# Patient Record
Sex: MALE | Race: BLACK OR AFRICAN AMERICAN | NOT HISPANIC OR LATINO | Employment: OTHER | URBAN - METROPOLITAN AREA
[De-identification: names, ages, dates, MRNs, and addresses within clinical notes are randomized per-mention and may not be internally consistent; named-entity substitution may affect disease eponyms.]

---

## 2023-02-24 ENCOUNTER — DOCUMENTATION (OUTPATIENT)
Dept: PRIMARY CARE | Facility: CLINIC | Age: 38
End: 2023-02-24
Payer: MEDICARE

## 2023-02-24 PROBLEM — M65.332 TRIGGER MIDDLE FINGER OF LEFT HAND: Status: ACTIVE | Noted: 2023-02-24

## 2023-02-24 PROBLEM — Z01.818 PRE-TRANSPLANT EVALUATION FOR END STAGE RENAL DISEASE: Status: ACTIVE | Noted: 2023-02-24

## 2023-02-24 PROBLEM — E10.9 TYPE 1 DIABETES MELLITUS (MULTI): Status: ACTIVE | Noted: 2023-02-24

## 2023-02-24 PROBLEM — E66.3 OVERWEIGHT WITH BODY MASS INDEX (BMI) 25.0-29.9: Status: ACTIVE | Noted: 2023-02-24

## 2023-02-24 PROBLEM — R52 PAIN: Status: ACTIVE | Noted: 2023-02-24

## 2023-02-24 PROBLEM — E53.8 FOLIC ACID DEFICIENCY: Status: ACTIVE | Noted: 2023-02-24

## 2023-02-24 PROBLEM — Z99.2 ESRD (END STAGE RENAL DISEASE) ON DIALYSIS (MULTI): Status: ACTIVE | Noted: 2023-02-24

## 2023-02-24 PROBLEM — N18.6 ESRD (END STAGE RENAL DISEASE) ON DIALYSIS (MULTI): Status: ACTIVE | Noted: 2023-02-24

## 2023-02-24 PROBLEM — E78.5 HLD (HYPERLIPIDEMIA): Status: ACTIVE | Noted: 2023-02-24

## 2023-02-24 PROBLEM — Z79.899 DVT PROPHYLAXIS: Status: ACTIVE | Noted: 2023-02-24

## 2023-02-24 PROBLEM — L29.9 ITCH: Status: ACTIVE | Noted: 2023-02-24

## 2023-02-24 PROBLEM — K21.9 GERD (GASTROESOPHAGEAL REFLUX DISEASE): Status: ACTIVE | Noted: 2023-02-24

## 2023-02-24 PROBLEM — E55.9 VITAMIN D DEFICIENCY: Status: ACTIVE | Noted: 2023-02-24

## 2023-02-24 PROBLEM — E03.9 HYPOTHYROIDISM: Status: ACTIVE | Noted: 2023-02-24

## 2023-02-24 PROBLEM — R74.8 ELEVATED ALKALINE PHOSPHATASE LEVEL: Status: ACTIVE | Noted: 2023-02-24

## 2023-02-24 PROBLEM — I10 HYPERTENSION: Status: ACTIVE | Noted: 2023-02-24

## 2023-02-24 PROBLEM — I73.9 PVD (PERIPHERAL VASCULAR DISEASE) (CMS-HCC): Status: ACTIVE | Noted: 2023-02-24

## 2023-02-24 RX ORDER — INSULIN HUMAN 100 [IU]/ML
INJECTION, SUSPENSION SUBCUTANEOUS
COMMUNITY
Start: 2016-10-27 | End: 2023-11-10 | Stop reason: ENTERED-IN-ERROR

## 2023-02-24 RX ORDER — ASCORBIC ACID, THIAMINE MONONITRATE,RIBOFLAVIN, NIACINAMIDE, PYRIDOXINE HYDROCHLORIDE, FOLIC ACID, CYANOCOBALAMIN, BIOTIN, CALCIUM PANTOTHENATE, 100; 1.5; 1.7; 20; 10; 1; 6000; 150000; 5 MG/1; MG/1; MG/1; MG/1; MG/1; MG/1; UG/1; UG/1; MG/1
1 CAPSULE, LIQUID FILLED ORAL DAILY
COMMUNITY
End: 2023-11-10 | Stop reason: ENTERED-IN-ERROR

## 2023-02-24 RX ORDER — PANTOPRAZOLE SODIUM 40 MG/1
1 TABLET, DELAYED RELEASE ORAL DAILY
COMMUNITY
End: 2023-11-10 | Stop reason: ENTERED-IN-ERROR

## 2023-02-24 RX ORDER — FOLIC ACID 1 MG/1
1 TABLET ORAL DAILY
COMMUNITY
End: 2023-11-10 | Stop reason: ENTERED-IN-ERROR

## 2023-02-24 RX ORDER — CARVEDILOL 12.5 MG/1
1 TABLET ORAL 2 TIMES DAILY
COMMUNITY
End: 2023-11-10 | Stop reason: ENTERED-IN-ERROR

## 2023-02-24 RX ORDER — INSULIN GLARGINE 100 [IU]/ML
15 INJECTION, SOLUTION SUBCUTANEOUS DAILY
COMMUNITY
End: 2023-11-10 | Stop reason: ENTERED-IN-ERROR

## 2023-02-24 RX ORDER — ERGOCALCIFEROL 1.25 MG/1
1 CAPSULE ORAL
COMMUNITY
End: 2023-11-10 | Stop reason: ENTERED-IN-ERROR

## 2023-02-24 RX ORDER — LANOLIN ALCOHOL/MO/W.PET/CERES
2 CREAM (GRAM) TOPICAL DAILY
COMMUNITY
End: 2023-11-10 | Stop reason: ENTERED-IN-ERROR

## 2023-02-24 RX ORDER — SUCRALFATE 1 G/1
1 TABLET ORAL
COMMUNITY
End: 2023-11-10 | Stop reason: ENTERED-IN-ERROR

## 2023-02-24 RX ORDER — SEVELAMER HYDROCHLORIDE 800 MG/1
2 TABLET, FILM COATED ORAL 3 TIMES DAILY
COMMUNITY
End: 2023-11-10 | Stop reason: ENTERED-IN-ERROR

## 2023-02-24 RX ORDER — INSULIN LISPRO 100 [IU]/ML
15 INJECTION, SOLUTION INTRAVENOUS; SUBCUTANEOUS 3 TIMES DAILY
COMMUNITY
End: 2023-11-10 | Stop reason: ENTERED-IN-ERROR

## 2023-02-24 RX ORDER — NITROGLYCERIN 0.4 MG/1
TABLET SUBLINGUAL
COMMUNITY
End: 2023-11-10 | Stop reason: ENTERED-IN-ERROR

## 2023-02-24 RX ORDER — MIDODRINE HYDROCHLORIDE 10 MG/1
1 TABLET ORAL 3 TIMES DAILY
COMMUNITY
End: 2023-11-10 | Stop reason: ENTERED-IN-ERROR

## 2023-02-24 RX ORDER — ATORVASTATIN CALCIUM 40 MG/1
1 TABLET, FILM COATED ORAL DAILY
COMMUNITY
End: 2023-11-10 | Stop reason: ENTERED-IN-ERROR

## 2023-02-24 RX ORDER — VALSARTAN 80 MG/1
2 TABLET ORAL DAILY
COMMUNITY
End: 2023-11-10 | Stop reason: ENTERED-IN-ERROR

## 2023-02-24 RX ORDER — LEVOTHYROXINE SODIUM 125 UG/1
1 TABLET ORAL DAILY
COMMUNITY
End: 2023-11-10 | Stop reason: ENTERED-IN-ERROR

## 2023-03-23 ENCOUNTER — PATIENT OUTREACH (OUTPATIENT)
Dept: PRIMARY CARE | Facility: CLINIC | Age: 38
End: 2023-03-23
Payer: MEDICARE

## 2023-03-23 DIAGNOSIS — I10 ESSENTIAL HYPERTENSION, BENIGN: ICD-10-CM

## 2023-03-23 DIAGNOSIS — E10.9 TYPE 1 DIABETES MELLITUS WITHOUT COMPLICATION (MULTI): ICD-10-CM

## 2023-04-20 ENCOUNTER — PATIENT OUTREACH (OUTPATIENT)
Dept: PRIMARY CARE | Facility: CLINIC | Age: 38
End: 2023-04-20
Payer: MEDICARE

## 2023-04-20 DIAGNOSIS — E10.9 TYPE 1 DIABETES MELLITUS WITHOUT COMPLICATION (MULTI): ICD-10-CM

## 2023-04-20 DIAGNOSIS — I10 ESSENTIAL HYPERTENSION, BENIGN: ICD-10-CM

## 2023-04-20 NOTE — PROGRESS NOTES
LOV: 22, /71, A1C 6.9%  NOV: 23    Treatment Goals    For high blood pressure:   Treatment goals include:  [place]/continue prudent diet and regular exercise.   Blood Pressure Treatment goals include:   BP of 120/80, with no higher than 140/85 on consistent basis.   Exercise at least 3-4 days a week for at least 30 minutes  Eat a balanced diet, rich in fruits and vegetables, low in sodium and processed foods.  If you are overweight, work toward a goal BMI of less than 25, with short-term goal of 10 pound weight loss.    For diabetes:   Treatment goals include:  HgbA1C less than 7.0%  Fasting B - 130 mg/dL  Postprandial BG: less than 180 mg/dL  /80 on consistent basis, with no higher than 140/85  LDL cholesterol less than 100, and HDL cholesterol above 40.  Yearly retinal exam  Check feet periodically for sores or decreased sensation  Exercise at least 3-4 days a week for at least 30 minutes  Work toward a goal BMI of less than 25, although in the shorter term, losing even 10 pounds will help.    Think about those things which may be affecting your ability to reach those goals, and develop a plan to overcome them.    Additional resources are available upon request to help you attain goals, including dietitian.    Wendy Feldman RN

## 2023-05-12 ENCOUNTER — APPOINTMENT (OUTPATIENT)
Dept: PRIMARY CARE | Facility: CLINIC | Age: 38
End: 2023-05-12
Payer: MEDICARE

## 2023-05-15 ENCOUNTER — PATIENT OUTREACH (OUTPATIENT)
Dept: PRIMARY CARE | Facility: CLINIC | Age: 38
End: 2023-05-15
Payer: COMMERCIAL

## 2023-05-15 DIAGNOSIS — I10 ESSENTIAL HYPERTENSION, BENIGN: ICD-10-CM

## 2023-05-15 DIAGNOSIS — E10.9 TYPE 1 DIABETES MELLITUS WITHOUT COMPLICATION (MULTI): ICD-10-CM

## 2023-05-15 PROCEDURE — 99490 CHRNC CARE MGMT STAFF 1ST 20: CPT | Performed by: STUDENT IN AN ORGANIZED HEALTH CARE EDUCATION/TRAINING PROGRAM

## 2023-05-15 NOTE — PROGRESS NOTES
"Spoke w/ pt  Pt identified by name and     LOV: 22, /71, A1C 6.9%  NOV: 23     Treatment Goals     For high blood pressure:   Treatment goals include:  [place]/continue prudent diet and regular exercise.   Blood Pressure Treatment goals include:   BP of 120/80, with no higher than 140/85 on consistent basis.   Exercise at least 3-4 days a week for at least 30 minutes  Eat a balanced diet, rich in fruits and vegetables, low in sodium and processed foods.  If you are overweight, work toward a goal BMI of less than 25, with short-term goal of 10 pound weight loss.     For diabetes:   Treatment goals include:  HgbA1C less than 7.0%  Fasting B - 130 mg/dL  Postprandial BG: less than 180 mg/dL  /80 on consistent basis, with no higher than 140/85  LDL cholesterol less than 100, and HDL cholesterol above 40.  Yearly retinal exam  Check feet periodically for sores or decreased sensation  Exercise at least 3-4 days a week for at least 30 minutes  Work toward a goal BMI of less than 25, although in the shorter term, losing even 10 pounds will help.     Think about those things which may be affecting your ability to reach those goals, and develop a plan to overcome them.     Additional resources are available upon request to help you attain goals, including dietitian.    Pt states \"he is doing well, he does check his bs daily and today his FBS was 123, he is taking all of his medications as instructed and no RF are needed at this time.\"    Thanked pt for his time to speak w/ me today and informed him that I am happy to assist if he has any questions or concerns regarding his healthcare needs.    Pt verbalizes understanding and agrees w/ plan of care.     Wendy Feldman RN     "

## 2023-06-12 ENCOUNTER — PATIENT OUTREACH (OUTPATIENT)
Dept: PRIMARY CARE | Facility: CLINIC | Age: 38
End: 2023-06-12
Payer: COMMERCIAL

## 2023-06-12 DIAGNOSIS — I10 ESSENTIAL HYPERTENSION, BENIGN: ICD-10-CM

## 2023-06-12 DIAGNOSIS — E10.9 TYPE 1 DIABETES MELLITUS WITHOUT COMPLICATION (MULTI): ICD-10-CM

## 2023-06-26 ENCOUNTER — DOCUMENTATION (OUTPATIENT)
Dept: PRIMARY CARE | Facility: CLINIC | Age: 38
End: 2023-06-26
Payer: MEDICARE

## 2023-06-26 DIAGNOSIS — E10.9 TYPE 1 DIABETES MELLITUS WITHOUT COMPLICATION (MULTI): ICD-10-CM

## 2023-06-26 DIAGNOSIS — I10 ESSENTIAL HYPERTENSION, BENIGN: ICD-10-CM

## 2023-06-26 PROCEDURE — 99490 CHRNC CARE MGMT STAFF 1ST 20: CPT | Performed by: STUDENT IN AN ORGANIZED HEALTH CARE EDUCATION/TRAINING PROGRAM

## 2023-06-26 NOTE — PROGRESS NOTES
Chart review completed. This RN is covering for HILLARY Nguyen today. Patient message received that he was discharged from [facility] on 6/25/2023. Chart review completed in [facility], [facility] and  Inpatient to find where he was discharged to. Records show he was discharged to [facility] (an intermediate care facility). No TCM outreach will be performed. I will notify his usual Care Manager upon her return.

## 2023-07-12 ENCOUNTER — PATIENT OUTREACH (OUTPATIENT)
Dept: PRIMARY CARE | Facility: CLINIC | Age: 38
End: 2023-07-12
Payer: MEDICARE

## 2023-07-12 DIAGNOSIS — I10 ESSENTIAL HYPERTENSION, BENIGN: ICD-10-CM

## 2023-07-12 DIAGNOSIS — E10.9 TYPE 1 DIABETES MELLITUS WITHOUT COMPLICATION (MULTI): ICD-10-CM

## 2023-07-12 NOTE — PROGRESS NOTES
LOV: 22, /71, A1C 6.9%    Hospital Admission on: 23 Dx: Cerebral Ischemia     Treatment Goals     For high blood pressure:   Treatment goals include:  [place]/continue prudent diet and regular exercise.   Blood Pressure Treatment goals include:   BP of 120/80, with no higher than 140/85 on consistent basis.   Exercise at least 3-4 days a week for at least 30 minutes  Eat a balanced diet, rich in fruits and vegetables, low in sodium and processed foods.  If you are overweight, work toward a goal BMI of less than 25, with short-term goal of 10 pound weight loss.     For diabetes:   Treatment goals include:  HgbA1C less than 7.0%  Fasting B - 130 mg/dL  Postprandial BG: less than 180 mg/dL  /80 on consistent basis, with no higher than 140/85  LDL cholesterol less than 100, and HDL cholesterol above 40.  Yearly retinal exam  Check feet periodically for sores or decreased sensation  Exercise at least 3-4 days a week for at least 30 minutes  Work toward a goal BMI of less than 25, although in the shorter term, losing even 10 pounds will help.     Think about those things which may be affecting your ability to reach those goals, and develop a plan to overcome them.     Additional resources are available upon request to help you attain goals, including dietitian.     Wendy Feldman RN

## 2023-08-09 LAB
ERYTHROCYTE DISTRIBUTION WIDTH (RATIO) BY AUTOMATED COUNT: 18.1 % (ref 11.5–14.5)
ERYTHROCYTE MEAN CORPUSCULAR HEMOGLOBIN CONCENTRATION (G/DL) BY AUTOMATED: 32.4 G/DL (ref 32–36)
ERYTHROCYTE MEAN CORPUSCULAR VOLUME (FL) BY AUTOMATED COUNT: 93 FL (ref 80–100)
ERYTHROCYTES (10*6/UL) IN BLOOD BY AUTOMATED COUNT: 2.02 X10E12/L (ref 4.5–5.9)
HEMATOCRIT (%) IN BLOOD BY AUTOMATED COUNT: 18.8 % (ref 41–52)
HEMOGLOBIN (G/DL) IN BLOOD: 6.1 G/DL (ref 13.5–17.5)
LEUKOCYTES (10*3/UL) IN BLOOD BY AUTOMATED COUNT: 11.4 X10E9/L (ref 4.4–11.3)
NRBC (PER 100 WBCS) BY AUTOMATED COUNT: 0 /100 WBC (ref 0–0)
PLATELETS (10*3/UL) IN BLOOD AUTOMATED COUNT: 72 X10E9/L (ref 150–450)

## 2023-08-10 ENCOUNTER — PATIENT OUTREACH (OUTPATIENT)
Dept: PRIMARY CARE | Facility: CLINIC | Age: 38
End: 2023-08-10
Payer: MEDICARE

## 2023-08-10 DIAGNOSIS — E10.9 TYPE 1 DIABETES MELLITUS WITHOUT COMPLICATION (MULTI): ICD-10-CM

## 2023-08-10 DIAGNOSIS — I10 ESSENTIAL HYPERTENSION, BENIGN: ICD-10-CM

## 2023-08-10 NOTE — PROGRESS NOTES
Chart Review:    LOV: 22, /71, A1C 6.9%     Hospital Admission on: 23 Dx: Cerebral Ischemia, day #37 inpatient at [facility], POD #1 above the left knee amputation.    Will continue to follow up for CCM/TCM outreach.     Treatment Goals     For high blood pressure:   Treatment goals include:  [place]/continue prudent diet and regular exercise.   Blood Pressure Treatment goals include:   BP of 120/80, with no higher than 140/85 on consistent basis.   Exercise at least 3-4 days a week for at least 30 minutes  Eat a balanced diet, rich in fruits and vegetables, low in sodium and processed foods.  If you are overweight, work toward a goal BMI of less than 25, with short-term goal of 10 pound weight loss.     For diabetes:   Treatment goals include:  HgbA1C less than 7.0%  Fasting B - 130 mg/dL  Postprandial BG: less than 180 mg/dL  /80 on consistent basis, with no higher than 140/85  LDL cholesterol less than 100, and HDL cholesterol above 40.  Yearly retinal exam  Check feet periodically for sores or decreased sensation  Exercise at least 3-4 days a week for at least 30 minutes  Work toward a goal BMI of less than 25, although in the shorter term, losing even 10 pounds will help.     Think about those things which may be affecting your ability to reach those goals, and develop a plan to overcome them.     Additional resources are available upon request to help you attain goals, including dietitian.     Wendy Feldman RN

## 2023-08-15 ENCOUNTER — TELEPHONE (OUTPATIENT)
Dept: PRIMARY CARE | Facility: HOSPITAL | Age: 38
End: 2023-08-15

## 2023-08-18 ENCOUNTER — DOCUMENTATION (OUTPATIENT)
Dept: CARE COORDINATION | Facility: CLINIC | Age: 38
End: 2023-08-18
Payer: MEDICARE

## 2023-08-18 NOTE — PROGRESS NOTES
Referral received for care management services. Referral states to contact patient's mother Shanthi at [phone].  Will outreach to patient's mother to assess needs and provide support.

## 2023-08-22 ENCOUNTER — PATIENT OUTREACH (OUTPATIENT)
Dept: CARE COORDINATION | Facility: CLINIC | Age: 38
End: 2023-08-22
Payer: MEDICARE

## 2023-08-22 NOTE — PROGRESS NOTES
Outreach call to patient's mother Shanthi.  Left voicemail message for patient with my contact information.

## 2023-08-23 ENCOUNTER — PATIENT OUTREACH (OUTPATIENT)
Dept: CARE COORDINATION | Facility: CLINIC | Age: 38
End: 2023-08-23
Payer: MEDICARE

## 2023-08-23 NOTE — PROGRESS NOTES
PRISCILA returned voicemail received from patient's mother Shanthi. Shanthi states that patient needs assistance obtaining an electric wheelchair. She was unsure if his physician had submitted documentation for prior authorization, and suggested I call patient directly.    I was able to reach the patient who said his physician had submitted the prior authorization documentation to [facility] at least one month ago. He expressed concern that he is getting discharged from the rehab facility in the next few days, but still does not have an electric wheelchair. Due to his medical issues, he needs an electric wheelchair. He also requires that the panel to maneuver and control the wheelchair be on the left side.    I left messages for [employer] as well as [employer] to see if either organization could assist with helping patient obtain an electric wheelchair.     Patient and I will complete minimum of weekly check-ins until this issue is resolved.

## 2023-08-29 ENCOUNTER — PATIENT OUTREACH (OUTPATIENT)
Dept: CARE COORDINATION | Facility: CLINIC | Age: 38
End: 2023-08-29
Payer: MEDICARE

## 2023-08-29 NOTE — PROGRESS NOTES
Received voicemail from Anat at [facility] stating they have electric wheelchairs available for donation. She needs to know the width that the patient needs, and also if he is able to  the wheelchair from their location.     SW left a message for patient relaying these questions and requesting callback.    Patient did call back shortly after. We called [facility] together via conference call and spoke with Anat. She is sending a form over to my email to be completed by either myself or patient's provider. Once the form is complete, they can schedule pickup. Patient thinks he can get someone from his SNF to  the wheelchair

## 2023-09-05 ENCOUNTER — DOCUMENTATION (OUTPATIENT)
Dept: PRIMARY CARE | Facility: CLINIC | Age: 38
End: 2023-09-05
Payer: COMMERCIAL

## 2023-09-05 ENCOUNTER — PATIENT OUTREACH (OUTPATIENT)
Dept: CARE COORDINATION | Facility: CLINIC | Age: 38
End: 2023-09-05

## 2023-09-05 DIAGNOSIS — I10 ESSENTIAL HYPERTENSION, BENIGN: ICD-10-CM

## 2023-09-05 DIAGNOSIS — E10.9 TYPE 1 DIABETES MELLITUS WITHOUT COMPLICATION (MULTI): ICD-10-CM

## 2023-09-05 PROCEDURE — 99439 CHRNC CARE MGMT STAF EA ADDL: CPT | Performed by: STUDENT IN AN ORGANIZED HEALTH CARE EDUCATION/TRAINING PROGRAM

## 2023-09-05 PROCEDURE — 99490 CHRNC CARE MGMT STAFF 1ST 20: CPT | Performed by: STUDENT IN AN ORGANIZED HEALTH CARE EDUCATION/TRAINING PROGRAM

## 2023-09-05 NOTE — PROGRESS NOTES
Spoke w/ Mr. Hemphill  Pt identified by name and     Informed Mr. Hemphill that I received his [employer] Form for his electric wheelchair from [facility] and let him know that I am completing the form for him today and will fax to [employer].    Informed Mr. Hemphill that this form requires his current weight and I need his current weight to complete this form.    Mr. Hemphill verbalizes understanding and informed me that his current weight is 180lb today.     Thanked Mr. Hemphill for his time to speak w/ me today and informed him that I am happy to assist.    Form placed w/ Dr. D'Amico for his authorization and signature.    Spoke w/ Marito Perea from [employer]    She informed me that I should fax completed form to: Marko Marley at fax number [phone].    Verbalized understanding to Marito Perea and faxed completed form as instructed.    Landy notified.    Wendy Feldman RN

## 2023-09-08 ENCOUNTER — PATIENT OUTREACH (OUTPATIENT)
Dept: CARE COORDINATION | Facility: CLINIC | Age: 38
End: 2023-09-08
Payer: MEDICARE

## 2023-09-08 NOTE — PROGRESS NOTES
PRISCILA received email from [facility] staff Toni Marley:    David Bill,  After talking with Mr. Hemphill I'm not sure we have a chair that will be a good fit for him.  He said that he needs a left handed controlled, fully functional power chair like this one--the only one we have doesn't have batteries that work (so he would have to buy them for ~$400).  I see that he has [facility]--will they not provide a chair for him?    PRISCILA followed up with patient this afternoon to discuss the update from [facility]. Patient is understanding. He does not expect to be discharged in the upcoming days and agrees that we should reach out to [facility] regarding status of prior authorization.

## 2023-09-21 ENCOUNTER — PATIENT OUTREACH (OUTPATIENT)
Dept: CARE COORDINATION | Facility: CLINIC | Age: 38
End: 2023-09-21
Payer: MEDICARE

## 2023-09-21 NOTE — PROGRESS NOTES
Follow-up call with patient this afternoon. Patient reports he is back at [facility]. He said he was discharged there today. Patient expresses feeling comfortable returning back to [facility]. SW advised patient that if he has any concerns about his care to please reach out for help, whether it be to a trusted family member, me, his PCP. Pt expressed understanding.    Will follow-up in 2 weeks.

## 2023-09-21 NOTE — PROGRESS NOTES
Chart Review:  [facility] on 9/14/23  Dx: Altered mental status, unspecified, Hyperkalemia  DC to [facility] on 9/21/23  Wendy Feldman RN

## 2023-10-04 PROCEDURE — 80069 RENAL FUNCTION PANEL: CPT

## 2023-10-04 PROCEDURE — 85025 COMPLETE CBC W/AUTO DIFF WBC: CPT

## 2023-10-05 ENCOUNTER — PATIENT OUTREACH (OUTPATIENT)
Dept: PRIMARY CARE | Facility: CLINIC | Age: 38
End: 2023-10-05
Payer: MEDICARE

## 2023-10-05 ENCOUNTER — LAB REQUISITION (OUTPATIENT)
Dept: LAB | Facility: HOSPITAL | Age: 38
End: 2023-10-05
Payer: MEDICARE

## 2023-10-05 DIAGNOSIS — E10.9 TYPE 1 DIABETES MELLITUS WITHOUT COMPLICATION (MULTI): ICD-10-CM

## 2023-10-05 DIAGNOSIS — N18.9 CHRONIC KIDNEY DISEASE, UNSPECIFIED: ICD-10-CM

## 2023-10-05 DIAGNOSIS — I10 ESSENTIAL HYPERTENSION, BENIGN: ICD-10-CM

## 2023-10-05 LAB
ALBUMIN SERPL BCP-MCNC: 2.2 G/DL (ref 3.4–5)
ANION GAP SERPL CALC-SCNC: 13 MMOL/L (ref 10–20)
BASOPHILS # BLD AUTO: 0.08 X10*3/UL (ref 0–0.1)
BASOPHILS NFR BLD AUTO: 0.8 %
BUN SERPL-MCNC: 30 MG/DL (ref 6–23)
BURR CELLS BLD QL SMEAR: NORMAL
CALCIUM SERPL-MCNC: 8.4 MG/DL (ref 8.6–10.3)
CHLORIDE SERPL-SCNC: 99 MMOL/L (ref 98–107)
CO2 SERPL-SCNC: 28 MMOL/L (ref 21–32)
CREAT SERPL-MCNC: 4.5 MG/DL (ref 0.5–1.3)
EOSINOPHIL # BLD AUTO: 0.29 X10*3/UL (ref 0–0.7)
EOSINOPHIL NFR BLD AUTO: 3 %
ERYTHROCYTE [DISTWIDTH] IN BLOOD BY AUTOMATED COUNT: 21.6 % (ref 11.5–14.5)
GFR SERPL CREATININE-BSD FRML MDRD: 16 ML/MIN/1.73M*2
GLUCOSE SERPL-MCNC: 158 MG/DL (ref 74–99)
HCT VFR BLD AUTO: 29.5 % (ref 41–52)
HGB BLD-MCNC: 8.7 G/DL (ref 13.5–17.5)
IMM GRANULOCYTES # BLD AUTO: 0.07 X10*3/UL (ref 0–0.7)
IMM GRANULOCYTES NFR BLD AUTO: 0.7 % (ref 0–0.9)
LYMPHOCYTES # BLD AUTO: 1.23 X10*3/UL (ref 1.2–4.8)
LYMPHOCYTES NFR BLD AUTO: 12.8 %
MCH RBC QN AUTO: 28.1 PG (ref 26–34)
MCHC RBC AUTO-ENTMCNC: 29.5 G/DL (ref 32–36)
MCV RBC AUTO: 95 FL (ref 80–100)
MONOCYTES # BLD AUTO: 0.83 X10*3/UL (ref 0.1–1)
MONOCYTES NFR BLD AUTO: 8.7 %
NEUTROPHILS # BLD AUTO: 7.09 X10*3/UL (ref 1.2–7.7)
NEUTROPHILS NFR BLD AUTO: 74 %
NRBC BLD-RTO: 0 /100 WBCS (ref 0–0)
PHOSPHATE SERPL-MCNC: 5.2 MG/DL (ref 2.5–4.9)
PLATELET # BLD AUTO: 318 X10*3/UL (ref 150–450)
PMV BLD AUTO: 11.5 FL (ref 7.5–11.5)
POLYCHROMASIA BLD QL SMEAR: NORMAL
POTASSIUM SERPL-SCNC: 3.8 MMOL/L (ref 3.5–5.3)
RBC # BLD AUTO: 3.1 X10*6/UL (ref 4.5–5.9)
RBC MORPH BLD: NORMAL
SCHISTOCYTES BLD QL SMEAR: NORMAL
SODIUM SERPL-SCNC: 136 MMOL/L (ref 136–145)
WBC # BLD AUTO: 9.6 X10*3/UL (ref 4.4–11.3)

## 2023-10-05 NOTE — PROGRESS NOTES
"Spoke w/ Mr. Hemphill  Pt identified by name and     Mr. Hemphill informed me that \"he is doing well and he is currently still at [facility], he will be sure to call when he is dc for follow up visit w/ Dr. D'Amico.    Hosp Adm [facility] on 23  Dx: Altered mental status, unspecified, Hyperkalemia  DC to [facility] on 23- still at [facility] per Mr. Hemphill.    Thanked Mr. Hemphill for his time to speak w/ me today and informed him that I am happy to assist if he has any questions or concerns regarding his healthcare needs.    Mr. Hemphill verbalizes understanding.    Wendy Feldman RN     "

## 2023-10-06 ENCOUNTER — PATIENT OUTREACH (OUTPATIENT)
Dept: CARE COORDINATION | Facility: CLINIC | Age: 38
End: 2023-10-06
Payer: MEDICARE

## 2023-10-18 ENCOUNTER — PATIENT OUTREACH (OUTPATIENT)
Dept: CARE COORDINATION | Facility: CLINIC | Age: 38
End: 2023-10-18
Payer: MEDICARE

## 2023-10-18 ENCOUNTER — LAB REQUISITION (OUTPATIENT)
Dept: LAB | Facility: HOSPITAL | Age: 38
End: 2023-10-18
Payer: MEDICARE

## 2023-10-18 DIAGNOSIS — R53.1 WEAKNESS: ICD-10-CM

## 2023-10-18 LAB
ANION GAP SERPL CALC-SCNC: 15 MMOL/L (ref 10–20)
BUN SERPL-MCNC: 19 MG/DL (ref 6–23)
CALCIUM SERPL-MCNC: 8.7 MG/DL (ref 8.6–10.3)
CHLORIDE SERPL-SCNC: 98 MMOL/L (ref 98–107)
CO2 SERPL-SCNC: 28 MMOL/L (ref 21–32)
CREAT SERPL-MCNC: 2.79 MG/DL (ref 0.5–1.3)
ERYTHROCYTE [DISTWIDTH] IN BLOOD BY AUTOMATED COUNT: 20.5 % (ref 11.5–14.5)
GFR SERPL CREATININE-BSD FRML MDRD: 29 ML/MIN/1.73M*2
GLUCOSE SERPL-MCNC: 84 MG/DL (ref 74–99)
HCT VFR BLD AUTO: 32.2 % (ref 41–52)
HGB BLD-MCNC: 9.6 G/DL (ref 13.5–17.5)
MCH RBC QN AUTO: 28.5 PG (ref 26–34)
MCHC RBC AUTO-ENTMCNC: 29.8 G/DL (ref 32–36)
MCV RBC AUTO: 96 FL (ref 80–100)
NRBC BLD-RTO: 0 /100 WBCS (ref 0–0)
PLATELET # BLD AUTO: 303 X10*3/UL (ref 150–450)
PMV BLD AUTO: 10.2 FL (ref 7.5–11.5)
POTASSIUM SERPL-SCNC: 4.5 MMOL/L (ref 3.5–5.3)
RBC # BLD AUTO: 3.37 X10*6/UL (ref 4.5–5.9)
SODIUM SERPL-SCNC: 136 MMOL/L (ref 136–145)
WBC # BLD AUTO: 7.3 X10*3/UL (ref 4.4–11.3)

## 2023-10-18 PROCEDURE — 80048 BASIC METABOLIC PNL TOTAL CA: CPT

## 2023-10-18 PROCEDURE — 85027 COMPLETE CBC AUTOMATED: CPT

## 2023-10-18 NOTE — PROGRESS NOTES
Completed follow-up call with patient this afternoon. Patient did not have any updates, questions or concerns to discuss today. He is currently at [facility] and does not expect to be leaving in the near future. Patient says the only thing he still would like to resolve is securing an electric wheelchair. SW will look into the prior auth issue further to see if a chair can be obtained.    YEE Veloz   III  [facility]/Accountable Care Organization  Office Phone: [phone]  [email or web address]

## 2023-11-02 ENCOUNTER — PATIENT OUTREACH (OUTPATIENT)
Dept: CARE COORDINATION | Facility: CLINIC | Age: 38
End: 2023-11-02
Payer: MEDICARE

## 2023-11-02 NOTE — PROGRESS NOTES
Made outreach call to patient this afternoon. He says he is doing fine, but said he was unable to talk and asked if he could call me back.    YEE Veloz   III  [facility]/Accountable Care Organization  Office Phone: [phone]  [email or web address]

## 2023-11-06 ENCOUNTER — PATIENT OUTREACH (OUTPATIENT)
Dept: CARE COORDINATION | Facility: CLINIC | Age: 38
End: 2023-11-06
Payer: MEDICARE

## 2023-11-06 NOTE — PROGRESS NOTES
Outreach call to patient this afternoon. Patient states he is doing well. He denies having any concerns or needs at this time. He says everything is going well at [facility]. I asked patient if he would like for me to continue calling him periodically, or if he would prefer to call me if any needs/concerns arise. Patient says he prefers to just call me if needed.    YEE Veloz   III  [facility] Health/Accountable Care Organization  Office Phone: [phone]  [email or web address]

## 2023-11-09 ENCOUNTER — PATIENT OUTREACH (OUTPATIENT)
Dept: PRIMARY CARE | Facility: CLINIC | Age: 38
End: 2023-11-09
Payer: MEDICARE

## 2023-11-09 DIAGNOSIS — E10.9 TYPE 1 DIABETES MELLITUS WITHOUT COMPLICATION (MULTI): ICD-10-CM

## 2023-11-09 DIAGNOSIS — I10 ESSENTIAL HYPERTENSION, BENIGN: ICD-10-CM

## 2023-11-09 NOTE — PROGRESS NOTES
Hosp Adm [facility] on 9/14/23  Dx: Altered mental status, unspecified, Hyperkalemia  DC to [facility] on 9/21/23- still at [facility] per Mr. Hemphill.  Wendy Feldman RN

## 2023-11-10 ENCOUNTER — APPOINTMENT (OUTPATIENT)
Dept: RADIOLOGY | Facility: HOSPITAL | Age: 38
DRG: 314 | End: 2023-11-10
Payer: MEDICARE

## 2023-11-10 ENCOUNTER — HOSPITAL ENCOUNTER (INPATIENT)
Facility: HOSPITAL | Age: 38
LOS: 7 days | Discharge: NURSING, REHAB OR LONG-TERM CARE | DRG: 314 | End: 2023-11-17
Attending: EMERGENCY MEDICINE | Admitting: SPECIALIST
Payer: MEDICARE

## 2023-11-10 DIAGNOSIS — N18.6 ESRD (END STAGE RENAL DISEASE) ON DIALYSIS (MULTI): ICD-10-CM

## 2023-11-10 DIAGNOSIS — K21.00 GASTROESOPHAGEAL REFLUX DISEASE WITH ESOPHAGITIS WITHOUT HEMORRHAGE: ICD-10-CM

## 2023-11-10 DIAGNOSIS — R78.81 BACTEREMIA ASSOCIATED WITH INTRAVASCULAR LINE, INITIAL ENCOUNTER (CMS-HCC): ICD-10-CM

## 2023-11-10 DIAGNOSIS — T82.7XXA BACTEREMIA ASSOCIATED WITH INTRAVASCULAR LINE, INITIAL ENCOUNTER (CMS-HCC): ICD-10-CM

## 2023-11-10 DIAGNOSIS — Z99.2 ESRD (END STAGE RENAL DISEASE) ON DIALYSIS (MULTI): ICD-10-CM

## 2023-11-10 DIAGNOSIS — L03.116 CELLULITIS AND ABSCESS OF LEFT LEG: Primary | ICD-10-CM

## 2023-11-10 DIAGNOSIS — L02.416 CELLULITIS AND ABSCESS OF LEFT LEG: Primary | ICD-10-CM

## 2023-11-10 DIAGNOSIS — N18.6 ESRD (END STAGE RENAL DISEASE) (MULTI): ICD-10-CM

## 2023-11-10 DIAGNOSIS — E10.9 TYPE 1 DIABETES MELLITUS WITHOUT COMPLICATION (MULTI): ICD-10-CM

## 2023-11-10 DIAGNOSIS — I73.9 PVD (PERIPHERAL VASCULAR DISEASE) (CMS-HCC): ICD-10-CM

## 2023-11-10 LAB
ALBUMIN SERPL BCP-MCNC: 3.1 G/DL (ref 3.4–5)
ALP SERPL-CCNC: 401 U/L (ref 33–120)
ALT SERPL W P-5'-P-CCNC: 10 U/L (ref 10–52)
ANION GAP SERPL CALC-SCNC: 22 MMOL/L (ref 10–20)
AST SERPL W P-5'-P-CCNC: 10 U/L (ref 9–39)
BASOPHILS # BLD AUTO: 0.05 X10*3/UL (ref 0–0.1)
BASOPHILS NFR BLD AUTO: 0.3 %
BILIRUB SERPL-MCNC: 0.7 MG/DL (ref 0–1.2)
BUN SERPL-MCNC: 47 MG/DL (ref 6–23)
CALCIUM SERPL-MCNC: 8.8 MG/DL (ref 8.6–10.3)
CHLORIDE SERPL-SCNC: 92 MMOL/L (ref 98–107)
CO2 SERPL-SCNC: 24 MMOL/L (ref 21–32)
CREAT SERPL-MCNC: 7.92 MG/DL (ref 0.5–1.3)
EOSINOPHIL # BLD AUTO: 0.27 X10*3/UL (ref 0–0.7)
EOSINOPHIL NFR BLD AUTO: 1.4 %
ERYTHROCYTE [DISTWIDTH] IN BLOOD BY AUTOMATED COUNT: 18.6 % (ref 11.5–14.5)
GFR SERPL CREATININE-BSD FRML MDRD: 8 ML/MIN/1.73M*2
GLUCOSE BLD MANUAL STRIP-MCNC: 131 MG/DL (ref 74–99)
GLUCOSE SERPL-MCNC: 131 MG/DL (ref 74–99)
HCT VFR BLD AUTO: 32.7 % (ref 41–52)
HGB BLD-MCNC: 10.2 G/DL (ref 13.5–17.5)
IMM GRANULOCYTES # BLD AUTO: 0.15 X10*3/UL (ref 0–0.7)
IMM GRANULOCYTES NFR BLD AUTO: 0.8 % (ref 0–0.9)
LACTATE SERPL-SCNC: 1.1 MMOL/L (ref 0.4–2)
LYMPHOCYTES # BLD AUTO: 0.44 X10*3/UL (ref 1.2–4.8)
LYMPHOCYTES NFR BLD AUTO: 2.2 %
MCH RBC QN AUTO: 27.3 PG (ref 26–34)
MCHC RBC AUTO-ENTMCNC: 31.2 G/DL (ref 32–36)
MCV RBC AUTO: 88 FL (ref 80–100)
MONOCYTES # BLD AUTO: 0.77 X10*3/UL (ref 0.1–1)
MONOCYTES NFR BLD AUTO: 3.9 %
NEUTROPHILS # BLD AUTO: 18.02 X10*3/UL (ref 1.2–7.7)
NEUTROPHILS NFR BLD AUTO: 91.4 %
NRBC BLD-RTO: 0 /100 WBCS (ref 0–0)
PLATELET # BLD AUTO: 370 X10*3/UL (ref 150–450)
POTASSIUM SERPL-SCNC: 5.3 MMOL/L (ref 3.5–5.3)
PROT SERPL-MCNC: 7.1 G/DL (ref 6.4–8.2)
RBC # BLD AUTO: 3.73 X10*6/UL (ref 4.5–5.9)
SODIUM SERPL-SCNC: 133 MMOL/L (ref 136–145)
WBC # BLD AUTO: 19.7 X10*3/UL (ref 4.4–11.3)

## 2023-11-10 PROCEDURE — 73700 CT LOWER EXTREMITY W/O DYE: CPT | Mod: LT

## 2023-11-10 PROCEDURE — 82947 ASSAY GLUCOSE BLOOD QUANT: CPT

## 2023-11-10 PROCEDURE — 87077 CULTURE AEROBIC IDENTIFY: CPT | Mod: AHULAB | Performed by: EMERGENCY MEDICINE

## 2023-11-10 PROCEDURE — 85025 COMPLETE CBC W/AUTO DIFF WBC: CPT | Performed by: EMERGENCY MEDICINE

## 2023-11-10 PROCEDURE — 73700 CT LOWER EXTREMITY W/O DYE: CPT | Mod: LEFT SIDE | Performed by: RADIOLOGY

## 2023-11-10 PROCEDURE — 83605 ASSAY OF LACTIC ACID: CPT | Performed by: EMERGENCY MEDICINE

## 2023-11-10 PROCEDURE — 96365 THER/PROPH/DIAG IV INF INIT: CPT

## 2023-11-10 PROCEDURE — 99285 EMERGENCY DEPT VISIT HI MDM: CPT | Mod: 25 | Performed by: EMERGENCY MEDICINE

## 2023-11-10 PROCEDURE — 71045 X-RAY EXAM CHEST 1 VIEW: CPT | Mod: FOREIGN READ | Performed by: RADIOLOGY

## 2023-11-10 PROCEDURE — 96366 THER/PROPH/DIAG IV INF ADDON: CPT

## 2023-11-10 PROCEDURE — 1210000001 HC SEMI-PRIVATE ROOM DAILY

## 2023-11-10 PROCEDURE — 36415 COLL VENOUS BLD VENIPUNCTURE: CPT | Performed by: EMERGENCY MEDICINE

## 2023-11-10 PROCEDURE — 96375 TX/PRO/DX INJ NEW DRUG ADDON: CPT

## 2023-11-10 PROCEDURE — 96367 TX/PROPH/DG ADDL SEQ IV INF: CPT

## 2023-11-10 PROCEDURE — 80053 COMPREHEN METABOLIC PANEL: CPT | Performed by: EMERGENCY MEDICINE

## 2023-11-10 PROCEDURE — 2500000004 HC RX 250 GENERAL PHARMACY W/ HCPCS (ALT 636 FOR OP/ED): Mod: JZ | Performed by: EMERGENCY MEDICINE

## 2023-11-10 PROCEDURE — A4217 STERILE WATER/SALINE, 500 ML: HCPCS | Performed by: EMERGENCY MEDICINE

## 2023-11-10 PROCEDURE — 2500000004 HC RX 250 GENERAL PHARMACY W/ HCPCS (ALT 636 FOR OP/ED): Performed by: PHARMACIST

## 2023-11-10 PROCEDURE — 71045 X-RAY EXAM CHEST 1 VIEW: CPT | Mod: FY,FR

## 2023-11-10 RX ORDER — ACETAMINOPHEN 325 MG/1
975 TABLET ORAL ONCE
Status: COMPLETED | OUTPATIENT
Start: 2023-11-10 | End: 2023-11-10

## 2023-11-10 RX ORDER — SIMETHICONE 80 MG
80 TABLET,CHEWABLE ORAL EVERY 8 HOURS PRN
COMMUNITY

## 2023-11-10 RX ORDER — ACETAMINOPHEN 325 MG/1
650 TABLET ORAL EVERY 4 HOURS PRN
Status: DISCONTINUED | OUTPATIENT
Start: 2023-11-10 | End: 2023-11-14

## 2023-11-10 RX ORDER — ONDANSETRON HYDROCHLORIDE 2 MG/ML
4 INJECTION, SOLUTION INTRAVENOUS EVERY 8 HOURS PRN
Status: DISCONTINUED | OUTPATIENT
Start: 2023-11-10 | End: 2023-11-17 | Stop reason: HOSPADM

## 2023-11-10 RX ORDER — ONDANSETRON 4 MG/1
4 TABLET, FILM COATED ORAL EVERY 8 HOURS PRN
Status: DISCONTINUED | OUTPATIENT
Start: 2023-11-10 | End: 2023-11-17 | Stop reason: HOSPADM

## 2023-11-10 RX ORDER — TIGECYCLINE 50 MG/10ML
50 INJECTION, POWDER, LYOPHILIZED, FOR SOLUTION INTRAVENOUS EVERY 12 HOURS SCHEDULED
Status: DISCONTINUED | OUTPATIENT
Start: 2023-11-11 | End: 2023-11-12

## 2023-11-10 RX ORDER — VANCOMYCIN HYDROCHLORIDE 1 G/20ML
INJECTION, POWDER, LYOPHILIZED, FOR SOLUTION INTRAVENOUS DAILY PRN
Status: DISCONTINUED | OUTPATIENT
Start: 2023-11-10 | End: 2023-11-17 | Stop reason: HOSPADM

## 2023-11-10 RX ORDER — TALC
3 POWDER (GRAM) TOPICAL NIGHTLY PRN
Status: DISCONTINUED | OUTPATIENT
Start: 2023-11-10 | End: 2023-11-17 | Stop reason: HOSPADM

## 2023-11-10 RX ORDER — PANTOPRAZOLE SODIUM 40 MG/1
40 TABLET, DELAYED RELEASE ORAL
Status: DISCONTINUED | OUTPATIENT
Start: 2023-11-11 | End: 2023-11-17 | Stop reason: HOSPADM

## 2023-11-10 RX ORDER — ACETAMINOPHEN 160 MG/5ML
650 SOLUTION ORAL EVERY 4 HOURS PRN
Status: DISCONTINUED | OUTPATIENT
Start: 2023-11-10 | End: 2023-11-13

## 2023-11-10 RX ORDER — SIMETHICONE 80 MG
80 TABLET,CHEWABLE ORAL EVERY 8 HOURS PRN
Status: DISCONTINUED | OUTPATIENT
Start: 2023-11-10 | End: 2023-11-17 | Stop reason: HOSPADM

## 2023-11-10 RX ORDER — ACETAMINOPHEN 650 MG/1
650 SUPPOSITORY RECTAL EVERY 4 HOURS PRN
Status: DISCONTINUED | OUTPATIENT
Start: 2023-11-10 | End: 2023-11-13

## 2023-11-10 RX ADMIN — DEXTROSE MONOHYDRATE 100 MG: 50 INJECTION, SOLUTION INTRAVENOUS at 14:03

## 2023-11-10 RX ADMIN — PIPERACILLIN SODIUM AND TAZOBACTAM SODIUM 2.25 G: 2; .25 INJECTION, SOLUTION INTRAVENOUS at 12:22

## 2023-11-10 RX ADMIN — ACETAMINOPHEN 975 MG: 325 TABLET ORAL at 15:40

## 2023-11-10 RX ADMIN — WATER 1750 MG: 1 INJECTION INTRAMUSCULAR; INTRAVENOUS; SUBCUTANEOUS at 14:02

## 2023-11-10 RX ADMIN — PIPERACILLIN SODIUM AND TAZOBACTAM SODIUM 2.25 G: 2; .25 INJECTION, SOLUTION INTRAVENOUS at 21:30

## 2023-11-10 RX ADMIN — SODIUM CHLORIDE, POTASSIUM CHLORIDE, SODIUM LACTATE AND CALCIUM CHLORIDE 500 ML: 600; 310; 30; 20 INJECTION, SOLUTION INTRAVENOUS at 16:34

## 2023-11-10 NOTE — PROGRESS NOTES
"Vancomycin Dosing by Pharmacy- INITIAL    Edwar Hemphill is a 38 y.o. year old male who Pharmacy has been consulted for vancomycin dosing for cellulitis, skin and soft tissue. Based on the patient's indication and renal status this patient will be dosed based on a goal pre-HD level of 15-25.     Renal function is currently stable. On HD    Visit Vitals  /82   Pulse (!) 135   Temp 38.7 °C (101.7 °F)   Resp 25        Lab Results   Component Value Date    CREATININE 7.92 (H) 11/10/2023    CREATININE 2.79 (H) 10/18/2023    CREATININE 4.50 (H) 10/04/2023    CREATININE 4.13 (H) 09/21/2023    CREATININE 4.46 (H) 09/19/2023    CREATININE 5.78 (H) 09/18/2023    CREATININE CANCELED 09/18/2023        Patient weight is 79.4 kg    No results found for: \"CULTURE\"     No intake/output data recorded.  [unfilled]    Lab Results   Component Value Date    PATIENTTEMP 37.0 07/09/2023    PATIENTTEMP 37.0 07/08/2023    PATIENTTEMP 37.0 07/08/2023          Assessment/Plan     Patient has already been given a loading dose of 1750  mg. In the ED  Will initiate vancomycin maintenance, with each dialysis session     Follow-up level will be ordered before next HD session unless clinically indicated sooner.  Will continue to monitor renal function daily while on vancomycin and order serum creatinine at least every 48 hours if not already ordered.  Follow for continued vancomycin needs, clinical response, and signs/symptoms of toxicity.       Deep Rich, PharmD       "

## 2023-11-10 NOTE — PROGRESS NOTES
Pharmacy Medication History Review    Edwar Hemphill is a 38 y.o. male admitted for Cellulitis and abscess of left leg. Pharmacy reviewed the patient's [id number] medications and allergies for accuracy.    The list below reflectives the updated PTA list. Please review each medication in order reconciliation for additional clarification and justification.  Prior to Admission Medications   Prescriptions Last Dose Informant Patient Reported? Taking?   simethicone (Mylicon) 80 mg chewable tablet   Yes No   Sig: Chew 1 tablet (80 mg) every 8 hours if needed for flatulence.      Facility-Administered Medications: None           The list below reflectives the updated allergy list. Please review each documented allergy for additional clarification and justification.  Allergies  Reviewed by Yeni Hayes RN on 11/10/2023   No Known Allergies         Below are additional concerns with the patient's PTA list.  FACILITY: [facility]    Livier Mann CPhT

## 2023-11-10 NOTE — ED PROVIDER NOTES
HPI   Chief Complaint   Patient presents with    Rapid Heart Rate     Patient comes in from home from facility with complaints of rapid heart rate, fever, chills, and lethargy. Patient does have a wound vac on left lower extremity for amputation in August 2023. Patient is on dialysis [Monday, Wednesday, Friday] but missed his last session due to not feeling well        38-year-old man history of insulin-dependent diabetes, end-stage renal disease on hemodialysis with last dialysis session on Monday secondary to not feeling well, left AKA with recent stump infection and current wound VAC, anemia, hypothyroidism, peripheral arterial disease here with fever.  Patient reports he has not been feeling well over the past 2 days.  He denies having any specific pain but reports he is sleepy.  He is currently residing at [facility].                              No data recorded                Patient History   Past Medical History:   Diagnosis Date    Essential (primary) hypertension 05/26/2017    HTN (hypertension), benign    Personal history of other endocrine, nutritional and metabolic disease 05/26/2017    History of diabetes mellitus     Past Surgical History:   Procedure Laterality Date    CT AORTA AND BILATERAL ILIOFEMORAL RUNOFF ANGIOGRAM W AND/OR WO IV CONTRAST  2/9/2023    CT AORTA AND BILATERAL ILIOFEMORAL RUNOFF ANGIOGRAM W AND/OR WO IV CONTRAST 2/9/2023 DOCTOR OFFICE LEGACY     Family History   Problem Relation Name Age of Onset    Other (DIABETES DUE TO UNDERLYING CONDITION WITH MICROALBUMINURIA) Father      Other (DIABETES DUE TO UNDERLYING CONDITION WITH MICROALBUMINURIA [Other]) Other AUNT     Other (DIABETES DUE TO UNDERLYING CONDITION WITH MICROALBUMINURIA [Other]) Other GP      Social History     Tobacco Use    Smoking status: Not on file    Smokeless tobacco: Not on file   Substance Use Topics    Alcohol use: Not on file    Drug use: Not on file       Physical Exam   ED Triage Vitals   Temp Pulse  Resp BP   -- -- -- --      SpO2 Temp src Heart Rate Source Patient Position   -- -- -- --      BP Location FiO2 (%)     -- --       Physical Exam  Vitals and nursing note reviewed.   Constitutional:       General: He is in acute distress.      Appearance: He is ill-appearing.   HENT:      Head: Normocephalic.      Mouth/Throat:      Mouth: Mucous membranes are dry.   Eyes:      Conjunctiva/sclera: Conjunctivae normal.      Pupils: Pupils are equal, round, and reactive to light.   Cardiovascular:      Rate and Rhythm: Regular rhythm. Tachycardia present.      Pulses:           Radial pulses are 2+ on the right side and 2+ on the left side.      Heart sounds: No murmur heard.     No friction rub.   Pulmonary:      Effort: Pulmonary effort is normal. No respiratory distress.      Breath sounds: Normal breath sounds.      Comments: 90% on RA, with 2L NC oxygen saturation is 96%  Abdominal:      General: Abdomen is flat.      Palpations: Abdomen is soft.      Tenderness: There is no abdominal tenderness. There is no guarding or rebound.   Musculoskeletal:      Cervical back: Normal range of motion and neck supple.      Comments: RUE above elbow amputation.  RLE intact with heel ulcer without surrounding erythema.  Left AKA with wound vac.  LUE intact.  Dialysis catheter right groin   Skin:     General: Skin is warm and dry.      Comments: Ttp left AKA medial aspect of stump.  No palpable abscess.  Wound vac intact, draining clear fluid   Neurological:      Mental Status: He is alert and oriented to person, place, and time.   Psychiatric:         Mood and Affect: Mood normal.         ED Course & MDM   Diagnoses as of 11/10/23 1586   Cellulitis and abscess of left leg   ESRD (end stage renal disease) (CMS/[facility])       Medical Decision Making  EKG my interpretation is sinus tachycardia, heart rate 146, no ST elevations or depressions    Patient has positive SIRS without evidence of organ dysfunction.  Most likely source is  his left stump infection.  I have started broad-spectrum antibiotics including covering for Acinetobacter which she has grown in the past.  Patient's primary care physician Dr. Barnett refers to Dr. Osorio so I will admit to Dr. Osorio with Dr. Bruce on consult for nephrology.  Consult to Dr. Jauregui as well from infectious disease.    Patient's doctor at [facility] is actually Dr. Estrada so I changed the admitting physician to Dr. Estrada.        Procedure  Procedures     Eva Rios MD  11/10/23 1527       Eva Rios MD  11/10/23 1554

## 2023-11-11 ENCOUNTER — APPOINTMENT (OUTPATIENT)
Dept: DIALYSIS | Facility: HOSPITAL | Age: 38
End: 2023-11-11
Payer: MEDICARE

## 2023-11-11 LAB
ALBUMIN SERPL BCP-MCNC: 2.8 G/DL (ref 3.4–5)
ANION GAP SERPL CALC-SCNC: 30 MMOL/L (ref 10–20)
BUN SERPL-MCNC: 52 MG/DL (ref 6–23)
CALCIUM SERPL-MCNC: 8.2 MG/DL (ref 8.6–10.3)
CHLORIDE SERPL-SCNC: 91 MMOL/L (ref 98–107)
CO2 SERPL-SCNC: 17 MMOL/L (ref 21–32)
CREAT SERPL-MCNC: 8.24 MG/DL (ref 0.5–1.3)
ERYTHROCYTE [DISTWIDTH] IN BLOOD BY AUTOMATED COUNT: 18.7 % (ref 11.5–14.5)
GFR SERPL CREATININE-BSD FRML MDRD: 8 ML/MIN/1.73M*2
GLUCOSE BLD MANUAL STRIP-MCNC: 142 MG/DL (ref 74–99)
GLUCOSE BLD MANUAL STRIP-MCNC: 181 MG/DL (ref 74–99)
GLUCOSE BLD MANUAL STRIP-MCNC: 249 MG/DL (ref 74–99)
GLUCOSE BLD MANUAL STRIP-MCNC: 256 MG/DL (ref 74–99)
GLUCOSE SERPL-MCNC: 213 MG/DL (ref 74–99)
HCT VFR BLD AUTO: 32.5 % (ref 41–52)
HGB BLD-MCNC: 10.1 G/DL (ref 13.5–17.5)
MCH RBC QN AUTO: 27.8 PG (ref 26–34)
MCHC RBC AUTO-ENTMCNC: 31.1 G/DL (ref 32–36)
MCV RBC AUTO: 90 FL (ref 80–100)
NRBC BLD-RTO: 0 /100 WBCS (ref 0–0)
PHOSPHATE SERPL-MCNC: 5.6 MG/DL (ref 2.5–4.9)
PLATELET # BLD AUTO: 338 X10*3/UL (ref 150–450)
POTASSIUM SERPL-SCNC: 5.7 MMOL/L (ref 3.5–5.3)
RBC # BLD AUTO: 3.63 X10*6/UL (ref 4.5–5.9)
SARS-COV-2 RNA RESP QL NAA+PROBE: NOT DETECTED
SODIUM SERPL-SCNC: 132 MMOL/L (ref 136–145)
WBC # BLD AUTO: 17.3 X10*3/UL (ref 4.4–11.3)

## 2023-11-11 PROCEDURE — 2500000004 HC RX 250 GENERAL PHARMACY W/ HCPCS (ALT 636 FOR OP/ED): Performed by: INTERNAL MEDICINE

## 2023-11-11 PROCEDURE — 2500000002 HC RX 250 W HCPCS SELF ADMINISTERED DRUGS (ALT 637 FOR MEDICARE OP, ALT 636 FOR OP/ED): Performed by: SPECIALIST

## 2023-11-11 PROCEDURE — 8010000001 HC DIALYSIS - HEMODIALYSIS PER DAY

## 2023-11-11 PROCEDURE — 80069 RENAL FUNCTION PANEL: CPT | Performed by: NURSE PRACTITIONER

## 2023-11-11 PROCEDURE — 5A1D70Z PERFORMANCE OF URINARY FILTRATION, INTERMITTENT, LESS THAN 6 HOURS PER DAY: ICD-10-PCS | Performed by: INTERNAL MEDICINE

## 2023-11-11 PROCEDURE — 1200000002 HC GENERAL ROOM WITH TELEMETRY DAILY

## 2023-11-11 PROCEDURE — 85027 COMPLETE CBC AUTOMATED: CPT | Performed by: NURSE PRACTITIONER

## 2023-11-11 PROCEDURE — 82947 ASSAY GLUCOSE BLOOD QUANT: CPT

## 2023-11-11 PROCEDURE — 36415 COLL VENOUS BLD VENIPUNCTURE: CPT | Performed by: NURSE PRACTITIONER

## 2023-11-11 PROCEDURE — 90935 HEMODIALYSIS ONE EVALUATION: CPT | Performed by: INTERNAL MEDICINE

## 2023-11-11 PROCEDURE — 87635 SARS-COV-2 COVID-19 AMP PRB: CPT | Performed by: STUDENT IN AN ORGANIZED HEALTH CARE EDUCATION/TRAINING PROGRAM

## 2023-11-11 PROCEDURE — 36415 COLL VENOUS BLD VENIPUNCTURE: CPT | Performed by: INTERNAL MEDICINE

## 2023-11-11 PROCEDURE — 2500000004 HC RX 250 GENERAL PHARMACY W/ HCPCS (ALT 636 FOR OP/ED): Performed by: PHARMACIST

## 2023-11-11 PROCEDURE — 87040 BLOOD CULTURE FOR BACTERIA: CPT | Mod: AHULAB | Performed by: INTERNAL MEDICINE

## 2023-11-11 RX ORDER — VANCOMYCIN HYDROCHLORIDE 750 MG/150ML
750 INJECTION, SOLUTION INTRAVENOUS ONCE
Status: COMPLETED | OUTPATIENT
Start: 2023-11-11 | End: 2023-11-11

## 2023-11-11 RX ORDER — DEXTROSE MONOHYDRATE 100 MG/ML
0.3 INJECTION, SOLUTION INTRAVENOUS ONCE AS NEEDED
Status: DISCONTINUED | OUTPATIENT
Start: 2023-11-11 | End: 2023-11-17 | Stop reason: HOSPADM

## 2023-11-11 RX ORDER — INSULIN LISPRO 100 [IU]/ML
0-5 INJECTION, SOLUTION INTRAVENOUS; SUBCUTANEOUS
Status: DISCONTINUED | OUTPATIENT
Start: 2023-11-11 | End: 2023-11-12

## 2023-11-11 RX ORDER — DEXTROSE 50 % IN WATER (D50W) INTRAVENOUS SYRINGE
25
Status: DISCONTINUED | OUTPATIENT
Start: 2023-11-11 | End: 2023-11-17 | Stop reason: HOSPADM

## 2023-11-11 RX ADMIN — DEXTROSE MONOHYDRATE 50 MG: 50 INJECTION, SOLUTION INTRAVENOUS at 13:54

## 2023-11-11 RX ADMIN — INSULIN LISPRO 3 UNITS: 100 INJECTION, SOLUTION INTRAVENOUS; SUBCUTANEOUS at 12:48

## 2023-11-11 RX ADMIN — DEXTROSE MONOHYDRATE 50 MG: 50 INJECTION, SOLUTION INTRAVENOUS at 02:10

## 2023-11-11 RX ADMIN — PIPERACILLIN SODIUM AND TAZOBACTAM SODIUM 2.25 G: 2; .25 INJECTION, SOLUTION INTRAVENOUS at 04:49

## 2023-11-11 RX ADMIN — PIPERACILLIN SODIUM AND TAZOBACTAM SODIUM 2.25 G: 2; .25 INJECTION, SOLUTION INTRAVENOUS at 12:48

## 2023-11-11 RX ADMIN — PIPERACILLIN SODIUM AND TAZOBACTAM SODIUM 2.25 G: 2; .25 INJECTION, SOLUTION INTRAVENOUS at 20:14

## 2023-11-11 RX ADMIN — VANCOMYCIN HYDROCHLORIDE 750 MG: 750 INJECTION, SOLUTION INTRAVENOUS at 18:58

## 2023-11-11 SDOH — SOCIAL STABILITY: SOCIAL INSECURITY: DOES ANYONE TRY TO KEEP YOU FROM HAVING/CONTACTING OTHER FRIENDS OR DOING THINGS OUTSIDE YOUR HOME?: UNABLE TO ASSESS

## 2023-11-11 SDOH — SOCIAL STABILITY: SOCIAL INSECURITY: DO YOU FEEL ANYONE HAS EXPLOITED OR TAKEN ADVANTAGE OF YOU FINANCIALLY OR OF YOUR PERSONAL PROPERTY?: UNABLE TO ASSESS

## 2023-11-11 SDOH — SOCIAL STABILITY: SOCIAL INSECURITY: WERE YOU ABLE TO COMPLETE ALL THE BEHAVIORAL HEALTH SCREENINGS?: NO

## 2023-11-11 SDOH — SOCIAL STABILITY: SOCIAL INSECURITY: ARE YOU OR HAVE YOU BEEN THREATENED OR ABUSED PHYSICALLY, EMOTIONALLY, OR SEXUALLY BY ANYONE?: UNABLE TO ASSESS

## 2023-11-11 SDOH — SOCIAL STABILITY: SOCIAL INSECURITY: DO YOU FEEL UNSAFE GOING BACK TO THE PLACE WHERE YOU ARE LIVING?: UNABLE TO ASSESS

## 2023-11-11 SDOH — SOCIAL STABILITY: SOCIAL INSECURITY: HAS ANYONE EVER THREATENED TO HURT YOUR FAMILY OR YOUR PETS?: UNABLE TO ASSESS

## 2023-11-11 SDOH — SOCIAL STABILITY: SOCIAL INSECURITY: ARE THERE ANY APPARENT SIGNS OF INJURIES/BEHAVIORS THAT COULD BE RELATED TO ABUSE/NEGLECT?: UNABLE TO ASSESS

## 2023-11-11 ASSESSMENT — COGNITIVE AND FUNCTIONAL STATUS - GENERAL
TURNING FROM BACK TO SIDE WHILE IN FLAT BAD: A LITTLE
DAILY ACTIVITIY SCORE: 10
PERSONAL GROOMING: TOTAL
PATIENT BASELINE BEDBOUND: YES
HELP NEEDED FOR BATHING: TOTAL
MOVING FROM LYING ON BACK TO SITTING ON SIDE OF FLAT BED WITH BEDRAILS: A LITTLE
CLIMB 3 TO 5 STEPS WITH RAILING: TOTAL
WALKING IN HOSPITAL ROOM: TOTAL
DRESSING REGULAR LOWER BODY CLOTHING: TOTAL
MOVING TO AND FROM BED TO CHAIR: A LOT
STANDING UP FROM CHAIR USING ARMS: TOTAL
TOILETING: TOTAL
DRESSING REGULAR UPPER BODY CLOTHING: A LOT
MOBILITY SCORE: 11

## 2023-11-11 ASSESSMENT — ACTIVITIES OF DAILY LIVING (ADL)
BATHING: DEPENDENT
HEARING - RIGHT EAR: FUNCTIONAL
JUDGMENT_ADEQUATE_SAFELY_COMPLETE_DAILY_ACTIVITIES: NO
DRESSING YOURSELF: DEPENDENT
ADEQUATE_TO_COMPLETE_ADL: YES
DRESSING YOURSELF: DEPENDENT
WALKS IN HOME: DEPENDENT
PATIENT'S MEMORY ADEQUATE TO SAFELY COMPLETE DAILY ACTIVITIES?: NO
HEARING - LEFT EAR: FUNCTIONAL
PATIENT'S MEMORY ADEQUATE TO SAFELY COMPLETE DAILY ACTIVITIES?: NO
DRESSING YOURSELF: DEPENDENT
FEEDING YOURSELF: NEEDS ASSISTANCE
ADEQUATE_TO_COMPLETE_ADL: YES
FEEDING YOURSELF: DEPENDENT
JUDGMENT_ADEQUATE_SAFELY_COMPLETE_DAILY_ACTIVITIES: NO
WALKS IN HOME: DEPENDENT
TOILETING: DEPENDENT
BATHING: DEPENDENT
FEEDING YOURSELF: NEEDS ASSISTANCE
WALKS IN HOME: DEPENDENT
TOILETING: DEPENDENT
PATIENT'S MEMORY ADEQUATE TO SAFELY COMPLETE DAILY ACTIVITIES?: NO
GROOMING: DEPENDENT
BATHING: DEPENDENT
HEARING - RIGHT EAR: FUNCTIONAL
TOILETING: DEPENDENT
HEARING - LEFT EAR: FUNCTIONAL
GROOMING: NEEDS ASSISTANCE
GROOMING: DEPENDENT
HEARING - RIGHT EAR: FUNCTIONAL
ADEQUATE_TO_COMPLETE_ADL: YES
HEARING - LEFT EAR: FUNCTIONAL
JUDGMENT_ADEQUATE_SAFELY_COMPLETE_DAILY_ACTIVITIES: NO

## 2023-11-11 ASSESSMENT — PAIN SCALES - GENERAL
PAINLEVEL_OUTOF10: 0 - NO PAIN
PAINLEVEL_OUTOF10: 0 - NO PAIN

## 2023-11-11 ASSESSMENT — LIFESTYLE VARIABLES
AUDIT-C TOTAL SCORE: 0
HOW OFTEN DO YOU HAVE 6 OR MORE DRINKS ON ONE OCCASION: NEVER
SUBSTANCE_ABUSE_PAST_12_MONTHS: NO
SKIP TO QUESTIONS 9-10: 1
HOW OFTEN DO YOU HAVE A DRINK CONTAINING ALCOHOL: NEVER
HOW MANY STANDARD DRINKS CONTAINING ALCOHOL DO YOU HAVE ON A TYPICAL DAY: PATIENT DOES NOT DRINK
PRESCIPTION_ABUSE_PAST_12_MONTHS: NO
AUDIT-C TOTAL SCORE: 0

## 2023-11-11 ASSESSMENT — COLUMBIA-SUICIDE SEVERITY RATING SCALE - C-SSRS
2. HAVE YOU ACTUALLY HAD ANY THOUGHTS OF KILLING YOURSELF?: NO
6. HAVE YOU EVER DONE ANYTHING, STARTED TO DO ANYTHING, OR PREPARED TO DO ANYTHING TO END YOUR LIFE?: NO
1. IN THE PAST MONTH, HAVE YOU WISHED YOU WERE DEAD OR WISHED YOU COULD GO TO SLEEP AND NOT WAKE UP?: NO

## 2023-11-11 ASSESSMENT — PAIN - FUNCTIONAL ASSESSMENT: PAIN_FUNCTIONAL_ASSESSMENT: 0-10

## 2023-11-11 NOTE — CONSULTS
Reason For Consult  ESRD on hemodialysis     History Of Present Illness  Edwar Hemphill is a 38 y.o. male with a past medical history of end-stage renal disease on hemodialysis via right femoral TDC who resides at [facility]. 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  He later had stump debridement on 7/27 with wound VAC placement.  He had polymicrobial infection including resistant E coli, Acenetobacter baumannii and Enterobacter fecalis. He was admitted in September with positive blood cultures growing Acenetobacter baumannii and Stenotrophomonas. He went to IR for dialysis line exchange.  He comes in now due to not feeling well. CT imaging of the left limp noted a 6.3 cm fluid collection but no evidence of soft tissue gas. He was started on antimicrobial coverage. Nephrology was consulted for renal care.      Past Medical History:   Diagnosis Date    Essential (primary) hypertension 05/26/2017    HTN (hypertension), benign    Personal history of other endocrine, nutritional and metabolic disease 05/26/2017    History of diabetes mellitus       Past Surgical History:   Procedure Laterality Date    CT AORTA AND BILATERAL ILIOFEMORAL RUNOFF ANGIOGRAM W AND/OR WO IV CONTRAST  2/9/2023    CT AORTA AND BILATERAL ILIOFEMORAL RUNOFF ANGIOGRAM W AND/OR WO IV CONTRAST 2/9/2023 DOCTOR OFFICE LEGACY             Family History  Family History   Problem Relation Name Age of Onset    Other (DIABETES DUE TO UNDERLYING CONDITION WITH MICROALBUMINURIA) Father      Other (DIABETES DUE TO UNDERLYING CONDITION WITH MICROALBUMINURIA [Other]) Other AUNT     Other (DIABETES DUE TO UNDERLYING CONDITION WITH  "MICROALBUMINURIA [Other]) Other GP         Allergies  Patient has no known allergies.    Review of Systems   10 point review of systems obtained and negative unless stated in HPI:     Physical Exam   Constitutional: Well developed, awake/alert/oriented  x3   Eyes: Periorbital swelling   ENMT: mucous membranes moist   Head/Neck: Facial edema  Difficult to assess JVD   Respiratory/Thorax: Diminished bibasilar breath sounds,  no wheezing, rales or rhonchi   Cardiovascular: regular rhythm, normal  S1 and S2   Gastrointestinal: Nondistended, soft, non-tender,  no rebound tenderness or guarding   Genitourinary: No Castanon   Extremities: Pitting right leg edema  L AKA   RUE amputation  RLE HD cathter   Neurological: No asterixis   Psychological: Appropriate mood and behavior   Skin: L AKA stump  Right femoral TDC           I&O 24HR    Intake/Output Summary (Last 24 hours) at 11/10/2023 2052  Last data filed at 11/10/2023 1800  Gross per 24 hour   Intake 1100 ml   Output --   Net 1100 ml       Vitals 24HR  Heart Rate:  [106-142]   Temp:  [38 °C (100.4 °F)-39.6 °C (103.2 °F)]   Resp:  [17-25]   BP: (102-171)/()   Height:  [175.3 cm (5' 9.02\")]   Weight:  [79.4 kg (175 lb 0.7 oz)]   SpO2:  [95 %-99 %]     Scheduled Medications  [START ON 11/11/2023] pantoprazole, 40 mg, oral, Daily before breakfast  piperacillin-tazobactam, 2.25 g, intravenous, q8h  [START ON 11/11/2023] tigecycline, 50 mg, intravenous, q12h STEPHY      Continuous medications       PRN medications: acetaminophen **OR** acetaminophen **OR** acetaminophen, melatonin, ondansetron **OR** ondansetron, simethicone, vancomycin     Relevant Results  Results from last 7 days   Lab Units 11/10/23  1207   WBC AUTO x10*3/uL 19.7*   HEMOGLOBIN g/dL 10.2*   HEMATOCRIT % 32.7*   PLATELETS AUTO x10*3/uL 370   NEUTROS PCT AUTO % 91.4   LYMPHS PCT AUTO % 2.2   MONOS PCT AUTO % 3.9   EOS PCT AUTO % 1.4      Results from last 7 days   Lab Units 11/10/23  1207   SODIUM mmol/L " 133*   POTASSIUM mmol/L 5.3   CHLORIDE mmol/L 92*   CO2 mmol/L 24   BUN mg/dL 47*   CREATININE mg/dL 7.92*   CALCIUM mg/dL 8.8   PROTEIN TOTAL g/dL 7.1   BILIRUBIN TOTAL mg/dL 0.7   ALK PHOS U/L 401*   ALT U/L 10   AST U/L 10   GLUCOSE mg/dL 131*      XR chest 1 view   Final Result   Linear scarring and/or discoid atelectasis with some blunting of the   left lateral costophrenic angle remain.  On today's exam there is also   questionable very tiny right pleural effusion.  The lungs otherwise   are clear and there is no pneumothorax..   Signed by Daniel Shay MD      CT femur left wo IV contrast   Final Result   THIS STUDY WAS PERFORMED WITHOUT IV CONTRAST.  THERE IS A 6.3 X 4.6 X   2.7 CM FLUID COLLECTION WHICH EXTENDS FROM THE AMPUTATION MARGIN TO   THE UNDERLYING SKIN.  INFECTED FLUID IS NOT EXCLUDED.  WITHOUT IV   CONTRAST, I COULD NOT EXCLUDE ABSCESS.  THERE IS HOWEVER NO EVIDENCE   OF SOFT TISSUE GAS.   THERE IS BENIGN PERIOSTEAL NEW BONE FORMATION SEEN ABOUT THE RESECTION   MARGIN WHICH MIGHT SIMPLY BE RELATED TO THE AMPUTATION ITSELF.   ___    Signed by Nakul Kc MD            Assessment/Plan     Edwar Hemphill is a 38 y.o. male with a past medical history of end-stage renal disease on hemodialysis via right femoral TDC who resides at [facility]. 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  He later had stump debridement on 7/27 with wound VAC placement.  He had polymicrobial infection including resistant E coli, Acenetobacter baumannii and Enterobacter fecalis. He was admitted in September with positive blood cultures growing Acenetobacter baumannii and Stenotrophomonas. He  went to IR for dialysis line exchange.  He comes in now due to not feeling well. CT imaging of the left limp noted a 6.3 cm fluid collection but no evidence of soft tissue gas. He was started on antimicrobial coverage. Nephrology was consulted for renal care.     Mr. Hemphill reports missed dialysis since Monday due to not feeling well. He is breathing comfortably on 2 LPM NC. His electrolytes are acceptable. We will plan for iHD tomorrow. I will order erythropoietin. Will follow.     Principal Problem:    Cellulitis and abscess of left leg      I spent 45 minutes in the professional and overall care of this patient.      LIZETH Hassannpatient consult to Nephrology  Consult performed by: Eber Bruce DO  Consult ordered by: Eva Rios MD

## 2023-11-11 NOTE — H&P
HISTORY AND PHYSICAL EXAMINATION - INTERNAL MEDICINE    PATIENT NAME: Edwar Hemphill    MRN: [id number]  SERVICE DATE: 11/11/2023   SERVICE TIME:  10:15 AM    PRIMARY CARE PHYSICIAN:  Mauricio Estrada MD    ASSESSMENT & PLAN     Left stump infection--culture with gpc in pairs/chains , id consulted, on iv antibiotics    Esrd --> nephrology on consult     Htn    T2dm--> ssi and insulin     Pvd with left aka     Rt above elbow amputation     Hyponatremia on admit     Hyperkalemia/hyperphosphatemia --> treatment per renal     Anemia of chr disease    Nagma     Pain control with ecf regime      SUBJECTIVE  CHIEF COMPLAINT:   fever    HISTORY OF PRESENT ILLNESS:  Mr. Hemphill is a 38 y.o. male who presents with fever at ecf    PAST MEDICAL HISTORY:    Past Medical History:   Diagnosis Date    Essential (primary) hypertension 05/26/2017    HTN (hypertension), benign    Personal history of other endocrine, nutritional and metabolic disease 05/26/2017    History of diabetes mellitus       PAST SURGICAL HISTORY:    Past Surgical History:   Procedure Laterality Date    CT AORTA AND BILATERAL ILIOFEMORAL RUNOFF ANGIOGRAM W AND/OR WO IV CONTRAST  2/9/2023    CT AORTA AND BILATERAL ILIOFEMORAL RUNOFF ANGIOGRAM W AND/OR WO IV CONTRAST 2/9/2023 DOCTOR OFFICE LEGACY       FAMILY HISTORY:    Family History   Problem Relation Name Age of Onset    Other (DIABETES DUE TO UNDERLYING CONDITION WITH MICROALBUMINURIA) Father      Other (DIABETES DUE TO UNDERLYING CONDITION WITH MICROALBUMINURIA [Other]) Other AUNT     Other (DIABETES DUE TO UNDERLYING CONDITION WITH MICROALBUMINURIA [Other]) Other GP        SOCIAL HISTORY:  non smoker or drinker     MEDICATIONS:  Medications Prior to Admission   Medication Sig Dispense Refill Last Dose    simethicone (Mylicon) 80 mg chewable tablet Chew 1 tablet (80 mg) every 8 hours if needed for flatulence.          ALLERGIES:  No Known Allergies    COMPLETE REVIEW OF SYSTEMS:    PAIN ASSESSMENT:  + chronic pain.  GENERAL: No weight loss, malaise but +for fevers.  HEENT: Negative for frequent or significant headaches, No changes in hearing or vision, no nose bleeds or other nasal problems  NECK: Negative for lumps, goiter, pain and significant neck swelling  RESPIRATORY: Negative for cough, hemoptysis, wheezing or shortness of breath  CARDIOVASCULAR: Negative for chest pain, leg swelling or palpitations.  GI: No nausea, vomiting, or diarrhea  : No history of dysuria, frequency or incontinence.  MUSCULOSKELETAL: Negative for joint pain or swelling, back pain or muscle pain.  SKIN: Negative for lesions, rash, and itching.  PSYCH: Negative for sleep disturbance, mood disorder and recent psychosocial stressors.  HEMATOLOGY/LYMPHOLOGY: Negative for prolonged bleeding, bruising easily or swollen nodes.  ENDOCRINE: Negative for cold or heat intolerance, polyuria, polydipsia and goiter.  NEURO: No history of headaches, syncope, paralysis, seizures or tremors      OBJECTIVE  PHYSICAL EXAM:  Patient Vitals for the past 24 hrs:   BP Temp Temp src Pulse Resp SpO2 Height Weight   11/11/23 0809 152/76 37 °C (98.6 °F) Oral (!) 113 16 96 % -- --   11/11/23 0545 -- -- -- (!) 117 -- -- -- --   11/11/23 0302 140/81 37 °C (98.6 °F) -- (!) 120 18 93 % -- --   11/11/23 0215 161/90 -- -- (!) 117 18 -- -- --   11/11/23 0200 160/90 -- -- (!) 117 18 94 % -- --   11/11/23 0045 (!) 153/93 -- -- (!) 119 18 -- -- --   11/11/23 0030 (!) 161/92 -- -- (!) 120 18 98 % -- --   11/11/23 0015 (!) 151/92 37.2 °C (98.9 °F) Oral (!) 118 18 94 % -- --   11/11/23 0000 (!) 150/93 -- -- (!) 120 16 95 % -- --   11/10/23 2357 (!) 157/99 -- -- (!) 120 18 96 % -- --   11/10/23 1900 116/68 -- -- 106 19 97 % -- --   11/10/23 1830 112/68 -- -- 108 18 97 % -- --   11/10/23 1800 102/63 -- -- (!) 113 17 98 % -- --   11/10/23 1740 -- 38 °C (100.4 °F) Oral -- -- -- -- --   11/10/23 1730 107/74 -- -- (!) 123 20 98 % -- --   11/10/23 1700 124/73 -- -- (!) 130 25  "95 % -- --   11/10/23 1630 136/82 -- -- (!) 135 25 96 % -- --   11/10/23 1625 -- 38.7 °C (101.7 °F) -- -- -- -- -- --   11/10/23 1612 -- -- -- -- -- -- 1.753 m (5' 9.02\") 79.4 kg (175 lb 0.7 oz)   11/10/23 1600 (!) 171/96 -- -- (!) 134 24 -- -- --   11/10/23 1530 (!) 168/106 -- -- (!) 134 19 -- -- --   11/10/23 1505 -- 39.6 °C (103.2 °F) -- -- -- -- -- --   11/10/23 1500 (!) 167/100 -- -- (!) 135 22 99 % -- --   11/10/23 1430 (!) 168/100 -- -- (!) 133 20 99 % -- --   11/10/23 1400 (!) 165/103 -- -- (!) 134 17 98 % -- --   11/10/23 1330 (!) 150/101 -- -- (!) 139 23 95 % -- --   11/10/23 1230 165/90 -- -- (!) 139 19 98 % -- --   11/10/23 1201 160/90 39.3 °C (102.7 °F) Oral (!) 142 24 97 % -- --     Body mass index is 25.84 kg/m².  GENERAL: alert, no distress, cooperative- no visitors present at the bedside  SKIN: limited exam.  HEAD/SINUSES: No significant findings. Head atraumatic and normocephalic   EYES: PERRLA and EOMI  EARS: External ears normal  NOSE: Nares normal. Septum midline.  OROPHARYNX: Lips, mucosa, and tongue normal. Teeth and gums normal. Tongue midline. Oropharynx normal.  NECK: no jugulovenous distention, no carotid bruits, carotid pulse normal contour, supple, thyroid is normal  BACK: No CVAT.  LUNGS: sym exp, no dullness to percussion, Lungs clear to auscultation. Good diaphragmatic excursion.   CARDIAC: normal S1 and S2; no rubs, murmurs, or gallops, s3 or s4.  PMI 5th ICS 1 cm lateral-  Chest - Symmetrical chest wall expansion  Lymphatic: Neck, axilla and groin neg  ABDOMEN: Abdomen soft, non-tender. BS normal. No masses or organomegaly. No hernia, No guarding, no rigidity   EXTREMITIES: left aka, rt above elbow amp, rt leg 1+edema  NEURO: Cranial nerves II-XII intact. Sensation intact and tone appears normal- TOMAS  PULSES:  radial, brachial, femoral palpable 2+  TDC rt groin  GENITALIA/RECTAL: Deferred  MS: DIP, PIP changes of DJD- no clubbing, no effusion     DATA:   Diagnostic tests reviewed " for today's visit:    Most recent labs  Most recent imaging  t75  SIGNATURE: Mauricio Estrada MD  DATE: November 11, 2023  TIME: 10:15 AM

## 2023-11-11 NOTE — CARE PLAN
The patient's goals for the shift include  pain control    The clinical goals for the shift include pt will remain vitally stable    Over the shift, the patient did not make progress toward the following goals. Barriers to progression include refusal of care      Problem: Pain  Goal: My pain/discomfort is manageable  11/11/2023 0541 by Emerita Ellington RN  Outcome: Not Progressing  11/11/2023 0421 by Emerita Ellington RN  Outcome: Not Progressing     Problem: Safety  Goal: Patient will be injury free during hospitalization  11/11/2023 0541 by Emerita Ellington RN  Outcome: Not Progressing  11/11/2023 0421 by Emerita Ellington RN  Outcome: Not Progressing  Goal: I will remain free of falls  11/11/2023 0541 by Emerita Ellington RN  Outcome: Not Progressing  11/11/2023 0421 by Emerita Ellington RN  Outcome: Not Progressing     Problem: Daily Care  Goal: Daily care needs are met  11/11/2023 0541 by Emerita Ellington RN  Outcome: Not Progressing  11/11/2023 0421 by Emerita Ellington RN  Outcome: Not Progressing     Problem: Psychosocial Needs  Goal: Demonstrates ability to cope with hospitalization/illness  11/11/2023 0541 by Emerita Ellington RN  Outcome: Not Progressing  11/11/2023 0421 by Emerita Ellington RN  Outcome: Not Progressing  Goal: Collaborate with me, my family, and caregiver to identify my specific goals  11/11/2023 0541 by Emerita Ellington RN  Outcome: Not Progressing  11/11/2023 0421 by Emerita Ellington RN  Outcome: Not Progressing     Problem: Discharge Barriers  Goal: My discharge needs are met  11/11/2023 0541 by Emerita Ellington RN  Outcome: Not Progressing  11/11/2023 0421 by Emerita Ellington RN  Outcome: Not Progressing     Problem: Skin  Goal: Decreased wound size/increased tissue granulation at next dressing change  Outcome: Not Progressing  Goal: Participates in plan/prevention/treatment measures  Outcome: Not Progressing  Goal: Prevent/manage excess moisture  Outcome: Not Progressing  Goal: Prevent/minimize  sheer/friction injuries  Outcome: Not Progressing  Goal: Promote/optimize nutrition  Outcome: Not Progressing  Goal: Promote skin healing  Outcome: Not Progressing

## 2023-11-11 NOTE — NURSING NOTE
"This nurse attempted to do Pt admission screening and per Pt \"Leave him alone and he's not answering any questions. Nolan HIGGINS was at bedside. Pt nurse Emerita THORNTON notified  "

## 2023-11-11 NOTE — CARE PLAN
The patient's goals for the shift include      The clinical goals for the shift include      Over the shift, the patient did not make progress toward the following goals. Barriers to progression include refusal of care.     Problem: Pain  Goal: My pain/discomfort is manageable  Outcome: Not Progressing     Problem: Safety  Goal: Patient will be injury free during hospitalization  Outcome: Not Progressing  Goal: I will remain free of falls  Outcome: Not Progressing     Problem: Daily Care  Goal: Daily care needs are met  Outcome: Not Progressing     Problem: Psychosocial Needs  Goal: Demonstrates ability to cope with hospitalization/illness  Outcome: Not Progressing  Goal: Collaborate with me, my family, and caregiver to identify my specific goals  Outcome: Not Progressing     Problem: Discharge Barriers  Goal: My discharge needs are met  Outcome: Not Progressing

## 2023-11-11 NOTE — CONSULTS
Inpatient consult to Infectious Diseases  Consult performed by: Wendy Peters DO  Consult ordered by: Eva Rios MD          Referred by Dr Natalie Salmeron MD: Christopher D'Amico, DO    Reason For Consult  Hx of HRO bacteremia, +fever    History Of Present Illness  Edwar Hemphill is a 38 y.o. male presenting with fever. He has a hx of DM, ESRD on HD who presented because he was not feeling well. He has a hx of left AKA with recent stump infection. He notes fever and had documented fever on admission    Recent Sept 2023 admission for A. Baumannii and Stenotrophomonas bacteremia thought to be due to LLE stump infection. He was discharged at that time with tigecycline and levofloxacin    He was empirically started on tigecycline and pip tazo on admission    He denies any left AKA stump pain, tells me it is healing well. No sick contacts       Past Medical History  He has a past medical history of Essential (primary) hypertension (05/26/2017) and Personal history of other endocrine, nutritional and metabolic disease (05/26/2017).    Surgical History  He has a past surgical history that includes CT angio aorta and bilateral iliofemoral runoff w and or wo IV contrast (2/9/2023).     Social History  He has no history on file for tobacco use, alcohol use, and drug use.   Travel Screening       Question Response    Do you have any of the following new or worsening symptoms? None of these    In the last 10 days, have you been in contact with someone who was confirmed or suspected to have Coronavirus/COVID-19? No / Unsure    Have you had a COVID-19 viral test in the last 10 days? No    Have you traveled internationally or domestically in the last month? No          Travel History   Travel since 10/11/23    No documented travel since 10/11/23           Family History  Family History   Problem Relation Name Age of Onset    Other (DIABETES DUE TO UNDERLYING CONDITION WITH MICROALBUMINURIA) Father      Other  "(DIABETES DUE TO UNDERLYING CONDITION WITH MICROALBUMINURIA [Other]) Other AUNT     Other (DIABETES DUE TO UNDERLYING CONDITION WITH MICROALBUMINURIA [Other]) Other GP      Allergies  Patient has no known allergies.     Immunization History   Administered Date(s) Administered    Flu vaccine (IIV4), preservative free *Check age/dose* 09/17/2020    Pneumococcal polysaccharide vaccine, 23-valent, age 2 years and older (PNEUMOVAX 23) 10/02/2006    Tdap vaccine, age 7 year and older (BOOSTRIX) 10/23/2014     Review of Systems  +fever and chills, no cough or SOB     Physical Exam  General: AAOx3, NAD, nontoxic appearing  Eyes: PERRL, EOMI, no scleral icterus  ENT: no oral thrush or lesions  CV: RRR, +S1/S2  Resp: lungs CTA b/l, normal resp effort  Abd: soft, nontender, nondistended, +bowel sounds  : no dodd  Ext: s/p left AKA with wound vac  Skin: right groin TDC has a dirty dressing     Range of Vitals (last 24 hours)  Heart Rate:  [106-142]   Temp:  [37 °C (98.6 °F)-39.6 °C (103.2 °F)]   Resp:  [16-25]   BP: (102-171)/()   Height:  [175.3 cm (5' 9.02\")]   Weight:  [79.4 kg (175 lb 0.7 oz)]   SpO2:  [93 %-99 %]     Relevant Results  Lab Results   Component Value Date    WBC 19.7 (H) 11/10/2023    HGB 10.2 (L) 11/10/2023    HCT 32.7 (L) 11/10/2023    MCV 88 11/10/2023     11/10/2023      Results from last 72 hours   Lab Units 11/10/23  1207   SODIUM mmol/L 133*   POTASSIUM mmol/L 5.3   CHLORIDE mmol/L 92*   CO2 mmol/L 24   BUN mg/dL 47*   CREATININE mg/dL 7.92*   GLUCOSE mg/dL 131*   CALCIUM mg/dL 8.8   ANION GAP mmol/L 22*   EGFR mL/min/1.73m*2 8*     Results from last 72 hours   Lab Units 11/10/23  1207   ALK PHOS U/L 401*   BILIRUBIN TOTAL mg/dL 0.7   PROTEIN TOTAL g/dL 7.1   ALT U/L 10   AST U/L 10   ALBUMIN g/dL 3.1*     Estimated Creatinine Clearance: 12.5 mL/min (A) (by C-G formula based on SCr of 7.92 mg/dL (H)).  CRP   Date/Time Value Ref Range Status   07/24/2023 11:24 AM 8.04 (A) mg/dL Final     " "Comment:     REF VALUE  < 1.00     07/24/2023 07:03 AM CANCELED       Comment:     Result canceled by the ancillary.   07/21/2023 09:51 AM 11.83 (A) mg/dL Final     Comment:     REF VALUE  < 1.00       Sedimentation Rate   Date/Time Value Ref Range Status   07/02/2023 04:55 PM >130 (H) 0 - 15 mm/h Final   06/18/2023 02:56  (H) 0 - 15 mm/h Final   02/09/2023 12:12  (H) 0 - 15 mm/h Final     No results found for: \"HIV1X2\", \"HIVCONF\", \"[id number]\"  No results found for: \"HCVPCRQUANT\"    Cultures/Micro  No results found for the last 14 days.    11/10 blood cx: one with GPC in pairs and chains     Imaging:  CT LE  IMPRESSION:  THIS STUDY WAS PERFORMED WITHOUT IV CONTRAST.  THERE IS A 6.3 X 4.6 X  2.7 CM FLUID COLLECTION WHICH EXTENDS FROM THE AMPUTATION MARGIN TO  THE UNDERLYING SKIN.  INFECTED FLUID IS NOT EXCLUDED.  WITHOUT IV  CONTRAST, I COULD NOT EXCLUDE ABSCESS.  THERE IS HOWEVER NO EVIDENCE  OF SOFT TISSUE GAS.  THERE IS BENIGN PERIOSTEAL NEW BONE FORMATION SEEN ABOUT THE RESECTION  MARGIN WHICH MIGHT SIMPLY BE RELATED TO THE AMPUTATION ITSELF.    Assessment:  Fever--with now positive blood cultures GPC in pairs and chains. Suspect this is real, but awaiting organism identification  Recent bacteremia due to A. Baumannii and stenotrophomonas s/p course of tigecycline/levofloxacin  ESRD on HD    Plan/Recommendations:  -tigecycline ok for now  -agree with vancomycin as we await blood cultures  -pip tazo ok for now--gives us Pseudomonas coverage  -follow up blood cx--currently with GPC, will follow up on organism identification and repeat in the AM.  -will most likley need femoral HD line exchange/removal  -Ortho/surgery consult for possible debridement of the lower extremity fluid collection--could be the source vs HD line  -checking TTE and COVID test for completeness  -will follow    Discussed with patient's RN, Joseph Peters, DO  [facility]  #[phone]      "

## 2023-11-11 NOTE — PROCEDURES
Edwar Hemphill is a 38 y.o. male with a past medical history of end-stage renal disease on hemodialysis via right femoral TDC who resides at [facility]. 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  He later had stump debridement on 7/27 with wound VAC placement.  He had polymicrobial infection including resistant E coli, Acenetobacter baumannii and Enterobacter fecalis. He was admitted in September with positive blood cultures growing Acenetobacter baumannii and Stenotrophomonas. He went to IR for dialysis line exchange.  He comes in now due to not feeling well. CT imaging of the left limp noted a 6.3 cm fluid collection but no evidence of soft tissue gas. He was started on antimicrobial coverage.     11/11/2023  Seen on dialysis. He is tolerating conventional dialysis without issues.  He is dialyzing on a 2K bath with a target UF 2.5 kg as tolerated.  F 160 with BFR//700 mL/minute, 2.5 calcium. We will plan for dialysis again on Monday.  Blood culture is pending, ordered culture from the hemodialysis catheter.  Will await for culture results before taking the hemodialysis catheter as per my discussion with the infectious disease doctor Dr. Peters.

## 2023-11-12 LAB
ALBUMIN SERPL BCP-MCNC: 2.5 G/DL (ref 3.4–5)
ANION GAP SERPL CALC-SCNC: 24 MMOL/L (ref 10–20)
BUN SERPL-MCNC: 35 MG/DL (ref 6–23)
CALCIUM SERPL-MCNC: 8.7 MG/DL (ref 8.6–10.3)
CHLORIDE SERPL-SCNC: 91 MMOL/L (ref 98–107)
CO2 SERPL-SCNC: 20 MMOL/L (ref 21–32)
CREAT SERPL-MCNC: 5.81 MG/DL (ref 0.5–1.3)
ERYTHROCYTE [DISTWIDTH] IN BLOOD BY AUTOMATED COUNT: 18.6 % (ref 11.5–14.5)
GFR SERPL CREATININE-BSD FRML MDRD: 12 ML/MIN/1.73M*2
GLUCOSE BLD MANUAL STRIP-MCNC: 181 MG/DL (ref 74–99)
GLUCOSE BLD MANUAL STRIP-MCNC: 295 MG/DL (ref 74–99)
GLUCOSE BLD MANUAL STRIP-MCNC: 336 MG/DL (ref 74–99)
GLUCOSE BLD MANUAL STRIP-MCNC: 345 MG/DL (ref 74–99)
GLUCOSE SERPL-MCNC: 273 MG/DL (ref 74–99)
HCT VFR BLD AUTO: 29.4 % (ref 41–52)
HGB BLD-MCNC: 9.1 G/DL (ref 13.5–17.5)
MCH RBC QN AUTO: 27.2 PG (ref 26–34)
MCHC RBC AUTO-ENTMCNC: 31 G/DL (ref 32–36)
MCV RBC AUTO: 88 FL (ref 80–100)
NRBC BLD-RTO: 0 /100 WBCS (ref 0–0)
PHOSPHATE SERPL-MCNC: 5.8 MG/DL (ref 2.5–4.9)
PLATELET # BLD AUTO: 312 X10*3/UL (ref 150–450)
POTASSIUM SERPL-SCNC: 4.5 MMOL/L (ref 3.5–5.3)
RBC # BLD AUTO: 3.35 X10*6/UL (ref 4.5–5.9)
SODIUM SERPL-SCNC: 130 MMOL/L (ref 136–145)
WBC # BLD AUTO: 11.2 X10*3/UL (ref 4.4–11.3)

## 2023-11-12 PROCEDURE — 36415 COLL VENOUS BLD VENIPUNCTURE: CPT | Performed by: NURSE PRACTITIONER

## 2023-11-12 PROCEDURE — 2500000002 HC RX 250 W HCPCS SELF ADMINISTERED DRUGS (ALT 637 FOR MEDICARE OP, ALT 636 FOR OP/ED): Performed by: SPECIALIST

## 2023-11-12 PROCEDURE — 36415 COLL VENOUS BLD VENIPUNCTURE: CPT | Performed by: PHYSICIAN ASSISTANT

## 2023-11-12 PROCEDURE — 2500000004 HC RX 250 GENERAL PHARMACY W/ HCPCS (ALT 636 FOR OP/ED): Performed by: PHARMACIST

## 2023-11-12 PROCEDURE — 85027 COMPLETE CBC AUTOMATED: CPT | Performed by: NURSE PRACTITIONER

## 2023-11-12 PROCEDURE — 2500000004 HC RX 250 GENERAL PHARMACY W/ HCPCS (ALT 636 FOR OP/ED): Performed by: INTERNAL MEDICINE

## 2023-11-12 PROCEDURE — 1200000002 HC GENERAL ROOM WITH TELEMETRY DAILY

## 2023-11-12 PROCEDURE — 80069 RENAL FUNCTION PANEL: CPT | Performed by: NURSE PRACTITIONER

## 2023-11-12 PROCEDURE — 82947 ASSAY GLUCOSE BLOOD QUANT: CPT

## 2023-11-12 RX ORDER — DEXTROSE MONOHYDRATE 100 MG/ML
0.3 INJECTION, SOLUTION INTRAVENOUS ONCE AS NEEDED
Status: DISCONTINUED | OUTPATIENT
Start: 2023-11-12 | End: 2023-11-12

## 2023-11-12 RX ORDER — SEVELAMER CARBONATE 800 MG/1
2400 TABLET, FILM COATED ORAL
COMMUNITY
Start: 2022-03-25

## 2023-11-12 RX ORDER — DEXTROSE 50 % IN WATER (D50W) INTRAVENOUS SYRINGE
25
Status: DISCONTINUED | OUTPATIENT
Start: 2023-11-12 | End: 2023-11-12

## 2023-11-12 RX ORDER — INSULIN GLARGINE 100 [IU]/ML
10 INJECTION, SOLUTION SUBCUTANEOUS NIGHTLY
Status: DISCONTINUED | OUTPATIENT
Start: 2023-11-12 | End: 2023-11-17 | Stop reason: HOSPADM

## 2023-11-12 RX ORDER — INSULIN LISPRO 100 [IU]/ML
0-15 INJECTION, SOLUTION INTRAVENOUS; SUBCUTANEOUS
Status: DISCONTINUED | OUTPATIENT
Start: 2023-11-12 | End: 2023-11-17 | Stop reason: HOSPADM

## 2023-11-12 RX ORDER — SEVELAMER CARBONATE 800 MG/1
1600 TABLET, FILM COATED ORAL
Status: DISCONTINUED | OUTPATIENT
Start: 2023-11-12 | End: 2023-11-17 | Stop reason: HOSPADM

## 2023-11-12 RX ADMIN — INSULIN LISPRO 12 UNITS: 100 INJECTION, SOLUTION INTRAVENOUS; SUBCUTANEOUS at 13:27

## 2023-11-12 RX ADMIN — INSULIN LISPRO 12 UNITS: 100 INJECTION, SOLUTION INTRAVENOUS; SUBCUTANEOUS at 17:11

## 2023-11-12 RX ADMIN — PIPERACILLIN SODIUM AND TAZOBACTAM SODIUM 2.25 G: 2; .25 INJECTION, SOLUTION INTRAVENOUS at 13:27

## 2023-11-12 RX ADMIN — DEXTROSE MONOHYDRATE 50 MG: 50 INJECTION, SOLUTION INTRAVENOUS at 02:20

## 2023-11-12 RX ADMIN — INSULIN GLARGINE 10 UNITS: 100 INJECTION, SOLUTION SUBCUTANEOUS at 22:10

## 2023-11-12 RX ADMIN — PIPERACILLIN SODIUM AND TAZOBACTAM SODIUM 2.25 G: 2; .25 INJECTION, SOLUTION INTRAVENOUS at 03:52

## 2023-11-12 RX ADMIN — INSULIN LISPRO 3 UNITS: 100 INJECTION, SOLUTION INTRAVENOUS; SUBCUTANEOUS at 09:26

## 2023-11-12 RX ADMIN — PIPERACILLIN SODIUM AND TAZOBACTAM SODIUM 2.25 G: 2; .25 INJECTION, SOLUTION INTRAVENOUS at 22:04

## 2023-11-12 ASSESSMENT — COGNITIVE AND FUNCTIONAL STATUS - GENERAL
DAILY ACTIVITIY SCORE: 10
WALKING IN HOSPITAL ROOM: TOTAL
TOILETING: TOTAL
PERSONAL GROOMING: TOTAL
MOVING TO AND FROM BED TO CHAIR: A LOT
STANDING UP FROM CHAIR USING ARMS: TOTAL
TURNING FROM BACK TO SIDE WHILE IN FLAT BAD: A LITTLE
CLIMB 3 TO 5 STEPS WITH RAILING: TOTAL
MOBILITY SCORE: 11
MOVING FROM LYING ON BACK TO SITTING ON SIDE OF FLAT BED WITH BEDRAILS: A LITTLE
DRESSING REGULAR LOWER BODY CLOTHING: TOTAL
DRESSING REGULAR UPPER BODY CLOTHING: A LOT
HELP NEEDED FOR BATHING: TOTAL

## 2023-11-12 ASSESSMENT — PAIN - FUNCTIONAL ASSESSMENT: PAIN_FUNCTIONAL_ASSESSMENT: 0-10

## 2023-11-12 ASSESSMENT — PAIN SCALES - GENERAL: PAINLEVEL_OUTOF10: 0 - NO PAIN

## 2023-11-12 ASSESSMENT — ACTIVITIES OF DAILY LIVING (ADL): LACK_OF_TRANSPORTATION: PATIENT DECLINED

## 2023-11-12 NOTE — CARE PLAN
Chart reviewed. Blood cultures with GPC in clusters and GBS    Spoke with Dr Rollins yesterday and blood cultures pending through HD line (femoral)    Anticipate the need for removal/exchange of femoral line on Monday by IR    Will stop tigecycline. Continue vanco/pip tazo for now until more blood culture data available    Will order general surgery consult to review the LLE imaging for ?I&D as source is HD line vs LLE. Ordering echo also    Wendy Peters, DO  [facility]  #[phone]

## 2023-11-12 NOTE — PROGRESS NOTES
Surgery consult received on this patient.  He has a history of AKA with possible infectious complications.  This is a consult that should be redirected toward either vascular surgery or orthopedics.  General surgery generally does not get involved in stump complications in patients with amputations.    Jerman

## 2023-11-12 NOTE — PROGRESS NOTES
Edwar Hemphill is a 38 y.o. male on day 2 of admission presenting with Cellulitis and abscess of left leg.      Subjective   Pain +       Objective     Last Recorded Vitals  /87 (BP Location: Left arm, Patient Position: Lying)   Pulse 105   Temp 36.4 °C (97.6 °F) (Oral)   Resp 18   Wt 79.4 kg (175 lb 0.7 oz)   SpO2 99%   Intake/Output last 3 Shifts:    Intake/Output Summary (Last 24 hours) at 11/12/2023 1310  Last data filed at 11/11/2023 1712  Gross per 24 hour   Intake 1250 ml   Output 2500 ml   Net -1250 ml       Admission Weight  Weight: 79.4 kg (175 lb 0.7 oz) (11/10/23 1612)    Daily Weight  11/10/23 : 79.4 kg (175 lb 0.7 oz)    Image Results  XR chest 1 view  Narrative: STUDY:  Chest Radiograph;  11/10/2023 3:39 PM  INDICATION:  Chest pain with shortness of breath.  COMPARISON:  9/14/2023 XR chest.  ACCESSION NUMBER(S):  [id number]  ORDERING CLINICIAN:  JOSE ANTONIO THURMAN  TECHNIQUE:  Frontal chest was obtained at 15:30 hours.  FINDINGS:  CARDIOMEDIASTINAL SILHOUETTE:  Cardiomediastinal silhouette is normal in size and configuration.     LUNGS:  Linear scarring or discoid atelectasis remains at the left lung base  along with some blunting of the left lateral costophrenic angle.   There also appears to be some minimal blunting of the right lateral  costophrenic angle on today's exam.  There is a catheter extending  superiorly from the abdomen to the area of the lower superior vena  cava..     ABDOMEN:  No remarkable upper abdominal findings.     BONES:  No acute osseous changes.  Impression: Linear scarring and/or discoid atelectasis with some blunting of the  left lateral costophrenic angle remain.  On today's exam there is also  questionable very tiny right pleural effusion.  The lungs otherwise  are clear and there is no pneumothorax..  Signed by Daniel Shay MD  CT femur left wo IV contrast  Narrative: STUDY:  CT Extremity; Completed Time:  11/10/2023 1:27 PM.  INDICATION:  Sepsis, wound vac  left leg.  Tender stump.  Evaluate for abscess.  COMPARISON:  MR LT femur 7/19/2023.  CT left lower extremity 7/2/2023.  ACCESSION NUMBER(S):  [id number]  ORDERING CLINICIAN:  JOSE ANTONIO THURMAN  TECHNIQUE:  Thin section axial images were obtained through the left  femur without intravenous contrast.  Orthogonal reconstructed images  were obtained and reviewed.    Automated mA/kV exposure control was utilized and patient examination  was performed in strict accordance with principles of ALARA.  FINDINGS:   Bone mineralization is decreased..  There is no evidence of acute fracture or dislocation.  An amputation margin is seen involving the distal femoral diaphysis  are 0.21 cm inferior to the lesser trochanter.  There is  benign-appearing periosteal new bone formation seen about the  amputation site which might simply be related to the amputation  itself.  No definite disruption of the cortex is seen however full  cortication of the amputation margin is not present.    Dense atherosclerotic calcific changes are present.  There is congestion of the subcutaneous lymphatics fairly diffusely.  There is thickening of the skin of the lateral 5 which may be related  to edema and/or infection and/or inflammation.  There is a fluid collection seen at the amputation margin which  measures maximally 6.3 cm in transverse as measured on axial series  606 image 188, 4.6 cm in maximum height as measured on series 604,  image 60 and 2.7 cm in maximum AP diameter as measured on series 606  image 188.  Infected fluid is not excluded.  There is no evidence of  soft tissue gas.  Impression: THIS STUDY WAS PERFORMED WITHOUT IV CONTRAST.  THERE IS A 6.3 X 4.6 X  2.7 CM FLUID COLLECTION WHICH EXTENDS FROM THE AMPUTATION MARGIN TO  THE UNDERLYING SKIN.  INFECTED FLUID IS NOT EXCLUDED.  WITHOUT IV  CONTRAST, I COULD NOT EXCLUDE ABSCESS.  THERE IS HOWEVER NO EVIDENCE  OF SOFT TISSUE GAS.  THERE IS BENIGN PERIOSTEAL NEW BONE FORMATION SEEN  ABOUT THE RESECTION  MARGIN WHICH MIGHT SIMPLY BE RELATED TO THE AMPUTATION ITSELF.  ___   Signed by Nakul Kc MD      PHYSICAL EXAM  NEUROLOGICAL: No abnormal movements, no changes from before  HEENT: Head atraumatic, perrl,eomi, no oral lesions, no ear rash   NECK: Supple, no ln, jvp flat, thyroid palp  HEART: S1, S2, no added sound  LUNGS: CTAB, no crackles, no rales, no wheezing, no dullness to percussion, sym exp  EXTREMITIES: no edema  ABDOMEN: Soft, nontender, bowel sound positive, no organomegaly  SKIN: No change  EYES: No changes, perrl, eomi,   ENT: No change    JOINT: No change  Relevant Results               Assessment/Plan      Principal Problem:    Cellulitis and abscess of left leg  Esrd  Anemia chr disease  T2dm    [facility]     D/w RN  t35              Mauricio Estrada MD

## 2023-11-13 ENCOUNTER — APPOINTMENT (OUTPATIENT)
Dept: DIALYSIS | Facility: HOSPITAL | Age: 38
End: 2023-11-13
Payer: MEDICARE

## 2023-11-13 ENCOUNTER — APPOINTMENT (OUTPATIENT)
Dept: CARDIOLOGY | Facility: HOSPITAL | Age: 38
DRG: 314 | End: 2023-11-13
Payer: MEDICARE

## 2023-11-13 LAB
ALBUMIN SERPL BCP-MCNC: 2.6 G/DL (ref 3.4–5)
ANION GAP SERPL CALC-SCNC: 18 MMOL/L (ref 10–20)
BACTERIA BLD AEROBE CULT: ABNORMAL
BACTERIA BLD CULT: ABNORMAL
BUN SERPL-MCNC: 42 MG/DL (ref 6–23)
CALCIUM SERPL-MCNC: 8.3 MG/DL (ref 8.6–10.3)
CHLORIDE SERPL-SCNC: 93 MMOL/L (ref 98–107)
CO2 SERPL-SCNC: 24 MMOL/L (ref 21–32)
CREAT SERPL-MCNC: 6.76 MG/DL (ref 0.5–1.3)
ERYTHROCYTE [DISTWIDTH] IN BLOOD BY AUTOMATED COUNT: 18.4 % (ref 11.5–14.5)
GFR SERPL CREATININE-BSD FRML MDRD: 10 ML/MIN/1.73M*2
GLUCOSE BLD MANUAL STRIP-MCNC: 127 MG/DL (ref 74–99)
GLUCOSE BLD MANUAL STRIP-MCNC: 175 MG/DL (ref 74–99)
GLUCOSE BLD MANUAL STRIP-MCNC: 61 MG/DL (ref 74–99)
GLUCOSE BLD MANUAL STRIP-MCNC: 94 MG/DL (ref 74–99)
GLUCOSE SERPL-MCNC: 86 MG/DL (ref 74–99)
GRAM STN SPEC: ABNORMAL
GRAM STN SPEC: ABNORMAL
HBV SURFACE AG SERPL QL IA: NONREACTIVE
HCT VFR BLD AUTO: 28.2 % (ref 41–52)
HGB BLD-MCNC: 8.9 G/DL (ref 13.5–17.5)
MCH RBC QN AUTO: 26.7 PG (ref 26–34)
MCHC RBC AUTO-ENTMCNC: 31.6 G/DL (ref 32–36)
MCV RBC AUTO: 85 FL (ref 80–100)
NRBC BLD-RTO: 0 /100 WBCS (ref 0–0)
PHOSPHATE SERPL-MCNC: 5.3 MG/DL (ref 2.5–4.9)
PLATELET # BLD AUTO: 307 X10*3/UL (ref 150–450)
POTASSIUM SERPL-SCNC: 4.1 MMOL/L (ref 3.5–5.3)
RBC # BLD AUTO: 3.33 X10*6/UL (ref 4.5–5.9)
SODIUM SERPL-SCNC: 131 MMOL/L (ref 136–145)
VANCOMYCIN TROUGH SERPL-MCNC: 17.8 UG/ML (ref 5–20)
WBC # BLD AUTO: 9.4 X10*3/UL (ref 4.4–11.3)

## 2023-11-13 PROCEDURE — 2500000004 HC RX 250 GENERAL PHARMACY W/ HCPCS (ALT 636 FOR OP/ED): Performed by: PHYSICIAN ASSISTANT

## 2023-11-13 PROCEDURE — 82947 ASSAY GLUCOSE BLOOD QUANT: CPT

## 2023-11-13 PROCEDURE — 80069 RENAL FUNCTION PANEL: CPT | Performed by: NURSE PRACTITIONER

## 2023-11-13 PROCEDURE — 36581 REPLACE TUNNELED CV CATH: CPT | Performed by: RADIOLOGY

## 2023-11-13 PROCEDURE — 2780000003 HC OR 278 NO HCPCS

## 2023-11-13 PROCEDURE — 99222 1ST HOSP IP/OBS MODERATE 55: CPT | Performed by: NURSE PRACTITIONER

## 2023-11-13 PROCEDURE — 2500000005 HC RX 250 GENERAL PHARMACY W/O HCPCS: Performed by: SPECIALIST

## 2023-11-13 PROCEDURE — 2500000004 HC RX 250 GENERAL PHARMACY W/ HCPCS (ALT 636 FOR OP/ED): Performed by: RADIOLOGY

## 2023-11-13 PROCEDURE — 8010000001 HC DIALYSIS - HEMODIALYSIS PER DAY

## 2023-11-13 PROCEDURE — 36415 COLL VENOUS BLD VENIPUNCTURE: CPT | Performed by: INTERNAL MEDICINE

## 2023-11-13 PROCEDURE — 2500000002 HC RX 250 W HCPCS SELF ADMINISTERED DRUGS (ALT 637 FOR MEDICARE OP, ALT 636 FOR OP/ED): Performed by: PHYSICIAN ASSISTANT

## 2023-11-13 PROCEDURE — 2500000004 HC RX 250 GENERAL PHARMACY W/ HCPCS (ALT 636 FOR OP/ED): Performed by: NURSE PRACTITIONER

## 2023-11-13 PROCEDURE — 2500000005 HC RX 250 GENERAL PHARMACY W/O HCPCS: Performed by: RADIOLOGY

## 2023-11-13 PROCEDURE — 2500000004 HC RX 250 GENERAL PHARMACY W/ HCPCS (ALT 636 FOR OP/ED): Performed by: PHARMACIST

## 2023-11-13 PROCEDURE — 36415 COLL VENOUS BLD VENIPUNCTURE: CPT | Mod: AHULAB | Performed by: INTERNAL MEDICINE

## 2023-11-13 PROCEDURE — 87340 HEPATITIS B SURFACE AG IA: CPT | Mod: AHULAB | Performed by: INTERNAL MEDICINE

## 2023-11-13 PROCEDURE — 1100000001 HC PRIVATE ROOM DAILY

## 2023-11-13 PROCEDURE — 2500000001 HC RX 250 WO HCPCS SELF ADMINISTERED DRUGS (ALT 637 FOR MEDICARE OP): Performed by: PHYSICIAN ASSISTANT

## 2023-11-13 PROCEDURE — C1769 GUIDE WIRE: HCPCS

## 2023-11-13 PROCEDURE — 0J2WXYZ CHANGE OTHER DEVICE IN LOWER EXTREMITY SUBCUTANEOUS TISSUE AND FASCIA, EXTERNAL APPROACH: ICD-10-PCS | Performed by: RADIOLOGY

## 2023-11-13 PROCEDURE — 99152 MOD SED SAME PHYS/QHP 5/>YRS: CPT

## 2023-11-13 PROCEDURE — 2720000007 HC OR 272 NO HCPCS

## 2023-11-13 PROCEDURE — 80202 ASSAY OF VANCOMYCIN: CPT | Performed by: PHARMACIST

## 2023-11-13 PROCEDURE — 36581 REPLACE TUNNELED CV CATH: CPT

## 2023-11-13 PROCEDURE — 36415 COLL VENOUS BLD VENIPUNCTURE: CPT | Performed by: NURSE PRACTITIONER

## 2023-11-13 PROCEDURE — 2500000004 HC RX 250 GENERAL PHARMACY W/ HCPCS (ALT 636 FOR OP/ED): Performed by: SPECIALIST

## 2023-11-13 PROCEDURE — C1750 CATH, HEMODIALYSIS,LONG-TERM: HCPCS

## 2023-11-13 PROCEDURE — 85027 COMPLETE CBC AUTOMATED: CPT | Performed by: NURSE PRACTITIONER

## 2023-11-13 PROCEDURE — 77001 FLUOROGUIDE FOR VEIN DEVICE: CPT | Performed by: RADIOLOGY

## 2023-11-13 RX ORDER — HEPARIN SODIUM 1000 [USP'U]/ML
INJECTION, SOLUTION INTRAVENOUS; SUBCUTANEOUS AS NEEDED
Status: DISCONTINUED | OUTPATIENT
Start: 2023-11-13 | End: 2023-11-17 | Stop reason: HOSPADM

## 2023-11-13 RX ORDER — FENTANYL CITRATE 50 UG/ML
INJECTION, SOLUTION INTRAMUSCULAR; INTRAVENOUS AS NEEDED
Status: DISCONTINUED | OUTPATIENT
Start: 2023-11-13 | End: 2023-11-17 | Stop reason: HOSPADM

## 2023-11-13 RX ORDER — VANCOMYCIN HYDROCHLORIDE 1 G/200ML
1000 INJECTION, SOLUTION INTRAVENOUS ONCE
Status: COMPLETED | OUTPATIENT
Start: 2023-11-13 | End: 2023-11-13

## 2023-11-13 RX ORDER — VANCOMYCIN HYDROCHLORIDE 1 G/200ML
1000 INJECTION, SOLUTION INTRAVENOUS ONCE
Status: DISCONTINUED | OUTPATIENT
Start: 2023-11-13 | End: 2023-11-13

## 2023-11-13 RX ORDER — LIDOCAINE HYDROCHLORIDE 20 MG/ML
INJECTION, SOLUTION EPIDURAL; INFILTRATION; INTRACAUDAL; PERINEURAL AS NEEDED
Status: DISCONTINUED | OUTPATIENT
Start: 2023-11-13 | End: 2023-11-17 | Stop reason: HOSPADM

## 2023-11-13 RX ORDER — OXYCODONE HYDROCHLORIDE 5 MG/1
5 TABLET ORAL ONCE
Status: COMPLETED | OUTPATIENT
Start: 2023-11-13 | End: 2023-11-13

## 2023-11-13 RX ADMIN — VANCOMYCIN HYDROCHLORIDE 1000 MG: 1 INJECTION, SOLUTION INTRAVENOUS at 17:24

## 2023-11-13 RX ADMIN — FENTANYL CITRATE 50 MCG: 50 INJECTION INTRAMUSCULAR; INTRAVENOUS at 15:04

## 2023-11-13 RX ADMIN — SEVELAMER CARBONATE 1600 MG: 800 TABLET, FILM COATED ORAL at 17:23

## 2023-11-13 RX ADMIN — OXYCODONE HYDROCHLORIDE 5 MG: 5 TABLET ORAL at 22:41

## 2023-11-13 RX ADMIN — PIPERACILLIN SODIUM AND TAZOBACTAM SODIUM 2.25 G: 2; .25 INJECTION, SOLUTION INTRAVENOUS at 05:50

## 2023-11-13 RX ADMIN — PIPERACILLIN SODIUM AND TAZOBACTAM SODIUM 2.25 G: 2; .25 INJECTION, SOLUTION INTRAVENOUS at 12:50

## 2023-11-13 RX ADMIN — SEVELAMER CARBONATE 1600 MG: 800 TABLET, FILM COATED ORAL at 12:49

## 2023-11-13 RX ADMIN — PANTOPRAZOLE SODIUM 40 MG: 40 TABLET, DELAYED RELEASE ORAL at 05:50

## 2023-11-13 RX ADMIN — PIPERACILLIN SODIUM AND TAZOBACTAM SODIUM 2.25 G: 2; .25 INJECTION, SOLUTION INTRAVENOUS at 21:59

## 2023-11-13 RX ADMIN — INSULIN LISPRO 3 UNITS: 100 INJECTION, SOLUTION INTRAVENOUS; SUBCUTANEOUS at 17:24

## 2023-11-13 RX ADMIN — HEPARIN SODIUM 5800 UNITS: 1000 INJECTION, SOLUTION INTRAVENOUS; SUBCUTANEOUS at 15:19

## 2023-11-13 RX ADMIN — DEXTROSE MONOHYDRATE 25 G: 25 INJECTION, SOLUTION INTRAVENOUS at 08:50

## 2023-11-13 RX ADMIN — LIDOCAINE HYDROCHLORIDE 5 ML: 20 INJECTION, SOLUTION EPIDURAL; INFILTRATION; INTRACAUDAL; PERINEURAL at 15:15

## 2023-11-13 RX ADMIN — OXYCODONE HYDROCHLORIDE 5 MG: 5 TABLET ORAL at 02:04

## 2023-11-13 ASSESSMENT — COGNITIVE AND FUNCTIONAL STATUS - GENERAL
TURNING FROM BACK TO SIDE WHILE IN FLAT BAD: A LITTLE
DRESSING REGULAR UPPER BODY CLOTHING: A LITTLE
MOVING TO AND FROM BED TO CHAIR: A LITTLE
STANDING UP FROM CHAIR USING ARMS: TOTAL
CLIMB 3 TO 5 STEPS WITH RAILING: TOTAL
CLIMB 3 TO 5 STEPS WITH RAILING: TOTAL
TURNING FROM BACK TO SIDE WHILE IN FLAT BAD: A LITTLE
HELP NEEDED FOR BATHING: A LOT
WALKING IN HOSPITAL ROOM: TOTAL
MOVING FROM LYING ON BACK TO SITTING ON SIDE OF FLAT BED WITH BEDRAILS: A LITTLE
MOVING TO AND FROM BED TO CHAIR: A LITTLE
HELP NEEDED FOR BATHING: A LOT
DAILY ACTIVITIY SCORE: 15
PERSONAL GROOMING: A LITTLE
TOILETING: A LOT
MOBILITY SCORE: 13
WALKING IN HOSPITAL ROOM: TOTAL
TOILETING: A LOT
MOBILITY SCORE: 12
DRESSING REGULAR LOWER BODY CLOTHING: TOTAL
PERSONAL GROOMING: A LITTLE
DRESSING REGULAR LOWER BODY CLOTHING: A LITTLE
DRESSING REGULAR UPPER BODY CLOTHING: A LITTLE
STANDING UP FROM CHAIR USING ARMS: A LOT
DAILY ACTIVITIY SCORE: 17
MOVING FROM LYING ON BACK TO SITTING ON SIDE OF FLAT BED WITH BEDRAILS: A LITTLE

## 2023-11-13 ASSESSMENT — PAIN SCALES - GENERAL
PAINLEVEL_OUTOF10: 3
PAINLEVEL_OUTOF10: 5 - MODERATE PAIN
PAINLEVEL_OUTOF10: 7

## 2023-11-13 ASSESSMENT — PAIN - FUNCTIONAL ASSESSMENT
PAIN_FUNCTIONAL_ASSESSMENT: 0-10
PAIN_FUNCTIONAL_ASSESSMENT: 0-10

## 2023-11-13 ASSESSMENT — PAIN DESCRIPTION - LOCATION
LOCATION: LEG
LOCATION: BACK

## 2023-11-13 NOTE — PRE-PROCEDURE NOTE
Interventional Radiology Preprocedure Note    Indication for procedure: The primary encounter diagnosis was Cellulitis and abscess of left leg. Diagnoses of ESRD (end stage renal disease) (CMS/[facility]) and Bacteremia associated with intravascular line, initial encounter (CMS/[facility]) were also pertinent to this visit.    Relevant review of systems:  afebrile today    Relevant Labs:   Lab Results   Component Value Date    CREATININE 6.76 (H) 11/13/2023    EGFR 10 (L) 11/13/2023    INR 1.1 09/13/2023    PROTIME 11.9 09/13/2023       Planned Sedation/Anesthesia: Minimal    Airway assessment: normal    Directed physical examination:    A&Ox3  Breathing Unlabored  RRR  Abdomen soft, nontender    Mallampati: III (soft and hard palate and base of uvula visible)    ASA Score: ASA 4 - Patient with severe systemic disease that is a constant threat to life    Benefits, risks and alternatives of procedure and planned sedation have been discussed with the patient and/or their representative. All questions answered and they agree to proceed.

## 2023-11-13 NOTE — CARE PLAN
Problem: Safety  Goal: Patient will be injury free during hospitalization  Outcome: Progressing  Goal: I will remain free of falls  Outcome: Progressing     Problem: Pain  Goal: My pain/discomfort is manageable  Outcome: Progressing     Problem: Daily Care  Goal: Daily care needs are met  Outcome: Progressing   The patient's goals for the shift include move around in bed    The clinical goals for the shift include patient will remain free from infection    Over the shift, the patient did not make progress toward the following goals. Barriers to progression include non compliance at times . Recommendations to address these barriers include patient education.

## 2023-11-13 NOTE — POST-PROCEDURE NOTE
Interventional Radiology Brief Postprocedure Note    Attending: Ke    Assistant: None    Diagnosis: ESRD    Description of procedure: R groin tunneled HD catheter     Anesthesia:  Local  50 mcg Fentanyl    Complications: None    Estimated Blood Loss: minimal          See detailed result report with images in PACS.    The patient tolerated the procedure well without incident or complication and is in stable condition.

## 2023-11-13 NOTE — PROCEDURES
Seen on dialysis.  We are running him on a 3 potassium bath today.  Monitoring the hemoglobin, will add erythropoietin.  Monitoring the phosphorus, blood pressures will allow at least 2 L of fluid removal today.  His dialysis catheter will be exchanged.

## 2023-11-13 NOTE — CARE PLAN
Problem: Skin  Goal: Decreased wound size/increased tissue granulation at next dressing change  11/13/2023 1752 by Deann Elizabeth RN  Outcome: Progressing  11/13/2023 1708 by Deann Elizabeth RN  Outcome: Progressing  Goal: Participates in plan/prevention/treatment measures  11/13/2023 1752 by Deann Elizabeth RN  Outcome: Progressing  11/13/2023 1708 by Deann Elizabeth RN  Outcome: Progressing  Goal: Prevent/manage excess moisture  11/13/2023 1752 by Deann Elizabeth RN  Outcome: Progressing  11/13/2023 1708 by Deann Elizabeth RN  Outcome: Progressing  Goal: Prevent/minimize sheer/friction injuries  11/13/2023 1752 by Deann Elizabeth RN  Outcome: Progressing  11/13/2023 1708 by Deann Elizabeth RN  Outcome: Progressing  Goal: Promote/optimize nutrition  11/13/2023 1752 by Deann Elizabeth RN  Outcome: Progressing  11/13/2023 1708 by Deann Elizabeth RN  Outcome: Progressing  Goal: Promote skin healing  11/13/2023 1752 by Deann Elizabeth RN  Outcome: Progressing  11/13/2023 1708 by Deann Elizabeth RN  Outcome: Progressing   The patient's goals for the shift include move around in bed    The clinical goals for the shift include to be  afebrile throughout this shift

## 2023-11-13 NOTE — CONSULTS
Consults    Reason For Consult  Right fem line exchange.     History Of Present Illness  Edwar Hemphill is a 38 y.o. male presenting with LLE stump infection with abscess. CLABSI due to staph aureus and staph capitis with right Fem HD line. IR at [facility] last exchanged this line 9/21/23. He had a subsequent line removal with replacement at a [facility] facility on 10/19/23. WBC 9.4.      Past Medical History  He has a past medical history of Essential (primary) hypertension (05/26/2017) and Personal history of other endocrine, nutritional and metabolic disease (05/26/2017).    Surgical History  He has a past surgical history that includes CT angio aorta and bilateral iliofemoral runoff w and or wo IV contrast (2/9/2023).     Social History  He reports that he has been smoking cigarettes. He has a 3.75 pack-year smoking history. He has never used smokeless tobacco. No history on file for alcohol use and drug use.    Family History  Family History   Problem Relation Name Age of Onset    Other (DIABETES DUE TO UNDERLYING CONDITION WITH MICROALBUMINURIA) Father      Other (DIABETES DUE TO UNDERLYING CONDITION WITH MICROALBUMINURIA [Other]) Other AUNT     Other (DIABETES DUE TO UNDERLYING CONDITION WITH MICROALBUMINURIA [Other]) Other GP         Allergies  Patient has no known allergies.    MEDS:    Current Facility-Administered Medications:     acetaminophen (Tylenol) tablet 650 mg, 650 mg, oral, q4h PRN **OR** [DISCONTINUED] acetaminophen (Tylenol) oral liquid 650 mg, 650 mg, nasogastric tube, q4h PRN **OR** [DISCONTINUED] acetaminophen (Tylenol) suppository 650 mg, 650 mg, rectal, q4h PRN, AMARILYS Kothari-CNP    dextrose 10 % in water (D10W) infusion, 0.3 g/kg/hr, intravenous, Once PRN, Mauricio Estrada MD    dextrose 50 % injection 25 g, 25 g, intravenous, q15 min PRN, Mauricio Estrada MD, 25 g at 11/13/23 0850    epoetin tyson-epbx (Retacrit) injection 8,000 Units, 8,000 Units, subcutaneous, Once per day on Mon Wed  Fri, Zeeshan Gonzalez MD    glucagon (Glucagen) injection 1 mg, 1 mg, intramuscular, q15 min PRN, Mauricio Estrada MD    insulin glargine (Lantus) injection 10 Units, 10 Units, subcutaneous, Nightly, Mauricio Estrada MD, 10 Units at 11/12/23 2210    insulin lispro (HumaLOG) injection 0-15 Units, 0-15 Units, subcutaneous, TID with meals, Mauricio Estrada MD, 12 Units at 11/12/23 1711    melatonin tablet 3 mg, 3 mg, oral, Nightly PRN, AMARILYS Kothari-CNP    ondansetron (Zofran) tablet 4 mg, 4 mg, oral, q8h PRN **OR** ondansetron (Zofran) injection 4 mg, 4 mg, intravenous, q8h PRN, AMARILYS Kothari-CNP    pantoprazole (ProtoNix) EC tablet 40 mg, 40 mg, oral, Daily before breakfast, OSBALDO Kothari, 40 mg at 11/13/23 0550    piperacillin-tazobactam-dextrose (Zosyn) IV 2.25 g, 2.25 g, intravenous, q8h, Jaison MottaD, Last Rate: 100 mL/hr at 11/13/23 1250, 2.25 g at 11/13/23 1250    sevelamer carbonate (Renvela) tablet 1,600 mg, 1,600 mg, oral, TID with meals, Mauricio Estrada MD, 1,600 mg at 11/13/23 1249    simethicone (Mylicon) chewable tablet 80 mg, 80 mg, oral, q8h PRN, AMARILYS Kothari-CNP    vancomycin (Vancocin) placeholder, , miscellaneous, Daily PRN, Deep Rich, PharmD    vancomycin in dextrose 5 % (Vancocin) IVPB 1,000 mg, 1,000 mg, intravenous, Once, Navjot Manley, PharmD    Review of Systems  History obtained from the patient  General ROS: positive for  - chills and fever  Respiratory ROS: no cough, shortness of breath, or wheezing  Cardiovascular ROS: no chest pain or dyspnea on exertion  Gastrointestinal ROS: no abdominal pain, change in bowel habits, or black or bloody stools  Genitourinary ROS: no dysuria, trouble voiding, or hematuria  Pain to LLE stump     Last Recorded Vitals  /73   Pulse 95   Temp 36.1 °C (97 °F) (Temporal)   Resp 18   Wt 79.4 kg (175 lb 0.7 oz)   SpO2 94%      Physical Exam  Orientation: oriented to person,  "place, time, and general circumstances  HEENT: normocephalic, atraumatic  Pulm: clear to auscultation bilaterally, no wheezes, good air entry  Cardiac: Regular rate and rhythm or without murmur or extra heart sounds  GI: soft, nontender, normal bowel sounds  Pulses: Palpable to RLE.     Relevant Results    LABS:  Lab Results   Component Value Date    WBC 9.4 11/13/2023    HGB 8.9 (L) 11/13/2023    HCT 28.2 (L) 11/13/2023    MCV 85 11/13/2023     11/13/2023      Results from last 72 hours   Lab Units 11/13/23  0606   SODIUM mmol/L 131*   POTASSIUM mmol/L 4.1   CHLORIDE mmol/L 93*   CO2 mmol/L 24   BUN mg/dL 42*   CREATININE mg/dL 6.76*   GLUCOSE mg/dL 86   CALCIUM mg/dL 8.3*   ANION GAP mmol/L 18   EGFR mL/min/1.73m*2 10*     Results from last 72 hours   Lab Units 11/13/23  0606   ALBUMIN g/dL 2.6*           No lab exists for component: \"PT\"    MICRO:  Susceptibility data from last 14 days.  Collected Specimen Info Organism Ceftriaxone Clindamycin Erythromycin Levofloxacin Oxacillin Penicillin Tetracycline Trimethoprim/Sulfamethoxazole Vancomycin   11/11/23 Blood culture from Central Line/Catheter (Specify below) Methicillin Resistant Staphylococcus aureus (MRSA)            11/10/23 Blood culture from Peripheral Venipuncture Methicillin Resistant Staphylococcus aureus (MRSA)            11/10/23 Blood culture from Peripheral Venipuncture Methicillin Resistant Staphylococcus aureus (MRSA)  R R  R  R S S     Streptococcus dysgalactiae/canis S   S  S          IMAGING:   XR chest 1 view   Final Result   Linear scarring and/or discoid atelectasis with some blunting of the   left lateral costophrenic angle remain.  On today's exam there is also   questionable very tiny right pleural effusion.  The lungs otherwise   are clear and there is no pneumothorax..   Signed by Daniel Shay MD      CT femur left wo IV contrast   Final Result   THIS STUDY WAS PERFORMED WITHOUT IV CONTRAST.  THERE IS A 6.3 X 4.6 X   2.7 CM " FLUID COLLECTION WHICH EXTENDS FROM THE AMPUTATION MARGIN TO   THE UNDERLYING SKIN.  INFECTED FLUID IS NOT EXCLUDED.  WITHOUT IV   CONTRAST, I COULD NOT EXCLUDE ABSCESS.  THERE IS HOWEVER NO EVIDENCE   OF SOFT TISSUE GAS.   THERE IS BENIGN PERIOSTEAL NEW BONE FORMATION SEEN ABOUT THE RESECTION   MARGIN WHICH MIGHT SIMPLY BE RELATED TO THE AMPUTATION ITSELF.   ___    Signed by Nakul Kc MD      Transthoracic Echo (TTE) Complete    (Results Pending)   IR CVC exchange    (Results Pending)        Assessment/Plan     38 year old male with ESRD, HD through right fem HD line. Admitted with left AKA stump infection and bacteremia/CLABSI. Reported history of SVC stenosis, patient fem dependent for access. Decision was made for line exchange to right fem.     Risks were discussed including but not limited to pain at insertion site, infection, bleeding and hematoma, and air embolism. Benefits of the procedure and alternatives to treatment were also reviewed. Patient verbalizes understanding and wishes to proceed. They will further discuss with IR attending prior to procedure.     Plan for right fem HD line exchange today.     Alix Gonzáles, APRN-CNP          Time : Billing Time  Prep time on date of the patient encounter: 10 minutes.   Time spent directly with patient/family/caregiver: 10 minutes.   Documentation time: 10 minutes.   Other time spent: 0 minutes.  Details of Other Time:  0  Total time (minutes):  30 minutes  Time Spent with this Patient (minutes).  More than 50% of This Time was Spent in Counseling and/or Coordination of Care

## 2023-11-13 NOTE — PROGRESS NOTES
"  INFECTIOUS DISEASE DAILY PROGRESS NOTE    SUBJECTIVE:    No overnight events. Having HD today. No fevers. Discussed line exchange which he is agreeable to.    OBJECTIVE:  VITALS (Last 24 Hours)  /78   Pulse 87   Temp 36.4 °C (97.5 °F) (Temporal)   Resp 17   Ht 1.753 m (5' 9.02\")   Wt 79.4 kg (175 lb 0.7 oz)   SpO2 96%   BMI 25.84 kg/m²     PHYSICAL EXAM:  Gen - NAD  Heart - RRR  Abd - soft, no ttp  RUE - s/p hand amputation  LLE - s/p AKA with wound vac on stump, doesn't feel warm  RLE - fem HD cath present  Skin - no rash    ABX: IV Vanc/Zosyn    LABS:  Lab Results   Component Value Date    WBC 9.4 11/13/2023    HGB 8.9 (L) 11/13/2023    HCT 28.2 (L) 11/13/2023    MCV 85 11/13/2023     11/13/2023     Lab Results   Component Value Date    GLUCOSE 86 11/13/2023    CALCIUM 8.3 (L) 11/13/2023     (L) 11/13/2023    K 4.1 11/13/2023    CO2 24 11/13/2023    CL 93 (L) 11/13/2023    BUN 42 (H) 11/13/2023    CREATININE 6.76 (H) 11/13/2023     Results from last 72 hours   Lab Units 11/13/23  0606 11/11/23  0631 11/10/23  1207   ALK PHOS U/L  --   --  401*   BILIRUBIN TOTAL mg/dL  --   --  0.7   PROTEIN TOTAL g/dL  --   --  7.1   ALT U/L  --   --  10   AST U/L  --   --  10   ALBUMIN g/dL 2.6*   < > 3.1*    < > = values in this interval not displayed.     Estimated Creatinine Clearance: 14.7 mL/min (A) (by C-G formula based on SCr of 6.76 mg/dL (H)).      ASSESSMENT/PLAN:    CLABSI (right fem HD cath POA) due to Staph Aureus and Strep dysgalactiae/canis - acute life threatening condition needing IV abx  LLE AKA Stump Infection with Abscess - would suspect this is an abscess given bacteremia, source may be this and the line secondarily seeded  ESRD on HD    IV Vancomycin and IV Zosyn still. IR consultation ordered for line exchange right fem HD cath. Will repeat blood cx x2 tomorrow. TTE is done and pending read for endocarditis evaluation. Needs assessment for drainage of the left AKA stump fluid for " suspected abscess.    Monitoring for adverse effects of abx such as rash/itching/diarrhea.    Will follow. Thanks!    Mat Jauregui MD  [facility]  Office #[phone]

## 2023-11-13 NOTE — PROGRESS NOTES
11/13/23 1219   Discharge Planning   Living Arrangements Other (Comment)  ([facility])   Support Systems Family members   Assistance Needed Patient is care dependent at LT Facility   Type of Residence Nursing home/residential care   Home or Post Acute Services Other (Comment)  (Patient from LTC Facility)   Type of Post Acute Facility Services Other (Comment)  (Long term Care Facility)   Patient expects to be discharged to: [facility] Facility   Does the patient need discharge transport arranged? Yes   RoundTrip coordination needed? Yes     11/13/2023 1223 Patient not medically ready for discharge. Patient admitted for ESRD/ Cellulitis and abscess of left leg.  Chart reviewed per T[facility]. Patient is resident at [facility]  [phone] [facility]. Patient is active hemodialysis at Facility.     PMH: Left AKA with complications 07/05/2023.  Left AKA stump I & D  07/25/2023.  Patient was recently admitted to [facility] 09/14/2023 for altered mentation and hyperkalemia.   Anticipate return to [facility]. TCC following.   Neisha MORRIS RN [facility] CCM

## 2023-11-13 NOTE — PROGRESS NOTES
"Vancomycin Dosing by Pharmacy- FOLLOW UP    Edwar Hemphill is a 38 y.o. year old male who Pharmacy has been consulted for vancomycin dosing for cellulitis, skin and soft tissue. Based on the patient's indication and renal status this patient is being dosed based on a goal pre-HD level of 15-25.     Renal function is currently stable.    Current vancomycin dose: dose per HD leve    Most recent trough level: 17.8 mcg/mL    Visit Vitals  /78   Pulse 84   Temp 36.4 °C (97.5 °F) (Temporal)   Resp 17        Lab Results   Component Value Date    CREATININE 6.76 (H) 11/13/2023    CREATININE 5.81 (H) 11/12/2023    CREATININE 8.24 (H) 11/11/2023    CREATININE 7.92 (H) 11/10/2023        Patient weight is No results found for: \"PTWEIGHT\"    No results found for: \"CULTURE\"     I/O last 3 completed shifts:  In: 410 (5.2 mL/kg) [P.O.:360; IV Piggyback:50]  Out: - (0 mL/kg)   Weight: 79.4 kg   [unfilled]    Lab Results   Component Value Date    PATIENTTEMP 37.0 07/09/2023    PATIENTTEMP 37.0 07/08/2023    PATIENTTEMP 37.0 07/08/2023        Assessment/Plan    Pre-HD level is below goal, will order 1g x1 after dialysis today    This dosing regimen is predicted by [employer] to result in the following pharmacokinetic parameters:      The next level will be obtained on 11/15 at am lab. May be obtained sooner if clinically indicated.   Will continue to monitor renal function daily while on vancomycin and order serum creatinine at least every 48 hours if not already ordered.  Follow for continued vancomycin needs, clinical response, and signs/symptoms of toxicity.       Navjot Manley, PharmD           "

## 2023-11-13 NOTE — CARE PLAN
Problem: Skin  Goal: Decreased wound size/increased tissue granulation at next dressing change  11/13/2023 0342 by Molly Feldman RN  Outcome: Progressing  Flowsheets (Taken 11/13/2023 0342)  Decreased wound size/increased tissue granulation at next dressing change: Promote sleep for wound healing  11/13/2023 0342 by Molly Feldman RN  Outcome: Progressing  Flowsheets (Taken 11/13/2023 0342)  Decreased wound size/increased tissue granulation at next dressing change: Promote sleep for wound healing  11/13/2023 0341 by Molly Feldman RN  Outcome: Progressing  Goal: Participates in plan/prevention/treatment measures  11/13/2023 0342 by Molly Feldman RN  Flowsheets (Taken 11/13/2023 0342)  Participates in plan/prevention/treatment measures: Elevate heels  11/13/2023 0342 by Molly Feldman RN  Outcome: Progressing  Flowsheets (Taken 11/13/2023 0342)  Participates in plan/prevention/treatment measures: Elevate heels  11/13/2023 0341 by Molly Feldman RN  Outcome: Progressing  Goal: Prevent/manage excess moisture  11/13/2023 0342 by Molly Feldman RN  Flowsheets (Taken 11/13/2023 0342)  Prevent/manage excess moisture: Cleanse incontinence/protect with barrier cream  11/13/2023 0342 by Molly Feldman RN  Outcome: Progressing  Flowsheets (Taken 11/13/2023 0342)  Prevent/manage excess moisture: Cleanse incontinence/protect with barrier cream  11/13/2023 0341 by Molly Feldman RN  Outcome: Progressing  Goal: Prevent/minimize sheer/friction injuries  11/13/2023 0342 by Molly Feldman RN  Flowsheets (Taken 11/13/2023 0342)  Prevent/minimize sheer/friction injuries: Turn/reposition every 2 hours/use positioning/transfer devices  11/13/2023 0342 by Molly Feldman RN  Outcome: Progressing  Flowsheets (Taken 11/13/2023 0342)  Prevent/minimize sheer/friction injuries: Turn/reposition every 2 hours/use positioning/transfer devices  11/13/2023 0341 by Molly Feldman RN  Outcome: Progressing  Goal: Promote/optimize  nutrition  11/13/2023 0342 by Molly Feldman RN  Flowsheets (Taken 11/13/2023 0342)  Promote/optimize nutrition: Monitor/record intake including meals  11/13/2023 0342 by Molly Feldman RN  Outcome: Progressing  Flowsheets (Taken 11/13/2023 0342)  Promote/optimize nutrition: Monitor/record intake including meals  11/13/2023 0341 by Molly Feldman RN  Outcome: Progressing  Goal: Promote skin healing  11/13/2023 0342 by Molly Feldman RN  Flowsheets (Taken 11/13/2023 0342)  Promote skin healing: Turn/reposition every 2 hours/use positioning/transfer devices  11/13/2023 0342 by Molly Feldman RN  Outcome: Progressing  Flowsheets (Taken 11/13/2023 0342)  Promote skin healing: Turn/reposition every 2 hours/use positioning/transfer devices  11/13/2023 0341 by Molly Feldman RN  Outcome: Progressing

## 2023-11-14 ENCOUNTER — APPOINTMENT (OUTPATIENT)
Dept: CARDIOLOGY | Facility: HOSPITAL | Age: 38
DRG: 314 | End: 2023-11-14
Payer: MEDICARE

## 2023-11-14 LAB
ALBUMIN SERPL BCP-MCNC: 2.3 G/DL (ref 3.4–5)
ANION GAP SERPL CALC-SCNC: 15 MMOL/L (ref 10–20)
AORTIC VALVE MEAN GRADIENT: 5
AORTIC VALVE PEAK VELOCITY: 1.53
AV PEAK GRADIENT: 9.4
AVA (PEAK VEL): 2.11
AVA (VTI): 2.45
BACTERIA BLD AEROBE CULT: ABNORMAL
BACTERIA BLD AEROBE CULT: ABNORMAL
BACTERIA BLD CULT: ABNORMAL
BACTERIA BLD CULT: ABNORMAL
BUN SERPL-MCNC: 28 MG/DL (ref 6–23)
CALCIUM SERPL-MCNC: 8.2 MG/DL (ref 8.6–10.3)
CHLORIDE SERPL-SCNC: 98 MMOL/L (ref 98–107)
CO2 SERPL-SCNC: 25 MMOL/L (ref 21–32)
CREAT SERPL-MCNC: 5.12 MG/DL (ref 0.5–1.3)
EJECTION FRACTION APICAL 4 CHAMBER: 54.1
EJECTION FRACTION: 54
ERYTHROCYTE [DISTWIDTH] IN BLOOD BY AUTOMATED COUNT: 18.4 % (ref 11.5–14.5)
GFR SERPL CREATININE-BSD FRML MDRD: 14 ML/MIN/1.73M*2
GLUCOSE BLD MANUAL STRIP-MCNC: 138 MG/DL (ref 74–99)
GLUCOSE BLD MANUAL STRIP-MCNC: 173 MG/DL (ref 74–99)
GLUCOSE BLD MANUAL STRIP-MCNC: 231 MG/DL (ref 74–99)
GLUCOSE BLD MANUAL STRIP-MCNC: 91 MG/DL (ref 74–99)
GLUCOSE SERPL-MCNC: 78 MG/DL (ref 74–99)
GRAM STN SPEC: ABNORMAL
GRAM STN SPEC: ABNORMAL
HCT VFR BLD AUTO: 30.7 % (ref 41–52)
HGB BLD-MCNC: 9.4 G/DL (ref 13.5–17.5)
LEFT ATRIUM VOLUME AREA LENGTH INDEX BSA: 20.8
LEFT VENTRICLE INTERNAL DIMENSION DIASTOLE: 3.9 (ref 3.5–6)
LEFT VENTRICULAR OUTFLOW TRACT DIAMETER: 2.1
MCH RBC QN AUTO: 26.6 PG (ref 26–34)
MCHC RBC AUTO-ENTMCNC: 30.6 G/DL (ref 32–36)
MCV RBC AUTO: 87 FL (ref 80–100)
MITRAL VALVE E/A RATIO: 0.65
MITRAL VALVE E/E' RATIO: 7.48
NRBC BLD-RTO: 0 /100 WBCS (ref 0–0)
PHOSPHATE SERPL-MCNC: 4.3 MG/DL (ref 2.5–4.9)
PLATELET # BLD AUTO: 280 X10*3/UL (ref 150–450)
POTASSIUM SERPL-SCNC: 4.2 MMOL/L (ref 3.5–5.3)
RBC # BLD AUTO: 3.54 X10*6/UL (ref 4.5–5.9)
RIGHT VENTRICLE PEAK SYSTOLIC PRESSURE: 22.2
SODIUM SERPL-SCNC: 134 MMOL/L (ref 136–145)
TRICUSPID ANNULAR PLANE SYSTOLIC EXCURSION: 1.8
WBC # BLD AUTO: 6.6 X10*3/UL (ref 4.4–11.3)

## 2023-11-14 PROCEDURE — 2500000004 HC RX 250 GENERAL PHARMACY W/ HCPCS (ALT 636 FOR OP/ED): Performed by: PHARMACIST

## 2023-11-14 PROCEDURE — 2500000001 HC RX 250 WO HCPCS SELF ADMINISTERED DRUGS (ALT 637 FOR MEDICARE OP): Performed by: SPECIALIST

## 2023-11-14 PROCEDURE — 82947 ASSAY GLUCOSE BLOOD QUANT: CPT

## 2023-11-14 PROCEDURE — 93306 TTE W/DOPPLER COMPLETE: CPT

## 2023-11-14 PROCEDURE — 80069 RENAL FUNCTION PANEL: CPT | Performed by: PHYSICIAN ASSISTANT

## 2023-11-14 PROCEDURE — 36415 COLL VENOUS BLD VENIPUNCTURE: CPT | Performed by: PHYSICIAN ASSISTANT

## 2023-11-14 PROCEDURE — 2500000004 HC RX 250 GENERAL PHARMACY W/ HCPCS (ALT 636 FOR OP/ED): Performed by: PHYSICIAN ASSISTANT

## 2023-11-14 PROCEDURE — 85027 COMPLETE CBC AUTOMATED: CPT | Performed by: PHYSICIAN ASSISTANT

## 2023-11-14 PROCEDURE — 2500000002 HC RX 250 W HCPCS SELF ADMINISTERED DRUGS (ALT 637 FOR MEDICARE OP, ALT 636 FOR OP/ED): Performed by: PHYSICIAN ASSISTANT

## 2023-11-14 PROCEDURE — 87040 BLOOD CULTURE FOR BACTERIA: CPT | Mod: AHULAB | Performed by: PHYSICIAN ASSISTANT

## 2023-11-14 PROCEDURE — 1100000001 HC PRIVATE ROOM DAILY

## 2023-11-14 PROCEDURE — 93306 TTE W/DOPPLER COMPLETE: CPT | Performed by: STUDENT IN AN ORGANIZED HEALTH CARE EDUCATION/TRAINING PROGRAM

## 2023-11-14 RX ORDER — HYDROMORPHONE HYDROCHLORIDE 2 MG/1
2 TABLET ORAL EVERY 4 HOURS PRN
Status: DISCONTINUED | OUTPATIENT
Start: 2023-11-14 | End: 2023-11-16

## 2023-11-14 RX ORDER — ACETAMINOPHEN 325 MG/1
650 TABLET ORAL EVERY 4 HOURS PRN
Status: DISCONTINUED | OUTPATIENT
Start: 2023-11-14 | End: 2023-11-17 | Stop reason: HOSPADM

## 2023-11-14 RX ORDER — HYDROMORPHONE HYDROCHLORIDE 2 MG/1
1 TABLET ORAL EVERY 4 HOURS PRN
Status: DISCONTINUED | OUTPATIENT
Start: 2023-11-14 | End: 2023-11-17 | Stop reason: HOSPADM

## 2023-11-14 RX ADMIN — PANTOPRAZOLE SODIUM 40 MG: 40 TABLET, DELAYED RELEASE ORAL at 06:13

## 2023-11-14 RX ADMIN — PIPERACILLIN SODIUM AND TAZOBACTAM SODIUM 2.25 G: 2; .25 INJECTION, SOLUTION INTRAVENOUS at 12:52

## 2023-11-14 RX ADMIN — SEVELAMER CARBONATE 1600 MG: 800 TABLET, FILM COATED ORAL at 12:52

## 2023-11-14 RX ADMIN — PIPERACILLIN SODIUM AND TAZOBACTAM SODIUM 2.25 G: 2; .25 INJECTION, SOLUTION INTRAVENOUS at 21:54

## 2023-11-14 RX ADMIN — PIPERACILLIN SODIUM AND TAZOBACTAM SODIUM 2.25 G: 2; .25 INJECTION, SOLUTION INTRAVENOUS at 04:30

## 2023-11-14 RX ADMIN — SEVELAMER CARBONATE 1600 MG: 800 TABLET, FILM COATED ORAL at 09:29

## 2023-11-14 RX ADMIN — HYDROMORPHONE HYDROCHLORIDE 2 MG: 2 TABLET ORAL at 21:55

## 2023-11-14 RX ADMIN — PERFLUTREN 0.5 ML OF DILUTION: 6.52 INJECTION, SUSPENSION INTRAVENOUS at 10:57

## 2023-11-14 RX ADMIN — SEVELAMER CARBONATE 1600 MG: 800 TABLET, FILM COATED ORAL at 18:06

## 2023-11-14 RX ADMIN — INSULIN GLARGINE 10 UNITS: 100 INJECTION, SOLUTION SUBCUTANEOUS at 21:54

## 2023-11-14 RX ADMIN — INSULIN LISPRO 3 UNITS: 100 INJECTION, SOLUTION INTRAVENOUS; SUBCUTANEOUS at 18:06

## 2023-11-14 ASSESSMENT — PAIN SCALES - GENERAL: PAINLEVEL_OUTOF10: 0 - NO PAIN

## 2023-11-14 NOTE — CONSULTS
Wound Care Consult     Visit Date: 11/14/2023      Patient Name: Edwar Hemphill         MRN: [id number]           YOB: 1985     Reason for Consult: Assessment complete. Co-visit with Monica Beckford RN. Additional supplies left at bedside.           Pertinent Labs:   Albumin   Date Value Ref Range Status   11/14/2023 2.3 (L) 3.4 - 5.0 g/dL Final       Wound Assessment:  Wound Skin Tear Buttocks (Active)   Wound Image   11/14/23 1502   Site Assessment Excoriated;Red 11/13/23 2200   Aracelis-Wound Assessment Excoriated;Pink 11/13/23 2200   Non-staged Wound Description Not applicable 11/13/23 2200   Drainage Description None 11/13/23 2200   Drainage Amount None 11/13/23 2200   Dressing Moisture barrier 11/13/23 2200       Wound 11/10/23 Other (comment) Leg Left;Anterior (Active)   Wound Image   11/14/23 1511   Non-staged Wound Description Full thickness 11/14/23 1511   Shape Linear 11/14/23 1511   Wound Length (cm) 2 cm 11/14/23 1511   Wound Width (cm) 9.5 cm 11/14/23 1511   Wound Surface Area (cm^2) 19 cm^2 11/14/23 1511   Wound Depth (cm) 0.4 cm 11/14/23 1511   Wound Volume (cm^3) 7.6 cm^3 11/14/23 1511   State of Healing Fully granulated 11/14/23 1511       Wound 11/14/23 Other (comment) Heel Right (Active)   Wound Image   11/14/23 1520   Site Assessment Sloughing;Eschar;Necrotic 11/14/23 1520   Aracelis-Wound Assessment Dry;Other (Comment) 11/14/23 1520   Non-staged Wound Description Full thickness 11/14/23 1520   Shape Circular 11/14/23 1520   Wound Length (cm) 3 cm 11/14/23 1520   Wound Width (cm) 2.8 cm 11/14/23 1520   Wound Surface Area (cm^2) 8.4 cm^2 11/14/23 1520   Wound Depth (cm) 0.3 cm 11/14/23 1520   Wound Volume (cm^3) 2.52 cm^3 11/14/23 1520   State of Healing Non-healing 11/14/23 1520   Wound Bed Granulation (%) 0 % 11/14/23 1520   Wound Bed Slough (%) 10 % 11/14/23 1520   Wound Bed Eschar (%) 80 % 11/14/23 1520   Margins Not attached 11/14/23 1520   Drainage Description Tan 11/14/23 1520    Drainage Amount Small 11/14/23 1520   Dressing Changed Changed 11/14/23 1520   Dressing Status Clean;Dry 11/14/23 1520       Wound Team Summary Assessment: Wound care recommendations for Left BKA. Irrigate with normal saline or wound cleanser, Pat dry. Continue with NPWT     Right heel- Irrigate with normal saline or wound cleanser, Pat dry. Apply no sting skin barrier (cavilon) to gabriela wound   Pack with Aquacel Ag (Silver) Cover with ABD, Kerlix and paper tape. Change everyday and as needed.  Please wear TruVue boots while in bed.     Buttocks-MASD/IAD ( Irritant contact dermatitis due to friciton or contact with body fluids, unspecified)    Cleanse with bath wipes or soap and water. Rinse well and pat dry.  Apply Triad hydrophilic wound dressing ointment three times a day and as needed.         Wound Vac applied to Left BKA  (1)  Mepitel   (1)  BLACK foam         Pressure; -125MMGH     Plan:  Change wound Vac Change two times a week and as needed     Consider medicating the patient 30 - 60 minutes prior to dressing change. If there is jac blood in the tubing (active bleeding), stop the suction and call physician. If the suction is off for greater than 2 hours, remove foam dressing and replace with moist saline dressing. Change canister every week or when full. Remove foam dressing and replace with saline dressing for transfer or discharge.    If patient is discharged prior to next dressing change, please disconnect VAC machine, remove foam dressing apply moist saline dressing. Then place machine in the front soiled utility room on the unit.    Wound Team Plan: While in bed patient should only be on one fitted sheet, and one chux. Please, do not use brief while patient is resting in bed. Elevate heels off the bed surface at all times. Turn and reposition at least every 2 hours.     Thank you for this consultations, while inpatient please contact with any questions or changes in condition.        Rosaline Schwab RN  BSN,WCC,CWN  [phone]   11/14/2023  5:45 PM

## 2023-11-14 NOTE — PROGRESS NOTES
11/14/2023 1544  TCC call to [facility]( [phone]) 2nd attempt x 2 days.  Spoke with Nursing who confirms patient is care dependent. Abraham lift into electric wheelchair.     Longterm resident. Updates provided. PPD  #1 s/p IR Right groin tunneled HD catheter placement. ID following CLABSI. Discharge POC to be determined. Cultures pending. LLE with cellulitis , suspected abscess.  Neisha MORRIS RN TCC CCM

## 2023-11-14 NOTE — PROGRESS NOTES
"  INFECTIOUS DISEASE DAILY PROGRESS NOTE    SUBJECTIVE:    No new events. HD cath was exchanged. No more fevers. Feels better overall.    OBJECTIVE:  VITALS (Last 24 Hours)  /87   Pulse 93   Temp 36.1 °C (96.9 °F) (Oral)   Resp 18   Ht 1.753 m (5' 9.02\")   Wt 79.4 kg (175 lb 0.7 oz)   SpO2 96%   BMI 25.84 kg/m²     PHYSICAL EXAM:  Gen - NAD  RUE - s/p hand amputation  LLE - s/p AKA with wound vac on stump, doesn't feel warm  RLE - fem HD cath present  Skin - no rash     ABX: IV Vanc/Zosyn    LABS:  Lab Results   Component Value Date    WBC 6.6 11/14/2023    HGB 9.4 (L) 11/14/2023    HCT 30.7 (L) 11/14/2023    MCV 87 11/14/2023     11/14/2023     Lab Results   Component Value Date    GLUCOSE 78 11/14/2023    CALCIUM 8.2 (L) 11/14/2023     (L) 11/14/2023    K 4.2 11/14/2023    CO2 25 11/14/2023    CL 98 11/14/2023    BUN 28 (H) 11/14/2023    CREATININE 5.12 (H) 11/14/2023     Results from last 72 hours   Lab Units 11/14/23  0634   ALBUMIN g/dL 2.3*     Estimated Creatinine Clearance: 19.4 mL/min (A) (by C-G formula based on SCr of 5.12 mg/dL (H)).    IMAGING:  TTE 11/14  CONCLUSIONS:   1. Left ventricular systolic function is normal with a 55% estimated ejection fraction.   2. Spectral Doppler shows an impaired relaxation pattern of left ventricular diastolic filling.   3. No evidence of vegetations present.   4. RVSP within normal limits.   5. Poorly visualized anatomical structures due to suboptimal image quality.   6. Compared with study from 6/22/2023, again echocardiographic views are limited. There is no significant difference in valvular anatomy and function to suggest active endocarditis.    ASSESSMENT/PLAN:     CLABSI (right fem HD cath POA) due to MRSA and Strep dysgalactiae/canis - acute life threatening condition needing IV abx. HD cath exchanged over wire 11/13. Repeat blood cx 11/14 pending. TTE is without signs of endocarditis.  LLE AKA Stump Infection with Abscess - would " suspect this is an abscess given bacteremia, source may be this and the line secondarily seeded  ESRD on HD     IV Vancomycin/Zosyn still. Surgery needs to assess the fluid collection in the left AKA stump.     Monitoring for adverse effects of abx such as rash/itching/diarrhea.     Will follow. Thanks!    Mat Jauregui MD  [facility]  Office #[phone]

## 2023-11-14 NOTE — CONSULTS
Reason For Consult  ESRD on hemodialysis     History Of Present Illness  Edwar Hemphill is a 38 y.o. male with a past medical history of end-stage renal disease on hemodialysis via right femoral TDC who resides at [facility]. 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  He later had stump debridement on 7/27 with wound VAC placement.  He had polymicrobial infection including resistant E coli, Acenetobacter baumannii and Enterobacter fecalis. He was admitted in September with positive blood cultures growing Acenetobacter baumannii and Stenotrophomonas. He went to IR for dialysis line exchange.  He comes in now due to not feeling well. CT imaging of the left limp noted a 6.3 cm fluid collection but no evidence of soft tissue gas. He was started on antimicrobial coverage. Nephrology was consulted for renal care.  Dialysis yesterday without event.     Past Medical History:   Diagnosis Date    Essential (primary) hypertension 05/26/2017    HTN (hypertension), benign    Personal history of other endocrine, nutritional and metabolic disease 05/26/2017    History of diabetes mellitus       Past Surgical History:   Procedure Laterality Date    CT AORTA AND BILATERAL ILIOFEMORAL RUNOFF ANGIOGRAM W AND/OR WO IV CONTRAST  2/9/2023    CT AORTA AND BILATERAL ILIOFEMORAL RUNOFF ANGIOGRAM W AND/OR WO IV CONTRAST 2/9/2023 DOCTOR OFFICE [facility]       Social History     Tobacco Use    Smoking status: Every Day     Packs/day: 0.25     Years: 15.00     Additional pack years: 0.00     Total pack years: 3.75     Types: Cigarettes    Smokeless tobacco: Never        Family History  Family History   Problem Relation  Name Age of Onset    Other (DIABETES DUE TO UNDERLYING CONDITION WITH MICROALBUMINURIA) Father      Other (DIABETES DUE TO UNDERLYING CONDITION WITH MICROALBUMINURIA [Other]) Other AUNT     Other (DIABETES DUE TO UNDERLYING CONDITION WITH MICROALBUMINURIA [Other]) Other GP         Allergies  Patient has no known allergies.    Review of Systems   10 point review of systems obtained and negative unless stated in HPI:     Physical Exam   Constitutional: Well developed, awake/alert/oriented  x3   Eyes: Periorbital swelling   ENMT: mucous membranes moist   Head/Neck: Facial edema  Difficult to assess JVD   Respiratory/Thorax: Diminished bibasilar breath sounds,  no wheezing, rales or rhonchi   Cardiovascular: regular rhythm, normal  S1 and S2   Gastrointestinal: Nondistended, soft, non-tender,  no rebound tenderness or guarding   Genitourinary: No Castanon   Extremities: Pitting right leg edema  L AKA   RUE amputation  RLE HD cathter   Neurological: No asterixis   Psychological: Appropriate mood and behavior   Skin: L AKA stump  Right femoral TDC           I&O 24HR    Intake/Output Summary (Last 24 hours) at 11/14/2023 0955  Last data filed at 11/13/2023 2229  Gross per 24 hour   Intake 3140 ml   Output 405 ml   Net 2735 ml         Vitals 24HR  Heart Rate:  [89-99]   Temp:  [36.1 °C (96.9 °F)-36.9 °C (98.4 °F)]   Resp:  [17-18]   BP: (117-137)/(73-87)   SpO2:  [94 %-98 %]     Scheduled Medications  epoetin tyson or biosimilar, 8,000 Units, subcutaneous, Once per day on Mon Wed Fri  insulin glargine, 10 Units, subcutaneous, Nightly  insulin lispro, 0-15 Units, subcutaneous, TID with meals  pantoprazole, 40 mg, oral, Daily before breakfast  perflutren lipid microspheres, 0.5 mL of dilution, intravenous, Once in imaging  perflutren lipid microspheres, 0.5-10 mL of dilution, intravenous, Once in imaging  piperacillin-tazobactam, 2.25 g, intravenous, q8h  sevelamer carbonate, 1,600 mg, oral, TID with meals  sulfur hexafluoride  microsphr, 2 mL, intravenous, Once in imaging      Continuous medications       PRN medications: acetaminophen **OR** [DISCONTINUED] acetaminophen **OR** [DISCONTINUED] acetaminophen, dextrose 10 % in water (D10W), dextrose, fentaNYL PF, glucagon, heparin, lidocaine PF, melatonin, ondansetron **OR** ondansetron, simethicone, vancomycin     Relevant Results  Results from last 7 days   Lab Units 11/14/23  0634 11/13/23  0606 11/12/23  0728 11/11/23  0631 11/10/23  1207   WBC AUTO x10*3/uL 6.6 9.4 11.2   < > 19.7*   HEMOGLOBIN g/dL 9.4* 8.9* 9.1*   < > 10.2*   HEMATOCRIT % 30.7* 28.2* 29.4*   < > 32.7*   PLATELETS AUTO x10*3/uL 280 307 312   < > 370   NEUTROS PCT AUTO %  --   --   --   --  91.4   LYMPHS PCT AUTO %  --   --   --   --  2.2   MONOS PCT AUTO %  --   --   --   --  3.9   EOS PCT AUTO %  --   --   --   --  1.4    < > = values in this interval not displayed.        Results from last 7 days   Lab Units 11/14/23  0634 11/13/23  0606 11/12/23  0728 11/11/23  0631 11/10/23  1207   SODIUM mmol/L 134* 131* 130*   < > 133*   POTASSIUM mmol/L 4.2 4.1 4.5   < > 5.3   CHLORIDE mmol/L 98 93* 91*   < > 92*   CO2 mmol/L 25 24 20*   < > 24   BUN mg/dL 28* 42* 35*   < > 47*   CREATININE mg/dL 5.12* 6.76* 5.81*   < > 7.92*   CALCIUM mg/dL 8.2* 8.3* 8.7   < > 8.8   PROTEIN TOTAL g/dL  --   --   --   --  7.1   BILIRUBIN TOTAL mg/dL  --   --   --   --  0.7   ALK PHOS U/L  --   --   --   --  401*   ALT U/L  --   --   --   --  10   AST U/L  --   --   --   --  10   GLUCOSE mg/dL 78 86 273*   < > 131*    < > = values in this interval not displayed.        XR chest 1 view   Final Result   Linear scarring and/or discoid atelectasis with some blunting of the   left lateral costophrenic angle remain.  On today's exam there is also   questionable very tiny right pleural effusion.  The lungs otherwise   are clear and there is no pneumothorax..   Signed by aDniel Shay MD      CT femur left wo IV contrast   Final Result   THIS STUDY WAS  PERFORMED WITHOUT IV CONTRAST.  THERE IS A 6.3 X 4.6 X   2.7 CM FLUID COLLECTION WHICH EXTENDS FROM THE AMPUTATION MARGIN TO   THE UNDERLYING SKIN.  INFECTED FLUID IS NOT EXCLUDED.  WITHOUT IV   CONTRAST, I COULD NOT EXCLUDE ABSCESS.  THERE IS HOWEVER NO EVIDENCE   OF SOFT TISSUE GAS.   THERE IS BENIGN PERIOSTEAL NEW BONE FORMATION SEEN ABOUT THE RESECTION   MARGIN WHICH MIGHT SIMPLY BE RELATED TO THE AMPUTATION ITSELF.   ___    Signed by Nakul Kc MD      Transthoracic Echo (TTE) Complete    (Results Pending)   IR CVC exchange    (Results Pending)         Assessment/Plan     Edwar Hemphill is a 38 y.o. male with a past medical history of end-stage renal disease on hemodialysis via right femoral TDC who resides at [facility]. 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  He later had stump debridement on 7/27 with wound VAC placement.  He had polymicrobial infection including resistant E coli, Acenetobacter baumannii and Enterobacter fecalis. He was admitted in September with positive blood cultures growing Acenetobacter baumannii and Stenotrophomonas. He went to IR for dialysis line exchange.  He comes in now due to not feeling well. CT imaging of the left limp noted a 6.3 cm fluid collection but no evidence of soft tissue gas. He was started on antimicrobial coverage. Nephrology was consulted for renal care.     Mr. Hemphill reports missed dialysis since the Monday prior to his presentation.  He was on 2 L by nasal cannula.  But he has rather significant fluid overload, thigh edema.  He is on erythropoietin, multifactorial anemia.  As the potential stump infection on the left, the right heel  is a potential infectious source as well.  I will dialyze him tomorrow, he will need more aggressive fluid removal.       Principal Problem:    Cellulitis and abscess of left leg      I spent 50 minutes in the professional and overall care of this patient.      Zeeshan Gonzalez [facility]

## 2023-11-14 NOTE — PROGRESS NOTES
Xiomara Hemphill is a 38 y.o. male on day 4 of admission presenting with Cellulitis and abscess of left leg.      Subjective   Pain in stump   Wants diluadid which he was getting before at [facility] and last hospital stay     Objective     Last Recorded Vitals  /80   Pulse 94   Temp 36.2 °C (97.2 °F)   Resp 18   Wt 79.4 kg (175 lb 0.7 oz)   SpO2 98%   Intake/Output last 3 Shifts:    Intake/Output Summary (Last 24 hours) at 11/14/2023 1737  Last data filed at 11/13/2023 2229  Gross per 24 hour   Intake 240 ml   Output --   Net 240 ml       Admission Weight  Weight: 79.4 kg (175 lb 0.7 oz) (11/10/23 1612)    Daily Weight  11/10/23 : 79.4 kg (175 lb 0.7 oz)    Image Results  Transthoracic Echo (TTE) Complete     [facility], [address] [place] [address]               Tel [phone] and Fax [phone]    TRANSTHORACIC ECHOCARDIOGRAM REPORT       Patient Name:      XIOMARA HEMPHILL         Reading Physician:    [id number] Grissom MD  Study Date:        11/14/2023           Ordering Provider:    [id number] NOEL VASQUEZ  MRN/PID:           [id number]             Fellow:  Accession#:        [id number]         Nurse:  Date of Birth/Age: 1985 / 38 years Sonographer:          Sonja Gonzalez RDCS  Gender:            M                    Additional Staff:  Height:            175.00 cm            Admit Date:           11/10/2023  Weight:            79.00 kg             Admission Status:     Inpatient -                                                                Routine  BSA:               1.95 m2              Encounter#:           [id number]                                          Department Location:  [facility] Telemetry  Blood Pressure: 126 /78 mmHg    Study Type:    TRANSTHORACIC ECHO (TTE)  COMPLETE  Diagnosis/ICD: Bacteremia-R78.81  Indication:    Cellulitis and abscess of left leg, Bacteremia associated with                 intravascular line, initial encounter    Patient History:  Pertinent History: HTN, Hyperlipidemia and PVD.    Study Detail: The following Echo studies were performed: 2D, M-Mode, Doppler and                color flow. Technically challenging study due to patient lying in                supine position and body habitus. Definity used as a contrast                agent for endocardial border definition. Total contrast used for                this procedure was 3 mL via IV push.       PHYSICIAN INTERPRETATION:  Left Ventricle: The left ventricular systolic function is normal, with an estimated ejection fraction of 55%. There are no regional wall motion abnormalities. The left ventricular cavity size is normal. The left ventricular septal wall thickness is mildly increased. Spectral Doppler shows an impaired relaxation pattern of left ventricular diastolic filling.  Left Atrium: The left atrium is normal in size.  Right Ventricle: The right ventricle is normal in size. There is normal right ventricular global systolic function.  Right Atrium: The right atrium is normal in size.  Aortic Valve: The aortic valve is trileaflet. There is mild aortic valve cusp calcification. There is mild aortic valve thickening. There is no evidence of aortic valve regurgitation. The peak instantaneous gradient of the aortic valve is 9.4 mmHg. The mean gradient of the aortic valve is 5.0 mmHg.  Mitral Valve: The mitral valve is mildly thickened. There is no evidence of mitral valve regurgitation. There is some anterior leaflet calcification and papillary calcification.  Tricuspid Valve: The tricuspid valve is structurally normal. There is trace tricuspid regurgitation. The Doppler estimated RVSP is within normal limits at 22.2 mmHg.  Pulmonic Valve: The pulmonic valve is structurally normal. There is no  indication of pulmonic valve regurgitation.  Pericardium: There is no pericardial effusion noted.  Aorta: The aortic root is normal.  In comparison to the previous echocardiogram(s): Compared with study from 6/22/2023, again echocardiographic views are limited. There is no significant difference in valvular anatomy and function to suggest active endocarditis.       CONCLUSIONS:   1. Left ventricular systolic function is normal with a 55% estimated ejection fraction.   2. Spectral Doppler shows an impaired relaxation pattern of left ventricular diastolic filling.   3. No evidence of vegetations present.   4. RVSP within normal limits.   5. Poorly visualized anatomical structures due to suboptimal image quality.   6. Compared with study from 6/22/2023, again echocardiographic views are limited. There is no significant difference in valvular anatomy and function to suggest active endocarditis.    QUANTITATIVE DATA SUMMARY:  2D MEASUREMENTS:                           Normal Ranges:  LAs:           3.10 cm   (2.7-4.0cm)  IVSd:          1.20 cm   (0.6-1.1cm)  LVPWd:         1.00 cm   (0.6-1.1cm)  LVIDd:         3.90 cm   (3.9-5.9cm)  LVIDs:         2.90 cm  LV Mass Index: 72.0 g/m2  LV % FS        25.6 %    LA VOLUME:                                Normal Ranges:  LA Vol A4C:        32.9 ml    (22+/-6mL/m2)  LA Vol A2C:        49.3 ml  LA Vol BP:         40.4 ml  LA Vol Index A4C:  16.9ml/m2  LA Vol Index A2C:  25.4 ml/m2  LA Vol Index BP:   20.8 ml/m2  LA Area A4C:       13.1 cm2  LA Area A2C:       16.0 cm2  LA Major Axis A4C: 4.4 cm  LA Major Axis A2C: 4.4 cm  LA Volume Index:   20.8 ml/m2    RA VOLUME BY A/L METHOD:                       Normal Ranges:  RA Area A4C: 9.9 cm2    AORTA MEASUREMENTS:                       Normal Ranges:  Ao Sinus, d: 2.41 cm (2.1-3.5cm)  Ao STJ, d:   2.49 cm (1.7-3.4cm)  Asc Ao, d:   2.40 cm (2.1-3.4cm)    LV SYSTOLIC FUNCTION BY 2D PLANIMETRY (MOD):                      Normal  Ranges:  EF-A4C View: 54.1 % (>=55%)  EF-A2C View: 53.7 %  EF-Biplane:  54.1 %    LV DIASTOLIC FUNCTION:                                Normal Ranges:  MV Peak E:        0.45 m/s    (0.7-1.2 m/s)  MV Peak A:        0.69 m/s    (0.42-0.7 m/s)  E/A Ratio:        0.65        (1.0-2.2)  MV e'             0.06 m/s    (>8.0)  MV lateral e'     0.07 m/s  MV medial e'      0.06 m/s  MV A Dur:         108.00 msec  E/e' Ratio:       7.48        (<8.0)  PulmV Sys Nilesh:    41.10 cm/s  PulmV Munroe Nilesh:   24.20 cm/s  PulmV S/D Nilesh:    1.70  PulmV A Revs Nilesh: 18.20 cm/s  PulmV A Revs Dur: 111.00 msec    MITRAL VALVE:                  Normal Ranges:  MV DT: 106 msec (150-240msec)    AORTIC VALVE:                                    Normal Ranges:  AoV Vmax:                1.53 m/s (<=1.7m/s)  AoV Peak P.4 mmHg (<20mmHg)  AoV Mean P.0 mmHg (1.7-11.5mmHg)  LVOT Max Nilesh:            0.93 m/s (<=1.1m/s)  AoV VTI:                 22.80 cm (18-25cm)  LVOT VTI:                16.10 cm  LVOT Diameter:           2.10 cm  (1.8-2.4cm)  AoV Area, VTI:           2.45 cm2 (2.5-5.5cm2)  AoV Area,Vmax:           2.11 cm2 (2.5-4.5cm2)  AoV Dimensionless Index: 0.71       RIGHT VENTRICLE:  RV Basal 2.73 cm  RV Mid   2.09 cm  RV Major 6.9 cm  TAPSE:   17.9 mm    TRICUSPID VALVE/RVSP:                              Normal Ranges:  Peak TR Velocity: 2.19 m/s  Est. RA Pressure: 3 mmHg  RV Syst Pressure: 22.2 mmHg (< 30mmHg)  IVC Diam:         0.96 cm    PULMONIC VALVE:                          Normal Ranges:  PV Accel Time: 90 msec  (>120ms)  PV Max Nilesh:    1.0 m/s  (0.6-0.9m/s)  PV Max P.2 mmHg    Pulmonary Veins:  PulmV A Revs Dur: 111.00 msec  PulmV A Revs Nilesh: 18.20 cm/s  PulmV Munroe Nilesh:   24.20 cm/s  PulmV S/D Nilesh:    1.70  PulmV Sys Nilesh:    41.10 cm/s       [id number] Mik Grissom MD  Electronically signed on 2023 at 12:37:35 PM       ** Final **      PHYSICAL EXAM  NEUROLOGICAL: No abnormal movements, no  changes from before  HEENT: Head atraumatic, perrl,eomi, no oral lesions, no ear rash   NECK: Supple, no ln, jvp flat, thyroid palp  HEART: S1, S2, no added sound  LUNGS: CTAB, no crackles, no rales, no wheezing, no dullness to percussion, sym exp  EXTREMITIES: no edema  ABDOMEN: Soft, nontender, bowel sound positive, no organomegaly  SKIN: No change  EYES: No changes, perrl, eomi,   ENT: No change    JOINT: No change  Relevant Results               Assessment/Plan        Principal Problem:    Cellulitis and abscess of left leg    CLABSI  Esrd  Anemia chr disease  T2dm  adjust insulin      PLAN  Would consult vascular surgery    Jalil   t35  Mauricio Estrada MD

## 2023-11-14 NOTE — PROGRESS NOTES
Ewdar Hemphill is a 38 y.o. male on day 3 of admission presenting with Cellulitis and abscess of left leg.      Subjective   Refused procrit inj earlier        Objective     Last Recorded Vitals  /77   Pulse 92   Temp 36.4 °C (97.5 °F) (Temporal)   Resp 17   Wt 79.4 kg (175 lb 0.7 oz)   SpO2 97%   Intake/Output last 3 Shifts:    Intake/Output Summary (Last 24 hours) at 11/13/2023 2028  Last data filed at 11/13/2023 1521  Gross per 24 hour   Intake 3660 ml   Output 405 ml   Net 3255 ml       Admission Weight  Weight: 79.4 kg (175 lb 0.7 oz) (11/10/23 1612)    Daily Weight  11/10/23 : 79.4 kg (175 lb 0.7 oz)    Image Results  XR chest 1 view  Narrative: STUDY:  Chest Radiograph;  11/10/2023 3:39 PM  INDICATION:  Chest pain with shortness of breath.  COMPARISON:  9/14/2023 XR chest.  ACCESSION NUMBER(S):  [id number]  ORDERING CLINICIAN:  JOSE ANTONIO THURMAN  TECHNIQUE:  Frontal chest was obtained at 15:30 hours.  FINDINGS:  CARDIOMEDIASTINAL SILHOUETTE:  Cardiomediastinal silhouette is normal in size and configuration.     LUNGS:  Linear scarring or discoid atelectasis remains at the left lung base  along with some blunting of the left lateral costophrenic angle.   There also appears to be some minimal blunting of the right lateral  costophrenic angle on today's exam.  There is a catheter extending  superiorly from the abdomen to the area of the lower superior vena  cava..     ABDOMEN:  No remarkable upper abdominal findings.     BONES:  No acute osseous changes.  Impression: Linear scarring and/or discoid atelectasis with some blunting of the  left lateral costophrenic angle remain.  On today's exam there is also  questionable very tiny right pleural effusion.  The lungs otherwise  are clear and there is no pneumothorax..  Signed by Daniel Shay MD  CT femur left wo IV contrast  Narrative: STUDY:  CT Extremity; Completed Time:  11/10/2023 1:27 PM.  INDICATION:  Sepsis, wound vac left leg.  Tender stump.   Evaluate for abscess.  COMPARISON:  MR LT femur 7/19/2023.  CT left lower extremity 7/2/2023.  ACCESSION NUMBER(S):  [id number]  ORDERING CLINICIAN:  JOSE ANTONIO THURMAN  TECHNIQUE:  Thin section axial images were obtained through the left  femur without intravenous contrast.  Orthogonal reconstructed images  were obtained and reviewed.    Automated mA/kV exposure control was utilized and patient examination  was performed in strict accordance with principles of ALARA.  FINDINGS:   Bone mineralization is decreased..  There is no evidence of acute fracture or dislocation.  An amputation margin is seen involving the distal femoral diaphysis  are 0.21 cm inferior to the lesser trochanter.  There is  benign-appearing periosteal new bone formation seen about the  amputation site which might simply be related to the amputation  itself.  No definite disruption of the cortex is seen however full  cortication of the amputation margin is not present.    Dense atherosclerotic calcific changes are present.  There is congestion of the subcutaneous lymphatics fairly diffusely.  There is thickening of the skin of the lateral 5 which may be related  to edema and/or infection and/or inflammation.  There is a fluid collection seen at the amputation margin which  measures maximally 6.3 cm in transverse as measured on axial series  606 image 188, 4.6 cm in maximum height as measured on series 604,  image 60 and 2.7 cm in maximum AP diameter as measured on series 606  image 188.  Infected fluid is not excluded.  There is no evidence of  soft tissue gas.  Impression: THIS STUDY WAS PERFORMED WITHOUT IV CONTRAST.  THERE IS A 6.3 X 4.6 X  2.7 CM FLUID COLLECTION WHICH EXTENDS FROM THE AMPUTATION MARGIN TO  THE UNDERLYING SKIN.  INFECTED FLUID IS NOT EXCLUDED.  WITHOUT IV  CONTRAST, I COULD NOT EXCLUDE ABSCESS.  THERE IS HOWEVER NO EVIDENCE  OF SOFT TISSUE GAS.  THERE IS BENIGN PERIOSTEAL NEW BONE FORMATION SEEN ABOUT THE  RESECTION  MARGIN WHICH MIGHT SIMPLY BE RELATED TO THE AMPUTATION ITSELF.  ___   Signed by Nakul Kc MD      PHYSICAL EXAM  NEUROLOGICAL: No abnormal movements, no changes from before  HEENT: Head atraumatic, perrl,eomi, no oral lesions, no ear rash   NECK: Supple, no ln, jvp flat, thyroid palp  HEART: S1, S2, no added sound  LUNGS: CTAB, no crackles, no rales, no wheezing, no dullness to percussion, sym exp  EXTREMITIES: no edema  ABDOMEN: Soft, nontender, bowel sound positive, no organomegaly  SKIN: No change  EYES: No changes, perrl, eomi,   ENT: No change    JOINT: No change  Relevant Results               Assessment/Plan                  Principal Problem:    Cellulitis and abscess of left leg    Esrd  Anemia chr disease  T2dm     PLAN  Dc plans   t35              Mauricio Estrada MD

## 2023-11-15 ENCOUNTER — APPOINTMENT (OUTPATIENT)
Dept: DIALYSIS | Facility: HOSPITAL | Age: 38
End: 2023-11-15
Payer: MEDICARE

## 2023-11-15 LAB
ALBUMIN SERPL BCP-MCNC: 2.1 G/DL (ref 3.4–5)
ANION GAP SERPL CALC-SCNC: 14 MMOL/L (ref 10–20)
BUN SERPL-MCNC: 30 MG/DL (ref 6–23)
CALCIUM SERPL-MCNC: 8.3 MG/DL (ref 8.6–10.3)
CHLORIDE SERPL-SCNC: 97 MMOL/L (ref 98–107)
CO2 SERPL-SCNC: 26 MMOL/L (ref 21–32)
CREAT SERPL-MCNC: 5.98 MG/DL (ref 0.5–1.3)
ERYTHROCYTE [DISTWIDTH] IN BLOOD BY AUTOMATED COUNT: 18.5 % (ref 11.5–14.5)
GFR SERPL CREATININE-BSD FRML MDRD: 11 ML/MIN/1.73M*2
GLUCOSE BLD MANUAL STRIP-MCNC: 105 MG/DL (ref 74–99)
GLUCOSE BLD MANUAL STRIP-MCNC: 233 MG/DL (ref 74–99)
GLUCOSE BLD MANUAL STRIP-MCNC: 248 MG/DL (ref 74–99)
GLUCOSE BLD MANUAL STRIP-MCNC: 307 MG/DL (ref 74–99)
GLUCOSE BLD MANUAL STRIP-MCNC: 97 MG/DL (ref 74–99)
GLUCOSE SERPL-MCNC: 135 MG/DL (ref 74–99)
HCT VFR BLD AUTO: 29.5 % (ref 41–52)
HGB BLD-MCNC: 9.1 G/DL (ref 13.5–17.5)
MCH RBC QN AUTO: 26.9 PG (ref 26–34)
MCHC RBC AUTO-ENTMCNC: 30.8 G/DL (ref 32–36)
MCV RBC AUTO: 87 FL (ref 80–100)
NRBC BLD-RTO: 0 /100 WBCS (ref 0–0)
PHOSPHATE SERPL-MCNC: 4.9 MG/DL (ref 2.5–4.9)
PLATELET # BLD AUTO: 283 X10*3/UL (ref 150–450)
POTASSIUM SERPL-SCNC: 4.1 MMOL/L (ref 3.5–5.3)
RBC # BLD AUTO: 3.38 X10*6/UL (ref 4.5–5.9)
SODIUM SERPL-SCNC: 133 MMOL/L (ref 136–145)
VANCOMYCIN TROUGH SERPL-MCNC: 24.4 UG/ML (ref 5–20)
WBC # BLD AUTO: 7.3 X10*3/UL (ref 4.4–11.3)

## 2023-11-15 PROCEDURE — 82947 ASSAY GLUCOSE BLOOD QUANT: CPT

## 2023-11-15 PROCEDURE — 2500000004 HC RX 250 GENERAL PHARMACY W/ HCPCS (ALT 636 FOR OP/ED): Performed by: PHYSICIAN ASSISTANT

## 2023-11-15 PROCEDURE — 2500000001 HC RX 250 WO HCPCS SELF ADMINISTERED DRUGS (ALT 637 FOR MEDICARE OP): Performed by: SPECIALIST

## 2023-11-15 PROCEDURE — 36415 COLL VENOUS BLD VENIPUNCTURE: CPT | Performed by: PHYSICIAN ASSISTANT

## 2023-11-15 PROCEDURE — 96372 THER/PROPH/DIAG INJ SC/IM: CPT | Performed by: PHYSICIAN ASSISTANT

## 2023-11-15 PROCEDURE — 80069 RENAL FUNCTION PANEL: CPT | Performed by: PHYSICIAN ASSISTANT

## 2023-11-15 PROCEDURE — 80202 ASSAY OF VANCOMYCIN: CPT | Performed by: PHYSICIAN ASSISTANT

## 2023-11-15 PROCEDURE — 2500000002 HC RX 250 W HCPCS SELF ADMINISTERED DRUGS (ALT 637 FOR MEDICARE OP, ALT 636 FOR OP/ED): Performed by: PHYSICIAN ASSISTANT

## 2023-11-15 PROCEDURE — 85027 COMPLETE CBC AUTOMATED: CPT | Performed by: PHYSICIAN ASSISTANT

## 2023-11-15 PROCEDURE — 1100000001 HC PRIVATE ROOM DAILY

## 2023-11-15 PROCEDURE — 2500000004 HC RX 250 GENERAL PHARMACY W/ HCPCS (ALT 636 FOR OP/ED): Performed by: INTERNAL MEDICINE

## 2023-11-15 PROCEDURE — 6350000001 HC RX 635 EPOETIN >10,000 UNITS: Mod: JW | Performed by: PHYSICIAN ASSISTANT

## 2023-11-15 PROCEDURE — 8010000001 HC DIALYSIS - HEMODIALYSIS PER DAY

## 2023-11-15 RX ORDER — VANCOMYCIN HYDROCHLORIDE 1 G/200ML
1000 INJECTION, SOLUTION INTRAVENOUS ONCE
Status: COMPLETED | OUTPATIENT
Start: 2023-11-15 | End: 2023-11-15

## 2023-11-15 RX ADMIN — VANCOMYCIN HYDROCHLORIDE 1000 MG: 1 INJECTION, SOLUTION INTRAVENOUS at 18:14

## 2023-11-15 RX ADMIN — SEVELAMER CARBONATE 1600 MG: 800 TABLET, FILM COATED ORAL at 18:13

## 2023-11-15 RX ADMIN — HYDROMORPHONE HYDROCHLORIDE 2 MG: 2 TABLET ORAL at 21:15

## 2023-11-15 RX ADMIN — INSULIN LISPRO 6 UNITS: 100 INJECTION, SOLUTION INTRAVENOUS; SUBCUTANEOUS at 18:10

## 2023-11-15 RX ADMIN — PIPERACILLIN SODIUM AND TAZOBACTAM SODIUM 2.25 G: 2; .25 INJECTION, SOLUTION INTRAVENOUS at 05:39

## 2023-11-15 RX ADMIN — PANTOPRAZOLE SODIUM 40 MG: 40 TABLET, DELAYED RELEASE ORAL at 05:54

## 2023-11-15 RX ADMIN — EPOETIN ALFA-EPBX 8000 UNITS: 10000 INJECTION, SOLUTION INTRAVENOUS; SUBCUTANEOUS at 18:13

## 2023-11-15 RX ADMIN — INSULIN GLARGINE 10 UNITS: 100 INJECTION, SOLUTION SUBCUTANEOUS at 21:39

## 2023-11-15 RX ADMIN — SEVELAMER CARBONATE 1600 MG: 800 TABLET, FILM COATED ORAL at 12:28

## 2023-11-15 ASSESSMENT — PAIN - FUNCTIONAL ASSESSMENT: PAIN_FUNCTIONAL_ASSESSMENT: NO/DENIES PAIN

## 2023-11-15 ASSESSMENT — COGNITIVE AND FUNCTIONAL STATUS - GENERAL
WALKING IN HOSPITAL ROOM: TOTAL
DAILY ACTIVITIY SCORE: 10
STANDING UP FROM CHAIR USING ARMS: TOTAL
PERSONAL GROOMING: TOTAL
TOILETING: TOTAL
MOBILITY SCORE: 11
TURNING FROM BACK TO SIDE WHILE IN FLAT BAD: A LITTLE
MOVING FROM LYING ON BACK TO SITTING ON SIDE OF FLAT BED WITH BEDRAILS: A LITTLE
HELP NEEDED FOR BATHING: TOTAL
MOVING TO AND FROM BED TO CHAIR: A LOT
DRESSING REGULAR UPPER BODY CLOTHING: A LOT
DRESSING REGULAR LOWER BODY CLOTHING: TOTAL
CLIMB 3 TO 5 STEPS WITH RAILING: TOTAL

## 2023-11-15 ASSESSMENT — PAIN SCALES - GENERAL
PAINLEVEL_OUTOF10: 0 - NO PAIN
PAINLEVEL_OUTOF10: 7

## 2023-11-15 NOTE — PROCEDURES
Seen on dialysis.  3 potassium bath today, attempting 3 L of fluid removal.  Antibiotics are noted.  Hoping to get discharged back to his prior skilled nursing facility.  Please communicate the plans regarding his antibiotics at dialysis, duration and dosing.

## 2023-11-15 NOTE — PROGRESS NOTES
"Vancomycin Dosing by Pharmacy- FOLLOW UP    Edwar Hemphill is a 38 y.o. year old male who Pharmacy has been consulted for vancomycin dosing for line infections. Based on the patient's indication and renal status this patient is being dosed based on a goal pre-HD level of 15-25.     Renal function is currently stable, on HD      Most recent random level: 24.4 mcg/mL    Visit Vitals  /86 (BP Location: Left arm, Patient Position: Lying)   Pulse 80   Temp 36.7 °C (98.1 °F) (Temporal)   Resp 18        Lab Results   Component Value Date    CREATININE 5.98 (H) 11/15/2023    CREATININE 5.12 (H) 11/14/2023    CREATININE 6.76 (H) 11/13/2023    CREATININE 5.81 (H) 11/12/2023        Patient weight is No results found for: \"PTWEIGHT\"    No results found for: \"CULTURE\"     I/O last 3 completed shifts:  In: 240 (3 mL/kg) [P.O.:240]  Out: - (0 mL/kg)   Weight: 79.4 kg   [unfilled]    Lab Results   Component Value Date    PATIENTTEMP 37.0 07/09/2023    PATIENTTEMP 37.0 07/08/2023    PATIENTTEMP 37.0 07/08/2023        Assessment/Plan    Within goal random/trough level, will redose another 1g after dialysis    The next level will be obtained on 11/17  at am lab. May be obtained sooner if clinically indicated.   Will continue to monitor renal function daily while on vancomycin and order serum creatinine at least every 48 hours if not already ordered.  Follow for continued vancomycin needs, clinical response, and signs/symptoms of toxicity.       Navjot Manley, PharmD           "

## 2023-11-15 NOTE — PROGRESS NOTES
"  INFECTIOUS DISEASE DAILY PROGRESS NOTE    SUBJECTIVE:    No overnight events. No new complaints. Afebrile.    OBJECTIVE:  VITALS (Last 24 Hours)  BP (!) 146/98   Pulse 93   Temp 36.7 °C (98.1 °F) (Temporal)   Resp 18   Ht 1.753 m (5' 9.02\")   Wt 79.4 kg (175 lb 0.7 oz)   SpO2 95%   BMI 25.84 kg/m²     PHYSICAL EXAM:  Gen - NAD  RUE - s/p hand amputation  LLE - s/p AKA with wound vac on stump  RLE - fem HD cath present  Skin - no rash     ABX: IV Vanc/Zosyn     LABS:  Lab Results   Component Value Date    WBC 7.3 11/15/2023    HGB 9.1 (L) 11/15/2023    HCT 29.5 (L) 11/15/2023    MCV 87 11/15/2023     11/15/2023     Lab Results   Component Value Date    GLUCOSE 135 (H) 11/15/2023    CALCIUM 8.3 (L) 11/15/2023     (L) 11/15/2023    K 4.1 11/15/2023    CO2 26 11/15/2023    CL 97 (L) 11/15/2023    BUN 30 (H) 11/15/2023    CREATININE 5.98 (H) 11/15/2023     Results from last 72 hours   Lab Units 11/15/23  0705   ALBUMIN g/dL 2.1*     Estimated Creatinine Clearance: 16.6 mL/min (A) (by C-G formula based on SCr of 5.98 mg/dL (H)).      ASSESSMENT/PLAN:     CLABSI (right fem HD cath POA) due to MRSA and Strep dysgalactiae/canis - acute life threatening condition needing IV abx. HD cath exchanged over wire 11/13. Repeat blood cx 11/14 NGTD. TTE is without signs of endocarditis.  LLE AKA Stump Infection with Abscess - would suspect this is an abscess given bacteremia, source may be this and the line secondarily seeded  ESRD on HD     IV Vancomycin/Zosyn.    Vascular surgery consult pending to assess the fluid collection in the left AKA stump.     Monitoring for adverse effects of abx such as rash/itching/diarrhea.     Will follow. Thanks!    Mat Jauregui MD  [facility]  Office #[phone]      "

## 2023-11-16 LAB
ALBUMIN SERPL BCP-MCNC: 2.4 G/DL (ref 3.4–5)
ANION GAP SERPL CALC-SCNC: 14 MMOL/L (ref 10–20)
BACTERIA BLD AEROBE CULT: ABNORMAL
BACTERIA BLD CULT: ABNORMAL
BUN SERPL-MCNC: 17 MG/DL (ref 6–23)
CALCIUM SERPL-MCNC: 8.6 MG/DL (ref 8.6–10.3)
CHLORIDE SERPL-SCNC: 99 MMOL/L (ref 98–107)
CO2 SERPL-SCNC: 27 MMOL/L (ref 21–32)
CREAT SERPL-MCNC: 4.39 MG/DL (ref 0.5–1.3)
ERYTHROCYTE [DISTWIDTH] IN BLOOD BY AUTOMATED COUNT: 18.7 % (ref 11.5–14.5)
GFR SERPL CREATININE-BSD FRML MDRD: 17 ML/MIN/1.73M*2
GLUCOSE BLD MANUAL STRIP-MCNC: 139 MG/DL (ref 74–99)
GLUCOSE BLD MANUAL STRIP-MCNC: 197 MG/DL (ref 74–99)
GLUCOSE BLD MANUAL STRIP-MCNC: 62 MG/DL (ref 74–99)
GLUCOSE BLD MANUAL STRIP-MCNC: 71 MG/DL (ref 74–99)
GLUCOSE BLD MANUAL STRIP-MCNC: 73 MG/DL (ref 74–99)
GLUCOSE SERPL-MCNC: 59 MG/DL (ref 74–99)
GRAM STN SPEC: ABNORMAL
HCT VFR BLD AUTO: 29.5 % (ref 41–52)
HGB BLD-MCNC: 9 G/DL (ref 13.5–17.5)
MCH RBC QN AUTO: 26.8 PG (ref 26–34)
MCHC RBC AUTO-ENTMCNC: 30.5 G/DL (ref 32–36)
MCV RBC AUTO: 88 FL (ref 80–100)
NRBC BLD-RTO: 0 /100 WBCS (ref 0–0)
PHOSPHATE SERPL-MCNC: 4.2 MG/DL (ref 2.5–4.9)
PLATELET # BLD AUTO: 333 X10*3/UL (ref 150–450)
POTASSIUM SERPL-SCNC: 3.7 MMOL/L (ref 3.5–5.3)
RBC # BLD AUTO: 3.36 X10*6/UL (ref 4.5–5.9)
SODIUM SERPL-SCNC: 136 MMOL/L (ref 136–145)
WBC # BLD AUTO: 8 X10*3/UL (ref 4.4–11.3)

## 2023-11-16 PROCEDURE — 84100 ASSAY OF PHOSPHORUS: CPT | Performed by: PHYSICIAN ASSISTANT

## 2023-11-16 PROCEDURE — 99233 SBSQ HOSP IP/OBS HIGH 50: CPT | Performed by: NURSE PRACTITIONER

## 2023-11-16 PROCEDURE — 2500000002 HC RX 250 W HCPCS SELF ADMINISTERED DRUGS (ALT 637 FOR MEDICARE OP, ALT 636 FOR OP/ED): Performed by: PHYSICIAN ASSISTANT

## 2023-11-16 PROCEDURE — 2500000004 HC RX 250 GENERAL PHARMACY W/ HCPCS (ALT 636 FOR OP/ED): Performed by: PHYSICIAN ASSISTANT

## 2023-11-16 PROCEDURE — 36415 COLL VENOUS BLD VENIPUNCTURE: CPT | Performed by: PHYSICIAN ASSISTANT

## 2023-11-16 PROCEDURE — 1100000001 HC PRIVATE ROOM DAILY

## 2023-11-16 PROCEDURE — 2500000001 HC RX 250 WO HCPCS SELF ADMINISTERED DRUGS (ALT 637 FOR MEDICARE OP): Performed by: SPECIALIST

## 2023-11-16 PROCEDURE — 85027 COMPLETE CBC AUTOMATED: CPT | Performed by: PHYSICIAN ASSISTANT

## 2023-11-16 PROCEDURE — 82947 ASSAY GLUCOSE BLOOD QUANT: CPT

## 2023-11-16 RX ORDER — PANTOPRAZOLE SODIUM 40 MG/1
40 TABLET, DELAYED RELEASE ORAL
Qty: 30 TABLET | Refills: 11
Start: 2023-11-17 | End: 2024-11-16

## 2023-11-16 RX ORDER — VANCOMYCIN HYDROCHLORIDE 1 G/200ML
1000 INJECTION, SOLUTION INTRAVENOUS 3 TIMES WEEKLY
Status: DISCONTINUED | OUTPATIENT
Start: 2023-11-17 | End: 2023-11-16

## 2023-11-16 RX ORDER — ACETAMINOPHEN 325 MG/1
650 TABLET ORAL EVERY 4 HOURS PRN
Qty: 30 TABLET | Refills: 0 | Status: SHIPPED | OUTPATIENT
Start: 2023-11-16 | End: 2024-11-15

## 2023-11-16 RX ORDER — INSULIN LISPRO 100 [IU]/ML
0-15 INJECTION, SOLUTION INTRAVENOUS; SUBCUTANEOUS
Qty: 13.5 ML | Refills: 11
Start: 2023-11-17 | End: 2024-01-22 | Stop reason: HOSPADM

## 2023-11-16 RX ORDER — INSULIN GLARGINE 100 [IU]/ML
10 INJECTION, SOLUTION SUBCUTANEOUS NIGHTLY
Qty: 3 ML | Refills: 11
Start: 2023-11-16 | End: 2024-01-22 | Stop reason: HOSPADM

## 2023-11-16 RX ORDER — OXYCODONE AND ACETAMINOPHEN 5; 325 MG/1; MG/1
1 TABLET ORAL EVERY 6 HOURS PRN
Status: DISCONTINUED | OUTPATIENT
Start: 2023-11-16 | End: 2023-11-17 | Stop reason: HOSPADM

## 2023-11-16 RX ORDER — ONDANSETRON 4 MG/1
4 TABLET, FILM COATED ORAL EVERY 8 HOURS PRN
Qty: 20 TABLET | Refills: 0
Start: 2023-11-16 | End: 2023-12-12

## 2023-11-16 RX ORDER — TALC
3 POWDER (GRAM) TOPICAL NIGHTLY PRN
Qty: 15 TABLET | Refills: 0
Start: 2023-11-16 | End: 2024-05-26 | Stop reason: HOSPADM

## 2023-11-16 RX ORDER — OXYCODONE AND ACETAMINOPHEN 5; 325 MG/1; MG/1
1 TABLET ORAL EVERY 6 HOURS PRN
Qty: 10 TABLET | Refills: 0 | Status: SHIPPED | OUTPATIENT
Start: 2023-11-16

## 2023-11-16 RX ADMIN — HYDROMORPHONE HYDROCHLORIDE 2 MG: 2 TABLET ORAL at 16:36

## 2023-11-16 RX ADMIN — INSULIN LISPRO 3 UNITS: 100 INJECTION, SOLUTION INTRAVENOUS; SUBCUTANEOUS at 17:06

## 2023-11-16 RX ADMIN — SEVELAMER CARBONATE 1600 MG: 800 TABLET, FILM COATED ORAL at 11:53

## 2023-11-16 RX ADMIN — PANTOPRAZOLE SODIUM 40 MG: 40 TABLET, DELAYED RELEASE ORAL at 06:50

## 2023-11-16 ASSESSMENT — COGNITIVE AND FUNCTIONAL STATUS - GENERAL
DRESSING REGULAR LOWER BODY CLOTHING: TOTAL
STANDING UP FROM CHAIR USING ARMS: TOTAL
CLIMB 3 TO 5 STEPS WITH RAILING: TOTAL
MOBILITY SCORE: 8
TURNING FROM BACK TO SIDE WHILE IN FLAT BAD: A LOT
MOVING TO AND FROM BED TO CHAIR: TOTAL
HELP NEEDED FOR BATHING: A LOT
DRESSING REGULAR UPPER BODY CLOTHING: A LITTLE
WALKING IN HOSPITAL ROOM: TOTAL
PERSONAL GROOMING: A LITTLE
DAILY ACTIVITIY SCORE: 14
MOVING FROM LYING ON BACK TO SITTING ON SIDE OF FLAT BED WITH BEDRAILS: A LOT
TOILETING: TOTAL

## 2023-11-16 ASSESSMENT — PAIN DESCRIPTION - LOCATION: LOCATION: LEG

## 2023-11-16 ASSESSMENT — PAIN SCALES - GENERAL
PAINLEVEL_OUTOF10: 0 - NO PAIN
PAINLEVEL_OUTOF10: 7
PAINLEVEL_OUTOF10: 3

## 2023-11-16 ASSESSMENT — PAIN DESCRIPTION - ORIENTATION: ORIENTATION: RIGHT

## 2023-11-16 NOTE — CONSULTS
Reason For Consult  ESRD on hemodialysis     History Of Present Illness  Edwar Hemphill is a 38 y.o. male with a past medical history of end-stage renal disease on hemodialysis via right femoral TDC who resides at [facility]. 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  He later had stump debridement on 7/27 with wound VAC placement.  He had polymicrobial infection including resistant E coli, Acenetobacter baumannii and Enterobacter fecalis. He was admitted in September with positive blood cultures growing Acenetobacter baumannii and Stenotrophomonas. He went to IR for dialysis line exchange.  He comes in now due to not feeling well. CT imaging of the left limp noted a 6.3 cm fluid collection but no evidence of soft tissue gas. He was started on antimicrobial coverage. Nephrology was consulted for renal care.  Dialysis yesterday without event.     Past Medical History:   Diagnosis Date    Essential (primary) hypertension 05/26/2017    HTN (hypertension), benign    Personal history of other endocrine, nutritional and metabolic disease 05/26/2017    History of diabetes mellitus       Past Surgical History:   Procedure Laterality Date    CT AORTA AND BILATERAL ILIOFEMORAL RUNOFF ANGIOGRAM W AND/OR WO IV CONTRAST  2/9/2023    CT AORTA AND BILATERAL ILIOFEMORAL RUNOFF ANGIOGRAM W AND/OR WO IV CONTRAST 2/9/2023 [facility]    IR CVC EXCHANGE  11/13/2023    IR CVC EXCHANGE 11/13/2023 [facility] CVEPINV       Social History     Tobacco Use    Smoking status: Every Day     Packs/day: 0.25     Years: 15.00     Additional pack years: 0.00     Total pack years: 3.75     Types: Cigarettes    Smokeless  tobacco: Never        Family History  Family History   Problem Relation Name Age of Onset    Other (DIABETES DUE TO UNDERLYING CONDITION WITH MICROALBUMINURIA) Father      Other (DIABETES DUE TO UNDERLYING CONDITION WITH MICROALBUMINURIA [Other]) Other AUNT     Other (DIABETES DUE TO UNDERLYING CONDITION WITH MICROALBUMINURIA [Other]) Other GP         Allergies  Patient has no known allergies.    Review of Systems   10 point review of systems obtained and negative unless stated in HPI:     Physical Exam   Constitutional: Well developed, awake/alert/oriented  x3   Eyes: Periorbital swelling   ENMT: mucous membranes moist   Head/Neck: Facial edema  Difficult to assess JVD   Respiratory/Thorax: Diminished bibasilar breath sounds,  no wheezing, rales or rhonchi   Cardiovascular: regular rhythm, normal  S1 and S2   Gastrointestinal: Nondistended, soft, non-tender,  no rebound tenderness or guarding   Genitourinary: No Castanon   Extremities: Pitting right leg edema  L AKA   RUE amputation  RLE HD cathter   Neurological: No asterixis   Psychological: Appropriate mood and behavior   Skin: L AKA stump  Right femoral TDC           I&O 24HR    Intake/Output Summary (Last 24 hours) at 11/16/2023 0951  Last data filed at 11/15/2023 1814  Gross per 24 hour   Intake 1330 ml   Output 3000 ml   Net -1670 ml         Vitals 24HR  Heart Rate:  []   Temp:  [36.5 °C (97.7 °F)-36.9 °C (98.4 °F)]   Resp:  [17-18]   BP: (100-146)/(59-98)   SpO2:  [96 %-100 %]     Scheduled Medications  epoetin tyson or biosimilar, 8,000 Units, subcutaneous, Once per day on Mon Wed Fri  insulin glargine, 10 Units, subcutaneous, Nightly  insulin lispro, 0-15 Units, subcutaneous, TID with meals  pantoprazole, 40 mg, oral, Daily before breakfast  perflutren lipid microspheres, 0.5-10 mL of dilution, intravenous, Once in imaging  sevelamer carbonate, 1,600 mg, oral, TID with meals  sulfur hexafluoride microsphr, 2 mL, intravenous, Once in  imaging      Continuous medications       PRN medications: acetaminophen **OR** [DISCONTINUED] acetaminophen **OR** [DISCONTINUED] acetaminophen, dextrose 10 % in water (D10W), dextrose, fentaNYL PF, glucagon, heparin, HYDROmorphone, HYDROmorphone, lidocaine PF, melatonin, ondansetron **OR** ondansetron, simethicone, vancomycin     Relevant Results  Results from last 7 days   Lab Units 11/16/23 0553 11/15/23  0705 11/14/23  0634 11/11/23  0631 11/10/23  1207   WBC AUTO x10*3/uL 8.0 7.3 6.6   < > 19.7*   HEMOGLOBIN g/dL 9.0* 9.1* 9.4*   < > 10.2*   HEMATOCRIT % 29.5* 29.5* 30.7*   < > 32.7*   PLATELETS AUTO x10*3/uL 333 283 280   < > 370   NEUTROS PCT AUTO %  --   --   --   --  91.4   LYMPHS PCT AUTO %  --   --   --   --  2.2   MONOS PCT AUTO %  --   --   --   --  3.9   EOS PCT AUTO %  --   --   --   --  1.4    < > = values in this interval not displayed.        Results from last 7 days   Lab Units 11/16/23 0553 11/15/23  0705 11/14/23  0634 11/11/23  0631 11/10/23  1207   SODIUM mmol/L 136 133* 134*   < > 133*   POTASSIUM mmol/L 3.7 4.1 4.2   < > 5.3   CHLORIDE mmol/L 99 97* 98   < > 92*   CO2 mmol/L 27 26 25   < > 24   BUN mg/dL 17 30* 28*   < > 47*   CREATININE mg/dL 4.39* 5.98* 5.12*   < > 7.92*   CALCIUM mg/dL 8.6 8.3* 8.2*   < > 8.8   PROTEIN TOTAL g/dL  --   --   --   --  7.1   BILIRUBIN TOTAL mg/dL  --   --   --   --  0.7   ALK PHOS U/L  --   --   --   --  401*   ALT U/L  --   --   --   --  10   AST U/L  --   --   --   --  10   GLUCOSE mg/dL 59* 135* 78   < > 131*    < > = values in this interval not displayed.        Transthoracic Echo (TTE) Complete   Final Result      IR CVC exchange   Final Result   Fluoroscopically guided exchange of right groin tunneled hemodialysis   catheter. The newly exchange catheter is ready for immediate use.             MACRO:   None        Signed by: Ronaldo Dempsey 11/14/2023 9:10 PM   Dictation workstation:   [id number]      XR chest 1 view   Final Result   Linear  scarring and/or discoid atelectasis with some blunting of the   left lateral costophrenic angle remain.  On today's exam there is also   questionable very tiny right pleural effusion.  The lungs otherwise   are clear and there is no pneumothorax..   Signed by Daniel Shay MD      CT femur left wo IV contrast   Final Result   THIS STUDY WAS PERFORMED WITHOUT IV CONTRAST.  THERE IS A 6.3 X 4.6 X   2.7 CM FLUID COLLECTION WHICH EXTENDS FROM THE AMPUTATION MARGIN TO   THE UNDERLYING SKIN.  INFECTED FLUID IS NOT EXCLUDED.  WITHOUT IV   CONTRAST, I COULD NOT EXCLUDE ABSCESS.  THERE IS HOWEVER NO EVIDENCE   OF SOFT TISSUE GAS.   THERE IS BENIGN PERIOSTEAL NEW BONE FORMATION SEEN ABOUT THE RESECTION   MARGIN WHICH MIGHT SIMPLY BE RELATED TO THE AMPUTATION ITSELF.   ___    Signed by Nakul Kc MD            Assessment/Plan     Edwar Hemphill is a 38 y.o. male with a past medical history of end-stage renal disease on hemodialysis via right femoral TDC who resides at [facility]. 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  He later had stump debridement on 7/27 with wound VAC placement.  He had polymicrobial infection including resistant E coli, Acenetobacter baumannii and Enterobacter fecalis. He was admitted in September with positive blood cultures growing Acenetobacter baumannii and Stenotrophomonas. He went to IR for dialysis line exchange.  He comes in now due to not feeling well. CT imaging of the left limp noted a 6.3 cm fluid collection but no evidence of soft tissue gas. He was started on antimicrobial coverage. Nephrology was consulted for renal care.     Mr. Hemphill reports missed dialysis  since the Monday prior to his presentation.  He was on 2 L by nasal cannula.  But he has rather significant fluid overload, thigh edema.  He is on erythropoietin, multifactorial anemia.  He has the stump infection on the left, the right heel is a potential infectious source as well, and of course his line.  This line was exchanged, echocardiogram suggested against endocarditis.  Vancomycin through December 11.  I will dialyze him again tomorrow.      Principal Problem:    Cellulitis and abscess of left leg      I spent 35 minutes in the professional and overall care of this patient.      Zeeshan Gonzalez [facility]

## 2023-11-16 NOTE — PROGRESS NOTES
Edwar Hemphill is a 38 y.o. male on day 5 of admission presenting with Cellulitis and abscess of left leg.      Subjective   Chart rev       Objective     Last Recorded Vitals  /77   Pulse 109   Temp 36.5 °C (97.7 °F)   Resp 18   Wt 79.4 kg (175 lb 0.7 oz)   SpO2 100%   Intake/Output last 3 Shifts:    Intake/Output Summary (Last 24 hours) at 11/15/2023 2113  Last data filed at 11/15/2023 1814  Gross per 24 hour   Intake 1850 ml   Output 3000 ml   Net -1150 ml       Admission Weight  Weight: 79.4 kg (175 lb 0.7 oz) (11/10/23 1612)    Daily Weight  11/10/23 : 79.4 kg (175 lb 0.7 oz)    Image Results  IR CVC exchange  Narrative: Interpreted By:  Ronaldo Dempsey,   STUDY:  IR CVC EXCHANGE; ;  11/13/2023 3:22 pm      FLUOROSCOPICALLY GUIDED RIGHT COMMON FEMORAL VEIN TUNNELED  HEMODIALYSIS CATHETER EXCHANGE      INDICATION:  Signs/Symptoms:line exchange right fem HD cath given bacteremia.  38-year-old male with end-stage renal disease, bacteremia. Request  for guidewire exchange of right groin tunneled hemodialysis catheter.      COMPARISON:  Chest radiograph 11/10/2023, fluoroscopic images from prior catheter  exchange 09/15/2023      ACCESSION NUMBER(S):  [id number]      ORDERING CLINICIAN:  ALKA VIEYRA      TECHNIQUE:      ATTENDING : Ronaldo Dempsey M.D.      TECHNICAL DESCRIPTION/FINDINGS: The procedure, including all risks,  benefits and alternatives were explained to the patient in detail.  All questions were answered and written informed consent was obtained.      The patient was positioned supine on the fluoroscopy table. A  time-out was performed.      The right groin and indwelling tunneled hemodialysis catheter were  prepped and draped in usual sterile fashion. A  fluoroscopic  images than demonstrating grossly unchanged positioning of the  catheter terminating in the mid right atrium.      1% lidocaine was administered around the catheter for local  anesthesia. Blunt  dissection was performed to free the subcutaneous  catheter cuff. An 035 stiff Glidewire was advanced through the  catheter into the superior vena cava and the old catheter was  removed. Over the guidewire, a new 14.5 Israeli by 50 cm tip to cuff  dual lumen hemodialysis catheter was then placed. A completion  fluoroscopic image demonstrates the catheter tip terminating in the  mid right atrium.      Catheter lumens easily aspirated and flushed and were locked with a  concentrated heparinized solution (1000 units/cc). The catheter hub  was sutured to the skin with 2 0 Prolene stay suture. A sterile  dressing was applied.      SEDATION/MEDICATIONS: Continuous cardiopulmonary monitoring was  performed by a radiology nurse for the duration of the procedure. No  conscious sedation was administered. Procedural duration 15 minutes.  10 cc 1% Lidocaine was administered subcutaneously for local  anesthesia. SPECIMENS: None.  ESTIMATED BLOOD LOSS:  5 cc  FLOUROSCOPY:  0.4 minutes; DAP  615 mGy-cm*2; Air Kerma  27.84 mGy  CONTRAST: None.      FINDINGS:  Test      Impression: Fluoroscopically guided exchange of right groin tunneled hemodialysis  catheter. The newly exchange catheter is ready for immediate use.          MACRO:  None      Signed by: Ronaldo Dempsey 11/14/2023 9:10 PM  Dictation workstation:   [id number]  Transthoracic Echo (TTE) Complete     [facility], [address] [place] [address]               Tel [phone] and Fax [phone]    TRANSTHORACIC ECHOCARDIOGRAM REPORT       Patient Name:      XIOMARA Maya Physician:    [id number] Mik Grissom MD  Study Date:        11/14/2023           Ordering Provider:    [id number] NOEL VASQUEZ  MRN/PID:           [id number]             Fellow:  Accession#:        [id number]         Nurse:  Date of Birth/Age:  1985 / 38 years Sonographer:          Sonja Gonzalez RDCS  Gender:            M                    Additional Staff:  Height:            175.00 cm            Admit Date:           11/10/2023  Weight:            79.00 kg             Admission Status:     Inpatient -                                                                Routine  BSA:               1.95 m2              Encounter#:           [id number]                                          Department Location:  [facility] Telemetry  Blood Pressure: 126 /78 mmHg    Study Type:    TRANSTHORACIC ECHO (TTE) COMPLETE  Diagnosis/ICD: Bacteremia-R78.81  Indication:    Cellulitis and abscess of left leg, Bacteremia associated with                 intravascular line, initial encounter    Patient History:  Pertinent History: HTN, Hyperlipidemia and PVD.    Study Detail: The following Echo studies were performed: 2D, M-Mode, Doppler and                color flow. Technically challenging study due to patient lying in                supine position and body habitus. Definity used as a contrast                agent for endocardial border definition. Total contrast used for                this procedure was 3 mL via IV push.       PHYSICIAN INTERPRETATION:  Left Ventricle: The left ventricular systolic function is normal, with an estimated ejection fraction of 55%. There are no regional wall motion abnormalities. The left ventricular cavity size is normal. The left ventricular septal wall thickness is mildly increased. Spectral Doppler shows an impaired relaxation pattern of left ventricular diastolic filling.  Left Atrium: The left atrium is normal in size.  Right Ventricle: The right ventricle is normal in size. There is normal right ventricular global systolic function.  Right Atrium: The right atrium is normal in size.  Aortic Valve: The aortic valve is trileaflet. There is mild aortic valve cusp  calcification. There is mild aortic valve thickening. There is no evidence of aortic valve regurgitation. The peak instantaneous gradient of the aortic valve is 9.4 mmHg. The mean gradient of the aortic valve is 5.0 mmHg.  Mitral Valve: The mitral valve is mildly thickened. There is no evidence of mitral valve regurgitation. There is some anterior leaflet calcification and papillary calcification.  Tricuspid Valve: The tricuspid valve is structurally normal. There is trace tricuspid regurgitation. The Doppler estimated RVSP is within normal limits at 22.2 mmHg.  Pulmonic Valve: The pulmonic valve is structurally normal. There is no indication of pulmonic valve regurgitation.  Pericardium: There is no pericardial effusion noted.  Aorta: The aortic root is normal.  In comparison to the previous echocardiogram(s): Compared with study from 6/22/2023, again echocardiographic views are limited. There is no significant difference in valvular anatomy and function to suggest active endocarditis.       CONCLUSIONS:   1. Left ventricular systolic function is normal with a 55% estimated ejection fraction.   2. Spectral Doppler shows an impaired relaxation pattern of left ventricular diastolic filling.   3. No evidence of vegetations present.   4. RVSP within normal limits.   5. Poorly visualized anatomical structures due to suboptimal image quality.   6. Compared with study from 6/22/2023, again echocardiographic views are limited. There is no significant difference in valvular anatomy and function to suggest active endocarditis.    QUANTITATIVE DATA SUMMARY:  2D MEASUREMENTS:                           Normal Ranges:  LAs:           3.10 cm   (2.7-4.0cm)  IVSd:          1.20 cm   (0.6-1.1cm)  LVPWd:         1.00 cm   (0.6-1.1cm)  LVIDd:         3.90 cm   (3.9-5.9cm)  LVIDs:         2.90 cm  LV Mass Index: 72.0 g/m2  LV % FS        25.6 %    LA VOLUME:                                Normal Ranges:  LA Vol A4C:        32.9 ml     (22+/-6mL/m2)  LA Vol A2C:        49.3 ml  LA Vol BP:         40.4 ml  LA Vol Index A4C:  16.9ml/m2  LA Vol Index A2C:  25.4 ml/m2  LA Vol Index BP:   20.8 ml/m2  LA Area A4C:       13.1 cm2  LA Area A2C:       16.0 cm2  LA Major Axis A4C: 4.4 cm  LA Major Axis A2C: 4.4 cm  LA Volume Index:   20.8 ml/m2    RA VOLUME BY A/L METHOD:                       Normal Ranges:  RA Area A4C: 9.9 cm2    AORTA MEASUREMENTS:                       Normal Ranges:  Ao Sinus, d: 2.41 cm (2.1-3.5cm)  Ao STJ, d:   2.49 cm (1.7-3.4cm)  Asc Ao, d:   2.40 cm (2.1-3.4cm)    LV SYSTOLIC FUNCTION BY 2D PLANIMETRY (MOD):                      Normal Ranges:  EF-A4C View: 54.1 % (>=55%)  EF-A2C View: 53.7 %  EF-Biplane:  54.1 %    LV DIASTOLIC FUNCTION:                                Normal Ranges:  MV Peak E:        0.45 m/s    (0.7-1.2 m/s)  MV Peak A:        0.69 m/s    (0.42-0.7 m/s)  E/A Ratio:        0.65        (1.0-2.2)  MV e'             0.06 m/s    (>8.0)  MV lateral e'     0.07 m/s  MV medial e'      0.06 m/s  MV A Dur:         108.00 msec  E/e' Ratio:       7.48        (<8.0)  PulmV Sys Nilesh:    41.10 cm/s  PulmV Munroe Nilesh:   24.20 cm/s  PulmV S/D Nilesh:    1.70  PulmV A Revs Nilesh: 18.20 cm/s  PulmV A Revs Dur: 111.00 msec    MITRAL VALVE:                  Normal Ranges:  MV DT: 106 msec (150-240msec)    AORTIC VALVE:                                    Normal Ranges:  AoV Vmax:                1.53 m/s (<=1.7m/s)  AoV Peak P.4 mmHg (<20mmHg)  AoV Mean P.0 mmHg (1.7-11.5mmHg)  LVOT Max Nilesh:            0.93 m/s (<=1.1m/s)  AoV VTI:                 22.80 cm (18-25cm)  LVOT VTI:                16.10 cm  LVOT Diameter:           2.10 cm  (1.8-2.4cm)  AoV Area, VTI:           2.45 cm2 (2.5-5.5cm2)  AoV Area,Vmax:           2.11 cm2 (2.5-4.5cm2)  AoV Dimensionless Index: 0.71       RIGHT VENTRICLE:  RV Basal 2.73 cm  RV Mid   2.09 cm  RV Major 6.9 cm  TAPSE:   17.9 mm    TRICUSPID VALVE/RVSP:                               Normal Ranges:  Peak TR Velocity: 2.19 m/s  Est. RA Pressure: 3 mmHg  RV Syst Pressure: 22.2 mmHg (< 30mmHg)  IVC Diam:         0.96 cm    PULMONIC VALVE:                          Normal Ranges:  PV Accel Time: 90 msec  (>120ms)  PV Max Nilesh:    1.0 m/s  (0.6-0.9m/s)  PV Max P.2 mmHg    Pulmonary Veins:  PulmV A Revs Dur: 111.00 msec  PulmV A Revs Nilesh: 18.20 cm/s  PulmV Munroe Nilesh:   24.20 cm/s  PulmV S/D Nilesh:    1.70  PulmV Sys Nilesh:    41.10 cm/s       [id number] Mik Grissom MD  Electronically signed on 2023 at 12:37:35 PM       ** Final **      PHYSICAL EXAM  NEUROLOGICAL: No abnormal movements, no changes from before  HEENT: Head atraumatic, perrl,eomi, no oral lesions, no ear rash   NECK: Supple, no ln, jvp flat, thyroid palp  HEART: S1, S2, no added sound  LUNGS: CTAB, no crackles, no rales, no wheezing, no dullness to percussion, sym exp  EXTREMITIES: no edema  ABDOMEN: Soft, nontender, bowel sound positive, no organomegaly  SKIN: No change  EYES: No changes, perrl, eomi,   ENT: No change    JOINT: No change  Relevant Results               Assessment/Plan                  Principal Problem:    Cellulitis and abscess of left leg  CLABSI  Esrd  Anemia chr disease  T2dm  adjust insulin     PLAN  Dc back to [facility] in am     t35                Mauricio Estrada MD

## 2023-11-16 NOTE — CARE PLAN
The patient's goals for the shift include to be free from pain    The clinical goals for the shift include pt to remain afebrile

## 2023-11-16 NOTE — PROGRESS NOTES
"  INFECTIOUS DISEASE DAILY PROGRESS NOTE    SUBJECTIVE:    No overnight events. No new complaints. Afebrile. No rash/itching/diarrhea.    He had lost pIV access yesterday. Has been doing better and only MRSA mainly isolated so I had stopped Zosyn.    OBJECTIVE:  VITALS (Last 24 Hours)  /59 (BP Location: Left arm, Patient Position: Lying)   Pulse 96   Temp 36.8 °C (98.2 °F) (Oral)   Resp 18   Ht 1.753 m (5' 9.02\")   Wt 79.4 kg (175 lb 0.7 oz)   SpO2 96%   BMI 25.84 kg/m²     PHYSICAL EXAM:  Gen - NAD  RUE - s/p hand amputation  LLE - s/p AKA with wound vac on stump  RLE - fem HD cath present  Skin - no rash     ABX: IV Vanc/Zosyn     LABS:  Lab Results   Component Value Date    WBC 8.0 11/16/2023    HGB 9.0 (L) 11/16/2023    HCT 29.5 (L) 11/16/2023    MCV 88 11/16/2023     11/16/2023     Lab Results   Component Value Date    GLUCOSE 59 (L) 11/16/2023    CALCIUM 8.6 11/16/2023     11/16/2023    K 3.7 11/16/2023    CO2 27 11/16/2023    CL 99 11/16/2023    BUN 17 11/16/2023    CREATININE 4.39 (H) 11/16/2023     Results from last 72 hours   Lab Units 11/16/23  0553   ALBUMIN g/dL 2.4*     Estimated Creatinine Clearance: 22.6 mL/min (A) (by C-G formula based on SCr of 4.39 mg/dL (H)).      ASSESSMENT/PLAN:     CLABSI (right fem HD cath POA) due to MRSA and Strep dysgalactiae/canis - acute life threatening condition needing IV abx. HD cath exchanged over wire 11/13. Repeat blood cx 11/14 NGTD. TTE is without signs of endocarditis.  LLE AKA Stump Infection with Abscess - would suspect this is an abscess given bacteremia, source may be this and the line secondarily seeded  ESRD on HD     IV Vancomycin - plan with HD through 12/11/23 for 4 weeks total therapy for complicated MRSA bacteremia.     Vascular surgery consult pending still to assess the fluid collection in the left AKA stump.     Monitoring for adverse effects of abx such as rash/itching/diarrhea.     Will follow. Thanks!    Mat Jauregui, " MD  [facility]  Office #[phone]

## 2023-11-16 NOTE — CARE PLAN
The patient's goals for the shift include to be free from pain    The clinical goals for the shift include to remain afebrile throughout shift

## 2023-11-16 NOTE — DISCHARGE SUMMARY
Discharge Diagnosis  Cellulitis and abscess of left leg    Issues Requiring Follow-Up  Vascualarsurgery  [facility] IV vancomycin and ID follow up     Discharge Meds     Your medication list        START taking these medications        Instructions Last Dose Given Next Dose Due   acetaminophen 325 mg tablet  Commonly known as: Tylenol      Take 2 tablets (650 mg) by mouth every 4 hours if needed for moderate pain (4 - 6).       insulin glargine 100 unit/mL injection  Commonly known as: Lantus      Inject 10 Units under the skin once daily at bedtime. Take as directed per insulin instructions.       insulin lispro 100 unit/mL injection  Commonly known as: HumaLOG  Start taking on: November 17, 2023      Inject 0-0.15 mL (0-15 Units) under the skin 3 times a day with meals. Take as directed per insulin instructions. Do not start before November 17, 2023.       melatonin 3 mg tablet      Take 1 tablet (3 mg) by mouth as needed at bedtime for sleep for up to 15 doses.       ondansetron 4 mg tablet  Commonly known as: Zofran      Take 1 tablet (4 mg) by mouth every 8 hours if needed for nausea for up to 26 days.       oxyCODONE-acetaminophen 5-325 mg tablet  Commonly known as: Percocet      Take 1 tablet by mouth every 6 hours if needed for severe pain (7 - 10).       pantoprazole 40 mg EC tablet  Commonly known as: ProtoNix  Start taking on: November 17, 2023      Take 1 tablet (40 mg) by mouth once daily in the morning. Take before meals. Do not crush, chew, or split. Do not start before November 17, 2023.              CONTINUE taking these medications        Instructions Last Dose Given Next Dose Due   sevelamer carbonate 800 mg tablet  Commonly known as: Renvela           simethicone 80 mg chewable tablet  Commonly known as: Mylicon                     Where to Get Your Medications        These medications were sent to [facility] [place], [place] - [address]  [address] [facility] [address]       Phone: [phone]   acetaminophen 325 mg tablet       You can get these medications from any pharmacy    Bring a paper prescription for each of these medications  oxyCODONE-acetaminophen 5-325 mg tablet       Information about where to get these medications is not yet available    Ask your nurse or doctor about these medications  insulin glargine 100 unit/mL injection  insulin lispro 100 unit/mL injection  melatonin 3 mg tablet  ondansetron 4 mg tablet  pantoprazole 40 mg EC tablet         Test Results Pending At Discharge  Pending Labs       Order Current Status    Blood Culture Preliminary result    Blood Culture Preliminary result            Hospital Course  Edwar Hemphill is a 38 y.o. male with a past medical history of end-stage renal disease on hemodialysis via right femoral TDC who resides at [facility] [place] [facility]. 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  He later had stump debridement on 7/27 with wound VAC placement.  He had polymicrobial infection including resistant E coli, Acenetobacter baumannii and Enterobacter fecalis. He was admitted in September with positive blood cultures growing Acenetobacter baumannii and Stenotrophomonas. He went to IR for dialysis line exchange.  He comes in now due to not feeling well. CT imaging of the left limp noted a 6.3 cm fluid collection but no evidence of soft tissue gas. He was started on antimicrobial coverage.   Cellulitis and abscess of left leg  ID Plan reviewed :  CLABSI (right fem HD cath POA) due to MRSA and Strep dysgalactiae/canis - acute life threatening condition needing IV abx. HD cath exchanged over wire 11/13. Repeat blood cx 11/14  NGTD. TTE is without signs of endocarditis.  LLE AKA Stump Infection with Abscess - would suspect this is an abscess given bacteremia, source may be this and the line secondarily seeded  ESRD on HD     IV Vancomycin - plan with HD through 12/11/23 for 4 weeks total therapy for complicated MRSA bacteremia.     Discussed with vascular surgery and concern for abscess to be treated recommendation to be transferred to main [place] for possible abscess area revision of stump site and or involvement of joint/bone issues.  Upon discussion with patient he refused to be transferred, feels that wound is getting better with VAC and medical treatment, explained that there is concern of abscess involvement, he is non toxice appearing, feels better and wants to be discharged back to [facility] and follow up as an outpatient.    Discussed with Dr Aburto and ID and nephrology who made arrangements for IV vancomycin to be given at his dialysis center as recommended.     Vascular surgery follow up requested     Outpatient Follow-Up  No future appointments.      Zully Torres, APRN-CNP

## 2023-11-16 NOTE — SIGNIFICANT EVENT
Attempted to see patient today, was notified by internal medicine provider that placed consult that cardiology was not needed d/t no cardiac concern> consult canceled.  Was going to evaluate patient on behalf of vascular surgery; was informed that  had spoken with vascular surgeon Dr. Joyce Jauregui who reportedly rec's patient to be transferred to [facility] for further vascular evaluation and management of probable left stump abscess.  Will defer transfer and further plan of care to internal medicine.

## 2023-11-16 NOTE — PROGRESS NOTES
11/16/2023 5:06 PM Mother Shanthi Hemphill [phone] notified that patient will be discharged this evening. Lianna HARRIS

## 2023-11-16 NOTE — PROGRESS NOTES
"Edwar Hemphill is a 38 y.o. male on day 6 of admission presenting with Cellulitis and abscess of left leg.    Subjective   Dialysis following  ID following - note waiting for concern of abscess of left stump as possible infectious source   Vascular surgery reached out to for any thoughts  PVD/Cardiology on board - reached out   IV antibiotics until 12/11/23  Wound care note reviewed in chart with pictures       Objective     Physical Exam  Constitutional:       Appearance: Normal appearance.   Cardiovascular:      Rate and Rhythm: Normal rate and regular rhythm.   Abdominal:      Palpations: Abdomen is soft.   Skin:     General: Skin is warm and dry.      Comments: Right heel   Left stump with VAC    Neurological:      General: No focal deficit present.      Mental Status: He is alert and oriented to person, place, and time.   Psychiatric:         Mood and Affect: Mood normal.         Behavior: Behavior normal.         Last Recorded Vitals  Blood pressure 131/89, pulse 100, temperature 36.7 °C (98.1 °F), temperature source Temporal, resp. rate 17, height 1.753 m (5' 9.02\"), weight 79.4 kg (175 lb 0.7 oz), SpO2 98 %.  Intake/Output last 3 Shifts:  I/O last 3 completed shifts:  In: 1850 (23.3 mL/kg) [P.O.:600; I.V.:800 (10.1 mL/kg); Other:400; IV Piggyback:50]  Out: 3000 (37.8 mL/kg) [Other:3000]  Weight: 79.4 kg     Relevant Results  Scheduled medications  epoetin tyson or biosimilar, 8,000 Units, subcutaneous, Once per day on Mon Wed Fri  insulin glargine, 10 Units, subcutaneous, Nightly  insulin lispro, 0-15 Units, subcutaneous, TID with meals  pantoprazole, 40 mg, oral, Daily before breakfast  perflutren lipid microspheres, 0.5-10 mL of dilution, intravenous, Once in imaging  sevelamer carbonate, 1,600 mg, oral, TID with meals  sulfur hexafluoride microsphr, 2 mL, intravenous, Once in imaging      Continuous medications     PRN medications  PRN medications: acetaminophen **OR** [DISCONTINUED] acetaminophen **OR** " [DISCONTINUED] acetaminophen, dextrose 10 % in water (D10W), dextrose, fentaNYL PF, glucagon, heparin, HYDROmorphone, HYDROmorphone, lidocaine PF, melatonin, ondansetron **OR** ondansetron, simethicone, vancomycin     Results for orders placed or performed during the hospital encounter of 11/10/23 (from the past 24 hour(s))   POCT GLUCOSE   Result Value Ref Range    POCT Glucose 233 (H) 74 - 99 mg/dL   POCT GLUCOSE   Result Value Ref Range    POCT Glucose 307 (H) 74 - 99 mg/dL   POCT GLUCOSE   Result Value Ref Range    POCT Glucose 248 (H) 74 - 99 mg/dL   CBC   Result Value Ref Range    WBC 8.0 4.4 - 11.3 x10*3/uL    nRBC 0.0 0.0 - 0.0 /100 WBCs    RBC 3.36 (L) 4.50 - 5.90 x10*6/uL    Hemoglobin 9.0 (L) 13.5 - 17.5 g/dL    Hematocrit 29.5 (L) 41.0 - 52.0 %    MCV 88 80 - 100 fL    MCH 26.8 26.0 - 34.0 pg    MCHC 30.5 (L) 32.0 - 36.0 g/dL    RDW 18.7 (H) 11.5 - 14.5 %    Platelets 333 150 - 450 x10*3/uL   Renal function panel   Result Value Ref Range    Glucose 59 (L) 74 - 99 mg/dL    Sodium 136 136 - 145 mmol/L    Potassium 3.7 3.5 - 5.3 mmol/L    Chloride 99 98 - 107 mmol/L    Bicarbonate 27 21 - 32 mmol/L    Anion Gap 14 10 - 20 mmol/L    Urea Nitrogen 17 6 - 23 mg/dL    Creatinine 4.39 (H) 0.50 - 1.30 mg/dL    eGFR 17 (L) >60 mL/min/1.73m*2    Calcium 8.6 8.6 - 10.3 mg/dL    Phosphorus 4.2 2.5 - 4.9 mg/dL    Albumin 2.4 (L) 3.4 - 5.0 g/dL   POCT GLUCOSE   Result Value Ref Range    POCT Glucose 62 (L) 74 - 99 mg/dL   POCT GLUCOSE   Result Value Ref Range    POCT Glucose 73 (L) 74 - 99 mg/dL   POCT GLUCOSE   Result Value Ref Range    POCT Glucose 139 (H) 74 - 99 mg/dL        Assessment/Plan   Principal Problem:    Cellulitis and abscess of left leg  ID Plan reviewed :  CLABSI (right fem HD cath POA) due to MRSA and Strep dysgalactiae/canis - acute life threatening condition needing IV abx. HD cath exchanged over wire 11/13. Repeat blood cx 11/14 NGTD. TTE is without signs of endocarditis.  LLE AKA Stump Infection  with Abscess - would suspect this is an abscess given bacteremia, source may be this and the line secondarily seeded  ESRD on HD     IV Vancomycin - plan with HD through 12/11/23 for 4 weeks total therapy for complicated MRSA bacteremia.     Vascular surgery consult pending still to assess the fluid collection in the left AKA stump.    PVD/Cardiology Dr Dennis consulted     ESRD Nephrology following for dialysis on Friday     Zully Torres, APRN-CNP

## 2023-11-16 NOTE — PROGRESS NOTES
Edwar Hemphill is a 38 y.o. male on day 6 of admission presenting with Cellulitis and abscess of left leg.      Subjective   Pt doing ok   No sob, or chest pain        Objective     Last Recorded Vitals  /89 (Patient Position: Lying)   Pulse 100   Temp 36.7 °C (98.1 °F) (Temporal)   Resp 17   Wt 79.4 kg (175 lb 0.7 oz)   SpO2 98%   Intake/Output last 3 Shifts:    Intake/Output Summary (Last 24 hours) at 11/16/2023 1605  Last data filed at 11/15/2023 1814  Gross per 24 hour   Intake 240 ml   Output --   Net 240 ml       Admission Weight  Weight: 79.4 kg (175 lb 0.7 oz) (11/10/23 1612)    Daily Weight  11/10/23 : 79.4 kg (175 lb 0.7 oz)    Image Results  IR CVC exchange  Narrative: Interpreted By:  Ronaldo Dempsey,   STUDY:  IR CVC EXCHANGE; ;  11/13/2023 3:22 pm      FLUOROSCOPICALLY GUIDED RIGHT COMMON FEMORAL VEIN TUNNELED  HEMODIALYSIS CATHETER EXCHANGE      INDICATION:  Signs/Symptoms:line exchange right fem HD cath given bacteremia.  38-year-old male with end-stage renal disease, bacteremia. Request  for guidewire exchange of right groin tunneled hemodialysis catheter.      COMPARISON:  Chest radiograph 11/10/2023, fluoroscopic images from prior catheter  exchange 09/15/2023      ACCESSION NUMBER(S):  [id number]      ORDERING CLINICIAN:  ALKA VIEYRA      TECHNIQUE:      ATTENDING : Ronaldo Dempsey M.D.      TECHNICAL DESCRIPTION/FINDINGS: The procedure, including all risks,  benefits and alternatives were explained to the patient in detail.  All questions were answered and written informed consent was obtained.      The patient was positioned supine on the fluoroscopy table. A  time-out was performed.      The right groin and indwelling tunneled hemodialysis catheter were  prepped and draped in usual sterile fashion. A  fluoroscopic  images than demonstrating grossly unchanged positioning of the  catheter terminating in the mid right atrium.      1% lidocaine was administered  around the catheter for local  anesthesia. Blunt dissection was performed to free the subcutaneous  catheter cuff. An 035 stiff Glidewire was advanced through the  catheter into the superior vena cava and the old catheter was  removed. Over the guidewire, a new 14.5 Kenyan by 50 cm tip to cuff  dual lumen hemodialysis catheter was then placed. A completion  fluoroscopic image demonstrates the catheter tip terminating in the  mid right atrium.      Catheter lumens easily aspirated and flushed and were locked with a  concentrated heparinized solution (1000 units/cc). The catheter hub  was sutured to the skin with 2 0 Prolene stay suture. A sterile  dressing was applied.      SEDATION/MEDICATIONS: Continuous cardiopulmonary monitoring was  performed by a radiology nurse for the duration of the procedure. No  conscious sedation was administered. Procedural duration 15 minutes.  10 cc 1% Lidocaine was administered subcutaneously for local  anesthesia. SPECIMENS: None.  ESTIMATED BLOOD LOSS:  5 cc  FLOUROSCOPY:  0.4 minutes; DAP  615 mGy-cm*2; Air Kerma  27.84 mGy  CONTRAST: None.      FINDINGS:  Test      Impression: Fluoroscopically guided exchange of right groin tunneled hemodialysis  catheter. The newly exchange catheter is ready for immediate use.          MACRO:  None      Signed by: Ronaldo Dempsey 11/14/2023 9:10 PM  Dictation workstation:   [id number]  Transthoracic Echo (TTE) [facility], [address] [place] [address]               Tel [phone] and Fax [phone]    TRANSTHORACIC ECHOCARDIOGRAM REPORT       Patient Name:      XIOMARA Maya Physician:    [id number] Mik Grissom MD  Study Date:        11/14/2023           Ordering Provider:    [id number] NOEL VASQUEZ  MRN/PID:           [id number]             Fellow:  Accession#:         [id number]         Nurse:  Date of Birth/Age: 1985 / 38 years Sonographer:          Sonja Gonzalez RDCS  Gender:            M                    Additional Staff:  Height:            175.00 cm            Admit Date:           11/10/2023  Weight:            79.00 kg             Admission Status:     Inpatient -                                                                Routine  BSA:               1.95 m2              Encounter#:           [id number]                                          Department Location:  [facility] Telemetry  Blood Pressure: 126 /78 mmHg    Study Type:    TRANSTHORACIC ECHO (TTE) COMPLETE  Diagnosis/ICD: Bacteremia-R78.81  Indication:    Cellulitis and abscess of left leg, Bacteremia associated with                 intravascular line, initial encounter    Patient History:  Pertinent History: HTN, Hyperlipidemia and PVD.    Study Detail: The following Echo studies were performed: 2D, M-Mode, Doppler and                color flow. Technically challenging study due to patient lying in                supine position and body habitus. Definity used as a contrast                agent for endocardial border definition. Total contrast used for                this procedure was 3 mL via IV push.       PHYSICIAN INTERPRETATION:  Left Ventricle: The left ventricular systolic function is normal, with an estimated ejection fraction of 55%. There are no regional wall motion abnormalities. The left ventricular cavity size is normal. The left ventricular septal wall thickness is mildly increased. Spectral Doppler shows an impaired relaxation pattern of left ventricular diastolic filling.  Left Atrium: The left atrium is normal in size.  Right Ventricle: The right ventricle is normal in size. There is normal right ventricular global systolic function.  Right Atrium: The right atrium is normal in size.  Aortic Valve: The aortic valve is  trileaflet. There is mild aortic valve cusp calcification. There is mild aortic valve thickening. There is no evidence of aortic valve regurgitation. The peak instantaneous gradient of the aortic valve is 9.4 mmHg. The mean gradient of the aortic valve is 5.0 mmHg.  Mitral Valve: The mitral valve is mildly thickened. There is no evidence of mitral valve regurgitation. There is some anterior leaflet calcification and papillary calcification.  Tricuspid Valve: The tricuspid valve is structurally normal. There is trace tricuspid regurgitation. The Doppler estimated RVSP is within normal limits at 22.2 mmHg.  Pulmonic Valve: The pulmonic valve is structurally normal. There is no indication of pulmonic valve regurgitation.  Pericardium: There is no pericardial effusion noted.  Aorta: The aortic root is normal.  In comparison to the previous echocardiogram(s): Compared with study from 6/22/2023, again echocardiographic views are limited. There is no significant difference in valvular anatomy and function to suggest active endocarditis.       CONCLUSIONS:   1. Left ventricular systolic function is normal with a 55% estimated ejection fraction.   2. Spectral Doppler shows an impaired relaxation pattern of left ventricular diastolic filling.   3. No evidence of vegetations present.   4. RVSP within normal limits.   5. Poorly visualized anatomical structures due to suboptimal image quality.   6. Compared with study from 6/22/2023, again echocardiographic views are limited. There is no significant difference in valvular anatomy and function to suggest active endocarditis.    QUANTITATIVE DATA SUMMARY:  2D MEASUREMENTS:                           Normal Ranges:  LAs:           3.10 cm   (2.7-4.0cm)  IVSd:          1.20 cm   (0.6-1.1cm)  LVPWd:         1.00 cm   (0.6-1.1cm)  LVIDd:         3.90 cm   (3.9-5.9cm)  LVIDs:         2.90 cm  LV Mass Index: 72.0 g/m2  LV % FS        25.6 %    LA VOLUME:                                 Normal Ranges:  LA Vol A4C:        32.9 ml    (22+/-6mL/m2)  LA Vol A2C:        49.3 ml  LA Vol BP:         40.4 ml  LA Vol Index A4C:  16.9ml/m2  LA Vol Index A2C:  25.4 ml/m2  LA Vol Index BP:   20.8 ml/m2  LA Area A4C:       13.1 cm2  LA Area A2C:       16.0 cm2  LA Major Axis A4C: 4.4 cm  LA Major Axis A2C: 4.4 cm  LA Volume Index:   20.8 ml/m2    RA VOLUME BY A/L METHOD:                       Normal Ranges:  RA Area A4C: 9.9 cm2    AORTA MEASUREMENTS:                       Normal Ranges:  Ao Sinus, d: 2.41 cm (2.1-3.5cm)  Ao STJ, d:   2.49 cm (1.7-3.4cm)  Asc Ao, d:   2.40 cm (2.1-3.4cm)    LV SYSTOLIC FUNCTION BY 2D PLANIMETRY (MOD):                      Normal Ranges:  EF-A4C View: 54.1 % (>=55%)  EF-A2C View: 53.7 %  EF-Biplane:  54.1 %    LV DIASTOLIC FUNCTION:                                Normal Ranges:  MV Peak E:        0.45 m/s    (0.7-1.2 m/s)  MV Peak A:        0.69 m/s    (0.42-0.7 m/s)  E/A Ratio:        0.65        (1.0-2.2)  MV e'             0.06 m/s    (>8.0)  MV lateral e'     0.07 m/s  MV medial e'      0.06 m/s  MV A Dur:         108.00 msec  E/e' Ratio:       7.48        (<8.0)  PulmV Sys Nilesh:    41.10 cm/s  PulmV Munroe Nilesh:   24.20 cm/s  PulmV S/D Nilesh:    1.70  PulmV A Revs Nilesh: 18.20 cm/s  PulmV A Revs Dur: 111.00 msec    MITRAL VALVE:                  Normal Ranges:  MV DT: 106 msec (150-240msec)    AORTIC VALVE:                                    Normal Ranges:  AoV Vmax:                1.53 m/s (<=1.7m/s)  AoV Peak P.4 mmHg (<20mmHg)  AoV Mean P.0 mmHg (1.7-11.5mmHg)  LVOT Max Nilesh:            0.93 m/s (<=1.1m/s)  AoV VTI:                 22.80 cm (18-25cm)  LVOT VTI:                16.10 cm  LVOT Diameter:           2.10 cm  (1.8-2.4cm)  AoV Area, VTI:           2.45 cm2 (2.5-5.5cm2)  AoV Area,Vmax:           2.11 cm2 (2.5-4.5cm2)  AoV Dimensionless Index: 0.71       RIGHT VENTRICLE:  RV Basal 2.73 cm  RV Mid   2.09 cm  RV Major 6.9 cm  TAPSE:   17.9  mm    TRICUSPID VALVE/RVSP:                              Normal Ranges:  Peak TR Velocity: 2.19 m/s  Est. RA Pressure: 3 mmHg  RV Syst Pressure: 22.2 mmHg (< 30mmHg)  IVC Diam:         0.96 cm    PULMONIC VALVE:                          Normal Ranges:  PV Accel Time: 90 msec  (>120ms)  PV Max Nilesh:    1.0 m/s  (0.6-0.9m/s)  PV Max P.2 mmHg    Pulmonary Veins:  PulmV A Revs Dur: 111.00 msec  PulmV A Revs Nilesh: 18.20 cm/s  PulmV Munroe Nilesh:   24.20 cm/s  PulmV S/D Nilesh:    1.70  PulmV Sys Nilesh:    41.10 cm/s       [id number] Mik Grissom MD  Electronically signed on 2023 at 12:37:35 PM       ** Final **      PHYSICAL EXAM  NEUROLOGICAL: No abnormal movements, no changes from before  HEENT: Head atraumatic, perrl,eomi, no oral lesions, no ear rash   NECK: Supple, no ln, jvp flat, thyroid palp  HEART: S1, S2, no added sound  LUNGS: CTAB, no crackles, no rales, no wheezing, no dullness to percussion, sym exp  EXTREMITIES: no edema  ABDOMEN: Soft, nontender, bowel sound positive, no organomegaly  SKIN: No change  EYES: No changes, perrl, eomi,   ENT: No change    JOINT: No change  Relevant Results               Assessment/Plan        Principal Problem:    Cellulitis and abscess of left leg    Esrd  Anemia chr disease  T2dm  Pvd    PLAN  Pt refuses to be transferred to [facility] to be seen and evaluated by vascular   Would inform ID and plan for him returning to [place] [id number]            Mauricio Estrada MD

## 2023-11-17 VITALS
HEIGHT: 69 IN | SYSTOLIC BLOOD PRESSURE: 120 MMHG | BODY MASS INDEX: 25.93 KG/M2 | HEART RATE: 102 BPM | WEIGHT: 175.04 LBS | OXYGEN SATURATION: 95 % | RESPIRATION RATE: 18 BRPM | DIASTOLIC BLOOD PRESSURE: 78 MMHG | TEMPERATURE: 98.2 F

## 2023-11-17 NOTE — PROGRESS NOTES
Chart Review:   Adm 11/10/23 ANKIT Pelaez 11/16/23 [facility]ginn[facility]  Dx: Cellulitis left leg  Wendy Feldman RN

## 2023-11-18 LAB
BACTERIA BLD CULT: NORMAL
BACTERIA BLD CULT: NORMAL

## 2023-12-11 ENCOUNTER — PATIENT OUTREACH (OUTPATIENT)
Dept: PRIMARY CARE | Facility: CLINIC | Age: 38
End: 2023-12-11
Payer: MEDICARE

## 2023-12-11 DIAGNOSIS — E10.9 TYPE 1 DIABETES MELLITUS WITHOUT COMPLICATION (MULTI): ICD-10-CM

## 2023-12-11 DIAGNOSIS — I10 ESSENTIAL HYPERTENSION, BENIGN: ICD-10-CM

## 2023-12-11 NOTE — PROGRESS NOTES
Chart Review:   Adm 11/10/23 [facility] 11/16/23 [facility]  Dx: Cellulitis left leg  Wendy Feldman RN              Progress Notes  Wendy Feldman RN (Coordinator)  Primary Care  [facility] on 9/14/23  Dx: Altered mental status, unspecified, Hyperkalemia  DC to [facility] on 9/21/23- still at [facility] per Mr. Hemphill.  Wendy Feldman, RN

## 2024-01-09 ENCOUNTER — PATIENT OUTREACH (OUTPATIENT)
Dept: PRIMARY CARE | Facility: CLINIC | Age: 39
End: 2024-01-09
Payer: COMMERCIAL

## 2024-01-09 DIAGNOSIS — I10 ESSENTIAL HYPERTENSION, BENIGN: ICD-10-CM

## 2024-01-09 DIAGNOSIS — E10.9 TYPE 1 DIABETES MELLITUS WITHOUT COMPLICATION (MULTI): ICD-10-CM

## 2024-01-09 NOTE — PROGRESS NOTES
Attempt made to reach Mr. Hemphill at his listed contact number for ccm outreach.  No contact after several attempts.  Will send letter to Mr. Hemphill and dwaine from ccm program.  Wendy Feldman RN

## 2024-01-14 ENCOUNTER — APPOINTMENT (OUTPATIENT)
Dept: RADIOLOGY | Facility: HOSPITAL | Age: 39
DRG: 314 | End: 2024-01-14
Payer: COMMERCIAL

## 2024-01-14 ENCOUNTER — APPOINTMENT (OUTPATIENT)
Dept: CARDIOLOGY | Facility: HOSPITAL | Age: 39
DRG: 314 | End: 2024-01-14
Payer: COMMERCIAL

## 2024-01-14 ENCOUNTER — HOSPITAL ENCOUNTER (INPATIENT)
Facility: HOSPITAL | Age: 39
LOS: 8 days | Discharge: NURSING, REHAB OR LONG-TERM CARE | DRG: 314 | End: 2024-01-22
Attending: EMERGENCY MEDICINE | Admitting: ANESTHESIOLOGY
Payer: COMMERCIAL

## 2024-01-14 DIAGNOSIS — N18.6 ESRD (END STAGE RENAL DISEASE) ON DIALYSIS (MULTI): ICD-10-CM

## 2024-01-14 DIAGNOSIS — R65.21 SEPTIC SHOCK (MULTI): Primary | ICD-10-CM

## 2024-01-14 DIAGNOSIS — A41.9 SEPTIC SHOCK (MULTI): Primary | ICD-10-CM

## 2024-01-14 DIAGNOSIS — Z99.2 ESRD (END STAGE RENAL DISEASE) ON DIALYSIS (MULTI): ICD-10-CM

## 2024-01-14 DIAGNOSIS — K21.9 GASTROESOPHAGEAL REFLUX DISEASE, UNSPECIFIED WHETHER ESOPHAGITIS PRESENT: ICD-10-CM

## 2024-01-14 DIAGNOSIS — R78.81 BACTEREMIA: ICD-10-CM

## 2024-01-14 DIAGNOSIS — E10.69 TYPE 1 DIABETES MELLITUS WITH OTHER SPECIFIED COMPLICATION (MULTI): ICD-10-CM

## 2024-01-14 LAB
ALBUMIN SERPL BCP-MCNC: 3.6 G/DL (ref 3.4–5)
ALP SERPL-CCNC: 183 U/L (ref 33–120)
ALT SERPL W P-5'-P-CCNC: 10 U/L (ref 10–52)
ANION GAP BLDV CALCULATED.4IONS-SCNC: 13 MMOL/L (ref 10–25)
ANION GAP SERPL CALC-SCNC: 17 MMOL/L (ref 10–20)
AST SERPL W P-5'-P-CCNC: 13 U/L (ref 9–39)
BASE EXCESS BLDV CALC-SCNC: 3.9 MMOL/L (ref -2–3)
BASOPHILS # BLD AUTO: 0.05 X10*3/UL (ref 0–0.1)
BASOPHILS NFR BLD AUTO: 0.5 %
BILIRUB SERPL-MCNC: 0.6 MG/DL (ref 0–1.2)
BODY TEMPERATURE: 37 DEGREES CELSIUS
BUN SERPL-MCNC: 27 MG/DL (ref 6–23)
CA-I BLDV-SCNC: 1.21 MMOL/L (ref 1.1–1.33)
CALCIUM SERPL-MCNC: 9.8 MG/DL (ref 8.6–10.3)
CHLORIDE BLDV-SCNC: 94 MMOL/L (ref 98–107)
CHLORIDE SERPL-SCNC: 93 MMOL/L (ref 98–107)
CO2 SERPL-SCNC: 25 MMOL/L (ref 21–32)
CREAT SERPL-MCNC: 7.74 MG/DL (ref 0.5–1.3)
EGFRCR SERPLBLD CKD-EPI 2021: 8 ML/MIN/1.73M*2
EOSINOPHIL # BLD AUTO: 0.11 X10*3/UL (ref 0–0.7)
EOSINOPHIL NFR BLD AUTO: 1 %
ERYTHROCYTE [DISTWIDTH] IN BLOOD BY AUTOMATED COUNT: 21.4 % (ref 11.5–14.5)
FLUAV RNA RESP QL NAA+PROBE: NOT DETECTED
FLUBV RNA RESP QL NAA+PROBE: NOT DETECTED
GLUCOSE BLDV-MCNC: 93 MG/DL (ref 74–99)
GLUCOSE SERPL-MCNC: 85 MG/DL (ref 74–99)
HCO3 BLDV-SCNC: 29.2 MMOL/L (ref 22–26)
HCT VFR BLD AUTO: 35.2 % (ref 41–52)
HCT VFR BLD EST: 34 % (ref 41–52)
HGB BLD-MCNC: 10.7 G/DL (ref 13.5–17.5)
HGB BLDV-MCNC: 11.4 G/DL (ref 13.5–17.5)
HYPOCHROMIA BLD QL SMEAR: NORMAL
IMM GRANULOCYTES # BLD AUTO: 0.04 X10*3/UL (ref 0–0.7)
IMM GRANULOCYTES NFR BLD AUTO: 0.4 % (ref 0–0.9)
INHALED O2 CONCENTRATION: 21 %
LACTATE BLDV-SCNC: 1.7 MMOL/L (ref 0.4–2)
LACTATE BLDV-SCNC: 2.2 MMOL/L (ref 0.4–2)
LACTATE SERPL-SCNC: 1.6 MMOL/L (ref 0.4–2)
LYMPHOCYTES # BLD AUTO: 0.82 X10*3/UL (ref 1.2–4.8)
LYMPHOCYTES NFR BLD AUTO: 7.7 %
MCH RBC QN AUTO: 26.2 PG (ref 26–34)
MCHC RBC AUTO-ENTMCNC: 30.4 G/DL (ref 32–36)
MCV RBC AUTO: 86 FL (ref 80–100)
MONOCYTES # BLD AUTO: 0.54 X10*3/UL (ref 0.1–1)
MONOCYTES NFR BLD AUTO: 5.1 %
NEUTROPHILS # BLD AUTO: 9.12 X10*3/UL (ref 1.2–7.7)
NEUTROPHILS NFR BLD AUTO: 85.3 %
NRBC BLD-RTO: 0 /100 WBCS (ref 0–0)
OVALOCYTES BLD QL SMEAR: NORMAL
OXYHGB MFR BLDV: 25.3 % (ref 45–75)
PCO2 BLDV: 46 MM HG (ref 41–51)
PH BLDV: 7.41 PH (ref 7.33–7.43)
PLATELET # BLD AUTO: 190 X10*3/UL (ref 150–450)
PLATELET CLUMP BLD QL SMEAR: PRESENT
PO2 BLDV: 23 MM HG (ref 35–45)
POTASSIUM BLDV-SCNC: 4.5 MMOL/L (ref 3.5–5.3)
POTASSIUM SERPL-SCNC: 4.4 MMOL/L (ref 3.5–5.3)
PROT SERPL-MCNC: 8.3 G/DL (ref 6.4–8.2)
RBC # BLD AUTO: 4.09 X10*6/UL (ref 4.5–5.9)
RBC MORPH BLD: NORMAL
RSV RNA RESP QL NAA+PROBE: NOT DETECTED
SAO2 % BLDV: 26 % (ref 45–75)
SARS-COV-2 RNA RESP QL NAA+PROBE: NOT DETECTED
SODIUM BLDV-SCNC: 132 MMOL/L (ref 136–145)
SODIUM SERPL-SCNC: 131 MMOL/L (ref 136–145)
TARGETS BLD QL SMEAR: NORMAL
WBC # BLD AUTO: 10.7 X10*3/UL (ref 4.4–11.3)

## 2024-01-14 PROCEDURE — 05JY3ZZ INSPECTION OF UPPER VEIN, PERCUTANEOUS APPROACH: ICD-10-PCS

## 2024-01-14 PROCEDURE — 71046 X-RAY EXAM CHEST 2 VIEWS: CPT

## 2024-01-14 PROCEDURE — 96375 TX/PRO/DX INJ NEW DRUG ADDON: CPT | Mod: 59

## 2024-01-14 PROCEDURE — 96365 THER/PROPH/DIAG IV INF INIT: CPT | Mod: 59

## 2024-01-14 PROCEDURE — 83605 ASSAY OF LACTIC ACID: CPT | Performed by: EMERGENCY MEDICINE

## 2024-01-14 PROCEDURE — 71250 CT THORAX DX C-: CPT | Performed by: RADIOLOGY

## 2024-01-14 PROCEDURE — 74176 CT ABD & PELVIS W/O CONTRAST: CPT

## 2024-01-14 PROCEDURE — 84132 ASSAY OF SERUM POTASSIUM: CPT | Performed by: EMERGENCY MEDICINE

## 2024-01-14 PROCEDURE — 74176 CT ABD & PELVIS W/O CONTRAST: CPT | Performed by: RADIOLOGY

## 2024-01-14 PROCEDURE — 99292 CRITICAL CARE ADDL 30 MIN: CPT | Performed by: ANESTHESIOLOGY

## 2024-01-14 PROCEDURE — 2500000004 HC RX 250 GENERAL PHARMACY W/ HCPCS (ALT 636 FOR OP/ED): Performed by: EMERGENCY MEDICINE

## 2024-01-14 PROCEDURE — 96361 HYDRATE IV INFUSION ADD-ON: CPT | Mod: 59

## 2024-01-14 PROCEDURE — 85025 COMPLETE CBC W/AUTO DIFF WBC: CPT | Performed by: EMERGENCY MEDICINE

## 2024-01-14 PROCEDURE — 93010 ELECTROCARDIOGRAM REPORT: CPT | Performed by: INTERNAL MEDICINE

## 2024-01-14 PROCEDURE — 2500000005 HC RX 250 GENERAL PHARMACY W/O HCPCS

## 2024-01-14 PROCEDURE — 3E033XZ INTRODUCTION OF VASOPRESSOR INTO PERIPHERAL VEIN, PERCUTANEOUS APPROACH: ICD-10-PCS

## 2024-01-14 PROCEDURE — 06HY33Z INSERTION OF INFUSION DEVICE INTO LOWER VEIN, PERCUTANEOUS APPROACH: ICD-10-PCS

## 2024-01-14 PROCEDURE — 36556 INSERT NON-TUNNEL CV CATH: CPT

## 2024-01-14 PROCEDURE — 87040 BLOOD CULTURE FOR BACTERIA: CPT | Mod: AHULAB | Performed by: EMERGENCY MEDICINE

## 2024-01-14 PROCEDURE — 99291 CRITICAL CARE FIRST HOUR: CPT | Performed by: ANESTHESIOLOGY

## 2024-01-14 PROCEDURE — 36415 COLL VENOUS BLD VENIPUNCTURE: CPT | Performed by: EMERGENCY MEDICINE

## 2024-01-14 PROCEDURE — A4217 STERILE WATER/SALINE, 500 ML: HCPCS | Performed by: EMERGENCY MEDICINE

## 2024-01-14 PROCEDURE — 99285 EMERGENCY DEPT VISIT HI MDM: CPT | Performed by: EMERGENCY MEDICINE

## 2024-01-14 PROCEDURE — 96367 TX/PROPH/DG ADDL SEQ IV INF: CPT | Mod: 59

## 2024-01-14 PROCEDURE — 87637 SARSCOV2&INF A&B&RSV AMP PRB: CPT | Performed by: EMERGENCY MEDICINE

## 2024-01-14 PROCEDURE — 71045 X-RAY EXAM CHEST 1 VIEW: CPT | Performed by: RADIOLOGY

## 2024-01-14 PROCEDURE — 76705 ECHO EXAM OF ABDOMEN: CPT | Performed by: RADIOLOGY

## 2024-01-14 PROCEDURE — 2020000001 HC ICU ROOM DAILY

## 2024-01-14 PROCEDURE — 93005 ELECTROCARDIOGRAM TRACING: CPT

## 2024-01-14 PROCEDURE — 76705 ECHO EXAM OF ABDOMEN: CPT

## 2024-01-14 PROCEDURE — 2500000004 HC RX 250 GENERAL PHARMACY W/ HCPCS (ALT 636 FOR OP/ED): Performed by: ANESTHESIOLOGY

## 2024-01-14 RX ORDER — VANCOMYCIN HYDROCHLORIDE 1 G/20ML
INJECTION, POWDER, LYOPHILIZED, FOR SOLUTION INTRAVENOUS DAILY PRN
Status: DISCONTINUED | OUTPATIENT
Start: 2024-01-14 | End: 2024-01-23 | Stop reason: HOSPADM

## 2024-01-14 RX ORDER — SODIUM CHLORIDE 9 MG/ML
100 INJECTION, SOLUTION INTRAVENOUS CONTINUOUS
Status: DISCONTINUED | OUTPATIENT
Start: 2024-01-14 | End: 2024-01-15

## 2024-01-14 RX ORDER — KETOROLAC TROMETHAMINE 30 MG/ML
15 INJECTION, SOLUTION INTRAMUSCULAR; INTRAVENOUS ONCE
Status: COMPLETED | OUTPATIENT
Start: 2024-01-14 | End: 2024-01-14

## 2024-01-14 RX ORDER — NOREPINEPHRINE BITARTRATE/D5W 8 MG/250ML
.01-.5 PLASTIC BAG, INJECTION (ML) INTRAVENOUS CONTINUOUS
Status: DISCONTINUED | OUTPATIENT
Start: 2024-01-14 | End: 2024-01-16

## 2024-01-14 RX ORDER — HEPARIN SODIUM 5000 [USP'U]/ML
5000 INJECTION, SOLUTION INTRAVENOUS; SUBCUTANEOUS EVERY 8 HOURS
Status: DISCONTINUED | OUTPATIENT
Start: 2024-01-14 | End: 2024-01-23 | Stop reason: HOSPADM

## 2024-01-14 RX ORDER — ONDANSETRON HYDROCHLORIDE 2 MG/ML
4 INJECTION, SOLUTION INTRAVENOUS ONCE
Status: COMPLETED | OUTPATIENT
Start: 2024-01-14 | End: 2024-01-14

## 2024-01-14 RX ORDER — ACETAMINOPHEN 325 MG/1
975 TABLET ORAL ONCE
Status: COMPLETED | OUTPATIENT
Start: 2024-01-14 | End: 2024-01-14

## 2024-01-14 RX ORDER — NOREPINEPHRINE BITARTRATE/D5W 8 MG/250ML
PLASTIC BAG, INJECTION (ML) INTRAVENOUS
Status: COMPLETED
Start: 2024-01-14 | End: 2024-01-14

## 2024-01-14 RX ORDER — FENTANYL CITRATE 50 UG/ML
25 INJECTION, SOLUTION INTRAMUSCULAR; INTRAVENOUS ONCE
Status: COMPLETED | OUTPATIENT
Start: 2024-01-14 | End: 2024-01-14

## 2024-01-14 RX ADMIN — Medication 0.06 MCG/KG/MIN: at 19:28

## 2024-01-14 RX ADMIN — ACETAMINOPHEN 975 MG: 325 TABLET ORAL at 18:19

## 2024-01-14 RX ADMIN — AZITHROMYCIN MONOHYDRATE 500 MG: 500 INJECTION, POWDER, LYOPHILIZED, FOR SOLUTION INTRAVENOUS at 15:52

## 2024-01-14 RX ADMIN — SODIUM CHLORIDE 1000 ML: 9 INJECTION, SOLUTION INTRAVENOUS at 17:50

## 2024-01-14 RX ADMIN — PIPERACILLIN SODIUM AND TAZOBACTAM SODIUM 4.5 G: 4; .5 INJECTION, SOLUTION INTRAVENOUS at 15:00

## 2024-01-14 RX ADMIN — SODIUM CHLORIDE 1000 ML: 9 INJECTION, SOLUTION INTRAVENOUS at 13:35

## 2024-01-14 RX ADMIN — VANCOMYCIN HYDROCHLORIDE 2 G: 10 INJECTION, POWDER, LYOPHILIZED, FOR SOLUTION INTRAVENOUS at 17:28

## 2024-01-14 RX ADMIN — ACETAMINOPHEN 975 MG: 325 TABLET ORAL at 13:20

## 2024-01-14 RX ADMIN — NOREPINEPHRINE BITARTRATE 0.06 MCG/KG/MIN: 8 INJECTION, SOLUTION INTRAVENOUS at 19:28

## 2024-01-14 RX ADMIN — FENTANYL CITRATE 25 MCG: 50 INJECTION INTRAMUSCULAR; INTRAVENOUS at 16:12

## 2024-01-14 RX ADMIN — SODIUM CHLORIDE 100 ML/HR: 9 INJECTION, SOLUTION INTRAVENOUS at 15:51

## 2024-01-14 RX ADMIN — KETOROLAC TROMETHAMINE 15 MG: 30 INJECTION, SOLUTION INTRAMUSCULAR; INTRAVENOUS at 18:21

## 2024-01-14 RX ADMIN — HEPARIN SODIUM 5000 UNITS: 5000 INJECTION INTRAVENOUS; SUBCUTANEOUS at 22:31

## 2024-01-14 RX ADMIN — PIPERACILLIN SODIUM AND TAZOBACTAM SODIUM 2.25 G: 2; .25 INJECTION, SOLUTION INTRAVENOUS at 23:29

## 2024-01-14 RX ADMIN — ONDANSETRON 4 MG: 2 INJECTION INTRAMUSCULAR; INTRAVENOUS at 18:20

## 2024-01-14 SDOH — SOCIAL STABILITY: SOCIAL INSECURITY: DO YOU FEEL ANYONE HAS EXPLOITED OR TAKEN ADVANTAGE OF YOU FINANCIALLY OR OF YOUR PERSONAL PROPERTY?: NO

## 2024-01-14 SDOH — SOCIAL STABILITY: SOCIAL INSECURITY: ARE YOU OR HAVE YOU BEEN THREATENED OR ABUSED PHYSICALLY, EMOTIONALLY, OR SEXUALLY BY ANYONE?: NO

## 2024-01-14 SDOH — SOCIAL STABILITY: SOCIAL INSECURITY: DO YOU FEEL UNSAFE GOING BACK TO THE PLACE WHERE YOU ARE LIVING?: NO

## 2024-01-14 SDOH — SOCIAL STABILITY: SOCIAL INSECURITY: ARE THERE ANY APPARENT SIGNS OF INJURIES/BEHAVIORS THAT COULD BE RELATED TO ABUSE/NEGLECT?: NO

## 2024-01-14 SDOH — SOCIAL STABILITY: SOCIAL INSECURITY: HAS ANYONE EVER THREATENED TO HURT YOUR FAMILY OR YOUR PETS?: NO

## 2024-01-14 SDOH — SOCIAL STABILITY: SOCIAL INSECURITY: WERE YOU ABLE TO COMPLETE ALL THE BEHAVIORAL HEALTH SCREENINGS?: NO

## 2024-01-14 SDOH — SOCIAL STABILITY: SOCIAL INSECURITY: DOES ANYONE TRY TO KEEP YOU FROM HAVING/CONTACTING OTHER FRIENDS OR DOING THINGS OUTSIDE YOUR HOME?: NO

## 2024-01-14 SDOH — SOCIAL STABILITY: SOCIAL INSECURITY: ABUSE: ADULT

## 2024-01-14 SDOH — SOCIAL STABILITY: SOCIAL INSECURITY: HAVE YOU HAD THOUGHTS OF HARMING ANYONE ELSE?: NO

## 2024-01-14 ASSESSMENT — ENCOUNTER SYMPTOMS
PALPITATIONS: 0
ARTHRALGIAS: 0
SEIZURES: 0
DYSURIA: 0
ABDOMINAL PAIN: 0
CHILLS: 1
COLOR CHANGE: 0
COUGH: 1
VOMITING: 0
SORE THROAT: 0
FATIGUE: 1
SHORTNESS OF BREATH: 1
BACK PAIN: 0
FEVER: 1
WOUND: 1
HEMATURIA: 0
EYE PAIN: 0

## 2024-01-14 ASSESSMENT — ACTIVITIES OF DAILY LIVING (ADL)
ADEQUATE_TO_COMPLETE_ADL: YES
DRESSING YOURSELF: DEPENDENT
BATHING: DEPENDENT
GROOMING: DEPENDENT
HEARING - RIGHT EAR: FUNCTIONAL
HEARING - LEFT EAR: FUNCTIONAL
TOILETING: DEPENDENT
FEEDING YOURSELF: NEEDS ASSISTANCE
JUDGMENT_ADEQUATE_SAFELY_COMPLETE_DAILY_ACTIVITIES: NO
WALKS IN HOME: DEPENDENT
PATIENT'S MEMORY ADEQUATE TO SAFELY COMPLETE DAILY ACTIVITIES?: NO

## 2024-01-14 ASSESSMENT — COLUMBIA-SUICIDE SEVERITY RATING SCALE - C-SSRS
6. HAVE YOU EVER DONE ANYTHING, STARTED TO DO ANYTHING, OR PREPARED TO DO ANYTHING TO END YOUR LIFE?: NO
1. IN THE PAST MONTH, HAVE YOU WISHED YOU WERE DEAD OR WISHED YOU COULD GO TO SLEEP AND NOT WAKE UP?: NO
2. HAVE YOU ACTUALLY HAD ANY THOUGHTS OF KILLING YOURSELF?: NO

## 2024-01-14 ASSESSMENT — LIFESTYLE VARIABLES
AUDIT-C TOTAL SCORE: 0
HOW OFTEN DO YOU HAVE A DRINK CONTAINING ALCOHOL: NEVER
PRESCIPTION_ABUSE_PAST_12_MONTHS: NO
HOW OFTEN DO YOU HAVE 6 OR MORE DRINKS ON ONE OCCASION: NEVER
SKIP TO QUESTIONS 9-10: 1
AUDIT-C TOTAL SCORE: 0
HOW MANY STANDARD DRINKS CONTAINING ALCOHOL DO YOU HAVE ON A TYPICAL DAY: PATIENT DOES NOT DRINK
SUBSTANCE_ABUSE_PAST_12_MONTHS: NO

## 2024-01-14 ASSESSMENT — PATIENT HEALTH QUESTIONNAIRE - PHQ9
2. FEELING DOWN, DEPRESSED OR HOPELESS: NOT AT ALL
SUM OF ALL RESPONSES TO PHQ9 QUESTIONS 1 & 2: 0
1. LITTLE INTEREST OR PLEASURE IN DOING THINGS: NOT AT ALL

## 2024-01-14 ASSESSMENT — PAIN SCALES - GENERAL
PAINLEVEL_OUTOF10: 0 - NO PAIN

## 2024-01-14 ASSESSMENT — PAIN - FUNCTIONAL ASSESSMENT
PAIN_FUNCTIONAL_ASSESSMENT: 0-10
PAIN_FUNCTIONAL_ASSESSMENT: 0-10

## 2024-01-14 NOTE — Clinical Note
Patient Clipped and Prepped: groin. Prepped with ChloraPrep, a minimum of 3 minute dry time, longer if needed, no pooling noted, patient draped in sterile fashion and Betadine and draped in sterile fashion.

## 2024-01-14 NOTE — ED PROVIDER NOTES
HPI   Chief Complaint   Patient presents with    Cough         38-year-old male, multiple medical problems, [facility] resident, history of diabetes, multiple extremity amputations, congestive heart failure end-stage renal disease hemodialysis prior sepsis issues and cellulitis with GERD, presents with high fever chills cough congestion onset today.  Patient poor historian does not want to give more details, but only complaint is the cough congestion and fever.                          [place] Coma Scale Score: 15                  Patient History   Past Medical History:   Diagnosis Date    Essential (primary) hypertension 05/26/2017    HTN (hypertension), benign    Personal history of other endocrine, nutritional and metabolic disease 05/26/2017    History of diabetes mellitus     Past Surgical History:   Procedure Laterality Date    CT AORTA AND BILATERAL ILIOFEMORAL RUNOFF ANGIOGRAM W AND/OR WO IV CONTRAST  2/9/2023    CT AORTA AND BILATERAL ILIOFEMORAL RUNOFF ANGIOGRAM W AND/OR WO IV CONTRAST 2/9/2023 DOCTOR OFFICE LEGAC[facility]    IR CVC EXCHANGE  11/13/2023    IR CVC EXCHANGE 11/13/2023 [facility]     Family History   Problem Relation Name Age of Onset    Other (DIABETES DUE TO UNDERLYING CONDITION WITH MICROALBUMINURIA) Father      Other (DIABETES DUE TO UNDERLYING CONDITION WITH MICROALBUMINURIA [Other]) Other AUNT     Other (DIABETES DUE TO UNDERLYING CONDITION WITH MICROALBUMINURIA [Other]) Other GP      Social History     Tobacco Use    Smoking status: Every Day     Packs/day: 0.25     Years: 15.00     Additional pack years: 0.00     Total pack years: 3.75     Types: Cigarettes    Smokeless tobacco: Never   Substance Use Topics    Alcohol use: Not on file    Drug use: Not on file       Review of Systems   Constitutional:  Positive for chills, fatigue and fever.   HENT:  Negative for ear pain and sore throat.    Eyes:  Negative for pain and visual disturbance.   Respiratory:  Positive for cough and shortness  of breath.    Cardiovascular:  Negative for chest pain and palpitations.   Gastrointestinal:  Negative for abdominal pain and vomiting.   Genitourinary:  Negative for dysuria and hematuria.   Musculoskeletal:  Negative for arthralgias and back pain.   Skin:  Positive for wound. Negative for color change and rash.        Chronic wound right heel.   Neurological:  Negative for seizures and syncope.   All other systems reviewed and are negative.       Physical Exam   ED Triage Vitals   Temp Pulse Resp BP   -- -- -- --      SpO2 Temp src Heart Rate Source Patient Position   -- -- -- --      BP Location FiO2 (%)     -- --       Physical Exam  Vitals reviewed.   Constitutional:       General: He is not in acute distress.     Appearance: He is well-developed. He is not ill-appearing.   HENT:      Head: Normocephalic and atraumatic.      Right Ear: External ear normal.      Left Ear: External ear normal.      Nose: Nose normal.      Mouth/Throat:      Mouth: Mucous membranes are moist.      Pharynx: Oropharynx is clear.   Eyes:      Conjunctiva/sclera: Conjunctivae normal.      Pupils: Pupils are equal, round, and reactive to light.   Neck:      Vascular: No JVD.   Cardiovascular:      Rate and Rhythm: Regular rhythm. Tachycardia present.      Pulses: Normal pulses.   Pulmonary:      Effort: Pulmonary effort is normal. No accessory muscle usage or respiratory distress.      Breath sounds: Rhonchi present.   Abdominal:      General: Abdomen is flat. There is no distension.      Palpations: Abdomen is soft. There is no mass.      Tenderness: There is no abdominal tenderness.   Musculoskeletal:      Cervical back: Normal range of motion and neck supple.      Comments: Right upper extremity proximal amputation, left AKA.    States has dialysis catheter in right leg/thigh.   Lymphadenopathy:      Cervical: No cervical adenopathy.   Skin:     General: Skin is warm and dry.      Capillary Refill: Capillary refill takes less than 2  seconds.      Comments: No zoster rash seen   Neurological:      General: No focal deficit present.      Mental Status: He is alert. Mental status is at baseline.   Psychiatric:         Mood and Affect: Mood normal.                 ECG if performed, ordered and interpreted by ED physician:        Labs Reviewed   BLOOD CULTURE - Abnormal       Result Value    Blood Culture Staphylococcus aureus (*)     BLOOD CULTURE BOTTLE  Positive Aerobic Bottle      Gram Stain Gram positive cocci, clusters (*)     Gram Stain Gram positive cocci, clusters (*)    BLOOD CULTURE - Abnormal    Blood Culture Staphylococcus aureus (*)     BLOOD CULTURE BOTTLE  Positive Aerobic Bottle      Gram Stain Gram positive cocci, clusters (*)     Gram Stain Gram positive cocci, clusters (*)    MRSA SURVEILLANCE FOR VANCOMYCIN DE-ESCALATION, PCR - Abnormal    MRSA PCR Detected (*)     Narrative:     This assay is an FDA-approved in vitro diagnostic nucleic acid amplification test for the detection of methicillin-resistant Staphylococcus aureus (MRSA) DNA directly from nasal swabs in patients at risk for nasal colonization. MRSA NxG is intended to aid in the prevention and control of MRSA infections in healthcare settings. This assay is NOT intended to diagnose, guide, or monitor treatment for MRSA infections, or provide results of susceptibility to methicillin. A negative result does not preclude MRSA nasal colonization. Test performance has not been evaluated in patients less than two years of age.   CBC WITH AUTO DIFFERENTIAL - Abnormal    WBC 10.7      nRBC 0.0      RBC 4.09 (*)     Hemoglobin 10.7 (*)     Hematocrit 35.2 (*)     MCV 86      MCH 26.2      MCHC 30.4 (*)     RDW 21.4 (*)     Platelets 190      Neutrophils % 85.3      Immature Granulocytes %, Automated 0.4      Lymphocytes % 7.7      Monocytes % 5.1      Eosinophils % 1.0      Basophils % 0.5      Neutrophils Absolute 9.12 (*)     Immature Granulocytes Absolute, Automated 0.04       Lymphocytes Absolute 0.82 (*)     Monocytes Absolute 0.54      Eosinophils Absolute 0.11      Basophils Absolute 0.05     COMPREHENSIVE METABOLIC PANEL - Abnormal    Glucose 85      Sodium 131 (*)     Potassium 4.4      Chloride 93 (*)     Bicarbonate 25      Anion Gap 17      Urea Nitrogen 27 (*)     Creatinine 7.74 (*)     eGFR 8 (*)     Calcium 9.8      Albumin 3.6      Alkaline Phosphatase 183 (*)     Total Protein 8.3 (*)     AST 13      Bilirubin, Total 0.6      ALT 10     BLOOD GAS VENOUS FULL PANEL - Abnormal    POCT pH, Venous 7.41      POCT pCO2, Venous 46      POCT pO2, Venous 23 (*)     POCT SO2, Venous 26 (*)     POCT Oxy Hemoglobin, Venous 25.3 (*)     POCT Hematocrit Calculated, Venous 34.0 (*)     POCT Sodium, Venous 132 (*)     POCT Potassium, Venous 4.5      POCT Chloride, Venous 94 (*)     POCT Ionized Calicum, Venous 1.21      POCT Glucose, Venous 93      POCT Lactate, Venous 2.2 (*)     POCT Base Excess, Venous 3.9 (*)     POCT HCO3 Calculated, Venous 29.2 (*)     POCT Hemoglobin, Venous 11.4 (*)     POCT Anion Gap, Venous 13.0      Patient Temperature 37.0      FiO2 21     RENAL FUNCTION PANEL - Abnormal    Glucose 143 (*)     Sodium 130 (*)     Potassium 4.4      Chloride 95 (*)     Bicarbonate 22      Anion Gap 17      Urea Nitrogen 30 (*)     Creatinine 8.25 (*)     eGFR 8 (*)     Calcium 8.8      Phosphorus 5.6 (*)     Albumin 2.9 (*)    CBC - Abnormal    WBC 8.1      nRBC 0.0      RBC 3.76 (*)     Hemoglobin 10.0 (*)     Hematocrit 32.5 (*)     MCV 86      MCH 26.6      MCHC 30.8 (*)     RDW 21.5 (*)     Platelets 136 (*)    POCT GLUCOSE - Abnormal    POCT Glucose 119 (*)    POCT GLUCOSE - Abnormal    POCT Glucose 130 (*)    LACTATE - Normal    Lactate 1.6      Narrative:     Venipuncture immediately after or during the administration of Metamizole may lead to falsely low results. Testing should be performed immediately  prior to Metamizole dosing.   SARS-COV-2 PCR, SYMPTOMATIC - Normal     Coronavirus 2019, PCR Not Detected      Narrative:     This assay has received FDA Emergency Use Authorization (EUA) and is only authorized for the duration of time that circumstances exist to justify the authorization of the emergency use of in vitro diagnostic tests for the detection of SARS-CoV-2 virus and/or diagnosis of COVID-19 infection under section 564(b)(1) of the Act, 21 U.S.C. 360bbb-3(b)(1). This assay is an in vitro diagnostic nucleic acid amplification test for the qualitative detection of SARS-CoV-2 from nasopharyngeal specimens and has been validated for use at [facility]. Negative results do not preclude COVID-19 infections and should not be used as the sole basis for diagnosis, treatment, or other management decisions.     INFLUENZA A AND B PCR - Normal    Flu A Result Not Detected      Flu B Result Not Detected      Narrative:     This assay is an in vitro diagnostic multiplex nucleic acid amplification test for the detection and discrimination of Influenza A & B from nasopharyngeal specimens, and has been validated for use at [facility]. Negative results do not preclude Influenza A/B infections, and should not be used as the sole basis for diagnosis, treatment, or other management decisions. If Influenza A/B and RSV PCR results are negative, testing for Parainfluenza virus, Adenovirus and Metapneumovirus is routinely performed for [facility] pediatric oncology and intensive care inpatients, and is available on other patients by placing an add-on request.   RSV PCR - Normal    RSV PCR Not Detected      Narrative:     This assay is an FDA-cleared, in vitro diagnostic nucleic acid amplification test for the detection of RSV from nasopharyngeal specimens, and has been validated for use at [facility]. Negative results do not preclude RSV infections, and should not be used as the sole basis for diagnosis, treatment, or other management  decisions. If Influenza A/B and RSV PCR results are negative, testing for Parainfluenza virus, Adenovirus and Metapneumovirus is routinely performed for pediatric oncology and intensive care inpatients at [facility], and is available on other patients by placing an add-on request.       BLOOD GAS LACTIC ACID, VENOUS - Normal    POCT Lactate, Venous 1.7     HEPATITIS B SURFACE ANTIBODY - Normal    Hepatitis B Surface AB <3.1     BLOOD CULTURE   BLOOD CULTURE   URINALYSIS WITH REFLEX CULTURE AND MICROSCOPIC    Narrative:     The following orders were created for panel order Urinalysis with Reflex Culture and Microscopic.  Procedure                               Abnormality         Status                     ---------                               -----------         ------                     Urinalysis with Reflex C...[[id number]]                                                 Extra Urine Gray Tube[[id number]]                                                         Please view results for these tests on the individual orders.   URINALYSIS WITH REFLEX CULTURE AND MICROSCOPIC   EXTRA URINE GRAY TUBE   CBC   RENAL FUNCTION PANEL   MAGNESIUM   POCT GLUCOSE METER   POCT GLUCOSE METER   MORPHOLOGY    RBC Morphology See Below      Hypochromia Marked      Target Cells Few      Ovalocytes Few      Clumped Platelets Present            US gallbladder   Final Result   Prominent gallbladder wall thickening measuring 10 mm in thickness   and pericholecystic edema. There is however no definite evidence of   gallstones and per the sonographer, sonographic Muñiz sign is   absent. The gallbladder wall thickening is therefore nonspecific and   may relate to underlying liver disease, hypoproteinemia or cardiac   disease. Clinical correlation is recommended and if there is concern   for acalculous cholecystitis, HIDA scan may be obtained for further   evaluation.        MACRO:   None        Signed by: Houston Conde 1/14/2024 7:32 PM   Dictation  workstation:   [id number]      CT chest abdomen pelvis wo IV contrast   Final Result   1.   Air in vasculature in the chest, abdomen and pelvis as described   above which may relate to vascular access, however clinical   correlation is recommended if there is concern for other etiologies.        2. Moderate size partially loculated left pleural effusion and small   right pleural effusion and bilateral atelectatic/consolidative   opacities. New 2 cm x 0.9 cm subpleural opacity right lower lobe with   mild calcifications.        3.  Right femoral central venous catheter which extends into the   chest and terminates in the SVC.        4.  Lymphadenopathy in the imaged lower neck and mediastinal   lymphadenopathy. Multiple prominent retroperitoneal lymph nodes are   also noted and enlarged inguinal lymph nodes. The lymph nodes may be   reactive in nature, however clinical correlation is recommended there   is concern for malignancy or other etiology.        5.   Gallbladder wall thickening and pericholecystic edema is   nonspecific and may relate to underlying liver disease,   hypoproteinemia or cardiac disease, however if there is concern for   acute cholecystitis/acute gallbladder pathology, right upper quadrant   ultrasound is recommended for further evaluation.        6.   Low attenuation tubular opacity in the pancreatic tail measuring   5 mm in diameter may correspond to dilated pancreatic duct and may be   better assessed on follow-up contrast enhanced imaging.        7.  Bilateral renal atrophy and multiple bilateral renal cysts and   subcentimeter hypodensities too small to characterize. Limited   evaluation for renal mass on noncontrast examination.        8.  Extensive atherosclerotic calcification in the chest, abdomen and   pelvis.        9.  Underdistention versus urinary bladder wall thickening; please   correlate urinalysis to evaluate for cystitis.        MACRO:   None        Signed by: Houston Conde  1/14/2024 6:05 PM   Dictation workstation:   [id number]      XR chest 2 views   Final Result   1. Small left pleural effusion with associated atelectasis and/or   pneumonitis.   2. Right basilar atelectasis and/or scarring.        MACRO:   None        Signed by: Jarvis Holcomb 1/14/2024 2:51 PM   Dictation workstation:   [id number]      Transthoracic Echo (TTE) Complete    (Results Pending)   IR CVC PICC    (Results Pending)            ED Course & MDM     ED Medication Administration from 01/14/2024 1254 to 01/14/2024 2224         Date/Time Order Dose Route Action Action by     01/14/2024 1320 EST acetaminophen (Tylenol) tablet 975 mg 975 mg oral Given Tiana, Y     01/14/2024 1335 EST sodium chloride 0.9 % bolus 2,382 mL 1,000 mL intravenous New Bag Tiana, Y     01/14/2024 1500 EST piperacillin-tazobactam-dextrose (Zosyn) IV 4.5 g 4.5 g intravenous New Bag Colten, SCOTT     01/14/2024 1536 EST sodium chloride 0.9 % bolus 2,382 mL 0 mL intravenous Stopped Tiana, Y     01/14/2024 1542 EST piperacillin-tazobactam-dextrose (Zosyn) IV 4.5 g 0 g intravenous Stopped Tiana, Y     01/14/2024 1551 EST sodium chloride 0.9% infusion 100 mL/hr intravenous New Bag Colten, SCOTT     01/14/2024 1552 EST azithromycin (Zithromax) in dextrose 5 % in water (D5W) 250 mL  mg 500 mg intravenous New Bag Colten, SCOTT     01/14/2024 1612 EST fentaNYL PF (Sublimaze) injection 25 mcg 25 mcg intravenous Given Colten, SCOTT     01/14/2024 1700 EST sodium chloride 0.9% infusion 0 mL/hr intravenous Stopped Colten, SCOTT     01/14/2024 1722 EST azithromycin (Zithromax) in dextrose 5 % in water (D5W) 250 mL  mg 0 mg intravenous Stopped SCOTT Abel     01/14/2024 1728 EST vancomycin (Vancocin) 2000 mg/500 ml in D5W 500 mL IV piggyback 2 g 2 g intravenous Given Tiana, Y     01/14/2024 1750 EST sodium chloride 0.9 % bolus 1,000 mL 1,000 mL intravenous New Bag Tiana, Y     01/14/2024 1819 EST  acetaminophen (Tylenol) tablet 975 mg 975 mg oral Given SCOTT Abel     01/14/2024 1820 EST ondansetron (Zofran) injection 4 mg 4 mg intravenous Given Colten SCOTT     01/14/2024 1820 EST sodium chloride 0.9 % bolus 1,000 mL 0 mL intravenous Stopped JUANCHO Morales     01/14/2024 1821 EST ketorolac (Toradol) injection 15 mg 15 mg intravenous Given Colten SCOTT     01/14/2024 1928 EST norepinephrine (Levophed) 8 mg in dextrose 5% 250 mL (0.032 mg/mL) infusion (premix) 0.06 mcg/kg/min intravenous New Bag JUANCHO Morales                   ED Course as of 01/15/24 2344   Sun Jan 14, 2024   1318 ECG 12 Lead  EKG ordered and interpreted by ED physician demonstrates sinus tachycardia, 150 bpm, moderate artifact.  No acute ischemic findings. [KS]   2131 Patient's blood pressures continue to decline despite fluid resuscitation.  Is mildly hypoxemic on room air to 90%, was placed on 4 L nasal cannula with improvement in oxygen saturation to 100%.  Given patient persistent hypotension consistent with septic shock, patient was started on intravenous Levophed.  Discussed with intensivist who agrees to accept the patient to their service.  Attempted triple-lumen central venous catheter placement of the right IJ, due to small vessel size the wire displaced from the vessel prior to dilation, attempt was aborted in this area.  Attempted central venous catheter placement in the left groin, met significant resistance with dilator placement, therefore aborted attempt in this location.  Evaluated left internal jugular vein under ultrasound, patient appears to have either a large amount of scar tissue or potentially clot in the left distal internal jugular, additionally vein appears directly above the carotid artery on the side.  Discussed patient further with ICU attending who is comfortable taking the patient with large-bore left proximal peripheral IV given multiple attempts at central venous access.  Patient admitted to the ICU in stable condition.  [BR]      ED Course User Index  [BR] Surendra Luu MD  [KS] Porter Rolle MD MPH         Diagnoses as of 01/15/24 2344   Septic shock (CMS/[facility])       Medical Decision Making        Procedure  Procedures                 Porter Rolle MD MPH  01/14/24 1326       Porter Rolle MD MPH  01/15/24 9115

## 2024-01-14 NOTE — ED NOTES
Pt HR elevated at 130's after coming down. New doc at bedside and re-eval done.  Advised to open up fluids, new saline bolus ordered.  Temp rechecked and is spiking again.      Karley Montiel RN  01/14/24 4176

## 2024-01-14 NOTE — PROGRESS NOTES
Received patient in signout from Dr. Rolle. In short the patient is a 38 y.o. male presenting with cough, fever, tachycardia. In the ED, patient febrile, hypotensive, initially improving with IV fluids however noted to become more tachycardic, demonstrating downtrending BP. Administered another 1L IV fluid bolus with transient improvement in BP but again became hypotensive. CT imaging showing loculated pleural effusion, did show some edema around gallbladder but subsequent GB US showing no clear cholecystitis. Given hypotension refractory to IV fluids started on norepinephrine. Consistent with septic shock, engaged intensivist who agrees to admit patient to his service. Patient admitted in stable condition.    ED Course as of 01/15/24 1300   Sun Jan 14, 2024   1318 ECG 12 Lead  EKG ordered and interpreted by ED physician demonstrates sinus tachycardia, 150 bpm, moderate artifact.  No acute ischemic findings. [KS]   2823 Patient's blood pressures continue to decline despite fluid resuscitation.  Is mildly hypoxemic on room air to 90%, was placed on 4 L nasal cannula with improvement in oxygen saturation to 100%.  Given patient persistent hypotension consistent with septic shock, patient was started on intravenous Levophed.  Discussed with intensivist who agrees to accept the patient to their service.  Attempted triple-lumen central venous catheter placement of the right IJ, due to small vessel size the wire displaced from the vessel prior to dilation, attempt was aborted in this area.  Attempted central venous catheter placement in the left groin, met significant resistance with dilator placement, therefore aborted attempt in this location.  Evaluated left internal jugular vein under ultrasound, patient appears to have either a large amount of scar tissue or potentially clot in the left distal internal jugular, additionally vein appears directly above the carotid artery on the side.  Discussed patient further with  ICU attending who is comfortable taking the patient with large-bore left proximal peripheral IV given multiple attempts at central venous access.  Patient admitted to the ICU in stable condition. [BR]      ED Course User Index  [BR] Surendra Luu MD  [KS] Porter Rolle MD MPH         Diagnoses as of 01/15/24 1300   Septic shock (CMS/HCC)

## 2024-01-15 ENCOUNTER — APPOINTMENT (OUTPATIENT)
Dept: DIALYSIS | Facility: HOSPITAL | Age: 39
End: 2024-01-15
Payer: COMMERCIAL

## 2024-01-15 ENCOUNTER — APPOINTMENT (OUTPATIENT)
Dept: CARDIOLOGY | Facility: HOSPITAL | Age: 39
DRG: 314 | End: 2024-01-15
Payer: COMMERCIAL

## 2024-01-15 LAB
ALBUMIN SERPL BCP-MCNC: 2.9 G/DL (ref 3.4–5)
ANION GAP SERPL CALC-SCNC: 17 MMOL/L (ref 10–20)
ATRIAL RATE: 150 BPM
BUN SERPL-MCNC: 30 MG/DL (ref 6–23)
CALCIUM SERPL-MCNC: 8.8 MG/DL (ref 8.6–10.3)
CHLORIDE SERPL-SCNC: 95 MMOL/L (ref 98–107)
CO2 SERPL-SCNC: 22 MMOL/L (ref 21–32)
CREAT SERPL-MCNC: 8.25 MG/DL (ref 0.5–1.3)
EGFRCR SERPLBLD CKD-EPI 2021: 8 ML/MIN/1.73M*2
ERYTHROCYTE [DISTWIDTH] IN BLOOD BY AUTOMATED COUNT: 21.5 % (ref 11.5–14.5)
GLUCOSE BLD MANUAL STRIP-MCNC: 119 MG/DL (ref 74–99)
GLUCOSE BLD MANUAL STRIP-MCNC: 130 MG/DL (ref 74–99)
GLUCOSE SERPL-MCNC: 143 MG/DL (ref 74–99)
HBV SURFACE AB SER-ACNC: <3.1 MIU/ML
HCT VFR BLD AUTO: 32.5 % (ref 41–52)
HGB BLD-MCNC: 10 G/DL (ref 13.5–17.5)
MCH RBC QN AUTO: 26.6 PG (ref 26–34)
MCHC RBC AUTO-ENTMCNC: 30.8 G/DL (ref 32–36)
MCV RBC AUTO: 86 FL (ref 80–100)
MRSA DNA SPEC QL NAA+PROBE: DETECTED
NRBC BLD-RTO: 0 /100 WBCS (ref 0–0)
P AXIS: 44 DEGREES
P OFFSET: 207 MS
P ONSET: 162 MS
PHOSPHATE SERPL-MCNC: 5.6 MG/DL (ref 2.5–4.9)
PLATELET # BLD AUTO: 136 X10*3/UL (ref 150–450)
POTASSIUM SERPL-SCNC: 4.4 MMOL/L (ref 3.5–5.3)
PR INTERVAL: 130 MS
Q ONSET: 227 MS
QRS COUNT: 25 BEATS
QRS DURATION: 64 MS
QT INTERVAL: 256 MS
QTC CALCULATION(BAZETT): 404 MS
QTC FREDERICIA: 347 MS
R AXIS: 72 DEGREES
RBC # BLD AUTO: 3.76 X10*6/UL (ref 4.5–5.9)
SODIUM SERPL-SCNC: 130 MMOL/L (ref 136–145)
T AXIS: 32 DEGREES
T OFFSET: 355 MS
VENTRICULAR RATE: 150 BPM
WBC # BLD AUTO: 8.1 X10*3/UL (ref 4.4–11.3)

## 2024-01-15 PROCEDURE — 2020000001 HC ICU ROOM DAILY

## 2024-01-15 PROCEDURE — 93005 ELECTROCARDIOGRAM TRACING: CPT

## 2024-01-15 PROCEDURE — 87641 MR-STAPH DNA AMP PROBE: CPT | Performed by: EMERGENCY MEDICINE

## 2024-01-15 PROCEDURE — 8010000001 HC DIALYSIS - HEMODIALYSIS PER DAY

## 2024-01-15 PROCEDURE — 2500000004 HC RX 250 GENERAL PHARMACY W/ HCPCS (ALT 636 FOR OP/ED): Performed by: PHARMACIST

## 2024-01-15 PROCEDURE — 86706 HEP B SURFACE ANTIBODY: CPT | Mod: AHULAB | Performed by: INTERNAL MEDICINE

## 2024-01-15 PROCEDURE — 5A1D70Z PERFORMANCE OF URINARY FILTRATION, INTERMITTENT, LESS THAN 6 HOURS PER DAY: ICD-10-PCS | Performed by: INTERNAL MEDICINE

## 2024-01-15 PROCEDURE — 36415 COLL VENOUS BLD VENIPUNCTURE: CPT | Performed by: ANESTHESIOLOGY

## 2024-01-15 PROCEDURE — 99221 1ST HOSP IP/OBS SF/LOW 40: CPT

## 2024-01-15 PROCEDURE — 2500000004 HC RX 250 GENERAL PHARMACY W/ HCPCS (ALT 636 FOR OP/ED): Performed by: NURSE PRACTITIONER

## 2024-01-15 PROCEDURE — 6350000001 HC RX 635 EPOETIN >10,000 UNITS: Mod: JW | Performed by: INTERNAL MEDICINE

## 2024-01-15 PROCEDURE — 80069 RENAL FUNCTION PANEL: CPT | Performed by: ANESTHESIOLOGY

## 2024-01-15 PROCEDURE — 2500000004 HC RX 250 GENERAL PHARMACY W/ HCPCS (ALT 636 FOR OP/ED): Performed by: ANESTHESIOLOGY

## 2024-01-15 PROCEDURE — 36415 COLL VENOUS BLD VENIPUNCTURE: CPT | Performed by: INTERNAL MEDICINE

## 2024-01-15 PROCEDURE — 85027 COMPLETE CBC AUTOMATED: CPT | Performed by: ANESTHESIOLOGY

## 2024-01-15 PROCEDURE — 99233 SBSQ HOSP IP/OBS HIGH 50: CPT | Performed by: NURSE PRACTITIONER

## 2024-01-15 PROCEDURE — 82947 ASSAY GLUCOSE BLOOD QUANT: CPT

## 2024-01-15 RX ORDER — BISACODYL 5 MG
5 TABLET, DELAYED RELEASE (ENTERIC COATED) ORAL DAILY PRN
Status: DISCONTINUED | OUTPATIENT
Start: 2024-01-15 | End: 2024-01-23 | Stop reason: HOSPADM

## 2024-01-15 RX ORDER — ONDANSETRON HYDROCHLORIDE 2 MG/ML
4 INJECTION, SOLUTION INTRAVENOUS EVERY 6 HOURS PRN
Status: DISCONTINUED | OUTPATIENT
Start: 2024-01-15 | End: 2024-01-16 | Stop reason: SDUPTHER

## 2024-01-15 RX ORDER — VANCOMYCIN HYDROCHLORIDE 750 MG/150ML
750 INJECTION, SOLUTION INTRAVENOUS ONCE
Status: COMPLETED | OUTPATIENT
Start: 2024-01-15 | End: 2024-01-15

## 2024-01-15 RX ORDER — DEXTROSE MONOHYDRATE 100 MG/ML
0.3 INJECTION, SOLUTION INTRAVENOUS ONCE AS NEEDED
Status: DISCONTINUED | OUTPATIENT
Start: 2024-01-15 | End: 2024-01-23 | Stop reason: HOSPADM

## 2024-01-15 RX ORDER — LOPERAMIDE HCL 2 MG
4 TABLET ORAL EVERY 2 HOUR PRN
COMMUNITY
End: 2024-04-30 | Stop reason: WASHOUT

## 2024-01-15 RX ORDER — INSULIN LISPRO 100 [IU]/ML
0-10 INJECTION, SOLUTION INTRAVENOUS; SUBCUTANEOUS
Status: DISCONTINUED | OUTPATIENT
Start: 2024-01-15 | End: 2024-01-21

## 2024-01-15 RX ORDER — HEPARIN SODIUM,PORCINE/PF 10 UNIT/ML
10 SYRINGE (ML) INTRAVENOUS AS NEEDED
Status: DISCONTINUED | OUTPATIENT
Start: 2024-01-15 | End: 2024-01-23 | Stop reason: HOSPADM

## 2024-01-15 RX ORDER — BISACODYL 10 MG/1
SUPPOSITORY RECTAL DAILY PRN
COMMUNITY
End: 2024-01-22 | Stop reason: HOSPADM

## 2024-01-15 RX ORDER — ACETAMINOPHEN 325 MG/1
650 TABLET ORAL EVERY 6 HOURS PRN
Status: DISCONTINUED | OUTPATIENT
Start: 2024-01-15 | End: 2024-01-23 | Stop reason: HOSPADM

## 2024-01-15 RX ORDER — LEVOTHYROXINE SODIUM 125 UG/1
125 TABLET ORAL DAILY
Status: DISCONTINUED | OUTPATIENT
Start: 2024-01-15 | End: 2024-01-23 | Stop reason: HOSPADM

## 2024-01-15 RX ORDER — LEVOTHYROXINE SODIUM 125 UG/1
125 TABLET ORAL
COMMUNITY

## 2024-01-15 RX ORDER — SEVELAMER CARBONATE 800 MG/1
2400 TABLET, FILM COATED ORAL
Status: DISCONTINUED | OUTPATIENT
Start: 2024-01-15 | End: 2024-01-23 | Stop reason: HOSPADM

## 2024-01-15 RX ORDER — DEXTROSE 50 % IN WATER (D50W) INTRAVENOUS SYRINGE
25
Status: DISCONTINUED | OUTPATIENT
Start: 2024-01-15 | End: 2024-01-19

## 2024-01-15 RX ADMIN — HEPARIN SODIUM 5000 UNITS: 5000 INJECTION INTRAVENOUS; SUBCUTANEOUS at 06:27

## 2024-01-15 RX ADMIN — HEPARIN, PORCINE (PF) 10 UNIT/ML INTRAVENOUS SYRINGE 10 UNITS: at 06:27

## 2024-01-15 RX ADMIN — PIPERACILLIN SODIUM AND TAZOBACTAM SODIUM 2.25 G: 2; .25 INJECTION, SOLUTION INTRAVENOUS at 23:22

## 2024-01-15 RX ADMIN — HEPARIN SODIUM 5000 UNITS: 5000 INJECTION INTRAVENOUS; SUBCUTANEOUS at 22:04

## 2024-01-15 RX ADMIN — ACETAMINOPHEN 650 MG: 325 TABLET ORAL at 08:56

## 2024-01-15 RX ADMIN — PIPERACILLIN SODIUM AND TAZOBACTAM SODIUM 2.25 G: 2; .25 INJECTION, SOLUTION INTRAVENOUS at 06:27

## 2024-01-15 RX ADMIN — PIPERACILLIN SODIUM AND TAZOBACTAM SODIUM 2.25 G: 2; .25 INJECTION, SOLUTION INTRAVENOUS at 17:09

## 2024-01-15 RX ADMIN — ONDANSETRON 4 MG: 2 INJECTION INTRAMUSCULAR; INTRAVENOUS at 23:22

## 2024-01-15 RX ADMIN — EPOETIN ALFA-EPBX 6000 UNITS: 10000 INJECTION, SOLUTION INTRAVENOUS; SUBCUTANEOUS at 09:15

## 2024-01-15 RX ADMIN — ACETAMINOPHEN 650 MG: 325 TABLET ORAL at 20:58

## 2024-01-15 RX ADMIN — VANCOMYCIN HYDROCHLORIDE 750 MG: 750 INJECTION, SOLUTION INTRAVENOUS at 17:09

## 2024-01-15 ASSESSMENT — COGNITIVE AND FUNCTIONAL STATUS - GENERAL
MOVING FROM LYING ON BACK TO SITTING ON SIDE OF FLAT BED WITH BEDRAILS: A LOT
MOVING TO AND FROM BED TO CHAIR: TOTAL
MOBILITY SCORE: 8
DAILY ACTIVITIY SCORE: 12
DRESSING REGULAR UPPER BODY CLOTHING: A LOT
TURNING FROM BACK TO SIDE WHILE IN FLAT BAD: A LOT
TOILETING: TOTAL
PATIENT BASELINE BEDBOUND: YES
HELP NEEDED FOR BATHING: A LOT
WALKING IN HOSPITAL ROOM: TOTAL
STANDING UP FROM CHAIR USING ARMS: TOTAL
DRESSING REGULAR LOWER BODY CLOTHING: TOTAL
CLIMB 3 TO 5 STEPS WITH RAILING: TOTAL
PERSONAL GROOMING: A LOT

## 2024-01-15 ASSESSMENT — PAIN - FUNCTIONAL ASSESSMENT
PAIN_FUNCTIONAL_ASSESSMENT: 0-10
PAIN_FUNCTIONAL_ASSESSMENT: 0-10
PAIN_FUNCTIONAL_ASSESSMENT: NO/DENIES PAIN

## 2024-01-15 ASSESSMENT — PAIN SCALES - GENERAL
PAINLEVEL_OUTOF10: 0 - NO PAIN
PAINLEVEL_OUTOF10: 0 - NO PAIN

## 2024-01-15 ASSESSMENT — ACTIVITIES OF DAILY LIVING (ADL): LACK_OF_TRANSPORTATION: NO

## 2024-01-15 NOTE — PROGRESS NOTES
"Vancomycin Dosing by Pharmacy- INITIAL    Edwar Hemphill is a 38 y.o. year old male who Pharmacy has been consulted for vancomycin dosing for pneumonia. Based on the patient's indication and renal status this patient will be dosed based on a goal trough/random level of 15-20.     Renal function is currently ESRD on HD (MWF).    Visit Vitals  BP 93/60   Pulse (!) 114   Temp 39.5 °C (103.1 °F)   Resp 18        Lab Results   Component Value Date    CREATININE 7.74 (H) 01/14/2024    CREATININE 4.39 (H) 11/16/2023    CREATININE 5.98 (H) 11/15/2023    CREATININE 5.12 (H) 11/14/2023        Patient weight is No results found for: \"PTWEIGHT\"    No results found for: \"CULTURE\"     I/O last 3 completed shifts:  In: 2350 (29.6 mL/kg) [IV Piggyback:2350]  Out: - (0 mL/kg)   Weight: 79.4 kg   [unfilled]    Lab Results   Component Value Date    PATIENTTEMP 37.0 01/14/2024    PATIENTTEMP 37.0 07/09/2023    PATIENTTEMP 37.0 07/08/2023    PATIENTTEMP 37.0 07/08/2023          Assessment/Plan     Patient has already been given a loading dose of 2000 mg.  Will initiate vancomycin maintenance, Dose by level with HD    Follow-up level will be ordered on 01/17 at 0500 unless clinically indicated sooner.  Will continue to monitor renal function daily while on vancomycin and order serum creatinine at least every 48 hours if not already ordered.  Follow for continued vancomycin needs, clinical response, and signs/symptoms of toxicity.       Wali Chan, PharmD       "

## 2024-01-15 NOTE — PROGRESS NOTES
01/15/24 1253   Discharge Planning   Living Arrangements Alone   Support Systems Family members   Assistance Needed Total care   Type of Residence Nursing home/residential care   Do you have animals or pets at home? No   Who is requesting discharge planning? Provider   Home or Post Acute Services Post acute facilities (Rehab/SNF/etc)   Type of Post Acute Facility Services Long term care   Patient expects to be discharged to: [facility]   Does the patient need discharge transport arranged? Yes   RoundTrip coordination needed? Yes   Has discharge transport been arranged? No   Financial Resource Strain   How hard is it for you to pay for the very basics like food, housing, medical care, and heating? Not hard   Housing Stability   In the last 12 months, was there a time when you were not able to pay the mortgage or rent on time? N   In the last 12 months, how many places have you lived? 1   In the last 12 months, was there a time when you did not have a steady place to sleep or slept in a shelter (including now)? N   Transportation Needs   In the past 12 months, has lack of transportation kept you from medical appointments or from getting medications? no   In the past 12 months, has lack of transportation kept you from meetings, work, or from getting things needed for daily living? No   Patient Choice   Provider Choice list and CMS website (https://medicare.gov/care-compare#search) for post-acute Quality and Resource Measure Data were provided and reviewed with: Patient   Patient / Family choosing to utilize agency / facility established prior to hospitalization Yes            Patient came from [facility]. He is gennaro lift at the facility. He has left AKA and right upper arm amp. Patient came in with sepsis and is on IV abx. He has right femoral dialysis access and looks like is source of infection. Blood cultures are positive. Patient will have dialysis today and possible removal of dialysis site by  IR. Patient is not medically ready for discharge but when medically cleared will return to [facility].

## 2024-01-15 NOTE — CONSULTS
Wound Care Consult     Visit Date: 1/15/2024      Patient Name: Edwar Hemphill         MRN: [id number]           YOB: 1985     Reason for Consult: Assessment completed. Patient denied assessment to buttocks. Left AKA site intact.  Notified bedside RN. Additional supplies left at bedside.   Patient declined waffle boots or TruVue boots at this time. Patient was asked to reconsider and to notify staff if boots can be provided.         Pertinent Labs:   Albumin   Date Value Ref Range Status   01/15/2024 2.9 (L) 3.4 - 5.0 g/dL Final       Wound Assessment:  Wound Skin Tear Buttocks (Active)   Wound Image   11/14/23 1502   Site Assessment Excoriated 01/15/24 0400   Aracelis-Wound Assessment Excoriated 01/15/24 0400   Non-staged Wound Description Not applicable 01/15/24 0400   Drainage Description None 01/15/24 0400   Drainage Amount None 01/15/24 0400   Dressing Moisture barrier 11/13/23 2200       Wound 11/14/23 Other (comment) Heel Right (Active)   Wound Image   01/15/24 1343   Site Assessment Eschar 01/15/24 1343   Aracelis-Wound Assessment Dry;Other (Comment) 11/14/23 1520   Non-staged Wound Description Full thickness 01/15/24 0400   Shape Circular 11/14/23 1520   Wound Length (cm) 2 cm 01/15/24 1343   Wound Width (cm) 2 cm 01/15/24 1343   Wound Surface Area (cm^2) 4 cm^2 01/15/24 1343   Wound Depth (cm) 0.3 cm 11/14/23 1520   Wound Volume (cm^3) 2.52 cm^3 11/14/23 1520   State of Healing Non-healing;Slough 01/15/24 0400   Wound Bed Granulation (%) 0 % 11/14/23 1520   Wound Bed Slough (%) 10 % 11/14/23 1520   Wound Bed Eschar (%) 80 % 11/14/23 1520   Margins Not attached 11/14/23 1520   Drainage Description Yellow 01/15/24 0400   Drainage Amount Small 01/15/24 0400   Dressing Changed New 01/14/24 2100   Dressing Status New drainage 01/15/24 0400     Wound Team Summary Assessment: Notified Aashish Gerrick CNP of wound care recommendations    Right heel-Chronic unstageable PI  Irrigate with normal saline or wound  cleanser, Pat dry.  Paint with betadine iodine daily   Pad and protect with ABD, Kerlix and paper tape.        Wound Team Plan: Please re-consult wound/ostomy care for any changes or concerns    While in bed patient should only be on one fitted sheet, and one chux. Please, do not use brief while patient is resting in bed. Elevate heels off the bed surface at all times. Turn and reposition at least every 2 hours.     Thank you for this consultations, while inpatient please contact with any questions or changes in condition.       Rosaline Schwab RN BSN,[facility],CWN  [phone]   1/15/2024  5:10 PM

## 2024-01-15 NOTE — H&P
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  In ED CT scan was unrevealing for definitive source of infection but suggests bilateral pleural effusions some of which may be loculated.  He has been given abx and fluids but requires levophed prompting admission to the ICU for critical care needs.       TODAYS EVENTS    On exam he is awake, alert, and breathing comfortably on room air.  He is on some levophed and is mildly tachycardic but is now afebrile (Tmax was 39.6, last at 14:03 on 1/14).  His lactic acid is normal as is his WBC.  His viral panel was negative and he has blood cultures pending.  He was last dialyzed on Friday as his HCOs 25 with a K of 4.4.      This critically ill patient continues to be at-risk for clinically significant deterioration / failure due to the above mentioned dysfunctional, unstable organ systems.  I have personally identified and managed all complex critical care issues with efforts to prevent aforementioned clinical deterioration.  Critical care time is spent at bedside and/or the immediate area and has included, but is not limited to, the review of diagnostic tests, labs, radiographs, serial assessments of hemodynamics, respiratory status, ventilatory management, and family updates.  Time spent in procedures and teaching are reported  "separately.    CRITICAL CARE TIME: 75 min 1. Septic shock 2. ESRD     Central line present POA for dialysis   Sedation vacation n/a  Urinary catheter n/a  Restraints n/a      Past Medical History  Past Medical History:   Diagnosis Date    Essential (primary) hypertension 05/26/2017    HTN (hypertension), benign    Personal history of other endocrine, nutritional and metabolic disease 05/26/2017    History of diabetes mellitus       Surgical History  Past Surgical History:   Procedure Laterality Date    CT AORTA AND BILATERAL ILIOFEMORAL RUNOFF ANGIOGRAM W AND/OR WO IV CONTRAST  2/9/2023    CT AORTA AND BILATERAL ILIOFEMORAL RUNOFF ANGIOGRAM W AND/OR WO IV CONTRAST 2/9/2023 DOCTOR OFFICE [facility]    IR CVC EXCHANGE  11/13/2023    IR CVC EXCHANGE 11/13/2023 U CVEPINV        Social History  He reports that he has been smoking cigarettes. He has a 3.75 pack-year smoking history. He has never used smokeless tobacco. No history on file for alcohol use and drug use.    Family History  Family History   Problem Relation Name Age of Onset    Other (DIABETES DUE TO UNDERLYING CONDITION WITH MICROALBUMINURIA) Father      Other (DIABETES DUE TO UNDERLYING CONDITION WITH MICROALBUMINURIA [Other]) Other AUNT     Other (DIABETES DUE TO UNDERLYING CONDITION WITH MICROALBUMINURIA [Other]) Other GP         Allergies  Cashew nut      Last Recorded Vitals  Blood pressure 93/60, pulse (!) 114, temperature 39.5 °C (103.1 °F), resp. rate 18, height 1.753 m (5' 9\"), weight 65.8 kg (145 lb), SpO2 100 %.      Relevant Results    US gallbladder    Result Date: 1/14/2024  Interpreted By:  Houston Conde, STUDY: US GALLBLADDER;  1/14/2024 7:16 pm   INDICATION: Signs/Symptoms:Vomiting, fever, gallbladder edema on CT, assess for cholecystitis.   COMPARISON: None.   ACCESSION NUMBER(S): [id number]   ORDERING CLINICIAN: BANDAR JANG   TECHNIQUE: Multiple sonographic grayscale and color images of the right upper quadrant were performed.   FINDINGS: " The liver demonstrates normal echogenicity. There is gallbladder wall thickening measuring 10 mm in thickness and pericholecystic edema. No definite evidence of gallstones. Per the sonographer, sonographic Muñiz sign is absent. The common bile duct measures 3 mm in diameter.   There is limited evaluation of the pancreas secondary to shadowing from overlying bowel gas.   The right kidney measures 8.6 cm in length. No right hydronephrosis.       Prominent gallbladder wall thickening measuring 10 mm in thickness and pericholecystic edema. There is however no definite evidence of gallstones and per the sonographer, sonographic Muñiz sign is absent. The gallbladder wall thickening is therefore nonspecific and may relate to underlying liver disease, hypoproteinemia or cardiac disease. Clinical correlation is recommended and if there is concern for acalculous cholecystitis, HIDA scan may be obtained for further evaluation.   MACRO: None   Signed by: Houston Conde 1/14/2024 7:32 PM Dictation workstation:   [id number]    CT chest abdomen pelvis wo IV contrast    Result Date: 1/14/2024  Interpreted By:  Houston Conde, STUDY: CT CHEST ABDOMEN PELVIS WO CONTRAST;  1/14/2024 4:58 pm   INDICATION: Signs/Symptoms:sob and pain.   COMPARISON: 2/9/2023   ACCESSION NUMBER(S): [id number]   ORDERING CLINICIAN: RADHA VALDEZ   TECHNIQUE: Contiguous axial images of the chest, abdomen and pelvis were obtained without intravenous contrast. Coronal and sagittal reformatted images were obtained from the axial images.   FINDINGS: CT CHEST:   The examination is limited secondary lack of intravenous contrast.   There is limited evaluation of the vascular structures on the noncontrast examination. There is air in the left subclavian vasculature in the left arm and also vasculature in the imaged lower neck.  There is minimal air in the central pulmonary vein. There is also air in vasculature in the chest wall.   Hyperdensity in vasculature in  superior mediastinum may relate to vascular calcification.   Prominent lymph nodes in the imaged lower neck measures 1.4 cm mildly prominent axillary lymph nodes. Evaluation for mediastinal and hilar lymphadenopathy is limited on noncontrast examination. There are multiple prominent superior mediastinal lymph nodes which measure up to 1.8 cm. 1.9 cm subcarinal lymph node. There also prominent periaortic lymph nodes which measure up to 1.3 cm.   The heart is normal in size. Coronary artery atherosclerotic calcifications. There is moderate size left pleural effusion which appears partially loculated and small right pleural effusion. There is bibasilar atelectatic/consolidative opacity. There is a new 2 cm x 0.9 cm subpleural opacity in the right lower lobe with mild calcifications. No pneumothorax.   Mild bilateral gynecomastia.   No acute fracture of the thoracic spine.   CT ABDOMEN PELVIS:   Evaluation the abdomen pelvis is limited secondary to lack of intravenous contrast. Limited evaluation for liver mass on noncontrast examination. There is gallbladder wall thickening and pericholecystic edema.   Limited evaluation the pancreas secondary to lack of intravenous contrast. There is a tubular opacity measuring 5 mm in diameter the pancreatic tail may relate to distal pancreatic duct dilatation.   No splenomegaly.   The adrenal glands appear unremarkable.   There are multiple bilateral renal cysts and subcentimeter hypodensities too small to characterize. There are also several in indeterminate renal lesions may correspond to proteinaceous/hemorrhagic cysts however there is limited evaluation for renal mass on noncontrast examination. Bilateral renal vascular calcifications and nonobstructive calculi. No hydronephrosis.   Atherosclerotic calcification of the aorta and abdominal and pelvic vasculature.   There are multiple prominent retroperitoneal lymph nodes which are also seen on the prior examination and measured  1.4 cm enlarged right inguinal lymph node is stable measures 2.2 cm and 1.3 cm.   There is partially calcified density subcutaneous fat in the left anterior abdominal wall which is more density in comparison to prior examination. There is subcutaneous edema in the abdominal and pelvic wall. There is air in vasculature in the abdominal wall.   The stomach is underdistended and not well evaluated. No evidence of bowel obstruction. Evaluation the bowel is overall limited on the noncontrast examination.   Underdistention versus urinary bladder wall thickening.   There is a right femoral central venous catheter which terminates in the chest at level of the SVC.   Multilevel degenerative change of the lumbar spine and changes of renal osteodystrophy. Multilevel Schmorl's nodes.       1.   Air in vasculature in the chest, abdomen and pelvis as described above which may relate to vascular access, however clinical correlation is recommended if there is concern for other etiologies.   2. Moderate size partially loculated left pleural effusion and small right pleural effusion and bilateral atelectatic/consolidative opacities. New 2 cm x 0.9 cm subpleural opacity right lower lobe with mild calcifications.   3.  Right femoral central venous catheter which extends into the chest and terminates in the SVC.   4.  Lymphadenopathy in the imaged lower neck and mediastinal lymphadenopathy. Multiple prominent retroperitoneal lymph nodes are also noted and enlarged inguinal lymph nodes. The lymph nodes may be reactive in nature, however clinical correlation is recommended there is concern for malignancy or other etiology.   5.   Gallbladder wall thickening and pericholecystic edema is nonspecific and may relate to underlying liver disease, hypoproteinemia or cardiac disease, however if there is concern for acute cholecystitis/acute gallbladder pathology, right upper quadrant ultrasound is recommended for further evaluation.   6.   Low  attenuation tubular opacity in the pancreatic tail measuring 5 mm in diameter may correspond to dilated pancreatic duct and may be better assessed on follow-up contrast enhanced imaging.   7.  Bilateral renal atrophy and multiple bilateral renal cysts and subcentimeter hypodensities too small to characterize. Limited evaluation for renal mass on noncontrast examination.   8.  Extensive atherosclerotic calcification in the chest, abdomen and pelvis.   9.  Underdistention versus urinary bladder wall thickening; please correlate urinalysis to evaluate for cystitis.   MACRO: None   Signed by: Houston Conde 1/14/2024 6:05 PM Dictation workstation:   [id number]    XR chest 2 views    Result Date: 1/14/2024  Interpreted By:  Jarvis Holcomb, STUDY: Chest dated  1/14/2024.   INDICATION: Signs/Symptoms:pain   COMPARISON: Chest dated 11/10/2023.   ACCESSION NUMBER(S): [id number]   ORDERING CLINICIAN: RADHA VALDEZ   TECHNIQUE: One view of the chest.   FINDINGS: There is left basilar opacity with blunting of the costophrenic angle. Linear opacities are seen in the right lower lung zone.  No pneumothorax is evident. The cardiomediastinal silhouette is  not enlarged.Degenerative change is seen of the spine and shoulders.       1. Small left pleural effusion with associated atelectasis and/or pneumonitis. 2. Right basilar atelectasis and/or scarring.   MACRO: None   Signed by: Jarvis Holcomb 1/14/2024 2:51 PM Dictation workstation:   [id number]          Results for orders placed or performed during the hospital encounter of 01/14/24 (from the past 24 hour(s))   CBC and Auto Differential   Result Value Ref Range    WBC 10.7 4.4 - 11.3 x10*3/uL    nRBC 0.0 0.0 - 0.0 /100 WBCs    RBC 4.09 (L) 4.50 - 5.90 x10*6/uL    Hemoglobin 10.7 (L) 13.5 - 17.5 g/dL    Hematocrit 35.2 (L) 41.0 - 52.0 %    MCV 86 80 - 100 fL    MCH 26.2 26.0 - 34.0 pg    MCHC 30.4 (L) 32.0 - 36.0 g/dL    RDW 21.4 (H) 11.5 - 14.5 %    Platelets 190 150 - 450  x10*3/uL    Neutrophils % 85.3 40.0 - 80.0 %    Immature Granulocytes %, Automated 0.4 0.0 - 0.9 %    Lymphocytes % 7.7 13.0 - 44.0 %    Monocytes % 5.1 2.0 - 10.0 %    Eosinophils % 1.0 0.0 - 6.0 %    Basophils % 0.5 0.0 - 2.0 %    Neutrophils Absolute 9.12 (H) 1.20 - 7.70 x10*3/uL    Immature Granulocytes Absolute, Automated 0.04 0.00 - 0.70 x10*3/uL    Lymphocytes Absolute 0.82 (L) 1.20 - 4.80 x10*3/uL    Monocytes Absolute 0.54 0.10 - 1.00 x10*3/uL    Eosinophils Absolute 0.11 0.00 - 0.70 x10*3/uL    Basophils Absolute 0.05 0.00 - 0.10 x10*3/uL   Comprehensive Metabolic Panel   Result Value Ref Range    Glucose 85 74 - 99 mg/dL    Sodium 131 (L) 136 - 145 mmol/L    Potassium 4.4 3.5 - 5.3 mmol/L    Chloride 93 (L) 98 - 107 mmol/L    Bicarbonate 25 21 - 32 mmol/L    Anion Gap 17 10 - 20 mmol/L    Urea Nitrogen 27 (H) 6 - 23 mg/dL    Creatinine 7.74 (H) 0.50 - 1.30 mg/dL    eGFR 8 (L) >60 mL/min/1.73m*2    Calcium 9.8 8.6 - 10.3 mg/dL    Albumin 3.6 3.4 - 5.0 g/dL    Alkaline Phosphatase 183 (H) 33 - 120 U/L    Total Protein 8.3 (H) 6.4 - 8.2 g/dL    AST 13 9 - 39 U/L    Bilirubin, Total 0.6 0.0 - 1.2 mg/dL    ALT 10 10 - 52 U/L   Lactate   Result Value Ref Range    Lactate 1.6 0.4 - 2.0 mmol/L   Blood Gas Venous Full Panel   Result Value Ref Range    POCT pH, Venous 7.41 7.33 - 7.43 pH    POCT pCO2, Venous 46 41 - 51 mm Hg    POCT pO2, Venous 23 (L) 35 - 45 mm Hg    POCT SO2, Venous 26 (L) 45 - 75 %    POCT Oxy Hemoglobin, Venous 25.3 (L) 45.0 - 75.0 %    POCT Hematocrit Calculated, Venous 34.0 (L) 41.0 - 52.0 %    POCT Sodium, Venous 132 (L) 136 - 145 mmol/L    POCT Potassium, Venous 4.5 3.5 - 5.3 mmol/L    POCT Chloride, Venous 94 (L) 98 - 107 mmol/L    POCT Ionized Calicum, Venous 1.21 1.10 - 1.33 mmol/L    POCT Glucose, Venous 93 74 - 99 mg/dL    POCT Lactate, Venous 2.2 (H) 0.4 - 2.0 mmol/L    POCT Base Excess, Venous 3.9 (H) -2.0 - 3.0 mmol/L    POCT HCO3 Calculated, Venous 29.2 (H) 22.0 - 26.0 mmol/L     POCT Hemoglobin, Venous 11.4 (L) 13.5 - 17.5 g/dL    POCT Anion Gap, Venous 13.0 10.0 - 25.0 mmol/L    Patient Temperature 37.0 degrees Celsius    FiO2 21 %   Blood Culture    Specimen: Peripheral Venipuncture; Blood culture   Result Value Ref Range    Blood Culture Loaded on Instrument - Culture in progress    Blood Culture    Specimen: Peripheral Venipuncture; Blood culture   Result Value Ref Range    Blood Culture Loaded on Instrument - Culture in progress    Morphology   Result Value Ref Range    RBC Morphology See Below     Hypochromia Marked     Target Cells Few     Ovalocytes Few     Clumped Platelets Present    Sars-CoV-2 PCR, Symptomatic   Result Value Ref Range    Coronavirus 2019, PCR Not Detected Not Detected   Influenza A, and B PCR   Result Value Ref Range    Flu A Result Not Detected Not Detected    Flu B Result Not Detected Not Detected   RSV PCR   Result Value Ref Range    RSV PCR Not Detected Not Detected   Blood Gas Lactic Acid, Venous   Result Value Ref Range    POCT Lactate, Venous 1.7 0.4 - 2.0 mmol/L     Scheduled medications  heparin (porcine), 5,000 Units, subcutaneous, q8h      Continuous medications  norepinephrine, 0.01-0.5 mcg/kg/min, Last Rate: 0.06 mcg/kg/min (01/14/24 1928)  sodium chloride 0.9%, 100 mL/hr, Last Rate: Stopped (01/14/24 1700)      PRN medications      Shavonne Bullock MD

## 2024-01-15 NOTE — PROGRESS NOTES
Vancomycin Dosing by Pharmacy- FOLLOW UP    Edwar Hemphill is a 38 y.o. year old male who Pharmacy has been consulted for vancomycin dosing for pneumonia. Based on the patient's indication and renal status this patient is being dosed based on a goal AUC of 400-600.     MRSA nasal swab is PENDING collection.     Patient receives HD on M/W/F.    Current vancomycin dose: DOSED BY LEVEL    Visit Vitals  /75   Pulse 103   Temp 38.2 °C (100.8 °F)   Resp 21        Lab Results   Component Value Date    CREATININE 8.25 (H) 01/15/2024    CREATININE 7.74 (H) 01/14/2024    CREATININE 4.39 (H) 11/16/2023    CREATININE 5.98 (H) 11/15/2023        Lab Results   Component Value Date    PATIENTTEMP 37.0 01/14/2024    PATIENTTEMP 37.0 07/09/2023    PATIENTTEMP 37.0 07/08/2023    PATIENTTEMP 37.0 07/08/2023        Assessment/Plan    Patient will receive a dose of 750 mg after dialysis today.   The next level will has been ordered with AM labs on 1/17/24. May be obtained sooner if clinically indicated.   Will continue to monitor renal function daily while on vancomycin and order serum creatinine at least every 48 hours if not already ordered.  Follow for continued vancomycin needs, clinical response, and signs/symptoms of toxicity.     Stefanie Rodríguez, PharmD

## 2024-01-15 NOTE — CONSULTS
Consults    Reason For Consult  Tunneled HD line exchange.     History Of Present Illness  Edwar Hemphill is a 38 y.o. male presenting with a past medical history of DM, HTN, ESRD on HD via right femoral HD line (most recent exchange was 11/13/2023 with Dr. Dempsey), PAD s/p left AKA. Presents to [facility] with bacteremia, fever and SOB. Admitted to ICU on pressors for BP support. BC X 2 are positive for staph aureus. Team is requesting a line holiday.      Past Medical History  He has a past medical history of Essential (primary) hypertension (05/26/2017) and Personal history of other endocrine, nutritional and metabolic disease (05/26/2017).    Surgical History  He has a past surgical history that includes CT angio aorta and bilateral iliofemoral runoff w and or wo IV contrast (2/9/2023) and IR CVC exchange (11/13/2023).     Social History  He reports that he has been smoking cigarettes. He has a 3.75 pack-year smoking history. He has never used smokeless tobacco. No history on file for alcohol use and drug use.    Family History  Family History   Problem Relation Name Age of Onset    Other (DIABETES DUE TO UNDERLYING CONDITION WITH MICROALBUMINURIA) Father      Other (DIABETES DUE TO UNDERLYING CONDITION WITH MICROALBUMINURIA [Other]) Other AUNT     Other (DIABETES DUE TO UNDERLYING CONDITION WITH MICROALBUMINURIA [Other]) Other GP         Allergies  Cashew nut    MEDS:    Current Facility-Administered Medications:     acetaminophen (Tylenol) tablet 650 mg, 650 mg, oral, q6h PRN, Aashish Doll, APRN-CNP, 650 mg at 01/15/24 0856    bisacodyl (Dulcolax) EC tablet 5 mg, 5 mg, oral, Daily PRN, Aashish Doll APRN-CNP    epoetin tyson-epbx (Retacrit) injection 6,000 Units, 6,000 Units, subcutaneous, Once per day on Mon Wed Fri, Zeeshan Gonzalez MD    heparin (porcine) injection 5,000 Units, 5,000 Units, subcutaneous, q8h, Shavonne Bullock MD, 5,000 Units at 01/15/24 0627    heparin flush 10 unit/mL syringe 10 Units, 10 Units,  intra-catheter, PRN, Shavonne Bullock MD, 10 Units at 01/15/24 0627    norepinephrine (Levophed) 8 mg in dextrose 5% 250 mL (0.032 mg/mL) infusion (premix), 0.01-0.5 mcg/kg/min, intravenous, Continuous, Surendra Luu MD, Stopped at 01/15/24 0415    piperacillin-tazobactam-dextrose (Zosyn) IV 2.25 g, 2.25 g, intravenous, q8h, Shavonne Bullock MD, Stopped at 01/15/24 0657    vancomycin (Vancocin) placeholder, , miscellaneous, Daily PRN, Wali Chan, PharmD    vancomycin in dextrose 5 % (Vancocin) IVPB 750 mg, 750 mg, intravenous, Once, Stefanie Rodríguez, PharmD    Review of Systems  General ROS: negative  Respiratory ROS: no cough, shortness of breath, or wheezing  Cardiovascular ROS: no chest pain or dyspnea on exertion  Gastrointestinal ROS: no abdominal pain, change in bowel habits, or black or bloody stools  Neurological ROS: negative     Last Recorded Vitals  /83   Pulse 92   Temp 36 °C (96.8 °F) (Temporal)   Resp (!) 9   Wt 74.2 kg (163 lb 9.3 oz)   SpO2 99%      Physical Exam  Orientation: oriented to person, place, time, and general circumstances  HEENT: normocephalic, atraumatic  Pulm: clear to auscultation bilaterally, no wheezes, good air entry  Cardiac: Regular rate and rhythm or without murmur or extra heart sounds  GI: soft, nontender, normal bowel sounds  Pulses: peripheral pulses symmetrical    Relevant Results    LABS:  Lab Results   Component Value Date    WBC 8.1 01/15/2024    HGB 10.0 (L) 01/15/2024    HCT 32.5 (L) 01/15/2024    MCV 86 01/15/2024     (L) 01/15/2024      Results from last 72 hours   Lab Units 01/15/24  0632   SODIUM mmol/L 130*   POTASSIUM mmol/L 4.4   CHLORIDE mmol/L 95*   CO2 mmol/L 22   BUN mg/dL 30*   CREATININE mg/dL 8.25*   GLUCOSE mg/dL 143*   CALCIUM mg/dL 8.8   ANION GAP mmol/L 17   EGFR mL/min/1.73m*2 8*     Results from last 72 hours   Lab Units 01/15/24  0632 01/14/24  1319   ALK PHOS U/L  --  183*   BILIRUBIN TOTAL mg/dL  --  0.6   PROTEIN TOTAL g/dL  --   "8.3*   ALT U/L  --  10   AST U/L  --  13   ALBUMIN g/dL 2.9* 3.6           No lab exists for component: \"PT\"    MICRO:  Susceptibility data from last 14 days.  Collected Specimen Info Organism   01/14/24 Blood culture from Peripheral Venipuncture Staphylococcus aureus   01/14/24 Blood culture from Peripheral Venipuncture Staphylococcus aureus       IMAGING:   US gallbladder   Final Result   Prominent gallbladder wall thickening measuring 10 mm in thickness   and pericholecystic edema. There is however no definite evidence of   gallstones and per the sonographer, sonographic Muñiz sign is   absent. The gallbladder wall thickening is therefore nonspecific and   may relate to underlying liver disease, hypoproteinemia or cardiac   disease. Clinical correlation is recommended and if there is concern   for acalculous cholecystitis, HIDA scan may be obtained for further   evaluation.        MACRO:   None        Signed by: Houston Conde 1/14/2024 7:32 PM   Dictation workstation:   [id number]      CT chest abdomen pelvis wo IV contrast   Final Result   1.   Air in vasculature in the chest, abdomen and pelvis as described   above which may relate to vascular access, however clinical   correlation is recommended if there is concern for other etiologies.        2. Moderate size partially loculated left pleural effusion and small   right pleural effusion and bilateral atelectatic/consolidative   opacities. New 2 cm x 0.9 cm subpleural opacity right lower lobe with   mild calcifications.        3.  Right femoral central venous catheter which extends into the   chest and terminates in the SVC.        4.  Lymphadenopathy in the imaged lower neck and mediastinal   lymphadenopathy. Multiple prominent retroperitoneal lymph nodes are   also noted and enlarged inguinal lymph nodes. The lymph nodes may be   reactive in nature, however clinical correlation is recommended there   is concern for malignancy or other etiology.        5.   " Gallbladder wall thickening and pericholecystic edema is   nonspecific and may relate to underlying liver disease,   hypoproteinemia or cardiac disease, however if there is concern for   acute cholecystitis/acute gallbladder pathology, right upper quadrant   ultrasound is recommended for further evaluation.        6.   Low attenuation tubular opacity in the pancreatic tail measuring   5 mm in diameter may correspond to dilated pancreatic duct and may be   better assessed on follow-up contrast enhanced imaging.        7.  Bilateral renal atrophy and multiple bilateral renal cysts and   subcentimeter hypodensities too small to characterize. Limited   evaluation for renal mass on noncontrast examination.        8.  Extensive atherosclerotic calcification in the chest, abdomen and   pelvis.        9.  Underdistention versus urinary bladder wall thickening; please   correlate urinalysis to evaluate for cystitis.        MACRO:   None        Signed by: Houston Conde 1/14/2024 6:05 PM   Dictation workstation:   [id number]      XR chest 2 views   Final Result   1. Small left pleural effusion with associated atelectasis and/or   pneumonitis.   2. Right basilar atelectasis and/or scarring.        MACRO:   None        Signed by: Jarvis Holcomb 1/14/2024 2:51 PM   Dictation workstation:   [id number]      Transthoracic Echo (TTE) Complete    (Results Pending)   IR CVC PICC    (Results Pending)            Assessment/Plan     This is a 38 y.o. male presenting with a past medical history of DM, HTN, ESRD on HD via right femoral HD line (most recent exchange was 11/13/2023 with Dr. Dempsey), PAD s/p left AKA. Presents to [facility] [place] with bacteremia, fever and SOB. Admitted to ICU on pressors for BP support. BC X 2 are positive for staph aureus. Team is requesting a line holiday.     Patient has limited access and what appears to have illeocaval stenosis adjacent to his current THD line. Could consider placing contralateral left groin temp  line prior to removing the right fem line to ensure access on the left is possible. Patient still in dialysis currently. Will plan on HD line intervention tomorrow pending IR schedule. Please keep NPO after midnight.     Risks were discussed including but not limited to pain at insertion site, infection, bleeding, hematoma, and air embolism. Benefits of the procedure and alternatives to treatment were also reviewed. Patient verbalizes understanding and wishes to proceed. They will further discuss with IR attending prior to procedure.       Henry Clemens, APRN-CNP    Time : Billing Time  Prep time on date of the patient encounter: 15 minutes.   Time spent directly with patient/family/caregiver: 15 minutes.   Documentation time: 15 minutes.   Total time (minutes):  45 minutes  Time Spent with this Patient (minutes).  More than 50% of This Time was Spent in Counseling and/or Coordination of Care

## 2024-01-15 NOTE — PROGRESS NOTES
"Edwar Hemphill is a 38 y.o. male on day 1 of admission presenting with Septic shock (CMS/HCC).    Subjective   CANDELARIO reported  Afebrile  Weaned off pressor support       Objective     Physical Exam  Constitutional:       Appearance: Normal appearance.   Cardiovascular:      Rate and Rhythm: Regular rhythm. Tachycardia present.      Pulses: Normal pulses.   Pulmonary:      Effort: Pulmonary effort is normal.   Abdominal:      General: There is no distension.      Palpations: Abdomen is soft.      Tenderness: There is no abdominal tenderness.   Musculoskeletal:      Comments: RUE amputation  LLE AKA, stump incision appears healed and no drainage or erythema noted.    Skin:     General: Skin is warm and dry.      Capillary Refill: Capillary refill takes less than 2 seconds.   Neurological:      General: No focal deficit present.      Mental Status: He is alert and oriented to person, place, and time. Mental status is at baseline.   Psychiatric:         Mood and Affect: Mood normal.         Last Recorded Vitals  Blood pressure 116/85, pulse (!) 112, temperature 35.8 °C (96.4 °F), temperature source Temporal, resp. rate 23, height 1.753 m (5' 9\"), weight 74.2 kg (163 lb 9.3 oz), SpO2 100 %.  Intake/Output last 3 Shifts:  I/O last 3 completed shifts:  In: 2504.3 (33.8 mL/kg) [I.V.:54.3 (0.7 mL/kg); IV Piggyback:2450]  Out: - (0 mL/kg)   Weight: 74.2 kg     Relevant Results  Scheduled medications  heparin (porcine), 5,000 Units, subcutaneous, q8h  piperacillin-tazobactam, 2.25 g, intravenous, q8h      Continuous medications  norepinephrine, 0.01-0.5 mcg/kg/min, Last Rate: Stopped (01/15/24 2665)      PRN medications  PRN medications: acetaminophen, heparin flush, vancomycin    Results for orders placed or performed during the hospital encounter of 01/14/24 (from the past 24 hour(s))   CBC and Auto Differential   Result Value Ref Range    WBC 10.7 4.4 - 11.3 x10*3/uL    nRBC 0.0 0.0 - 0.0 /100 WBCs    RBC 4.09 (L) 4.50 - 5.90 " x10*6/uL    Hemoglobin 10.7 (L) 13.5 - 17.5 g/dL    Hematocrit 35.2 (L) 41.0 - 52.0 %    MCV 86 80 - 100 fL    MCH 26.2 26.0 - 34.0 pg    MCHC 30.4 (L) 32.0 - 36.0 g/dL    RDW 21.4 (H) 11.5 - 14.5 %    Platelets 190 150 - 450 x10*3/uL    Neutrophils % 85.3 40.0 - 80.0 %    Immature Granulocytes %, Automated 0.4 0.0 - 0.9 %    Lymphocytes % 7.7 13.0 - 44.0 %    Monocytes % 5.1 2.0 - 10.0 %    Eosinophils % 1.0 0.0 - 6.0 %    Basophils % 0.5 0.0 - 2.0 %    Neutrophils Absolute 9.12 (H) 1.20 - 7.70 x10*3/uL    Immature Granulocytes Absolute, Automated 0.04 0.00 - 0.70 x10*3/uL    Lymphocytes Absolute 0.82 (L) 1.20 - 4.80 x10*3/uL    Monocytes Absolute 0.54 0.10 - 1.00 x10*3/uL    Eosinophils Absolute 0.11 0.00 - 0.70 x10*3/uL    Basophils Absolute 0.05 0.00 - 0.10 x10*3/uL   Comprehensive Metabolic Panel   Result Value Ref Range    Glucose 85 74 - 99 mg/dL    Sodium 131 (L) 136 - 145 mmol/L    Potassium 4.4 3.5 - 5.3 mmol/L    Chloride 93 (L) 98 - 107 mmol/L    Bicarbonate 25 21 - 32 mmol/L    Anion Gap 17 10 - 20 mmol/L    Urea Nitrogen 27 (H) 6 - 23 mg/dL    Creatinine 7.74 (H) 0.50 - 1.30 mg/dL    eGFR 8 (L) >60 mL/min/1.73m*2    Calcium 9.8 8.6 - 10.3 mg/dL    Albumin 3.6 3.4 - 5.0 g/dL    Alkaline Phosphatase 183 (H) 33 - 120 U/L    Total Protein 8.3 (H) 6.4 - 8.2 g/dL    AST 13 9 - 39 U/L    Bilirubin, Total 0.6 0.0 - 1.2 mg/dL    ALT 10 10 - 52 U/L   Lactate   Result Value Ref Range    Lactate 1.6 0.4 - 2.0 mmol/L   Blood Gas Venous Full Panel   Result Value Ref Range    POCT pH, Venous 7.41 7.33 - 7.43 pH    POCT pCO2, Venous 46 41 - 51 mm Hg    POCT pO2, Venous 23 (L) 35 - 45 mm Hg    POCT SO2, Venous 26 (L) 45 - 75 %    POCT Oxy Hemoglobin, Venous 25.3 (L) 45.0 - 75.0 %    POCT Hematocrit Calculated, Venous 34.0 (L) 41.0 - 52.0 %    POCT Sodium, Venous 132 (L) 136 - 145 mmol/L    POCT Potassium, Venous 4.5 3.5 - 5.3 mmol/L    POCT Chloride, Venous 94 (L) 98 - 107 mmol/L    POCT Ionized Calicum, Venous 1.21  1.10 - 1.33 mmol/L    POCT Glucose, Venous 93 74 - 99 mg/dL    POCT Lactate, Venous 2.2 (H) 0.4 - 2.0 mmol/L    POCT Base Excess, Venous 3.9 (H) -2.0 - 3.0 mmol/L    POCT HCO3 Calculated, Venous 29.2 (H) 22.0 - 26.0 mmol/L    POCT Hemoglobin, Venous 11.4 (L) 13.5 - 17.5 g/dL    POCT Anion Gap, Venous 13.0 10.0 - 25.0 mmol/L    Patient Temperature 37.0 degrees Celsius    FiO2 21 %   Blood Culture    Specimen: Peripheral Venipuncture; Blood culture   Result Value Ref Range    Blood Culture       Identification and susceptibility testing to follow    Gram Stain Gram positive cocci, clusters (AA)    Blood Culture    Specimen: Peripheral Venipuncture; Blood culture   Result Value Ref Range    Blood Culture       Identification and susceptibility testing to follow    Gram Stain Gram positive cocci, clusters (AA)    Morphology   Result Value Ref Range    RBC Morphology See Below     Hypochromia Marked     Target Cells Few     Ovalocytes Few     Clumped Platelets Present    Sars-CoV-2 PCR, Symptomatic   Result Value Ref Range    Coronavirus 2019, PCR Not Detected Not Detected   Influenza A, and B PCR   Result Value Ref Range    Flu A Result Not Detected Not Detected    Flu B Result Not Detected Not Detected   RSV PCR   Result Value Ref Range    RSV PCR Not Detected Not Detected   ECG 12 Lead   Result Value Ref Range    Ventricular Rate 150 BPM    Atrial Rate 150 BPM    NJ Interval 130 ms    QRS Duration 64 ms    QT Interval 256 ms    QTC Calculation(Bazett) 404 ms    P Axis 44 degrees    R Axis 72 degrees    T Axis 32 degrees    QRS Count 25 beats    Q Onset 227 ms    P Onset 162 ms    P Offset 207 ms    T Offset 355 ms    QTC Fredericia 347 ms   Blood Gas Lactic Acid, Venous   Result Value Ref Range    POCT Lactate, Venous 1.7 0.4 - 2.0 mmol/L   POCT GLUCOSE   Result Value Ref Range    POCT Glucose 119 (H) 74 - 99 mg/dL   Renal function panel   Result Value Ref Range    Glucose 143 (H) 74 - 99 mg/dL    Sodium 130 (L) 136 -  145 mmol/L    Potassium 4.4 3.5 - 5.3 mmol/L    Chloride 95 (L) 98 - 107 mmol/L    Bicarbonate 22 21 - 32 mmol/L    Anion Gap 17 10 - 20 mmol/L    Urea Nitrogen 30 (H) 6 - 23 mg/dL    Creatinine 8.25 (H) 0.50 - 1.30 mg/dL    eGFR 8 (L) >60 mL/min/1.73m*2    Calcium 8.8 8.6 - 10.3 mg/dL    Phosphorus 5.6 (H) 2.5 - 4.9 mg/dL    Albumin 2.9 (L) 3.4 - 5.0 g/dL   CBC   Result Value Ref Range    WBC 8.1 4.4 - 11.3 x10*3/uL    nRBC 0.0 0.0 - 0.0 /100 WBCs    RBC 3.76 (L) 4.50 - 5.90 x10*6/uL    Hemoglobin 10.0 (L) 13.5 - 17.5 g/dL    Hematocrit 32.5 (L) 41.0 - 52.0 %    MCV 86 80 - 100 fL    MCH 26.6 26.0 - 34.0 pg    MCHC 30.8 (L) 32.0 - 36.0 g/dL    RDW 21.5 (H) 11.5 - 14.5 %    Platelets 136 (L) 150 - 450 x10*3/uL     ECG 12 Lead    Result Date: 1/15/2024  See ED provider note for full interpretation and clinical correlation    US gallbladder    Result Date: 1/14/2024  Interpreted By:  Houston Conde, STUDY: US GALLBLADDER;  1/14/2024 7:16 pm   INDICATION: Signs/Symptoms:Vomiting, fever, gallbladder edema on CT, assess for cholecystitis.   COMPARISON: None.   ACCESSION NUMBER(S): [id number]   ORDERING CLINICIAN: BANDAR JANG   TECHNIQUE: Multiple sonographic grayscale and color images of the right upper quadrant were performed.   FINDINGS: The liver demonstrates normal echogenicity. There is gallbladder wall thickening measuring 10 mm in thickness and pericholecystic edema. No definite evidence of gallstones. Per the sonographer, sonographic Muñiz sign is absent. The common bile duct measures 3 mm in diameter.   There is limited evaluation of the pancreas secondary to shadowing from overlying bowel gas.   The right kidney measures 8.6 cm in length. No right hydronephrosis.       Prominent gallbladder wall thickening measuring 10 mm in thickness and pericholecystic edema. There is however no definite evidence of gallstones and per the sonographer, sonographic Muñiz sign is absent. The gallbladder wall thickening is  therefore nonspecific and may relate to underlying liver disease, hypoproteinemia or cardiac disease. Clinical correlation is recommended and if there is concern for acalculous cholecystitis, HIDA scan may be obtained for further evaluation.   MACRO: None   Signed by: Houston Conde 1/14/2024 7:32 PM Dictation workstation:   [id number]    CT chest abdomen pelvis wo IV contrast    Result Date: 1/14/2024  Interpreted By:  Houston Conde, STUDY: CT CHEST ABDOMEN PELVIS WO CONTRAST;  1/14/2024 4:58 pm   INDICATION: Signs/Symptoms:sob and pain.   COMPARISON: 2/9/2023   ACCESSION NUMBER(S): [id number]   ORDERING CLINICIAN: RADHA VALDEZ   TECHNIQUE: Contiguous axial images of the chest, abdomen and pelvis were obtained without intravenous contrast. Coronal and sagittal reformatted images were obtained from the axial images.   FINDINGS: CT CHEST:   The examination is limited secondary lack of intravenous contrast.   There is limited evaluation of the vascular structures on the noncontrast examination. There is air in the left subclavian vasculature in the left arm and also vasculature in the imaged lower neck.  There is minimal air in the central pulmonary vein. There is also air in vasculature in the chest wall.   Hyperdensity in vasculature in superior mediastinum may relate to vascular calcification.   Prominent lymph nodes in the imaged lower neck measures 1.4 cm mildly prominent axillary lymph nodes. Evaluation for mediastinal and hilar lymphadenopathy is limited on noncontrast examination. There are multiple prominent superior mediastinal lymph nodes which measure up to 1.8 cm. 1.9 cm subcarinal lymph node. There also prominent periaortic lymph nodes which measure up to 1.3 cm.   The heart is normal in size. Coronary artery atherosclerotic calcifications. There is moderate size left pleural effusion which appears partially loculated and small right pleural effusion. There is bibasilar atelectatic/consolidative  opacity. There is a new 2 cm x 0.9 cm subpleural opacity in the right lower lobe with mild calcifications. No pneumothorax.   Mild bilateral gynecomastia.   No acute fracture of the thoracic spine.   CT ABDOMEN PELVIS:   Evaluation the abdomen pelvis is limited secondary to lack of intravenous contrast. Limited evaluation for liver mass on noncontrast examination. There is gallbladder wall thickening and pericholecystic edema.   Limited evaluation the pancreas secondary to lack of intravenous contrast. There is a tubular opacity measuring 5 mm in diameter the pancreatic tail may relate to distal pancreatic duct dilatation.   No splenomegaly.   The adrenal glands appear unremarkable.   There are multiple bilateral renal cysts and subcentimeter hypodensities too small to characterize. There are also several in indeterminate renal lesions may correspond to proteinaceous/hemorrhagic cysts however there is limited evaluation for renal mass on noncontrast examination. Bilateral renal vascular calcifications and nonobstructive calculi. No hydronephrosis.   Atherosclerotic calcification of the aorta and abdominal and pelvic vasculature.   There are multiple prominent retroperitoneal lymph nodes which are also seen on the prior examination and measured 1.4 cm enlarged right inguinal lymph node is stable measures 2.2 cm and 1.3 cm.   There is partially calcified density subcutaneous fat in the left anterior abdominal wall which is more density in comparison to prior examination. There is subcutaneous edema in the abdominal and pelvic wall. There is air in vasculature in the abdominal wall.   The stomach is underdistended and not well evaluated. No evidence of bowel obstruction. Evaluation the bowel is overall limited on the noncontrast examination.   Underdistention versus urinary bladder wall thickening.   There is a right femoral central venous catheter which terminates in the chest at level of the SVC.   Multilevel  degenerative change of the lumbar spine and changes of renal osteodystrophy. Multilevel Schmorl's nodes.       1.   Air in vasculature in the chest, abdomen and pelvis as described above which may relate to vascular access, however clinical correlation is recommended if there is concern for other etiologies.   2. Moderate size partially loculated left pleural effusion and small right pleural effusion and bilateral atelectatic/consolidative opacities. New 2 cm x 0.9 cm subpleural opacity right lower lobe with mild calcifications.   3.  Right femoral central venous catheter which extends into the chest and terminates in the SVC.   4.  Lymphadenopathy in the imaged lower neck and mediastinal lymphadenopathy. Multiple prominent retroperitoneal lymph nodes are also noted and enlarged inguinal lymph nodes. The lymph nodes may be reactive in nature, however clinical correlation is recommended there is concern for malignancy or other etiology.   5.   Gallbladder wall thickening and pericholecystic edema is nonspecific and may relate to underlying liver disease, hypoproteinemia or cardiac disease, however if there is concern for acute cholecystitis/acute gallbladder pathology, right upper quadrant ultrasound is recommended for further evaluation.   6.   Low attenuation tubular opacity in the pancreatic tail measuring 5 mm in diameter may correspond to dilated pancreatic duct and may be better assessed on follow-up contrast enhanced imaging.   7.  Bilateral renal atrophy and multiple bilateral renal cysts and subcentimeter hypodensities too small to characterize. Limited evaluation for renal mass on noncontrast examination.   8.  Extensive atherosclerotic calcification in the chest, abdomen and pelvis.   9.  Underdistention versus urinary bladder wall thickening; please correlate urinalysis to evaluate for cystitis.   MACRO: None   Signed by: Houston Conde 1/14/2024 6:05 PM Dictation workstation:   [id number]    XR chest 2  views    Result Date: 1/14/2024  Interpreted By:  Jarvis Holcomb, STUDY: Chest dated  1/14/2024.   INDICATION: Signs/Symptoms:pain   COMPARISON: Chest dated 11/10/2023.   ACCESSION NUMBER(S): [id number]   ORDERING CLINICIAN: RADHA AVLDEZ   TECHNIQUE: One view of the chest.   FINDINGS: There is left basilar opacity with blunting of the costophrenic angle. Linear opacities are seen in the right lower lung zone.  No pneumothorax is evident. The cardiomediastinal silhouette is  not enlarged.Degenerative change is seen of the spine and shoulders.       1. Small left pleural effusion with associated atelectasis and/or pneumonitis. 2. Right basilar atelectasis and/or scarring.   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  In ED CT scan was unrevealing for definitive source of infection but suggests bilateral pleural effusions some of which may be loculated.  He has been given abx and fluids but requires levophed prompting admission to the ICU for critical care needs.        Plan:    Neuro:   - Neuro and pain assessment per protocol  - Tylenol as needed for acute pain    CV:  Hypotension likely 2/2 sepsis, requiring vasopressor support, currently weaned off  - NIBP and tele monitoring  - IVF boluses as needed  - Wean pressors and maintain MAP >65    Pulm:  - Sats >90%  - Bronch hygiene    GI:  - Regular diet  - PPI  - As needed bowel regimen    Renal:   ESRD on IHD  R Femoral tunneled catheter  - Nephrology  consulted  - Daily RFP and replete electrolytes as needed  - IHD per nephrology  - Renvela with meals    Endo:   IDDM2  - Hold home lantus  - ISS    Heme:   - Heparin for DVTppx  - Daily CBC    ID:   Febrile, no leukocytosis, however, it is trending up  BC + gram + cocci in clusters  - Continue vancomycin and zosyn  - ID consulted  - IR consulted for removal of right femoral dialysis catheter following dialysis.    Dispo: Keep in ICU    Discussed with Dr. Cota and team during MDRs.        I spent 60 minutes in the professional and overall care of this patient.      Aashish Doll, APRN-CNP

## 2024-01-15 NOTE — PRE-PROCEDURE NOTE
Report from Sending RN:    Report From:Delvin James  Recent Surgery of Procedure: No  Baseline Level of Consciousness (LOC): Alert and stable  Oxygen Use: Yes  Type: nasal  Diabetic: Yes  Last BP Med Given Day of Dialysis: 120/56    Last Pain Med Given: see emar  Lab Tests to be Obtained with Dialysis: Yes  Blood Transfusion to be Given During Dialysis: No  Available IV Access: Yes  Medications to be Administered During Dialysis: N/A  Continuous IV Infusion Running: No  Restraints on Currently or in the Last 24 Hours: No  Hand-Off Communication:

## 2024-01-15 NOTE — PROGRESS NOTES
Report to Receiving RN:    Report To: Delvin James  Time Report Called 1800  Hand-Off Communication:  B/P 114/78 H/R 100 pt removed 900L  Complications During Treatment: No  Ultrafiltration Treatment: No  Medications Administered During Dialysis: No  Blood Products Administered During Dialysis: No  Labs Sent During Dialysis: Yes  Heparin Drip Rate Changes: No    Electronic Signatures: Toney Fitzpatrick Authored:    (Signed )   Authored:     Last Updated: 6:32 PM by TONEY FITZPATRICK

## 2024-01-15 NOTE — ED PROCEDURE NOTE
Procedure  Central Line    Performed by: Gracie Murguia DO  Authorized by: Surendra Luu MD    Consent:     Consent obtained:  Verbal    Consent given by:  Patient    Risks, benefits, and alternatives were discussed: yes    Universal protocol:     Patient identity confirmed:  Verbally with patient  Pre-procedure details:     Indication(s): central venous access, hemodynamic monitoring and insufficient peripheral access      Hand hygiene: Hand hygiene performed prior to insertion      Sterile barrier technique: All elements of maximal sterile technique followed      Skin preparation:  Chlorhexidine    Skin preparation agent: Skin preparation agent completely dried prior to procedure    Anesthesia:     Anesthesia method:  Local infiltration    Local anesthetic:  Lidocaine 1% w/o epi  Procedure details:     Location:  R internal jugular    Site selection rationale:  Least scar tissue    Patient position:  Reverse Trendelenburg    Procedural supplies:  Triple lumen    Catheter size:  8 Fr    Landmarks identified: yes      Ultrasound guidance: yes      Ultrasound guidance timing: prior to insertion and real time      Sterile ultrasound techniques: Sterile gel and sterile probe covers were used      Number of attempts:  1    Successful placement: no    Post-procedure details:     Post-procedure:  Dressing applied    Procedure completion:  Procedure terminated electively by provider    Complications:  Venous blood return was obtained under ultrasound guidance and wire was passed without resistance to about 50% on wire length.  At that point wire began to meet resistance.  Swelling was noted on the neck when checking with ultrasound it appears that wire had exited the vessel.  Wire was removed and pressure was held for greater than 5 minutes.  Comments:      2 cc of lidocaine was also applied to left lateral neck without puncture attempt for central line.  Central Line    Performed by: Gracie Murguia DO  Authorized by:  Surendra Luu MD    Consent:     Consent obtained:  Verbal    Consent given by:  Patient    Risks, benefits, and alternatives were discussed: yes    Universal protocol:     Patient identity confirmed:  Verbally with patient  Pre-procedure details:     Indication(s): central venous access and hemodynamic monitoring      Hand hygiene: Hand hygiene performed prior to insertion      Sterile barrier technique: All elements of maximal sterile technique followed      Skin preparation:  Chlorhexidine    Skin preparation agent: Skin preparation agent completely dried prior to procedure    Anesthesia:     Anesthesia method:  Local infiltration    Local anesthetic:  Lidocaine 1% w/o epi  Procedure details:     Location:  L femoral    Site selection rationale:  Patient has right femoral dialysis line in place.  Right IJ not accessible and left IJ with significant scar tissue    Patient position:  Supine    Procedural supplies:  Triple lumen    Catheter size:  8 Fr    Landmarks identified: yes      Ultrasound guidance: yes      Sterile ultrasound techniques: Sterile gel and sterile probe covers were used      Number of attempts:  2    Successful placement: no    Post-procedure details:     Post-procedure:  Dressing applied    Procedure completion:  Procedure terminated electively by provider    Complications:  Venous blood return was obtained on vessel puncture and wire was passed freely.  Upon attempt to dilate significant scar tissue was hit.  Was unable to dilate fully to the vessel.  Unable to pass the catheter over the wire.  With hard end feel on attempt to pass, procedure was aborted and pressure was held for greater than 10 minutes.  Comments:      Gracie Murguia DO  PGY-2 Emergency Medicine                 Gracie Murguia DO  Resident  01/14/24 1800

## 2024-01-15 NOTE — PROGRESS NOTES
Pharmacy Medication History Review    Edwar Hemphill is a 38 y.o. male admitted for Septic shock (CMS/[facility]). Pharmacy reviewed the patient's [id number] medications and allergies for accuracy.    The list below reflectives the updated PTA list. Please review each medication in order reconciliation for additional clarification and justification.  Medications Prior to Admission   Medication Sig Dispense Refill Last Dose    acetaminophen (Tylenol) 325 mg tablet Take 2 tablets (650 mg) by mouth every 4 hours if needed for moderate pain (4 - 6). 30 tablet 0     B complex-vitamin C-folic acid (Nephrocaps) 1 mg capsule Take 1 capsule by mouth once daily.       bisacodyl (Dulcolax, bisacodyl,) 10 mg suppository Insert into the rectum once daily as needed for constipation.       insulin glargine (Lantus) 100 unit/mL injection Inject 10 Units under the skin once daily at bedtime. Take as directed per insulin instructions. 3 mL 11     insulin lispro (HumaLOG) 100 unit/mL injection Inject 0-0.15 mL (0-15 Units) under the skin 3 times a day with meals. Take as directed per insulin instructions. Do not start before November 17, 2023. (Patient taking differently: Inject under the skin 3 times a day with meals. Using a Sliding Scale) 13.5 mL 11     levothyroxine (Synthroid, Levoxyl) 125 mcg tablet Take 1 tablet (125 mcg) by mouth once daily in the morning. Take before meals.       loperamide (Imodium A-D) 2 mg tablet Take 2 tablets (4 mg) by mouth every 2 hours if needed for diarrhea.       melatonin 3 mg tablet Take 1 tablet (3 mg) by mouth as needed at bedtime for sleep for up to 15 doses. 15 tablet 0     oxyCODONE-acetaminophen (Percocet) 5-325 mg tablet Take 1 tablet by mouth every 6 hours if needed for severe pain (7 - 10). 10 tablet 0     pantoprazole (ProtoNix) 40 mg EC tablet Take 1 tablet (40 mg) by mouth once daily in the morning. Take before meals. Do not crush, chew, or split. Do not start before November 17, 2023.  30 tablet 11     sevelamer carbonate (Renvela) 800 mg tablet Take 3 tablets (2,400 mg) by mouth 3 times a day with meals.       simethicone (Mylicon) 80 mg chewable tablet Chew 1 tablet (80 mg) every 8 hours if needed for flatulence.      Patient from [facility], came with a Med list     The list below reflectives the updated allergy list. Please review each documented allergy for additional clarification and justification.  Allergies  Reviewed by Noemí King RN on 1/14/2024        Severity Reactions Comments    Cashew Nut High Anaphylaxis, Swelling cashews            Below are additional concerns with the patient's PTA list.      Opal Vera CPhT

## 2024-01-15 NOTE — CONSULTS
Infectious Diseases Initial Consultation    Referred by: Shavonne Bullock  Physician seeing patient: Anitra Denys  Primary MD: Mauricio Estrada MD  Reason For Consult: Bacteremia    History Of Present Illness  Edwar Hemphill is a 38 y.o. male with a past medical history of ESRD on HD, PAD s/p left AKA who presented to the ED 1/14 with fever. Patient temp 103.3. WBC count 10. He has a right femoral HD catheter. He is complaining of nausea, fatigue, and cough today. Blood cultures are already back positive for GPC. He was admitted to the ICU and is on levophed this AM.      Past Medical History  He has a past medical history of Essential (primary) hypertension (05/26/2017) and Personal history of other endocrine, nutritional and metabolic disease (05/26/2017).    Surgical History  He has a past surgical history that includes CT angio aorta and bilateral iliofemoral runoff w and or wo IV contrast (2/9/2023) and IR CVC exchange (11/13/2023).     Social History  He reports that he has been smoking cigarettes. He has a 3.75 pack-year smoking history. He has never used smokeless tobacco. No history on file for alcohol use and drug use.   Travel Screening       Question Response    Do you have any of the following new or worsening symptoms? Fever;Cough    In the last 10 days, have you been in contact with someone who was confirmed or suspected to have Coronavirus/COVID-19? No / Unsure    Have you had a COVID-19 viral test in the last 10 days? No    Have you traveled internationally or domestically in the last month? No          Travel History   Travel since 12/15/23    No documented travel since 12/15/23         Family History  Family History   Problem Relation Name Age of Onset    Other (DIABETES DUE TO UNDERLYING CONDITION WITH MICROALBUMINURIA) Father      Other (DIABETES DUE TO UNDERLYING CONDITION WITH MICROALBUMINURIA [Other]) Other AUNT     Other (DIABETES DUE TO UNDERLYING CONDITION WITH MICROALBUMINURIA [Other]) Other GP   "      Allergies  Cashew nut   Immunization History   Administered Date(s) Administered    Flu vaccine (IIV4), preservative free *Check age/dose* 09/17/2020    Pneumococcal polysaccharide vaccine, 23-valent, age 2 years and older (PNEUMOVAX 23) 10/02/2006    Tdap vaccine, age 7 year and older (BOOSTRIX) 10/23/2014       Review of Systems  No abd pain, SOB, chest pain, headache, sore throat, dysuria. Otherwise ROS is negative     Physical Exam  Vitals: Reviewed   General: awake, alert, no distress  Eyes: PERRL, no scleral icterus  ENT: no oral thrush, no cervical adenopathy   CV: tachycardic   Resp: lungs clear to auscultation bilaterally, normal respiratory effort   Abd: soft, nontender, nondistended  Ext: trace edema right ankle, left AKA site is intact without any tenderness or cellulitis present   Skin: no rashes, shallow right heel wound without cellulitis or purulence present      Range of Vitals (last 24 hours)  Heart Rate:  []   Temp:  [35.6 °C (96.1 °F)-39.6 °C (103.3 °F)]   Resp:  [0-23]   BP: ()/()   Height:  [175.3 cm (5' 9\")]   Weight:  [65.8 kg (145 lb)-79.4 kg (175 lb)]   SpO2:  [87 %-100 %]     Relevant Results  Lab Results   Component Value Date    WBC 8.1 01/15/2024    HGB 10.0 (L) 01/15/2024    HCT 32.5 (L) 01/15/2024    MCV 86 01/15/2024     (L) 01/15/2024      Results from last 72 hours   Lab Units 01/15/24  0632   SODIUM mmol/L 130*   POTASSIUM mmol/L 4.4   CHLORIDE mmol/L 95*   CO2 mmol/L 22   BUN mg/dL 30*   CREATININE mg/dL 8.25*   GLUCOSE mg/dL 143*   CALCIUM mg/dL 8.8   ANION GAP mmol/L 17   EGFR mL/min/1.73m*2 8*     Results from last 72 hours   Lab Units 01/15/24  0632 01/14/24  1319   ALK PHOS U/L  --  183*   BILIRUBIN TOTAL mg/dL  --  0.6   PROTEIN TOTAL g/dL  --  8.3*   ALT U/L  --  10   AST U/L  --  13   ALBUMIN g/dL 2.9* 3.6     Estimated Creatinine Clearance: 12 mL/min (A) (by C-G formula based on SCr of 8.25 mg/dL (H)).    Cultures/Micro  1/14 blood cultures " x 2 + GPC    Imagin/14 CT chest/abd/pelvis:  1.   Air in vasculature in the chest, abdomen and pelvis as described  above which may relate to vascular access, however clinical  correlation is recommended if there is concern for other etiologies.      2. Moderate size partially loculated left pleural effusion and small  right pleural effusion and bilateral atelectatic/consolidative  opacities. New 2 cm x 0.9 cm subpleural opacity right lower lobe with  mild calcifications.      3.  Right femoral central venous catheter which extends into the  chest and terminates in the SVC.      4.  Lymphadenopathy in the imaged lower neck and mediastinal  lymphadenopathy. Multiple prominent retroperitoneal lymph nodes are  also noted and enlarged inguinal lymph nodes. The lymph nodes may be  reactive in nature, however clinical correlation is recommended there  is concern for malignancy or other etiology.      5.   Gallbladder wall thickening and pericholecystic edema is  nonspecific and may relate to underlying liver disease,  hypoproteinemia or cardiac disease, however if there is concern for  acute cholecystitis/acute gallbladder pathology, right upper quadrant  ultrasound is recommended for further evaluation.      6.   Low attenuation tubular opacity in the pancreatic tail measuring  5 mm in diameter may correspond to dilated pancreatic duct and may be  better assessed on follow-up contrast enhanced imaging.      7.  Bilateral renal atrophy and multiple bilateral renal cysts and  subcentimeter hypodensities too small to characterize. Limited  evaluation for renal mass on noncontrast examination.      8.  Extensive atherosclerotic calcification in the chest, abdomen and  pelvis.      9.  Underdistention versus urinary bladder wall thickening; please  correlate urinalysis to evaluate for cystitis.    Assessment:  Sepsis due to gram positive bacteremia, present on admission. High suspicion for right femoral line infection as  source. Rule out endocarditis. Has history of left AKA stump infection, but this appears to be well-healed on examination today  ESRD on HD    Plan/Recommendations:  Continue IV vancomycin and IV zosyn for now while we wait on further identification in blood cultures. Monitor for rash, diarrhea.   Agree with plan for line removal after HD - discussed with Dr. Gonzalez  Repeat blood cultures x 2 sets tomorrow AM  Will need TTE to rule out endocarditis     Dr. Jauregui to assume care 1/16    Anitra Mcfarlane MD  [facility]  Phone: [phone]  Fax: [phone]

## 2024-01-15 NOTE — CONSULTS
Reason For Consult  ESRD on hemodialysis     History Of Present Illness  Edwar Hemphill is a 38 y.o. male with a past medical history of end-stage renal disease on hemodialysis via right femoral TDC who resides at [facility]. 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  He later had stump debridement on 7/27 with wound VAC placement.  He had polymicrobial infection including resistant E coli, Acenetobacter baumannii and Enterobacter fecalis. He was admitted in September with positive blood cultures growing Acenetobacter baumannii and Stenotrophomonas. He went to IR for dialysis line exchange.  He then was admitted again in November where CT noted left sided fluid collection concerning for abscess.  He grew MRSA and Streptococcus, catheter was exchanged on November 13, he completed vancomycin at [facility].      He comes in now with fever, chills, congestion.  Attempt was made at triple-lumen catheter placement in the neck, could not pass a wire.  Was seen by Dr. Bullock, on norepinephrine, vancomycin and Zosyn.    Past Medical History:   Diagnosis Date    Essential (primary) hypertension 05/26/2017    HTN (hypertension), benign    Personal history of other endocrine, nutritional and metabolic disease 05/26/2017    History of diabetes mellitus       Past Surgical History:   Procedure Laterality Date    CT AORTA AND BILATERAL ILIOFEMORAL RUNOFF ANGIOGRAM W AND/OR WO IV CONTRAST  2/9/2023    CT AORTA AND BILATERAL ILIOFEMORAL RUNOFF ANGIOGRAM W AND/OR WO IV CONTRAST 2/9/2023 [facility]    IR CVC EXCHANGE  11/13/2023    IR CVC EXCHANGE 11/13/2023 [facility]       Social History  "    Tobacco Use    Smoking status: Every Day     Packs/day: 0.25     Years: 15.00     Additional pack years: 0.00     Total pack years: 3.75     Types: Cigarettes    Smokeless tobacco: Never        Family History  Family History   Problem Relation Name Age of Onset    Other (DIABETES DUE TO UNDERLYING CONDITION WITH MICROALBUMINURIA) Father      Other (DIABETES DUE TO UNDERLYING CONDITION WITH MICROALBUMINURIA [Other]) Other AUNT     Other (DIABETES DUE TO UNDERLYING CONDITION WITH MICROALBUMINURIA [Other]) Other GP         Allergies  Cashew nut    Review of Systems   10 point review of systems obtained and negative unless stated in HPI:     Physical Exam   Constitutional: Well developed, awake/alert/oriented  x3   Eyes: Periorbital swelling   ENMT: mucous membranes moist   Head/Neck: Facial edema  Difficult to assess JVD   Respiratory/Thorax: Diminished bibasilar breath sounds,  no wheezing, rales or rhonchi   Cardiovascular: regular rhythm, normal  S1 and S2   Gastrointestinal: Nondistended, soft, non-tender,  no rebound tenderness or guarding   Genitourinary: No Castanon   Extremities: Less edema on right than prior   L AKA   RUE amputation  R femoral HD catheter   Neurological: No asterixis   Psychological: Appropriate mood and behavior   Skin: L AKA stump  Right femoral TDC           I&O 24HR    Intake/Output Summary (Last 24 hours) at 1/15/2024 0842  Last data filed at 1/15/2024 0657  Gross per 24 hour   Intake 2504.28 ml   Output --   Net 2504.28 ml         Vitals 24HR  Heart Rate:  []   Temp:  [35.6 °C (96.1 °F)-39.6 °C (103.3 °F)]   Resp:  [0-23]   BP: ()/()   Height:  [175.3 cm (5' 9\")]   Weight:  [65.8 kg (145 lb)-79.4 kg (175 lb)]   SpO2:  [87 %-100 %]     Scheduled Medications  heparin (porcine), 5,000 Units, subcutaneous, q8h  piperacillin-tazobactam, 2.25 g, intravenous, q8h      Continuous medications  norepinephrine, 0.01-0.5 mcg/kg/min, Last Rate: Stopped (01/15/24 0415)      PRN " medications: acetaminophen, heparin flush, vancomycin     Relevant Results  Results from last 7 days   Lab Units 01/15/24  0632 01/14/24  1319   WBC AUTO x10*3/uL 8.1 10.7   HEMOGLOBIN g/dL 10.0* 10.7*   HEMATOCRIT % 32.5* 35.2*   PLATELETS AUTO x10*3/uL 136* 190   NEUTROS PCT AUTO %  --  85.3   LYMPHS PCT AUTO %  --  7.7   MONOS PCT AUTO %  --  5.1   EOS PCT AUTO %  --  1.0        Results from last 7 days   Lab Units 01/15/24  0632 01/14/24  1319   SODIUM mmol/L 130* 131*   POTASSIUM mmol/L 4.4 4.4   CHLORIDE mmol/L 95* 93*   CO2 mmol/L 22 25   BUN mg/dL 30* 27*   CREATININE mg/dL 8.25* 7.74*   CALCIUM mg/dL 8.8 9.8   PROTEIN TOTAL g/dL  --  8.3*   BILIRUBIN TOTAL mg/dL  --  0.6   ALK PHOS U/L  --  183*   ALT U/L  --  10   AST U/L  --  13   GLUCOSE mg/dL 143* 85        US gallbladder   Final Result   Prominent gallbladder wall thickening measuring 10 mm in thickness   and pericholecystic edema. There is however no definite evidence of   gallstones and per the sonographer, sonographic Muñiz sign is   absent. The gallbladder wall thickening is therefore nonspecific and   may relate to underlying liver disease, hypoproteinemia or cardiac   disease. Clinical correlation is recommended and if there is concern   for acalculous cholecystitis, HIDA scan may be obtained for further   evaluation.        MACRO:   None        Signed by: Houston Conde 1/14/2024 7:32 PM   Dictation workstation:   [id number]      CT chest abdomen pelvis wo IV contrast   Final Result   1.   Air in vasculature in the chest, abdomen and pelvis as described   above which may relate to vascular access, however clinical   correlation is recommended if there is concern for other etiologies.        2. Moderate size partially loculated left pleural effusion and small   right pleural effusion and bilateral atelectatic/consolidative   opacities. New 2 cm x 0.9 cm subpleural opacity right lower lobe with   mild calcifications.        3.  Right femoral  central venous catheter which extends into the   chest and terminates in the SVC.        4.  Lymphadenopathy in the imaged lower neck and mediastinal   lymphadenopathy. Multiple prominent retroperitoneal lymph nodes are   also noted and enlarged inguinal lymph nodes. The lymph nodes may be   reactive in nature, however clinical correlation is recommended there   is concern for malignancy or other etiology.        5.   Gallbladder wall thickening and pericholecystic edema is   nonspecific and may relate to underlying liver disease,   hypoproteinemia or cardiac disease, however if there is concern for   acute cholecystitis/acute gallbladder pathology, right upper quadrant   ultrasound is recommended for further evaluation.        6.   Low attenuation tubular opacity in the pancreatic tail measuring   5 mm in diameter may correspond to dilated pancreatic duct and may be   better assessed on follow-up contrast enhanced imaging.        7.  Bilateral renal atrophy and multiple bilateral renal cysts and   subcentimeter hypodensities too small to characterize. Limited   evaluation for renal mass on noncontrast examination.        8.  Extensive atherosclerotic calcification in the chest, abdomen and   pelvis.        9.  Underdistention versus urinary bladder wall thickening; please   correlate urinalysis to evaluate for cystitis.        MACRO:   None        Signed by: Houston Conde 1/14/2024 6:05 PM   Dictation workstation:   [id number]      XR chest 2 views   Final Result   1. Small left pleural effusion with associated atelectasis and/or   pneumonitis.   2. Right basilar atelectasis and/or scarring.        MACRO:   None        Signed by: Jarvis Holcomb 1/14/2024 2:51 PM   Dictation workstation:   [id number]            Assessment/Plan     Edwar Hemphill is a 38 y.o. male with a past medical history of end-stage renal disease on hemodialysis via right femoral TDC who resides at [facility]. He has a history of diabetes,  hypertension, right arm amputation, left 4th toe osteomyelitis status post fourth digit amputation and left ankle I&D, recent MRSA CLABSI treated with PermCath exchange, left lower limb wet gangrene status post left below the knee guillotine amputation  on 7/5/2023 followed by above the knee amputation due to septic knee arthritis on 7/8.  He later had stump debridement on 7/27 with wound VAC placement.  He had polymicrobial infection including resistant E coli, Acenetobacter baumannii and Enterobacter fecalis. He was admitted in September with positive blood cultures growing Acenetobacter baumannii and Stenotrophomonas. He went to  for dialysis line exchange.  He then was admitted again in November where CT noted left sided fluid collection concerning for abscess.  He grew MRSA and Streptococcus, catheter was exchanged on November 13, he completed vancomycin at [facility].      He comes in now with fever, chills, congestion.  Attempt was made at triple-lumen catheter placement in the neck, could not pass a wire.  Was seen by Dr. Bullock, on norepinephrine, vancomycin and Zosyn.    He is less fluid overloaded than is typical for him.  He had a full dialysis last Friday on the 12th, I will dialyze him today on a 3 potassium bath, minimal fluid removal.  He is growing gram-positive cocci in clusters, we will then have the dialysis catheter removed if okay with Dr. Mcfarlane who is on the case.  He will get a small dose of erythropoietin.    Principal Problem:    Septic shock (CMS/[facility])      I spent 55 minutes in the professional and overall care of this patient.      Francisco Lopez

## 2024-01-16 ENCOUNTER — APPOINTMENT (OUTPATIENT)
Dept: CARDIOLOGY | Facility: HOSPITAL | Age: 39
DRG: 314 | End: 2024-01-16
Payer: COMMERCIAL

## 2024-01-16 LAB
ALBUMIN SERPL BCP-MCNC: 2.8 G/DL (ref 3.4–5)
ANION GAP SERPL CALC-SCNC: 27 MMOL/L (ref 10–20)
ATRIAL RATE: 98 BPM
BUN SERPL-MCNC: 19 MG/DL (ref 6–23)
CALCIUM SERPL-MCNC: 8.8 MG/DL (ref 8.6–10.3)
CHLORIDE SERPL-SCNC: 95 MMOL/L (ref 98–107)
CO2 SERPL-SCNC: 17 MMOL/L (ref 21–32)
CREAT SERPL-MCNC: 5.24 MG/DL (ref 0.5–1.3)
EGFRCR SERPLBLD CKD-EPI 2021: 13 ML/MIN/1.73M*2
ERYTHROCYTE [DISTWIDTH] IN BLOOD BY AUTOMATED COUNT: 21.4 % (ref 11.5–14.5)
GLUCOSE BLD MANUAL STRIP-MCNC: 191 MG/DL (ref 74–99)
GLUCOSE BLD MANUAL STRIP-MCNC: 215 MG/DL (ref 74–99)
GLUCOSE BLD MANUAL STRIP-MCNC: 223 MG/DL (ref 74–99)
GLUCOSE BLD MANUAL STRIP-MCNC: 228 MG/DL (ref 74–99)
GLUCOSE SERPL-MCNC: 170 MG/DL (ref 74–99)
HCT VFR BLD AUTO: 29.6 % (ref 41–52)
HGB BLD-MCNC: 9.1 G/DL (ref 13.5–17.5)
MAGNESIUM SERPL-MCNC: 1.7 MG/DL (ref 1.6–2.4)
MCH RBC QN AUTO: 27.1 PG (ref 26–34)
MCHC RBC AUTO-ENTMCNC: 30.7 G/DL (ref 32–36)
MCV RBC AUTO: 88 FL (ref 80–100)
NRBC BLD-RTO: 0 /100 WBCS (ref 0–0)
P AXIS: 60 DEGREES
P OFFSET: 202 MS
P ONSET: 147 MS
PHOSPHATE SERPL-MCNC: 5.1 MG/DL (ref 2.5–4.9)
PLATELET # BLD AUTO: 124 X10*3/UL (ref 150–450)
POTASSIUM SERPL-SCNC: 4 MMOL/L (ref 3.5–5.3)
PR INTERVAL: 150 MS
Q ONSET: 222 MS
QRS COUNT: 17 BEATS
QRS DURATION: 82 MS
QT INTERVAL: 368 MS
QTC CALCULATION(BAZETT): 469 MS
QTC FREDERICIA: 433 MS
R AXIS: 43 DEGREES
RBC # BLD AUTO: 3.36 X10*6/UL (ref 4.5–5.9)
SODIUM SERPL-SCNC: 135 MMOL/L (ref 136–145)
T AXIS: 79 DEGREES
T OFFSET: 406 MS
VENTRICULAR RATE: 98 BPM
WBC # BLD AUTO: 7.4 X10*3/UL (ref 4.4–11.3)

## 2024-01-16 PROCEDURE — 05PYX3Z REMOVAL OF INFUSION DEVICE FROM UPPER VEIN, EXTERNAL APPROACH: ICD-10-PCS | Performed by: NURSE PRACTITIONER

## 2024-01-16 PROCEDURE — 2500000004 HC RX 250 GENERAL PHARMACY W/ HCPCS (ALT 636 FOR OP/ED): Performed by: NURSE PRACTITIONER

## 2024-01-16 PROCEDURE — 83735 ASSAY OF MAGNESIUM: CPT | Performed by: NURSE PRACTITIONER

## 2024-01-16 PROCEDURE — 99233 SBSQ HOSP IP/OBS HIGH 50: CPT | Performed by: NURSE PRACTITIONER

## 2024-01-16 PROCEDURE — 36589 REMOVAL TUNNELED CV CATH: CPT | Performed by: NURSE PRACTITIONER

## 2024-01-16 PROCEDURE — 80069 RENAL FUNCTION PANEL: CPT | Performed by: NURSE PRACTITIONER

## 2024-01-16 PROCEDURE — 82947 ASSAY GLUCOSE BLOOD QUANT: CPT

## 2024-01-16 PROCEDURE — 2500000004 HC RX 250 GENERAL PHARMACY W/ HCPCS (ALT 636 FOR OP/ED): Performed by: PHARMACIST

## 2024-01-16 PROCEDURE — 2500000002 HC RX 250 W HCPCS SELF ADMINISTERED DRUGS (ALT 637 FOR MEDICARE OP, ALT 636 FOR OP/ED): Performed by: NURSE PRACTITIONER

## 2024-01-16 PROCEDURE — 2500000004 HC RX 250 GENERAL PHARMACY W/ HCPCS (ALT 636 FOR OP/ED): Performed by: ANESTHESIOLOGY

## 2024-01-16 PROCEDURE — 93306 TTE W/DOPPLER COMPLETE: CPT

## 2024-01-16 PROCEDURE — 93306 TTE W/DOPPLER COMPLETE: CPT | Performed by: INTERNAL MEDICINE

## 2024-01-16 PROCEDURE — 2060000001 HC INTERMEDIATE ICU ROOM DAILY

## 2024-01-16 PROCEDURE — 93010 ELECTROCARDIOGRAM REPORT: CPT | Performed by: INTERNAL MEDICINE

## 2024-01-16 PROCEDURE — 36415 COLL VENOUS BLD VENIPUNCTURE: CPT | Performed by: NURSE PRACTITIONER

## 2024-01-16 PROCEDURE — 2500000005 HC RX 250 GENERAL PHARMACY W/O HCPCS

## 2024-01-16 PROCEDURE — 93005 ELECTROCARDIOGRAM TRACING: CPT

## 2024-01-16 PROCEDURE — 85027 COMPLETE CBC AUTOMATED: CPT | Performed by: INTERNAL MEDICINE

## 2024-01-16 RX ORDER — PROCHLORPERAZINE EDISYLATE 5 MG/ML
2.5 INJECTION INTRAMUSCULAR; INTRAVENOUS ONCE
Status: COMPLETED | OUTPATIENT
Start: 2024-01-16 | End: 2024-01-16

## 2024-01-16 RX ORDER — MAGNESIUM SULFATE HEPTAHYDRATE 40 MG/ML
2 INJECTION, SOLUTION INTRAVENOUS ONCE
Status: COMPLETED | OUTPATIENT
Start: 2024-01-16 | End: 2024-01-16

## 2024-01-16 RX ORDER — LIDOCAINE HYDROCHLORIDE 20 MG/ML
10 INJECTION, SOLUTION EPIDURAL; INFILTRATION; INTRACAUDAL; PERINEURAL ONCE
Status: DISCONTINUED | OUTPATIENT
Start: 2024-01-16 | End: 2024-01-23 | Stop reason: HOSPADM

## 2024-01-16 RX ORDER — SODIUM CHLORIDE 0.9 % (FLUSH) 0.9 %
10 SYRINGE (ML) INJECTION EVERY 12 HOURS
Status: CANCELLED | OUTPATIENT
Start: 2024-01-16

## 2024-01-16 RX ORDER — ONDANSETRON 4 MG/1
4 TABLET, ORALLY DISINTEGRATING ORAL EVERY 8 HOURS PRN
Status: DISCONTINUED | OUTPATIENT
Start: 2024-01-16 | End: 2024-01-23 | Stop reason: HOSPADM

## 2024-01-16 RX ORDER — LIDOCAINE HYDROCHLORIDE 20 MG/ML
INJECTION, SOLUTION INFILTRATION; PERINEURAL
Status: COMPLETED
Start: 2024-01-16 | End: 2024-01-16

## 2024-01-16 RX ORDER — DEXTROSE 50 % IN WATER (D50W) INTRAVENOUS SYRINGE
25
Status: DISCONTINUED | OUTPATIENT
Start: 2024-01-16 | End: 2024-01-16 | Stop reason: SDUPTHER

## 2024-01-16 RX ORDER — ONDANSETRON HYDROCHLORIDE 2 MG/ML
4 INJECTION, SOLUTION INTRAVENOUS EVERY 8 HOURS PRN
Status: DISCONTINUED | OUTPATIENT
Start: 2024-01-16 | End: 2024-01-23 | Stop reason: HOSPADM

## 2024-01-16 RX ORDER — DEXTROSE MONOHYDRATE 100 MG/ML
0.3 INJECTION, SOLUTION INTRAVENOUS ONCE AS NEEDED
Status: DISCONTINUED | OUTPATIENT
Start: 2024-01-16 | End: 2024-01-16 | Stop reason: SDUPTHER

## 2024-01-16 RX ORDER — LIDOCAINE HYDROCHLORIDE 10 MG/ML
5 INJECTION INFILTRATION; PERINEURAL ONCE
Status: DISCONTINUED | OUTPATIENT
Start: 2024-01-16 | End: 2024-01-23 | Stop reason: HOSPADM

## 2024-01-16 RX ORDER — SODIUM CHLORIDE 0.9 % (FLUSH) 0.9 %
10 SYRINGE (ML) INJECTION AS NEEDED
Status: CANCELLED | OUTPATIENT
Start: 2024-01-16

## 2024-01-16 RX ADMIN — HEPARIN SODIUM 5000 UNITS: 5000 INJECTION INTRAVENOUS; SUBCUTANEOUS at 13:39

## 2024-01-16 RX ADMIN — HEPARIN SODIUM 5000 UNITS: 5000 INJECTION INTRAVENOUS; SUBCUTANEOUS at 05:38

## 2024-01-16 RX ADMIN — INSULIN LISPRO 4 UNITS: 100 INJECTION, SOLUTION INTRAVENOUS; SUBCUTANEOUS at 12:53

## 2024-01-16 RX ADMIN — LIDOCAINE HYDROCHLORIDE 400 MG: 20 INJECTION, SOLUTION INFILTRATION; PERINEURAL at 15:27

## 2024-01-16 RX ADMIN — ONDANSETRON 4 MG: 2 INJECTION INTRAMUSCULAR; INTRAVENOUS at 14:00

## 2024-01-16 RX ADMIN — HEPARIN SODIUM 5000 UNITS: 5000 INJECTION INTRAVENOUS; SUBCUTANEOUS at 21:37

## 2024-01-16 RX ADMIN — ONDANSETRON 4 MG: 2 INJECTION INTRAMUSCULAR; INTRAVENOUS at 07:52

## 2024-01-16 RX ADMIN — PROCHLORPERAZINE EDISYLATE 2.5 MG: 5 INJECTION INTRAMUSCULAR; INTRAVENOUS at 03:53

## 2024-01-16 RX ADMIN — PIPERACILLIN SODIUM AND TAZOBACTAM SODIUM 2.25 G: 2; .25 INJECTION, SOLUTION INTRAVENOUS at 07:52

## 2024-01-16 RX ADMIN — HEPARIN, PORCINE (PF) 10 UNIT/ML INTRAVENOUS SYRINGE 10 UNITS: at 03:53

## 2024-01-16 RX ADMIN — INSULIN LISPRO 2 UNITS: 100 INJECTION, SOLUTION INTRAVENOUS; SUBCUTANEOUS at 09:22

## 2024-01-16 RX ADMIN — MAGNESIUM SULFATE IN WATER 2 G: 2 INJECTION, SOLUTION INTRAVENOUS at 07:52

## 2024-01-16 RX ADMIN — INSULIN LISPRO 4 UNITS: 100 INJECTION, SOLUTION INTRAVENOUS; SUBCUTANEOUS at 17:57

## 2024-01-16 ASSESSMENT — PAIN SCALES - GENERAL
PAINLEVEL_OUTOF10: 0 - NO PAIN

## 2024-01-16 ASSESSMENT — PAIN - FUNCTIONAL ASSESSMENT
PAIN_FUNCTIONAL_ASSESSMENT: 0-10

## 2024-01-16 NOTE — PROGRESS NOTES
"  INFECTIOUS DISEASE DAILY PROGRESS NOTE    SUBJECTIVE:    No new events. Afebrile. Line will be removed today. There has been issues with IV access peripherally.    OBJECTIVE:  VITALS (Last 24 Hours)  /69   Pulse 95   Temp 35.9 °C (96.6 °F) (Temporal)   Resp 21   Ht 1.753 m (5' 9\")   Wt 72.5 kg (159 lb 13.3 oz)   SpO2 (!) 88%   BMI 23.60 kg/m²     PHYSICAL EXAM:  Gen - NAD, in bed  Heart - RRR  Lungs - clear, no wheezing  LLE - s/p AKA  Skin - no rash  Misc - fem HD cath present    ABX: IV Vanc/Zosyn    LABS:  Lab Results   Component Value Date    WBC 7.4 01/16/2024    HGB 9.1 (L) 01/16/2024    HCT 29.6 (L) 01/16/2024    MCV 88 01/16/2024     (L) 01/16/2024     Lab Results   Component Value Date    GLUCOSE 170 (H) 01/16/2024    CALCIUM 8.8 01/16/2024     (L) 01/16/2024    K 4.0 01/16/2024    CO2 17 (L) 01/16/2024    CL 95 (L) 01/16/2024    BUN 19 01/16/2024    CREATININE 5.24 (H) 01/16/2024     Results from last 72 hours   Lab Units 01/16/24  0355 01/15/24  0632 01/14/24  1319   ALK PHOS U/L  --   --  183*   BILIRUBIN TOTAL mg/dL  --   --  0.6   PROTEIN TOTAL g/dL  --   --  8.3*   ALT U/L  --   --  10   AST U/L  --   --  13   ALBUMIN g/dL 2.8*   < > 3.6    < > = values in this interval not displayed.     Estimated Creatinine Clearance: 18.9 mL/min (A) (by C-G formula based on SCr of 5.24 mg/dL (H)).        ASSESSMENT/PLAN:    CLABSI (HD cath POA) due to Staph aureus - 3rd line infection in the last 3-4 months. Will need to have full line holiday instead of line exchange. US guided IV is fine and midline is fine for peripheral access also if needed.  ESRD on HD    IV Vancomycin. Stopped Zosyn. Will repeat blood cx x2 tomorrow. TTE pending for endocarditis evaluation.    Will follow. Thanks! D/w ICU team    Mat Jauregui MD  [facility]  Office #[phone]      "

## 2024-01-16 NOTE — POST-PROCEDURE NOTE
Interventional Radiology Brief Postprocedure Note    Tunneled HD line removal- Right fem.     Attending: OSBALDO Gutierrez    Assistant: none    Diagnosis: Bacteremia, MRSA    Description of procedure: The patient was placed in the supine position. The patient's skin was prepped and draped in a typical sterile manner. Lidocaine 1% was given at the site of catheter insertion. Using blunt dissection, the catheter cuff was externalized. The catheter was removed in its entirety while pressure was held at the insertion site and the internal jugular vein. Hemostasis was achieved and no hematoma was appreciated at the neck. Gauze and tegaderm were applied. Sterile technique was used throughout the procedure.     Anesthesia:  Local    Complications: None    Estimated Blood Loss: minimal    Specimens: No    See detailed result report with images in PACS.    The patient tolerated the procedure well without incident or complication and is in stable condition.

## 2024-01-16 NOTE — PROGRESS NOTES
Edwar Hemphill is a 38 y.o. male on day 2 of admission presenting with Septic shock (CMS/HCC).          Patient has 3rd dialysis line infection in the last 3-4 months. Will need to have full line holiday instead of line exchange. ID is following and per notes patient will need POLI to r/o endocarditis and also repeat blood cultures tomorrow. Patient is not medically ready yet.

## 2024-01-16 NOTE — CONSULTS
Reason For Consult  ESRD on hemodialysis     History Of Present Illness  Edwar Hemphill is a 38 y.o. male with a past medical history of end-stage renal disease on hemodialysis via right femoral TDC who resides at [facility]. 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  He later had stump debridement on 7/27 with wound VAC placement.  He had polymicrobial infection including resistant E coli, Acinetobacter baumannii and Enterobacter fecalis. He was admitted in September with positive blood cultures growing Acenetobacter baumannii and Stenotrophomonas. He went to IR for dialysis line exchange.  He then was admitted again in November where CT noted left sided fluid collection concerning for abscess.  He grew MRSA and Streptococcus, catheter was exchanged on November 13, he completed vancomycin at [facility].      He comes in now with fever, chills, congestion.  Attempt was made at triple-lumen catheter placement in the neck, could not pass a wire.  Was seen by Dr. Bullock, on norepinephrine, vancomycin and Zosyn.  We dialyzed him yesterday, off of pressors.    Past Medical History:   Diagnosis Date    Essential (primary) hypertension 05/26/2017    HTN (hypertension), benign    Personal history of other endocrine, nutritional and metabolic disease 05/26/2017    History of diabetes mellitus       Past Surgical History:   Procedure Laterality Date    CT AORTA AND BILATERAL ILIOFEMORAL RUNOFF ANGIOGRAM W AND/OR WO IV CONTRAST  2/9/2023    CT AORTA AND BILATERAL ILIOFEMORAL RUNOFF ANGIOGRAM W AND/OR WO IV CONTRAST 2/9/2023 [facility]    IR CVC EXCHANGE  11/13/2023    IR CVC EXCHANGE  11/13/2023 [facility]       Social History     Tobacco Use    Smoking status: Every Day     Packs/day: 0.25     Years: 15.00     Additional pack years: 0.00     Total pack years: 3.75     Types: Cigarettes    Smokeless tobacco: Never        Family History  Family History   Problem Relation Name Age of Onset    Other (DIABETES DUE TO UNDERLYING CONDITION WITH MICROALBUMINURIA) Father      Other (DIABETES DUE TO UNDERLYING CONDITION WITH MICROALBUMINURIA [Other]) Other AUNT     Other (DIABETES DUE TO UNDERLYING CONDITION WITH MICROALBUMINURIA [Other]) Other GP         Allergies  Cashew nut    Review of Systems   10 point review of systems obtained and negative unless stated in HPI:     Physical Exam   Constitutional: Well developed, awake/alert/oriented  x3   Eyes: Periorbital swelling   ENMT: mucous membranes moist   Head/Neck: Facial edema  Difficult to assess JVD   Respiratory/Thorax: Diminished bibasilar breath sounds,  no wheezing, rales or rhonchi   Cardiovascular: regular rhythm, normal  S1 and S2   Gastrointestinal: Nondistended, soft, non-tender,  no rebound tenderness or guarding   Genitourinary: No Castanon   Extremities: Less edema on right than prior   L AKA   RUE amputation  R femoral HD catheter   Neurological: No asterixis   Psychological: Appropriate mood and behavior   Skin: L AKA stump  Right femoral TDC           I&O 24HR    Intake/Output Summary (Last 24 hours) at 1/16/2024 1110  Last data filed at 1/16/2024 0600  Gross per 24 hour   Intake 1201.49 ml   Output 1500 ml   Net -298.51 ml         Vitals 24HR  Heart Rate:  []   Temp:  [35.8 °C (96.4 °F)-37.8 °C (100 °F)]   Resp:  [0-33]   BP: ()/()   Weight:  [72.5 kg (159 lb 13.3 oz)]   SpO2:  [63 %-100 %]     Scheduled Medications  [START ON 1/17/2024] epoetin tyson or biosimilar, 6,000 Units, subcutaneous, Once per day on Mon Wed Fri  heparin (porcine), 5,000 Units, subcutaneous, q8h  insulin lispro, 0-10 Units, subcutaneous, TID with  meals  levothyroxine, 125 mcg, oral, Daily  lidocaine, 5 mL, infiltration, Once  piperacillin-tazobactam, 2.25 g, intravenous, q8h  sevelamer carbonate, 2,400 mg, oral, TID with meals      Continuous medications  norepinephrine, 0.01-0.5 mcg/kg/min, Last Rate: Stopped (01/16/24 0100)      PRN medications: acetaminophen, bisacodyl, dextrose 10 % in water (D10W), dextrose, glucagon, heparin flush, ondansetron ODT **OR** ondansetron, vancomycin     Relevant Results  Results from last 7 days   Lab Units 01/16/24  0355 01/15/24  0632 01/14/24  1319   WBC AUTO x10*3/uL 7.4 8.1 10.7   HEMOGLOBIN g/dL 9.1* 10.0* 10.7*   HEMATOCRIT % 29.6* 32.5* 35.2*   PLATELETS AUTO x10*3/uL 124* 136* 190   NEUTROS PCT AUTO %  --   --  85.3   LYMPHS PCT AUTO %  --   --  7.7   MONOS PCT AUTO %  --   --  5.1   EOS PCT AUTO %  --   --  1.0        Results from last 7 days   Lab Units 01/16/24  0355 01/15/24  0632 01/14/24  1319   SODIUM mmol/L 135* 130* 131*   POTASSIUM mmol/L 4.0 4.4 4.4   CHLORIDE mmol/L 95* 95* 93*   CO2 mmol/L 17* 22 25   BUN mg/dL 19 30* 27*   CREATININE mg/dL 5.24* 8.25* 7.74*   CALCIUM mg/dL 8.8 8.8 9.8   PROTEIN TOTAL g/dL  --   --  8.3*   BILIRUBIN TOTAL mg/dL  --   --  0.6   ALK PHOS U/L  --   --  183*   ALT U/L  --   --  10   AST U/L  --   --  13   GLUCOSE mg/dL 170* 143* 85        US gallbladder   Final Result   Prominent gallbladder wall thickening measuring 10 mm in thickness   and pericholecystic edema. There is however no definite evidence of   gallstones and per the sonographer, sonographic Muñiz sign is   absent. The gallbladder wall thickening is therefore nonspecific and   may relate to underlying liver disease, hypoproteinemia or cardiac   disease. Clinical correlation is recommended and if there is concern   for acalculous cholecystitis, HIDA scan may be obtained for further   evaluation.        MACRO:   None        Signed by: Houston Conde 1/14/2024 7:32 PM   Dictation workstation:   [id number]      CT  chest abdomen pelvis wo IV contrast   Final Result   1.   Air in vasculature in the chest, abdomen and pelvis as described   above which may relate to vascular access, however clinical   correlation is recommended if there is concern for other etiologies.        2. Moderate size partially loculated left pleural effusion and small   right pleural effusion and bilateral atelectatic/consolidative   opacities. New 2 cm x 0.9 cm subpleural opacity right lower lobe with   mild calcifications.        3.  Right femoral central venous catheter which extends into the   chest and terminates in the SVC.        4.  Lymphadenopathy in the imaged lower neck and mediastinal   lymphadenopathy. Multiple prominent retroperitoneal lymph nodes are   also noted and enlarged inguinal lymph nodes. The lymph nodes may be   reactive in nature, however clinical correlation is recommended there   is concern for malignancy or other etiology.        5.   Gallbladder wall thickening and pericholecystic edema is   nonspecific and may relate to underlying liver disease,   hypoproteinemia or cardiac disease, however if there is concern for   acute cholecystitis/acute gallbladder pathology, right upper quadrant   ultrasound is recommended for further evaluation.        6.   Low attenuation tubular opacity in the pancreatic tail measuring   5 mm in diameter may correspond to dilated pancreatic duct and may be   better assessed on follow-up contrast enhanced imaging.        7.  Bilateral renal atrophy and multiple bilateral renal cysts and   subcentimeter hypodensities too small to characterize. Limited   evaluation for renal mass on noncontrast examination.        8.  Extensive atherosclerotic calcification in the chest, abdomen and   pelvis.        9.  Underdistention versus urinary bladder wall thickening; please   correlate urinalysis to evaluate for cystitis.        MACRO:   None        Signed by: Houston Conde 1/14/2024 6:05 PM   Dictation  workstation:   [id number]      XR chest 2 views   Final Result   1. Small left pleural effusion with associated atelectasis and/or   pneumonitis.   2. Right basilar atelectasis and/or scarring.        MACRO:   None        Signed by: Jarvis Holcomb 1/14/2024 2:51 PM   Dictation workstation:   [id number]      Transthoracic Echo (TTE) Complete    (Results Pending)   IR CVC PICC    (Results Pending)   IR CVC removal    (Results Pending)   Bedside Midline Imaging    (Results Pending)         Assessment/Plan     Edwar Hemphill is a 38 y.o. male with a past medical history of end-stage renal disease on hemodialysis via right femoral TDC who resides at [facility]. 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  He later had stump debridement on 7/27 with wound VAC placement.  He had polymicrobial infection including resistant E coli, Acenetobacter baumannii and Enterobacter fecalis. He was admitted in September with positive blood cultures growing Acenetobacter baumannii and Stenotrophomonas. He went to IR for dialysis line exchange.  He then was admitted again in November where CT noted left sided fluid collection concerning for abscess.  He grew MRSA and Streptococcus, catheter was exchanged on November 13, he completed vancomycin at [facility].      He comes in now with fever, chills, congestion.  Attempt was made at triple-lumen catheter placement in the neck, could not pass a wire.  Was seen by Dr. Bullock, on norepinephrine, vancomycin and Zosyn.    He is less fluid overloaded than is typical for him.  He had a full dialysis last Friday on the 12th, I dialyzed him again yesterday.  But we have  no choice but to give him a line holiday, to pull his femoral dialysis catheter.  He will never agree to an access in the left upper extremity as an infected graft may have led to his above the elbow amputation on the right.  Peritoneal dialysis may need to be considered which I discussed with he and his family member who was in the room.  He has MRSA in the blood, he will have a line holiday, may need a midline in the left upper extremity and less we can get an external jugular IV now.  It is a very complicated situation.  I am hoping that we can get a neck dialysis catheter to avoid the femoral position.  I spoke with Aashish Shearer regarding the case.    Principal Problem:    Septic shock (CMS/HCC)      I spent 50 minutes in the professional and overall care of this patient.      Zeeshan Gonzalez MD

## 2024-01-16 NOTE — NURSING NOTE
Patient with poor access, team requesting ultrasound IV.  Site prepped with chlorhexidine.  20g PIV inserted in left upper arm on first attempt using ultrasound guidance.  Line has brisk blood return and was flushed with 20cc Normal Saline.    Bedside RN and care team updated.

## 2024-01-16 NOTE — PROGRESS NOTES
"Edwar Hemphill is a 38 y.o. male on day 2 of admission presenting with Septic shock (CMS/HCC).    Subjective   CANDELARIO reported         Objective     Physical Exam    Constitutional:       Appearance: Normal appearance.   Cardiovascular:      Rate and Rhythm: Regular rhythm. Tachycardia present.      Pulses: Normal pulses.   Pulmonary:      Effort: Pulmonary effort is normal.   Abdominal:      General: There is no distension.      Palpations: Abdomen is soft.      Tenderness: There is no abdominal tenderness.   Musculoskeletal:      Comments: RUE amputation  LLE AKA, stump incision appears healed and no drainage or erythema noted.    Skin:     General: Skin is warm and dry.      Capillary Refill: Capillary refill takes less than 2 seconds.   Neurological:      General: No focal deficit present.      Mental Status: He is alert and oriented to person, place, and time. Mental status is at baseline.   Psychiatric:         Mood and Affect: Mood normal.     Last Recorded Vitals  Blood pressure 113/71, pulse 93, temperature 35.8 °C (96.4 °F), temperature source Temporal, resp. rate 25, height 1.753 m (5' 9\"), weight 72.5 kg (159 lb 13.3 oz), SpO2 100 %.  Intake/Output last 3 Shifts:  I/O last 3 completed shifts:  In: 3704.3 (49.9 mL/kg) [I.V.:854.3 (11.5 mL/kg); Other:400; IV Piggyback:2450]  Out: 900 (12.1 mL/kg) [Other:900]  Weight: 74.2 kg     Relevant Results  Scheduled medications  [START ON 1/17/2024] epoetin tyson or biosimilar, 6,000 Units, subcutaneous, Once per day on Mon Wed Fri  heparin (porcine), 5,000 Units, subcutaneous, q8h  insulin lispro, 0-10 Units, subcutaneous, TID with meals  levothyroxine, 125 mcg, oral, Daily  piperacillin-tazobactam, 2.25 g, intravenous, q8h  sevelamer carbonate, 2,400 mg, oral, TID with meals      Continuous medications  norepinephrine, 0.01-0.5 mcg/kg/min, Last Rate: Stopped (01/16/24 0100)      PRN medications  PRN medications: acetaminophen, bisacodyl, dextrose 10 % in water (D10W), " Initial SW/CM Assessment/Plan of Care Note     Baseline Assessment  80 year old admitted 11/19/2019 as Inpatient with a diagnosis of adrenal mass and hemorrhage, metastatic lung cancer.   Prior to admission patient was living with Spouse/significant other and residing at House.  Patient does  have a Power of  for Healthcare.  Document is activated.  Agent is Eden Huitron.  Patient’s Primary Care Provider is Miguel Angel Myles MD.     Medical History  Past Medical History:   Diagnosis Date   • Shah's esophagus without dysplasia    • Blood clot associated with vein wall inflammation    • Malignant neoplasm (CMS/HCC)        Prior to Admission Status  Functional Status  Ambulation: Independent/Self  Bathing: Independent/Self  Dressing: Independent/Self  Toileting: Independent/Self  Meal Preparation: Significant Other  Shopping: Independent/Self, Significant Other  Medication Preparation: Independent/Self  Medication Administration: Independent/Self  Housekeeping: Significant Other  Laundry: Significant Other  Transportation: Significant Other    Agency/Support  Type of Services Prior to Hospitalization: None  Support Systems: Family members  Home Devices/Equipment: Mobility assist device  Mobility Assist Devices: Cane  Sensory Support Devices: Eyeglasses    Current Status  PT Ambulation Tips:    PT Transfer Tips:    OT Bathing Tips:    OT Dressing Tips:    OT Toileting Tips:    OT Feeding Tips:    SLP Swallow/Feeding Tips:    SLP Comm/Cog Tips:    Current Mental Status: Cooperative, Pleasant, Talkative  Stressors:      Insurance  Primary: MEDICARE  Secondary: ANTHEM/BCBS    Barriers to Discharge  Identified Barriers to Discharge/Transition Planning: Assessment/stabilization in progress    Progress Note  Met with patient for discharge planning rounds.  Patient is fairly independent from home with spouse. Does not drive.  No anticipated d/c needs  Plan  SW/CM - Recommendations for Discharge:    PT -  Recommendations for Discharge:    OT - Recommendations for Discharge:    SLP - Recommendations for Discharge:    Anticipate patient will not need post-hospital services. Necessary services are available.  Anticipate patient can return to the environment from which patient entered the hospital.   Anticipate patient can provide self-care at discharge.    Refer to / Flowsheet for Goals and objective data.      dextrose, glucagon, heparin flush, ondansetron, vancomycin         Results for orders placed or performed during the hospital encounter of 01/14/24 (from the past 24 hour(s))   MRSA Surveillance for Vancomycin De-escalation, PCR    Specimen: Anterior Nares; Swab   Result Value Ref Range    MRSA PCR Detected (A) Not Detected   Hepatitis B surface antibody   Result Value Ref Range    Hepatitis B Surface AB <3.1 <10.0 mIU/mL   POCT GLUCOSE   Result Value Ref Range    POCT Glucose 130 (H) 74 - 99 mg/dL   CBC   Result Value Ref Range    WBC 7.4 4.4 - 11.3 x10*3/uL    nRBC 0.0 0.0 - 0.0 /100 WBCs    RBC 3.36 (L) 4.50 - 5.90 x10*6/uL    Hemoglobin 9.1 (L) 13.5 - 17.5 g/dL    Hematocrit 29.6 (L) 41.0 - 52.0 %    MCV 88 80 - 100 fL    MCH 27.1 26.0 - 34.0 pg    MCHC 30.7 (L) 32.0 - 36.0 g/dL    RDW 21.4 (H) 11.5 - 14.5 %    Platelets 124 (L) 150 - 450 x10*3/uL   Renal Function Panel   Result Value Ref Range    Glucose 170 (H) 74 - 99 mg/dL    Sodium 135 (L) 136 - 145 mmol/L    Potassium 4.0 3.5 - 5.3 mmol/L    Chloride 95 (L) 98 - 107 mmol/L    Bicarbonate 17 (L) 21 - 32 mmol/L    Anion Gap 27 (H) 10 - 20 mmol/L    Urea Nitrogen 19 6 - 23 mg/dL    Creatinine 5.24 (H) 0.50 - 1.30 mg/dL    eGFR 13 (L) >60 mL/min/1.73m*2    Calcium 8.8 8.6 - 10.3 mg/dL    Phosphorus 5.1 (H) 2.5 - 4.9 mg/dL    Albumin 2.8 (L) 3.4 - 5.0 g/dL   Magnesium   Result Value Ref Range    Magnesium 1.70 1.60 - 2.40 mg/dL   POCT GLUCOSE   Result Value Ref Range    POCT Glucose 191 (H) 74 - 99 mg/dL   Electrocardiogram, 12-lead PRN ACS symptoms   Result Value Ref Range    Ventricular Rate 98 BPM    Atrial Rate 98 BPM    OR Interval 150 ms    QRS Duration 82 ms    QT Interval 368 ms    QTC Calculation(Bazett) 469 ms    P Axis 60 degrees    R Axis 43 degrees    T Axis 79 degrees    QRS Count 17 beats    Q Onset 222 ms    P Onset 147 ms    P Offset 202 ms    T Offset 406 ms    QTC Fredericia 433 ms     Electrocardiogram, 12-lead PRN ACS symptoms    Result  Date: 1/16/2024  Normal sinus rhythm Low voltage QRS Prolonged QT Abnormal ECG When compared with ECG of 14-JAN-2024 23:42, (unconfirmed) Fusion complexes are no longer Present QRS duration has decreased Non-specific change in ST segment in Anterior leads Nonspecific T wave abnormality has replaced inverted T waves in Lateral leads    Electrocardiogram, 12-lead PRN ACS symptoms    Result Date: 1/15/2024  Sinus tachycardia with Fusion complexes Left axis deviation Septal infarct (cited on or before 14-JAN-2024) T wave abnormality, consider lateral ischemia Abnormal ECG When compared with ECG of 14-JAN-2024 13:12, (unconfirmed) Fusion complexes are now Present QRS duration has increased Serial changes of Septal infarct Present    ECG 12 Lead    Result Date: 1/15/2024  See ED provider note for full interpretation and clinical correlation    US gallbladder    Result Date: 1/14/2024  Interpreted By:  Houston Conde, STUDY: US GALLBLADDER;  1/14/2024 7:16 pm   INDICATION: Signs/Symptoms:Vomiting, fever, gallbladder edema on CT, assess for cholecystitis.   COMPARISON: None.   ACCESSION NUMBER(S): [id number]   ORDERING CLINICIAN: BANDAR JANG   TECHNIQUE: Multiple sonographic grayscale and color images of the right upper quadrant were performed.   FINDINGS: The liver demonstrates normal echogenicity. There is gallbladder wall thickening measuring 10 mm in thickness and pericholecystic edema. No definite evidence of gallstones. Per the sonographer, sonographic Muñiz sign is absent. The common bile duct measures 3 mm in diameter.   There is limited evaluation of the pancreas secondary to shadowing from overlying bowel gas.   The right kidney measures 8.6 cm in length. No right hydronephrosis.       Prominent gallbladder wall thickening measuring 10 mm in thickness and pericholecystic edema. There is however no definite evidence of gallstones and per the sonographer, sonographic Muñiz sign is absent. The gallbladder wall  thickening is therefore nonspecific and may relate to underlying liver disease, hypoproteinemia or cardiac disease. Clinical correlation is recommended and if there is concern for acalculous cholecystitis, HIDA scan may be obtained for further evaluation.   MACRO: None   Signed by: Houston Conde 1/14/2024 7:32 PM Dictation workstation:   [id number]    CT chest abdomen pelvis wo IV contrast    Result Date: 1/14/2024  Interpreted By:  Houston Conde, STUDY: CT CHEST ABDOMEN PELVIS WO CONTRAST;  1/14/2024 4:58 pm   INDICATION: Signs/Symptoms:sob and pain.   COMPARISON: 2/9/2023   ACCESSION NUMBER(S): [id number]   ORDERING CLINICIAN: RADHA VALDEZ   TECHNIQUE: Contiguous axial images of the chest, abdomen and pelvis were obtained without intravenous contrast. Coronal and sagittal reformatted images were obtained from the axial images.   FINDINGS: CT CHEST:   The examination is limited secondary lack of intravenous contrast.   There is limited evaluation of the vascular structures on the noncontrast examination. There is air in the left subclavian vasculature in the left arm and also vasculature in the imaged lower neck.  There is minimal air in the central pulmonary vein. There is also air in vasculature in the chest wall.   Hyperdensity in vasculature in superior mediastinum may relate to vascular calcification.   Prominent lymph nodes in the imaged lower neck measures 1.4 cm mildly prominent axillary lymph nodes. Evaluation for mediastinal and hilar lymphadenopathy is limited on noncontrast examination. There are multiple prominent superior mediastinal lymph nodes which measure up to 1.8 cm. 1.9 cm subcarinal lymph node. There also prominent periaortic lymph nodes which measure up to 1.3 cm.   The heart is normal in size. Coronary artery atherosclerotic calcifications. There is moderate size left pleural effusion which appears partially loculated and small right pleural effusion. There is bibasilar  atelectatic/consolidative opacity. There is a new 2 cm x 0.9 cm subpleural opacity in the right lower lobe with mild calcifications. No pneumothorax.   Mild bilateral gynecomastia.   No acute fracture of the thoracic spine.   CT ABDOMEN PELVIS:   Evaluation the abdomen pelvis is limited secondary to lack of intravenous contrast. Limited evaluation for liver mass on noncontrast examination. There is gallbladder wall thickening and pericholecystic edema.   Limited evaluation the pancreas secondary to lack of intravenous contrast. There is a tubular opacity measuring 5 mm in diameter the pancreatic tail may relate to distal pancreatic duct dilatation.   No splenomegaly.   The adrenal glands appear unremarkable.   There are multiple bilateral renal cysts and subcentimeter hypodensities too small to characterize. There are also several in indeterminate renal lesions may correspond to proteinaceous/hemorrhagic cysts however there is limited evaluation for renal mass on noncontrast examination. Bilateral renal vascular calcifications and nonobstructive calculi. No hydronephrosis.   Atherosclerotic calcification of the aorta and abdominal and pelvic vasculature.   There are multiple prominent retroperitoneal lymph nodes which are also seen on the prior examination and measured 1.4 cm enlarged right inguinal lymph node is stable measures 2.2 cm and 1.3 cm.   There is partially calcified density subcutaneous fat in the left anterior abdominal wall which is more density in comparison to prior examination. There is subcutaneous edema in the abdominal and pelvic wall. There is air in vasculature in the abdominal wall.   The stomach is underdistended and not well evaluated. No evidence of bowel obstruction. Evaluation the bowel is overall limited on the noncontrast examination.   Underdistention versus urinary bladder wall thickening.   There is a right femoral central venous catheter which terminates in the chest at level of the  SVC.   Multilevel degenerative change of the lumbar spine and changes of renal osteodystrophy. Multilevel Schmorl's nodes.       1.   Air in vasculature in the chest, abdomen and pelvis as described above which may relate to vascular access, however clinical correlation is recommended if there is concern for other etiologies.   2. Moderate size partially loculated left pleural effusion and small right pleural effusion and bilateral atelectatic/consolidative opacities. New 2 cm x 0.9 cm subpleural opacity right lower lobe with mild calcifications.   3.  Right femoral central venous catheter which extends into the chest and terminates in the SVC.   4.  Lymphadenopathy in the imaged lower neck and mediastinal lymphadenopathy. Multiple prominent retroperitoneal lymph nodes are also noted and enlarged inguinal lymph nodes. The lymph nodes may be reactive in nature, however clinical correlation is recommended there is concern for malignancy or other etiology.   5.   Gallbladder wall thickening and pericholecystic edema is nonspecific and may relate to underlying liver disease, hypoproteinemia or cardiac disease, however if there is concern for acute cholecystitis/acute gallbladder pathology, right upper quadrant ultrasound is recommended for further evaluation.   6.   Low attenuation tubular opacity in the pancreatic tail measuring 5 mm in diameter may correspond to dilated pancreatic duct and may be better assessed on follow-up contrast enhanced imaging.   7.  Bilateral renal atrophy and multiple bilateral renal cysts and subcentimeter hypodensities too small to characterize. Limited evaluation for renal mass on noncontrast examination.   8.  Extensive atherosclerotic calcification in the chest, abdomen and pelvis.   9.  Underdistention versus urinary bladder wall thickening; please correlate urinalysis to evaluate for cystitis.   MACRO: None   Signed by: Houston Conde 1/14/2024 6:05 PM Dictation workstation:    [id number]    XR chest 2 views    Result Date: 1/14/2024  Interpreted By:  Jarvis Holcomb, STUDY: Chest dated  1/14/2024.   INDICATION: Signs/Symptoms:pain   COMPARISON: Chest dated 11/10/2023.   ACCESSION NUMBER(S): [id number]   ORDERING CLINICIAN: RADHA VALDEZ   TECHNIQUE: One view of the chest.   FINDINGS: There is left basilar opacity with blunting of the costophrenic angle. Linear opacities are seen in the right lower lung zone.  No pneumothorax is evident. The cardiomediastinal silhouette is  not enlarged.Degenerative change is seen of the spine and shoulders.       1. Small left pleural effusion with associated atelectasis and/or pneumonitis. 2. Right basilar atelectasis and/or scarring.   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  In ED CT scan was unrevealing for definitive source of infection but suggests bilateral pleural effusions some of which may be loculated.  He has been given abx and fluids but requires levophed prompting admission to the ICU for critical care needs.         Plan:     Neuro:   - Neuro and pain assessment per protocol  - Tylenol as needed for acute pain     CV:  Hypotension likely 2/2 sepsis, requiring vasopressor support, has remained off pressors  - NIBP and tele monitoring  - IVF boluses as needed  - Maintain MAP >65     Pulm:  - Sats >90%  - Bronch hygiene     GI:  - Regular diet  - PPI  - As needed bowel regimen     Renal:   ESRD on IHD  R Femoral tunneled catheter  -  Nephrology consulted, appreciate recs  - Daily RFP and replete electrolytes as needed  - IHD per nephrology  - Renvela with meals     Endo:   IDDM2  - Hold home lantus, will likely resume tomorrow  - ISS     Heme:   - Heparin for DVTppx  - Daily CBC     ID:   Febrile, no leukocytosis, however, it is trending up.    MRSA bacteremia  BC positive for MRSA  - Continue vancomycin and zosyn  - ID consulted  - Removal of R femoral catheter today by IR, will attempt to place PIV access prior to line removal.     Dispo: Keep in ICU     Discussed with Dr. Cota        I spent 45 minutes in the professional and overall care of this patient.      Aashish Doll, APRN-CNP

## 2024-01-17 ENCOUNTER — APPOINTMENT (OUTPATIENT)
Dept: RADIOLOGY | Facility: HOSPITAL | Age: 39
DRG: 314 | End: 2024-01-17
Payer: COMMERCIAL

## 2024-01-17 LAB
ALBUMIN SERPL BCP-MCNC: 2.7 G/DL (ref 3.4–5)
ANION GAP SERPL CALC-SCNC: 29 MMOL/L (ref 10–20)
BUN SERPL-MCNC: 41 MG/DL (ref 6–23)
CALCIUM SERPL-MCNC: 8.5 MG/DL (ref 8.6–10.3)
CHLORIDE SERPL-SCNC: 89 MMOL/L (ref 98–107)
CO2 SERPL-SCNC: 20 MMOL/L (ref 21–32)
CREAT SERPL-MCNC: 6.42 MG/DL (ref 0.5–1.3)
EGFRCR SERPLBLD CKD-EPI 2021: 11 ML/MIN/1.73M*2
ERYTHROCYTE [DISTWIDTH] IN BLOOD BY AUTOMATED COUNT: 21.1 % (ref 11.5–14.5)
GLUCOSE BLD MANUAL STRIP-MCNC: 141 MG/DL (ref 74–99)
GLUCOSE BLD MANUAL STRIP-MCNC: 200 MG/DL (ref 74–99)
GLUCOSE BLD MANUAL STRIP-MCNC: 281 MG/DL (ref 74–99)
GLUCOSE BLD MANUAL STRIP-MCNC: 325 MG/DL (ref 74–99)
GLUCOSE SERPL-MCNC: 295 MG/DL (ref 74–99)
HCT VFR BLD AUTO: 24.3 % (ref 41–52)
HGB BLD-MCNC: 7.9 G/DL (ref 13.5–17.5)
MAGNESIUM SERPL-MCNC: 2.1 MG/DL (ref 1.6–2.4)
MCH RBC QN AUTO: 26.2 PG (ref 26–34)
MCHC RBC AUTO-ENTMCNC: 32.5 G/DL (ref 32–36)
MCV RBC AUTO: 81 FL (ref 80–100)
NRBC BLD-RTO: 0 /100 WBCS (ref 0–0)
PHOSPHATE SERPL-MCNC: 5.4 MG/DL (ref 2.5–4.9)
PLATELET # BLD AUTO: 115 X10*3/UL (ref 150–450)
POTASSIUM SERPL-SCNC: 5 MMOL/L (ref 3.5–5.3)
RBC # BLD AUTO: 3.01 X10*6/UL (ref 4.5–5.9)
SODIUM SERPL-SCNC: 133 MMOL/L (ref 136–145)
VANCOMYCIN SERPL-MCNC: 26.5 UG/ML (ref 5–20)
WBC # BLD AUTO: 9.4 X10*3/UL (ref 4.4–11.3)

## 2024-01-17 PROCEDURE — 80202 ASSAY OF VANCOMYCIN: CPT | Performed by: NURSE PRACTITIONER

## 2024-01-17 PROCEDURE — 2500000002 HC RX 250 W HCPCS SELF ADMINISTERED DRUGS (ALT 637 FOR MEDICARE OP, ALT 636 FOR OP/ED): Performed by: NURSE PRACTITIONER

## 2024-01-17 PROCEDURE — 85027 COMPLETE CBC AUTOMATED: CPT | Performed by: NURSE PRACTITIONER

## 2024-01-17 PROCEDURE — 74018 RADEX ABDOMEN 1 VIEW: CPT | Performed by: RADIOLOGY

## 2024-01-17 PROCEDURE — 2500000004 HC RX 250 GENERAL PHARMACY W/ HCPCS (ALT 636 FOR OP/ED): Performed by: NURSE PRACTITIONER

## 2024-01-17 PROCEDURE — 2500000001 HC RX 250 WO HCPCS SELF ADMINISTERED DRUGS (ALT 637 FOR MEDICARE OP): Performed by: NURSE PRACTITIONER

## 2024-01-17 PROCEDURE — 36415 COLL VENOUS BLD VENIPUNCTURE: CPT | Performed by: NURSE PRACTITIONER

## 2024-01-17 PROCEDURE — 80069 RENAL FUNCTION PANEL: CPT | Performed by: NURSE PRACTITIONER

## 2024-01-17 PROCEDURE — 82947 ASSAY GLUCOSE BLOOD QUANT: CPT

## 2024-01-17 PROCEDURE — 83735 ASSAY OF MAGNESIUM: CPT | Performed by: NURSE PRACTITIONER

## 2024-01-17 PROCEDURE — 6350000001 HC RX 635 EPOETIN >10,000 UNITS: Mod: JZ | Performed by: INTERNAL MEDICINE

## 2024-01-17 PROCEDURE — 87493 C DIFF AMPLIFIED PROBE: CPT | Mod: AHULAB | Performed by: INTERNAL MEDICINE

## 2024-01-17 PROCEDURE — 74018 RADEX ABDOMEN 1 VIEW: CPT

## 2024-01-17 PROCEDURE — 2060000001 HC INTERMEDIATE ICU ROOM DAILY

## 2024-01-17 RX ADMIN — INSULIN LISPRO 6 UNITS: 100 INJECTION, SOLUTION INTRAVENOUS; SUBCUTANEOUS at 09:49

## 2024-01-17 RX ADMIN — ONDANSETRON 4 MG: 2 INJECTION INTRAMUSCULAR; INTRAVENOUS at 00:24

## 2024-01-17 RX ADMIN — HEPARIN SODIUM 5000 UNITS: 5000 INJECTION INTRAVENOUS; SUBCUTANEOUS at 15:27

## 2024-01-17 RX ADMIN — ONDANSETRON 4 MG: 2 INJECTION INTRAMUSCULAR; INTRAVENOUS at 21:55

## 2024-01-17 RX ADMIN — HEPARIN SODIUM 5000 UNITS: 5000 INJECTION INTRAVENOUS; SUBCUTANEOUS at 21:47

## 2024-01-17 RX ADMIN — HEPARIN SODIUM 5000 UNITS: 5000 INJECTION INTRAVENOUS; SUBCUTANEOUS at 05:35

## 2024-01-17 RX ADMIN — INSULIN LISPRO 4 UNITS: 100 INJECTION, SOLUTION INTRAVENOUS; SUBCUTANEOUS at 17:30

## 2024-01-17 RX ADMIN — INSULIN LISPRO 8 UNITS: 100 INJECTION, SOLUTION INTRAVENOUS; SUBCUTANEOUS at 12:30

## 2024-01-17 RX ADMIN — EPOETIN ALFA-EPBX 10000 UNITS: 10000 INJECTION, SOLUTION INTRAVENOUS; SUBCUTANEOUS at 18:41

## 2024-01-17 RX ADMIN — LEVOTHYROXINE SODIUM 125 MCG: 125 TABLET ORAL at 05:35

## 2024-01-17 ASSESSMENT — PAIN SCALES - GENERAL
PAINLEVEL_OUTOF10: 0 - NO PAIN

## 2024-01-17 ASSESSMENT — PAIN - FUNCTIONAL ASSESSMENT
PAIN_FUNCTIONAL_ASSESSMENT: 0-10

## 2024-01-17 NOTE — PROGRESS NOTES
"Vancomycin Dosing by Pharmacy- FOLLOW UP    Edwar Hemphill is a 38 y.o. year old male who Pharmacy has been consulted for vancomycin dosing fpneumoniaor pneumonia and bacteremia. Based on the patient's indication and renal status this patient is being dosed based on a goal pre-HD level of 15-25.     Renal function is currently stable. On HD    Current vancomycin dose: 750 mg given every HD Session    Most recent random level: 26.5 mcg/mL on 1/17 @ 0541    Visit Vitals  /54   Pulse 104   Temp 36 °C (96.8 °F) (Temporal)   Resp 23        Lab Results   Component Value Date    CREATININE 6.42 (H) 01/17/2024    CREATININE 5.24 (H) 01/16/2024    CREATININE 8.25 (H) 01/15/2024    CREATININE 7.74 (H) 01/14/2024        Patient weight is No results found for: \"PTWEIGHT\"    No results found for: \"CULTURE\"     I/O last 3 completed shifts:  In: 1.5 (0 mL/kg) [I.V.:1.5 (0 mL/kg)]  Out: 600 (8.3 mL/kg) [Emesis/NG output:600]  Weight: 72.5 kg   [unfilled]    Lab Results   Component Value Date    PATIENTTEMP 37.0 01/14/2024    PATIENTTEMP 37.0 07/09/2023    PATIENTTEMP 37.0 07/08/2023    PATIENTTEMP 37.0 07/08/2023        Assessment/Plan    Above goal trough at 26.5. Will hold the dose for today    The next level will be obtained on 1/19 at 0500. May be obtained sooner if clinically indicated.   Will continue to monitor renal function daily while on vancomycin and order serum creatinine at least every 48 hours if not already ordered.  Follow for continued vancomycin needs, clinical response, and signs/symptoms of toxicity.       Deep Rich, PharmD           "

## 2024-01-17 NOTE — PROGRESS NOTES
Edwar Hemphill is a 38 y.o. male on day 3 of admission presenting with Septic shock (CMS/HCC).         Patient's dialysis line was removed yesterday by IR. ID is following with IV abx. Repeat blood cultures today. Patient is not medically ready for discharge today.

## 2024-01-17 NOTE — CONSULTS
Reason For Consult  ESRD on hemodialysis     History Of Present Illness  Edwar Hemphill is a 38 y.o. male with a past medical history of end-stage renal disease on hemodialysis via right femoral TDC who resides at [facility]. 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  He later had stump debridement on 7/27 with wound VAC placement.  He had polymicrobial infection including resistant E coli, Acinetobacter baumannii and Enterobacter fecalis. He was admitted in September with positive blood cultures growing Acenetobacter baumannii and Stenotrophomonas. He went to IR for dialysis line exchange.  He then was admitted again in November where CT noted left sided fluid collection concerning for abscess.  He grew MRSA and Streptococcus, catheter was exchanged on November 13, he completed vancomycin at [facility].      He comes in now with fever, chills, congestion.  Attempt was made at triple-lumen catheter placement in the neck, could not pass a wire.  Was seen by Dr. Bullock, on norepinephrine, vancomycin and Zosyn.  We dialyzed him on Monday, off of pressors.  No overnight events.  Dialyze him and pulled his catheter.    Past Medical History:   Diagnosis Date    Essential (primary) hypertension 05/26/2017    HTN (hypertension), benign    Personal history of other endocrine, nutritional and metabolic disease 05/26/2017    History of diabetes mellitus       Past Surgical History:   Procedure Laterality Date    CT AORTA AND BILATERAL ILIOFEMORAL RUNOFF ANGIOGRAM W AND/OR WO IV CONTRAST  2/9/2023    CT AORTA AND BILATERAL ILIOFEMORAL RUNOFF ANGIOGRAM W AND/OR WO IV CONTRAST 2/9/2023 [facility]    IR CVC EXCHANGE  11/13/2023    IR CVC EXCHANGE 11/13/2023 [facility]       Social History     Tobacco Use    Smoking status: Every Day     Packs/day: 0.25     Years: 15.00     Additional pack years: 0.00     Total pack years: 3.75     Types: Cigarettes    Smokeless tobacco: Never        Family History  Family History   Problem Relation Name Age of Onset    Other (DIABETES DUE TO UNDERLYING CONDITION WITH MICROALBUMINURIA) Father      Other (DIABETES DUE TO UNDERLYING CONDITION WITH MICROALBUMINURIA [Other]) Other AUNT     Other (DIABETES DUE TO UNDERLYING CONDITION WITH MICROALBUMINURIA [Other]) Other GP         Allergies  Cashew nut    Review of Systems   10 point review of systems obtained and negative unless stated in HPI:     Physical Exam   Constitutional: Well developed, awake/alert/oriented  x3   Eyes: Periorbital swelling   ENMT: mucous membranes moist   Head/Neck: Facial edema  Difficult to assess JVD   Respiratory/Thorax: Diminished bibasilar breath sounds,  no wheezing, rales or rhonchi   Cardiovascular: regular rhythm, normal  S1 and S2   Gastrointestinal: Nondistended, soft, non-tender,  no rebound tenderness or guarding   Genitourinary: No Castanon   Extremities: Less edema on right than prior   L AKA   RUE amputation  R femoral HD catheter   Neurological: No asterixis   Psychological: Appropriate mood and behavior   Skin: L AKA stump  Right femoral TDC           I&O 24HR  No intake or output data in the 24 hours ending 01/17/24 1016      Vitals 24HR  Heart Rate:  []   Temp:  [35.9 °C (96.6 °F)-36.3 °C (97.3 °F)]   Resp:  [17-34]   BP: (100-145)/()   SpO2:  [91 %-100 %]     Scheduled Medications  epoetin tyson or biosimilar, 10,000 Units, subcutaneous, Once per day on Mon Wed Fri  heparin (porcine), 5,000 Units, subcutaneous, q8h  insulin lispro, 0-10 Units, subcutaneous, TID with meals  levothyroxine, 125 mcg, oral, Daily  lidocaine, 5 mL, infiltration, Once  lidocaine PF, 10 mL,  subcutaneous, Once  perflutren lipid microspheres, 1 mL of dilution, intravenous, Once in imaging  sevelamer carbonate, 2,400 mg, oral, TID with meals  sodium bicarbonate 1 mEq in lidocaine-epinephrine (Xylocaine W/EPI) 1 %-1:100,000 9 mL Syringe, 10 mL, subcutaneous, Once      Continuous medications       PRN medications: acetaminophen, bisacodyl, dextrose 10 % in water (D10W), dextrose, glucagon, heparin flush, ondansetron ODT **OR** ondansetron, vancomycin     Relevant Results  Results from last 7 days   Lab Units 01/17/24  0541 01/16/24  0355 01/15/24  0632 01/14/24  1319   WBC AUTO x10*3/uL 9.4 7.4 8.1 10.7   HEMOGLOBIN g/dL 7.9* 9.1* 10.0* 10.7*   HEMATOCRIT % 24.3* 29.6* 32.5* 35.2*   PLATELETS AUTO x10*3/uL 115* 124* 136* 190   NEUTROS PCT AUTO %  --   --   --  85.3   LYMPHS PCT AUTO %  --   --   --  7.7   MONOS PCT AUTO %  --   --   --  5.1   EOS PCT AUTO %  --   --   --  1.0        Results from last 7 days   Lab Units 01/17/24  0541 01/16/24  0355 01/15/24  0632 01/14/24  1319   SODIUM mmol/L 133* 135* 130* 131*   POTASSIUM mmol/L 5.0 4.0 4.4 4.4   CHLORIDE mmol/L 89* 95* 95* 93*   CO2 mmol/L 20* 17* 22 25   BUN mg/dL 41* 19 30* 27*   CREATININE mg/dL 6.42* 5.24* 8.25* 7.74*   CALCIUM mg/dL 8.5* 8.8 8.8 9.8   PROTEIN TOTAL g/dL  --   --   --  8.3*   BILIRUBIN TOTAL mg/dL  --   --   --  0.6   ALK PHOS U/L  --   --   --  183*   ALT U/L  --   --   --  10   AST U/L  --   --   --  13   GLUCOSE mg/dL 295* 170* 143* 85        Transthoracic Echo (TTE) Complete   Final Result      US gallbladder   Final Result   Prominent gallbladder wall thickening measuring 10 mm in thickness   and pericholecystic edema. There is however no definite evidence of   gallstones and per the sonographer, sonographic Muñiz sign is   absent. The gallbladder wall thickening is therefore nonspecific and   may relate to underlying liver disease, hypoproteinemia or cardiac   disease. Clinical correlation is recommended and if there is  concern   for acalculous cholecystitis, HIDA scan may be obtained for further   evaluation.        MACRO:   None        Signed by: Houston Conde 1/14/2024 7:32 PM   Dictation workstation:   [id number]      CT chest abdomen pelvis wo IV contrast   Final Result   1.   Air in vasculature in the chest, abdomen and pelvis as described   above which may relate to vascular access, however clinical   correlation is recommended if there is concern for other etiologies.        2. Moderate size partially loculated left pleural effusion and small   right pleural effusion and bilateral atelectatic/consolidative   opacities. New 2 cm x 0.9 cm subpleural opacity right lower lobe with   mild calcifications.        3.  Right femoral central venous catheter which extends into the   chest and terminates in the SVC.        4.  Lymphadenopathy in the imaged lower neck and mediastinal   lymphadenopathy. Multiple prominent retroperitoneal lymph nodes are   also noted and enlarged inguinal lymph nodes. The lymph nodes may be   reactive in nature, however clinical correlation is recommended there   is concern for malignancy or other etiology.        5.   Gallbladder wall thickening and pericholecystic edema is   nonspecific and may relate to underlying liver disease,   hypoproteinemia or cardiac disease, however if there is concern for   acute cholecystitis/acute gallbladder pathology, right upper quadrant   ultrasound is recommended for further evaluation.        6.   Low attenuation tubular opacity in the pancreatic tail measuring   5 mm in diameter may correspond to dilated pancreatic duct and may be   better assessed on follow-up contrast enhanced imaging.        7.  Bilateral renal atrophy and multiple bilateral renal cysts and   subcentimeter hypodensities too small to characterize. Limited   evaluation for renal mass on noncontrast examination.        8.  Extensive atherosclerotic calcification in the chest, abdomen and   pelvis.         9.  Underdistention versus urinary bladder wall thickening; please   correlate urinalysis to evaluate for cystitis.        MACRO:   None        Signed by: Houston Conde 1/14/2024 6:05 PM   Dictation workstation:   [id number]      XR chest 2 views   Final Result   1. Small left pleural effusion with associated atelectasis and/or   pneumonitis.   2. Right basilar atelectasis and/or scarring.        MACRO:   None        Signed by: Jarvis Holcomb 1/14/2024 2:51 PM   Dictation workstation:   [id number]      XR abdomen 1 view    (Results Pending)         Assessment/Plan     Edwar Hemphill is a 38 y.o. male with a past medical history of end-stage renal disease on hemodialysis via right femoral TDC who resides at [facility]. 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  He later had stump debridement on 7/27 with wound VAC placement.  He had polymicrobial infection including resistant E coli, Acinetobacter baumannii and Enterobacter fecalis. He was admitted in September with positive blood cultures growing Acenetobacter baumannii and Stenotrophomonas. He went to IR for dialysis line exchange.  He then was admitted again in November where CT noted left sided fluid collection concerning for abscess.  He grew MRSA and Streptococcus, catheter was exchanged on November 13, he completed vancomycin at [facility].      He comes in now with fever, chills, congestion.  Attempt was made at triple-lumen catheter placement in the neck, could not pass a wire.  Was seen by Dr. Bullock, on norepinephrine, vancomycin and Zosyn.    He is less fluid overloaded than is typical for him.  He had a full dialysis last  Friday on the 12th, I dialyzed him again yesterday.  But we have no choice but to give him a line holiday, to pull his femoral dialysis catheter.  He will never agree to an access in the left upper extremity as an infected graft may have led to his above the elbow amputation on the right.  Peritoneal dialysis may need to be considered which I discussed with he and his family member who was in the room.  He has MRSA in the blood, he will have a line holiday, may need a midline in the left upper extremity unless we can get an external jugular IV now.  It is a very complicated situation.  I am hoping that we can get a neck dialysis catheter to avoid the femoral position.  I will add erythropoietin 10,000 units subcu 3 times per week, first dose today.  He has abdominal discomfort and diarrhea.  He will get a KUB now, we will send his C. difficile.    Principal Problem:    Septic shock (CMS/HCC)      I spent 50 minutes in the professional and overall care of this patient.      Zeeshan Gonzalez MD

## 2024-01-17 NOTE — PROGRESS NOTES
Edwar Hemphill is a 38 y.o. male on day 3 of admission presenting with Septic shock (CMS/HCC).      Subjective   Chart reviewed   Bs are high , pt not on lantus          Objective     Last Recorded Vitals  /87   Pulse 105   Temp 36 °C (96.8 °F) (Temporal)   Resp 16   Wt 72.5 kg (159 lb 13.3 oz)   SpO2 94%   Intake/Output last 3 Shifts:  No intake or output data in the 24 hours ending 01/17/24 1838    Admission Weight  Weight: 79.4 kg (175 lb) (01/14/24 1258)    Daily Weight  01/16/24 : 72.5 kg (159 lb 13.3 oz)    Image Results  XR abdomen 1 view  Narrative: Interpreted By:  Dean Stanton,   STUDY:  XR ABDOMEN 1 VIEW;  1/17/2024 11:00 am      INDICATION:  Signs/Symptoms:abd pain.      COMPARISON:  None.      ACCESSION NUMBER(S):  [id number]      ORDERING CLINICIAN:  BRADEN QUIROGA      FINDINGS:  A KUB is obtained. A nonspecific nonobstructive bowel gas pattern.  Air-filled distended stomach. Apparent osteopenia.      Impression: 1. A nonspecific, nonobstructive bowel gas pattern.      MACRO:  None      Signed by: Dean Stanton 1/17/2024 11:29 AM  Dictation workstation:   [id number]      PHYSICAL EXAM  NEUROLOGICAL: No abnormal movements, no changes from before  HEENT: Head atraumatic, perrl,eomi, no oral lesions, no ear rash   NECK: Supple, no ln, jvp flat, thyroid palp  HEART: S1, S2, no added sound  LUNGS: dec a/e   EXTREMITIES: no edema  ABDOMEN: Soft, nontender, bowel sound positive, no organomegaly  SKIN: No change  EYES: No changes, perrl, eomi,   ENT: No change    JOINT: No change  Relevant Results               Assessment/Plan   This patient currently has cardiac telemetry ordered; if you would like to modify or discontinue the telemetry order, click here to go to the orders activity to modify/discontinue the order.    Principal Problem:    Septic shock (CMS/HCC)    T2dm  Esrd  Oa  Pvd  Gerd  Left pleural effusion  LN+--> multiple  Pancreatic tail lesion-> needs mri   Extensive atherosclerotic  calcification              Mauricio Estrada MD

## 2024-01-17 NOTE — PROGRESS NOTES
"  INFECTIOUS DISEASE DAILY PROGRESS NOTE    SUBJECTIVE:    No new events. Afebrile. Line will be removed today. There has been issues with IV access peripherally.     OBJECTIVE:  VITALS (Last 24 Hours)  /63   Pulse 98   Temp 36 °C (96.8 °F) (Temporal)   Resp 19   Ht 1.753 m (5' 9\")   Wt 72.5 kg (159 lb 13.3 oz)   SpO2 96%   BMI 23.60 kg/m²     PHYSICAL EXAM:  Gen - NAD, in bed  Lungs - clear, no wheezing  Abd - mild distension, BS present  LLE - s/p AKA  Skin - no rashes  Misc - fem HD cath present    ABX: IV Vanc    LABS:  Lab Results   Component Value Date    WBC 9.4 01/17/2024    HGB 7.9 (L) 01/17/2024    HCT 24.3 (L) 01/17/2024    MCV 81 01/17/2024     (L) 01/17/2024     Lab Results   Component Value Date    GLUCOSE 295 (H) 01/17/2024    CALCIUM 8.5 (L) 01/17/2024     (L) 01/17/2024    K 5.0 01/17/2024    CO2 20 (L) 01/17/2024    CL 89 (L) 01/17/2024    BUN 41 (H) 01/17/2024    CREATININE 6.42 (H) 01/17/2024     Results from last 72 hours   Lab Units 01/17/24  0541 01/15/24  0632 01/14/24  1319   ALK PHOS U/L  --   --  183*   BILIRUBIN TOTAL mg/dL  --   --  0.6   PROTEIN TOTAL g/dL  --   --  8.3*   ALT U/L  --   --  10   AST U/L  --   --  13   ALBUMIN g/dL 2.7*   < > 3.6    < > = values in this interval not displayed.     Estimated Creatinine Clearance: 15.4 mL/min (A) (by C-G formula based on SCr of 6.42 mg/dL (H)).      ASSESSMENT/PLAN:    CLABSI (HD cath POA) due to Staph aureus - 3rd line infection in the last 3-4 months. HD cath removed 1/16. Repeat blood cx x2 today. TTE limited but no endocarditis.  ESRD on HD    IV Vancomycin. I think we will need a POLI.    Will follow. Thanks! D/w ICU team    Mat Jauregui MD  [facility]  Office #[phone]    "

## 2024-01-18 ENCOUNTER — APPOINTMENT (OUTPATIENT)
Dept: RADIOLOGY | Facility: HOSPITAL | Age: 39
DRG: 314 | End: 2024-01-18
Payer: COMMERCIAL

## 2024-01-18 LAB
ALBUMIN SERPL BCP-MCNC: 2.7 G/DL (ref 3.4–5)
ALBUMIN SERPL BCP-MCNC: 2.8 G/DL (ref 3.4–5)
ALP SERPL-CCNC: 133 U/L (ref 33–120)
ALT SERPL W P-5'-P-CCNC: 9 U/L (ref 10–52)
ANION GAP SERPL CALC-SCNC: 22 MMOL/L (ref 10–20)
AST SERPL W P-5'-P-CCNC: 12 U/L (ref 9–39)
BACTERIA BLD AEROBE CULT: ABNORMAL
BACTERIA BLD AEROBE CULT: ABNORMAL
BACTERIA BLD CULT: ABNORMAL
BACTERIA BLD CULT: ABNORMAL
BILIRUB DIRECT SERPL-MCNC: 0.2 MG/DL (ref 0–0.3)
BILIRUB SERPL-MCNC: 0.5 MG/DL (ref 0–1.2)
BUN SERPL-MCNC: 46 MG/DL (ref 6–23)
C DIF TOX TCDA+TCDB STL QL NAA+PROBE: NOT DETECTED
CALCIUM SERPL-MCNC: 9 MG/DL (ref 8.6–10.3)
CHLORIDE SERPL-SCNC: 94 MMOL/L (ref 98–107)
CO2 SERPL-SCNC: 24 MMOL/L (ref 21–32)
CREAT SERPL-MCNC: 6.97 MG/DL (ref 0.5–1.3)
EGFRCR SERPLBLD CKD-EPI 2021: 10 ML/MIN/1.73M*2
ERYTHROCYTE [DISTWIDTH] IN BLOOD BY AUTOMATED COUNT: 21.2 % (ref 11.5–14.5)
GLUCOSE BLD MANUAL STRIP-MCNC: 170 MG/DL (ref 74–99)
GLUCOSE BLD MANUAL STRIP-MCNC: 172 MG/DL (ref 74–99)
GLUCOSE BLD MANUAL STRIP-MCNC: 180 MG/DL (ref 74–99)
GLUCOSE BLD MANUAL STRIP-MCNC: 197 MG/DL (ref 74–99)
GLUCOSE SERPL-MCNC: 131 MG/DL (ref 74–99)
GRAM STN SPEC: ABNORMAL
HCT VFR BLD AUTO: 25.6 % (ref 41–52)
HGB BLD-MCNC: 8.1 G/DL (ref 13.5–17.5)
MCH RBC QN AUTO: 26.3 PG (ref 26–34)
MCHC RBC AUTO-ENTMCNC: 31.6 G/DL (ref 32–36)
MCV RBC AUTO: 83 FL (ref 80–100)
NRBC BLD-RTO: 0 /100 WBCS (ref 0–0)
PHOSPHATE SERPL-MCNC: 5 MG/DL (ref 2.5–4.9)
PLATELET # BLD AUTO: 141 X10*3/UL (ref 150–450)
POTASSIUM SERPL-SCNC: 3.6 MMOL/L (ref 3.5–5.3)
PROT SERPL-MCNC: 6.4 G/DL (ref 6.4–8.2)
RBC # BLD AUTO: 3.08 X10*6/UL (ref 4.5–5.9)
SODIUM SERPL-SCNC: 136 MMOL/L (ref 136–145)
WBC # BLD AUTO: 8.3 X10*3/UL (ref 4.4–11.3)

## 2024-01-18 PROCEDURE — 78227 HEPATOBIL SYST IMAGE W/DRUG: CPT | Mod: 52

## 2024-01-18 PROCEDURE — 2500000004 HC RX 250 GENERAL PHARMACY W/ HCPCS (ALT 636 FOR OP/ED): Performed by: NURSE PRACTITIONER

## 2024-01-18 PROCEDURE — 99222 1ST HOSP IP/OBS MODERATE 55: CPT | Performed by: INTERNAL MEDICINE

## 2024-01-18 PROCEDURE — 87040 BLOOD CULTURE FOR BACTERIA: CPT | Mod: AHULAB | Performed by: NURSE PRACTITIONER

## 2024-01-18 PROCEDURE — 85027 COMPLETE CBC AUTOMATED: CPT | Performed by: NURSE PRACTITIONER

## 2024-01-18 PROCEDURE — 3430000001 HC RX 343 DIAGNOSTIC RADIOPHARMACEUTICALS: Performed by: SPECIALIST

## 2024-01-18 PROCEDURE — 82040 ASSAY OF SERUM ALBUMIN: CPT | Performed by: NURSE PRACTITIONER

## 2024-01-18 PROCEDURE — 78226 HEPATOBILIARY SYSTEM IMAGING: CPT | Mod: REDUCED SERVICES | Performed by: STUDENT IN AN ORGANIZED HEALTH CARE EDUCATION/TRAINING PROGRAM

## 2024-01-18 PROCEDURE — C9113 INJ PANTOPRAZOLE SODIUM, VIA: HCPCS | Performed by: NURSE PRACTITIONER

## 2024-01-18 PROCEDURE — 36415 COLL VENOUS BLD VENIPUNCTURE: CPT | Performed by: NURSE PRACTITIONER

## 2024-01-18 PROCEDURE — A9537 TC99M MEBROFENIN: HCPCS | Performed by: SPECIALIST

## 2024-01-18 PROCEDURE — 2500000001 HC RX 250 WO HCPCS SELF ADMINISTERED DRUGS (ALT 637 FOR MEDICARE OP): Performed by: NURSE PRACTITIONER

## 2024-01-18 PROCEDURE — 1200000002 HC GENERAL ROOM WITH TELEMETRY DAILY

## 2024-01-18 PROCEDURE — 76937 US GUIDE VASCULAR ACCESS: CPT

## 2024-01-18 PROCEDURE — 2500000002 HC RX 250 W HCPCS SELF ADMINISTERED DRUGS (ALT 637 FOR MEDICARE OP, ALT 636 FOR OP/ED): Performed by: NURSE PRACTITIONER

## 2024-01-18 PROCEDURE — 84100 ASSAY OF PHOSPHORUS: CPT | Performed by: NURSE PRACTITIONER

## 2024-01-18 PROCEDURE — 82947 ASSAY GLUCOSE BLOOD QUANT: CPT

## 2024-01-18 RX ORDER — KIT FOR THE PREPARATION OF TECHNETIUM TC 99M MEBROFENIN 45 MG/10ML
5.5 INJECTION, POWDER, LYOPHILIZED, FOR SOLUTION INTRAVENOUS
Status: COMPLETED | OUTPATIENT
Start: 2024-01-18 | End: 2024-01-18

## 2024-01-18 RX ORDER — PANTOPRAZOLE SODIUM 40 MG/1
40 TABLET, DELAYED RELEASE ORAL 2 TIMES DAILY
Status: DISCONTINUED | OUTPATIENT
Start: 2024-01-18 | End: 2024-01-23 | Stop reason: HOSPADM

## 2024-01-18 RX ORDER — PANTOPRAZOLE SODIUM 40 MG/10ML
40 INJECTION, POWDER, LYOPHILIZED, FOR SOLUTION INTRAVENOUS 2 TIMES DAILY
Status: DISCONTINUED | OUTPATIENT
Start: 2024-01-18 | End: 2024-01-23 | Stop reason: HOSPADM

## 2024-01-18 RX ADMIN — INSULIN LISPRO 2 UNITS: 100 INJECTION, SOLUTION INTRAVENOUS; SUBCUTANEOUS at 09:08

## 2024-01-18 RX ADMIN — PANTOPRAZOLE SODIUM 40 MG: 40 INJECTION, POWDER, FOR SOLUTION INTRAVENOUS at 20:37

## 2024-01-18 RX ADMIN — HEPARIN SODIUM 5000 UNITS: 5000 INJECTION INTRAVENOUS; SUBCUTANEOUS at 06:29

## 2024-01-18 RX ADMIN — HEPARIN SODIUM 5000 UNITS: 5000 INJECTION INTRAVENOUS; SUBCUTANEOUS at 14:23

## 2024-01-18 RX ADMIN — KIT FOR THE PREPARATION OF TECHNETIUM TC 99M MEBROFENIN 5.5 MILLICURIE: 45 INJECTION, POWDER, LYOPHILIZED, FOR SOLUTION INTRAVENOUS at 07:38

## 2024-01-18 RX ADMIN — ONDANSETRON 4 MG: 2 INJECTION INTRAMUSCULAR; INTRAVENOUS at 09:07

## 2024-01-18 RX ADMIN — PANTOPRAZOLE SODIUM 40 MG: 40 INJECTION, POWDER, FOR SOLUTION INTRAVENOUS at 09:06

## 2024-01-18 RX ADMIN — INSULIN LISPRO 2 UNITS: 100 INJECTION, SOLUTION INTRAVENOUS; SUBCUTANEOUS at 12:21

## 2024-01-18 RX ADMIN — SEVELAMER CARBONATE 2400 MG: 800 TABLET, FILM COATED ORAL at 12:22

## 2024-01-18 ASSESSMENT — PAIN SCALES - GENERAL
PAINLEVEL_OUTOF10: 0 - NO PAIN
PAINLEVEL_OUTOF10: 0 - NO PAIN

## 2024-01-18 ASSESSMENT — COGNITIVE AND FUNCTIONAL STATUS - GENERAL
WALKING IN HOSPITAL ROOM: TOTAL
MOVING FROM LYING ON BACK TO SITTING ON SIDE OF FLAT BED WITH BEDRAILS: A LOT
TURNING FROM BACK TO SIDE WHILE IN FLAT BAD: A LOT
HELP NEEDED FOR BATHING: A LOT
CLIMB 3 TO 5 STEPS WITH RAILING: TOTAL
MOVING TO AND FROM BED TO CHAIR: TOTAL
DRESSING REGULAR UPPER BODY CLOTHING: A LOT
MOVING TO AND FROM BED TO CHAIR: TOTAL
DRESSING REGULAR LOWER BODY CLOTHING: TOTAL
MOBILITY SCORE: 8
DAILY ACTIVITIY SCORE: 12
CLIMB 3 TO 5 STEPS WITH RAILING: TOTAL
DAILY ACTIVITIY SCORE: 12
PERSONAL GROOMING: A LOT
DRESSING REGULAR LOWER BODY CLOTHING: TOTAL
TOILETING: TOTAL
WALKING IN HOSPITAL ROOM: TOTAL
STANDING UP FROM CHAIR USING ARMS: TOTAL
STANDING UP FROM CHAIR USING ARMS: TOTAL
TURNING FROM BACK TO SIDE WHILE IN FLAT BAD: A LOT
HELP NEEDED FOR BATHING: A LOT
PERSONAL GROOMING: A LOT
MOBILITY SCORE: 8
MOVING FROM LYING ON BACK TO SITTING ON SIDE OF FLAT BED WITH BEDRAILS: A LOT
TOILETING: TOTAL
DRESSING REGULAR UPPER BODY CLOTHING: A LOT

## 2024-01-18 ASSESSMENT — PAIN - FUNCTIONAL ASSESSMENT
PAIN_FUNCTIONAL_ASSESSMENT: 0-10
PAIN_FUNCTIONAL_ASSESSMENT: 0-10

## 2024-01-18 NOTE — PROGRESS NOTES
Edwar Hemphill is a 38 y.o. male on day 4 of admission presenting with Septic shock (CMS/HCC).         Patient's repeat blood cultures are pending and if they are negative 48-72 hours he will have tunneled dialysis line placed. ID is following. Patient is not med ready for discharge.

## 2024-01-18 NOTE — CARE PLAN
Problem: Skin  Goal: Prevent/minimize sheer/friction injuries  Outcome: Progressing  Goal: Promote/optimize nutrition  Outcome: Progressing  Goal: Promote skin healing  Outcome: Progressing     Problem: Safety  Goal: Patient will be injury free during hospitalization  Outcome: Progressing     Problem: Daily Care  Goal: Daily care needs are met  Outcome: Progressing   The patient's goals for the shift include      The clinical goals for the shift include Pt will remain HDS, pain managed, encourage oral intake, q2 turns

## 2024-01-18 NOTE — CONSULTS
Reason For Consult  ESRD on hemodialysis     History Of Present Illness  Edwar Hemphill is a 38 y.o. male with a past medical history of end-stage renal disease on hemodialysis via right femoral TDC who resides at [facility]. 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  He later had stump debridement on 7/27 with wound VAC placement.  He had polymicrobial infection including resistant E coli, Acinetobacter baumannii and Enterobacter fecalis. He was admitted in September with positive blood cultures growing Acinetobacter baumannii and Stenotrophomonas. He went to IR for dialysis line exchange.  He then was admitted again in November where CT noted left sided fluid collection concerning for abscess.  He grew MRSA and Streptococcus, catheter was exchanged on November 13, he completed vancomycin at [facility].      He comes in now with fever, chills, congestion.  Attempt was made at triple-lumen catheter placement in the neck, could not pass a wire.  Was seen by Dr. Bullock, on norepinephrine, vancomycin and Zosyn.  We dialyzed him and remove the dialysis line, this is day 2 of the line holiday.    Past Medical History:   Diagnosis Date    Essential (primary) hypertension 05/26/2017    HTN (hypertension), benign    Personal history of other endocrine, nutritional and metabolic disease 05/26/2017    History of diabetes mellitus       Past Surgical History:   Procedure Laterality Date    CT AORTA AND BILATERAL ILIOFEMORAL RUNOFF ANGIOGRAM W AND/OR WO IV CONTRAST  2/9/2023    CT AORTA AND BILATERAL ILIOFEMORAL RUNOFF ANGIOGRAM W AND/OR WO IV CONTRAST 2/9/2023 DOCTOR OFFICE LEGAC[facility]    IR CVC EXCHANGE   11/13/2023    IR CVC EXCHANGE 11/13/2023 U CVEPINV       Social History     Tobacco Use    Smoking status: Every Day     Packs/day: 0.25     Years: 15.00     Additional pack years: 0.00     Total pack years: 3.75     Types: Cigarettes    Smokeless tobacco: Never        Family History  Family History   Problem Relation Name Age of Onset    Other (DIABETES DUE TO UNDERLYING CONDITION WITH MICROALBUMINURIA) Father      Other (DIABETES DUE TO UNDERLYING CONDITION WITH MICROALBUMINURIA [Other]) Other AUNT     Other (DIABETES DUE TO UNDERLYING CONDITION WITH MICROALBUMINURIA [Other]) Other GP         Allergies  Cashew nut    Review of Systems   10 point review of systems obtained and negative unless stated in HPI:     Physical Exam   Constitutional: Well developed, awake/alert/oriented  x3   Eyes: Periorbital swelling   ENMT: mucous membranes moist   Head/Neck: Facial edema  Difficult to assess JVD   Respiratory/Thorax: Diminished bibasilar breath sounds,  no wheezing, rales or rhonchi   Cardiovascular: regular rhythm, normal  S1 and S2   Gastrointestinal: Nondistended, soft, non-tender,  no rebound tenderness or guarding   Genitourinary: No Castanon   Extremities: Less edema on right than prior   L AKA   RUE amputation  R femoral HD catheter   Neurological: No asterixis   Psychological: Appropriate mood and behavior   Skin: L AKA stump  Right femoral TDC           I&O 24HR    Intake/Output Summary (Last 24 hours) at 1/18/2024 1118  Last data filed at 1/18/2024 0800  Gross per 24 hour   Intake --   Output 450 ml   Net -450 ml         Vitals 24HR  Heart Rate:  []   Temp:  [35.8 °C (96.4 °F)-37.2 °C (98.9 °F)]   Resp:  [12-29]   BP: (130-143)/(55-87)   Weight:  [66.7 kg (147 lb 0.8 oz)]   SpO2:  [94 %-98 %]     Scheduled Medications  epoetin tyson or biosimilar, 10,000 Units, subcutaneous, Once per day on Mon Wed Fri  heparin (porcine), 5,000 Units, subcutaneous, q8h  insulin lispro, 0-10 Units, subcutaneous, TID with  meals  levothyroxine, 125 mcg, oral, Daily  lidocaine, 5 mL, infiltration, Once  lidocaine PF, 10 mL, subcutaneous, Once  pantoprazole, 40 mg, oral, BID   Or  pantoprazole, 40 mg, intravenous, BID  perflutren lipid microspheres, 1 mL of dilution, intravenous, Once in imaging  sevelamer carbonate, 2,400 mg, oral, TID with meals  sodium bicarbonate 1 mEq in lidocaine-epinephrine (Xylocaine W/EPI) 1 %-1:100,000 9 mL Syringe, 10 mL, subcutaneous, Once      Continuous medications       PRN medications: acetaminophen, bisacodyl, dextrose 10 % in water (D10W), dextrose, glucagon, heparin flush, ondansetron ODT **OR** ondansetron, vancomycin     Relevant Results  Results from last 7 days   Lab Units 01/18/24  0503 01/17/24  0541 01/16/24  0355 01/15/24  0632 01/14/24  1319   WBC AUTO x10*3/uL 8.3 9.4 7.4   < > 10.7   HEMOGLOBIN g/dL 8.1* 7.9* 9.1*   < > 10.7*   HEMATOCRIT % 25.6* 24.3* 29.6*   < > 35.2*   PLATELETS AUTO x10*3/uL 141* 115* 124*   < > 190   NEUTROS PCT AUTO %  --   --   --   --  85.3   LYMPHS PCT AUTO %  --   --   --   --  7.7   MONOS PCT AUTO %  --   --   --   --  5.1   EOS PCT AUTO %  --   --   --   --  1.0    < > = values in this interval not displayed.        Results from last 7 days   Lab Units 01/18/24  0503 01/17/24  0541 01/16/24  0355 01/15/24  0632 01/14/24  1319   SODIUM mmol/L 136 133* 135*   < > 131*   POTASSIUM mmol/L 3.6 5.0 4.0   < > 4.4   CHLORIDE mmol/L 94* 89* 95*   < > 93*   CO2 mmol/L 24 20* 17*   < > 25   BUN mg/dL 46* 41* 19   < > 27*   CREATININE mg/dL 6.97* 6.42* 5.24*   < > 7.74*   CALCIUM mg/dL 9.0 8.5* 8.8   < > 9.8   PROTEIN TOTAL g/dL 6.4  --   --   --  8.3*   BILIRUBIN TOTAL mg/dL 0.5  --   --   --  0.6   ALK PHOS U/L 133*  --   --   --  183*   ALT U/L 9*  --   --   --  10   AST U/L 12  --   --   --  13   GLUCOSE mg/dL 131* 295* 170*   < > 85    < > = values in this interval not displayed.        NM hepatobiliary   Final Result   1. Limited examination secondary to technical  difficulties with a   single static image approximately 90 minutes post-injection with   radiotracer uptake within the liver, gallbladder, and small bowel,   findings exclude acute cholecystitis.        I personally reviewed the images/study, and I agree with the findings   as stated above. This study was interpreted at [facility], [place], [place].        MACRO:   None        Signed by: Nessa Ayala 1/18/2024 9:53 AM   Dictation workstation:   [id number]      NM injection no scan   Final Result      XR abdomen 1 view   Final Result   1. A nonspecific, nonobstructive bowel gas pattern.        MACRO:   None        Signed by: Dean Stanton 1/17/2024 11:29 AM   Dictation workstation:   [id number]      Transthoracic Echo (TTE) Complete   Final Result      US gallbladder   Final Result   Prominent gallbladder wall thickening measuring 10 mm in thickness   and pericholecystic edema. There is however no definite evidence of   gallstones and per the sonographer, sonographic Muñiz sign is   absent. The gallbladder wall thickening is therefore nonspecific and   may relate to underlying liver disease, hypoproteinemia or cardiac   disease. Clinical correlation is recommended and if there is concern   for acalculous cholecystitis, HIDA scan may be obtained for further   evaluation.        MACRO:   None        Signed by: Houston Conde 1/14/2024 7:32 PM   Dictation workstation:   [id number]      CT chest abdomen pelvis wo IV contrast   Final Result   1.   Air in vasculature in the chest, abdomen and pelvis as described   above which may relate to vascular access, however clinical   correlation is recommended if there is concern for other etiologies.        2. Moderate size partially loculated left pleural effusion and small   right pleural effusion and bilateral atelectatic/consolidative   opacities. New 2 cm x 0.9 cm subpleural opacity right lower lobe with   mild calcifications.        3.   Right femoral central venous catheter which extends into the   chest and terminates in the SVC.        4.  Lymphadenopathy in the imaged lower neck and mediastinal   lymphadenopathy. Multiple prominent retroperitoneal lymph nodes are   also noted and enlarged inguinal lymph nodes. The lymph nodes may be   reactive in nature, however clinical correlation is recommended there   is concern for malignancy or other etiology.        5.   Gallbladder wall thickening and pericholecystic edema is   nonspecific and may relate to underlying liver disease,   hypoproteinemia or cardiac disease, however if there is concern for   acute cholecystitis/acute gallbladder pathology, right upper quadrant   ultrasound is recommended for further evaluation.        6.   Low attenuation tubular opacity in the pancreatic tail measuring   5 mm in diameter may correspond to dilated pancreatic duct and may be   better assessed on follow-up contrast enhanced imaging.        7.  Bilateral renal atrophy and multiple bilateral renal cysts and   subcentimeter hypodensities too small to characterize. Limited   evaluation for renal mass on noncontrast examination.        8.  Extensive atherosclerotic calcification in the chest, abdomen and   pelvis.        9.  Underdistention versus urinary bladder wall thickening; please   correlate urinalysis to evaluate for cystitis.        MACRO:   None        Signed by: Houston Conde 1/14/2024 6:05 PM   Dictation workstation:   [id number]      XR chest 2 views   Final Result   1. Small left pleural effusion with associated atelectasis and/or   pneumonitis.   2. Right basilar atelectasis and/or scarring.        MACRO:   None        Signed by: Jarvis Holcomb 1/14/2024 2:51 PM   Dictation workstation:   [id number]            Assessment/Plan     Edwar Hemphill is a 38 y.o. male with a past medical history of end-stage renal disease on hemodialysis via right femoral TDC who resides at [facility]. 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  He later had stump debridement on 7/27 with wound VAC placement.  He had polymicrobial infection including resistant E coli, Acinetobacter baumannii and Enterobacter fecalis. He was admitted in September with positive blood cultures growing Acinetobacter baumannii and Stenotrophomonas. He went to  for dialysis line exchange.  He then was admitted again in November where CT noted left sided fluid collection concerning for abscess.  He grew MRSA and Streptococcus, catheter was exchanged on November 13, he completed vancomycin at [facility].      He comes in now with fever, chills, congestion.  Attempt was made at triple-lumen catheter placement in the neck, could not pass a wire.  Was seen by Dr. Bullock, on norepinephrine, vancomycin and Zosyn.    He is less fluid overloaded than is typical for him.  He had a full dialysis last Friday on the 12th, I dialyzed him again on Tuesday, we then removed his dialysis line.  This is his second day of the line holiday.  Cultures are still negative but it looks as if he is having more cultures drawn today.  MRSA in the blood, we will follow that forward.  He will need either a temporary or tunneled line tomorrow, I will make him n.p.o. after midnight.  HIDA scan was done today, pending.  There are no emergent reasons to dialyze him today.     Principal Problem:    Septic shock (CMS/[facility])      I spent 50 minutes in the professional and overall care of this patient.      Zeeshan Gonzalez MD

## 2024-01-18 NOTE — PROGRESS NOTES
Edwar Hemphill is a 38 y.o. male on day 4 of admission presenting with Septic shock (CMS/HCC).      Subjective   Nausea is jailyn r       Objective     Last Recorded Vitals  /81 (BP Location: Left arm)   Pulse 96   Temp 36.7 °C (98.1 °F) (Temporal)   Resp 10   Wt 66.7 kg (147 lb 0.8 oz)   SpO2 95%   Intake/Output last 3 Shifts:    Intake/Output Summary (Last 24 hours) at 1/18/2024 1851  Last data filed at 1/18/2024 0800  Gross per 24 hour   Intake --   Output 250 ml   Net -250 ml       Admission Weight  Weight: 79.4 kg (175 lb) (01/14/24 1258)    Daily Weight  01/18/24 : 66.7 kg (147 lb 0.8 oz)    Image Results  NM hepatobiliary  Narrative: Interpreted By:  Nessa Ayala and Meyers Emily   STUDY:  NM HEPATOBILIARY;  1/18/2024 9:33 am      INDICATION:  Signs/Symptoms:gallbladder thickening.      COMPARISON:  CT chest abdomen pelvis 01/14/2024, gallbladder ultrasound 01/14/2024      ACCESSION NUMBER(S):  [id number]      ORDERING CLINICIAN:  KIERRA RAY      TECHNIQUE:  DIVISION OF NUCLEAR MEDICINE  HEPATOBILIARY SCAN (HIDA)      The patient received an intravenous dose of  5.5 mCi of Tc-99m  mebrofenin (Choletec). A single static image was obtained at 9:05  a.m..      FINDINGS:  Upon initial attempts at imaging, IV was noted to be infiltrated.  Patient was then sent back to his room until a new IV line could be  placed. After successful placement of a new IV line, a single static  image was obtained prior to reinjection.      A single static image obtained at 9:05 a.m. demonstrates radiotracer  uptake within the liver, gallbladder, and small bowel.      Impression: 1. Limited examination secondary to technical difficulties with a  single static image approximately 90 minutes post-injection with  radiotracer uptake within the liver, gallbladder, and small bowel,  findings exclude acute cholecystitis.      I personally reviewed the images/study, and I agree with the findings  as stated above. This study was  interpreted at [facility], [place], [place].      MACRO:  None      Signed by: Nessa Ayala 1/18/2024 9:53 AM  Dictation workstation:   [id number]  NM injection no scan  These images are not reportable by radiology and will not be interpreted   by  Radiologists.      PHYSICAL EXAM  NEUROLOGICAL: No abnormal movements, no changes from before  HEENT: Head atraumatic, perrl,eomi, no oral lesions, no ear rash   NECK: Supple, no ln, jvp flat, thyroid palp  HEART: S1, S2, no added sound  LUNGS: CTAB, no crackles, no rales, no wheezing, no dullness to percussion, sym exp  EXTREMITIES: no edema  ABDOMEN: Soft, nontender, bowel sound positive, no organomegaly  SKIN: No change  EYES: No changes, perrl, eomi,   ENT: No change    JOINT: No change  Relevant Results               Assessment/Plan   This patient currently has cardiac telemetry ordered; if you would like to modify or discontinue the telemetry order, click here to go to the orders activity to modify/discontinue the order.        Esrd  Pvd  Nausea better      Principal Problem:    Septic shock (CMS/HCC)    t35              Mauricio Estrada MD

## 2024-01-18 NOTE — PROGRESS NOTES
"  INFECTIOUS DISEASE DAILY PROGRESS NOTE    SUBJECTIVE:    No new events. Afebrile. No new complaints.    OBJECTIVE:  VITALS (Last 24 Hours)  /81 (BP Location: Left arm)   Pulse 96   Temp 36.7 °C (98.1 °F) (Temporal)   Resp 10   Ht 1.753 m (5' 9\")   Wt 66.7 kg (147 lb 0.8 oz)   SpO2 95%   BMI 21.72 kg/m²     PHYSICAL EXAM:  Gen - NAD, in bed  Abd - mild distension, BS present  LLE - s/p AKA  Skin - no rash     ABX: IV Vanc    LABS:  Lab Results   Component Value Date    WBC 8.3 01/18/2024    HGB 8.1 (L) 01/18/2024    HCT 25.6 (L) 01/18/2024    MCV 83 01/18/2024     (L) 01/18/2024     Lab Results   Component Value Date    GLUCOSE 131 (H) 01/18/2024    CALCIUM 9.0 01/18/2024     01/18/2024    K 3.6 01/18/2024    CO2 24 01/18/2024    CL 94 (L) 01/18/2024    BUN 46 (H) 01/18/2024    CREATININE 6.97 (H) 01/18/2024     Results from last 72 hours   Lab Units 01/18/24  0503   ALK PHOS U/L 133*   BILIRUBIN TOTAL mg/dL 0.5   BILIRUBIN DIRECT mg/dL 0.2   PROTEIN TOTAL g/dL 6.4   ALT U/L 9*   AST U/L 12   ALBUMIN g/dL 2.7*  2.8*     Estimated Creatinine Clearance: 13.4 mL/min (A) (by C-G formula based on SCr of 6.97 mg/dL (H)).    ASSESSMENT/PLAN:     CLABSI (HD cath POA) due to Staph aureus - 3rd line infection in the last 3-4 months. HD cath removed 1/16. TTE limited but no endocarditis.  ESRD on HD     IV Vancomycin. I think we will need a POLI.    Repeat blood cx pending - NGTD. If these are clear 48-72H may be good for a tunneled catheter but he might need a temp HD cath if there is more urgent need for HD.     Will follow.    Mat Jauregui MD  [facility]  Office #[phone]      "

## 2024-01-18 NOTE — CONSULTS
Reason For Consult  nausea    History Of Present Illness  Edwar Hemphill is a 38 y.o. 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 GI  consulted for nausea. He is on IV antibiotics. Stated he has no nausea since he came from the test. Denies abd pain, indigestions, acid reflux, reports occasional odynophagia. He has h/o esophagitis and had EGD on 7/11/23 for hematemesis, see below. He also has h/o bleeding esophageal ulcer in 03/2022 and repeat EGD in 06/2022 noted with complete healing of the ulcer. He is on daily PPI at the nursing facility. He is being checked for C diff.     EGD 7/11/23 for hematemesis  - Normal hypopharynx.                         - Nasogastric tube was found ending in the stomach and                          was removed.                         - LA Grade C esophagitis with superficial ulceration                          and no bleeding was found at the GE junction. Possible                          healing Suma Marie tear.                         - Bilious gastric fluid.                         - Mild focal nasogastric tube trauma present in                          stomach.                         - Otherwise normal appearing stomach. No gastric                          ulcers, varices or AVMs. No stigmata of recent                          bleeding.                         - Normal duodenal bulb and second portion of the                          duodenum.                         - No specimens  collected.  Past Medical History  He has a past medical history of Essential (primary) hypertension (05/26/2017) and Personal history of other endocrine, nutritional and metabolic disease (05/26/2017).    Surgical History  He has a past surgical history that includes CT angio aorta and bilateral iliofemoral runoff w and or wo IV contrast (2/9/2023) and IR CVC exchange (11/13/2023).     Social History  He reports that he has been smoking cigarettes. He has a 3.75 pack-year smoking history. He has never used smokeless tobacco. No history on file for alcohol use and drug use.    Family History  Family History   Problem Relation Name Age of Onset    Other (DIABETES DUE TO UNDERLYING CONDITION WITH MICROALBUMINURIA) Father      Other (DIABETES DUE TO UNDERLYING CONDITION WITH MICROALBUMINURIA [Other]) Other AUNT     Other (DIABETES DUE TO UNDERLYING CONDITION WITH MICROALBUMINURIA [Other]) Other GP         Allergies  Cashew nut    Review of Systems  10 systems reviewed and negative other than HPI     Physical Exam  Physical Exam  Vitals reviewed.   Constitutional:       General: He is not in acute distress.     Appearance: He is ill-appearing. He is not toxic-appearing or diaphoretic.   HENT:      Head: Normocephalic and atraumatic.      Nose: Nose normal.      Mouth/Throat:      Mouth: Mucous membranes are moist.      Pharynx: Oropharynx is clear.   Eyes:      Conjunctiva/sclera: Conjunctivae normal.   Cardiovascular:      Rate and Rhythm: Normal rate and regular rhythm.      Heart sounds: Normal heart sounds.   Pulmonary:      Effort: Pulmonary effort is normal.      Breath sounds: Normal breath sounds.   Abdominal:      General: Bowel sounds are normal. There is no distension.      Palpations: Abdomen is soft. There is no mass.      Tenderness: There is no abdominal tenderness. There is no guarding or rebound.      Hernia: No hernia is present.   Genitourinary:     Comments: FMS with watery dark green  "stool  Musculoskeletal:      Cervical back: Neck supple.      Comments: L AKA, RUE amputation, R femoral HD catheter     Skin:     General: Skin is warm and dry.   Neurological:      General: No focal deficit present.      Mental Status: He is alert and oriented to person, place, and time.   Psychiatric:         Mood and Affect: Mood normal.         Behavior: Behavior normal.            Last Recorded Vitals  Blood pressure 143/81, pulse 100, temperature 36.7 °C (98.1 °F), temperature source Temporal, resp. rate 20, height 1.753 m (5' 9\"), weight 66.7 kg (147 lb 0.8 oz), SpO2 94 %.    Relevant Results  Scheduled medications  epoetin tyson or biosimilar, 10,000 Units, subcutaneous, Once per day on Mon Wed Fri  heparin (porcine), 5,000 Units, subcutaneous, q8h  insulin lispro, 0-10 Units, subcutaneous, TID with meals  levothyroxine, 125 mcg, oral, Daily  lidocaine, 5 mL, infiltration, Once  lidocaine PF, 10 mL, subcutaneous, Once  pantoprazole, 40 mg, oral, BID   Or  pantoprazole, 40 mg, intravenous, BID  perflutren lipid microspheres, 1 mL of dilution, intravenous, Once in imaging  sevelamer carbonate, 2,400 mg, oral, TID with meals  sodium bicarbonate 1 mEq in lidocaine-epinephrine (Xylocaine W/EPI) 1 %-1:100,000 9 mL Syringe, 10 mL, subcutaneous, Once      Continuous medications     PRN medications  PRN medications: acetaminophen, bisacodyl, dextrose 10 % in water (D10W), dextrose, glucagon, heparin flush, ondansetron ODT **OR** ondansetron, vancomycin  NM hepatobiliary    Result Date: 1/18/2024  1. Limited examination secondary to technical difficulties with a single static image approximately 90 minutes post-injection with radiotracer uptake within the liver, gallbladder, and small bowel, findings exclude acute cholecystitis.   I personally reviewed the images/study, and I agree with the findings as stated above. This study was interpreted at [facility], [place], [place].   MACRO: " None   Signed by: Nessa Ayala 1/18/2024 9:53 AM Dictation workstation:   [id number]    NM injection no scan    Result Date: 1/18/2024  These images are not reportable by radiology and will not be interpreted by  Radiologists.    XR abdomen 1 view    1. A nonspecific, nonobstructive bowel gas pattern.   MACRO: None   Signed by: Dean Stanton 1/17/2024 11:29 AM Dictation workstation:   [id number]    Transthoracic Echo (TTE) Complete    Result Date: 1/16/2024   [facility], [address] [place] [address]              Tel [phone] and Fax [phone] TRANSTHORACIC ECHOCARDIOGRAM REPORT  Patient Name:     XIOMARA Maya Physician:  [id number] Hernandez DO Study Date:       1/16/2024           Ordering Provider:  [id number] CHRIS MONTES MRN/PID:          [id number]            Fellow: Accession#:       [id number]        Nurse: Date of           1985 / 38      Sonographer:        Sonja Gonzalez RDCS Birth/Age:        years Gender:           M                   Additional Staff: Height:           175.00 cm           Admit Date:         1/14/2024 Weight:           72.00 kg            Admission Status:   Inpatient - Routine BSA:              1.87 m2             Encounter#:         [id number]                                       Department          [facility] ICU                                       Location: Blood Pressure: 110 /69 mmHg Study Type:    TRANSTHORACIC ECHO (TTE) COMPLETE Diagnosis/ICD: Bacteremia-R78.81 Indication:    Bacteremia Patient History: Pertinent History: HTN, Hyperlipidemia and PVD. Study Detail: The following Echo studies were performed: 2D, M-Mode, Doppler and               color flow. Image quality for this study is poor. Technically               challenging study due to body habitus and patient lying in supine               position. Definity used as a contrast agent for endocardial border                definition. Total contrast used for this procedure was 3 mL via IV               push.  PHYSICIAN INTERPRETATION: Left Ventricle: The left ventricular systolic function is normal, with an estimated ejection fraction of 65-70%. The calculated ejection fraction is normal at 68 % using the Briseno's Bi-plane MOD calculation. There are no regional wall motion abnormalities. The left ventricular cavity size is normal. The left ventricular septal wall thickness is normal. There is normal left ventricular posterior wall thickness. Spectral Doppler shows an impaired relaxation pattern of left ventricular diastolic filling. There is an elevated mean left atrial pressure. There is no definite left ventricular thrombus visualized. Left Atrium: The left atrium was not assessed. Right Ventricle: The right ventricle was not well visualized. There is normal right ventricular global systolic function. Right Atrium: The right atrium is normal in size. Aortic Valve: The aortic valve is trileaflet. There is mild aortic valve cusp calcification. Aortic valve regurgitation was not assessed. The peak instantaneous gradient of the aortic valve is 3.5 mmHg. Mitral Valve: The mitral valve is mildly thickened. Mitral valve regurgitation was not assessed. Tricuspid Valve: The tricuspid valve is structurally normal. There is trace tricuspid regurgitation. The Doppler estimated RVSP is within normal limits at 15.7 mmHg. Pulmonic Valve: The pulmonic valve is not well visualized. Pulmonic valve regurgitation was not assessed. Pericardium: Pericardial effusion was not assessed. Aorta: The aortic root was not assessed. Systemic Veins: The inferior vena cava size appears small. In comparison to the previous echocardiogram(s): Compared with study from 11/14/2023,. Again, poor echocardiographic imaging. Within the limitations of the study, valvular anatomy and function appear similar. There has not been progression of valvular regurgitation.  Study however is not of sufficient quality to fully exclude active endocarditis. Consider POLI based on clinical suspicion.  CONCLUSIONS:  1. Poorly visualized anatomical structures due to suboptimal image quality.  2. Left ventricular systolic function is normal with a 65-70% estimated ejection fraction.  3. Spectral Doppler shows an impaired relaxation pattern of left ventricular diastolic filling.  4. No left ventricular thrombus visualized.  5. There is an elevated mean left atrial pressure.  6. RVSP within normal limits.  7. Again, poor echocardiographic imaging. Within the limitations of the study, valvular anatomy and function appear similar. There has not been progression of valvular regurgitation. Study however is not of sufficient quality to fully exclude active endocarditis. Consider POLI based on clinical suspicion. RECOMMENDATIONS: Transthoracic echo has low negative predictive value for mass, vegetation, or embolic source. Consider POLI or MRI if clinically indicated.  QUANTITATIVE DATA SUMMARY: 2D MEASUREMENTS:                          Normal Ranges: IVSd:          0.90 cm   (0.6-1.1cm) LVPWd:         0.90 cm   (0.6-1.1cm) LVIDd:         4.20 cm   (3.9-5.9cm) LVIDs:         2.70 cm LV Mass Index: 63.4 g/m2 LV % FS        35.7 % LV SYSTOLIC FUNCTION BY 2D PLANIMETRY (MOD):                     Normal Ranges: EF-A4C View: 67.4 % (>=55%) EF-A2C View: 68.3 % EF-Biplane:  68.1 % LV DIASTOLIC FUNCTION:                               Normal Ranges: MV Peak E:        0.42 m/s    (0.7-1.2 m/s) MV Peak A:        0.61 m/s    (0.42-0.7 m/s) E/A Ratio:        0.69        (1.0-2.2) MV e'             0.03 m/s    (>8.0) MV lateral e'     0.06 m/s MV medial e'      0.03 m/s MV A Dur:         135.00 msec E/e' Ratio:       14.00       (<8.0) PulmV Sys Nilesh:    41.10 cm/s PulmV Munroe Nilesh:   18.60 cm/s PulmV S/D Nilesh:    2.20 PulmV A Revs Nilesh: 20.40 cm/s PulmV A Revs Dur: 103.00 msec MITRAL VALVE:                Normal Ranges: MV  DT: 79 msec (150-240msec) AORTIC VALVE:                        Normal Ranges: AoV Vmax:     0.94 m/s (<=1.7m/s) AoV Peak PG:  3.5 mmHg (<20mmHg) LVOT Max Nilesh: 0.63 m/s (<=1.1m/s)  RIGHT VENTRICLE: TAPSE: 17.4 mm TRICUSPID VALVE/RVSP:                             Normal Ranges: Peak TR Velocity: 1.78 m/s Est. RA Pressure: 3 mmHg RV Syst Pressure: 15.7 mmHg (< 30mmHg) IVC Diam:         0.84 cm Pulmonary Veins: PulmV A Revs Dur: 103.00 msec PulmV A Revs Nilesh: 20.40 cm/s PulmV Munroe Nilesh:   18.60 cm/s PulmV S/D Nilesh:    2.20 PulmV Sys Nilesh:    41.10 cm/s  [id number] Edward Hernandez DO Electronically signed on 1/16/2024 at 6:24:23 PM  ** Final **     Electrocardiogram, 12-lead PRN ACS symptoms    Result Date: 1/16/2024  Normal sinus rhythm Low voltage QRS Prolonged QT Abnormal ECG Confirmed by Gerardo Jimenez ([id number]) on 1/16/2024 11:43:42 AM    Electrocardiogram, 12-lead PRN ACS symptoms    Result Date: 1/15/2024  Sinus tachycardia with Fusion complexes Left axis deviation Septal infarct (cited on or before 14-JAN-2024) T wave abnormality, consider lateral ischemia Abnormal ECG When compared with ECG of 14-JAN-2024 13:12, (unconfirmed) Fusion complexes are now Present QRS duration has increased Serial changes of Septal infarct Present    US gallbladder    Result Date: 1/14/2024    Prominent gallbladder wall thickening measuring 10 mm in thickness and pericholecystic edema. There is however no definite evidence of gallstones and per the sonographer, sonographic Muñiz sign is absent. The gallbladder wall thickening is therefore nonspecific and may relate to underlying liver disease, hypoproteinemia or cardiac disease. Clinical correlation is recommended and if there is concern for acalculous cholecystitis, HIDA scan may be obtained for further evaluation.   MACRO: None   Signed by: Houston Conde 1/14/2024 7:32 PM Dictation workstation:   [id number]    CT chest abdomen pelvis wo IV contrast    Result Date: 1/14/2024    1.   Air in  vasculature in the chest, abdomen and pelvis as described above which may relate to vascular access, however clinical correlation is recommended if there is concern for other etiologies.   2. Moderate size partially loculated left pleural effusion and small right pleural effusion and bilateral atelectatic/consolidative opacities. New 2 cm x 0.9 cm subpleural opacity right lower lobe with mild calcifications.   3.  Right femoral central venous catheter which extends into the chest and terminates in the SVC.   4.  Lymphadenopathy in the imaged lower neck and mediastinal lymphadenopathy. Multiple prominent retroperitoneal lymph nodes are also noted and enlarged inguinal lymph nodes. The lymph nodes may be reactive in nature, however clinical correlation is recommended there is concern for malignancy or other etiology.   5.   Gallbladder wall thickening and pericholecystic edema is nonspecific and may relate to underlying liver disease, hypoproteinemia or cardiac disease, however if there is concern for acute cholecystitis/acute gallbladder pathology, right upper quadrant ultrasound is recommended for further evaluation.   6.   Low attenuation tubular opacity in the pancreatic tail measuring 5 mm in diameter may correspond to dilated pancreatic duct and may be better assessed on follow-up contrast enhanced imaging.   7.  Bilateral renal atrophy and multiple bilateral renal cysts and subcentimeter hypodensities too small to characterize. Limited evaluation for renal mass on noncontrast examination.   8.  Extensive atherosclerotic calcification in the chest, abdomen and pelvis.   9.  Underdistention versus urinary bladder wall thickening; please correlate urinalysis to evaluate for cystitis.   MACRO: None   Signed by: Houston Conde 1/14/2024 6:05 PM Dictation workstation:   [id number]    XR chest 2 views    Result Date: 1/14/2024    1. Small left pleural effusion with associated atelectasis and/or pneumonitis. 2. Right  basilar atelectasis and/or scarring.   MACRO: None   Signed by: Jarvis Holcomb 1/14/2024 2:51 PM Dictation workstation:   [id number]       Results for orders placed or performed during the hospital encounter of 01/14/24 (from the past 24 hour(s))   POCT GLUCOSE   Result Value Ref Range    POCT Glucose 200 (H) 74 - 99 mg/dL   POCT GLUCOSE   Result Value Ref Range    POCT Glucose 141 (H) 74 - 99 mg/dL   Renal Function Panel   Result Value Ref Range    Glucose 131 (H) 74 - 99 mg/dL    Sodium 136 136 - 145 mmol/L    Potassium 3.6 3.5 - 5.3 mmol/L    Chloride 94 (L) 98 - 107 mmol/L    Bicarbonate 24 21 - 32 mmol/L    Anion Gap 22 (H) 10 - 20 mmol/L    Urea Nitrogen 46 (H) 6 - 23 mg/dL    Creatinine 6.97 (H) 0.50 - 1.30 mg/dL    eGFR 10 (L) >60 mL/min/1.73m*2    Calcium 9.0 8.6 - 10.3 mg/dL    Phosphorus 5.0 (H) 2.5 - 4.9 mg/dL    Albumin 2.8 (L) 3.4 - 5.0 g/dL   CBC   Result Value Ref Range    WBC 8.3 4.4 - 11.3 x10*3/uL    nRBC 0.0 0.0 - 0.0 /100 WBCs    RBC 3.08 (L) 4.50 - 5.90 x10*6/uL    Hemoglobin 8.1 (L) 13.5 - 17.5 g/dL    Hematocrit 25.6 (L) 41.0 - 52.0 %    MCV 83 80 - 100 fL    MCH 26.3 26.0 - 34.0 pg    MCHC 31.6 (L) 32.0 - 36.0 g/dL    RDW 21.2 (H) 11.5 - 14.5 %    Platelets 141 (L) 150 - 450 x10*3/uL   Hepatic function panel   Result Value Ref Range    Albumin 2.7 (L) 3.4 - 5.0 g/dL    Bilirubin, Total 0.5 0.0 - 1.2 mg/dL    Bilirubin, Direct 0.2 0.0 - 0.3 mg/dL    Alkaline Phosphatase 133 (H) 33 - 120 U/L    ALT 9 (L) 10 - 52 U/L    AST 12 9 - 39 U/L    Total Protein 6.4 6.4 - 8.2 g/dL   POCT GLUCOSE   Result Value Ref Range    POCT Glucose 197 (H) 74 - 99 mg/dL   POCT GLUCOSE   Result Value Ref Range    POCT Glucose 170 (H) 74 - 99 mg/dL            Assessment/Plan     36 yo M with h/o ESRD on HD, DM, HTN, R arm amputation, 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 GI  consulted for nausea, suspect multifactorial due to sepsis, medications, possibly chronic esophagitis, gastritis. HIDA was negative for acute cholecystitis.    - antibiotics per ID  - supportive care with antiemetics as needed  - recommend twice daily PPI  - diet as tolerated  - elevate head of the bed with all meals  - await stool studies    I spent 45 minutes in the professional and overall care of this patient.      Neida Haley, AMARILYS-CNP

## 2024-01-18 NOTE — CONSULTS
Inpatient consult to Endocrinology  Consult performed by: Mike Santana MD  Consult ordered by: Mauricio Estrada MD      Patient unavailable, at testing.  I will return tomorrow.

## 2024-01-19 ENCOUNTER — ANESTHESIA EVENT (OUTPATIENT)
Dept: CARDIOLOGY | Facility: HOSPITAL | Age: 39
DRG: 314 | End: 2024-01-19
Payer: COMMERCIAL

## 2024-01-19 ENCOUNTER — APPOINTMENT (OUTPATIENT)
Dept: CARDIOLOGY | Facility: HOSPITAL | Age: 39
DRG: 314 | End: 2024-01-19
Payer: COMMERCIAL

## 2024-01-19 ENCOUNTER — ANESTHESIA (OUTPATIENT)
Dept: CARDIOLOGY | Facility: HOSPITAL | Age: 39
DRG: 314 | End: 2024-01-19
Payer: COMMERCIAL

## 2024-01-19 LAB
ALBUMIN SERPL BCP-MCNC: 2.5 G/DL (ref 3.4–5)
ANION GAP SERPL CALC-SCNC: 27 MMOL/L (ref 10–20)
BUN SERPL-MCNC: 52 MG/DL (ref 6–23)
CALCIUM SERPL-MCNC: 8.5 MG/DL (ref 8.6–10.3)
CHLORIDE SERPL-SCNC: 93 MMOL/L (ref 98–107)
CO2 SERPL-SCNC: 20 MMOL/L (ref 21–32)
CREAT SERPL-MCNC: 8.95 MG/DL (ref 0.5–1.3)
EGFRCR SERPLBLD CKD-EPI 2021: 7 ML/MIN/1.73M*2
ERYTHROCYTE [DISTWIDTH] IN BLOOD BY AUTOMATED COUNT: 20.4 % (ref 11.5–14.5)
GLUCOSE BLD MANUAL STRIP-MCNC: 332 MG/DL (ref 74–99)
GLUCOSE BLD MANUAL STRIP-MCNC: 351 MG/DL (ref 74–99)
GLUCOSE BLD MANUAL STRIP-MCNC: 353 MG/DL (ref 74–99)
GLUCOSE SERPL-MCNC: 252 MG/DL (ref 74–99)
HCT VFR BLD AUTO: 23.4 % (ref 41–52)
HGB BLD-MCNC: 7.6 G/DL (ref 13.5–17.5)
MCH RBC QN AUTO: 26 PG (ref 26–34)
MCHC RBC AUTO-ENTMCNC: 32.5 G/DL (ref 32–36)
MCV RBC AUTO: 80 FL (ref 80–100)
NRBC BLD-RTO: 0 /100 WBCS (ref 0–0)
PHOSPHATE SERPL-MCNC: 4.8 MG/DL (ref 2.5–4.9)
PLATELET # BLD AUTO: 134 X10*3/UL (ref 150–450)
POTASSIUM SERPL-SCNC: 3.8 MMOL/L (ref 3.5–5.3)
RBC # BLD AUTO: 2.92 X10*6/UL (ref 4.5–5.9)
SODIUM SERPL-SCNC: 136 MMOL/L (ref 136–145)
VANCOMYCIN SERPL-MCNC: 21.5 UG/ML (ref 5–20)
WBC # BLD AUTO: 8 X10*3/UL (ref 4.4–11.3)

## 2024-01-19 PROCEDURE — B24BZZ4 ULTRASONOGRAPHY OF HEART WITH AORTA, TRANSESOPHAGEAL: ICD-10-PCS | Performed by: NURSE PRACTITIONER

## 2024-01-19 PROCEDURE — 93325 DOPPLER ECHO COLOR FLOW MAPG: CPT | Performed by: INTERNAL MEDICINE

## 2024-01-19 PROCEDURE — B24CZZ4 ULTRASONOGRAPHY OF PERICARDIUM, TRANSESOPHAGEAL: ICD-10-PCS | Performed by: NURSE PRACTITIONER

## 2024-01-19 PROCEDURE — 2500000004 HC RX 250 GENERAL PHARMACY W/ HCPCS (ALT 636 FOR OP/ED): Performed by: NURSE PRACTITIONER

## 2024-01-19 PROCEDURE — 2500000004 HC RX 250 GENERAL PHARMACY W/ HCPCS (ALT 636 FOR OP/ED)

## 2024-01-19 PROCEDURE — 3700000001 HC GENERAL ANESTHESIA TIME - INITIAL BASE CHARGE

## 2024-01-19 PROCEDURE — 99152 MOD SED SAME PHYS/QHP 5/>YRS: CPT

## 2024-01-19 PROCEDURE — 36000 PLACE NEEDLE IN VEIN: CPT | Performed by: RADIOLOGY

## 2024-01-19 PROCEDURE — 2500000005 HC RX 250 GENERAL PHARMACY W/O HCPCS: Performed by: RADIOLOGY

## 2024-01-19 PROCEDURE — 93005 ELECTROCARDIOGRAM TRACING: CPT

## 2024-01-19 PROCEDURE — 85027 COMPLETE CBC AUTOMATED: CPT | Performed by: NURSE PRACTITIONER

## 2024-01-19 PROCEDURE — A93312 PR ECHO HEART,TRANSESOPHAGEAL,COMPLETE

## 2024-01-19 PROCEDURE — C1894 INTRO/SHEATH, NON-LASER: HCPCS

## 2024-01-19 PROCEDURE — 77001 FLUOROGUIDE FOR VEIN DEVICE: CPT | Performed by: RADIOLOGY

## 2024-01-19 PROCEDURE — 80069 RENAL FUNCTION PANEL: CPT | Performed by: NURSE PRACTITIONER

## 2024-01-19 PROCEDURE — 3700000002 HC GENERAL ANESTHESIA TIME - EACH INCREMENTAL 1 MINUTE

## 2024-01-19 PROCEDURE — 6350000001 HC RX 635 EPOETIN >10,000 UNITS: Mod: JZ | Performed by: NURSE PRACTITIONER

## 2024-01-19 PROCEDURE — C1752 CATH,HEMODIALYSIS,SHORT-TERM: HCPCS

## 2024-01-19 PROCEDURE — 2500000005 HC RX 250 GENERAL PHARMACY W/O HCPCS: Performed by: NURSE PRACTITIONER

## 2024-01-19 PROCEDURE — 76937 US GUIDE VASCULAR ACCESS: CPT | Performed by: RADIOLOGY

## 2024-01-19 PROCEDURE — 99232 SBSQ HOSP IP/OBS MODERATE 35: CPT | Performed by: NURSE PRACTITIONER

## 2024-01-19 PROCEDURE — C9113 INJ PANTOPRAZOLE SODIUM, VIA: HCPCS | Performed by: NURSE PRACTITIONER

## 2024-01-19 PROCEDURE — 93320 DOPPLER ECHO COMPLETE: CPT | Performed by: INTERNAL MEDICINE

## 2024-01-19 PROCEDURE — 99153 MOD SED SAME PHYS/QHP EA: CPT

## 2024-01-19 PROCEDURE — 2720000007 HC OR 272 NO HCPCS

## 2024-01-19 PROCEDURE — C1769 GUIDE WIRE: HCPCS

## 2024-01-19 PROCEDURE — 2550000001 HC RX 255 CONTRASTS: Performed by: RADIOLOGY

## 2024-01-19 PROCEDURE — A93312 PR ECHO HEART,TRANSESOPHAGEAL,COMPLETE: Performed by: ANESTHESIOLOGY

## 2024-01-19 PROCEDURE — 99153 MOD SED SAME PHYS/QHP EA: CPT | Performed by: RADIOLOGY

## 2024-01-19 PROCEDURE — 99152 MOD SED SAME PHYS/QHP 5/>YRS: CPT | Performed by: RADIOLOGY

## 2024-01-19 PROCEDURE — 93320 DOPPLER ECHO COMPLETE: CPT

## 2024-01-19 PROCEDURE — 77001 FLUOROGUIDE FOR VEIN DEVICE: CPT

## 2024-01-19 PROCEDURE — 2500000002 HC RX 250 W HCPCS SELF ADMINISTERED DRUGS (ALT 637 FOR MEDICARE OP, ALT 636 FOR OP/ED): Performed by: NURSE PRACTITIONER

## 2024-01-19 PROCEDURE — 36556 INSERT NON-TUNNEL CV CATH: CPT

## 2024-01-19 PROCEDURE — 7100000010 HC PHASE TWO TIME - EACH INCREMENTAL 1 MINUTE

## 2024-01-19 PROCEDURE — 7100000009 HC PHASE TWO TIME - INITIAL BASE CHARGE

## 2024-01-19 PROCEDURE — 1100000001 HC PRIVATE ROOM DAILY

## 2024-01-19 PROCEDURE — 0JH63XZ INSERTION OF TUNNELED VASCULAR ACCESS DEVICE INTO CHEST SUBCUTANEOUS TISSUE AND FASCIA, PERCUTANEOUS APPROACH: ICD-10-PCS | Performed by: RADIOLOGY

## 2024-01-19 PROCEDURE — 36415 COLL VENOUS BLD VENIPUNCTURE: CPT | Performed by: NURSE PRACTITIONER

## 2024-01-19 PROCEDURE — 76937 US GUIDE VASCULAR ACCESS: CPT

## 2024-01-19 PROCEDURE — 82947 ASSAY GLUCOSE BLOOD QUANT: CPT

## 2024-01-19 PROCEDURE — 93312 ECHO TRANSESOPHAGEAL: CPT | Performed by: INTERNAL MEDICINE

## 2024-01-19 PROCEDURE — 2500000001 HC RX 250 WO HCPCS SELF ADMINISTERED DRUGS (ALT 637 FOR MEDICARE OP): Performed by: NURSE PRACTITIONER

## 2024-01-19 PROCEDURE — 2500000004 HC RX 250 GENERAL PHARMACY W/ HCPCS (ALT 636 FOR OP/ED): Performed by: RADIOLOGY

## 2024-01-19 PROCEDURE — 80202 ASSAY OF VANCOMYCIN: CPT | Performed by: NURSE PRACTITIONER

## 2024-01-19 PROCEDURE — 02HV33Z INSERTION OF INFUSION DEVICE INTO SUPERIOR VENA CAVA, PERCUTANEOUS APPROACH: ICD-10-PCS | Performed by: RADIOLOGY

## 2024-01-19 PROCEDURE — 2780000003 HC OR 278 NO HCPCS

## 2024-01-19 PROCEDURE — 99221 1ST HOSP IP/OBS SF/LOW 40: CPT | Performed by: INTERNAL MEDICINE

## 2024-01-19 PROCEDURE — 36556 INSERT NON-TUNNEL CV CATH: CPT | Performed by: RADIOLOGY

## 2024-01-19 RX ORDER — PROPOFOL 10 MG/ML
INJECTION, EMULSION INTRAVENOUS AS NEEDED
Status: DISCONTINUED | OUTPATIENT
Start: 2024-01-19 | End: 2024-01-19

## 2024-01-19 RX ORDER — PHENYLEPHRINE HCL IN 0.9% NACL 0.4MG/10ML
SYRINGE (ML) INTRAVENOUS AS NEEDED
Status: DISCONTINUED | OUTPATIENT
Start: 2024-01-19 | End: 2024-01-19

## 2024-01-19 RX ORDER — SODIUM CHLORIDE 9 MG/ML
50 INJECTION, SOLUTION INTRAVENOUS CONTINUOUS
Status: DISCONTINUED | OUTPATIENT
Start: 2024-01-19 | End: 2024-01-19

## 2024-01-19 RX ORDER — LIDOCAINE HYDROCHLORIDE 20 MG/ML
INJECTION, SOLUTION INFILTRATION; PERINEURAL AS NEEDED
Status: DISCONTINUED | OUTPATIENT
Start: 2024-01-19 | End: 2024-01-23 | Stop reason: HOSPADM

## 2024-01-19 RX ORDER — LIDOCAINE HYDROCHLORIDE 20 MG/ML
15 SOLUTION OROPHARYNGEAL ONCE
Status: COMPLETED | OUTPATIENT
Start: 2024-01-19 | End: 2024-01-19

## 2024-01-19 RX ORDER — FENTANYL CITRATE 50 UG/ML
INJECTION, SOLUTION INTRAMUSCULAR; INTRAVENOUS AS NEEDED
Status: DISCONTINUED | OUTPATIENT
Start: 2024-01-19 | End: 2024-01-23 | Stop reason: HOSPADM

## 2024-01-19 RX ORDER — INSULIN GLARGINE 100 [IU]/ML
4 INJECTION, SOLUTION SUBCUTANEOUS NIGHTLY
Status: DISCONTINUED | OUTPATIENT
Start: 2024-01-19 | End: 2024-01-20

## 2024-01-19 RX ORDER — MIDAZOLAM HYDROCHLORIDE 1 MG/ML
INJECTION, SOLUTION INTRAMUSCULAR; INTRAVENOUS AS NEEDED
Status: DISCONTINUED | OUTPATIENT
Start: 2024-01-19 | End: 2024-01-23 | Stop reason: HOSPADM

## 2024-01-19 RX ORDER — DEXTROSE 50 % IN WATER (D50W) INTRAVENOUS SYRINGE
25
Status: DISCONTINUED | OUTPATIENT
Start: 2024-01-19 | End: 2024-01-23 | Stop reason: HOSPADM

## 2024-01-19 RX ORDER — PROPOFOL 10 MG/ML
INJECTION, EMULSION INTRAVENOUS CONTINUOUS PRN
Status: DISCONTINUED | OUTPATIENT
Start: 2024-01-19 | End: 2024-01-19

## 2024-01-19 RX ADMIN — MIDAZOLAM HYDROCHLORIDE 1 MG: 1 INJECTION INTRAMUSCULAR; INTRAVENOUS at 12:00

## 2024-01-19 RX ADMIN — PROPOFOL 40 MG: 10 INJECTION, EMULSION INTRAVENOUS at 13:15

## 2024-01-19 RX ADMIN — Medication 200 MCG: at 13:37

## 2024-01-19 RX ADMIN — FENTANYL CITRATE 50 MCG: 50 INJECTION, SOLUTION INTRAMUSCULAR; INTRAVENOUS at 12:00

## 2024-01-19 RX ADMIN — PANTOPRAZOLE SODIUM 40 MG: 40 INJECTION, POWDER, FOR SOLUTION INTRAVENOUS at 20:14

## 2024-01-19 RX ADMIN — INSULIN GLARGINE 4 UNITS: 100 INJECTION, SOLUTION SUBCUTANEOUS at 20:53

## 2024-01-19 RX ADMIN — IOHEXOL 10 ML: 350 INJECTION, SOLUTION INTRAVENOUS at 12:30

## 2024-01-19 RX ADMIN — HEPARIN SODIUM 5000 UNITS: 5000 INJECTION INTRAVENOUS; SUBCUTANEOUS at 21:53

## 2024-01-19 RX ADMIN — PROPOFOL 30 MG: 10 INJECTION, EMULSION INTRAVENOUS at 13:22

## 2024-01-19 RX ADMIN — LIDOCAINE HYDROCHLORIDE 10 ML: 20 INJECTION, SOLUTION INFILTRATION; PERINEURAL at 12:06

## 2024-01-19 RX ADMIN — PROPOFOL 150 MCG/KG/MIN: 10 INJECTION, EMULSION INTRAVENOUS at 13:15

## 2024-01-19 RX ADMIN — INSULIN LISPRO 10 UNITS: 100 INJECTION, SOLUTION INTRAVENOUS; SUBCUTANEOUS at 16:58

## 2024-01-19 RX ADMIN — BENZOCAINE, BUTAMBEN, AND TETRACAINE HYDROCHLORIDE 2 SPRAY: .028; .004; .004 AEROSOL, SPRAY TOPICAL at 13:15

## 2024-01-19 RX ADMIN — SODIUM CHLORIDE: 9 INJECTION, SOLUTION INTRAVENOUS at 13:00

## 2024-01-19 RX ADMIN — SEVELAMER CARBONATE 2400 MG: 800 TABLET, FILM COATED ORAL at 16:57

## 2024-01-19 RX ADMIN — LIDOCAINE HYDROCHLORIDE 15 ML: 20 SOLUTION ORAL; TOPICAL at 13:15

## 2024-01-19 RX ADMIN — EPOETIN ALFA-EPBX 10000 UNITS: 10000 INJECTION, SOLUTION INTRAVENOUS; SUBCUTANEOUS at 17:01

## 2024-01-19 ASSESSMENT — COGNITIVE AND FUNCTIONAL STATUS - GENERAL
STANDING UP FROM CHAIR USING ARMS: TOTAL
MOBILITY SCORE: 8
DAILY ACTIVITIY SCORE: 12
MOVING TO AND FROM BED TO CHAIR: TOTAL
HELP NEEDED FOR BATHING: A LOT
TOILETING: TOTAL
MOVING FROM LYING ON BACK TO SITTING ON SIDE OF FLAT BED WITH BEDRAILS: A LOT
WALKING IN HOSPITAL ROOM: TOTAL
DRESSING REGULAR UPPER BODY CLOTHING: A LOT
DRESSING REGULAR LOWER BODY CLOTHING: TOTAL
PERSONAL GROOMING: A LOT
CLIMB 3 TO 5 STEPS WITH RAILING: TOTAL
TURNING FROM BACK TO SIDE WHILE IN FLAT BAD: A LOT

## 2024-01-19 ASSESSMENT — PAIN - FUNCTIONAL ASSESSMENT
PAIN_FUNCTIONAL_ASSESSMENT: 0-10

## 2024-01-19 ASSESSMENT — PAIN SCALES - GENERAL
PAINLEVEL_OUTOF10: 0 - NO PAIN

## 2024-01-19 ASSESSMENT — ENCOUNTER SYMPTOMS
NEUROLOGICAL NEGATIVE: 1
MUSCULOSKELETAL NEGATIVE: 1
ENDOCRINE NEGATIVE: 1
CARDIOVASCULAR NEGATIVE: 1
FEVER: 1
HEMATOLOGIC/LYMPHATIC NEGATIVE: 1
CONSTITUTIONAL NEGATIVE: 1
PSYCHIATRIC NEGATIVE: 1
EYES NEGATIVE: 1
ALLERGIC/IMMUNOLOGIC NEGATIVE: 1
RESPIRATORY NEGATIVE: 1
GASTROINTESTINAL NEGATIVE: 1
ENDOCRINE COMMENTS: AS ABOVE
SHORTNESS OF BREATH: 0

## 2024-01-19 NOTE — ANESTHESIA POSTPROCEDURE EVALUATION
Patient: Edwar Hemphill    Procedure Summary       Date: 01/19/24 Room / Location: [facility]; [facility]    Anesthesia Start: 1304 Anesthesia Stop: 1350    Procedure: TRANSESOPHAGEAL ECHO (POLI) Diagnosis:       Bacteremia      (bacteremia, rule out endocarditis)    Scheduled Providers: Luigi Bach MD Responsible Provider: Osbaldo Maldonado MD    Anesthesia Type: general ASA Status: 3            Anesthesia Type: general    Vitals Value Taken Time   /68 01/19/24 1442             Resp 18 01/19/24 1442   SpO2 98 % 01/19/24 1442       Anesthesia Post Evaluation    Patient location during evaluation: PACU  Patient participation: complete - patient participated  Level of consciousness: awake and alert  Pain management: adequate  Airway patency: patent  Cardiovascular status: acceptable and hemodynamically stable  Respiratory status: acceptable, spontaneous ventilation and nonlabored ventilation  Hydration status: acceptable  Postoperative Nausea and Vomiting: none        There were no known notable events for this encounter.

## 2024-01-19 NOTE — POST-PROCEDURE NOTE
Interventional Radiology Brief Postprocedure Note    Attending: Ke    Assistant: None    Diagnosis: ESRD    Description of procedure: L groin temp HD catheter, ready for use.     Inferior venacavagram showing multifocal IVC stenosis, including at inferior cavoatrial junction.    Anesthesia:  Local  1 mg Versed, 50 mcg Fentanyl    Complications: None    Estimated Blood Loss: minimal          See detailed result report with images in PACS.    The patient tolerated the procedure well without incident or complication and is in stable condition.

## 2024-01-19 NOTE — H&P
History Of Present Illness  Edwar Hemphill is a 38 y.o. male presenting with CLABSI d/t HD cath. PMH includes ESRD on HD, HTN, DM, s/p right arm amputation and left leg amputation.     Past Medical History  He has a past medical history of Chronic kidney disease, Diabetes mellitus (CMS/HCC), ESRD (end stage renal disease) (CMS/[facility]), Essential (primary) hypertension (05/26/2017), GERD (gastroesophageal reflux disease), Hyperlipidemia, and Hypothyroidism.    Surgical History  He has a past surgical history that includes CT angio aorta and bilateral iliofemoral runoff w and or wo IV contrast (02/09/2023); IR CVC exchange (11/13/2023); Toe amputation (Left, 02/17/2023); Leg amputation, lower tibia/fibula (Left, 07/05/2023); Leg amputation through knee (Left, 07/08/2023); Wound debridement (Left, 07/26/2023); Wound debridement (Left, 08/02/2023); Arm amputation at elbow (Right, 05/2021); and AV fistula placement w/ PTFE.     Social History  He reports that he has been smoking cigarettes. He has a 3.75 pack-year smoking history. He has never used smokeless tobacco. No history on file for alcohol use and drug use.    Family History  Family History   Problem Relation Name Age of Onset    Other (DIABETES DUE TO UNDERLYING CONDITION WITH MICROALBUMINURIA) Father      Other (DIABETES DUE TO UNDERLYING CONDITION WITH MICROALBUMINURIA [Other]) Other AUNT     Other (DIABETES DUE TO UNDERLYING CONDITION WITH MICROALBUMINURIA [Other]) Other GP         Allergies  Cashew nut    Home Medications  Current Facility-Administered Medications on File Prior to Visit   Medication Dose Route Frequency Provider Last Rate Last Admin    acetaminophen (Tylenol) tablet 650 mg  650 mg oral q6h PRN OSBALDO Sue   650 mg at 01/15/24 2058    B complex-vitamin C-folic acid (Nephrocaps) capsule 1 capsule  1 capsule oral Daily OSBALDO Sue        bisacodyl (Dulcolax) EC tablet 5 mg  5 mg oral Daily PRN OSBALDO Sue         dextrose 10 % in water (D10W) infusion  0.3 g/kg/hr intravenous Once PRN Jessica Blanco, APRN-CNP        dextrose 50 % injection 25 g  25 g intravenous q15 min PRN Mike Santana MD        epoetin tyson-epbx (Retacrit) injection 10,000 Units  10,000 Units subcutaneous Once per day on Mon Wed Fri Jessica Blanco APRN-CNP   10,000 Units at 01/17/24 1841    glucagon (Glucagen) injection 1 mg  1 mg intramuscular q15 min PRN Jessica Blanco, APRN-CNP        heparin (porcine) injection 5,000 Units  5,000 Units subcutaneous q8h Jessica Blanco APRN-CNP   5,000 Units at 01/18/24 1423    heparin flush 10 unit/mL syringe 10 Units  10 Units intra-catheter PRN Jessica Blanco APRN-CNP   10 Units at 01/16/24 0353    insulin glargine (Lantus) injection 4 Units  4 Units subcutaneous Nightly Mike Santana MD        insulin lispro (HumaLOG) injection 0-10 Units  0-10 Units subcutaneous TID with meals Jessica Blanco APRN-CNP   2 Units at 01/18/24 1221    levothyroxine (Synthroid, Levoxyl) tablet 125 mcg  125 mcg oral Daily Jessica Blanco APRN-CNP   125 mcg at 01/17/24 0535    lidocaine (Xylocaine) 10 mg/mL (1 %) injection 50 mg  5 mL infiltration Once Jessica Blanco, APRN-CNP        lidocaine PF (Xylocaine) 20 mg/mL (2 %) injection 200 mg  10 mL subcutaneous Once Jessica Blanco, APRN-MARGUERITE        ondansetron ODT (Zofran-ODT) disintegrating tablet 4 mg  4 mg oral q8h PRN Jessica Blanco APRN-CNP        Or    ondansetron (Zofran) injection 4 mg  4 mg intravenous q8h PRN Jessica Blanco APRN-CNP   4 mg at 01/18/24 0907    pantoprazole (ProtoNix) EC tablet 40 mg  40 mg oral BID Jessica Blanco APRN-CNP        Or    pantoprazole (ProtoNix) injection 40 mg  40 mg intravenous BID Jessica Blanco APRN-CNP   40 mg at 01/18/24 2037    perflutren lipid microspheres (Definity) injection 1 mL of dilution  1 mL of dilution intravenous Once in imaging Jessica Blanco, APRN-CNP        sevelamer carbonate (Renvela) tablet 2,400 mg  2,400 mg  oral TID with meals OSBALDO Sue   2,400 mg at 01/18/24 1222    sodium bicarbonate 1 mEq in lidocaine-epinephrine (Xylocaine W/EPI) 1 %-1:100,000 9 mL Syringe  10 mL subcutaneous Once OSBALDO Sue        vancomycin (Vancocin) placeholder   miscellaneous Daily PRN OSBALDO Sue        [DISCONTINUED] dextrose 50 % injection 25 g  25 g intravenous q15 min PRN OSBALDO Sue         Current Outpatient Medications on File Prior to Visit   Medication Sig Dispense Refill    acetaminophen (Tylenol) 325 mg tablet Take 2 tablets (650 mg) by mouth every 4 hours if needed for moderate pain (4 - 6). 30 tablet 0    B complex-vitamin C-folic acid (Nephrocaps) 1 mg capsule Take 1 capsule by mouth once daily.      bisacodyl (Dulcolax, bisacodyl,) 10 mg suppository Insert into the rectum once daily as needed for constipation.      insulin glargine (Lantus) 100 unit/mL injection Inject 10 Units under the skin once daily at bedtime. Take as directed per insulin instructions. 3 mL 11    insulin lispro (HumaLOG) 100 unit/mL injection Inject 0-0.15 mL (0-15 Units) under the skin 3 times a day with meals. Take as directed per insulin instructions. Do not start before November 17, 2023. (Patient taking differently: Inject under the skin 3 times a day with meals. Using a Sliding Scale) 13.5 mL 11    levothyroxine (Synthroid, Levoxyl) 125 mcg tablet Take 1 tablet (125 mcg) by mouth once daily in the morning. Take before meals.      loperamide (Imodium A-D) 2 mg tablet Take 2 tablets (4 mg) by mouth every 2 hours if needed for diarrhea.      melatonin 3 mg tablet Take 1 tablet (3 mg) by mouth as needed at bedtime for sleep for up to 15 doses. 15 tablet 0    oxyCODONE-acetaminophen (Percocet) 5-325 mg tablet Take 1 tablet by mouth every 6 hours if needed for severe pain (7 - 10). 10 tablet 0    pantoprazole (ProtoNix) 40 mg EC tablet Take 1 tablet (40 mg) by mouth once daily in the morning. Take  before meals. Do not crush, chew, or split. Do not start before November 17, 2023. 30 tablet 11    sevelamer carbonate (Renvela) 800 mg tablet Take 3 tablets (2,400 mg) by mouth 3 times a day with meals.      simethicone (Mylicon) 80 mg chewable tablet Chew 1 tablet (80 mg) every 8 hours if needed for flatulence.            Inpatient Medications:            Review of Systems   Constitutional: Negative.    HENT: Negative.     Eyes: Negative.    Respiratory: Negative.     Cardiovascular: Negative.    Gastrointestinal: Negative.    Endocrine: Negative.    Genitourinary: Negative.    Musculoskeletal: Negative.    Skin: Negative.    Allergic/Immunologic: Negative.    Neurological: Negative.    Hematological: Negative.    Psychiatric/Behavioral: Negative.            Physical Exam  Constitutional:       General: He is awake. He is not in acute distress.     Appearance: He is not ill-appearing or toxic-appearing.   HENT:      Nose: Nose normal.      Mouth/Throat:      Mouth: Mucous membranes are moist.      Pharynx: Oropharynx is clear.   Eyes:      Extraocular Movements: Extraocular movements intact.      Conjunctiva/sclera: Conjunctivae normal.   Cardiovascular:      Rate and Rhythm: Normal rate and regular rhythm.      Pulses:           Radial pulses are 2+ on the left side.        Femoral pulses are 1+ on the left side.       Dorsalis pedis pulses are 1+ on the right side.      Heart sounds: Normal heart sounds. No murmur heard.     Comments: S/p above the elbow amputation right upper extremity  Pulmonary:      Effort: Pulmonary effort is normal.      Breath sounds: Normal breath sounds and air entry.   Abdominal:      General: Bowel sounds are normal. There is no distension.      Palpations: Abdomen is soft.      Tenderness: There is no abdominal tenderness.   Musculoskeletal:      Cervical back: Normal range of motion and neck supple.      Right lower leg: No edema.      Left lower leg: No edema.      Left Lower  Extremity: Left leg is amputated above knee.   Skin:     General: Skin is warm and dry.   Neurological:      General: No focal deficit present.      Mental Status: He is alert and oriented to person, place, and time. Mental status is at baseline.      GCS: GCS eye subscore is 4. GCS verbal subscore is 5. GCS motor subscore is 6.   Psychiatric:         Mood and Affect: Mood normal.         Behavior: Behavior normal. Behavior is cooperative.         Thought Content: Thought content normal.         Judgment: Judgment normal.        Sedation Plan    ASA 4     Mallampati class: III.         Last Recorded Vitals  There were no vitals taken for this visit.    Relevant Results    Labs    CBC:   Recent Labs     01/19/24  0536 01/18/24  0503 01/17/24  0541 01/16/24  0355 01/15/24  0632 01/14/24  1319   WBC 8.0 8.3 9.4 7.4 8.1 10.7   HGB 7.6* 8.1* 7.9* 9.1* 10.0* 10.7*   HCT 23.4* 25.6* 24.3* 29.6* 32.5* 35.2*   * 141* 115* 124* 136* 190   MCV 80 83 81 88 86 86     BMP/CMP:   Recent Labs     01/19/24  0536 01/18/24  0503 01/17/24  0541 01/16/24  0355 01/15/24  0632 01/14/24  1319 11/11/23  0631 11/10/23  1207 09/13/23  1618 09/13/23  1351 08/24/23  0614 08/22/23  0822 08/21/23  1428    136 133* 135* 130* 131*   < > 133*   < > 136   < > 137 134*   K 3.8 3.6 5.0 4.0 4.4 4.4   < > 5.3   < > 7.0*   < > 4.6 4.6   CL 93* 94* 89* 95* 95* 93*   < > 92*   < > 106   < > 103 99   BUN 52* 46* 41* 19 30* 27*   < > 47*   < > 78*   < > 25* 23   CREATININE 8.95* 6.97* 6.42* 5.24* 8.25* 7.74*   < > 7.92*   < > 10.24*   < > 5.04* 4.55*   CO2 20* 24 20* 17* 22 25   < > 24   < > 17*   < > 24 25   CALCIUM 8.5* 9.0 8.5* 8.8 8.8 9.8   < > 8.8   < > 8.5*   < > 6.4* 6.6*   PROT  --  6.4  --   --   --  8.3*  --  7.1  --  6.6  --  4.7* 4.6*   BILITOT  --  0.5  --   --   --  0.6  --  0.7  --  0.5  --  0.5 0.6   ALKPHOS  --  133*  --   --   --  183*  --  401*  --  228*  --  207* 202*   ALT  --  9*  --   --   --  10  --  10  --  <3*  --  8* 10  "  AST  --  12  --   --   --  13  --  10  --  13  --  16 27   GLUCOSE 252* 131* 295* 170* 143* 85   < > 131*   < > 46*   < > 103* 137*    < > = values in this interval not displayed.      Lipid Panel:   Recent Labs     11/11/22  1316   CHOL 111   HDL 67.6   CHHDL 1.6   VLDL 5   TRIG 27*     Cardiac       No lab exists for component: \"CK\", \"CKMBP\"   Hemoglobin A1C:   Recent Labs     09/14/23  1014 08/07/23  2212 07/06/23  0649 06/19/23  0504 11/11/22  1316 10/15/22  1535 02/24/22  0706 08/12/21  0418 06/10/21  0050 02/28/21  0634 11/10/20  0551 06/26/20  0545   HGBA1C 5.2 5.8* 8.0* 8.6* 6.9* 7.3* 8.4* 5.8 6.0* 7.9* 11.4* 9.3*     TSH/ Free T4:   Recent Labs     11/11/22  1316   TSH 2.58     Iron:   Recent Labs     09/19/23  0731 09/18/23  0758   FERRITIN  --  1,084*   TIBC 100* 74G*   IRONSAT 43 100*     Coag:     ABO: No results found for: \"ABO\"    Past Cardiology Tests (Last 3 Years):  EKG:  Encounter Date: 01/14/24   Electrocardiogram, 12-lead PRN ACS symptoms   Result Value    Ventricular Rate 98    Atrial Rate 98    OK Interval 150    QRS Duration 82    QT Interval 368    QTC Calculation(Bazett) 469    P Axis 60    R Axis 43    T Axis 79    QRS Count 17    Q Onset 222    P Onset 147    P Offset 202    T Offset 406    QTC Fredericia 433    Narrative    Normal sinus rhythm  Low voltage QRS  Prolonged QT  Abnormal ECG  Confirmed by Gerardo Jimenez ([id number]) on 1/16/2024 11:43:42 AM     Echo:  Results for orders placed during the hospital encounter of 01/14/24    Transthoracic Echo (TTE) Complete    Narrative  [facility], [address] [place] [address]  Tel [phone] and Fax [phone]    TRANSTHORACIC ECHOCARDIOGRAM REPORT      Patient Name:     XIOMARA Maya Physician:  [id number] Edward Hernandez DO  Study Date:       1/16/2024           Ordering Provider:  [id number] CHRIS MONTES  MRN/PID:          [id number]            Fellow:  Accession#:       [id number]        Nurse:  Date of  "          1985 / 38      Sonographer:        Sonja Gonzalez RDCS  Birth/Age:        years  Gender:           M                   Additional Staff:  Height:           175.00 cm           Admit Date:         1/14/2024  Weight:           72.00 kg            Admission Status:   Inpatient - Routine  BSA:              1.87 m2             Encounter#:         [id number]  Department          [facility]  Location:  Blood Pressure: 110 /69 mmHg    Study Type:    TRANSTHORACIC ECHO (TTE) COMPLETE  Diagnosis/ICD: Bacteremia-R78.81  Indication:    Bacteremia    Patient History:  Pertinent History: HTN, Hyperlipidemia and PVD.    Study Detail: The following Echo studies were performed: 2D, M-Mode, Doppler and  color flow. Image quality for this study is poor. Technically  challenging study due to body habitus and patient lying in supine  position. Definity used as a contrast agent for endocardial border  definition. Total contrast used for this procedure was 3 mL via IV  push.      PHYSICIAN INTERPRETATION:  Left Ventricle: The left ventricular systolic function is normal, with an estimated ejection fraction of 65-70%. The calculated ejection fraction is normal at 68 % using the Briseno's Bi-plane MOD calculation. There are no regional wall motion abnormalities. The left ventricular cavity size is normal. The left ventricular septal wall thickness is normal. There is normal left ventricular posterior wall thickness. Spectral Doppler shows an impaired relaxation pattern of left ventricular diastolic filling. There is an elevated mean left atrial pressure. There is no definite left ventricular thrombus visualized.  Left Atrium: The left atrium was not assessed.  Right Ventricle: The right ventricle was not well visualized. There is normal right ventricular global systolic function.  Right Atrium: The right atrium is normal in size.  Aortic Valve: The aortic valve is trileaflet. There is mild aortic valve cusp calcification.  Aortic valve regurgitation was not assessed. The peak instantaneous gradient of the aortic valve is 3.5 mmHg.  Mitral Valve: The mitral valve is mildly thickened. Mitral valve regurgitation was not assessed.  Tricuspid Valve: The tricuspid valve is structurally normal. There is trace tricuspid regurgitation. The Doppler estimated RVSP is within normal limits at 15.7 mmHg.  Pulmonic Valve: The pulmonic valve is not well visualized. Pulmonic valve regurgitation was not assessed.  Pericardium: Pericardial effusion was not assessed.  Aorta: The aortic root was not assessed.  Systemic Veins: The inferior vena cava size appears small.  In comparison to the previous echocardiogram(s): Compared with study from 11/14/2023,. Again, poor echocardiographic imaging. Within the limitations of the study, valvular anatomy and function appear similar. There has not been progression of valvular regurgitation. Study however is not of sufficient quality to fully exclude active endocarditis. Consider POLI based on clinical suspicion.      CONCLUSIONS:  1. Poorly visualized anatomical structures due to suboptimal image quality.  2. Left ventricular systolic function is normal with a 65-70% estimated ejection fraction.  3. Spectral Doppler shows an impaired relaxation pattern of left ventricular diastolic filling.  4. No left ventricular thrombus visualized.  5. There is an elevated mean left atrial pressure.  6. RVSP within normal limits.  7. Again, poor echocardiographic imaging. Within the limitations of the study, valvular anatomy and function appear similar. There has not been progression of valvular regurgitation. Study however is not of sufficient quality to fully exclude active endocarditis. Consider POLI based on clinical suspicion.    RECOMMENDATIONS:  Transthoracic echo has low negative predictive value for mass, vegetation, or embolic source. Consider POLI or MRI if clinically indicated.    QUANTITATIVE DATA SUMMARY:  2D  MEASUREMENTS:  Normal Ranges:  IVSd:          0.90 cm   (0.6-1.1cm)  LVPWd:         0.90 cm   (0.6-1.1cm)  LVIDd:         4.20 cm   (3.9-5.9cm)  LVIDs:         2.70 cm  LV Mass Index: 63.4 g/m2  LV % FS        35.7 %    LV SYSTOLIC FUNCTION BY 2D PLANIMETRY (MOD):  Normal Ranges:  EF-A4C View: 67.4 % (>=55%)  EF-A2C View: 68.3 %  EF-Biplane:  68.1 %    LV DIASTOLIC FUNCTION:  Normal Ranges:  MV Peak E:        0.42 m/s    (0.7-1.2 m/s)  MV Peak A:        0.61 m/s    (0.42-0.7 m/s)  E/A Ratio:        0.69        (1.0-2.2)  MV e'             0.03 m/s    (>8.0)  MV lateral e'     0.06 m/s  MV medial e'      0.03 m/s  MV A Dur:         135.00 msec  E/e' Ratio:       14.00       (<8.0)  PulmV Sys Nilesh:    41.10 cm/s  PulmV Munroe Nilesh:   18.60 cm/s  PulmV S/D Nilesh:    2.20  PulmV A Revs Nilesh: 20.40 cm/s  PulmV A Revs Dur: 103.00 msec    MITRAL VALVE:  Normal Ranges:  MV DT: 79 msec (150-240msec)    AORTIC VALVE:  Normal Ranges:  AoV Vmax:     0.94 m/s (<=1.7m/s)  AoV Peak PG:  3.5 mmHg (<20mmHg)  LVOT Max Nilesh: 0.63 m/s (<=1.1m/s)      RIGHT VENTRICLE:  TAPSE: 17.4 mm    TRICUSPID VALVE/RVSP:  Normal Ranges:  Peak TR Velocity: 1.78 m/s  Est. RA Pressure: 3 mmHg  RV Syst Pressure: 15.7 mmHg (< 30mmHg)  IVC Diam:         0.84 cm    Pulmonary Veins:  PulmV A Revs Dur: 103.00 msec  PulmV A Revs Nilesh: 20.40 cm/s  PulmV Munroe Nilesh:   18.60 cm/s  PulmV S/D Nilesh:    2.20  PulmV Sys Nilesh:    41.10 cm/s      [id number] Edward Hernandez DO  Electronically signed on 1/16/2024 at 6:24:23 PM        ** Final **      Results for orders placed during the hospital encounter of 11/10/23    Transthoracic Echo (TTE) Complete    [facility], [address] [place] [address]  Tel [phone] and Fax [phone]    TRANSTHORACIC ECHOCARDIOGRAM REPORT      Patient Name:      XIOMARA Maya Physician:    [id number] Mik Grissom MD  Study Date:        11/14/2023           Ordering Provider:    [id number] COHEN  NATHAN  VASQUEZ  MRN/PID:           [id number]             Fellow:  Accession#:        [id number]         Nurse:  Date of Birth/Age: 1985 / 38 years Sonographer:          Sonja Gonzalez RDCS  Gender:            M                    Additional Staff:  Height:            175.00 cm            Admit Date:           11/10/2023  Weight:            79.00 kg             Admission Status:     Inpatient -  Routine  BSA:               1.95 m2              Encounter#:           [id number]  Department Location:  [facility] Telemetry  Blood Pressure: 126 /78 mmHg    Study Type:    TRANSTHORACIC ECHO (TTE) COMPLETE  Diagnosis/ICD: Bacteremia-R78.81  Indication:    Cellulitis and abscess of left leg, Bacteremia associated with  intravascular line, initial encounter    Patient History:  Pertinent History: HTN, Hyperlipidemia and PVD.    Study Detail: The following Echo studies were performed: 2D, M-Mode, Doppler and  color flow. Technically challenging study due to patient lying in  supine position and body habitus. Definity used as a contrast  agent for endocardial border definition. Total contrast used for  this procedure was 3 mL via IV push.      PHYSICIAN INTERPRETATION:  Left Ventricle: The left ventricular systolic function is normal, with an estimated ejection fraction of 55%. There are no regional wall motion abnormalities. The left ventricular cavity size is normal. The left ventricular septal wall thickness is mildly increased. Spectral Doppler shows an impaired relaxation pattern of left ventricular diastolic filling.  Left Atrium: The left atrium is normal in size.  Right Ventricle: The right ventricle is normal in size. There is normal right ventricular global systolic function.  Right Atrium: The right atrium is normal in size.  Aortic Valve: The aortic valve is trileaflet. There is mild aortic valve cusp calcification. There is mild aortic valve thickening. There is no evidence of aortic valve regurgitation. The peak  instantaneous gradient of the aortic valve is 9.4 mmHg. The mean gradient of the aortic valve is 5.0 mmHg.  Mitral Valve: The mitral valve is mildly thickened. There is no evidence of mitral valve regurgitation. There is some anterior leaflet calcification and papillary calcification.  Tricuspid Valve: The tricuspid valve is structurally normal. There is trace tricuspid regurgitation. The Doppler estimated RVSP is within normal limits at 22.2 mmHg.  Pulmonic Valve: The pulmonic valve is structurally normal. There is no indication of pulmonic valve regurgitation.  Pericardium: There is no pericardial effusion noted.  Aorta: The aortic root is normal.  In comparison to the previous echocardiogram(s): Compared with study from 6/22/2023, again echocardiographic views are limited. There is no significant difference in valvular anatomy and function to suggest active endocarditis.      CONCLUSIONS:  1. Left ventricular systolic function is normal with a 55% estimated ejection fraction.  2. Spectral Doppler shows an impaired relaxation pattern of left ventricular diastolic filling.  3. No evidence of vegetations present.  4. RVSP within normal limits.  5. Poorly visualized anatomical structures due to suboptimal image quality.  6. Compared with study from 6/22/2023, again echocardiographic views are limited. There is no significant difference in valvular anatomy and function to suggest active endocarditis.    QUANTITATIVE DATA SUMMARY:  2D MEASUREMENTS:  Normal Ranges:  LAs:           3.10 cm   (2.7-4.0cm)  IVSd:          1.20 cm   (0.6-1.1cm)  LVPWd:         1.00 cm   (0.6-1.1cm)  LVIDd:         3.90 cm   (3.9-5.9cm)  LVIDs:         2.90 cm  LV Mass Index: 72.0 g/m2  LV % FS        25.6 %    LA VOLUME:  Normal Ranges:  LA Vol A4C:        32.9 ml    (22+/-6mL/m2)  LA Vol A2C:        49.3 ml  LA Vol BP:         40.4 ml  LA Vol Index A4C:  16.9ml/m2  LA Vol Index A2C:  25.4 ml/m2  LA Vol Index BP:   20.8 ml/m2  LA Area A4C:        13.1 cm2  LA Area A2C:       16.0 cm2  LA Major Axis A4C: 4.4 cm  LA Major Axis A2C: 4.4 cm  LA Volume Index:   20.8 ml/m2    RA VOLUME BY A/L METHOD:  Normal Ranges:  RA Area A4C: 9.9 cm2    AORTA MEASUREMENTS:  Normal Ranges:  Ao Sinus, d: 2.41 cm (2.1-3.5cm)  Ao STJ, d:   2.49 cm (1.7-3.4cm)  Asc Ao, d:   2.40 cm (2.1-3.4cm)    LV SYSTOLIC FUNCTION BY 2D PLANIMETRY (MOD):  Normal Ranges:  EF-A4C View: 54.1 % (>=55%)  EF-A2C View: 53.7 %  EF-Biplane:  54.1 %    LV DIASTOLIC FUNCTION:  Normal Ranges:  MV Peak E:        0.45 m/s    (0.7-1.2 m/s)  MV Peak A:        0.69 m/s    (0.42-0.7 m/s)  E/A Ratio:        0.65        (1.0-2.2)  MV e'             0.06 m/s    (>8.0)  MV lateral e'     0.07 m/s  MV medial e'      0.06 m/s  MV A Dur:         108.00 msec  E/e' Ratio:       7.48        (<8.0)  PulmV Sys Nilesh:    41.10 cm/s  PulmV Munroe Nilesh:   24.20 cm/s  PulmV S/D Nilesh:    1.70  PulmV A Revs Nilesh: 18.20 cm/s  PulmV A Revs Dur: 111.00 msec    MITRAL VALVE:  Normal Ranges:  MV DT: 106 msec (150-240msec)    AORTIC VALVE:  Normal Ranges:  AoV Vmax:                1.53 m/s (<=1.7m/s)  AoV Peak P.4 mmHg (<20mmHg)  AoV Mean P.0 mmHg (1.7-11.5mmHg)  LVOT Max Nilesh:            0.93 m/s (<=1.1m/s)  AoV VTI:                 22.80 cm (18-25cm)  LVOT VTI:                16.10 cm  LVOT Diameter:           2.10 cm  (1.8-2.4cm)  AoV Area, VTI:           2.45 cm2 (2.5-5.5cm2)  AoV Area,Vmax:           2.11 cm2 (2.5-4.5cm2)  AoV Dimensionless Index: 0.71      RIGHT VENTRICLE:  RV Basal 2.73 cm  RV Mid   2.09 cm  RV Major 6.9 cm  TAPSE:   17.9 mm    TRICUSPID VALVE/RVSP:  Normal Ranges:  Peak TR Velocity: 2.19 m/s  Est. RA Pressure: 3 mmHg  RV Syst Pressure: 22.2 mmHg (< 30mmHg)  IVC Diam:         0.96 cm    PULMONIC VALVE:  Normal Ranges:  PV Accel Time: 90 msec  (>120ms)  PV Max Nilesh:    1.0 m/s  (0.6-0.9m/s)  PV Max P.2 mmHg    Pulmonary Veins:  PulmV A Revs Dur: 111.00 msec  PulmV A Revs Nilesh:  18.20 cm/s  PulmV Munroe Nilesh:   24.20 cm/s  PulmV S/D Nilesh:    1.70  PulmV Sys Nilesh:    41.10 cm/s      [id number] Mik Grissom MD  Electronically signed on 11/14/2023 at 12:37:35 PM        ** Final **    Ejection Fractions:  LV biplane EF   Date/Time Value Ref Range Status   01/16/2024 01:59 PM 67     11/14/2023 11:37 AM 53       Cath:  No results found for this or any previous visit.    Stress Test:  No results found for this or any previous visit.    Cardiac Imaging:  No results found for this or any previous visit.        === 01/14/24 ===    CT CHEST ABDOMEN PELVIS WO CONTRAST    - Impression -  1.   Air in vasculature in the chest, abdomen and pelvis as described  above which may relate to vascular access, however clinical  correlation is recommended if there is concern for other etiologies.    2. Moderate size partially loculated left pleural effusion and small  right pleural effusion and bilateral atelectatic/consolidative  opacities. New 2 cm x 0.9 cm subpleural opacity right lower lobe with  mild calcifications.    3.  Right femoral central venous catheter which extends into the  chest and terminates in the SVC.    4.  Lymphadenopathy in the imaged lower neck and mediastinal  lymphadenopathy. Multiple prominent retroperitoneal lymph nodes are  also noted and enlarged inguinal lymph nodes. The lymph nodes may be  reactive in nature, however clinical correlation is recommended there  is concern for malignancy or other etiology.    5.   Gallbladder wall thickening and pericholecystic edema is  nonspecific and may relate to underlying liver disease,  hypoproteinemia or cardiac disease, however if there is concern for  acute cholecystitis/acute gallbladder pathology, right upper quadrant  ultrasound is recommended for further evaluation.    6.   Low attenuation tubular opacity in the pancreatic tail measuring  5 mm in diameter may correspond to dilated pancreatic duct and may be  better assessed on follow-up contrast  enhanced imaging.    7.  Bilateral renal atrophy and multiple bilateral renal cysts and  subcentimeter hypodensities too small to characterize. Limited  evaluation for renal mass on noncontrast examination.    8.  Extensive atherosclerotic calcification in the chest, abdomen and  pelvis.    9.  Underdistention versus urinary bladder wall thickening; please  correlate urinalysis to evaluate for cystitis.    MACRO:  None    Signed by: Houston Conde 1/14/2024 6:05 PM  Dictation workstation:   [id number]     Assessment/Plan  Assessment/Plan   Active Problems:  There are no active Hospital Problems.        ESRD on HD, CLABSI s/p removal of tunneled right femoral HD cath, concern for endocarditis  -POLI with Dr. Bach 1/19/24  -temporary HD cath placement by Dr. Dempsey on 1/19/24       I spent 30 minutes in the professional and overall care of this patient.      Andi Bazan, APRN-CNP, DNP

## 2024-01-19 NOTE — ANESTHESIA PREPROCEDURE EVALUATION
Patient: Edwar Hemphill    Procedure Information       Date/Time: 01/19/24 1300    Scheduled providers: Luigi Bach MD    Procedure: TRANSESOPHAGEAL ECHO (POLI)    [facility]            Relevant Problems   Cardiovascular   (+) HLD (hyperlipidemia)   (+) Hypertension   (+) PVD (peripheral vascular disease) (CMS/HCC)      Endocrine   (+) Hypothyroidism   (+) Type 1 diabetes mellitus (CMS/HCC)      GI   (+) GERD (gastroesophageal reflux disease)      /Renal   (+) ESRD (end stage renal disease) on dialysis (CMS/HCC)      Infectious Disease   (+) Septic shock (CMS/HCC)       Clinical information reviewed:   Tobacco  Allergies  Meds   Med Hx  Surg Hx   Fam Hx  Soc Hx        NPO/Void Status  Date of Last Liquid: 01/19/24  Time of Last Liquid: 0000  Date of Last Solid: 01/19/24  Time of Last Solid: 0000           Past Medical History:   Diagnosis Date    Chronic kidney disease     Diabetes mellitus (CMS/HCC)     ESRD (end stage renal disease) (CMS/HCC)     Essential (primary) hypertension 05/26/2017    HTN (hypertension), benign    GERD (gastroesophageal reflux disease)     Hyperlipidemia     Hypothyroidism       Past Surgical History:   Procedure Laterality Date    ARM AMPUTATION AT ELBOW Right 05/2021    AV FISTULA PLACEMENT W/ PTFE      CT AORTA AND BILATERAL ILIOFEMORAL RUNOFF ANGIOGRAM W AND/OR WO IV CONTRAST  02/09/2023    CT AORTA AND BILATERAL ILIOFEMORAL RUNOFF ANGIOGRAM W AND/OR WO IV CONTRAST 2/9/2023 [facility]    IR CVC EXCHANGE  11/13/2023    IR CVC EXCHANGE 11/13/2023 [facility] CVEPINV    LEG AMPUTATION THROUGH KNEE Left 07/08/2023    LEG AMPUTATION THROUGH LOWER TIBIA AND FIBULA Left 07/05/2023    TOE AMPUTATION Left 02/17/2023    left 4th    WOUND DEBRIDEMENT Left 07/26/2023    leg    WOUND DEBRIDEMENT Left 08/02/2023    multiple leg debridements     Social History     Tobacco Use    Smoking status: Every Day     Packs/day: 0.25     Years: 15.00      Additional pack years: 0.00     Total pack years: 3.75     Types: Cigarettes    Smokeless tobacco: Never      Current Outpatient Medications   Medication Instructions    acetaminophen (TYLENOL) 650 mg, oral, Every 4 hours PRN    B complex-vitamin C-folic acid (Nephrocaps) 1 mg capsule 1 capsule, oral, Daily    bisacodyl (Dulcolax, bisacodyl,) 10 mg suppository rectal, Daily PRN    insulin glargine (LANTUS) 10 Units, subcutaneous, Nightly, Take as directed per insulin instructions.    insulin lispro (HUMALOG) 0-15 Units, subcutaneous, 3 times daily with meals, Take as directed per insulin instructions.    levothyroxine (SYNTHROID, LEVOXYL) 125 mcg, oral, Daily before breakfast    loperamide (IMODIUM A-D) 4 mg, oral, Every 2 hour PRN    melatonin 3 mg, oral, Nightly PRN    oxyCODONE-acetaminophen (Percocet) 5-325 mg tablet 1 tablet, oral, Every 6 hours PRN    pantoprazole (PROTONIX) 40 mg, oral, Daily before breakfast, Do not crush, chew, or split.    sevelamer carbonate (RENVELA) 2,400 mg, oral, 3 times daily with meals    simethicone (MYLICON) 80 mg, oral, Every 8 hours PRN      Allergies   Allergen Reactions    Cashew Nut Anaphylaxis and Swelling     cashews        Chemistry    Lab Results   Component Value Date/Time     01/19/2024 0536    K 3.8 01/19/2024 0536    CL 93 (L) 01/19/2024 0536    CO2 20 (L) 01/19/2024 0536    BUN 52 (H) 01/19/2024 0536    CREATININE 8.95 (H) 01/19/2024 0536    Lab Results   Component Value Date/Time    CALCIUM 8.5 (L) 01/19/2024 0536    ALKPHOS 133 (H) 01/18/2024 0503    AST 12 01/18/2024 0503    ALT 9 (L) 01/18/2024 0503    BILITOT 0.5 01/18/2024 0503          Lab Results   Component Value Date/Time    WBC 8.0 01/19/2024 0536    HGB 7.6 (L) 01/19/2024 0536    HCT 23.4 (L) 01/19/2024 0536     (L) 01/19/2024 0536     Lab Results   Component Value Date/Time    PROTIME 11.9 09/13/2023 1351    INR 1.1 09/13/2023 1351     Encounter Date: 01/14/24   Electrocardiogram,  12-lead PRN ACS symptoms   Result Value    Ventricular Rate 98    Atrial Rate 98    ND Interval 150    QRS Duration 82    QT Interval 368    QTC Calculation(Bazett) 469    P Axis 60    R Axis 43    T Axis 79    QRS Count 17    Q Onset 222    P Onset 147    P Offset 202    T Offset 406    QTC Fredericia 433    Narrative    Normal sinus rhythm  Low voltage QRS  Prolonged QT  Abnormal ECG  Confirmed by Gerardo Jimenez ([id number]) on 1/16/2024 11:43:42 AM     No results found for this or any previous visit from the past 1095 days.   Echo 1/15/2024:  Left Ventricle: The left ventricular systolic function is normal, with an estimated ejection fraction of 65-70%. The calculated ejection fraction is normal at 68 % using the Briseno's Bi-plane MOD calculation. There are no regional wall motion abnormalities. The left ventricular cavity size is normal. The left ventricular septal wall thickness is normal. There is normal left ventricular posterior wall thickness. Spectral Doppler shows an impaired relaxation pattern of left ventricular diastolic filling. There is an elevated mean left atrial pressure. There is no definite left ventricular thrombus visualized.  Left Atrium: The left atrium was not assessed.  Right Ventricle: The right ventricle was not well visualized. There is normal right ventricular global systolic function.  Right Atrium: The right atrium is normal in size.  Aortic Valve: The aortic valve is trileaflet. There is mild aortic valve cusp calcification. Aortic valve regurgitation was not assessed. The peak instantaneous gradient of the aortic valve is 3.5 mmHg.  Mitral Valve: The mitral valve is mildly thickened. Mitral valve regurgitation was not assessed.  Tricuspid Valve: The tricuspid valve is structurally normal. There is trace tricuspid regurgitation. The Doppler estimated RVSP is within normal limits at 15.7 mmHg.  Pulmonic Valve: The pulmonic valve is not well visualized. Pulmonic valve regurgitation  "was not assessed.  Pericardium: Pericardial effusion was not assessed.  Aorta: The aortic root was not assessed.  Systemic Veins: The inferior vena cava size appears small.  In comparison to the previous echocardiogram(s): Compared with study from 11/14/2023,. Again, poor echocardiographic imaging. Within the limitations of the study, valvular anatomy and function appear similar. There has not been progression of valvular regurgitation. Study however is not of sufficient quality to fully exclude active endocarditis. Consider POLI based on clinical suspicion.        CONCLUSIONS:   1. Poorly visualized anatomical structures due to suboptimal image quality.   2. Left ventricular systolic function is normal with a 65-70% estimated ejection fraction.   3. Spectral Doppler shows an impaired relaxation pattern of left ventricular diastolic filling.   4. No left ventricular thrombus visualized.   5. There is an elevated mean left atrial pressure.   6. RVSP within normal limits.   7. Again, poor echocardiographic imaging. Within the limitations of the study, valvular anatomy and function appear similar. There has not been progression of valvular regurgitation. Study however is not of sufficient quality to fully exclude active endocarditis. Consider POLI based on clinical suspicion.    Visit Vitals  /66   Pulse 102   Temp 37 °C (98.6 °F) (Oral)   Resp 18   Ht 1.753 m (5' 9\")   Wt 70.3 kg (155 lb)   SpO2 96%   BMI 22.89 kg/m²   Smoking Status Every Day   BSA 1.85 m²        Physical Exam    Airway  Mallampati: III  TM distance: >3 FB  Neck ROM: full     Cardiovascular   Rhythm: regular  Rate: normal     Dental - normal exam     Pulmonary   Breath sounds clear to auscultation     Abdominal - normal exam              Anesthesia Plan    History of general anesthesia?: yes  History of complications of general anesthesia?: no    ASA 3     general   (TIVA)  intravenous induction   Anesthetic plan and risks discussed with " patient.    Plan discussed with CAA and CRNA.

## 2024-01-19 NOTE — CONSULTS
Reason For Consult  ESRD on hemodialysis     History Of Present Illness  Ewdar Hemphill is a 38 y.o. male with a past medical history of end-stage renal disease on hemodialysis via right femoral TDC who resides at [facility]. 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  He later had stump debridement on 7/27 with wound VAC placement.  He had polymicrobial infection including resistant E coli, Acinetobacter baumannii and Enterobacter fecalis. He was admitted in September with positive blood cultures growing Acinetobacter baumannii and Stenotrophomonas. He went to IR for dialysis line exchange.  He then was admitted again in November where CT noted left sided fluid collection concerning for abscess.  He grew MRSA and Streptococcus, catheter was exchanged on November 13, he completed vancomycin at [facility].      He comes in now with fever, chills, congestion.  Attempt was made at triple-lumen catheter placement in the neck, could not pass a wire.  Was seen by Dr. Bullock, on norepinephrine, vancomycin and Zosyn.  We dialyzed him and removed the dialysis line, this is day 3 of the line holiday.  Plans for POLI.    Past Medical History:   Diagnosis Date    Essential (primary) hypertension 05/26/2017    HTN (hypertension), benign    Personal history of other endocrine, nutritional and metabolic disease 05/26/2017    History of diabetes mellitus       Past Surgical History:   Procedure Laterality Date    CT AORTA AND BILATERAL ILIOFEMORAL RUNOFF ANGIOGRAM W AND/OR WO IV CONTRAST  2/9/2023    CT AORTA AND BILATERAL ILIOFEMORAL RUNOFF ANGIOGRAM W AND/OR WO IV CONTRAST 2/9/2023 [facility]CTOR OFFICE [facility]     IR CVC EXCHANGE  11/13/2023    IR CVC EXCHANGE 11/13/2023 [facility] CVEPINV       Social History     Tobacco Use    Smoking status: Every Day     Packs/day: 0.25     Years: 15.00     Additional pack years: 0.00     Total pack years: 3.75     Types: Cigarettes    Smokeless tobacco: Never        Family History  Family History   Problem Relation Name Age of Onset    Other (DIABETES DUE TO UNDERLYING CONDITION WITH MICROALBUMINURIA) Father      Other (DIABETES DUE TO UNDERLYING CONDITION WITH MICROALBUMINURIA [Other]) Other AUNT     Other (DIABETES DUE TO UNDERLYING CONDITION WITH MICROALBUMINURIA [Other]) Other GP         Allergies  Cashew nut    Review of Systems   10 point review of systems obtained and negative unless stated in HPI:     Physical Exam   Constitutional: Well developed, awake/alert/oriented  x3   Eyes: Periorbital swelling   ENMT: mucous membranes moist   Head/Neck: Facial edema  Difficult to assess JVD   Respiratory/Thorax: Diminished bibasilar breath sounds,  no wheezing, rales or rhonchi   Cardiovascular: regular rhythm, normal  S1 and S2   Gastrointestinal: Nondistended, soft, non-tender,  no rebound tenderness or guarding   Genitourinary: No Castanon   Extremities: Less edema on right than prior   L AKA   RUE amputation  R femoral HD catheter   Neurological: No asterixis   Psychological: Appropriate mood and behavior   Skin: L AKA stump  Right femoral TDC           I&O 24HR  No intake or output data in the 24 hours ending 01/19/24 1000      Vitals 24HR  Heart Rate:  []   Temp:  [36.4 °C (97.5 °F)-37 °C (98.6 °F)]   Resp:  [10-20]   BP: (117-143)/(77-88)   Weight:  [70.3 kg (155 lb)]   SpO2:  [93 %-96 %]     Scheduled Medications  B complex-vitamin C-folic acid, 1 capsule, oral, Daily  epoetin tyson or biosimilar, 10,000 Units, subcutaneous, Once per day on Mon Wed Fri  heparin (porcine), 5,000 Units, subcutaneous, q8h  insulin glargine, 4 Units, subcutaneous, Nightly  insulin lispro, 0-10 Units,  subcutaneous, TID with meals  levothyroxine, 125 mcg, oral, Daily  lidocaine, 5 mL, infiltration, Once  lidocaine PF, 10 mL, subcutaneous, Once  pantoprazole, 40 mg, oral, BID   Or  pantoprazole, 40 mg, intravenous, BID  perflutren lipid microspheres, 1 mL of dilution, intravenous, Once in imaging  sevelamer carbonate, 2,400 mg, oral, TID with meals  sodium bicarbonate 1 mEq in lidocaine-epinephrine (Xylocaine W/EPI) 1 %-1:100,000 9 mL Syringe, 10 mL, subcutaneous, Once      Continuous medications       PRN medications: acetaminophen, bisacodyl, dextrose 10 % in water (D10W), dextrose, glucagon, heparin flush, ondansetron ODT **OR** ondansetron, vancomycin     Relevant Results  Results from last 7 days   Lab Units 01/19/24  0536 01/18/24  0503 01/17/24  0541 01/15/24  0632 01/14/24  1319   WBC AUTO x10*3/uL 8.0 8.3 9.4   < > 10.7   HEMOGLOBIN g/dL 7.6* 8.1* 7.9*   < > 10.7*   HEMATOCRIT % 23.4* 25.6* 24.3*   < > 35.2*   PLATELETS AUTO x10*3/uL 134* 141* 115*   < > 190   NEUTROS PCT AUTO %  --   --   --   --  85.3   LYMPHS PCT AUTO %  --   --   --   --  7.7   MONOS PCT AUTO %  --   --   --   --  5.1   EOS PCT AUTO %  --   --   --   --  1.0    < > = values in this interval not displayed.        Results from last 7 days   Lab Units 01/19/24  0536 01/18/24  0503 01/17/24  0541 01/15/24  0632 01/14/24  1319   SODIUM mmol/L 136 136 133*   < > 131*   POTASSIUM mmol/L 3.8 3.6 5.0   < > 4.4   CHLORIDE mmol/L 93* 94* 89*   < > 93*   CO2 mmol/L 20* 24 20*   < > 25   BUN mg/dL 52* 46* 41*   < > 27*   CREATININE mg/dL 8.95* 6.97* 6.42*   < > 7.74*   CALCIUM mg/dL 8.5* 9.0 8.5*   < > 9.8   PROTEIN TOTAL g/dL  --  6.4  --   --  8.3*   BILIRUBIN TOTAL mg/dL  --  0.5  --   --  0.6   ALK PHOS U/L  --  133*  --   --  183*   ALT U/L  --  9*  --   --  10   AST U/L  --  12  --   --  13   GLUCOSE mg/dL 252* 131* 295*   < > 85    < > = values in this interval not displayed.        NM hepatobiliary   Final Result   1. Limited examination  secondary to technical difficulties with a   single static image approximately 90 minutes post-injection with   radiotracer uptake within the liver, gallbladder, and small bowel,   findings exclude acute cholecystitis.        I personally reviewed the images/study, and I agree with the findings   as stated above. This study was interpreted at [facility], [place], [place].        MACRO:   None        Signed by: Nessa Ayala 1/18/2024 9:53 AM   Dictation workstation:   [id number]      NM injection no scan   Final Result      XR abdomen 1 view   Final Result   1. A nonspecific, nonobstructive bowel gas pattern.        MACRO:   None        Signed by: Dean Stanton 1/17/2024 11:29 AM   Dictation workstation:   [id number]      Transthoracic Echo (TTE) Complete   Final Result      US gallbladder   Final Result   Prominent gallbladder wall thickening measuring 10 mm in thickness   and pericholecystic edema. There is however no definite evidence of   gallstones and per the sonographer, sonographic Muñiz sign is   absent. The gallbladder wall thickening is therefore nonspecific and   may relate to underlying liver disease, hypoproteinemia or cardiac   disease. Clinical correlation is recommended and if there is concern   for acalculous cholecystitis, HIDA scan may be obtained for further   evaluation.        MACRO:   None        Signed by: Houston Conde 1/14/2024 7:32 PM   Dictation workstation:   [id number]      CT chest abdomen pelvis wo IV contrast   Final Result   1.   Air in vasculature in the chest, abdomen and pelvis as described   above which may relate to vascular access, however clinical   correlation is recommended if there is concern for other etiologies.        2. Moderate size partially loculated left pleural effusion and small   right pleural effusion and bilateral atelectatic/consolidative   opacities. New 2 cm x 0.9 cm subpleural opacity right lower lobe with   mild  calcifications.        3.  Right femoral central venous catheter which extends into the   chest and terminates in the SVC.        4.  Lymphadenopathy in the imaged lower neck and mediastinal   lymphadenopathy. Multiple prominent retroperitoneal lymph nodes are   also noted and enlarged inguinal lymph nodes. The lymph nodes may be   reactive in nature, however clinical correlation is recommended there   is concern for malignancy or other etiology.        5.   Gallbladder wall thickening and pericholecystic edema is   nonspecific and may relate to underlying liver disease,   hypoproteinemia or cardiac disease, however if there is concern for   acute cholecystitis/acute gallbladder pathology, right upper quadrant   ultrasound is recommended for further evaluation.        6.   Low attenuation tubular opacity in the pancreatic tail measuring   5 mm in diameter may correspond to dilated pancreatic duct and may be   better assessed on follow-up contrast enhanced imaging.        7.  Bilateral renal atrophy and multiple bilateral renal cysts and   subcentimeter hypodensities too small to characterize. Limited   evaluation for renal mass on noncontrast examination.        8.  Extensive atherosclerotic calcification in the chest, abdomen and   pelvis.        9.  Underdistention versus urinary bladder wall thickening; please   correlate urinalysis to evaluate for cystitis.        MACRO:   None        Signed by: Houston Conde 1/14/2024 6:05 PM   Dictation workstation:   [id number]      XR chest 2 views   Final Result   1. Small left pleural effusion with associated atelectasis and/or   pneumonitis.   2. Right basilar atelectasis and/or scarring.        MACRO:   None        Signed by: Jarvis Holcomb 1/14/2024 2:51 PM   Dictation workstation:   [id number]      Transesophageal Echo (POLI)    (Results Pending)   IR CVC nontunneled    (Results Pending)         Assessment/Plan     Edwar Hemphill is a 38 y.o. male with a past medical  history of end-stage renal disease on hemodialysis via right femoral TDC who resides at [facility]. 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  He later had stump debridement on 7/27 with wound VAC placement.  He had polymicrobial infection including resistant E coli, Acinetobacter baumannii and Enterobacter fecalis. He was admitted in September with positive blood cultures growing Acinetobacter baumannii and Stenotrophomonas. He went to IR for dialysis line exchange.  He then was admitted again in November where CT noted left sided fluid collection concerning for abscess.  He grew MRSA and Streptococcus, catheter was exchanged on November 13, he completed vancomycin at [facility].      He comes in now with fever, chills, congestion.  Attempt was made at triple-lumen catheter placement in the neck, could not pass a wire.  Was seen by Dr. Bullock, on norepinephrine, vancomycin and Zosyn.    He is less fluid overloaded than is typical for him.  He had a full dialysis last Friday on the 12th, I dialyzed him again on Tuesday, we then removed his dialysis line.  This is his third day of the line holiday.  Cultures are still negative but it looks as if he is having more cultures drawn today.  MRSA in the blood, we will follow that forward.  He will have an echocardiogram today, we will get a temporary dialysis line in, I will dialyze him later today or tomorrow.          Principal Problem:    Septic shock (CMS/[facility])      I spent 35 minutes in the professional and overall care of this patient.      Zeeshan Gonzalez MD

## 2024-01-19 NOTE — PROGRESS NOTES
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 GI  consulted for nausea, suspect multifactorial due to sepsis, medications, possibly chronic esophagitis, gastritis. HIDA was negative for acute cholecystitis.     - antibiotics per ID  - supportive care with antiemetics as needed  - recommend twice daily PPI  - diet as tolerated  - elevate head of the bed with all meals  -no further plans from GI, will sign off, please call if any questions or further assistance needed     LOS: 5 days     Edwar Hemphill is a 38 y.o. male who was admitted with Septic shock (CMS/[facility]). He reports his symptoms are unchanged. For placement of tunneled catheter today, as well as POLI.  Denies any nausea today, ate some dinner, stated he had some mild nausea, no vomiting.    Subjective:    Patient expresses he is feeling okay today, denies nausea.  Patient denies abdominal pain    Objective:    Vital signs in last 24 hours:  Temp:  [36.4 °C (97.5 °F)-37 °C (98.6 °F)] 37 °C (98.6 °F)  Heart Rate:  [] 94  Resp:  [10-20] 18  BP: (117-143)/(77-88) 117/78    Intake/Output last 3 shifts:  I/O last 3 completed shifts:  In: - (0 mL/kg)   Out: 250 (3.6 mL/kg) [Stool:250]  Weight: 70.3 kg   Intake/Output this shift:  No intake/output data recorded.    Physical Exam     Results for orders placed or performed during the hospital encounter of 01/14/24 (from the past 24 hour(s))   Blood Culture    Specimen: Peripheral Venipuncture; Blood culture   Result Value Ref Range    Blood Culture Loaded on Instrument - Culture in progress    Blood Culture     Specimen: Peripheral Venipuncture; Blood culture   Result Value Ref Range    Blood Culture Loaded on Instrument - Culture in progress    POCT GLUCOSE   Result Value Ref Range    POCT Glucose 170 (H) 74 - 99 mg/dL   POCT GLUCOSE   Result Value Ref Range    POCT Glucose 172 (H) 74 - 99 mg/dL   POCT GLUCOSE   Result Value Ref Range    POCT Glucose 180 (H) 74 - 99 mg/dL   Renal Function Panel   Result Value Ref Range    Glucose 252 (H) 74 - 99 mg/dL    Sodium 136 136 - 145 mmol/L    Potassium 3.8 3.5 - 5.3 mmol/L    Chloride 93 (L) 98 - 107 mmol/L    Bicarbonate 20 (L) 21 - 32 mmol/L    Anion Gap 27 (H) 10 - 20 mmol/L    Urea Nitrogen 52 (H) 6 - 23 mg/dL    Creatinine 8.95 (H) 0.50 - 1.30 mg/dL    eGFR 7 (L) >60 mL/min/1.73m*2    Calcium 8.5 (L) 8.6 - 10.3 mg/dL    Phosphorus 4.8 2.5 - 4.9 mg/dL    Albumin 2.5 (L) 3.4 - 5.0 g/dL   Vancomycin   Result Value Ref Range    Vancomycin 21.5 (H) 5.0 - 20.0 ug/mL   CBC   Result Value Ref Range    WBC 8.0 4.4 - 11.3 x10*3/uL    nRBC 0.0 0.0 - 0.0 /100 WBCs    RBC 2.92 (L) 4.50 - 5.90 x10*6/uL    Hemoglobin 7.6 (L) 13.5 - 17.5 g/dL    Hematocrit 23.4 (L) 41.0 - 52.0 %    MCV 80 80 - 100 fL    MCH 26.0 26.0 - 34.0 pg    MCHC 32.5 32.0 - 36.0 g/dL    RDW 20.4 (H) 11.5 - 14.5 %    Platelets 134 (L) 150 - 450 x10*3/uL   POCT GLUCOSE   Result Value Ref Range    POCT Glucose 332 (H) 74 - 99 mg/dL

## 2024-01-19 NOTE — PROGRESS NOTES
Vancomycin Dosing by Pharmacy- FOLLOW UP    Edwar Hemphill is a 38 y.o. year old male who Pharmacy has been consulted for vancomycin dosing for line infections. Based on the patient's indication and renal status this patient is being dosed based on a goal pre-HD level of 15-25.     Current vancomycin dose: DOSED BY LEVEL    Most recent random level: 21.5 mcg/mL with AM labs on 1/19/24 (the 6th day of therapy)    Visit Vitals  /78 (BP Location: Left arm, Patient Position: Lying)   Pulse 94   Temp 37 °C (98.6 °F) (Oral)   Resp 18        Lab Results   Component Value Date    CREATININE 8.95 (H) 01/19/2024    CREATININE 6.97 (H) 01/18/2024    CREATININE 6.42 (H) 01/17/2024    CREATININE 5.24 (H) 01/16/2024        Lab Results   Component Value Date    PATIENTTEMP 37.0 01/14/2024    PATIENTTEMP 37.0 07/09/2023    PATIENTTEMP 37.0 07/08/2023    PATIENTTEMP 37.0 07/08/2023        Assessment/Plan  Given the HD line holiday, the patient has been on an irregular dialysis schedule which effects IV vancomycin dosing. Since patient will not receive dialysis today we will await any re-dosing at this time.   The next level has been ordered with AM labs on 1/20/23.  Will continue to monitor renal function daily while on vancomycin and order serum creatinine at least every 48 hours if not already ordered.  Follow for continued vancomycin needs, clinical response, and signs/symptoms of toxicity.     Stefanie Rodríguez, PharmD

## 2024-01-19 NOTE — PROGRESS NOTES
Edwar Hemphill is a 38 y.o. male on day 5 of admission presenting with Septic shock (CMS/HCC).         Patient was transferred out of ICU and he is now in SDU. ID is still waiting for blood cultures to result for placement of tunneled catheter. If patient is in need of dialysis sooner will need temp cath placed. Patient is not med ready for discharge yet.

## 2024-01-19 NOTE — CONSULTS
Consults    Reason For Consult  Diabetes    History Of Present Illness  Edwar Hemphill is a 38 y.o. male admitted 5 days ago with sepsis    Duration of type 2 diabetes mellitus:  27 years  Complications:  ESRD  Amputations    Total 4 units lispro given yesterday    Home regimen:  Lantus 10 units at bedtime  Lispro scale [facility]    Medications    Current Facility-Administered Medications:     acetaminophen (Tylenol) tablet 650 mg, 650 mg, oral, q6h PRN, AMARILYS Sue-CNP, 650 mg at 01/15/24 2058    B complex-vitamin C-folic acid (Nephrocaps) capsule 1 capsule, 1 capsule, oral, Daily, Jessica Blanco APRN-CNP    bisacodyl (Dulcolax) EC tablet 5 mg, 5 mg, oral, Daily PRN, Jessica Blanco APRN-CNP    dextrose 10 % in water (D10W) infusion, 0.3 g/kg/hr, intravenous, Once PRN, Jessica Blanco APRN-CNP    dextrose 50 % injection 25 g, 25 g, intravenous, q15 min PRN, Jessica Blanco APRN-CNP    epoetin tyson-epbx (Retacrit) injection 10,000 Units, 10,000 Units, subcutaneous, Once per day on Mon Wed Fri, Jessica Blanco APRN-CNP, 10,000 Units at 01/17/24 1841    glucagon (Glucagen) injection 1 mg, 1 mg, intramuscular, q15 min PRN, Jessica Blanco APRN-CNP    heparin (porcine) injection 5,000 Units, 5,000 Units, subcutaneous, q8h, Jessica Blanco APRN-CNP, 5,000 Units at 01/18/24 1423    heparin flush 10 unit/mL syringe 10 Units, 10 Units, intra-catheter, PRN, Jessica Blanco APRN-CNP, 10 Units at 01/16/24 0353    insulin lispro (HumaLOG) injection 0-10 Units, 0-10 Units, subcutaneous, TID with meals, Jessica Blanco APRN-CNP, 2 Units at 01/18/24 1221    levothyroxine (Synthroid, Levoxyl) tablet 125 mcg, 125 mcg, oral, Daily, Jessica Blanco APRN-CNP, 125 mcg at 01/17/24 0535    lidocaine (Xylocaine) 10 mg/mL (1 %) injection 50 mg, 5 mL, infiltration, Once, OSBALDO Sue    lidocaine PF (Xylocaine) 20 mg/mL (2 %) injection 200 mg, 10 mL, subcutaneous, Once, OSBALDO Sue    ondansetron ODT (Zofran-ODT)  disintegrating tablet 4 mg, 4 mg, oral, q8h PRN **OR** ondansetron (Zofran) injection 4 mg, 4 mg, intravenous, q8h PRN, AMARILYS Sue-CNP, 4 mg at 01/18/24 0907    pantoprazole (ProtoNix) EC tablet 40 mg, 40 mg, oral, BID **OR** pantoprazole (ProtoNix) injection 40 mg, 40 mg, intravenous, BID, AMARILYS Sue-CNP, 40 mg at 01/18/24 2037    perflutren lipid microspheres (Definity) injection 1 mL of dilution, 1 mL of dilution, intravenous, Once in imaging, Jessica Blanco APRN-CNP    sevelamer carbonate (Renvela) tablet 2,400 mg, 2,400 mg, oral, TID with meals, Jessica Blanco APRN-CNP, 2,400 mg at 01/18/24 1222    sodium bicarbonate 1 mEq in lidocaine-epinephrine (Xylocaine W/EPI) 1 %-1:100,000 9 mL Syringe, 10 mL, subcutaneous, Once, Jessica Blanco APRN-CNP    vancomycin (Vancocin) placeholder, , miscellaneous, Daily PRN, AMARILYS Sue-CNP     Past Medical History  He has a past medical history of Essential (primary) hypertension (05/26/2017) and Personal history of other endocrine, nutritional and metabolic disease (05/26/2017).    Surgical History  He has a past surgical history that includes CT angio aorta and bilateral iliofemoral runoff w and or wo IV contrast (2/9/2023) and IR CVC exchange (11/13/2023).     Social History  He reports that he has been smoking cigarettes. He has a 3.75 pack-year smoking history. He has never used smokeless tobacco. No history on file for alcohol use and drug use.    Family History  Family History   Problem Relation Name Age of Onset    Other (DIABETES DUE TO UNDERLYING CONDITION WITH MICROALBUMINURIA) Father      Other (DIABETES DUE TO UNDERLYING CONDITION WITH MICROALBUMINURIA [Other]) Other AUNT     Other (DIABETES DUE TO UNDERLYING CONDITION WITH MICROALBUMINURIA [Other]) Other GP        Allergies  Cashew nut    Review of Systems   Respiratory:  Negative for shortness of breath.    Cardiovascular:  Negative for chest pain.   Endocrine:        As above  "        Last Recorded Vitals  Blood pressure 129/81, pulse 97, temperature 36.6 °C (97.9 °F), temperature source Temporal, resp. rate 18, height 1.753 m (5' 9\"), weight 70.3 kg (155 lb), SpO2 96 %.    Physical Exam  Constitutional:       General: He is not in acute distress.  Eyes:      Extraocular Movements: Extraocular movements intact.   Musculoskeletal:      Comments: Right arm amputation  Left AKA   Neurological:      Mental Status: He is alert.   Psychiatric:         Mood and Affect: Affect normal.          Relevant Results  Lab Results   Component Value Date    POCGLU 180 (H) 01/18/2024    POCGLU 172 (H) 01/18/2024    POCGLU 170 (H) 01/18/2024    POCGLU 197 (H) 01/18/2024    POCGLU 141 (H) 01/17/2024    GLUCOSE 252 (H) 01/19/2024    GLUCOSE 131 (H) 01/18/2024    GLUCOSE 295 (H) 01/17/2024    GLUCOSE 170 (H) 01/16/2024    GLUCOSE 143 (H) 01/15/2024      Latest Reference Range & Units 01/18/24 05:03 01/19/24 05:36   GLUCOSE 74 - 99 mg/dL 131 (H) 252 (H)   SODIUM 136 - 145 mmol/L 136 136   POTASSIUM 3.5 - 5.3 mmol/L 3.6 3.8   CHLORIDE 98 - 107 mmol/L 94 (L) 93 (L)   Bicarbonate 21 - 32 mmol/L 24 20 (L)   Anion Gap 10 - 20 mmol/L 22 (H) 27 (H)   Blood Urea Nitrogen 6 - 23 mg/dL 46 (H) 52 (H)   Creatinine 0.50 - 1.30 mg/dL 6.97 (H) 8.95 (H)   EGFR >60 mL/min/1.73m*2 10 (L) 7 (L)   Calcium 8.6 - 10.3 mg/dL 9.0 8.5 (L)   PHOSPHORUS 2.5 - 4.9 mg/dL 5.0 (H) 4.8   Albumin 3.4 - 5.0 g/dL 2.7 (L)  2.8 (L) 2.5 (L)   Alkaline Phosphatase 33 - 120 U/L 133 (H)    ALT 10 - 52 U/L 9 (L)    AST 9 - 39 U/L 12    Bilirubin Total 0.0 - 1.2 mg/dL 0.5    Bilirubin, Direct 0.0 - 0.3 mg/dL 0.2        IMPRESSION  TYPE 2 DIABETES MELLITUS  LONG TERM CURRENT INSULIN USE  Complicated by ESRD, amputations  Low insulin requirement  Denies history of DKA      RECOMMENDATIONS  Resume glargine 4 units at bedtime tonight  Continue lispro scale.           Mike Santana MD    "

## 2024-01-19 NOTE — CARE PLAN
The patient's goals for the shift include      The clinical goals for the shift include Patient will remain hemodynamically stable throughout shift.    Over the shift, the patient did not make progress toward the following goals. Barriers to progression include . Recommendations to address these barriers include .

## 2024-01-19 NOTE — INTERVAL H&P NOTE
H&P reviewed. The patient was examined and there are no changes to the H&P.    Plan for temp HD line placement today.

## 2024-01-19 NOTE — PROGRESS NOTES
"  INFECTIOUS DISEASE DAILY PROGRESS NOTE    SUBJECTIVE:    No overnight events. No new complaints. Afebrile.    OBJECTIVE:  VITALS (Last 24 Hours)  /78 (BP Location: Left arm, Patient Position: Lying)   Pulse 94   Temp 37 °C (98.6 °F) (Oral)   Resp 18   Ht 1.753 m (5' 9\")   Wt 70.3 kg (155 lb)   SpO2 95%   BMI 22.89 kg/m²     PHYSICAL EXAM:  Gen - NAD, in bed  Abd - mild distension, BS present  LLE - s/p AKA  Skin - no rash     ABX: IV Vanc    LABS:  Lab Results   Component Value Date    WBC 8.0 01/19/2024    HGB 7.6 (L) 01/19/2024    HCT 23.4 (L) 01/19/2024    MCV 80 01/19/2024     (L) 01/19/2024     Lab Results   Component Value Date    GLUCOSE 252 (H) 01/19/2024    CALCIUM 8.5 (L) 01/19/2024     01/19/2024    K 3.8 01/19/2024    CO2 20 (L) 01/19/2024    CL 93 (L) 01/19/2024    BUN 52 (H) 01/19/2024    CREATININE 8.95 (H) 01/19/2024     Results from last 72 hours   Lab Units 01/19/24  0536 01/18/24  0503   ALK PHOS U/L  --  133*   BILIRUBIN TOTAL mg/dL  --  0.5   BILIRUBIN DIRECT mg/dL  --  0.2   PROTEIN TOTAL g/dL  --  6.4   ALT U/L  --  9*   AST U/L  --  12   ALBUMIN g/dL 2.5* 2.7*  2.8*     Estimated Creatinine Clearance: 11 mL/min (A) (by C-G formula based on SCr of 8.95 mg/dL (H)).      ASSESSMENT/PLAN:     CLABSI (HD cath POA) due to Staph aureus - 3rd line infection in the last 3-4 months. HD cath removed 1/16. TTE limited but no endocarditis. Repeat blood cx 1/18 NGTD.  ESRD on HD     IV Vancomycin. POLI today. OK for temp HD cath. Will get tunneled cath once sure blood is cleared - maybe by Monday.     Will follow peripherally over the weekend. Please call me with questions. Thanks! D/w Dr. Carlos Jauregui MD  [facility]  Office #[phone]      "

## 2024-01-20 ENCOUNTER — APPOINTMENT (OUTPATIENT)
Dept: DIALYSIS | Facility: HOSPITAL | Age: 39
End: 2024-01-20
Payer: COMMERCIAL

## 2024-01-20 LAB
ALBUMIN SERPL BCP-MCNC: 2.5 G/DL (ref 3.4–5)
ANION GAP SERPL CALC-SCNC: 24 MMOL/L (ref 10–20)
BUN SERPL-MCNC: 58 MG/DL (ref 6–23)
CALCIUM SERPL-MCNC: 7.7 MG/DL (ref 8.6–10.3)
CHLORIDE SERPL-SCNC: 91 MMOL/L (ref 98–107)
CO2 SERPL-SCNC: 21 MMOL/L (ref 21–32)
CREAT SERPL-MCNC: 9.63 MG/DL (ref 0.5–1.3)
EGFRCR SERPLBLD CKD-EPI 2021: 6 ML/MIN/1.73M*2
ERYTHROCYTE [DISTWIDTH] IN BLOOD BY AUTOMATED COUNT: 20.9 % (ref 11.5–14.5)
GLUCOSE BLD MANUAL STRIP-MCNC: 176 MG/DL (ref 74–99)
GLUCOSE BLD MANUAL STRIP-MCNC: 211 MG/DL (ref 74–99)
GLUCOSE BLD MANUAL STRIP-MCNC: 277 MG/DL (ref 74–99)
GLUCOSE BLD MANUAL STRIP-MCNC: 462 MG/DL (ref 74–99)
GLUCOSE SERPL-MCNC: 516 MG/DL (ref 74–99)
HCT VFR BLD AUTO: 23.2 % (ref 41–52)
HGB BLD-MCNC: 7.2 G/DL (ref 13.5–17.5)
MCH RBC QN AUTO: 26.7 PG (ref 26–34)
MCHC RBC AUTO-ENTMCNC: 31 G/DL (ref 32–36)
MCV RBC AUTO: 86 FL (ref 80–100)
NRBC BLD-RTO: 0 /100 WBCS (ref 0–0)
PHOSPHATE SERPL-MCNC: 5.2 MG/DL (ref 2.5–4.9)
PLATELET # BLD AUTO: 172 X10*3/UL (ref 150–450)
POTASSIUM SERPL-SCNC: 4.2 MMOL/L (ref 3.5–5.3)
RBC # BLD AUTO: 2.7 X10*6/UL (ref 4.5–5.9)
SODIUM SERPL-SCNC: 132 MMOL/L (ref 136–145)
VANCOMYCIN SERPL-MCNC: 19.3 UG/ML (ref 5–20)
WBC # BLD AUTO: 6.4 X10*3/UL (ref 4.4–11.3)

## 2024-01-20 PROCEDURE — 1100000001 HC PRIVATE ROOM DAILY

## 2024-01-20 PROCEDURE — 2500000004 HC RX 250 GENERAL PHARMACY W/ HCPCS (ALT 636 FOR OP/ED): Performed by: NURSE PRACTITIONER

## 2024-01-20 PROCEDURE — 8010000001 HC DIALYSIS - HEMODIALYSIS PER DAY

## 2024-01-20 PROCEDURE — 85027 COMPLETE CBC AUTOMATED: CPT | Performed by: NURSE PRACTITIONER

## 2024-01-20 PROCEDURE — 2500000002 HC RX 250 W HCPCS SELF ADMINISTERED DRUGS (ALT 637 FOR MEDICARE OP, ALT 636 FOR OP/ED): Performed by: INTERNAL MEDICINE

## 2024-01-20 PROCEDURE — 2500000001 HC RX 250 WO HCPCS SELF ADMINISTERED DRUGS (ALT 637 FOR MEDICARE OP): Performed by: NURSE PRACTITIONER

## 2024-01-20 PROCEDURE — 80202 ASSAY OF VANCOMYCIN: CPT | Performed by: NURSE PRACTITIONER

## 2024-01-20 PROCEDURE — 82947 ASSAY GLUCOSE BLOOD QUANT: CPT

## 2024-01-20 PROCEDURE — 99232 SBSQ HOSP IP/OBS MODERATE 35: CPT | Performed by: INTERNAL MEDICINE

## 2024-01-20 PROCEDURE — 2500000004 HC RX 250 GENERAL PHARMACY W/ HCPCS (ALT 636 FOR OP/ED): Performed by: INTERNAL MEDICINE

## 2024-01-20 PROCEDURE — 2500000002 HC RX 250 W HCPCS SELF ADMINISTERED DRUGS (ALT 637 FOR MEDICARE OP, ALT 636 FOR OP/ED): Performed by: NURSE PRACTITIONER

## 2024-01-20 PROCEDURE — 80069 RENAL FUNCTION PANEL: CPT | Performed by: NURSE PRACTITIONER

## 2024-01-20 RX ORDER — INSULIN GLARGINE 100 [IU]/ML
8 INJECTION, SOLUTION SUBCUTANEOUS NIGHTLY
Status: DISCONTINUED | OUTPATIENT
Start: 2024-01-20 | End: 2024-01-21

## 2024-01-20 RX ORDER — HEPARIN SODIUM 1000 [USP'U]/ML
1400 INJECTION, SOLUTION INTRAVENOUS; SUBCUTANEOUS
Status: DISCONTINUED | OUTPATIENT
Start: 2024-01-20 | End: 2024-01-23 | Stop reason: HOSPADM

## 2024-01-20 RX ORDER — VANCOMYCIN HYDROCHLORIDE 750 MG/150ML
750 INJECTION, SOLUTION INTRAVENOUS ONCE
Status: DISCONTINUED | OUTPATIENT
Start: 2024-01-20 | End: 2024-01-21

## 2024-01-20 RX ADMIN — INSULIN GLARGINE 8 UNITS: 100 INJECTION, SOLUTION SUBCUTANEOUS at 21:12

## 2024-01-20 RX ADMIN — PANTOPRAZOLE SODIUM 40 MG: 40 TABLET, DELAYED RELEASE ORAL at 21:12

## 2024-01-20 RX ADMIN — HEPARIN SODIUM 5000 UNITS: 5000 INJECTION INTRAVENOUS; SUBCUTANEOUS at 21:11

## 2024-01-20 RX ADMIN — SEVELAMER CARBONATE 2400 MG: 800 TABLET, FILM COATED ORAL at 16:37

## 2024-01-20 RX ADMIN — INSULIN LISPRO 4 UNITS: 100 INJECTION, SOLUTION INTRAVENOUS; SUBCUTANEOUS at 16:38

## 2024-01-20 RX ADMIN — SEVELAMER CARBONATE 2400 MG: 800 TABLET, FILM COATED ORAL at 13:58

## 2024-01-20 RX ADMIN — SEVELAMER CARBONATE 2400 MG: 800 TABLET, FILM COATED ORAL at 08:22

## 2024-01-20 RX ADMIN — HEPARIN SODIUM 1400 UNITS: 1000 INJECTION INTRAVENOUS; SUBCUTANEOUS at 13:02

## 2024-01-20 RX ADMIN — INSULIN LISPRO 2 UNITS: 100 INJECTION, SOLUTION INTRAVENOUS; SUBCUTANEOUS at 13:58

## 2024-01-20 RX ADMIN — HEPARIN SODIUM 1400 UNITS: 1000 INJECTION INTRAVENOUS; SUBCUTANEOUS at 13:03

## 2024-01-20 RX ADMIN — INSULIN LISPRO 10 UNITS: 100 INJECTION, SOLUTION INTRAVENOUS; SUBCUTANEOUS at 08:22

## 2024-01-20 RX ADMIN — Medication 1 CAPSULE: at 08:22

## 2024-01-20 RX ADMIN — HEPARIN SODIUM 5000 UNITS: 5000 INJECTION INTRAVENOUS; SUBCUTANEOUS at 13:59

## 2024-01-20 RX ADMIN — PANTOPRAZOLE SODIUM 40 MG: 40 TABLET, DELAYED RELEASE ORAL at 08:22

## 2024-01-20 ASSESSMENT — COGNITIVE AND FUNCTIONAL STATUS - GENERAL
DRESSING REGULAR UPPER BODY CLOTHING: A LOT
TOILETING: TOTAL
PERSONAL GROOMING: TOTAL
DRESSING REGULAR LOWER BODY CLOTHING: TOTAL
MOBILITY SCORE: 11
PERSONAL GROOMING: TOTAL
DAILY ACTIVITIY SCORE: 10
MOBILITY SCORE: 11
MOVING TO AND FROM BED TO CHAIR: A LOT
MOVING FROM LYING ON BACK TO SITTING ON SIDE OF FLAT BED WITH BEDRAILS: A LITTLE
HELP NEEDED FOR BATHING: TOTAL
CLIMB 3 TO 5 STEPS WITH RAILING: TOTAL
DRESSING REGULAR LOWER BODY CLOTHING: TOTAL
STANDING UP FROM CHAIR USING ARMS: TOTAL
CLIMB 3 TO 5 STEPS WITH RAILING: TOTAL
TURNING FROM BACK TO SIDE WHILE IN FLAT BAD: A LITTLE
TURNING FROM BACK TO SIDE WHILE IN FLAT BAD: A LITTLE
MOVING TO AND FROM BED TO CHAIR: A LOT
MOVING FROM LYING ON BACK TO SITTING ON SIDE OF FLAT BED WITH BEDRAILS: A LITTLE
HELP NEEDED FOR BATHING: TOTAL
DAILY ACTIVITIY SCORE: 10
WALKING IN HOSPITAL ROOM: TOTAL
WALKING IN HOSPITAL ROOM: TOTAL
TOILETING: TOTAL
STANDING UP FROM CHAIR USING ARMS: TOTAL
DRESSING REGULAR UPPER BODY CLOTHING: A LOT

## 2024-01-20 ASSESSMENT — PAIN SCALES - GENERAL
PAINLEVEL_OUTOF10: 0 - NO PAIN

## 2024-01-20 ASSESSMENT — PAIN - FUNCTIONAL ASSESSMENT
PAIN_FUNCTIONAL_ASSESSMENT: 0-10
PAIN_FUNCTIONAL_ASSESSMENT: 0-10

## 2024-01-20 NOTE — PROGRESS NOTES
Patient had temporary HD catheter placed on Friday.  Possible tunneled cath placed on Monday 1/20/24.

## 2024-01-20 NOTE — NURSING NOTE
Report to Receiving RN:    Report To: Keyla  Time Report Called: 1300  Hand-Off Communication: Weight removql of 1 kilo and last /81  Complications During Treatment: No  Ultrafiltration Treatment: No  Medications Administered During Dialysis: No  Blood Products Administered During Dialysis: No  Labs Sent During Dialysis: No  Heparin Drip Rate Changes: No    Electronic Signatures:  Delilah Sams RN (Signed )      Last Updated: 1:03 PM by DELILAH SAMS

## 2024-01-20 NOTE — PROGRESS NOTES
"Vancomycin Dosing by Pharmacy- FOLLOW UP    Edwar Hemphill is a 38 y.o. year old male who Pharmacy has been consulted for vancomycin dosing for pneumonia. Based on the patient's indication and renal status this patient is being dosed based on a goal trough/random level of 15-20.     Renal function is currently stable.    Current vancomycin dose: dosed by level     Most recent random level: 19.3 mcg/mL    Visit Vitals  /83 (BP Location: Left arm, Patient Position: Lying)   Pulse 93   Temp 36.6 °C (97.9 °F) (Temporal)   Resp 17        Lab Results   Component Value Date    CREATININE 9.63 (H) 01/20/2024    CREATININE 8.95 (H) 01/19/2024    CREATININE 6.97 (H) 01/18/2024    CREATININE 6.42 (H) 01/17/2024        Patient weight is No results found for: \"PTWEIGHT\"    No results found for: \"CULTURE\"     I/O last 3 completed shifts:  In: 300 (4.3 mL/kg) [I.V.:300 (4.3 mL/kg)]  Out: 0 (0 mL/kg)   Weight: 70.3 kg   [unfilled]    Lab Results   Component Value Date    PATIENTTEMP 37.0 01/14/2024    PATIENTTEMP 37.0 07/09/2023    PATIENTTEMP 37.0 07/08/2023    PATIENTTEMP 37.0 07/08/2023        Assessment/Plan    Within goal random/trough level. Re-dose vancomycin 750 mg x1 today after HD  The next level will be obtained on 1/22 with am labs, prior to the next HD session. May be obtained sooner if clinically indicated.   Will continue to monitor renal function daily while on vancomycin and order serum creatinine at least every 48 hours if not already ordered.  Follow for continued vancomycin needs, clinical response, and signs/symptoms of toxicity.       Noemí Hernandez RP           "

## 2024-01-20 NOTE — PROGRESS NOTES
Xiomara Hemphill is a 38 y.o. male on day 5 of admission presenting with Septic shock (CMS/HCC).      Subjective   S/p POLI       Objective     Last Recorded Vitals  /74 (BP Location: Left arm)   Pulse 105   Temp 36.4 °C (97.5 °F) (Temporal)   Resp 18   Wt 70.3 kg (155 lb)   SpO2 97%   Intake/Output last 3 Shifts:    Intake/Output Summary (Last 24 hours) at 1/19/2024 2046  Last data filed at 1/19/2024 1345  Gross per 24 hour   Intake 300 ml   Output 0 ml   Net 300 ml       Admission Weight  Weight: 79.4 kg (175 lb) (01/14/24 1258)    Daily Weight  01/19/24 : 70.3 kg (155 lb)    Image Results  Transesophageal Echo (POLI)     [facility], [address] [place] [address]               Tel [phone] and Fax [phone]    TRANSESOPHAGEAL ECHOCARDIOGRAM REPORT       Patient Name:      XIOMARA HEMPHILL         Reading Physician:    [id number] Luigi Bach MD  Study Date:        1/19/2024            Ordering Provider:    [id number] KIERRA RAY  MRN/PID:           [id number]             Fellow:  Accession#:        [id number]         Nurse:                Ronaldo Woo RN  Date of Birth/Age: 1985 / 38 years Sonographer:          Alejandro Edouard RDCS  Gender:            M                    Additional Staff:     Noah PARRA  Height:            175.26 cm            Admit Date:           1/14/2024  Weight:            70.31 kg             Admission Status:     Inpatient -                                                                Routine  BSA:               1.85 m2              Encounter#:           [id number]                                          Department Location:  [facility] Cath                                                                Lab  Blood Pressure: 130 /68 mmHg    Study Type:     TRANSESOPHAGEAL ECHO (POLI)  Diagnosis/ICD: Bacteremia-R78.81  Indication:    Bacteremia  CPT Code:      POLI Complete-66800; Doppler Limited-35819; Color Doppler-76638    Patient History:  Allergies:         Cashew Nut.  Smoker:            Current.  Diabetes:          Yes Insulin  Pertinent History: HTN. 38 y.o. male presents for POLI for bacteremia.    Study Detail: The following Echo studies were performed: 2D. Agitated saline                used as a contrast agent for intraseptal flow evaluation. Total                contrast used for this procedure was 10 mL via IV push. Patient's                heart rhythm is sinus tachycardia and normal sinus rhythm. The                patient was sedated.       PHYSICIAN INTERPRETATION:  POLI Details: The POLI probe used was 5177. Technically adequate omniplane transesophageal echocardiogram performed.  POLI Medication: The pharynx was anesthetized with Cetacaine spray and viscous lidocaine. The patient was sedated by Anesthesia; please refer to anesthesia flow sheet for medications used.  POLI Procedure: The probe was passed without difficulty. The patient tolerated the procedure well without any apparent complications.  Left Ventricle: The left ventricular systolic function is normal, with an estimated ejection fraction of 60%. There are no regional wall motion abnormalities. The left ventricular cavity size is normal. Left ventricular diastolic filling was not assessed.  Left Atrium: The left atrium is normal in size. A bubble study using agitated saline was performed. Bubble study is negative. There is no thrombus visualized in the left atrial appendage and the left atrial appendage Doppler velocities are mildly reduced.  Right Ventricle: The right ventricle is normal in size. There is normal right ventricular global systolic function.  Right Atrium: The right atrium is normal in size.  Aortic Valve: The aortic valve is trileaflet. There is no evidence of aortic valve  regurgitation.  Mitral Valve: The mitral valve is normal in structure. There is no evidence of mitral valve regurgitation.  Tricuspid Valve: The tricuspid valve is structurally normal. There is trace tricuspid regurgitation.  Pulmonic Valve: The pulmonic valve is structurally normal. There is no indication of pulmonic valve regurgitation.  Pericardium: There is no pericardial effusion noted.  Aorta: The aortic root is normal. There is no plaque visualized in the descending aorta.       CONCLUSIONS:   1. Left ventricular systolic function is normal with a 60% estimated ejection fraction.   2. No definite valvular vegetations were visualized.    QUANTITATIVE DATA SUMMARY:     [id number] Luigi Bach MD  Electronically signed on 1/19/2024 at 7:32:42 PM       ** Final **  IR CVC nontunneled  Narrative: Interpreted By:  Ronaldo Dempsey,   STUDY:  IR CVC NONTUNNELED; ;  1/19/2024 12:31 pm  1. ULTRASOUND-GUIDED LEFT GROIN VENOUS ACCESS  2. LEFT COMMON ILIAC AND INFERIOR VENA CAVAGRAM  3. ULTRASOUND FLUOROSCOPICALLY GUIDED LEFT GROIN TEMPORARY  HEMODIALYSIS CATHETER          INDICATION:  Signs/Symptoms:temp HD cath placement. 38-year-old man end-stage  renal disease, prior right groin tunneled hemodialysis catheter,  bacteremia necessitating line holiday. He requires temporary  hemodialysis access.      COMPARISON:  CT chest abdomen pelvis 01/14/2024      ACCESSION NUMBER(S):  [id number]      ORDERING CLINICIAN:  ALKA VIEYRA      TECHNIQUE:      ATTENDING : Ronaldo Dempsey M.D.      TECHNICAL DESCRIPTION/FINDINGS: The procedure, including all risks,  benefits and alternatives were explained to the patient in detail.  All questions were answered and written informed consent was obtained.      The patient was positioned supine on the fluoroscopy table. A  time-out was performed.      The bilateral groins were prepped and draped in usual sterile  fashion. Focused ultrasound was performed of the bilateral  common  femoral veins demonstrating intraluminal echogenicity with small  calcification indicating chronic clot. Ultrasound images were saved.      The decision was made to place the temporary catheter in the left  contralateral access as the recently infected catheter. Focused  ultrasound was performed of the left common femoral vein  demonstrating intraluminal echogenicity and partial compressibility.  Ultrasound images were saved. 1% lidocaine was administered  subcutaneously for local anesthesia. Under real-time ultrasound  guidance, a 21 gauge access needle was advanced into the intraluminal  thrombus. Ultrasound images were saved. Numerous attempts were made  to pass an 018 guidewire centrally through the thrombus into the  venous lumen without success. This was attempted multiple times and  was unsuccessful.      A small left great saphenous venous collateral was then identified in  the more inferomedial left thigh and was identified as patent and  compressible. Ultrasound images were saved. 1% lidocaine was  administered subcutaneously for local anesthesia. Under real-time  ultrasound guidance, a 21 gauge access needle was utilized to access  this left great saphenous venous collateral using micropuncture  technique. Ultrasound images were saved. There is spontaneous return  of blood through the micropuncture needle. An 018 guidewire was then  advanced centrally and was shown to course along the left common  femoral vein, and subsequently the external and common iliac veins.  Over the access wire, a 4 Mongolian micropuncture sheath was placed.      An initial left external iliac venogram was performed demonstrating  antegrade flow through the left external and common iliac veins. A  subsequent DSA was performed through the micropuncture sheath  demonstrating patency of the inferior vena cava with multiple venous  webs and stenosis at both the L1-L2 intervertebral space, and the  inferior cavoatrial junction.       An 035 guidewire was then placed through the micropuncture sheath and  after serial dilation, a 13 Czech by 20 cm Trialysis temporary  hemodialysis catheter was placed. A completion fluoroscopic image  demonstrates the catheter in the left common iliac vein.      Catheter lumens easily aspirated and flushed. Large-bore dialysis  lumens were locked with a concentrated heparinized solution (1000  units/cc). The small bore 3rd lumen was locked with a dilute  heparinized solution. The catheter hub was sutured to the skin with 2  0 Prolene stay suture. A sterile dressing was applied.      SEDATION/MEDICATIONS: Continuous cardiopulmonary monitoring was  performed by a radiology nurse for the duration of the procedure.  1  mg Versed and   50 mcg Fentanyl were administered for moderate  conscious sedation time of 30 minutes.      10 cc 1% Lidocaine was  administered subcutaneously for local anesthesia. SPECIMENS: None.  ESTIMATED BLOOD LOSS:  5 cc  FLOUROSCOPY:  1.7 minutes; DAP  59907 mGy-cm*2; Air Kerma  68.37 mGy  CONTRAST: 10 cc Omnipaque 350      FINDINGS:  Test      Impression: 1. 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  2. Ultrasound and fluoroscopically guided left groin temporary  hemodialysis catheter, ready for use.      PLAN:  Consider conversion of the left groin temporary access to semi  permanent tunneled venous access when clinically appropriate, versus  replacement of a tunneled hemodialysis catheter in the right groin      MACRO:  None      Signed by: Ronaldo Dempsey 1/19/2024 3:36 PM  Dictation workstation:   [id number]      PHYSICAL EXAM  NEUROLOGICAL: No abnormal movements, no changes from before  HEENT: Head atraumatic, perrl,eomi, no oral lesions, no ear rash   NECK: Supple, no ln, jvp flat, thyroid palp  HEART: S1, S2, no added sound  LUNGS: CTAB, no crackles, no rales, no wheezing, no dullness to percussion, sym exp  EXTREMITIES: no edema  ABDOMEN: Soft, nontender,  bowel sound positive, no organomegaly  SKIN: No change  EYES: No changes, perrl, eomi,   ENT: No change    JOINT: No change  Relevant Results               Assessment/Plan        This patient has a central line   Reason for the central line remaining today? Dialysis/Hemapheresis      Esrd  Pvd  Nausea better      Principal Problem:    Septic shock (CMS/HCC)    t35              Mauricio Estrada MD

## 2024-01-20 NOTE — PROGRESS NOTES
"Edwar Hemphill is a 38 y.o. male on day 6 of admission presenting with Septic shock (CMS/HCC).    Subjective   Patient has not yet eaten lunch. He confirms taking 10 units of Lantus in the outpatient setting.     I have reviewed histories, allergies and medications have been reviewed and there are no changes       Objective     Physical Exam  Vitals and nursing note reviewed.   Constitutional:       General: He is not in acute distress.     Appearance: Normal appearance. He is normal weight.   HENT:      Head: Normocephalic and atraumatic.      Nose: Nose normal.      Mouth/Throat:      Mouth: Mucous membranes are moist.   Eyes:      Extraocular Movements: Extraocular movements intact.   Pulmonary:      Effort: Pulmonary effort is normal.   Musculoskeletal:         General: Normal range of motion.   Neurological:      Mental Status: He is alert and oriented to person, place, and time.   Psychiatric:         Mood and Affect: Mood normal.         Last Recorded Vitals  Blood pressure 135/83, pulse 93, temperature 36.6 °C (97.9 °F), temperature source Temporal, resp. rate 17, height 1.753 m (5' 9\"), weight 70.3 kg (155 lb), SpO2 94 %.  Intake/Output last 3 Shifts:  I/O last 3 completed shifts:  In: 300 (4.3 mL/kg) [I.V.:300 (4.3 mL/kg)]  Out: 0 (0 mL/kg)   Weight: 70.3 kg     Relevant Results  Results from last 7 days   Lab Units 01/20/24  0806 01/20/24  0620 01/19/24  2149 01/19/24  1651 01/19/24  1018 01/19/24  0536 01/18/24 2008 01/18/24  0812 01/18/24  0503 01/17/24  0850 01/17/24  0541 01/16/24  0903 01/16/24  0355   POCT GLUCOSE mg/dL 462*  --  351* 353* 332*  --  180*   < >  --    < >  --    < >  --    GLUCOSE mg/dL  --  516*  --   --   --  252*  --   --  131*  --  295*  --  170*    < > = values in this interval not displayed.     Scheduled medications  B complex-vitamin C-folic acid, 1 capsule, oral, Daily  epoetin tyson or biosimilar, 10,000 Units, subcutaneous, Once per day on Mon Wed Fri  heparin (porcine), " 5,000 Units, subcutaneous, q8h  insulin glargine, 4 Units, subcutaneous, Nightly  insulin lispro, 0-10 Units, subcutaneous, TID with meals  levothyroxine, 125 mcg, oral, Daily  lidocaine, 5 mL, infiltration, Once  lidocaine PF, 10 mL, subcutaneous, Once  pantoprazole, 40 mg, oral, BID   Or  pantoprazole, 40 mg, intravenous, BID  perflutren lipid microspheres, 1 mL of dilution, intravenous, Once in imaging  sevelamer carbonate, 2,400 mg, oral, TID with meals  sodium bicarbonate 1 mEq in lidocaine-epinephrine (Xylocaine W/EPI) 1 %-1:100,000 9 mL Syringe, 10 mL, subcutaneous, Once  vancomycin, 750 mg, intravenous, Once      Continuous medications     PRN medications  PRN medications: acetaminophen, bisacodyl, dextrose 10 % in water (D10W), dextrose, fentaNYL PF, glucagon, heparin flush, iohexol, lidocaine, midazolam, ondansetron ODT **OR** ondansetron, oxygen, vancomycin    Results for orders placed or performed during the hospital encounter of 01/14/24 (from the past 24 hour(s))   POCT GLUCOSE   Result Value Ref Range    POCT Glucose 353 (H) 74 - 99 mg/dL   POCT GLUCOSE   Result Value Ref Range    POCT Glucose 351 (H) 74 - 99 mg/dL   Renal Function Panel   Result Value Ref Range    Glucose 516 (HH) 74 - 99 mg/dL    Sodium 132 (L) 136 - 145 mmol/L    Potassium 4.2 3.5 - 5.3 mmol/L    Chloride 91 (L) 98 - 107 mmol/L    Bicarbonate 21 21 - 32 mmol/L    Anion Gap 24 (H) 10 - 20 mmol/L    Urea Nitrogen 58 (H) 6 - 23 mg/dL    Creatinine 9.63 (H) 0.50 - 1.30 mg/dL    eGFR 6 (L) >60 mL/min/1.73m*2    Calcium 7.7 (L) 8.6 - 10.3 mg/dL    Phosphorus 5.2 (H) 2.5 - 4.9 mg/dL    Albumin 2.5 (L) 3.4 - 5.0 g/dL   CBC   Result Value Ref Range    WBC 6.4 4.4 - 11.3 x10*3/uL    nRBC 0.0 0.0 - 0.0 /100 WBCs    RBC 2.70 (L) 4.50 - 5.90 x10*6/uL    Hemoglobin 7.2 (L) 13.5 - 17.5 g/dL    Hematocrit 23.2 (L) 41.0 - 52.0 %    MCV 86 80 - 100 fL    MCH 26.7 26.0 - 34.0 pg    MCHC 31.0 (L) 32.0 - 36.0 g/dL    RDW 20.9 (H) 11.5 - 14.5 %     Platelets 172 150 - 450 x10*3/uL   Vancomycin   Result Value Ref Range    Vancomycin 19.3 5.0 - 20.0 ug/mL   POCT GLUCOSE   Result Value Ref Range    POCT Glucose 462 (H) 74 - 99 mg/dL      Transesophageal Echo (POLI)    Result Date: 1/19/2024   [facility], [address] [place] [address]              Tel [phone] and Fax [phone] TRANSESOPHAGEAL ECHOCARDIOGRAM REPORT  Patient Name:      XIOMARA Maya Physician:    [id number] Luigi Bach MD Study Date:        1/19/2024            Ordering Provider:    [id number] KIERRA RAY MRN/PID:           [id number]             Fellow: Accession#:        [id number]         Nurse:                Ronaldo Woo RN Date of Birth/Age: 1985 / 38 years Sonographer:          Alejandro Edouard RDCS Gender:            M                    Additional Staff:     Noah PARRA Height:            175.26 cm            Admit Date:           1/14/2024 Weight:            70.31 kg             Admission Status:     Inpatient -                                                               Routine BSA:               1.85 m2              Encounter#:           [id number]                                         Department Location:  [facility] Cath                                                               Lab Blood Pressure: 130 /68 mmHg Study Type:    TRANSESOPHAGEAL ECHO (POLI) Diagnosis/ICD: Bacteremia-R78.81 Indication:    Bacteremia CPT Code:      POLI Complete-54817; Doppler Limited-62612; Color Doppler-05730 Patient History: Allergies:         Cashew Nut. Smoker:            Current. Diabetes:          Yes Insulin Pertinent History: HTN. 38 y.o. male presents for POLI for bacteremia. Study Detail: The following Echo studies were performed: 2D. Agitated saline                used as a contrast agent for intraseptal flow evaluation. Total               contrast used for this procedure was 10 mL via IV push. Patient's               heart rhythm is sinus tachycardia and normal sinus rhythm. The               patient was sedated.  PHYSICIAN INTERPRETATION: POLI Details: The POLI probe used was 5177. Technically adequate omniplane transesophageal echocardiogram performed. POLI Medication: The pharynx was anesthetized with Cetacaine spray and viscous lidocaine. The patient was sedated by Anesthesia; please refer to anesthesia flow sheet for medications used. POLI Procedure: The probe was passed without difficulty. The patient tolerated the procedure well without any apparent complications. Left Ventricle: The left ventricular systolic function is normal, with an estimated ejection fraction of 60%. There are no regional wall motion abnormalities. The left ventricular cavity size is normal. Left ventricular diastolic filling was not assessed. Left Atrium: The left atrium is normal in size. A bubble study using agitated saline was performed. Bubble study is negative. There is no thrombus visualized in the left atrial appendage and the left atrial appendage Doppler velocities are mildly reduced. Right Ventricle: The right ventricle is normal in size. There is normal right ventricular global systolic function. Right Atrium: The right atrium is normal in size. Aortic Valve: The aortic valve is trileaflet. There is no evidence of aortic valve regurgitation. Mitral Valve: The mitral valve is normal in structure. There is no evidence of mitral valve regurgitation. Tricuspid Valve: The tricuspid valve is structurally normal. There is trace tricuspid regurgitation. Pulmonic Valve: The pulmonic valve is structurally normal. There is no indication of pulmonic valve regurgitation. Pericardium: There is no pericardial effusion noted. Aorta: The aortic root is normal. There is no plaque visualized in the  descending aorta.  CONCLUSIONS:  1. Left ventricular systolic function is normal with a 60% estimated ejection fraction.  2. No definite valvular vegetations were visualized. QUANTITATIVE DATA SUMMARY:  [id number] Luigi Bach MD Electronically signed on 1/19/2024 at 7:32:42 PM  ** Final **     IR CVC nontunneled    Result Date: 1/19/2024  Interpreted By:  Ronaldo Dempsey, STUDY: IR CVC NONTUNNELED; ;  1/19/2024 12:31 pm 1. ULTRASOUND-GUIDED LEFT GROIN VENOUS ACCESS 2. LEFT COMMON ILIAC AND INFERIOR VENA CAVAGRAM 3. ULTRASOUND FLUOROSCOPICALLY GUIDED LEFT GROIN TEMPORARY HEMODIALYSIS CATHETER     INDICATION: Signs/Symptoms:temp HD cath placement. 38-year-old man end-stage renal disease, prior right groin tunneled hemodialysis catheter, bacteremia necessitating line holiday. He requires temporary hemodialysis access.   COMPARISON: CT chest abdomen pelvis 01/14/2024   ACCESSION NUMBER(S): [id number]   ORDERING CLINICIAN: ALKA VIEYRA   TECHNIQUE:   ATTENDING : Ronaldo Dempsey M.D.   TECHNICAL DESCRIPTION/FINDINGS: The procedure, including all risks, benefits and alternatives were explained to the patient in detail. All questions were answered and written informed consent was obtained.   The patient was positioned supine on the fluoroscopy table. A time-out was performed.   The bilateral groins were prepped and draped in usual sterile fashion. Focused ultrasound was performed of the bilateral common femoral veins demonstrating intraluminal echogenicity with small calcification indicating chronic clot. Ultrasound images were saved.   The decision was made to place the temporary catheter in the left contralateral access as the recently infected catheter. Focused ultrasound was performed of the left common femoral vein demonstrating intraluminal echogenicity and partial compressibility. Ultrasound images were saved. 1% lidocaine was administered subcutaneously for local anesthesia. Under real-time ultrasound  guidance, a 21 gauge access needle was advanced into the intraluminal thrombus. Ultrasound images were saved. Numerous attempts were made to pass an 018 guidewire centrally through the thrombus into the venous lumen without success. This was attempted multiple times and was unsuccessful.   A small left great saphenous venous collateral was then identified in the more inferomedial left thigh and was identified as patent and compressible. Ultrasound images were saved. 1% lidocaine was administered subcutaneously for local anesthesia. Under real-time ultrasound guidance, a 21 gauge access needle was utilized to access this left great saphenous venous collateral using micropuncture technique. Ultrasound images were saved. There is spontaneous return of blood through the micropuncture needle. An 018 guidewire was then advanced centrally and was shown to course along the left common femoral vein, and subsequently the external and common iliac veins. Over the access wire, a 4 Ethiopian micropuncture sheath was placed.   An initial left external iliac venogram was performed demonstrating antegrade flow through the left external and common iliac veins. A subsequent DSA was performed through the micropuncture sheath demonstrating patency of the inferior vena cava with multiple venous webs and stenosis at both the L1-L2 intervertebral space, and the inferior cavoatrial junction.   An 035 guidewire was then placed through the micropuncture sheath and after serial dilation, a 13 Ethiopian by 20 cm Trialysis temporary hemodialysis catheter was placed. A completion fluoroscopic image demonstrates the catheter in the left common iliac vein.   Catheter lumens easily aspirated and flushed. Large-bore dialysis lumens were locked with a concentrated heparinized solution (1000 units/cc). The small bore 3rd lumen was locked with a dilute heparinized solution. The catheter hub was sutured to the skin with 2 0 Prolene stay suture. A sterile  dressing was applied.   SEDATION/MEDICATIONS: Continuous cardiopulmonary monitoring was performed by a radiology nurse for the duration of the procedure.  1 mg Versed and   50 mcg Fentanyl were administered for moderate conscious sedation time of 30 minutes.      10 cc 1% Lidocaine was administered subcutaneously for local anesthesia. SPECIMENS: None. ESTIMATED BLOOD LOSS:  5 cc FLOUROSCOPY:  1.7 minutes; DAP  36711 mGy-cm*2; Air Kerma  68.37 mGy CONTRAST: 10 cc Omnipaque 350   FINDINGS: Test       1. 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 2. Ultrasound and fluoroscopically guided left groin temporary hemodialysis catheter, ready for use.   PLAN: Consider conversion of the left groin temporary access to semi permanent tunneled venous access when clinically appropriate, versus replacement of a tunneled hemodialysis catheter in the right groin   MACRO: None   Signed by: Ronaldo Dempsey 1/19/2024 3:36 PM Dictation workstation:   [id number]    NM hepatobiliary    Result Date: 1/18/2024  Interpreted By:  Nessa Ayala and Meyers Emily STUDY: NM HEPATOBILIARY;  1/18/2024 9:33 am   INDICATION: Signs/Symptoms:gallbladder thickening.   COMPARISON: CT chest abdomen pelvis 01/14/2024, gallbladder ultrasound 01/14/2024   ACCESSION NUMBER(S): [id number]   ORDERING CLINICIAN: KIERRA RAY   TECHNIQUE: DIVISION OF NUCLEAR MEDICINE HEPATOBILIARY SCAN (HIDA)   The patient received an intravenous dose of  5.5 mCi of Tc-99m mebrofenin (Choletec). A single static image was obtained at 9:05 a.m..   FINDINGS: Upon initial attempts at imaging, IV was noted to be infiltrated. Patient was then sent back to his room until a new IV line could be placed. After successful placement of a new IV line, a single static image was obtained prior to reinjection.   A single static image obtained at 9:05 a.m. demonstrates radiotracer uptake within the liver, gallbladder, and small bowel.       1. Limited examination  secondary to technical difficulties with a single static image approximately 90 minutes post-injection with radiotracer uptake within the liver, gallbladder, and small bowel, findings exclude acute cholecystitis.   I personally reviewed the images/study, and I agree with the findings as stated above. This study was interpreted at [place], [place].   MACRO: None   Signed by: Nessa Ayala 1/18/2024 9:53 AM Dictation workstation:   [id number]    NM injection no scan    Result Date: 1/18/2024  These images are not reportable by radiology and will not be interpreted by  Radiologists.    XR abdomen 1 view    Result Date: 1/17/2024  Interpreted By:  Dean Stanton, STUDY: XR ABDOMEN 1 VIEW;  1/17/2024 11:00 am   INDICATION: Signs/Symptoms:abd pain.   COMPARISON: None.   ACCESSION NUMBER(S): [id number]   ORDERING CLINICIAN: BRADEN QUIROGA   FINDINGS: A KUB is obtained. A nonspecific nonobstructive bowel gas pattern. Air-filled distended stomach. Apparent osteopenia.       1. A nonspecific, nonobstructive bowel gas pattern.   MACRO: None   Signed by: Dean Stanton 1/17/2024 11:29 AM Dictation workstation:   [id number]    Transthoracic Echo (TTE) Complete    Result Date: 1/16/2024   [facility], [address] [place] [address]              Tel [phone] and Fax [phone] TRANSTHORACIC ECHOCARDIOGRAM REPORT  Patient Name:     XIOMARA Maya Physician:  [id number] Edward Hernandez DO Study Date:       1/16/2024           Ordering Provider:  [id number] MONTES MRN/PID:          [id number]            Fellow: Accession#:       [id number]        Nurse: Date of           1985 / 38      Sonographer:        Sonja Gonzalez RDCS Birth/Age:        years Gender:           M                   Additional Staff: Height:           175.00 cm           Admit Date:         1/14/2024 Weight:            72.00 kg            Admission Status:   Inpatient - Routine BSA:              1.87 m2             Encounter#:         [id number]                                       Department          [facility] ICU                                       Location: Blood Pressure: 110 /69 mmHg Study Type:    TRANSTHORACIC ECHO (TTE) COMPLETE Diagnosis/ICD: Bacteremia-R78.81 Indication:    Bacteremia Patient History: Pertinent History: HTN, Hyperlipidemia and PVD. Study Detail: The following Echo studies were performed: 2D, M-Mode, Doppler and               color flow. Image quality for this study is poor. Technically               challenging study due to body habitus and patient lying in supine               position. Definity used as a contrast agent for endocardial border               definition. Total contrast used for this procedure was 3 mL via IV               push.  PHYSICIAN INTERPRETATION: Left Ventricle: The left ventricular systolic function is normal, with an estimated ejection fraction of 65-70%. The calculated ejection fraction is normal at 68 % using the Briseno's Bi-plane MOD calculation. There are no regional wall motion abnormalities. The left ventricular cavity size is normal. The left ventricular septal wall thickness is normal. There is normal left ventricular posterior wall thickness. Spectral Doppler shows an impaired relaxation pattern of left ventricular diastolic filling. There is an elevated mean left atrial pressure. There is no definite left ventricular thrombus visualized. Left Atrium: The left atrium was not assessed. Right Ventricle: The right ventricle was not well visualized. There is normal right ventricular global systolic function. Right Atrium: The right atrium is normal in size. Aortic Valve: The aortic valve is trileaflet. There is mild aortic valve cusp calcification. Aortic valve regurgitation was not assessed. The peak instantaneous gradient of the aortic valve is 3.5 mmHg. Mitral Valve: The  mitral valve is mildly thickened. Mitral valve regurgitation was not assessed. Tricuspid Valve: The tricuspid valve is structurally normal. There is trace tricuspid regurgitation. The Doppler estimated RVSP is within normal limits at 15.7 mmHg. Pulmonic Valve: The pulmonic valve is not well visualized. Pulmonic valve regurgitation was not assessed. Pericardium: Pericardial effusion was not assessed. Aorta: The aortic root was not assessed. Systemic Veins: The inferior vena cava size appears small. In comparison to the previous echocardiogram(s): Compared with study from 11/14/2023,. Again, poor echocardiographic imaging. Within the limitations of the study, valvular anatomy and function appear similar. There has not been progression of valvular regurgitation. Study however is not of sufficient quality to fully exclude active endocarditis. Consider POLI based on clinical suspicion.  CONCLUSIONS:  1. Poorly visualized anatomical structures due to suboptimal image quality.  2. Left ventricular systolic function is normal with a 65-70% estimated ejection fraction.  3. Spectral Doppler shows an impaired relaxation pattern of left ventricular diastolic filling.  4. No left ventricular thrombus visualized.  5. There is an elevated mean left atrial pressure.  6. RVSP within normal limits.  7. Again, poor echocardiographic imaging. Within the limitations of the study, valvular anatomy and function appear similar. There has not been progression of valvular regurgitation. Study however is not of sufficient quality to fully exclude active endocarditis. Consider POLI based on clinical suspicion. RECOMMENDATIONS: Transthoracic echo has low negative predictive value for mass, vegetation, or embolic source. Consider POLI or MRI if clinically indicated.  QUANTITATIVE DATA SUMMARY: 2D MEASUREMENTS:                          Normal Ranges: IVSd:          0.90 cm   (0.6-1.1cm) LVPWd:         0.90 cm   (0.6-1.1cm) LVIDd:         4.20 cm    (3.9-5.9cm) LVIDs:         2.70 cm LV Mass Index: 63.4 g/m2 LV % FS        35.7 % LV SYSTOLIC FUNCTION BY 2D PLANIMETRY (MOD):                     Normal Ranges: EF-A4C View: 67.4 % (>=55%) EF-A2C View: 68.3 % EF-Biplane:  68.1 % LV DIASTOLIC FUNCTION:                               Normal Ranges: MV Peak E:        0.42 m/s    (0.7-1.2 m/s) MV Peak A:        0.61 m/s    (0.42-0.7 m/s) E/A Ratio:        0.69        (1.0-2.2) MV e'             0.03 m/s    (>8.0) MV lateral e'     0.06 m/s MV medial e'      0.03 m/s MV A Dur:         135.00 msec E/e' Ratio:       14.00       (<8.0) PulmV Sys Nilesh:    41.10 cm/s PulmV Munroe Nilesh:   18.60 cm/s PulmV S/D Nilesh:    2.20 PulmV A Revs Nilesh: 20.40 cm/s PulmV A Revs Dur: 103.00 msec MITRAL VALVE:                Normal Ranges: MV DT: 79 msec (150-240msec) AORTIC VALVE:                        Normal Ranges: AoV Vmax:     0.94 m/s (<=1.7m/s) AoV Peak PG:  3.5 mmHg (<20mmHg) LVOT Max Nilesh: 0.63 m/s (<=1.1m/s)  RIGHT VENTRICLE: TAPSE: 17.4 mm TRICUSPID VALVE/RVSP:                             Normal Ranges: Peak TR Velocity: 1.78 m/s Est. RA Pressure: 3 mmHg RV Syst Pressure: 15.7 mmHg (< 30mmHg) IVC Diam:         0.84 cm Pulmonary Veins: PulmV A Revs Dur: 103.00 msec PulmV A Revs Nilesh: 20.40 cm/s PulmV Munroe Nilesh:   18.60 cm/s PulmV S/D Nilesh:    2.20 PulmV Sys Nilesh:    41.10 cm/s  [id number] Edward Hernandez DO Electronically signed on 1/16/2024 at 6:24:23 PM  ** Final **     Electrocardiogram, 12-lead PRN ACS symptoms    Result Date: 1/16/2024  Normal sinus rhythm Low voltage QRS Prolonged QT Abnormal ECG Confirmed by Gerardo Jimenez ([id number]) on 1/16/2024 11:43:42 AM    Electrocardiogram, 12-lead PRN ACS symptoms    Result Date: 1/15/2024  Sinus tachycardia with Fusion complexes Left axis deviation Septal infarct (cited on or before 14-JAN-2024) T wave abnormality, consider lateral ischemia Abnormal ECG When compared with ECG of 14-JAN-2024 13:12, (unconfirmed) Fusion complexes are now Present QRS  duration has increased Serial changes of Septal infarct Present    ECG 12 Lead    Result Date: 1/15/2024  See ED provider note for full interpretation and clinical correlation    US gallbladder    Result Date: 1/14/2024  Interpreted By:  Houston Conde, STUDY: US GALLBLADDER;  1/14/2024 7:16 pm   INDICATION: Signs/Symptoms:Vomiting, fever, gallbladder edema on CT, assess for cholecystitis.   COMPARISON: None.   ACCESSION NUMBER(S): [id number]   ORDERING CLINICIAN: BANDAR JANG   TECHNIQUE: Multiple sonographic grayscale and color images of the right upper quadrant were performed.   FINDINGS: The liver demonstrates normal echogenicity. There is gallbladder wall thickening measuring 10 mm in thickness and pericholecystic edema. No definite evidence of gallstones. Per the sonographer, sonographic Muñiz sign is absent. The common bile duct measures 3 mm in diameter.   There is limited evaluation of the pancreas secondary to shadowing from overlying bowel gas.   The right kidney measures 8.6 cm in length. No right hydronephrosis.       Prominent gallbladder wall thickening measuring 10 mm in thickness and pericholecystic edema. There is however no definite evidence of gallstones and per the sonographer, sonographic Muñiz sign is absent. The gallbladder wall thickening is therefore nonspecific and may relate to underlying liver disease, hypoproteinemia or cardiac disease. Clinical correlation is recommended and if there is concern for acalculous cholecystitis, HIDA scan may be obtained for further evaluation.   MACRO: None   Signed by: Houston Conde 1/14/2024 7:32 PM Dictation workstation:   [id number]    CT chest abdomen pelvis wo IV contrast    Result Date: 1/14/2024  Interpreted By:  Houston Conde, STUDY: CT CHEST ABDOMEN PELVIS WO CONTRAST;  1/14/2024 4:58 pm   INDICATION: Signs/Symptoms:sob and pain.   COMPARISON: 2/9/2023   ACCESSION NUMBER(S): [id number]   ORDERING CLINICIAN: RADHA VALDEZ   TECHNIQUE:  Contiguous axial images of the chest, abdomen and pelvis were obtained without intravenous contrast. Coronal and sagittal reformatted images were obtained from the axial images.   FINDINGS: CT CHEST:   The examination is limited secondary lack of intravenous contrast.   There is limited evaluation of the vascular structures on the noncontrast examination. There is air in the left subclavian vasculature in the left arm and also vasculature in the imaged lower neck.  There is minimal air in the central pulmonary vein. There is also air in vasculature in the chest wall.   Hyperdensity in vasculature in superior mediastinum may relate to vascular calcification.   Prominent lymph nodes in the imaged lower neck measures 1.4 cm mildly prominent axillary lymph nodes. Evaluation for mediastinal and hilar lymphadenopathy is limited on noncontrast examination. There are multiple prominent superior mediastinal lymph nodes which measure up to 1.8 cm. 1.9 cm subcarinal lymph node. There also prominent periaortic lymph nodes which measure up to 1.3 cm.   The heart is normal in size. Coronary artery atherosclerotic calcifications. There is moderate size left pleural effusion which appears partially loculated and small right pleural effusion. There is bibasilar atelectatic/consolidative opacity. There is a new 2 cm x 0.9 cm subpleural opacity in the right lower lobe with mild calcifications. No pneumothorax.   Mild bilateral gynecomastia.   No acute fracture of the thoracic spine.   CT ABDOMEN PELVIS:   Evaluation the abdomen pelvis is limited secondary to lack of intravenous contrast. Limited evaluation for liver mass on noncontrast examination. There is gallbladder wall thickening and pericholecystic edema.   Limited evaluation the pancreas secondary to lack of intravenous contrast. There is a tubular opacity measuring 5 mm in diameter the pancreatic tail may relate to distal pancreatic duct dilatation.   No splenomegaly.   The  adrenal glands appear unremarkable.   There are multiple bilateral renal cysts and subcentimeter hypodensities too small to characterize. There are also several in indeterminate renal lesions may correspond to proteinaceous/hemorrhagic cysts however there is limited evaluation for renal mass on noncontrast examination. Bilateral renal vascular calcifications and nonobstructive calculi. No hydronephrosis.   Atherosclerotic calcification of the aorta and abdominal and pelvic vasculature.   There are multiple prominent retroperitoneal lymph nodes which are also seen on the prior examination and measured 1.4 cm enlarged right inguinal lymph node is stable measures 2.2 cm and 1.3 cm.   There is partially calcified density subcutaneous fat in the left anterior abdominal wall which is more density in comparison to prior examination. There is subcutaneous edema in the abdominal and pelvic wall. There is air in vasculature in the abdominal wall.   The stomach is underdistended and not well evaluated. No evidence of bowel obstruction. Evaluation the bowel is overall limited on the noncontrast examination.   Underdistention versus urinary bladder wall thickening.   There is a right femoral central venous catheter which terminates in the chest at level of the SVC.   Multilevel degenerative change of the lumbar spine and changes of renal osteodystrophy. Multilevel Schmorl's nodes.       1.   Air in vasculature in the chest, abdomen and pelvis as described above which may relate to vascular access, however clinical correlation is recommended if there is concern for other etiologies.   2. Moderate size partially loculated left pleural effusion and small right pleural effusion and bilateral atelectatic/consolidative opacities. New 2 cm x 0.9 cm subpleural opacity right lower lobe with mild calcifications.   3.  Right femoral central venous catheter which extends into the chest and terminates in the SVC.   4.  Lymphadenopathy in the  imaged lower neck and mediastinal lymphadenopathy. Multiple prominent retroperitoneal lymph nodes are also noted and enlarged inguinal lymph nodes. The lymph nodes may be reactive in nature, however clinical correlation is recommended there is concern for malignancy or other etiology.   5.   Gallbladder wall thickening and pericholecystic edema is nonspecific and may relate to underlying liver disease, hypoproteinemia or cardiac disease, however if there is concern for acute cholecystitis/acute gallbladder pathology, right upper quadrant ultrasound is recommended for further evaluation.   6.   Low attenuation tubular opacity in the pancreatic tail measuring 5 mm in diameter may correspond to dilated pancreatic duct and may be better assessed on follow-up contrast enhanced imaging.   7.  Bilateral renal atrophy and multiple bilateral renal cysts and subcentimeter hypodensities too small to characterize. Limited evaluation for renal mass on noncontrast examination.   8.  Extensive atherosclerotic calcification in the chest, abdomen and pelvis.   9.  Underdistention versus urinary bladder wall thickening; please correlate urinalysis to evaluate for cystitis.   MACRO: None   Signed by: Houston Conde 1/14/2024 6:05 PM Dictation workstation:   [id number]    XR chest 2 views    Result Date: 1/14/2024  Interpreted By:  Jarvis Holcomb, STUDY: Chest dated  1/14/2024.   INDICATION: Signs/Symptoms:pain   COMPARISON: Chest dated 11/10/2023.   ACCESSION NUMBER(S): [id number]   ORDERING CLINICIAN: RADHA VALDEZ   TECHNIQUE: One view of the chest.   FINDINGS: There is left basilar opacity with blunting of the costophrenic angle. Linear opacities are seen in the right lower lung zone.  No pneumothorax is evident. The cardiomediastinal silhouette is  not enlarged.Degenerative change is seen of the spine and shoulders.       1. Small left pleural effusion with associated atelectasis and/or pneumonitis. 2. Right basilar atelectasis  and/or scarring.   MACRO: None   Signed by: Jarvis Holcomb 1/14/2024 2:51 PM Dictation workstation:   [id number]              Assessment/Plan   Principal Problem:    Septic shock (CMS/HCC)    IMPRESSION  TYPE 2 DIABETES MELLITUS  LONG TERM CURRENT INSULIN USE  Complicated by ESRD, amputations  Low insulin requirement  Denies history of DKA        RECOMMENDATIONS  To Increase Insulin glargine to 8 units at bedtime tonight  To continue insulin correction scale TID AC   Diabetic diet as tolerated   Accu-Cheks ACHS    Hypoglycemic protocol   Will continue to follow       Gibson Fortune MD

## 2024-01-20 NOTE — PROCEDURES
Seen on dialysis.  Has a temporary hemodialysis catheter now, cultures are still negative.  Hoping for a tunneled dialysis line as early as Monday.  3 potassium bath, minimal fluid removal.  He should have evaluation for an AV fistula or graft in the femoral position.  Monitoring the hemoglobin, phosphorus, and blood pressure.  Multifactorial anemia on erythropoietin.

## 2024-01-21 LAB
ALBUMIN SERPL BCP-MCNC: 2.5 G/DL (ref 3.4–5)
ANION GAP SERPL CALC-SCNC: 16 MMOL/L (ref 10–20)
BUN SERPL-MCNC: 29 MG/DL (ref 6–23)
CALCIUM SERPL-MCNC: 8.6 MG/DL (ref 8.6–10.3)
CHLORIDE SERPL-SCNC: 93 MMOL/L (ref 98–107)
CO2 SERPL-SCNC: 26 MMOL/L (ref 21–32)
CREAT SERPL-MCNC: 5.89 MG/DL (ref 0.5–1.3)
EGFRCR SERPLBLD CKD-EPI 2021: 12 ML/MIN/1.73M*2
ERYTHROCYTE [DISTWIDTH] IN BLOOD BY AUTOMATED COUNT: 21.1 % (ref 11.5–14.5)
GLUCOSE BLD MANUAL STRIP-MCNC: 148 MG/DL (ref 74–99)
GLUCOSE BLD MANUAL STRIP-MCNC: 159 MG/DL (ref 74–99)
GLUCOSE BLD MANUAL STRIP-MCNC: 243 MG/DL (ref 74–99)
GLUCOSE BLD MANUAL STRIP-MCNC: 382 MG/DL (ref 74–99)
GLUCOSE SERPL-MCNC: 455 MG/DL (ref 74–99)
HCT VFR BLD AUTO: 24.6 % (ref 41–52)
HGB BLD-MCNC: 7.7 G/DL (ref 13.5–17.5)
MCH RBC QN AUTO: 26.8 PG (ref 26–34)
MCHC RBC AUTO-ENTMCNC: 31.3 G/DL (ref 32–36)
MCV RBC AUTO: 86 FL (ref 80–100)
NRBC BLD-RTO: 0 /100 WBCS (ref 0–0)
PHOSPHATE SERPL-MCNC: 3.5 MG/DL (ref 2.5–4.9)
PLATELET # BLD AUTO: 178 X10*3/UL (ref 150–450)
POTASSIUM SERPL-SCNC: 3.5 MMOL/L (ref 3.5–5.3)
RBC # BLD AUTO: 2.87 X10*6/UL (ref 4.5–5.9)
SODIUM SERPL-SCNC: 131 MMOL/L (ref 136–145)
WBC # BLD AUTO: 6.8 X10*3/UL (ref 4.4–11.3)

## 2024-01-21 PROCEDURE — 2500000001 HC RX 250 WO HCPCS SELF ADMINISTERED DRUGS (ALT 637 FOR MEDICARE OP): Performed by: NURSE PRACTITIONER

## 2024-01-21 PROCEDURE — 2500000004 HC RX 250 GENERAL PHARMACY W/ HCPCS (ALT 636 FOR OP/ED): Performed by: NURSE PRACTITIONER

## 2024-01-21 PROCEDURE — 2500000001 HC RX 250 WO HCPCS SELF ADMINISTERED DRUGS (ALT 637 FOR MEDICARE OP): Performed by: SPECIALIST

## 2024-01-21 PROCEDURE — 2500000002 HC RX 250 W HCPCS SELF ADMINISTERED DRUGS (ALT 637 FOR MEDICARE OP, ALT 636 FOR OP/ED): Performed by: NURSE PRACTITIONER

## 2024-01-21 PROCEDURE — 2500000002 HC RX 250 W HCPCS SELF ADMINISTERED DRUGS (ALT 637 FOR MEDICARE OP, ALT 636 FOR OP/ED): Performed by: INTERNAL MEDICINE

## 2024-01-21 PROCEDURE — 80069 RENAL FUNCTION PANEL: CPT | Performed by: INTERNAL MEDICINE

## 2024-01-21 PROCEDURE — 85027 COMPLETE CBC AUTOMATED: CPT | Performed by: NURSE PRACTITIONER

## 2024-01-21 PROCEDURE — 99232 SBSQ HOSP IP/OBS MODERATE 35: CPT | Performed by: INTERNAL MEDICINE

## 2024-01-21 PROCEDURE — 1100000001 HC PRIVATE ROOM DAILY

## 2024-01-21 PROCEDURE — 82947 ASSAY GLUCOSE BLOOD QUANT: CPT

## 2024-01-21 RX ORDER — OXYCODONE AND ACETAMINOPHEN 5; 325 MG/1; MG/1
1 TABLET ORAL EVERY 4 HOURS PRN
Status: DISCONTINUED | OUTPATIENT
Start: 2024-01-21 | End: 2024-01-23 | Stop reason: HOSPADM

## 2024-01-21 RX ORDER — INSULIN LISPRO 100 [IU]/ML
0-15 INJECTION, SOLUTION INTRAVENOUS; SUBCUTANEOUS
Status: DISCONTINUED | OUTPATIENT
Start: 2024-01-21 | End: 2024-01-23 | Stop reason: HOSPADM

## 2024-01-21 RX ORDER — INSULIN GLARGINE 100 [IU]/ML
12 INJECTION, SOLUTION SUBCUTANEOUS NIGHTLY
Status: DISCONTINUED | OUTPATIENT
Start: 2024-01-21 | End: 2024-01-23 | Stop reason: HOSPADM

## 2024-01-21 RX ORDER — OXYCODONE AND ACETAMINOPHEN 5; 325 MG/1; MG/1
2 TABLET ORAL EVERY 4 HOURS PRN
Status: DISCONTINUED | OUTPATIENT
Start: 2024-01-21 | End: 2024-01-23 | Stop reason: HOSPADM

## 2024-01-21 RX ORDER — VANCOMYCIN HYDROCHLORIDE 750 MG/150ML
750 INJECTION, SOLUTION INTRAVENOUS ONCE
Status: DISCONTINUED | OUTPATIENT
Start: 2024-01-21 | End: 2024-01-23 | Stop reason: HOSPADM

## 2024-01-21 RX ADMIN — PANTOPRAZOLE SODIUM 40 MG: 40 TABLET, DELAYED RELEASE ORAL at 08:20

## 2024-01-21 RX ADMIN — Medication 1 CAPSULE: at 08:20

## 2024-01-21 RX ADMIN — SEVELAMER CARBONATE 2400 MG: 800 TABLET, FILM COATED ORAL at 12:16

## 2024-01-21 RX ADMIN — HEPARIN SODIUM 5000 UNITS: 5000 INJECTION INTRAVENOUS; SUBCUTANEOUS at 05:41

## 2024-01-21 RX ADMIN — LEVOTHYROXINE SODIUM 125 MCG: 125 TABLET ORAL at 05:42

## 2024-01-21 RX ADMIN — OXYCODONE HYDROCHLORIDE AND ACETAMINOPHEN 1 TABLET: 5; 325 TABLET ORAL at 00:32

## 2024-01-21 RX ADMIN — INSULIN LISPRO 10 UNITS: 100 INJECTION, SOLUTION INTRAVENOUS; SUBCUTANEOUS at 08:20

## 2024-01-21 RX ADMIN — SEVELAMER CARBONATE 2400 MG: 800 TABLET, FILM COATED ORAL at 08:20

## 2024-01-21 RX ADMIN — SEVELAMER CARBONATE 2400 MG: 800 TABLET, FILM COATED ORAL at 17:42

## 2024-01-21 RX ADMIN — PANTOPRAZOLE SODIUM 40 MG: 40 TABLET, DELAYED RELEASE ORAL at 21:00

## 2024-01-21 RX ADMIN — INSULIN LISPRO 6 UNITS: 100 INJECTION, SOLUTION INTRAVENOUS; SUBCUTANEOUS at 12:16

## 2024-01-21 RX ADMIN — INSULIN LISPRO 3 UNITS: 100 INJECTION, SOLUTION INTRAVENOUS; SUBCUTANEOUS at 17:43

## 2024-01-21 ASSESSMENT — PAIN SCALES - GENERAL
PAINLEVEL_OUTOF10: 4
PAINLEVEL_OUTOF10: 0 - NO PAIN
PAINLEVEL_OUTOF10: 5 - MODERATE PAIN
PAINLEVEL_OUTOF10: 0 - NO PAIN
PAINLEVEL_OUTOF10: 0 - NO PAIN

## 2024-01-21 ASSESSMENT — COGNITIVE AND FUNCTIONAL STATUS - GENERAL
DAILY ACTIVITIY SCORE: 14
MOVING TO AND FROM BED TO CHAIR: A LOT
PERSONAL GROOMING: A LOT
MOBILITY SCORE: 10
DRESSING REGULAR UPPER BODY CLOTHING: A LOT
CLIMB 3 TO 5 STEPS WITH RAILING: TOTAL
HELP NEEDED FOR BATHING: A LOT
STANDING UP FROM CHAIR USING ARMS: TOTAL
TURNING FROM BACK TO SIDE WHILE IN FLAT BAD: A LOT
WALKING IN HOSPITAL ROOM: TOTAL
TOILETING: A LOT
DRESSING REGULAR LOWER BODY CLOTHING: A LOT
MOVING FROM LYING ON BACK TO SITTING ON SIDE OF FLAT BED WITH BEDRAILS: A LITTLE

## 2024-01-21 ASSESSMENT — PAIN - FUNCTIONAL ASSESSMENT
PAIN_FUNCTIONAL_ASSESSMENT: 0-10

## 2024-01-21 ASSESSMENT — PAIN DESCRIPTION - ORIENTATION: ORIENTATION: RIGHT

## 2024-01-21 ASSESSMENT — PAIN DESCRIPTION - LOCATION: LOCATION: LEG

## 2024-01-21 NOTE — CONSULTS
Reason For Consult  ESRD on hemodialysis     History Of Present Illness  Edwar Hemphill is a 38 y.o. male with a past medical history of end-stage renal disease on hemodialysis via right femoral TDC who resides at [facility]. 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  He later had stump debridement on 7/27 with wound VAC placement.  He had polymicrobial infection including resistant E coli, Acinetobacter baumannii and Enterobacter fecalis. He was admitted in September with positive blood cultures growing Acinetobacter baumannii and Stenotrophomonas. He went to IR for dialysis line exchange.  He then was admitted again in November where CT noted left sided fluid collection concerning for abscess.  He grew MRSA and Streptococcus, catheter was exchanged on November 13, he completed vancomycin at [facility].      He comes in now with fever, chills, congestion.  Attempt was made at triple-lumen catheter placement in the neck, could not pass a wire.  Was seen by Dr. Bullock, on norepinephrine, vancomycin and Zosyn.  We dialyzed him and removed the dialysis line, he then had a temporary line placed, was dialyzed yesterday.  I reviewed the transesophageal echocardiogram, no vegetation.       Past Medical History:   Diagnosis Date    Chronic kidney disease     Diabetes mellitus (CMS/[facility])     ESRD (end stage renal disease) (CMS/[facility])     Essential (primary) hypertension 05/26/2017    HTN (hypertension), benign    GERD (gastroesophageal reflux disease)     Hyperlipidemia     Hypothyroidism        Past Surgical History:   Procedure Laterality Date    ARM AMPUTATION AT ELBOW Right 05/2021    AV FISTULA  PLACEMENT W/ PTFE      CT AORTA AND BILATERAL ILIOFEMORAL RUNOFF ANGIOGRAM W AND/OR WO IV CONTRAST  02/09/2023    CT AORTA AND BILATERAL ILIOFEMORAL RUNOFF ANGIOGRAM W AND/OR WO IV CONTRAST 2/9/2023 [facility]CTOR [facility]    IR CVC EXCHANGE  11/13/2023    IR CVC EXCHANGE 11/13/2023 [facility]    LEG AMPUTATION THROUGH KNEE Left 07/08/2023    LEG AMPUTATION THROUGH LOWER TIBIA AND FIBULA Left 07/05/2023    TOE AMPUTATION Left 02/17/2023    left 4th    WOUND DEBRIDEMENT Left 07/26/2023    leg    WOUND DEBRIDEMENT Left 08/02/2023    multiple leg debridements       Social History     Tobacco Use    Smoking status: Every Day     Packs/day: 0.25     Years: 15.00     Additional pack years: 0.00     Total pack years: 3.75     Types: Cigarettes    Smokeless tobacco: Never        Family History  Family History   Problem Relation Name Age of Onset    Other (DIABETES DUE TO UNDERLYING CONDITION WITH MICROALBUMINURIA) Father      Other (DIABETES DUE TO UNDERLYING CONDITION WITH MICROALBUMINURIA [Other]) Other AUNT     Other (DIABETES DUE TO UNDERLYING CONDITION WITH MICROALBUMINURIA [Other]) Other GP         Allergies  Cashew nut    Review of Systems   10 point review of systems obtained and negative unless stated in HPI:     Physical Exam   Constitutional: Well developed, awake/alert/oriented  x3   Eyes: Periorbital swelling   ENMT: mucous membranes moist   Head/Neck: Facial edema  Difficult to assess JVD   Respiratory/Thorax: Diminished bibasilar breath sounds,  no wheezing, rales or rhonchi   Cardiovascular: regular rhythm, normal  S1 and S2   Gastrointestinal: Nondistended, soft, non-tender,  no rebound tenderness or guarding   Genitourinary: No Castanon   Extremities: Less edema on right than prior   L AKA   RUE amputation  R femoral HD catheter   Neurological: No asterixis   Psychological: Appropriate mood and behavior   Skin: L AKA stump  Right femoral TDC           I&O 24HR    Intake/Output Summary (Last 24 hours) at  1/21/2024 1222  Last data filed at 1/21/2024 0936  Gross per 24 hour   Intake 120 ml   Output --   Net 120 ml         Vitals 24HR  Heart Rate:  []   Temp:  [36.4 °C (97.5 °F)-37.9 °C (100.2 °F)]   Resp:  [18]   BP: (106-133)/(72-79)   SpO2:  [93 %-98 %]     Scheduled Medications  B complex-vitamin C-folic acid, 1 capsule, oral, Daily  epoetin tyson or biosimilar, 10,000 Units, subcutaneous, Once per day on Mon Wed Fri  heparin (porcine), 5,000 Units, subcutaneous, q8h  heparin, 1,400 Units, intra-catheter, After Dialysis  heparin, 1,400 Units, intra-catheter, After Dialysis  insulin glargine, 12 Units, subcutaneous, Nightly  insulin lispro, 0-15 Units, subcutaneous, TID with meals  levothyroxine, 125 mcg, oral, Daily  lidocaine, 5 mL, infiltration, Once  lidocaine PF, 10 mL, subcutaneous, Once  pantoprazole, 40 mg, oral, BID   Or  pantoprazole, 40 mg, intravenous, BID  perflutren lipid microspheres, 1 mL of dilution, intravenous, Once in imaging  sevelamer carbonate, 2,400 mg, oral, TID with meals  sodium bicarbonate 1 mEq in lidocaine-epinephrine (Xylocaine W/EPI) 1 %-1:100,000 9 mL Syringe, 10 mL, subcutaneous, Once  vancomycin, 750 mg, intravenous, Once      Continuous medications       PRN medications: acetaminophen, bisacodyl, dextrose 10 % in water (D10W), dextrose, fentaNYL PF, glucagon, heparin flush, iohexol, lidocaine, midazolam, ondansetron ODT **OR** ondansetron, oxyCODONE-acetaminophen, oxyCODONE-acetaminophen, oxygen, vancomycin     Relevant Results  Results from last 7 days   Lab Units 01/21/24  0607 01/20/24  0620 01/19/24  0536 01/15/24  0632 01/14/24  1319   WBC AUTO x10*3/uL 6.8 6.4 8.0   < > 10.7   HEMOGLOBIN g/dL 7.7* 7.2* 7.6*   < > 10.7*   HEMATOCRIT % 24.6* 23.2* 23.4*   < > 35.2*   PLATELETS AUTO x10*3/uL 178 172 134*   < > 190   NEUTROS PCT AUTO %  --   --   --   --  85.3   LYMPHS PCT AUTO %  --   --   --   --  7.7   MONOS PCT AUTO %  --   --   --   --  5.1   EOS PCT AUTO %  --   --    --   --  1.0    < > = values in this interval not displayed.        Results from last 7 days   Lab Units 01/21/24  0607 01/20/24  0620 01/19/24  0536 01/18/24  0503 01/15/24  0632 01/14/24  1319   SODIUM mmol/L 131* 132* 136 136   < > 131*   POTASSIUM mmol/L 3.5 4.2 3.8 3.6   < > 4.4   CHLORIDE mmol/L 93* 91* 93* 94*   < > 93*   CO2 mmol/L 26 21 20* 24   < > 25   BUN mg/dL 29* 58* 52* 46*   < > 27*   CREATININE mg/dL 5.89* 9.63* 8.95* 6.97*   < > 7.74*   CALCIUM mg/dL 8.6 7.7* 8.5* 9.0   < > 9.8   PROTEIN TOTAL g/dL  --   --   --  6.4  --  8.3*   BILIRUBIN TOTAL mg/dL  --   --   --  0.5  --  0.6   ALK PHOS U/L  --   --   --  133*  --  183*   ALT U/L  --   --   --  9*  --  10   AST U/L  --   --   --  12  --  13   GLUCOSE mg/dL 455* 516* 252* 131*   < > 85    < > = values in this interval not displayed.        Transesophageal Echo (POLI)   Final Result      IR CVC nontunneled   Final Result   1. 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   2. Ultrasound and fluoroscopically guided left groin temporary   hemodialysis catheter, ready for use.        PLAN:   Consider conversion of the left groin temporary access to semi   permanent tunneled venous access when clinically appropriate, versus   replacement of a tunneled hemodialysis catheter in the right groin        MACRO:   None        Signed by: Ronaldo Dempsey 1/19/2024 3:36 PM   Dictation workstation:   [id number]      NM hepatobiliary   Final Result   1. Limited examination secondary to technical difficulties with a   single static image approximately 90 minutes post-injection with   radiotracer uptake within the liver, gallbladder, and small bowel,   findings exclude acute cholecystitis.        I personally reviewed the images/study, and I agree with the findings   as stated above. This study was interpreted at [place], [place].        MACRO:   None        Signed by: Nessa Ayala 1/18/2024 9:53  AM   Dictation workstation:   [id number]      NM injection no scan   Final Result      XR abdomen 1 view   Final Result   1. A nonspecific, nonobstructive bowel gas pattern.        MACRO:   None        Signed by: Dean Stanton 1/17/2024 11:29 AM   Dictation workstation:   [id number]      Transthoracic Echo (TTE) Complete   Final Result      US gallbladder   Final Result   Prominent gallbladder wall thickening measuring 10 mm in thickness   and pericholecystic edema. There is however no definite evidence of   gallstones and per the sonographer, sonographic Muñiz sign is   absent. The gallbladder wall thickening is therefore nonspecific and   may relate to underlying liver disease, hypoproteinemia or cardiac   disease. Clinical correlation is recommended and if there is concern   for acalculous cholecystitis, HIDA scan may be obtained for further   evaluation.        MACRO:   None        Signed by: Houston Conde 1/14/2024 7:32 PM   Dictation workstation:   [id number]      CT chest abdomen pelvis wo IV contrast   Final Result   1.   Air in vasculature in the chest, abdomen and pelvis as described   above which may relate to vascular access, however clinical   correlation is recommended if there is concern for other etiologies.        2. Moderate size partially loculated left pleural effusion and small   right pleural effusion and bilateral atelectatic/consolidative   opacities. New 2 cm x 0.9 cm subpleural opacity right lower lobe with   mild calcifications.        3.  Right femoral central venous catheter which extends into the   chest and terminates in the SVC.        4.  Lymphadenopathy in the imaged lower neck and mediastinal   lymphadenopathy. Multiple prominent retroperitoneal lymph nodes are   also noted and enlarged inguinal lymph nodes. The lymph nodes may be   reactive in nature, however clinical correlation is recommended there   is concern for malignancy or other etiology.        5.   Gallbladder wall  thickening and pericholecystic edema is   nonspecific and may relate to underlying liver disease,   hypoproteinemia or cardiac disease, however if there is concern for   acute cholecystitis/acute gallbladder pathology, right upper quadrant   ultrasound is recommended for further evaluation.        6.   Low attenuation tubular opacity in the pancreatic tail measuring   5 mm in diameter may correspond to dilated pancreatic duct and may be   better assessed on follow-up contrast enhanced imaging.        7.  Bilateral renal atrophy and multiple bilateral renal cysts and   subcentimeter hypodensities too small to characterize. Limited   evaluation for renal mass on noncontrast examination.        8.  Extensive atherosclerotic calcification in the chest, abdomen and   pelvis.        9.  Underdistention versus urinary bladder wall thickening; please   correlate urinalysis to evaluate for cystitis.        MACRO:   None        Signed by: Houston Conde 1/14/2024 6:05 PM   Dictation workstation:   [id number]      XR chest 2 views   Final Result   1. Small left pleural effusion with associated atelectasis and/or   pneumonitis.   2. Right basilar atelectasis and/or scarring.        MACRO:   None        Signed by: Jarvis Holcomb 1/14/2024 2:51 PM   Dictation workstation:   [id number]      Consult to Interventional Radiology    (Results Pending)         Assessment/Plan     Edwar Hemphill is a 38 y.o. male with a past medical history of end-stage renal disease on hemodialysis via right femoral TDC who resides at [facility]. 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  He later had stump debridement on  7/27 with wound VAC placement.  He had polymicrobial infection including resistant E coli, Acinetobacter baumannii and Enterobacter fecalis. He was admitted in September with positive blood cultures growing Acinetobacter baumannii and Stenotrophomonas. He went to IR for dialysis line exchange.  He then was admitted again in November where CT noted left sided fluid collection concerning for abscess.  He grew MRSA and Streptococcus, catheter was exchanged on November 13, he completed vancomycin at [facility].      He comes in now with fever, chills, congestion.  Attempt was made at triple-lumen catheter placement in the neck, could not pass a wire.  Was seen by Dr. Bullock, on norepinephrine, vancomycin and Zosyn.    He is less fluid overloaded than is typical for him.  He had a full dialysis last Friday on the 12th, I dialyzed him again on Tuesday, we then removed his dialysis line. We placed a temporary dialysis line on Friday, dialyzed him yesterday.  Cultures are still negative.  I reviewed the transesophageal echocardiogram, no evidence of a vegetation.  I am hoping that he can have a tunneled line placed tomorrow.  I do not know whether the right femoral position would be reasonable, he has a poorly healing right foot, an AV fistula may not be wise.  He has no place for right upper extremity dialysis access, the left may not be prudent as it is his only arm.  Peritoneal dialysis may be his best option, whether he can safely make the connections with 1 arm is the question, I have discussed this with him.  I placed an order for the dialysis line for tomorrow if okay with Dr. Jauregui.        Principal Problem:    Septic shock (CMS/[facility])      I spent 50 minutes in the professional and overall care of this patient.      Zeeshan Gonzalez MD

## 2024-01-21 NOTE — PROGRESS NOTES
Xiomara Hemphill is a 38 y.o. male on day 6 of admission presenting with Septic shock (CMS/HCC).      Subjective   No complains   Bs was high       Objective     Last Recorded Vitals  /74 (BP Location: Left arm, Patient Position: Lying)   Pulse 89   Temp 36.8 °C (98.2 °F) (Temporal)   Resp 18   Wt 70.3 kg (155 lb)   SpO2 94%   Intake/Output last 3 Shifts:    Intake/Output Summary (Last 24 hours) at 1/20/2024 1957  Last data filed at 1/20/2024 1200  Gross per 24 hour   Intake 200 ml   Output 1000 ml   Net -800 ml       Admission Weight  Weight: 79.4 kg (175 lb) (01/14/24 1258)    Daily Weight  01/19/24 : 70.3 kg (155 lb)    Image Results  Transesophageal Echo (POLI)     [facility] [place], [address] [place] [address]               Tel [phone] and Fax [phone]    TRANSESOPHAGEAL ECHOCARDIOGRAM REPORT       Patient Name:      XIOMARA HEMPHILL         Reading Physician:    [id number] Luigi Bach MD  Study Date:        1/19/2024            Ordering Provider:    [id number] KIERRA RAY  MRN/PID:           [id number]             Fellow:  Accession#:        [id number]         Nurse:                Ronaldo Woo RN  Date of Birth/Age: 1985 / 38 years Sonographer:          Alejandro Edouard RDCS  Gender:            M                    Additional Staff:     Noah PARRA  Height:            175.26 cm            Admit Date:           1/14/2024  Weight:            70.31 kg             Admission Status:     Inpatient -                                                                Routine  BSA:               1.85 m2              Encounter#:           [id number]                                          Department Location:  [facility]                                                                Lab  Blood  Pressure: 130 /68 mmHg    Study Type:    TRANSESOPHAGEAL ECHO (POLI)  Diagnosis/ICD: Bacteremia-R78.81  Indication:    Bacteremia  CPT Code:      POLI Complete-56530; Doppler Limited-34804; Color Doppler-07816    Patient History:  Allergies:         Cashew Nut.  Smoker:            Current.  Diabetes:          Yes Insulin  Pertinent History: HTN. 38 y.o. male presents for POLI for bacteremia.    Study Detail: The following Echo studies were performed: 2D. Agitated saline                used as a contrast agent for intraseptal flow evaluation. Total                contrast used for this procedure was 10 mL via IV push. Patient's                heart rhythm is sinus tachycardia and normal sinus rhythm. The                patient was sedated.       PHYSICIAN INTERPRETATION:  POLI Details: The POLI probe used was 5177. Technically adequate omniplane transesophageal echocardiogram performed.  POLI Medication: The pharynx was anesthetized with Cetacaine spray and viscous lidocaine. The patient was sedated by Anesthesia; please refer to anesthesia flow sheet for medications used.  POLI Procedure: The probe was passed without difficulty. The patient tolerated the procedure well without any apparent complications.  Left Ventricle: The left ventricular systolic function is normal, with an estimated ejection fraction of 60%. There are no regional wall motion abnormalities. The left ventricular cavity size is normal. Left ventricular diastolic filling was not assessed.  Left Atrium: The left atrium is normal in size. A bubble study using agitated saline was performed. Bubble study is negative. There is no thrombus visualized in the left atrial appendage and the left atrial appendage Doppler velocities are mildly reduced.  Right Ventricle: The right ventricle is normal in size. There is normal right ventricular global systolic function.  Right Atrium: The right atrium is normal in size.  Aortic Valve: The aortic valve is trileaflet.  There is no evidence of aortic valve regurgitation.  Mitral Valve: The mitral valve is normal in structure. There is no evidence of mitral valve regurgitation.  Tricuspid Valve: The tricuspid valve is structurally normal. There is trace tricuspid regurgitation.  Pulmonic Valve: The pulmonic valve is structurally normal. There is no indication of pulmonic valve regurgitation.  Pericardium: There is no pericardial effusion noted.  Aorta: The aortic root is normal. There is no plaque visualized in the descending aorta.       CONCLUSIONS:   1. Left ventricular systolic function is normal with a 60% estimated ejection fraction.   2. No definite valvular vegetations were visualized.    QUANTITATIVE DATA SUMMARY:     [id number] Luigi Bach MD  Electronically signed on 1/19/2024 at 7:32:42 PM       ** Final **  IR CVC nontunneled  Narrative: Interpreted By:  Ronaldo Dempsey,   STUDY:  IR CVC NONTUNNELED; ;  1/19/2024 12:31 pm  1. ULTRASOUND-GUIDED LEFT GROIN VENOUS ACCESS  2. LEFT COMMON ILIAC AND INFERIOR VENA CAVAGRAM  3. ULTRASOUND FLUOROSCOPICALLY GUIDED LEFT GROIN TEMPORARY  HEMODIALYSIS CATHETER          INDICATION:  Signs/Symptoms:temp HD cath placement. 38-year-old man end-stage  renal disease, prior right groin tunneled hemodialysis catheter,  bacteremia necessitating line holiday. He requires temporary  hemodialysis access.      COMPARISON:  CT chest abdomen pelvis 01/14/2024      ACCESSION NUMBER(S):  [id number]      ORDERING CLINICIAN:  ALKA VIEYRA      TECHNIQUE:      ATTENDING : Ronaldo Dempsey M.D.      TECHNICAL DESCRIPTION/FINDINGS: The procedure, including all risks,  benefits and alternatives were explained to the patient in detail.  All questions were answered and written informed consent was obtained.      The patient was positioned supine on the fluoroscopy table. A  time-out was performed.      The bilateral groins were prepped and draped in usual sterile  fashion. Focused ultrasound  was performed of the bilateral common  femoral veins demonstrating intraluminal echogenicity with small  calcification indicating chronic clot. Ultrasound images were saved.      The decision was made to place the temporary catheter in the left  contralateral access as the recently infected catheter. Focused  ultrasound was performed of the left common femoral vein  demonstrating intraluminal echogenicity and partial compressibility.  Ultrasound images were saved. 1% lidocaine was administered  subcutaneously for local anesthesia. Under real-time ultrasound  guidance, a 21 gauge access needle was advanced into the intraluminal  thrombus. Ultrasound images were saved. Numerous attempts were made  to pass an 018 guidewire centrally through the thrombus into the  venous lumen without success. This was attempted multiple times and  was unsuccessful.      A small left great saphenous venous collateral was then identified in  the more inferomedial left thigh and was identified as patent and  compressible. Ultrasound images were saved. 1% lidocaine was  administered subcutaneously for local anesthesia. Under real-time  ultrasound guidance, a 21 gauge access needle was utilized to access  this left great saphenous venous collateral using micropuncture  technique. Ultrasound images were saved. There is spontaneous return  of blood through the micropuncture needle. An 018 guidewire was then  advanced centrally and was shown to course along the left common  femoral vein, and subsequently the external and common iliac veins.  Over the access wire, a 4 Tamazight micropuncture sheath was placed.      An initial left external iliac venogram was performed demonstrating  antegrade flow through the left external and common iliac veins. A  subsequent DSA was performed through the micropuncture sheath  demonstrating patency of the inferior vena cava with multiple venous  webs and stenosis at both the L1-L2 intervertebral space, and  the  inferior cavoatrial junction.      An 035 guidewire was then placed through the micropuncture sheath and  after serial dilation, a 13 [place] by 20 cm Trialysis temporary  hemodialysis catheter was placed. A completion fluoroscopic image  demonstrates the catheter in the left common iliac vein.      Catheter lumens easily aspirated and flushed. Large-bore dialysis  lumens were locked with a concentrated heparinized solution (1000  units/cc). The small bore 3rd lumen was locked with a dilute  heparinized solution. The catheter hub was sutured to the skin with 2  0 Prolene stay suture. A sterile dressing was applied.      SEDATION/MEDICATIONS: Continuous cardiopulmonary monitoring was  performed by a radiology nurse for the duration of the procedure.  1  mg Versed and   50 mcg Fentanyl were administered for moderate  conscious sedation time of 30 minutes.      10 cc 1% Lidocaine was  administered subcutaneously for local anesthesia. SPECIMENS: None.  ESTIMATED BLOOD LOSS:  5 cc  FLOUROSCOPY:  1.7 minutes; DAP  95747 mGy-cm*2; Air Kerma  68.37 mGy  CONTRAST: 10 cc Omnipaque 350      FINDINGS:  Test      Impression: 1. 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  2. Ultrasound and fluoroscopically guided left groin temporary  hemodialysis catheter, ready for use.      PLAN:  Consider conversion of the left groin temporary access to semi  permanent tunneled venous access when clinically appropriate, versus  replacement of a tunneled hemodialysis catheter in the right groin      MACRO:  None      Signed by: Ronaldo Dempsey 1/19/2024 3:36 PM  Dictation workstation:   [id number]      PHYSICAL EXAM  NEUROLOGICAL: No abnormal movements, no changes from before  HEENT: Head atraumatic, perrl,eomi, no oral lesions, no ear rash   NECK: Supple, no ln, jvp flat, thyroid palp  HEART: S1, S2, no added sound  LUNGS: CTAB, no crackles, no rales, no wheezing, no dullness to percussion, sym  exp  EXTREMITIES: no edema  ABDOMEN: Soft, nontender, bowel sound positive, no organomegaly  SKIN: No change  EYES: No changes, perrl, eomi,   ENT: No change    JOINT: No change  Relevant Results               Assessment/Plan        Esrd  Pvd   Anemia   T2dm uncontrolled --> endo input appreciated         Principal Problem:    Septic shock (CMS/HCC)    PLAN  Bs negative till date , cath on Monday 1/22 and dc post to [facility]     t33              Mauricio Estrada MD

## 2024-01-21 NOTE — PROGRESS NOTES
"Edwar Hemphill is a 38 y.o. male on day 7 of admission presenting with Septic shock (CMS/[facility]).    Subjective   Patient has no complaints.  He has a snickers bar and potato chips at bedside.  He states that he did not eat the entire snickers bar.     I have reviewed histories, allergies and medications have been reviewed and there are no changes       Objective     Physical Exam  Vitals and nursing note reviewed.   Constitutional:       General: He is not in acute distress.     Appearance: Normal appearance. He is normal weight.   HENT:      Head: Normocephalic and atraumatic.      Nose: Nose normal.      Mouth/Throat:      Mouth: Mucous membranes are moist.   Eyes:      Extraocular Movements: Extraocular movements intact.   Pulmonary:      Effort: Pulmonary effort is normal.   Musculoskeletal:         General: Normal range of motion.   Skin:     General: Skin is warm.   Neurological:      Mental Status: He is alert and oriented to person, place, and time.   Psychiatric:         Mood and Affect: Mood normal.         Last Recorded Vitals  Blood pressure 106/72, pulse 91, temperature 37.9 °C (100.2 °F), temperature source Oral, resp. rate 18, height 1.753 m (5' 9\"), weight 70.3 kg (155 lb), SpO2 96 %.  Intake/Output last 3 Shifts:  I/O last 3 completed shifts:  In: 200 (2.8 mL/kg) [I.V.:200 (2.8 mL/kg)]  Out: 1000 (14.2 mL/kg) [Other:1000]  Weight: 70.3 kg     Relevant Results  Results from last 7 days   Lab Units 01/21/24  0815 01/21/24  0607 01/20/24  2100 01/20/24  1553 01/20/24  1349 01/20/24  0806 01/20/24  0620 01/19/24  1018 01/19/24  0536 01/18/24  0812 01/18/24  0503 01/17/24  0850 01/17/24  0541   POCT GLUCOSE mg/dL 382*  --  277* 211* 176* 462*  --    < >  --    < >  --    < >  --    GLUCOSE mg/dL  --  455*  --   --   --   --  516*  --  252*  --  131*  --  295*    < > = values in this interval not displayed.     Scheduled medications  B complex-vitamin C-folic acid, 1 capsule, oral, Daily  epoetin tyson or " biosimilar, 10,000 Units, subcutaneous, Once per day on Mon Wed Fri  heparin (porcine), 5,000 Units, subcutaneous, q8h  heparin, 1,400 Units, intra-catheter, After Dialysis  heparin, 1,400 Units, intra-catheter, After Dialysis  insulin glargine, 12 Units, subcutaneous, Nightly  insulin lispro, 0-15 Units, subcutaneous, TID with meals  levothyroxine, 125 mcg, oral, Daily  lidocaine, 5 mL, infiltration, Once  lidocaine PF, 10 mL, subcutaneous, Once  pantoprazole, 40 mg, oral, BID   Or  pantoprazole, 40 mg, intravenous, BID  perflutren lipid microspheres, 1 mL of dilution, intravenous, Once in imaging  sevelamer carbonate, 2,400 mg, oral, TID with meals  sodium bicarbonate 1 mEq in lidocaine-epinephrine (Xylocaine W/EPI) 1 %-1:100,000 9 mL Syringe, 10 mL, subcutaneous, Once  vancomycin, 750 mg, intravenous, Once      Continuous medications     PRN medications  PRN medications: acetaminophen, bisacodyl, dextrose 10 % in water (D10W), dextrose, fentaNYL PF, glucagon, heparin flush, iohexol, lidocaine, midazolam, ondansetron ODT **OR** ondansetron, oxyCODONE-acetaminophen, oxyCODONE-acetaminophen, oxygen, vancomycin    Results for orders placed or performed during the hospital encounter of 01/14/24 (from the past 24 hour(s))   POCT GLUCOSE   Result Value Ref Range    POCT Glucose 176 (H) 74 - 99 mg/dL   POCT GLUCOSE   Result Value Ref Range    POCT Glucose 211 (H) 74 - 99 mg/dL   POCT GLUCOSE   Result Value Ref Range    POCT Glucose 277 (H) 74 - 99 mg/dL   CBC   Result Value Ref Range    WBC 6.8 4.4 - 11.3 x10*3/uL    nRBC 0.0 0.0 - 0.0 /100 WBCs    RBC 2.87 (L) 4.50 - 5.90 x10*6/uL    Hemoglobin 7.7 (L) 13.5 - 17.5 g/dL    Hematocrit 24.6 (L) 41.0 - 52.0 %    MCV 86 80 - 100 fL    MCH 26.8 26.0 - 34.0 pg    MCHC 31.3 (L) 32.0 - 36.0 g/dL    RDW 21.1 (H) 11.5 - 14.5 %    Platelets 178 150 - 450 x10*3/uL   Renal Function Panel   Result Value Ref Range    Glucose 455 (HH) 74 - 99 mg/dL    Sodium 131 (L) 136 - 145 mmol/L     Potassium 3.5 3.5 - 5.3 mmol/L    Chloride 93 (L) 98 - 107 mmol/L    Bicarbonate 26 21 - 32 mmol/L    Anion Gap 16 10 - 20 mmol/L    Urea Nitrogen 29 (H) 6 - 23 mg/dL    Creatinine 5.89 (H) 0.50 - 1.30 mg/dL    eGFR 12 (L) >60 mL/min/1.73m*2    Calcium 8.6 8.6 - 10.3 mg/dL    Phosphorus 3.5 2.5 - 4.9 mg/dL    Albumin 2.5 (L) 3.4 - 5.0 g/dL   POCT GLUCOSE   Result Value Ref Range    POCT Glucose 382 (H) 74 - 99 mg/dL      Transesophageal Echo (POLI)    Result Date: 1/19/2024   [facility], [address] [place] [address]              Tel [phone] and Fax [phone] TRANSESOPHAGEAL ECHOCARDIOGRAM REPORT  Patient Name:      XIOMARA Myaa Physician:    [id number] Luigi Bach MD Study Date:        1/19/2024            Ordering Provider:    [id number] KIERRA RAY MRN/PID:           [id number]             Fellow: Accession#:        [id number]         Nurse:                Ronaldo Woo RN Date of Birth/Age: 1985 / 38 years Sonographer:          Alejandro Edouard RDCS Gender:            M                    Additional Staff:     Noah PARRA Height:            175.26 cm            Admit Date:           1/14/2024 Weight:            70.31 kg             Admission Status:     Inpatient -                                                               Routine BSA:               1.85 m2              Encounter#:           [id number]                                         Department Location:  [facility]                                                               Lab Blood Pressure: 130 /68 mmHg Study Type:    TRANSESOPHAGEAL ECHO (POLI) Diagnosis/ICD: Bacteremia-R78.81 Indication:    Bacteremia CPT Code:      POLI Complete-00428; Doppler Limited-76918; Color Doppler-10147 Patient History:  Allergies:         Cashew Nut. Smoker:            Current. Diabetes:          Yes Insulin Pertinent History: HTN. 38 y.o. male presents for POLI for bacteremia. Study Detail: The following Echo studies were performed: 2D. Agitated saline               used as a contrast agent for intraseptal flow evaluation. Total               contrast used for this procedure was 10 mL via IV push. Patient's               heart rhythm is sinus tachycardia and normal sinus rhythm. The               patient was sedated.  PHYSICIAN INTERPRETATION: POLI Details: The POLI probe used was 5177. Technically adequate omniplane transesophageal echocardiogram performed. POLI Medication: The pharynx was anesthetized with Cetacaine spray and viscous lidocaine. The patient was sedated by Anesthesia; please refer to anesthesia flow sheet for medications used. POLI Procedure: The probe was passed without difficulty. The patient tolerated the procedure well without any apparent complications. Left Ventricle: The left ventricular systolic function is normal, with an estimated ejection fraction of 60%. There are no regional wall motion abnormalities. The left ventricular cavity size is normal. Left ventricular diastolic filling was not assessed. Left Atrium: The left atrium is normal in size. A bubble study using agitated saline was performed. Bubble study is negative. There is no thrombus visualized in the left atrial appendage and the left atrial appendage Doppler velocities are mildly reduced. Right Ventricle: The right ventricle is normal in size. There is normal right ventricular global systolic function. Right Atrium: The right atrium is normal in size. Aortic Valve: The aortic valve is trileaflet. There is no evidence of aortic valve regurgitation. Mitral Valve: The mitral valve is normal in structure. There is no evidence of mitral valve regurgitation. Tricuspid Valve: The tricuspid valve is structurally normal. There is trace tricuspid  regurgitation. Pulmonic Valve: The pulmonic valve is structurally normal. There is no indication of pulmonic valve regurgitation. Pericardium: There is no pericardial effusion noted. Aorta: The aortic root is normal. There is no plaque visualized in the descending aorta.  CONCLUSIONS:  1. Left ventricular systolic function is normal with a 60% estimated ejection fraction.  2. No definite valvular vegetations were visualized. QUANTITATIVE DATA SUMMARY:  [id number] Luigi Bach MD Electronically signed on 1/19/2024 at 7:32:42 PM  ** Final **     IR CVC nontunneled    Result Date: 1/19/2024  Interpreted By:  Ronaldo Dempsey, STUDY: IR CVC NONTUNNELED; ;  1/19/2024 12:31 pm 1. ULTRASOUND-GUIDED LEFT GROIN VENOUS ACCESS 2. LEFT COMMON ILIAC AND INFERIOR VENA CAVAGRAM 3. ULTRASOUND FLUOROSCOPICALLY GUIDED LEFT GROIN TEMPORARY HEMODIALYSIS CATHETER     INDICATION: Signs/Symptoms:temp HD cath placement. 38-year-old man end-stage renal disease, prior right groin tunneled hemodialysis catheter, bacteremia necessitating line holiday. He requires temporary hemodialysis access.   COMPARISON: CT chest abdomen pelvis 01/14/2024   ACCESSION NUMBER(S): [id number]   ORDERING CLINICIAN: ALKA VIEYRA   TECHNIQUE:   ATTENDING : Ronaldo Dempsey M.D.   TECHNICAL DESCRIPTION/FINDINGS: The procedure, including all risks, benefits and alternatives were explained to the patient in detail. All questions were answered and written informed consent was obtained.   The patient was positioned supine on the fluoroscopy table. A time-out was performed.   The bilateral groins were prepped and draped in usual sterile fashion. Focused ultrasound was performed of the bilateral common femoral veins demonstrating intraluminal echogenicity with small calcification indicating chronic clot. Ultrasound images were saved.   The decision was made to place the temporary catheter in the left contralateral access as the recently infected catheter.  Focused ultrasound was performed of the left common femoral vein demonstrating intraluminal echogenicity and partial compressibility. Ultrasound images were saved. 1% lidocaine was administered subcutaneously for local anesthesia. Under real-time ultrasound guidance, a 21 gauge access needle was advanced into the intraluminal thrombus. Ultrasound images were saved. Numerous attempts were made to pass an 018 guidewire centrally through the thrombus into the venous lumen without success. This was attempted multiple times and was unsuccessful.   A small left great saphenous venous collateral was then identified in the more inferomedial left thigh and was identified as patent and compressible. Ultrasound images were saved. 1% lidocaine was administered subcutaneously for local anesthesia. Under real-time ultrasound guidance, a 21 gauge access needle was utilized to access this left great saphenous venous collateral using micropuncture technique. Ultrasound images were saved. There is spontaneous return of blood through the micropuncture needle. An 018 guidewire was then advanced centrally and was shown to course along the left common femoral vein, and subsequently the external and common iliac veins. Over the access wire, a 4 Welsh micropuncture sheath was placed.   An initial left external iliac venogram was performed demonstrating antegrade flow through the left external and common iliac veins. A subsequent DSA was performed through the micropuncture sheath demonstrating patency of the inferior vena cava with multiple venous webs and stenosis at both the L1-L2 intervertebral space, and the inferior cavoatrial junction.   An 035 guidewire was then placed through the micropuncture sheath and after serial dilation, a 13 Welsh by 20 cm Trialysis temporary hemodialysis catheter was placed. A completion fluoroscopic image demonstrates the catheter in the left common iliac vein.   Catheter lumens easily aspirated and  flushed. Large-bore dialysis lumens were locked with a concentrated heparinized solution (1000 units/cc). The small bore 3rd lumen was locked with a dilute heparinized solution. The catheter hub was sutured to the skin with 2 0 Prolene stay suture. A sterile dressing was applied.   SEDATION/MEDICATIONS: Continuous cardiopulmonary monitoring was performed by a radiology nurse for the duration of the procedure.  1 mg Versed and   50 mcg Fentanyl were administered for moderate conscious sedation time of 30 minutes.      10 cc 1% Lidocaine was administered subcutaneously for local anesthesia. SPECIMENS: None. ESTIMATED BLOOD LOSS:  5 cc FLOUROSCOPY:  1.7 minutes; DAP  79446 mGy-cm*2; Air Kerma  68.37 mGy CONTRAST: 10 cc Omnipaque 350   FINDINGS: Test       1. 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 2. Ultrasound and fluoroscopically guided left groin temporary hemodialysis catheter, ready for use.   PLAN: Consider conversion of the left groin temporary access to semi permanent tunneled venous access when clinically appropriate, versus replacement of a tunneled hemodialysis catheter in the right groin   MACRO: None   Signed by: Ronaldo Dempsey 1/19/2024 3:36 PM Dictation workstation:   [id number]    NM hepatobiliary    Result Date: 1/18/2024  Interpreted By:  Nessa Ayala and Meyers Emily STUDY: NM HEPATOBILIARY;  1/18/2024 9:33 am   INDICATION: Signs/Symptoms:gallbladder thickening.   COMPARISON: CT chest abdomen pelvis 01/14/2024, gallbladder ultrasound 01/14/2024   ACCESSION NUMBER(S): [id number]   ORDERING CLINICIAN: KIERRA RAY   TECHNIQUE: DIVISION OF NUCLEAR MEDICINE HEPATOBILIARY SCAN (HIDA)   The patient received an intravenous dose of  5.5 mCi of Tc-99m mebrofenin (Choletec). A single static image was obtained at 9:05 a.m..   FINDINGS: Upon initial attempts at imaging, IV was noted to be infiltrated. Patient was then sent back to his room until a new IV line could be placed.  After successful placement of a new IV line, a single static image was obtained prior to reinjection.   A single static image obtained at 9:05 a.m. demonstrates radiotracer uptake within the liver, gallbladder, and small bowel.       1. Limited examination secondary to technical difficulties with a single static image approximately 90 minutes post-injection with radiotracer uptake within the liver, gallbladder, and small bowel, findings exclude acute cholecystitis.   I personally reviewed the images/study, and I agree with the findings as stated above. This study was interpreted at [place], [place].   MACRO: None   Signed by: Nessa Ayala 1/18/2024 9:53 AM Dictation workstation:   [id number]    NM injection no scan    Result Date: 1/18/2024  These images are not reportable by radiology and will not be interpreted by  Radiologists.    XR abdomen 1 view    Result Date: 1/17/2024  Interpreted By:  Dean Stanton, STUDY: XR ABDOMEN 1 VIEW;  1/17/2024 11:00 am   INDICATION: Signs/Symptoms:abd pain.   COMPARISON: None.   ACCESSION NUMBER(S): [id number]   ORDERING CLINICIAN: BRADEN QUIROGA   FINDINGS: A KUB is obtained. A nonspecific nonobstructive bowel gas pattern. Air-filled distended stomach. Apparent osteopenia.       1. A nonspecific, nonobstructive bowel gas pattern.   MACRO: None   Signed by: Dean Stanton 1/17/2024 11:29 AM Dictation workstation:   [id number]    Transthoracic Echo (TTE) Complete    Result Date: 1/16/2024   [facility], [address] [place] [address]              Tel [phone] and Fax [phone] TRANSTHORACIC ECHOCARDIOGRAM REPORT  Patient Name:     XIOMARA Maya Physician:  [id number] Edward Hernandez DO Study Date:       1/16/2024           Ordering Provider:  [id number] CHRIS MONTES MRN/PID:          [id number]            Fellow: Accession#:       [id number]         Nurse: Date of           1985 / 38      Sonographer:        Sonja Gonzalez RDCS Birth/Age:        years Gender:           M                   Additional Staff: Height:           175.00 cm           Admit Date:         1/14/2024 Weight:           72.00 kg            Admission Status:   Inpatient - Routine BSA:              1.87 m2             Encounter#:         [id number]                                       Department          [facility]                                       Location: Blood Pressure: 110 /69 mmHg Study Type:    TRANSTHORACIC ECHO (TTE) COMPLETE Diagnosis/ICD: Bacteremia-R78.81 Indication:    Bacteremia Patient History: Pertinent History: HTN, Hyperlipidemia and PVD. Study Detail: The following Echo studies were performed: 2D, M-Mode, Doppler and               color flow. Image quality for this study is poor. Technically               challenging study due to body habitus and patient lying in supine               position. Definity used as a contrast agent for endocardial border               definition. Total contrast used for this procedure was 3 mL via IV               push.  PHYSICIAN INTERPRETATION: Left Ventricle: The left ventricular systolic function is normal, with an estimated ejection fraction of 65-70%. The calculated ejection fraction is normal at 68 % using the Briseno's Bi-plane MOD calculation. There are no regional wall motion abnormalities. The left ventricular cavity size is normal. The left ventricular septal wall thickness is normal. There is normal left ventricular posterior wall thickness. Spectral Doppler shows an impaired relaxation pattern of left ventricular diastolic filling. There is an elevated mean left atrial pressure. There is no definite left ventricular thrombus visualized. Left Atrium: The left atrium was not assessed. Right Ventricle: The right ventricle was not well visualized. There is normal right ventricular global systolic function. Right Atrium:  The right atrium is normal in size. Aortic Valve: The aortic valve is trileaflet. There is mild aortic valve cusp calcification. Aortic valve regurgitation was not assessed. The peak instantaneous gradient of the aortic valve is 3.5 mmHg. Mitral Valve: The mitral valve is mildly thickened. Mitral valve regurgitation was not assessed. Tricuspid Valve: The tricuspid valve is structurally normal. There is trace tricuspid regurgitation. The Doppler estimated RVSP is within normal limits at 15.7 mmHg. Pulmonic Valve: The pulmonic valve is not well visualized. Pulmonic valve regurgitation was not assessed. Pericardium: Pericardial effusion was not assessed. Aorta: The aortic root was not assessed. Systemic Veins: The inferior vena cava size appears small. In comparison to the previous echocardiogram(s): Compared with study from 11/14/2023,. Again, poor echocardiographic imaging. Within the limitations of the study, valvular anatomy and function appear similar. There has not been progression of valvular regurgitation. Study however is not of sufficient quality to fully exclude active endocarditis. Consider POLI based on clinical suspicion.  CONCLUSIONS:  1. Poorly visualized anatomical structures due to suboptimal image quality.  2. Left ventricular systolic function is normal with a 65-70% estimated ejection fraction.  3. Spectral Doppler shows an impaired relaxation pattern of left ventricular diastolic filling.  4. No left ventricular thrombus visualized.  5. There is an elevated mean left atrial pressure.  6. RVSP within normal limits.  7. Again, poor echocardiographic imaging. Within the limitations of the study, valvular anatomy and function appear similar. There has not been progression of valvular regurgitation. Study however is not of sufficient quality to fully exclude active endocarditis. Consider POLI based on clinical suspicion. RECOMMENDATIONS: Transthoracic echo has low negative predictive value for mass,  vegetation, or embolic source. Consider POLI or MRI if clinically indicated.  QUANTITATIVE DATA SUMMARY: 2D MEASUREMENTS:                          Normal Ranges: IVSd:          0.90 cm   (0.6-1.1cm) LVPWd:         0.90 cm   (0.6-1.1cm) LVIDd:         4.20 cm   (3.9-5.9cm) LVIDs:         2.70 cm LV Mass Index: 63.4 g/m2 LV % FS        35.7 % LV SYSTOLIC FUNCTION BY 2D PLANIMETRY (MOD):                     Normal Ranges: EF-A4C View: 67.4 % (>=55%) EF-A2C View: 68.3 % EF-Biplane:  68.1 % LV DIASTOLIC FUNCTION:                               Normal Ranges: MV Peak E:        0.42 m/s    (0.7-1.2 m/s) MV Peak A:        0.61 m/s    (0.42-0.7 m/s) E/A Ratio:        0.69        (1.0-2.2) MV e'             0.03 m/s    (>8.0) MV lateral e'     0.06 m/s MV medial e'      0.03 m/s MV A Dur:         135.00 msec E/e' Ratio:       14.00       (<8.0) PulmV Sys Nilesh:    41.10 cm/s PulmV Munroe Nilesh:   18.60 cm/s PulmV S/D Nilesh:    2.20 PulmV A Revs Nilesh: 20.40 cm/s PulmV A Revs Dur: 103.00 msec MITRAL VALVE:                Normal Ranges: MV DT: 79 msec (150-240msec) AORTIC VALVE:                        Normal Ranges: AoV Vmax:     0.94 m/s (<=1.7m/s) AoV Peak PG:  3.5 mmHg (<20mmHg) LVOT Max Nilesh: 0.63 m/s (<=1.1m/s)  RIGHT VENTRICLE: TAPSE: 17.4 mm TRICUSPID VALVE/RVSP:                             Normal Ranges: Peak TR Velocity: 1.78 m/s Est. RA Pressure: 3 mmHg RV Syst Pressure: 15.7 mmHg (< 30mmHg) IVC Diam:         0.84 cm Pulmonary Veins: PulmV A Revs Dur: 103.00 msec PulmV A Revs Nilesh: 20.40 cm/s PulmV Munroe Nilesh:   18.60 cm/s PulmV S/D Nilesh:    2.20 PulmV Sys Nilesh:    41.10 cm/s  [id number] Edward Hernandez DO Electronically signed on 1/16/2024 at 6:24:23 PM  ** Final **     Electrocardiogram, 12-lead PRN ACS symptoms    Result Date: 1/16/2024  Normal sinus rhythm Low voltage QRS Prolonged QT Abnormal ECG Confirmed by Gerardo Jimenez ([id number]) on 1/16/2024 11:43:42 AM    Electrocardiogram, 12-lead PRN ACS symptoms    Result Date:  1/15/2024  Sinus tachycardia with Fusion complexes Left axis deviation Septal infarct (cited on or before 14-JAN-2024) T wave abnormality, consider lateral ischemia Abnormal ECG When compared with ECG of 14-JAN-2024 13:12, (unconfirmed) Fusion complexes are now Present QRS duration has increased Serial changes of Septal infarct Present    ECG 12 Lead    Result Date: 1/15/2024  See ED provider note for full interpretation and clinical correlation    US gallbladder    Result Date: 1/14/2024  Interpreted By:  Houston Conde, STUDY: US GALLBLADDER;  1/14/2024 7:16 pm   INDICATION: Signs/Symptoms:Vomiting, fever, gallbladder edema on CT, assess for cholecystitis.   COMPARISON: None.   ACCESSION NUMBER(S): [id number]   ORDERING CLINICIAN: BANDAR JANG   TECHNIQUE: Multiple sonographic grayscale and color images of the right upper quadrant were performed.   FINDINGS: The liver demonstrates normal echogenicity. There is gallbladder wall thickening measuring 10 mm in thickness and pericholecystic edema. No definite evidence of gallstones. Per the sonographer, sonographic Muñiz sign is absent. The common bile duct measures 3 mm in diameter.   There is limited evaluation of the pancreas secondary to shadowing from overlying bowel gas.   The right kidney measures 8.6 cm in length. No right hydronephrosis.       Prominent gallbladder wall thickening measuring 10 mm in thickness and pericholecystic edema. There is however no definite evidence of gallstones and per the sonographer, sonographic Muñiz sign is absent. The gallbladder wall thickening is therefore nonspecific and may relate to underlying liver disease, hypoproteinemia or cardiac disease. Clinical correlation is recommended and if there is concern for acalculous cholecystitis, HIDA scan may be obtained for further evaluation.   MACRO: None   Signed by: Houston Conde 1/14/2024 7:32 PM Dictation workstation:   [id number]    CT chest abdomen pelvis wo IV  contrast    Result Date: 1/14/2024  Interpreted By:  Houston Conde, STUDY: CT CHEST ABDOMEN PELVIS WO CONTRAST;  1/14/2024 4:58 pm   INDICATION: Signs/Symptoms:sob and pain.   COMPARISON: 2/9/2023   ACCESSION NUMBER(S): [id number]   ORDERING CLINICIAN: RADHA VALDEZ   TECHNIQUE: Contiguous axial images of the chest, abdomen and pelvis were obtained without intravenous contrast. Coronal and sagittal reformatted images were obtained from the axial images.   FINDINGS: CT CHEST:   The examination is limited secondary lack of intravenous contrast.   There is limited evaluation of the vascular structures on the noncontrast examination. There is air in the left subclavian vasculature in the left arm and also vasculature in the imaged lower neck.  There is minimal air in the central pulmonary vein. There is also air in vasculature in the chest wall.   Hyperdensity in vasculature in superior mediastinum may relate to vascular calcification.   Prominent lymph nodes in the imaged lower neck measures 1.4 cm mildly prominent axillary lymph nodes. Evaluation for mediastinal and hilar lymphadenopathy is limited on noncontrast examination. There are multiple prominent superior mediastinal lymph nodes which measure up to 1.8 cm. 1.9 cm subcarinal lymph node. There also prominent periaortic lymph nodes which measure up to 1.3 cm.   The heart is normal in size. Coronary artery atherosclerotic calcifications. There is moderate size left pleural effusion which appears partially loculated and small right pleural effusion. There is bibasilar atelectatic/consolidative opacity. There is a new 2 cm x 0.9 cm subpleural opacity in the right lower lobe with mild calcifications. No pneumothorax.   Mild bilateral gynecomastia.   No acute fracture of the thoracic spine.   CT ABDOMEN PELVIS:   Evaluation the abdomen pelvis is limited secondary to lack of intravenous contrast. Limited evaluation for liver mass on noncontrast examination. There is  gallbladder wall thickening and pericholecystic edema.   Limited evaluation the pancreas secondary to lack of intravenous contrast. There is a tubular opacity measuring 5 mm in diameter the pancreatic tail may relate to distal pancreatic duct dilatation.   No splenomegaly.   The adrenal glands appear unremarkable.   There are multiple bilateral renal cysts and subcentimeter hypodensities too small to characterize. There are also several in indeterminate renal lesions may correspond to proteinaceous/hemorrhagic cysts however there is limited evaluation for renal mass on noncontrast examination. Bilateral renal vascular calcifications and nonobstructive calculi. No hydronephrosis.   Atherosclerotic calcification of the aorta and abdominal and pelvic vasculature.   There are multiple prominent retroperitoneal lymph nodes which are also seen on the prior examination and measured 1.4 cm enlarged right inguinal lymph node is stable measures 2.2 cm and 1.3 cm.   There is partially calcified density subcutaneous fat in the left anterior abdominal wall which is more density in comparison to prior examination. There is subcutaneous edema in the abdominal and pelvic wall. There is air in vasculature in the abdominal wall.   The stomach is underdistended and not well evaluated. No evidence of bowel obstruction. Evaluation the bowel is overall limited on the noncontrast examination.   Underdistention versus urinary bladder wall thickening.   There is a right femoral central venous catheter which terminates in the chest at level of the SVC.   Multilevel degenerative change of the lumbar spine and changes of renal osteodystrophy. Multilevel Schmorl's nodes.       1.   Air in vasculature in the chest, abdomen and pelvis as described above which may relate to vascular access, however clinical correlation is recommended if there is concern for other etiologies.   2. Moderate size partially loculated left pleural effusion and small  right pleural effusion and bilateral atelectatic/consolidative opacities. New 2 cm x 0.9 cm subpleural opacity right lower lobe with mild calcifications.   3.  Right femoral central venous catheter which extends into the chest and terminates in the SVC.   4.  Lymphadenopathy in the imaged lower neck and mediastinal lymphadenopathy. Multiple prominent retroperitoneal lymph nodes are also noted and enlarged inguinal lymph nodes. The lymph nodes may be reactive in nature, however clinical correlation is recommended there is concern for malignancy or other etiology.   5.   Gallbladder wall thickening and pericholecystic edema is nonspecific and may relate to underlying liver disease, hypoproteinemia or cardiac disease, however if there is concern for acute cholecystitis/acute gallbladder pathology, right upper quadrant ultrasound is recommended for further evaluation.   6.   Low attenuation tubular opacity in the pancreatic tail measuring 5 mm in diameter may correspond to dilated pancreatic duct and may be better assessed on follow-up contrast enhanced imaging.   7.  Bilateral renal atrophy and multiple bilateral renal cysts and subcentimeter hypodensities too small to characterize. Limited evaluation for renal mass on noncontrast examination.   8.  Extensive atherosclerotic calcification in the chest, abdomen and pelvis.   9.  Underdistention versus urinary bladder wall thickening; please correlate urinalysis to evaluate for cystitis.   MACRO: None   Signed by: Houston Conde 1/14/2024 6:05 PM Dictation workstation:   [id number]    XR chest 2 views    Result Date: 1/14/2024  Interpreted By:  Jarvis Holcomb, STUDY: Chest dated  1/14/2024.   INDICATION: Signs/Symptoms:pain   COMPARISON: Chest dated 11/10/2023.   ACCESSION NUMBER(S): [id number]   ORDERING CLINICIAN: RADHA VALDEZ   TECHNIQUE: One view of the chest.   FINDINGS: There is left basilar opacity with blunting of the costophrenic angle. Linear opacities are  seen in the right lower lung zone.  No pneumothorax is evident. The cardiomediastinal silhouette is  not enlarged.Degenerative change is seen of the spine and shoulders.       1. Small left pleural effusion with associated atelectasis and/or pneumonitis. 2. Right basilar atelectasis and/or scarring.   MACRO: None   Signed by: Jarvis Holcomb 1/14/2024 2:51 PM Dictation workstation:   [id number]                 Assessment/Plan   Principal Problem:    Septic shock (CMS/HCC)    IMPRESSION  TYPE 2 DIABETES MELLITUS  LONG TERM CURRENT INSULIN USE  Complicated by ESRD, amputations  Low insulin requirement  Denies history of DKA  Fasting hyperglycemia despite increased dose of Lantus         RECOMMENDATIONS  To Increase Insulin glargine to 12 units at bedtime tonight  To change insulin correction scale TID AC to 3 unit increments   Diabetic diet as tolerated   Accu-Cheks ACHS    Hypoglycemic protocol   Will continue to follow        Gibson Fortune MD

## 2024-01-21 NOTE — PROGRESS NOTES
"  INFECTIOUS DISEASE DAILY PROGRESS NOTE    SUBJECTIVE:    No new events. Afebrile. Blood is clearing. POLI negative for endocarditis.    OBJECTIVE:  VITALS (Last 24 Hours)  /72 (BP Location: Left arm, Patient Position: Lying)   Pulse 91   Temp 37.9 °C (100.2 °F) (Oral)   Resp 18   Ht 1.753 m (5' 9\")   Wt 70.3 kg (155 lb)   SpO2 96%   BMI 22.89 kg/m²     PHYSICAL EXAM:  Gen - NAD, in bed  LLE - s/p AKA  Skin - no rashes     ABX: IV Vanc    LABS:  Lab Results   Component Value Date    WBC 6.8 01/21/2024    HGB 7.7 (L) 01/21/2024    HCT 24.6 (L) 01/21/2024    MCV 86 01/21/2024     01/21/2024     Lab Results   Component Value Date    GLUCOSE 455 (HH) 01/21/2024    CALCIUM 8.6 01/21/2024     (L) 01/21/2024    K 3.5 01/21/2024    CO2 26 01/21/2024    CL 93 (L) 01/21/2024    BUN 29 (H) 01/21/2024    CREATININE 5.89 (H) 01/21/2024     Results from last 72 hours   Lab Units 01/21/24  0607   ALBUMIN g/dL 2.5*     Estimated Creatinine Clearance: 16.7 mL/min (A) (by C-G formula based on SCr of 5.89 mg/dL (H)).      ASSESSMENT/PLAN:     CLABSI (HD cath POA) due to Staph aureus - 3rd line infection in the last 3-4 months. HD cath removed 1/16. TTE limited but no endocarditis. Repeat blood cx 1/18 NGTD. POLI negative for endocarditis.  ESRD on HD     IV Vancomycin 750mg with HD through 2/14/24. No labs needed for me.    4 weeks total therapy for complicated Staph aureus bacteremia.    OK for tunneled HD catheter tomorrow.    Will sign off. Please call back with questions. Thanks!    Mat Jauregui MD  [facility]  Office #[phone]    "

## 2024-01-21 NOTE — PROGRESS NOTES
Xiomara Hemphill is a 38 y.o. male on day 7 of admission presenting with Septic shock (CMS/HCC).      Subjective   Chart rev, bs are high and endo is following        Objective     Last Recorded Vitals  /76 (BP Location: Left arm, Patient Position: Lying)   Pulse 90   Temp 37.3 °C (99.1 °F) (Temporal)   Resp 18   Wt 70.3 kg (155 lb)   SpO2 98%   Intake/Output last 3 Shifts:    Intake/Output Summary (Last 24 hours) at 1/21/2024 1613  Last data filed at 1/21/2024 0936  Gross per 24 hour   Intake 120 ml   Output --   Net 120 ml       Admission Weight  Weight: 79.4 kg (175 lb) (01/14/24 1258)    Daily Weight  01/19/24 : 70.3 kg (155 lb)    Image Results  Transesophageal Echo (POLI)     [facility], [address] [place] [address]               Tel [phone] and Fax [phone]    TRANSESOPHAGEAL ECHOCARDIOGRAM REPORT       Patient Name:      XIOMARA HEMPHILL         Reading Physician:    [id number] Luigi Bach MD  Study Date:        1/19/2024            Ordering Provider:    [id number] KIERRA RAY  MRN/PID:           [id number]             Fellow:  Accession#:        [id number]         Nurse:                Ronaldo Woo RN  Date of Birth/Age: 1985 / 38 years Sonographer:          Alejandro Edouard RDCS  Gender:            M                    Additional Staff:     Noah PARRA  Height:            175.26 cm            Admit Date:           1/14/2024  Weight:            70.31 kg             Admission Status:     Inpatient -                                                                Routine  BSA:               1.85 m2              Encounter#:           [id number]                                          Department Location:  [facility]                                                                 Lab  Blood Pressure: 130 /68 mmHg    Study Type:    TRANSESOPHAGEAL ECHO (POLI)  Diagnosis/ICD: Bacteremia-R78.81  Indication:    Bacteremia  CPT Code:      POLI Complete-00357; Doppler Limited-21517; Color Doppler-35311    Patient History:  Allergies:         Cashew Nut.  Smoker:            Current.  Diabetes:          Yes Insulin  Pertinent History: HTN. 38 y.o. male presents for POLI for bacteremia.    Study Detail: The following Echo studies were performed: 2D. Agitated saline                used as a contrast agent for intraseptal flow evaluation. Total                contrast used for this procedure was 10 mL via IV push. Patient's                heart rhythm is sinus tachycardia and normal sinus rhythm. The                patient was sedated.       PHYSICIAN INTERPRETATION:  POLI Details: The POLI probe used was 5177. Technically adequate omniplane transesophageal echocardiogram performed.  POLI Medication: The pharynx was anesthetized with Cetacaine spray and viscous lidocaine. The patient was sedated by Anesthesia; please refer to anesthesia flow sheet for medications used.  POLI Procedure: The probe was passed without difficulty. The patient tolerated the procedure well without any apparent complications.  Left Ventricle: The left ventricular systolic function is normal, with an estimated ejection fraction of 60%. There are no regional wall motion abnormalities. The left ventricular cavity size is normal. Left ventricular diastolic filling was not assessed.  Left Atrium: The left atrium is normal in size. A bubble study using agitated saline was performed. Bubble study is negative. There is no thrombus visualized in the left atrial appendage and the left atrial appendage Doppler velocities are mildly reduced.  Right Ventricle: The right ventricle is normal in size. There is normal right ventricular global systolic function.  Right Atrium: The right atrium is normal in size.  Aortic Valve: The aortic valve is  trileaflet. There is no evidence of aortic valve regurgitation.  Mitral Valve: The mitral valve is normal in structure. There is no evidence of mitral valve regurgitation.  Tricuspid Valve: The tricuspid valve is structurally normal. There is trace tricuspid regurgitation.  Pulmonic Valve: The pulmonic valve is structurally normal. There is no indication of pulmonic valve regurgitation.  Pericardium: There is no pericardial effusion noted.  Aorta: The aortic root is normal. There is no plaque visualized in the descending aorta.       CONCLUSIONS:   1. Left ventricular systolic function is normal with a 60% estimated ejection fraction.   2. No definite valvular vegetations were visualized.    QUANTITATIVE DATA SUMMARY:     [id number] Luigi Bach MD  Electronically signed on 1/19/2024 at 7:32:42 PM       ** Final **  IR CVC nontunneled  Narrative: Interpreted By:  Ronaldo Dempsey,   STUDY:  IR CVC NONTUNNELED; ;  1/19/2024 12:31 pm  1. ULTRASOUND-GUIDED LEFT GROIN VENOUS ACCESS  2. LEFT COMMON ILIAC AND INFERIOR VENA CAVAGRAM  3. ULTRASOUND FLUOROSCOPICALLY GUIDED LEFT GROIN TEMPORARY  HEMODIALYSIS CATHETER          INDICATION:  Signs/Symptoms:temp HD cath placement. 38-year-old man end-stage  renal disease, prior right groin tunneled hemodialysis catheter,  bacteremia necessitating line holiday. He requires temporary  hemodialysis access.      COMPARISON:  CT chest abdomen pelvis 01/14/2024      ACCESSION NUMBER(S):  [id number]      ORDERING CLINICIAN:  ALKA VIEYRA      TECHNIQUE:      ATTENDING : Ronaldo Dempsey M.D.      TECHNICAL DESCRIPTION/FINDINGS: The procedure, including all risks,  benefits and alternatives were explained to the patient in detail.  All questions were answered and written informed consent was obtained.      The patient was positioned supine on the fluoroscopy table. A  time-out was performed.      The bilateral groins were prepped and draped in usual sterile  fashion. Focused  ultrasound was performed of the bilateral common  femoral veins demonstrating intraluminal echogenicity with small  calcification indicating chronic clot. Ultrasound images were saved.      The decision was made to place the temporary catheter in the left  contralateral access as the recently infected catheter. Focused  ultrasound was performed of the left common femoral vein  demonstrating intraluminal echogenicity and partial compressibility.  Ultrasound images were saved. 1% lidocaine was administered  subcutaneously for local anesthesia. Under real-time ultrasound  guidance, a 21 gauge access needle was advanced into the intraluminal  thrombus. Ultrasound images were saved. Numerous attempts were made  to pass an 018 guidewire centrally through the thrombus into the  venous lumen without success. This was attempted multiple times and  was unsuccessful.      A small left great saphenous venous collateral was then identified in  the more inferomedial left thigh and was identified as patent and  compressible. Ultrasound images were saved. 1% lidocaine was  administered subcutaneously for local anesthesia. Under real-time  ultrasound guidance, a 21 gauge access needle was utilized to access  this left great saphenous venous collateral using micropuncture  technique. Ultrasound images were saved. There is spontaneous return  of blood through the micropuncture needle. An 018 guidewire was then  advanced centrally and was shown to course along the left common  femoral vein, and subsequently the external and common iliac veins.  Over the access wire, a 4 Slovak micropuncture sheath was placed.      An initial left external iliac venogram was performed demonstrating  antegrade flow through the left external and common iliac veins. A  subsequent DSA was performed through the micropuncture sheath  demonstrating patency of the inferior vena cava with multiple venous  webs and stenosis at both the L1-L2 intervertebral space,  and the  inferior cavoatrial junction.      An 035 guidewire was then placed through the micropuncture sheath and  after serial dilation, a 13 Thai by 20 cm Trialysis temporary  hemodialysis catheter was placed. A completion fluoroscopic image  demonstrates the catheter in the left common iliac vein.      Catheter lumens easily aspirated and flushed. Large-bore dialysis  lumens were locked with a concentrated heparinized solution (1000  units/cc). The small bore 3rd lumen was locked with a dilute  heparinized solution. The catheter hub was sutured to the skin with 2  0 Prolene stay suture. A sterile dressing was applied.      SEDATION/MEDICATIONS: Continuous cardiopulmonary monitoring was  performed by a radiology nurse for the duration of the procedure.  1  mg Versed and   50 mcg Fentanyl were administered for moderate  conscious sedation time of 30 minutes.      10 cc 1% Lidocaine was  administered subcutaneously for local anesthesia. SPECIMENS: None.  ESTIMATED BLOOD LOSS:  5 cc  FLOUROSCOPY:  1.7 minutes; DAP  23876 mGy-cm*2; Air Kerma  68.37 mGy  CONTRAST: 10 cc Omnipaque 350      FINDINGS:  Test      Impression: 1. 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  2. Ultrasound and fluoroscopically guided left groin temporary  hemodialysis catheter, ready for use.      PLAN:  Consider conversion of the left groin temporary access to semi  permanent tunneled venous access when clinically appropriate, versus  replacement of a tunneled hemodialysis catheter in the right groin      MACRO:  None      Signed by: Ronaldo Dempsey 1/19/2024 3:36 PM  Dictation workstation:   [id number]      PHYSICAL EXAM  NEUROLOGICAL: No abnormal movements, no changes from before  HEENT: Head atraumatic, perrl,eomi, no oral lesions, no ear rash   NECK: Supple, no ln, jvp flat, thyroid palp  HEART: S1, S2, no added sound  LUNGS: CTAB, no crackles, no rales, no wheezing, no dullness to percussion, sym  exp  EXTREMITIES: no edema  ABDOMEN: Soft, nontender, bowel sound positive, no organomegaly  SKIN: No change  EYES: No changes, perrl, eomi,   ENT: No change    JOINT: No change  Relevant Results               Assessment/Plan        This patient has a central line   Reason for the central line remaining today? Dialysis/Hemapheresis      Esrd  Pvd   Anemia   T2dm uncontrolled --> endo input appreciated       Principal Problem:    Septic shock (CMS/HCC)    For tunneled cath in am and dc post   t33              Mauricio Estrada MD

## 2024-01-22 ENCOUNTER — APPOINTMENT (OUTPATIENT)
Dept: CARDIOLOGY | Facility: HOSPITAL | Age: 39
DRG: 314 | End: 2024-01-22
Payer: COMMERCIAL

## 2024-01-22 VITALS
RESPIRATION RATE: 17 BRPM | SYSTOLIC BLOOD PRESSURE: 123 MMHG | DIASTOLIC BLOOD PRESSURE: 78 MMHG | OXYGEN SATURATION: 100 % | WEIGHT: 155 LBS | TEMPERATURE: 98.1 F | HEART RATE: 111 BPM | HEIGHT: 69 IN | BODY MASS INDEX: 22.96 KG/M2

## 2024-01-22 LAB
ALBUMIN SERPL BCP-MCNC: 2.4 G/DL (ref 3.4–5)
ANION GAP SERPL CALC-SCNC: 13 MMOL/L (ref 10–20)
AORTIC VALVE PEAK VELOCITY: 0.94
AV PEAK GRADIENT: 3.5
BACTERIA BLD CULT: NORMAL
BODY SURFACE AREA: 1.85 M2
BUN SERPL-MCNC: 32 MG/DL (ref 6–23)
CALCIUM SERPL-MCNC: 8.9 MG/DL (ref 8.6–10.3)
CHLORIDE SERPL-SCNC: 94 MMOL/L (ref 98–107)
CO2 SERPL-SCNC: 29 MMOL/L (ref 21–32)
CREAT SERPL-MCNC: 6.81 MG/DL (ref 0.5–1.3)
EGFRCR SERPLBLD CKD-EPI 2021: 10 ML/MIN/1.73M*2
EJECTION FRACTION APICAL 4 CHAMBER: 67.4
EJECTION FRACTION: 68
ERYTHROCYTE [DISTWIDTH] IN BLOOD BY AUTOMATED COUNT: 21.2 % (ref 11.5–14.5)
GLUCOSE BLD MANUAL STRIP-MCNC: 296 MG/DL (ref 74–99)
GLUCOSE BLD MANUAL STRIP-MCNC: 335 MG/DL (ref 74–99)
GLUCOSE BLD MANUAL STRIP-MCNC: 364 MG/DL (ref 74–99)
GLUCOSE BLD MANUAL STRIP-MCNC: 364 MG/DL (ref 74–99)
GLUCOSE SERPL-MCNC: 247 MG/DL (ref 74–99)
HCT VFR BLD AUTO: 25.6 % (ref 41–52)
HGB BLD-MCNC: 7.7 G/DL (ref 13.5–17.5)
LEFT VENTRICLE INTERNAL DIMENSION DIASTOLE: 4.2 (ref 3.5–6)
MCH RBC QN AUTO: 25.9 PG (ref 26–34)
MCHC RBC AUTO-ENTMCNC: 30.1 G/DL (ref 32–36)
MCV RBC AUTO: 86 FL (ref 80–100)
MITRAL VALVE E/A RATIO: 0.69
MITRAL VALVE E/E' RATIO: 14
NRBC BLD-RTO: 0 /100 WBCS (ref 0–0)
PHOSPHATE SERPL-MCNC: 3.8 MG/DL (ref 2.5–4.9)
PLATELET # BLD AUTO: 194 X10*3/UL (ref 150–450)
POTASSIUM SERPL-SCNC: 4.1 MMOL/L (ref 3.5–5.3)
RBC # BLD AUTO: 2.97 X10*6/UL (ref 4.5–5.9)
RIGHT VENTRICLE PEAK SYSTOLIC PRESSURE: 15.7
SODIUM SERPL-SCNC: 132 MMOL/L (ref 136–145)
TRICUSPID ANNULAR PLANE SYSTOLIC EXCURSION: 1.7
VANCOMYCIN SERPL-MCNC: 14.7 UG/ML (ref 5–20)
WBC # BLD AUTO: 6.2 X10*3/UL (ref 4.4–11.3)

## 2024-01-22 PROCEDURE — 99153 MOD SED SAME PHYS/QHP EA: CPT | Performed by: RADIOLOGY

## 2024-01-22 PROCEDURE — 99231 SBSQ HOSP IP/OBS SF/LOW 25: CPT | Performed by: INTERNAL MEDICINE

## 2024-01-22 PROCEDURE — 77001 FLUOROGUIDE FOR VEIN DEVICE: CPT | Performed by: RADIOLOGY

## 2024-01-22 PROCEDURE — 82947 ASSAY GLUCOSE BLOOD QUANT: CPT

## 2024-01-22 PROCEDURE — 85027 COMPLETE CBC AUTOMATED: CPT | Performed by: NURSE PRACTITIONER

## 2024-01-22 PROCEDURE — 36558 INSERT TUNNELED CV CATH: CPT

## 2024-01-22 PROCEDURE — 77001 FLUOROGUIDE FOR VEIN DEVICE: CPT

## 2024-01-22 PROCEDURE — 99152 MOD SED SAME PHYS/QHP 5/>YRS: CPT

## 2024-01-22 PROCEDURE — 2500000004 HC RX 250 GENERAL PHARMACY W/ HCPCS (ALT 636 FOR OP/ED): Performed by: RADIOLOGY

## 2024-01-22 PROCEDURE — 2780000003 HC OR 278 NO HCPCS

## 2024-01-22 PROCEDURE — 2500000004 HC RX 250 GENERAL PHARMACY W/ HCPCS (ALT 636 FOR OP/ED): Performed by: NURSE PRACTITIONER

## 2024-01-22 PROCEDURE — 0JHL3XZ INSERTION OF TUNNELED VASCULAR ACCESS DEVICE INTO RIGHT UPPER LEG SUBCUTANEOUS TISSUE AND FASCIA, PERCUTANEOUS APPROACH: ICD-10-PCS | Performed by: RADIOLOGY

## 2024-01-22 PROCEDURE — 36558 INSERT TUNNELED CV CATH: CPT | Performed by: RADIOLOGY

## 2024-01-22 PROCEDURE — 80069 RENAL FUNCTION PANEL: CPT | Performed by: INTERNAL MEDICINE

## 2024-01-22 PROCEDURE — C1894 INTRO/SHEATH, NON-LASER: HCPCS

## 2024-01-22 PROCEDURE — 99152 MOD SED SAME PHYS/QHP 5/>YRS: CPT | Performed by: RADIOLOGY

## 2024-01-22 PROCEDURE — 76937 US GUIDE VASCULAR ACCESS: CPT

## 2024-01-22 PROCEDURE — C1892 INTRO/SHEATH,FIXED,PEEL-AWAY: HCPCS

## 2024-01-22 PROCEDURE — 99153 MOD SED SAME PHYS/QHP EA: CPT

## 2024-01-22 PROCEDURE — 93005 ELECTROCARDIOGRAM TRACING: CPT

## 2024-01-22 PROCEDURE — 6350000001 HC RX 635 EPOETIN >10,000 UNITS: Mod: JZ | Performed by: NURSE PRACTITIONER

## 2024-01-22 PROCEDURE — 80202 ASSAY OF VANCOMYCIN: CPT | Performed by: ANESTHESIOLOGY

## 2024-01-22 PROCEDURE — 06HY33Z INSERTION OF INFUSION DEVICE INTO LOWER VEIN, PERCUTANEOUS APPROACH: ICD-10-PCS | Performed by: RADIOLOGY

## 2024-01-22 PROCEDURE — 76937 US GUIDE VASCULAR ACCESS: CPT | Performed by: RADIOLOGY

## 2024-01-22 PROCEDURE — 2720000007 HC OR 272 NO HCPCS

## 2024-01-22 PROCEDURE — 2500000002 HC RX 250 W HCPCS SELF ADMINISTERED DRUGS (ALT 637 FOR MEDICARE OP, ALT 636 FOR OP/ED): Performed by: INTERNAL MEDICINE

## 2024-01-22 PROCEDURE — C1769 GUIDE WIRE: HCPCS

## 2024-01-22 PROCEDURE — 2500000005 HC RX 250 GENERAL PHARMACY W/O HCPCS: Performed by: RADIOLOGY

## 2024-01-22 PROCEDURE — C1750 CATH, HEMODIALYSIS,LONG-TERM: HCPCS

## 2024-01-22 RX ORDER — VANCOMYCIN HYDROCHLORIDE 750 MG/150ML
750 INJECTION, SOLUTION INTRAVENOUS ONCE
Status: DISCONTINUED | OUTPATIENT
Start: 2024-01-22 | End: 2024-01-23 | Stop reason: HOSPADM

## 2024-01-22 RX ORDER — HEPARIN SODIUM 1000 [USP'U]/ML
INJECTION, SOLUTION INTRAVENOUS; SUBCUTANEOUS AS NEEDED
Status: DISCONTINUED | OUTPATIENT
Start: 2024-01-22 | End: 2024-01-23 | Stop reason: HOSPADM

## 2024-01-22 RX ORDER — INSULIN GLARGINE 100 [IU]/ML
12 INJECTION, SOLUTION SUBCUTANEOUS NIGHTLY
Qty: 3.6 ML | Refills: 11 | Status: ON HOLD | OUTPATIENT
Start: 2024-01-22 | End: 2024-05-25

## 2024-01-22 RX ORDER — LIDOCAINE HYDROCHLORIDE 10 MG/ML
INJECTION, SOLUTION EPIDURAL; INFILTRATION; INTRACAUDAL; PERINEURAL AS NEEDED
Status: DISCONTINUED | OUTPATIENT
Start: 2024-01-22 | End: 2024-01-23 | Stop reason: HOSPADM

## 2024-01-22 RX ORDER — PANTOPRAZOLE SODIUM 40 MG/1
40 TABLET, DELAYED RELEASE ORAL
Qty: 30 TABLET | Refills: 11
Start: 2024-01-22 | End: 2024-05-26 | Stop reason: HOSPADM

## 2024-01-22 RX ORDER — INSULIN LISPRO 100 [IU]/ML
0-15 INJECTION, SOLUTION INTRAVENOUS; SUBCUTANEOUS
Qty: 13.5 ML | Refills: 11 | Status: SHIPPED | OUTPATIENT
Start: 2024-01-22 | End: 2025-01-21

## 2024-01-22 RX ADMIN — PANTOPRAZOLE SODIUM 40 MG: 40 TABLET, DELAYED RELEASE ORAL at 22:38

## 2024-01-22 RX ADMIN — INSULIN GLARGINE 12 UNITS: 100 INJECTION, SOLUTION SUBCUTANEOUS at 22:49

## 2024-01-22 RX ADMIN — HEPARIN SODIUM 5800 UNITS: 1000 INJECTION, SOLUTION INTRAVENOUS; SUBCUTANEOUS at 14:48

## 2024-01-22 RX ADMIN — EPOETIN ALFA-EPBX 10000 UNITS: 10000 INJECTION, SOLUTION INTRAVENOUS; SUBCUTANEOUS at 22:38

## 2024-01-22 RX ADMIN — LIDOCAINE HYDROCHLORIDE 10 ML: 10 INJECTION, SOLUTION EPIDURAL; INFILTRATION; INTRACAUDAL; PERINEURAL at 14:20

## 2024-01-22 RX ADMIN — FENTANYL CITRATE 50 MCG: 50 INJECTION, SOLUTION INTRAMUSCULAR; INTRAVENOUS at 14:00

## 2024-01-22 RX ADMIN — MIDAZOLAM HYDROCHLORIDE 1 MG: 1 INJECTION INTRAMUSCULAR; INTRAVENOUS at 14:17

## 2024-01-22 RX ADMIN — FENTANYL CITRATE 50 MCG: 50 INJECTION, SOLUTION INTRAMUSCULAR; INTRAVENOUS at 14:18

## 2024-01-22 RX ADMIN — INSULIN LISPRO 15 UNITS: 100 INJECTION, SOLUTION INTRAVENOUS; SUBCUTANEOUS at 17:25

## 2024-01-22 RX ADMIN — SEVELAMER CARBONATE 2400 MG: 800 TABLET, FILM COATED ORAL at 17:25

## 2024-01-22 RX ADMIN — MIDAZOLAM HYDROCHLORIDE 1 MG: 1 INJECTION INTRAMUSCULAR; INTRAVENOUS at 14:00

## 2024-01-22 RX ADMIN — LIDOCAINE HYDROCHLORIDE 10 ML: 10 INJECTION, SOLUTION EPIDURAL; INFILTRATION; INTRACAUDAL; PERINEURAL at 14:28

## 2024-01-22 ASSESSMENT — PAIN - FUNCTIONAL ASSESSMENT: PAIN_FUNCTIONAL_ASSESSMENT: 0-10

## 2024-01-22 ASSESSMENT — COGNITIVE AND FUNCTIONAL STATUS - GENERAL
DRESSING REGULAR UPPER BODY CLOTHING: A LOT
DAILY ACTIVITIY SCORE: 13
TOILETING: A LOT
HELP NEEDED FOR BATHING: A LOT
MOVING FROM LYING ON BACK TO SITTING ON SIDE OF FLAT BED WITH BEDRAILS: A LITTLE
TURNING FROM BACK TO SIDE WHILE IN FLAT BAD: A LOT
STANDING UP FROM CHAIR USING ARMS: TOTAL
DAILY ACTIVITIY SCORE: 13
EATING MEALS: A LITTLE
HELP NEEDED FOR BATHING: A LOT
MOBILITY SCORE: 9
MOVING FROM LYING ON BACK TO SITTING ON SIDE OF FLAT BED WITH BEDRAILS: A LITTLE
MOVING TO AND FROM BED TO CHAIR: A LOT
WALKING IN HOSPITAL ROOM: TOTAL
STANDING UP FROM CHAIR USING ARMS: A LOT
TURNING FROM BACK TO SIDE WHILE IN FLAT BAD: A LOT
MOBILITY SCORE: 11
DRESSING REGULAR UPPER BODY CLOTHING: A LOT
CLIMB 3 TO 5 STEPS WITH RAILING: TOTAL
TOILETING: A LOT
WALKING IN HOSPITAL ROOM: TOTAL
DRESSING REGULAR LOWER BODY CLOTHING: A LOT
EATING MEALS: A LITTLE
PERSONAL GROOMING: A LOT
MOVING TO AND FROM BED TO CHAIR: TOTAL
CLIMB 3 TO 5 STEPS WITH RAILING: TOTAL
DRESSING REGULAR LOWER BODY CLOTHING: A LOT
PERSONAL GROOMING: A LOT

## 2024-01-22 ASSESSMENT — PAIN SCALES - GENERAL: PAINLEVEL_OUTOF10: 0 - NO PAIN

## 2024-01-22 NOTE — CARE PLAN
The patient's goals for the shift include      The clinical goals for the shift include patient will maintain skin integrity this shift      Problem: Skin  Goal: Prevent/minimize sheer/friction injuries  Outcome: Progressing  Goal: Promote/optimize nutrition  Outcome: Progressing  Goal: Promote skin healing  Outcome: Progressing     Problem: Safety  Goal: Patient will be injury free during hospitalization  Outcome: Progressing     Problem: Daily Care  Goal: Daily care needs are met  Outcome: Progressing

## 2024-01-22 NOTE — PRE-PROCEDURE NOTE
Interventional Radiology Preprocedure Note    Indication for procedure: The primary encounter diagnosis was Septic shock (CMS/[facility]). Diagnoses of Bacteremia, ESRD (end stage renal disease) on dialysis (CMS/[facility]), Type 1 diabetes mellitus with other specified complication (CMS/[facility]), and Gastroesophageal reflux disease, unspecified whether esophagitis present were also pertinent to this visit.    Relevant review of systems:  denies fever or chills    Relevant Labs:   Lab Results   Component Value Date    CREATININE 6.81 (H) 01/22/2024    EGFR 10 (L) 01/22/2024    INR 1.1 09/13/2023    PROTIME 11.9 09/13/2023       Planned Sedation/Anesthesia: Moderate    Airway assessment: normal    Directed physical examination:    A&Ox3  Breathing Unlabored  RRR  Abdomen soft, nontender  Left groin temp line intact.     Mallampati: III (soft and hard palate and base of uvula visible)    ASA Score: ASA 4 - Patient with severe systemic disease that is a constant threat to life    Plan for temp to tunnel line placement today. We will attempt to re-access the right fem, as he has a left AKA and the tunneled left line may interfere with his prosthesis. I explained that we are limited to what vascular access he has to offer.     Benefits, risks and alternatives of procedure and planned sedation have been discussed with the patient and/or their representative. All questions answered and they agree to proceed.

## 2024-01-22 NOTE — PROGRESS NOTES
"Vancomycin Dosing by Pharmacy- FOLLOW UP    Edwar Hemphill is a 38 y.o. year old male who Pharmacy has been consulted for vancomycin dosing for pneumonia. Based on the patient's indication and renal status this patient is being dosed based on a goal trough/random level of 15-20.     Renal function is currently stable.    Current vancomycin dose: dose by level- last received 750mg 1/15 @1709. Patient has refused doses on 1/20 and 1/21.    Most recent random level: 14.7 mcg/mL    Visit Vitals  /75 (BP Location: Left arm, Patient Position: Lying)   Pulse 92   Temp 36.3 °C (97.3 °F) (Temporal)   Resp 18        Lab Results   Component Value Date    CREATININE 6.81 (H) 01/22/2024    CREATININE 5.89 (H) 01/21/2024    CREATININE 9.63 (H) 01/20/2024    CREATININE 8.95 (H) 01/19/2024        Patient weight is No results found for: \"PTWEIGHT\"    No results found for: \"CULTURE\"     I/O last 3 completed shifts:  In: 600 (8.5 mL/kg) [P.O.:600]  Out: - (0 mL/kg)   Weight: 70.3 kg   [unfilled]    Lab Results   Component Value Date    PATIENTTEMP 37.0 01/14/2024    PATIENTTEMP 37.0 07/09/2023    PATIENTTEMP 37.0 07/08/2023    PATIENTTEMP 37.0 07/08/2023        Assessment/Plan    Below goal vancomycin trough level. Patient refused doses 1/20 and 1/21. Will attempt to give today if patient willing. Entered order for vancomycin 750mg once.  The next level will be obtained TBD depending on administration.  Will continue to monitor renal function daily while on vancomycin and order serum creatinine at least every 48 hours if not already ordered.  Follow for continued vancomycin needs, clinical response, and signs/symptoms of toxicity.       [place], PharmD           "

## 2024-01-22 NOTE — PROGRESS NOTES
Xiomara Hemphill is a 38 y.o. male on day 8 of admission presenting with Septic shock (CMS/HCC).      Subjective   Had  refused vanco last night        Objective     Last Recorded Vitals  /80 (BP Location: Left arm, Patient Position: Lying)   Pulse 98   Temp 36.7 °C (98.1 °F) (Oral)   Resp 18   Wt 70.3 kg (155 lb)   SpO2 98%   Intake/Output last 3 Shifts:    Intake/Output Summary (Last 24 hours) at 1/22/2024 0921  Last data filed at 1/21/2024 2300  Gross per 24 hour   Intake 600 ml   Output --   Net 600 ml       Admission Weight  Weight: 79.4 kg (175 lb) (01/14/24 1258)    Daily Weight  01/19/24 : 70.3 kg (155 lb)    Image Results  Transesophageal Echo (POLI)     [facility], [address] [place] [address]               Tel [phone] and Fax [phone]    TRANSESOPHAGEAL ECHOCARDIOGRAM REPORT       Patient Name:      XIOMARA HEMPHILL         Reading Physician:    [id number] Luigi Bach MD  Study Date:        1/19/2024            Ordering Provider:    [id number] KIERRA RAY  MRN/PID:           [id number]             Fellow:  Accession#:        [id number]         Nurse:                Ronaldo Woo RN  Date of Birth/Age: 1985 / 38 years Sonographer:          Alejandro Edouard RDCS  Gender:            M                    Additional Staff:     Noah PARRA  Height:            175.26 cm            Admit Date:           1/14/2024  Weight:            70.31 kg             Admission Status:     Inpatient -                                                                Routine  BSA:               1.85 m2              Encounter#:           [id number]                                          Department Location:  [facility]                                                                Lab  Blood  Pressure: 130 /68 mmHg    Study Type:    TRANSESOPHAGEAL ECHO (POLI)  Diagnosis/ICD: Bacteremia-R78.81  Indication:    Bacteremia  CPT Code:      POLI Complete-31778; Doppler Limited-56740; Color Doppler-22789    Patient History:  Allergies:         Cashew Nut.  Smoker:            Current.  Diabetes:          Yes Insulin  Pertinent History: HTN. 38 y.o. male presents for POLI for bacteremia.    Study Detail: The following Echo studies were performed: 2D. Agitated saline                used as a contrast agent for intraseptal flow evaluation. Total                contrast used for this procedure was 10 mL via IV push. Patient's                heart rhythm is sinus tachycardia and normal sinus rhythm. The                patient was sedated.       PHYSICIAN INTERPRETATION:  POLI Details: The POLI probe used was 5177. Technically adequate omniplane transesophageal echocardiogram performed.  POLI Medication: The pharynx was anesthetized with Cetacaine spray and viscous lidocaine. The patient was sedated by Anesthesia; please refer to anesthesia flow sheet for medications used.  POLI Procedure: The probe was passed without difficulty. The patient tolerated the procedure well without any apparent complications.  Left Ventricle: The left ventricular systolic function is normal, with an estimated ejection fraction of 60%. There are no regional wall motion abnormalities. The left ventricular cavity size is normal. Left ventricular diastolic filling was not assessed.  Left Atrium: The left atrium is normal in size. A bubble study using agitated saline was performed. Bubble study is negative. There is no thrombus visualized in the left atrial appendage and the left atrial appendage Doppler velocities are mildly reduced.  Right Ventricle: The right ventricle is normal in size. There is normal right ventricular global systolic function.  Right Atrium: The right atrium is normal in size.  Aortic Valve: The aortic valve is trileaflet.  There is no evidence of aortic valve regurgitation.  Mitral Valve: The mitral valve is normal in structure. There is no evidence of mitral valve regurgitation.  Tricuspid Valve: The tricuspid valve is structurally normal. There is trace tricuspid regurgitation.  Pulmonic Valve: The pulmonic valve is structurally normal. There is no indication of pulmonic valve regurgitation.  Pericardium: There is no pericardial effusion noted.  Aorta: The aortic root is normal. There is no plaque visualized in the descending aorta.       CONCLUSIONS:   1. Left ventricular systolic function is normal with a 60% estimated ejection fraction.   2. No definite valvular vegetations were visualized.    QUANTITATIVE DATA SUMMARY:     [id number] Luigi Bach MD  Electronically signed on 1/19/2024 at 7:32:42 PM       ** Final **  IR CVC nontunneled  Narrative: Interpreted By:  Ronaldo Dempsey,   STUDY:  IR CVC NONTUNNELED; ;  1/19/2024 12:31 pm  1. ULTRASOUND-GUIDED LEFT GROIN VENOUS ACCESS  2. LEFT COMMON ILIAC AND INFERIOR VENA CAVAGRAM  3. ULTRASOUND FLUOROSCOPICALLY GUIDED LEFT GROIN TEMPORARY  HEMODIALYSIS CATHETER          INDICATION:  Signs/Symptoms:temp HD cath placement. 38-year-old man end-stage  renal disease, prior right groin tunneled hemodialysis catheter,  bacteremia necessitating line holiday. He requires temporary  hemodialysis access.      COMPARISON:  CT chest abdomen pelvis 01/14/2024      ACCESSION NUMBER(S):  [id number]      ORDERING CLINICIAN:  ALKA VIEYRA      TECHNIQUE:      ATTENDING : Ronaldo Dempsey M.D.      TECHNICAL DESCRIPTION/FINDINGS: The procedure, including all risks,  benefits and alternatives were explained to the patient in detail.  All questions were answered and written informed consent was obtained.      The patient was positioned supine on the fluoroscopy table. A  time-out was performed.      The bilateral groins were prepped and draped in usual sterile  fashion. Focused ultrasound  was performed of the bilateral common  femoral veins demonstrating intraluminal echogenicity with small  calcification indicating chronic clot. Ultrasound images were saved.      The decision was made to place the temporary catheter in the left  contralateral access as the recently infected catheter. Focused  ultrasound was performed of the left common femoral vein  demonstrating intraluminal echogenicity and partial compressibility.  Ultrasound images were saved. 1% lidocaine was administered  subcutaneously for local anesthesia. Under real-time ultrasound  guidance, a 21 gauge access needle was advanced into the intraluminal  thrombus. Ultrasound images were saved. Numerous attempts were made  to pass an 018 guidewire centrally through the thrombus into the  venous lumen without success. This was attempted multiple times and  was unsuccessful.      A small left great saphenous venous collateral was then identified in  the more inferomedial left thigh and was identified as patent and  compressible. Ultrasound images were saved. 1% lidocaine was  administered subcutaneously for local anesthesia. Under real-time  ultrasound guidance, a 21 gauge access needle was utilized to access  this left great saphenous venous collateral using micropuncture  technique. Ultrasound images were saved. There is spontaneous return  of blood through the micropuncture needle. An 018 guidewire was then  advanced centrally and was shown to course along the left common  femoral vein, and subsequently the external and common iliac veins.  Over the access wire, a 4 Vietnamese micropuncture sheath was placed.      An initial left external iliac venogram was performed demonstrating  antegrade flow through the left external and common iliac veins. A  subsequent DSA was performed through the micropuncture sheath  demonstrating patency of the inferior vena cava with multiple venous  webs and stenosis at both the L1-L2 intervertebral space, and  the  inferior cavoatrial junction.      An 035 guidewire was then placed through the micropuncture sheath and  after serial dilation, a 13 Citizen of [place] by 20 cm Trialysis temporary  hemodialysis catheter was placed. A completion fluoroscopic image  demonstrates the catheter in the left common iliac vein.      Catheter lumens easily aspirated and flushed. Large-bore dialysis  lumens were locked with a concentrated heparinized solution (1000  units/cc). The small bore 3rd lumen was locked with a dilute  heparinized solution. The catheter hub was sutured to the skin with 2  0 Prolene stay suture. A sterile dressing was applied.      SEDATION/MEDICATIONS: Continuous cardiopulmonary monitoring was  performed by a radiology nurse for the duration of the procedure.  1  mg Versed and   50 mcg Fentanyl were administered for moderate  conscious sedation time of 30 minutes.      10 cc 1% Lidocaine was  administered subcutaneously for local anesthesia. SPECIMENS: None.  ESTIMATED BLOOD LOSS:  5 cc  FLOUROSCOPY:  1.7 minutes; DAP  95202 mGy-cm*2; Air Kerma  68.37 mGy  CONTRAST: 10 cc Omnipaque 350      FINDINGS:  Test      Impression: 1. 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  2. Ultrasound and fluoroscopically guided left groin temporary  hemodialysis catheter, ready for use.      PLAN:  Consider conversion of the left groin temporary access to semi  permanent tunneled venous access when clinically appropriate, versus  replacement of a tunneled hemodialysis catheter in the right groin      MACRO:  None      Signed by: Ronaldo Dempsey 1/19/2024 3:36 PM  Dictation workstation:   [id number]      PHYSICAL EXAM  NEUROLOGICAL: No abnormal movements, no changes from before  HEENT: Head atraumatic, perrl,eomi, no oral lesions, no ear rash   NECK: Supple, no ln, jvp flat, thyroid palp  HEART: S1, S2, no added sound  LUNGS: CTAB, no crackles, no rales, no wheezing, no dullness to percussion, sym  exp  EXTREMITIES: no edema  ABDOMEN: Soft, nontender, bowel sound positive, no organomegaly  SKIN: No change  EYES: No changes, perrl, eomi,   ENT: No change    JOINT: No change  Relevant Results               Assessment/Plan        Esrd  Pvd   Anemia   T2dm uncontrolled --> endo input appreciated        Principal Problem:    Septic shock (CMS/HCC)    Dc today post cath placement back to ecf       D/w ID  [id number]       Mauricio Estrada MD

## 2024-01-22 NOTE — PROGRESS NOTES
1/22/2024 4:52 PM I called patient and let him know that he will be discharged at 7:30. I told him that I don't have correct number for his mother. He did not give me her phone number and said he would call her. Lianna HARRIS

## 2024-01-22 NOTE — PROGRESS NOTES
Transitional Care Coordination Progress Note:  Plan per Medical/Surgical team: treatment of sepsis, hyperglycemia, ESRD on chronic dialysis with IV ATB, ID, nephro & endocrine consults  Status: inpatient  Payor source: [employer] dual  Discharge disposition: [facility]  Potential Barriers: TODAY: placement of new dialysis cath awaiting time of IR procedure to arrange transportation for DC today  ADOD: 1/22/2024  MICK Mckinnon RN, BSN Transitional Care Coordinator ED# [phone]     @ 1323 Clarified with LTC, Per ID: IV Vancomycin 750mg with HD through 2/14/24. No labs needed. 4 weeks total therapy for complicated Staph aureus bacteremia. Still awaiting insertion of cath. Question if dialysis needed prior to DC to verify quality function.    @ 1527 Patient returns from successful insertion of new dialysis cath. No dialysis today per nephrology. Patient cleared to DC to SNF today. Patient & family updated on today's DC plan. Transportation arranged per CNS.  SNF notified of DC today with copy of chart in place. report number is [phone] unit 2. MICK Mckinnon RN, BSN Transitional Care Coordinator [phone]

## 2024-01-22 NOTE — CONSULTS
Reason For Consult  ESRD on hemodialysis     History Of Present Illness  Edwar Hemphill is a 38 y.o. male with a past medical history of end-stage renal disease on hemodialysis via right femoral TDC who resides at [facility]. 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  He later had stump debridement on 7/27 with wound VAC placement.  He had polymicrobial infection including resistant E coli, Acinetobacter baumannii and Enterobacter fecalis. He was admitted in September with positive blood cultures growing Acinetobacter baumannii and Stenotrophomonas. He went to  for dialysis line exchange.  He then was admitted again in November where CT noted left sided fluid collection concerning for abscess.  He grew MRSA and Streptococcus, catheter was exchanged on November 13, he completed vancomycin at [facility].      He comes in now with fever, chills, congestion.  Attempt was made at triple-lumen catheter placement in the neck, could not pass a wire.  Was seen by Dr. Bullock, on norepinephrine, vancomycin and Zosyn.  We dialyzed him and removed the dialysis line, he then had a temporary line placed  I reviewed the transesophageal echocardiogram, no vegetation.       He is planned for permcath re-insertion.     Physical Exam   Constitutional: Well developed, awake/alert/oriented  x3   Eyes: Periorbital swelling   ENMT: mucous membranes moist   Head/Neck: Facial edema  Difficult to assess JVD   Respiratory/Thorax: Diminished bibasilar breath sounds,  no wheezing, rales or rhonchi   Cardiovascular: regular rhythm, normal  S1 and S2   Gastrointestinal: Nondistended, soft, non-tender,  no rebound  tenderness or guarding   Genitourinary: No Castanon   Extremities: Less edema on right than prior   L AKA   RUE amputation  R femoral HD catheter   Neurological: No asterixis   Psychological: Appropriate mood and behavior   Skin: L AKA stump  Right femoral TDC           I&O 24HR    Intake/Output Summary (Last 24 hours) at 1/22/2024 1519  Last data filed at 1/22/2024 1506  Gross per 24 hour   Intake 480 ml   Output 5 ml   Net 475 ml         Vitals 24HR  Heart Rate:  [92-98]   Temp:  [35.7 °C (96.3 °F)-36.7 °C (98.1 °F)]   Resp:  [18]   BP: (117-133)/(75-80)   SpO2:  [95 %-99 %]     Scheduled Medications  B complex-vitamin C-folic acid, 1 capsule, oral, Daily  epoetin tyson or biosimilar, 10,000 Units, subcutaneous, Once per day on Mon Wed Fri  heparin (porcine), 5,000 Units, subcutaneous, q8h  heparin, 1,400 Units, intra-catheter, After Dialysis  heparin, 1,400 Units, intra-catheter, After Dialysis  insulin glargine, 12 Units, subcutaneous, Nightly  insulin lispro, 0-15 Units, subcutaneous, TID with meals  levothyroxine, 125 mcg, oral, Daily  lidocaine, 5 mL, infiltration, Once  lidocaine PF, 10 mL, subcutaneous, Once  pantoprazole, 40 mg, oral, BID   Or  pantoprazole, 40 mg, intravenous, BID  perflutren lipid microspheres, 1 mL of dilution, intravenous, Once in imaging  sevelamer carbonate, 2,400 mg, oral, TID with meals  sodium bicarbonate 1 mEq in lidocaine-epinephrine (Xylocaine W/EPI) 1 %-1:100,000 9 mL Syringe, 10 mL, subcutaneous, Once  vancomycin, 750 mg, intravenous, Once  vancomycin, 750 mg, intravenous, Once      Continuous medications       PRN medications: acetaminophen, bisacodyl, dextrose 10 % in water (D10W), dextrose, fentaNYL PF, glucagon, heparin, heparin flush, iohexol, lidocaine, lidocaine PF, midazolam, ondansetron ODT **OR** ondansetron, oxyCODONE-acetaminophen, oxyCODONE-acetaminophen, oxygen, vancomycin     Relevant Results  Results from last 7 days   Lab Units 01/22/24  0534 01/21/24  0607  01/20/24  0620   WBC AUTO x10*3/uL 6.2 6.8 6.4   HEMOGLOBIN g/dL 7.7* 7.7* 7.2*   HEMATOCRIT % 25.6* 24.6* 23.2*   PLATELETS AUTO x10*3/uL 194 178 172        Results from last 7 days   Lab Units 01/22/24  0534 01/21/24  0607 01/20/24  0620 01/19/24  0536 01/18/24  0503   SODIUM mmol/L 132* 131* 132*   < > 136   POTASSIUM mmol/L 4.1 3.5 4.2   < > 3.6   CHLORIDE mmol/L 94* 93* 91*   < > 94*   CO2 mmol/L 29 26 21   < > 24   BUN mg/dL 32* 29* 58*   < > 46*   CREATININE mg/dL 6.81* 5.89* 9.63*   < > 6.97*   CALCIUM mg/dL 8.9 8.6 7.7*   < > 9.0   PROTEIN TOTAL g/dL  --   --   --   --  6.4   BILIRUBIN TOTAL mg/dL  --   --   --   --  0.5   ALK PHOS U/L  --   --   --   --  133*   ALT U/L  --   --   --   --  9*   AST U/L  --   --   --   --  12   GLUCOSE mg/dL 247* 455* 516*   < > 131*    < > = values in this interval not displayed.        Transesophageal Echo (POLI)   Final Result      IR CVC nontunneled   Final Result   1. 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   2. Ultrasound and fluoroscopically guided left groin temporary   hemodialysis catheter, ready for use.        PLAN:   Consider conversion of the left groin temporary access to semi   permanent tunneled venous access when clinically appropriate, versus   replacement of a tunneled hemodialysis catheter in the right groin        MACRO:   None        Signed by: Ronaldo Dempsey 1/19/2024 3:36 PM   Dictation workstation:   [id number]      NM hepatobiliary   Final Result   1. Limited examination secondary to technical difficulties with a   single static image approximately 90 minutes post-injection with   radiotracer uptake within the liver, gallbladder, and small bowel,   findings exclude acute cholecystitis.        I personally reviewed the images/study, and I agree with the findings   as stated above. This study was interpreted at [facility], [place], [place].        MACRO:   None        Signed by:  Nessa Ayala 1/18/2024 9:53 AM   Dictation workstation:   [id number]      NM injection no scan   Final Result      XR abdomen 1 view   Final Result   1. A nonspecific, nonobstructive bowel gas pattern.        MACRO:   None        Signed by: Dean Stanton 1/17/2024 11:29 AM   Dictation workstation:   [id number]      Transthoracic Echo (TTE) Complete   Final Result      US gallbladder   Final Result   Prominent gallbladder wall thickening measuring 10 mm in thickness   and pericholecystic edema. There is however no definite evidence of   gallstones and per the sonographer, sonographic Muñiz sign is   absent. The gallbladder wall thickening is therefore nonspecific and   may relate to underlying liver disease, hypoproteinemia or cardiac   disease. Clinical correlation is recommended and if there is concern   for acalculous cholecystitis, HIDA scan may be obtained for further   evaluation.        MACRO:   None        Signed by: Houston Conde 1/14/2024 7:32 PM   Dictation workstation:   [id number]      CT chest abdomen pelvis wo IV contrast   Final Result   1.   Air in vasculature in the chest, abdomen and pelvis as described   above which may relate to vascular access, however clinical   correlation is recommended if there is concern for other etiologies.        2. Moderate size partially loculated left pleural effusion and small   right pleural effusion and bilateral atelectatic/consolidative   opacities. New 2 cm x 0.9 cm subpleural opacity right lower lobe with   mild calcifications.        3.  Right femoral central venous catheter which extends into the   chest and terminates in the SVC.        4.  Lymphadenopathy in the imaged lower neck and mediastinal   lymphadenopathy. Multiple prominent retroperitoneal lymph nodes are   also noted and enlarged inguinal lymph nodes. The lymph nodes may be   reactive in nature, however clinical correlation is recommended there   is concern for malignancy or other etiology.         5.   Gallbladder wall thickening and pericholecystic edema is   nonspecific and may relate to underlying liver disease,   hypoproteinemia or cardiac disease, however if there is concern for   acute cholecystitis/acute gallbladder pathology, right upper quadrant   ultrasound is recommended for further evaluation.        6.   Low attenuation tubular opacity in the pancreatic tail measuring   5 mm in diameter may correspond to dilated pancreatic duct and may be   better assessed on follow-up contrast enhanced imaging.        7.  Bilateral renal atrophy and multiple bilateral renal cysts and   subcentimeter hypodensities too small to characterize. Limited   evaluation for renal mass on noncontrast examination.        8.  Extensive atherosclerotic calcification in the chest, abdomen and   pelvis.        9.  Underdistention versus urinary bladder wall thickening; please   correlate urinalysis to evaluate for cystitis.        MACRO:   None        Signed by: Houston Conde 1/14/2024 6:05 PM   Dictation workstation:   [id number]      XR chest 2 views   Final Result   1. Small left pleural effusion with associated atelectasis and/or   pneumonitis.   2. Right basilar atelectasis and/or scarring.        MACRO:   None        Signed by: Jarvis Holcomb 1/14/2024 2:51 PM   Dictation workstation:   [id number]      IR CVC tunneled    (Results Pending)         Assessment/Plan     Edwar Hemphill is a 38 y.o. male with a past medical history of end-stage renal disease on hemodialysis via right femoral TDC who resides at [facility]. 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  He later had stump debridement  on 7/27 with wound VAC placement.  He had polymicrobial infection including resistant E coli, Acinetobacter baumannii and Enterobacter fecalis. He was admitted in September with positive blood cultures growing Acinetobacter baumannii and Stenotrophomonas. He went to IR for dialysis line exchange.  He then was admitted again in November where CT noted left sided fluid collection concerning for abscess.  He grew MRSA and Streptococcus, catheter was exchanged on November 13, he completed vancomycin at [facility].      He comes in now with fever, chills, congestion.  Attempt was made at triple-lumen catheter placement in the neck, could not pass a wire.  Was seen by Dr. Bulolck, on norepinephrine, vancomycin and Zosyn.    Mr. Hemphill required dialysis line removal.  Repeat blood cultures as of 1/18 are without growth.  He was cleared for PermCath and will go for placement today.  ID recommends IV Vancomycin 750mg with HD through 2/14/24. TTE limited but no endocarditis.  He was last dialyzed on Saturday.  He is planned for discharge.  I will update his dialysis unit with antimicrobial recommendation.        Principal Problem:    Septic shock (CMS/[facility])    I spent 35 minutes in the professional and overall care of this patient.      Eber Bruce, DO

## 2024-01-22 NOTE — POST-PROCEDURE NOTE
Interventional Radiology Brief Postprocedure Note    Attending: Ke    Assistant: None    Diagnosis: ESRD    Description of procedure: New R groin tunneled HD catheter, removal of L groin temp HD catheter     Anesthesia:  Local  2 mg Versed, 100 mcg Fentanyl    Complications: None    Estimated Blood Loss:  20 cc's        No specimens collected      See detailed result report with images in PACS.    The patient tolerated the procedure well without incident or complication and is in stable condition.

## 2024-01-23 NOTE — NURSING NOTE
This Nurse attempted to call report to [facility]. No answer to number provided by social work. Will pass along to next nurse to call before Pickup.    Physicians handling PU now scheduled 2130

## 2024-01-23 NOTE — DISCHARGE SUMMARY
Discharge Diagnosis  Septic shock (CMS/[facility])    Issues Requiring Follow-Up  none    Discharge Meds     Your medication list        START taking these medications        Instructions Last Dose Given Next Dose Due   epoetin tyson-epbx 10,000 unit/mL injection  Commonly known as: Retacrit      Inject 1 mL (10,000 Units) under the skin 3 times a week.              CHANGE how you take these medications        Instructions Last Dose Given Next Dose Due   insulin glargine 100 unit/mL injection  Commonly known as: Lantus  What changed: how much to take      Inject 12 Units under the skin once daily at bedtime. Take as directed per insulin instructions.       insulin lispro 100 unit/mL injection  Commonly known as: HumaLOG  What changed:   how much to take  additional instructions      Inject 0-0.15 mL (0-15 Units) under the skin 3 times a day with meals. Take as directed per insulin instructions.       pantoprazole 40 mg EC tablet  Commonly known as: ProtoNix  What changed: Another medication with the same name was added. Make sure you understand how and when to take each.      Take 1 tablet (40 mg) by mouth once daily in the morning. Take before meals. Do not crush, chew, or split. Do not start before November 17, 2023.       pantoprazole 40 mg EC tablet  Commonly known as: ProtoNix  What changed: You were already taking a medication with the same name, and this prescription was added. Make sure you understand how and when to take each.      Take 1 tablet (40 mg) by mouth once daily in the morning. Take before meals. Do not crush, chew, or split.              CONTINUE taking these medications        Instructions Last Dose Given Next Dose Due   acetaminophen 325 mg tablet  Commonly known as: Tylenol      Take 2 tablets (650 mg) by mouth every 4 hours if needed for moderate pain (4 - 6).       B complex-vitamin C-folic acid 1 mg capsule  Commonly known as: Nephrocaps           levothyroxine 125 mcg tablet  Commonly known as:  Synthroid, Levoxyl           loperamide 2 mg tablet  Commonly known as: Imodium A-D           melatonin 3 mg tablet      Take 1 tablet (3 mg) by mouth as needed at bedtime for sleep for up to 15 doses.       oxyCODONE-acetaminophen 5-325 mg tablet  Commonly known as: Percocet      Take 1 tablet by mouth every 6 hours if needed for severe pain (7 - 10).       sevelamer carbonate 800 mg tablet  Commonly known as: Renvela           simethicone 80 mg chewable tablet  Commonly known as: Mylicon                  STOP taking these medications      Dulcolax (bisacodyl) 10 mg suppository  Generic drug: bisacodyl                  Where to Get Your Medications        These medications were sent to [facility] [place], [place] - [address] [facility]  [id number] [facility] [id number]      Phone: [phone]   insulin glargine 100 unit/mL injection  insulin lispro 100 unit/mL injection       Information about where to get these medications is not yet available    Ask your nurse or doctor about these medications  epoetin tyson-epbx 10,000 unit/mL injection  pantoprazole 40 mg EC tablet         Test Results Pending At Discharge  Pending Labs       No current pending labs.            Hospital Course   Seen id , endocrine and renal    Dialysis cath was remeoved 2/2 bacteremia     New tunneled cath placed once bc were sterile       Pertinent Physical Exam At Time of Discharge  Physical Exam    Outpatient Follow-Up  No future appointments.  t35    Mauricio Estrada MD

## 2024-01-23 NOTE — NURSING NOTE
[facility] come and took the pt by stretcher to [facility]  All his belongings transferred with him and his rectal tube was dc . The nursing assistant gave him a good bathe and lotioned his whole body. Pt was given his hs medications except he refused his heparin subcutaneous. The ambulance personal was given report and all his transfer dc paperwork. Several attempts were made to call the nursing home and the phone number was rechecked. No one answered to give a report to them.

## 2024-01-29 LAB
ATRIAL RATE: 104 BPM
P AXIS: 62 DEGREES
P OFFSET: 207 MS
P ONSET: 146 MS
PR INTERVAL: 152 MS
Q ONSET: 222 MS
QRS COUNT: 17 BEATS
QRS DURATION: 100 MS
QT INTERVAL: 360 MS
QTC CALCULATION(BAZETT): 473 MS
QTC FREDERICIA: 432 MS
R AXIS: -49 DEGREES
T AXIS: 108 DEGREES
T OFFSET: 402 MS
VENTRICULAR RATE: 104 BPM

## 2024-02-02 ENCOUNTER — LAB REQUISITION (OUTPATIENT)
Dept: LAB | Facility: HOSPITAL | Age: 39
End: 2024-02-02
Payer: COMMERCIAL

## 2024-02-02 DIAGNOSIS — Z79.899 OTHER LONG TERM (CURRENT) DRUG THERAPY: ICD-10-CM

## 2024-02-02 LAB
ALBUMIN SERPL BCP-MCNC: 2.6 G/DL (ref 3.4–5)
ANION GAP SERPL CALC-SCNC: 18 MMOL/L (ref 10–20)
BUN SERPL-MCNC: 58 MG/DL (ref 6–23)
CALCIUM SERPL-MCNC: 8.8 MG/DL (ref 8.6–10.3)
CHLORIDE SERPL-SCNC: 97 MMOL/L (ref 98–107)
CO2 SERPL-SCNC: 24 MMOL/L (ref 21–32)
CREAT SERPL-MCNC: 10.95 MG/DL (ref 0.5–1.3)
EGFRCR SERPLBLD CKD-EPI 2021: 6 ML/MIN/1.73M*2
GLUCOSE SERPL-MCNC: 140 MG/DL (ref 74–99)
HCT VFR BLD AUTO: 24.5 % (ref 41–52)
HGB BLD-MCNC: 7 G/DL (ref 13.5–17.5)
PHOSPHATE SERPL-MCNC: 6.7 MG/DL (ref 2.5–4.9)
POTASSIUM SERPL-SCNC: 5.9 MMOL/L (ref 3.5–5.3)
SODIUM SERPL-SCNC: 133 MMOL/L (ref 136–145)

## 2024-02-02 PROCEDURE — 80069 RENAL FUNCTION PANEL: CPT

## 2024-02-02 PROCEDURE — 85018 HEMOGLOBIN: CPT

## 2024-02-02 PROCEDURE — 85014 HEMATOCRIT: CPT

## 2024-02-06 ENCOUNTER — LAB REQUISITION (OUTPATIENT)
Dept: LAB | Facility: HOSPITAL | Age: 39
End: 2024-02-06
Payer: COMMERCIAL

## 2024-02-06 DIAGNOSIS — R53.1 WEAKNESS: ICD-10-CM

## 2024-02-06 LAB
HCT VFR BLD AUTO: 20.9 % (ref 41–52)
HGB BLD-MCNC: 6.3 G/DL (ref 13.5–17.5)

## 2024-02-06 PROCEDURE — 85018 HEMOGLOBIN: CPT

## 2024-02-06 PROCEDURE — 85014 HEMATOCRIT: CPT

## 2024-03-04 ENCOUNTER — TELEPHONE (OUTPATIENT)
Dept: TRANSPLANT | Facility: HOSPITAL | Age: 39
End: 2024-03-04
Payer: COMMERCIAL

## 2024-03-04 ENCOUNTER — DOCUMENTATION (OUTPATIENT)
Dept: TRANSPLANT | Facility: HOSPITAL | Age: 39
End: 2024-03-04
Payer: COMMERCIAL

## 2024-03-04 DIAGNOSIS — N18.6 ESRD (END STAGE RENAL DISEASE) (MULTI): Primary | ICD-10-CM

## 2024-03-04 NOTE — PROGRESS NOTES
Do you have difficulty reading or writing in English?   no   What is the primary cause of your kidney disease?  Unknown  Are you currently on dialysis?   yes  If yes, what days do you have your dialysis treatments?  M, W, F  Have you received a transplant before?   no  If yes, what organ, and when and where was your transplant?     Have you been diagnosed with diabetes?    yes  Have you tested positive for hepatitis or HIV?   no  Have you ever been diagnosed with cancer?   no  If yes, what type of cancer, and when and where were you treated?    Do you have a history of a heart attack or stroke? No  Are you currently or have you previously been seen by a mental health professional?   no  If yes, what is the name of your mental health provider?     Are you a current or former tobacco user?   yes, current  Do you have history of alcohol abuse or dependence?   no, Quit Yrs Ago  Do you have a history of illegal drug abuse or dependence?   no  Has anyone told you they're willing to donate their kidney to you?   yes  Comments:  Intake complete, scheduled eval appt.  Pt. Has (R) Arm, (L) Leg Amputee.   Pt. At [facility].

## 2024-04-10 ENCOUNTER — DOCUMENTATION (OUTPATIENT)
Dept: TRANSPLANT | Facility: HOSPITAL | Age: 39
End: 2024-04-10
Payer: COMMERCIAL

## 2024-04-10 ENCOUNTER — HOSPITAL ENCOUNTER (INPATIENT)
Age: 39
End: 2024-04-10
Payer: COMMERCIAL

## 2024-04-10 DIAGNOSIS — N17.9 AKI (ACUTE KIDNEY INJURY): Primary | ICD-10-CM

## 2024-04-10 NOTE — PROGRESS NOTES
[facility]  Dental will be needed for listing approval.

## 2024-04-16 ENCOUNTER — OFFICE VISIT (OUTPATIENT)
Dept: TRANSPLANT | Facility: HOSPITAL | Age: 39
End: 2024-04-16
Payer: COMMERCIAL

## 2024-04-16 ENCOUNTER — TELEPHONE (OUTPATIENT)
Dept: TRANSPLANT | Facility: HOSPITAL | Age: 39
End: 2024-04-16

## 2024-04-16 ENCOUNTER — LAB (OUTPATIENT)
Dept: LAB | Facility: LAB | Age: 39
End: 2024-04-16
Payer: COMMERCIAL

## 2024-04-16 ENCOUNTER — SOCIAL WORK (OUTPATIENT)
Dept: TRANSPLANT | Facility: HOSPITAL | Age: 39
End: 2024-04-16
Payer: COMMERCIAL

## 2024-04-16 ENCOUNTER — EDUCATION (OUTPATIENT)
Dept: TRANSPLANT | Facility: HOSPITAL | Age: 39
End: 2024-04-16
Payer: COMMERCIAL

## 2024-04-16 VITALS
TEMPERATURE: 97.8 F | WEIGHT: 155 LBS | OXYGEN SATURATION: 98 % | DIASTOLIC BLOOD PRESSURE: 84 MMHG | BODY MASS INDEX: 22.96 KG/M2 | HEIGHT: 69 IN | HEART RATE: 104 BPM | SYSTOLIC BLOOD PRESSURE: 134 MMHG

## 2024-04-16 DIAGNOSIS — Z13.6 ENCOUNTER FOR SCREENING FOR CARDIOVASCULAR DISORDERS: ICD-10-CM

## 2024-04-16 DIAGNOSIS — Z01.818 PRE-TRANSPLANT EVALUATION FOR KIDNEY TRANSPLANT: Primary | ICD-10-CM

## 2024-04-16 DIAGNOSIS — N18.6 END STAGE RENAL DISEASE (MULTI): ICD-10-CM

## 2024-04-16 DIAGNOSIS — Z51.81 ENCOUNTER FOR THERAPEUTIC DRUG LEVEL MONITORING: ICD-10-CM

## 2024-04-16 DIAGNOSIS — F17.200 TOBACCO DEPENDENCY: Primary | ICD-10-CM

## 2024-04-16 PROCEDURE — 3079F DIAST BP 80-89 MM HG: CPT | Performed by: SURGERY

## 2024-04-16 PROCEDURE — 3008F BODY MASS INDEX DOCD: CPT | Performed by: HOSPITALIST

## 2024-04-16 PROCEDURE — 3075F SYST BP GE 130 - 139MM HG: CPT | Performed by: SURGERY

## 2024-04-16 PROCEDURE — 99215 OFFICE O/P EST HI 40 MIN: CPT | Performed by: SURGERY

## 2024-04-16 PROCEDURE — 99215 OFFICE O/P EST HI 40 MIN: CPT

## 2024-04-16 PROCEDURE — 3008F BODY MASS INDEX DOCD: CPT | Performed by: SURGERY

## 2024-04-16 PROCEDURE — 99205 OFFICE O/P NEW HI 60 MIN: CPT

## 2024-04-16 NOTE — PROGRESS NOTES
Transplant Nephrology Evaluation :    Edwar Hemphill is a 39 y.o.  came for Kidney transplant Evaluation .    History in detail : Mr. Hemphill is a 39-year-old male with a history of ESRD secondary to type 2 diabetes currently on dialysis since 2016.  -Diabetic all his life since he was a 11-year-old .  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  Patient is running out of access.  -Also have a right lower extremity DVT for which she was on Eliquis.  -Denied any coronary artery disease  or PCI.  -Does have mini strokes prior  -Did have a blood transfusion last year denied any history of stones.    Patient had a PD catheter prior but got infected and removed.  Currently on dialysis through right groin catheter.  No issues with hypertension or hypotension on dialysis.  Currently getting Monday Wednesday Friday dialysis.    Denied any psychiatric issues.  Last hospitalization in last summer when he had left leg amputation.    Past Medical History : History of diabetes ESRD secondary to diabetes, amputation of the left leg and right arm.  -Also had a history of prior strokes, DVT on Eliquis    Surgical History: As above    Family HX: History of hypertension in the family otherwise no other significant history    Social Connections: Not on file   Currently on disability lives currently at SNF planning to move out of SNF and looking for houses.  -He is a high school graduate and currently on disability.  Smokes cigars 1 pack every 3 days.  Denied any drug use or alcohol use  -Mentioned mother as well as girlfriend as a primary  "support        PROBLEM LIST:  Active Problems:  There are no active Hospital Problems.         ALLERGIES:  Allergies   Allergen Reactions    Cashew Nut Anaphylaxis and Swelling     cashews            CURRENT MEDICATIONS:  Scheduled medications    Continuous medications    PRN medications         OBJECTIVE:    VITALS: Visit Vitals  Smoking Status Every Day        General: No distress   Mucosa moist   AI, AC, AF     HEENT: PEERLA  CVS: S1 S2 no murmurs  RESP:  Lungs clear to auscultation   ABDO: Soft, non-tender   Neuro: A + O x 3  Skin: No rash   Extremities: Right upper arm amputated and left leg amputee, right groin catheter for dialysis access.    LABS:        No lab exists for component: \"SODIUM\", \"POTASSIUM\", \"CHLORIDE\", \"BICARBONATE\", \"MAGNESIUM\", \"PHOSPHOROUS\"       [unfilled]       ASSESSMENT AND PLAN:    Edwar Hemphill is a 39 y.o. male with a history of ESRD secondary to type 2 diabetes currently on dialysis since 2016.    Seems very marginal candidate for transplant  -Karnofsky score is around 50-wheelchair-bound with right arm amputation as well as left leg amputated currently living at SNF but planning independent housing  -Running out of access and only accesses in the right groin and also had DVT in the right lower extremity.  Need complete hypercoagulable workup.  -Need stable living condition and good support system to further proceed with the evaluation.  Needs psychosocial clearance  -Will get CAT scan abdomen pelvis to evaluate for the vessels and patency of the vessels for transplantation.  -Need RIMA TBI on the right lower extremity  -Need complete cardiac workup with cardiology clearance.  -Age-appropriate screening and protocol based blood work is needed.  -Will discuss in the committee about his candidacy      I had a discussion with this patient regarding 1 year graft and patient survival statistics following renal transplantation for both living and  donor allograft " recipients. This data included [facility] data compared to National data readily available for review on https://www.SRTR.org. The patient also had attended the kidney transplant education class provided by the transplant institute.    Further discussion included:  -The transplant selection committee process.  -The need for lifelong immunosuppressive therapy, and the side effects of these medications including the risk of infections, cancer, and lymphoma.  -The wait list time approximately is 5 years or more for  donor transplants and the statistical superiority of a living donor.  -The patient was re-educated regarding the responsibilities of being listed on the transplant waitlist.  - Patient encouraged to avoid blood transfusion unless it's deemed a medically necessary.  -Educated patient on the current allocation policy per [facility] regarding kidney and/or kidney-pancreas/pancreas transplant.  - Education covered the need for monthly serum samples to be drawn and sent to the [facility] while actively listed.  - The patient was told to inform the Pre Transplant office if there are any changes in their medical condition, demographics, insurance coverage ( including medication coverage) and or dialysis units.  - The patient was reminded to fax test results of studies that were obtained outside [facility] facility.  - Using identified donors with risk criteria for transmission of infection  -Potential transmission of infectious disease from any  donors, as well as living donors.   -The possibility of transmission of tumors and infections via the transplanted organ.  -The inability to completely test for all potential harmful tumors or infectious agents.  -The possibility of listing at multiple locations.    Surgical complications including need for reoperation(s) including but not limited to:  -Bleeding.  -Repair of leaks.  -Control of infection.  -Possible kidney transplant removal.    The  medical complications including but not limited to:  -Death.  -Cardiac.  -Pulmonary.  -Infectious.  -Neurologic.  -Other Complications.    We also discussed how the kidney transplant could function:  -Non-function and possible kidney transplant removal within the first 3 to 6 months.  -Delayed graft function (dialysis needed after transplant).  -The potential of recurrence of kidney disease leading to kidney transplant graft loss.    Thank you for consulting :  Celestino Nguyen MD      Notes created by Zoe -Please excuse the Typos .

## 2024-04-16 NOTE — PROGRESS NOTES
Patient attended class on 04/16/24, ambulated via electric scooter.  Accompanied to class with friend who was ambulating independently with a rollator.  Oral and written education was provided.  Patient remained attentive throughout the review session, asking appropriate questions.  Evaluation consents were signed. Pt was able to take himself to lab via his electric wheelchair.     PRE-TRANSPLANT EDUCATION  Patient received education regarding the following topics as part of their pre-transplant evaluation:  The evaluation process, including:  Transplant team members and roles    Required consultations and testing  Selection criteria and suitability for transplant  Listing process and receiving an organ offer  Psychosocial and financial considerations for a successful transplant  Patient responsibilities, including the necessity of adhering to a strict medical regimen  An overview of the surgical procedure   Potential medical, surgical, and psychosocial risks to transplantation, including:  Wound infection  Pneumonia  Blood clot formation  Organ rejection, failure, and possibility of re-transplantation  Lifetime immunosuppression therapy and associated risks  Arrhythmias and cardiovascular collapse  Multi-organ system failure  Death  Depression  Post-Traumatic Stress Disorder  Generalized anxiety, issues of dependence, and feelings of guilt  Available alternatives to transplantation  Donor risk factors that could affect the success of the transplant and the health of the patient, including:  Donor age  Donor medical and social history  Condition of the organ  Risk of sukh cancer, HIV, Hepatitis B, Hepatitis C, or malaria if the infection is not detectable at the time of donation  Patient?s right to withdraw consent for transplantation at any time during the process  Transplants not performed in a Medicare-approved transplant center could affect the patient?s ability to have immunosuppression medication paid  for under Medicare part B.   Multiple listing options.    Patient was given the opportunity to have questions answered. Patient was provided a copy of the informed consent for transplant evaluation.    Signed evaluation informed consent received? Yes

## 2024-04-16 NOTE — PROGRESS NOTES
"ENCOUNTER    Visit Type Initial Visit  Location: [place] [address]     Barriers to Communication / Understanding:   [] Language [] Vision [] Hearing [] Other      []  Present     Accompanied By: MomShanthi     Organ For Transplant:  Kidney    Ethnicity:  Black Or     ADLs Dependent: Pt reported he currently lives in a nursing home and he is missing an arm and a leg and needs assistance with bathing and cleaning himself.     Instrumental ADLs Fully Independent      Level of Activity Very Active      DME: Pt reported he uses a wheelchair constantly.     Knowledge of Health Good  Diabetes and has had \"many strokes in the past.\" Pt reported his last stroke was over 5 years ago.     Why Do You Have End Stage Organ Disease   Pt reported, \"Still to this day I don't know. I haven't asked any questions.\"     Knowledge of Transplant / VAD:  Yes Patient Is Able To Make An Informed Decision    Patient Understands the Risks of Transplant / VAD  Yes Rejection  Yes Infection Yes Complications  Yes Death    Patient Understands Recovery and Follow-Up from Transplant / VAD  Yes Length Of State Yes Appointments  Yes Labs  Yes Rehabilitation    Patient Has Identified Goals of Transplant / VAD Yes  Pt reported, \"I just want to live. I want to see things I haven't seen and visit places I haven't been.\"     Any Potential Donors?     Overall Compliance  good     Pt reported taking 3 medications daily.   Compliance With Medications good    Managed By Other Pt reported he is not managing his current medications due to the nursing home staff administering them. Pt reported prior to living in the nursing home, he had all of his medications delivered to him through [facility] which helped him stay organized.       Understanding Of Medication  good  Compliance With Appointments good    How Does Patient Handle Health Problems      Organ  Kidney    IOP:     Fluid Restriction:   Yes [x] Compliant   Pt reported when " "he goes to dialysis, they only take off a small amount of fluid. Pt was unable to recall the amount for his fluid restricition.     Medical And Clinic Appointment Compliant Yes    Dialysis:  [x] What Dialysis Center [facility]  [x] Began 2016      [x] In Center [] Home Hemo [] Peritoneal       Attendance:  Treatment Attendance Fair Treatment Time: MWF and runs for 3.5 hours.     [x] Shortened Treatments [] Rescheduled Treatments [x] Missed Treatments   Pt reported he has shortened and missed dialysis treatments before. Pt reported he shortened a \"couple times\" due to cramping and his blood pressure dropping. Pt reported in the past, he would shorten treatments by 30 minutes to an hour. Pt reported he last shortened \"sometime last year.\" Pt reported due to a transportation issue, he has missed an appointment recently. Pt reported he uses provider ride and they wouldn't re-schedule since he missed 2 transportation appointments due to being in the hospital. Pt shared he has shown up late to dialysis due to using provider ride and this not being dependable.     Fluids:     Does Patient Still Urinate  [] Fluid Restriction [] IDWG [] EDW  Achieved Dry Weight        Diet:  Yes Patient is compliant with renal restrictions    Yes Patient is compliant with low sodium diet    Pt reported since he lives in a nursing home, \"everyone eats the same thing and there are no specific diets for people in the nursing home.\" Pt reported he is sometimes served fruits and veggies, but \"most of the time, it's not.\"    Labs:    [] Patient Reported [] Collateral       []  Potassium [] Albumin [] Phosphorus      # of Binders:  [x] # of Binders per meal 3 [x] Meals per day 3      []  # of Binders per Snack [] Snacks per day [] Phosphorus     Pancreas:  [] Checks blood sugar      times/day [] A1c   Hypoglycemic Episodes  Unawareness  Outside Interventions    Liver:  Is Lactulose prescribed  Dose:   Timesper day:  Is patient compliant     SOCIAL " HISTORY  No       Education:  education: Other Construction certificate        Literate Yes   Computer literate Yes    Internet access Yes       Sources of Income: Pt reported he began receiving disability in 2016. Pt reported he receives $988.00/monthly in disability. Pt denied any other current sources of income.   Patient's Current Employment    [] Full-Time [] Part-Time [] Unemployed [] Retired     []  None [] Not working by choice [x] Not working disabled     [] Short Term Leave   [] Other   Employment History     Will patient have paid status from employment during recovery       Spouse / SO Current Employment     Will spouse / SO have paid status from employment during recovery       Other Sources of Income     Does patient have financial concerns No     Is patient able to meet current monthly expenses Yes    Resources:      Patient was provided information on transplant fundraising       Insurance:  Primary Insurance Medicare Wellcare    Secondary Insurance     Prescription Coverage Copay cost per month $0      Medicare coverage due to     Medicare Part A  Effective date    Medicare Part B  Effective date    Medicare Part C  Deductable  Out of Pocket Max    Medicare Part D Yes Extra Help?    Medicaid  Waiver  Redetermination Date    HMO       FAMILY SYSTEM    Single Yes    How long   Describe Relationship    How long   Describe Relationship    When    When  In a Relationship   How Long  Describe Relationship    Spouse / SO Name   Age   Health   Other Caregiver Responsibilities  Spouse / Significant others reaction to donation    Children:  No # Biological   # Adopted    # Step Children      Parents:  Raised By One Biological Parent Mom     Did the patient have contact with the other parent No    Mother  No Age   Cause of Death   Father  Yes Age 44   Cause of Death Diabetic     Living Parent #1 Shanthi, lives local, contact daily     Additional  Information    Siblings:  [x] # Biological 1 sister  [] # Half Siblings [] # Step Siblings     Sibling #1 Mary, lives local, contact daily     Support & Recovery Plan:  Yes Adequate    Primary Support:  Anahi Maki  Phone [phone]  Age 63  Relationship to Patient Mom   If employed, can they take time off work Retired   If so, is it paid time off    If not, will this impact your finances No   Did they attend education classes Yes   Do they have other caregiver responsibilities (child or eldercare) No   Do they have their own conditions which may prevent them from providing care for you No  (Medical, psychological, physical limitations)    Are they available on short notice Yes   Are they reliable Yes   Are they responsible Yes   Are they able to understand and process new information Yes   Do they have reliable transportation or will you allow them to use your vehicle Yes- Shanthi reported she does not currently have a vehicle, but she could borrow Pt's vehicle if needed. Shanthi shared she uses para transit as well.   Are they currently involved in your care Yes   Comments    Secondary Support:  Name Terra Victoria  Phone [phone]  Age 39  Relationship to Patient Friend   If employed, can they take time off work Yes Works at  as an LPN, full-time  If so, is it paid time off Yes   If not, will this impact your finances No   Did they attend education classes No   Do they have other caregiver responsibilities (child or eldercare) No   Do they have their own conditions which may prevent them from providing care for you No  (Medical, psychological, physical limitations)    Are they available on short notice Yes   Are they reliable Yes   Are they responsible Yes   Are they able to understand and process new information Yes   Do they have reliable transportation or will you allow them to use your vehicle Yes    Are they currently involved in your care Yes   Comments    Alternate Support  Alternate  "Support    Housing:  Yes Adequate Permanent  Type of Home Nursing home- [facility] off of [address]   Distance to [facility] 15 minutes    Pets   Does Patient Feel Safe in Home Yes  Pt reported he has lived in the nursing home for 2.5 years.     Transportation:  No Adequate  # Licensed Drivers in the Home 1   Does Patient Drive No If not, why Pt is not currently driving, he has had his left leg and right arm amputated   # Reliable Vehicles 1  Does Patient use Public Transportation Yes  Does Patient use Medical Transportation Yes  Comments    MENTAL HEALTH  Cognition:  No impairment observed / reported    The patient reports their mood as \"Off and on. Recently I've been in good moods and I've felt uplifting and positive.\"      Reported Mental Health Diagnosis: Denied  Family History of Mental Health Concerns: Denied, \"aunt and dad possibly\"  What are patient psychosocial stressors Pt stated, \"You can imagine being in the nursing home. There is so much you have to deal with. I do anything positive I can. I keep my mckenzie in God.\"      Current Medications:  No  Mental Health Meds  Denied  Rx'd by   Sleep Meds  Melatonin OTC  Rx'd by   Pain Meds  Percocet, but reported he doesn't take it Rx'd by Nursing Home     OTC Meds: Denied  Past Medications: Denied    Counseling Never  Denied. Declined resources.     Has patient ever been hospitalized for mental health reasons No   Was the hospitalization voluntary  Duration   Where    When  Describe situation    Discharge Plan for Follow Up  Was Discharge Plan Completed   Referral to Transplant Psych No  Mental Health Follow Up Required      Suicide Assessment:  History of Suicide Ideation No  [] Timeframe [] Frequency   Frequency   Plan Created  Intent to Follow Through  Outcome      History of Suicide Attempt No     History of Suicidal Ideation in the past 3 months No  Intensity   Duration     Description of Plan      Plans thought of  Intent to Follow Through  Highest Level of " "Intent to Follow Through    Current Plan for Safety    Plan for Follow-Up    Patient's Reported Trauma History: Pt stated, \"years ago.\"      What are patient's coping behaviors: Pt reported he enjoys cooking, playing video games, making music, and hanging out with his family.     Hoahaoism / Spirituality: Pt stated, \"I'm not Hoahaoism, I just have a personal relationship with God. I believe in a higher power and God talks to me.\"     Attitude toward interviewer Cooperative and Appropriate    Eye Contact Patient maintained good eye contact throughout appointment    Appearance The patient was neatly groomed, appropriately dressed and adequately nourished    Affect Appropriate    Thought Process Appropriate    Substance Use /Abuse History:    Current Tobacco User Yes  Patient uses Cigarettes  Tobacco Frequency Daily   For How Long 1 pack every 3 days   Is Patient Required to Quit     Former Tobacco User Yes  Describe past tobacco use and date quit  Pt reported he used to smoke 1 PPD and has been smoking for 28 years. Pt reported he is working on quitting and shared he had no urges while hospitalized. SW discussed abstinence is preferred and Pt desires smoking cessation referral.    Current Alcohol User No  Type of Alcohol Used   Amount  Frequency   Pattern of Alcohol Use     Is Patient Required to Quit   Continued to use the substance despite being told the substance is affecting their health    History of problems at work, school or home due to substance use      Former Alcohol User Yes  Describe past alcohol use and date quit  Pt reported he last drank alcohol over 10 years ago. Pt reported he drank beer, liquor, and wine in the past. Pt reported he had alcohol poisoning twice. Pt reported he drank daily, having a 6 pack by himself and a small pint of liquor, not including what his friends brought as well. Pt reported he drank daily for \"years\" and shared this was his was heaviest use. Pt reported he was young and " "\"dealing with so called friends and ups and downs.\" Pt reported he would cut school to drink and would drink during school. Pt stated, \"I wasn't an alcoholic, I was a drunk.\" Pt shared as he became older, he was more concerned about his alcohol use. Pt denied any past DUI's.     Has patient ever gone to CD treatment No  If yes, When, Where and What type of Program  Attends AA meetings Few  No Sponsor  Do support people drink alcohol   If yes, describe support people's use  Is alcohol kept in the home No  Does Patient need to sign a CD contract     Current Illegal / Unprescribed Drug User No  Type of Illegal Drug Used   Frequency  Pattern of Drug Use    Is Patient Required to Quit     Illegal / Unprescribed Drug #2  Type of Illegal Drug Used   Frequency  Pattern of Drug Use      Continue to use the substance despite being told the substance is affecting their health    History of problems at work, school or home due to substance use      Former Illegal / Unprescribed Drug User Yes  Describe past illegal drug use and date quit  Pt reported he used marijuana and embalming fluid in the past and he would dip his cigarettes and marijuana in the fluid. Pt reported he last smoked marijuana at 19 years old. Pt reported he smoked marijuana daily for 7 years. Pt reported he started smoking marijuana at 12. Pt reported he used blunts mainly, but this varied. Pt shared one time he smoked a \"whole ounce\" by himself. Pt reported he would have \"maybe 2-3 blunts in a sitting and his friends would join sometimes.\"     Has patient ever gone to CD treatment   If yes, When, Where and What type of Program   Attends AA/NA  meetings    Is patient on a Methadone / Suboxone regiment   Do support people use illegal drugs   If yes, describe support people's use  Are illegal drugs kept in the home   Does Patient need to sign a CD contract     Illegal / Unprescribed Drug #2  Type of Illegal Drug Used   Frequency  Pattern of Drug " "Use    Prescription Drug Abuse:  No Has patient experienced feelings of addiction  No Has patient experienced symptoms of withdrawal  No Has patient experienced any side effects? e.g.  hallucinations or delusions    Does Patient Meet the Criteria for Alcohol Use Disorder Yes Diagnosis Alcohol use disorder, moderate, in sustained remission   Does Patient Meet OSOTC guidelines Yes  Does Patient Meet the Criteria for Illegal Drug Use Disorder Yes Diagnosis  Does Patient Meet OSOTC guidelines Yes    OSOTC Substance Relapse Risk Factors   DSM-5 Severity Factors:     IOP     LEGAL ISSUES  Yes Arrests- Pt reported as a teenager he was arrested for stealing candy    Currently probation or parole    correction No  When   How long   Where       Citizenship:  Yes [place] Citizen   Green Card  Visa    Advance Directives: No   HCPOA Requested Copy  Requested Copy  SW provided documents.   Guardian:    IVETTE FRANCOIS met with Pt and Pt's mother, Shanthi in exam room for initial psychosocial assessment. Pt was pleasant and engaged. Pt reported his insurance as Well[employer] Medicare. Pt demonstrated an excellent understanding of the transplant process. Pt denied any current financial concerns. Pt reported he currently lives at [facility]. Pt presented to appointment in wheelchair and shared his left leg and right arm were amputated. Pt reported he is totally dependent with his ADL's and fully independent with his IADL's. When asked about the cause of his kidney disease, Pt stated \"Still to this day I don't know. I haven't asked any questions.\" Pt reported his goals of transplant as, \"I just want to live. I want to see things I haven't seen and visit places I haven't been.\" Pt reported compliance with his medications, though shared they are administered by nursing home staff. Pt shared that previously his medications were delivered to his home through [facility] and came to Pt pre-organized. Pt expressed that he " "tries to stick to a low sodium diet, but the nursing home does not provide specific diets for specific people. Pt reported he last shortened a dialysis treatment \"sometime last year.\" Pt shared that he has missed dialysis appointments and showed up late to dialysis appointments due to provider ride \"not being dependable.\" Pt listed his mom, Shanthi as primary support and his friend, Terra as secondary support. Shanthi shared she drives but does not have a vehicle. Pt reported he has a vehicle that Shanthi can use as needed. SW to call and confirm Pt's secondary support. Pt reported his mood as, \"Off and on. Recently I've been in good moods and I've felt uplifting and positive.\" Pt reported living in a nursing home is a current stressor for him. Pt denied any current/past MH diagnosis/concerns. Pt denied any current/past MH treatment. Pt reported the nursing home prescribes him Percocet for pain, but Pt does not take this. Pt scored a 0 on both the PHQ-9 and the JESSIKA-7, indicating no clinical depression or clinical anxiety. Pt reported he currently smokes 1 pack of cigarettes every 3 days. Pt reported he has been smoking cigarettes for 28 years and cut his use down from 1 PPD. Pt desires smoking cessation referral. Pt denied any current alcohol use and reported he last drank \"over 10 years ago.\" Pt reported he drank daily in the past, having a 6 pack by himself and a small pint of liquor. Pt reported this did not include what his friends brought him to drink as well. Pt reported he drank daily for \"years.\" Pt shared he attended a few AA meetings in the past. Pt meets criteria for alcohol use disorder, moderate, in sustained remission. Pt denied any current illicit drug use. Pt reported he last smoked marijuana at 19 years old. Pt reported he used marijuana and embalming fluid in the past and he would dip his cigarettes and marijuana in the fluid. Pt reported he smoked marijuana daily for 7 years and would have \"maybe 2-3 " "blunts\" in a sitting. Pt meets criteria for cannabis use disorder, moderate, in sustained remission. SW administered documents. Pt scored a 21 on the SIPAT, indicating Pt is a minimally acceptable candidate for transplant. SW would consider listing Pt while monitoring identified risk factors. Identified risk factors include Pt being dependent with his ADL's, Pt's limited understanding of the cause of his kidney disease, the nursing home staff administering Pt's current medications, Pt's fair compliance in the past with dialysis, Pt's secondary support being unconfirmed at time of assessment, Pt's active tobacco use, Pt's diagnosis of alcohol use disorder, moderate, in sustained remission, and Pt's diagnosis of cannabis use disorder, moderate, in sustained remission.     PLAN  SW to call and confirm Pt's secondary support. Pt would benefit from further education. SW to follow-up with Pt in 6 months to monitor Pt's tobacco use, compliance, and Pt's sobriety from alcohol and marijuana.   "

## 2024-04-17 ENCOUNTER — DOCUMENTATION (OUTPATIENT)
Dept: TRANSPLANT | Facility: HOSPITAL | Age: 39
End: 2024-04-17
Payer: COMMERCIAL

## 2024-04-17 DIAGNOSIS — E11.59 TYPE 2 DIABETES MELLITUS WITH OTHER CIRCULATORY COMPLICATIONS (MULTI): ICD-10-CM

## 2024-04-17 DIAGNOSIS — Z01.818 PRE-TRANSPLANT EVALUATION FOR KIDNEY TRANSPLANT: ICD-10-CM

## 2024-04-17 DIAGNOSIS — N18.9 CHRONIC KIDNEY DISEASE, UNSPECIFIED CKD STAGE: ICD-10-CM

## 2024-04-17 SDOH — ECONOMIC STABILITY: FOOD INSECURITY: WITHIN THE PAST 12 MONTHS, YOU WORRIED THAT YOUR FOOD WOULD RUN OUT BEFORE YOU GOT MONEY TO BUY MORE.: NEVER TRUE

## 2024-04-17 SDOH — ECONOMIC STABILITY: FOOD INSECURITY: WITHIN THE PAST 12 MONTHS, THE FOOD YOU BOUGHT JUST DIDN'T LAST AND YOU DIDN'T HAVE MONEY TO GET MORE.: NEVER TRUE

## 2024-04-17 ASSESSMENT — PATIENT HEALTH QUESTIONNAIRE - PHQ9
9. THOUGHTS THAT YOU WOULD BE BETTER OFF DEAD, OR OF HURTING YOURSELF: NOT AT ALL
7. TROUBLE CONCENTRATING ON THINGS, SUCH AS READING THE NEWSPAPER OR WATCHING TELEVISION: NOT AT ALL
1. LITTLE INTEREST OR PLEASURE IN DOING THINGS: NOT AT ALL
2. FEELING DOWN, DEPRESSED OR HOPELESS: NOT AT ALL
4. FEELING TIRED OR HAVING LITTLE ENERGY: NOT AT ALL
SUM OF ALL RESPONSES TO PHQ9 QUESTIONS 1 & 2: 0
SUM OF ALL RESPONSES TO PHQ QUESTIONS 1-9: 0
SUM OF ALL RESPONSES TO PHQ9 QUESTIONS 1 & 2: 0
8. MOVING OR SPEAKING SO SLOWLY THAT OTHER PEOPLE COULD HAVE NOTICED. OR THE OPPOSITE, BEING SO FIGETY OR RESTLESS THAT YOU HAVE BEEN MOVING AROUND A LOT MORE THAN USUAL: NOT AT ALL
3. TROUBLE FALLING OR STAYING ASLEEP OR SLEEPING TOO MUCH: NOT AT ALL
1. LITTLE INTEREST OR PLEASURE IN DOING THINGS: NOT AT ALL
5. POOR APPETITE OR OVEREATING: NOT AT ALL
6. FEELING BAD ABOUT YOURSELF - OR THAT YOU ARE A FAILURE OR HAVE LET YOURSELF OR YOUR FAMILY DOWN: NOT AT ALL
2. FEELING DOWN, DEPRESSED OR HOPELESS: NOT AT ALL

## 2024-04-17 ASSESSMENT — ANXIETY QUESTIONNAIRES
6. BECOMING EASILY ANNOYED OR IRRITABLE: NOT AT ALL
7. FEELING AFRAID AS IF SOMETHING AWFUL MIGHT HAPPEN: NOT AT ALL
4. TROUBLE RELAXING: NOT AT ALL
2. NOT BEING ABLE TO STOP OR CONTROL WORRYING: NOT AT ALL
3. WORRYING TOO MUCH ABOUT DIFFERENT THINGS: NOT AT ALL
1. FEELING NERVOUS, ANXIOUS, OR ON EDGE: NOT AT ALL
5. BEING SO RESTLESS THAT IT IS HARD TO SIT STILL: NOT AT ALL
GAD7 TOTAL SCORE: 0

## 2024-04-17 NOTE — PROGRESS NOTES
Chief Complaint: Patient presents for kidney transplant evaluation    History of Present Illness:  Edwar Hemphill  is a 39 y.o. male presents with ESRD from DM II and HTN. 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 Has been in SNF for past year; accompanied by mother who is also in wheelchair. Is hoping to move into independent housing outside of SNF. Unclear additional support and transportation assistance that he would be eligible for. Hx UE clots now HD via R thigh catheter    Hemodialysis:  Monday, Wednesday, Friday.    ROS: anuric, no urinary retention/hematuria/recurrent UTIs/nephrolithiasis.   Disease Etiology:  diabetic nephropathy and hypertensive nephropathy.   Disease Complications: dyslipidemia and hypertension   PVD:  Yes  Prior Malignancy: No     Past Medical History:  DM II  Hx TIA  Upper extremity DVTs/SVC syndrome  Hx R LE DVT    Past Surgical History:  L BKA, then AKA  RUE amp at shoulder    Social History:  Tobacco: 1/2 PPD  EtOH: none  Illicits: none    Review of Systems:  Cardiac: Denies chest pain, palpitations  : Anuric. Denies history of gross hematuria, nephrolithiasis, urinary retention, or recurrent UTIs.  Vascular: + DVT per HPI   Functional Status: Wheelchair bound; being fitted for prostheses    Transfusions: yes in 2023   Pregnancy:  Not applicable  Prior transplant: Not applicable    Physical Exam:  Gen: A+OX3; NAD  HEENT: PERRL, sclera anicteric, MMM  Cardiac: RRR  Chest: Normal inspiratory effort  Abdomen: S/NT/ND.  Ext: No LE edema  Vascular: 2+ palpable left femoral pulses  Psychiatric: Normal mood, affect    Assessment/Plan:  - The patient is a medically suitable candidate for kidney transplantation but complex psychosocial for support and transportation were he to move out of SNF  - Routine age/gender based screening  - Cardiac testing per protocol: ECHO/CT cardiac calcium  - Non-contrast CT scan  abdomen/pelvis    Transplant Education:    I had a discussion with this patient regarding 1 year graft and patient survival statistics following renal transplantation for both living and  donor allograft recipients. This data included [facility] data compared to National data readily available for review on https://www.SRTR.org. The patient also had attended the kidney transplant education class provided by the transplant institute.     The difference between allograft function was discussed comparing living donor, KDPI 0-85%, and >85% kidneys.     Further discussion included:  -The transplant selection committee process.  -The need for lifelong immunosuppressive therapy, and the side effects of these medications including the risk of infections, cancer, and lymphoma.  -The wait list time approximately is 5 years or more for  donor transplants and the statistical superiority of a living donor.     -Using identified donors with risk criteria for transmission of infection  -The possibility of utilizing  donors with known HCV antibody and/or DAJUAN positivity and post-transplant treatment/surveillance protocol  -Potential transmission of infectious disease from any  donors, as well as living donors.   -The possibility of transmission of tumors and infections via the transplanted organ.  -The inability to completely test for all potential harmful tumors or infectious agents.  -The possibility of listing at multiple locations.     Surgical complications including need for reoperation(s) including but not limited to:  -Bleeding.  -Repair of leaks.  -Control of infection.  -Blood clots in the transplant vessels.  -Possible kidney transplant removal.     The medical complications including but not limited to:  -Death.  -Cardiac.  -Pulmonary.  -Infectious.  -Neurologic.  -Other Complications.     We also discussed how the kidney transplant could function:  -Non-function and possible  kidney transplant removal within the first 3 to 6 months.  -Delayed graft function (dialysis needed after transplant).  -The potential of recurrence of kidney disease leading to kidney transplant graft loss.    Time Attestation:  I spent 60 minutes with the patient, over 50 minutes in counseling and education as outlined above.    Claudia Quezada MD, PHD, MPH, FACS  Chief of Transplant and Hepatobiliary Surgery

## 2024-04-17 NOTE — PROGRESS NOTES
Eval Clinic Note:  Patient attended evaluation appts on 04/16/24   with Dr. Lokesh Quezada and Dr. Tirado.  Medications and allergies reviewed with patient.  Patient presented to appointment with Mother-Shanthi who utilized a rollator for ambulation.  Patient used a motorized wheelchair, Hx of Right arm amputation and Left BKA.    History:  Patient has a history of HTN, DM2  Dialysis: on M-W-F, ACCESS: Right groin line, since 2016.  Testing completed recently: ECHO, chest x-ray  Testing needed for evaluation: hypercoagulable workup, SW follow up, RIMA/TBI, CT cardiac score, CT scan of A/P, EKG, virtual  visit, cardiology evaluation.   Listing Consent: Not completed  Comments:  Provided patient with my contact info for questions and concerns.     Plan will be to start with CT A/P, RIMA/TBI, Hypercoag labs. After those tests are completed and pending the results, will order further testing that is listed above.    LISTING EDUCATION    Patient educated regarding the following prior to placement on the transplant waiting list:   The patient?s medical condition, prognosis, and treatment plan.   The expectations and patient responsibilities while on the waiting list, including:   Keeping the transplant center informed of any changes in contact information or insurance coverage   Notifying the transplant center of any changes in medical status   Required testing and/or re-evaluation appointments while awaiting transplant   An overview of the surgical procedure, including potential risks and alternatives.   Information regarding what to expect during the inpatient admission and recovery period.   A discussion regarding organ offers and types of potential donors, including potential risks that may be associated with specific types of donors that could affect the success of the transplant or the health of the patient.  The right to refuse transplantation.     Patient was given the opportunity to have  questions answered. Patient was provided a copy of the informed consent for transplant listing.    Education provided by:  Transplant Coordinator: Eva Lea  Transplant Physician: Dr. Lokesh Quezada    Will move forward with listing consent pending results of CT A/P and review of previous Echo results.    Signed listing informed consent received? NO  Patient agrees to be listed for the following:  KDPI > 85% [ ]  Donors After Circulatory Death (DCD) [ ]  Donors with a Positive Core Antibody for Hepatitis B [ ]  Donors with Hepatitis C Virus to recipients with hepatitis C [ ]  Donors with Hepatitis C Virus to Negative hepatitis C recipients [ ]    Patient will be discussed at an upcoming selection committee to determine eligibility to be placed on the [facility] waiting list.

## 2024-04-18 ENCOUNTER — TELEPHONE (OUTPATIENT)
Dept: TRANSPLANT | Facility: HOSPITAL | Age: 39
End: 2024-04-18
Payer: COMMERCIAL

## 2024-04-23 ENCOUNTER — DOCUMENTATION (OUTPATIENT)
Dept: TRANSPLANT | Facility: HOSPITAL | Age: 39
End: 2024-04-23

## 2024-04-23 NOTE — PROGRESS NOTES
"PRISCILA spoke with Pt's secondary support, Terra via telephone call. Terra reported she works at [employer] as an LPN and she could take PTO to assist with taking Pt to his post-transplant appointments twice a week. Terra stated, \"I'm not sure how much time I could take off, but he lives in SNF and someone will be there with him 24/7.\" Terra shared she has no caregiver responsibilities, lives local to Pt, has no limitations, and she drives and has a working vehicle. Terra reported she is willing and committed to help Pt throughout transplant process. Terra denied any further questions/concerns at this time.     Plan: SW to follow original plan from 04/16/2024.  "

## 2024-04-30 ENCOUNTER — APPOINTMENT (OUTPATIENT)
Dept: RADIOLOGY | Facility: HOSPITAL | Age: 39
DRG: 314 | End: 2024-04-30
Payer: COMMERCIAL

## 2024-04-30 ENCOUNTER — APPOINTMENT (OUTPATIENT)
Dept: VASCULAR MEDICINE | Facility: HOSPITAL | Age: 39
DRG: 314 | End: 2024-04-30
Payer: COMMERCIAL

## 2024-04-30 ENCOUNTER — HOSPITAL ENCOUNTER (INPATIENT)
Facility: HOSPITAL | Age: 39
LOS: 26 days | Discharge: NURSING, REHAB OR LONG-TERM CARE | DRG: 314 | End: 2024-05-26
Attending: EMERGENCY MEDICINE | Admitting: SPECIALIST
Payer: COMMERCIAL

## 2024-04-30 DIAGNOSIS — R65.21 SEPTIC SHOCK (MULTI): ICD-10-CM

## 2024-04-30 DIAGNOSIS — E10.69 TYPE 1 DIABETES MELLITUS WITH OTHER SPECIFIED COMPLICATION (MULTI): ICD-10-CM

## 2024-04-30 DIAGNOSIS — B95.61 STAPHYLOCOCCUS AUREUS BACTEREMIA: ICD-10-CM

## 2024-04-30 DIAGNOSIS — R78.81 STAPHYLOCOCCUS AUREUS BACTEREMIA: ICD-10-CM

## 2024-04-30 DIAGNOSIS — A41.9 SEPTIC SHOCK (MULTI): ICD-10-CM

## 2024-04-30 DIAGNOSIS — I38 ENDOCARDITIS, VALVE UNSPECIFIED: ICD-10-CM

## 2024-04-30 DIAGNOSIS — R65.10 SIRS (SYSTEMIC INFLAMMATORY RESPONSE SYNDROME) (MULTI): Primary | ICD-10-CM

## 2024-04-30 DIAGNOSIS — M79.604 PAIN IN RIGHT LEG: ICD-10-CM

## 2024-04-30 LAB
ALBUMIN SERPL BCP-MCNC: 3.4 G/DL (ref 3.4–5)
ALP SERPL-CCNC: 156 U/L (ref 33–120)
ALT SERPL W P-5'-P-CCNC: 10 U/L (ref 10–52)
ANION GAP SERPL CALC-SCNC: 34 MMOL/L (ref 10–20)
AST SERPL W P-5'-P-CCNC: 10 U/L (ref 9–39)
BASOPHILS # BLD AUTO: 0.06 X10*3/UL (ref 0–0.1)
BASOPHILS NFR BLD AUTO: 0.6 %
BILIRUB SERPL-MCNC: 1.3 MG/DL (ref 0–1.2)
BUN SERPL-MCNC: 72 MG/DL (ref 6–23)
CALCIUM SERPL-MCNC: 9.3 MG/DL (ref 8.6–10.3)
CHLORIDE SERPL-SCNC: 87 MMOL/L (ref 98–107)
CO2 SERPL-SCNC: 16 MMOL/L (ref 21–32)
CREAT SERPL-MCNC: 9.76 MG/DL (ref 0.5–1.3)
EGFRCR SERPLBLD CKD-EPI 2021: 6 ML/MIN/1.73M*2
EOSINOPHIL # BLD AUTO: 0.01 X10*3/UL (ref 0–0.7)
EOSINOPHIL NFR BLD AUTO: 0.1 %
ERYTHROCYTE [DISTWIDTH] IN BLOOD BY AUTOMATED COUNT: 18.5 % (ref 11.5–14.5)
FLUAV RNA RESP QL NAA+PROBE: NOT DETECTED
FLUBV RNA RESP QL NAA+PROBE: NOT DETECTED
GLUCOSE BLD MANUAL STRIP-MCNC: 359 MG/DL (ref 74–99)
GLUCOSE SERPL-MCNC: 241 MG/DL (ref 74–99)
HCT VFR BLD AUTO: 34.7 % (ref 41–52)
HGB BLD-MCNC: 11.2 G/DL (ref 13.5–17.5)
IMM GRANULOCYTES # BLD AUTO: 0.09 X10*3/UL (ref 0–0.7)
IMM GRANULOCYTES NFR BLD AUTO: 0.8 % (ref 0–0.9)
LACTATE SERPL-SCNC: 0.9 MMOL/L (ref 0.4–2)
LYMPHOCYTES # BLD AUTO: 0.75 X10*3/UL (ref 1.2–4.8)
LYMPHOCYTES NFR BLD AUTO: 7.1 %
MAGNESIUM SERPL-MCNC: 2.1 MG/DL (ref 1.6–2.4)
MCH RBC QN AUTO: 28 PG (ref 26–34)
MCHC RBC AUTO-ENTMCNC: 32.3 G/DL (ref 32–36)
MCV RBC AUTO: 87 FL (ref 80–100)
MONOCYTES # BLD AUTO: 0.87 X10*3/UL (ref 0.1–1)
MONOCYTES NFR BLD AUTO: 8.2 %
NEUTROPHILS # BLD AUTO: 8.84 X10*3/UL (ref 1.2–7.7)
NEUTROPHILS NFR BLD AUTO: 83.2 %
NRBC BLD-RTO: 0 /100 WBCS (ref 0–0)
PLATELET # BLD AUTO: 101 X10*3/UL (ref 150–450)
POTASSIUM SERPL-SCNC: 4.7 MMOL/L (ref 3.5–5.3)
PROT SERPL-MCNC: 7.5 G/DL (ref 6.4–8.2)
RBC # BLD AUTO: 4 X10*6/UL (ref 4.5–5.9)
SARS-COV-2 RNA RESP QL NAA+PROBE: NOT DETECTED
SODIUM SERPL-SCNC: 132 MMOL/L (ref 136–145)
WBC # BLD AUTO: 10.6 X10*3/UL (ref 4.4–11.3)

## 2024-04-30 PROCEDURE — 36415 COLL VENOUS BLD VENIPUNCTURE: CPT | Performed by: EMERGENCY MEDICINE

## 2024-04-30 PROCEDURE — 83735 ASSAY OF MAGNESIUM: CPT | Performed by: SURGERY

## 2024-04-30 PROCEDURE — 72128 CT CHEST SPINE W/O DYE: CPT | Mod: FOREIGN READ | Performed by: RADIOLOGY

## 2024-04-30 PROCEDURE — 82947 ASSAY GLUCOSE BLOOD QUANT: CPT

## 2024-04-30 PROCEDURE — 71045 X-RAY EXAM CHEST 1 VIEW: CPT

## 2024-04-30 PROCEDURE — 96365 THER/PROPH/DIAG IV INF INIT: CPT

## 2024-04-30 PROCEDURE — 72131 CT LUMBAR SPINE W/O DYE: CPT

## 2024-04-30 PROCEDURE — 2500000004 HC RX 250 GENERAL PHARMACY W/ HCPCS (ALT 636 FOR OP/ED): Performed by: EMERGENCY MEDICINE

## 2024-04-30 PROCEDURE — 2500000001 HC RX 250 WO HCPCS SELF ADMINISTERED DRUGS (ALT 637 FOR MEDICARE OP): Performed by: SPECIALIST

## 2024-04-30 PROCEDURE — 5A1D70Z PERFORMANCE OF URINARY FILTRATION, INTERMITTENT, LESS THAN 6 HOURS PER DAY: ICD-10-PCS | Performed by: INTERNAL MEDICINE

## 2024-04-30 PROCEDURE — 99291 CRITICAL CARE FIRST HOUR: CPT | Performed by: EMERGENCY MEDICINE

## 2024-04-30 PROCEDURE — 85025 COMPLETE CBC W/AUTO DIFF WBC: CPT | Performed by: SURGERY

## 2024-04-30 PROCEDURE — 83605 ASSAY OF LACTIC ACID: CPT | Performed by: EMERGENCY MEDICINE

## 2024-04-30 PROCEDURE — 87040 BLOOD CULTURE FOR BACTERIA: CPT | Mod: 91,AHULAB | Performed by: EMERGENCY MEDICINE

## 2024-04-30 PROCEDURE — 72170 X-RAY EXAM OF PELVIS: CPT

## 2024-04-30 PROCEDURE — 87636 SARSCOV2 & INF A&B AMP PRB: CPT | Performed by: EMERGENCY MEDICINE

## 2024-04-30 PROCEDURE — 80053 COMPREHEN METABOLIC PANEL: CPT | Performed by: SURGERY

## 2024-04-30 PROCEDURE — 93971 EXTREMITY STUDY: CPT | Performed by: SURGERY

## 2024-04-30 PROCEDURE — 72128 CT CHEST SPINE W/O DYE: CPT

## 2024-04-30 PROCEDURE — 72170 X-RAY EXAM OF PELVIS: CPT | Mod: FOREIGN READ | Performed by: RADIOLOGY

## 2024-04-30 PROCEDURE — 71045 X-RAY EXAM CHEST 1 VIEW: CPT | Mod: FOREIGN READ | Performed by: RADIOLOGY

## 2024-04-30 PROCEDURE — 96367 TX/PROPH/DG ADDL SEQ IV INF: CPT

## 2024-04-30 PROCEDURE — 72131 CT LUMBAR SPINE W/O DYE: CPT | Mod: FOREIGN READ | Performed by: RADIOLOGY

## 2024-04-30 PROCEDURE — 99285 EMERGENCY DEPT VISIT HI MDM: CPT | Mod: 25

## 2024-04-30 PROCEDURE — 1100000001 HC PRIVATE ROOM DAILY

## 2024-04-30 PROCEDURE — 93971 EXTREMITY STUDY: CPT

## 2024-04-30 RX ORDER — DEXTROSE 50 % IN WATER (D50W) INTRAVENOUS SYRINGE
12.5
Status: DISCONTINUED | OUTPATIENT
Start: 2024-04-30 | End: 2024-05-04

## 2024-04-30 RX ORDER — TALC
3 POWDER (GRAM) TOPICAL NIGHTLY PRN
Status: DISCONTINUED | OUTPATIENT
Start: 2024-04-30 | End: 2024-05-04

## 2024-04-30 RX ORDER — ONDANSETRON HYDROCHLORIDE 2 MG/ML
4 INJECTION, SOLUTION INTRAVENOUS EVERY 6 HOURS PRN
Status: DISCONTINUED | OUTPATIENT
Start: 2024-04-30 | End: 2024-05-26 | Stop reason: HOSPADM

## 2024-04-30 RX ORDER — POLYETHYLENE GLYCOL 3350 17 G/17G
17 POWDER, FOR SOLUTION ORAL
COMMUNITY
Start: 2024-04-03 | End: 2024-06-01 | Stop reason: HOSPADM

## 2024-04-30 RX ORDER — SENNOSIDES 8.6 MG/1
8.6 TABLET ORAL 2 TIMES DAILY
COMMUNITY
Start: 2024-04-03 | End: 2024-06-01 | Stop reason: HOSPADM

## 2024-04-30 RX ORDER — OXYCODONE HYDROCHLORIDE 5 MG/1
5 TABLET ORAL EVERY 4 HOURS PRN
Status: DISCONTINUED | OUTPATIENT
Start: 2024-04-30 | End: 2024-05-26 | Stop reason: HOSPADM

## 2024-04-30 RX ORDER — POLYETHYLENE GLYCOL 3350 17 G/17G
17 POWDER, FOR SOLUTION ORAL
Status: DISCONTINUED | OUTPATIENT
Start: 2024-05-01 | End: 2024-05-26 | Stop reason: HOSPADM

## 2024-04-30 RX ORDER — NITROGLYCERIN 0.4 MG/1
0.4 TABLET SUBLINGUAL EVERY 5 MIN PRN
Status: DISCONTINUED | OUTPATIENT
Start: 2024-04-30 | End: 2024-05-26 | Stop reason: HOSPADM

## 2024-04-30 RX ORDER — NITROGLYCERIN 0.4 MG/1
0.4 TABLET SUBLINGUAL EVERY 5 MIN PRN
COMMUNITY
Start: 2022-11-11

## 2024-04-30 RX ORDER — SENNOSIDES 8.6 MG/1
8.6 TABLET ORAL 2 TIMES DAILY
Status: DISCONTINUED | OUTPATIENT
Start: 2024-04-30 | End: 2024-05-26 | Stop reason: HOSPADM

## 2024-04-30 RX ORDER — VANCOMYCIN HYDROCHLORIDE 1 G/200ML
1000 INJECTION, SOLUTION INTRAVENOUS ONCE
Status: COMPLETED | OUTPATIENT
Start: 2024-04-30 | End: 2024-04-30

## 2024-04-30 RX ORDER — ATORVASTATIN CALCIUM 40 MG/1
40 TABLET, FILM COATED ORAL DAILY
COMMUNITY
Start: 2022-04-27 | End: 2024-05-26 | Stop reason: HOSPADM

## 2024-04-30 RX ORDER — LOPERAMIDE HYDROCHLORIDE 2 MG/1
4 CAPSULE ORAL EVERY 2 HOUR PRN
COMMUNITY

## 2024-04-30 RX ORDER — ATORVASTATIN CALCIUM 40 MG/1
40 TABLET, FILM COATED ORAL NIGHTLY
Status: DISCONTINUED | OUTPATIENT
Start: 2024-04-30 | End: 2024-05-16

## 2024-04-30 RX ORDER — LEVOTHYROXINE SODIUM 125 UG/1
125 TABLET ORAL
Status: DISCONTINUED | OUTPATIENT
Start: 2024-05-01 | End: 2024-05-26 | Stop reason: HOSPADM

## 2024-04-30 RX ORDER — INSULIN LISPRO 100 [IU]/ML
0-5 INJECTION, SOLUTION INTRAVENOUS; SUBCUTANEOUS
Status: DISCONTINUED | OUTPATIENT
Start: 2024-05-01 | End: 2024-05-04

## 2024-04-30 RX ORDER — ACETAMINOPHEN 325 MG/1
975 TABLET ORAL ONCE
Status: COMPLETED | OUTPATIENT
Start: 2024-04-30 | End: 2024-04-30

## 2024-04-30 RX ORDER — SIMETHICONE 80 MG
80 TABLET,CHEWABLE ORAL EVERY 8 HOURS PRN
Status: DISCONTINUED | OUTPATIENT
Start: 2024-04-30 | End: 2024-05-26 | Stop reason: HOSPADM

## 2024-04-30 RX ORDER — INSULIN GLARGINE 100 [IU]/ML
12 INJECTION, SOLUTION SUBCUTANEOUS NIGHTLY
COMMUNITY
Start: 2024-04-04 | End: 2024-05-26 | Stop reason: HOSPADM

## 2024-04-30 RX ORDER — SEVELAMER CARBONATE 800 MG/1
2400 TABLET, FILM COATED ORAL
Status: DISCONTINUED | OUTPATIENT
Start: 2024-05-01 | End: 2024-05-26 | Stop reason: HOSPADM

## 2024-04-30 RX ORDER — DEXTROSE 50 % IN WATER (D50W) INTRAVENOUS SYRINGE
25
Status: DISCONTINUED | OUTPATIENT
Start: 2024-04-30 | End: 2024-05-04

## 2024-04-30 RX ORDER — LOPERAMIDE HYDROCHLORIDE 2 MG/1
2 CAPSULE ORAL EVERY 4 HOURS PRN
Status: DISCONTINUED | OUTPATIENT
Start: 2024-04-30 | End: 2024-05-26 | Stop reason: HOSPADM

## 2024-04-30 RX ADMIN — ACETAMINOPHEN 975 MG: 325 TABLET ORAL at 14:11

## 2024-04-30 RX ADMIN — PIPERACILLIN SODIUM AND TAZOBACTAM SODIUM 3.38 G: 3; .375 INJECTION, SOLUTION INTRAVENOUS at 14:11

## 2024-04-30 RX ADMIN — APIXABAN 5 MG: 5 TABLET, FILM COATED ORAL at 22:52

## 2024-04-30 RX ADMIN — OXYCODONE HYDROCHLORIDE 5 MG: 5 TABLET ORAL at 22:52

## 2024-04-30 RX ADMIN — VANCOMYCIN HYDROCHLORIDE 1000 MG: 1 INJECTION, SOLUTION INTRAVENOUS at 14:51

## 2024-04-30 SDOH — SOCIAL STABILITY: SOCIAL INSECURITY: WERE YOU ABLE TO COMPLETE ALL THE BEHAVIORAL HEALTH SCREENINGS?: YES

## 2024-04-30 SDOH — SOCIAL STABILITY: SOCIAL INSECURITY: HAS ANYONE EVER THREATENED TO HURT YOUR FAMILY OR YOUR PETS?: NO

## 2024-04-30 SDOH — SOCIAL STABILITY: SOCIAL INSECURITY: ABUSE: ADULT

## 2024-04-30 SDOH — SOCIAL STABILITY: SOCIAL INSECURITY: ARE YOU OR HAVE YOU BEEN THREATENED OR ABUSED PHYSICALLY, EMOTIONALLY, OR SEXUALLY BY ANYONE?: NO

## 2024-04-30 SDOH — SOCIAL STABILITY: SOCIAL INSECURITY: ARE THERE ANY APPARENT SIGNS OF INJURIES/BEHAVIORS THAT COULD BE RELATED TO ABUSE/NEGLECT?: NO

## 2024-04-30 SDOH — SOCIAL STABILITY: SOCIAL INSECURITY: DO YOU FEEL ANYONE HAS EXPLOITED OR TAKEN ADVANTAGE OF YOU FINANCIALLY OR OF YOUR PERSONAL PROPERTY?: NO

## 2024-04-30 SDOH — SOCIAL STABILITY: SOCIAL INSECURITY: DO YOU FEEL UNSAFE GOING BACK TO THE PLACE WHERE YOU ARE LIVING?: NO

## 2024-04-30 SDOH — SOCIAL STABILITY: SOCIAL INSECURITY: HAVE YOU HAD THOUGHTS OF HARMING ANYONE ELSE?: NO

## 2024-04-30 SDOH — SOCIAL STABILITY: SOCIAL INSECURITY: DOES ANYONE TRY TO KEEP YOU FROM HAVING/CONTACTING OTHER FRIENDS OR DOING THINGS OUTSIDE YOUR HOME?: NO

## 2024-04-30 ASSESSMENT — ACTIVITIES OF DAILY LIVING (ADL)
DRESSING YOURSELF: NEEDS ASSISTANCE
ASSISTIVE_DEVICE: WHEELCHAIR
FEEDING YOURSELF: NEEDS ASSISTANCE
JUDGMENT_ADEQUATE_SAFELY_COMPLETE_DAILY_ACTIVITIES: YES
ADEQUATE_TO_COMPLETE_ADL: YES
LACK_OF_TRANSPORTATION: NO
PATIENT'S MEMORY ADEQUATE TO SAFELY COMPLETE DAILY ACTIVITIES?: YES
HEARING - LEFT EAR: FUNCTIONAL
BATHING: NEEDS ASSISTANCE
HEARING - RIGHT EAR: FUNCTIONAL
TOILETING: NEEDS ASSISTANCE
BATHING: NEEDS ASSISTANCE
WALKS IN HOME: DEPENDENT
ADEQUATE_TO_COMPLETE_ADL: YES
BATHING: NEEDS ASSISTANCE
PATIENT'S MEMORY ADEQUATE TO SAFELY COMPLETE DAILY ACTIVITIES?: YES
DRESSING YOURSELF: NEEDS ASSISTANCE
HEARING - RIGHT EAR: FUNCTIONAL
FEEDING YOURSELF: NEEDS ASSISTANCE
ASSISTIVE_DEVICE: WHEELCHAIR
TOILETING: NEEDS ASSISTANCE
HEARING - LEFT EAR: FUNCTIONAL
WALKS IN HOME: NEEDS ASSISTANCE
GROOMING: NEEDS ASSISTANCE
WALKS IN HOME: DEPENDENT
TOILETING: DEPENDENT
HEARING - LEFT EAR: FUNCTIONAL
GROOMING: NEEDS ASSISTANCE
PATIENT'S MEMORY ADEQUATE TO SAFELY COMPLETE DAILY ACTIVITIES?: YES
HEARING - RIGHT EAR: FUNCTIONAL
JUDGMENT_ADEQUATE_SAFELY_COMPLETE_DAILY_ACTIVITIES: YES
GROOMING: NEEDS ASSISTANCE
DRESSING YOURSELF: NEEDS ASSISTANCE
JUDGMENT_ADEQUATE_SAFELY_COMPLETE_DAILY_ACTIVITIES: YES
FEEDING YOURSELF: INDEPENDENT
ADEQUATE_TO_COMPLETE_ADL: YES
ASSISTIVE_DEVICE: WHEELCHAIR

## 2024-04-30 ASSESSMENT — LIFESTYLE VARIABLES
SKIP TO QUESTIONS 9-10: 1
AUDIT-C TOTAL SCORE: 0
AUDIT-C TOTAL SCORE: 0
HOW OFTEN DO YOU HAVE A DRINK CONTAINING ALCOHOL: NEVER
HOW MANY STANDARD DRINKS CONTAINING ALCOHOL DO YOU HAVE ON A TYPICAL DAY: PATIENT DOES NOT DRINK
HOW OFTEN DO YOU HAVE 6 OR MORE DRINKS ON ONE OCCASION: NEVER

## 2024-04-30 ASSESSMENT — COGNITIVE AND FUNCTIONAL STATUS - GENERAL
CLIMB 3 TO 5 STEPS WITH RAILING: TOTAL
WALKING IN HOSPITAL ROOM: TOTAL
DRESSING REGULAR UPPER BODY CLOTHING: A LITTLE
HELP NEEDED FOR BATHING: A LITTLE
TOILETING: A LITTLE
MOVING FROM LYING ON BACK TO SITTING ON SIDE OF FLAT BED WITH BEDRAILS: A LITTLE
TURNING FROM BACK TO SIDE WHILE IN FLAT BAD: A LOT
DAILY ACTIVITIY SCORE: 19
DRESSING REGULAR LOWER BODY CLOTHING: A LITTLE
STANDING UP FROM CHAIR USING ARMS: TOTAL
PATIENT BASELINE BEDBOUND: NO
MOBILITY SCORE: 10
MOVING TO AND FROM BED TO CHAIR: A LOT
PERSONAL GROOMING: A LITTLE

## 2024-04-30 ASSESSMENT — COLUMBIA-SUICIDE SEVERITY RATING SCALE - C-SSRS
6. HAVE YOU EVER DONE ANYTHING, STARTED TO DO ANYTHING, OR PREPARED TO DO ANYTHING TO END YOUR LIFE?: NO
2. HAVE YOU ACTUALLY HAD ANY THOUGHTS OF KILLING YOURSELF?: NO

## 2024-04-30 ASSESSMENT — PAIN SCALES - GENERAL
PAINLEVEL_OUTOF10: 7
PAINLEVEL_OUTOF10: 8
PAINLEVEL_OUTOF10: 1

## 2024-04-30 ASSESSMENT — PAIN - FUNCTIONAL ASSESSMENT
PAIN_FUNCTIONAL_ASSESSMENT: 0-10
PAIN_FUNCTIONAL_ASSESSMENT: 0-10

## 2024-04-30 ASSESSMENT — PAIN DESCRIPTION - LOCATION: LOCATION: GENERALIZED

## 2024-04-30 ASSESSMENT — PAIN DESCRIPTION - PAIN TYPE: TYPE: ACUTE PAIN

## 2024-04-30 NOTE — ED PROVIDER NOTES
HPI   No chief complaint on file.      Chief complaint: Back pain, leg pain    History of present illness: Patient is a 39-year-old male with complicated mass past medical history including end-stage renal disease dialysis dependence, peripheral vascular disease, hypertension, presenting to the emergency department with complaints of pain in his back and legs.  The patient lives in a nursing facility and the patient states that he has been relatively compliant with his dialysis sessions although he missed the most recent dialysis session as he was feeling so unwell..  The patient states that for the past 2 days he has been feeling generally unwell.  According to the patient's records, the patient has a complicated history with dialysis access and as result has a dialysis catheter in his groin.  The patient states that he is having pain in his back and his legs.  The patient denies any cough he denies any abdominal discomfort.  Concern, the patient presents to the emergency department for further evaluation.  The patient is status post left-sided above-the-knee amputee Tatian however the patient has been complaining of pain and swelling in his right lower extremity.      History provided by:  Patient   used: No                        No data recorded                   Patient History   Past Medical History:   Diagnosis Date    Chronic kidney disease     Diabetes mellitus (Multi)     ESRD (end stage renal disease) (Multi)     Essential (primary) hypertension 05/26/2017    HTN (hypertension), benign    GERD (gastroesophageal reflux disease)     Hyperlipidemia     Hypothyroidism      Past Surgical History:   Procedure Laterality Date    ARM AMPUTATION AT ELBOW Right 05/2021    AV FISTULA PLACEMENT W/ PTFE      CT AORTA AND BILATERAL ILIOFEMORAL RUNOFF ANGIOGRAM W AND/OR WO IV CONTRAST  02/09/2023    CT AORTA AND BILATERAL ILIOFEMORAL RUNOFF ANGIOGRAM W AND/OR WO IV CONTRAST 2/9/2023 [facility]    IR CVC EXCHANGE  11/13/2023    IR CVC EXCHANGE 11/13/2023 [facility]    IR CVC PICC  10/19/2023    IR CVC PICC    IR CVC PICC  2/28/2022    IR CVC PICC    LEG AMPUTATION THROUGH KNEE Left 07/08/2023    LEG AMPUTATION THROUGH LOWER TIBIA AND FIBULA Left 07/05/2023    TOE AMPUTATION Left 02/17/2023    left 4th    WOUND DEBRIDEMENT Left 07/26/2023    leg    WOUND DEBRIDEMENT Left 08/02/2023    multiple leg debridements     Family History   Problem Relation Name Age of Onset    Other (DIABETES DUE TO UNDERLYING CONDITION WITH MICROALBUMINURIA) Father      Other (DIABETES DUE TO UNDERLYING CONDITION WITH MICROALBUMINURIA [Other]) Other AUNT     Other (DIABETES DUE TO UNDERLYING CONDITION WITH MICROALBUMINURIA [Other]) Other GP      Social History     Tobacco Use    Smoking status: Every Day     Current packs/day: 0.25     Average packs/day: 0.3 packs/day for 15.0 years (3.8 ttl pk-yrs)     Types: Cigarettes    Smokeless tobacco: Never   Substance Use Topics    Alcohol use: Not on file    Drug use: Not on file       Physical Exam   ED Triage Vitals   Temperature Heart Rate Respirations BP   04/30/24 0825 04/30/24 0825 04/30/24 0825 04/30/24 0825   37.2 °C (99 °F) (!) 120 16 123/83      Pulse Ox Temp Source Heart Rate Source Patient Position   04/30/24 0825 04/30/24 1202 04/30/24 1202 --   97 % Oral Monitor       BP Location FiO2 (%)     04/30/24 1202 --     Left arm        Physical Exam  Vitals and nursing note reviewed.   Constitutional:       General: He is not in acute distress.     Appearance: He is well-developed.      Comments: Patient is a chronically ill-appearing male lying on his motorized wheelchair in the hallway.   HENT:      Head: Normocephalic and atraumatic.   Eyes:      Conjunctiva/sclera: Conjunctivae normal.   Cardiovascular:      Rate and Rhythm: Regular rhythm. Tachycardia present.      Heart sounds: No murmur heard.  Pulmonary:      Effort: Pulmonary effort is normal. No respiratory  distress.      Breath sounds: Normal breath sounds.   Abdominal:      Palpations: Abdomen is soft.      Tenderness: There is no abdominal tenderness.   Musculoskeletal:         General: No swelling.      Cervical back: Neck supple.      Right lower le+ Edema present.      Comments: Patient is status post above-the-knee amputation on the left.   Skin:     General: Skin is warm and dry.      Capillary Refill: Capillary refill takes less than 2 seconds.   Neurological:      Mental Status: He is alert.   Psychiatric:         Mood and Affect: Mood normal.         ED Course & MDM   Diagnoses as of 24 1305   SIRS (systemic inflammatory response syndrome) (Multi)       Medical Decision Making  Medical decision making: On arrival to the emergency department, the patient was experiencing pain in his lower back and his legs.  As result, CAT scan of the patient's thoracic spine and spine was performed which demonstrated no significant abnormalities CAT scan of the patient's lumbar spine also indicated no significant acute abnormalities meanwhile x-ray of the patient's pelvis demonstrated no significant abnormalities.  Chest x-ray demonstrated a right basilar infiltrate with associated pleural effusion.    Initially on his presentation to the emergency department, I had concerns for the patient's back and leg pain and on reevaluation, the patient feels warm to touch.  I checked the patient's temperature myself and found that the patient is febrile.  This is of significant concern especially given the patient's immunosuppression secondary to dialysis therapy as well as his tachycardia while in the emergency department.  I immediately became concerned for sepsis.  Blood cultures were obtained and the patient was given vancomycin and Zosyn.  I gave the patient pain medication during his time in the emergency department.  The patient CBC demonstrated no significant abnormalities Chem-7 and LFTs demonstrated changes  consistent with end-stage renal disease creatinine of 9.76 and anion gap of 32 bicarb of only 16.  Influenza and COVID screens were negative lactate was 0.9 COVID screen was negative.    I spoke with the hospitalist regarding this patient's case he informed me that the patient is known to have episodes of sepsis in the past likely secondary to his dialysis catheter which is in his groin.  I explained that the chest x-ray demonstrated a possible pneumonia.  The patient was then accepted to the hospitalist service in otherwise stable condition.    Amount and/or Complexity of Data Reviewed  Labs: ordered. Decision-making details documented in ED Course.  Radiology: ordered. Decision-making details documented in ED Course.        Procedure  Critical Care    Performed by: Dilip Salter MD  Authorized by: Dilip Salter MD    Critical care provider statement:     Critical care time (minutes):  35    Critical care time was exclusive of:  Separately billable procedures and treating other patients    Critical care was necessary to treat or prevent imminent or life-threatening deterioration of the following conditions:  Sepsis    Critical care was time spent personally by me on the following activities:  Blood draw for specimens, development of treatment plan with patient or surrogate, discussions with primary provider, ordering and performing treatments and interventions, ordering and review of laboratory studies, ordering and review of radiographic studies, pulse oximetry and re-evaluation of patient's condition    Care discussed with: admitting provider         Dilip Salter MD  05/09/24 [id number]

## 2024-04-30 NOTE — Clinical Note
SUBJECTIVE     Follow Up      HPI  Edenilson Huber is a 38 y.o. male with hypertriglyceridemia that presents for six-month follow-up    Family, social, surgical Hx reviewed     Health Maintenance         Date Due Completion Date    Hepatitis C Screening Never done ---    Lipid Panel Never done ---    COVID-19 Vaccine (1) Never done ---    HIV Screening Never done ---    TETANUS VACCINE Never done ---    Influenza Vaccine (1) Never done ---                Chronic Conditions:    Chronic Sciatic nerve pain:  -L sided  -From low back down L buttocks/thigh  -No weakness  -Tylenol/ibuprofen prn with some relief    Hypertriglyceridemia:  -previously prescribed Gemfibrozil 600mg daily.  States he has not taken in a few months.    Obesity:  BMI 33.52 in clinic today.  Not currently exercising or making dietary changes.    PAST MEDICAL HISTORY:  Past Medical History:   Diagnosis Date    Heartburn     Neuropathy        PAST SURGICAL HISTORY:  History reviewed. No pertinent surgical history.    SOCIAL HISTORY:  Social History     Socioeconomic History    Marital status:    Tobacco Use    Smoking status: Former     Current packs/day: 0.00     Types: Cigars, Vaping w/o nicotine, Cigarettes     Quit date: 2005     Years since quittin.1    Smokeless tobacco: Never   Substance and Sexual Activity    Alcohol use: Yes     Comment: About 3, 4 times a year.    Drug use: Never    Sexual activity: Yes     Partners: Female     Birth control/protection: Other-see comments     Comment: Partner on the pill       FAMILY HISTORY:  Family History   Problem Relation Age of Onset    Cancer Mother         Skin    Arthritis Father     Heart disease Maternal Grandfather         Heart Attack    Hypertension Maternal Grandfather     Cancer Maternal Grandmother         Lung       ALLERGIES AND MEDICATIONS: updated and reviewed.  Review of patient's allergies indicates:   Allergen Reactions    Pollen extracts Other (See Comments)      Universal procedure checklist and airway assessment completed. "Rhinitis     Current Outpatient Medications   Medication Sig Dispense Refill    cetirizine 10 mg TbDL Take 10 mg by mouth once daily.      famotidine (PEPCID) 20 MG tablet Take 20 mg by mouth once daily.      gemfibroziL (LOPID) 600 MG tablet Take 600 mg by mouth every evening.      ibuprofen (ADVIL,MOTRIN) 800 MG tablet Take 1 tablet (800 mg total) by mouth 3 (three) times daily as needed for Pain. 30 tablet 0    naproxen (NAPROSYN) 375 MG tablet Take 1 tablet (375 mg total) by mouth 2 (two) times daily with meals. (Patient not taking: Reported on 8/18/2023) 60 tablet 0     No current facility-administered medications for this visit.       ROS  Review of Systems   Constitutional:  Negative for fever and weight loss.   HENT:  Negative for hearing loss.    Eyes:  Negative for discharge.   Respiratory:  Negative for cough, shortness of breath and wheezing.    Cardiovascular:  Negative for chest pain and palpitations.   Gastrointestinal:  Negative for abdominal pain, blood in stool, constipation, diarrhea, nausea and vomiting.   Genitourinary:  Negative for dysuria, hematuria and urgency.   Musculoskeletal:  Positive for back pain. Negative for joint pain and neck pain.   Skin:  Negative for rash.   Neurological:  Negative for dizziness, weakness and headaches.   Endo/Heme/Allergies:  Negative for polydipsia.   Psychiatric/Behavioral:  Negative for depression. The patient is not nervous/anxious.            OBJECTIVE     Physical Exam  Vitals:    08/18/23 1500   BP: 120/84   Pulse:     Body mass index is 33.52 kg/m².  Weight: 94.2 kg (207 lb 10.8 oz)   Height: 5' 6" (167.6 cm)     Physical Exam  HENT:      Head: Normocephalic and atraumatic.      Nose: Nose normal.      Mouth/Throat:      Mouth: Mucous membranes are moist.      Pharynx: Oropharynx is clear.   Eyes:      Extraocular Movements: Extraocular movements intact.      Conjunctiva/sclera: Conjunctivae normal.      Pupils: Pupils are equal, round, and reactive to " light.   Cardiovascular:      Rate and Rhythm: Normal rate and regular rhythm.   Pulmonary:      Effort: Pulmonary effort is normal.      Breath sounds: Normal breath sounds.   Abdominal:      General: There is no distension.      Palpations: Abdomen is soft.      Tenderness: There is no abdominal tenderness.   Musculoskeletal:         General: No swelling. Normal range of motion.      Cervical back: Normal range of motion.      Right lower leg: No edema.      Left lower leg: No edema.   Skin:     General: Skin is warm.      Findings: No lesion or rash.   Neurological:      General: No focal deficit present.      Mental Status: He is alert and oriented to person, place, and time.      Motor: No weakness.   Psychiatric:         Mood and Affect: Mood normal.         Thought Content: Thought content normal.               ASSESSMENT     38 y.o. male with     1. Hypertriglyceridemia    2. Other long term (current) drug therapy    3. Sciatic nerve pain, unspecified laterality    4. Obesity (BMI 30.0-34.9)        PLAN:     1. Hypertriglyceridemia  Overview:  Prev on Gemfibrozil 600mg daily, not currently taking    Follow up lipid panel and restart if needed    Orders:  -     Lipid Panel; Future; Expected date: 08/18/2023    2. Other long term (current) drug therapy  -     Hemoglobin A1C; Future; Expected date: 08/18/2023  -     TSH; Future; Expected date: 08/18/2023  -     Lipid Panel; Future; Expected date: 08/18/2023  -     Comprehensive Metabolic Panel; Future; Expected date: 08/18/2023  -     CBC Auto Differential; Future; Expected date: 08/18/2023    3. Sciatic nerve pain, unspecified laterality  Overview:  Tylenol/Ibuprofen prn    Orders:  -     ibuprofen (ADVIL,MOTRIN) 800 MG tablet; Take 1 tablet (800 mg total) by mouth 3 (three) times daily as needed for Pain.  Dispense: 30 tablet; Refill: 0    4. Obesity (BMI 30.0-34.9)  -     Hemoglobin A1C; Future; Expected date: 08/18/2023        Discussed age and gender  appropriate screenings at this visit and encouraged a healthy diet low in simple carbohydrates, and increased physical activity.  Counseled on medically appropriate vaccines based on age and current health condition.  Screening test reviewed and discussed with patient.       RTC in 6 months    Ni Saleh MD        Follow up in about 1 year (around 8/18/2024).

## 2024-04-30 NOTE — ED NOTES
Pt was concerned about his blood sugar, finger stick checked at this time. Pt also c/o being uncomfortable. Pt was repositioned with a pillow for comfort.      Rafat Curtis RN  04/30/24 [id number]

## 2024-04-30 NOTE — ED TRIAGE NOTES
As provider-in-triage, I performed a medical screening history and physical exam on this patient.    HISTORY OF PRESENT ILLNESS   This is a 39-year-old male with a history of ESRD with HD, AKA on the left, DVT, type 2 diabetes, right arm amputation presenting with midline mid to lower back pain times couple days.  Denies injury.  Explains that the pain is worsened with positioning.  Explains that the pain migrates to his right lower back and sometimes right flank and down the right leg..  Patient's last dialysis was on Friday.  States that he could not go yesterday because he was too sick.  Denies numbness or tingling.  Denies fever chills or sweats.  No headache or dizziness.  No abdominal pain.  No NVD.     PHYSICAL EXAM  Vital Signs reviewed.  39-year-old male who appears a lot older than his age in a wheelchair.  PERRLA, EOMI.  Heart rate and rhythm regular.  Lungs are clear to auscultation bilaterally.  This pain was reproducible midline thoracic and lumbar and did have tenderness over the right flank.  Pain with internal rotation of the right hip.  Distal edema in the right.      MDM  Basic labs, CT imaging and DVT rule out           I evaluated this patient in triage with the RN. Due to the patients complaint labs and or imaging were ordered by myself in an attempt to expedite patient care however I am not participating in care after evaluation. This is a preliminary assessment. Pt does not appear in acute distress at this time. They are stable and will have a full evaluation as soon as possible. They will be cared for by another provider who will possibly order more labs, imaging or interventions.

## 2024-05-01 ENCOUNTER — APPOINTMENT (OUTPATIENT)
Dept: DIALYSIS | Facility: HOSPITAL | Age: 39
End: 2024-05-01
Payer: COMMERCIAL

## 2024-05-01 LAB
GLUCOSE BLD MANUAL STRIP-MCNC: 372 MG/DL (ref 74–99)
GLUCOSE BLD MANUAL STRIP-MCNC: 425 MG/DL (ref 74–99)
GLUCOSE BLD MANUAL STRIP-MCNC: 435 MG/DL (ref 74–99)

## 2024-05-01 PROCEDURE — 2500000004 HC RX 250 GENERAL PHARMACY W/ HCPCS (ALT 636 FOR OP/ED): Performed by: PHARMACIST

## 2024-05-01 PROCEDURE — 2500000004 HC RX 250 GENERAL PHARMACY W/ HCPCS (ALT 636 FOR OP/ED): Performed by: SPECIALIST

## 2024-05-01 PROCEDURE — 2500000002 HC RX 250 W HCPCS SELF ADMINISTERED DRUGS (ALT 637 FOR MEDICARE OP, ALT 636 FOR OP/ED): Performed by: SPECIALIST

## 2024-05-01 PROCEDURE — 99221 1ST HOSP IP/OBS SF/LOW 40: CPT | Performed by: INTERNAL MEDICINE

## 2024-05-01 PROCEDURE — 2500000006 HC RX 250 W HCPCS SELF ADMINISTERED DRUGS (ALT 637 FOR ALL PAYERS): Performed by: SPECIALIST

## 2024-05-01 PROCEDURE — 2500000001 HC RX 250 WO HCPCS SELF ADMINISTERED DRUGS (ALT 637 FOR MEDICARE OP): Performed by: SPECIALIST

## 2024-05-01 PROCEDURE — 36415 COLL VENOUS BLD VENIPUNCTURE: CPT | Performed by: INTERNAL MEDICINE

## 2024-05-01 PROCEDURE — 82947 ASSAY GLUCOSE BLOOD QUANT: CPT

## 2024-05-01 PROCEDURE — 87040 BLOOD CULTURE FOR BACTERIA: CPT | Mod: AHULAB | Performed by: INTERNAL MEDICINE

## 2024-05-01 PROCEDURE — 8010000001 HC DIALYSIS - HEMODIALYSIS PER DAY

## 2024-05-01 PROCEDURE — 1100000001 HC PRIVATE ROOM DAILY

## 2024-05-01 RX ORDER — VANCOMYCIN HYDROCHLORIDE 500 MG/100ML
500 INJECTION, SOLUTION INTRAVENOUS ONCE
Status: COMPLETED | OUTPATIENT
Start: 2024-05-01 | End: 2024-05-02

## 2024-05-01 RX ORDER — INSULIN LISPRO 100 [IU]/ML
5 INJECTION, SOLUTION INTRAVENOUS; SUBCUTANEOUS ONCE
Status: COMPLETED | OUTPATIENT
Start: 2024-05-01 | End: 2024-05-01

## 2024-05-01 RX ORDER — VANCOMYCIN HYDROCHLORIDE 1 G/20ML
INJECTION, POWDER, LYOPHILIZED, FOR SOLUTION INTRAVENOUS DAILY PRN
Status: DISCONTINUED | OUTPATIENT
Start: 2024-05-01 | End: 2024-05-16

## 2024-05-01 RX ADMIN — APIXABAN 5 MG: 5 TABLET, FILM COATED ORAL at 22:12

## 2024-05-01 RX ADMIN — PIPERACILLIN SODIUM AND TAZOBACTAM SODIUM 2.25 G: 2; .25 INJECTION, SOLUTION INTRAVENOUS at 05:21

## 2024-05-01 RX ADMIN — INSULIN LISPRO 5 UNITS: 100 INJECTION, SOLUTION INTRAVENOUS; SUBCUTANEOUS at 13:18

## 2024-05-01 RX ADMIN — LEVOTHYROXINE SODIUM 125 MCG: 125 TABLET ORAL at 06:30

## 2024-05-01 RX ADMIN — INSULIN LISPRO 5 UNITS: 100 INJECTION, SOLUTION INTRAVENOUS; SUBCUTANEOUS at 12:53

## 2024-05-01 RX ADMIN — APIXABAN 5 MG: 5 TABLET, FILM COATED ORAL at 09:54

## 2024-05-01 RX ADMIN — NEPHROCAP 1 CAPSULE: 1 CAP ORAL at 09:00

## 2024-05-01 RX ADMIN — INSULIN LISPRO 5 UNITS: 100 INJECTION, SOLUTION INTRAVENOUS; SUBCUTANEOUS at 18:24

## 2024-05-01 RX ADMIN — OXYCODONE HYDROCHLORIDE 5 MG: 5 TABLET ORAL at 05:20

## 2024-05-01 RX ADMIN — INSULIN LISPRO 5 UNITS: 100 INJECTION, SOLUTION INTRAVENOUS; SUBCUTANEOUS at 09:55

## 2024-05-01 SDOH — ECONOMIC STABILITY: TRANSPORTATION INSECURITY
IN THE PAST 12 MONTHS, HAS LACK OF TRANSPORTATION KEPT YOU FROM MEETINGS, WORK, OR FROM GETTING THINGS NEEDED FOR DAILY LIVING?: NO

## 2024-05-01 SDOH — ECONOMIC STABILITY: INCOME INSECURITY: IN THE LAST 12 MONTHS, WAS THERE A TIME WHEN YOU WERE NOT ABLE TO PAY THE MORTGAGE OR RENT ON TIME?: NO

## 2024-05-01 SDOH — HEALTH STABILITY: MENTAL HEALTH: HOW MANY STANDARD DRINKS CONTAINING ALCOHOL DO YOU HAVE ON A TYPICAL DAY?: PATIENT DOES NOT DRINK

## 2024-05-01 SDOH — HEALTH STABILITY: MENTAL HEALTH: HOW OFTEN DO YOU HAVE A DRINK CONTAINING ALCOHOL?: NEVER

## 2024-05-01 SDOH — ECONOMIC STABILITY: HOUSING INSECURITY
IN THE LAST 12 MONTHS, WAS THERE A TIME WHEN YOU DID NOT HAVE A STEADY PLACE TO SLEEP OR SLEPT IN A SHELTER (INCLUDING NOW)?: NO

## 2024-05-01 SDOH — ECONOMIC STABILITY: INCOME INSECURITY: HOW HARD IS IT FOR YOU TO PAY FOR THE VERY BASICS LIKE FOOD, HOUSING, MEDICAL CARE, AND HEATING?: NOT VERY HARD

## 2024-05-01 SDOH — ECONOMIC STABILITY: TRANSPORTATION INSECURITY
IN THE PAST 12 MONTHS, HAS THE LACK OF TRANSPORTATION KEPT YOU FROM MEDICAL APPOINTMENTS OR FROM GETTING MEDICATIONS?: NO

## 2024-05-01 ASSESSMENT — COGNITIVE AND FUNCTIONAL STATUS - GENERAL
WALKING IN HOSPITAL ROOM: TOTAL
MOVING FROM LYING ON BACK TO SITTING ON SIDE OF FLAT BED WITH BEDRAILS: A LITTLE
STANDING UP FROM CHAIR USING ARMS: TOTAL
TURNING FROM BACK TO SIDE WHILE IN FLAT BAD: A LITTLE
CLIMB 3 TO 5 STEPS WITH RAILING: TOTAL
MOVING TO AND FROM BED TO CHAIR: TOTAL
HELP NEEDED FOR BATHING: A LOT
TOILETING: A LOT
MOBILITY SCORE: 10
DAILY ACTIVITIY SCORE: 16
PERSONAL GROOMING: A LITTLE
DRESSING REGULAR LOWER BODY CLOTHING: A LOT
DRESSING REGULAR UPPER BODY CLOTHING: A LITTLE

## 2024-05-01 ASSESSMENT — PAIN - FUNCTIONAL ASSESSMENT: PAIN_FUNCTIONAL_ASSESSMENT: 0-10

## 2024-05-01 ASSESSMENT — PAIN SCALES - GENERAL: PAINLEVEL_OUTOF10: 8

## 2024-05-01 NOTE — NURSING NOTE
Pt refused waffle mattress, explained the importance of it but he refused it, stating he don't need it

## 2024-05-01 NOTE — PROGRESS NOTES
Edwar Hemphill is a 39 y.o. male on day 1 of admission presenting with SIRS (systemic inflammatory response syndrome) (Multi).      Subjective   Pain better   No fever now  Had vanco last night       Objective     Last Recorded Vitals  BP 98/59 (BP Location: Left arm, Patient Position: Lying)   Pulse (!) 114   Temp 36.3 °C (97.3 °F) (Oral)   Resp 18   Wt 65.8 kg (145 lb)   SpO2 99%   Intake/Output last 3 Shifts:    Intake/Output Summary (Last 24 hours) at 5/1/2024 0846  Last data filed at 4/30/2024 1441  Gross per 24 hour   Intake 50 ml   Output --   Net 50 ml       Admission Weight  Weight: 65.8 kg (145 lb) (04/30/24 0825)    Daily Weight  04/30/24 : 65.8 kg (145 lb)    Image Results  XR chest 1 view  Narrative: STUDY:  Chest Radiograph;  4/30/2024 at 2:42 PM.  INDICATION:  Fever.  COMPARISON:  XR chest 1/14/2024, 11/10/2023; CTA chest 2/15/2023.  ACCESSION NUMBER(S):  [id number]  ORDERING CLINICIAN:  WENDY INFANTE  TECHNIQUE:  Frontal chest was obtained at 14:42 hours.  FINDINGS:  CARDIOMEDIASTINAL SILHOUETTE:  Cardiomediastinal silhouette is normal in size and configuration.     LUNGS:  Patchy right basilar infiltrate or atelectasis with small right  pleural effusion. Left basilar scar versus atelectasis.  ABDOMEN:  No remarkable upper abdominal findings.     BONES:  No acute osseous changes.  Impression: Patchy right basilar infiltrate or atelectasis with small right  pleural effusion.  Signed by Joseph Overton MD  CT lumbar spine wo IV contrast  Narrative: STUDY:  CT Thoracic Spine and Lumbar Spine without IV Contrast; 4/30/2024  11:11 AM  INDICATION:  Midline back pain.  COMPARISON:  None Available.  ACCESSION NUMBER(S):  [id number], [id number]  ORDERING CLINICIAN:  RAJNI FARMER  TECHNIQUE:  CT of the thoracic spine and lumbar spine was performed  without intravenous or intrathecal contrast.  Sagittal and coronal  reconstructions were generated.    Automated mA/kV exposure control was utilized and  patient examination  was performed in strict accordance with principles of ALARA.  FINDINGS:  Portions of the T1 vertebral body, as well as a small portion of the  anterior superior aspect of T2 is cut off on this study.  No  compression deformities are visualized within the thoracic vertebral  bodies from T2 through T12.  No spondylolisthesis is noted.  No  pedicular defects are noted.  No prominent edema is appreciated within  the adjacent posterior paravertebral musculature.  Evaluation of the  thecal sac is limited due to lack of myelographic contrast.  There are  enlarged lymph nodes seen within the visualized portions of the  mediastinum.  There are bilateral lower lobe infiltrates with tiny  bilateral effusions.  Coronary artery calcifications are present.  No compression deformities are visualized within the lumbar spine.  No  pars defects are noted.  No significant spondylolisthesis is seen.  No  prominent edema is appreciated within the visualized portions of the  psoas musculature.  A long dialysis catheter is visualized within the  inferior vena cava.  Evaluation of the thecal sac is limited due to  lack of myelographic contrast.  There is disc bulging seen from L3  through S1.  No significant impingement is appreciated upon the neural  foramina on the sagittal reconstructions.  Impression: Thoracic spine:  1.  Portions of T1 and T2 cough on this study.  2.  No acute osseous findings seen from T3 through T12.  3.  Tiny bilateral effusions with adjacent lower lobe infiltrates.  4.  The distal adenopathy.  5.  Coronary artery calcifications.  Lumbar spine:  1.  No compression deformities or spinal listhesis is noted.  2.  Disc bulging from L3 to S1.  Evaluation for spinal stenosis is  limited due to lack mammographic contrast.  3.  Incidental note of a long dialysis catheter within the inferior  vena cava.  Signed by Deejay Hoffman MD  CT thoracic spine wo IV contrast  Narrative: STUDY:  CT Thoracic Spine  and Lumbar Spine without IV Contrast; 4/30/2024  11:11 AM  INDICATION:  Midline back pain.  COMPARISON:  None Available.  ACCESSION NUMBER(S):  [id number], [id number]  ORDERING CLINICIAN:  RAJNI FARMER  TECHNIQUE:  CT of the thoracic spine and lumbar spine was performed  without intravenous or intrathecal contrast.  Sagittal and coronal  reconstructions were generated.    Automated mA/kV exposure control was utilized and patient examination  was performed in strict accordance with principles of ALARA.  FINDINGS:  Portions of the T1 vertebral body, as well as a small portion of the  anterior superior aspect of T2 is cut off on this study.  No  compression deformities are visualized within the thoracic vertebral  bodies from T2 through T12.  No spondylolisthesis is noted.  No  pedicular defects are noted.  No prominent edema is appreciated within  the adjacent posterior paravertebral musculature.  Evaluation of the  thecal sac is limited due to lack of myelographic contrast.  There are  enlarged lymph nodes seen within the visualized portions of the  mediastinum.  There are bilateral lower lobe infiltrates with tiny  bilateral effusions.  Coronary artery calcifications are present.  No compression deformities are visualized within the lumbar spine.  No  pars defects are noted.  No significant spondylolisthesis is seen.  No  prominent edema is appreciated within the visualized portions of the  psoas musculature.  A long dialysis catheter is visualized within the  inferior vena cava.  Evaluation of the thecal sac is limited due to  lack of myelographic contrast.  There is disc bulging seen from L3  through S1.  No significant impingement is appreciated upon the neural  foramina on the sagittal reconstructions.  Impression: Thoracic spine:  1.  Portions of T1 and T2 cough on this study.  2.  No acute osseous findings seen from T3 through T12.  3.  Tiny bilateral effusions with adjacent lower lobe infiltrates.  4.   The distal adenopathy.  5.  Coronary artery calcifications.  Lumbar spine:  1.  No compression deformities or spinal listhesis is noted.  2.  Disc bulging from L3 to S1.  Evaluation for spinal stenosis is  limited due to lack mammographic contrast.  3.  Incidental note of a long dialysis catheter within the inferior  vena cava.  Signed by Deejay Hoffman MD  Lower extremity venous duplex right  Preliminary Cardiology Report               [place] [address]    Tel [phone] and Fax [phone]             Preliminary Vascular Lab Report     [facility] US LOWER EXTREMITY VENOUS DUPLEX RIGHT       Patient Name:      XIOMARA TRUJILLO PHYLLIS Maya Physician:  [id number] Rayo Jauregui MD, RPVI  Study Date:        4/30/2024    Ordering Physician: [id number] DEEP PANIAGUA  MRN/PID:           [id number]     Technologist:       Megan Shay RVT  Accession#:        [id number] Technologist 2:  Date of Birth/Age: 1985    Encounter#:         [id number]  Gender:            M  Admission Status:  Emergency    Location Performed: [facility]       Diagnosis/ICD: Pain in right leg-M79.604  Procedure/CPT: 77499 Peripheral venous duplex scan for DVT Limited       **CRITICAL RESULT**  Critical Result: Acute DVT in the right leg.  Notification called to Deep Paniagua PA-C on 4/30/2024 at 10:52:21 AM by Megan Shay RDMS, RVT.     PRELIMINARY CONCLUSIONS:  Right Lower Venous: There is acute non-occlusive deep vein thrombosis visualized in the common femoral vein. There are chronic changes visualized in the popliteal vein. Enlarged lymph node noted in the right groin.  Left Lower Venous: There are chronic changes visualized in the common femoral vein.     Imaging & Doppler Findings:     Right                 Compressible      Thrombus              Flow  Distal External Iliac                     None  CFV                     Partial    Acute non-occlusive Spontaneous/Phasic  PFV                       Yes              None  FV Proximal               Yes             None         Spontaneous/Phasic  FV Mid                    Yes             None  FV Distal                 Yes             None  Popliteal                 Yes            Chronic       Spontaneous/Phasic  Peroneal                  Yes             None  PTV                       Yes             None       Left Compress Thrombus        Flow  CFV    Yes    Chronic  Spontaneous/Phasic    VASCULAR PRELIMINARY REPORT  completed by Megan Shay RVT on 4/30/2024 at 10:52:48 AM       ** Final **  XR pelvis 1-2 views  Narrative: STUDY:  Pelvis Radiographs; 4/30/2024 at 9:06 AM.  INDICATION:  Pain.  COMPARISON:  CT pelvis 7/17/2023.  ACCESSION NUMBER(S):  [id number]  ORDERING CLINICIAN:  RAJNI FARMER  TECHNIQUE:  One view(s) of the pelvis.  FINDINGS:    The pelvic ring is intact.  There is no acute fracture.  Severe  underlying osteopenia.  Impression: Slightly limited secondary to underlying osteopenia.  No acute osseous  abnormality.  Signed by Roe Ruiz MD      PHYSICAL EXAM  NEUROLOGICAL: No abnormal movements, no changes from before  HEENT: Head atraumatic, perrl,eomi, no oral lesions, no ear rash   NECK: Supple, no ln, jvp flat, thyroid palp  HEART: S1, S2, no added sound  LUNGS: dec a/e  EXTREMITIES: no edema  ABDOMEN: Soft, nontender, bowel sound positive, no organomegaly  SKIN: No change  EYES: No changes, perrl, eomi,   ENT: No change    JOINT: No change  Relevant Results               Assessment/Plan        This patient has a central line   Reason for the central line remaining today? Dialysis/Hemapheresis            Principal Problem:    SIRS (systemic inflammatory response syndrome) (Multi)    Bactermia possible 2/2 line   Esrd  T2dm  Anemia of renal origin  Pvd  Htn    PLAN  D/w renal, HD today and then removing line  D/w ID   D/w RN    Time 33m              Mauricio Estrada MD

## 2024-05-01 NOTE — CONSULTS
CONSULT: NEPHROLOGY SERVICE    REASON FOR CONSULT: ESKD  Admit Date: 4/30/2024  1:30 PM       HPI: Patient is a 39 y.o. male admitted 4/30/2024 with h/o ESKD on HD, exhausted vascular access using R femoral TDC, T2D, amputations, DVT, HTN, coming with back pain and missed dialysis. Pt reports having fever 2 days ago, blood culture +GPC, getting antibiotics. CXR without congestion   Mild SOB    Dialysis at [facility], Dr Licona, MWF, 4h, last session on 4/26    Past Medical History:   Diagnosis Date    Chronic kidney disease     Diabetes mellitus (Multi)     ESRD (end stage renal disease) (Multi)     Essential (primary) hypertension 05/26/2017    HTN (hypertension), benign    GERD (gastroesophageal reflux disease)     Hyperlipidemia     Hypothyroidism      Allergies: Cashew nut     Past Surgical History:   Procedure Laterality Date    ARM AMPUTATION AT ELBOW Right 05/2021    AV FISTULA PLACEMENT W/ PTFE      CT AORTA AND BILATERAL ILIOFEMORAL RUNOFF ANGIOGRAM W AND/OR WO IV CONTRAST  02/09/2023    CT AORTA AND BILATERAL ILIOFEMORAL RUNOFF ANGIOGRAM W AND/OR WO IV CONTRAST 2/9/2023 DOCTOR OFFICE [facility]    IR CVC EXCHANGE  11/13/2023    IR CVC EXCHANGE 11/13/2023 [facility]    IR CVC PICC  10/19/2023    IR CVC PICC    IR CVC PICC  2/28/2022    IR CVC PICC    LEG AMPUTATION THROUGH KNEE Left 07/08/2023    LEG AMPUTATION THROUGH LOWER TIBIA AND FIBULA Left 07/05/2023    TOE AMPUTATION Left 02/17/2023    left 4th    WOUND DEBRIDEMENT Left 07/26/2023    leg    WOUND DEBRIDEMENT Left 08/02/2023    multiple leg debridements       Family History   Problem Relation Name Age of Onset    Other (DIABETES DUE TO UNDERLYING CONDITION WITH MICROALBUMINURIA) Father      Other (DIABETES DUE TO UNDERLYING CONDITION WITH MICROALBUMINURIA [Other]) Other AUNT     Other (DIABETES DUE TO UNDERLYING CONDITION WITH MICROALBUMINURIA [Other]) Other GP        Social History  He reports that he has been smoking cigarettes. He has a 3.8 pack-year  smoking history. He has never used smokeless tobacco. No history on file for alcohol use and drug use.    Review of Systems  As above     CURRENT HOSP MEDS:    Current Facility-Administered Medications:     apixaban (Eliquis) tablet 5 mg, 5 mg, oral, BID, Mauricio Estrada MD, 5 mg at 05/01/24 0954    atorvastatin (Lipitor) tablet 40 mg, 40 mg, oral, Nightly, Mauricio Estrada MD    B complex-vitamin C-folic acid (Nephrocaps) capsule 1 capsule, 1 capsule, oral, Daily, Mauricio Estrada MD, 1 capsule at 05/01/24 0900    dextrose 50 % injection 12.5 g, 12.5 g, intravenous, q15 min PRN, Mauricio Estrada MD    dextrose 50 % injection 25 g, 25 g, intravenous, q15 min PRN, Mauricio Estrada MD    glucagon (Glucagen) injection 1 mg, 1 mg, intramuscular, q15 min PRN, Mauricio Estrada MD    glucagon (Glucagen) injection 1 mg, 1 mg, intramuscular, q15 min PRN, Mauricio Estrada MD    insulin lispro (HumaLOG) injection 0-5 Units, 0-5 Units, subcutaneous, TID with meals, Mauricio Estrada MD, 5 Units at 05/01/24 0955    levothyroxine (Synthroid, Levoxyl) tablet 125 mcg, 125 mcg, oral, Daily before breakfast, Mauricio Estrada MD, 125 mcg at 05/01/24 0630    loperamide (Imodium) capsule 2 mg, 2 mg, oral, q4h PRN, Mauricio Estrada MD    melatonin tablet 3 mg, 3 mg, oral, Nightly PRN, Mauricio Estrada MD    nitroglycerin (Nitrostat) SL tablet 0.4 mg, 0.4 mg, sublingual, q5 min PRN, Mauricio Estrada MD    ondansetron (Zofran) injection 4 mg, 4 mg, intravenous, q6h PRN, Mauricio Estrada MD    oxyCODONE (Roxicodone) immediate release tablet 5 mg, 5 mg, oral, q4h PRN, Mauricio Estrada MD, 5 mg at 05/01/24 0520    piperacillin-tazobactam-dextrose (Zosyn) IV 2.25 g, 2.25 g, intravenous, q8h, Dori Flores, PharmD    polyethylene glycol (Glycolax, Miralax) packet 17 g, 17 g, oral, Daily, Mauricio Estrada MD    sennosides (Senokot) tablet 8.6 mg, 8.6 mg, oral, BID, Mauricio Estrada MD    sevelamer carbonate (Renvela) tablet 2,400 mg, 2,400 mg, oral, TID  "with meals, Mauricio Estrada MD    simethicone (Mylicon) chewable tablet 80 mg, 80 mg, oral, q8h PRN, Mauricio Estrada MD     PHYSICAL EXAM:  BP 98/59 (BP Location: Left arm, Patient Position: Lying)   Pulse (!) 114   Temp 36.3 °C (97.3 °F) (Oral)   Resp 18   Ht 1.753 m (5' 9\")   Wt 65.8 kg (145 lb)   SpO2 99%   BMI 21.41 kg/m²     Intake/Output Summary (Last 24 hours) at 5/1/2024 1129  Last data filed at 4/30/2024 1441  Gross per 24 hour   Intake 50 ml   Output --   Net 50 ml     Gen: AAO, NAD  Neck: No JVD  Cardiac: RRR  Resp: decrease BS  Abd: Soft, non tender, +BS, non distended   Ext: No edema   RUE amputation, L BKA  Access: R femoral TDC  Neuro: moves 4 ext  Peripheral Pulses: weak peripheral pulses.  Skin: Skin color, texture, turgor normal, no suspicious rashes or lesions.    LABS:   Results for orders placed or performed during the hospital encounter of 04/30/24 (from the past 24 hour(s))   CBC and Auto Differential   Result Value Ref Range    WBC 10.6 4.4 - 11.3 x10*3/uL    nRBC 0.0 0.0 - 0.0 /100 WBCs    RBC 4.00 (L) 4.50 - 5.90 x10*6/uL    Hemoglobin 11.2 (L) 13.5 - 17.5 g/dL    Hematocrit 34.7 (L) 41.0 - 52.0 %    MCV 87 80 - 100 fL    MCH 28.0 26.0 - 34.0 pg    MCHC 32.3 32.0 - 36.0 g/dL    RDW 18.5 (H) 11.5 - 14.5 %    Platelets 101 (L) 150 - 450 x10*3/uL    Neutrophils % 83.2 40.0 - 80.0 %    Immature Granulocytes %, Automated 0.8 0.0 - 0.9 %    Lymphocytes % 7.1 13.0 - 44.0 %    Monocytes % 8.2 2.0 - 10.0 %    Eosinophils % 0.1 0.0 - 6.0 %    Basophils % 0.6 0.0 - 2.0 %    Neutrophils Absolute 8.84 (H) 1.20 - 7.70 x10*3/uL    Immature Granulocytes Absolute, Automated 0.09 0.00 - 0.70 x10*3/uL    Lymphocytes Absolute 0.75 (L) 1.20 - 4.80 x10*3/uL    Monocytes Absolute 0.87 0.10 - 1.00 x10*3/uL    Eosinophils Absolute 0.01 0.00 - 0.70 x10*3/uL    Basophils Absolute 0.06 0.00 - 0.10 x10*3/uL   Comprehensive metabolic panel   Result Value Ref Range    Glucose 241 (H) 74 - 99 mg/dL    Sodium 132 " (L) 136 - 145 mmol/L    Potassium 4.7 3.5 - 5.3 mmol/L    Chloride 87 (L) 98 - 107 mmol/L    Bicarbonate 16 (L) 21 - 32 mmol/L    Anion Gap 34 (H) 10 - 20 mmol/L    Urea Nitrogen 72 (H) 6 - 23 mg/dL    Creatinine 9.76 (H) 0.50 - 1.30 mg/dL    eGFR 6 (L) >60 mL/min/1.73m*2    Calcium 9.3 8.6 - 10.3 mg/dL    Albumin 3.4 3.4 - 5.0 g/dL    Alkaline Phosphatase 156 (H) 33 - 120 U/L    Total Protein 7.5 6.4 - 8.2 g/dL    AST 10 9 - 39 U/L    Bilirubin, Total 1.3 (H) 0.0 - 1.2 mg/dL    ALT 10 10 - 52 U/L   Magnesium   Result Value Ref Range    Magnesium 2.10 1.60 - 2.40 mg/dL   Influenza A, and B PCR   Result Value Ref Range    Flu A Result Not Detected Not Detected    Flu B Result Not Detected Not Detected   Sars-CoV-2 PCR   Result Value Ref Range    Coronavirus 2019, PCR Not Detected Not Detected   Lactate   Result Value Ref Range    Lactate 0.9 0.4 - 2.0 mmol/L   Blood Culture    Specimen: Peripheral Venipuncture; Blood culture   Result Value Ref Range    Blood Culture       Identification and susceptibility testing to follow    Gram Stain Gram positive cocci, clusters (AA)    Blood Culture    Specimen: Peripheral Venipuncture; Blood culture   Result Value Ref Range    Blood Culture Loaded on Instrument - Culture in progress    POCT GLUCOSE   Result Value Ref Range    POCT Glucose 359 (H) 74 - 99 mg/dL   POCT GLUCOSE   Result Value Ref Range    POCT Glucose 435 (H) 74 - 99 mg/dL       DATA:   Diagnostic tests reviewed for today's visit:    Labs and meds    ASSESSMENT AND PLAN:  - ESKD on HD: missed last dialysis   Will plan in doing dialysis today  Watching blood cultures, try to keep line if possible  BP: controlled  BMD: on sevelamer  Lytes and acid base: acceptable  Hb >10      Greatly appreciate the opportunity to assist in the care of this patient. Will continue to follow.     Signature: Edwar Arango MD  Division of Nephrology and Hypertension

## 2024-05-01 NOTE — H&P
HISTORY AND PHYSICAL EXAMINATION - INTERNAL MEDICINE    PATIENT NAME: Edwar Hemphill    MRN: [id number]  SERVICE DATE: 4/30/2024   SERVICE TIME:  8:40 PM    PRIMARY CARE PHYSICIAN:  Mauricio Estrada MD    ASSESSMENT & PLAN     39yr old man with back pain, fever --> r/o sepsis, line infection, id and renal consult     Esrd   Anemia of renal origin   T2dm  Gerd  Htn  Pvd  Right FA amp  left aka        SUBJECTIVE  CHIEF COMPLAINT:   back pain and fever     HISTORY OF PRESENT ILLNESS:  Mr. Hemphill is a 39 y.o. male who presents with back pain and found to have fever in er    PAST MEDICAL HISTORY:    Past Medical History:   Diagnosis Date    Chronic kidney disease     Diabetes mellitus (Multi)     ESRD (end stage renal disease) (Multi)     Essential (primary) hypertension 05/26/2017    HTN (hypertension), benign    GERD (gastroesophageal reflux disease)     Hyperlipidemia     Hypothyroidism        PAST SURGICAL HISTORY:    Past Surgical History:   Procedure Laterality Date    ARM AMPUTATION AT ELBOW Right 05/2021    AV FISTULA PLACEMENT W/ PTFE      CT AORTA AND BILATERAL ILIOFEMORAL RUNOFF ANGIOGRAM W AND/OR WO IV CONTRAST  02/09/2023    CT AORTA AND BILATERAL ILIOFEMORAL RUNOFF ANGIOGRAM W AND/OR WO IV CONTRAST 2/9/2023 DOCTOR OFFICE [facility]    IR CVC EXCHANGE  11/13/2023    IR CVC EXCHANGE 11/13/2023 [facility]    IR CVC PICC  10/19/2023    IR CVC PICC    IR CVC PICC  2/28/2022    IR CVC PICC    LEG AMPUTATION THROUGH KNEE Left 07/08/2023    LEG AMPUTATION THROUGH LOWER TIBIA AND FIBULA Left 07/05/2023    TOE AMPUTATION Left 02/17/2023    left 4th    WOUND DEBRIDEMENT Left 07/26/2023    leg    WOUND DEBRIDEMENT Left 08/02/2023    multiple leg debridements       FAMILY HISTORY:    Family History   Problem Relation Name Age of Onset    Other (DIABETES DUE TO UNDERLYING CONDITION WITH MICROALBUMINURIA) Father      Other (DIABETES DUE TO UNDERLYING CONDITION WITH MICROALBUMINURIA [Other]) Other AUNT     Other  "(DIABETES DUE TO UNDERLYING CONDITION WITH MICROALBUMINURIA [Other]) Other GP        SOCIAL HISTORY:  [unfilled]    MEDICATIONS:  (Not in a hospital admission)      ALLERGIES:  Allergies   Allergen Reactions    Cashew Nut Anaphylaxis and Swelling     cashews       COMPLETE REVIEW OF SYSTEMS:  Limited     PAIN ASSESSMENT: back pain  GENERAL: No weight loss, malaise or fevers.  HEENT: Negative for frequent or significant headaches, No changes in hearing or vision, no nose bleeds or other nasal problems  NECK: Negative for lumps, goiter, pain and significant neck swelling  RESPIRATORY: Negative for cough, hemoptysis, wheezing or shortness of breath  CARDIOVASCULAR: Negative for chest pain, leg swelling or palpitations.  GI: No nausea, vomiting, or diarrhea  : No history of dysuria, frequency or incontinence.  MUSCULOSKELETAL: Negative for joint pain or swelling, back pain or muscle pain.  SKIN: Negative for lesions, rash, and itching.  PSYCH: Negative for sleep disturbance, mood disorder and recent psychosocial stressors.  HEMATOLOGY/LYMPHOLOGY: Negative for prolonged bleeding, bruising easily or swollen nodes.  ENDOCRINE: Negative for cold or heat intolerance, polyuria, polydipsia and goiter.  NEURO: No history of headaches, syncope, paralysis, seizures or tremors      OBJECTIVE  PHYSICAL EXAM:  Patient Vitals for the past 24 hrs:   BP Temp Temp src Pulse Resp SpO2 Height Weight   04/30/24 2030 100/69 -- -- 98 19 -- -- --   04/30/24 1900 94/65 -- -- 93 20 97 % -- --   04/30/24 1838 102/67 36.6 °C (97.8 °F) Oral 94 17 98 % -- --   04/30/24 1815 -- -- -- 94 20 98 % -- --   04/30/24 1453 94/61 37.2 °C (98.9 °F) Oral (!) 109 18 99 % -- --   04/30/24 1202 115/79 37.8 °C (100.1 °F) Oral (!) 112 -- 99 % -- --   04/30/24 0825 123/83 37.2 °C (99 °F) -- (!) 120 16 97 % 1.753 m (5' 9\") 65.8 kg (145 lb)     Body mass index is 21.41 kg/m².  GENERAL: alert, no distress, cooperative- no visitors present at the bedside  SKIN: " Skin color, texture, turgor normal. No rashes or lesions.  HEAD/SINUSES: No significant findings. Head atraumatic and normocephalic   EYES: PERRLA and EOMI  EARS: External ears normal  NOSE: Nares normal. Septum midline.  OROPHARYNX: Lips, mucosa, and tongue normal. Teeth and gums normal. Tongue midline. Oropharynx normal.  NECK: no jugulovenous distention, no carotid bruits, carotid pulse normal contour, supple, thyroid is normal  BACK: No CVAT.  LUNGS: sym exp, no dullness to percussion, Lungs clear to auscultation. Good diaphragmatic excursion.   CARDIAC: normal S1 and S2; no rubs, murmurs, or gallops, s3 or s4.  PMI 5th ICS 1 cm lateral-  Chest - Symmetrical chest wall expansion  Lymphatic: Neck, axilla and groin neg  ABDOMEN: Abdomen soft, non-tender. BS normal. No masses or organomegaly. No hernia, No guarding, no rigidity   EXTREMITIES: Extremities normal. No deformities, clubbing or skin discoloration., No ulcers or edema  NEURO: Cranial nerves II-XII intact. Sensation intact and tone appears normal- TOMAS  PULSES:  radial, brachial, femoral, and pedal pulses palpable 2+  GENITALIA/RECTAL: Deferred  MS: DIP, PIP changes of DJD- no clubbing, no effusion     DATA:   Diagnostic tests reviewed for today's visit:    Most recent labs  Most recent imaging  t75  SIGNATURE: Mauricio Estrada MD  DATE: April 30, 2024  TIME: 8:40 PM

## 2024-05-01 NOTE — CARE PLAN
The patient's goals for the shift include  pt will remain safe and comfortable throughout the shift.    The clinical goals for the shift include pt will remain safe and stable throughout the shift.

## 2024-05-01 NOTE — PROGRESS NOTES
"Vancomycin Dosing by Pharmacy- INITIAL    Edwar Hemphill is a 39 y.o. year old male who Pharmacy has been consulted for vancomycin dosing for line infections. Based on the patient's indication and renal status this patient will be dosed based on a goal trough/random level of 15-20.     Renal function is currently stable. ERSD on HD - planned on HD today    Visit Vitals  BP 98/59 (BP Location: Left arm, Patient Position: Lying)   Pulse (!) 114   Temp 36.3 °C (97.3 °F) (Oral)   Resp 18        Lab Results   Component Value Date    CREATININE 9.76 (H) 04/30/2024    CREATININE 10.95 (H) 02/02/2024    CREATININE 6.81 (H) 01/22/2024    CREATININE 5.89 (H) 01/21/2024        Patient weight is No results found for: \"PTWEIGHT\"    No results found for: \"CULTURE\"     I/O last 3 completed shifts:  In: 50 (0.8 mL/kg) [IV Piggyback:50]  Out: - (0 mL/kg)   Weight: 65.8 kg   [unfilled]    Lab Results   Component Value Date    PATIENTTEMP 37.0 01/14/2024    PATIENTTEMP 37.0 07/09/2023    PATIENTTEMP 37.0 07/08/2023    PATIENTTEMP 37.0 07/08/2023          Assessment/Plan     Patient will be given a loading dose of 1000 mg on 4/30 .  Will initiate dose per level and redose after HD session with 500 mg IV once today  Follow-up level will be ordered on TBD before 2nd HD session unless clinically indicated sooner.  Will continue to monitor renal function daily while on vancomycin and order serum creatinine at least every 48 hours if not already ordered.  Follow for continued vancomycin needs, clinical response, and signs/symptoms of toxicity.       Martha Donis, PharmD       "

## 2024-05-01 NOTE — CONSULTS
INFECTIOUS DISEASE INPATIENT INITIAL CONSULTATION    Referred By: Mauricio Estrada    Reason For Consult: sepsis     HPI:  This is a 39 y.o. male with PMH of ESRD on HD via tunneled femoral HD catheter, left AKA, DM II, riht arm amputation who presented with low back pain radiating down the right leg.    He was feeling too sick and missed HD. Denies fever/chills. No chest pain, shortness of breath. No abd pain, diarrhea.    Tmax here 100.1. WBC is normal. 1/2 sets of blood cx with GPC clusters.      Allergies:  Cashew nut     Vitals (Last 24 Hours):  Heart Rate:  []   Temp:  [36.3 °C (97.3 °F)-37.8 °C (100.1 °F)]   Resp:  [17-20]   BP: ()/(59-79)   SpO2:  [97 %-99 %]      PHYSICAL EXAM:  Gen - NAD  Heart - RRR, no murmurs  Lungs - clear bilaterally, no wheezing  Abd - soft, no ttp, BS present  RLE - s/p AKA  RUE - s/p amputation  Groin - femoral HD catheter present  Skin - no rash    MEDS:    Current Facility-Administered Medications:     apixaban (Eliquis) tablet 5 mg, 5 mg, oral, BID, Mauricio Estrada MD, 5 mg at 04/30/24 2252    atorvastatin (Lipitor) tablet 40 mg, 40 mg, oral, Nightly, Mauricio Estrada MD    B complex-vitamin C-folic acid (Nephrocaps) capsule 1 capsule, 1 capsule, oral, Daily, Mauricio Estrada MD    dextrose 50 % injection 12.5 g, 12.5 g, intravenous, q15 min PRN, Mauricio Estrada MD    dextrose 50 % injection 25 g, 25 g, intravenous, q15 min PRN, Mauricio Estrada MD    glucagon (Glucagen) injection 1 mg, 1 mg, intramuscular, q15 min PRN, Mauricio Estrada MD    glucagon (Glucagen) injection 1 mg, 1 mg, intramuscular, q15 min PRN, Mauricio Estrada MD    insulin lispro (HumaLOG) injection 0-5 Units, 0-5 Units, subcutaneous, TID with meals, Mauricio Estrada MD    levothyroxine (Synthroid, Levoxyl) tablet 125 mcg, 125 mcg, oral, Daily before breakfast, Mauricio Estrada MD, 125 mcg at 05/01/24 0630    loperamide (Imodium) capsule 2 mg, 2 mg, oral, q4h PRN, Mauricio Estrada MD    melatonin tablet 3  mg, 3 mg, oral, Nightly PRN, Mauricio Estrada MD    nitroglycerin (Nitrostat) SL tablet 0.4 mg, 0.4 mg, sublingual, q5 min PRN, Mauricio Estrada MD    ondansetron (Zofran) injection 4 mg, 4 mg, intravenous, q6h PRN, Mauricio Estrada MD    oxyCODONE (Roxicodone) immediate release tablet 5 mg, 5 mg, oral, q4h PRN, Mauricio Estrada MD, 5 mg at 05/01/24 0520    piperacillin-tazobactam-dextrose (Zosyn) IV 2.25 g, 2.25 g, intravenous, q12h, Mauricio Estrada MD, Stopped at 05/01/24 0551    polyethylene glycol (Glycolax, Miralax) packet 17 g, 17 g, oral, Daily, Mauricio Estrada MD    sennosides (Senokot) tablet 8.6 mg, 8.6 mg, oral, BID, Mauricio Estrada MD    sevelamer carbonate (Renvela) tablet 2,400 mg, 2,400 mg, oral, TID with meals, Mauricio Estrada MD    simethicone (Mylicon) chewable tablet 80 mg, 80 mg, oral, q8h PRN, Mauricio Estrada MD     LABS:  Lab Results   Component Value Date    WBC 10.6 04/30/2024    HGB 11.2 (L) 04/30/2024    HCT 34.7 (L) 04/30/2024    MCV 87 04/30/2024     (L) 04/30/2024      Results from last 72 hours   Lab Units 04/30/24  1345   SODIUM mmol/L 132*   POTASSIUM mmol/L 4.7   CHLORIDE mmol/L 87*   CO2 mmol/L 16*   BUN mg/dL 72*   CREATININE mg/dL 9.76*   GLUCOSE mg/dL 241*   CALCIUM mg/dL 9.3   ANION GAP mmol/L 34*   EGFR mL/min/1.73m*2 6*     Results from last 72 hours   Lab Units 04/30/24  1345   ALK PHOS U/L 156*   BILIRUBIN TOTAL mg/dL 1.3*   PROTEIN TOTAL g/dL 7.5   ALT U/L 10   AST U/L 10   ALBUMIN g/dL 3.4     Estimated Creatinine Clearance: 9.4 mL/min (A) (by C-G formula based on SCr of 9.76 mg/dL (H)).    IMAGING:  CXR 4/30  Impression:     Patchy right basilar infiltrate or atelectasis with small right  pleural effusion.     CT T/C-Spine 4/30  Impression:     Thoracic spine:  1.  Portions of T1 and T2 cough on this study.  2.  No acute osseous findings seen from T3 through T12.  3.  Tiny bilateral effusions with adjacent lower lobe infiltrates.  4.  The distal adenopathy.  5.   Coronary artery calcifications.  Lumbar spine:  1.  No compression deformities or spinal listhesis is noted.  2.  Disc bulging from L3 to S1.  Evaluation for spinal stenosis is  limited due to lack mammographic contrast.  3.  Incidental note of a long dialysis catheter within the inferior  vena cava.       ASSESSMENT/PLAN:    Bacteremia due to GPC Clusters - 1/2 sets peripheral blood positive. Might be skin contamination. However has a femoral HD cath and hx of true bacteremia in the past multiple times.    Repeat blood cx x2 - 1 peripheral and 1 from HD cath. IV Vancomycin after this.     Awaiting speciation/sensis on blood isolate.    Will follow. Thanks! D/w Dr. Natalie Jauregui MD  [facility]  Office #[phone]

## 2024-05-01 NOTE — PROGRESS NOTES
05/01/24 0733   Discharge Planning   Living Arrangements Other (Comment)  ([facility])   Support Systems Other (Comment)   Assistance Needed ADL's dressing, bathing, has a motorized wheelchair   Type of Residence Skilled nursing facility   Do you have animals or pets at home? No   Who is requesting discharge planning? Patient   Home or Post Acute Services Post acute facilities (Rehab/SNF/etc)   Type of Post Acute Facility Services Long term care   Patient expects to be discharged to: [facility]   Does the patient need discharge transport arranged? Yes   RoundTrip coordination needed? Yes   Has discharge transport been arranged? No   Financial Resource Strain   How hard is it for you to pay for the very basics like food, housing, medical care, and heating? Not very   Housing Stability   In the last 12 months, was there a time when you were not able to pay the mortgage or rent on time? N   In the last 12 months, was there a time when you did not have a steady place to sleep or slept in a shelter (including now)? N   Transportation Needs   In the past 12 months, has lack of transportation kept you from medical appointments or from getting medications? no   In the past 12 months, has lack of transportation kept you from meetings, work, or from getting things needed for daily living? No   Patient Choice   Provider Choice list and CMS website (https://medicare.gov/care-compare#search) for post-acute Quality and Resource Measure Data were provided and reviewed with: Patient     I met with this patient at his bedside he is alertx3, he stated he has been staying at [facility], that he has been there for 2 years now, he gets HD M[facility]W-F at [facility], he uses a motorized wheelchair, he is planning on going back to [facility] on discharge, I will build a referral and send it over, I will continue to monitor for discharge planning.

## 2024-05-01 NOTE — NURSING NOTE
4 of 4 blood cultures positive. Dr. Estrada alerted over the phone of positive resulted. ID also alerted at this time.

## 2024-05-02 ENCOUNTER — APPOINTMENT (OUTPATIENT)
Dept: CARDIOLOGY | Facility: HOSPITAL | Age: 39
DRG: 314 | End: 2024-05-02
Payer: COMMERCIAL

## 2024-05-02 LAB
GLUCOSE BLD MANUAL STRIP-MCNC: 230 MG/DL (ref 74–99)
GLUCOSE BLD MANUAL STRIP-MCNC: 231 MG/DL (ref 74–99)
GLUCOSE BLD MANUAL STRIP-MCNC: 264 MG/DL (ref 74–99)
GLUCOSE BLD MANUAL STRIP-MCNC: 269 MG/DL (ref 74–99)

## 2024-05-02 PROCEDURE — 82947 ASSAY GLUCOSE BLOOD QUANT: CPT | Mod: 91

## 2024-05-02 PROCEDURE — 2500000002 HC RX 250 W HCPCS SELF ADMINISTERED DRUGS (ALT 637 FOR MEDICARE OP, ALT 636 FOR OP/ED): Performed by: SPECIALIST

## 2024-05-02 PROCEDURE — 99233 SBSQ HOSP IP/OBS HIGH 50: CPT | Performed by: INTERNAL MEDICINE

## 2024-05-02 PROCEDURE — 1100000001 HC PRIVATE ROOM DAILY

## 2024-05-02 PROCEDURE — 2500000004 HC RX 250 GENERAL PHARMACY W/ HCPCS (ALT 636 FOR OP/ED): Performed by: PHYSICIAN ASSISTANT

## 2024-05-02 PROCEDURE — 8010000001 HC DIALYSIS - HEMODIALYSIS PER DAY

## 2024-05-02 PROCEDURE — 2500000001 HC RX 250 WO HCPCS SELF ADMINISTERED DRUGS (ALT 637 FOR MEDICARE OP): Performed by: SPECIALIST

## 2024-05-02 PROCEDURE — 2500000004 HC RX 250 GENERAL PHARMACY W/ HCPCS (ALT 636 FOR OP/ED): Performed by: SPECIALIST

## 2024-05-02 PROCEDURE — 36589 REMOVAL TUNNELED CV CATH: CPT

## 2024-05-02 PROCEDURE — 2500000006 HC RX 250 W HCPCS SELF ADMINISTERED DRUGS (ALT 637 FOR ALL PAYERS): Performed by: SPECIALIST

## 2024-05-02 PROCEDURE — 2500000004 HC RX 250 GENERAL PHARMACY W/ HCPCS (ALT 636 FOR OP/ED): Performed by: INTERNAL MEDICINE

## 2024-05-02 RX ORDER — HEPARIN SODIUM 1000 [USP'U]/ML
3100 INJECTION, SOLUTION INTRAVENOUS; SUBCUTANEOUS
Status: DISCONTINUED | OUTPATIENT
Start: 2024-05-03 | End: 2024-05-02

## 2024-05-02 RX ORDER — HEPARIN SODIUM 1000 [USP'U]/ML
3100 INJECTION, SOLUTION INTRAVENOUS; SUBCUTANEOUS
Status: DISCONTINUED | OUTPATIENT
Start: 2024-05-02 | End: 2024-05-26 | Stop reason: HOSPADM

## 2024-05-02 RX ADMIN — HEPARIN SODIUM 3100 UNITS: 1000 INJECTION INTRAVENOUS; SUBCUTANEOUS at 02:48

## 2024-05-02 RX ADMIN — NEPHROCAP 1 CAPSULE: 1 CAP ORAL at 08:54

## 2024-05-02 RX ADMIN — SEVELAMER CARBONATE 2400 MG: 800 TABLET, FILM COATED ORAL at 11:50

## 2024-05-02 RX ADMIN — APIXABAN 5 MG: 5 TABLET, FILM COATED ORAL at 08:55

## 2024-05-02 RX ADMIN — INSULIN LISPRO 3 UNITS: 100 INJECTION, SOLUTION INTRAVENOUS; SUBCUTANEOUS at 08:56

## 2024-05-02 RX ADMIN — VANCOMYCIN HYDROCHLORIDE 500 MG: 500 INJECTION, SOLUTION INTRAVENOUS at 02:53

## 2024-05-02 RX ADMIN — POLYETHYLENE GLYCOL 3350 17 G: 17 POWDER, FOR SOLUTION ORAL at 06:46

## 2024-05-02 RX ADMIN — HEPARIN SODIUM 3100 UNITS: 1000 INJECTION INTRAVENOUS; SUBCUTANEOUS at 02:47

## 2024-05-02 RX ADMIN — ATORVASTATIN CALCIUM 40 MG: 40 TABLET, FILM COATED ORAL at 21:52

## 2024-05-02 RX ADMIN — APIXABAN 5 MG: 5 TABLET, FILM COATED ORAL at 21:52

## 2024-05-02 RX ADMIN — INSULIN LISPRO 2 UNITS: 100 INJECTION, SOLUTION INTRAVENOUS; SUBCUTANEOUS at 18:31

## 2024-05-02 RX ADMIN — INSULIN LISPRO 3 UNITS: 100 INJECTION, SOLUTION INTRAVENOUS; SUBCUTANEOUS at 13:02

## 2024-05-02 RX ADMIN — OXYCODONE HYDROCHLORIDE 5 MG: 5 TABLET ORAL at 21:55

## 2024-05-02 ASSESSMENT — PAIN SCALES - GENERAL
PAINLEVEL_OUTOF10: 0 - NO PAIN
PAINLEVEL_OUTOF10: 8
PAINLEVEL_OUTOF10: 6
PAINLEVEL_OUTOF10: 0 - NO PAIN
PAINLEVEL_OUTOF10: 0 - NO PAIN

## 2024-05-02 ASSESSMENT — COGNITIVE AND FUNCTIONAL STATUS - GENERAL
DAILY ACTIVITIY SCORE: 15
EATING MEALS: A LITTLE
TOILETING: A LOT
MOVING FROM LYING ON BACK TO SITTING ON SIDE OF FLAT BED WITH BEDRAILS: A LITTLE
STANDING UP FROM CHAIR USING ARMS: TOTAL
DRESSING REGULAR UPPER BODY CLOTHING: A LITTLE
CLIMB 3 TO 5 STEPS WITH RAILING: TOTAL
HELP NEEDED FOR BATHING: A LOT
WALKING IN HOSPITAL ROOM: TOTAL
MOVING TO AND FROM BED TO CHAIR: TOTAL
MOBILITY SCORE: 10
PERSONAL GROOMING: A LITTLE
TURNING FROM BACK TO SIDE WHILE IN FLAT BAD: A LITTLE
DRESSING REGULAR LOWER BODY CLOTHING: A LOT

## 2024-05-02 ASSESSMENT — PAIN - FUNCTIONAL ASSESSMENT
PAIN_FUNCTIONAL_ASSESSMENT: NO/DENIES PAIN
PAIN_FUNCTIONAL_ASSESSMENT: 0-10

## 2024-05-02 ASSESSMENT — PAIN DESCRIPTION - LOCATION: LOCATION: BACK

## 2024-05-02 ASSESSMENT — PAIN DESCRIPTION - ORIENTATION: ORIENTATION: LOWER

## 2024-05-02 NOTE — PROGRESS NOTES
Xiomara Hemphill is a 39 y.o. male on day 2 of admission presenting with SIRS (systemic inflammatory response syndrome) (Multi).      Subjective   No new issues        Objective     Last Recorded Vitals  BP 96/58   Pulse 82   Temp 36.1 °C (97 °F) (Oral)   Resp 14   Wt 65.8 kg (145 lb)   SpO2 99%   Intake/Output last 3 Shifts:    Intake/Output Summary (Last 24 hours) at 5/2/2024 1230  Last data filed at 5/2/2024 0323  Gross per 24 hour   Intake 2700 ml   Output 1400 ml   Net 1300 ml       Admission Weight  Weight: 65.8 kg (145 lb) (04/30/24 0825)    Daily Weight  04/30/24 : 65.8 kg (145 lb)    Image Results  Lower extremity venous duplex right               [place] [address]    Tel [phone] and Fax [phone]       Vascular Lab Report  VASC US LOWER EXTREMITY VENOUS DUPLEX RIGHT       Patient Name:      XIOMARA HEMPHILL         Reading Physician:  [id number] Rayo Jauregui MD, RPVI  Study Date:        4/30/2024            Ordering Physician: [id number] PANIAGUA  MRN/PID:           [id number]             Technologist:       Megan Shay RVT  Accession#:        [id number]         Technologist 2:  Date of Birth/Age: 1985 / 39 years Encounter#:         [id number]  Gender:            M  Admission Status:  Emergency            Location Performed: [facility]       Diagnosis/ICD: Pain in right leg-M79.604  CPT Codes:     14953 Peripheral venous duplex scan for DVT Limited       **CRITICAL RESULT**  Critical Result: Acute DVT in the right leg.  Notification called to Deep Paniagua PA-C on 4/30/2024 at 10:52:21 AM by VALE Ferro RDMST.     CONCLUSIONS:  Right Lower Venous: There is acute non-occlusive deep vein thrombosis visualized in the common femoral vein. There are chronic changes visualized in the popliteal vein. Enlarged lymph node noted in the right groin. Waveforms suggestive of more distal  obstruction/thrombus. May wish further means of imaging evaluation.  Left Lower Venous: There are chronic changes visualized in the common femoral vein.     Comparison:  Compared with study from 7/27/2023, New finding of acute non-occlusive thrombus in the right CFV.     Imaging & Doppler Findings:     Right                 Compressible      Thrombus          Flow  Distal External Iliac                     None  CFV                     Partial    Acute non-occlusive Continuous  PFV                       Yes             None  FV Proximal               Yes             None         Continuous  FV Mid                    Yes             None  FV Distal                 Yes             None  Popliteal                 Yes            Chronic       Continuous  Peroneal                  Yes             None  PTV                       Yes             None       Left Compress Thrombus        Flow  CFV    Yes    Chronic  Spontaneous/Phasic       [id number] Rayo Jauregui MD, RPVI  Electronically signed by [id number] Rayo Jauregui MD, RPVI on 5/1/2024 at 9:13:07 AM       ** Final **      PHYSICAL EXAM  NEUROLOGICAL: No abnormal movements, no changes from before  HEENT: Head atraumatic, perrl,eomi, no oral lesions, no ear rash   NECK: Supple, no ln, jvp flat, thyroid palp  HEART: S1, S2, no added sound  LUNGS: dec a/e  EXTREMITIES: no edema  ABDOMEN: Soft, nontender, bowel sound positive, no organomegaly  SKIN: No change  EYES: No changes, perrl, eomi,   ENT: No change    JOINT: No change  Relevant Results               Assessment/Plan          Principal Problem:    SIRS (systemic inflammatory response syndrome) (Multi)    Esrd  T2dm  Anemia of renal origin  Thrmbocytopenia  [place] aso  Pvd  Hypothyroid  Oa    PLAN  Now plan is to keep line  Antibiotics   Await repeat cultures    t33              Mauricio Estrada MD

## 2024-05-02 NOTE — CARE PLAN
The patient's goals for the shift include      The clinical goals for the shift include Pt will remain safe this shift.    Over the shift, the patient did meet this goal.

## 2024-05-02 NOTE — PROGRESS NOTES
.Report to Receiving RN:    Report To: HILLARY ROBERTSON   Time Report Called: [id number]  Hand-Off Communication: tx details, patient stable post HD.  Complications During Treatment: Yes, see flowsheet hypotension.  Ultrafiltration Treatment: No  Medications Administered During Dialysis: Yes, see MAR  Blood Products Administered During Dialysis: No  Labs Sent During Dialysis: No  Heparin Drip Rate Changes: No  Dialysis Catheter Dressing: prior to HD  Last Dressing Change: 5/1/2024    Electronic Signatures:  HOMERO Sneed    Last Updated: 2:49 AM by AVELINO FLORES

## 2024-05-02 NOTE — PROGRESS NOTES
05/02/24 1348   Discharge Planning   Patient expects to be discharged to: [facility]     Patient not med ready for discharge on IV vanc, + blood cx, will need repeat blood cx, MRI pending and TTE pending to r/o endocarditis, once patient is med clear will return to [facility] where he is LTC resident, I will continue to monitor for discharge planning.

## 2024-05-02 NOTE — PROGRESS NOTES
"  INFECTIOUS DISEASE DAILY PROGRESS NOTE    SUBJECTIVE:    No overnight events. No new complaints. Afebrile. No rash/itching/diarrhea. Still with back pain low spine, this is new as of  weeks ago.    OBJECTIVE:  VITALS (Last 24 Hours)  BP 96/58   Pulse 82   Temp 36.1 °C (97 °F) (Oral)   Resp 14   Ht 1.753 m (5' 9\")   Wt 65.8 kg (145 lb)   SpO2 99%   BMI 21.41 kg/m²     PHYSICAL EXAM:  Gen - NAD  Heart - RRR  Abd - soft, no ttp, BS present  RLE - s/p AKA  RUE - s/p amputation  Groin - femoral HD catheter present  Skin - no rash    ABX: IV Vancomycin    LABS:  Lab Results   Component Value Date    WBC 10.6 04/30/2024    HGB 11.2 (L) 04/30/2024    HCT 34.7 (L) 04/30/2024    MCV 87 04/30/2024     (L) 04/30/2024     Lab Results   Component Value Date    GLUCOSE 241 (H) 04/30/2024    CALCIUM 9.3 04/30/2024     (L) 04/30/2024    K 4.7 04/30/2024    CO2 16 (L) 04/30/2024    CL 87 (L) 04/30/2024    BUN 72 (H) 04/30/2024    CREATININE 9.76 (H) 04/30/2024     Results from last 72 hours   Lab Units 04/30/24  1345   ALK PHOS U/L 156*   BILIRUBIN TOTAL mg/dL 1.3*   PROTEIN TOTAL g/dL 7.5   ALT U/L 10   AST U/L 10   ALBUMIN g/dL 3.4     Estimated Creatinine Clearance: 9.4 mL/min (A) (by C-G formula based on SCr of 9.76 mg/dL (H)).    ASSESSMENT/PLAN:     CLABSI (HD cath POA) due to Staph aureus - true bacteremia  Lumbar Spine Pain - CT without epidural abscess. Will check MRI to make sure no OM/discitis which will affect length of abx therapy needed.    IV Vancomycin still. Repeat blood cx pending but are likely to be positive.    Will ask IR to evaluate for line exchange. Line holiday is preferred but likely not possible as he has access issues and removing his HD cath may result in loss of access.    TTE for endocarditis evaluation. Will determine need for POLI depending on rest of clinical progression.    Monitoring for adverse effects of abx such as rash/itching/diarrhea.    Will follow. Thanks!    Mat " MD Juventino  [facility]  Office #[phone]

## 2024-05-02 NOTE — PROGRESS NOTES
.Report from Sending RN:    Report From: Penny ROBERTSON RN  Recent Surgery of Procedure: No  Baseline Level of Consciousness (LOC): alert and orient x 2-3  Oxygen Use: No  Type: n/a  Diabetic: Yes  Last BP Med Given Day of Dialysis: see MAR  Last Pain Med Given: see MAR  Lab Tests to be Obtained with Dialysis: No  Blood Transfusion to be Given During Dialysis: No  Available IV Access: Yes, see MAR  Medications to be Administered During Dialysis: No  Continuous IV Infusion Running: No  Restraints on Currently or in the Last 24 Hours: No  Hand-Off Communication: at bedside. Patient has been refusing to get into bed. Finally agreed to get in bed for HD.  Dialysis Catheter Dressing: now prior to treatment  Last Dressing Change: at chronic unit.

## 2024-05-02 NOTE — CONSULTS
Consults    Reason For Consult  TDC exchange    History Of Present Illness  Edwar Hemphill is a 39 y.o. male presenting with back pain and low grade fever. He has a history of ESRD, on HD via right femoral HD line (most recent exchange was 1/22/24 with Dr. Dempsey), PAD s/p left AKA, right forearm amputation s/p infected dialysis access. He is currently being evaluated for renal transplant. WBC normal. Tmax 100. Denies chills overnight. Blood cultures drawn 4/30 and 5/1, both + GPC in clusters. IR consulted for line exchange in this patient with limited vascular access.     Past Medical History  He has a past medical history of Chronic kidney disease, Diabetes mellitus (Multi), ESRD (end stage renal disease) (Multi), Essential (primary) hypertension (05/26/2017), GERD (gastroesophageal reflux disease), Hyperlipidemia, and Hypothyroidism.    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     Social History  He reports that he has been smoking cigarettes. He has a 3.8 pack-year smoking history. He has never used smokeless tobacco. No history on file for alcohol use and drug use.    Family History  Family History   Problem Relation Name Age of Onset    Other (DIABETES DUE TO UNDERLYING CONDITION WITH MICROALBUMINURIA) Father      Other (DIABETES DUE TO UNDERLYING CONDITION WITH MICROALBUMINURIA [Other]) Other AUNT     Other (DIABETES DUE TO UNDERLYING CONDITION WITH MICROALBUMINURIA [Other]) Other GP         Allergies  Cashew nut    MEDS:    Current  Facility-Administered Medications:     apixaban (Eliquis) tablet 5 mg, 5 mg, oral, BID, Mauricio Estrada MD, 5 mg at 05/02/24 0855    atorvastatin (Lipitor) tablet 40 mg, 40 mg, oral, Nightly, Mauricio Estrada MD    B complex-vitamin C-folic acid (Nephrocaps) capsule 1 capsule, 1 capsule, oral, Daily, Mauricio Estrada MD, 1 capsule at 05/02/24 0854    dextrose 50 % injection 12.5 g, 12.5 g, intravenous, q15 min PRN, Mauricio Estrada MD    dextrose 50 % injection 25 g, 25 g, intravenous, q15 min PRN, Mauricio Estrada MD    glucagon (Glucagen) injection 1 mg, 1 mg, intramuscular, q15 min PRN, Mauricio Estrada MD    glucagon (Glucagen) injection 1 mg, 1 mg, intramuscular, q15 min PRN, Mauricio Estrada MD    heparin 1,000 unit/mL injection 3,100 Units, 3,100 Units, intra-catheter, After Dialysis, Michelle Drummond PA-C, 3,100 Units at 05/02/24 0248    heparin 1,000 unit/mL injection 3,100 Units, 3,100 Units, intra-catheter, After Dialysis, Michelle Drummond PA-C, 3,100 Units at 05/02/24 0247    insulin lispro (HumaLOG) injection 0-5 Units, 0-5 Units, subcutaneous, TID with meals, Mauricio Estrada MD, 3 Units at 05/02/24 1302    levothyroxine (Synthroid, Levoxyl) tablet 125 mcg, 125 mcg, oral, Daily before breakfast, Mauricio Estrada MD, 125 mcg at 05/01/24 0630    loperamide (Imodium) capsule 2 mg, 2 mg, oral, q4h PRN, Mauricio Estrada MD    melatonin tablet 3 mg, 3 mg, oral, Nightly PRN, Mauricio Estrada MD    nitroglycerin (Nitrostat) SL tablet 0.4 mg, 0.4 mg, sublingual, q5 min PRN, Mauricio Estrada MD    ondansetron (Zofran) injection 4 mg, 4 mg, intravenous, q6h PRN, Mauricio Estrada MD    oxyCODONE (Roxicodone) immediate release tablet 5 mg, 5 mg, oral, q4h PRN, Mauricio Estrada MD, 5 mg at 05/01/24 0520    polyethylene glycol (Glycolax, Miralax) packet 17 g, 17 g, oral, Daily, Mauricio Estrada MD, 17 g at 05/02/24 0646    sennosides (Senokot) tablet 8.6 mg, 8.6 mg, oral, BID, Mauricio Estrada MD    sevelamer carbonate (Renvela)  "tablet 2,400 mg, 2,400 mg, oral, TID with meals, Mauricio Estrada MD, 2,400 mg at 05/02/24 1150    simethicone (Mylicon) chewable tablet 80 mg, 80 mg, oral, q8h PRN, Mauricio Estrada MD    vancomycin (Vancocin) pharmacy to dose - pharmacy monitoring, , miscellaneous, Daily PRN, Mat Jauregui MD    Review of Systems   History obtained from chart review and the patient  General ROS: positive for  - malaise  Respiratory ROS: no cough, shortness of breath, or wheezing  Cardiovascular ROS: no chest pain or dyspnea on exertion  Gastrointestinal ROS: no abdominal pain, change in bowel habits, or black or bloody stools  Genitourinary ROS: no dysuria, trouble voiding, or hematuria     Last Recorded Vitals  BP 96/58   Pulse 82   Temp 36.1 °C (97 °F) (Oral)   Resp 14   Wt 65.8 kg (145 lb)   SpO2 99%      Physical Exam  Orientation: oriented to person, place, time, and general circumstances  HEENT: normocephalic, atraumatic  Pulm: normal  Cardiac: Regular rate and rhythm or +systolic murmur  GI: soft, nontender, normal bowel sounds  Pulses: peripheral pulses symmetrical and LUE, RLE.     Relevant Results    LABS:  Lab Results   Component Value Date    WBC 10.6 04/30/2024    HGB 11.2 (L) 04/30/2024    HCT 34.7 (L) 04/30/2024    MCV 87 04/30/2024     (L) 04/30/2024      Results from last 72 hours   Lab Units 04/30/24  1345   SODIUM mmol/L 132*   POTASSIUM mmol/L 4.7   CHLORIDE mmol/L 87*   CO2 mmol/L 16*   BUN mg/dL 72*   CREATININE mg/dL 9.76*   GLUCOSE mg/dL 241*   CALCIUM mg/dL 9.3   ANION GAP mmol/L 34*   EGFR mL/min/1.73m*2 6*     Results from last 72 hours   Lab Units 04/30/24  1345   ALK PHOS U/L 156*   BILIRUBIN TOTAL mg/dL 1.3*   PROTEIN TOTAL g/dL 7.5   ALT U/L 10   AST U/L 10   ALBUMIN g/dL 3.4           No lab exists for component: \"PT\"    MICRO:  Susceptibility data from last 14 days.  Collected Specimen Info Organism Clindamycin Erythromycin Oxacillin Tetracycline Trimethoprim/Sulfamethoxazole Vancomycin "   04/30/24 Blood culture from Peripheral Venipuncture Staphylococcus aureus         04/30/24 Blood culture from Peripheral Venipuncture Methicillin Resistant Staphylococcus aureus (MRSA) R R R R S S       IMAGING:   XR chest 1 view   Final Result   Patchy right basilar infiltrate or atelectasis with small right   pleural effusion.   Signed by Joseph Overton MD      CT thoracic spine wo IV contrast   Final Result   Thoracic spine:   1.  Portions of T1 and T2 cough on this study.   2.  No acute osseous findings seen from T3 through T12.   3.  Tiny bilateral effusions with adjacent lower lobe infiltrates.   4.  The distal adenopathy.   5.  Coronary artery calcifications.   Lumbar spine:   1.  No compression deformities or spinal listhesis is noted.   2.  Disc bulging from L3 to S1.  Evaluation for spinal stenosis is   limited due to lack mammographic contrast.   3.  Incidental note of a long dialysis catheter within the inferior   vena cava.   Signed by Deejay Hoffman MD      CT lumbar spine wo IV contrast   Final Result   Thoracic spine:   1.  Portions of T1 and T2 cough on this study.   2.  No acute osseous findings seen from T3 through T12.   3.  Tiny bilateral effusions with adjacent lower lobe infiltrates.   4.  The distal adenopathy.   5.  Coronary artery calcifications.   Lumbar spine:   1.  No compression deformities or spinal listhesis is noted.   2.  Disc bulging from L3 to S1.  Evaluation for spinal stenosis is   limited due to lack mammographic contrast.   3.  Incidental note of a long dialysis catheter within the inferior   vena cava.   Signed by Deejay Hoffman MD      Lower extremity venous duplex right   Final Result      XR pelvis 1-2 views   Final Result   Slightly limited secondary to underlying osteopenia.  No acute osseous   abnormality.   Signed by Roe Ruiz MD      Transthoracic Echo Complete    (Results Pending)   IR CVC exchange    (Results Pending)        Assessment/Plan     This is a 39 year  old male admitted for back pain and fever. Spine imaging relatively benign. WBC normal. Blood cultures + GPC. ID following, recommend TDC exchange.   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   Does not tolerate left groin access long term as it interferes with his prosthesis.   Will plan for Right fem TDC exchange today.     Risks were discussed including but not limited to pain at insertion site, infection, bleeding and hematoma, and air embolism. Benefits of the procedure and alternatives to treatment were also reviewed. Patient verbalizes understanding and wishes to proceed. They will further discuss with IR attending prior to procedure.     Continue NPO until post procedure.       Sedation Plan    ASA 3     Mallampati class: III.            Alix Gonzáles, AMARILYS-CNP    Time : Billing Time  Prep time on date of the patient encounter: 10 minutes.   Time spent directly with patient/family/caregiver: 20 minutes.   Documentation time: 10 minutes.   Total time (minutes):  40 minutes  Time Spent with this Patient (minutes).  More than 50% of This Time was Spent in Counseling and/or Coordination of Care

## 2024-05-03 ENCOUNTER — APPOINTMENT (OUTPATIENT)
Dept: DIALYSIS | Facility: HOSPITAL | Age: 39
End: 2024-05-03
Payer: COMMERCIAL

## 2024-05-03 ENCOUNTER — APPOINTMENT (OUTPATIENT)
Dept: CARDIOLOGY | Facility: HOSPITAL | Age: 39
DRG: 314 | End: 2024-05-03
Payer: COMMERCIAL

## 2024-05-03 ENCOUNTER — APPOINTMENT (OUTPATIENT)
Dept: RADIOLOGY | Facility: HOSPITAL | Age: 39
DRG: 314 | End: 2024-05-03
Payer: COMMERCIAL

## 2024-05-03 LAB
ALBUMIN SERPL BCP-MCNC: 3.1 G/DL (ref 3.4–5)
ANION GAP SERPL CALC-SCNC: 35 MMOL/L (ref 10–20)
BACTERIA BLD AEROBE CULT: ABNORMAL
BACTERIA BLD AEROBE CULT: ABNORMAL
BACTERIA BLD CULT: ABNORMAL
BACTERIA BLD CULT: ABNORMAL
BUN SERPL-MCNC: 25 MG/DL (ref 6–23)
CALCIUM SERPL-MCNC: 9.4 MG/DL (ref 8.6–10.3)
CHLORIDE SERPL-SCNC: 93 MMOL/L (ref 98–107)
CO2 SERPL-SCNC: 12 MMOL/L (ref 21–32)
CREAT SERPL-MCNC: 3.78 MG/DL (ref 0.5–1.3)
EGFRCR SERPLBLD CKD-EPI 2021: 20 ML/MIN/1.73M*2
ERYTHROCYTE [DISTWIDTH] IN BLOOD BY AUTOMATED COUNT: 18.5 % (ref 11.5–14.5)
GLUCOSE BLD MANUAL STRIP-MCNC: 200 MG/DL (ref 74–99)
GLUCOSE BLD MANUAL STRIP-MCNC: 245 MG/DL (ref 74–99)
GLUCOSE BLD MANUAL STRIP-MCNC: 285 MG/DL (ref 74–99)
GLUCOSE BLD MANUAL STRIP-MCNC: 300 MG/DL (ref 74–99)
GLUCOSE SERPL-MCNC: 243 MG/DL (ref 74–99)
GRAM STN SPEC: ABNORMAL
GRAM STN SPEC: ABNORMAL
HCT VFR BLD AUTO: 31.9 % (ref 41–52)
HGB BLD-MCNC: 10.6 G/DL (ref 13.5–17.5)
LEFT ATRIUM VOLUME AREA LENGTH INDEX BSA: 22.7 ML/M2
MCH RBC QN AUTO: 28.2 PG (ref 26–34)
MCHC RBC AUTO-ENTMCNC: 33.2 G/DL (ref 32–36)
MCV RBC AUTO: 85 FL (ref 80–100)
NRBC BLD-RTO: 0 /100 WBCS (ref 0–0)
PHOSPHATE SERPL-MCNC: 4 MG/DL (ref 2.5–4.9)
PLATELET # BLD AUTO: 130 X10*3/UL (ref 150–450)
POTASSIUM SERPL-SCNC: 4.5 MMOL/L (ref 3.5–5.3)
RBC # BLD AUTO: 3.76 X10*6/UL (ref 4.5–5.9)
SODIUM SERPL-SCNC: 135 MMOL/L (ref 136–145)
VANCOMYCIN SERPL-MCNC: 10.1 UG/ML (ref 5–20)
WBC # BLD AUTO: 9.9 X10*3/UL (ref 4.4–11.3)

## 2024-05-03 PROCEDURE — 2500000001 HC RX 250 WO HCPCS SELF ADMINISTERED DRUGS (ALT 637 FOR MEDICARE OP): Performed by: SPECIALIST

## 2024-05-03 PROCEDURE — 99152 MOD SED SAME PHYS/QHP 5/>YRS: CPT | Performed by: RADIOLOGY

## 2024-05-03 PROCEDURE — 85027 COMPLETE CBC AUTOMATED: CPT | Performed by: INTERNAL MEDICINE

## 2024-05-03 PROCEDURE — C1769 GUIDE WIRE: HCPCS | Performed by: RADIOLOGY

## 2024-05-03 PROCEDURE — 0J2TXYZ CHANGE OTHER DEVICE IN TRUNK SUBCUTANEOUS TISSUE AND FASCIA, EXTERNAL APPROACH: ICD-10-PCS | Performed by: RADIOLOGY

## 2024-05-03 PROCEDURE — 82947 ASSAY GLUCOSE BLOOD QUANT: CPT

## 2024-05-03 PROCEDURE — 72148 MRI LUMBAR SPINE W/O DYE: CPT | Performed by: RADIOLOGY

## 2024-05-03 PROCEDURE — 80202 ASSAY OF VANCOMYCIN: CPT | Performed by: PHARMACIST

## 2024-05-03 PROCEDURE — 1100000001 HC PRIVATE ROOM DAILY

## 2024-05-03 PROCEDURE — C1750 CATH, HEMODIALYSIS,LONG-TERM: HCPCS | Performed by: RADIOLOGY

## 2024-05-03 PROCEDURE — 36581 REPLACE TUNNELED CV CATH: CPT | Performed by: RADIOLOGY

## 2024-05-03 PROCEDURE — 2720000007 HC OR 272 NO HCPCS: Performed by: RADIOLOGY

## 2024-05-03 PROCEDURE — 2500000004 HC RX 250 GENERAL PHARMACY W/ HCPCS (ALT 636 FOR OP/ED): Performed by: RADIOLOGY

## 2024-05-03 PROCEDURE — 2500000004 HC RX 250 GENERAL PHARMACY W/ HCPCS (ALT 636 FOR OP/ED): Performed by: PHYSICIAN ASSISTANT

## 2024-05-03 PROCEDURE — 90935 HEMODIALYSIS ONE EVALUATION: CPT | Performed by: INTERNAL MEDICINE

## 2024-05-03 PROCEDURE — 8010000001 HC DIALYSIS - HEMODIALYSIS PER DAY

## 2024-05-03 PROCEDURE — 93306 TTE W/DOPPLER COMPLETE: CPT | Performed by: INTERNAL MEDICINE

## 2024-05-03 PROCEDURE — 2780000003 HC OR 278 NO HCPCS: Performed by: RADIOLOGY

## 2024-05-03 PROCEDURE — 72148 MRI LUMBAR SPINE W/O DYE: CPT

## 2024-05-03 PROCEDURE — 2500000004 HC RX 250 GENERAL PHARMACY W/ HCPCS (ALT 636 FOR OP/ED): Performed by: PHARMACIST

## 2024-05-03 PROCEDURE — 36415 COLL VENOUS BLD VENIPUNCTURE: CPT | Performed by: INTERNAL MEDICINE

## 2024-05-03 PROCEDURE — 2500000005 HC RX 250 GENERAL PHARMACY W/O HCPCS: Performed by: RADIOLOGY

## 2024-05-03 PROCEDURE — 77001 FLUOROGUIDE FOR VEIN DEVICE: CPT | Performed by: RADIOLOGY

## 2024-05-03 PROCEDURE — 80069 RENAL FUNCTION PANEL: CPT | Performed by: INTERNAL MEDICINE

## 2024-05-03 PROCEDURE — 82010 KETONE BODYS QUAN: CPT | Mod: AHULAB | Performed by: INTERNAL MEDICINE

## 2024-05-03 PROCEDURE — 93306 TTE W/DOPPLER COMPLETE: CPT

## 2024-05-03 RX ORDER — LIDOCAINE HYDROCHLORIDE 10 MG/ML
INJECTION, SOLUTION EPIDURAL; INFILTRATION; INTRACAUDAL; PERINEURAL AS NEEDED
Status: DISCONTINUED | OUTPATIENT
Start: 2024-05-03 | End: 2024-05-26 | Stop reason: HOSPADM

## 2024-05-03 RX ORDER — VANCOMYCIN HYDROCHLORIDE 1 G/200ML
1000 INJECTION, SOLUTION INTRAVENOUS ONCE
Qty: 200 ML | Refills: 0 | Status: COMPLETED | OUTPATIENT
Start: 2024-05-03 | End: 2024-05-03

## 2024-05-03 RX ORDER — MIDAZOLAM HYDROCHLORIDE 1 MG/ML
INJECTION, SOLUTION INTRAMUSCULAR; INTRAVENOUS AS NEEDED
Status: DISCONTINUED | OUTPATIENT
Start: 2024-05-03 | End: 2024-05-26 | Stop reason: HOSPADM

## 2024-05-03 RX ORDER — HEPARIN SODIUM 1000 [USP'U]/ML
INJECTION, SOLUTION INTRAVENOUS; SUBCUTANEOUS AS NEEDED
Status: DISCONTINUED | OUTPATIENT
Start: 2024-05-03 | End: 2024-05-26 | Stop reason: HOSPADM

## 2024-05-03 RX ORDER — FENTANYL CITRATE 50 UG/ML
INJECTION, SOLUTION INTRAMUSCULAR; INTRAVENOUS AS NEEDED
Status: DISCONTINUED | OUTPATIENT
Start: 2024-05-03 | End: 2024-05-26 | Stop reason: HOSPADM

## 2024-05-03 RX ADMIN — HEPARIN SODIUM 3100 UNITS: 1000 INJECTION INTRAVENOUS; SUBCUTANEOUS at 12:10

## 2024-05-03 RX ADMIN — OXYCODONE HYDROCHLORIDE 5 MG: 5 TABLET ORAL at 10:29

## 2024-05-03 RX ADMIN — FENTANYL CITRATE 50 MCG: 0.05 INJECTION, SOLUTION INTRAMUSCULAR; INTRAVENOUS at 18:16

## 2024-05-03 RX ADMIN — LIDOCAINE HYDROCHLORIDE 5 ML: 10 INJECTION, SOLUTION EPIDURAL; INFILTRATION; INTRACAUDAL; PERINEURAL at 18:16

## 2024-05-03 RX ADMIN — Medication 2 L/MIN: at 17:58

## 2024-05-03 RX ADMIN — APIXABAN 5 MG: 5 TABLET, FILM COATED ORAL at 21:56

## 2024-05-03 RX ADMIN — APIXABAN 5 MG: 5 TABLET, FILM COATED ORAL at 13:29

## 2024-05-03 RX ADMIN — MIDAZOLAM HYDROCHLORIDE 1 MG: 1 INJECTION, SOLUTION INTRAMUSCULAR; INTRAVENOUS at 18:16

## 2024-05-03 RX ADMIN — HEPARIN SODIUM 2400 UNITS: 1000 INJECTION INTRAVENOUS; SUBCUTANEOUS at 18:24

## 2024-05-03 RX ADMIN — ATORVASTATIN CALCIUM 40 MG: 40 TABLET, FILM COATED ORAL at 21:56

## 2024-05-03 RX ADMIN — SENNOSIDES 8.6 MG: 8.6 TABLET, FILM COATED ORAL at 21:56

## 2024-05-03 RX ADMIN — LEVOTHYROXINE SODIUM 125 MCG: 125 TABLET ORAL at 06:10

## 2024-05-03 RX ADMIN — VANCOMYCIN HYDROCHLORIDE 1000 MG: 1 INJECTION, SOLUTION INTRAVENOUS at 21:55

## 2024-05-03 RX ADMIN — HEPARIN SODIUM 2400 UNITS: 1000 INJECTION INTRAVENOUS; SUBCUTANEOUS at 18:23

## 2024-05-03 ASSESSMENT — PAIN SCALES - GENERAL
PAINLEVEL_OUTOF10: 0 - NO PAIN
PAINLEVEL_OUTOF10: 3
PAINLEVEL_OUTOF10: 8
PAINLEVEL_OUTOF10: 0 - NO PAIN

## 2024-05-03 ASSESSMENT — PAIN - FUNCTIONAL ASSESSMENT
PAIN_FUNCTIONAL_ASSESSMENT: 0-10
PAIN_FUNCTIONAL_ASSESSMENT: 0-10

## 2024-05-03 ASSESSMENT — PAIN DESCRIPTION - ORIENTATION: ORIENTATION: RIGHT

## 2024-05-03 ASSESSMENT — COGNITIVE AND FUNCTIONAL STATUS - GENERAL
HELP NEEDED FOR BATHING: A LOT
MOVING FROM LYING ON BACK TO SITTING ON SIDE OF FLAT BED WITH BEDRAILS: A LITTLE
CLIMB 3 TO 5 STEPS WITH RAILING: TOTAL
MOVING TO AND FROM BED TO CHAIR: TOTAL
STANDING UP FROM CHAIR USING ARMS: TOTAL
EATING MEALS: A LITTLE
DAILY ACTIVITIY SCORE: 15
TURNING FROM BACK TO SIDE WHILE IN FLAT BAD: A LITTLE
DRESSING REGULAR UPPER BODY CLOTHING: A LITTLE
TOILETING: A LOT
PERSONAL GROOMING: A LITTLE
DRESSING REGULAR LOWER BODY CLOTHING: A LOT
MOBILITY SCORE: 10
WALKING IN HOSPITAL ROOM: TOTAL

## 2024-05-03 ASSESSMENT — PAIN DESCRIPTION - LOCATION: LOCATION: LEG

## 2024-05-03 NOTE — PROCEDURES
"Hemodialysis Note    Patient seen and examined while on dialysis, recent events, labs, medications reviewed.   Tolerating HD well, 3.5h/1L UF  C/o back pain  BP 94/54 (BP Location: Left arm, Patient Position: Sitting)   Pulse 102   Temp 36 °C (96.8 °F) (Tympanic)   Resp 14   Ht 1.753 m (5' 9\")   Wt 65.8 kg (145 lb)   SpO2 96%   BMI 21.41 kg/m²    Hb 11.2  S/o R femoral TDC exchange    Next planned HD Monday     Will continue to follow, overall management per primary team, and continue regular dialysis.      Edwar Arango MD  Division of Nephrology and Hypertension    "

## 2024-05-03 NOTE — PROGRESS NOTES
Report to Receiving RN:    Report To: Mandi Hitchcock  Time Report Called: [id number]  Hand-Off Communication: Patient was dialyzed for 3H and 25mins, fluid removed just 100ml only due to low BP. Vitally stable post dialysis and aoX3.  Complications During Treatment: Yes  Ultrafiltration Treatment: No  Medications Administered During Dialysis: Yes  Blood Products Administered During Dialysis: No  Labs Sent During Dialysis: No  Heparin Drip Rate Changes: No  Dialysis Catheter Dressing:   Last Dressing Change:     Electronic Signatures:   (Signed )   Authored:    (Signed )   Authored:     Last Updated: 12:46 PM by KASANDRA VENTURA

## 2024-05-03 NOTE — PROGRESS NOTES
Report from Sending RN:    Report From: Mandi Hitchcock  Recent Surgery of Procedure: No  Baseline Level of Consciousness (LOC): AOx4  Oxygen Use: No  Type:   Diabetic: Yes  Last BP Med Given Day of Dialysis: see eMAR  Last Pain Med Given: see eMAR  Lab Tests to be Obtained with Dialysis: No  Blood Transfusion to be Given During Dialysis: No  Available IV Access: Yes  Medications to be Administered During Dialysis: No  Continuous IV Infusion Running: No  Restraints on Currently or in the Last 24 Hours: No  Hand-Off Communication: Patient was admitted due to back pain and missed dialysis. He is vitally stable, Aox4 and not in respir  Dialysis Catheter Dressing: ***  Last Dressing Change: ***

## 2024-05-03 NOTE — PROGRESS NOTES
Care Coordinator Note:    Plan: Patient in with back pain, fever. Plan for IR today for R fem TDC exchange. Patient had HD already today. Will also need echo- possibly need POLI. ID is following. IV vanco- blood cultures pending.     Status: inpatient  Payor: malinda bliss dual  Disposition: re: [facility] with HD MWF.   Barrier: Echo, ID clearance, exchange of HD line  ADOD: ? Weekend vs early next week.     Era Ram [facility]      05/03/24 1418   Discharge Planning   Patient expects to be discharged to: [facility]

## 2024-05-03 NOTE — PROGRESS NOTES
Xiomara Hemphill is a 39 y.o. male on day 3 of admission presenting with SIRS (systemic inflammatory response syndrome) (Multi).      Subjective   S/p line changed over guidewire       Objective     Last Recorded Vitals  /75   Pulse 102   Temp 37.2 °C (99 °F) (Oral)   Resp 16   Wt 65.8 kg (145 lb)   SpO2 97%   Intake/Output last 3 Shifts:    Intake/Output Summary (Last 24 hours) at 5/3/2024 1932  Last data filed at 5/3/2024 1822  Gross per 24 hour   Intake 400 ml   Output 100 ml   Net 300 ml       Admission Weight  Weight: 65.8 kg (145 lb) (04/30/24 0825)    Daily Weight  04/30/24 : 65.8 kg (145 lb)    Image Results  Transthoracic Echo Complete     [facility], [address] [place] [address]               Tel [phone] and Fax [phone]    TRANSTHORACIC ECHOCARDIOGRAM REPORT       Patient Name:      XIOMARA HEMPHILL         Reading Physician:    [id number] Brandon Nur DO  Study Date:        5/3/2024             Ordering Provider:    [id number] VIEYRA  MRN/PID:           [id number]             Fellow:  Accession#:        [id number]         Nurse:  Date of Birth/Age: 1985 / 39 years Sonographer:          Sy Lyons RDCS  Gender:            M                    Additional Staff:  Height:            175.00 cm            Admit Date:  Weight:            65.80 kg             Admission Status:     Inpatient -                                                                Routine  BSA / BMI:         1.80 m2 / 21.49      Encounter#:           [id number]                     kg/m2                                          Department Location:  [facility] Non                                                                Invasive  Blood Pressure: 112 /74 mmHg    Study Type:    TRANSTHORACIC ECHO (TTE) COMPLETE  Diagnosis/ICD: Endocarditis, valve unspecified-I38  Indication:    endocarditis  CPT Code:      Echo Complete w Full  [id number]    Patient History:  Pertinent History: HTN and Hyperlipidemia.    Study Detail: The following Echo studies were performed: M-Mode, Doppler, color                flow and 2D.       PHYSICIAN INTERPRETATION:  Left Ventricle: The left ventricular systolic function is moderately decreased, with an estimated ejection fraction of 35-40%. There are no regional wall motion abnormalities. The left ventricular cavity size is normal. Abnormal (paradoxical) septal motion, consistent with left bundle branch block. Spectral Doppler shows an impaired relaxation pattern of left ventricular diastolic filling.  Left Atrium: The left atrium is normal in size.  Right Ventricle: The right ventricle is normal in size. There is normal right ventricular global systolic function.  Right Atrium: The right atrium is normal in size.  Aortic Valve: The aortic valve is probably trileaflet. There is no evidence of aortic valve regurgitation.  Mitral Valve: The mitral valve is mildly thickened. There is trace mitral valve regurgitation.  Tricuspid Valve: The tricuspid valve is structurally normal. No evidence of tricuspid regurgitation.  Pulmonic Valve: The pulmonic valve is structurally normal. There is no indication of pulmonic valve regurgitation.  Pericardium: There is no pericardial effusion noted.  Aorta: The aortic root is normal.  In comparison to the previous echocardiogram(s): Compared with study from 1/16/2024, LV function has declined. Was previously normal.       CONCLUSIONS:   1. Left ventricular systolic function is moderately decreased with a 35-40% estimated ejection fraction.   2. Abnormal septal motion consistent with left bundle branch block.   3. Spectral Doppler shows an impaired relaxation pattern of left ventricular diastolic filling.   4. No vegetations visualized within the limitations of this study.   5. Compared with study from 1/16/2024, LV function has declined. Was previously normal.    QUANTITATIVE  DATA SUMMARY:  LA VOLUME:                                Normal Ranges:  LA Vol A4C:        40.1 ml    (22+/-6mL/m2)  LA Vol A2C:        35.3 ml  LA Vol BP:         40.9 ml  LA Vol Index A4C:  22.3ml/m2  LA Vol Index A2C:  19.6 ml/m2  LA Vol Index BP:   22.7 ml/m2  LA Area A4C:       15.3 cm2  LA Area A2C:       13.2 cm2  LA Major Axis A4C: 5.0 cm  LA Major Axis A2C: 4.2 cm  LA Volume Index:   22.7 ml/m2       [id number] Brandon Boom DO  Electronically signed on 5/3/2024 at 4:24:04 PM       ** Final **      PHYSICAL EXAM  NEUROLOGICAL: No abnormal movements, no changes from before  HEENT: Head atraumatic, perrl,eomi, no oral lesions, no ear rash   NECK: Supple, no ln, jvp flat, thyroid palp  HEART: S1, S2, no added sound  LUNGS: dec a/e  EXTREMITIES: no edema  ABDOMEN: Soft, nontender, bowel sound positive, no organomegaly  SKIN: No change  EYES: No changes, perrl, eomi,   ENT: No change    JOINT: No change  Relevant Results               Assessment/Plan        Principal Problem:    SIRS (systemic inflammatory response syndrome) (Multi)    Esrd  T2dm  Anemia of renal origin   Coronary atherosclerosis  Hypothyroid  Oa    He just had his mri   I will check with id if pt could be dc     t32     D/w HILLARY Estrada MD

## 2024-05-03 NOTE — PROGRESS NOTES
"Vancomycin Dosing by Pharmacy- Initial    Edwar Hemphill is a 39 y.o. year old male who Pharmacy has been consulted for vancomycin dosing for line infections. Based on the patient's indication and renal status this patient is being dosed based on a goal trough/random level of 15-20.     Renal function can be described as ESRD on Hemodialysis    Renal function is at patient's baseline      Visit Vitals  /74 (BP Location: Right arm, Patient Position: Lying)   Pulse 76   Temp 36.4 °C (97.6 °F) (Oral)   Resp 16        Lab Results   Component Value Date    CREATININE 3.78 (H) 05/03/2024    CREATININE 9.76 (H) 04/30/2024    CREATININE 10.95 (H) 02/02/2024    CREATININE 6.81 (H) 01/22/2024        Patient weight is No results found for: \"PTWEIGHT\"    Lab Results   Component Value Date    VANCORANDOM 10.1 05/03/2024    VANCORANDOM 14.7 01/22/2024    VANCORANDOM 19.3 01/20/2024        I/O last 3 completed shifts:  In: 2700 (41.1 mL/kg) [I.V.:1800 (27.4 mL/kg); Other:800; IV Piggyback:100]  Out: 1400 (21.3 mL/kg) [Other:1400]  Weight: 65.8 kg     Lab Results   Component Value Date    PATIENTTEMP 37.0 01/14/2024    PATIENTTEMP 37.0 07/09/2023    PATIENTTEMP 37.0 07/08/2023    PATIENTTEMP 37.0 07/08/2023          Assessment/Plan    Patient has already been given a loading dose of 1000 mg and then 500mg after HD.  Will initiate vancomycin maintenance, a one time dose of 1000 mg. Random level came back at 10.1, but of note it was drawn post-HD today.   Follow-up level will be ordered on Monday 5/6 at AM labs unless clinically indicated sooner.  Will continue to monitor renal function daily while on vancomycin and order serum creatinine at least every 48 hours if not already ordered.  Follow for continued vancomycin needs, clinical response, and signs/symptoms of toxicity.     Thank you for allowing me to participate in the care of this patient.     Olamide Oliva, PharmD             "

## 2024-05-03 NOTE — PROGRESS NOTES
"  INFECTIOUS DISEASE DAILY PROGRESS NOTE    SUBJECTIVE:    Patient lethargic this morning, confused, tachypnic. Seems to be in DKA now and pH is 7. Discussed with Dr. De La Paz in ICU.    OBJECTIVE:  VITALS (Last 24 Hours)  BP 94/54 (BP Location: Left arm, Patient Position: Sitting)   Pulse 102   Temp 36 °C (96.8 °F) (Tympanic)   Resp 14   Ht 1.753 m (5' 9\")   Wt 65.8 kg (145 lb)   SpO2 96%   BMI 21.41 kg/m²     PHYSICAL EXAM:  Gen - lethargic  Abd - soft, no ttp, BS present  RLE - s/p AKA  RUE - s/p amputation  Groin - femoral HD catheter present  Skin - no rash    ABX: IV Vancomycin    LABS:  Lab Results   Component Value Date    WBC 10.6 04/30/2024    HGB 11.2 (L) 04/30/2024    HCT 34.7 (L) 04/30/2024    MCV 87 04/30/2024     (L) 04/30/2024     Lab Results   Component Value Date    GLUCOSE 241 (H) 04/30/2024    CALCIUM 9.3 04/30/2024     (L) 04/30/2024    K 4.7 04/30/2024    CO2 16 (L) 04/30/2024    CL 87 (L) 04/30/2024    BUN 72 (H) 04/30/2024    CREATININE 9.76 (H) 04/30/2024     Results from last 72 hours   Lab Units 04/30/24  1345   ALK PHOS U/L 156*   BILIRUBIN TOTAL mg/dL 1.3*   PROTEIN TOTAL g/dL 7.5   ALT U/L 10   AST U/L 10   ALBUMIN g/dL 3.4     Estimated Creatinine Clearance: 9.4 mL/min (A) (by C-G formula based on SCr of 9.76 mg/dL (H)).    ASSESSMENT/PLAN:     CLABSI (HD cath POA) due to MRSA - line exchanged. Not having a full line holiday because of risk of losing HD access. TTE without endocarditis.  Lumbar Spine Pain - CT without epidural abscess. MRI pending.  DM II with DKA    MRI pending. OK to hold off on POLI for now.    IV Vancomycin still. Repeating blood cx x2 tomorrow.    Monitoring for adverse effects of abx such as rash/itching/diarrhea - none apparent.    Will follow. Thanks!    Mat Jauregui MD  [facility]  Office #[phone]      "

## 2024-05-03 NOTE — POST-PROCEDURE NOTE
Interventional Radiology Brief Postprocedure Note    Attending: Ke    Assistant: None    Diagnosis: ESRD, bacteremia    Description of procedure: Guidewire exchange to R groin tunneled HD catheter     Anesthesia:  Local  1 mg Versed, 50 mcg Fentanyl    Complications: None    Estimated Blood Loss: minimal        See detailed result report with images in PACS.    The patient tolerated the procedure well without incident or complication and is in stable condition.

## 2024-05-03 NOTE — CARE PLAN
The patient's goals for the shift include  pt will remain HDS throughout the shift    The clinical goals for the shift include Pt will remain safe this shift.    Over the shift, the patient did make progress toward the following goals. Barriers to progression include . Recommendations to address these barriers include .

## 2024-05-04 ENCOUNTER — APPOINTMENT (OUTPATIENT)
Dept: CARDIOLOGY | Facility: HOSPITAL | Age: 39
DRG: 314 | End: 2024-05-04
Payer: COMMERCIAL

## 2024-05-04 LAB
ALBUMIN SERPL BCP-MCNC: 3.3 G/DL (ref 3.4–5)
ANION GAP BLDA CALCULATED.4IONS-SCNC: 44 MMO/L (ref 10–25)
ANION GAP SERPL CALC-SCNC: 35 MMOL/L (ref 10–20)
B-OH-BUTYR SERPL-SCNC: 9.61 MMOL/L (ref 0.02–0.27)
BACTERIA BLD AEROBE CULT: ABNORMAL
BACTERIA BLD CULT: ABNORMAL
BASE EXCESS BLDA CALC-SCNC: -26 MMOL/L (ref -2–3)
BODY TEMPERATURE: 37 DEGREES CELSIUS
BUN SERPL-MCNC: 50 MG/DL (ref 6–23)
CA-I BLDA-SCNC: 1.3 MMOL/L (ref 1.1–1.33)
CALCIUM SERPL-MCNC: 9 MG/DL (ref 8.6–10.3)
CHLORIDE BLDA-SCNC: 90 MMOL/L (ref 98–107)
CHLORIDE SERPL-SCNC: 93 MMOL/L (ref 98–107)
CO2 SERPL-SCNC: 14 MMOL/L (ref 21–32)
CREAT SERPL-MCNC: 5.32 MG/DL (ref 0.5–1.3)
EGFRCR SERPLBLD CKD-EPI 2021: 13 ML/MIN/1.73M*2
ERYTHROCYTE [DISTWIDTH] IN BLOOD BY AUTOMATED COUNT: 19 % (ref 11.5–14.5)
GLUCOSE BLD MANUAL STRIP-MCNC: 103 MG/DL (ref 74–99)
GLUCOSE BLD MANUAL STRIP-MCNC: 113 MG/DL (ref 74–99)
GLUCOSE BLD MANUAL STRIP-MCNC: 121 MG/DL (ref 74–99)
GLUCOSE BLD MANUAL STRIP-MCNC: 138 MG/DL (ref 74–99)
GLUCOSE BLD MANUAL STRIP-MCNC: 158 MG/DL (ref 74–99)
GLUCOSE BLD MANUAL STRIP-MCNC: 175 MG/DL (ref 74–99)
GLUCOSE BLD MANUAL STRIP-MCNC: 190 MG/DL (ref 74–99)
GLUCOSE BLD MANUAL STRIP-MCNC: 242 MG/DL (ref 74–99)
GLUCOSE BLD MANUAL STRIP-MCNC: 269 MG/DL (ref 74–99)
GLUCOSE BLD MANUAL STRIP-MCNC: 281 MG/DL (ref 74–99)
GLUCOSE BLD MANUAL STRIP-MCNC: 382 MG/DL (ref 74–99)
GLUCOSE BLD MANUAL STRIP-MCNC: 385 MG/DL (ref 74–99)
GLUCOSE BLD MANUAL STRIP-MCNC: 390 MG/DL (ref 74–99)
GLUCOSE BLD MANUAL STRIP-MCNC: 391 MG/DL (ref 74–99)
GLUCOSE BLD MANUAL STRIP-MCNC: 412 MG/DL (ref 74–99)
GLUCOSE BLD MANUAL STRIP-MCNC: 87 MG/DL (ref 74–99)
GLUCOSE BLDA-MCNC: 535 MG/DL (ref 74–99)
GLUCOSE SERPL-MCNC: 174 MG/DL (ref 74–99)
GRAM STN SPEC: ABNORMAL
GRAM STN SPEC: ABNORMAL
HCO3 BLDA-SCNC: 2 MMOL/L (ref 22–26)
HCT VFR BLD AUTO: 34.7 % (ref 41–52)
HCT VFR BLD EST: 33 % (ref 41–52)
HGB BLD-MCNC: 11.1 G/DL (ref 13.5–17.5)
HGB BLDA-MCNC: 11.1 G/DL (ref 13.5–17.5)
INHALED O2 CONCENTRATION: 21 %
LACTATE BLDA-SCNC: 2.5 MMOL/L (ref 0.4–2)
LACTATE BLDV-SCNC: 3.7 MMOL/L (ref 0.4–2)
MAGNESIUM SERPL-MCNC: 2.1 MG/DL (ref 1.6–2.4)
MCH RBC QN AUTO: 28.2 PG (ref 26–34)
MCHC RBC AUTO-ENTMCNC: 32 G/DL (ref 32–36)
MCV RBC AUTO: 88 FL (ref 80–100)
NRBC BLD-RTO: 0 /100 WBCS (ref 0–0)
OXYHGB MFR BLDA: 95.4 % (ref 94–98)
PCO2 BLDA: 7 MM HG (ref 38–42)
PH BLDA: 7.06 PH (ref 7.38–7.42)
PHOSPHATE SERPL-MCNC: 4.7 MG/DL (ref 2.5–4.9)
PLATELET # BLD AUTO: 212 X10*3/UL (ref 150–450)
PO2 BLDA: 102 MM HG (ref 85–95)
POTASSIUM BLDA-SCNC: 4.4 MMOL/L (ref 3.5–5.3)
POTASSIUM SERPL-SCNC: 3.6 MMOL/L (ref 3.5–5.3)
RBC # BLD AUTO: 3.93 X10*6/UL (ref 4.5–5.9)
SAO2 % BLDA: 98 % (ref 94–100)
SODIUM BLDA-SCNC: 132 MMOL/L (ref 136–145)
SODIUM SERPL-SCNC: 138 MMOL/L (ref 136–145)
VANCOMYCIN SERPL-MCNC: 25.3 UG/ML (ref 5–20)
WBC # BLD AUTO: 19.3 X10*3/UL (ref 4.4–11.3)

## 2024-05-04 PROCEDURE — 93005 ELECTROCARDIOGRAM TRACING: CPT

## 2024-05-04 PROCEDURE — 84132 ASSAY OF SERUM POTASSIUM: CPT | Mod: 91 | Performed by: SURGERY

## 2024-05-04 PROCEDURE — 99292 CRITICAL CARE ADDL 30 MIN: CPT | Performed by: SURGERY

## 2024-05-04 PROCEDURE — 2500000001 HC RX 250 WO HCPCS SELF ADMINISTERED DRUGS (ALT 637 FOR MEDICARE OP): Performed by: SPECIALIST

## 2024-05-04 PROCEDURE — 99291 CRITICAL CARE FIRST HOUR: CPT | Performed by: SURGERY

## 2024-05-04 PROCEDURE — 2500000004 HC RX 250 GENERAL PHARMACY W/ HCPCS (ALT 636 FOR OP/ED)

## 2024-05-04 PROCEDURE — 2500000002 HC RX 250 W HCPCS SELF ADMINISTERED DRUGS (ALT 637 FOR MEDICARE OP, ALT 636 FOR OP/ED)

## 2024-05-04 PROCEDURE — 2500000005 HC RX 250 GENERAL PHARMACY W/O HCPCS: Performed by: SURGERY

## 2024-05-04 PROCEDURE — 36415 COLL VENOUS BLD VENIPUNCTURE: CPT

## 2024-05-04 PROCEDURE — 36600 WITHDRAWAL OF ARTERIAL BLOOD: CPT

## 2024-05-04 PROCEDURE — 83605 ASSAY OF LACTIC ACID: CPT | Performed by: SPECIALIST

## 2024-05-04 PROCEDURE — 80202 ASSAY OF VANCOMYCIN: CPT

## 2024-05-04 PROCEDURE — 2500000005 HC RX 250 GENERAL PHARMACY W/O HCPCS

## 2024-05-04 PROCEDURE — 84132 ASSAY OF SERUM POTASSIUM: CPT | Mod: 91 | Performed by: SPECIALIST

## 2024-05-04 PROCEDURE — 2500000002 HC RX 250 W HCPCS SELF ADMINISTERED DRUGS (ALT 637 FOR MEDICARE OP, ALT 636 FOR OP/ED): Performed by: SPECIALIST

## 2024-05-04 PROCEDURE — 83735 ASSAY OF MAGNESIUM: CPT

## 2024-05-04 PROCEDURE — 82947 ASSAY GLUCOSE BLOOD QUANT: CPT | Mod: 91

## 2024-05-04 PROCEDURE — 36415 COLL VENOUS BLD VENIPUNCTURE: CPT | Performed by: SPECIALIST

## 2024-05-04 PROCEDURE — 82947 ASSAY GLUCOSE BLOOD QUANT: CPT

## 2024-05-04 PROCEDURE — 93010 ELECTROCARDIOGRAM REPORT: CPT | Performed by: INTERNAL MEDICINE

## 2024-05-04 PROCEDURE — 2020000001 HC ICU ROOM DAILY

## 2024-05-04 PROCEDURE — 85027 COMPLETE CBC AUTOMATED: CPT

## 2024-05-04 PROCEDURE — C9113 INJ PANTOPRAZOLE SODIUM, VIA: HCPCS

## 2024-05-04 PROCEDURE — 2500000004 HC RX 250 GENERAL PHARMACY W/ HCPCS (ALT 636 FOR OP/ED): Performed by: SURGERY

## 2024-05-04 PROCEDURE — 2500000004 HC RX 250 GENERAL PHARMACY W/ HCPCS (ALT 636 FOR OP/ED): Performed by: SPECIALIST

## 2024-05-04 PROCEDURE — 99222 1ST HOSP IP/OBS MODERATE 55: CPT | Performed by: INTERNAL MEDICINE

## 2024-05-04 RX ORDER — METOPROLOL TARTRATE 1 MG/ML
INJECTION, SOLUTION INTRAVENOUS
Status: COMPLETED
Start: 2024-05-04 | End: 2024-05-04

## 2024-05-04 RX ORDER — INSULIN LISPRO 100 [IU]/ML
6 INJECTION, SOLUTION INTRAVENOUS; SUBCUTANEOUS ONCE
Status: COMPLETED | OUTPATIENT
Start: 2024-05-04 | End: 2024-05-04

## 2024-05-04 RX ORDER — NOREPINEPHRINE BITARTRATE/D5W 8 MG/250ML
PLASTIC BAG, INJECTION (ML) INTRAVENOUS
Status: COMPLETED
Start: 2024-05-04 | End: 2024-05-04

## 2024-05-04 RX ORDER — PANTOPRAZOLE SODIUM 40 MG/10ML
40 INJECTION, POWDER, LYOPHILIZED, FOR SOLUTION INTRAVENOUS 2 TIMES DAILY
Status: DISCONTINUED | OUTPATIENT
Start: 2024-05-04 | End: 2024-05-26 | Stop reason: HOSPADM

## 2024-05-04 RX ORDER — DEXTROSE 50 % IN WATER (D50W) INTRAVENOUS SYRINGE
12.5
Status: DISCONTINUED | OUTPATIENT
Start: 2024-05-04 | End: 2024-05-26 | Stop reason: HOSPADM

## 2024-05-04 RX ORDER — NOREPINEPHRINE BITARTRATE/D5W 8 MG/250ML
.01-.5 PLASTIC BAG, INJECTION (ML) INTRAVENOUS CONTINUOUS
Status: DISCONTINUED | OUTPATIENT
Start: 2024-05-04 | End: 2024-05-08

## 2024-05-04 RX ORDER — POTASSIUM CHLORIDE 14.9 MG/ML
INJECTION INTRAVENOUS
Status: COMPLETED
Start: 2024-05-04 | End: 2024-05-04

## 2024-05-04 RX ORDER — POTASSIUM CHLORIDE 14.9 MG/ML
20 INJECTION INTRAVENOUS ONCE
Status: COMPLETED | OUTPATIENT
Start: 2024-05-04 | End: 2024-05-04

## 2024-05-04 RX ORDER — METOPROLOL TARTRATE 1 MG/ML
5 INJECTION, SOLUTION INTRAVENOUS ONCE
Status: COMPLETED | OUTPATIENT
Start: 2024-05-04 | End: 2024-05-04

## 2024-05-04 RX ORDER — DEXTROSE, SODIUM CHLORIDE, SODIUM LACTATE, POTASSIUM CHLORIDE, AND CALCIUM CHLORIDE 5; .6; .31; .03; .02 G/100ML; G/100ML; G/100ML; G/100ML; G/100ML
100 INJECTION, SOLUTION INTRAVENOUS CONTINUOUS
Status: DISCONTINUED | OUTPATIENT
Start: 2024-05-04 | End: 2024-05-05

## 2024-05-04 RX ADMIN — POLYETHYLENE GLYCOL 3350 17 G: 17 POWDER, FOR SOLUTION ORAL at 06:24

## 2024-05-04 RX ADMIN — INSULIN HUMAN 6 UNITS/HR: 1 INJECTION, SOLUTION INTRAVENOUS at 11:02

## 2024-05-04 RX ADMIN — SODIUM CHLORIDE, SODIUM LACTATE, POTASSIUM CHLORIDE, CALCIUM CHLORIDE AND DEXTROSE MONOHYDRATE 100 ML/HR: 5; 600; 310; 30; 20 INJECTION, SOLUTION INTRAVENOUS at 17:30

## 2024-05-04 RX ADMIN — POTASSIUM CHLORIDE 20 MEQ: 14.9 INJECTION INTRAVENOUS at 20:20

## 2024-05-04 RX ADMIN — METOPROLOL TARTRATE 2.5 MG: 1 INJECTION, SOLUTION INTRAVENOUS at 11:05

## 2024-05-04 RX ADMIN — POTASSIUM CHLORIDE 20 MEQ: 14.9 INJECTION, SOLUTION INTRAVENOUS at 20:20

## 2024-05-04 RX ADMIN — SODIUM CHLORIDE, POTASSIUM CHLORIDE, SODIUM LACTATE AND CALCIUM CHLORIDE 500 ML: 600; 310; 30; 20 INJECTION, SOLUTION INTRAVENOUS at 11:04

## 2024-05-04 RX ADMIN — NOREPINEPHRINE BITARTRATE 0.01 MCG/KG/MIN: 8 INJECTION, SOLUTION INTRAVENOUS at 11:03

## 2024-05-04 RX ADMIN — SODIUM CHLORIDE, POTASSIUM CHLORIDE, SODIUM LACTATE AND CALCIUM CHLORIDE 1000 ML: 600; 310; 30; 20 INJECTION, SOLUTION INTRAVENOUS at 13:47

## 2024-05-04 RX ADMIN — Medication 2 L/MIN: at 20:00

## 2024-05-04 RX ADMIN — LEVOTHYROXINE SODIUM 125 MCG: 125 TABLET ORAL at 06:24

## 2024-05-04 RX ADMIN — INSULIN HUMAN 17 UNITS/HR: 1 INJECTION, SOLUTION INTRAVENOUS at 17:25

## 2024-05-04 RX ADMIN — INSULIN HUMAN 10 UNITS: 100 INJECTION, SOLUTION PARENTERAL at 10:45

## 2024-05-04 RX ADMIN — PANTOPRAZOLE SODIUM 40 MG: 40 INJECTION, POWDER, FOR SOLUTION INTRAVENOUS at 19:49

## 2024-05-04 RX ADMIN — Medication 0.01 MCG/KG/MIN: at 11:03

## 2024-05-04 RX ADMIN — INSULIN LISPRO 6 UNITS: 100 INJECTION, SOLUTION INTRAVENOUS; SUBCUTANEOUS at 09:05

## 2024-05-04 RX ADMIN — INSULIN HUMAN 12 UNITS/HR: 1 INJECTION, SOLUTION INTRAVENOUS at 21:14

## 2024-05-04 RX ADMIN — Medication 2 L/MIN: at 13:45

## 2024-05-04 ASSESSMENT — PAIN - FUNCTIONAL ASSESSMENT
PAIN_FUNCTIONAL_ASSESSMENT: 0-10
PAIN_FUNCTIONAL_ASSESSMENT: CPOT (CRITICAL CARE PAIN OBSERVATION TOOL)
PAIN_FUNCTIONAL_ASSESSMENT: CPOT (CRITICAL CARE PAIN OBSERVATION TOOL)
PAIN_FUNCTIONAL_ASSESSMENT: 0-10
PAIN_FUNCTIONAL_ASSESSMENT: CPOT (CRITICAL CARE PAIN OBSERVATION TOOL)

## 2024-05-04 ASSESSMENT — PAIN SCALES - GENERAL
PAINLEVEL_OUTOF10: 0 - NO PAIN

## 2024-05-04 NOTE — H&P
[facility] Critical Care Medicine   History & Physical      Date:  5/4/2024  Patient:  Edwar Hemphill  YOB: 1985  MRN:  [id number]   Admit Date:  4/30/2024  No chief complaint on file.       History of Present Illness:  PT known to service from prior admissions. Hx obtained from chart as pt has altered mental status.  Apparently pt had symptoms of back pain which brought him to the hospital. He was found to be febrile with MRSA bacteremia.  MRI was done to r/o and infectious focus of his spine which was negative.  He had an elevated anion gap on admission which worsened over the course of 3 days in addition to persistent hyperglycemia. This morning the patient was noted to be kussmaul breathing and his glucose was 535 with an AG of 44 and a bicarb of 2. He was transferred to ICU for management.     ROS:  Review of Systems   Reason unable to perform ROS: altered mental status.       Medical History:  Past Medical History:   Diagnosis Date    Chronic kidney disease     Diabetes mellitus (Multi)     ESRD (end stage renal disease) (Multi)     Essential (primary) hypertension 05/26/2017    HTN (hypertension), benign    GERD (gastroesophageal reflux disease)     Hyperlipidemia     Hypothyroidism      Past Surgical History:   Procedure Laterality Date    ARM AMPUTATION AT ELBOW Right 05/2021    AV FISTULA PLACEMENT W/ PTFE      CT AORTA AND BILATERAL ILIOFEMORAL RUNOFF ANGIOGRAM W AND/OR WO IV CONTRAST  02/09/2023    CT AORTA AND BILATERAL ILIOFEMORAL RUNOFF ANGIOGRAM W AND/OR WO IV CONTRAST 2/9/2023 [facility]CTOR OFF[facility]    IR CVC EXCHANGE  11/13/2023    IR CVC EXCHANGE 11/13/2023 [facility] CVEPINV    IR CVC PICC  10/19/2023    IR CVC PICC    IR CVC PICC  2/28/2022    IR CVC PICC    LEG AMPUTATION THROUGH KNEE Left 07/08/2023    LEG AMPUTATION THROUGH LOWER TIBIA AND FIBULA Left 07/05/2023    TOE AMPUTATION Left 02/17/2023    left 4th    WOUND DEBRIDEMENT Left 07/26/2023    leg    WOUND DEBRIDEMENT Left 08/02/2023     multiple leg debridements     Medications Prior to Admission   Medication Sig Dispense Refill Last Dose    acetaminophen (Tylenol) 325 mg tablet Take 2 tablets (650 mg) by mouth every 4 hours if needed for moderate pain (4 - 6). 30 tablet 0 5/2/2024    apixaban (Eliquis) 5 mg tablet Take 1 tablet (5 mg) by mouth twice a day.   5/2/2024    atorvastatin (Lipitor) 40 mg tablet Take 1 tablet (40 mg) by mouth once daily.   5/2/2024    B complex-vitamin C-folic acid (Nephrocaps) 1 mg capsule Take 1 capsule by mouth once daily.   5/2/2024    Basaglar KwikPen U-100 Insulin 100 unit/mL (3 mL) pen Inject 12 Units under the skin once daily at bedtime.   5/2/2024    epoetin tyson-epbx (Retacrit) 10,000 unit/mL injection Inject 1 mL (10,000 Units) under the skin 3 times a week. 12 mL 11 5/2/2024    insulin glargine (Lantus) 100 unit/mL injection Inject 12 Units under the skin once daily at bedtime. Take as directed per insulin instructions. 3.6 mL 11 5/2/2024    insulin lispro (HumaLOG) 100 unit/mL injection Inject 0-0.15 mL (0-15 Units) under the skin 3 times a day with meals. Take as directed per insulin instructions. 13.5 mL 11 5/2/2024    levothyroxine (Synthroid, Levoxyl) 125 mcg tablet Take 1 tablet (125 mcg) by mouth once daily in the morning. Take before meals.   5/2/2024    loperamide (Imodium) 2 mg capsule Take 1 capsule (2 mg) by mouth every 4 hours if needed for diarrhea.   5/2/2024    melatonin 3 mg tablet Take 1 tablet (3 mg) by mouth as needed at bedtime for sleep for up to 15 doses. 15 tablet 0 5/2/2024    nitroglycerin (Nitrostat) 0.4 mg SL tablet Place 1 tablet (0.4 mg) under the tongue every 5 minutes if needed for chest pain.   5/2/2024    oxyCODONE-acetaminophen (Percocet) 5-325 mg tablet Take 1 tablet by mouth every 6 hours if needed for severe pain (7 - 10). 10 tablet 0 5/2/2024    pantoprazole (ProtoNix) 40 mg EC tablet Take 1 tablet (40 mg) by mouth once daily in the morning. Take before meals. Do not  crush, chew, or split. Do not start before November 17, 2023. 30 tablet 11 5/2/2024    polyethylene glycol (Glycolax, Miralax) 17 gram packet Take 17 g by mouth once daily. DISSOLVE IN 4-8 OUNCES OF LIQUID AND TAKE ORALLY AS DIRECTED   5/2/2024    sennosides (Senokot) 8.6 mg tablet Take 1 tablet (8.6 mg) by mouth twice a day.   5/2/2024    sevelamer carbonate (Renvela) 800 mg tablet Take 3 tablets (2,400 mg) by mouth 3 times a day with meals.   5/2/2024    simethicone (Mylicon) 80 mg chewable tablet Chew 1 tablet (80 mg) every 8 hours if needed for flatulence.   5/2/2024    pantoprazole (ProtoNix) 40 mg EC tablet Take 1 tablet (40 mg) by mouth once daily in the morning. Take before meals. Do not crush, chew, or split. (Patient not taking: Reported on 4/16/2024) 30 tablet 11 Not Taking     Cashew nut  Social History     Tobacco Use    Smoking status: Every Day     Current packs/day: 0.25     Average packs/day: 0.3 packs/day for 15.0 years (3.8 ttl pk-yrs)     Types: Cigarettes    Smokeless tobacco: Never     Family History   Problem Relation Name Age of Onset    Other (DIABETES DUE TO UNDERLYING CONDITION WITH MICROALBUMINURIA) Father      Other (DIABETES DUE TO UNDERLYING CONDITION WITH MICROALBUMINURIA [Other]) Other AUNT     Other (DIABETES DUE TO UNDERLYING CONDITION WITH MICROALBUMINURIA [Other]) Other GP        Hospital Medications:  apixaban, 5 mg, oral, BID  atorvastatin, 40 mg, oral, Nightly  B complex-vitamin C-folic acid, 1 capsule, oral, Daily  heparin, 3,100 Units, intra-catheter, After Dialysis  heparin, 3,100 Units, intra-catheter, After Dialysis  lactated Ringer's, 1,000 mL, intravenous, Once  levothyroxine, 125 mcg, oral, Daily before breakfast  oxygen, , inhalation, Continuous - Inhalation  polyethylene glycol, 17 g, oral, Daily  sennosides, 8.6 mg, oral, BID  sevelamer carbonate, 2,400 mg, oral, TID with meals        Physical Exam:  Heart Rate:  []   Temp:  [36.2 °C (97.2 °F)-37.2 °C (99 °F)]    Resp:  [16-42]   BP: ()/(12-89)   Weight:  [65.8 kg (145 lb 1 oz)]   SpO2:  [91 %-98 %]          Physical Exam  Constitutional:       Appearance: He is ill-appearing.   HENT:      Mouth/Throat:      Mouth: Mucous membranes are moist.   Eyes:      Pupils: Pupils are equal, round, and reactive to light.   Cardiovascular:      Rate and Rhythm: Normal rate and regular rhythm.   Pulmonary:      Breath sounds: Normal breath sounds.      Comments: Kussmaul pattern  Abdominal:      General: There is no distension.      Palpations: Abdomen is soft.      Tenderness: There is no abdominal tenderness.   Musculoskeletal:      Comments: Left AKA and right arm amputation. Right groin dialysis catheter in place.   Skin:     General: Skin is warm and dry.   Neurological:      Mental Status: He is disoriented.      Motor: Weakness present.         Objective:    Intake/Output Summary (Last 24 hours) at 5/4/2024 1429  Last data filed at 5/4/2024 0700  Gross per 24 hour   Intake 380 ml   Output 0 ml   Net 380 ml         Scheduled medications  apixaban, 5 mg, oral, BID  atorvastatin, 40 mg, oral, Nightly  B complex-vitamin C-folic acid, 1 capsule, oral, Daily  heparin, 3,100 Units, intra-catheter, After Dialysis  heparin, 3,100 Units, intra-catheter, After Dialysis  lactated Ringer's, 1,000 mL, intravenous, Once  levothyroxine, 125 mcg, oral, Daily before breakfast  oxygen, , inhalation, Continuous - Inhalation  polyethylene glycol, 17 g, oral, Daily  sennosides, 8.6 mg, oral, BID  sevelamer carbonate, 2,400 mg, oral, TID with meals      Continuous medications  insulin regular infusion, 0-20 Units/hr, Last Rate: 13.5 Units/hr (05/04/24 1422)  norepinephrine, 0.01-0.5 mcg/kg/min, Last Rate: 0.04 mcg/kg/min (05/04/24 1228)  oxygen, , Last Rate: 2 L/min (05/03/24 1758)      PRN medications  PRN medications: dextrose, fentaNYL PF, glucagon, heparin, insulin regular, lidocaine PF, loperamide, midazolam, nitroglycerin, ondansetron,  oxyCODONE, oxygen, simethicone, vancomycin    LABS:  CMP:  Results from last 7 days   Lab Units 05/03/24  1442 04/30/24  1345   SODIUM mmol/L 135* 132*   POTASSIUM mmol/L 4.5 4.7   CHLORIDE mmol/L 93* 87*   CO2 mmol/L 12* 16*   ANION GAP mmol/L 35* 34*   BUN mg/dL 25* 72*   CREATININE mg/dL 3.78* 9.76*   EGFR mL/min/1.73m*2 20* 6*   MAGNESIUM mg/dL  --  2.10   ALBUMIN g/dL 3.1* 3.4   ALT U/L  --  10   AST U/L  --  10   BILIRUBIN TOTAL mg/dL  --  1.3*     CBC:  Results from last 7 days   Lab Units 05/03/24  1442 04/30/24  1345   WBC AUTO x10*3/uL 9.9 10.6   HEMOGLOBIN g/dL 10.6* 11.2*   HEMATOCRIT % 31.9* 34.7*   PLATELETS AUTO x10*3/uL 130* 101*   MCV fL 85 87     COAG:     HEME/ENDO:     CARDIAC:         Recent Imaging  MR lumbar spine wo IV contrast   Final Result   Diffusely hypointense bone marrow signal on the T1 weighted images   may be related to renal osteodystrophy. Differential considerations   include anemia, reactive bone marrow or bone marrow infiltrating   process.        No definite imaging findings to suggest discitis osteomyelitis on the   current exam.        Degenerative changes without significant spinal canal stenosis.   Varying degrees of mild neural foraminal narrowing as detailed above.        I personally reviewed the images/study and I agree with the findings   as stated by Dr. Lowe.        MACRO:   None        Signed by: Eva Og 5/4/2024 11:11 AM   Dictation workstation:   [id number]      IR CVC exchange   Final Result   Fluoroscopically guidewire exchange of right groin tunneled   hemodialysis catheter. The newly exchanged catheter is ready for   immediate use.             MACRO:   None        Signed by: Ronaldo Dempsey 5/3/2024 8:09 PM   Dictation workstation:   [id number]      Transthoracic Echo Complete   Final Result      XR chest 1 view   Final Result   Patchy right basilar infiltrate or atelectasis with small right   pleural effusion.   Signed by Joseph Overton MD       CT thoracic spine wo IV contrast   Final Result   Thoracic spine:   1.  Portions of T1 and T2 cough on this study.   2.  No acute osseous findings seen from T3 through T12.   3.  Tiny bilateral effusions with adjacent lower lobe infiltrates.   4.  The distal adenopathy.   5.  Coronary artery calcifications.   Lumbar spine:   1.  No compression deformities or spinal listhesis is noted.   2.  Disc bulging from L3 to S1.  Evaluation for spinal stenosis is   limited due to lack mammographic contrast.   3.  Incidental note of a long dialysis catheter within the inferior   vena cava.   Signed by Deejay Hoffman MD      CT lumbar spine wo IV contrast   Final Result   Thoracic spine:   1.  Portions of T1 and T2 cough on this study.   2.  No acute osseous findings seen from T3 through T12.   3.  Tiny bilateral effusions with adjacent lower lobe infiltrates.   4.  The distal adenopathy.   5.  Coronary artery calcifications.   Lumbar spine:   1.  No compression deformities or spinal listhesis is noted.   2.  Disc bulging from L3 to S1.  Evaluation for spinal stenosis is   limited due to lack mammographic contrast.   3.  Incidental note of a long dialysis catheter within the inferior   vena cava.   Signed by Deejay Hoffman MD      Lower extremity venous duplex right   Final Result      XR pelvis 1-2 views   Final Result   Slightly limited secondary to underlying osteopenia.  No acute osseous   abnormality.   Signed by Roe Ruiz MD      IR CVC exchange    (Results Pending)       Transthoracic Echo Complete    Result Date: 5/3/2024   [facility], [address] [place] [address]              Tel [phone] and Fax [phone] TRANSTHORACIC ECHOCARDIOGRAM REPORT  Patient Name:      XIOMARA Maya Physician:    [id number] Brandon Nur DO Study Date:        5/3/2024             Ordering Provider:    [id number] ALKA VIEYRA MRN/PID:            [id number]             Fellow: Accession#:        [id number]         Nurse: Date of Birth/Age: 1985 / 39 years Sonographer:          Sy Lyons RDCS Gender:            M                    Additional Staff: Height:            175.00 cm            Admit Date: Weight:            65.80 kg             Admission Status:     Inpatient -                                                               Routine BSA / BMI:         1.80 m2 / 21.49      Encounter#:           [id number]                    kg/m2                                         Department Location:  [facility]                                                               Invasive Blood Pressure: 112 /74 mmHg Study Type:    TRANSTHORACIC ECHO (TTE) COMPLETE Diagnosis/ICD: Endocarditis, valve unspecified-I38 Indication:    endocarditis CPT Code:      Echo Complete w Full [id number] Patient History: Pertinent History: HTN and Hyperlipidemia. Study Detail: The following Echo studies were performed: M-Mode, Doppler, color               flow and 2D.  PHYSICIAN INTERPRETATION: Left Ventricle: The left ventricular systolic function is moderately decreased, with an estimated ejection fraction of 35-40%. There are no regional wall motion abnormalities. The left ventricular cavity size is normal. Abnormal (paradoxical) septal motion, consistent with left bundle branch block. Spectral Doppler shows an impaired relaxation pattern of left ventricular diastolic filling. Left Atrium: The left atrium is normal in size. Right Ventricle: The right ventricle is normal in size. There is normal right ventricular global systolic function. Right Atrium: The right atrium is normal in size. Aortic Valve: The aortic valve is probably trileaflet. There is no evidence of aortic valve regurgitation. Mitral Valve: The mitral valve is mildly thickened. There is trace mitral valve regurgitation. Tricuspid Valve: The tricuspid valve is structurally normal. No evidence of  tricuspid regurgitation. Pulmonic Valve: The pulmonic valve is structurally normal. There is no indication of pulmonic valve regurgitation. Pericardium: There is no pericardial effusion noted. Aorta: The aortic root is normal. In comparison to the previous echocardiogram(s): Compared with study from 1/16/2024, LV function has declined. Was previously normal.  CONCLUSIONS:  1. Left ventricular systolic function is moderately decreased with a 35-40% estimated ejection fraction.  2. Abnormal septal motion consistent with left bundle branch block.  3. Spectral Doppler shows an impaired relaxation pattern of left ventricular diastolic filling.  4. No vegetations visualized within the limitations of this study.  5. Compared with study from 1/16/2024, LV function has declined. Was previously normal. QUANTITATIVE DATA SUMMARY: LA VOLUME:                               Normal Ranges: LA Vol A4C:        40.1 ml    (22+/-6mL/m2) LA Vol A2C:        35.3 ml LA Vol BP:         40.9 ml LA Vol Index A4C:  22.3ml/m2 LA Vol Index A2C:  19.6 ml/m2 LA Vol Index BP:   22.7 ml/m2 LA Area A4C:       15.3 cm2 LA Area A2C:       13.2 cm2 LA Major Axis A4C: 5.0 cm LA Major Axis A2C: 4.2 cm LA Volume Index:   22.7 ml/m2  [id number] Brandon Nur DO Electronically signed on 5/3/2024 at 4:24:04 PM  ** Final **      Assessment/Plan   Principal Problem:    SIRS (systemic inflammatory response syndrome) (Multi)      Pt is a 40yo male with a chronic hx of dialysis non-compliance, recurrent bactermia episodes, and multiple access issues for HD presenting with MRSA bactermia and now with severe DKA.     Plan:  Neuro/Psych/Pain Ctrl/Sedation:  #altered mental status  -metabolic encephalopathy due to hyperglycemia and electrolyte disturbances  -cont to monitor     Respiratory/ENT:  #metabolic acidosis with incomplete respiratory compensation  -breathing pattern has improved since pt has arrived  -monitor for now, no indication for  intubation      Cardiovascular:  #MRSA bactermia--TTE without evidence of vegetations    Renal/Volume Status:  #ESRD on HD  -rec'd HD on Tue and Thurs  #difficult HD access  -right femoral vein line exchanged yesterday with IR    GI:  -NPO for now until AG closes    Infectious Disease:  #MRSA bactermia  -vancomycin, pharmacy dosing    Heme/Onc:  #anemia of chronic disease  -no indication for transfusion    Endocrine:  #DKA  -insulin drip, q1hr glucose monitoring  -maintain on dextrose with insulin as needed until AG closes  -endocrine following    Activity:  -as tolerates once mental status more clear    Ethics/Code Status:  Full code      :  DVT Prophylaxis: on eliquis  GI Prophylaxis: na  Bowel Regimen: na  Diet: npo  CVC: na  Judi: na  Castanon: anuric  Restraints: na  Dispo: icu      This critically ill patient continues to be at-risk for clinically significant deterioration / failure due to the above mentioned dysfunctional, unstable organ systems.  I have personally identified and managed all complex critical care issues to prevent aforementioned clinical deterioration.  Critical care time is spent at bedside and/or the immediate area and has included, but is not limited to, the review of diagnostic tests, labs, radiographs, serial assessments of hemodynamics, respiratory status, ventilatory management, and family updates.  Time spent in procedures and teaching are reported separately.     Critical Care Time:  90 minutes    Veronica De La Paz MD

## 2024-05-04 NOTE — CONSULTS
Inpatient consult to Endocrinology  Consult performed by: Mike aSntana MD  Consult ordered by: Mauricio Estrada MD      Reason For Consult  Diabetes    History Of Present Illness  Edwar Hemphill is a 39 y.o. male admitted 4 days ago    Duration of type 2 diabetes mellitus:  28 years  Complications:  ESRD  Amputations    No basal insulin since admission  Insulin lispro scale, none given yesterday    Home regimen:  Insulin glargine, unspecified    Patient previously admitted 4 months ago, also had anion gap acidosis.  He denied any history of DKA at that time.     A1c 6.8% (3/30/24)      Medications    Current Facility-Administered Medications:     apixaban (Eliquis) tablet 5 mg, 5 mg, oral, BID, Mauricio Estrada MD, 5 mg at 05/03/24 2156    atorvastatin (Lipitor) tablet 40 mg, 40 mg, oral, Nightly, Mauricio Estrada MD, 40 mg at 05/03/24 2156    B complex-vitamin C-folic acid (Nephrocaps) capsule 1 capsule, 1 capsule, oral, Daily, Mauricio Estrada MD, 1 capsule at 05/02/24 0854    dextrose 50 % injection 12.5 g, 12.5 g, intravenous, q15 min PRN, Veronica De La Paz MD    fentaNYL PF (Sublimaze) injection, , , PRN, Ronaldo Dempsey MD, 50 mcg at 05/03/24 1816    glucagon (Glucagen) injection 1 mg, 1 mg, intramuscular, q15 min PRN, Veronica De La Paz MD    heparin 1,000 unit/mL injection 3,100 Units, 3,100 Units, intra-catheter, After Dialysis, Michelle Drummond PA-C, 3,100 Units at 05/03/24 1210    heparin 1,000 unit/mL injection 3,100 Units, 3,100 Units, intra-catheter, After Dialysis, Michelle Drummond PA-C, 3,100 Units at 05/03/24 1210    heparin 1,000 unit/mL injection, , , PRN, Ronaldo Dempsey MD, 2,400 Units at 05/03/24 1824    insulin regular (HumuLIN R,NovoLIN R) injection  - Omnicell Override Pull, , , ,     insulin regular (HumuLIN R,NovoLIN R) injection 10 Units, 10 Units, subcutaneous, Once, Veronica De La Paz MD    insulin regular (HumuLIN, NovoLIN) bolus from bag 0-10 Units, 0-10 Units, intravenous, Once,  Veronica De La Paz MD    insulin regular (HumuLIN, NovoLIN) bolus from bag 2-6 Units, 2-6 Units, intravenous, PRN, Veronica De La Paz MD    insulin regular 100 unit/100 mL (1 unit/mL) in 0.9 % NaCl infusion, 0-20 Units/hr, intravenous, Continuous, Veronica De La Paz MD    lactated Ringer's bolus 500 mL, 500 mL, intravenous, Once, Veronica De La Paz MD    levothyroxine (Synthroid, Levoxyl) tablet 125 mcg, 125 mcg, oral, Daily before breakfast, Mauricio Estrada MD, 125 mcg at 05/04/24 0624    lidocaine PF (Xylocaine) 10 mg/mL (1 %) injection, , , PRN, Ronaldo Dempsey MD, 5 mL at 05/03/24 1816    loperamide (Imodium) capsule 2 mg, 2 mg, oral, q4h PRN, Maruicio Estrada MD    metoprolol tartrate (Lopressor) injection  - Omnicell Override Pull, , , ,     metoprolol tartrate (Lopressor) injection 5 mg, 5 mg, intravenous, Once, Veronica De La Paz MD    midazolam (Versed) injection, , , PRN, Ronaldo Dempsey MD, 1 mg at 05/03/24 1816    nitroglycerin (Nitrostat) SL tablet 0.4 mg, 0.4 mg, sublingual, q5 min PRN, Mauricio Estrada MD    ondansetron (Zofran) injection 4 mg, 4 mg, intravenous, q6h PRN, Mauricio Estrada MD    oxyCODONE (Roxicodone) immediate release tablet 5 mg, 5 mg, oral, q4h PRN, Mauricio Estrada MD, 5 mg at 05/03/24 1029    oxygen (O2) therapy, , , Continuous PRN, Ronaldo Dempsey MD, Last Rate: 120,000 mL/hr at 05/03/24 1758, 2 L/min at 05/03/24 1758    polyethylene glycol (Glycolax, Miralax) packet 17 g, 17 g, oral, Daily, Mauricio Estrada MD, 17 g at 05/04/24 0624    sennosides (Senokot) tablet 8.6 mg, 8.6 mg, oral, BID, Mauricio Estrada MD, 8.6 mg at 05/03/24 2156    sevelamer carbonate (Renvela) tablet 2,400 mg, 2,400 mg, oral, TID with meals, Mauricio Estrada MD, 2,400 mg at 05/02/24 1150    simethicone (Mylicon) chewable tablet 80 mg, 80 mg, oral, q8h PRN, Mauricio Estrada MD    vancomycin (Vancocin) pharmacy to dose - pharmacy monitoring, , miscellaneous, Daily PRN, Mat Jauregui MD     Past Medical  "History  He has a past medical history of Chronic kidney disease, Diabetes mellitus (Multi), ESRD (end stage renal disease) (Multi), Essential (primary) hypertension (05/26/2017), GERD (gastroesophageal reflux disease), Hyperlipidemia, and Hypothyroidism.    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     Social History  He reports that he has been smoking cigarettes. He has a 3.8 pack-year smoking history. He has never used smokeless tobacco. No history on file for alcohol use and drug use.    Family History  Family History   Problem Relation Name Age of Onset    Other (DIABETES DUE TO UNDERLYING CONDITION WITH MICROALBUMINURIA) Father      Other (DIABETES DUE TO UNDERLYING CONDITION WITH MICROALBUMINURIA [Other]) Other AUNT     Other (DIABETES DUE TO UNDERLYING CONDITION WITH MICROALBUMINURIA [Other]) Other GP        Allergies  Cashew nut    Review of Systems   Reason unable to perform ROS: Patient obtunded.         Last Recorded Vitals  Blood pressure 83/50, pulse 81, temperature 36.4 °C (97.5 °F), temperature source Oral, resp. rate (!) 34, height 1.753 m (5' 9\"), weight 65.8 kg (145 lb), SpO2 96%.    Physical Exam  Constitutional:       Appearance: He is ill-appearing.      Comments: obtunded   Abdominal:      Palpations: Abdomen is soft.      Tenderness: There is no abdominal tenderness.   Musculoskeletal:      Comments: Right arm amputation  Left leg AKA          Relevant Results  Lab Results   Component Value Date    POCGLU 391 (H) " 05/04/2024    POCGLU 412 (H) 05/04/2024    POCGLU 300 (H) 05/03/2024    POCGLU 245 (H) 05/03/2024    POCGLU 200 (H) 05/03/2024    GLUCOSE 243 (H) 05/03/2024    GLUCOSE 241 (H) 04/30/2024    GLUCOSE 140 (H) 02/02/2024    GLUCOSE 247 (H) 01/22/2024    GLUCOSE 455 (HH) 01/21/2024      Latest Reference Range & Units 05/03/24 14:42   GLUCOSE 74 - 99 mg/dL 243 (H)   SODIUM 136 - 145 mmol/L 135 (L)   POTASSIUM 3.5 - 5.3 mmol/L 4.5   CHLORIDE 98 - 107 mmol/L 93 (L)   Bicarbonate 21 - 32 mmol/L 12 (L)   Anion Gap 10 - 20 mmol/L 35 (H)   Blood Urea Nitrogen 6 - 23 mg/dL 25 (H)   Creatinine 0.50 - 1.30 mg/dL 3.78 (H)   EGFR >60 mL/min/1.73m*2 20 (L)   Calcium 8.6 - 10.3 mg/dL 9.4   PHOSPHORUS 2.5 - 4.9 mg/dL 4.0   Albumin 3.4 - 5.0 g/dL 3.1 (L)       IMPRESSION  DIABETIC KETOACIDOSIS  Anion gap acidosis and hyperglycemia, present on admission  Patient had denied prior history of known DKA  No assessment of ketosis available to confirm whether DKA vs acidosis due to illness, renal failure  No basal insulin since admission  No short acting insulin yesteray      RECOMMENDATIONS  Agree with IV insulin plan  Added beta-hydroxybutyrate to yesterday's lab draw to confirm ketoacidosis.  Plan conversion to subcutaneous insulin upon recovery      Mike Santana MD

## 2024-05-04 NOTE — PROGRESS NOTES
"Vancomycin Dosing by Pharmacy- FOLLOW UP    Edwar Hemphill is a 39 y.o. year old male who Pharmacy has been consulted for vancomycin dosing for other bacteremia . Based on the patient's indication and renal status this patient is being dosed based on a goal trough/random level of 15-20.     Renal function is currently stable.    Received a 1x dose on 5/3 @ 2155 of 1000 mg     Most recent random level: 25.3  mcg/mL    Visit Vitals  BP 93/71   Pulse 102   Temp 36.2 °C (97.2 °F) (Temporal)   Resp 23        Lab Results   Component Value Date    CREATININE 3.78 (H) 05/03/2024    CREATININE 9.76 (H) 04/30/2024    CREATININE 10.95 (H) 02/02/2024    CREATININE 6.81 (H) 01/22/2024        Patient weight is No results found for: \"PTWEIGHT\"    No results found for: \"CULTURE\"     I/O last 3 completed shifts:  In: 600 (9.1 mL/kg) [I.V.:400 (6.1 mL/kg); IV Piggyback:200]  Out: 100 (1.5 mL/kg) [Other:100]  Weight: 65.8 kg   [unfilled]    Lab Results   Component Value Date    PATIENTTEMP 37.0 05/04/2024    PATIENTTEMP 37.0 01/14/2024    PATIENTTEMP 37.0 07/09/2023    PATIENTTEMP 37.0 07/08/2023    PATIENTTEMP 37.0 07/08/2023        Assessment/Plan    Will hold dose today and obtain level before HD on Monday (5/6)   2. The next level will be obtained on 5/6 at 0500. May be obtained sooner if clinically indicated.   3. Will continue to monitor renal function daily while on vancomycin and order serum creatinine at least every 48 hours if not already ordered.4/ Follow for continued vancomycin needs, clinical response, and signs/symptoms of toxicity.       Brenna Bruner, PharmD           "

## 2024-05-04 NOTE — PROGRESS NOTES
Edwar Hemphill is a 39 y.o. male on day 4 of admission presenting with SIRS (systemic inflammatory response syndrome) (Multi).      Subjective   Worsening general status specially with DKA/sepsis stating moving down to ICU.       Objective        Vitals 24HR  Heart Rate:  []   Temp:  [36.2 °C (97.2 °F)-37.2 °C (99 °F)]   Resp:  [16-42]   BP: ()/(12-89)   Weight:  [65.8 kg (145 lb 1 oz)]   SpO2:  [91 %-98 %]     Intake/Output last 3 Shifts:    Intake/Output Summary (Last 24 hours) at 5/4/2024 1324  Last data filed at 5/4/2024 0700  Gross per 24 hour   Intake 380 ml   Output 0 ml   Net 380 ml       Physical Exam  General appearance: Somnolent but arousable  Head and ENT: Normocephalic/atraumatic/PERRL neck/ABD  Lungs; CTA  Heart: RRR  Dialysis access is right femoral tunneled dialysis catheter  Extremities: Right upper extremity amputation, left below-knee amputation.  Neurologic: Somnolent          Relevant Results     Results from last 7 days   Lab Units 05/03/24  1442 04/30/24  1345   WBC AUTO x10*3/uL 9.9 10.6   HEMOGLOBIN g/dL 10.6* 11.2*   HEMATOCRIT % 31.9* 34.7*   PLATELETS AUTO x10*3/uL 130* 101*      Results from last 7 days   Lab Units 05/03/24  1442 04/30/24  1345   SODIUM mmol/L 135* 132*   POTASSIUM mmol/L 4.5 4.7   CHLORIDE mmol/L 93* 87*   CO2 mmol/L 12* 16*   BUN mg/dL 25* 72*   CREATININE mg/dL 3.78* 9.76*   GLUCOSE mg/dL 243* 241*   CALCIUM mg/dL 9.4 9.3        Current Facility-Administered Medications:     apixaban (Eliquis) tablet 5 mg, 5 mg, oral, BID, OSBALDO Wilson, DNP, 5 mg at 05/03/24 2156    atorvastatin (Lipitor) tablet 40 mg, 40 mg, oral, Nightly, OSBALDO Wilson, DNP, 40 mg at 05/03/24 2156    B complex-vitamin C-folic acid (Nephrocaps) capsule 1 capsule, 1 capsule, oral, Daily, Andi Bazan APRN-CNP, DNP, 1 capsule at 05/02/24 0854    dextrose 50 % injection 12.5 g, 12.5 g, intravenous, q15 min PRN, Veronica De La Paz MD    fentaNYL PF  (Sublimaze) injection, , , PRN, Ronaldo Dempsey MD, 50 mcg at 05/03/24 1816    glucagon (Glucagen) injection 1 mg, 1 mg, intramuscular, q15 min PRN, Veronica De La Paz MD    heparin 1,000 unit/mL injection 3,100 Units, 3,100 Units, intra-catheter, After Dialysis, OSBALDO Wilson, LUCILA, 3,100 Units at 05/03/24 1210    heparin 1,000 unit/mL injection 3,100 Units, 3,100 Units, intra-catheter, After Dialysis, OSBALDO Wilson, LUCILA, 3,100 Units at 05/03/24 1210    heparin 1,000 unit/mL injection, , , PRN, Ronaldo Dempsey MD, 2,400 Units at 05/03/24 1824    insulin regular (HumuLIN, NovoLIN) bolus from bag 2-6 Units, 2-6 Units, intravenous, PRN, Veronica De La Paz MD    insulin regular 100 unit/100 mL (1 unit/mL) in 0.9 % NaCl infusion, 0-20 Units/hr, intravenous, Continuous, Veronica De La Paz MD, Last Rate: 13.5 mL/hr at 05/04/24 1317, 13.5 Units/hr at 05/04/24 1317    levothyroxine (Synthroid, Levoxyl) tablet 125 mcg, 125 mcg, oral, Daily before breakfast, OSBALDO Wilson, LUCILA, 125 mcg at 05/04/24 0624    lidocaine PF (Xylocaine) 10 mg/mL (1 %) injection, , , PRN, Ronaldo Dempsey MD, 5 mL at 05/03/24 1816    loperamide (Imodium) capsule 2 mg, 2 mg, oral, q4h PRN, OSBALDO Wilson DNP    midazolam (Versed) injection, , , PRN, Ronaldo Dempsey MD, 1 mg at 05/03/24 1816    nitroglycerin (Nitrostat) SL tablet 0.4 mg, 0.4 mg, sublingual, q5 min PRN, OSBALDO Wilson, LUCILA    norepinephrine (Levophed) 8 mg in dextrose 5% 250 mL (0.032 mg/mL) infusion (premix), 0.01-0.5 mcg/kg/min, intravenous, Continuous, Veronica De La Paz MD, Last Rate: 4.94 mL/hr at 05/04/24 1228, 0.04 mcg/kg/min at 05/04/24 1228    ondansetron (Zofran) injection 4 mg, 4 mg, intravenous, q6h PRN, OSBALDO Wilson, LUCILA    oxyCODONE (Roxicodone) immediate release tablet 5 mg, 5 mg, oral, q4h PRN, OSBALDO Wilson, LUCILA, 5 mg at 05/03/24 1029    oxygen (O2)  therapy, , , Continuous PRN, Ronaldo Dempsey MD, Last Rate: 120,000 mL/hr at 05/03/24 1758, 2 L/min at 05/03/24 1758    polyethylene glycol (Glycolax, Miralax) packet 17 g, 17 g, oral, Daily, Andi A Miktarian, APRN-CNP, DNP, 17 g at 05/04/24 0624    sennosides (Senokot) tablet 8.6 mg, 8.6 mg, oral, BID, Andi A Miktarian, APRN-CNP, DNP, 8.6 mg at 05/03/24 2156    sevelamer carbonate (Renvela) tablet 2,400 mg, 2,400 mg, oral, TID with meals, Andi A Miktarian, APRN-CNP, DNP, 2,400 mg at 05/02/24 1150    simethicone (Mylicon) chewable tablet 80 mg, 80 mg, oral, q8h PRN, Andi A Sajantarian, APRN-CNP, DNP    vancomycin (Vancocin) pharmacy to dose - pharmacy monitoring, , miscellaneous, Daily PRN, Andi A Miktarian, APRN-CNP, DNP           Assessment/Plan   1.  ESKD on hemodialysis with last hemodialysis being on 5/2 due to hyperkalemia  2.  Diabetes mellitus type 2, continue current management  3.  Exhausted all vascular access   4.  Multiple infected dialysis catheters exchange and removal with ID following closely.  5.  Metabolic bone disease due to CKD, will follow phosphate binders and vitamin D products as needed.     Will round patient daily evaluating labs and clinical status with all other consultants.      Principal Problem:    SIRS (systemic inflammatory response syndrome) (Multi)              I spent 35 minutes in the professional and overall care of this patient.      Gracie Hollins MD

## 2024-05-04 NOTE — PROGRESS NOTES
Sign out   Edwar Hemphill is a 39 y.o. male on day 4 of admission presenting with SIRS (systemic inflammatory response syndrome) (Multi).      Subjective   Became diaphoretic, tachypnea abg done and transferred to [facility] initially     Objective     Last Recorded Vitals  BP 83/50   Pulse 81   Temp 36.4 °C (97.5 °F) (Oral)   Resp (!) 34   Wt 65.8 kg (145 lb)   SpO2 96%   Intake/Output last 3 Shifts:    Intake/Output Summary (Last 24 hours) at 5/4/2024 1013  Last data filed at 5/4/2024 0700  Gross per 24 hour   Intake 780 ml   Output 100 ml   Net 680 ml       Admission Weight  Weight: 65.8 kg (145 lb) (04/30/24 0825)    Daily Weight  04/30/24 : 65.8 kg (145 lb)    Image Results  IR CVC exchange  Narrative: Interpreted By:  Ronaldo Dempsey,   STUDY:  IR CVC EXCHANGE; ;  5/3/2024 6:28 pm      FLUOROSCOPICALLY GUIDED EXCHANGE OF RIGHT GROIN TUNNELED HEMODIALYSIS  CATHETER      INDICATION:  Signs/Symptoms:Bacteremia- Right fem TDC exchange. 39-year-old man  with end-stage renal disease, central thoracic venous occlusion,  end-stage vascular access with bacteremia. Guidewire exchange of  right groin tunneled hemodialysis catheter in an attempt to salvage  the access.      COMPARISON:  Chest radiograph 04/30/2024, fluoroscopic images from prior tunneled  hemodialysis catheter placement 01/22/2024      ACCESSION NUMBER(S):  [id number]      ORDERING CLINICIAN:  SACHI MORTENSEN      TECHNIQUE:      ATTENDING : Ronaldo Dempsey M.D.      TECHNICAL DESCRIPTION/FINDINGS: The procedure, including all risks,  benefits and alternatives were explained to the patient in detail.  All questions were answered and written informed consent was obtained.      The patient was positioned supine on the angiography table. A  time-out was performed.      The right groin was prepped and draped in usual sterile fashion. A   fluoroscopic image was obtained demonstrating the tunneled  hemodialysis catheter terminating at the  inferior cavoatrial junction.      1% lidocaine was administered via the subcutaneous tunnel tract for  local anesthesia. Blunt dissection was performed to free the  subcutaneous catheter cuff. An 035 stiff Glidewire was then advanced  through the catheter into the right atrium. The old catheter was  removed. Over the guidewire, a new 14.5 Croatian by 42 cm tip to cuff  dual-lumen hemodialysis catheter was then placed. A completion  fluoroscopic image demonstrates the catheter in the inferior right  atrium.      Catheter lumens easily aspirated and flushed and were locked with a  concentrated heparinized solution (1000 units/cc). The catheter hub  was then sutured to the skin with 2 0 Prolene stay suture. Because of  losing along the tract, Gel-Foam pledgets were then administered  subcutaneously near where the catheter enters the skin for additional  hemostasis, followed by a pursestring suture. A sterile dressing was  applied.      SEDATION/MEDICATIONS: Continuous cardiopulmonary monitoring was  performed by a radiology nurse for the duration of the procedure.  1  mg Versed and   50 mcg Fentanyl were administered for moderate  conscious sedation time of 20 minutes.      10 cc 1% Lidocaine was  administered subcutaneously for local anesthesia. SPECIMENS: None.  ESTIMATED BLOOD LOSS:  5 cc  FLOUROSCOPY:  1.2 minutes; DAP  74963 mGy-cm*2; Air Kerma  59.07 mGy  CONTRAST: None.      FINDINGS:  Test      Impression: Fluoroscopically guidewire exchange of right groin tunneled  hemodialysis catheter. The newly exchanged catheter is ready for  immediate use.          MACRO:  None      Signed by: Ronaldo Dempsey 5/3/2024 8:09 PM  Dictation workstation:   [id number]  Transthoracic Echo Complete     [facility], [address] [place] [address]               Tel [phone] and Fax [phone]    TRANSTHORACIC ECHOCARDIOGRAM REPORT       Patient Name:      XIOMARA Maya Physician:     [id number] Brandon Nur DO  Study Date:        5/3/2024             Ordering Provider:    [id number] ALKA VIEYRA  MRN/PID:           [id number]             Fellow:  Accession#:        [id number]         Nurse:  Date of Birth/Age: 1985 / 39 years Sonographer:          Sy Lyons RDCS  Gender:            M                    Additional Staff:  Height:            175.00 cm            Admit Date:  Weight:            65.80 kg             Admission Status:     Inpatient -                                                                Routine  BSA / BMI:         1.80 m2 / 21.49      Encounter#:           [id number]                     kg/m2                                          Department Location:  [facility] Non                                                                Invasive  Blood Pressure: 112 /74 mmHg    Study Type:    TRANSTHORACIC ECHO (TTE) COMPLETE  Diagnosis/ICD: Endocarditis, valve unspecified-I38  Indication:    endocarditis  CPT Code:      Echo Complete w Full Doppler-19912    Patient History:  Pertinent History: HTN and Hyperlipidemia.    Study Detail: The following Echo studies were performed: M-Mode, Doppler, color                flow and 2D.       PHYSICIAN INTERPRETATION:  Left Ventricle: The left ventricular systolic function is moderately decreased, with an estimated ejection fraction of 35-40%. There are no regional wall motion abnormalities. The left ventricular cavity size is normal. Abnormal (paradoxical) septal motion, consistent with left bundle branch block. Spectral Doppler shows an impaired relaxation pattern of left ventricular diastolic filling.  Left Atrium: The left atrium is normal in size.  Right Ventricle: The right ventricle is normal in size. There is normal right ventricular global systolic function.  Right Atrium: The right atrium is normal in size.  Aortic Valve: The aortic valve is probably trileaflet. There  is no evidence of aortic valve regurgitation.  Mitral Valve: The mitral valve is mildly thickened. There is trace mitral valve regurgitation.  Tricuspid Valve: The tricuspid valve is structurally normal. No evidence of tricuspid regurgitation.  Pulmonic Valve: The pulmonic valve is structurally normal. There is no indication of pulmonic valve regurgitation.  Pericardium: There is no pericardial effusion noted.  Aorta: The aortic root is normal.  In comparison to the previous echocardiogram(s): Compared with study from 1/16/2024, LV function has declined. Was previously normal.       CONCLUSIONS:   1. Left ventricular systolic function is moderately decreased with a 35-40% estimated ejection fraction.   2. Abnormal septal motion consistent with left bundle branch block.   3. Spectral Doppler shows an impaired relaxation pattern of left ventricular diastolic filling.   4. No vegetations visualized within the limitations of this study.   5. Compared with study from 1/16/2024, LV function has declined. Was previously normal.    QUANTITATIVE DATA SUMMARY:  LA VOLUME:                                Normal Ranges:  LA Vol A4C:        40.1 ml    (22+/-6mL/m2)  LA Vol A2C:        35.3 ml  LA Vol BP:         40.9 ml  LA Vol Index A4C:  22.3ml/m2  LA Vol Index A2C:  19.6 ml/m2  LA Vol Index BP:   22.7 ml/m2  LA Area A4C:       15.3 cm2  LA Area A2C:       13.2 cm2  LA Major Axis A4C: 5.0 cm  LA Major Axis A2C: 4.2 cm  LA Volume Index:   22.7 ml/m2       [id number] Brandon Nur DO  Electronically signed on 5/3/2024 at 4:24:04 PM       ** Final **      PHYSICAL EXAM  NEUROLOGICAL: lethargic  HEENT: eyes closed   HEART: tachycardic   LUNGS: conducted sounds  Relevant Results               Assessment/Plan          39 yr old man with circulatory shock 2/2 sepsis   Uncontrolled dm 2/2 above  Pvd   Esrd    Principal Problem:    SIRS (systemic inflammatory response syndrome) (Multi)    I have also d/w endo earlier who has seen patient as  well     Gave history and sign out to ICU team        t32         Mauricio Estrada MD

## 2024-05-04 NOTE — NURSING NOTE
0713-called to pt bedside while getting report from [facility] Jemima CARLISLE. Pt states he is hot, denies pain. Pt appears diaphoretic, VS taken at this time. According to nightshift LPN this is an acute change in pt status   0714- VS T 97.5 F oral, RR 42, /77, 96% on RA. Pt still complaining he feels hot, ice packs applied  0742- RR 38  0750- First attempt to call Dr. Estrada's answering service  0758- Informed Dr. Estrada of situation. Wants BS check and ABG. Informed him I would notify him if those came back abnormal. No other orders at this time  0805-   0827- ABG abnormal. pH 7.06, pCO2 7, Glu 535. Pt actively hallucinating, grabbing at things that aren't there, incoherent. Dr. Estrada notified. Transfer to SDU ordered as well as one time dose of 6 units of insulin  0830-Nursing supervisor notified of SDU transfer order. No beds available at this time.  0920- Nursing supervisor contacted again about decline in pt condition. BP dropped significantly 86/40. T 97.5 F. Still no bed available in SDU.   0923- RT at bedside attempting to get pulse ox on L hand. Fingers cold, Hot packs placed on finger to try to get accurate reading. Unable to get reading  0925- Nursing supervisor at bedside  0930- Dr. New at bedside reviewing labs  0945- rapid response RN at bedside   1000- pt transferred down to ICU . Report given to HILLARY Arredondo

## 2024-05-05 LAB
ALBUMIN SERPL BCP-MCNC: 2.9 G/DL (ref 3.4–5)
ANION GAP SERPL CALC-SCNC: 20 MMOL/L (ref 10–20)
BACTERIA BLD AEROBE CULT: ABNORMAL
BACTERIA BLD CULT: ABNORMAL
BUN SERPL-MCNC: 69 MG/DL (ref 6–23)
CALCIUM SERPL-MCNC: 9 MG/DL (ref 8.6–10.3)
CHLORIDE SERPL-SCNC: 98 MMOL/L (ref 98–107)
CO2 SERPL-SCNC: 23 MMOL/L (ref 21–32)
CREAT SERPL-MCNC: 5.51 MG/DL (ref 0.5–1.3)
EGFRCR SERPLBLD CKD-EPI 2021: 13 ML/MIN/1.73M*2
ERYTHROCYTE [DISTWIDTH] IN BLOOD BY AUTOMATED COUNT: 18.2 % (ref 11.5–14.5)
GLUCOSE BLD MANUAL STRIP-MCNC: 115 MG/DL (ref 74–99)
GLUCOSE BLD MANUAL STRIP-MCNC: 121 MG/DL (ref 74–99)
GLUCOSE BLD MANUAL STRIP-MCNC: 127 MG/DL (ref 74–99)
GLUCOSE BLD MANUAL STRIP-MCNC: 138 MG/DL (ref 74–99)
GLUCOSE BLD MANUAL STRIP-MCNC: 140 MG/DL (ref 74–99)
GLUCOSE BLD MANUAL STRIP-MCNC: 145 MG/DL (ref 74–99)
GLUCOSE BLD MANUAL STRIP-MCNC: 146 MG/DL (ref 74–99)
GLUCOSE BLD MANUAL STRIP-MCNC: 147 MG/DL (ref 74–99)
GLUCOSE BLD MANUAL STRIP-MCNC: 149 MG/DL (ref 74–99)
GLUCOSE BLD MANUAL STRIP-MCNC: 150 MG/DL (ref 74–99)
GLUCOSE BLD MANUAL STRIP-MCNC: 163 MG/DL (ref 74–99)
GLUCOSE BLD MANUAL STRIP-MCNC: 178 MG/DL (ref 74–99)
GLUCOSE BLD MANUAL STRIP-MCNC: 205 MG/DL (ref 74–99)
GLUCOSE SERPL-MCNC: 167 MG/DL (ref 74–99)
GRAM STN SPEC: ABNORMAL
GRAM STN SPEC: ABNORMAL
HCT VFR BLD AUTO: 28.9 % (ref 41–52)
HGB BLD-MCNC: 9.7 G/DL (ref 13.5–17.5)
MAGNESIUM SERPL-MCNC: 2 MG/DL (ref 1.6–2.4)
MCH RBC QN AUTO: 27.6 PG (ref 26–34)
MCHC RBC AUTO-ENTMCNC: 33.6 G/DL (ref 32–36)
MCV RBC AUTO: 82 FL (ref 80–100)
NRBC BLD-RTO: 0 /100 WBCS (ref 0–0)
PHOSPHATE SERPL-MCNC: 4.4 MG/DL (ref 2.5–4.9)
PLATELET # BLD AUTO: 205 X10*3/UL (ref 150–450)
POTASSIUM SERPL-SCNC: 4.1 MMOL/L (ref 3.5–5.3)
RBC # BLD AUTO: 3.51 X10*6/UL (ref 4.5–5.9)
SODIUM SERPL-SCNC: 137 MMOL/L (ref 136–145)
WBC # BLD AUTO: 13 X10*3/UL (ref 4.4–11.3)

## 2024-05-05 PROCEDURE — 87040 BLOOD CULTURE FOR BACTERIA: CPT | Mod: 91,AHULAB | Performed by: SURGERY

## 2024-05-05 PROCEDURE — 2500000002 HC RX 250 W HCPCS SELF ADMINISTERED DRUGS (ALT 637 FOR MEDICARE OP, ALT 636 FOR OP/ED): Performed by: INTERNAL MEDICINE

## 2024-05-05 PROCEDURE — 80069 RENAL FUNCTION PANEL: CPT

## 2024-05-05 PROCEDURE — 99291 CRITICAL CARE FIRST HOUR: CPT | Performed by: SURGERY

## 2024-05-05 PROCEDURE — 2500000005 HC RX 250 GENERAL PHARMACY W/O HCPCS: Performed by: SURGERY

## 2024-05-05 PROCEDURE — 36415 COLL VENOUS BLD VENIPUNCTURE: CPT

## 2024-05-05 PROCEDURE — 83735 ASSAY OF MAGNESIUM: CPT

## 2024-05-05 PROCEDURE — 2060000001 HC INTERMEDIATE ICU ROOM DAILY

## 2024-05-05 PROCEDURE — 2500000004 HC RX 250 GENERAL PHARMACY W/ HCPCS (ALT 636 FOR OP/ED)

## 2024-05-05 PROCEDURE — 2500000006 HC RX 250 W HCPCS SELF ADMINISTERED DRUGS (ALT 637 FOR ALL PAYERS): Performed by: NURSE PRACTITIONER

## 2024-05-05 PROCEDURE — 82947 ASSAY GLUCOSE BLOOD QUANT: CPT

## 2024-05-05 PROCEDURE — 2500000004 HC RX 250 GENERAL PHARMACY W/ HCPCS (ALT 636 FOR OP/ED): Performed by: SURGERY

## 2024-05-05 PROCEDURE — 99231 SBSQ HOSP IP/OBS SF/LOW 25: CPT | Performed by: INTERNAL MEDICINE

## 2024-05-05 PROCEDURE — C9113 INJ PANTOPRAZOLE SODIUM, VIA: HCPCS

## 2024-05-05 PROCEDURE — 85027 COMPLETE CBC AUTOMATED: CPT

## 2024-05-05 PROCEDURE — 2500000001 HC RX 250 WO HCPCS SELF ADMINISTERED DRUGS (ALT 637 FOR MEDICARE OP): Performed by: NURSE PRACTITIONER

## 2024-05-05 PROCEDURE — 2500000002 HC RX 250 W HCPCS SELF ADMINISTERED DRUGS (ALT 637 FOR MEDICARE OP, ALT 636 FOR OP/ED): Performed by: SURGERY

## 2024-05-05 PROCEDURE — 36415 COLL VENOUS BLD VENIPUNCTURE: CPT | Performed by: SURGERY

## 2024-05-05 RX ORDER — INSULIN GLARGINE 100 [IU]/ML
10 INJECTION, SOLUTION SUBCUTANEOUS EVERY 24 HOURS
Status: DISCONTINUED | OUTPATIENT
Start: 2024-05-05 | End: 2024-05-08

## 2024-05-05 RX ORDER — INSULIN LISPRO 100 [IU]/ML
0-5 INJECTION, SOLUTION INTRAVENOUS; SUBCUTANEOUS
Status: DISCONTINUED | OUTPATIENT
Start: 2024-05-05 | End: 2024-05-26 | Stop reason: HOSPADM

## 2024-05-05 RX ORDER — DEXTROSE 50 % IN WATER (D50W) INTRAVENOUS SYRINGE
25
Status: DISCONTINUED | OUTPATIENT
Start: 2024-05-05 | End: 2024-05-26 | Stop reason: HOSPADM

## 2024-05-05 RX ADMIN — INSULIN LISPRO 1 UNITS: 100 INJECTION, SOLUTION INTRAVENOUS; SUBCUTANEOUS at 16:44

## 2024-05-05 RX ADMIN — PANTOPRAZOLE SODIUM 40 MG: 40 INJECTION, POWDER, FOR SOLUTION INTRAVENOUS at 20:19

## 2024-05-05 RX ADMIN — LEVOTHYROXINE SODIUM 125 MCG: 125 TABLET ORAL at 06:21

## 2024-05-05 RX ADMIN — SODIUM CHLORIDE, SODIUM LACTATE, POTASSIUM CHLORIDE, CALCIUM CHLORIDE AND DEXTROSE MONOHYDRATE 100 ML/HR: 5; 600; 310; 30; 20 INJECTION, SOLUTION INTRAVENOUS at 03:05

## 2024-05-05 RX ADMIN — INSULIN LISPRO 1 UNITS: 100 INJECTION, SOLUTION INTRAVENOUS; SUBCUTANEOUS at 13:30

## 2024-05-05 RX ADMIN — SODIUM CHLORIDE, POTASSIUM CHLORIDE, SODIUM LACTATE AND CALCIUM CHLORIDE 500 ML: 600; 310; 30; 20 INJECTION, SOLUTION INTRAVENOUS at 13:52

## 2024-05-05 RX ADMIN — INSULIN GLARGINE 10 UNITS: 100 INJECTION, SOLUTION SUBCUTANEOUS at 09:04

## 2024-05-05 RX ADMIN — PANTOPRAZOLE SODIUM 40 MG: 40 INJECTION, POWDER, FOR SOLUTION INTRAVENOUS at 08:45

## 2024-05-05 RX ADMIN — Medication 2 L/MIN: at 08:00

## 2024-05-05 RX ADMIN — SEVELAMER CARBONATE 2400 MG: 800 TABLET, FILM COATED ORAL at 16:44

## 2024-05-05 RX ADMIN — OXYCODONE HYDROCHLORIDE 5 MG: 5 TABLET ORAL at 22:03

## 2024-05-05 ASSESSMENT — PAIN - FUNCTIONAL ASSESSMENT
PAIN_FUNCTIONAL_ASSESSMENT: 0-10
PAIN_FUNCTIONAL_ASSESSMENT: CPOT (CRITICAL CARE PAIN OBSERVATION TOOL)
PAIN_FUNCTIONAL_ASSESSMENT: CPOT (CRITICAL CARE PAIN OBSERVATION TOOL)
PAIN_FUNCTIONAL_ASSESSMENT: 0-10

## 2024-05-05 ASSESSMENT — PAIN SCALES - GENERAL
PAINLEVEL_OUTOF10: 0 - NO PAIN
PAINLEVEL_OUTOF10: 0 - NO PAIN
PAINLEVEL_OUTOF10: 7
PAINLEVEL_OUTOF10: 0 - NO PAIN
PAINLEVEL_OUTOF10: 0 - NO PAIN

## 2024-05-05 NOTE — PROGRESS NOTES
"Edwar Hemphill is a 39 y.o. male on day 5 of admission presenting with SIRS (systemic inflammatory response syndrome) (Multi).    Subjective   Patient awake  No current complaint  He does not know his home glargine dose     Scheduled medications  [Held by provider] apixaban, 5 mg, oral, BID  atorvastatin, 40 mg, oral, Nightly  B complex-vitamin C-folic acid, 1 capsule, oral, Daily  heparin, 3,100 Units, intra-catheter, After Dialysis  heparin, 3,100 Units, intra-catheter, After Dialysis  levothyroxine, 125 mcg, oral, Daily before breakfast  oxygen, , inhalation, Continuous - Inhalation  pantoprazole, 40 mg, intravenous, BID  polyethylene glycol, 17 g, oral, Daily  sennosides, 8.6 mg, oral, BID  sevelamer carbonate, 2,400 mg, oral, TID with meals      Continuous medications  dextrose 5 % and lactated Ringer's, 100 mL/hr, Last Rate: 100 mL/hr (05/05/24 0700)  insulin regular infusion, 0-20 Units/hr, Last Rate: Stopped (05/05/24 0800)  norepinephrine, 0.01-0.5 mcg/kg/min, Last Rate: 0.06 mcg/kg/min (05/05/24 0700)  oxygen, , Last Rate: 2 L/min (05/03/24 1758)        Objective   Physical Exam  Constitutional:       General: He is not in acute distress.  Neurological:      Mental Status: He is alert.         Last Recorded Vitals  Blood pressure 113/77, pulse 109, temperature 36.1 °C (97 °F), resp. rate 16, height 1.753 m (5' 9\"), weight 70.5 kg (155 lb 6.8 oz), SpO2 100%.  Intake/Output last 3 Shifts:  I/O last 3 completed shifts:  In: 1990.7 (28.2 mL/kg) [P.O.:180; I.V.:1510.7 (21.4 mL/kg); IV Piggyback:300]  Out: - (0 mL/kg)   Weight: 70.5 kg     Relevant Results  Results from last 7 days   Lab Units 05/05/24  0751 05/05/24  0653 05/05/24  0613 05/05/24  0516 05/05/24  0423 05/05/24  0405 05/04/24  1956 05/04/24  1918 05/03/24  1612 05/03/24  1442 04/30/24  1842 04/30/24  1345   POCT GLUCOSE mg/dL 115* 145* 138* 146*  --  140*   < >  --    < >  --    < >  --    GLUCOSE mg/dL  --   --   --   --  167*  --   --  174*  --  " 243*  --  241*    < > = values in this interval not displayed.      Latest Reference Range & Units 05/04/24 19:18 05/05/24 04:23   GLUCOSE 74 - 99 mg/dL 174 (H) 167 (H)   SODIUM 136 - 145 mmol/L 138 137   POTASSIUM 3.5 - 5.3 mmol/L 3.6 4.1   CHLORIDE 98 - 107 mmol/L 93 (L) 98   Bicarbonate 21 - 32 mmol/L 14 (L) 23   Anion Gap 10 - 20 mmol/L 35 (H) 20   Blood Urea Nitrogen 6 - 23 mg/dL 50 (H) 69 (H)   Creatinine 0.50 - 1.30 mg/dL 5.32 (H) 5.51 (H)   EGFR >60 mL/min/1.73m*2 13 (L) 13 (L)   Calcium 8.6 - 10.3 mg/dL 9.0 9.0   PHOSPHORUS 2.5 - 4.9 mg/dL 4.7 4.4   Albumin 3.4 - 5.0 g/dL 3.3 (L) 2.9 (L)      Latest Reference Range & Units 05/03/24 14:42   Beta-Hydroxybutyrate 0.02 - 0.27 mmol/L 9.61 (H)       Assessment/Plan   Principal Problem:    SIRS (systemic inflammatory response syndrome) (Multi)    IMPRESSION  DIABETIC KETOACIDOSIS, RESOLVED  TYPE 2 DIABETES MELLITUS  LONG TERM CURRENT INSULIN USE  Anion gap acidosis and hyperglycemia, present on admission  DKA physiology confirmed by beta hydroxybutyrate prior to ICU transfer  DKA resolved and glucose corrected  Historically he has a low basal insulin requirement        RECOMMENDATIONS  Insulin glargine 10 units QAM  Discontinue IV insulin 3h after glargine dose today  Will start lispro scale      Mike Santana MD

## 2024-05-05 NOTE — PROGRESS NOTES
Edwar Hemphill is a 39 y.o. male on day 5 of admission presenting with SIRS (systemic inflammatory response syndrome) (Multi).      Subjective   Patient seen and was evaluated and examined in stepdown, remarkably better than yesterday he is awake and alert today.       Objective        Vitals 24HR  Heart Rate:  [101-126]   Temp:  [36.1 °C (97 °F)-37.1 °C (98.8 °F)]   Resp:  [11-32]   BP: ()/(39-84)   Weight:  [65.8 kg (145 lb 1 oz)-70.5 kg (155 lb 6.8 oz)]   SpO2:  [90 %-100 %]     Intake/Output last 3 Shifts:    Intake/Output Summary (Last 24 hours) at 5/5/2024 1152  Last data filed at 5/5/2024 0700  Gross per 24 hour   Intake 1748.68 ml   Output --   Net 1748.68 ml       Physical Exam          General appearance: arousable doing better with no complaints or shortness of breath  Head and ENT: Normocephalic/atraumatic/PERRL neck/ABD  Lungs; CTA  Heart: RRR  Dialysis access is right femoral tunneled dialysis catheter  Extremities: Right upper extremity amputation, left below-knee amputation.  Neurologic: Physiologic          Relevant Results     Results from last 7 days   Lab Units 05/05/24 0423 05/04/24 1918 05/03/24  1442   WBC AUTO x10*3/uL 13.0* 19.3* 9.9   HEMOGLOBIN g/dL 9.7* 11.1* 10.6*   HEMATOCRIT % 28.9* 34.7* 31.9*   PLATELETS AUTO x10*3/uL 205 212 130*      Results from last 7 days   Lab Units 05/05/24 0423 05/04/24 1918 05/03/24  1442   SODIUM mmol/L 137 138 135*   POTASSIUM mmol/L 4.1 3.6 4.5   CHLORIDE mmol/L 98 93* 93*   CO2 mmol/L 23 14* 12*   BUN mg/dL 69* 50* 25*   CREATININE mg/dL 5.51* 5.32* 3.78*   GLUCOSE mg/dL 167* 174* 243*   CALCIUM mg/dL 9.0 9.0 9.4        Current Facility-Administered Medications:     [Held by provider] apixaban (Eliquis) tablet 5 mg, 5 mg, oral, BID, OSBALDO Wilson, DNP, 5 mg at 05/03/24 2156    atorvastatin (Lipitor) tablet 40 mg, 40 mg, oral, Nightly, OSBALDO Wilson, DNP, 40 mg at 05/03/24 2156    B complex-vitamin C-folic acid  (Nephrocaps) capsule 1 capsule, 1 capsule, oral, Daily, OSBALDO Wilson, DNP, 1 capsule at 05/02/24 0854    dextrose 5 % and lactated Ringer's infusion, 100 mL/hr, intravenous, Continuous, Veronica De La Paz MD, Last Rate: 100 mL/hr at 05/05/24 0700, 100 mL/hr at 05/05/24 0700    dextrose 50 % injection 12.5 g, 12.5 g, intravenous, q15 min PRN, Veronica De La Paz MD    dextrose 50 % injection 25 g, 25 g, intravenous, q15 min PRN, Mike Santana MD    fentaNYL PF (Sublimaze) injection, , , PRN, Ronaldo Dempsey MD, 50 mcg at 05/03/24 1816    glucagon (Glucagen) injection 1 mg, 1 mg, intramuscular, q15 min PRN, Veronica De La Paz MD    heparin 1,000 unit/mL injection 3,100 Units, 3,100 Units, intra-catheter, After Dialysis, OSBALDO Wilson DNP, 3,100 Units at 05/03/24 1210    heparin 1,000 unit/mL injection 3,100 Units, 3,100 Units, intra-catheter, After Dialysis, OSBALDO Wilson DNP, 3,100 Units at 05/03/24 1210    heparin 1,000 unit/mL injection, , , PRN, Ronaldo Dempsey MD, 2,400 Units at 05/03/24 1824    insulin glargine (Lantus) injection 10 Units, 10 Units, subcutaneous, q24h, Mike Santana MD, 10 Units at 05/05/24 0904    insulin regular (HumuLIN, NovoLIN) bolus from bag 2-6 Units, 2-6 Units, intravenous, PRN, Veronica De La Paz MD    insulin regular 100 unit/100 mL (1 unit/mL) in 0.9 % NaCl infusion, 0-20 Units/hr, intravenous, Continuous, Veronica De La Paz MD, Stopped at 05/05/24 0800    levothyroxine (Synthroid, Levoxyl) tablet 125 mcg, 125 mcg, oral, Daily before breakfast, OSBALDO Wilson DNP, 125 mcg at 05/05/24 0621    lidocaine PF (Xylocaine) 10 mg/mL (1 %) injection, , , PRN, Ronaldo Dempsey MD, 5 mL at 05/03/24 1816    loperamide (Imodium) capsule 2 mg, 2 mg, oral, q4h PRN, OSBALDO Wilson DNP    midazolam (Versed) injection, , , PRN, Ronaldo Dempsey MD, 1 mg at 05/03/24 1816    nitroglycerin (Nitrostat) SL tablet 0.4  mg, 0.4 mg, sublingual, q5 min PRN, Andi Pearceritomasa, APRN-CNP, DNP    norepinephrine (Levophed) 8 mg in dextrose 5% 250 mL (0.032 mg/mL) infusion (premix), 0.01-0.5 mcg/kg/min, intravenous, Continuous, Veronica De La Paz MD, Last Rate: 4.94 mL/hr at 05/05/24 1030, 0.04 mcg/kg/min at 05/05/24 1030    ondansetron (Zofran) injection 4 mg, 4 mg, intravenous, q6h PRN, Andi Moellertaritomasa, APRN-CNP, DNP    oxyCODONE (Roxicodone) immediate release tablet 5 mg, 5 mg, oral, q4h PRN, Andi Moellertaritomasa, APRN-CNP, DNP, 5 mg at 05/03/24 1029    oxygen (O2) therapy, , , Continuous PRN, Ronaldo Dempsey MD, Last Rate: 120,000 mL/hr at 05/03/24 1758, 2 L/min at 05/03/24 1758    oxygen (O2) therapy, , inhalation, Continuous - Inhalation, Veronica De La Paz MD, 2 L/min at 05/05/24 0800    pantoprazole (ProtoNix) injection 40 mg, 40 mg, intravenous, BID, Shannan Schwartz, APRN-CNP, 40 mg at 05/05/24 0845    polyethylene glycol (Glycolax, Miralax) packet 17 g, 17 g, oral, Daily, Andi Moellertaritomasa, APRN-CNP, DNP, 17 g at 05/04/24 0624    sennosides (Senokot) tablet 8.6 mg, 8.6 mg, oral, BID, Andi Moellertarian, APRN-CNP, DNP, 8.6 mg at 05/03/24 2156    sevelamer carbonate (Renvela) tablet 2,400 mg, 2,400 mg, oral, TID with meals, Andi A Miktarian, APRN-CNP, DNP, 2,400 mg at 05/02/24 1150    simethicone (Mylicon) chewable tablet 80 mg, 80 mg, oral, q8h PRN, OSBALDO Wilson DNP    vancomycin (Vancocin) pharmacy to dose - pharmacy monitoring, , miscellaneous, Daily PRN, OSBALDO Wilson DNP           Assessment/Plan          1.  ESKD on hemodialysis with last hemodialysis being on 5/2 due to hyperkalemia  Will schedule for hemodialysis tomorrow Monday on 3K bath  2.  Diabetes mellitus type 2, continue current management  3.  Exhausted all vascular access   4.  Multiple infected dialysis catheters exchange and removal with ID following closely.  5.  Metabolic bone disease due to CKD, will follow phosphate binders  and vitamin D products as needed.      Will round patient daily evaluating labs and clinical status with all other consultants.       Principal Problem:    SIRS (systemic inflammatory response syndrome) (Multi)              I spent 35 minutes in the professional and overall care of this patient.      Gracie Hollins MD

## 2024-05-05 NOTE — PROGRESS NOTES
"Edwar Hemphill is a 39 y.o. male on day 5 of admission presenting with SIRS (systemic inflammatory response syndrome) (Multi).    Subjective   Awake and alert today. No recollection of events of yesterday. Transitioned off insulin gtt to lantus and SSI this morning.       Objective     Physical Exam  HENT:      Nose: Nose normal.      Mouth/Throat:      Mouth: Mucous membranes are moist.   Eyes:      Pupils: Pupils are equal, round, and reactive to light.   Cardiovascular:      Rate and Rhythm: Normal rate and regular rhythm.   Pulmonary:      Effort: Pulmonary effort is normal.      Breath sounds: No wheezing or rhonchi.   Abdominal:      General: There is no distension.      Palpations: Abdomen is soft.      Tenderness: There is no abdominal tenderness.   Musculoskeletal:         General: No swelling or deformity. Normal range of motion.      Comments: RUE amputation  LLE AKA  R femoral dialysis catheter site C/D   Skin:     General: Skin is warm.   Neurological:      General: No focal deficit present.      Mental Status: He is alert. Mental status is at baseline.   Psychiatric:         Mood and Affect: Mood normal.         Last Recorded Vitals  Blood pressure 124/80, pulse (!) 112, temperature 36.1 °C (97 °F), resp. rate 16, height 1.753 m (5' 9\"), weight 70.5 kg (155 lb 6.8 oz), SpO2 100%.  Intake/Output last 3 Shifts:  I/O last 3 completed shifts:  In: 1990.7 (28.2 mL/kg) [P.O.:180; I.V.:1510.7 (21.4 mL/kg); IV Piggyback:300]  Out: - (0 mL/kg)   Weight: 70.5 kg                Assessment/Plan   Principal Problem:    SIRS (systemic inflammatory response syndrome) (Multi)      Pt is a 38yo male with a chronic hx of dialysis non-compliance, recurrent bactermia episodes, and multiple access issues for HD presenting with MRSA bactermia and transferred to ICU yesterday with severe DKA. Glucose, AG and mental status improved today. Repeat BC pending.      Plan:  Neuro/Psych/Pain Ctrl/Sedation:  #altered mental " status  -metabolic encephalopathy due to hyperglycemia and electrolyte disturbances  -cont to monitor      Respiratory/ENT:  #metabolic acidosis with incomplete respiratory compensation  -breathing pattern has improved since pt has arrived  -monitor for now, no indication for intubation        Cardiovascular:  #MRSA bactermia--TTE without evidence of vegetations     Renal/Volume Status:  #ESRD on HD  -rec'd HD on Tue and Thurs  #difficult HD access  -right femoral vein line exchanged Friday with IR     GI:  -start carb controlled diet     Infectious Disease:  #MRSA bactermia  -vancomycin, pharmacy dosing  -repeat BC     Heme/Onc:  #anemia of chronic disease  -no indication for transfusion     Endocrine:  #DKA  -lantus 10units, transition off insulin gtt to SSI  -endocrine following, appreciate guidance     Activity:  -as tolerates once mental status more clear     Ethics/Code Status:  Full code        :  DVT Prophylaxis: on eliquis  GI Prophylaxis: na  Bowel Regimen: na  Diet: npo  CVC: na  Levant: na  Castanon: anuric  Restraints: na  Dispo: icu, once off norepi gtt will be stable for txfr to RNF        This critically ill patient continues to be at-risk for clinically significant deterioration / failure due to the above mentioned dysfunctional, unstable organ systems.  I have personally identified and managed all complex critical care issues to prevent aforementioned clinical deterioration.  Critical care time is spent at bedside and/or the immediate area and has included, but is not limited to, the review of diagnostic tests, labs, radiographs, serial assessments of hemodynamics, respiratory status, ventilatory management, and family updates.  Time spent in procedures and teaching are reported separately.      Critical Care Time:  90 minutes     Veronica De La Paz MD              I spent 45 minutes in the professional and overall care of this patient.      Veronica De La Paz MD

## 2024-05-05 NOTE — PROGRESS NOTES
Care Coordinator Note:    Plan: patient transferred to ICU for DKA. On insuling gtt. Endocrine following. Echo done. POLI on hold. ID following - iv atb.  NMR for dc this weekend  Status: inpatient  Payor:  Disposition: [facility] with  MWF  Barrier: insulin gtt  ADOD: early-mid week    Era Ram [facility]      05/05/24 0932   Discharge Planning   Patient expects to be discharged to: [facility]

## 2024-05-06 ENCOUNTER — APPOINTMENT (OUTPATIENT)
Dept: DIALYSIS | Facility: HOSPITAL | Age: 39
End: 2024-05-06
Payer: COMMERCIAL

## 2024-05-06 LAB
ALBUMIN SERPL BCP-MCNC: 2.9 G/DL (ref 3.4–5)
ANION GAP SERPL CALC-SCNC: 18 MMOL/L (ref 10–20)
BUN SERPL-MCNC: 82 MG/DL (ref 6–23)
CALCIUM SERPL-MCNC: 9.7 MG/DL (ref 8.6–10.3)
CHLORIDE SERPL-SCNC: 96 MMOL/L (ref 98–107)
CO2 SERPL-SCNC: 26 MMOL/L (ref 21–32)
CREAT SERPL-MCNC: 6.35 MG/DL (ref 0.5–1.3)
EGFRCR SERPLBLD CKD-EPI 2021: 11 ML/MIN/1.73M*2
ERYTHROCYTE [DISTWIDTH] IN BLOOD BY AUTOMATED COUNT: 18.5 % (ref 11.5–14.5)
GLUCOSE BLD MANUAL STRIP-MCNC: 119 MG/DL (ref 74–99)
GLUCOSE BLD MANUAL STRIP-MCNC: 136 MG/DL (ref 74–99)
GLUCOSE BLD MANUAL STRIP-MCNC: 147 MG/DL (ref 74–99)
GLUCOSE BLD MANUAL STRIP-MCNC: 75 MG/DL (ref 74–99)
GLUCOSE SERPL-MCNC: 142 MG/DL (ref 74–99)
HBV SURFACE AB SER-ACNC: <3.1 MIU/ML
HBV SURFACE AG SERPL QL IA: NONREACTIVE
HCT VFR BLD AUTO: 28.6 % (ref 41–52)
HGB BLD-MCNC: 9.6 G/DL (ref 13.5–17.5)
MAGNESIUM SERPL-MCNC: 2.1 MG/DL (ref 1.6–2.4)
MCH RBC QN AUTO: 28.2 PG (ref 26–34)
MCHC RBC AUTO-ENTMCNC: 33.6 G/DL (ref 32–36)
MCV RBC AUTO: 84 FL (ref 80–100)
MRSA DNA SPEC QL NAA+PROBE: DETECTED
NRBC BLD-RTO: 0 /100 WBCS (ref 0–0)
PHOSPHATE SERPL-MCNC: 4.9 MG/DL (ref 2.5–4.9)
PLATELET # BLD AUTO: 176 X10*3/UL (ref 150–450)
POTASSIUM SERPL-SCNC: 4.4 MMOL/L (ref 3.5–5.3)
RBC # BLD AUTO: 3.4 X10*6/UL (ref 4.5–5.9)
SODIUM SERPL-SCNC: 136 MMOL/L (ref 136–145)
VANCOMYCIN SERPL-MCNC: 23.6 UG/ML (ref 5–20)
WBC # BLD AUTO: 9.4 X10*3/UL (ref 4.4–11.3)

## 2024-05-06 PROCEDURE — 99231 SBSQ HOSP IP/OBS SF/LOW 25: CPT | Performed by: INTERNAL MEDICINE

## 2024-05-06 PROCEDURE — 36415 COLL VENOUS BLD VENIPUNCTURE: CPT

## 2024-05-06 PROCEDURE — 2500000004 HC RX 250 GENERAL PHARMACY W/ HCPCS (ALT 636 FOR OP/ED): Performed by: NURSE PRACTITIONER

## 2024-05-06 PROCEDURE — 87340 HEPATITIS B SURFACE AG IA: CPT | Mod: AHULAB | Performed by: INTERNAL MEDICINE

## 2024-05-06 PROCEDURE — 8010000001 HC DIALYSIS - HEMODIALYSIS PER DAY

## 2024-05-06 PROCEDURE — 87641 MR-STAPH DNA AMP PROBE: CPT | Performed by: INTERNAL MEDICINE

## 2024-05-06 PROCEDURE — 80069 RENAL FUNCTION PANEL: CPT

## 2024-05-06 PROCEDURE — C9113 INJ PANTOPRAZOLE SODIUM, VIA: HCPCS

## 2024-05-06 PROCEDURE — 80202 ASSAY OF VANCOMYCIN: CPT | Performed by: NURSE PRACTITIONER

## 2024-05-06 PROCEDURE — 2500000004 HC RX 250 GENERAL PHARMACY W/ HCPCS (ALT 636 FOR OP/ED)

## 2024-05-06 PROCEDURE — 2500000002 HC RX 250 W HCPCS SELF ADMINISTERED DRUGS (ALT 637 FOR MEDICARE OP, ALT 636 FOR OP/ED): Performed by: INTERNAL MEDICINE

## 2024-05-06 PROCEDURE — 2500000001 HC RX 250 WO HCPCS SELF ADMINISTERED DRUGS (ALT 637 FOR MEDICARE OP): Performed by: FAMILY MEDICINE

## 2024-05-06 PROCEDURE — 2060000001 HC INTERMEDIATE ICU ROOM DAILY

## 2024-05-06 PROCEDURE — 85027 COMPLETE CBC AUTOMATED: CPT

## 2024-05-06 PROCEDURE — 83735 ASSAY OF MAGNESIUM: CPT

## 2024-05-06 PROCEDURE — 2500000004 HC RX 250 GENERAL PHARMACY W/ HCPCS (ALT 636 FOR OP/ED): Performed by: INTERNAL MEDICINE

## 2024-05-06 PROCEDURE — 6350000001 HC RX 635 EPOETIN >10,000 UNITS: Mod: JW | Performed by: INTERNAL MEDICINE

## 2024-05-06 PROCEDURE — 2500000006 HC RX 250 W HCPCS SELF ADMINISTERED DRUGS (ALT 637 FOR ALL PAYERS): Performed by: NURSE PRACTITIONER

## 2024-05-06 PROCEDURE — 2500000001 HC RX 250 WO HCPCS SELF ADMINISTERED DRUGS (ALT 637 FOR MEDICARE OP): Performed by: NURSE PRACTITIONER

## 2024-05-06 PROCEDURE — 82947 ASSAY GLUCOSE BLOOD QUANT: CPT

## 2024-05-06 PROCEDURE — 86706 HEP B SURFACE ANTIBODY: CPT | Mod: AHULAB | Performed by: INTERNAL MEDICINE

## 2024-05-06 RX ORDER — VANCOMYCIN HYDROCHLORIDE 1 G/20ML
INJECTION, POWDER, LYOPHILIZED, FOR SOLUTION INTRAVENOUS DAILY PRN
Status: DISCONTINUED | OUTPATIENT
Start: 2024-05-06 | End: 2024-05-06 | Stop reason: SDUPTHER

## 2024-05-06 RX ORDER — VANCOMYCIN HYDROCHLORIDE 1 G/200ML
15 INJECTION, SOLUTION INTRAVENOUS ONCE
Status: COMPLETED | OUTPATIENT
Start: 2024-05-06 | End: 2024-05-06

## 2024-05-06 RX ORDER — CYCLOBENZAPRINE HCL 5 MG
5 TABLET ORAL 3 TIMES DAILY PRN
Status: DISCONTINUED | OUTPATIENT
Start: 2024-05-06 | End: 2024-05-26 | Stop reason: HOSPADM

## 2024-05-06 RX ADMIN — PANTOPRAZOLE SODIUM 40 MG: 40 INJECTION, POWDER, FOR SOLUTION INTRAVENOUS at 21:29

## 2024-05-06 RX ADMIN — OXYCODONE HYDROCHLORIDE 5 MG: 5 TABLET ORAL at 12:40

## 2024-05-06 RX ADMIN — OXYCODONE HYDROCHLORIDE 5 MG: 5 TABLET ORAL at 08:23

## 2024-05-06 RX ADMIN — SENNOSIDES 8.6 MG: 8.6 TABLET, FILM COATED ORAL at 08:24

## 2024-05-06 RX ADMIN — PANTOPRAZOLE SODIUM 40 MG: 40 INJECTION, POWDER, FOR SOLUTION INTRAVENOUS at 08:25

## 2024-05-06 RX ADMIN — HEPARIN SODIUM 3100 UNITS: 1000 INJECTION INTRAVENOUS; SUBCUTANEOUS at 10:29

## 2024-05-06 RX ADMIN — LEVOTHYROXINE SODIUM 125 MCG: 125 TABLET ORAL at 06:01

## 2024-05-06 RX ADMIN — HEPARIN SODIUM 3100 UNITS: 1000 INJECTION INTRAVENOUS; SUBCUTANEOUS at 10:30

## 2024-05-06 RX ADMIN — VANCOMYCIN HYDROCHLORIDE 1000 MG: 1 INJECTION, SOLUTION INTRAVENOUS at 21:29

## 2024-05-06 RX ADMIN — ATORVASTATIN CALCIUM 40 MG: 40 TABLET, FILM COATED ORAL at 21:28

## 2024-05-06 RX ADMIN — CYCLOBENZAPRINE HYDROCHLORIDE 5 MG: 5 TABLET, FILM COATED ORAL at 14:55

## 2024-05-06 RX ADMIN — NEPHROCAP 1 CAPSULE: 1 CAP ORAL at 08:25

## 2024-05-06 RX ADMIN — SEVELAMER CARBONATE 2400 MG: 800 TABLET, FILM COATED ORAL at 08:24

## 2024-05-06 RX ADMIN — OXYCODONE HYDROCHLORIDE 5 MG: 5 TABLET ORAL at 21:28

## 2024-05-06 RX ADMIN — EPOETIN ALFA-EPBX 8000 UNITS: 10000 INJECTION, SOLUTION INTRAVENOUS; SUBCUTANEOUS at 18:18

## 2024-05-06 RX ADMIN — INSULIN GLARGINE 10 UNITS: 100 INJECTION, SOLUTION SUBCUTANEOUS at 08:29

## 2024-05-06 ASSESSMENT — PAIN SCALES - GENERAL
PAINLEVEL_OUTOF10: 0 - NO PAIN
PAINLEVEL_OUTOF10: 6
PAINLEVEL_OUTOF10: 8
PAINLEVEL_OUTOF10: 10 - WORST POSSIBLE PAIN
PAINLEVEL_OUTOF10: 0 - NO PAIN
PAINLEVEL_OUTOF10: 6
PAINLEVEL_OUTOF10: 6

## 2024-05-06 ASSESSMENT — PAIN - FUNCTIONAL ASSESSMENT
PAIN_FUNCTIONAL_ASSESSMENT: 0-10

## 2024-05-06 ASSESSMENT — PAIN DESCRIPTION - LOCATION
LOCATION: BACK
LOCATION: BACK

## 2024-05-06 ASSESSMENT — PAIN DESCRIPTION - DESCRIPTORS: DESCRIPTORS: DISCOMFORT

## 2024-05-06 NOTE — PROGRESS NOTES
"Vancomycin Dosing by Pharmacy- FOLLOW UP    Edwar Hemphill is a 39 y.o. male for whom Pharmacy has been consulted to dose vancomycin for other /bacteremia, line infection . Based on the patient's indication and renal status this patient is being dosed based on a goal pre-HD level of 15-25.     Renal function is currently stable (HD)    Current vancomycin dose: 1000 mg given every HD hours    Most recent random level: 23.6 mcg/mL    Visit Vitals  BP 85/55   Pulse 107   Temp 37 °C (98.6 °F) (Temporal)   Resp 20        Lab Results   Component Value Date    CREATININE 6.35 (H) 05/06/2024    CREATININE 5.51 (H) 05/05/2024    CREATININE 5.32 (H) 05/04/2024    CREATININE 3.78 (H) 05/03/2024        Patient weight is No results found for: \"PTWEIGHT\"    No results found for: \"CULTURE\"     I/O last 3 completed shifts:  In: 3693.8 (52.4 mL/kg) [P.O.:370; I.V.:2644.7 (37.5 mL/kg); IV Piggyback:679.2]  Out: - (0 mL/kg)   Weight: 70.5 kg   [unfilled]    Lab Results   Component Value Date    PATIENTTEMP 37.0 05/04/2024    PATIENTTEMP 37.0 01/14/2024    PATIENTTEMP 37.0 07/09/2023    PATIENTTEMP 37.0 07/08/2023    PATIENTTEMP 37.0 07/08/2023        Assessment/Plan    Within goal random/trough level.  Order placed for 1000 mg x1 to be given today at 2100 post HD.  The next level will be obtained on 5/8/24 at 0500. May be obtained sooner if clinically indicated.   Will continue to monitor renal function daily while on vancomycin and order serum creatinine at least every 48 hours if not already ordered.  Follow for continued vancomycin needs, clinical response, and signs/symptoms of toxicity.       Romana A Kuchta, PharmD           "

## 2024-05-06 NOTE — SIGNIFICANT EVENT
Patient transferred out of ICU overnight. Initial team prior to ICU transfer was Dr. Estrada. Have notified him and will change him back to his service.     Zacarias New DO, [facility]   5/6/2024

## 2024-05-06 NOTE — PROCEDURES
Patient seen on dialysis. F160 kidney x 3.25 hours, 3 k bath, BFR//500 ml/min, 2.5 Ca with a UF target set for 1 Kg. Patient remains bacteremic with MRSA despite guidewire exchange to R groin tunneled HD catheter on 5/3. Receiving vancomycin. ID is following.  I will order erythropoietin and he is on a phosphorus binder.  He is requesting to end dialysis early due to worsening back discomfort. UF of 400 mls currently.  MRI of the lumbar spine did not show osteomyelitis/discitis.  Nephrology will follow closely with his care.

## 2024-05-06 NOTE — PROGRESS NOTES
"Edwar Hemphill is a 39 y.o. male on day 6 of admission presenting with SIRS (systemic inflammatory response syndrome) (Multi).    Subjective   Awake and alert today. Does have back pain  Pt is boarding in the icu  Does have pain in back not controlled with current meds       Objective     Physical Exam  HENT:      Nose: Nose normal.      Mouth/Throat:      Mouth: Mucous membranes are moist.   Eyes:      Pupils: Pupils are equal, round, and reactive to light.   Cardiovascular:      Rate and Rhythm: Normal rate and regular rhythm.   Pulmonary:      Effort: Pulmonary effort is normal.      Breath sounds: No wheezing or rhonchi.   Abdominal:      General: There is no distension.      Palpations: Abdomen is soft.      Tenderness: There is no abdominal tenderness.   Musculoskeletal:         General: No swelling or deformity. Normal range of motion.      Comments: RUE amputation  LLE AKA  R femoral dialysis catheter site C/D   Skin:     General: Skin is warm.   Neurological:      General: No focal deficit present.      Mental Status: He is alert. Mental status is at baseline.   Psychiatric:         Mood and Affect: Mood normal.         Last Recorded Vitals  Blood pressure (!) 116/91, pulse (!) 114, temperature 37 °C (98.6 °F), temperature source Temporal, resp. rate 20, height 1.753 m (5' 9\"), weight 70.5 kg (155 lb 6.8 oz), SpO2 94%.  Intake/Output last 3 Shifts:  I/O last 3 completed shifts:  In: 3693.8 (52.4 mL/kg) [P.O.:370; I.V.:2644.7 (37.5 mL/kg); IV Piggyback:679.2]  Out: - (0 mL/kg)   Weight: 70.5 kg          Assessment/Plan   Principal Problem:    SIRS (systemic inflammatory response syndrome) (Multi)  Pt is a 38yo male with a chronic hx of dialysis non-compliance, recurrent bactermia episodes, and multiple access issues for HD presenting with MRSA bactermia and transferred to ICU yesterday with severe DKA. Glucose, AG and mental status improved today. Repeat BC pending.      Plan:  Neuro/Psych/Pain " Ctrl/Sedation:  #altered mental status  -metabolic encephalopathy due to hyperglycemia and electrolyte disturbances  -cont to monitor      Respiratory/ENT:  #metabolic acidosis with incomplete respiratory compensation  -breathing pattern has improved          Cardiovascular:  #MRSA bactermia--TTE without evidence of vegetations  ID following and repeating culture     Renal/Volume Status:  #ESRD on HD  -rec'd HD on Tue and Thurs  #difficult HD access  -right femoral vein line exchanged Friday with IR     GI:  -start carb controlled diet     Infectious Disease:  #MRSA bactermia  -vancomycin, pharmacy dosing  -repeat BC     Heme/Onc:  #anemia of chronic disease  -no indication for transfusion     Endocrine:  #DKA  -lantus 10units, transition off insulin gtt to SSI  -endocrine following, appreciate guidance  Glucose stable     Activity:  -as tolerates once mental status more clear     Ethics/Code Status:  Full code        :  DVT Prophylaxis: on eliquis  GI Prophylaxis: na  Bowel Regimen: na  Diet: npo  CVC: na  Judi: na  Castanon: anuric  Restraints: na  Dispo: icu, once off norepi gtt will be stable for txfr to RNF       will give him one dose of toradol for back pain  yamilal      El Estrada MD

## 2024-05-06 NOTE — PROGRESS NOTES
Report from Sending RN:    Report From: Gerardo Mcdonald  Recent Surgery of Procedure: No  Baseline Level of Consciousness (LOC): AOx4  Oxygen Use: No  Type: NA  Diabetic: Yes  Last BP Med Given Day of Dialysis: See EMAR  Last Pain Med Given: See EMAR  Lab Tests to be Obtained with Dialysis: Yes  Blood Transfusion to be Given During Dialysis: No  Available IV Access: Yes  Medications to be Administered During Dialysis: No  Continuous IV Infusion Running: No  Restraints on Currently or in the Last 24 Hours: No  Hand-Off Communication: Pt stable and ready for tx  Dialysis Catheter Dressing: C/D/I  Last Dressing Change: 5/03/2024

## 2024-05-06 NOTE — PROGRESS NOTES
"  INFECTIOUS DISEASE DAILY PROGRESS NOTE    SUBJECTIVE:    Blood cx from 5/5 still positive with relatively rapid growth. Still having back pain.    OBJECTIVE:  VITALS (Last 24 Hours)  /74   Pulse 107   Temp 37.1 °C (98.8 °F) (Temporal)   Resp 16   Ht 1.753 m (5' 9\")   Wt 70.5 kg (155 lb 6.8 oz)   SpO2 94%   BMI 22.95 kg/m²     PHYSICAL EXAM:  Gen - lethargic  Abd - soft, no ttp, BS present  RLE - s/p AKA  RUE - s/p amputation  Groin - femoral HD catheter present  Skin - no rash    ABX: IV Vancomycin    LABS:  Lab Results   Component Value Date    WBC 9.4 05/06/2024    HGB 9.6 (L) 05/06/2024    HCT 28.6 (L) 05/06/2024    MCV 84 05/06/2024     05/06/2024     Lab Results   Component Value Date    GLUCOSE 167 (H) 05/05/2024    CALCIUM 9.0 05/05/2024     05/05/2024    K 4.1 05/05/2024    CO2 23 05/05/2024    CL 98 05/05/2024    BUN 69 (H) 05/05/2024    CREATININE 5.51 (H) 05/05/2024     Results from last 72 hours   Lab Units 05/05/24  0423   ALBUMIN g/dL 2.9*     Estimated Creatinine Clearance: 17.8 mL/min (A) (by C-G formula based on SCr of 5.51 mg/dL (H)).    IMAGING:  MRI L-spine 5/3  IMPRESSION:  Diffusely hypointense bone marrow signal on the T1 weighted images  may be related to renal osteodystrophy. Differential considerations  include anemia, reactive bone marrow or bone marrow infiltrating  process.    No definite imaging findings to suggest discitis osteomyelitis on the  current exam.    Degenerative changes without significant spinal canal stenosis.  Varying degrees of mild neural foraminal narrowing as detailed above.      ASSESSMENT/PLAN:     CLABSI (HD cath POA) due to MRSA - line exchanged 5/3. Not having a full line holiday because of risk of losing HD access. TTE without endocarditis. Repeat blood cx 5/5 still positive.  Lumbar Spine Pain - CT without epidural abscess. MRI without OM/discitis.  DM II with DKA - improving    IV Vancomycin.    I will repeat blood cx again x2 " tomorrow AM and hopefully with more time will clear bacteremia. However if he is not we may need to consider again line holiday or exchanging the line again if this is not possible.    Monitoring for adverse effects of abx such as rash/itching/diarrhea - none apparent.    Will follow. Thanks! D/w Dr. Janis Jauregui MD  [facility]  Office #[phone]

## 2024-05-06 NOTE — SIGNIFICANT EVENT
Patient to be transferred to stepdown to Dr. Cox and the Hospitalists' service.  Case discussed with Dr. Cox

## 2024-05-06 NOTE — PROGRESS NOTES
Report to Receiving RN:    Report To: Gerardo Maurer  Time Report Called: 1030  Hand-Off Communication: pt took off 0.4 L of fluid, ending /75 , pt requesting to end tx early d/t back pain, MD Bruce in agreement  Complications During Treatment: No  Ultrafiltration Treatment: No  Medications Administered During Dialysis: No  Blood Products Administered During Dialysis: No  Labs Sent During Dialysis: Yes  Heparin Drip Rate Changes: No  Dialysis Catheter Dressing: C/D/I  Last Dressing Change: 5/03/2024    Electronic Signatures:  Audie Augustine RN (Signed )   Authored:    (Signed )   Authored:     Last Updated: 10:31 AM by AUDIE AUGUSTINE

## 2024-05-06 NOTE — PROGRESS NOTES
05/06/24 3110   Discharge Planning   Patient expects to be discharged to: [facility]         Patient was in ICU due to DKA and needing of insulin drip. He is off insulin drip now. He can transfer to med -surg floor. When patient is med ready will  go back to [facility].

## 2024-05-07 ENCOUNTER — TELEPHONE (OUTPATIENT)
Dept: TRANSPLANT | Facility: HOSPITAL | Age: 39
End: 2024-05-07
Payer: COMMERCIAL

## 2024-05-07 LAB
ALBUMIN SERPL BCP-MCNC: 2.8 G/DL (ref 3.4–5)
ANION GAP SERPL CALC-SCNC: 18 MMOL/L (ref 10–20)
BUN SERPL-MCNC: 56 MG/DL (ref 6–23)
CALCIUM SERPL-MCNC: 9 MG/DL (ref 8.6–10.3)
CHLORIDE SERPL-SCNC: 95 MMOL/L (ref 98–107)
CO2 SERPL-SCNC: 25 MMOL/L (ref 21–32)
CREAT SERPL-MCNC: 5.14 MG/DL (ref 0.5–1.3)
EGFRCR SERPLBLD CKD-EPI 2021: 14 ML/MIN/1.73M*2
ERYTHROCYTE [DISTWIDTH] IN BLOOD BY AUTOMATED COUNT: 19.1 % (ref 11.5–14.5)
GLUCOSE BLD MANUAL STRIP-MCNC: 107 MG/DL (ref 74–99)
GLUCOSE BLD MANUAL STRIP-MCNC: 115 MG/DL (ref 74–99)
GLUCOSE BLD MANUAL STRIP-MCNC: 131 MG/DL (ref 74–99)
GLUCOSE BLD MANUAL STRIP-MCNC: 162 MG/DL (ref 74–99)
GLUCOSE BLD MANUAL STRIP-MCNC: 78 MG/DL (ref 74–99)
GLUCOSE SERPL-MCNC: 114 MG/DL (ref 74–99)
HCT VFR BLD AUTO: 30.3 % (ref 41–52)
HGB BLD-MCNC: 9.6 G/DL (ref 13.5–17.5)
MAGNESIUM SERPL-MCNC: 1.8 MG/DL (ref 1.6–2.4)
MCH RBC QN AUTO: 27.8 PG (ref 26–34)
MCHC RBC AUTO-ENTMCNC: 31.7 G/DL (ref 32–36)
MCV RBC AUTO: 88 FL (ref 80–100)
NRBC BLD-RTO: 0 /100 WBCS (ref 0–0)
PHOSPHATE SERPL-MCNC: 4.1 MG/DL (ref 2.5–4.9)
PLATELET # BLD AUTO: 166 X10*3/UL (ref 150–450)
POTASSIUM SERPL-SCNC: 4.5 MMOL/L (ref 3.5–5.3)
RBC # BLD AUTO: 3.45 X10*6/UL (ref 4.5–5.9)
SODIUM SERPL-SCNC: 133 MMOL/L (ref 136–145)
WBC # BLD AUTO: 8 X10*3/UL (ref 4.4–11.3)

## 2024-05-07 PROCEDURE — 2500000004 HC RX 250 GENERAL PHARMACY W/ HCPCS (ALT 636 FOR OP/ED)

## 2024-05-07 PROCEDURE — 2060000001 HC INTERMEDIATE ICU ROOM DAILY

## 2024-05-07 PROCEDURE — 99231 SBSQ HOSP IP/OBS SF/LOW 25: CPT | Performed by: INTERNAL MEDICINE

## 2024-05-07 PROCEDURE — 80069 RENAL FUNCTION PANEL: CPT

## 2024-05-07 PROCEDURE — 36415 COLL VENOUS BLD VENIPUNCTURE: CPT

## 2024-05-07 PROCEDURE — 2500000001 HC RX 250 WO HCPCS SELF ADMINISTERED DRUGS (ALT 637 FOR MEDICARE OP): Performed by: NURSE PRACTITIONER

## 2024-05-07 PROCEDURE — 2500000002 HC RX 250 W HCPCS SELF ADMINISTERED DRUGS (ALT 637 FOR MEDICARE OP, ALT 636 FOR OP/ED): Performed by: INTERNAL MEDICINE

## 2024-05-07 PROCEDURE — 36415 COLL VENOUS BLD VENIPUNCTURE: CPT | Performed by: INTERNAL MEDICINE

## 2024-05-07 PROCEDURE — 82947 ASSAY GLUCOSE BLOOD QUANT: CPT

## 2024-05-07 PROCEDURE — 87040 BLOOD CULTURE FOR BACTERIA: CPT | Mod: AHULAB | Performed by: INTERNAL MEDICINE

## 2024-05-07 PROCEDURE — 2500000001 HC RX 250 WO HCPCS SELF ADMINISTERED DRUGS (ALT 637 FOR MEDICARE OP): Performed by: FAMILY MEDICINE

## 2024-05-07 PROCEDURE — 83735 ASSAY OF MAGNESIUM: CPT

## 2024-05-07 PROCEDURE — 85027 COMPLETE CBC AUTOMATED: CPT

## 2024-05-07 PROCEDURE — 2500000002 HC RX 250 W HCPCS SELF ADMINISTERED DRUGS (ALT 637 FOR MEDICARE OP, ALT 636 FOR OP/ED): Performed by: SURGERY

## 2024-05-07 PROCEDURE — 2500000006 HC RX 250 W HCPCS SELF ADMINISTERED DRUGS (ALT 637 FOR ALL PAYERS): Performed by: NURSE PRACTITIONER

## 2024-05-07 PROCEDURE — C9113 INJ PANTOPRAZOLE SODIUM, VIA: HCPCS

## 2024-05-07 RX ADMIN — NEPHROCAP 1 CAPSULE: 1 CAP ORAL at 09:09

## 2024-05-07 RX ADMIN — SEVELAMER CARBONATE 2400 MG: 800 TABLET, FILM COATED ORAL at 17:03

## 2024-05-07 RX ADMIN — INSULIN GLARGINE 10 UNITS: 100 INJECTION, SOLUTION SUBCUTANEOUS at 09:10

## 2024-05-07 RX ADMIN — PANTOPRAZOLE SODIUM 40 MG: 40 INJECTION, POWDER, FOR SOLUTION INTRAVENOUS at 09:09

## 2024-05-07 RX ADMIN — CYCLOBENZAPRINE HYDROCHLORIDE 5 MG: 5 TABLET, FILM COATED ORAL at 14:25

## 2024-05-07 RX ADMIN — INSULIN LISPRO 1 UNITS: 100 INJECTION, SOLUTION INTRAVENOUS; SUBCUTANEOUS at 13:11

## 2024-05-07 RX ADMIN — ATORVASTATIN CALCIUM 40 MG: 40 TABLET, FILM COATED ORAL at 21:30

## 2024-05-07 RX ADMIN — OXYCODONE HYDROCHLORIDE 5 MG: 5 TABLET ORAL at 14:25

## 2024-05-07 RX ADMIN — PANTOPRAZOLE SODIUM 40 MG: 40 INJECTION, POWDER, FOR SOLUTION INTRAVENOUS at 21:30

## 2024-05-07 RX ADMIN — SEVELAMER CARBONATE 2400 MG: 800 TABLET, FILM COATED ORAL at 13:11

## 2024-05-07 RX ADMIN — OXYCODONE HYDROCHLORIDE 5 MG: 5 TABLET ORAL at 09:29

## 2024-05-07 RX ADMIN — CYCLOBENZAPRINE HYDROCHLORIDE 5 MG: 5 TABLET, FILM COATED ORAL at 00:19

## 2024-05-07 RX ADMIN — CYCLOBENZAPRINE HYDROCHLORIDE 5 MG: 5 TABLET, FILM COATED ORAL at 21:30

## 2024-05-07 RX ADMIN — OXYCODONE HYDROCHLORIDE 5 MG: 5 TABLET ORAL at 21:30

## 2024-05-07 RX ADMIN — LEVOTHYROXINE SODIUM 125 MCG: 125 TABLET ORAL at 09:09

## 2024-05-07 ASSESSMENT — COGNITIVE AND FUNCTIONAL STATUS - GENERAL
STANDING UP FROM CHAIR USING ARMS: TOTAL
MOVING TO AND FROM BED TO CHAIR: TOTAL
PERSONAL GROOMING: A LITTLE
TURNING FROM BACK TO SIDE WHILE IN FLAT BAD: TOTAL
DRESSING REGULAR LOWER BODY CLOTHING: TOTAL
DAILY ACTIVITIY SCORE: 13
MOBILITY SCORE: 6
DRESSING REGULAR UPPER BODY CLOTHING: A LOT
TOILETING: TOTAL
MOVING FROM LYING ON BACK TO SITTING ON SIDE OF FLAT BED WITH BEDRAILS: TOTAL
CLIMB 3 TO 5 STEPS WITH RAILING: TOTAL
HELP NEEDED FOR BATHING: A LOT
WALKING IN HOSPITAL ROOM: TOTAL

## 2024-05-07 ASSESSMENT — PAIN - FUNCTIONAL ASSESSMENT
PAIN_FUNCTIONAL_ASSESSMENT: 0-10

## 2024-05-07 ASSESSMENT — PAIN SCALES - GENERAL
PAINLEVEL_OUTOF10: 7
PAINLEVEL_OUTOF10: 0 - NO PAIN
PAINLEVEL_OUTOF10: 4
PAINLEVEL_OUTOF10: 4
PAINLEVEL_OUTOF10: 0 - NO PAIN
PAINLEVEL_OUTOF10: 3
PAINLEVEL_OUTOF10: 5 - MODERATE PAIN
PAINLEVEL_OUTOF10: 7
PAINLEVEL_OUTOF10: 0 - NO PAIN
PAINLEVEL_OUTOF10: 2

## 2024-05-07 ASSESSMENT — PAIN DESCRIPTION - LOCATION
LOCATION: BACK
LOCATION: BACK

## 2024-05-07 NOTE — PROGRESS NOTES
Care Coordinator Note:    Plan: Patient in with SIRS- ID following. Echo shows no vegetation. DKA is now resolved and insulin gtt is off. ESRD with HD MWF- line exchanged last Friday.  Remains on Levo gtt and NMR.     Status: inpatient  Payor: [employer]  Disposition: [facility] with HD MWF  Barrier: pressors  ADOD: end of week    Era Ram TCC      05/07/24 1142   Discharge Planning   Patient expects to be discharged to: Re: [facility] with HD MWF

## 2024-05-07 NOTE — PROGRESS NOTES
Edwar Hemphill is a 39 y.o. male on day 7 of admission presenting with SIRS (systemic inflammatory response syndrome) (Multi).      Subjective   Patient seen and examined in the ICU.  Awake, alert and lucid.  Still complains of back discomfort.  No acute events.       Objective          Vitals 24HR  Heart Rate:  [100-116]   Temp:  [36.1 °C (97 °F)-37 °C (98.6 °F)]   Resp:  [16-19]   BP: ()/(59-91)     Intake/Output last 3 Shifts:    Intake/Output Summary (Last 24 hours) at 5/7/2024 1008  Last data filed at 5/7/2024 0400  Gross per 24 hour   Intake 400 ml   Output 0 ml   Net 400 ml       Physical Exam  Constitutional: Well developed, awake/alert/oriented  x3   Eyes: Normal conjunctiva    ENMT: mucous membranes moist   Head/Neck: Neck is supple  Difficult to assess JVD   Respiratory/Thorax: Diminished bibasilar breath sounds,  no wheezing, rales or rhonchi   Cardiovascular: regular rhythm, normal  S1 and S2   Gastrointestinal: Nondistended, soft, non-tender,  no rebound tenderness or guarding   Genitourinary: No Castanon   Extremities: Trace Rt leg edema   L AKA   RUE amputation  R femoral HD catheter   Neurological: No asterixis   Psychological: Appropriate mood and behavior   Skin: L AKA stump  Right femoral TDC     Scheduled Medications  [Held by provider] apixaban, 5 mg, oral, BID  atorvastatin, 40 mg, oral, Nightly  B complex-vitamin C-folic acid, 1 capsule, oral, Daily  epoetin tyson or biosimilar, 100 Units/kg, subcutaneous, Every Mon/Wed/Fri  heparin, 3,100 Units, intra-catheter, After Dialysis  heparin, 3,100 Units, intra-catheter, After Dialysis  insulin glargine, 10 Units, subcutaneous, q24h  insulin lispro, 0-5 Units, subcutaneous, TID with meals  levothyroxine, 125 mcg, oral, Daily before breakfast  oxygen, , inhalation, Continuous - Inhalation  pantoprazole, 40 mg, intravenous, BID  polyethylene glycol, 17 g, oral, Daily  sennosides, 8.6 mg, oral, BID  sevelamer carbonate, 2,400 mg, oral, TID with  meals      Continuous medications  norepinephrine, 0.01-0.5 mcg/kg/min, Last Rate: Stopped (05/05/24 1352)  oxygen, , Last Rate: 2 L/min (05/03/24 1758)        PRN medications: cyclobenzaprine, dextrose, dextrose, fentaNYL PF, glucagon, heparin, lidocaine PF, loperamide, midazolam, nitroglycerin, ondansetron, oxyCODONE, oxygen, simethicone, vancomycin     Relevant Results  Results from last 7 days   Lab Units 05/07/24  0631 05/06/24  0818 05/05/24  0423 05/03/24  1442 04/30/24  1345   WBC AUTO x10*3/uL 8.0 9.4 13.0*   < > 10.6   HEMOGLOBIN g/dL 9.6* 9.6* 9.7*   < > 11.2*   HEMATOCRIT % 30.3* 28.6* 28.9*   < > 34.7*   PLATELETS AUTO x10*3/uL 166 176 205   < > 101*   NEUTROS PCT AUTO %  --   --   --   --  83.2   LYMPHS PCT AUTO %  --   --   --   --  7.1   MONOS PCT AUTO %  --   --   --   --  8.2   EOS PCT AUTO %  --   --   --   --  0.1    < > = values in this interval not displayed.     Results from last 7 days   Lab Units 05/07/24 0631 05/06/24 0818 05/05/24  0423   SODIUM mmol/L 133* 136 137   POTASSIUM mmol/L 4.5 4.4 4.1   CHLORIDE mmol/L 95* 96* 98   CO2 mmol/L 25 26 23   BUN mg/dL 56* 82* 69*   CREATININE mg/dL 5.14* 6.35* 5.51*   GLUCOSE mg/dL 114* 142* 167*   CALCIUM mg/dL 9.0 9.7 9.0       MR lumbar spine wo IV contrast   Final Result   Diffusely hypointense bone marrow signal on the T1 weighted images   may be related to renal osteodystrophy. Differential considerations   include anemia, reactive bone marrow or bone marrow infiltrating   process.        No definite imaging findings to suggest discitis osteomyelitis on the   current exam.        Degenerative changes without significant spinal canal stenosis.   Varying degrees of mild neural foraminal narrowing as detailed above.        I personally reviewed the images/study and I agree with the findings   as stated by Dr. Lowe.        MACRO:   None        Signed by: Eva Og 5/4/2024 11:11 AM   Dictation workstation:   [id number]      IR CVC exchange    Final Result   Fluoroscopically guidewire exchange of right groin tunneled   hemodialysis catheter. The newly exchanged catheter is ready for   immediate use.             MACRO:   None        Signed by: Ronaldo Dempsey 5/3/2024 8:09 PM   Dictation workstation:   [id number]      Transthoracic Echo Complete   Final Result      XR chest 1 view   Final Result   Patchy right basilar infiltrate or atelectasis with small right   pleural effusion.   Signed by Joseph Overton MD      CT thoracic spine wo IV contrast   Final Result   Thoracic spine:   1.  Portions of T1 and T2 cough on this study.   2.  No acute osseous findings seen from T3 through T12.   3.  Tiny bilateral effusions with adjacent lower lobe infiltrates.   4.  The distal adenopathy.   5.  Coronary artery calcifications.   Lumbar spine:   1.  No compression deformities or spinal listhesis is noted.   2.  Disc bulging from L3 to S1.  Evaluation for spinal stenosis is   limited due to lack mammographic contrast.   3.  Incidental note of a long dialysis catheter within the inferior   vena cava.   Signed by Deejay Hoffman MD      CT lumbar spine wo IV contrast   Final Result   Thoracic spine:   1.  Portions of T1 and T2 cough on this study.   2.  No acute osseous findings seen from T3 through T12.   3.  Tiny bilateral effusions with adjacent lower lobe infiltrates.   4.  The distal adenopathy.   5.  Coronary artery calcifications.   Lumbar spine:   1.  No compression deformities or spinal listhesis is noted.   2.  Disc bulging from L3 to S1.  Evaluation for spinal stenosis is   limited due to lack mammographic contrast.   3.  Incidental note of a long dialysis catheter within the inferior   vena cava.   Signed by Deejay Hoffman MD      Lower extremity venous duplex right   Final Result      XR pelvis 1-2 views   Final Result   Slightly limited secondary to underlying osteopenia.  No acute osseous   abnormality.   Signed by Roe Ruiz MD      IR CVC  exchange    (Results Pending)            Assessment/Plan      Edwar Hemphill is a 38 y.o. male with a past medical history of end-stage renal disease on hemodialysis via right femoral TDC who resides at [facility]. 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  He later had stump debridement on 7/27 with wound VAC placement.  He had polymicrobial infection including resistant E coli, Acinetobacter baumannii and Enterobacter fecalis. He was admitted last September with positive blood cultures growing Acinetobacter baumannii and Stenotrophomonas. He went to IR for dialysis line exchange.  He then was admitted again in November where CT noted left sided fluid collection concerning for abscess.  He grew MRSA and Streptococcus, catheter was exchanged on November 13, he completed vancomycin. He then had Staph aureus CLABSI again in January of this.  POLI negative for endocarditis. HD cath removed 1/16 and replaced on 1/19 to the L groin. IV Vancomycin 750mg with HD through 2/14/24.      He comes in now with fever with low back pain radiating down the right leg. He was feeling too sick and missed HD.  He was noted to be febrile.  Blood cultures were obtained and came back growing MRSA.  Infectious diseases following and managing antimicrobial coverage.  He remains bacteremic with MRSA despite guidewire exchange of the groin tunneled HD catheter on 5/3. MRI of the lumbar spine did not show osteomyelitis/discitis. We dialyzed him yesterday albeit he shortness treatment due to back discomfort.  There is no urgent/emergent indication for RRT today.  We will plan for dialysis again tomorrow.  He  will receive erythropoietin and is on a phosphorus binder.  Repeat blood cultures obtained this morning.  Nephrology will follow closely with his care.        Principal Problem:    SIRS (systemic inflammatory response syndrome) (Multi)      I spent 35 minutes in the professional and overall care of this patient.      Eber Bruce, DO

## 2024-05-07 NOTE — TELEPHONE ENCOUNTER
This writer spoke with pt's mother Shanthi, stated that Edwar is currently in the hospital and that he had completed a CT A/P last week while in the hospital.

## 2024-05-07 NOTE — PROGRESS NOTES
Edwar Hemphill is a 39 y.o. male on day 7 of admission presenting with SIRS (systemic inflammatory response syndrome) (Multi).      Subjective   Chart rev       Objective     Last Recorded Vitals  /69 (BP Location: Left arm, Patient Position: Sitting)   Pulse 107   Temp 36.7 °C (98.1 °F) (Temporal)   Resp 21   Wt 70.5 kg (155 lb 6.8 oz)   SpO2 99%   Intake/Output last 3 Shifts:    Intake/Output Summary (Last 24 hours) at 5/7/2024 1753  Last data filed at 5/7/2024 1600  Gross per 24 hour   Intake --   Output 0 ml   Net 0 ml       Admission Weight  Weight: 65.8 kg (145 lb) (04/30/24 0825)    Daily Weight  05/05/24 : 70.5 kg (155 lb 6.8 oz)    Image Results  MR lumbar spine wo IV contrast  Narrative: Interpreted By:  Eva Og and Muddasani Dheeraj   STUDY:  MR LUMBAR SPINE WO IV CONTRAST      INDICATION:  Signs/Symptoms:back pain new with bacteremia, possible infection      COMPARISON:  CT of the lumbar spine 04/30/2024. Body CT 01/14/2024.      ACCESSION NUMBER(S):  [id number]      ORDERING CLINICIAN:  ALKA VIEYRA      TECHNIQUE:  Sagittal T1, sagittal T2, sagittal STIR, axial T1 and axial T2  weighted MRI images of the lumbar spine were obtained without  intravenous contrast administration.      FINDINGS:  Image quality is somewhat degraded due to motion and technique.      There are 5 non-rib-bearing lumbar-type vertebral bodies. The last  well-formed intervertebral disc is labeled L5-S1.      Minimal retrolisthesis at L5-S1. Alignment is otherwise normal.      Vertebral body heights are maintained. Disc spaces are relatively  maintained.      There is diffusely hypointense bone marrow signal on the T1 weighted  images which may be related to renal osteodystrophy. Differential  considerations include anemia, reactive marrow or bone marrow  infiltrating process.      There is no increased STIR signal to suggest marrow edema. Multilevel  degenerative endplate changes include prominent Schmorl's  nodes most  pronounced at L3-4 where there are Modic type 2 changes and central  intradiscal T1 and T2 hypointense signal likely degenerative.      The conus medullaris terminates at L1-2. The visualized spinal cord,  conus medullaris and cauda equina demonstrate normal signal and  morphology.      Fatty atrophic changes involve the paravertebral musculature.  Partially visualized kidneys demonstrate atrophic changes with  multiple bilateral cysts, better characterized on previous body CT  dated 01/14/2024. Partially visualized catheter within the inferior  vena cava and right common iliac vein.      T10-11 on sagittal images only: No spinal canal or neural foraminal  stenosis.      T11-12 on sagittal images only: No spinal canal or neural foraminal  stenosis.      T12-L1: Facet arthropathy and ligamentum flavum thickening. No spinal  canal or neural foraminal stenosis.      L1-2: Facet arthropathy and ligamentum flavum thickening. No spinal  canal or neural foraminal stenosis.      L2-3: Facet arthropathy and ligamentum flavum thickening. No spinal  canal or neural foraminal stenosis.      L3-4: Disc bulge, facet arthropathy and ligamentum flavum thickening.  No spinal canal or neural foraminal stenosis.      L4-5: Disc bulge with small central protrusion, facet arthropathy and  ligamentum flavum thickening. No spinal canal stenosis. Mild  bilateral neural foraminal stenosis.      L5-S1: Disc bulge, facet arthropathy and ligamentum flavum  thickening. No spinal canal stenosis. Mild right and no left neural  foraminal stenosis.      Degenerative changes are noted at the sacroiliac joints.      Impression: Diffusely hypointense bone marrow signal on the T1 weighted images  may be related to renal osteodystrophy. Differential considerations  include anemia, reactive bone marrow or bone marrow infiltrating  process.      No definite imaging findings to suggest discitis osteomyelitis on the  current exam.       Degenerative changes without significant spinal canal stenosis.  Varying degrees of mild neural foraminal narrowing as detailed above.      I personally reviewed the images/study and I agree with the findings  as stated by Dr. Lowe.      MACRO:  None      Signed by: Eva Og 5/4/2024 11:11 AM  Dictation workstation:   [id number]      PHYSICAL EXAM  NEUROLOGICAL: No abnormal movements, no changes from before  HEENT: Head atraumatic, perrl,eomi, no oral lesions, no ear rash   NECK: Supple, no ln, jvp flat, thyroid palp  HEART: S1, S2, no added sound  LUNGS: CTAB, no crackles, no rales, no wheezing, no dullness to percussion, sym exp  EXTREMITIES: no edema  ABDOMEN: Soft, nontender, bowel sound positive, no organomegaly  SKIN: No change  EYES: No changes, perrl, eomi,   ENT: No change    JOINT: No change  Relevant Results               Assessment/Plan      Esrd  T2dm  Pvd  Anemia renal origin   hyponatremia    Principal Problem:    SIRS (systemic inflammatory response syndrome) (Multi)    PLAN  Per ID as bc are still +    t32              Mauricio Estrada MD

## 2024-05-07 NOTE — PROGRESS NOTES
"Edwar Hemphill is a 39 y.o. male on day 7 of admission presenting with SIRS (systemic inflammatory response syndrome) (Multi).    Subjective   Back pain     Scheduled medications  [Held by provider] apixaban, 5 mg, oral, BID  atorvastatin, 40 mg, oral, Nightly  B complex-vitamin C-folic acid, 1 capsule, oral, Daily  epoetin tyson or biosimilar, 100 Units/kg, subcutaneous, Every Mon/Wed/Fri  heparin, 3,100 Units, intra-catheter, After Dialysis  heparin, 3,100 Units, intra-catheter, After Dialysis  insulin glargine, 10 Units, subcutaneous, q24h  insulin lispro, 0-5 Units, subcutaneous, TID with meals  levothyroxine, 125 mcg, oral, Daily before breakfast  oxygen, , inhalation, Continuous - Inhalation  pantoprazole, 40 mg, intravenous, BID  polyethylene glycol, 17 g, oral, Daily  sennosides, 8.6 mg, oral, BID  sevelamer carbonate, 2,400 mg, oral, TID with meals      Continuous medications  norepinephrine, 0.01-0.5 mcg/kg/min, Last Rate: Stopped (05/05/24 1352)  oxygen, , Last Rate: 2 L/min (05/03/24 1758)        Objective   Physical Exam  Constitutional:       General: He is not in acute distress.  Neurological:      Mental Status: He is alert.         Last Recorded Vitals  Blood pressure 108/85, pulse (!) 115, temperature 36.1 °C (97 °F), temperature source Temporal, resp. rate 19, height 1.753 m (5' 9\"), weight 70.5 kg (155 lb 6.8 oz), SpO2 94%.  Intake/Output last 3 Shifts:  I/O last 3 completed shifts:  In: 1040 (14.8 mL/kg) [P.O.:240; I.V.:800 (11.3 mL/kg)]  Out: 0 (0 mL/kg)   Weight: 70.5 kg     Relevant Results  Results from last 7 days   Lab Units 05/07/24  0753 05/07/24  0631 05/07/24  0024 05/06/24  2123 05/06/24  1625 05/06/24  1134 05/06/24  0818 05/05/24  0516 05/05/24  0423 05/04/24 1956 05/04/24  1918 05/03/24  1612 05/03/24  1442   POCT GLUCOSE mg/dL 115*  --  131* 136* 119* 75  --    < >  --    < >  --    < >  --    GLUCOSE mg/dL  --  114*  --   --   --   --  142*  --  167*  --  174*  --  243*    < > = " values in this interval not displayed.      Latest Reference Range & Units 05/07/24 06:31   GLUCOSE 74 - 99 mg/dL 114 (H)   SODIUM 136 - 145 mmol/L 133 (L)   POTASSIUM 3.5 - 5.3 mmol/L 4.5   CHLORIDE 98 - 107 mmol/L 95 (L)   Bicarbonate 21 - 32 mmol/L 25   Anion Gap 10 - 20 mmol/L 18   Blood Urea Nitrogen 6 - 23 mg/dL 56 (H)   Creatinine 0.50 - 1.30 mg/dL 5.14 (H)   EGFR >60 mL/min/1.73m*2 14 (L)   Calcium 8.6 - 10.3 mg/dL 9.0   PHOSPHORUS 2.5 - 4.9 mg/dL 4.1   Albumin 3.4 - 5.0 g/dL 2.8 (L)       Assessment/Plan   Principal Problem:    SIRS (systemic inflammatory response syndrome) (Multi)      IMPRESSION  DIABETIC KETOACIDOSIS, RESOLVED  TYPE 2 DIABETES MELLITUS  LONG TERM CURRENT INSULIN USE        RECOMMENDATIONS  Insulin glargine 10 units QAM  Insulin lispro scale    Mike Santana MD

## 2024-05-08 ENCOUNTER — APPOINTMENT (OUTPATIENT)
Dept: DIALYSIS | Facility: HOSPITAL | Age: 39
End: 2024-05-08
Payer: COMMERCIAL

## 2024-05-08 LAB
ANION GAP SERPL CALC-SCNC: 18 MMOL/L (ref 10–20)
BACTERIA BLD AEROBE CULT: ABNORMAL
BACTERIA BLD AEROBE CULT: ABNORMAL
BACTERIA BLD CULT: ABNORMAL
BACTERIA BLD CULT: ABNORMAL
BUN SERPL-MCNC: 64 MG/DL (ref 6–23)
CALCIUM SERPL-MCNC: 8.9 MG/DL (ref 8.6–10.3)
CHLORIDE SERPL-SCNC: 96 MMOL/L (ref 98–107)
CO2 SERPL-SCNC: 23 MMOL/L (ref 21–32)
CREAT SERPL-MCNC: 6.29 MG/DL (ref 0.5–1.3)
EGFRCR SERPLBLD CKD-EPI 2021: 11 ML/MIN/1.73M*2
GLUCOSE BLD MANUAL STRIP-MCNC: 103 MG/DL (ref 74–99)
GLUCOSE BLD MANUAL STRIP-MCNC: 120 MG/DL (ref 74–99)
GLUCOSE BLD MANUAL STRIP-MCNC: 173 MG/DL (ref 74–99)
GLUCOSE SERPL-MCNC: 51 MG/DL (ref 74–99)
GRAM STN SPEC: ABNORMAL
GRAM STN SPEC: ABNORMAL
HOLD SPECIMEN: NORMAL
POTASSIUM SERPL-SCNC: 4.6 MMOL/L (ref 3.5–5.3)
SODIUM SERPL-SCNC: 132 MMOL/L (ref 136–145)
VANCOMYCIN SERPL-MCNC: 31.3 UG/ML (ref 5–20)

## 2024-05-08 PROCEDURE — C9113 INJ PANTOPRAZOLE SODIUM, VIA: HCPCS

## 2024-05-08 PROCEDURE — 6350000001 HC RX 635 EPOETIN >10,000 UNITS: Mod: JW | Performed by: INTERNAL MEDICINE

## 2024-05-08 PROCEDURE — 2500000006 HC RX 250 W HCPCS SELF ADMINISTERED DRUGS (ALT 637 FOR ALL PAYERS): Performed by: NURSE PRACTITIONER

## 2024-05-08 PROCEDURE — 1100000001 HC PRIVATE ROOM DAILY

## 2024-05-08 PROCEDURE — 2500000004 HC RX 250 GENERAL PHARMACY W/ HCPCS (ALT 636 FOR OP/ED)

## 2024-05-08 PROCEDURE — 36415 COLL VENOUS BLD VENIPUNCTURE: CPT | Performed by: INTERNAL MEDICINE

## 2024-05-08 PROCEDURE — 82947 ASSAY GLUCOSE BLOOD QUANT: CPT

## 2024-05-08 PROCEDURE — 2500000001 HC RX 250 WO HCPCS SELF ADMINISTERED DRUGS (ALT 637 FOR MEDICARE OP): Performed by: NURSE PRACTITIONER

## 2024-05-08 PROCEDURE — 2500000001 HC RX 250 WO HCPCS SELF ADMINISTERED DRUGS (ALT 637 FOR MEDICARE OP): Performed by: FAMILY MEDICINE

## 2024-05-08 PROCEDURE — 2500000004 HC RX 250 GENERAL PHARMACY W/ HCPCS (ALT 636 FOR OP/ED): Performed by: NURSE PRACTITIONER

## 2024-05-08 PROCEDURE — 8010000001 HC DIALYSIS - HEMODIALYSIS PER DAY

## 2024-05-08 PROCEDURE — 80048 BASIC METABOLIC PNL TOTAL CA: CPT | Performed by: INTERNAL MEDICINE

## 2024-05-08 PROCEDURE — 99231 SBSQ HOSP IP/OBS SF/LOW 25: CPT | Performed by: INTERNAL MEDICINE

## 2024-05-08 PROCEDURE — 2500000002 HC RX 250 W HCPCS SELF ADMINISTERED DRUGS (ALT 637 FOR MEDICARE OP, ALT 636 FOR OP/ED): Performed by: INTERNAL MEDICINE

## 2024-05-08 PROCEDURE — 2500000005 HC RX 250 GENERAL PHARMACY W/O HCPCS: Performed by: SURGERY

## 2024-05-08 PROCEDURE — 2500000002 HC RX 250 W HCPCS SELF ADMINISTERED DRUGS (ALT 637 FOR MEDICARE OP, ALT 636 FOR OP/ED): Performed by: SURGERY

## 2024-05-08 PROCEDURE — 80202 ASSAY OF VANCOMYCIN: CPT | Performed by: INTERNAL MEDICINE

## 2024-05-08 PROCEDURE — 2500000004 HC RX 250 GENERAL PHARMACY W/ HCPCS (ALT 636 FOR OP/ED): Performed by: INTERNAL MEDICINE

## 2024-05-08 RX ORDER — INSULIN GLARGINE 100 [IU]/ML
5 INJECTION, SOLUTION SUBCUTANEOUS EVERY 24 HOURS
Status: DISCONTINUED | OUTPATIENT
Start: 2024-05-08 | End: 2024-05-09

## 2024-05-08 RX ADMIN — OXYCODONE HYDROCHLORIDE 5 MG: 5 TABLET ORAL at 09:13

## 2024-05-08 RX ADMIN — NEPHROCAP 1 CAPSULE: 1 CAP ORAL at 09:11

## 2024-05-08 RX ADMIN — CYCLOBENZAPRINE HYDROCHLORIDE 5 MG: 5 TABLET, FILM COATED ORAL at 11:02

## 2024-05-08 RX ADMIN — ATORVASTATIN CALCIUM 40 MG: 40 TABLET, FILM COATED ORAL at 22:01

## 2024-05-08 RX ADMIN — EPOETIN ALFA-EPBX 8000 UNITS: 10000 INJECTION, SOLUTION INTRAVENOUS; SUBCUTANEOUS at 17:31

## 2024-05-08 RX ADMIN — Medication 21 PERCENT: at 08:00

## 2024-05-08 RX ADMIN — LEVOTHYROXINE SODIUM 125 MCG: 125 TABLET ORAL at 09:12

## 2024-05-08 RX ADMIN — INSULIN GLARGINE 5 UNITS: 100 INJECTION, SOLUTION SUBCUTANEOUS at 09:15

## 2024-05-08 RX ADMIN — OXYCODONE HYDROCHLORIDE 5 MG: 5 TABLET ORAL at 22:01

## 2024-05-08 RX ADMIN — HEPARIN SODIUM 3100 UNITS: 1000 INJECTION INTRAVENOUS; SUBCUTANEOUS at 12:56

## 2024-05-08 RX ADMIN — HYDROMORPHONE HYDROCHLORIDE 0.5 MG: 1 INJECTION, SOLUTION INTRAMUSCULAR; INTRAVENOUS; SUBCUTANEOUS at 17:31

## 2024-05-08 RX ADMIN — OXYCODONE HYDROCHLORIDE 5 MG: 5 TABLET ORAL at 13:26

## 2024-05-08 RX ADMIN — PANTOPRAZOLE SODIUM 40 MG: 40 INJECTION, POWDER, FOR SOLUTION INTRAVENOUS at 22:01

## 2024-05-08 RX ADMIN — HEPARIN SODIUM 3100 UNITS: 1000 INJECTION INTRAVENOUS; SUBCUTANEOUS at 12:57

## 2024-05-08 RX ADMIN — PANTOPRAZOLE SODIUM 40 MG: 40 INJECTION, POWDER, FOR SOLUTION INTRAVENOUS at 09:13

## 2024-05-08 RX ADMIN — SEVELAMER CARBONATE 2400 MG: 800 TABLET, FILM COATED ORAL at 17:31

## 2024-05-08 RX ADMIN — INSULIN LISPRO 1 UNITS: 100 INJECTION, SOLUTION INTRAVENOUS; SUBCUTANEOUS at 17:32

## 2024-05-08 ASSESSMENT — PAIN SCALES - GENERAL
PAINLEVEL_OUTOF10: 7
PAINLEVEL_OUTOF10: 10 - WORST POSSIBLE PAIN
PAINLEVEL_OUTOF10: 0 - NO PAIN
PAINLEVEL_OUTOF10: 10 - WORST POSSIBLE PAIN
PAINLEVEL_OUTOF10: 10 - WORST POSSIBLE PAIN
PAINLEVEL_OUTOF10: 6
PAINLEVEL_OUTOF10: 10 - WORST POSSIBLE PAIN
PAINLEVEL_OUTOF10: 10 - WORST POSSIBLE PAIN
PAINLEVEL_OUTOF10: 0 - NO PAIN
PAINLEVEL_OUTOF10: 5 - MODERATE PAIN

## 2024-05-08 ASSESSMENT — COGNITIVE AND FUNCTIONAL STATUS - GENERAL
DRESSING REGULAR UPPER BODY CLOTHING: A LOT
MOVING FROM LYING ON BACK TO SITTING ON SIDE OF FLAT BED WITH BEDRAILS: A LITTLE
HELP NEEDED FOR BATHING: TOTAL
STANDING UP FROM CHAIR USING ARMS: TOTAL
TOILETING: TOTAL
WALKING IN HOSPITAL ROOM: TOTAL
DAILY ACTIVITIY SCORE: 10
MOVING TO AND FROM BED TO CHAIR: A LOT
TURNING FROM BACK TO SIDE WHILE IN FLAT BAD: A LITTLE
CLIMB 3 TO 5 STEPS WITH RAILING: TOTAL
DRESSING REGULAR LOWER BODY CLOTHING: TOTAL
MOBILITY SCORE: 11
PERSONAL GROOMING: TOTAL

## 2024-05-08 ASSESSMENT — PAIN - FUNCTIONAL ASSESSMENT
PAIN_FUNCTIONAL_ASSESSMENT: 0-10

## 2024-05-08 ASSESSMENT — PAIN DESCRIPTION - LOCATION: LOCATION: BACK

## 2024-05-08 ASSESSMENT — PAIN DESCRIPTION - DESCRIPTORS: DESCRIPTORS: ACHING;STABBING

## 2024-05-08 NOTE — PROGRESS NOTES
"Vancomycin Dosing by Pharmacy- FOLLOW UP    Edwar Hemphill is a 39 y.o. year old male who Pharmacy has been consulted for vancomycin dosing for line infections. Based on the patient's indication and renal status this patient is being dosed based on a goal pre-HD level of 15-25.     Renal function is currently stable.    Most recent random level: 31.3 mcg/mL    Visit Vitals  /61   Pulse (!) 113   Temp 36 °C (96.8 °F) (Temporal)   Resp 21        Lab Results   Component Value Date    CREATININE 6.29 (H) 05/08/2024    CREATININE 5.14 (H) 05/07/2024    CREATININE 6.35 (H) 05/06/2024    CREATININE 5.51 (H) 05/05/2024        Patient weight is No results found for: \"PTWEIGHT\"    No results found for: \"CULTURE\"     No intake/output data recorded.  [unfilled]    Lab Results   Component Value Date    PATIENTTEMP 37.0 05/04/2024    PATIENTTEMP 37.0 01/14/2024    PATIENTTEMP 37.0 07/09/2023    PATIENTTEMP 37.0 07/08/2023    PATIENTTEMP 37.0 07/08/2023        Assessment/Plan    No doses will be given today. The next level will be obtained on 5/10 at 0500. May be obtained sooner if clinically indicated.   Will continue to monitor renal function daily while on vancomycin and order serum creatinine at least every 48 hours if not already ordered.  Follow for continued vancomycin needs, clinical response, and signs/symptoms of toxicity.       Dori Flores, PharmD           "

## 2024-05-08 NOTE — PRE-PROCEDURE NOTE
Report from Sending RN:    Report From:  David Cameron  Recent Surgery of Procedure: No  Baseline Level of Consciousness (LOC): Pt awake a/o x 3  Oxygen Use: No  Type: RA  Diabetic: Yes  Last BP Med Given Day of Dialysis: See MAR  Last Pain Med Given: See MAR  Lab Tests to be Obtained with Dialysis: No  Blood Transfusion to be Given During Dialysis: No  Available IV Access: Yes  Medications to be Administered During Dialysis: Yes, See MAR  Continuous IV Infusion Running: No  Restraints on Currently or in the Last 24 Hours: No  Hand-Off Communication: Pt awake c/o back pain full code [id number]   Temp= 36.4  Dialysis Catheter Dressing: Yes  Last Dressing Change: 05/06/24

## 2024-05-08 NOTE — PROCEDURES
"Patient seen on dialysis.  Appears more comfortable today lying in bed.  Unfortunately repeat cultures from 5/7 remain positive.  ID is following and managing antimicrobial coverage.  He is dialyzing he is dialyzing for an F1 60 kidney x 3.25 hours with a BFR/DFR of 350/600 mL/minute, 3K bath, 2.5 calcium with a UF target reduced 0.5 kg due to low blood pressure.  Utilizing cool dialysate albeit may need to add midodrine.  He is on a phosphorus binder and renal multivitamin.  I will ensure that he receives erythropoietin.  Tolerating dialysis, continue per submitted orders.    /65   Pulse (!) 131   Temp 36 °C (96.8 °F) (Temporal)   Resp 21   Ht 1.753 m (5' 9\")   Wt 70.5 kg (155 lb 6.8 oz)   SpO2 92%   BMI 22.95 kg/m²     "

## 2024-05-08 NOTE — PROGRESS NOTES
"Edwar Hemphill is a 39 y.o. male on day 8 of admission presenting with SIRS (systemic inflammatory response syndrome) (Multi).    Subjective   Hypoglycemia this AM    Scheduled medications  [Held by provider] apixaban, 5 mg, oral, BID  atorvastatin, 40 mg, oral, Nightly  B complex-vitamin C-folic acid, 1 capsule, oral, Daily  epoetin tyson or biosimilar, 100 Units/kg, subcutaneous, Every Mon/Wed/Fri  heparin, 3,100 Units, intra-catheter, After Dialysis  heparin, 3,100 Units, intra-catheter, After Dialysis  insulin glargine, 10 Units, subcutaneous, q24h  insulin lispro, 0-5 Units, subcutaneous, TID with meals  levothyroxine, 125 mcg, oral, Daily before breakfast  oxygen, , inhalation, Continuous - Inhalation  pantoprazole, 40 mg, intravenous, BID  polyethylene glycol, 17 g, oral, Daily  sennosides, 8.6 mg, oral, BID  sevelamer carbonate, 2,400 mg, oral, TID with meals      Continuous medications  norepinephrine, 0.01-0.5 mcg/kg/min, Last Rate: Stopped (05/05/24 1352)  oxygen, , Last Rate: 2 L/min (05/03/24 1758)      Objective   Physical Exam  Constitutional:       General: He is not in acute distress.  Neurological:      Mental Status: He is alert.         Last Recorded Vitals  Blood pressure 102/61, pulse (!) 113, temperature 36 °C (96.8 °F), temperature source Temporal, resp. rate 21, height 1.753 m (5' 9\"), weight 70.5 kg (155 lb 6.8 oz), SpO2 97%.  Intake/Output last 3 Shifts:  No intake/output data recorded.    Relevant Results  Results from last 7 days   Lab Units 05/08/24  0632 05/07/24  2136 05/07/24  1625 05/07/24  1120 05/07/24  0753 05/07/24  0631 05/07/24  0024 05/06/24  1134 05/06/24  0818 05/05/24  0516 05/05/24  0423 05/04/24  1956 05/04/24  1918   POCT GLUCOSE mg/dL  --  107* 78 162* 115*  --  131*   < >  --    < >  --    < >  --    GLUCOSE mg/dL 51*  --   --   --   --  114*  --   --  142*  --  167*  --  174*    < > = values in this interval not displayed.       Assessment/Plan   Principal Problem:    " SIRS (systemic inflammatory response syndrome) (Multi)    IMPRESSION  DIABETIC KETOACIDOSIS, RESOLVED  TYPE 2 DIABETES MELLITUS  LONG TERM CURRENT INSULIN USE  Hypoglycemia this AM       RECOMMENDATIONS  Decrease Insulin glargine to 5 units QAM  Insulin lispro scale QAC      Mike Santana MD

## 2024-05-08 NOTE — PROGRESS NOTES
"  INFECTIOUS DISEASE DAILY PROGRESS NOTE    SUBJECTIVE:    No new events. Remains afebrile. Repeat blood cx pending.    OBJECTIVE:  VITALS (Last 24 Hours)  /68   Pulse (!) 112   Temp 36 °C (96.8 °F) (Temporal)   Resp 21   Ht 1.753 m (5' 9\")   Wt 70.5 kg (155 lb 6.8 oz)   SpO2 97%   BMI 22.95 kg/m²     PHYSICAL EXAM:  Gen - NAD  Abd - soft, no ttp, BS present  RLE - s/p AKA  RUE - s/p amputation  Groin - femoral HD catheter present  Skin - no rash    ABX: IV Vancomycin    LABS:  Lab Results   Component Value Date    WBC 8.0 05/07/2024    HGB 9.6 (L) 05/07/2024    HCT 30.3 (L) 05/07/2024    MCV 88 05/07/2024     05/07/2024     Lab Results   Component Value Date    GLUCOSE 51 (LL) 05/08/2024    CALCIUM 8.9 05/08/2024     (L) 05/08/2024    K 4.6 05/08/2024    CO2 23 05/08/2024    CL 96 (L) 05/08/2024    BUN 64 (H) 05/08/2024    CREATININE 6.29 (H) 05/08/2024     Results from last 72 hours   Lab Units 05/07/24  0631   ALBUMIN g/dL 2.8*     Estimated Creatinine Clearance: 15.6 mL/min (A) (by C-G formula based on SCr of 6.29 mg/dL (H)).    IMAGING:  MRI L-spine 5/3  IMPRESSION:  Diffusely hypointense bone marrow signal on the T1 weighted images  may be related to renal osteodystrophy. Differential considerations  include anemia, reactive bone marrow or bone marrow infiltrating  process.    No definite imaging findings to suggest discitis osteomyelitis on the  current exam.    Degenerative changes without significant spinal canal stenosis.  Varying degrees of mild neural foraminal narrowing as detailed above.      ASSESSMENT/PLAN:     CLABSI (HD cath POA) due to MRSA - line exchanged 5/3. Not having a full line holiday because of risk of losing HD access. TTE without endocarditis. Repeat blood cx 5/5 still positive.  Lumbar Spine Pain - CT without epidural abscess. MRI without OM/discitis.  DM II with DKA - DKA resolved    IV Vancomycin.    Blood cx x2 pending for clearance again.    Monitoring for " adverse effects of abx such as rash/itching/diarrhea - none apparent.    Will follow. Thanks!    Mat Jauregui MD  [facility]  Office #[phone]

## 2024-05-08 NOTE — PROGRESS NOTES
05/08/24 1504   Discharge Planning   Patient expects to be discharged to: [facility]        Patient's DKA resolved, was on insulin drip 2 days ago, it's off . Levo is off. Patient will transfer out of ICU to med-surg floor. When he is med ready will return to [facility].

## 2024-05-08 NOTE — POST-PROCEDURE NOTE
Report to Receiving RN:    Report To: David Varghese  Time Report Called: 12:40  Hand-Off Communication: Pt awake c/o back pain, pain meds given by RN fluid removed = 0.5L   BP= 111/65  JZ=813  Complications During Treatment: Yes, Hypotension  Ultrafiltration Treatment: No  Medications Administered During Dialysis: Yes  Blood Products Administered During Dialysis: No  Labs Sent During Dialysis: No  Heparin Drip Rate Changes: No  Dialysis Catheter Dressing: Yes  Last Dressing Change: 5/6/24    Electronic Signatures:    HOMERO Minaya, CHT    Last Updated: 12:23 PM by EDWIN TREVINO

## 2024-05-08 NOTE — NURSING NOTE
0745: BG noted to be 51 on AM BMP. Endocrine MD notified, verbal order to ensure patient still gets morning Lantus. Patient refused follow up Accu check and dextrose medications but he was willing to drink AppleJuice to raise his BG.

## 2024-05-09 ENCOUNTER — HOSPITAL ENCOUNTER (INPATIENT)
Dept: CARDIOLOGY | Facility: HOSPITAL | Age: 39
Discharge: HOME | DRG: 314 | End: 2024-05-09
Payer: COMMERCIAL

## 2024-05-09 VITALS
SYSTOLIC BLOOD PRESSURE: 138 MMHG | DIASTOLIC BLOOD PRESSURE: 80 MMHG | TEMPERATURE: 95.7 F | RESPIRATION RATE: 16 BRPM | HEART RATE: 115 BPM | OXYGEN SATURATION: 97 %

## 2024-05-09 LAB
GLUCOSE BLD MANUAL STRIP-MCNC: 146 MG/DL (ref 74–99)
GLUCOSE BLD MANUAL STRIP-MCNC: 165 MG/DL (ref 74–99)
GLUCOSE BLD MANUAL STRIP-MCNC: 197 MG/DL (ref 74–99)
GLUCOSE BLD MANUAL STRIP-MCNC: 68 MG/DL (ref 74–99)

## 2024-05-09 PROCEDURE — 2500000006 HC RX 250 W HCPCS SELF ADMINISTERED DRUGS (ALT 637 FOR ALL PAYERS): Performed by: NURSE PRACTITIONER

## 2024-05-09 PROCEDURE — 1100000001 HC PRIVATE ROOM DAILY

## 2024-05-09 PROCEDURE — 2500000004 HC RX 250 GENERAL PHARMACY W/ HCPCS (ALT 636 FOR OP/ED)

## 2024-05-09 PROCEDURE — 2500000005 HC RX 250 GENERAL PHARMACY W/O HCPCS: Performed by: SURGERY

## 2024-05-09 PROCEDURE — 36589 REMOVAL TUNNELED CV CATH: CPT

## 2024-05-09 PROCEDURE — 99231 SBSQ HOSP IP/OBS SF/LOW 25: CPT | Performed by: INTERNAL MEDICINE

## 2024-05-09 PROCEDURE — 82947 ASSAY GLUCOSE BLOOD QUANT: CPT

## 2024-05-09 PROCEDURE — 2500000001 HC RX 250 WO HCPCS SELF ADMINISTERED DRUGS (ALT 637 FOR MEDICARE OP): Performed by: NURSE PRACTITIONER

## 2024-05-09 PROCEDURE — 2500000002 HC RX 250 W HCPCS SELF ADMINISTERED DRUGS (ALT 637 FOR MEDICARE OP, ALT 636 FOR OP/ED): Performed by: INTERNAL MEDICINE

## 2024-05-09 PROCEDURE — 2500000001 HC RX 250 WO HCPCS SELF ADMINISTERED DRUGS (ALT 637 FOR MEDICARE OP): Performed by: FAMILY MEDICINE

## 2024-05-09 PROCEDURE — C9113 INJ PANTOPRAZOLE SODIUM, VIA: HCPCS

## 2024-05-09 RX ORDER — INSULIN GLARGINE 100 [IU]/ML
4 INJECTION, SOLUTION SUBCUTANEOUS NIGHTLY
Status: DISCONTINUED | OUTPATIENT
Start: 2024-05-09 | End: 2024-05-13

## 2024-05-09 RX ADMIN — INSULIN GLARGINE 4 UNITS: 100 INJECTION, SOLUTION SUBCUTANEOUS at 22:22

## 2024-05-09 RX ADMIN — NEPHROCAP 1 CAPSULE: 1 CAP ORAL at 08:59

## 2024-05-09 RX ADMIN — PANTOPRAZOLE SODIUM 40 MG: 40 INJECTION, POWDER, FOR SOLUTION INTRAVENOUS at 22:23

## 2024-05-09 RX ADMIN — SEVELAMER CARBONATE 2400 MG: 800 TABLET, FILM COATED ORAL at 08:59

## 2024-05-09 RX ADMIN — CYCLOBENZAPRINE HYDROCHLORIDE 5 MG: 5 TABLET, FILM COATED ORAL at 10:26

## 2024-05-09 RX ADMIN — CYCLOBENZAPRINE HYDROCHLORIDE 5 MG: 5 TABLET, FILM COATED ORAL at 14:09

## 2024-05-09 RX ADMIN — OXYCODONE HYDROCHLORIDE 5 MG: 5 TABLET ORAL at 08:59

## 2024-05-09 RX ADMIN — CYCLOBENZAPRINE HYDROCHLORIDE 5 MG: 5 TABLET, FILM COATED ORAL at 17:25

## 2024-05-09 RX ADMIN — PANTOPRAZOLE SODIUM 40 MG: 40 INJECTION, POWDER, FOR SOLUTION INTRAVENOUS at 10:19

## 2024-05-09 RX ADMIN — OXYCODONE HYDROCHLORIDE 5 MG: 5 TABLET ORAL at 17:25

## 2024-05-09 RX ADMIN — SEVELAMER CARBONATE 2400 MG: 800 TABLET, FILM COATED ORAL at 13:32

## 2024-05-09 RX ADMIN — SEVELAMER CARBONATE 2400 MG: 800 TABLET, FILM COATED ORAL at 17:25

## 2024-05-09 RX ADMIN — OXYCODONE HYDROCHLORIDE 5 MG: 5 TABLET ORAL at 12:43

## 2024-05-09 RX ADMIN — Medication 21 PERCENT: at 08:10

## 2024-05-09 RX ADMIN — LEVOTHYROXINE SODIUM 125 MCG: 125 TABLET ORAL at 06:08

## 2024-05-09 RX ADMIN — ATORVASTATIN CALCIUM 40 MG: 40 TABLET, FILM COATED ORAL at 22:23

## 2024-05-09 ASSESSMENT — PAIN DESCRIPTION - DESCRIPTORS: DESCRIPTORS: ACHING

## 2024-05-09 ASSESSMENT — PAIN SCALES - GENERAL
PAINLEVEL_OUTOF10: 5 - MODERATE PAIN
PAINLEVEL_OUTOF10: 7
PAINLEVEL_OUTOF10: 3
PAINLEVEL_OUTOF10: 0 - NO PAIN
PAINLEVEL_OUTOF10: 8

## 2024-05-09 ASSESSMENT — PAIN - FUNCTIONAL ASSESSMENT
PAIN_FUNCTIONAL_ASSESSMENT: 0-10

## 2024-05-09 ASSESSMENT — PAIN DESCRIPTION - LOCATION
LOCATION: BACK
LOCATION: BACK

## 2024-05-09 NOTE — PROGRESS NOTES
05/09/24 1115   Discharge Planning   Patient expects to be discharged to: [facility]     Patient is not med ready, per notes Blood cx still +, patient on IV Vanco updates sent to [place] through [facility], I will continue to monitor for discharge planning.

## 2024-05-09 NOTE — PROGRESS NOTES
"  INFECTIOUS DISEASE DAILY PROGRESS NOTE    SUBJECTIVE:    No new events. Remains afebrile. Repeat blood cx are still positive.    OBJECTIVE:  VITALS (Last 24 Hours)  BP (!) 99/48 (BP Location: Right leg) Comment: pt asymptomatic  Pulse 105   Temp 36.9 °C (98.5 °F) (Oral)   Resp 16   Ht 1.753 m (5' 9\")   Wt 70.5 kg (155 lb 6.8 oz)   SpO2 97%   BMI 22.95 kg/m²     PHYSICAL EXAM:  Gen - NAD  Abd - soft, no ttp, BS present  RLE - s/p AKA  RUE - s/p amputation  Groin - femoral HD catheter present  Skin - no rash    ABX: IV Vancomycin    LABS:  Lab Results   Component Value Date    WBC 8.0 05/07/2024    HGB 9.6 (L) 05/07/2024    HCT 30.3 (L) 05/07/2024    MCV 88 05/07/2024     05/07/2024     Lab Results   Component Value Date    GLUCOSE 51 (LL) 05/08/2024    CALCIUM 8.9 05/08/2024     (L) 05/08/2024    K 4.6 05/08/2024    CO2 23 05/08/2024    CL 96 (L) 05/08/2024    BUN 64 (H) 05/08/2024    CREATININE 6.29 (H) 05/08/2024     Results from last 72 hours   Lab Units 05/07/24  0631   ALBUMIN g/dL 2.8*     Estimated Creatinine Clearance: 15.6 mL/min (A) (by C-G formula based on SCr of 6.29 mg/dL (H)).    IMAGING:  MRI L-spine 5/3  IMPRESSION:  Diffusely hypointense bone marrow signal on the T1 weighted images  may be related to renal osteodystrophy. Differential considerations  include anemia, reactive bone marrow or bone marrow infiltrating  process.    No definite imaging findings to suggest discitis osteomyelitis on the  current exam.    Degenerative changes without significant spinal canal stenosis.  Varying degrees of mild neural foraminal narrowing as detailed above.      ASSESSMENT/PLAN:     CLABSI (HD cath POA) due to MRSA - line exchanged 5/3. Not having a full line holiday because of risk of losing HD access. TTE without endocarditis. Repeat blood cx 5/5 still positive.  Lumbar Spine Pain - CT without epidural abscess. MRI without OM/discitis.  DM II with DKA - DKA resolved    IV Vancomycin. His " blood cultures remain positive. Will discuss with IR and nephrology what our next steps might be with his HD catheter. This was exchanged due to risk of losing access but if we cannot clear his bacteremia we may be forced to do a line holiday and take that risk. His Vancomycin levels have been therapeutic.    Monitoring for adverse effects of abx such as rash/itching/diarrhea - none apparent.    Will follow. Thanks!    Mat Jauregui MD  [facility]  Office #[phone]

## 2024-05-09 NOTE — PROGRESS NOTES
Edwar Hemphill is a 39 y.o. male on day 8 of admission presenting with SIRS (systemic inflammatory response syndrome) (Multi).      Subjective   No new issues        Objective     Last Recorded Vitals  BP 88/70   Pulse (!) 127   Temp 36.1 °C (97 °F)   Resp 19   Wt 70.5 kg (155 lb 6.8 oz)   SpO2 98%   Intake/Output last 3 Shifts:    Intake/Output Summary (Last 24 hours) at 5/8/2024 2143  Last data filed at 5/8/2024 1230  Gross per 24 hour   Intake 1200 ml   Output 0 ml   Net 1200 ml       Admission Weight  Weight: 65.8 kg (145 lb) (04/30/24 0825)    Daily Weight  05/08/24 : 70.5 kg (155 lb 6.8 oz)    Image Results  MR lumbar spine wo IV contrast  Narrative: Interpreted By:  Eva Og,  Heather Hernandez   STUDY:  MR LUMBAR SPINE WO IV CONTRAST      INDICATION:  Signs/Symptoms:back pain new with bacteremia, possible infection      COMPARISON:  CT of the lumbar spine 04/30/2024. Body CT 01/14/2024.      ACCESSION NUMBER(S):  [id number]      ORDERING CLINICIAN:  ALKA VIEYRA      TECHNIQUE:  Sagittal T1, sagittal T2, sagittal STIR, axial T1 and axial T2  weighted MRI images of the lumbar spine were obtained without  intravenous contrast administration.      FINDINGS:  Image quality is somewhat degraded due to motion and technique.      There are 5 non-rib-bearing lumbar-type vertebral bodies. The last  well-formed intervertebral disc is labeled L5-S1.      Minimal retrolisthesis at L5-S1. Alignment is otherwise normal.      Vertebral body heights are maintained. Disc spaces are relatively  maintained.      There is diffusely hypointense bone marrow signal on the T1 weighted  images which may be related to renal osteodystrophy. Differential  considerations include anemia, reactive marrow or bone marrow  infiltrating process.      There is no increased STIR signal to suggest marrow edema. Multilevel  degenerative endplate changes include prominent Schmorl's nodes most  pronounced at L3-4 where there are  Modic type 2 changes and central  intradiscal T1 and T2 hypointense signal likely degenerative.      The conus medullaris terminates at L1-2. The visualized spinal cord,  conus medullaris and cauda equina demonstrate normal signal and  morphology.      Fatty atrophic changes involve the paravertebral musculature.  Partially visualized kidneys demonstrate atrophic changes with  multiple bilateral cysts, better characterized on previous body CT  dated 01/14/2024. Partially visualized catheter within the inferior  vena cava and right common iliac vein.      T10-11 on sagittal images only: No spinal canal or neural foraminal  stenosis.      T11-12 on sagittal images only: No spinal canal or neural foraminal  stenosis.      T12-L1: Facet arthropathy and ligamentum flavum thickening. No spinal  canal or neural foraminal stenosis.      L1-2: Facet arthropathy and ligamentum flavum thickening. No spinal  canal or neural foraminal stenosis.      L2-3: Facet arthropathy and ligamentum flavum thickening. No spinal  canal or neural foraminal stenosis.      L3-4: Disc bulge, facet arthropathy and ligamentum flavum thickening.  No spinal canal or neural foraminal stenosis.      L4-5: Disc bulge with small central protrusion, facet arthropathy and  ligamentum flavum thickening. No spinal canal stenosis. Mild  bilateral neural foraminal stenosis.      L5-S1: Disc bulge, facet arthropathy and ligamentum flavum  thickening. No spinal canal stenosis. Mild right and no left neural  foraminal stenosis.      Degenerative changes are noted at the sacroiliac joints.      Impression: Diffusely hypointense bone marrow signal on the T1 weighted images  may be related to renal osteodystrophy. Differential considerations  include anemia, reactive bone marrow or bone marrow infiltrating  process.      No definite imaging findings to suggest discitis osteomyelitis on the  current exam.      Degenerative changes without significant spinal canal  stenosis.  Varying degrees of mild neural foraminal narrowing as detailed above.      I personally reviewed the images/study and I agree with the findings  as stated by Dr. Lowe.      MACRO:  None      Signed by: Eva Og 5/4/2024 11:11 AM  Dictation workstation:   [id number]      PHYSICAL EXAM  NEUROLOGICAL: No abnormal movements, no changes from before  HEENT: Head atraumatic, perrl,eomi, no oral lesions, no ear rash   NECK: Supple, no ln, jvp flat, thyroid palp  HEART: S1, S2, no added sound  LUNGS: CTAB, no crackles, no rales, no wheezing, no dullness to percussion, sym exp  EXTREMITIES: no edema  ABDOMEN: Soft, nontender, bowel sound positive, no organomegaly  SKIN: No change  EYES: No changes, perrl, eomi,   ENT: No change    JOINT: No change  Relevant Results               Assessment/Plan        Esrd  Oa  T2dm  pvd    Principal Problem:    SIRS (systemic inflammatory response syndrome) (Multi)    Dc back to [place] once cleared by ID  [id number]              Mauricio Estrada MD

## 2024-05-09 NOTE — CARE PLAN
The patient's goals for the shift include      The clinical goals for the shift include pt to have no c/o pain throughout the shift.      Problem: Pain  Goal: My pain/discomfort is manageable  Outcome: Progressing     Problem: Safety  Goal: Patient will be injury free during hospitalization  Outcome: Progressing  Goal: I will remain free of falls  Outcome: Progressing     Problem: Daily Care  Goal: Daily care needs are met  Outcome: Progressing     Problem: Psychosocial Needs  Goal: Demonstrates ability to cope with hospitalization/illness  Outcome: Progressing  Goal: Collaborate with me, my family, and caregiver to identify my specific goals  Outcome: Progressing

## 2024-05-09 NOTE — NURSING NOTE
Patient consented; time out performed; patient catheter removed using sterile procedure; no complications;

## 2024-05-09 NOTE — NURSING NOTE
This RN offered to move pt back into bed with pt refusing and insisting on staying in his chair. Due to low BP and slightly elevated HR, this RN wanted to get EKG, with the pt refusing this intervention as well. Hoda GALVAN made aware.

## 2024-05-09 NOTE — SIGNIFICANT EVENT
Interval Hx:  -notified by RN regarding BP 85/60,   -ESRD on IHD, had dialysis earlier today  -ECHO 5/3/24: LVSF moderately decreased.  EF 35-40%.  Abnormal septal motion consistent with LBBB.  Impaired relaxation of LV diastolic filling.  NO vegetations.  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

## 2024-05-09 NOTE — PROGRESS NOTES
Pharmacy Medication History Review    Edwar Hemphill is a 39 y.o. male admitted for SIRS (systemic inflammatory response syndrome) (Multi). Pharmacy reviewed the patient's [id number] medications and allergies for accuracy.    The list below reflectives the updated PTA list. Please review each medication in order reconciliation for additional clarification and justification.  Prior to Admission Medications   Prescriptions Last Dose Informant     B complex-vitamin C-folic acid (Nephrocaps) 1 mg capsule 5/2/2024      Sig: Take 1 capsule by mouth once daily.   Basaglar KwikPen U-100 Insulin 100 unit/mL (3 mL) pen 5/2/2024      Sig: Inject 12 Units under the skin once daily at bedtime.   acetaminophen (Tylenol) 325 mg tablet 5/2/2024      Sig: Take 2 tablets (650 mg) by mouth every 4 hours if needed for moderate pain (4 - 6).                                              insulin lispro (HumaLOG) 100 unit/mL injection 5/2/2024      Sig: Inject 0-0.15 mL (0-15 Units) under the skin 3 times a day with meals. Take as directed per insulin instructions.   levothyroxine (Synthroid, Levoxyl) 125 mcg tablet 5/2/2024      Sig: Take 1 tablet (125 mcg) by mouth once daily in the morning. Take before meals.   loperamide (Imodium) 2 mg capsule 5/2/2024      Sig: Take 2 capsules (4 mg) by mouth every 2 hours if needed for diarrhea (FOR LOOSE STOLLS. 2 TABLETS AFTER EVERY EPISODE. DO NOT EXCEED 16MG TOTAL IN 24 HOURS.).   melatonin 3 mg tablet 5/2/2024      Sig: Take 1 tablet (3 mg) by mouth as needed at bedtime for sleep for up to 15 doses.             oxyCODONE-acetaminophen (Percocet) 5-325 mg tablet 5/2/2024      Sig: Take 1 tablet by mouth every 6 hours if needed for severe pain (7 - 10).   pantoprazole (ProtoNix) 40 mg EC tablet 5/2/2024      Sig: Take 1 tablet (40 mg) by mouth once daily in the morning. Take before meals. Do not crush, chew, or split. Do not start before November 17, 2023.                                     sevelamer carbonate (Renvela) 800 mg tablet 5/2/2024      Sig: Take 3 tablets (2,400 mg) by mouth 3 times a day with meals.   simethicone (Mylicon) 80 mg chewable tablet 5/2/2024      Sig: Chew 1 tablet (80 mg) every 8 hours if needed for flatulence.      Facility-Administered Medications: None      Allergies  Reviewed by Gerardo Mahmood RN on 5/9/2024        Severity Reactions Comments    Cashew Nut High Anaphylaxis, Swelling cashews            Below are additional concerns with the patient's PTA list.  Medication list sent from facility.    Gauri Mcgregor

## 2024-05-09 NOTE — PROGRESS NOTES
"Edwar Hemphill is a 39 y.o. male on day 9 of admission presenting with SIRS (systemic inflammatory response syndrome) (Multi).    Subjective   Persistent back pain  No other complaint     Scheduled medications  [Held by provider] apixaban, 5 mg, oral, BID  atorvastatin, 40 mg, oral, Nightly  B complex-vitamin C-folic acid, 1 capsule, oral, Daily  epoetin tyson or biosimilar, 100 Units/kg, subcutaneous, Every Mon/Wed/Fri  heparin, 3,100 Units, intra-catheter, After Dialysis  heparin, 3,100 Units, intra-catheter, After Dialysis  insulin glargine, 5 Units, subcutaneous, q24h  insulin lispro, 0-5 Units, subcutaneous, TID with meals  levothyroxine, 125 mcg, oral, Daily before breakfast  oxygen, , inhalation, Continuous - Inhalation  pantoprazole, 40 mg, intravenous, BID  polyethylene glycol, 17 g, oral, Daily  sennosides, 8.6 mg, oral, BID  sevelamer carbonate, 2,400 mg, oral, TID with meals      Continuous medications  oxygen, , Last Rate: 2 L/min (05/03/24 1758)      Objective   Physical Exam  Constitutional:       General: He is not in acute distress.  Neurological:      Mental Status: He is alert.         Last Recorded Vitals  Blood pressure 169/82, pulse 98, temperature 37.1 °C (98.8 °F), resp. rate 18, height 1.753 m (5' 9\"), weight 70.5 kg (155 lb 6.8 oz), SpO2 93%.  Intake/Output last 3 Shifts:  I/O last 3 completed shifts:  In: 1200 (17 mL/kg) [I.V.:1200 (17 mL/kg)]  Out: 0 (0 mL/kg)   Weight: 70.5 kg     Relevant Results  Results from last 7 days   Lab Units 05/09/24  0754 05/08/24  2237 05/08/24  1534 05/08/24  1137 05/08/24  0632 05/07/24  2136 05/07/24  0753 05/07/24  0631 05/06/24  1134 05/06/24  0818 05/05/24  0516 05/05/24  0423 05/04/24  1956 05/04/24  1918   POCT GLUCOSE mg/dL 68* 120* 173* 103*  --  107*   < >  --    < >  --    < >  --    < >  --    GLUCOSE mg/dL  --   --   --   --  51*  --   --  114*  --  142*  --  167*  --  174*    < > = values in this interval not displayed.     Assessment/Plan "   Principal Problem:    SIRS (systemic inflammatory response syndrome) (Multi)      IMPRESSION  DIABETIC KETOACIDOSIS, RESOLVED  TYPE 2 DIABETES MELLITUS  LONG TERM CURRENT INSULIN USE  Glucose tight again this morning  History of ketoacidosis without basal insulin        RECOMMENDATIONS  Decrease Insulin glargine to 4 units and move to bedtime tonight  Insulin lispro scale QAC    Mike Santana MD

## 2024-05-09 NOTE — PROGRESS NOTES
Edwar Hemphill is a 39 y.o. male on day 9 of admission presenting with SIRS (systemic inflammatory response syndrome) (Multi).      Subjective   Patient seen and examined.  Transferred to the [facility].   Awake, alert and lucid.   No acute events.       Objective          Vitals 24HR  Heart Rate:  []   Temp:  [36.1 °C (97 °F)-37.1 °C (98.8 °F)]   Resp:  [16-19]   BP: ()/()   SpO2:  [92 %-98 %]     Intake/Output last 3 Shifts:    Intake/Output Summary (Last 24 hours) at 5/9/2024 1129  Last data filed at 5/8/2024 1230  Gross per 24 hour   Intake 600 ml   Output 0 ml   Net 600 ml       Physical Exam  Constitutional: Well developed, awake/alert/oriented  x3   Eyes: Normal conjunctiva    ENMT: mucous membranes moist   Head/Neck: Neck is supple  Difficult to assess JVD   Respiratory/Thorax: Diminished bibasilar breath sounds,  no wheezing, rales or rhonchi   Cardiovascular: regular rhythm, normal  S1 and S2   Gastrointestinal: Nondistended, soft, non-tender,  no rebound tenderness or guarding   Genitourinary: No Castanon   Extremities: Rt leg edema   L AKA   RUE amputation  R femoral HD catheter   Neurological: No asterixis   Psychological: Appropriate mood and behavior   Skin: L AKA stump  Right femoral TDC     Scheduled Medications  [Held by provider] apixaban, 5 mg, oral, BID  atorvastatin, 40 mg, oral, Nightly  B complex-vitamin C-folic acid, 1 capsule, oral, Daily  epoetin tyson or biosimilar, 100 Units/kg, subcutaneous, Every Mon/Wed/Fri  heparin, 3,100 Units, intra-catheter, After Dialysis  heparin, 3,100 Units, intra-catheter, After Dialysis  insulin glargine, 4 Units, subcutaneous, Nightly  insulin lispro, 0-5 Units, subcutaneous, TID with meals  levothyroxine, 125 mcg, oral, Daily before breakfast  oxygen, , inhalation, Continuous - Inhalation  pantoprazole, 40 mg, intravenous, BID  polyethylene glycol, 17 g, oral, Daily  sennosides, 8.6 mg, oral, BID  sevelamer carbonate, 2,400 mg, oral, TID with  meals      Continuous medications  oxygen, , Last Rate: 2 L/min (05/03/24 7798)        PRN medications: cyclobenzaprine, dextrose, dextrose, fentaNYL PF, glucagon, heparin, lidocaine PF, loperamide, midazolam, nitroglycerin, ondansetron, oxyCODONE, oxygen, simethicone, vancomycin     Relevant Results  Results from last 7 days   Lab Units 05/07/24  0631 05/06/24  0818 05/05/24  0423   WBC AUTO x10*3/uL 8.0 9.4 13.0*   HEMOGLOBIN g/dL 9.6* 9.6* 9.7*   HEMATOCRIT % 30.3* 28.6* 28.9*   PLATELETS AUTO x10*3/uL 166 176 205     Results from last 7 days   Lab Units 05/08/24  0632 05/07/24  0631 05/06/24  0818   SODIUM mmol/L 132* 133* 136   POTASSIUM mmol/L 4.6 4.5 4.4   CHLORIDE mmol/L 96* 95* 96*   CO2 mmol/L 23 25 26   BUN mg/dL 64* 56* 82*   CREATININE mg/dL 6.29* 5.14* 6.35*   GLUCOSE mg/dL 51* 114* 142*   CALCIUM mg/dL 8.9 9.0 9.7       MR lumbar spine wo IV contrast   Final Result   Diffusely hypointense bone marrow signal on the T1 weighted images   may be related to renal osteodystrophy. Differential considerations   include anemia, reactive bone marrow or bone marrow infiltrating   process.        No definite imaging findings to suggest discitis osteomyelitis on the   current exam.        Degenerative changes without significant spinal canal stenosis.   Varying degrees of mild neural foraminal narrowing as detailed above.        I personally reviewed the images/study and I agree with the findings   as stated by Dr. Lowe.        MACRO:   None        Signed by: Eva Og 5/4/2024 11:11 AM   Dictation workstation:   [id number]      IR CVC exchange   Final Result   Fluoroscopically guidewire exchange of right groin tunneled   hemodialysis catheter. The newly exchanged catheter is ready for   immediate use.             MACRO:   None        Signed by: Ronaldo Dempsey 5/3/2024 8:09 PM   Dictation workstation:   [id number]      Transthoracic Echo Complete   Final Result      XR chest 1 view   Final Result    Patchy right basilar infiltrate or atelectasis with small right   pleural effusion.   Signed by Joseph Overton MD      CT thoracic spine wo IV contrast   Final Result   Thoracic spine:   1.  Portions of T1 and T2 cough on this study.   2.  No acute osseous findings seen from T3 through T12.   3.  Tiny bilateral effusions with adjacent lower lobe infiltrates.   4.  The distal adenopathy.   5.  Coronary artery calcifications.   Lumbar spine:   1.  No compression deformities or spinal listhesis is noted.   2.  Disc bulging from L3 to S1.  Evaluation for spinal stenosis is   limited due to lack mammographic contrast.   3.  Incidental note of a long dialysis catheter within the inferior   vena cava.   Signed by Deejay Hoffman MD      CT lumbar spine wo IV contrast   Final Result   Thoracic spine:   1.  Portions of T1 and T2 cough on this study.   2.  No acute osseous findings seen from T3 through T12.   3.  Tiny bilateral effusions with adjacent lower lobe infiltrates.   4.  The distal adenopathy.   5.  Coronary artery calcifications.   Lumbar spine:   1.  No compression deformities or spinal listhesis is noted.   2.  Disc bulging from L3 to S1.  Evaluation for spinal stenosis is   limited due to lack mammographic contrast.   3.  Incidental note of a long dialysis catheter within the inferior   vena cava.   Signed by Deejay Hoffman MD      Lower extremity venous duplex right   Final Result      XR pelvis 1-2 views   Final Result   Slightly limited secondary to underlying osteopenia.  No acute osseous   abnormality.   Signed by Roe Ruiz MD      IR CVC exchange    (Results Pending)   Consult to Interventional Radiology    (Results Pending)            Assessment/Plan      Edwar Hemphill is a 38 y.o. male with a past medical history of end-stage renal disease on hemodialysis via right femoral TDC who resides at [facility]. 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  He later had stump debridement on 7/27 with wound VAC placement.  He had polymicrobial infection including resistant E coli, Acinetobacter baumannii and Enterobacter fecalis. He was admitted last September with positive blood cultures growing Acinetobacter baumannii and Stenotrophomonas. He went to IR for dialysis line exchange.  He then was admitted again in November where CT noted left sided fluid collection concerning for abscess.  He grew MRSA and Streptococcus, catheter was exchanged on November 13, he completed vancomycin. He then had Staph aureus CLABSI again in January of this.  POLI negative for endocarditis. HD cath removed 1/16 and replaced on 1/19 to the L groin. IV Vancomycin 750mg with HD through 2/14/24.      He comes in now with fever with low back pain radiating down the right leg. He was feeling too sick and missed HD.  He was noted to be febrile.  Blood cultures were obtained and came back growing MRSA.  Infectious disease is following and managing antimicrobial coverage.  He remains bacteremic with MRSA despite guidewire exchange of the groin tunneled HD catheter on 5/3. MRI of the lumbar spine did not show osteomyelitis/discitis and 2 D ECHO was negative for a vegetation. We dialyzed him yesterday. We will remove his line all together for a line holiday. There is a risk that we may not be able to re-establish access but we have limited options.  We will monitor him closely for RRT needs with the dialysis catheter out. Nephrology will follow closely with his care.        Principal Problem:    SIRS (systemic inflammatory response syndrome) (Multi)      I spent 35 minutes in the professional and overall care of  this patient.      Eber Bruce, DO

## 2024-05-09 NOTE — POST-PROCEDURE NOTE
Interventional Radiology Brief Postprocedure Note    Attending: Henry Clemens CNP    Assistant: none    Diagnosis: bacteremia    Description of procedure: The procedure was explained, risks, benefits and alternatives to the procedure were discussed. Patient signed consent. The old dressing was removed, the site was prepped with chlorhexidine. Using sterile technique, the sutures were removed. 1% lidocaine was administered locally around the catheter cuff. Using forceps, the cuff was freed from the fibrin sheath. The catheter was removed and pressure held at the insertion site. No immediate complications were noted.         Anesthesia: none    Complications: None    Estimated Blood Loss: none    Medications  As of 05/09/24 1254      piperacillin-tazobactam-dextrose (Zosyn) IV 3.375 g (mL/hr) Total dose:  3.38 g* Dosing weight:  65.8   *From user-documented volume     Date/Time Rate/Dose/Volume Action       04/30/24  1411 3.375 g - 100 mL/hr (over 30 min) New Bag      1441 50 mL Stopped               vancomycin (Vancocin) 1,000 mg in dextrose 5% water 200 mL (mL/hr) Total volume:  Not documented* Dosing weight:  65.8   *Total volume has not been documented. View each administration to see the amount administered.     Date/Time Rate/Dose/Volume Action       04/30/24  1451 1,000 mg - 200 mL/hr (over 60 min) New Bag      1551  (over 60 min) Stopped               vancomycin (Vancocin) 1,000 mg in dextrose 5% water 200 mL (mL/hr) Total dose:  1,000 mg* Dosing weight:  65.8   *From user-documented volume     Date/Time Rate/Dose/Volume Action       05/03/24  2155 1,000 mg - 200 mL/hr (over 60 min) - 200 mL New Bag      2255  (over 60 min) Stopped               vancomycin (Vancocin) 1,000 mg in dextrose 5% water 200 mL (mL/hr) Total volume:  Not documented* Dosing weight:  70.5   *Total volume has not been documented. View each administration to see the amount administered.     Date/Time Rate/Dose/Volume Action       05/06/24   2129 1,000 mg - 200 mL/hr (over 60 min) New Bag      2229  (over 60 min) Stopped               acetaminophen (Tylenol) tablet 975 mg (mg) Total dose:  975 mg Dosing weight:  65.8      Date/Time Rate/Dose/Volume Action       04/30/24  1411 975 mg Given               piperacillin-tazobactam-dextrose (Zosyn) IV 2.25 g (g) Total dose:  0 g* Dosing weight:  65.8   *Administration not included in total     Date/Time Rate/Dose/Volume Action       05/01/24  1254 *2.25 g - 100 mL/hr (over 30 min) Missed               piperacillin-tazobactam-dextrose (Zosyn) IV 2.25 g (mL/hr) Total volume:  Not documented* Dosing weight:  65.8   *Total volume has not been documented. View each administration to see the amount administered.     Date/Time Rate/Dose/Volume Action       05/01/24  0521 2.25 g - 100 mL/hr (over 30 min) New Bag      0551  (over 30 min) Stopped               apixaban (Eliquis) tablet 5 mg (mg) Total dose:  35 mg* Dosing weight:  65.8   *Administration not included in total     Date/Time Rate/Dose/Volume Action       04/30/24  2252 5 mg Given     05/01/24  0954 5 mg Given      2212 5 mg Given     05/02/24  0855 5 mg Given      2152 5 mg Given     05/03/24  1329 5 mg Given      2156 5 mg Given     05/04/24  0900 *5 mg Missed      1858 *Not included in total Held by provider      2100 *5 mg Missed     05/05/24 0900 *5 mg Missed      2100 *Not included in total Automatically Held     05/06/24 0900 *5 mg Missed      2100 *5 mg Missed     05/07/24  0900 *5 mg Missed      2100 *5 mg Missed     05/08/24  0900 *5 mg Missed      2100 *5 mg Missed     05/09/24  0900 *5 mg Missed               atorvastatin (Lipitor) tablet 40 mg (mg) Total dose:  200 mg* Dosing weight:  65.8   *Administration not included in total     Date/Time Rate/Dose/Volume Action       04/30/24  2100 *40 mg Missed     05/01/24  2100 *40 mg Missed     05/02/24  2152 40 mg Given     05/03/24  2156 40 mg Given     05/04/24  2100 *40 mg Missed     05/05/24   2100 *40 mg Missed     05/06/24  2128 40 mg Given     05/07/24  2130 40 mg Given     05/08/24  2201 40 mg Given               B complex-vitamin C-folic acid (Nephrocaps) capsule 1 capsule (capsule) Total dose:  6 capsule* Dosing weight:  65.8   *Administration not included in total     Date/Time Rate/Dose/Volume Action       04/30/24  2045 *1 capsule Missed     05/01/24  0900 1 capsule Given     05/02/24  0854 1 capsule Given     05/03/24  0900 *1 capsule Missed     05/04/24  0900 *1 capsule Missed     05/05/24  0900 *1 capsule Missed     05/06/24  0825 1 capsule Given     05/07/24  0909 1 capsule Given     05/08/24  0911 1 capsule Given     05/09/24  0859 1 capsule Given               levothyroxine (Synthroid, Levoxyl) tablet 125 mcg (mcg) Total dose:  1,000 mcg* Dosing weight:  65.8   *Administration not included in total     Date/Time Rate/Dose/Volume Action       05/01/24  0630 125 mcg Given     05/02/24  0646 *125 mcg Missed     05/03/24  0610 125 mcg Given     05/04/24  0624 125 mcg Given     05/05/24  0621 125 mcg Given     05/06/24  0601 125 mcg Given     05/07/24  0909 125 mcg Given     05/08/24  0912 125 mcg Given     05/09/24  0608 125 mcg Given               polyethylene glycol (Glycolax, Miralax) packet 17 g (g) Total dose:  34 g* Dosing weight:  65.8   *Administration not included in total     Date/Time Rate/Dose/Volume Action       05/01/24  0700 *17 g Missed     05/02/24  0646 17 g Given     05/03/24  0700 *17 g Missed     05/04/24  0624 17 g Given     05/05/24  0621 *17 g Missed     05/06/24  0700 *17 g Missed     05/07/24  0700 *17 g Missed     05/08/24  0700 *17 g Missed     05/09/24  0900 *17 g Missed               sennosides (Senokot) tablet 8.6 mg (mg) Total dose:  17.2 mg* Dosing weight:  65.8   *Administration not included in total     Date/Time Rate/Dose/Volume Action       04/30/24  2100 *8.6 mg Missed     05/01/24  0900 *8.6 mg Missed      2100 *8.6 mg Missed     05/02/24  0859 *8.6 mg  Missed      2100 *8.6 mg Missed     05/03/24  0900 *8.6 mg Missed      2156 8.6 mg Given     05/04/24  0900 *8.6 mg Missed      2100 *8.6 mg Missed     05/05/24  0900 *8.6 mg Missed      2100 *8.6 mg Missed     05/06/24  0824 8.6 mg Given      2100 *8.6 mg Missed     05/07/24  0900 *8.6 mg Missed      2130 *8.6 mg Missed     05/08/24  0900 *8.6 mg Missed      2100 *8.6 mg Missed     05/09/24  0900 *8.6 mg Missed               sevelamer carbonate (Renvela) tablet 2,400 mg (mg) Total dose:  16,800 mg* Dosing weight:  65.8   *Administration not included in total     Date/Time Rate/Dose/Volume Action       05/01/24  0954 *2,400 mg Missed      1200 *2,400 mg Missed      1823 *2,400 mg Missed     05/02/24  0855 *2,400 mg Missed      1150 2,400 mg Given      1700 *2,400 mg Missed     05/03/24  0800 *2,400 mg Missed      1200 *2,400 mg Missed      1700 *2,400 mg Missed     05/04/24  0800 *2,400 mg Missed      1200 *2,400 mg Missed      1700 *2,400 mg Missed     05/05/24  0800 *2,400 mg Missed      1200 *2,400 mg Missed      1644 2,400 mg Given     05/06/24  0824 2,400 mg Given      1200 *2,400 mg Missed      1700 *2,400 mg Missed     05/07/24  0909 *2,400 mg Missed      1311 2,400 mg Given      1703 2,400 mg Given     05/08/24  0800 *2,400 mg Missed      1200 *2,400 mg Missed      1731 2,400 mg Given     05/09/24  0859 2,400 mg Given               insulin lispro (HumaLOG) injection 0-5 Units (Units) Total dose:  23 Units Dosing weight:  65.8      Date/Time Rate/Dose/Volume Action       05/01/24  0955 5 Units Given      1253 5 Units Given      1824 5 Units Given     05/02/24  0856 3 Units Given      1302 3 Units Given      1831 2 Units Given     05/03/24  0800 *Not included in total Missed      1200 *Not included in total Missed      1700 *Not included in total Missed     05/04/24  0800 *Not included in total Missed               insulin lispro (HumaLOG) injection 5 Units (Units) Total dose:  5 Units Dosing weight:  65.8       Date/Time Rate/Dose/Volume Action       05/01/24  1318 5 Units Given               insulin lispro (HumaLOG) injection 6 Units (Units) Total dose:  6 Units Dosing weight:  65.8      Date/Time Rate/Dose/Volume Action       05/04/24  0905 6 Units Given               insulin lispro (HumaLOG) injection 0-5 Units (Units) Total dose:  4 Units Dosing weight:  70.5      Date/Time Rate/Dose/Volume Action       05/05/24  1330 1 Units Given      1644 1 Units Given     05/06/24  0800 *Not included in total Missed      1200 *Not included in total Missed      1700 *Not included in total Missed     05/07/24  0800 *Not included in total Missed      1311 1 Units Given      1700 *Not included in total Missed     05/08/24  0800 *Not included in total Missed      1200 *Not included in total Missed      1732 1 Units Given     05/09/24  0800 *Not included in total Missed               oxyCODONE (Roxicodone) immediate release tablet 5 mg (mg) Total dose:  75 mg Dosing weight:  65.8      Date/Time Rate/Dose/Volume Action       04/30/24  2252 5 mg Given     05/01/24  0520 5 mg Given     05/02/24  2155 5 mg Given     05/03/24  1029 5 mg Given     05/05/24  2203 5 mg Given     05/06/24  0823 5 mg Given      1240 5 mg Given      2128 5 mg Given     05/07/24  0929 5 mg Given      1425 5 mg Given      2130 5 mg Given     05/08/24  0913 5 mg Given      1326 5 mg Given      2201 5 mg Given     05/09/24  0859 5 mg Given               vancomycin in dextrose 5 % (Vancocin) IVPB 500 mg (mL/hr) Total dose:  500 mg* Dosing weight:  65.8   *From user-documented volume     Date/Time Rate/Dose/Volume Action       05/02/24  0253 500 mg - 200 mL/hr (over 30 min) New Bag      0323 100 mL Stopped               heparin 1,000 unit/mL injection 3,100 Units (Units) Total dose:  12,400 Units* Dosing weight:  65.8   *Administration not included in total     Date/Time Rate/Dose/Volume Action       05/02/24  0248 3,100 Units Given     05/03/24  1210 3,100 Units  Given     05/05/24  0045 *3,100 Units Missed     05/06/24  1030 3,100 Units Given     05/08/24  0045 *3,100 Units Missed      1257 3,100 Units Given               heparin 1,000 unit/mL injection 3,100 Units (Units) Total dose:  12,400 Units* Dosing weight:  65.8   *Administration not included in total     Date/Time Rate/Dose/Volume Action       05/02/24  0247 3,100 Units Given     05/03/24  1210 3,100 Units Given     05/05/24  0045 *3,100 Units Missed     05/06/24  1029 3,100 Units Given     05/08/24  0045 *3,100 Units Missed      1256 3,100 Units Given               heparin 1,000 unit/mL injection (Units) Total dose:  4,800 Units      Date/Time Rate/Dose/Volume Action       05/03/24  1823 2,400 Units Given      1824 2,400 Units Given               oxygen (O2) therapy (L/min) Total volume:  Not documented*   *Total volume has not been documented. View each administration to see the amount administered.     Date/Time Rate/Dose/Volume Action       05/03/24  1758 2 L/min - 120,000 mL/hr New Bag               oxygen (O2) therapy (L/min) Total volume:  Not documented* Dosing weight:  65.8   *Total volume has not been documented. View each administration to see the amount administered.     Date/Time Rate/Dose/Volume Action       05/04/24  1345 2 L/min Start      2000 2 L/min Rate Verify Medical Gas     05/05/24  0800 2 L/min Rate Verify Medical Gas     05/08/24  0800 21 percent Start     05/09/24  0810 21 percent Start               fentaNYL PF (Sublimaze) injection (mcg) Total dose:  50 mcg      Date/Time Rate/Dose/Volume Action       05/03/24 1816 50 mcg Given               midazolam (Versed) injection (mg) Total dose:  1 mg      Date/Time Rate/Dose/Volume Action       05/03/24 1816 1 mg Given               lidocaine PF (Xylocaine) 10 mg/mL (1 %) injection (mL) Total volume:  5 mL      Date/Time Rate/Dose/Volume Action       05/03/24 1816 5 mL Given               insulin regular (HumuLIN, NovoLIN) bolus from bag 0-10  Units (Units) Total dose:  Cannot be calculated* Dosing weight:  65.8   *Total user-documented volume 354.47 mL may contain volume from other administrations     Date/Time Rate/Dose/Volume Action       05/04/24  1045 0 Units (over 2 min) Bolus from Bag               insulin regular 100 unit/100 mL (1 unit/mL) in 0.9 % NaCl infusion (Units/hr) Total dose:  Cannot be calculated* Dosing weight:  65.8   *Total user-documented volume 354.47 mL may contain volume from other administrations     Date/Time Rate/Dose/Volume Action       05/04/24  1102 6 Units/hr - 6 mL/hr New Bag      1153 9 Units/hr - 9 mL/hr Rate Change      1317 13.5 Units/hr - 13.5 mL/hr Rate Change      1422 13.5 Units/hr - 13.5 mL/hr Rate Change      1501 17 Units/hr - 17 mL/hr Rate Change      1614 17 Units/hr - 17 mL/hr Rate Verify      1725 17 Units/hr - 17 mL/hr New Bag      1803 17 Units/hr - 17 mL/hr Rate Change      1856 17 Units/hr - 17 mL/hr Rate Change      2009 12 Units/hr - 12 mL/hr Rate Change      2114 12 Units/hr - 12 mL/hr New Bag      2120 8 Units/hr - 8 mL/hr Rate Change      2220  Stopped     05/05/24  0002 3 Units/hr - 3 mL/hr Restarted      0545 3 Units/hr - 3 mL/hr Rate Verify      0700 3 Units/hr - 3 mL/hr Rate Verify      0800  Stopped               lactated Ringer's bolus 500 mL (mL/hr) Total volume:  Not documented* Dosing weight:  65.8   *Total volume has not been documented. View each administration to see the amount administered.     Date/Time Rate/Dose/Volume Action       05/04/24  1104 500 mL - 250 mL/hr (over 120 min) New Bag      1304  (over 120 min) Stopped               lactated Ringer's bolus 1,000 mL (mL/hr) Total volume:  Not documented* Dosing weight:  65.8   *Total volume has not been documented. View each administration to see the amount administered.     Date/Time Rate/Dose/Volume Action       05/04/24  1347 1,000 mL - 500 mL/hr (over 120 min) New Bag      1547  (over 120 min) Stopped               lactated  Ringer's bolus 500 mL (mL/hr) Total volume:  579.17 mL* Dosing weight:  70.5   *From user-documented volume     Date/Time Rate/Dose/Volume Action       05/05/24  1352 500 mL - 250 mL/hr (over 120 min) New Bag      1552  (over 120 min) Stopped      1611 579.17 mL                metoprolol tartrate (Lopressor) injection 5 mg (mg) Total dose:  2.5 mg Dosing weight:  65.8      Date/Time Rate/Dose/Volume Action       05/04/24  1105 2.5 mg Given               metoprolol tartrate (Lopressor) injection  - Omnicell Override Pull (mg) Total dose:  2.5 mg      Date/Time Rate/Dose/Volume Action       05/04/24  1105 2.5 mg Given               insulin regular (HumuLIN R,NovoLIN R) injection 10 Units (Units) Total dose:  10 Units Dosing weight:  65.8      Date/Time Rate/Dose/Volume Action       05/04/24  1045 10 Units Given               insulin regular (HumuLIN R,NovoLIN R) injection  - Omnicell Override Pull (Units) Total dose:  10 Units      Date/Time Rate/Dose/Volume Action       05/04/24  1045 10 Units Given               norepinephrine (Levophed) 8 mg in dextrose 5% 250 mL (0.032 mg/mL) infusion (premix) (mcg/kg/min) Total dose:  Cannot be calculated* Dosing weight:  65.8   *Total user-documented volume 290.2 mL may contain volume from other administrations     Date/Time Rate/Dose/Volume Action       05/04/24  1103 0.01 mcg/kg/min - 1.23 mL/hr New Bag      1150 0.02 mcg/kg/min - 2.47 mL/hr Rate Change      1153 0.03 mcg/kg/min - 3.7 mL/hr Rate Change      1228 0.04 mcg/kg/min - 4.94 mL/hr Rate Change      1508 0.03 mcg/kg/min - 3.7 mL/hr Rate Change      1630 0.04 mcg/kg/min - 4.94 mL/hr Rate Change      1645 0.05 mcg/kg/min - 6.17 mL/hr Rate Change      1732 0.06 mcg/kg/min - 7.4 mL/hr Rate Change      2125 0.06 mcg/kg/min - 7.4 mL/hr Rate Verify     05/05/24  0545 0.06 mcg/kg/min - 7.4 mL/hr Rate Verify      0700 0.06 mcg/kg/min - 7.4 mL/hr Rate Verify      0800 0.06 mcg/kg/min - 7.4 mL/hr Rate Verify      0900 0.05  mcg/kg/min - 6.17 mL/hr Rate Change      1030 0.04 mcg/kg/min - 4.94 mL/hr Rate Change      1200 0.03 mcg/kg/min - 3.7 mL/hr Rate Change      1230 0.02 mcg/kg/min - 2.47 mL/hr Rate Change      1330 0.01 mcg/kg/min - 1.23 mL/hr Rate Change      1352  Stopped               norepinephrine in dextrose 5 % (Levophed) infusion  - Omnicell Override Pull (mcg/kg/min) Total dose:  Cannot be calculated*   *Total user-documented volume 290.2 mL may contain volume from other administrations     Date/Time Rate/Dose/Volume Action       05/04/24  1103 0.01 mcg/kg/min - 1.23 mL/hr New Bag               dextrose 5 % and lactated Ringer's infusion (mL/hr) Total volume:  2,000 mL* Dosing weight:  65.8   *From user-documented volume     Date/Time Rate/Dose/Volume Action       05/04/24  1624  Held Per Protocol      1730 100 mL/hr New Bag      2125 100 mL/hr - 391.67 mL Rate Verify     05/05/24  0305 100 mL/hr - 566.67 mL New Bag      0545 100 mL/hr - 266.67 mL Rate Verify      0700 100 mL/hr - 125 mL Rate Verify      1606 649.99 mL Stopped               pantoprazole (ProtoNix) injection 40 mg (mg) Total dose:  400 mg Dosing weight:  65.8      Date/Time Rate/Dose/Volume Action       05/04/24  1949 40 mg Given     05/05/24  0845 40 mg Given      2019 40 mg Given     05/06/24  0825 40 mg Given      2129 40 mg Given     05/07/24  0909 40 mg Given      2130 40 mg Given     05/08/24  0913 40 mg Given      2201 40 mg Given     05/09/24  1019 40 mg Given               potassium chloride 20 mEq in 100 mL IV premix (mL/hr) Total dose:  20 mEq* Dosing weight:  65.8   *From user-documented volume     Date/Time Rate/Dose/Volume Action       05/04/24  2020 20 mEq - 50 mL/hr (over 120 min) New Bag      2220 100 mL Stopped               potassium chloride IVPB  - Omnicell Override Pull (mL/hr) Total volume:  Not documented*   *Total volume has not been documented. View each administration to see the amount administered.     Date/Time Rate/Dose/Volume  Action       05/04/24  2020 20 mEq - 50 mL/hr (over 120 min) New Bag      2220  (over 120 min) Stopped               insulin glargine (Lantus) injection 10 Units (Units) Total dose:  30 Units Dosing weight:  70.5      Date/Time Rate/Dose/Volume Action       05/05/24  0904 10 Units Given     05/06/24  0829 10 Units Given     05/07/24  0910 10 Units Given               insulin glargine (Lantus) injection 5 Units (Units) Total dose:  5 Units* Dosing weight:  70.5   *Administration not included in total     Date/Time Rate/Dose/Volume Action       05/08/24  0915 5 Units Given     05/09/24  0915 *5 Units Missed               epoetin tyson-epbx (Retacrit) injection 8,000 Units (Units) Total dose:  16,000 Units Dosing weight:  70.5      Date/Time Rate/Dose/Volume Action       05/06/24  1818 8,000 Units Given     05/08/24  1731 8,000 Units Given               cyclobenzaprine (Flexeril) tablet 5 mg (mg) Total dose:  30 mg Dosing weight:  70.5      Date/Time Rate/Dose/Volume Action       05/06/24  1455 5 mg Given     05/07/24  0019 5 mg Given      1425 5 mg Given      2130 5 mg Given     05/08/24  1102 5 mg Given     05/09/24  1026 5 mg Given               HYDROmorphone (Dilaudid) injection 0.5 mg (mg) Total dose:  0.5 mg Dosing weight:  70.5      Date/Time Rate/Dose/Volume Action       05/08/24  1731 0.5 mg Given                   * Cannot find log *      See detailed result report with images in PACS.    The patient tolerated the procedure well without incident or complication and is in stable condition.

## 2024-05-10 LAB
ANION GAP SERPL CALC-SCNC: 14 MMOL/L (ref 10–20)
BACTERIA BLD AEROBE CULT: ABNORMAL
BACTERIA BLD AEROBE CULT: ABNORMAL
BACTERIA BLD CULT: ABNORMAL
BACTERIA BLD CULT: ABNORMAL
BUN SERPL-MCNC: 43 MG/DL (ref 6–23)
CALCIUM SERPL-MCNC: 9 MG/DL (ref 8.6–10.3)
CHLORIDE SERPL-SCNC: 96 MMOL/L (ref 98–107)
CO2 SERPL-SCNC: 27 MMOL/L (ref 21–32)
CREAT SERPL-MCNC: 5.78 MG/DL (ref 0.5–1.3)
EGFRCR SERPLBLD CKD-EPI 2021: 12 ML/MIN/1.73M*2
ERYTHROCYTE [DISTWIDTH] IN BLOOD BY AUTOMATED COUNT: 18.7 % (ref 11.5–14.5)
GLUCOSE BLD MANUAL STRIP-MCNC: 108 MG/DL (ref 74–99)
GLUCOSE BLD MANUAL STRIP-MCNC: 119 MG/DL (ref 74–99)
GLUCOSE BLD MANUAL STRIP-MCNC: 197 MG/DL (ref 74–99)
GLUCOSE BLD MANUAL STRIP-MCNC: 221 MG/DL (ref 74–99)
GLUCOSE SERPL-MCNC: 206 MG/DL (ref 74–99)
GRAM STN SPEC: ABNORMAL
HCT VFR BLD AUTO: 23.8 % (ref 41–52)
HGB BLD-MCNC: 7.7 G/DL (ref 13.5–17.5)
HOLD SPECIMEN: NORMAL
MCH RBC QN AUTO: 28.1 PG (ref 26–34)
MCHC RBC AUTO-ENTMCNC: 32.4 G/DL (ref 32–36)
MCV RBC AUTO: 87 FL (ref 80–100)
NRBC BLD-RTO: 0 /100 WBCS (ref 0–0)
PLATELET # BLD AUTO: 236 X10*3/UL (ref 150–450)
POTASSIUM SERPL-SCNC: 4.8 MMOL/L (ref 3.5–5.3)
RBC # BLD AUTO: 2.74 X10*6/UL (ref 4.5–5.9)
SODIUM SERPL-SCNC: 132 MMOL/L (ref 136–145)
VANCOMYCIN SERPL-MCNC: 21 UG/ML (ref 5–20)
WBC # BLD AUTO: 7.3 X10*3/UL (ref 4.4–11.3)

## 2024-05-10 PROCEDURE — 99231 SBSQ HOSP IP/OBS SF/LOW 25: CPT | Performed by: INTERNAL MEDICINE

## 2024-05-10 PROCEDURE — C9113 INJ PANTOPRAZOLE SODIUM, VIA: HCPCS

## 2024-05-10 PROCEDURE — 2500000001 HC RX 250 WO HCPCS SELF ADMINISTERED DRUGS (ALT 637 FOR MEDICARE OP): Performed by: FAMILY MEDICINE

## 2024-05-10 PROCEDURE — 85027 COMPLETE CBC AUTOMATED: CPT | Performed by: PHYSICIAN ASSISTANT

## 2024-05-10 PROCEDURE — 80048 BASIC METABOLIC PNL TOTAL CA: CPT | Performed by: INTERNAL MEDICINE

## 2024-05-10 PROCEDURE — 6350000001 HC RX 635 EPOETIN >10,000 UNITS: Performed by: INTERNAL MEDICINE

## 2024-05-10 PROCEDURE — 80202 ASSAY OF VANCOMYCIN: CPT | Performed by: PHARMACIST

## 2024-05-10 PROCEDURE — 2500000002 HC RX 250 W HCPCS SELF ADMINISTERED DRUGS (ALT 637 FOR MEDICARE OP, ALT 636 FOR OP/ED): Performed by: SURGERY

## 2024-05-10 PROCEDURE — 2500000001 HC RX 250 WO HCPCS SELF ADMINISTERED DRUGS (ALT 637 FOR MEDICARE OP): Performed by: NURSE PRACTITIONER

## 2024-05-10 PROCEDURE — 82947 ASSAY GLUCOSE BLOOD QUANT: CPT

## 2024-05-10 PROCEDURE — 87040 BLOOD CULTURE FOR BACTERIA: CPT | Mod: AHULAB | Performed by: INTERNAL MEDICINE

## 2024-05-10 PROCEDURE — 36415 COLL VENOUS BLD VENIPUNCTURE: CPT | Performed by: INTERNAL MEDICINE

## 2024-05-10 PROCEDURE — 2500000006 HC RX 250 W HCPCS SELF ADMINISTERED DRUGS (ALT 637 FOR ALL PAYERS): Performed by: NURSE PRACTITIONER

## 2024-05-10 PROCEDURE — 2500000004 HC RX 250 GENERAL PHARMACY W/ HCPCS (ALT 636 FOR OP/ED)

## 2024-05-10 PROCEDURE — 1100000001 HC PRIVATE ROOM DAILY

## 2024-05-10 PROCEDURE — 2500000005 HC RX 250 GENERAL PHARMACY W/O HCPCS: Performed by: SURGERY

## 2024-05-10 RX ADMIN — INSULIN LISPRO 1 UNITS: 100 INJECTION, SOLUTION INTRAVENOUS; SUBCUTANEOUS at 10:48

## 2024-05-10 RX ADMIN — EPOETIN ALFA-EPBX 8000 UNITS: 10000 INJECTION, SOLUTION INTRAVENOUS; SUBCUTANEOUS at 17:24

## 2024-05-10 RX ADMIN — LEVOTHYROXINE SODIUM 125 MCG: 125 TABLET ORAL at 06:12

## 2024-05-10 RX ADMIN — PANTOPRAZOLE SODIUM 40 MG: 40 INJECTION, POWDER, FOR SOLUTION INTRAVENOUS at 22:30

## 2024-05-10 RX ADMIN — ATORVASTATIN CALCIUM 40 MG: 40 TABLET, FILM COATED ORAL at 21:00

## 2024-05-10 RX ADMIN — OXYCODONE HYDROCHLORIDE 5 MG: 5 TABLET ORAL at 22:30

## 2024-05-10 RX ADMIN — NEPHROCAP 1 CAPSULE: 1 CAP ORAL at 10:47

## 2024-05-10 RX ADMIN — SEVELAMER CARBONATE 2400 MG: 800 TABLET, FILM COATED ORAL at 12:52

## 2024-05-10 RX ADMIN — INSULIN LISPRO 2 UNITS: 100 INJECTION, SOLUTION INTRAVENOUS; SUBCUTANEOUS at 12:53

## 2024-05-10 RX ADMIN — SEVELAMER CARBONATE 2400 MG: 800 TABLET, FILM COATED ORAL at 17:25

## 2024-05-10 RX ADMIN — CYCLOBENZAPRINE HYDROCHLORIDE 5 MG: 5 TABLET, FILM COATED ORAL at 22:31

## 2024-05-10 RX ADMIN — Medication 2 L/MIN: at 20:00

## 2024-05-10 RX ADMIN — OXYCODONE HYDROCHLORIDE 5 MG: 5 TABLET ORAL at 17:24

## 2024-05-10 RX ADMIN — OXYCODONE HYDROCHLORIDE 5 MG: 5 TABLET ORAL at 09:11

## 2024-05-10 ASSESSMENT — PAIN SCALES - GENERAL
PAINLEVEL_OUTOF10: 10 - WORST POSSIBLE PAIN
PAINLEVEL_OUTOF10: 8
PAINLEVEL_OUTOF10: 10 - WORST POSSIBLE PAIN
PAINLEVEL_OUTOF10: 3
PAINLEVEL_OUTOF10: 3

## 2024-05-10 ASSESSMENT — PAIN DESCRIPTION - LOCATION: LOCATION: BACK

## 2024-05-10 ASSESSMENT — PAIN - FUNCTIONAL ASSESSMENT: PAIN_FUNCTIONAL_ASSESSMENT: 0-10

## 2024-05-10 NOTE — CARE PLAN
The patient's goals for the shift include  Pt wants to rest     The clinical goals for the shift include pt to have no c/o pain throughout the shift        Problem: Safety  Goal: Patient will be injury free during hospitalization  Outcome: Progressing  Goal: I will remain free of falls  Outcome: Progressing     Problem: Daily Care  Goal: Daily care needs are met  Outcome: Progressing     Problem: Psychosocial Needs  Goal: Demonstrates ability to cope with hospitalization/illness  Outcome: Progressing  Goal: Collaborate with me, my family, and caregiver to identify my specific goals  Outcome: Progressing

## 2024-05-10 NOTE — PROGRESS NOTES
05/10/24 1054   Discharge Planning   Patient expects to be discharged to: [facility]     Patient will discharge back to LTC at [facility] once med clear, currently on line holiday for HD due to HD cath being source for positive CLABSI with positive blood cx, nephro following, will continue to monitor discharge planning.

## 2024-05-10 NOTE — PROGRESS NOTES
Edwar Hemphill is a 39 y.o. male on day 9 of admission presenting with SIRS (systemic inflammatory response syndrome) (Multi).      Subjective   Chart rev       Objective     Last Recorded Vitals  /70 (Patient Position: Lying)   Pulse 105   Temp 36.8 °C (98.2 °F)   Resp 16   Wt 70.5 kg (155 lb 6.8 oz)   SpO2 95%   Intake/Output last 3 Shifts:  No intake or output data in the 24 hours ending 05/09/24 2115    Admission Weight  Weight: 65.8 kg (145 lb) (04/30/24 0825)    Daily Weight  05/08/24 : 70.5 kg (155 lb 6.8 oz)    Image Results  MR lumbar spine wo IV contrast  Narrative: Interpreted By:  Eva Og,  and Mynor Hernandez   STUDY:  MR LUMBAR SPINE WO IV CONTRAST      INDICATION:  Signs/Symptoms:back pain new with bacteremia, possible infection      COMPARISON:  CT of the lumbar spine 04/30/2024. Body CT 01/14/2024.      ACCESSION NUMBER(S):  [id number]      ORDERING CLINICIAN:  ALKA VIEYRA      TECHNIQUE:  Sagittal T1, sagittal T2, sagittal STIR, axial T1 and axial T2  weighted MRI images of the lumbar spine were obtained without  intravenous contrast administration.      FINDINGS:  Image quality is somewhat degraded due to motion and technique.      There are 5 non-rib-bearing lumbar-type vertebral bodies. The last  well-formed intervertebral disc is labeled L5-S1.      Minimal retrolisthesis at L5-S1. Alignment is otherwise normal.      Vertebral body heights are maintained. Disc spaces are relatively  maintained.      There is diffusely hypointense bone marrow signal on the T1 weighted  images which may be related to renal osteodystrophy. Differential  considerations include anemia, reactive marrow or bone marrow  infiltrating process.      There is no increased STIR signal to suggest marrow edema. Multilevel  degenerative endplate changes include prominent Schmorl's nodes most  pronounced at L3-4 where there are Modic type 2 changes and central  intradiscal T1 and T2 hypointense signal  likely degenerative.      The conus medullaris terminates at L1-2. The visualized spinal cord,  conus medullaris and cauda equina demonstrate normal signal and  morphology.      Fatty atrophic changes involve the paravertebral musculature.  Partially visualized kidneys demonstrate atrophic changes with  multiple bilateral cysts, better characterized on previous body CT  dated 01/14/2024. Partially visualized catheter within the inferior  vena cava and right common iliac vein.      T10-11 on sagittal images only: No spinal canal or neural foraminal  stenosis.      T11-12 on sagittal images only: No spinal canal or neural foraminal  stenosis.      T12-L1: Facet arthropathy and ligamentum flavum thickening. No spinal  canal or neural foraminal stenosis.      L1-2: Facet arthropathy and ligamentum flavum thickening. No spinal  canal or neural foraminal stenosis.      L2-3: Facet arthropathy and ligamentum flavum thickening. No spinal  canal or neural foraminal stenosis.      L3-4: Disc bulge, facet arthropathy and ligamentum flavum thickening.  No spinal canal or neural foraminal stenosis.      L4-5: Disc bulge with small central protrusion, facet arthropathy and  ligamentum flavum thickening. No spinal canal stenosis. Mild  bilateral neural foraminal stenosis.      L5-S1: Disc bulge, facet arthropathy and ligamentum flavum  thickening. No spinal canal stenosis. Mild right and no left neural  foraminal stenosis.      Degenerative changes are noted at the sacroiliac joints.      Impression: Diffusely hypointense bone marrow signal on the T1 weighted images  may be related to renal osteodystrophy. Differential considerations  include anemia, reactive bone marrow or bone marrow infiltrating  process.      No definite imaging findings to suggest discitis osteomyelitis on the  current exam.      Degenerative changes without significant spinal canal stenosis.  Varying degrees of mild neural foraminal narrowing as detailed  above.      I personally reviewed the images/study and I agree with the findings  as stated by Dr. Lowe.      MACRO:  None      Signed by: Eva Og 5/4/2024 11:11 AM  Dictation workstation:   [id number]      PHYSICAL EXAM  NEUROLOGICAL: No abnormal movements, no changes from before  HEENT: Head atraumatic, perrl,eomi, no oral lesions, no ear rash   NECK: Supple, no ln, jvp flat, thyroid palp  HEART: S1, S2, no added sound  LUNGS: dec a/e  EXTREMITIES: no edema  ABDOMEN: Soft, nontender, bowel sound positive, no organomegaly  SKIN: No change  Relevant Results               Assessment/Plan            Principal Problem:    SIRS (systemic inflammatory response syndrome) (Multi)    Esrd  Bc still +  T2dm  Pvd    PLAN  Cont iv vanco  May need line holiday  [id number]              Mauricio Estrada MD

## 2024-05-10 NOTE — PROGRESS NOTES
"Edwar Hemphill is a 39 y.o. male on day 10 of admission presenting with SIRS (systemic inflammatory response syndrome) (Multi).    Subjective   No new complaint     Scheduled medications  [Held by provider] apixaban, 5 mg, oral, BID  atorvastatin, 40 mg, oral, Nightly  B complex-vitamin C-folic acid, 1 capsule, oral, Daily  epoetin tyson or biosimilar, 100 Units/kg, subcutaneous, Every Mon/Wed/Fri  heparin, 3,100 Units, intra-catheter, After Dialysis  heparin, 3,100 Units, intra-catheter, After Dialysis  insulin glargine, 4 Units, subcutaneous, Nightly  insulin lispro, 0-5 Units, subcutaneous, TID with meals  levothyroxine, 125 mcg, oral, Daily before breakfast  oxygen, , inhalation, Continuous - Inhalation  pantoprazole, 40 mg, intravenous, BID  polyethylene glycol, 17 g, oral, Daily  sennosides, 8.6 mg, oral, BID  sevelamer carbonate, 2,400 mg, oral, TID with meals      Continuous medications  oxygen, , Last Rate: 2 L/min (05/03/24 1758)      Objective   Physical Exam  Constitutional:       General: He is not in acute distress.  Neurological:      Mental Status: He is alert.         Last Recorded Vitals  Blood pressure 106/64, pulse 102, temperature 36.8 °C (98.2 °F), temperature source Oral, resp. rate 18, height 1.753 m (5' 9\"), weight 70.5 kg (155 lb 6.8 oz), SpO2 96%.  Intake/Output last 3 Shifts:  No intake/output data recorded.    Relevant Results  Results from last 7 days   Lab Units 05/10/24  0744 05/10/24  0527 05/09/24  2124 05/09/24  1550 05/09/24  1330 05/09/24  0754 05/08/24  1137 05/08/24  0632 05/07/24  0753 05/07/24  0631 05/06/24  1134 05/06/24  0818 05/05/24  0516 05/05/24  0423   POCT GLUCOSE mg/dL 197*  --  197* 165* 146* 68*   < >  --    < >  --    < >  --    < >  --    GLUCOSE mg/dL  --  206*  --   --   --   --   --  51*  --  114*  --  142*  --  167*    < > = values in this interval not displayed.       Assessment/Plan   Principal Problem:    SIRS (systemic inflammatory response syndrome) " (Multi)      IMPRESSION  DIABETIC KETOACIDOSIS, RESOLVED  TYPE 2 DIABETES MELLITUS  LONG TERM CURRENT INSULIN USE  Glucose improved, no further hypoglycemia  History of ketoacidosis without basal insulin        RECOMMENDATIONS  Continue Insulin glargine4 units and move to bedtime tonight  Insulin lispro scale QA      Mike Santana MD

## 2024-05-10 NOTE — PROGRESS NOTES
"  INFECTIOUS DISEASE DAILY PROGRESS NOTE    SUBJECTIVE:    No new events or complaints. Feels OK today. Denies pain. No fevers. HD cath was removed.    OBJECTIVE:  VITALS (Last 24 Hours)  /64 (BP Location: Left arm, Patient Position: Lying)   Pulse 102   Temp 36.8 °C (98.2 °F) (Oral)   Resp 18   Ht 1.753 m (5' 9\")   Wt 70.5 kg (155 lb 6.8 oz)   SpO2 96%   BMI 22.95 kg/m²     PHYSICAL EXAM:  Gen - NAD, seems OK  Abd - soft, no ttp, BS present, no distension  RLE - s/p AKA  RUE - s/p amputation  Groin - femoral HD catheter since removed  Skin - no rash    ABX: IV Vancomycin    LABS:  Lab Results   Component Value Date    WBC 7.3 05/10/2024    HGB 7.7 (L) 05/10/2024    HCT 23.8 (L) 05/10/2024    MCV 87 05/10/2024     05/10/2024     Lab Results   Component Value Date    GLUCOSE 206 (H) 05/10/2024    CALCIUM 9.0 05/10/2024     (L) 05/10/2024    K 4.8 05/10/2024    CO2 27 05/10/2024    CL 96 (L) 05/10/2024    BUN 43 (H) 05/10/2024    CREATININE 5.78 (H) 05/10/2024           Estimated Creatinine Clearance: 16.9 mL/min (A) (by C-G formula based on SCr of 5.78 mg/dL (H)).    IMAGING:  MRI L-spine 5/3  IMPRESSION:  Diffusely hypointense bone marrow signal on the T1 weighted images  may be related to renal osteodystrophy. Differential considerations  include anemia, reactive bone marrow or bone marrow infiltrating  process.    No definite imaging findings to suggest discitis osteomyelitis on the  current exam.    Degenerative changes without significant spinal canal stenosis.  Varying degrees of mild neural foraminal narrowing as detailed above.      ASSESSMENT/PLAN:     CLABSI (HD cath POA) due to MRSA - line exchanged 5/3. Not having a full line holiday because of risk of losing HD access. TTE without endocarditis. Repeat blood cx 5/5 and 5/7 still positive. HD cath removed on 5/9.  Lumbar Spine Pain - CT without epidural abscess. MRI without OM/discitis.  DM II with DKA - DKA resolved    IV " Vancomycin. Vanc therapeutic to above goal so lack of abx is certainly not the cause for persistent bacteremia.    HD cath removed - hopefully will clear blood with a full line holiday now. Repeating blood cx x2 today.    Monitoring for adverse effects of abx such as rash/itching/diarrhea - none apparent.    Will follow peripherally over the weekend. Please call Dr. Mcfarlane with questions. Thanks!    Mat Jauregui MD  [facility]  Office #[phone]

## 2024-05-10 NOTE — PROGRESS NOTES
----- Message from Bhavna Agarwal MA sent at 11/12/2020  2:47 PM CST -----  Contact: Patient [phone]    ----- Message -----  From: Candis London  Sent: 11/12/2020  10:53 AM CST  To: [facility] Staff    Calling to get test results.  Name of test: Lab  Date of test: 10/21/2020  Where was the test performed: [place]  Comments:     Please call and advise.    Thank You         .Gio Hemphill is a 39 y.o. male on day 10 of admission presenting with SIRS (systemic inflammatory response syndrome) (Multi).      Subjective   Chart rev       Objective     Last Recorded Vitals  /64 (BP Location: Left arm, Patient Position: Lying)   Pulse 102   Temp 36.8 °C (98.2 °F) (Oral)   Resp 18   Wt 70.5 kg (155 lb 6.8 oz)   SpO2 96%   Intake/Output last 3 Shifts:  No intake or output data in the 24 hours ending 05/10/24 0809    Admission Weight  Weight: 65.8 kg (145 lb) (04/30/24 0825)    Daily Weight  05/08/24 : 70.5 kg (155 lb 6.8 oz)    Image Results  MR lumbar spine wo IV contrast  Narrative: Interpreted By:  Eva Og,  Heather Hernandez   STUDY:  MR LUMBAR SPINE WO IV CONTRAST      INDICATION:  Signs/Symptoms:back pain new with bacteremia, possible infection      COMPARISON:  CT of the lumbar spine 04/30/2024. Body CT 01/14/2024.      ACCESSION NUMBER(S):  [id number]      ORDERING CLINICIAN:  ALKA VIEYRA      TECHNIQUE:  Sagittal T1, sagittal T2, sagittal STIR, axial T1 and axial T2  weighted MRI images of the lumbar spine were obtained without  intravenous contrast administration.      FINDINGS:  Image quality is somewhat degraded due to motion and technique.      There are 5 non-rib-bearing lumbar-type vertebral bodies. The last  well-formed intervertebral disc is labeled L5-S1.      Minimal retrolisthesis at L5-S1. Alignment is otherwise normal.      Vertebral body heights are maintained. Disc spaces are relatively  maintained.      There is diffusely hypointense bone marrow signal on the T1 weighted  images which may be related to renal osteodystrophy. Differential  considerations include anemia, reactive marrow or bone marrow  infiltrating process.      There is no increased STIR signal to suggest marrow edema. Multilevel  degenerative endplate changes include prominent Schmorl's nodes most  pronounced at L3-4 where there are Modic type 2 changes and central  intradiscal  T1 and T2 hypointense signal likely degenerative.      The conus medullaris terminates at L1-2. The visualized spinal cord,  conus medullaris and cauda equina demonstrate normal signal and  morphology.      Fatty atrophic changes involve the paravertebral musculature.  Partially visualized kidneys demonstrate atrophic changes with  multiple bilateral cysts, better characterized on previous body CT  dated 01/14/2024. Partially visualized catheter within the inferior  vena cava and right common iliac vein.      T10-11 on sagittal images only: No spinal canal or neural foraminal  stenosis.      T11-12 on sagittal images only: No spinal canal or neural foraminal  stenosis.      T12-L1: Facet arthropathy and ligamentum flavum thickening. No spinal  canal or neural foraminal stenosis.      L1-2: Facet arthropathy and ligamentum flavum thickening. No spinal  canal or neural foraminal stenosis.      L2-3: Facet arthropathy and ligamentum flavum thickening. No spinal  canal or neural foraminal stenosis.      L3-4: Disc bulge, facet arthropathy and ligamentum flavum thickening.  No spinal canal or neural foraminal stenosis.      L4-5: Disc bulge with small central protrusion, facet arthropathy and  ligamentum flavum thickening. No spinal canal stenosis. Mild  bilateral neural foraminal stenosis.      L5-S1: Disc bulge, facet arthropathy and ligamentum flavum  thickening. No spinal canal stenosis. Mild right and no left neural  foraminal stenosis.      Degenerative changes are noted at the sacroiliac joints.      Impression: Diffusely hypointense bone marrow signal on the T1 weighted images  may be related to renal osteodystrophy. Differential considerations  include anemia, reactive bone marrow or bone marrow infiltrating  process.      No definite imaging findings to suggest discitis osteomyelitis on the  current exam.      Degenerative changes without significant spinal canal stenosis.  Varying degrees of mild neural  foraminal narrowing as detailed above.      I personally reviewed the images/study and I agree with the findings  as stated by Dr. Lowe.      MACRO:  None      Signed by: Eva Og 5/4/2024 11:11 AM  Dictation workstation:   [id number]      PHYSICAL EXAM  NEUROLOGICAL: No abnormal movements, no changes from before  HEENT: Head atraumatic, perrl,eomi, no oral lesions, no ear rash   NECK: Supple, no ln, jvp flat, thyroid palp  HEART: S1, S2, no added sound  LUNGS: dec a/e  EXTREMITIES: no edema  ABDOMEN: Soft, nontender, bowel sound positive, no organomegaly  SKIN: No change  Relevant Results               Assessment/Plan              Esrd  Anemia   T2dm  Pvd      Principal Problem:    SIRS (systemic inflammatory response syndrome) (Multi)    PLAN  Per ID  [id number]              Mauricio Estrada MD

## 2024-05-10 NOTE — PROGRESS NOTES
"Vancomycin Dosing by Pharmacy- FOLLOW UP    Edwar Hemphill is a 39 y.o. year old male who Pharmacy has been consulted for vancomycin dosing for other bacteremia . Based on the patient's indication and renal status this patient is being dosed based on a goal pre-HD level of 15-25.     Renal function is currently stable.    Current vancomycin dose: dosing by level    Most recent random level: 21 mcg/mL    Visit Vitals  /64 (BP Location: Left arm, Patient Position: Lying)   Pulse 102   Temp 36.8 °C (98.2 °F) (Oral)   Resp 18        Lab Results   Component Value Date    CREATININE 5.78 (H) 05/10/2024    CREATININE 6.29 (H) 05/08/2024    CREATININE 5.14 (H) 05/07/2024    CREATININE 6.35 (H) 05/06/2024        Patient weight is No results found for: \"PTWEIGHT\"    No results found for: \"CULTURE\"     No intake/output data recorded.  [unfilled]    Lab Results   Component Value Date    PATIENTTEMP 37.0 05/04/2024    PATIENTTEMP 37.0 01/14/2024    PATIENTTEMP 37.0 07/09/2023    PATIENTTEMP 37.0 07/08/2023    PATIENTTEMP 37.0 07/08/2023        Assessment/Plan    Above goal AUC. No vanco will be given today.  The next level will be obtained on 5/11 with am labs. May be obtained sooner if clinically indicated.   Will continue to monitor renal function daily while on vancomycin and order serum creatinine at least every 48 hours if not already ordered.  Follow for continued vancomycin needs, clinical response, and signs/symptoms of toxicity.       Noemí Hernandez [facility]           "

## 2024-05-10 NOTE — PROGRESS NOTES
Edwar Hemphill is a 39 y.o. male on day 10 of admission presenting with SIRS (systemic inflammatory response syndrome) (Multi).      Subjective   Patient seen and examined.  Sitting in his motorized chair.  Awake, alert and lucid.   No acute events. He does not offer specific complaints.        Objective          Vitals 24HR  Heart Rate:  [102-116]   Temp:  [35.4 °C (95.7 °F)-36.8 °C (98.2 °F)]   Resp:  [14-18]   BP: (102-138)/(64-80)   SpO2:  [95 %-100 %]     Intake/Output last 3 Shifts:  No intake or output data in the 24 hours ending 05/10/24 1129      Physical Exam  Constitutional: Well developed, awake/alert/oriented  x3   Eyes: Normal conjunctiva    ENMT: mucous membranes moist   Head/Neck: Neck is supple  Difficult to assess JVD   Respiratory/Thorax: Diminished bibasilar breath sounds,  no wheezing, rales or rhonchi   Cardiovascular: regular rhythm, normal  S1 and S2   Gastrointestinal: Nondistended, soft, non-tender,  no rebound tenderness or guarding   Genitourinary: No Castanon   Extremities: Rt leg edema   L AKA   RUE amputation  R femoral HD catheter   Neurological: No asterixis   Psychological: Appropriate mood and behavior   Skin: L AKA stump  Right femoral TDC     Scheduled Medications  [Held by provider] apixaban, 5 mg, oral, BID  atorvastatin, 40 mg, oral, Nightly  B complex-vitamin C-folic acid, 1 capsule, oral, Daily  epoetin tyson or biosimilar, 100 Units/kg, subcutaneous, Every Mon/Wed/Fri  heparin, 3,100 Units, intra-catheter, After Dialysis  heparin, 3,100 Units, intra-catheter, After Dialysis  insulin glargine, 4 Units, subcutaneous, Nightly  insulin lispro, 0-5 Units, subcutaneous, TID with meals  levothyroxine, 125 mcg, oral, Daily before breakfast  oxygen, , inhalation, Continuous - Inhalation  pantoprazole, 40 mg, intravenous, BID  polyethylene glycol, 17 g, oral, Daily  sennosides, 8.6 mg, oral, BID  sevelamer carbonate, 2,400 mg, oral, TID with meals      Continuous medications  oxygen, ,  Last Rate: 2 L/min (05/03/24 6748)        PRN medications: cyclobenzaprine, dextrose, dextrose, fentaNYL PF, glucagon, heparin, lidocaine PF, loperamide, midazolam, nitroglycerin, ondansetron, oxyCODONE, oxygen, simethicone, vancomycin     Relevant Results  Results from last 7 days   Lab Units 05/10/24  0527 05/07/24  0631 05/06/24  0818   WBC AUTO x10*3/uL 7.3 8.0 9.4   HEMOGLOBIN g/dL 7.7* 9.6* 9.6*   HEMATOCRIT % 23.8* 30.3* 28.6*   PLATELETS AUTO x10*3/uL 236 166 176     Results from last 7 days   Lab Units 05/10/24  0527 05/08/24  0632 05/07/24  0631   SODIUM mmol/L 132* 132* 133*   POTASSIUM mmol/L 4.8 4.6 4.5   CHLORIDE mmol/L 96* 96* 95*   CO2 mmol/L 27 23 25   BUN mg/dL 43* 64* 56*   CREATININE mg/dL 5.78* 6.29* 5.14*   GLUCOSE mg/dL 206* 51* 114*   CALCIUM mg/dL 9.0 8.9 9.0       MR lumbar spine wo IV contrast   Final Result   Diffusely hypointense bone marrow signal on the T1 weighted images   may be related to renal osteodystrophy. Differential considerations   include anemia, reactive bone marrow or bone marrow infiltrating   process.        No definite imaging findings to suggest discitis osteomyelitis on the   current exam.        Degenerative changes without significant spinal canal stenosis.   Varying degrees of mild neural foraminal narrowing as detailed above.        I personally reviewed the images/study and I agree with the findings   as stated by Dr. Lowe.        MACRO:   None        Signed by: Eva Og 5/4/2024 11:11 AM   Dictation workstation:   [id number]      IR CVC exchange   Final Result   Fluoroscopically guidewire exchange of right groin tunneled   hemodialysis catheter. The newly exchanged catheter is ready for   immediate use.             MACRO:   None        Signed by: Ronaldo Dempsey 5/3/2024 8:09 PM   Dictation workstation:   [id number]      Transthoracic Echo Complete   Final Result      XR chest 1 view   Final Result   Patchy right basilar infiltrate or atelectasis  with small right   pleural effusion.   Signed by Joseph Overton MD      CT thoracic spine wo IV contrast   Final Result   Thoracic spine:   1.  Portions of T1 and T2 cough on this study.   2.  No acute osseous findings seen from T3 through T12.   3.  Tiny bilateral effusions with adjacent lower lobe infiltrates.   4.  The distal adenopathy.   5.  Coronary artery calcifications.   Lumbar spine:   1.  No compression deformities or spinal listhesis is noted.   2.  Disc bulging from L3 to S1.  Evaluation for spinal stenosis is   limited due to lack mammographic contrast.   3.  Incidental note of a long dialysis catheter within the inferior   vena cava.   Signed by Deejay Hoffman MD      CT lumbar spine wo IV contrast   Final Result   Thoracic spine:   1.  Portions of T1 and T2 cough on this study.   2.  No acute osseous findings seen from T3 through T12.   3.  Tiny bilateral effusions with adjacent lower lobe infiltrates.   4.  The distal adenopathy.   5.  Coronary artery calcifications.   Lumbar spine:   1.  No compression deformities or spinal listhesis is noted.   2.  Disc bulging from L3 to S1.  Evaluation for spinal stenosis is   limited due to lack mammographic contrast.   3.  Incidental note of a long dialysis catheter within the inferior   vena cava.   Signed by Deejay Hoffman MD      Lower extremity venous duplex right   Final Result      XR pelvis 1-2 views   Final Result   Slightly limited secondary to underlying osteopenia.  No acute osseous   abnormality.   Signed by Roe Ruiz MD      IR CVC exchange    (Results Pending)   IR CVC removal    (Results Pending)            Assessment/Plan      Edwar Hemphill is a 38 y.o. male with a past medical history of end-stage renal disease on hemodialysis via right femoral TDC who resides at [facility]. 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  He later had stump debridement on 7/27 with wound VAC placement.  He had polymicrobial infection including resistant E coli, Acinetobacter baumannii and Enterobacter fecalis. He was admitted last September with positive blood cultures growing Acinetobacter baumannii and Stenotrophomonas. He went to IR for dialysis line exchange.  He then was admitted again in November where CT noted left sided fluid collection concerning for abscess.  He grew MRSA and Streptococcus, catheter was exchanged on November 13, he completed vancomycin. He then had Staph aureus CLABSI again in January of this.  POLI negative for endocarditis. HD cath removed 1/16 and replaced on 1/19 to the L groin. IV Vancomycin 750mg with HD through 2/14/24.      He comes in now with fever with low back pain radiating down the right leg. He was feeling too sick and missed HD.  He was noted to be febrile.  Blood cultures were obtained and came back growing MRSA.  Infectious disease is following and managing antimicrobial coverage.  He remains bacteremic with MRSA despite guidewire exchange of the groin tunneled HD catheter on 5/3. MRI of the lumbar spine did not show osteomyelitis/discitis and 2 D ECHO was negative for a vegetation. We dialyzed him on Wednesday. We removed his line all together for a line holiday on 5/9. There is a risk that we may not be able to re-establish access but we have limited options.  We are monitoring him closely for RRT needs with the dialysis catheter out and there is no acute need. Nephrology will follow closely with his care.        Principal Problem:    SIRS (systemic inflammatory response syndrome) (Multi)      I spent 35 minutes in the professional and overall care of this patient.      Eber Bruce,  DO

## 2024-05-11 LAB
ALBUMIN SERPL BCP-MCNC: 2.8 G/DL (ref 3.4–5)
ANION GAP SERPL CALC-SCNC: 17 MMOL/L (ref 10–20)
BUN SERPL-MCNC: 51 MG/DL (ref 6–23)
CALCIUM SERPL-MCNC: 9.6 MG/DL (ref 8.6–10.3)
CHLORIDE SERPL-SCNC: 94 MMOL/L (ref 98–107)
CO2 SERPL-SCNC: 26 MMOL/L (ref 21–32)
CREAT SERPL-MCNC: 6.99 MG/DL (ref 0.5–1.3)
EGFRCR SERPLBLD CKD-EPI 2021: 9 ML/MIN/1.73M*2
ERYTHROCYTE [DISTWIDTH] IN BLOOD BY AUTOMATED COUNT: 19 % (ref 11.5–14.5)
GLUCOSE BLD MANUAL STRIP-MCNC: 134 MG/DL (ref 74–99)
GLUCOSE BLD MANUAL STRIP-MCNC: 144 MG/DL (ref 74–99)
GLUCOSE BLD MANUAL STRIP-MCNC: 163 MG/DL (ref 74–99)
GLUCOSE BLD MANUAL STRIP-MCNC: 179 MG/DL (ref 74–99)
GLUCOSE SERPL-MCNC: 122 MG/DL (ref 74–99)
HCT VFR BLD AUTO: 27.1 % (ref 41–52)
HGB BLD-MCNC: 8.2 G/DL (ref 13.5–17.5)
MCH RBC QN AUTO: 28.4 PG (ref 26–34)
MCHC RBC AUTO-ENTMCNC: 30.3 G/DL (ref 32–36)
MCV RBC AUTO: 94 FL (ref 80–100)
NRBC BLD-RTO: 0 /100 WBCS (ref 0–0)
PHOSPHATE SERPL-MCNC: 5.7 MG/DL (ref 2.5–4.9)
PLATELET # BLD AUTO: 304 X10*3/UL (ref 150–450)
POTASSIUM SERPL-SCNC: 5 MMOL/L (ref 3.5–5.3)
RBC # BLD AUTO: 2.89 X10*6/UL (ref 4.5–5.9)
SODIUM SERPL-SCNC: 132 MMOL/L (ref 136–145)
VANCOMYCIN SERPL-MCNC: 19.7 UG/ML (ref 5–20)
WBC # BLD AUTO: 7 X10*3/UL (ref 4.4–11.3)

## 2024-05-11 PROCEDURE — 2500000006 HC RX 250 W HCPCS SELF ADMINISTERED DRUGS (ALT 637 FOR ALL PAYERS): Performed by: NURSE PRACTITIONER

## 2024-05-11 PROCEDURE — 2500000002 HC RX 250 W HCPCS SELF ADMINISTERED DRUGS (ALT 637 FOR MEDICARE OP, ALT 636 FOR OP/ED): Performed by: INTERNAL MEDICINE

## 2024-05-11 PROCEDURE — 85027 COMPLETE CBC AUTOMATED: CPT | Performed by: PHYSICIAN ASSISTANT

## 2024-05-11 PROCEDURE — 1100000001 HC PRIVATE ROOM DAILY

## 2024-05-11 PROCEDURE — 2500000001 HC RX 250 WO HCPCS SELF ADMINISTERED DRUGS (ALT 637 FOR MEDICARE OP): Performed by: NURSE PRACTITIONER

## 2024-05-11 PROCEDURE — 2500000004 HC RX 250 GENERAL PHARMACY W/ HCPCS (ALT 636 FOR OP/ED): Performed by: INTERNAL MEDICINE

## 2024-05-11 PROCEDURE — 36415 COLL VENOUS BLD VENIPUNCTURE: CPT | Performed by: INTERNAL MEDICINE

## 2024-05-11 PROCEDURE — 99232 SBSQ HOSP IP/OBS MODERATE 35: CPT | Performed by: INTERNAL MEDICINE

## 2024-05-11 PROCEDURE — 80202 ASSAY OF VANCOMYCIN: CPT | Performed by: INTERNAL MEDICINE

## 2024-05-11 PROCEDURE — 82947 ASSAY GLUCOSE BLOOD QUANT: CPT

## 2024-05-11 PROCEDURE — 2500000004 HC RX 250 GENERAL PHARMACY W/ HCPCS (ALT 636 FOR OP/ED)

## 2024-05-11 PROCEDURE — 2500000004 HC RX 250 GENERAL PHARMACY W/ HCPCS (ALT 636 FOR OP/ED): Performed by: NURSE PRACTITIONER

## 2024-05-11 PROCEDURE — C9113 INJ PANTOPRAZOLE SODIUM, VIA: HCPCS

## 2024-05-11 PROCEDURE — 2500000002 HC RX 250 W HCPCS SELF ADMINISTERED DRUGS (ALT 637 FOR MEDICARE OP, ALT 636 FOR OP/ED): Performed by: SURGERY

## 2024-05-11 PROCEDURE — 80069 RENAL FUNCTION PANEL: CPT | Performed by: INTERNAL MEDICINE

## 2024-05-11 RX ORDER — VANCOMYCIN HYDROCHLORIDE 500 MG/100ML
500 INJECTION, SOLUTION INTRAVENOUS ONCE
Qty: 100 ML | Refills: 0 | Status: COMPLETED | OUTPATIENT
Start: 2024-05-11 | End: 2024-05-11

## 2024-05-11 RX ADMIN — OXYCODONE HYDROCHLORIDE 5 MG: 5 TABLET ORAL at 21:10

## 2024-05-11 RX ADMIN — PANTOPRAZOLE SODIUM 40 MG: 40 INJECTION, POWDER, FOR SOLUTION INTRAVENOUS at 09:15

## 2024-05-11 RX ADMIN — SEVELAMER CARBONATE 2400 MG: 800 TABLET, FILM COATED ORAL at 12:38

## 2024-05-11 RX ADMIN — LEVOTHYROXINE SODIUM 125 MCG: 125 TABLET ORAL at 06:33

## 2024-05-11 RX ADMIN — INSULIN LISPRO 1 UNITS: 100 INJECTION, SOLUTION INTRAVENOUS; SUBCUTANEOUS at 17:12

## 2024-05-11 RX ADMIN — OXYCODONE HYDROCHLORIDE 5 MG: 5 TABLET ORAL at 06:33

## 2024-05-11 RX ADMIN — ATORVASTATIN CALCIUM 40 MG: 40 TABLET, FILM COATED ORAL at 21:10

## 2024-05-11 RX ADMIN — SEVELAMER CARBONATE 2400 MG: 800 TABLET, FILM COATED ORAL at 17:08

## 2024-05-11 RX ADMIN — VANCOMYCIN HYDROCHLORIDE 500 MG: 500 INJECTION, SOLUTION INTRAVENOUS at 09:15

## 2024-05-11 RX ADMIN — NEPHROCAP 1 CAPSULE: 1 CAP ORAL at 09:15

## 2024-05-11 RX ADMIN — OXYCODONE HYDROCHLORIDE 5 MG: 5 TABLET ORAL at 10:36

## 2024-05-11 RX ADMIN — INSULIN GLARGINE 4 UNITS: 100 INJECTION, SOLUTION SUBCUTANEOUS at 21:19

## 2024-05-11 RX ADMIN — PANTOPRAZOLE SODIUM 40 MG: 40 INJECTION, POWDER, FOR SOLUTION INTRAVENOUS at 21:10

## 2024-05-11 ASSESSMENT — COGNITIVE AND FUNCTIONAL STATUS - GENERAL
DRESSING REGULAR UPPER BODY CLOTHING: A LOT
WALKING IN HOSPITAL ROOM: TOTAL
MOVING FROM LYING ON BACK TO SITTING ON SIDE OF FLAT BED WITH BEDRAILS: A LITTLE
CLIMB 3 TO 5 STEPS WITH RAILING: TOTAL
PERSONAL GROOMING: A LOT
DRESSING REGULAR LOWER BODY CLOTHING: TOTAL
MOBILITY SCORE: 11
STANDING UP FROM CHAIR USING ARMS: TOTAL
TURNING FROM BACK TO SIDE WHILE IN FLAT BAD: A LITTLE
MOVING TO AND FROM BED TO CHAIR: A LOT
DAILY ACTIVITIY SCORE: 11
HELP NEEDED FOR BATHING: TOTAL
TOILETING: TOTAL

## 2024-05-11 ASSESSMENT — PAIN - FUNCTIONAL ASSESSMENT
PAIN_FUNCTIONAL_ASSESSMENT: 0-10

## 2024-05-11 ASSESSMENT — PAIN DESCRIPTION - LOCATION: LOCATION: BACK

## 2024-05-11 ASSESSMENT — PAIN SCALES - WONG BAKER: WONGBAKER_NUMERICALRESPONSE: HURTS WHOLE LOT

## 2024-05-11 ASSESSMENT — ENCOUNTER SYMPTOMS: BACK PAIN: 1

## 2024-05-11 ASSESSMENT — PAIN SCALES - GENERAL
PAINLEVEL_OUTOF10: 8
PAINLEVEL_OUTOF10: 3
PAINLEVEL_OUTOF10: 3
PAINLEVEL_OUTOF10: 6
PAINLEVEL_OUTOF10: 6

## 2024-05-11 NOTE — PROGRESS NOTES
"Edwar Hemphill is a 39 y.o. male on day 11 of admission presenting with SIRS (systemic inflammatory response syndrome) (Multi).    Subjective   Patient admits to having back pain which is 10/10.  He did not have breakfast as he did not desire it.     I have reviewed histories, allergies and medications have been reviewed and there are no changes       Objective   Review of Systems   Musculoskeletal:  Positive for back pain.   All other systems reviewed and are negative.    Physical Exam  Vitals and nursing note reviewed.   Constitutional:       General: He is not in acute distress.     Appearance: Normal appearance. He is normal weight.   HENT:      Head: Normocephalic and atraumatic.      Nose: Nose normal.      Mouth/Throat:      Mouth: Mucous membranes are moist.   Eyes:      Extraocular Movements: Extraocular movements intact.   Pulmonary:      Effort: Pulmonary effort is normal.   Musculoskeletal:         General: Normal range of motion.   Neurological:      Mental Status: He is alert and oriented to person, place, and time.   Psychiatric:         Mood and Affect: Mood normal.         Last Recorded Vitals  Blood pressure 105/72, pulse (!) 111, temperature 36.8 °C (98.2 °F), temperature source Oral, resp. rate 18, height 1.753 m (5' 9\"), weight 70.5 kg (155 lb 6.8 oz), SpO2 91%.  Intake/Output last 3 Shifts:  No intake/output data recorded.    Relevant Results  Results from last 7 days   Lab Units 05/11/24  0805 05/11/24  0631 05/10/24  2236 05/10/24  1813 05/10/24  1149 05/10/24  0744 05/10/24  0527 05/08/24  1137 05/08/24  0632 05/07/24  0753 05/07/24  0631 05/06/24  1134 05/06/24  0818   POCT GLUCOSE mg/dL 134*  --  108* 119* 221* 197*  --    < >  --    < >  --    < >  --    GLUCOSE mg/dL  --  122*  --   --   --   --  206*  --  51*  --  114*  --  142*    < > = values in this interval not displayed.     Scheduled medications  [Held by provider] apixaban, 5 mg, oral, BID  atorvastatin, 40 mg, oral, Nightly  B " complex-vitamin C-folic acid, 1 capsule, oral, Daily  epoetin tyson or biosimilar, 100 Units/kg, subcutaneous, Every Mon/Wed/Fri  heparin, 3,100 Units, intra-catheter, After Dialysis  heparin, 3,100 Units, intra-catheter, After Dialysis  insulin glargine, 4 Units, subcutaneous, Nightly  insulin lispro, 0-5 Units, subcutaneous, TID with meals  levothyroxine, 125 mcg, oral, Daily before breakfast  pantoprazole, 40 mg, intravenous, BID  polyethylene glycol, 17 g, oral, Daily  sennosides, 8.6 mg, oral, BID  sevelamer carbonate, 2,400 mg, oral, TID with meals  vancomycin, 500 mg, intravenous, Once      Continuous medications  oxygen, , Last Rate: 2 L/min (05/03/24 1758)      PRN medications  PRN medications: cyclobenzaprine, dextrose, dextrose, fentaNYL PF, glucagon, heparin, lidocaine PF, loperamide, midazolam, nitroglycerin, ondansetron, oxyCODONE, oxygen, simethicone, vancomycin    Results for orders placed or performed during the hospital encounter of 04/30/24 (from the past 24 hour(s))   Blood Culture    Specimen: Peripheral Venipuncture; Blood culture   Result Value Ref Range    Blood Culture Loaded on Instrument - Culture in progress    Green Top   Result Value Ref Range    Extra Tube Hold for add-ons.    Lavender Top   Result Value Ref Range    Extra Tube Hold for add-ons.    SST TOP   Result Value Ref Range    Extra Tube Hold for add-ons.    Blood Culture    Specimen: Peripheral Venipuncture; Blood culture   Result Value Ref Range    Blood Culture Loaded on Instrument - Culture in progress    POCT GLUCOSE   Result Value Ref Range    POCT Glucose 221 (H) 74 - 99 mg/dL   POCT GLUCOSE   Result Value Ref Range    POCT Glucose 119 (H) 74 - 99 mg/dL   POCT GLUCOSE   Result Value Ref Range    POCT Glucose 108 (H) 74 - 99 mg/dL   CBC   Result Value Ref Range    WBC 7.0 4.4 - 11.3 x10*3/uL    nRBC 0.0 0.0 - 0.0 /100 WBCs    RBC 2.89 (L) 4.50 - 5.90 x10*6/uL    Hemoglobin 8.2 (L) 13.5 - 17.5 g/dL    Hematocrit 27.1 (L)  41.0 - 52.0 %    MCV 94 80 - 100 fL    MCH 28.4 26.0 - 34.0 pg    MCHC 30.3 (L) 32.0 - 36.0 g/dL    RDW 19.0 (H) 11.5 - 14.5 %    Platelets 304 150 - 450 x10*3/uL   Renal Function Panel   Result Value Ref Range    Glucose 122 (H) 74 - 99 mg/dL    Sodium 132 (L) 136 - 145 mmol/L    Potassium 5.0 3.5 - 5.3 mmol/L    Chloride 94 (L) 98 - 107 mmol/L    Bicarbonate 26 21 - 32 mmol/L    Anion Gap 17 10 - 20 mmol/L    Urea Nitrogen 51 (H) 6 - 23 mg/dL    Creatinine 6.99 (H) 0.50 - 1.30 mg/dL    eGFR 9 (L) >60 mL/min/1.73m*2    Calcium 9.6 8.6 - 10.3 mg/dL    Phosphorus 5.7 (H) 2.5 - 4.9 mg/dL    Albumin 2.8 (L) 3.4 - 5.0 g/dL   Vancomycin   Result Value Ref Range    Vancomycin 19.7 5.0 - 20.0 ug/mL   POCT GLUCOSE   Result Value Ref Range    POCT Glucose 134 (H) 74 - 99 mg/dL      MR lumbar spine wo IV contrast    Result Date: 5/4/2024  Interpreted By:  Eva Og and Muddasani Dheeraj STUDY: MR LUMBAR SPINE WO IV CONTRAST   INDICATION: Signs/Symptoms:back pain new with bacteremia, possible infection   COMPARISON: CT of the lumbar spine 04/30/2024. Body CT 01/14/2024.   ACCESSION NUMBER(S): [id number]   ORDERING CLINICIAN: ALKA VIEYRA   TECHNIQUE: Sagittal T1, sagittal T2, sagittal STIR, axial T1 and axial T2 weighted MRI images of the lumbar spine were obtained without intravenous contrast administration.   FINDINGS: Image quality is somewhat degraded due to motion and technique.   There are 5 non-rib-bearing lumbar-type vertebral bodies. The last well-formed intervertebral disc is labeled L5-S1.   Minimal retrolisthesis at L5-S1. Alignment is otherwise normal.   Vertebral body heights are maintained. Disc spaces are relatively maintained.   There is diffusely hypointense bone marrow signal on the T1 weighted images which may be related to renal osteodystrophy. Differential considerations include anemia, reactive marrow or bone marrow infiltrating process.   There is no increased STIR signal to suggest marrow  edema. Multilevel degenerative endplate changes include prominent Schmorl's nodes most pronounced at L3-4 where there are Modic type 2 changes and central intradiscal T1 and T2 hypointense signal likely degenerative.   The conus medullaris terminates at L1-2. The visualized spinal cord, conus medullaris and cauda equina demonstrate normal signal and morphology.   Fatty atrophic changes involve the paravertebral musculature. Partially visualized kidneys demonstrate atrophic changes with multiple bilateral cysts, better characterized on previous body CT dated 01/14/2024. Partially visualized catheter within the inferior vena cava and right common iliac vein.   T10-11 on sagittal images only: No spinal canal or neural foraminal stenosis.   T11-12 on sagittal images only: No spinal canal or neural foraminal stenosis.   T12-L1: Facet arthropathy and ligamentum flavum thickening. No spinal canal or neural foraminal stenosis.   L1-2: Facet arthropathy and ligamentum flavum thickening. No spinal canal or neural foraminal stenosis.   L2-3: Facet arthropathy and ligamentum flavum thickening. No spinal canal or neural foraminal stenosis.   L3-4: Disc bulge, facet arthropathy and ligamentum flavum thickening. No spinal canal or neural foraminal stenosis.   L4-5: Disc bulge with small central protrusion, facet arthropathy and ligamentum flavum thickening. No spinal canal stenosis. Mild bilateral neural foraminal stenosis.   L5-S1: Disc bulge, facet arthropathy and ligamentum flavum thickening. No spinal canal stenosis. Mild right and no left neural foraminal stenosis.   Degenerative changes are noted at the sacroiliac joints.       Diffusely hypointense bone marrow signal on the T1 weighted images may be related to renal osteodystrophy. Differential considerations include anemia, reactive bone marrow or bone marrow infiltrating process.   No definite imaging findings to suggest discitis osteomyelitis on the current exam.    Degenerative changes without significant spinal canal stenosis. Varying degrees of mild neural foraminal narrowing as detailed above.   I personally reviewed the images/study and I agree with the findings as stated by Dr. Lowe.   MACRO: None   Signed by: Eva Jarrodanand 5/4/2024 11:11 AM Dictation workstation:   [id number]    IR CVC exchange    Result Date: 5/3/2024  Interpreted By:  Ronaldo Dempsey, STUDY: IR CVC EXCHANGE; ;  5/3/2024 6:28 pm   FLUOROSCOPICALLY GUIDED EXCHANGE OF RIGHT GROIN TUNNELED HEMODIALYSIS CATHETER   INDICATION: Signs/Symptoms:Bacteremia- Right fem TDC exchange. 39-year-old man with end-stage renal disease, central thoracic venous occlusion, end-stage vascular access with bacteremia. Guidewire exchange of right groin tunneled hemodialysis catheter in an attempt to salvage the access.   COMPARISON: Chest radiograph 04/30/2024, fluoroscopic images from prior tunneled hemodialysis catheter placement 01/22/2024   ACCESSION NUMBER(S): [id number]   ORDERING CLINICIAN: SACHI MORTENSEN   TECHNIQUE:   ATTENDING : Ronaldo Dempsey M.D.   TECHNICAL DESCRIPTION/FINDINGS: The procedure, including all risks, benefits and alternatives were explained to the patient in detail. All questions were answered and written informed consent was obtained.   The patient was positioned supine on the angiography table. A time-out was performed.   The right groin was prepped and draped in usual sterile fashion. A  fluoroscopic image was obtained demonstrating the tunneled hemodialysis catheter terminating at the inferior cavoatrial junction.   1% lidocaine was administered via the subcutaneous tunnel tract for local anesthesia. Blunt dissection was performed to free the subcutaneous catheter cuff. An 035 stiff Glidewire was then advanced through the catheter into the right atrium. The old catheter was removed. Over the guidewire, a new 14.5 Tristanian by 42 cm tip to cuff dual-lumen hemodialysis  catheter was then placed. A completion fluoroscopic image demonstrates the catheter in the inferior right atrium.   Catheter lumens easily aspirated and flushed and were locked with a concentrated heparinized solution (1000 units/cc). The catheter hub was then sutured to the skin with 2 0 Prolene stay suture. Because of losing along the tract, Gel-Foam pledgets were then administered subcutaneously near where the catheter enters the skin for additional hemostasis, followed by a pursestring suture. A sterile dressing was applied.   SEDATION/MEDICATIONS: Continuous cardiopulmonary monitoring was performed by a radiology nurse for the duration of the procedure.  1 mg Versed and   50 mcg Fentanyl were administered for moderate conscious sedation time of 20 minutes.      10 cc 1% Lidocaine was administered subcutaneously for local anesthesia. SPECIMENS: None. ESTIMATED BLOOD LOSS:  5 cc FLOUROSCOPY:  1.2 minutes; DAP  64596 mGy-cm*2; Air Kerma  59.07 mGy CONTRAST: None.   FINDINGS: Test       Fluoroscopically guidewire exchange of right groin tunneled hemodialysis catheter. The newly exchanged catheter is ready for immediate use.     MACRO: None   Signed by: Ronaldo Dempsey 5/3/2024 8:09 PM Dictation workstation:   [id number]    Transthoracic Echo Complete    Result Date: 5/3/2024   [facility], [address] [place] [address]              Tel [phone] and Fax [phone] TRANSTHORACIC ECHOCARDIOGRAM REPORT  Patient Name:      XIOMARA Maya Physician:    [id number] Brandon Nur DO Study Date:        5/3/2024             Ordering Provider:    [id number] ALKA VIEYRA MRN/PID:           [id number]             Fellow: Accession#:        [id number]         Nurse: Date of Birth/Age: 1985 / 39 years Sonographer:          Sy Lyons NAKITA Gender:            M                    Additional Staff: Height:            175.00 cm             Admit Date: Weight:            65.80 kg             Admission Status:     Inpatient -                                                               Routine BSA / BMI:         1.80 m2 / 21.49      Encounter#:           [id number]                    kg/m2                                         Department Location:  [facility]                                                               Invasive Blood Pressure: 112 /74 mmHg Study Type:    TRANSTHORACIC ECHO (TTE) COMPLETE Diagnosis/ICD: Endocarditis, valve unspecified-I38 Indication:    endocarditis CPT Code:      Echo Complete w Full Doppler-02822 Patient History: Pertinent History: HTN and Hyperlipidemia. Study Detail: The following Echo studies were performed: M-Mode, Doppler, color               flow and 2D.  PHYSICIAN INTERPRETATION: Left Ventricle: The left ventricular systolic function is moderately decreased, with an estimated ejection fraction of 35-40%. There are no regional wall motion abnormalities. The left ventricular cavity size is normal. Abnormal (paradoxical) septal motion, consistent with left bundle branch block. Spectral Doppler shows an impaired relaxation pattern of left ventricular diastolic filling. Left Atrium: The left atrium is normal in size. Right Ventricle: The right ventricle is normal in size. There is normal right ventricular global systolic function. Right Atrium: The right atrium is normal in size. Aortic Valve: The aortic valve is probably trileaflet. There is no evidence of aortic valve regurgitation. Mitral Valve: The mitral valve is mildly thickened. There is trace mitral valve regurgitation. Tricuspid Valve: The tricuspid valve is structurally normal. No evidence of tricuspid regurgitation. Pulmonic Valve: The pulmonic valve is structurally normal. There is no indication of pulmonic valve regurgitation. Pericardium: There is no pericardial effusion noted. Aorta: The aortic root is normal. In comparison to the  previous echocardiogram(s): Compared with study from 1/16/2024, LV function has declined. Was previously normal.  CONCLUSIONS:  1. Left ventricular systolic function is moderately decreased with a 35-40% estimated ejection fraction.  2. Abnormal septal motion consistent with left bundle branch block.  3. Spectral Doppler shows an impaired relaxation pattern of left ventricular diastolic filling.  4. No vegetations visualized within the limitations of this study.  5. Compared with study from 1/16/2024, LV function has declined. Was previously normal. QUANTITATIVE DATA SUMMARY: LA VOLUME:                               Normal Ranges: LA Vol A4C:        40.1 ml    (22+/-6mL/m2) LA Vol A2C:        35.3 ml LA Vol BP:         40.9 ml LA Vol Index A4C:  22.3ml/m2 LA Vol Index A2C:  19.6 ml/m2 LA Vol Index BP:   22.7 ml/m2 LA Area A4C:       15.3 cm2 LA Area A2C:       13.2 cm2 LA Major Axis A4C: 5.0 cm LA Major Axis A2C: 4.2 cm LA Volume Index:   22.7 ml/m2  [id number] Brandon Nur DO Electronically signed on 5/3/2024 at 4:24:04 PM  ** Final **     Lower extremity venous duplex right    Result Date: 5/1/2024             [place] [address]   Tel [phone] and Fax [phone]  Vascular Lab Report [facility] US LOWER EXTREMITY VENOUS DUPLEX RIGHT  Patient Name:      XIOMARA Maya Physician:  [id number] Rayo Jauregui MD, RPVI Study Date:        4/30/2024            Ordering Physician: [id number] FARMER MRN/PID:           [id number]             Technologist:       Megan Shay RVT Accession#:        [id number]         Technologist 2: Date of Birth/Age: 1985 / 39 years Encounter#:         [id number] Gender:            M Admission Status:  Emergency            Location Performed: [facility]  Diagnosis/ICD: Pain in right leg-M79.604 CPT Codes:     99318 Peripheral venous duplex scan for DVT Limited  **CRITICAL  RESULT** Critical Result: Acute DVT in the right leg. Notification called to Deep Paniagua PA-C on 4/30/2024 at 10:52:21 AM by Megan Shay RDMS, RVT.  CONCLUSIONS: Right Lower Venous: There is acute non-occlusive deep vein thrombosis visualized in the common femoral vein. There are chronic changes visualized in the popliteal vein. Enlarged lymph node noted in the right groin. Waveforms suggestive of more distal obstruction/thrombus. May wish further means of imaging evaluation. Left Lower Venous: There are chronic changes visualized in the common femoral vein.  Comparison: Compared with study from 7/27/2023, New finding of acute non-occlusive thrombus in the right CFV.  Imaging & Doppler Findings:  Right                 Compressible      Thrombus          Flow Distal External Iliac                     None CFV                     Partial    Acute non-occlusive Continuous PFV                       Yes             None FV Proximal               Yes             None         Continuous FV Mid                    Yes             None FV Distal                 Yes             None Popliteal                 Yes            Chronic       Continuous Peroneal                  Yes             None PTV                       Yes             None  Left Compress Thrombus        Flow CFV    Yes    Chronic  Spontaneous/Phasic  [id number] Rayo Jauregui MD, RPVI Electronically signed by [id number] Rayo Jauregui MD, RPBRIAN on 5/1/2024 at 9:13:07 AM  ** Final **     XR chest 1 view    Result Date: 4/30/2024  STUDY: Chest Radiograph;  4/30/2024 at 2:42 PM. INDICATION: Fever. COMPARISON: XR chest 1/14/2024, 11/10/2023; CTA chest 2/15/2023. ACCESSION NUMBER(S): [id number] ORDERING CLINICIAN: WENDY INFANTE TECHNIQUE:  Frontal chest was obtained at 14:42 hours. FINDINGS: CARDIOMEDIASTINAL SILHOUETTE: Cardiomediastinal silhouette is normal in size and configuration.  LUNGS: Patchy right basilar infiltrate or atelectasis with small right pleural effusion.  Left basilar scar versus atelectasis. ABDOMEN: No remarkable upper abdominal findings.  BONES: No acute osseous changes.    Patchy right basilar infiltrate or atelectasis with small right pleural effusion. Signed by Joseph Overton MD    CT thoracic spine wo IV contrast    Result Date: 4/30/2024  STUDY: CT Thoracic Spine and Lumbar Spine without IV Contrast; 4/30/2024 11:11 AM INDICATION: Midline back pain. COMPARISON: None Available. ACCESSION NUMBER(S): [id number], [id number] ORDERING CLINICIAN: RAJNI FARMER TECHNIQUE:  CT of the thoracic spine and lumbar spine was performed without intravenous or intrathecal contrast.  Sagittal and coronal reconstructions were generated.  Automated mA/kV exposure control was utilized and patient examination was performed in strict accordance with principles of ALARA. FINDINGS: Portions of the T1 vertebral body, as well as a small portion of the anterior superior aspect of T2 is cut off on this study.  No compression deformities are visualized within the thoracic vertebral bodies from T2 through T12.  No spondylolisthesis is noted.  No pedicular defects are noted.  No prominent edema is appreciated within the adjacent posterior paravertebral musculature.  Evaluation of the thecal sac is limited due to lack of myelographic contrast.  There are enlarged lymph nodes seen within the visualized portions of the mediastinum.  There are bilateral lower lobe infiltrates with tiny bilateral effusions.  Coronary artery calcifications are present. No compression deformities are visualized within the lumbar spine.  No pars defects are noted.  No significant spondylolisthesis is seen.  No prominent edema is appreciated within the visualized portions of the psoas musculature.  A long dialysis catheter is visualized within the inferior vena cava.  Evaluation of the thecal sac is limited due to lack of myelographic contrast.  There is disc bulging seen from L3 through S1.  No significant  impingement is appreciated upon the neural foramina on the sagittal reconstructions.    Thoracic spine: 1.  Portions of T1 and T2 cough on this study. 2.  No acute osseous findings seen from T3 through T12. 3.  Tiny bilateral effusions with adjacent lower lobe infiltrates. 4.  The distal adenopathy. 5.  Coronary artery calcifications. Lumbar spine: 1.  No compression deformities or spinal listhesis is noted. 2.  Disc bulging from L3 to S1.  Evaluation for spinal stenosis is limited due to lack mammographic contrast. 3.  Incidental note of a long dialysis catheter within the inferior vena cava. Signed by Deejay Hoffman MD    CT lumbar spine wo IV contrast    Result Date: 4/30/2024  STUDY: CT Thoracic Spine and Lumbar Spine without IV Contrast; 4/30/2024 11:11 AM INDICATION: Midline back pain. COMPARISON: None Available. ACCESSION NUMBER(S): [id number], [id number] ORDERING CLINICIAN: RAJNI FARMER TECHNIQUE:  CT of the thoracic spine and lumbar spine was performed without intravenous or intrathecal contrast.  Sagittal and coronal reconstructions were generated.  Automated mA/kV exposure control was utilized and patient examination was performed in strict accordance with principles of ALARA. FINDINGS: Portions of the T1 vertebral body, as well as a small portion of the anterior superior aspect of T2 is cut off on this study.  No compression deformities are visualized within the thoracic vertebral bodies from T2 through T12.  No spondylolisthesis is noted.  No pedicular defects are noted.  No prominent edema is appreciated within the adjacent posterior paravertebral musculature.  Evaluation of the thecal sac is limited due to lack of myelographic contrast.  There are enlarged lymph nodes seen within the visualized portions of the mediastinum.  There are bilateral lower lobe infiltrates with tiny bilateral effusions.  Coronary artery calcifications are present. No compression deformities are visualized within the  lumbar spine.  No pars defects are noted.  No significant spondylolisthesis is seen.  No prominent edema is appreciated within the visualized portions of the psoas musculature.  A long dialysis catheter is visualized within the inferior vena cava.  Evaluation of the thecal sac is limited due to lack of myelographic contrast.  There is disc bulging seen from L3 through S1.  No significant impingement is appreciated upon the neural foramina on the sagittal reconstructions.    Thoracic spine: 1.  Portions of T1 and T2 cough on this study. 2.  No acute osseous findings seen from T3 through T12. 3.  Tiny bilateral effusions with adjacent lower lobe infiltrates. 4.  The distal adenopathy. 5.  Coronary artery calcifications. Lumbar spine: 1.  No compression deformities or spinal listhesis is noted. 2.  Disc bulging from L3 to S1.  Evaluation for spinal stenosis is limited due to lack mammographic contrast. 3.  Incidental note of a long dialysis catheter within the inferior vena cava. Signed by Deejay Hoffman MD    XR pelvis 1-2 views    Result Date: 4/30/2024  STUDY: Pelvis Radiographs; 4/30/2024 at 9:06 AM. INDICATION: Pain. COMPARISON: CT pelvis 7/17/2023. ACCESSION NUMBER(S): [id number] ORDERING CLINICIAN: RAJNI FARMER TECHNIQUE:  One view(s) of the pelvis. FINDINGS:  The pelvic ring is intact.  There is no acute fracture.  Severe underlying osteopenia.    Slightly limited secondary to underlying osteopenia.  No acute osseous abnormality. Signed by Roe Ruiz MD            Assessment/Plan   Principal Problem:    SIRS (systemic inflammatory response syndrome) (Multi)    IMPRESSION  DIABETIC KETOACIDOSIS, RESOLVED  TYPE 2 DIABETES MELLITUS  LONG TERM CURRENT INSULIN USE  Glucose improved, no further hypoglycemia  History of ketoacidosis without basal insulin       RECOMMENDATIONS  To continue Lantus 4 units subcutaneous bedtime  To continue insulin correction scale TID AC   Diabetic diet as tolerated   [facility]  [facility]    Hypoglycemic protocol   Will continue to follow     Gibson Fortune MD

## 2024-05-11 NOTE — PROGRESS NOTES
Edwar Hemphill is a 39 y.o. male on day 11 of admission presenting with SIRS (systemic inflammatory response syndrome) (Multi).      Subjective   Patient seen and examined.  Sitting in his motorized chair. C/o ongoing back discomfort.   Awake, alert and lucid.   No acute events. He does not offer specific complaints.        Objective          Vitals 24HR  Heart Rate:  [104-111]   Temp:  [36.7 °C (98.1 °F)-37 °C (98.6 °F)]   Resp:  [16-18]   BP: (105-135)/(71-90)   SpO2:  [91 %-98 %]     Intake/Output last 3 Shifts:    Intake/Output Summary (Last 24 hours) at 5/11/2024 1255  Last data filed at 5/11/2024 1044  Gross per 24 hour   Intake 120 ml   Output --   Net 120 ml         Physical Exam  Constitutional: Well developed, awake/alert/oriented  x3   Eyes: Normal conjunctiva    ENMT: mucous membranes moist   Head/Neck: Neck is supple  Difficult to assess JVD   Respiratory/Thorax: Diminished bibasilar breath sounds,  no wheezing, rales or rhonchi   Cardiovascular: regular rhythm, normal  S1 and S2   Gastrointestinal: Nondistended, soft, non-tender,  no rebound tenderness or guarding   Genitourinary: No Castanon   Extremities: Rt leg edema   L AKA   RUE amputation  R femoral HD catheter   Neurological: No asterixis   Psychological: Appropriate mood and behavior   Skin: L AKA stump  Right femoral TDC     Scheduled Medications  [Held by provider] apixaban, 5 mg, oral, BID  atorvastatin, 40 mg, oral, Nightly  B complex-vitamin C-folic acid, 1 capsule, oral, Daily  epoetin tyson or biosimilar, 100 Units/kg, subcutaneous, Every Mon/Wed/Fri  heparin, 3,100 Units, intra-catheter, After Dialysis  heparin, 3,100 Units, intra-catheter, After Dialysis  insulin glargine, 4 Units, subcutaneous, Nightly  insulin lispro, 0-5 Units, subcutaneous, TID with meals  levothyroxine, 125 mcg, oral, Daily before breakfast  pantoprazole, 40 mg, intravenous, BID  polyethylene glycol, 17 g, oral, Daily  sennosides, 8.6 mg, oral, BID  sevelamer carbonate,  2,400 mg, oral, TID with meals      Continuous medications  oxygen, , Last Rate: 2 L/min (05/03/24 1758)        PRN medications: cyclobenzaprine, dextrose, dextrose, fentaNYL PF, glucagon, heparin, lidocaine PF, loperamide, midazolam, nitroglycerin, ondansetron, oxyCODONE, oxygen, simethicone, vancomycin     Relevant Results  Results from last 7 days   Lab Units 05/11/24  0631 05/10/24  0527 05/07/24  0631   WBC AUTO x10*3/uL 7.0 7.3 8.0   HEMOGLOBIN g/dL 8.2* 7.7* 9.6*   HEMATOCRIT % 27.1* 23.8* 30.3*   PLATELETS AUTO x10*3/uL 304 236 166     Results from last 7 days   Lab Units 05/11/24  0631 05/10/24  0527 05/08/24  0632   SODIUM mmol/L 132* 132* 132*   POTASSIUM mmol/L 5.0 4.8 4.6   CHLORIDE mmol/L 94* 96* 96*   CO2 mmol/L 26 27 23   BUN mg/dL 51* 43* 64*   CREATININE mg/dL 6.99* 5.78* 6.29*   GLUCOSE mg/dL 122* 206* 51*   CALCIUM mg/dL 9.6 9.0 8.9       MR lumbar spine wo IV contrast   Final Result   Diffusely hypointense bone marrow signal on the T1 weighted images   may be related to renal osteodystrophy. Differential considerations   include anemia, reactive bone marrow or bone marrow infiltrating   process.        No definite imaging findings to suggest discitis osteomyelitis on the   current exam.        Degenerative changes without significant spinal canal stenosis.   Varying degrees of mild neural foraminal narrowing as detailed above.        I personally reviewed the images/study and I agree with the findings   as stated by Dr. Lowe.        MACRO:   None        Signed by: Eva Og 5/4/2024 11:11 AM   Dictation workstation:   [id number]      IR CVC exchange   Final Result   Fluoroscopically guidewire exchange of right groin tunneled   hemodialysis catheter. The newly exchanged catheter is ready for   immediate use.             MACRO:   None        Signed by: Ronaldo Dempsey 5/3/2024 8:09 PM   Dictation workstation:   [id number]      Transthoracic Echo Complete   Final Result      XR chest 1  view   Final Result   Patchy right basilar infiltrate or atelectasis with small right   pleural effusion.   Signed by Joseph Overton MD      CT thoracic spine wo IV contrast   Final Result   Thoracic spine:   1.  Portions of T1 and T2 cough on this study.   2.  No acute osseous findings seen from T3 through T12.   3.  Tiny bilateral effusions with adjacent lower lobe infiltrates.   4.  The distal adenopathy.   5.  Coronary artery calcifications.   Lumbar spine:   1.  No compression deformities or spinal listhesis is noted.   2.  Disc bulging from L3 to S1.  Evaluation for spinal stenosis is   limited due to lack mammographic contrast.   3.  Incidental note of a long dialysis catheter within the inferior   vena cava.   Signed by Deejay Hoffman MD      CT lumbar spine wo IV contrast   Final Result   Thoracic spine:   1.  Portions of T1 and T2 cough on this study.   2.  No acute osseous findings seen from T3 through T12.   3.  Tiny bilateral effusions with adjacent lower lobe infiltrates.   4.  The distal adenopathy.   5.  Coronary artery calcifications.   Lumbar spine:   1.  No compression deformities or spinal listhesis is noted.   2.  Disc bulging from L3 to S1.  Evaluation for spinal stenosis is   limited due to lack mammographic contrast.   3.  Incidental note of a long dialysis catheter within the inferior   vena cava.   Signed by Deejay Hoffman MD      Lower extremity venous duplex right   Final Result      XR pelvis 1-2 views   Final Result   Slightly limited secondary to underlying osteopenia.  No acute osseous   abnormality.   Signed by Roe Ruiz MD      IR CVC exchange    (Results Pending)   IR CVC removal    (Results Pending)            Assessment/Plan      Edwar Hemphill is a 38 y.o. male with a past medical history of end-stage renal disease on hemodialysis via right femoral TDC who resides at [facility]. 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  He later had stump debridement on 7/27 with wound VAC placement.  He had polymicrobial infection including resistant E coli, Acinetobacter baumannii and Enterobacter fecalis. He was admitted last September with positive blood cultures growing Acinetobacter baumannii and Stenotrophomonas. He went to IR for dialysis line exchange.  He then was admitted again in November where CT noted left sided fluid collection concerning for abscess.  He grew MRSA and Streptococcus, catheter was exchanged on November 13, he completed vancomycin. He then had Staph aureus CLABSI again in January of this.  POLI negative for endocarditis. HD cath removed 1/16 and replaced on 1/19 to the L groin. IV Vancomycin 750mg with HD through 2/14/24.      He comes in now with fever with low back pain radiating down the right leg. He was feeling too sick and missed HD.  He was noted to be febrile.  Blood cultures were obtained and came back growing MRSA.  Infectious disease is following and managing antimicrobial coverage.  He remains bacteremic with MRSA despite guidewire exchange of the groin tunneled HD catheter on 5/3. MRI of the lumbar spine did not show osteomyelitis/discitis and 2 D ECHO was negative for a vegetation. We dialyzed him on Wednesday. We removed his line all together for a line holiday on 5/9. There is a risk that we may not be able to re-establish access but we have limited options.  We are monitoring him closely for RRT needs with the dialysis catheter out and there is no acute need. Blood cultures from 5/10 are without growth currently. Nephrology will follow closely with his care.        Principal Problem:    SIRS (systemic inflammatory  response syndrome) (Multi)      I spent 35 minutes in the professional and overall care of this patient.      Eber Bruce, DO

## 2024-05-11 NOTE — PROGRESS NOTES
Pt still without access. Nephrology following. Plan is to return to [facility] when medically ready.

## 2024-05-11 NOTE — PROGRESS NOTES
"Vancomycin Dosing by Pharmacy- FOLLOW UP    Edwar Hemphill is a 39 y.o. year old male who Pharmacy has been consulted for vancomycin dosing for line infections/bacteremia. Based on the patient's indication and renal status this patient is being dosed based on a goal pre-HD level of 15-25.     Renal function is currently stable. ESRD on HD, currently on line holiday    Current vancomycin dose: dose by level. Last received 1g on 5/6.    Most recent random level: 19.7 mcg/mL    Visit Vitals  /90   Pulse 104   Temp 37 °C (98.6 °F)   Resp 18        Lab Results   Component Value Date    CREATININE 6.99 (H) 05/11/2024    CREATININE 5.78 (H) 05/10/2024    CREATININE 6.29 (H) 05/08/2024    CREATININE 5.14 (H) 05/07/2024        Patient weight is No results found for: \"PTWEIGHT\"    No results found for: \"CULTURE\"     No intake/output data recorded.  [unfilled]    Lab Results   Component Value Date    PATIENTTEMP 37.0 05/04/2024    PATIENTTEMP 37.0 01/14/2024    PATIENTTEMP 37.0 07/09/2023    PATIENTTEMP 37.0 07/08/2023    PATIENTTEMP 37.0 07/08/2023        Assessment/Plan    Within goal random/trough level. Will give 500mg x1 today.  The next level will be obtained on 5/12 at AM labs. May be obtained sooner if clinically indicated.   Will continue to monitor renal function daily while on vancomycin and order serum creatinine at least every 48 hours if not already ordered.  Follow for continued vancomycin needs, clinical response, and signs/symptoms of toxicity.       [place], PharmD           "

## 2024-05-11 NOTE — PROGRESS NOTES
Edwar Hemphill is a 39 y.o. male on day 11 of admission presenting with SIRS (systemic inflammatory response syndrome) (Multi).      Subjective   Line removed for line holiday  No fever   Back pain always there, medications make him sleepy       Objective     Last Recorded Vitals  /72 (BP Location: Left arm, Patient Position: Sitting)   Pulse (!) 111   Temp 36.8 °C (98.2 °F) (Oral)   Resp 18   Wt 70.5 kg (155 lb 6.8 oz)   SpO2 91%   Intake/Output last 3 Shifts:  No intake or output data in the 24 hours ending 05/11/24 1027    Admission Weight  Weight: 65.8 kg (145 lb) (04/30/24 0825)    Daily Weight  05/08/24 : 70.5 kg (155 lb 6.8 oz)    Image Results  MR lumbar spine wo IV contrast  Narrative: Interpreted By:  Eva Og and Muddasani Dheeraj   STUDY:  MR LUMBAR SPINE WO IV CONTRAST      INDICATION:  Signs/Symptoms:back pain new with bacteremia, possible infection      COMPARISON:  CT of the lumbar spine 04/30/2024. Body CT 01/14/2024.      ACCESSION NUMBER(S):  [id number]      ORDERING CLINICIAN:  ALKA VIEYRA      TECHNIQUE:  Sagittal T1, sagittal T2, sagittal STIR, axial T1 and axial T2  weighted MRI images of the lumbar spine were obtained without  intravenous contrast administration.      FINDINGS:  Image quality is somewhat degraded due to motion and technique.      There are 5 non-rib-bearing lumbar-type vertebral bodies. The last  well-formed intervertebral disc is labeled L5-S1.      Minimal retrolisthesis at L5-S1. Alignment is otherwise normal.      Vertebral body heights are maintained. Disc spaces are relatively  maintained.      There is diffusely hypointense bone marrow signal on the T1 weighted  images which may be related to renal osteodystrophy. Differential  considerations include anemia, reactive marrow or bone marrow  infiltrating process.      There is no increased STIR signal to suggest marrow edema. Multilevel  degenerative endplate changes include prominent Schmorl's nodes  most  pronounced at L3-4 where there are Modic type 2 changes and central  intradiscal T1 and T2 hypointense signal likely degenerative.      The conus medullaris terminates at L1-2. The visualized spinal cord,  conus medullaris and cauda equina demonstrate normal signal and  morphology.      Fatty atrophic changes involve the paravertebral musculature.  Partially visualized kidneys demonstrate atrophic changes with  multiple bilateral cysts, better characterized on previous body CT  dated 01/14/2024. Partially visualized catheter within the inferior  vena cava and right common iliac vein.      T10-11 on sagittal images only: No spinal canal or neural foraminal  stenosis.      T11-12 on sagittal images only: No spinal canal or neural foraminal  stenosis.      T12-L1: Facet arthropathy and ligamentum flavum thickening. No spinal  canal or neural foraminal stenosis.      L1-2: Facet arthropathy and ligamentum flavum thickening. No spinal  canal or neural foraminal stenosis.      L2-3: Facet arthropathy and ligamentum flavum thickening. No spinal  canal or neural foraminal stenosis.      L3-4: Disc bulge, facet arthropathy and ligamentum flavum thickening.  No spinal canal or neural foraminal stenosis.      L4-5: Disc bulge with small central protrusion, facet arthropathy and  ligamentum flavum thickening. No spinal canal stenosis. Mild  bilateral neural foraminal stenosis.      L5-S1: Disc bulge, facet arthropathy and ligamentum flavum  thickening. No spinal canal stenosis. Mild right and no left neural  foraminal stenosis.      Degenerative changes are noted at the sacroiliac joints.      Impression: Diffusely hypointense bone marrow signal on the T1 weighted images  may be related to renal osteodystrophy. Differential considerations  include anemia, reactive bone marrow or bone marrow infiltrating  process.      No definite imaging findings to suggest discitis osteomyelitis on the  current exam.      Degenerative  changes without significant spinal canal stenosis.  Varying degrees of mild neural foraminal narrowing as detailed above.      I personally reviewed the images/study and I agree with the findings  as stated by Dr. Lowe.      MACRO:  None      Signed by: Eva Og 5/4/2024 11:11 AM  Dictation workstation:   [id number]      PHYSICAL EXAM  NEUROLOGICAL: No abnormal movements, no changes from before  HEENT: Head atraumatic, perrl,eomi, no oral lesions, no ear rash   NECK: Supple, no ln, jvp flat, thyroid palp  HEART: S1, S2, no added sound  LUNGS: CTAB, no crackles, no rales, no wheezing, no dullness to percussion, sym exp  EXTREMITIES: no edema  ABDOMEN: Soft, nontender, bowel sound positive, no organomegaly  SKIN: No change  EYES: No changes, perrl, eomi,   ENT: No change    JOINT: No change  Relevant Results               Assessment/Plan                  Principal Problem:    SIRS (systemic inflammatory response syndrome) (Multi)    Esrd  Back pain --mri neg for abscess   T2dm  Pvd  Hyponatremia   Anemia of renal origin    PLAN  Bc drawn on 5/10, if negative on 5/13 , line and dc      t32         Mauricio Estrada MD

## 2024-05-12 LAB
ALBUMIN SERPL BCP-MCNC: 3 G/DL (ref 3.4–5)
ANION GAP SERPL CALC-SCNC: 16 MMOL/L (ref 10–20)
BUN SERPL-MCNC: 53 MG/DL (ref 6–23)
CALCIUM SERPL-MCNC: 9.2 MG/DL (ref 8.6–10.3)
CHLORIDE SERPL-SCNC: 94 MMOL/L (ref 98–107)
CO2 SERPL-SCNC: 26 MMOL/L (ref 21–32)
CREAT SERPL-MCNC: 7.61 MG/DL (ref 0.5–1.3)
EGFRCR SERPLBLD CKD-EPI 2021: 9 ML/MIN/1.73M*2
GLUCOSE BLD MANUAL STRIP-MCNC: 106 MG/DL (ref 74–99)
GLUCOSE BLD MANUAL STRIP-MCNC: 135 MG/DL (ref 74–99)
GLUCOSE BLD MANUAL STRIP-MCNC: 156 MG/DL (ref 74–99)
GLUCOSE BLD MANUAL STRIP-MCNC: 163 MG/DL (ref 74–99)
GLUCOSE SERPL-MCNC: 164 MG/DL (ref 74–99)
HOLD SPECIMEN: NORMAL
PHOSPHATE SERPL-MCNC: 5.7 MG/DL (ref 2.5–4.9)
POTASSIUM SERPL-SCNC: 5.9 MMOL/L (ref 3.5–5.3)
SODIUM SERPL-SCNC: 130 MMOL/L (ref 136–145)
VANCOMYCIN SERPL-MCNC: 23.9 UG/ML (ref 5–20)

## 2024-05-12 PROCEDURE — 2500000006 HC RX 250 W HCPCS SELF ADMINISTERED DRUGS (ALT 637 FOR ALL PAYERS): Mod: MUE | Performed by: INTERNAL MEDICINE

## 2024-05-12 PROCEDURE — 2500000006 HC RX 250 W HCPCS SELF ADMINISTERED DRUGS (ALT 637 FOR ALL PAYERS): Performed by: NURSE PRACTITIONER

## 2024-05-12 PROCEDURE — C9113 INJ PANTOPRAZOLE SODIUM, VIA: HCPCS

## 2024-05-12 PROCEDURE — 80069 RENAL FUNCTION PANEL: CPT | Performed by: INTERNAL MEDICINE

## 2024-05-12 PROCEDURE — 2500000004 HC RX 250 GENERAL PHARMACY W/ HCPCS (ALT 636 FOR OP/ED)

## 2024-05-12 PROCEDURE — 2500000001 HC RX 250 WO HCPCS SELF ADMINISTERED DRUGS (ALT 637 FOR MEDICARE OP): Performed by: NURSE PRACTITIONER

## 2024-05-12 PROCEDURE — 99232 SBSQ HOSP IP/OBS MODERATE 35: CPT | Performed by: INTERNAL MEDICINE

## 2024-05-12 PROCEDURE — 80202 ASSAY OF VANCOMYCIN: CPT | Performed by: INTERNAL MEDICINE

## 2024-05-12 PROCEDURE — 1100000001 HC PRIVATE ROOM DAILY

## 2024-05-12 PROCEDURE — 82947 ASSAY GLUCOSE BLOOD QUANT: CPT

## 2024-05-12 PROCEDURE — 2500000002 HC RX 250 W HCPCS SELF ADMINISTERED DRUGS (ALT 637 FOR MEDICARE OP, ALT 636 FOR OP/ED): Performed by: SURGERY

## 2024-05-12 PROCEDURE — 2500000002 HC RX 250 W HCPCS SELF ADMINISTERED DRUGS (ALT 637 FOR MEDICARE OP, ALT 636 FOR OP/ED): Performed by: INTERNAL MEDICINE

## 2024-05-12 PROCEDURE — 36415 COLL VENOUS BLD VENIPUNCTURE: CPT | Performed by: INTERNAL MEDICINE

## 2024-05-12 RX ADMIN — SEVELAMER CARBONATE 2400 MG: 800 TABLET, FILM COATED ORAL at 12:16

## 2024-05-12 RX ADMIN — PANTOPRAZOLE SODIUM 40 MG: 40 INJECTION, POWDER, FOR SOLUTION INTRAVENOUS at 22:39

## 2024-05-12 RX ADMIN — OXYCODONE HYDROCHLORIDE 5 MG: 5 TABLET ORAL at 20:10

## 2024-05-12 RX ADMIN — SENNOSIDES 8.6 MG: 8.6 TABLET, FILM COATED ORAL at 22:39

## 2024-05-12 RX ADMIN — SODIUM ZIRCONIUM CYCLOSILICATE 10 G: 10 POWDER, FOR SUSPENSION ORAL at 11:26

## 2024-05-12 RX ADMIN — SEVELAMER CARBONATE 2400 MG: 800 TABLET, FILM COATED ORAL at 08:52

## 2024-05-12 RX ADMIN — NEPHROCAP 1 CAPSULE: 1 CAP ORAL at 08:52

## 2024-05-12 RX ADMIN — ATORVASTATIN CALCIUM 40 MG: 40 TABLET, FILM COATED ORAL at 22:39

## 2024-05-12 RX ADMIN — SODIUM ZIRCONIUM CYCLOSILICATE 10 G: 10 POWDER, FOR SUSPENSION ORAL at 22:39

## 2024-05-12 RX ADMIN — OXYCODONE HYDROCHLORIDE 5 MG: 5 TABLET ORAL at 06:47

## 2024-05-12 RX ADMIN — LEVOTHYROXINE SODIUM 125 MCG: 125 TABLET ORAL at 06:43

## 2024-05-12 RX ADMIN — SEVELAMER CARBONATE 2400 MG: 800 TABLET, FILM COATED ORAL at 17:12

## 2024-05-12 RX ADMIN — PANTOPRAZOLE SODIUM 40 MG: 40 INJECTION, POWDER, FOR SOLUTION INTRAVENOUS at 08:52

## 2024-05-12 ASSESSMENT — PAIN SCALES - GENERAL
PAINLEVEL_OUTOF10: 6
PAINLEVEL_OUTOF10: 8
PAINLEVEL_OUTOF10: 4

## 2024-05-12 ASSESSMENT — COGNITIVE AND FUNCTIONAL STATUS - GENERAL
DRESSING REGULAR LOWER BODY CLOTHING: TOTAL
MOVING TO AND FROM BED TO CHAIR: A LOT
TURNING FROM BACK TO SIDE WHILE IN FLAT BAD: A LITTLE
DAILY ACTIVITIY SCORE: 10
HELP NEEDED FOR BATHING: TOTAL
CLIMB 3 TO 5 STEPS WITH RAILING: TOTAL
PERSONAL GROOMING: A LOT
HELP NEEDED FOR BATHING: TOTAL
DRESSING REGULAR LOWER BODY CLOTHING: TOTAL
WALKING IN HOSPITAL ROOM: TOTAL
WALKING IN HOSPITAL ROOM: TOTAL
TOILETING: TOTAL
DAILY ACTIVITIY SCORE: 11
EATING MEALS: A LITTLE
TOILETING: TOTAL
DRESSING REGULAR UPPER BODY CLOTHING: A LOT
PERSONAL GROOMING: A LOT
TURNING FROM BACK TO SIDE WHILE IN FLAT BAD: A LITTLE
STANDING UP FROM CHAIR USING ARMS: TOTAL
STANDING UP FROM CHAIR USING ARMS: TOTAL
DRESSING REGULAR UPPER BODY CLOTHING: A LOT
MOVING FROM LYING ON BACK TO SITTING ON SIDE OF FLAT BED WITH BEDRAILS: A LITTLE
MOVING TO AND FROM BED TO CHAIR: A LOT
MOBILITY SCORE: 11
MOVING FROM LYING ON BACK TO SITTING ON SIDE OF FLAT BED WITH BEDRAILS: A LITTLE
MOBILITY SCORE: 11
CLIMB 3 TO 5 STEPS WITH RAILING: TOTAL

## 2024-05-12 ASSESSMENT — PAIN - FUNCTIONAL ASSESSMENT
PAIN_FUNCTIONAL_ASSESSMENT: 0-10

## 2024-05-12 NOTE — CARE PLAN
The patient's goals for the shift include      The clinical goals for the shift include Pt will remain HDS    Problem: Pain  Goal: My pain/discomfort is manageable  Outcome: Progressing     Problem: Safety  Goal: Patient will be injury free during hospitalization  Outcome: Progressing  Goal: I will remain free of falls  Outcome: Progressing     Problem: Daily Care  Goal: Daily care needs are met  Outcome: Progressing     Problem: Psychosocial Needs  Goal: Demonstrates ability to cope with hospitalization/illness  Outcome: Progressing  Goal: Collaborate with me, my family, and caregiver to identify my specific goals  Outcome: Progressing     Problem: Discharge Barriers  Goal: My discharge needs are met  Outcome: Progressing     Problem: Skin  Goal: Decreased wound size/increased tissue granulation at next dressing change  Outcome: Progressing  Goal: Participates in plan/prevention/treatment measures  Outcome: Progressing  Goal: Prevent/manage excess moisture  Outcome: Progressing  Goal: Prevent/minimize sheer/friction injuries  Outcome: Progressing  Goal: Promote/optimize nutrition  Outcome: Progressing  Goal: Promote skin healing  Outcome: Progressing     Problem: Fall/Injury  Goal: Not fall by end of shift  Outcome: Progressing  Goal: Be free from injury by end of the shift  Outcome: Progressing  Goal: Verbalize understanding of personal risk factors for fall in the hospital  Outcome: Progressing  Goal: Verbalize understanding of risk factor reduction measures to prevent injury from fall in the home  Outcome: Progressing  Goal: Use assistive devices by end of the shift  Outcome: Progressing  Goal: Pace activities to prevent fatigue by end of the shift  Outcome: Progressing     Problem: Pain - Adult  Goal: Verbalizes/displays adequate comfort level or baseline comfort level  Outcome: Progressing     Problem: Safety - Adult  Goal: Free from fall injury  Outcome: Progressing     Problem: Discharge Planning  Goal:  Discharge to home or other facility with appropriate resources  Outcome: Progressing     Problem: Chronic Conditions and Co-morbidities  Goal: Patient's chronic conditions and co-morbidity symptoms are monitored and maintained or improved  Outcome: Progressing     Problem: Diabetes  Goal: Achieve decreasing blood glucose levels by end of shift  Outcome: Progressing  Goal: Increase stability of blood glucose readings by end of shift  Outcome: Progressing  Goal: Decrease in ketones present in urine by end of shift  Outcome: Progressing  Goal: Maintain electrolyte levels within acceptable range throughout shift  Outcome: Progressing  Goal: Maintain glucose levels >70mg/dl to <250mg/dl throughout shift  Outcome: Progressing  Goal: No changes in neurological exam by end of shift  Outcome: Progressing  Goal: Learn about and adhere to nutrition recommendations by end of shift  Outcome: Progressing  Goal: Vital signs within normal range for age by end of shift  Outcome: Progressing  Goal: Increase self care and/or family involovement by end of shift  Outcome: Progressing  Goal: Receive DSME education by end of shift  Outcome: Progressing

## 2024-05-12 NOTE — PROGRESS NOTES
"Vancomycin Dosing by Pharmacy- FOLLOW UP    Edwar Hemphill is a 39 y.o. year old male who Pharmacy has been consulted for vancomycin dosing for line infections/ bacteremia. Based on the patient's indication and renal status this patient is being dosed based on a goal pre-HD level of 15-25.     Renal function is currently stable. ESRD on HD. Currently on line holiday.    Current vancomycin dose:  dose by level. Last received 500mg 5/11 @ 0915  Most recent random level: 23.9 mcg/mL    Visit Vitals  /63 (BP Location: Left arm, Patient Position: Sitting)   Pulse 101   Temp 36.9 °C (98.4 °F) (Oral)   Resp 17        Lab Results   Component Value Date    CREATININE 7.61 (H) 05/12/2024    CREATININE 6.99 (H) 05/11/2024    CREATININE 5.78 (H) 05/10/2024    CREATININE 6.29 (H) 05/08/2024        Patient weight is No results found for: \"PTWEIGHT\"    No results found for: \"CULTURE\"     I/O last 3 completed shifts:  In: 480 (6.8 mL/kg) [P.O.:480]  Out: - (0 mL/kg)   Weight: 70.5 kg   [unfilled]    Lab Results   Component Value Date    PATIENTTEMP 37.0 05/04/2024    PATIENTTEMP 37.0 01/14/2024    PATIENTTEMP 37.0 07/09/2023    PATIENTTEMP 37.0 07/08/2023    PATIENTTEMP 37.0 07/08/2023        Assessment/Plan    Within goal random/trough level. At higher end of goal range, will not give another vanco dose today. Anticipate redosing tomorrow with 500mg again.    The next level will be obtained on 5/13 at AM labs. May be obtained sooner if clinically indicated.   Will continue to monitor renal function daily while on vancomycin and order serum creatinine at least every 48 hours if not already ordered.  Follow for continued vancomycin needs, clinical response, and signs/symptoms of toxicity.       [place], PharmD           "

## 2024-05-12 NOTE — PROGRESS NOTES
Edwar Hemphill is a 39 y.o. male on day 12 of admission presenting with SIRS (systemic inflammatory response syndrome) (Multi).      Subjective   Patient seen and examined.  Sitting in his motorized chair.   Blood cultures now positive from 5/10.  Awake, alert and lucid.   No acute events. He does not offer specific complaints.        Objective          Vitals 24HR  Heart Rate:  [101-111]   Temp:  [36.5 °C (97.7 °F)-37.1 °C (98.8 °F)]   Resp:  [17-18]   BP: (119-133)/(58-86)   SpO2:  [94 %-98 %]     Intake/Output last 3 Shifts:    Intake/Output Summary (Last 24 hours) at 5/12/2024 1108  Last data filed at 5/11/2024 1907  Gross per 24 hour   Intake 360 ml   Output --   Net 360 ml         Physical Exam  Constitutional: Well developed, awake/alert/oriented  x3   Eyes: Normal conjunctiva    ENMT: mucous membranes moist   Head/Neck: Neck is supple  Difficult to assess JVD   Respiratory/Thorax: Diminished bibasilar breath sounds,  no wheezing, rales or rhonchi   Cardiovascular: regular rhythm, normal  S1 and S2   Gastrointestinal: Nondistended, soft, non-tender,  no rebound tenderness or guarding   Genitourinary: No Castanon   Extremities: Rt leg edema   L AKA   RUE amputation  R femoral HD catheter   Neurological: No asterixis   Psychological: Appropriate mood and behavior   Skin: L AKA stump  Right femoral TDC     Scheduled Medications  [Held by provider] apixaban, 5 mg, oral, BID  atorvastatin, 40 mg, oral, Nightly  B complex-vitamin C-folic acid, 1 capsule, oral, Daily  epoetin tyson or biosimilar, 100 Units/kg, subcutaneous, Every Mon/Wed/Fri  heparin, 3,100 Units, intra-catheter, After Dialysis  heparin, 3,100 Units, intra-catheter, After Dialysis  insulin glargine, 4 Units, subcutaneous, Nightly  insulin lispro, 0-5 Units, subcutaneous, TID with meals  levothyroxine, 125 mcg, oral, Daily before breakfast  pantoprazole, 40 mg, intravenous, BID  polyethylene glycol, 17 g, oral, Daily  sennosides, 8.6 mg, oral,  BID  sevelamer carbonate, 2,400 mg, oral, TID with meals  sodium zirconium cyclosilicate, 10 g, oral, BID  [START ON 5/13/2024] sodium zirconium cyclosilicate, 10 g, oral, Daily      Continuous medications  oxygen, , Last Rate: 2 L/min (05/03/24 1758)        PRN medications: cyclobenzaprine, dextrose, dextrose, fentaNYL PF, glucagon, heparin, lidocaine PF, loperamide, midazolam, nitroglycerin, ondansetron, oxyCODONE, oxygen, simethicone, vancomycin     Relevant Results  Results from last 7 days   Lab Units 05/11/24  0631 05/10/24  0527 05/07/24  0631   WBC AUTO x10*3/uL 7.0 7.3 8.0   HEMOGLOBIN g/dL 8.2* 7.7* 9.6*   HEMATOCRIT % 27.1* 23.8* 30.3*   PLATELETS AUTO x10*3/uL 304 236 166     Results from last 7 days   Lab Units 05/12/24  0710 05/11/24  0631 05/10/24  0527   SODIUM mmol/L 130* 132* 132*   POTASSIUM mmol/L 5.9* 5.0 4.8   CHLORIDE mmol/L 94* 94* 96*   CO2 mmol/L 26 26 27   BUN mg/dL 53* 51* 43*   CREATININE mg/dL 7.61* 6.99* 5.78*   GLUCOSE mg/dL 164* 122* 206*   CALCIUM mg/dL 9.2 9.6 9.0       MR lumbar spine wo IV contrast   Final Result   Diffusely hypointense bone marrow signal on the T1 weighted images   may be related to renal osteodystrophy. Differential considerations   include anemia, reactive bone marrow or bone marrow infiltrating   process.        No definite imaging findings to suggest discitis osteomyelitis on the   current exam.        Degenerative changes without significant spinal canal stenosis.   Varying degrees of mild neural foraminal narrowing as detailed above.        I personally reviewed the images/study and I agree with the findings   as stated by Dr. Lowe.        MACRO:   None        Signed by: Eva Og 5/4/2024 11:11 AM   Dictation workstation:   [id number]      IR CVC exchange   Final Result   Fluoroscopically guidewire exchange of right groin tunneled   hemodialysis catheter. The newly exchanged catheter is ready for   immediate use.             MACRO:   None         Signed by: Ronaldo Dempsey 5/3/2024 8:09 PM   Dictation workstation:   [id number]      Transthoracic Echo Complete   Final Result      XR chest 1 view   Final Result   Patchy right basilar infiltrate or atelectasis with small right   pleural effusion.   Signed by Joseph Overton MD      CT thoracic spine wo IV contrast   Final Result   Thoracic spine:   1.  Portions of T1 and T2 cough on this study.   2.  No acute osseous findings seen from T3 through T12.   3.  Tiny bilateral effusions with adjacent lower lobe infiltrates.   4.  The distal adenopathy.   5.  Coronary artery calcifications.   Lumbar spine:   1.  No compression deformities or spinal listhesis is noted.   2.  Disc bulging from L3 to S1.  Evaluation for spinal stenosis is   limited due to lack mammographic contrast.   3.  Incidental note of a long dialysis catheter within the inferior   vena cava.   Signed by Deejay Hoffman MD      CT lumbar spine wo IV contrast   Final Result   Thoracic spine:   1.  Portions of T1 and T2 cough on this study.   2.  No acute osseous findings seen from T3 through T12.   3.  Tiny bilateral effusions with adjacent lower lobe infiltrates.   4.  The distal adenopathy.   5.  Coronary artery calcifications.   Lumbar spine:   1.  No compression deformities or spinal listhesis is noted.   2.  Disc bulging from L3 to S1.  Evaluation for spinal stenosis is   limited due to lack mammographic contrast.   3.  Incidental note of a long dialysis catheter within the inferior   vena cava.   Signed by Deejay Hoffman MD      Lower extremity venous duplex right   Final Result      XR pelvis 1-2 views   Final Result   Slightly limited secondary to underlying osteopenia.  No acute osseous   abnormality.   Signed by Roe Ruiz MD      IR CVC exchange    (Results Pending)   IR CVC removal    (Results Pending)            Assessment/Plan      Edwar Hemphill is a 38 y.o. male with a past medical history of end-stage renal disease on  hemodialysis via right femoral TDC who resides at [facility]. 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  He later had stump debridement on 7/27 with wound VAC placement.  He had polymicrobial infection including resistant E coli, Acinetobacter baumannii and Enterobacter fecalis. He was admitted last September with positive blood cultures growing Acinetobacter baumannii and Stenotrophomonas. He went to IR for dialysis line exchange.  He then was admitted again in November where CT noted left sided fluid collection concerning for abscess.  He grew MRSA and Streptococcus, catheter was exchanged on November 13, he completed vancomycin. He then had Staph aureus CLABSI again in January of this.  POLI negative for endocarditis. HD cath removed 1/16 and replaced on 1/19 to the L groin. IV Vancomycin 750mg with HD through 2/14/24.      He comes in now with fever with low back pain radiating down the right leg. He was feeling too sick and missed HD.  He was noted to be febrile.  Blood cultures were obtained and came back growing MRSA. Infectious disease is following and managing antimicrobial coverage.  He remains bacteremic with MRSA despite guidewire exchange of the groin tunneled HD catheter on 5/3. MRI of the lumbar spine did not show osteomyelitis/discitis and 2 D ECHO was negative for a vegetation. We dialyzed him on Wednesday. We removed his line all together for a line holiday on 5/9. There is a risk that we may not be able to re-establish access but we have limited options.  Blood cultures from 5/10 are now positive.  Potassium is trending higher therefore I will order Lokelma.   His volume status is acceptable.  If he has refractory hyperkalemia we may need to place a temporary dialysis line but we will make that determination tomorrow.  In the meantime he will receive Lokelma.  I ordered repeat blood cultures.  Nephrology will follow closely with his care.    Principal Problem:    SIRS (systemic inflammatory response syndrome) (Multi)    I spent 35 minutes in the professional and overall care of this patient.      Eber Bruce, DO

## 2024-05-12 NOTE — NURSING NOTE
Pt refused insulin injection and phophate binder during the shift despite talks with pt about the work flow and [facility] practice on the floor

## 2024-05-12 NOTE — PROGRESS NOTES
Edwar Hemphill is a 39 y.o. male on day 12 of admission presenting with SIRS (systemic inflammatory response syndrome) (Multi).      Subjective   No new issues        Objective     Last Recorded Vitals  /63 (BP Location: Left arm, Patient Position: Sitting)   Pulse 101   Temp 36.9 °C (98.4 °F) (Oral)   Resp 17   Wt 70.5 kg (155 lb 6.8 oz)   SpO2 94%   Intake/Output last 3 Shifts:    Intake/Output Summary (Last 24 hours) at 5/12/2024 1112  Last data filed at 5/11/2024 1907  Gross per 24 hour   Intake 360 ml   Output --   Net 360 ml       Admission Weight  Weight: 65.8 kg (145 lb) (04/30/24 0825)    Daily Weight  05/08/24 : 70.5 kg (155 lb 6.8 oz)    Image Results  MR lumbar spine wo IV contrast  Narrative: Interpreted By:  Eva Og and Muddasani Dheeraj   STUDY:  MR LUMBAR SPINE WO IV CONTRAST      INDICATION:  Signs/Symptoms:back pain new with bacteremia, possible infection      COMPARISON:  CT of the lumbar spine 04/30/2024. Body CT 01/14/2024.      ACCESSION NUMBER(S):  [id number]      ORDERING CLINICIAN:  ALKA VIEYRA      TECHNIQUE:  Sagittal T1, sagittal T2, sagittal STIR, axial T1 and axial T2  weighted MRI images of the lumbar spine were obtained without  intravenous contrast administration.      FINDINGS:  Image quality is somewhat degraded due to motion and technique.      There are 5 non-rib-bearing lumbar-type vertebral bodies. The last  well-formed intervertebral disc is labeled L5-S1.      Minimal retrolisthesis at L5-S1. Alignment is otherwise normal.      Vertebral body heights are maintained. Disc spaces are relatively  maintained.      There is diffusely hypointense bone marrow signal on the T1 weighted  images which may be related to renal osteodystrophy. Differential  considerations include anemia, reactive marrow or bone marrow  infiltrating process.      There is no increased STIR signal to suggest marrow edema. Multilevel  degenerative endplate changes include prominent  Schmorl's nodes most  pronounced at L3-4 where there are Modic type 2 changes and central  intradiscal T1 and T2 hypointense signal likely degenerative.      The conus medullaris terminates at L1-2. The visualized spinal cord,  conus medullaris and cauda equina demonstrate normal signal and  morphology.      Fatty atrophic changes involve the paravertebral musculature.  Partially visualized kidneys demonstrate atrophic changes with  multiple bilateral cysts, better characterized on previous body CT  dated 01/14/2024. Partially visualized catheter within the inferior  vena cava and right common iliac vein.      T10-11 on sagittal images only: No spinal canal or neural foraminal  stenosis.      T11-12 on sagittal images only: No spinal canal or neural foraminal  stenosis.      T12-L1: Facet arthropathy and ligamentum flavum thickening. No spinal  canal or neural foraminal stenosis.      L1-2: Facet arthropathy and ligamentum flavum thickening. No spinal  canal or neural foraminal stenosis.      L2-3: Facet arthropathy and ligamentum flavum thickening. No spinal  canal or neural foraminal stenosis.      L3-4: Disc bulge, facet arthropathy and ligamentum flavum thickening.  No spinal canal or neural foraminal stenosis.      L4-5: Disc bulge with small central protrusion, facet arthropathy and  ligamentum flavum thickening. No spinal canal stenosis. Mild  bilateral neural foraminal stenosis.      L5-S1: Disc bulge, facet arthropathy and ligamentum flavum  thickening. No spinal canal stenosis. Mild right and no left neural  foraminal stenosis.      Degenerative changes are noted at the sacroiliac joints.      Impression: Diffusely hypointense bone marrow signal on the T1 weighted images  may be related to renal osteodystrophy. Differential considerations  include anemia, reactive bone marrow or bone marrow infiltrating  process.      No definite imaging findings to suggest discitis osteomyelitis on the  current exam.       Degenerative changes without significant spinal canal stenosis.  Varying degrees of mild neural foraminal narrowing as detailed above.      I personally reviewed the images/study and I agree with the findings  as stated by Dr. Lowe.      MACRO:  None      Signed by: Eva Og 5/4/2024 11:11 AM  Dictation workstation:   [id number]      PHYSICAL EXAM  NEUROLOGICAL: No abnormal movements, no changes from before  HEENT: Head atraumatic, perrl,eomi, no oral lesions, no ear rash   NECK: Supple, no ln, jvp flat, thyroid palp  HEART: S1, S2, no added sound  LUNGS: CTAB, no crackles, no rales, no wheezing, no dullness to percussion, sym exp  EXTREMITIES: no edema  ABDOMEN: Soft, nontender, bowel sound positive, no organomegaly  SKIN: No change  EYES: No changes, perrl, eomi,   ENT: No change    JOINT: No change  Relevant Results               Assessment/Plan                  Principal Problem:    SIRS (systemic inflammatory response syndrome) (Multi)    Hyperkalemia per renal   Esrd  T2dm  Anemia    PLAN  Once bc are neg, new line needed     t32            Mauricio Estrada MD

## 2024-05-12 NOTE — CARE PLAN
The patient's goals for the shift include  pain management    The clinical goals for the shift include pt will maintain comfort and safety this shift      Problem: Pain  Goal: My pain/discomfort is manageable  Outcome: Progressing     Problem: Safety  Goal: Patient will be injury free during hospitalization  Outcome: Progressing     Problem: Daily Care  Goal: Daily care needs are met  Outcome: Progressing     Problem: Psychosocial Needs  Goal: Demonstrates ability to cope with hospitalization/illness  Outcome: Progressing

## 2024-05-12 NOTE — PROGRESS NOTES
"Edwar Hemphill is a 39 y.o. male on day 12 of admission presenting with SIRS (systemic inflammatory response syndrome) (Multi).    Subjective   Glycemic control has been reviewed.     I have reviewed histories, allergies and medications have been reviewed and there are no changes       Objective     Physical Exam  Vitals and nursing note reviewed.   Constitutional:       General: He is not in acute distress.     Appearance: Normal appearance. He is normal weight.   HENT:      Head: Normocephalic and atraumatic.      Nose: Nose normal.      Mouth/Throat:      Mouth: Mucous membranes are moist.   Eyes:      Extraocular Movements: Extraocular movements intact.   Pulmonary:      Effort: Pulmonary effort is normal.   Neurological:      Mental Status: He is alert and oriented to person, place, and time.   Psychiatric:         Mood and Affect: Mood normal.         Last Recorded Vitals  Blood pressure 128/86, pulse (!) 111, temperature 36.9 °C (98.4 °F), temperature source Oral, resp. rate 18, height 1.753 m (5' 9\"), weight 70.5 kg (155 lb 6.8 oz), SpO2 94%.  Intake/Output last 3 Shifts:  I/O last 3 completed shifts:  In: 480 (6.8 mL/kg) [P.O.:480]  Out: - (0 mL/kg)   Weight: 70.5 kg     Relevant Results  Results from last 7 days   Lab Units 05/11/24  2118 05/11/24  1536 05/11/24  1125 05/11/24  0805 05/11/24  0631 05/10/24  2236 05/10/24  0744 05/10/24  0527 05/08/24  1137 05/08/24  0632 05/07/24  0753 05/07/24  0631 05/06/24  1134 05/06/24  0818   POCT GLUCOSE mg/dL 144* 163* 179* 134*  --  108*   < >  --    < >  --    < >  --    < >  --    GLUCOSE mg/dL  --   --   --   --  122*  --   --  206*  --  51*  --  114*  --  142*    < > = values in this interval not displayed.     Scheduled medications  [Held by provider] apixaban, 5 mg, oral, BID  atorvastatin, 40 mg, oral, Nightly  B complex-vitamin C-folic acid, 1 capsule, oral, Daily  epoetin tyson or biosimilar, 100 Units/kg, subcutaneous, Every Mon/Wed/Fri  heparin, 3,100 " Units, intra-catheter, After Dialysis  heparin, 3,100 Units, intra-catheter, After Dialysis  insulin glargine, 4 Units, subcutaneous, Nightly  insulin lispro, 0-5 Units, subcutaneous, TID with meals  levothyroxine, 125 mcg, oral, Daily before breakfast  pantoprazole, 40 mg, intravenous, BID  polyethylene glycol, 17 g, oral, Daily  sennosides, 8.6 mg, oral, BID  sevelamer carbonate, 2,400 mg, oral, TID with meals      Continuous medications  oxygen, , Last Rate: 2 L/min (05/03/24 1758)      PRN medications  PRN medications: cyclobenzaprine, dextrose, dextrose, fentaNYL PF, glucagon, heparin, lidocaine PF, loperamide, midazolam, nitroglycerin, ondansetron, oxyCODONE, oxygen, simethicone, vancomycin    Results for orders placed or performed during the hospital encounter of 04/30/24 (from the past 24 hour(s))   POCT GLUCOSE   Result Value Ref Range    POCT Glucose 134 (H) 74 - 99 mg/dL   POCT GLUCOSE   Result Value Ref Range    POCT Glucose 179 (H) 74 - 99 mg/dL   POCT GLUCOSE   Result Value Ref Range    POCT Glucose 163 (H) 74 - 99 mg/dL   POCT GLUCOSE   Result Value Ref Range    POCT Glucose 144 (H) 74 - 99 mg/dL      MR lumbar spine wo IV contrast    Result Date: 5/4/2024  Interpreted By:  Eva Og,  and Mynor Hernandez STUDY: MR LUMBAR SPINE WO IV CONTRAST   INDICATION: Signs/Symptoms:back pain new with bacteremia, possible infection   COMPARISON: CT of the lumbar spine 04/30/2024. Body CT 01/14/2024.   ACCESSION NUMBER(S): [id number]   ORDERING CLINICIAN: ALKA VIEYRA   TECHNIQUE: Sagittal T1, sagittal T2, sagittal STIR, axial T1 and axial T2 weighted MRI images of the lumbar spine were obtained without intravenous contrast administration.   FINDINGS: Image quality is somewhat degraded due to motion and technique.   There are 5 non-rib-bearing lumbar-type vertebral bodies. The last well-formed intervertebral disc is labeled L5-S1.   Minimal retrolisthesis at L5-S1. Alignment is otherwise normal.    Vertebral body heights are maintained. Disc spaces are relatively maintained.   There is diffusely hypointense bone marrow signal on the T1 weighted images which may be related to renal osteodystrophy. Differential considerations include anemia, reactive marrow or bone marrow infiltrating process.   There is no increased STIR signal to suggest marrow edema. Multilevel degenerative endplate changes include prominent Schmorl's nodes most pronounced at L3-4 where there are Modic type 2 changes and central intradiscal T1 and T2 hypointense signal likely degenerative.   The conus medullaris terminates at L1-2. The visualized spinal cord, conus medullaris and cauda equina demonstrate normal signal and morphology.   Fatty atrophic changes involve the paravertebral musculature. Partially visualized kidneys demonstrate atrophic changes with multiple bilateral cysts, better characterized on previous body CT dated 01/14/2024. Partially visualized catheter within the inferior vena cava and right common iliac vein.   T10-11 on sagittal images only: No spinal canal or neural foraminal stenosis.   T11-12 on sagittal images only: No spinal canal or neural foraminal stenosis.   T12-L1: Facet arthropathy and ligamentum flavum thickening. No spinal canal or neural foraminal stenosis.   L1-2: Facet arthropathy and ligamentum flavum thickening. No spinal canal or neural foraminal stenosis.   L2-3: Facet arthropathy and ligamentum flavum thickening. No spinal canal or neural foraminal stenosis.   L3-4: Disc bulge, facet arthropathy and ligamentum flavum thickening. No spinal canal or neural foraminal stenosis.   L4-5: Disc bulge with small central protrusion, facet arthropathy and ligamentum flavum thickening. No spinal canal stenosis. Mild bilateral neural foraminal stenosis.   L5-S1: Disc bulge, facet arthropathy and ligamentum flavum thickening. No spinal canal stenosis. Mild right and no left neural foraminal stenosis.   Degenerative  changes are noted at the sacroiliac joints.       Diffusely hypointense bone marrow signal on the T1 weighted images may be related to renal osteodystrophy. Differential considerations include anemia, reactive bone marrow or bone marrow infiltrating process.   No definite imaging findings to suggest discitis osteomyelitis on the current exam.   Degenerative changes without significant spinal canal stenosis. Varying degrees of mild neural foraminal narrowing as detailed above.   I personally reviewed the images/study and I agree with the findings as stated by Dr. Lowe.   MACRO: None   Signed by: Eva Og 5/4/2024 11:11 AM Dictation workstation:   [id number]    IR CVC exchange    Result Date: 5/3/2024  Interpreted By:  Ronaldo Dempsey, STUDY: IR CVC EXCHANGE; ;  5/3/2024 6:28 pm   FLUOROSCOPICALLY GUIDED EXCHANGE OF RIGHT GROIN TUNNELED HEMODIALYSIS CATHETER   INDICATION: Signs/Symptoms:Bacteremia- Right fem TDC exchange. 39-year-old man with end-stage renal disease, central thoracic venous occlusion, end-stage vascular access with bacteremia. Guidewire exchange of right groin tunneled hemodialysis catheter in an attempt to salvage the access.   COMPARISON: Chest radiograph 04/30/2024, fluoroscopic images from prior tunneled hemodialysis catheter placement 01/22/2024   ACCESSION NUMBER(S): [id number]   ORDERING CLINICIAN: SACHI MORTENSEN   TECHNIQUE:   ATTENDING : Ronaldo Dempsey M.D.   TECHNICAL DESCRIPTION/FINDINGS: The procedure, including all risks, benefits and alternatives were explained to the patient in detail. All questions were answered and written informed consent was obtained.   The patient was positioned supine on the angiography table. A time-out was performed.   The right groin was prepped and draped in usual sterile fashion. A  fluoroscopic image was obtained demonstrating the tunneled hemodialysis catheter terminating at the inferior cavoatrial junction.   1%  lidocaine was administered via the subcutaneous tunnel tract for local anesthesia. Blunt dissection was performed to free the subcutaneous catheter cuff. An 035 stiff Glidewire was then advanced through the catheter into the right atrium. The old catheter was removed. Over the guidewire, a new 14.5 Egyptian by 42 cm tip to cuff dual-lumen hemodialysis catheter was then placed. A completion fluoroscopic image demonstrates the catheter in the inferior right atrium.   Catheter lumens easily aspirated and flushed and were locked with a concentrated heparinized solution (1000 units/cc). The catheter hub was then sutured to the skin with 2 0 Prolene stay suture. Because of losing along the tract, Gel-Foam pledgets were then administered subcutaneously near where the catheter enters the skin for additional hemostasis, followed by a pursestring suture. A sterile dressing was applied.   SEDATION/MEDICATIONS: Continuous cardiopulmonary monitoring was performed by a radiology nurse for the duration of the procedure.  1 mg Versed and   50 mcg Fentanyl were administered for moderate conscious sedation time of 20 minutes.      10 cc 1% Lidocaine was administered subcutaneously for local anesthesia. SPECIMENS: None. ESTIMATED BLOOD LOSS:  5 cc FLOUROSCOPY:  1.2 minutes; DAP  69737 mGy-cm*2; Air Kerma  59.07 mGy CONTRAST: None.   FINDINGS: Test       Fluoroscopically guidewire exchange of right groin tunneled hemodialysis catheter. The newly exchanged catheter is ready for immediate use.     MACRO: None   Signed by: Ronaldo Dempsey 5/3/2024 8:09 PM Dictation workstation:   [id number]    Transthoracic Echo Complete    Result Date: 5/3/2024   [facility], [address] [place] [address]              Tel [phone] and Fax [phone] TRANSTHORACIC ECHOCARDIOGRAM REPORT  Patient Name:      XIOMARA Maya Physician:    [id number] Brandon Nur                                                                DO Study Date:        5/3/2024             Ordering Provider:    [id number] ALKA VIEYRA MRN/PID:           [id number]             Fellow: Accession#:        [id number]         Nurse: Date of Birth/Age: 1985 / 39 years Sonographer:          Sy Lyons RDCS Gender:            M                    Additional Staff: Height:            175.00 cm            Admit Date: Weight:            65.80 kg             Admission Status:     Inpatient -                                                               Routine BSA / BMI:         1.80 m2 / 21.49      Encounter#:           [id number]                    kg/m2                                         Department Location:  [facility] Non                                                               Invasive Blood Pressure: 112 /74 mmHg Study Type:    TRANSTHORACIC ECHO (TTE) COMPLETE Diagnosis/ICD: Endocarditis, valve unspecified-I38 Indication:    endocarditis CPT Code:      Echo Complete w Full [id number] Patient History: Pertinent History: HTN and Hyperlipidemia. Study Detail: The following Echo studies were performed: M-Mode, Doppler, color               flow and 2D.  PHYSICIAN INTERPRETATION: Left Ventricle: The left ventricular systolic function is moderately decreased, with an estimated ejection fraction of 35-40%. There are no regional wall motion abnormalities. The left ventricular cavity size is normal. Abnormal (paradoxical) septal motion, consistent with left bundle branch block. Spectral Doppler shows an impaired relaxation pattern of left ventricular diastolic filling. Left Atrium: The left atrium is normal in size. Right Ventricle: The right ventricle is normal in size. There is normal right ventricular global systolic function. Right Atrium: The right atrium is normal in size. Aortic Valve: The aortic valve is probably trileaflet. There is no evidence of aortic valve regurgitation. Mitral Valve: The mitral valve is mildly thickened. There is trace mitral  valve regurgitation. Tricuspid Valve: The tricuspid valve is structurally normal. No evidence of tricuspid regurgitation. Pulmonic Valve: The pulmonic valve is structurally normal. There is no indication of pulmonic valve regurgitation. Pericardium: There is no pericardial effusion noted. Aorta: The aortic root is normal. In comparison to the previous echocardiogram(s): Compared with study from 1/16/2024, LV function has declined. Was previously normal.  CONCLUSIONS:  1. Left ventricular systolic function is moderately decreased with a 35-40% estimated ejection fraction.  2. Abnormal septal motion consistent with left bundle branch block.  3. Spectral Doppler shows an impaired relaxation pattern of left ventricular diastolic filling.  4. No vegetations visualized within the limitations of this study.  5. Compared with study from 1/16/2024, LV function has declined. Was previously normal. QUANTITATIVE DATA SUMMARY: LA VOLUME:                               Normal Ranges: LA Vol A4C:        40.1 ml    (22+/-6mL/m2) LA Vol A2C:        35.3 ml LA Vol BP:         40.9 ml LA Vol Index A4C:  22.3ml/m2 LA Vol Index A2C:  19.6 ml/m2 LA Vol Index BP:   22.7 ml/m2 LA Area A4C:       15.3 cm2 LA Area A2C:       13.2 cm2 LA Major Axis A4C: 5.0 cm LA Major Axis A2C: 4.2 cm LA Volume Index:   22.7 ml/m2  [id number] Brandon Nur DO Electronically signed on 5/3/2024 at 4:24:04 PM  ** Final **     Lower extremity venous duplex right    Result Date: 5/1/2024             [place] [address]   Tel [phone] and Fax [phone]  Vascular Lab Report [facility] LOWER EXTREMITY VENOUS DUPLEX RIGHT  Patient Name:      XIOMARA Maya Physician:  [id number] Rayo Jauregui MD, RPVI Study Date:        4/30/2024            Ordering Physician: Julia FARMER MRN/PID:           [id number]             Technologist:       Megan Shay RVCARIN  Accession#:        [id number]         Technologist 2: Date of Birth/Age: 1985 / 39 years Encounter#:         [id number] Gender:            M Admission Status:  Emergency            Location Performed: [facility]  Diagnosis/ICD: Pain in right leg-M79.604 CPT Codes:     93334 Peripheral venous duplex scan for DVT Limited  **CRITICAL RESULT** Critical Result: Acute DVT in the right leg. Notification called to Deep Paniagua PA-C on 4/30/2024 at 10:52:21 AM by Megan Shay RDMS, RVT.  CONCLUSIONS: Right Lower Venous: There is acute non-occlusive deep vein thrombosis visualized in the common femoral vein. There are chronic changes visualized in the popliteal vein. Enlarged lymph node noted in the right groin. Waveforms suggestive of more distal obstruction/thrombus. May wish further means of imaging evaluation. Left Lower Venous: There are chronic changes visualized in the common femoral vein.  Comparison: Compared with study from 7/27/2023, New finding of acute non-occlusive thrombus in the right CFV.  Imaging & Doppler Findings:  Right                 Compressible      Thrombus          Flow Distal External Iliac                     None CFV                     Partial    Acute non-occlusive Continuous PFV                       Yes             None FV Proximal               Yes             None         Continuous FV Mid                    Yes             None FV Distal                 Yes             None Popliteal                 Yes            Chronic       Continuous Peroneal                  Yes             None PTV                       Yes             None  Left Compress Thrombus        Flow CFV    Yes    Chronic  Spontaneous/Phasic  [id number] Rayo Jauregui MD, RPVI Electronically signed by [id number] Raoy Jauregui MD, MARIANO on 5/1/2024 at 9:13:07 AM  ** Final **     XR chest 1 view    Result Date: 4/30/2024  STUDY: Chest Radiograph;  4/30/2024 at 2:42 PM. INDICATION: Fever. COMPARISON: XR chest 1/14/2024,  11/10/2023; CTA chest 2/15/2023. ACCESSION NUMBER(S): [id number] ORDERING CLINICIAN: WENDY INFANTE TECHNIQUE:  Frontal chest was obtained at 14:42 hours. FINDINGS: CARDIOMEDIASTINAL SILHOUETTE: Cardiomediastinal silhouette is normal in size and configuration.  LUNGS: Patchy right basilar infiltrate or atelectasis with small right pleural effusion. Left basilar scar versus atelectasis. ABDOMEN: No remarkable upper abdominal findings.  BONES: No acute osseous changes.    Patchy right basilar infiltrate or atelectasis with small right pleural effusion. Signed by Joseph Overton MD    CT thoracic spine wo IV contrast    Result Date: 4/30/2024  STUDY: CT Thoracic Spine and Lumbar Spine without IV Contrast; 4/30/2024 11:11 AM INDICATION: Midline back pain. COMPARISON: None Available. ACCESSION NUMBER(S): [id number], [id number] ORDERING CLINICIAN: RAJNI FARMER TECHNIQUE:  CT of the thoracic spine and lumbar spine was performed without intravenous or intrathecal contrast.  Sagittal and coronal reconstructions were generated.  Automated mA/kV exposure control was utilized and patient examination was performed in strict accordance with principles of ALARA. FINDINGS: Portions of the T1 vertebral body, as well as a small portion of the anterior superior aspect of T2 is cut off on this study.  No compression deformities are visualized within the thoracic vertebral bodies from T2 through T12.  No spondylolisthesis is noted.  No pedicular defects are noted.  No prominent edema is appreciated within the adjacent posterior paravertebral musculature.  Evaluation of the thecal sac is limited due to lack of myelographic contrast.  There are enlarged lymph nodes seen within the visualized portions of the mediastinum.  There are bilateral lower lobe infiltrates with tiny bilateral effusions.  Coronary artery calcifications are present. No compression deformities are visualized within the lumbar spine.  No pars defects are noted.   No significant spondylolisthesis is seen.  No prominent edema is appreciated within the visualized portions of the psoas musculature.  A long dialysis catheter is visualized within the inferior vena cava.  Evaluation of the thecal sac is limited due to lack of myelographic contrast.  There is disc bulging seen from L3 through S1.  No significant impingement is appreciated upon the neural foramina on the sagittal reconstructions.    Thoracic spine: 1.  Portions of T1 and T2 cough on this study. 2.  No acute osseous findings seen from T3 through T12. 3.  Tiny bilateral effusions with adjacent lower lobe infiltrates. 4.  The distal adenopathy. 5.  Coronary artery calcifications. Lumbar spine: 1.  No compression deformities or spinal listhesis is noted. 2.  Disc bulging from L3 to S1.  Evaluation for spinal stenosis is limited due to lack mammographic contrast. 3.  Incidental note of a long dialysis catheter within the inferior vena cava. Signed by Deejay Hoffman MD    CT lumbar spine wo IV contrast    Result Date: 4/30/2024  STUDY: CT Thoracic Spine and Lumbar Spine without IV Contrast; 4/30/2024 11:11 AM INDICATION: Midline back pain. COMPARISON: None Available. ACCESSION NUMBER(S): [id number], [id number] ORDERING CLINICIAN: RAJNI FARMER TECHNIQUE:  CT of the thoracic spine and lumbar spine was performed without intravenous or intrathecal contrast.  Sagittal and coronal reconstructions were generated.  Automated mA/kV exposure control was utilized and patient examination was performed in strict accordance with principles of ALARA. FINDINGS: Portions of the T1 vertebral body, as well as a small portion of the anterior superior aspect of T2 is cut off on this study.  No compression deformities are visualized within the thoracic vertebral bodies from T2 through T12.  No spondylolisthesis is noted.  No pedicular defects are noted.  No prominent edema is appreciated within the adjacent posterior paravertebral  musculature.  Evaluation of the thecal sac is limited due to lack of myelographic contrast.  There are enlarged lymph nodes seen within the visualized portions of the mediastinum.  There are bilateral lower lobe infiltrates with tiny bilateral effusions.  Coronary artery calcifications are present. No compression deformities are visualized within the lumbar spine.  No pars defects are noted.  No significant spondylolisthesis is seen.  No prominent edema is appreciated within the visualized portions of the psoas musculature.  A long dialysis catheter is visualized within the inferior vena cava.  Evaluation of the thecal sac is limited due to lack of myelographic contrast.  There is disc bulging seen from L3 through S1.  No significant impingement is appreciated upon the neural foramina on the sagittal reconstructions.    Thoracic spine: 1.  Portions of T1 and T2 cough on this study. 2.  No acute osseous findings seen from T3 through T12. 3.  Tiny bilateral effusions with adjacent lower lobe infiltrates. 4.  The distal adenopathy. 5.  Coronary artery calcifications. Lumbar spine: 1.  No compression deformities or spinal listhesis is noted. 2.  Disc bulging from L3 to S1.  Evaluation for spinal stenosis is limited due to lack mammographic contrast. 3.  Incidental note of a long dialysis catheter within the inferior vena cava. Signed by Deejay Hoffman MD    XR pelvis 1-2 views    Result Date: 4/30/2024  STUDY: Pelvis Radiographs; 4/30/2024 at 9:06 AM. INDICATION: Pain. COMPARISON: CT pelvis 7/17/2023. ACCESSION NUMBER(S): [id number] ORDERING CLINICIAN: RAJNI FARMER TECHNIQUE:  One view(s) of the pelvis. FINDINGS:  The pelvic ring is intact.  There is no acute fracture.  Severe underlying osteopenia.    Slightly limited secondary to underlying osteopenia.  No acute osseous abnormality. Signed by Roe Ruiz MD         Assessment/Plan   Principal Problem:    SIRS (systemic inflammatory response syndrome)  (Multi)    IMPRESSION  DIABETIC KETOACIDOSIS, RESOLVED  TYPE 2 DIABETES MELLITUS  LONG TERM CURRENT INSULIN USE  Glucose improved, no further hypoglycemia  History of ketoacidosis without basal insulin       RECOMMENDATIONS  To continue Lantus 4 units subcutaneous bedtime  To continue insulin correction scale TID AC   Diabetic diet as tolerated   Accu-Cheks ACHS    Hypoglycemic protocol   Will continue to follow    Dr. Santana will assume the service tomorrow         Gibson Fortune MD

## 2024-05-13 ENCOUNTER — APPOINTMENT (OUTPATIENT)
Dept: CARDIOLOGY | Facility: HOSPITAL | Age: 39
DRG: 314 | End: 2024-05-13
Payer: COMMERCIAL

## 2024-05-13 ENCOUNTER — APPOINTMENT (OUTPATIENT)
Dept: DIALYSIS | Facility: HOSPITAL | Age: 39
End: 2024-05-13
Payer: COMMERCIAL

## 2024-05-13 LAB
GLUCOSE BLD MANUAL STRIP-MCNC: 104 MG/DL (ref 74–99)
GLUCOSE BLD MANUAL STRIP-MCNC: 121 MG/DL (ref 74–99)
GLUCOSE BLD MANUAL STRIP-MCNC: 124 MG/DL (ref 74–99)
GLUCOSE BLD MANUAL STRIP-MCNC: 138 MG/DL (ref 74–99)

## 2024-05-13 PROCEDURE — 77001 FLUOROGUIDE FOR VEIN DEVICE: CPT | Performed by: RADIOLOGY

## 2024-05-13 PROCEDURE — 82947 ASSAY GLUCOSE BLOOD QUANT: CPT | Mod: 91

## 2024-05-13 PROCEDURE — 76937 US GUIDE VASCULAR ACCESS: CPT | Performed by: RADIOLOGY

## 2024-05-13 PROCEDURE — 06H033Z INSERTION OF INFUSION DEVICE INTO INFERIOR VENA CAVA, PERCUTANEOUS APPROACH: ICD-10-PCS | Performed by: RADIOLOGY

## 2024-05-13 PROCEDURE — 8010000001 HC DIALYSIS - HEMODIALYSIS PER DAY

## 2024-05-13 PROCEDURE — 2500000004 HC RX 250 GENERAL PHARMACY W/ HCPCS (ALT 636 FOR OP/ED)

## 2024-05-13 PROCEDURE — 2500000006 HC RX 250 W HCPCS SELF ADMINISTERED DRUGS (ALT 637 FOR ALL PAYERS): Performed by: NURSE PRACTITIONER

## 2024-05-13 PROCEDURE — 2500000004 HC RX 250 GENERAL PHARMACY W/ HCPCS (ALT 636 FOR OP/ED): Performed by: RADIOLOGY

## 2024-05-13 PROCEDURE — 76937 US GUIDE VASCULAR ACCESS: CPT

## 2024-05-13 PROCEDURE — 2500000005 HC RX 250 GENERAL PHARMACY W/O HCPCS: Performed by: RADIOLOGY

## 2024-05-13 PROCEDURE — C9113 INJ PANTOPRAZOLE SODIUM, VIA: HCPCS

## 2024-05-13 PROCEDURE — 2500000004 HC RX 250 GENERAL PHARMACY W/ HCPCS (ALT 636 FOR OP/ED): Performed by: PHARMACIST

## 2024-05-13 PROCEDURE — 2500000001 HC RX 250 WO HCPCS SELF ADMINISTERED DRUGS (ALT 637 FOR MEDICARE OP): Performed by: NURSE PRACTITIONER

## 2024-05-13 PROCEDURE — 2720000007 HC OR 272 NO HCPCS

## 2024-05-13 PROCEDURE — 36556 INSERT NON-TUNNEL CV CATH: CPT

## 2024-05-13 PROCEDURE — 1100000001 HC PRIVATE ROOM DAILY

## 2024-05-13 PROCEDURE — 99152 MOD SED SAME PHYS/QHP 5/>YRS: CPT | Performed by: RADIOLOGY

## 2024-05-13 PROCEDURE — C1752 CATH,HEMODIALYSIS,SHORT-TERM: HCPCS

## 2024-05-13 PROCEDURE — 77001 FLUOROGUIDE FOR VEIN DEVICE: CPT

## 2024-05-13 PROCEDURE — 36556 INSERT NON-TUNNEL CV CATH: CPT | Performed by: RADIOLOGY

## 2024-05-13 PROCEDURE — 99231 SBSQ HOSP IP/OBS SF/LOW 25: CPT | Performed by: INTERNAL MEDICINE

## 2024-05-13 RX ORDER — LIDOCAINE HYDROCHLORIDE 20 MG/ML
INJECTION, SOLUTION INFILTRATION; PERINEURAL AS NEEDED
Status: DISCONTINUED | OUTPATIENT
Start: 2024-05-13 | End: 2024-05-26 | Stop reason: HOSPADM

## 2024-05-13 RX ORDER — VANCOMYCIN HYDROCHLORIDE 500 MG/100ML
500 INJECTION, SOLUTION INTRAVENOUS ONCE
Status: COMPLETED | OUTPATIENT
Start: 2024-05-13 | End: 2024-05-14

## 2024-05-13 RX ORDER — SODIUM CHLORIDE 9 MG/ML
100 INJECTION, SOLUTION INTRAVENOUS CONTINUOUS
Status: CANCELLED | OUTPATIENT
Start: 2024-05-13

## 2024-05-13 RX ORDER — INSULIN GLARGINE 100 [IU]/ML
2 INJECTION, SOLUTION SUBCUTANEOUS NIGHTLY
Status: DISCONTINUED | OUTPATIENT
Start: 2024-05-13 | End: 2024-05-22

## 2024-05-13 RX ORDER — LIDOCAINE HYDROCHLORIDE 20 MG/ML
15 SOLUTION OROPHARYNGEAL ONCE
Status: CANCELLED | OUTPATIENT
Start: 2024-05-13 | End: 2024-05-13

## 2024-05-13 RX ADMIN — OXYCODONE HYDROCHLORIDE 5 MG: 5 TABLET ORAL at 15:56

## 2024-05-13 RX ADMIN — PANTOPRAZOLE SODIUM 40 MG: 40 INJECTION, POWDER, FOR SOLUTION INTRAVENOUS at 23:36

## 2024-05-13 RX ADMIN — LIDOCAINE HYDROCHLORIDE 5 ML: 20 INJECTION, SOLUTION INFILTRATION; PERINEURAL at 15:13

## 2024-05-13 RX ADMIN — HEPARIN SODIUM 1800 UNITS: 1000 INJECTION INTRAVENOUS; SUBCUTANEOUS at 23:29

## 2024-05-13 RX ADMIN — OXYCODONE HYDROCHLORIDE 5 MG: 5 TABLET ORAL at 23:35

## 2024-05-13 RX ADMIN — ATORVASTATIN CALCIUM 40 MG: 40 TABLET, FILM COATED ORAL at 23:35

## 2024-05-13 RX ADMIN — HEPARIN SODIUM 1800 UNITS: 1000 INJECTION INTRAVENOUS; SUBCUTANEOUS at 23:27

## 2024-05-13 RX ADMIN — OXYCODONE HYDROCHLORIDE 5 MG: 5 TABLET ORAL at 08:26

## 2024-05-13 RX ADMIN — MIDAZOLAM HYDROCHLORIDE 0.5 MG: 1 INJECTION, SOLUTION INTRAMUSCULAR; INTRAVENOUS at 15:13

## 2024-05-13 RX ADMIN — HEPARIN SODIUM 1000 UNITS: 1000 INJECTION INTRAVENOUS; SUBCUTANEOUS at 15:22

## 2024-05-13 RX ADMIN — SEVELAMER CARBONATE 2400 MG: 800 TABLET, FILM COATED ORAL at 16:00

## 2024-05-13 RX ADMIN — VANCOMYCIN HYDROCHLORIDE 500 MG: 500 INJECTION, SOLUTION INTRAVENOUS at 23:36

## 2024-05-13 RX ADMIN — FENTANYL CITRATE 25 MCG: 0.05 INJECTION, SOLUTION INTRAMUSCULAR; INTRAVENOUS at 15:13

## 2024-05-13 ASSESSMENT — COGNITIVE AND FUNCTIONAL STATUS - GENERAL
DAILY ACTIVITIY SCORE: 16
MOVING FROM LYING ON BACK TO SITTING ON SIDE OF FLAT BED WITH BEDRAILS: A LITTLE
MOBILITY SCORE: 11
CLIMB 3 TO 5 STEPS WITH RAILING: TOTAL
CLIMB 3 TO 5 STEPS WITH RAILING: TOTAL
DRESSING REGULAR UPPER BODY CLOTHING: A LITTLE
TOILETING: A LITTLE
DRESSING REGULAR LOWER BODY CLOTHING: A LOT
PERSONAL GROOMING: A LITTLE
WALKING IN HOSPITAL ROOM: TOTAL
MOBILITY SCORE: 11
EATING MEALS: A LITTLE
DAILY ACTIVITIY SCORE: 16
STANDING UP FROM CHAIR USING ARMS: TOTAL
TOILETING: A LITTLE
MOVING TO AND FROM BED TO CHAIR: A LOT
DRESSING REGULAR UPPER BODY CLOTHING: A LITTLE
MOVING FROM LYING ON BACK TO SITTING ON SIDE OF FLAT BED WITH BEDRAILS: A LITTLE
TURNING FROM BACK TO SIDE WHILE IN FLAT BAD: A LITTLE
PERSONAL GROOMING: A LITTLE
HELP NEEDED FOR BATHING: A LOT
HELP NEEDED FOR BATHING: A LOT
EATING MEALS: A LITTLE
TURNING FROM BACK TO SIDE WHILE IN FLAT BAD: A LITTLE
STANDING UP FROM CHAIR USING ARMS: TOTAL
WALKING IN HOSPITAL ROOM: TOTAL
DRESSING REGULAR LOWER BODY CLOTHING: A LOT
MOVING TO AND FROM BED TO CHAIR: A LOT

## 2024-05-13 ASSESSMENT — PAIN SCALES - GENERAL
PAINLEVEL_OUTOF10: 8
PAINLEVEL_OUTOF10: 6
PAINLEVEL_OUTOF10: 10 - WORST POSSIBLE PAIN
PAINLEVEL_OUTOF10: 10 - WORST POSSIBLE PAIN

## 2024-05-13 ASSESSMENT — PAIN - FUNCTIONAL ASSESSMENT
PAIN_FUNCTIONAL_ASSESSMENT: 0-10

## 2024-05-13 ASSESSMENT — PAIN DESCRIPTION - LOCATION
LOCATION: BACK

## 2024-05-13 ASSESSMENT — PAIN DESCRIPTION - ORIENTATION: ORIENTATION: MID

## 2024-05-13 ASSESSMENT — PAIN DESCRIPTION - DESCRIPTORS
DESCRIPTORS: THROBBING;ACHING
DESCRIPTORS: ACHING

## 2024-05-13 NOTE — INTERVAL H&P NOTE
H&P reviewed. The patient was examined and there are no changes to the H&P. Here for temporary HD cath placement with Dr. Dempsey 5/13/24.     AMARILYS Rousseau-CNP  Interventional Cath Lab/Cardiology  [facility]

## 2024-05-13 NOTE — PROGRESS NOTES
05/13/24 0957   Discharge Planning   [facility]     Patient not med ready blood cx on 5/10 still showing Gram + with cocci and clusters, will need negative blood cultures for new HD line placement. Once med ready plan is to discharge back to [facility], I will continue to monitor for discharge planning.

## 2024-05-13 NOTE — POST-PROCEDURE NOTE
Interventional Radiology Brief Postprocedure Note    Attending: Ke    Assistant: None    Diagnosis: ESRD    Description of procedure: R groin temp HD catheter, ready for use.     Anesthesia:  Local  0.5 mg Versed, 25 mcg Fentanyl    Complications: None    Estimated Blood Loss: minimal          See detailed result report with images in PACS.    The patient tolerated the procedure well without incident or complication and is in stable condition.

## 2024-05-13 NOTE — PROGRESS NOTES
Edwar Hemphill is a 39 y.o. male on day 13 of admission presenting with SIRS (systemic inflammatory response syndrome) (Multi).      Subjective   Patient seen and examined.  Sitting in his motorized chair.   Blood cultures now positive from 5/10.  Awake, alert and lucid.   No acute events. He does not offer specific complaints.        Objective          Vitals 24HR  Heart Rate:  [100-109]   Temp:  [36.6 °C (97.9 °F)-36.7 °C (98.1 °F)]   Resp:  [17-18]   BP: (111-135)/(68-86)   SpO2:  [96 %-99 %]     Intake/Output last 3 Shifts:    Intake/Output Summary (Last 24 hours) at 5/13/2024 1034  Last data filed at 5/12/2024 1830  Gross per 24 hour   Intake 480 ml   Output --   Net 480 ml         Physical Exam  Constitutional: Well developed, awake/alert/oriented  x3   Eyes: Normal conjunctiva    ENMT: mucous membranes moist   Head/Neck: Neck is supple  Difficult to assess JVD   Respiratory/Thorax: Diminished bibasilar breath sounds,  no wheezing, rales or rhonchi   Cardiovascular: regular rhythm, normal  S1 and S2   Gastrointestinal: Nondistended, soft, non-tender,  no rebound tenderness or guarding   Genitourinary: No Castanon   Extremities: Rt leg edema   L AKA   RUE amputation  R femoral HD catheter   Neurological: No asterixis   Psychological: Appropriate mood and behavior   Skin: L AKA stump  Right femoral TDC     Scheduled Medications  [Held by provider] apixaban, 5 mg, oral, BID  atorvastatin, 40 mg, oral, Nightly  B complex-vitamin C-folic acid, 1 capsule, oral, Daily  epoetin tyson or biosimilar, 100 Units/kg, subcutaneous, Every Mon/Wed/Fri  heparin, 3,100 Units, intra-catheter, After Dialysis  heparin, 3,100 Units, intra-catheter, After Dialysis  insulin glargine, 2 Units, subcutaneous, Nightly  insulin lispro, 0-5 Units, subcutaneous, TID with meals  levothyroxine, 125 mcg, oral, Daily before breakfast  pantoprazole, 40 mg, intravenous, BID  polyethylene glycol, 17 g, oral, Daily  sennosides, 8.6 mg, oral,  BID  sevelamer carbonate, 2,400 mg, oral, TID with meals  sodium zirconium cyclosilicate, 10 g, oral, Daily      Continuous medications  oxygen, , Last Rate: 2 L/min (05/03/24 6758)        PRN medications: cyclobenzaprine, dextrose, dextrose, fentaNYL PF, glucagon, heparin, lidocaine PF, loperamide, midazolam, nitroglycerin, ondansetron, oxyCODONE, oxygen, simethicone, vancomycin     Relevant Results  Results from last 7 days   Lab Units 05/11/24  0631 05/10/24  0527 05/07/24  0631   WBC AUTO x10*3/uL 7.0 7.3 8.0   HEMOGLOBIN g/dL 8.2* 7.7* 9.6*   HEMATOCRIT % 27.1* 23.8* 30.3*   PLATELETS AUTO x10*3/uL 304 236 166     Results from last 7 days   Lab Units 05/12/24  0710 05/11/24  0631 05/10/24  0527   SODIUM mmol/L 130* 132* 132*   POTASSIUM mmol/L 5.9* 5.0 4.8   CHLORIDE mmol/L 94* 94* 96*   CO2 mmol/L 26 26 27   BUN mg/dL 53* 51* 43*   CREATININE mg/dL 7.61* 6.99* 5.78*   GLUCOSE mg/dL 164* 122* 206*   CALCIUM mg/dL 9.2 9.6 9.0       MR lumbar spine wo IV contrast   Final Result   Diffusely hypointense bone marrow signal on the T1 weighted images   may be related to renal osteodystrophy. Differential considerations   include anemia, reactive bone marrow or bone marrow infiltrating   process.        No definite imaging findings to suggest discitis osteomyelitis on the   current exam.        Degenerative changes without significant spinal canal stenosis.   Varying degrees of mild neural foraminal narrowing as detailed above.        I personally reviewed the images/study and I agree with the findings   as stated by Dr. Lowe.        MACRO:   None        Signed by: Eva Og 5/4/2024 11:11 AM   Dictation workstation:   [id number]      IR CVC exchange   Final Result   Fluoroscopically guidewire exchange of right groin tunneled   hemodialysis catheter. The newly exchanged catheter is ready for   immediate use.             MACRO:   None        Signed by: Ronaldo Dempsey 5/3/2024 8:09 PM   Dictation workstation:    [id number]      Transthoracic Echo Complete   Final Result      XR chest 1 view   Final Result   Patchy right basilar infiltrate or atelectasis with small right   pleural effusion.   Signed by Joseph Overton MD      CT thoracic spine wo IV contrast   Final Result   Thoracic spine:   1.  Portions of T1 and T2 cough on this study.   2.  No acute osseous findings seen from T3 through T12.   3.  Tiny bilateral effusions with adjacent lower lobe infiltrates.   4.  The distal adenopathy.   5.  Coronary artery calcifications.   Lumbar spine:   1.  No compression deformities or spinal listhesis is noted.   2.  Disc bulging from L3 to S1.  Evaluation for spinal stenosis is   limited due to lack mammographic contrast.   3.  Incidental note of a long dialysis catheter within the inferior   vena cava.   Signed by Deejay Hoffman MD      CT lumbar spine wo IV contrast   Final Result   Thoracic spine:   1.  Portions of T1 and T2 cough on this study.   2.  No acute osseous findings seen from T3 through T12.   3.  Tiny bilateral effusions with adjacent lower lobe infiltrates.   4.  The distal adenopathy.   5.  Coronary artery calcifications.   Lumbar spine:   1.  No compression deformities or spinal listhesis is noted.   2.  Disc bulging from L3 to S1.  Evaluation for spinal stenosis is   limited due to lack mammographic contrast.   3.  Incidental note of a long dialysis catheter within the inferior   vena cava.   Signed by Deejay Hoffman MD      Lower extremity venous duplex right   Final Result      XR pelvis 1-2 views   Final Result   Slightly limited secondary to underlying osteopenia.  No acute osseous   abnormality.   Signed by Roe Ruiz MD      IR CVC exchange    (Results Pending)   IR CVC removal    (Results Pending)            Assessment/Plan      Edwar Hemphill is a 38 y.o. male with a past medical history of end-stage renal disease on hemodialysis via right femoral TDC who resides at [facility]. 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  He later had stump debridement on 7/27 with wound VAC placement.  He had polymicrobial infection including resistant E coli, Acinetobacter baumannii and Enterobacter fecalis. He was admitted last September with positive blood cultures growing Acinetobacter baumannii and Stenotrophomonas. He went to IR for dialysis line exchange.  He then was admitted again in November where CT noted left sided fluid collection concerning for abscess.  He grew MRSA and Streptococcus, catheter was exchanged on November 13, he completed vancomycin. He then had Staph aureus CLABSI again in January of this.  POLI negative for endocarditis. HD cath removed 1/16 and replaced on 1/19 to the L groin. IV Vancomycin 750mg with HD through 2/14/24.      He comes in now with fever with low back pain radiating down the right leg. He was feeling too sick and missed HD.  He was noted to be febrile.  Blood cultures were obtained and came back growing MRSA. Infectious disease is following and managing antimicrobial coverage.  He remains bacteremic with MRSA despite guidewire exchange of the groin tunneled HD catheter on 5/3. MRI of the lumbar spine did not show osteomyelitis/discitis and 2 D ECHO was negative for a vegetation. We dialyzed him on Wednesday. We removed his line all together for a line holiday on 5/9. There is a risk that we may not be able to re-establish access but we have limited options.  Blood cultures from 5/10 are now positive, I spoke with Dr. Jauregui.  He received Lokelma this morning, I will ask for a temporary dialysis line today, we will dialyze him today and a 2  potassium bath with minimal fluid removal.  He will need a POLI, I am not certain if a WBC scan would be helpful.      Principal Problem:    SIRS (systemic inflammatory response syndrome) (Multi)    I spent 55 minutes in the professional and overall care of this patient.      Zeeshan Gonzalez MD

## 2024-05-13 NOTE — PROGRESS NOTES
Edwar Hemphill is a 39 y.o. male on day 13 of admission presenting with SIRS (systemic inflammatory response syndrome) (Multi).      Subjective   No complains , no weakness or inc        Objective     Last Recorded Vitals  /86 (BP Location: Left arm, Patient Position: Lying)   Pulse 103   Temp 36.7 °C (98.1 °F) (Oral)   Resp 18   Wt 70.5 kg (155 lb 6.8 oz)   SpO2 96%   Intake/Output last 3 Shifts:    Intake/Output Summary (Last 24 hours) at 5/13/2024 0859  Last data filed at 5/12/2024 1830  Gross per 24 hour   Intake 690 ml   Output --   Net 690 ml       Admission Weight  Weight: 65.8 kg (145 lb) (04/30/24 0825)    Daily Weight  05/08/24 : 70.5 kg (155 lb 6.8 oz)    Image Results  MR lumbar spine wo IV contrast  Narrative: Interpreted By:  Eva Og,  Heather Hernandez   STUDY:  MR LUMBAR SPINE WO IV CONTRAST      INDICATION:  Signs/Symptoms:back pain new with bacteremia, possible infection      COMPARISON:  CT of the lumbar spine 04/30/2024. Body CT 01/14/2024.      ACCESSION NUMBER(S):  [id number]      ORDERING CLINICIAN:  ALKA VIEYRA      TECHNIQUE:  Sagittal T1, sagittal T2, sagittal STIR, axial T1 and axial T2  weighted MRI images of the lumbar spine were obtained without  intravenous contrast administration.      FINDINGS:  Image quality is somewhat degraded due to motion and technique.      There are 5 non-rib-bearing lumbar-type vertebral bodies. The last  well-formed intervertebral disc is labeled L5-S1.      Minimal retrolisthesis at L5-S1. Alignment is otherwise normal.      Vertebral body heights are maintained. Disc spaces are relatively  maintained.      There is diffusely hypointense bone marrow signal on the T1 weighted  images which may be related to renal osteodystrophy. Differential  considerations include anemia, reactive marrow or bone marrow  infiltrating process.      There is no increased STIR signal to suggest marrow edema. Multilevel  degenerative endplate changes  include prominent Schmorl's nodes most  pronounced at L3-4 where there are Modic type 2 changes and central  intradiscal T1 and T2 hypointense signal likely degenerative.      The conus medullaris terminates at L1-2. The visualized spinal cord,  conus medullaris and cauda equina demonstrate normal signal and  morphology.      Fatty atrophic changes involve the paravertebral musculature.  Partially visualized kidneys demonstrate atrophic changes with  multiple bilateral cysts, better characterized on previous body CT  dated 01/14/2024. Partially visualized catheter within the inferior  vena cava and right common iliac vein.      T10-11 on sagittal images only: No spinal canal or neural foraminal  stenosis.      T11-12 on sagittal images only: No spinal canal or neural foraminal  stenosis.      T12-L1: Facet arthropathy and ligamentum flavum thickening. No spinal  canal or neural foraminal stenosis.      L1-2: Facet arthropathy and ligamentum flavum thickening. No spinal  canal or neural foraminal stenosis.      L2-3: Facet arthropathy and ligamentum flavum thickening. No spinal  canal or neural foraminal stenosis.      L3-4: Disc bulge, facet arthropathy and ligamentum flavum thickening.  No spinal canal or neural foraminal stenosis.      L4-5: Disc bulge with small central protrusion, facet arthropathy and  ligamentum flavum thickening. No spinal canal stenosis. Mild  bilateral neural foraminal stenosis.      L5-S1: Disc bulge, facet arthropathy and ligamentum flavum  thickening. No spinal canal stenosis. Mild right and no left neural  foraminal stenosis.      Degenerative changes are noted at the sacroiliac joints.      Impression: Diffusely hypointense bone marrow signal on the T1 weighted images  may be related to renal osteodystrophy. Differential considerations  include anemia, reactive bone marrow or bone marrow infiltrating  process.      No definite imaging findings to suggest discitis osteomyelitis on  the  current exam.      Degenerative changes without significant spinal canal stenosis.  Varying degrees of mild neural foraminal narrowing as detailed above.      I personally reviewed the images/study and I agree with the findings  as stated by Dr. Lowe.      MACRO:  None      Signed by: Eva Og 5/4/2024 11:11 AM  Dictation workstation:   [id number]      PHYSICAL EXAM  NEUROLOGICAL: No abnormal movements, no changes from before  HEENT: Head atraumatic, perrl,eomi, no oral lesions, no ear rash   NECK: Supple, no ln, jvp flat, thyroid palp  HEART: S1, S2, no added sound  LUNGS: dec a/e  EXTREMITIES: no edema  ABDOMEN: Soft, nontender, bowel sound positive, no organomegaly  SKIN: No change  JOINT: No change  Relevant Results               Assessment/Plan          Esrd  T2dm  Anemia   Oa  pvd        Principal Problem:    SIRS (systemic inflammatory response syndrome) (Multi)    For line am if ok with id and then hd and then dc      t35         Mauricio Estrada MD

## 2024-05-13 NOTE — PROGRESS NOTES
"  INFECTIOUS DISEASE DAILY PROGRESS NOTE    SUBJECTIVE:    No fevers. Blood cx on 5/10 remain positive. He will need a temp HD catheter for dialysis, K starting to get high. D/w Dr. Gonzalez. Discussed with patient need for POLI now. Other than back pain which is overall better he has no specific complaints.    OBJECTIVE:  VITALS (Last 24 Hours)  /86 (BP Location: Left arm, Patient Position: Lying)   Pulse 103   Temp 36.7 °C (98.1 °F) (Oral)   Resp 18   Ht 1.753 m (5' 9\")   Wt 70.5 kg (155 lb 6.8 oz)   SpO2 96%   BMI 22.95 kg/m²     PHYSICAL EXAM:  Gen - not in distress  Abd - soft, no ttp, BS present  RLE - s/p AKA  RUE - s/p amputation  Skin - no rashes    ABX: IV Vancomycin    LABS:  Lab Results   Component Value Date    WBC 7.0 05/11/2024    HGB 8.2 (L) 05/11/2024    HCT 27.1 (L) 05/11/2024    MCV 94 05/11/2024     05/11/2024     Lab Results   Component Value Date    GLUCOSE 164 (H) 05/12/2024    CALCIUM 9.2 05/12/2024     (L) 05/12/2024    K 5.9 (H) 05/12/2024    CO2 26 05/12/2024    CL 94 (L) 05/12/2024    BUN 53 (H) 05/12/2024    CREATININE 7.61 (H) 05/12/2024     Results from last 72 hours   Lab Units 05/12/24  0710   ALBUMIN g/dL 3.0*       Estimated Creatinine Clearance: 12.9 mL/min (A) (by C-G formula based on SCr of 7.61 mg/dL (H)).    IMAGING:  MRI L-spine 5/3  IMPRESSION:  Diffusely hypointense bone marrow signal on the T1 weighted images  may be related to renal osteodystrophy. Differential considerations  include anemia, reactive bone marrow or bone marrow infiltrating  process.    No definite imaging findings to suggest discitis osteomyelitis on the  current exam.    Degenerative changes without significant spinal canal stenosis.  Varying degrees of mild neural foraminal narrowing as detailed above.      ASSESSMENT/PLAN:     CLABSI (HD cath POA) due to MRSA - line exchanged 5/3. Not having a full line holiday because of risk of losing HD access. TTE without endocarditis. Repeat " blood cx 5/5 and 5/7 still positive. HD cath removed on 5/9.  Lumbar Spine Pain - CT without epidural abscess. MRI without OM/discitis.  DM II with DKA - DKA resolved    Called micro lab and confirmed that HUMBERTO for Vancomycin is 1. Dapto/Ceftaroline are susceptible.     OK for temp HD catheter today as needs dialysis. I think we need a POLI now given this persistent bacteremia - will order for tomorrow. NPO after MN.    IV Vancomycin. Vanc is still therapeutic.    Monitoring for adverse effects of abx such as rash/itching/diarrhea - none apparent.    Will follow. Thanks!    Mat Jauregui MD  [facility]  Office #[phone]

## 2024-05-13 NOTE — PROGRESS NOTES
"Edwar Hemphill is a 39 y.o. male on day 13 of admission presenting with SIRS (systemic inflammatory response syndrome) (Multi).    Subjective   No new complaints  Refused Glargine 4 units last night     Scheduled medications  [Held by provider] apixaban, 5 mg, oral, BID  atorvastatin, 40 mg, oral, Nightly  B complex-vitamin C-folic acid, 1 capsule, oral, Daily  epoetin tyson or biosimilar, 100 Units/kg, subcutaneous, Every Mon/Wed/Fri  heparin, 3,100 Units, intra-catheter, After Dialysis  heparin, 3,100 Units, intra-catheter, After Dialysis  insulin glargine, 4 Units, subcutaneous, Nightly  insulin lispro, 0-5 Units, subcutaneous, TID with meals  levothyroxine, 125 mcg, oral, Daily before breakfast  pantoprazole, 40 mg, intravenous, BID  polyethylene glycol, 17 g, oral, Daily  sennosides, 8.6 mg, oral, BID  sevelamer carbonate, 2,400 mg, oral, TID with meals  sodium zirconium cyclosilicate, 10 g, oral, Daily      Objective   Physical Exam  Constitutional:       General: He is not in acute distress.  Neurological:      Mental Status: He is alert.         Last Recorded Vitals  Blood pressure 135/86, pulse 103, temperature 36.7 °C (98.1 °F), temperature source Oral, resp. rate 18, height 1.753 m (5' 9\"), weight 70.5 kg (155 lb 6.8 oz), SpO2 96%.  Intake/Output last 3 Shifts:  I/O last 3 completed shifts:  In: 930 (13.2 mL/kg) [P.O.:930]  Out: - (0 mL/kg)   Weight: 70.5 kg     Relevant Results  Results from last 7 days   Lab Units 05/12/24  2237 05/12/24  1536 05/12/24  1135 05/12/24  0754 05/12/24  0710 05/11/24  2118 05/11/24  0805 05/11/24  0631 05/10/24  0744 05/10/24  0527 05/08/24  1137 05/08/24  0632 05/07/24  0753 05/07/24  0631   POCT GLUCOSE mg/dL 106* 156* 135* 163*  --  144*   < >  --    < >  --    < >  --    < >  --    GLUCOSE mg/dL  --   --   --   --  164*  --   --  122*  --  206*  --  51*  --  114*    < > = values in this interval not displayed.       Assessment/Plan   Principal Problem:    SIRS (systemic " inflammatory response syndrome) (Multi)    IMPRESSION  DIABETIC KETOACIDOSIS, RESOLVED  TYPE 2 DIABETES MELLITUS  LONG TERM CURRENT INSULIN USE  Glargine refused last night         RECOMMENDATIONS  Patient agreed to Insulin glargine 2 units at bedtime, for now  Insulin lispro scale QAC    Mike Santana MD

## 2024-05-13 NOTE — PROGRESS NOTES
"Edwar Hemphill is a 39 y.o. male on day 13 of admission presenting with SIRS (systemic inflammatory response syndrome) (Multi).    Subjective   Complains of back pain today. Denies fever, chills, N/V or fatigue. Plan for a temporary HD catheter placement today.        Objective     Physical Exam  Constitutional:       General: He is not in acute distress.  Cardiovascular:      Rate and Rhythm: Normal rate and regular rhythm.   Pulmonary:      Effort: Pulmonary effort is normal. No respiratory distress.      Breath sounds: WNL  Abdominal:      General: Abdomen is flat. Bowel sounds are normal. There is no distension.      Palpations: Abdomen is soft, nontender, BS+  Neurological:      Mental Status: He is alert and oriented      Last Recorded Vitals  Blood pressure 135/86, pulse 103, temperature 36.7 °C (98.1 °F), temperature source Oral, resp. rate 18, height 1.753 m (5' 9\"), weight 70.5 kg (155 lb 6.8 oz), SpO2 96%.  Intake/Output last 3 Shifts:  I/O last 3 completed shifts:  In: 930 (13.2 mL/kg) [P.O.:930]  Out: - (0 mL/kg)   Weight: 70.5 kg     Relevant Results              Scheduled medications  [Held by provider] apixaban, 5 mg, oral, BID  atorvastatin, 40 mg, oral, Nightly  B complex-vitamin C-folic acid, 1 capsule, oral, Daily  epoetin tyson or biosimilar, 100 Units/kg, subcutaneous, Every Mon/Wed/Fri  heparin, 3,100 Units, intra-catheter, After Dialysis  heparin, 3,100 Units, intra-catheter, After Dialysis  insulin glargine, 2 Units, subcutaneous, Nightly  insulin lispro, 0-5 Units, subcutaneous, TID with meals  levothyroxine, 125 mcg, oral, Daily before breakfast  pantoprazole, 40 mg, intravenous, BID  polyethylene glycol, 17 g, oral, Daily  sennosides, 8.6 mg, oral, BID  sevelamer carbonate, 2,400 mg, oral, TID with meals  sodium zirconium cyclosilicate, 10 g, oral, Daily      Continuous medications  oxygen, , Last Rate: 2 L/min (05/03/24 1758)      PRN medications  PRN medications: cyclobenzaprine, dextrose, " dextrose, fentaNYL PF, glucagon, heparin, lidocaine PF, loperamide, midazolam, nitroglycerin, ondansetron, oxyCODONE, oxygen, simethicone, vancomycin  Results for orders placed or performed during the hospital encounter of 04/30/24 (from the past 24 hour(s))   POCT GLUCOSE   Result Value Ref Range    POCT Glucose 156 (H) 74 - 99 mg/dL   POCT GLUCOSE   Result Value Ref Range    POCT Glucose 106 (H) 74 - 99 mg/dL   POCT GLUCOSE   Result Value Ref Range    POCT Glucose 124 (H) 74 - 99 mg/dL   POCT GLUCOSE   Result Value Ref Range    POCT Glucose 121 (H) 74 - 99 mg/dL     IR CVC exchange    Result Date: 5/3/2024  Interpreted By:  Ronalod Dempsey, STUDY: IR CVC EXCHANGE; ;  5/3/2024 6:28 pm   FLUOROSCOPICALLY GUIDED EXCHANGE OF RIGHT GROIN TUNNELED HEMODIALYSIS CATHETER   INDICATION: Signs/Symptoms:Bacteremia- Right fem TDC exchange. 39-year-old man with end-stage renal disease, central thoracic venous occlusion, end-stage vascular access with bacteremia. Guidewire exchange of right groin tunneled hemodialysis catheter in an attempt to salvage the access.   COMPARISON: Chest radiograph 04/30/2024, fluoroscopic images from prior tunneled hemodialysis catheter placement 01/22/2024   ACCESSION NUMBER(S): [id number]   ORDERING CLINICIAN: SACHI MORTENSEN   TECHNIQUE:   ATTENDING : Ronaldo Dempsey M.D.   TECHNICAL DESCRIPTION/FINDINGS: The procedure, including all risks, benefits and alternatives were explained to the patient in detail. All questions were answered and written informed consent was obtained.   The patient was positioned supine on the angiography table. A time-out was performed.   The right groin was prepped and draped in usual sterile fashion. A  fluoroscopic image was obtained demonstrating the tunneled hemodialysis catheter terminating at the inferior cavoatrial junction.   1% lidocaine was administered via the subcutaneous tunnel tract for local anesthesia. Blunt dissection was  performed to free the subcutaneous catheter cuff. An 035 stiff Glidewire was then advanced through the catheter into the right atrium. The old catheter was removed. Over the guidewire, a new 14.5 Ukrainian by 42 cm tip to cuff dual-lumen hemodialysis catheter was then placed. A completion fluoroscopic image demonstrates the catheter in the inferior right atrium.   Catheter lumens easily aspirated and flushed and were locked with a concentrated heparinized solution (1000 units/cc). The catheter hub was then sutured to the skin with 2 0 Prolene stay suture. Because of losing along the tract, Gel-Foam pledgets were then administered subcutaneously near where the catheter enters the skin for additional hemostasis, followed by a pursestring suture. A sterile dressing was applied.   SEDATION/MEDICATIONS: Continuous cardiopulmonary monitoring was performed by a radiology nurse for the duration of the procedure.  1 mg Versed and   50 mcg Fentanyl were administered for moderate conscious sedation time of 20 minutes.      10 cc 1% Lidocaine was administered subcutaneously for local anesthesia. SPECIMENS: None. ESTIMATED BLOOD LOSS:  5 cc FLOUROSCOPY:  1.2 minutes; DAP  24449 mGy-cm*2; Air Kerma  59.07 mGy CONTRAST: None.   FINDINGS: Test       Fluoroscopically guidewire exchange of right groin tunneled hemodialysis catheter. The newly exchanged catheter is ready for immediate use.     MACRO: None   Signed by: Ronaldo Dempsey 5/3/2024 8:09 PM Dictation workstation:   [id number]               Assessment/Plan   This is a 39 year old male admitted for back pain and fever. Spine imaging relatively benign. WBC normal. Blood cultures + GPC. S/p right femoral THD exchange 5/3/24. Repeat BC were drawn and came back positive for Staph aureus on 5/7/2024. After discussion with the team and continued positive BC, it was felt that he needed a central line holiday. Right fem THD was removed 5/9/2024 and repeat cultures were drawn.  Cultures came back positive for staph aureus 5/13/24, potassium 5/9 yesterday on lab work. I placed another order for a repeat bmp but it has not been drawn. He has been receiving lokelma.       Patient has a history of illeocaval stenosis which may make access difficult. Does not tolerate left groin access long term as it interferes with his prosthesis. Plan to place a temporary HD catheter for dialysis today. Please keep NPO.     He will need a tunneled HD cath prior to discharge. Please consult IR once BC have cleared.     Risks were discussed including but not limited to pain at insertion site, infection, bleeding and hematoma, and air embolism. Benefits of the procedure and alternatives to treatment were also reviewed. Patient verbalizes understanding and wishes to proceed. They will further discuss with IR attending prior to procedure.      Planned Sedation/Anesthesia: Moderate    Airway assessment: normal    Mallampati: III (soft and hard palate and base of uvula visible)    ASA Score: ASA 3 - Patient with moderate systemic disease with functional limitations           I spent 35 minutes in the professional and overall care of this patient.      Henry Clemens, APRN-CNP

## 2024-05-13 NOTE — DISCHARGE INSTRUCTIONS
Central Venous Catheters (CVC)- Patient and Family Education    What is a Central Venous Catheter (CVC)?  A Central Venous Catheter (CVC) is a long tube used when you need special fluids or  medicines, blood samples for testing, or blood transfusions (like red blood cells or platelets). It  is made of a material that is soft and easy to bend. It may have 1, 2, or 3 tubes. Once the  catheter is put in, it stays in place. This means you should not need an IV placed in your arm for  fluids or medicines.    Before the Procedure:  ? If you take blood-thinning medications, you Must ask your doctor when you should stop  taking the medications. You may need to stop taking the blood-thinning medication up to  7 days prior to the exam.  ? You will have a blood sample drawn.  ? Do Not Drink or Eat Anything 8 hours before your procure unless instructed otherwise.  ? You may take any medications you normally take with small amount of clear liquid.    During the Procedure:  ? You will lie on a cushioned X-Ray table.  ? An Intravenous (IV) line will be placed in your arm.  ? You may be given medicine to help you relax during the test through the IV.  ? Part of your chest or arm will be washed with a germ killing solution to lessen the risk of  infection.  ? The area where the line is inserted will be numbed with medicine before your provider  inserts the line.  ? To place the CVC, two small skin cuts are made: one near the collarbone (insertion site)  and the other lower on your chest (exit site). A path is made under the skin between  these incisions. The catheter is then pulled through the path.  ? One end of the catheter is threaded into the large vein that leads to the heart. The other  end is outside your body and has a special cap. This is called the “injection port”.  Medications and blood products can be given through this port.  ? You will be awake during the test and your doctor will ask you to follow simple  commands.  You may ask questions during the test.    After the Procedure:  ? After you have returned to recovery area, your vital signs will be checked often.  ? You will stay in bed with your head slightly raised, for 1-2 hours.  ? Your procedure site will be checked for bleeding or swelling. Some bruising is normal.  If you see bleeding, apply pressure with your fingertips and call your nurse right away. If  you feel swelling or increased pain at the site, call your nurse.    Line Care:  ? The ends of the catheter are called ports and will be used many times. To lessen the risk  of infection the green protection capsports cap(s) will need to be changed at least once a  week or more frequently if needed.  ? At the exit site, there is a small cuff that lies under the skin. Your body will form a small  scar around this cuff to keep the catheter from moving and stitches will also hold the  catheter in place.  ? The site will be covered with a dressing and will need special care. Your nurse will teach you   or your family member how to change the  dressing. The incision at the insertion site will heal in a short period of time, up to 2  weeks.  ? Each time the catheter is used, it must be flushed out. This is done to keep it open and  free of blood clots. Some catheters require flushing with sterile saline while others use  both saline and heparin which is an “anticoagulant” to keep the blood inside the catheter  from clotting.  ? Remember to cover the dressing over the catheter and skin entry site when showering.  Avoid situations where water may sit on the entry site for prolonged periods (pools,  baths, Jacuzzis, etc.).    Follow these Guidelines for a Safer Recovery:    Activity:  ? Rest by sitting up for 4 - 6 hours.  ? Limit activity for 24 hours after the procedure.  ? No driving for 24 hours.  ? Do not do any heavy lifting, over 10 pounds, for 48 hours.  ? Avoid intense exercise and contact sports for 48  hours.    Diet:  ? You may resume your normal diet.    Medicines:  ? If you take blood thinning medications, ask your doctor when you can take these again.  ? You may take your other medicines as ordered by your doctor.  ? Bleeding from the procedure site.    If you go home after the procedure:  ? You must have someone drive you home after the test. You will not be allowed to drive  or travel home alone in a cab or on a bus. You should not drive for 24 hours after the  test.  ? Someone should stay with you through the night.  ? Resume your regular diet and medicines.  ? Rest until morning and avoid strenuous activities for the next 2 days.  ? Avoid getting the site wet if it is accessed with a needle or not completely healed after the  procedure.    Call Your Doctor Right Away:  ? Bleeding from the procedure site.  ? Shortness of breath or trouble breathing.  ? Increase in pain at the site.  ? Dizziness or fainting.  ? Shortness of breath or trouble breathing.  ? Increased pain at the procedure site.  ? Dizziness or fainting,  If you are not able to contact your doctor, call 911 and go to the nearest Emergency Room.    Also, Call your Doctor:  ? If the end caps come off. Be sure the line is clamped.  ? If there is any bleeding around the tube.  ? If there is any signs of infection including:  o Redness, swelling or pus-like drainage at the biopsy site.  o Night sweats.  o Pain or tenderness at the procedure site.  o Fever of 100.4 degrees F or higher.  o Shaking or chills.  ? If the tube is damaged.  ? If you develop swelling in the arm, chest or neck on the tube side.  ? If the tube comes out.  o Do not try to push it back in. Apply pressure for 10 minutes where the tube came  out with a clean gauze or clean towel. If the bleeding does not stop, continue to  apply pressure and have someone take you to the nearest emergency room. If you  have trouble, call 911 and lie on your left side with your head down.  ? If you  have any questions.     How to Reach your Doctor:    Call [phone] with problems or questions    This info is a general resource. It is not meant to replace your health care provider’s advice.  Ask your doctor or health care team any questions. Always follow their instructions.

## 2024-05-13 NOTE — NURSING NOTE
Pt refused lantus tonight. Education provided, snacks and beverages offered but declined. Dr. Fortune/Endocrinology made aware at this time. Dr. Santana also made aware, messages are currently being forwarded to Dr. Fortune. No new orders at this time

## 2024-05-13 NOTE — CARE PLAN
The patient's goals for the shift include      The clinical goals for the shift include pt will maintain comfort and safety this shift    Problem: Pain  Goal: My pain/discomfort is manageable  Outcome: Progressing     Problem: Safety  Goal: Patient will be injury free during hospitalization  Outcome: Progressing  Goal: I will remain free of falls  Outcome: Progressing     Problem: Daily Care  Goal: Daily care needs are met  Outcome: Progressing     Problem: Psychosocial Needs  Goal: Demonstrates ability to cope with hospitalization/illness  Outcome: Progressing  Goal: Collaborate with me, my family, and caregiver to identify my specific goals  Outcome: Progressing     Problem: Discharge Barriers  Goal: My discharge needs are met  Outcome: Progressing     Problem: Skin  Goal: Decreased wound size/increased tissue granulation at next dressing change  Outcome: Progressing  Goal: Participates in plan/prevention/treatment measures  Outcome: Progressing  Goal: Prevent/manage excess moisture  Outcome: Progressing  Goal: Prevent/minimize sheer/friction injuries  Outcome: Progressing  Goal: Promote/optimize nutrition  Outcome: Progressing  Goal: Promote skin healing  Outcome: Progressing     Problem: Fall/Injury  Goal: Not fall by end of shift  Outcome: Progressing  Goal: Be free from injury by end of the shift  Outcome: Progressing  Goal: Verbalize understanding of personal risk factors for fall in the hospital  Outcome: Progressing  Goal: Verbalize understanding of risk factor reduction measures to prevent injury from fall in the home  Outcome: Progressing  Goal: Use assistive devices by end of the shift  Outcome: Progressing  Goal: Pace activities to prevent fatigue by end of the shift  Outcome: Progressing     Problem: Pain - Adult  Goal: Verbalizes/displays adequate comfort level or baseline comfort level  Outcome: Progressing     Problem: Safety - Adult  Goal: Free from fall injury  Outcome: Progressing     Problem:  Discharge Planning  Goal: Discharge to home or other facility with appropriate resources  Outcome: Progressing     Problem: Chronic Conditions and Co-morbidities  Goal: Patient's chronic conditions and co-morbidity symptoms are monitored and maintained or improved  Outcome: Progressing     Problem: Diabetes  Goal: Achieve decreasing blood glucose levels by end of shift  Outcome: Progressing  Goal: Increase stability of blood glucose readings by end of shift  Outcome: Progressing  Goal: Decrease in ketones present in urine by end of shift  Outcome: Progressing  Goal: Maintain electrolyte levels within acceptable range throughout shift  Outcome: Progressing  Goal: Maintain glucose levels >70mg/dl to <250mg/dl throughout shift  Outcome: Progressing  Goal: No changes in neurological exam by end of shift  Outcome: Progressing  Goal: Learn about and adhere to nutrition recommendations by end of shift  Outcome: Progressing  Goal: Vital signs within normal range for age by end of shift  Outcome: Progressing  Goal: Increase self care and/or family involovement by end of shift  Outcome: Progressing  Goal: Receive DSME education by end of shift  Outcome: Progressing

## 2024-05-13 NOTE — PROGRESS NOTES
.Report from Sending RN:    Report From: Garima Jung  Recent Surgery of Procedure: Yes/ Right groin fem catheter placement  Baseline Level of Consciousness (LOC): A/O X3  Oxygen Use: No  Type: Room air  Diabetic: Yes  Last BP Med Given Day of Dialysis: See Emar  Last Pain Med Given: See Emar  Lab Tests to be Obtained with Dialysis: No  Blood Transfusion to be Given During Dialysis: No  Available IV Access: yes  Medications to be Administered During Dialysis: Yes  Continuous IV Infusion Running: No  Restraints on Currently or in the Last 24 Hours: No  Hand-Off Communication: Pt is  A/o x's 3 . right fem catheter placement done prior to treatment. Ok  to use in doctor's notes  Dialysis Catheter Dressing:  placed with catheter placement   Last Dressing Change: 5/13/2024 changed prior to start of treatment by Technician Lynn Lockett

## 2024-05-14 ENCOUNTER — APPOINTMENT (OUTPATIENT)
Dept: CARDIOLOGY | Facility: HOSPITAL | Age: 39
DRG: 314 | End: 2024-05-14
Payer: COMMERCIAL

## 2024-05-14 ENCOUNTER — ANESTHESIA (OUTPATIENT)
Dept: CARDIOLOGY | Facility: HOSPITAL | Age: 39
DRG: 314 | End: 2024-05-14
Payer: COMMERCIAL

## 2024-05-14 ENCOUNTER — ANESTHESIA EVENT (OUTPATIENT)
Dept: CARDIOLOGY | Facility: HOSPITAL | Age: 39
DRG: 314 | End: 2024-05-14
Payer: COMMERCIAL

## 2024-05-14 ENCOUNTER — APPOINTMENT (OUTPATIENT)
Dept: VASCULAR MEDICINE | Facility: CLINIC | Age: 39
End: 2024-05-14
Payer: COMMERCIAL

## 2024-05-14 LAB
ALBUMIN SERPL BCP-MCNC: 2.8 G/DL (ref 3.4–5)
ANION GAP SERPL CALC-SCNC: 23 MMOL/L (ref 10–20)
BACTERIA BLD AEROBE CULT: ABNORMAL
BACTERIA BLD AEROBE CULT: ABNORMAL
BACTERIA BLD CULT: ABNORMAL
BACTERIA BLD CULT: ABNORMAL
BUN SERPL-MCNC: 22 MG/DL (ref 6–23)
CALCIUM SERPL-MCNC: 8.5 MG/DL (ref 8.6–10.3)
CHLORIDE SERPL-SCNC: 94 MMOL/L (ref 98–107)
CO2 SERPL-SCNC: 21 MMOL/L (ref 21–32)
CREAT SERPL-MCNC: 4.47 MG/DL (ref 0.5–1.3)
EGFRCR SERPLBLD CKD-EPI 2021: 16 ML/MIN/1.73M*2
ERYTHROCYTE [DISTWIDTH] IN BLOOD BY AUTOMATED COUNT: 18.2 % (ref 11.5–14.5)
GLUCOSE BLD MANUAL STRIP-MCNC: 209 MG/DL (ref 74–99)
GLUCOSE BLD MANUAL STRIP-MCNC: 218 MG/DL (ref 74–99)
GLUCOSE BLD MANUAL STRIP-MCNC: 225 MG/DL (ref 74–99)
GLUCOSE BLD MANUAL STRIP-MCNC: 241 MG/DL (ref 74–99)
GLUCOSE SERPL-MCNC: 176 MG/DL (ref 74–99)
GRAM STN SPEC: ABNORMAL
HCT VFR BLD AUTO: 25.4 % (ref 41–52)
HGB BLD-MCNC: 7.8 G/DL (ref 13.5–17.5)
MCH RBC QN AUTO: 28.3 PG (ref 26–34)
MCHC RBC AUTO-ENTMCNC: 30.7 G/DL (ref 32–36)
MCV RBC AUTO: 92 FL (ref 80–100)
NRBC BLD-RTO: 0 /100 WBCS (ref 0–0)
PHOSPHATE SERPL-MCNC: 4.4 MG/DL (ref 2.5–4.9)
PLATELET # BLD AUTO: 338 X10*3/UL (ref 150–450)
POTASSIUM SERPL-SCNC: 4.1 MMOL/L (ref 3.5–5.3)
RBC # BLD AUTO: 2.76 X10*6/UL (ref 4.5–5.9)
SODIUM SERPL-SCNC: 134 MMOL/L (ref 136–145)
VANCOMYCIN SERPL-MCNC: 24 UG/ML (ref 5–20)
WBC # BLD AUTO: 7.7 X10*3/UL (ref 4.4–11.3)

## 2024-05-14 PROCEDURE — 87040 BLOOD CULTURE FOR BACTERIA: CPT | Mod: AHULAB | Performed by: INTERNAL MEDICINE

## 2024-05-14 PROCEDURE — 82947 ASSAY GLUCOSE BLOOD QUANT: CPT | Mod: 91

## 2024-05-14 PROCEDURE — 80069 RENAL FUNCTION PANEL: CPT | Performed by: INTERNAL MEDICINE

## 2024-05-14 PROCEDURE — 1200000002 HC GENERAL ROOM WITH TELEMETRY DAILY

## 2024-05-14 PROCEDURE — 2500000004 HC RX 250 GENERAL PHARMACY W/ HCPCS (ALT 636 FOR OP/ED)

## 2024-05-14 PROCEDURE — 3700000001 HC GENERAL ANESTHESIA TIME - INITIAL BASE CHARGE

## 2024-05-14 PROCEDURE — 93320 DOPPLER ECHO COMPLETE: CPT

## 2024-05-14 PROCEDURE — 2500000002 HC RX 250 W HCPCS SELF ADMINISTERED DRUGS (ALT 637 FOR MEDICARE OP, ALT 636 FOR OP/ED): Performed by: INTERNAL MEDICINE

## 2024-05-14 PROCEDURE — 3700000002 HC GENERAL ANESTHESIA TIME - EACH INCREMENTAL 1 MINUTE

## 2024-05-14 PROCEDURE — 99231 SBSQ HOSP IP/OBS SF/LOW 25: CPT | Performed by: INTERNAL MEDICINE

## 2024-05-14 PROCEDURE — 2500000004 HC RX 250 GENERAL PHARMACY W/ HCPCS (ALT 636 FOR OP/ED): Performed by: NURSE ANESTHETIST, CERTIFIED REGISTERED

## 2024-05-14 PROCEDURE — C9113 INJ PANTOPRAZOLE SODIUM, VIA: HCPCS

## 2024-05-14 PROCEDURE — 2500000001 HC RX 250 WO HCPCS SELF ADMINISTERED DRUGS (ALT 637 FOR MEDICARE OP): Performed by: NURSE PRACTITIONER

## 2024-05-14 PROCEDURE — 36415 COLL VENOUS BLD VENIPUNCTURE: CPT | Performed by: INTERNAL MEDICINE

## 2024-05-14 PROCEDURE — 2500000004 HC RX 250 GENERAL PHARMACY W/ HCPCS (ALT 636 FOR OP/ED): Performed by: NURSE PRACTITIONER

## 2024-05-14 PROCEDURE — 93312 ECHO TRANSESOPHAGEAL: CPT | Performed by: INTERNAL MEDICINE

## 2024-05-14 PROCEDURE — 2500000001 HC RX 250 WO HCPCS SELF ADMINISTERED DRUGS (ALT 637 FOR MEDICARE OP): Performed by: FAMILY MEDICINE

## 2024-05-14 PROCEDURE — B24BZZ4 ULTRASONOGRAPHY OF HEART WITH AORTA, TRANSESOPHAGEAL: ICD-10-PCS | Performed by: INTERNAL MEDICINE

## 2024-05-14 PROCEDURE — 2500000005 HC RX 250 GENERAL PHARMACY W/O HCPCS: Performed by: NURSE PRACTITIONER

## 2024-05-14 PROCEDURE — 85027 COMPLETE CBC AUTOMATED: CPT | Performed by: INTERNAL MEDICINE

## 2024-05-14 PROCEDURE — 2500000006 HC RX 250 W HCPCS SELF ADMINISTERED DRUGS (ALT 637 FOR ALL PAYERS): Performed by: NURSE PRACTITIONER

## 2024-05-14 PROCEDURE — 80202 ASSAY OF VANCOMYCIN: CPT | Performed by: PHARMACIST

## 2024-05-14 PROCEDURE — 2500000005 HC RX 250 GENERAL PHARMACY W/O HCPCS: Performed by: NURSE ANESTHETIST, CERTIFIED REGISTERED

## 2024-05-14 RX ORDER — PHENYLEPHRINE HCL IN 0.9% NACL 1 MG/10 ML
SYRINGE (ML) INTRAVENOUS AS NEEDED
Status: DISCONTINUED | OUTPATIENT
Start: 2024-05-14 | End: 2024-05-14

## 2024-05-14 RX ORDER — MIDAZOLAM HYDROCHLORIDE 1 MG/ML
INJECTION INTRAMUSCULAR; INTRAVENOUS AS NEEDED
Status: DISCONTINUED | OUTPATIENT
Start: 2024-05-14 | End: 2024-05-14

## 2024-05-14 RX ORDER — PROPOFOL 10 MG/ML
INJECTION, EMULSION INTRAVENOUS AS NEEDED
Status: DISCONTINUED | OUTPATIENT
Start: 2024-05-14 | End: 2024-05-14

## 2024-05-14 RX ORDER — LIDOCAINE HYDROCHLORIDE 20 MG/ML
INJECTION, SOLUTION EPIDURAL; INFILTRATION; INTRACAUDAL; PERINEURAL AS NEEDED
Status: DISCONTINUED | OUTPATIENT
Start: 2024-05-14 | End: 2024-05-14

## 2024-05-14 RX ORDER — LIDOCAINE HYDROCHLORIDE 20 MG/ML
15 SOLUTION OROPHARYNGEAL ONCE
Status: COMPLETED | OUTPATIENT
Start: 2024-05-14 | End: 2024-05-14

## 2024-05-14 RX ORDER — SODIUM CHLORIDE 9 MG/ML
100 INJECTION, SOLUTION INTRAVENOUS CONTINUOUS
Status: DISCONTINUED | OUTPATIENT
Start: 2024-05-14 | End: 2024-05-18

## 2024-05-14 RX ADMIN — MIDAZOLAM HYDROCHLORIDE 2 MG: 1 INJECTION, SOLUTION INTRAMUSCULAR; INTRAVENOUS at 13:41

## 2024-05-14 RX ADMIN — ATORVASTATIN CALCIUM 40 MG: 40 TABLET, FILM COATED ORAL at 22:15

## 2024-05-14 RX ADMIN — CYCLOBENZAPRINE HYDROCHLORIDE 5 MG: 5 TABLET, FILM COATED ORAL at 22:15

## 2024-05-14 RX ADMIN — PROPOFOL 20 MG: 10 INJECTION, EMULSION INTRAVENOUS at 13:48

## 2024-05-14 RX ADMIN — PROPOFOL 20 MG: 10 INJECTION, EMULSION INTRAVENOUS at 13:51

## 2024-05-14 RX ADMIN — SEVELAMER CARBONATE 2400 MG: 800 TABLET, FILM COATED ORAL at 17:18

## 2024-05-14 RX ADMIN — LIDOCAINE HYDROCHLORIDE 40 MG: 20 INJECTION, SOLUTION EPIDURAL; INFILTRATION; INTRACAUDAL; PERINEURAL at 13:48

## 2024-05-14 RX ADMIN — NEPHROCAP 1 CAPSULE: 1 CAP ORAL at 09:19

## 2024-05-14 RX ADMIN — LIDOCAINE HYDROCHLORIDE 60 MG: 20 INJECTION, SOLUTION EPIDURAL; INFILTRATION; INTRACAUDAL; PERINEURAL at 13:43

## 2024-05-14 RX ADMIN — INSULIN GLARGINE 2 UNITS: 100 INJECTION, SOLUTION SUBCUTANEOUS at 22:18

## 2024-05-14 RX ADMIN — PANTOPRAZOLE SODIUM 40 MG: 40 INJECTION, POWDER, FOR SOLUTION INTRAVENOUS at 22:14

## 2024-05-14 RX ADMIN — BENZOCAINE, BUTAMBEN, AND TETRACAINE HYDROCHLORIDE 2 SPRAY: .028; .004; .004 AEROSOL, SPRAY TOPICAL at 13:40

## 2024-05-14 RX ADMIN — OXYCODONE HYDROCHLORIDE 5 MG: 5 TABLET ORAL at 17:17

## 2024-05-14 RX ADMIN — PANTOPRAZOLE SODIUM 40 MG: 40 INJECTION, POWDER, FOR SOLUTION INTRAVENOUS at 09:47

## 2024-05-14 RX ADMIN — CYCLOBENZAPRINE HYDROCHLORIDE 5 MG: 5 TABLET, FILM COATED ORAL at 09:47

## 2024-05-14 RX ADMIN — PROPOFOL 60 MG: 10 INJECTION, EMULSION INTRAVENOUS at 13:43

## 2024-05-14 RX ADMIN — OXYCODONE HYDROCHLORIDE 5 MG: 5 TABLET ORAL at 22:15

## 2024-05-14 RX ADMIN — SODIUM CHLORIDE: 9 INJECTION, SOLUTION INTRAVENOUS at 13:15

## 2024-05-14 RX ADMIN — SODIUM CHLORIDE: 9 INJECTION, SOLUTION INTRAVENOUS at 13:35

## 2024-05-14 RX ADMIN — OXYCODONE HYDROCHLORIDE 5 MG: 5 TABLET ORAL at 09:47

## 2024-05-14 RX ADMIN — LEVOTHYROXINE SODIUM 125 MCG: 125 TABLET ORAL at 06:56

## 2024-05-14 RX ADMIN — LIDOCAINE HYDROCHLORIDE 15 ML: 20 SOLUTION ORAL at 13:40

## 2024-05-14 RX ADMIN — Medication 100 MCG: at 13:56

## 2024-05-14 SDOH — HEALTH STABILITY: MENTAL HEALTH: CURRENT SMOKER: 1

## 2024-05-14 ASSESSMENT — COGNITIVE AND FUNCTIONAL STATUS - GENERAL
CLIMB 3 TO 5 STEPS WITH RAILING: TOTAL
MOBILITY SCORE: 11
MOBILITY SCORE: 13
TOILETING: A LOT
MOVING TO AND FROM BED TO CHAIR: A LOT
TURNING FROM BACK TO SIDE WHILE IN FLAT BAD: A LITTLE
PERSONAL GROOMING: A LITTLE
TURNING FROM BACK TO SIDE WHILE IN FLAT BAD: A LITTLE
WALKING IN HOSPITAL ROOM: TOTAL
CLIMB 3 TO 5 STEPS WITH RAILING: TOTAL
WALKING IN HOSPITAL ROOM: TOTAL
EATING MEALS: A LITTLE
DAILY ACTIVITIY SCORE: 16
STANDING UP FROM CHAIR USING ARMS: TOTAL
HELP NEEDED FOR BATHING: TOTAL
MOVING TO AND FROM BED TO CHAIR: A LITTLE
DRESSING REGULAR LOWER BODY CLOTHING: TOTAL
TOILETING: A LITTLE
PERSONAL GROOMING: A LOT
STANDING UP FROM CHAIR USING ARMS: TOTAL
DRESSING REGULAR UPPER BODY CLOTHING: A LITTLE
DRESSING REGULAR LOWER BODY CLOTHING: A LOT
DRESSING REGULAR UPPER BODY CLOTHING: A LITTLE
HELP NEEDED FOR BATHING: A LOT
DAILY ACTIVITIY SCORE: 13
MOVING FROM LYING ON BACK TO SITTING ON SIDE OF FLAT BED WITH BEDRAILS: A LITTLE

## 2024-05-14 ASSESSMENT — PAIN - FUNCTIONAL ASSESSMENT
PAIN_FUNCTIONAL_ASSESSMENT: 0-10

## 2024-05-14 ASSESSMENT — PAIN DESCRIPTION - LOCATION
LOCATION: BACK
LOCATION: BACK
LOCATION: LEG

## 2024-05-14 ASSESSMENT — PAIN DESCRIPTION - ORIENTATION: ORIENTATION: RIGHT

## 2024-05-14 ASSESSMENT — PAIN SCALES - GENERAL
PAINLEVEL_OUTOF10: 6
PAINLEVEL_OUTOF10: 8
PAINLEVEL_OUTOF10: 0 - NO PAIN
PAINLEVEL_OUTOF10: 0 - NO PAIN
PAINLEVEL_OUTOF10: 7
PAINLEVEL_OUTOF10: 4
PAINLEVEL_OUTOF10: 5 - MODERATE PAIN
PAINLEVEL_OUTOF10: 7
PAINLEVEL_OUTOF10: 0 - NO PAIN

## 2024-05-14 ASSESSMENT — PAIN DESCRIPTION - DESCRIPTORS
DESCRIPTORS: ACHING
DESCRIPTORS: ACHING

## 2024-05-14 ASSESSMENT — PAIN SCALES - PAIN ASSESSMENT IN ADVANCED DEMENTIA (PAINAD): TOTALSCORE: MEDICATION (SEE MAR)

## 2024-05-14 NOTE — CONSULTS
"Nutrition Assessment Note  Nutrition Assessment      Reason for Assessment  Reason for Assessment: Length of stay  Hospital day #14 for SIRS.  MST score of 0 on admit.  Chart reviewed and met with patient.  Poor intake noted during admit.  Patient states he doesn't care for food here and food is often cold when it arrives.  He is on disposable service 2/2 infections, which doesn't insulate food well.  Refused having it reheated on the floor.  Offered cold food options, but he is not interested at this time. Defers Nepro shakes, noting they upset his stomach.  Full assessment completed 2/2 significant weight loss.    History:  Food and Nutrient History  Energy Intake: Poor < 50 %  Food and Nutrient History: Reports he isn't eating meals here 'because they aren't fresh and are cold'. Denies poor appetite, just doesn't like hospital food.  Resides at [facility] where meals are provided.     Patient Active Problem List  Diagnosis   ESRD (end stage renal disease) on dialysis (Multi)   Hypertension   Itch   Pain   Trigger middle finger of left hand   Type 1 diabetes mellitus (Multi)   Vitamin D deficiency   PVD (peripheral vascular disease) (CMS-[facility])   DVT prophylaxis   Overweight with body mass index (BMI) 25.0-29.9   Hypothyroidism   HLD (hyperlipidemia)   GERD (gastroesophageal reflux disease)   Folic acid deficiency   Elevated alkaline phosphatase level   Cellulitis and abscess of left leg   Septic shock (Multi)   SIRS (systemic inflammatory response syndrome) (Multi)  RUE amputation  L AKA, poor healing    Anthropometrics:  Height: 175.3 cm (5' 9.02\")  Weight: 72.9 kg (160 lb 12.8 oz)  BMI (Calculated): 23.74  Weight Change  Weight History / % Weight Change: 11/10/23: 79.4kg.  9/21/23: 92 kg.  21% loss over 8 months.  Some may be related to HD treatments.  Significant Weight Loss: Yes  IBW/kg (Dietitian Calculated): 72.7 kg     Amputation Calculations:  BMI Amputation Adjustment: Yes  Percent Amputation: Upper arm, " "Thigh  Total Amputation Percentage: 12.8  Adjusted IBW/k.7  Adjusted BMI: 27.2    Energy Needs:  Calculated Energy Needs Using Equations  Height: 175.3 cm (5' 9.02\")  Temp: 36.9 °C (98.4 °F)    Estimated Energy Needs  Method for Estimating Needs: 9988-8393 @ 30-35 kcal/kg IBW    Estimated Protein Needs  Method for Estimating Needs:  @ 1.5-1.7 gr/kg IBW    Estimated Fluid Needs  Method for Estimating Needs: per MD     Dietary Orders   Adult diet Regular, Carb Controlled; 75 gram carb/meal, 45 gram Carb evening snack       Nutrition Focused Physical Findings:  Subcutaneous Fat Loss  Orbital Fat Pads: Well nourished (slightly bulging fat pads)  Buccal Fat Pads: Well nourished (full, rounded cheeks)    Muscle Wasting  Temporalis: Well nourished (well-defined muscle)  Pectoralis (Clavicular Region): Well nourished (clavicle not visible)  Interosseous: Well nourished (muscle bulges)    Edema  Edema: +2 mild  Edema Location: RLE    Physical Findings (Nutrition Deficiency/Toxicity)  Skin: Positive (L stump surgical site,)     Results   POCT GLUCOSE  Result Value Ref Range   POCT Glucose 104 (H) 74 - 99 mg/dL  Vancomycin  Result Value Ref Range   Vancomycin 24.0 (H) 5.0 - 20.0 ug/mL  CBC  Result Value Ref Range   WBC 7.7 4.4 - 11.3 x10*3/uL   nRBC 0.0 0.0 - 0.0 /100 WBCs   RBC 2.76 (L) 4.50 - 5.90 x10*6/uL   Hemoglobin 7.8 (L) 13.5 - 17.5 g/dL   Hematocrit 25.4 (L) 41.0 - 52.0 %   MCV 92 80 - 100 fL   MCH 28.3 26.0 - 34.0 pg   MCHC 30.7 (L) 32.0 - 36.0 g/dL   RDW 18.2 (H) 11.5 - 14.5 %   Platelets 338 150 - 450 x10*3/uL  Renal Function Panel  Result Value Ref Range   Glucose 176 (H) 74 - 99 mg/dL   Sodium 134 (L) 136 - 145 mmol/L   Potassium 4.1 3.5 - 5.3 mmol/L   Chloride 94 (L) 98 - 107 mmol/L   Bicarbonate 21 21 - 32 mmol/L   Anion Gap 23 (H) 10 - 20 mmol/L   Urea Nitrogen 22 6 - 23 mg/dL   Creatinine 4.47 (H) 0.50 - 1.30 mg/dL   eGFR 16 (L) >60 mL/min/1.73m*2   Calcium 8.5 (L) 8.6 - 10.3 " mg/dL   Phosphorus 4.4 2.5 - 4.9 mg/dL   Albumin 2.8 (L) 3.4 - 5.0 g/dL   POCT GLUCOSE  Result Value Ref Range   POCT Glucose 209 (H) 74 - 99 mg/dL  POCT GLUCOSE  Result Value Ref Range   POCT Glucose 225 (H) 74 - 99 mg/dL  POCT GLUCOSE  Result Value Ref Range   POCT Glucose 218 (H) 74 - 99 mg/dL     Nutrition Diagnosis   Malnutrition Diagnosis  Patient has Malnutrition Diagnosis: Yes  Diagnosis Status: New  Malnutrition Diagnosis: Severe malnutrition related to chronic disease or condition  As Evidenced by: 18% weight loss over 8 months, intake <75% meals for > 1 month.    Patient has Nutrition Diagnosis: Yes  Nutrition Diagnosis 1: Inadequate protein energy intake  Diagnosis Status (1): New  Related to (1): increased energy demands  As Evidenced by (1): intake <50% of meals.     Scheduled medications  [Held by provider] apixaban, 5 mg, oral, BID  atorvastatin, 40 mg, oral, Nightly  B complex-vitamin C-folic acid, 1 capsule, oral, Daily  epoetin tyson or biosimilar, 100 Units/kg, subcutaneous, Every Mon/Wed/Fri  heparin, 3,100 Units, intra-catheter, After Dialysis  heparin, 3,100 Units, intra-catheter, After Dialysis  insulin glargine, 2 Units, subcutaneous, Nightly  insulin lispro, 0-5 Units, subcutaneous, TID  levothyroxine, 125 mcg, oral, Daily before breakfast  pantoprazole, 40 mg, intravenous, BID  polyethylene glycol, 17 g, oral, Daily  sennosides, 8.6 mg, oral, BID  sevelamer carbonate, 2,400 mg, oral, TID  sodium zirconium cyclosilicate, 10 g, oral, Daily    Continuous medications  oxygen, , Last Rate: 2 L/min (05/03/24 8408)  sodium chloride 0.9%, 100 mL/hr, Last Rate: Stopped (05/14/24 1403)    PRN medications  PRN medications: cyclobenzaprine, dextrose, dextrose, fentaNYL PF, glucagon, heparin, lidocaine, lidocaine PF, loperamide, midazolam, nitroglycerin, ondansetron, oxyCODONE, oxygen, oxygen, oxygen, simethicone, vancomycin     Nutrition Interventions/Recommendations   Nutrition  Prescription  Individualized Nutrition Prescription Provided for : Continue diet as ordered, moniotr RFP and add mineral restrictions as appropriate.  Patient defers nutrition supplements at this time.  Daily renal MVI.  Marc BID.    Food and/or Nutrient Delivery Interventions  Meals and Snacks: Carbohydrate-modified diet  Goal: intake meets >75% estimated nutrient needs.      Nutrition Monitoring and Evaluation   Food and Nutrient Related History   Amount of Food: Estimated amout of food  Criteria: intake >75% of meals    Anthropometrics: Body Composition/Growth/Weight History  Weight: Measured weight  Criteria: s/p dialysis  Weight Change: Weight change percentage  Criteria: monthly    Biochemical Data, Medical Tests and Procedures  Electrolyte and Renal Panel: BUN, Calcium, serum, Chloride, Creatinine, Magnesium, Phosphorus, Potassium, Sodium  Criteria: as indicated  Glucose/Endocrine Profile: Glucose, casual, Hemoglobin A1c (HgbA1c), Glucose, fasting  Criteria: as indicated  Nutritional Anemia Profile: Folate, serum, Hematocrit, Hemoglobin, Iron, serum  Criteria: as indicated  Vitamin Profile: Vitamin D, 25 hydroxy, Other (Comment)  Criteria: as indicated    Nutrition Focused Physical Findings   Digestive System: Anorexia, Constipation, Diarrhea, Early satiety, Nausea, Vomiting, Decrease in appetite  Criteria: daily    Skin: Impaired wound healing      Follow Up  Last Date of Nutrition Visit: 05/14/24  Nutrition Follow-Up Needed?: Dietitian to reassess per policy  Follow up Comment: LOS, poor PO-TR

## 2024-05-14 NOTE — PROGRESS NOTES
"Edwar Hemphill is a 39 y.o. male on day 14 of admission presenting with SIRS (systemic inflammatory response syndrome) (Multi).      Subjective   Patient was seen today, examined and evaluated.  No new complaints overnight, he got dialysis yesterday via temporary right groin hemodialysis catheter that was placed in IR.       Objective        Vitals 24HR  Heart Rate:  []   Temp:  [36.3 °C (97.4 °F)-37.2 °C (99 °F)]   Resp:  [14-20]   BP: (101-158)/()   Height:  [175.3 cm (5' 9.02\")]   Weight:  [72.9 kg (160 lb 12.8 oz)]   SpO2:  [92 %-98 %]     Intake/Output last 3 Shifts:    Intake/Output Summary (Last 24 hours) at 5/14/2024 1102  Last data filed at 5/14/2024 0916  Gross per 24 hour   Intake 1200 ml   Output 1400 ml   Net -200 ml       Physical Exam    GEN appearance; awake and alert no acute distress  Head and ENT; normocephalic/atraumatic/PERRL neck/JVD  Lungs: CTA  Heart; RRR  Abdomen; soft no tenderness  Extremities; right upper extremity amputation, left AKA  Neurologic; physiologic      Dialysis access= right groin temporary hemodialysis catheter.              Relevant Results     Results from last 7 days   Lab Units 05/14/24  0625 05/11/24  0631 05/10/24  0527   WBC AUTO x10*3/uL 7.7 7.0 7.3   HEMOGLOBIN g/dL 7.8* 8.2* 7.7*   HEMATOCRIT % 25.4* 27.1* 23.8*   PLATELETS AUTO x10*3/uL 338 304 236      Results from last 7 days   Lab Units 05/14/24  0625 05/12/24  0710 05/11/24  0631   SODIUM mmol/L 134* 130* 132*   POTASSIUM mmol/L 4.1 5.9* 5.0   CHLORIDE mmol/L 94* 94* 94*   CO2 mmol/L 21 26 26   BUN mg/dL 22 53* 51*   CREATININE mg/dL 4.47* 7.61* 6.99*   GLUCOSE mg/dL 176* 164* 122*   CALCIUM mg/dL 8.5* 9.2 9.6   IR CVC tunneled    Result Date: 5/13/2024  Interpreted By:  Ronaldo Dempsey, STUDY: IR CVC TUNNELED; ;  5/13/2024 3:26 pm   ULTRASOUND FLUOROSCOPICALLY GUIDED RIGHT GROIN TEMPORARY HEMODIALYSIS CATHETER PLACEMENT   INDICATION: Signs/Symptoms:Temporary dialysis line today. 39-year-old man " with end-stage renal disease, persistent bacteremia despite removal of groin tunneled hemodialysis catheter. Requires replacement of hemodialysis access.   COMPARISON: Fluoroscopic images from prior tunneled hemodialysis catheter exchange 05/13/2024   ACCESSION NUMBER(S): [id number]   ORDERING CLINICIAN: BRADEN QUIROGA   TECHNIQUE:   ATTENDING : Ronaldo Dempsey M.D.   TECHNICAL DESCRIPTION/FINDINGS: The procedure, including all risks, benefits and alternatives were explained to the patient in detail. All questions were answered and written informed consent was obtained.   Patient was positioned supine on the fluoroscopy table. A time-out was performed.   The bilateral groins were prepped and draped in usual sterile fashion. Focused ultrasound was performed over the superior aspect of the right common femoral vein demonstrating superior patency with partial compressibility. Ultrasound images were saved. 1% lidocaine was administered subcutaneously for local anesthesia. Under real-time ultrasound guidance, the right common femoral vein was accessed in antegrade direction using micropuncture technique. Ultrasound images were saved. Under fluoroscopic visualization, an 018 guidewire was advanced into the right common iliac vein and micropuncture transitional introducer was utilized for placement of an 035 guidewire into the IVC. After serial dilation, a 13 Belarusian by 20 cm Trialysis catheter was then placed. A completion fluoroscopic image was obtained demonstrating the tip of the temporary hemodialysis catheter projecting over the IV C.   Catheter lumens easily aspirated and flushed. Large-bore dialysis lumens were locked with a concentrated heparinized solution (1000 units/cc). A small bore 3rd lumen was locked with a dilute heparinized solution. The catheter hub was sutured to the skin with 2 0 Prolene stay suture. Sterile dressing was applied.   SEDATION/MEDICATIONS: Continuous cardiopulmonary monitoring  was performed by a radiology nurse for the duration of the procedure. 0.5 mg Versed and   25 mcg Fentanyl were administered for moderate conscious sedation time of 20 minutes.      10 cc 1% Lidocaine was administered subcutaneously for local anesthesia. SPECIMENS: None. ESTIMATED BLOOD LOSS:  5 cc FLOUROSCOPY:  0.1 minutes; DAP  2405 mGy-cm*2; Air Kerma  6.58 mGy CONTRAST: None.   FINDINGS: Test       Ultrasound fluoroscopically guided right groin temporary hemodialysis catheter. The catheter is ready for immediate use.     MACRO: None   Signed by: Ronaldo Dempsey 5/13/2024 4:59 PM Dictation workstation:   [id number]    MR lumbar spine wo IV contrast    Result Date: 5/4/2024  Interpreted By:  Eva Og and Muddasani Dheeraj STUDY: MR LUMBAR SPINE WO IV CONTRAST   INDICATION: Signs/Symptoms:back pain new with bacteremia, possible infection   COMPARISON: CT of the lumbar spine 04/30/2024. Body CT 01/14/2024.   ACCESSION NUMBER(S): [id number]   ORDERING CLINICIAN: ALKA VIEYRA   TECHNIQUE: Sagittal T1, sagittal T2, sagittal STIR, axial T1 and axial T2 weighted MRI images of the lumbar spine were obtained without intravenous contrast administration.   FINDINGS: Image quality is somewhat degraded due to motion and technique.   There are 5 non-rib-bearing lumbar-type vertebral bodies. The last well-formed intervertebral disc is labeled L5-S1.   Minimal retrolisthesis at L5-S1. Alignment is otherwise normal.   Vertebral body heights are maintained. Disc spaces are relatively maintained.   There is diffusely hypointense bone marrow signal on the T1 weighted images which may be related to renal osteodystrophy. Differential considerations include anemia, reactive marrow or bone marrow infiltrating process.   There is no increased STIR signal to suggest marrow edema. Multilevel degenerative endplate changes include prominent Schmorl's nodes most pronounced at L3-4 where there are Modic type 2 changes and central  intradiscal T1 and T2 hypointense signal likely degenerative.   The conus medullaris terminates at L1-2. The visualized spinal cord, conus medullaris and cauda equina demonstrate normal signal and morphology.   Fatty atrophic changes involve the paravertebral musculature. Partially visualized kidneys demonstrate atrophic changes with multiple bilateral cysts, better characterized on previous body CT dated 01/14/2024. Partially visualized catheter within the inferior vena cava and right common iliac vein.   T10-11 on sagittal images only: No spinal canal or neural foraminal stenosis.   T11-12 on sagittal images only: No spinal canal or neural foraminal stenosis.   T12-L1: Facet arthropathy and ligamentum flavum thickening. No spinal canal or neural foraminal stenosis.   L1-2: Facet arthropathy and ligamentum flavum thickening. No spinal canal or neural foraminal stenosis.   L2-3: Facet arthropathy and ligamentum flavum thickening. No spinal canal or neural foraminal stenosis.   L3-4: Disc bulge, facet arthropathy and ligamentum flavum thickening. No spinal canal or neural foraminal stenosis.   L4-5: Disc bulge with small central protrusion, facet arthropathy and ligamentum flavum thickening. No spinal canal stenosis. Mild bilateral neural foraminal stenosis.   L5-S1: Disc bulge, facet arthropathy and ligamentum flavum thickening. No spinal canal stenosis. Mild right and no left neural foraminal stenosis.   Degenerative changes are noted at the sacroiliac joints.       Diffusely hypointense bone marrow signal on the T1 weighted images may be related to renal osteodystrophy. Differential considerations include anemia, reactive bone marrow or bone marrow infiltrating process.   No definite imaging findings to suggest discitis osteomyelitis on the current exam.   Degenerative changes without significant spinal canal stenosis. Varying degrees of mild neural foraminal narrowing as detailed above.   I personally reviewed  the images/study and I agree with the findings as stated by Dr. Lowe.   MACRO: None   Signed by: Eva Earle 5/4/2024 11:11 AM Dictation workstation:   [id number]    IR CVC exchange    Result Date: 5/3/2024  Interpreted By:  Ronaldo Dempsey, STUDY: IR CVC EXCHANGE; ;  5/3/2024 6:28 pm   FLUOROSCOPICALLY GUIDED EXCHANGE OF RIGHT GROIN TUNNELED HEMODIALYSIS CATHETER   INDICATION: Signs/Symptoms:Bacteremia- Right fem TDC exchange. 39-year-old man with end-stage renal disease, central thoracic venous occlusion, end-stage vascular access with bacteremia. Guidewire exchange of right groin tunneled hemodialysis catheter in an attempt to salvage the access.   COMPARISON: Chest radiograph 04/30/2024, fluoroscopic images from prior tunneled hemodialysis catheter placement 01/22/2024   ACCESSION NUMBER(S): [id number]   ORDERING CLINICIAN: SACHI MORTENSEN   TECHNIQUE:   ATTENDING : Ronaldo Dempsey M.D.   TECHNICAL DESCRIPTION/FINDINGS: The procedure, including all risks, benefits and alternatives were explained to the patient in detail. All questions were answered and written informed consent was obtained.   The patient was positioned supine on the angiography table. A time-out was performed.   The right groin was prepped and draped in usual sterile fashion. A  fluoroscopic image was obtained demonstrating the tunneled hemodialysis catheter terminating at the inferior cavoatrial junction.   1% lidocaine was administered via the subcutaneous tunnel tract for local anesthesia. Blunt dissection was performed to free the subcutaneous catheter cuff. An 035 stiff Glidewire was then advanced through the catheter into the right atrium. The old catheter was removed. Over the guidewire, a new 14.5 Greenlandic by 42 cm tip to cuff dual-lumen hemodialysis catheter was then placed. A completion fluoroscopic image demonstrates the catheter in the inferior right atrium.   Catheter lumens easily aspirated and flushed  and were locked with a concentrated heparinized solution (1000 units/cc). The catheter hub was then sutured to the skin with 2 0 Prolene stay suture. Because of losing along the tract, Gel-Foam pledgets were then administered subcutaneously near where the catheter enters the skin for additional hemostasis, followed by a pursestring suture. A sterile dressing was applied.   SEDATION/MEDICATIONS: Continuous cardiopulmonary monitoring was performed by a radiology nurse for the duration of the procedure.  1 mg Versed and   50 mcg Fentanyl were administered for moderate conscious sedation time of 20 minutes.      10 cc 1% Lidocaine was administered subcutaneously for local anesthesia. SPECIMENS: None. ESTIMATED BLOOD LOSS:  5 cc FLOUROSCOPY:  1.2 minutes; DAP  41365 mGy-cm*2; Air Kerma  59.07 mGy CONTRAST: None.   FINDINGS: Test       Fluoroscopically guidewire exchange of right groin tunneled hemodialysis catheter. The newly exchanged catheter is ready for immediate use.     MACRO: None   Signed by: Ronaldo Dempsey 5/3/2024 8:09 PM Dictation workstation:   [id number]    Transthoracic Echo Complete    Result Date: 5/3/2024   [facility], [address] [place] [address]              Tel [phone] and Fax [phone] TRANSTHORACIC ECHOCARDIOGRAM REPORT  Patient Name:      XIOMARA Maya Physician:    [id number] Brandon Nur DO Study Date:        5/3/2024             Ordering Provider:    [id number] VIEYRA MRN/PID:           [id number]             Fellow: Accession#:        [id number]         Nurse: Date of Birth/Age: 1985 / 39 years Sonographer:          Sy Lyons [facility] Gender:            M                    Additional Staff: Height:            175.00 cm            Admit Date: Weight:            65.80 kg             Admission Status:     Inpatient -                                                                Routine BSA / BMI:         1.80 m2 / 21.49      Encounter#:           [id number]                    kg/m2                                         Department Location:  [facility] Non                                                               Invasive Blood Pressure: 112 /74 mmHg Study Type:    TRANSTHORACIC ECHO (TTE) COMPLETE Diagnosis/ICD: Endocarditis, valve unspecified-I38 Indication:    endocarditis CPT Code:      Echo Complete w Full [id number] Patient History: Pertinent History: HTN and Hyperlipidemia. Study Detail: The following Echo studies were performed: M-Mode, Doppler, color               flow and 2D.  PHYSICIAN INTERPRETATION: Left Ventricle: The left ventricular systolic function is moderately decreased, with an estimated ejection fraction of 35-40%. There are no regional wall motion abnormalities. The left ventricular cavity size is normal. Abnormal (paradoxical) septal motion, consistent with left bundle branch block. Spectral Doppler shows an impaired relaxation pattern of left ventricular diastolic filling. Left Atrium: The left atrium is normal in size. Right Ventricle: The right ventricle is normal in size. There is normal right ventricular global systolic function. Right Atrium: The right atrium is normal in size. Aortic Valve: The aortic valve is probably trileaflet. There is no evidence of aortic valve regurgitation. Mitral Valve: The mitral valve is mildly thickened. There is trace mitral valve regurgitation. Tricuspid Valve: The tricuspid valve is structurally normal. No evidence of tricuspid regurgitation. Pulmonic Valve: The pulmonic valve is structurally normal. There is no indication of pulmonic valve regurgitation. Pericardium: There is no pericardial effusion noted. Aorta: The aortic root is normal. In comparison to the previous echocardiogram(s): Compared with study from 1/16/2024, LV function has declined. Was previously normal.  CONCLUSIONS:  1. Left ventricular systolic  function is moderately decreased with a 35-40% estimated ejection fraction.  2. Abnormal septal motion consistent with left bundle branch block.  3. Spectral Doppler shows an impaired relaxation pattern of left ventricular diastolic filling.  4. No vegetations visualized within the limitations of this study.  5. Compared with study from 1/16/2024, LV function has declined. Was previously normal. QUANTITATIVE DATA SUMMARY: LA VOLUME:                               Normal Ranges: LA Vol A4C:        40.1 ml    (22+/-6mL/m2) LA Vol A2C:        35.3 ml LA Vol BP:         40.9 ml LA Vol Index A4C:  22.3ml/m2 LA Vol Index A2C:  19.6 ml/m2 LA Vol Index BP:   22.7 ml/m2 LA Area A4C:       15.3 cm2 LA Area A2C:       13.2 cm2 LA Major Axis A4C: 5.0 cm LA Major Axis A2C: 4.2 cm LA Volume Index:   22.7 ml/m2  [id number] Brandon Nur DO Electronically signed on 5/3/2024 at 4:24:04 PM  ** Final **     Lower extremity venous duplex right    Result Date: 5/1/2024             [place] [address]   Tel [phone] and Fax [phone]  Vascular Lab Report [facility] US LOWER EXTREMITY VENOUS DUPLEX RIGHT  Patient Name:      XIOMARA Maya Physician:  [id number] Rayo Jauregui MD, RPVI Study Date:        4/30/2024            Ordering Physician: [id number] PANIAGUA MRN/PID:           [id number]             Technologist:       Megan Shay RVT Accession#:        [id number]         Technologist 2: Date of Birth/Age: 1985 / 39 years Encounter#:         [id number] Gender:            M Admission Status:  Emergency            Location Performed: [facility]  Diagnosis/ICD: Pain in right leg-M79.604 CPT Codes:     17933 Peripheral venous duplex scan for DVT Limited  **CRITICAL RESULT** Critical Result: Acute DVT in the right leg. Notification called to Deep Paniagua PA-C on 4/30/2024 at 10:52:21 AM by Megan Shay RDMS, RVT.   CONCLUSIONS: Right Lower Venous: There is acute non-occlusive deep vein thrombosis visualized in the common femoral vein. There are chronic changes visualized in the popliteal vein. Enlarged lymph node noted in the right groin. Waveforms suggestive of more distal obstruction/thrombus. May wish further means of imaging evaluation. Left Lower Venous: There are chronic changes visualized in the common femoral vein.  Comparison: Compared with study from 7/27/2023, New finding of acute non-occlusive thrombus in the right CFV.  Imaging & Doppler Findings:  Right                 Compressible      Thrombus          Flow Distal External Iliac                     None CFV                     Partial    Acute non-occlusive Continuous PFV                       Yes             None FV Proximal               Yes             None         Continuous FV Mid                    Yes             None FV Distal                 Yes             None Popliteal                 Yes            Chronic       Continuous Peroneal                  Yes             None PTV                       Yes             None  Left Compress Thrombus        Flow CFV    Yes    Chronic  Spontaneous/Phasic  [id number] Rayo Jauregui MD, MARIANO Electronically signed by [id number] MARIANO Call MD on 5/1/2024 at 9:13:07 AM  ** Final **     XR chest 1 view    Result Date: 4/30/2024  STUDY: Chest Radiograph;  4/30/2024 at 2:42 PM. INDICATION: Fever. COMPARISON: XR chest 1/14/2024, 11/10/2023; CTA chest 2/15/2023. ACCESSION NUMBER(S): [id number] ORDERING CLINICIAN: WENDY INFANTE TECHNIQUE:  Frontal chest was obtained at 14:42 hours. FINDINGS: CARDIOMEDIASTINAL SILHOUETTE: Cardiomediastinal silhouette is normal in size and configuration.  LUNGS: Patchy right basilar infiltrate or atelectasis with small right pleural effusion. Left basilar scar versus atelectasis. ABDOMEN: No remarkable upper abdominal findings.  BONES: No acute osseous changes.    Patchy right basilar  infiltrate or atelectasis with small right pleural effusion. Signed by Joseph Overton MD    CT thoracic spine wo IV contrast    Result Date: 4/30/2024  STUDY: CT Thoracic Spine and Lumbar Spine without IV Contrast; 4/30/2024 11:11 AM INDICATION: Midline back pain. COMPARISON: None Available. ACCESSION NUMBER(S): [id number], [id number] ORDERING CLINICIAN: RAJNI FARMER TECHNIQUE:  CT of the thoracic spine and lumbar spine was performed without intravenous or intrathecal contrast.  Sagittal and coronal reconstructions were generated.  Automated mA/kV exposure control was utilized and patient examination was performed in strict accordance with principles of ALARA. FINDINGS: Portions of the T1 vertebral body, as well as a small portion of the anterior superior aspect of T2 is cut off on this study.  No compression deformities are visualized within the thoracic vertebral bodies from T2 through T12.  No spondylolisthesis is noted.  No pedicular defects are noted.  No prominent edema is appreciated within the adjacent posterior paravertebral musculature.  Evaluation of the thecal sac is limited due to lack of myelographic contrast.  There are enlarged lymph nodes seen within the visualized portions of the mediastinum.  There are bilateral lower lobe infiltrates with tiny bilateral effusions.  Coronary artery calcifications are present. No compression deformities are visualized within the lumbar spine.  No pars defects are noted.  No significant spondylolisthesis is seen.  No prominent edema is appreciated within the visualized portions of the psoas musculature.  A long dialysis catheter is visualized within the inferior vena cava.  Evaluation of the thecal sac is limited due to lack of myelographic contrast.  There is disc bulging seen from L3 through S1.  No significant impingement is appreciated upon the neural foramina on the sagittal reconstructions.    Thoracic spine: 1.  Portions of T1 and T2 cough on this study.  2.  No acute osseous findings seen from T3 through T12. 3.  Tiny bilateral effusions with adjacent lower lobe infiltrates. 4.  The distal adenopathy. 5.  Coronary artery calcifications. Lumbar spine: 1.  No compression deformities or spinal listhesis is noted. 2.  Disc bulging from L3 to S1.  Evaluation for spinal stenosis is limited due to lack mammographic contrast. 3.  Incidental note of a long dialysis catheter within the inferior vena cava. Signed by Deejay Hoffman MD    CT lumbar spine wo IV contrast    Result Date: 4/30/2024  STUDY: CT Thoracic Spine and Lumbar Spine without IV Contrast; 4/30/2024 11:11 AM INDICATION: Midline back pain. COMPARISON: None Available. ACCESSION NUMBER(S): [id number], [id number] ORDERING CLINICIAN: RAJNI FARMER TECHNIQUE:  CT of the thoracic spine and lumbar spine was performed without intravenous or intrathecal contrast.  Sagittal and coronal reconstructions were generated.  Automated mA/kV exposure control was utilized and patient examination was performed in strict accordance with principles of ALARA. FINDINGS: Portions of the T1 vertebral body, as well as a small portion of the anterior superior aspect of T2 is cut off on this study.  No compression deformities are visualized within the thoracic vertebral bodies from T2 through T12.  No spondylolisthesis is noted.  No pedicular defects are noted.  No prominent edema is appreciated within the adjacent posterior paravertebral musculature.  Evaluation of the thecal sac is limited due to lack of myelographic contrast.  There are enlarged lymph nodes seen within the visualized portions of the mediastinum.  There are bilateral lower lobe infiltrates with tiny bilateral effusions.  Coronary artery calcifications are present. No compression deformities are visualized within the lumbar spine.  No pars defects are noted.  No significant spondylolisthesis is seen.  No prominent edema is appreciated within the visualized portions of  the psoas musculature.  A long dialysis catheter is visualized within the inferior vena cava.  Evaluation of the thecal sac is limited due to lack of myelographic contrast.  There is disc bulging seen from L3 through S1.  No significant impingement is appreciated upon the neural foramina on the sagittal reconstructions.    Thoracic spine: 1.  Portions of T1 and T2 cough on this study. 2.  No acute osseous findings seen from T3 through T12. 3.  Tiny bilateral effusions with adjacent lower lobe infiltrates. 4.  The distal adenopathy. 5.  Coronary artery calcifications. Lumbar spine: 1.  No compression deformities or spinal listhesis is noted. 2.  Disc bulging from L3 to S1.  Evaluation for spinal stenosis is limited due to lack mammographic contrast. 3.  Incidental note of a long dialysis catheter within the inferior vena cava. Signed by Deejay Hoffman MD    XR pelvis 1-2 views    Result Date: 4/30/2024  STUDY: Pelvis Radiographs; 4/30/2024 at 9:06 AM. INDICATION: Pain. COMPARISON: CT pelvis 7/17/2023. ACCESSION NUMBER(S): [id number] ORDERING CLINICIAN: RAJNI FARMER TECHNIQUE:  One view(s) of the pelvis. FINDINGS:  The pelvic ring is intact.  There is no acute fracture.  Severe underlying osteopenia.    Slightly limited secondary to underlying osteopenia.  No acute osseous abnormality. Signed by Roe Ruiz MD       Current Facility-Administered Medications:     [Held by provider] apixaban (Eliquis) tablet 5 mg, 5 mg, oral, BID, OSBALDO Wilson, DNP, 5 mg at 05/03/24 2156    atorvastatin (Lipitor) tablet 40 mg, 40 mg, oral, Nightly, OSBALDO Wilson DNP, 40 mg at 05/13/24 2335    B complex-vitamin C-folic acid (Nephrocaps) capsule 1 capsule, 1 capsule, oral, Daily, OSBALDO Wilson DNP, 1 capsule at 05/14/24 0919    cyclobenzaprine (Flexeril) tablet 5 mg, 5 mg, oral, TID PRN, El Estrada MD, 5 mg at 05/14/24 0947    dextrose 50 % injection 12.5 g, 12.5 g, intravenous, q15  min PRN, Veronica De La Paz MD    dextrose 50 % injection 25 g, 25 g, intravenous, q15 min PRN, Mike Santana MD    epoetin tyson-epbx (Retacrit) injection 8,000 Units, 100 Units/kg, subcutaneous, Every Mon/Wed/Fri, Eber CHRIS Bruce, DO, 8,000 Units at 05/10/24 1724    fentaNYL PF (Sublimaze) injection, , , PRN, Ronaldo Dempsey MD, 25 mcg at 05/13/24 1513    glucagon (Glucagen) injection 1 mg, 1 mg, intramuscular, q15 min PRN, Veronica De La Paz MD    heparin 1,000 unit/mL injection 3,100 Units, 3,100 Units, intra-catheter, After Dialysis, OSBALDO Wilson DNP, 3,100 Units at 05/08/24 1257    heparin 1,000 unit/mL injection 3,100 Units, 3,100 Units, intra-catheter, After Dialysis, OSBALDO Wilson DNP, 3,100 Units at 05/08/24 1256    heparin 1,000 unit/mL injection, , , PRN, Ronaldo Demspey MD, 1,800 Units at 05/13/24 2329    insulin glargine (Lantus) injection 2 Units, 2 Units, subcutaneous, Nightly, Mike Santana MD    insulin lispro (HumaLOG) injection 0-5 Units, 0-5 Units, subcutaneous, TID with meals, Veronica De La Paz MD, 1 Units at 05/11/24 1712    levothyroxine (Synthroid, Levoxyl) tablet 125 mcg, 125 mcg, oral, Daily before breakfast, OSBALDO Wilson DNP, 125 mcg at 05/14/24 0656    lidocaine (Xylocaine) 20 mg/mL (2 %) injection, , , PRN, Ronaldo Dempsey MD, 5 mL at 05/13/24 1513    lidocaine PF (Xylocaine) 10 mg/mL (1 %) injection, , , PRN, Ronaldo Dempsey MD, 5 mL at 05/03/24 1816    loperamide (Imodium) capsule 2 mg, 2 mg, oral, q4h PRN, OSBALDO Wilson DNP    midazolam (Versed) injection, , , PRN, Ronaldo Dempsey MD, 0.5 mg at 05/13/24 1513    nitroglycerin (Nitrostat) SL tablet 0.4 mg, 0.4 mg, sublingual, q5 min PRN, OSBALDO Wilson DNP    ondansetron (Zofran) injection 4 mg, 4 mg, intravenous, q6h PRN, OSBALDO Wilson DNP    oxyCODONE (Roxicodone) immediate release tablet 5 mg, 5 mg, oral, q4h  PRN, AMARILYS Wilson-CNP, DNP, 5 mg at 05/14/24 0947    oxygen (O2) therapy, , , Continuous PRN, Ronaldo Dempsey MD, Last Rate: 120,000 mL/hr at 05/03/24 1758, 2 L/min at 05/03/24 1758    pantoprazole (ProtoNix) injection 40 mg, 40 mg, intravenous, BID, Shannan Schwartz, APRN-CNP, 40 mg at 05/14/24 0947    polyethylene glycol (Glycolax, Miralax) packet 17 g, 17 g, oral, Daily, Andi Pearceritomasa, APRN-CNP, DNP, 17 g at 05/04/24 0624    sennosides (Senokot) tablet 8.6 mg, 8.6 mg, oral, BID, Andi PearceriABELINO randolphN-CNP, DNP, 8.6 mg at 05/12/24 2239    sevelamer carbonate (Renvela) tablet 2,400 mg, 2,400 mg, oral, TID with meals, Andi Pearceritomasa APRN-CNP, DNP, 2,400 mg at 05/13/24 1600    simethicone (Mylicon) chewable tablet 80 mg, 80 mg, oral, q8h PRN, Andi Bazan APRN-CNP, DNP    sodium zirconium cyclosilicate (Lokelma) packet 10 g, 10 g, oral, Daily, Eber Bruce,     vancomycin (Vancocin) pharmacy to dose - pharmacy monitoring, , miscellaneous, Daily PRN, Andiba Pearceritomasa APRN-CNP, DNP           Assessment/Plan   1.  SIRS, continue current management antibiotics  2.  ESKD on hemodialysis, goes to [facility] on Monday/Wednesday/Friday  We will schedule patient for dialysis tomorrow.  3.  Bacteremia with MRSA, status post multiple dialysis catheter placement and replacements, following with ID  4.  Diabetes mellitus continue current management  5.  Hypertension, continue current management  6.  Anemia of CKD, will continue admission SINA and iron supplementation as needed  7.  Metabolic bone disease due to CKD we will continue with phosphate binders and vitamin D products.  8.  Severe deconditioning, will continue with physical therapy.      Will round patient on daily basis reviewing labs and other consultants input    Principal Problem:    SIRS (systemic inflammatory response syndrome) (Multi)              I spent 35 minutes in the professional and overall care of this  patient.      Gracie Hollins MD

## 2024-05-14 NOTE — POST-PROCEDURE NOTE
Physician Transition of Care Summary  Invasive Cardiovascular Lab    Procedure Date: 5/14/2024  Attending: Edward Hernandez DO    Resident/Fellow/Other Assistant: * No surgeons found in log *    Indications:   Bacteremia    Post-procedure diagnosis:   Bacteremia    Procedure(s):   Transesophageal Echocardiogram       Procedure Findings:   No Vegetations     Description of the Procedure:   Counselling was provided to the patient including risks and benefits of the procedure. Patient provided written consent for transesophageal echocardiogram.  Universal Protocol: a time out was performed and the correct patient and procedure were verified.     Patient received Lidocaine gargle and spray to suppress gag reflex.  IV sedation was provided by anesthesia services.  Cardiologist intubated the patient's esophagus with probe while the Sonographer operated the ultrasound machine controls.  After all images were acquired and reviewed by the Cardiologist, the esophagus was extubated.      Complications:   None    Stents/Implants:   Implants       No implant documentation for this case.            Estimated Blood Loss:   0 mL    Anesthesia: * No anesthesia type entered * Anesthesia Staff: Anesthesiologist: Paulie Gonzalez MD  CRNA: CHYNA Patterson    Any Specimen(s) Removed:   No specimens collected during this procedure.    Disposition:   Return to floor for additional care as per primary service      Electronically signed by: Edward Hernandez DO, 5/14/2024 2:24 PM

## 2024-05-14 NOTE — PROGRESS NOTES
"Edwar Hemphill is a 39 y.o. male on day 14 of admission presenting with SIRS (systemic inflammatory response syndrome) (Multi).    Subjective   Patient refused glargine last night  Refused glargine 3 of the last 4 nights    Scheduled medications  [Held by provider] apixaban, 5 mg, oral, BID  atorvastatin, 40 mg, oral, Nightly  B complex-vitamin C-folic acid, 1 capsule, oral, Daily  epoetin tyson or biosimilar, 100 Units/kg, subcutaneous, Every Mon/Wed/Fri  heparin, 3,100 Units, intra-catheter, After Dialysis  heparin, 3,100 Units, intra-catheter, After Dialysis  insulin glargine, 2 Units, subcutaneous, Nightly  insulin lispro, 0-5 Units, subcutaneous, TID with meals  levothyroxine, 125 mcg, oral, Daily before breakfast  pantoprazole, 40 mg, intravenous, BID  polyethylene glycol, 17 g, oral, Daily  sennosides, 8.6 mg, oral, BID  sevelamer carbonate, 2,400 mg, oral, TID with meals  sodium zirconium cyclosilicate, 10 g, oral, Daily      Continuous medications  oxygen, , Last Rate: 2 L/min (05/03/24 1758)      Objective   Physical Exam  Constitutional:       General: He is not in acute distress.  Neurological:      Mental Status: He is alert.         Last Recorded Vitals  Blood pressure 118/77, pulse (!) 123, temperature 36.6 °C (97.9 °F), temperature source Oral, resp. rate 18, height 1.753 m (5' 9.02\"), weight 72.9 kg (160 lb 12.8 oz), SpO2 93%.  Intake/Output last 3 Shifts:  I/O last 3 completed shifts:  In: 1200 (16.5 mL/kg) [I.V.:800 (11 mL/kg); Other:400]  Out: 1400 (19.2 mL/kg) [Other:1400]  Weight: 72.9 kg     Relevant Results  Results from last 7 days   Lab Units 05/13/24  3631 05/13/24  1549 05/13/24  1156 05/13/24  0757 05/12/24  2237 05/12/24  0754 05/12/24  0710 05/11/24  0805 05/11/24  0631 05/10/24  0744 05/10/24  0527 05/08/24  1137 05/08/24  0632   POCT GLUCOSE mg/dL 104* 138* 121* 124* 106*   < >  --    < >  --    < >  --    < >  --    GLUCOSE mg/dL  --   --   --   --   --   --  164*  --  122*  --  206*  " --  51*    < > = values in this interval not displayed.     Assessment/Plan   Principal Problem:    SIRS (systemic inflammatory response syndrome) (Multi)    IMPRESSION  DIABETIC KETOACIDOSIS, RESOLVED  TYPE 2 DIABETES MELLITUS  LONG TERM CURRENT INSULIN USE  Glargine refused last night  Glucose remains decent   History of DKA on this admission, however         RECOMMENDATIONS  I discussed the importance of even minimal basal insulin to prevent recurrence of DKA  Recomend nsulin glargine 2 units at bedtime, for now  Insulin lispro scale QAC    Mike Santana MD

## 2024-05-14 NOTE — ANESTHESIA PREPROCEDURE EVALUATION
Patient: Edwar Hemphill    Procedure Information       Date/Time: 05/14/24 1330    Scheduled providers: Gerardo Alas MD; Edward Hernandez DO    Procedure: TRANSESOPHAGEAL ECHO (POLI)    Location: Formerly named [facility]; Formerly named [facility]            Relevant Problems   Cardiac   (+) HLD (hyperlipidemia)   (+) Hypertension   (+) PVD (peripheral vascular disease) (CMS-HCC)      GI   (+) GERD (gastroesophageal reflux disease)      /Renal   (+) ESRD (end stage renal disease) on dialysis (Multi)      Endocrine   (+) Hypothyroidism   (+) Type 1 diabetes mellitus (Multi)      ID   (+) Septic shock (Multi)       Clinical information reviewed:    Allergies  Meds                Past Medical History:   Diagnosis Date   • Chronic kidney disease    • Diabetes mellitus (Multi)    • ESRD (end stage renal disease) (Multi)    • Essential (primary) hypertension 05/26/2017    HTN (hypertension), benign   • GERD (gastroesophageal reflux disease)    • Hyperlipidemia    • Hypothyroidism       Past Surgical History:   Procedure Laterality Date   • ARM AMPUTATION AT ELBOW Right 05/2021   • AV FISTULA PLACEMENT W/ PTFE     • CT AORTA AND BILATERAL ILIOFEMORAL RUNOFF ANGIOGRAM W AND/OR WO IV CONTRAST  02/09/2023    CT AORTA AND BILATERAL ILIOFEMORAL RUNOFF ANGIOGRAM W AND/OR WO IV CONTRAST 2/9/2023 DOCTOR OFFICE [facility]   • IR CVC EXCHANGE  11/13/2023    IR CVC EXCHANGE 11/13/2023 U CVEPINV   • IR CVC PICC  10/19/2023    IR CVC PICC   • IR CVC PICC  2/28/2022    IR CVC PICC   • LEG AMPUTATION THROUGH KNEE Left 07/08/2023   • LEG AMPUTATION THROUGH LOWER TIBIA AND FIBULA Left 07/05/2023   • TOE AMPUTATION Left 02/17/2023    left 4th   • WOUND DEBRIDEMENT Left 07/26/2023    leg   • WOUND DEBRIDEMENT Left 08/02/2023    multiple leg debridements     Social History     Tobacco Use   • Smoking status: Every Day     Current packs/day: 0.25     Average packs/day: 0.3 packs/day for 15.0 years (3.8 ttl pk-yrs)     Types: Cigarettes   •  Smokeless tobacco: Never      Current Outpatient Medications   Medication Instructions   • acetaminophen (TYLENOL) 650 mg, oral, Every 4 hours PRN   • apixaban (ELIQUIS) 5 mg, oral, 2 times daily   • atorvastatin (LIPITOR) 40 mg, oral, Daily   • B complex-vitamin C-folic acid (Nephrocaps) 1 mg capsule 1 capsule, oral, Daily   • Basaglar KwikPen U-100 Insulin 12 Units, subcutaneous, Nightly   • epoetin tyson-epbx (RETACRIT) 10,000 Units, subcutaneous, 3 times weekly   • insulin glargine (LANTUS) 12 Units, subcutaneous, Nightly, Take as directed per insulin instructions.   • insulin lispro (HUMALOG) 0-15 Units, subcutaneous, 3 times daily with meals, Take as directed per insulin instructions.   • levothyroxine (SYNTHROID, LEVOXYL) 125 mcg, oral, Daily before breakfast   • loperamide (IMODIUM) 4 mg, oral, Every 2 hour PRN   • melatonin 3 mg, oral, Nightly PRN   • nitroglycerin (NITROSTAT) 0.4 mg, sublingual, Every 5 min PRN   • oxyCODONE-acetaminophen (Percocet) 5-325 mg tablet 1 tablet, oral, Every 6 hours PRN   • pantoprazole (PROTONIX) 40 mg, oral, Daily before breakfast, Do not crush, chew, or split.   • pantoprazole (PROTONIX) 40 mg, oral, Daily before breakfast, Do not crush, chew, or split.   • polyethylene glycol (GLYCOLAX, MIRALAX) 17 g, oral, Daily RT, DISSOLVE IN 4-8 OUNCES OF LIQUID AND TAKE ORALLY AS DIRECTED   • sennosides (SENOKOT) 8.6 mg, oral, 2 times daily   • sevelamer carbonate (RENVELA) 2,400 mg, oral, 3 times daily with meals   • simethicone (MYLICON) 80 mg, oral, Every 8 hours PRN      Allergies   Allergen Reactions   • Cashew Nut Anaphylaxis and Swelling     cashews        Chemistry    Lab Results   Component Value Date/Time     (L) 05/14/2024 0625    K 4.1 05/14/2024 0625    CL 94 (L) 05/14/2024 0625    CO2 21 05/14/2024 0625    BUN 22 05/14/2024 0625    CREATININE 4.47 (H) 05/14/2024 0625    Lab Results   Component Value Date/Time    CALCIUM 8.5 (L) 05/14/2024 0625    ALKPHOS 156 (H)  "04/30/2024 1345    AST 10 04/30/2024 1345    ALT 10 04/30/2024 1345    BILITOT 1.3 (H) 04/30/2024 1345          Lab Results   Component Value Date    HGBA1C 6.8 (H) 03/30/2024     Lab Results   Component Value Date/Time    WBC 7.7 05/14/2024 0625    HGB 7.8 (L) 05/14/2024 0625    HCT 25.4 (L) 05/14/2024 0625     05/14/2024 0625     Lab Results   Component Value Date/Time    PROTIME 11.9 09/13/2023 1351    INR 1.1 09/13/2023 1351     No results found for: \"ABORH\"  Encounter Date: 01/14/24   Electrocardiogram, 12-lead PRN ACS symptoms   Result Value    Ventricular Rate 98    Atrial Rate 98    GA Interval 150    QRS Duration 82    QT Interval 368    QTC Calculation(Bazett) 469    P Axis 60    R Axis 43    T Axis 79    QRS Count 17    Q Onset 222    P Onset 147    P Offset 202    T Offset 406    QTC Fredericia 433    Narrative    Normal sinus rhythm  Low voltage QRS  Prolonged QT  Abnormal ECG  Confirmed by Gerardo Jimenez ([id number]) on 1/16/2024 11:43:42 AM     No results found for this or any previous visit from the past 1095 days.       Visit Vitals  /69   Pulse 109   Temp 37.2 °C (99 °F) (Tympanic)   Resp 18   Ht 1.753 m (5' 9.02\")   Wt 72.9 kg (160 lb 12.8 oz)   SpO2 96%   BMI 23.74 kg/m²   Smoking Status Every Day   BSA 1.88 m²     NPO/Void Status  Date of Last Liquid: 05/13/24  Time of Last Liquid: 1800  Date of Last Solid: 05/13/24  Time of Last Solid: 1800         Physical Exam    Airway  TM distance: >3 FB  Neck ROM: full     Cardiovascular - normal exam     Dental - normal exam     Pulmonary - normal exam     Abdominal - normal exam            Anesthesia Plan    History of general anesthesia?: yes  History of complications of general anesthesia?: no    ASA 2     MAC     The patient is a current smoker.    intravenous induction   Anesthetic plan and risks discussed with patient.    Plan discussed with CRNA.      "

## 2024-05-14 NOTE — PROGRESS NOTES
.Report to Receiving RN:    Report To: COLT LARA  Time Report GIVEN: 23:34  Hand-Off Communication: PT STABLE POST TREATMENT. PT TOLERATED TX WITHOUT ANY ISSUES. PT IS COMPLAING OF BACK PAIN .  RATED IT 10/10. RN COLT NOTIFIED. PAIN MEDS GIVEN POST TREATMENT. FLUID REMOVAL OF 1LITER. /92 HR (121)  Complications During Treatment: No  Ultrafiltration Treatment: No  Medications Administered During Dialysis: No  Blood Products Administered During Dialysis: No  Labs Sent During Dialysis: No  Heparin Drip Rate Changes: No  Dialysis Catheter Dressing: DRESSING CHANGED PRIOR TO TREATMENT BY TECHNICIAN JUAN ANTONIO BROOKE (OCDT)  Last Dressing Change: 5/13/24    Electronic Signatures:  23:32 (Signed JUAN ANTONIO BROOKE)       Last Updated: 11:32 PM by JUAN ANTONIO BROOKE

## 2024-05-14 NOTE — PROGRESS NOTES
05/14/24 0828   Discharge Planning   Patient expects to be discharged to: [facility]     Admitted with positive blood cultures. Temp dialysis cath placed yesterday, POLI ordered for today by ID. Patient LTC at [facility], no barriers to return when medically cleared.     Addendum 1025- Per MD plan for line today, dc back to facility tomorrow. Facility updated.

## 2024-05-14 NOTE — PROGRESS NOTES
"  INFECTIOUS DISEASE DAILY PROGRESS NOTE    SUBJECTIVE:    POLI done today - negative for endocarditis. No fevers.     OBJECTIVE:  VITALS (Last 24 Hours)  /75 (BP Location: Right arm, Patient Position: Lying)   Pulse 110   Temp 36.9 °C (98.4 °F) (Oral)   Resp 18   Ht 1.753 m (5' 9.02\")   Wt 72.9 kg (160 lb 12.8 oz)   SpO2 99%   BMI 23.74 kg/m²     PHYSICAL EXAM:  Gen - not in distress  Abd - soft, no ttp, BS present  RLE - s/p AKA  RUE - s/p amputation  Skin - no rash     ABX: IV Vancomycin    LABS:  Lab Results   Component Value Date    WBC 7.7 05/14/2024    HGB 7.8 (L) 05/14/2024    HCT 25.4 (L) 05/14/2024    MCV 92 05/14/2024     05/14/2024     Lab Results   Component Value Date    GLUCOSE 176 (H) 05/14/2024    CALCIUM 8.5 (L) 05/14/2024     (L) 05/14/2024    K 4.1 05/14/2024    CO2 21 05/14/2024    CL 94 (L) 05/14/2024    BUN 22 05/14/2024    CREATININE 4.47 (H) 05/14/2024     Results from last 72 hours   Lab Units 05/14/24  0625   ALBUMIN g/dL 2.8*     Estimated Creatinine Clearance: 22 mL/min (A) (by C-G formula based on SCr of 4.47 mg/dL (H)).    ASSESSMENT/PLAN:     CLABSI (HD cath POA) due to MRSA - line exchanged 5/3. Not having a full line holiday because of risk of losing HD access. TTE without endocarditis. Repeat blood cx 5/5 and 5/7 still positive. HD cath removed on 5/9. 5/10 blood cx still positive. New temp HD cath placed 5/13. POLI negative for endocarditis 5/14.  Lumbar Spine Pain - CT without epidural abscess. MRI without OM/discitis.  DM II with DKA - DKA resolved     MRSA HUMBERTO for Vancomycin is 1. Dapto/Ceftaroline are susceptible.      IV Vancomycin still. If latest set of blood cx are positive still will try dual abx therapy.     Monitoring for adverse effects of abx such as rash/itching/diarrhea - none apparent.     Will follow. Thanks!    Mat aJuregui MD  [facility]  Office #[phone]      "

## 2024-05-14 NOTE — INTERVAL H&P NOTE
H&P reviewed. The patient was examined and there are no changes to the H&P. Here for POLI.     ASA IV.    Mallampati III.    AMARILYS Rousseau-CNP  Interventional Cath Lab/Cardiology  [facility]

## 2024-05-14 NOTE — CARE PLAN
The patient's goals for the shift include  pt will remain HDS     The clinical goals for the shift include pt will maintain skin integrity this shift    Problem: Pain  Goal: My pain/discomfort is manageable  Outcome: Progressing     Problem: Safety  Goal: Patient will be injury free during hospitalization  Outcome: Progressing  Goal: I will remain free of falls  Outcome: Progressing     Problem: Daily Care  Goal: Daily care needs are met  Outcome: Progressing     Problem: Psychosocial Needs  Goal: Demonstrates ability to cope with hospitalization/illness  Outcome: Progressing  Goal: Collaborate with me, my family, and caregiver to identify my specific goals  Outcome: Progressing

## 2024-05-14 NOTE — PROGRESS NOTES
Edwar Hemphill is a 39 y.o. male on day 14 of admission presenting with SIRS (systemic inflammatory response syndrome) (Multi).      Subjective   Going for line placement and then hd       Objective     Last Recorded Vitals  /56 (BP Location: Left arm, Patient Position: Lying)   Pulse (!) 112   Temp 36.4 °C (97.6 °F) (Temporal)   Resp 18   Wt 72.9 kg (160 lb 12.8 oz)   SpO2 92%   Intake/Output last 3 Shifts:    Intake/Output Summary (Last 24 hours) at 5/14/2024 1024  Last data filed at 5/14/2024 0916  Gross per 24 hour   Intake 1200 ml   Output 1400 ml   Net -200 ml       Admission Weight  Weight: 65.8 kg (145 lb) (04/30/24 0825)    Daily Weight  05/14/24 : 72.9 kg (160 lb 12.8 oz)    Image Results  IR CVC tunneled  Narrative: Interpreted By:  Ronaldo Dempsey,   STUDY:  IR CVC TUNNELED; ;  5/13/2024 3:26 pm      ULTRASOUND FLUOROSCOPICALLY GUIDED RIGHT GROIN TEMPORARY HEMODIALYSIS  CATHETER PLACEMENT      INDICATION:  Signs/Symptoms:Temporary dialysis line today. 39-year-old man with  end-stage renal disease, persistent bacteremia despite removal of  groin tunneled hemodialysis catheter. Requires replacement of  hemodialysis access.      COMPARISON:  Fluoroscopic images from prior tunneled hemodialysis catheter  exchange 05/13/2024      ACCESSION NUMBER(S):  [id number]      ORDERING CLINICIAN:  BRADEN QUIROGA      TECHNIQUE:      ATTENDING : Ronaldo Dempsey M.D.      TECHNICAL DESCRIPTION/FINDINGS: The procedure, including all risks,  benefits and alternatives were explained to the patient in detail.  All questions were answered and written informed consent was obtained.      Patient was positioned supine on the fluoroscopy table. A time-out  was performed.      The bilateral groins were prepped and draped in usual sterile  fashion. Focused ultrasound was performed over the superior aspect of  the right common femoral vein demonstrating superior patency with  partial compressibility.  Ultrasound images were saved. 1% lidocaine  was administered subcutaneously for local anesthesia. Under real-time  ultrasound guidance, the right common femoral vein was accessed in  antegrade direction using micropuncture technique. Ultrasound images  were saved. Under fluoroscopic visualization, an 018 guidewire was  advanced into the right common iliac vein and micropuncture  transitional introducer was utilized for placement of an 035  guidewire into the IVC. After serial dilation, a 13 Macanese by 20 cm  Trialysis catheter was then placed. A completion fluoroscopic image  was obtained demonstrating the tip of the temporary hemodialysis  catheter projecting over the IV C.      Catheter lumens easily aspirated and flushed. Large-bore dialysis  lumens were locked with a concentrated heparinized solution (1000  units/cc). A small bore 3rd lumen was locked with a dilute  heparinized solution. The catheter hub was sutured to the skin with 2  0 Prolene stay suture. Sterile dressing was applied.      SEDATION/MEDICATIONS: Continuous cardiopulmonary monitoring was  performed by a radiology nurse for the duration of the procedure.  0.5 mg Versed and   25 mcg Fentanyl were administered for moderate  conscious sedation time of 20 minutes.      10 cc 1% Lidocaine was  administered subcutaneously for local anesthesia. SPECIMENS: None.  ESTIMATED BLOOD LOSS:  5 cc  FLOUROSCOPY:  0.1 minutes; DAP  2405 mGy-cm*2; Air Kerma  6.58 mGy  CONTRAST: None.      FINDINGS:  Test      Impression: Ultrasound fluoroscopically guided right groin temporary hemodialysis  catheter. The catheter is ready for immediate use.          MACRO:  None      Signed by: Ronaldo Dempsey 5/13/2024 4:59 PM  Dictation workstation:   [id number]      PHYSICAL EXAM  NEUROLOGICAL: No abnormal movements, no changes from before  HEENT: Head atraumatic, perrl,eomi, no oral lesions, no ear rash   NECK: Supple, no ln, jvp flat, thyroid palp  HEART: S1, S2, no  added sound  LUNGS: CTAB, no crackles, no rales, no wheezing, no dullness to percussion, sym exp  EXTREMITIES: no edema  ABDOMEN: Soft, nontender, bowel sound positive, no organomegaly  SKIN: No change  JOINT: No change  Relevant Results               Assessment/Plan        This patient has a central line   Reason for the central line remaining today? Dialysis/Hemapheresis            Principal Problem:    SIRS (systemic inflammatory response syndrome) (Multi)    Esrd  T2dm  Anemia  Oa    PLAN  Line and HD and then dc plans for am    Informed      [id number]              Mauricio Estrada MD

## 2024-05-15 ENCOUNTER — APPOINTMENT (OUTPATIENT)
Dept: DIALYSIS | Facility: HOSPITAL | Age: 39
End: 2024-05-15
Payer: COMMERCIAL

## 2024-05-15 LAB
GLUCOSE BLD MANUAL STRIP-MCNC: 166 MG/DL (ref 74–99)
GLUCOSE BLD MANUAL STRIP-MCNC: 177 MG/DL (ref 74–99)
GLUCOSE BLD MANUAL STRIP-MCNC: 200 MG/DL (ref 74–99)
GLUCOSE BLD MANUAL STRIP-MCNC: 221 MG/DL (ref 74–99)

## 2024-05-15 PROCEDURE — C9113 INJ PANTOPRAZOLE SODIUM, VIA: HCPCS

## 2024-05-15 PROCEDURE — 2500000002 HC RX 250 W HCPCS SELF ADMINISTERED DRUGS (ALT 637 FOR MEDICARE OP, ALT 636 FOR OP/ED): Performed by: INTERNAL MEDICINE

## 2024-05-15 PROCEDURE — 2500000001 HC RX 250 WO HCPCS SELF ADMINISTERED DRUGS (ALT 637 FOR MEDICARE OP): Performed by: NURSE PRACTITIONER

## 2024-05-15 PROCEDURE — 2500000006 HC RX 250 W HCPCS SELF ADMINISTERED DRUGS (ALT 637 FOR ALL PAYERS): Performed by: NURSE PRACTITIONER

## 2024-05-15 PROCEDURE — 2500000004 HC RX 250 GENERAL PHARMACY W/ HCPCS (ALT 636 FOR OP/ED): Performed by: NURSE PRACTITIONER

## 2024-05-15 PROCEDURE — 6350000001 HC RX 635 EPOETIN >10,000 UNITS: Mod: JW | Performed by: INTERNAL MEDICINE

## 2024-05-15 PROCEDURE — 1200000002 HC GENERAL ROOM WITH TELEMETRY DAILY

## 2024-05-15 PROCEDURE — 2500000002 HC RX 250 W HCPCS SELF ADMINISTERED DRUGS (ALT 637 FOR MEDICARE OP, ALT 636 FOR OP/ED): Performed by: SURGERY

## 2024-05-15 PROCEDURE — 2500000004 HC RX 250 GENERAL PHARMACY W/ HCPCS (ALT 636 FOR OP/ED)

## 2024-05-15 PROCEDURE — 8010000001 HC DIALYSIS - HEMODIALYSIS PER DAY

## 2024-05-15 PROCEDURE — 2500000004 HC RX 250 GENERAL PHARMACY W/ HCPCS (ALT 636 FOR OP/ED): Performed by: INTERNAL MEDICINE

## 2024-05-15 PROCEDURE — 99231 SBSQ HOSP IP/OBS SF/LOW 25: CPT | Performed by: INTERNAL MEDICINE

## 2024-05-15 PROCEDURE — 82947 ASSAY GLUCOSE BLOOD QUANT: CPT

## 2024-05-15 PROCEDURE — 2500000001 HC RX 250 WO HCPCS SELF ADMINISTERED DRUGS (ALT 637 FOR MEDICARE OP): Performed by: FAMILY MEDICINE

## 2024-05-15 RX ORDER — VANCOMYCIN HYDROCHLORIDE 500 MG/100ML
500 INJECTION, SOLUTION INTRAVENOUS ONCE
Status: COMPLETED | OUTPATIENT
Start: 2024-05-15 | End: 2024-05-15

## 2024-05-15 RX ADMIN — SODIUM CHLORIDE 100 ML/HR: 9 INJECTION, SOLUTION INTRAVENOUS at 14:05

## 2024-05-15 RX ADMIN — OXYCODONE HYDROCHLORIDE 5 MG: 5 TABLET ORAL at 14:15

## 2024-05-15 RX ADMIN — INSULIN LISPRO 1 UNITS: 100 INJECTION, SOLUTION INTRAVENOUS; SUBCUTANEOUS at 17:55

## 2024-05-15 RX ADMIN — SEVELAMER CARBONATE 2400 MG: 800 TABLET, FILM COATED ORAL at 17:56

## 2024-05-15 RX ADMIN — SEVELAMER CARBONATE 2400 MG: 800 TABLET, FILM COATED ORAL at 13:08

## 2024-05-15 RX ADMIN — HEPARIN SODIUM 3100 UNITS: 1000 INJECTION INTRAVENOUS; SUBCUTANEOUS at 12:05

## 2024-05-15 RX ADMIN — OXYCODONE HYDROCHLORIDE 5 MG: 5 TABLET ORAL at 06:46

## 2024-05-15 RX ADMIN — INSULIN LISPRO 1 UNITS: 100 INJECTION, SOLUTION INTRAVENOUS; SUBCUTANEOUS at 13:08

## 2024-05-15 RX ADMIN — INSULIN GLARGINE 2 UNITS: 100 INJECTION, SOLUTION SUBCUTANEOUS at 22:10

## 2024-05-15 RX ADMIN — CYCLOBENZAPRINE HYDROCHLORIDE 5 MG: 5 TABLET, FILM COATED ORAL at 19:45

## 2024-05-15 RX ADMIN — VANCOMYCIN HYDROCHLORIDE 500 MG: 500 INJECTION, SOLUTION INTRAVENOUS at 18:21

## 2024-05-15 RX ADMIN — OXYCODONE HYDROCHLORIDE 5 MG: 5 TABLET ORAL at 19:46

## 2024-05-15 RX ADMIN — EPOETIN ALFA-EPBX 8000 UNITS: 10000 INJECTION, SOLUTION INTRAVENOUS; SUBCUTANEOUS at 18:20

## 2024-05-15 RX ADMIN — ATORVASTATIN CALCIUM 40 MG: 40 TABLET, FILM COATED ORAL at 19:45

## 2024-05-15 RX ADMIN — LEVOTHYROXINE SODIUM 125 MCG: 125 TABLET ORAL at 06:46

## 2024-05-15 RX ADMIN — PANTOPRAZOLE SODIUM 40 MG: 40 INJECTION, POWDER, FOR SOLUTION INTRAVENOUS at 19:45

## 2024-05-15 ASSESSMENT — COGNITIVE AND FUNCTIONAL STATUS - GENERAL
TOILETING: A LOT
WALKING IN HOSPITAL ROOM: TOTAL
CLIMB 3 TO 5 STEPS WITH RAILING: TOTAL
DRESSING REGULAR UPPER BODY CLOTHING: A LITTLE
WALKING IN HOSPITAL ROOM: TOTAL
MOBILITY SCORE: 13
DRESSING REGULAR LOWER BODY CLOTHING: TOTAL
HELP NEEDED FOR BATHING: TOTAL
DAILY ACTIVITIY SCORE: 13
TURNING FROM BACK TO SIDE WHILE IN FLAT BAD: A LITTLE
STANDING UP FROM CHAIR USING ARMS: TOTAL
MOVING TO AND FROM BED TO CHAIR: A LITTLE
CLIMB 3 TO 5 STEPS WITH RAILING: TOTAL
DAILY ACTIVITIY SCORE: 13
MOBILITY SCORE: 13
DRESSING REGULAR UPPER BODY CLOTHING: A LITTLE
TURNING FROM BACK TO SIDE WHILE IN FLAT BAD: A LITTLE
STANDING UP FROM CHAIR USING ARMS: TOTAL
PERSONAL GROOMING: A LOT
TOILETING: A LOT
DRESSING REGULAR LOWER BODY CLOTHING: TOTAL
PERSONAL GROOMING: A LOT
MOVING TO AND FROM BED TO CHAIR: A LITTLE
HELP NEEDED FOR BATHING: TOTAL

## 2024-05-15 ASSESSMENT — PAIN SCALES - GENERAL
PAINLEVEL_OUTOF10: 7
PAINLEVEL_OUTOF10: 7
PAINLEVEL_OUTOF10: 0 - NO PAIN

## 2024-05-15 ASSESSMENT — PAIN - FUNCTIONAL ASSESSMENT
PAIN_FUNCTIONAL_ASSESSMENT: 0-10
PAIN_FUNCTIONAL_ASSESSMENT: NO/DENIES PAIN

## 2024-05-15 ASSESSMENT — PAIN DESCRIPTION - LOCATION: LOCATION: BACK

## 2024-05-15 NOTE — PROCEDURES
Seen on dialysis.  3 potassium bath, attempting at least 1 L of fluid removal.  We have to wait another day before considering placing the tunneled dialysis line.  Negative POLI yesterday.  I spoke with Dr. Jauregui.  He is on erythropoietin, multifactorial anemia.

## 2024-05-15 NOTE — PROGRESS NOTES
"  INFECTIOUS DISEASE DAILY PROGRESS NOTE    SUBJECTIVE:    No overnight events. No new complaints. Afebrile. No rash/itching/diarrhea. Going down to dialysis this morning.    OBJECTIVE:  VITALS (Last 24 Hours)  BP 96/58 (BP Location: Right arm, Patient Position: Lying)   Pulse 99   Temp 36.3 °C (97.3 °F) (Axillary)   Resp 18   Ht 1.753 m (5' 9.02\")   Wt 74.9 kg (165 lb 2 oz)   SpO2 100%   BMI 24.37 kg/m²     PHYSICAL EXAM:  Gen - not in distress, laying in bed  Abd - soft, no tenderness, BS present  RLE - s/p AKA  RUE - s/p amputation  Skin - no rashes     ABX: IV Vancomycin    LABS:  Lab Results   Component Value Date    WBC 7.7 05/14/2024    HGB 7.8 (L) 05/14/2024    HCT 25.4 (L) 05/14/2024    MCV 92 05/14/2024     05/14/2024     Lab Results   Component Value Date    GLUCOSE 176 (H) 05/14/2024    CALCIUM 8.5 (L) 05/14/2024     (L) 05/14/2024    K 4.1 05/14/2024    CO2 21 05/14/2024    CL 94 (L) 05/14/2024    BUN 22 05/14/2024    CREATININE 4.47 (H) 05/14/2024     Results from last 72 hours   Lab Units 05/14/24  0625   ALBUMIN g/dL 2.8*     Estimated Creatinine Clearance: 22 mL/min (A) (by C-G formula based on SCr of 4.47 mg/dL (H)).    ASSESSMENT/PLAN:     CLABSI (HD cath POA) due to MRSA - line exchanged 5/3. Not having a full line holiday because of risk of losing HD access. TTE without endocarditis. Repeat blood cx 5/5 and 5/7 still positive. HD cath removed on 5/9. 5/10 blood cx still positive. New temp HD cath placed 5/13. POLI negative for endocarditis 5/14.  Lumbar Spine Pain - CT without epidural abscess. MRI without OM/discitis.  DM II with DKA - DKA resolved     MRSA HUMBERTO for Vancomycin is 1. Dapto/Ceftaroline are susceptible.      IV Vancomycin still. If latest set of blood cx are positive still will try dual abx therapy. So far NGTD but early. If clear for 48H can have new tunneled HD cath placed - earliest would be tomorrow.     Monitoring for adverse effects of abx such as " rash/itching/diarrhea - none apparent.     Will follow. Thanks! Partners to cover starting tomorrow. D/w Dr. Estrada and Carlos Jauregui MD  [facility]  Office #[phone]

## 2024-05-15 NOTE — PROGRESS NOTES
"Edwar Hemphill is a 39 y.o. male on day 15 of admission presenting with SIRS (systemic inflammatory response syndrome) (Multi).    Subjective   No new complaint  He did have Lantus 2 units last night     Scheduled medications  [Held by provider] apixaban, 5 mg, oral, BID  atorvastatin, 40 mg, oral, Nightly  B complex-vitamin C-folic acid, 1 capsule, oral, Daily  epoetin tyson or biosimilar, 100 Units/kg, subcutaneous, Every Mon/Wed/Fri  heparin, 3,100 Units, intra-catheter, After Dialysis  heparin, 3,100 Units, intra-catheter, After Dialysis  insulin glargine, 2 Units, subcutaneous, Nightly  insulin lispro, 0-5 Units, subcutaneous, TID  levothyroxine, 125 mcg, oral, Daily before breakfast  pantoprazole, 40 mg, intravenous, BID  polyethylene glycol, 17 g, oral, Daily  sennosides, 8.6 mg, oral, BID  sevelamer carbonate, 2,400 mg, oral, TID  sodium zirconium cyclosilicate, 10 g, oral, Daily        Objective   Physical Exam  Constitutional:       General: He is not in acute distress.  Neurological:      Mental Status: He is alert.         Last Recorded Vitals  Blood pressure 96/58, pulse 99, temperature 36.3 °C (97.3 °F), temperature source Axillary, resp. rate 18, height 1.753 m (5' 9.02\"), weight 74.9 kg (165 lb 2 oz), SpO2 100%.  Intake/Output last 3 Shifts:  I/O last 3 completed shifts:  In: 1240 (16.6 mL/kg) [I.V.:840 (11.2 mL/kg); Other:400]  Out: 1400 (18.7 mL/kg) [Other:1400]  Weight: 74.9 kg     Relevant Results  Results from last 7 days   Lab Units 05/14/24  2217 05/14/24  1531 05/14/24  1211 05/14/24  0831 05/14/24  0625 05/13/24  2351 05/12/24  0754 05/12/24  0710 05/11/24  0805 05/11/24  0631 05/10/24  0744 05/10/24  0527   POCT GLUCOSE mg/dL 241* 218* 225* 209*  --  104*   < >  --    < >  --    < >  --    GLUCOSE mg/dL  --   --   --   --  176*  --   --  164*  --  122*  --  206*    < > = values in this interval not displayed.         Assessment/Plan   Principal Problem:    SIRS (systemic inflammatory response " syndrome) (Multi)      IMPRESSION  DIABETIC KETOACIDOSIS, RESOLVED  TYPE 2 DIABETES MELLITUS  LONG TERM CURRENT INSULIN USE  Glucose rising last night  He did accept glargine 2 units last night       RECOMMENDATIONS  Recomend insulin glargine 2 units at bedtime, for now  Insulin lispro scale QAC      Mike Santana MD

## 2024-05-15 NOTE — PROGRESS NOTES
Report to Receiving RN:    Report To: Arti Rich (Secure Message)  Time Report Called: 1230  Hand-Off Communication: pt tolerated tx well, took off 2 L of fluid, post /88   Complications During Treatment: No  Ultrafiltration Treatment: No  Medications Administered During Dialysis: No  Blood Products Administered During Dialysis: No  Labs Sent During Dialysis: No  Heparin Drip Rate Changes: No  Dialysis Catheter Dressing: C/D/I  Last Dressing Change: 5/13/2024    Electronic Signatures:  Audie Augustine RN (Signed )   Authored:  (Signed )   Authored:     Last Updated: 1:04 PM by AUDIE AUGUSTINE

## 2024-05-15 NOTE — PROGRESS NOTES
Xiomara Hemphill is a 39 y.o. male on day 15 of admission presenting with SIRS (systemic inflammatory response syndrome) (Multi).      Subjective   Seen at HD        Objective     Last Recorded Vitals  BP 96/58 (BP Location: Right arm, Patient Position: Lying)   Pulse (!) 111   Temp 36.7 °C (98.1 °F) (Temporal)   Resp 18   Wt 74.9 kg (165 lb 2 oz)   SpO2 100%   Intake/Output last 3 Shifts:    Intake/Output Summary (Last 24 hours) at 5/15/2024 0933  Last data filed at 5/15/2024 0900  Gross per 24 hour   Intake 440 ml   Output 0 ml   Net 440 ml       Admission Weight  Weight: 65.8 kg (145 lb) (04/30/24 0825)    Daily Weight  05/15/24 : 74.9 kg (165 lb 2 oz)    Image Results  Transesophageal Echo (POLI)     [facility], [address] [place] [address]               Tel [phone] and Fax [phone]    TRANSESOPHAGEAL ECHOCARDIOGRAM REPORT       Patient Name:      XIOMARA HEMPHILL        Reading Physician:    [id number] Hernandez DO  Study Date:        5/14/2024           Ordering Provider:    [id number] ALKA VIEYRA  MRN/PID:           [id number]            Fellow:  Accession#:        [id number]        Nurse:                Ronaldo Woo RN  Date of Birth/Age: 1985 / 39      Sonographer:          Alejandro Carroll RDCS                     years  Gender:            M                   Additional Staff:     Kayden Matthews CRNA  Height:            175.26 cm           Admit Date:           4/30/2024  Weight:            72.58 kg            Admission Status:     Inpatient -                                                               Routine  BSA / BMI:         1.88 m2 / 23.63     Encounter#:           [id number]                     kg/m2                                         Department Location:  [facility]                                                                Invasive  Blood Pressure: 116 /69 mmHg    Study Type:    TRANSESOPHAGEAL ECHO (POLI)  Diagnosis/ICD: Bacteremia-R78.81  Indication:    Staphyococcus aureus bacteremia, R/o endocarditis  CPT Code:      POLI Complete-06953; Doppler Limited-05799; Color Doppler-95219    Patient History:  Allergies:         Cashew nuts.  Smoker:            Unknown.  Diabetes:          Yes Insulin  Pertinent History: HTN, Hyperlipidemia and PVD. 39 y.o. male presents for POLI                     for endocarditis rule out.                        PMH of ESRD and SIRS.    Study Detail: The following Echo studies were performed: 2D. Agitated saline                used as a contrast agent for intraseptal flow evaluation. Total                contrast used for this procedure was 10 mL via IV push. Patient's                heart rhythm is sinus tachycardia. The patient was sedated.       PHYSICIAN INTERPRETATION:  POLI Details: The POLI probe used was 5177. Technically adequate omniplane transesophageal echocardiogram performed.  POLI Medication: The pharynx was anesthetized with Cetacaine spray and viscous lidocaine. The patient was sedated by Anesthesia; please refer to anesthesia flow sheet for medications used.  POLI Procedure: The probe was passed without difficulty.  Left Ventricle: The left ventricular systolic function is mildly decreased, with an estimated ejection fraction of 35-40%. Wall motion is abnormal. The left ventricular cavity size was not assessed. Left ventricular diastolic filling was not assessed.  Left Atrium: The left atrium is normal in size. There is no definite left atrial thrombus present. A bubble study using agitated saline was performed. Bubble study is negative. There is a normal sized left atrial appendage.  Right Ventricle: The right ventricle is normal in size. There is normal right ventricular global systolic function.  Right Atrium: The right atrium is normal in size.  Aortic Valve: There is no visualized aortic  valve vegetation. The aortic valve appears structurally normal. There is no evidence of aortic valve regurgitation.  Mitral Valve: The mitral valve is normal in structure. There was no mitral valve vegetation visualized. There is trace mitral valve regurgitation.  Tricuspid Valve: No vegetation is seen on the tricuspid valve. The tricuspid valve is structurally normal. There is trace tricuspid regurgitation.  Pulmonic Valve: No pulmonic valve vegetation visualized. The pulmonic valve is structurally normal. There is no indication of pulmonic valve regurgitation.  Pericardium: There is no pericardial effusion noted.  Aorta: The aortic root is normal.       CONCLUSIONS:   1. Left ventricular systolic function is mildly decreased with a 35-40% estimated ejection fraction.   2. No mitral valve vegetation visualized.   3. No tricuspid valve vegetation.   4. No aortic valve vegetation visualized.   5. No pulmonic valve vegetation visualized.   6. No left atrial thrombus.    QUANTITATIVE DATA SUMMARY:     [id number] Edward Hernandez DO  Electronically signed on 5/14/2024 at 7:06:59 PM       ** Final **      PHYSICAL EXAM  NEUROLOGICAL: No abnormal movements, no changes from before  HEENT: Head atraumatic, perrl,eomi, no oral lesions, no ear rash   NECK: Supple, no ln, jvp flat, thyroid palp  HEART: S1, S2, no added sound  LUNGS: CTAB, no crackles, no rales, no wheezing, no dullness to percussion, sym exp  EXTREMITIES: no edema  ABDOMEN: Soft, nontender, bowel sound positive, no organomegaly  SKIN: No change  JOINT: No change  Relevant Results               Assessment/Plan                  Principal Problem:    SIRS (systemic inflammatory response syndrome) (Multi)    Esrd  Anemia   T2dm  Oa  Pvd    I d/w ID and renal   Waiting for repeat bc and then tunnelled cath if neg and then dc     t32      Malnutrition Diagnosis Status: New  Malnutrition Diagnosis: Severe malnutrition related to chronic disease or condition  As Evidenced  by: 18% weight loss over 8 months, intake <75% meals for > 1 month.  I agree with the dietitian's malnutrition diagnosis.         Mauricio Estrada MD

## 2024-05-15 NOTE — PROGRESS NOTES
"Vancomycin Dosing by Pharmacy- FOLLOW UP    Edwar Hemphill is a 39 y.o. year old male who Pharmacy has been consulted for vancomycin dosing for other bacteremia . Based on the patient's indication and renal status this patient is being dosed based on a goal pre-HD level of 15-25.     Renal function is currently stable.    Current vancomycin dose: dosing based on pre-HD levels    Most recent trough level: 24 mcg/mL    Visit Vitals  /81   Pulse 73   Temp 36.9 °C (98.5 °F)   Resp 18        Lab Results   Component Value Date    CREATININE 4.47 (H) 05/14/2024    CREATININE 7.61 (H) 05/12/2024    CREATININE 6.99 (H) 05/11/2024    CREATININE 5.78 (H) 05/10/2024        Patient weight is No results found for: \"PTWEIGHT\"    No results found for: \"CULTURE\"     I/O last 3 completed shifts:  In: 1240 (16.6 mL/kg) [I.V.:840 (11.2 mL/kg); Other:400]  Out: 1400 (18.7 mL/kg) [Other:1400]  Weight: 74.9 kg   [unfilled]    Lab Results   Component Value Date    PATIENTTEMP 37.0 05/04/2024    PATIENTTEMP 37.0 01/14/2024    PATIENTTEMP 37.0 07/09/2023    PATIENTTEMP 37.0 07/08/2023    PATIENTTEMP 37.0 07/08/2023        Assessment/Plan    Within goal random/trough level; vanco 500 mg x1 dose ordered for tonight  The next level will be obtained on 5/17 with am labs, prior to HD. May be obtained sooner if clinically indicated.   Will continue to monitor renal function daily while on vancomycin and order serum creatinine at least every 48 hours if not already ordered.  Follow for continued vancomycin needs, clinical response, and signs/symptoms of toxicity.       Noemí Hernandez RP           "

## 2024-05-15 NOTE — PROGRESS NOTES
Report from Sending RN:    Report From: Arti Rich  Recent Surgery of Procedure: No  Baseline Level of Consciousness (LOC): AOx4  Oxygen Use: Yes  Type: NC  Diabetic: No  Last BP Med Given Day of Dialysis: See EMAR  Last Pain Med Given: See EMAR  Lab Tests to be Obtained with Dialysis: No  Blood Transfusion to be Given During Dialysis: No  Available IV Access: Yes  Medications to be Administered During Dialysis: No  Continuous IV Infusion Running: No  Restraints on Currently or in the Last 24 Hours: No  Hand-Off Communication: Pt stable and able to come to PACU for tx  Dialysis Catheter Dressing: Will assess  Last Dressing Change: Will assess

## 2024-05-15 NOTE — NURSING NOTE
Blood cultures called back positive by lab. Dr. Estrada paged X2 with no answer. Earnest answering service called, and message of BC results and patient info left with answering . Dr. Estrada reached and results relayed.

## 2024-05-16 LAB
ERYTHROCYTE [DISTWIDTH] IN BLOOD BY AUTOMATED COUNT: 18.2 % (ref 11.5–14.5)
GLUCOSE BLD MANUAL STRIP-MCNC: 103 MG/DL (ref 74–99)
GLUCOSE BLD MANUAL STRIP-MCNC: 114 MG/DL (ref 74–99)
GLUCOSE BLD MANUAL STRIP-MCNC: 162 MG/DL (ref 74–99)
GLUCOSE BLD MANUAL STRIP-MCNC: 195 MG/DL (ref 74–99)
HCT VFR BLD AUTO: 25.3 % (ref 41–52)
HGB BLD-MCNC: 7.5 G/DL (ref 13.5–17.5)
HOLD SPECIMEN: NORMAL
MCH RBC QN AUTO: 28.2 PG (ref 26–34)
MCHC RBC AUTO-ENTMCNC: 29.6 G/DL (ref 32–36)
MCV RBC AUTO: 95 FL (ref 80–100)
NRBC BLD-RTO: 0 /100 WBCS (ref 0–0)
PLATELET # BLD AUTO: 251 X10*3/UL (ref 150–450)
RBC # BLD AUTO: 2.66 X10*6/UL (ref 4.5–5.9)
WBC # BLD AUTO: 6.6 X10*3/UL (ref 4.4–11.3)

## 2024-05-16 PROCEDURE — 36415 COLL VENOUS BLD VENIPUNCTURE: CPT | Performed by: INTERNAL MEDICINE

## 2024-05-16 PROCEDURE — 99231 SBSQ HOSP IP/OBS SF/LOW 25: CPT | Performed by: INTERNAL MEDICINE

## 2024-05-16 PROCEDURE — 2500000001 HC RX 250 WO HCPCS SELF ADMINISTERED DRUGS (ALT 637 FOR MEDICARE OP): Performed by: NURSE PRACTITIONER

## 2024-05-16 PROCEDURE — 2500000004 HC RX 250 GENERAL PHARMACY W/ HCPCS (ALT 636 FOR OP/ED): Performed by: INTERNAL MEDICINE

## 2024-05-16 PROCEDURE — 2500000001 HC RX 250 WO HCPCS SELF ADMINISTERED DRUGS (ALT 637 FOR MEDICARE OP): Performed by: FAMILY MEDICINE

## 2024-05-16 PROCEDURE — 2500000006 HC RX 250 W HCPCS SELF ADMINISTERED DRUGS (ALT 637 FOR ALL PAYERS): Performed by: NURSE PRACTITIONER

## 2024-05-16 PROCEDURE — 2500000002 HC RX 250 W HCPCS SELF ADMINISTERED DRUGS (ALT 637 FOR MEDICARE OP, ALT 636 FOR OP/ED): Performed by: SURGERY

## 2024-05-16 PROCEDURE — 85027 COMPLETE CBC AUTOMATED: CPT | Performed by: INTERNAL MEDICINE

## 2024-05-16 PROCEDURE — 2500000004 HC RX 250 GENERAL PHARMACY W/ HCPCS (ALT 636 FOR OP/ED): Performed by: NURSE PRACTITIONER

## 2024-05-16 PROCEDURE — 1200000002 HC GENERAL ROOM WITH TELEMETRY DAILY

## 2024-05-16 PROCEDURE — 82947 ASSAY GLUCOSE BLOOD QUANT: CPT

## 2024-05-16 PROCEDURE — 2500000002 HC RX 250 W HCPCS SELF ADMINISTERED DRUGS (ALT 637 FOR MEDICARE OP, ALT 636 FOR OP/ED): Performed by: INTERNAL MEDICINE

## 2024-05-16 PROCEDURE — 2500000004 HC RX 250 GENERAL PHARMACY W/ HCPCS (ALT 636 FOR OP/ED)

## 2024-05-16 PROCEDURE — C9113 INJ PANTOPRAZOLE SODIUM, VIA: HCPCS

## 2024-05-16 RX ORDER — DAPTOMYCIN IN SODIUM CHLORIDE 700 MG/100ML
700 INJECTION, SOLUTION INTRAVENOUS
Status: DISCONTINUED | OUTPATIENT
Start: 2024-05-16 | End: 2024-05-26 | Stop reason: HOSPADM

## 2024-05-16 RX ADMIN — OXYCODONE HYDROCHLORIDE 5 MG: 5 TABLET ORAL at 04:24

## 2024-05-16 RX ADMIN — INSULIN GLARGINE 2 UNITS: 100 INJECTION, SOLUTION SUBCUTANEOUS at 21:55

## 2024-05-16 RX ADMIN — CYCLOBENZAPRINE HYDROCHLORIDE 5 MG: 5 TABLET, FILM COATED ORAL at 20:35

## 2024-05-16 RX ADMIN — OXYCODONE HYDROCHLORIDE 5 MG: 5 TABLET ORAL at 14:17

## 2024-05-16 RX ADMIN — OXYCODONE HYDROCHLORIDE 5 MG: 5 TABLET ORAL at 20:35

## 2024-05-16 RX ADMIN — INSULIN LISPRO 1 UNITS: 100 INJECTION, SOLUTION INTRAVENOUS; SUBCUTANEOUS at 09:56

## 2024-05-16 RX ADMIN — SEVELAMER CARBONATE 2400 MG: 800 TABLET, FILM COATED ORAL at 14:12

## 2024-05-16 RX ADMIN — CEFTAROLINE FOSAMIL 200 MG: 600 POWDER, FOR SOLUTION INTRAVENOUS at 20:36

## 2024-05-16 RX ADMIN — CEFTAROLINE FOSAMIL 200 MG: 600 POWDER, FOR SOLUTION INTRAVENOUS at 12:19

## 2024-05-16 RX ADMIN — DAPTOMYCIN IN SODIUM CHLORIDE 700 MG: 700 INJECTION, SOLUTION INTRAVENOUS at 18:33

## 2024-05-16 RX ADMIN — PANTOPRAZOLE SODIUM 40 MG: 40 INJECTION, POWDER, FOR SOLUTION INTRAVENOUS at 21:54

## 2024-05-16 RX ADMIN — PANTOPRAZOLE SODIUM 40 MG: 40 INJECTION, POWDER, FOR SOLUTION INTRAVENOUS at 09:56

## 2024-05-16 RX ADMIN — NEPHROCAP 1 CAPSULE: 1 CAP ORAL at 09:56

## 2024-05-16 RX ADMIN — LEVOTHYROXINE SODIUM 125 MCG: 125 TABLET ORAL at 07:34

## 2024-05-16 RX ADMIN — SODIUM CHLORIDE 100 ML/HR: 9 INJECTION, SOLUTION INTRAVENOUS at 04:24

## 2024-05-16 ASSESSMENT — COGNITIVE AND FUNCTIONAL STATUS - GENERAL
DAILY ACTIVITIY SCORE: 14
TURNING FROM BACK TO SIDE WHILE IN FLAT BAD: A LITTLE
STANDING UP FROM CHAIR USING ARMS: A LOT
DRESSING REGULAR UPPER BODY CLOTHING: A LOT
HELP NEEDED FOR BATHING: A LOT
CLIMB 3 TO 5 STEPS WITH RAILING: TOTAL
MOVING FROM LYING ON BACK TO SITTING ON SIDE OF FLAT BED WITH BEDRAILS: A LITTLE
MOVING TO AND FROM BED TO CHAIR: A LOT
TOILETING: A LOT
PERSONAL GROOMING: A LOT
DRESSING REGULAR LOWER BODY CLOTHING: A LOT
WALKING IN HOSPITAL ROOM: TOTAL
MOBILITY SCORE: 12

## 2024-05-16 ASSESSMENT — PAIN SCALES - GENERAL
PAINLEVEL_OUTOF10: 7
PAINLEVEL_OUTOF10: 8
PAINLEVEL_OUTOF10: 8
PAINLEVEL_OUTOF10: 4

## 2024-05-16 ASSESSMENT — PAIN - FUNCTIONAL ASSESSMENT: PAIN_FUNCTIONAL_ASSESSMENT: 0-10

## 2024-05-16 ASSESSMENT — PAIN SCALES - PAIN ASSESSMENT IN ADVANCED DEMENTIA (PAINAD): TOTALSCORE: MEDICATION (SEE MAR)

## 2024-05-16 ASSESSMENT — PAIN DESCRIPTION - ORIENTATION: ORIENTATION: RIGHT

## 2024-05-16 NOTE — PROGRESS NOTES
05/16/24 1206   Discharge Planning   Patient expects to be discharged to: [facility]     Patient not med ready, still has multiple consults following, on IV Teflaro, IV Vanc and IV Cubicin, still needs new tunnel HD Cath once med ready patient can return to [facility].

## 2024-05-16 NOTE — PROGRESS NOTES
Vancomycin Dosing by Pharmacy- Cessation of Therapy    Consult to pharmacy for vancomycin dosing has been discontinued by the prescriber, pharmacy will sign off at this time.    Please call pharmacy if there are further questions or re-enter a consult if vancomycin is resumed.     Dori Flores, PharmD

## 2024-05-16 NOTE — PROGRESS NOTES
"Edwar Hemphill is a 39 y.o. male on day 16 of admission presenting with SIRS (systemic inflammatory response syndrome) (Multi).    Subjective   No new complaint    Scheduled medications  [Held by provider] apixaban, 5 mg, oral, BID  atorvastatin, 40 mg, oral, Nightly  B complex-vitamin C-folic acid, 1 capsule, oral, Daily  epoetin tyson or biosimilar, 100 Units/kg, subcutaneous, Every Mon/Wed/Fri  heparin, 3,100 Units, intra-catheter, After Dialysis  heparin, 3,100 Units, intra-catheter, After Dialysis  insulin glargine, 2 Units, subcutaneous, Nightly  insulin lispro, 0-5 Units, subcutaneous, TID  levothyroxine, 125 mcg, oral, Daily before breakfast  pantoprazole, 40 mg, intravenous, BID  polyethylene glycol, 17 g, oral, Daily  sennosides, 8.6 mg, oral, BID  sevelamer carbonate, 2,400 mg, oral, TID  sodium zirconium cyclosilicate, 10 g, oral, Daily      Continuous medications  oxygen, , Last Rate: 2 L/min (05/03/24 1758)  sodium chloride 0.9%, 100 mL/hr, Last Rate: 100 mL/hr (05/16/24 0424)      Objective   Physical Exam  Constitutional:       General: He is not in acute distress.  Neurological:      Mental Status: He is alert.         Last Recorded Vitals  Blood pressure 106/68, pulse 100, temperature 36.7 °C (98.1 °F), temperature source Oral, resp. rate 18, height 1.753 m (5' 9.02\"), weight 74.9 kg (165 lb 2 oz), SpO2 94%.  Intake/Output last 3 Shifts:  I/O last 3 completed shifts:  In: 2480 (33.1 mL/kg) [I.V.:2080 (27.8 mL/kg); Other:400]  Out: 2200 (29.4 mL/kg) [Other:2200]  Weight: 74.9 kg     Relevant Results  Results from last 7 days   Lab Units 05/15/24  1954 05/15/24  1627 05/15/24  1602 05/15/24  1257 05/14/24  2217 05/14/24  0831 05/14/24  0625 05/12/24  0754 05/12/24  0710 05/11/24  0805 05/11/24  0631 05/10/24  0744 05/10/24  0527   POCT GLUCOSE mg/dL 221* 166* 177* 200* 241*   < >  --    < >  --    < >  --    < >  --    GLUCOSE mg/dL  --   --   --   --   --   --  176*  --  164*  --  122*  --  206*    < > " = values in this interval not displayed.     Pump Settings    Assessment/Plan   Principal Problem:    SIRS (systemic inflammatory response syndrome) (Multi)    IMPRESSION  DIABETIC KETOACIDOSIS, RESOLVED  TYPE 2 DIABETES MELLITUS  LONG TERM CURRENT INSULIN USE      RECOMMENDATIONS  Recomend insulin glargine 2 units at bedtime, for now  Insulin lispro scale QAC        Mike Santana MD

## 2024-05-16 NOTE — PROGRESS NOTES
Xiomara Hemphill is a 39 y.o. male on day 16 of admission presenting with SIRS (systemic inflammatory response syndrome) (Multi).      Subjective   No complains  Anxious to go back to f       Objective     Last Recorded Vitals  /80 (Patient Position: Lying)   Pulse 100   Temp 36.6 °C (97.9 °F) (Oral)   Resp 18   Wt 74.9 kg (165 lb 2 oz)   SpO2 99%   Intake/Output last 3 Shifts:    Intake/Output Summary (Last 24 hours) at 5/16/2024 1111  Last data filed at 5/16/2024 0229  Gross per 24 hour   Intake 2040 ml   Output 2200 ml   Net -160 ml       Admission Weight  Weight: 65.8 kg (145 lb) (04/30/24 0825)    Daily Weight  05/15/24 : 74.9 kg (165 lb 2 oz)    Image Results  Transesophageal Echo (POLI)     [facility], [address] [place] [address]               Tel [phone] and Fax [phone]    TRANSESOPHAGEAL ECHOCARDIOGRAM REPORT       Patient Name:      XIOMARA HEMPHILL        Reading Physician:    [id number] Hernandez DO  Study Date:        5/14/2024           Ordering Provider:    [id number] ALKA VIEYRA  MRN/PID:           [id number]            Fellow:  Accession#:        [id number]        Nurse:                Ronaldo Woo RN  Date of Birth/Age: 1985 / 39      Sonographer:          Alejandro Carroll RDCS                     years  Gender:            M                   Additional Staff:     Kayden Matthews CRNA  Height:            175.26 cm           Admit Date:           4/30/2024  Weight:            72.58 kg            Admission Status:     Inpatient -                                                               Routine  BSA / BMI:         1.88 m2 / 23.63     Encounter#:           [id number]                     kg/m2                                         Department Location:  [facility]                                                                Invasive  Blood Pressure: 116 /69 mmHg    Study Type:    TRANSESOPHAGEAL ECHO (POLI)  Diagnosis/ICD: Bacteremia-R78.81  Indication:    Staphyococcus aureus bacteremia, R/o endocarditis  CPT Code:      POLI Complete-08488; Doppler Limited-89069; Color Doppler-80490    Patient History:  Allergies:         Cashew nuts.  Smoker:            Unknown.  Diabetes:          Yes Insulin  Pertinent History: HTN, Hyperlipidemia and PVD. 39 y.o. male presents for POLI                     for endocarditis rule out.                        PMH of ESRD and SIRS.    Study Detail: The following Echo studies were performed: 2D. Agitated saline                used as a contrast agent for intraseptal flow evaluation. Total                contrast used for this procedure was 10 mL via IV push. Patient's                heart rhythm is sinus tachycardia. The patient was sedated.       PHYSICIAN INTERPRETATION:  POLI Details: The POLI probe used was 5177. Technically adequate omniplane transesophageal echocardiogram performed.  POLI Medication: The pharynx was anesthetized with Cetacaine spray and viscous lidocaine. The patient was sedated by Anesthesia; please refer to anesthesia flow sheet for medications used.  POLI Procedure: The probe was passed without difficulty.  Left Ventricle: The left ventricular systolic function is mildly decreased, with an estimated ejection fraction of 35-40%. Wall motion is abnormal. The left ventricular cavity size was not assessed. Left ventricular diastolic filling was not assessed.  Left Atrium: The left atrium is normal in size. There is no definite left atrial thrombus present. A bubble study using agitated saline was performed. Bubble study is negative. There is a normal sized left atrial appendage.  Right Ventricle: The right ventricle is normal in size. There is normal right ventricular global systolic function.  Right Atrium: The right atrium is normal in size.  Aortic Valve: There is no visualized aortic  valve vegetation. The aortic valve appears structurally normal. There is no evidence of aortic valve regurgitation.  Mitral Valve: The mitral valve is normal in structure. There was no mitral valve vegetation visualized. There is trace mitral valve regurgitation.  Tricuspid Valve: No vegetation is seen on the tricuspid valve. The tricuspid valve is structurally normal. There is trace tricuspid regurgitation.  Pulmonic Valve: No pulmonic valve vegetation visualized. The pulmonic valve is structurally normal. There is no indication of pulmonic valve regurgitation.  Pericardium: There is no pericardial effusion noted.  Aorta: The aortic root is normal.       CONCLUSIONS:   1. Left ventricular systolic function is mildly decreased with a 35-40% estimated ejection fraction.   2. No mitral valve vegetation visualized.   3. No tricuspid valve vegetation.   4. No aortic valve vegetation visualized.   5. No pulmonic valve vegetation visualized.   6. No left atrial thrombus.    QUANTITATIVE DATA SUMMARY:     [id number] Edward Hernandez DO  Electronically signed on 5/14/2024 at 7:06:59 PM       ** Final **      PHYSICAL EXAM  NEUROLOGICAL: No abnormal movements, no changes from before  HEENT: Head atraumatic, perrl,eomi, no oral lesions, no ear rash   NECK: Supple, no ln, jvp flat, thyroid palp  HEART: S1, S2, no added sound  LUNGS: CTAB, no crackles, no rales, no wheezing, no dullness to percussion, sym exp  EXTREMITIES: no edema  ABDOMEN: Soft, nontender, bowel sound positive, no organomegaly  SKIN: No change  JOINT: No change  Relevant Results               Assessment/Plan        This patient has a central line   Reason for the central line remaining today? Dialysis/Hemapheresis            Principal Problem:    SIRS (systemic inflammatory response syndrome) (Multi)    Esrd  + bc from 5/14--on 3 antibiotics, bc post HD in am   Anemia renal origin   t2dm    Anticipate dc 5/20 or 5/21 if repeat bc from 5/17 is negative        Malnutrition Diagnosis Status: New  Malnutrition Diagnosis: Severe malnutrition related to chronic disease or condition  As Evidenced by: 18% weight loss over 8 months, intake <75% meals for > 1 month.  I agree with the dietitian's malnutrition diagnosis.     t32    Mauricio Estrada MD

## 2024-05-16 NOTE — DOCUMENTATION CLARIFICATION NOTE
"    PATIENT:               XIOMARA FERGUSON  ACCT #:                  [id number]  MRN:                       [id number]  :                       1985  ADMIT DATE:       2024 1:30 PM  DISCH DATE:  RESPONDING PROVIDER #:        [id number]          PROVIDER RESPONSE TEXT:    I agree and support the 24 nutrition assessment diagnosis of Severe Protein Calorie Malnutrition    CDI QUERY TEXT:    Clarification      Instruction:    Based on your assessment of the patient and the clinical information, please provide the requested documentation by clicking on the appropriate radio button and enter any additional information if prompted.    Question: Please further clarify this patient nutritional status as    When answering this query, please exercise your independent professional judgment. The fact that a question is being asked, does not imply that any particular answer is desired or expected.    The patient's clinical indicators include:  Clinical Information: Patient admitted  with Sepsis 2/2CLABSI due to MRSA and DKA    Clinical Indicators:    -24 Nutrition assessment consult note: Malnutrition Diagnosis: \"Severe malnutrition related to chronic disease or condition As Evidenced by: 18 percent weight loss over 8 months, intake greater than 75 percent meals for greater than 1 month.\"      Treatment: Per nutrition assessment note: \"Continue diet as ordered, moniotr RFP and add mineral restrictions as appropriate.  Patient defers nutrition supplements at this time.  Daily renal MVI.  Marc BID.\"    Risk Factors: acute and chronic illness  Options provided:  -- I agree and support the 24 nutrition assessment diagnosis of Severe Protein Calorie Malnutrition  -- Protein Calorie Malnutrition, Please specify severity  -- Other - I will add my own diagnosis  -- Refer to Clinical Documentation Reviewer    Query created by: Veronica Guerra on 5/15/2024 4:19 PM      Electronically signed by:  HEAVENLY LOCKHART MD 2024 " 11:44 AM

## 2024-05-16 NOTE — PROGRESS NOTES
"                                 '' Infectious Disease Progress Note''        Interval Events:  No new symptoms  Complaining of right flank pain  Blood culture from 5/14 growing Staph aureus      Current antibiotic:  Vancomycin    Physical Exam:  /65 (BP Location: Left arm, Patient Position: Lying)   Pulse 99   Temp 36.5 °C (97.7 °F) (Oral)   Resp 16   Ht 1.753 m (5' 9.02\")   Wt 74.9 kg (165 lb 2 oz)   SpO2 98%   BMI 24.37 kg/m²   General: NAD, nontoxic appearing  Skin: no new rash  Resp: lungs CTA b/l  CV:  normal S1/S2, no murmur   Abd: soft, non-tender  Ext: no edema,+ R groin TDC, s/p R AKA and RUE amputation      Lab Results:  Reviewed    Micro:  5/7 blood culture: MRSA  5/10 blood culture: MRSA  5/14 blood culture: MRSA      Imaging: reviewed         Assessment:  -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 Not having a full line holiday because of risk of losing HD access. TTE without endocarditis. Repeat blood cx 5/5 and 5/7 still positive. HD cath removed on 5/9. 5/10 blood cx still positive. New temp HD cath placed 5/13. POLI negative for endocarditis 5/14.      -Persistent MRSA bacteremia  -Lumbar spine pain   MRI without VO/discitis  -Diabetes with DKA  -ESRD on HD (MWF)      Plan/Recommendations:  -Vancomycin  -Daptomycin and ceftaroline as salvage therapy for persistent MRSA bacteremia  -Repeat blood culture tomorrow after dialysis  -Monitoring for adverse reaction of antibiotics such as rash/itching/diarrhea.      Please call ID with any concerns or questions.  Discussed with patient and pharmacy.    Sheldon Saleh MD  [facility]  #[phone]      "

## 2024-05-16 NOTE — PROGRESS NOTES
Edwar Hemphill is a 39 y.o. male on day 16 of admission presenting with SIRS (systemic inflammatory response syndrome) (Multi).      Subjective   Patient was seen, examined and evaluated today.  Overall doing better with no new complaints hemodialysis was completed without any side effects or complications.       Objective        Vitals 24HR  Heart Rate:  []   Temp:  [36.5 °C (97.7 °F)-36.9 °C (98.5 °F)]   Resp:  [16-18]   BP: ()/(62-89)   SpO2:  [94 %-100 %]     Intake/Output last 3 Shifts:    Intake/Output Summary (Last 24 hours) at 5/16/2024 1150  Last data filed at 5/16/2024 0229  Gross per 24 hour   Intake 2040 ml   Output 2200 ml   Net -160 ml       Physical Exam      GEN appearance; awake and alert no acute distress  Head and ENT; normocephalic/atraumatic/PERRL neck/JVD  Lungs: CTA  Heart; RRR  Abdomen; soft no tenderness  Extremities; right upper extremity amputation, left AKA  Neurologic; physiologic        Dialysis access= right groin temporary hemodialysis catheter.            Relevant Results     Results from last 7 days   Lab Units 05/16/24  1115 05/14/24  0625 05/11/24  0631   WBC AUTO x10*3/uL 6.6 7.7 7.0   HEMOGLOBIN g/dL 7.5* 7.8* 8.2*   HEMATOCRIT % 25.3* 25.4* 27.1*   PLATELETS AUTO x10*3/uL 251 338 304      Results from last 7 days   Lab Units 05/14/24  0625 05/12/24  0710 05/11/24  0631   SODIUM mmol/L 134* 130* 132*   POTASSIUM mmol/L 4.1 5.9* 5.0   CHLORIDE mmol/L 94* 94* 94*   CO2 mmol/L 21 26 26   BUN mg/dL 22 53* 51*   CREATININE mg/dL 4.47* 7.61* 6.99*   GLUCOSE mg/dL 176* 164* 122*   CALCIUM mg/dL 8.5* 9.2 9.6     Results from last 7 days   Lab Units 05/14/24  0625 05/12/24  0710 05/11/24  0631   SODIUM mmol/L 134* 130* 132*   POTASSIUM mmol/L 4.1 5.9* 5.0   CHLORIDE mmol/L 94* 94* 94*   CO2 mmol/L 21 26 26   BUN mg/dL 22 53* 51*   CREATININE mg/dL 4.47* 7.61* 6.99*   GLUCOSE mg/dL 176* 164* 122*   CALCIUM mg/dL 8.5* 9.2 9.6    Transesophageal Echo (POLI)    Result Date:  5/14/2024   [facility], [address] [place] [address]              Tel [phone] and Fax [phone] TRANSESOPHAGEAL ECHOCARDIOGRAM REPORT  Patient Name:      XIOMARA CASSANDRA Maya Physician:    [id number] Edward Hernandez DO Study Date:        5/14/2024           Ordering Provider:    [id number] ALKA VIEYRA MRN/PID:           [id number]            Fellow: Accession#:        [id number]        Nurse:                Ronaldo Woo RN Date of Birth/Age: 1985 / 39      Sonographer:          Alejandro Carroll RDCS                    years Gender:            M                   Additional Staff:     Kayden Matthews CRNA Height:            175.26 cm           Admit Date:           4/30/2024 Weight:            72.58 kg            Admission Status:     Inpatient -                                                              Routine BSA / BMI:         1.88 m2 / 23.63     Encounter#:           [id number]                    kg/m2                                        Department Location:  [facility]                                                              Invasive Blood Pressure: 116 /69 mmHg Study Type:    TRANSESOPHAGEAL ECHO (POLI) Diagnosis/ICD: Bacteremia-R78.81 Indication:    Staphyococcus aureus bacteremia, R/o endocarditis CPT Code:      POLI Complete-88364; Doppler Limited-16637; Color Doppler-85219 Patient History: Allergies:         Cashew nuts. Smoker:            Unknown. Diabetes:          Yes Insulin Pertinent History: HTN, Hyperlipidemia and PVD. 39 y.o. male presents for POLI                    for endocarditis rule out.                     PMH of ESRD and SIRS. Study Detail: The following Echo studies were performed: 2D. Agitated saline               used as a contrast agent for intraseptal flow evaluation. Total               contrast used for this procedure  was 10 mL via IV push. Patient's               heart rhythm is sinus tachycardia. The patient was sedated.  PHYSICIAN INTERPRETATION: POLI Details: The POLI probe used was 5177. Technically adequate omniplane transesophageal echocardiogram performed. POLI Medication: The pharynx was anesthetized with Cetacaine spray and viscous lidocaine. The patient was sedated by Anesthesia; please refer to anesthesia flow sheet for medications used. POLI Procedure: The probe was passed without difficulty. Left Ventricle: The left ventricular systolic function is mildly decreased, with an estimated ejection fraction of 35-40%. Wall motion is abnormal. The left ventricular cavity size was not assessed. Left ventricular diastolic filling was not assessed. Left Atrium: The left atrium is normal in size. There is no definite left atrial thrombus present. A bubble study using agitated saline was performed. Bubble study is negative. There is a normal sized left atrial appendage. Right Ventricle: The right ventricle is normal in size. There is normal right ventricular global systolic function. Right Atrium: The right atrium is normal in size. Aortic Valve: There is no visualized aortic valve vegetation. The aortic valve appears structurally normal. There is no evidence of aortic valve regurgitation. Mitral Valve: The mitral valve is normal in structure. There was no mitral valve vegetation visualized. There is trace mitral valve regurgitation. Tricuspid Valve: No vegetation is seen on the tricuspid valve. The tricuspid valve is structurally normal. There is trace tricuspid regurgitation. Pulmonic Valve: No pulmonic valve vegetation visualized. The pulmonic valve is structurally normal. There is no indication of pulmonic valve regurgitation. Pericardium: There is no pericardial effusion noted. Aorta: The aortic root is normal.  CONCLUSIONS:  1. Left ventricular systolic function is mildly decreased with a 35-40% estimated ejection fraction.   2. No mitral valve vegetation visualized.  3. No tricuspid valve vegetation.  4. No aortic valve vegetation visualized.  5. No pulmonic valve vegetation visualized.  6. No left atrial thrombus. QUANTITATIVE DATA SUMMARY:  [id number] Edward Hernandez DO Electronically signed on 5/14/2024 at 7:06:59 PM  ** Final **     IR CVC tunneled    Result Date: 5/13/2024  Interpreted By:  Ronaldo Dempsey, STUDY: IR CVC TUNNELED; ;  5/13/2024 3:26 pm   ULTRASOUND FLUOROSCOPICALLY GUIDED RIGHT GROIN TEMPORARY HEMODIALYSIS CATHETER PLACEMENT   INDICATION: Signs/Symptoms:Temporary dialysis line today. 39-year-old man with end-stage renal disease, persistent bacteremia despite removal of groin tunneled hemodialysis catheter. Requires replacement of hemodialysis access.   COMPARISON: Fluoroscopic images from prior tunneled hemodialysis catheter exchange 05/13/2024   ACCESSION NUMBER(S): [id number]   ORDERING CLINICIAN: BRADEN QUIROGA   TECHNIQUE:   ATTENDING : Ronaldo Dempsey M.D.   TECHNICAL DESCRIPTION/FINDINGS: The procedure, including all risks, benefits and alternatives were explained to the patient in detail. All questions were answered and written informed consent was obtained.   Patient was positioned supine on the fluoroscopy table. A time-out was performed.   The bilateral groins were prepped and draped in usual sterile fashion. Focused ultrasound was performed over the superior aspect of the right common femoral vein demonstrating superior patency with partial compressibility. Ultrasound images were saved. 1% lidocaine was administered subcutaneously for local anesthesia. Under real-time ultrasound guidance, the right common femoral vein was accessed in antegrade direction using micropuncture technique. Ultrasound images were saved. Under fluoroscopic visualization, an 018 guidewire was advanced into the right common iliac vein and micropuncture transitional introducer was utilized for placement of an 035 guidewire  into the IVC. After serial dilation, a 13 Vietnamese by 20 cm Trialysis catheter was then placed. A completion fluoroscopic image was obtained demonstrating the tip of the temporary hemodialysis catheter projecting over the IV C.   Catheter lumens easily aspirated and flushed. Large-bore dialysis lumens were locked with a concentrated heparinized solution (1000 units/cc). A small bore 3rd lumen was locked with a dilute heparinized solution. The catheter hub was sutured to the skin with 2 0 Prolene stay suture. Sterile dressing was applied.   SEDATION/MEDICATIONS: Continuous cardiopulmonary monitoring was performed by a radiology nurse for the duration of the procedure. 0.5 mg Versed and   25 mcg Fentanyl were administered for moderate conscious sedation time of 20 minutes.      10 cc 1% Lidocaine was administered subcutaneously for local anesthesia. SPECIMENS: None. ESTIMATED BLOOD LOSS:  5 cc FLOUROSCOPY:  0.1 minutes; DAP  2405 mGy-cm*2; Air Kerma  6.58 mGy CONTRAST: None.   FINDINGS: Test       Ultrasound fluoroscopically guided right groin temporary hemodialysis catheter. The catheter is ready for immediate use.     MACRO: None   Signed by: Ronaldo Dempsey 5/13/2024 4:59 PM Dictation workstation:   [id number]    MR lumbar spine wo IV contrast    Result Date: 5/4/2024  Interpreted By:  Eva Og and Muddasani Dheeraj STUDY: MR LUMBAR SPINE WO IV CONTRAST   INDICATION: Signs/Symptoms:back pain new with bacteremia, possible infection   COMPARISON: CT of the lumbar spine 04/30/2024. Body CT 01/14/2024.   ACCESSION NUMBER(S): [id number]   ORDERING CLINICIAN: ALKA VIEYRA   TECHNIQUE: Sagittal T1, sagittal T2, sagittal STIR, axial T1 and axial T2 weighted MRI images of the lumbar spine were obtained without intravenous contrast administration.   FINDINGS: Image quality is somewhat degraded due to motion and technique.   There are 5 non-rib-bearing lumbar-type vertebral bodies. The last well-formed  intervertebral disc is labeled L5-S1.   Minimal retrolisthesis at L5-S1. Alignment is otherwise normal.   Vertebral body heights are maintained. Disc spaces are relatively maintained.   There is diffusely hypointense bone marrow signal on the T1 weighted images which may be related to renal osteodystrophy. Differential considerations include anemia, reactive marrow or bone marrow infiltrating process.   There is no increased STIR signal to suggest marrow edema. Multilevel degenerative endplate changes include prominent Schmorl's nodes most pronounced at L3-4 where there are Modic type 2 changes and central intradiscal T1 and T2 hypointense signal likely degenerative.   The conus medullaris terminates at L1-2. The visualized spinal cord, conus medullaris and cauda equina demonstrate normal signal and morphology.   Fatty atrophic changes involve the paravertebral musculature. Partially visualized kidneys demonstrate atrophic changes with multiple bilateral cysts, better characterized on previous body CT dated 01/14/2024. Partially visualized catheter within the inferior vena cava and right common iliac vein.   T10-11 on sagittal images only: No spinal canal or neural foraminal stenosis.   T11-12 on sagittal images only: No spinal canal or neural foraminal stenosis.   T12-L1: Facet arthropathy and ligamentum flavum thickening. No spinal canal or neural foraminal stenosis.   L1-2: Facet arthropathy and ligamentum flavum thickening. No spinal canal or neural foraminal stenosis.   L2-3: Facet arthropathy and ligamentum flavum thickening. No spinal canal or neural foraminal stenosis.   L3-4: Disc bulge, facet arthropathy and ligamentum flavum thickening. No spinal canal or neural foraminal stenosis.   L4-5: Disc bulge with small central protrusion, facet arthropathy and ligamentum flavum thickening. No spinal canal stenosis. Mild bilateral neural foraminal stenosis.   L5-S1: Disc bulge, facet arthropathy and ligamentum  flavum thickening. No spinal canal stenosis. Mild right and no left neural foraminal stenosis.   Degenerative changes are noted at the sacroiliac joints.       Diffusely hypointense bone marrow signal on the T1 weighted images may be related to renal osteodystrophy. Differential considerations include anemia, reactive bone marrow or bone marrow infiltrating process.   No definite imaging findings to suggest discitis osteomyelitis on the current exam.   Degenerative changes without significant spinal canal stenosis. Varying degrees of mild neural foraminal narrowing as detailed above.   I personally reviewed the images/study and I agree with the findings as stated by Dr. Lowe.   MACRO: None   Signed by: Eva Og 5/4/2024 11:11 AM Dictation workstation:   [id number]    IR CVC exchange    Result Date: 5/3/2024  Interpreted By:  Ronaldo Dempsey, STUDY: IR CVC EXCHANGE; ;  5/3/2024 6:28 pm   FLUOROSCOPICALLY GUIDED EXCHANGE OF RIGHT GROIN TUNNELED HEMODIALYSIS CATHETER   INDICATION: Signs/Symptoms:Bacteremia- Right fem TDC exchange. 39-year-old man with end-stage renal disease, central thoracic venous occlusion, end-stage vascular access with bacteremia. Guidewire exchange of right groin tunneled hemodialysis catheter in an attempt to salvage the access.   COMPARISON: Chest radiograph 04/30/2024, fluoroscopic images from prior tunneled hemodialysis catheter placement 01/22/2024   ACCESSION NUMBER(S): [id number]   ORDERING CLINICIAN: SACHI MORTENSEN   TECHNIQUE:   ATTENDING : Ronaldo Dempsey M.D.   TECHNICAL DESCRIPTION/FINDINGS: The procedure, including all risks, benefits and alternatives were explained to the patient in detail. All questions were answered and written informed consent was obtained.   The patient was positioned supine on the angiography table. A time-out was performed.   The right groin was prepped and draped in usual sterile fashion. A  fluoroscopic image was obtained  demonstrating the tunneled hemodialysis catheter terminating at the inferior cavoatrial junction.   1% lidocaine was administered via the subcutaneous tunnel tract for local anesthesia. Blunt dissection was performed to free the subcutaneous catheter cuff. An 035 stiff Glidewire was then advanced through the catheter into the right atrium. The old catheter was removed. Over the guidewire, a new 14.5 Iranian by 42 cm tip to cuff dual-lumen hemodialysis catheter was then placed. A completion fluoroscopic image demonstrates the catheter in the inferior right atrium.   Catheter lumens easily aspirated and flushed and were locked with a concentrated heparinized solution (1000 units/cc). The catheter hub was then sutured to the skin with 2 0 Prolene stay suture. Because of losing along the tract, Gel-Foam pledgets were then administered subcutaneously near where the catheter enters the skin for additional hemostasis, followed by a pursestring suture. A sterile dressing was applied.   SEDATION/MEDICATIONS: Continuous cardiopulmonary monitoring was performed by a radiology nurse for the duration of the procedure.  1 mg Versed and   50 mcg Fentanyl were administered for moderate conscious sedation time of 20 minutes.      10 cc 1% Lidocaine was administered subcutaneously for local anesthesia. SPECIMENS: None. ESTIMATED BLOOD LOSS:  5 cc FLOUROSCOPY:  1.2 minutes; DAP  53151 mGy-cm*2; Air Kerma  59.07 mGy CONTRAST: None.   FINDINGS: Test       Fluoroscopically guidewire exchange of right groin tunneled hemodialysis catheter. The newly exchanged catheter is ready for immediate use.     MACRO: None   Signed by: Ronaldo Dempsey 5/3/2024 8:09 PM Dictation workstation:   [id number]    Transthoracic Echo Complete    Result Date: 5/3/2024   [facility], [address] [place] [address]              Tel [phone] and Fax [phone] TRANSTHORACIC ECHOCARDIOGRAM REPORT  Patient Name:      XIOMARA TRUJILLO PHYLLIS          Reading Physician:    [id number] Brandon Nur DO Study Date:        5/3/2024             Ordering Provider:    [id number] ALKA VIEYRA MRN/PID:           [id number]             Fellow: Accession#:        [id number]         Nurse: Date of Birth/Age: 1985 / 39 years Sonographer:          Sy Lyons RDCS Gender:            M                    Additional Staff: Height:            175.00 cm            Admit Date: Weight:            65.80 kg             Admission Status:     Inpatient -                                                               Routine BSA / BMI:         1.80 m2 / 21.49      Encounter#:           [id number]                    kg/m2                                         Department Location[facility]  [facility]                                                               Invasive Blood Pressure: 112 /74 mmHg Study Type:    TRANSTHORACIC ECHO (TTE) COMPLETE Diagnosis/ICD: Endocarditis, valve unspecified-I38 Indication:    endocarditis CPT Code:      Echo Complete w Full Doppler-34284 Patient History: Pertinent History: HTN and Hyperlipidemia. Study Detail: The following Echo studies were performed: M-Mode, Doppler, color               flow and 2D.  PHYSICIAN INTERPRETATION: Left Ventricle: The left ventricular systolic function is moderately decreased, with an estimated ejection fraction of 35-40%. There are no regional wall motion abnormalities. The left ventricular cavity size is normal. Abnormal (paradoxical) septal motion, consistent with left bundle branch block. Spectral Doppler shows an impaired relaxation pattern of left ventricular diastolic filling. Left Atrium: The left atrium is normal in size. Right Ventricle: The right ventricle is normal in size. There is normal right ventricular global systolic function. Right Atrium: The right atrium is normal in size. Aortic Valve: The aortic valve is probably trileaflet. There is no evidence  of aortic valve regurgitation. Mitral Valve: The mitral valve is mildly thickened. There is trace mitral valve regurgitation. Tricuspid Valve: The tricuspid valve is structurally normal. No evidence of tricuspid regurgitation. Pulmonic Valve: The pulmonic valve is structurally normal. There is no indication of pulmonic valve regurgitation. Pericardium: There is no pericardial effusion noted. Aorta: The aortic root is normal. In comparison to the previous echocardiogram(s): Compared with study from 1/16/2024, LV function has declined. Was previously normal.  CONCLUSIONS:  1. Left ventricular systolic function is moderately decreased with a 35-40% estimated ejection fraction.  2. Abnormal septal motion consistent with left bundle branch block.  3. Spectral Doppler shows an impaired relaxation pattern of left ventricular diastolic filling.  4. No vegetations visualized within the limitations of this study.  5. Compared with study from 1/16/2024, LV function has declined. Was previously normal. QUANTITATIVE DATA SUMMARY: LA VOLUME:                               Normal Ranges: LA Vol A4C:        40.1 ml    (22+/-6mL/m2) LA Vol A2C:        35.3 ml LA Vol BP:         40.9 ml LA Vol Index A4C:  22.3ml/m2 LA Vol Index A2C:  19.6 ml/m2 LA Vol Index BP:   22.7 ml/m2 LA Area A4C:       15.3 cm2 LA Area A2C:       13.2 cm2 LA Major Axis A4C: 5.0 cm LA Major Axis A2C: 4.2 cm LA Volume Index:   22.7 ml/m2  [id number] Brandon Nur DO Electronically signed on 5/3/2024 at 4:24:04 PM  ** Final **     Lower extremity venous duplex right    Result Date: 5/1/2024             [place] [address]   Tel [phone] and Fax [phone]  Vascular Lab Report [facility] LOWER EXTREMITY VENOUS DUPLEX RIGHT  Patient Name:      XIOMARA Maya Physician:  [id number] Rayo Jauregui MD, RPVI Study Date:        4/30/2024            Ordering  Physician: [id number] DEEP PANIAGUA MRN/PID:           [id number]             Technologist:       Megan COLLAZOT Accession#:        [id number]         Technologist 2: Date of Birth/Age: 1985 / 39 years Encounter#:         [id number] Gender:            M Admission Status:  Emergency            Location Performed[facility]  Diagnosis/ICD: Pain in right leg-M79.604 CPT Codes:     11345 Peripheral venous duplex scan for DVT Limited  **CRITICAL RESULT** Critical Result: Acute DVT in the right leg. Notification called to Deep Paniagua PA-C on 4/30/2024 at 10:52:21 AM by Megan Shay RDMS, RVT.  CONCLUSIONS: Right Lower Venous: There is acute non-occlusive deep vein thrombosis visualized in the common femoral vein. There are chronic changes visualized in the popliteal vein. Enlarged lymph node noted in the right groin. Waveforms suggestive of more distal obstruction/thrombus. May wish further means of imaging evaluation. Left Lower Venous: There are chronic changes visualized in the common femoral vein.  Comparison: Compared with study from 7/27/2023, New finding of acute non-occlusive thrombus in the right CFV.  Imaging & Doppler Findings:  Right                 Compressible      Thrombus          Flow Distal External Iliac                     None CFV                     Partial    Acute non-occlusive Continuous PFV                       Yes             None FV Proximal               Yes             None         Continuous FV Mid                    Yes             None FV Distal                 Yes             None Popliteal                 Yes            Chronic       Continuous Peroneal                  Yes             None PTV                       Yes             None  Left Compress Thrombus        Flow CFV    Yes    Chronic  Spontaneous/Phasic  [id number] Rayo Jauregui MD, MARIANO Electronically signed by [id number] MARIANO Call MD on 5/1/2024 at 9:13:07 AM  ** Final **     XR chest 1 view    Result Date:  4/30/2024  STUDY: Chest Radiograph;  4/30/2024 at 2:42 PM. INDICATION: Fever. COMPARISON: XR chest 1/14/2024, 11/10/2023; CTA chest 2/15/2023. ACCESSION NUMBER(S): [id number] ORDERING CLINICIAN: WENDY INFANTE TECHNIQUE:  Frontal chest was obtained at 14:42 hours. FINDINGS: CARDIOMEDIASTINAL SILHOUETTE: Cardiomediastinal silhouette is normal in size and configuration.  LUNGS: Patchy right basilar infiltrate or atelectasis with small right pleural effusion. Left basilar scar versus atelectasis. ABDOMEN: No remarkable upper abdominal findings.  BONES: No acute osseous changes.    Patchy right basilar infiltrate or atelectasis with small right pleural effusion. Signed by Joseph Overton MD    CT thoracic spine wo IV contrast    Result Date: 4/30/2024  STUDY: CT Thoracic Spine and Lumbar Spine without IV Contrast; 4/30/2024 11:11 AM INDICATION: Midline back pain. COMPARISON: None Available. ACCESSION NUMBER(S): [id number], [id number] ORDERING CLINICIAN: RAJNI FARMER TECHNIQUE:  CT of the thoracic spine and lumbar spine was performed without intravenous or intrathecal contrast.  Sagittal and coronal reconstructions were generated.  Automated mA/kV exposure control was utilized and patient examination was performed in strict accordance with principles of ALARA. FINDINGS: Portions of the T1 vertebral body, as well as a small portion of the anterior superior aspect of T2 is cut off on this study.  No compression deformities are visualized within the thoracic vertebral bodies from T2 through T12.  No spondylolisthesis is noted.  No pedicular defects are noted.  No prominent edema is appreciated within the adjacent posterior paravertebral musculature.  Evaluation of the thecal sac is limited due to lack of myelographic contrast.  There are enlarged lymph nodes seen within the visualized portions of the mediastinum.  There are bilateral lower lobe infiltrates with tiny bilateral effusions.  Coronary artery calcifications  are present. No compression deformities are visualized within the lumbar spine.  No pars defects are noted.  No significant spondylolisthesis is seen.  No prominent edema is appreciated within the visualized portions of the psoas musculature.  A long dialysis catheter is visualized within the inferior vena cava.  Evaluation of the thecal sac is limited due to lack of myelographic contrast.  There is disc bulging seen from L3 through S1.  No significant impingement is appreciated upon the neural foramina on the sagittal reconstructions.    Thoracic spine: 1.  Portions of T1 and T2 cough on this study. 2.  No acute osseous findings seen from T3 through T12. 3.  Tiny bilateral effusions with adjacent lower lobe infiltrates. 4.  The distal adenopathy. 5.  Coronary artery calcifications. Lumbar spine: 1.  No compression deformities or spinal listhesis is noted. 2.  Disc bulging from L3 to S1.  Evaluation for spinal stenosis is limited due to lack mammographic contrast. 3.  Incidental note of a long dialysis catheter within the inferior vena cava. Signed by Deejay Hoffman MD    CT lumbar spine wo IV contrast    Result Date: 4/30/2024  STUDY: CT Thoracic Spine and Lumbar Spine without IV Contrast; 4/30/2024 11:11 AM INDICATION: Midline back pain. COMPARISON: None Available. ACCESSION NUMBER(S): [id number], [id number] ORDERING CLINICIAN: RAJNI FARMER TECHNIQUE:  CT of the thoracic spine and lumbar spine was performed without intravenous or intrathecal contrast.  Sagittal and coronal reconstructions were generated.  Automated mA/kV exposure control was utilized and patient examination was performed in strict accordance with principles of ALARA. FINDINGS: Portions of the T1 vertebral body, as well as a small portion of the anterior superior aspect of T2 is cut off on this study.  No compression deformities are visualized within the thoracic vertebral bodies from T2 through T12.  No spondylolisthesis is noted.  No pedicular  defects are noted.  No prominent edema is appreciated within the adjacent posterior paravertebral musculature.  Evaluation of the thecal sac is limited due to lack of myelographic contrast.  There are enlarged lymph nodes seen within the visualized portions of the mediastinum.  There are bilateral lower lobe infiltrates with tiny bilateral effusions.  Coronary artery calcifications are present. No compression deformities are visualized within the lumbar spine.  No pars defects are noted.  No significant spondylolisthesis is seen.  No prominent edema is appreciated within the visualized portions of the psoas musculature.  A long dialysis catheter is visualized within the inferior vena cava.  Evaluation of the thecal sac is limited due to lack of myelographic contrast.  There is disc bulging seen from L3 through S1.  No significant impingement is appreciated upon the neural foramina on the sagittal reconstructions.    Thoracic spine: 1.  Portions of T1 and T2 cough on this study. 2.  No acute osseous findings seen from T3 through T12. 3.  Tiny bilateral effusions with adjacent lower lobe infiltrates. 4.  The distal adenopathy. 5.  Coronary artery calcifications. Lumbar spine: 1.  No compression deformities or spinal listhesis is noted. 2.  Disc bulging from L3 to S1.  Evaluation for spinal stenosis is limited due to lack mammographic contrast. 3.  Incidental note of a long dialysis catheter within the inferior vena cava. Signed by Deejay Hoffman MD    XR pelvis 1-2 views    Result Date: 4/30/2024  STUDY: Pelvis Radiographs; 4/30/2024 at 9:06 AM. INDICATION: Pain. COMPARISON: CT pelvis 7/17/2023. ACCESSION NUMBER(S): [id number] ORDERING CLINICIAN: RAJNI FARMER TECHNIQUE:  One view(s) of the pelvis. FINDINGS:  The pelvic ring is intact.  There is no acute fracture.  Severe underlying osteopenia.    Slightly limited secondary to underlying osteopenia.  No acute osseous abnormality. Signed by Roe Ruiz MD        Current Facility-Administered Medications:     [Held by provider] apixaban (Eliquis) tablet 5 mg, 5 mg, oral, BID, OSBALDO Wilson, DNP, 5 mg at 05/03/24 2156    B complex-vitamin C-folic acid (Nephrocaps) capsule 1 capsule, 1 capsule, oral, Daily, OSBALDO Wilson, DNP, 1 capsule at 05/16/24 0956    ceftaroline (Teflaro) 200 mg in dextrose 5 % in water (D5W) 50 mL IV, 200 mg, intravenous, q8h, Sheldon Saleh MD    cyclobenzaprine (Flexeril) tablet 5 mg, 5 mg, oral, TID PRN, El Estrada MD, 5 mg at 05/15/24 1945    DAPTOmycin (Cubicin) 700 mg/100 mL  mg, 700 mg, intravenous, q48h, Sheldon Saleh MD    dextrose 50 % injection 12.5 g, 12.5 g, intravenous, q15 min PRN, Veronica De La Paz MD    dextrose 50 % injection 25 g, 25 g, intravenous, q15 min PRN, Mike Santana MD    epoetin tyson-epbx (Retacrit) injection 8,000 Units, 100 Units/kg, subcutaneous, Every Mon/Wed/Fri, Eber Bruce DO, 8,000 Units at 05/15/24 1820    fentaNYL PF (Sublimaze) injection, , , PRN, Ronaldo Dempsey MD, 25 mcg at 05/13/24 1513    glucagon (Glucagen) injection 1 mg, 1 mg, intramuscular, q15 min PRN, Veronica De La Paz MD    heparin 1,000 unit/mL injection 3,100 Units, 3,100 Units, intra-catheter, After Dialysis, OSBALDO Wilson DNP, 3,100 Units at 05/15/24 1205    heparin 1,000 unit/mL injection 3,100 Units, 3,100 Units, intra-catheter, After Dialysis, OSBALDO Wilson DNP, 3,100 Units at 05/15/24 1205    heparin 1,000 unit/mL injection, , , PRN, Ronaldo Dempsey MD, 1,800 Units at 05/13/24 2329    insulin glargine (Lantus) injection 2 Units, 2 Units, subcutaneous, Nightly, Mike Santana MD, 2 Units at 05/15/24 2210    insulin lispro (HumaLOG) injection 0-5 Units, 0-5 Units, subcutaneous, TID, Veronica De La Paz MD, 1 Units at 05/16/24 0956    levothyroxine (Synthroid, Levoxyl) tablet 125 mcg, 125 mcg, oral, Daily before breakfast, Andi Bazan, APRN-CNP, DNP,  125 mcg at 05/16/24 0734    lidocaine (Xylocaine) 20 mg/mL (2 %) injection, , , PRN, Ronaldo Dempsey MD, 5 mL at 05/13/24 1513    lidocaine PF (Xylocaine) 10 mg/mL (1 %) injection, , , PRN, Ronaldo Dempsey MD, 5 mL at 05/03/24 1816    loperamide (Imodium) capsule 2 mg, 2 mg, oral, q4h PRN, OSBALDO Wilson, DNP    midazolam (Versed) injection, , , PRN, Ronaldo Dempsey MD, 0.5 mg at 05/13/24 1513    nitroglycerin (Nitrostat) SL tablet 0.4 mg, 0.4 mg, sublingual, q5 min PRN, OSBALDO Wilson, LUCILA    ondansetron (Zofran) injection 4 mg, 4 mg, intravenous, q6h PRN, OSBALDO Wilson, LUCILA    oxyCODONE (Roxicodone) immediate release tablet 5 mg, 5 mg, oral, q4h PRN, OSBALDO Wilson, DNP, 5 mg at 05/16/24 0424    oxygen (O2) therapy, , , Continuous PRN, Ronaldo Dempsey MD, Last Rate: 120,000 mL/hr at 05/03/24 1758, 2 L/min at 05/03/24 1758    oxygen (O2) therapy, , inhalation, Continuous PRN - O2/gases, OSBALDO Rousseau    oxygen (O2) therapy, , inhalation, Continuous PRN - O2/gases, OSBALDO Rousseau    pantoprazole (ProtoNix) injection 40 mg, 40 mg, intravenous, BID, OSBALDO Marin, 40 mg at 05/16/24 0956    polyethylene glycol (Glycolax, Miralax) packet 17 g, 17 g, oral, Daily, OSBALDO Wilson, DNP, 17 g at 05/04/24 0624    sennosides (Senokot) tablet 8.6 mg, 8.6 mg, oral, BID, Andi Bazan APRN-CNP, DNP, 8.6 mg at 05/12/24 2239    sevelamer carbonate (Renvela) tablet 2,400 mg, 2,400 mg, oral, TID, AMARILYS Wilson-CNP, DNP, 2,400 mg at 05/15/24 1756    simethicone (Mylicon) chewable tablet 80 mg, 80 mg, oral, q8h PRN, ROSS WilsonCNP, DNP    sodium chloride 0.9% infusion, 100 mL/hr, intravenous, Continuous, OSBALDO Rousseau, Last Rate: 100 mL/hr at 05/16/24 0424, 100 mL/hr at 05/16/24 0424    sodium zirconium cyclosilicate (Lokelma) packet 10 g, 10 g, oral,  Daily, Eber Bruce, DO           Assessment/Plan      1.  SIRS, continue current management antibiotics  2.  ESKD on hemodialysis[facility]  We will schedule patient for dialysis tomorrow.  3.  Bacteremia with MRSA, status post multiple dialysis catheter placement and replacements, following with ID  4.  Diabetes mellitus continue current management  5.  Hypertension, continue current management  6.  Anemia of CKD, will continue admission SINA and iron supplementation as needed  7.  Metabolic bone disease due to CKD we will continue with phosphate binders and vitamin D products.  8.  Severe deconditioning, will continue with physical therapy.        Will round patient on daily basis reviewing labs and other consultants input              I spent 35 minutes in the professional and overall care of this patient.      Gracie Hollins MD

## 2024-05-17 ENCOUNTER — APPOINTMENT (OUTPATIENT)
Dept: DIALYSIS | Facility: HOSPITAL | Age: 39
End: 2024-05-17
Payer: COMMERCIAL

## 2024-05-17 LAB
ALBUMIN SERPL BCP-MCNC: 2.1 G/DL (ref 3.4–5)
ANION GAP SERPL CALC-SCNC: 13 MMOL/L (ref 10–20)
BUN SERPL-MCNC: 15 MG/DL (ref 6–23)
CALCIUM SERPL-MCNC: 8.1 MG/DL (ref 8.6–10.3)
CHLORIDE SERPL-SCNC: 99 MMOL/L (ref 98–107)
CK SERPL-CCNC: 27 U/L (ref 0–325)
CO2 SERPL-SCNC: 28 MMOL/L (ref 21–32)
CREAT SERPL-MCNC: 3.83 MG/DL (ref 0.5–1.3)
EGFRCR SERPLBLD CKD-EPI 2021: 19 ML/MIN/1.73M*2
GLUCOSE BLD MANUAL STRIP-MCNC: 128 MG/DL (ref 74–99)
GLUCOSE BLD MANUAL STRIP-MCNC: 142 MG/DL (ref 74–99)
GLUCOSE BLD MANUAL STRIP-MCNC: 171 MG/DL (ref 74–99)
GLUCOSE SERPL-MCNC: 108 MG/DL (ref 74–99)
HOLD SPECIMEN: NORMAL
PHOSPHATE SERPL-MCNC: 3.2 MG/DL (ref 2.5–4.9)
POTASSIUM SERPL-SCNC: 3.6 MMOL/L (ref 3.5–5.3)
SODIUM SERPL-SCNC: 136 MMOL/L (ref 136–145)

## 2024-05-17 PROCEDURE — 82550 ASSAY OF CK (CPK): CPT | Performed by: PHARMACIST

## 2024-05-17 PROCEDURE — 2500000004 HC RX 250 GENERAL PHARMACY W/ HCPCS (ALT 636 FOR OP/ED)

## 2024-05-17 PROCEDURE — 80069 RENAL FUNCTION PANEL: CPT | Performed by: SPECIALIST

## 2024-05-17 PROCEDURE — 2500000004 HC RX 250 GENERAL PHARMACY W/ HCPCS (ALT 636 FOR OP/ED): Performed by: INTERNAL MEDICINE

## 2024-05-17 PROCEDURE — 2500000002 HC RX 250 W HCPCS SELF ADMINISTERED DRUGS (ALT 637 FOR MEDICARE OP, ALT 636 FOR OP/ED): Performed by: INTERNAL MEDICINE

## 2024-05-17 PROCEDURE — 1200000002 HC GENERAL ROOM WITH TELEMETRY DAILY

## 2024-05-17 PROCEDURE — 8010000001 HC DIALYSIS - HEMODIALYSIS PER DAY

## 2024-05-17 PROCEDURE — C9113 INJ PANTOPRAZOLE SODIUM, VIA: HCPCS

## 2024-05-17 PROCEDURE — 2500000004 HC RX 250 GENERAL PHARMACY W/ HCPCS (ALT 636 FOR OP/ED): Performed by: NURSE PRACTITIONER

## 2024-05-17 PROCEDURE — 36415 COLL VENOUS BLD VENIPUNCTURE: CPT | Performed by: INTERNAL MEDICINE

## 2024-05-17 PROCEDURE — 82947 ASSAY GLUCOSE BLOOD QUANT: CPT

## 2024-05-17 PROCEDURE — 87040 BLOOD CULTURE FOR BACTERIA: CPT | Mod: AHULAB | Performed by: INTERNAL MEDICINE

## 2024-05-17 PROCEDURE — 2500000001 HC RX 250 WO HCPCS SELF ADMINISTERED DRUGS (ALT 637 FOR MEDICARE OP): Performed by: NURSE PRACTITIONER

## 2024-05-17 RX ADMIN — CEFTAROLINE FOSAMIL 200 MG: 600 POWDER, FOR SOLUTION INTRAVENOUS at 22:24

## 2024-05-17 RX ADMIN — CEFTAROLINE FOSAMIL 200 MG: 600 POWDER, FOR SOLUTION INTRAVENOUS at 11:49

## 2024-05-17 RX ADMIN — HEPARIN SODIUM 3100 UNITS: 1000 INJECTION INTRAVENOUS; SUBCUTANEOUS at 15:00

## 2024-05-17 RX ADMIN — CEFTAROLINE FOSAMIL 200 MG: 600 POWDER, FOR SOLUTION INTRAVENOUS at 03:52

## 2024-05-17 RX ADMIN — OXYCODONE HYDROCHLORIDE 5 MG: 5 TABLET ORAL at 22:36

## 2024-05-17 RX ADMIN — PANTOPRAZOLE SODIUM 40 MG: 40 INJECTION, POWDER, FOR SOLUTION INTRAVENOUS at 09:52

## 2024-05-17 RX ADMIN — LEVOTHYROXINE SODIUM 125 MCG: 125 TABLET ORAL at 06:40

## 2024-05-17 RX ADMIN — INSULIN GLARGINE 2 UNITS: 100 INJECTION, SOLUTION SUBCUTANEOUS at 22:28

## 2024-05-17 RX ADMIN — OXYCODONE HYDROCHLORIDE 5 MG: 5 TABLET ORAL at 03:52

## 2024-05-17 ASSESSMENT — PAIN SCALES - GENERAL
PAINLEVEL_OUTOF10: 0 - NO PAIN
PAINLEVEL_OUTOF10: 5 - MODERATE PAIN
PAINLEVEL_OUTOF10: 8
PAINLEVEL_OUTOF10: 8

## 2024-05-17 ASSESSMENT — PAIN - FUNCTIONAL ASSESSMENT
PAIN_FUNCTIONAL_ASSESSMENT: 0-10
PAIN_FUNCTIONAL_ASSESSMENT: 0-10

## 2024-05-17 ASSESSMENT — PAIN SCALES - PAIN ASSESSMENT IN ADVANCED DEMENTIA (PAINAD): TOTALSCORE: MEDICATION (SEE MAR)

## 2024-05-17 NOTE — NURSING NOTE
Report to Receiving RN:    Report To: Alejandra  Time Report Called: face to face  1800  Hand-Off Communication: 3.5 hrs tx completed via right femoral cvc. CVC heparin  locked per lumen volume.  Complications During Treatment: Yes, lines reversed; prescribed BFR achieved. Dr. Myers notified  Ultrafiltration : Yes, 1.5 liters of fluid removed  Medications Administered During Dialysis: No  Blood Products Administered During Dialysis: No  Labs Sent During Dialysis: Yes  Heparin Drip Rate Changes: No  Dialysis Catheter Dressing: dressing changed; clean, dry and intact  Last Dressing Change: 5/17/24    Electronic Signatures:   (Signed Jennifer Goode RN)   Authored:    (Signed )   Authored:     Last Updated: 5:05 PM by JENNIFER GOODE

## 2024-05-17 NOTE — PROGRESS NOTES
Edwar Hemphill is a 39 y.o. male on day 17 of admission presenting with SIRS (systemic inflammatory response syndrome) (Multi).      Subjective   Patient was seen, examined and evaluated.  No new complaints today scheduled for hemodialysis later this afternoon.       Objective        Vitals 24HR  Heart Rate:  []   Temp:  [36.7 °C (98.1 °F)-37.4 °C (99.3 °F)]   Resp:  [15-18]   BP: (103-155)/(66-93)   SpO2:  [96 %-99 %]     Intake/Output last 3 Shifts:    Intake/Output Summary (Last 24 hours) at 5/17/2024 1233  Last data filed at 5/17/2024 0424  Gross per 24 hour   Intake 2741.67 ml   Output --   Net 2741.67 ml       Physical Exam          GEN appearance; awake and alert no acute distress  Head and ENT; normocephalic/atraumatic/PERRL neck/JVD  Lungs: CTA  Heart; RRR  Abdomen; soft no tenderness  Extremities; right upper extremity amputation, left AKA  Neurologic; physiologic        Dialysis access= right groin temporary hemodialysis catheter.          Relevant Results     Results from last 7 days   Lab Units 05/16/24  1115 05/14/24  0625 05/11/24  0631   WBC AUTO x10*3/uL 6.6 7.7 7.0   HEMOGLOBIN g/dL 7.5* 7.8* 8.2*   HEMATOCRIT % 25.3* 25.4* 27.1*   PLATELETS AUTO x10*3/uL 251 338 304      Results from last 7 days   Lab Units 05/14/24  0625 05/12/24  0710 05/11/24  0631   SODIUM mmol/L 134* 130* 132*   POTASSIUM mmol/L 4.1 5.9* 5.0   CHLORIDE mmol/L 94* 94* 94*   CO2 mmol/L 21 26 26   BUN mg/dL 22 53* 51*   CREATININE mg/dL 4.47* 7.61* 6.99*   GLUCOSE mg/dL 176* 164* 122*   CALCIUM mg/dL 8.5* 9.2 9.6        Current Facility-Administered Medications:     [Held by provider] apixaban (Eliquis) tablet 5 mg, 5 mg, oral, BID, AMARILYS Wilson-CNP, DNP, 5 mg at 05/03/24 2156    B complex-vitamin C-folic acid (Nephrocaps) capsule 1 capsule, 1 capsule, oral, Daily, Andi Bazan APRN-CNP, DNP, 1 capsule at 05/16/24 0956    ceftaroline (Teflaro) 200 mg in dextrose 5 % in water (D5W) 50 mL IV, 200 mg,  intravenous, q8h, Sheldon Saleh MD, Last Rate: 0 mL/hr at 05/17/24 0452, 200 mg at 05/17/24 1149    cyclobenzaprine (Flexeril) tablet 5 mg, 5 mg, oral, TID PRN, El Estrada MD, 5 mg at 05/16/24 2035    DAPTOmycin (Cubicin) 700 mg/100 mL  mg, 700 mg, intravenous, q48h, Sheldon Saleh MD, Stopped at 05/16/24 1903    dextrose 50 % injection 12.5 g, 12.5 g, intravenous, q15 min PRN, Veronica De La Paz MD    dextrose 50 % injection 25 g, 25 g, intravenous, q15 min PRN, Mike Santana MD    epoetin tyson-epbx (Retacrit) injection 8,000 Units, 100 Units/kg, subcutaneous, Every Mon/Wed/Fri, Eber Bruce, , 8,000 Units at 05/15/24 1820    fentaNYL PF (Sublimaze) injection, , , PRN, Ronaldo Dempsey MD, 25 mcg at 05/13/24 1513    glucagon (Glucagen) injection 1 mg, 1 mg, intramuscular, q15 min PRN, Veronica De La Paz MD    heparin 1,000 unit/mL injection 3,100 Units, 3,100 Units, intra-catheter, After Dialysis, OSBALDO Wilson, DNP, 3,100 Units at 05/15/24 1205    heparin 1,000 unit/mL injection 3,100 Units, 3,100 Units, intra-catheter, After Dialysis, OSBALDO Wilson, DNP, 3,100 Units at 05/15/24 1205    heparin 1,000 unit/mL injection, , , PRN, Ronaldo Dempsey MD, 1,800 Units at 05/13/24 2329    insulin glargine (Lantus) injection 2 Units, 2 Units, subcutaneous, Nightly, Mike Santana MD, 2 Units at 05/16/24 2155    insulin lispro (HumaLOG) injection 0-5 Units, 0-5 Units, subcutaneous, TID, Veronica De La Paz MD, 1 Units at 05/16/24 0956    levothyroxine (Synthroid, Levoxyl) tablet 125 mcg, 125 mcg, oral, Daily before breakfast, AMARILYS Wilson-CNP, DNP, 125 mcg at 05/17/24 0640    lidocaine (Xylocaine) 20 mg/mL (2 %) injection, , , PRN, Ronaldo Dempsey MD, 5 mL at 05/13/24 1513    lidocaine PF (Xylocaine) 10 mg/mL (1 %) injection, , , PRN, Ronaldo Dempsey MD, 5 mL at 05/03/24 1816    loperamide (Imodium) capsule 2 mg, 2 mg, oral, q4h PRN, Andi Bazan,  APRN-CNP, DNP    midazolam (Versed) injection, , , PRN, Ronaldo Dempsey MD, 0.5 mg at 05/13/24 1513    nitroglycerin (Nitrostat) SL tablet 0.4 mg, 0.4 mg, sublingual, q5 min PRN, OSBALDO Wilson, DNP    ondansetron (Zofran) injection 4 mg, 4 mg, intravenous, q6h PRN, OSBALDO Wilson DNP    oxyCODONE (Roxicodone) immediate release tablet 5 mg, 5 mg, oral, q4h PRN, OSBALDO Wilson DNP, 5 mg at 05/17/24 0352    oxygen (O2) therapy, , , Continuous PRN, Ronaldo Dempsey MD, Last Rate: 120,000 mL/hr at 05/03/24 1758, 2 L/min at 05/03/24 1758    oxygen (O2) therapy, , inhalation, Continuous PRN - O2/gases, OSBALDO Rousseau    oxygen (O2) therapy, , inhalation, Continuous PRN - O2/gases, OSBALDO Rousseau    pantoprazole (ProtoNix) injection 40 mg, 40 mg, intravenous, BID, Shannan Schwartz, OSBALDO, 40 mg at 05/17/24 0952    polyethylene glycol (Glycolax, Miralax) packet 17 g, 17 g, oral, Daily, OSBALDO Wilson, DNP, 17 g at 05/04/24 0624    sennosides (Senokot) tablet 8.6 mg, 8.6 mg, oral, BID, OSBALDO Wilson, DNP, 8.6 mg at 05/12/24 2239    sevelamer carbonate (Renvela) tablet 2,400 mg, 2,400 mg, oral, TID, OSBALDO Wilson, DNP, 2,400 mg at 05/16/24 1412    simethicone (Mylicon) chewable tablet 80 mg, 80 mg, oral, q8h PRN, Andi Bazan AMARILYS-CNP, DNP    sodium chloride 0.9% infusion, 100 mL/hr, intravenous, Continuous, Cynthia Pate, AMARILYS-CNP, Last Rate: 100 mL/hr at 05/17/24 0424, 100 mL/hr at 05/17/24 0424    sodium zirconium cyclosilicate (Lokelma) packet 10 g, 10 g, oral, Daily, Eber Bruce, DO           Assessment/Plan          1.  SIRS, continue current management antibiotics  2.  ESKD on hemodialysis, goes to [facility] on Monday[facility]Friday   schedule patient for dialysis later this afternoon.  3.  Bacteremia with MRSA, status post multiple dialysis catheter placement and  replacements, following with ID  4.  Diabetes mellitus continue current management  5.  Hypertension, continue current management  6.  Anemia of CKD, will continue admission SINA and iron supplementation as needed  7.  Metabolic bone disease due to CKD we will continue with phosphate binders and vitamin D products.  8.  Severe deconditioning, will continue with physical therapy.        Will round patient on daily basis reviewing labs and other consultants input    Principal Problem:    SIRS (systemic inflammatory response syndrome) (Multi)       I spent 35 minutes in the professional and overall care of this patient.      Gracie Hollins MD

## 2024-05-17 NOTE — PROGRESS NOTES
"                                 '' Infectious Disease Progress Note''        Interval Events:  No new symptoms  Afebrile      Current antibiotic:  Ceftaroline  Daptomycin    Physical Exam:  BP (!) 141/93 (BP Location: Left arm, Patient Position: Lying)   Pulse 100   Temp 36.7 °C (98.1 °F) (Oral)   Resp 16   Ht 1.753 m (5' 9.02\")   Wt 74.9 kg (165 lb 2 oz)   SpO2 97%   BMI 24.37 kg/m²   General: NAD, nontoxic appearing  Skin: no new rash  Resp: lungs CTA b/l  CV:  normal S1/S2, no murmur   Abd: soft, non-tender  Ext: no edema,+ R groin TDC, s/p R AKA and RUE amputation      Lab Results:  Reviewed    Micro:  5/7 blood culture: MRSA  5/10 blood culture: MRSA  5/14 blood culture: MRSA      Imaging: reviewed         Assessment:  -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 Not having a full line holiday because of risk of losing HD access. TTE without endocarditis. Repeat blood cx 5/5 and 5/7 still positive. HD cath removed on 5/9. 5/10 blood cx still positive. New temp HD cath placed 5/13. POLI negative for endocarditis 5/14.      -Persistent MRSA bacteremia  -Lumbar spine pain   MRI without VO/discitis  -Diabetes with DKA  -ESRD on HD (MWF)      Plan/Recommendations:  -c/w Daptomycin and ceftaroline as salvage therapy for persistent MRSA bacteremia  -Repeat blood culture today (one set from peripheral iv and one set from his TDC)  -Monitoring for adverse reaction of antibiotics such as rash/itching/diarrhea.      Please call ID with any concerns or questions.  Discussed with patient and pharmacy.    Sheldon Saleh MD  [facility]  #[phone]      "

## 2024-05-17 NOTE — PROGRESS NOTES
05/17/24 1114   Discharge Planning   Patient expects to be discharged to: [facility]     Patient is not med ready for discharge, needs re-peat blood cx today, previous cultures + for MRSA, remains on multiple IV abx, ADOD 5/21, will continue to monitor for discharge planning.

## 2024-05-17 NOTE — NURSING NOTE
Report from Sending RN:    Report From: Kya Reaves  Recent Surgery of Procedure: No  Baseline Level of Consciousness (LOC): A&Ox3  Oxygen Use: No  Type: room air  Diabetic: Yes  Last BP Med Given Day of Dialysis: see MAR  Last Pain Med Given: see MAR  Lab Tests to be Obtained with Dialysis: No  Blood Transfusion to be Given During Dialysis: No  Available IV Access: Yes  Medications to be Administered During Dialysis: see MAR  Continuous IV Infusion Running: No  Restraints on Currently or in the Last 24 Hours: No  Hand-Off Communication: bedside tx/stable for hd/ mwf via right femoral cvc

## 2024-05-17 NOTE — PROGRESS NOTES
Edwar Hemphill is a 39 y.o. male on day 17 of admission presenting with SIRS (systemic inflammatory response syndrome) (Multi).      Subjective   No distress        Objective     Last Recorded Vitals  BP (!) 141/93 (BP Location: Left arm, Patient Position: Lying)   Pulse 100   Temp 36.7 °C (98.1 °F) (Oral)   Resp 16   Wt 74.9 kg (165 lb 2 oz)   SpO2 97%   Intake/Output last 3 Shifts:    Intake/Output Summary (Last 24 hours) at 5/17/2024 0643  Last data filed at 5/17/2024 0424  Gross per 24 hour   Intake 2741.67 ml   Output --   Net 2741.67 ml       Admission Weight  Weight: 65.8 kg (145 lb) (04/30/24 0825)    Daily Weight  05/15/24 : 74.9 kg (165 lb 2 oz)    Image Results  IR CVC removal  These images are not reportable by radiology and will not be interpreted   by  Radiologists.      PHYSICAL EXAM  NEUROLOGICAL: No abnormal movements, no changes from before  HEENT: Head atraumatic, perrl,eomi, no oral lesions, no ear rash   NECK: Supple, no ln, jvp flat, thyroid palp  HEART: S1, S2, no added sound  LUNGS: CTAB, no crackles, no rales, no wheezing, no dullness to percussion, sym exp  EXTREMITIES: no edema  ABDOMEN: Soft, nontender, bowel sound positive, no organomegaly  SKIN: No change  EYES: No changes, perrl, eomi,     Relevant Results               Assessment/Plan              Esrd  T2dm  Anemia of renal origin  Oa      Principal Problem:    SIRS (systemic inflammatory response syndrome) (Multi)      If bc neg on Sunday hopefully dc early next week       Malnutrition Diagnosis Status: New  Malnutrition Diagnosis: Severe malnutrition related to chronic disease or condition  As Evidenced by: 18% weight loss over 8 months, intake <75% meals for > 1 month.  I agree with the dietitian's malnutrition diagnosis.     t32    Mauricio Estrada MD

## 2024-05-18 LAB
BACTERIA BLD AEROBE CULT: ABNORMAL
BACTERIA BLD CULT: ABNORMAL
GLUCOSE BLD MANUAL STRIP-MCNC: 103 MG/DL (ref 74–99)
GLUCOSE BLD MANUAL STRIP-MCNC: 148 MG/DL (ref 74–99)
GLUCOSE BLD MANUAL STRIP-MCNC: 160 MG/DL (ref 74–99)
GRAM STN SPEC: ABNORMAL
GRAM STN SPEC: ABNORMAL

## 2024-05-18 PROCEDURE — 2500000004 HC RX 250 GENERAL PHARMACY W/ HCPCS (ALT 636 FOR OP/ED): Mod: JZ | Performed by: INTERNAL MEDICINE

## 2024-05-18 PROCEDURE — C9113 INJ PANTOPRAZOLE SODIUM, VIA: HCPCS

## 2024-05-18 PROCEDURE — 2500000001 HC RX 250 WO HCPCS SELF ADMINISTERED DRUGS (ALT 637 FOR MEDICARE OP): Performed by: NURSE PRACTITIONER

## 2024-05-18 PROCEDURE — 2500000002 HC RX 250 W HCPCS SELF ADMINISTERED DRUGS (ALT 637 FOR MEDICARE OP, ALT 636 FOR OP/ED): Performed by: INTERNAL MEDICINE

## 2024-05-18 PROCEDURE — 2500000004 HC RX 250 GENERAL PHARMACY W/ HCPCS (ALT 636 FOR OP/ED)

## 2024-05-18 PROCEDURE — 99231 SBSQ HOSP IP/OBS SF/LOW 25: CPT | Performed by: INTERNAL MEDICINE

## 2024-05-18 PROCEDURE — 1200000002 HC GENERAL ROOM WITH TELEMETRY DAILY

## 2024-05-18 PROCEDURE — 2500000006 HC RX 250 W HCPCS SELF ADMINISTERED DRUGS (ALT 637 FOR ALL PAYERS): Mod: MUE | Performed by: NURSE PRACTITIONER

## 2024-05-18 PROCEDURE — 82947 ASSAY GLUCOSE BLOOD QUANT: CPT

## 2024-05-18 RX ADMIN — LEVOTHYROXINE SODIUM 125 MCG: 125 TABLET ORAL at 06:00

## 2024-05-18 RX ADMIN — NEPHROCAP 1 CAPSULE: 1 CAP ORAL at 08:58

## 2024-05-18 RX ADMIN — INSULIN GLARGINE 2 UNITS: 100 INJECTION, SOLUTION SUBCUTANEOUS at 21:33

## 2024-05-18 RX ADMIN — SENNOSIDES 8.6 MG: 8.6 TABLET, FILM COATED ORAL at 21:33

## 2024-05-18 RX ADMIN — DAPTOMYCIN IN SODIUM CHLORIDE 700 MG: 700 INJECTION, SOLUTION INTRAVENOUS at 17:25

## 2024-05-18 RX ADMIN — PANTOPRAZOLE SODIUM 40 MG: 40 INJECTION, POWDER, FOR SOLUTION INTRAVENOUS at 08:57

## 2024-05-18 RX ADMIN — SEVELAMER CARBONATE 2400 MG: 800 TABLET, FILM COATED ORAL at 08:57

## 2024-05-18 RX ADMIN — SEVELAMER CARBONATE 2400 MG: 800 TABLET, FILM COATED ORAL at 12:07

## 2024-05-18 RX ADMIN — PANTOPRAZOLE SODIUM 40 MG: 40 INJECTION, POWDER, FOR SOLUTION INTRAVENOUS at 21:00

## 2024-05-18 RX ADMIN — CEFTAROLINE FOSAMIL 200 MG: 600 POWDER, FOR SOLUTION INTRAVENOUS at 03:17

## 2024-05-18 RX ADMIN — CEFTAROLINE FOSAMIL 200 MG: 600 POWDER, FOR SOLUTION INTRAVENOUS at 12:07

## 2024-05-18 RX ADMIN — OXYCODONE HYDROCHLORIDE 5 MG: 5 TABLET ORAL at 12:07

## 2024-05-18 RX ADMIN — CEFTAROLINE FOSAMIL 200 MG: 600 POWDER, FOR SOLUTION INTRAVENOUS at 18:14

## 2024-05-18 RX ADMIN — SEVELAMER CARBONATE 2400 MG: 800 TABLET, FILM COATED ORAL at 17:24

## 2024-05-18 ASSESSMENT — COGNITIVE AND FUNCTIONAL STATUS - GENERAL
MOVING TO AND FROM BED TO CHAIR: A LOT
DRESSING REGULAR LOWER BODY CLOTHING: A LOT
DAILY ACTIVITIY SCORE: 14
CLIMB 3 TO 5 STEPS WITH RAILING: TOTAL
MOVING TO AND FROM BED TO CHAIR: A LOT
TOILETING: A LOT
MOBILITY SCORE: 12
TURNING FROM BACK TO SIDE WHILE IN FLAT BAD: A LITTLE
TOILETING: A LOT
DAILY ACTIVITIY SCORE: 14
MOBILITY SCORE: 12
MOVING FROM LYING ON BACK TO SITTING ON SIDE OF FLAT BED WITH BEDRAILS: A LITTLE
STANDING UP FROM CHAIR USING ARMS: A LOT
STANDING UP FROM CHAIR USING ARMS: A LOT
MOBILITY SCORE: 12
MOVING TO AND FROM BED TO CHAIR: A LOT
DRESSING REGULAR UPPER BODY CLOTHING: A LOT
WALKING IN HOSPITAL ROOM: TOTAL
HELP NEEDED FOR BATHING: A LOT
TURNING FROM BACK TO SIDE WHILE IN FLAT BAD: A LITTLE
DRESSING REGULAR LOWER BODY CLOTHING: A LOT
PERSONAL GROOMING: A LOT
CLIMB 3 TO 5 STEPS WITH RAILING: TOTAL
TOILETING: A LOT
HELP NEEDED FOR BATHING: A LOT
MOVING FROM LYING ON BACK TO SITTING ON SIDE OF FLAT BED WITH BEDRAILS: A LITTLE
CLIMB 3 TO 5 STEPS WITH RAILING: TOTAL
STANDING UP FROM CHAIR USING ARMS: A LOT
DRESSING REGULAR UPPER BODY CLOTHING: A LOT
PERSONAL GROOMING: A LOT
PERSONAL GROOMING: A LOT
WALKING IN HOSPITAL ROOM: TOTAL
WALKING IN HOSPITAL ROOM: TOTAL
DRESSING REGULAR LOWER BODY CLOTHING: A LOT
HELP NEEDED FOR BATHING: A LOT
MOVING FROM LYING ON BACK TO SITTING ON SIDE OF FLAT BED WITH BEDRAILS: A LITTLE
DRESSING REGULAR UPPER BODY CLOTHING: A LOT
TURNING FROM BACK TO SIDE WHILE IN FLAT BAD: A LITTLE
DAILY ACTIVITIY SCORE: 14

## 2024-05-18 ASSESSMENT — PAIN SCALES - GENERAL
PAINLEVEL_OUTOF10: 0 - NO PAIN
PAINLEVEL_OUTOF10: 8
PAINLEVEL_OUTOF10: 0 - NO PAIN

## 2024-05-18 ASSESSMENT — PAIN - FUNCTIONAL ASSESSMENT
PAIN_FUNCTIONAL_ASSESSMENT: 0-10
PAIN_FUNCTIONAL_ASSESSMENT: 0-10

## 2024-05-18 NOTE — PROGRESS NOTES
Edwar Hemphill is a 39 y.o. male on day 18 of admission presenting with SIRS (systemic inflammatory response syndrome) (Multi).      Subjective   No issues        Objective     Last Recorded Vitals  /80 (BP Location: Left arm, Patient Position: Lying)   Pulse 105   Temp 36.7 °C (98.1 °F) (Oral)   Resp 16   Wt 67.7 kg (149 lb 4.8 oz)   SpO2 96%   Intake/Output last 3 Shifts:    Intake/Output Summary (Last 24 hours) at 5/18/2024 1033  Last data filed at 5/17/2024 1730  Gross per 24 hour   Intake 1200 ml   Output 1500 ml   Net -300 ml       Admission Weight  Weight: 65.8 kg (145 lb) (04/30/24 0825)    Daily Weight  05/18/24 : 67.7 kg (149 lb 4.8 oz)    Image Results  Electrocardiogram, 12-lead PRN ACS symptoms  Atrial fibrillation with rapid ventricular response with premature ventricular or aberrantly conducted complexes  Nonspecific ST and T wave abnormality , probably digitalis effect  Abnormal ECG  When compared with ECG of 16-JAN-2024 07:06,  Atrial fibrillation has replaced Sinus rhythm  ST elevation now present in Inferior leads  Nonspecific T wave abnormality, worse in Lateral leads      PHYSICAL EXAM  NEUROLOGICAL: No abnormal movements, no changes from before  HEENT: Head atraumatic, perrl,eomi, no oral lesions, no ear rash   NECK: Supple, no ln, jvp flat, thyroid palp  HEART: S1, S2, no added sound  LUNGS: dec a/e  EXTREMITIES: no edema  ABDOMEN: Soft, nontender, bowel sound positive, no organomegaly  SKIN: No change    Relevant Results               Assessment/Plan        This patient has a central line   Reason for the central line remaining today? Dialysis/Hemapheresis            Principal Problem:    SIRS (systemic inflammatory response syndrome) (Multi)    T2dm  Anemia renal origin   Oa  Pvd        Malnutrition Diagnosis Status: New  Malnutrition Diagnosis: Severe malnutrition related to chronic disease or condition  As Evidenced by: 18% weight loss over 8 months, intake <75% meals for > 1  month.  I agree with the dietitian's malnutrition diagnosis.     Dr sosa will cover from 12 midnight tonight till dc     Mauricio Estrada MD

## 2024-05-18 NOTE — PROGRESS NOTES
"                                 '' Infectious Disease Progress Note''        Interval Events:  No new symptoms  Afebrile  Repeat blood cultures pending      Current antibiotic:  Ceftaroline  Daptomycin    Physical Exam:  /80 (BP Location: Left arm, Patient Position: Lying)   Pulse 105   Temp 36.7 °C (98.1 °F) (Oral)   Resp 16   Ht 1.753 m (5' 9.02\")   Wt 67.7 kg (149 lb 4.8 oz)   SpO2 96%   BMI 22.04 kg/m²   General: NAD, nontoxic appearing  Skin: no new rash  Resp: lungs CTA b/l  CV:  normal S1/S2, no murmur   Abd: soft, non-tender  Ext: no edema,+ R groin TDC, s/p R AKA and RUE amputation      Lab Results:  Reviewed    Micro:  5/7 blood culture: MRSA  5/10 blood culture: MRSA  5/14 blood culture: MRSA  5/17 blood culture: IP      Imaging: reviewed         Assessment:  -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 Not having a full line holiday because of risk of losing HD access. TTE without endocarditis. Repeat blood cx 5/5 and 5/7 still positive. HD cath removed on 5/9. 5/10 blood cx still positive. New temp HD cath placed 5/13. POLI negative for endocarditis 5/14.      -Persistent MRSA bacteremia  -Lumbar spine pain   MRI without VO/discitis  -Diabetes with DKA  -ESRD on HD ([facility])      Plan/Recommendations:  -c/w Daptomycin and ceftaroline as salvage therapy for persistent MRSA bacteremia  -Follow-up repeat blood culture today  -Monitoring for adverse reaction of antibiotics such as rash/itching/diarrhea.      Please call ID with any concerns or questions.      Sheldon Saleh MD  [facility]  #[phone]      "

## 2024-05-18 NOTE — PROGRESS NOTES
Edwar Hemphill is a 39 y.o. male on day 18 of admission presenting with SIRS (systemic inflammatory response syndrome) (Multi).    Subjective   Mr. Hemphill is a 39-year-old man with a history of ESRD on hemodialysis.  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  He has had many bloodstream infections related to catheters, again during this admission.  He has MRSA, currently has a temporary femoral hemodialysis catheter, we are having issues eradicating the bacteremia.  TTE without endocarditis, then had a POLI as well on May 14.  CT was without an epidural abscess, MRI was done that was also negative for infection.       Objective       Physical Exam  Constitutional:       Appearance: Normal appearance.   HENT:      Mouth/Throat:      Mouth: Mucous membranes are moist.   Eyes:      Extraocular Movements: Extraocular movements intact.      Pupils: Pupils are equal, round, and reactive to light.   Cardiovascular:      Rate and Rhythm: Regular rhythm.      Heart sounds: S1 normal and S2 normal.   Pulmonary:      Breath sounds: Poor effort, no crackles  Abdominal:      Comments: Soft, NT/ND, no masses, normal bowel sounds   Genitourinary:     Comments: No dodd  Musculoskeletal:      Right lower leg: No edema.      Left lower leg: No edema.   Right upper extremity amputation  Skin:     General: Skin is warm and dry.   Neurological:      General: No focal deficit present.      Mental Status: She is alert and oriented to person, place, and time.   Psychiatric:         Behavior: Behavior normal.    Right femoral dialysis line noted, temporary    Meds    Current Facility-Administered Medications:     [Held by provider] apixaban (Eliquis) tablet 5 mg, 5 mg, oral, BID, OSBALDO Wilson, DNP, 5 mg at 05/03/24 2702    B complex-vitamin C-folic acid (Nephrocaps) capsule 1 capsule, 1 capsule, oral, Daily, OSBALDO Wilson, DNP, 1 capsule at 05/18/24 7550    ceftaroline (Teflaro)  200 mg in dextrose 5 % in water (D5W) 50 mL IV, 200 mg, intravenous, q8h, Sheldon Saleh MD, Stopped at 05/18/24 0417    cyclobenzaprine (Flexeril) tablet 5 mg, 5 mg, oral, TID PRN, El Estrada MD, 5 mg at 05/16/24 2035    DAPTOmycin (Cubicin) 700 mg/100 mL  mg, 700 mg, intravenous, q48h, Sheldon Saleh MD, Stopped at 05/16/24 1903    dextrose 50 % injection 12.5 g, 12.5 g, intravenous, q15 min PRN, Veronica De La Paz MD    dextrose 50 % injection 25 g, 25 g, intravenous, q15 min PRN, Mike Santana MD    epoetin tyson-epbx (Retacrit) injection 8,000 Units, 100 Units/kg, subcutaneous, Every Mon/Wed/Fri, Eber Bruce, , 8,000 Units at 05/15/24 1820    fentaNYL PF (Sublimaze) injection, , , PRN, Ronaldo Dempsey MD, 25 mcg at 05/13/24 1513    glucagon (Glucagen) injection 1 mg, 1 mg, intramuscular, q15 min PRN, Veronica De La Paz MD    heparin 1,000 unit/mL injection 3,100 Units, 3,100 Units, intra-catheter, After Dialysis, OSBALDO Wilson, DNP, 3,100 Units at 05/17/24 1500    heparin 1,000 unit/mL injection 3,100 Units, 3,100 Units, intra-catheter, After Dialysis, OSBALDO Wilson DNP, 3,100 Units at 05/17/24 1500    heparin 1,000 unit/mL injection, , , PRN, Ronaldo Dempsey MD, 1,800 Units at 05/13/24 2329    insulin glargine (Lantus) injection 2 Units, 2 Units, subcutaneous, Nightly, Mike Santana MD, 2 Units at 05/17/24 2228    insulin lispro (HumaLOG) injection 0-5 Units, 0-5 Units, subcutaneous, TID, Veronica De La Paz MD, 1 Units at 05/16/24 0956    levothyroxine (Synthroid, Levoxyl) tablet 125 mcg, 125 mcg, oral, Daily before breakfast, AMARILYS Wilson-CNP, DNP, 125 mcg at 05/18/24 0600    lidocaine (Xylocaine) 20 mg/mL (2 %) injection, , , PRN, Ronaldo Dempsey MD, 5 mL at 05/13/24 1513    lidocaine PF (Xylocaine) 10 mg/mL (1 %) injection, , , PRN, Ronaldo Dempsey MD, 5 mL at 05/03/24 1816    loperamide (Imodium) capsule 2 mg, 2 mg, oral, q4h PRN,  OSBALDO Wilson, DNP    midazolam (Versed) injection, , , PRN, Ronaldo Dempsey MD, 0.5 mg at 05/13/24 1513    nitroglycerin (Nitrostat) SL tablet 0.4 mg, 0.4 mg, sublingual, q5 min PRN, OSBALDO Wilson, DNP    ondansetron (Zofran) injection 4 mg, 4 mg, intravenous, q6h PRN, OSBALDO Wilson, DNP    oxyCODONE (Roxicodone) immediate release tablet 5 mg, 5 mg, oral, q4h PRN, OSBALDO Wilson, LUCILA, 5 mg at 05/17/24 2236    oxygen (O2) therapy, , , Continuous PRN, Ronaldo Dempsey MD, Last Rate: 120,000 mL/hr at 05/03/24 1758, 2 L/min at 05/03/24 1758    oxygen (O2) therapy, , inhalation, Continuous PRN - O2/gases, OSBALDO Rousseau    oxygen (O2) therapy, , inhalation, Continuous PRN - O2/gases, OSBALDO Rousseau    pantoprazole (ProtoNix) injection 40 mg, 40 mg, intravenous, BID, OSBALDO Marin, 40 mg at 05/18/24 0857    polyethylene glycol (Glycolax, Miralax) packet 17 g, 17 g, oral, Daily, OSBALDO Wilson, DNP, 17 g at 05/04/24 0624    sennosides (Senokot) tablet 8.6 mg, 8.6 mg, oral, BID, OSBALDO Wilson, DNP, 8.6 mg at 05/12/24 2239    sevelamer carbonate (Renvela) tablet 2,400 mg, 2,400 mg, oral, TID, OSBALDO Wilson, DNP, 2,400 mg at 05/18/24 0857    simethicone (Mylicon) chewable tablet 80 mg, 80 mg, oral, q8h PRN, Andi Bazan, APRN-CNP, DNP    sodium zirconium cyclosilicate (Lokelma) packet 10 g, 10 g, oral, Daily, Eber Bruce DO   Medications Discontinued During This Encounter   Medication Reason    loperamide (Imodium A-D) 2 mg tablet Med List Cleanup    piperacillin-tazobactam-dextrose (Zosyn) IV 2.25 g     piperacillin-tazobactam-dextrose (Zosyn) IV 2.25 g     heparin 1,000 unit/mL injection 3,100 Units     heparin 1,000 unit/mL injection 3,100 Units     glucagon (Glucagen) injection 1 mg     dextrose 50 % injection 25 g     glucagon (Glucagen) injection 1 mg      "dextrose 50 % injection 12.5 g     insulin lispro (HumaLOG) injection 0-5 Units     melatonin tablet 3 mg     insulin regular 100 unit/100 mL (1 unit/mL) in 0.9 % NaCl infusion     insulin regular (HumuLIN, NovoLIN) bolus from bag 2-6 Units     dextrose 5 % and lactated Ringer's infusion     vancomycin (Vancocin) pharmacy to dose - pharmacy monitoring Duplicate order    epoetin tyson-epbx (Retacrit) injection 8,000 Units Availability    insulin glargine (Lantus) injection 10 Units     epoetin tyson-epbx (Retacrit) injection 8,000 Units     norepinephrine (Levophed) 8 mg in dextrose 5% 250 mL (0.032 mg/mL) infusion (premix)     insulin glargine (Lantus) injection 5 Units     oxygen (O2) therapy     insulin glargine (Lantus) injection 4 Units     atorvastatin (Lipitor) tablet 40 mg     DAPTOmycin (Cubicin) 450 mg in sodium chloride 0.9% 50 mL IV     vancomycin (Vancocin) pharmacy to dose - pharmacy monitoring     sodium chloride 0.9% infusion         Allergies  Allergies   Allergen Reactions    Cashew Nut Anaphylaxis and Swelling     cashews        Last Recorded Vitals  Blood pressure 137/80, pulse 105, temperature 36.7 °C (98.1 °F), temperature source Oral, resp. rate 16, height 1.753 m (5' 9.02\"), weight 67.7 kg (149 lb 4.8 oz), SpO2 96%.  Intake/Output last 3 Shifts:  I/O last 3 completed shifts:  In: 3941.7 (58.2 mL/kg) [I.V.:3391.7 (50.1 mL/kg); Other:400; IV Piggyback:150]  Out: 1500 (22.1 mL/kg) [Other:1500]  Weight: 67.7 kg     Last 24 hour Results  Results for orders placed or performed during the hospital encounter of 04/30/24 (from the past 24 hour(s))   POCT GLUCOSE   Result Value Ref Range    POCT Glucose 171 (H) 74 - 99 mg/dL   Blood Culture    Specimen: Peripheral Venipuncture; Blood culture   Result Value Ref Range    Blood Culture Loaded on Instrument - Culture in progress    Blood Culture    Specimen: Dialysis; Blood culture   Result Value Ref Range    Blood Culture Loaded on Instrument - Culture in " progress    POCT GLUCOSE   Result Value Ref Range    POCT Glucose 142 (H) 74 - 99 mg/dL   Renal function panel   Result Value Ref Range    Glucose 108 (H) 74 - 99 mg/dL    Sodium 136 136 - 145 mmol/L    Potassium 3.6 3.5 - 5.3 mmol/L    Chloride 99 98 - 107 mmol/L    Bicarbonate 28 21 - 32 mmol/L    Anion Gap 13 10 - 20 mmol/L    Urea Nitrogen 15 6 - 23 mg/dL    Creatinine 3.83 (H) 0.50 - 1.30 mg/dL    eGFR 19 (L) >60 mL/min/1.73m*2    Calcium 8.1 (L) 8.6 - 10.3 mg/dL    Phosphorus 3.2 2.5 - 4.9 mg/dL    Albumin 2.1 (L) 3.4 - 5.0 g/dL   POCT GLUCOSE   Result Value Ref Range    POCT Glucose 128 (H) 74 - 99 mg/dL   POCT GLUCOSE   Result Value Ref Range    POCT Glucose 103 (H) 74 - 99 mg/dL        Imaging results  Electrocardiogram, 12-lead PRN ACS symptoms    Result Date: 5/17/2024  Atrial fibrillation with rapid ventricular response with premature ventricular or aberrantly conducted complexes Nonspecific ST and T wave abnormality , probably digitalis effect Abnormal ECG When compared with ECG of 16-JAN-2024 07:06, Atrial fibrillation has replaced Sinus rhythm ST elevation now present in Inferior leads Nonspecific T wave abnormality, worse in Lateral leads    IR CVC removal    Result Date: 5/16/2024  These images are not reportable by radiology and will not be interpreted by  Radiologists.    Transesophageal Echo (POLI)    Result Date: 5/14/2024   [facility], [address] [place] [address]              Tel [phone] and Fax [phone] TRANSESOPHAGEAL ECHOCARDIOGRAM REPORT  Patient Name:      XIOMARA Maya Physician:    [id number] Edward Hernandez DO Study Date:        5/14/2024           Ordering Provider:    [id number] VIEYRA MRN/PID:           [id number]            Fellow: Accession#:        [id number]        Nurse:                Ronaldo Woo RN Date  of Birth/Age: 1985 / 39      Sonographer:          Alejandro Carroll RDCS                    years Gender:            M                   Additional Staff:     Kayden Matthews CRNA Height:            175.26 cm           Admit Date:           4/30/2024 Weight:            72.58 kg            Admission Status:     Inpatient -                                                              Routine BSA / BMI:         1.88 m2 / 23.63     Encounter#:           [id number]                    kg/m2                                        Department Location:  [facility]                                                              Invasive Blood Pressure: 116 /69 mmHg Study Type:    TRANSESOPHAGEAL ECHO (POLI) Diagnosis/ICD: Bacteremia-R78.81 Indication:    Staphyococcus aureus bacteremia, R/o endocarditis CPT Code:      POLI Complete-28173; Doppler Limited-45804; Color Doppler-33410 Patient History: Allergies:         Cashew nuts. Smoker:            Unknown. Diabetes:          Yes Insulin Pertinent History: HTN, Hyperlipidemia and PVD. 39 y.o. male presents for POLI                    for endocarditis rule out.                     PMH of ESRD and SIRS. Study Detail: The following Echo studies were performed: 2D. Agitated saline               used as a contrast agent for intraseptal flow evaluation. Total               contrast used for this procedure was 10 mL via IV push. Patient's               heart rhythm is sinus tachycardia. The patient was sedated.  PHYSICIAN INTERPRETATION: POLI Details: The POLI probe used was 5177. Technically adequate omniplane transesophageal echocardiogram performed. POLI Medication: The pharynx was anesthetized with Cetacaine spray and viscous lidocaine. The patient was sedated by Anesthesia; please refer to anesthesia flow sheet for medications used. POLI Procedure: The probe was passed without difficulty. Left Ventricle: The left ventricular systolic function is mildly decreased, with an estimated  ejection fraction of 35-40%. Wall motion is abnormal. The left ventricular cavity size was not assessed. Left ventricular diastolic filling was not assessed. Left Atrium: The left atrium is normal in size. There is no definite left atrial thrombus present. A bubble study using agitated saline was performed. Bubble study is negative. There is a normal sized left atrial appendage. Right Ventricle: The right ventricle is normal in size. There is normal right ventricular global systolic function. Right Atrium: The right atrium is normal in size. Aortic Valve: There is no visualized aortic valve vegetation. The aortic valve appears structurally normal. There is no evidence of aortic valve regurgitation. Mitral Valve: The mitral valve is normal in structure. There was no mitral valve vegetation visualized. There is trace mitral valve regurgitation. Tricuspid Valve: No vegetation is seen on the tricuspid valve. The tricuspid valve is structurally normal. There is trace tricuspid regurgitation. Pulmonic Valve: No pulmonic valve vegetation visualized. The pulmonic valve is structurally normal. There is no indication of pulmonic valve regurgitation. Pericardium: There is no pericardial effusion noted. Aorta: The aortic root is normal.  CONCLUSIONS:  1. Left ventricular systolic function is mildly decreased with a 35-40% estimated ejection fraction.  2. No mitral valve vegetation visualized.  3. No tricuspid valve vegetation.  4. No aortic valve vegetation visualized.  5. No pulmonic valve vegetation visualized.  6. No left atrial thrombus. QUANTITATIVE DATA SUMMARY:  [id number] Edward David CASEY Electronically signed on 5/14/2024 at 7:06:59 PM  ** Final **     IR CVC tunneled    Result Date: 5/13/2024  Interpreted By:  Ronaldo Dempsey, STUDY: IR CVC TUNNELED; ;  5/13/2024 3:26 pm   ULTRASOUND FLUOROSCOPICALLY GUIDED RIGHT GROIN TEMPORARY HEMODIALYSIS CATHETER PLACEMENT   INDICATION: Signs/Symptoms:Temporary dialysis line  today. 39-year-old man with end-stage renal disease, persistent bacteremia despite removal of groin tunneled hemodialysis catheter. Requires replacement of hemodialysis access.   COMPARISON: Fluoroscopic images from prior tunneled hemodialysis catheter exchange 05/13/2024   ACCESSION NUMBER(S): [id number]   ORDERING CLINICIAN: BRADEN QUIROGA   TECHNIQUE:   ATTENDING : Ronaldo Dempsey M.D.   TECHNICAL DESCRIPTION/FINDINGS: The procedure, including all risks, benefits and alternatives were explained to the patient in detail. All questions were answered and written informed consent was obtained.   Patient was positioned supine on the fluoroscopy table. A time-out was performed.   The bilateral groins were prepped and draped in usual sterile fashion. Focused ultrasound was performed over the superior aspect of the right common femoral vein demonstrating superior patency with partial compressibility. Ultrasound images were saved. 1% lidocaine was administered subcutaneously for local anesthesia. Under real-time ultrasound guidance, the right common femoral vein was accessed in antegrade direction using micropuncture technique. Ultrasound images were saved. Under fluoroscopic visualization, an 018 guidewire was advanced into the right common iliac vein and micropuncture transitional introducer was utilized for placement of an 035 guidewire into the IVC. After serial dilation, a 13 Zambian by 20 cm Trialysis catheter was then placed. A completion fluoroscopic image was obtained demonstrating the tip of the temporary hemodialysis catheter projecting over the IV C.   Catheter lumens easily aspirated and flushed. Large-bore dialysis lumens were locked with a concentrated heparinized solution (1000 units/cc). A small bore 3rd lumen was locked with a dilute heparinized solution. The catheter hub was sutured to the skin with 2 0 Prolene stay suture. Sterile dressing was applied.   SEDATION/MEDICATIONS: Continuous  cardiopulmonary monitoring was performed by a radiology nurse for the duration of the procedure. 0.5 mg Versed and   25 mcg Fentanyl were administered for moderate conscious sedation time of 20 minutes.      10 cc 1% Lidocaine was administered subcutaneously for local anesthesia. SPECIMENS: None. ESTIMATED BLOOD LOSS:  5 cc FLOUROSCOPY:  0.1 minutes; DAP  2405 mGy-cm*2; Air Kerma  6.58 mGy CONTRAST: None.   FINDINGS: Test       Ultrasound fluoroscopically guided right groin temporary hemodialysis catheter. The catheter is ready for immediate use.     MACRO: None   Signed by: Ronaldo Dempsey 5/13/2024 4:59 PM Dictation workstation:   [id number]    MR lumbar spine wo IV contrast    Result Date: 5/4/2024  Interpreted By:  Eva Og and Muddasani Dheeraj STUDY: MR LUMBAR SPINE WO IV CONTRAST   INDICATION: Signs/Symptoms:back pain new with bacteremia, possible infection   COMPARISON: CT of the lumbar spine 04/30/2024. Body CT 01/14/2024.   ACCESSION NUMBER(S): [id number]   ORDERING CLINICIAN: ALKA VIEYRA   TECHNIQUE: Sagittal T1, sagittal T2, sagittal STIR, axial T1 and axial T2 weighted MRI images of the lumbar spine were obtained without intravenous contrast administration.   FINDINGS: Image quality is somewhat degraded due to motion and technique.   There are 5 non-rib-bearing lumbar-type vertebral bodies. The last well-formed intervertebral disc is labeled L5-S1.   Minimal retrolisthesis at L5-S1. Alignment is otherwise normal.   Vertebral body heights are maintained. Disc spaces are relatively maintained.   There is diffusely hypointense bone marrow signal on the T1 weighted images which may be related to renal osteodystrophy. Differential considerations include anemia, reactive marrow or bone marrow infiltrating process.   There is no increased STIR signal to suggest marrow edema. Multilevel degenerative endplate changes include prominent Schmorl's nodes most pronounced at L3-4 where there are Modic  type 2 changes and central intradiscal T1 and T2 hypointense signal likely degenerative.   The conus medullaris terminates at L1-2. The visualized spinal cord, conus medullaris and cauda equina demonstrate normal signal and morphology.   Fatty atrophic changes involve the paravertebral musculature. Partially visualized kidneys demonstrate atrophic changes with multiple bilateral cysts, better characterized on previous body CT dated 01/14/2024. Partially visualized catheter within the inferior vena cava and right common iliac vein.   T10-11 on sagittal images only: No spinal canal or neural foraminal stenosis.   T11-12 on sagittal images only: No spinal canal or neural foraminal stenosis.   T12-L1: Facet arthropathy and ligamentum flavum thickening. No spinal canal or neural foraminal stenosis.   L1-2: Facet arthropathy and ligamentum flavum thickening. No spinal canal or neural foraminal stenosis.   L2-3: Facet arthropathy and ligamentum flavum thickening. No spinal canal or neural foraminal stenosis.   L3-4: Disc bulge, facet arthropathy and ligamentum flavum thickening. No spinal canal or neural foraminal stenosis.   L4-5: Disc bulge with small central protrusion, facet arthropathy and ligamentum flavum thickening. No spinal canal stenosis. Mild bilateral neural foraminal stenosis.   L5-S1: Disc bulge, facet arthropathy and ligamentum flavum thickening. No spinal canal stenosis. Mild right and no left neural foraminal stenosis.   Degenerative changes are noted at the sacroiliac joints.       Diffusely hypointense bone marrow signal on the T1 weighted images may be related to renal osteodystrophy. Differential considerations include anemia, reactive bone marrow or bone marrow infiltrating process.   No definite imaging findings to suggest discitis osteomyelitis on the current exam.   Degenerative changes without significant spinal canal stenosis. Varying degrees of mild neural foraminal narrowing as detailed above.    I personally reviewed the images/study and I agree with the findings as stated by Dr. Lowe.   MACRO: None   Signed by: Eva Earle 5/4/2024 11:11 AM Dictation workstation:   [id number]    IR CVC exchange    Result Date: 5/3/2024  Interpreted By:  Ronaldo Dempsey, STUDY: IR CVC EXCHANGE; ;  5/3/2024 6:28 pm   FLUOROSCOPICALLY GUIDED EXCHANGE OF RIGHT GROIN TUNNELED HEMODIALYSIS CATHETER   INDICATION: Signs/Symptoms:Bacteremia- Right fem TDC exchange. 39-year-old man with end-stage renal disease, central thoracic venous occlusion, end-stage vascular access with bacteremia. Guidewire exchange of right groin tunneled hemodialysis catheter in an attempt to salvage the access.   COMPARISON: Chest radiograph 04/30/2024, fluoroscopic images from prior tunneled hemodialysis catheter placement 01/22/2024   ACCESSION NUMBER(S): [id number]   ORDERING CLINICIAN: SACHI MORTENSEN   TECHNIQUE:   ATTENDING : Ronaldo Dempsey M.D.   TECHNICAL DESCRIPTION/FINDINGS: The procedure, including all risks, benefits and alternatives were explained to the patient in detail. All questions were answered and written informed consent was obtained.   The patient was positioned supine on the angiography table. A time-out was performed.   The right groin was prepped and draped in usual sterile fashion. A  fluoroscopic image was obtained demonstrating the tunneled hemodialysis catheter terminating at the inferior cavoatrial junction.   1% lidocaine was administered via the subcutaneous tunnel tract for local anesthesia. Blunt dissection was performed to free the subcutaneous catheter cuff. An 035 stiff Glidewire was then advanced through the catheter into the right atrium. The old catheter was removed. Over the guidewire, a new 14.5 Brazilian by 42 cm tip to cuff dual-lumen hemodialysis catheter was then placed. A completion fluoroscopic image demonstrates the catheter in the inferior right atrium.   Catheter lumens  easily aspirated and flushed and were locked with a concentrated heparinized solution (1000 units/cc). The catheter hub was then sutured to the skin with 2 0 Prolene stay suture. Because of losing along the tract, Gel-Foam pledgets were then administered subcutaneously near where the catheter enters the skin for additional hemostasis, followed by a pursestring suture. A sterile dressing was applied.   SEDATION/MEDICATIONS: Continuous cardiopulmonary monitoring was performed by a radiology nurse for the duration of the procedure.  1 mg Versed and   50 mcg Fentanyl were administered for moderate conscious sedation time of 20 minutes.      10 cc 1% Lidocaine was administered subcutaneously for local anesthesia. SPECIMENS: None. ESTIMATED BLOOD LOSS:  5 cc FLOUROSCOPY:  1.2 minutes; DAP  99616 mGy-cm*2; Air Kerma  59.07 mGy CONTRAST: None.   FINDINGS: Test       Fluoroscopically guidewire exchange of right groin tunneled hemodialysis catheter. The newly exchanged catheter is ready for immediate use.     MACRO: None   Signed by: Ronaldo Dempsey 5/3/2024 8:09 PM Dictation workstation:   [id number]    Transthoracic Echo Complete    Result Date: 5/3/2024   [facility], [address] [place] [address]              Tel [phone] and Fax [phone] TRANSTHORACIC ECHOCARDIOGRAM REPORT  Patient Name:      XIOMARA Maya Physician:    [id number] Brandon Nur DO Study Date:        5/3/2024             Ordering Provider:    [id number] ALKA VIEYRA MRN/PID:           [id number]             Fellow: Accession#:        [id number]         Nurse: Date of Birth/Age: 1985 / 39 years Sonographer:          Sy Lyons RUST Gender:            M                    Additional Staff: Height:            175.00 cm            Admit Date: Weight:            65.80 kg             Admission Status:     Inpatient -                                                                Routine BSA / BMI:         1.80 m2 / 21.49      Encounter#:           [id number]                    kg/m2                                         Department Location:  [facility] Non                                                               Invasive Blood Pressure: 112 /74 mmHg Study Type:    TRANSTHORACIC ECHO (TTE) COMPLETE Diagnosis/ICD: Endocarditis, valve unspecified-I38 Indication:    endocarditis CPT Code:      Echo Complete w Full Doppler-10035 Patient History: Pertinent History: HTN and Hyperlipidemia. Study Detail: The following Echo studies were performed: M-Mode, Doppler, color               flow and 2D.  PHYSICIAN INTERPRETATION: Left Ventricle: The left ventricular systolic function is moderately decreased, with an estimated ejection fraction of 35-40%. There are no regional wall motion abnormalities. The left ventricular cavity size is normal. Abnormal (paradoxical) septal motion, consistent with left bundle branch block. Spectral Doppler shows an impaired relaxation pattern of left ventricular diastolic filling. Left Atrium: The left atrium is normal in size. Right Ventricle: The right ventricle is normal in size. There is normal right ventricular global systolic function. Right Atrium: The right atrium is normal in size. Aortic Valve: The aortic valve is probably trileaflet. There is no evidence of aortic valve regurgitation. Mitral Valve: The mitral valve is mildly thickened. There is trace mitral valve regurgitation. Tricuspid Valve: The tricuspid valve is structurally normal. No evidence of tricuspid regurgitation. Pulmonic Valve: The pulmonic valve is structurally normal. There is no indication of pulmonic valve regurgitation. Pericardium: There is no pericardial effusion noted. Aorta: The aortic root is normal. In comparison to the previous echocardiogram(s): Compared with study from 1/16/2024, LV function has declined. Was previously normal.  CONCLUSIONS:  1. Left  ventricular systolic function is moderately decreased with a 35-40% estimated ejection fraction.  2. Abnormal septal motion consistent with left bundle branch block.  3. Spectral Doppler shows an impaired relaxation pattern of left ventricular diastolic filling.  4. No vegetations visualized within the limitations of this study.  5. Compared with study from 1/16/2024, LV function has declined. Was previously normal. QUANTITATIVE DATA SUMMARY: LA VOLUME:                               Normal Ranges: LA Vol A4C:        40.1 ml    (22+/-6mL/m2) LA Vol A2C:        35.3 ml LA Vol BP:         40.9 ml LA Vol Index A4C:  22.3ml/m2 LA Vol Index A2C:  19.6 ml/m2 LA Vol Index BP:   22.7 ml/m2 LA Area A4C:       15.3 cm2 LA Area A2C:       13.2 cm2 LA Major Axis A4C: 5.0 cm LA Major Axis A2C: 4.2 cm LA Volume Index:   22.7 ml/m2  [id number] Bradnon Nur DO Electronically signed on 5/3/2024 at 4:24:04 PM  ** Final **     Lower extremity venous duplex right    Result Date: 5/1/2024             [place] [address]   Tel [phone] and Fax [phone]  Vascular Lab Report [facility] US LOWER EXTREMITY VENOUS DUPLEX RIGHT  Patient Name:      XIOMARA Maya Physician:  [id number] Rayo Jauregui MD, RPVI Study Date:        4/30/2024            Ordering Physician: [id number] PANIAGUA MRN/PID:           [id number]             Technologist:       Megan Shay RVCARIN Accession#:        [id number]         Technologist 2: Date of Birth/Age: 1985 / 39 years Encounter#:         [id number] Gender:            M Admission Status:  Emergency            Location Performed: [facility]  Diagnosis/ICD: Pain in right leg-M79.604 CPT Codes:     13361 Peripheral venous duplex scan for DVT Limited  **CRITICAL RESULT** Critical Result: Acute DVT in the right leg. Notification called to Deep Paniagua PA-C on 4/30/2024 at 10:52:21 AM by Megan  Jaspal LANGFORD, RVT.  CONCLUSIONS: Right Lower Venous: There is acute non-occlusive deep vein thrombosis visualized in the common femoral vein. There are chronic changes visualized in the popliteal vein. Enlarged lymph node noted in the right groin. Waveforms suggestive of more distal obstruction/thrombus. May wish further means of imaging evaluation. Left Lower Venous: There are chronic changes visualized in the common femoral vein.  Comparison: Compared with study from 7/27/2023, New finding of acute non-occlusive thrombus in the right CFV.  Imaging & Doppler Findings:  Right                 Compressible      Thrombus          Flow Distal External Iliac                     None CFV                     Partial    Acute non-occlusive Continuous PFV                       Yes             None FV Proximal               Yes             None         Continuous FV Mid                    Yes             None FV Distal                 Yes             None Popliteal                 Yes            Chronic       Continuous Peroneal                  Yes             None PTV                       Yes             None  Left Compress Thrombus        Flow CFV    Yes    Chronic  Spontaneous/Phasic  [id number] Rayo Jauregui MD, MARIANO Electronically signed by [id number] MARIANO Call MD on 5/1/2024 at 9:13:07 AM  ** Final **     XR chest 1 view    Result Date: 4/30/2024  STUDY: Chest Radiograph;  4/30/2024 at 2:42 PM. INDICATION: Fever. COMPARISON: XR chest 1/14/2024, 11/10/2023; CTA chest 2/15/2023. ACCESSION NUMBER(S): [id number] ORDERING CLINICIAN: WENDY INFANTE TECHNIQUE:  Frontal chest was obtained at 14:42 hours. FINDINGS: CARDIOMEDIASTINAL SILHOUETTE: Cardiomediastinal silhouette is normal in size and configuration.  LUNGS: Patchy right basilar infiltrate or atelectasis with small right pleural effusion. Left basilar scar versus atelectasis. ABDOMEN: No remarkable upper abdominal findings.  BONES: No acute osseous changes.    Patchy  right basilar infiltrate or atelectasis with small right pleural effusion. Signed by Joseph Overton MD    CT thoracic spine wo IV contrast    Result Date: 4/30/2024  STUDY: CT Thoracic Spine and Lumbar Spine without IV Contrast; 4/30/2024 11:11 AM INDICATION: Midline back pain. COMPARISON: None Available. ACCESSION NUMBER(S): [id number], [id number] ORDERING CLINICIAN: RAJNI FARMER TECHNIQUE:  CT of the thoracic spine and lumbar spine was performed without intravenous or intrathecal contrast.  Sagittal and coronal reconstructions were generated.  Automated mA/kV exposure control was utilized and patient examination was performed in strict accordance with principles of ALARA. FINDINGS: Portions of the T1 vertebral body, as well as a small portion of the anterior superior aspect of T2 is cut off on this study.  No compression deformities are visualized within the thoracic vertebral bodies from T2 through T12.  No spondylolisthesis is noted.  No pedicular defects are noted.  No prominent edema is appreciated within the adjacent posterior paravertebral musculature.  Evaluation of the thecal sac is limited due to lack of myelographic contrast.  There are enlarged lymph nodes seen within the visualized portions of the mediastinum.  There are bilateral lower lobe infiltrates with tiny bilateral effusions.  Coronary artery calcifications are present. No compression deformities are visualized within the lumbar spine.  No pars defects are noted.  No significant spondylolisthesis is seen.  No prominent edema is appreciated within the visualized portions of the psoas musculature.  A long dialysis catheter is visualized within the inferior vena cava.  Evaluation of the thecal sac is limited due to lack of myelographic contrast.  There is disc bulging seen from L3 through S1.  No significant impingement is appreciated upon the neural foramina on the sagittal reconstructions.    Thoracic spine: 1.  Portions of T1 and T2 cough on  this study. 2.  No acute osseous findings seen from T3 through T12. 3.  Tiny bilateral effusions with adjacent lower lobe infiltrates. 4.  The distal adenopathy. 5.  Coronary artery calcifications. Lumbar spine: 1.  No compression deformities or spinal listhesis is noted. 2.  Disc bulging from L3 to S1.  Evaluation for spinal stenosis is limited due to lack mammographic contrast. 3.  Incidental note of a long dialysis catheter within the inferior vena cava. Signed by Deejay Hoffman MD    CT lumbar spine wo IV contrast    Result Date: 4/30/2024  STUDY: CT Thoracic Spine and Lumbar Spine without IV Contrast; 4/30/2024 11:11 AM INDICATION: Midline back pain. COMPARISON: None Available. ACCESSION NUMBER(S): [id number], [id number] ORDERING CLINICIAN: RAJNI FARMER TECHNIQUE:  CT of the thoracic spine and lumbar spine was performed without intravenous or intrathecal contrast.  Sagittal and coronal reconstructions were generated.  Automated mA/kV exposure control was utilized and patient examination was performed in strict accordance with principles of ALARA. FINDINGS: Portions of the T1 vertebral body, as well as a small portion of the anterior superior aspect of T2 is cut off on this study.  No compression deformities are visualized within the thoracic vertebral bodies from T2 through T12.  No spondylolisthesis is noted.  No pedicular defects are noted.  No prominent edema is appreciated within the adjacent posterior paravertebral musculature.  Evaluation of the thecal sac is limited due to lack of myelographic contrast.  There are enlarged lymph nodes seen within the visualized portions of the mediastinum.  There are bilateral lower lobe infiltrates with tiny bilateral effusions.  Coronary artery calcifications are present. No compression deformities are visualized within the lumbar spine.  No pars defects are noted.  No significant spondylolisthesis is seen.  No prominent edema is appreciated within the visualized  portions of the psoas musculature.  A long dialysis catheter is visualized within the inferior vena cava.  Evaluation of the thecal sac is limited due to lack of myelographic contrast.  There is disc bulging seen from L3 through S1.  No significant impingement is appreciated upon the neural foramina on the sagittal reconstructions.    Thoracic spine: 1.  Portions of T1 and T2 cough on this study. 2.  No acute osseous findings seen from T3 through T12. 3.  Tiny bilateral effusions with adjacent lower lobe infiltrates. 4.  The distal adenopathy. 5.  Coronary artery calcifications. Lumbar spine: 1.  No compression deformities or spinal listhesis is noted. 2.  Disc bulging from L3 to S1.  Evaluation for spinal stenosis is limited due to lack mammographic contrast. 3.  Incidental note of a long dialysis catheter within the inferior vena cava. Signed by Deejay Hoffman MD    XR pelvis 1-2 views    Result Date: 4/30/2024  STUDY: Pelvis Radiographs; 4/30/2024 at 9:06 AM. INDICATION: Pain. COMPARISON: CT pelvis 7/17/2023. ACCESSION NUMBER(S): [id number] ORDERING CLINICIAN: RAJNI FARMER TECHNIQUE:  One view(s) of the pelvis. FINDINGS:  The pelvic ring is intact.  There is no acute fracture.  Severe underlying osteopenia.    Slightly limited secondary to underlying osteopenia.  No acute osseous abnormality. Signed by Roe Ruiz MD       Assessment and Plan  Mr. Hemphill is a 39-year-old man with a history of ESRD on hemodialysis.  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  He has had many bloodstream infections related to catheters, again during this admission.  He has MRSA, currently has a temporary femoral hemodialysis catheter, we are having issues eradicating the bacteremia.  TTE without endocarditis, then had a POLI as well on May 14.  CT was without an epidural abscess, MRI was done that was also negative for infection.    He was dialyzed yesterday without event.  Infectious disease is  managing the antibiotics.  Antibiotics noted, blood cultures were ordered again today.  His next dialysis is on Monday.       I spent a total of 40 minutes with this patient today.    Zeeshan Gonzalez MD

## 2024-05-18 NOTE — PROGRESS NOTES
"Edwar Hemphill is a 39 y.o. male on day 18 of admission presenting with SIRS (systemic inflammatory response syndrome) (Multi).    Subjective   No new complaint  Moved bowels last night     Scheduled medications  [Held by provider] apixaban, 5 mg, oral, BID  B complex-vitamin C-folic acid, 1 capsule, oral, Daily  ceftaroline, 200 mg, intravenous, q8h  DAPTOmycin, 700 mg, intravenous, q48h  epoetin tyson or biosimilar, 100 Units/kg, subcutaneous, Every Mon/Wed/Fri  heparin, 3,100 Units, intra-catheter, After Dialysis  heparin, 3,100 Units, intra-catheter, After Dialysis  insulin glargine, 2 Units, subcutaneous, Nightly  insulin lispro, 0-5 Units, subcutaneous, TID  levothyroxine, 125 mcg, oral, Daily before breakfast  pantoprazole, 40 mg, intravenous, BID  polyethylene glycol, 17 g, oral, Daily  sennosides, 8.6 mg, oral, BID  sevelamer carbonate, 2,400 mg, oral, TID  sodium zirconium cyclosilicate, 10 g, oral, Daily      Continuous medications  oxygen, , Last Rate: 2 L/min (05/03/24 1758)    Objective   Physical Exam  Constitutional:       General: He is not in acute distress.  Neurological:      Mental Status: He is alert.         Last Recorded Vitals  Blood pressure 154/87, pulse 105, temperature 36.7 °C (98 °F), resp. rate 16, height 1.753 m (5' 9.02\"), weight 67.7 kg (149 lb 4.8 oz), SpO2 96%.  Intake/Output last 3 Shifts:  I/O last 3 completed shifts:  In: 3941.7 (52.6 mL/kg) [I.V.:3391.7 (45.3 mL/kg); Other:400; IV Piggyback:150]  Out: 1500 (20 mL/kg) [Other:1500]  Weight: 74.9 kg     Relevant Results  Results from last 7 days   Lab Units 05/17/24  2134 05/17/24  1521 05/17/24  1335 05/17/24  0949 05/16/24  2100 05/16/24  1545 05/14/24  0831 05/14/24  0625 05/12/24  0754 05/12/24 0710 05/11/24  0805 05/11/24  0631   POCT GLUCOSE mg/dL 128*  --  142* 171* 195* 103*   < >  --    < >  --    < >  --    GLUCOSE mg/dL  --  108*  --   --   --   --   --  176*  --  164*  --  122*    < > = values in this interval not " displayed.     Assessment/Plan   Principal Problem:    SIRS (systemic inflammatory response syndrome) (Multi)    IMPRESSION  DIABETIC KETOACIDOSIS, RESOLVED  TYPE 2 DIABETES MELLITUS  LONG TERM CURRENT INSULIN USE  Glucose well controlled on glargine 2 units at bedtime, little to no lispro    RECOMMENDATIONS  Recomend insulin glargine 2 units at bedtime, for now  Insulin lispro scale QAC      Mike Santana MD

## 2024-05-18 NOTE — CARE PLAN
The patient's goals for the shift include      The clinical goals for the shift include patient will remain safe throughout this shift

## 2024-05-19 LAB
ALBUMIN SERPL BCP-MCNC: 2.3 G/DL (ref 3.4–5)
ANION GAP SERPL CALC-SCNC: 15 MMOL/L (ref 10–20)
BACTERIA BLD AEROBE CULT: ABNORMAL
BACTERIA BLD CULT: ABNORMAL
BUN SERPL-MCNC: 15 MG/DL (ref 6–23)
CALCIUM SERPL-MCNC: 8.7 MG/DL (ref 8.6–10.3)
CHLORIDE SERPL-SCNC: 98 MMOL/L (ref 98–107)
CO2 SERPL-SCNC: 23 MMOL/L (ref 21–32)
CREAT SERPL-MCNC: 4.76 MG/DL (ref 0.5–1.3)
EGFRCR SERPLBLD CKD-EPI 2021: 15 ML/MIN/1.73M*2
ERYTHROCYTE [DISTWIDTH] IN BLOOD BY AUTOMATED COUNT: 17.5 % (ref 11.5–14.5)
GLUCOSE BLD MANUAL STRIP-MCNC: 106 MG/DL (ref 74–99)
GLUCOSE BLD MANUAL STRIP-MCNC: 65 MG/DL (ref 74–99)
GLUCOSE BLD MANUAL STRIP-MCNC: 90 MG/DL (ref 74–99)
GLUCOSE SERPL-MCNC: 124 MG/DL (ref 74–99)
GRAM STN SPEC: ABNORMAL
HCT VFR BLD AUTO: 25.7 % (ref 41–52)
HGB BLD-MCNC: 7.9 G/DL (ref 13.5–17.5)
HOLD SPECIMEN: NORMAL
MCH RBC QN AUTO: 27.4 PG (ref 26–34)
MCHC RBC AUTO-ENTMCNC: 30.7 G/DL (ref 32–36)
MCV RBC AUTO: 89 FL (ref 80–100)
NRBC BLD-RTO: 0 /100 WBCS (ref 0–0)
PHOSPHATE SERPL-MCNC: 4.6 MG/DL (ref 2.5–4.9)
PLATELET # BLD AUTO: 207 X10*3/UL (ref 150–450)
POTASSIUM SERPL-SCNC: 3.9 MMOL/L (ref 3.5–5.3)
RBC # BLD AUTO: 2.88 X10*6/UL (ref 4.5–5.9)
SODIUM SERPL-SCNC: 132 MMOL/L (ref 136–145)
WBC # BLD AUTO: 5.8 X10*3/UL (ref 4.4–11.3)

## 2024-05-19 PROCEDURE — C9113 INJ PANTOPRAZOLE SODIUM, VIA: HCPCS

## 2024-05-19 PROCEDURE — 36415 COLL VENOUS BLD VENIPUNCTURE: CPT | Performed by: INTERNAL MEDICINE

## 2024-05-19 PROCEDURE — 82947 ASSAY GLUCOSE BLOOD QUANT: CPT

## 2024-05-19 PROCEDURE — 2500000002 HC RX 250 W HCPCS SELF ADMINISTERED DRUGS (ALT 637 FOR MEDICARE OP, ALT 636 FOR OP/ED): Performed by: INTERNAL MEDICINE

## 2024-05-19 PROCEDURE — 1200000002 HC GENERAL ROOM WITH TELEMETRY DAILY

## 2024-05-19 PROCEDURE — 80069 RENAL FUNCTION PANEL: CPT | Performed by: INTERNAL MEDICINE

## 2024-05-19 PROCEDURE — 2500000004 HC RX 250 GENERAL PHARMACY W/ HCPCS (ALT 636 FOR OP/ED)

## 2024-05-19 PROCEDURE — 85027 COMPLETE CBC AUTOMATED: CPT | Performed by: INTERNAL MEDICINE

## 2024-05-19 PROCEDURE — 2500000001 HC RX 250 WO HCPCS SELF ADMINISTERED DRUGS (ALT 637 FOR MEDICARE OP): Performed by: NURSE PRACTITIONER

## 2024-05-19 PROCEDURE — 2500000004 HC RX 250 GENERAL PHARMACY W/ HCPCS (ALT 636 FOR OP/ED): Performed by: INTERNAL MEDICINE

## 2024-05-19 PROCEDURE — 2500000006 HC RX 250 W HCPCS SELF ADMINISTERED DRUGS (ALT 637 FOR ALL PAYERS): Mod: MUE | Performed by: NURSE PRACTITIONER

## 2024-05-19 RX ADMIN — INSULIN GLARGINE 2 UNITS: 100 INJECTION, SOLUTION SUBCUTANEOUS at 21:16

## 2024-05-19 RX ADMIN — SEVELAMER CARBONATE 2400 MG: 800 TABLET, FILM COATED ORAL at 09:04

## 2024-05-19 RX ADMIN — SEVELAMER CARBONATE 2400 MG: 800 TABLET, FILM COATED ORAL at 18:01

## 2024-05-19 RX ADMIN — PANTOPRAZOLE SODIUM 40 MG: 40 INJECTION, POWDER, FOR SOLUTION INTRAVENOUS at 21:16

## 2024-05-19 RX ADMIN — NEPHROCAP 1 CAPSULE: 1 CAP ORAL at 09:04

## 2024-05-19 RX ADMIN — CEFTAROLINE FOSAMIL 200 MG: 600 POWDER, FOR SOLUTION INTRAVENOUS at 04:17

## 2024-05-19 RX ADMIN — CEFTAROLINE FOSAMIL 200 MG: 600 POWDER, FOR SOLUTION INTRAVENOUS at 11:32

## 2024-05-19 RX ADMIN — PANTOPRAZOLE SODIUM 40 MG: 40 INJECTION, POWDER, FOR SOLUTION INTRAVENOUS at 09:04

## 2024-05-19 RX ADMIN — SEVELAMER CARBONATE 2400 MG: 800 TABLET, FILM COATED ORAL at 11:32

## 2024-05-19 RX ADMIN — LEVOTHYROXINE SODIUM 125 MCG: 125 TABLET ORAL at 06:38

## 2024-05-19 RX ADMIN — CEFTAROLINE FOSAMIL 200 MG: 600 POWDER, FOR SOLUTION INTRAVENOUS at 18:01

## 2024-05-19 ASSESSMENT — COGNITIVE AND FUNCTIONAL STATUS - GENERAL
MOVING TO AND FROM BED TO CHAIR: A LOT
DAILY ACTIVITIY SCORE: 14
MOBILITY SCORE: 12
DRESSING REGULAR UPPER BODY CLOTHING: A LOT
MOVING FROM LYING ON BACK TO SITTING ON SIDE OF FLAT BED WITH BEDRAILS: A LITTLE
DRESSING REGULAR LOWER BODY CLOTHING: A LOT
DAILY ACTIVITIY SCORE: 14
MOBILITY SCORE: 12
DRESSING REGULAR LOWER BODY CLOTHING: A LOT
TURNING FROM BACK TO SIDE WHILE IN FLAT BAD: A LITTLE
MOVING TO AND FROM BED TO CHAIR: A LOT
STANDING UP FROM CHAIR USING ARMS: A LOT
CLIMB 3 TO 5 STEPS WITH RAILING: TOTAL
HELP NEEDED FOR BATHING: A LOT
MOVING FROM LYING ON BACK TO SITTING ON SIDE OF FLAT BED WITH BEDRAILS: A LITTLE
PERSONAL GROOMING: A LOT
DRESSING REGULAR UPPER BODY CLOTHING: A LOT
TOILETING: A LOT
STANDING UP FROM CHAIR USING ARMS: A LOT
WALKING IN HOSPITAL ROOM: TOTAL
TOILETING: A LOT
HELP NEEDED FOR BATHING: A LOT
CLIMB 3 TO 5 STEPS WITH RAILING: TOTAL
WALKING IN HOSPITAL ROOM: TOTAL
TURNING FROM BACK TO SIDE WHILE IN FLAT BAD: A LITTLE
PERSONAL GROOMING: A LOT

## 2024-05-19 ASSESSMENT — PAIN SCALES - GENERAL
PAINLEVEL_OUTOF10: 0 - NO PAIN
PAINLEVEL_OUTOF10: 0 - NO PAIN

## 2024-05-19 ASSESSMENT — PAIN - FUNCTIONAL ASSESSMENT: PAIN_FUNCTIONAL_ASSESSMENT: 0-10

## 2024-05-19 NOTE — CARE PLAN
The patient's goals for the shift include      The clinical goals for the shift include patient will remain hds this shift    Over the shift, the patient did make progress toward the following goals.     Problem: Safety  Goal: Patient will be injury free during hospitalization  Outcome: Progressing     Problem: Pain  Goal: My pain/discomfort is manageable  Outcome: Progressing

## 2024-05-19 NOTE — TREATMENT PLAN
Blood culture from 5/17  (both from peripheral and hs dialysis catheter ), is growing GPC in clusters again despite receiving salvage antibiotic therapy with daptomycin and ceftaroline!!    All of his ID workup for infectious source is negative.  Will talk to nephrology team about removing his dialysis catheter.      Sheldon Saleh MD

## 2024-05-19 NOTE — CARE PLAN
The patient's goals for the shift include      The clinical goals for the shift include patient will remain hds this shift      Problem: Pain  Goal: My pain/discomfort is manageable  Outcome: Progressing     Problem: Safety  Goal: Patient will be injury free during hospitalization  Outcome: Progressing  Goal: I will remain free of falls  Outcome: Progressing

## 2024-05-19 NOTE — PROGRESS NOTES
Edwar Hemphill is a 39 y.o. male on day 19 of admission presenting with SIRS (systemic inflammatory response syndrome) (Multi).    Subjective   Mr. Hemphill is a 39-year-old man with a history of ESRD on hemodialysis.  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  He has had many bloodstream infections related to catheters, again during this admission.  He has MRSA, currently has a temporary femoral hemodialysis catheter, we are having issues eradicating the bacteremia.  TTE without endocarditis, then had a POLI as well on May 14.  CT was without an epidural abscess, MRI was done that was also negative for infection.    I am afraid the blood cultures are still positive.  But no new complaints or events.  No clinical sepsis.       Objective       Physical Exam  Constitutional:       Appearance: Normal appearance.   HENT:      Mouth/Throat:      Mouth: Mucous membranes are moist.   Eyes:      Extraocular Movements: Extraocular movements intact.      Pupils: Pupils are equal, round, and reactive to light.   Cardiovascular:      Rate and Rhythm: Regular rhythm.      Heart sounds: S1 normal and S2 normal.   Pulmonary:      Breath sounds: Poor effort, no crackles  Abdominal:      Comments: Soft, NT/ND, no masses, normal bowel sounds   Genitourinary:     Comments: No dodd  Musculoskeletal:      Right lower leg: No edema.      Left lower leg: No edema.   Right upper extremity amputation  Skin:     General: Skin is warm and dry.   Neurological:      General: No focal deficit present.      Mental Status: She is alert and oriented to person, place, and time.   Psychiatric:         Behavior: Behavior normal.    Right femoral dialysis line noted, temporary    Meds    Current Facility-Administered Medications:     [Held by provider] apixaban (Eliquis) tablet 5 mg, 5 mg, oral, BID, AMARILYS Wilson-CNP, DNP, 5 mg at 05/03/24 0594    B complex-vitamin C-folic acid (Nephrocaps) capsule 1 capsule, 1  capsule, oral, Daily, OSBALDO Wilson, DNP, 1 capsule at 05/19/24 0904    ceftaroline (Teflaro) 200 mg in dextrose 5 % in water (D5W) 50 mL IV, 200 mg, intravenous, q8h, Sheldon Saleh MD, Stopped at 05/19/24 0517    cyclobenzaprine (Flexeril) tablet 5 mg, 5 mg, oral, TID PRN, El Estrada MD, 5 mg at 05/16/24 2035    DAPTOmycin (Cubicin) 700 mg/100 mL  mg, 700 mg, intravenous, q48h, Sheldon Saleh MD, Stopped at 05/18/24 1755    dextrose 50 % injection 12.5 g, 12.5 g, intravenous, q15 min PRN, Veronica De La Paz MD    dextrose 50 % injection 25 g, 25 g, intravenous, q15 min PRN, Mike Santana MD    epoetin tyson-epbx (Retacrit) injection 8,000 Units, 100 Units/kg, subcutaneous, Every Mon/Wed/Fri, Eber Bruce DO, 8,000 Units at 05/15/24 1820    fentaNYL PF (Sublimaze) injection, , , PRN, Ronaldo Dempsey MD, 25 mcg at 05/13/24 1513    glucagon (Glucagen) injection 1 mg, 1 mg, intramuscular, q15 min PRN, Veronica De La Paz MD    heparin 1,000 unit/mL injection 3,100 Units, 3,100 Units, intra-catheter, After Dialysis, OSBALDO Wilson DNP, 3,100 Units at 05/17/24 1500    heparin 1,000 unit/mL injection 3,100 Units, 3,100 Units, intra-catheter, After Dialysis, OSBALDO Wilson DNP, 3,100 Units at 05/17/24 1500    heparin 1,000 unit/mL injection, , , PRN, Ronaldo Dempsey MD, 1,800 Units at 05/13/24 2329    insulin glargine (Lantus) injection 2 Units, 2 Units, subcutaneous, Nightly, Mike Santana MD, 2 Units at 05/18/24 2133    insulin lispro (HumaLOG) injection 0-5 Units, 0-5 Units, subcutaneous, TID, Veronica De La Paz MD, 1 Units at 05/16/24 0956    levothyroxine (Synthroid, Levoxyl) tablet 125 mcg, 125 mcg, oral, Daily before breakfast, Andi Bazan, APRN-CNP, DNP, 125 mcg at 05/19/24 0638    lidocaine (Xylocaine) 20 mg/mL (2 %) injection, , , PRN, Ronaldo Dempsey MD, 5 mL at 05/13/24 1513    lidocaine PF (Xylocaine) 10 mg/mL (1 %) injection, , ,  PRN, Ronaldo Dempsey MD, 5 mL at 05/03/24 1816    loperamide (Imodium) capsule 2 mg, 2 mg, oral, q4h PRN, OSBALDO Wilson DNP    midazolam (Versed) injection, , , PRN, Ronaldo Dempsey MD, 0.5 mg at 05/13/24 1513    nitroglycerin (Nitrostat) SL tablet 0.4 mg, 0.4 mg, sublingual, q5 min PRN, OSBALDO Wilson, LUCILA    ondansetron (Zofran) injection 4 mg, 4 mg, intravenous, q6h PRN, OSBALDO Wilson, LUCILA    oxyCODONE (Roxicodone) immediate release tablet 5 mg, 5 mg, oral, q4h PRN, OSBALDO Wilson, DNP, 5 mg at 05/18/24 1207    oxygen (O2) therapy, , , Continuous PRN, Ronaldo Dempsey MD, Last Rate: 120,000 mL/hr at 05/03/24 1758, 2 L/min at 05/03/24 1758    oxygen (O2) therapy, , inhalation, Continuous PRN - O2/gases, OSBALDO Rousseau    oxygen (O2) therapy, , inhalation, Continuous PRN - O2/gases, OSBALDO Rousseau    pantoprazole (ProtoNix) injection 40 mg, 40 mg, intravenous, BID, OSBALDO Marin, 40 mg at 05/19/24 0904    polyethylene glycol (Glycolax, Miralax) packet 17 g, 17 g, oral, Daily, OSBALDO Wilson, DNP, 17 g at 05/04/24 0624    sennosides (Senokot) tablet 8.6 mg, 8.6 mg, oral, BID, OSBALDO Wilson, DNP, 8.6 mg at 05/18/24 2133    sevelamer carbonate (Renvela) tablet 2,400 mg, 2,400 mg, oral, TID, OSBALDO Wilson, LUCILA, 2,400 mg at 05/19/24 0904    simethicone (Mylicon) chewable tablet 80 mg, 80 mg, oral, q8h PRN, OSBALDO Wilson, LUCILA    sodium zirconium cyclosilicate (Lokelma) packet 10 g, 10 g, oral, Daily, Eber Bruce DO   Medications Discontinued During This Encounter   Medication Reason    loperamide (Imodium A-D) 2 mg tablet Med List Cleanup    piperacillin-tazobactam-dextrose (Zosyn) IV 2.25 g     piperacillin-tazobactam-dextrose (Zosyn) IV 2.25 g     heparin 1,000 unit/mL injection 3,100 Units     heparin 1,000 unit/mL injection 3,100 Units      "glucagon (Glucagen) injection 1 mg     dextrose 50 % injection 25 g     glucagon (Glucagen) injection 1 mg     dextrose 50 % injection 12.5 g     insulin lispro (HumaLOG) injection 0-5 Units     melatonin tablet 3 mg     insulin regular 100 unit/100 mL (1 unit/mL) in 0.9 % NaCl infusion     insulin regular (HumuLIN, NovoLIN) bolus from bag 2-6 Units     dextrose 5 % and lactated Ringer's infusion     vancomycin (Vancocin) pharmacy to dose - pharmacy monitoring Duplicate order    epoetin tyson-epbx (Retacrit) injection 8,000 Units Availability    insulin glargine (Lantus) injection 10 Units     epoetin tyson-epbx (Retacrit) injection 8,000 Units     norepinephrine (Levophed) 8 mg in dextrose 5% 250 mL (0.032 mg/mL) infusion (premix)     insulin glargine (Lantus) injection 5 Units     oxygen (O2) therapy     insulin glargine (Lantus) injection 4 Units     atorvastatin (Lipitor) tablet 40 mg     DAPTOmycin (Cubicin) 450 mg in sodium chloride 0.9% 50 mL IV     vancomycin (Vancocin) pharmacy to dose - pharmacy monitoring     sodium chloride 0.9% infusion         Allergies  Allergies   Allergen Reactions    Cashew Nut Anaphylaxis and Swelling     cashews        Last Recorded Vitals  Blood pressure 142/81, pulse 102, temperature 37 °C (98.6 °F), temperature source Oral, resp. rate 16, height 1.753 m (5' 9.02\"), weight 67.7 kg (149 lb 4.8 oz), SpO2 98%.  Intake/Output last 3 Shifts:  No intake/output data recorded.    Last 24 hour Results  Results for orders placed or performed during the hospital encounter of 04/30/24 (from the past 24 hour(s))   POCT GLUCOSE   Result Value Ref Range    POCT Glucose 148 (H) 74 - 99 mg/dL   POCT GLUCOSE   Result Value Ref Range    POCT Glucose 160 (H) 74 - 99 mg/dL   Renal Function Panel   Result Value Ref Range    Glucose 124 (H) 74 - 99 mg/dL    Sodium 132 (L) 136 - 145 mmol/L    Potassium 3.9 3.5 - 5.3 mmol/L    Chloride 98 98 - 107 mmol/L    Bicarbonate 23 21 - 32 mmol/L    Anion Gap 15 " 10 - 20 mmol/L    Urea Nitrogen 15 6 - 23 mg/dL    Creatinine 4.76 (H) 0.50 - 1.30 mg/dL    eGFR 15 (L) >60 mL/min/1.73m*2    Calcium 8.7 8.6 - 10.3 mg/dL    Phosphorus 4.6 2.5 - 4.9 mg/dL    Albumin 2.3 (L) 3.4 - 5.0 g/dL   POCT GLUCOSE   Result Value Ref Range    POCT Glucose 106 (H) 74 - 99 mg/dL        Imaging results  Electrocardiogram, 12-lead PRN ACS symptoms    Result Date: 5/17/2024  Atrial fibrillation with rapid ventricular response with premature ventricular or aberrantly conducted complexes Nonspecific ST and T wave abnormality , probably digitalis effect Abnormal ECG When compared with ECG of 16-JAN-2024 07:06, Atrial fibrillation has replaced Sinus rhythm ST elevation now present in Inferior leads Nonspecific T wave abnormality, worse in Lateral leads    IR CVC removal    Result Date: 5/16/2024  These images are not reportable by radiology and will not be interpreted by  Radiologists.    Transesophageal Echo (POLI)    Result Date: 5/14/2024   [facility], [address] [place] [address]              Tel [phone] and Fax [phone] TRANSESOPHAGEAL ECHOCARDIOGRAM REPORT  Patient Name:      XIOMARA Maya Physician:    [id number] Edward Hernandez DO Study Date:        5/14/2024           Ordering Provider:    [id number] ALKA VIEYRA MRN/PID:           [id number]            Fellow: Accession#:        [id number]        Nurse:                Ronaldo Woo RN Date of Birth/Age: 1985 / 39      Sonographer:          Alejandro Carroll RDCS                    years Gender:            M                   Additional Staff:     Kayden Matthews CRNA Height:            175.26 cm           Admit Date:           4/30/2024 Weight:            72.58 kg            Admission Status:     Inpatient -                                                              Routine BSA  / BMI:         1.88 m2 / 23.63     Encounter#:           [id number]                    kg/m2                                        Department Location:  [facility] Non                                                              Invasive Blood Pressure: 116 /69 mmHg Study Type:    TRANSESOPHAGEAL ECHO (POLI) Diagnosis/ICD: Bacteremia-R78.81 Indication:    Staphyococcus aureus bacteremia, R/o endocarditis CPT Code:      POLI Complete-45002; Doppler Limited-83950; Color Doppler-40850 Patient History: Allergies:         Cashew nuts. Smoker:            Unknown. Diabetes:          Yes Insulin Pertinent History: HTN, Hyperlipidemia and PVD. 39 y.o. male presents for POLI                    for endocarditis rule out.                     PMH of ESRD and SIRS. Study Detail: The following Echo studies were performed: 2D. Agitated saline               used as a contrast agent for intraseptal flow evaluation. Total               contrast used for this procedure was 10 mL via IV push. Patient's               heart rhythm is sinus tachycardia. The patient was sedated.  PHYSICIAN INTERPRETATION: POLI Details: The POLI probe used was 5177. Technically adequate omniplane transesophageal echocardiogram performed. POLI Medication: The pharynx was anesthetized with Cetacaine spray and viscous lidocaine. The patient was sedated by Anesthesia; please refer to anesthesia flow sheet for medications used. POLI Procedure: The probe was passed without difficulty. Left Ventricle: The left ventricular systolic function is mildly decreased, with an estimated ejection fraction of 35-40%. Wall motion is abnormal. The left ventricular cavity size was not assessed. Left ventricular diastolic filling was not assessed. Left Atrium: The left atrium is normal in size. There is no definite left atrial thrombus present. A bubble study using agitated saline was performed. Bubble study is negative. There is a normal sized left atrial appendage. Right Ventricle:  The right ventricle is normal in size. There is normal right ventricular global systolic function. Right Atrium: The right atrium is normal in size. Aortic Valve: There is no visualized aortic valve vegetation. The aortic valve appears structurally normal. There is no evidence of aortic valve regurgitation. Mitral Valve: The mitral valve is normal in structure. There was no mitral valve vegetation visualized. There is trace mitral valve regurgitation. Tricuspid Valve: No vegetation is seen on the tricuspid valve. The tricuspid valve is structurally normal. There is trace tricuspid regurgitation. Pulmonic Valve: No pulmonic valve vegetation visualized. The pulmonic valve is structurally normal. There is no indication of pulmonic valve regurgitation. Pericardium: There is no pericardial effusion noted. Aorta: The aortic root is normal.  CONCLUSIONS:  1. Left ventricular systolic function is mildly decreased with a 35-40% estimated ejection fraction.  2. No mitral valve vegetation visualized.  3. No tricuspid valve vegetation.  4. No aortic valve vegetation visualized.  5. No pulmonic valve vegetation visualized.  6. No left atrial thrombus. QUANTITATIVE DATA SUMMARY:  [id number] Edward David CASEY Electronically signed on 5/14/2024 at 7:06:59 PM  ** Final **     IR CVC tunneled    Result Date: 5/13/2024  Interpreted By:  Ronaldo Dempesy, STUDY: IR CVC TUNNELED; ;  5/13/2024 3:26 pm   ULTRASOUND FLUOROSCOPICALLY GUIDED RIGHT GROIN TEMPORARY HEMODIALYSIS CATHETER PLACEMENT   INDICATION: Signs/Symptoms:Temporary dialysis line today. 39-year-old man with end-stage renal disease, persistent bacteremia despite removal of groin tunneled hemodialysis catheter. Requires replacement of hemodialysis access.   COMPARISON: Fluoroscopic images from prior tunneled hemodialysis catheter exchange 05/13/2024   ACCESSION NUMBER(S): [id number]   ORDERING CLINICIAN: BRADEN QUIROGA   TECHNIQUE:   ATTENDING : Ronaldo Dempsey M.D.    TECHNICAL DESCRIPTION/FINDINGS: The procedure, including all risks, benefits and alternatives were explained to the patient in detail. All questions were answered and written informed consent was obtained.   Patient was positioned supine on the fluoroscopy table. A time-out was performed.   The bilateral groins were prepped and draped in usual sterile fashion. Focused ultrasound was performed over the superior aspect of the right common femoral vein demonstrating superior patency with partial compressibility. Ultrasound images were saved. 1% lidocaine was administered subcutaneously for local anesthesia. Under real-time ultrasound guidance, the right common femoral vein was accessed in antegrade direction using micropuncture technique. Ultrasound images were saved. Under fluoroscopic visualization, an 018 guidewire was advanced into the right common iliac vein and micropuncture transitional introducer was utilized for placement of an 035 guidewire into the IVC. After serial dilation, a 13 Congolese by 20 cm Trialysis catheter was then placed. A completion fluoroscopic image was obtained demonstrating the tip of the temporary hemodialysis catheter projecting over the IV C.   Catheter lumens easily aspirated and flushed. Large-bore dialysis lumens were locked with a concentrated heparinized solution (1000 units/cc). A small bore 3rd lumen was locked with a dilute heparinized solution. The catheter hub was sutured to the skin with 2 0 Prolene stay suture. Sterile dressing was applied.   SEDATION/MEDICATIONS: Continuous cardiopulmonary monitoring was performed by a radiology nurse for the duration of the procedure. 0.5 mg Versed and   25 mcg Fentanyl were administered for moderate conscious sedation time of 20 minutes.      10 cc 1% Lidocaine was administered subcutaneously for local anesthesia. SPECIMENS: None. ESTIMATED BLOOD LOSS:  5 cc FLOUROSCOPY:  0.1 minutes; DAP  2405 mGy-cm*2; Air Kerma  6.58 mGy CONTRAST: None.    FINDINGS: Test       Ultrasound fluoroscopically guided right groin temporary hemodialysis catheter. The catheter is ready for immediate use.     MACRO: None   Signed by: Ronaldo Dempsey 5/13/2024 4:59 PM Dictation workstation:   [id number]    MR lumbar spine wo IV contrast    Result Date: 5/4/2024  Interpreted By:  Eva Og and Muddasani Dheeraj STUDY: MR LUMBAR SPINE WO IV CONTRAST   INDICATION: Signs/Symptoms:back pain new with bacteremia, possible infection   COMPARISON: CT of the lumbar spine 04/30/2024. Body CT 01/14/2024.   ACCESSION NUMBER(S): [id number]   ORDERING CLINICIAN: ALKA VIEYRA   TECHNIQUE: Sagittal T1, sagittal T2, sagittal STIR, axial T1 and axial T2 weighted MRI images of the lumbar spine were obtained without intravenous contrast administration.   FINDINGS: Image quality is somewhat degraded due to motion and technique.   There are 5 non-rib-bearing lumbar-type vertebral bodies. The last well-formed intervertebral disc is labeled L5-S1.   Minimal retrolisthesis at L5-S1. Alignment is otherwise normal.   Vertebral body heights are maintained. Disc spaces are relatively maintained.   There is diffusely hypointense bone marrow signal on the T1 weighted images which may be related to renal osteodystrophy. Differential considerations include anemia, reactive marrow or bone marrow infiltrating process.   There is no increased STIR signal to suggest marrow edema. Multilevel degenerative endplate changes include prominent Schmorl's nodes most pronounced at L3-4 where there are Modic type 2 changes and central intradiscal T1 and T2 hypointense signal likely degenerative.   The conus medullaris terminates at L1-2. The visualized spinal cord, conus medullaris and cauda equina demonstrate normal signal and morphology.   Fatty atrophic changes involve the paravertebral musculature. Partially visualized kidneys demonstrate atrophic changes with multiple bilateral cysts, better characterized  on previous body CT dated 01/14/2024. Partially visualized catheter within the inferior vena cava and right common iliac vein.   T10-11 on sagittal images only: No spinal canal or neural foraminal stenosis.   T11-12 on sagittal images only: No spinal canal or neural foraminal stenosis.   T12-L1: Facet arthropathy and ligamentum flavum thickening. No spinal canal or neural foraminal stenosis.   L1-2: Facet arthropathy and ligamentum flavum thickening. No spinal canal or neural foraminal stenosis.   L2-3: Facet arthropathy and ligamentum flavum thickening. No spinal canal or neural foraminal stenosis.   L3-4: Disc bulge, facet arthropathy and ligamentum flavum thickening. No spinal canal or neural foraminal stenosis.   L4-5: Disc bulge with small central protrusion, facet arthropathy and ligamentum flavum thickening. No spinal canal stenosis. Mild bilateral neural foraminal stenosis.   L5-S1: Disc bulge, facet arthropathy and ligamentum flavum thickening. No spinal canal stenosis. Mild right and no left neural foraminal stenosis.   Degenerative changes are noted at the sacroiliac joints.       Diffusely hypointense bone marrow signal on the T1 weighted images may be related to renal osteodystrophy. Differential considerations include anemia, reactive bone marrow or bone marrow infiltrating process.   No definite imaging findings to suggest discitis osteomyelitis on the current exam.   Degenerative changes without significant spinal canal stenosis. Varying degrees of mild neural foraminal narrowing as detailed above.   I personally reviewed the images/study and I agree with the findings as stated by Dr. Lowe.   MACRO: None   Signed by: Eva Og 5/4/2024 11:11 AM Dictation workstation:   [id number]    IR CVC exchange    Result Date: 5/3/2024  Interpreted By:  Ronaldo Dempsey, STUDY: IR CVC EXCHANGE; ;  5/3/2024 6:28 pm   FLUOROSCOPICALLY GUIDED EXCHANGE OF RIGHT GROIN TUNNELED HEMODIALYSIS CATHETER    INDICATION: Signs/Symptoms:Bacteremia- Right fem TDC exchange. 39-year-old man with end-stage renal disease, central thoracic venous occlusion, end-stage vascular access with bacteremia. Guidewire exchange of right groin tunneled hemodialysis catheter in an attempt to salvage the access.   COMPARISON: Chest radiograph 04/30/2024, fluoroscopic images from prior tunneled hemodialysis catheter placement 01/22/2024   ACCESSION NUMBER(S): [id number]   ORDERING CLINICIAN: SACHI MORTENSEN   TECHNIQUE:   ATTENDING : Ronaldo Dempsey M.D.   TECHNICAL DESCRIPTION/FINDINGS: The procedure, including all risks, benefits and alternatives were explained to the patient in detail. All questions were answered and written informed consent was obtained.   The patient was positioned supine on the angiography table. A time-out was performed.   The right groin was prepped and draped in usual sterile fashion. A  fluoroscopic image was obtained demonstrating the tunneled hemodialysis catheter terminating at the inferior cavoatrial junction.   1% lidocaine was administered via the subcutaneous tunnel tract for local anesthesia. Blunt dissection was performed to free the subcutaneous catheter cuff. An 035 stiff Glidewire was then advanced through the catheter into the right atrium. The old catheter was removed. Over the guidewire, a new 14.5 Pashto by 42 cm tip to cuff dual-lumen hemodialysis catheter was then placed. A completion fluoroscopic image demonstrates the catheter in the inferior right atrium.   Catheter lumens easily aspirated and flushed and were locked with a concentrated heparinized solution (1000 units/cc). The catheter hub was then sutured to the skin with 2 0 Prolene stay suture. Because of losing along the tract, Gel-Foam pledgets were then administered subcutaneously near where the catheter enters the skin for additional hemostasis, followed by a pursestring suture. A sterile dressing was applied.    SEDATION/MEDICATIONS: Continuous cardiopulmonary monitoring was performed by a radiology nurse for the duration of the procedure.  1 mg Versed and   50 mcg Fentanyl were administered for moderate conscious sedation time of 20 minutes.      10 cc 1% Lidocaine was administered subcutaneously for local anesthesia. SPECIMENS: None. ESTIMATED BLOOD LOSS:  5 cc FLOUROSCOPY:  1.2 minutes; DAP  06917 mGy-cm*2; Air Kerma  59.07 mGy CONTRAST: None.   FINDINGS: Test       Fluoroscopically guidewire exchange of right groin tunneled hemodialysis catheter. The newly exchanged catheter is ready for immediate use.     MACRO: None   Signed by: Ronaldo eDmpsey 5/3/2024 8:09 PM Dictation workstation:   [id number]    Transthoracic Echo Complete    Result Date: 5/3/2024   [facility] [place]note 2[place] [address] [place] [address]              Tel [phone] and Fax [phone] TRANSTHORACIC ECHOCARDIOGRAM REPORT  Patient Name:      XIOMARA Maya Physician:    [id number] Brandon Nur DO Study Date:        5/3/2024             Ordering Provider:    [id number] VIEYRA MRN/PID:           [id number]             Fellow: Accession#:        [id number]         Nurse: Date of Birth/Age: 1985 / 39 years Sonographer:          Sy Lyons NAKITA Gender:            M                    Additional Staff: Height:            175.00 cm            Admit Date: Weight:            65.80 kg             Admission Status:     Inpatient -                                                               Routine BSA / BMI:         1.80 m2 / 21.49      Encounter#:           [id number]                    kg/m2                                         Department Location:  [facility] Non                                                               Invasive Blood Pressure: 112 /74 mmHg Study Type:    TRANSTHORACIC ECHO (TTE) COMPLETE Diagnosis/ICD: Endocarditis, valve  unspecified-I38 Indication:    endocarditis CPT Code:      Echo Complete w Full Doppler-00269 Patient History: Pertinent History: HTN and Hyperlipidemia. Study Detail: The following Echo studies were performed: M-Mode, Doppler, color               flow and 2D.  PHYSICIAN INTERPRETATION: Left Ventricle: The left ventricular systolic function is moderately decreased, with an estimated ejection fraction of 35-40%. There are no regional wall motion abnormalities. The left ventricular cavity size is normal. Abnormal (paradoxical) septal motion, consistent with left bundle branch block. Spectral Doppler shows an impaired relaxation pattern of left ventricular diastolic filling. Left Atrium: The left atrium is normal in size. Right Ventricle: The right ventricle is normal in size. There is normal right ventricular global systolic function. Right Atrium: The right atrium is normal in size. Aortic Valve: The aortic valve is probably trileaflet. There is no evidence of aortic valve regurgitation. Mitral Valve: The mitral valve is mildly thickened. There is trace mitral valve regurgitation. Tricuspid Valve: The tricuspid valve is structurally normal. No evidence of tricuspid regurgitation. Pulmonic Valve: The pulmonic valve is structurally normal. There is no indication of pulmonic valve regurgitation. Pericardium: There is no pericardial effusion noted. Aorta: The aortic root is normal. In comparison to the previous echocardiogram(s): Compared with study from 1/16/2024, LV function has declined. Was previously normal.  CONCLUSIONS:  1. Left ventricular systolic function is moderately decreased with a 35-40% estimated ejection fraction.  2. Abnormal septal motion consistent with left bundle branch block.  3. Spectral Doppler shows an impaired relaxation pattern of left ventricular diastolic filling.  4. No vegetations visualized within the limitations of this study.  5. Compared with study from 1/16/2024, LV function has  declined. Was previously normal. QUANTITATIVE DATA SUMMARY: LA VOLUME:                               Normal Ranges: LA Vol A4C:        40.1 ml    (22+/-6mL/m2) LA Vol A2C:        35.3 ml LA Vol BP:         40.9 ml LA Vol Index A4C:  22.3ml/m2 LA Vol Index A2C:  19.6 ml/m2 LA Vol Index BP:   22.7 ml/m2 LA Area A4C:       15.3 cm2 LA Area A2C:       13.2 cm2 LA Major Axis A4C: 5.0 cm LA Major Axis A2C: 4.2 cm LA Volume Index:   22.7 ml/m2  [id number] Brandon Nur  Electronically signed on 5/3/2024 at 4:24:04 PM  ** Final **     Lower extremity venous duplex right    Result Date: 5/1/2024             [place] [id number]   Tel [phone] and Fax [phone]  Vascular Lab Report VASC US LOWER EXTREMITY VENOUS DUPLEX RIGHT  Patient Name:      XIOMARA FERGUSON         Reading Physician:  [id number] Rayo Jauregui MD, RPVI Study Date:        4/30/2024            Ordering Physician: [id number] DEEP PANIAGUA MRN/PID:           [id number]             Technologist:       Megan Shay RVT Accession#:        [id number]         Technologist 2: Date of Birth/Age: 1985 / 39 years Encounter#:         [id number] Gender:            M Admission Status:  Emergency            Location Performed: [facility]  Diagnosis/ICD: Pain in right leg-M79.604 CPT Codes:     71206 Peripheral venous duplex scan for DVT Limited  **CRITICAL RESULT** Critical Result: Acute DVT in the right leg. Notification called to Deep Paniagua PA-C on 4/30/2024 at 10:52:21 AM by Megan Shay RDMS, RVT.  CONCLUSIONS: Right Lower Venous: There is acute non-occlusive deep vein thrombosis visualized in the common femoral vein. There are chronic changes visualized in the popliteal vein. Enlarged lymph node noted in the right groin. Waveforms suggestive of more distal obstruction/thrombus. May wish further means of imaging evaluation. Left Lower Venous: There are chronic  changes visualized in the common femoral vein.  Comparison: Compared with study from 7/27/2023, New finding of acute non-occlusive thrombus in the right CFV.  Imaging & Doppler Findings:  Right                 Compressible      Thrombus          Flow Distal External Iliac                     None CFV                     Partial    Acute non-occlusive Continuous PFV                       Yes             None FV Proximal               Yes             None         Continuous FV Mid                    Yes             None FV Distal                 Yes             None Popliteal                 Yes            Chronic       Continuous Peroneal                  Yes             None PTV                       Yes             None  Left Compress Thrombus        Flow CFV    Yes    Chronic  Spontaneous/Phasic  [id number] Rayo Jauregui MD, RPVI Electronically signed by [id number] MARIANO Call MD on 5/1/2024 at 9:13:07 AM  ** Final **     XR chest 1 view    Result Date: 4/30/2024  STUDY: Chest Radiograph;  4/30/2024 at 2:42 PM. INDICATION: Fever. COMPARISON: XR chest 1/14/2024, 11/10/2023; CTA chest 2/15/2023. ACCESSION NUMBER(S): [id number] ORDERING CLINICIAN: WENDY INFANTE TECHNIQUE:  Frontal chest was obtained at 14:42 hours. FINDINGS: CARDIOMEDIASTINAL SILHOUETTE: Cardiomediastinal silhouette is normal in size and configuration.  LUNGS: Patchy right basilar infiltrate or atelectasis with small right pleural effusion. Left basilar scar versus atelectasis. ABDOMEN: No remarkable upper abdominal findings.  BONES: No acute osseous changes.    Patchy right basilar infiltrate or atelectasis with small right pleural effusion. Signed by Joseph Overton MD    CT thoracic spine wo IV contrast    Result Date: 4/30/2024  STUDY: CT Thoracic Spine and Lumbar Spine without IV Contrast; 4/30/2024 11:11 AM INDICATION: Midline back pain. COMPARISON: None Available. ACCESSION NUMBER(S): [id number], [id number] ORDERING CLINICIAN: RAJNI FARMER  TECHNIQUE:  CT of the thoracic spine and lumbar spine was performed without intravenous or intrathecal contrast.  Sagittal and coronal reconstructions were generated.  Automated mA/kV exposure control was utilized and patient examination was performed in strict accordance with principles of ALARA. FINDINGS: Portions of the T1 vertebral body, as well as a small portion of the anterior superior aspect of T2 is cut off on this study.  No compression deformities are visualized within the thoracic vertebral bodies from T2 through T12.  No spondylolisthesis is noted.  No pedicular defects are noted.  No prominent edema is appreciated within the adjacent posterior paravertebral musculature.  Evaluation of the thecal sac is limited due to lack of myelographic contrast.  There are enlarged lymph nodes seen within the visualized portions of the mediastinum.  There are bilateral lower lobe infiltrates with tiny bilateral effusions.  Coronary artery calcifications are present. No compression deformities are visualized within the lumbar spine.  No pars defects are noted.  No significant spondylolisthesis is seen.  No prominent edema is appreciated within the visualized portions of the psoas musculature.  A long dialysis catheter is visualized within the inferior vena cava.  Evaluation of the thecal sac is limited due to lack of myelographic contrast.  There is disc bulging seen from L3 through S1.  No significant impingement is appreciated upon the neural foramina on the sagittal reconstructions.    Thoracic spine: 1.  Portions of T1 and T2 cough on this study. 2.  No acute osseous findings seen from T3 through T12. 3.  Tiny bilateral effusions with adjacent lower lobe infiltrates. 4.  The distal adenopathy. 5.  Coronary artery calcifications. Lumbar spine: 1.  No compression deformities or spinal listhesis is noted. 2.  Disc bulging from L3 to S1.  Evaluation for spinal stenosis is limited due to lack mammographic contrast. 3.   Incidental note of a long dialysis catheter within the inferior vena cava. Signed by Deejay Hoffman MD    CT lumbar spine wo IV contrast    Result Date: 4/30/2024  STUDY: CT Thoracic Spine and Lumbar Spine without IV Contrast; 4/30/2024 11:11 AM INDICATION: Midline back pain. COMPARISON: None Available. ACCESSION NUMBER(S): [id number], [id number] ORDERING CLINICIAN: RAJNI FARMER TECHNIQUE:  CT of the thoracic spine and lumbar spine was performed without intravenous or intrathecal contrast.  Sagittal and coronal reconstructions were generated.  Automated mA/kV exposure control was utilized and patient examination was performed in strict accordance with principles of ALARA. FINDINGS: Portions of the T1 vertebral body, as well as a small portion of the anterior superior aspect of T2 is cut off on this study.  No compression deformities are visualized within the thoracic vertebral bodies from T2 through T12.  No spondylolisthesis is noted.  No pedicular defects are noted.  No prominent edema is appreciated within the adjacent posterior paravertebral musculature.  Evaluation of the thecal sac is limited due to lack of myelographic contrast.  There are enlarged lymph nodes seen within the visualized portions of the mediastinum.  There are bilateral lower lobe infiltrates with tiny bilateral effusions.  Coronary artery calcifications are present. No compression deformities are visualized within the lumbar spine.  No pars defects are noted.  No significant spondylolisthesis is seen.  No prominent edema is appreciated within the visualized portions of the psoas musculature.  A long dialysis catheter is visualized within the inferior vena cava.  Evaluation of the thecal sac is limited due to lack of myelographic contrast.  There is disc bulging seen from L3 through S1.  No significant impingement is appreciated upon the neural foramina on the sagittal reconstructions.    Thoracic spine: 1.  Portions of T1 and T2 cough on  this study. 2.  No acute osseous findings seen from T3 through T12. 3.  Tiny bilateral effusions with adjacent lower lobe infiltrates. 4.  The distal adenopathy. 5.  Coronary artery calcifications. Lumbar spine: 1.  No compression deformities or spinal listhesis is noted. 2.  Disc bulging from L3 to S1.  Evaluation for spinal stenosis is limited due to lack mammographic contrast. 3.  Incidental note of a long dialysis catheter within the inferior vena cava. Signed by Deejay Hoffman MD    XR pelvis 1-2 views    Result Date: 4/30/2024  STUDY: Pelvis Radiographs; 4/30/2024 at 9:06 AM. INDICATION: Pain. COMPARISON: CT pelvis 7/17/2023. ACCESSION NUMBER(S): [id number] ORDERING CLINICIAN: RAJNI FARMER TECHNIQUE:  One view(s) of the pelvis. FINDINGS:  The pelvic ring is intact.  There is no acute fracture.  Severe underlying osteopenia.    Slightly limited secondary to underlying osteopenia.  No acute osseous abnormality. Signed by Roe Ruiz MD       Assessment and Plan  Mr. Hemphill is a 39-year-old man with a history of ESRD on hemodialysis.  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  He has had many bloodstream infections related to catheters, again during this admission.  He has MRSA, currently has a temporary femoral hemodialysis catheter, we are having issues eradicating the bacteremia.  TTE without endocarditis, then had a POLI as well on May 14.  CT was without an epidural abscess, MRI was done that was also negative for infection.    He was dialyzed on Friday without event.  I am afraid that the blood cultures are still positive for staph aureus.  I spoke with Dr. Saleh.  I will dialyze him early tomorrow, I will ask interventional radiology to take out the temporary dialysis line.  He will likely have a white blood cell scan.         I spent a total of 50 minutes with this patient today.    Zeeshan Gonzalez MD

## 2024-05-20 ENCOUNTER — APPOINTMENT (OUTPATIENT)
Dept: DIALYSIS | Facility: HOSPITAL | Age: 39
End: 2024-05-20
Payer: COMMERCIAL

## 2024-05-20 ENCOUNTER — APPOINTMENT (OUTPATIENT)
Dept: CARDIOLOGY | Facility: HOSPITAL | Age: 39
DRG: 314 | End: 2024-05-20
Payer: COMMERCIAL

## 2024-05-20 LAB
ALBUMIN SERPL BCP-MCNC: 2.3 G/DL (ref 3.4–5)
ANION GAP SERPL CALC-SCNC: 11 MMOL/L (ref 10–20)
ATRIAL RATE: 178 BPM
BUN SERPL-MCNC: 10 MG/DL (ref 6–23)
CALCIUM SERPL-MCNC: 7.8 MG/DL (ref 8.6–10.3)
CHLORIDE SERPL-SCNC: 98 MMOL/L (ref 98–107)
CO2 SERPL-SCNC: 29 MMOL/L (ref 21–32)
CREAT SERPL-MCNC: 3.26 MG/DL (ref 0.5–1.3)
EGFRCR SERPLBLD CKD-EPI 2021: 24 ML/MIN/1.73M*2
ERYTHROCYTE [DISTWIDTH] IN BLOOD BY AUTOMATED COUNT: 17.5 % (ref 11.5–14.5)
GLUCOSE BLD MANUAL STRIP-MCNC: 113 MG/DL (ref 74–99)
GLUCOSE BLD MANUAL STRIP-MCNC: 116 MG/DL (ref 74–99)
GLUCOSE BLD MANUAL STRIP-MCNC: 68 MG/DL (ref 74–99)
GLUCOSE BLD MANUAL STRIP-MCNC: 86 MG/DL (ref 74–99)
GLUCOSE BLD MANUAL STRIP-MCNC: 94 MG/DL (ref 74–99)
GLUCOSE SERPL-MCNC: 68 MG/DL (ref 74–99)
HCT VFR BLD AUTO: 25.3 % (ref 41–52)
HGB BLD-MCNC: 7.7 G/DL (ref 13.5–17.5)
MCH RBC QN AUTO: 27.1 PG (ref 26–34)
MCHC RBC AUTO-ENTMCNC: 30.4 G/DL (ref 32–36)
MCV RBC AUTO: 89 FL (ref 80–100)
NRBC BLD-RTO: 0 /100 WBCS (ref 0–0)
PHOSPHATE SERPL-MCNC: 2.9 MG/DL (ref 2.5–4.9)
PLATELET # BLD AUTO: 149 X10*3/UL (ref 150–450)
POTASSIUM SERPL-SCNC: 3.5 MMOL/L (ref 3.5–5.3)
Q ONSET: 221 MS
QRS COUNT: 24 BEATS
QRS DURATION: 76 MS
QT INTERVAL: 272 MS
QTC CALCULATION(BAZETT): 419 MS
QTC FREDERICIA: 363 MS
R AXIS: 27 DEGREES
RBC # BLD AUTO: 2.84 X10*6/UL (ref 4.5–5.9)
SODIUM SERPL-SCNC: 134 MMOL/L (ref 136–145)
T AXIS: 61 DEGREES
T OFFSET: 357 MS
VENTRICULAR RATE: 143 BPM
WBC # BLD AUTO: 5 X10*3/UL (ref 4.4–11.3)

## 2024-05-20 PROCEDURE — 2500000002 HC RX 250 W HCPCS SELF ADMINISTERED DRUGS (ALT 637 FOR MEDICARE OP, ALT 636 FOR OP/ED): Performed by: INTERNAL MEDICINE

## 2024-05-20 PROCEDURE — 82947 ASSAY GLUCOSE BLOOD QUANT: CPT

## 2024-05-20 PROCEDURE — C9113 INJ PANTOPRAZOLE SODIUM, VIA: HCPCS

## 2024-05-20 PROCEDURE — 36589 REMOVAL TUNNELED CV CATH: CPT

## 2024-05-20 PROCEDURE — 6350000001 HC RX 635 EPOETIN >10,000 UNITS: Mod: JW | Performed by: INTERNAL MEDICINE

## 2024-05-20 PROCEDURE — 2500000004 HC RX 250 GENERAL PHARMACY W/ HCPCS (ALT 636 FOR OP/ED): Performed by: INTERNAL MEDICINE

## 2024-05-20 PROCEDURE — 85027 COMPLETE CBC AUTOMATED: CPT | Performed by: INTERNAL MEDICINE

## 2024-05-20 PROCEDURE — 2500000006 HC RX 250 W HCPCS SELF ADMINISTERED DRUGS (ALT 637 FOR ALL PAYERS): Performed by: NURSE PRACTITIONER

## 2024-05-20 PROCEDURE — 2500000004 HC RX 250 GENERAL PHARMACY W/ HCPCS (ALT 636 FOR OP/ED)

## 2024-05-20 PROCEDURE — 2500000001 HC RX 250 WO HCPCS SELF ADMINISTERED DRUGS (ALT 637 FOR MEDICARE OP): Performed by: FAMILY MEDICINE

## 2024-05-20 PROCEDURE — 80069 RENAL FUNCTION PANEL: CPT | Performed by: INTERNAL MEDICINE

## 2024-05-20 PROCEDURE — 2500000001 HC RX 250 WO HCPCS SELF ADMINISTERED DRUGS (ALT 637 FOR MEDICARE OP): Performed by: NURSE PRACTITIONER

## 2024-05-20 PROCEDURE — 8010000001 HC DIALYSIS - HEMODIALYSIS PER DAY

## 2024-05-20 PROCEDURE — 1200000002 HC GENERAL ROOM WITH TELEMETRY DAILY

## 2024-05-20 PROCEDURE — 2500000005 HC RX 250 GENERAL PHARMACY W/O HCPCS: Performed by: SURGERY

## 2024-05-20 PROCEDURE — 36415 COLL VENOUS BLD VENIPUNCTURE: CPT | Performed by: INTERNAL MEDICINE

## 2024-05-20 PROCEDURE — 2500000004 HC RX 250 GENERAL PHARMACY W/ HCPCS (ALT 636 FOR OP/ED): Performed by: NURSE PRACTITIONER

## 2024-05-20 RX ADMIN — CYCLOBENZAPRINE HYDROCHLORIDE 5 MG: 5 TABLET, FILM COATED ORAL at 22:19

## 2024-05-20 RX ADMIN — SENNOSIDES 8.6 MG: 8.6 TABLET, FILM COATED ORAL at 09:42

## 2024-05-20 RX ADMIN — PANTOPRAZOLE SODIUM 40 MG: 40 INJECTION, POWDER, FOR SOLUTION INTRAVENOUS at 22:19

## 2024-05-20 RX ADMIN — DEXTROSE MONOHYDRATE 12.5 G: 25 INJECTION, SOLUTION INTRAVENOUS at 12:19

## 2024-05-20 RX ADMIN — EPOETIN ALFA-EPBX 8000 UNITS: 10000 INJECTION, SOLUTION INTRAVENOUS; SUBCUTANEOUS at 18:43

## 2024-05-20 RX ADMIN — CEFTAROLINE FOSAMIL 200 MG: 600 POWDER, FOR SOLUTION INTRAVENOUS at 03:44

## 2024-05-20 RX ADMIN — PANTOPRAZOLE SODIUM 40 MG: 40 INJECTION, POWDER, FOR SOLUTION INTRAVENOUS at 09:43

## 2024-05-20 RX ADMIN — LEVOTHYROXINE SODIUM 125 MCG: 125 TABLET ORAL at 07:08

## 2024-05-20 RX ADMIN — CEFTAROLINE FOSAMIL 200 MG: 600 POWDER, FOR SOLUTION INTRAVENOUS at 13:00

## 2024-05-20 RX ADMIN — HEPARIN SODIUM 3100 UNITS: 1000 INJECTION INTRAVENOUS; SUBCUTANEOUS at 12:23

## 2024-05-20 RX ADMIN — INSULIN GLARGINE 2 UNITS: 100 INJECTION, SOLUTION SUBCUTANEOUS at 22:19

## 2024-05-20 RX ADMIN — SEVELAMER CARBONATE 2400 MG: 800 TABLET, FILM COATED ORAL at 09:41

## 2024-05-20 RX ADMIN — SEVELAMER CARBONATE 2400 MG: 800 TABLET, FILM COATED ORAL at 17:32

## 2024-05-20 RX ADMIN — OXYCODONE HYDROCHLORIDE 5 MG: 5 TABLET ORAL at 22:19

## 2024-05-20 RX ADMIN — DAPTOMYCIN IN SODIUM CHLORIDE 700 MG: 700 INJECTION, SOLUTION INTRAVENOUS at 18:44

## 2024-05-20 RX ADMIN — CEFTAROLINE FOSAMIL 200 MG: 600 POWDER, FOR SOLUTION INTRAVENOUS at 21:20

## 2024-05-20 RX ADMIN — NEPHROCAP 1 CAPSULE: 1 CAP ORAL at 09:42

## 2024-05-20 ASSESSMENT — COGNITIVE AND FUNCTIONAL STATUS - GENERAL
DRESSING REGULAR LOWER BODY CLOTHING: A LOT
DAILY ACTIVITIY SCORE: 14
CLIMB 3 TO 5 STEPS WITH RAILING: TOTAL
HELP NEEDED FOR BATHING: A LOT
DRESSING REGULAR UPPER BODY CLOTHING: A LOT
MOBILITY SCORE: 10
STANDING UP FROM CHAIR USING ARMS: TOTAL
WALKING IN HOSPITAL ROOM: TOTAL
TOILETING: A LOT
MOVING TO AND FROM BED TO CHAIR: TOTAL
MOVING FROM LYING ON BACK TO SITTING ON SIDE OF FLAT BED WITH BEDRAILS: A LITTLE
TURNING FROM BACK TO SIDE WHILE IN FLAT BAD: A LITTLE
PERSONAL GROOMING: A LOT

## 2024-05-20 ASSESSMENT — PAIN - FUNCTIONAL ASSESSMENT
PAIN_FUNCTIONAL_ASSESSMENT: 0-10
PAIN_FUNCTIONAL_ASSESSMENT: NO/DENIES PAIN
PAIN_FUNCTIONAL_ASSESSMENT: 0-10

## 2024-05-20 ASSESSMENT — PAIN SCALES - GENERAL
PAINLEVEL_OUTOF10: 5 - MODERATE PAIN
PAINLEVEL_OUTOF10: 8

## 2024-05-20 NOTE — PROGRESS NOTES
Edwar Hemphill is a 39 y.o. male on day 20 of admission presenting with SIRS (systemic inflammatory response syndrome) (Multi).      Subjective   No issues   Resting in bed  Seen on dialysis  Does not want to answer questions    Per nurse had refused meds earlier this am        Objective     Last Recorded Vitals  /64 (Patient Position: Lying)   Pulse 95   Temp (!) 4 °C (39.2 °F)   Resp 16   Wt 70.3 kg (155 lb)   SpO2 99%   intake/Output last 3 Shifts:      Admission Weight  Weight: 65.8 kg (145 lb) (04/30/24 0825)      Image Results  Electrocardiogram, 12-lead PRN ACS symptoms  Atrial fibrillation with rapid ventricular response with premature ventricular or aberrantly conducted complexes  Nonspecific ST and T wave abnormality , probably digitalis effect  Abnormal ECG  When compared with ECG of 16-JAN-2024 07:06,  Atrial fibrillation has replaced Sinus rhythm  ST elevation now present in Inferior leads  Nonspecific T wave abnormality, worse in Lateral leads  Confirmed by Brandon Nur ([id number]) on 5/20/2024 7:36:36 AM      PHYSICAL EXAM  NEUROLOGICAL: No abnormal movements, no changes from before  HEENT: Head atraumatic, perrl,eomi, no oral lesions, no ear rash   NECK: Supple, no ln, jvp flat, thyroid palp  HEART: S1, S2, no added sound  LUNGS: dec a/e  EXTREMITIES: rt ankle edema presetn   Rt groin HD access, no drainage  ABDOMEN: Soft, nontender, bowel sound positive, no organomegaly  SKIN: No change    Relevant Results  Scheduled medications  [Held by provider] apixaban, 5 mg, oral, BID  B complex-vitamin C-folic acid, 1 capsule, oral, Daily  ceftaroline, 200 mg, intravenous, q8h  DAPTOmycin, 700 mg, intravenous, q48h  epoetin tyson or biosimilar, 100 Units/kg, subcutaneous, Every Mon/Wed/Fri  heparin, 3,100 Units, intra-catheter, After Dialysis  heparin, 3,100 Units, intra-catheter, After Dialysis  insulin glargine, 2 Units, subcutaneous, Nightly  insulin lispro, 0-5 Units, subcutaneous, TID  levothyroxine,  125 mcg, oral, Daily before breakfast  pantoprazole, 40 mg, intravenous, BID  polyethylene glycol, 17 g, oral, Daily  sennosides, 8.6 mg, oral, BID  sevelamer carbonate, 2,400 mg, oral, TID  sodium zirconium cyclosilicate, 10 g, oral, Daily      Continuous medications  oxygen, , Last Rate: 2 L/min (05/03/24 1758)      PRN medications  PRN medications: cyclobenzaprine, dextrose, dextrose, fentaNYL PF, glucagon, heparin, lidocaine, lidocaine PF, loperamide, midazolam, nitroglycerin, ondansetron, oxyCODONE, oxygen, oxygen, oxygen, simethicone  Results for orders placed or performed during the hospital encounter of 04/30/24 (from the past 96 hour(s))   CBC   Result Value Ref Range    WBC 6.6 4.4 - 11.3 x10*3/uL    nRBC 0.0 0.0 - 0.0 /100 WBCs    RBC 2.66 (L) 4.50 - 5.90 x10*6/uL    Hemoglobin 7.5 (L) 13.5 - 17.5 g/dL    Hematocrit 25.3 (L) 41.0 - 52.0 %    MCV 95 80 - 100 fL    MCH 28.2 26.0 - 34.0 pg    MCHC 29.6 (L) 32.0 - 36.0 g/dL    RDW 18.2 (H) 11.5 - 14.5 %    Platelets 251 150 - 450 x10*3/uL   Green Top   Result Value Ref Range    Extra Tube Hold for add-ons.    POCT GLUCOSE   Result Value Ref Range    POCT Glucose 114 (H) 74 - 99 mg/dL   POCT GLUCOSE   Result Value Ref Range    POCT Glucose 103 (H) 74 - 99 mg/dL   POCT GLUCOSE   Result Value Ref Range    POCT Glucose 195 (H) 74 - 99 mg/dL   Lavender Top   Result Value Ref Range    Extra Tube Hold for add-ons.    Creatine Kinase   Result Value Ref Range    Creatine Kinase 27 0 - 325 U/L   POCT GLUCOSE   Result Value Ref Range    POCT Glucose 171 (H) 74 - 99 mg/dL   Blood Culture    Specimen: Peripheral Venipuncture; Blood culture   Result Value Ref Range    Blood Culture Staphylococcus aureus (AA)     BLOOD CULTURE BOTTLE  Positive Aerobic Bottle     Gram Stain Gram positive cocci, clusters (AA)    Blood Culture    Specimen: Dialysis; Blood culture   Result Value Ref Range    Blood Culture Staphylococcus aureus (AA)     BLOOD CULTURE BOTTLE  Positive Aerobic  Bottle     Gram Stain Gram positive cocci, clusters (AA)    POCT GLUCOSE   Result Value Ref Range    POCT Glucose 142 (H) 74 - 99 mg/dL   Renal function panel   Result Value Ref Range    Glucose 108 (H) 74 - 99 mg/dL    Sodium 136 136 - 145 mmol/L    Potassium 3.6 3.5 - 5.3 mmol/L    Chloride 99 98 - 107 mmol/L    Bicarbonate 28 21 - 32 mmol/L    Anion Gap 13 10 - 20 mmol/L    Urea Nitrogen 15 6 - 23 mg/dL    Creatinine 3.83 (H) 0.50 - 1.30 mg/dL    eGFR 19 (L) >60 mL/min/1.73m*2    Calcium 8.1 (L) 8.6 - 10.3 mg/dL    Phosphorus 3.2 2.5 - 4.9 mg/dL    Albumin 2.1 (L) 3.4 - 5.0 g/dL   POCT GLUCOSE   Result Value Ref Range    POCT Glucose 128 (H) 74 - 99 mg/dL   POCT GLUCOSE   Result Value Ref Range    POCT Glucose 103 (H) 74 - 99 mg/dL   POCT GLUCOSE   Result Value Ref Range    POCT Glucose 148 (H) 74 - 99 mg/dL   POCT GLUCOSE   Result Value Ref Range    POCT Glucose 160 (H) 74 - 99 mg/dL   Renal Function Panel   Result Value Ref Range    Glucose 124 (H) 74 - 99 mg/dL    Sodium 132 (L) 136 - 145 mmol/L    Potassium 3.9 3.5 - 5.3 mmol/L    Chloride 98 98 - 107 mmol/L    Bicarbonate 23 21 - 32 mmol/L    Anion Gap 15 10 - 20 mmol/L    Urea Nitrogen 15 6 - 23 mg/dL    Creatinine 4.76 (H) 0.50 - 1.30 mg/dL    eGFR 15 (L) >60 mL/min/1.73m*2    Calcium 8.7 8.6 - 10.3 mg/dL    Phosphorus 4.6 2.5 - 4.9 mg/dL    Albumin 2.3 (L) 3.4 - 5.0 g/dL   POCT GLUCOSE   Result Value Ref Range    POCT Glucose 106 (H) 74 - 99 mg/dL   POCT GLUCOSE   Result Value Ref Range    POCT Glucose 90 74 - 99 mg/dL   CBC   Result Value Ref Range    WBC 5.8 4.4 - 11.3 x10*3/uL    nRBC 0.0 0.0 - 0.0 /100 WBCs    RBC 2.88 (L) 4.50 - 5.90 x10*6/uL    Hemoglobin 7.9 (L) 13.5 - 17.5 g/dL    Hematocrit 25.7 (L) 41.0 - 52.0 %    MCV 89 80 - 100 fL    MCH 27.4 26.0 - 34.0 pg    MCHC 30.7 (L) 32.0 - 36.0 g/dL    RDW 17.5 (H) 11.5 - 14.5 %    Platelets 207 150 - 450 x10*3/uL   SST TOP   Result Value Ref Range    Extra Tube Hold for add-ons.    POCT GLUCOSE    Result Value Ref Range    POCT Glucose 65 (L) 74 - 99 mg/dL   POCT GLUCOSE   Result Value Ref Range    POCT Glucose 86 74 - 99 mg/dL                Assessment/Plan        This patient has a central line   Reason for the central line remaining today? Dialysis/Hemapheresis      Principal Problem:    SIRS (systemic inflammatory response syndrome) (Multi)    T2dm  Anemia renal origin   Oa  Pvd  Rt upper ext amputation and left lower ext amputation    Input noted from renal  Concern of line infection  Plan noted per renal of interventional radiology to take out the temporary dialysis line.   ABX as per ID    -Continue current treatment as ordered. Will make adjustments as necessary.          Malnutrition Diagnosis Status: New  Malnutrition Diagnosis: Severe malnutrition related to chronic disease or condition  As Evidenced by: 18% weight loss over 8 months, intake <75% meals for > 1 month.  I agree with the dietitian's malnutrition diagnosis.     Plan of care discussed with: Provider, RN, Patient    Patient case and plan of care discussed with Dr. DAWNA Estrada.    AMARILYS Haas - CNP  -In collaboration with Dr. DAWNA Estrada    [facility]  Office: [phone]  Fax: [phone]  .  I have reviewed the above note obtained and documented by the NP/PA and I personally participated in the key components. I have discussed the case and management of the patient's care. Changes made to the note, and all key components of history and physical/progress note done by me.  [facility] nursing  El Estrada MD

## 2024-05-20 NOTE — PROGRESS NOTES
05/20/24 1242   Discharge Planning   Patient expects to be discharged to: [facility]     Per notes blood cx 5/17 still + with MRSA, temp HD cath per notes removal , patient still on IV abx once med ready can discharge back to [facility]

## 2024-05-20 NOTE — CARE PLAN
The clinical goals for the shift include:  patient will remain safe and free from falls/injury this shift  Problem: Safety  Goal: Patient will be injury free during hospitalization  5/20/2024 0532 by Yojana Salas RN  Outcome: Progressing  5/20/2024 0531 by Yojana Salas RN  Outcome: Progressing     Problem: Daily Care  Goal: Daily care needs are met  5/20/2024 0532 by Yojana Salas RN  Outcome: Progressing  5/20/2024 0531 by Yojana Salas RN  Outcome: Progressing   The patient's goals for the shift include    Patient remained safe and free from falls/injury this shift. Patient had no c/o pain overnight. Patient resting in bed comfortably. Patient refused CHG bath.

## 2024-05-20 NOTE — POST-PROCEDURE NOTE
Report to Receiving RN:    Report To: Myrna Aguirre RN  Time Report Called: [id number]  Hand-Off Communication: Pt remove 1.4L, /690, HR 88  Complications During Treatment: No  Ultrafiltration Treatment: No  Medications Administered During Dialysis: No  Blood Products Administered During Dialysis: No  Labs Sent During Dialysis: Yes  Heparin Drip Rate Changes: No  Dialysis Catheter Dressing: Clean and Dry  Last Dressing Change: 5/18/24    Electronic Signatures:   (Signed Toney Fitzpatrick OCDT)   Authored:    (Signed )   Authored:     Last Updated: 2:34 PM by TONEY FITZPATRICK

## 2024-05-20 NOTE — CARE PLAN
Problem: Safety  Goal: Patient will be injury free during hospitalization  Outcome: Progressing   The patient's goals for the shift include      The clinical goals for the shift include patient will remain safe and free from falls/injury this shift    Over the shift, the patient did make progress toward the following goals.

## 2024-05-20 NOTE — PROCEDURES
"Patient seen on dialysis.  He is dialyzing via a right femoral temporary dialysis line.  Unfortunately he continues to have persistent MRSA bacteremia with cultures from 5/17 being positive.  No repeat cultures since.  He is planned for dialysis line removal following dialysis today.  He is on antimicrobial coverage with daptomycin and Teflaro.  He is dialyzing on F1 60 kidney, BFR//600 mL/minute, 3K bath, 2.5 calcium with a target UF of 1 L as tolerated.  He will get erythropoietin and is on a phosphorus binder.  Nephrology will follow closely with his care.    /64 (Patient Position: Lying)   Pulse 95   Temp (!) 4 °C (39.2 °F)   Resp 16   Ht 1.753 m (5' 9.02\")   Wt 70.3 kg (155 lb)   SpO2 99%   BMI 22.88 kg/m²     "

## 2024-05-20 NOTE — PROGRESS NOTES
"  INFECTIOUS DISEASE DAILY PROGRESS NOTE    SUBJECTIVE:    Blood cx were still positive and he is on combination abx therapy with Daptomycin/Ceftaroline now. Plans for HD today and removal of temp HD cath. He has no new complaints. Afebrile. WBC remains normal.    OBJECTIVE:  VITALS (Last 24 Hours)  /83 (BP Location: Left arm, Patient Position: Lying)   Pulse 100   Temp 36.7 °C (98 °F) (Oral)   Resp 18   Ht 1.753 m (5' 9.02\")   Wt 70.3 kg (155 lb)   SpO2 98%   BMI 22.88 kg/m²     PHYSICAL EXAM:  Gen - laying in bed  Abd - soft, no tenderness, BS present  RLE - s/p AKA  RUE - s/p amputation  Skin - no rash     ABX: IV Daptomycin and Ceftaroline    LABS:  Lab Results   Component Value Date    WBC 5.0 05/20/2024    HGB 7.7 (L) 05/20/2024    HCT 25.3 (L) 05/20/2024    MCV 89 05/20/2024     (L) 05/20/2024     Lab Results   Component Value Date    GLUCOSE 68 (L) 05/20/2024    CALCIUM 7.8 (L) 05/20/2024     (L) 05/20/2024    K 3.5 05/20/2024    CO2 29 05/20/2024    CL 98 05/20/2024    BUN 10 05/20/2024    CREATININE 3.26 (H) 05/20/2024     Results from last 72 hours   Lab Units 05/20/24  1126   ALBUMIN g/dL 2.3*     Estimated Creatinine Clearance: 30 mL/min (A) (by C-G formula based on SCr of 3.26 mg/dL (H)).    ASSESSMENT/PLAN:     CLABSI (HD cath POA) due to MRSA - line exchanged 5/3. Not having a full line holiday because of risk of losing HD access. TTE without endocarditis. Repeat blood cx 5/5 and 5/7 still positive. HD cath removed on 5/9. 5/10 blood cx still positive. New temp HD cath placed 5/13. POLI negative for endocarditis 5/14. Blood cx 5/14 positive. Dapto/Ceftaroline started 5/16 and then 5/17 blood cx still positive.  Lumbar Spine Pain - CT without epidural abscess. MRI without OM/discitis.  DM II with DKA - DKA resolved     Combination abx therapy Daptomycin/Ceftaroline still. Temp HD cath will be removed today. Will check blood cx x2 peripheral tomorrow AM.     Monitoring for " adverse effects of abx such as rash/itching/diarrhea - none apparent.     Will follow. Thanks! D/w Dr. New and Dr. Janis Jauregui MD  [facility]  Office #[phone]

## 2024-05-20 NOTE — PRE-PROCEDURE NOTE
Report from Sending RN:    Report From: Myrna PEREZ  Recent Surgery of Procedure: No  Baseline Level of Consciousness (LOC): AoX4  Oxygen Use: No,   Type:NA  Diabetic: Yes  Last BP Med Given Day of Dialysis: SEE MAR  Last Pain Med Given: SEE MAR  Lab Tests to be Obtained with Dialysis: No  Blood Transfusion to be Given During Dialysis: No  Available IV Access: Yes  Medications to be Administered During Dialysis: No  Continuous IV Infusion Running: Yes  Restraints on Currently or in the Last 24 Hours: No  Hand-Off Communication: Pt tx is 3.5hrs, attempting to remove 1L  Dialysis Catheter Dressing: Clean and Dry  Last Dressing Change: 5/15/24

## 2024-05-20 NOTE — PROGRESS NOTES
Edwar Hemphill is a 39 y.o. male on day 20 of admission presenting with SIRS (systemic inflammatory response syndrome) (Multi).      Subjective   No issues        Objective     Last Recorded Vitals  /64 (Patient Position: Lying)   Pulse 95   Temp 36.5 °C (97.7 °F) (Oral)   Resp 16   Wt 70.3 kg (155 lb)   SpO2 99%   intake/Output last 3 Shifts:      Admission Weight  Weight: 65.8 kg (145 lb) (04/30/24 0825)      Image Results  Electrocardiogram, 12-lead PRN ACS symptoms  Atrial fibrillation with rapid ventricular response with premature ventricular or aberrantly conducted complexes  Nonspecific ST and T wave abnormality , probably digitalis effect  Abnormal ECG  When compared with ECG of 16-JAN-2024 07:06,  Atrial fibrillation has replaced Sinus rhythm  ST elevation now present in Inferior leads  Nonspecific T wave abnormality, worse in Lateral leads      PHYSICAL EXAM  NEUROLOGICAL: No abnormal movements, no changes from before  HEENT: Head atraumatic, perrl,eomi, no oral lesions, no ear rash   NECK: Supple, no ln, jvp flat, thyroid palp  HEART: S1, S2, no added sound  LUNGS: dec a/e  EXTREMITIES: rt ankle edema presetn   Rt groin HD access, no drainage  ABDOMEN: Soft, nontender, bowel sound positive, no organomegaly  SKIN: No change    Relevant Results  Scheduled medications  [Held by provider] apixaban, 5 mg, oral, BID  B complex-vitamin C-folic acid, 1 capsule, oral, Daily  ceftaroline, 200 mg, intravenous, q8h  DAPTOmycin, 700 mg, intravenous, q48h  epoetin tyson or biosimilar, 100 Units/kg, subcutaneous, Every Mon/Wed/Fri  heparin, 3,100 Units, intra-catheter, After Dialysis  heparin, 3,100 Units, intra-catheter, After Dialysis  insulin glargine, 2 Units, subcutaneous, Nightly  insulin lispro, 0-5 Units, subcutaneous, TID  levothyroxine, 125 mcg, oral, Daily before breakfast  pantoprazole, 40 mg, intravenous, BID  polyethylene glycol, 17 g, oral, Daily  sennosides, 8.6 mg, oral, BID  sevelamer  carbonate, 2,400 mg, oral, TID  sodium zirconium cyclosilicate, 10 g, oral, Daily      Continuous medications  oxygen, , Last Rate: 2 L/min (05/03/24 1758)      PRN medications  PRN medications: cyclobenzaprine, dextrose, dextrose, fentaNYL PF, glucagon, heparin, lidocaine, lidocaine PF, loperamide, midazolam, nitroglycerin, ondansetron, oxyCODONE, oxygen, oxygen, oxygen, simethicone  Results for orders placed or performed during the hospital encounter of 04/30/24 (from the past 96 hour(s))   POCT GLUCOSE   Result Value Ref Range    POCT Glucose 162 (H) 74 - 99 mg/dL   CBC   Result Value Ref Range    WBC 6.6 4.4 - 11.3 x10*3/uL    nRBC 0.0 0.0 - 0.0 /100 WBCs    RBC 2.66 (L) 4.50 - 5.90 x10*6/uL    Hemoglobin 7.5 (L) 13.5 - 17.5 g/dL    Hematocrit 25.3 (L) 41.0 - 52.0 %    MCV 95 80 - 100 fL    MCH 28.2 26.0 - 34.0 pg    MCHC 29.6 (L) 32.0 - 36.0 g/dL    RDW 18.2 (H) 11.5 - 14.5 %    Platelets 251 150 - 450 x10*3/uL   Green Top   Result Value Ref Range    Extra Tube Hold for add-ons.    POCT GLUCOSE   Result Value Ref Range    POCT Glucose 114 (H) 74 - 99 mg/dL   POCT GLUCOSE   Result Value Ref Range    POCT Glucose 103 (H) 74 - 99 mg/dL   POCT GLUCOSE   Result Value Ref Range    POCT Glucose 195 (H) 74 - 99 mg/dL   Lavender Top   Result Value Ref Range    Extra Tube Hold for add-ons.    Creatine Kinase   Result Value Ref Range    Creatine Kinase 27 0 - 325 U/L   POCT GLUCOSE   Result Value Ref Range    POCT Glucose 171 (H) 74 - 99 mg/dL   Blood Culture    Specimen: Peripheral Venipuncture; Blood culture   Result Value Ref Range    Blood Culture Staphylococcus aureus (AA)     BLOOD CULTURE BOTTLE  Positive Aerobic Bottle     Gram Stain Gram positive cocci, clusters (AA)    Blood Culture    Specimen: Dialysis; Blood culture   Result Value Ref Range    Blood Culture Staphylococcus aureus (AA)     BLOOD CULTURE BOTTLE  Positive Aerobic Bottle     Gram Stain Gram positive cocci, clusters (AA)    POCT GLUCOSE   Result  Value Ref Range    POCT Glucose 142 (H) 74 - 99 mg/dL   Renal function panel   Result Value Ref Range    Glucose 108 (H) 74 - 99 mg/dL    Sodium 136 136 - 145 mmol/L    Potassium 3.6 3.5 - 5.3 mmol/L    Chloride 99 98 - 107 mmol/L    Bicarbonate 28 21 - 32 mmol/L    Anion Gap 13 10 - 20 mmol/L    Urea Nitrogen 15 6 - 23 mg/dL    Creatinine 3.83 (H) 0.50 - 1.30 mg/dL    eGFR 19 (L) >60 mL/min/1.73m*2    Calcium 8.1 (L) 8.6 - 10.3 mg/dL    Phosphorus 3.2 2.5 - 4.9 mg/dL    Albumin 2.1 (L) 3.4 - 5.0 g/dL   POCT GLUCOSE   Result Value Ref Range    POCT Glucose 128 (H) 74 - 99 mg/dL   POCT GLUCOSE   Result Value Ref Range    POCT Glucose 103 (H) 74 - 99 mg/dL   POCT GLUCOSE   Result Value Ref Range    POCT Glucose 148 (H) 74 - 99 mg/dL   POCT GLUCOSE   Result Value Ref Range    POCT Glucose 160 (H) 74 - 99 mg/dL   Renal Function Panel   Result Value Ref Range    Glucose 124 (H) 74 - 99 mg/dL    Sodium 132 (L) 136 - 145 mmol/L    Potassium 3.9 3.5 - 5.3 mmol/L    Chloride 98 98 - 107 mmol/L    Bicarbonate 23 21 - 32 mmol/L    Anion Gap 15 10 - 20 mmol/L    Urea Nitrogen 15 6 - 23 mg/dL    Creatinine 4.76 (H) 0.50 - 1.30 mg/dL    eGFR 15 (L) >60 mL/min/1.73m*2    Calcium 8.7 8.6 - 10.3 mg/dL    Phosphorus 4.6 2.5 - 4.9 mg/dL    Albumin 2.3 (L) 3.4 - 5.0 g/dL   POCT GLUCOSE   Result Value Ref Range    POCT Glucose 106 (H) 74 - 99 mg/dL   POCT GLUCOSE   Result Value Ref Range    POCT Glucose 90 74 - 99 mg/dL   CBC   Result Value Ref Range    WBC 5.8 4.4 - 11.3 x10*3/uL    nRBC 0.0 0.0 - 0.0 /100 WBCs    RBC 2.88 (L) 4.50 - 5.90 x10*6/uL    Hemoglobin 7.9 (L) 13.5 - 17.5 g/dL    Hematocrit 25.7 (L) 41.0 - 52.0 %    MCV 89 80 - 100 fL    MCH 27.4 26.0 - 34.0 pg    MCHC 30.7 (L) 32.0 - 36.0 g/dL    RDW 17.5 (H) 11.5 - 14.5 %    Platelets 207 150 - 450 x10*3/uL   SST TOP   Result Value Ref Range    Extra Tube Hold for add-ons.    POCT GLUCOSE   Result Value Ref Range    POCT Glucose 65 (L) 74 - 99 mg/dL                 Assessment/Plan        This patient has a central line   Reason for the central line remaining today? 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  I agree with the dietitian's malnutrition diagnosis.     Cont with current   HD tomorrow   Renal and ID following     El Estrada MD

## 2024-05-20 NOTE — POST-PROCEDURE NOTE
Interventional Radiology Brief Postprocedure Note    Attending: Henry Clemens CNP    Assistant: none    Diagnosis: bacteremia    Description of procedure: The procedure was explained, risks, benefits and alternatives to the procedure were discussed. Patient signed consent. The old dressing was removed, the site was prepped with chlorhexidine. Using sterile technique, the sutures were removed. The catheter was removed and pressure held at the insertion site. No immediate complications were noted.         Anesthesia:  none    Complications: None    Estimated Blood Loss: none    Medications  As of 05/20/24 1611      piperacillin-tazobactam-dextrose (Zosyn) IV 3.375 g (mL/hr) Total dose:  3.38 g* Dosing weight:  65.8   *From user-documented volume     Date/Time Rate/Dose/Volume Action       04/30/24  1411 3.375 g - 100 mL/hr (over 30 min) New Bag      1441 50 mL Stopped               vancomycin (Vancocin) 1,000 mg in dextrose 5% water 200 mL (mL/hr) Total volume:  Not documented* Dosing weight:  65.8   *Total volume has not been documented. View each administration to see the amount administered.     Date/Time Rate/Dose/Volume Action       04/30/24  1451 1,000 mg - 200 mL/hr (over 60 min) New Bag      1551  (over 60 min) Stopped               vancomycin (Vancocin) 1,000 mg in dextrose 5% water 200 mL (mL/hr) Total dose:  1,000 mg* Dosing weight:  65.8   *From user-documented volume     Date/Time Rate/Dose/Volume Action       05/03/24  2155 1,000 mg - 200 mL/hr (over 60 min) - 200 mL New Bag      2255  (over 60 min) Stopped               vancomycin (Vancocin) 1,000 mg in dextrose 5% water 200 mL (mL/hr) Total volume:  Not documented* Dosing weight:  70.5   *Total volume has not been documented. View each administration to see the amount administered.     Date/Time Rate/Dose/Volume Action       05/06/24  2129 1,000 mg - 200 mL/hr (over 60 min) New Bag      2229  (over 60 min) Stopped               acetaminophen (Tylenol)  tablet 975 mg (mg) Total dose:  975 mg Dosing weight:  65.8      Date/Time Rate/Dose/Volume Action       04/30/24  1411 975 mg Given               piperacillin-tazobactam-dextrose (Zosyn) IV 2.25 g (g) Total dose:  0 g* Dosing weight:  65.8   *Administration not included in total     Date/Time Rate/Dose/Volume Action       05/01/24  1254 *2.25 g - 100 mL/hr (over 30 min) Missed               piperacillin-tazobactam-dextrose (Zosyn) IV 2.25 g (mL/hr) Total volume:  Not documented* Dosing weight:  65.8   *Total volume has not been documented. View each administration to see the amount administered.     Date/Time Rate/Dose/Volume Action       05/01/24  0521 2.25 g - 100 mL/hr (over 30 min) New Bag      0551  (over 30 min) Stopped               apixaban (Eliquis) tablet 5 mg (mg) Total dose:  35 mg* Dosing weight:  65.8   *Administration not included in total     Date/Time Rate/Dose/Volume Action       04/30/24  2252 5 mg Given     05/01/24  0954 5 mg Given      2212 5 mg Given     05/02/24  0855 5 mg Given      2152 5 mg Given     05/03/24  1329 5 mg Given      2156 5 mg Given     05/04/24  0900 *5 mg Missed      1858 *Not included in total Held by provider      2100 *5 mg Missed     05/05/24  0900 *5 mg Missed      2100 *Not included in total Automatically Held     05/06/24  0900 *5 mg Missed      2100 *5 mg Missed     05/07/24  0900 *5 mg Missed      2100 *5 mg Missed     05/08/24  0900 *5 mg Missed      2100 *5 mg Missed     05/09/24  0900 *5 mg Missed      2100 *5 mg Missed     05/10/24  0900 *5 mg Missed      2100 *5 mg Missed     05/11/24  0900 *5 mg Missed      2100 *5 mg Missed     05/12/24  0900 *5 mg Missed      2100 *5 mg Missed     05/13/24  0900 *5 mg Missed      2100 *Not included in total Automatically Held     05/14/24  0900 *5 mg Missed      2100 *5 mg Missed     05/15/24  0900 *5 mg Missed      2100 *5 mg Missed     05/16/24  0900 *5 mg Missed      2100 *5 mg Missed     05/17/24  0900 *5 mg Missed       2100 *5 mg Missed     05/18/24  0900 *5 mg Missed      2100 *Not included in total Automatically Held     05/19/24  0900 *Not included in total Automatically Held      2100 *Not included in total Automatically Held     05/20/24 0900 *Not included in total Automatically Held               atorvastatin (Lipitor) tablet 40 mg (mg) Total dose:  480 mg* Dosing weight:  65.8   *Administration not included in total     Date/Time Rate/Dose/Volume Action       04/30/24  2100 *40 mg Missed     05/01/24  2100 *40 mg Missed     05/02/24  2152 40 mg Given     05/03/24  2156 40 mg Given     05/04/24  2100 *40 mg Missed     05/05/24  2100 *40 mg Missed     05/06/24  2128 40 mg Given     05/07/24  2130 40 mg Given     05/08/24  2201 40 mg Given     05/09/24  2223 40 mg Given     05/10/24  2100 40 mg Given     05/11/24  2110 40 mg Given     05/12/24  2239 40 mg Given     05/13/24  2335 40 mg Given     05/14/24  2215 40 mg Given     05/15/24  1945 40 mg Given               B complex-vitamin C-folic acid (Nephrocaps) capsule 1 capsule (capsule) Total dose:  14 capsule* Dosing weight:  65.8   *Administration not included in total     Date/Time Rate/Dose/Volume Action       04/30/24  2045 *1 capsule Missed     05/01/24  0900 1 capsule Given     05/02/24  0854 1 capsule Given     05/03/24  0900 *1 capsule Missed     05/04/24  0900 *1 capsule Missed     05/05/24  0900 *1 capsule Missed     05/06/24  0825 1 capsule Given     05/07/24  0909 1 capsule Given     05/08/24  0911 1 capsule Given     05/09/24  0859 1 capsule Given     05/10/24  1047 1 capsule Given     05/11/24  0915 1 capsule Given     05/12/24  0852 1 capsule Given     05/13/24  0900 *1 capsule Missed     05/14/24  0919 1 capsule Given     05/15/24  0900 *1 capsule Missed     05/16/24  0956 1 capsule Given     05/17/24  0900 *1 capsule Missed     05/18/24  0858 1 capsule Given     05/19/24  0904 1 capsule Given     05/20/24  0900 *1 capsule Missed      0942 1 capsule Given                levothyroxine (Synthroid, Levoxyl) tablet 125 mcg (mcg) Total dose:  2,250 mcg* Dosing weight:  65.8   *Administration not included in total     Date/Time Rate/Dose/Volume Action       05/01/24  0630 125 mcg Given     05/02/24  0646 *125 mcg Missed     05/03/24  0610 125 mcg Given     05/04/24  0624 125 mcg Given     05/05/24  0621 125 mcg Given     05/06/24  0601 125 mcg Given     05/07/24  0909 125 mcg Given     05/08/24  0912 125 mcg Given     05/09/24  0608 125 mcg Given     05/10/24  0612 125 mcg Given     05/11/24  0633 125 mcg Given     05/12/24  0643 125 mcg Given     05/13/24  0700 *125 mcg Missed     05/14/24  0656 125 mcg Given     05/15/24  0646 125 mcg Given     05/16/24  0734 125 mcg Given     05/17/24  0640 125 mcg Given     05/18/24  0600 125 mcg Given     05/19/24  0638 125 mcg Given     05/20/24  0708 125 mcg Given               polyethylene glycol (Glycolax, Miralax) packet 17 g (g) Total dose:  34 g* Dosing weight:  65.8   *Administration not included in total     Date/Time Rate/Dose/Volume Action       05/01/24  0700 *17 g Missed     05/02/24  0646 17 g Given     05/03/24  0700 *17 g Missed     05/04/24  0624 17 g Given     05/05/24  0621 *17 g Missed     05/06/24  0700 *17 g Missed     05/07/24  0700 *17 g Missed     05/08/24  0700 *17 g Missed     05/09/24  0900 *17 g Missed     05/10/24  0900 *17 g Missed     05/11/24  0916 *17 g Missed     05/12/24  0900 *17 g Missed     05/13/24  0900 *17 g Missed     05/14/24  0919 *17 g Missed     05/15/24  0900 *17 g Missed     05/16/24  1001 *17 g Missed     05/17/24  0900 *17 g Missed     05/18/24  0900 *17 g Missed     05/19/24  0900 *17 g Missed     05/20/24  0943 *17 g Missed               sennosides (Senokot) tablet 8.6 mg (mg) Total dose:  43 mg* Dosing weight:  65.8   *Administration not included in total     Date/Time Rate/Dose/Volume Action       04/30/24  2100 *8.6 mg Missed     05/01/24  0900 *8.6 mg Missed      2100 *8.6 mg  Missed     05/02/24  0859 *8.6 mg Missed      2100 *8.6 mg Missed     05/03/24  0900 *8.6 mg Missed      2156 8.6 mg Given     05/04/24  0900 *8.6 mg Missed      2100 *8.6 mg Missed     05/05/24  0900 *8.6 mg Missed      2100 *8.6 mg Missed     05/06/24  0824 8.6 mg Given      2100 *8.6 mg Missed     05/07/24  0900 *8.6 mg Missed      2130 *8.6 mg Missed     05/08/24  0900 *8.6 mg Missed      2100 *8.6 mg Missed     05/09/24  0900 *8.6 mg Missed      2223 *8.6 mg Missed     05/10/24  0900 *8.6 mg Missed      2100 *8.6 mg Missed     05/11/24  0915 *8.6 mg Missed      2100 *8.6 mg Missed     05/12/24  0900 *8.6 mg Missed      2239 8.6 mg Given     05/13/24  0900 *8.6 mg Missed      2100 *8.6 mg Missed     05/14/24  0919 *8.6 mg Missed      2100 *8.6 mg Missed     05/15/24  0900 *8.6 mg Missed      2100 *8.6 mg Missed     05/16/24  1002 *8.6 mg Missed      2100 *8.6 mg Missed     05/17/24  0900 *8.6 mg Missed      2100 *8.6 mg Missed     05/18/24  0900 *8.6 mg Missed      2133 8.6 mg Given     05/19/24  0900 *8.6 mg Missed      2100 *8.6 mg Missed     05/20/24  0942 8.6 mg Given               sevelamer carbonate (Renvela) tablet 2,400 mg (mg) Total dose:  67,200 mg* Dosing weight:  65.8   *Administration not included in total     Date/Time Rate/Dose/Volume Action       05/01/24  0954 *2,400 mg Missed      1200 *2,400 mg Missed      1823 *2,400 mg Missed     05/02/24  0855 *2,400 mg Missed      1150 2,400 mg Given      1700 *2,400 mg Missed     05/03/24  0800 *2,400 mg Missed      1200 *2,400 mg Missed      1700 *2,400 mg Missed     05/04/24  0800 *2,400 mg Missed      1200 *2,400 mg Missed      1700 *2,400 mg Missed     05/05/24  0800 *2,400 mg Missed      1200 *2,400 mg Missed      1644 2,400 mg Given     05/06/24  0824 2,400 mg Given      1200 *2,400 mg Missed      1700 *2,400 mg Missed     05/07/24  0909 *2,400 mg Missed      1311 2,400 mg Given      1703 2,400 mg Given     05/08/24  0800 *2,400 mg Missed      1200  *2,400 mg Missed      1731 2,400 mg Given     05/09/24  0859 2,400 mg Given      1332 2,400 mg Given      1725 2,400 mg Given     05/10/24  0800 *2,400 mg Missed      1252 2,400 mg Given      1725 2,400 mg Given     05/11/24  0800 *2,400 mg Missed      1238 2,400 mg Given      1708 2,400 mg Given     05/12/24  0852 2,400 mg Given      1216 2,400 mg Given      1712 2,400 mg Given     05/13/24  0800 *2,400 mg Missed      1200 *2,400 mg Missed      1600 2,400 mg Given     05/14/24  0919 *2,400 mg Missed      1241 *2,400 mg Missed      1718 2,400 mg Given     05/15/24  0800 *2,400 mg Missed      1308 2,400 mg Given      1756 2,400 mg Given     05/16/24  1001 *2,400 mg Missed      1412 2,400 mg Given      1700 *2,400 mg Missed     05/17/24  0800 *2,400 mg Missed      1200 *2,400 mg Missed     05/18/24  0857 2,400 mg Given      1207 2,400 mg Given      1724 2,400 mg Given     05/19/24  0904 2,400 mg Given      1132 2,400 mg Given      1801 2,400 mg Given     05/20/24  0941 2,400 mg Given      1200 *2,400 mg Missed               insulin lispro (HumaLOG) injection 0-5 Units (Units) Total dose:  23 Units Dosing weight:  65.8      Date/Time Rate/Dose/Volume Action       05/01/24  0955 5 Units Given      1253 5 Units Given      1824 5 Units Given     05/02/24  0856 3 Units Given      1302 3 Units Given      1831 2 Units Given     05/03/24  0800 *Not included in total Missed      1200 *Not included in total Missed      1700 *Not included in total Missed     05/04/24  0800 *Not included in total Missed               insulin lispro (HumaLOG) injection 5 Units (Units) Total dose:  5 Units Dosing weight:  65.8      Date/Time Rate/Dose/Volume Action       05/01/24  1318 5 Units Given               insulin lispro (HumaLOG) injection 6 Units (Units) Total dose:  6 Units Dosing weight:  65.8      Date/Time Rate/Dose/Volume Action       05/04/24  0905 6 Units Given               insulin lispro (HumaLOG) injection 0-5 Units (Units) Total  dose:  11 Units* Dosing weight:  70.5   *Administration not included in total     Date/Time Rate/Dose/Volume Action       05/05/24  1330 1 Units Given      1644 1 Units Given     05/06/24  0800 *Not included in total Missed      1200 *Not included in total Missed      1700 *Not included in total Missed     05/07/24  0800 *Not included in total Missed      1311 1 Units Given      1700 *Not included in total Missed     05/08/24  0800 *Not included in total Missed      1200 *Not included in total Missed      1732 1 Units Given     05/09/24  0800 *Not included in total Missed      1200 *Not included in total Missed      1700 *Not included in total Missed     05/10/24  1048 1 Units Given      1253 2 Units Given      1700 *Not included in total Missed     05/11/24  0800 *Not included in total Missed      1238 *1 Units Missed      1712 1 Units Given     05/12/24  0800 *Not included in total Missed      1200 *Not included in total Missed      1712 *1 Units Missed     05/13/24  0800 *Not included in total Missed      1200 *Not included in total Missed      1700 *Not included in total Missed     05/14/24  0918 *2 Units Missed      1243 *0 Units Missed      1744 *2 Units Missed     05/15/24  0800 *Not included in total Missed      1308 1 Units Given      1755 1 Units Given     05/16/24  0956 1 Units Given      1200 *Not included in total Missed      1700 *Not included in total Missed     05/17/24  0800 *Not included in total Missed      1200 *Not included in total Missed     05/18/24  0800 *Not included in total Missed      1200 *Not included in total Missed      1700 *Not included in total Missed     05/19/24  0800 *Not included in total Missed      1200 *Not included in total Missed      1700 *Not included in total Missed     05/20/24  0800 *Not included in total Missed      1200 *Not included in total Missed               oxyCODONE (Roxicodone) immediate release tablet 5 mg (mg) Total dose:  195 mg Dosing weight:  65.8       Date/Time Rate/Dose/Volume Action       04/30/24  2252 5 mg Given     05/01/24  0520 5 mg Given     05/02/24  2155 5 mg Given     05/03/24  1029 5 mg Given     05/05/24  2203 5 mg Given     05/06/24  0823 5 mg Given      1240 5 mg Given      2128 5 mg Given     05/07/24  0929 5 mg Given      1425 5 mg Given      2130 5 mg Given     05/08/24  0913 5 mg Given      1326 5 mg Given      2201 5 mg Given     05/09/24  0859 5 mg Given      1725 5 mg Given     05/10/24  0911 5 mg Given      1724 5 mg Given      2230 5 mg Given     05/11/24  0633 5 mg Given      1036 5 mg Given      2110 5 mg Given     05/12/24  0647 5 mg Given      2010 5 mg Given     05/13/24  0826 5 mg Given      1556 5 mg Given      2335 5 mg Given     05/14/24  0947 5 mg Given      1717 5 mg Given      2215 5 mg Given     05/15/24  0646 5 mg Given      1415 5 mg Given      1946 5 mg Given     05/16/24  0424 5 mg Given      1417 5 mg Given      2035 5 mg Given     05/17/24  0352 5 mg Given      2236 5 mg Given     05/18/24  1207 5 mg Given               vancomycin in dextrose 5 % (Vancocin) IVPB 500 mg (mL/hr) Total dose:  500 mg* Dosing weight:  65.8   *From user-documented volume     Date/Time Rate/Dose/Volume Action       05/02/24  0253 500 mg - 200 mL/hr (over 30 min) New Bag      0323 100 mL Stopped               vancomycin in dextrose 5 % (Vancocin) IVPB 500 mg (mL/hr) Total volume:  Not documented* Dosing weight:  70.5   *Total volume has not been documented. View each administration to see the amount administered.     Date/Time Rate/Dose/Volume Action       05/11/24  0915 500 mg - 200 mL/hr (over 30 min) New Bag      0945  (over 30 min) Stopped     05/13/24  2336 500 mg - 200 mL/hr (over 30 min) New Bag     05/14/24  0006  (over 30 min) Stopped               vancomycin in dextrose 5 % (Vancocin) IVPB 500 mg (mL/hr) Total volume:  Not documented* Dosing weight:  74.9   *Total volume has not been documented. View each administration to see the  amount administered.     Date/Time Rate/Dose/Volume Action       05/15/24  1821 500 mg - 200 mL/hr (over 30 min) New Bag      1851  (over 30 min) Stopped               heparin 1,000 unit/mL injection 3,100 Units (Units) Total dose:  21,700 Units* Dosing weight:  65.8   *Administration not included in total     Date/Time Rate/Dose/Volume Action       05/02/24  0248 3,100 Units Given     05/03/24  1210 3,100 Units Given     05/05/24  0045 *3,100 Units Missed     05/06/24  1030 3,100 Units Given     05/08/24  0045 *3,100 Units Missed      1257 3,100 Units Given     05/11/24  0045 *3,100 Units Missed     05/14/24  0045 *3,100 Units Missed     05/15/24  1205 3,100 Units Given     05/17/24  1500 3,100 Units Given     05/20/24  0045 *3,100 Units Missed      1223 3,100 Units Given               heparin 1,000 unit/mL injection 3,100 Units (Units) Total dose:  21,700 Units* Dosing weight:  65.8   *Administration not included in total     Date/Time Rate/Dose/Volume Action       05/02/24  0247 3,100 Units Given     05/03/24  1210 3,100 Units Given     05/05/24  0045 *3,100 Units Missed     05/06/24  1029 3,100 Units Given     05/08/24  0045 *3,100 Units Missed      1256 3,100 Units Given     05/11/24  0045 *3,100 Units Missed     05/14/24  0045 *3,100 Units Missed     05/15/24  1205 3,100 Units Given     05/17/24  1500 3,100 Units Given     05/20/24  0045 *3,100 Units Missed      1223 3,100 Units Given               heparin 1,000 unit/mL injection (Units) Total dose:  9,400 Units      Date/Time Rate/Dose/Volume Action       05/03/24 1823 2,400 Units Given      1824 2,400 Units Given     05/13/24  1522 1,000 Units Given      2327 1,800 Units Given      2329 1,800 Units Given               oxygen (O2) therapy (L/min) Total volume:  Not documented*   *Total volume has not been documented. View each administration to see the amount administered.     Date/Time Rate/Dose/Volume Action       05/03/24  1758 2 L/min - 120,000 mL/hr New  Bag               oxygen (O2) therapy (L/min) Total volume:  Not documented* Dosing weight:  65.8   *Total volume has not been documented. View each administration to see the amount administered.     Date/Time Rate/Dose/Volume Action       05/04/24  1345 2 L/min Start      2000 2 L/min Rate Verify Medical Gas     05/05/24  0800 2 L/min Rate Verify Medical Gas     05/08/24  0800 21 percent Start     05/09/24  0810 21 percent Start      2001  Stopped     05/10/24  2000 2 L/min Rate Verify Medical Gas               fentaNYL PF (Sublimaze) injection (mcg) Total dose:  75 mcg      Date/Time Rate/Dose/Volume Action       05/03/24  1816 50 mcg Given     05/13/24  1513 25 mcg Given               midazolam (Versed) injection (mg) Total dose:  1.5 mg      Date/Time Rate/Dose/Volume Action       05/03/24  1816 1 mg Given     05/13/24  1513 0.5 mg Given               lidocaine PF (Xylocaine) 10 mg/mL (1 %) injection (mL) Total volume:  5 mL      Date/Time Rate/Dose/Volume Action       05/03/24  1816 5 mL Given               dextrose 50 % injection 12.5 g (g) Total dose:  12.5 g Dosing weight:  65.8      Date/Time Rate/Dose/Volume Action       05/20/24  1219 12.5 g Given               insulin regular (HumuLIN, NovoLIN) bolus from bag 0-10 Units (Units) Total dose:  Cannot be calculated* Dosing weight:  65.8   *Total user-documented volume 354.47 mL may contain volume from other administrations     Date/Time Rate/Dose/Volume Action       05/04/24  1045 0 Units (over 2 min) Bolus from Bag               insulin regular 100 unit/100 mL (1 unit/mL) in 0.9 % NaCl infusion (Units/hr) Total dose:  Cannot be calculated* Dosing weight:  65.8   *Total user-documented volume 354.47 mL may contain volume from other administrations     Date/Time Rate/Dose/Volume Action       05/04/24  1102 6 Units/hr - 6 mL/hr New Bag      1153 9 Units/hr - 9 mL/hr Rate Change      1317 13.5 Units/hr - 13.5 mL/hr Rate Change      1422 13.5 Units/hr - 13.5  mL/hr Rate Change      1501 17 Units/hr - 17 mL/hr Rate Change      1614 17 Units/hr - 17 mL/hr Rate Verify      1725 17 Units/hr - 17 mL/hr New Bag      1803 17 Units/hr - 17 mL/hr Rate Change      1856 17 Units/hr - 17 mL/hr Rate Change      2009 12 Units/hr - 12 mL/hr Rate Change      2114 12 Units/hr - 12 mL/hr New Bag      2120 8 Units/hr - 8 mL/hr Rate Change      2220  Stopped     05/05/24  0002 3 Units/hr - 3 mL/hr Restarted      0545 3 Units/hr - 3 mL/hr Rate Verify      0700 3 Units/hr - 3 mL/hr Rate Verify      0800  Stopped               lactated Ringer's bolus 500 mL (mL/hr) Total volume:  Not documented* Dosing weight:  65.8   *Total volume has not been documented. View each administration to see the amount administered.     Date/Time Rate/Dose/Volume Action       05/04/24  1104 500 mL - 250 mL/hr (over 120 min) New Bag      1304  (over 120 min) Stopped               lactated Ringer's bolus 1,000 mL (mL/hr) Total volume:  Not documented* Dosing weight:  65.8   *Total volume has not been documented. View each administration to see the amount administered.     Date/Time Rate/Dose/Volume Action       05/04/24  1347 1,000 mL - 500 mL/hr (over 120 min) New Bag      1547  (over 120 min) Stopped               lactated Ringer's bolus 500 mL (mL/hr) Total volume:  579.17 mL* Dosing weight:  70.5   *From user-documented volume     Date/Time Rate/Dose/Volume Action       05/05/24  1352 500 mL - 250 mL/hr (over 120 min) New Bag      1552  (over 120 min) Stopped      1611 579.17 mL                metoprolol tartrate (Lopressor) injection 5 mg (mg) Total dose:  2.5 mg Dosing weight:  65.8      Date/Time Rate/Dose/Volume Action       05/04/24  1105 2.5 mg Given               metoprolol tartrate (Lopressor) injection  - Omnicell Override Pull (mg) Total dose:  2.5 mg      Date/Time Rate/Dose/Volume Action       05/04/24  1105 2.5 mg Given               insulin regular (HumuLIN R,NovoLIN R) injection 10 Units (Units)  Total dose:  10 Units Dosing weight:  65.8      Date/Time Rate/Dose/Volume Action       05/04/24  1045 10 Units Given               insulin regular (HumuLIN R,NovoLIN R) injection  - Omnicell Override Pull (Units) Total dose:  10 Units      Date/Time Rate/Dose/Volume Action       05/04/24  1045 10 Units Given               norepinephrine (Levophed) 8 mg in dextrose 5% 250 mL (0.032 mg/mL) infusion (premix) (mcg/kg/min) Total dose:  Cannot be calculated* Dosing weight:  65.8   *Total user-documented volume 290.2 mL may contain volume from other administrations     Date/Time Rate/Dose/Volume Action       05/04/24  1103 0.01 mcg/kg/min - 1.23 mL/hr New Bag      1150 0.02 mcg/kg/min - 2.47 mL/hr Rate Change      1153 0.03 mcg/kg/min - 3.7 mL/hr Rate Change      1228 0.04 mcg/kg/min - 4.94 mL/hr Rate Change      1508 0.03 mcg/kg/min - 3.7 mL/hr Rate Change      1630 0.04 mcg/kg/min - 4.94 mL/hr Rate Change      1645 0.05 mcg/kg/min - 6.17 mL/hr Rate Change      1732 0.06 mcg/kg/min - 7.4 mL/hr Rate Change      2125 0.06 mcg/kg/min - 7.4 mL/hr Rate Verify     05/05/24  0545 0.06 mcg/kg/min - 7.4 mL/hr Rate Verify      0700 0.06 mcg/kg/min - 7.4 mL/hr Rate Verify      0800 0.06 mcg/kg/min - 7.4 mL/hr Rate Verify      0900 0.05 mcg/kg/min - 6.17 mL/hr Rate Change      1030 0.04 mcg/kg/min - 4.94 mL/hr Rate Change      1200 0.03 mcg/kg/min - 3.7 mL/hr Rate Change      1230 0.02 mcg/kg/min - 2.47 mL/hr Rate Change      1330 0.01 mcg/kg/min - 1.23 mL/hr Rate Change      1352  Stopped               norepinephrine in dextrose 5 % (Levophed) infusion  - Omnicell Override Pull (mcg/kg/min) Total dose:  Cannot be calculated*   *Total user-documented volume 290.2 mL may contain volume from other administrations     Date/Time Rate/Dose/Volume Action       05/04/24  1103 0.01 mcg/kg/min - 1.23 mL/hr New Bag               dextrose 5 % and lactated Ringer's infusion (mL/hr) Total volume:  2,000 mL* Dosing weight:  65.8   *From  user-documented volume     Date/Time Rate/Dose/Volume Action       05/04/24  1624  Held Per Protocol      1730 100 mL/hr New Bag      2125 100 mL/hr - 391.67 mL Rate Verify     05/05/24  0305 100 mL/hr - 566.67 mL New Bag      0545 100 mL/hr - 266.67 mL Rate Verify      0700 100 mL/hr - 125 mL Rate Verify      1606 649.99 mL Stopped               pantoprazole (ProtoNix) injection 40 mg (mg) Total dose:  1,120 mg* Dosing weight:  65.8   *Administration not included in total     Date/Time Rate/Dose/Volume Action       05/04/24  1949 40 mg Given     05/05/24  0845 40 mg Given      2019 40 mg Given     05/06/24  0825 40 mg Given      2129 40 mg Given     05/07/24  0909 40 mg Given      2130 40 mg Given     05/08/24  0913 40 mg Given      2201 40 mg Given     05/09/24  1019 40 mg Given      2223 40 mg Given     05/10/24  0900 *40 mg Missed      2230 40 mg Given     05/11/24  0915 40 mg Given      2110 40 mg Given     05/12/24  0852 40 mg Given      2239 40 mg Given     05/13/24  0900 *40 mg Missed      2336 40 mg Given     05/14/24  0947 40 mg Given      2214 40 mg Given     05/15/24  1945 40 mg Given     05/16/24  0956 40 mg Given      2154 40 mg Given     05/17/24  0952 40 mg Given      2100 *40 mg Missed     05/18/24  0857 40 mg Given      2100 40 mg Given     05/19/24  0904 40 mg Given      2116 40 mg Given     05/20/24  0943 40 mg Given               potassium chloride 20 mEq in 100 mL IV premix (mL/hr) Total dose:  20 mEq* Dosing weight:  65.8   *From user-documented volume     Date/Time Rate/Dose/Volume Action       05/04/24 2020 20 mEq - 50 mL/hr (over 120 min) New Bag      2220 100 mL Stopped               potassium chloride IVPB  - Omnicell Override Pull (mL/hr) Total volume:  Not documented*   *Total volume has not been documented. View each administration to see the amount administered.     Date/Time Rate/Dose/Volume Action       05/04/24 2020 20 mEq - 50 mL/hr (over 120 min) New Bag      2220  (over 120  min) Stopped               insulin glargine (Lantus) injection 10 Units (Units) Total dose:  30 Units Dosing weight:  70.5      Date/Time Rate/Dose/Volume Action       05/05/24  0904 10 Units Given     05/06/24  0829 10 Units Given     05/07/24  0910 10 Units Given               insulin glargine (Lantus) injection 5 Units (Units) Total dose:  5 Units* Dosing weight:  70.5   *Administration not included in total     Date/Time Rate/Dose/Volume Action       05/08/24  0915 5 Units Given     05/09/24  0915 *5 Units Missed               insulin glargine (Lantus) injection 4 Units (Units) Total dose:  8 Units* Dosing weight:  70.5   *Administration not included in total     Date/Time Rate/Dose/Volume Action       05/09/24  2222 4 Units Given     05/10/24  2100 *4 Units Missed     05/11/24 2119 4 Units Given     05/12/24  2239 *4 Units Missed               insulin glargine (Lantus) injection 2 Units (Units) Total dose:  12 Units* Dosing weight:  70.5   *Administration not included in total     Date/Time Rate/Dose/Volume Action       05/13/24  2100 *2 Units Missed     05/14/24  2218 2 Units Given     05/15/24  2210 2 Units Given     05/16/24  2155 2 Units Given     05/17/24  2228 2 Units Given     05/18/24  2133 2 Units Given     05/19/24  2116 2 Units Given               epoetin tyson-epbx (Retacrit) injection 8,000 Units (Units) Total dose:  32,000 Units Dosing weight:  70.5      Date/Time Rate/Dose/Volume Action       05/06/24  1818 8,000 Units Given     05/08/24  1731 8,000 Units Given     05/10/24  1724 8,000 Units Given     05/15/24  1820 8,000 Units Given               cyclobenzaprine (Flexeril) tablet 5 mg (mg) Total dose:  65 mg Dosing weight:  70.5      Date/Time Rate/Dose/Volume Action       05/06/24  1455 5 mg Given     05/07/24  0019 5 mg Given      1425 5 mg Given      2130 5 mg Given     05/08/24  1102 5 mg Given     05/09/24  1026 5 mg Given      1409 5 mg Given      1725 5 mg Given     05/10/24  2231 5 mg  Given     05/14/24  0947 5 mg Given      2215 5 mg Given     05/15/24  1945 5 mg Given     05/16/24  2035 5 mg Given               HYDROmorphone (Dilaudid) injection 0.5 mg (mg) Total dose:  0.5 mg Dosing weight:  70.5      Date/Time Rate/Dose/Volume Action       05/08/24  1731 0.5 mg Given               sodium zirconium cyclosilicate (Lokelma) packet 10 g (g) Total dose:  20 g Dosing weight:  70.5      Date/Time Rate/Dose/Volume Action       05/12/24  1126 10 g Given      2239 10 g Given     05/13/24  0900 *10 g Missed     05/14/24  0919 *10 g Missed     05/15/24  0900 *10 g Missed     05/16/24  1001 *10 g Missed     05/17/24  0900 *10 g Missed     05/18/24  0900 *10 g Missed     05/19/24  0900 *10 g Missed     05/20/24  0900 *10 g Missed               lidocaine (Xylocaine) 20 mg/mL (2 %) injection (mL) Total volume:  5 mL      Date/Time Rate/Dose/Volume Action       05/13/24  1513 5 mL Given               sodium chloride 0.9% infusion (mL/hr) Total volume:  3,871.67 mL* Dosing weight:  70.5   *From user-documented volume     Date/Time Rate/Dose/Volume Action       05/15/24  0658 40 mL       1405 100 mL/hr New Bag      2200 791.67 mL      05/16/24  0229 100 mL/hr - 448.33 mL Rate Verify      0424 100 mL/hr New Bag     05/17/24  0420 100 mL/hr - 2,585 mL Rate Verify      0424 100 mL/hr - 6.67 mL Rate Verify     05/18/24  0405  Stopped               butamben-tetracaine-benzocaine (Cetacaine) spray 2 spray (spray) Total dose:  2 spray Dosing weight:  70.5      Date/Time Rate/Dose/Volume Action       05/14/24  1340 2 spray Given               lidocaine (Xylocaine) 2 % mouth solution 15 mL (mL) Total volume:  15 mL Dosing weight:  70.5      Date/Time Rate/Dose/Volume Action       05/14/24  1340 15 mL Given               ceftaroline (Teflaro) 200 mg in dextrose 5 % in water (D5W) 50 mL IV (mg) Total dose:  Cannot be calculated* Dosing weight:  74.9   *Cannot convert documented volume to the ordered unit     Date/Time  Rate/Dose/Volume Action       05/16/24  1219 200 mg (over 60 min) New Bag      1319  (over 60 min) Stopped      2036 200 mg (over 60 min) New Bag      2136 50 mL Stopped     05/17/24  0352 200 mg (over 60 min) New Bag      0452  (over 60 min) Stopped      1149 200 mg (over 60 min) New Bag      1249  (over 60 min) Stopped      2224 200 mg (over 60 min) New Bag      2324  (over 60 min) Stopped     05/18/24  0317 200 mg (over 60 min) New Bag      0417  (over 60 min) Stopped      1207 200 mg (over 60 min) New Bag      1307  (over 60 min) Stopped      1814 200 mg (over 60 min) New Bag      1914  (over 60 min) Stopped     05/19/24  0417 200 mg (over 60 min) New Bag      0517  (over 60 min) Stopped      1132 200 mg (over 60 min) New Bag      1232  (over 60 min) Stopped      1801 200 mg (over 60 min) New Bag      1901  (over 60 min) Stopped     05/20/24  0344 200 mg (over 60 min) New Bag      0444  (over 60 min) Stopped      1300 200 mg (over 60 min) New Bag      1400  (over 60 min) Stopped               DAPTOmycin (Cubicin) 700 mg/100 mL  mg (mL/hr) Total dose:  700 mg* Dosing weight:  74.9   *From user-documented volume     Date/Time Rate/Dose/Volume Action       05/16/24  1833 700 mg - 200 mL/hr (over 30 min) New Bag      1903 100 mL Stopped     05/18/24  1725 700 mg - 200 mL/hr (over 30 min) New Bag      1755  (over 30 min) Stopped                   No specimens collected      See detailed result report with images in PACS.    The patient tolerated the procedure well without incident or complication and is in stable condition.

## 2024-05-21 LAB
ALBUMIN SERPL BCP-MCNC: 2.3 G/DL (ref 3.4–5)
ANION GAP SERPL CALC-SCNC: 13 MMOL/L (ref 10–20)
BUN SERPL-MCNC: 14 MG/DL (ref 6–23)
CALCIUM SERPL-MCNC: 8.6 MG/DL (ref 8.6–10.3)
CHLORIDE SERPL-SCNC: 98 MMOL/L (ref 98–107)
CO2 SERPL-SCNC: 25 MMOL/L (ref 21–32)
CREAT SERPL-MCNC: 4.69 MG/DL (ref 0.5–1.3)
EGFRCR SERPLBLD CKD-EPI 2021: 15 ML/MIN/1.73M*2
ERYTHROCYTE [DISTWIDTH] IN BLOOD BY AUTOMATED COUNT: 17.5 % (ref 11.5–14.5)
GLUCOSE BLD MANUAL STRIP-MCNC: 101 MG/DL (ref 74–99)
GLUCOSE BLD MANUAL STRIP-MCNC: 107 MG/DL (ref 74–99)
GLUCOSE BLD MANUAL STRIP-MCNC: 127 MG/DL (ref 74–99)
GLUCOSE BLD MANUAL STRIP-MCNC: 82 MG/DL (ref 74–99)
GLUCOSE SERPL-MCNC: 102 MG/DL (ref 74–99)
HCT VFR BLD AUTO: 23.6 % (ref 41–52)
HGB BLD-MCNC: 7.2 G/DL (ref 13.5–17.5)
HOLD SPECIMEN: NORMAL
MCH RBC QN AUTO: 27.4 PG (ref 26–34)
MCHC RBC AUTO-ENTMCNC: 30.5 G/DL (ref 32–36)
MCV RBC AUTO: 90 FL (ref 80–100)
NRBC BLD-RTO: 0 /100 WBCS (ref 0–0)
PHOSPHATE SERPL-MCNC: 4.8 MG/DL (ref 2.5–4.9)
PLATELET # BLD AUTO: 151 X10*3/UL (ref 150–450)
POTASSIUM SERPL-SCNC: 4 MMOL/L (ref 3.5–5.3)
RBC # BLD AUTO: 2.63 X10*6/UL (ref 4.5–5.9)
SODIUM SERPL-SCNC: 132 MMOL/L (ref 136–145)
WBC # BLD AUTO: 5 X10*3/UL (ref 4.4–11.3)

## 2024-05-21 PROCEDURE — 87040 BLOOD CULTURE FOR BACTERIA: CPT | Mod: 91,AHULAB | Performed by: INTERNAL MEDICINE

## 2024-05-21 PROCEDURE — 85027 COMPLETE CBC AUTOMATED: CPT | Performed by: NURSE PRACTITIONER

## 2024-05-21 PROCEDURE — 2500000001 HC RX 250 WO HCPCS SELF ADMINISTERED DRUGS (ALT 637 FOR MEDICARE OP): Performed by: FAMILY MEDICINE

## 2024-05-21 PROCEDURE — 1200000002 HC GENERAL ROOM WITH TELEMETRY DAILY

## 2024-05-21 PROCEDURE — 2500000004 HC RX 250 GENERAL PHARMACY W/ HCPCS (ALT 636 FOR OP/ED): Performed by: INTERNAL MEDICINE

## 2024-05-21 PROCEDURE — 2500000004 HC RX 250 GENERAL PHARMACY W/ HCPCS (ALT 636 FOR OP/ED)

## 2024-05-21 PROCEDURE — 80069 RENAL FUNCTION PANEL: CPT | Performed by: NURSE PRACTITIONER

## 2024-05-21 PROCEDURE — 2500000002 HC RX 250 W HCPCS SELF ADMINISTERED DRUGS (ALT 637 FOR MEDICARE OP, ALT 636 FOR OP/ED): Performed by: INTERNAL MEDICINE

## 2024-05-21 PROCEDURE — 82947 ASSAY GLUCOSE BLOOD QUANT: CPT

## 2024-05-21 PROCEDURE — C9113 INJ PANTOPRAZOLE SODIUM, VIA: HCPCS

## 2024-05-21 PROCEDURE — 2500000001 HC RX 250 WO HCPCS SELF ADMINISTERED DRUGS (ALT 637 FOR MEDICARE OP): Performed by: NURSE PRACTITIONER

## 2024-05-21 PROCEDURE — 2500000006 HC RX 250 W HCPCS SELF ADMINISTERED DRUGS (ALT 637 FOR ALL PAYERS): Performed by: NURSE PRACTITIONER

## 2024-05-21 PROCEDURE — 36415 COLL VENOUS BLD VENIPUNCTURE: CPT | Performed by: NURSE PRACTITIONER

## 2024-05-21 PROCEDURE — 36415 COLL VENOUS BLD VENIPUNCTURE: CPT | Performed by: INTERNAL MEDICINE

## 2024-05-21 RX ADMIN — PANTOPRAZOLE SODIUM 40 MG: 40 INJECTION, POWDER, FOR SOLUTION INTRAVENOUS at 09:06

## 2024-05-21 RX ADMIN — PANTOPRAZOLE SODIUM 40 MG: 40 INJECTION, POWDER, FOR SOLUTION INTRAVENOUS at 21:24

## 2024-05-21 RX ADMIN — CYCLOBENZAPRINE HYDROCHLORIDE 5 MG: 5 TABLET, FILM COATED ORAL at 23:58

## 2024-05-21 RX ADMIN — LEVOTHYROXINE SODIUM 125 MCG: 125 TABLET ORAL at 06:51

## 2024-05-21 RX ADMIN — CEFTAROLINE FOSAMIL 200 MG: 600 POWDER, FOR SOLUTION INTRAVENOUS at 04:57

## 2024-05-21 RX ADMIN — NEPHROCAP 1 CAPSULE: 1 CAP ORAL at 09:05

## 2024-05-21 RX ADMIN — CEFTAROLINE FOSAMIL 200 MG: 600 POWDER, FOR SOLUTION INTRAVENOUS at 19:25

## 2024-05-21 RX ADMIN — CEFTAROLINE FOSAMIL 200 MG: 600 POWDER, FOR SOLUTION INTRAVENOUS at 12:03

## 2024-05-21 RX ADMIN — INSULIN GLARGINE 2 UNITS: 100 INJECTION, SOLUTION SUBCUTANEOUS at 21:24

## 2024-05-21 RX ADMIN — SEVELAMER CARBONATE 2400 MG: 800 TABLET, FILM COATED ORAL at 12:55

## 2024-05-21 ASSESSMENT — COGNITIVE AND FUNCTIONAL STATUS - GENERAL
DAILY ACTIVITIY SCORE: 16
WALKING IN HOSPITAL ROOM: TOTAL
DAILY ACTIVITIY SCORE: 15
CLIMB 3 TO 5 STEPS WITH RAILING: TOTAL
HELP NEEDED FOR BATHING: A LOT
DRESSING REGULAR LOWER BODY CLOTHING: A LOT
MOVING FROM LYING ON BACK TO SITTING ON SIDE OF FLAT BED WITH BEDRAILS: A LITTLE
TURNING FROM BACK TO SIDE WHILE IN FLAT BAD: A LITTLE
CLIMB 3 TO 5 STEPS WITH RAILING: TOTAL
MOVING FROM LYING ON BACK TO SITTING ON SIDE OF FLAT BED WITH BEDRAILS: A LITTLE
DRESSING REGULAR UPPER BODY CLOTHING: A LOT
TOILETING: A LOT
MOBILITY SCORE: 10
MOVING TO AND FROM BED TO CHAIR: A LOT
TOILETING: A LOT
DRESSING REGULAR LOWER BODY CLOTHING: A LOT
PERSONAL GROOMING: A LITTLE
STANDING UP FROM CHAIR USING ARMS: TOTAL
HELP NEEDED FOR BATHING: A LOT
WALKING IN HOSPITAL ROOM: TOTAL
TURNING FROM BACK TO SIDE WHILE IN FLAT BAD: A LITTLE
PERSONAL GROOMING: A LITTLE
MOVING TO AND FROM BED TO CHAIR: TOTAL
STANDING UP FROM CHAIR USING ARMS: TOTAL
DRESSING REGULAR UPPER BODY CLOTHING: A LITTLE
MOBILITY SCORE: 11

## 2024-05-21 ASSESSMENT — PAIN SCALES - GENERAL: PAINLEVEL_OUTOF10: 3

## 2024-05-21 ASSESSMENT — PAIN - FUNCTIONAL ASSESSMENT: PAIN_FUNCTIONAL_ASSESSMENT: 0-10

## 2024-05-21 NOTE — NURSING NOTE
[id number]- attempted to call pt mother back for update. Tried twice with no answer. Left voicemail with callback number.

## 2024-05-21 NOTE — CARE PLAN
The patient's goals for the shift include      The clinical goals for the shift include patient will remain safe and free from falls/injury this shift      Problem: Pain  Goal: My pain/discomfort is manageable  Outcome: Progressing     Problem: Safety  Goal: Patient will be injury free during hospitalization  Outcome: Progressing  Goal: I will remain free of falls  Outcome: Progressing     Problem: Daily Care  Goal: Daily care needs are met  Outcome: Progressing     Problem: Psychosocial Needs  Goal: Demonstrates ability to cope with hospitalization/illness  Outcome: Progressing  Goal: Collaborate with me, my family, and caregiver to identify my specific goals  Outcome: Progressing     Problem: Discharge Barriers  Goal: My discharge needs are met  Outcome: Progressing     Problem: Skin  Goal: Decreased wound size/increased tissue granulation at next dressing change  Outcome: Progressing  Goal: Participates in plan/prevention/treatment measures  Outcome: Progressing  Goal: Prevent/manage excess moisture  Outcome: Progressing  Goal: Prevent/minimize sheer/friction injuries  Outcome: Progressing  Goal: Promote/optimize nutrition  Outcome: Progressing  Goal: Promote skin healing  Outcome: Progressing     Problem: Fall/Injury  Goal: Not fall by end of shift  Outcome: Progressing  Goal: Be free from injury by end of the shift  Outcome: Progressing  Goal: Verbalize understanding of personal risk factors for fall in the hospital  Outcome: Progressing  Goal: Verbalize understanding of risk factor reduction measures to prevent injury from fall in the home  Outcome: Progressing  Goal: Use assistive devices by end of the shift  Outcome: Progressing  Goal: Pace activities to prevent fatigue by end of the shift  Outcome: Progressing     Problem: Pain - Adult  Goal: Verbalizes/displays adequate comfort level or baseline comfort level  Outcome: Progressing     Problem: Safety - Adult  Goal: Free from fall injury  Outcome:  Progressing     Problem: Discharge Planning  Goal: Discharge to home or other facility with appropriate resources  Outcome: Progressing     Problem: Chronic Conditions and Co-morbidities  Goal: Patient's chronic conditions and co-morbidity symptoms are monitored and maintained or improved  Outcome: Progressing     Problem: Diabetes  Goal: Achieve decreasing blood glucose levels by end of shift  Outcome: Progressing  Goal: Increase stability of blood glucose readings by end of shift  Outcome: Progressing  Goal: Decrease in ketones present in urine by end of shift  Outcome: Progressing  Goal: Maintain electrolyte levels within acceptable range throughout shift  Outcome: Progressing  Goal: Maintain glucose levels >70mg/dl to <250mg/dl throughout shift  Outcome: Progressing  Goal: No changes in neurological exam by end of shift  Outcome: Progressing  Goal: Learn about and adhere to nutrition recommendations by end of shift  Outcome: Progressing  Goal: Vital signs within normal range for age by end of shift  Outcome: Progressing  Goal: Increase self care and/or family involovement by end of shift  Outcome: Progressing  Goal: Receive DSME education by end of shift  Outcome: Progressing

## 2024-05-21 NOTE — PROGRESS NOTES
"Nutrition Follow-up Note  Nutrition Assessment         History:  Food and Nutrient History  Energy Intake: Fair 50-75 %  Food and Nutrient History: Patient asleep.  Staff reports he is eating 50-75% of meals.    Anthropometrics:  Height: 175.3 cm (5' 9.02\")  Weight: 70.3 kg (155 lb)  BMI (Calculated): 22.88  2.6 kg decrease over 1 week.  Weight Change: 3.82    Weight Change  Weight History / % Weight Change: 11/10/23: 79.4kg.  23: 92 kg.  21% loss over 8 months.  Some may be related to HD treatments.  Significant Weight Loss: Yes  IBW/kg (Dietitian Calculated): 72.7 kg   Amputation Calculations:  BMI Amputation Adjustment: Yes  Percent Amputation: Upper arm, Thigh  Total Amputation Percentage: 12.8  Adjusted IBW/k.7  Adjusted BMI: 27.2    Energy Needs:  Calculated Energy Needs Using Equations  Height: 175.3 cm (5' 9.02\")  Temp: 36.9 °C (98.4 °F)    Estimated Energy Needs  Method for Estimating Needs: 1399-4653 @ 30-35 kcal/kg IBW    Estimated Protein Needs  Method for Estimating Needs:  @ 1.5-1.7 gr/kg IBW    Estimated Fluid Needs  Method for Estimating Needs: per MD     Dietary Orders   Adult diet Regular, Carb Controlled; 75 gram carb/meal, 45 gram Carb evening snack     Results   POCT GLUCOSE  Result Value Ref Range   POCT Glucose 116 (H) 74 - 99 mg/dL  POCT GLUCOSE  Result Value Ref Range   POCT Glucose 82 74 - 99 mg/dL  CBC  Result Value Ref Range   WBC 5.0 4.4 - 11.3 x10*3/uL   nRBC 0.0 0.0 - 0.0 /100 WBCs   RBC 2.63 (L) 4.50 - 5.90 x10*6/uL   Hemoglobin 7.2 (L) 13.5 - 17.5 g/dL   Hematocrit 23.6 (L) 41.0 - 52.0 %   MCV 90 80 - 100 fL   MCH 27.4 26.0 - 34.0 pg   MCHC 30.5 (L) 32.0 - 36.0 g/dL   RDW 17.5 (H) 11.5 - 14.5 %   Platelets 151 150 - 450 x10*3/uL  Renal function panel  Result Value Ref Range   Glucose 102 (H) 74 - 99 mg/dL   Sodium 132 (L) 136 - 145 mmol/L   Potassium 4.0 3.5 - 5.3 mmol/L   Chloride 98 98 - 107 mmol/L   Bicarbonate 25 21 - 32 mmol/L   Anion Gap 13 10 - 20 mmol/L   Urea " Nitrogen 14 6 - 23 mg/dL   Creatinine 4.69 (H) 0.50 - 1.30 mg/dL   eGFR 15 (L) >60 mL/min/1.73m*2   Calcium 8.6 8.6 - 10.3 mg/dL   Phosphorus 4.8 2.5 - 4.9 mg/dL   Albumin 2.3 (L) 3.4 - 5.0 g/dL  POCT GLUCOSE  Result Value Ref Range   POCT Glucose 101 (H) 74 - 99 mg/dL  POCT GLUCOSE  Result Value Ref Range   POCT Glucose 107 (H) 74 - 99 mg/dL     Nutrition Diagnosis   Malnutrition Diagnosis  Patient has Malnutrition Diagnosis: Yes  Diagnosis Status: New  Malnutrition Diagnosis: Severe malnutrition related to chronic disease or condition  As Evidenced by: 18% weight loss over 8 months, intake <75% meals for > 1 month.    Patient has Nutrition Diagnosis: Yes  Nutrition Diagnosis 1: Inadequate protein energy intake  Diagnosis Status (1): New  Related to (1): increased energy demands  As Evidenced by (1): intake <50% of meals.     Nutrition Interventions/Recommendations   Nutrition Prescription  Individualized Nutrition Prescription Provided for : Continue diet as ordered, moniotr RFP and add mineral restrictions as appropriate. Patient defers nutrition supplements at this time. Daily renal MVI.    Food and/or Nutrient Delivery Interventions  Interventions: Meals and snacks  Goal: intake meets >75% estimated nutrient needs.     Nutrition Monitoring and Evaluation   Food and Nutrient Related History   Amount of Food: Estimated amout of food  Criteria: intake >75% of meals    Anthropometrics: Body Composition/Growth/Weight History  Weight: Measured weight  Criteria: s/p dialysis  Weight Change: Weight change percentage  Criteria: weekly    Biochemical Data, Medical Tests and Procedures  Electrolyte and Renal Panel: BUN, Calcium, serum, Chloride, Creatinine, Magnesium, Phosphorus, Potassium, Sodium  Criteria: as indicated  Glucose/Endocrine Profile: Glucose, casual, Hemoglobin A1c (HgbA1c)  Criteria: as indicated  Nutritional Anemia Profile: Folate, serum, Hematocrit, Hemoglobin, Iron, serum  Criteria: as  indicated  Vitamin Profile: Vitamin D, 25 hydroxy, Other (Comment)  Criteria: as indicated    Nutrition Focused Physical Findings   Digestive System: Anorexia, Constipation, Diarrhea, Early satiety, Nausea, Vomiting  Criteria: daily    Follow Up  Last Date of Nutrition Visit: 05/21/24  Nutrition Follow-Up Needed?: Dietitian to reassess per policy  Follow up Comment: fair PO-TR

## 2024-05-21 NOTE — PROGRESS NOTES
Edwar Hemphill is a 39 y.o. male on day 21 of admission presenting with SIRS (systemic inflammatory response syndrome) (Multi).    Subjective   Mr. Hemphill is a 39-year-old man with a history of ESRD on hemodialysis.  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  He has had many bloodstream infections related to catheters, again during this admission.  He has MRSA, permcath was removed but he required a temporary femoral hemodialysis catheter due to persistent bacteremia.  TTE without endocarditis, then had a POLI as well on May 14.  CT was without an epidural abscess, MRI was done that was also negative for infection.    We dialyzed him yesterday followed by temporary catheter removal.        Objective       Physical Exam  Constitutional:       Appearance: Normal appearance.   HENT:      Mouth/Throat:      Mouth: Mucous membranes are moist.   Eyes:      Extraocular Movements: Extraocular movements intact.      Pupils: Pupils are equal, round, and reactive to light.   Cardiovascular:      Rate and Rhythm: Regular rhythm.      Heart sounds: S1 normal and S2 normal.   Pulmonary:      Breath sounds: Poor effort, no crackles  Abdominal:      Comments: Soft, NT/ND, no masses, normal bowel sounds   Genitourinary:     Comments: No dodd  Musculoskeletal:      Right lower leg: No edema.      Left lower leg: No edema.   Right upper extremity amputation  Skin:     General: Skin is warm and dry.   Neurological:      General: No focal deficit present.      Mental Status: She is alert and oriented to person, place, and time.   Psychiatric:         Behavior: Behavior normal.        Meds    Current Facility-Administered Medications:     [Held by provider] apixaban (Eliquis) tablet 5 mg, 5 mg, oral, BID, OSBALDO Wilson DNP, 5 mg at 05/03/24 7279    B complex-vitamin C-folic acid (Nephrocaps) capsule 1 capsule, 1 capsule, oral, Daily, OSBALDO Wilson DNP, 1 capsule at 05/21/24  0905    ceftaroline (Teflaro) 200 mg in dextrose 5 % in water (D5W) 50 mL IV, 200 mg, intravenous, q8h, Sheldon Saleh MD, Stopped at 05/21/24 1303    cyclobenzaprine (Flexeril) tablet 5 mg, 5 mg, oral, TID PRN, El Estrada MD, 5 mg at 05/20/24 2219    DAPTOmycin (Cubicin) 700 mg/100 mL  mg, 700 mg, intravenous, q48h, Sheldon Saleh MD, Stopped at 05/20/24 1914    dextrose 50 % injection 12.5 g, 12.5 g, intravenous, q15 min PRN, Veronica De La Paz MD, 12.5 g at 05/20/24 1219    dextrose 50 % injection 25 g, 25 g, intravenous, q15 min PRN, Mike Santana MD    epoetin tyson-epbx (Retacrit) injection 8,000 Units, 100 Units/kg, subcutaneous, Every Mon/Wed/Fri, Eber Bruce DO, 8,000 Units at 05/20/24 1843    fentaNYL PF (Sublimaze) injection, , , PRN, Ronaldo Dempsey MD, 25 mcg at 05/13/24 1513    glucagon (Glucagen) injection 1 mg, 1 mg, intramuscular, q15 min PRN, Veronica De La Paz MD    heparin 1,000 unit/mL injection 3,100 Units, 3,100 Units, intra-catheter, After Dialysis, OSBALDO Wilson, DNP, 3,100 Units at 05/20/24 1223    heparin 1,000 unit/mL injection 3,100 Units, 3,100 Units, intra-catheter, After Dialysis, OSBALDO Wilson, DNP, 3,100 Units at 05/20/24 1223    heparin 1,000 unit/mL injection, , , PRN, Ronaldo Dempsey MD, 1,800 Units at 05/13/24 2329    insulin glargine (Lantus) injection 2 Units, 2 Units, subcutaneous, Nightly, Mike Santana MD, 2 Units at 05/20/24 2219    insulin lispro (HumaLOG) injection 0-5 Units, 0-5 Units, subcutaneous, TID, Veronica De La Paz MD, 1 Units at 05/16/24 0956    levothyroxine (Synthroid, Levoxyl) tablet 125 mcg, 125 mcg, oral, Daily before breakfast, Andi Bazan, APRN-CNP, DNP, 125 mcg at 05/21/24 0651    lidocaine (Xylocaine) 20 mg/mL (2 %) injection, , , PRN, Ronaldo Dempsey MD, 5 mL at 05/13/24 1513    lidocaine PF (Xylocaine) 10 mg/mL (1 %) injection, , , PRN, Ronaldo Dempsey MD, 5 mL at 05/03/24 4635     loperamide (Imodium) capsule 2 mg, 2 mg, oral, q4h PRN, OSBALDO Wilson, DNP    midazolam (Versed) injection, , , PRN, Ronaldo Dempsey MD, 0.5 mg at 05/13/24 1513    nitroglycerin (Nitrostat) SL tablet 0.4 mg, 0.4 mg, sublingual, q5 min PRN, OSBALDO Wilson, DNP    ondansetron (Zofran) injection 4 mg, 4 mg, intravenous, q6h PRN, OSBALDO Wilson, DNP    oxyCODONE (Roxicodone) immediate release tablet 5 mg, 5 mg, oral, q4h PRN, OSBALDO Wilson, DNP, 5 mg at 05/20/24 2219    oxygen (O2) therapy, , , Continuous PRN, Ronaldo Dempsey MD, Last Rate: 120,000 mL/hr at 05/03/24 1758, 2 L/min at 05/03/24 1758    oxygen (O2) therapy, , inhalation, Continuous PRN - O2/gases, OSBALDO Rousseau    oxygen (O2) therapy, , inhalation, Continuous PRN - O2/gases, OSBALDO Rousseau    pantoprazole (ProtoNix) injection 40 mg, 40 mg, intravenous, BID, Shannan Schwartz, OSBALDO, 40 mg at 05/21/24 0906    polyethylene glycol (Glycolax, Miralax) packet 17 g, 17 g, oral, Daily, OSBALDO Wilson, DNP, 17 g at 05/04/24 0624    sennosides (Senokot) tablet 8.6 mg, 8.6 mg, oral, BID, OSBALDO Wilson, DNP, 8.6 mg at 05/20/24 0942    sevelamer carbonate (Renvela) tablet 2,400 mg, 2,400 mg, oral, TID, AMARILYS Wilson-CNP, DNP, 2,400 mg at 05/21/24 1255    simethicone (Mylicon) chewable tablet 80 mg, 80 mg, oral, q8h PRN, Andi Bazan, APRN-CNP, DNP    sodium zirconium cyclosilicate (Lokelma) packet 10 g, 10 g, oral, Daily, Eber Bruce DO   Medications Discontinued During This Encounter   Medication Reason    loperamide (Imodium A-D) 2 mg tablet Med List Cleanup    piperacillin-tazobactam-dextrose (Zosyn) IV 2.25 g     piperacillin-tazobactam-dextrose (Zosyn) IV 2.25 g     heparin 1,000 unit/mL injection 3,100 Units     heparin 1,000 unit/mL injection 3,100 Units     glucagon (Glucagen) injection 1 mg     dextrose 50 %  "injection 25 g     glucagon (Glucagen) injection 1 mg     dextrose 50 % injection 12.5 g     insulin lispro (HumaLOG) injection 0-5 Units     melatonin tablet 3 mg     insulin regular 100 unit/100 mL (1 unit/mL) in 0.9 % NaCl infusion     insulin regular (HumuLIN, NovoLIN) bolus from bag 2-6 Units     dextrose 5 % and lactated Ringer's infusion     vancomycin (Vancocin) pharmacy to dose - pharmacy monitoring Duplicate order    epoetin tyson-epbx (Retacrit) injection 8,000 Units Availability    insulin glargine (Lantus) injection 10 Units     epoetin tyson-epbx (Retacrit) injection 8,000 Units     norepinephrine (Levophed) 8 mg in dextrose 5% 250 mL (0.032 mg/mL) infusion (premix)     insulin glargine (Lantus) injection 5 Units     oxygen (O2) therapy     insulin glargine (Lantus) injection 4 Units     atorvastatin (Lipitor) tablet 40 mg     DAPTOmycin (Cubicin) 450 mg in sodium chloride 0.9% 50 mL IV     vancomycin (Vancocin) pharmacy to dose - pharmacy monitoring     sodium chloride 0.9% infusion         Allergies  Allergies   Allergen Reactions    Cashew Nut Anaphylaxis and Swelling     cashews        Last Recorded Vitals  Blood pressure 135/83, pulse 86, temperature 36.9 °C (98.4 °F), temperature source Oral, resp. rate 18, height 1.753 m (5' 9.02\"), weight 70.3 kg (155 lb), SpO2 96%.  Intake/Output last 3 Shifts:  I/O last 3 completed shifts:  In: 1200 (17.1 mL/kg) [I.V.:800 (11.4 mL/kg); Other:400]  Out: 1400 (19.9 mL/kg) [Other:1400]  Weight: 70.3 kg     Last 24 hour Results  Results for orders placed or performed during the hospital encounter of 04/30/24 (from the past 24 hour(s))   POCT GLUCOSE   Result Value Ref Range    POCT Glucose 94 74 - 99 mg/dL   POCT GLUCOSE   Result Value Ref Range    POCT Glucose 116 (H) 74 - 99 mg/dL   POCT GLUCOSE   Result Value Ref Range    POCT Glucose 82 74 - 99 mg/dL   SST TOP   Result Value Ref Range    Extra Tube Hold for add-ons.    Blood Culture    Specimen: Peripheral " Venipuncture; Blood culture   Result Value Ref Range    Blood Culture Loaded on Instrument - Culture in progress    Blood Culture    Specimen: Peripheral Venipuncture; Blood culture   Result Value Ref Range    Blood Culture Loaded on Instrument - Culture in progress    CBC   Result Value Ref Range    WBC 5.0 4.4 - 11.3 x10*3/uL    nRBC 0.0 0.0 - 0.0 /100 WBCs    RBC 2.63 (L) 4.50 - 5.90 x10*6/uL    Hemoglobin 7.2 (L) 13.5 - 17.5 g/dL    Hematocrit 23.6 (L) 41.0 - 52.0 %    MCV 90 80 - 100 fL    MCH 27.4 26.0 - 34.0 pg    MCHC 30.5 (L) 32.0 - 36.0 g/dL    RDW 17.5 (H) 11.5 - 14.5 %    Platelets 151 150 - 450 x10*3/uL   Renal function panel   Result Value Ref Range    Glucose 102 (H) 74 - 99 mg/dL    Sodium 132 (L) 136 - 145 mmol/L    Potassium 4.0 3.5 - 5.3 mmol/L    Chloride 98 98 - 107 mmol/L    Bicarbonate 25 21 - 32 mmol/L    Anion Gap 13 10 - 20 mmol/L    Urea Nitrogen 14 6 - 23 mg/dL    Creatinine 4.69 (H) 0.50 - 1.30 mg/dL    eGFR 15 (L) >60 mL/min/1.73m*2    Calcium 8.6 8.6 - 10.3 mg/dL    Phosphorus 4.8 2.5 - 4.9 mg/dL    Albumin 2.3 (L) 3.4 - 5.0 g/dL   POCT GLUCOSE   Result Value Ref Range    POCT Glucose 101 (H) 74 - 99 mg/dL   POCT GLUCOSE   Result Value Ref Range    POCT Glucose 107 (H) 74 - 99 mg/dL        Imaging results  Electrocardiogram, 12-lead PRN ACS symptoms    Result Date: 5/17/2024  Atrial fibrillation with rapid ventricular response with premature ventricular or aberrantly conducted complexes Nonspecific ST and T wave abnormality , probably digitalis effect Abnormal ECG When compared with ECG of 16-JAN-2024 07:06, Atrial fibrillation has replaced Sinus rhythm ST elevation now present in Inferior leads Nonspecific T wave abnormality, worse in Lateral leads    IR CVC removal    Result Date: 5/16/2024  These images are not reportable by radiology and will not be interpreted by  Radiologists.    Transesophageal Echo (POLI)    Result Date: 5/14/2024   [facility], [address] [place] [address],  [place] [address]              Tel [phone] and Fax [phone] TRANSESOPHAGEAL ECHOCARDIOGRAM REPORT  Patient Name:      XIOMARA CASSANDRA Maya Physician:    [id number] Edward Hernandez DO Study Date:        5/14/2024           Ordering Provider:    [id number] ALKA VIEYRA MRN/PID:           [id number]            Fellow: Accession#:        [id number]        Nurse:                Ronaldo Woo RN Date of Birth/Age: 1985 / 39      Sonographer:          Alejandro Carroll RDCS                    years Gender:            M                   Additional Staff:     Kayden Matthews CRNA Height:            175.26 cm           Admit Date:           4/30/2024 Weight:            72.58 kg            Admission Status:     Inpatient -                                                              Routine BSA / BMI:         1.88 m2 / 23.63     Encounter#:           [id number]                    kg/m2                                        Department Location:  [facility] Non                                                              Invasive Blood Pressure: 116 /69 mmHg Study Type:    TRANSESOPHAGEAL ECHO (POLI) Diagnosis/ICD: Bacteremia-R78.81 Indication:    Staphyococcus aureus bacteremia, R/o endocarditis CPT Code:      POLI Complete-95436; Doppler Limited-83062; Color Doppler-91884 Patient History: Allergies:         Cashew nuts. Smoker:            Unknown. Diabetes:          Yes Insulin Pertinent History: HTN, Hyperlipidemia and PVD. 39 y.o. male presents for POLI                    for endocarditis rule out.                     PMH of ESRD and SIRS. Study Detail: The following Echo studies were performed: 2D. Agitated saline               used as a contrast agent for intraseptal flow evaluation. Total               contrast used for this procedure was 10 mL via IV push. Patient's               heart  rhythm is sinus tachycardia. The patient was sedated.  PHYSICIAN INTERPRETATION: POLI Details: The POLI probe used was 5177. Technically adequate omniplane transesophageal echocardiogram performed. POLI Medication: The pharynx was anesthetized with Cetacaine spray and viscous lidocaine. The patient was sedated by Anesthesia; please refer to anesthesia flow sheet for medications used. POLI Procedure: The probe was passed without difficulty. Left Ventricle: The left ventricular systolic function is mildly decreased, with an estimated ejection fraction of 35-40%. Wall motion is abnormal. The left ventricular cavity size was not assessed. Left ventricular diastolic filling was not assessed. Left Atrium: The left atrium is normal in size. There is no definite left atrial thrombus present. A bubble study using agitated saline was performed. Bubble study is negative. There is a normal sized left atrial appendage. Right Ventricle: The right ventricle is normal in size. There is normal right ventricular global systolic function. Right Atrium: The right atrium is normal in size. Aortic Valve: There is no visualized aortic valve vegetation. The aortic valve appears structurally normal. There is no evidence of aortic valve regurgitation. Mitral Valve: The mitral valve is normal in structure. There was no mitral valve vegetation visualized. There is trace mitral valve regurgitation. Tricuspid Valve: No vegetation is seen on the tricuspid valve. The tricuspid valve is structurally normal. There is trace tricuspid regurgitation. Pulmonic Valve: No pulmonic valve vegetation visualized. The pulmonic valve is structurally normal. There is no indication of pulmonic valve regurgitation. Pericardium: There is no pericardial effusion noted. Aorta: The aortic root is normal.  CONCLUSIONS:  1. Left ventricular systolic function is mildly decreased with a 35-40% estimated ejection fraction.  2. No mitral valve vegetation visualized.  3. No  tricuspid valve vegetation.  4. No aortic valve vegetation visualized.  5. No pulmonic valve vegetation visualized.  6. No left atrial thrombus. QUANTITATIVE DATA SUMMARY:  [id number] Edward Hernandez DO Electronically signed on 5/14/2024 at 7:06:59 PM  ** Final **     IR CVC tunneled    Result Date: 5/13/2024  Interpreted By:  Ronaldo Dempsey, STUDY: IR CVC TUNNELED; ;  5/13/2024 3:26 pm   ULTRASOUND FLUOROSCOPICALLY GUIDED RIGHT GROIN TEMPORARY HEMODIALYSIS CATHETER PLACEMENT   INDICATION: Signs/Symptoms:Temporary dialysis line today. 39-year-old man with end-stage renal disease, persistent bacteremia despite removal of groin tunneled hemodialysis catheter. Requires replacement of hemodialysis access.   COMPARISON: Fluoroscopic images from prior tunneled hemodialysis catheter exchange 05/13/2024   ACCESSION NUMBER(S): [id number]   ORDERING CLINICIAN: BRADEN QUIROGA   TECHNIQUE:   ATTENDING : Ronaldo Dempsey M.D.   TECHNICAL DESCRIPTION/FINDINGS: The procedure, including all risks, benefits and alternatives were explained to the patient in detail. All questions were answered and written informed consent was obtained.   Patient was positioned supine on the fluoroscopy table. A time-out was performed.   The bilateral groins were prepped and draped in usual sterile fashion. Focused ultrasound was performed over the superior aspect of the right common femoral vein demonstrating superior patency with partial compressibility. Ultrasound images were saved. 1% lidocaine was administered subcutaneously for local anesthesia. Under real-time ultrasound guidance, the right common femoral vein was accessed in antegrade direction using micropuncture technique. Ultrasound images were saved. Under fluoroscopic visualization, an 018 guidewire was advanced into the right common iliac vein and micropuncture transitional introducer was utilized for placement of an 035 guidewire into the IVC. After serial dilation, a 13 Prydeinig  by 20 cm Trialysis catheter was then placed. A completion fluoroscopic image was obtained demonstrating the tip of the temporary hemodialysis catheter projecting over the IV C.   Catheter lumens easily aspirated and flushed. Large-bore dialysis lumens were locked with a concentrated heparinized solution (1000 units/cc). A small bore 3rd lumen was locked with a dilute heparinized solution. The catheter hub was sutured to the skin with 2 0 Prolene stay suture. Sterile dressing was applied.   SEDATION/MEDICATIONS: Continuous cardiopulmonary monitoring was performed by a radiology nurse for the duration of the procedure. 0.5 mg Versed and   25 mcg Fentanyl were administered for moderate conscious sedation time of 20 minutes.      10 cc 1% Lidocaine was administered subcutaneously for local anesthesia. SPECIMENS: None. ESTIMATED BLOOD LOSS:  5 cc FLOUROSCOPY:  0.1 minutes; DAP  2405 mGy-cm*2; Air Kerma  6.58 mGy CONTRAST: None.   FINDINGS: Test       Ultrasound fluoroscopically guided right groin temporary hemodialysis catheter. The catheter is ready for immediate use.     MACRO: None   Signed by: Ronaldo Dempsey 5/13/2024 4:59 PM Dictation workstation:   [id number]    MR lumbar spine wo IV contrast    Result Date: 5/4/2024  Interpreted By:  Eva Og,  tere Lowe David STUDY: MR LUMBAR SPINE WO IV CONTRAST   INDICATION: Signs/Symptoms:back pain new with bacteremia, possible infection   COMPARISON: CT of the lumbar spine 04/30/2024. Body CT 01/14/2024.   ACCESSION NUMBER(S): [id number]   ORDERING CLINICIAN: ALKA VIEYRA   TECHNIQUE: Sagittal T1, sagittal T2, sagittal STIR, axial T1 and axial T2 weighted MRI images of the lumbar spine were obtained without intravenous contrast administration.   FINDINGS: Image quality is somewhat degraded due to motion and technique.   There are 5 non-rib-bearing lumbar-type vertebral bodies. The last well-formed intervertebral disc is labeled L5-S1.   Minimal  retrolisthesis at L5-S1. Alignment is otherwise normal.   Vertebral body heights are maintained. Disc spaces are relatively maintained.   There is diffusely hypointense bone marrow signal on the T1 weighted images which may be related to renal osteodystrophy. Differential considerations include anemia, reactive marrow or bone marrow infiltrating process.   There is no increased STIR signal to suggest marrow edema. Multilevel degenerative endplate changes include prominent Schmorl's nodes most pronounced at L3-4 where there are Modic type 2 changes and central intradiscal T1 and T2 hypointense signal likely degenerative.   The conus medullaris terminates at L1-2. The visualized spinal cord, conus medullaris and cauda equina demonstrate normal signal and morphology.   Fatty atrophic changes involve the paravertebral musculature. Partially visualized kidneys demonstrate atrophic changes with multiple bilateral cysts, better characterized on previous body CT dated 01/14/2024. Partially visualized catheter within the inferior vena cava and right common iliac vein.   T10-11 on sagittal images only: No spinal canal or neural foraminal stenosis.   T11-12 on sagittal images only: No spinal canal or neural foraminal stenosis.   T12-L1: Facet arthropathy and ligamentum flavum thickening. No spinal canal or neural foraminal stenosis.   L1-2: Facet arthropathy and ligamentum flavum thickening. No spinal canal or neural foraminal stenosis.   L2-3: Facet arthropathy and ligamentum flavum thickening. No spinal canal or neural foraminal stenosis.   L3-4: Disc bulge, facet arthropathy and ligamentum flavum thickening. No spinal canal or neural foraminal stenosis.   L4-5: Disc bulge with small central protrusion, facet arthropathy and ligamentum flavum thickening. No spinal canal stenosis. Mild bilateral neural foraminal stenosis.   L5-S1: Disc bulge, facet arthropathy and ligamentum flavum thickening. No spinal canal stenosis. Mild  right and no left neural foraminal stenosis.   Degenerative changes are noted at the sacroiliac joints.       Diffusely hypointense bone marrow signal on the T1 weighted images may be related to renal osteodystrophy. Differential considerations include anemia, reactive bone marrow or bone marrow infiltrating process.   No definite imaging findings to suggest discitis osteomyelitis on the current exam.   Degenerative changes without significant spinal canal stenosis. Varying degrees of mild neural foraminal narrowing as detailed above.   I personally reviewed the images/study and I agree with the findings as stated by Dr. Lowe.   MACRO: None   Signed by: Eva Og 5/4/2024 11:11 AM Dictation workstation:   [id number]    IR CVC exchange    Result Date: 5/3/2024  Interpreted By:  Ronaldo Dempsey, STUDY: IR CVC EXCHANGE; ;  5/3/2024 6:28 pm   FLUOROSCOPICALLY GUIDED EXCHANGE OF RIGHT GROIN TUNNELED HEMODIALYSIS CATHETER   INDICATION: Signs/Symptoms:Bacteremia- Right fem TDC exchange. 39-year-old man with end-stage renal disease, central thoracic venous occlusion, end-stage vascular access with bacteremia. Guidewire exchange of right groin tunneled hemodialysis catheter in an attempt to salvage the access.   COMPARISON: Chest radiograph 04/30/2024, fluoroscopic images from prior tunneled hemodialysis catheter placement 01/22/2024   ACCESSION NUMBER(S): [id number]   ORDERING CLINICIAN: SACHI MORTENSEN   TECHNIQUE:   ATTENDING : Ronaldo Dempsey M.D.   TECHNICAL DESCRIPTION/FINDINGS: The procedure, including all risks, benefits and alternatives were explained to the patient in detail. All questions were answered and written informed consent was obtained.   The patient was positioned supine on the angiography table. A time-out was performed.   The right groin was prepped and draped in usual sterile fashion. A  fluoroscopic image was obtained demonstrating the tunneled hemodialysis catheter  terminating at the inferior cavoatrial junction.   1% lidocaine was administered via the subcutaneous tunnel tract for local anesthesia. Blunt dissection was performed to free the subcutaneous catheter cuff. An 035 stiff Glidewire was then advanced through the catheter into the right atrium. The old catheter was removed. Over the guidewire, a new 14.5 Mosotho by 42 cm tip to cuff dual-lumen hemodialysis catheter was then placed. A completion fluoroscopic image demonstrates the catheter in the inferior right atrium.   Catheter lumens easily aspirated and flushed and were locked with a concentrated heparinized solution (1000 units/cc). The catheter hub was then sutured to the skin with 2 0 Prolene stay suture. Because of losing along the tract, Gel-Foam pledgets were then administered subcutaneously near where the catheter enters the skin for additional hemostasis, followed by a pursestring suture. A sterile dressing was applied.   SEDATION/MEDICATIONS: Continuous cardiopulmonary monitoring was performed by a radiology nurse for the duration of the procedure.  1 mg Versed and   50 mcg Fentanyl were administered for moderate conscious sedation time of 20 minutes.      10 cc 1% Lidocaine was administered subcutaneously for local anesthesia. SPECIMENS: None. ESTIMATED BLOOD LOSS:  5 cc FLOUROSCOPY:  1.2 minutes; DAP  51580 mGy-cm*2; Air Kerma  59.07 mGy CONTRAST: None.   FINDINGS: Test       Fluoroscopically guidewire exchange of right groin tunneled hemodialysis catheter. The newly exchanged catheter is ready for immediate use.     MACRO: None   Signed by: Ronaldo Dempsey 5/3/2024 8:09 PM Dictation workstation:   [id number]    Transthoracic Echo Complete    Result Date: 5/3/2024   [facility], [address] [place] [address]              Tel [phone] and Fax [phone] TRANSTHORACIC ECHOCARDIOGRAM REPORT  Patient Name:      XIOMARA Maya Physician:    [id number] Nur                                                                DO Study Date:        5/3/2024             Ordering Provider:    [id number] ALKA VIEYRA MRN/PID:           [id number]             Fellow: Accession#:        [id number]         Nurse: Date of Birth/Age: 1985 / 39 years Sonographer:          Sy Lyons RDCS Gender:            M                    Additional Staff: Height:            175.00 cm            Admit Date: Weight:            65.80 kg             Admission Status:     Inpatient -                                                               Routine BSA / BMI:         1.80 m2 / 21.49      Encounter#:           [id number]                    kg/m2                                         Department Location:  Critical access hospital Non                                                               Invasive Blood Pressure: 112 /74 mmHg Study Type:    TRANSTHORACIC ECHO (TTE) COMPLETE Diagnosis/ICD: Endocarditis, valve unspecified-I38 Indication:    endocarditis CPT Code:      Echo Complete w Full [id number] Patient History: Pertinent History: HTN and Hyperlipidemia. Study Detail: The following Echo studies were performed: M-Mode, Doppler, color               flow and 2D.  PHYSICIAN INTERPRETATION: Left Ventricle: The left ventricular systolic function is moderately decreased, with an estimated ejection fraction of 35-40%. There are no regional wall motion abnormalities. The left ventricular cavity size is normal. Abnormal (paradoxical) septal motion, consistent with left bundle branch block. Spectral Doppler shows an impaired relaxation pattern of left ventricular diastolic filling. Left Atrium: The left atrium is normal in size. Right Ventricle: The right ventricle is normal in size. There is normal right ventricular global systolic function. Right Atrium: The right atrium is normal in size. Aortic Valve: The aortic valve is probably trileaflet. There is no evidence of aortic valve regurgitation. Mitral Valve: The  mitral valve is mildly thickened. There is trace mitral valve regurgitation. Tricuspid Valve: The tricuspid valve is structurally normal. No evidence of tricuspid regurgitation. Pulmonic Valve: The pulmonic valve is structurally normal. There is no indication of pulmonic valve regurgitation. Pericardium: There is no pericardial effusion noted. Aorta: The aortic root is normal. In comparison to the previous echocardiogram(s): Compared with study from 1/16/2024, LV function has declined. Was previously normal.  CONCLUSIONS:  1. Left ventricular systolic function is moderately decreased with a 35-40% estimated ejection fraction.  2. Abnormal septal motion consistent with left bundle branch block.  3. Spectral Doppler shows an impaired relaxation pattern of left ventricular diastolic filling.  4. No vegetations visualized within the limitations of this study.  5. Compared with study from 1/16/2024, LV function has declined. Was previously normal. QUANTITATIVE DATA SUMMARY: LA VOLUME:                               Normal Ranges: LA Vol A4C:        40.1 ml    (22+/-6mL/m2) LA Vol A2C:        35.3 ml LA Vol BP:         40.9 ml LA Vol Index A4C:  22.3ml/m2 LA Vol Index A2C:  19.6 ml/m2 LA Vol Index BP:   22.7 ml/m2 LA Area A4C:       15.3 cm2 LA Area A2C:       13.2 cm2 LA Major Axis A4C: 5.0 cm LA Major Axis A2C: 4.2 cm LA Volume Index:   22.7 ml/m2  [id number] Brandon Nur DO Electronically signed on 5/3/2024 at 4:24:04 PM  ** Final **     Lower extremity venous duplex right    Result Date: 5/1/2024             [place] [address]   Tel [phone] and Fax [phone]  Vascular Lab Report [facility] US LOWER EXTREMITY VENOUS DUPLEX RIGHT  Patient Name:      XIOMARA Maya Physician:  [id number] Rayo Jauregui MD, RPVI Study Date:        4/30/2024            Ordering Physician: [id number] FARMER MRN/PID:           [id number]              Technologist:       Megan Shay RVT Accession#:        [id number]         Technologist 2: Date of Birth/Age: 1985 / 39 years Encounter#:         [id number] Gender:            M Admission Status:  Emergency            Location Performed: [facility]  Diagnosis/ICD: Pain in right leg-M79.604 CPT Codes:     35021 Peripheral venous duplex scan for DVT Limited  **CRITICAL RESULT** Critical Result: Acute DVT in the right leg. Notification called to Deep Paniagua PA-C on 4/30/2024 at 10:52:21 AM by Megan Shay RDMS, RVT.  CONCLUSIONS: Right Lower Venous: There is acute non-occlusive deep vein thrombosis visualized in the common femoral vein. There are chronic changes visualized in the popliteal vein. Enlarged lymph node noted in the right groin. Waveforms suggestive of more distal obstruction/thrombus. May wish further means of imaging evaluation. Left Lower Venous: There are chronic changes visualized in the common femoral vein.  Comparison: Compared with study from 7/27/2023, New finding of acute non-occlusive thrombus in the right CFV.  Imaging & Doppler Findings:  Right                 Compressible      Thrombus          Flow Distal External Iliac                     None CFV                     Partial    Acute non-occlusive Continuous PFV                       Yes             None FV Proximal               Yes             None         Continuous FV Mid                    Yes             None FV Distal                 Yes             None Popliteal                 Yes            Chronic       Continuous Peroneal                  Yes             None PTV                       Yes             None  Left Compress Thrombus        Flow CFV    Yes    Chronic  Spontaneous/Phasic  [id number] Rayo Jauregui MD, RPVI Electronically signed by [id number] Rayo Jauregui MD, RPBRIAN on 5/1/2024 at 9:13:07 AM  ** Final **     XR chest 1 view    Result Date: 4/30/2024  STUDY: Chest Radiograph;  4/30/2024 at 2:42 PM.  INDICATION: Fever. COMPARISON: XR chest 1/14/2024, 11/10/2023; CTA chest 2/15/2023. ACCESSION NUMBER(S): [id number] ORDERING CLINICIAN: WENDY INFANTE TECHNIQUE:  Frontal chest was obtained at 14:42 hours. FINDINGS: CARDIOMEDIASTINAL SILHOUETTE: Cardiomediastinal silhouette is normal in size and configuration.  LUNGS: Patchy right basilar infiltrate or atelectasis with small right pleural effusion. Left basilar scar versus atelectasis. ABDOMEN: No remarkable upper abdominal findings.  BONES: No acute osseous changes.    Patchy right basilar infiltrate or atelectasis with small right pleural effusion. Signed by Joseph Overton MD    CT thoracic spine wo IV contrast    Result Date: 4/30/2024  STUDY: CT Thoracic Spine and Lumbar Spine without IV Contrast; 4/30/2024 11:11 AM INDICATION: Midline back pain. COMPARISON: None Available. ACCESSION NUMBER(S): [id number], [id number] ORDERING CLINICIAN: RAJNI FARMER TECHNIQUE:  CT of the thoracic spine and lumbar spine was performed without intravenous or intrathecal contrast.  Sagittal and coronal reconstructions were generated.  Automated mA/kV exposure control was utilized and patient examination was performed in strict accordance with principles of ALARA. FINDINGS: Portions of the T1 vertebral body, as well as a small portion of the anterior superior aspect of T2 is cut off on this study.  No compression deformities are visualized within the thoracic vertebral bodies from T2 through T12.  No spondylolisthesis is noted.  No pedicular defects are noted.  No prominent edema is appreciated within the adjacent posterior paravertebral musculature.  Evaluation of the thecal sac is limited due to lack of myelographic contrast.  There are enlarged lymph nodes seen within the visualized portions of the mediastinum.  There are bilateral lower lobe infiltrates with tiny bilateral effusions.  Coronary artery calcifications are present. No compression deformities are visualized  within the lumbar spine.  No pars defects are noted.  No significant spondylolisthesis is seen.  No prominent edema is appreciated within the visualized portions of the psoas musculature.  A long dialysis catheter is visualized within the inferior vena cava.  Evaluation of the thecal sac is limited due to lack of myelographic contrast.  There is disc bulging seen from L3 through S1.  No significant impingement is appreciated upon the neural foramina on the sagittal reconstructions.    Thoracic spine: 1.  Portions of T1 and T2 cough on this study. 2.  No acute osseous findings seen from T3 through T12. 3.  Tiny bilateral effusions with adjacent lower lobe infiltrates. 4.  The distal adenopathy. 5.  Coronary artery calcifications. Lumbar spine: 1.  No compression deformities or spinal listhesis is noted. 2.  Disc bulging from L3 to S1.  Evaluation for spinal stenosis is limited due to lack mammographic contrast. 3.  Incidental note of a long dialysis catheter within the inferior vena cava. Signed by Deejay Hoffman MD    CT lumbar spine wo IV contrast    Result Date: 4/30/2024  STUDY: CT Thoracic Spine and Lumbar Spine without IV Contrast; 4/30/2024 11:11 AM INDICATION: Midline back pain. COMPARISON: None Available. ACCESSION NUMBER(S): [id number], [id number] ORDERING CLINICIAN: RAJNI FARMER TECHNIQUE:  CT of the thoracic spine and lumbar spine was performed without intravenous or intrathecal contrast.  Sagittal and coronal reconstructions were generated.  Automated mA/kV exposure control was utilized and patient examination was performed in strict accordance with principles of ALARA. FINDINGS: Portions of the T1 vertebral body, as well as a small portion of the anterior superior aspect of T2 is cut off on this study.  No compression deformities are visualized within the thoracic vertebral bodies from T2 through T12.  No spondylolisthesis is noted.  No pedicular defects are noted.  No prominent edema is appreciated  within the adjacent posterior paravertebral musculature.  Evaluation of the thecal sac is limited due to lack of myelographic contrast.  There are enlarged lymph nodes seen within the visualized portions of the mediastinum.  There are bilateral lower lobe infiltrates with tiny bilateral effusions.  Coronary artery calcifications are present. No compression deformities are visualized within the lumbar spine.  No pars defects are noted.  No significant spondylolisthesis is seen.  No prominent edema is appreciated within the visualized portions of the psoas musculature.  A long dialysis catheter is visualized within the inferior vena cava.  Evaluation of the thecal sac is limited due to lack of myelographic contrast.  There is disc bulging seen from L3 through S1.  No significant impingement is appreciated upon the neural foramina on the sagittal reconstructions.    Thoracic spine: 1.  Portions of T1 and T2 cough on this study. 2.  No acute osseous findings seen from T3 through T12. 3.  Tiny bilateral effusions with adjacent lower lobe infiltrates. 4.  The distal adenopathy. 5.  Coronary artery calcifications. Lumbar spine: 1.  No compression deformities or spinal listhesis is noted. 2.  Disc bulging from L3 to S1.  Evaluation for spinal stenosis is limited due to lack mammographic contrast. 3.  Incidental note of a long dialysis catheter within the inferior vena cava. Signed by Deejay Hoffman MD    XR pelvis 1-2 views    Result Date: 4/30/2024  STUDY: Pelvis Radiographs; 4/30/2024 at 9:06 AM. INDICATION: Pain. COMPARISON: CT pelvis 7/17/2023. ACCESSION NUMBER(S): [id number] ORDERING CLINICIAN: RAJNI FARMER TECHNIQUE:  One view(s) of the pelvis. FINDINGS:  The pelvic ring is intact.  There is no acute fracture.  Severe underlying osteopenia.    Slightly limited secondary to underlying osteopenia.  No acute osseous abnormality. Signed by Roe Ruiz MD       Assessment and Plan    Edwar Hemphill is a 38 y.o. male with  a past medical history of end-stage renal disease on hemodialysis via right femoral TDC who resides at [facility]. 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  He later had stump debridement on 7/27 with wound VAC placement.  He had polymicrobial infection including resistant E coli, Acinetobacter baumannii and Enterobacter fecalis. He was admitted last September with positive blood cultures growing Acinetobacter baumannii and Stenotrophomonas. He went to IR for dialysis line exchange.  He then was admitted again in November where CT noted left sided fluid collection concerning for abscess.  He grew MRSA and Streptococcus, catheter was exchanged on November 13, he completed vancomycin. He then had Staph aureus CLABSI again in January of this.  POLI negative for endocarditis. HD cath removed 1/16 and replaced on 1/19 to the L groin. IV Vancomycin 750mg with HD through 2/14/24.      He comes in now with fever with low back pain radiating down the right leg. Blood cultures were obtained and came back growing MRSA. Infectious disease is following and managing antimicrobial coverage. He remains bacteremic with MRSA despite guidewire exchange of the groin tunneled HD catheter on 5/3. MRI of the lumbar spine did not show osteomyelitis/discitis and 2 D ECHO was negative for a vegetation. We dialyzed him on Wednesday. We removed his line all together for a line holiday on 5/9.     We dialyzed him yesterday via a right femoral temporary dialysis line then removed it. Repeat cultures will be drawn today. Antimicrobial coverage was adjusted to daptomycin and Teflaro. Monitoring him closely  while on a line holiday.         I spent a total of 40 minutes with this patient today.    Eber Bruce, DO

## 2024-05-21 NOTE — PROGRESS NOTES
05/21/24 0725   Discharge Planning   Patient expects to be discharged to: re: [facility] I[facility]     Temp HD cath removed yesterday (blood cultures still positive). ID following for IV abx. Plan to repeat blood cultures today. Current DC plan return to [facility] when medically cleared.

## 2024-05-21 NOTE — PROGRESS NOTES
"  INFECTIOUS DISEASE DAILY PROGRESS NOTE    SUBJECTIVE:    No overnight events. No new complaints. Afebrile. No rash/itching/diarrhea. Temp HD catheter removed on 5/20.    OBJECTIVE:  VITALS (Last 24 Hours)  /79 (BP Location: Left arm, Patient Position: Lying)   Pulse 93   Temp 37.1 °C (98.8 °F) (Oral)   Resp 18   Ht 1.753 m (5' 9.02\")   Wt 70.3 kg (155 lb)   SpO2 99%   BMI 22.88 kg/m²     PHYSICAL EXAM:  Gen - laying in bed  Abd - soft, no tenderness, BS present  RLE - s/p AKA  RUE - s/p amputation  Skin - no rash     ABX: IV Daptomycin and Ceftaroline    LABS:  Lab Results   Component Value Date    WBC 5.0 05/20/2024    HGB 7.7 (L) 05/20/2024    HCT 25.3 (L) 05/20/2024    MCV 89 05/20/2024     (L) 05/20/2024     Lab Results   Component Value Date    GLUCOSE 68 (L) 05/20/2024    CALCIUM 7.8 (L) 05/20/2024     (L) 05/20/2024    K 3.5 05/20/2024    CO2 29 05/20/2024    CL 98 05/20/2024    BUN 10 05/20/2024    CREATININE 3.26 (H) 05/20/2024     Results from last 72 hours   Lab Units 05/20/24  1126   ALBUMIN g/dL 2.3*     Estimated Creatinine Clearance: 30 mL/min (A) (by C-G formula based on SCr of 3.26 mg/dL (H)).    ASSESSMENT/PLAN:     CLABSI (HD cath POA) due to MRSA - line exchanged 5/3. Not having a full line holiday because of risk of losing HD access. TTE without endocarditis. Repeat blood cx 5/5 and 5/7 still positive. HD cath removed on 5/9. 5/10 blood cx still positive. New temp HD cath placed 5/13. POLI negative for endocarditis 5/14. Blood cx 5/14 positive. Dapto/Ceftaroline started 5/16 and then 5/17 blood cx still positive. Temp HD cath removed 5/20.  Lumbar Spine Pain - CT without epidural abscess. MRI without OM/discitis.  DM II with DKA - DKA resolved     Combination abx therapy Daptomycin/Ceftaroline still.    Blood cx x2 today.      Monitoring for adverse effects of abx such as rash/itching/diarrhea - none apparent.     Will follow. Thanks!    Mat Jauregui MD  ID " [facility]  Office #[phone]

## 2024-05-21 NOTE — PROGRESS NOTES
Edwar Hemphill is a 39 y.o. male on day 21 of admission presenting with SIRS (systemic inflammatory response syndrome) (Multi).      Subjective   No issues   Resting in bed    No new complaints      Objective     Last Recorded Vitals  /75 (BP Location: Left arm, Patient Position: Lying)   Pulse 87   Temp 36.4 °C (97.6 °F) (Axillary)   Resp 18   Wt 70.3 kg (155 lb)   SpO2 97%   intake/Output last 3 Shifts:      Admission Weight  Weight: 65.8 kg (145 lb) (04/30/24 6302)      Image Results  Electrocardiogram, 12-lead PRN ACS symptoms  Atrial fibrillation with rapid ventricular response with premature ventricular or aberrantly conducted complexes  Nonspecific ST and T wave abnormality , probably digitalis effect  Abnormal ECG  When compared with ECG of 16-JAN-2024 07:06,  Atrial fibrillation has replaced Sinus rhythm  ST elevation now present in Inferior leads  Nonspecific T wave abnormality, worse in Lateral leads  Confirmed by Brandon Nur ([id number]) on 5/20/2024 7:36:36 AM      PHYSICAL EXAM  NEUROLOGICAL: No abnormal movements, no changes from before  HEENT: Head atraumatic, perrl,eomi, no oral lesions, no ear rash   NECK: Supple, no ln, jvp flat, thyroid palp  HEART: S1, S2, no added sound  LUNGS: dec a/e  EXTREMITIES: rt ankle edema presetn   Rt groin HD access, no drainage  ABDOMEN: Soft, nontender, bowel sound positive, no organomegaly  SKIN: No change    Relevant Results  Scheduled medications  [Held by provider] apixaban, 5 mg, oral, BID  B complex-vitamin C-folic acid, 1 capsule, oral, Daily  ceftaroline, 200 mg, intravenous, q8h  DAPTOmycin, 700 mg, intravenous, q48h  epoetin tyson or biosimilar, 100 Units/kg, subcutaneous, Every Mon/Wed/Fri  heparin, 3,100 Units, intra-catheter, After Dialysis  heparin, 3,100 Units, intra-catheter, After Dialysis  insulin glargine, 2 Units, subcutaneous, Nightly  insulin lispro, 0-5 Units, subcutaneous, TID  levothyroxine, 125 mcg, oral, Daily before  breakfast  pantoprazole, 40 mg, intravenous, BID  polyethylene glycol, 17 g, oral, Daily  sennosides, 8.6 mg, oral, BID  sevelamer carbonate, 2,400 mg, oral, TID  sodium zirconium cyclosilicate, 10 g, oral, Daily      Continuous medications  oxygen, , Last Rate: 2 L/min (05/03/24 7328)      PRN medications  PRN medications: cyclobenzaprine, dextrose, dextrose, fentaNYL PF, glucagon, heparin, lidocaine, lidocaine PF, loperamide, midazolam, nitroglycerin, ondansetron, oxyCODONE, oxygen, oxygen, oxygen, simethicone  Results for orders placed or performed during the hospital encounter of 04/30/24 (from the past 96 hour(s))   POCT GLUCOSE   Result Value Ref Range    POCT Glucose 171 (H) 74 - 99 mg/dL   Blood Culture    Specimen: Peripheral Venipuncture; Blood culture   Result Value Ref Range    Blood Culture Staphylococcus aureus (AA)     BLOOD CULTURE BOTTLE  Positive Aerobic Bottle     Gram Stain Gram positive cocci, clusters (AA)    Blood Culture    Specimen: Dialysis; Blood culture   Result Value Ref Range    Blood Culture Staphylococcus aureus (AA)     BLOOD CULTURE BOTTLE  Positive Aerobic Bottle     Gram Stain Gram positive cocci, clusters (AA)    POCT GLUCOSE   Result Value Ref Range    POCT Glucose 142 (H) 74 - 99 mg/dL   Renal function panel   Result Value Ref Range    Glucose 108 (H) 74 - 99 mg/dL    Sodium 136 136 - 145 mmol/L    Potassium 3.6 3.5 - 5.3 mmol/L    Chloride 99 98 - 107 mmol/L    Bicarbonate 28 21 - 32 mmol/L    Anion Gap 13 10 - 20 mmol/L    Urea Nitrogen 15 6 - 23 mg/dL    Creatinine 3.83 (H) 0.50 - 1.30 mg/dL    eGFR 19 (L) >60 mL/min/1.73m*2    Calcium 8.1 (L) 8.6 - 10.3 mg/dL    Phosphorus 3.2 2.5 - 4.9 mg/dL    Albumin 2.1 (L) 3.4 - 5.0 g/dL   POCT GLUCOSE   Result Value Ref Range    POCT Glucose 128 (H) 74 - 99 mg/dL   POCT GLUCOSE   Result Value Ref Range    POCT Glucose 103 (H) 74 - 99 mg/dL   POCT GLUCOSE   Result Value Ref Range    POCT Glucose 148 (H) 74 - 99 mg/dL   POCT GLUCOSE    Result Value Ref Range    POCT Glucose 160 (H) 74 - 99 mg/dL   Renal Function Panel   Result Value Ref Range    Glucose 124 (H) 74 - 99 mg/dL    Sodium 132 (L) 136 - 145 mmol/L    Potassium 3.9 3.5 - 5.3 mmol/L    Chloride 98 98 - 107 mmol/L    Bicarbonate 23 21 - 32 mmol/L    Anion Gap 15 10 - 20 mmol/L    Urea Nitrogen 15 6 - 23 mg/dL    Creatinine 4.76 (H) 0.50 - 1.30 mg/dL    eGFR 15 (L) >60 mL/min/1.73m*2    Calcium 8.7 8.6 - 10.3 mg/dL    Phosphorus 4.6 2.5 - 4.9 mg/dL    Albumin 2.3 (L) 3.4 - 5.0 g/dL   POCT GLUCOSE   Result Value Ref Range    POCT Glucose 106 (H) 74 - 99 mg/dL   POCT GLUCOSE   Result Value Ref Range    POCT Glucose 90 74 - 99 mg/dL   CBC   Result Value Ref Range    WBC 5.8 4.4 - 11.3 x10*3/uL    nRBC 0.0 0.0 - 0.0 /100 WBCs    RBC 2.88 (L) 4.50 - 5.90 x10*6/uL    Hemoglobin 7.9 (L) 13.5 - 17.5 g/dL    Hematocrit 25.7 (L) 41.0 - 52.0 %    MCV 89 80 - 100 fL    MCH 27.4 26.0 - 34.0 pg    MCHC 30.7 (L) 32.0 - 36.0 g/dL    RDW 17.5 (H) 11.5 - 14.5 %    Platelets 207 150 - 450 x10*3/uL   SST TOP   Result Value Ref Range    Extra Tube Hold for add-ons.    POCT GLUCOSE   Result Value Ref Range    POCT Glucose 65 (L) 74 - 99 mg/dL   POCT GLUCOSE   Result Value Ref Range    POCT Glucose 86 74 - 99 mg/dL   CBC   Result Value Ref Range    WBC 5.0 4.4 - 11.3 x10*3/uL    nRBC 0.0 0.0 - 0.0 /100 WBCs    RBC 2.84 (L) 4.50 - 5.90 x10*6/uL    Hemoglobin 7.7 (L) 13.5 - 17.5 g/dL    Hematocrit 25.3 (L) 41.0 - 52.0 %    MCV 89 80 - 100 fL    MCH 27.1 26.0 - 34.0 pg    MCHC 30.4 (L) 32.0 - 36.0 g/dL    RDW 17.5 (H) 11.5 - 14.5 %    Platelets 149 (L) 150 - 450 x10*3/uL   Renal Function Panel   Result Value Ref Range    Glucose 68 (L) 74 - 99 mg/dL    Sodium 134 (L) 136 - 145 mmol/L    Potassium 3.5 3.5 - 5.3 mmol/L    Chloride 98 98 - 107 mmol/L    Bicarbonate 29 21 - 32 mmol/L    Anion Gap 11 10 - 20 mmol/L    Urea Nitrogen 10 6 - 23 mg/dL    Creatinine 3.26 (H) 0.50 - 1.30 mg/dL    eGFR 24 (L) >60  mL/min/1.73m*2    Calcium 7.8 (L) 8.6 - 10.3 mg/dL    Phosphorus 2.9 2.5 - 4.9 mg/dL    Albumin 2.3 (L) 3.4 - 5.0 g/dL   POCT GLUCOSE   Result Value Ref Range    POCT Glucose 68 (L) 74 - 99 mg/dL   POCT GLUCOSE   Result Value Ref Range    POCT Glucose 113 (H) 74 - 99 mg/dL   POCT GLUCOSE   Result Value Ref Range    POCT Glucose 94 74 - 99 mg/dL   POCT GLUCOSE   Result Value Ref Range    POCT Glucose 116 (H) 74 - 99 mg/dL   POCT GLUCOSE   Result Value Ref Range    POCT Glucose 82 74 - 99 mg/dL                Assessment/Plan        This patient has a central line   Reason for the central line remaining today? Dialysis/Hemapheresis  no      Principal Problem:    SIRS (systemic inflammatory response syndrome) (Multi)    T2dm  Anemia renal origin   Oa  Pvd  Rt upper ext amputation and left lower ext amputation    Input noted from renal  Concern of line infection >> his temp line was removed 5/20/24  On line holiday  Follow cultures    Abx as per ID    Check labs    -Continue current treatment as ordered. Will make adjustments as necessary.          Malnutrition Diagnosis Status: New  Malnutrition Diagnosis: Severe malnutrition related to chronic disease or condition  As Evidenced by: 18% weight loss over 8 months, intake <75% meals for > 1 month.  I agree with the dietitian's malnutrition diagnosis.     Plan of care discussed with: Provider, RN, Patient    Patient case and plan of care discussed with Dr. DAWNA Estrada.    AMARILYS Haas - CNP  -In collaboration with Dr. DAWNA Estrada    [facility]  Office: [phone]  Fax: [phone]  I have reviewed the above note obtained and documented by the NP/PA and I personally participated in the key components. I have discussed the case and management of the patient's care. Changes made to the note, and all key components of history and physical/progress note done by me.  dw nursing  HD access removed 5/20  Currently appears euvolemic  ID  following   Dw mother  El Estrada MD

## 2024-05-22 LAB
BACTERIA BLD AEROBE CULT: ABNORMAL
BACTERIA BLD AEROBE CULT: ABNORMAL
BACTERIA BLD CULT: ABNORMAL
BACTERIA BLD CULT: ABNORMAL
GLUCOSE BLD MANUAL STRIP-MCNC: 104 MG/DL (ref 74–99)
GLUCOSE BLD MANUAL STRIP-MCNC: 36 MG/DL (ref 74–99)
GLUCOSE BLD MANUAL STRIP-MCNC: 40 MG/DL (ref 74–99)
GLUCOSE BLD MANUAL STRIP-MCNC: 74 MG/DL (ref 74–99)
GLUCOSE BLD MANUAL STRIP-MCNC: 82 MG/DL (ref 74–99)
GLUCOSE BLD MANUAL STRIP-MCNC: 82 MG/DL (ref 74–99)
GRAM STN SPEC: ABNORMAL
GRAM STN SPEC: ABNORMAL

## 2024-05-22 PROCEDURE — 2500000005 HC RX 250 GENERAL PHARMACY W/O HCPCS: Performed by: PHYSICIAN ASSISTANT

## 2024-05-22 PROCEDURE — 2500000006 HC RX 250 W HCPCS SELF ADMINISTERED DRUGS (ALT 637 FOR ALL PAYERS): Performed by: NURSE PRACTITIONER

## 2024-05-22 PROCEDURE — 2500000001 HC RX 250 WO HCPCS SELF ADMINISTERED DRUGS (ALT 637 FOR MEDICARE OP): Performed by: NURSE PRACTITIONER

## 2024-05-22 PROCEDURE — 6350000001 HC RX 635 EPOETIN >10,000 UNITS: Mod: JW | Performed by: INTERNAL MEDICINE

## 2024-05-22 PROCEDURE — 2500000004 HC RX 250 GENERAL PHARMACY W/ HCPCS (ALT 636 FOR OP/ED): Performed by: NURSE PRACTITIONER

## 2024-05-22 PROCEDURE — 99231 SBSQ HOSP IP/OBS SF/LOW 25: CPT | Performed by: INTERNAL MEDICINE

## 2024-05-22 PROCEDURE — C9113 INJ PANTOPRAZOLE SODIUM, VIA: HCPCS

## 2024-05-22 PROCEDURE — 1200000002 HC GENERAL ROOM WITH TELEMETRY DAILY

## 2024-05-22 PROCEDURE — 2500000004 HC RX 250 GENERAL PHARMACY W/ HCPCS (ALT 636 FOR OP/ED): Performed by: INTERNAL MEDICINE

## 2024-05-22 PROCEDURE — 2500000006 HC RX 250 W HCPCS SELF ADMINISTERED DRUGS (ALT 637 FOR ALL PAYERS): Mod: MUE | Performed by: INTERNAL MEDICINE

## 2024-05-22 PROCEDURE — 2500000005 HC RX 250 GENERAL PHARMACY W/O HCPCS: Performed by: INTERNAL MEDICINE

## 2024-05-22 PROCEDURE — 2500000004 HC RX 250 GENERAL PHARMACY W/ HCPCS (ALT 636 FOR OP/ED)

## 2024-05-22 PROCEDURE — 82947 ASSAY GLUCOSE BLOOD QUANT: CPT

## 2024-05-22 RX ORDER — LIDOCAINE 560 MG/1
1 PATCH PERCUTANEOUS; TOPICAL; TRANSDERMAL DAILY
Status: DISCONTINUED | OUTPATIENT
Start: 2024-05-22 | End: 2024-05-26 | Stop reason: HOSPADM

## 2024-05-22 RX ADMIN — OXYCODONE HYDROCHLORIDE 5 MG: 5 TABLET ORAL at 05:55

## 2024-05-22 RX ADMIN — CEFTAROLINE FOSAMIL 200 MG: 600 POWDER, FOR SOLUTION INTRAVENOUS at 11:22

## 2024-05-22 RX ADMIN — POLYETHYLENE GLYCOL 3350 17 G: 17 POWDER, FOR SOLUTION ORAL at 10:14

## 2024-05-22 RX ADMIN — NEPHROCAP 1 CAPSULE: 1 CAP ORAL at 10:13

## 2024-05-22 RX ADMIN — SENNOSIDES 8.6 MG: 8.6 TABLET, FILM COATED ORAL at 10:29

## 2024-05-22 RX ADMIN — CEFTAROLINE FOSAMIL 200 MG: 600 POWDER, FOR SOLUTION INTRAVENOUS at 02:58

## 2024-05-22 RX ADMIN — PANTOPRAZOLE SODIUM 40 MG: 40 INJECTION, POWDER, FOR SOLUTION INTRAVENOUS at 10:14

## 2024-05-22 RX ADMIN — DEXTROSE MONOHYDRATE 25 G: 25 INJECTION, SOLUTION INTRAVENOUS at 09:55

## 2024-05-22 RX ADMIN — SEVELAMER CARBONATE 2400 MG: 800 TABLET, FILM COATED ORAL at 17:53

## 2024-05-22 RX ADMIN — OXYCODONE HYDROCHLORIDE 5 MG: 5 TABLET ORAL at 00:52

## 2024-05-22 RX ADMIN — OXYCODONE HYDROCHLORIDE 5 MG: 5 TABLET ORAL at 10:53

## 2024-05-22 RX ADMIN — EPOETIN ALFA-EPBX 8000 UNITS: 10000 INJECTION, SOLUTION INTRAVENOUS; SUBCUTANEOUS at 17:56

## 2024-05-22 RX ADMIN — CEFTAROLINE FOSAMIL 200 MG: 600 POWDER, FOR SOLUTION INTRAVENOUS at 18:41

## 2024-05-22 RX ADMIN — DAPTOMYCIN IN SODIUM CHLORIDE 700 MG: 700 INJECTION, SOLUTION INTRAVENOUS at 17:53

## 2024-05-22 RX ADMIN — SODIUM ZIRCONIUM CYCLOSILICATE 10 G: 10 POWDER, FOR SUSPENSION ORAL at 10:29

## 2024-05-22 RX ADMIN — LIDOCAINE 4% 1 PATCH: 40 PATCH TOPICAL at 04:22

## 2024-05-22 RX ADMIN — LEVOTHYROXINE SODIUM 125 MCG: 125 TABLET ORAL at 06:05

## 2024-05-22 RX ADMIN — SEVELAMER CARBONATE 2400 MG: 800 TABLET, FILM COATED ORAL at 13:16

## 2024-05-22 RX ADMIN — PANTOPRAZOLE SODIUM 40 MG: 40 INJECTION, POWDER, FOR SOLUTION INTRAVENOUS at 21:13

## 2024-05-22 ASSESSMENT — COGNITIVE AND FUNCTIONAL STATUS - GENERAL
TURNING FROM BACK TO SIDE WHILE IN FLAT BAD: A LITTLE
TOILETING: A LOT
MOBILITY SCORE: 10
PERSONAL GROOMING: A LITTLE
MOVING TO AND FROM BED TO CHAIR: TOTAL
WALKING IN HOSPITAL ROOM: TOTAL
MOBILITY SCORE: 10
MOVING FROM LYING ON BACK TO SITTING ON SIDE OF FLAT BED WITH BEDRAILS: A LITTLE
TOILETING: A LOT
STANDING UP FROM CHAIR USING ARMS: TOTAL
TURNING FROM BACK TO SIDE WHILE IN FLAT BAD: A LITTLE
MOVING FROM LYING ON BACK TO SITTING ON SIDE OF FLAT BED WITH BEDRAILS: A LITTLE
CLIMB 3 TO 5 STEPS WITH RAILING: TOTAL
HELP NEEDED FOR BATHING: A LOT
MOVING TO AND FROM BED TO CHAIR: TOTAL
PERSONAL GROOMING: A LITTLE
CLIMB 3 TO 5 STEPS WITH RAILING: TOTAL
DRESSING REGULAR UPPER BODY CLOTHING: A LOT
DAILY ACTIVITIY SCORE: 15
DRESSING REGULAR LOWER BODY CLOTHING: A LOT
DRESSING REGULAR UPPER BODY CLOTHING: A LOT
STANDING UP FROM CHAIR USING ARMS: TOTAL
DAILY ACTIVITIY SCORE: 15
DRESSING REGULAR LOWER BODY CLOTHING: A LOT
HELP NEEDED FOR BATHING: A LOT
WALKING IN HOSPITAL ROOM: TOTAL

## 2024-05-22 ASSESSMENT — PAIN - FUNCTIONAL ASSESSMENT
PAIN_FUNCTIONAL_ASSESSMENT: 0-10

## 2024-05-22 ASSESSMENT — PAIN SCALES - GENERAL
PAINLEVEL_OUTOF10: 5 - MODERATE PAIN
PAINLEVEL_OUTOF10: 0 - NO PAIN
PAINLEVEL_OUTOF10: 8
PAINLEVEL_OUTOF10: 6
PAINLEVEL_OUTOF10: 7

## 2024-05-22 ASSESSMENT — PAIN DESCRIPTION - ORIENTATION: ORIENTATION: RIGHT

## 2024-05-22 ASSESSMENT — PAIN DESCRIPTION - LOCATION: LOCATION: BACK

## 2024-05-22 NOTE — PROGRESS NOTES
Edwar Hemphill is a 39 y.o. male on day 22 of admission presenting with SIRS (systemic inflammatory response syndrome) (Multi).      Subjective pt  restin gin bed  Nad  No new complaints      Objective     Last Recorded Vitals  /81 (Patient Position: Lying)   Pulse 91   Temp 36.7 °C (98.1 °F)   Resp 18   Wt 70.3 kg (155 lb)   SpO2 92%   intake/Output last 3 Shifts:      Admission Weight  Weight: 65.8 kg (145 lb) (04/30/24 0825)      Image Results  Electrocardiogram, 12-lead PRN ACS symptoms  Atrial fibrillation with rapid ventricular response with premature ventricular or aberrantly conducted complexes  Nonspecific ST and T wave abnormality , probably digitalis effect  Abnormal ECG  When compared with ECG of 16-JAN-2024 07:06,  Atrial fibrillation has replaced Sinus rhythm  ST elevation now present in Inferior leads  Nonspecific T wave abnormality, worse in Lateral leads  Confirmed by Brandon Nur ([id number]) on 5/20/2024 7:36:36 AM      PHYSICAL EXAM    NEUROLOGICAL: No abnormal movements, no changes from before  HEENT: Head atraumatic, perrl,eomi, no oral lesions, no ear rash   NECK: Supple, no ln, jvp flat, thyroid palp  HEART: S1, S2, no added sound  LUNGS: dec a/e  EXTREMITIES: rt ankle edema presetn   Rt groin HD access, no drainage  ABDOMEN: Soft, nontender, bowel sound positive, no organomegaly  SKIN: No change    Relevant Results  Scheduled medications  [Held by provider] apixaban, 5 mg, oral, BID  B complex-vitamin C-folic acid, 1 capsule, oral, Daily  ceftaroline, 200 mg, intravenous, q8h  DAPTOmycin, 700 mg, intravenous, q48h  epoetin tyson or biosimilar, 100 Units/kg, subcutaneous, Every Mon/Wed/Fri  heparin, 3,100 Units, intra-catheter, After Dialysis  heparin, 3,100 Units, intra-catheter, After Dialysis  insulin glargine, 2 Units, subcutaneous, Nightly  insulin lispro, 0-5 Units, subcutaneous, TID  levothyroxine, 125 mcg, oral, Daily before breakfast  lidocaine, 1 patch, transdermal,  Daily  pantoprazole, 40 mg, intravenous, BID  polyethylene glycol, 17 g, oral, Daily  sennosides, 8.6 mg, oral, BID  sevelamer carbonate, 2,400 mg, oral, TID  sodium zirconium cyclosilicate, 10 g, oral, Daily      Continuous medications  oxygen, , Last Rate: 2 L/min (05/03/24 1758)      PRN medications  PRN medications: cyclobenzaprine, dextrose, dextrose, fentaNYL PF, glucagon, heparin, lidocaine, lidocaine PF, loperamide, midazolam, nitroglycerin, ondansetron, oxyCODONE, oxygen, oxygen, oxygen, simethicone  Results for orders placed or performed during the hospital encounter of 04/30/24 (from the past 96 hour(s))   POCT GLUCOSE   Result Value Ref Range    POCT Glucose 148 (H) 74 - 99 mg/dL   POCT GLUCOSE   Result Value Ref Range    POCT Glucose 160 (H) 74 - 99 mg/dL   Renal Function Panel   Result Value Ref Range    Glucose 124 (H) 74 - 99 mg/dL    Sodium 132 (L) 136 - 145 mmol/L    Potassium 3.9 3.5 - 5.3 mmol/L    Chloride 98 98 - 107 mmol/L    Bicarbonate 23 21 - 32 mmol/L    Anion Gap 15 10 - 20 mmol/L    Urea Nitrogen 15 6 - 23 mg/dL    Creatinine 4.76 (H) 0.50 - 1.30 mg/dL    eGFR 15 (L) >60 mL/min/1.73m*2    Calcium 8.7 8.6 - 10.3 mg/dL    Phosphorus 4.6 2.5 - 4.9 mg/dL    Albumin 2.3 (L) 3.4 - 5.0 g/dL   POCT GLUCOSE   Result Value Ref Range    POCT Glucose 106 (H) 74 - 99 mg/dL   POCT GLUCOSE   Result Value Ref Range    POCT Glucose 90 74 - 99 mg/dL   CBC   Result Value Ref Range    WBC 5.8 4.4 - 11.3 x10*3/uL    nRBC 0.0 0.0 - 0.0 /100 WBCs    RBC 2.88 (L) 4.50 - 5.90 x10*6/uL    Hemoglobin 7.9 (L) 13.5 - 17.5 g/dL    Hematocrit 25.7 (L) 41.0 - 52.0 %    MCV 89 80 - 100 fL    MCH 27.4 26.0 - 34.0 pg    MCHC 30.7 (L) 32.0 - 36.0 g/dL    RDW 17.5 (H) 11.5 - 14.5 %    Platelets 207 150 - 450 x10*3/uL   SST TOP   Result Value Ref Range    Extra Tube Hold for add-ons.    POCT GLUCOSE   Result Value Ref Range    POCT Glucose 65 (L) 74 - 99 mg/dL   POCT GLUCOSE   Result Value Ref Range    POCT Glucose 86 74 -  99 mg/dL   CBC   Result Value Ref Range    WBC 5.0 4.4 - 11.3 x10*3/uL    nRBC 0.0 0.0 - 0.0 /100 WBCs    RBC 2.84 (L) 4.50 - 5.90 x10*6/uL    Hemoglobin 7.7 (L) 13.5 - 17.5 g/dL    Hematocrit 25.3 (L) 41.0 - 52.0 %    MCV 89 80 - 100 fL    MCH 27.1 26.0 - 34.0 pg    MCHC 30.4 (L) 32.0 - 36.0 g/dL    RDW 17.5 (H) 11.5 - 14.5 %    Platelets 149 (L) 150 - 450 x10*3/uL   Renal Function Panel   Result Value Ref Range    Glucose 68 (L) 74 - 99 mg/dL    Sodium 134 (L) 136 - 145 mmol/L    Potassium 3.5 3.5 - 5.3 mmol/L    Chloride 98 98 - 107 mmol/L    Bicarbonate 29 21 - 32 mmol/L    Anion Gap 11 10 - 20 mmol/L    Urea Nitrogen 10 6 - 23 mg/dL    Creatinine 3.26 (H) 0.50 - 1.30 mg/dL    eGFR 24 (L) >60 mL/min/1.73m*2    Calcium 7.8 (L) 8.6 - 10.3 mg/dL    Phosphorus 2.9 2.5 - 4.9 mg/dL    Albumin 2.3 (L) 3.4 - 5.0 g/dL   POCT GLUCOSE   Result Value Ref Range    POCT Glucose 68 (L) 74 - 99 mg/dL   POCT GLUCOSE   Result Value Ref Range    POCT Glucose 113 (H) 74 - 99 mg/dL   POCT GLUCOSE   Result Value Ref Range    POCT Glucose 94 74 - 99 mg/dL   POCT GLUCOSE   Result Value Ref Range    POCT Glucose 116 (H) 74 - 99 mg/dL   POCT GLUCOSE   Result Value Ref Range    POCT Glucose 82 74 - 99 mg/dL   SST TOP   Result Value Ref Range    Extra Tube Hold for add-ons.    Blood Culture    Specimen: Peripheral Venipuncture; Blood culture   Result Value Ref Range    Blood Culture Loaded on Instrument - Culture in progress    Blood Culture    Specimen: Peripheral Venipuncture; Blood culture   Result Value Ref Range    Blood Culture Loaded on Instrument - Culture in progress    CBC   Result Value Ref Range    WBC 5.0 4.4 - 11.3 x10*3/uL    nRBC 0.0 0.0 - 0.0 /100 WBCs    RBC 2.63 (L) 4.50 - 5.90 x10*6/uL    Hemoglobin 7.2 (L) 13.5 - 17.5 g/dL    Hematocrit 23.6 (L) 41.0 - 52.0 %    MCV 90 80 - 100 fL    MCH 27.4 26.0 - 34.0 pg    MCHC 30.5 (L) 32.0 - 36.0 g/dL    RDW 17.5 (H) 11.5 - 14.5 %    Platelets 151 150 - 450 x10*3/uL   Renal  function panel   Result Value Ref Range    Glucose 102 (H) 74 - 99 mg/dL    Sodium 132 (L) 136 - 145 mmol/L    Potassium 4.0 3.5 - 5.3 mmol/L    Chloride 98 98 - 107 mmol/L    Bicarbonate 25 21 - 32 mmol/L    Anion Gap 13 10 - 20 mmol/L    Urea Nitrogen 14 6 - 23 mg/dL    Creatinine 4.69 (H) 0.50 - 1.30 mg/dL    eGFR 15 (L) >60 mL/min/1.73m*2    Calcium 8.6 8.6 - 10.3 mg/dL    Phosphorus 4.8 2.5 - 4.9 mg/dL    Albumin 2.3 (L) 3.4 - 5.0 g/dL   POCT GLUCOSE   Result Value Ref Range    POCT Glucose 101 (H) 74 - 99 mg/dL   POCT GLUCOSE   Result Value Ref Range    POCT Glucose 107 (H) 74 - 99 mg/dL   POCT GLUCOSE   Result Value Ref Range    POCT Glucose 127 (H) 74 - 99 mg/dL   POCT GLUCOSE   Result Value Ref Range    POCT Glucose 40 (L) 74 - 99 mg/dL                Assessment/Plan          This patient has a central line   Reason for the central line remaining today? 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 Will make adjustments as necessary.          Malnutrition Diagnosis Status: New  Malnutrition Diagnosis: Severe malnutrition related to chronic disease or condition  As Evidenced by: 18% weight loss over 8 months, intake <75% meals for > 1 month.  I agree with the dietitian's malnutrition diagnosis.     Plan of care discussed with: Provider, RN, Patient    Patient case and plan of care discussed with Dr. DAWNA Estrada.    AMARILYS Haas - CNP  -In collaboration with Dr. DAWNA Estrada    [facility]  Office: [phone]  Fax: [phone]    Addendum    127l  Notified by  nursing of low glucose  Advised to follow hypoglycemia protocol  Can DC lantus he only on 2u  Cont with iss  Encourage po    Doug Go, APRN-CNP   I have reviewed the above note obtained and documented by the NP/PA and I personally participated in the key components. I have discussed the case and management of the patient's care. Changes made to the note, and all key components of history and physical/progress note done by me.  dw nursing  Blood cultures from 5/21 NGTD  Dw ID cont with current   Will follow  Lantus stopped due to issues with hypoglycemia  El Estrada MD

## 2024-05-22 NOTE — CARE PLAN
The patient's goals for the shift include      The clinical goals for the shift include pt will remain free of infection this shift    Problem: Pain  Goal: My pain/discomfort is manageable  Outcome: Progressing     Problem: Safety  Goal: Patient will be injury free during hospitalization  Outcome: Progressing  Goal: I will remain free of falls  Outcome: Progressing     Problem: Daily Care  Goal: Daily care needs are met  Outcome: Progressing     Problem: Psychosocial Needs  Goal: Demonstrates ability to cope with hospitalization/illness  Outcome: Progressing  Goal: Collaborate with me, my family, and caregiver to identify my specific goals  Outcome: Progressing     Problem: Discharge Barriers  Goal: My discharge needs are met  Outcome: Progressing     Problem: Skin  Goal: Participates in plan/prevention/treatment measures  Outcome: Progressing  Goal: Prevent/manage excess moisture  Outcome: Progressing  Goal: Prevent/minimize sheer/friction injuries  Outcome: Progressing  Goal: Promote/optimize nutrition  Outcome: Progressing  Goal: Promote skin healing  Outcome: Progressing     Problem: Fall/Injury  Goal: Not fall by end of shift  Outcome: Progressing  Goal: Be free from injury by end of the shift  Outcome: Progressing  Goal: Verbalize understanding of personal risk factors for fall in the hospital  Outcome: Progressing  Goal: Verbalize understanding of risk factor reduction measures to prevent injury from fall in the home  Outcome: Progressing  Goal: Use assistive devices by end of the shift  Outcome: Progressing  Goal: Pace activities to prevent fatigue by end of the shift  Outcome: Progressing     Problem: Pain - Adult  Goal: Verbalizes/displays adequate comfort level or baseline comfort level  Outcome: Progressing     Problem: Safety - Adult  Goal: Free from fall injury  Outcome: Progressing     Problem: Discharge Planning  Goal: Discharge to home or other facility with appropriate resources  Outcome:  Progressing     Problem: Chronic Conditions and Co-morbidities  Goal: Patient's chronic conditions and co-morbidity symptoms are monitored and maintained or improved  Outcome: Progressing     Problem: Diabetes  Goal: Achieve decreasing blood glucose levels by end of shift  Outcome: Progressing  Goal: Increase stability of blood glucose readings by end of shift  Outcome: Progressing  Goal: Decrease in ketones present in urine by end of shift  Outcome: Progressing  Goal: Maintain electrolyte levels within acceptable range throughout shift  Outcome: Progressing  Goal: Maintain glucose levels >70mg/dl to <250mg/dl throughout shift  Outcome: Progressing  Goal: No changes in neurological exam by end of shift  Outcome: Progressing  Goal: Learn about and adhere to nutrition recommendations by end of shift  Outcome: Progressing  Goal: Vital signs within normal range for age by end of shift  Outcome: Progressing  Goal: Increase self care and/or family involovement by end of shift  Outcome: Progressing  Goal: Receive DSME education by end of shift  Outcome: Progressing

## 2024-05-22 NOTE — PROGRESS NOTES
Edwar Hemphill is a 39 y.o. male on day 22 of admission presenting with SIRS (systemic inflammatory response syndrome) (Multi).    Subjective   Mr. Hemphill is a 39-year-old man with a history of ESRD on hemodialysis.  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  He has had many bloodstream infections related to catheters, again during this admission.  He has MRSA, permcath was removed but he required a temporary femoral hemodialysis catheter due to persistent bacteremia.  TTE without endocarditis, then had a POLI as well on May 14.  CT was without an epidural abscess, MRI was done that was also negative for infection.    Resting comfortably in bed. No respiratory complaints.   GILMAR. He does not offer specific complaints.        Objective       Physical Exam  Constitutional:       Appearance: Normal appearance.   HENT:      Mouth/Throat:      Mouth: Mucous membranes are moist.   Eyes:      Extraocular Movements: Extraocular movements intact.      Pupils: Pupils are equal, round, and reactive to light.   Cardiovascular:      Rate and Rhythm: Regular rhythm.      Heart sounds: S1 normal and S2 normal.   Pulmonary:      Breath sounds: Poor effort, no crackles  Abdominal:      Comments: Soft, NT/ND, no masses, normal bowel sounds   Genitourinary:     Comments: No dodd  Musculoskeletal:      Right lower leg: No edema.      Left lower leg: No edema.   Right upper extremity amputation  Skin:     General: Skin is warm and dry.   Neurological:      General: No focal deficit present.      Mental Status: She is alert and oriented to person, place, and time.   Psychiatric:         Behavior: Behavior normal.        Meds    Current Facility-Administered Medications:     [Held by provider] apixaban (Eliquis) tablet 5 mg, 5 mg, oral, BID, AMARILYS Wilson-CNP, DNP, 5 mg at 05/03/24 2156    B complex-vitamin C-folic acid (Nephrocaps) capsule 1 capsule, 1 capsule, oral, Daily, Andi Bazan,  APRN-CNP, DNP, 1 capsule at 05/22/24 1013    ceftaroline (Teflaro) 200 mg in dextrose 5 % in water (D5W) 50 mL IV, 200 mg, intravenous, q8h, Sheldon Saleh MD, Last Rate: 0 mL/hr at 05/22/24 0358, 200 mg at 05/22/24 1122    cyclobenzaprine (Flexeril) tablet 5 mg, 5 mg, oral, TID PRN, El Estrada MD, 5 mg at 05/21/24 2358    DAPTOmycin (Cubicin) 700 mg/100 mL  mg, 700 mg, intravenous, q48h, Sheldon Saleh MD, Stopped at 05/20/24 1914    dextrose 50 % injection 12.5 g, 12.5 g, intravenous, q15 min PRN, Veronica De La Paz MD, 12.5 g at 05/20/24 1219    dextrose 50 % injection 25 g, 25 g, intravenous, q15 min PRN, Mike Santana MD, 25 g at 05/22/24 0955    epoetin tyson-epbx (Retacrit) injection 8,000 Units, 100 Units/kg, subcutaneous, Every Mon/Wed/Fri, Eber Bruce, DO, 8,000 Units at 05/20/24 1843    fentaNYL PF (Sublimaze) injection, , , PRN, Ronaldo Dempsey MD, 25 mcg at 05/13/24 1513    glucagon (Glucagen) injection 1 mg, 1 mg, intramuscular, q15 min PRN, Veronica De La Paz MD    heparin 1,000 unit/mL injection 3,100 Units, 3,100 Units, intra-catheter, After Dialysis, OSBALDO Wilson DNP, 3,100 Units at 05/20/24 1223    heparin 1,000 unit/mL injection 3,100 Units, 3,100 Units, intra-catheter, After Dialysis, OSBALDO Wilson DNP, 3,100 Units at 05/20/24 1223    heparin 1,000 unit/mL injection, , , PRN, Ronaldo Dempsey MD, 1,800 Units at 05/13/24 2329    insulin lispro (HumaLOG) injection 0-5 Units, 0-5 Units, subcutaneous, TID, Veronica De La Paz MD, 1 Units at 05/16/24 0956    levothyroxine (Synthroid, Levoxyl) tablet 125 mcg, 125 mcg, oral, Daily before breakfast, Andi Bazan, APRN-CNP, DNP, 125 mcg at 05/22/24 0605    lidocaine (Xylocaine) 20 mg/mL (2 %) injection, , , PRN, Ronaldo Dempsey MD, 5 mL at 05/13/24 1513    lidocaine 4 % patch 1 patch, 1 patch, transdermal, Daily, Michelle Drummond PA-C, 1 patch at 05/22/24 0422    lidocaine PF (Xylocaine) 10 mg/mL  (1 %) injection, , , PRN, Ronaldo Dempsey MD, 5 mL at 05/03/24 1816    loperamide (Imodium) capsule 2 mg, 2 mg, oral, q4h PRN, OSBALDO Wilson, LUCILA    midazolam (Versed) injection, , , PRN, Ronaldo Dempsey MD, 0.5 mg at 05/13/24 1513    nitroglycerin (Nitrostat) SL tablet 0.4 mg, 0.4 mg, sublingual, q5 min PRN, OSBALDO Wilson, LUCILA    ondansetron (Zofran) injection 4 mg, 4 mg, intravenous, q6h PRN, OSBALDO Wilson, LUCILA    oxyCODONE (Roxicodone) immediate release tablet 5 mg, 5 mg, oral, q4h PRN, OSBALDO Wilson, DNP, 5 mg at 05/22/24 1053    oxygen (O2) therapy, , , Continuous PRN, Ronaldo Dempsey MD, Last Rate: 120,000 mL/hr at 05/03/24 1758, 2 L/min at 05/03/24 1758    oxygen (O2) therapy, , inhalation, Continuous PRN - O2/gases, OSBALDO Rousseau    oxygen (O2) therapy, , inhalation, Continuous PRN - O2/gases, OSBALDO Rousseau    pantoprazole (ProtoNix) injection 40 mg, 40 mg, intravenous, BID, OSBALDO Marin, 40 mg at 05/22/24 1014    polyethylene glycol (Glycolax, Miralax) packet 17 g, 17 g, oral, Daily, OSBALDO Wilson, DNP, 17 g at 05/22/24 1014    sennosides (Senokot) tablet 8.6 mg, 8.6 mg, oral, BID, OSBALDO Wilson, DNP, 8.6 mg at 05/22/24 1029    sevelamer carbonate (Renvela) tablet 2,400 mg, 2,400 mg, oral, TID, OSBALDO Wilson, DNP, 2,400 mg at 05/21/24 1255    simethicone (Mylicon) chewable tablet 80 mg, 80 mg, oral, q8h PRN, OSBALDO Wilson, DNP    sodium zirconium cyclosilicate (Lokelma) packet 10 g, 10 g, oral, Daily, Eber Bruce DO, 10 g at 05/22/24 1029   Medications Discontinued During This Encounter   Medication Reason    loperamide (Imodium A-D) 2 mg tablet Med List Cleanup    piperacillin-tazobactam-dextrose (Zosyn) IV 2.25 g     piperacillin-tazobactam-dextrose (Zosyn) IV 2.25 g     heparin 1,000 unit/mL injection 3,100 Units     heparin  "1,000 unit/mL injection 3,100 Units     glucagon (Glucagen) injection 1 mg     dextrose 50 % injection 25 g     glucagon (Glucagen) injection 1 mg     dextrose 50 % injection 12.5 g     insulin lispro (HumaLOG) injection 0-5 Units     melatonin tablet 3 mg     insulin regular 100 unit/100 mL (1 unit/mL) in 0.9 % NaCl infusion     insulin regular (HumuLIN, NovoLIN) bolus from bag 2-6 Units     dextrose 5 % and lactated Ringer's infusion     vancomycin (Vancocin) pharmacy to dose - pharmacy monitoring Duplicate order    epoetin tyson-epbx (Retacrit) injection 8,000 Units Availability    insulin glargine (Lantus) injection 10 Units     epoetin tyson-epbx (Retacrit) injection 8,000 Units     norepinephrine (Levophed) 8 mg in dextrose 5% 250 mL (0.032 mg/mL) infusion (premix)     insulin glargine (Lantus) injection 5 Units     oxygen (O2) therapy     insulin glargine (Lantus) injection 4 Units     atorvastatin (Lipitor) tablet 40 mg     DAPTOmycin (Cubicin) 450 mg in sodium chloride 0.9% 50 mL IV     vancomycin (Vancocin) pharmacy to dose - pharmacy monitoring     sodium chloride 0.9% infusion     insulin glargine (Lantus) injection 2 Units         Allergies  Allergies   Allergen Reactions    Cashew Nut Anaphylaxis and Swelling     cashews        Last Recorded Vitals  Blood pressure 137/81, pulse 91, temperature 36.7 °C (98.1 °F), resp. rate 18, height 1.753 m (5' 9.02\"), weight 70.3 kg (155 lb), SpO2 92%.  Intake/Output last 3 Shifts:  No intake/output data recorded.    Last 24 hour Results  Results for orders placed or performed during the hospital encounter of 04/30/24 (from the past 24 hour(s))   POCT GLUCOSE   Result Value Ref Range    POCT Glucose 107 (H) 74 - 99 mg/dL   POCT GLUCOSE   Result Value Ref Range    POCT Glucose 127 (H) 74 - 99 mg/dL   POCT GLUCOSE   Result Value Ref Range    POCT Glucose 40 (L) 74 - 99 mg/dL   POCT GLUCOSE   Result Value Ref Range    POCT Glucose 36 (L) 74 - 99 mg/dL   POCT GLUCOSE "   Result Value Ref Range    POCT Glucose 104 (H) 74 - 99 mg/dL   POCT GLUCOSE   Result Value Ref Range    POCT Glucose 74 74 - 99 mg/dL        Imaging results  Electrocardiogram, 12-lead PRN ACS symptoms    Result Date: 5/17/2024  Atrial fibrillation with rapid ventricular response with premature ventricular or aberrantly conducted complexes Nonspecific ST and T wave abnormality , probably digitalis effect Abnormal ECG When compared with ECG of 16-JAN-2024 07:06, Atrial fibrillation has replaced Sinus rhythm ST elevation now present in Inferior leads Nonspecific T wave abnormality, worse in Lateral leads    IR CVC removal    Result Date: 5/16/2024  These images are not reportable by radiology and will not be interpreted by  Radiologists.    Transesophageal Echo (POLI)    Result Date: 5/14/2024   [facility] [address] [place] [address]              Tel [phone] and Fax [phone] TRANSESOPHAGEAL ECHOCARDIOGRAM REPORT  Patient Name:      XIOMARA CASSANDRA Maya Physician:    [id number] Edward Hernandez DO Study Date:        5/14/2024           Ordering Provider:    [id number] ALKA VIEYRA MRN/PID:           [id number]            Fellow: Accession#:        [id number]        Nurse:                Ronaldo Woo RN Date of Birth/Age: 1985 / 39      Sonographer:          Alejandro Carroll RDCS                    years Gender:            M                   Additional Staff:     Kayden Matthews CRNA Height:            175.26 cm           Admit Date:           4/30/2024 Weight:            72.58 kg            Admission Status:     Inpatient -                                                              Routine BSA / BMI:         1.88 m2 / 23.63     Encounter#:           [id number]                    kg/m2                                        Department Location:  [facility]  Non                                                              Invasive Blood Pressure: 116 /69 mmHg Study Type:    TRANSESOPHAGEAL ECHO (POLI) Diagnosis/ICD: Bacteremia-R78.81 Indication:    Staphyococcus aureus bacteremia, R/o endocarditis CPT Code:      POLI Complete-76564; Doppler Limited-03233; Color Doppler-00932 Patient History: Allergies:         Cashew nuts. Smoker:            Unknown. Diabetes:          Yes Insulin Pertinent History: HTN, Hyperlipidemia and PVD. 39 y.o. male presents for POLI                    for endocarditis rule out.                     PMH of ESRD and SIRS. Study Detail: The following Echo studies were performed: 2D. Agitated saline               used as a contrast agent for intraseptal flow evaluation. Total               contrast used for this procedure was 10 mL via IV push. Patient's               heart rhythm is sinus tachycardia. The patient was sedated.  PHYSICIAN INTERPRETATION: POLI Details: The POLI probe used was 5177. Technically adequate omniplane transesophageal echocardiogram performed. POLI Medication: The pharynx was anesthetized with Cetacaine spray and viscous lidocaine. The patient was sedated by Anesthesia; please refer to anesthesia flow sheet for medications used. POLI Procedure: The probe was passed without difficulty. Left Ventricle: The left ventricular systolic function is mildly decreased, with an estimated ejection fraction of 35-40%. Wall motion is abnormal. The left ventricular cavity size was not assessed. Left ventricular diastolic filling was not assessed. Left Atrium: The left atrium is normal in size. There is no definite left atrial thrombus present. A bubble study using agitated saline was performed. Bubble study is negative. There is a normal sized left atrial appendage. Right Ventricle: The right ventricle is normal in size. There is normal right ventricular global systolic function. Right Atrium: The right atrium is normal in size. Aortic Valve:  There is no visualized aortic valve vegetation. The aortic valve appears structurally normal. There is no evidence of aortic valve regurgitation. Mitral Valve: The mitral valve is normal in structure. There was no mitral valve vegetation visualized. There is trace mitral valve regurgitation. Tricuspid Valve: No vegetation is seen on the tricuspid valve. The tricuspid valve is structurally normal. There is trace tricuspid regurgitation. Pulmonic Valve: No pulmonic valve vegetation visualized. The pulmonic valve is structurally normal. There is no indication of pulmonic valve regurgitation. Pericardium: There is no pericardial effusion noted. Aorta: The aortic root is normal.  CONCLUSIONS:  1. Left ventricular systolic function is mildly decreased with a 35-40% estimated ejection fraction.  2. No mitral valve vegetation visualized.  3. No tricuspid valve vegetation.  4. No aortic valve vegetation visualized.  5. No pulmonic valve vegetation visualized.  6. No left atrial thrombus. QUANTITATIVE DATA SUMMARY:  [id number] Edward Hernandez  Electronically signed on 5/14/2024 at 7:06:59 PM  ** Final **     IR CVC tunneled    Result Date: 5/13/2024  Interpreted By:  Ronaldo Dempsey, STUDY: IR CVC TUNNELED; ;  5/13/2024 3:26 pm   ULTRASOUND FLUOROSCOPICALLY GUIDED RIGHT GROIN TEMPORARY HEMODIALYSIS CATHETER PLACEMENT   INDICATION: Signs/Symptoms:Temporary dialysis line today. 39-year-old man with end-stage renal disease, persistent bacteremia despite removal of groin tunneled hemodialysis catheter. Requires replacement of hemodialysis access.   COMPARISON: Fluoroscopic images from prior tunneled hemodialysis catheter exchange 05/13/2024   ACCESSION NUMBER(S): [id number]   ORDERING CLINICIAN: BRADEN QUIROGA   TECHNIQUE:   ATTENDING : Ronaldo Dempsey M.D.   TECHNICAL DESCRIPTION/FINDINGS: The procedure, including all risks, benefits and alternatives were explained to the patient in detail. All questions were  answered and written informed consent was obtained.   Patient was positioned supine on the fluoroscopy table. A time-out was performed.   The bilateral groins were prepped and draped in usual sterile fashion. Focused ultrasound was performed over the superior aspect of the right common femoral vein demonstrating superior patency with partial compressibility. Ultrasound images were saved. 1% lidocaine was administered subcutaneously for local anesthesia. Under real-time ultrasound guidance, the right common femoral vein was accessed in antegrade direction using micropuncture technique. Ultrasound images were saved. Under fluoroscopic visualization, an 018 guidewire was advanced into the right common iliac vein and micropuncture transitional introducer was utilized for placement of an 035 guidewire into the IVC. After serial dilation, a 13 Kazakh by 20 cm Trialysis catheter was then placed. A completion fluoroscopic image was obtained demonstrating the tip of the temporary hemodialysis catheter projecting over the IV C.   Catheter lumens easily aspirated and flushed. Large-bore dialysis lumens were locked with a concentrated heparinized solution (1000 units/cc). A small bore 3rd lumen was locked with a dilute heparinized solution. The catheter hub was sutured to the skin with 2 0 Prolene stay suture. Sterile dressing was applied.   SEDATION/MEDICATIONS: Continuous cardiopulmonary monitoring was performed by a radiology nurse for the duration of the procedure. 0.5 mg Versed and   25 mcg Fentanyl were administered for moderate conscious sedation time of 20 minutes.      10 cc 1% Lidocaine was administered subcutaneously for local anesthesia. SPECIMENS: None. ESTIMATED BLOOD LOSS:  5 cc FLOUROSCOPY:  0.1 minutes; DAP  2405 mGy-cm*2; Air Kerma  6.58 mGy CONTRAST: None.   FINDINGS: Test       Ultrasound fluoroscopically guided right groin temporary hemodialysis catheter. The catheter is ready for immediate use.     MACRO:  None   Signed by: Ronaldo Dempsey 5/13/2024 4:59 PM Dictation workstation:   [id number]    MR lumbar spine wo IV contrast    Result Date: 5/4/2024  Interpreted By:  Eva Og and Muddasani Dheeraj STUDY: MR LUMBAR SPINE WO IV CONTRAST   INDICATION: Signs/Symptoms:back pain new with bacteremia, possible infection   COMPARISON: CT of the lumbar spine 04/30/2024. Body CT 01/14/2024.   ACCESSION NUMBER(S): [id number]   ORDERING CLINICIAN: ALKA VIEYRA   TECHNIQUE: Sagittal T1, sagittal T2, sagittal STIR, axial T1 and axial T2 weighted MRI images of the lumbar spine were obtained without intravenous contrast administration.   FINDINGS: Image quality is somewhat degraded due to motion and technique.   There are 5 non-rib-bearing lumbar-type vertebral bodies. The last well-formed intervertebral disc is labeled L5-S1.   Minimal retrolisthesis at L5-S1. Alignment is otherwise normal.   Vertebral body heights are maintained. Disc spaces are relatively maintained.   There is diffusely hypointense bone marrow signal on the T1 weighted images which may be related to renal osteodystrophy. Differential considerations include anemia, reactive marrow or bone marrow infiltrating process.   There is no increased STIR signal to suggest marrow edema. Multilevel degenerative endplate changes include prominent Schmorl's nodes most pronounced at L3-4 where there are Modic type 2 changes and central intradiscal T1 and T2 hypointense signal likely degenerative.   The conus medullaris terminates at L1-2. The visualized spinal cord, conus medullaris and cauda equina demonstrate normal signal and morphology.   Fatty atrophic changes involve the paravertebral musculature. Partially visualized kidneys demonstrate atrophic changes with multiple bilateral cysts, better characterized on previous body CT dated 01/14/2024. Partially visualized catheter within the inferior vena cava and right common iliac vein.   T10-11 on sagittal images  only: No spinal canal or neural foraminal stenosis.   T11-12 on sagittal images only: No spinal canal or neural foraminal stenosis.   T12-L1: Facet arthropathy and ligamentum flavum thickening. No spinal canal or neural foraminal stenosis.   L1-2: Facet arthropathy and ligamentum flavum thickening. No spinal canal or neural foraminal stenosis.   L2-3: Facet arthropathy and ligamentum flavum thickening. No spinal canal or neural foraminal stenosis.   L3-4: Disc bulge, facet arthropathy and ligamentum flavum thickening. No spinal canal or neural foraminal stenosis.   L4-5: Disc bulge with small central protrusion, facet arthropathy and ligamentum flavum thickening. No spinal canal stenosis. Mild bilateral neural foraminal stenosis.   L5-S1: Disc bulge, facet arthropathy and ligamentum flavum thickening. No spinal canal stenosis. Mild right and no left neural foraminal stenosis.   Degenerative changes are noted at the sacroiliac joints.       Diffusely hypointense bone marrow signal on the T1 weighted images may be related to renal osteodystrophy. Differential considerations include anemia, reactive bone marrow or bone marrow infiltrating process.   No definite imaging findings to suggest discitis osteomyelitis on the current exam.   Degenerative changes without significant spinal canal stenosis. Varying degrees of mild neural foraminal narrowing as detailed above.   I personally reviewed the images/study and I agree with the findings as stated by Dr. Lowe.   MACRO: None   Signed by: Eva Og 5/4/2024 11:11 AM Dictation workstation:   [id number]    IR CVC exchange    Result Date: 5/3/2024  Interpreted By:  Ronaldo Dempsey, STUDY: IR CVC EXCHANGE; ;  5/3/2024 6:28 pm   FLUOROSCOPICALLY GUIDED EXCHANGE OF RIGHT GROIN TUNNELED HEMODIALYSIS CATHETER   INDICATION: Signs/Symptoms:Bacteremia- Right fem TDC exchange. 39-year-old man with end-stage renal disease, central thoracic venous occlusion, end-stage vascular  access with bacteremia. Guidewire exchange of right groin tunneled hemodialysis catheter in an attempt to salvage the access.   COMPARISON: Chest radiograph 04/30/2024, fluoroscopic images from prior tunneled hemodialysis catheter placement 01/22/2024   ACCESSION NUMBER(S): [id number]   ORDERING CLINICIAN: SACHI MORTENSEN   TECHNIQUE:   ATTENDING : Ronaldo Dempsey M.D.   TECHNICAL DESCRIPTION/FINDINGS: The procedure, including all risks, benefits and alternatives were explained to the patient in detail. All questions were answered and written informed consent was obtained.   The patient was positioned supine on the angiography table. A time-out was performed.   The right groin was prepped and draped in usual sterile fashion. A  fluoroscopic image was obtained demonstrating the tunneled hemodialysis catheter terminating at the inferior cavoatrial junction.   1% lidocaine was administered via the subcutaneous tunnel tract for local anesthesia. Blunt dissection was performed to free the subcutaneous catheter cuff. An 035 stiff Glidewire was then advanced through the catheter into the right atrium. The old catheter was removed. Over the guidewire, a new 14.5 Japanese by 42 cm tip to cuff dual-lumen hemodialysis catheter was then placed. A completion fluoroscopic image demonstrates the catheter in the inferior right atrium.   Catheter lumens easily aspirated and flushed and were locked with a concentrated heparinized solution (1000 units/cc). The catheter hub was then sutured to the skin with 2 0 Prolene stay suture. Because of losing along the tract, Gel-Foam pledgets were then administered subcutaneously near where the catheter enters the skin for additional hemostasis, followed by a pursestring suture. A sterile dressing was applied.   SEDATION/MEDICATIONS: Continuous cardiopulmonary monitoring was performed by a radiology nurse for the duration of the procedure.  1 mg Versed and   50 mcg Fentanyl  were administered for moderate conscious sedation time of 20 minutes.      10 cc 1% Lidocaine was administered subcutaneously for local anesthesia. SPECIMENS: None. ESTIMATED BLOOD LOSS:  5 cc FLOUROSCOPY:  1.2 minutes; DAP  63950 mGy-cm*2; Air Kerma  59.07 mGy CONTRAST: None.   FINDINGS: Test       Fluoroscopically guidewire exchange of right groin tunneled hemodialysis catheter. The newly exchanged catheter is ready for immediate use.     MACRO: None   Signed by: Ronaldo Dempsey 5/3/2024 8:09 PM Dictation workstation:   [id number]    Transthoracic Echo Complete    Result Date: 5/3/2024   [facility] [address] [place] [address]              Tel [phone] and Fax [phone] TRANSTHORACIC ECHOCARDIOGRAM REPORT  Patient Name:      XIOMARA Maya Physician:    [id number] Brandon Nur DO Study Date:        5/3/2024             Ordering Provider:    [id number] ALKA VIEYRA MRN/PID:           [id number]             Fellow: Accession#:        [id number]         Nurse: Date of Birth/Age: 1985 / 39 years Sonographer:          Sy Lyons [facility] Gender:            M                    Additional Staff: Height:            175.00 cm            Admit Date: Weight:            65.80 kg             Admission Status:     Inpatient -                                                               Routine BSA / BMI:         1.80 m2 / 21.49      Encounter#:           [id number]                    kg/m2                                         Department Location:  [facility] Non                                                               Invasive Blood Pressure: 112 /74 mmHg Study Type:    TRANSTHORACIC ECHO (TTE) COMPLETE Diagnosis/ICD: Endocarditis, valve unspecified-I38 Indication:    endocarditis CPT Code:      Echo Complete w Full Doppler-28717 Patient History: Pertinent History: HTN and Hyperlipidemia. Study Detail: The  following Echo studies were performed: M-Mode, Doppler, color               flow and 2D.  PHYSICIAN INTERPRETATION: Left Ventricle: The left ventricular systolic function is moderately decreased, with an estimated ejection fraction of 35-40%. There are no regional wall motion abnormalities. The left ventricular cavity size is normal. Abnormal (paradoxical) septal motion, consistent with left bundle branch block. Spectral Doppler shows an impaired relaxation pattern of left ventricular diastolic filling. Left Atrium: The left atrium is normal in size. Right Ventricle: The right ventricle is normal in size. There is normal right ventricular global systolic function. Right Atrium: The right atrium is normal in size. Aortic Valve: The aortic valve is probably trileaflet. There is no evidence of aortic valve regurgitation. Mitral Valve: The mitral valve is mildly thickened. There is trace mitral valve regurgitation. Tricuspid Valve: The tricuspid valve is structurally normal. No evidence of tricuspid regurgitation. Pulmonic Valve: The pulmonic valve is structurally normal. There is no indication of pulmonic valve regurgitation. Pericardium: There is no pericardial effusion noted. Aorta: The aortic root is normal. In comparison to the previous echocardiogram(s): Compared with study from 1/16/2024, LV function has declined. Was previously normal.  CONCLUSIONS:  1. Left ventricular systolic function is moderately decreased with a 35-40% estimated ejection fraction.  2. Abnormal septal motion consistent with left bundle branch block.  3. Spectral Doppler shows an impaired relaxation pattern of left ventricular diastolic filling.  4. No vegetations visualized within the limitations of this study.  5. Compared with study from 1/16/2024, LV function has declined. Was previously normal. QUANTITATIVE DATA SUMMARY: LA VOLUME:                               Normal Ranges: LA Vol A4C:        40.1 ml    (22+/-6mL/m2) LA Vol A2C:         35.3 ml LA Vol BP:         40.9 ml LA Vol Index A4C:  22.3ml/m2 LA Vol Index A2C:  19.6 ml/m2 LA Vol Index BP:   22.7 ml/m2 LA Area A4C:       15.3 cm2 LA Area A2C:       13.2 cm2 LA Major Axis A4C: 5.0 cm LA Major Axis A2C: 4.2 cm LA Volume Index:   22.7 ml/m2  [id number] Brandon Nur DO Electronically signed on 5/3/2024 at 4:24:04 PM  ** Final **     Lower extremity venous duplex right    Result Date: 5/1/2024             [place] [address]   Tel [phone] and Fax [phone]  Vascular Lab Report VASC US LOWER EXTREMITY VENOUS DUPLEX RIGHT  Patient Name:      XIOMARA Maya Physician:  [id number] Rayo Jauregui MD, RPVI Study Date:        4/30/2024            Ordering Physician: [id number] DEEP PANIAGUA MRN/PID:           [id number]             Technologist:       Megan Shay RVT Accession#:        [id number]         Technologist 2: Date of Birth/Age: 1985 / 39 years Encounter#:         [id number] Gender:            M Admission Status:  Emergency            Location Performed: [facility]  Diagnosis/ICD: Pain in right leg-M79.604 CPT Codes:     55596 Peripheral venous duplex scan for DVT Limited  **CRITICAL RESULT** Critical Result: Acute DVT in the right leg. Notification called to Deep Paniagua PA-C on 4/30/2024 at 10:52:21 AM by Megan Shay RDMS, RVT.  CONCLUSIONS: Right Lower Venous: There is acute non-occlusive deep vein thrombosis visualized in the common femoral vein. There are chronic changes visualized in the popliteal vein. Enlarged lymph node noted in the right groin. Waveforms suggestive of more distal obstruction/thrombus. May wish further means of imaging evaluation. Left Lower Venous: There are chronic changes visualized in the common femoral vein.  Comparison: Compared with study from 7/27/2023, New finding of acute non-occlusive thrombus in the right CFV.  Imaging & Doppler  Findings:  Right                 Compressible      Thrombus          Flow Distal External Iliac                     None CFV                     Partial    Acute non-occlusive Continuous PFV                       Yes             None FV Proximal               Yes             None         Continuous FV Mid                    Yes             None FV Distal                 Yes             None Popliteal                 Yes            Chronic       Continuous Peroneal                  Yes             None PTV                       Yes             None  Left Compress Thrombus        Flow CFV    Yes    Chronic  Spontaneous/Phasic  [id number] Rayo Jauregui MD, RPVI Electronically signed by [id number] MARIANO Call MD on 5/1/2024 at 9:13:07 AM  ** Final **     XR chest 1 view    Result Date: 4/30/2024  STUDY: Chest Radiograph;  4/30/2024 at 2:42 PM. INDICATION: Fever. COMPARISON: XR chest 1/14/2024, 11/10/2023; CTA chest 2/15/2023. ACCESSION NUMBER(S): [id number] ORDERING CLINICIAN: WENDY INFANTE TECHNIQUE:  Frontal chest was obtained at 14:42 hours. FINDINGS: CARDIOMEDIASTINAL SILHOUETTE: Cardiomediastinal silhouette is normal in size and configuration.  LUNGS: Patchy right basilar infiltrate or atelectasis with small right pleural effusion. Left basilar scar versus atelectasis. ABDOMEN: No remarkable upper abdominal findings.  BONES: No acute osseous changes.    Patchy right basilar infiltrate or atelectasis with small right pleural effusion. Signed by Joseph Overton MD    CT thoracic spine wo IV contrast    Result Date: 4/30/2024  STUDY: CT Thoracic Spine and Lumbar Spine without IV Contrast; 4/30/2024 11:11 AM INDICATION: Midline back pain. COMPARISON: None Available. ACCESSION NUMBER(S): [id number], [id number] ORDERING CLINICIAN: RAJNI FARMER TECHNIQUE:  CT of the thoracic spine and lumbar spine was performed without intravenous or intrathecal contrast.  Sagittal and coronal reconstructions were generated.  Automated  mA/kV exposure control was utilized and patient examination was performed in strict accordance with principles of ALARA. FINDINGS: Portions of the T1 vertebral body, as well as a small portion of the anterior superior aspect of T2 is cut off on this study.  No compression deformities are visualized within the thoracic vertebral bodies from T2 through T12.  No spondylolisthesis is noted.  No pedicular defects are noted.  No prominent edema is appreciated within the adjacent posterior paravertebral musculature.  Evaluation of the thecal sac is limited due to lack of myelographic contrast.  There are enlarged lymph nodes seen within the visualized portions of the mediastinum.  There are bilateral lower lobe infiltrates with tiny bilateral effusions.  Coronary artery calcifications are present. No compression deformities are visualized within the lumbar spine.  No pars defects are noted.  No significant spondylolisthesis is seen.  No prominent edema is appreciated within the visualized portions of the psoas musculature.  A long dialysis catheter is visualized within the inferior vena cava.  Evaluation of the thecal sac is limited due to lack of myelographic contrast.  There is disc bulging seen from L3 through S1.  No significant impingement is appreciated upon the neural foramina on the sagittal reconstructions.    Thoracic spine: 1.  Portions of T1 and T2 cough on this study. 2.  No acute osseous findings seen from T3 through T12. 3.  Tiny bilateral effusions with adjacent lower lobe infiltrates. 4.  The distal adenopathy. 5.  Coronary artery calcifications. Lumbar spine: 1.  No compression deformities or spinal listhesis is noted. 2.  Disc bulging from L3 to S1.  Evaluation for spinal stenosis is limited due to lack mammographic contrast. 3.  Incidental note of a long dialysis catheter within the inferior vena cava. Signed by Deejay Hoffman MD    CT lumbar spine wo IV contrast    Result Date: 4/30/2024  STUDY: CT  Thoracic Spine and Lumbar Spine without IV Contrast; 4/30/2024 11:11 AM INDICATION: Midline back pain. COMPARISON: None Available. ACCESSION NUMBER(S): [id number], [id number] ORDERING CLINICIAN: RAJNI FARMER TECHNIQUE:  CT of the thoracic spine and lumbar spine was performed without intravenous or intrathecal contrast.  Sagittal and coronal reconstructions were generated.  Automated mA/kV exposure control was utilized and patient examination was performed in strict accordance with principles of ALARA. FINDINGS: Portions of the T1 vertebral body, as well as a small portion of the anterior superior aspect of T2 is cut off on this study.  No compression deformities are visualized within the thoracic vertebral bodies from T2 through T12.  No spondylolisthesis is noted.  No pedicular defects are noted.  No prominent edema is appreciated within the adjacent posterior paravertebral musculature.  Evaluation of the thecal sac is limited due to lack of myelographic contrast.  There are enlarged lymph nodes seen within the visualized portions of the mediastinum.  There are bilateral lower lobe infiltrates with tiny bilateral effusions.  Coronary artery calcifications are present. No compression deformities are visualized within the lumbar spine.  No pars defects are noted.  No significant spondylolisthesis is seen.  No prominent edema is appreciated within the visualized portions of the psoas musculature.  A long dialysis catheter is visualized within the inferior vena cava.  Evaluation of the thecal sac is limited due to lack of myelographic contrast.  There is disc bulging seen from L3 through S1.  No significant impingement is appreciated upon the neural foramina on the sagittal reconstructions.    Thoracic spine: 1.  Portions of T1 and T2 cough on this study. 2.  No acute osseous findings seen from T3 through T12. 3.  Tiny bilateral effusions with adjacent lower lobe infiltrates. 4.  The distal adenopathy. 5.  Coronary  artery calcifications. Lumbar spine: 1.  No compression deformities or spinal listhesis is noted. 2.  Disc bulging from L3 to S1.  Evaluation for spinal stenosis is limited due to lack mammographic contrast. 3.  Incidental note of a long dialysis catheter within the inferior vena cava. Signed by Deejay Hoffman MD    XR pelvis 1-2 views    Result Date: 4/30/2024  STUDY: Pelvis Radiographs; 4/30/2024 at 9:06 AM. INDICATION: Pain. COMPARISON: CT pelvis 7/17/2023. ACCESSION NUMBER(S): [id number] ORDERING CLINICIAN: RAJNI FARMER TECHNIQUE:  One view(s) of the pelvis. FINDINGS:  The pelvic ring is intact.  There is no acute fracture.  Severe underlying osteopenia.    Slightly limited secondary to underlying osteopenia.  No acute osseous abnormality. Signed by Roe Ruiz MD       Assessment and Plan    Edwar Hemphill is a 38 y.o. male with a past medical history of end-stage renal disease on hemodialysis via right femoral TDC who resides at [facility]. 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  He later had stump debridement on 7/27 with wound VAC placement.  He had polymicrobial infection including resistant E coli, Acinetobacter baumannii and Enterobacter fecalis. He was admitted last September with positive blood cultures growing Acinetobacter baumannii and Stenotrophomonas. He went to IR for dialysis line exchange.  He then was admitted again in November where CT noted left sided fluid collection concerning for abscess.  He grew MRSA and Streptococcus, catheter was exchanged on November 13, he completed vancomycin. He then had Staph aureus CLABSI again in January of this.  POLI  negative for endocarditis. HD cath removed 1/16 and replaced on 1/19 to the L groin. IV Vancomycin 750mg with HD through 2/14/24.      He comes in now with fever with low back pain radiating down the right leg. Blood cultures were obtained and came back growing MRSA. Infectious disease is following and managing antimicrobial coverage. He remains bacteremic with MRSA despite guidewire exchange of the groin tunneled HD catheter on 5/3. MRI of the lumbar spine did not show osteomyelitis/discitis and 2 D ECHO was negative for a vegetation. We dialyzed him on Wednesday. We removed his line all together for a line holiday on 5/9.     We dialyzed him Monday via a right femoral temporary dialysis line then removed it. Repeat cultures from 5/21 are pending. I do not have labs back from today. He is getting the daily Lokelma therefore potassium would be a less likely issue. I will ensure that we have labs back while on a line holiday. There is no acute indication for dialysis. Antimicrobial coverage with daptomycin and Teflaro noted. ID is following. Monitoring him closely while on a line holiday.       I spent a total of 40 minutes with this patient today.    Eber Bruce, DO

## 2024-05-22 NOTE — CARE PLAN
The patient's goals for the shift include      The clinical goals for the shift include Pt pain will remain tolerable throughout this shift    Over the shift, the patient did not make progress toward the following goals.

## 2024-05-22 NOTE — PROGRESS NOTES
"Edwar Hemphill is a 39 y.o. male on day 22 of admission presenting with SIRS (systemic inflammatory response syndrome) (Multi).    Subjective   No new complaints     Scheduled medications  [Held by provider] apixaban, 5 mg, oral, BID  B complex-vitamin C-folic acid, 1 capsule, oral, Daily  ceftaroline, 200 mg, intravenous, q8h  DAPTOmycin, 700 mg, intravenous, q48h  epoetin tyson or biosimilar, 100 Units/kg, subcutaneous, Every Mon/Wed/Fri  heparin, 3,100 Units, intra-catheter, After Dialysis  heparin, 3,100 Units, intra-catheter, After Dialysis  insulin glargine, 2 Units, subcutaneous, Nightly  insulin lispro, 0-5 Units, subcutaneous, TID  levothyroxine, 125 mcg, oral, Daily before breakfast  lidocaine, 1 patch, transdermal, Daily  pantoprazole, 40 mg, intravenous, BID  polyethylene glycol, 17 g, oral, Daily  sennosides, 8.6 mg, oral, BID  sevelamer carbonate, 2,400 mg, oral, TID  sodium zirconium cyclosilicate, 10 g, oral, Daily      Continuous medications  oxygen, , Last Rate: 2 L/min (05/03/24 1758)      Objective   Physical Exam  Constitutional:       General: He is not in acute distress.  Neurological:      Mental Status: He is alert.         Last Recorded Vitals  Blood pressure 118/72, pulse 89, temperature 37.2 °C (99 °F), temperature source Oral, resp. rate 18, height 1.753 m (5' 9.02\"), weight 70.3 kg (155 lb), SpO2 94%.  Intake/Output last 3 Shifts:  No intake/output data recorded.    Relevant Results  Results from last 7 days   Lab Units 05/21/24  1931 05/21/24  1553 05/21/24  1208 05/21/24  1129 05/21/24  0832 05/20/24  2059 05/20/24  1146 05/20/24  1126 05/19/24  0754 05/19/24  0619 05/17/24  2134 05/17/24  1521   POCT GLUCOSE mg/dL 127* 107* 101*  --  82 116*   < >  --    < >  --    < >  --    GLUCOSE mg/dL  --   --   --  102*  --   --   --  68*  --  124*  --  108*    < > = values in this interval not displayed.       Assessment/Plan   Principal Problem:    SIRS (systemic inflammatory response syndrome) " (Multi)      IMPRESSION  DIABETIC KETOACIDOSIS, RESOLVED  TYPE 2 DIABETES MELLITUS  LONG TERM CURRENT INSULIN USE  Glucose well controlled on glargine 2 units at bedtime     RECOMMENDATIONS  Recomend insulin glargine 2 units at bedtime, for now  Insulin lispro scale QAC      Mike Santana MD

## 2024-05-22 NOTE — PROGRESS NOTES
"  INFECTIOUS DISEASE DAILY PROGRESS NOTE    SUBJECTIVE:    Remains afebrile. Blood cx finally seem to be clearing now.    OBJECTIVE:  VITALS (Last 24 Hours)  /90 (Patient Position: Lying)   Pulse 84   Temp 36.8 °C (98.2 °F) (Oral)   Resp 18   Ht 1.753 m (5' 9.02\")   Wt 70.3 kg (155 lb)   SpO2 97%   BMI 22.88 kg/m²     PHYSICAL EXAM:  Gen - laying in bed  Abd - soft, no tenderness, BS present  RLE - s/p AKA  RUE - s/p amputation  Skin - no rash     ABX: IV Daptomycin and Ceftaroline    LABS:  Lab Results   Component Value Date    WBC 5.0 05/21/2024    HGB 7.2 (L) 05/21/2024    HCT 23.6 (L) 05/21/2024    MCV 90 05/21/2024     05/21/2024     Lab Results   Component Value Date    GLUCOSE 102 (H) 05/21/2024    CALCIUM 8.6 05/21/2024     (L) 05/21/2024    K 4.0 05/21/2024    CO2 25 05/21/2024    CL 98 05/21/2024    BUN 14 05/21/2024    CREATININE 4.69 (H) 05/21/2024     Results from last 72 hours   Lab Units 05/21/24  1129   ALBUMIN g/dL 2.3*     Estimated Creatinine Clearance: 20.8 mL/min (A) (by C-G formula based on SCr of 4.69 mg/dL (H)).    ASSESSMENT/PLAN:     CLABSI (HD cath POA) due to MRSA - line exchanged 5/3. Not having a full line holiday because of risk of losing HD access. TTE without endocarditis. Repeat blood cx 5/5 and 5/7 still positive. HD cath removed on 5/9. 5/10 blood cx still positive. New temp HD cath placed 5/13. POLI negative for endocarditis 5/14. Blood cx 5/14 positive. Dapto/Ceftaroline started 5/16 and then 5/17 blood cx still positive. Temp HD cath removed 5/20.  Lumbar Spine Pain - CT without epidural abscess. MRI without OM/discitis.  DM II with DKA - DKA resolved     Combination abx therapy Daptomycin/Ceftaroline still.    Blood cx x2 5/20 NGTD. Hopefully these are negative tomorrow still. If so we will have to figure out where to place new HD cath. The other issue is although Dapto can be done with HD, Ceftaroline cannot. I am not certain how we will be able to " gain other IV access for this for a longer course.     Monitoring for adverse effects of abx such as rash/itching/diarrhea - none apparent.     Will follow. Thanks!    Mat Jauregui MD  [facility]  Office #[phone]

## 2024-05-22 NOTE — CARE PLAN
The patient's goals for the shift include      The clinical goals for the shift include Pt pain will remain tolerable throughout this shift    Over the shift, the patient did make progress toward the following goals.

## 2024-05-23 LAB
ALBUMIN SERPL BCP-MCNC: 2.1 G/DL (ref 3.4–5)
ANION GAP SERPL CALC-SCNC: 15 MMOL/L (ref 10–20)
BUN SERPL-MCNC: 19 MG/DL (ref 6–23)
CALCIUM SERPL-MCNC: 7.9 MG/DL (ref 8.6–10.3)
CHLORIDE SERPL-SCNC: 97 MMOL/L (ref 98–107)
CK SERPL-CCNC: 52 U/L (ref 0–325)
CO2 SERPL-SCNC: 26 MMOL/L (ref 21–32)
CREAT SERPL-MCNC: 6.27 MG/DL (ref 0.5–1.3)
EGFRCR SERPLBLD CKD-EPI 2021: 11 ML/MIN/1.73M*2
GLUCOSE BLD MANUAL STRIP-MCNC: 114 MG/DL (ref 74–99)
GLUCOSE BLD MANUAL STRIP-MCNC: 136 MG/DL (ref 74–99)
GLUCOSE BLD MANUAL STRIP-MCNC: 195 MG/DL (ref 74–99)
GLUCOSE BLD MANUAL STRIP-MCNC: 47 MG/DL (ref 74–99)
GLUCOSE BLD MANUAL STRIP-MCNC: 79 MG/DL (ref 74–99)
GLUCOSE SERPL-MCNC: 48 MG/DL (ref 74–99)
HOLD SPECIMEN: NORMAL
PHOSPHATE SERPL-MCNC: 5.2 MG/DL (ref 2.5–4.9)
POTASSIUM SERPL-SCNC: 4.1 MMOL/L (ref 3.5–5.3)
SODIUM SERPL-SCNC: 134 MMOL/L (ref 136–145)

## 2024-05-23 PROCEDURE — 2500000004 HC RX 250 GENERAL PHARMACY W/ HCPCS (ALT 636 FOR OP/ED): Performed by: INTERNAL MEDICINE

## 2024-05-23 PROCEDURE — 82550 ASSAY OF CK (CPK): CPT | Performed by: PHARMACIST

## 2024-05-23 PROCEDURE — 2500000005 HC RX 250 GENERAL PHARMACY W/O HCPCS: Performed by: SURGERY

## 2024-05-23 PROCEDURE — 80069 RENAL FUNCTION PANEL: CPT | Performed by: INTERNAL MEDICINE

## 2024-05-23 PROCEDURE — 2500000006 HC RX 250 W HCPCS SELF ADMINISTERED DRUGS (ALT 637 FOR ALL PAYERS): Performed by: NURSE PRACTITIONER

## 2024-05-23 PROCEDURE — 2500000002 HC RX 250 W HCPCS SELF ADMINISTERED DRUGS (ALT 637 FOR MEDICARE OP, ALT 636 FOR OP/ED): Performed by: NURSE PRACTITIONER

## 2024-05-23 PROCEDURE — 82947 ASSAY GLUCOSE BLOOD QUANT: CPT

## 2024-05-23 PROCEDURE — 36415 COLL VENOUS BLD VENIPUNCTURE: CPT | Performed by: PHARMACIST

## 2024-05-23 PROCEDURE — 2500000001 HC RX 250 WO HCPCS SELF ADMINISTERED DRUGS (ALT 637 FOR MEDICARE OP): Performed by: NURSE PRACTITIONER

## 2024-05-23 PROCEDURE — 2500000005 HC RX 250 GENERAL PHARMACY W/O HCPCS: Performed by: PHYSICIAN ASSISTANT

## 2024-05-23 PROCEDURE — 1200000002 HC GENERAL ROOM WITH TELEMETRY DAILY

## 2024-05-23 PROCEDURE — C9113 INJ PANTOPRAZOLE SODIUM, VIA: HCPCS

## 2024-05-23 PROCEDURE — 2500000004 HC RX 250 GENERAL PHARMACY W/ HCPCS (ALT 636 FOR OP/ED)

## 2024-05-23 PROCEDURE — 2500000006 HC RX 250 W HCPCS SELF ADMINISTERED DRUGS (ALT 637 FOR ALL PAYERS): Mod: MUE | Performed by: INTERNAL MEDICINE

## 2024-05-23 RX ADMIN — SODIUM ZIRCONIUM CYCLOSILICATE 10 G: 10 POWDER, FOR SUSPENSION ORAL at 08:39

## 2024-05-23 RX ADMIN — CEFTAROLINE FOSAMIL 200 MG: 600 POWDER, FOR SOLUTION INTRAVENOUS at 20:32

## 2024-05-23 RX ADMIN — NEPHROCAP 1 CAPSULE: 1 CAP ORAL at 08:39

## 2024-05-23 RX ADMIN — PANTOPRAZOLE SODIUM 40 MG: 40 INJECTION, POWDER, FOR SOLUTION INTRAVENOUS at 08:39

## 2024-05-23 RX ADMIN — OXYCODONE HYDROCHLORIDE 5 MG: 5 TABLET ORAL at 08:55

## 2024-05-23 RX ADMIN — CEFTAROLINE FOSAMIL 200 MG: 600 POWDER, FOR SOLUTION INTRAVENOUS at 02:52

## 2024-05-23 RX ADMIN — SEVELAMER CARBONATE 2400 MG: 800 TABLET, FILM COATED ORAL at 17:51

## 2024-05-23 RX ADMIN — LEVOTHYROXINE SODIUM 125 MCG: 125 TABLET ORAL at 06:05

## 2024-05-23 RX ADMIN — LIDOCAINE 4% 1 PATCH: 40 PATCH TOPICAL at 08:39

## 2024-05-23 RX ADMIN — PANTOPRAZOLE SODIUM 40 MG: 40 INJECTION, POWDER, FOR SOLUTION INTRAVENOUS at 20:32

## 2024-05-23 RX ADMIN — CEFTAROLINE FOSAMIL 200 MG: 600 POWDER, FOR SOLUTION INTRAVENOUS at 13:20

## 2024-05-23 RX ADMIN — DEXTROSE MONOHYDRATE 12.5 G: 25 INJECTION, SOLUTION INTRAVENOUS at 08:22

## 2024-05-23 RX ADMIN — SEVELAMER CARBONATE 2400 MG: 800 TABLET, FILM COATED ORAL at 12:41

## 2024-05-23 ASSESSMENT — COGNITIVE AND FUNCTIONAL STATUS - GENERAL
DRESSING REGULAR UPPER BODY CLOTHING: A LOT
TOILETING: A LOT
DAILY ACTIVITIY SCORE: 15
MOBILITY SCORE: 10
TOILETING: A LOT
DRESSING REGULAR UPPER BODY CLOTHING: A LOT
CLIMB 3 TO 5 STEPS WITH RAILING: TOTAL
MOVING FROM LYING ON BACK TO SITTING ON SIDE OF FLAT BED WITH BEDRAILS: A LITTLE
WALKING IN HOSPITAL ROOM: TOTAL
TURNING FROM BACK TO SIDE WHILE IN FLAT BAD: A LITTLE
MOVING TO AND FROM BED TO CHAIR: TOTAL
TURNING FROM BACK TO SIDE WHILE IN FLAT BAD: A LITTLE
STANDING UP FROM CHAIR USING ARMS: TOTAL
MOVING TO AND FROM BED TO CHAIR: TOTAL
WALKING IN HOSPITAL ROOM: TOTAL
HELP NEEDED FOR BATHING: A LOT
DRESSING REGULAR LOWER BODY CLOTHING: A LOT
DRESSING REGULAR LOWER BODY CLOTHING: A LOT
MOVING FROM LYING ON BACK TO SITTING ON SIDE OF FLAT BED WITH BEDRAILS: A LITTLE
PERSONAL GROOMING: A LITTLE
PERSONAL GROOMING: A LITTLE
HELP NEEDED FOR BATHING: A LOT
MOBILITY SCORE: 10
CLIMB 3 TO 5 STEPS WITH RAILING: TOTAL
DAILY ACTIVITIY SCORE: 15
STANDING UP FROM CHAIR USING ARMS: TOTAL

## 2024-05-23 ASSESSMENT — PAIN SCALES - GENERAL
PAINLEVEL_OUTOF10: 0 - NO PAIN
PAINLEVEL_OUTOF10: 7
PAINLEVEL_OUTOF10: 0 - NO PAIN

## 2024-05-23 ASSESSMENT — PAIN - FUNCTIONAL ASSESSMENT
PAIN_FUNCTIONAL_ASSESSMENT: 0-10

## 2024-05-23 ASSESSMENT — PAIN DESCRIPTION - LOCATION: LOCATION: BACK

## 2024-05-23 NOTE — PROGRESS NOTES
"  INFECTIOUS DISEASE DAILY PROGRESS NOTE    SUBJECTIVE:    No overnight events. No new complaints. Afebrile. No rash/itching/diarrhea.    OBJECTIVE:  VITALS (Last 24 Hours)  /74 (BP Location: Left arm, Patient Position: Lying)   Pulse 83   Temp 36.4 °C (97.6 °F) (Oral)   Resp 18   Ht 1.753 m (5' 9.02\")   Wt 68.5 kg (151 lb)   SpO2 95%   BMI 22.29 kg/m²     PHYSICAL EXAM:  Gen - laying in bed  Abd - soft, no tenderness, BS present  RLE - s/p AKA  RUE - s/p amputation  Skin - no rash     ABX: IV Daptomycin and Ceftaroline    LABS:  Lab Results   Component Value Date    WBC 5.0 05/21/2024    HGB 7.2 (L) 05/21/2024    HCT 23.6 (L) 05/21/2024    MCV 90 05/21/2024     05/21/2024     Lab Results   Component Value Date    GLUCOSE 48 (LL) 05/23/2024    CALCIUM 7.9 (L) 05/23/2024     (L) 05/23/2024    K 4.1 05/23/2024    CO2 26 05/23/2024    CL 97 (L) 05/23/2024    BUN 19 05/23/2024    CREATININE 6.27 (H) 05/23/2024     Results from last 72 hours   Lab Units 05/23/24  0611   ALBUMIN g/dL 2.1*     Estimated Creatinine Clearance: 15.2 mL/min (A) (by C-G formula based on SCr of 6.27 mg/dL (H)).    ASSESSMENT/PLAN:     CLABSI (HD cath POA) due to MRSA - line exchanged 5/3. Not having a full line holiday because of risk of losing HD access. TTE without endocarditis. Repeat blood cx 5/5 and 5/7 still positive. HD cath removed on 5/9. 5/10 blood cx still positive. New temp HD cath placed 5/13. POLI negative for endocarditis 5/14. Blood cx 5/14 positive. Dapto/Ceftaroline started 5/16 and then 5/17 blood cx still positive. Temp HD cath removed 5/20.  Lumbar Spine Pain - CT without epidural abscess. MRI without OM/discitis.  DM II with DKA - DKA resolved     Combination abx therapy Daptomycin/Ceftaroline still.    Blood cx x2 5/20 NGTD. Can have tunneled HD cath tomorrow if these stay clear. Outstanding issue is although Dapto can be done with HD, Ceftaroline cannot. I might do combo therapy with Linezolid and " Dapto instead for home.     Monitoring for adverse effects of abx such as rash/itching/diarrhea - none apparent.     Will follow. Thanks! D/w Dr. Janis Jauregui MD  [facility]  Office #[phone]

## 2024-05-23 NOTE — CARE PLAN
The patient's goals for the shift include  pt will remain HDS throughout the shift    The clinical goals for the shift include Pt pain will remain tolerable throughout this shift    Over the shift, the patient did  make progress toward the following goals. Barriers to progression include . Recommendations to address these barriers include .

## 2024-05-23 NOTE — PROGRESS NOTES
"Nutrition Follow-up Note  Nutrition Assessment     Hospital day #23 with SIRS.  Continues with poor-fair intake.  Medical staff concerned about intake and asked for us to visit him to encourage intake.  Reports he is trying to eat better, but really doesn't care for menu options and feels his meals are often cold when they arrive.  His mother agrees to bring food items he likes to snack on.  Is at risk for malnutrition  r/t inadequate intake.    Anthropometrics:  Height: 175.3 cm (5' 9.02\")  Weight: 68.5 kg (151 lb)  BMI (Calculated): 22.29  Weight Change: -2.58  Weight Change  Weight History / % Weight Change: 11/10/23: 79.4kg.  23: 92 kg.  21% loss over 8 months.  Some may be related to HD treatments.  Significant Weight Loss: Yes  IBW/kg (Dietitian Calculated): 72.7 kg   Amputation Calculations:  BMI Amputation Adjustment: Yes  Percent Amputation: Upper arm, Thigh  Total Amputation Percentage: 12.8  Adjusted IBW/k.7  Adjusted BMI: 27.2    Energy Needs:  Calculated Energy Needs Using Equations  Height: 175.3 cm (5' 9.02\")  Temp: 36.4 °C (97.6 °F)    Estimated Energy Needs  Method for Estimating Needs: 3348-8867 @ 30-35 kcal/kg IBW    Estimated Protein Needs  Method for Estimating Needs:  @ 1.5-1.7 gr/kg IBW    Estimated Fluid Needs  Method for Estimating Needs: per MD     Dietary Orders   Adult diet Regular, Carb Controlled; 75 gram carb/meal, 45 gram Carb evening snack   Oral nutritional supplements   Select supplement: Nepro With Carbsteady TID     Nutrition Diagnosis   Malnutrition Diagnosis  Patient has Malnutrition Diagnosis: Yes  Diagnosis Status: New  Malnutrition Diagnosis: Severe malnutrition related to chronic disease or condition  As Evidenced by: 18% weight loss over 8 months, intake <75% meals for > 1 month.    Patient has Nutrition Diagnosis: Yes  Nutrition Diagnosis 1: Inadequate protein energy intake  Diagnosis Status (1): New  Related to (1): increased energy demands  As Evidenced " by (1): intake <50% of meals.     Results  POCT GLUCOSE  Result Value Ref Range   POCT Glucose 82 74 - 99 mg/dL  POCT GLUCOSE  Result Value Ref Range   POCT Glucose 82 74 - 99 mg/dL  Renal Function Panel  Result Value Ref Range   Glucose 48 (LL) 74 - 99 mg/dL   Sodium 134 (L) 136 - 145 mmol/L   Potassium 4.1 3.5 - 5.3 mmol/L   Chloride 97 (L) 98 - 107 mmol/L   Bicarbonate 26 21 - 32 mmol/L   Anion Gap 15 10 - 20 mmol/L   Urea Nitrogen 19 6 - 23 mg/dL   Creatinine 6.27 (H) 0.50 - 1.30 mg/dL   eGFR 11 (L) >60 mL/min/1.73m*2   Calcium 7.9 (L) 8.6 - 10.3 mg/dL   Phosphorus 5.2 (H) 2.5 - 4.9 mg/dL   Albumin 2.1 (L) 3.4 - 5.0 g/dL  POCT GLUCOSE  Result Value Ref Range   POCT Glucose 47 (L) 74 - 99 mg/dL  POCT GLUCOSE  Result Value Ref Range   POCT Glucose 79 74 - 99 mg/dL  POCT GLUCOSE  Result Value Ref Range   POCT Glucose 114 (H) 74 - 99 mg/dL  POCT GLUCOSE  Result Value Ref Range   POCT Glucose 136 (H) 74 - 99 mg/dL       Nutrition Interventions/Recommendations   Nutrition Prescription  Individualized Nutrition Prescription Provided for : Continue diet as ordered, moniotr RFP and add mineral restrictions as appropriate. Physician ordered Nepro shake TID, discussed with patient, as he had  previously defered.  He agrees to try them.  Encouraged hs snack to help with glycimic control & he agrees to have crackers with peanut butter. Daily renal MVI.    Food and/or Nutrient Delivery Interventions  Interventions: Meals and snacks  Meals and Snacks: Carbohydrate-modified diet  Goal: intake meets >75% estimated nutrient needs.        Nutrition Monitoring and Evaluation   Food and Nutrient Related History   Amount of Food: Estimated amout of food  Criteria: intake >75% of meals    Anthropometrics: Body Composition/Growth/Weight History  Weight: Measured weight  Criteria: daily   Obtain dry weight s/p HD    Weight Change: Weight change percentage  Criteria: weekly    Biochemical Data, Medical Tests and Procedures  Electrolyte  and Renal Panel: BUN, Calcium, serum, Chloride, Creatinine, Magnesium, Phosphorus, Potassium, Sodium  Criteria: as indicated  Glucose/Endocrine Profile: Glucose, casual, Hemoglobin A1c (HgbA1c)  Criteria: as indicated  Nutritional Anemia Profile: Folate, serum, Hematocrit, Hemoglobin, Iron, serum  Criteria: as indicated  Vitamin Profile: Vitamin D, 25 hydroxy, Other (Comment)  Criteria: as indicated    Nutrition Focused Physical Findings  Digestive System: Anorexia, Constipation, Diarrhea, Early satiety, Nausea, Vomiting  Criteria: daily     Follow Up  Last Date of Nutrition Visit: 05/23/24  Nutrition Follow-Up Needed?: Dietitian to reassess per policy  Follow up Comment: poor-fair PO-TR

## 2024-05-23 NOTE — PROGRESS NOTES
Edwar Hemphill is a 39 y.o. male on day 23 of admission presenting with SIRS (systemic inflammatory response syndrome) (Multi).      Subjective  pt doing ok  No new complaints  Remains hypoglycemic in ams  Per nurse not much PO in evenings    Does have multiple juice cups (full) and chocolate covered pretzels at bed side      Objective     Last Recorded Vitals  BP 93/63 (BP Location: Left arm, Patient Position: Lying)   Pulse 84   Temp 36.2 °C (97.2 °F) (Oral)   Resp 18   Wt 68.5 kg (151 lb)   SpO2 96%   intake/Output last 3 Shifts:      Admission Weight  Weight: 65.8 kg (145 lb) (04/30/24 0825)      Image Results  Electrocardiogram, 12-lead PRN ACS symptoms  Atrial fibrillation with rapid ventricular response with premature ventricular or aberrantly conducted complexes  Nonspecific ST and T wave abnormality , probably digitalis effect  Abnormal ECG  When compared with ECG of 16-JAN-2024 07:06,  Atrial fibrillation has replaced Sinus rhythm  ST elevation now present in Inferior leads  Nonspecific T wave abnormality, worse in Lateral leads  Confirmed by Brandon Nur ([id number]) on 5/20/2024 7:36:36 AM      PHYSICAL EXAM     NEUROLOGICAL: No abnormal movements, no changes from before  HEENT: Head atraumatic, perrl,eomi, no oral lesions, no ear rash   NECK: Supple, no ln, jvp flat, thyroid palp  HEART: S1, S2, no added sound  LUNGS: dec a/e  EXTREMITIES: rt ankle edema presetn   Rt groin HD access, no drainage  ABDOMEN: Soft, nontender, bowel sound positive, no organomegaly  SKIN: No change    Relevant Results  Scheduled medications  [Held by provider] apixaban, 5 mg, oral, BID  B complex-vitamin C-folic acid, 1 capsule, oral, Daily  ceftaroline, 200 mg, intravenous, q8h  DAPTOmycin, 700 mg, intravenous, q48h  epoetin tyson or biosimilar, 100 Units/kg, subcutaneous, Every Mon/Wed/Fri  heparin, 3,100 Units, intra-catheter, After Dialysis  heparin, 3,100 Units, intra-catheter, After Dialysis  insulin lispro, 0-5  Units, subcutaneous, TID  levothyroxine, 125 mcg, oral, Daily before breakfast  lidocaine, 1 patch, transdermal, Daily  pantoprazole, 40 mg, intravenous, BID  polyethylene glycol, 17 g, oral, Daily  sennosides, 8.6 mg, oral, BID  sevelamer carbonate, 2,400 mg, oral, TID  sodium zirconium cyclosilicate, 10 g, oral, Daily      Continuous medications  oxygen, , Last Rate: 2 L/min (05/03/24 1758)      PRN medications  PRN medications: cyclobenzaprine, dextrose, dextrose, fentaNYL PF, glucagon, heparin, lidocaine, lidocaine PF, loperamide, midazolam, nitroglycerin, ondansetron, oxyCODONE, oxygen, oxygen, oxygen, simethicone  Results for orders placed or performed during the hospital encounter of 04/30/24 (from the past 96 hour(s))   POCT GLUCOSE   Result Value Ref Range    POCT Glucose 90 74 - 99 mg/dL   CBC   Result Value Ref Range    WBC 5.8 4.4 - 11.3 x10*3/uL    nRBC 0.0 0.0 - 0.0 /100 WBCs    RBC 2.88 (L) 4.50 - 5.90 x10*6/uL    Hemoglobin 7.9 (L) 13.5 - 17.5 g/dL    Hematocrit 25.7 (L) 41.0 - 52.0 %    MCV 89 80 - 100 fL    MCH 27.4 26.0 - 34.0 pg    MCHC 30.7 (L) 32.0 - 36.0 g/dL    RDW 17.5 (H) 11.5 - 14.5 %    Platelets 207 150 - 450 x10*3/uL   SST TOP   Result Value Ref Range    Extra Tube Hold for add-ons.    POCT GLUCOSE   Result Value Ref Range    POCT Glucose 65 (L) 74 - 99 mg/dL   POCT GLUCOSE   Result Value Ref Range    POCT Glucose 86 74 - 99 mg/dL   CBC   Result Value Ref Range    WBC 5.0 4.4 - 11.3 x10*3/uL    nRBC 0.0 0.0 - 0.0 /100 WBCs    RBC 2.84 (L) 4.50 - 5.90 x10*6/uL    Hemoglobin 7.7 (L) 13.5 - 17.5 g/dL    Hematocrit 25.3 (L) 41.0 - 52.0 %    MCV 89 80 - 100 fL    MCH 27.1 26.0 - 34.0 pg    MCHC 30.4 (L) 32.0 - 36.0 g/dL    RDW 17.5 (H) 11.5 - 14.5 %    Platelets 149 (L) 150 - 450 x10*3/uL   Renal Function Panel   Result Value Ref Range    Glucose 68 (L) 74 - 99 mg/dL    Sodium 134 (L) 136 - 145 mmol/L    Potassium 3.5 3.5 - 5.3 mmol/L    Chloride 98 98 - 107 mmol/L    Bicarbonate 29 21 - 32  mmol/L    Anion Gap 11 10 - 20 mmol/L    Urea Nitrogen 10 6 - 23 mg/dL    Creatinine 3.26 (H) 0.50 - 1.30 mg/dL    eGFR 24 (L) >60 mL/min/1.73m*2    Calcium 7.8 (L) 8.6 - 10.3 mg/dL    Phosphorus 2.9 2.5 - 4.9 mg/dL    Albumin 2.3 (L) 3.4 - 5.0 g/dL   POCT GLUCOSE   Result Value Ref Range    POCT Glucose 68 (L) 74 - 99 mg/dL   POCT GLUCOSE   Result Value Ref Range    POCT Glucose 113 (H) 74 - 99 mg/dL   POCT GLUCOSE   Result Value Ref Range    POCT Glucose 94 74 - 99 mg/dL   POCT GLUCOSE   Result Value Ref Range    POCT Glucose 116 (H) 74 - 99 mg/dL   POCT GLUCOSE   Result Value Ref Range    POCT Glucose 82 74 - 99 mg/dL   SST TOP   Result Value Ref Range    Extra Tube Hold for add-ons.    Blood Culture    Specimen: Peripheral Venipuncture; Blood culture   Result Value Ref Range    Blood Culture No growth at 1 day    Blood Culture    Specimen: Peripheral Venipuncture; Blood culture   Result Value Ref Range    Blood Culture No growth at 1 day    CBC   Result Value Ref Range    WBC 5.0 4.4 - 11.3 x10*3/uL    nRBC 0.0 0.0 - 0.0 /100 WBCs    RBC 2.63 (L) 4.50 - 5.90 x10*6/uL    Hemoglobin 7.2 (L) 13.5 - 17.5 g/dL    Hematocrit 23.6 (L) 41.0 - 52.0 %    MCV 90 80 - 100 fL    MCH 27.4 26.0 - 34.0 pg    MCHC 30.5 (L) 32.0 - 36.0 g/dL    RDW 17.5 (H) 11.5 - 14.5 %    Platelets 151 150 - 450 x10*3/uL   Renal function panel   Result Value Ref Range    Glucose 102 (H) 74 - 99 mg/dL    Sodium 132 (L) 136 - 145 mmol/L    Potassium 4.0 3.5 - 5.3 mmol/L    Chloride 98 98 - 107 mmol/L    Bicarbonate 25 21 - 32 mmol/L    Anion Gap 13 10 - 20 mmol/L    Urea Nitrogen 14 6 - 23 mg/dL    Creatinine 4.69 (H) 0.50 - 1.30 mg/dL    eGFR 15 (L) >60 mL/min/1.73m*2    Calcium 8.6 8.6 - 10.3 mg/dL    Phosphorus 4.8 2.5 - 4.9 mg/dL    Albumin 2.3 (L) 3.4 - 5.0 g/dL   POCT GLUCOSE   Result Value Ref Range    POCT Glucose 101 (H) 74 - 99 mg/dL   POCT GLUCOSE   Result Value Ref Range    POCT Glucose 107 (H) 74 - 99 mg/dL   POCT GLUCOSE   Result  Value Ref Range    POCT Glucose 127 (H) 74 - 99 mg/dL   POCT GLUCOSE   Result Value Ref Range    POCT Glucose 40 (L) 74 - 99 mg/dL   POCT GLUCOSE   Result Value Ref Range    POCT Glucose 36 (L) 74 - 99 mg/dL   POCT GLUCOSE   Result Value Ref Range    POCT Glucose 104 (H) 74 - 99 mg/dL   POCT GLUCOSE   Result Value Ref Range    POCT Glucose 74 74 - 99 mg/dL   POCT GLUCOSE   Result Value Ref Range    POCT Glucose 82 74 - 99 mg/dL   POCT GLUCOSE   Result Value Ref Range    POCT Glucose 82 74 - 99 mg/dL   Lavender Top   Result Value Ref Range    Extra Tube Hold for add-ons.    Creatine Kinase   Result Value Ref Range    Creatine Kinase 52 0 - 325 U/L   Renal Function Panel   Result Value Ref Range    Glucose 48 (LL) 74 - 99 mg/dL    Sodium 134 (L) 136 - 145 mmol/L    Potassium 4.1 3.5 - 5.3 mmol/L    Chloride 97 (L) 98 - 107 mmol/L    Bicarbonate 26 21 - 32 mmol/L    Anion Gap 15 10 - 20 mmol/L    Urea Nitrogen 19 6 - 23 mg/dL    Creatinine 6.27 (H) 0.50 - 1.30 mg/dL    eGFR 11 (L) >60 mL/min/1.73m*2    Calcium 7.9 (L) 8.6 - 10.3 mg/dL    Phosphorus 5.2 (H) 2.5 - 4.9 mg/dL    Albumin 2.1 (L) 3.4 - 5.0 g/dL   POCT GLUCOSE   Result Value Ref Range    POCT Glucose 47 (L) 74 - 99 mg/dL   POCT GLUCOSE   Result Value Ref Range    POCT Glucose 79 74 - 99 mg/dL                Assessment/Plan           This patient has a central line   Reason for the central line remaining today? 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 Will make adjustments as necessary.          Malnutrition Diagnosis Status: New  Malnutrition Diagnosis: Severe malnutrition related to chronic disease or condition  As Evidenced by: 18% weight loss over 8 months, intake <75% meals for > 1 month.  I agree with the dietitian's malnutrition diagnosis.    Plan of care discussed with: Provider, RN, Patient.     Patient case and plan of care discussed with Dr. DAWNA Estrada.    AMARILYS Haas - CNP  -In collaboration with Dr. DAWNA Estrada    [facility]  Office: [phone]  Fax: [phone]   I have reviewed the above note obtained and documented by the NP/PA and I personally participated in the key components. I have discussed the case and management of the patient's care. Changes made to the note, and all key components of history and physical/progress note done by me.  dw nursing  Does have hypoglycemia and has been taking po  Lantus has been discontinued  Will cont with current   Blood culture from 5/21 negative   Noted ID input for HD cath placement tomorrow  Dw Renal   Noted rec for antibiotics,   [place] Estrada MD

## 2024-05-23 NOTE — PROGRESS NOTES
Edwar Hemphill is a 39 y.o. male on day 23 of admission presenting with SIRS (systemic inflammatory response syndrome) (Multi).    Subjective   Mr. Hemphill is a 39-year-old man with a history of ESRD on hemodialysis.  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  He has had many bloodstream infections related to catheters, again during this admission.  He has MRSA, permcath was removed but he required a temporary femoral hemodialysis catheter due to persistent bacteremia.  TTE without endocarditis, then had a POLI as well on May 14.  CT was without an epidural abscess, MRI was done that was also negative for infection.    Resting comfortably in bed.   No respiratory complaints.   GILMAR.   Chart/labs/meds/notes/imaging/VS reviewed.       Objective       Physical Exam  Constitutional:       Appearance: Normal appearance.   HENT:      Mouth/Throat:      Mouth: Mucous membranes are moist.   Eyes:      Extraocular Movements: Extraocular movements intact.      Pupils: Pupils are equal, round, and reactive to light.   Cardiovascular:      Rate and Rhythm: Regular rhythm.      Heart sounds: S1 normal and S2 normal.   Pulmonary:      Breath sounds: Poor effort, no crackles  Abdominal:      Comments: Soft, NT/ND, no masses, normal bowel sounds   Genitourinary:     Comments: No dodd  Musculoskeletal:      Right lower leg: No edema.      Left lower leg: No edema.   Right upper extremity amputation  Skin:     General: Skin is warm and dry.   Neurological:      General: No focal deficit present.      Mental Status: She is alert and oriented to person, place, and time.   Psychiatric:         Behavior: Behavior normal.        Meds    Current Facility-Administered Medications:     [Held by provider] apixaban (Eliquis) tablet 5 mg, 5 mg, oral, BID, AMARILYS Wilson-CNP, DNP, 5 mg at 05/03/24 2156    B complex-vitamin C-folic acid (Nephrocaps) capsule 1 capsule, 1 capsule, oral, Daily, Andi CHAVEZ  Miktarian, APRN-CNP, DNP, 1 capsule at 05/23/24 0839    ceftaroline (Teflaro) 200 mg in dextrose 5 % in water (D5W) 50 mL IV, 200 mg, intravenous, q8h, Sheldon Saleh MD, Last Rate: 0 mL/hr at 05/23/24 0352, 200 mg at 05/23/24 1320    cyclobenzaprine (Flexeril) tablet 5 mg, 5 mg, oral, TID PRN, El Estrada MD, 5 mg at 05/21/24 2358    DAPTOmycin (Cubicin) 700 mg/100 mL  mg, 700 mg, intravenous, q48h, Sheldon Saleh MD, Stopped at 05/22/24 1823    dextrose 50 % injection 12.5 g, 12.5 g, intravenous, q15 min PRN, Veronica De La Paz MD, 12.5 g at 05/23/24 0822    dextrose 50 % injection 25 g, 25 g, intravenous, q15 min PRN, Mike Santana MD, 25 g at 05/22/24 0955    epoetin tyson-epbx (Retacrit) injection 8,000 Units, 100 Units/kg, subcutaneous, Every Mon/Wed/Fri, Eber Bruce DO, 8,000 Units at 05/22/24 1756    fentaNYL PF (Sublimaze) injection, , , PRN, Ronaldo Dempsey MD, 25 mcg at 05/13/24 1513    glucagon (Glucagen) injection 1 mg, 1 mg, intramuscular, q15 min PRN, Veronica De La Paz MD    heparin 1,000 unit/mL injection 3,100 Units, 3,100 Units, intra-catheter, After Dialysis, OSBALDO Wilson, DNP, 3,100 Units at 05/20/24 1223    heparin 1,000 unit/mL injection 3,100 Units, 3,100 Units, intra-catheter, After Dialysis, OSBALDO Wilson DNP, 3,100 Units at 05/20/24 1223    heparin 1,000 unit/mL injection, , , PRN, Ronaldo Dempsey MD, 1,800 Units at 05/13/24 2329    insulin lispro (HumaLOG) injection 0-5 Units, 0-5 Units, subcutaneous, TID, Veronica De La Paz MD, 1 Units at 05/16/24 0956    levothyroxine (Synthroid, Levoxyl) tablet 125 mcg, 125 mcg, oral, Daily before breakfast, Andi Bazan, APRN-CNP, DNP, 125 mcg at 05/23/24 0605    lidocaine (Xylocaine) 20 mg/mL (2 %) injection, , , PRN, Ronaldo Dempsey MD, 5 mL at 05/13/24 1513    lidocaine 4 % patch 1 patch, 1 patch, transdermal, Daily, Michelle Drummond PA-C, 1 patch at 05/23/24 0839    lidocaine PF  (Xylocaine) 10 mg/mL (1 %) injection, , , PRN, Ronaldo Dempsey MD, 5 mL at 05/03/24 1816    loperamide (Imodium) capsule 2 mg, 2 mg, oral, q4h PRN, OSBALDO Wilson, DNP    midazolam (Versed) injection, , , PRN, Ronaldo Dempsey MD, 0.5 mg at 05/13/24 1513    nitroglycerin (Nitrostat) SL tablet 0.4 mg, 0.4 mg, sublingual, q5 min PRN, OSBALDO Wilson, DNP    ondansetron (Zofran) injection 4 mg, 4 mg, intravenous, q6h PRN, OSBALDO Wilson, DNP    oxyCODONE (Roxicodone) immediate release tablet 5 mg, 5 mg, oral, q4h PRN, OSBALDO Wilson, LUCILA, 5 mg at 05/23/24 0855    oxygen (O2) therapy, , , Continuous PRN, Ronaldo Dempsey MD, Last Rate: 120,000 mL/hr at 05/03/24 1758, 2 L/min at 05/03/24 1758    oxygen (O2) therapy, , inhalation, Continuous PRN - O2/gases, OSBALDO Rousseau    oxygen (O2) therapy, , inhalation, Continuous PRN - O2/gases, OSBALDO Rousseau    pantoprazole (ProtoNix) injection 40 mg, 40 mg, intravenous, BID, OSBALDO Marin, 40 mg at 05/23/24 0839    polyethylene glycol (Glycolax, Miralax) packet 17 g, 17 g, oral, Daily, OSBALDO Wilson, DNP, 17 g at 05/22/24 1014    sennosides (Senokot) tablet 8.6 mg, 8.6 mg, oral, BID, OSBALDO Wilson, DNP, 8.6 mg at 05/22/24 1029    sevelamer carbonate (Renvela) tablet 2,400 mg, 2,400 mg, oral, TID, OSBALDO Wilson, LUCILA, 2,400 mg at 05/23/24 1241    simethicone (Mylicon) chewable tablet 80 mg, 80 mg, oral, q8h PRN, OSBALDO Wilson, DNP    [Held by provider] sodium zirconium cyclosilicate (Lokelma) packet 10 g, 10 g, oral, Daily, Eber Bruce DO, 10 g at 05/23/24 0839   Medications Discontinued During This Encounter   Medication Reason    loperamide (Imodium A-D) 2 mg tablet Med List Cleanup    piperacillin-tazobactam-dextrose (Zosyn) IV 2.25 g     piperacillin-tazobactam-dextrose (Zosyn) IV 2.25 g     heparin 1,000  "unit/mL injection 3,100 Units     heparin 1,000 unit/mL injection 3,100 Units     glucagon (Glucagen) injection 1 mg     dextrose 50 % injection 25 g     glucagon (Glucagen) injection 1 mg     dextrose 50 % injection 12.5 g     insulin lispro (HumaLOG) injection 0-5 Units     melatonin tablet 3 mg     insulin regular 100 unit/100 mL (1 unit/mL) in 0.9 % NaCl infusion     insulin regular (HumuLIN, NovoLIN) bolus from bag 2-6 Units     dextrose 5 % and lactated Ringer's infusion     vancomycin (Vancocin) pharmacy to dose - pharmacy monitoring Duplicate order    epoetin tyson-epbx (Retacrit) injection 8,000 Units Availability    insulin glargine (Lantus) injection 10 Units     epoetin tyson-epbx (Retacrit) injection 8,000 Units     norepinephrine (Levophed) 8 mg in dextrose 5% 250 mL (0.032 mg/mL) infusion (premix)     insulin glargine (Lantus) injection 5 Units     oxygen (O2) therapy     insulin glargine (Lantus) injection 4 Units     atorvastatin (Lipitor) tablet 40 mg     DAPTOmycin (Cubicin) 450 mg in sodium chloride 0.9% 50 mL IV     vancomycin (Vancocin) pharmacy to dose - pharmacy monitoring     sodium chloride 0.9% infusion     insulin glargine (Lantus) injection 2 Units         Allergies  Allergies   Allergen Reactions    Cashew Nut Anaphylaxis and Swelling     cashews        Last Recorded Vitals  Blood pressure 92/62, pulse 84, temperature 36.4 °C (97.6 °F), temperature source Oral, resp. rate 18, height 1.753 m (5' 9.02\"), weight 68.5 kg (151 lb), SpO2 94%.  Intake/Output last 3 Shifts:  No intake/output data recorded.    Last 24 hour Results  Results for orders placed or performed during the hospital encounter of 04/30/24 (from the past 24 hour(s))   POCT GLUCOSE   Result Value Ref Range    POCT Glucose 82 74 - 99 mg/dL   POCT GLUCOSE   Result Value Ref Range    POCT Glucose 82 74 - 99 mg/dL   Lavender Top   Result Value Ref Range    Extra Tube Hold for add-ons.    Creatine Kinase   Result Value Ref Range    " Creatine Kinase 52 0 - 325 U/L   Renal Function Panel   Result Value Ref Range    Glucose 48 (LL) 74 - 99 mg/dL    Sodium 134 (L) 136 - 145 mmol/L    Potassium 4.1 3.5 - 5.3 mmol/L    Chloride 97 (L) 98 - 107 mmol/L    Bicarbonate 26 21 - 32 mmol/L    Anion Gap 15 10 - 20 mmol/L    Urea Nitrogen 19 6 - 23 mg/dL    Creatinine 6.27 (H) 0.50 - 1.30 mg/dL    eGFR 11 (L) >60 mL/min/1.73m*2    Calcium 7.9 (L) 8.6 - 10.3 mg/dL    Phosphorus 5.2 (H) 2.5 - 4.9 mg/dL    Albumin 2.1 (L) 3.4 - 5.0 g/dL   POCT GLUCOSE   Result Value Ref Range    POCT Glucose 47 (L) 74 - 99 mg/dL   POCT GLUCOSE   Result Value Ref Range    POCT Glucose 79 74 - 99 mg/dL   POCT GLUCOSE   Result Value Ref Range    POCT Glucose 114 (H) 74 - 99 mg/dL        Imaging results  Electrocardiogram, 12-lead PRN ACS symptoms    Result Date: 5/17/2024  Atrial fibrillation with rapid ventricular response with premature ventricular or aberrantly conducted complexes Nonspecific ST and T wave abnormality , probably digitalis effect Abnormal ECG When compared with ECG of 16-JAN-2024 07:06, Atrial fibrillation has replaced Sinus rhythm ST elevation now present in Inferior leads Nonspecific T wave abnormality, worse in Lateral leads    IR CVC removal    Result Date: 5/16/2024  These images are not reportable by radiology and will not be interpreted by  Radiologists.    Transesophageal Echo (POLI)    Result Date: 5/14/2024   [facility], [address] [place] [address]              Tel [phone] and Fax [phone] TRANSESOPHAGEAL ECHOCARDIOGRAM REPORT  Patient Name:      XIOMARA Maya Physician:    [id number] Edward Hernandez DO Study Date:        5/14/2024           Ordering Provider:    [id number] ALKA VIEYRA MRN/PID:           [id number]            Fellow: Accession#:        [id number]        Nurse:                Ronaldo                                                               Chilo THORNTON Date of Birth/Age: 1985 / 39      Sonographer:          Alejandro Carroll RDCS                    years Gender:            M                   Additional Staff:     Kayden Matthews CRNA Height:            175.26 cm           Admit Date:           4/30/2024 Weight:            72.58 kg            Admission Status:     Inpatient -                                                              Routine BSA / BMI:         1.88 m2 / 23.63     Encounter#:           [id number]                    kg/m2                                        Department Location:  [facility] Non                                                              Invasive Blood Pressure: 116 /69 mmHg Study Type:    TRANSESOPHAGEAL ECHO (POLI) Diagnosis/ICD: Bacteremia-R78.81 Indication:    Staphyococcus aureus bacteremia, R/o endocarditis CPT Code:      POLI Complete-86732; Doppler Limited-56580; Color Doppler-53079 Patient History: Allergies:         Cashew nuts. Smoker:            Unknown. Diabetes:          Yes Insulin Pertinent History: HTN, Hyperlipidemia and PVD. 39 y.o. male presents for POLI                    for endocarditis rule out.                     PMH of ESRD and SIRS. Study Detail: The following Echo studies were performed: 2D. Agitated saline               used as a contrast agent for intraseptal flow evaluation. Total               contrast used for this procedure was 10 mL via IV push. Patient's               heart rhythm is sinus tachycardia. The patient was sedated.  PHYSICIAN INTERPRETATION: POLI Details: The POLI probe used was 5177. Technically adequate omniplane transesophageal echocardiogram performed. POLI Medication: The pharynx was anesthetized with Cetacaine spray and viscous lidocaine. The patient was sedated by Anesthesia; please refer to anesthesia flow sheet for medications used. POLI Procedure: The probe was passed without difficulty. Left Ventricle: The left ventricular systolic function is mildly  decreased, with an estimated ejection fraction of 35-40%. Wall motion is abnormal. The left ventricular cavity size was not assessed. Left ventricular diastolic filling was not assessed. Left Atrium: The left atrium is normal in size. There is no definite left atrial thrombus present. A bubble study using agitated saline was performed. Bubble study is negative. There is a normal sized left atrial appendage. Right Ventricle: The right ventricle is normal in size. There is normal right ventricular global systolic function. Right Atrium: The right atrium is normal in size. Aortic Valve: There is no visualized aortic valve vegetation. The aortic valve appears structurally normal. There is no evidence of aortic valve regurgitation. Mitral Valve: The mitral valve is normal in structure. There was no mitral valve vegetation visualized. There is trace mitral valve regurgitation. Tricuspid Valve: No vegetation is seen on the tricuspid valve. The tricuspid valve is structurally normal. There is trace tricuspid regurgitation. Pulmonic Valve: No pulmonic valve vegetation visualized. The pulmonic valve is structurally normal. There is no indication of pulmonic valve regurgitation. Pericardium: There is no pericardial effusion noted. Aorta: The aortic root is normal.  CONCLUSIONS:  1. Left ventricular systolic function is mildly decreased with a 35-40% estimated ejection fraction.  2. No mitral valve vegetation visualized.  3. No tricuspid valve vegetation.  4. No aortic valve vegetation visualized.  5. No pulmonic valve vegetation visualized.  6. No left atrial thrombus. QUANTITATIVE DATA SUMMARY:  [id number] Edward David CASEY Electronically signed on 5/14/2024 at 7:06:59 PM  ** Final **     IR CVC tunneled    Result Date: 5/13/2024  Interpreted By:  Ronaldo Dempsey, STUDY: IR CVC TUNNELED; ;  5/13/2024 3:26 pm   ULTRASOUND FLUOROSCOPICALLY GUIDED RIGHT GROIN TEMPORARY HEMODIALYSIS CATHETER PLACEMENT   INDICATION:  Signs/Symptoms:Temporary dialysis line today. 39-year-old man with end-stage renal disease, persistent bacteremia despite removal of groin tunneled hemodialysis catheter. Requires replacement of hemodialysis access.   COMPARISON: Fluoroscopic images from prior tunneled hemodialysis catheter exchange 05/13/2024   ACCESSION NUMBER(S): [id number]   ORDERING CLINICIAN: BRADEN QUIROGA   TECHNIQUE:   ATTENDING : Ronaldo Dempsey M.D.   TECHNICAL DESCRIPTION/FINDINGS: The procedure, including all risks, benefits and alternatives were explained to the patient in detail. All questions were answered and written informed consent was obtained.   Patient was positioned supine on the fluoroscopy table. A time-out was performed.   The bilateral groins were prepped and draped in usual sterile fashion. Focused ultrasound was performed over the superior aspect of the right common femoral vein demonstrating superior patency with partial compressibility. Ultrasound images were saved. 1% lidocaine was administered subcutaneously for local anesthesia. Under real-time ultrasound guidance, the right common femoral vein was accessed in antegrade direction using micropuncture technique. Ultrasound images were saved. Under fluoroscopic visualization, an 018 guidewire was advanced into the right common iliac vein and micropuncture transitional introducer was utilized for placement of an 035 guidewire into the IVC. After serial dilation, a 13 Croatian by 20 cm Trialysis catheter was then placed. A completion fluoroscopic image was obtained demonstrating the tip of the temporary hemodialysis catheter projecting over the IV C.   Catheter lumens easily aspirated and flushed. Large-bore dialysis lumens were locked with a concentrated heparinized solution (1000 units/cc). A small bore 3rd lumen was locked with a dilute heparinized solution. The catheter hub was sutured to the skin with 2 0 Prolene stay suture. Sterile dressing was applied.    SEDATION/MEDICATIONS: Continuous cardiopulmonary monitoring was performed by a radiology nurse for the duration of the procedure. 0.5 mg Versed and   25 mcg Fentanyl were administered for moderate conscious sedation time of 20 minutes.      10 cc 1% Lidocaine was administered subcutaneously for local anesthesia. SPECIMENS: None. ESTIMATED BLOOD LOSS:  5 cc FLOUROSCOPY:  0.1 minutes; DAP  2405 mGy-cm*2; Air Kerma  6.58 mGy CONTRAST: None.   FINDINGS: Test       Ultrasound fluoroscopically guided right groin temporary hemodialysis catheter. The catheter is ready for immediate use.     MACRO: None   Signed by: Ronaldo Dempsey 5/13/2024 4:59 PM Dictation workstation:   [id number]    MR lumbar spine wo IV contrast    Result Date: 5/4/2024  Interpreted By:  Eva Og and Muddasani Dheeraj STUDY: MR LUMBAR SPINE WO IV CONTRAST   INDICATION: Signs/Symptoms:back pain new with bacteremia, possible infection   COMPARISON: CT of the lumbar spine 04/30/2024. Body CT 01/14/2024.   ACCESSION NUMBER(S): [id number]   ORDERING CLINICIAN: ALKA VIEYRA   TECHNIQUE: Sagittal T1, sagittal T2, sagittal STIR, axial T1 and axial T2 weighted MRI images of the lumbar spine were obtained without intravenous contrast administration.   FINDINGS: Image quality is somewhat degraded due to motion and technique.   There are 5 non-rib-bearing lumbar-type vertebral bodies. The last well-formed intervertebral disc is labeled L5-S1.   Minimal retrolisthesis at L5-S1. Alignment is otherwise normal.   Vertebral body heights are maintained. Disc spaces are relatively maintained.   There is diffusely hypointense bone marrow signal on the T1 weighted images which may be related to renal osteodystrophy. Differential considerations include anemia, reactive marrow or bone marrow infiltrating process.   There is no increased STIR signal to suggest marrow edema. Multilevel degenerative endplate changes include prominent Schmorl's nodes most  pronounced at L3-4 where there are Modic type 2 changes and central intradiscal T1 and T2 hypointense signal likely degenerative.   The conus medullaris terminates at L1-2. The visualized spinal cord, conus medullaris and cauda equina demonstrate normal signal and morphology.   Fatty atrophic changes involve the paravertebral musculature. Partially visualized kidneys demonstrate atrophic changes with multiple bilateral cysts, better characterized on previous body CT dated 01/14/2024. Partially visualized catheter within the inferior vena cava and right common iliac vein.   T10-11 on sagittal images only: No spinal canal or neural foraminal stenosis.   T11-12 on sagittal images only: No spinal canal or neural foraminal stenosis.   T12-L1: Facet arthropathy and ligamentum flavum thickening. No spinal canal or neural foraminal stenosis.   L1-2: Facet arthropathy and ligamentum flavum thickening. No spinal canal or neural foraminal stenosis.   L2-3: Facet arthropathy and ligamentum flavum thickening. No spinal canal or neural foraminal stenosis.   L3-4: Disc bulge, facet arthropathy and ligamentum flavum thickening. No spinal canal or neural foraminal stenosis.   L4-5: Disc bulge with small central protrusion, facet arthropathy and ligamentum flavum thickening. No spinal canal stenosis. Mild bilateral neural foraminal stenosis.   L5-S1: Disc bulge, facet arthropathy and ligamentum flavum thickening. No spinal canal stenosis. Mild right and no left neural foraminal stenosis.   Degenerative changes are noted at the sacroiliac joints.       Diffusely hypointense bone marrow signal on the T1 weighted images may be related to renal osteodystrophy. Differential considerations include anemia, reactive bone marrow or bone marrow infiltrating process.   No definite imaging findings to suggest discitis osteomyelitis on the current exam.   Degenerative changes without significant spinal canal stenosis. Varying degrees of mild  neural foraminal narrowing as detailed above.   I personally reviewed the images/study and I agree with the findings as stated by Dr. Lowe.   MACRO: None   Signed by: Eva Earle 5/4/2024 11:11 AM Dictation workstation:   [id number]    IR CVC exchange    Result Date: 5/3/2024  Interpreted By:  Ronaldo Dempsey, STUDY: IR CVC EXCHANGE; ;  5/3/2024 6:28 pm   FLUOROSCOPICALLY GUIDED EXCHANGE OF RIGHT GROIN TUNNELED HEMODIALYSIS CATHETER   INDICATION: Signs/Symptoms:Bacteremia- Right fem TDC exchange. 39-year-old man with end-stage renal disease, central thoracic venous occlusion, end-stage vascular access with bacteremia. Guidewire exchange of right groin tunneled hemodialysis catheter in an attempt to salvage the access.   COMPARISON: Chest radiograph 04/30/2024, fluoroscopic images from prior tunneled hemodialysis catheter placement 01/22/2024   ACCESSION NUMBER(S): [id number]   ORDERING CLINICIAN: SACHI MORTENSEN   TECHNIQUE:   ATTENDING : Ronaldo Dempsey M.D.   TECHNICAL DESCRIPTION/FINDINGS: The procedure, including all risks, benefits and alternatives were explained to the patient in detail. All questions were answered and written informed consent was obtained.   The patient was positioned supine on the angiography table. A time-out was performed.   The right groin was prepped and draped in usual sterile fashion. A  fluoroscopic image was obtained demonstrating the tunneled hemodialysis catheter terminating at the inferior cavoatrial junction.   1% lidocaine was administered via the subcutaneous tunnel tract for local anesthesia. Blunt dissection was performed to free the subcutaneous catheter cuff. An 035 stiff Glidewire was then advanced through the catheter into the right atrium. The old catheter was removed. Over the guidewire, a new 14.5 [place] by 42 cm tip to cuff dual-lumen hemodialysis catheter was then placed. A completion fluoroscopic image demonstrates the catheter in the  inferior right atrium.   Catheter lumens easily aspirated and flushed and were locked with a concentrated heparinized solution (1000 units/cc). The catheter hub was then sutured to the skin with 2 0 Prolene stay suture. Because of losing along the tract, Gel-Foam pledgets were then administered subcutaneously near where the catheter enters the skin for additional hemostasis, followed by a pursestring suture. A sterile dressing was applied.   SEDATION/MEDICATIONS: Continuous cardiopulmonary monitoring was performed by a radiology nurse for the duration of the procedure.  1 mg Versed and   50 mcg Fentanyl were administered for moderate conscious sedation time of 20 minutes.      10 cc 1% Lidocaine was administered subcutaneously for local anesthesia. SPECIMENS: None. ESTIMATED BLOOD LOSS:  5 cc FLOUROSCOPY:  1.2 minutes; DAP  77773 mGy-cm*2; Air Kerma  59.07 mGy CONTRAST: None.   FINDINGS: Test       Fluoroscopically guidewire exchange of right groin tunneled hemodialysis catheter. The newly exchanged catheter is ready for immediate use.     MACRO: None   Signed by: Ronaldo Dempsey 5/3/2024 8:09 PM Dictation workstation:   [id number]    Transthoracic Echo Complete    Result Date: 5/3/2024   [facility], [address] [place] [address]              Tel [phone] and Fax [phone] TRANSTHORACIC ECHOCARDIOGRAM REPORT  Patient Name:      XIOMARA Maya Physician:    [id number] Brandon Nur DO Study Date:        5/3/2024             Ordering Provider:    [id number] ALKA VIEYRA MRN/PID:           [id number]             Fellow: Accession#:        [id number]         Nurse: Date of Birth/Age: 1985 / 39 years Sonographer:          Sy Lyons NAKITA Gender:            M                    Additional Staff: Height:            175.00 cm            Admit Date: Weight:            65.80 kg             Admission Status:     Inpatient  -                                                               Routine BSA / BMI:         1.80 m2 / 21.49      Encounter#:           [id number]                    kg/m2                                         Department Location:  [facility] Non                                                               Invasive Blood Pressure: 112 /74 mmHg Study Type:    TRANSTHORACIC ECHO (TTE) COMPLETE Diagnosis/ICD: Endocarditis, valve unspecified-I38 Indication:    endocarditis CPT Code:      Echo Complete w Full Doppler-39403 Patient History: Pertinent History: HTN and Hyperlipidemia. Study Detail: The following Echo studies were performed: M-Mode, Doppler, color               flow and 2D.  PHYSICIAN INTERPRETATION: Left Ventricle: The left ventricular systolic function is moderately decreased, with an estimated ejection fraction of 35-40%. There are no regional wall motion abnormalities. The left ventricular cavity size is normal. Abnormal (paradoxical) septal motion, consistent with left bundle branch block. Spectral Doppler shows an impaired relaxation pattern of left ventricular diastolic filling. Left Atrium: The left atrium is normal in size. Right Ventricle: The right ventricle is normal in size. There is normal right ventricular global systolic function. Right Atrium: The right atrium is normal in size. Aortic Valve: The aortic valve is probably trileaflet. There is no evidence of aortic valve regurgitation. Mitral Valve: The mitral valve is mildly thickened. There is trace mitral valve regurgitation. Tricuspid Valve: The tricuspid valve is structurally normal. No evidence of tricuspid regurgitation. Pulmonic Valve: The pulmonic valve is structurally normal. There is no indication of pulmonic valve regurgitation. Pericardium: There is no pericardial effusion noted. Aorta: The aortic root is normal. In comparison to the previous echocardiogram(s): Compared with study from 1/16/2024, LV function has declined. Was  previously normal.  CONCLUSIONS:  1. Left ventricular systolic function is moderately decreased with a 35-40% estimated ejection fraction.  2. Abnormal septal motion consistent with left bundle branch block.  3. Spectral Doppler shows an impaired relaxation pattern of left ventricular diastolic filling.  4. No vegetations visualized within the limitations of this study.  5. Compared with study from 1/16/2024, LV function has declined. Was previously normal. QUANTITATIVE DATA SUMMARY: LA VOLUME:                               Normal Ranges: LA Vol A4C:        40.1 ml    (22+/-6mL/m2) LA Vol A2C:        35.3 ml LA Vol BP:         40.9 ml LA Vol Index A4C:  22.3ml/m2 LA Vol Index A2C:  19.6 ml/m2 LA Vol Index BP:   22.7 ml/m2 LA Area A4C:       15.3 cm2 LA Area A2C:       13.2 cm2 LA Major Axis A4C: 5.0 cm LA Major Axis A2C: 4.2 cm LA Volume Index:   22.7 ml/m2  [id number] Brandon Nur DO Electronically signed on 5/3/2024 at 4:24:04 PM  ** Final **     Lower extremity venous duplex right    Result Date: 5/1/2024             [place] [id number]   Tel [phone] and Fax [phone]  Vascular Lab Report [facility] US LOWER EXTREMITY VENOUS DUPLEX RIGHT  Patient Name:      XIOMARA Maya Physician:  [id number] Rayo Jauregui MD, RPVI Study Date:        4/30/2024            Ordering Physician: [id number] DEEP PANIAGUA MRN/PID:           [id number]             Technologist:       Megan Shay RVT Accession#:        [id number]         Technologist 2: Date of Birth/Age: 1985 / 39 years Encounter#:         [id number] Gender:            M Admission Status:  Emergency            Location Performed: [facility]  Diagnosis/ICD: Pain in right leg-M79.604 CPT Codes:     13821 Peripheral venous duplex scan for DVT Limited  **CRITICAL RESULT** Critical Result: Acute DVT in the right leg. Notification called to Deep Paniagua PA-C  on 4/30/2024 at 10:52:21 AM by Megan Shay RDMS, RVT.  CONCLUSIONS: Right Lower Venous: There is acute non-occlusive deep vein thrombosis visualized in the common femoral vein. There are chronic changes visualized in the popliteal vein. Enlarged lymph node noted in the right groin. Waveforms suggestive of more distal obstruction/thrombus. May wish further means of imaging evaluation. Left Lower Venous: There are chronic changes visualized in the common femoral vein.  Comparison: Compared with study from 7/27/2023, New finding of acute non-occlusive thrombus in the right CFV.  Imaging & Doppler Findings:  Right                 Compressible      Thrombus          Flow Distal External Iliac                     None CFV                     Partial    Acute non-occlusive Continuous PFV                       Yes             None FV Proximal               Yes             None         Continuous FV Mid                    Yes             None FV Distal                 Yes             None Popliteal                 Yes            Chronic       Continuous Peroneal                  Yes             None PTV                       Yes             None  Left Compress Thrombus        Flow CFV    Yes    Chronic  Spontaneous/Phasic  [id number] Rayo Jauregui MD, RPVI Electronically signed by [id number] Rayo Jauregui MD, RPVI on 5/1/2024 at 9:13:07 AM  ** Final **     XR chest 1 view    Result Date: 4/30/2024  STUDY: Chest Radiograph;  4/30/2024 at 2:42 PM. INDICATION: Fever. COMPARISON: XR chest 1/14/2024, 11/10/2023; CTA chest 2/15/2023. ACCESSION NUMBER(S): [id number] ORDERING CLINICIAN: WENDY INFANTE TECHNIQUE:  Frontal chest was obtained at 14:42 hours. FINDINGS: CARDIOMEDIASTINAL SILHOUETTE: Cardiomediastinal silhouette is normal in size and configuration.  LUNGS: Patchy right basilar infiltrate or atelectasis with small right pleural effusion. Left basilar scar versus atelectasis. ABDOMEN: No remarkable upper abdominal findings.  BONES:  No acute osseous changes.    Patchy right basilar infiltrate or atelectasis with small right pleural effusion. Signed by Joseph Overton MD    CT thoracic spine wo IV contrast    Result Date: 4/30/2024  STUDY: CT Thoracic Spine and Lumbar Spine without IV Contrast; 4/30/2024 11:11 AM INDICATION: Midline back pain. COMPARISON: None Available. ACCESSION NUMBER(S): [id number], [id number] ORDERING CLINICIAN: RAJNI FARMER TECHNIQUE:  CT of the thoracic spine and lumbar spine was performed without intravenous or intrathecal contrast.  Sagittal and coronal reconstructions were generated.  Automated mA/kV exposure control was utilized and patient examination was performed in strict accordance with principles of ALARA. FINDINGS: Portions of the T1 vertebral body, as well as a small portion of the anterior superior aspect of T2 is cut off on this study.  No compression deformities are visualized within the thoracic vertebral bodies from T2 through T12.  No spondylolisthesis is noted.  No pedicular defects are noted.  No prominent edema is appreciated within the adjacent posterior paravertebral musculature.  Evaluation of the thecal sac is limited due to lack of myelographic contrast.  There are enlarged lymph nodes seen within the visualized portions of the mediastinum.  There are bilateral lower lobe infiltrates with tiny bilateral effusions.  Coronary artery calcifications are present. No compression deformities are visualized within the lumbar spine.  No pars defects are noted.  No significant spondylolisthesis is seen.  No prominent edema is appreciated within the visualized portions of the psoas musculature.  A long dialysis catheter is visualized within the inferior vena cava.  Evaluation of the thecal sac is limited due to lack of myelographic contrast.  There is disc bulging seen from L3 through S1.  No significant impingement is appreciated upon the neural foramina on the sagittal reconstructions.    Thoracic  spine: 1.  Portions of T1 and T2 cough on this study. 2.  No acute osseous findings seen from T3 through T12. 3.  Tiny bilateral effusions with adjacent lower lobe infiltrates. 4.  The distal adenopathy. 5.  Coronary artery calcifications. Lumbar spine: 1.  No compression deformities or spinal listhesis is noted. 2.  Disc bulging from L3 to S1.  Evaluation for spinal stenosis is limited due to lack mammographic contrast. 3.  Incidental note of a long dialysis catheter within the inferior vena cava. Signed by Deejay Hoffman MD    CT lumbar spine wo IV contrast    Result Date: 4/30/2024  STUDY: CT Thoracic Spine and Lumbar Spine without IV Contrast; 4/30/2024 11:11 AM INDICATION: Midline back pain. COMPARISON: None Available. ACCESSION NUMBER(S): [id number], [id number] ORDERING CLINICIAN: RAJNI FARMER TECHNIQUE:  CT of the thoracic spine and lumbar spine was performed without intravenous or intrathecal contrast.  Sagittal and coronal reconstructions were generated.  Automated mA/kV exposure control was utilized and patient examination was performed in strict accordance with principles of ALARA. FINDINGS: Portions of the T1 vertebral body, as well as a small portion of the anterior superior aspect of T2 is cut off on this study.  No compression deformities are visualized within the thoracic vertebral bodies from T2 through T12.  No spondylolisthesis is noted.  No pedicular defects are noted.  No prominent edema is appreciated within the adjacent posterior paravertebral musculature.  Evaluation of the thecal sac is limited due to lack of myelographic contrast.  There are enlarged lymph nodes seen within the visualized portions of the mediastinum.  There are bilateral lower lobe infiltrates with tiny bilateral effusions.  Coronary artery calcifications are present. No compression deformities are visualized within the lumbar spine.  No pars defects are noted.  No significant spondylolisthesis is seen.  No prominent  edema is appreciated within the visualized portions of the psoas musculature.  A long dialysis catheter is visualized within the inferior vena cava.  Evaluation of the thecal sac is limited due to lack of myelographic contrast.  There is disc bulging seen from L3 through S1.  No significant impingement is appreciated upon the neural foramina on the sagittal reconstructions.    Thoracic spine: 1.  Portions of T1 and T2 cough on this study. 2.  No acute osseous findings seen from T3 through T12. 3.  Tiny bilateral effusions with adjacent lower lobe infiltrates. 4.  The distal adenopathy. 5.  Coronary artery calcifications. Lumbar spine: 1.  No compression deformities or spinal listhesis is noted. 2.  Disc bulging from L3 to S1.  Evaluation for spinal stenosis is limited due to lack mammographic contrast. 3.  Incidental note of a long dialysis catheter within the inferior vena cava. Signed by Deejay Hoffman MD    XR pelvis 1-2 views    Result Date: 4/30/2024  STUDY: Pelvis Radiographs; 4/30/2024 at 9:06 AM. INDICATION: Pain. COMPARISON: CT pelvis 7/17/2023. ACCESSION NUMBER(S): [id number] ORDERING CLINICIAN: RAJNI FARMER TECHNIQUE:  One view(s) of the pelvis. FINDINGS:  The pelvic ring is intact.  There is no acute fracture.  Severe underlying osteopenia.    Slightly limited secondary to underlying osteopenia.  No acute osseous abnormality. Signed by Roe Ruiz MD       Assessment and Plan    Edwar Hemphill is a 38 y.o. male with a past medical history of end-stage renal disease on hemodialysis via right femoral TDC who resides at [facility]. 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  He later had stump debridement on 7/27 with wound VAC placement.  He had polymicrobial infection including resistant E coli, Acinetobacter baumannii and Enterobacter fecalis. He was admitted last September with positive blood cultures growing Acinetobacter baumannii and Stenotrophomonas. He went to IR for dialysis line exchange.  He then was admitted again in November where CT noted left sided fluid collection concerning for abscess.  He grew MRSA and Streptococcus, catheter was exchanged on November 13, he completed vancomycin. He then had Staph aureus CLABSI again in January of this.  POLI negative for endocarditis. HD cath removed 1/16 and replaced on 1/19 to the L groin. IV Vancomycin 750mg with HD through 2/14/24.      He comes in now with fever with low back pain radiating down the right leg. Blood cultures were obtained and came back growing MRSA. Infectious disease is following and managing antimicrobial coverage. He remains bacteremic with MRSA despite guidewire exchange of the groin tunneled HD catheter on 5/3. MRI of the lumbar spine did not show osteomyelitis/discitis and 2 D ECHO was negative for a vegetation. We dialyzed him on Wednesday. We removed his line all together for a line holiday on 5/9.     We dialyzed him Monday via a right femoral temporary dialysis line then removed it. Repeat cultures from 5/21 are without growth. There is no acute indication for dialysis today. Antimicrobial coverage with daptomycin and Teflaro noted. ID is following.  We will plan on PermCath placement as early as tomorrow if cultures remain negative followed by dialysis.  We will await final recommendations regarding his antibiotics from infectious disease.  I will suspend the daily Lokelma.       I spent a total of 40 minutes with this patient today.    Eber Bruce, DO

## 2024-05-23 NOTE — CARE PLAN
The patient's goals for the shift include      The clinical goals for the shift include Pt will maintain safety throughout this shift    Over the shift, the patient did make progress toward the following goals.

## 2024-05-24 ENCOUNTER — APPOINTMENT (OUTPATIENT)
Dept: DIALYSIS | Facility: HOSPITAL | Age: 39
End: 2024-05-24
Payer: COMMERCIAL

## 2024-05-24 ENCOUNTER — APPOINTMENT (OUTPATIENT)
Dept: CARDIOLOGY | Facility: HOSPITAL | Age: 39
DRG: 314 | End: 2024-05-24
Payer: COMMERCIAL

## 2024-05-24 LAB
ALBUMIN SERPL BCP-MCNC: 2.3 G/DL (ref 3.4–5)
ANION GAP SERPL CALC-SCNC: 17 MMOL/L (ref 10–20)
BUN SERPL-MCNC: 22 MG/DL (ref 6–23)
CALCIUM SERPL-MCNC: 8.1 MG/DL (ref 8.6–10.3)
CHLORIDE SERPL-SCNC: 95 MMOL/L (ref 98–107)
CO2 SERPL-SCNC: 23 MMOL/L (ref 21–32)
CREAT SERPL-MCNC: 7.14 MG/DL (ref 0.5–1.3)
EGFRCR SERPLBLD CKD-EPI 2021: 9 ML/MIN/1.73M*2
GLUCOSE BLD MANUAL STRIP-MCNC: 219 MG/DL (ref 74–99)
GLUCOSE BLD MANUAL STRIP-MCNC: 221 MG/DL (ref 74–99)
GLUCOSE BLD MANUAL STRIP-MCNC: 222 MG/DL (ref 74–99)
GLUCOSE BLD MANUAL STRIP-MCNC: 248 MG/DL (ref 74–99)
GLUCOSE SERPL-MCNC: 226 MG/DL (ref 74–99)
PHOSPHATE SERPL-MCNC: 5.6 MG/DL (ref 2.5–4.9)
POTASSIUM SERPL-SCNC: 4.2 MMOL/L (ref 3.5–5.3)
SODIUM SERPL-SCNC: 131 MMOL/L (ref 136–145)

## 2024-05-24 PROCEDURE — 77001 FLUOROGUIDE FOR VEIN DEVICE: CPT

## 2024-05-24 PROCEDURE — 99231 SBSQ HOSP IP/OBS SF/LOW 25: CPT | Performed by: INTERNAL MEDICINE

## 2024-05-24 PROCEDURE — 02H633Z INSERTION OF INFUSION DEVICE INTO RIGHT ATRIUM, PERCUTANEOUS APPROACH: ICD-10-PCS | Performed by: RADIOLOGY

## 2024-05-24 PROCEDURE — C1769 GUIDE WIRE: HCPCS

## 2024-05-24 PROCEDURE — 2500000004 HC RX 250 GENERAL PHARMACY W/ HCPCS (ALT 636 FOR OP/ED): Mod: JZ | Performed by: INTERNAL MEDICINE

## 2024-05-24 PROCEDURE — 77001 FLUOROGUIDE FOR VEIN DEVICE: CPT | Performed by: RADIOLOGY

## 2024-05-24 PROCEDURE — 76937 US GUIDE VASCULAR ACCESS: CPT

## 2024-05-24 PROCEDURE — 2500000004 HC RX 250 GENERAL PHARMACY W/ HCPCS (ALT 636 FOR OP/ED): Performed by: RADIOLOGY

## 2024-05-24 PROCEDURE — 2500000002 HC RX 250 W HCPCS SELF ADMINISTERED DRUGS (ALT 637 FOR MEDICARE OP, ALT 636 FOR OP/ED): Performed by: SURGERY

## 2024-05-24 PROCEDURE — 2780000003 HC OR 278 NO HCPCS

## 2024-05-24 PROCEDURE — 36558 INSERT TUNNELED CV CATH: CPT | Performed by: RADIOLOGY

## 2024-05-24 PROCEDURE — 99152 MOD SED SAME PHYS/QHP 5/>YRS: CPT | Performed by: RADIOLOGY

## 2024-05-24 PROCEDURE — C9113 INJ PANTOPRAZOLE SODIUM, VIA: HCPCS

## 2024-05-24 PROCEDURE — 80069 RENAL FUNCTION PANEL: CPT | Performed by: INTERNAL MEDICINE

## 2024-05-24 PROCEDURE — 36415 COLL VENOUS BLD VENIPUNCTURE: CPT | Performed by: INTERNAL MEDICINE

## 2024-05-24 PROCEDURE — 36558 INSERT TUNNELED CV CATH: CPT

## 2024-05-24 PROCEDURE — 82947 ASSAY GLUCOSE BLOOD QUANT: CPT

## 2024-05-24 PROCEDURE — C1750 CATH, HEMODIALYSIS,LONG-TERM: HCPCS

## 2024-05-24 PROCEDURE — 2500000004 HC RX 250 GENERAL PHARMACY W/ HCPCS (ALT 636 FOR OP/ED): Performed by: NURSE PRACTITIONER

## 2024-05-24 PROCEDURE — 1200000002 HC GENERAL ROOM WITH TELEMETRY DAILY

## 2024-05-24 PROCEDURE — 6350000001 HC RX 635 EPOETIN >10,000 UNITS: Mod: JW | Performed by: INTERNAL MEDICINE

## 2024-05-24 PROCEDURE — 2500000004 HC RX 250 GENERAL PHARMACY W/ HCPCS (ALT 636 FOR OP/ED)

## 2024-05-24 PROCEDURE — 8010000001 HC DIALYSIS - HEMODIALYSIS PER DAY

## 2024-05-24 PROCEDURE — 0JHL3XZ INSERTION OF TUNNELED VASCULAR ACCESS DEVICE INTO RIGHT UPPER LEG SUBCUTANEOUS TISSUE AND FASCIA, PERCUTANEOUS APPROACH: ICD-10-PCS | Performed by: RADIOLOGY

## 2024-05-24 PROCEDURE — 99153 MOD SED SAME PHYS/QHP EA: CPT | Performed by: RADIOLOGY

## 2024-05-24 PROCEDURE — 2500000005 HC RX 250 GENERAL PHARMACY W/O HCPCS: Performed by: RADIOLOGY

## 2024-05-24 RX ORDER — MIDODRINE HYDROCHLORIDE 5 MG/1
5 TABLET ORAL
Status: DISCONTINUED | OUTPATIENT
Start: 2024-05-25 | End: 2024-05-26 | Stop reason: HOSPADM

## 2024-05-24 RX ADMIN — EPOETIN ALFA-EPBX 8000 UNITS: 10000 INJECTION, SOLUTION INTRAVENOUS; SUBCUTANEOUS at 23:13

## 2024-05-24 RX ADMIN — CEFTAROLINE FOSAMIL 200 MG: 600 POWDER, FOR SOLUTION INTRAVENOUS at 03:03

## 2024-05-24 RX ADMIN — PANTOPRAZOLE SODIUM 40 MG: 40 INJECTION, POWDER, FOR SOLUTION INTRAVENOUS at 21:20

## 2024-05-24 RX ADMIN — MIDAZOLAM HYDROCHLORIDE 1 MG: 1 INJECTION, SOLUTION INTRAMUSCULAR; INTRAVENOUS at 14:28

## 2024-05-24 RX ADMIN — PANTOPRAZOLE SODIUM 40 MG: 40 INJECTION, POWDER, FOR SOLUTION INTRAVENOUS at 09:37

## 2024-05-24 RX ADMIN — Medication 2 L/MIN: at 14:20

## 2024-05-24 RX ADMIN — CEFTAROLINE FOSAMIL 200 MG: 600 POWDER, FOR SOLUTION INTRAVENOUS at 21:20

## 2024-05-24 RX ADMIN — DAPTOMYCIN IN SODIUM CHLORIDE 700 MG: 700 INJECTION, SOLUTION INTRAVENOUS at 23:12

## 2024-05-24 RX ADMIN — LIDOCAINE HYDROCHLORIDE 20 ML: 20 INJECTION, SOLUTION INFILTRATION; PERINEURAL at 14:30

## 2024-05-24 RX ADMIN — HEPARIN SODIUM 3100 UNITS: 1000 INJECTION INTRAVENOUS; SUBCUTANEOUS at 18:36

## 2024-05-24 RX ADMIN — FENTANYL CITRATE 50 MCG: 0.05 INJECTION, SOLUTION INTRAMUSCULAR; INTRAVENOUS at 14:31

## 2024-05-24 RX ADMIN — HEPARIN SODIUM 3100 UNITS: 1000 INJECTION INTRAVENOUS; SUBCUTANEOUS at 18:35

## 2024-05-24 ASSESSMENT — ENCOUNTER SYMPTOMS
ALLERGIC/IMMUNOLOGIC NEGATIVE: 1
GASTROINTESTINAL NEGATIVE: 1
HEMATOLOGIC/LYMPHATIC NEGATIVE: 1
RESPIRATORY NEGATIVE: 1
NEUROLOGICAL NEGATIVE: 1
ENDOCRINE NEGATIVE: 1
PSYCHIATRIC NEGATIVE: 1
CARDIOVASCULAR NEGATIVE: 1
BACK PAIN: 1
FATIGUE: 1
EYES NEGATIVE: 1

## 2024-05-24 ASSESSMENT — COGNITIVE AND FUNCTIONAL STATUS - GENERAL
DRESSING REGULAR UPPER BODY CLOTHING: A LOT
MOVING FROM LYING ON BACK TO SITTING ON SIDE OF FLAT BED WITH BEDRAILS: A LITTLE
STANDING UP FROM CHAIR USING ARMS: TOTAL
DRESSING REGULAR LOWER BODY CLOTHING: A LOT
DAILY ACTIVITIY SCORE: 15
MOVING TO AND FROM BED TO CHAIR: TOTAL
WALKING IN HOSPITAL ROOM: TOTAL
TOILETING: A LOT
MOBILITY SCORE: 10
TURNING FROM BACK TO SIDE WHILE IN FLAT BAD: A LITTLE
PERSONAL GROOMING: A LITTLE
CLIMB 3 TO 5 STEPS WITH RAILING: TOTAL
HELP NEEDED FOR BATHING: A LOT

## 2024-05-24 ASSESSMENT — PAIN SCALES - GENERAL
PAINLEVEL_OUTOF10: 0 - NO PAIN
PAINLEVEL_OUTOF10: 0 - NO PAIN

## 2024-05-24 ASSESSMENT — PAIN - FUNCTIONAL ASSESSMENT
PAIN_FUNCTIONAL_ASSESSMENT: NO/DENIES PAIN
PAIN_FUNCTIONAL_ASSESSMENT: 0-10
PAIN_FUNCTIONAL_ASSESSMENT: 0-10

## 2024-05-24 NOTE — PROGRESS NOTES
Edwar Hemphill is a 39 y.o. male on day 24 of admission presenting with SIRS (systemic inflammatory response syndrome) (Multi).      Subjective   No issues        Objective     Last Recorded Vitals  /76 (Patient Position: Lying)   Pulse 92   Temp 36.4 °C (97.5 °F) (Temporal)   Resp 18   Wt 68.5 kg (151 lb)   SpO2 94%   Intake/Output last 3 Shifts:    Intake/Output Summary (Last 24 hours) at 5/24/2024 1852  Last data filed at 5/24/2024 1831  Gross per 24 hour   Intake 1000 ml   Output 1405 ml   Net -405 ml       Admission Weight  Weight: 65.8 kg (145 lb) (04/30/24 0825)    Daily Weight  05/23/24 : 68.5 kg (151 lb)    Image Results  IR CVC removal  These images are not reportable by radiology and will not be interpreted   by  Radiologists.      PHYSICAL EXAM  NEUROLOGICAL: No abnormal movements, no changes from before  HEENT: Head atraumatic, perrl,eomi, no oral lesions, no ear rash   NECK: Supple, no ln, jvp flat, thyroid palp  HEART: S1, S2, no added sound  LUNGS: CTAB, no crackles, no rales, no wheezing, no dullness to percussion, sym exp  EXTREMITIES: no edema  ABDOMEN: Soft, nontender, bowel sound positive, no organomegaly  SKIN: No change  EYES: No changes, perrl, eomi,   ENT: No change    JOINT: No change  Relevant Results               Assessment/Plan                  Principal Problem:    SIRS (systemic inflammatory response syndrome) (Multi)    Anemia  Dm  Esrd     Dc once arragements made at [facility]      Malnutrition Diagnosis Status: New  Malnutrition Diagnosis: Severe malnutrition related to chronic disease or condition  As Evidenced by: 18% weight loss over 8 months, intake <75% meals for > 1 month.  I agree with the dietitian's malnutrition diagnosis.       t33  Mauricio Estrada MD

## 2024-05-24 NOTE — H&P
History Of Present Illness  Edwar Hemphill is a 39 y.o. male with PMHx significant for ESRD, NICM,  HFrEF (LVEF 35-40% on 5/2024 POLI), anemia, DM2, Left AKA, RUE amputation, HTN, PVD, GERD, admitted 4/30/24 with DKA, CLABSI 2/2 MRSA line exchanged 5/3. Not having a full line holiday because of risk of losing HD access. TTE without endocarditis. Repeat blood cx 5/5 and 5/7 still positive. HD cath removed on 5/9. 5/10 blood cx still positive. New temp HD cath placed 5/13. POLI negative for endocarditis 5/14. Blood cx 5/14 positive. Dapto/Ceftaroline started 5/16 and then 5/17 blood cx still positive. Temp HD cath removed 5/20. Here for right femoral THD catheter.      Past Medical History  He has a past medical history of Chronic kidney disease, Diabetes mellitus (Multi), ESRD (end stage renal disease) (Multi), Essential (primary) hypertension (05/26/2017), GERD (gastroesophageal reflux disease), Hyperlipidemia, and Hypothyroidism.    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     Social History  He reports that he has been smoking cigarettes. He has a 3.8 pack-year smoking history. He has never used smokeless tobacco. No history on file for alcohol use and drug use.    Family History  Family History   Problem Relation Name Age of Onset    Other (DIABETES DUE TO UNDERLYING CONDITION WITH MICROALBUMINURIA) Father      Other (DIABETES DUE TO UNDERLYING CONDITION WITH MICROALBUMINURIA [Other]) Other AUNT     Other (DIABETES DUE TO UNDERLYING CONDITION WITH  MICROALBUMINURIA [Other]) Other GP         Allergies  Cashew nut    Home Medications  Current Facility-Administered Medications on File Prior to Visit   Medication Dose Route Frequency Provider Last Rate Last Admin    [Held by provider] apixaban (Eliquis) tablet 5 mg  5 mg oral BID OSBALDO Wilson DNP   5 mg at 05/03/24 2156    B complex-vitamin C-folic acid (Nephrocaps) capsule 1 capsule  1 capsule oral Daily OSBALDO Wilson DNP   1 capsule at 05/23/24 0839    ceftaroline (Teflaro) 200 mg in dextrose 5 % in water (D5W) 50 mL IV  200 mg intravenous q8h Sheldon Saleh MD   Stopped at 05/24/24 0403    cyclobenzaprine (Flexeril) tablet 5 mg  5 mg oral TID PRN El Estrada MD   5 mg at 05/21/24 2358    DAPTOmycin (Cubicin) 700 mg/100 mL  mg  700 mg intravenous q48h Sheldon Saleh MD   Stopped at 05/22/24 1823    dextrose 50 % injection 12.5 g  12.5 g intravenous q15 min PRN Veronica De La Paz MD   12.5 g at 05/23/24 0822    dextrose 50 % injection 25 g  25 g intravenous q15 min PRN Mike Santana MD   25 g at 05/22/24 0955    epoetin tyson-epbx (Retacrit) injection 8,000 Units  100 Units/kg subcutaneous Every Mon/Wed/Fri Eber Bruce DO   8,000 Units at 05/22/24 1756    fentaNYL PF (Sublimaze) injection    PRN Ronaldo Dempsey MD   25 mcg at 05/13/24 1513    glucagon (Glucagen) injection 1 mg  1 mg intramuscular q15 min PRN Veronica De La Paz MD        heparin 1,000 unit/mL injection 3,100 Units  3,100 Units intra-catheter After Dialysis OSBALDO Wilson DNP   3,100 Units at 05/20/24 1223    heparin 1,000 unit/mL injection 3,100 Units  3,100 Units intra-catheter After Dialysis OSBALDO Wilson DNP   3,100 Units at 05/20/24 1223    heparin 1,000 unit/mL injection    PRN Ronaldo Dempsey MD   1,800 Units at 05/13/24 2321    insulin lispro (HumaLOG) injection 0-5 Units  0-5 Units subcutaneous TID Veronica De La Paz MD   1 Units at 05/16/24 6364     levothyroxine (Synthroid, Levoxyl) tablet 125 mcg  125 mcg oral Daily before breakfast OSBALDO Wilson DNP   125 mcg at 05/23/24 0605    lidocaine (Xylocaine) 20 mg/mL (2 %) injection    PRN Ronaldo Dempsey MD   5 mL at 05/13/24 1513    lidocaine 4 % patch 1 patch  1 patch transdermal Daily Michelle Drummond PA-C   1 patch at 05/23/24 0839    lidocaine PF (Xylocaine) 10 mg/mL (1 %) injection    PRN Ronaldo Dempsey MD   5 mL at 05/03/24 1816    loperamide (Imodium) capsule 2 mg  2 mg oral q4h PRN OSBALDO Wilson DNP        midazolam (Versed) injection    PRN Ronaldo Dempsey MD   0.5 mg at 05/13/24 1513    nitroglycerin (Nitrostat) SL tablet 0.4 mg  0.4 mg sublingual q5 min PRN OSBALDO Wilson DNP        ondansetron (Zofran) injection 4 mg  4 mg intravenous q6h PRN OSBALDO Wilson DNP        oxyCODONE (Roxicodone) immediate release tablet 5 mg  5 mg oral q4h PRN OSBALDO Wilson DNP   5 mg at 05/23/24 0855    oxygen (O2) therapy    Continuous PRN Ronaldo Dempsey ,000 mL/hr at 05/03/24 1758 2 L/min at 05/03/24 1758    oxygen (O2) therapy   inhalation Continuous PRN - O2/gases OSBALDO Rousseau        oxygen (O2) therapy   inhalation Continuous PRN - O2/gases OSBALDO Rousseau        pantoprazole (ProtoNix) injection 40 mg  40 mg intravenous BID OSBALDO Marin   40 mg at 05/24/24 0937    polyethylene glycol (Glycolax, Miralax) packet 17 g  17 g oral Daily OSBALDO Wilson DNP   17 g at 05/22/24 1014    sennosides (Senokot) tablet 8.6 mg  8.6 mg oral BID OSBALDO Wilson DNP   8.6 mg at 05/22/24 1029    sevelamer carbonate (Renvela) tablet 2,400 mg  2,400 mg oral TID OSBALDO Wilson DNP   2,400 mg at 05/23/24 1751    simethicone (Mylicon) chewable tablet 80 mg  80 mg oral q8h PRN OSBALDO Wilson DNP        [Held by provider] sodium  zirconium cyclosilicate (Lokelma) packet 10 g  10 g oral Daily Eber Bruce DO   10 g at 05/23/24 0839     Current Outpatient Medications on File Prior to Visit   Medication Sig Dispense Refill    acetaminophen (Tylenol) 325 mg tablet Take 2 tablets (650 mg) by mouth every 4 hours if needed for moderate pain (4 - 6). 30 tablet 0    apixaban (Eliquis) 5 mg tablet Take 1 tablet (5 mg) by mouth twice a day.      atorvastatin (Lipitor) 40 mg tablet Take 1 tablet (40 mg) by mouth once daily.      B complex-vitamin C-folic acid (Nephrocaps) 1 mg capsule Take 1 capsule by mouth once daily.      Basaglar KwikPen U-100 Insulin 100 unit/mL (3 mL) pen Inject 12 Units under the skin once daily at bedtime.      epoetin tyson-epbx (Retacrit) 10,000 unit/mL injection Inject 1 mL (10,000 Units) under the skin 3 times a week. 12 mL 11    insulin glargine (Lantus) 100 unit/mL injection Inject 12 Units under the skin once daily at bedtime. Take as directed per insulin instructions. (Patient not taking: Reported on 5/9/2024) 3.6 mL 11    insulin lispro (HumaLOG) 100 unit/mL injection Inject 0-0.15 mL (0-15 Units) under the skin 3 times a day with meals. Take as directed per insulin instructions. 13.5 mL 11    levothyroxine (Synthroid, Levoxyl) 125 mcg tablet Take 1 tablet (125 mcg) by mouth once daily in the morning. Take before meals.      loperamide (Imodium) 2 mg capsule Take 2 capsules (4 mg) by mouth every 2 hours if needed for diarrhea (FOR LOOSE STOLLS. 2 TABLETS AFTER EVERY EPISODE. DO NOT EXCEED 16MG TOTAL IN 24 HOURS.).      melatonin 3 mg tablet Take 1 tablet (3 mg) by mouth as needed at bedtime for sleep for up to 15 doses. 15 tablet 0    nitroglycerin (Nitrostat) 0.4 mg SL tablet Place 1 tablet (0.4 mg) under the tongue every 5 minutes if needed for chest pain.      oxyCODONE-acetaminophen (Percocet) 5-325 mg tablet Take 1 tablet by mouth every 6 hours if needed for severe pain (7 - 10). 10 tablet 0    pantoprazole  (ProtoNix) 40 mg EC tablet Take 1 tablet (40 mg) by mouth once daily in the morning. Take before meals. Do not crush, chew, or split. Do not start before November 17, 2023. 30 tablet 11    pantoprazole (ProtoNix) 40 mg EC tablet Take 1 tablet (40 mg) by mouth once daily in the morning. Take before meals. Do not crush, chew, or split. (Patient not taking: Reported on 4/16/2024) 30 tablet 11    polyethylene glycol (Glycolax, Miralax) 17 gram packet Take 17 g by mouth once daily. DISSOLVE IN 4-8 OUNCES OF LIQUID AND TAKE ORALLY AS DIRECTED      sennosides (Senokot) 8.6 mg tablet Take 1 tablet (8.6 mg) by mouth twice a day.      sevelamer carbonate (Renvela) 800 mg tablet Take 3 tablets (2,400 mg) by mouth 3 times a day with meals.      simethicone (Mylicon) 80 mg chewable tablet Chew 1 tablet (80 mg) every 8 hours if needed for flatulence.            Inpatient Medications:            Review of Systems   Constitutional:  Positive for fatigue.   HENT: Negative.     Eyes: Negative.    Respiratory: Negative.     Cardiovascular: Negative.    Gastrointestinal: Negative.    Endocrine: Negative.    Genitourinary: Negative.    Musculoskeletal:  Positive for back pain.   Skin: Negative.    Allergic/Immunologic: Negative.    Neurological: Negative.    Hematological: Negative.    Psychiatric/Behavioral: Negative.            Physical Exam    General:  Patient is awake, alert, and oriented.  Patient is in no acute distress.  HEENT:  Pupils equal and reactive.  Normocephalic.  Moist mucosa.    Neck:  No JVD.   Cardiovascular:  Regular rate and rhythm.  Normal S1 and S2. No murmurs/rubs/gallops.  RUE amputation.   Pulmonary:  Clear to auscultation bilaterally.  Abdomen:  Soft. Non-tender.   Non-distended.  Positive bowel sounds.  Lower Extremities:  L AKA  Neurologic:  Cranial nerves II-XII grossly intact.   No focal deficit.   Skin: Skin warm and dry, no lesions. Normal skin turgor.   Psychiatric: Normal affect.     Sedation  Plan    ASA 3     Mallampati class: IV.         Last Recorded Vitals  There were no vitals taken for this visit.         Vitals from the Past 24 Hours  Heart Rate:  [83-86]   Temp:  [36.4 °C (97.6 °F)-36.8 °C (98.2 °F)]   Resp:  [18]   BP: (113-132)/(73-80)   SpO2:  [94 %-95 %]          Relevant Results    Labs    CBC:   Recent Labs     05/21/24  1129 05/20/24  1126 05/19/24  1220 05/16/24  1115 05/14/24  0625 05/11/24  0631   WBC 5.0 5.0 5.8 6.6 7.7 7.0   HGB 7.2* 7.7* 7.9* 7.5* 7.8* 8.2*   HCT 23.6* 25.3* 25.7* 25.3* 25.4* 27.1*    149* 207 251 338 304   MCV 90 89 89 95 92 94     BMP/CMP:   Recent Labs     05/24/24  0651 05/23/24  0611 05/21/24  1129 05/20/24  1126 05/19/24  0619 05/17/24  1521 05/03/24  1442 04/30/24  1345 01/19/24  0536 01/18/24  0503 01/15/24  0632 01/14/24  1319 11/11/23  0631 11/10/23  1207 09/13/23  1618 09/13/23  1351 08/24/23  0614 08/22/23  0822   * 134* 132* 134* 132* 136   < > 132*   < > 136   < > 131*   < > 133*   < > 136   < > 137   K 4.2 4.1 4.0 3.5 3.9 3.6   < > 4.7   < > 3.6   < > 4.4   < > 5.3   < > 7.0*   < > 4.6   CL 95* 97* 98 98 98 99   < > 87*   < > 94*   < > 93*   < > 92*   < > 106   < > 103   BUN 22 19 14 10 15 15   < > 72*   < > 46*   < > 27*   < > 47*   < > 78*   < > 25*   CREATININE 7.14* 6.27* 4.69* 3.26* 4.76* 3.83*   < > 9.76*   < > 6.97*   < > 7.74*   < > 7.92*   < > 10.24*   < > 5.04*   CO2 23 26 25 29 23 28   < > 16*   < > 24   < > 25   < > 24   < > 17*   < > 24   CALCIUM 8.1* 7.9* 8.6 7.8* 8.7 8.1*   < > 9.3   < > 9.0   < > 9.8   < > 8.8   < > 8.5*   < > 6.4*   PROT  --   --   --   --   --   --   --  7.5  --  6.4  --  8.3*  --  7.1  --  6.6  --  4.7*   BILITOT  --   --   --   --   --   --   --  1.3*  --  0.5  --  0.6  --  0.7  --  0.5  --  0.5   ALKPHOS  --   --   --   --   --   --   --  156*  --  133*  --  183*  --  401*  --  228*  --  207*   ALT  --   --   --   --   --   --   --  10  --  9*  --  10  --  10  --  <3*  --  8*   AST  --   --   --   --    "--   --   --  10  --  12  --  13  --  10  --  13  --  16   GLUCOSE 226* 48* 102* 68* 124* 108*   < > 241*   < > 131*   < > 85   < > 131*   < > 46*   < > 103*    < > = values in this interval not displayed.      Lipid Panel:   Recent Labs     11/11/22  1316   CHOL 111   HDL 67.6   CHHDL 1.6   VLDL 5   TRIG 27*     Cardiac       No lab exists for component: \"CK\", \"CKMBP\"   Hemoglobin A1C:   Recent Labs     03/30/24  0549 09/14/23  1014 08/07/23  2212 07/06/23  0649 06/19/23  0504 11/11/22  1316 10/15/22  1535 02/24/22  0706 08/12/21  0418 06/10/21  0050 02/28/21  0634 11/10/20  0551   HGBA1C 6.8* 5.2 5.8* 8.0* 8.6* 6.9* 7.3* 8.4* 5.8 6.0* 7.9* 11.4*     TSH/ Free T4:   Recent Labs     11/11/22  1316   TSH 2.58     Iron:   Recent Labs     09/19/23  0731 09/18/23  0758   FERRITIN  --  1,084*   TIBC 100* 74G*   IRONSAT 43 100*     Coag:     ABO: No results found for: \"ABO\"    Past Cardiology Tests (Last 3 Years):    EKG:  Recent Labs     05/04/24  1015 01/16/24  0948 01/15/24  0640 01/14/24  1335   ATRRATE 178 98 104 150   VENTRATE 143 98 104 150   PRINT  --  150 152 130   QRSDUR 76 82 100 64   QTCFRED 363 433 432 347   QTCCALCB 419 469 473 404     Encounter Date: 04/30/24   Electrocardiogram, 12-lead PRN ACS symptoms   Result Value    Ventricular Rate 143    Atrial Rate 178    QRS Duration 76    QT Interval 272    QTC Calculation(Bazett) 419    R Axis 27    T Axis 61    QRS Count 24    Q Onset 221    T Offset 357    QTC Fredericia 363    Narrative    Atrial fibrillation with rapid ventricular response with premature ventricular or aberrantly conducted complexes  Nonspecific ST and T wave abnormality , probably digitalis effect  Abnormal ECG  When compared with ECG of 16-JAN-2024 07:06,  Atrial fibrillation has replaced Sinus rhythm  ST elevation now present in Inferior leads  Nonspecific T wave abnormality, worse in Lateral leads  Confirmed by Brandon Nur ([id number]) on 5/20/2024 7:36:36 AM     Echo:  Echocardiogram: "   Transthoracic Echo (TTE) Complete 05/03/2024    Narrative  [facility], [address] [place] [address]  Tel [phone] and Fax [phone]    TRANSTHORACIC ECHOCARDIOGRAM REPORT      Patient Name:      XIOMARA TRUJILLO PHYLLIS Maya Physician:    [id number] Brandon Nur DO  Study Date:        5/3/2024             Ordering Provider:    [id number] ALKA VIEYRA  MRN/PID:           [id number]             Fellow:  Accession#:        [id number]         Nurse:  Date of Birth/Age: 1985 / 39 years Sonographer:          Sy Lyons NAKITA  Gender:            M                    Additional Staff:  Height:            175.00 cm            Admit Date:  Weight:            65.80 kg             Admission Status:     Inpatient -  Routine  BSA / BMI:         1.80 m2 / 21.49      Encounter#:           [id number]  kg/m2  Department Location:  [facility] Non  Invasive  Blood Pressure: 112 /74 mmHg    Study Type:    TRANSTHORACIC ECHO (TTE) COMPLETE  Diagnosis/ICD: Endocarditis, valve unspecified-I38  Indication:    endocarditis  CPT Code:      Echo Complete w Full Doppler-[id number]    Patient History:  Pertinent History: HTN and Hyperlipidemia.    Study Detail: The following Echo studies were performed: M-Mode, Doppler, color  flow and 2D.      PHYSICIAN INTERPRETATION:  Left Ventricle: The left ventricular systolic function is moderately decreased, with an estimated ejection fraction of 35-40%. There are no regional wall motion abnormalities. The left ventricular cavity size is normal. Abnormal (paradoxical) septal motion, consistent with left bundle branch block. Spectral Doppler shows an impaired relaxation pattern of left ventricular diastolic filling.  Left Atrium: The left atrium is normal in size.  Right Ventricle: The right ventricle is normal in size. There is normal right ventricular global systolic function.  Right Atrium: The right atrium is normal in size.  Aortic Valve: The aortic valve is probably  trileaflet. There is no evidence of aortic valve regurgitation.  Mitral Valve: The mitral valve is mildly thickened. There is trace mitral valve regurgitation.  Tricuspid Valve: The tricuspid valve is structurally normal. No evidence of tricuspid regurgitation.  Pulmonic Valve: The pulmonic valve is structurally normal. There is no indication of pulmonic valve regurgitation.  Pericardium: There is no pericardial effusion noted.  Aorta: The aortic root is normal.  In comparison to the previous echocardiogram(s): Compared with study from 1/16/2024, LV function has declined. Was previously normal.      CONCLUSIONS:  1. Left ventricular systolic function is moderately decreased with a 35-40% estimated ejection fraction.  2. Abnormal septal motion consistent with left bundle branch block.  3. Spectral Doppler shows an impaired relaxation pattern of left ventricular diastolic filling.  4. No vegetations visualized within the limitations of this study.  5. Compared with study from 1/16/2024, LV function has declined. Was previously normal.    QUANTITATIVE DATA SUMMARY:  LA VOLUME:  Normal Ranges:  LA Vol A4C:        40.1 ml    (22+/-6mL/m2)  LA Vol A2C:        35.3 ml  LA Vol BP:         40.9 ml  LA Vol Index A4C:  22.3ml/m2  LA Vol Index A2C:  19.6 ml/m2  LA Vol Index BP:   22.7 ml/m2  LA Area A4C:       15.3 cm2  LA Area A2C:       13.2 cm2  LA Major Axis A4C: 5.0 cm  LA Major Axis A2C: 4.2 cm  LA Volume Index:   22.7 ml/m2      [id number] Brandon Nur DO  Electronically signed on 5/3/2024 at 4:24:04 PM        ** Final **    Ejection Fractions:  LV EF   Date/Time Value Ref Range Status   01/16/2024 04:23 PM 68     11/14/2023 11:37 AM 54       Cath:  Coronary Angiography: No results found for this or any previous visit from the past 1800 days.    Right Heart Cath: No results found for this or any previous visit from the past 1800 days.    Stress Test:  Nuclear:No results found for this or any previous visit from the past 1800  days.    Metabolic Stress: No results found for this or any previous visit from the past 1800 days.    Cardiac Imaging:  Cardiac Scoring: No results found for this or any previous visit from the past 1800 days.    Cardiac MRI: No results found for this or any previous visit from the past 1800 days.         Assessment/Plan  Assessment/Plan   Active Problems:  There are no active Hospital Problems.    #CLABSI (HD cath POA) due to MRSA - line exchanged 5/3. Not having a full line holiday because of risk of losing HD access. TTE without endocarditis. Repeat blood cx 5/5 and 5/7 still positive. HD cath removed on 5/9. 5/10 blood cx still positive. New temp HD cath placed 5/13. POLI negative for endocarditis 5/14. Blood cx 5/14 positive. Dapto/Ceftaroline started 5/16 and then 5/17 blood cx still positive. Temp HD cath removed 5/20.   -Right femoral THD placement with Dr. Dempsey today 5/24/24         I spent 30 minutes in the professional and overall care of this patient.      Cynthia Pate, AMARILYS-CNP

## 2024-05-24 NOTE — PRE-PROCEDURE NOTE
..Report from Sending RN:    Report From: ORLANDO Cline  Recent Surgery of Procedure: Yes, Catheter placement  Baseline Level of Consciousness (LOC): x4  Oxygen Use: No  Type: room air  Diabetic: Yes  Last BP Med Given Day of Dialysis: none  Last Pain Med Given: none  Lab Tests to be Obtained with Dialysis: No  Blood Transfusion to be Given During Dialysis: No  Available IV Access: Yes  Medications to be Administered During Dialysis: No  Continuous IV Infusion Running: No  Restraints on Currently or in the Last 24 Hours: No  Hand-Off Communication: verbal  Dialysis Catheter Dressing: yes  Last Dressing Change: 05/24/24

## 2024-05-24 NOTE — PROGRESS NOTES
"  INFECTIOUS DISEASE DAILY PROGRESS NOTE    SUBJECTIVE:    No overnight events. No new complaints. Afebrile. No rash/itching/diarrhea.    OBJECTIVE:  VITALS (Last 24 Hours)  /76 (Patient Position: Lying)   Pulse 86   Temp 36.6 °C (97.9 °F) (Temporal)   Resp 18   Ht 1.753 m (5' 9.02\")   Wt 68.5 kg (151 lb)   SpO2 94%   BMI 22.29 kg/m²     PHYSICAL EXAM:  Gen - laying in bed  Abd - soft, no tenderness, BS present  RLE - s/p AKA  RUE - s/p amputation  Skin - no rash     ABX: IV Daptomycin and Ceftaroline    LABS:  Lab Results   Component Value Date    WBC 5.0 05/21/2024    HGB 7.2 (L) 05/21/2024    HCT 23.6 (L) 05/21/2024    MCV 90 05/21/2024     05/21/2024     Lab Results   Component Value Date    GLUCOSE 226 (H) 05/24/2024    CALCIUM 8.1 (L) 05/24/2024     (L) 05/24/2024    K 4.2 05/24/2024    CO2 23 05/24/2024    CL 95 (L) 05/24/2024    BUN 22 05/24/2024    CREATININE 7.14 (H) 05/24/2024     Results from last 72 hours   Lab Units 05/24/24  0651   ALBUMIN g/dL 2.3*     Estimated Creatinine Clearance: 13.3 mL/min (A) (by C-G formula based on SCr of 7.14 mg/dL (H)).    ASSESSMENT/PLAN:     CLABSI (HD cath POA) due to MRSA - line exchanged 5/3. Not having a full line holiday because of risk of losing HD access. TTE without endocarditis. Repeat blood cx 5/5 and 5/7 still positive. HD cath removed on 5/9. 5/10 blood cx still positive. New temp HD cath placed 5/13. POLI negative for endocarditis 5/14. Blood cx 5/14 positive. Dapto/Ceftaroline started 5/16 and then 5/17 blood cx still positive. Temp HD cath removed 5/20.  Lumbar Spine Pain - CT without epidural abscess. MRI without OM/discitis.  DM II with DKA - DKA resolved     Combination abx therapy Daptomycin/Ceftaroline while here.    OK for tunneled HD catheter today.    On discharge he will be on IV Daptomycin 700mg with HD through 6/18/24 for 4 weeks total treatment.    Also he should be on PO Linezolid 600mg BID for 2 weeks from " discharge.    Monitoring for adverse effects of abx such as rash/itching/diarrhea - none apparent.     Will sign off. Please call back with questions. Thanks! D/w Dr. Janis Jauregui MD  [facility]  Office #[phone]

## 2024-05-24 NOTE — PROGRESS NOTES
05/24/24 1243   Discharge Planning   Patient expects to be discharged to: [facility]     Per notes patient discharging on IV abx , I am reaching out to the facility through careport to make sure they can accept patient still

## 2024-05-24 NOTE — PROGRESS NOTES
Edwar Hemphill is a 39 y.o. male on day 24 of admission presenting with SIRS (systemic inflammatory response syndrome) (Multi).    Subjective   Mr. Hemphill is a 39-year-old man with a history of ESRD on hemodialysis.  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  He has had many bloodstream infections related to catheters, again during this admission.  He has MRSA, permcath was removed but he required a temporary femoral hemodialysis catheter due to persistent bacteremia.  TTE without endocarditis, then had a POLI as well on May 14.  CT was without an epidural abscess, MRI was done that was also negative for infection.    Resting comfortably in bed.   No respiratory complaints.   Blood cultures have cleared. He is awaiting permcath placement today and is planned for iHD.  GILMAR.   Chart/labs/meds/notes/imaging/VS reviewed.       Objective     Physical Exam  Constitutional:       Appearance: Normal appearance.   HENT:      Mouth/Throat:      Mouth: Mucous membranes are moist.   Eyes:      Extraocular Movements: Extraocular movements intact.      Pupils: Pupils are equal, round, and reactive to light.   Cardiovascular:      Rate and Rhythm: Regular rhythm.      Heart sounds: S1 normal and S2 normal.   Pulmonary:      Breath sounds: Poor effort, no crackles  Abdominal:      Comments: Soft, NT/ND, no masses, normal bowel sounds   Genitourinary:     Comments: No dodd  Musculoskeletal:      Right lower leg: No edema.      Left lower leg: No edema.   Right upper extremity amputation  Skin:     General: Skin is warm and dry.   Neurological:      General: No focal deficit present.      Mental Status: She is alert and oriented to person, place, and time.   Psychiatric:         Behavior: Behavior normal.        Meds    Current Facility-Administered Medications:     [Held by provider] apixaban (Eliquis) tablet 5 mg, 5 mg, oral, BID, AMARILYS Wilson-CNP, DNP, 5 mg at 05/03/24 3378    B  complex-vitamin C-folic acid (Nephrocaps) capsule 1 capsule, 1 capsule, oral, Daily, OSBALDO Wilson, DNP, 1 capsule at 05/23/24 0839    ceftaroline (Teflaro) 200 mg in dextrose 5 % in water (D5W) 50 mL IV, 200 mg, intravenous, q8h, Sheldon Saleh MD, Stopped at 05/24/24 0403    cyclobenzaprine (Flexeril) tablet 5 mg, 5 mg, oral, TID PRN, El Estrada MD, 5 mg at 05/21/24 2358    DAPTOmycin (Cubicin) 700 mg/100 mL  mg, 700 mg, intravenous, q48h, Sheldon Saleh MD, Stopped at 05/22/24 1823    dextrose 50 % injection 12.5 g, 12.5 g, intravenous, q15 min PRN, Veronica De La Paz MD, 12.5 g at 05/23/24 0822    dextrose 50 % injection 25 g, 25 g, intravenous, q15 min PRN, Mike Santana MD, 25 g at 05/22/24 0955    epoetin tyson-epbx (Retacrit) injection 8,000 Units, 100 Units/kg, subcutaneous, Every Mon/Wed/Fri, Eber Bruce DO, 8,000 Units at 05/22/24 1756    fentaNYL PF (Sublimaze) injection, , , PRN, Ronaldo Dempsey MD, 25 mcg at 05/13/24 1513    glucagon (Glucagen) injection 1 mg, 1 mg, intramuscular, q15 min PRN, Veronica De La Paz MD    heparin 1,000 unit/mL injection 3,100 Units, 3,100 Units, intra-catheter, After Dialysis, OSBALDO Wilson, DNP, 3,100 Units at 05/20/24 1223    heparin 1,000 unit/mL injection 3,100 Units, 3,100 Units, intra-catheter, After Dialysis, OSBALDO Wilson, DNP, 3,100 Units at 05/20/24 1223    heparin 1,000 unit/mL injection, , , PRN, Ronaldo Dempsey MD, 1,800 Units at 05/13/24 2329    insulin lispro (HumaLOG) injection 0-5 Units, 0-5 Units, subcutaneous, TID, Veronica De La Paz MD, 1 Units at 05/16/24 0956    levothyroxine (Synthroid, Levoxyl) tablet 125 mcg, 125 mcg, oral, Daily before breakfast, Andi Bazan, APRN-CNP, DNP, 125 mcg at 05/23/24 0605    lidocaine (Xylocaine) 20 mg/mL (2 %) injection, , , PRN, Ronaldo Dempsey MD, 5 mL at 05/13/24 1513    lidocaine 4 % patch 1 patch, 1 patch, transdermal, Daily, Michelle CALLOWAY  KAUR Drummond, 1 patch at 05/23/24 0839    lidocaine PF (Xylocaine) 10 mg/mL (1 %) injection, , , PRN, Ronaldo Dempsey MD, 5 mL at 05/03/24 1816    loperamide (Imodium) capsule 2 mg, 2 mg, oral, q4h PRN, OSBALDO Wilson, DNP    midazolam (Versed) injection, , , PRN, Ronaldo Dempsey MD, 0.5 mg at 05/13/24 1513    nitroglycerin (Nitrostat) SL tablet 0.4 mg, 0.4 mg, sublingual, q5 min PRN, OSBALDO Wilson, DNP    ondansetron (Zofran) injection 4 mg, 4 mg, intravenous, q6h PRN, OSBALDO Wilson, DNP    oxyCODONE (Roxicodone) immediate release tablet 5 mg, 5 mg, oral, q4h PRN, OSBALDO Wilson, DNP, 5 mg at 05/23/24 0855    oxygen (O2) therapy, , , Continuous PRN, Ronaldo Dempsey MD, Last Rate: 120,000 mL/hr at 05/03/24 1758, 2 L/min at 05/03/24 1758    oxygen (O2) therapy, , inhalation, Continuous PRN - O2/gases, OSBALDO Rousseau    oxygen (O2) therapy, , inhalation, Continuous PRN - O2/gases, OSBALDO Rousseau    pantoprazole (ProtoNix) injection 40 mg, 40 mg, intravenous, BID, OSBALDO Marin, 40 mg at 05/24/24 0937    polyethylene glycol (Glycolax, Miralax) packet 17 g, 17 g, oral, Daily, OSBALDO Wilson, DNP, 17 g at 05/22/24 1014    sennosides (Senokot) tablet 8.6 mg, 8.6 mg, oral, BID, OSBALDO Wilson, DNP, 8.6 mg at 05/22/24 1029    sevelamer carbonate (Renvela) tablet 2,400 mg, 2,400 mg, oral, TID, OSBALDO Wilson DNP, 2,400 mg at 05/23/24 1751    simethicone (Mylicon) chewable tablet 80 mg, 80 mg, oral, q8h PRN, OSBALDO Wilson, LUCILA    [Held by provider] sodium zirconium cyclosilicate (Lokelma) packet 10 g, 10 g, oral, Daily, Eber Bruce DO, 10 g at 05/23/24 0839   Medications Discontinued During This Encounter   Medication Reason    loperamide (Imodium A-D) 2 mg tablet Med List Cleanup    piperacillin-tazobactam-dextrose (Zosyn) IV 2.25 g      "piperacillin-tazobactam-dextrose (Zosyn) IV 2.25 g     heparin 1,000 unit/mL injection 3,100 Units     heparin 1,000 unit/mL injection 3,100 Units     glucagon (Glucagen) injection 1 mg     dextrose 50 % injection 25 g     glucagon (Glucagen) injection 1 mg     dextrose 50 % injection 12.5 g     insulin lispro (HumaLOG) injection 0-5 Units     melatonin tablet 3 mg     insulin regular 100 unit/100 mL (1 unit/mL) in 0.9 % NaCl infusion     insulin regular (HumuLIN, NovoLIN) bolus from bag 2-6 Units     dextrose 5 % and lactated Ringer's infusion     vancomycin (Vancocin) pharmacy to dose - pharmacy monitoring Duplicate order    epoetin tyson-epbx (Retacrit) injection 8,000 Units Availability    insulin glargine (Lantus) injection 10 Units     epoetin tyson-epbx (Retacrit) injection 8,000 Units     norepinephrine (Levophed) 8 mg in dextrose 5% 250 mL (0.032 mg/mL) infusion (premix)     insulin glargine (Lantus) injection 5 Units     oxygen (O2) therapy     insulin glargine (Lantus) injection 4 Units     atorvastatin (Lipitor) tablet 40 mg     DAPTOmycin (Cubicin) 450 mg in sodium chloride 0.9% 50 mL IV     vancomycin (Vancocin) pharmacy to dose - pharmacy monitoring     sodium chloride 0.9% infusion     insulin glargine (Lantus) injection 2 Units         Allergies  Allergies   Allergen Reactions    Cashew Nut Anaphylaxis and Swelling     cashews        Last Recorded Vitals  Blood pressure 132/76, pulse 86, temperature 36.6 °C (97.9 °F), temperature source Temporal, resp. rate 18, height 1.753 m (5' 9.02\"), weight 68.5 kg (151 lb), SpO2 94%.  Intake/Output last 3 Shifts:  No intake/output data recorded.    Last 24 hour Results  Results for orders placed or performed during the hospital encounter of 04/30/24 (from the past 24 hour(s))   POCT GLUCOSE   Result Value Ref Range    POCT Glucose 114 (H) 74 - 99 mg/dL   POCT GLUCOSE   Result Value Ref Range    POCT Glucose 136 (H) 74 - 99 mg/dL   POCT GLUCOSE   Result Value Ref " Range    POCT Glucose 195 (H) 74 - 99 mg/dL   Renal Function Panel   Result Value Ref Range    Glucose 226 (H) 74 - 99 mg/dL    Sodium 131 (L) 136 - 145 mmol/L    Potassium 4.2 3.5 - 5.3 mmol/L    Chloride 95 (L) 98 - 107 mmol/L    Bicarbonate 23 21 - 32 mmol/L    Anion Gap 17 10 - 20 mmol/L    Urea Nitrogen 22 6 - 23 mg/dL    Creatinine 7.14 (H) 0.50 - 1.30 mg/dL    eGFR 9 (L) >60 mL/min/1.73m*2    Calcium 8.1 (L) 8.6 - 10.3 mg/dL    Phosphorus 5.6 (H) 2.5 - 4.9 mg/dL    Albumin 2.3 (L) 3.4 - 5.0 g/dL   POCT GLUCOSE   Result Value Ref Range    POCT Glucose 221 (H) 74 - 99 mg/dL        Imaging results  Electrocardiogram, 12-lead PRN ACS symptoms    Result Date: 5/17/2024  Atrial fibrillation with rapid ventricular response with premature ventricular or aberrantly conducted complexes Nonspecific ST and T wave abnormality , probably digitalis effect Abnormal ECG When compared with ECG of 16-JAN-2024 07:06, Atrial fibrillation has replaced Sinus rhythm ST elevation now present in Inferior leads Nonspecific T wave abnormality, worse in Lateral leads    IR CVC removal    Result Date: 5/16/2024  These images are not reportable by radiology and will not be interpreted by  Radiologists.    Transesophageal Echo (POLI)    Result Date: 5/14/2024   [facility], [address] [place] [address]              Tel [phone] and Fax [phone] TRANSESOPHAGEAL ECHOCARDIOGRAM REPORT  Patient Name:      XIOMARA Maya Physician:    [id number] Edward Hernandez DO Study Date:        5/14/2024           Ordering Provider:    [id number] ALKA VIEYRA MRN/PID:           [id number]            Fellow: Accession#:        [id number]        Nurse:                Ronaldo Woo RN Date of Birth/Age: 1985 / 39      Sonographer:          Alejandro Carroll RDCS                    years Gender:             CASSANDRA                   Additional Staff:     Kayden Matthews CRNA Height:            175.26 cm           Admit Date:           4/30/2024 Weight:            72.58 kg            Admission Status:     Inpatient -                                                              Routine BSA / BMI:         1.88 m2 / 23.63     Encounter#:           [id number]                    kg/m2                                        Department Location:  [facility] Non                                                              Invasive Blood Pressure: 116 /69 mmHg Study Type:    TRANSESOPHAGEAL ECHO (POLI) Diagnosis/ICD: Bacteremia-R78.81 Indication:    Staphyococcus aureus bacteremia, R/o endocarditis CPT Code:      POLI Complete-37424; Doppler Limited-22347; Color Doppler-18342 Patient History: Allergies:         Cashew nuts. Smoker:            Unknown. Diabetes:          Yes Insulin Pertinent History: HTN, Hyperlipidemia and PVD. 39 y.o. male presents for POLI                    for endocarditis rule out.                     PMH of ESRD and SIRS. Study Detail: The following Echo studies were performed: 2D. Agitated saline               used as a contrast agent for intraseptal flow evaluation. Total               contrast used for this procedure was 10 mL via IV push. Patient's               heart rhythm is sinus tachycardia. The patient was sedated.  PHYSICIAN INTERPRETATION: POLI Details: The POLI probe used was 5177. Technically adequate omniplane transesophageal echocardiogram performed. POLI Medication: The pharynx was anesthetized with Cetacaine spray and viscous lidocaine. The patient was sedated by Anesthesia; please refer to anesthesia flow sheet for medications used. POLI Procedure: The probe was passed without difficulty. Left Ventricle: The left ventricular systolic function is mildly decreased, with an estimated ejection fraction of 35-40%. Wall motion is abnormal. The left ventricular cavity size was not assessed. Left  ventricular diastolic filling was not assessed. Left Atrium: The left atrium is normal in size. There is no definite left atrial thrombus present. A bubble study using agitated saline was performed. Bubble study is negative. There is a normal sized left atrial appendage. Right Ventricle: The right ventricle is normal in size. There is normal right ventricular global systolic function. Right Atrium: The right atrium is normal in size. Aortic Valve: There is no visualized aortic valve vegetation. The aortic valve appears structurally normal. There is no evidence of aortic valve regurgitation. Mitral Valve: The mitral valve is normal in structure. There was no mitral valve vegetation visualized. There is trace mitral valve regurgitation. Tricuspid Valve: No vegetation is seen on the tricuspid valve. The tricuspid valve is structurally normal. There is trace tricuspid regurgitation. Pulmonic Valve: No pulmonic valve vegetation visualized. The pulmonic valve is structurally normal. There is no indication of pulmonic valve regurgitation. Pericardium: There is no pericardial effusion noted. Aorta: The aortic root is normal.  CONCLUSIONS:  1. Left ventricular systolic function is mildly decreased with a 35-40% estimated ejection fraction.  2. No mitral valve vegetation visualized.  3. No tricuspid valve vegetation.  4. No aortic valve vegetation visualized.  5. No pulmonic valve vegetation visualized.  6. No left atrial thrombus. QUANTITATIVE DATA SUMMARY:  [id number] Edward Hernandez DO Electronically signed on 5/14/2024 at 7:06:59 PM  ** Final **     IR CVC tunneled    Result Date: 5/13/2024  Interpreted By:  Ronaldo Dempsey, STUDY: IR CVC TUNNELED; ;  5/13/2024 3:26 pm   ULTRASOUND FLUOROSCOPICALLY GUIDED RIGHT GROIN TEMPORARY HEMODIALYSIS CATHETER PLACEMENT   INDICATION: Signs/Symptoms:Temporary dialysis line today. 39-year-old man with end-stage renal disease, persistent bacteremia despite removal of groin tunneled  hemodialysis catheter. Requires replacement of hemodialysis access.   COMPARISON: Fluoroscopic images from prior tunneled hemodialysis catheter exchange 05/13/2024   ACCESSION NUMBER(S): [id number]   ORDERING CLINICIAN: BRADEN QUIROGA   TECHNIQUE:   ATTENDING : Ronaldo Dempsey M.D.   TECHNICAL DESCRIPTION/FINDINGS: The procedure, including all risks, benefits and alternatives were explained to the patient in detail. All questions were answered and written informed consent was obtained.   Patient was positioned supine on the fluoroscopy table. A time-out was performed.   The bilateral groins were prepped and draped in usual sterile fashion. Focused ultrasound was performed over the superior aspect of the right common femoral vein demonstrating superior patency with partial compressibility. Ultrasound images were saved. 1% lidocaine was administered subcutaneously for local anesthesia. Under real-time ultrasound guidance, the right common femoral vein was accessed in antegrade direction using micropuncture technique. Ultrasound images were saved. Under fluoroscopic visualization, an 018 guidewire was advanced into the right common iliac vein and micropuncture transitional introducer was utilized for placement of an 035 guidewire into the IVC. After serial dilation, a 13 [place]awk by 20 cm Trialysis catheter was then placed. A completion fluoroscopic image was obtained demonstrating the tip of the temporary hemodialysis catheter projecting over the IV C.   Catheter lumens easily aspirated and flushed. Large-bore dialysis lumens were locked with a concentrated heparinized solution (1000 units/cc). A small bore 3rd lumen was locked with a dilute heparinized solution. The catheter hub was sutured to the skin with 2 0 Prolene stay suture. Sterile dressing was applied.   SEDATION/MEDICATIONS: Continuous cardiopulmonary monitoring was performed by a radiology nurse for the duration of the procedure. 0.5 mg Versed  and   25 mcg Fentanyl were administered for moderate conscious sedation time of 20 minutes.      10 cc 1% Lidocaine was administered subcutaneously for local anesthesia. SPECIMENS: None. ESTIMATED BLOOD LOSS:  5 cc FLOUROSCOPY:  0.1 minutes; DAP  2405 mGy-cm*2; Air Kerma  6.58 mGy CONTRAST: None.   FINDINGS: Test       Ultrasound fluoroscopically guided right groin temporary hemodialysis catheter. The catheter is ready for immediate use.     MACRO: None   Signed by: Ronaldo Dempsey 5/13/2024 4:59 PM Dictation workstation:   [id number]    MR lumbar spine wo IV contrast    Result Date: 5/4/2024  Interpreted By:  Eva Og and Muddasani Dheeraj STUDY: MR LUMBAR SPINE WO IV CONTRAST   INDICATION: Signs/Symptoms:back pain new with bacteremia, possible infection   COMPARISON: CT of the lumbar spine 04/30/2024. Body CT 01/14/2024.   ACCESSION NUMBER(S): [id number]   ORDERING CLINICIAN: ALKA VIEYRA   TECHNIQUE: Sagittal T1, sagittal T2, sagittal STIR, axial T1 and axial T2 weighted MRI images of the lumbar spine were obtained without intravenous contrast administration.   FINDINGS: Image quality is somewhat degraded due to motion and technique.   There are 5 non-rib-bearing lumbar-type vertebral bodies. The last well-formed intervertebral disc is labeled L5-S1.   Minimal retrolisthesis at L5-S1. Alignment is otherwise normal.   Vertebral body heights are maintained. Disc spaces are relatively maintained.   There is diffusely hypointense bone marrow signal on the T1 weighted images which may be related to renal osteodystrophy. Differential considerations include anemia, reactive marrow or bone marrow infiltrating process.   There is no increased STIR signal to suggest marrow edema. Multilevel degenerative endplate changes include prominent Schmorl's nodes most pronounced at L3-4 where there are Modic type 2 changes and central intradiscal T1 and T2 hypointense signal likely degenerative.   The conus medullaris  terminates at L1-2. The visualized spinal cord, conus medullaris and cauda equina demonstrate normal signal and morphology.   Fatty atrophic changes involve the paravertebral musculature. Partially visualized kidneys demonstrate atrophic changes with multiple bilateral cysts, better characterized on previous body CT dated 01/14/2024. Partially visualized catheter within the inferior vena cava and right common iliac vein.   T10-11 on sagittal images only: No spinal canal or neural foraminal stenosis.   T11-12 on sagittal images only: No spinal canal or neural foraminal stenosis.   T12-L1: Facet arthropathy and ligamentum flavum thickening. No spinal canal or neural foraminal stenosis.   L1-2: Facet arthropathy and ligamentum flavum thickening. No spinal canal or neural foraminal stenosis.   L2-3: Facet arthropathy and ligamentum flavum thickening. No spinal canal or neural foraminal stenosis.   L3-4: Disc bulge, facet arthropathy and ligamentum flavum thickening. No spinal canal or neural foraminal stenosis.   L4-5: Disc bulge with small central protrusion, facet arthropathy and ligamentum flavum thickening. No spinal canal stenosis. Mild bilateral neural foraminal stenosis.   L5-S1: Disc bulge, facet arthropathy and ligamentum flavum thickening. No spinal canal stenosis. Mild right and no left neural foraminal stenosis.   Degenerative changes are noted at the sacroiliac joints.       Diffusely hypointense bone marrow signal on the T1 weighted images may be related to renal osteodystrophy. Differential considerations include anemia, reactive bone marrow or bone marrow infiltrating process.   No definite imaging findings to suggest discitis osteomyelitis on the current exam.   Degenerative changes without significant spinal canal stenosis. Varying degrees of mild neural foraminal narrowing as detailed above.   I personally reviewed the images/study and I agree with the findings as stated by Dr. Lowe.   MACRO:  None   Signed by: Eva Og 5/4/2024 11:11 AM Dictation workstation:   [id number]    IR CVC exchange    Result Date: 5/3/2024  Interpreted By:  Ronaldo Dempsey, STUDY: IR CVC EXCHANGE; ;  5/3/2024 6:28 pm   FLUOROSCOPICALLY GUIDED EXCHANGE OF RIGHT GROIN TUNNELED HEMODIALYSIS CATHETER   INDICATION: Signs/Symptoms:Bacteremia- Right fem TDC exchange. 39-year-old man with end-stage renal disease, central thoracic venous occlusion, end-stage vascular access with bacteremia. Guidewire exchange of right groin tunneled hemodialysis catheter in an attempt to salvage the access.   COMPARISON: Chest radiograph 04/30/2024, fluoroscopic images from prior tunneled hemodialysis catheter placement 01/22/2024   ACCESSION NUMBER(S): [id number]   ORDERING CLINICIAN: SACHI MORTENSEN   TECHNIQUE:   ATTENDING : Ronaldo Dempsey M.D.   TECHNICAL DESCRIPTION/FINDINGS: The procedure, including all risks, benefits and alternatives were explained to the patient in detail. All questions were answered and written informed consent was obtained.   The patient was positioned supine on the angiography table. A time-out was performed.   The right groin was prepped and draped in usual sterile fashion. A  fluoroscopic image was obtained demonstrating the tunneled hemodialysis catheter terminating at the inferior cavoatrial junction.   1% lidocaine was administered via the subcutaneous tunnel tract for local anesthesia. Blunt dissection was performed to free the subcutaneous catheter cuff. An 035 stiff Glidewire was then advanced through the catheter into the right atrium. The old catheter was removed. Over the guidewire, a new 14.5 Mongolian by 42 cm tip to cuff dual-lumen hemodialysis catheter was then placed. A completion fluoroscopic image demonstrates the catheter in the inferior right atrium.   Catheter lumens easily aspirated and flushed and were locked with a concentrated heparinized solution (1000 units/cc). The  catheter hub was then sutured to the skin with 2 0 Prolene stay suture. Because of losing along the tract, Gel-Foam pledgets were then administered subcutaneously near where the catheter enters the skin for additional hemostasis, followed by a pursestring suture. A sterile dressing was applied.   SEDATION/MEDICATIONS: Continuous cardiopulmonary monitoring was performed by a radiology nurse for the duration of the procedure.  1 mg Versed and   50 mcg Fentanyl were administered for moderate conscious sedation time of 20 minutes.      10 cc 1% Lidocaine was administered subcutaneously for local anesthesia. SPECIMENS: None. ESTIMATED BLOOD LOSS:  5 cc FLOUROSCOPY:  1.2 minutes; DAP  52399 mGy-cm*2; Air Kerma  59.07 mGy CONTRAST: None.   FINDINGS: Test       Fluoroscopically guidewire exchange of right groin tunneled hemodialysis catheter. The newly exchanged catheter is ready for immediate use.     MACRO: None   Signed by: Ronaldo Dempsey 5/3/2024 8:09 PM Dictation workstation:   [id number]    Transthoracic Echo Complete    Result Date: 5/3/2024   [facility], [address] [place] [address]              Tel [phone] and Fax [phone] TRANSTHORACIC ECHOCARDIOGRAM REPORT  Patient Name:      XIOMARA TRUJILLO PHYLLIS Maya Physician:    [id number] Brandon Nur DO Study Date:        5/3/2024             Ordering Provider:    [id number] VIEYRA MRN/PID:           [id number]             Fellow: Accession#:        [id number]         Nurse: Date of Birth/Age: 1985 / 39 years Sonographer:          Sy Lyons [facility] Gender:            M                    Additional Staff: Height:            175.00 cm            Admit Date: Weight:            65.80 kg             Admission Status:     Inpatient -                                                               Routine BSA / BMI:         1.80 m2 / 21.49      Encounter#:           [id number]                     kg/m2                                         Department Location:  [facility] Non                                                               Invasive Blood Pressure: 112 /74 mmHg Study Type:    TRANSTHORACIC ECHO (TTE) COMPLETE Diagnosis/ICD: Endocarditis, valve unspecified-I38 Indication:    endocarditis CPT Code:      Echo Complete w Full Doppler-48203 Patient History: Pertinent History: HTN and Hyperlipidemia. Study Detail: The following Echo studies were performed: M-Mode, Doppler, color               flow and 2D.  PHYSICIAN INTERPRETATION: Left Ventricle: The left ventricular systolic function is moderately decreased, with an estimated ejection fraction of 35-40%. There are no regional wall motion abnormalities. The left ventricular cavity size is normal. Abnormal (paradoxical) septal motion, consistent with left bundle branch block. Spectral Doppler shows an impaired relaxation pattern of left ventricular diastolic filling. Left Atrium: The left atrium is normal in size. Right Ventricle: The right ventricle is normal in size. There is normal right ventricular global systolic function. Right Atrium: The right atrium is normal in size. Aortic Valve: The aortic valve is probably trileaflet. There is no evidence of aortic valve regurgitation. Mitral Valve: The mitral valve is mildly thickened. There is trace mitral valve regurgitation. Tricuspid Valve: The tricuspid valve is structurally normal. No evidence of tricuspid regurgitation. Pulmonic Valve: The pulmonic valve is structurally normal. There is no indication of pulmonic valve regurgitation. Pericardium: There is no pericardial effusion noted. Aorta: The aortic root is normal. In comparison to the previous echocardiogram(s): Compared with study from 1/16/2024, LV function has declined. Was previously normal.  CONCLUSIONS:  1. Left ventricular systolic function is moderately decreased with a 35-40% estimated ejection fraction.  2. Abnormal  septal motion consistent with left bundle branch block.  3. Spectral Doppler shows an impaired relaxation pattern of left ventricular diastolic filling.  4. No vegetations visualized within the limitations of this study.  5. Compared with study from 1/16/2024, LV function has declined. Was previously normal. QUANTITATIVE DATA SUMMARY: LA VOLUME:                               Normal Ranges: LA Vol A4C:        40.1 ml    (22+/-6mL/m2) LA Vol A2C:        35.3 ml LA Vol BP:         40.9 ml LA Vol Index A4C:  22.3ml/m2 LA Vol Index A2C:  19.6 ml/m2 LA Vol Index BP:   22.7 ml/m2 LA Area A4C:       15.3 cm2 LA Area A2C:       13.2 cm2 LA Major Axis A4C: 5.0 cm LA Major Axis A2C: 4.2 cm LA Volume Index:   22.7 ml/m2  [id number] Brandon Nur DO Electronically signed on 5/3/2024 at 4:24:04 PM  ** Final **     Lower extremity venous duplex right    Result Date: 5/1/2024             [place] [address]   Tel [phone] and Fax [phone]  Vascular Lab Report [facility] US LOWER EXTREMITY VENOUS DUPLEX RIGHT  Patient Name:      XIOMARA Maya Physician:  [id number] Rayo Jauregui MD, RPVI Study Date:        4/30/2024            Ordering Physician: [id number] PANIAGUA MRN/PID:           [id number]             Technologist:       Megan Shay RVT Accession#:        [id number]         Technologist 2: Date of Birth/Age: 1985 / 39 years Encounter#:         [id number] Gender:            M Admission Status:  Emergency            Location Performed: [facility]  Diagnosis/ICD: Pain in right leg-M79.604 CPT Codes:     59514 Peripheral venous duplex scan for DVT Limited  **CRITICAL RESULT** Critical Result: Acute DVT in the right leg. Notification called to Deep Paniagua PA-C on 4/30/2024 at 10:52:21 AM by Megan Shay RDMS, RVT.  CONCLUSIONS: Right Lower Venous: There is acute non-occlusive deep vein thrombosis visualized  in the common femoral vein. There are chronic changes visualized in the popliteal vein. Enlarged lymph node noted in the right groin. Waveforms suggestive of more distal obstruction/thrombus. May wish further means of imaging evaluation. Left Lower Venous: There are chronic changes visualized in the common femoral vein.  Comparison: Compared with study from 7/27/2023, New finding of acute non-occlusive thrombus in the right CFV.  Imaging & Doppler Findings:  Right                 Compressible      Thrombus          Flow Distal External Iliac                     None CFV                     Partial    Acute non-occlusive Continuous PFV                       Yes             None FV Proximal               Yes             None         Continuous FV Mid                    Yes             None FV Distal                 Yes             None Popliteal                 Yes            Chronic       Continuous Peroneal                  Yes             None PTV                       Yes             None  Left Compress Thrombus        Flow CFV    Yes    Chronic  Spontaneous/Phasic  [id number] MARIANO Call MD Electronically signed by [id number] MARIANO Call MD on 5/1/2024 at 9:13:07 AM  ** Final **     XR chest 1 view    Result Date: 4/30/2024  STUDY: Chest Radiograph;  4/30/2024 at 2:42 PM. INDICATION: Fever. COMPARISON: XR chest 1/14/2024, 11/10/2023; CTA chest 2/15/2023. ACCESSION NUMBER(S): [id number] ORDERING CLINICIAN: WENDY INFANTE TECHNIQUE:  Frontal chest was obtained at 14:42 hours. FINDINGS: CARDIOMEDIASTINAL SILHOUETTE: Cardiomediastinal silhouette is normal in size and configuration.  LUNGS: Patchy right basilar infiltrate or atelectasis with small right pleural effusion. Left basilar scar versus atelectasis. ABDOMEN: No remarkable upper abdominal findings.  BONES: No acute osseous changes.    Patchy right basilar infiltrate or atelectasis with small right pleural effusion. Signed by Joseph Overton MD    CT  thoracic spine wo IV contrast    Result Date: 4/30/2024  STUDY: CT Thoracic Spine and Lumbar Spine without IV Contrast; 4/30/2024 11:11 AM INDICATION: Midline back pain. COMPARISON: None Available. ACCESSION NUMBER(S): [id number], [id number] ORDERING CLINICIAN: RAJNI FARMER TECHNIQUE:  CT of the thoracic spine and lumbar spine was performed without intravenous or intrathecal contrast.  Sagittal and coronal reconstructions were generated.  Automated mA/kV exposure control was utilized and patient examination was performed in strict accordance with principles of ALARA. FINDINGS: Portions of the T1 vertebral body, as well as a small portion of the anterior superior aspect of T2 is cut off on this study.  No compression deformities are visualized within the thoracic vertebral bodies from T2 through T12.  No spondylolisthesis is noted.  No pedicular defects are noted.  No prominent edema is appreciated within the adjacent posterior paravertebral musculature.  Evaluation of the thecal sac is limited due to lack of myelographic contrast.  There are enlarged lymph nodes seen within the visualized portions of the mediastinum.  There are bilateral lower lobe infiltrates with tiny bilateral effusions.  Coronary artery calcifications are present. No compression deformities are visualized within the lumbar spine.  No pars defects are noted.  No significant spondylolisthesis is seen.  No prominent edema is appreciated within the visualized portions of the psoas musculature.  A long dialysis catheter is visualized within the inferior vena cava.  Evaluation of the thecal sac is limited due to lack of myelographic contrast.  There is disc bulging seen from L3 through S1.  No significant impingement is appreciated upon the neural foramina on the sagittal reconstructions.    Thoracic spine: 1.  Portions of T1 and T2 cough on this study. 2.  No acute osseous findings seen from T3 through T12. 3.  Tiny bilateral effusions with  adjacent lower lobe infiltrates. 4.  The distal adenopathy. 5.  Coronary artery calcifications. Lumbar spine: 1.  No compression deformities or spinal listhesis is noted. 2.  Disc bulging from L3 to S1.  Evaluation for spinal stenosis is limited due to lack mammographic contrast. 3.  Incidental note of a long dialysis catheter within the inferior vena cava. Signed by Deejay Hoffman MD    CT lumbar spine wo IV contrast    Result Date: 4/30/2024  STUDY: CT Thoracic Spine and Lumbar Spine without IV Contrast; 4/30/2024 11:11 AM INDICATION: Midline back pain. COMPARISON: None Available. ACCESSION NUMBER(S): [id number], [id number] ORDERING CLINICIAN: RAJNI FARMER TECHNIQUE:  CT of the thoracic spine and lumbar spine was performed without intravenous or intrathecal contrast.  Sagittal and coronal reconstructions were generated.  Automated mA/kV exposure control was utilized and patient examination was performed in strict accordance with principles of ALARA. FINDINGS: Portions of the T1 vertebral body, as well as a small portion of the anterior superior aspect of T2 is cut off on this study.  No compression deformities are visualized within the thoracic vertebral bodies from T2 through T12.  No spondylolisthesis is noted.  No pedicular defects are noted.  No prominent edema is appreciated within the adjacent posterior paravertebral musculature.  Evaluation of the thecal sac is limited due to lack of myelographic contrast.  There are enlarged lymph nodes seen within the visualized portions of the mediastinum.  There are bilateral lower lobe infiltrates with tiny bilateral effusions.  Coronary artery calcifications are present. No compression deformities are visualized within the lumbar spine.  No pars defects are noted.  No significant spondylolisthesis is seen.  No prominent edema is appreciated within the visualized portions of the psoas musculature.  A long dialysis catheter is visualized within the inferior vena  cava.  Evaluation of the thecal sac is limited due to lack of myelographic contrast.  There is disc bulging seen from L3 through S1.  No significant impingement is appreciated upon the neural foramina on the sagittal reconstructions.    Thoracic spine: 1.  Portions of T1 and T2 cough on this study. 2.  No acute osseous findings seen from T3 through T12. 3.  Tiny bilateral effusions with adjacent lower lobe infiltrates. 4.  The distal adenopathy. 5.  Coronary artery calcifications. Lumbar spine: 1.  No compression deformities or spinal listhesis is noted. 2.  Disc bulging from L3 to S1.  Evaluation for spinal stenosis is limited due to lack mammographic contrast. 3.  Incidental note of a long dialysis catheter within the inferior vena cava. Signed by Deejay Hoffman MD    XR pelvis 1-2 views    Result Date: 4/30/2024  STUDY: Pelvis Radiographs; 4/30/2024 at 9:06 AM. INDICATION: Pain. COMPARISON: CT pelvis 7/17/2023. ACCESSION NUMBER(S): [id number] ORDERING CLINICIAN: RAJNI FARMER TECHNIQUE:  One view(s) of the pelvis. FINDINGS:  The pelvic ring is intact.  There is no acute fracture.  Severe underlying osteopenia.    Slightly limited secondary to underlying osteopenia.  No acute osseous abnormality. Signed by Roe Ruiz MD       Assessment and Plan    Edwar Hemphill is a 38 y.o. male with a past medical history of end-stage renal disease on hemodialysis via right femoral TDC who resides at [facility]. 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  He later had stump debridement on 7/27 with wound VAC placement.  He had polymicrobial infection including resistant E coli,  Acinetobacter baumannii and Enterobacter fecalis. He was admitted last September with positive blood cultures growing Acinetobacter baumannii and Stenotrophomonas. He went to IR for dialysis line exchange.  He then was admitted again in November where CT noted left sided fluid collection concerning for abscess.  He grew MRSA and Streptococcus, catheter was exchanged on November 13, he completed vancomycin. He then had Staph aureus CLABSI again in January of this.  POLI negative for endocarditis. HD cath removed 1/16 and replaced on 1/19 to the L groin. IV Vancomycin 750mg with HD through 2/14/24.      He comes in now with fever with low back pain radiating down the right leg. Blood cultures were obtained and came back growing MRSA. Infectious disease is following and managing antimicrobial coverage. He remains bacteremic with MRSA despite guidewire exchange of the groin tunneled HD catheter on 5/3. MRI of the lumbar spine did not show osteomyelitis/discitis and 2 D ECHO was negative for a vegetation. We dialyzed him on Wednesday. We removed his line all together for a line holiday on 5/9.     We dialyzed him Monday via a right femoral temporary dialysis line then removed it. Repeat cultures from 5/21 are without growth. Antimicrobial coverage with daptomycin and Teflaro noted. ID is following. He is planned for PermCath placement today. He will have dialysis after. We will await the finalized  recommendations regarding his antibiotics from infectious disease. Will follow.       I spent a total of 40 minutes with this patient today.    Eber Bruce,

## 2024-05-24 NOTE — NURSING NOTE
Transport is here to take patient down for HD catheter placement. Patient is upset that he did not get washed up prior to going down for HD Catheter placement. Patient was informed that he will get washed up when he comes back to the floor.

## 2024-05-24 NOTE — CARE PLAN
The patient's goals for the shift include  pt will remain HDS throughout the shift    The clinical goals for the shift include Pt will maintain safety throughout this shift    Over the shift, the patient did make progress toward the following goals. Barriers to progression include . Recommendations to address these barriers include .

## 2024-05-24 NOTE — POST-PROCEDURE NOTE
Interventional Radiology Brief Postprocedure Note    Attending: Ke    Assistant: None    Diagnosis: ESRD    Description of procedure: R CFV TDC, ready for use.     Anesthesia:  Local  1 mg Versed, 50 mcg Fentanyl    Complications: None    Estimated Blood Loss:  5 cc's        No specimens collected      See detailed result report with images in PACS.    The patient tolerated the procedure well without incident or complication and is in stable condition.

## 2024-05-24 NOTE — NURSING NOTE
Attempted to speak with patient regarding pt concerns. Pt off of unit at this time. Spoke with nurse who states she will assist patient in getting washed up after his procedure because she was unable to assist prior to his transport downstairs.

## 2024-05-24 NOTE — PROGRESS NOTES
"Edwar Hemphill is a 39 y.o. male on day 24 of admission presenting with SIRS (systemic inflammatory response syndrome) (Multi).    Subjective   No new complaints  Hypoglycemia yesterday morning    Glargine discontinued by primary team 2 days ago.       Scheduled medications  [Held by provider] apixaban, 5 mg, oral, BID  B complex-vitamin C-folic acid, 1 capsule, oral, Daily  ceftaroline, 200 mg, intravenous, q8h  DAPTOmycin, 700 mg, intravenous, q48h  epoetin tyson or biosimilar, 100 Units/kg, subcutaneous, Every Mon/Wed/Fri  heparin, 3,100 Units, intra-catheter, After Dialysis  heparin, 3,100 Units, intra-catheter, After Dialysis  insulin lispro, 0-5 Units, subcutaneous, TID  levothyroxine, 125 mcg, oral, Daily before breakfast  lidocaine, 1 patch, transdermal, Daily  pantoprazole, 40 mg, intravenous, BID  polyethylene glycol, 17 g, oral, Daily  sennosides, 8.6 mg, oral, BID  sevelamer carbonate, 2,400 mg, oral, TID  [Held by provider] sodium zirconium cyclosilicate, 10 g, oral, Daily      Continuous medications  oxygen, , Last Rate: 2 L/min (05/03/24 1758)      Objective     Physical Exam  Constitutional:       General: He is not in acute distress.  Neurological:      Mental Status: He is alert.         Last Recorded Vitals  Blood pressure 122/80, pulse 85, temperature 36.7 °C (98 °F), temperature source Oral, resp. rate 18, height 1.753 m (5' 9.02\"), weight 68.5 kg (151 lb), SpO2 95%.  Intake/Output last 3 Shifts:  No intake/output data recorded.    Relevant Results  Results from last 7 days   Lab Units 05/23/24  2054 05/23/24  1522 05/23/24  1147 05/23/24  0909 05/23/24  0813 05/23/24  0611 05/21/24  1208 05/21/24  1129 05/20/24  1146 05/20/24  1126 05/19/24  0754 05/19/24  0619 05/17/24  2134 05/17/24  1521   POCT GLUCOSE mg/dL 195* 136* 114* 79 47*  --    < >  --    < >  --    < >  --    < >  --    GLUCOSE mg/dL  --   --   --   --   --  48*  --  102*  --  68*  --  124*  --  108*    < > = values in this interval " not displayed.       Assessment/Plan   Principal Problem:    SIRS (systemic inflammatory response syndrome) (Multi)    IMPRESSION  DIABETIC KETOACIDOSIS, RESOLVED  TYPE 2 DIABETES MELLITUS  LONG TERM CURRENT INSULIN USE  Hypoglycemia on minimal glargine, currently held     RECOMMENDATIONS  He will eventually need to resume insulin glargine.  Follow glucose.   Insulin lispro scale QAC       Mike Santana MD

## 2024-05-24 NOTE — POST-PROCEDURE NOTE
..Report to Receiving RN:    Report To: ORLANDO Cline  Time Report Called: [id number]  Hand-Off Communication: verbal  Complications During Treatment: No  Ultrafiltration Treatment: No  Medications Administered During Dialysis: No  Blood Products Administered During Dialysis: No  Labs Sent During Dialysis: No  Heparin Drip Rate Changes: N/A  Dialysis Catheter Dressing: yes  Last Dressing Change: 05/24/24    Electronic Signatures:   (Signed )   Authored:    (Signed )   Authored:     Last Updated: 6:39 PM by FESTUS SOLIS

## 2024-05-25 ENCOUNTER — APPOINTMENT (OUTPATIENT)
Dept: DIALYSIS | Facility: HOSPITAL | Age: 39
End: 2024-05-25
Payer: COMMERCIAL

## 2024-05-25 LAB
ALBUMIN SERPL BCP-MCNC: 2.2 G/DL (ref 3.4–5)
ANION GAP SERPL CALC-SCNC: 24 MMOL/L (ref 10–20)
BACTERIA BLD CULT: NORMAL
BACTERIA BLD CULT: NORMAL
BUN SERPL-MCNC: 17 MG/DL (ref 6–23)
CALCIUM SERPL-MCNC: 8.7 MG/DL (ref 8.6–10.3)
CHLORIDE SERPL-SCNC: 96 MMOL/L (ref 98–107)
CO2 SERPL-SCNC: 17 MMOL/L (ref 21–32)
CREAT SERPL-MCNC: 5.17 MG/DL (ref 0.5–1.3)
EGFRCR SERPLBLD CKD-EPI 2021: 14 ML/MIN/1.73M*2
GLUCOSE BLD MANUAL STRIP-MCNC: 100 MG/DL (ref 74–99)
GLUCOSE BLD MANUAL STRIP-MCNC: 134 MG/DL (ref 74–99)
GLUCOSE BLD MANUAL STRIP-MCNC: 198 MG/DL (ref 74–99)
GLUCOSE BLD MANUAL STRIP-MCNC: 218 MG/DL (ref 74–99)
GLUCOSE SERPL-MCNC: 209 MG/DL (ref 74–99)
PHOSPHATE SERPL-MCNC: 4.7 MG/DL (ref 2.5–4.9)
POTASSIUM SERPL-SCNC: 4.5 MMOL/L (ref 3.5–5.3)
SODIUM SERPL-SCNC: 132 MMOL/L (ref 136–145)

## 2024-05-25 PROCEDURE — 2500000004 HC RX 250 GENERAL PHARMACY W/ HCPCS (ALT 636 FOR OP/ED): Performed by: INTERNAL MEDICINE

## 2024-05-25 PROCEDURE — 1200000002 HC GENERAL ROOM WITH TELEMETRY DAILY

## 2024-05-25 PROCEDURE — 2500000004 HC RX 250 GENERAL PHARMACY W/ HCPCS (ALT 636 FOR OP/ED)

## 2024-05-25 PROCEDURE — 8010000001 HC DIALYSIS - HEMODIALYSIS PER DAY

## 2024-05-25 PROCEDURE — 36415 COLL VENOUS BLD VENIPUNCTURE: CPT | Performed by: INTERNAL MEDICINE

## 2024-05-25 PROCEDURE — 2500000004 HC RX 250 GENERAL PHARMACY W/ HCPCS (ALT 636 FOR OP/ED): Performed by: NURSE PRACTITIONER

## 2024-05-25 PROCEDURE — 2500000002 HC RX 250 W HCPCS SELF ADMINISTERED DRUGS (ALT 637 FOR MEDICARE OP, ALT 636 FOR OP/ED): Performed by: NURSE PRACTITIONER

## 2024-05-25 PROCEDURE — 99232 SBSQ HOSP IP/OBS MODERATE 35: CPT | Performed by: INTERNAL MEDICINE

## 2024-05-25 PROCEDURE — 2500000001 HC RX 250 WO HCPCS SELF ADMINISTERED DRUGS (ALT 637 FOR MEDICARE OP): Performed by: NURSE PRACTITIONER

## 2024-05-25 PROCEDURE — 82947 ASSAY GLUCOSE BLOOD QUANT: CPT | Mod: 91

## 2024-05-25 PROCEDURE — 2500000002 HC RX 250 W HCPCS SELF ADMINISTERED DRUGS (ALT 637 FOR MEDICARE OP, ALT 636 FOR OP/ED): Performed by: SURGERY

## 2024-05-25 PROCEDURE — 2500000005 HC RX 250 GENERAL PHARMACY W/O HCPCS: Performed by: PHYSICIAN ASSISTANT

## 2024-05-25 PROCEDURE — C9113 INJ PANTOPRAZOLE SODIUM, VIA: HCPCS

## 2024-05-25 PROCEDURE — 80069 RENAL FUNCTION PANEL: CPT | Performed by: INTERNAL MEDICINE

## 2024-05-25 PROCEDURE — 2500000001 HC RX 250 WO HCPCS SELF ADMINISTERED DRUGS (ALT 637 FOR MEDICARE OP): Performed by: INTERNAL MEDICINE

## 2024-05-25 PROCEDURE — 2500000002 HC RX 250 W HCPCS SELF ADMINISTERED DRUGS (ALT 637 FOR MEDICARE OP, ALT 636 FOR OP/ED): Performed by: INTERNAL MEDICINE

## 2024-05-25 RX ORDER — INSULIN GLARGINE 100 [IU]/ML
2 INJECTION, SOLUTION SUBCUTANEOUS NIGHTLY
Start: 2024-05-25

## 2024-05-25 RX ORDER — LINEZOLID 600 MG/1
600 TABLET, FILM COATED ORAL 2 TIMES DAILY
Start: 2024-05-25 | End: 2024-06-08

## 2024-05-25 RX ORDER — MIDODRINE HYDROCHLORIDE 5 MG/1
5 TABLET ORAL
Qty: 10 TABLET | Refills: 1 | Status: SHIPPED | OUTPATIENT
Start: 2024-05-25 | End: 2024-07-07

## 2024-05-25 RX ORDER — MIDODRINE HYDROCHLORIDE 5 MG/1
5 TABLET ORAL ONCE
Status: COMPLETED | OUTPATIENT
Start: 2024-05-25 | End: 2024-05-25

## 2024-05-25 RX ORDER — DAPTOMYCIN IN SODIUM CHLORIDE 700 MG/100ML
700 INJECTION, SOLUTION INTRAVENOUS
Qty: 1500 ML | Refills: 11 | Status: SHIPPED | OUTPATIENT
Start: 2024-05-26 | End: 2024-06-01 | Stop reason: HOSPADM

## 2024-05-25 RX ORDER — CYCLOBENZAPRINE HCL 5 MG
5 TABLET ORAL 3 TIMES DAILY PRN
Qty: 30 TABLET | Refills: 0 | Status: SHIPPED | OUTPATIENT
Start: 2024-05-25 | End: 2024-06-01 | Stop reason: HOSPADM

## 2024-05-25 RX ORDER — INSULIN GLARGINE 100 [IU]/ML
2 INJECTION, SOLUTION SUBCUTANEOUS NIGHTLY
Status: DISCONTINUED | OUTPATIENT
Start: 2024-05-25 | End: 2024-05-26 | Stop reason: HOSPADM

## 2024-05-25 RX ADMIN — INSULIN LISPRO 2 UNITS: 100 INJECTION, SOLUTION INTRAVENOUS; SUBCUTANEOUS at 09:23

## 2024-05-25 RX ADMIN — SENNOSIDES 8.6 MG: 8.6 TABLET, FILM COATED ORAL at 21:00

## 2024-05-25 RX ADMIN — CEFTAROLINE FOSAMIL 200 MG: 600 POWDER, FOR SOLUTION INTRAVENOUS at 13:36

## 2024-05-25 RX ADMIN — CEFTAROLINE FOSAMIL 200 MG: 600 POWDER, FOR SOLUTION INTRAVENOUS at 05:16

## 2024-05-25 RX ADMIN — PANTOPRAZOLE SODIUM 40 MG: 40 INJECTION, POWDER, FOR SOLUTION INTRAVENOUS at 09:30

## 2024-05-25 RX ADMIN — CEFTAROLINE FOSAMIL 200 MG: 600 POWDER, FOR SOLUTION INTRAVENOUS at 22:39

## 2024-05-25 RX ADMIN — INSULIN GLARGINE 2 UNITS: 100 INJECTION, SOLUTION SUBCUTANEOUS at 22:51

## 2024-05-25 RX ADMIN — INSULIN LISPRO 1 UNITS: 100 INJECTION, SOLUTION INTRAVENOUS; SUBCUTANEOUS at 13:36

## 2024-05-25 RX ADMIN — LIDOCAINE 4% 1 PATCH: 40 PATCH TOPICAL at 09:23

## 2024-05-25 RX ADMIN — MIDODRINE HYDROCHLORIDE 5 MG: 5 TABLET ORAL at 01:52

## 2024-05-25 RX ADMIN — NEPHROCAP 1 CAPSULE: 1 CAP ORAL at 09:23

## 2024-05-25 RX ADMIN — MIDODRINE HYDROCHLORIDE 5 MG: 5 TABLET ORAL at 15:10

## 2024-05-25 RX ADMIN — HEPARIN SODIUM 3100 UNITS: 1000 INJECTION INTRAVENOUS; SUBCUTANEOUS at 18:58

## 2024-05-25 RX ADMIN — HEPARIN SODIUM 3100 UNITS: 1000 INJECTION INTRAVENOUS; SUBCUTANEOUS at 18:02

## 2024-05-25 RX ADMIN — SEVELAMER CARBONATE 2400 MG: 800 TABLET, FILM COATED ORAL at 17:33

## 2024-05-25 RX ADMIN — LEVOTHYROXINE SODIUM 125 MCG: 125 TABLET ORAL at 06:30

## 2024-05-25 ASSESSMENT — PAIN SCALES - GENERAL: PAINLEVEL_OUTOF10: 0 - NO PAIN

## 2024-05-25 ASSESSMENT — PAIN DESCRIPTION - LOCATION: LOCATION: BACK

## 2024-05-25 ASSESSMENT — PAIN - FUNCTIONAL ASSESSMENT: PAIN_FUNCTIONAL_ASSESSMENT: NO/DENIES PAIN

## 2024-05-25 NOTE — PROGRESS NOTES
Edwar Hemphill is a 39 y.o. male on day 25 of admission presenting with SIRS (systemic inflammatory response syndrome) (Multi).      Subjective   No issues , doing ok        Objective     Last Recorded Vitals  BP 97/64 (BP Location: Left arm, Patient Position: Lying)   Pulse 85   Temp 36.8 °C (98.3 °F) (Oral)   Resp 18   Wt 68.5 kg (151 lb)   SpO2 99%   Intake/Output last 3 Shifts:    Intake/Output Summary (Last 24 hours) at 5/25/2024 1142  Last data filed at 5/24/2024 1831  Gross per 24 hour   Intake 1000 ml   Output 1405 ml   Net -405 ml       Admission Weight  Weight: 65.8 kg (145 lb) (04/30/24 0825)    Daily Weight  05/23/24 : 68.5 kg (151 lb)    Image Results  IR CVC tunneled  Narrative: Interpreted By:  Ronaldo Dempsey,   STUDY:  IR CVC TUNNELED; ;  5/24/2024 2:56 pm      ULTRASOUND AND FLUOROSCOPICALLY GUIDED RIGHT COMMON FEMORAL VEIN  TUNNELED HEMODIALYSIS CATHETER      INDICATION:  Signs/Symptoms:Permcath placement. 39-year-old man with end-stage  renal disease, chronic superior thoracic venous occlusion, clearance  of bacteremia requires replacement of tunneled hemodialysis catheter.      COMPARISON:  Ultrasound images from temporary catheter placement 05/13/2024,  images from prior tunneled catheter exchange 05/03/2024, fluoroscopic  images from prior tunnel catheter placement 01/22/2024, venography  01/19/2024      ACCESSION NUMBER(S):  [id number]      ORDERING CLINICIAN:  GREGORIA REGALADO      TECHNIQUE:      ATTENDING : Ronaldo Dempsey M.D.      TECHNICAL DESCRIPTION/FINDINGS: The procedure, including all risks,  benefits and alternatives were explained to the patient in detail.  All questions were answered and written informed consent was obtained.      The patient was positioned supine on the fluoroscopy table. A  time-out was performed.      The bilateral groins were prepped and draped in usual sterile  fashion. 1% lidocaine was administered subcutaneously for local  anesthesia.  Using combination of fluoroscopic landmarks and real-time  ultrasound guidance, the right common femoral vein was accessed in  antegrade direction using micropuncture technique. Ultrasound images  were saved. Under fluoroscopic visualization, an 018 guidewire was  advanced into the inferior vena cava and a micropuncture transitional  introducer was placed.      Through the micropuncture transitional introducer, an 035 stiff  Glidewire was used to navigate known caval and inferior cavoatrial  stenosis. The wire was coiled in the right atrium.      Additional 1% lidocaine was then administered subcutaneously for  local anesthesia to anesthetize the subcutaneous tunnel tract. The  dual lumen cuffed hemodialysis catheter was then tunneled from a  right anterolateral proximal right thigh incision site and  externalized overlying the venotomy. After serial dilation, a 15  Nigerian peel-away sheath was placed. Through the sheath over the 035  stiff Glidewire, the 14.5 Nigerian by 42 cm tip to cuff dual lumen  hemodialysis catheter was then placed, and after sheath removal was  further advanced over the Glidewire into the right atrium superior to  the known inferior cavoatrial stenosis. The guidewire was removed. A  completion fluoroscopic image demonstrates the catheter tip  projecting over the inferior aspect of the right atrium.      Catheter lumens easily aspirated and flushed were locked with a  concentrated heparinized solution (1000 units/cc). The catheter hub  was sutured to the skin with 2 0 Prolene stay suture. Sterile  dressing was applied.      SEDATION/MEDICATIONS: Continuous cardiopulmonary monitoring was  performed by a radiology nurse for the duration of the procedure.  1  mg Versed and   50 mcg Fentanyl were administered for moderate  conscious sedation time of 25 minutes.      15 cc 1% Lidocaine was  administered subcutaneously for local anesthesia. SPECIMENS: None.  ESTIMATED BLOOD LOSS:  5 cc  FLOUROSCOPY:   1.6 minutes; DAP  7941 mGy-cm*2; Air Kerma  23.60 mGy  CONTRAST: None.      FINDINGS:  Test      Impression: Ultrasound and fluoroscopically guided right common femoral vein  tunneled hemodialysis catheter. The catheter is ready for immediate  use.          MACRO:  None      Signed by: Ronaldo Dempsey 5/24/2024 10:21 PM  Dictation workstation:   [id number]  IR CVC removal  These images are not reportable by radiology and will not be interpreted   by  Radiologists.      PHYSICAL EXAM  NEUROLOGICAL: No abnormal movements, no changes from before  HEENT: Head atraumatic, perrl,eomi, no oral lesions, no ear rash   NECK: Supple, no ln, jvp flat, thyroid palp  HEART: S1, S2, no added sound  LUNGS: dec a/e  EXTREMITIES: no edema  ABDOMEN: Soft, nontender, bowel sound positive, no organomegaly  SKIN: No change  EYES: No changes, perrl, eomi,   ENT: No change    JOINT: No change  Relevant Results               Assessment/Plan                  Principal Problem:    SIRS (systemic inflammatory response syndrome) (Multi)    Esrd  Anemia of renal origin   T2dm  Pvd    Once arrangements made for antibiotics he can be dc       Malnutrition Diagnosis Status: New  Malnutrition Diagnosis: Severe malnutrition related to chronic disease or condition  As Evidenced by: 18% weight loss over 8 months, intake <75% meals for > 1 month.  I agree with the dietitian's malnutrition diagnosis.     t34    Mauricio Estrada MD

## 2024-05-25 NOTE — PROGRESS NOTES
Edwar Hemphill is a 39 y.o. male on day 25 of admission presenting with SIRS (systemic inflammatory response syndrome) (Multi).      Subjective   Doing well in no acute distress       Objective        Vitals 24HR  Heart Rate:  [85-95]   Temp:  [36.1 °C (97 °F)-37 °C (98.6 °F)]   Resp:  [18]   BP: (88-98)/(56-66)   SpO2:  [97 %-99 %]     Intake/Output last 3 Shifts:    Intake/Output Summary (Last 24 hours) at 5/25/2024 1532  Last data filed at 5/25/2024 1516  Gross per 24 hour   Intake 1200 ml   Output 1400 ml   Net -200 ml       Physical Exam    Appearance: Awake alert no distress  Head and ENT; normocephalic/atraumatic/upper neck/no JVD  Lungs; clear to auscultation  Heart: RRR  Abdomen; soft no tenderness  Extremities right upper extremity amputation  Right femoral tunneled dialysis catheter noted.        Relevant Results     Results from last 7 days   Lab Units 05/21/24  1129 05/20/24  1126 05/19/24  1220   WBC AUTO x10*3/uL 5.0 5.0 5.8   HEMOGLOBIN g/dL 7.2* 7.7* 7.9*   HEMATOCRIT % 23.6* 25.3* 25.7*   PLATELETS AUTO x10*3/uL 151 149* 207      Results from last 7 days   Lab Units 05/25/24  0612 05/24/24  0651 05/23/24  0611   SODIUM mmol/L 132* 131* 134*   POTASSIUM mmol/L 4.5 4.2 4.1   CHLORIDE mmol/L 96* 95* 97*   CO2 mmol/L 17* 23 26   BUN mg/dL 17 22 19   CREATININE mg/dL 5.17* 7.14* 6.27*   GLUCOSE mg/dL 209* 226* 48*   CALCIUM mg/dL 8.7 8.1* 7.9*        Current Facility-Administered Medications:     [Held by provider] apixaban (Eliquis) tablet 5 mg, 5 mg, oral, BID, OSBALDO Wilson, DNP, 5 mg at 05/03/24 1036    B complex-vitamin C-folic acid (Nephrocaps) capsule 1 capsule, 1 capsule, oral, Daily, OSBALDO Wilson, DNP, 1 capsule at 05/25/24 0923    ceftaroline (Teflaro) 200 mg in dextrose 5 % in water (D5W) 50 mL IV, 200 mg, intravenous, q8h, Sheldon Saleh MD, Last Rate: 0 mL/hr at 05/25/24 0616, 200 mg at 05/25/24 1336    cyclobenzaprine (Flexeril) tablet 5 mg, 5 mg, oral, TID PRN,  El Estrada MD, 5 mg at 05/21/24 2358    DAPTOmycin (Cubicin) 700 mg/100 mL  mg, 700 mg, intravenous, q48h, Sheldon Saleh MD, Stopped at 05/24/24 2342    dextrose 50 % injection 12.5 g, 12.5 g, intravenous, q15 min PRN, Veronica De La Paz MD, 12.5 g at 05/23/24 0822    dextrose 50 % injection 25 g, 25 g, intravenous, q15 min PRN, Mike Santana MD, 25 g at 05/22/24 0955    epoetin tyson-epbx (Retacrit) injection 8,000 Units, 100 Units/kg, subcutaneous, Every Mon/Wed/Fri, Eber Bruce DO, 8,000 Units at 05/24/24 2313    fentaNYL PF (Sublimaze) injection, , , PRN, Ronaldo Dempsey MD, 50 mcg at 05/24/24 1431    glucagon (Glucagen) injection 1 mg, 1 mg, intramuscular, q15 min PRN, Veronica De La Paz MD    heparin 1,000 unit/mL injection 3,100 Units, 3,100 Units, intra-catheter, After Dialysis, OSBALDO Wilson DNP, 3,100 Units at 05/24/24 1836    heparin 1,000 unit/mL injection 3,100 Units, 3,100 Units, intra-catheter, After Dialysis, OSBALDO Wilson DNP, 3,100 Units at 05/24/24 1835    heparin 1,000 unit/mL injection, , , PRN, Ronaldo Dempsey MD, 1,800 Units at 05/13/24 2329    insulin glargine (Lantus) injection 2 Units, 2 Units, subcutaneous, Nightly, Gibson Fortune MD    insulin lispro (HumaLOG) injection 0-5 Units, 0-5 Units, subcutaneous, TID, Veronica De La Paz MD, 1 Units at 05/25/24 1336    levothyroxine (Synthroid, Levoxyl) tablet 125 mcg, 125 mcg, oral, Daily before breakfast, OSBALDO Wilson DNP, 125 mcg at 05/25/24 0630    lidocaine (Xylocaine) 20 mg/mL (2 %) injection, , , PRN, Ronaldo Dempsey MD, 20 mL at 05/24/24 1430    lidocaine 4 % patch 1 patch, 1 patch, transdermal, Daily, Michelle Drummond PA-C, 1 patch at 05/25/24 0923    lidocaine PF (Xylocaine) 10 mg/mL (1 %) injection, , , PRN, Ronaldo Dempsey MD, 5 mL at 05/03/24 1816    loperamide (Imodium) capsule 2 mg, 2 mg, oral, q4h PRN, Andi Bazan, AMARILYS-MARGUERITE, DNP     midazolam (Versed) injection, , , PRN, Ronaldo Dempsey MD, 1 mg at 05/24/24 1428    midodrine (Proamatine) tablet 5 mg, 5 mg, oral, Before Dialysis, Eber Bruce DO, 5 mg at 05/25/24 1510    nitroglycerin (Nitrostat) SL tablet 0.4 mg, 0.4 mg, sublingual, q5 min PRN, OSBALDO Wilson, DNP    ondansetron (Zofran) injection 4 mg, 4 mg, intravenous, q6h PRN, OSBALDO Wilson, DNP    oxyCODONE (Roxicodone) immediate release tablet 5 mg, 5 mg, oral, q4h PRN, OSBALDO Wilson, DNP, 5 mg at 05/23/24 0855    oxygen (O2) therapy, , , Continuous PRN, Ronaldo Dempsey MD, Last Rate: 120,000 mL/hr at 05/03/24 1758, 2 L/min at 05/03/24 1758    oxygen (O2) therapy, , inhalation, Continuous PRN - O2/gases, OSBALDO Rousseau    oxygen (O2) therapy, , inhalation, Continuous PRN - O2/gases, OSBALDO Rousseau    oxygen (O2) therapy, , , Continuous PRN, Ronaldo Dempsey MD, Last Rate: 120,000 mL/hr at 05/24/24 1420, 2 L/min at 05/24/24 1420    pantoprazole (ProtoNix) injection 40 mg, 40 mg, intravenous, BID, OSBALDO Marin, 40 mg at 05/25/24 0930    polyethylene glycol (Glycolax, Miralax) packet 17 g, 17 g, oral, Daily, OSBALDO Wilson, DNP, 17 g at 05/22/24 1014    sennosides (Senokot) tablet 8.6 mg, 8.6 mg, oral, BID, OSBALDO Wilson, DNP, 8.6 mg at 05/22/24 1029    sevelamer carbonate (Renvela) tablet 2,400 mg, 2,400 mg, oral, TID, OSBALDO Wilson, LUCILA, 2,400 mg at 05/23/24 1751    simethicone (Mylicon) chewable tablet 80 mg, 80 mg, oral, q8h PRN, OSBALDO Wilson, DNP    [Held by provider] sodium zirconium cyclosilicate (Lokelma) packet 10 g, 10 g, oral, Daily, Eber Bruce DO, 10 g at 05/23/24 0839           Assessment/Plan   1.  ESKD on hemodialysis, right femoral tunneled dialysis catheter recently placed in IR  Hemodialysis will be completed this afternoon.  2.  Diabetes mellitus continue  current management  3.  Hypertension, continue current management   4.  Status post multiple admissions for sepsis and removal of dialysis catheters  5.  SIRS will follow all the consultants infectious disease      Principal Problem:    SIRS (systemic inflammatory response syndrome) (Multi)       I spent 35 minutes in the professional and overall care of this patient.      Gracie Hollins MD

## 2024-05-25 NOTE — CARE PLAN
The patient's goals for the shift include      The clinical goals for the shift include patient will remain safe and free from falls/injury overnight    Over the shift, the patient did make progress toward the following goals.   Problem: Pain  Goal: My pain/discomfort is manageable  Outcome: Progressing     Problem: Safety  Goal: Patient will be injury free during hospitalization  Outcome: Progressing

## 2024-05-25 NOTE — PROGRESS NOTES
"Edwar Hemphill is a 39 y.o. male on day 25 of admission presenting with SIRS (systemic inflammatory response syndrome) (Multi).    Subjective   Glycemic control has been reviewed.     I have reviewed histories, allergies and medications have been reviewed and there are no changes         Physical Exam  Vitals and nursing note reviewed.   Constitutional:       General: He is not in acute distress.     Appearance: Normal appearance. He is normal weight.   HENT:      Head: Normocephalic and atraumatic.      Nose: Nose normal.      Mouth/Throat:      Mouth: Mucous membranes are moist.   Eyes:      Extraocular Movements: Extraocular movements intact.   Neurological:      Mental Status: He is alert.         Last Recorded Vitals  Blood pressure 97/64, pulse 85, temperature 36.8 °C (98.3 °F), temperature source Oral, resp. rate 18, height 1.753 m (5' 9.02\"), weight 68.5 kg (151 lb), SpO2 99%.  Intake/Output last 3 Shifts:  I/O last 3 completed shifts:  In: 1000 (14.6 mL/kg) [I.V.:600 (8.8 mL/kg); Other:400]  Out: 1405 (20.5 mL/kg) [Other:1400; Blood:5]  Weight: 68.5 kg     Relevant Results  Results from last 7 days   Lab Units 05/25/24  0739 05/25/24  0612 05/24/24  2141 05/24/24  1646 05/24/24  1203 05/24/24  0828 05/24/24  0651 05/23/24  0813 05/23/24  0611 05/21/24  1208 05/21/24  1129 05/20/24  1146 05/20/24  1126   POCT GLUCOSE mg/dL 218*  --  219* 222* 248* 221*  --    < >  --    < >  --    < >  --    GLUCOSE mg/dL  --  209*  --   --   --   --  226*  --  48*  --  102*  --  68*    < > = values in this interval not displayed.     Scheduled medications  [Held by provider] apixaban, 5 mg, oral, BID  B complex-vitamin C-folic acid, 1 capsule, oral, Daily  ceftaroline, 200 mg, intravenous, q8h  DAPTOmycin, 700 mg, intravenous, q48h  epoetin tyson or biosimilar, 100 Units/kg, subcutaneous, Every Mon/Wed/Fri  heparin, 3,100 Units, intra-catheter, After Dialysis  heparin, 3,100 Units, intra-catheter, After Dialysis  insulin " lispro, 0-5 Units, subcutaneous, TID  levothyroxine, 125 mcg, oral, Daily before breakfast  lidocaine, 1 patch, transdermal, Daily  midodrine, 5 mg, oral, Before Dialysis  pantoprazole, 40 mg, intravenous, BID  polyethylene glycol, 17 g, oral, Daily  sennosides, 8.6 mg, oral, BID  sevelamer carbonate, 2,400 mg, oral, TID  [Held by provider] sodium zirconium cyclosilicate, 10 g, oral, Daily      Continuous medications  oxygen, , Last Rate: 2 L/min (05/03/24 1758)  oxygen, , Last Rate: 2 L/min (05/24/24 1420)      PRN medications  PRN medications: cyclobenzaprine, dextrose, dextrose, fentaNYL PF, glucagon, heparin, lidocaine, lidocaine PF, loperamide, midazolam, nitroglycerin, ondansetron, oxyCODONE, oxygen, oxygen, oxygen, oxygen, simethicone    Results for orders placed or performed during the hospital encounter of 04/30/24 (from the past 96 hour(s))   SST TOP   Result Value Ref Range    Extra Tube Hold for add-ons.    Blood Culture    Specimen: Peripheral Venipuncture; Blood culture   Result Value Ref Range    Blood Culture No growth at 3 days    Blood Culture    Specimen: Peripheral Venipuncture; Blood culture   Result Value Ref Range    Blood Culture No growth at 3 days    CBC   Result Value Ref Range    WBC 5.0 4.4 - 11.3 x10*3/uL    nRBC 0.0 0.0 - 0.0 /100 WBCs    RBC 2.63 (L) 4.50 - 5.90 x10*6/uL    Hemoglobin 7.2 (L) 13.5 - 17.5 g/dL    Hematocrit 23.6 (L) 41.0 - 52.0 %    MCV 90 80 - 100 fL    MCH 27.4 26.0 - 34.0 pg    MCHC 30.5 (L) 32.0 - 36.0 g/dL    RDW 17.5 (H) 11.5 - 14.5 %    Platelets 151 150 - 450 x10*3/uL   Renal function panel   Result Value Ref Range    Glucose 102 (H) 74 - 99 mg/dL    Sodium 132 (L) 136 - 145 mmol/L    Potassium 4.0 3.5 - 5.3 mmol/L    Chloride 98 98 - 107 mmol/L    Bicarbonate 25 21 - 32 mmol/L    Anion Gap 13 10 - 20 mmol/L    Urea Nitrogen 14 6 - 23 mg/dL    Creatinine 4.69 (H) 0.50 - 1.30 mg/dL    eGFR 15 (L) >60 mL/min/1.73m*2    Calcium 8.6 8.6 - 10.3 mg/dL    Phosphorus  4.8 2.5 - 4.9 mg/dL    Albumin 2.3 (L) 3.4 - 5.0 g/dL   POCT GLUCOSE   Result Value Ref Range    POCT Glucose 101 (H) 74 - 99 mg/dL   POCT GLUCOSE   Result Value Ref Range    POCT Glucose 107 (H) 74 - 99 mg/dL   POCT GLUCOSE   Result Value Ref Range    POCT Glucose 127 (H) 74 - 99 mg/dL   POCT GLUCOSE   Result Value Ref Range    POCT Glucose 40 (L) 74 - 99 mg/dL   POCT GLUCOSE   Result Value Ref Range    POCT Glucose 36 (L) 74 - 99 mg/dL   POCT GLUCOSE   Result Value Ref Range    POCT Glucose 104 (H) 74 - 99 mg/dL   POCT GLUCOSE   Result Value Ref Range    POCT Glucose 74 74 - 99 mg/dL   POCT GLUCOSE   Result Value Ref Range    POCT Glucose 82 74 - 99 mg/dL   POCT GLUCOSE   Result Value Ref Range    POCT Glucose 82 74 - 99 mg/dL   Lavender Top   Result Value Ref Range    Extra Tube Hold for add-ons.    Creatine Kinase   Result Value Ref Range    Creatine Kinase 52 0 - 325 U/L   Renal Function Panel   Result Value Ref Range    Glucose 48 (LL) 74 - 99 mg/dL    Sodium 134 (L) 136 - 145 mmol/L    Potassium 4.1 3.5 - 5.3 mmol/L    Chloride 97 (L) 98 - 107 mmol/L    Bicarbonate 26 21 - 32 mmol/L    Anion Gap 15 10 - 20 mmol/L    Urea Nitrogen 19 6 - 23 mg/dL    Creatinine 6.27 (H) 0.50 - 1.30 mg/dL    eGFR 11 (L) >60 mL/min/1.73m*2    Calcium 7.9 (L) 8.6 - 10.3 mg/dL    Phosphorus 5.2 (H) 2.5 - 4.9 mg/dL    Albumin 2.1 (L) 3.4 - 5.0 g/dL   POCT GLUCOSE   Result Value Ref Range    POCT Glucose 47 (L) 74 - 99 mg/dL   POCT GLUCOSE   Result Value Ref Range    POCT Glucose 79 74 - 99 mg/dL   POCT GLUCOSE   Result Value Ref Range    POCT Glucose 114 (H) 74 - 99 mg/dL   POCT GLUCOSE   Result Value Ref Range    POCT Glucose 136 (H) 74 - 99 mg/dL   POCT GLUCOSE   Result Value Ref Range    POCT Glucose 195 (H) 74 - 99 mg/dL   Renal Function Panel   Result Value Ref Range    Glucose 226 (H) 74 - 99 mg/dL    Sodium 131 (L) 136 - 145 mmol/L    Potassium 4.2 3.5 - 5.3 mmol/L    Chloride 95 (L) 98 - 107 mmol/L    Bicarbonate 23 21 - 32  mmol/L    Anion Gap 17 10 - 20 mmol/L    Urea Nitrogen 22 6 - 23 mg/dL    Creatinine 7.14 (H) 0.50 - 1.30 mg/dL    eGFR 9 (L) >60 mL/min/1.73m*2    Calcium 8.1 (L) 8.6 - 10.3 mg/dL    Phosphorus 5.6 (H) 2.5 - 4.9 mg/dL    Albumin 2.3 (L) 3.4 - 5.0 g/dL   POCT GLUCOSE   Result Value Ref Range    POCT Glucose 221 (H) 74 - 99 mg/dL   POCT GLUCOSE   Result Value Ref Range    POCT Glucose 248 (H) 74 - 99 mg/dL   POCT GLUCOSE   Result Value Ref Range    POCT Glucose 222 (H) 74 - 99 mg/dL   POCT GLUCOSE   Result Value Ref Range    POCT Glucose 219 (H) 74 - 99 mg/dL   Renal Function Panel   Result Value Ref Range    Glucose 209 (H) 74 - 99 mg/dL    Sodium 132 (L) 136 - 145 mmol/L    Potassium 4.5 3.5 - 5.3 mmol/L    Chloride 96 (L) 98 - 107 mmol/L    Bicarbonate 17 (L) 21 - 32 mmol/L    Anion Gap 24 (H) 10 - 20 mmol/L    Urea Nitrogen 17 6 - 23 mg/dL    Creatinine 5.17 (H) 0.50 - 1.30 mg/dL    eGFR 14 (L) >60 mL/min/1.73m*2    Calcium 8.7 8.6 - 10.3 mg/dL    Phosphorus 4.7 2.5 - 4.9 mg/dL    Albumin 2.2 (L) 3.4 - 5.0 g/dL   POCT GLUCOSE   Result Value Ref Range    POCT Glucose 218 (H) 74 - 99 mg/dL      IR CVC tunneled    Result Date: 5/24/2024  Interpreted By:  Ronaldo Dempsey, STUDY: IR CVC TUNNELED; ;  5/24/2024 2:56 pm   ULTRASOUND AND FLUOROSCOPICALLY GUIDED RIGHT COMMON FEMORAL VEIN TUNNELED HEMODIALYSIS CATHETER   INDICATION: Signs/Symptoms:Permcath placement. 39-year-old man with end-stage renal disease, chronic superior thoracic venous occlusion, clearance of bacteremia requires replacement of tunneled hemodialysis catheter.   COMPARISON: Ultrasound images from temporary catheter placement 05/13/2024, images from prior tunneled catheter exchange 05/03/2024, fluoroscopic images from prior tunnel catheter placement 01/22/2024, venography 01/19/2024   ACCESSION NUMBER(S): [id number]   ORDERING CLINICIAN: GREGORIA REGALADO   TECHNIQUE:   ATTENDING : Ronaldo Dempsey M.D.   TECHNICAL DESCRIPTION/FINDINGS: The  procedure, including all risks, benefits and alternatives were explained to the patient in detail. All questions were answered and written informed consent was obtained.   The patient was positioned supine on the fluoroscopy table. A time-out was performed.   The bilateral groins were prepped and draped in usual sterile fashion. 1% lidocaine was administered subcutaneously for local anesthesia. Using combination of fluoroscopic landmarks and real-time ultrasound guidance, the right common femoral vein was accessed in antegrade direction using micropuncture technique. Ultrasound images were saved. Under fluoroscopic visualization, an 018 guidewire was advanced into the inferior vena cava and a micropuncture transitional introducer was placed.   Through the micropuncture transitional introducer, an 035 stiff Glidewire was used to navigate known caval and inferior cavoatrial stenosis. The wire was coiled in the right atrium.   Additional 1% lidocaine was then administered subcutaneously for local anesthesia to anesthetize the subcutaneous tunnel tract. The dual lumen cuffed hemodialysis catheter was then tunneled from a right anterolateral proximal right thigh incision site and externalized overlying the venotomy. After serial dilation, a 15 Latvian peel-away sheath was placed. Through the sheath over the 035 stiff Glidewire, the 14.5 Latvian by 42 cm tip to cuff dual lumen hemodialysis catheter was then placed, and after sheath removal was further advanced over the Glidewire into the right atrium superior to the known inferior cavoatrial stenosis. The guidewire was removed. A completion fluoroscopic image demonstrates the catheter tip projecting over the inferior aspect of the right atrium.   Catheter lumens easily aspirated and flushed were locked with a concentrated heparinized solution (1000 units/cc). The catheter hub was sutured to the skin with 2 0 Prolene stay suture. Sterile dressing was applied.    SEDATION/MEDICATIONS: Continuous cardiopulmonary monitoring was performed by a radiology nurse for the duration of the procedure.  1 mg Versed and   50 mcg Fentanyl were administered for moderate conscious sedation time of 25 minutes.      15 cc 1% Lidocaine was administered subcutaneously for local anesthesia. SPECIMENS: None. ESTIMATED BLOOD LOSS:  5 cc FLOUROSCOPY:  1.6 minutes; DAP  7941 mGy-cm*2; Air Kerma  23.60 mGy CONTRAST: None.   FINDINGS: Test       Ultrasound and fluoroscopically guided right common femoral vein tunneled hemodialysis catheter. The catheter is ready for immediate use.     MACRO: None   Signed by: Ronaldo Dempsey 5/24/2024 10:21 PM Dictation workstation:   [id number]    IR CVC removal    Result Date: 5/24/2024  These images are not reportable by radiology and will not be interpreted by  Radiologists.    Electrocardiogram, 12-lead PRN ACS symptoms    Result Date: 5/20/2024  Atrial fibrillation with rapid ventricular response with premature ventricular or aberrantly conducted complexes Nonspecific ST and T wave abnormality , probably digitalis effect Abnormal ECG When compared with ECG of 16-JAN-2024 07:06, Atrial fibrillation has replaced Sinus rhythm ST elevation now present in Inferior leads Nonspecific T wave abnormality, worse in Lateral leads Confirmed by Brandon Nur ([id number]) on 5/20/2024 7:36:36 AM    IR CVC removal    Result Date: 5/16/2024  These images are not reportable by radiology and will not be interpreted by  Radiologists.    Transesophageal Echo (POLI)    Result Date: 5/14/2024   [facility], [address] [place] [address]              Tel [phone] and Fax [phone] TRANSESOPHAGEAL ECHOCARDIOGRAM REPORT  Patient Name:      XIOMARA Maya Physician:    [id number] Edward Hernandez DO Study Date:        5/14/2024           Ordering Provider:    [id number] ALKA VIEYRA MRN/PID:            [id number]            Fellow: Accession#:        [id number]        Nurse:                Ronaldo Woo RN Date of Birth/Age: 1985 / 39      Sonographer:          Alejandro Carroll RDCS                    years Gender:            M                   Additional Staff:     Kayden Matthews CRNA Height:            175.26 cm           Admit Date:           4/30/2024 Weight:            72.58 kg            Admission Status:     Inpatient -                                                              Routine BSA / BMI:         1.88 m2 / 23.63     Encounter#:           [id number]                    kg/m2                                        Department Location:  [facility] Non                                                              Invasive Blood Pressure: 116 /69 mmHg Study Type:    TRANSESOPHAGEAL ECHO (POLI) Diagnosis/ICD: Bacteremia-R78.81 Indication:    Staphyococcus aureus bacteremia, R/o endocarditis CPT Code:      POLI Complete-67055; Doppler Limited-65642; Color Doppler-78658 Patient History: Allergies:         Cashew nuts. Smoker:            Unknown. Diabetes:          Yes Insulin Pertinent History: HTN, Hyperlipidemia and PVD. 39 y.o. male presents for POLI                    for endocarditis rule out.                     PMH of ESRD and SIRS. Study Detail: The following Echo studies were performed: 2D. Agitated saline               used as a contrast agent for intraseptal flow evaluation. Total               contrast used for this procedure was 10 mL via IV push. Patient's               heart rhythm is sinus tachycardia. The patient was sedated.  PHYSICIAN INTERPRETATION: POLI Details: The POLI probe used was 5177. Technically adequate omniplane transesophageal echocardiogram performed. POLI Medication: The pharynx was anesthetized with Cetacaine spray and viscous lidocaine. The patient was sedated by Anesthesia; please refer to anesthesia  flow sheet for medications used. POLI Procedure: The probe was passed without difficulty. Left Ventricle: The left ventricular systolic function is mildly decreased, with an estimated ejection fraction of 35-40%. Wall motion is abnormal. The left ventricular cavity size was not assessed. Left ventricular diastolic filling was not assessed. Left Atrium: The left atrium is normal in size. There is no definite left atrial thrombus present. A bubble study using agitated saline was performed. Bubble study is negative. There is a normal sized left atrial appendage. Right Ventricle: The right ventricle is normal in size. There is normal right ventricular global systolic function. Right Atrium: The right atrium is normal in size. Aortic Valve: There is no visualized aortic valve vegetation. The aortic valve appears structurally normal. There is no evidence of aortic valve regurgitation. Mitral Valve: The mitral valve is normal in structure. There was no mitral valve vegetation visualized. There is trace mitral valve regurgitation. Tricuspid Valve: No vegetation is seen on the tricuspid valve. The tricuspid valve is structurally normal. There is trace tricuspid regurgitation. Pulmonic Valve: No pulmonic valve vegetation visualized. The pulmonic valve is structurally normal. There is no indication of pulmonic valve regurgitation. Pericardium: There is no pericardial effusion noted. Aorta: The aortic root is normal.  CONCLUSIONS:  1. Left ventricular systolic function is mildly decreased with a 35-40% estimated ejection fraction.  2. No mitral valve vegetation visualized.  3. No tricuspid valve vegetation.  4. No aortic valve vegetation visualized.  5. No pulmonic valve vegetation visualized.  6. No left atrial thrombus. QUANTITATIVE DATA SUMMARY:  [id number] Edward Hernandez DO Electronically signed on 5/14/2024 at 7:06:59 PM  ** Final **     IR CVC tunneled    Result Date: 5/13/2024  Interpreted By:  Ronaldo Dempsey, STUDY: IR  CVC TUNNELED; ;  5/13/2024 3:26 pm   ULTRASOUND FLUOROSCOPICALLY GUIDED RIGHT GROIN TEMPORARY HEMODIALYSIS CATHETER PLACEMENT   INDICATION: Signs/Symptoms:Temporary dialysis line today. 39-year-old man with end-stage renal disease, persistent bacteremia despite removal of groin tunneled hemodialysis catheter. Requires replacement of hemodialysis access.   COMPARISON: Fluoroscopic images from prior tunneled hemodialysis catheter exchange 05/13/2024   ACCESSION NUMBER(S): [id number]   ORDERING CLINICIAN: BRADEN QUIROGA   TECHNIQUE:   ATTENDING : Ronaldo Dempsey M.D.   TECHNICAL DESCRIPTION/FINDINGS: The procedure, including all risks, benefits and alternatives were explained to the patient in detail. All questions were answered and written informed consent was obtained.   Patient was positioned supine on the fluoroscopy table. A time-out was performed.   The bilateral groins were prepped and draped in usual sterile fashion. Focused ultrasound was performed over the superior aspect of the right common femoral vein demonstrating superior patency with partial compressibility. Ultrasound images were saved. 1% lidocaine was administered subcutaneously for local anesthesia. Under real-time ultrasound guidance, the right common femoral vein was accessed in antegrade direction using micropuncture technique. Ultrasound images were saved. Under fluoroscopic visualization, an 018 guidewire was advanced into the right common iliac vein and micropuncture transitional introducer was utilized for placement of an 035 guidewire into the IVC. After serial dilation, a 13 Afghan by 20 cm Trialysis catheter was then placed. A completion fluoroscopic image was obtained demonstrating the tip of the temporary hemodialysis catheter projecting over the IV C.   Catheter lumens easily aspirated and flushed. Large-bore dialysis lumens were locked with a concentrated heparinized solution (1000 units/cc). A small bore 3rd lumen was  locked with a dilute heparinized solution. The catheter hub was sutured to the skin with 2 0 Prolene stay suture. Sterile dressing was applied.   SEDATION/MEDICATIONS: Continuous cardiopulmonary monitoring was performed by a radiology nurse for the duration of the procedure. 0.5 mg Versed and   25 mcg Fentanyl were administered for moderate conscious sedation time of 20 minutes.      10 cc 1% Lidocaine was administered subcutaneously for local anesthesia. SPECIMENS: None. ESTIMATED BLOOD LOSS:  5 cc FLOUROSCOPY:  0.1 minutes; DAP  2405 mGy-cm*2; Air Kerma  6.58 mGy CONTRAST: None.   FINDINGS: Test       Ultrasound fluoroscopically guided right groin temporary hemodialysis catheter. The catheter is ready for immediate use.     MACRO: None   Signed by: Ronaldo Dempsey 5/13/2024 4:59 PM Dictation workstation:   [id number]    MR lumbar spine wo IV contrast    Result Date: 5/4/2024  Interpreted By:  Eva Og and Muddasani Dheeraj STUDY: MR LUMBAR SPINE WO IV CONTRAST   INDICATION: Signs/Symptoms:back pain new with bacteremia, possible infection   COMPARISON: CT of the lumbar spine 04/30/2024. Body CT 01/14/2024.   ACCESSION NUMBER(S): [id number]   ORDERING CLINICIAN: ALKA VIEYRA   TECHNIQUE: Sagittal T1, sagittal T2, sagittal STIR, axial T1 and axial T2 weighted MRI images of the lumbar spine were obtained without intravenous contrast administration.   FINDINGS: Image quality is somewhat degraded due to motion and technique.   There are 5 non-rib-bearing lumbar-type vertebral bodies. The last well-formed intervertebral disc is labeled L5-S1.   Minimal retrolisthesis at L5-S1. Alignment is otherwise normal.   Vertebral body heights are maintained. Disc spaces are relatively maintained.   There is diffusely hypointense bone marrow signal on the T1 weighted images which may be related to renal osteodystrophy. Differential considerations include anemia, reactive marrow or bone marrow infiltrating process.    There is no increased STIR signal to suggest marrow edema. Multilevel degenerative endplate changes include prominent Schmorl's nodes most pronounced at L3-4 where there are Modic type 2 changes and central intradiscal T1 and T2 hypointense signal likely degenerative.   The conus medullaris terminates at L1-2. The visualized spinal cord, conus medullaris and cauda equina demonstrate normal signal and morphology.   Fatty atrophic changes involve the paravertebral musculature. Partially visualized kidneys demonstrate atrophic changes with multiple bilateral cysts, better characterized on previous body CT dated 01/14/2024. Partially visualized catheter within the inferior vena cava and right common iliac vein.   T10-11 on sagittal images only: No spinal canal or neural foraminal stenosis.   T11-12 on sagittal images only: No spinal canal or neural foraminal stenosis.   T12-L1: Facet arthropathy and ligamentum flavum thickening. No spinal canal or neural foraminal stenosis.   L1-2: Facet arthropathy and ligamentum flavum thickening. No spinal canal or neural foraminal stenosis.   L2-3: Facet arthropathy and ligamentum flavum thickening. No spinal canal or neural foraminal stenosis.   L3-4: Disc bulge, facet arthropathy and ligamentum flavum thickening. No spinal canal or neural foraminal stenosis.   L4-5: Disc bulge with small central protrusion, facet arthropathy and ligamentum flavum thickening. No spinal canal stenosis. Mild bilateral neural foraminal stenosis.   L5-S1: Disc bulge, facet arthropathy and ligamentum flavum thickening. No spinal canal stenosis. Mild right and no left neural foraminal stenosis.   Degenerative changes are noted at the sacroiliac joints.       Diffusely hypointense bone marrow signal on the T1 weighted images may be related to renal osteodystrophy. Differential considerations include anemia, reactive bone marrow or bone marrow infiltrating process.   No definite imaging findings to suggest  discitis osteomyelitis on the current exam.   Degenerative changes without significant spinal canal stenosis. Varying degrees of mild neural foraminal narrowing as detailed above.   I personally reviewed the images/study and I agree with the findings as stated by Dr. Lowe.   MACRO: None   Signed by: Eva Og 5/4/2024 11:11 AM Dictation workstation:   [id number]    IR CVC exchange    Result Date: 5/3/2024  Interpreted By:  Ronaldo Dempsey, STUDY: IR CVC EXCHANGE; ;  5/3/2024 6:28 pm   FLUOROSCOPICALLY GUIDED EXCHANGE OF RIGHT GROIN TUNNELED HEMODIALYSIS CATHETER   INDICATION: Signs/Symptoms:Bacteremia- Right fem TDC exchange. 39-year-old man with end-stage renal disease, central thoracic venous occlusion, end-stage vascular access with bacteremia. Guidewire exchange of right groin tunneled hemodialysis catheter in an attempt to salvage the access.   COMPARISON: Chest radiograph 04/30/2024, fluoroscopic images from prior tunneled hemodialysis catheter placement 01/22/2024   ACCESSION NUMBER(S): [id number]   ORDERING CLINICIAN: SACHI MORTENSEN   TECHNIQUE:   ATTENDING : Ronaldo Dempsey M.D.   TECHNICAL DESCRIPTION/FINDINGS: The procedure, including all risks, benefits and alternatives were explained to the patient in detail. All questions were answered and written informed consent was obtained.   The patient was positioned supine on the angiography table. A time-out was performed.   The right groin was prepped and draped in usual sterile fashion. A  fluoroscopic image was obtained demonstrating the tunneled hemodialysis catheter terminating at the inferior cavoatrial junction.   1% lidocaine was administered via the subcutaneous tunnel tract for local anesthesia. Blunt dissection was performed to free the subcutaneous catheter cuff. An 035 stiff Glidewire was then advanced through the catheter into the right atrium. The old catheter was removed. Over the guidewire, a new 14.5 Latvian by  42 cm tip to cuff dual-lumen hemodialysis catheter was then placed. A completion fluoroscopic image demonstrates the catheter in the inferior right atrium.   Catheter lumens easily aspirated and flushed and were locked with a concentrated heparinized solution (1000 units/cc). The catheter hub was then sutured to the skin with 2 0 Prolene stay suture. Because of losing along the tract, Gel-Foam pledgets were then administered subcutaneously near where the catheter enters the skin for additional hemostasis, followed by a pursestring suture. A sterile dressing was applied.   SEDATION/MEDICATIONS: Continuous cardiopulmonary monitoring was performed by a radiology nurse for the duration of the procedure.  1 mg Versed and   50 mcg Fentanyl were administered for moderate conscious sedation time of 20 minutes.      10 cc 1% Lidocaine was administered subcutaneously for local anesthesia. SPECIMENS: None. ESTIMATED BLOOD LOSS:  5 cc FLOUROSCOPY:  1.2 minutes; DAP  30879 mGy-cm*2; Air Kerma  59.07 mGy CONTRAST: None.   FINDINGS: Test       Fluoroscopically guidewire exchange of right groin tunneled hemodialysis catheter. The newly exchanged catheter is ready for immediate use.     MACRO: None   Signed by: Ronaldo Dempsey 5/3/2024 8:09 PM Dictation workstation:   [id number]    Transthoracic Echo Complete    Result Date: 5/3/2024   [facility], [address] [place] [address]              Tel [phone] and Fax [phone] TRANSTHORACIC ECHOCARDIOGRAM REPORT  Patient Name:      XIOMARA Maya Physician:    [id number] Bradnon Nur DO Study Date:        5/3/2024             Ordering Provider:    [id number] ALKA VIEYRA MRN/PID:           [id number]             Fellow: Accession#:        [id number]         Nurse: Date of Birth/Age: 1985 / 39 years Sonographer:          Sy Lyons RDCS Gender:            M                     Additional Staff: Height:            175.00 cm            Admit Date: Weight:            65.80 kg             Admission Status:     Inpatient -                                                               Routine BSA / BMI:         1.80 m2 / 21.49      Encounter#:           [id number]                    kg/m2                                         Department Location:  [facility] Non                                                               Invasive Blood Pressure: 112 /74 mmHg Study Type:    TRANSTHORACIC ECHO (TTE) COMPLETE Diagnosis/ICD: Endocarditis, valve unspecified-I38 Indication:    endocarditis CPT Code:      Echo Complete w Full Doppler-[id number] Patient History: Pertinent History: HTN and Hyperlipidemia. Study Detail: The following Echo studies were performed: M-Mode, Doppler, color               flow and 2D.  PHYSICIAN INTERPRETATION: Left Ventricle: The left ventricular systolic function is moderately decreased, with an estimated ejection fraction of 35-40%. There are no regional wall motion abnormalities. The left ventricular cavity size is normal. Abnormal (paradoxical) septal motion, consistent with left bundle branch block. Spectral Doppler shows an impaired relaxation pattern of left ventricular diastolic filling. Left Atrium: The left atrium is normal in size. Right Ventricle: The right ventricle is normal in size. There is normal right ventricular global systolic function. Right Atrium: The right atrium is normal in size. Aortic Valve: The aortic valve is probably trileaflet. There is no evidence of aortic valve regurgitation. Mitral Valve: The mitral valve is mildly thickened. There is trace mitral valve regurgitation. Tricuspid Valve: The tricuspid valve is structurally normal. No evidence of tricuspid regurgitation. Pulmonic Valve: The pulmonic valve is structurally normal. There is no indication of pulmonic valve regurgitation. Pericardium: There is no pericardial effusion noted. Aorta: The  aortic root is normal. In comparison to the previous echocardiogram(s): Compared with study from 1/16/2024, LV function has declined. Was previously normal.  CONCLUSIONS:  1. Left ventricular systolic function is moderately decreased with a 35-40% estimated ejection fraction.  2. Abnormal septal motion consistent with left bundle branch block.  3. Spectral Doppler shows an impaired relaxation pattern of left ventricular diastolic filling.  4. No vegetations visualized within the limitations of this study.  5. Compared with study from 1/16/2024, LV function has declined. Was previously normal. QUANTITATIVE DATA SUMMARY: LA VOLUME:                               Normal Ranges: LA Vol A4C:        40.1 ml    (22+/-6mL/m2) LA Vol A2C:        35.3 ml LA Vol BP:         40.9 ml LA Vol Index A4C:  22.3ml/m2 LA Vol Index A2C:  19.6 ml/m2 LA Vol Index BP:   22.7 ml/m2 LA Area A4C:       15.3 cm2 LA Area A2C:       13.2 cm2 LA Major Axis A4C: 5.0 cm LA Major Axis A2C: 4.2 cm LA Volume Index:   22.7 ml/m2  [id number] Brandon Nur DO Electronically signed on 5/3/2024 at 4:24:04 PM  ** Final **     Lower extremity venous duplex right    Result Date: 5/1/2024             [place] [address]   Tel [phone] and Fax [phone]  Vascular Lab Report VASC US LOWER EXTREMITY VENOUS DUPLEX RIGHT  Patient Name:      XIOMARA Maya Physician:  [id number] Rayo Jauregui MD, RPVI Study Date:        4/30/2024            Ordering Physician: [id number] FARMER MRN/PID:           [id number]             Technologist:       Megan Shay RVT Accession#:        [id number]         Technologist 2: Date of Birth/Age: 1985 / 39 years Encounter#:         [id number] Gender:            M Admission Status:  Emergency            Location Performed: [facility]  Diagnosis/ICD: Pain in right leg-M79.604 CPT Codes:     15512 Peripheral  venous duplex scan for DVT Limited  **CRITICAL RESULT** Critical Result: Acute DVT in the right leg. Notification called to Deep Paniagua PA-C on 4/30/2024 at 10:52:21 AM by Megan Shay RDMS, RVT.  CONCLUSIONS: Right Lower Venous: There is acute non-occlusive deep vein thrombosis visualized in the common femoral vein. There are chronic changes visualized in the popliteal vein. Enlarged lymph node noted in the right groin. Waveforms suggestive of more distal obstruction/thrombus. May wish further means of imaging evaluation. Left Lower Venous: There are chronic changes visualized in the common femoral vein.  Comparison: Compared with study from 7/27/2023, New finding of acute non-occlusive thrombus in the right CFV.  Imaging & Doppler Findings:  Right                 Compressible      Thrombus          Flow Distal External Iliac                     None CFV                     Partial    Acute non-occlusive Continuous PFV                       Yes             None FV Proximal               Yes             None         Continuous FV Mid                    Yes             None FV Distal                 Yes             None Popliteal                 Yes            Chronic       Continuous Peroneal                  Yes             None PTV                       Yes             None  Left Compress Thrombus        Flow CFV    Yes    Chronic  Spontaneous/Phasic  [id number] Rayo Jauregui MD, MARIANO Electronically signed by [id number] MARIANO Call MD on 5/1/2024 at 9:13:07 AM  ** Final **     XR chest 1 view    Result Date: 4/30/2024  STUDY: Chest Radiograph;  4/30/2024 at 2:42 PM. INDICATION: Fever. COMPARISON: XR chest 1/14/2024, 11/10/2023; CTA chest 2/15/2023. ACCESSION NUMBER(S): [id number] ORDERING CLINICIAN: WENDY INFANTE TECHNIQUE:  Frontal chest was obtained at 14:42 hours. FINDINGS: CARDIOMEDIASTINAL SILHOUETTE: Cardiomediastinal silhouette is normal in size and configuration.  LUNGS: Patchy right basilar infiltrate or  atelectasis with small right pleural effusion. Left basilar scar versus atelectasis. ABDOMEN: No remarkable upper abdominal findings.  BONES: No acute osseous changes.    Patchy right basilar infiltrate or atelectasis with small right pleural effusion. Signed by Joseph Overton MD    CT thoracic spine wo IV contrast    Result Date: 4/30/2024  STUDY: CT Thoracic Spine and Lumbar Spine without IV Contrast; 4/30/2024 11:11 AM INDICATION: Midline back pain. COMPARISON: None Available. ACCESSION NUMBER(S): [id number], [id number] ORDERING CLINICIAN: RAJNI FARMER TECHNIQUE:  CT of the thoracic spine and lumbar spine was performed without intravenous or intrathecal contrast.  Sagittal and coronal reconstructions were generated.  Automated mA/kV exposure control was utilized and patient examination was performed in strict accordance with principles of ALARA. FINDINGS: Portions of the T1 vertebral body, as well as a small portion of the anterior superior aspect of T2 is cut off on this study.  No compression deformities are visualized within the thoracic vertebral bodies from T2 through T12.  No spondylolisthesis is noted.  No pedicular defects are noted.  No prominent edema is appreciated within the adjacent posterior paravertebral musculature.  Evaluation of the thecal sac is limited due to lack of myelographic contrast.  There are enlarged lymph nodes seen within the visualized portions of the mediastinum.  There are bilateral lower lobe infiltrates with tiny bilateral effusions.  Coronary artery calcifications are present. No compression deformities are visualized within the lumbar spine.  No pars defects are noted.  No significant spondylolisthesis is seen.  No prominent edema is appreciated within the visualized portions of the psoas musculature.  A long dialysis catheter is visualized within the inferior vena cava.  Evaluation of the thecal sac is limited due to lack of myelographic contrast.  There is disc bulging  seen from L3 through S1.  No significant impingement is appreciated upon the neural foramina on the sagittal reconstructions.    Thoracic spine: 1.  Portions of T1 and T2 cough on this study. 2.  No acute osseous findings seen from T3 through T12. 3.  Tiny bilateral effusions with adjacent lower lobe infiltrates. 4.  The distal adenopathy. 5.  Coronary artery calcifications. Lumbar spine: 1.  No compression deformities or spinal listhesis is noted. 2.  Disc bulging from L3 to S1.  Evaluation for spinal stenosis is limited due to lack mammographic contrast. 3.  Incidental note of a long dialysis catheter within the inferior vena cava. Signed by Deejay Hoffman MD    CT lumbar spine wo IV contrast    Result Date: 4/30/2024  STUDY: CT Thoracic Spine and Lumbar Spine without IV Contrast; 4/30/2024 11:11 AM INDICATION: Midline back pain. COMPARISON: None Available. ACCESSION NUMBER(S): [id number], [id number] ORDERING CLINICIAN: RAJNI FARMER TECHNIQUE:  CT of the thoracic spine and lumbar spine was performed without intravenous or intrathecal contrast.  Sagittal and coronal reconstructions were generated.  Automated mA/kV exposure control was utilized and patient examination was performed in strict accordance with principles of ALARA. FINDINGS: Portions of the T1 vertebral body, as well as a small portion of the anterior superior aspect of T2 is cut off on this study.  No compression deformities are visualized within the thoracic vertebral bodies from T2 through T12.  No spondylolisthesis is noted.  No pedicular defects are noted.  No prominent edema is appreciated within the adjacent posterior paravertebral musculature.  Evaluation of the thecal sac is limited due to lack of myelographic contrast.  There are enlarged lymph nodes seen within the visualized portions of the mediastinum.  There are bilateral lower lobe infiltrates with tiny bilateral effusions.  Coronary artery calcifications are present. No compression  deformities are visualized within the lumbar spine.  No pars defects are noted.  No significant spondylolisthesis is seen.  No prominent edema is appreciated within the visualized portions of the psoas musculature.  A long dialysis catheter is visualized within the inferior vena cava.  Evaluation of the thecal sac is limited due to lack of myelographic contrast.  There is disc bulging seen from L3 through S1.  No significant impingement is appreciated upon the neural foramina on the sagittal reconstructions.    Thoracic spine: 1.  Portions of T1 and T2 cough on this study. 2.  No acute osseous findings seen from T3 through T12. 3.  Tiny bilateral effusions with adjacent lower lobe infiltrates. 4.  The distal adenopathy. 5.  Coronary artery calcifications. Lumbar spine: 1.  No compression deformities or spinal listhesis is noted. 2.  Disc bulging from L3 to S1.  Evaluation for spinal stenosis is limited due to lack mammographic contrast. 3.  Incidental note of a long dialysis catheter within the inferior vena cava. Signed by Deejay Hoffman MD    XR pelvis 1-2 views    Result Date: 4/30/2024  STUDY: Pelvis Radiographs; 4/30/2024 at 9:06 AM. INDICATION: Pain. COMPARISON: CT pelvis 7/17/2023. ACCESSION NUMBER(S): [id number] ORDERING CLINICIAN: RAJNI FARMER TECHNIQUE:  One view(s) of the pelvis. FINDINGS:  The pelvic ring is intact.  There is no acute fracture.  Severe underlying osteopenia.    Slightly limited secondary to underlying osteopenia.  No acute osseous abnormality. Signed by Roe Ruiz MD     Assessment/Plan   Principal Problem:    SIRS (systemic inflammatory response syndrome) (Multi)    IMPRESSION  DIABETIC KETOACIDOSIS, RESOLVED  TYPE 2 DIABETES MELLITUS  LONG TERM CURRENT INSULIN USE  Hypoglycemia on minimal glargine, currently held     RECOMMENDATIONS  To commence Lantus 2 units subcutaneous bedtime given progressive hyperglycemia   Continue insulin correction scale TID AC   Diabetic diet as  tolerated   Accu-Cheks ACHS    Hypoglycemic protocol   Will continue to follow         Gibson Fortune MD

## 2024-05-25 NOTE — PRE-PROCEDURE NOTE
Report from Sending RN:    Report From: Marlyn ROBERTSON  Recent Surgery of Procedure: Yes  Baseline Level of Consciousness (LOC): aox4  Oxygen Use: No  Type: na  Diabetic: Yes  Last BP Med Given Day of Dialysis see mar  Last Pain Med Given: see mar  Lab Tests to be Obtained with Dialysis: No  Blood Transfusion to be Given During Dialysis: No  Available IV Access: Yes  Medications to be Administered During Dialysis: No  Continuous IV Infusion Running: Yes  Restraints on Currently or in the Last 24 Hours: No  Hand-Off Communication: Pt is alert and stable and ready for tx  Dialysis Catheter Dressing: Clean and Dry  Last Dressing Change: 5/25/24

## 2024-05-26 VITALS
HEIGHT: 69 IN | OXYGEN SATURATION: 92 % | BODY MASS INDEX: 22.36 KG/M2 | TEMPERATURE: 98.4 F | RESPIRATION RATE: 18 BRPM | SYSTOLIC BLOOD PRESSURE: 104 MMHG | DIASTOLIC BLOOD PRESSURE: 65 MMHG | WEIGHT: 151 LBS | HEART RATE: 88 BPM

## 2024-05-26 LAB
GLUCOSE BLD MANUAL STRIP-MCNC: 154 MG/DL (ref 74–99)
GLUCOSE BLD MANUAL STRIP-MCNC: 157 MG/DL (ref 74–99)

## 2024-05-26 PROCEDURE — C9113 INJ PANTOPRAZOLE SODIUM, VIA: HCPCS

## 2024-05-26 PROCEDURE — 82947 ASSAY GLUCOSE BLOOD QUANT: CPT

## 2024-05-26 PROCEDURE — 99232 SBSQ HOSP IP/OBS MODERATE 35: CPT | Performed by: INTERNAL MEDICINE

## 2024-05-26 PROCEDURE — 2500000001 HC RX 250 WO HCPCS SELF ADMINISTERED DRUGS (ALT 637 FOR MEDICARE OP): Performed by: NURSE PRACTITIONER

## 2024-05-26 PROCEDURE — 2500000004 HC RX 250 GENERAL PHARMACY W/ HCPCS (ALT 636 FOR OP/ED)

## 2024-05-26 PROCEDURE — 2500000005 HC RX 250 GENERAL PHARMACY W/O HCPCS: Performed by: PHYSICIAN ASSISTANT

## 2024-05-26 PROCEDURE — 2500000004 HC RX 250 GENERAL PHARMACY W/ HCPCS (ALT 636 FOR OP/ED): Performed by: INTERNAL MEDICINE

## 2024-05-26 RX ADMIN — CEFTAROLINE FOSAMIL 200 MG: 600 POWDER, FOR SOLUTION INTRAVENOUS at 05:55

## 2024-05-26 RX ADMIN — NEPHROCAP 1 CAPSULE: 1 CAP ORAL at 08:39

## 2024-05-26 RX ADMIN — LIDOCAINE 4% 1 PATCH: 40 PATCH TOPICAL at 08:39

## 2024-05-26 RX ADMIN — PANTOPRAZOLE SODIUM 40 MG: 40 INJECTION, POWDER, FOR SOLUTION INTRAVENOUS at 08:50

## 2024-05-26 RX ADMIN — LEVOTHYROXINE SODIUM 125 MCG: 125 TABLET ORAL at 07:44

## 2024-05-26 ASSESSMENT — COGNITIVE AND FUNCTIONAL STATUS - GENERAL
STANDING UP FROM CHAIR USING ARMS: TOTAL
MOVING TO AND FROM BED TO CHAIR: TOTAL
WALKING IN HOSPITAL ROOM: TOTAL
MOVING FROM LYING ON BACK TO SITTING ON SIDE OF FLAT BED WITH BEDRAILS: A LITTLE
WALKING IN HOSPITAL ROOM: TOTAL
DAILY ACTIVITIY SCORE: 15
MOBILITY SCORE: 10
MOBILITY SCORE: 10
DRESSING REGULAR UPPER BODY CLOTHING: A LOT
CLIMB 3 TO 5 STEPS WITH RAILING: TOTAL
DRESSING REGULAR UPPER BODY CLOTHING: A LOT
DAILY ACTIVITIY SCORE: 15
TURNING FROM BACK TO SIDE WHILE IN FLAT BAD: A LITTLE
TOILETING: A LOT
TURNING FROM BACK TO SIDE WHILE IN FLAT BAD: A LITTLE
MOVING TO AND FROM BED TO CHAIR: TOTAL
CLIMB 3 TO 5 STEPS WITH RAILING: TOTAL
PERSONAL GROOMING: A LITTLE
MOVING FROM LYING ON BACK TO SITTING ON SIDE OF FLAT BED WITH BEDRAILS: A LITTLE
PERSONAL GROOMING: A LITTLE
HELP NEEDED FOR BATHING: A LOT
HELP NEEDED FOR BATHING: A LOT
STANDING UP FROM CHAIR USING ARMS: TOTAL
DRESSING REGULAR LOWER BODY CLOTHING: A LOT
TOILETING: A LOT
DRESSING REGULAR LOWER BODY CLOTHING: A LOT

## 2024-05-26 ASSESSMENT — PAIN - FUNCTIONAL ASSESSMENT
PAIN_FUNCTIONAL_ASSESSMENT: 0-10
PAIN_FUNCTIONAL_ASSESSMENT: 0-10

## 2024-05-26 ASSESSMENT — PAIN SCALES - GENERAL
PAINLEVEL_OUTOF10: 0 - NO PAIN
PAINLEVEL_OUTOF10: 0 - NO PAIN

## 2024-05-26 ASSESSMENT — PAIN DESCRIPTION - LOCATION: LOCATION: BACK

## 2024-05-26 NOTE — PROGRESS NOTES
Edwar Hemphill is a 39 y.o. male on day 26 of admission presenting with SIRS (systemic inflammatory response syndrome) (Multi).      Subjective   No new issues       Objective     Last Recorded Vitals  BP 97/64 (BP Location: Left arm, Patient Position: Lying)   Pulse 80   Temp 36.7 °C (98.1 °F) (Temporal)   Resp 18   Wt 68.5 kg (151 lb)   SpO2 99%   Intake/Output last 3 Shifts:    Intake/Output Summary (Last 24 hours) at 5/26/2024 0830  Last data filed at 5/25/2024 1900  Gross per 24 hour   Intake 1200 ml   Output 1400 ml   Net -200 ml       Admission Weight  Weight: 65.8 kg (145 lb) (04/30/24 0825)    Daily Weight  05/23/24 : 68.5 kg (151 lb)    Image Results  IR CVC tunneled  Narrative: Interpreted By:  Ronaldo Dempsey,   STUDY:  IR CVC TUNNELED; ;  5/24/2024 2:56 pm      ULTRASOUND AND FLUOROSCOPICALLY GUIDED RIGHT COMMON FEMORAL VEIN  TUNNELED HEMODIALYSIS CATHETER      INDICATION:  Signs/Symptoms:Permcath placement. 39-year-old man with end-stage  renal disease, chronic superior thoracic venous occlusion, clearance  of bacteremia requires replacement of tunneled hemodialysis catheter.      COMPARISON:  Ultrasound images from temporary catheter placement 05/13/2024,  images from prior tunneled catheter exchange 05/03/2024, fluoroscopic  images from prior tunnel catheter placement 01/22/2024, venography  01/19/2024      ACCESSION NUMBER(S):  [id number]      ORDERING CLINICIAN:  GREGORIA REGALADO      TECHNIQUE:      ATTENDING : Ronaldo Dempsey M.D.      TECHNICAL DESCRIPTION/FINDINGS: The procedure, including all risks,  benefits and alternatives were explained to the patient in detail.  All questions were answered and written informed consent was obtained.      The patient was positioned supine on the fluoroscopy table. A  time-out was performed.      The bilateral groins were prepped and draped in usual sterile  fashion. 1% lidocaine was administered subcutaneously for local  anesthesia. Using  combination of fluoroscopic landmarks and real-time  ultrasound guidance, the right common femoral vein was accessed in  antegrade direction using micropuncture technique. Ultrasound images  were saved. Under fluoroscopic visualization, an 018 guidewire was  advanced into the inferior vena cava and a micropuncture transitional  introducer was placed.      Through the micropuncture transitional introducer, an 035 stiff  Glidewire was used to navigate known caval and inferior cavoatrial  stenosis. The wire was coiled in the right atrium.      Additional 1% lidocaine was then administered subcutaneously for  local anesthesia to anesthetize the subcutaneous tunnel tract. The  dual lumen cuffed hemodialysis catheter was then tunneled from a  right anterolateral proximal right thigh incision site and  externalized overlying the venotomy. After serial dilation, a 15  Stateless peel-away sheath was placed. Through the sheath over the 035  stiff Glidewire, the 14.5 Stateless by 42 cm tip to cuff dual lumen  hemodialysis catheter was then placed, and after sheath removal was  further advanced over the Glidewire into the right atrium superior to  the known inferior cavoatrial stenosis. The guidewire was removed. A  completion fluoroscopic image demonstrates the catheter tip  projecting over the inferior aspect of the right atrium.      Catheter lumens easily aspirated and flushed were locked with a  concentrated heparinized solution (1000 units/cc). The catheter hub  was sutured to the skin with 2 0 Prolene stay suture. Sterile  dressing was applied.      SEDATION/MEDICATIONS: Continuous cardiopulmonary monitoring was  performed by a radiology nurse for the duration of the procedure.  1  mg Versed and   50 mcg Fentanyl were administered for moderate  conscious sedation time of 25 minutes.      15 cc 1% Lidocaine was  administered subcutaneously for local anesthesia. SPECIMENS: None.  ESTIMATED BLOOD LOSS:  5 cc  FLOUROSCOPY:  1.6  minutes; DAP  7941 mGy-cm*2; Air Kerma  23.60 mGy  CONTRAST: None.      FINDINGS:  Test      Impression: Ultrasound and fluoroscopically guided right common femoral vein  tunneled hemodialysis catheter. The catheter is ready for immediate  use.          MACRO:  None      Signed by: Ronaldo Dempsey 5/24/2024 10:21 PM  Dictation workstation:   [id number]  IR CVC removal  These images are not reportable by radiology and will not be interpreted   by  Radiologists.      PHYSICAL EXAM  NEUROLOGICAL: No abnormal movements, no changes from before  HEENT: Head atraumatic, perrl,eomi, no oral lesions, no ear rash   NECK: Supple, no ln, jvp flat, thyroid palp  HEART: S1, S2, no added sound  LUNGS: CTAB, no crackles, no rales, no wheezing, no dullness to percussion, sym exp  EXTREMITIES: no edema  ABDOMEN: Soft, nontender, bowel sound positive, no organomegaly  SKIN: No change  EYES: No changes, perrl, eomi,   ENT: No change    JOINT: No change  Relevant Results               Assessment/Plan        Principal Problem:    SIRS (systemic inflammatory response syndrome) (Multi)    Esrd  Anemia of renal origin   T2dm  Pvd      Malnutrition Diagnosis Status: New  Malnutrition Diagnosis: Severe malnutrition related to chronic disease or condition  As Evidenced by: 18% weight loss over 8 months, intake <75% meals for > 1 month.  I agree with the dietitian's malnutrition diagnosis.     t32    Mauricio Estrada MD

## 2024-05-26 NOTE — PROGRESS NOTES
05/26/24 1121   Discharge Planning   Patient expects to be discharged to: [place] pt     [facility] called pt annel Maki and left a VM pt will return to [place] [facility] today. Pick uo time 330 pm.

## 2024-05-26 NOTE — PROGRESS NOTES
"Edwar Hemphill is a 39 y.o. male on day 26 of admission presenting with SIRS (systemic inflammatory response syndrome) (Multi).    Subjective   Patient sleeping.  Glycemic control has been reviewed.     I have reviewed histories, allergies and medications have been reviewed and there are no changes       Objective     Physical Exam  Vitals and nursing note reviewed.   Constitutional:       General: He is not in acute distress.     Appearance: Normal appearance. He is normal weight.   HENT:      Head: Normocephalic and atraumatic.      Nose: Nose normal.      Mouth/Throat:      Mouth: Mucous membranes are moist.   Eyes:      Extraocular Movements: Extraocular movements intact.   Pulmonary:      Effort: Pulmonary effort is normal.   Neurological:      Mental Status: He is alert.         Last Recorded Vitals  Blood pressure 104/65, pulse 88, temperature 36.9 °C (98.4 °F), temperature source Temporal, resp. rate 18, height 1.753 m (5' 9.02\"), weight 68.5 kg (151 lb), SpO2 92%.  Intake/Output last 3 Shifts:  I/O last 3 completed shifts:  In: 1200 (17.5 mL/kg) [I.V.:800 (11.7 mL/kg); Other:400]  Out: 1400 (20.4 mL/kg) [Other:1400]  Weight: 68.5 kg     Relevant Results  Results from last 7 days   Lab Units 05/26/24  0844 05/25/24  2039 05/25/24  1549 05/25/24  1218 05/25/24  0739 05/25/24  0612 05/24/24  0828 05/24/24  0651 05/23/24  0813 05/23/24  0611 05/21/24  1208 05/21/24  1129 05/20/24  1146 05/20/24  1126   POCT GLUCOSE mg/dL 157* 100* 134* 198* 218*  --    < >  --    < >  --    < >  --    < >  --    GLUCOSE mg/dL  --   --   --   --   --  209*  --  226*  --  48*  --  102*  --  68*    < > = values in this interval not displayed.     Scheduled medications  [Held by provider] apixaban, 5 mg, oral, BID  B complex-vitamin C-folic acid, 1 capsule, oral, Daily  ceftaroline, 200 mg, intravenous, q8h  DAPTOmycin, 700 mg, intravenous, q48h  epoetin tyson or biosimilar, 100 Units/kg, subcutaneous, Every Mon/Wed/Fri  heparin, " 3,100 Units, intra-catheter, After Dialysis  heparin, 3,100 Units, intra-catheter, After Dialysis  insulin glargine, 2 Units, subcutaneous, Nightly  insulin lispro, 0-5 Units, subcutaneous, TID  levothyroxine, 125 mcg, oral, Daily before breakfast  lidocaine, 1 patch, transdermal, Daily  midodrine, 5 mg, oral, Before Dialysis  pantoprazole, 40 mg, intravenous, BID  polyethylene glycol, 17 g, oral, Daily  sennosides, 8.6 mg, oral, BID  sevelamer carbonate, 2,400 mg, oral, TID  [Held by provider] sodium zirconium cyclosilicate, 10 g, oral, Daily      Continuous medications  oxygen, , Last Rate: 2 L/min (05/03/24 1758)  oxygen, , Last Rate: 2 L/min (05/24/24 1420)      PRN medications  PRN medications: cyclobenzaprine, dextrose, dextrose, fentaNYL PF, glucagon, heparin, lidocaine, lidocaine PF, loperamide, midazolam, nitroglycerin, ondansetron, oxyCODONE, oxygen, oxygen, oxygen, oxygen, simethicone    Results for orders placed or performed during the hospital encounter of 04/30/24 (from the past 24 hour(s))   POCT GLUCOSE   Result Value Ref Range    POCT Glucose 198 (H) 74 - 99 mg/dL   POCT GLUCOSE   Result Value Ref Range    POCT Glucose 134 (H) 74 - 99 mg/dL   POCT GLUCOSE   Result Value Ref Range    POCT Glucose 100 (H) 74 - 99 mg/dL   POCT GLUCOSE   Result Value Ref Range    POCT Glucose 157 (H) 74 - 99 mg/dL      IR CVC tunneled    Result Date: 5/24/2024  Interpreted By:  Ronaldo Dempsey, STUDY: IR CVC TUNNELED; ;  5/24/2024 2:56 pm   ULTRASOUND AND FLUOROSCOPICALLY GUIDED RIGHT COMMON FEMORAL VEIN TUNNELED HEMODIALYSIS CATHETER   INDICATION: Signs/Symptoms:Permcath placement. 39-year-old man with end-stage renal disease, chronic superior thoracic venous occlusion, clearance of bacteremia requires replacement of tunneled hemodialysis catheter.   COMPARISON: Ultrasound images from temporary catheter placement 05/13/2024, images from prior tunneled catheter exchange 05/03/2024, fluoroscopic images from prior  tunnel catheter placement 01/22/2024, venography 01/19/2024   ACCESSION NUMBER(S): [id number]   ORDERING CLINICIAN: GREGORIA REGALADO   TECHNIQUE:   ATTENDING : Ronaldo Dempsey M.D.   TECHNICAL DESCRIPTION/FINDINGS: The procedure, including all risks, benefits and alternatives were explained to the patient in detail. All questions were answered and written informed consent was obtained.   The patient was positioned supine on the fluoroscopy table. A time-out was performed.   The bilateral groins were prepped and draped in usual sterile fashion. 1% lidocaine was administered subcutaneously for local anesthesia. Using combination of fluoroscopic landmarks and real-time ultrasound guidance, the right common femoral vein was accessed in antegrade direction using micropuncture technique. Ultrasound images were saved. Under fluoroscopic visualization, an 018 guidewire was advanced into the inferior vena cava and a micropuncture transitional introducer was placed.   Through the micropuncture transitional introducer, an 035 stiff Glidewire was used to navigate known caval and inferior cavoatrial stenosis. The wire was coiled in the right atrium.   Additional 1% lidocaine was then administered subcutaneously for local anesthesia to anesthetize the subcutaneous tunnel tract. The dual lumen cuffed hemodialysis catheter was then tunneled from a right anterolateral proximal right thigh incision site and externalized overlying the venotomy. After serial dilation, a 15 Nauruan peel-away sheath was placed. Through the sheath over the 035 stiff Glidewire, the 14.5 Nauruan by 42 cm tip to cuff dual lumen hemodialysis catheter was then placed, and after sheath removal was further advanced over the Glidewire into the right atrium superior to the known inferior cavoatrial stenosis. The guidewire was removed. A completion fluoroscopic image demonstrates the catheter tip projecting over the inferior aspect of the right atrium.    Catheter lumens easily aspirated and flushed were locked with a concentrated heparinized solution (1000 units/cc). The catheter hub was sutured to the skin with 2 0 Prolene stay suture. Sterile dressing was applied.   SEDATION/MEDICATIONS: Continuous cardiopulmonary monitoring was performed by a radiology nurse for the duration of the procedure.  1 mg Versed and   50 mcg Fentanyl were administered for moderate conscious sedation time of 25 minutes.      15 cc 1% Lidocaine was administered subcutaneously for local anesthesia. SPECIMENS: None. ESTIMATED BLOOD LOSS:  5 cc FLOUROSCOPY:  1.6 minutes; DAP  7941 mGy-cm*2; Air Kerma  23.60 mGy CONTRAST: None.   FINDINGS: Test       Ultrasound and fluoroscopically guided right common femoral vein tunneled hemodialysis catheter. The catheter is ready for immediate use.     MACRO: None   Signed by: Ronaldo Dempsey 5/24/2024 10:21 PM Dictation workstation:   [id number]    IR CVC removal    Result Date: 5/24/2024  These images are not reportable by radiology and will not be interpreted by  Radiologists.    Electrocardiogram, 12-lead PRN ACS symptoms    Result Date: 5/20/2024  Atrial fibrillation with rapid ventricular response with premature ventricular or aberrantly conducted complexes Nonspecific ST and T wave abnormality , probably digitalis effect Abnormal ECG When compared with ECG of 16-JAN-2024 07:06, Atrial fibrillation has replaced Sinus rhythm ST elevation now present in Inferior leads Nonspecific T wave abnormality, worse in Lateral leads Confirmed by Brandon Nur ([id number]) on 5/20/2024 7:36:36 AM    IR CVC removal    Result Date: 5/16/2024  These images are not reportable by radiology and will not be interpreted by  Radiologists.    Transesophageal Echo (POLI)    Result Date: 5/14/2024   [facility], [address] [place] [address]              Tel [phone] and Fax [phone] TRANSESOPHAGEAL ECHOCARDIOGRAM REPORT  Patient Name:       XIOMARA Maya Physician:    [id number] Edward                                                              David  Study Date:        5/14/2024           Ordering Provider:    [id number] ALKA VIEYRA MRN/PID:           [id number]            Fellow: Accession#:        [id number]        Nurse:                Ronaldo Woo RN Date of Birth/Age: 1985 / 39      Sonographer:          Alejandro Carroll RDCS                    years Gender:            M                   Additional Staff:     Kayden Matthews CRNA Height:            175.26 cm           Admit Date:           4/30/2024 Weight:            72.58 kg            Admission Status:     Inpatient -                                                              Routine BSA / BMI:         1.88 m2 / 23.63     Encounter#:           [id number]                    kg/m2                                        Department Location:  [facility] Non                                                              Invasive Blood Pressure: 116 /69 mmHg Study Type:    TRANSESOPHAGEAL ECHO (POLI) Diagnosis/ICD: Bacteremia-R78.81 Indication:    Staphyococcus aureus bacteremia, R/o endocarditis CPT Code:      POLI Complete-89313; Doppler Limited-64517; Color Doppler-57570 Patient History: Allergies:         Cashew nuts. Smoker:            Unknown. Diabetes:          Yes Insulin Pertinent History: HTN, Hyperlipidemia and PVD. 39 y.o. male presents for POLI                    for endocarditis rule out.                     PMH of ESRD and SIRS. Study Detail: The following Echo studies were performed: 2D. Agitated saline               used as a contrast agent for intraseptal flow evaluation. Total               contrast used for this procedure was 10 mL via IV push. Patient's               heart rhythm is sinus tachycardia. The patient was sedated.  PHYSICIAN INTERPRETATION: POLI Details: The POLI probe used was 5177.  Technically adequate omniplane transesophageal echocardiogram performed. POLI Medication: The pharynx was anesthetized with Cetacaine spray and viscous lidocaine. The patient was sedated by Anesthesia; please refer to anesthesia flow sheet for medications used. POLI Procedure: The probe was passed without difficulty. Left Ventricle: The left ventricular systolic function is mildly decreased, with an estimated ejection fraction of 35-40%. Wall motion is abnormal. The left ventricular cavity size was not assessed. Left ventricular diastolic filling was not assessed. Left Atrium: The left atrium is normal in size. There is no definite left atrial thrombus present. A bubble study using agitated saline was performed. Bubble study is negative. There is a normal sized left atrial appendage. Right Ventricle: The right ventricle is normal in size. There is normal right ventricular global systolic function. Right Atrium: The right atrium is normal in size. Aortic Valve: There is no visualized aortic valve vegetation. The aortic valve appears structurally normal. There is no evidence of aortic valve regurgitation. Mitral Valve: The mitral valve is normal in structure. There was no mitral valve vegetation visualized. There is trace mitral valve regurgitation. Tricuspid Valve: No vegetation is seen on the tricuspid valve. The tricuspid valve is structurally normal. There is trace tricuspid regurgitation. Pulmonic Valve: No pulmonic valve vegetation visualized. The pulmonic valve is structurally normal. There is no indication of pulmonic valve regurgitation. Pericardium: There is no pericardial effusion noted. Aorta: The aortic root is normal.  CONCLUSIONS:  1. Left ventricular systolic function is mildly decreased with a 35-40% estimated ejection fraction.  2. No mitral valve vegetation visualized.  3. No tricuspid valve vegetation.  4. No aortic valve vegetation visualized.  5. No pulmonic valve vegetation visualized.  6. No left  atrial thrombus. QUANTITATIVE DATA SUMMARY:  [id number] Edward Hernandez DO Electronically signed on 5/14/2024 at 7:06:59 PM  ** Final **     IR CVC tunneled    Result Date: 5/13/2024  Interpreted By:  Ronaldo Dempsey, STUDY: IR CVC TUNNELED; ;  5/13/2024 3:26 pm   ULTRASOUND FLUOROSCOPICALLY GUIDED RIGHT GROIN TEMPORARY HEMODIALYSIS CATHETER PLACEMENT   INDICATION: Signs/Symptoms:Temporary dialysis line today. 39-year-old man with end-stage renal disease, persistent bacteremia despite removal of groin tunneled hemodialysis catheter. Requires replacement of hemodialysis access.   COMPARISON: Fluoroscopic images from prior tunneled hemodialysis catheter exchange 05/13/2024   ACCESSION NUMBER(S): [id number]   ORDERING CLINICIAN: BRADEN QUIROGA   TECHNIQUE:   ATTENDING : Ronaldo Dempsey M.D.   TECHNICAL DESCRIPTION/FINDINGS: The procedure, including all risks, benefits and alternatives were explained to the patient in detail. All questions were answered and written informed consent was obtained.   Patient was positioned supine on the fluoroscopy table. A time-out was performed.   The bilateral groins were prepped and draped in usual sterile fashion. Focused ultrasound was performed over the superior aspect of the right common femoral vein demonstrating superior patency with partial compressibility. Ultrasound images were saved. 1% lidocaine was administered subcutaneously for local anesthesia. Under real-time ultrasound guidance, the right common femoral vein was accessed in antegrade direction using micropuncture technique. Ultrasound images were saved. Under fluoroscopic visualization, an 018 guidewire was advanced into the right common iliac vein and micropuncture transitional introducer was utilized for placement of an 035 guidewire into the IVC. After serial dilation, a 13 Romansh by 20 cm Trialysis catheter was then placed. A completion fluoroscopic image was obtained demonstrating the tip of the  temporary hemodialysis catheter projecting over the IV C.   Catheter lumens easily aspirated and flushed. Large-bore dialysis lumens were locked with a concentrated heparinized solution (1000 units/cc). A small bore 3rd lumen was locked with a dilute heparinized solution. The catheter hub was sutured to the skin with 2 0 Prolene stay suture. Sterile dressing was applied.   SEDATION/MEDICATIONS: Continuous cardiopulmonary monitoring was performed by a radiology nurse for the duration of the procedure. 0.5 mg Versed and   25 mcg Fentanyl were administered for moderate conscious sedation time of 20 minutes.      10 cc 1% Lidocaine was administered subcutaneously for local anesthesia. SPECIMENS: None. ESTIMATED BLOOD LOSS:  5 cc FLOUROSCOPY:  0.1 minutes; DAP  2405 mGy-cm*2; Air Kerma  6.58 mGy CONTRAST: None.   FINDINGS: Test       Ultrasound fluoroscopically guided right groin temporary hemodialysis catheter. The catheter is ready for immediate use.     MACRO: None   Signed by: Ronaldo Dempsey 5/13/2024 4:59 PM Dictation workstation:   [id number]    MR lumbar spine wo IV contrast    Result Date: 5/4/2024  Interpreted By:  Eva gO,  Heather Hernandez STUDY: MR LUMBAR SPINE WO IV CONTRAST   INDICATION: Signs/Symptoms:back pain new with bacteremia, possible infection   COMPARISON: CT of the lumbar spine 04/30/2024. Body CT 01/14/2024.   ACCESSION NUMBER(S): [id number]   ORDERING CLINICIAN: ALKA VIEYRA   TECHNIQUE: Sagittal T1, sagittal T2, sagittal STIR, axial T1 and axial T2 weighted MRI images of the lumbar spine were obtained without intravenous contrast administration.   FINDINGS: Image quality is somewhat degraded due to motion and technique.   There are 5 non-rib-bearing lumbar-type vertebral bodies. The last well-formed intervertebral disc is labeled L5-S1.   Minimal retrolisthesis at L5-S1. Alignment is otherwise normal.   Vertebral body heights are maintained. Disc spaces are relatively  maintained.   There is diffusely hypointense bone marrow signal on the T1 weighted images which may be related to renal osteodystrophy. Differential considerations include anemia, reactive marrow or bone marrow infiltrating process.   There is no increased STIR signal to suggest marrow edema. Multilevel degenerative endplate changes include prominent Schmorl's nodes most pronounced at L3-4 where there are Modic type 2 changes and central intradiscal T1 and T2 hypointense signal likely degenerative.   The conus medullaris terminates at L1-2. The visualized spinal cord, conus medullaris and cauda equina demonstrate normal signal and morphology.   Fatty atrophic changes involve the paravertebral musculature. Partially visualized kidneys demonstrate atrophic changes with multiple bilateral cysts, better characterized on previous body CT dated 01/14/2024. Partially visualized catheter within the inferior vena cava and right common iliac vein.   T10-11 on sagittal images only: No spinal canal or neural foraminal stenosis.   T11-12 on sagittal images only: No spinal canal or neural foraminal stenosis.   T12-L1: Facet arthropathy and ligamentum flavum thickening. No spinal canal or neural foraminal stenosis.   L1-2: Facet arthropathy and ligamentum flavum thickening. No spinal canal or neural foraminal stenosis.   L2-3: Facet arthropathy and ligamentum flavum thickening. No spinal canal or neural foraminal stenosis.   L3-4: Disc bulge, facet arthropathy and ligamentum flavum thickening. No spinal canal or neural foraminal stenosis.   L4-5: Disc bulge with small central protrusion, facet arthropathy and ligamentum flavum thickening. No spinal canal stenosis. Mild bilateral neural foraminal stenosis.   L5-S1: Disc bulge, facet arthropathy and ligamentum flavum thickening. No spinal canal stenosis. Mild right and no left neural foraminal stenosis.   Degenerative changes are noted at the sacroiliac joints.       Diffusely  hypointense bone marrow signal on the T1 weighted images may be related to renal osteodystrophy. Differential considerations include anemia, reactive bone marrow or bone marrow infiltrating process.   No definite imaging findings to suggest discitis osteomyelitis on the current exam.   Degenerative changes without significant spinal canal stenosis. Varying degrees of mild neural foraminal narrowing as detailed above.   I personally reviewed the images/study and I agree with the findings as stated by Dr. Lowe.   MACRO: None   Signed by: Eva Og 5/4/2024 11:11 AM Dictation workstation:   [id number]    IR CVC exchange    Result Date: 5/3/2024  Interpreted By:  Ronaldo Dempsey, STUDY: IR CVC EXCHANGE; ;  5/3/2024 6:28 pm   FLUOROSCOPICALLY GUIDED EXCHANGE OF RIGHT GROIN TUNNELED HEMODIALYSIS CATHETER   INDICATION: Signs/Symptoms:Bacteremia- Right fem TDC exchange. 39-year-old man with end-stage renal disease, central thoracic venous occlusion, end-stage vascular access with bacteremia. Guidewire exchange of right groin tunneled hemodialysis catheter in an attempt to salvage the access.   COMPARISON: Chest radiograph 04/30/2024, fluoroscopic images from prior tunneled hemodialysis catheter placement 01/22/2024   ACCESSION NUMBER(S): [id number]   ORDERING CLINICIAN: SACHI MORTENESN   TECHNIQUE:   ATTENDING : Ronaldo Dempsey M.D.   TECHNICAL DESCRIPTION/FINDINGS: The procedure, including all risks, benefits and alternatives were explained to the patient in detail. All questions were answered and written informed consent was obtained.   The patient was positioned supine on the angiography table. A time-out was performed.   The right groin was prepped and draped in usual sterile fashion. A  fluoroscopic image was obtained demonstrating the tunneled hemodialysis catheter terminating at the inferior cavoatrial junction.   1% lidocaine was administered via the subcutaneous tunnel tract for  local anesthesia. Blunt dissection was performed to free the subcutaneous catheter cuff. An 035 stiff Glidewire was then advanced through the catheter into the right atrium. The old catheter was removed. Over the guidewire, a new 14.5 Luxembourgish by 42 cm tip to cuff dual-lumen hemodialysis catheter was then placed. A completion fluoroscopic image demonstrates the catheter in the inferior right atrium.   Catheter lumens easily aspirated and flushed and were locked with a concentrated heparinized solution (1000 units/cc). The catheter hub was then sutured to the skin with 2 0 Prolene stay suture. Because of losing along the tract, Gel-Foam pledgets were then administered subcutaneously near where the catheter enters the skin for additional hemostasis, followed by a pursestring suture. A sterile dressing was applied.   SEDATION/MEDICATIONS: Continuous cardiopulmonary monitoring was performed by a radiology nurse for the duration of the procedure.  1 mg Versed and   50 mcg Fentanyl were administered for moderate conscious sedation time of 20 minutes.      10 cc 1% Lidocaine was administered subcutaneously for local anesthesia. SPECIMENS: None. ESTIMATED BLOOD LOSS:  5 cc FLOUROSCOPY:  1.2 minutes; DAP  20004 mGy-cm*2; Air Kerma  59.07 mGy CONTRAST: None.   FINDINGS: Test       Fluoroscopically guidewire exchange of right groin tunneled hemodialysis catheter. The newly exchanged catheter is ready for immediate use.     MACRO: None   Signed by: Ronaldo Dempsey 5/3/2024 8:09 PM Dictation workstation:   [id number]    Transthoracic Echo Complete    Result Date: 5/3/2024   [facility], [address] [place] [address]              Tel [phone] and Fax [phone] TRANSTHORACIC ECHOCARDIOGRAM REPORT  Patient Name:      XIOMARA Maya Physician:    [id number] Brandon Nur DO Study Date:        5/3/2024             Ordering Provider:     [id number] ALKA VIEYRA MRN/PID:           [id number]             Fellow: Accession#:        [id number]         Nurse: Date of Birth/Age: 1985 / 39 years Sonographer:          Sy Lyons RDCS Gender:            M                    Additional Staff: Height:            175.00 cm            Admit Date: Weight:            65.80 kg             Admission Status:     Inpatient -                                                               Routine BSA / BMI:         1.80 m2 / 21.49      Encounter#:           [id number]                    kg/m2                                         Department Location:  [facility] Non                                                               Invasive Blood Pressure: 112 /74 mmHg Study Type:    TRANSTHORACIC ECHO (TTE) COMPLETE Diagnosis/ICD: Endocarditis, valve unspecified-I38 Indication:    endocarditis CPT Code:      Echo Complete w Full Doppler-40938 Patient History: Pertinent History: HTN and Hyperlipidemia. Study Detail: The following Echo studies were performed: M-Mode, Doppler, color               flow and 2D.  PHYSICIAN INTERPRETATION: Left Ventricle: The left ventricular systolic function is moderately decreased, with an estimated ejection fraction of 35-40%. There are no regional wall motion abnormalities. The left ventricular cavity size is normal. Abnormal (paradoxical) septal motion, consistent with left bundle branch block. Spectral Doppler shows an impaired relaxation pattern of left ventricular diastolic filling. Left Atrium: The left atrium is normal in size. Right Ventricle: The right ventricle is normal in size. There is normal right ventricular global systolic function. Right Atrium: The right atrium is normal in size. Aortic Valve: The aortic valve is probably trileaflet. There is no evidence of aortic valve regurgitation. Mitral Valve: The mitral valve is mildly thickened. There is trace mitral valve regurgitation. Tricuspid Valve: The tricuspid valve is  structurally normal. No evidence of tricuspid regurgitation. Pulmonic Valve: The pulmonic valve is structurally normal. There is no indication of pulmonic valve regurgitation. Pericardium: There is no pericardial effusion noted. Aorta: The aortic root is normal. In comparison to the previous echocardiogram(s): Compared with study from 1/16/2024, LV function has declined. Was previously normal.  CONCLUSIONS:  1. Left ventricular systolic function is moderately decreased with a 35-40% estimated ejection fraction.  2. Abnormal septal motion consistent with left bundle branch block.  3. Spectral Doppler shows an impaired relaxation pattern of left ventricular diastolic filling.  4. No vegetations visualized within the limitations of this study.  5. Compared with study from 1/16/2024, LV function has declined. Was previously normal. QUANTITATIVE DATA SUMMARY: LA VOLUME:                               Normal Ranges: LA Vol A4C:        40.1 ml    (22+/-6mL/m2) LA Vol A2C:        35.3 ml LA Vol BP:         40.9 ml LA Vol Index A4C:  22.3ml/m2 LA Vol Index A2C:  19.6 ml/m2 LA Vol Index BP:   22.7 ml/m2 LA Area A4C:       15.3 cm2 LA Area A2C:       13.2 cm2 LA Major Axis A4C: 5.0 cm LA Major Axis A2C: 4.2 cm LA Volume Index:   22.7 ml/m2  [id number] Brandon Nur DO Electronically signed on 5/3/2024 at 4:24:04 PM  ** Final **     Lower extremity venous duplex right    Result Date: 5/1/2024             [place] [address]   Tel [phone] and Fax [phone]  Vascular Lab Report [facility] LOWER EXTREMITY VENOUS DUPLEX RIGHT  Patient Name:      XIOMARA Maya Physician:  [id number] Rayo Jauregui MD, RPVI Study Date:        4/30/2024            Ordering Physician: [id number] FARMER MRN/PID:           [id number]             Technologist:       Megan Shay RVCARIN Accession#:        [id number]         Technologist 2: Date of  Birth/Age: 1985 / 39 years Encounter#:         [id number] Gender:            M Admission Status:  Emergency            Location Performed: [facility]  Diagnosis/ICD: Pain in right leg-M79.604 CPT Codes:     46738 Peripheral venous duplex scan for DVT Limited  **CRITICAL RESULT** Critical Result: Acute DVT in the right leg. Notification called to Deep Paniagua PA-C on 4/30/2024 at 10:52:21 AM by Megan Shay RDMS, RVT.  CONCLUSIONS: Right Lower Venous: There is acute non-occlusive deep vein thrombosis visualized in the common femoral vein. There are chronic changes visualized in the popliteal vein. Enlarged lymph node noted in the right groin. Waveforms suggestive of more distal obstruction/thrombus. May wish further means of imaging evaluation. Left Lower Venous: There are chronic changes visualized in the common femoral vein.  Comparison: Compared with study from 7/27/2023, New finding of acute non-occlusive thrombus in the right CFV.  Imaging & Doppler Findings:  Right                 Compressible      Thrombus          Flow Distal External Iliac                     None CFV                     Partial    Acute non-occlusive Continuous PFV                       Yes             None FV Proximal               Yes             None         Continuous FV Mid                    Yes             None FV Distal                 Yes             None Popliteal                 Yes            Chronic       Continuous Peroneal                  Yes             None PTV                       Yes             None  Left Compress Thrombus        Flow CFV    Yes    Chronic  Spontaneous/Phasic  [id number] Rayo Jauregui MD, RPBRIAN Electronically signed by [id number] Rayo Jauregui MD, RPBRIAN on 5/1/2024 at 9:13:07 AM  ** Final **     XR chest 1 view    Result Date: 4/30/2024  STUDY: Chest Radiograph;  4/30/2024 at 2:42 PM. INDICATION: Fever. COMPARISON: XR chest 1/14/2024, 11/10/2023; CTA chest 2/15/2023. ACCESSION NUMBER(S): [id number]  ORDERING CLINICIAN: WENDY INFANTE TECHNIQUE:  Frontal chest was obtained at 14:42 hours. FINDINGS: CARDIOMEDIASTINAL SILHOUETTE: Cardiomediastinal silhouette is normal in size and configuration.  LUNGS: Patchy right basilar infiltrate or atelectasis with small right pleural effusion. Left basilar scar versus atelectasis. ABDOMEN: No remarkable upper abdominal findings.  BONES: No acute osseous changes.    Patchy right basilar infiltrate or atelectasis with small right pleural effusion. Signed by Joseph Overton MD    CT thoracic spine wo IV contrast    Result Date: 4/30/2024  STUDY: CT Thoracic Spine and Lumbar Spine without IV Contrast; 4/30/2024 11:11 AM INDICATION: Midline back pain. COMPARISON: None Available. ACCESSION NUMBER(S): [id number], [id number] ORDERING CLINICIAN: RAJNI FARMER TECHNIQUE:  CT of the thoracic spine and lumbar spine was performed without intravenous or intrathecal contrast.  Sagittal and coronal reconstructions were generated.  Automated mA/kV exposure control was utilized and patient examination was performed in strict accordance with principles of ALARA. FINDINGS: Portions of the T1 vertebral body, as well as a small portion of the anterior superior aspect of T2 is cut off on this study.  No compression deformities are visualized within the thoracic vertebral bodies from T2 through T12.  No spondylolisthesis is noted.  No pedicular defects are noted.  No prominent edema is appreciated within the adjacent posterior paravertebral musculature.  Evaluation of the thecal sac is limited due to lack of myelographic contrast.  There are enlarged lymph nodes seen within the visualized portions of the mediastinum.  There are bilateral lower lobe infiltrates with tiny bilateral effusions.  Coronary artery calcifications are present. No compression deformities are visualized within the lumbar spine.  No pars defects are noted.  No significant spondylolisthesis is seen.  No prominent edema is  appreciated within the visualized portions of the psoas musculature.  A long dialysis catheter is visualized within the inferior vena cava.  Evaluation of the thecal sac is limited due to lack of myelographic contrast.  There is disc bulging seen from L3 through S1.  No significant impingement is appreciated upon the neural foramina on the sagittal reconstructions.    Thoracic spine: 1.  Portions of T1 and T2 cough on this study. 2.  No acute osseous findings seen from T3 through T12. 3.  Tiny bilateral effusions with adjacent lower lobe infiltrates. 4.  The distal adenopathy. 5.  Coronary artery calcifications. Lumbar spine: 1.  No compression deformities or spinal listhesis is noted. 2.  Disc bulging from L3 to S1.  Evaluation for spinal stenosis is limited due to lack mammographic contrast. 3.  Incidental note of a long dialysis catheter within the inferior vena cava. Signed by Deejay Hoffman MD    CT lumbar spine wo IV contrast    Result Date: 4/30/2024  STUDY: CT Thoracic Spine and Lumbar Spine without IV Contrast; 4/30/2024 11:11 AM INDICATION: Midline back pain. COMPARISON: None Available. ACCESSION NUMBER(S): [id number], [id number] ORDERING CLINICIAN: RAJNI FARMER TECHNIQUE:  CT of the thoracic spine and lumbar spine was performed without intravenous or intrathecal contrast.  Sagittal and coronal reconstructions were generated.  Automated mA/kV exposure control was utilized and patient examination was performed in strict accordance with principles of ALARA. FINDINGS: Portions of the T1 vertebral body, as well as a small portion of the anterior superior aspect of T2 is cut off on this study.  No compression deformities are visualized within the thoracic vertebral bodies from T2 through T12.  No spondylolisthesis is noted.  No pedicular defects are noted.  No prominent edema is appreciated within the adjacent posterior paravertebral musculature.  Evaluation of the thecal sac is limited due to lack of  myelographic contrast.  There are enlarged lymph nodes seen within the visualized portions of the mediastinum.  There are bilateral lower lobe infiltrates with tiny bilateral effusions.  Coronary artery calcifications are present. No compression deformities are visualized within the lumbar spine.  No pars defects are noted.  No significant spondylolisthesis is seen.  No prominent edema is appreciated within the visualized portions of the psoas musculature.  A long dialysis catheter is visualized within the inferior vena cava.  Evaluation of the thecal sac is limited due to lack of myelographic contrast.  There is disc bulging seen from L3 through S1.  No significant impingement is appreciated upon the neural foramina on the sagittal reconstructions.    Thoracic spine: 1.  Portions of T1 and T2 cough on this study. 2.  No acute osseous findings seen from T3 through T12. 3.  Tiny bilateral effusions with adjacent lower lobe infiltrates. 4.  The distal adenopathy. 5.  Coronary artery calcifications. Lumbar spine: 1.  No compression deformities or spinal listhesis is noted. 2.  Disc bulging from L3 to S1.  Evaluation for spinal stenosis is limited due to lack mammographic contrast. 3.  Incidental note of a long dialysis catheter within the inferior vena cava. Signed by Deejay Hoffman MD    XR pelvis 1-2 views    Result Date: 4/30/2024  STUDY: Pelvis Radiographs; 4/30/2024 at 9:06 AM. INDICATION: Pain. COMPARISON: CT pelvis 7/17/2023. ACCESSION NUMBER(S): [id number] ORDERING CLINICIAN: RAJNI FARMER TECHNIQUE:  One view(s) of the pelvis. FINDINGS:  The pelvic ring is intact.  There is no acute fracture.  Severe underlying osteopenia.    Slightly limited secondary to underlying osteopenia.  No acute osseous abnormality. Signed by Roe Ruiz MD      Assessment/Plan   Principal Problem:    SIRS (systemic inflammatory response syndrome) (Multi)    IMPRESSION  DIABETIC KETOACIDOSIS, RESOLVED  TYPE 2 DIABETES  MELLITUS  LONG TERM CURRENT INSULIN USE       RECOMMENDATIONS  To continue Lantus 2 units subcutaneous bedtime  Continue insulin correction scale TID AC   Diabetic diet as tolerated   Accu-Cheks ACHS    Hypoglycemic protocol   Will continue to follow     Gibson Fortune MD

## 2024-05-26 NOTE — CARE PLAN
Problem: Pain  Goal: My pain/discomfort is manageable  Outcome: Progressing     Problem: Safety  Goal: Patient will be injury free during hospitalization  Outcome: Progressing     Problem: Daily Care  Goal: Daily care needs are met  Outcome: Progressing     Problem: Psychosocial Needs  Goal: Demonstrates ability to cope with hospitalization/illness  Outcome: Progressing     Problem: Skin  Goal: Participates in plan/prevention/treatment measures  Outcome: Progressing   The patient's goals for the shift include      The clinical goals for the shift include free from falls by end of shift

## 2024-05-26 NOTE — PROGRESS NOTES
Edwar Hemphill is a 39 y.o. male on day 26 of admission presenting with SIRS (systemic inflammatory response syndrome) (Multi).      Subjective   Overall doing better no new complaints       Objective        Vitals 24HR  Heart Rate:  [80-88]   Temp:  [36.2 °C (97.2 °F)-36.9 °C (98.4 °F)]   Resp:  [18]   BP: (104)/(65)   SpO2:  [92 %]     Intake/Output last 3 Shifts:    Intake/Output Summary (Last 24 hours) at 5/26/2024 1334  Last data filed at 5/26/2024 0900  Gross per 24 hour   Intake 1320 ml   Output 1400 ml   Net -80 ml       Physical Exam      Appearance: Awake alert no distress  Head and ENT; normocephalic/atraumatic/upper neck/no JVD  Lungs; clear to auscultation  Heart: RRR  Abdomen; soft no tenderness  Extremities right upper extremity amputation  Right femoral tunneled dialysis catheter noted.          Relevant Results     Results from last 7 days   Lab Units 05/21/24  1129 05/20/24  1126   WBC AUTO x10*3/uL 5.0 5.0   HEMOGLOBIN g/dL 7.2* 7.7*   HEMATOCRIT % 23.6* 25.3*   PLATELETS AUTO x10*3/uL 151 149*      Results from last 7 days   Lab Units 05/25/24  0612 05/24/24  0651 05/23/24  0611   SODIUM mmol/L 132* 131* 134*   POTASSIUM mmol/L 4.5 4.2 4.1   CHLORIDE mmol/L 96* 95* 97*   CO2 mmol/L 17* 23 26   BUN mg/dL 17 22 19   CREATININE mg/dL 5.17* 7.14* 6.27*   GLUCOSE mg/dL 209* 226* 48*   CALCIUM mg/dL 8.7 8.1* 7.9*        Current Facility-Administered Medications:     [Held by provider] apixaban (Eliquis) tablet 5 mg, 5 mg, oral, BID, SOBALDO Wilson, DNP, 5 mg at 05/03/24 2156    B complex-vitamin C-folic acid (Nephrocaps) capsule 1 capsule, 1 capsule, oral, Daily, AMARILYS Wilson-CNP, DNP, 1 capsule at 05/26/24 0839    ceftaroline (Teflaro) 200 mg in dextrose 5 % in water (D5W) 50 mL IV, 200 mg, intravenous, q8h, Sheldon Saleh MD, Stopped at 05/26/24 0655    cyclobenzaprine (Flexeril) tablet 5 mg, 5 mg, oral, TID PRN, El Estrada MD, 5 mg at 05/21/24 7962    DAPTOmycin (Cubicin)  700 mg/100 mL  mg, 700 mg, intravenous, q48h, Sheldon Saleh MD, Stopped at 05/24/24 2342    dextrose 50 % injection 12.5 g, 12.5 g, intravenous, q15 min PRN, Veronica De La Paz MD, 12.5 g at 05/23/24 0822    dextrose 50 % injection 25 g, 25 g, intravenous, q15 min PRN, Mike Santana MD, 25 g at 05/22/24 0955    epoetin tyson-epbx (Retacrit) injection 8,000 Units, 100 Units/kg, subcutaneous, Every Mon/Wed/Fri, Eber Bruce DO, 8,000 Units at 05/24/24 2313    fentaNYL PF (Sublimaze) injection, , , PRN, Ronaldo Dempsey MD, 50 mcg at 05/24/24 1431    glucagon (Glucagen) injection 1 mg, 1 mg, intramuscular, q15 min PRN, Veronica De La Paz MD    heparin 1,000 unit/mL injection 3,100 Units, 3,100 Units, intra-catheter, After Dialysis, OSBALDO Wilson, DNP, 3,100 Units at 05/25/24 1802    heparin 1,000 unit/mL injection 3,100 Units, 3,100 Units, intra-catheter, After Dialysis, OSBALDO Wilson DNP, 3,100 Units at 05/25/24 1858    heparin 1,000 unit/mL injection, , , PRN, Ronaldo Dempsey MD, 1,800 Units at 05/13/24 2329    insulin glargine (Lantus) injection 2 Units, 2 Units, subcutaneous, Nightly, Gibson Fortune MD, 2 Units at 05/25/24 2251    insulin lispro (HumaLOG) injection 0-5 Units, 0-5 Units, subcutaneous, TID, Veronica De La Paz MD, 1 Units at 05/25/24 1336    levothyroxine (Synthroid, Levoxyl) tablet 125 mcg, 125 mcg, oral, Daily before breakfast, OSBALDO Wilson, DNP, 125 mcg at 05/26/24 0744    lidocaine (Xylocaine) 20 mg/mL (2 %) injection, , , PRN, Ronaldo Dempsey MD, 20 mL at 05/24/24 1430    lidocaine 4 % patch 1 patch, 1 patch, transdermal, Daily, Michelle Drummond PA-C, 1 patch at 05/26/24 0839    lidocaine PF (Xylocaine) 10 mg/mL (1 %) injection, , , PRN, Ronaldo Dempsey MD, 5 mL at 05/03/24 1816    loperamide (Imodium) capsule 2 mg, 2 mg, oral, q4h PRN, Andi Bazan, APRN-CNP, DNP    midazolam (Versed) injection, , , PRN,  Ronaldo Dempsey MD, 1 mg at 05/24/24 1428    midodrine (Proamatine) tablet 5 mg, 5 mg, oral, Before Dialysis, Eber Bruce, , 5 mg at 05/25/24 1510    nitroglycerin (Nitrostat) SL tablet 0.4 mg, 0.4 mg, sublingual, q5 min PRN, OSBALDO Wilson, DNP    ondansetron (Zofran) injection 4 mg, 4 mg, intravenous, q6h PRN, OSBALDO Wilson, DNP    oxyCODONE (Roxicodone) immediate release tablet 5 mg, 5 mg, oral, q4h PRN, OSBALDO Wilson, DNP, 5 mg at 05/23/24 0855    oxygen (O2) therapy, , , Continuous PRN, Ronaldo Dempsey MD, Last Rate: 120,000 mL/hr at 05/03/24 1758, 2 L/min at 05/03/24 1758    oxygen (O2) therapy, , inhalation, Continuous PRN - O2/gases, OSBALDO Rousseau    oxygen (O2) therapy, , inhalation, Continuous PRN - O2/gases, OSBALDO Rousseau    oxygen (O2) therapy, , , Continuous PRN, Ronaldo Dempsey MD, Last Rate: 120,000 mL/hr at 05/24/24 1420, 2 L/min at 05/24/24 1420    pantoprazole (ProtoNix) injection 40 mg, 40 mg, intravenous, BID, OSBALDO Marin, 40 mg at 05/26/24 0850    polyethylene glycol (Glycolax, Miralax) packet 17 g, 17 g, oral, Daily, AMARILYS Wilson-CNP, DNP, 17 g at 05/22/24 1014    sennosides (Senokot) tablet 8.6 mg, 8.6 mg, oral, BID, AMARILYS Wilson-CNP, DNP, 8.6 mg at 05/25/24 2100    sevelamer carbonate (Renvela) tablet 2,400 mg, 2,400 mg, oral, TID, OSBALDO Wilson, DNP, 2,400 mg at 05/25/24 1733    simethicone (Mylicon) chewable tablet 80 mg, 80 mg, oral, q8h PRN, OSBALDO Wilson, DNP    [Held by provider] sodium zirconium cyclosilicate (Lokelma) packet 10 g, 10 g, oral, Daily, Eber Bruce DO, 10 g at 05/23/24 0839           Assessment/Plan        1.  ESKD on hemodialysis, right femoral tunneled dialysis catheter recently placed in IR  Hemodialysis will be scheduled for tomorrow Monday  2.  Diabetes mellitus continue current management  3.   Hypertension, continue current management   4.  Status post multiple admissions for sepsis and removal of dialysis catheters  5.  SIRS will follow all the consultants infectious disease         Principal Problem:    SIRS (systemic inflammatory response syndrome) (Multi)       I spent 35 minutes in the professional and overall care of this patient.      Gracie Hollins MD

## 2024-05-28 ENCOUNTER — APPOINTMENT (OUTPATIENT)
Dept: CARDIOLOGY | Facility: HOSPITAL | Age: 39
DRG: 205 | End: 2024-05-28
Payer: COMMERCIAL

## 2024-05-28 ENCOUNTER — APPOINTMENT (OUTPATIENT)
Dept: RADIOLOGY | Facility: HOSPITAL | Age: 39
DRG: 205 | End: 2024-05-28
Payer: COMMERCIAL

## 2024-05-28 ENCOUNTER — HOSPITAL ENCOUNTER (INPATIENT)
Facility: HOSPITAL | Age: 39
LOS: 4 days | Discharge: NURSING, REHAB OR LONG-TERM CARE | DRG: 205 | End: 2024-06-01
Attending: STUDENT IN AN ORGANIZED HEALTH CARE EDUCATION/TRAINING PROGRAM | Admitting: SPECIALIST
Payer: COMMERCIAL

## 2024-05-28 DIAGNOSIS — R09.02 HYPOXIA: ICD-10-CM

## 2024-05-28 DIAGNOSIS — N18.6 ESRD (END STAGE RENAL DISEASE) (MULTI): ICD-10-CM

## 2024-05-28 DIAGNOSIS — J18.9 PNEUMONIA OF BOTH LUNGS DUE TO INFECTIOUS ORGANISM, UNSPECIFIED PART OF LUNG: Primary | ICD-10-CM

## 2024-05-28 LAB
ALBUMIN SERPL BCP-MCNC: 2.5 G/DL (ref 3.4–5)
ALP SERPL-CCNC: 124 U/L (ref 33–120)
ALT SERPL W P-5'-P-CCNC: 5 U/L (ref 10–52)
ANION GAP SERPL CALC-SCNC: 19 MMOL/L (ref 10–20)
AST SERPL W P-5'-P-CCNC: 10 U/L (ref 9–39)
ATRIAL RATE: 84 BPM
BASOPHILS # BLD AUTO: 0.16 X10*3/UL (ref 0–0.1)
BASOPHILS NFR BLD AUTO: 1.6 %
BILIRUB SERPL-MCNC: 0.8 MG/DL (ref 0–1.2)
BUN SERPL-MCNC: 19 MG/DL (ref 6–23)
CALCIUM SERPL-MCNC: 8.8 MG/DL (ref 8.6–10.3)
CARDIAC TROPONIN I PNL SERPL HS: 18 NG/L (ref 0–20)
CHLORIDE SERPL-SCNC: 98 MMOL/L (ref 98–107)
CO2 SERPL-SCNC: 23 MMOL/L (ref 21–32)
CREAT SERPL-MCNC: 6.01 MG/DL (ref 0.5–1.3)
EGFRCR SERPLBLD CKD-EPI 2021: 11 ML/MIN/1.73M*2
EOSINOPHIL # BLD AUTO: 1.25 X10*3/UL (ref 0–0.7)
EOSINOPHIL NFR BLD AUTO: 12.9 %
ERYTHROCYTE [DISTWIDTH] IN BLOOD BY AUTOMATED COUNT: 18.1 % (ref 11.5–14.5)
GLUCOSE BLD MANUAL STRIP-MCNC: 154 MG/DL (ref 74–99)
GLUCOSE BLD MANUAL STRIP-MCNC: 191 MG/DL (ref 74–99)
GLUCOSE BLD MANUAL STRIP-MCNC: 203 MG/DL (ref 74–99)
GLUCOSE BLD MANUAL STRIP-MCNC: 213 MG/DL (ref 74–99)
GLUCOSE SERPL-MCNC: 146 MG/DL (ref 74–99)
HCT VFR BLD AUTO: 27.1 % (ref 41–52)
HGB BLD-MCNC: 8.4 G/DL (ref 13.5–17.5)
IMM GRANULOCYTES # BLD AUTO: 0.04 X10*3/UL (ref 0–0.7)
IMM GRANULOCYTES NFR BLD AUTO: 0.4 % (ref 0–0.9)
LACTATE SERPL-SCNC: 1.3 MMOL/L (ref 0.4–2)
LYMPHOCYTES # BLD AUTO: 1.06 X10*3/UL (ref 1.2–4.8)
LYMPHOCYTES NFR BLD AUTO: 10.9 %
MAGNESIUM SERPL-MCNC: 2 MG/DL (ref 1.6–2.4)
MCH RBC QN AUTO: 27.5 PG (ref 26–34)
MCHC RBC AUTO-ENTMCNC: 31 G/DL (ref 32–36)
MCV RBC AUTO: 89 FL (ref 80–100)
MONOCYTES # BLD AUTO: 1.46 X10*3/UL (ref 0.1–1)
MONOCYTES NFR BLD AUTO: 15 %
NEUTROPHILS # BLD AUTO: 5.74 X10*3/UL (ref 1.2–7.7)
NEUTROPHILS NFR BLD AUTO: 59.2 %
NRBC BLD-RTO: 0 /100 WBCS (ref 0–0)
P AXIS: 28 DEGREES
P OFFSET: 201 MS
P ONSET: 147 MS
PLATELET # BLD AUTO: 235 X10*3/UL (ref 150–450)
POTASSIUM SERPL-SCNC: 4.3 MMOL/L (ref 3.5–5.3)
PR INTERVAL: 154 MS
PROT SERPL-MCNC: 6.8 G/DL (ref 6.4–8.2)
Q ONSET: 224 MS
QRS COUNT: 14 BEATS
QRS DURATION: 76 MS
QT INTERVAL: 394 MS
QTC CALCULATION(BAZETT): 465 MS
QTC FREDERICIA: 440 MS
R AXIS: 16 DEGREES
RBC # BLD AUTO: 3.06 X10*6/UL (ref 4.5–5.9)
SARS-COV-2 RNA RESP QL NAA+PROBE: NOT DETECTED
SODIUM SERPL-SCNC: 136 MMOL/L (ref 136–145)
T AXIS: 134 DEGREES
T OFFSET: 421 MS
VENTRICULAR RATE: 84 BPM
WBC # BLD AUTO: 9.7 X10*3/UL (ref 4.4–11.3)

## 2024-05-28 PROCEDURE — 83735 ASSAY OF MAGNESIUM: CPT | Performed by: PHYSICIAN ASSISTANT

## 2024-05-28 PROCEDURE — 93005 ELECTROCARDIOGRAM TRACING: CPT

## 2024-05-28 PROCEDURE — 87635 SARS-COV-2 COVID-19 AMP PRB: CPT | Performed by: PHYSICIAN ASSISTANT

## 2024-05-28 PROCEDURE — 1100000001 HC PRIVATE ROOM DAILY

## 2024-05-28 PROCEDURE — 71045 X-RAY EXAM CHEST 1 VIEW: CPT | Performed by: RADIOLOGY

## 2024-05-28 PROCEDURE — 71045 X-RAY EXAM CHEST 1 VIEW: CPT

## 2024-05-28 PROCEDURE — 84132 ASSAY OF SERUM POTASSIUM: CPT | Mod: 91 | Performed by: PHYSICIAN ASSISTANT

## 2024-05-28 PROCEDURE — 82947 ASSAY GLUCOSE BLOOD QUANT: CPT

## 2024-05-28 PROCEDURE — 2500000001 HC RX 250 WO HCPCS SELF ADMINISTERED DRUGS (ALT 637 FOR MEDICARE OP): Performed by: SPECIALIST

## 2024-05-28 PROCEDURE — 84484 ASSAY OF TROPONIN QUANT: CPT | Performed by: PHYSICIAN ASSISTANT

## 2024-05-28 PROCEDURE — 36415 COLL VENOUS BLD VENIPUNCTURE: CPT | Performed by: PHYSICIAN ASSISTANT

## 2024-05-28 PROCEDURE — 83605 ASSAY OF LACTIC ACID: CPT | Mod: 91 | Performed by: PHYSICIAN ASSISTANT

## 2024-05-28 PROCEDURE — 2500000004 HC RX 250 GENERAL PHARMACY W/ HCPCS (ALT 636 FOR OP/ED): Performed by: PHARMACIST

## 2024-05-28 PROCEDURE — 80053 COMPREHEN METABOLIC PANEL: CPT | Performed by: PHYSICIAN ASSISTANT

## 2024-05-28 PROCEDURE — 82435 ASSAY OF BLOOD CHLORIDE: CPT | Performed by: PHYSICIAN ASSISTANT

## 2024-05-28 PROCEDURE — 83605 ASSAY OF LACTIC ACID: CPT | Performed by: PHYSICIAN ASSISTANT

## 2024-05-28 PROCEDURE — 85025 COMPLETE CBC W/AUTO DIFF WBC: CPT | Performed by: PHYSICIAN ASSISTANT

## 2024-05-28 PROCEDURE — 2500000002 HC RX 250 W HCPCS SELF ADMINISTERED DRUGS (ALT 637 FOR MEDICARE OP, ALT 636 FOR OP/ED): Performed by: SPECIALIST

## 2024-05-28 PROCEDURE — 99285 EMERGENCY DEPT VISIT HI MDM: CPT

## 2024-05-28 RX ORDER — INSULIN GLARGINE 100 [IU]/ML
2 INJECTION, SOLUTION SUBCUTANEOUS NIGHTLY
Status: DISCONTINUED | OUTPATIENT
Start: 2024-05-28 | End: 2024-06-01 | Stop reason: HOSPADM

## 2024-05-28 RX ORDER — VANCOMYCIN HYDROCHLORIDE 1 G/20ML
INJECTION, POWDER, LYOPHILIZED, FOR SOLUTION INTRAVENOUS DAILY PRN
Status: DISCONTINUED | OUTPATIENT
Start: 2024-05-28 | End: 2024-06-01 | Stop reason: HOSPADM

## 2024-05-28 RX ORDER — INSULIN LISPRO 100 [IU]/ML
0-5 INJECTION, SOLUTION INTRAVENOUS; SUBCUTANEOUS
Status: DISCONTINUED | OUTPATIENT
Start: 2024-05-28 | End: 2024-06-01 | Stop reason: HOSPADM

## 2024-05-28 RX ORDER — DEXTROSE 50 % IN WATER (D50W) INTRAVENOUS SYRINGE
25
Status: DISCONTINUED | OUTPATIENT
Start: 2024-05-28 | End: 2024-06-01 | Stop reason: HOSPADM

## 2024-05-28 RX ORDER — ACETAMINOPHEN 325 MG/1
650 TABLET ORAL EVERY 4 HOURS PRN
Status: DISCONTINUED | OUTPATIENT
Start: 2024-05-28 | End: 2024-06-01 | Stop reason: HOSPADM

## 2024-05-28 RX ORDER — SEVELAMER CARBONATE 800 MG/1
2400 TABLET, FILM COATED ORAL
Status: DISCONTINUED | OUTPATIENT
Start: 2024-05-28 | End: 2024-06-01 | Stop reason: HOSPADM

## 2024-05-28 RX ORDER — MIDODRINE HYDROCHLORIDE 5 MG/1
5 TABLET ORAL
Status: DISCONTINUED | OUTPATIENT
Start: 2024-05-29 | End: 2024-05-29

## 2024-05-28 RX ORDER — LINEZOLID 600 MG/1
600 TABLET, FILM COATED ORAL 2 TIMES DAILY
Status: DISCONTINUED | OUTPATIENT
Start: 2024-05-28 | End: 2024-06-01 | Stop reason: HOSPADM

## 2024-05-28 RX ORDER — OXYCODONE AND ACETAMINOPHEN 5; 325 MG/1; MG/1
1 TABLET ORAL EVERY 6 HOURS PRN
Status: DISCONTINUED | OUTPATIENT
Start: 2024-05-28 | End: 2024-06-01 | Stop reason: HOSPADM

## 2024-05-28 RX ORDER — LEVOTHYROXINE SODIUM 125 UG/1
125 TABLET ORAL
Status: DISCONTINUED | OUTPATIENT
Start: 2024-05-29 | End: 2024-06-01 | Stop reason: HOSPADM

## 2024-05-28 RX ORDER — SIMETHICONE 80 MG
80 TABLET,CHEWABLE ORAL EVERY 8 HOURS PRN
Status: DISCONTINUED | OUTPATIENT
Start: 2024-05-28 | End: 2024-06-01 | Stop reason: HOSPADM

## 2024-05-28 RX ORDER — LOPERAMIDE HYDROCHLORIDE 2 MG/1
4 CAPSULE ORAL EVERY 2 HOUR PRN
Status: DISCONTINUED | OUTPATIENT
Start: 2024-05-28 | End: 2024-06-01 | Stop reason: HOSPADM

## 2024-05-28 RX ORDER — DEXTROSE 50 % IN WATER (D50W) INTRAVENOUS SYRINGE
12.5
Status: DISCONTINUED | OUTPATIENT
Start: 2024-05-28 | End: 2024-06-01 | Stop reason: HOSPADM

## 2024-05-28 RX ORDER — PANTOPRAZOLE SODIUM 40 MG/1
40 TABLET, DELAYED RELEASE ORAL
Status: DISCONTINUED | OUTPATIENT
Start: 2024-05-29 | End: 2024-06-01 | Stop reason: HOSPADM

## 2024-05-28 RX ORDER — NITROGLYCERIN 0.4 MG/1
0.4 TABLET SUBLINGUAL EVERY 5 MIN PRN
Status: DISCONTINUED | OUTPATIENT
Start: 2024-05-28 | End: 2024-06-01 | Stop reason: HOSPADM

## 2024-05-28 RX ORDER — DAPTOMYCIN IN SODIUM CHLORIDE 700 MG/100ML
6 INJECTION, SOLUTION INTRAVENOUS
Status: DISCONTINUED | OUTPATIENT
Start: 2024-05-28 | End: 2024-05-28

## 2024-05-28 RX ADMIN — SEVELAMER CARBONATE 2400 MG: 800 TABLET, FILM COATED ORAL at 17:12

## 2024-05-28 RX ADMIN — INSULIN GLARGINE 2 UNITS: 100 INJECTION, SOLUTION SUBCUTANEOUS at 20:33

## 2024-05-28 RX ADMIN — VANCOMYCIN HYDROCHLORIDE 1500 MG: 1.5 INJECTION, POWDER, LYOPHILIZED, FOR SOLUTION INTRAVENOUS at 15:39

## 2024-05-28 RX ADMIN — INSULIN LISPRO 1 UNITS: 100 INJECTION, SOLUTION INTRAVENOUS; SUBCUTANEOUS at 17:12

## 2024-05-28 SDOH — SOCIAL STABILITY: SOCIAL INSECURITY: HAS ANYONE EVER THREATENED TO HURT YOUR FAMILY OR YOUR PETS?: NO

## 2024-05-28 SDOH — SOCIAL STABILITY: SOCIAL INSECURITY: ARE YOU OR HAVE YOU BEEN THREATENED OR ABUSED PHYSICALLY, EMOTIONALLY, OR SEXUALLY BY ANYONE?: NO

## 2024-05-28 SDOH — SOCIAL STABILITY: SOCIAL INSECURITY: HAVE YOU HAD THOUGHTS OF HARMING ANYONE ELSE?: NO

## 2024-05-28 SDOH — SOCIAL STABILITY: SOCIAL INSECURITY: ARE THERE ANY APPARENT SIGNS OF INJURIES/BEHAVIORS THAT COULD BE RELATED TO ABUSE/NEGLECT?: NO

## 2024-05-28 SDOH — SOCIAL STABILITY: SOCIAL INSECURITY: WERE YOU ABLE TO COMPLETE ALL THE BEHAVIORAL HEALTH SCREENINGS?: YES

## 2024-05-28 SDOH — SOCIAL STABILITY: SOCIAL INSECURITY: DO YOU FEEL ANYONE HAS EXPLOITED OR TAKEN ADVANTAGE OF YOU FINANCIALLY OR OF YOUR PERSONAL PROPERTY?: NO

## 2024-05-28 SDOH — SOCIAL STABILITY: SOCIAL INSECURITY: DO YOU FEEL UNSAFE GOING BACK TO THE PLACE WHERE YOU ARE LIVING?: NO

## 2024-05-28 SDOH — SOCIAL STABILITY: SOCIAL INSECURITY: DOES ANYONE TRY TO KEEP YOU FROM HAVING/CONTACTING OTHER FRIENDS OR DOING THINGS OUTSIDE YOUR HOME?: NO

## 2024-05-28 SDOH — SOCIAL STABILITY: SOCIAL INSECURITY: HAVE YOU HAD ANY THOUGHTS OF HARMING ANYONE ELSE?: NO

## 2024-05-28 SDOH — SOCIAL STABILITY: SOCIAL INSECURITY: ABUSE: ADULT

## 2024-05-28 ASSESSMENT — COGNITIVE AND FUNCTIONAL STATUS - GENERAL
MOBILITY SCORE: 12
TURNING FROM BACK TO SIDE WHILE IN FLAT BAD: A LOT
PERSONAL GROOMING: A LOT
DRESSING REGULAR LOWER BODY CLOTHING: A LOT
MOVING TO AND FROM BED TO CHAIR: TOTAL
WALKING IN HOSPITAL ROOM: TOTAL
HELP NEEDED FOR BATHING: A LOT
TOILETING: A LOT
TURNING FROM BACK TO SIDE WHILE IN FLAT BAD: A LOT
TURNING FROM BACK TO SIDE WHILE IN FLAT BAD: A LOT
EATING MEALS: A LOT
STANDING UP FROM CHAIR USING ARMS: TOTAL
MOBILITY SCORE: 8
PATIENT BASELINE BEDBOUND: YES
CLIMB 3 TO 5 STEPS WITH RAILING: A LOT
TOILETING: A LOT
HELP NEEDED FOR BATHING: A LOT
CLIMB 3 TO 5 STEPS WITH RAILING: TOTAL
STANDING UP FROM CHAIR USING ARMS: A LOT
CLIMB 3 TO 5 STEPS WITH RAILING: TOTAL
HELP NEEDED FOR BATHING: A LOT
PERSONAL GROOMING: A LOT
MOVING TO AND FROM BED TO CHAIR: A LOT
PERSONAL GROOMING: A LOT
DRESSING REGULAR LOWER BODY CLOTHING: A LOT
CLIMB 3 TO 5 STEPS WITH RAILING: TOTAL
EATING MEALS: A LITTLE
MOVING FROM LYING ON BACK TO SITTING ON SIDE OF FLAT BED WITH BEDRAILS: A LOT
MOVING FROM LYING ON BACK TO SITTING ON SIDE OF FLAT BED WITH BEDRAILS: A LOT
EATING MEALS: A LOT
MOVING FROM LYING ON BACK TO SITTING ON SIDE OF FLAT BED WITH BEDRAILS: A LOT
MOBILITY SCORE: 10
WALKING IN HOSPITAL ROOM: TOTAL
MOVING TO AND FROM BED TO CHAIR: A LOT
DRESSING REGULAR LOWER BODY CLOTHING: A LOT
TURNING FROM BACK TO SIDE WHILE IN FLAT BAD: A LOT
STANDING UP FROM CHAIR USING ARMS: A LOT
WALKING IN HOSPITAL ROOM: TOTAL
EATING MEALS: A LITTLE
TOILETING: A LOT
MOVING TO AND FROM BED TO CHAIR: TOTAL
DAILY ACTIVITIY SCORE: 13
STANDING UP FROM CHAIR USING ARMS: TOTAL
DRESSING REGULAR UPPER BODY CLOTHING: A LOT
MOVING FROM LYING ON BACK TO SITTING ON SIDE OF FLAT BED WITH BEDRAILS: A LOT
PERSONAL GROOMING: A LOT
HELP NEEDED FOR BATHING: A LOT
DAILY ACTIVITIY SCORE: 13
WALKING IN HOSPITAL ROOM: A LOT
DAILY ACTIVITIY SCORE: 12
DRESSING REGULAR UPPER BODY CLOTHING: A LOT
DRESSING REGULAR LOWER BODY CLOTHING: A LOT
DRESSING REGULAR UPPER BODY CLOTHING: A LOT
TOILETING: A LOT
MOBILITY SCORE: 8

## 2024-05-28 ASSESSMENT — ACTIVITIES OF DAILY LIVING (ADL)
TOILETING: NEEDS ASSISTANCE
GROOMING: DEPENDENT
DRESSING YOURSELF: DEPENDENT
PATIENT'S MEMORY ADEQUATE TO SAFELY COMPLETE DAILY ACTIVITIES?: YES
LACK_OF_TRANSPORTATION: NO
WALKS IN HOME: DEPENDENT
FEEDING YOURSELF: NEEDS ASSISTANCE
TOILETING: NEEDS ASSISTANCE
HEARING - RIGHT EAR: FUNCTIONAL
DRESSING YOURSELF: NEEDS ASSISTANCE
HEARING - LEFT EAR: FUNCTIONAL
BATHING: NEEDS ASSISTANCE
WALKS IN HOME: DEPENDENT
ASSISTIVE_DEVICE: WHEELCHAIR
GROOMING: NEEDS ASSISTANCE
PATIENT'S MEMORY ADEQUATE TO SAFELY COMPLETE DAILY ACTIVITIES?: YES
FEEDING YOURSELF: NEEDS ASSISTANCE
HEARING - RIGHT EAR: FUNCTIONAL
ADEQUATE_TO_COMPLETE_ADL: YES
JUDGMENT_ADEQUATE_SAFELY_COMPLETE_DAILY_ACTIVITIES: YES
ADEQUATE_TO_COMPLETE_ADL: YES
ASSISTIVE_DEVICE: WHEELCHAIR
BATHING: NEEDS ASSISTANCE
HEARING - LEFT EAR: FUNCTIONAL
JUDGMENT_ADEQUATE_SAFELY_COMPLETE_DAILY_ACTIVITIES: YES

## 2024-05-28 ASSESSMENT — PAIN SCALES - GENERAL
PAINLEVEL_OUTOF10: 0 - NO PAIN
PAINLEVEL_OUTOF10: 0 - NO PAIN

## 2024-05-28 ASSESSMENT — PAIN - FUNCTIONAL ASSESSMENT: PAIN_FUNCTIONAL_ASSESSMENT: 0-10

## 2024-05-28 ASSESSMENT — LIFESTYLE VARIABLES
PRESCIPTION_ABUSE_PAST_12_MONTHS: NO
HOW MANY STANDARD DRINKS CONTAINING ALCOHOL DO YOU HAVE ON A TYPICAL DAY: PATIENT DOES NOT DRINK
AUDIT-C TOTAL SCORE: 0
HOW OFTEN DO YOU HAVE A DRINK CONTAINING ALCOHOL: NEVER
AUDIT-C TOTAL SCORE: 0
SKIP TO QUESTIONS 9-10: 1
HOW OFTEN DO YOU HAVE 6 OR MORE DRINKS ON ONE OCCASION: NEVER
SUBSTANCE_ABUSE_PAST_12_MONTHS: NO

## 2024-05-28 ASSESSMENT — COLUMBIA-SUICIDE SEVERITY RATING SCALE - C-SSRS
2. HAVE YOU ACTUALLY HAD ANY THOUGHTS OF KILLING YOURSELF?: NO
1. IN THE PAST MONTH, HAVE YOU WISHED YOU WERE DEAD OR WISHED YOU COULD GO TO SLEEP AND NOT WAKE UP?: NO
6. HAVE YOU EVER DONE ANYTHING, STARTED TO DO ANYTHING, OR PREPARED TO DO ANYTHING TO END YOUR LIFE?: NO

## 2024-05-28 ASSESSMENT — PAIN SCALES - WONG BAKER: WONGBAKER_NUMERICALRESPONSE: NO HURT

## 2024-05-28 ASSESSMENT — PATIENT HEALTH QUESTIONNAIRE - PHQ9
2. FEELING DOWN, DEPRESSED OR HOPELESS: SEVERAL DAYS
SUM OF ALL RESPONSES TO PHQ9 QUESTIONS 1 & 2: 2
1. LITTLE INTEREST OR PLEASURE IN DOING THINGS: SEVERAL DAYS

## 2024-05-28 NOTE — CONSULTS
Reason For Consult  ESKD on hemodialysis known to your group.    History Of Present Illness  Edwar Hemphill is a 39 y.o. male presenting with hypoxia, shortness of breath, and hypertension with dizziness from  [facility].  Patient known to us was just discharged 2 days ago with ESKD on hemodialysis on Monday/Wednesday/Friday via right femoral TDC.  No history of fever or chills reported by the patient.  Placed on oxygen via nasal cannula and given midodrine for hypotension.  Past Medical History  He has a past medical history of Chronic kidney disease, Diabetes mellitus (Multi), ESRD (end stage renal disease) (Multi), Essential (primary) hypertension (05/26/2017), GERD (gastroesophageal reflux disease), Hyperlipidemia, and Hypothyroidism.    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     Social History  He reports that he has been smoking cigarettes. He has a 3.8 pack-year smoking history. He has never used smokeless tobacco. No history on file for alcohol use and drug use.    Family History  Family History   Problem Relation Name Age of Onset    Other (DIABETES DUE TO UNDERLYING CONDITION WITH MICROALBUMINURIA) Father      Other (DIABETES DUE TO UNDERLYING CONDITION WITH MICROALBUMINURIA [Other]) Other AUNT     Other (DIABETES DUE TO UNDERLYING CONDITION WITH MICROALBUMINURIA [Other]) Other GP         Allergies  Cashew nut    Review of Systems  All systems were reviewed     Physical Exam     Appearance: Awake alert no  "distress  Head and ENT; normocephalic/atraumatic/upper neck/no JVD  Lungs; bilateral lower lung crackles and rhonchi  Heart: Irregular/irregular rhythm  Abdomen; soft no tenderness  Extremities right upper extremity amputation  Right femoral tunneled dialysis catheter noted.                   I&O 24HR  No intake or output data in the 24 hours ending 05/28/24 1156    Vitals 24HR  Heart Rate:  [82-89]   Temp:  [36.1 °C (97 °F)-36.7 °C (98 °F)]   Resp:  [16-18]   BP: (102-122)/(70-81)   Height:  [175.3 cm (5' 9\")]   Weight:  [68.5 kg (151 lb)]   SpO2:  [96 %-98 %]         Relevant Results  Results from last 7 days   Lab Units 05/28/24  0416 05/25/24  0612 05/24/24  0651   SODIUM mmol/L 136 132* 131*   POTASSIUM mmol/L 4.3 4.5 4.2   CHLORIDE mmol/L 98 96* 95*   CO2 mmol/L 23 17* 23   BUN mg/dL 19 17 22   CREATININE mg/dL 6.01* 5.17* 7.14*   GLUCOSE mg/dL 146* 209* 226*   CALCIUM mg/dL 8.8 8.7 8.1*      Results from last 7 days   Lab Units 05/28/24  0416   WBC AUTO x10*3/uL 9.7   HEMOGLOBIN g/dL 8.4*   HEMATOCRIT % 27.1*   PLATELETS AUTO x10*3/uL 235    ECG 12 Lead    Result Date: 5/28/2024  Normal sinus rhythm Nonspecific T wave abnormality Prolonged QT Abnormal ECG When compared with ECG of 04-MAY-2024 10:11, Sinus rhythm has replaced Atrial fibrillation Vent. rate has decreased BY  59 BPM ST no longer elevated in Inferior leads Nonspecific T wave abnormality no longer evident in Inferior leads Nonspecific T wave abnormality now evident in Anterior leads See ED provider note for full interpretation and clinical correlation Confirmed by Essie Wright ([id number]) on 5/28/2024 11:11:48 AM    XR chest 1 view    Result Date: 5/28/2024  Interpreted By:  Gigi Acevedo, STUDY: XR CHEST 1 VIEW;  5/28/2024 3:58 am   INDICATION: Signs/Symptoms: Hypoxia.   COMPARISON: 04/30/2024, 03/31/2024   ACCESSION NUMBER(S): [id number]   ORDERING CLINICIAN: ADAL STEWART   FINDINGS:     Normal heart size. Similar small bilateral pleural " effusions. New patchy left mid lung and right suprahilar airspace opacities when compared to 04/30/2024. Partially visualized lower extremity approach central venous catheter with the tip projected over the right atrium. Mild diffuse interstitial prominence. No pneumothorax. Normal heart size. Severe osteopenia. No acute osseous abnormality.       1. New patchy left mid lung and right suprahilar airspace opacities when compared to 04/30/2024. Consider pneumonia. 2. Mild diffuse interstitial prominence, likely interstitial edema. 3. Similar small bilateral pleural effusions.   Signed by: Gigi Acevedo 5/28/2024 4:04 AM Dictation workstation:   [id number]    IR CVC tunneled    Result Date: 5/24/2024  Interpreted By:  Ronaldo Dempsey, STUDY: IR CVC TUNNELED; ;  5/24/2024 2:56 pm   ULTRASOUND AND FLUOROSCOPICALLY GUIDED RIGHT COMMON FEMORAL VEIN TUNNELED HEMODIALYSIS CATHETER   INDICATION: Signs/Symptoms:Permcath placement. 39-year-old man with end-stage renal disease, chronic superior thoracic venous occlusion, clearance of bacteremia requires replacement of tunneled hemodialysis catheter.   COMPARISON: Ultrasound images from temporary catheter placement 05/13/2024, images from prior tunneled catheter exchange 05/03/2024, fluoroscopic images from prior tunnel catheter placement 01/22/2024, venography 01/19/2024   ACCESSION NUMBER(S): [id number]   ORDERING CLINICIAN: GREGORIA REGALADO   TECHNIQUE:   ATTENDING : Ronaldo Dempsey M.D.   TECHNICAL DESCRIPTION/FINDINGS: The procedure, including all risks, benefits and alternatives were explained to the patient in detail. All questions were answered and written informed consent was obtained.   The patient was positioned supine on the fluoroscopy table. A time-out was performed.   The bilateral groins were prepped and draped in usual sterile fashion. 1% lidocaine was administered subcutaneously for local anesthesia. Using combination of fluoroscopic  landmarks and real-time ultrasound guidance, the right common femoral vein was accessed in antegrade direction using micropuncture technique. Ultrasound images were saved. Under fluoroscopic visualization, an 018 guidewire was advanced into the inferior vena cava and a micropuncture transitional introducer was placed.   Through the micropuncture transitional introducer, an 035 stiff Glidewire was used to navigate known caval and inferior cavoatrial stenosis. The wire was coiled in the right atrium.   Additional 1% lidocaine was then administered subcutaneously for local anesthesia to anesthetize the subcutaneous tunnel tract. The dual lumen cuffed hemodialysis catheter was then tunneled from a right anterolateral proximal right thigh incision site and externalized overlying the venotomy. After serial dilation, a 15 Cameroonian peel-away sheath was placed. Through the sheath over the 035 stiff Glidewire, the 14.5 Cam[place]onian by 42 cm tip to cuff dual lumen hemodialysis catheter was then placed, and after sheath removal was further advanced over the Glidewire into the right atrium superior to the known inferior cavoatrial stenosis. The guidewire was removed. A completion fluoroscopic image demonstrates the catheter tip projecting over the inferior aspect of the right atrium.   Catheter lumens easily aspirated and flushed were locked with a concentrated heparinized solution (1000 units/cc). The catheter hub was sutured to the skin with 2 0 Prolene stay suture. Sterile dressing was applied.   SEDATION/MEDICATIONS: Continuous cardiopulmonary monitoring was performed by a radiology nurse for the duration of the procedure.  1 mg Versed and   50 mcg Fentanyl were administered for moderate conscious sedation time of 25 minutes.      15 cc 1% Lidocaine was administered subcutaneously for local anesthesia. SPECIMENS: None. ESTIMATED BLOOD LOSS:  5 cc FLOUROSCOPY:  1.6 minutes; DAP  7941 mGy-cm*2; Air Kerma  23.60 mGy CONTRAST: None.    FINDINGS: Test       Ultrasound and fluoroscopically guided right common femoral vein tunneled hemodialysis catheter. The catheter is ready for immediate use.     MACRO: None   Signed by: Ronaldo Dempsey 5/24/2024 10:21 PM Dictation workstation:   [id number]    IR CVC removal    Result Date: 5/24/2024  These images are not reportable by radiology and will not be interpreted by  Radiologists.    Electrocardiogram, 12-lead PRN ACS symptoms    Result Date: 5/20/2024  Atrial fibrillation with rapid ventricular response with premature ventricular or aberrantly conducted complexes Nonspecific ST and T wave abnormality , probably digitalis effect Abnormal ECG When compared with ECG of 16-JAN-2024 07:06, Atrial fibrillation has replaced Sinus rhythm ST elevation now present in Inferior leads Nonspecific T wave abnormality, worse in Lateral leads Confirmed by Brandon Nur ([id number]) on 5/20/2024 7:36:36 AM    IR CVC removal    Result Date: 5/16/2024  These images are not reportable by radiology and will not be interpreted by  Radiologists.    Transesophageal Echo (POLI)    Result Date: 5/14/2024   [facility], [address] [place] [address]              Tel [phone] and Fax [phone] TRANSESOPHAGEAL ECHOCARDIOGRAM REPORT  Patient Name:      XIOMARA Maya Physician:    [id number] Hernandez DO Study Date:        5/14/2024           Ordering Provider:    [id number] VIEYRA MRN/PID:           [id number]            Fellow: Accession#:        [id number]        Nurse:                Ronaldo Woo RN Date of Birth/Age: 1985 / 39      Sonographer:          Alejandro Carroll RDCS                    years Gender:            M                   Additional Staff:     Kayden Matthews CRNA Height:            175.26 cm           Admit Date:           4/30/2024 Weight:             72.58 kg            Admission Status:     Inpatient -                                                              Routine BSA / BMI:         1.88 m2 / 23.63     Encounter#:           [id number]                    kg/m2                                        Department Location:  [facility]                                                              Invasive Blood Pressure: 116 /69 mmHg Study Type:    TRANSESOPHAGEAL ECHO (POLI) Diagnosis/ICD: Bacteremia-R78.81 Indication:    Staphyococcus aureus bacteremia, R/o endocarditis CPT Code:      POLI Complete-77162; Doppler Limited-90422; Color Doppler-86940 Patient History: Allergies:         Cashew nuts. Smoker:            Unknown. Diabetes:          Yes Insulin Pertinent History: HTN, Hyperlipidemia and PVD. 39 y.o. male presents for POLI                    for endocarditis rule out.                     PMH of ESRD and SIRS. Study Detail: The following Echo studies were performed: 2D. Agitated saline               used as a contrast agent for intraseptal flow evaluation. Total               contrast used for this procedure was 10 mL via IV push. Patient's               heart rhythm is sinus tachycardia. The patient was sedated.  PHYSICIAN INTERPRETATION: POLI Details: The POLI probe used was 5177. Technically adequate omniplane transesophageal echocardiogram performed. POLI Medication: The pharynx was anesthetized with Cetacaine spray and viscous lidocaine. The patient was sedated by Anesthesia; please refer to anesthesia flow sheet for medications used. POLI Procedure: The probe was passed without difficulty. Left Ventricle: The left ventricular systolic function is mildly decreased, with an estimated ejection fraction of 35-40%. Wall motion is abnormal. The left ventricular cavity size was not assessed. Left ventricular diastolic filling was not assessed. Left Atrium: The left atrium is normal in size. There is no definite left atrial thrombus present. A  bubble study using agitated saline was performed. Bubble study is negative. There is a normal sized left atrial appendage. Right Ventricle: The right ventricle is normal in size. There is normal right ventricular global systolic function. Right Atrium: The right atrium is normal in size. Aortic Valve: There is no visualized aortic valve vegetation. The aortic valve appears structurally normal. There is no evidence of aortic valve regurgitation. Mitral Valve: The mitral valve is normal in structure. There was no mitral valve vegetation visualized. There is trace mitral valve regurgitation. Tricuspid Valve: No vegetation is seen on the tricuspid valve. The tricuspid valve is structurally normal. There is trace tricuspid regurgitation. Pulmonic Valve: No pulmonic valve vegetation visualized. The pulmonic valve is structurally normal. There is no indication of pulmonic valve regurgitation. Pericardium: There is no pericardial effusion noted. Aorta: The aortic root is normal.  CONCLUSIONS:  1. Left ventricular systolic function is mildly decreased with a 35-40% estimated ejection fraction.  2. No mitral valve vegetation visualized.  3. No tricuspid valve vegetation.  4. No aortic valve vegetation visualized.  5. No pulmonic valve vegetation visualized.  6. No left atrial thrombus. QUANTITATIVE DATA SUMMARY:  [id number] Edward David CASEY Electronically signed on 5/14/2024 at 7:06:59 PM  ** Final **     IR CVC tunneled    Result Date: 5/13/2024  Interpreted By:  Ronaldo Dempsey, STUDY: IR CVC TUNNELED; ;  5/13/2024 3:26 pm   ULTRASOUND FLUOROSCOPICALLY GUIDED RIGHT GROIN TEMPORARY HEMODIALYSIS CATHETER PLACEMENT   INDICATION: Signs/Symptoms:Temporary dialysis line today. 39-year-old man with end-stage renal disease, persistent bacteremia despite removal of groin tunneled hemodialysis catheter. Requires replacement of hemodialysis access.   COMPARISON: Fluoroscopic images from prior tunneled hemodialysis catheter exchange  05/13/2024   ACCESSION NUMBER(S): [id number]   ORDERING CLINICIAN: BRADEN QUIROGA   TECHNIQUE:   ATTENDING : Ronaldo Dempsey M.D.   TECHNICAL DESCRIPTION/FINDINGS: The procedure, including all risks, benefits and alternatives were explained to the patient in detail. All questions were answered and written informed consent was obtained.   Patient was positioned supine on the fluoroscopy table. A time-out was performed.   The bilateral groins were prepped and draped in usual sterile fashion. Focused ultrasound was performed over the superior aspect of the right common femoral vein demonstrating superior patency with partial compressibility. Ultrasound images were saved. 1% lidocaine was administered subcutaneously for local anesthesia. Under real-time ultrasound guidance, the right common femoral vein was accessed in antegrade direction using micropuncture technique. Ultrasound images were saved. Under fluoroscopic visualization, an 018 guidewire was advanced into the right common iliac vein and micropuncture transitional introducer was utilized for placement of an 035 guidewire into the IVC. After serial dilation, a 13 Khmer by 20 cm Trialysis catheter was then placed. A completion fluoroscopic image was obtained demonstrating the tip of the temporary hemodialysis catheter projecting over the IV C.   Catheter lumens easily aspirated and flushed. Large-bore dialysis lumens were locked with a concentrated heparinized solution (1000 units/cc). A small bore 3rd lumen was locked with a dilute heparinized solution. The catheter hub was sutured to the skin with 2 0 Prolene stay suture. Sterile dressing was applied.   SEDATION/MEDICATIONS: Continuous cardiopulmonary monitoring was performed by a radiology nurse for the duration of the procedure. 0.5 mg Versed and   25 mcg Fentanyl were administered for moderate conscious sedation time of 20 minutes.      10 cc 1% Lidocaine was administered subcutaneously for  local anesthesia. SPECIMENS: None. ESTIMATED BLOOD LOSS:  5 cc FLOUROSCOPY:  0.1 minutes; DAP  2405 mGy-cm*2; Air Kerma  6.58 mGy CONTRAST: None.   FINDINGS: Test       Ultrasound fluoroscopically guided right groin temporary hemodialysis catheter. The catheter is ready for immediate use.     MACRO: None   Signed by: Ronaldo Dempsey 5/13/2024 4:59 PM Dictation workstation:   [id number]    MR lumbar spine wo IV contrast    Result Date: 5/4/2024  Interpreted By:  Eva Og and Muddasani Dheeraj STUDY: MR LUMBAR SPINE WO IV CONTRAST   INDICATION: Signs/Symptoms:back pain new with bacteremia, possible infection   COMPARISON: CT of the lumbar spine 04/30/2024. Body CT 01/14/2024.   ACCESSION NUMBER(S): [id number]   ORDERING CLINICIAN: ALKA VIEYRA   TECHNIQUE: Sagittal T1, sagittal T2, sagittal STIR, axial T1 and axial T2 weighted MRI images of the lumbar spine were obtained without intravenous contrast administration.   FINDINGS: Image quality is somewhat degraded due to motion and technique.   There are 5 non-rib-bearing lumbar-type vertebral bodies. The last well-formed intervertebral disc is labeled L5-S1.   Minimal retrolisthesis at L5-S1. Alignment is otherwise normal.   Vertebral body heights are maintained. Disc spaces are relatively maintained.   There is diffusely hypointense bone marrow signal on the T1 weighted images which may be related to renal osteodystrophy. Differential considerations include anemia, reactive marrow or bone marrow infiltrating process.   There is no increased STIR signal to suggest marrow edema. Multilevel degenerative endplate changes include prominent Schmorl's nodes most pronounced at L3-4 where there are Modic type 2 changes and central intradiscal T1 and T2 hypointense signal likely degenerative.   The conus medullaris terminates at L1-2. The visualized spinal cord, conus medullaris and cauda equina demonstrate normal signal and morphology.   Fatty atrophic changes  involve the paravertebral musculature. Partially visualized kidneys demonstrate atrophic changes with multiple bilateral cysts, better characterized on previous body CT dated 01/14/2024. Partially visualized catheter within the inferior vena cava and right common iliac vein.   T10-11 on sagittal images only: No spinal canal or neural foraminal stenosis.   T11-12 on sagittal images only: No spinal canal or neural foraminal stenosis.   T12-L1: Facet arthropathy and ligamentum flavum thickening. No spinal canal or neural foraminal stenosis.   L1-2: Facet arthropathy and ligamentum flavum thickening. No spinal canal or neural foraminal stenosis.   L2-3: Facet arthropathy and ligamentum flavum thickening. No spinal canal or neural foraminal stenosis.   L3-4: Disc bulge, facet arthropathy and ligamentum flavum thickening. No spinal canal or neural foraminal stenosis.   L4-5: Disc bulge with small central protrusion, facet arthropathy and ligamentum flavum thickening. No spinal canal stenosis. Mild bilateral neural foraminal stenosis.   L5-S1: Disc bulge, facet arthropathy and ligamentum flavum thickening. No spinal canal stenosis. Mild right and no left neural foraminal stenosis.   Degenerative changes are noted at the sacroiliac joints.       Diffusely hypointense bone marrow signal on the T1 weighted images may be related to renal osteodystrophy. Differential considerations include anemia, reactive bone marrow or bone marrow infiltrating process.   No definite imaging findings to suggest discitis osteomyelitis on the current exam.   Degenerative changes without significant spinal canal stenosis. Varying degrees of mild neural foraminal narrowing as detailed above.   I personally reviewed the images/study and I agree with the findings as stated by Dr. Lowe.   MACRO: None   Signed by: Eva Og 5/4/2024 11:11 AM Dictation workstation:   [id number]    IR CVC exchange    Result Date: 5/3/2024  Interpreted By:   Ronaldo Dempsey, STUDY: IR CVC EXCHANGE; ;  5/3/2024 6:28 pm   FLUOROSCOPICALLY GUIDED EXCHANGE OF RIGHT GROIN TUNNELED HEMODIALYSIS CATHETER   INDICATION: Signs/Symptoms:Bacteremia- Right fem TDC exchange. 39-year-old man with end-stage renal disease, central thoracic venous occlusion, end-stage vascular access with bacteremia. Guidewire exchange of right groin tunneled hemodialysis catheter in an attempt to salvage the access.   COMPARISON: Chest radiograph 04/30/2024, fluoroscopic images from prior tunneled hemodialysis catheter placement 01/22/2024   ACCESSION NUMBER(S): [id number]   ORDERING CLINICIAN: SACHI MORTENSEN   TECHNIQUE:   ATTENDING : Ronaldo Dempsey M.D.   TECHNICAL DESCRIPTION/FINDINGS: The procedure, including all risks, benefits and alternatives were explained to the patient in detail. All questions were answered and written informed consent was obtained.   The patient was positioned supine on the angiography table. A time-out was performed.   The right groin was prepped and draped in usual sterile fashion. A  fluoroscopic image was obtained demonstrating the tunneled hemodialysis catheter terminating at the inferior cavoatrial junction.   1% lidocaine was administered via the subcutaneous tunnel tract for local anesthesia. Blunt dissection was performed to free the subcutaneous catheter cuff. An 035 stiff Glidewire was then advanced through the catheter into the right atrium. The old catheter was removed. Over the guidewire, a new 14.5 Citizen of [place] by 42 cm tip to cuff dual-lumen hemodialysis catheter was then placed. A completion fluoroscopic image demonstrates the catheter in the inferior right atrium.   Catheter lumens easily aspirated and flushed and were locked with a concentrated heparinized solution (1000 units/cc). The catheter hub was then sutured to the skin with 2 0 Prolene stay suture. Because of losing along the tract, Gel-Foam pledgets were then administered  subcutaneously near where the catheter enters the skin for additional hemostasis, followed by a pursestring suture. A sterile dressing was applied.   SEDATION/MEDICATIONS: Continuous cardiopulmonary monitoring was performed by a radiology nurse for the duration of the procedure.  1 mg Versed and   50 mcg Fentanyl were administered for moderate conscious sedation time of 20 minutes.      10 cc 1% Lidocaine was administered subcutaneously for local anesthesia. SPECIMENS: None. ESTIMATED BLOOD LOSS:  5 cc FLOUROSCOPY:  1.2 minutes; DAP  84916 mGy-cm*2; Air Kerma  59.07 mGy CONTRAST: None.   FINDINGS: Test       Fluoroscopically guidewire exchange of right groin tunneled hemodialysis catheter. The newly exchanged catheter is ready for immediate use.     MACRO: None   Signed by: Ronaldo Dempsey 5/3/2024 8:09 PM Dictation workstation:   [id number]    Transthoracic Echo Complete    Result Date: 5/3/2024   [place] [address]              Tel [phone] and Fax [phone] TRANSTHORACIC ECHOCARDIOGRAM REPORT  Patient Name:      XIOMARA Maya Physician:    [id number] Brandon Nur DO Study Date:        5/3/2024             Ordering Provider:    [id number] ALKA VIEYRA MRN/PID:           [id number]             Fellow: Accession#:        [id number]         Nurse: Date of Birth/Age: 1985 / 39 years Sonographer:          Sy Lyons RDCS Gender:            M                    Additional Staff: Height:            175.00 cm            Admit Date: Weight:            65.80 kg             Admission Status:     Inpatient -                                                               Routine BSA / BMI:         1.80 m2 / 21.49      Encounter#:           [id number]                    kg/m2                                         Department Location:  [facility], [facility]                                                                Invasive Blood Pressure: 112 /74 mmHg Study Type:    TRANSTHORACIC ECHO (TTE) COMPLETE Diagnosis/ICD: Endocarditis, valve unspecified-I38 Indication:    endocarditis CPT Code:      Echo Complete w Full Doppler-20046 Patient History: Pertinent History: HTN and Hyperlipidemia. Study Detail: The following Echo studies were performed: M-Mode, Doppler, color               flow and 2D.  PHYSICIAN INTERPRETATION: Left Ventricle: The left ventricular systolic function is moderately decreased, with an estimated ejection fraction of 35-40%. There are no regional wall motion abnormalities. The left ventricular cavity size is normal. Abnormal (paradoxical) septal motion, consistent with left bundle branch block. Spectral Doppler shows an impaired relaxation pattern of left ventricular diastolic filling. Left Atrium: The left atrium is normal in size. Right Ventricle: The right ventricle is normal in size. There is normal right ventricular global systolic function. Right Atrium: The right atrium is normal in size. Aortic Valve: The aortic valve is probably trileaflet. There is no evidence of aortic valve regurgitation. Mitral Valve: The mitral valve is mildly thickened. There is trace mitral valve regurgitation. Tricuspid Valve: The tricuspid valve is structurally normal. No evidence of tricuspid regurgitation. Pulmonic Valve: The pulmonic valve is structurally normal. There is no indication of pulmonic valve regurgitation. Pericardium: There is no pericardial effusion noted. Aorta: The aortic root is normal. In comparison to the previous echocardiogram(s): Compared with study from 1/16/2024, LV function has declined. Was previously normal.  CONCLUSIONS:  1. Left ventricular systolic function is moderately decreased with a 35-40% estimated ejection fraction.  2. Abnormal septal motion consistent with left bundle branch block.  3. Spectral Doppler shows an impaired relaxation pattern of left ventricular diastolic  filling.  4. No vegetations visualized within the limitations of this study.  5. Compared with study from 1/16/2024, LV function has declined. Was previously normal. QUANTITATIVE DATA SUMMARY: LA VOLUME:                               Normal Ranges: LA Vol A4C:        40.1 ml    (22+/-6mL/m2) LA Vol A2C:        35.3 ml LA Vol BP:         40.9 ml LA Vol Index A4C:  22.3ml/m2 LA Vol Index A2C:  19.6 ml/m2 LA Vol Index BP:   22.7 ml/m2 LA Area A4C:       15.3 cm2 LA Area A2C:       13.2 cm2 LA Major Axis A4C: 5.0 cm LA Major Axis A2C: 4.2 cm LA Volume Index:   22.7 ml/m2  [id number] Brandon Nur DO Electronically signed on 5/3/2024 at 4:24:04 PM  ** Final **     Lower extremity venous duplex right    Result Date: 5/1/2024             [place] [id number]   Tel [phone] and Fax [phone]  Vascular Lab Report [facility] US LOWER EXTREMITY VENOUS DUPLEX RIGHT  Patient Name:      XIOMARA Maya Physician:  [id number] Rayo Jauregui MD, RPVI Study Date:        4/30/2024            Ordering Physician: [id number] PANIAGUA MRN/PID:           [id number]             Technologist:       Megan Shay RVT Accession#:        [id number]         Technologist 2: Date of Birth/Age: 1985 / 39 years Encounter#:         [id number] Gender:            M Admission Status:  Emergency            Location Performed: [facility]  Diagnosis/ICD: Pain in right leg-M79.604 CPT Codes:     68448 Peripheral venous duplex scan for DVT Limited  **CRITICAL RESULT** Critical Result: Acute DVT in the right leg. Notification called to Deep Paniagua PA-C on 4/30/2024 at 10:52:21 AM by Megan Shay RDMS, RVT.  CONCLUSIONS: Right Lower Venous: There is acute non-occlusive deep vein thrombosis visualized in the common femoral vein. There are chronic changes visualized in the popliteal vein. Enlarged lymph node noted in the right groin. Waveforms  suggestive of more distal obstruction/thrombus. May wish further means of imaging evaluation. Left Lower Venous: There are chronic changes visualized in the common femoral vein.  Comparison: Compared with study from 7/27/2023, New finding of acute non-occlusive thrombus in the right CFV.  Imaging & Doppler Findings:  Right                 Compressible      Thrombus          Flow Distal External Iliac                     None CFV                     Partial    Acute non-occlusive Continuous PFV                       Yes             None FV Proximal               Yes             None         Continuous FV Mid                    Yes             None FV Distal                 Yes             None Popliteal                 Yes            Chronic       Continuous Peroneal                  Yes             None PTV                       Yes             None  Left Compress Thrombus        Flow CFV    Yes    Chronic  Spontaneous/Phasic  [id number] Rayo Jauregui MD, RPVI Electronically signed by [id number] MARIANO Call MD on 5/1/2024 at 9:13:07 AM  ** Final **     XR chest 1 view    Result Date: 4/30/2024  STUDY: Chest Radiograph;  4/30/2024 at 2:42 PM. INDICATION: Fever. COMPARISON: XR chest 1/14/2024, 11/10/2023; CTA chest 2/15/2023. ACCESSION NUMBER(S): [id number] ORDERING CLINICIAN: WENDY INFANTE TECHNIQUE:  Frontal chest was obtained at 14:42 hours. FINDINGS: CARDIOMEDIASTINAL SILHOUETTE: Cardiomediastinal silhouette is normal in size and configuration.  LUNGS: Patchy right basilar infiltrate or atelectasis with small right pleural effusion. Left basilar scar versus atelectasis. ABDOMEN: No remarkable upper abdominal findings.  BONES: No acute osseous changes.    Patchy right basilar infiltrate or atelectasis with small right pleural effusion. Signed by Joseph Overton MD    CT thoracic spine wo IV contrast    Result Date: 4/30/2024  STUDY: CT Thoracic Spine and Lumbar Spine without IV Contrast; 4/30/2024 11:11 AM INDICATION:  Midline back pain. COMPARISON: None Available. ACCESSION NUMBER(S): [id number], [id number] ORDERING CLINICIAN: RAJNI FARMER TECHNIQUE:  CT of the thoracic spine and lumbar spine was performed without intravenous or intrathecal contrast.  Sagittal and coronal reconstructions were generated.  Automated mA/kV exposure control was utilized and patient examination was performed in strict accordance with principles of ALARA. FINDINGS: Portions of the T1 vertebral body, as well as a small portion of the anterior superior aspect of T2 is cut off on this study.  No compression deformities are visualized within the thoracic vertebral bodies from T2 through T12.  No spondylolisthesis is noted.  No pedicular defects are noted.  No prominent edema is appreciated within the adjacent posterior paravertebral musculature.  Evaluation of the thecal sac is limited due to lack of myelographic contrast.  There are enlarged lymph nodes seen within the visualized portions of the mediastinum.  There are bilateral lower lobe infiltrates with tiny bilateral effusions.  Coronary artery calcifications are present. No compression deformities are visualized within the lumbar spine.  No pars defects are noted.  No significant spondylolisthesis is seen.  No prominent edema is appreciated within the visualized portions of the psoas musculature.  A long dialysis catheter is visualized within the inferior vena cava.  Evaluation of the thecal sac is limited due to lack of myelographic contrast.  There is disc bulging seen from L3 through S1.  No significant impingement is appreciated upon the neural foramina on the sagittal reconstructions.    Thoracic spine: 1.  Portions of T1 and T2 cough on this study. 2.  No acute osseous findings seen from T3 through T12. 3.  Tiny bilateral effusions with adjacent lower lobe infiltrates. 4.  The distal adenopathy. 5.  Coronary artery calcifications. Lumbar spine: 1.  No compression deformities or spinal  listhesis is noted. 2.  Disc bulging from L3 to S1.  Evaluation for spinal stenosis is limited due to lack mammographic contrast. 3.  Incidental note of a long dialysis catheter within the inferior vena cava. Signed by Deejay Hoffman MD    CT lumbar spine wo IV contrast    Result Date: 4/30/2024  STUDY: CT Thoracic Spine and Lumbar Spine without IV Contrast; 4/30/2024 11:11 AM INDICATION: Midline back pain. COMPARISON: None Available. ACCESSION NUMBER(S): [id number], [id number] ORDERING CLINICIAN: RAJNI FARMER TECHNIQUE:  CT of the thoracic spine and lumbar spine was performed without intravenous or intrathecal contrast.  Sagittal and coronal reconstructions were generated.  Automated mA/kV exposure control was utilized and patient examination was performed in strict accordance with principles of ALARA. FINDINGS: Portions of the T1 vertebral body, as well as a small portion of the anterior superior aspect of T2 is cut off on this study.  No compression deformities are visualized within the thoracic vertebral bodies from T2 through T12.  No spondylolisthesis is noted.  No pedicular defects are noted.  No prominent edema is appreciated within the adjacent posterior paravertebral musculature.  Evaluation of the thecal sac is limited due to lack of myelographic contrast.  There are enlarged lymph nodes seen within the visualized portions of the mediastinum.  There are bilateral lower lobe infiltrates with tiny bilateral effusions.  Coronary artery calcifications are present. No compression deformities are visualized within the lumbar spine.  No pars defects are noted.  No significant spondylolisthesis is seen.  No prominent edema is appreciated within the visualized portions of the psoas musculature.  A long dialysis catheter is visualized within the inferior vena cava.  Evaluation of the thecal sac is limited due to lack of myelographic contrast.  There is disc bulging seen from L3 through S1.  No significant  impingement is appreciated upon the neural foramina on the sagittal reconstructions.    Thoracic spine: 1.  Portions of T1 and T2 cough on this study. 2.  No acute osseous findings seen from T3 through T12. 3.  Tiny bilateral effusions with adjacent lower lobe infiltrates. 4.  The distal adenopathy. 5.  Coronary artery calcifications. Lumbar spine: 1.  No compression deformities or spinal listhesis is noted. 2.  Disc bulging from L3 to S1.  Evaluation for spinal stenosis is limited due to lack mammographic contrast. 3.  Incidental note of a long dialysis catheter within the inferior vena cava. Signed by Deejay Hoffman MD    XR pelvis 1-2 views    Result Date: 4/30/2024  STUDY: Pelvis Radiographs; 4/30/2024 at 9:06 AM. INDICATION: Pain. COMPARISON: CT pelvis 7/17/2023. ACCESSION NUMBER(S): [id number] ORDERING CLINICIAN: RAJNI FARMER TECHNIQUE:  One view(s) of the pelvis. FINDINGS:  The pelvic ring is intact.  There is no acute fracture.  Severe underlying osteopenia.    Slightly limited secondary to underlying osteopenia.  No acute osseous abnormality. Signed by Roe Ruiz MD       Assessment/Plan       1.  ESKD on hemodialysis, right femoral tunneled dialysis catheter recently placed in IR  Hemodialysis will be scheduled for tomorrow Wednesday.  2.  Diabetes mellitus continue current management  3.  Hypertension, continue current management   4.  Status post multiple admissions for sepsis and removal of dialysis catheters  5.  SIRS will follow all the consultants infectious disease  6.  Admitted with bilateral infiltrates probable pneumonia/CHF          Principal Problem:    Pneumonia of both lungs due to infectious organism, unspecified part of lung      I spent 54 minutes in the professional and overall care of this patient.      Gracie Hollins MD

## 2024-05-28 NOTE — PROGRESS NOTES
"  INFECTIOUS DISEASE DAILY PROGRESS NOTE    SUBJECTIVE:    I just saw patient 4 days ago and he is re-admitted again. I'll write this as a follow-up rather than new consultation.    Back with hypotension, shortness of breath. Is on 3L NC now. No fever, leukocytosis. Denies chills. CXR with possible infiltrate in the left mid lung and right suprahilar area.     OBJECTIVE:  VITALS (Last 24 Hours)  /81 (Patient Position: Lying)   Pulse 83   Temp 36.1 °C (97 °F)   Resp 18   Ht 1.753 m (5' 9\")   Wt 68.5 kg (151 lb)   SpO2 98%   BMI 22.30 kg/m²     PHYSICAL EXAM:  Gen - in bed, looks tired, 3L NC  Heart - RRR  Lungs- crackles both lungs, no wheezing  Abd - soft, no ttp  RLE - s/p AKA  RUE - s/p amputation  Skin - no rash    ABX: IV Daptomycin and PO Linezolid    LABS:  Lab Results   Component Value Date    WBC 9.7 05/28/2024    HGB 8.4 (L) 05/28/2024    HCT 27.1 (L) 05/28/2024    MCV 89 05/28/2024     05/28/2024     Lab Results   Component Value Date    GLUCOSE 146 (H) 05/28/2024    CALCIUM 8.8 05/28/2024     05/28/2024    K 4.3 05/28/2024    CO2 23 05/28/2024    CL 98 05/28/2024    BUN 19 05/28/2024    CREATININE 6.01 (H) 05/28/2024     Results from last 72 hours   Lab Units 05/28/24  0416   ALK PHOS U/L 124*   BILIRUBIN TOTAL mg/dL 0.8   PROTEIN TOTAL g/dL 6.8   ALT U/L 5*   AST U/L 10   ALBUMIN g/dL 2.5*     Estimated Creatinine Clearance: 15.8 mL/min (A) (by C-G formula based on SCr of 6.01 mg/dL (H)).    ASSESSMENT/PLAN:     CLABSI (HD cath POA) due to MRSA - finally just cleared bacteremia with combination therapy Dapto/Ceftaroline and 2x line holidays. TTE/POLI without endocarditis. Had been on IV Daptomycin/Linezolid as outpatient only 2-3 days.  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 Has peripheral eosinophilia concerning for this and I reviewed his CXR images - there are bilateral infiltrates. I doubt he would be volume overloaded as has had plenty of " dialysis.    Stopped Daptomycin. Hold off on steroids for now given his recent bacteremia that just cleared. If hypoxia is worsening we will need to consider steroids. Try to achieve euvolemia or some fluid removal to keep lungs as dry as we can.    Will place on IV Vancomycin and continue on PO Linezolid.     Will follow. Thanks!    Mat Jauregui MD  [facility]  Office #[phone]

## 2024-05-28 NOTE — H&P
HISTORY AND PHYSICAL EXAMINATION - INTERNAL MEDICINE    PATIENT NAME: Edwar Hemphill    MRN: [id number]  SERVICE DATE: 5/28/2024   SERVICE TIME:  11:45 AM    PRIMARY CARE PHYSICIAN:  Mauricio Estrada MD    ASSESSMENT & PLAN     39 yr old with sob past one day with hypotension and cxr with b/I infilterate --> per ID pulmonary toxicity of daptomycin    Recent bacteremia 2/2 line   Anemia 2/2 renal disease  Esrd  T2dm  Pvd with multiple amputation   Oa         SUBJECTIVE  CHIEF COMPLAINT:   sob, weakness     HISTORY OF PRESENT ILLNESS:  Mr. Hemphill is a 39 y.o. male who presents with sob and weakness and not feeling well     PAST MEDICAL HISTORY:    Past Medical History:   Diagnosis Date    Chronic kidney disease     Diabetes mellitus (Multi)     ESRD (end stage renal disease) (Multi)     Essential (primary) hypertension 05/26/2017    HTN (hypertension), benign    GERD (gastroesophageal reflux disease)     Hyperlipidemia     Hypothyroidism        PAST SURGICAL HISTORY:    Past Surgical History:   Procedure Laterality Date    ARM AMPUTATION AT ELBOW Right 05/2021    AV FISTULA PLACEMENT W/ PTFE      CT AORTA AND BILATERAL ILIOFEMORAL RUNOFF ANGIOGRAM W AND/OR WO IV CONTRAST  02/09/2023    CT AORTA AND BILATERAL ILIOFEMORAL RUNOFF ANGIOGRAM W AND/OR WO IV CONTRAST 2/9/2023 [facility]CTOR OFF[facility]    IR CVC EXCHANGE  11/13/2023    IR CVC EXCHANGE 11/13/2023 [facility]    IR CVC PICC  10/19/2023    IR CVC PICC    IR CVC PICC  2/28/2022    IR CVC PICC    LEG AMPUTATION THROUGH KNEE Left 07/08/2023    LEG AMPUTATION THROUGH LOWER TIBIA AND FIBULA Left 07/05/2023    TOE AMPUTATION Left 02/17/2023    left 4th    WOUND DEBRIDEMENT Left 07/26/2023    leg    WOUND DEBRIDEMENT Left 08/02/2023    multiple leg debridements       FAMILY HISTORY:    Family History   Problem Relation Name Age of Onset    Other (DIABETES DUE TO UNDERLYING CONDITION WITH MICROALBUMINURIA) Father      Other (DIABETES DUE TO UNDERLYING CONDITION  WITH MICROALBUMINURIA [Other]) Other AUNT     Other (DIABETES DUE TO UNDERLYING CONDITION WITH MICROALBUMINURIA [Other]) Other GP        SOCIAL HISTORY:  [unfilled]    MEDICATIONS:  Medications Prior to Admission   Medication Sig Dispense Refill Last Dose    epoetin tyson-epbx (Retacrit) 10,000 unit/mL injection Inject 1 mL (10,000 Units) under the skin 3 times a week. 12 mL 11 Past Week    insulin glargine (Lantus) 100 unit/mL injection Inject 2 Units under the skin once daily at bedtime. Take as directed per insulin instructions.   5/26    insulin lispro (HumaLOG) 100 unit/mL injection Inject 0-0.15 mL (0-15 Units) under the skin 3 times a day with meals. Take as directed per insulin instructions. 13.5 mL 11 5/27/2024 at 9:00 AM    acetaminophen (Tylenol) 325 mg tablet Take 2 tablets (650 mg) by mouth every 4 hours if needed for moderate pain (4 - 6). 30 tablet 0     apixaban (Eliquis) 5 mg tablet Take 1 tablet (5 mg) by mouth twice a day.       B complex-vitamin C-folic acid (Nephrocaps) 1 mg capsule Take 1 capsule by mouth once daily.       cyclobenzaprine (Flexeril) 5 mg tablet Take 1 tablet (5 mg) by mouth 3 times a day as needed for muscle spasms. 30 tablet 0 Unknown    DAPTOmycin (Cubicin) 700 mg/100 mL IV Infuse 100 mL (700 mg) at 200 mL/hr over 30 minutes into a venous catheter every other day for 178 doses. 1500 mL 11 Unknown    levothyroxine (Synthroid, Levoxyl) 125 mcg tablet Take 1 tablet (125 mcg) by mouth once daily in the morning. Take before meals.   Unknown    linezolid (Zyvox) 600 mg tablet Take 1 tablet (600 mg) by mouth 2 times a day for 14 days.   Unknown    loperamide (Imodium) 2 mg capsule Take 2 capsules (4 mg) by mouth every 2 hours if needed for diarrhea (FOR LOOSE STOLLS. 2 TABLETS AFTER EVERY EPISODE. DO NOT EXCEED 16MG TOTAL IN 24 HOURS.).   More than a month    midodrine (Proamatine) 5 mg tablet Take 1 tablet (5 mg) by mouth once daily on dialysis days for 15 doses. 10 tablet 1  Unknown    nitroglycerin (Nitrostat) 0.4 mg SL tablet Place 1 tablet (0.4 mg) under the tongue every 5 minutes if needed for chest pain.       oxyCODONE-acetaminophen (Percocet) 5-325 mg tablet Take 1 tablet by mouth every 6 hours if needed for severe pain (7 - 10). 10 tablet 0     pantoprazole (ProtoNix) 40 mg EC tablet Take 1 tablet (40 mg) by mouth once daily in the morning. Take before meals. Do not crush, chew, or split. Do not start before November 17, 2023. 30 tablet 11 5/26/2024    polyethylene glycol (Glycolax, Miralax) 17 gram packet Take 17 g by mouth once daily. DISSOLVE IN 4-8 OUNCES OF LIQUID AND TAKE ORALLY AS DIRECTED       sennosides (Senokot) 8.6 mg tablet Take 1 tablet (8.6 mg) by mouth twice a day.       sevelamer carbonate (Renvela) 800 mg tablet Take 3 tablets (2,400 mg) by mouth 3 times daily (morning, midday, late afternoon).   5/24/2024    simethicone (Mylicon) 80 mg chewable tablet Chew 1 tablet (80 mg) every 8 hours if needed for flatulence.   5/25/2024       ALLERGIES:  Allergies   Allergen Reactions    Cashew Nut Anaphylaxis and Swelling     cashews       COMPLETE REVIEW OF SYSTEMS:    PAIN ASSESSMENT: back pain.  GENERAL: + malaise, No weight loss  or fevers.  HEENT: Negative for frequent or significant headaches, No changes in hearing or vision, no nose bleeds or other nasal problems  NECK: Negative for lumps, goiter, pain and significant neck swelling  RESPIRATORY: + sob, Negative for cough, hemoptysis, wheezing   CARDIOVASCULAR: Negative for chest pain, leg swelling or palpitations.  GI: No nausea, vomiting, or diarrhea  : No history of dysuria, frequency or incontinence.  MUSCULOSKELETAL: Negative for joint pain or swelling, back pain or muscle pain.  SKIN: Negative for lesions, rash, and itching.  PSYCH: Negative for sleep disturbance, mood disorder and recent psychosocial stressors.  HEMATOLOGY/LYMPHOLOGY: Negative for prolonged bleeding, bruising easily or swollen  "nodes.  ENDOCRINE: Negative for cold or heat intolerance, polyuria, polydipsia and goiter.  NEURO: No history of headaches, syncope, paralysis, seizures or tremors      OBJECTIVE  PHYSICAL EXAM:  Patient Vitals for the past 24 hrs:   BP Temp Pulse Resp SpO2 Height Weight   05/28/24 0759 122/81 36.1 °C (97 °F) 83 18 98 % -- --   05/28/24 0730 102/74 -- 82 16 98 % -- --   05/28/24 0700 106/74 -- 82 18 98 % -- --   05/28/24 0600 104/72 -- 84 18 96 % -- --   05/28/24 0545 108/70 -- 84 18 97 % -- --   05/28/24 0316 111/75 36.7 °C (98 °F) 89 18 97 % 1.753 m (5' 9\") 68.5 kg (151 lb)     Body mass index is 22.3 kg/m².  GENERAL: alert, no distress, cooperative- no visitors present at the bedside  SKIN: limited exam.  HEAD/SINUSES: No significant findings. Head atraumatic and normocephalic   EYES: PERRLA and EOMI  EARS: External ears normal  NOSE: Nares normal. Septum midline.  OROPHARYNX: Lips, mucosa, and tongue normal. Teeth and gums normal. Tongue midline. Oropharynx normal.  NECK: no jugulovenous distention, no carotid bruits, carotid pulse normal contour, supple, thyroid is normal  BACK:  No CVAT.  LUNGS: sym exp, no dullness to percussion, Lungs clear to auscultation. Good diaphragmatic excursion.   CARDIAC: normal S1 and S2; no rubs, murmurs, or gallops, s3 or s4.  PMI 5th ICS 1 cm lateral-  Chest - Symmetrical chest wall expansion  Lymphatic: Neck, axilla and groin neg  ABDOMEN: Abdomen soft, non-tender. BS normal. No masses or organomegaly. No hernia, No guarding, no rigidity   EXTREMITIES: rt arm amputation, left leg aka   NEURO: Cranial nerves II-XII intact. Sensation intact and tone appears normal- TOMAS  PULSES:  radial, brachial, femoral, and pedal pulses palpable 2+  GENITALIA/RECTAL: Deferred  MS: DIP, PIP changes of DJD- no clubbing, no effusion     DATA:   Diagnostic tests reviewed for today's visit:    Most recent labs  Most recent imaging  t75  SIGNATURE: Mauricio Estrada MD  DATE: May 28, 2024  TIME: 11:45 " AM

## 2024-05-28 NOTE — CARE PLAN
The patient's goals for the shift include      The clinical goals for the shift include Maintain patient safety    Over the shift, the patient did not make progress toward the following goals. Barriers to progression include Patient's limited mobility. Recommendations to address these barriers include frequent ronding.

## 2024-05-28 NOTE — CONSULTS
Vancomycin Dosing by Pharmacy- INITIAL    Edwar Hemphill is a 39 y.o. year old male who Pharmacy has been consulted for vancomycin dosing for other Bacteremia . Based on the patient's indication and renal status this patient will be dosed based on a goal pre-HD level of 20-25.     Current on HD MWF    Visit Vitals  /81 (Patient Position: Lying)   Pulse 83   Temp 36.1 °C (97 °F)   Resp 18        Lab Results   Component Value Date    CREATININE 6.01 (H) 2024    CREATININE 5.17 (H) 2024    CREATININE 7.14 (H) 2024    CREATININE 6.27 (H) 2024        Patient weight is as follows:   Vitals:    24 0316   Weight: 68.5 kg (151 lb)       Cultures:  No results found for the encounter in last 14 days.        No intake/output data recorded.  I/O during current shift:  No intake/output data recorded.    Temp (24hrs), Av.4 °C (97.5 °F), Min:36.1 °C (97 °F), Max:36.7 °C (98 °F)         Assessment/Plan     Patient will be given a loading dose of 1500 mg. once    Follow-up level will be ordered before 2nd HD session likely on Friday unless clinically indicated sooner.  Will continue to monitor renal function daily while on vancomycin and order serum creatinine at least every 48 hours if not already ordered.  Follow for continued vancomycin needs, clinical response, and signs/symptoms of toxicity.       Deep Rich, PharmD

## 2024-05-28 NOTE — PROGRESS NOTES
Transitional Care Coordination Progress Note:  Plan per Medical/Surgical team: treatment of PN with IV ATB, duoneb, oxygen  Status: Inpatient   Payor source: malinda calderóncasandra dual, [facility]  Discharge disposition: [facility]  Potential Barriers: Dialysis M/W/F  ADOD: 5/30/2024  MICK Mckinnon RN, BSN Transitional Care Coordinator ED# [phone]      05/28/24 0761   Discharge Planning   Living Arrangements Alone   Support Systems Parent   Assistance Needed dialysis M/W/F   Type of Residence Nursing home/residential care   Do you have animals or pets at home? No   Home or Post Acute Services Post acute facilities (Rehab/SNF/etc)   Type of Post Acute Facility Services Long term care   Patient expects to be discharged to: [facility]   Does the patient need discharge transport arranged? Yes   RoundTrip coordination needed? Yes   Has discharge transport been arranged? No   Financial Resource Strain   How hard is it for you to pay for the very basics like food, housing, medical care, and heating? Not hard   Housing Stability   In the last 12 months, was there a time when you were not able to pay the mortgage or rent on time? N   In the last 12 months, how many places have you lived? 1   In the last 12 months, was there a time when you did not have a steady place to sleep or slept in a shelter (including now)? N   Transportation Needs   In the past 12 months, has lack of transportation kept you from medical appointments or from getting medications? no   In the past 12 months, has lack of transportation kept you from meetings, work, or from getting things needed for daily living? No

## 2024-05-28 NOTE — ED PROVIDER NOTES
Chief Complaint   Patient presents with    Hypoxia     HPI:   Edwar Hemphill is an 39 y.o. male with complicated PMH including GERD, HTN, hypothyroidism, PVD (s/p left AKA and right forearm amputation), T1DM with ESRD (femoral fistula, HD M/W/F), HFrEF (EF 35 to 40%, 5/14/2024), who was discharged from the hospital 2 days ago after be admitting for more than a month due to SIRS and bacteremia presents to the ED via EMS from [facility] for evaluation of hypoxia, hypotension and dizziness.  Per EMS, patient sent in for O2 in the 80s on room air, was placed on 2 L nasal cannula.  Found to be hypotensive at 76/46.  Midodrine brought BP up. He was discharged from the hospital 2 days ago after close to month-long stay for SIRS that was complicated by ICU stay for DKA.  He says he received dialysis 4 days straight prior to being discharged and he thinks that was what caused his symptoms.  He he says he did not receive dialysis on Monday due to the holiday.  EMS states that he was complaining of dizziness however patient denies dizziness.  He denies any symptoms at all including no dizziness lightheadedness, double vision, speech changes, fever, chills, chest pain, shortness of breath, fever, numbness, tingling, abdominal pain, weakness.  He is anuric.    Medications: Eliquis, Lantus, Retacrit, Synthroid, loperamide, Protonix, Senokot, Renvela, daptomycin, linezolid, midodrine  Soc HX:   Allergies   Allergen Reactions    Cashew Nut Anaphylaxis and Swelling     cashews   :  Past Medical History:   Diagnosis Date    Chronic kidney disease     Diabetes mellitus (Multi)     ESRD (end stage renal disease) (Multi)     Essential (primary) hypertension 05/26/2017    HTN (hypertension), benign    GERD (gastroesophageal reflux disease)     Hyperlipidemia     Hypothyroidism      Past Surgical History:   Procedure Laterality Date    ARM AMPUTATION AT ELBOW Right 05/2021    AV FISTULA PLACEMENT W/ PTFE      CT AORTA AND BILATERAL ILIOFEMORAL  RUNOFF ANGIOGRAM W AND/OR WO IV CONTRAST  02/09/2023    CT AORTA AND BILATERAL ILIOFEMORAL RUNOFF ANGIOGRAM W AND/OR WO IV CONTRAST 2/9/2023 DOCTOR OFFICE [facility]    IR CVC EXCHANGE  11/13/2023    IR CVC EXCHANGE 11/13/2023 [facility]    IR CVC PICC  10/19/2023    IR CVC PICC    IR CVC PICC  2/28/2022    IR CVC PICC    LEG AMPUTATION THROUGH KNEE Left 07/08/2023    LEG AMPUTATION THROUGH LOWER TIBIA AND FIBULA Left 07/05/2023    TOE AMPUTATION Left 02/17/2023    left 4th    WOUND DEBRIDEMENT Left 07/26/2023    leg    WOUND DEBRIDEMENT Left 08/02/2023    multiple leg debridements     Family History   Problem Relation Name Age of Onset    Other (DIABETES DUE TO UNDERLYING CONDITION WITH MICROALBUMINURIA) Father      Other (DIABETES DUE TO UNDERLYING CONDITION WITH MICROALBUMINURIA [Other]) Other AUNT     Other (DIABETES DUE TO UNDERLYING CONDITION WITH MICROALBUMINURIA [Other]) Other GP       Physical Exam  Vitals and nursing note reviewed.   Constitutional:       General: He is not in acute distress.     Appearance: He is ill-appearing.   HENT:      Right Ear: External ear normal.      Left Ear: External ear normal.      Mouth/Throat:      Mouth: Mucous membranes are dry.   Eyes:      Pupils: Pupils are equal, round, and reactive to light.      Comments: EOMI.  No dizziness with eye movement.  Bilateral cataract.  Discharged bilateral eyelids.   Cardiovascular:      Rate and Rhythm: Normal rate and regular rhythm.      Pulses: Normal pulses.      Heart sounds: Murmur heard.      Comments: Edema right lower extremity  Pulmonary:      Effort: Pulmonary effort is normal. No respiratory distress.      Breath sounds: Normal breath sounds.   Abdominal:      General: Bowel sounds are normal.      Palpations: Abdomen is soft.      Tenderness: There is no abdominal tenderness. There is no guarding or rebound.   Musculoskeletal:      Cervical back: Normal range of motion.      Comments: Left AKA, right forearm amputation.   Patient spontaneously moves all extremities   Skin:     General: Skin is warm and dry.      Capillary Refill: Capillary refill takes less than 2 seconds.   Neurological:      Mental Status: He is alert. Mental status is at baseline.      Cranial Nerves: No cranial nerve deficit.      Sensory: No sensory deficit.     VS: As documented in the triage note and EMR flowsheet from this visit were reviewed.    External Records Reviewed: I reviewed recent and relevant outside records including: Reviewed echo from 5/13/2024.  Showed decreased LVEF with EF 35 to 40%, no vegetations.  Reviewed hospital discharge.  Patient admitted 4/30-5/26/2024 for SIRS.  Noted to have positive bacteremia.  Hemodialysis catheter was removed.  MRI negative for osteo-.  POLI POLI negative for endocarditis.  Stay was complicated by DKA which required ICU intervention.    EKG INTERPRETATION:      Personally Reviewed      Rhythm:  Normal sinus rhythm      Rate:   84 bpm     Axis: LAD      Intervals:  Normal GA interval 154 ms     QRS Complex:  Normal      ST Segment: T wave inversions aVL, V5, V6      QT Interval: QTc 465 ms      Compared with Prior: T wave inversions new      Medical Decision Making:   ED Course as of 05/28/24 0554   Tue May 28, 2024   0333 Vitals Reviewed: Afebrile. Normotensive. Not tachycardic nor tachypneic. No hypoxia.   [KA]   8544 Patient is an ill-appearing 39-year-old male who presents to the ED for evaluation of hypotension and hypoxia.  On arrival oxygen appropriate while on 2 L nasal cannula.  Patient denies shortness of breath.  He is currently on linezolid and daptomycin.  Sounds like he missed dialysis yesterday due to the holiday.  He was complaining of dizziness per EMS however is not complaining of dizziness during my assessment.  Will obtain labs.  Likely patient will need to be admitted due to his complicated chronic comorbidities and recent discharge from prolonged hospital stay along with his new hypoxia,  oxygen requirement.  Possible he may have pneumonia or COVID. [KA]   0536 I personally viewed labs.  CBC shows you have stable hemoglobin.  Lactate, magnesium, troponin normal.  Creatinine 6.01.  Hypoalbuminemia.  Elevated alk phos.  Electrolytes normal. [KA]   0551 Radiology reads x-ray imaging as  IMPRESSION:  1. New patchy left mid lung and right suprahilar airspace opacities  when compared to 04/30/2024. Consider pneumonia.  2. Mild diffuse interstitial prominence, likely interstitial edema.  3. Similar small bilateral pleural effusions.   [KA]   0551 Patient will necessitate admission to the hospital and will require more than 2 midnights.  Concerned that he has a new developing pneumonia even with all of the heavy-duty antibiotics he is currently taking.  Also concerned about reported hypoxia.  He is not normally on oxygen and is currently on 2 L nasal cannula.  I discussed patient's case with Dr. Kaufman and he agreed to accept patient back to his care. [KA]      ED Course User Index  [KA] Nadiya Cole PA-C         Diagnoses as of 05/28/24 0554   Pneumonia of both lungs due to infectious organism, unspecified part of lung   Hypoxia   ESRD (end stage renal disease) (Multi)      Chronic Medical Conditions Significantly Affecting Care: ESRD, T1DM, amputee     Escalation of Care: Appropriate for hospitalization       Discussion of Management with Other Providers:  I discussed the patient/results with: Christine Rhodes    Counseling: Spoke with the patient and discussed today´s findings, in addition to providing specific details for the plan of care and expected course.  Patient was given the opportunity to ask questions.  Educated on the common potential side effects of medications prescribed.    The plan of care was mutually agreed upon with the patient. The patient and/or family were given the opportunity to ask questions. All questions asked today in the ED were answered to the best of my ability with  today's information.    This patient was cared for in the setting of nationwide stress on resources and staffing.    This report was transcribed using voice recognition software.  Every effort was made to ensure accuracy, however, inadvertently computerized transcription errors may be present.       Nadiya Cole PA-C  05/28/24 0549

## 2024-05-28 NOTE — CARE PLAN
The patient's goals for the shift include      The clinical goals for the shift include Maintain patient safety    Over the shift, the patient did not make progress toward the following goals. Barriers to progression include Patient's limited mobility. Recommendations to address these barriers include frequent rounding.

## 2024-05-28 NOTE — PROGRESS NOTES
05/28/24 0724   [facility] Disability Status   Are you deaf or do you have serious difficulty hearing? N   Are you blind or do you have serious difficulty seeing, even when wearing glasses? N   Because of a physical, mental, or emotional condition, do you have serious difficulty concentrating, remembering, or making decisions? (5 years old or older) Y   Do you have serious difficulty walking or climbing stairs? Y   Do you have serious difficulty dressing or bathing? Y   Because of a physical, mental, or emotional condition, do you have serious difficulty doing errands alone such as visiting the doctor? Y

## 2024-05-28 NOTE — PROGRESS NOTES
[facility]     05/28/24 0724   Current Planned Discharge Disposition   Current Planned Discharge Disposition Inter

## 2024-05-28 NOTE — ED TRIAGE NOTES
Pt presents today from [facility] for hypoxia, hypotension, and dizziness. Dr sent pt in, hypoxic in the 80's on RA, placed on 2L NC. Hypotensive at 76/46, given 10mg of midodrine which brought his BP up to 100/50 and is 111/75 (87) here. Pt denies any complaints at this time. Dialysis pt, fistula in the right groin, amputation of the left leg and right arm.

## 2024-05-29 ENCOUNTER — APPOINTMENT (OUTPATIENT)
Dept: DIALYSIS | Facility: HOSPITAL | Age: 39
End: 2024-05-29
Payer: COMMERCIAL

## 2024-05-29 LAB
ANION GAP BLDV CALCULATED.4IONS-SCNC: 10 MMOL/L (ref 10–25)
BASE EXCESS BLDV CALC-SCNC: 2.7 MMOL/L (ref -2–3)
BODY TEMPERATURE: 37 DEGREES CELSIUS
CA-I BLDV-SCNC: 1.28 MMOL/L (ref 1.1–1.33)
CHLORIDE BLDV-SCNC: 101 MMOL/L (ref 98–107)
GLUCOSE BLD MANUAL STRIP-MCNC: 137 MG/DL (ref 74–99)
GLUCOSE BLD MANUAL STRIP-MCNC: 144 MG/DL (ref 74–99)
GLUCOSE BLD MANUAL STRIP-MCNC: 145 MG/DL (ref 74–99)
GLUCOSE BLD MANUAL STRIP-MCNC: 201 MG/DL (ref 74–99)
GLUCOSE BLDV-MCNC: 151 MG/DL (ref 74–99)
HCO3 BLDV-SCNC: 27.9 MMOL/L (ref 22–26)
HCT VFR BLD EST: 26 % (ref 41–52)
HGB BLDV-MCNC: 8.7 G/DL (ref 13.5–17.5)
INHALED O2 CONCENTRATION: 21 %
LACTATE BLDV-SCNC: 1.2 MMOL/L (ref 0.4–2)
OXYHGB MFR BLDV: 40.4 % (ref 45–75)
PCO2 BLDV: 45 MM HG (ref 41–51)
PH BLDV: 7.4 PH (ref 7.33–7.43)
PO2 BLDV: 33 MM HG (ref 35–45)
POTASSIUM BLDV-SCNC: 4.7 MMOL/L (ref 3.5–5.3)
SAO2 % BLDV: 41 % (ref 45–75)
SODIUM BLDV-SCNC: 134 MMOL/L (ref 136–145)

## 2024-05-29 PROCEDURE — 82947 ASSAY GLUCOSE BLOOD QUANT: CPT | Mod: 91

## 2024-05-29 PROCEDURE — 2500000001 HC RX 250 WO HCPCS SELF ADMINISTERED DRUGS (ALT 637 FOR MEDICARE OP): Performed by: INTERNAL MEDICINE

## 2024-05-29 PROCEDURE — 87075 CULTR BACTERIA EXCEPT BLOOD: CPT | Mod: 91,AHULAB | Performed by: INTERNAL MEDICINE

## 2024-05-29 PROCEDURE — 99221 1ST HOSP IP/OBS SF/LOW 40: CPT | Performed by: INTERNAL MEDICINE

## 2024-05-29 PROCEDURE — 1100000001 HC PRIVATE ROOM DAILY

## 2024-05-29 PROCEDURE — 2500000002 HC RX 250 W HCPCS SELF ADMINISTERED DRUGS (ALT 637 FOR MEDICARE OP, ALT 636 FOR OP/ED): Mod: MUE | Performed by: SPECIALIST

## 2024-05-29 PROCEDURE — 8010000001 HC DIALYSIS - HEMODIALYSIS PER DAY

## 2024-05-29 PROCEDURE — 2500000001 HC RX 250 WO HCPCS SELF ADMINISTERED DRUGS (ALT 637 FOR MEDICARE OP): Performed by: SPECIALIST

## 2024-05-29 PROCEDURE — 36415 COLL VENOUS BLD VENIPUNCTURE: CPT | Performed by: INTERNAL MEDICINE

## 2024-05-29 PROCEDURE — 87040 BLOOD CULTURE FOR BACTERIA: CPT | Mod: AHULAB | Performed by: INTERNAL MEDICINE

## 2024-05-29 PROCEDURE — 87070 CULTURE OTHR SPECIMN AEROBIC: CPT | Mod: AHULAB | Performed by: INTERNAL MEDICINE

## 2024-05-29 PROCEDURE — 2500000004 HC RX 250 GENERAL PHARMACY W/ HCPCS (ALT 636 FOR OP/ED): Performed by: PHARMACIST

## 2024-05-29 PROCEDURE — 5A1D70Z PERFORMANCE OF URINARY FILTRATION, INTERMITTENT, LESS THAN 6 HOURS PER DAY: ICD-10-PCS | Performed by: INTERNAL MEDICINE

## 2024-05-29 PROCEDURE — 2500000004 HC RX 250 GENERAL PHARMACY W/ HCPCS (ALT 636 FOR OP/ED): Performed by: INTERNAL MEDICINE

## 2024-05-29 RX ORDER — MIDODRINE HYDROCHLORIDE 5 MG/1
5 TABLET ORAL
Status: DISCONTINUED | OUTPATIENT
Start: 2024-05-29 | End: 2024-06-01 | Stop reason: HOSPADM

## 2024-05-29 RX ORDER — HEPARIN SODIUM 1000 [USP'U]/ML
2400 INJECTION, SOLUTION INTRAVENOUS; SUBCUTANEOUS
Status: DISCONTINUED | OUTPATIENT
Start: 2024-05-29 | End: 2024-06-01 | Stop reason: HOSPADM

## 2024-05-29 RX ORDER — VANCOMYCIN HYDROCHLORIDE 750 MG/150ML
750 INJECTION, SOLUTION INTRAVENOUS ONCE
Status: COMPLETED | OUTPATIENT
Start: 2024-05-29 | End: 2024-05-29

## 2024-05-29 RX ADMIN — INSULIN GLARGINE 2 UNITS: 100 INJECTION, SOLUTION SUBCUTANEOUS at 22:48

## 2024-05-29 RX ADMIN — VANCOMYCIN HYDROCHLORIDE 750 MG: 750 INJECTION, SOLUTION INTRAVENOUS at 20:29

## 2024-05-29 RX ADMIN — SEVELAMER CARBONATE 2400 MG: 800 TABLET, FILM COATED ORAL at 08:56

## 2024-05-29 RX ADMIN — PANTOPRAZOLE SODIUM 40 MG: 40 TABLET, DELAYED RELEASE ORAL at 06:53

## 2024-05-29 RX ADMIN — HEPARIN SODIUM 2400 UNITS: 1000 INJECTION INTRAVENOUS; SUBCUTANEOUS at 12:30

## 2024-05-29 RX ADMIN — HEPARIN SODIUM 2400 UNITS: 1000 INJECTION INTRAVENOUS; SUBCUTANEOUS at 11:17

## 2024-05-29 RX ADMIN — OXYCODONE HYDROCHLORIDE AND ACETAMINOPHEN 1 TABLET: 5; 325 TABLET ORAL at 12:06

## 2024-05-29 RX ADMIN — Medication 1 CAPSULE: at 08:56

## 2024-05-29 RX ADMIN — HEPARIN SODIUM 2400 UNITS: 1000 INJECTION INTRAVENOUS; SUBCUTANEOUS at 11:04

## 2024-05-29 RX ADMIN — MIDODRINE HYDROCHLORIDE 5 MG: 5 TABLET ORAL at 09:06

## 2024-05-29 RX ADMIN — LINEZOLID 600 MG: 600 TABLET, FILM COATED ORAL at 20:29

## 2024-05-29 RX ADMIN — LEVOTHYROXINE SODIUM 125 MCG: 125 TABLET ORAL at 06:53

## 2024-05-29 ASSESSMENT — COGNITIVE AND FUNCTIONAL STATUS - GENERAL
EATING MEALS: A LITTLE
MOVING FROM LYING ON BACK TO SITTING ON SIDE OF FLAT BED WITH BEDRAILS: A LOT
EATING MEALS: A LITTLE
PERSONAL GROOMING: A LOT
HELP NEEDED FOR BATHING: A LOT
WALKING IN HOSPITAL ROOM: TOTAL
STANDING UP FROM CHAIR USING ARMS: TOTAL
TURNING FROM BACK TO SIDE WHILE IN FLAT BAD: A LOT
MOVING TO AND FROM BED TO CHAIR: TOTAL
TURNING FROM BACK TO SIDE WHILE IN FLAT BAD: A LOT
TOILETING: A LOT
DRESSING REGULAR UPPER BODY CLOTHING: A LOT
DAILY ACTIVITIY SCORE: 13
MOVING TO AND FROM BED TO CHAIR: TOTAL
HELP NEEDED FOR BATHING: A LOT
MOBILITY SCORE: 8
DAILY ACTIVITIY SCORE: 13
MOVING FROM LYING ON BACK TO SITTING ON SIDE OF FLAT BED WITH BEDRAILS: A LOT
TOILETING: A LOT
DRESSING REGULAR LOWER BODY CLOTHING: A LOT
DRESSING REGULAR LOWER BODY CLOTHING: A LOT
CLIMB 3 TO 5 STEPS WITH RAILING: TOTAL
DRESSING REGULAR UPPER BODY CLOTHING: A LOT
CLIMB 3 TO 5 STEPS WITH RAILING: TOTAL
WALKING IN HOSPITAL ROOM: TOTAL
STANDING UP FROM CHAIR USING ARMS: TOTAL
MOBILITY SCORE: 8
PERSONAL GROOMING: A LOT

## 2024-05-29 ASSESSMENT — PAIN SCALES - GENERAL
PAINLEVEL_OUTOF10: 0 - NO PAIN
PAINLEVEL_OUTOF10: 10 - WORST POSSIBLE PAIN
PAINLEVEL_OUTOF10: 10 - WORST POSSIBLE PAIN
PAINLEVEL_OUTOF10: 0 - NO PAIN

## 2024-05-29 ASSESSMENT — ENCOUNTER SYMPTOMS: DIZZINESS: 1

## 2024-05-29 NOTE — NURSING NOTE
Patient returned from Dialysis.   Refusing afternoon medications as he states that he has having abdominal pain which began in dialysis.   Describes the pain as sharp.   Abdomen soft, non distended.   States that he will not take medications until he has spoken to his PCP.     Dr. Leonard's office called at approximately 1350.   Message left with staff -- provided patient's name and date of birth.    Arti Rich RN

## 2024-05-29 NOTE — CONSULTS
Inpatient consult to Endocrinology  Consult performed by: Mike Santana MD  Consult ordered by: Mauricio Estrada MD      Reason For Consult  Diabetes    History Of Present Illness  Edwar Hemphill is a 39 y.o. male readmitted yesterday with hypotension    Duration of type 2 diabetes mellitus:  28 years  Complications:  ESRD  Amputations     Insulin glargine 2 units resumed last night.     Recent admission with sepsis, line infection, DKA    A1c 6.8% (3/30/24)       Medications    Current Facility-Administered Medications:     acetaminophen (Tylenol) tablet 650 mg, 650 mg, oral, q4h PRN, Mauricio Estrada MD    B complex-vitamin C-folic acid (Nephrocaps) capsule 1 capsule, 1 capsule, oral, Daily, Mauricio Estrada MD    dextrose 50 % injection 12.5 g, 12.5 g, intravenous, q15 min PRN, Mauricio Estrada MD    dextrose 50 % injection 25 g, 25 g, intravenous, q15 min PRN, Mauricio Estrada MD    glucagon (Glucagen) injection 1 mg, 1 mg, intramuscular, q15 min PRN, Mauricio Estrada MD    glucagon (Glucagen) injection 1 mg, 1 mg, intramuscular, q15 min PRN, Mauricio Estrada MD    insulin glargine (Lantus) injection 2 Units, 2 Units, subcutaneous, Nightly, Mauricio Estrada MD, 2 Units at 05/28/24 2033    insulin lispro (HumaLOG) injection 0-5 Units, 0-5 Units, subcutaneous, TID, Mauricio Estrada MD, 1 Units at 05/28/24 1712    levothyroxine (Synthroid, Levoxyl) tablet 125 mcg, 125 mcg, oral, Daily before breakfast, Mauricio Estrada MD, 125 mcg at 05/29/24 0653    linezolid (Zyvox) tablet 600 mg, 600 mg, oral, BID, Mauricio Estrada MD    loperamide (Imodium) capsule 4 mg, 4 mg, oral, q2h PRN, Mauricio Estrada MD    midodrine (Proamatine) tablet 5 mg, 5 mg, oral, Before Dialysis, Mauricio Estrada MD    nitroglycerin (Nitrostat) SL tablet 0.4 mg, 0.4 mg, sublingual, q5 min PRN, Mauricio Estrada MD    oxyCODONE-acetaminophen (Percocet) 5-325 mg per tablet 1 tablet, 1 tablet, oral, q6h PRN, Mauricio Estrada MD    pantoprazole (ProtoNix) EC  tablet 40 mg, 40 mg, oral, Daily before breakfast, Mauricio Estrada MD, 40 mg at 05/29/24 0653    sevelamer carbonate (Renvela) tablet 2,400 mg, 2,400 mg, oral, TID, Mauricio Estrada MD, 2,400 mg at 05/28/24 1712    simethicone (Mylicon) chewable tablet 80 mg, 80 mg, oral, q8h PRN, Mauricio Estrada MD    vancomycin (Vancocin) pharmacy to dose - pharmacy monitoring, , miscellaneous, Daily PRN, Mat Jauregui MD    vancomycin in dextrose 5 % (Vancocin) IVPB 750 mg, 750 mg, intravenous, Once, Dori Flores, PharmD     Past Medical History  He has a past medical history of Chronic kidney disease, Diabetes mellitus (Multi), ESRD (end stage renal disease) (Multi), Essential (primary) hypertension (05/26/2017), GERD (gastroesophageal reflux disease), Hyperlipidemia, and Hypothyroidism.    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     Social History  He reports that he has been smoking cigarettes. He has a 3.8 pack-year smoking history. He has never used smokeless tobacco. No history on file for alcohol use and drug use.    Family History  Family History   Problem Relation Name Age of Onset    Other (DIABETES DUE TO UNDERLYING CONDITION WITH MICROALBUMINURIA) Father      Other (DIABETES DUE TO UNDERLYING CONDITION WITH MICROALBUMINURIA [Other]) Other AUNT     Other (DIABETES DUE TO UNDERLYING CONDITION WITH MICROALBUMINURIA [Other]) Other GP        Allergies  Cashew nut    Review of Systems   Neurological:  Positive for dizziness.         Last  "Recorded Vitals  Blood pressure 80/57, pulse 83, temperature 37.1 °C (98.8 °F), temperature source Oral, resp. rate 16, height 1.753 m (5' 9\"), weight 68.5 kg (151 lb), SpO2 97%.    Physical Exam  Constitutional:       General: He is not in acute distress.  HENT:      Head: Normocephalic.   Neurological:      Mental Status: He is alert.   Psychiatric:         Mood and Affect: Affect normal.          Relevant Results  Lab Results   Component Value Date    POCGLU 213 (H) 05/28/2024    POCGLU 154 (H) 05/28/2024    POCGLU 191 (H) 05/28/2024    POCGLU 203 (H) 05/28/2024    POCGLU 154 (H) 05/26/2024    GLUCOSE 146 (H) 05/28/2024    GLUCOSE 209 (H) 05/25/2024    GLUCOSE 226 (H) 05/24/2024    GLUCOSE 48 (LL) 05/23/2024    GLUCOSE 102 (H) 05/21/2024      Latest Reference Range & Units 05/28/24 04:16   GLUCOSE 74 - 99 mg/dL 146 (H)   SODIUM 136 - 145 mmol/L 136   POTASSIUM 3.5 - 5.3 mmol/L 4.3   CHLORIDE 98 - 107 mmol/L 98   Bicarbonate 21 - 32 mmol/L 23   Anion Gap 10 - 20 mmol/L 19   Blood Urea Nitrogen 6 - 23 mg/dL 19   Creatinine 0.50 - 1.30 mg/dL 6.01 (H)   EGFR >60 mL/min/1.73m*2 11 (L)   Calcium 8.6 - 10.3 mg/dL 8.8   Albumin 3.4 - 5.0 g/dL 2.5 (L)   Alkaline Phosphatase 33 - 120 U/L 124 (H)   ALT 10 - 52 U/L 5 (L)   AST 9 - 39 U/L 10   Bilirubin Total 0.0 - 1.2 mg/dL 0.8       IMPRESSION  DIABETIC KETOACIDOSIS, RESOLVED  TYPE 2 DIABETES MELLITUS  LONG TERM CURRENT INSULIN USE  Low insulin requirement       RECOMMENDATIONS  Continue insulin glargine 2 units at bedtime  Insulin lispro scale [facility]           Mike Santana MD    "

## 2024-05-29 NOTE — PROCEDURES
Seen on dialysis.  3 potassium bath.  Blood pressures are quite low, we will have difficulty removing fluid.  I will add erythropoietin.  I reviewed Dr. Jauregui's notes regarding potential daptomycin pulmonary toxicity.  Blood cultures were drawn, exit site was swabbed as there is pus there.  We will clean it up and place a new dressing.

## 2024-05-29 NOTE — CONSULTS
"Nutrition Assessment Note  Nutrition Assessment      Reason for Assessment  Reason for Assessment: Admission nursing screening  Hospital day #1 for PNA, bilateral.  Patient just admitted & known to staff from that admit.  Noted to have inadequate intake d/t menu fatigue with prolonged stay.  Family was willing to provide snacks to encourage intake.    History:  Food and Nutrient History  Energy Intake: Fair 50-75 %  Food and Nutrient History: Patient known to staff from previous admit.  States he was eating well for past 2 days, but now with abdominal pain, nausea, & emesis.  Defers Nepro shakes at this time.    Diagnosis   ESRD (end stage renal disease) on dialysis (Multi)   Hypertension   Itch   Pain   Trigger middle finger of left hand   Type 1 diabetes mellitus (Multi)   Vitamin D deficiency   PVD (peripheral vascular disease) (CMS-[facility])   DVT prophylaxis   Overweight with body mass index (BMI) 25.0-29.9   Hypothyroidism   HLD (hyperlipidemia)   GERD (gastroesophageal reflux disease)   Folic acid deficiency   Elevated alkaline phosphatase level   Cellulitis and abscess of left leg   Septic shock (Multi)   SIRS (systemic inflammatory response syndrome) (Multi)   Pneumonia of both lungs due to infectious organism, unspecified part of lung     Anthropometrics:  Height: 175.3 cm (5' 9.02\")  Weight: 68.5 kg (151 lb)  BMI (Calculated): 22.29  Weight Change  Weight History / % Weight Change: 24: 68.5 kg. 11/10/23: 79.4kg.  23: 92 kg.  21% loss over 8 months.  Some may be related to HD treatments.  Significant Weight Loss: Yes  IBW/kg (Dietitian Calculated): 72.7 kg   Amputation Calculations:  BMI Amputation Adjustment: Yes  Percent Amputation: Upper arm, Thigh  Total Amputation Percentage: 12.8  Adjusted IBW/k.7  Adjusted BMI: 25.6    Energy Needs:  Calculated Energy Needs Using Equations  Height: 175.3 cm (5' 9.02\")  Temp: 36.7 °C (98 °F)    Estimated Energy Needs  Method for Estimating Needs:  " @ 30-35 kcal/kg IBW    Estimated Protein Needs  Method for Estimating Needs:  @ 1.5-1.7 gr/kg IBW    Estimated Fluid Needs  Method for Estimating Needs: per MD     Dietary Orders  Adult diet Carb Controlled, Renal; 90 gram carb/meal, 60 gram Carb evening snack; Potassium Restricted 2 gm (50mEq); 2 - 3 grams Sodium      Nutrition Focused Physical Findings:  Subcutaneous Fat Loss  Orbital Fat Pads: Well nourished (slightly bulging fat pads)  Buccal Fat Pads: Mild-Moderate (flat cheeks, minimal bounce)    Muscle Wasting  Temporalis: Well nourished (well-defined muscle)  Pectoralis (Clavicular Region): Well nourished (clavicle not visible)  Deltoid/Trapezius: Well nourished (rounded appearance at arm, shoulder, neck)  Interosseous: Well nourished (muscle bulges)    Edema  Edema: +1 trace  Edema Location: RLE    Physical Findings (Nutrition Deficiency/Toxicity)  Skin: Positive (L LE surgical site.)     Results from the past 24 hour(s)  POCT GLUCOSE  Result Value Ref Range   POCT Glucose 213 (H) 74 - 99 mg/dL  POCT GLUCOSE  Result Value Ref Range   POCT Glucose 145 (H) 74 - 99 mg/dL  POCT GLUCOSE  Result Value Ref Range   POCT Glucose 137 (H) 74 - 99 mg/dL  POCT GLUCOSE  Result Value Ref Range   POCT Glucose 144 (H) 74 - 99 mg/dL     Nutrition Diagnosis   Malnutrition Diagnosis  Patient has Malnutrition Diagnosis: Yes  Diagnosis Status: New  Malnutrition Diagnosis: Severe malnutrition related to chronic disease or condition  As Evidenced by: 18% weight loss over 8 months, intake <75% meals for > 1 month.    Patient has Nutrition Diagnosis: Yes  Nutrition Diagnosis 1: Inadequate protein energy intake  Diagnosis Status (1): New  Related to (1): increased energy demands  As Evidenced by (1): intake <50% of meals.       Nutrition Diagnosis 2: Inadequate oral intake  Diagnosis Status (2): New  Related to (2): altered GI function  As Evidenced by (2): nausea, emesis, intake <25% today     Nutrition  Interventions/Recommendations   Nutrition Prescription  Individualized Nutrition Prescription Provided for : Continue diet as ordered, deferes Nepro shake d/t nausea. Can offer when nausea resolves. Encouraged hs snack to help with glycimic control & he agrees to have crackers with peanut butter. Daily renal MVI.    Food and/or Nutrient Delivery Interventions  Meals and Snacks: Mineral-modified diet, Carbohydrate-modified diet  Goal: intake meets >75% estimated nutrient needs.         Nutrition Monitoring and Evaluation   Food and Nutrient Related History   Amount of Food: Estimated amout of food  Criteria: intake >75% of meals    Anthropometrics: Body Composition/Growth/Weight History  Weight: Measured weight  Criteria: daily  Weight Change: Weight change percentage  Criteria: weekly    Biochemical Data, Medical Tests and Procedures  Electrolyte and Renal Panel: BUN, Calcium, serum, Chloride, Creatinine, Magnesium, Phosphorus, Potassium, Sodium  Criteria: as indicated  Glucose/Endocrine Profile: Glucose, casual, Hemoglobin A1c (HgbA1c)  Criteria: as indicated  Nutritional Anemia Profile: Folate, serum, Hematocrit, Hemoglobin, Iron, serum  Criteria: as indicated  Vitamin Profile: Vitamin D, 25 hydroxy, Other (Comment)  Criteria: as indicated    Nutrition Focused Physical Findings   Digestive System: Anorexia, Constipation, Diarrhea, Early satiety, Nausea, Vomiting  Criteria: daily     Follow Up  Last Date of Nutrition Visit: 05/29/24  Nutrition Follow-Up Needed?: Dietitian to reassess per policy  Follow up Comment: n/v, poor intake-TR

## 2024-05-29 NOTE — PROGRESS NOTES
"  INFECTIOUS DISEASE DAILY PROGRESS NOTE    SUBJECTIVE:    During HD cath dressing change noted to have purulence around the site and did not look clean at all. I spoke with Dr. Gonzalez and checking wound cx there and blood cx x2.     OBJECTIVE:  VITALS (Last 24 Hours)  BP 95/60 (Patient Position: Lying)   Pulse 63   Temp 36.7 °C (98.1 °F) (Temporal)   Resp 18   Ht 1.753 m (5' 9\")   Wt 68.5 kg (151 lb)   SpO2 93%   BMI 22.30 kg/m²     PHYSICAL EXAM:  Gen - in bed  Lungs- crackles both lungs, no wheezing  Abd - soft, no ttp  RLE - s/p AKA  RUE - s/p amputation  Skin - no rash     ABX: IV Vancomycin and PO Linezolid    LABS:  Lab Results   Component Value Date    WBC 9.7 05/28/2024    HGB 8.4 (L) 05/28/2024    HCT 27.1 (L) 05/28/2024    MCV 89 05/28/2024     05/28/2024     Lab Results   Component Value Date    GLUCOSE 146 (H) 05/28/2024    CALCIUM 8.8 05/28/2024     05/28/2024    K 4.3 05/28/2024    CO2 23 05/28/2024    CL 98 05/28/2024    BUN 19 05/28/2024    CREATININE 6.01 (H) 05/28/2024     Results from last 72 hours   Lab Units 05/28/24  0416   ALK PHOS U/L 124*   BILIRUBIN TOTAL mg/dL 0.8   PROTEIN TOTAL g/dL 6.8   ALT U/L 5*   AST U/L 10   ALBUMIN g/dL 2.5*     Estimated Creatinine Clearance: 15.8 mL/min (A) (by C-G formula based on SCr of 6.01 mg/dL (H)).    ASSESSMENT/PLAN:     Recent CLABSI (HD cath POA) due to MRSA - finally just cleared bacteremia with combination therapy Dapto/Ceftaroline and 2x line holidays. TTE/POLI without endocarditis. Had been on IV Daptomycin/Linezolid as outpatient only 2-3 days.  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 Has peripheral eosinophilia concerning for this and I reviewed his CXR images - there are bilateral infiltrates. I doubt he would be volume overloaded as has had plenty of dialysis.     IV Vancomycin and continue on PO Linezolid.    Will follow up on cultures from blood and swab from HD cath site. We may need to remove " the line but finding a new area for access if going to be a problem.     Will follow. Thanks! D/w Dr. Carlos Jauregui MD  [facility]  Office #[phone]

## 2024-05-29 NOTE — PROGRESS NOTES
Report to Receiving RN:    Report To: Arti  Time Report Called: [id number] (secure message)  Hand-Off Communication: pt tolerated tx ok, c/o bad stomach pain 10/10 given PRN percocet, took off 0.5L of fluid post /63 HR 69, wound culture obtained d/t drainage from CVC, blood cultures x2 collected per MD Gonzalez  Complications During Treatment: No  Ultrafiltration Treatment: No  Medications Administered During Dialysis: No  Blood Products Administered During Dialysis: No  Labs Sent During Dialysis: Yes  Heparin Drip Rate Changes: No  Dialysis Catheter Dressing: C/D/I  Last Dressing Change: 5/29/2024    Electronic Signatures:  Audie Augustine RN (Signed )   Authored:    (Signed )   Authored:     Last Updated: 12:37 PM by AUDIE AUGUSTINE

## 2024-05-29 NOTE — NURSING NOTE
Dr. Estrada aware of patient's refusal to take medications.   Request for anti-nausea meds from Dr. Natalie devlin for Zofran 4 mg's.   Dr. Leonard to place order.      Arti Rich RN

## 2024-05-29 NOTE — PROGRESS NOTES
Report from Sending RN:    Report From: Arti  Recent Surgery of Procedure: No  Baseline Level of Consciousness (LOC): AOx4  Oxygen Use: Yes  Type: NC  Diabetic: Yes  Last BP Med Given Day of Dialysis: See EMAR  Last Pain Med Given: See EMAR  Lab Tests to be Obtained with Dialysis: No  Blood Transfusion to be Given During Dialysis: No  Available IV Access: Yes  Medications to be Administered During Dialysis: No  Continuous IV Infusion Running: No  Restraints on Currently or in the Last 24 Hours: No  Hand-Off Communication: Pt stable and able to come to PACU for tx  Dialysis Catheter Dressing: Will Assess  Last Dressing Change: Will Assess

## 2024-05-29 NOTE — PROGRESS NOTES
05/29/24 0919   Discharge Planning   Patient expects to be discharged to: [facility]     Per notes, patient was just discharged and re-admitted from [facility], chest xray showing possible bilateral PNA, patient on IV vanco, once med ready will discharge back to [place] pt

## 2024-05-30 LAB
ALBUMIN SERPL BCP-MCNC: 2.3 G/DL (ref 3.4–5)
ANION GAP SERPL CALC-SCNC: 14 MMOL/L (ref 10–20)
BUN SERPL-MCNC: 11 MG/DL (ref 6–23)
CALCIUM SERPL-MCNC: 8.8 MG/DL (ref 8.6–10.3)
CHLORIDE SERPL-SCNC: 101 MMOL/L (ref 98–107)
CO2 SERPL-SCNC: 27 MMOL/L (ref 21–32)
CREAT SERPL-MCNC: 4.25 MG/DL (ref 0.5–1.3)
EGFRCR SERPLBLD CKD-EPI 2021: 17 ML/MIN/1.73M*2
ERYTHROCYTE [DISTWIDTH] IN BLOOD BY AUTOMATED COUNT: 18.3 % (ref 11.5–14.5)
GLUCOSE BLD MANUAL STRIP-MCNC: 154 MG/DL (ref 74–99)
GLUCOSE BLD MANUAL STRIP-MCNC: 172 MG/DL (ref 74–99)
GLUCOSE BLD MANUAL STRIP-MCNC: 269 MG/DL (ref 74–99)
GLUCOSE BLD MANUAL STRIP-MCNC: 97 MG/DL (ref 74–99)
GLUCOSE SERPL-MCNC: 98 MG/DL (ref 74–99)
HCT VFR BLD AUTO: 25.4 % (ref 41–52)
HGB BLD-MCNC: 7.6 G/DL (ref 13.5–17.5)
MCH RBC QN AUTO: 26.8 PG (ref 26–34)
MCHC RBC AUTO-ENTMCNC: 29.9 G/DL (ref 32–36)
MCV RBC AUTO: 89 FL (ref 80–100)
NRBC BLD-RTO: 0 /100 WBCS (ref 0–0)
PHOSPHATE SERPL-MCNC: 3.1 MG/DL (ref 2.5–4.9)
PLATELET # BLD AUTO: 185 X10*3/UL (ref 150–450)
POTASSIUM SERPL-SCNC: 3.7 MMOL/L (ref 3.5–5.3)
RBC # BLD AUTO: 2.84 X10*6/UL (ref 4.5–5.9)
SODIUM SERPL-SCNC: 138 MMOL/L (ref 136–145)
WBC # BLD AUTO: 6.2 X10*3/UL (ref 4.4–11.3)

## 2024-05-30 PROCEDURE — 1100000001 HC PRIVATE ROOM DAILY

## 2024-05-30 PROCEDURE — 2500000001 HC RX 250 WO HCPCS SELF ADMINISTERED DRUGS (ALT 637 FOR MEDICARE OP): Performed by: SPECIALIST

## 2024-05-30 PROCEDURE — 36415 COLL VENOUS BLD VENIPUNCTURE: CPT | Performed by: INTERNAL MEDICINE

## 2024-05-30 PROCEDURE — 87493 C DIFF AMPLIFIED PROBE: CPT | Mod: AHULAB | Performed by: SPECIALIST

## 2024-05-30 PROCEDURE — 85027 COMPLETE CBC AUTOMATED: CPT | Performed by: INTERNAL MEDICINE

## 2024-05-30 PROCEDURE — 99231 SBSQ HOSP IP/OBS SF/LOW 25: CPT | Performed by: INTERNAL MEDICINE

## 2024-05-30 PROCEDURE — 84100 ASSAY OF PHOSPHORUS: CPT | Performed by: INTERNAL MEDICINE

## 2024-05-30 PROCEDURE — 2500000002 HC RX 250 W HCPCS SELF ADMINISTERED DRUGS (ALT 637 FOR MEDICARE OP, ALT 636 FOR OP/ED): Performed by: SPECIALIST

## 2024-05-30 PROCEDURE — 82947 ASSAY GLUCOSE BLOOD QUANT: CPT

## 2024-05-30 RX ADMIN — INSULIN LISPRO 1 UNITS: 100 INJECTION, SOLUTION INTRAVENOUS; SUBCUTANEOUS at 18:24

## 2024-05-30 RX ADMIN — SEVELAMER CARBONATE 2400 MG: 800 TABLET, FILM COATED ORAL at 13:15

## 2024-05-30 RX ADMIN — Medication 1 CAPSULE: at 09:12

## 2024-05-30 RX ADMIN — INSULIN LISPRO 1 UNITS: 100 INJECTION, SOLUTION INTRAVENOUS; SUBCUTANEOUS at 14:34

## 2024-05-30 RX ADMIN — PANTOPRAZOLE SODIUM 40 MG: 40 TABLET, DELAYED RELEASE ORAL at 06:14

## 2024-05-30 RX ADMIN — INSULIN GLARGINE 2 UNITS: 100 INJECTION, SOLUTION SUBCUTANEOUS at 22:27

## 2024-05-30 RX ADMIN — LEVOTHYROXINE SODIUM 125 MCG: 125 TABLET ORAL at 06:14

## 2024-05-30 RX ADMIN — LINEZOLID 600 MG: 600 TABLET, FILM COATED ORAL at 09:12

## 2024-05-30 RX ADMIN — LINEZOLID 600 MG: 600 TABLET, FILM COATED ORAL at 21:57

## 2024-05-30 ASSESSMENT — COGNITIVE AND FUNCTIONAL STATUS - GENERAL
MOVING TO AND FROM BED TO CHAIR: TOTAL
DAILY ACTIVITIY SCORE: 13
DRESSING REGULAR UPPER BODY CLOTHING: A LOT
EATING MEALS: A LITTLE
TOILETING: A LOT
MOVING TO AND FROM BED TO CHAIR: TOTAL
EATING MEALS: A LITTLE
TURNING FROM BACK TO SIDE WHILE IN FLAT BAD: A LOT
WALKING IN HOSPITAL ROOM: TOTAL
MOVING FROM LYING ON BACK TO SITTING ON SIDE OF FLAT BED WITH BEDRAILS: A LOT
PERSONAL GROOMING: A LOT
WALKING IN HOSPITAL ROOM: TOTAL
DRESSING REGULAR UPPER BODY CLOTHING: A LOT
MOVING FROM LYING ON BACK TO SITTING ON SIDE OF FLAT BED WITH BEDRAILS: A LOT
TOILETING: A LOT
CLIMB 3 TO 5 STEPS WITH RAILING: TOTAL
DRESSING REGULAR LOWER BODY CLOTHING: A LOT
STANDING UP FROM CHAIR USING ARMS: TOTAL
PERSONAL GROOMING: A LOT
STANDING UP FROM CHAIR USING ARMS: TOTAL
CLIMB 3 TO 5 STEPS WITH RAILING: TOTAL
DAILY ACTIVITIY SCORE: 13
TOILETING: A LOT
MOVING FROM LYING ON BACK TO SITTING ON SIDE OF FLAT BED WITH BEDRAILS: A LOT
TURNING FROM BACK TO SIDE WHILE IN FLAT BAD: A LOT
TOILETING: A LOT
STANDING UP FROM CHAIR USING ARMS: TOTAL
CLIMB 3 TO 5 STEPS WITH RAILING: TOTAL
MOVING TO AND FROM BED TO CHAIR: TOTAL
MOBILITY SCORE: 8
TURNING FROM BACK TO SIDE WHILE IN FLAT BAD: A LOT
MOVING FROM LYING ON BACK TO SITTING ON SIDE OF FLAT BED WITH BEDRAILS: A LOT
MOVING TO AND FROM BED TO CHAIR: TOTAL
WALKING IN HOSPITAL ROOM: TOTAL
DRESSING REGULAR LOWER BODY CLOTHING: A LOT
CLIMB 3 TO 5 STEPS WITH RAILING: TOTAL
MOVING FROM LYING ON BACK TO SITTING ON SIDE OF FLAT BED WITH BEDRAILS: A LOT
MOVING FROM LYING ON BACK TO SITTING ON SIDE OF FLAT BED WITH BEDRAILS: A LOT
DAILY ACTIVITIY SCORE: 13
DRESSING REGULAR LOWER BODY CLOTHING: A LOT
TURNING FROM BACK TO SIDE WHILE IN FLAT BAD: A LOT
EATING MEALS: A LITTLE
CLIMB 3 TO 5 STEPS WITH RAILING: TOTAL
PERSONAL GROOMING: A LOT
HELP NEEDED FOR BATHING: A LOT
TURNING FROM BACK TO SIDE WHILE IN FLAT BAD: A LOT
TURNING FROM BACK TO SIDE WHILE IN FLAT BAD: A LOT
MOBILITY SCORE: 8
DRESSING REGULAR UPPER BODY CLOTHING: A LOT
MOVING FROM LYING ON BACK TO SITTING ON SIDE OF FLAT BED WITH BEDRAILS: A LOT
MOVING TO AND FROM BED TO CHAIR: TOTAL
TOILETING: A LOT
HELP NEEDED FOR BATHING: A LOT
PERSONAL GROOMING: A LOT
DRESSING REGULAR UPPER BODY CLOTHING: A LOT
HELP NEEDED FOR BATHING: A LOT
CLIMB 3 TO 5 STEPS WITH RAILING: TOTAL
EATING MEALS: A LITTLE
WALKING IN HOSPITAL ROOM: TOTAL
MOVING TO AND FROM BED TO CHAIR: TOTAL
MOBILITY SCORE: 8
DRESSING REGULAR UPPER BODY CLOTHING: A LOT
STANDING UP FROM CHAIR USING ARMS: TOTAL
STANDING UP FROM CHAIR USING ARMS: TOTAL
HELP NEEDED FOR BATHING: A LOT
TOILETING: A LOT
HELP NEEDED FOR BATHING: A LOT
WALKING IN HOSPITAL ROOM: TOTAL
MOVING TO AND FROM BED TO CHAIR: TOTAL
PERSONAL GROOMING: A LOT
EATING MEALS: A LITTLE
HELP NEEDED FOR BATHING: A LOT
DRESSING REGULAR UPPER BODY CLOTHING: A LOT
MOBILITY SCORE: 8
WALKING IN HOSPITAL ROOM: TOTAL
DRESSING REGULAR LOWER BODY CLOTHING: A LOT
PERSONAL GROOMING: A LOT
STANDING UP FROM CHAIR USING ARMS: TOTAL
STANDING UP FROM CHAIR USING ARMS: TOTAL
DRESSING REGULAR LOWER BODY CLOTHING: A LOT
EATING MEALS: A LITTLE
CLIMB 3 TO 5 STEPS WITH RAILING: TOTAL
TURNING FROM BACK TO SIDE WHILE IN FLAT BAD: A LOT
DRESSING REGULAR LOWER BODY CLOTHING: A LOT
WALKING IN HOSPITAL ROOM: TOTAL

## 2024-05-30 ASSESSMENT — PAIN SCALES - GENERAL
PAINLEVEL_OUTOF10: 0 - NO PAIN
PAINLEVEL_OUTOF10: 0 - NO PAIN

## 2024-05-30 ASSESSMENT — PAIN - FUNCTIONAL ASSESSMENT: PAIN_FUNCTIONAL_ASSESSMENT: 0-10

## 2024-05-30 NOTE — PROGRESS NOTES
Edwar Hemphill is a 39 y.o. male on day 1 of admission presenting with Pneumonia of both lungs due to infectious organism, unspecified part of lung.      Subjective   Diarrhea 3 tiimes today        Objective     Last Recorded Vitals  BP 98/60   Pulse 71   Temp 36.7 °C (98 °F)   Resp 18   Wt 68.5 kg (151 lb)   SpO2 94%   Intake/Output last 3 Shifts:    Intake/Output Summary (Last 24 hours) at 5/29/2024 2050  Last data filed at 5/29/2024 2029  Gross per 24 hour   Intake 1350 ml   Output 900 ml   Net 450 ml       Admission Weight  Weight: 68.5 kg (151 lb) (05/28/24 0316)    Daily Weight  05/29/24 : 68.5 kg (151 lb)    Image Results  ECG 12 Lead  Normal sinus rhythm  Nonspecific T wave abnormality  Prolonged QT  Abnormal ECG  When compared with ECG of 04-MAY-2024 10:11,  Sinus rhythm has replaced Atrial fibrillation  Vent. rate has decreased BY  59 BPM  ST no longer elevated in Inferior leads  Nonspecific T wave abnormality no longer evident in Inferior leads  Nonspecific T wave abnormality now evident in Anterior leads  See ED provider note for full interpretation and clinical correlation  Confirmed by Essie Wright ([id number]) on 5/28/2024 11:11:48 AM  XR chest 1 view  Narrative: Interpreted By:  Gigi Acevedo,   STUDY:  XR CHEST 1 VIEW;  5/28/2024 3:58 am      INDICATION:  Signs/Symptoms: Hypoxia.      COMPARISON:  04/30/2024, 03/31/2024      ACCESSION NUMBER(S):  [id number]      ORDERING CLINICIAN:  ADAL STEWART      FINDINGS:          Normal heart size. Similar small bilateral pleural effusions. New  patchy left mid lung and right suprahilar airspace opacities when  compared to 04/30/2024. Partially visualized lower extremity approach  central venous catheter with the tip projected over the right atrium.  Mild diffuse interstitial prominence. No pneumothorax. Normal heart  size. Severe osteopenia. No acute osseous abnormality.      Impression: 1. New patchy left mid lung and right suprahilar airspace  opacities  when compared to 04/30/2024. Consider pneumonia.  2. Mild diffuse interstitial prominence, likely interstitial edema.  3. Similar small bilateral pleural effusions.      Signed by: Gigi Acevedo 5/28/2024 4:04 AM  Dictation workstation:   [id number]      PHYSICAL EXAM  NEUROLOGICAL: No abnormal movements, no changes from before  HEENT: Head atraumatic, perrl,eomi, no oral lesions, no ear rash   NECK: Supple, no ln, jvp flat, thyroid palp  HEART: S1, S2, no added sound  LUNGS: dec a/e  EXTREMITIES: no edema  ABDOMEN: Soft, nontender, bowel sound positive, no organomegaly  SKIN: No change  EYES: No changes, perrl, eomi,   ENT: No change    JOINT: No change  Relevant Results               Assessment/Plan        This patient has a central line   Reason for the central line remaining today? Dialysis/Hemapheresis            Principal Problem:    Pneumonia of both lungs due to infectious organism, unspecified part of lung    Diarrhea --send stool for c diff   Esrd  Anemia  T2dm  Pvd        Malnutrition Diagnosis Status: New  Malnutrition Diagnosis: Severe malnutrition related to chronic disease or condition  As Evidenced by: 18% weight loss over 8 months, intake <75% meals for > 1 month.  I agree with the dietitian's malnutrition diagnosis.     t32    Mauricio Estrada MD

## 2024-05-30 NOTE — PROGRESS NOTES
"Edwar Hemphill is a 39 y.o. male on day 2 of admission presenting with Pneumonia of both lungs due to infectious organism, unspecified part of lung.      Subjective   Gradually doing better with no acute problems or complaints.  Hemodialysis scheduled for tomorrow in his room.       Objective        Vitals 24HR  Heart Rate:  [69-84]   Temp:  [35.9 °C (96.6 °F)-36.8 °C (98.3 °F)]   Resp:  [18]   BP: ()/(60-81)   Height:  [175.3 cm (5' 9.02\")]   Weight:  [68.5 kg (151 lb)]   SpO2:  [93 %-97 %]     Intake/Output last 3 Shifts:    Intake/Output Summary (Last 24 hours) at 5/30/2024 1141  Last data filed at 5/30/2024 1016  Gross per 24 hour   Intake 1190 ml   Output 900 ml   Net 290 ml       Physical Exam      Appearance: Awake alert no distress  Head and ENT; normocephalic/atraumatic/upper neck/no JVD  Lungs; bilateral lower lung crackles and rhonchi  Heart: Irregular/irregular rhythm  Abdomen; soft no tenderness  Extremities right upper extremity amputation  Right femoral tunneled dialysis catheter noted.                 Relevant Results     Results from last 7 days   Lab Units 05/30/24  0619 05/28/24  0416   WBC AUTO x10*3/uL 6.2 9.7   HEMOGLOBIN g/dL 7.6* 8.4*   HEMATOCRIT % 25.4* 27.1*   PLATELETS AUTO x10*3/uL 185 235      Results from last 7 days   Lab Units 05/30/24  0619 05/28/24  0416 05/25/24  0612   SODIUM mmol/L 138 136 132*   POTASSIUM mmol/L 3.7 4.3 4.5   CHLORIDE mmol/L 101 98 96*   CO2 mmol/L 27 23 17*   BUN mg/dL 11 19 17   CREATININE mg/dL 4.25* 6.01* 5.17*   GLUCOSE mg/dL 98 146* 209*   CALCIUM mg/dL 8.8 8.8 8.7        Current Facility-Administered Medications:     acetaminophen (Tylenol) tablet 650 mg, 650 mg, oral, q4h PRN, Maurciio Estrada MD    B complex-vitamin C-folic acid (Nephrocaps) capsule 1 capsule, 1 capsule, oral, Daily, Mauricio Estrada MD, 1 capsule at 05/30/24 0912    dextrose 50 % injection 12.5 g, 12.5 g, intravenous, q15 min PRN, Mauricio Estrada MD    dextrose 50 % injection 25 g, " 25 g, intravenous, q15 min PRN, Mauricio Estrada MD    [START ON 5/31/2024] epoetin tyson-epbx (Retacrit) injection 6,000 Units, 6,000 Units, subcutaneous, Every Mon/Wed/Fri, Dori Flores, PharmBRODIE    glucagon (Glucagen) injection 1 mg, 1 mg, intramuscular, q15 min PRN, Mauricio Estrada MD    glucagon (Glucagen) injection 1 mg, 1 mg, intramuscular, q15 min PRN, Mauricio Estrada MD    heparin 1,000 unit/mL injection 2,400 Units, 2,400 Units, intra-catheter, After Dialysis, Zeeshan Gonzalez MD, 2,400 Units at 05/29/24 1230    heparin 1,000 unit/mL injection 2,400 Units, 2,400 Units, intra-catheter, After Dialysis, Zeeshan Gonzalez MD, 2,400 Units at 05/29/24 1230    insulin glargine (Lantus) injection 2 Units, 2 Units, subcutaneous, Nightly, Mauricio Estrada MD, 2 Units at 05/29/24 2248    insulin lispro (HumaLOG) injection 0-5 Units, 0-5 Units, subcutaneous, TID, Mauricio Estrada MD, 1 Units at 05/28/24 1712    levothyroxine (Synthroid, Levoxyl) tablet 125 mcg, 125 mcg, oral, Daily before breakfast, Mauricio Estrada MD, 125 mcg at 05/30/24 0614    linezolid (Zyvox) tablet 600 mg, 600 mg, oral, BID, Mauricio Estrada MD, 600 mg at 05/30/24 0912    loperamide (Imodium) capsule 4 mg, 4 mg, oral, q2h PRN, Mauricio Estrada MD    midodrine (Proamatine) tablet 5 mg, 5 mg, oral, Before Dialysis, Zeeshan Gonzalez MD, 5 mg at 05/29/24 0906    nitroglycerin (Nitrostat) SL tablet 0.4 mg, 0.4 mg, sublingual, q5 min PRN, Mauricio Estrada MD    oxyCODONE-acetaminophen (Percocet) 5-325 mg per tablet 1 tablet, 1 tablet, oral, q6h PRN, Mauricio Estrada MD, 1 tablet at 05/29/24 1206    pantoprazole (ProtoNix) EC tablet 40 mg, 40 mg, oral, Daily before breakfast, Mauricio Estrada MD, 40 mg at 05/30/24 0614    sevelamer carbonate (Renvela) tablet 2,400 mg, 2,400 mg, oral, TID, Mauricio Estrada MD, 2,400 mg at 05/29/24 0856    simethicone (Mylicon) chewable tablet 80 mg, 80 mg, oral, q8h PRN, Mauricio Estrada MD    vancomycin (Vancocin) pharmacy to  dose - pharmacy monitoring, , miscellaneous, Daily PRN, Mat Jauregui MD           Assessment/Plan        1.  ESKD on hemodialysis, right femoral tunneled dialysis catheter recently placed in IR  Hemodialysis will be scheduled for tomorrow Friday.  2.  Diabetes mellitus continue current management  3.  Hypertension, continue current management   4.  Status post multiple admissions for sepsis and removal of dialysis catheters  5.  SIRS will follow all the consultants infectious disease  6.  Admitted with bilateral infiltrates probable pneumonia/CHF     Hemodialysis scheduled for tomorrow for 3 hours in his room.            Principal Problem:    Pneumonia of both lungs due to infectious organism, unspecified part of lung       I spent 35 minutes in the professional and overall care of this patient.      Gracie Hollins MD

## 2024-05-30 NOTE — PROGRESS NOTES
05/30/24 0910   Discharge Planning   Patient expects to be discharged to: [facility]     Patient is not med ready on IV vanc for bilateral PNA, blood cx and HD swab cx are pending, per notes ID following for results, once med ready patient plan is to discharge back to [facility] resident.

## 2024-05-30 NOTE — CARE PLAN
The patient's goals for the shift include +    The clinical goals for the shift include pt will remain free from injury this shift      Problem: Safety  Goal: Patient will be injury free during hospitalization  Outcome: Progressing  Goal: I will remain free of falls  Outcome: Progressing     Problem: Pain  Goal: My pain/discomfort is manageable  Outcome: Progressing

## 2024-05-30 NOTE — CARE PLAN
Problem: Skin  Goal: Decreased wound size/increased tissue granulation at next dressing change  5/30/2024 1141 by Carla Malagon RN  Outcome: Progressing  5/30/2024 1137 by Carla Malagon RN  Outcome: Progressing  Goal: Participates in plan/prevention/treatment measures  5/30/2024 1141 by Carla Malagon RN  Outcome: Progressing  5/30/2024 1137 by Carla Malagon RN  Outcome: Progressing  Goal: Prevent/manage excess moisture  5/30/2024 1141 by Carla Malagon RN  Outcome: Progressing  5/30/2024 1137 by Carla Malagon RN  Outcome: Progressing  Goal: Prevent/minimize sheer/friction injuries  5/30/2024 1141 by Carla Malagon RN  Outcome: Progressing  5/30/2024 1137 by Carla Malagon RN  Outcome: Progressing  Goal: Promote/optimize nutrition  5/30/2024 1141 by Carla Malagon RN  Outcome: Progressing  5/30/2024 1137 by Carla Malagon RN  Outcome: Progressing  Goal: Promote skin healing  5/30/2024 1141 by Carla Malagon RN  Outcome: Progressing  5/30/2024 1137 by Carla Malagon RN  Outcome: Progressing   The patient's goals for the shift include +    The clinical goals for the shift include Pt will remain free from falls

## 2024-05-30 NOTE — CARE PLAN
The patient's goals for the shift include +    The clinical goals for the shift include pt will remain free from injury this shift      Problem: Pain  Goal: My pain/discomfort is manageable  5/30/2024 0258 by Lore Mckenzie RN  Outcome: Progressing  5/30/2024 0257 by Lore Mckenzie, RN  Outcome: Progressing     Problem: Safety  Goal: Patient will be injury free during hospitalization  5/30/2024 0258 by Lore cMkenzie, RN  Outcome: Progressing  5/30/2024 0257 by Lore Mckenzie RN  Outcome: Progressing  Goal: I will remain free of falls  5/30/2024 0258 by Lore Mckenzie, RN  Outcome: Progressing  5/30/2024 0257 by Lore Mckenzie RN  Outcome: Progressing     Problem: Daily Care  Goal: Daily care needs are met  5/30/2024 0258 by Lore Mckenzie, RN  Outcome: Progressing  5/30/2024 0257 by Lore Mckenzie RN  Outcome: Progressing     Problem: Psychosocial Needs  Goal: Demonstrates ability to cope with hospitalization/illness  5/30/2024 0258 by Lore Mckenzie, RN  Outcome: Progressing  5/30/2024 0257 by Lore Mckenzie RN  Outcome: Progressing  Goal: Collaborate with me, my family, and caregiver to identify my specific goals  5/30/2024 0258 by Lore Mckenzie, RN  Outcome: Progressing  5/30/2024 0257 by Lore Mckenzie RN  Outcome: Progressing     Problem: Fall/Injury  Goal: Not fall by end of shift  5/30/2024 0258 by Lore Mckenzie, RN  Outcome: Progressing  5/30/2024 0257 by Lore Mckenzie RN  Outcome: Progressing  Goal: Be free from injury by end of the shift  5/30/2024 0258 by Lore Mckenzie, RN  Outcome: Progressing  5/30/2024 0257 by Lore Mckenzie RN  Outcome: Progressing  Goal: Verbalize understanding of personal risk factors for fall in the hospital  5/30/2024 0258 by Lore Mckenzie, RN  Outcome: Progressing  5/30/2024 0257 by Lore Mckenzie, RN  Outcome: Progressing  Goal: Verbalize understanding of risk factor reduction measures to prevent injury from  fall in the home  5/30/2024 0258 by Lore Mckenzie RN  Outcome: Progressing  5/30/2024 0257 by Lore Mckenzie RN  Outcome: Progressing  Goal: Use assistive devices by end of the shift  5/30/2024 0258 by Lore Mckenzie RN  Outcome: Progressing  5/30/2024 0257 by Lore Mckenzie RN  Outcome: Progressing  Goal: Pace activities to prevent fatigue by end of the shift  5/30/2024 0258 by Lore Mckenzie, RN  Outcome: Progressing  5/30/2024 0257 by Lore Mckenzie RN  Outcome: Progressing     Problem: Skin  Goal: Decreased wound size/increased tissue granulation at next dressing change  5/30/2024 0258 by Lore Mckenzie RN  Outcome: Progressing  Flowsheets (Taken 5/30/2024 0258)  Decreased wound size/increased tissue granulation at next dressing change:   Protective dressings over bony prominences   Promote sleep for wound healing  5/30/2024 0257 by Lore Mckenzie RN  Outcome: Progressing  Goal: Participates in plan/prevention/treatment measures  5/30/2024 0258 by Lore Mckenzie RN  Outcome: Progressing  Flowsheets (Taken 5/30/2024 0258)  Participates in plan/prevention/treatment measures: Elevate heels  5/30/2024 0257 by Lore Mckenzie RN  Outcome: Progressing  Goal: Prevent/manage excess moisture  5/30/2024 0258 by Lore Mckenzie RN  Outcome: Progressing  Flowsheets (Taken 5/30/2024 0258)  Prevent/manage excess moisture:   Cleanse incontinence/protect with barrier cream   Moisturize dry skin  5/30/2024 0257 by Lore Mckenzie RN  Outcome: Progressing  Goal: Prevent/minimize sheer/friction injuries  5/30/2024 0258 by Lore Mckenzie RN  Outcome: Progressing  Flowsheets (Taken 5/30/2024 0258)  Prevent/minimize sheer/friction injuries:   Use pull sheet   HOB 30 degrees or less   Turn/reposition every 2 hours/use positioning/transfer devices  5/30/2024 0257 by Lore Mckenzie RN  Outcome: Progressing  Goal: Promote/optimize nutrition  5/30/2024 0258 by Lore Mckenzie,  RN  Outcome: Progressing  Flowsheets (Taken 5/30/2024 0258)  Promote/optimize nutrition:   Consume > 50% meals/supplements   Monitor/record intake including meals  5/30/2024 0257 by Lore Mckenzie RN  Outcome: Progressing  Goal: Promote skin healing  5/30/2024 0258 by Lore Mckenzie RN  Outcome: Progressing  Flowsheets (Taken 5/30/2024 0258)  Promote skin healing:   Assess skin/pad under line(s)/device(s)   Protective dressings over bony prominences   Turn/reposition every 2 hours/use positioning/transfer devices  5/30/2024 0257 by Lore Mckenzie RN  Outcome: Progressing

## 2024-05-30 NOTE — PROGRESS NOTES
"  INFECTIOUS DISEASE DAILY PROGRESS NOTE    SUBJECTIVE:    No overnight issues. He feels a bit better. Still on 3L NC but says breathing is not as bad. Denies cough, congestion, sputum production. Blood cx pending.    OBJECTIVE:  VITALS (Last 24 Hours)  /72 (Patient Position: Lying)   Pulse 84   Temp 36.7 °C (98.1 °F)   Resp 18   Ht 1.753 m (5' 9.02\")   Wt 68.5 kg (151 lb)   SpO2 94%   BMI 22.29 kg/m²     PHYSICAL EXAM:  Gen - in bed  Lungs- crackles both lungs, no wheezing  Abd - soft, no ttp  RLE - s/p AKA  RUE - s/p amputation  Skin - no rash     ABX: IV Vancomycin and PO Linezolid    LABS:  Lab Results   Component Value Date    WBC 6.2 05/30/2024    HGB 7.6 (L) 05/30/2024    HCT 25.4 (L) 05/30/2024    MCV 89 05/30/2024     05/30/2024     Lab Results   Component Value Date    GLUCOSE 98 05/30/2024    CALCIUM 8.8 05/30/2024     05/30/2024    K 3.7 05/30/2024    CO2 27 05/30/2024     05/30/2024    BUN 11 05/30/2024    CREATININE 4.25 (H) 05/30/2024     Results from last 72 hours   Lab Units 05/30/24  0619 05/28/24  0416   ALK PHOS U/L  --  124*   BILIRUBIN TOTAL mg/dL  --  0.8   PROTEIN TOTAL g/dL  --  6.8   ALT U/L  --  5*   AST U/L  --  10   ALBUMIN g/dL 2.3* 2.5*     Estimated Creatinine Clearance: 22.4 mL/min (A) (by C-G formula based on SCr of 4.25 mg/dL (H)).    ASSESSMENT/PLAN:     Recent CLABSI (HD cath POA) due to MRSA - finally just cleared bacteremia with combination therapy Dapto/Ceftaroline and 2x line holidays. TTE/POLI without endocarditis. Had been on IV Daptomycin/Linezolid as outpatient only 2-3 days.  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 Has peripheral eosinophilia concerning for this and I reviewed his CXR images - there are bilateral infiltrates. I doubt he would be volume overloaded as has had plenty of dialysis.     IV Vancomycin and continue on PO Linezolid still.    Blood cx and swab from the HD cath site are pending.    We may " need to remove the line but finding a new area for access if going to be a problem.     Will follow. Thanks!    Mat Jauregui MD  [facility]  Office #[phone]

## 2024-05-30 NOTE — PROGRESS NOTES
Edwar Hemphill is a 39 y.o. male on day 2 of admission presenting with Pneumonia of both lungs due to infectious organism, unspecified part of lung.      Subjective   Feels ok  No diarrhea        Objective     Last Recorded Vitals  /72 (Patient Position: Lying)   Pulse 84   Temp 36.7 °C (98.1 °F)   Resp 18   Wt 68.5 kg (151 lb)   SpO2 94%   Intake/Output last 3 Shifts:    Intake/Output Summary (Last 24 hours) at 5/30/2024 1008  Last data filed at 5/29/2024 2029  Gross per 24 hour   Intake 950 ml   Output 900 ml   Net 50 ml       Admission Weight  Weight: 68.5 kg (151 lb) (05/28/24 0316)    Daily Weight  05/29/24 : 68.5 kg (151 lb)    Image Results  ECG 12 Lead  Normal sinus rhythm  Nonspecific T wave abnormality  Prolonged QT  Abnormal ECG  When compared with ECG of 04-MAY-2024 10:11,  Sinus rhythm has replaced Atrial fibrillation  Vent. rate has decreased BY  59 BPM  ST no longer elevated in Inferior leads  Nonspecific T wave abnormality no longer evident in Inferior leads  Nonspecific T wave abnormality now evident in Anterior leads  See ED provider note for full interpretation and clinical correlation  Confirmed by Essie Wright ([id number]) on 5/28/2024 11:11:48 AM  XR chest 1 view  Narrative: Interpreted By:  Gigi Acevedo,   STUDY:  XR CHEST 1 VIEW;  5/28/2024 3:58 am      INDICATION:  Signs/Symptoms: Hypoxia.      COMPARISON:  04/30/2024, 03/31/2024      ACCESSION NUMBER(S):  [id number]      ORDERING CLINICIAN:  ADAL STEWART      FINDINGS:          Normal heart size. Similar small bilateral pleural effusions. New  patchy left mid lung and right suprahilar airspace opacities when  compared to 04/30/2024. Partially visualized lower extremity approach  central venous catheter with the tip projected over the right atrium.  Mild diffuse interstitial prominence. No pneumothorax. Normal heart  size. Severe osteopenia. No acute osseous abnormality.      Impression: 1. New patchy left mid lung and right  suprahilar airspace opacities  when compared to 04/30/2024. Consider pneumonia.  2. Mild diffuse interstitial prominence, likely interstitial edema.  3. Similar small bilateral pleural effusions.      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  I agree with the dietitian's malnutrition diagnosis.         Mauricio Estrada MD

## 2024-05-30 NOTE — PROGRESS NOTES
"Edwar Hemphill is a 39 y.o. male on day 2 of admission presenting with Pneumonia of both lungs due to infectious organism, unspecified part of lung.    Subjective   No new complaints     Scheduled medications  B complex-vitamin C-folic acid, 1 capsule, oral, Daily  [START ON 5/31/2024] epoetin tyson or biosimilar, 6,000 Units, subcutaneous, Every Mon/Wed/Fri  heparin, 2,400 Units, intra-catheter, After Dialysis  heparin, 2,400 Units, intra-catheter, After Dialysis  insulin glargine, 2 Units, subcutaneous, Nightly  insulin lispro, 0-5 Units, subcutaneous, TID  levothyroxine, 125 mcg, oral, Daily before breakfast  linezolid, 600 mg, oral, BID  midodrine, 5 mg, oral, Before Dialysis  pantoprazole, 40 mg, oral, Daily before breakfast  sevelamer carbonate, 2,400 mg, oral, TID      Objective   Physical Exam  Constitutional:       General: He is not in acute distress.  Neurological:      Mental Status: He is alert.         Last Recorded Vitals  Blood pressure 132/69, pulse 84, temperature 36.7 °C (98.1 °F), temperature source Temporal, resp. rate 18, height 1.753 m (5' 9.02\"), weight 68.5 kg (151 lb), SpO2 94%.  Intake/Output last 3 Shifts:  I/O last 3 completed shifts:  In: 1350 (19.7 mL/kg) [I.V.:800 (11.7 mL/kg); Other:400; IV Piggyback:150]  Out: 900 (13.1 mL/kg) [Other:900]  Weight: 68.5 kg     Relevant Results  Results from last 7 days   Lab Units 05/30/24  0619 05/29/24  2153 05/29/24  1502 05/29/24  1143 05/29/24  0801 05/28/24  2021 05/28/24  0800 05/28/24  0416 05/25/24  0739 05/25/24  0612 05/24/24  0828 05/24/24  0651   POCT GLUCOSE mg/dL  --  201* 144* 137* 145* 213*   < >  --    < >  --    < >  --    GLUCOSE mg/dL 98  --   --   --   --   --   --  146*  --  209*  --  226*    < > = values in this interval not displayed.      Latest Reference Range & Units 05/30/24 06:19   GLUCOSE 74 - 99 mg/dL 98   SODIUM 136 - 145 mmol/L 138   POTASSIUM 3.5 - 5.3 mmol/L 3.7   CHLORIDE 98 - 107 mmol/L 101   Bicarbonate 21 - 32 " mmol/L 27   Anion Gap 10 - 20 mmol/L 14   Blood Urea Nitrogen 6 - 23 mg/dL 11   Creatinine 0.50 - 1.30 mg/dL 4.25 (H)   EGFR >60 mL/min/1.73m*2 17 (L)   Calcium 8.6 - 10.3 mg/dL 8.8   PHOSPHORUS 2.5 - 4.9 mg/dL 3.1   Albumin 3.4 - 5.0 g/dL 2.3 (L)       Assessment/Plan   Principal Problem:    Pneumonia of both lungs due to infectious organism, unspecified part of lung    TYPE 2 DIABETES MELLITUS  LONG TERM CURRENT INSULIN USE  Low insulin requirement        RECOMMENDATIONS  Continue insulin glargine 2 units at bedtime  Insulin lispro scale QA      Mike Santana MD

## 2024-05-31 ENCOUNTER — APPOINTMENT (OUTPATIENT)
Dept: DIALYSIS | Facility: HOSPITAL | Age: 39
End: 2024-05-31
Payer: COMMERCIAL

## 2024-05-31 LAB
C DIF TOX TCDA+TCDB STL QL NAA+PROBE: NOT DETECTED
GLUCOSE BLD MANUAL STRIP-MCNC: 145 MG/DL (ref 74–99)
GLUCOSE BLD MANUAL STRIP-MCNC: 153 MG/DL (ref 74–99)
GLUCOSE BLD MANUAL STRIP-MCNC: 179 MG/DL (ref 74–99)
HOLD SPECIMEN: NORMAL
HOLD SPECIMEN: NORMAL
VANCOMYCIN SERPL-MCNC: 25.8 UG/ML (ref 5–20)

## 2024-05-31 PROCEDURE — 82947 ASSAY GLUCOSE BLOOD QUANT: CPT

## 2024-05-31 PROCEDURE — 6350000001 HC RX 635 EPOETIN >10,000 UNITS: Mod: JW | Performed by: PHARMACIST

## 2024-05-31 PROCEDURE — 2500000001 HC RX 250 WO HCPCS SELF ADMINISTERED DRUGS (ALT 637 FOR MEDICARE OP): Performed by: SPECIALIST

## 2024-05-31 PROCEDURE — 1100000001 HC PRIVATE ROOM DAILY

## 2024-05-31 PROCEDURE — 36415 COLL VENOUS BLD VENIPUNCTURE: CPT | Performed by: PHARMACIST

## 2024-05-31 PROCEDURE — 8010000001 HC DIALYSIS - HEMODIALYSIS PER DAY

## 2024-05-31 PROCEDURE — 99231 SBSQ HOSP IP/OBS SF/LOW 25: CPT | Performed by: INTERNAL MEDICINE

## 2024-05-31 PROCEDURE — 2500000004 HC RX 250 GENERAL PHARMACY W/ HCPCS (ALT 636 FOR OP/ED): Performed by: INTERNAL MEDICINE

## 2024-05-31 PROCEDURE — 2500000004 HC RX 250 GENERAL PHARMACY W/ HCPCS (ALT 636 FOR OP/ED): Performed by: PHARMACIST

## 2024-05-31 PROCEDURE — 2500000002 HC RX 250 W HCPCS SELF ADMINISTERED DRUGS (ALT 637 FOR MEDICARE OP, ALT 636 FOR OP/ED): Performed by: SPECIALIST

## 2024-05-31 PROCEDURE — 80202 ASSAY OF VANCOMYCIN: CPT | Performed by: PHARMACIST

## 2024-05-31 RX ORDER — VANCOMYCIN HYDROCHLORIDE 500 MG/100ML
500 INJECTION, SOLUTION INTRAVENOUS ONCE
Status: COMPLETED | OUTPATIENT
Start: 2024-05-31 | End: 2024-05-31

## 2024-05-31 RX ADMIN — HEPARIN SODIUM 2400 UNITS: 1000 INJECTION INTRAVENOUS; SUBCUTANEOUS at 11:22

## 2024-05-31 RX ADMIN — SEVELAMER CARBONATE 2400 MG: 800 TABLET, FILM COATED ORAL at 17:35

## 2024-05-31 RX ADMIN — Medication 1 CAPSULE: at 14:07

## 2024-05-31 RX ADMIN — HEPARIN SODIUM 2400 UNITS: 1000 INJECTION INTRAVENOUS; SUBCUTANEOUS at 11:23

## 2024-05-31 RX ADMIN — VANCOMYCIN HYDROCHLORIDE 500 MG: 500 INJECTION, SOLUTION INTRAVENOUS at 18:33

## 2024-05-31 RX ADMIN — EPOETIN ALFA-EPBX 6000 UNITS: 10000 INJECTION, SOLUTION INTRAVENOUS; SUBCUTANEOUS at 06:51

## 2024-05-31 RX ADMIN — LINEZOLID 600 MG: 600 TABLET, FILM COATED ORAL at 14:07

## 2024-05-31 RX ADMIN — INSULIN GLARGINE 2 UNITS: 100 INJECTION, SOLUTION SUBCUTANEOUS at 22:05

## 2024-05-31 RX ADMIN — INSULIN LISPRO 1 UNITS: 100 INJECTION, SOLUTION INTRAVENOUS; SUBCUTANEOUS at 14:13

## 2024-05-31 RX ADMIN — SEVELAMER CARBONATE 2400 MG: 800 TABLET, FILM COATED ORAL at 14:06

## 2024-05-31 RX ADMIN — LINEZOLID 600 MG: 600 TABLET, FILM COATED ORAL at 22:05

## 2024-05-31 ASSESSMENT — PAIN SCALES - GENERAL
PAINLEVEL_OUTOF10: 0 - NO PAIN
PAINLEVEL_OUTOF10: 0 - NO PAIN

## 2024-05-31 ASSESSMENT — PAIN - FUNCTIONAL ASSESSMENT
PAIN_FUNCTIONAL_ASSESSMENT: 0-10
PAIN_FUNCTIONAL_ASSESSMENT: NO/DENIES PAIN

## 2024-05-31 NOTE — NURSING NOTE
Report from Sending RN:    Report From: Mary Najera  Recent Surgery of Procedure: No  Baseline Level of Consciousness (LOC): A& OX's 3  Oxygen Use: No  Type: room air  Diabetic: Yes  Last BP Med Given Day of Dialysis: see EMAR  Last Pain Med Given: see EMAR  Lab Tests to be Obtained with Dialysis: No  Blood Transfusion to be Given During Dialysis: No  Available IV Access: Yes  Medications to be Administered During Dialysis: No  Continuous IV Infusion Running: No  Restraints on Currently or in the Last 24 Hours: No  Hand-Off Communication: Stable and ready for dialysis  Dialysis Catheter Dressing: will access upon arrioval  Last Dressing Change: will access upon arrival

## 2024-05-31 NOTE — PROGRESS NOTES
Edwar Hemphill is a 39 y.o. male on day 3 of admission presenting with Pneumonia of both lungs due to infectious organism, unspecified part of lung.      Subjective   Patient was seen and evaluated while undergoing hemodialysis he had no complaints or neurologic questions       Objective        Vitals 24HR  Heart Rate:  [79-86]   Temp:  [36.1 °C (97 °F)-36.7 °C (98.1 °F)]   Resp:  [18]   BP: (110-126)/(70-77)   SpO2:  [96 %-98 %]     Intake/Output last 3 Shifts:    Intake/Output Summary (Last 24 hours) at 5/31/2024 1450  Last data filed at 5/31/2024 1114  Gross per 24 hour   Intake 1200 ml   Output 2400 ml   Net -1200 ml       Physical Exam      Appearance: Awake alert no distress  Head and ENT; normocephalic/atraumatic/upper neck/no JVD  Lungs; bilateral lower lung crackles and rhonchi  Heart: Irregular/irregular rhythm  Abdomen; soft no tenderness  Extremities right upper extremity amputation    Right femoral tunneled dialysis catheter noted.             Relevant Results     Results from last 7 days   Lab Units 05/30/24  0619 05/28/24  0416   WBC AUTO x10*3/uL 6.2 9.7   HEMOGLOBIN g/dL 7.6* 8.4*   HEMATOCRIT % 25.4* 27.1*   PLATELETS AUTO x10*3/uL 185 235      Results from last 7 days   Lab Units 05/30/24  0619 05/28/24  0416 05/25/24  0612   SODIUM mmol/L 138 136 132*   POTASSIUM mmol/L 3.7 4.3 4.5   CHLORIDE mmol/L 101 98 96*   CO2 mmol/L 27 23 17*   BUN mg/dL 11 19 17   CREATININE mg/dL 4.25* 6.01* 5.17*   GLUCOSE mg/dL 98 146* 209*   CALCIUM mg/dL 8.8 8.8 8.7        Current Facility-Administered Medications:     acetaminophen (Tylenol) tablet 650 mg, 650 mg, oral, q4h PRN, Mauricio Estrada MD    B complex-vitamin C-folic acid (Nephrocaps) capsule 1 capsule, 1 capsule, oral, Daily, Mauricio Estrada MD, 1 capsule at 05/31/24 1407    dextrose 50 % injection 12.5 g, 12.5 g, intravenous, q15 min PRN, Mauricio Estrada MD    dextrose 50 % injection 25 g, 25 g, intravenous, q15 min PRN, Mauricio Estrada MD    [START ON  6/3/2024] epoetin tyson-epbx (Retacrit) injection 6,000 Units, 6,000 Units, subcutaneous, Every Mon/Wed/Fri, Dori Flores, Andry    glucagon (Glucagen) injection 1 mg, 1 mg, intramuscular, q15 min PRN, Mauricio Estrada MD    glucagon (Glucagen) injection 1 mg, 1 mg, intramuscular, q15 min PRN, Mauricio Estrada MD    heparin 1,000 unit/mL injection 2,400 Units, 2,400 Units, intra-catheter, After Dialysis, Zeeshan Gonzalez MD, 2,400 Units at 05/31/24 1122    heparin 1,000 unit/mL injection 2,400 Units, 2,400 Units, intra-catheter, After Dialysis, Zeeshan Gonzalez MD, 2,400 Units at 05/31/24 1123    insulin glargine (Lantus) injection 2 Units, 2 Units, subcutaneous, Nightly, Mauricio Estrada MD, 2 Units at 05/30/24 2227    insulin lispro (HumaLOG) injection 0-5 Units, 0-5 Units, subcutaneous, TID, Mauricio Estrada MD, 1 Units at 05/31/24 1413    levothyroxine (Synthroid, Levoxyl) tablet 125 mcg, 125 mcg, oral, Daily before breakfast, Mauricio Estrada MD, 125 mcg at 05/30/24 0614    linezolid (Zyvox) tablet 600 mg, 600 mg, oral, BID, Mauricio Estrada MD, 600 mg at 05/31/24 1407    loperamide (Imodium) capsule 4 mg, 4 mg, oral, q2h PRN, Mauricio Estrada MD    midodrine (Proamatine) tablet 5 mg, 5 mg, oral, Before Dialysis, Zeeshan Gonzalez MD, 5 mg at 05/29/24 0906    nitroglycerin (Nitrostat) SL tablet 0.4 mg, 0.4 mg, sublingual, q5 min PRN, Mauricio Estrdaa MD    oxyCODONE-acetaminophen (Percocet) 5-325 mg per tablet 1 tablet, 1 tablet, oral, q6h PRN, Mauricio Estrada MD, 1 tablet at 05/29/24 1206    pantoprazole (ProtoNix) EC tablet 40 mg, 40 mg, oral, Daily before breakfast, Mauricio Estrada MD, 40 mg at 05/30/24 0614    sevelamer carbonate (Renvela) tablet 2,400 mg, 2,400 mg, oral, TID, Mauricio Estrada MD, 2,400 mg at 05/31/24 1406    simethicone (Mylicon) chewable tablet 80 mg, 80 mg, oral, q8h PRN, Mauricio Estrada MD    vancomycin (Vancocin) pharmacy to dose - pharmacy monitoring, , miscellaneous, Daily PRN, Mat  MD Juventino    vancomycin in dextrose 5 % (Vancocin) IVPB 500 mg, 500 mg, intravenous, Once, Dori Flores, PharmD           Assessment/Plan      1.  ESKD on hemodialysis, right femoral tunneled dialysis catheter recently placed in IR  Hemodialysis will be scheduled for tomorrow Friday.  2.  Diabetes mellitus continue current management  3.  Hypertension, continue current management   4.  Status post multiple admissions for sepsis and removal of dialysis catheters  5.  SIRS will follow all the consultants infectious disease  6.  Admitted with bilateral infiltrates probable pneumonia/CHF     Hemodialysis scheduled for today for 3 hours .            I spent 35 minutes in the professional and overall care of this patient.      Gracie Hollins MD

## 2024-05-31 NOTE — PROGRESS NOTES
05/31/24 0834   Discharge Planning   Patient expects to be discharged to: [facility]     Per notes patient not med ready, ID following patient remains on IV Vanco, blood cx and HD swab showing no growth day 1, ID still following, will continue to monitor for discharge planning.

## 2024-05-31 NOTE — PROGRESS NOTES
"                                                                                                               '' Infectious Disease Progress Note''        Interval Events:  No new symptoms  Afebrile  Blood culture from 5/29 no growth to date  C.diff PCR is negative      Current antibiotic:  Vancomycin  Zyvox    Physical Exam:  /77   Pulse 79   Temp 36.4 °C (97.5 °F) (Temporal)   Resp 18   Ht 1.753 m (5' 9.02\")   Wt 68.5 kg (151 lb)   SpO2 97%   BMI 22.29 kg/m²   General: NAD, nontoxic appearing  Skin: no new rash  Resp: Bilateral fine crackle  CV:  normal S1/S2, no murmur   Abd: soft, non-tender  Ext: Right AKA, RUE amputation      Lab Results:  Reviewed    Micro:  5/14 blood culture: MRSA  5/17 blood culture: MRSA  5/21 blood culture:  negative  5/29 blood culture: No growth to date    Imaging: reviewed         Assessment:  -Recent MRSA CLABSI: On IV daptomycin/Zyvox as outpatient  -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      Plan/Recommendations:  -Continue IV vancomycin and p.o. Zyvox  -Follow-up blood and HD catheter site swab culture  -Monitoring for antibiotics adverse reaction including diarrhea, rash or neurotoxicity      Please call ID with any concerns or questions.      Sheldon Saleh MD  [facility]  #[phone]      "

## 2024-05-31 NOTE — PROGRESS NOTES
"Edwar Hemphill is a 39 y.o. male on day 3 of admission presenting with Pneumonia of both lungs due to infectious organism, unspecified part of lung.    Subjective   No new complaint     Scheduled medications  B complex-vitamin C-folic acid, 1 capsule, oral, Daily  epoetin tyson or biosimilar, 6,000 Units, subcutaneous, Every Mon/Wed/Fri  heparin, 2,400 Units, intra-catheter, After Dialysis  heparin, 2,400 Units, intra-catheter, After Dialysis  insulin glargine, 2 Units, subcutaneous, Nightly  insulin lispro, 0-5 Units, subcutaneous, TID  levothyroxine, 125 mcg, oral, Daily before breakfast  linezolid, 600 mg, oral, BID  midodrine, 5 mg, oral, Before Dialysis  pantoprazole, 40 mg, oral, Daily before breakfast  sevelamer carbonate, 2,400 mg, oral, TID  vancomycin, 500 mg, intravenous, Once      Objective     Physical Exam  Constitutional:       General: He is not in acute distress.  Neurological:      Mental Status: He is alert.         Last Recorded Vitals  Blood pressure 113/70, pulse 86, temperature 36.1 °C (97 °F), temperature source Temporal, resp. rate 18, height 1.753 m (5' 9.02\"), weight 68.5 kg (151 lb), SpO2 96%.  Intake/Output last 3 Shifts:  I/O last 3 completed shifts:  In: 390 (5.7 mL/kg) [P.O.:240; IV Piggyback:150]  Out: - (0 mL/kg)   Weight: 68.5 kg     Relevant Results  Results from last 7 days   Lab Units 05/30/24  2201 05/30/24  1553 05/30/24  1138 05/30/24  0751 05/30/24  0619 05/29/24  2153 05/28/24  0800 05/28/24  0416 05/25/24  0739 05/25/24  0612   POCT GLUCOSE mg/dL 269* 172* 154* 97  --  201*   < >  --    < >  --    GLUCOSE mg/dL  --   --   --   --  98  --   --  146*  --  209*    < > = values in this interval not displayed.       Assessment/Plan   Principal Problem:    Pneumonia of both lungs due to infectious organism, unspecified part of lung    TYPE 2 DIABETES MELLITUS  LONG TERM CURRENT INSULIN USE  Low insulin requirement        RECOMMENDATIONS  Continue insulin glargine 2 units at " bedtime  Insulin lispro scale QAC           Mike Santana MD

## 2024-05-31 NOTE — PROGRESS NOTES
Vancomycin Dosing by Pharmacy- FOLLOW UP    Edwar Hemphill is a 39 y.o. year old male who Pharmacy has been consulted for vancomycin dosing for line infections. Based on the patient's indication and renal status this patient is being dosed based on a goal pre-HD level of 20-25.     Renal function is currently stable.    Most recent random level: 25.8 mcg/mL    Visit Vitals  /70 (Patient Position: Lying)   Pulse 86   Temp 36.1 °C (97 °F) (Temporal)   Resp 18        Lab Results   Component Value Date    CREATININE 4.25 (H) 2024    CREATININE 6.01 (H) 2024    CREATININE 5.17 (H) 2024    CREATININE 7.14 (H) 2024        Patient weight is as follows:   Vitals:    24 1557   Weight: 68.5 kg (151 lb)       Cultures:  No results found for the encounter in last 14 days.       I/O last 3 completed shifts:  In: 390 (5.7 mL/kg) [P.O.:240; IV Piggyback:150]  Out: - (0 mL/kg)   Weight: 68.5 kg   I/O during current shift:  No intake/output data recorded.    Temp (24hrs), Av.4 °C (97.6 °F), Min:36.1 °C (97 °F), Max:36.7 °C (98.1 °F)      Assessment/Plan    Will give one dose of 500mg x 1 today. Next level TBD  Will continue to monitor renal function daily while on vancomycin and order serum creatinine at least every 48 hours if not already ordered.  Follow for continued vancomycin needs, clinical response, and signs/symptoms of toxicity.       Dori Flores, PharmD

## 2024-05-31 NOTE — POST-PROCEDURE NOTE
Report to Receiving RN:    Report To: Steffanie DAWKINS  Time Report Called: 1126  Hand-Off Communication: Pt removed 2L, /74, HR 74  Complications During Treatment: No  Ultrafiltration Treatment: No  Medications Administered During Dialysis: No  Blood Products Administered During Dialysis: No  Labs Sent During Dialysis: No  Heparin Drip Rate Changes: No  Dialysis Catheter Dressing:Clean and Dry  Last Dressing Change: 5/30/24    Electronic Signatures:   (Signed Toney Fitzpatrick OCDT )   Authored:    (Signed )   Authored:     Last Updated: 11:26 AM by TONEY FITZPATRICK

## 2024-06-01 VITALS
RESPIRATION RATE: 18 BRPM | TEMPERATURE: 97.9 F | HEART RATE: 88 BPM | HEIGHT: 69 IN | OXYGEN SATURATION: 100 % | WEIGHT: 151 LBS | SYSTOLIC BLOOD PRESSURE: 132 MMHG | BODY MASS INDEX: 22.36 KG/M2 | DIASTOLIC BLOOD PRESSURE: 77 MMHG

## 2024-06-01 LAB
GLUCOSE BLD MANUAL STRIP-MCNC: 118 MG/DL (ref 74–99)
GLUCOSE BLD MANUAL STRIP-MCNC: 148 MG/DL (ref 74–99)
GLUCOSE BLD MANUAL STRIP-MCNC: 53 MG/DL (ref 74–99)
GLUCOSE BLD MANUAL STRIP-MCNC: 57 MG/DL (ref 74–99)

## 2024-06-01 PROCEDURE — 2500000001 HC RX 250 WO HCPCS SELF ADMINISTERED DRUGS (ALT 637 FOR MEDICARE OP): Performed by: SPECIALIST

## 2024-06-01 PROCEDURE — 82947 ASSAY GLUCOSE BLOOD QUANT: CPT

## 2024-06-01 PROCEDURE — 99231 SBSQ HOSP IP/OBS SF/LOW 25: CPT | Performed by: INTERNAL MEDICINE

## 2024-06-01 PROCEDURE — 2500000002 HC RX 250 W HCPCS SELF ADMINISTERED DRUGS (ALT 637 FOR MEDICARE OP, ALT 636 FOR OP/ED): Mod: MUE | Performed by: SPECIALIST

## 2024-06-01 PROCEDURE — 2500000005 HC RX 250 GENERAL PHARMACY W/O HCPCS: Performed by: SPECIALIST

## 2024-06-01 RX ADMIN — DEXTROSE MONOHYDRATE 12.5 G: 25 INJECTION, SOLUTION INTRAVENOUS at 09:48

## 2024-06-01 RX ADMIN — SEVELAMER CARBONATE 2400 MG: 800 TABLET, FILM COATED ORAL at 09:26

## 2024-06-01 RX ADMIN — LINEZOLID 600 MG: 600 TABLET, FILM COATED ORAL at 09:27

## 2024-06-01 RX ADMIN — Medication 1 CAPSULE: at 09:27

## 2024-06-01 ASSESSMENT — COGNITIVE AND FUNCTIONAL STATUS - GENERAL
DRESSING REGULAR UPPER BODY CLOTHING: A LOT
TURNING FROM BACK TO SIDE WHILE IN FLAT BAD: A LOT
MOVING TO AND FROM BED TO CHAIR: TOTAL
CLIMB 3 TO 5 STEPS WITH RAILING: TOTAL
MOBILITY SCORE: 8
CLIMB 3 TO 5 STEPS WITH RAILING: TOTAL
WALKING IN HOSPITAL ROOM: TOTAL
STANDING UP FROM CHAIR USING ARMS: TOTAL
DAILY ACTIVITIY SCORE: 13
PERSONAL GROOMING: A LOT
TOILETING: A LOT
WALKING IN HOSPITAL ROOM: TOTAL
DRESSING REGULAR UPPER BODY CLOTHING: A LOT
MOVING FROM LYING ON BACK TO SITTING ON SIDE OF FLAT BED WITH BEDRAILS: A LOT
TOILETING: A LOT
EATING MEALS: A LITTLE
DRESSING REGULAR LOWER BODY CLOTHING: A LOT
TURNING FROM BACK TO SIDE WHILE IN FLAT BAD: A LOT
MOVING FROM LYING ON BACK TO SITTING ON SIDE OF FLAT BED WITH BEDRAILS: A LOT
HELP NEEDED FOR BATHING: A LOT
HELP NEEDED FOR BATHING: A LOT
MOVING TO AND FROM BED TO CHAIR: TOTAL
DAILY ACTIVITIY SCORE: 13
PERSONAL GROOMING: A LOT
STANDING UP FROM CHAIR USING ARMS: TOTAL
DRESSING REGULAR LOWER BODY CLOTHING: A LOT
EATING MEALS: A LITTLE
MOBILITY SCORE: 8

## 2024-06-01 ASSESSMENT — PAIN SCALES - GENERAL
PAINLEVEL_OUTOF10: 0 - NO PAIN
PAINLEVEL_OUTOF10: 0 - NO PAIN

## 2024-06-01 ASSESSMENT — PAIN SCALES - WONG BAKER: WONGBAKER_NUMERICALRESPONSE: NO HURT

## 2024-06-01 NOTE — PROGRESS NOTES
Edwar Hemphill is a 39 y.o. male on day 4 of admission presenting with Pneumonia of both lungs due to infectious organism, unspecified part of lung.    Subjective   Mr. Hemphill is a 39-year-old man with a history of ESRD on hemodialysis.  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  He has had many bloodstream infections related to catheters, again during this admission.  He has MRSA, permcath was removed but he required a temporary femoral hemodialysis catheter due to persistent bacteremia.  TTE without endocarditis, then had a POLI as well on May 14.  CT was without an epidural abscess, MRI was done that was also negative for infection.    Resting comfortably in bed.   Had dialysis uneventfully yesterday.  GILMAR.   Chart/labs/meds/notes/imaging/VS reviewed.       Objective     Physical Exam  Constitutional:       Appearance: Normal appearance.   HENT:      Mouth/Throat:      Mouth: Mucous membranes are moist.   Eyes:      Extraocular Movements: Extraocular movements intact.      Pupils: Pupils are equal, round, and reactive to light.   Cardiovascular:      Rate and Rhythm: Regular rhythm.      Heart sounds: S1 normal and S2 normal.   Pulmonary:      Breath sounds: Poor effort, no crackles  Abdominal:      Comments: Soft, NT/ND, no masses, normal bowel sounds   Genitourinary:     Comments: No dodd  Musculoskeletal:      Right lower leg: No edema.      Left lower leg: No edema.   Right upper extremity amputation  Skin:     General: Skin is warm and dry.   Neurological:      General: No focal deficit present.      Mental Status: She is alert and oriented to person, place, and time.   Psychiatric:         Behavior: Behavior normal.        Meds    Current Facility-Administered Medications:     acetaminophen (Tylenol) tablet 650 mg, 650 mg, oral, q4h PRN, Mauricio Estrada MD    B complex-vitamin C-folic acid (Nephrocaps) capsule 1 capsule, 1 capsule, oral, Daily, Mauricio Estrada MD, 1 capsule at  06/01/24 0927    dextrose 50 % injection 12.5 g, 12.5 g, intravenous, q15 min PRN, Mauricio Estrada MD, 12.5 g at 06/01/24 0948    dextrose 50 % injection 25 g, 25 g, intravenous, q15 min PRN, Mauricio Estrdaa MD    [START ON 6/3/2024] epoetin tyson-epbx (Retacrit) injection 6,000 Units, 6,000 Units, subcutaneous, Every Mon/Wed/Fri, Dori Flores, Andry    glucagon (Glucagen) injection 1 mg, 1 mg, intramuscular, q15 min PRN, Mauricio Estrada MD    glucagon (Glucagen) injection 1 mg, 1 mg, intramuscular, q15 min PRN, Mauricio Estrada MD    heparin 1,000 unit/mL injection 2,400 Units, 2,400 Units, intra-catheter, After Dialysis, Zeeshan Gonzalez MD, 2,400 Units at 05/31/24 1122    heparin 1,000 unit/mL injection 2,400 Units, 2,400 Units, intra-catheter, After Dialysis, Zeeshan Gonzalez MD, 2,400 Units at 05/31/24 1123    [Held by provider] insulin glargine (Lantus) injection 2 Units, 2 Units, subcutaneous, Nightly, Mauricio Estrada MD, 2 Units at 05/31/24 2205    insulin lispro (HumaLOG) injection 0-5 Units, 0-5 Units, subcutaneous, TID, Mauricio Estrada MD, 1 Units at 05/31/24 1413    levothyroxine (Synthroid, Levoxyl) tablet 125 mcg, 125 mcg, oral, Daily before breakfast, Mauricio Estrada MD, 125 mcg at 05/30/24 0614    linezolid (Zyvox) tablet 600 mg, 600 mg, oral, BID, Mauricio Estrada MD, 600 mg at 06/01/24 0927    loperamide (Imodium) capsule 4 mg, 4 mg, oral, q2h PRN, Mauricio Estrada MD    midodrine (Proamatine) tablet 5 mg, 5 mg, oral, Before Dialysis, Zeeshan Gonzalez MD, 5 mg at 05/29/24 0906    nitroglycerin (Nitrostat) SL tablet 0.4 mg, 0.4 mg, sublingual, q5 min PRN, Mauricio Estrada MD    oxyCODONE-acetaminophen (Percocet) 5-325 mg per tablet 1 tablet, 1 tablet, oral, q6h PRN, Mauricio Estrada MD, 1 tablet at 05/29/24 1206    pantoprazole (ProtoNix) EC tablet 40 mg, 40 mg, oral, Daily before breakfast, Mauricio Estrada MD, 40 mg at 05/30/24 0614    sevelamer carbonate (Renvela) tablet 2,400 mg, 2,400 mg, oral,  "TID, Mauricio Estrada MD, 2,400 mg at 06/01/24 0926    simethicone (Mylicon) chewable tablet 80 mg, 80 mg, oral, q8h PRN, Mauricio Estrada MD    vancomycin (Vancocin) pharmacy to dose - pharmacy monitoring, , miscellaneous, Daily PRN, Mat Jauregui MD   Medications Discontinued During This Encounter   Medication Reason    DAPTOmycin (Cubicin) 700 mg/100 mL  mg     midodrine (Proamatine) tablet 5 mg     epoetin tyson-epbx (Retacrit) injection 6,000 Units     epoetin tyson-epbx (Retacrit) injection 6,000 Units         Allergies  Allergies   Allergen Reactions    Cashew Nut Anaphylaxis and Swelling     cashews        Last Recorded Vitals  Blood pressure 134/89, pulse 71, temperature 36.5 °C (97.7 °F), temperature source Oral, resp. rate 20, height 1.753 m (5' 9.02\"), weight 68.5 kg (151 lb), SpO2 100%.  Intake/Output last 3 Shifts:  I/O last 3 completed shifts:  In: 1300 (19 mL/kg) [I.V.:800 (11.7 mL/kg); Other:400; IV Piggyback:100]  Out: 2400 (35 mL/kg) [Other:2400]  Weight: 68.5 kg     Last 24 hour Results  Results for orders placed or performed during the hospital encounter of 05/28/24 (from the past 24 hour(s))   POCT GLUCOSE   Result Value Ref Range    POCT Glucose 179 (H) 74 - 99 mg/dL   POCT GLUCOSE   Result Value Ref Range    POCT Glucose 145 (H) 74 - 99 mg/dL   POCT GLUCOSE   Result Value Ref Range    POCT Glucose 153 (H) 74 - 99 mg/dL   POCT GLUCOSE   Result Value Ref Range    POCT Glucose 57 (L) 74 - 99 mg/dL   POCT GLUCOSE   Result Value Ref Range    POCT Glucose 53 (L) 74 - 99 mg/dL   POCT GLUCOSE   Result Value Ref Range    POCT Glucose 118 (H) 74 - 99 mg/dL   POCT GLUCOSE   Result Value Ref Range    POCT Glucose 148 (H) 74 - 99 mg/dL        Imaging results  Electrocardiogram, 12-lead PRN ACS symptoms    Result Date: 5/17/2024  Atrial fibrillation with rapid ventricular response with premature ventricular or aberrantly conducted complexes Nonspecific ST and T wave abnormality , probably digitalis " effect Abnormal ECG When compared with ECG of 16-JAN-2024 07:06, Atrial fibrillation has replaced Sinus rhythm ST elevation now present in Inferior leads Nonspecific T wave abnormality, worse in Lateral leads    IR CVC removal    Result Date: 5/16/2024  These images are not reportable by radiology and will not be interpreted by  Radiologists.    Transesophageal Echo (POLI)    Result Date: 5/14/2024   [facility], [address] [place] [address]              Tel [phone] and Fax [phone] TRANSESOPHAGEAL ECHOCARDIOGRAM REPORT  Patient Name:      XIOMARA TRUJILLO PHYLLIS        Reading Physician:    [id number] Edward Hernandez DO Study Date:        5/14/2024           Ordering Provider:    [id number] ALKA VIEYRA MRN/PID:           [id number]            Fellow: Accession#:        [id number]        Nurse:                Ronaldo Woo RN Date of Birth/Age: 1985 / 39      Sonographer:          Alejandro Carroll RDCS                    years Gender:            M                   Additional Staff:     Kayden Matthews CRNA Height:            175.26 cm           Admit Date:           4/30/2024 Weight:            72.58 kg            Admission Status:     Inpatient -                                                              Routine BSA / BMI:         1.88 m2 / 23.63     Encounter#:           [id number]                    kg/m2                                        Department Location:  [facility]                                                              Invasive Blood Pressure: 116 /69 mmHg Study Type:    TRANSESOPHAGEAL ECHO (POLI) Diagnosis/ICD: Bacteremia-R78.81 Indication:    Staphyococcus aureus bacteremia, R/o endocarditis CPT Code:      POLI Complete-25494; Doppler Limited-38261; Color Doppler-34761 Patient History: Allergies:         Cashew nuts. Smoker:            Unknown.  Diabetes:          Yes Insulin Pertinent History: HTN, Hyperlipidemia and PVD. 39 y.o. male presents for POLI                    for endocarditis rule out.                     PMH of ESRD and SIRS. Study Detail: The following Echo studies were performed: 2D. Agitated saline               used as a contrast agent for intraseptal flow evaluation. Total               contrast used for this procedure was 10 mL via IV push. Patient's               heart rhythm is sinus tachycardia. The patient was sedated.  PHYSICIAN INTERPRETATION: POLI Details: The POLI probe used was 5177. Technically adequate omniplane transesophageal echocardiogram performed. POLI Medication: The pharynx was anesthetized with Cetacaine spray and viscous lidocaine. The patient was sedated by Anesthesia; please refer to anesthesia flow sheet for medications used. POLI Procedure: The probe was passed without difficulty. Left Ventricle: The left ventricular systolic function is mildly decreased, with an estimated ejection fraction of 35-40%. Wall motion is abnormal. The left ventricular cavity size was not assessed. Left ventricular diastolic filling was not assessed. Left Atrium: The left atrium is normal in size. There is no definite left atrial thrombus present. A bubble study using agitated saline was performed. Bubble study is negative. There is a normal sized left atrial appendage. Right Ventricle: The right ventricle is normal in size. There is normal right ventricular global systolic function. Right Atrium: The right atrium is normal in size. Aortic Valve: There is no visualized aortic valve vegetation. The aortic valve appears structurally normal. There is no evidence of aortic valve regurgitation. Mitral Valve: The mitral valve is normal in structure. There was no mitral valve vegetation visualized. There is trace mitral valve regurgitation. Tricuspid Valve: No vegetation is seen on the tricuspid valve. The tricuspid valve is structurally normal.  There is trace tricuspid regurgitation. Pulmonic Valve: No pulmonic valve vegetation visualized. The pulmonic valve is structurally normal. There is no indication of pulmonic valve regurgitation. Pericardium: There is no pericardial effusion noted. Aorta: The aortic root is normal.  CONCLUSIONS:  1. Left ventricular systolic function is mildly decreased with a 35-40% estimated ejection fraction.  2. No mitral valve vegetation visualized.  3. No tricuspid valve vegetation.  4. No aortic valve vegetation visualized.  5. No pulmonic valve vegetation visualized.  6. No left atrial thrombus. QUANTITATIVE DATA SUMMARY:  [id number] Edward Hernandez DO Electronically signed on 5/14/2024 at 7:06:59 PM  ** Final **     IR CVC tunneled    Result Date: 5/13/2024  Interpreted By:  Ronaldo Dempsey, STUDY: IR CVC TUNNELED; ;  5/13/2024 3:26 pm   ULTRASOUND FLUOROSCOPICALLY GUIDED RIGHT GROIN TEMPORARY HEMODIALYSIS CATHETER PLACEMENT   INDICATION: Signs/Symptoms:Temporary dialysis line today. 39-year-old man with end-stage renal disease, persistent bacteremia despite removal of groin tunneled hemodialysis catheter. Requires replacement of hemodialysis access.   COMPARISON: Fluoroscopic images from prior tunneled hemodialysis catheter exchange 05/13/2024   ACCESSION NUMBER(S): [id number]   ORDERING CLINICIAN: BRADEN QUIROGA   TECHNIQUE:   ATTENDING : Ronaldo Dempsey M.D.   TECHNICAL DESCRIPTION/FINDINGS: The procedure, including all risks, benefits and alternatives were explained to the patient in detail. All questions were answered and written informed consent was obtained.   Patient was positioned supine on the fluoroscopy table. A time-out was performed.   The bilateral groins were prepped and draped in usual sterile fashion. Focused ultrasound was performed over the superior aspect of the right common femoral vein demonstrating superior patency with partial compressibility. Ultrasound images were saved. 1% lidocaine was  administered subcutaneously for local anesthesia. Under real-time ultrasound guidance, the right common femoral vein was accessed in antegrade direction using micropuncture technique. Ultrasound images were saved. Under fluoroscopic visualization, an 018 guidewire was advanced into the right common iliac vein and micropuncture transitional introducer was utilized for placement of an 035 guidewire into the IVC. After serial dilation, a 13 Latvian by 20 cm Trialysis catheter was then placed. A completion fluoroscopic image was obtained demonstrating the tip of the temporary hemodialysis catheter projecting over the IV C.   Catheter lumens easily aspirated and flushed. Large-bore dialysis lumens were locked with a concentrated heparinized solution (1000 units/cc). A small bore 3rd lumen was locked with a dilute heparinized solution. The catheter hub was sutured to the skin with 2 0 Prolene stay suture. Sterile dressing was applied.   SEDATION/MEDICATIONS: Continuous cardiopulmonary monitoring was performed by a radiology nurse for the duration of the procedure. 0.5 mg Versed and   25 mcg Fentanyl were administered for moderate conscious sedation time of 20 minutes.      10 cc 1% Lidocaine was administered subcutaneously for local anesthesia. SPECIMENS: None. ESTIMATED BLOOD LOSS:  5 cc FLOUROSCOPY:  0.1 minutes; DAP  2405 mGy-cm*2; Air Kerma  6.58 mGy CONTRAST: None.   FINDINGS: Test       Ultrasound fluoroscopically guided right groin temporary hemodialysis catheter. The catheter is ready for immediate use.     MACRO: None   Signed by: Ronaldo Dempsey 5/13/2024 4:59 PM Dictation workstation:   [id number]    MR lumbar spine wo IV contrast    Result Date: 5/4/2024  Interpreted By:  Eva Og and Muddasani Dheeraj STUDY: MR LUMBAR SPINE WO IV CONTRAST   INDICATION: Signs/Symptoms:back pain new with bacteremia, possible infection   COMPARISON: CT of the lumbar spine 04/30/2024. Body CT 01/14/2024.   ACCESSION  NUMBER(S): [id number]   ORDERING CLINICIAN: ALKA VIEYRA   TECHNIQUE: Sagittal T1, sagittal T2, sagittal STIR, axial T1 and axial T2 weighted MRI images of the lumbar spine were obtained without intravenous contrast administration.   FINDINGS: Image quality is somewhat degraded due to motion and technique.   There are 5 non-rib-bearing lumbar-type vertebral bodies. The last well-formed intervertebral disc is labeled L5-S1.   Minimal retrolisthesis at L5-S1. Alignment is otherwise normal.   Vertebral body heights are maintained. Disc spaces are relatively maintained.   There is diffusely hypointense bone marrow signal on the T1 weighted images which may be related to renal osteodystrophy. Differential considerations include anemia, reactive marrow or bone marrow infiltrating process.   There is no increased STIR signal to suggest marrow edema. Multilevel degenerative endplate changes include prominent Schmorl's nodes most pronounced at L3-4 where there are Modic type 2 changes and central intradiscal T1 and T2 hypointense signal likely degenerative.   The conus medullaris terminates at L1-2. The visualized spinal cord, conus medullaris and cauda equina demonstrate normal signal and morphology.   Fatty atrophic changes involve the paravertebral musculature. Partially visualized kidneys demonstrate atrophic changes with multiple bilateral cysts, better characterized on previous body CT dated 01/14/2024. Partially visualized catheter within the inferior vena cava and right common iliac vein.   T10-11 on sagittal images only: No spinal canal or neural foraminal stenosis.   T11-12 on sagittal images only: No spinal canal or neural foraminal stenosis.   T12-L1: Facet arthropathy and ligamentum flavum thickening. No spinal canal or neural foraminal stenosis.   L1-2: Facet arthropathy and ligamentum flavum thickening. No spinal canal or neural foraminal stenosis.   L2-3: Facet arthropathy and ligamentum flavum thickening.  No spinal canal or neural foraminal stenosis.   L3-4: Disc bulge, facet arthropathy and ligamentum flavum thickening. No spinal canal or neural foraminal stenosis.   L4-5: Disc bulge with small central protrusion, facet arthropathy and ligamentum flavum thickening. No spinal canal stenosis. Mild bilateral neural foraminal stenosis.   L5-S1: Disc bulge, facet arthropathy and ligamentum flavum thickening. No spinal canal stenosis. Mild right and no left neural foraminal stenosis.   Degenerative changes are noted at the sacroiliac joints.       Diffusely hypointense bone marrow signal on the T1 weighted images may be related to renal osteodystrophy. Differential considerations include anemia, reactive bone marrow or bone marrow infiltrating process.   No definite imaging findings to suggest discitis osteomyelitis on the current exam.   Degenerative changes without significant spinal canal stenosis. Varying degrees of mild neural foraminal narrowing as detailed above.   I personally reviewed the images/study and I agree with the findings as stated by Dr. Lowe.   MACRO: None   Signed by: Eva Og 5/4/2024 11:11 AM Dictation workstation:   [id number]    IR CVC exchange    Result Date: 5/3/2024  Interpreted By:  Ronaldo Dempsey, STUDY: IR CVC EXCHANGE; ;  5/3/2024 6:28 pm   FLUOROSCOPICALLY GUIDED EXCHANGE OF RIGHT GROIN TUNNELED HEMODIALYSIS CATHETER   INDICATION: Signs/Symptoms:Bacteremia- Right fem TDC exchange. 39-year-old man with end-stage renal disease, central thoracic venous occlusion, end-stage vascular access with bacteremia. Guidewire exchange of right groin tunneled hemodialysis catheter in an attempt to salvage the access.   COMPARISON: Chest radiograph 04/30/2024, fluoroscopic images from prior tunneled hemodialysis catheter placement 01/22/2024   ACCESSION NUMBER(S): [id number]   ORDERING CLINICIAN: SACHI MORTENSEN   TECHNIQUE:   ATTENDING : Ronaldo Dempsey M.D.   TECHNICAL  DESCRIPTION/FINDINGS: The procedure, including all risks, benefits and alternatives were explained to the patient in detail. All questions were answered and written informed consent was obtained.   The patient was positioned supine on the angiography table. A time-out was performed.   The right groin was prepped and draped in usual sterile fashion. A  fluoroscopic image was obtained demonstrating the tunneled hemodialysis catheter terminating at the inferior cavoatrial junction.   1% lidocaine was administered via the subcutaneous tunnel tract for local anesthesia. Blunt dissection was performed to free the subcutaneous catheter cuff. An 035 stiff Glidewire was then advanced through the catheter into the right atrium. The old catheter was removed. Over the guidewire, a new 14.5 [place] Islander by 42 cm tip to cuff dual-lumen hemodialysis catheter was then placed. A completion fluoroscopic image demonstrates the catheter in the inferior right atrium.   Catheter lumens easily aspirated and flushed and were locked with a concentrated heparinized solution (1000 units/cc). The catheter hub was then sutured to the skin with 2 0 Prolene stay suture. Because of losing along the tract, Gel-Foam pledgets were then administered subcutaneously near where the catheter enters the skin for additional hemostasis, followed by a pursestring suture. A sterile dressing was applied.   SEDATION/MEDICATIONS: Continuous cardiopulmonary monitoring was performed by a radiology nurse for the duration of the procedure.  1 mg Versed and   50 mcg Fentanyl were administered for moderate conscious sedation time of 20 minutes.      10 cc 1% Lidocaine was administered subcutaneously for local anesthesia. SPECIMENS: None. ESTIMATED BLOOD LOSS:  5 cc FLOUROSCOPY:  1.2 minutes; DAP  51639 mGy-cm*2; Air Kerma  59.07 mGy CONTRAST: None.   FINDINGS: Test       Fluoroscopically guidewire exchange of right groin tunneled hemodialysis catheter. The newly  exchanged catheter is ready for immediate use.     MACRO: None   Signed by: Ronaldo Dempsey 5/3/2024 8:09 PM Dictation workstation:   [id number]    Transthoracic Echo Complete    Result Date: 5/3/2024   [facility], [address] [place] [address]              Tel [phone] and Fax [phone] TRANSTHORACIC ECHOCARDIOGRAM REPORT  Patient Name:      XIOMARA Maya Physician:    [id number] Brandon Nur DO Study Date:        5/3/2024             Ordering Provider:    [id number] ALKA VIEYRA MRN/PID:           [id number]             Fellow: Accession#:        [id number]         Nurse: Date of Birth/Age: 1985 / 39 years Sonographer:          Sy Lyons RDCS Gender:            M                    Additional Staff: Height:            175.00 cm            Admit Date: Weight:            65.80 kg             Admission Status:     Inpatient -                                                               Routine BSA / BMI:         1.80 m2 / 21.49      Encounter#:           [id number]                    kg/m2                                         Department Location:  [facility]                                                               Invasive Blood Pressure: 112 /74 mmHg Study Type:    TRANSTHORACIC ECHO (TTE) COMPLETE Diagnosis/ICD: Endocarditis, valve unspecified-I38 Indication:    endocarditis CPT Code:      Echo Complete w Full Doppler-59266 Patient History: Pertinent History: HTN and Hyperlipidemia. Study Detail: The following Echo studies were performed: M-Mode, Doppler, color               flow and 2D.  PHYSICIAN INTERPRETATION: Left Ventricle: The left ventricular systolic function is moderately decreased, with an estimated ejection fraction of 35-40%. There are no regional wall motion abnormalities. The left ventricular cavity size is normal. Abnormal (paradoxical) septal motion, consistent with left bundle  branch block. Spectral Doppler shows an impaired relaxation pattern of left ventricular diastolic filling. Left Atrium: The left atrium is normal in size. Right Ventricle: The right ventricle is normal in size. There is normal right ventricular global systolic function. Right Atrium: The right atrium is normal in size. Aortic Valve: The aortic valve is probably trileaflet. There is no evidence of aortic valve regurgitation. Mitral Valve: The mitral valve is mildly thickened. There is trace mitral valve regurgitation. Tricuspid Valve: The tricuspid valve is structurally normal. No evidence of tricuspid regurgitation. Pulmonic Valve: The pulmonic valve is structurally normal. There is no indication of pulmonic valve regurgitation. Pericardium: There is no pericardial effusion noted. Aorta: The aortic root is normal. In comparison to the previous echocardiogram(s): Compared with study from 1/16/2024, LV function has declined. Was previously normal.  CONCLUSIONS:  1. Left ventricular systolic function is moderately decreased with a 35-40% estimated ejection fraction.  2. Abnormal septal motion consistent with left bundle branch block.  3. Spectral Doppler shows an impaired relaxation pattern of left ventricular diastolic filling.  4. No vegetations visualized within the limitations of this study.  5. Compared with study from 1/16/2024, LV function has declined. Was previously normal. QUANTITATIVE DATA SUMMARY: LA VOLUME:                               Normal Ranges: LA Vol A4C:        40.1 ml    (22+/-6mL/m2) LA Vol A2C:        35.3 ml LA Vol BP:         40.9 ml LA Vol Index A4C:  22.3ml/m2 LA Vol Index A2C:  19.6 ml/m2 LA Vol Index BP:   22.7 ml/m2 LA Area A4C:       15.3 cm2 LA Area A2C:       13.2 cm2 LA Major Axis A4C: 5.0 cm LA Major Axis A2C: 4.2 cm LA Volume Index:   22.7 ml/m2  [id number] Brandon Nur DO Electronically signed on 5/3/2024 at 4:24:04 PM  ** Final **     Lower extremity venous duplex right    Result  Date: 5/1/2024             [place] [address]   Tel [phone] and Fax [phone]  Vascular Lab Report VASC US LOWER EXTREMITY VENOUS DUPLEX RIGHT  Patient Name:      XIOMARA CASSANDRA Maya Physician:  [id number] Rayo Jauregui MD, RPVI Study Date:        4/30/2024            Ordering Physician: [id number] DEEP PANIAGUA MRN/PID:           [id number]             Technologist:       Megan Shay RVT Accession#:        [id number]         Technologist 2: Date of Birth/Age: 1985 / 39 years Encounter#:         [id number] Gender:            M Admission Status:  Emergency            Location Performed: [facility]  Diagnosis/ICD: Pain in right leg-M79.604 CPT Codes:     04915 Peripheral venous duplex scan for DVT Limited  **CRITICAL RESULT** Critical Result: Acute DVT in the right leg. Notification called to Deep Paniagua PA-C on 4/30/2024 at 10:52:21 AM by Megan Shay RDMS, RVT.  CONCLUSIONS: Right Lower Venous: There is acute non-occlusive deep vein thrombosis visualized in the common femoral vein. There are chronic changes visualized in the popliteal vein. Enlarged lymph node noted in the right groin. Waveforms suggestive of more distal obstruction/thrombus. May wish further means of imaging evaluation. Left Lower Venous: There are chronic changes visualized in the common femoral vein.  Comparison: Compared with study from 7/27/2023, New finding of acute non-occlusive thrombus in the right CFV.  Imaging & Doppler Findings:  Right                 Compressible      Thrombus          Flow Distal External Iliac                     None CFV                     Partial    Acute non-occlusive Continuous PFV                       Yes             None FV Proximal               Yes             None         Continuous FV Mid                    Yes             None FV Distal                 Yes             None  Popliteal                 Yes            Chronic       Continuous Peroneal                  Yes             None PTV                       Yes             None  Left Compress Thrombus        Flow CFV    Yes    Chronic  Spontaneous/Phasic  [id number] Rayo Jauregui MD, RPBRIAN Electronically signed by [id number] MARIANO Call MD on 5/1/2024 at 9:13:07 AM  ** Final **     XR chest 1 view    Result Date: 4/30/2024  STUDY: Chest Radiograph;  4/30/2024 at 2:42 PM. INDICATION: Fever. COMPARISON: XR chest 1/14/2024, 11/10/2023; CTA chest 2/15/2023. ACCESSION NUMBER(S): [id number] ORDERING CLINICIAN: EWNDY INFANTE TECHNIQUE:  Frontal chest was obtained at 14:42 hours. FINDINGS: CARDIOMEDIASTINAL SILHOUETTE: Cardiomediastinal silhouette is normal in size and configuration.  LUNGS: Patchy right basilar infiltrate or atelectasis with small right pleural effusion. Left basilar scar versus atelectasis. ABDOMEN: No remarkable upper abdominal findings.  BONES: No acute osseous changes.    Patchy right basilar infiltrate or atelectasis with small right pleural effusion. Signed by Joseph Overton MD    CT thoracic spine wo IV contrast    Result Date: 4/30/2024  STUDY: CT Thoracic Spine and Lumbar Spine without IV Contrast; 4/30/2024 11:11 AM INDICATION: Midline back pain. COMPARISON: None Available. ACCESSION NUMBER(S): [id number], [id number] ORDERING CLINICIAN: RAJNI FARMER TECHNIQUE:  CT of the thoracic spine and lumbar spine was performed without intravenous or intrathecal contrast.  Sagittal and coronal reconstructions were generated.  Automated mA/kV exposure control was utilized and patient examination was performed in strict accordance with principles of ALARA. FINDINGS: Portions of the T1 vertebral body, as well as a small portion of the anterior superior aspect of T2 is cut off on this study.  No compression deformities are visualized within the thoracic vertebral bodies from T2 through T12.  No spondylolisthesis is noted.  No  pedicular defects are noted.  No prominent edema is appreciated within the adjacent posterior paravertebral musculature.  Evaluation of the thecal sac is limited due to lack of myelographic contrast.  There are enlarged lymph nodes seen within the visualized portions of the mediastinum.  There are bilateral lower lobe infiltrates with tiny bilateral effusions.  Coronary artery calcifications are present. No compression deformities are visualized within the lumbar spine.  No pars defects are noted.  No significant spondylolisthesis is seen.  No prominent edema is appreciated within the visualized portions of the psoas musculature.  A long dialysis catheter is visualized within the inferior vena cava.  Evaluation of the thecal sac is limited due to lack of myelographic contrast.  There is disc bulging seen from L3 through S1.  No significant impingement is appreciated upon the neural foramina on the sagittal reconstructions.    Thoracic spine: 1.  Portions of T1 and T2 cough on this study. 2.  No acute osseous findings seen from T3 through T12. 3.  Tiny bilateral effusions with adjacent lower lobe infiltrates. 4.  The distal adenopathy. 5.  Coronary artery calcifications. Lumbar spine: 1.  No compression deformities or spinal listhesis is noted. 2.  Disc bulging from L3 to S1.  Evaluation for spinal stenosis is limited due to lack mammographic contrast. 3.  Incidental note of a long dialysis catheter within the inferior vena cava. Signed by Deejay Hoffman MD    CT lumbar spine wo IV contrast    Result Date: 4/30/2024  STUDY: CT Thoracic Spine and Lumbar Spine without IV Contrast; 4/30/2024 11:11 AM INDICATION: Midline back pain. COMPARISON: None Available. ACCESSION NUMBER(S): [id number], [id number] ORDERING CLINICIAN: RAJNI FARMER TECHNIQUE:  CT of the thoracic spine and lumbar spine was performed without intravenous or intrathecal contrast.  Sagittal and coronal reconstructions were generated.  Automated mA/kV  exposure control was utilized and patient examination was performed in strict accordance with principles of ALARA. FINDINGS: Portions of the T1 vertebral body, as well as a small portion of the anterior superior aspect of T2 is cut off on this study.  No compression deformities are visualized within the thoracic vertebral bodies from T2 through T12.  No spondylolisthesis is noted.  No pedicular defects are noted.  No prominent edema is appreciated within the adjacent posterior paravertebral musculature.  Evaluation of the thecal sac is limited due to lack of myelographic contrast.  There are enlarged lymph nodes seen within the visualized portions of the mediastinum.  There are bilateral lower lobe infiltrates with tiny bilateral effusions.  Coronary artery calcifications are present. No compression deformities are visualized within the lumbar spine.  No pars defects are noted.  No significant spondylolisthesis is seen.  No prominent edema is appreciated within the visualized portions of the psoas musculature.  A long dialysis catheter is visualized within the inferior vena cava.  Evaluation of the thecal sac is limited due to lack of myelographic contrast.  There is disc bulging seen from L3 through S1.  No significant impingement is appreciated upon the neural foramina on the sagittal reconstructions.    Thoracic spine: 1.  Portions of T1 and T2 cough on this study. 2.  No acute osseous findings seen from T3 through T12. 3.  Tiny bilateral effusions with adjacent lower lobe infiltrates. 4.  The distal adenopathy. 5.  Coronary artery calcifications. Lumbar spine: 1.  No compression deformities or spinal listhesis is noted. 2.  Disc bulging from L3 to S1.  Evaluation for spinal stenosis is limited due to lack mammographic contrast. 3.  Incidental note of a long dialysis catheter within the inferior vena cava. Signed by Deejay Hoffman MD    XR pelvis 1-2 views    Result Date: 4/30/2024  STUDY: Pelvis Radiographs;  4/30/2024 at 9:06 AM. INDICATION: Pain. COMPARISON: CT pelvis 7/17/2023. ACCESSION NUMBER(S): [id number] ORDERING CLINICIAN: RAJNI FARMER TECHNIQUE:  One view(s) of the pelvis. FINDINGS:  The pelvic ring is intact.  There is no acute fracture.  Severe underlying osteopenia.    Slightly limited secondary to underlying osteopenia.  No acute osseous abnormality. Signed by Roe Ruiz MD       Assessment and Plan    Edwar Hemphill is a 38 y.o. male with a past medical history of end-stage renal disease on hemodialysis via right femoral TDC who resides at [facility]. 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  He later had stump debridement on 7/27 with wound VAC placement.  He had polymicrobial infection including resistant E coli, Acinetobacter baumannii and Enterobacter fecalis. He was admitted last September with positive blood cultures growing Acinetobacter baumannii and Stenotrophomonas. He went to IR for dialysis line exchange.  He then was admitted again in November where CT noted left sided fluid collection concerning for abscess.  He grew MRSA and Streptococcus, catheter was exchanged on November 13, he completed vancomycin. He then had Staph aureus CLABSI again in January of this.  POLI negative for endocarditis. HD cath removed 1/16 and replaced on 1/19 to the L groin. IV Vancomycin 750mg with HD through 2/14/24.      He comes in now with fever with low back pain radiating down the right leg. Blood cultures were obtained and came back growing MRSA. Infectious disease is following and managing antimicrobial coverage. He remains bacteremic with MRSA despite guidewire  exchange of the groin tunneled HD catheter on 5/3. MRI of the lumbar spine did not show osteomyelitis/discitis and 2 D ECHO was negative for a vegetation. We dialyzed him on Wednesday. We removed his line all together for a line holiday on 5/9.     A right femoral temporary dialysis line was placed and later removed. Repeat cultures from 5/21 are without growth. Antimicrobial coverage with daptomycin and Teflaro noted. ID is following. PermCath was replaced on 5/24.  His clinical course was further complicated by hypoxia with lung infiltrates.  Peripheral eosinophilia was noted.  ID was suspicious for daptomycin induced pulmonary toxicity.  He was transitioned to vancomycin and linezolid.  He is planned for discharge.  He will need to follow-up with infectious disease regarding the duration of the IV vancomycin.    I spent a total of 40 minutes with this patient today.    Eber Bruce, DO

## 2024-06-01 NOTE — PROGRESS NOTES
06/01/24 1354   Discharge Planning   Patient expects to be discharged to: left  for mom, larry aware of dc     Edwar Hemphill is a 39 y.o. male on day 4 of admission presenting with Pneumonia of both lungs due to infectious organism, unspecified part of lung.    Joyce Miller RN

## 2024-06-01 NOTE — PROGRESS NOTES
Edwar Hemphill is a 39 y.o. male on day 3 of admission presenting with Pneumonia of both lungs due to infectious organism, unspecified part of lung.      Subjective   Chart rev       Objective     Last Recorded Vitals  /67 (BP Location: Left arm, Patient Position: Lying)   Pulse 79   Temp 36.5 °C (97.7 °F) (Temporal)   Resp 18   Wt 68.5 kg (151 lb)   SpO2 94%   Intake/Output last 3 Shifts:    Intake/Output Summary (Last 24 hours) at 5/31/2024 2059  Last data filed at 5/31/2024 1903  Gross per 24 hour   Intake 1300 ml   Output 2400 ml   Net -1100 ml       Admission Weight  Weight: 68.5 kg (151 lb) (05/28/24 0316)    Daily Weight  05/29/24 : 68.5 kg (151 lb)    Image Results  ECG 12 Lead  Normal sinus rhythm  Nonspecific T wave abnormality  Prolonged QT  Abnormal ECG  When compared with ECG of 04-MAY-2024 10:11,  Sinus rhythm has replaced Atrial fibrillation  Vent. rate has decreased BY  59 BPM  ST no longer elevated in Inferior leads  Nonspecific T wave abnormality no longer evident in Inferior leads  Nonspecific T wave abnormality now evident in Anterior leads  See ED provider note for full interpretation and clinical correlation  Confirmed by Essie Wright ([id number]) on 5/28/2024 11:11:48 AM  XR chest 1 view  Narrative: Interpreted By:  Gigi Acevedo,   STUDY:  XR CHEST 1 VIEW;  5/28/2024 3:58 am      INDICATION:  Signs/Symptoms: Hypoxia.      COMPARISON:  04/30/2024, 03/31/2024      ACCESSION NUMBER(S):  [id number]      ORDERING CLINICIAN:  ADAL STEWART      FINDINGS:          Normal heart size. Similar small bilateral pleural effusions. New  patchy left mid lung and right suprahilar airspace opacities when  compared to 04/30/2024. Partially visualized lower extremity approach  central venous catheter with the tip projected over the right atrium.  Mild diffuse interstitial prominence. No pneumothorax. Normal heart  size. Severe osteopenia. No acute osseous abnormality.      Impression: 1. New patchy  left mid lung and right suprahilar airspace opacities  when compared to 04/30/2024. Consider pneumonia.  2. Mild diffuse interstitial prominence, likely interstitial edema.  3. Similar small bilateral pleural effusions.      Signed by: Gigi Acevedo 5/28/2024 4:04 AM  Dictation workstation:   [id number]      PHYSICAL EXAM  NEUROLOGICAL: No abnormal movements, no changes from before  HEENT: Head atraumatic, perrl,eomi, no oral lesions, no ear rash   NECK: Supple, no ln, jvp flat, thyroid palp  HEART: S1, S2, no added sound  LUNGS: dec a/e  EXTREMITIES: no edema  ABDOMEN: Soft, nontender, bowel sound positive, no organomegaly  SKIN: No change  EYES: No changes, perrl, eomi,   ENT: No change    JOINT: No change  Relevant Results               Assessment/Plan        This patient has a central line   Reason for the central line remaining today? Dialysis/Hemapheresis    Pullmonary toxicity from dapto  Esrd   Anemia   T2dm  Pvd    Awaiting culture       Malnutrition Diagnosis Status: New  Malnutrition Diagnosis: Severe malnutrition related to chronic disease or condition  As Evidenced by: 18% weight loss over 8 months, intake <75% meals for > 1 month.  I agree with the dietitian's malnutrition diagnosis.     t33    Mauricio Estrada MD

## 2024-06-01 NOTE — PROGRESS NOTES
"Edwar Hemphill is a 39 y.o. male on day 4 of admission presenting with Pneumonia of both lungs due to infectious organism, unspecified part of lung.    Subjective   Hypoglycemia this morning.  He noticed it after he got breakfast.     Scheduled medications  B complex-vitamin C-folic acid, 1 capsule, oral, Daily  [START ON 6/3/2024] epoetin tyson or biosimilar, 6,000 Units, subcutaneous, Every Mon/Wed/Fri  heparin, 2,400 Units, intra-catheter, After Dialysis  heparin, 2,400 Units, intra-catheter, After Dialysis  [Held by provider] insulin glargine, 2 Units, subcutaneous, Nightly  insulin lispro, 0-5 Units, subcutaneous, TID  levothyroxine, 125 mcg, oral, Daily before breakfast  linezolid, 600 mg, oral, BID  midodrine, 5 mg, oral, Before Dialysis  pantoprazole, 40 mg, oral, Daily before breakfast  sevelamer carbonate, 2,400 mg, oral, TID      Objective   Physical Exam  Constitutional:       General: He is not in acute distress.  Neurological:      Mental Status: He is alert.         Last Recorded Vitals  Blood pressure 122/81, pulse 70, temperature 36.6 °C (97.9 °F), temperature source Oral, resp. rate 20, height 1.753 m (5' 9.02\"), weight 68.5 kg (151 lb), SpO2 91%.  Intake/Output last 3 Shifts:  I/O last 3 completed shifts:  In: 1300 (19 mL/kg) [I.V.:800 (11.7 mL/kg); Other:400; IV Piggyback:100]  Out: 2400 (35 mL/kg) [Other:2400]  Weight: 68.5 kg     Relevant Results  Results from last 7 days   Lab Units 06/01/24  1027 06/01/24  0937 06/01/24  0837 05/31/24  2204 05/31/24  1656 05/30/24  0751 05/30/24  0619 05/28/24  0800 05/28/24  0416   POCT GLUCOSE mg/dL 118* 53* 57* 153* 145*   < >  --    < >  --    GLUCOSE mg/dL  --   --   --   --   --   --  98  --  146*    < > = values in this interval not displayed.      Assessment/Plan   Principal Problem:    Pneumonia of both lungs due to infectious organism, unspecified part of lung      TYPE 2 DIABETES MELLITUS  LONG TERM CURRENT INSULIN USE  Low insulin " requirement  Recurring AM hypoglycemia with Lantus given every night.       RECOMMENDATIONS  Trial of insulin glargine 2 units at bedtime, every other night  Insulin lispro scale QAC      Mike Santana MD

## 2024-06-02 LAB
BACTERIA BLD CULT: NORMAL
BACTERIA BLD CULT: NORMAL

## 2024-06-02 NOTE — DISCHARGE SUMMARY
Discharge Diagnosis  Pneumonia of both lungs due to infectious organism, unspecified part of lung    Issues Requiring Follow-Up  none    Discharge Meds     Your medication list        CONTINUE taking these medications        Instructions Last Dose Given Next Dose Due   acetaminophen 325 mg tablet  Commonly known as: Tylenol      Take 2 tablets (650 mg) by mouth every 4 hours if needed for moderate pain (4 - 6).       B complex-vitamin C-folic acid 1 mg capsule  Commonly known as: Nephrocaps           insulin glargine 100 unit/mL injection  Commonly known as: Lantus      Inject 2 Units under the skin once daily at bedtime. Take as directed per insulin instructions.       epoetin tyson-epbx 10,000 unit/mL injection  Commonly known as: Retacrit      Inject 1 mL (10,000 Units) under the skin 3 times a week.       insulin lispro 100 unit/mL injection  Commonly known as: HumaLOG      Inject 0-0.15 mL (0-15 Units) under the skin 3 times a day with meals. Take as directed per insulin instructions.       levothyroxine 125 mcg tablet  Commonly known as: Synthroid, Levoxyl           linezolid 600 mg tablet  Commonly known as: Zyvox      Take 1 tablet (600 mg) by mouth 2 times a day for 14 days.       loperamide 2 mg capsule  Commonly known as: Imodium           midodrine 5 mg tablet  Commonly known as: Proamatine      Take 1 tablet (5 mg) by mouth once daily on dialysis days for 15 doses.       nitroglycerin 0.4 mg SL tablet  Commonly known as: Nitrostat           oxyCODONE-acetaminophen 5-325 mg tablet  Commonly known as: Percocet      Take 1 tablet by mouth every 6 hours if needed for severe pain (7 - 10).       pantoprazole 40 mg EC tablet  Commonly known as: ProtoNix      Take 1 tablet (40 mg) by mouth once daily in the morning. Take before meals. Do not crush, chew, or split. Do not start before November 17, 2023.       sevelamer carbonate 800 mg tablet  Commonly known as: Renvela           simethicone 80 mg chewable  tablet  Commonly known as: Mylicon                  STOP taking these medications      apixaban 5 mg tablet  Commonly known as: Eliquis        cyclobenzaprine 5 mg tablet  Commonly known as: Flexeril        DAPTOmycin 700 mg/100 mL IV  Commonly known as: Cubicin        polyethylene glycol 17 gram packet  Commonly known as: Glycolax, Miralax        sennosides 8.6 mg tablet  Commonly known as: Senokot                 Test Results Pending At Discharge  Pending Labs       Order Current Status    Blood Culture Preliminary result    Blood Culture Preliminary result            Hospital Course   Seen by ID and nephrology  ID stopped daptomycin 2/2 pulm toxicity and started on vancomycin   Pt culture were negative and he was dc to facility on zyvox and vanco    Pertinent Physical Exam At Time of Discharge  Physical Exam   See progress notes  Outpatient Follow-Up  Future Appointments   Date Time Provider Department Center   10/24/2024 10:00 AM hCandu Boyd LCSW [id number]         Mauricio Estrada MD

## 2024-06-02 NOTE — PROGRESS NOTES
Late entry , pt seen at noon     Edwar Hemphill is a 39 y.o. male on day 4 of admission presenting with Pneumonia of both lungs due to infectious organism, unspecified part of lung.      Subjective   No issues        Objective     Last Recorded Vitals  /77 (Patient Position: Lying)   Pulse 88   Temp 36.6 °C (97.9 °F) (Oral)   Resp 18   Wt 68.5 kg (151 lb)   SpO2 100%   Intake/Output last 3 Shifts:  No intake or output data in the 24 hours ending 06/02/24 1001    Admission Weight  Weight: 68.5 kg (151 lb) (05/28/24 0316)    Daily Weight  05/29/24 : 68.5 kg (151 lb)    Image Results  ECG 12 Lead  Normal sinus rhythm  Nonspecific T wave abnormality  Prolonged QT  Abnormal ECG  When compared with ECG of 04-MAY-2024 10:11,  Sinus rhythm has replaced Atrial fibrillation  Vent. rate has decreased BY  59 BPM  ST no longer elevated in Inferior leads  Nonspecific T wave abnormality no longer evident in Inferior leads  Nonspecific T wave abnormality now evident in Anterior leads  See ED provider note for full interpretation and clinical correlation  Confirmed by Essie Wright ([id number]) on 5/28/2024 11:11:48 AM  XR chest 1 view  Narrative: Interpreted By:  Gigi Acevedo,   STUDY:  XR CHEST 1 VIEW;  5/28/2024 3:58 am      INDICATION:  Signs/Symptoms: Hypoxia.      COMPARISON:  04/30/2024, 03/31/2024      ACCESSION NUMBER(S):  [id number]      ORDERING CLINICIAN:  ADAL STEWART      FINDINGS:          Normal heart size. Similar small bilateral pleural effusions. New  patchy left mid lung and right suprahilar airspace opacities when  compared to 04/30/2024. Partially visualized lower extremity approach  central venous catheter with the tip projected over the right atrium.  Mild diffuse interstitial prominence. No pneumothorax. Normal heart  size. Severe osteopenia. No acute osseous abnormality.      Impression: 1. New patchy left mid lung and right suprahilar airspace opacities  when compared to 04/30/2024. Consider  pneumonia.  2. Mild diffuse interstitial prominence, likely interstitial edema.  3. Similar small bilateral pleural effusions.      Signed by: Gigi Acevedo 5/28/2024 4:04 AM  Dictation workstation:   [id number]      PHYSICAL EXAM  NEUROLOGICAL: No abnormal movements, no changes from before  HEENT: Head atraumatic, perrl,eomi, no oral lesions, no ear rash   NECK: Supple, no ln, jvp flat, thyroid palp  HEART: S1, S2, no added sound  LUNGS: dec a/e  EXTREMITIES: no edema  ABDOMEN: Soft, nontender, bowel sound positive, no organomegaly  Relevant Results               Assessment/Plan            Pum toxicity from dapto  Esrd  Anemia  t2dm      Principal Problem:    Pneumonia of both lungs due to infectious organism, unspecified part of lung    Dc today   t33      Malnutrition Diagnosis Status: New  Malnutrition Diagnosis: Severe malnutrition related to chronic disease or condition  As Evidenced by: 18% weight loss over 8 months, intake <75% meals for > 1 month.  I agree with the dietitian's malnutrition diagnosis.         Mauricio Estrada MD

## 2024-06-06 NOTE — DOCUMENTATION CLARIFICATION NOTE
"    PATIENT:               XIOMARA FERGUSON  ACCT #:                  [id number]  MRN:                       [id number]  :                       1985  ADMIT DATE:       2024 3:14 AM  DISCH DATE:        2024 6:23 PM  RESPONDING PROVIDER #:        [id number]          PROVIDER RESPONSE TEXT:    Daptomycin induced pneumonitis    CDI QUERY TEXT:    Clarification        Instruction:    Based on your assessment of the patient and the clinical information, please provide the requested documentation by clicking on the appropriate radio button and enter any additional information if prompted.    Question: Please further clarify the diagnosis for patient's presenting symptoms    When answering this query, please exercise your independent professional judgment. The fact that a question is being asked, does not imply that any particular answer is desired or expected.    The patient's clinical indicators include:  Clinical Information: 39 yr old man from [facility] with hypoxia and hypotension. Pt recently discharged following extended stay for line infection, bacteremia, and DKA. Pt was on daptomycin and linezolid at [facility]. Pt's PMH includes ESRD, DM, PVD, and anemia.    Clinical Indicators:  ID Progress Notes : Dr. Jauregui  \"Hypoxia and Lung Infiltrates - suspect Daptomycin pulmonary toxicity. Has peripheral eosinophilia concerning for this and I reviewed his CXR images - there are bilateral infiltrates.\"    ID Progress Notes : Dr. Saleh  \"Suspect daptomycin induced pneumonitis...peripheral eosinophilia..\"    Treatment:  -Stop daptomycin  -O2 2-3L: -    Risk Factors: bacteremia, ESRD  Options provided:  -- Daptomycin induced pulmonary toxicity with peripheral eosinophilia and bilateral infiltrates  -- Daptomycin induced pneumonitis  -- Other - I will add my own diagnosis  -- Refer to Clinical Documentation Reviewer    Query created by: Jayna Mckinnon on 2024 2:04 PM      Electronically signed by:  HEAVENLY LOCKHART MD " 6/6/2024 2:11 PM

## 2024-06-08 ENCOUNTER — TELEPHONE (OUTPATIENT)
Dept: DIALYSIS | Facility: HOSPITAL | Age: 39
End: 2024-06-08

## 2024-07-22 ENCOUNTER — APPOINTMENT (OUTPATIENT)
Dept: RADIOLOGY | Facility: HOSPITAL | Age: 39
End: 2024-07-22
Payer: COMMERCIAL

## 2024-07-22 ENCOUNTER — CLINICAL SUPPORT (OUTPATIENT)
Dept: EMERGENCY MEDICINE | Facility: HOSPITAL | Age: 39
End: 2024-07-22
Payer: COMMERCIAL

## 2024-07-22 ENCOUNTER — HOSPITAL ENCOUNTER (INPATIENT)
Facility: HOSPITAL | Age: 39
End: 2024-07-22
Attending: STUDENT IN AN ORGANIZED HEALTH CARE EDUCATION/TRAINING PROGRAM | Admitting: STUDENT IN AN ORGANIZED HEALTH CARE EDUCATION/TRAINING PROGRAM
Payer: COMMERCIAL

## 2024-07-22 DIAGNOSIS — M00.032: ICD-10-CM

## 2024-07-22 DIAGNOSIS — R94.2 ABNORMAL RESULTS OF PULMONARY FUNCTION STUDIES: ICD-10-CM

## 2024-07-22 DIAGNOSIS — I33.0 TRICUSPID VALVE VEGETATION (HHS-HCC): ICD-10-CM

## 2024-07-22 DIAGNOSIS — E03.9 HYPOTHYROIDISM, UNSPECIFIED TYPE: ICD-10-CM

## 2024-07-22 DIAGNOSIS — E10.69 TYPE 1 DIABETES MELLITUS WITH OTHER SPECIFIED COMPLICATION (MULTI): ICD-10-CM

## 2024-07-22 DIAGNOSIS — I95.9 HYPOTENSION, UNSPECIFIED HYPOTENSION TYPE: ICD-10-CM

## 2024-07-22 DIAGNOSIS — E86.1 HYPOVOLEMIA: ICD-10-CM

## 2024-07-22 DIAGNOSIS — I33.0 ACUTE INFECTIVE ENDOCARDITIS, DUE TO UNSPECIFIED ORGANISM (HHS-HCC): ICD-10-CM

## 2024-07-22 DIAGNOSIS — T81.30XA WOUND DEHISCENCE: ICD-10-CM

## 2024-07-22 DIAGNOSIS — M79.89 OTHER SPECIFIED SOFT TISSUE DISORDERS: ICD-10-CM

## 2024-07-22 DIAGNOSIS — Z86.718 HISTORY OF BLOOD CLOTS: ICD-10-CM

## 2024-07-22 DIAGNOSIS — A41.9 SEPTIC SHOCK (MULTI): Primary | ICD-10-CM

## 2024-07-22 DIAGNOSIS — Z99.2 ESRD (END STAGE RENAL DISEASE) ON DIALYSIS (MULTI): ICD-10-CM

## 2024-07-22 DIAGNOSIS — K59.00 CONSTIPATION, UNSPECIFIED CONSTIPATION TYPE: ICD-10-CM

## 2024-07-22 DIAGNOSIS — I33.9 ACUTE AND SUBACUTE ENDOCARDITIS, UNSPECIFIED (HHS-HCC): ICD-10-CM

## 2024-07-22 DIAGNOSIS — R65.21 SEPTIC SHOCK (MULTI): Primary | ICD-10-CM

## 2024-07-22 DIAGNOSIS — M00.9: ICD-10-CM

## 2024-07-22 DIAGNOSIS — N18.6 ESRD (END STAGE RENAL DISEASE) ON DIALYSIS (MULTI): ICD-10-CM

## 2024-07-22 DIAGNOSIS — R52 PAIN: ICD-10-CM

## 2024-07-22 DIAGNOSIS — B36.9 FUNGAL SKIN INFECTION: ICD-10-CM

## 2024-07-22 DIAGNOSIS — K21.01 GASTROESOPHAGEAL REFLUX DISEASE WITH ESOPHAGITIS AND HEMORRHAGE: ICD-10-CM

## 2024-07-22 PROBLEM — N17.9 AKI (ACUTE KIDNEY INJURY) (CMS-HCC): Status: ACTIVE | Noted: 2024-07-22

## 2024-07-22 LAB
ABO GROUP (TYPE) IN BLOOD: NORMAL
ALBUMIN SERPL BCP-MCNC: 2.2 G/DL (ref 3.4–5)
ALP SERPL-CCNC: 128 U/L (ref 33–120)
ALT SERPL W P-5'-P-CCNC: <3 U/L (ref 10–52)
ANION GAP BLDV CALCULATED.4IONS-SCNC: 13 MMOL/L (ref 10–25)
ANION GAP BLDV CALCULATED.4IONS-SCNC: 13 MMOL/L (ref 10–25)
ANION GAP SERPL CALC-SCNC: 16 MMOL/L (ref 10–20)
ANTIBODY SCREEN: NORMAL
AST SERPL W P-5'-P-CCNC: 11 U/L (ref 9–39)
BASE EXCESS BLDV CALC-SCNC: -1.5 MMOL/L (ref -2–3)
BASE EXCESS BLDV CALC-SCNC: 0.2 MMOL/L (ref -2–3)
BASOPHILS # BLD AUTO: 0.06 X10*3/UL (ref 0–0.1)
BASOPHILS NFR BLD AUTO: 0.3 %
BILIRUB SERPL-MCNC: 0.8 MG/DL (ref 0–1.2)
BODY TEMPERATURE: 37 DEGREES CELSIUS
BODY TEMPERATURE: 37 DEGREES CELSIUS
BUN SERPL-MCNC: 45 MG/DL (ref 6–23)
CA-I BLDV-SCNC: 1.23 MMOL/L (ref 1.1–1.33)
CA-I BLDV-SCNC: 1.26 MMOL/L (ref 1.1–1.33)
CALCIUM SERPL-MCNC: 8.6 MG/DL (ref 8.6–10.6)
CARDIAC TROPONIN I PNL SERPL HS: 6 NG/L (ref 0–53)
CHLORIDE BLDV-SCNC: 91 MMOL/L (ref 98–107)
CHLORIDE BLDV-SCNC: 93 MMOL/L (ref 98–107)
CHLORIDE SERPL-SCNC: 92 MMOL/L (ref 98–107)
CO2 SERPL-SCNC: 23 MMOL/L (ref 21–32)
CREAT SERPL-MCNC: 6.88 MG/DL (ref 0.5–1.3)
EGFRCR SERPLBLD CKD-EPI 2021: 10 ML/MIN/1.73M*2
EOSINOPHIL # BLD AUTO: 0.02 X10*3/UL (ref 0–0.7)
EOSINOPHIL NFR BLD AUTO: 0.1 %
ERYTHROCYTE [DISTWIDTH] IN BLOOD BY AUTOMATED COUNT: 16.7 % (ref 11.5–14.5)
GLUCOSE BLD MANUAL STRIP-MCNC: 259 MG/DL (ref 74–99)
GLUCOSE BLD MANUAL STRIP-MCNC: 261 MG/DL (ref 74–99)
GLUCOSE BLDV-MCNC: 134 MG/DL (ref 74–99)
GLUCOSE BLDV-MCNC: 287 MG/DL (ref 74–99)
GLUCOSE SERPL-MCNC: 122 MG/DL (ref 74–99)
HCO3 BLDV-SCNC: 22.7 MMOL/L (ref 22–26)
HCO3 BLDV-SCNC: 24.6 MMOL/L (ref 22–26)
HCT VFR BLD AUTO: 19.9 % (ref 41–52)
HCT VFR BLD EST: 24 % (ref 41–52)
HCT VFR BLD EST: 25 % (ref 41–52)
HGB BLD-MCNC: 7.3 G/DL (ref 13.5–17.5)
HGB BLDV-MCNC: 7.9 G/DL (ref 13.5–17.5)
HGB BLDV-MCNC: 8.2 G/DL (ref 13.5–17.5)
HOLD SPECIMEN: NORMAL
IMM GRANULOCYTES # BLD AUTO: 0.24 X10*3/UL (ref 0–0.7)
IMM GRANULOCYTES NFR BLD AUTO: 1.2 % (ref 0–0.9)
INHALED O2 CONCENTRATION: 21 %
LACTATE BLDV-SCNC: 1.1 MMOL/L (ref 0.4–2)
LACTATE BLDV-SCNC: 3.3 MMOL/L (ref 0.4–2)
LYMPHOCYTES # BLD AUTO: 1.11 X10*3/UL (ref 1.2–4.8)
LYMPHOCYTES NFR BLD AUTO: 5.6 %
MAGNESIUM SERPL-MCNC: 1.84 MG/DL (ref 1.6–2.4)
MCH RBC QN AUTO: 28.2 PG (ref 26–34)
MCHC RBC AUTO-ENTMCNC: 36.7 G/DL (ref 32–36)
MCV RBC AUTO: 77 FL (ref 80–100)
MONOCYTES # BLD AUTO: 1.29 X10*3/UL (ref 0.1–1)
MONOCYTES NFR BLD AUTO: 6.5 %
NEUTROPHILS # BLD AUTO: 17.27 X10*3/UL (ref 1.2–7.7)
NEUTROPHILS NFR BLD AUTO: 86.3 %
NRBC BLD-RTO: 0 /100 WBCS (ref 0–0)
OXYHGB MFR BLDV: 38.1 % (ref 45–75)
OXYHGB MFR BLDV: 57 % (ref 45–75)
PCO2 BLDV: 35 MM HG (ref 41–51)
PCO2 BLDV: 38 MM HG (ref 41–51)
PH BLDV: 7.42 PH (ref 7.33–7.43)
PH BLDV: 7.42 PH (ref 7.33–7.43)
PHOSPHATE SERPL-MCNC: 2.5 MG/DL (ref 2.5–4.9)
PLATELET # BLD AUTO: 65 X10*3/UL (ref 150–450)
PO2 BLDV: 28 MM HG (ref 35–45)
PO2 BLDV: 36 MM HG (ref 35–45)
POLYCHROMASIA BLD QL SMEAR: NORMAL
POTASSIUM BLDV-SCNC: 3.1 MMOL/L (ref 3.5–5.3)
POTASSIUM BLDV-SCNC: 3.4 MMOL/L (ref 3.5–5.3)
POTASSIUM SERPL-SCNC: 3.1 MMOL/L (ref 3.5–5.3)
PROT SERPL-MCNC: 5.7 G/DL (ref 6.4–8.2)
RBC # BLD AUTO: 2.59 X10*6/UL (ref 4.5–5.9)
RBC MORPH BLD: NORMAL
RH FACTOR (ANTIGEN D): NORMAL
SAO2 % BLDV: 39 % (ref 45–75)
SAO2 % BLDV: 59 % (ref 45–75)
SODIUM BLDV-SCNC: 123 MMOL/L (ref 136–145)
SODIUM BLDV-SCNC: 127 MMOL/L (ref 136–145)
SODIUM SERPL-SCNC: 128 MMOL/L (ref 136–145)
TARGETS BLD QL SMEAR: NORMAL
WBC # BLD AUTO: 20 X10*3/UL (ref 4.4–11.3)

## 2024-07-22 PROCEDURE — 2500000001 HC RX 250 WO HCPCS SELF ADMINISTERED DRUGS (ALT 637 FOR MEDICARE OP): Mod: SE

## 2024-07-22 PROCEDURE — 84132 ASSAY OF SERUM POTASSIUM: CPT

## 2024-07-22 PROCEDURE — 96374 THER/PROPH/DIAG INJ IV PUSH: CPT | Mod: 59

## 2024-07-22 PROCEDURE — 87040 BLOOD CULTURE FOR BACTERIA: CPT

## 2024-07-22 PROCEDURE — 73610 X-RAY EXAM OF ANKLE: CPT | Mod: RT

## 2024-07-22 PROCEDURE — 73630 X-RAY EXAM OF FOOT: CPT | Mod: RIGHT SIDE | Performed by: RADIOLOGY

## 2024-07-22 PROCEDURE — 83735 ASSAY OF MAGNESIUM: CPT

## 2024-07-22 PROCEDURE — 2500000002 HC RX 250 W HCPCS SELF ADMINISTERED DRUGS (ALT 637 FOR MEDICARE OP, ALT 636 FOR OP/ED)

## 2024-07-22 PROCEDURE — 84100 ASSAY OF PHOSPHORUS: CPT

## 2024-07-22 PROCEDURE — 99291 CRITICAL CARE FIRST HOUR: CPT | Performed by: EMERGENCY MEDICINE

## 2024-07-22 PROCEDURE — 84484 ASSAY OF TROPONIN QUANT: CPT

## 2024-07-22 PROCEDURE — 87389 HIV-1 AG W/HIV-1&-2 AB AG IA: CPT

## 2024-07-22 PROCEDURE — 2500000004 HC RX 250 GENERAL PHARMACY W/ HCPCS (ALT 636 FOR OP/ED)

## 2024-07-22 PROCEDURE — 2020000001 HC ICU ROOM DAILY

## 2024-07-22 PROCEDURE — 83615 LACTATE (LD) (LDH) ENZYME: CPT

## 2024-07-22 PROCEDURE — 85025 COMPLETE CBC W/AUTO DIFF WBC: CPT

## 2024-07-22 PROCEDURE — 96367 TX/PROPH/DG ADDL SEQ IV INF: CPT

## 2024-07-22 PROCEDURE — 2500000004 HC RX 250 GENERAL PHARMACY W/ HCPCS (ALT 636 FOR OP/ED): Performed by: EMERGENCY MEDICINE

## 2024-07-22 PROCEDURE — 71045 X-RAY EXAM CHEST 1 VIEW: CPT | Mod: FOREIGN READ | Performed by: RADIOLOGY

## 2024-07-22 PROCEDURE — 86923 COMPATIBILITY TEST ELECTRIC: CPT

## 2024-07-22 PROCEDURE — 71045 X-RAY EXAM CHEST 1 VIEW: CPT

## 2024-07-22 PROCEDURE — 82947 ASSAY GLUCOSE BLOOD QUANT: CPT

## 2024-07-22 PROCEDURE — 2500000005 HC RX 250 GENERAL PHARMACY W/O HCPCS: Mod: SE

## 2024-07-22 PROCEDURE — 73610 X-RAY EXAM OF ANKLE: CPT | Mod: RIGHT SIDE | Performed by: RADIOLOGY

## 2024-07-22 PROCEDURE — 86901 BLOOD TYPING SEROLOGIC RH(D): CPT

## 2024-07-22 PROCEDURE — 96365 THER/PROPH/DIAG IV INF INIT: CPT | Mod: 59

## 2024-07-22 PROCEDURE — 87205 SMEAR GRAM STAIN: CPT

## 2024-07-22 PROCEDURE — 93308 TTE F-UP OR LMTD: CPT | Performed by: EMERGENCY MEDICINE

## 2024-07-22 PROCEDURE — 36415 COLL VENOUS BLD VENIPUNCTURE: CPT

## 2024-07-22 PROCEDURE — 85610 PROTHROMBIN TIME: CPT

## 2024-07-22 PROCEDURE — 3E033XZ INTRODUCTION OF VASOPRESSOR INTO PERIPHERAL VEIN, PERCUTANEOUS APPROACH: ICD-10-PCS | Performed by: EMERGENCY MEDICINE

## 2024-07-22 PROCEDURE — 93010 ELECTROCARDIOGRAM REPORT: CPT | Performed by: EMERGENCY MEDICINE

## 2024-07-22 PROCEDURE — 93005 ELECTROCARDIOGRAM TRACING: CPT

## 2024-07-22 PROCEDURE — 2500000001 HC RX 250 WO HCPCS SELF ADMINISTERED DRUGS (ALT 637 FOR MEDICARE OP)

## 2024-07-22 PROCEDURE — 73630 X-RAY EXAM OF FOOT: CPT | Mod: RT

## 2024-07-22 RX ORDER — LANOLIN ALCOHOL/MO/W.PET/CERES
200 CREAM (GRAM) TOPICAL DAILY
COMMUNITY

## 2024-07-22 RX ORDER — NOREPINEPHRINE BITARTRATE/D5W 8 MG/250ML
.01-1 PLASTIC BAG, INJECTION (ML) INTRAVENOUS CONTINUOUS
Status: DISCONTINUED | OUTPATIENT
Start: 2024-07-22 | End: 2024-07-25

## 2024-07-22 RX ORDER — POTASSIUM CHLORIDE 20 MEQ/1
40 TABLET, EXTENDED RELEASE ORAL ONCE
Status: DISCONTINUED | OUTPATIENT
Start: 2024-07-22 | End: 2024-07-22

## 2024-07-22 RX ORDER — INSULIN GLARGINE 100 [IU]/ML
4 INJECTION, SOLUTION SUBCUTANEOUS NIGHTLY
Status: DISCONTINUED | OUTPATIENT
Start: 2024-07-22 | End: 2024-07-24

## 2024-07-22 RX ORDER — ASPIRIN 325 MG
50000 TABLET, DELAYED RELEASE (ENTERIC COATED) ORAL 2 TIMES WEEKLY
Status: DISCONTINUED | OUTPATIENT
Start: 2024-07-22 | End: 2024-08-21

## 2024-07-22 RX ORDER — ASPIRIN 325 MG
50000 TABLET, DELAYED RELEASE (ENTERIC COATED) ORAL 2 TIMES WEEKLY
COMMUNITY
End: 2024-08-22 | Stop reason: HOSPADM

## 2024-07-22 RX ORDER — LANOLIN ALCOHOL/MO/W.PET/CERES
200 CREAM (GRAM) TOPICAL DAILY
Status: DISCONTINUED | OUTPATIENT
Start: 2024-07-23 | End: 2024-08-22 | Stop reason: HOSPADM

## 2024-07-22 RX ORDER — POTASSIUM CHLORIDE 14.9 MG/ML
20 INJECTION INTRAVENOUS ONCE
Status: DISCONTINUED | OUTPATIENT
Start: 2024-07-23 | End: 2024-07-22

## 2024-07-22 RX ORDER — DEXTROSE 50 % IN WATER (D50W) INTRAVENOUS SYRINGE
12.5
Status: DISCONTINUED | OUTPATIENT
Start: 2024-07-22 | End: 2024-08-22 | Stop reason: HOSPADM

## 2024-07-22 RX ORDER — INSULIN LISPRO 100 [IU]/ML
0-10 INJECTION, SOLUTION INTRAVENOUS; SUBCUTANEOUS
Status: DISCONTINUED | OUTPATIENT
Start: 2024-07-23 | End: 2024-07-24

## 2024-07-22 RX ORDER — DEXTROSE 50 % IN WATER (D50W) INTRAVENOUS SYRINGE
25
Status: DISCONTINUED | OUTPATIENT
Start: 2024-07-22 | End: 2024-08-22 | Stop reason: HOSPADM

## 2024-07-22 RX ORDER — VANCOMYCIN HYDROCHLORIDE 1 G/20ML
INJECTION, POWDER, LYOPHILIZED, FOR SOLUTION INTRAVENOUS DAILY PRN
Status: DISCONTINUED | OUTPATIENT
Start: 2024-07-22 | End: 2024-08-22 | Stop reason: HOSPADM

## 2024-07-22 RX ORDER — INSULIN GLARGINE 100 [IU]/ML
6 INJECTION, SOLUTION SUBCUTANEOUS EVERY 24 HOURS
OUTPATIENT
Start: 2024-07-22

## 2024-07-22 RX ORDER — ATORVASTATIN CALCIUM 40 MG/1
40 TABLET, FILM COATED ORAL NIGHTLY
Status: DISCONTINUED | OUTPATIENT
Start: 2024-07-22 | End: 2024-08-21

## 2024-07-22 RX ORDER — MIDODRINE HYDROCHLORIDE 10 MG/1
10 TABLET ORAL EVERY 4 HOURS PRN
COMMUNITY
End: 2024-08-22 | Stop reason: HOSPADM

## 2024-07-22 RX ORDER — ATORVASTATIN CALCIUM 40 MG/1
40 TABLET, FILM COATED ORAL NIGHTLY
COMMUNITY
End: 2024-08-22 | Stop reason: HOSPADM

## 2024-07-22 RX ORDER — NOREPINEPHRINE BITARTRATE/D5W 8 MG/250ML
PLASTIC BAG, INJECTION (ML) INTRAVENOUS
Status: COMPLETED
Start: 2024-07-22 | End: 2024-07-22

## 2024-07-22 RX ORDER — POTASSIUM CHLORIDE 20 MEQ/1
20 TABLET, EXTENDED RELEASE ORAL ONCE
Status: DISCONTINUED | OUTPATIENT
Start: 2024-07-23 | End: 2024-07-22

## 2024-07-22 RX ORDER — L. ACIDOPHILUS/L.BULGARICUS 1MM CELL
1 TABLET ORAL 2 TIMES DAILY
Status: DISCONTINUED | OUTPATIENT
Start: 2024-07-22 | End: 2024-07-24

## 2024-07-22 RX ORDER — LANOLIN ALCOHOL/MO/W.PET/CERES
400 CREAM (GRAM) TOPICAL ONCE
Status: COMPLETED | OUTPATIENT
Start: 2024-07-22 | End: 2024-07-22

## 2024-07-22 RX ORDER — PANTOPRAZOLE SODIUM 40 MG/1
40 TABLET, DELAYED RELEASE ORAL
Status: DISCONTINUED | OUTPATIENT
Start: 2024-07-23 | End: 2024-07-31

## 2024-07-22 RX ORDER — TALC
6 POWDER (GRAM) TOPICAL NIGHTLY
COMMUNITY

## 2024-07-22 RX ORDER — INSULIN GLARGINE 100 [IU]/ML
6 INJECTION, SOLUTION SUBCUTANEOUS EVERY 24 HOURS
Status: DISCONTINUED | OUTPATIENT
Start: 2024-07-22 | End: 2024-07-22

## 2024-07-22 RX ORDER — VANCOMYCIN HYDROCHLORIDE 1 G/200ML
1000 INJECTION, SOLUTION INTRAVENOUS ONCE
Status: COMPLETED | OUTPATIENT
Start: 2024-07-22 | End: 2024-07-22

## 2024-07-22 RX ORDER — FOLIC ACID 1 MG/1
1 TABLET ORAL DAILY
OUTPATIENT
Start: 2024-07-23

## 2024-07-22 RX ORDER — MIDODRINE HYDROCHLORIDE 10 MG/1
10 TABLET ORAL EVERY 4 HOURS
Status: DISCONTINUED | OUTPATIENT
Start: 2024-07-22 | End: 2024-07-22

## 2024-07-22 RX ORDER — LEVOTHYROXINE SODIUM 125 UG/1
125 TABLET ORAL
Status: DISCONTINUED | OUTPATIENT
Start: 2024-07-23 | End: 2024-08-04

## 2024-07-22 RX ORDER — WITCH HAZEL 50 %
1 PADS, MEDICATED (EA) TOPICAL 2 TIMES DAILY
COMMUNITY
End: 2024-08-22 | Stop reason: HOSPADM

## 2024-07-22 RX ORDER — ASPIRIN 325 MG
50000 TABLET, DELAYED RELEASE (ENTERIC COATED) ORAL 2 TIMES WEEKLY
OUTPATIENT
Start: 2024-07-22

## 2024-07-22 RX ORDER — FOLIC ACID 1 MG/1
1 TABLET ORAL DAILY
Status: DISCONTINUED | OUTPATIENT
Start: 2024-07-23 | End: 2024-08-22 | Stop reason: HOSPADM

## 2024-07-22 RX ORDER — INSULIN LISPRO 100 [IU]/ML
2-10 INJECTION, SOLUTION INTRAVENOUS; SUBCUTANEOUS
Status: DISCONTINUED | OUTPATIENT
Start: 2024-07-23 | End: 2024-07-22

## 2024-07-22 RX ORDER — SEVELAMER CARBONATE 800 MG/1
2400 TABLET, FILM COATED ORAL
Status: DISCONTINUED | OUTPATIENT
Start: 2024-07-23 | End: 2024-07-25 | Stop reason: WASHOUT

## 2024-07-22 RX ORDER — TALC
6 POWDER (GRAM) TOPICAL NIGHTLY
OUTPATIENT
Start: 2024-07-22

## 2024-07-22 RX ORDER — VANCOMYCIN HYDROCHLORIDE 1 G/20ML
INJECTION, POWDER, LYOPHILIZED, FOR SOLUTION INTRAVENOUS DAILY PRN
Status: DISCONTINUED | OUTPATIENT
Start: 2024-07-22 | End: 2024-07-22 | Stop reason: SDUPTHER

## 2024-07-22 RX ORDER — LEVOTHYROXINE SODIUM 125 UG/1
125 TABLET ORAL
OUTPATIENT
Start: 2024-07-23

## 2024-07-22 RX ORDER — MIDODRINE HYDROCHLORIDE 10 MG/1
10 TABLET ORAL EVERY 8 HOURS
Status: DISCONTINUED | OUTPATIENT
Start: 2024-07-23 | End: 2024-07-31

## 2024-07-22 RX ORDER — TALC
6 POWDER (GRAM) TOPICAL NIGHTLY
Status: DISCONTINUED | OUTPATIENT
Start: 2024-07-22 | End: 2024-08-22 | Stop reason: HOSPADM

## 2024-07-22 RX ORDER — FOLIC ACID 1 MG/1
1 TABLET ORAL DAILY
COMMUNITY

## 2024-07-22 RX ORDER — POTASSIUM CHLORIDE 20 MEQ/1
20 TABLET, EXTENDED RELEASE ORAL ONCE
Status: DISCONTINUED | OUTPATIENT
Start: 2024-07-22 | End: 2024-07-23

## 2024-07-22 RX ORDER — HEPARIN SODIUM 5000 [USP'U]/ML
5000 INJECTION, SOLUTION INTRAVENOUS; SUBCUTANEOUS EVERY 8 HOURS
Status: DISCONTINUED | OUTPATIENT
Start: 2024-07-22 | End: 2024-07-23

## 2024-07-22 RX ORDER — PANTOPRAZOLE SODIUM 40 MG/1
40 TABLET, DELAYED RELEASE ORAL
OUTPATIENT
Start: 2024-07-23

## 2024-07-22 RX ORDER — INSULIN GLARGINE 100 [IU]/ML
8 INJECTION, SOLUTION SUBCUTANEOUS NIGHTLY
Status: ON HOLD | COMMUNITY
End: 2024-08-21

## 2024-07-22 RX ORDER — LANOLIN ALCOHOL/MO/W.PET/CERES
200 CREAM (GRAM) TOPICAL DAILY
OUTPATIENT
Start: 2024-07-23

## 2024-07-22 RX ORDER — SEVELAMER CARBONATE 800 MG/1
2400 TABLET, FILM COATED ORAL
OUTPATIENT
Start: 2024-07-23

## 2024-07-22 RX ORDER — ATORVASTATIN CALCIUM 40 MG/1
40 TABLET, FILM COATED ORAL NIGHTLY
OUTPATIENT
Start: 2024-07-22

## 2024-07-22 RX ORDER — POTASSIUM CHLORIDE 14.9 MG/ML
20 INJECTION INTRAVENOUS ONCE
Status: COMPLETED | OUTPATIENT
Start: 2024-07-22 | End: 2024-07-23

## 2024-07-22 RX ORDER — WITCH HAZEL 50 %
1 PADS, MEDICATED (EA) TOPICAL 2 TIMES DAILY
OUTPATIENT
Start: 2024-07-22

## 2024-07-22 ASSESSMENT — PAIN SCALES - GENERAL
PAINLEVEL_OUTOF10: 0 - NO PAIN

## 2024-07-22 ASSESSMENT — PAIN SCALES - WONG BAKER: WONGBAKER_NUMERICALRESPONSE: NO HURT

## 2024-07-22 ASSESSMENT — LIFESTYLE VARIABLES
EVER FELT BAD OR GUILTY ABOUT YOUR DRINKING: NO
HAVE YOU EVER FELT YOU SHOULD CUT DOWN ON YOUR DRINKING: NO
EVER HAD A DRINK FIRST THING IN THE MORNING TO STEADY YOUR NERVES TO GET RID OF A HANGOVER: NO
TOTAL SCORE: 0
HAVE PEOPLE ANNOYED YOU BY CRITICIZING YOUR DRINKING: NO

## 2024-07-22 ASSESSMENT — PAIN - FUNCTIONAL ASSESSMENT: PAIN_FUNCTIONAL_ASSESSMENT: 0-10

## 2024-07-22 ASSESSMENT — COLUMBIA-SUICIDE SEVERITY RATING SCALE - C-SSRS
6. HAVE YOU EVER DONE ANYTHING, STARTED TO DO ANYTHING, OR PREPARED TO DO ANYTHING TO END YOUR LIFE?: NO
2. HAVE YOU ACTUALLY HAD ANY THOUGHTS OF KILLING YOURSELF?: NO
1. IN THE PAST MONTH, HAVE YOU WISHED YOU WERE DEAD OR WISHED YOU COULD GO TO SLEEP AND NOT WAKE UP?: NO

## 2024-07-22 NOTE — PROGRESS NOTES
Patient was handed off to me from the previous team. For full history, physical, and prior ED course, please see previous provider note prior to patient handoff. This is an addendum to the record.    HPI/prior hospital course:   In brief, patient is a 39-year-old male with past medical history of ESRD on dialysis Monday/Wednesday/Friday via right femoral line presenting with concern for hypotension.  Reportedly has baseline systolic blood pressure in the 120s to 130s at the facility.  Sent in today as he was having diffuse malaise and blood pressures in the 70s/40s.  Is on scheduled midodrine every 4 hours and been taking this.  Did have dialysis recently where they took off more fluid than usual and dropped him below his dry weight.  Prior workup notable for tachycardia and leukocytosis but otherwise benign abdominal exam, afebrile, no focal infectious symptoms. Norepinephrine peaked at 1.Patient received 1.5 L of IV fluids prior to shift change.  Hospital Course/MDM:  On initial assessment after shift change at approximately 3 PM, patient continued to require norepinephrine at 0.04.  Did have a fully collapsed IVC and treated with additional 500 mL fluid.  On reassessment at 1850, patient had IVC that fully collapsed with respiration and was between 5 to 9 mm otherwise.  Given additional 500 mL bolus.  Bed became available in MICU at this time and patient admitted for further management.  Central line deferred at this time given impression that patient was weaning pressor requirements, had only had 8 hours of peripheral norepinephrine and additionally there is significant concern about patient's access with reported bilateral IJ stenosis    Disposition:  Admitted to MICU    Patient seen and discussed with Dr. Sharif Stapleton MD, PhD  Emergency Medicine PGY2

## 2024-07-22 NOTE — Clinical Note
Single view of the left femoral artery obtained using hand injection. Yary,  Your TSH is above target range. The TSH target range for first trimester pregnancy is 2.5 or less.  I am increasing your levothyroxine to 112 mcg per day.  I'll send a new prescription to your pharmacy.  Let's plan to repeat the labs in 4 weeks.  Azalea Burk NP  Endocrinology

## 2024-07-22 NOTE — PROGRESS NOTES
Pharmacy Medication History Review    Edwar Hemphill is a 39 y.o. male admitted for Septic shock (Multi). Pharmacy reviewed the patient's [id number] medications and allergies for accuracy.    The list below reflectives the updated PTA list. Please review each medication in order reconciliation for additional clarification and justification.  Prior to Admission Medications   Prescriptions Last Dose Informant Patient Reported?    B complex-vitamin C-folic acid (Nephrocaps) 1 mg capsule Unknown  Yes    Sig: Take 1 capsule by mouth once daily.   Lactobacillus acidophilus 1 billion cell tablet Unknown  Yes    Sig: Take 1 tablet by mouth 2 times a day.   atorvastatin (Lipitor) 40 mg tablet Unknown  Yes    Sig: Take 1 tablet (40 mg) by mouth once daily at bedtime.   cholecalciferol (Vitamin D-3) 50,000 unit capsule Unknown  Yes    Sig: Take 1 capsule (50,000 Units) by mouth 2 times a week. On Monday and Thursday   epoetin tyson-epbx (Retacrit) 10,000 unit/mL injection Unknown  No    Sig: Inject 1 mL (10,000 Units) under the skin 3 times a week.   folic acid (Folvite) 1 mg tablet Unknown  Yes    Sig: Take 1 tablet (1 mg) by mouth once daily.   insulin glargine (Lantus U-100 Insulin) 100 unit/mL injection Unknown  Yes    Sig: Inject 8 Units under the skin once daily at bedtime. Take as directed per insulin instructions.   insulin lispro (HumaLOG) 100 unit/mL injection Unknown  No    Sig: Inject 0-0.15 mL (0-15 Units) under the skin 3 times a day with meals. Take as directed per insulin instructions.   Patient taking differently: Inject 2-10 Units under the skin 3 times daily (morning, midday, late afternoon). If 151-200= 2 units  201-250= 4 units  251-300= 6 units   301-350= 8 units  351-400= 10 units  >400 notify provider   levothyroxine (Synthroid, Levoxyl) 125 mcg tablet Unknown  Yes    Sig: Take 1 tablet (125 mcg) by mouth once daily in the morning. Take before meals.   loperamide (Imodium) 2 mg capsule Unknown  Yes     Sig: Take 2 capsules (4 mg) by mouth every 2 hours if needed for diarrhea (FOR LOOSE STOLLS. 2 TABLETS AFTER EVERY EPISODE. DO NOT EXCEED 16MG TOTAL IN 24 HOURS.).   melatonin 3 mg tablet Unknown  Yes    Sig: Take 2 tablets (6 mg) by mouth once daily at bedtime.   midodrine (Proamatine) 10 mg tablet Unknown  Yes    Sig: Take 1 tablet (10 mg) by mouth every 4 hours if needed (hypotension).   oxyCODONE-acetaminophen (Percocet) 5-325 mg tablet Unknown  No    Sig: Take 1 tablet by mouth every 6 hours if needed for severe pain (7 - 10).   pantoprazole (ProtoNix) 40 mg EC tablet Unknown  No    Sig: Take 1 tablet (40 mg) by mouth once daily in the morning. Take before meals. Do not crush, chew, or split. Do not start before November 17, 2023.   sevelamer carbonate (Renvela) 800 mg tablet Unknown  Yes    Sig: Take 3 tablets (2,400 mg) by mouth 3 times daily (morning, midday, late afternoon).   sodium zirconium cyclosilicate (Lokelma) 5 gram packet Unknown  Yes    Sig: Take 5 g by mouth 3 times a week. On Tuesday, Thursday and Saturday   thiamine 100 mg tablet Unknown  Yes    Sig: Take 2 tablets (200 mg) by mouth once daily.      Facility-Administered Medications: None         The list below reflectives the updated allergy list. Please review each documented allergy for additional clarification and justification.  Allergies  Reviewed by Dany Roberts RN on 7/22/2024        Severity Reactions Comments    Cashew Nut High Anaphylaxis, Swelling cashews            Below are additional concerns with the patient's PTA list.  Updated medication list using information provided by [facility]. Of note, midodrine dose at facility was increased from TID to every 4 hours PRN hypotension. Lantus dose was increased from 2 units daily to 8 units daily.     Cielo Saeed, PharmD, BCCCP

## 2024-07-22 NOTE — ED PROVIDER NOTES
History of Present Illness     History provided by: Patient and EMS  Limitations to History: None  External Records Reviewed with Brief Summary:  Patient is chronically ill individual with significant comorbidities including ESRD on dialysis complicated by line infections with multiresistant organisms chronic vasoplegia on midodrine has had multiple ICU admissions for sepsis.    HPI:  Edwar Hemphill is a 39 y.o. male  w PMH significant for ESRD (MWF w right femoral HD line), NICM, HFrEF (LVEF 35-40% on 2024 POLI), anemia, DM2, Left AKA, RUE amputation, HTN, PVD, GERD who presented from SNF after 3 days of hypotension not responsive to home midodrine with associated lightheadedness. Patient reports that last HD session on Friday they took excess fluid off from and he was below his dry weight. Did not dialyze today. He presents as a critical to the ED with acute hypotension 70s/40s, responsive to fluids.  Patient endorses malaise, decreased p.o. intake denies nausea, vomiting, fever, cough. Does not make urine    Physical Exam   Triage vitals:  T    HR (!) 106  BP (!) 68/41  RR 16  O2 96 % None (Room air)    General: Awake, alert, in no acute distress. Appears chronically ill, ashen  Eyes: Gaze conjugate.  No scleral icterus or injection. Sunken orbits  HENT: Normo-cephalic, atraumatic. No stridor  CV: Tachycardic rate, regular rhythm. Radial pulses 2+ LUE, PT/DP 2+ RLE   Resp: Breathing non-labored, speaking in full sentences.  Clear to auscultation bilaterally  GI: Soft, non-distended, non-tender. No rebound or guarding.  : no acute lesions, erythema.  MSK/Extremities: RUE/LLE amputations. RLE with chronic wound to heel.  Skin: cool extremities. ashen color, R heel wound with ulceration.  Neuro: Alert. Oriented. Face symmetric. Speech is fluent.  Gross strength and sensation intact  Psych: Appropriate mood and affect      Medical Decision Making & ED Course   Medical Decision Makin y.o. male who presents  as a critical hypotensive in shock.  Patient has multiple medical comorbidities including ESRD, he had been dialyzed last on Friday with extra fluid removed dropping him below his dry weight.  Point-of-care ultrasound was used during initial resuscitation revealing an extremely collapsible IVC, patient was given a liter of LR with immediate improvement in patient's vitals and blood pressure, though patient does remain overall hypotensive.  Patient is cool to the touch with low body temp tachycardic tachypneic hypotensive all concerning for septic shock.  Patient has history of multidrug-resistant organisms MRSA bacteremia has indwelling line.  Blood cultures were drawn and he was initiated on Vanco and Zosyn.  Will obtain chest x-ray as well as x-rays of the right foot to assess for underlying osteomyelitis as a potential septic source.  Site of right groin line does appear clean without surrounding erythema induration or purulence.  Patient is stable on room air no new oxygen requirement.  Patient's initial blood gas reveals lactate of 3.3 treating with fluids.  No acute acidosis.  Initial troponin was 6 and CBC reveals acute leukocytosis to 20 hemoglobin of 7.3 and thrombocytopenia of 65.  CMP reveals hypokalemia elevated creatinine at 6.88 in the setting of ESRD and hyponatremia which may be related to hypovolemia versus chronic disease.  Did not replace potassium in setting of ESRD however we will continue to monitor.  Patient IVC still collapsible on repeat point-of-care ultrasound after liter of fluids and additional 500 was given.  Patient remains hypotensive and was started on pressors, Levophed, for additional blood pressure support.  Home midodrine every 4 hours was continued.  Patient's chest x-ray was independently interpreted with no acute cardiopulmonary process concerning for an acute pneumonia, blood cultures are still pending.  X-ray of the foot and ankle show no underlying fracture or bony changes  concerning for osteomyelitis.  Patient was discussed with the MICU and is to be admitted in the setting of septic/hypovolemic shock  ----    Differential diagnoses considered include but are not limited to: Patient is in shock septic versus hypovolemic versus cardiogenic with potential sources of pneumonia foot wound indwelling lines.     Social Determinants of Health which Significantly Impact Care: Transportation difficulties The following actions were taken to address these social determinants: Patient offered evaluation by Social Work    EKG Independent Interpretation: EKG independently interpreted with sinus rhythm  Heart rate 99 bpm NC interval 176 MS QRS 90 MS QTc prolonged at 508 MS.  No acute ST segment elevations or T wave inversions concerning for acute ischemia.    Independent Result Review and Interpretation: Results were independently reviewed and interpreted by myself. Please see ED course and MDM for full interpretation.    Chronic conditions affecting the patient's care: As documented in the MDM    The patient was discussed with the following consultants/services:  Dr. Ferrell ICU attending who accepted the patient for admission for shock requiring vasopressors.    Care Considerations: As per [facility]    ED Course:  ED Course as of 07/23/24 1910 Mon Jul 22, 2024   1606 Patient was reultrasounded after receiving 1500 cc of IV fluids plus an additional 300 cc from the IV antibiotics.  IVC still was extremely collapsible and difficult to identify.  Images are saved.  Additional IV fluids will be ordered and patient was serially be reevaluated. [WJ]      ED Course User Index  [WJ] Bridger Olguin DO         Diagnoses as of 07/23/24 1910   Septic shock (Multi)   Hypovolemia     Disposition   As a result of their workup, the patient will require admission to the hospital.  The patient was informed of his diagnosis.  The patient was given the opportunity to ask questions and I answered them. The patient  agreed to be admitted to the hospital.    Procedures   Procedures    Patient seen and discussed with ED attending physician.    Gracie Murguia DO  Emergency Medicine     Gracie Murguia DO  Resident  07/23/24 1937

## 2024-07-22 NOTE — CONSULTS
"Vancomycin Dosing by Pharmacy- INITIAL    Edwar Hemphill is a 39 y.o. year old male who Pharmacy has been consulted for vancomycin dosing for other unknown source- line vs wound . Based on the patient's indication and renal status this patient will be dosed based on a goal pre-HD level of 20-25. Patient has a history of MRSA bactermia for which he completed a course of vancomycin in June.    Renal function is currently declining. Patient is on iHD every MWF with last session on 7/19.    Visit Vitals  /80   Pulse (!) 105   Temp 36.3 °C (97.4 °F) (Temporal)   Resp 16   Ht 1.753 m (5' 9.02\")   Wt 68.5 kg (151 lb)   SpO2 96%   BMI 22.29 kg/m²   Smoking Status Every Day   BSA 1.83 m²      Results from last 7 days   Lab Units 07/22/24  1045   BUN mg/dL 45*   CREATININE mg/dL 6.88*   WBC AUTO x10*3/uL 20.0*      Blood Culture   Date/Time Value Ref Range Status   07/22/2024 10:45 AM Loaded on Instrument - Culture in progress  Preliminary   07/22/2024 10:45 AM Loaded on Instrument - Culture in progress  Preliminary     Tissue/Wound Culture/Smear   Date/Time Value Ref Range Status   09/15/2023 01:23 PM   4+~AEROBIC MIXED BACTERIA  Final     Gram Stain   Date/Time Value Ref Range Status   05/17/2024 01:30 PM Gram positive cocci, clusters (AA)  Final     Comment:     Aerobic Bottle Positive   05/17/2024 01:30 PM Gram positive cocci, clusters (AA)  Final     Comment:     Aerobic Bottle Positive     C. difficile Toxin, PCR   Date/Time Value Ref Range Status   09/15/2023 11:36 AM NOT DETECTED Not Detected Final     Comment:      This assay detects the presence of the tcdB (toxin B)   gene via DNA amplification, and results should be   interpreted in the context of the patients history   and clinical findings. This test cannot be performed   on formed stools or used as a test of cure, and should   not be performed more than once per 7 days.           Assessment/Plan     Patient has already been given a loading dose of 2000 mg " in the ED on 7/22  Will dose vancomycin after iHD.  Follow-up level will be ordered prior to the 2nd iHD session unless clinically indicated sooner.  Will continue to monitor renal function daily while on vancomycin and order serum creatinine at least every 48 hours if not already ordered.  Follow for continued vancomycin needs, clinical response, and signs/symptoms of toxicity.       Cielo Saeed, PharmD, BCCCP

## 2024-07-22 NOTE — ED PROCEDURE NOTE
Procedure    Performed by: Bridger Olguin DO  Authorized by: Bridger Olguin DO    Procedure: Cardiac Ultrasound    Findings:   Views: parasternal long, parasternal short, apical four and subxiphoid  The pericardial space was visualized and was NEGATIVE for a significant pericardial effusion.  Activity: Ventricular contractions were visualized.  LV: LV systolic function was DECREASED.      Impression:  Cardiac: The focused cardiac ultrasound exam had ABNORMAL findings as specified.    Comments: Patient IVC extremely collapsible.    Critical Care    Performed by: Bridger Olguin DO  Authorized by: Bridger Olguin DO    Critical care provider statement:     Critical care time (minutes):  32    Critical care time was exclusive of:  Separately billable procedures and treating other patients and teaching time    Critical care was necessary to treat or prevent imminent or life-threatening deterioration of the following conditions:  Sepsis    Critical care was time spent personally by me on the following activities:  Blood draw for specimens, development of treatment plan with patient or surrogate, evaluation of patient's response to treatment, examination of patient, ordering and review of laboratory studies, ordering and performing treatments and interventions, ordering and review of radiographic studies, re-evaluation of patient's condition, review of old charts and interpretation of cardiac output measurements    Care discussed with: admitting provider                 Bridger Olguin DO  07/22/24 [id number]

## 2024-07-22 NOTE — ED TRIAGE NOTES
Pt presents from [facility] for hypotension. Pt.'s systolic BP upon arrival was 68, he was tachy at 106 he was lethargic, alert and oriented. Pt is on hemodialysis with his blast treatment was Friday 07/18/24. 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

## 2024-07-23 ENCOUNTER — APPOINTMENT (OUTPATIENT)
Dept: CARDIOLOGY | Facility: HOSPITAL | Age: 39
End: 2024-07-23
Payer: COMMERCIAL

## 2024-07-23 LAB
ALBUMIN SERPL BCP-MCNC: 1.9 G/DL (ref 3.4–5)
ALBUMIN SERPL BCP-MCNC: 2 G/DL (ref 3.4–5)
ALBUMIN SERPL BCP-MCNC: 2.2 G/DL (ref 3.4–5)
ALP SERPL-CCNC: 107 U/L (ref 33–120)
ALP SERPL-CCNC: 115 U/L (ref 33–120)
ALT SERPL W P-5'-P-CCNC: 4 U/L (ref 10–52)
ALT SERPL W P-5'-P-CCNC: 4 U/L (ref 10–52)
ANION GAP SERPL CALC-SCNC: 17 MMOL/L (ref 10–20)
APTT PPP: 33 SECONDS (ref 27–38)
AST SERPL W P-5'-P-CCNC: 12 U/L (ref 9–39)
AST SERPL W P-5'-P-CCNC: 7 U/L (ref 9–39)
BASOPHILS # BLD AUTO: 0.03 X10*3/UL (ref 0–0.1)
BASOPHILS # BLD AUTO: 0.04 X10*3/UL (ref 0–0.1)
BASOPHILS NFR BLD AUTO: 0.2 %
BASOPHILS NFR BLD AUTO: 0.2 %
BILIRUB SERPL-MCNC: 0.8 MG/DL (ref 0–1.2)
BILIRUB SERPL-MCNC: 0.9 MG/DL (ref 0–1.2)
BODY SURFACE AREA: 1.89 M2
BUN SERPL-MCNC: 43 MG/DL (ref 6–23)
BUN SERPL-MCNC: 44 MG/DL (ref 6–23)
BUN SERPL-MCNC: 46 MG/DL (ref 6–23)
BUN SERPL-MCNC: 47 MG/DL (ref 6–23)
CA-I DIAL FLD-SCNC: 1.19 MMOL/L
CALCIUM SERPL-MCNC: 7.9 MG/DL (ref 8.6–10.6)
CALCIUM SERPL-MCNC: 8.1 MG/DL (ref 8.6–10.6)
CALCIUM SERPL-MCNC: 8.4 MG/DL (ref 8.6–10.6)
CARDIAC TROPONIN I PNL SERPL HS: 63 NG/L (ref 0–53)
CHLORIDE SERPL-SCNC: 89 MMOL/L (ref 98–107)
CHLORIDE SERPL-SCNC: 91 MMOL/L (ref 98–107)
CHLORIDE SERPL-SCNC: 92 MMOL/L (ref 98–107)
CO2 SERPL-SCNC: 21 MMOL/L (ref 21–32)
CO2 SERPL-SCNC: 22 MMOL/L (ref 21–32)
CO2 SERPL-SCNC: 23 MMOL/L (ref 21–32)
CREAT SERPL-MCNC: 6.09 MG/DL (ref 0.5–1.3)
CREAT SERPL-MCNC: 6.13 MG/DL (ref 0.5–1.3)
CREAT SERPL-MCNC: 6.99 MG/DL (ref 0.5–1.3)
CREAT SERPL-MCNC: 6.99 MG/DL (ref 0.5–1.3)
EGFRCR SERPLBLD CKD-EPI 2021: 11 ML/MIN/1.73M*2
EGFRCR SERPLBLD CKD-EPI 2021: 11 ML/MIN/1.73M*2
EGFRCR SERPLBLD CKD-EPI 2021: 9 ML/MIN/1.73M*2
EGFRCR SERPLBLD CKD-EPI 2021: 9 ML/MIN/1.73M*2
EJECTION FRACTION APICAL 4 CHAMBER: 40.2
EJECTION FRACTION: 35 %
EOSINOPHIL # BLD AUTO: 0.03 X10*3/UL (ref 0–0.7)
EOSINOPHIL # BLD AUTO: 0.04 X10*3/UL (ref 0–0.7)
EOSINOPHIL NFR BLD AUTO: 0.2 %
EOSINOPHIL NFR BLD AUTO: 0.2 %
ERYTHROCYTE [DISTWIDTH] IN BLOOD BY AUTOMATED COUNT: 17.2 % (ref 11.5–14.5)
ERYTHROCYTE [DISTWIDTH] IN BLOOD BY AUTOMATED COUNT: 17.3 % (ref 11.5–14.5)
FIBRINOGEN PPP-MCNC: 201 MG/DL (ref 200–400)
GLUCOSE BLD MANUAL STRIP-MCNC: 122 MG/DL (ref 74–99)
GLUCOSE BLD MANUAL STRIP-MCNC: 211 MG/DL (ref 74–99)
GLUCOSE BLD MANUAL STRIP-MCNC: 269 MG/DL (ref 74–99)
GLUCOSE BLD MANUAL STRIP-MCNC: 300 MG/DL (ref 74–99)
GLUCOSE BLD MANUAL STRIP-MCNC: 317 MG/DL (ref 74–99)
GLUCOSE BLD MANUAL STRIP-MCNC: 81 MG/DL (ref 74–99)
GLUCOSE SERPL-MCNC: 248 MG/DL (ref 74–99)
GLUCOSE SERPL-MCNC: 294 MG/DL (ref 74–99)
GLUCOSE SERPL-MCNC: 300 MG/DL (ref 74–99)
HCT VFR BLD AUTO: 20.3 % (ref 41–52)
HCT VFR BLD AUTO: 20.6 % (ref 41–52)
HGB BLD-MCNC: 7.4 G/DL (ref 13.5–17.5)
HGB BLD-MCNC: 7.6 G/DL (ref 13.5–17.5)
HIV 1+2 AB+HIV1 P24 AG SERPL QL IA: NONREACTIVE
IMM GRANULOCYTES # BLD AUTO: 0.2 X10*3/UL (ref 0–0.7)
IMM GRANULOCYTES # BLD AUTO: 0.27 X10*3/UL (ref 0–0.7)
IMM GRANULOCYTES NFR BLD AUTO: 1.2 % (ref 0–0.9)
IMM GRANULOCYTES NFR BLD AUTO: 1.6 % (ref 0–0.9)
INR PPP: 1.4 (ref 0.9–1.1)
LDH SERPL L TO P-CCNC: 169 U/L (ref 84–246)
LEFT VENTRICLE INTERNAL DIMENSION DIASTOLE: 3.95 CM (ref 3.5–6)
LYMPHOCYTES # BLD AUTO: 0.88 X10*3/UL (ref 1.2–4.8)
LYMPHOCYTES # BLD AUTO: 1.02 X10*3/UL (ref 1.2–4.8)
LYMPHOCYTES NFR BLD AUTO: 5.4 %
LYMPHOCYTES NFR BLD AUTO: 6.1 %
MAGNESIUM SERPL-MCNC: 1.53 MG/DL (ref 1.6–2.4)
MAGNESIUM SERPL-MCNC: 2.68 MG/DL (ref 1.6–2.4)
MCH RBC QN AUTO: 28.6 PG (ref 26–34)
MCH RBC QN AUTO: 28.7 PG (ref 26–34)
MCHC RBC AUTO-ENTMCNC: 35.9 G/DL (ref 32–36)
MCHC RBC AUTO-ENTMCNC: 37.4 G/DL (ref 32–36)
MCV RBC AUTO: 77 FL (ref 80–100)
MCV RBC AUTO: 80 FL (ref 80–100)
MONOCYTES # BLD AUTO: 1.13 X10*3/UL (ref 0.1–1)
MONOCYTES # BLD AUTO: 1.15 X10*3/UL (ref 0.1–1)
MONOCYTES NFR BLD AUTO: 6.7 %
MONOCYTES NFR BLD AUTO: 7 %
NEUTROPHILS # BLD AUTO: 14.04 X10*3/UL (ref 1.2–7.7)
NEUTROPHILS # BLD AUTO: 14.31 X10*3/UL (ref 1.2–7.7)
NEUTROPHILS NFR BLD AUTO: 85.2 %
NEUTROPHILS NFR BLD AUTO: 86 %
NRBC BLD-RTO: 0 /100 WBCS (ref 0–0)
NRBC BLD-RTO: 0 /100 WBCS (ref 0–0)
PHOSPHATE SERPL-MCNC: 3 MG/DL (ref 2.5–4.9)
PHOSPHATE SERPL-MCNC: 3 MG/DL (ref 2.5–4.9)
PHOSPHATE SERPL-MCNC: 3.5 MG/DL (ref 2.5–4.9)
PLATELET # BLD AUTO: 50 X10*3/UL (ref 150–450)
PLATELET # BLD AUTO: 59 X10*3/UL (ref 150–450)
POTASSIUM SERPL-SCNC: 3.1 MMOL/L (ref 3.5–5.3)
POTASSIUM SERPL-SCNC: 3.3 MMOL/L (ref 3.5–5.3)
POTASSIUM SERPL-SCNC: 3.7 MMOL/L (ref 3.5–5.3)
PROT SERPL-MCNC: 5 G/DL (ref 6.4–8.2)
PROT SERPL-MCNC: 5.1 G/DL (ref 6.4–8.2)
PROTHROMBIN TIME: 15.6 SECONDS (ref 9.8–12.8)
RBC # BLD AUTO: 2.59 X10*6/UL (ref 4.5–5.9)
RBC # BLD AUTO: 2.65 X10*6/UL (ref 4.5–5.9)
RIGHT VENTRICLE FREE WALL PEAK S': 11.7 CM/S
RIGHT VENTRICLE PEAK SYSTOLIC PRESSURE: 37.1 MMHG
SODIUM SERPL-SCNC: 126 MMOL/L (ref 136–145)
SODIUM SERPL-SCNC: 126 MMOL/L (ref 136–145)
SODIUM SERPL-SCNC: 127 MMOL/L (ref 136–145)
TRICUSPID ANNULAR PLANE SYSTOLIC EXCURSION: 1.9 CM
WBC # BLD AUTO: 16.3 X10*3/UL (ref 4.4–11.3)
WBC # BLD AUTO: 16.8 X10*3/UL (ref 4.4–11.3)

## 2024-07-23 PROCEDURE — 83735 ASSAY OF MAGNESIUM: CPT

## 2024-07-23 PROCEDURE — 84520 ASSAY OF UREA NITROGEN: CPT | Performed by: INTERNAL MEDICINE

## 2024-07-23 PROCEDURE — 85025 COMPLETE CBC W/AUTO DIFF WBC: CPT

## 2024-07-23 PROCEDURE — 99222 1ST HOSP IP/OBS MODERATE 55: CPT | Performed by: INTERNAL MEDICINE

## 2024-07-23 PROCEDURE — 84100 ASSAY OF PHOSPHORUS: CPT

## 2024-07-23 PROCEDURE — 2020000001 HC ICU ROOM DAILY

## 2024-07-23 PROCEDURE — 82330 ASSAY OF CALCIUM: CPT | Performed by: INTERNAL MEDICINE

## 2024-07-23 PROCEDURE — 82947 ASSAY GLUCOSE BLOOD QUANT: CPT

## 2024-07-23 PROCEDURE — 80069 RENAL FUNCTION PANEL: CPT | Mod: CCI

## 2024-07-23 PROCEDURE — 99291 CRITICAL CARE FIRST HOUR: CPT

## 2024-07-23 PROCEDURE — 37799 UNLISTED PX VASCULAR SURGERY: CPT | Performed by: INTERNAL MEDICINE

## 2024-07-23 PROCEDURE — 2500000004 HC RX 250 GENERAL PHARMACY W/ HCPCS (ALT 636 FOR OP/ED)

## 2024-07-23 PROCEDURE — 82565 ASSAY OF CREATININE: CPT | Performed by: INTERNAL MEDICINE

## 2024-07-23 PROCEDURE — 86022 PLATELET ANTIBODIES: CPT | Mod: WESLAB

## 2024-07-23 PROCEDURE — 99223 1ST HOSP IP/OBS HIGH 75: CPT

## 2024-07-23 PROCEDURE — 80053 COMPREHEN METABOLIC PANEL: CPT

## 2024-07-23 PROCEDURE — 2500000001 HC RX 250 WO HCPCS SELF ADMINISTERED DRUGS (ALT 637 FOR MEDICARE OP): Performed by: INTERNAL MEDICINE

## 2024-07-23 PROCEDURE — 90937 HEMODIALYSIS REPEATED EVAL: CPT

## 2024-07-23 PROCEDURE — 2500000002 HC RX 250 W HCPCS SELF ADMINISTERED DRUGS (ALT 637 FOR MEDICARE OP, ALT 636 FOR OP/ED)

## 2024-07-23 PROCEDURE — 93010 ELECTROCARDIOGRAM REPORT: CPT | Performed by: INTERNAL MEDICINE

## 2024-07-23 PROCEDURE — 93325 DOPPLER ECHO COLOR FLOW MAPG: CPT

## 2024-07-23 PROCEDURE — 99221 1ST HOSP IP/OBS SF/LOW 40: CPT | Performed by: INTERNAL MEDICINE

## 2024-07-23 PROCEDURE — 93325 DOPPLER ECHO COLOR FLOW MAPG: CPT | Performed by: INTERNAL MEDICINE

## 2024-07-23 PROCEDURE — 87522 HEPATITIS C REVRS TRNSCRPJ: CPT

## 2024-07-23 PROCEDURE — 99291 CRITICAL CARE FIRST HOUR: CPT | Performed by: INTERNAL MEDICINE

## 2024-07-23 PROCEDURE — 36415 COLL VENOUS BLD VENIPUNCTURE: CPT

## 2024-07-23 PROCEDURE — 83010 ASSAY OF HAPTOGLOBIN QUANT: CPT

## 2024-07-23 PROCEDURE — 2500000004 HC RX 250 GENERAL PHARMACY W/ HCPCS (ALT 636 FOR OP/ED): Performed by: INTERNAL MEDICINE

## 2024-07-23 PROCEDURE — 93321 DOPPLER ECHO F-UP/LMTD STD: CPT | Performed by: INTERNAL MEDICINE

## 2024-07-23 PROCEDURE — 97166 OT EVAL MOD COMPLEX 45 MIN: CPT | Mod: GO

## 2024-07-23 PROCEDURE — 2500000005 HC RX 250 GENERAL PHARMACY W/O HCPCS

## 2024-07-23 PROCEDURE — 97162 PT EVAL MOD COMPLEX 30 MIN: CPT | Mod: GP

## 2024-07-23 PROCEDURE — 84520 ASSAY OF UREA NITROGEN: CPT

## 2024-07-23 PROCEDURE — 2500000001 HC RX 250 WO HCPCS SELF ADMINISTERED DRUGS (ALT 637 FOR MEDICARE OP)

## 2024-07-23 PROCEDURE — 93308 TTE F-UP OR LMTD: CPT | Performed by: INTERNAL MEDICINE

## 2024-07-23 PROCEDURE — 85384 FIBRINOGEN ACTIVITY: CPT

## 2024-07-23 PROCEDURE — 85060 BLOOD SMEAR INTERPRETATION: CPT | Performed by: PATHOLOGY

## 2024-07-23 PROCEDURE — 2500000004 HC RX 250 GENERAL PHARMACY W/ HCPCS (ALT 636 FOR OP/ED): Mod: JZ

## 2024-07-23 RX ORDER — ACETAMINOPHEN 160 MG/5ML
650 SOLUTION ORAL EVERY 4 HOURS PRN
Status: DISCONTINUED | OUTPATIENT
Start: 2024-07-23 | End: 2024-08-04

## 2024-07-23 RX ORDER — ACETAMINOPHEN 650 MG/1
650 SUPPOSITORY RECTAL EVERY 4 HOURS PRN
Status: DISCONTINUED | OUTPATIENT
Start: 2024-07-23 | End: 2024-08-04

## 2024-07-23 RX ORDER — HEPARIN SODIUM 10000 [USP'U]/100ML
0-4000 INJECTION, SOLUTION INTRAVENOUS CONTINUOUS
Status: DISCONTINUED | OUTPATIENT
Start: 2024-07-23 | End: 2024-07-23

## 2024-07-23 RX ORDER — MAGNESIUM SULFATE HEPTAHYDRATE 40 MG/ML
2 INJECTION, SOLUTION INTRAVENOUS ONCE
Status: COMPLETED | OUTPATIENT
Start: 2024-07-23 | End: 2024-07-23

## 2024-07-23 RX ORDER — ACETAMINOPHEN 325 MG/1
650 TABLET ORAL EVERY 4 HOURS PRN
Status: DISCONTINUED | OUTPATIENT
Start: 2024-07-23 | End: 2024-08-22 | Stop reason: HOSPADM

## 2024-07-23 RX ORDER — VANCOMYCIN HYDROCHLORIDE 750 MG/150ML
750 INJECTION, SOLUTION INTRAVENOUS EVERY 12 HOURS
Status: DISCONTINUED | OUTPATIENT
Start: 2024-07-23 | End: 2024-07-24

## 2024-07-23 RX ORDER — LIDOCAINE 40 MG/G
CREAM TOPICAL 4 TIMES DAILY PRN
Status: DISCONTINUED | OUTPATIENT
Start: 2024-07-23 | End: 2024-08-09

## 2024-07-23 RX ORDER — POTASSIUM CHLORIDE 14.9 MG/ML
20 INJECTION INTRAVENOUS ONCE
Status: DISCONTINUED | OUTPATIENT
Start: 2024-07-23 | End: 2024-07-25 | Stop reason: WASHOUT

## 2024-07-23 SDOH — ECONOMIC STABILITY: INCOME INSECURITY: IN THE LAST 12 MONTHS, WAS THERE A TIME WHEN YOU WERE NOT ABLE TO PAY THE MORTGAGE OR RENT ON TIME?: NO

## 2024-07-23 SDOH — SOCIAL STABILITY: SOCIAL INSECURITY: ARE THERE ANY APPARENT SIGNS OF INJURIES/BEHAVIORS THAT COULD BE RELATED TO ABUSE/NEGLECT?: NO

## 2024-07-23 SDOH — ECONOMIC STABILITY: INCOME INSECURITY: HOW HARD IS IT FOR YOU TO PAY FOR THE VERY BASICS LIKE FOOD, HOUSING, MEDICAL CARE, AND HEATING?: NOT VERY HARD

## 2024-07-23 SDOH — HEALTH STABILITY: PHYSICAL HEALTH: ON AVERAGE, HOW MANY DAYS PER WEEK DO YOU ENGAGE IN MODERATE TO STRENUOUS EXERCISE (LIKE A BRISK WALK)?: 0 DAYS

## 2024-07-23 SDOH — ECONOMIC STABILITY: FOOD INSECURITY: WITHIN THE PAST 12 MONTHS, YOU WORRIED THAT YOUR FOOD WOULD RUN OUT BEFORE YOU GOT MONEY TO BUY MORE.: NEVER TRUE

## 2024-07-23 SDOH — ECONOMIC STABILITY: INCOME INSECURITY: IN THE PAST 12 MONTHS, HAS THE ELECTRIC, GAS, OIL, OR WATER COMPANY THREATENED TO SHUT OFF SERVICE IN YOUR HOME?: NO

## 2024-07-23 SDOH — SOCIAL STABILITY: SOCIAL INSECURITY: ARE YOU OR HAVE YOU BEEN THREATENED OR ABUSED PHYSICALLY, EMOTIONALLY, OR SEXUALLY BY ANYONE?: NO

## 2024-07-23 SDOH — SOCIAL STABILITY: SOCIAL INSECURITY: HAVE YOU HAD THOUGHTS OF HARMING ANYONE ELSE?: NO

## 2024-07-23 SDOH — ECONOMIC STABILITY: HOUSING INSECURITY: IN THE PAST 12 MONTHS, HOW MANY TIMES HAVE YOU MOVED WHERE YOU WERE LIVING?: 0

## 2024-07-23 SDOH — SOCIAL STABILITY: SOCIAL INSECURITY: HAS ANYONE EVER THREATENED TO HURT YOUR FAMILY OR YOUR PETS?: NO

## 2024-07-23 SDOH — ECONOMIC STABILITY: HOUSING INSECURITY: AT ANY TIME IN THE PAST 12 MONTHS, WERE YOU HOMELESS OR LIVING IN A SHELTER (INCLUDING NOW)?: NO

## 2024-07-23 SDOH — ECONOMIC STABILITY: INCOME INSECURITY: HOW HARD IS IT FOR YOU TO PAY FOR THE VERY BASICS LIKE FOOD, HOUSING, MEDICAL CARE, AND HEATING?: NOT HARD AT ALL

## 2024-07-23 SDOH — ECONOMIC STABILITY: FOOD INSECURITY: WITHIN THE PAST 12 MONTHS, THE FOOD YOU BOUGHT JUST DIDN'T LAST AND YOU DIDN'T HAVE MONEY TO GET MORE.: NEVER TRUE

## 2024-07-23 SDOH — ECONOMIC STABILITY: HOUSING INSECURITY: IN THE PAST 12 MONTHS, HOW MANY TIMES HAVE YOU MOVED WHERE YOU WERE LIVING?: 1

## 2024-07-23 SDOH — SOCIAL STABILITY: SOCIAL INSECURITY: ABUSE: ADULT

## 2024-07-23 SDOH — SOCIAL STABILITY: SOCIAL INSECURITY: DO YOU FEEL UNSAFE GOING BACK TO THE PLACE WHERE YOU ARE LIVING?: NO

## 2024-07-23 SDOH — SOCIAL STABILITY: SOCIAL INSECURITY: DO YOU FEEL ANYONE HAS EXPLOITED OR TAKEN ADVANTAGE OF YOU FINANCIALLY OR OF YOUR PERSONAL PROPERTY?: NO

## 2024-07-23 SDOH — SOCIAL STABILITY: SOCIAL INSECURITY: HAVE YOU HAD ANY THOUGHTS OF HARMING ANYONE ELSE?: NO

## 2024-07-23 SDOH — SOCIAL STABILITY: SOCIAL INSECURITY: DOES ANYONE TRY TO KEEP YOU FROM HAVING/CONTACTING OTHER FRIENDS OR DOING THINGS OUTSIDE YOUR HOME?: NO

## 2024-07-23 SDOH — SOCIAL STABILITY: SOCIAL INSECURITY: WERE YOU ABLE TO COMPLETE ALL THE BEHAVIORAL HEALTH SCREENINGS?: YES

## 2024-07-23 SDOH — HEALTH STABILITY: PHYSICAL HEALTH: ON AVERAGE, HOW MANY MINUTES DO YOU ENGAGE IN EXERCISE AT THIS LEVEL?: 0 MIN

## 2024-07-23 ASSESSMENT — PAIN SCALES - GENERAL
PAINLEVEL_OUTOF10: 0 - NO PAIN
PAINLEVEL_OUTOF10: 0 - NO PAIN
PAINLEVEL_OUTOF10: 4
PAINLEVEL_OUTOF10: 0 - NO PAIN
PAINLEVEL_OUTOF10: 0 - NO PAIN
PAINLEVEL_OUTOF10: 5 - MODERATE PAIN
PAINLEVEL_OUTOF10: 10 - WORST POSSIBLE PAIN
PAINLEVEL_OUTOF10: 7
PAINLEVEL_OUTOF10: 8
PAINLEVEL_OUTOF10: 3

## 2024-07-23 ASSESSMENT — COGNITIVE AND FUNCTIONAL STATUS - GENERAL
CLIMB 3 TO 5 STEPS WITH RAILING: TOTAL
WALKING IN HOSPITAL ROOM: TOTAL
STANDING UP FROM CHAIR USING ARMS: TOTAL
TURNING FROM BACK TO SIDE WHILE IN FLAT BAD: A LOT
STANDING UP FROM CHAIR USING ARMS: TOTAL
EATING MEALS: A LITTLE
MOBILITY SCORE: 8
STANDING UP FROM CHAIR USING ARMS: TOTAL
PATIENT BASELINE BEDBOUND: YES
DAILY ACTIVITIY SCORE: 11
WALKING IN HOSPITAL ROOM: TOTAL
MOVING TO AND FROM BED TO CHAIR: TOTAL
EATING MEALS: A LITTLE
HELP NEEDED FOR BATHING: A LOT
DRESSING REGULAR LOWER BODY CLOTHING: A LOT
PERSONAL GROOMING: A LOT
TURNING FROM BACK TO SIDE WHILE IN FLAT BAD: TOTAL
MOBILITY SCORE: 8
EATING MEALS: A LOT
CLIMB 3 TO 5 STEPS WITH RAILING: TOTAL
DRESSING REGULAR UPPER BODY CLOTHING: A LOT
TOILETING: TOTAL
DRESSING REGULAR LOWER BODY CLOTHING: A LOT
PERSONAL GROOMING: A LOT
DRESSING REGULAR UPPER BODY CLOTHING: A LOT
HELP NEEDED FOR BATHING: A LOT
MOVING FROM LYING ON BACK TO SITTING ON SIDE OF FLAT BED WITH BEDRAILS: A LOT
DAILY ACTIVITIY SCORE: 12
DRESSING REGULAR UPPER BODY CLOTHING: A LOT
CLIMB 3 TO 5 STEPS WITH RAILING: TOTAL
TOILETING: A LOT
HELP NEEDED FOR BATHING: TOTAL
MOBILITY SCORE: 7
TURNING FROM BACK TO SIDE WHILE IN FLAT BAD: A LOT
MOVING TO AND FROM BED TO CHAIR: TOTAL
PERSONAL GROOMING: A LITTLE
DAILY ACTIVITIY SCORE: 13
MOVING FROM LYING ON BACK TO SITTING ON SIDE OF FLAT BED WITH BEDRAILS: A LOT
DRESSING REGULAR LOWER BODY CLOTHING: TOTAL
MOVING FROM LYING ON BACK TO SITTING ON SIDE OF FLAT BED WITH BEDRAILS: A LOT
MOVING TO AND FROM BED TO CHAIR: TOTAL
TOILETING: A LOT
WALKING IN HOSPITAL ROOM: TOTAL

## 2024-07-23 ASSESSMENT — ACTIVITIES OF DAILY LIVING (ADL)
HEARING - RIGHT EAR: FUNCTIONAL
DRESSING YOURSELF: NEEDS ASSISTANCE
BATHING_ASSISTANCE: MODERATE
BATHING: NEEDS ASSISTANCE
FEEDING YOURSELF: NEEDS ASSISTANCE
PATIENT'S MEMORY ADEQUATE TO SAFELY COMPLETE DAILY ACTIVITIES?: YES
ADL_ASSISTANCE: NEEDS ASSISTANCE
ADL_ASSISTANCE: NEEDS ASSISTANCE
HEARING - LEFT EAR: FUNCTIONAL
GROOMING: NEEDS ASSISTANCE
ASSISTIVE_DEVICE: WHEELCHAIR
JUDGMENT_ADEQUATE_SAFELY_COMPLETE_DAILY_ACTIVITIES: YES
ADEQUATE_TO_COMPLETE_ADL: YES
TOILETING: NEEDS ASSISTANCE
WALKS IN HOME: NEEDS ASSISTANCE

## 2024-07-23 ASSESSMENT — PATIENT HEALTH QUESTIONNAIRE - PHQ9
1. LITTLE INTEREST OR PLEASURE IN DOING THINGS: SEVERAL DAYS
SUM OF ALL RESPONSES TO PHQ9 QUESTIONS 1 & 2: 2
2. FEELING DOWN, DEPRESSED OR HOPELESS: SEVERAL DAYS

## 2024-07-23 ASSESSMENT — LIFESTYLE VARIABLES
SUBSTANCE_ABUSE_PAST_12_MONTHS: NO
HOW MANY STANDARD DRINKS CONTAINING ALCOHOL DO YOU HAVE ON A TYPICAL DAY: PATIENT DOES NOT DRINK
AUDIT-C TOTAL SCORE: 0
SKIP TO QUESTIONS 9-10: 1
HOW OFTEN DO YOU HAVE A DRINK CONTAINING ALCOHOL: NEVER
HOW OFTEN DO YOU HAVE 6 OR MORE DRINKS ON ONE OCCASION: NEVER
PRESCIPTION_ABUSE_PAST_12_MONTHS: NO
AUDIT-C TOTAL SCORE: 0

## 2024-07-23 ASSESSMENT — ENCOUNTER SYMPTOMS
PALPITATIONS: 0
CHILLS: 0
FEVER: 0
DYSURIA: 0
ABDOMINAL PAIN: 0
ABDOMINAL DISTENTION: 0
DIARRHEA: 0
SHORTNESS OF BREATH: 0

## 2024-07-23 ASSESSMENT — PAIN - FUNCTIONAL ASSESSMENT
PAIN_FUNCTIONAL_ASSESSMENT: 0-10

## 2024-07-23 ASSESSMENT — PAIN DESCRIPTION - LOCATION: LOCATION: WRIST

## 2024-07-23 ASSESSMENT — PAIN DESCRIPTION - ORIENTATION: ORIENTATION: LEFT

## 2024-07-23 NOTE — CONSULTS
Inpatient consult to Cardiology  Consult performed by: Doris Ruiz MD  Consult ordered by: Dayton Young MD      Xiomara Hemphill is a 39 y.o. male on day 1 of admission presenting with Septic shock (Multi).      Subjective   40 yo male with PMHx of ESRD (MWF via femoral line), HFrEF (LVEF 35-40% on 05/24 POLI), Anemia, DM2, and left AMA from [facility] last month, RUE amputation, HTN, PUD, GERD that presents from dialysis on 7/23 for hypotension and was found to be in septic shock with multiplet line-realted DVTs and infections. 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 On interview patient states he has no CP. States he is trying to not smoke. No other symptoms on ROS.       Objective     Last Recorded Vitals  BP 85/53   Pulse 99   Temp 35.4 °C (95.7 °F) (Temporal)   Resp 17   Wt 73.6 kg (162 lb 4.1 oz)   SpO2 100%   Intake/Output last 3 Shifts:    Intake/Output Summary (Last 24 hours) at 7/23/2024 1407  Last data filed at 7/23/2024 1042  Gross per 24 hour   Intake 770.35 ml   Output --   Net 770.35 ml       Admission Weight  Weight: 68.5 kg (151 lb) (07/22/24 1031)    Daily Weight  07/23/24 : 73.6 kg (162 lb 4.1 oz)    Image Results  Transthoracic Echo (TTE) [facility], [address] [place] [address]                 Tel [phone] and Fax [phone]    TRANSTHORACIC ECHOCARDIOGRAM REPORT       Patient Name:      XIOMARA HEMPHILL         Reading Physician:    [id number] Penny Shaw MD  Study Date:        7/23/2024            Ordering Provider:    [id number] DENISHA SONG  MRN/PID:           [id number]             Fellow:  Accession#:        [id number]         Nurse:  Date of Birth/Age: 1985 / 39 years Sonographer:          Reji Bhakta                                                                 [facility]  Gender:            M                    Additional Staff:  Height:            175.26 cm            Admit Date:           7/22/2024  Weight:            73.48 kg             Admission Status:     Inpatient -                                                                Routine  BSA / BMI:         1.89 m2 / 23.92      Encounter#:           [id number]                     kg/m2  Blood Pressure:    94/44 mmHg           [address]    Study Type:    TRANSTHORACIC ECHO (TTE) LIMITED  Diagnosis/ICD: Sepsis, unspecified organism-A41.9  Indication:    concern for endocarditis  CPT Code:      Echo Limited-93693; Doppler Limited-31041; Color Doppler-34575    Patient History:  Pertinent History: HTN, HLD, septic shock, SIRS, tachycardia, PVVD, ESRD, NICM,                     HFrEF.    Study Detail: The following Echo studies were performed: M-Mode, 2D, Doppler and                color flow. Technically challenging study due to prominent lung                artifact, body habitus, patient lying in supine position and poor                acoustic windows. Definity used as a contrast agent for                endocardial border definition. Total contrast used for this                procedure was 4.0 mL via IV push.       Critical Event  Critical Event: Test was completed as per department protocol.  Critical Finding: Possible Mass and or Endocarditis in right atrium/tricuspid valve.  Time Test was Completed: 9:49:00 AM  Notified: Dr. Shaw.  Attending notification time: 9:55:00 AM     PHYSICIAN INTERPRETATION:  Left Ventricle: Left ventricular ejection fraction is moderately decreased, calculated by Briseno's biplane at 35%. There is global hypokinesis of the left ventricle with minor regional variations. The left ventricular cavity size is normal. Left ventricular diastolic filling was not assessed. There is no definite left ventricular thrombus visualized.  Left Atrium:  The left atrium was not assessed.  Right Ventricle: The right ventricle is normal in size. There is reduced right ventricular systolic function.  Right Atrium: The right atrium was not well visualized. There is a device visualized in the right atrium. Several small mobile echodensities noted within the RA likely attached to dialysis catheter and thickened TV with mobile echo densities associated with the TV concerning for possible vegetations with severe TR. Not well seen. Recommend POLI to further assess if clinically indicated.  Aortic Valve: The aortic valve is trileaflet. There is mild to moderate aortic valve cusp calcification. There is no evidence of aortic valve regurgitation.  Mitral Valve: The mitral valve is mildly thickened. There is mild thickening and calcification of the anterior mitral valve leaflet. There is moderate mitral annular calcification. There is trace mitral valve regurgitation.  Tricuspid Valve: The tricuspid valve is abnormal. There is severe tricuspid regurgitation. The Doppler estimated RVSP is mildly elevated at 37.1 mmHg. There is reversed systolic hepatic vein flow. TV with likely mobile echodensities associated tih the leaflets though not well seen with severe TR. RVSP may be underestimated due to severity of TR.  Pulmonic Valve: The pulmonic valve is structurally normal. There is physiologic pulmonic valve regurgitation.  Pericardium: There is a trivial pericardial effusion.  Aorta: The aortic root is normal.  Systemic Veins: The inferior vena cava was not well visualized.  In comparison to the previous echocardiogram(s): Compared with the prior exam POLI from 5/14/2024 ( [facility]) the TV was previously normal with only trivial TR. The dialysis catheter seen within the RA is new since the prior POLI and the mobile echodensities and severe TR are new as well. The LV systolic function was already mild to moderately reduced at that time.       CONCLUSIONS:   1. Left ventricular ejection  fraction is moderately decreased, calculated by Briseno's biplane at 35%.   2. There is global hypokinesis of the left ventricle with minor regional variations.   3. No left ventricular thrombus visualized.   4. There is reduced right ventricular systolic function.   5. Several small mobile echodensities noted within the RA likely attached to dialysis catheter and thickened TV with mobile echo densities associated with the TV concerning for possible vegetations with severe TR. Not well seen. Recommend POLI to further assess if clinically indicated.   6. There is moderate mitral annular calcification.   7. The tricuspid valve is abnormal.   8. Mildly elevated RVSP.   9. Severe tricuspid regurgitation visualized.  10. TV with likely mobile echodensities associated tih the leaflets though not well seen with severe TR. RVSP may be underestimated due to severity of TR.  11. There is reversed systolic hepatic vein flow.  12. Primary service notified of findings at the time of reporting.  13. Compared with the prior exam POLI from 5/14/2024 ( [facility]) the TV was previously normal with only trivial TR. The dialysis catheter seen within the RA is new since the prior POLI and the mobile echodensities and severe TR are new as well. The LV systolic function was already mild to moderately reduced at that time.    QUANTITATIVE DATA SUMMARY:  2D MEASUREMENTS:                           Normal Ranges:  IVSd:          0.80 cm   (0.6-1.1cm)  LVPWd:         0.75 cm   (0.6-1.1cm)  LVIDd:         3.95 cm   (3.9-5.9cm)  LVIDs:         3.25 cm  LV Mass Index: 51.5 g/m2  LV % FS        17.7 %    AORTA MEASUREMENTS:                       Normal Ranges:  Ao Sinus, d: 3.20 cm (2.1-3.5cm)    LV SYSTOLIC FUNCTION BY 2D PLANIMETRY (MOD):                       Normal Ranges:  EF-A4C View:    40 % (>=55%)  EF-A2C View:    29 %  EF-Biplane:     35 %  LV EF Reported: 35 %       RIGHT VENTRICLE:  TAPSE: 18.9 mm  RV s'  0.12 m/s    TRICUSPID VALVE/RVSP:                               Normal Ranges:  Peak TR Velocity: 2.92 m/s  RV Syst Pressure: 37.1 mmHg (< 30mmHg)       [id number] Penny Shaw MD  Electronically signed on 7/23/2024 at 10:49:40 AM       ** Final **    Constitutional: Well-developed male in no acute distress.  HEENT: Normocephalic, atraumatic. PERRL. EOMI.  Respiratory: CTA bilaterally. No wheezes, rales, or rhonchi. Normal respiratory effort.  Cardiovascular: RRR. No gallops, or rubs. No JVD. Systolic murmur noted in pulmonic area  Abdominal: Soft, nondistended, nontender to palpation.  Neuro: AOx3. No focal deficit. UE and LE strength 5/5 bilaterally and sensation intact.   MSK: No LE edema bilaterally. BLE symmetrical.  Skin: Warm, dry. No rashes or wounds.   Psych: Appropriate mood and affect.       Assessment/Plan   38 yo male with PMHx of ESRD (MWF via femoral line), HFrEF (LVEF 35-40% on 05/24 POLI), Anemia, DM2, and left AMA from [facility] last month, RUE amputation, HTN, PUD, GERD that presents from dialysis on 7/23 for hypotension and was found to be in septic shock with multiplet line-realted DVTs and infections.    #Septic Shock  #C/f Endocarditis with likely a recurrent line related infection  #Prolonged QT  Patient has had a history of MV endocarditis in 2020, however on TTE this admission it shows RV regurgitation and echodensities associated with the leaflets, however, not well seen with severe TR  - concern for MRSA, awaiting susceptibilities  - Continue abx management per ID  - Recommend POLI to better characterize echo densities on TV  - NPO at Midnight  - Avoid QT prolonging agents  - Maintain K>4 and Mg>2  - agree with ID and CT Surgery consults    #Paroxysmal Afib  - Attempt to re challenge with Eliquis once platelets return to >50  - currently in NSR       This patient has a central line  Reason for the central line remaining today? Dialysis/Hemapheresis      Principal Problem:    Septic shock (Multi)     Note cowritten with   David Mobley,  MD    Patient discussed and seen with Dr. Latif, whom agrees with the plan    Doris Ruiz MD   PGY5 Cardiology Fellow   Pager [phone]

## 2024-07-23 NOTE — PROGRESS NOTES
Occupational Therapy    Evaluation    Patient Name: Edwar Hemphill  MRN: [id number]  Today's Date: 7/23/2024  Room: 24/24  Time Calculation  Start Time: 1048  Stop Time: 1118  Time Calculation (min): 30 min    Assessment  IP OT Assessment  OT Assessment: Edwar is a 40yo male presenting with deficits in ADLs, trasnsfers, functional activity tolerance and bed mobility. Pt would benefit from skilled OT intervention to return to PLOF safely  Prognosis: Good  Barriers to Discharge: None  Evaluation/Treatment Tolerance: Patient tolerated treatment well  Medical Staff Made Aware: Yes  End of Session Communication: Bedside nurse  End of Session Patient Position: Bed, 3 rail up, Alarm off, not on at start of session  Plan:  Inpatient Plan  Treatment Interventions: ADL retraining, Functional transfer training, Endurance training, Patient/family training, Compensatory technique education, Equipment evaluation/education  OT Frequency: 2 times per week  OT Discharge Recommendations: Moderate intensity level of continued care (return to facility)  OT Recommended Transfer Status: Dependent  OT - OK to Discharge: Yes  OT Assessment  OT Assessment Results: Decreased ADL status, Decreased endurance, Decreased functional mobility, Decreased gross motor control  Prognosis: Good  Barriers to Discharge: None  Evaluation/Treatment Tolerance: Patient tolerated treatment well  Medical Staff Made Aware: Yes  Strengths: Ability to acquire knowledge, Living arrangement secure, Support of Caregivers  Barriers to Participation: Comorbidities, Premorbid level of function    Subjective   Current Problem:  1. Septic shock (Multi)  Transthoracic Echo (TTE) Limited    Transthoracic Echo (TTE) Limited      2. Hypovolemia        3. Acute infective endocarditis, due to unspecified organism ([facility])  Transesophageal Echo (POLI)    Transesophageal Echo (POLI)    CANCELED: Transesophageal Echo (POLI)    CANCELED: Transesophageal Echo (POLI)         General:  Reason for Referral: presented to the ED with  tachycardia and hypotension. pt transferred to the MICU d/t septic shock and requiring pressors  Past Medical History Relevant to Rehab: CKD on HD, DM2, ESRD, HTN, GERD, HLD, hypothyroidism, PVD, HFrEF, anemia, L AKA, RUE amputation above the elbow, R heel unstageable ulcer  Co-Treatment: PT  Co-Treatment Reason: x2 assist to maximize mobility function. pt irritable. Unsuccessful mobilization by RN.  Prior to Session Communication: Bedside nurse  Patient Position Received: Bed, 3 rail up, Alarm off, not on at start of session  Family/Caregiver Present: No  General Comment: Pt requires encouragement to participate in therapy. Pt reports discomfort with changes in position   Precautions:  Hearing/Visual Limitations: hearing WFL  Medical Precautions: Fall precautions  Precautions Comment: Contact precaution, LUE DVT  Vital Signs:  Heart Rate: 94 (96 post)  Resp: 25 (20 post)  SpO2: 100 % (100 post)  BP: 85/60 (97/59 EOB, 91/51 post)  MAP (mmHg): 65 (67 EOB, 65 post)  Pain:  Pain Assessment  Pain Assessment: 0-10  0-10 (Numeric) Pain Score: 0 - No pain  Lines/Tubes/Drains:  Hemodialysis Cath Right Femoral (Active)   Number of days:          Objective   Cognition:  Overall Cognitive Status: Impaired  Orientation Level:  (AOx3)  Following Commands: Follows one step commands with repetition (and increased time)  Insight: Mild  Impulsive: Mildly  Processing Speed: Delayed           Home Living:  Type of Home: Skilled Nursing facility  Home Adaptive Equipment: Wheelchair-power (gennaro lift)   Prior Function:  Level of [place]: Needs assistance with ADLs, Needs assistance with homemaking, Needs assistance with functional transfers  Receives Help From:  (Staff)  ADL Assistance: Needs assistance (pt needs assistance with bathing and dressing, but independent with feeding.)  Homemaking Assistance: Needs assistance  Ambulatory Assistance: Needs assistance  Leisure:  Playing and listening to music and playing video games  Prior Function Comments: denies falls, has been working with therapy. reports he transfers via gennaro and has been working on STS transfers. Pt is questionable historian  IADL History:     ADL:  Eating Assistance: Independent (anticipated)  Grooming Assistance:  (s/u)  Bathing Assistance: Moderate (anticipated)  UE Dressing Assistance: Moderate (anticipated)  LE Dressing Assistance: Maximal (anticipated)  Toileting Assistance with Device: Maximal (anticipated)  ADL Comments: washes face with s/u  Activity Tolerance:  Endurance: Tolerates 10 - 20 min exercise with multiple rests  Early Mobility/Exercise Safety Screen: Proceed with mobilization - No exclusion criteria met  Balance:     Bed Mobility/Transfers: Bed Mobility  Bed Mobility: Yes  Bed Mobility 1  Bed Mobility Comments 1: Placed in dependent chair position x10 minutes prior to progressing EOB. VSS  Bed Mobility 2  Bed Mobility  2: Rolling right, Rolling left  Level of Assistance 2: Maximum assistance (x1)  Bed Mobility Comments 2: HOB flat, step by step cues    Sensation:  Sensation Comment: Denies n/t noted to have decreased light touch sensation on R foot  Strength:  Strength Comments: LUE WFL       Extremities: RUE   RUE :  (Above the elbow amputation, WFL for shoulder ROM), LUE   LUE:  (AROM WFL),  , and LLE   LLE :  (Hx of AKA.)    Outcome Measures: [facility] Daily Activity  Putting on and taking off regular lower body clothing: Total  Bathing (including washing, rinsing, drying): Total  Putting on and taking off regular upper body clothing: A lot  Toileting, which includes using toilet, bedpan or urinal: Total  Taking care of personal grooming such as brushing teeth: A little  Eating Meals: A little  Daily Activity - Total Score: 11    Confusion Assessment Method-ICU (CAM-ICU)  Feature 1: Acute Onset or Fluctuating Course: Negative  Feature 2: Inattention: Negative  Feature 4: Disorganized Thinking:  Negative  Overall CAM-ICU: Negative   ICU Mobility Screen  Early Mobility/Exercise Safety Screen: Proceed with mobilization - No exclusion criteria met  ICU Mobility Scale: Sitting in bed, exercises in bed,          Education Documentation  Body Mechanics, taught by Nadia Terrazas OT at 7/23/2024  4:09 PM.  Learner: Patient  Readiness: Acceptance  Method: Explanation  Response: Needs Reinforcement    Precautions, taught by Nadia Terrazas OT at 7/23/2024  4:09 PM.  Learner: Patient  Readiness: Acceptance  Method: Explanation  Response: Needs Reinforcement    ADL Training, taught by Nadia Terrazas OT at 7/23/2024  4:09 PM.  Learner: Patient  Readiness: Acceptance  Method: Explanation  Response: Needs Reinforcement    Education Comments  No comments found.        Goals:     Encounter Problems       Encounter Problems (Active)       ADLs       Patient with complete upper body dressing with minimal assist  level of assistance donning and doffing all UE clothes while edge of bed  (Progressing)       Start:  07/23/24    Expected End:  08/13/24            Patient with complete lower body dressing with moderate assist level of assistance donning and doffing all LE clothes while supported sitting (Progressing)       Start:  07/23/24    Expected End:  08/13/24            Patient will complete daily grooming tasks  with independent level of assistance and PRN adaptive equipment. (Progressing)       Start:  07/23/24    Expected End:  08/13/24            Patient will complete toileting including hygiene clothing management/hygiene with moderate assist level of assistance and bedside commode. (Progressing)       Start:  07/23/24    Expected End:  08/13/24               TRANSFERS       Patient will perform bed mobility moderate assist level of assistance in order to improve safety and independence with mobility (Progressing)       Start:  07/23/24    Expected End:  08/13/24 07/23/24 at 4:09 PM   Nadia Terrazas  OT   Rehab Office: [phone]

## 2024-07-23 NOTE — PROGRESS NOTES
Medical Intensive Care - Daily Progress Note   Subjective    Edwar Hemphill is a 39 y.o. year old male patient admitted on 7/22/2024 with following ICU needs:     Interval History:  Edwar Hemphill is a 39 y.o. male with PMHx significant for ESRD (MWF  via right femoral line), NICM,  HFrEF (LVEF 35-40% on 5/2024 POLI), anemia, DM2, Left AKA, RUE amputation, HTN, PVD, GERD.  Patient presented to his dialysis session last night. and was found to be hypotensive 70s/40s, and was told to brought to the ED.  Upon presentation in the ED patient was found to have blood pressure of 112/76, afebrile, tachycardic with heart rate of 109, respirate of 16 satting well on room air.  Patient's labs revealed hyponatremia, hypokalemia at 3.1, creatinine of 6.88 (patient is ESRD) lactate on presentation was 3.3.  Patient CBC showed leukocytosis with white count of 20 K, hemoglobin of 7.3 (baseline), platelets of 65 (baseline 180s).  Patient was also evaluated by POCUS and was found to have collapsible IVC, BC 2/4 gram psotive cocci in clusters.  Patient was started on Vanc/ Zosyn due to concern for sepsis ( patient has a history of MRSA sepsis) patient was also given a total of 2.5 L of LR in the ED as well as started on low-dose pressors.   MICU course:  Upon presentation to MICU he was on Levophed 0.05 with MAP 70s, no complaint of fever, n/v, chest pain or SOB.    Meds    Scheduled medications  atorvastatin, 40 mg, oral, Nightly  B complex-vitamin C-folic acid, 1 capsule, oral, Daily  cholecalciferol, 50,000 Units, oral, Once per day on Monday Thursday  epoetin tyson or biosimilar, 10,000 Units, subcutaneous, Once per day on Monday Wednesday Friday  folic acid, 1 mg, oral, Daily  insulin glargine, 4 Units, subcutaneous, Nightly  insulin lispro, 0-10 Units, subcutaneous, TID  lactobacillus acidophilus, 1 tablet, oral, BID  levothyroxine, 125 mcg, oral, Daily before breakfast  melatonin, 6 mg, oral, Nightly  midodrine, 10 mg, oral,  "q8h  pantoprazole, 40 mg, oral, Daily before breakfast  piperacillin-tazobactam, 2.25 g, intravenous, q8h  potassium chloride, 20 mEq, intravenous, Once  sevelamer carbonate, 2,400 mg, oral, TID  thiamine, 200 mg, oral, Daily      Continuous medications  norepinephrine, 0.01-1 mcg/kg/min, Last Rate: 0.08 mcg/kg/min (07/23/24 1008)      PRN medications  PRN medications: dextrose, dextrose, glucagon, glucagon, vancomycin     Objective    Blood pressure 85/53, pulse 99, temperature 35.4 °C (95.7 °F), temperature source Temporal, resp. rate 17, height 1.753 m (5' 9.02\"), weight 73.6 kg (162 lb 4.1 oz), SpO2 100%.     Physical Exam  Constitutional:       Appearance: Normal appearance.   HENT:      Mouth/Throat:      Mouth: Mucous membranes are moist.   Cardiovascular:      Rate and Rhythm: Tachycardia present.      Heart sounds: Normal heart sounds.   Pulmonary:      Effort: Pulmonary effort is normal.      Breath sounds: Normal breath sounds.   Abdominal:      Palpations: Abdomen is soft.   Musculoskeletal:      Comments: Left foot amputated above the knee  Right hand amputated above elbow  Posterior calcaneous wound has yellow discharge   Skin:     General: Skin is dry.   Neurological:      General: No focal deficit present.      Mental Status: He is oriented to person, place, and time.   Psychiatric:         Behavior: Behavior normal.         Thought Content: Thought content normal.         Judgment: Judgment normal.          Intake/Output Summary (Last 24 hours) at 7/23/2024 1257  Last data filed at 7/23/2024 1042  Gross per 24 hour   Intake 770.35 ml   Output --   Net 770.35 ml     Labs:   Results from last 72 hours   Lab Units 07/23/24  0952 07/23/24  0518 07/22/24  2224   SODIUM mmol/L 126* 126* 127*   POTASSIUM mmol/L 3.3* 3.7 3.1*   CHLORIDE mmol/L 89* 92* 91*   CO2 mmol/L 23 21 22   BUN mg/dL 46* 43* 44*   CREATININE mg/dL 6.99* 6.13* 6.09*   GLUCOSE mg/dL 300* 294* 248*   CALCIUM mg/dL 8.4* 7.9* 8.1*   ANION " GAP mmol/L 17 17 17   EGFR mL/min/1.73m*2 9* 11* 11*   PHOSPHORUS mg/dL 3.5 3.0 3.0      Results from last 72 hours   Lab Units 07/23/24  0518 07/22/24  2224 07/22/24  1045   WBC AUTO x10*3/uL 16.3* 16.8* 20.0*   HEMOGLOBIN g/dL 7.6* 7.4* 7.3*   HEMATOCRIT % 20.3* 20.6* 19.9*   PLATELETS AUTO x10*3/uL 50* 59* 65*   NEUTROS PCT AUTO % 86.0 85.2 86.3   LYMPHS PCT AUTO % 5.4 6.1 5.6   MONOS PCT AUTO % 7.0 6.7 6.5   EOS PCT AUTO % 0.2 0.2 0.1        Micro/ID:     Lab Results   Component Value Date    BLOODCULT Staphylococcus aureus (AA) 07/22/2024    BLOODCULT Staphylococcus aureus (AA) 07/22/2024       Summary of key imaging results from the last 24 hours  TTE 7/23/24:  1. Left ventricular ejection fraction is moderately decreased, calculated by Briseno's biplane at 35%.   2. There is global hypokinesis of the left ventricle with minor regional variations.   3. No left ventricular thrombus visualized.   4. There is reduced right ventricular systolic function.   5. Several small mobile echodensities noted within the RA likely attached to dialysis catheter and thickened TV with mobile echo densities associated with the TV concerning for possible vegetations with severe TR. Not well seen. Recommend POLI to further assess if clinically indicated.   6. There is moderate mitral annular calcification.   7. The tricuspid valve is abnormal.   8. Mildly elevated RVSP.   9. Severe tricuspid regurgitation visualized.  10. TV with likely mobile echodensities associated tih the leaflets though not well seen with severe TR. RVSP may be underestimated due to severity of TR.  11. There is reversed systolic hepatic vein flow.  12. Primary service notified of findings at the time of reporting.  13. Compared with the prior exam POLI from 5/14/2024 ( [facility]) the TV was previously normal with only trivial TR. The dialysis catheter seen within the RA is new since the prior POLI and the mobile echodensities and severe TR are new as well. The LV  systolic function was already mild to moderately reduced at that time.    Assessment and Plan     Assessment: Assessment:  Edwar Hemphill is a 39 y.o. male with PMHx significant for ESRD (MWF  via right femoral line), NICM,  HFrEF (LVEF 35-40% on 5/2024 POLI), anemia, DM2, Left AKA, RUE amputation, HTN, PVD, GERD.  Admitted w/ bcx growing GPCs likelt MRSA 2/2 history MRSA bacetremia. Started on vanc/zosyn, fluid resuscitated, ID, cardiology and vascular medicine consulted. On TTE showed TV vegetation and possible source could be femoral catheter, he also had  Acinetobacter baumanni positive on his wound culture.     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   Plan:  -continue on levo   -consult ID and cardiology   -started on vanco/zoycin      #HFrecEF   - last EF 39% (lowest EF 26%)   - Presumed ICM         # hx of paroxysmal Afib  #Multiple line-related DVTs   - CHADSVASC 6 (DM, PAD, TIA, HTN, HF)   - b/l subclavian DVT, L IJ DVT, b/l innominate occlusion, complete occlusion of intrahepatic IVC s/p angioplasty   - priorly on Eliquis however recent GI bleed 6/15 at [facility] , eliquis was held and patient was planned for heparin trial but left ama prior to trial   - not a candidate for IVC filter given femoral TDC   - last DVT US w/ resolution of RLE DVT   -on heparin subcutaneous q8h   - will trial heparin sub q when coagulation panel returned  Plan:  -Consult with vascular medicine        PULMONARY:  NAVI  CXR at bl     RENAL/GENITOURINARY:  #ESRD MWF thru right femoral line   -  iHD TTS since 2016, anuric   - RUE AVG c/b infection s/p RUE amputation above elbow 2021   - multiple TDCs (including iliolumbar)   - femoral TDC recently placed at  (14.5  Bulgarian and 42 cm long)  - lokelma 5 MWF Held iso of hypokalemia  - home sevelmer held     GASTROENTEROLOGY:  NAVI     ENDOCRINOLOGY:  #hypothyroidism  -cw home levo 125     #T1DM  :: home regiment : 8u glargine, SSI  :: Hba1c 7.3  :: home regiment insulin 8u glargine  -ordered 4 unites glargine and SSI #2     #Esophagitis   #GERD  ::EGD on 6/8 clipped two foci of active esophageal bleeding   -cw home PPI        HEMATOLOGY:  #Chronic anemia  #Anemia of chronic disease  - c/w home epo m/w/f     MUSCULOSKELETAL/ SKIN:  NAVI     INFECTIOUS DISEASE:  #Sepsis with line as Likely  source  #endocarditis   - repeat bcx every 48 h   -cx vanc/zosyn  - has previously needed double coverage for 4 weeks with van/dapto to cover infection. But developed lung toxicity 2/2 dapto. And switched to linezolid.  - cosnider POLI   -remove femoral line (source of inf)          ICU Check List      FEN:  Fluids: s/p 3L  Electrolytes: k>4 mg>2 caution as ESRD  Nutrition: renal diet  DVT ppx: sub q heparin  GI ppx: PPI  Bowel care: Miralax prn  Access:  PIV, Femoral line        Social:  Code: Full Code    NOK:   Extended Emergency Contact Information  Primary Emergency Contact: Shanthi Hemphill  Mobile Phone: [phone]  Relation: Parent  Secondary Emergency Contact: Terra Sctot  Mobile Phone: [phone]    Tucker Irwin MD   07/23/24 at 12:57 PM     Disclaimer: Documentation completed with the information available at the time of input. The times in the chart may not be reflective of actual patient care times, interventions, or procedures. Documentation occurs after the physical care of the patient.

## 2024-07-23 NOTE — CONSULTS
Wound Care Consult     Visit Date: 7/23/2024      Patient Name: Edwar Hemphill         MRN: [id number]           YOB: 1985     Reason for Consult: assess Right Heel / Sacrum/ Thigh             Pertinent Labs:   Albumin   Date Value Ref Range Status   07/23/2024 2.2 (L) 3.4 - 5.0 g/dL Final       Wound Assessment:  Wound 11/14/23 Other (comment) Heel Right (Active)   Wound Image   07/23/24 1342   Site Assessment Black;Brown;Eschar 07/23/24 1342   Aracelis-Wound Assessment Dry;Intact 07/23/24 1342   Non-staged Wound Description Full thickness 07/22/24 1137   Shape irregular 07/23/24 1200   Wound Length (cm) 4 cm 07/23/24 1342   Wound Width (cm) 3 cm 07/23/24 1342   Wound Surface Area (cm^2) 12 cm^2 07/23/24 1342   Wound Depth (cm) 0.5 cm 07/22/24 2030   Wound Volume (cm^3) 22.5 cm^3 07/22/24 2030   Wound Healing % -793 07/22/24 2030   State of Healing Eschar 07/23/24 1342   Wound Bed Granulation (%) 0 % 11/14/23 1520   Wound Bed Slough (%) 10 % 11/14/23 1520   Wound Bed Eschar (%) 80 % 11/14/23 1520   Margins Well-defined edges 07/23/24 1342   Drainage Description None 07/23/24 1342   Drainage Amount None 07/23/24 1342   Dressing Other (Comment) 07/23/24 1342   Dressing Changed Changed 07/23/24 1342   Dressing Status Clean 07/23/24 1342       Wound 05/13/24 Other (comment) Leg Right;Upper (Active)   Wound Image   07/23/24 1338   Site Assessment Yellow;Sloughing;Pink;Blanchable erythema 07/23/24 1338   Aracelis-Wound Assessment Moist  07/23/24 1338   Non-staged Wound Description Partial thickness 07/23/24 1338   Wound Length (cm) 4 cm 07/23/24 1338   Wound Width (cm) 5.5 cm 07/23/24 1338   Wound Surface Area (cm^2) 22 cm^2 07/23/24 1338   Wound Depth (cm) 0.2 cm 07/23/24 1338   Wound Volume (cm^3) 4.4 cm^3 07/23/24 1338   Drainage Description Serous 07/23/24 1338   Drainage Amount Scant 07/23/24 1338   Dressing Other (Comment) 07/23/24 1338   Dressing Changed New 07/23/24 1338   Dressing Status Clean 07/23/24 1338        Wound 07/22/24 Leg Left;Proximal;Upper;Anterior (Active)   Wound Image   07/22/24 2020   Shape MASD 07/23/24 1200       Wound 07/22/24 Moisture Associated Skin Damage Coccyx (Active)   Wound Image   07/22/24 2024   Site Assessment Blanchable erythema 07/23/24 1338   Aracelis-Wound Assessment Painful;Edematous 07/23/24 1338   Pressure Injury Stage 2 07/22/24 2024   Wound Length (cm) 3 cm 07/22/24 2024   Wound Width (cm) 1 cm 07/22/24 2024   Wound Surface Area (cm^2) 3 cm^2 07/22/24 2024   Wound Depth (cm) 0 cm 07/22/24 2024   Wound Volume (cm^3) 0 cm^3 07/22/24 2024   Dressing Silicone border dressing 07/23/24 1338   Dressing Changed Changed 07/23/24 1338   Dressing Status Clean 07/23/24 1338       Wound Team Summary Assessment:   Right heel :   Paint with betadine   Elevate heel at all times. Utilize Truview boot to maintain elevation     B/L groin: skin white pink moist   Clean 2x/day  Apply Nystatin powder to groin     Sacrum:   Cleanse area with normal saline   Cover with Mepilex border dressing      Right Thigh and lower buttocks : 2x/day and as needed  Cleanse wound with normal saline   Apply Triad Wound dressing   Turn every 2 hours    Maintain EHOB waffle mattress     Wound Team Plan: Please review recommendations      Beba ELMORE   7/23/2024  7:25 PM

## 2024-07-23 NOTE — CONSULTS
Nutrition Note:   Nutrition Assessment    Reason for Assessment: Admission nursing screening for wounds    Consulted to see patient for wounds-- chart reviewed and events noted.  Here for low BP-- now on levo for pressure support and starting on CVVH.  He has wounds as noted in RN flowsheets.  He had been on a renal diet but is now NPO for POLI.      Met with patient.  He was sleepy at visit.  Reports a good/normal appetite and PO intake PTA.  He reports a dry weight post-HD of 70kgs--> he is currently 73.6kg.  Does not drink anything like Ensure or Boost at home.  No GI issues to report.      Dietary Orders (From admission, onward)       Start     Ordered    07/24/24 0001  NPO Diet; Effective midnight  Diet effective midnight         07/23/24 1316    07/23/24 1317  Adult diet Renal; Potassium Restricted 2 gm (50mEq); 2 - 3 grams Sodium  Diet effective now        Question Answer Comment   Diet type Renal    Potassium restriction: Potassium Restricted 2 gm (50mEq)    Sodium restriction: 2 - 3 grams Sodium        07/23/24 1316                     Time Spent/Follow-up Reminder:   Time Spent (min): 15 minutes  Last Date of Nutrition Visit: 07/23/24  Nutrition Follow-Up Needed?: None  Follow up Comment: brief note: good PO, no supps

## 2024-07-23 NOTE — CONSULTS
Inpatient consult to Vascular Medicine  Consult performed by: Monae Wang MD  Consult ordered by: Dayton Young MD  Reason for consult: Recurrent VTE, anticoagulation recommendation          History Of Present Illness  Edwar Hemphill is a 39 y.o. male presenting with PMHx significant for ESRD (MWF  via right femoral line), NICM,  HFrEF (LVEF 35-40% on 5/2024 POLI), paroxysmal A-fib, [id number] 6 ,anemia, DM2, Left AKA, RUE amputation, HTN, PVD, GERD, chronic smoker.  Patient was sent to the ER yesterday from dialysis as he was found to be tachycardic and hypotensive. Patient was also evaluated by POCUS and was found to have collapsible IVC, BC 2/4 gram positive cocci in clusters.  Patient was started on Vanc/ Zosyn due to concern for sepsis, patient was also given a total of 2.5 L of LR in the ED as well as started on low-dose pressors and was admitted to MICU for further treatment of septic shock, bacteremia and endocarditis.  TTE showed TV vegetation and possible source could be femoral catheter.  Vascular medicine has been consulted for anticoagulation recommendations given history of recurrent VTE's.  Of note patient has a history of recurrent VTE's that appears to be line related.  Of note his most recent common femoral VTE was in April 2024 (b/l subclavian DVT, L IJ DVT, b/l innominate occlusion, complete occlusion of intrahepatic IVC s/p angioplasty ).  He was previously being treated with Eliquis 5 mg twice daily however he was admitted to [facility] with esophageal bleeding on 6/15 and anticoagulation was held during that admission.  He was planned for heparin trial however patient left A.  Reports that since he left the [facility] he was not taking his Eliquis.  On admission he has been started on heparin SQ every 8.  Last DVT ultrasound with resolution of RLE DVT RLE DVT (6/5/2024)  Unfortunately he is not a candidate for IVC filter given femoral TDC.     Past Medical History  He has a past medical  history of Chronic kidney disease, Diabetes mellitus (Multi), ESRD (end stage renal disease) (Multi), Essential (primary) hypertension (05/26/2017), GERD (gastroesophageal reflux disease), Hyperlipidemia, and Hypothyroidism.    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     Social History  He reports that he has been smoking cigarettes. He has a 3.8 pack-year smoking history. He has never used smokeless tobacco. No history on file for alcohol use and drug use.    Family History  Family History   Problem Relation Name Age of Onset    Other (DIABETES DUE TO UNDERLYING CONDITION WITH MICROALBUMINURIA) Father      Other (DIABETES DUE TO UNDERLYING CONDITION WITH MICROALBUMINURIA [Other]) Other AUNT     Other (DIABETES DUE TO UNDERLYING CONDITION WITH MICROALBUMINURIA [Other]) Other GP         Allergies  Cashew nut and Daptomycin    Review of Systems  Review of Systems   Respiratory:  Negative for shortness of breath.    Cardiovascular:  Negative for chest pain, palpitations and leg swelling.   Gastrointestinal:  Negative for abdominal pain.   All other systems reviewed and are negative.        Physical Exam  General appearance: alert and oriented, in no acute distress  Lungs: clear to auscultation bilaterally  Heart: regular rate and rhythm, S1, S2 normal, no murmur  Abdomen: soft, non-tender; bowel sounds normal;  Extremities: Right upper extremity amputation, left lower extremity AKA, chronic skin changes in right lower extremity, tunnel catheter in  right thigh  Skin: Skin color, texture, turgor normal. No rashes or lesions  Neurologic: Grossly normal        Last Recorded Vitals  /70   Pulse 92   Temp 35.4 °C (95.7 °F) (Temporal)   Resp (!) 0   Wt 73.6 kg (162 lb 4.1 oz)   SpO2 99%     Relevant Results  Echocardiogram 7/23/2024:  CONCLUSIONS:   1. Left ventricular ejection fraction is moderately decreased, calculated by Briseno's biplane at 35%.   2. There is global hypokinesis of the left ventricle with minor regional variations.   3. No left ventricular thrombus visualized.   4. There is reduced right ventricular systolic function.   5. Several small mobile echodensities noted within the RA likely attached to dialysis catheter and thickened TV with mobile echo densities associated with the TV concerning for possible vegetations with severe TR. Not well seen. Recommend POLI to further assess if clinically indicated.   6. There is moderate mitral annular calcification.   7. The tricuspid valve is abnormal.   8. Mildly elevated RVSP.   9. Severe tricuspid regurgitation visualized.  10. TV with likely mobile echodensities associated tih the leaflets though not well seen with severe TR. RVSP may be underestimated due to severity of TR.  11. There is reversed systolic hepatic vein flow.  12. Primary service notified of findings at the time of reporting.  13. Compared with the prior exam POLI from 5/14/2024 ( [facility]) the TV was previously normal with only trivial TR. The dialysis catheter seen within the RA is new since the prior POLI and the mobile echodensities and severe TR are new as well. The LV systolic function was already mild to moderately reduced at that time.     QUANTITATIVE DATA SUMMARY:  2D MEASUREMENTS:                           Normal Ranges:  IVSd:          0.80 cm   (0.6-1.1cm)  LVPWd:         0.75 cm   (0.6-1.1cm)  LVIDd:         3.95 cm   (3.9-5.9cm)  LVIDs:         3.25 cm  LV Mass Index: 51.5 g/m2  LV % FS        17.7 %     AORTA  MEASUREMENTS:                       Normal Ranges:  Ao Sinus, d: 3.20 cm (2.1-3.5cm)     LV SYSTOLIC FUNCTION BY 2D PLANIMETRY (MOD):                       Normal Ranges:  EF-A4C View:    40 % (>=55%)  EF-A2C View:    29 %  EF-Biplane:     35 %  LV EF Reported: 35 %        RIGHT VENTRICLE:  TAPSE: 18.9 mm  RV s'  0.12 m/s     TRICUSPID VALVE/RVSP:                              Normal Ranges:  Peak TR Velocity: 2.92 m/s  RV Syst Pressure: 37.1 mmHg (< 30mmHg)        [id number] Penny Shaw MD  Electronically signed on 7/23/2024 at 10:49:40 AM       MEDS:    atorvastatin, 40 mg, oral, Nightly  B complex-vitamin C-folic acid, 1 capsule, oral, Daily  cholecalciferol, 50,000 Units, oral, Once per day on Monday Thursday  epoetin tyson or biosimilar, 10,000 Units, subcutaneous, Once per day on Monday Wednesday Friday  folic acid, 1 mg, oral, Daily  insulin glargine, 4 Units, subcutaneous, Nightly  insulin lispro, 0-10 Units, subcutaneous, TID  lactobacillus acidophilus, 1 tablet, oral, BID  levothyroxine, 125 mcg, oral, Daily before breakfast  melatonin, 6 mg, oral, Nightly  midodrine, 10 mg, oral, q8h  pantoprazole, 40 mg, oral, Daily before breakfast  piperacillin-tazobactam, 2.25 g, intravenous, q8h  potassium chloride, 20 mEq, intravenous, Once  [Held by provider] sevelamer carbonate, 2,400 mg, oral, TID  thiamine, 200 mg, oral, Daily  vancomycin, 750 mg, intravenous, q12h      norepinephrine, 0.01-1 mcg/kg/min, Last Rate: 0.07 mcg/kg/min (07/23/24 1508)  PrismaSol 4/2.5, 25 mL/kg/hr      PRN medications: dextrose, dextrose, glucagon, glucagon, vancomycin    Assessment/Plan     Edwar Hemphill is a 39 y.o. male presenting with PMHx significant for ESRD (MWF  via right femoral line), NICM,  HFrEF (LVEF 35-40% on 5/2024 POLI), paroxysmal A-fib, [id number] 6 ,anemia, DM2, Left AKA, RUE amputation, HTN, PVD, GERD, chronic smoker.  Patient admitted to MICU for septic shock, bacteremia and endocarditis.  TTE showed TV vegetation and  possible source could be femoral catheter.  Vascular medicine has been consulted for anticoagulation recommendations given history of recurrent VTE's.  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  Of note his most recent common femoral VTE was in April 2024 (b/l subclavian DVT, L IJ DVT, b/l innominate occlusion, complete occlusion of intrahepatic IVC s/p angioplasty ).  He was also recently admitted to [facility] with upper GI bleed and anticoagulation was held at that time.  He is currently on heparin subcu every 8 hours.  Given his [id number] score and history of recent VTE in April 2024 he would need an appropriate anticoagulation.  Please continue with heparin subcu for now.  Please discuss with the GI to weigh in on when it would be appropriate to transition him to p.o. anticoagulation.  If he remains low risk for rebleeding from upper GI then we can consider restarting him on Eliquis 5 mg twice daily which was his home dose.    Discussed with  attending, Dr. Hernandez.        Monae Wang MD   Fellow

## 2024-07-23 NOTE — CARE PLAN
Problem: Pain - Adult  Goal: Verbalizes/displays adequate comfort level or baseline comfort level  Outcome: Progressing     Problem: Safety - Adult  Goal: Free from fall injury  Outcome: Progressing     Problem: Chronic Conditions and Co-morbidities  Goal: Patient's chronic conditions and co-morbidity symptoms are monitored and maintained or improved  Outcome: Progressing     Problem: Skin  Goal: Decreased wound size/increased tissue granulation at next dressing change  Outcome: Progressing  Flowsheets (Taken 7/22/2024 2057)  Decreased wound size/increased tissue granulation at next dressing change:   Protective dressings over bony prominences   Promote sleep for wound healing  Goal: Participates in plan/prevention/treatment measures  Outcome: Progressing  Flowsheets (Taken 7/22/2024 2057)  Participates in plan/prevention/treatment measures: Elevate heels  Goal: Prevent/manage excess moisture  Outcome: Progressing  Goal: Prevent/minimize sheer/friction injuries  Outcome: Progressing  Flowsheets (Taken 7/22/2024 2057)  Prevent/minimize sheer/friction injuries:   HOB 30 degrees or less   Turn/reposition every 2 hours/use positioning/transfer devices   Use pull sheet  Goal: Promote/optimize nutrition  Outcome: Progressing  Goal: Promote skin healing  Outcome: Progressing  Flowsheets (Taken 7/22/2024 2057)  Promote skin healing:   Turn/reposition every 2 hours/use positioning/transfer devices   Protective dressings over bony prominences   Assess skin/pad under line(s)/device(s)   Ensure correct size (line/device) and apply per  instructions     Problem: Diabetes  Goal: Maintain electrolyte levels within acceptable range throughout shift  Outcome: Progressing  Goal: Maintain glucose levels >70mg/dl to <250mg/dl throughout shift  Outcome: Progressing  Goal: No changes in neurological exam by end of shift  Outcome: Progressing  Goal: Learn about and adhere to nutrition recommendations by end of  shift  Outcome: Progressing  Goal: Vital signs within normal range for age by end of shift  Outcome: Progressing  Goal: Increase self care and/or family involovement by end of shift  Outcome: Progressing  Goal: Receive DSME education by end of shift  Outcome: Progressing     Problem: Fall/Injury  Goal: Not fall by end of shift  Outcome: Progressing  Goal: Be free from injury by end of the shift  Outcome: Progressing  Goal: Verbalize understanding of personal risk factors for fall in the hospital  Outcome: Progressing      The clinical goals for the shift include  remain free from injury

## 2024-07-23 NOTE — PROGRESS NOTES
Social Work Transitional Care Note   - ICU TREATMENT PLAN: Patient was admitted to MICU for septic shock.   - Payer: [facility], [facility] secondary only.   -Support System: Mother Shanthi is listed as NOK.   - Planned Disposition: Pending medical outcome and rehab recommendations  - Additional Information: Patient has been staying at [facility] for the past two years. He would like to return there after discharge. PRISCILA sent clinicals to the facility through [facility].  - Barriers to discharge: None at this time. PRISCILA will continue to follow  CHARISMA WEBBER

## 2024-07-23 NOTE — CONSULTS
"Inpatient consult to Infectious Diseases  Consult performed by: Pinky Robles MD  Consult ordered by: Dayton Young MD    Referred by Datyon Young MD    Primary MD: Mauricio Estrada MD    Reason For Consult  Sepsis, blood culture positive for MRSA, with TV vegetation on TTE    History Of Present Illness  Edwar Hemphill is a 39 y.o. male with PMH of ESRD on HD (MWF, right femoral line), HFrEF (EF 35-40% 5/2024), T2DM, PVD (s/p left AKA and right forearm amputation), multiple line-related DVT (RIJ, LIJ, RUE, LUE, RLE, LLE; on Eliquis prior to recent upper GI bleed 6/15), anemia of chronic disease, presenting from [facility] after bp 70s/40s during dialysis session this morning. He reports feeling lightheaded and \"numbness in ears\" in the morning before he went to dialysis, and was told his blood pressures were low. On arrival to ED, patient with bp 112/76 and . Labs significant for lactate 3.3, WBC 20. Blood cultures with gram positive cocci in clusters, now identified as Staph aureus pending sensitivities. Patient was started on Vanc and Zosyn and given 2.5 L of LR with midodrine 10 mg Q8 hours and levo 0.05. He was admitted to MICU, continued on low dose pressors. He denies any fevers/chills, chest pain, sob, abdominal pain, diarrhea, or dysuria. He does report left buttock pain from laying in bed for prolonged periods.    Of note, he has had multiple previous hospitalizations for sepsis 2/2 to CLABSI (dialysis cath) with MRSA bacteremia four times in past year.   6/18/23-6/25/23 (7 days) MRSA bacteremia secondary to HD cath line infection, LLE wound with ESBL E. Coli/polymicrobial. Given Vancomycin and Ciprofloxacin  7/4/23-8/25/23 (52 days) Wet gangrene of LLE. Intra-op cultures positive for ESBL E. Coli, Acinetobacter baumannii, VRE, and Candida Albicans. Received Vanc, Zosyn, Ertapenem, Linezolid oral (2 weeks), Tigecycline, Meropenem  9/14/23-9/21/23 Sepsis 2/2 CLABSI with polymicrobial " bacteremia (right femoral TDC). Blood cultures with Stenotrophomonas maltophilia and Acinetobacter baumanni. Received levofloxacin and tigecycline  11/10/23-11/17/23 CLABSI (right femoral HD cath) with MRSA and Strep dysgalactiae/canis bacteremia likely 2/2 LLE stump abscess. Given Vancomycin for 4 weeks  1/14/24-1/22/24 (8 days) sepsis 2/2 CLABSI with MRSA bacteremia  4/30/24-5/26/24 (26 days) Sepsis with MRSA bacteremia. Received Vancomycin, Linezolid PO, Daptomycin  5/28/24-6/1/24 Concern for pneumonia vs Daptomycin-related pneumonitis. Treated with Vancomycin and Linezolid. Pulmonary toxicity with dapto    Past Medical History  He has a past medical history of Chronic kidney disease, Diabetes mellitus (Multi), ESRD (end stage renal disease) (Multi), Essential (primary) hypertension (05/26/2017), GERD (gastroesophageal reflux disease), Hyperlipidemia, and Hypothyroidism.    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     Social History     Occupational History    Not on file   Tobacco Use    Smoking status: Every Day     Current packs/day: 0.25     Average packs/day: 0.3 packs/day for 15.0 years (3.8 ttl pk-yrs)     Types: Cigarettes    Smokeless tobacco: Never   Substance and Sexual Activity    Alcohol use: Not on file    Drug use: Not on file    Sexual activity: Not on file     Travel History   Travel since 06/23/24    No documented travel since 06/23/24            Family History  Family History   Problem Relation Name Age of Onset    Other  (DIABETES DUE TO UNDERLYING CONDITION WITH MICROALBUMINURIA) Father      Other (DIABETES DUE TO UNDERLYING CONDITION WITH MICROALBUMINURIA [Other]) Other AUNT     Other (DIABETES DUE TO UNDERLYING CONDITION WITH MICROALBUMINURIA [Other]) Other GP      Allergies  Cashew nut and Daptomycin     Immunization History   Administered Date(s) Administered    Flu vaccine (IIV4), preservative free *Check age/dose* 09/17/2020    Pneumococcal polysaccharide vaccine, 23-valent, age 2 years and older (PNEUMOVAX 23) 10/02/2006    Tdap vaccine, age 7 year and older (BOOSTRIX, ADACEL) 10/23/2014     Medications  Home medications:  Medications Prior to Admission   Medication Sig Dispense Refill Last Dose    atorvastatin (Lipitor) 40 mg tablet Take 1 tablet (40 mg) by mouth once daily at bedtime.   Unknown    B complex-vitamin C-folic acid (Nephrocaps) 1 mg capsule Take 1 capsule by mouth once daily.   Unknown    cholecalciferol (Vitamin D-3) 50,000 unit capsule Take 1 capsule (50,000 Units) by mouth 2 times a week. On Monday and Thursday   Unknown    epoetin tyson-epbx (Retacrit) 10,000 unit/mL injection Inject 1 mL (10,000 Units) under the skin 3 times a week. 12 mL 11 Unknown    folic acid (Folvite) 1 mg tablet Take 1 tablet (1 mg) by mouth once daily.   Unknown    insulin glargine (Lantus U-100 Insulin) 100 unit/mL injection Inject 8 Units under the skin once daily at bedtime. Take as directed per insulin instructions.   Unknown    insulin lispro (HumaLOG) 100 unit/mL injection Inject 0-0.15 mL (0-15 Units) under the skin 3 times a day with meals. Take as directed per insulin instructions. (Patient taking differently: Inject 2-10 Units under the skin 3 times daily (morning, midday, late afternoon). If 151-200= 2 units  201-250= 4 units  251-300= 6 units   301-350= 8 units  351-400= 10 units  >400 notify provider) 13.5 mL 11 Unknown    Lactobacillus acidophilus 1 billion cell tablet Take 1 tablet by mouth 2 times a day.   Unknown     levothyroxine (Synthroid, Levoxyl) 125 mcg tablet Take 1 tablet (125 mcg) by mouth once daily in the morning. Take before meals.   Unknown    loperamide (Imodium) 2 mg capsule Take 2 capsules (4 mg) by mouth every 2 hours if needed for diarrhea (FOR LOOSE STOLLS. 2 TABLETS AFTER EVERY EPISODE. DO NOT EXCEED 16MG TOTAL IN 24 HOURS.).   Unknown    melatonin 3 mg tablet Take 2 tablets (6 mg) by mouth once daily at bedtime.   Unknown    midodrine (Proamatine) 10 mg tablet Take 1 tablet (10 mg) by mouth every 4 hours if needed (hypotension).   Unknown    oxyCODONE-acetaminophen (Percocet) 5-325 mg tablet Take 1 tablet by mouth every 6 hours if needed for severe pain (7 - 10). 10 tablet 0 Unknown    pantoprazole (ProtoNix) 40 mg EC tablet Take 1 tablet (40 mg) by mouth once daily in the morning. Take before meals. Do not crush, chew, or split. Do not start before November 17, 2023. 30 tablet 11 Unknown    sevelamer carbonate (Renvela) 800 mg tablet Take 3 tablets (2,400 mg) by mouth 3 times daily (morning, midday, late afternoon).   Unknown    sodium zirconium cyclosilicate (Lokelma) 5 gram packet Take 5 g by mouth 3 times a week. On Tuesday, Thursday and Saturday   Unknown    thiamine 100 mg tablet Take 2 tablets (200 mg) by mouth once daily.   Unknown     Current medications:  Scheduled medications  atorvastatin, 40 mg, oral, Nightly  B complex-vitamin C-folic acid, 1 capsule, oral, Daily  cholecalciferol, 50,000 Units, oral, Once per day on Monday Thursday  epoetin tyson or biosimilar, 10,000 Units, subcutaneous, Once per day on Monday Wednesday Friday  folic acid, 1 mg, oral, Daily  insulin glargine, 4 Units, subcutaneous, Nightly  insulin lispro, 0-10 Units, subcutaneous, TID  lactobacillus acidophilus, 1 tablet, oral, BID  levothyroxine, 125 mcg, oral, Daily before breakfast  melatonin, 6 mg, oral, Nightly  midodrine, 10 mg, oral, q8h  pantoprazole, 40 mg, oral, Daily before breakfast  piperacillin-tazobactam, 2.25  "g, intravenous, q8h  potassium chloride, 20 mEq, intravenous, Once  sevelamer carbonate, 2,400 mg, oral, TID  thiamine, 200 mg, oral, Daily      Continuous medications  norepinephrine, 0.01-1 mcg/kg/min, Last Rate: 0.08 mcg/kg/min (07/23/24 1008)      PRN medications  PRN medications: dextrose, dextrose, glucagon, glucagon, vancomycin    Review of Systems   Constitutional:  Negative for chills and fever.   Respiratory:  Negative for shortness of breath.    Cardiovascular:  Negative for chest pain.   Gastrointestinal:  Negative for abdominal distention, abdominal pain and diarrhea.   Genitourinary:  Negative for dysuria.        Objective  Range of Vitals (last 24 hours)  Heart Rate:  []   Temp:  [35.4 °C (95.7 °F)-37.7 °C (99.9 °F)]   Resp:  [7-29]   BP: ()/(45-85)   Height:  [175.3 cm (5' 9.02\")]   Weight:  [70.9 kg (156 lb 4.9 oz)-73.6 kg (162 lb 4.1 oz)]   SpO2:  [92 %-100 %]   Daily Weight  07/23/24 : 73.6 kg (162 lb 4.1 oz)    Body mass index is 23.95 kg/m².     Physical Exam  HENT:      Head: Normocephalic and atraumatic.   Eyes:      Pupils: Pupils are equal, round, and reactive to light.   Cardiovascular:      Rate and Rhythm: Normal rate and regular rhythm.      Heart sounds: Murmur heard.   Pulmonary:      Effort: Pulmonary effort is normal. No respiratory distress.      Breath sounds: Normal breath sounds.   Abdominal:      General: Abdomen is flat. There is no distension.      Tenderness: There is no abdominal tenderness.   Musculoskeletal:      Right lower leg: Edema present.      Left lower leg: Edema present.   Neurological:      Mental Status: He is alert.          Relevant Results  Labs  Results from last 72 hours   Lab Units 07/23/24  0518 07/22/24  2224 07/22/24  1045   WBC AUTO x10*3/uL 16.3* 16.8* 20.0*   HEMOGLOBIN g/dL 7.6* 7.4* 7.3*   HEMATOCRIT % 20.3* 20.6* 19.9*   PLATELETS AUTO x10*3/uL 50* 59* 65*   NEUTROS PCT AUTO % 86.0 85.2 86.3   LYMPHS PCT AUTO % 5.4 6.1 5.6   MONOS PCT " AUTO % 7.0 6.7 6.5   EOS PCT AUTO % 0.2 0.2 0.1     Results from last 72 hours   Lab Units 07/23/24  0952 07/23/24 0518 07/22/24 2224   SODIUM mmol/L 126* 126* 127*   POTASSIUM mmol/L 3.3* 3.7 3.1*   CHLORIDE mmol/L 89* 92* 91*   CO2 mmol/L 23 21 22   BUN mg/dL 46* 43* 44*   CREATININE mg/dL 6.99* 6.13* 6.09*   GLUCOSE mg/dL 300* 294* 248*   CALCIUM mg/dL 8.4* 7.9* 8.1*   ANION GAP mmol/L 17 17 17   EGFR mL/min/1.73m*2 9* 11* 11*   PHOSPHORUS mg/dL 3.5 3.0 3.0     Results from last 72 hours   Lab Units 07/23/24  0952 07/23/24 0518 07/22/24 2224 07/22/24  1045   ALK PHOS U/L  --  107 115 128*   BILIRUBIN TOTAL mg/dL  --  0.8 0.9 0.8   PROTEIN TOTAL g/dL  --  5.0* 5.1* 5.7*   ALT U/L  --  4* 4* <3*   AST U/L  --  12 7* 11   ALBUMIN g/dL 2.2* 1.9* 2.0* 2.2*     Estimated Creatinine Clearance: 14 mL/min (A) (by C-G formula based on SCr of 6.99 mg/dL (H)).  CRP   Date Value Ref Range Status   07/24/2023 8.04 (A) mg/dL Final     Comment:     REF VALUE  < 1.00     07/24/2023 CANCELED       Comment:     Result canceled by the ancillary.   07/21/2023 11.83 (A) mg/dL Final     Comment:     REF VALUE  < 1.00       Sedimentation Rate   Date Value Ref Range Status   07/02/2023 >130 (H) 0 - 15 mm/h Final   06/18/2023 130 (H) 0 - 15 mm/h Final   02/09/2023 101 (H) 0 - 15 mm/h Final     HIV 1/2 Antigen/Antibody Screen with Reflex to Confirmation   Date Value Ref Range Status   07/22/2024 Nonreactive Nonreactive Final     Hepatitis C Ab   Date Value Ref Range Status   07/16/2021 NONREACTIVE NONREACTIVE Final     Comment:      Results from patients taking biotin supplements or receiving   high-dose biotin therapy should be interpreted with caution   due to possible interference with this test. Providers may    contact their local laboratory for further information.       Microbiology  Susceptibility data from last 90 days.  Collected Specimen Info Organism Ceftaroline Clindamycin Daptomycin Erythromycin Oxacillin Tetracycline  Trimethoprim/Sulfamethoxazole Vancomycin   07/22/24 Blood culture from Peripheral Venipuncture Staphylococcus aureus           07/22/24 Blood culture from Peripheral Venipuncture Staphylococcus aureus           05/17/24 Blood culture from Peripheral Venipuncture Staphylococcus aureus           05/17/24 Blood culture from Dialysis Staphylococcus aureus           05/14/24 Blood culture from Peripheral Venipuncture Staphylococcus aureus           05/14/24 Blood culture from Peripheral Venipuncture Methicillin Resistant Staphylococcus aureus (MRSA)   R   R  R  R  S  S   05/10/24 Blood culture from Peripheral Venipuncture Methicillin Resistant Staphylococcus aureus (MRSA)  S  R  S  R  R  R  S  S   05/10/24 Blood culture from Peripheral Venipuncture Staphylococcus aureus           05/07/24 Blood culture from Peripheral Venipuncture Staphylococcus aureus           05/07/24 Blood culture from Dialysis Staphylococcus aureus           05/05/24 Blood culture from Peripheral Venipuncture Staphylococcus aureus           05/05/24 Blood culture from Peripheral Venipuncture Methicillin Resistant Staphylococcus aureus (MRSA)  R  I R R S S   05/01/24 Blood culture from Peripheral Venipuncture Methicillin Resistant Staphylococcus aureus (MRSA)  R  R R R S S   05/01/24 Blood culture from Dialysis Staphylococcus aureus           04/30/24 Blood culture from Peripheral Venipuncture Staphylococcus aureus           04/30/24 Blood culture from Peripheral Venipuncture Methicillin Resistant Staphylococcus aureus (MRSA)  R  R R R S S     Imaging  Transthoracic Echo (TTE) Limited    Result Date: 7/23/2024   [facility], [address] [place] [address]                Tel [phone] and Fax [phone] TRANSTHORACIC ECHOCARDIOGRAM REPORT  Patient Name:      XIOMARA Maya Physician:    [id number] Penny Shaw MD Study Date:        7/23/2024             Ordering Provider:    [id number] DENISHA SONG MRN/PID:           [id number]             Fellow: Accession#:        [id number]         Nurse: Date of Birth/Age: 1985 / 39 years Sonographer:          Reji Bhakta RDCS Gender:            M                    Additional Staff: Height:            175.26 cm            Admit Date:           7/22/2024 Weight:            73.48 kg             Admission Status:     Inpatient -                                                               Routine BSA / BMI:         1.89 m2 / 23.92      Encounter#:           [id number]                    kg/m2 Blood Pressure:    94/44 mmHg           [address] Study Type:    TRANSTHORACIC ECHO (TTE) LIMITED Diagnosis/ICD: Sepsis, unspecified organism-A41.9 Indication:    concern for endocarditis CPT Code:      Echo Limited-44600; Doppler Limited-72311; Color Doppler-62515 Patient History: Pertinent History: HTN, HLD, septic shock, SIRS, tachycardia, PVVD, ESRD, NICM,                    HFrEF. Study Detail: The following Echo studies were performed: M-Mode, 2D, Doppler and               color flow. Technically challenging study due to prominent lung               artifact, body habitus, patient lying in supine position and poor               acoustic windows. Definity used as a contrast agent for               endocardial border definition. Total contrast used for this               procedure was 4.0 mL via IV push.  Critical Event Critical Event: Test was completed as per department protocol. Critical Finding: Possible Mass and or Endocarditis in right atrium/tricuspid valve. Time Test was Completed: 9:49:00 AM Notified: Dr. Shaw. Attending notification time: 9:55:00 AM  PHYSICIAN INTERPRETATION: Left Ventricle: Left ventricular ejection fraction is moderately decreased, calculated by  Briseno's biplane at 35%. There is global hypokinesis of the left ventricle with minor regional variations. The left ventricular cavity size is normal. Left ventricular diastolic filling was not assessed. There is no definite left ventricular thrombus visualized. Left Atrium: The left atrium was not assessed. Right Ventricle: The right ventricle is normal in size. There is reduced right ventricular systolic function. Right Atrium: The right atrium was not well visualized. There is a device visualized in the right atrium. Several small mobile echodensities noted within the RA likely attached to dialysis catheter and thickened TV with mobile echo densities associated with the TV concerning for possible vegetations with severe TR. Not well seen. Recommend POLI to further assess if clinically indicated. Aortic Valve: The aortic valve is trileaflet. There is mild to moderate aortic valve cusp calcification. There is no evidence of aortic valve regurgitation. Mitral Valve: The mitral valve is mildly thickened. There is mild thickening and calcification of the anterior mitral valve leaflet. There is moderate mitral annular calcification. There is trace mitral valve regurgitation. Tricuspid Valve: The tricuspid valve is abnormal. There is severe tricuspid regurgitation. The Doppler estimated RVSP is mildly elevated at 37.1 mmHg. There is reversed systolic hepatic vein flow. TV with likely mobile echodensities associated tih the leaflets though not well seen with severe TR. RVSP may be underestimated due to severity of TR. Pulmonic Valve: The pulmonic valve is structurally normal. There is physiologic pulmonic valve regurgitation. Pericardium: There is a trivial pericardial effusion. Aorta: The aortic root is normal. Systemic Veins: The inferior vena cava was not well visualized. In comparison to the previous echocardiogram(s): Compared with the prior exam POLI from 5/14/2024 ( [facility]) the TV was previously normal with only  trivial TR. The dialysis catheter seen within the RA is new since the prior POLI and the mobile echodensities and severe TR are new as well. The LV systolic function was already mild to moderately reduced at that time.  CONCLUSIONS:  1. Left ventricular ejection fraction is moderately decreased, calculated by Briseno's biplane at 35%.  2. There is global hypokinesis of the left ventricle with minor regional variations.  3. No left ventricular thrombus visualized.  4. There is reduced right ventricular systolic function.  5. Several small mobile echodensities noted within the RA likely attached to dialysis catheter and thickened TV with mobile echo densities associated with the TV concerning for possible vegetations with severe TR. Not well seen. Recommend POLI to further assess if clinically indicated.  6. There is moderate mitral annular calcification.  7. The tricuspid valve is abnormal.  8. Mildly elevated RVSP.  9. Severe tricuspid regurgitation visualized. 10. TV with likely mobile echodensities associated tih the leaflets though not well seen with severe TR. RVSP may be underestimated due to severity of TR. 11. There is reversed systolic hepatic vein flow. 12. Primary service notified of findings at the time of reporting. 13. Compared with the prior exam POLI from 5/14/2024 ( [facility]) the TV was previously normal with only trivial TR. The dialysis catheter seen within the RA is new since the prior POLI and the mobile echodensities and severe TR are new as well. The LV systolic function was already mild to moderately reduced at that time. QUANTITATIVE DATA SUMMARY: 2D MEASUREMENTS:                          Normal Ranges: IVSd:          0.80 cm   (0.6-1.1cm) LVPWd:         0.75 cm   (0.6-1.1cm) LVIDd:         3.95 cm   (3.9-5.9cm) LVIDs:         3.25 cm LV Mass Index: 51.5 g/m2 LV % FS        17.7 % AORTA MEASUREMENTS:                      Normal Ranges: Ao Sinus, d: 3.20 cm (2.1-3.5cm) LV SYSTOLIC FUNCTION BY 2D  PLANIMETRY (MOD):                      Normal Ranges: EF-A4C View:    40 % (>=55%) EF-A2C View:    29 % EF-Biplane:     35 % LV EF Reported: 35 %  RIGHT VENTRICLE: TAPSE: 18.9 mm RV s'  0.12 m/s TRICUSPID VALVE/RVSP:                             Normal Ranges: Peak TR Velocity: 2.92 m/s RV Syst Pressure: 37.1 mmHg (< 30mmHg)  [id number] Penny Shaw MD Electronically signed on 7/23/2024 at 10:49:40 AM  ** Final **     Point of Care Ultrasound    Result Date: 7/22/2024  Bridger Olguin DO     7/22/2024  7:19 PM Performed by: Brdiger Olguin DO Authorized by: Bridger Olguin DO  Procedure: Cardiac Ultrasound Findings:  Views: parasternal long, parasternal short, apical four and subxiphoid The pericardial space was visualized and was NEGATIVE for a significant pericardial effusion. Activity: Ventricular contractions were visualized. LV: LV systolic function was DECREASED. Impression: Cardiac: The focused cardiac ultrasound exam had ABNORMAL findings as specified. Comments: Patient IVC extremely collapsible.      XR foot right 3+ views    Result Date: 7/22/2024  Interpreted By:  Yeni Mantilla, STUDY: Right ankle, 3 views. Right foot, 3 views.   INDICATION: Signs/Symptoms:osteo concern chronic wound; Signs/Symptoms:c/f osteomyelitis.   COMPARISON: None.   ACCESSION NUMBER(S): [id number]; [id number]   ORDERING CLINICIAN: ANDRÉS PEARSON   STUDY: Right ankle/foot: There is severe demineralization of the bones which limits evaluation for subtle fractures. Questionable age-indeterminate fractures of the 2nd, 3rd, and 4th metatarsal necks with slight impacted appearance and irregularity. Severe vascular calcifications of the ankle and foot. There is ulceration in the posterior aspect of the calcaneus. No osseous erosion, cortical indistinctness, regional osteopenia, or periosteal reaction to suggest radiographic findings of osteomyelitis. Ankle mortise is normally aligned.       Ulceration in the posterior aspect of the  calcaneus without radiographic findings of osteomyelitis.   Severe demineralization of the bones which limits evaluation for subtle fractures.   Questionable age-indeterminate 2nd, 3rd, and 4th metatarsal neck fractures which may be however artifactual. Correlate with history for trauma and point tenderness.   MACRO: None.   Signed by: Yeni Mantilla 7/22/2024 2:54 PM Dictation workstation:   [id number]    XR ankle right 3+ views    Result Date: 7/22/2024  Interpreted By:  Yeni Mantilla, STUDY: Right ankle, 3 views. Right foot, 3 views.   INDICATION: Signs/Symptoms:osteo concern chronic wound; Signs/Symptoms:c/f osteomyelitis.   COMPARISON: None.   ACCESSION NUMBER(S): [id number]; [id number]   ORDERING CLINICIAN: ANDRÉS PEARSON   STUDY: Right ankle/foot: There is severe demineralization of the bones which limits evaluation for subtle fractures. Questionable age-indeterminate fractures of the 2nd, 3rd, and 4th metatarsal necks with slight impacted appearance and irregularity. Severe vascular calcifications of the ankle and foot. There is ulceration in the posterior aspect of the calcaneus. No osseous erosion, cortical indistinctness, regional osteopenia, or periosteal reaction to suggest radiographic findings of osteomyelitis. Ankle mortise is normally aligned.       Ulceration in the posterior aspect of the calcaneus without radiographic findings of osteomyelitis.   Severe demineralization of the bones which limits evaluation for subtle fractures.   Questionable age-indeterminate 2nd, 3rd, and 4th metatarsal neck fractures which may be however artifactual. Correlate with history for trauma and point tenderness.   MACRO: None.   Signed by: Yeni Mantilla 7/22/2024 2:54 PM Dictation workstation:   [id number]    XR chest 1 view    Result Date: 7/22/2024  STUDY: Chest Radiograph;  7/22/2024 10:48AM INDICATION: Hypotension. COMPARISON: 5/28/2024 XR Chest. ACCESSION NUMBER(S): [id number] ORDERING  CLINICIAN: LOUIE NORIEGA TECHNIQUE:  Frontal chest was obtained at 11:42 hours. FINDINGS: CARDIOMEDIASTINAL SILHOUETTE: Cardiomediastinal silhouette is normal in size and configuration.  LUNGS: Linear atelectasis in the left base.  Small hazy opacities in the lung bases similar compared to prior.  Trace pleural effusions.  ABDOMEN: No remarkable upper abdominal findings.  BONES: No acute osseous changes.    Linear atelectasis in the left base. Small hazy opacities in the lung bases similar compared to prior. Trace pleural effusions. Signed by Roe Ruiz MD     Assessment/Plan   Edwar Hemphill is a 39 y.o. male with PMH of ESRD on HD (MWF, right femoral line), HFrEF (EF 35-40% 5/2024), T2DM, and multiple hospitalizations for L AKA infection and MRSA bacteremia who presented from SNF with hypotension. Now with blood cultures growing Staph aureus, sensitivities pending. Likely MRSA given his previous bacteremia with MRSA. He is stabilized on levo 0.08 and midodrine 10 mg and admitted to ICU. No obvious focal findings yet. Pain in left buttocks without sacral ulcer/skin breakage or cellulitis per nurse. Given his right femoral dialysis line, suspicion for source is catheter-associated infection. TTE 7/23 with thickened TV and mobile densities associated with the TV with severe T, not seen previously (TTE 5/14/24 with trivial TR and normal TV). Pending sensitivities but with high chance of MRSA, suspicion for endocarditis, and right femoral cath, there may be difficulty clearing. We will empirically treat with Vancomycin and Ceftaroline for the Staph aureus.    Recommendations:  Recommend POLI for further work up of endocarditis  Stop Zosyn  Start Ceftaroline  Continue Vancomycin  Recommend 2 new sets of blood culture tomorrow  Recommend removal of right femoral line if possible, ideally line holiday for at least 48-72 hours. Will defer to nephrology and primary team regarding line decision.      Pinky Robels MD

## 2024-07-23 NOTE — PROGRESS NOTES
Physical Therapy    Physical Therapy Evaluation    Patient Name: Edwar Hemphill  MRN: [id number]  Today's Date: 7/23/2024   Time Calculation  Start Time: 1051  Stop Time: 1115  Time Calculation (min): 24 min    PT student under the direct supervision of a licensed physical therapist     Assessment/Plan   PT Assessment  PT Assessment Results: Decreased strength, Impaired balance, Decreased mobility, Decreased skin integrity  Rehab Prognosis: Fair  End of Session Communication: Bedside nurse  Assessment Comment: pt is a 39 y.o M who presents with septic shock. pt demos impaired strength and mobility. pt would benefit from continued skilled PT services to address the above deficits.  End of Session Patient Position: Bed, 3 rail up, Alarm off, not on at start of session  IP OR SWING BED PT PLAN  Inpatient or Swing Bed: Inpatient  PT Plan  Treatment/Interventions: Bed mobility, Transfer training, Balance training, Strengthening, Endurance training, Therapeutic exercise, Therapeutic activity, Positioning, Postural re-education  PT Plan: Ongoing PT  PT Frequency: 2 times per week  PT Discharge Recommendations: Moderate intensity level of continued care  PT - OK to Discharge: Yes      Subjective   General Visit Information:  General  Reason for Referral: presented to the ED with  tachycardia and hypotension. pt transferred to the MICU d/t septic shock and requiring pressors. DVT (+) in L UE.  Past Medical History Relevant to Rehab: CKD on HD, DM2, ESRD, HTN, GERD, HLD, hypothyroidism, PVD, HFrEF, anemia, L AKA, RUE amputation above the elbow, R heel unstageable ulcer  Family/Caregiver Present: No  Co-Treatment: OT  Co-Treatment Reason: x2 assist to maximize mobility function. pt irritable. Unsuccessful mobilization by RN.  Prior to Session Communication: Bedside nurse  Patient Position Received: Bed, 3 rail up, Alarm off, not on at start of session  General Comment: pt irritable. Therapist provided concise commands with pt d/t  irritability level. pt is a questionable historian.  Home Living:  Home Living  Type of Home: Skilled Nursing facility  Home Adaptive Equipment: Wheelchair-power (gennaro lift)  Home Living Comments: pt sponge bathes in bed  Prior Level of Function:  Prior Function Per Pt/Caregiver Report  Level of [place]: Needs assistance with ADLs, Needs assistance with homemaking, Needs assistance with functional transfers  Receives Help From:  (Staff)  ADL Assistance: Needs assistance (pt needs assistance with bathing and dressing, but independent with feeding.)  Homemaking Assistance: Needs assistance  Ambulatory Assistance: Needs assistance  Vocational: Unemployed  Leisure: Playing and listening to music and playing video games  Hand Dominance: Right  Prior Function Comments: pt reported no falls within the past 6 months. 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 pt reported that he does not work on sitting EOB, but he gets transferred from bed to wc with the gennaro lift. He also reported working on standing and taking steps with the prosthetic leg.)  Precautions:  Precautions  Hearing/Visual Limitations: Hearing WFL, pt reported difficulty seeing. pt had difficulty reporting correct number of fingers therapist held up.  Medical Precautions: Fall precautions  Precautions Comment: Contact precaution  Vital Signs:  Vital Signs  Heart Rate:  (Pre: 96   Post: 95)  Resp:  (Pre: 21  Post: 18)  SpO2:  (Pre: 100%   Post: 100%)  BP:  (Pre: 85/60 (65)   Post: 91/57 (65))    Objective   Pain:  Pain Assessment  Pain Assessment: 0-10  0-10 (Numeric) Pain Score: 0 - No pain  Cognition:  Cognition  Overall Cognitive Status: Impaired  Orientation Level:  (AOx3)  Following Commands: Follows one step commands with repetition (90%)    General Assessments:  General Observation  General Observation: Levo 0.08, R femoral HD catheter, PIV, tele     Activity Tolerance  Early Mobility/Exercise Safety Screen: Proceed with mobilization - No exclusion  criteria met    Sensation  Sensation Comment: pt reported that he has no sensation deficit. However, dermatomal testing showed impaired sensation in R foot. pt is a questionable historian.    Strength  Strength Comments: Incomplete formal MMT tested d/t pt's irritability level. However, L , shoulder flexion, and elbow flexion/ext >/= 3/5. R DF/PF 3+/5, R knee ext 3-/5.  Postural Control  Postural Control: Impaired (R trunk lean in bed)  Head Control: WFL in bed     Functional Assessments:  Bed Mobility  Bed Mobility: Yes  Bed Mobility 1  Bed Mobility Comments 1: Dependent chair position for 10 mins to assess hemodynamic stability. No significant change in vitals noted. (pt refused to sit EOB and became more irritable even with encouragement)  Bed Mobility 2  Bed Mobility  2: Rolling right, Rolling left  Level of Assistance 2: Maximum assistance, Moderate verbal cues, Moderate tactile cues (x1)  Bed Mobility Comments 2: HOB flattened     Extremity/Trunk Assessments:  RUE   RUE :  (Hx of above elbow amputation. Not assessed, however pt able to move shoulder in flexion and abduction)  LUE   LUE:  (AROM WFL)  RLE   RLE :  (AROM DF/PF WFL, PROM knee ext WFL, hip flexion ~ 80 deg in dependent chair position)  LLE   LLE :  (Hx of AKA. hip flexion ~ 80 deg in dependent chair position)  Outcome Measures:  [facility] Basic Mobility  Turning from your back to your side while in a flat bed without using bedrails: A lot  Moving from lying on your back to sitting on the side of a flat bed without using bedrails: Total  Moving to and from bed to chair (including a wheelchair): Total  Standing up from a chair using your arms (e.g. wheelchair or bedside chair): Total  To walk in hospital room: Total  Climbing 3-5 steps with railing: Total  Basic Mobility - Total Score: 7    FSS-ICU  Ambulation: Unable to attempt due to weakness  Rolling: Maximal assistance (performs 25% - 49% of task)  Sitting: Unable to perform  Transfer  Sit-to-Stand: Unable to perform  Transfer Supine-to-Sit: Unable to perform  Total Score: 2      Early Mobility/Exercise Safety Screen: Proceed with mobilization - No exclusion criteria met  ICU Mobility Scale: Sitting in bed, exercises in bed [1] (Dependent chair position and rolling)    Encounter Problems       Encounter Problems (Active)       Balance       Patient will complete static (ModAx1) and dynamic (MaxAx1) sitting balance activities using UE support as needed in order to maintain midline without acute LOB.        Start:  07/23/24    Expected End:  08/13/24               Mobility       Patient will participate in B LE there-ex program in order to assist in improving strength and to assist with the completion of functional mobility tasks.        Start:  07/23/24    Expected End:  08/13/24            Patient will complete rolling R and L with ModAx1 with HOB elevated and use of a bedrail       Start:  07/23/24    Expected End:  08/13/24            Patient will complete Supine <> Sit with MAXx1 with HOB elevated and use of a bedrail        Start:  07/23/24    Expected End:  08/13/24               Pain - Adult              Education Documentation  Mobility Training, taught by AARON Moss at 7/23/2024  2:09 PM.  Learner: Patient  Readiness: Acceptance  Method: Explanation  Response: Needs Reinforcement  Comment: Mobility progression          Debi De Guzman, SPT

## 2024-07-23 NOTE — PROGRESS NOTES
Xiomara Hemphill is a 39 y.o. male on day 1 of admission presenting with Septic shock (Multi).      Subjective   40 yo male with PMHx of ESRD (MWF via femoral line), HFrEF (LVEF 35-40% on 05/24 POLI), Anemia, DM2, and left AMA from [facility] last month, RUE amputation, HTN, PUD, GERD that presents from dialysis on 7/23 for hypotension and was found to be in septic shock with multiplet line-realted DVTs and infections. 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 On interview patient states he has no CP. States he is trying to not smoke. No other symptoms on ROS.       Objective     Last Recorded Vitals  BP 85/53   Pulse 99   Temp 35.4 °C (95.7 °F) (Temporal)   Resp 17   Wt 73.6 kg (162 lb 4.1 oz)   SpO2 100%   Intake/Output last 3 Shifts:    Intake/Output Summary (Last 24 hours) at 7/23/2024 1407  Last data filed at 7/23/2024 1042  Gross per 24 hour   Intake 770.35 ml   Output --   Net 770.35 ml       Admission Weight  Weight: 68.5 kg (151 lb) (07/22/24 1031)    Daily Weight  07/23/24 : 73.6 kg (162 lb 4.1 oz)    Image Results  Transthoracic Echo (TTE) [facility], [address] [place] [address]                 Tel [phone] and Fax [phone]    TRANSTHORACIC ECHOCARDIOGRAM REPORT       Patient Name:      XIOMARA HEMPHILL         Reading Physician:    [id number] Penny Shaw MD  Study Date:        7/23/2024            Ordering Provider:    [id number] DENISHA SONG  MRN/PID:           [id number]             Fellow:  Accession#:        [id number]         Nurse:  Date of Birth/Age: 1985 / 39 years Sonographer:          Reji Bhakta RDCS  Gender:            M                    Additional Staff:  Height:            175.26 cm             Admit Date:           7/22/2024  Weight:            73.48 kg             Admission Status:     Inpatient -                                                                Routine  BSA / BMI:         1.89 m2 / 23.92      Encounter#:           [id number]                     kg/m2  Blood Pressure:    94/44 mmHg           [address]    Study Type:    TRANSTHORACIC ECHO (TTE) LIMITED  Diagnosis/ICD: Sepsis, unspecified organism-A41.9  Indication:    concern for endocarditis  CPT Code:      Echo Limited-48081; Doppler Limited-28836; Color Doppler-76840    Patient History:  Pertinent History: HTN, HLD, septic shock, SIRS, tachycardia, PVVD, ESRD, NICM,                     HFrEF.    Study Detail: The following Echo studies were performed: M-Mode, 2D, Doppler and                color flow. Technically challenging study due to prominent lung                artifact, body habitus, patient lying in supine position and poor                acoustic windows. Definity used as a contrast agent for                endocardial border definition. Total contrast used for this                procedure was 4.0 mL via IV push.       Critical Event  Critical Event: Test was completed as per department protocol.  Critical Finding: Possible Mass and or Endocarditis in right atrium/tricuspid valve.  Time Test was Completed: 9:49:00 AM  Notified: Dr. Shaw.  Attending notification time: 9:55:00 AM     PHYSICIAN INTERPRETATION:  Left Ventricle: Left ventricular ejection fraction is moderately decreased, calculated by Briseno's biplane at 35%. There is global hypokinesis of the left ventricle with minor regional variations. The left ventricular cavity size is normal. Left ventricular diastolic filling was not assessed. There is no definite left ventricular thrombus visualized.  Left Atrium: The left atrium was not assessed.  Right Ventricle: The right ventricle is normal in size. There is reduced right ventricular  systolic function.  Right Atrium: The right atrium was not well visualized. There is a device visualized in the right atrium. Several small mobile echodensities noted within the RA likely attached to dialysis catheter and thickened TV with mobile echo densities associated with the TV concerning for possible vegetations with severe TR. Not well seen. Recommend POLI to further assess if clinically indicated.  Aortic Valve: The aortic valve is trileaflet. There is mild to moderate aortic valve cusp calcification. There is no evidence of aortic valve regurgitation.  Mitral Valve: The mitral valve is mildly thickened. There is mild thickening and calcification of the anterior mitral valve leaflet. There is moderate mitral annular calcification. There is trace mitral valve regurgitation.  Tricuspid Valve: The tricuspid valve is abnormal. There is severe tricuspid regurgitation. The Doppler estimated RVSP is mildly elevated at 37.1 mmHg. There is reversed systolic hepatic vein flow. TV with likely mobile echodensities associated tih the leaflets though not well seen with severe TR. RVSP may be underestimated due to severity of TR.  Pulmonic Valve: The pulmonic valve is structurally normal. There is physiologic pulmonic valve regurgitation.  Pericardium: There is a trivial pericardial effusion.  Aorta: The aortic root is normal.  Systemic Veins: The inferior vena cava was not well visualized.  In comparison to the previous echocardiogram(s): Compared with the prior exam POLI from 5/14/2024 ( [facility]) the TV was previously normal with only trivial TR. The dialysis catheter seen within the RA is new since the prior POLI and the mobile echodensities and severe TR are new as well. The LV systolic function was already mild to moderately reduced at that time.       CONCLUSIONS:   1. Left ventricular ejection fraction is moderately decreased, calculated by Briseno's biplane at 35%.   2. There is global hypokinesis of the left ventricle  with minor regional variations.   3. No left ventricular thrombus visualized.   4. There is reduced right ventricular systolic function.   5. Several small mobile echodensities noted within the RA likely attached to dialysis catheter and thickened TV with mobile echo densities associated with the TV concerning for possible vegetations with severe TR. Not well seen. Recommend POLI to further assess if clinically indicated.   6. There is moderate mitral annular calcification.   7. The tricuspid valve is abnormal.   8. Mildly elevated RVSP.   9. Severe tricuspid regurgitation visualized.  10. TV with likely mobile echodensities associated tih the leaflets though not well seen with severe TR. RVSP may be underestimated due to severity of TR.  11. There is reversed systolic hepatic vein flow.  12. Primary service notified of findings at the time of reporting.  13. Compared with the prior exam POLI from 5/14/2024 ( [facility]) the TV was previously normal with only trivial TR. The dialysis catheter seen within the RA is new since the prior POLI and the mobile echodensities and severe TR are new as well. The LV systolic function was already mild to moderately reduced at that time.    QUANTITATIVE DATA SUMMARY:  2D MEASUREMENTS:                           Normal Ranges:  IVSd:          0.80 cm   (0.6-1.1cm)  LVPWd:         0.75 cm   (0.6-1.1cm)  LVIDd:         3.95 cm   (3.9-5.9cm)  LVIDs:         3.25 cm  LV Mass Index: 51.5 g/m2  LV % FS        17.7 %    AORTA MEASUREMENTS:                       Normal Ranges:  Ao Sinus, d: 3.20 cm (2.1-3.5cm)    LV SYSTOLIC FUNCTION BY 2D PLANIMETRY (MOD):                       Normal Ranges:  EF-A4C View:    40 % (>=55%)  EF-A2C View:    29 %  EF-Biplane:     35 %  LV EF Reported: 35 %       RIGHT VENTRICLE:  TAPSE: 18.9 mm  RV s'  0.12 m/s    TRICUSPID VALVE/RVSP:                              Normal Ranges:  Peak TR Velocity: 2.92 m/s  RV Syst Pressure: 37.1 mmHg (< 30mmHg)       [id number] Penny  Colin LEÓN  Electronically signed on 7/23/2024 at 10:49:40 AM       ** Final **    Constitutional: Well-developed male in no acute distress.  HEENT: Normocephalic, atraumatic. PERRL. EOMI.  Respiratory: CTA bilaterally. No wheezes, rales, or rhonchi. Normal respiratory effort.  Cardiovascular: RRR. No gallops, or rubs. No JVD. Systolic murmur noted in pulmonic area  Abdominal: Soft, nondistended, nontender to palpation.  Neuro: AOx3. No focal deficit. UE and LE strength 5/5 bilaterally and sensation intact.   MSK: No LE edema bilaterally. BLE symmetrical.  Skin: Warm, dry. No rashes or wounds.   Psych: Appropriate mood and affect.       Assessment/Plan   38 yo male with PMHx of ESRD (MWF via femoral line), HFrEF (LVEF 35-40% on 05/24 POLI), Anemia, DM2, and left AMA from [facility] last month, RUE amputation, HTN, PUD, GERD that presents from dialysis on 7/23 for hypotension and was found to be in septic shock with multiplet line-realted DVTs and infections.    #Septic Shock  #C/f Endocarditis with likely a recurrent line related infection  #Prolonged QT  Patient has had a history of MV endocarditis in 2020, however on TTE this admission it shows RV regurgitation and echodensities associated with the leaflets, however, not well seen with severe TR  - concern for MRSA, awaiting susceptibilities  - Continue abx management per ID  - Recommend POLI to better characterize echo densities on TV  - NPO at Midnight  - Avoid QT prolonging agents  - Maintain K>4 and Mg>2  - agree with ID and CT Surgery consults    #Paroxysmal Afib  - Attempt to re challenge with Eliquis once platelets return to >50      This patient has a central line  Reason for the central line remaining today? Dialysis/Hemapheresis      Principal Problem:    Septic shock (Multi)       David Mobley MD    Patient discussed and seen with Dr. Latif, whom agrees with the plan

## 2024-07-23 NOTE — H&P
Medical Intensive Care - History and Physical   Subjective    Edwar Hemphill is a 39 y.o. year old male patient admitted on 7/22/2024 with following ICU needs: on vasopressor    HPI:  Edwar Hemphill is a 39 y.o. male with PMHx significant for ESRD (MWF  via right femoral line), NICM,  HFrEF (LVEF 35-40% on 5/2024 POLI), anemia, DM2, Left AKA, RUE amputation, HTN, PVD, GERD.  Patient presented to his dialysis session this a.m. and was found to be hypotensive 70s/40s, and was told to brought to the ED.  Upon presentation in the ED patient was found to have blood pressure of 112/76, afebrile, tachycardic with heart rate of 109, respirate of 16 satting well on room air.  Patient's labs revealed hyponatremia, hypokalemia at 3.1, creatinine of 6.88 (patient is ESRD) lactate on presentation was 3.3.  Patient CBC showed leukocytosis with white count of 20 K, hemoglobin of 7.3 (baseline), platelets of 65 (baseline 180s).  Patient was also evaluated by [facility] and was found to have collapsible IVC, BC 2/4 gram psotive cocci in clusters.  Patient was started on Vanc/ Zosyn due to concern for sepsis ( patient has a history of MRSA sepsis) patient was also given a total of 2.5 L of LR in the ED as well as started on low-dose pressors.      Of note patient reports that last HD session on Friday they took excess fluid off from and he was below his dry weight. He reports  he has been taking his medication regularly, including midodrine (last dose this AM).  Patient also reports decreased p.o. intake yesterday due to a lack of appetite, denies nausea, vomiting, fever, cough, and dysuria.      Review of Systems:  Negative unless stated above    ED Course:    Vitals:  Vitals:    07/22/24 1905 07/22/24 1930 07/22/24 1945 07/22/24 2000   BP: 103/61 103/64 106/70    BP Location:       Patient Position:       Pulse: 96 94 93    Resp: 13 (!) 24 (!) 23    Temp:    37.7 °C (99.9 °F)   TempSrc:       SpO2: 97% 94% 96%    Weight:       Height:             Labs:  Labs Reviewed   CBC WITH AUTO DIFFERENTIAL - Abnormal       Result Value    WBC 20.0 (*)     nRBC 0.0      RBC 2.59 (*)     Hemoglobin 7.3 (*)     Hematocrit 19.9 (*)     MCV 77 (*)     MCH 28.2      MCHC 36.7 (*)     RDW 16.7 (*)     Platelets 65 (*)     Neutrophils % 86.3      Immature Granulocytes %, Automated 1.2 (*)     Lymphocytes % 5.6      Monocytes % 6.5      Eosinophils % 0.1      Basophils % 0.3      Neutrophils Absolute 17.27 (*)     Immature Granulocytes Absolute, Automated 0.24      Lymphocytes Absolute 1.11 (*)     Monocytes Absolute 1.29 (*)     Eosinophils Absolute 0.02      Basophils Absolute 0.06     COMPREHENSIVE METABOLIC PANEL - Abnormal    Glucose 122 (*)     Sodium 128 (*)     Potassium 3.1 (*)     Chloride 92 (*)     Bicarbonate 23      Anion Gap 16      Urea Nitrogen 45 (*)     Creatinine 6.88 (*)     eGFR 10 (*)     Calcium 8.6      Albumin 2.2 (*)     Alkaline Phosphatase 128 (*)     Total Protein 5.7 (*)     AST 11      Bilirubin, Total 0.8      ALT <3 (*)    BLOOD GAS VENOUS FULL PANEL - Abnormal    POCT pH, Venous 7.42      POCT pCO2, Venous 35 (*)     POCT pO2, Venous 36      POCT SO2, Venous 59      POCT Oxy Hemoglobin, Venous 57.0      POCT Hematocrit Calculated, Venous 25.0 (*)     POCT Sodium, Venous 123 (*)     POCT Potassium, Venous 3.4 (*)     POCT Chloride, Venous 91 (*)     POCT Ionized Calicum, Venous 1.23      POCT Glucose, Venous 287 (*)     POCT Lactate, Venous 1.1      POCT Base Excess, Venous -1.5      POCT HCO3 Calculated, Venous 22.7      POCT Hemoglobin, Venous 8.2 (*)     POCT Anion Gap, Venous 13.0      Patient Temperature 37.0      FiO2 21     POCT GLUCOSE - Abnormal    POCT Glucose 259 (*)    BLOOD GAS VENOUS FULL PANEL UNSOLICITED - Abnormal    POCT pH, Venous 7.42      POCT pCO2, Venous 38 (*)     POCT pO2, Venous 28 (*)     POCT SO2, Venous 39 (*)     POCT Oxy Hemoglobin, Venous 38.1 (*)     POCT Hematocrit Calculated, Venous 24.0 (*)     POCT  Sodium, Venous 127 (*)     POCT Potassium, Venous 3.1 (*)     POCT Chloride, Venous 93 (*)     POCT Ionized Calicum, Venous 1.26      POCT Glucose, Venous 134 (*)     POCT Lactate, Venous 3.3 (*)     POCT Base Excess, Venous 0.2      POCT HCO3 Calculated, Venous 24.6      POCT Hemoglobin, Venous 7.9 (*)     POCT Anion Gap, Venous 13.0      Patient Temperature 37.0     BLOOD CULTURE - Normal    Blood Culture Loaded on Instrument - Culture in progress     BLOOD CULTURE - Normal    Blood Culture Loaded on Instrument - Culture in progress     TROPONIN I, HIGH SENSITIVITY - Normal    Troponin I, High Sensitivity (CMC) 6     PHOSPHORUS - Normal    Phosphorus 2.5     MAGNESIUM - Normal    Magnesium 1.84     TYPE AND SCREEN    ABO TYPE O      Rh TYPE POS      ANTIBODY SCREEN NEG      RBC Morphology See Below      Polychromasia Mild      Target Cells Few          Imaging:  XR foot right 3+ views   Final Result   Ulceration in the posterior aspect of the calcaneus without   radiographic findings of osteomyelitis.        Severe demineralization of the bones which limits evaluation for   subtle fractures.        Questionable age-indeterminate 2nd, 3rd, and 4th metatarsal neck   fractures which may be however artifactual. Correlate with history   for trauma and point tenderness.      XR ankle right 3+ views   Final Result   Ulceration in the posterior aspect of the calcaneus without   radiographic findings of osteomyelitis.        Severe demineralization of the bones which limits evaluation for   subtle fractures.        Questionable age-indeterminate 2nd, 3rd, and 4th metatarsal neck   fractures which may be however artifactual. Correlate with history   for trauma and point tenderness.      XR chest 1 view   Final Result   Linear atelectasis in the left base. Small hazy opacities in the lung   bases similar compared to prior. Trace pleural effusions.   Signed by Roe Ruiz MD            [place]      Past Medical History:    Diagnosis Date    Chronic kidney disease     Diabetes mellitus (Multi)     ESRD (end stage renal disease) (Multi)     Essential (primary) hypertension 05/26/2017    HTN (hypertension), benign    GERD (gastroesophageal reflux disease)     Hyperlipidemia     Hypothyroidism          PSH     Past Surgical History:   Procedure Laterality Date    ARM AMPUTATION AT ELBOW Right 05/2021    AV FISTULA PLACEMENT W/ PTFE      CT AORTA AND BILATERAL ILIOFEMORAL RUNOFF ANGIOGRAM W AND/OR WO IV CONTRAST  02/09/2023    CT AORTA AND BILATERAL ILIOFEMORAL RUNOFF ANGIOGRAM W AND/OR WO IV CONTRAST 2/9/2023 [facility]    IR CVC EXCHANGE  11/13/2023    IR CVC EXCHANGE 11/13/2023 [facility]    IR CVC PICC  10/19/2023    IR CVC PICC    IR CVC PICC  2/28/2022    IR CVC PICC    LEG AMPUTATION THROUGH KNEE Left 07/08/2023    LEG AMPUTATION THROUGH LOWER TIBIA AND FIBULA Left 07/05/2023    TOE AMPUTATION Left 02/17/2023    left 4th    WOUND DEBRIDEMENT Left 07/26/2023    leg    WOUND DEBRIDEMENT Left 08/02/2023    multiple leg debridements         SH     Social History     Tobacco Use    Smoking status: Every Day     Current packs/day: 0.25     Average packs/day: 0.3 packs/day for 15.0 years (3.8 ttl pk-yrs)     Types: Cigarettes    Smokeless tobacco: Never         FH     Family History   Problem Relation Name Age of Onset    Other (DIABETES DUE TO UNDERLYING CONDITION WITH MICROALBUMINURIA) Father      Other (DIABETES DUE TO UNDERLYING CONDITION WITH MICROALBUMINURIA [Other]) Other AUNT     Other (DIABETES DUE TO UNDERLYING CONDITION WITH MICROALBUMINURIA [Other]) Other GP            Allergies      Allergies  Reviewed by Dany Roberts RN on 7/22/2024        Severity Reactions Comments    Cashew Nut High Anaphylaxis, Swelling cashews                    Meds    Home medications:  Current Outpatient Medications   Medication Instructions    atorvastatin (LIPITOR) 40 mg, oral, Nightly    B complex-vitamin C-folic acid (Nephrocaps)  1 mg capsule 1 capsule, oral, Daily    cholecalciferol (VITAMIN D-3) 50,000 Units, oral, 2 times weekly, On Monday and Thursday    epoetin tyson-epbx (RETACRIT) 10,000 Units, subcutaneous, 3 times weekly    folic acid (FOLVITE) 1 mg, oral, Daily    insulin lispro (HUMALOG) 0-15 Units, subcutaneous, 3 times daily (morning, midday, late afternoon), Take as directed per insulin instructions.    Lactobacillus acidophilus 1 billion cell tablet 1 tablet, oral, 2 times daily    Lantus U-100 Insulin 8 Units, subcutaneous, Nightly, Take as directed per insulin instructions.    levothyroxine (SYNTHROID, LEVOXYL) 125 mcg, oral, Daily before breakfast    loperamide (IMODIUM) 4 mg, oral, Every 2 hour PRN    melatonin 6 mg, oral, Nightly    midodrine (PROAMATINE) 10 mg, oral, Every 4 hours PRN    oxyCODONE-acetaminophen (Percocet) 5-325 mg tablet 1 tablet, oral, Every 6 hours PRN    pantoprazole (PROTONIX) 40 mg, oral, Daily before breakfast, Do not crush, chew, or split.    sevelamer carbonate (RENVELA) 2,400 mg, oral, 3 times daily (morning, midday, late afternoon)    sodium zirconium cyclosilicate (LOKELMA) 5 g, oral, 3 times weekly, On Tuesday, Thursday and Saturday    thiamine (VITAMIN B-1) 200 mg, oral, Daily        Inpatient medications:  Scheduled medications  atorvastatin, 40 mg, oral, Nightly  [START ON 7/23/2024] B complex-vitamin C-folic acid, 1 capsule, oral, Daily  cholecalciferol, 50,000 Units, oral, Once per day on Monday Thursday  epoetin tyson or biosimilar, 10,000 Units, subcutaneous, Once per day on Monday Wednesday Friday  [START ON 7/23/2024] folic acid, 1 mg, oral, Daily  heparin (porcine), 5,000 Units, subcutaneous, q8h  insulin glargine, 4 Units, subcutaneous, Nightly  [START ON 7/23/2024] insulin lispro, 0-10 Units, subcutaneous, TID  lactated Ringer's, 500 mL, intravenous, Once  lactobacillus acidophilus, 1 tablet, oral, BID  [START ON 7/23/2024] levothyroxine, 125 mcg, oral, Daily before  "breakfast  melatonin, 6 mg, oral, Nightly  [START ON 7/23/2024] midodrine, 10 mg, oral, q8h  [START ON 7/23/2024] pantoprazole, 40 mg, oral, Daily before breakfast  piperacillin-tazobactam, 2.25 g, intravenous, q8h  piperacillin-tazobactam, 3.375 g, intravenous, q8h  potassium chloride, 20 mEq, intravenous, Once  potassium chloride CR, 20 mEq, oral, Once  [START ON 7/23/2024] sevelamer carbonate, 2,400 mg, oral, TID  [START ON 7/23/2024] thiamine, 200 mg, oral, Daily      Continuous medications  norepinephrine, 0.01-1 mcg/kg/min, Last Rate: 0.04 mcg/kg/min (07/22/24 1908)      PRN medications  PRN medications: dextrose, dextrose, glucagon, glucagon, vancomycin, vancomycin     Objective    Blood pressure 106/70, pulse 93, temperature 37.7 °C (99.9 °F), resp. rate (!) 23, height 1.753 m (5' 9.02\"), weight 68.5 kg (151 lb), SpO2 96%.     Physical Exam     Constitutional: Well-developed male ,hard of hearing  HEENT: Normocephalic, atraumatic. PERRL. EOMI. No cervical lymphadenopathy.  Respiratory: CTA bilaterally. No wheezes, rales, or rhonchi. Normal respiratory effort.  Cardiovascular: RRR. No murmurs, gallops, or rubs. No JVD.  Abdominal: Soft, nondistended, nontender to palpation. Bowel sounds present. No hepatosplenomegaly or masses. No CVA tenderness.  Neuro: CN II-XII intact. UE and LE strength 5/5 bilaterally and sensation intact. Normal FTN testing.  MSK: No LE edema  Skin: Warm, dry. No rashes or wounds.  Psych: Appropriate mood and affect.    No intake or output data in the 24 hours ending 07/22/24 2300  Labs:   Results from last 72 hours   Lab Units 07/22/24  1045   SODIUM mmol/L 128*   POTASSIUM mmol/L 3.1*   CHLORIDE mmol/L 92*   CO2 mmol/L 23   BUN mg/dL 45*   CREATININE mg/dL 6.88*   GLUCOSE mg/dL 122*   CALCIUM mg/dL 8.6   ANION GAP mmol/L 16   EGFR mL/min/1.73m*2 10*   PHOSPHORUS mg/dL 2.5      Results from last 72 hours   Lab Units 07/22/24  1045   WBC AUTO x10*3/uL 20.0*   HEMOGLOBIN g/dL 7.3* "   HEMATOCRIT % 19.9*   PLATELETS AUTO x10*3/uL 65*   NEUTROS PCT AUTO % 86.3   LYMPHS PCT AUTO % 5.6   MONOS PCT AUTO % 6.5   EOS PCT AUTO % 0.1                    Assessment and Plan     Assessment:  Edwar Hemphill is a 39 y.o. male with PMHx significant for ESRD (MWF  via right femoral line), NICM,  HFrEF (LVEF 35-40% on 5/2024 POLI), anemia, DM2, Left AKA, RUE amputation, HTN, PVD, GERD.  Admitted w/ bcx growing GPCs likelt MRSA 2/2 history MRSA bacetremia. Started on vanc/zosyn, fluid resuscitated, consult ID in the AM.    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      # hx of paroxysmal Afib  #Multiple line-related DVTs   - CHADSVASC 6 (DM, PAD, TIA, HTN, HF)   - b/l subclavian DVT, L IJ DVT, b/l innominate occlusion, complete occlusion of intrahepatic IVC s/p angioplasty   - priorly on Eliquis however recent GI bleed 6/15 at [facility] , eliquis was held and patient was planned for heparin trial but left ama prior to trial   - not a candidate for IVC filter given femoral TDC   - last DVT US w/ resolution of RLE DVT   -on heparin subcutaneous q8h   - will trial heparin sub q when coagulation panel returned    PULMONARY:  NAVI  CXR at bl    RENAL/GENITOURINARY:  #ESRD MWF thru right femoral line   -  iHD TTS since 2016, anuric   - RUE AVG c/b infection s/p RUE amputation above elbow 2021   - multiple TDCs (including iliolumbar)   - femoral TDC recently placed at  (14.5 [place] and 42 cm long)  - lokelma 5 MWF Held iso of hypokalemia  - home sevelmer held    GASTROENTEROLOGY:  NAVI    ENDOCRINOLOGY:  #hypothyroidism  -cw home levo 125    #T1DM  :: home regiment : 8u glargine, SSI  :: Hba1c 7.3  :: home regiment insulin 8u  glargine  -ordered 4 unites glargine and SSI #2    #Esophagitis   #GERD  ::EGD on 6/8 clipped two foci of active esophageal bleeding   -cw home PPI      HEMATOLOGY:  #Chronic anemia  #Anemia of chronic disease  - c/w home epo m/w/f    MUSCULOSKELETAL/ SKIN:  NAVI    INFECTIOUS DISEASE:  #Sepsis with line as Likely  source  - repeat bcx every 48 h until bacteremia resolves  -cx vanc/zosyn  - has previously needed double coverage for 4 weeks with van/dapto to cover infection. But developed lung toxicity 2/2 dapto. And switched to linezolid.  -ID in the AM  - cosnider TTE if repeat cx positive      ICU Check List     FEN:  Fluids: s/p 3L  Electrolytes: k>4 mg>2 caution as ESRD  Nutrition: renal diet    Prophylaxis:  DVT ppx: sub q heparin  GI ppx: PPI  Bowel care: Miralax prn    Hardware:    Access:  PIV, Femoral line      Social:  Code: Full Code    NOK:   Extended Emergency Contact Information  Primary Emergency Contact: Shanthi Hemphill  Mobile Phone: [phone]  Relation: Parent  Secondary Emergency Contact: Terra Scott  Mobile Phone: [phone]  Relation: Friend  Preferred language: English   needed? No       Blaine Gonzalez MD   07/22/24 at 11:00 PM     Disclaimer: Documentation completed with the information available at the time of input. The times in the chart may not be reflective of actual patient care times, interventions, or procedures. Documentation occurs after the physical care of the patient.

## 2024-07-23 NOTE — CONSULTS
"NEPHROLOGY NEW CONSULT NOTE   Edwar Hemphill   39 y.o.    @WT@  MRN/Room: [id number]/24/24-A    Reason for consult: ESRD     HPI:  Edwar Hemphill is a 39 y.o. male with PMH of ESRD on HD (MWF, right femoral line), HFrEF (EF 35-40% 5/2024), T2DM, PVD (s/p left AKA and right forearm amputation), multiple line-related DVT (RIJ, LIJ, RUE, LUE, RLE, LLE; on Eliquis prior to recent upper GI bleed 6/15) presenting from [facility] due to hypotension prior to dialysis this morning.   On arrival to ED he was hypotensive with leukocytosis. Bcx positive for MRSA. Started on vanc, zosyn initially and given 2.5L IV LR. Due to worsening hypotension he was started on levophed and transferred to MICU.     Pt has had multiple hospitalizations 2/2 clabsi and mrsa bacteremia. Had R femoral HD TDC placed in May. History of multiple catheter exchanges due to bacteremia. TTE 7/23 with thickened TV c/f IE.     Gets HD at [facility] [place], nephrologist is Dr. Licona.     In The ER: /70   Pulse 92   Temp 35.4 °C (95.7 °F) (Temporal)   Resp (!) 0   Ht 1.753 m (5' 9.02\")   Wt 73.6 kg (162 lb 4.1 oz)   SpO2 99%   BMI 23.95 kg/m²      Past Medical History:   Diagnosis Date    Chronic kidney disease     Diabetes mellitus (Multi)     ESRD (end stage renal disease) (Multi)     Essential (primary) hypertension 05/26/2017    HTN (hypertension), benign    GERD (gastroesophageal reflux disease)     Hyperlipidemia     Hypothyroidism       Past Surgical History:   Procedure Laterality Date    ARM AMPUTATION AT ELBOW Right 05/2021    AV FISTULA PLACEMENT W/ PTFE      CT AORTA AND BILATERAL ILIOFEMORAL RUNOFF ANGIOGRAM W AND/OR WO IV CONTRAST  02/09/2023    CT AORTA AND BILATERAL ILIOFEMORAL RUNOFF ANGIOGRAM W AND/OR WO IV CONTRAST 2/9/2023 DOCTOR OFFICE LEGACY    IR CVC EXCHANGE  11/13/2023    IR CVC EXCHANGE 11/13/2023 [facility] CVEPINV    IR CVC PICC  10/19/2023    IR CVC PICC    IR CVC PICC  2/28/2022    IR CVC PICC    LEG AMPUTATION THROUGH KNEE Left " 07/08/2023    LEG AMPUTATION THROUGH LOWER TIBIA AND FIBULA Left 07/05/2023    TOE AMPUTATION Left 02/17/2023    left 4th    WOUND DEBRIDEMENT Left 07/26/2023    leg    WOUND DEBRIDEMENT Left 08/02/2023    multiple leg debridements      Family History   Problem Relation Name Age of Onset    Other (DIABETES DUE TO UNDERLYING CONDITION WITH MICROALBUMINURIA) Father      Other (DIABETES DUE TO UNDERLYING CONDITION WITH MICROALBUMINURIA [Other]) Other AUNT     Other (DIABETES DUE TO UNDERLYING CONDITION WITH MICROALBUMINURIA [Other]) Other GP      Social History     Socioeconomic History    Marital status:      Spouse name: Not on file    Number of children: Not on file    Years of education: Not on file    Highest education level: Not on file   Occupational History    Not on file   Tobacco Use    Smoking status: Every Day     Current packs/day: 0.25     Average packs/day: 0.3 packs/day for 15.0 years (3.8 ttl pk-yrs)     Types: Cigarettes    Smokeless tobacco: Never   Substance and Sexual Activity    Alcohol use: Not on file    Drug use: Not on file    Sexual activity: Not on file   Other Topics Concern    Not on file   Social History Narrative    Not on file     Social Determinants of Health     Financial Resource Strain: Low Risk  (7/23/2024)    Overall Financial Resource Strain (CARDIA)     Difficulty of Paying Living Expenses: Not very hard   Food Insecurity: No Food Insecurity (7/23/2024)    Hunger Vital Sign     Worried About Running Out of Food in the Last Year: Never true     Ran Out of Food in the Last Year: Never true   Transportation Needs: No Transportation Needs (7/23/2024)    PRAPARE - Transportation     Lack of Transportation (Medical): No     Lack of Transportation (Non-Medical): No   Physical Activity: Inactive (7/23/2024)    Exercise Vital Sign     Days of Exercise per Week: 0 days     Minutes of Exercise per Session: 0 min   Stress: Not on file   Social Connections: Not on file   Intimate  Partner Violence: Not on file   Housing Stability: Low Risk  (7/23/2024)    Housing Stability Vital Sign     Unable to Pay for Housing in the Last Year: No     Number of Times Moved in the Last Year: 0     Homeless in the Last Year: No       Allergies   Allergen Reactions    Cashew Nut Anaphylaxis and Swelling     cashews    Daptomycin Respiratory depression     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 Hypoxia, bilateral lung infiltrates, and peripheral eosinophilia documented in ID note from that date.        Medications Prior to Admission   Medication Sig Dispense Refill Last Dose    atorvastatin (Lipitor) 40 mg tablet Take 1 tablet (40 mg) by mouth once daily at bedtime.   Unknown    B complex-vitamin C-folic acid (Nephrocaps) 1 mg capsule Take 1 capsule by mouth once daily.   Unknown    cholecalciferol (Vitamin D-3) 50,000 unit capsule Take 1 capsule (50,000 Units) by mouth 2 times a week. On Monday and Thursday   Unknown    epoetin tyson-epbx (Retacrit) 10,000 unit/mL injection Inject 1 mL (10,000 Units) under the skin 3 times a week. 12 mL 11 Unknown    folic acid (Folvite) 1 mg tablet Take 1 tablet (1 mg) by mouth once daily.   Unknown    insulin glargine (Lantus U-100 Insulin) 100 unit/mL injection Inject 8 Units under the skin once daily at bedtime. Take as directed per insulin instructions.   Unknown    insulin lispro (HumaLOG) 100 unit/mL injection Inject 0-0.15 mL (0-15 Units) under the skin 3 times a day with meals. Take as directed per insulin instructions. (Patient taking differently: Inject 2-10 Units under the skin 3 times daily (morning, midday, late afternoon). If 151-200= 2 units  201-250= 4 units  251-300= 6 units   301-350= 8 units  351-400= 10 units  >400 notify provider) 13.5 mL 11 Unknown    Lactobacillus acidophilus 1 billion cell tablet Take 1 tablet by mouth 2 times a day.   Unknown    levothyroxine (Synthroid, Levoxyl) 125 mcg tablet Take 1 tablet (125 mcg) by mouth once daily  in the morning. Take before meals.   Unknown    loperamide (Imodium) 2 mg capsule Take 2 capsules (4 mg) by mouth every 2 hours if needed for diarrhea (FOR LOOSE STOLLS. 2 TABLETS AFTER EVERY EPISODE. DO NOT EXCEED 16MG TOTAL IN 24 HOURS.).   Unknown    melatonin 3 mg tablet Take 2 tablets (6 mg) by mouth once daily at bedtime.   Unknown    midodrine (Proamatine) 10 mg tablet Take 1 tablet (10 mg) by mouth every 4 hours if needed (hypotension).   Unknown    oxyCODONE-acetaminophen (Percocet) 5-325 mg tablet Take 1 tablet by mouth every 6 hours if needed for severe pain (7 - 10). 10 tablet 0 Unknown    pantoprazole (ProtoNix) 40 mg EC tablet Take 1 tablet (40 mg) by mouth once daily in the morning. Take before meals. Do not crush, chew, or split. Do not start before November 17, 2023. 30 tablet 11 Unknown    sevelamer carbonate (Renvela) 800 mg tablet Take 3 tablets (2,400 mg) by mouth 3 times daily (morning, midday, late afternoon).   Unknown    sodium zirconium cyclosilicate (Lokelma) 5 gram packet Take 5 g by mouth 3 times a week. On Tuesday, Thursday and Saturday   Unknown    thiamine 100 mg tablet Take 2 tablets (200 mg) by mouth once daily.   Unknown        Meds:   atorvastatin, 40 mg, Nightly  B complex-vitamin C-folic acid, 1 capsule, Daily  cholecalciferol, 50,000 Units, Once per day on Monday Thursday  epoetin tyson or biosimilar, 10,000 Units, Once per day on Monday Wednesday Friday  folic acid, 1 mg, Daily  insulin glargine, 4 Units, Nightly  insulin lispro, 0-10 Units, TID  lactobacillus acidophilus, 1 tablet, BID  levothyroxine, 125 mcg, Daily before breakfast  melatonin, 6 mg, Nightly  midodrine, 10 mg, q8h  pantoprazole, 40 mg, Daily before breakfast  piperacillin-tazobactam, 2.25 g, q8h  potassium chloride, 20 mEq, Once  sevelamer carbonate, 2,400 mg, TID  thiamine, 200 mg, Daily      norepinephrine, Last Rate: 0.07 mcg/kg/min (07/23/24 1508)  PrismaSol 4/2.5      dextrose, 12.5 g, q15 min  PRN  dextrose, 25 g, q15 min PRN  glucagon, 1 mg, q15 min PRN  glucagon, 1 mg, q15 min PRN  vancomycin, , Daily PRN        Vitals:    07/23/24 1500   BP: 117/70   Pulse: 92   Resp: (!) 0   Temp:    SpO2: 99%        07/21 1900 - 07/23 0659  In: 720.4 [I.V.:170.4]  Out: -    Weight change:      General appearance: AAOx3. No distress  Heart:   Lungs: CTA bilat.    Abdomen: soft, nt/nd  Extremities: no edema bilat, RUE amputation   Neuro: No FND,   ACCESS: R femoral TDC      ASSESSMENT:  Edwar Hemphill is a 39 y.o. male with PMH of ESRD on HD (MWF, right femoral line), HFrEF (EF 35-40% 5/2024), T2DM, PVD (s/p left AKA and right forearm amputation), multiple line-related DVT (RIJ, LIJ, RUE, LUE, RLE, LLE; on Eliquis prior to recent upper GI bleed 6/15) presenting from [facility] due to hypotension prior to dialysis this morning. Admitted to MICU for septic shock. Nephrology consulted for dialysis needs.     #ESRD on HD MWF via R femoral TDC  #Hx of multiple HD catheter infections, mrsa bacteremia   -HD at [facility] [place], nephrologist Dr. Licona - last HD 7/19  -electrolytes: hypokalemia    #Septic shock 2/2 staph aureus bacteremia, concern for infective endocarditis   -vanc, zosyn  -levophed     #MBD - Renvela  #Anemia - epo       Recommendations:  - Given hemodynamic instability recommend initiation of CVVH 4K today. Given staph aureus bacteremia in setting of septic shock patient may need catheter removal and exchange pending clearance however would appreciate input from infectious disease as well. Pt has history of multiple catheter exchanges due to bacteremia so his vascular access is limited  - Please hold Sevalemer given normal Phos levels and with starting CVVH phos levels will reduce further   - strict I/O  - renal MV   - supportive management per ICU team     Mary Hayes DO  Nephrology Fellow  24 hour Renal Pager - [phone]    Discussed with attending nephrologist

## 2024-07-24 ENCOUNTER — APPOINTMENT (OUTPATIENT)
Dept: VASCULAR MEDICINE | Facility: HOSPITAL | Age: 39
End: 2024-07-24
Payer: COMMERCIAL

## 2024-07-24 ENCOUNTER — APPOINTMENT (OUTPATIENT)
Dept: CARDIOLOGY | Facility: HOSPITAL | Age: 39
End: 2024-07-24
Payer: COMMERCIAL

## 2024-07-24 LAB
ALBUMIN SERPL BCP-MCNC: 2 G/DL (ref 3.4–5)
ALP SERPL-CCNC: 101 U/L (ref 33–120)
ALT SERPL W P-5'-P-CCNC: 4 U/L (ref 10–52)
ANION GAP SERPL CALC-SCNC: 15 MMOL/L (ref 10–20)
ANION GAP SERPL CALC-SCNC: 15 MMOL/L (ref 10–20)
APTT PPP: 34 SECONDS (ref 27–38)
AST SERPL W P-5'-P-CCNC: 16 U/L (ref 9–39)
ATRIAL RATE: 99 BPM
BASOPHILS # BLD AUTO: 0.04 X10*3/UL (ref 0–0.1)
BASOPHILS # BLD MANUAL: 0 X10*3/UL (ref 0–0.1)
BASOPHILS NFR BLD AUTO: 0.3 %
BASOPHILS NFR BLD MANUAL: 0 %
BILIRUB DIRECT SERPL-MCNC: 0.2 MG/DL (ref 0–0.3)
BILIRUB SERPL-MCNC: 0.9 MG/DL (ref 0–1.2)
BLOOD EXPIRATION DATE: NORMAL
BUN SERPL-MCNC: 39 MG/DL (ref 6–23)
BUN SERPL-MCNC: 39 MG/DL (ref 6–23)
CA-I BLD-SCNC: 1.15 MMOL/L (ref 1.1–1.33)
CALCIUM SERPL-MCNC: 8.2 MG/DL (ref 8.6–10.6)
CHLORIDE SERPL-SCNC: 95 MMOL/L (ref 98–107)
CHLORIDE SERPL-SCNC: 95 MMOL/L (ref 98–107)
CO2 SERPL-SCNC: 23 MMOL/L (ref 21–32)
CO2 SERPL-SCNC: 23 MMOL/L (ref 21–32)
CREAT SERPL-MCNC: 5.5 MG/DL (ref 0.5–1.3)
CREAT SERPL-MCNC: 5.5 MG/DL (ref 0.5–1.3)
DISPENSE STATUS: NORMAL
EGFRCR SERPLBLD CKD-EPI 2021: 13 ML/MIN/1.73M*2
EGFRCR SERPLBLD CKD-EPI 2021: 13 ML/MIN/1.73M*2
EJECTION FRACTION: 40 %
EOSINOPHIL # BLD AUTO: 0.04 X10*3/UL (ref 0–0.7)
EOSINOPHIL # BLD MANUAL: 0 X10*3/UL (ref 0–0.7)
EOSINOPHIL NFR BLD AUTO: 0.3 %
EOSINOPHIL NFR BLD MANUAL: 0 %
ERYTHROCYTE [DISTWIDTH] IN BLOOD BY AUTOMATED COUNT: 17.3 % (ref 11.5–14.5)
ERYTHROCYTE [DISTWIDTH] IN BLOOD BY AUTOMATED COUNT: 17.3 % (ref 11.5–14.5)
GLUCOSE BLD MANUAL STRIP-MCNC: 119 MG/DL (ref 74–99)
GLUCOSE BLD MANUAL STRIP-MCNC: 123 MG/DL (ref 74–99)
GLUCOSE BLD MANUAL STRIP-MCNC: 132 MG/DL (ref 74–99)
GLUCOSE BLD MANUAL STRIP-MCNC: 155 MG/DL (ref 74–99)
GLUCOSE BLD MANUAL STRIP-MCNC: 161 MG/DL (ref 74–99)
GLUCOSE BLD MANUAL STRIP-MCNC: 53 MG/DL (ref 74–99)
GLUCOSE BLD MANUAL STRIP-MCNC: 79 MG/DL (ref 74–99)
GLUCOSE SERPL-MCNC: 59 MG/DL (ref 74–99)
HAPTOGLOB SERPL-MCNC: 167 MG/DL (ref 37–246)
HCT VFR BLD AUTO: 18.3 % (ref 41–52)
HCT VFR BLD AUTO: 21.1 % (ref 41–52)
HCV RNA SERPL NAA+PROBE-ACNC: NOT DETECTED K[IU]/ML
HCV RNA SERPL NAA+PROBE-LOG IU: NORMAL {LOG_IU}/ML
HGB BLD-MCNC: 6.6 G/DL (ref 13.5–17.5)
HGB BLD-MCNC: 7.5 G/DL (ref 13.5–17.5)
IMM GRANULOCYTES # BLD AUTO: 0.18 X10*3/UL (ref 0–0.7)
IMM GRANULOCYTES # BLD AUTO: 0.23 X10*3/UL (ref 0–0.7)
IMM GRANULOCYTES NFR BLD AUTO: 1.3 % (ref 0–0.9)
IMM GRANULOCYTES NFR BLD AUTO: 1.5 % (ref 0–0.9)
INR PPP: 1.4 (ref 0.9–1.1)
LYMPHOCYTES # BLD AUTO: 0.76 X10*3/UL (ref 1.2–4.8)
LYMPHOCYTES # BLD MANUAL: 1.08 X10*3/UL (ref 1.2–4.8)
LYMPHOCYTES NFR BLD AUTO: 4.9 %
LYMPHOCYTES NFR BLD MANUAL: 7.8 %
MAGNESIUM SERPL-MCNC: 2.02 MG/DL (ref 1.6–2.4)
MCH RBC QN AUTO: 28.4 PG (ref 26–34)
MCH RBC QN AUTO: 28.5 PG (ref 26–34)
MCHC RBC AUTO-ENTMCNC: 35.5 G/DL (ref 32–36)
MCHC RBC AUTO-ENTMCNC: 36.1 G/DL (ref 32–36)
MCV RBC AUTO: 79 FL (ref 80–100)
MCV RBC AUTO: 80 FL (ref 80–100)
MONOCYTES # BLD AUTO: 0.85 X10*3/UL (ref 0.1–1)
MONOCYTES # BLD MANUAL: 0.23 X10*3/UL (ref 0.1–1)
MONOCYTES NFR BLD AUTO: 5.5 %
MONOCYTES NFR BLD MANUAL: 1.7 %
NEUTROPHILS # BLD AUTO: 13.54 X10*3/UL (ref 1.2–7.7)
NEUTROPHILS NFR BLD AUTO: 87.5 %
NEUTS SEG # BLD MANUAL: 12.49 X10*3/UL (ref 1.2–7)
NEUTS SEG NFR BLD MANUAL: 90.5 %
NRBC BLD-RTO: 0 /100 WBCS (ref 0–0)
NRBC BLD-RTO: 0 /100 WBCS (ref 0–0)
P OFFSET: 174 MS
P ONSET: 134 MS
PHOSPHATE SERPL-MCNC: 3.2 MG/DL (ref 2.5–4.9)
PLATELET # BLD AUTO: 27 X10*3/UL (ref 150–450)
PLATELET # BLD AUTO: 74 X10*3/UL (ref 150–450)
POTASSIUM SERPL-SCNC: 4.2 MMOL/L (ref 3.5–5.3)
POTASSIUM SERPL-SCNC: 4.2 MMOL/L (ref 3.5–5.3)
PR INTERVAL: 176 MS
PRODUCT BLOOD TYPE: 5100
PRODUCT CODE: NORMAL
PROT SERPL-MCNC: 5.4 G/DL (ref 6.4–8.2)
PROTHROMBIN TIME: 15.4 SECONDS (ref 9.8–12.8)
Q ONSET: 222 MS
QRS COUNT: 16 BEATS
QRS DURATION: 90 MS
QT INTERVAL: 396 MS
QTC CALCULATION(BAZETT): 508 MS
QTC FREDERICIA: 467 MS
R AXIS: -6 DEGREES
RBC # BLD AUTO: 2.32 X10*6/UL (ref 4.5–5.9)
RBC # BLD AUTO: 2.63 X10*6/UL (ref 4.5–5.9)
RBC MORPH BLD: ABNORMAL
SODIUM SERPL-SCNC: 129 MMOL/L (ref 136–145)
SODIUM SERPL-SCNC: 129 MMOL/L (ref 136–145)
T AXIS: 64 DEGREES
T OFFSET: 420 MS
TARGETS BLD QL SMEAR: ABNORMAL
TOTAL CELLS COUNTED BLD: 116
UNIT ABO: NORMAL
UNIT NUMBER: NORMAL
UNIT RH: NORMAL
UNIT VOLUME: 227
UNIT VOLUME: 282
UNIT VOLUME: 417
VANCOMYCIN TROUGH SERPL-MCNC: 26.7 UG/ML (ref 5–20)
VENTRICULAR RATE: 99 BPM
WBC # BLD AUTO: 13.8 X10*3/UL (ref 4.4–11.3)
WBC # BLD AUTO: 15.5 X10*3/UL (ref 4.4–11.3)
XM INTEP: NORMAL

## 2024-07-24 PROCEDURE — 36415 COLL VENOUS BLD VENIPUNCTURE: CPT | Performed by: STUDENT IN AN ORGANIZED HEALTH CARE EDUCATION/TRAINING PROGRAM

## 2024-07-24 PROCEDURE — 2500000004 HC RX 250 GENERAL PHARMACY W/ HCPCS (ALT 636 FOR OP/ED): Mod: JZ

## 2024-07-24 PROCEDURE — 2500000001 HC RX 250 WO HCPCS SELF ADMINISTERED DRUGS (ALT 637 FOR MEDICARE OP): Performed by: INTERNAL MEDICINE

## 2024-07-24 PROCEDURE — 99153 MOD SED SAME PHYS/QHP EA: CPT | Performed by: INTERNAL MEDICINE

## 2024-07-24 PROCEDURE — 93320 DOPPLER ECHO COMPLETE: CPT

## 2024-07-24 PROCEDURE — P9037 PLATE PHERES LEUKOREDU IRRAD: HCPCS

## 2024-07-24 PROCEDURE — 93971 EXTREMITY STUDY: CPT | Performed by: SURGERY

## 2024-07-24 PROCEDURE — 84075 ASSAY ALKALINE PHOSPHATASE: CPT

## 2024-07-24 PROCEDURE — 6350000001 HC RX 635 EPOETIN >10,000 UNITS: Mod: JZ

## 2024-07-24 PROCEDURE — 99221 1ST HOSP IP/OBS SF/LOW 40: CPT | Performed by: PHYSICIAN ASSISTANT

## 2024-07-24 PROCEDURE — 2500000004 HC RX 250 GENERAL PHARMACY W/ HCPCS (ALT 636 FOR OP/ED): Performed by: INTERNAL MEDICINE

## 2024-07-24 PROCEDURE — P9016 RBC LEUKOCYTES REDUCED: HCPCS

## 2024-07-24 PROCEDURE — 36430 TRANSFUSION BLD/BLD COMPNT: CPT

## 2024-07-24 PROCEDURE — 90937 HEMODIALYSIS REPEATED EVAL: CPT

## 2024-07-24 PROCEDURE — 93005 ELECTROCARDIOGRAM TRACING: CPT

## 2024-07-24 PROCEDURE — 82330 ASSAY OF CALCIUM: CPT | Performed by: INTERNAL MEDICINE

## 2024-07-24 PROCEDURE — 99233 SBSQ HOSP IP/OBS HIGH 50: CPT

## 2024-07-24 PROCEDURE — 83735 ASSAY OF MAGNESIUM: CPT

## 2024-07-24 PROCEDURE — 85007 BL SMEAR W/DIFF WBC COUNT: CPT

## 2024-07-24 PROCEDURE — 99152 MOD SED SAME PHYS/QHP 5/>YRS: CPT

## 2024-07-24 PROCEDURE — 93320 DOPPLER ECHO COMPLETE: CPT | Performed by: INTERNAL MEDICINE

## 2024-07-24 PROCEDURE — 85027 COMPLETE CBC AUTOMATED: CPT

## 2024-07-24 PROCEDURE — 2500000005 HC RX 250 GENERAL PHARMACY W/O HCPCS

## 2024-07-24 PROCEDURE — 2020000001 HC ICU ROOM DAILY

## 2024-07-24 PROCEDURE — 2500000001 HC RX 250 WO HCPCS SELF ADMINISTERED DRUGS (ALT 637 FOR MEDICARE OP)

## 2024-07-24 PROCEDURE — 84100 ASSAY OF PHOSPHORUS: CPT | Performed by: INTERNAL MEDICINE

## 2024-07-24 PROCEDURE — 99291 CRITICAL CARE FIRST HOUR: CPT

## 2024-07-24 PROCEDURE — 80051 ELECTROLYTE PANEL: CPT | Performed by: INTERNAL MEDICINE

## 2024-07-24 PROCEDURE — 93971 EXTREMITY STUDY: CPT

## 2024-07-24 PROCEDURE — 2500000004 HC RX 250 GENERAL PHARMACY W/ HCPCS (ALT 636 FOR OP/ED): Performed by: STUDENT IN AN ORGANIZED HEALTH CARE EDUCATION/TRAINING PROGRAM

## 2024-07-24 PROCEDURE — 85025 COMPLETE CBC W/AUTO DIFF WBC: CPT

## 2024-07-24 PROCEDURE — 84520 ASSAY OF UREA NITROGEN: CPT | Performed by: INTERNAL MEDICINE

## 2024-07-24 PROCEDURE — 85610 PROTHROMBIN TIME: CPT | Performed by: STUDENT IN AN ORGANIZED HEALTH CARE EDUCATION/TRAINING PROGRAM

## 2024-07-24 PROCEDURE — 80202 ASSAY OF VANCOMYCIN: CPT

## 2024-07-24 PROCEDURE — 82248 BILIRUBIN DIRECT: CPT | Performed by: INTERNAL MEDICINE

## 2024-07-24 PROCEDURE — 99152 MOD SED SAME PHYS/QHP 5/>YRS: CPT | Performed by: INTERNAL MEDICINE

## 2024-07-24 PROCEDURE — 2500000004 HC RX 250 GENERAL PHARMACY W/ HCPCS (ALT 636 FOR OP/ED)

## 2024-07-24 PROCEDURE — 93325 DOPPLER ECHO COLOR FLOW MAPG: CPT | Performed by: INTERNAL MEDICINE

## 2024-07-24 PROCEDURE — 99153 MOD SED SAME PHYS/QHP EA: CPT

## 2024-07-24 PROCEDURE — 82565 ASSAY OF CREATININE: CPT | Performed by: INTERNAL MEDICINE

## 2024-07-24 PROCEDURE — 36415 COLL VENOUS BLD VENIPUNCTURE: CPT | Performed by: INTERNAL MEDICINE

## 2024-07-24 PROCEDURE — 2500000002 HC RX 250 W HCPCS SELF ADMINISTERED DRUGS (ALT 637 FOR MEDICARE OP, ALT 636 FOR OP/ED)

## 2024-07-24 PROCEDURE — 99291 CRITICAL CARE FIRST HOUR: CPT | Performed by: INTERNAL MEDICINE

## 2024-07-24 PROCEDURE — 82947 ASSAY GLUCOSE BLOOD QUANT: CPT

## 2024-07-24 PROCEDURE — 93312 ECHO TRANSESOPHAGEAL: CPT | Performed by: INTERNAL MEDICINE

## 2024-07-24 RX ORDER — HEPARIN SODIUM 1000 [USP'U]/ML
INJECTION, SOLUTION INTRAVENOUS; SUBCUTANEOUS
Status: DISPENSED
Start: 2024-07-24 | End: 2024-07-24

## 2024-07-24 RX ORDER — FENTANYL CITRATE 50 UG/ML
100 INJECTION, SOLUTION INTRAMUSCULAR; INTRAVENOUS ONCE
Status: DISCONTINUED | OUTPATIENT
Start: 2024-07-24 | End: 2024-07-25 | Stop reason: WASHOUT

## 2024-07-24 RX ORDER — NYSTATIN 100000 [USP'U]/G
1 POWDER TOPICAL 2 TIMES DAILY
Status: DISCONTINUED | OUTPATIENT
Start: 2024-07-24 | End: 2024-08-22 | Stop reason: HOSPADM

## 2024-07-24 RX ORDER — INSULIN LISPRO 100 [IU]/ML
0-5 INJECTION, SOLUTION INTRAVENOUS; SUBCUTANEOUS EVERY 4 HOURS
Status: DISCONTINUED | OUTPATIENT
Start: 2024-07-24 | End: 2024-07-26

## 2024-07-24 RX ORDER — DIPHENHYDRAMINE HCL 25 MG
25 CAPSULE ORAL EVERY 6 HOURS PRN
Status: DISCONTINUED | OUTPATIENT
Start: 2024-07-24 | End: 2024-08-22 | Stop reason: HOSPADM

## 2024-07-24 RX ORDER — FENTANYL CITRATE 50 UG/ML
INJECTION, SOLUTION INTRAMUSCULAR; INTRAVENOUS
Status: DISPENSED
Start: 2024-07-24 | End: 2024-07-25

## 2024-07-24 RX ORDER — MIDAZOLAM HYDROCHLORIDE 1 MG/ML
2 INJECTION INTRAMUSCULAR; INTRAVENOUS ONCE
Status: DISCONTINUED | OUTPATIENT
Start: 2024-07-24 | End: 2024-07-25 | Stop reason: WASHOUT

## 2024-07-24 RX ORDER — FENTANYL CITRATE 50 UG/ML
INJECTION, SOLUTION INTRAMUSCULAR; INTRAVENOUS AS NEEDED
Status: DISCONTINUED | OUTPATIENT
Start: 2024-07-24 | End: 2024-07-29 | Stop reason: HOSPADM

## 2024-07-24 RX ORDER — MIDAZOLAM HYDROCHLORIDE 1 MG/ML
INJECTION INTRAMUSCULAR; INTRAVENOUS
Status: DISPENSED
Start: 2024-07-24 | End: 2024-07-25

## 2024-07-24 RX ORDER — MIDAZOLAM HYDROCHLORIDE 1 MG/ML
INJECTION INTRAMUSCULAR; INTRAVENOUS CODE/TRAUMA/SEDATION MEDICATION
Status: COMPLETED | OUTPATIENT
Start: 2024-07-24 | End: 2024-07-24

## 2024-07-24 RX ORDER — MIDAZOLAM HYDROCHLORIDE 1 MG/ML
INJECTION INTRAMUSCULAR; INTRAVENOUS AS NEEDED
Status: DISCONTINUED | OUTPATIENT
Start: 2024-07-24 | End: 2024-07-29 | Stop reason: HOSPADM

## 2024-07-24 RX ORDER — FENTANYL CITRATE 50 UG/ML
INJECTION, SOLUTION INTRAMUSCULAR; INTRAVENOUS CODE/TRAUMA/SEDATION MEDICATION
Status: COMPLETED | OUTPATIENT
Start: 2024-07-24 | End: 2024-07-24

## 2024-07-24 RX ORDER — VANCOMYCIN HYDROCHLORIDE 500 MG/100ML
500 INJECTION, SOLUTION INTRAVENOUS EVERY 12 HOURS
Status: DISCONTINUED | OUTPATIENT
Start: 2024-07-24 | End: 2024-07-27

## 2024-07-24 RX ORDER — INSULIN GLARGINE 100 [IU]/ML
2 INJECTION, SOLUTION SUBCUTANEOUS NIGHTLY
Status: DISCONTINUED | OUTPATIENT
Start: 2024-07-24 | End: 2024-07-26

## 2024-07-24 ASSESSMENT — PAIN - FUNCTIONAL ASSESSMENT
PAIN_FUNCTIONAL_ASSESSMENT: 0-10

## 2024-07-24 ASSESSMENT — ENCOUNTER SYMPTOMS
FEVER: 0
HEADACHES: 0
CHILLS: 0
ABDOMINAL PAIN: 0
DIARRHEA: 0
EYE PAIN: 0
SHORTNESS OF BREATH: 0

## 2024-07-24 ASSESSMENT — PAIN SCALES - GENERAL
PAINLEVEL_OUTOF10: 0 - NO PAIN
PAINLEVEL_OUTOF10: 10 - WORST POSSIBLE PAIN

## 2024-07-24 NOTE — CARE PLAN
The patient's goals for the shift include      The clinical goals for the shift include patient will maintan a MAP of 65 or greater throughout the shift.    Problem: Pain - Adult  Goal: Verbalizes/displays adequate comfort level or baseline comfort level  Outcome: Progressing     Problem: Safety - Adult  Goal: Free from fall injury  Outcome: Progressing     Problem: Skin  Goal: Participates in plan/prevention/treatment measures  Outcome: Progressing  Flowsheets (Taken 7/22/2024 2057 by Brynn Ortiz RN)  Participates in plan/prevention/treatment measures: Elevate heels  Goal: Prevent/manage excess moisture  Outcome: Progressing  Flowsheets (Taken 7/23/2024 2153 by Alicia Juarez RN)  Prevent/manage excess moisture:   Cleanse incontinence/protect with barrier cream   Moisturize dry skin   Monitor for/manage infection if present  Goal: Prevent/minimize sheer/friction injuries  Outcome: Progressing  Flowsheets (Taken 7/22/2024 2057 by Brynn Ortiz RN)  Prevent/minimize sheer/friction injuries:   HOB 30 degrees or less   Turn/reposition every 2 hours/use positioning/transfer devices   Use pull sheet  Goal: Promote/optimize nutrition  Outcome: Progressing  Goal: Promote skin healing  Outcome: Progressing  Flowsheets (Taken 7/23/2024 2153 by Alicia Juarez RN)  Promote skin healing:   Assess skin/pad under line(s)/device(s)   Turn/reposition every 2 hours/use positioning/transfer devices   Rotate device position/do not position patient on device   Protective dressings over bony prominences   Ensure correct size (line/device) and apply per  instructions

## 2024-07-24 NOTE — CARE PLAN
Edwar Hemphill is a 39 y.o. male with PMH of ESRD on HD (MWF, right femoral line), HFrEF (EF 35-40% 5/2024), T2DM, PVD (s/p left AKA and right forearm amputation), multiple line-related DVT (RIJ, LIJ, RUE, LUE, RLE, LLE; on Eliquis prior to recent upper GI bleed 6/15) presenting from [facility] due to hypotension prior to dialysis this morning. Admitted to MICU for septic shock. Nephrology consulted for dialysis needs.      #ESRD on HD MWF via R femoral TDC  #Hx of multiple HD catheter infections, mrsa bacteremia   -HD at [facility] [place], nephrologist Dr. Licona - last HD 7/19  Started on CVVH 7/23 due to hemodynamic instability.      #Septic shock 2/2 staph aureus bacteremia, concern for infective endocarditis   -vanc, zosyn  -levophed      #MBD - Renvela  #Anemia - epo         Recommendations:  - Access pressures elevated so will plan for cathflo and reattempt CVVH 4K as pt remains on levophed.   -Pt with staph aureus bacteremia in setting of septic shock and c/f IE. However with known difficulties obtaining vascular access in the past could consider catheter exchange by IR. Repeat bcx today to assess for clearance  - hold Sevalemer given normal Phos levels and with starting CVVH phos levels will reduce further   - supportive management per ICU team      Mary Hayes DO  Nephrology Fellow  24 hour Renal Pager - [phone]

## 2024-07-24 NOTE — CONSULTS
[facility]  SURGERY - CONSULT    Patient Name: Edwar Hemphill  MRN: [id number]  Admit Date: 722  : 1985  AGE: 39 y.o.   GENDER: male  ==============================================================================  TODAY'S ASSESSMENT AND PLAN OF CARE:  Source of bacteremia and endocarditis likely to be R femoral dialysis catheter, thus patient would potential need a new dialysis line  Patient would benefit from IR intervention given that he will continue to require dialysis, thus would defer to IR to determine the optimal target vein    ==============================================================================  CHIEF COMPLAINT/REASON FOR CONSULT:  Identify vein for dialysis catheter    This is a 40 yo M with PMH of ESRD (MWF via right femoral line), HFrEF, paroxymal afib, DM2, left AKA, RUE (above elbow) amputation, and multiple dialysis line placement by IR, found to be tachycardic and hypotensive at dialysis, thus he was admitted for management.  He was found to be in septic shock, bacteremia, and endocarditis.  TTE showed TV vegetation and possible source could be femoral catheter.  Patient has extensive history of DVTs.  Per chart review, he has had common femoral VTE in 2024, and other hx include b/l subclavian DVT, L internal jugular DVT, b/l innominate occlusion.  Therefore, vascular surgery is consulted for alternative options for dialysis catheter placement.        Pt d/w attending surgeon, Dr. Sofia,    Yanni Naranjo MD  General Surgery, PGY-3  Please page service pager with questions  Vascular Surgery [phone]    Fellow Addendum:  Discussed case with ICU staff and Dr. Sofia. Acknowledge findings of valvular vegetations, and vegetations on catheter tip (tunneled R fem line 42 cm catheter with tip in R atrium). Would recommend removal of catheter in IR. If open tip removal is desired, would be performed by cardiac surgery. Or alternatively, the EVLS service  does perform endovascular vegectomy for certain patients, potentially they could help.  Chema Waggoner MD  Vascular Surgery Fellow  Service Pager: [phone]    PAST MEDICAL HISTORY:   PMH: Per HPI  Past Medical History:   Diagnosis Date    Chronic kidney disease     Diabetes mellitus (Multi)     ESRD (end stage renal disease) (Multi)     Essential (primary) hypertension 05/26/2017    HTN (hypertension), benign    GERD (gastroesophageal reflux disease)     Hyperlipidemia     Hypothyroidism          PSH:   Past Surgical History:   Procedure Laterality Date    ARM AMPUTATION AT ELBOW Right 05/2021    AV FISTULA PLACEMENT W/ PTFE      CT AORTA AND BILATERAL ILIOFEMORAL RUNOFF ANGIOGRAM W AND/OR WO IV CONTRAST  02/09/2023    CT AORTA AND BILATERAL ILIOFEMORAL RUNOFF ANGIOGRAM W AND/OR WO IV CONTRAST 2/9/2023 DOCTOR OFFICE LEGACY    IR CVC EXCHANGE  11/13/2023    IR CVC EXCHANGE 11/13/2023 [facility] CVEPINV    IR CVC PICC  10/19/2023    IR CVC PICC    IR CVC PICC  2/28/2022    IR CVC PICC    LEG AMPUTATION THROUGH KNEE Left 07/08/2023    LEG AMPUTATION THROUGH LOWER TIBIA AND FIBULA Left 07/05/2023    TOE AMPUTATION Left 02/17/2023    left 4th    WOUND DEBRIDEMENT Left 07/26/2023    leg    WOUND DEBRIDEMENT Left 08/02/2023    multiple leg debridements     FH:   Family History   Problem Relation Name Age of Onset    Other (DIABETES DUE TO UNDERLYING CONDITION WITH MICROALBUMINURIA) Father      Other (DIABETES DUE TO UNDERLYING CONDITION WITH MICROALBUMINURIA [Other]) Other AUNT     Other (DIABETES DUE TO UNDERLYING CONDITION WITH MICROALBUMINURIA [Other]) Other GP      SOCIAL HISTORY:  Smoker      MEDICATIONS:   Prior to Admission medications    Medication Sig Start Date End Date Taking? Authorizing Provider   midodrine (Proamatine) 5 mg tablet Take 1 tablet (5 mg) by mouth once daily on dialysis days for 15 doses. 5/25/24 7/22/24 Yes Mauricio Estrada MD   atorvastatin (Lipitor) 40 mg tablet Take 1 tablet (40 mg) by mouth once  daily at bedtime.    Historical Provider, MD   B complex-vitamin C-folic acid (Nephrocaps) 1 mg capsule Take 1 capsule by mouth once daily.    Matthew Provider, MD   cholecalciferol (Vitamin D-3) 50,000 unit capsule Take 1 capsule (50,000 Units) by mouth 2 times a week. On Monday and Thursday    Matthew Murillo MD   epoetin tyson-epbx (Retacrit) 10,000 unit/mL injection Inject 1 mL (10,000 Units) under the skin 3 times a week. 1/22/24 1/21/25  AMARILYS Carmichael-CNP   folic acid (Folvite) 1 mg tablet Take 1 tablet (1 mg) by mouth once daily.    Historical Provider, MD   insulin glargine (Lantus U-100 Insulin) 100 unit/mL injection Inject 8 Units under the skin once daily at bedtime. Take as directed per insulin instructions.    Matthew Provider, MD   insulin lispro (HumaLOG) 100 unit/mL injection Inject 0-0.15 mL (0-15 Units) under the skin 3 times a day with meals. Take as directed per insulin instructions.  Patient taking differently: Inject 2-10 Units under the skin 3 times daily (morning, midday, late afternoon). If 151-200= 2 units  201-250= 4 units  251-300= 6 units   301-350= 8 units  351-400= 10 units  >400 notify provider 1/22/24 1/21/25  AMARILYS Carmichael-CNP   Lactobacillus acidophilus 1 billion cell tablet Take 1 tablet by mouth 2 times a day.    Historical Provider, MD   levothyroxine (Synthroid, Levoxyl) 125 mcg tablet Take 1 tablet (125 mcg) by mouth once daily in the morning. Take before meals.    Historical Provider, MD   loperamide (Imodium) 2 mg capsule Take 2 capsules (4 mg) by mouth every 2 hours if needed for diarrhea (FOR LOOSE STOLLS. 2 TABLETS AFTER EVERY EPISODE. DO NOT EXCEED 16MG TOTAL IN 24 HOURS.).    Matthew Provider, MD   melatonin 3 mg tablet Take 2 tablets (6 mg) by mouth once daily at bedtime.    Historical Provider, MD   midodrine (Proamatine) 10 mg tablet Take 1 tablet (10 mg) by mouth every 4 hours if needed (hypotension).    Historical Provider, MD    oxyCODONE-acetaminophen (Percocet) 5-325 mg tablet Take 1 tablet by mouth every 6 hours if needed for severe pain (7 - 10). 11/16/23   OSBALDO Carmichael   pantoprazole (ProtoNix) 40 mg EC tablet Take 1 tablet (40 mg) by mouth once daily in the morning. Take before meals. Do not crush, chew, or split. Do not start before November 17, 2023. 11/17/23 11/16/24  OSBALDO Carmichael   sevelamer carbonate (Renvela) 800 mg tablet Take 3 tablets (2,400 mg) by mouth 3 times daily (morning, midday, late afternoon). 3/25/22   Historical Provider, MD   sodium zirconium cyclosilicate (Lokelma) 5 gram packet Take 5 g by mouth 3 times a week. On Tuesday, Thursday and Saturday    Historical Provider, MD   thiamine 100 mg tablet Take 2 tablets (200 mg) by mouth once daily.    Historical Provider, MD   acetaminophen (Tylenol) 325 mg tablet Take 2 tablets (650 mg) by mouth every 4 hours if needed for moderate pain (4 - 6). 11/16/23 7/22/24  OSBALDO Carmichael   insulin glargine (Lantus) 100 unit/mL injection Inject 2 Units under the skin once daily at bedtime. Take as directed per insulin instructions. 5/25/24 7/22/24  Mauricio Estrada MD   nitroglycerin (Nitrostat) 0.4 mg SL tablet Place 1 tablet (0.4 mg) under the tongue every 5 minutes if needed for chest pain. 11/11/22 7/22/24  Historical Provider, MD   simethicone (Mylicon) 80 mg chewable tablet Chew 1 tablet (80 mg) every 8 hours if needed for flatulence.  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 Hypoxia, bilateral lung infiltrates, and peripheral eosinophilia documented in ID note from that date.       REVIEW OF SYSTEMS:  Review of Systems  PHYSICAL EXAM:  Physical Exam  Constitutional: no acute distress  Neuro: A/O x4, no gross deficits   Psych: normal affect  HEENT: No  deformities, no scleral icterus   Cardiac: RR  Pulmonary: unlabored respirations   Abdomen: soft, non distended, non tender  Skin: warm and dry overall    Extremities: no swelling noted; L AKA, RUE amputation, femoral CVC in R groin    IMAGING SUMMARY:  (summary of findings, not a copy of dictation)      LABS:  Results from last 7 days   Lab Units 07/24/24  0339 07/23/24  0518 07/22/24  2224 07/22/24  1045   WBC AUTO x10*3/uL 13.8* 16.3* 16.8* 20.0*   HEMOGLOBIN g/dL 7.5* 7.6* 7.4* 7.3*   HEMATOCRIT % 21.1* 20.3* 20.6* 19.9*   PLATELETS AUTO x10*3/uL 27* 50* 59* 65*   NEUTROS PCT AUTO %  --  86.0 85.2 86.3   LYMPHO PCT MAN % 7.8  --   --   --    LYMPHS PCT AUTO %  --  5.4 6.1 5.6   MONO PCT MAN % 1.7  --   --   --    MONOS PCT AUTO %  --  7.0 6.7 6.5   EOSINO PCT MAN % 0.0  --   --   --    EOS PCT AUTO %  --  0.2 0.2 0.1     Results from last 7 days   Lab Units 07/24/24  0345 07/22/24  2224   APTT seconds 34 33   INR  1.4* 1.4*     Results from last 7 days   Lab Units 07/24/24  0339 07/23/24  1809 07/23/24  0952 07/23/24  0518 07/22/24  2224   SODIUM mmol/L 129*  129*  --  126* 126* 127*   POTASSIUM mmol/L 4.2  4.2  --  3.3* 3.7 3.1*   CHLORIDE mmol/L 95*  95*  --  89* 92* 91*   CO2 mmol/L 23 23  --  23 21 22   BUN mg/dL 39*  39* 47* 46* 43* 44*   CREATININE mg/dL 5.50*  5.50* 6.99* 6.99* 6.13* 6.09*   CALCIUM mg/dL 8.2*  --  8.4* 7.9* 8.1*   PROTEIN TOTAL g/dL 5.4*  --   --  5.0* 5.1*   BILIRUBIN TOTAL mg/dL 0.9  --   --  0.8 0.9   ALK PHOS U/L 101  --   --  107 115   ALT U/L 4*  --   --  4* 4*   AST U/L 16  --   --  12 7*   GLUCOSE mg/dL 59*  --  300* 294* 248*     Results from last 7 days   Lab Units 07/24/24  0339 07/23/24  0518 07/22/24  2224   BILIRUBIN TOTAL mg/dL 0.9 0.8 0.9   BILIRUBIN DIRECT mg/dL 0.2  --   --              I have reviewed all laboratory and imaging results ordered/pertinent for this encounter.

## 2024-07-24 NOTE — PROGRESS NOTES
Vancomycin Dosing by Pharmacy- FOLLOW UP    Edwar Hemphill is a 39 y.o. year old male who Pharmacy has been consulted for vancomycin dosing for other bacteremia . Based on the patient's indication and renal status this patient is being dosed based on a goal trough/random level of 15-20.     Renal function is currently dependant on CVVH.    Current vancomycin dose: 750 mg given every 12 hours    Most recent trough level: 26 mcg/mL    Visit Vitals  /71   Pulse 96   Temp 36.8 °C (98.2 °F) (Temporal)   Resp 16        Lab Results   Component Value Date    CREATININE 5.50 (H) 2024    CREATININE 5.50 (H) 2024    CREATININE 6.99 (H) 2024    CREATININE 6.99 (H) 2024        Patient weight is as follows:   Vitals:    24   Weight: 73.6 kg (162 lb 4.1 oz)       Cultures:  Susceptibility data for the encounter in last 14 days.  Collected Specimen Info Organism   24 Blood culture from Peripheral Venipuncture Staphylococcus aureus   24 Blood culture from Peripheral Venipuncture Staphylococcus aureus        I/O last 3 completed shifts:  In: 1290.5 (18.2 mL/kg) [P.O.:240; I.V.:300.5 (4.2 mL/kg); IV Piggyback:750]  Out: - (0 mL/kg)   Dosing Weight: 70.9 kg   I/O during current shift:  I/O this shift:  In: 307.1 [I.V.:107.1; IV Piggyback:200]  Out: -     Temp (24hrs), Av.3 °C (97.4 °F), Min:35.4 °C (95.7 °F), Max:36.9 °C (98.4 °F)      Assessment/Plan    Above goal random/trough level. Orders placed for new vancomycin regimen of 500 every 12 hours to begin at 17:00 .  The next level will be obtained on  at 16:00. May be obtained sooner if clinically indicated.   Will continue to monitor renal function daily while on vancomycin and order serum creatinine at least every 48 hours if not already ordered.  Follow for continued vancomycin needs, clinical response, and signs/symptoms of toxicity.       Matilda Franks, PharmD

## 2024-07-24 NOTE — CARE PLAN
The patient's goals for the shift include      The clinical goals for the shift include patient will maintan a MAP of 65 or greater throughout the shift.      Problem: Pain - Adult  Goal: Verbalizes/displays adequate comfort level or baseline comfort level  Outcome: Progressing     Problem: Safety - Adult  Goal: Free from fall injury  Outcome: Progressing     Problem: Chronic Conditions and Co-morbidities  Goal: Patient's chronic conditions and co-morbidity symptoms are monitored and maintained or improved  Outcome: Progressing     Problem: Skin  Goal: Decreased wound size/increased tissue granulation at next dressing change  Outcome: Progressing  Flowsheets (Taken 7/22/2024 2057 by Brynn Ortiz RN)  Decreased wound size/increased tissue granulation at next dressing change:   Protective dressings over bony prominences   Promote sleep for wound healing  Goal: Participates in plan/prevention/treatment measures  Outcome: Progressing  Flowsheets (Taken 7/22/2024 2057 by Brynn Ortiz RN)  Participates in plan/prevention/treatment measures: Elevate heels  Goal: Prevent/manage excess moisture  Outcome: Progressing  Flowsheets (Taken 7/23/2024 2153)  Prevent/manage excess moisture:   Cleanse incontinence/protect with barrier cream   Moisturize dry skin   Monitor for/manage infection if present  Goal: Prevent/minimize sheer/friction injuries  Outcome: Progressing  Flowsheets (Taken 7/22/2024 2057 by Brynn Ortiz RN)  Prevent/minimize sheer/friction injuries:   HOB 30 degrees or less   Turn/reposition every 2 hours/use positioning/transfer devices   Use pull sheet  Goal: Promote/optimize nutrition  Outcome: Progressing  Flowsheets (Taken 7/23/2024 2153)  Promote/optimize nutrition:   Reassess MST if dietician not consulted   Offer water/supplements/favorite foods   Discuss with provider if NPO > 2 days  Goal: Promote skin healing  Outcome: Progressing  Flowsheets (Taken 7/23/2024 2153)  Promote skin healing:    Assess skin/pad under line(s)/device(s)   Turn/reposition every 2 hours/use positioning/transfer devices   Rotate device position/do not position patient on device   Protective dressings over bony prominences   Ensure correct size (line/device) and apply per  instructions     Problem: Fall/Injury  Goal: Not fall by end of shift  Outcome: Progressing  Goal: Be free from injury by end of the shift  Outcome: Progressing  Goal: Verbalize understanding of personal risk factors for fall in the hospital  Outcome: Progressing     Problem: Diabetes  Goal: Maintain electrolyte levels within acceptable range throughout shift  Outcome: Progressing  Goal: Maintain glucose levels >70mg/dl to <250mg/dl throughout shift  Outcome: Progressing  Goal: No changes in neurological exam by end of shift  Outcome: Progressing  Goal: Learn about and adhere to nutrition recommendations by end of shift  Outcome: Progressing  Goal: Vital signs within normal range for age by end of shift  Outcome: Progressing  Goal: Increase self care and/or family involovement by end of shift  Outcome: Progressing  Goal: Receive DSME education by end of shift  Outcome: Progressing

## 2024-07-24 NOTE — CARE PLAN
P  Problem: Skin  Goal: Promote/optimize nutrition  7/24/2024 1012 by Fanny Laguna RN  Flowsheets (Taken 7/24/2024 1012)  Promote/optimize nutrition:   Reassess MST if dietician not consulted   Monitor/record intake including meals  7/24/2024 0937 by Fanny Laguna RN  Outcome: Progressing

## 2024-07-24 NOTE — PROGRESS NOTES
"Medical Intensive Care - Daily Progress Note   Subjective    Edwar Hemphill is a 39 y.o. year old male patient admitted on 7/22/2024 with following ICU needs: septic shock required pressors, infective endocarditis     Interval History:  Last night he had one episode of hypoglycemia, when he was NPO which resolved with dextrose. Patient had a wrist pain and he refused getting BC drawn. He is laying on his bed, doesn't have chest pain, SOB, N/V or any other complaints now.     Meds    Scheduled medications  atorvastatin, 40 mg, oral, Nightly  B complex-vitamin C-folic acid, 1 capsule, oral, Daily  ceftaroline, 400 mg, intravenous, q8h  cholecalciferol, 50,000 Units, oral, Once per day on Monday Thursday  epoetin tyson or biosimilar, 10,000 Units, subcutaneous, Once per day on Monday Wednesday Friday  folic acid, 1 mg, oral, Daily  heparin, , ,   insulin glargine, 4 Units, subcutaneous, Nightly  insulin lispro, 0-10 Units, subcutaneous, TID  levothyroxine, 125 mcg, oral, Daily before breakfast  melatonin, 6 mg, oral, Nightly  midodrine, 10 mg, oral, q8h  nystatin, 1 Application, Topical, BID  pantoprazole, 40 mg, oral, Daily before breakfast  potassium chloride, 20 mEq, intravenous, Once  [Held by provider] sevelamer carbonate, 2,400 mg, oral, TID  thiamine, 200 mg, oral, Daily  vancomycin, 500 mg, intravenous, q12h      Continuous medications  norepinephrine, 0.01-1 mcg/kg/min, Last Rate: 0.09 mcg/kg/min (07/24/24 0600)  PrismaSol 4/2.5, 25 mL/kg/hr, Last Rate: 25 mL/kg/hr (07/23/24 1745)      PRN medications  PRN medications: acetaminophen **OR** acetaminophen **OR** acetaminophen, dextrose, dextrose, glucagon, glucagon, heparin, lidocaine, vancomycin     Objective    Blood pressure (!) 89/49, pulse 92, temperature 36.9 °C (98.4 °F), temperature source Temporal, resp. rate 18, height 1.753 m (5' 9.02\"), weight 73.6 kg (162 lb 4.1 oz), SpO2 98%.     Physical Exam   Constitutional:       Appearance: Normal appearance. "   HENT:      Mouth/Throat:      Mouth: Mucous membranes are moist.   Cardiovascular:      Rate and Rhythm: Tachycardia present. Diastolic murmur on LSB and pulmonic area      Heart sounds: Normal heart sounds.   Pulmonary:      Effort: Pulmonary effort is normal.      Breath sounds: Normal breath sounds.   Abdominal:      Palpations: Abdomen is soft.   Musculoskeletal:      Comments: Left foot amputated above the knee  Right hand amputated above elbow  Posterior calcaneous wound has yellow discharge     Skin:     General: Skin is dry.   Neurological:      General: No focal deficit present.      Mental Status: He is oriented to person, place, and time.   Psychiatric:         Behavior: Behavior normal.         Thought Content: Thought content normal.         Judgment: Judgment normal.           Intake/Output Summary (Last 24 hours) at 7/24/2024 0855  Last data filed at 7/24/2024 0600  Gross per 24 hour   Intake 877.22 ml   Output --   Net 877.22 ml     Labs:   Results from last 72 hours   Lab Units 07/24/24  0339 07/23/24  1809 07/23/24  0952 07/23/24  0518   SODIUM mmol/L 129*  129*  --  126* 126*   POTASSIUM mmol/L 4.2  4.2  --  3.3* 3.7   CHLORIDE mmol/L 95*  95*  --  89* 92*   CO2 mmol/L 23  23  --  23 21   BUN mg/dL 39*  39* 47* 46* 43*   CREATININE mg/dL 5.50*  5.50* 6.99* 6.99* 6.13*   GLUCOSE mg/dL 59*  --  300* 294*   CALCIUM mg/dL 8.2*  --  8.4* 7.9*   ANION GAP mmol/L 15  15  --  17 17   EGFR mL/min/1.73m*2 13*  13* 9* 9* 11*   PHOSPHORUS mg/dL 3.2  --  3.5 3.0      Results from last 72 hours   Lab Units 07/24/24  0339 07/23/24  0518 07/22/24  2224 07/22/24  1045   WBC AUTO x10*3/uL 13.8* 16.3* 16.8* 20.0*   HEMOGLOBIN g/dL 7.5* 7.6* 7.4* 7.3*   HEMATOCRIT % 21.1* 20.3* 20.6* 19.9*   PLATELETS AUTO x10*3/uL 27* 50* 59* 65*   NEUTROS PCT AUTO %  --  86.0 85.2 86.3   LYMPHO PCT MAN % 7.8  --   --   --    LYMPHS PCT AUTO %  --  5.4 6.1 5.6   MONO PCT MAN % 1.7  --   --   --    MONOS PCT AUTO %  --  7.0  6.7 6.5   EOSINO PCT MAN % 0.0  --   --   --    EOS PCT AUTO %  --  0.2 0.2 0.1        Micro/ID:     Lab Results   Component Value Date    BLOODCULT Staphylococcus aureus (AA) 07/22/2024    BLOODCULT Staphylococcus aureus (AA) 07/22/2024       Summary of key imaging results from the last 24 hours  Vascular US LE Duplex 7/24/24:  Right Lower Venous: Unable to obtain right groin compression or visualize the DEIV, CFV, profunda and proximal FVs due to line placement. Can not rule out thrombus in non-visualized areas. Remainder visualized vessels show no evidence of DVT.  Left Lower Venous: There are chronic changes visualized in the common femoral and proximal femoral veins.    Assessment and Plan     Assessment: Edwar Hemphill is a 39 y.o. male with PMHx significant for ESRD (MWF  via right femoral line), NICM,  HFrEF (LVEF 35-40% on 5/2024 POLI), anemia, DM2, Left AKA, RUE amputation, HTN, PVD, GERD.  Admitted  with septic shock required pressors and infective endocarditis with large TV vegetation, w/ bcx growing GPCs with staph aureus ( with history MRSA bacteremia before). Started on vanc/zosyn, fluid resuscitated, ID, cardiology and vascular medicine consulted. On TTE showed TV vegetation and possible source could be femoral catheter with attached vegetations, continued on CVVH based on nephrology consult. Changed zoycin to ceftaroline on 7/23. We are not able to take off the line as a source of infection given his DVTs in subclavian, LIJ, b/l innominate occlusion and complete occlusion of intrahepatic IVC its hard to find another access at this time. IR, cardiology, ID, Vascular medicine are on board. he also had  Acinetobacter baumanni positive on his wound culture.       Mechanical Ventilation: none  Sedation/Analgesia:  none  Restraints: no    Summary for 07/24/24  :  IR consulted for possible second plan for line access   Cardiology recommended plt transfusion before POLI, with plt>50   Patient is receiving 2  units plts   Vascular medicine recommended on anticoagulation   IR recommended on therapeutic anticoagulation   Patient has low plt ~27, PF4 activity and serotonin assay send   Ceftaroline started on 7/23 and Vancomycin started on 7/22 and plan to continue with repeat BC   Patient had wrist pain yesterday, plan to obtain BC if possible    Plan:  NEUROLOGY/PSYCH:  #hx of leaving AMA multiple times     CARDIOVASCULAR:  #septic shock  #infective endocarditis  - s/p 3L IVF  - on levo running peripherally, increased to 0.09 yesterday, now on 0.08    - was on ome midodrine 10mg QID switched to 15MG TID  -TTE showed large vegetation on TV, cardiology is on board   - 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   -positive staph aureus bacteremia   - Ceftaroline started on 7/23 and Vancomycin started on 7/22     Plan:  -continue on levo   -POLI today   -c/w ceftaroline and vancomycin   -repeat BC today   -c/w home midodrine        #HFrecEF   - last EF 39% (lowest EF 26%)   - Presumed ICM          # hx of paroxysmal Afib  #Multiple line-related DVTs   - CHADSVASC 6 (DM, PAD, TIA, HTN, HF)   - b/l subclavian DVT, L IJ DVT, b/l innominate occlusion, complete occlusion of intrahepatic IVC s/p angioplasty   - priorly on Eliquis however recent GI bleed 6/15 at [facility] , eliquis was held and patient was planned for heparin trial but left ama prior to trial   - not a candidate for IVC filter given femoral TDC   - last DVT US w/ resolution of RLE DVT   -discontinued heparin due to low plts and concerned for HIT    Plan:  -FU with PF4 activity and serotonin assay to plan on anticoagulation therapy         PULMONARY:  NAVI  CXR at      RENAL/GENITOURINARY:  #ESRD MWF thru right femoral line   -  iHD TTS since 2016, anuric   - RUE AVG c/b infection s/p RUE amputation above elbow 2021   - multiple TDCs (including iliolumbar)   - femoral TDC recently placed at  (14.5 Telugu and 42 cm long)  - lokelma 5 MWF Held iso of  hypokalemia  - home sevelmer held  Plan   -FU with nephrology in terms of CVVH plan   -continue on CVVH now   -consulted IR in terms of line access        GASTROENTEROLOGY:  #Esophagitis   #GERD  ::EGD on 6/8 clipped two foci of active esophageal bleeding   Plan:  -cw home PPI       ENDOCRINOLOGY:  #hypothyroidism  -last TSH 1.9  Plan:  -cw home levo 125     #T1DM  -home regiment : 8u glargine, SSI  -Hba1c 7.3  Plan:   -home regiment insulin 8u glargine  -ordered 4 unites glargine and SSI #2     HEMATOLOGY:  #Chronic anemia  #Anemia of chronic disease  -no recent drop in Hb  Plan:  - c/w home epo m/w/f  -trend cbc     #Thrombocytopenia  -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 But developed lung toxicity 2/2 dapto. And switched to linezolid.  Plan:  - POLI today   -after find a second line can consider removing femoral line as a source of inf  -FU with ID             ICU Check List      FEN:  Fluids: restricted   Electrolytes: k>4 mg>2 caution as ESRD  Nutrition: renal diet  DVT ppx: none  GI ppx: PPI  Bowel care: Miralax prn  Access:  PIV, Femoral line        Social:  Code: Full Code    NOK:   Extended Emergency Contact Information  Primary Emergency Contact: Shanthi Hemphill  Mobile Phone: [phone]  Relation: Parent  Secondary Emergency Contact: Terra Scott  Mobile Phone: [phone]    Tucker Irwin MD   07/24/24 at 8:55 AM     Disclaimer: Documentation completed with the information available at the time of input. The times in the chart may not be reflective of actual patient care times,  interventions, or procedures. Documentation occurs after the physical care of the patient.

## 2024-07-24 NOTE — PROGRESS NOTES
Cardiology  Progress Note     Subjective/ Interval Events   Patient doing well, denies any acute cardiopulmonary symptoms.   Platelets this morning dropped to 27, pending platelet transfusion prior to POLI       Objective      7/24/2024     5:00 AM 7/24/2024     6:00 AM 7/24/2024     7:00 AM 7/24/2024     8:00 AM 7/24/2024     9:00 AM 7/24/2024    10:00 AM 7/24/2024    11:00 AM   Vitals   Systolic 86 109 89 89 95 97 112   Diastolic 58 71 52 49 56 58 69   Heart Rate 86 96 91 92 93 95 94   Temp   36.9 °C (98.4 °F)       Resp 13 16 0 18 14 20 22       Scheduled medications   Medication Dose Route Frequency    alteplase  2 mg intra-catheter Once    alteplase  2 mg intra-catheter Once    atorvastatin  40 mg oral Nightly    B complex-vitamin C-folic acid  1 capsule oral Daily    ceftaroline  400 mg intravenous q8h    cholecalciferol  50,000 Units oral Once per day on Monday Thursday    epoetin tyson or biosimilar  10,000 Units subcutaneous Once per day on Monday Wednesday Friday    folic acid  1 mg oral Daily    heparin        insulin glargine  2 Units subcutaneous Nightly    insulin lispro  0-5 Units subcutaneous q4h    levothyroxine  125 mcg oral Daily before breakfast    melatonin  6 mg oral Nightly    midodrine  10 mg oral q8h    nystatin  1 Application Topical BID    pantoprazole  40 mg oral Daily before breakfast    potassium chloride  20 mEq intravenous Once    [Held by provider] sevelamer carbonate  2,400 mg oral TID    thiamine  200 mg oral Daily    vancomycin  500 mg intravenous q12h        Physical Exam  Vitals reviewed.   Constitutional:       General: He is not in acute distress.  HENT:      Head: Normocephalic and atraumatic.   Eyes:      General: No scleral icterus.     Pupils: Pupils are equal, round, and reactive to light.   Cardiovascular:      Rate and Rhythm: Normal rate and regular rhythm.      Pulses: Normal pulses.      Heart sounds: Murmur heard.   Pulmonary:      Effort: Pulmonary effort is  normal. No respiratory distress.   Abdominal:      General: Abdomen is flat. There is no distension.   Skin:     General: Skin is warm.      Coloration: Skin is not jaundiced.   Neurological:      Mental Status: He is alert and oriented to person, place, and time.           My Interpretation of Reviewed Study(s):  TTE 7/2024  CONCLUSIONS:   1. Left ventricular ejection fraction is moderately decreased, calculated by Briseno's biplane at 35%.   2. There is global hypokinesis of the left ventricle with minor regional variations.   3. No left ventricular thrombus visualized.   4. There is reduced right ventricular systolic function.   5. Several small mobile echodensities noted within the RA likely attached to dialysis catheter and thickened TV with mobile echo densities associated with the TV concerning for possible vegetations with severe TR. Not well seen. Recommend POLI to further assess if clinically indicated.   6. There is moderate mitral annular calcification.   7. The tricuspid valve is abnormal.   8. Mildly elevated RVSP.   9. Severe tricuspid regurgitation visualized.  10. TV with likely mobile echodensities associated tih the leaflets though not well seen with severe TR. RVSP may be underestimated due to severity of TR.  11. There is reversed systolic hepatic vein flow.  12. Primary service notified of findings at the time of reporting.  13. Compared with the prior exam POLI from 5/14/2024 ( [facility]) the TV was previously normal with only trivial TR. The dialysis catheter seen within the RA is new since the prior POLI and the mobile echodensities and severe TR are new as well. The LV systolic function was already mild to moderately reduced at that time.  Prior ECGs (reviewed and my interpretation):   Encounter Date: 07/22/24   Electrocardiogram, 12-lead PRN ACS symptoms   Result Value    Ventricular Rate 97    Atrial Rate 97    TN Interval 122    QRS Duration 68    QT Interval 398    QTC Calculation(Bazett) 505     P Axis 39    R Axis -10    T Axis 61    QRS Count 16    Q Onset 223    P Onset 162    P Offset 194    T Offset 422    QTC Fredericia 466    Narrative    Normal sinus rhythm  Possible Anterior infarct , age undetermined  Abnormal ECG  When compared with ECG of 22-JUL-2024 11:06,  Borderline criteria for Anterior infarct are now Present  Nonspecific T wave abnormality no longer evident in Inferior leads        Assessment/Plan   Assessment/Plan   40 yo male with PMHx of ESRD (MWF via femoral line), HFrEF (LVEF 35-40% on 05/24 POLI), Anemia, DM2, and left AMA from [facility] last month, RUE amputation, HTN, PUD, GERD that presents from dialysis on 7/23 for hypotension and was found to be in septic shock with multiplet line-realted DVTs and infections.     #Septic Shock  #C/f Endocarditis with likely a recurrent line related infection  #Prolonged QT  Patient has had a history of MV endocarditis in 2020, however on TTE this admission it shows RV regurgitation and echodensities associated with the leaflets, however, not well seen with severe TR  - concern for MRSA, awaiting susceptibilities  - Continue abx management per ID  - Recommend POLI today after platelets >50   - Avoid QT prolonging agents  - Maintain K>4 and Mg>2  - agree with ID and CT Surgery consults     #Paroxysmal Afib  - currently in NSR   - per vascular medicine consult would recommend getting GI onboard prior to restarting AC           This patient has a central line  Reason for the central line remaining today? Dialysis/Hemapheresis  Full Code    I spent 30 minutes in the professional and overall care of this patient.      Thank you for the opportunity to contribute to the care of this patient. Above recommendations discussed with Dr. Latif  Recommendations are preliminary until attending signs.       Doris Ruiz MD   PGY5 Cardiology Fellow   Pager [phone]

## 2024-07-24 NOTE — PROGRESS NOTES
Edwar Hemphill is a 39 y.o. male on day 2 of admission presenting with Septic shock (Multi).    Subjective   Interval History: He reports severe pain in left wrist starting randomly yesterday afternoon. He denies any recent cuts/trauma to the wrist and hand, but has been turning around in bed with his arm. Pain exacerbated by pressure and movement. Previous left wrist septic arthritis with MRSA s/p I&D 9/2021.    Review of Systems   Constitutional:  Negative for chills and fever.   Eyes:  Negative for pain.   Respiratory:  Negative for shortness of breath.    Cardiovascular:  Negative for chest pain.   Gastrointestinal:  Negative for abdominal pain and diarrhea.   Neurological:  Negative for headaches.       Objective   Range of Vitals (last 24 hours)  Heart Rate:  []   Temp:  [35.4 °C (95.7 °F)-36.9 °C (98.4 °F)]   Resp:  [0-30]   BP: ()/(33-83)   Weight:  [73.6 kg (162 lb 4.1 oz)]   SpO2:  [97 %-100 %]   Daily Weight  07/23/24 : 73.6 kg (162 lb 4.1 oz)    Body mass index is 23.95 kg/m².    Physical Exam  Constitutional:       General: He is not in acute distress.  HENT:      Head: Normocephalic and atraumatic.   Eyes:      Pupils: Pupils are equal, round, and reactive to light.   Cardiovascular:      Rate and Rhythm: Normal rate and regular rhythm.   Pulmonary:      Effort: Pulmonary effort is normal. No respiratory distress.      Breath sounds: Normal breath sounds.   Abdominal:      General: Bowel sounds are normal. There is no distension.      Tenderness: There is no abdominal tenderness.   Musculoskeletal:      Right lower leg: Edema present.      Comments:  No obvious edema, warmth, skin cuts/trauma of left wrist and fingers     Neurological:      Mental Status: He is alert.           Antibiotics  ceftaroline (Teflaro) IV in 50 mL D5W  vancomycin - 500 mg/100 mL    Relevant Results  Labs  Results from last 72 hours   Lab Units 07/24/24  0339 07/23/24  0518 07/22/24  2224 07/22/24  1045   WBC AUTO  x10*3/uL 13.8* 16.3* 16.8* 20.0*   HEMOGLOBIN g/dL 7.5* 7.6* 7.4* 7.3*   HEMATOCRIT % 21.1* 20.3* 20.6* 19.9*   PLATELETS AUTO x10*3/uL 27* 50* 59* 65*   NEUTROS PCT AUTO %  --  86.0 85.2 86.3   LYMPHO PCT MAN % 7.8  --   --   --    LYMPHS PCT AUTO %  --  5.4 6.1 5.6   MONO PCT MAN % 1.7  --   --   --    MONOS PCT AUTO %  --  7.0 6.7 6.5   EOSINO PCT MAN % 0.0  --   --   --    EOS PCT AUTO %  --  0.2 0.2 0.1     Results from last 72 hours   Lab Units 07/24/24  0339 07/23/24  1809 07/23/24  0952 07/23/24  0518   SODIUM mmol/L 129*  129*  --  126* 126*   POTASSIUM mmol/L 4.2  4.2  --  3.3* 3.7   CHLORIDE mmol/L 95*  95*  --  89* 92*   CO2 mmol/L 23  23  --  23 21   BUN mg/dL 39*  39* 47* 46* 43*   CREATININE mg/dL 5.50*  5.50* 6.99* 6.99* 6.13*   GLUCOSE mg/dL 59*  --  300* 294*   CALCIUM mg/dL 8.2*  --  8.4* 7.9*   ANION GAP mmol/L 15  15  --  17 17   EGFR mL/min/1.73m*2 13*  13* 9* 9* 11*   PHOSPHORUS mg/dL 3.2  --  3.5 3.0     Results from last 72 hours   Lab Units 07/24/24  0339 07/23/24  0952 07/23/24  0518 07/22/24  2224   ALK PHOS U/L 101  --  107 115   BILIRUBIN TOTAL mg/dL 0.9  --  0.8 0.9   BILIRUBIN DIRECT mg/dL 0.2  --   --   --    PROTEIN TOTAL g/dL 5.4*  --  5.0* 5.1*   ALT U/L 4*  --  4* 4*   AST U/L 16  --  12 7*   ALBUMIN g/dL 2.0* 2.2* 1.9* 2.0*     Estimated Creatinine Clearance: 17.9 mL/min (A) (by C-G formula based on SCr of 5.5 mg/dL (H)).  CRP   Date Value Ref Range Status   07/24/2023 8.04 (A) mg/dL Final     Comment:     REF VALUE  < 1.00     07/24/2023 CANCELED       Comment:     Result canceled by the ancillary.   07/21/2023 11.83 (A) mg/dL Final     Comment:     REF VALUE  < 1.00       Microbiology  Susceptibility data from last 14 days.  Collected Specimen Info Organism   07/22/24 Blood culture from Peripheral Venipuncture Staphylococcus aureus   07/22/24 Blood culture from Peripheral Venipuncture Staphylococcus aureus     Imaging  ECG 12 lead    Result Date: 7/24/2024  Normal  sinus rhythm Prolonged QT Abnormal ECG When compared with ECG of 22-JUL-2024 10:44, No significant change was found See ED provider note for full interpretation and clinical correlation Confirmed by Kelsie Cherry ([id number]) on 7/24/2024 5:46:20 AM    Transthoracic Echo (TTE) Limited    Result Date: 7/23/2024   [facility], [address] [place] [address]                Tel [phone] and Fax [phone] TRANSTHORACIC ECHOCARDIOGRAM REPORT  Patient Name:      XIOMARA Maya Physician:    [id number] Penny Shaw MD Study Date:        7/23/2024            Ordering Provider:    [id number] DENISHA SONG MRN/PID:           [id number]             Fellow: Accession#:        [id number]         Nurse: Date of Birth/Age: 1985 / 39 years Sonographer:          Reji Bhakta RDCS Gender:            M                    Additional Staff: Height:            175.26 cm            Admit Date:           7/22/2024 Weight:            73.48 kg             Admission Status:     Inpatient -                                                               Routine BSA / BMI:         1.89 m2 / 23.92      Encounter#:           [id number]                    kg/m2 Blood Pressure:    94/44 mmHg           Department Location:  [address] Study Type:    TRANSTHORACIC ECHO (TTE) LIMITED Diagnosis/ICD: Sepsis, unspecified organism-A41.9 Indication:    concern for endocarditis CPT Code:      Echo Limited-29839; Doppler Limited-72063; Color Doppler-87009 Patient History: Pertinent History: HTN, HLD, septic shock, SIRS, tachycardia, PVVD, ESRD, NICM,                    HFrEF. Study Detail: The following Echo studies were performed: M-Mode, 2D, Doppler and               color flow. Technically challenging study due to prominent lung                artifact, body habitus, patient lying in supine position and poor               acoustic windows. Definity used as a contrast agent for               endocardial border definition. Total contrast used for this               procedure was 4.0 mL via IV push.  Critical Event Critical Event: Test was completed as per department protocol. Critical Finding: Possible Mass and or Endocarditis in right atrium/tricuspid valve. Time Test was Completed: 9:49:00 AM Notified: Dr. Shaw. Attending notification time: 9:55:00 AM  PHYSICIAN INTERPRETATION: Left Ventricle: Left ventricular ejection fraction is moderately decreased, calculated by Briseno's biplane at 35%. There is global hypokinesis of the left ventricle with minor regional variations. The left ventricular cavity size is normal. Left ventricular diastolic filling was not assessed. There is no definite left ventricular thrombus visualized. Left Atrium: The left atrium was not assessed. Right Ventricle: The right ventricle is normal in size. There is reduced right ventricular systolic function. Right Atrium: The right atrium was not well visualized. There is a device visualized in the right atrium. Several small mobile echodensities noted within the RA likely attached to dialysis catheter and thickened TV with mobile echo densities associated with the TV concerning for possible vegetations with severe TR. Not well seen. Recommend POLI to further assess if clinically indicated. Aortic Valve: The aortic valve is trileaflet. There is mild to moderate aortic valve cusp calcification. There is no evidence of aortic valve regurgitation. Mitral Valve: The mitral valve is mildly thickened. There is mild thickening and calcification of the anterior mitral valve leaflet. There is moderate mitral annular calcification. There is trace mitral valve regurgitation. Tricuspid Valve: The tricuspid valve is abnormal. There is severe tricuspid regurgitation. The Doppler  estimated RVSP is mildly elevated at 37.1 mmHg. There is reversed systolic hepatic vein flow. TV with likely mobile echodensities associated tih the leaflets though not well seen with severe TR. RVSP may be underestimated due to severity of TR. Pulmonic Valve: The pulmonic valve is structurally normal. There is physiologic pulmonic valve regurgitation. Pericardium: There is a trivial pericardial effusion. Aorta: The aortic root is normal. Systemic Veins: The inferior vena cava was not well visualized. In comparison to the previous echocardiogram(s): Compared with the prior exam POLI from 5/14/2024 ( [facility]) the TV was previously normal with only trivial TR. The dialysis catheter seen within the RA is new since the prior POLI and the mobile echodensities and severe TR are new as well. The LV systolic function was already mild to moderately reduced at that time.  CONCLUSIONS:  1. Left ventricular ejection fraction is moderately decreased, calculated by Briseno's biplane at 35%.  2. There is global hypokinesis of the left ventricle with minor regional variations.  3. No left ventricular thrombus visualized.  4. There is reduced right ventricular systolic function.  5. Several small mobile echodensities noted within the RA likely attached to dialysis catheter and thickened TV with mobile echo densities associated with the TV concerning for possible vegetations with severe TR. Not well seen. Recommend POLI to further assess if clinically indicated.  6. There is moderate mitral annular calcification.  7. The tricuspid valve is abnormal.  8. Mildly elevated RVSP.  9. Severe tricuspid regurgitation visualized. 10. TV with likely mobile echodensities associated tih the leaflets though not well seen with severe TR. RVSP may be underestimated due to severity of TR. 11. There is reversed systolic hepatic vein flow. 12. Primary service notified of findings at the time of reporting. 13. Compared with the prior exam POLI from 5/14/2024  ( [facility]) the TV was previously normal with only trivial TR. The dialysis catheter seen within the RA is new since the prior POLI and the mobile echodensities and severe TR are new as well. The LV systolic function was already mild to moderately reduced at that time. QUANTITATIVE DATA SUMMARY: 2D MEASUREMENTS:                          Normal Ranges: IVSd:          0.80 cm   (0.6-1.1cm) LVPWd:         0.75 cm   (0.6-1.1cm) LVIDd:         3.95 cm   (3.9-5.9cm) LVIDs:         3.25 cm LV Mass Index: 51.5 g/m2 LV % FS        17.7 % AORTA MEASUREMENTS:                      Normal Ranges: Ao Sinus, d: 3.20 cm (2.1-3.5cm) LV SYSTOLIC FUNCTION BY 2D PLANIMETRY (MOD):                      Normal Ranges: EF-A4C View:    40 % (>=55%) EF-A2C View:    29 % EF-Biplane:     35 % LV EF Reported: 35 %  RIGHT VENTRICLE: TAPSE: 18.9 mm RV s'  0.12 m/s TRICUSPID VALVE/RVSP:                             Normal Ranges: Peak TR Velocity: 2.92 m/s RV Syst Pressure: 37.1 mmHg (< 30mmHg)  [id number] Penny Shaw MD Electronically signed on 7/23/2024 at 10:49:40 AM  ** Final **     Point of Care Ultrasound    Result Date: 7/22/2024  Bridger Olguin DO     7/22/2024  7:19 PM Performed by: Bridger Olguin DO Authorized by: Bridger Olguin DO  Procedure: Cardiac Ultrasound Findings:  Views: parasternal long, parasternal short, apical four and subxiphoid The pericardial space was visualized and was NEGATIVE for a significant pericardial effusion. Activity: Ventricular contractions were visualized. LV: LV systolic function was DECREASED. Impression: Cardiac: The focused cardiac ultrasound exam had ABNORMAL findings as specified. Comments: Patient IVC extremely collapsible.      XR foot right 3+ views    Result Date: 7/22/2024  Interpreted By:  Yeni Mantilla, STUDY: Right ankle, 3 views. Right foot, 3 views.   INDICATION: Signs/Symptoms:osteo concern chronic wound; Signs/Symptoms:c/f osteomyelitis.   COMPARISON: None.   ACCESSION NUMBER(S): [id number];  [id number]   ORDERING CLINICIAN: ANDRÉS PEARSON   STUDY: Right ankle/foot: There is severe demineralization of the bones which limits evaluation for subtle fractures. Questionable age-indeterminate fractures of the 2nd, 3rd, and 4th metatarsal necks with slight impacted appearance and irregularity. Severe vascular calcifications of the ankle and foot. There is ulceration in the posterior aspect of the calcaneus. No osseous erosion, cortical indistinctness, regional osteopenia, or periosteal reaction to suggest radiographic findings of osteomyelitis. Ankle mortise is normally aligned.       Ulceration in the posterior aspect of the calcaneus without radiographic findings of osteomyelitis.   Severe demineralization of the bones which limits evaluation for subtle fractures.   Questionable age-indeterminate 2nd, 3rd, and 4th metatarsal neck fractures which may be however artifactual. Correlate with history for trauma and point tenderness.   MACRO: None.   Signed by: Yeni Mantilla 7/22/2024 2:54 PM Dictation workstation:   [id number]    XR ankle right 3+ views    Result Date: 7/22/2024  Interpreted By:  Yeni Mantilla, STUDY: Right ankle, 3 views. Right foot, 3 views.   INDICATION: Signs/Symptoms:osteo concern chronic wound; Signs/Symptoms:c/f osteomyelitis.   COMPARISON: None.   ACCESSION NUMBER(S): [id number]; [id number]   ORDERING CLINICIAN: ANDRÉS PEARSON   STUDY: Right ankle/foot: There is severe demineralization of the bones which limits evaluation for subtle fractures. Questionable age-indeterminate fractures of the 2nd, 3rd, and 4th metatarsal necks with slight impacted appearance and irregularity. Severe vascular calcifications of the ankle and foot. There is ulceration in the posterior aspect of the calcaneus. No osseous erosion, cortical indistinctness, regional osteopenia, or periosteal reaction to suggest radiographic findings of osteomyelitis. Ankle mortise is normally aligned.        Ulceration in the posterior aspect of the calcaneus without radiographic findings of osteomyelitis.   Severe demineralization of the bones which limits evaluation for subtle fractures.   Questionable age-indeterminate 2nd, 3rd, and 4th metatarsal neck fractures which may be however artifactual. Correlate with history for trauma and point tenderness.   MACRO: None.   Signed by: Yeni Mantilla 7/22/2024 2:54 PM Dictation workstation:   [id number]    XR chest 1 view    Result Date: 7/22/2024  STUDY: Chest Radiograph;  7/22/2024 10:48AM INDICATION: Hypotension. COMPARISON: 5/28/2024 XR Chest. ACCESSION NUMBER(S): [id number] ORDERING CLINICIAN: LOUIE NORIEGA TECHNIQUE:  Frontal chest was obtained at 11:42 hours. FINDINGS: CARDIOMEDIASTINAL SILHOUETTE: Cardiomediastinal silhouette is normal in size and configuration.  LUNGS: Linear atelectasis in the left base.  Small hazy opacities in the lung bases similar compared to prior.  Trace pleural effusions.  ABDOMEN: No remarkable upper abdominal findings.  BONES: No acute osseous changes.    Linear atelectasis in the left base. Small hazy opacities in the lung bases similar compared to prior. Trace pleural effusions. Signed by Roe Ruiz MD        Assessment/Plan   Edwar Hemphill is a 39 y.o. male with PMH of ESRD on HD (MWF, right femoral line), HFrEF (EF 35-40% 5/2024), T2DM, and multiple hospitalizations for L AKA infection and MRSA bacteremia who presented from SNF with hypotension. Now with blood cultures growing Staph aureus, sensitivities pending. Likely MRSA given his previous bacteremia with MRSA. He is stabilized on levo 0.08 and midodrine 10 mg and admitted to ICU. No obvious focal findings yet. Pain in left buttocks without sacral ulcer/skin breakage or cellulitis per nurse. Given his right femoral dialysis line, suspicion for source is catheter-associated infection. TTE 7/23 with thickened TV and mobile densities associated with the TV with severe TR,  not seen previously (TTE 5/14/24 with trivial TR and normal TV). Pending sensitivities but with high chance of MRSA, suspicion for endocarditis, and right femoral cath, there may be difficulty clearing. We will empirically treat with Vancomycin and Ceftaroline for the Staph aureus.     Recommendations:  Follow up POLI for further evaluation of vegetations on TV  Continue Vancomycin and Ceftaroline  Follow up blood culture 7/22 and 7/23  Recommend evaluation of left wrist with MR. Previous history of left wrist septic arthritis with MRSA s/p I&D in 2021. Now with severe wrist pain per patient and current staph bacteremia .  Recommend removal of right femoral line if possible, ideally line holiday for at least 48-72 hours. Patient is known vasculopath with history of catheter removal/exchanges. Nephrology recommendations to initiate CVVH. Will defer to nephrology and primary team regarding line decision.      Pinky Robles MD

## 2024-07-24 NOTE — CONSULTS
Inpatient consult to Cardiothoracic Surgery  Consult performed by: Dilip aLzar PA-C  Consult ordered by: Dayton Young MD  Reason for consult: TV Ednocarditis        CARDIAC SURGERY CONSULT NOTE    HISTORY OF PRESENT ILLNESS    Edwar Hemphill is a 39 y.o. male withPMHx significant for ESRD (MWF  via right femoral line), NICM,  HFrEF (LVEF 35-40% on 5/2024 POLI), paroxysmal A-fib,  chronic anemia, DM2, Left AKA, RUE amputation, HTN, PVD, GERD, chronic smoker and multiple DVTs including b/l subclavian DVT, L IJ DVT, b/l innominate occlusion, complete occlusion of intrahepatic IVC s/p angioplasty on chronic anticoagulation that was held 4/2024 due to upper GI bleed.  Patient admitted to MICU for septic shock likely 2/2 right groin tunneled HD line with bacteremia with Staph aureus in blood. He had a TTE 7/23 which showed TV with likely mobile echo densities with severe TR that was reportedly confirmed by POLI on 7/24 (pending official read.). He remains in septic shock and there is concern for KALLIE with platelets as low as [id number]. Vascular surgery involved now in setting of infected tunneled HD line and need for alternative access as well as potential for surgical removal of tunneled line with concern for clot formation along length. 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         Cardiac/Pertinent Imaging  LHC: none noted  Echo: Awaiting official POLI read from 7/24    Subjective   Past Medical History:   Diagnosis Date    Chronic kidney disease     Diabetes mellitus (Multi)     ESRD (end stage renal disease) (Multi)     Essential (primary) hypertension 05/26/2017    HTN (hypertension), benign    GERD (gastroesophageal reflux disease)     Hyperlipidemia     Hypothyroidism      Past Surgical History:   Procedure Laterality Date    ARM AMPUTATION AT ELBOW Right 05/2021    AV FISTULA PLACEMENT W/ PTFE      CT AORTA AND BILATERAL ILIOFEMORAL RUNOFF ANGIOGRAM W AND/OR WO IV CONTRAST   02/09/2023    CT AORTA AND BILATERAL ILIOFEMORAL RUNOFF ANGIOGRAM W AND/OR WO IV CONTRAST 2/9/2023 [facility]    IR CVC EXCHANGE  11/13/2023    IR CVC EXCHANGE 11/13/2023 [facility]    IR CVC PICC  10/19/2023    IR CVC PICC    IR CVC PICC  2/28/2022    IR CVC PICC    LEG AMPUTATION THROUGH KNEE Left 07/08/2023    LEG AMPUTATION THROUGH LOWER TIBIA AND FIBULA Left 07/05/2023    TOE AMPUTATION Left 02/17/2023    left 4th    WOUND DEBRIDEMENT Left 07/26/2023    leg    WOUND DEBRIDEMENT Left 08/02/2023    multiple leg debridements     Social History     Tobacco Use    Smoking status: Every Day     Current packs/day: 0.25     Average packs/day: 0.3 packs/day for 15.0 years (3.8 ttl pk-yrs)     Types: Cigarettes    Smokeless tobacco: Never     Family History   Problem Relation Name Age of Onset    Other (DIABETES DUE TO UNDERLYING CONDITION WITH MICROALBUMINURIA) Father      Other (DIABETES DUE TO UNDERLYING CONDITION WITH MICROALBUMINURIA [Other]) Other AUNT     Other (DIABETES DUE TO UNDERLYING CONDITION WITH MICROALBUMINURIA [Other]) Other GP        Cashew nut and Daptomycin    Prior to Admission medications    Medication Sig Start Date End Date Taking? Authorizing Provider   midodrine (Proamatine) 5 mg tablet Take 1 tablet (5 mg) by mouth once daily on dialysis days for 15 doses. 5/25/24 7/22/24 Yes Mauricio Estrada MD   atorvastatin (Lipitor) 40 mg tablet Take 1 tablet (40 mg) by mouth once daily at bedtime.    Historical Provider, MD   B complex-vitamin C-folic acid (Nephrocaps) 1 mg capsule Take 1 capsule by mouth once daily.    Historical Provider, MD   cholecalciferol (Vitamin D-3) 50,000 unit capsule Take 1 capsule (50,000 Units) by mouth 2 times a week. On Monday and Thursday    Historical Provider, MD   epoetin tyson-epbx (Retacrit) 10,000 unit/mL injection Inject 1 mL (10,000 Units) under the skin 3 times a week. 1/22/24 1/21/25  AMARILYS Carmichael-CNP   folic acid (Folvite) 1 mg tablet Take 1  tablet (1 mg) by mouth once daily.    Historical Provider, MD   insulin glargine (Lantus U-100 Insulin) 100 unit/mL injection Inject 8 Units under the skin once daily at bedtime. Take as directed per insulin instructions.    Historical Provider, MD   insulin lispro (HumaLOG) 100 unit/mL injection Inject 0-0.15 mL (0-15 Units) under the skin 3 times a day with meals. Take as directed per insulin instructions.  Patient taking differently: Inject 2-10 Units under the skin 3 times daily (morning, midday, late afternoon). If 151-200= 2 units  201-250= 4 units  251-300= 6 units   301-350= 8 units  351-400= 10 units  >400 notify provider 1/22/24 1/21/25  AMARILYS Carmichael-CNP   Lactobacillus acidophilus 1 billion cell tablet Take 1 tablet by mouth 2 times a day.    Historical Provider, MD   levothyroxine (Synthroid, Levoxyl) 125 mcg tablet Take 1 tablet (125 mcg) by mouth once daily in the morning. Take before meals.    Historical Provider, MD   loperamide (Imodium) 2 mg capsule Take 2 capsules (4 mg) by mouth every 2 hours if needed for diarrhea (FOR LOOSE STOLLS. 2 TABLETS AFTER EVERY EPISODE. DO NOT EXCEED 16MG TOTAL IN 24 HOURS.).    Historical Provider, MD   melatonin 3 mg tablet Take 2 tablets (6 mg) by mouth once daily at bedtime.    Historical Provider, MD   midodrine (Proamatine) 10 mg tablet Take 1 tablet (10 mg) by mouth every 4 hours if needed (hypotension).    Historical Provider, MD   oxyCODONE-acetaminophen (Percocet) 5-325 mg tablet Take 1 tablet by mouth every 6 hours if needed for severe pain (7 - 10). 11/16/23   AMARILYS Carmichael-CNP   pantoprazole (ProtoNix) 40 mg EC tablet Take 1 tablet (40 mg) by mouth once daily in the morning. Take before meals. Do not crush, chew, or split. Do not start before November 17, 2023. 11/17/23 11/16/24  AMARILYS Carmichael-CNP   sevelamer carbonate (Renvela) 800 mg tablet Take 3 tablets (2,400 mg) by mouth 3 times daily (morning, midday, late afternoon).  "3/25/22   Historical Provider, MD   sodium zirconium cyclosilicate (Lokelma) 5 gram packet Take 5 g by mouth 3 times a week. On Tuesday, Thursday and Saturday    Historical Provider, MD   thiamine 100 mg tablet Take 2 tablets (200 mg) by mouth once daily.    Historical Provider, MD   acetaminophen (Tylenol) 325 mg tablet Take 2 tablets (650 mg) by mouth every 4 hours if needed for moderate pain (4 - 6). 11/16/23 7/22/24  Zully Torres APRN-CNP   insulin glargine (Lantus) 100 unit/mL injection Inject 2 Units under the skin once daily at bedtime. Take as directed per insulin instructions. 5/25/24 7/22/24  Mauricio Estrada MD   nitroglycerin (Nitrostat) 0.4 mg SL tablet Place 1 tablet (0.4 mg) under the tongue every 5 minutes if needed for chest pain. 11/11/22 7/22/24  Historical Provider, MD   simethicone (Mylicon) 80 mg chewable tablet Chew 1 tablet (80 mg) every 8 hours if needed for flatulence.  7/22/24  Historical Provider, MD       Review of Systems  Review of Systems        Objective   /76   Pulse 92   Temp 36.8 °C (98.2 °F) (Temporal)   Resp 18   Ht 1.753 m (5' 9.02\")   Wt 73.6 kg (162 lb 4.1 oz)   SpO2 99%   BMI 23.95 kg/m²   0-10 (Numeric) Pain Score: 10 - Worst possible pain   Vitals:    07/23/24 2000   Weight: 73.6 kg (162 lb 4.1 oz)          Intake/Output Summary (Last 24 hours) at 7/24/2024 1820  Last data filed at 7/24/2024 1745  Gross per 24 hour   Intake 1574.01 ml   Output 34 ml   Net 1540.01 ml       Physical Exam  Physical Exam  Constitutional:       General: He is not in acute distress.     Appearance: He is not toxic-appearing.      Comments: tired   Eyes:      Pupils: Pupils are equal, round, and reactive to light.   Cardiovascular:      Rate and Rhythm: Tachycardia present.      Pulses: Normal pulses.   Pulmonary:      Effort: Pulmonary effort is normal. No respiratory distress.   Abdominal:      General: Abdomen is flat. There is no distension.      Palpations: Abdomen is soft. "      Tenderness: There is no abdominal tenderness.   Musculoskeletal:      Comments: Moving limbs weak   Skin:     General: Skin is warm and dry.   Neurological:      General: No focal deficit present.      Mental Status: He is alert and oriented to person, place, and time.   Psychiatric:      Comments: Seems somewhat depressed at this time         Medications  Scheduled medications  atorvastatin, 40 mg, oral, Nightly  B complex-vitamin C-folic acid, 1 capsule, oral, Daily  ceftaroline, 400 mg, intravenous, q8h  cholecalciferol, 50,000 Units, oral, Once per day on Monday Thursday  epoetin tyson or biosimilar, 10,000 Units, subcutaneous, Once per day on Monday Wednesday Friday  fentaNYL, 100 mcg, intravenous, Once  folic acid, 1 mg, oral, Daily  insulin glargine, 2 Units, subcutaneous, Nightly  insulin lispro, 0-5 Units, subcutaneous, q4h  levothyroxine, 125 mcg, oral, Daily before breakfast  melatonin, 6 mg, oral, Nightly  midazolam, 2 mg, intravenous, Once  midodrine, 10 mg, oral, q8h  nystatin, 1 Application, Topical, BID  pantoprazole, 40 mg, oral, Daily before breakfast  potassium chloride, 20 mEq, intravenous, Once  [Held by provider] sevelamer carbonate, 2,400 mg, oral, TID  thiamine, 200 mg, oral, Daily  vancomycin, 500 mg, intravenous, q12h    Continuous medications  norepinephrine, 0.01-1 mcg/kg/min, Last Rate: 0.06 mcg/kg/min (07/24/24 1600)  PrismaSol 4/2.5, 25 mL/kg/hr, Last Rate: 25 mL/kg/hr (07/24/24 1450)  vasopressin, 0.03 Units/min, Last Rate: 0.03 Units/min (07/24/24 1526)    PRN medications  PRN medications: acetaminophen **OR** acetaminophen **OR** acetaminophen, benzocaine, dextrose, dextrose, diphenhydrAMINE, fentaNYL, glucagon, glucagon, lidocaine, midazolam, vancomycin    Labs  Results for orders placed or performed during the hospital encounter of 07/22/24 (from the past 24 hour(s))   POCT GLUCOSE   Result Value Ref Range    POCT Glucose 122 (H) 74 - 99 mg/dL   POCT GLUCOSE   Result Value Ref  Range    POCT Glucose 81 74 - 99 mg/dL   CBC and Auto Differential   Result Value Ref Range    WBC 13.8 (H) 4.4 - 11.3 x10*3/uL    nRBC 0.0 0.0 - 0.0 /100 WBCs    RBC 2.63 (L) 4.50 - 5.90 x10*6/uL    Hemoglobin 7.5 (L) 13.5 - 17.5 g/dL    Hematocrit 21.1 (L) 41.0 - 52.0 %    MCV 80 80 - 100 fL    MCH 28.5 26.0 - 34.0 pg    MCHC 35.5 32.0 - 36.0 g/dL    RDW 17.3 (H) 11.5 - 14.5 %    Platelets 27 (LL) 150 - 450 x10*3/uL    Immature Granulocytes %, Automated 1.3 (H) 0.0 - 0.9 %    Immature Granulocytes Absolute, Automated 0.18 0.00 - 0.70 x10*3/uL   Comprehensive Metabolic Panel   Result Value Ref Range    Glucose 59 (L) 74 - 99 mg/dL    Sodium 129 (L) 136 - 145 mmol/L    Potassium 4.2 3.5 - 5.3 mmol/L    Chloride 95 (L) 98 - 107 mmol/L    Bicarbonate 23 21 - 32 mmol/L    Anion Gap 15 10 - 20 mmol/L    Urea Nitrogen 39 (H) 6 - 23 mg/dL    Creatinine 5.50 (H) 0.50 - 1.30 mg/dL    eGFR 13 (L) >60 mL/min/1.73m*2    Calcium 8.2 (L) 8.6 - 10.6 mg/dL    Albumin 2.0 (L) 3.4 - 5.0 g/dL    Alkaline Phosphatase 101 33 - 120 U/L    Total Protein 5.4 (L) 6.4 - 8.2 g/dL    AST 16 9 - 39 U/L    Bilirubin, Total 0.9 0.0 - 1.2 mg/dL    ALT 4 (L) 10 - 52 U/L   Magnesium   Result Value Ref Range    Magnesium 2.02 1.60 - 2.40 mg/dL   BUN   Result Value Ref Range    Urea Nitrogen 39 (H) 6 - 23 mg/dL   Creatinine, Serum   Result Value Ref Range    Creatinine 5.50 (H) 0.50 - 1.30 mg/dL    eGFR 13 (L) >60 mL/min/1.73m*2   Electrolyte panel   Result Value Ref Range    Sodium 129 (L) 136 - 145 mmol/L    Potassium 4.2 3.5 - 5.3 mmol/L    Chloride 95 (L) 98 - 107 mmol/L    Bicarbonate 23 21 - 32 mmol/L    Anion Gap 15 10 - 20 mmol/L   Calcium, ionized   Result Value Ref Range    POCT Calcium, Ionized 1.15 1.1 - 1.33 mmol/L   Phosphorus   Result Value Ref Range    Phosphorus 3.2 2.5 - 4.9 mg/dL   Bilirubin, Direct   Result Value Ref Range    Bilirubin, Direct 0.2 0.0 - 0.3 mg/dL   Manual Differential   Result Value Ref Range    Neutrophils %,  Manual 90.5 40.0 - 80.0 %    Lymphocytes %, Manual 7.8 13.0 - 44.0 %    Monocytes %, Manual 1.7 2.0 - 10.0 %    Eosinophils %, Manual 0.0 0.0 - 6.0 %    Basophils %, Manual 0.0 0.0 - 2.0 %    Seg Neutrophils Absolute, Manual 12.49 (H) 1.20 - 7.00 x10*3/uL    Lymphocytes Absolute, Manual 1.08 (L) 1.20 - 4.80 x10*3/uL    Monocytes Absolute, Manual 0.23 0.10 - 1.00 x10*3/uL    Eosinophils Absolute, Manual 0.00 0.00 - 0.70 x10*3/uL    Basophils Absolute, Manual 0.00 0.00 - 0.10 x10*3/uL    Total Cells Counted 116     RBC Morphology See Below     Target Cells Few    Coagulation Screen   Result Value Ref Range    Protime 15.4 (H) 9.8 - 12.8 seconds    INR 1.4 (H) 0.9 - 1.1    aPTT 34 27 - 38 seconds   POCT GLUCOSE   Result Value Ref Range    POCT Glucose 53 (L) 74 - 99 mg/dL   Vancomycin, Trough Please draw level before next scheduled vancomycin dose   Result Value Ref Range    Vancomycin, Trough 26.7 (HH) 5.0 - 20.0 ug/mL   POCT GLUCOSE   Result Value Ref Range    POCT Glucose 79 74 - 99 mg/dL   POCT GLUCOSE   Result Value Ref Range    POCT Glucose 123 (H) 74 - 99 mg/dL   Electrocardiogram, 12-lead PRN ACS symptoms   Result Value Ref Range    Ventricular Rate 97 BPM    Atrial Rate 97 BPM    WI Interval 122 ms    QRS Duration 68 ms    QT Interval 398 ms    QTC Calculation(Bazett) 505 ms    P Axis 39 degrees    R Axis -10 degrees    T Axis 61 degrees    QRS Count 16 beats    Q Onset 223 ms    P Onset 162 ms    P Offset 194 ms    T Offset 422 ms    QTC Fredericia 466 ms   POCT GLUCOSE   Result Value Ref Range    POCT Glucose 119 (H) 74 - 99 mg/dL   Prepare Platelets: 2 Units, Leukocytes Reduced (CMV reduced risk)   Result Value Ref Range    PRODUCT CODE [id number]     Unit Number [id number]     Unit ABO O     Unit RH POS     Dispense Status TR     Blood Expiration Date 7/25/2024 11:59:00 PM EDT     PRODUCT BLOOD TYPE 5100     UNIT VOLUME 417     PRODUCT CODE X0912F48     Unit Number [id number]     Unit ABO O      Unit RH POS     Dispense Status TR     Blood Expiration Date 7/24/2024 11:59:00 PM EDT     PRODUCT BLOOD TYPE 5100     UNIT VOLUME 227    CBC and Auto Differential   Result Value Ref Range    WBC 15.5 (H) 4.4 - 11.3 x10*3/uL    nRBC 0.0 0.0 - 0.0 /100 WBCs    RBC 2.32 (L) 4.50 - 5.90 x10*6/uL    Hemoglobin 6.6 (L) 13.5 - 17.5 g/dL    Hematocrit 18.3 (L) 41.0 - 52.0 %    MCV 79 (L) 80 - 100 fL    MCH 28.4 26.0 - 34.0 pg    MCHC 36.1 (H) 32.0 - 36.0 g/dL    RDW 17.3 (H) 11.5 - 14.5 %    Platelets 74 (L) 150 - 450 x10*3/uL    Neutrophils % 87.5 40.0 - 80.0 %    Immature Granulocytes %, Automated 1.5 (H) 0.0 - 0.9 %    Lymphocytes % 4.9 13.0 - 44.0 %    Monocytes % 5.5 2.0 - 10.0 %    Eosinophils % 0.3 0.0 - 6.0 %    Basophils % 0.3 0.0 - 2.0 %    Neutrophils Absolute 13.54 (H) 1.20 - 7.70 x10*3/uL    Immature Granulocytes Absolute, Automated 0.23 0.00 - 0.70 x10*3/uL    Lymphocytes Absolute 0.76 (L) 1.20 - 4.80 x10*3/uL    Monocytes Absolute 0.85 0.10 - 1.00 x10*3/uL    Eosinophils Absolute 0.04 0.00 - 0.70 x10*3/uL    Basophils Absolute 0.04 0.00 - 0.10 x10*3/uL   POCT GLUCOSE   Result Value Ref Range    POCT Glucose 132 (H) 74 - 99 mg/dL   Prepare RBC: 1 Units, Leukocytes Reduced (CMV reduced risk)   Result Value Ref Range    PRODUCT CODE [id number]     Unit Number [id number]     Unit ABO O     Unit RH POS     XM INTEP COMP     Dispense Status TR     Blood Expiration Date 8/19/2024 11:59:00 PM EDT     PRODUCT BLOOD TYPE 5100     UNIT VOLUME 282    Transesophageal Echo (POLI)   Result Value Ref Range    BSA 1.89 m2               ASSESSMENT & PLAN  Edwar Hemphill is a 39 y.o. male withPMHx significant for ESRD (MWF  via right femoral line), NICM,  HFrEF (LVEF 35-40% on 5/2024 POLI), paroxysmal A-fib,  chronic anemia, DM2, Left AKA, RUE amputation, HTN, PVD, GERD, chronic smoker and multiple DVTs including b/l subclavian DVT, L IJ DVT, b/l innominate occlusion, complete occlusion of intrahepatic IVC s/p angioplasty on  chronic anticoagulation that was held 4/2024 due to upper GI bleed.  Patient admitted to MICU for septic shock likely 2/2 right groin tunneled HD line. He had a TTE 7/23 which showed TV with likely mobile echodensities with severe TR that was reportedly confirmed by POLI on 7/24 (pending official read.). He remains in septic shock and there is concern for KALLIE with platelets as low as [id number]. Vascular surgery involved now in setting of infected tunneled HD line and need for alternative access as well as potential for surgical removal of tunneled line with concern for clot formation along length. 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 Jones aware of patient, currently reviewing case and available imaging.   Given patients critical illness and need for further optimization no indication for emergent surgery at this time    - Continue to treat septic shock per primary , ID  - Will follow vascular surgery recommendations for further access/lines  - PF4 and ABRIL pending    Will continue to follow along.  Thank you for the consultation.   Patient educated and all questions answered.  Please page the cardiac surgery consult pager [phone] with any questions or changes in patient condition.    Dilip Lazar PA-C  Cardiac Surgery Consult [facility]  [facility]  Cardiac Surgery Consult Pager [phone]     7/24/2024  6:20 PM

## 2024-07-24 NOTE — CARE PLAN
Problem: Skin  Goal: Decreased wound size/increased tissue granulation at next dressing change  Outcome: Not Progressing  Flowsheets (Taken 7/22/2024 2057 by Brynn Ortiz RN)  Decreased wound size/increased tissue granulation at next dressing change:   Protective dressings over bony prominences   Promote sleep for wound healing

## 2024-07-24 NOTE — PROGRESS NOTES
Xiomara Ferguson is a 39 y.o. male on day 2 of admission presenting with Septic shock (Multi).      Subjective   Xiomara Ferguson is a 39 y.o. male presenting with PMHx significant for ESRD (MWF  via right femoral line), NICM,  HFrEF (LVEF 35-40% on 5/2024 POLI), paroxysmal A-fib, [id number] 6 ,anemia, DM2, Left AKA, RUE amputation, HTN, PVD, GERD, chronic smoker.  Patient admitted to MICU for septic shock, bacteremia and endocarditis.  TTE showed TV vegetation and possible source could be femoral catheter.  Vascular medicine has been consulted for anticoagulation recommendations given history of recurrent VTE's.  He was previously treated with Eliquis however this was complicated by recent GI bleed in June 2024 he was then lost to follow-up.     Patient was seen and examined at bedside this morning.  Noted to have significant drop in platelet.  Platelet count down to 27.  Has not noted any acute bleed.  He was started on heparin SQ every 8 hours and since admission 7/22.  Heparin is now on hold  PF4 with reflex to ABRIL pending         Objective     Last Recorded Vitals  BP 97/58   Pulse 95   Temp 36.9 °C (98.4 °F) (Temporal)   Resp 20   Wt 73.6 kg (162 lb 4.1 oz)   SpO2 98%   Intake/Output last 3 Shifts:    Intake/Output Summary (Last 24 hours) at 7/24/2024 1134  Last data filed at 7/24/2024 1000  Gross per 24 hour   Intake 891.62 ml   Output --   Net 891.62 ml       Admission Weight  Weight: 68.5 kg (151 lb) (07/22/24 1031)    Daily Weight  07/23/24 : 73.6 kg (162 lb 4.1 oz)    Image Results  Vascular US Lower Extremity Venous Duplex Right  Preliminary Cardiology Report              [place] [id number]    Tel [phone] and Fax [phone]             Preliminary Vascular Lab Report     VASC US LOWER EXTREMITY VENOUS DUPLEX RIGHT       Patient Name:      XIOMARA FERGUSON Reading Physician:  [id number] Bridger Ervin MD  Study Date:        7/24/2024    Ordering Physician: [id number]  AEZEM SNYDER  MRN/PID:           [id number]     Technologist:       Antonietta COLLAZOT  Accession#:        [id number] Technologist 2:  Date of Birth/Age: 1985    Encounter#:         [id number]  Gender:            M  Admission Status:  Inpatient    Location Performed: [facility]       Diagnosis/ICD: Other specified soft tissue disorders-M79.89  Indication:    Limb swelling  Procedure/CPT: 26324 Peripheral venous duplex scan for DVT Limited       PRELIMINARY CONCLUSIONS:  Right Lower Venous: Unable to obtain right groin compression or visualize the DEIV, CFV, profunda and proximal FVs due to line placement. Can not rule out thrombus in non-visualized areas. Remainder visualized vessels show no evidence of DVT.  Left Lower Venous: There are chronic changes visualized in the common femoral and proximal femoral veins.     Imaging & Doppler Findings:     Right       Compressible Thrombus        Flow  FV Proximal     Yes        None   Spontaneous/Phasic  FV Mid          Yes        None  FV Distal       Yes        None  Popliteal       Yes        None   Spontaneous/Phasic  Peroneal        Yes        None  PTV             Yes        None       Left        Compress Thrombus  CFV         Partial  Chronic  FV Proximal Partial  Chronic    VASCULAR PRELIMINARY REPORT  completed by Antonietta Sierra RVT on 7/24/2024 at 10:44:49 AM       ** Final **  Electrocardiogram, 12-lead PRN ACS symptoms  Normal sinus rhythm  Possible Anterior infarct , age undetermined  Abnormal ECG  When compared with ECG of 22-JUL-2024 11:06,  Borderline criteria for Anterior infarct are now Present  Nonspecific T wave abnormality no longer evident in Inferior leads  ECG 12 lead  Normal sinus rhythm  Prolonged QT  Abnormal ECG  When compared with ECG of 22-JUL-2024 10:44,  No significant change was found  See ED provider note for full interpretation and clinical correlation  Confirmed by Kelsie Cherry ([id number]) on 7/24/2024 5:46:20  AM      Physical Exam    General appearance: alert and oriented, in no acute distress  Lungs: clear to auscultation bilaterally  Heart: regular rate and rhythm, S1, S2 normal, no murmur  Abdomen: soft, non-tender; bowel sounds normal;  Extremities: Right upper extremity amputation, left lower extremity AKA, chronic skin changes in right lower extremity, tunnel catheter in right thigh  Skin: Skin color, texture, turgor normal. No rashes or lesions  Neurologic: Grossly normal       Relevant Results  alteplase, 2 mg, intra-catheter, Once  alteplase, 2 mg, intra-catheter, Once  atorvastatin, 40 mg, oral, Nightly  B complex-vitamin C-folic acid, 1 capsule, oral, Daily  ceftaroline, 400 mg, intravenous, q8h  cholecalciferol, 50,000 Units, oral, Once per day on Monday Thursday  epoetin tyson or biosimilar, 10,000 Units, subcutaneous, Once per day on Monday Wednesday Friday  folic acid, 1 mg, oral, Daily  heparin, , ,   insulin glargine, 2 Units, subcutaneous, Nightly  insulin lispro, 0-5 Units, subcutaneous, q4h  levothyroxine, 125 mcg, oral, Daily before breakfast  melatonin, 6 mg, oral, Nightly  midodrine, 10 mg, oral, q8h  nystatin, 1 Application, Topical, BID  pantoprazole, 40 mg, oral, Daily before breakfast  potassium chloride, 20 mEq, intravenous, Once  [Held by provider] sevelamer carbonate, 2,400 mg, oral, TID  thiamine, 200 mg, oral, Daily  vancomycin, 500 mg, intravenous, q12h        norepinephrine, 0.01-1 mcg/kg/min, Last Rate: 0.09 mcg/kg/min (07/24/24 1106)  PrismaSol 4/2.5, 25 mL/kg/hr, Last Rate: 25 mL/kg/hr (07/23/24 1745)             Assessment/Plan      Edwar Hemphill is a 39 y.o. male presenting with PMHx significant for ESRD (MWF  via right femoral line), NICM,  HFrEF (LVEF 35-40% on 5/2024 POLI), paroxysmal A-fib, [id number] 6 ,anemia, DM2, Left AKA, RUE amputation, HTN, PVD, GERD, chronic smoker.  Patient admitted to MICU for septic shock, bacteremia and endocarditis.  TTE showed TV vegetation and  possible source could be femoral catheter.  Vascular medicine has been consulted for anticoagulation recommendations given history of recurrent VTE's.  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  His most recent common femoral VTE was in April 2024 (b/l subclavian DVT, L IJ DVT, b/l innominate occlusion, complete occlusion of intrahepatic IVC s/p angioplasty ).  He was also recently admitted to [facility] with upper GI bleed and anticoagulation was held at that time.   Given his [id number] score and history of recent VTE in April 2024 he would need an appropriate anticoagulation.  Please discuss with the GI to weigh in on when it would be appropriate to transition him to p.o. anticoagulation.  If he remains low risk for rebleeding from upper GI and once platelet counts improve then we can consider restarting him on Eliquis 5 mg twice daily which was his home dose.    On admission he was started on heparin subcu every 8 hours.  Noted to have significant drop in platelet counts today . ?concern for HIT.  Continue to closely monitor for any acute bleed.  PF4 with reflex to ABRIL pending.  Heparin on hold  Start bivalirudin  platelet transfusion not recommended in setting of HIT however primary team planning on POLI patient will be getting 2 units of platelet transfusion.     Discussed with  attending, Dr. Hernandez.           Monae Wang MD   Fellow

## 2024-07-25 ENCOUNTER — APPOINTMENT (OUTPATIENT)
Dept: RADIOLOGY | Facility: HOSPITAL | Age: 39
End: 2024-07-25
Payer: COMMERCIAL

## 2024-07-25 LAB
ALBUMIN SERPL BCP-MCNC: 2.2 G/DL (ref 3.4–5)
ALP SERPL-CCNC: 113 U/L (ref 33–120)
ALT SERPL W P-5'-P-CCNC: 3 U/L (ref 10–52)
ANION GAP BLDV CALCULATED.4IONS-SCNC: 13 MMOL/L (ref 10–25)
ANION GAP BLDV CALCULATED.4IONS-SCNC: 13 MMOL/L (ref 10–25)
ANION GAP SERPL CALC-SCNC: 12 MMOL/L (ref 10–20)
ANION GAP SERPL CALC-SCNC: 18 MMOL/L (ref 10–20)
AST SERPL W P-5'-P-CCNC: 8 U/L (ref 9–39)
ATRIAL RATE: 97 BPM
BACTERIA BLD AEROBE CULT: ABNORMAL
BACTERIA BLD AEROBE CULT: ABNORMAL
BACTERIA BLD CULT: ABNORMAL
BACTERIA BLD CULT: ABNORMAL
BASE EXCESS BLDV CALC-SCNC: -2.1 MMOL/L (ref -2–3)
BASE EXCESS BLDV CALC-SCNC: -3.4 MMOL/L (ref -2–3)
BASOPHILS # BLD AUTO: 0.04 X10*3/UL (ref 0–0.1)
BASOPHILS NFR BLD AUTO: 0.3 %
BILIRUB SERPL-MCNC: 1.1 MG/DL (ref 0–1.2)
BODY TEMPERATURE: 37 DEGREES CELSIUS
BODY TEMPERATURE: 37 DEGREES CELSIUS
BUN SERPL-MCNC: 23 MG/DL (ref 6–23)
BUN SERPL-MCNC: 28 MG/DL (ref 6–23)
CA-I BLD-SCNC: 1.14 MMOL/L (ref 1.1–1.33)
CA-I BLD-SCNC: 1.17 MMOL/L (ref 1.1–1.33)
CA-I BLDV-SCNC: 1.18 MMOL/L (ref 1.1–1.33)
CA-I BLDV-SCNC: 1.22 MMOL/L (ref 1.1–1.33)
CALCIUM SERPL-MCNC: 7.9 MG/DL (ref 8.6–10.6)
CHLORIDE BLDV-SCNC: 98 MMOL/L (ref 98–107)
CHLORIDE BLDV-SCNC: 99 MMOL/L (ref 98–107)
CHLORIDE SERPL-SCNC: 96 MMOL/L (ref 98–107)
CHLORIDE SERPL-SCNC: 98 MMOL/L (ref 98–107)
CO2 SERPL-SCNC: 21 MMOL/L (ref 21–32)
CO2 SERPL-SCNC: 25 MMOL/L (ref 21–32)
CREAT SERPL-MCNC: 3.32 MG/DL (ref 0.5–1.3)
CREAT SERPL-MCNC: 3.81 MG/DL (ref 0.5–1.3)
EGFRCR SERPLBLD CKD-EPI 2021: 20 ML/MIN/1.73M*2
EGFRCR SERPLBLD CKD-EPI 2021: 23 ML/MIN/1.73M*2
EOSINOPHIL # BLD AUTO: 0.09 X10*3/UL (ref 0–0.7)
EOSINOPHIL NFR BLD AUTO: 0.6 %
ERYTHROCYTE [DISTWIDTH] IN BLOOD BY AUTOMATED COUNT: 17.3 % (ref 11.5–14.5)
ERYTHROCYTE [DISTWIDTH] IN BLOOD BY AUTOMATED COUNT: 18.2 % (ref 11.5–14.5)
GLUCOSE BLD MANUAL STRIP-MCNC: 128 MG/DL (ref 74–99)
GLUCOSE BLD MANUAL STRIP-MCNC: 152 MG/DL (ref 74–99)
GLUCOSE BLD MANUAL STRIP-MCNC: 152 MG/DL (ref 74–99)
GLUCOSE BLD MANUAL STRIP-MCNC: 213 MG/DL (ref 74–99)
GLUCOSE BLD MANUAL STRIP-MCNC: 257 MG/DL (ref 74–99)
GLUCOSE BLDV-MCNC: 212 MG/DL (ref 74–99)
GLUCOSE BLDV-MCNC: 272 MG/DL (ref 74–99)
GLUCOSE SERPL-MCNC: 157 MG/DL (ref 74–99)
GRAM STN SPEC: ABNORMAL
HCO3 BLDV-SCNC: 21.3 MMOL/L (ref 22–26)
HCO3 BLDV-SCNC: 23.2 MMOL/L (ref 22–26)
HCT VFR BLD AUTO: 22 % (ref 41–52)
HCT VFR BLD AUTO: 23.6 % (ref 41–52)
HCT VFR BLD EST: 27 % (ref 41–52)
HCT VFR BLD EST: 28 % (ref 41–52)
HGB BLD-MCNC: 7.6 G/DL (ref 13.5–17.5)
HGB BLD-MCNC: 8.3 G/DL (ref 13.5–17.5)
HGB BLDV-MCNC: 8.9 G/DL (ref 13.5–17.5)
HGB BLDV-MCNC: 9.4 G/DL (ref 13.5–17.5)
IMM GRANULOCYTES # BLD AUTO: 0.3 X10*3/UL (ref 0–0.7)
IMM GRANULOCYTES NFR BLD AUTO: 2 % (ref 0–0.9)
INHALED O2 CONCENTRATION: 21 %
INHALED O2 CONCENTRATION: 21 %
INTERPRETATION FOR ANTI-PLATELET FACTOR 4 ANTIBODY: NEGATIVE
LACTATE BLDV-SCNC: 1.6 MMOL/L (ref 0.4–2)
LACTATE BLDV-SCNC: 1.8 MMOL/L (ref 0.4–2)
LYMPHOCYTES # BLD AUTO: 1.06 X10*3/UL (ref 1.2–4.8)
LYMPHOCYTES NFR BLD AUTO: 7.2 %
MAGNESIUM SERPL-MCNC: 1.95 MG/DL (ref 1.6–2.4)
MCH RBC QN AUTO: 28.5 PG (ref 26–34)
MCH RBC QN AUTO: 29.3 PG (ref 26–34)
MCHC RBC AUTO-ENTMCNC: 34.5 G/DL (ref 32–36)
MCHC RBC AUTO-ENTMCNC: 35.2 G/DL (ref 32–36)
MCV RBC AUTO: 82 FL (ref 80–100)
MCV RBC AUTO: 83 FL (ref 80–100)
MONOCYTES # BLD AUTO: 1.13 X10*3/UL (ref 0.1–1)
MONOCYTES NFR BLD AUTO: 7.7 %
NEUTROPHILS # BLD AUTO: 12.15 X10*3/UL (ref 1.2–7.7)
NEUTROPHILS NFR BLD AUTO: 82.2 %
NRBC BLD-RTO: 0 /100 WBCS (ref 0–0)
NRBC BLD-RTO: 0 /100 WBCS (ref 0–0)
OXYHGB MFR BLDV: 32.1 % (ref 45–75)
OXYHGB MFR BLDV: 59.2 % (ref 45–75)
P AXIS: 39 DEGREES
P OFFSET: 194 MS
P ONSET: 162 MS
PATH REVIEW-CBC DIFFERENTIAL: NORMAL
PCO2 BLDV: 36 MM HG (ref 41–51)
PCO2 BLDV: 41 MM HG (ref 41–51)
PH BLDV: 7.36 PH (ref 7.33–7.43)
PH BLDV: 7.38 PH (ref 7.33–7.43)
PHOSPHATE SERPL-MCNC: 3.1 MG/DL (ref 2.5–4.9)
PHOSPHATE SERPL-MCNC: 3.2 MG/DL (ref 2.5–4.9)
PLATELET # BLD AUTO: 53 X10*3/UL (ref 150–450)
PLATELET # BLD AUTO: 54 X10*3/UL (ref 150–450)
PO2 BLDV: 29 MM HG (ref 35–45)
PO2 BLDV: 42 MM HG (ref 35–45)
POTASSIUM BLDV-SCNC: 4.1 MMOL/L (ref 3.5–5.3)
POTASSIUM BLDV-SCNC: 4.4 MMOL/L (ref 3.5–5.3)
POTASSIUM SERPL-SCNC: 3.6 MMOL/L (ref 3.5–5.3)
POTASSIUM SERPL-SCNC: 4.1 MMOL/L (ref 3.5–5.3)
PR INTERVAL: 122 MS
PROT SERPL-MCNC: 5.5 G/DL (ref 6.4–8.2)
Q ONSET: 223 MS
QRS COUNT: 16 BEATS
QRS DURATION: 68 MS
QT INTERVAL: 398 MS
QTC CALCULATION(BAZETT): 505 MS
QTC FREDERICIA: 466 MS
R AXIS: -10 DEGREES
RBC # BLD AUTO: 2.67 X10*6/UL (ref 4.5–5.9)
RBC # BLD AUTO: 2.83 X10*6/UL (ref 4.5–5.9)
SAO2 % BLDV: 33 % (ref 45–75)
SAO2 % BLDV: 61 % (ref 45–75)
SERUM AND PLATELET FACTOR 4: 0.12 OD UNITS
SODIUM BLDV-SCNC: 129 MMOL/L (ref 136–145)
SODIUM BLDV-SCNC: 130 MMOL/L (ref 136–145)
SODIUM SERPL-SCNC: 131 MMOL/L (ref 136–145)
SODIUM SERPL-SCNC: 131 MMOL/L (ref 136–145)
T AXIS: 61 DEGREES
T OFFSET: 422 MS
VANCOMYCIN SERPL-MCNC: 30.7 UG/ML (ref 5–20)
VENTRICULAR RATE: 97 BPM
WBC # BLD AUTO: 14.8 X10*3/UL (ref 4.4–11.3)
WBC # BLD AUTO: 23.4 X10*3/UL (ref 4.4–11.3)

## 2024-07-25 PROCEDURE — 87040 BLOOD CULTURE FOR BACTERIA: CPT

## 2024-07-25 PROCEDURE — 84100 ASSAY OF PHOSPHORUS: CPT | Performed by: INTERNAL MEDICINE

## 2024-07-25 PROCEDURE — 83735 ASSAY OF MAGNESIUM: CPT

## 2024-07-25 PROCEDURE — 84132 ASSAY OF SERUM POTASSIUM: CPT

## 2024-07-25 PROCEDURE — 85027 COMPLETE CBC AUTOMATED: CPT

## 2024-07-25 PROCEDURE — 84075 ASSAY ALKALINE PHOSPHATASE: CPT

## 2024-07-25 PROCEDURE — 2500000001 HC RX 250 WO HCPCS SELF ADMINISTERED DRUGS (ALT 637 FOR MEDICARE OP): Performed by: INTERNAL MEDICINE

## 2024-07-25 PROCEDURE — 99223 1ST HOSP IP/OBS HIGH 75: CPT | Performed by: NURSE PRACTITIONER

## 2024-07-25 PROCEDURE — 99232 SBSQ HOSP IP/OBS MODERATE 35: CPT | Performed by: PHYSICIAN ASSISTANT

## 2024-07-25 PROCEDURE — 36415 COLL VENOUS BLD VENIPUNCTURE: CPT

## 2024-07-25 PROCEDURE — 2500000001 HC RX 250 WO HCPCS SELF ADMINISTERED DRUGS (ALT 637 FOR MEDICARE OP)

## 2024-07-25 PROCEDURE — 36415 COLL VENOUS BLD VENIPUNCTURE: CPT | Performed by: INTERNAL MEDICINE

## 2024-07-25 PROCEDURE — 99232 SBSQ HOSP IP/OBS MODERATE 35: CPT

## 2024-07-25 PROCEDURE — 2020000001 HC ICU ROOM DAILY

## 2024-07-25 PROCEDURE — 85025 COMPLETE CBC W/AUTO DIFF WBC: CPT

## 2024-07-25 PROCEDURE — 84100 ASSAY OF PHOSPHORUS: CPT

## 2024-07-25 PROCEDURE — 2500000002 HC RX 250 W HCPCS SELF ADMINISTERED DRUGS (ALT 637 FOR MEDICARE OP, ALT 636 FOR OP/ED)

## 2024-07-25 PROCEDURE — 74177 CT ABD & PELVIS W/CONTRAST: CPT

## 2024-07-25 PROCEDURE — 82330 ASSAY OF CALCIUM: CPT | Performed by: INTERNAL MEDICINE

## 2024-07-25 PROCEDURE — 80051 ELECTROLYTE PANEL: CPT | Performed by: INTERNAL MEDICINE

## 2024-07-25 PROCEDURE — 99291 CRITICAL CARE FIRST HOUR: CPT | Performed by: INTERNAL MEDICINE

## 2024-07-25 PROCEDURE — 82565 ASSAY OF CREATININE: CPT | Performed by: INTERNAL MEDICINE

## 2024-07-25 PROCEDURE — 2550000001 HC RX 255 CONTRASTS

## 2024-07-25 PROCEDURE — 2500000004 HC RX 250 GENERAL PHARMACY W/ HCPCS (ALT 636 FOR OP/ED): Mod: JZ

## 2024-07-25 PROCEDURE — 99222 1ST HOSP IP/OBS MODERATE 55: CPT

## 2024-07-25 PROCEDURE — 2500000004 HC RX 250 GENERAL PHARMACY W/ HCPCS (ALT 636 FOR OP/ED): Performed by: STUDENT IN AN ORGANIZED HEALTH CARE EDUCATION/TRAINING PROGRAM

## 2024-07-25 PROCEDURE — 2500000004 HC RX 250 GENERAL PHARMACY W/ HCPCS (ALT 636 FOR OP/ED)

## 2024-07-25 PROCEDURE — 82435 ASSAY OF BLOOD CHLORIDE: CPT | Performed by: INTERNAL MEDICINE

## 2024-07-25 PROCEDURE — 90937 HEMODIALYSIS REPEATED EVAL: CPT

## 2024-07-25 PROCEDURE — 84520 ASSAY OF UREA NITROGEN: CPT | Performed by: INTERNAL MEDICINE

## 2024-07-25 PROCEDURE — 87205 SMEAR GRAM STAIN: CPT

## 2024-07-25 PROCEDURE — 99291 CRITICAL CARE FIRST HOUR: CPT

## 2024-07-25 PROCEDURE — 80202 ASSAY OF VANCOMYCIN: CPT | Performed by: STUDENT IN AN ORGANIZED HEALTH CARE EDUCATION/TRAINING PROGRAM

## 2024-07-25 PROCEDURE — 82947 ASSAY GLUCOSE BLOOD QUANT: CPT

## 2024-07-25 PROCEDURE — 99233 SBSQ HOSP IP/OBS HIGH 50: CPT | Performed by: INTERNAL MEDICINE

## 2024-07-25 RX ORDER — NOREPINEPHRINE BITARTRATE/D5W 8 MG/250ML
0-.2 PLASTIC BAG, INJECTION (ML) INTRAVENOUS CONTINUOUS
Status: DISCONTINUED | OUTPATIENT
Start: 2024-07-25 | End: 2024-07-27

## 2024-07-25 ASSESSMENT — PAIN - FUNCTIONAL ASSESSMENT
PAIN_FUNCTIONAL_ASSESSMENT: 0-10

## 2024-07-25 ASSESSMENT — ENCOUNTER SYMPTOMS
CHILLS: 1
SHORTNESS OF BREATH: 0
ABDOMINAL PAIN: 0
DIARRHEA: 0
COUGH: 0
JOINT SWELLING: 0

## 2024-07-25 ASSESSMENT — PAIN SCALES - GENERAL
PAINLEVEL_OUTOF10: 0 - NO PAIN

## 2024-07-25 NOTE — PROGRESS NOTES
Edwar Hemphill is a 39 y.o. male on day 3 of admission presenting with Septic shock (Multi).    Subjective   Interval History: Patient reports feeling cold with chills throughout the night. No pain in wrist today. Denies any chest pain, sob, headache, abdominal pain, or diarrhea.    Review of Systems   Constitutional:  Positive for chills.   Respiratory:  Negative for cough and shortness of breath.    Cardiovascular:  Negative for chest pain.   Gastrointestinal:  Negative for abdominal pain and diarrhea.   Musculoskeletal:  Negative for joint swelling.        No wrist pain today       Objective   Range of Vitals (last 24 hours)  Heart Rate:  []   Temp:  [36.1 °C (97 °F)-37.8 °C (100 °F)]   Resp:  [0-30]   BP: ()/(42-93)   Weight:  [75.4 kg (166 lb 3.6 oz)]   SpO2:  [93 %-100 %]   Daily Weight  07/25/24 : 75.4 kg (166 lb 3.6 oz)    Body mass index is 24.54 kg/m².    Physical Exam  Constitutional:       General: He is not in acute distress.  Cardiovascular:      Rate and Rhythm: Normal rate and regular rhythm.   Pulmonary:      Effort: Pulmonary effort is normal. No respiratory distress.      Breath sounds: Normal breath sounds.   Abdominal:      General: Abdomen is flat. There is no distension.      Tenderness: There is no abdominal tenderness.   Musculoskeletal:      Right lower leg: Edema present.   Skin:     General: Skin is warm and dry.   Neurological:      Mental Status: He is alert.       Antibiotics  ceftaroline (Teflaro) IV in 50 mL D5W  vancomycin - 500 mg/100 mL    Relevant Results  Labs  Results from last 72 hours   Lab Units 07/25/24  0336 07/24/24  1522 07/24/24  0339 07/23/24  0518   WBC AUTO x10*3/uL 14.8* 15.5* 13.8* 16.3*   HEMOGLOBIN g/dL 7.6* 6.6* 7.5* 7.6*   HEMATOCRIT % 22.0* 18.3* 21.1* 20.3*   PLATELETS AUTO x10*3/uL 54* 74* 27* 50*   NEUTROS PCT AUTO % 82.2 87.5  --  86.0   LYMPHO PCT MAN %  --   --  7.8  --    LYMPHS PCT AUTO % 7.2 4.9  --  5.4   MONO PCT MAN %  --   --  1.7  --     MONOS PCT AUTO % 7.7 5.5  --  7.0   EOSINO PCT MAN %  --   --  0.0  --    EOS PCT AUTO % 0.6 0.3  --  0.2     Results from last 72 hours   Lab Units 07/25/24  0336 07/24/24  0339 07/23/24  1809 07/23/24  0952   SODIUM mmol/L 131* 129*  129*  --  126*   POTASSIUM mmol/L 3.6 4.2  4.2  --  3.3*   CHLORIDE mmol/L 98 95*  95*  --  89*   CO2 mmol/L 25 23 23  --  23   BUN mg/dL 28* 39*  39* 47* 46*   CREATININE mg/dL 3.81* 5.50*  5.50* 6.99* 6.99*   GLUCOSE mg/dL 157* 59*  --  300*   CALCIUM mg/dL 7.9* 8.2*  --  8.4*   ANION GAP mmol/L 12 15  15  --  17   EGFR mL/min/1.73m*2 20* 13*  13* 9* 9*   PHOSPHORUS mg/dL 3.1 3.2  --  3.5     Results from last 72 hours   Lab Units 07/25/24  0336 07/24/24  0339 07/23/24  0952 07/23/24  0518   ALK PHOS U/L 113 101  --  107   BILIRUBIN TOTAL mg/dL 1.1 0.9  --  0.8   BILIRUBIN DIRECT mg/dL  --  0.2  --   --    PROTEIN TOTAL g/dL 5.5* 5.4*  --  5.0*   ALT U/L 3* 4*  --  4*   AST U/L 8* 16  --  12   ALBUMIN g/dL 2.2* 2.0* 2.2* 1.9*     Estimated Creatinine Clearance: 25.8 mL/min (A) (by C-G formula based on SCr of 3.81 mg/dL (H)).  CRP   Date Value Ref Range Status   07/24/2023 8.04 (A) mg/dL Final     Comment:     REF VALUE  < 1.00     07/24/2023 CANCELED       Comment:     Result canceled by the ancillary.   07/21/2023 11.83 (A) mg/dL Final     Comment:     REF VALUE  < 1.00       Microbiology  Susceptibility data from last 14 days.  Collected Specimen Info Organism   07/22/24 Blood culture from Peripheral Venipuncture Staphylococcus aureus   07/22/24 Blood culture from Peripheral Venipuncture Staphylococcus aureus       Imaging  Transesophageal Echo (POLI)    Result Date: 7/24/2024   [facility], [address] [place] [address]                Tel [phone] and Fax [phone] TRANSESOPHAGEAL ECHOCARDIOGRAM REPORT  Patient Name:      XIOMARA Maya Physician:    [id number] Penny Shaw                                                                MD Study Date:        7/24/2024            Ordering Provider:    [id number] AZEEM SNYDER MRN/PID:           [id number]             Fellow:               [id number] Trey Hurtado MD Accession#:        [id number]         Nurse:                Radha Alaniz RN Date of Birth/Age: 1985 / 39 years Sonographer: Gender:            M                    Additional Staff: Height:            175.00 cm            Admit Date: Weight:            74.00 kg             Admission Status:     Inpatient -                                                               Routine BSA / BMI:         1.89 m2 / 24.16      Encounter#:           [id number]                    kg/m2 Blood Pressure:    123/88 mmHg          Department Location: Study Type:    TRANSESOPHAGEAL ECHO (POLI) Diagnosis/ICD: Acute and subacute endocarditis, unspecified-I33.9 Indication:    endocarditis CPT Code:      POLI Complete-05776; Doppler Limited-11342; Color Doppler-51060 Patient History: Allergies:         Daptomycin. Pertinent History: Renal Failure, HTN, Hyperlipidemia, Previous DVT, CHF and                    A-Fib. L AKA, R above elbow amputation. Study Detail: Agitated saline used as a contrast agent for intraseptal flow               evaluation.  PHYSICIAN INTERPRETATION: POLI Details: Technically adequate omniplane transesophageal echocardiogram performed. Agitated saline contrast echo was performed to assess for the presence of a patent foramen ovale. POLI Medication: The pharynx was anesthetized with lidocaine ointment and Topix spray. The patient was sedated using moderate sedation. Midazolam and Fentanyl was used to sedate the patient for this exam. POLI Procedure: The probe was passed without difficulty. Complications encountered during procedure: Patient tolerated the procedure well without any apparent complications. Left  Ventricle: Left ventricular ejection fraction is moderately decreased, by visual estimate at 40%. There is global hypokinesis of the left ventricle with minor regional variations. The left ventricular cavity size is normal. Left ventricular diastolic filling was not assessed. Left Atrium: The left atrium is normal in size. There is no definite left atrial thrombus present. There is a moderately sized patent foramen ovale. The patent foramen ovale was visualized using agitated saline contrast. A bubble study using agitated saline was performed. Bubble study is positive. A moderate PFO (11-20 bubbles) was demonstrated. There is no thrombus visualized in the left atrial appendage. Agitated saline contrast study was positive for an intracardiac shunt (moderate sized PFO). Right Ventricle: The right ventricle is mildly enlarged. There is moderately reduced right ventricular systolic function. Right Atrium: The right atrium is mildly dilated. There is a device visualized in the right atrium. There is a large vegetation that circumferentially encases the femoral TDC ( catheter). There is also involvement of the TV with likely destruction of the septal leaflet of the TV and resultant severe TR. ( the vegatation surrounding the catheter measures at least 1.9cm x 2.5cm. Aortic Valve: The aortic valve is trileaflet. There is no evidence of aortic valve regurgitation. Trielaflet AV with no paravalular abscess or AI, however there is a small mobile echodensity on the LVOT side of the AV with independent motion whcih could be consitent with a small vegetation vs degenerative changes. Mitral Valve: The mitral valve is normal in structure. There is no evidence of mitral valve regurgitation. Tricuspid Valve: The tricuspid valve is abnormal. There is severe tricuspid regurgitation. The tricuspid valve regurgitant jet is eccentrically directed. Please see RA comments. Pulmonic Valve: The pulmonic valve is structurally normal. There is  trace pulmonic valve regurgitation. Pericardium: There is no pericardial effusion noted. Aorta: The aortic root is normal. In comparison to the previous echocardiogram(s): Compared with study dated 7/23/2024, Catheter associated vegetation and anatomy of TV are better visualized on today's study. Otherwise, there are no gross changes from the previous surface echo.  CONCLUSIONS:  1. Left ventricular ejection fraction is moderately decreased, by visual estimate at 40%.  2. There is global hypokinesis of the left ventricle with minor regional variations.  3. Mildly enlarged right ventricle.  4. There is moderately reduced right ventricular systolic function.  5. Agitated saline contrast study was positive for an intracardiac shunt (moderate sized PFO).  6. There is a large vegetation that circumferentially encases the femoral TDC ( catheter). There is also involvement of the TV with likely destruction of the septal leaflet of the TV and resultant severe TR. ( the vegatation surrounding the catheter measures at least 1.9cm x 2.5cm.  7. The tricuspid valve is abnormal.  8. Severe tricuspid regurgitation visualized.  9. Trielaflet AV with no paravalular abscess or AI, however there is a small mobile echodensity on the LVOT side of the AV with independent motion whcih could be consitent with a small vegetation vs degenerative changes. 10. Moderately sized patent foramen ovale. 11. No left atrial thrombus. 12. MICU team nofitied of findings at the time of study. 13. A bubble study using agitated saline was performed. Bubble study is positive. A moderate PFO (11-20 bubbles) was demonstrated. 14. Compared with study dated 7/23/2024, Catheter associated vegetation and anatomy of TV are better visualized on today's study. Otherwise, there are no gross changes from the previous surface echo. QUANTITATIVE DATA SUMMARY: LV SYSTOLIC FUNCTION BY 2D PLANIMETRY (MOD):                      Normal Ranges: EF-Visual:      40 % LV EF  Reported: 40 %  [id number] Penny Shaw MD Electronically signed on 7/24/2024 at 6:31:34 PM  ** Final **     Vascular US Lower Extremity Venous Duplex Right    Result Date: 7/24/2024            [place] [address]   Tel [phone] and Fax [phone]  [facility] LOWER EXTREMITY VENOUS DUPLEX RIGHT  Patient Name:      XIOMARA Maya Physician:  [id number] Bridger Ervin MD Study Date:        7/24/2024            Ordering Physician: [id number] SNYDER MRN/PID:           [id number]             Technologist:       Antonietta Sierra RVT Accession#:        [id number]         Technologist 2: Date of Birth/Age: 1985 / 39 years Encounter#:         [id number] Gender:            M Admission Status:  Inpatient            Location Performed: [facility]  Diagnosis/ICD: Other specified soft tissue disorders-M79.89 Indication:    Limb swelling CPT Codes:     46559 Peripheral venous duplex scan for DVT Limited  CONCLUSIONS: Right Lower Venous: Unable to obtain right groin compression or visualize the DEIV, CFV, profunda and proximal FVs due to line placement. Can not rule out thrombus in non-visualized areas. Remainder visualized vessels show no evidence of DVT. Left Lower Venous: There are chronic changes visualized in the common femoral and proximal femoral veins.  Imaging & Doppler Findings:  Right       Compressible Thrombus        Flow FV Proximal     Yes        None   Spontaneous/Phasic FV Mid          Yes        None FV Distal       Yes        None Popliteal       Yes        None   Spontaneous/Phasic Peroneal        Yes        None PTV             Yes        None  Left        Compress Thrombus CFV         Partial  Chronic FV Proximal Partial  Chronic  [id number] Bridger Ervin MD Electronically signed by [id number] Bridger Ervin MD  on 7/24/2024 at 12:41:59 PM  ** Final **     Electrocardiogram, 12-lead PRN ACS symptoms    Result Date: 7/24/2024  Normal sinus rhythm Possible Anterior infarct , age undetermined Abnormal ECG When compared with ECG of 22-JUL-2024 11:06, Borderline criteria for Anterior infarct are now Present Nonspecific T wave abnormality no longer evident in Inferior leads    ECG 12 lead    Result Date: 7/24/2024  Normal sinus rhythm Prolonged QT Abnormal ECG When compared with ECG of 22-JUL-2024 10:44, No significant change was found See ED provider note for full interpretation and clinical correlation Confirmed by Kelsie Cherry ([id number]) on 7/24/2024 5:46:20 AM    Transthoracic Echo (TTE) Limited    Result Date: 7/23/2024   [facility], [address] [place] [address]                Tel [phone] and Fax [phone] TRANSTHORACIC ECHOCARDIOGRAM REPORT  Patient Name:      XIOMARA Maya Physician:    [id number] Penny Shaw MD Study Date:        7/23/2024            Ordering Provider:    [id number] DENISHA SONG MRN/PID:           [id number]             Fellow: Accession#:        [id number]         Nurse: Date of Birth/Age: 1985 / 39 years Sonographer:          Reji Bhakta RDCS Gender:            M                    Additional Staff: Height:            175.26 cm            Admit Date:           7/22/2024 Weight:            73.48 kg             Admission Status:     Inpatient -                                                               Routine BSA / BMI:         1.89 m2 / 23.92      Encounter#:           [id number]                    kg/m2 Blood Pressure:    94/44 mmHg           Department Location:  [address] Study Type:    TRANSTHORACIC ECHO (TTE) LIMITED Diagnosis/ICD: Sepsis, unspecified  organism-A41.9 Indication:    concern for endocarditis CPT Code:      Echo Limited-68646; Doppler Limited-96247; Color Doppler-45497 Patient History: Pertinent History: HTN, HLD, septic shock, SIRS, tachycardia, PVVD, ESRD, NICM,                    HFrEF. Study Detail: The following Echo studies were performed: M-Mode, 2D, Doppler and               color flow. Technically challenging study due to prominent lung               artifact, body habitus, patient lying in supine position and poor               acoustic windows. Definity used as a contrast agent for               endocardial border definition. Total contrast used for this               procedure was 4.0 mL via IV push.  Critical Event Critical Event: Test was completed as per department protocol. Critical Finding: Possible Mass and or Endocarditis in right atrium/tricuspid valve. Time Test was Completed: 9:49:00 AM Notified: Dr. Shaw. Attending notification time: 9:55:00 AM  PHYSICIAN INTERPRETATION: Left Ventricle: Left ventricular ejection fraction is moderately decreased, calculated by Briseno's biplane at 35%. There is global hypokinesis of the left ventricle with minor regional variations. The left ventricular cavity size is normal. Left ventricular diastolic filling was not assessed. There is no definite left ventricular thrombus visualized. Left Atrium: The left atrium was not assessed. Right Ventricle: The right ventricle is normal in size. There is reduced right ventricular systolic function. Right Atrium: The right atrium was not well visualized. There is a device visualized in the right atrium. Several small mobile echodensities noted within the RA likely attached to dialysis catheter and thickened TV with mobile echo densities associated with the TV concerning for possible vegetations with severe TR. Not well seen. Recommend POLI to further assess if clinically indicated. Aortic Valve: The aortic valve is trileaflet. There is mild to moderate  aortic valve cusp calcification. There is no evidence of aortic valve regurgitation. Mitral Valve: The mitral valve is mildly thickened. There is mild thickening and calcification of the anterior mitral valve leaflet. There is moderate mitral annular calcification. There is trace mitral valve regurgitation. Tricuspid Valve: The tricuspid valve is abnormal. There is severe tricuspid regurgitation. The Doppler estimated RVSP is mildly elevated at 37.1 mmHg. There is reversed systolic hepatic vein flow. TV with likely mobile echodensities associated tih the leaflets though not well seen with severe TR. RVSP may be underestimated due to severity of TR. Pulmonic Valve: The pulmonic valve is structurally normal. There is physiologic pulmonic valve regurgitation. Pericardium: There is a trivial pericardial effusion. Aorta: The aortic root is normal. Systemic Veins: The inferior vena cava was not well visualized. In comparison to the previous echocardiogram(s): Compared with the prior exam POLI from 5/14/2024 ( [facility]) the TV was previously normal with only trivial TR. The dialysis catheter seen within the RA is new since the prior POLI and the mobile echodensities and severe TR are new as well. The LV systolic function was already mild to moderately reduced at that time.  CONCLUSIONS:  1. Left ventricular ejection fraction is moderately decreased, calculated by Briseno's biplane at 35%.  2. There is global hypokinesis of the left ventricle with minor regional variations.  3. No left ventricular thrombus visualized.  4. There is reduced right ventricular systolic function.  5. Several small mobile echodensities noted within the RA likely attached to dialysis catheter and thickened TV with mobile echo densities associated with the TV concerning for possible vegetations with severe TR. Not well seen. Recommend POLI to further assess if clinically indicated.  6. There is moderate mitral annular calcification.  7. The tricuspid  valve is abnormal.  8. Mildly elevated RVSP.  9. Severe tricuspid regurgitation visualized. 10. TV with likely mobile echodensities associated tih the leaflets though not well seen with severe TR. RVSP may be underestimated due to severity of TR. 11. There is reversed systolic hepatic vein flow. 12. Primary service notified of findings at the time of reporting. 13. Compared with the prior exam POLI from 5/14/2024 ( [facility]) the TV was previously normal with only trivial TR. The dialysis catheter seen within the RA is new since the prior POLI and the mobile echodensities and severe TR are new as well. The LV systolic function was already mild to moderately reduced at that time. QUANTITATIVE DATA SUMMARY: 2D MEASUREMENTS:                          Normal Ranges: IVSd:          0.80 cm   (0.6-1.1cm) LVPWd:         0.75 cm   (0.6-1.1cm) LVIDd:         3.95 cm   (3.9-5.9cm) LVIDs:         3.25 cm LV Mass Index: 51.5 g/m2 LV % FS        17.7 % AORTA MEASUREMENTS:                      Normal Ranges: Ao Sinus, d: 3.20 cm (2.1-3.5cm) LV SYSTOLIC FUNCTION BY 2D PLANIMETRY (MOD):                      Normal Ranges: EF-A4C View:    40 % (>=55%) EF-A2C View:    29 % EF-Biplane:     35 % LV EF Reported: 35 %  RIGHT VENTRICLE: TAPSE: 18.9 mm RV s'  0.12 m/s TRICUSPID VALVE/RVSP:                             Normal Ranges: Peak TR Velocity: 2.92 m/s RV Syst Pressure: 37.1 mmHg (< 30mmHg)  [id number] Penny Shaw MD Electronically signed on 7/23/2024 at 10:49:40 AM  ** Final **     Point of Care Ultrasound    Result Date: 7/22/2024  Bridger Olguin DO     7/22/2024  7:19 PM Performed by: Bridger Olguin DO Authorized by: Bridger Olguin DO  Procedure: Cardiac Ultrasound Findings:  Views: parasternal long, parasternal short, apical four and subxiphoid The pericardial space was visualized and was NEGATIVE for a significant pericardial effusion. Activity: Ventricular contractions were visualized. LV: LV systolic function was DECREASED. Impression:  Cardiac: The focused cardiac ultrasound exam had ABNORMAL findings as specified. Comments: Patient IVC extremely collapsible.      XR foot right 3+ views    Result Date: 7/22/2024  Interpreted By:  Yeni Mantilla, STUDY: Right ankle, 3 views. Right foot, 3 views.   INDICATION: Signs/Symptoms:osteo concern chronic wound; Signs/Symptoms:c/f osteomyelitis.   COMPARISON: None.   ACCESSION NUMBER(S): [id number]; [id number]   ORDERING CLINICIAN: ANDRÉS PEARSON   STUDY: Right ankle/foot: There is severe demineralization of the bones which limits evaluation for subtle fractures. Questionable age-indeterminate fractures of the 2nd, 3rd, and 4th metatarsal necks with slight impacted appearance and irregularity. Severe vascular calcifications of the ankle and foot. There is ulceration in the posterior aspect of the calcaneus. No osseous erosion, cortical indistinctness, regional osteopenia, or periosteal reaction to suggest radiographic findings of osteomyelitis. Ankle mortise is normally aligned.       Ulceration in the posterior aspect of the calcaneus without radiographic findings of osteomyelitis.   Severe demineralization of the bones which limits evaluation for subtle fractures.   Questionable age-indeterminate 2nd, 3rd, and 4th metatarsal neck fractures which may be however artifactual. Correlate with history for trauma and point tenderness.   MACRO: None.   Signed by: Yeni Mantilla 7/22/2024 2:54 PM Dictation workstation:   [id number]    XR ankle right 3+ views    Result Date: 7/22/2024  Interpreted By:  Yeni Mantilla, STUDY: Right ankle, 3 views. Right foot, 3 views.   INDICATION: Signs/Symptoms:osteo concern chronic wound; Signs/Symptoms:c/f osteomyelitis.   COMPARISON: None.   ACCESSION NUMBER(S): [id number]; [id number]   ORDERING CLINICIAN: ANDRÉS PEARSON   STUDY: Right ankle/foot: There is severe demineralization of the bones which limits evaluation for subtle fractures. Questionable  age-indeterminate fractures of the 2nd, 3rd, and 4th metatarsal necks with slight impacted appearance and irregularity. Severe vascular calcifications of the ankle and foot. There is ulceration in the posterior aspect of the calcaneus. No osseous erosion, cortical indistinctness, regional osteopenia, or periosteal reaction to suggest radiographic findings of osteomyelitis. Ankle mortise is normally aligned.       Ulceration in the posterior aspect of the calcaneus without radiographic findings of osteomyelitis.   Severe demineralization of the bones which limits evaluation for subtle fractures.   Questionable age-indeterminate 2nd, 3rd, and 4th metatarsal neck fractures which may be however artifactual. Correlate with history for trauma and point tenderness.   MACRO: None.   Signed by: Yeni Mantilla 7/22/2024 2:54 PM Dictation workstation:   [id number]    XR chest 1 view    Result Date: 7/22/2024  STUDY: Chest Radiograph;  7/22/2024 10:48AM INDICATION: Hypotension. COMPARISON: 5/28/2024 XR Chest. ACCESSION NUMBER(S): [id number] ORDERING CLINICIAN: LOUIE NORIEGA TECHNIQUE:  Frontal chest was obtained at 11:42 hours. FINDINGS: CARDIOMEDIASTINAL SILHOUETTE: Cardiomediastinal silhouette is normal in size and configuration.  LUNGS: Linear atelectasis in the left base.  Small hazy opacities in the lung bases similar compared to prior.  Trace pleural effusions.  ABDOMEN: No remarkable upper abdominal findings.  BONES: No acute osseous changes.    Linear atelectasis in the left base. Small hazy opacities in the lung bases similar compared to prior. Trace pleural effusions. Signed by Roe Ruiz MD     Assessment/Plan   Edwar Hemphill is a 39 y.o. male with PMH of ESRD on HD (MWF, right femoral line), HFrEF (EF 35-40% 5/2024), T2DM, and multiple hospitalizations for L AKA infection and MRSA bacteremia who presented from SNF with hypotension. Now with blood cultures growing Staph aureus, sensitivities pending. Likely  MRSA given his previous bacteremia with MRSA. He is stabilized on levo 0.08 and midodrine 10 mg and admitted to ICU. No obvious focal findings yet. Pain in left buttocks without sacral ulcer/skin breakage or cellulitis per nurse. Given his right femoral dialysis line, suspicion for source is catheter-associated infection. TTE 7/23 with thickened TV and mobile densities associated with the TV with severe TR, not seen previously (TTE 5/14/24 with trivial TR and normal TV). Pending sensitivities but with high chance of MRSA, suspicion for endocarditis, and right femoral cath, there may be difficulty clearing. We will empirically treat with Vancomycin and Ceftaroline for the Staph aureus.   Blood cx 7/22 with MRSA. POLI 7/24 with EF 40 and global hypokinesis, large vegetation encasing TDC with involvement and destruction of septal leaflet of TV and severe TR. Small mobile echodensity on aortic valve, concerning for vegetation vs degenerative change. Moderate patent foramen ovale.      Recommendations:  Continue Vancomycin and Ceftaroline  Follow up MR wrist. Previous history of left wrist septic arthritis with MRSA s/p I&D in 2021. Now with severe wrist pain per patient and current staph bacteremia.  No intervention at this time per cardiac surgery regarding POLI 6/4 with vegetations on TV and right femoral catheter tip   Plan for replacement of right femoral line per vascular surgery/SANTOSH Robles MD

## 2024-07-25 NOTE — PROGRESS NOTES
Edwar Hemphill is a 39 y.o. male on day 3 of admission presenting with Septic shock (Multi).      Subjective   Edwar Hemphill is a 39 y.o. male presenting with PMHx significant for ESRD (MWF  via right femoral line), NICM,  HFrEF (LVEF 35-40% on 5/2024 POLI), paroxysmal A-fib, [id number] 6 ,anemia, DM2, Left AKA, RUE amputation, HTN, PVD, GERD, chronic smoker.  Patient admitted to MICU for septic shock, bacteremia and endocarditis.  TTE showed TV vegetation and possible source could be femoral catheter.  Vascular medicine has been consulted for anticoagulation recommendations given history of recurrent VTE's.  He was previously treated with Eliquis however this was complicated by recent GI bleed in June 2024 he was then lost to follow-up.     Patient was seen and examined at bedside.  No acute signs of bleeding noted.  Platelet count slightly improved since yesterday and is at 54.  He had received 2 units platelet transfusion and 1 unit PRBC transfusion yesterday.  PF4 pending.  Bivalirudin held due to acute drop in his hemoglobin from 7.5-6.6.  Repeat hemoglobin today 7.6.  POLI showed vegetations associated with his TDC with extension to TV and destruction of the septal leaflet and severe TR.  He is currently being evaluated by cardiac surgery for potential valve replacement.       Objective     Last Recorded Vitals  BP 79/53   Pulse 96   Temp 35.5 °C (95.9 °F) (Temporal)   Resp 14   Wt 75.4 kg (166 lb 3.6 oz)   SpO2 (!) 80%   Intake/Output last 3 Shifts:    Intake/Output Summary (Last 24 hours) at 7/25/2024 1214  Last data filed at 7/25/2024 0800  Gross per 24 hour   Intake 1403.72 ml   Output 272 ml   Net 1131.72 ml       Admission Weight  Weight: 68.5 kg (151 lb) (07/22/24 1031)    Daily Weight  07/25/24 : 75.4 kg (166 lb 3.6 oz)    Image Results  Transesophageal Echo (POLI)     [facility], [address] [place] [address]                 Tel [phone] and Fax  [phone]    TRANSESOPHAGEAL ECHOCARDIOGRAM REPORT       Patient Name:      XIOMARA FERGUSON         Francesco Physician:    [id number] Penny Shaw MD  Study Date:        7/24/2024            Ordering Provider:    [id number] AZEEM SNYDER  MRN/PID:           [id number]             Fellow:               [id number] Trey Hurtado MD  Accession#:        [id number]         Nurse:                Radha Alaniz RN  Date of Birth/Age: 1985 / 39 years Sonographer:  Gender:            M                    Additional Staff:  Height:            175.00 cm            Admit Date:  Weight:            74.00 kg             Admission Status:     Inpatient -                                                                Routine  BSA / BMI:         1.89 m2 / 24.16      Encounter#:           [id number]                     kg/m2  Blood Pressure:    123/88 mmHg          Department Location:    Study Type:    TRANSESOPHAGEAL ECHO (POLI)  Diagnosis/ICD: Acute and subacute endocarditis, unspecified-I33.9  Indication:    endocarditis  CPT Code:      POLI Complete-30802; Doppler Limited-40111; Color Doppler-71345    Patient History:  Allergies:         Daptomycin.  Pertinent History: Renal Failure, HTN, Hyperlipidemia, Previous DVT, CHF and                     A-Fib. L AKA, R above elbow amputation.    Study Detail: Agitated saline used as a contrast agent for intraseptal flow                evaluation.       PHYSICIAN INTERPRETATION:  POLI Details: Technically adequate omniplane transesophageal echocardiogram performed. Agitated saline contrast echo was performed to assess for the presence of a patent foramen ovale.  POLI Medication: The pharynx was anesthetized with lidocaine ointment and Topix spray. The patient was sedated using moderate sedation. Midazolam and  Fentanyl was used to sedate the patient for this exam.  POLI Procedure: The probe was passed without difficulty. Complications encountered during procedure: Patient tolerated the procedure well without any apparent complications.  Left Ventricle: Left ventricular ejection fraction is moderately decreased, by visual estimate at 40%. There is global hypokinesis of the left ventricle with minor regional variations. The left ventricular cavity size is normal. Left ventricular diastolic filling was not assessed.  Left Atrium: The left atrium is normal in size. There is no definite left atrial thrombus present. There is a moderately sized patent foramen ovale. The patent foramen ovale was visualized using agitated saline contrast. A bubble study using agitated saline was performed. Bubble study is positive. A moderate PFO (11-20 bubbles) was demonstrated. There is no thrombus visualized in the left atrial appendage. Agitated saline contrast study was positive for an intracardiac shunt (moderate sized PFO).  Right Ventricle: The right ventricle is mildly enlarged. There is moderately reduced right ventricular systolic function.  Right Atrium: The right atrium is mildly dilated. There is a device visualized in the right atrium. There is a large vegetation that circumferentially encases the femoral TDC ( catheter). There is also involvement of the TV with likely destruction of the septal leaflet of the TV and resultant severe TR. ( the vegatation surrounding the catheter measures at least 1.9cm x 2.5cm.  Aortic Valve: The aortic valve is trileaflet. There is no evidence of aortic valve regurgitation. Trielaflet AV with no paravalular abscess or AI, however there is a small mobile echodensity on the LVOT side of the AV with independent motion whcih could be consitent with a small vegetation vs degenerative changes.  Mitral Valve: The mitral valve is normal in structure. There is no evidence of mitral valve  regurgitation.  Tricuspid Valve: The tricuspid valve is abnormal. There is severe tricuspid regurgitation. The tricuspid valve regurgitant jet is eccentrically directed. Please see RA comments.  Pulmonic Valve: The pulmonic valve is structurally normal. There is trace pulmonic valve regurgitation.  Pericardium: There is no pericardial effusion noted.  Aorta: The aortic root is normal.  In comparison to the previous echocardiogram(s): Compared with study dated 7/23/2024, Catheter associated vegetation and anatomy of TV are better visualized on today's study. Otherwise, there are no gross changes from the previous surface echo.       CONCLUSIONS:   1. Left ventricular ejection fraction is moderately decreased, by visual estimate at 40%.   2. There is global hypokinesis of the left ventricle with minor regional variations.   3. Mildly enlarged right ventricle.   4. There is moderately reduced right ventricular systolic function.   5. Agitated saline contrast study was positive for an intracardiac shunt (moderate sized PFO).   6. There is a large vegetation that circumferentially encases the femoral TDC ( catheter). There is also involvement of the TV with likely destruction of the septal leaflet of the TV and resultant severe TR. ( the vegatation surrounding the catheter measures at least 1.9cm x 2.5cm.   7. The tricuspid valve is abnormal.   8. Severe tricuspid regurgitation visualized.   9. Trielaflet AV with no paravalular abscess or AI, however there is a small mobile echodensity on the LVOT side of the AV with independent motion whcih could be consitent with a small vegetation vs degenerative changes.  10. Moderately sized patent foramen ovale.  11. No left atrial thrombus.  12. MICU team nofitied of findings at the time of study.  13. A bubble study using agitated saline was performed. Bubble study is positive. A moderate PFO (11-20 bubbles) was demonstrated.  14. Compared with study dated 7/23/2024, Catheter  associated vegetation and anatomy of TV are better visualized on today's study. Otherwise, there are no gross changes from the previous surface echo.    QUANTITATIVE DATA SUMMARY:  LV SYSTOLIC FUNCTION BY 2D PLANIMETRY (MOD):                       Normal Ranges:  EF-Visual:      40 %  LV EF Reported: 40 %       [id number] Penny Shaw MD  Electronically signed on 7/24/2024 at 6:31:34 PM       ** Final **  Vascular US Lower Extremity Venous Duplex Right              [place] [id number]    Tel [phone] and Fax [phone]       Vascular Lab Report  [facility] US LOWER EXTREMITY VENOUS DUPLEX RIGHT       Patient Name:      XIOMARA TRUJILLO PHYLLIS         Reading Physician:  [id number] Bridger Ervin MD  Study Date:        7/24/2024            Ordering Physician: [id number] AZEEM SNYDER  MRN/PID:           [id number]             Technologist:       Antonietta Sierra T  Accession#:        [id number]         Technologist 2:  Date of Birth/Age: 1985 / 39 years Encounter#:         [id number]  Gender:            M  Admission Status:  Inpatient            Location Performed: [facility]       Diagnosis/ICD: Other specified soft tissue disorders-M79.89  Indication:    Limb swelling  CPT Codes:     11767 Peripheral venous duplex scan for DVT Limited       CONCLUSIONS:  Right Lower Venous: Unable to obtain right groin compression or visualize the DEIV, CFV, profunda and proximal FVs due to line placement. Can not rule out thrombus in non-visualized areas. Remainder visualized vessels show no evidence of DVT.  Left Lower Venous: There are chronic changes visualized in the common femoral and proximal femoral veins.     Imaging & Doppler Findings:     Right       Compressible Thrombus        Flow  FV Proximal     Yes        None   Spontaneous/Phasic  FV Mid          Yes         None  FV Distal       Yes        None  Popliteal       Yes        None   Spontaneous/Phasic  Peroneal        Yes        None  PTV             Yes        None       Left        Compress Thrombus  CFV         Partial  Chronic  FV Proximal Partial  Chronic       [id number] Bridger Ervin MD  Electronically signed by [id number] Bridger Ervin MD on 7/24/2024 at 12:41:59 PM       ** Final **  Electrocardiogram, 12-lead PRN ACS symptoms  Normal sinus rhythm  Possible Anterior infarct , age undetermined  Abnormal ECG  When compared with ECG of 22-JUL-2024 11:06,  Borderline criteria for Anterior infarct are now Present  Nonspecific T wave abnormality no longer evident in Inferior leads  ECG 12 lead  Normal sinus rhythm  Prolonged QT  Abnormal ECG  When compared with ECG of 22-JUL-2024 10:44,  No significant change was found  See ED provider note for full interpretation and clinical correlation  Confirmed by Kelsie Cherry ([id number]) on 7/24/2024 5:46:20 AM      Physical Exam  General appearance: alert and oriented, in no acute distress  Lungs: clear to auscultation bilaterally  Heart: regular rate and rhythm, S1, S2 normal, no murmur  Abdomen: soft, non-tender; bowel sounds normal;  Extremities: Right upper extremity amputation, left lower extremity AKA, chronic skin changes in right lower extremity, tunnel catheter in right thigh  Skin: Skin color, texture, turgor normal. No rashes or lesions  Neurologic: Grossly normal     MEDS:  atorvastatin, 40 mg, oral, Nightly  B complex-vitamin C-folic acid, 1 capsule, oral, Daily  ceftaroline, 400 mg, intravenous, q8h  cholecalciferol, 50,000 Units, oral, Once per day on Monday Thursday  epoetin tyson or biosimilar, 10,000 Units, subcutaneous, Once per day on Monday Wednesday Friday  folic acid, 1 mg, oral, Daily  insulin glargine, 2 Units, subcutaneous, Nightly  insulin lispro, 0-5 Units, subcutaneous, q4h  levothyroxine, 125 mcg, oral, Daily before breakfast  melatonin, 6 mg, oral,  Nightly  midodrine, 10 mg, oral, q8h  nystatin, 1 Application, Topical, BID  pantoprazole, 40 mg, oral, Daily before breakfast  thiamine, 200 mg, oral, Daily  vancomycin, 500 mg, intravenous, q12h      norepinephrine, 0.01-1 mcg/kg/min, Last Rate: Stopped (07/25/24 1030)  PrismaSol 4/2.5, 25 mL/kg/hr, Last Rate: 25 mL/kg/hr (07/24/24 2334)        Assessment/Plan        Edwar Hemphill is a 39 y.o. male presenting with PMHx significant for ESRD (MWF  via right femoral line), NICM,  HFrEF (LVEF 35-40% on 5/2024 POLI), paroxysmal A-fib, [id number] 6 ,anemia, DM2, Left AKA, RUE amputation, HTN, PVD, GERD, chronic smoker.  Patient admitted to MICU for septic shock, bacteremia and endocarditis.  TTE showed TV vegetation and possible source could be femoral catheter.  Vascular medicine has been consulted for anticoagulation recommendations given history of recurrent VTE's.  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  His most recent common femoral VTE was in April 2024 (b/l subclavian DVT, L IJ DVT, b/l innominate occlusion, complete occlusion of intrahepatic IVC s/p angioplasty ).  He was also recently admitted to [facility] with upper GI bleed and anticoagulation was held at that time.   Given his [id number] score and history of recent VTE in April 2024 he would need an appropriate anticoagulation.  Please discuss with the GI to weigh in on when it would be appropriate to transition him to p.o. anticoagulation.  If he remains low risk for rebleeding from upper GI and once platelet counts improve then we can consider restarting him on Eliquis 5 mg twice daily which was his home dose.     On admission he was started on heparin subcu every 8 hours.  Noted to have significant drop in platelet counts on 7/24. ?concern for HIT.  Continue to closely monitor for any acute bleed.  PF4 with reflex to ABRIL pending.  Heparin on hold  Recommended bivalirudin however not started due to acute drop in his  hemoglobin requiring 1 unit PRBC transfusion yesterday  He also received 2 unit platelet transfusion.     VM signs off . Please call us back if further recommendation is needed for anticoagulation.      Discussed with  attending, Dr. Hernandez.           Moane Wang MD   Fellow

## 2024-07-25 NOTE — CARE PLAN
The patient's goals for the shift include  none    The clinical goals for the shift include pt will not require an increase in vasopresor support during shift    Over the shift, the patient did not make progress toward the following goals. Barriers to progression include fatigue. Recommendations to address these barriers include   Problem: Skin  Goal: Decreased wound size/increased tissue granulation at next dressing change  Outcome: Progressing  Flowsheets (Taken 7/24/2024 2037)  Decreased wound size/increased tissue granulation at next dressing change:   Protective dressings over bony prominences   Promote sleep for wound healing  Goal: Participates in plan/prevention/treatment measures  Outcome: Progressing  Flowsheets (Taken 7/24/2024 2037)  Participates in plan/prevention/treatment measures: Elevate heels  Goal: Prevent/manage excess moisture  Outcome: Progressing  Flowsheets (Taken 7/24/2024 2037)  Prevent/manage excess moisture:   Monitor for/manage infection if present   Cleanse incontinence/protect with barrier cream  Goal: Prevent/minimize sheer/friction injuries  Outcome: Progressing  Flowsheets (Taken 7/24/2024 2037)  Prevent/minimize sheer/friction injuries:   Turn/reposition every 2 hours/use positioning/transfer devices   Complete micro-shifts as needed if patient unable. Adjust patient position to relieve pressure points, not a full turn  Goal: Promote/optimize nutrition  Outcome: Progressing  Flowsheets (Taken 7/24/2024 2037)  Promote/optimize nutrition: Monitor/record intake including meals  Goal: Promote skin healing  Outcome: Progressing  Flowsheets (Taken 7/24/2024 2037)  Promote skin healing:   Turn/reposition every 2 hours/use positioning/transfer devices   Protective dressings over bony prominences   .

## 2024-07-25 NOTE — CONSULTS
[facility]   [facility]  INITIAL CONSULT NOTE       Reason For Consult  Beginning anticoagulation with hx of UGI bleed.     SUBJECTIVE     History Of Present Illness  39 y.o. male with PMHx significant for ESRD (MWF  via right femoral line), NICM,  HFrEF (LVEF 35-40% on 5/2024 POLI), anemia, DM2, Left AKA, RUE amputation, HTN, PVD, and GERD, admitted on 7/22/2024 with sepsis due to infectious endocarditis 2/2 to seeding from femoral vein catheter. GI is consulted for beginning anticoagulation with hx of UGI bleed. Patient receives care at [facility] had dialysis Monday leaving him lower than his dry weight and reported to the ED when his blood pressure was unresponsive to home midodrine. Patient hypotensive, tachycardic with a leukocytosis of 20k so imaging was used to find a source to explain shock like symptoms. Patient had a TTE revealing EF 40%, global hypokinesis of LV, enlarged RV and large vegetation circumferentially encasing the femoral TDC.     Patient was started started on Vancomycin/Zosyn due to hx of MRSA sepsis and given subcu heparin q8h. Heparin was stopped as he was noted to have a drop in platelets. Patients most recent VTE was 4/24 ( B/L subclavian DVT, LIJ DVT, complete occlusion of intrahepatic IVC s/p angioplasty. Speaking with the patient today he denies coffee ground emesis, coughing up blood, dark tarry stools, abdominal pain and BRPR.      Review of Systems  No dark tarry stools  No hematochezia  No hematemesis   No abdominal pain   No coughing up blood       Past Medical History:    Past Medical History:   Diagnosis Date    Chronic kidney disease     Diabetes mellitus (Multi)     ESRD (end stage renal disease) (Multi)     Essential (primary) hypertension 05/26/2017    HTN (hypertension), benign    GERD (gastroesophageal reflux disease)     Hyperlipidemia     Hypothyroidism        Home Medications  Medications Prior to Admission   Medication Sig  Dispense Refill Last Dose    atorvastatin (Lipitor) 40 mg tablet Take 1 tablet (40 mg) by mouth once daily at bedtime.   Unknown    B complex-vitamin C-folic acid (Nephrocaps) 1 mg capsule Take 1 capsule by mouth once daily.   Unknown    cholecalciferol (Vitamin D-3) 50,000 unit capsule Take 1 capsule (50,000 Units) by mouth 2 times a week. On Monday and Thursday   Unknown    epoetin tyson-epbx (Retacrit) 10,000 unit/mL injection Inject 1 mL (10,000 Units) under the skin 3 times a week. 12 mL 11 Unknown    folic acid (Folvite) 1 mg tablet Take 1 tablet (1 mg) by mouth once daily.   Unknown    insulin glargine (Lantus U-100 Insulin) 100 unit/mL injection Inject 8 Units under the skin once daily at bedtime. Take as directed per insulin instructions.   Unknown    insulin lispro (HumaLOG) 100 unit/mL injection Inject 0-0.15 mL (0-15 Units) under the skin 3 times a day with meals. Take as directed per insulin instructions. (Patient taking differently: Inject 2-10 Units under the skin 3 times daily (morning, midday, late afternoon). If 151-200= 2 units  201-250= 4 units  251-300= 6 units   301-350= 8 units  351-400= 10 units  >400 notify provider) 13.5 mL 11 Unknown    Lactobacillus acidophilus 1 billion cell tablet Take 1 tablet by mouth 2 times a day.   Unknown    levothyroxine (Synthroid, Levoxyl) 125 mcg tablet Take 1 tablet (125 mcg) by mouth once daily in the morning. Take before meals.   Unknown    loperamide (Imodium) 2 mg capsule Take 2 capsules (4 mg) by mouth every 2 hours if needed for diarrhea (FOR LOOSE STOLLS. 2 TABLETS AFTER EVERY EPISODE. DO NOT EXCEED 16MG TOTAL IN 24 HOURS.).   Unknown    melatonin 3 mg tablet Take 2 tablets (6 mg) by mouth once daily at bedtime.   Unknown    midodrine (Proamatine) 10 mg tablet Take 1 tablet (10 mg) by mouth every 4 hours if needed (hypotension).   Unknown    oxyCODONE-acetaminophen (Percocet) 5-325 mg tablet Take 1 tablet by mouth every 6 hours if needed for severe pain  (7 - 10). 10 tablet 0 Unknown    pantoprazole (ProtoNix) 40 mg EC tablet Take 1 tablet (40 mg) by mouth once daily in the morning. Take before meals. Do not crush, chew, or split. Do not start before November 17, 2023. 30 tablet 11 Unknown    sevelamer carbonate (Renvela) 800 mg tablet Take 3 tablets (2,400 mg) by mouth 3 times daily (morning, midday, late afternoon).   Unknown    sodium zirconium cyclosilicate (Lokelma) 5 gram packet Take 5 g by mouth 3 times a week. On Tuesday, Thursday and Saturday   Unknown    thiamine 100 mg tablet Take 2 tablets (200 mg) by mouth once daily.   Unknown         Surgical History:    Past Surgical History:   Procedure Laterality Date    ARM AMPUTATION AT ELBOW Right 05/2021    AV FISTULA PLACEMENT W/ PTFE      CT AORTA AND BILATERAL ILIOFEMORAL RUNOFF ANGIOGRAM W AND/OR WO IV CONTRAST  02/09/2023    CT AORTA AND BILATERAL ILIOFEMORAL RUNOFF ANGIOGRAM W AND/OR WO IV CONTRAST 2/9/2023 [facility]CTOR [facility]    IR CVC EXCHANGE  11/13/2023    IR CVC EXCHANGE 11/13/2023 [facility]    IR CVC PICC  10/19/2023    IR CVC PICC    IR CVC PICC  2/28/2022    IR CVC PICC    LEG AMPUTATION THROUGH KNEE Left 07/08/2023    LEG AMPUTATION THROUGH LOWER TIBIA AND FIBULA Left 07/05/2023    TOE AMPUTATION Left 02/17/2023    left 4th    WOUND DEBRIDEMENT Left 07/26/2023    leg    WOUND DEBRIDEMENT Left 08/02/2023    multiple leg debridements       Allergies:    Allergies   Allergen Reactions    Cashew Nut Anaphylaxis and Swelling     cashews    Daptomycin Respiratory depression     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 Hypoxia, bilateral lung infiltrates, and peripheral eosinophilia documented in ID note from that date.       Social History:    Social History     Socioeconomic History    Marital status:      Spouse name: Not on file    Number of children: Not on file    Years of education: Not on file    Highest education level: Not on file   Occupational History    Not on file    Tobacco Use    Smoking status: Every Day     Current packs/day: 0.25     Average packs/day: 0.3 packs/day for 15.0 years (3.8 ttl pk-yrs)     Types: Cigarettes    Smokeless tobacco: Never   Substance and Sexual Activity    Alcohol use: Not on file    Drug use: Not on file    Sexual activity: Not on file   Other Topics Concern    Not on file   Social History Narrative    Not on file     Social Determinants of Health     Financial Resource Strain: Low Risk  (7/23/2024)    Overall Financial Resource Strain (CARDIA)     Difficulty of Paying Living Expenses: Not very hard   Food Insecurity: No Food Insecurity (7/23/2024)    Hunger Vital Sign     Worried About Running Out of Food in the Last Year: Never true     Ran Out of Food in the Last Year: Never true   Transportation Needs: No Transportation Needs (7/23/2024)    PRAPARE - Transportation     Lack of Transportation (Medical): No     Lack of Transportation (Non-Medical): No   Physical Activity: Inactive (7/23/2024)    Exercise Vital Sign     Days of Exercise per Week: 0 days     Minutes of Exercise per Session: 0 min   Stress: Not on file   Social Connections: Not on file   Intimate Partner Violence: Not on file   Housing Stability: Low Risk  (7/23/2024)    Housing Stability Vital Sign     Unable to Pay for Housing in the Last Year: No     Number of Times Moved in the Last Year: 0     Homeless in the Last Year: No       Family History:    Family History   Problem Relation Name Age of Onset    Other (DIABETES DUE TO UNDERLYING CONDITION WITH MICROALBUMINURIA) Father      Other (DIABETES DUE TO UNDERLYING CONDITION WITH MICROALBUMINURIA [Other]) Other AUNT     Other (DIABETES DUE TO UNDERLYING CONDITION WITH MICROALBUMINURIA [Other]) Other GP        EXAM     Vitals:    Vitals:    07/25/24 1100 07/25/24 1150 07/25/24 1200 07/25/24 1300   BP: 86/60  86/65 77/59   BP Location:   Left arm    Patient Position:   Lying    Pulse: 91  94 94   Resp: 18  17 14   Temp:  35.5 °C  (95.9 °F) 35.5 °C (95.9 °F)    TempSrc:  Temporal Temporal    SpO2: 95%  95%    Weight:       Height:         Failed to redirect to the Timeline version of the REVFS SmartLink.    Intake/Output Summary (Last 24 hours) at 7/25/2024 1645  Last data filed at 7/25/2024 1200  Gross per 24 hour   Intake 690.37 ml   Output 336 ml   Net 354.37 ml         Physical Exam  General:  no acute distress  HEENT: moist MM  Respiratory: CTA bilaterally, normal work of breathing  Cardiovascular: RRR, no murmurs/rubs/gallops  Abdomen: Soft, nontender, nondistended, bowel sounds present. No masses palpated  Extremities: RUE amputation, L AKA, Right sided femoral catheter in place, no edema,  Neuro: alert and oriented X4    OBJECTIVE                                                                              Medications       Current Facility-Administered Medications:     acetaminophen (Tylenol) tablet 650 mg, 650 mg, oral, q4h PRN, 650 mg at 07/23/24 2148 **OR** acetaminophen (Tylenol) oral liquid 650 mg, 650 mg, nasogastric tube, q4h PRN **OR** acetaminophen (Tylenol) suppository 650 mg, 650 mg, rectal, q4h PRN, Blaine Gonzalez MD    atorvastatin (Lipitor) tablet 40 mg, 40 mg, oral, Nightly, Blaine Gonzalez MD, 40 mg at 07/24/24 2046    B complex-vitamin C-folic acid (Nephrocaps) capsule 1 capsule, 1 capsule, oral, Daily, Blaine Gonzalez MD, 1 capsule at 07/24/24 0830    benzocaine (Hurricaine) 20 % mouth spray, , , PRN, Penny Shaw MD, 2 spray at 07/24/24 1635    ceftaroline (Teflaro) 400 mg in dextrose 5% 50 mL IV, 400 mg, intravenous, q8h, Jena Hernández MD, Stopped at 07/25/24 1011    cholecalciferol (Vitamin D-3) capsule 50,000 Units, 50,000 Units, oral, Once per day on Monday Thursday, Blaine Gonzalez MD, 50,000 Units at 07/25/24 1039    dextrose 50 % injection 12.5 g, 12.5 g, intravenous, q15 min PRN, Blaine Gonzalez MD, 12.5 g at 07/24/24 0439    dextrose 50 % injection 25 g, 25 g, intravenous, q15 min PRN, Blaine Gonzalez,  MD    diphenhydrAMINE (BENADryl) capsule 25 mg, 25 mg, oral, q6h PRN, Praveen Koo MD PhD, 25 mg at 07/24/24 1525    epoetin tyson (Epogen,Procrit) injection 10,000 Units, 10,000 Units, subcutaneous, Once per day on Monday Wednesday Friday, Blaine Gonzalez MD, 10,000 Units at 07/24/24 0829    fentaNYL PF (Sublimaze) injection, , , PRN, Penny Shaw MD, 12.5 mcg at 07/24/24 1659    folic acid (Folvite) tablet 1 mg, 1 mg, oral, Daily, Blaine Gonzalez MD, 1 mg at 07/25/24 1040    glucagon (Glucagen) injection 1 mg, 1 mg, intramuscular, q15 min PRN, Blaine Gonzalez MD    glucagon (Glucagen) injection 1 mg, 1 mg, intramuscular, q15 min PRN, Blaine Gonzalez MD    insulin glargine (Lantus) injection 2 Units, 2 Units, subcutaneous, Nightly, Praveen Koo MD PhD, 2 Units at 07/24/24 2047    insulin lispro (HumaLOG) injection 0-5 Units, 0-5 Units, subcutaneous, q4h, Praveen Koo MD PhD    levothyroxine (Synthroid, Levoxyl) tablet 125 mcg, 125 mcg, oral, Daily before breakfast, Blaine Gonzalez MD, 125 mcg at 07/25/24 1039    lidocaine (LMX) 4 % cream, , Topical, 4x daily PRN, Blaine Gonzalez MD, Given at 07/24/24 0828    melatonin tablet 6 mg, 6 mg, oral, Nightly, Blaine Gonzalez MD, 6 mg at 07/24/24 2046    midazolam (Versed) injection, , , PRN, Penny Shaw MD, 0.5 mg at 07/24/24 1655    midodrine (Proamatine) tablet 10 mg, 10 mg, oral, q8h, Black Mobley MD, 10 mg at 07/25/24 0548    norepinephrine (Levophed) 8 mg in dextrose 5% 250 mL (0.032 mg/mL) infusion (premix), 0.01-1 mcg/kg/min, intravenous, Continuous, Gracie Murguia, DO, Stopped at 07/25/24 1030    nystatin (Mycostatin) 100,000 unit/gram powder 1 Application, 1 Application, Topical, BID, Blaine Gonzalez MD, 1 Application at 07/25/24 1040    pantoprazole (ProtoNix) EC tablet 40 mg, 40 mg, oral, Daily before breakfast, Blaine Gonzalez MD, 40 mg at 07/25/24 1040    PrismaSol 4/2.5 CRRT solution, 25 mL/kg/hr, CRRT, Continuous, Mary Hayes DO, Last  Rate: 1,840 mL/hr at 07/24/24 2334, 25 mL/kg/hr at 07/24/24 2334    thiamine (Vitamin B-1) tablet 200 mg, 200 mg, oral, Daily, Blaine Gonzalez MD, 200 mg at 07/25/24 1040    vancomycin (Vancocin) 500 mg in dextrose 5%  mL, 500 mg, intravenous, q12h, Dayton Young MD, Stopped at 07/25/24 0510    vancomycin (Vancocin) pharmacy to dose - pharmacy monitoring, , miscellaneous, Daily PRN, Blaine Gonzalez MD                                                                            Labs     Results for orders placed or performed during the hospital encounter of 07/22/24 (from the past 24 hour(s))   Transesophageal Echo (POLI)   Result Value Ref Range    LV EF 40 %   POCT GLUCOSE   Result Value Ref Range    POCT Glucose 161 (H) 74 - 99 mg/dL   POCT GLUCOSE   Result Value Ref Range    POCT Glucose 155 (H) 74 - 99 mg/dL   CBC and Auto Differential   Result Value Ref Range    WBC 14.8 (H) 4.4 - 11.3 x10*3/uL    nRBC 0.0 0.0 - 0.0 /100 WBCs    RBC 2.67 (L) 4.50 - 5.90 x10*6/uL    Hemoglobin 7.6 (L) 13.5 - 17.5 g/dL    Hematocrit 22.0 (L) 41.0 - 52.0 %    MCV 82 80 - 100 fL    MCH 28.5 26.0 - 34.0 pg    MCHC 34.5 32.0 - 36.0 g/dL    RDW 17.3 (H) 11.5 - 14.5 %    Platelets 54 (L) 150 - 450 x10*3/uL    Neutrophils % 82.2 40.0 - 80.0 %    Immature Granulocytes %, Automated 2.0 (H) 0.0 - 0.9 %    Lymphocytes % 7.2 13.0 - 44.0 %    Monocytes % 7.7 2.0 - 10.0 %    Eosinophils % 0.6 0.0 - 6.0 %    Basophils % 0.3 0.0 - 2.0 %    Neutrophils Absolute 12.15 (H) 1.20 - 7.70 x10*3/uL    Immature Granulocytes Absolute, Automated 0.30 0.00 - 0.70 x10*3/uL    Lymphocytes Absolute 1.06 (L) 1.20 - 4.80 x10*3/uL    Monocytes Absolute 1.13 (H) 0.10 - 1.00 x10*3/uL    Eosinophils Absolute 0.09 0.00 - 0.70 x10*3/uL    Basophils Absolute 0.04 0.00 - 0.10 x10*3/uL   Comprehensive Metabolic Panel   Result Value Ref Range    Glucose 157 (H) 74 - 99 mg/dL    Sodium 131 (L) 136 - 145 mmol/L    Potassium 3.6 3.5 - 5.3 mmol/L    Chloride 98 98 -  107 mmol/L    Bicarbonate 25 21 - 32 mmol/L    Anion Gap 12 10 - 20 mmol/L    Urea Nitrogen 28 (H) 6 - 23 mg/dL    Creatinine 3.81 (H) 0.50 - 1.30 mg/dL    eGFR 20 (L) >60 mL/min/1.73m*2    Calcium 7.9 (L) 8.6 - 10.6 mg/dL    Albumin 2.2 (L) 3.4 - 5.0 g/dL    Alkaline Phosphatase 113 33 - 120 U/L    Total Protein 5.5 (L) 6.4 - 8.2 g/dL    AST 8 (L) 9 - 39 U/L    Bilirubin, Total 1.1 0.0 - 1.2 mg/dL    ALT 3 (L) 10 - 52 U/L   Magnesium   Result Value Ref Range    Magnesium 1.95 1.60 - 2.40 mg/dL   Phosphorus   Result Value Ref Range    Phosphorus 3.1 2.5 - 4.9 mg/dL   Calcium, ionized   Result Value Ref Range    POCT Calcium, Ionized 1.14 1.1 - 1.33 mmol/L   POCT GLUCOSE   Result Value Ref Range    POCT Glucose 152 (H) 74 - 99 mg/dL   POCT GLUCOSE   Result Value Ref Range    POCT Glucose 152 (H) 74 - 99 mg/dL   POCT GLUCOSE   Result Value Ref Range    POCT Glucose 128 (H) 74 - 99 mg/dL   Blood Culture    Specimen: Peripheral Venipuncture; Blood culture   Result Value Ref Range    Blood Culture Loaded on Instrument - Culture in progress                                                                               Imaging           === 07/22/24 ===    XR ANKLE 3+ VIEWS RIGHT    - Impression -  Ulceration in the posterior aspect of the calcaneus without  radiographic findings of osteomyelitis.    Severe demineralization of the bones which limits evaluation for  subtle fractures.    Questionable age-indeterminate 2nd, 3rd, and 4th metatarsal neck  fractures which may be however artifactual. Correlate with history  for trauma and point tenderness.    CTAP 6/5/2024  1.  No aneurysm or dissection   2.  Complex plaque at the level of the right common femoral artery is   demonstrated.   3. Biliary excretion of contrast from prior excretion of 06/03/2024,   identified within the lumen of the gallbladder as well as in the large   intestine.   4. Diffuse esophageal wall thickening, with areas of high density that   could  represent bleeding.   5.  Bilateral pleural effusions                                                                              GI Procedures     EGD 6/6/2024  - Large more organized clot with some fresh clot                          oozing from the base of the clot in the mid                          esophagus and in the distal esophagus.                          - Normal stomach.                          - Small duodenal polyp was seen. Otherwise, the                          examined duodenum was normal                          - No specimens collected.   EGD 6/4/2024  Red blood in the lower third of the esophagus and                          in the middle third of the esophagus.                          - Normal stomach.                          - A 5 mm sessile polyp was seen in the duocenal                          buolb. No biopies taken                          - Erythematous duodenopathy.                          - No specimens collected.      EGD 7/11/2023  - Normal hypopharynx.                          - Nasogastric tube was found ending in the stomach and                          was removed.                          - LA Grade C esophagitis with superficial ulceration                          and no bleeding was found at the GE junction. Possible                          healing [place] tear.                          - Bilious gastric fluid.                          - Mild focal nasogastric tube trauma present in                          stomach.                          - Otherwise normal appearing stomach. No gastric                          ulcers, varices or AVMs. No stigmata of recent                          bleeding.                          - Normal duodenal bulb and second portion of the                          duodenum.                          - No specimens collected.              ASSESSMENT / PLAN                  ASSESSMENT/PLAN:    38 y/o M with hx of ESRD ( MWF via R femoral  line), HFrEF (EF 40%), anemia, T2DM, left AKA, RUE amputation, HTN, PVD and GERD admitted for sepsis due to due to endocarditis 2/2 seeding from femoral catheter. GI was consulted for initiation of anticoagulation due to patient's hx of recurrent GI bleed.     #Sepsis   # TV Endocarditis   # Possible UGI bleed  #ESRD  > Blood cultures positive for  MRSA  > Echo consistent with endocarditis.  > CBC with appropriately incremented 6.6-->7.6 s/p 1 unit pRBC's  > No reports of hematochezia or melena   > Patient with procedure and clips place for known esophageal ulcer.  Patient with known history of esophageal ulcer, treated 6/7/24. Although patient has acute hgb drop, it is less likely to be previously known esophageal ulcer given the date of treatment. UGI source of bleed cannot be effectively ruled out at this time, so we will continue to watch CBC's carefully. Patient also has active endocarditis, extensive VTE history and will be hospitalized for treatment, making risk of clot embolization high.     Plan  - Continue PPI IV BID   - Continue to monitor CBC   - Transfuse if Hgb is <7  - From GI perspective high risk of embolic event outweigh potential risk from UGI bleed given known esophageal ulcer was treat 6/7, therefore GI interventions would not be warranted unless signs of bleeding/signs of UGI bleed occurs. There fore GI will sign off at this time.       Patient was seen with discussed with Dr. Alvares    Thank you for the consultation. Hepatology will continue to the follow .  - During weekday hours of 7am- 5pm, please do not hesitate to contact me on [employer] Chat or page [phone] if there are any further questions   - After hours, on weekends, and on holidays, please page [place] [phone]. Thank you.     Don Hernández  PGY-1 Internal Medicine Resident

## 2024-07-25 NOTE — Clinical Note
Q3  Patient Name: Edwar Hemphill  Procedure Date: 6/25/2021 9:13 AM  MRN: [id number]  YOB: 1985  Admit Type: Outpatient  Age: 36  Gender: Male  Note Status: Finalized  Attending MD: Temo Campbell MD  Procedure:            Upper GI endoscopy  Indications:          Melena  Providers:            Temo Campbell MD, Heydi Jaffe MD (Fellow)  Patient Profile:      This is a 36 year old male. Refer to note in patient                       chart for documentation of history and physical.  Referring Physician:  Medicines:            Fentanyl 75 micrograms IV, Midazolam 3 mg IV  Complications:        No immediate complications.  Requesting Provider:  Procedure:            Pre-Anesthesia Assessment:                       - Prior to the procedure, a History and Physical was                       performed, and patient medications and allergies                       were reviewed. The patient's tolerance of previous                       anesthesia was also reviewed. The risks and benefits                       of the procedure and the sedation options and risks                       were discussed with the patient. All questions were                       answered, and informed consent was obtained. Prior                       Anticoagulants: The patient has taken heparin, last                       dose was 2 days prior to procedure. ASA Grade                       Assessment: III - A patient with severe systemic                       disease. After reviewing the risks and benefits, the                       patient was deemed in satisfactory condition to                       undergo the procedure.                       After obtaining informed consent, the endoscope was                       passed under direct vision. Throughout the                       procedure, the patient's blood pressure, pulse, and                       oxygen saturations were monitored continuously. The                        Endoscope was introduced through the mouth, and                       advanced to the second part of duodenum. The upper                       GI endoscopy was accomplished without difficulty.                       The patient tolerated the procedure well.  Moderate Sedation:      The administration of moderate sedation was initiated at 09:32 AM.      Moderate (conscious) sedation was administered by the endoscopy nurse      and supervised by the endoscopist. The following parameters were      monitored: oxygen saturation, heart rate, blood pressure, and      response to care. Total physician intraservice time was 15 minutes.  Findings:      Several blood clots were initially encountered in the mid esophagus,      these were lavaged to reveal LA Grade D (one or more mucosal breaks      involving at least 75% of esophageal circumference) esophagitis was      found 20 cm - 30 cm from the incisors (mid esophagus). Proximal and      distal to this region the esophagus appeared normal.      The entire examined stomach was normal.      Diffuse mildly erythematous mucosa without active bleeding and with      no stigmata of bleeding was found in the duodenal bulb and in the      second portion of the duodenum.      The exam was otherwise without abnormality.      A guide wire was inserted into the duodenum and the endoscope was      removed. A 12 Fr nasogastric tube was advanced over the guide wire      into the duodenum. Placement was confirmed by X-ray.  Impression:           - LA Grade D non-reflux esophagitis.                       - Normal stomach.                       - Erythematous duodenopathy.                       - The examination was otherwise normal.                       - Feeding tube placement was successfully performed.                       - No specimens collected.  Estimated Blood Loss: Estimated blood loss was minimal.  Recommendation:       - Return patient to ICU for ongoing care.                        - Advance diet as tolerated.                       - Use a proton pump inhibitor IV BID, then                       transition to PO PPI BID                       - Repeat endoscopy in 8 weeks to confirm healing                       - Patient has a contact number available for                       emergencies. The signs and symptoms of potential                       delayed complications were discussed with the                       patient. Return to normal activities tomorrow.                       Written discharge instructions were provided to the                       patient.  Attending Participation:      I was present and participated during the entire procedure, including      non-key portions.  Scope In: 9:33:11 AM  Scope Out: 10:04:42 AM  MD Temo Alarcon MD  6/25/2021 11:40:45 AM  This report has been signed electronically by Temo Campbell MD  Number of Addenda: 0  Note Initiated On: 6/25/2021 9:13 AM   [facility]       EGD DIAGNOSTIC  Order: [id number]  [facility]  Gastrointestinal Endoscopy  Patient Name: Edwar Hemphill  Procedure Date: 3/18/2022 9:33 AM  MRN: [id number]  Account Number: [id number]  YOB: 1985  Admit Type: Inpatient  Age: 37  Room: [place] [address]  Gender: Male  Note Status: Finalized  Attending MD: Washington Philip MD  Procedure:           Upper GI endoscopy  Indications:         Acute Anemia  Providers:           Washington Philip MD  Patient Profile:     This is a 37 year old male. Refer to note in patient                       chart for documentation of history and physical.  Referring Physician: Savannah Au (cnp) (Referring MD)  Medicines:           Monitored Anesthesia Care  Complications:       No immediate complications. Estimated blood loss:                       Minimal.  Requesting Provider:  Procedure:           Pre-Anesthesia Assessment:                       - Prior to the procedure, a History and Physical was                        performed, and patient medications and allergies were                       reviewed. The patient's tolerance of previous                       anesthesia was also reviewed. The risks and benefits                       of the procedure and the sedation options and risks                       were discussed with the patient. All questions were                       answered, and informed consent was obtained. Prior                       Anticoagulants: The patient has taken no previous                       anticoagulant or antiplatelet agents. ASA Grade                       Assessment: III - A patient with severe systemic                       disease. After reviewing the risks and benefits, the                       patient was deemed in satisfactory condition to                       undergo the procedure.                       After obtaining informed consent, the endoscope was                       passed under direct vision. Throughout the procedure,                       the patient's blood pressure, pulse, and oxygen                       saturations were monitored continuously. The                       Endoscope was introduced through the mouth, and                       advanced to the second part of duodenum. The upper GI                       endoscopy was technically difficult and complex due                       to excessive bleeding. The patient tolerated the                       procedure well.  Moderate Sedation:       MAC anesthesia was administered by the anesthesia team.  Total Procedure Duration: 0 hours 28 minutes 36 seconds  Findings:       Blood and clot was noted in the distal esophagus. The scope was       removed and the patient was intubated for airway protection.       The Z-line was found 42 cm from the incisors.       Extensive blood and clot was removed revealing a 4 cm (length) by 2       cm (diameter) ulcerative lesion from 35 cm --> 39 cm from the        incisors. It is unclear if this represents extension of prior known       ulceration, a [place] ndiaye tear or a dysplastic lesion. Biopsies       performed. Given size of the lesion on 48 hrs off eliquis (with ESRD)       not amenable to endoscopic clips, cautery, hemospray was applied with       cessation of all bleeding. refer to extensive images taken.       Clot was removed from the fundus revealing no underlying lesions. The       stomach was lavaged with no concerning findings.       The examined duodenum was normal.  Impression:          - Blood and clot was noted in the distal esophagus.                       The scope was removed and the patient was intubated                       for airway protection.                       - Z-line, 42 cm from the incisors.                       - Extensive blood and clot was removed revealing a 4                       cm (length) by 2 cm (diameter) ulcerative lesion from                       35 cm --> 39 cm from the incisors. It is unclear if                       this represents extension of prior known ulceration,                       a [place] ndiaye tear or a dysplastic lesion. Biopsies                       performed. Given size of the lesion on 48 hrs off                       eliquis (with ESRD) not amenable to endoscopic clips,                       cautery, hemospray was applied with cessation of all                       bleeding. Refer to extensive images taken.                       - Clot was removed from the fundus revealing no                       underlying lesions. The stomach was lavaged with no                       concerning findings.                       - Normal examined duodenum.  Recommendation:      - [facility] patient to [facility] for [facility] care.                       - NPO.                       - Continue present medications.                       - Await pathology results                       - BID PPI IV                       -  Carafate slurry QID                       - Continue to hold eliquis.  Attending Participation:       I personally performed the entire procedure.  Scope In: 9:46:14 AM  Scope Out: 10:14:50 AM  MD Washington Heart MD  3/18/2022 10:28:24 AM  This report has been signed electronically by Washington Philip MD  Number of Addenda: 0  Note Initiated On: 3/18/2022 9:33 AM  Estimated Blood Loss:       Estimated blood loss was minimal.  Exam End: --    Specimen Collected: 03/18/22 09:33 Last Resulted: 03/18/22 10:28   Received From: [facility]      EGD DIAGNOSTIC  Order: [id number]  [facility]  Gastrointestinal Endoscopy  Patient Name: Edwar Hemphill  Procedure Date: 3/24/2022 9:40 AM  MRN: [id number]  Account Number: [id number]  YOB: 1985  Admit Type: Inpatient  Age: 37  Room: [place] [address]  Gender: Male  Note Status: Supervisor Override  Attending MD: Mj Sethi MD  Procedure:           Upper GI endoscopy  Indications:         Follow-up of esophageal ulcer  Providers:           Mj Sethi MD  Patient Profile:     This is a 37 year old male. Refer to note in patient                       chart for documentation of history and physical.  Referring Physician: Savannah Au (cnp) (Referring MD)  Medicines:           Monitored Anesthesia Care  Complications:       No immediate complications.  Requesting Provider:  Procedure:           Pre-Anesthesia Assessment:                       - Prior to the procedure, a History and Physical was                       performed, and patient medications and allergies were                       reviewed. The patient's tolerance of previous                       anesthesia was also reviewed. The risks and benefits                       of the procedure and the sedation options and risks                       were discussed with the patient. All questions were                       answered, and informed consent was obtained. Prior                        Anticoagulants: The patient has taken Eliquis                       (apixaban), last dose was 7 days prior to procedure.                       ASA Grade Assessment: III - A patient with severe                       systemic disease. After reviewing the risks and                       benefits, the patient was deemed in satisfactory                       condition to undergo the procedure.                       After obtaining informed consent, the endoscope was                       passed under direct vision. Throughout the procedure,                       the patient's blood pressure, pulse, and oxygen                       saturations were monitored continuously. The                       Endoscope was introduced through the mouth, and                       advanced to the second part of duodenum. The upper GI                       endoscopy was accomplished without difficulty. The                       patient tolerated the procedure well.  Moderate Sedation:       MAC anesthesia was administered by the anesthesia team.  Total Procedure Duration: 0 hours 5 minutes 25 seconds  Findings:       The Z-line was regular and was found 46 cm from the incisors.       LA Grade B (one or more mucosal breaks greater than 5 mm, not       extending between the tops of two mucosal folds) esophagitis was       found 36 to 46 cm from the incisors.       One longitudinal, superficial clean based esophageal ulcer with no       bleeding was found 35 to 40 cm from the incisors.       The entire examined stomach was normal.       The examined duodenum was normal.  Impression:          - Z-line regular, 46 cm from the incisors.                       - LA Grade B erosive esophagitis.                       - Non-bleeding esophageal ulcer.                       - Normal stomach.                       - Normal examined duodenum.                       - No specimens collected.  Recommendation:      - Return patient to hospital  [facility] for ongoing care.                       - Resume previous diet.                       - Continue present medications.                       - Repeat EGD in 3 mo to re-assess.                       - tRBC negative and second look EGD with no signs of                       active bleeding at this time.                       - No absolute contraindicaiton to anticoagulation                       from a GI standpoint, although unclear if Eliquis                       warranted given DVT from 10/2021. Defer to primary                       service.  Procedure Code(s):   --- Professional ---                       74374, Esophagogastroduodenoscopy, flexible,                       transoral; diagnostic, including collection of                       specimen(s) by brushing or washing, when performed                       (separate procedure)  CPT copyright 2019 [employer]. All rights reserved.  The codes documented in this report are preliminary and upon  review  may be revised to meet current compliance requirements.  Attending Participation:       I personally performed the entire procedure.  Scope In: 9:48:37 AM  Scope Out: 9:54:02 AM  MD Mj Gupta MD  3/24/2022 10:03:13 AM  This report has been signed electronically by Mj Sethi MD  Number of Addenda: 0  Note Initiated On: 3/24/2022 9:40 AM  Estimated Blood Loss:       Estimated blood loss: none.  Exam End: --    Specimen Collected: 03/24/22 09:40 Last Resulted: 03/24/22 10:44   Received From: [facility]        EGD DIAGNOSTIC  Order: [id number]  [facility]  Gastrointestinal Endoscopy  Patient Name: Edwar Hemphill  Procedure Date: 6/22/2022 2:41 PM  MRN: [id number]  Account Number: [id number]  YOB: 1985  Admit Type: Outpatient  Age: 37  Room: [place] [address]  Gender: Male  Note Status: Finalized  Attending MD: Mj Sethi MD  Procedure:           Upper GI  endoscopy  Indications:         Follow-up of esophageal ulcer  Providers:           Mj Sethi MD  Patient Profile:     This is a 37 year old male. Refer to note in patient                       chart for documentation of history and physical.  Referring Physician: Savannah Au (cnp) (Referring MD)  Medicines:           Monitored Anesthesia Care  Complications:       No immediate complications.  Requesting Provider:   Procedure:           Pre-Anesthesia Assessment:                       - Prior to the procedure, a History and Physical was                       performed, and patient medications and allergies were                       reviewed. The patient's tolerance of previous                       anesthesia was also reviewed. The risks and benefits                       of the procedure and the sedation options and risks                       were discussed with the patient. All questions were                       answered, and informed consent was obtained. Prior                       Anticoagulants: The patient has taken no previous                       anticoagulant or antiplatelet agents. ASA Grade                       Assessment: III - A patient with severe systemic                       disease. After reviewing the risks and benefits, the                       patient was deemed in satisfactory condition to                       undergo the procedure.                       After obtaining informed consent, the endoscope was                       passed under direct vision. Throughout the procedure,                       the patient's blood pressure, pulse, and oxygen                       saturations were monitored continuously. The                        Endoscope was introduced through the mouth, and                       advanced to the second part of duodenum. The upper GI                       endoscopy was accomplished without difficulty. The                       patient tolerated the  procedure well.  Moderate Sedation:       MAC anesthesia was administered by the anesthesia team.  Total Procedure Duration: 0 hours 2 minutes 30 seconds  Findings:       The Z-line was regular and was found 42 cm from the incisors.       The examined esophagus was normal.       The entire examined stomach was normal.       The examined duodenum was normal.  Impression:          - Z-line regular, 42 cm from the incisors.                       - Normal esophagus.                       - Normal stomach.                       - Normal examined duodenum.                       - No specimens collected.  Recommendation:      - Discharge patient to home.                       - Resume previous diet.                       - Continue present medications.                       - Return to primary care physician.  Procedure Code(s):   --- Professional ---                       97994, Esophagogastroduodenoscopy, flexible,                       transoral; diagnostic, including collection of                       specimen(s) by brushing or washing, when performed                       (separate procedure)  CPT copyright 2019 [employer] Association. All rights reserved.  The codes documented in this report are preliminary and upon  review  may be revised to meet current compliance requirements.  Attending Participation:       I personally performed the entire procedure.  Scope In: 2:52:58 PM  Scope Out: 2:55:28 PM  MD Mj Gupta MD  6/22/2022 2:58:07 PM  This report has been signed electronically by Mj Sethi MD  Number of Addenda: 0  Note Initiated On: 6/22/2022 2:41 PM  Estimated Blood Loss:       Estimated blood loss: none.  Exam End: --    Specimen Collected: 06/22/22 14:41 Last Resulted: 06/22/22 15:00   Received From: [facility]

## 2024-07-25 NOTE — PROGRESS NOTES
"Subjective   NAOE. Pt underwent POLI yesterday. The results of this were discussed with the patient and all questions were answered. He is without acute complaints today.      Objective   Visit Vitals  BP 94/66   Pulse 91   Temp 37.1 °C (98.8 °F) (Temporal)   Resp 15   Ht 1.753 m (5' 9.02\")   Wt 75.4 kg (166 lb 3.6 oz)   SpO2 96%   BMI 24.54 kg/m²   Smoking Status Every Day   BSA 1.92 m²      Physical Exam  General: awake, alert, no acute distress  HEENT: no scleral icterus, JVD difficult to assess  CV: RRR, systolic murmur present  Resp: CTA b/l, no wheezes, rales, or rhonchi  Abd: soft, NT/ND  US: RUE amputation  LE: no edema, no cyanosis, R femoral TDC in place  Neuo: grossly normal  Psych: pleasant      Cardiographics  Echocardiogram:   POLI 7/24:  1. Left ventricular ejection fraction is moderately decreased, by visual estimate at 40%.  2. There is global hypokinesis of the left ventricle with minor regional variations.  3. Mildly enlarged right ventricle.  4. There is moderately reduced right ventricular systolic function.  5. Agitated saline contrast study was positive for an intracardiac shunt (moderate sized PFO).  6. There is a large vegetation that circumferentially encases the femoral TDC ( catheter). There is also involvement of the TV with likely destruction of the septal leaflet of the TV and resultant severe TR. ( the vegatation surrounding the catheter measures at least 1.9cm x 2.5cm.  7. The tricuspid valve is abnormal.  8. Severe tricuspid regurgitation visualized.  9. Trielaflet AV with no paravalular abscess or AI, however there is a small mobile echodensity on the LVOT side of the AV with independent motion whcih could be consitent with a small vegetation vs degenerative changes.  10. Moderately sized patent foramen ovale.  11. No left atrial thrombus.  12. MICU team nofitied of findings at the time of study.  13. A bubble study using agitated saline was performed. Bubble study is positive. A " moderate PFO (11-20 bubbles) was demonstrated.  14. Compared with study dated 7/23/2024, Catheter associated vegetation and anatomy of TV are better visualized on today's study. Otherwise, there are no gross changes from the previous surface echo.    Lab Review   Lab Results   Component Value Date     (L) 07/25/2024    K 3.6 07/25/2024    CL 98 07/25/2024    CO2 25 07/25/2024    BUN 28 (H) 07/25/2024    CREATININE 3.81 (H) 07/25/2024    GLUCOSE 157 (H) 07/25/2024    CALCIUM 7.9 (L) 07/25/2024     Lab Results   Component Value Date    WBC 14.8 (H) 07/25/2024    HGB 7.6 (L) 07/25/2024    HCT 22.0 (L) 07/25/2024    MCV 82 07/25/2024    PLT 54 (L) 07/25/2024       Assessment/Plan   39M w/ ESRD (MWF via femoral TDC), NICM/HFrEF (LVEF 35-40% on 05/24 POLI), prior MV IE (2020), T2D, RUE amputation, HTN, PUD who presented to the ED from dialysis on 7/23 for hypotension and was found to be in septic shock w/ MRSA bacteremia and was admitted to the MICU for pressor support. He was found to have vegetations associated with his femoral TDC with extension to the TV and destruction of the septal leaflet with resultant severe TR. He is currently being evaluated by cardiac surgery for potential valve replacement.      #Septic Shock i/s/o MRSA bacteremia  #Native valve IE of the TV  #TDC associated vegetation  -Continue abx management per ID and supportive care per MICU team  -Await further recommendations from cardiac surgery     #Paroxysmal Afib  - currently in NSR   - per vascular medicine consult would recommend getting GI onboard prior to restarting AC      Thank you for the opportunity to contribute to the care of this patient. Above recommendations discussed with Dr. Latif. If further questions arise, please page the [place] cardi[place] [phone] on weekdays 7AM - 6PM and weekends 7AM - 2PM, or at [phone] at all other times.

## 2024-07-25 NOTE — PROGRESS NOTES
Edwar Hemphill   39 lucy    @WT@  MRN/Room: [id number]/24/24-A    Subjective:   Patient is seen and examined on CVVH. No acute event overnight.    Objective:     Meds:   atorvastatin, 40 mg, Nightly  B complex-vitamin C-folic acid, 1 capsule, Daily  ceftaroline, 400 mg, q8h  cholecalciferol, 50,000 Units, Once per day on Monday Thursday  epoetin tyson or biosimilar, 10,000 Units, Once per day on Monday Wednesday Friday  folic acid, 1 mg, Daily  insulin glargine, 2 Units, Nightly  insulin lispro, 0-5 Units, q4h  levothyroxine, 125 mcg, Daily before breakfast  melatonin, 6 mg, Nightly  midodrine, 10 mg, q8h  nystatin, 1 Application, BID  pantoprazole, 40 mg, Daily before breakfast  thiamine, 200 mg, Daily  vancomycin, 500 mg, q12h      norepinephrine, Last Rate: Stopped (07/25/24 1030)  PrismaSol 4/2.5, Last Rate: 25 mL/kg/hr (07/24/24 2334)      acetaminophen, 650 mg, q4h PRN   Or  acetaminophen, 650 mg, q4h PRN   Or  acetaminophen, 650 mg, q4h PRN  benzocaine, , PRN  dextrose, 12.5 g, q15 min PRN  dextrose, 25 g, q15 min PRN  diphenhydrAMINE, 25 mg, q6h PRN  fentaNYL, , PRN  glucagon, 1 mg, q15 min PRN  glucagon, 1 mg, q15 min PRN  lidocaine, , 4x daily PRN  midazolam, , PRN  vancomycin, , Daily PRN        Vitals:    07/25/24 1150   BP:    Pulse:    Resp:    Temp: 35.5 °C (95.9 °F)   SpO2:           Intake/Output Summary (Last 24 hours) at 7/25/2024 1214  Last data filed at 7/25/2024 0800  Gross per 24 hour   Intake 1403.72 ml   Output 272 ml   Net 1131.72 ml       General appearance: no distress  Eyes: non-icteric  Skin: no apparent rash  Heart: RRR  Lungs: CTA bilat no wheezing/crackles  Abdomen: soft, nt/nd  Extremities: no edema bilat  Access Left femoral TDC    Blood Labs:  Results for orders placed or performed during the hospital encounter of 07/22/24 (from the past 24 hour(s))   CBC and Auto Differential   Result Value Ref Range    WBC 15.5 (H) 4.4 - 11.3 x10*3/uL    nRBC 0.0 0.0 - 0.0 /100 WBCs    RBC 2.32 (L)  4.50 - 5.90 x10*6/uL    Hemoglobin 6.6 (L) 13.5 - 17.5 g/dL    Hematocrit 18.3 (L) 41.0 - 52.0 %    MCV 79 (L) 80 - 100 fL    MCH 28.4 26.0 - 34.0 pg    MCHC 36.1 (H) 32.0 - 36.0 g/dL    RDW 17.3 (H) 11.5 - 14.5 %    Platelets 74 (L) 150 - 450 x10*3/uL    Neutrophils % 87.5 40.0 - 80.0 %    Immature Granulocytes %, Automated 1.5 (H) 0.0 - 0.9 %    Lymphocytes % 4.9 13.0 - 44.0 %    Monocytes % 5.5 2.0 - 10.0 %    Eosinophils % 0.3 0.0 - 6.0 %    Basophils % 0.3 0.0 - 2.0 %    Neutrophils Absolute 13.54 (H) 1.20 - 7.70 x10*3/uL    Immature Granulocytes Absolute, Automated 0.23 0.00 - 0.70 x10*3/uL    Lymphocytes Absolute 0.76 (L) 1.20 - 4.80 x10*3/uL    Monocytes Absolute 0.85 0.10 - 1.00 x10*3/uL    Eosinophils Absolute 0.04 0.00 - 0.70 x10*3/uL    Basophils Absolute 0.04 0.00 - 0.10 x10*3/uL   POCT GLUCOSE   Result Value Ref Range    POCT Glucose 132 (H) 74 - 99 mg/dL   Prepare RBC: 1 Units, Leukocytes Reduced (CMV reduced risk)   Result Value Ref Range    PRODUCT CODE M6008V62     Unit Number [id number]     Unit ABO O     Unit RH POS     XM INTEP COMP     Dispense Status TR     Blood Expiration Date 8/19/2024 11:59:00 PM EDT     PRODUCT BLOOD TYPE 5100     UNIT VOLUME 282    Transesophageal Echo (POLI)   Result Value Ref Range    LV EF 40 %   POCT GLUCOSE   Result Value Ref Range    POCT Glucose 161 (H) 74 - 99 mg/dL   POCT GLUCOSE   Result Value Ref Range    POCT Glucose 155 (H) 74 - 99 mg/dL   CBC and Auto Differential   Result Value Ref Range    WBC 14.8 (H) 4.4 - 11.3 x10*3/uL    nRBC 0.0 0.0 - 0.0 /100 WBCs    RBC 2.67 (L) 4.50 - 5.90 x10*6/uL    Hemoglobin 7.6 (L) 13.5 - 17.5 g/dL    Hematocrit 22.0 (L) 41.0 - 52.0 %    MCV 82 80 - 100 fL    MCH 28.5 26.0 - 34.0 pg    MCHC 34.5 32.0 - 36.0 g/dL    RDW 17.3 (H) 11.5 - 14.5 %    Platelets 54 (L) 150 - 450 x10*3/uL    Neutrophils % 82.2 40.0 - 80.0 %    Immature Granulocytes %, Automated 2.0 (H) 0.0 - 0.9 %    Lymphocytes % 7.2 13.0 - 44.0 %    Monocytes %  7.7 2.0 - 10.0 %    Eosinophils % 0.6 0.0 - 6.0 %    Basophils % 0.3 0.0 - 2.0 %    Neutrophils Absolute 12.15 (H) 1.20 - 7.70 x10*3/uL    Immature Granulocytes Absolute, Automated 0.30 0.00 - 0.70 x10*3/uL    Lymphocytes Absolute 1.06 (L) 1.20 - 4.80 x10*3/uL    Monocytes Absolute 1.13 (H) 0.10 - 1.00 x10*3/uL    Eosinophils Absolute 0.09 0.00 - 0.70 x10*3/uL    Basophils Absolute 0.04 0.00 - 0.10 x10*3/uL   Comprehensive Metabolic Panel   Result Value Ref Range    Glucose 157 (H) 74 - 99 mg/dL    Sodium 131 (L) 136 - 145 mmol/L    Potassium 3.6 3.5 - 5.3 mmol/L    Chloride 98 98 - 107 mmol/L    Bicarbonate 25 21 - 32 mmol/L    Anion Gap 12 10 - 20 mmol/L    Urea Nitrogen 28 (H) 6 - 23 mg/dL    Creatinine 3.81 (H) 0.50 - 1.30 mg/dL    eGFR 20 (L) >60 mL/min/1.73m*2    Calcium 7.9 (L) 8.6 - 10.6 mg/dL    Albumin 2.2 (L) 3.4 - 5.0 g/dL    Alkaline Phosphatase 113 33 - 120 U/L    Total Protein 5.5 (L) 6.4 - 8.2 g/dL    AST 8 (L) 9 - 39 U/L    Bilirubin, Total 1.1 0.0 - 1.2 mg/dL    ALT 3 (L) 10 - 52 U/L   Magnesium   Result Value Ref Range    Magnesium 1.95 1.60 - 2.40 mg/dL   Phosphorus   Result Value Ref Range    Phosphorus 3.1 2.5 - 4.9 mg/dL   Calcium, ionized   Result Value Ref Range    POCT Calcium, Ionized 1.14 1.1 - 1.33 mmol/L   POCT GLUCOSE   Result Value Ref Range    POCT Glucose 152 (H) 74 - 99 mg/dL   POCT GLUCOSE   Result Value Ref Range    POCT Glucose 152 (H) 74 - 99 mg/dL   POCT GLUCOSE   Result Value Ref Range    POCT Glucose 128 (H) 74 - 99 mg/dL              ASSESSMENT:  Edwar Hemphill is a  39 y.o.  Year old , with PMHx of ESRD on HD (MWF, right femoral line), HFrEF (EF 35-40% 5/2024), T2DM, PVD (s/p left AKA and right forearm amputation), multiple line-related DVT (RIJ, LIJ, RUE, LUE, RLE, LLE; on Eliquis prior to recent upper GI bleed 6/15) presenting from [facility] due to hypotension prior to dialysis. Admitted to MICU for septic shock. Nephrology consulted for dialysis needs.      #ESRD on  HD MWF via R femoral TDC  #Hx of multiple HD catheter infections, mrsa bacteremia   -HD at [facility], nephrologist Dr. Licona - last HD 7/19  Started on CVVH 7/23 due to hemodynamic instability.     #Septic shock 2/2 staph aureus bacteremia  #TV infective endocarditis   -vancomycin  -levophed      #MBD   - Renelva discontinued on 07/24    #Anemia   - epo 10,000IU thrice weekly         RECOMMENDATIONS:  -plan to continue CVVH. Recommend TDC removal given endocarditis, TDC vegetation. Will continue cvvh until he has tunneled line removed  -Recommend fluid removal to maintain net even I/O fluid balance   - Supportive treatment as per ICU team.        Isis Marvin MD  Nephrology Fellow   Daytime / Weekend Renal Pager [phone]  After 7 pm Emergencies [phone] Pager [phone]

## 2024-07-25 NOTE — HOSPITAL COURSE
Edwar Hemphill is a 39 y.o. male with PMHx significant for ESRD (MWF  via right femoral line), NICM,  HFrEF (LVEF 35-40% on 5/2024 POLI), anemia, DM2, Left AKA, RUE amputation, HTN, PVD, GERD.  Patient presented to his dialysis session last night. and was found to be hypotensive 70s/40s, and was told to brought to the ED.  Upon presentation in the ED, /76, RR 16, afebrile, tachycardic (109) on room air.  Patient's labs revealed hyponatremia, hypokalemia at 3.1, creatinine of 6.88 (patient is ESRD) lactate 3.3, WBC 20 K, hemoglobin 7.3 (baseline), platelets 65 (baseline 180s).  Patient was also evaluated by POCUS and found to have collapsible IVC, + BC 2/4 growing GPC.  Patient was started on Vanc/ Zosyn as c/f sepsis (patient has a history of MRSA sepsis) and received 2.5 L of LR in the ED as well as started on low-dose pressors.     MICU course:  Admitted  with septic shock required pressors and infective endocarditis with large TV vegetation on TTE, w/ bcx growing GPCs with staph aureus (with history MRSA bacteremia before) and Acinetobacter baumanni positive on his wound culture. Started on vanc/zosyn, IVFresuscitated, ID, cardiology and vascular medicine consulted. TTE showed TV vegetation and possible source could be femoral catheter with attached vegetations.  Cardiology consulted and advised for POLI. Patient continued on CVVH based on nephrology consult (plan was to continue dialysis before we take the line out). He had a drop in his plts from 65 >37 s/p 2 units plts before POLI procedure and plts uptrended to 57. Zosyn changed to ceftaroline on 7/23 and continued with vancomycin. POLI was done on 7/24 and showed, a large vegetation that circumferentially encases the femoral TDC ( catheter) and presented PFO. We were not able to take off the line as a source of infection given his DVTs in subclavian, LIJ, b/l innominate occlusion and complete occlusion of intrahepatic IVC its hard to find another access at  this time. IR, cardiology, ID, Vascular medicine were on board in terms of decision for taking this line off. Because of risk of shedding from the attached veg planned to take this line off with surgery.     Patient was complaining of wrist pain and ID recommended with MRI and synovial tap given his history of osteomyelitis with MRSA. MRI was unremarkable, synovial tap was done and showed less than wbc and culture is still pending.   GI consulted given his history of GIB in June and drop in his Hb in terms of starting anticoagulation. Per GI risk of bleeding is less than thrombosis and they referred the decision of anticoagulation therapy to primary team. Given his HB drop and low plts we held on anticoagulation for now.   Cardiac surgery consulted and they recommended CTA and consult with endovascular team for possible vagectomy, given his multiple thrombi in SVC, IVC and difficulties on getting access for bypass.  CTA showed severely attenuated superior vena cava, bilateral brachiocephalic, subclavian and internal jugular veins with the calcifications in the brachiocephalic veins with extensive collaterals in the chest wall likely representing chronic thrombosis. Endovascular consulted and they tentatively planning for vegectomy with Dr. Dennis Monday 7/29. Vegectomy aborted due to to concerns for infected thrombus in the IVC and potential incomplete treatment without removal of the TDC.      Synovial fluid growing MRSA.  Unable to tolerate HD and required SLED and low dose levo.  Patient weaned of pressors; continue with Midodrine.  Cultures from 7/29 (blood) negative.  Vasc med consulted for homegoing recommendations iso recent GIB and PMHx of DVTs.  Pt completed course of ceftaroline (7/23-8/6); per ID will continue with Vanco for 6-8 weeks (end date 9/13/2024).  CT head without evidence of intracranial mass or extra-axial collection. Patient tolerated iHD on 8/7 and transferred to SDU as need to utilizing HD  cath venous (blue) port for Heparin infusion initiated on 7/31 due to limited IV access.       SDU course:   In SDU, Midodrine decreased from 15mg Q8hrs to 10mg BID during day and 15mg HS.  Patient developed euvolemic DKA on 8/11, resolved, now hypoglycemia d/t poor appetite.  Vascular medicine following; continued on heparin drip and transitioned to Coumadin with INR therapeutic x 2 on 8/11 (2.3) and 8/12 (3.0); Heparin discontinued on 8/12.  Continue vancomycin post-HD through end date 9/13; instill vanc into HD cath after each HD session.  Will trial indefinite suppression with Bactrim 1SS daily after vancomycin course per ID.  Will need f/up with ID (Dr Doroteo Graham on 9/10).    IR consulted on 8/13 to evaluate if right femoral HD catheter can be exchanged for one with a pigtail or if a tunnelled PICC line could be placed next to it. Current Right common femoral vein tunneled dialysis catheter placed in IR on 5/24/2024.   IR not able to exchange current dialysis line as risk too high that they may not be able to reestablish access.  8/14 seen by Ortho hand, unsuccessful aspiration attempt of left wrist. This is likely a  hematoma given no purulent drainage.  Plan to keep elevated to help with swelling.    Due to hypotension midodrine increased back to 15mg TID.   Partial session of iHD d/t hypotension.  Ultrafiltration done on 8/15-16.    Given poor overall prognosis GOIC discussion held and pt was made DNAR/DNI.  Hospice was consulted     8/17 patient met with Hospice, not wishing to pursue hospice at this time.     8/19 Patient underwent iHD at bedside and tolerated well from BP standpoint.  Plan to transfer to [facility].  [place]charge plan [place] once IV atb coordinated with  KAMILA LEÓN and [facility] Nephrologist.  Dr. Mckeon is medical director at [facility] [place] office [phone] cell [phone]. [facility] MD needs to speak with nephrology or ID before accepting pt with Vanco.  [facility] will  need a physician and fax number for results of titrating.      MICRO:   - Blood cx + MRSA On 7/22 & 7/25  - Blood cx + Staph Haemolyticus; Synovial fluid from Left wrist MRSA, on  7/27  - Blood cx negative on 7/29 and 7/31 (final)  - Fungal cx (tissue) pending 7/29, Bacterial cx neg     Antibiotic:   - Vancomycin post-HD through end date 9/13     General Medicine Floor Course:  Patient arrived to the floor on 8/20/2024 in stable condition. He remained hypotensive with blood pressures ranging from 77/48 - 91/72 on midodrine 20mg. He reported no symptoms of dizziness or lightheadedness. He also had an episode of hypoglycemia overnight after which he was changed to a mild SSI. Orthopedics planned on removing patient's sutures from his left wrist on 8/21 s/p I&D on 7/29. However, the wound was dehisced and did not appear infected. Patient refused to go the OR. A bedside hematoma evacuation was done with sharp debridement of dead skin edges and placement of prevena wound vac. He was ok to be discharged from orthopedics standpoint with 1-2 week follow up outpatient with Dr. Allen.     Patient's clinical status is likely incurable given his many co-morbidities. Palliative care involved during this admission and patient denied pursuing hospice care. Patient to follow with Hospice navigator outpatient.    He will follow with infectious disease, Dr. Graham, outpatient on 9/10/2024.    Patient discharged to [facility] on 8/22/2024 in stable condition.     Appointments/Follow-Up on Discharge:   Please follow up with your primary care physician, Dr. Mauricio Estrada, in 1 week to discuss your recent hospital admission  Please follow up with Orthopeadics clinic, Dr. Allen, on 9/9/2024 at 1:15 PM on for a post-operative evaluation at [facility]   Please follow up with Vascular Medicine, Dr. Nhung Carrillo, outpatient on 10/2/2024 at 2:30 PM to discuss your warfarin dosing   Please follow up with  Infectious Disease, Dr. Barnett, outpatient on 9/10/2024 to discuss your antibiotic regimen   Please follow up with Cardiology, Dr. Carrillo, outpatient on 10/2/2024   Please follow up with Transplant, Dr. Boyd, outpatient on 10/24/2024  Please follow up with Dr. Licona (Nephrology) in outpatient hemodialysis

## 2024-07-25 NOTE — PROGRESS NOTES
CARDIAC SURGERY CONSULT PROGRESS NOTE    SUBJECTIVE  Edwar Hemphill is a 39 y.o. male withPMHx significant for ESRD (MWF via right femoral line), NICM,  HFrEF (LVEF 35-40% on 5/2024 POLI), paroxysmal A-fib, chronic anemia, DM2, Left AKA, RUE amputation, HTN, PVD, GERD, chronic smoker and multiple DVTs including b/l subclavian DVT, L IJ DVT, b/l innominate occlusion, complete occlusion of intrahepatic IVC s/p angioplasty on chronic anticoagulation that was held 4/2024 due to upper GI bleed.  Patient admitted to MICU for septic shock likely 2/2 right groin tunneled HD line with bacteremia with Staph aureus in blood. He had a TTE 7/23 which showed TV with likely mobile echo densities with severe TR that was reportedly confirmed by POLI on 7/24 (pending official read.). He remains in septic shock and there is concern for KALLIE with platelets as low as [id number]. Vascular surgery involved now in setting of infected tunneled HD line and need for alternative access as well as potential for surgical removal of tunneled line with concern for clot formation along length.     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.     Cardiac/Pertinent Imaging:    Echo: 7/24/24  CONCLUSIONS:   1. Left ventricular ejection fraction is moderately decreased, by visual estimate at 40%.   2. There is global hypokinesis of the left ventricle with minor regional variations.   3. Mildly enlarged right ventricle.   4. There is moderately reduced right ventricular systolic function.   5. Agitated saline contrast study was positive for an intracardiac shunt (moderate sized PFO).   6. There is a large vegetation that circumferentially encases the femoral TDC ( catheter). There is also involvement of the TV with likely destruction of the septal leaflet of the TV and resultant severe TR. ( the vegatation surrounding the catheter measures at least 1.9cm x 2.5cm.   7. The tricuspid valve is abnormal.   8. Severe tricuspid regurgitation  "visualized.   9. Trielaflet AV with no paravalular abscess or AI, however there is a small mobile echodensity on the LVOT side of the AV with independent motion whcih could be consitent with a small vegetation vs degenerative changes.  10. Moderately sized patent foramen ovale.  11. No left atrial thrombus.  12. MICU team nofitied of findings at the time of study.  13. A bubble study using agitated saline was performed. Bubble study is positive. A moderate PFO (11-20 bubbles) was demonstrated.  14. Compared with study dated 7/23/2024, Catheter associated vegetation and anatomy of TV are better visualized on today's study. Otherwise, there are no gross changes from the previous surface echo.        Objective   BP 77/59   Pulse 94   Temp 35.5 °C (95.9 °F) (Temporal)   Resp 14   Ht 1.753 m (5' 9.02\")   Wt 75.4 kg (166 lb 3.6 oz)   SpO2 95%   BMI 24.54 kg/m²   0-10 (Numeric) Pain Score: 0 - No pain   Vitals:    07/25/24 0500   Weight: 75.4 kg (166 lb 3.6 oz)          Intake/Output Summary (Last 24 hours) at 7/25/2024 1556  Last data filed at 7/25/2024 1200  Gross per 24 hour   Intake 715.89 ml   Output 350 ml   Net 365.89 ml         Medications  Scheduled medications  atorvastatin, 40 mg, oral, Nightly  B complex-vitamin C-folic acid, 1 capsule, oral, Daily  ceftaroline, 400 mg, intravenous, q8h  cholecalciferol, 50,000 Units, oral, Once per day on Monday Thursday  epoetin tyson or biosimilar, 10,000 Units, subcutaneous, Once per day on Monday Wednesday Friday  folic acid, 1 mg, oral, Daily  insulin glargine, 2 Units, subcutaneous, Nightly  insulin lispro, 0-5 Units, subcutaneous, q4h  levothyroxine, 125 mcg, oral, Daily before breakfast  melatonin, 6 mg, oral, Nightly  midodrine, 10 mg, oral, q8h  nystatin, 1 Application, Topical, BID  pantoprazole, 40 mg, oral, Daily before breakfast  thiamine, 200 mg, oral, Daily  vancomycin, 500 mg, intravenous, q12h    Continuous medications  norepinephrine, 0.01-1 " mcg/kg/min, Last Rate: Stopped (07/25/24 1030)  PrismaSol 4/2.5, 25 mL/kg/hr, Last Rate: 25 mL/kg/hr (07/24/24 2334)    PRN medications  PRN medications: acetaminophen **OR** acetaminophen **OR** acetaminophen, benzocaine, dextrose, dextrose, diphenhydrAMINE, fentaNYL, glucagon, glucagon, lidocaine, midazolam, vancomycin    Labs  Results for orders placed or performed during the hospital encounter of 07/22/24 (from the past 24 hour(s))   Prepare RBC: 1 Units, Leukocytes Reduced (CMV reduced risk)   Result Value Ref Range    PRODUCT CODE Q4223G58     Unit Number [id number]     Unit ABO O     Unit RH POS     XM INTEP COMP     Dispense Status TR     Blood Expiration Date 8/19/2024 11:59:00 PM EDT     PRODUCT BLOOD TYPE 5100     UNIT VOLUME 282    Transesophageal Echo (POLI)   Result Value Ref Range    LV EF 40 %   POCT GLUCOSE   Result Value Ref Range    POCT Glucose 161 (H) 74 - 99 mg/dL   POCT GLUCOSE   Result Value Ref Range    POCT Glucose 155 (H) 74 - 99 mg/dL   CBC and Auto Differential   Result Value Ref Range    WBC 14.8 (H) 4.4 - 11.3 x10*3/uL    nRBC 0.0 0.0 - 0.0 /100 WBCs    RBC 2.67 (L) 4.50 - 5.90 x10*6/uL    Hemoglobin 7.6 (L) 13.5 - 17.5 g/dL    Hematocrit 22.0 (L) 41.0 - 52.0 %    MCV 82 80 - 100 fL    MCH 28.5 26.0 - 34.0 pg    MCHC 34.5 32.0 - 36.0 g/dL    RDW 17.3 (H) 11.5 - 14.5 %    Platelets 54 (L) 150 - 450 x10*3/uL    Neutrophils % 82.2 40.0 - 80.0 %    Immature Granulocytes %, Automated 2.0 (H) 0.0 - 0.9 %    Lymphocytes % 7.2 13.0 - 44.0 %    Monocytes % 7.7 2.0 - 10.0 %    Eosinophils % 0.6 0.0 - 6.0 %    Basophils % 0.3 0.0 - 2.0 %    Neutrophils Absolute 12.15 (H) 1.20 - 7.70 x10*3/uL    Immature Granulocytes Absolute, Automated 0.30 0.00 - 0.70 x10*3/uL    Lymphocytes Absolute 1.06 (L) 1.20 - 4.80 x10*3/uL    Monocytes Absolute 1.13 (H) 0.10 - 1.00 x10*3/uL    Eosinophils Absolute 0.09 0.00 - 0.70 x10*3/uL    Basophils Absolute 0.04 0.00 - 0.10 x10*3/uL   Comprehensive Metabolic Panel    Result Value Ref Range    Glucose 157 (H) 74 - 99 mg/dL    Sodium 131 (L) 136 - 145 mmol/L    Potassium 3.6 3.5 - 5.3 mmol/L    Chloride 98 98 - 107 mmol/L    Bicarbonate 25 21 - 32 mmol/L    Anion Gap 12 10 - 20 mmol/L    Urea Nitrogen 28 (H) 6 - 23 mg/dL    Creatinine 3.81 (H) 0.50 - 1.30 mg/dL    eGFR 20 (L) >60 mL/min/1.73m*2    Calcium 7.9 (L) 8.6 - 10.6 mg/dL    Albumin 2.2 (L) 3.4 - 5.0 g/dL    Alkaline Phosphatase 113 33 - 120 U/L    Total Protein 5.5 (L) 6.4 - 8.2 g/dL    AST 8 (L) 9 - 39 U/L    Bilirubin, Total 1.1 0.0 - 1.2 mg/dL    ALT 3 (L) 10 - 52 U/L   Magnesium   Result Value Ref Range    Magnesium 1.95 1.60 - 2.40 mg/dL   Phosphorus   Result Value Ref Range    Phosphorus 3.1 2.5 - 4.9 mg/dL   Calcium, ionized   Result Value Ref Range    POCT Calcium, Ionized 1.14 1.1 - 1.33 mmol/L   POCT GLUCOSE   Result Value Ref Range    POCT Glucose 152 (H) 74 - 99 mg/dL   POCT GLUCOSE   Result Value Ref Range    POCT Glucose 152 (H) 74 - 99 mg/dL   POCT GLUCOSE   Result Value Ref Range    POCT Glucose 128 (H) 74 - 99 mg/dL   Blood Culture    Specimen: Peripheral Venipuncture; Blood culture   Result Value Ref Range    Blood Culture Loaded on Instrument - Culture in progress                ASSESSMENT & PLAN  Edwar Hemphill is a 39 y.o. male withPMHx significant for ESRD (MWF  via right femoral line), NICM,  HFrEF (LVEF 35-40% on 5/2024 POLI), paroxysmal A-fib,  chronic anemia, DM2, Left AKA, RUE amputation, HTN, PVD, GERD, chronic smoker and multiple DVTs including b/l subclavian DVT, L IJ DVT, b/l innominate occlusion, complete occlusion of intrahepatic IVC s/p angioplasty on chronic anticoagulation that was held 4/2024 due to upper GI bleed.  Patient admitted to MICU for septic shock likely 2/2 right groin tunneled HD line. He had a TTE 7/23 which showed TV with likely mobile echodensities with severe TR that was reportedly confirmed by POLI on 7/24 (pending official read.). He remains in septic shock and there is  concern for KALLIE with platelets as low as 63660. Vascular surgery involved now in setting of infected tunneled HD line and need for alternative access as well as potential for surgical removal of tunneled line with concern for clot formation along length. 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        RECOMMENDATIONS/PLAN  Dr. Jones met with patient and reviewed imaging and data.  Cardiac surgery agrees with IR, please obtain a CTA chest/abdomen/pelvis to assess his vasculature to see access options.   Please also consult endovascular (Dr. Hunter) for possible transcatheter options as well.        Will continue to follow along.  Thank you for the consultation.   Patient educated and all questions answered.  Please page the cardiac surgery consult pager [phone] with any questions or changes in patient condition.    Marlene Fiore PA-C  Cardiac Surgery Consult [facility]  [facility]  Cardiac Surgery Consult Pager [phone]     7/25/2024  3:56 PM

## 2024-07-25 NOTE — PROGRESS NOTES
"Medical Intensive Care - Daily Progress Note   Subjective    Edwar Hemphill is a 39 y.o. year old male patient admitted on 7/22/2024 with following ICU needs: septic shock required pressors, infective endocarditis     Interval History:  Patient still  a wrist pain. He is laying on his bed, doesn't have chest pain, SOB, N/V or any other complaints now. P[atient had Hb drop yesterday and s/p transfusion 1 unit RBCs.     Meds    Scheduled medications  atorvastatin, 40 mg, oral, Nightly  B complex-vitamin C-folic acid, 1 capsule, oral, Daily  ceftaroline, 400 mg, intravenous, q8h  cholecalciferol, 50,000 Units, oral, Once per day on Monday Thursday  epoetin tyson or biosimilar, 10,000 Units, subcutaneous, Once per day on Monday Wednesday Friday  folic acid, 1 mg, oral, Daily  insulin glargine, 2 Units, subcutaneous, Nightly  insulin lispro, 0-5 Units, subcutaneous, q4h  levothyroxine, 125 mcg, oral, Daily before breakfast  melatonin, 6 mg, oral, Nightly  midodrine, 10 mg, oral, q8h  nystatin, 1 Application, Topical, BID  pantoprazole, 40 mg, oral, Daily before breakfast  thiamine, 200 mg, oral, Daily  vancomycin, 500 mg, intravenous, q12h      Continuous medications  norepinephrine, 0.01-1 mcg/kg/min, Last Rate: Stopped (07/25/24 1030)  PrismaSol 4/2.5, 25 mL/kg/hr, Last Rate: 25 mL/kg/hr (07/24/24 2334)      PRN medications  PRN medications: acetaminophen **OR** acetaminophen **OR** acetaminophen, benzocaine, dextrose, dextrose, diphenhydrAMINE, fentaNYL, glucagon, glucagon, lidocaine, midazolam, vancomycin     Objective    Blood pressure 86/65, pulse 94, temperature 35.5 °C (95.9 °F), temperature source Temporal, resp. rate 17, height 1.753 m (5' 9.02\"), weight 75.4 kg (166 lb 3.6 oz), SpO2 95%.     Physical Exam   Constitutional:       Appearance: Normal appearance.   HENT:      Mouth/Throat:      Mouth: Mucous membranes are moist.   Cardiovascular:      Rate and Rhythm: Tachycardia present. Diastolic murmur on LSB and " pulmonic area      Heart sounds: Normal heart sounds.   Pulmonary:      Effort: Pulmonary effort is normal.      Breath sounds: Normal breath sounds.   Abdominal:      Palpations: Abdomen is soft.   Musculoskeletal:      Comments: Left foot amputated above the knee  Right hand amputated above elbow  Posterior calcaneous wound has yellow discharge      Skin:     General: Skin is dry.   Neurological:      General: No focal deficit present.      Mental Status: He is oriented to person, place, and time.   Psychiatric:         Behavior: Behavior normal.         Thought Content: Thought content normal.         Judgment: Judgment normal.        Intake/Output Summary (Last 24 hours) at 7/25/2024 1307  Last data filed at 7/25/2024 1200  Gross per 24 hour   Intake 1394.87 ml   Output 272 ml   Net 1122.87 ml     Labs:   Results from last 72 hours   Lab Units 07/25/24  0336 07/24/24  0339 07/23/24  1809 07/23/24  0952   SODIUM mmol/L 131* 129*  129*  --  126*   POTASSIUM mmol/L 3.6 4.2  4.2  --  3.3*   CHLORIDE mmol/L 98 95*  95*  --  89*   CO2 mmol/L 25 23  23  --  23   BUN mg/dL 28* 39*  39* 47* 46*   CREATININE mg/dL 3.81* 5.50*  5.50* 6.99* 6.99*   GLUCOSE mg/dL 157* 59*  --  300*   CALCIUM mg/dL 7.9* 8.2*  --  8.4*   ANION GAP mmol/L 12 15  15  --  17   EGFR mL/min/1.73m*2 20* 13*  13* 9* 9*   PHOSPHORUS mg/dL 3.1 3.2  --  3.5      Results from last 72 hours   Lab Units 07/25/24  0336 07/24/24  1522 07/24/24  0339 07/23/24  0518   WBC AUTO x10*3/uL 14.8* 15.5* 13.8* 16.3*   HEMOGLOBIN g/dL 7.6* 6.6* 7.5* 7.6*   HEMATOCRIT % 22.0* 18.3* 21.1* 20.3*   PLATELETS AUTO x10*3/uL 54* 74* 27* 50*   NEUTROS PCT AUTO % 82.2 87.5  --  86.0   LYMPHO PCT MAN %  --   --  7.8  --    LYMPHS PCT AUTO % 7.2 4.9  --  5.4   MONO PCT MAN %  --   --  1.7  --    MONOS PCT AUTO % 7.7 5.5  --  7.0   EOSINO PCT MAN %  --   --  0.0  --    EOS PCT AUTO % 0.6 0.3  --  0.2        Micro/ID:     Lab Results   Component Value Date    BLOODCULT  Methicillin Resistant Staphylococcus aureus (MRSA) (AA) 07/22/2024    BLOODCULT Staphylococcus aureus (AA) 07/22/2024       Summary of key imaging results from the last 24 hours   US lower extremities 7/24/24:   Right Lower Venous: Unable to obtain right groin compression or visualize the DEIV, CFV, profunda and proximal FVs due to line placement. Can not rule out thrombus in non-visualized areas. Remainder visualized vessels show no evidence of DVT.  Left Lower Venous: There are chronic changes visualized in the common femoral and proximal femoral veins.    POLI on 7/24/24:   1. Left ventricular ejection fraction is moderately decreased, by visual estimate at 40%.   2. There is global hypokinesis of the left ventricle with minor regional variations.   3. Mildly enlarged right ventricle.   4. There is moderately reduced right ventricular systolic function.   5. Agitated saline contrast study was positive for an intracardiac shunt (moderate sized PFO).   6. There is a large vegetation that circumferentially encases the femoral TDC ( catheter). There is also involvement of the TV with likely destruction of the septal leaflet of the TV and resultant severe TR. ( the vegatation surrounding the catheter measures at least 1.9cm x 2.5cm.   7. The tricuspid valve is abnormal.   8. Severe tricuspid regurgitation visualized.   9. Trielaflet AV with no paravalular abscess or AI, however there is a small mobile echodensity on the LVOT side of the AV with independent motion whcih could be consitent with a small vegetation vs degenerative changes.  10. Moderately sized patent foramen ovale.  11. No left atrial thrombus.  12. MICU team nofitied of findings at the time of study.  13. A bubble study using agitated saline was performed. Bubble study is positive. A moderate PFO (11-20 bubbles) was demonstrated.  14. Compared with study dated 7/23/2024, Catheter associated vegetation and anatomy of TV are better visualized on today's  study. Otherwise, there are no gross changes from the previous surface echo.        Assessment and Plan     Assessment:  Edwar Hemphill is a 39 y.o. male with PMHx significant for ESRD (MWF  via right femoral line), NICM,  HFrEF (LVEF 35-40% on 5/2024 POLI), anemia, DM2, Left AKA, RUE amputation, HTN, PVD, GERD.  Admitted  with septic shock required pressors and infective endocarditis with large TV vegetation, w/ bcx growing GPCs with staph aureus ( with history MRSA bacteremia before). Started on vanc/zosyn, fluid resuscitated, ID, cardiology and vascular medicine consulted. On TTE showed TV vegetation and possible source could be femoral catheter with attached vegetations, continued on CVVH based on nephrology consult. Changed zosyn to ceftaroline on 7/23. We are not able to take off the line as a source of infection given his DVTs in subclavian, LIJ, b/l innominate occlusion and complete occlusion of intrahepatic IVC its hard to find another access at this time. IR, cardiology, ID, Vascular medicine are on board. he also had  Acinetobacter baumanni positive on his wound culture. Continued on CVVH yesterday and POLI has done, which showed There is a large vegetation that circumferentially encases the femoral TDC ( catheter). Cardiac surgery consulted and they advised based on his critical illness and need for further optimization no indication for emergent surgery at this time. GI consulted given his history of GIB in June and drop in his Hb in terms of starting anticoagulation.          Mechanical Ventilation: none  Sedation/Analgesia:  none  Restraints: no     Summary for 07/25/24  :  IR consulted for possible second plan for line access   POLI has done   Patient is received 2 units plts on 7/24/24  Vascular medicine recommended Heparin on hold, Recommended bivalirudin however not started due to acute drop in his hemoglobin requiring 1 unit PRBC transfusion yesterday  PF4 activity and serotonin assay pending    Ceftaroline started on 7/23 and Vancomycin started on 7/22 and plan to continue with repeat BC   Patient had wrist pain, MR wrist to R/O osteomyelitis   Talked with cardiac surgery, pending recs  palliative care consulted and patient said he wants to receive treatments  FU with GI     Plan:  NEUROLOGY/PSYCH:  #hx of leaving AMA multiple times     CARDIOVASCULAR:  #septic shock  #infective endocarditis  - on levo running peripherally, increased to 0.08 yesterday, now on 0.02    -POLI showed large vegetation on TV  - 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   -positive staph aureus bacteremia   - Ceftaroline started on 7/23 and Vancomycin started on 7/22   -palliative care consulted and patient said he wants to receive treatments        Plan:  -continue on levo   -FU with cardia surgery about if he is considering for surgery  -c/w ceftaroline and vancomycin   -repeat BC today   -palliative involved,         #HFrecEF   - last EF 39% (lowest EF 26%)   - Presumed ICM          # hx of paroxysmal Afib  #Multiple line-related DVTs   - CHADSVASC 6 (DM, PAD, TIA, HTN, HF)   - b/l subclavian DVT, L IJ DVT, b/l innominate occlusion, complete occlusion of intrahepatic IVC s/p angioplasty   - priorly on Eliquis however recent GI bleed 6/15 at [facility] , eliquis was held and patient was planned for heparin trial but left ama prior to trial   - not a candidate for IVC filter given femoral TDC   - last DVT US w/ resolution of RLE DVT   -discontinued heparin due to low plts and concerned for HIT     Plan:  -FU with PF4 activity and serotonin assay to plan on anticoagulation therapy   -FU with vascular medicine         PULMONARY:  NAVI  CXR at      RENAL/GENITOURINARY:  #ESRD MWF thru right femoral line   -  iHD TTS since 2016, anuric   - RUE AVG c/b infection s/p RUE amputation above elbow 2021   - multiple TDCs (including iliolumbar)   - femoral TDC recently placed at  (14.5 Macedonian and 42 cm long)  - lokelma  5 MWF Held iso of hypokalemia  - home sevelmer held  Plan   -FU with nephrology   -continue on CVVH now           GASTROENTEROLOGY:  #Esophagitis   #GERD  ::EGD on 6/8 clipped two foci of active esophageal bleeding   Plan:  -cw home PPI        ENDOCRINOLOGY:  #hypothyroidism  -last TSH 1.9  Plan:  -cw home levo 125     #T1DM  -home regiment : 8u glargine, SSI  -Hba1c 7.3  Plan:   -home regiment insulin 8u glargine  -ordered 4 unites glargine and SSI #2     HEMATOLOGY:  #Chronic anemia  #Anemia of chronic disease  -recent drop in Hb to 6.6   -s/p transfusion of 1 Unit RBCs  Plan:  - c/w home epo m/w/f  -trend cbc   -if Hb<7 transfuse      #Thrombocytopenia  -low concern for KALLIE now, possibly due to active infection   -PF4 activity and serotonin assay sent   -Fibrinogen 201, INR 1.4, aPTT 34, PT 15  -s/p 2 units plt transfusion   Plan:  -FU on PF4 and serotonin assay   -transfuse 2 units plts        MUSCULOSKELETAL/ SKIN:  NAVI     INFECTIOUS DISEASE:  #Sepsis with line as Likely  source  #endocarditis   - repeat bcx today   -cx vanc/ceftaroline  - has previously needed double coverage for 4 weeks with van/dapto to cover infection. But developed lung toxicity 2/2 dapto. And switched to linezolid.  Plan:  - discuss with cardiac sx in terms of sx plan   -after find a second line can consider removing femoral line as a source of inf  -FU with ID             ICU Check List      FEN:  Fluids: restricted   Electrolytes: k>4 mg>2 caution as ESRD  Nutrition: renal diet  DVT ppx: none  GI ppx: PPI  Bowel care: Miralax prn  Access:  PIV, Femoral line        Social:  Code: Full Code    NOK:   Extended Emergency Contact Information  Primary Emergency Contact: JobyShanthi  Mobile Phone: [phone]  Relation: Parent  Secondary Emergency Contact: Terra Scott  Mobile Phone: [phone]      Tucker Irwin MD   07/25/24 at 1:07 PM     Disclaimer: Documentation completed with the information available at the time of input.  The times in the chart may not be reflective of actual patient care times, interventions, or procedures. Documentation occurs after the physical care of the patient.

## 2024-07-25 NOTE — CARE PLAN
Problem: Skin  Goal: Decreased wound size/increased tissue granulation at next dressing change  Outcome: Progressing  Flowsheets (Taken 7/25/2024 0841)  Decreased wound size/increased tissue granulation at next dressing change: Protective dressings over bony prominences  Goal: Participates in plan/prevention/treatment measures  Outcome: Progressing  Flowsheets (Taken 7/25/2024 0841)  Participates in plan/prevention/treatment measures: Elevate heels  Goal: Prevent/manage excess moisture  Outcome: Progressing  Flowsheets (Taken 7/25/2024 0841)  Prevent/manage excess moisture:   Follow provider orders for dressing changes   Cleanse incontinence/protect with barrier cream   Moisturize dry skin  Goal: Prevent/minimize sheer/friction injuries  Outcome: Progressing  Flowsheets (Taken 7/25/2024 0841)  Prevent/minimize sheer/friction injuries:   HOB 30 degrees or less   Complete micro-shifts as needed if patient unable. Adjust patient position to relieve pressure points, not a full turn   Turn/reposition every 2 hours/use positioning/transfer devices   Utilize specialty bed per algorithm  Goal: Promote/optimize nutrition  Outcome: Progressing  Flowsheets (Taken 7/25/2024 0841)  Promote/optimize nutrition:   Discuss with provider if NPO > 2 days   Monitor/record intake including meals  Goal: Promote skin healing  Outcome: Progressing  Flowsheets (Taken 7/25/2024 0841)  Promote skin healing:   Assess skin/pad under line(s)/device(s)   Protective dressings over bony prominences   Turn/reposition every 2 hours/use positioning/transfer devices   Rotate device position/do not position patient on device   Ensure correct size (line/device) and apply per  instructions

## 2024-07-25 NOTE — CONSULTS
Inpatient consult to Palliative Care  Consult performed by: AMARILYS Blair-CNP  Consult ordered by: Dayton Young MD    Palliative Medicine Consult  Complex medical decision making, symptom management, patient/family support    History obtained from chart review including ED note, H&P, patient's daily progress notes, review of lab/test results, and discussion with primary team and bedside RN.    Subjective    History of Present Illness  Edwar Hemphill is a 39 y.o. 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 GI is consulted for beginning anticoagulation with hx of UGI bleed. IR, ID, Cardiology, and Vascular Medicine all consulted and on board. Patient being treated for septic shock requiring pressors (Levophed) initially and infective endocarditis. Patient had a TTE revealing EF 40%, global hypokinesis of LV, enlarged RV and large vegetation circumferentially encasing the femoral TDC. Pt is currently on CVVH. Palliative Medicine was consulted today for GOC.   Introduction to Palliative Care  Met with patient and patient's mother Shanthi Hemphill at bedside.   Patient alert and oriented, has capacity to make their own medical decisions at this time.   Staff present: Kyleigh Campa Palliative Medicine MARGUERITE and Veronica Ga   Palliative Medicine was introduced as a specialty service for patients with serious illness to help with symptom management, improve quality of life, assist with goals of care conversations, navigate complex decision making, and provide support to patients and families. Support and empathy was provided throughout the encounter. Provided reflective listening and presence.     Symptoms  [place] Symptom Assessment System  0-10  (Numeric) Pain Score: 0 - No pain  Pain: Pt denies.  Dyspnea: Pt denies.  Fatigue: Pt reports severe.  Insomnia: Pt denies.  Drowsiness: Pt denies.  Constipation: Pt denies.  Nausea: Pt denies.  Appetite: Pt denies decreased and reports good appetite. Pt verbalized frustration with NPO status and awaiting procedure or testing and not allowed to eat and drink yet.   Anxiety: Pt denies.  Depression: Pt denies.    BM in last 48 hours? yes    Palliative Medicine Social History:  The patient is . No children. The patient lives in [facility] facility [facility]. The patient has been on disabled for years and on hemodialysis for approximately 10 years. Pt denies use of alcohol, tobacco or drugs. The pt's mobility requires assistive device power wheelchair (powerchair/scooter). The patient spends most of the day. Pt sees their PCP and other specialists regularly. Hobbies were cooking breakfast for his mom, cooking, living life fully and having complete independence, but since illness has progressed, pt is no longer able. For enjoyment, the pt loves listening to music and riding around in his powerchair/scooter. Pt highly values his life and living fully. Coping methods are his nani and strong connection with God and family support. Denies any safety concerns.    Spiritual History:   Are you spiritual or Holiness? Yes  What’s your nani background? Strong Nani and believes in God, has a very personal relationship with God, no particular organized Uatsdin  During difficult times in your life, what have you relied on for strength? Nani and Family    Music History:  Do you enjoy music? Yes What type of music do you enjoy? All kinds, especially R&B, pt is  and cha, pt loves music and very important part of his life and what brings him andry    Personal/Social History  He reports that he has been smoking cigarettes. He has a 3.8 pack-year smoking history. He has never used smokeless tobacco. No history on  "file for alcohol use and drug use.    Past Medical History  He has a past medical history of Chronic kidney disease, Diabetes mellitus (Multi), ESRD (end stage renal disease) (Multi), Essential (primary) hypertension (05/26/2017), GERD (gastroesophageal reflux disease), Hyperlipidemia, and Hypothyroidism.    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     Family History  Family History   Problem Relation Name Age of Onset    Other (DIABETES DUE TO UNDERLYING CONDITION WITH MICROALBUMINURIA) Father      Other (DIABETES DUE TO UNDERLYING CONDITION WITH MICROALBUMINURIA [Other]) Other AUNT     Other (DIABETES DUE TO UNDERLYING CONDITION WITH MICROALBUMINURIA [Other]) Other GP      Allergies  Cashew nut and Daptomycin    Objective    Last Recorded Vitals  BP 77/59   Pulse 94   Temp 35.5 °C (95.9 °F) (Temporal)   Resp 14   Ht 1.753 m (5' 9.02\")   Wt 75.4 kg (166 lb 3.6 oz)   SpO2 95%   BMI 24.54 kg/m²      Physical Exam  Vitals and nursing note reviewed.   Constitutional:       General: He is not in acute distress.     Appearance: He is ill-appearing. He is not toxic-appearing.   HENT:      Head: Normocephalic and atraumatic.      Right Ear: Decreased hearing noted.      Left Ear: Decreased hearing noted.      Mouth/Throat:      Mouth: Mucous membranes are moist.   Eyes:      General: No scleral icterus.  Cardiovascular:      Rate and Rhythm: Normal rate and regular rhythm.   Abdominal:      General: There is no distension.      Palpations: " Abdomen is soft.   Musculoskeletal:      Comments: Left leg amputated above the knee (Left AKA)  Right arm amputated above elbow    Skin:     General: Skin is warm and dry.   Neurological:      Mental Status: He is alert and oriented to person, place, and time.   Psychiatric:         Speech: Speech normal.         Behavior: Behavior is cooperative.         Cognition and Memory: Cognition and memory normal.     Relevant Results  Results for orders placed or performed during the hospital encounter of 07/22/24 (from the past 24 hour(s))   POCT GLUCOSE   Result Value Ref Range    POCT Glucose 132 (H) 74 - 99 mg/dL   Prepare RBC: 1 Units, Leukocytes Reduced (CMV reduced risk)   Result Value Ref Range    PRODUCT CODE I4227Z79     Unit Number [id number]     Unit ABO O     Unit RH POS     XM INTEP COMP     Dispense Status TR     Blood Expiration Date 8/19/2024 11:59:00 PM EDT     PRODUCT BLOOD TYPE 5100     UNIT VOLUME 282    Transesophageal Echo (POLI)   Result Value Ref Range    LV EF 40 %   POCT GLUCOSE   Result Value Ref Range    POCT Glucose 161 (H) 74 - 99 mg/dL   POCT GLUCOSE   Result Value Ref Range    POCT Glucose 155 (H) 74 - 99 mg/dL   CBC and Auto Differential   Result Value Ref Range    WBC 14.8 (H) 4.4 - 11.3 x10*3/uL    nRBC 0.0 0.0 - 0.0 /100 WBCs    RBC 2.67 (L) 4.50 - 5.90 x10*6/uL    Hemoglobin 7.6 (L) 13.5 - 17.5 g/dL    Hematocrit 22.0 (L) 41.0 - 52.0 %    MCV 82 80 - 100 fL    MCH 28.5 26.0 - 34.0 pg    MCHC 34.5 32.0 - 36.0 g/dL    RDW 17.3 (H) 11.5 - 14.5 %    Platelets 54 (L) 150 - 450 x10*3/uL    Neutrophils % 82.2 40.0 - 80.0 %    Immature Granulocytes %, Automated 2.0 (H) 0.0 - 0.9 %    Lymphocytes % 7.2 13.0 - 44.0 %    Monocytes % 7.7 2.0 - 10.0 %    Eosinophils % 0.6 0.0 - 6.0 %    Basophils % 0.3 0.0 - 2.0 %    Neutrophils Absolute 12.15 (H) 1.20 - 7.70 x10*3/uL    Immature Granulocytes Absolute, Automated 0.30 0.00 - 0.70 x10*3/uL    Lymphocytes Absolute 1.06 (L) 1.20 - 4.80 x10*3/uL     Monocytes Absolute 1.13 (H) 0.10 - 1.00 x10*3/uL    Eosinophils Absolute 0.09 0.00 - 0.70 x10*3/uL    Basophils Absolute 0.04 0.00 - 0.10 x10*3/uL   Comprehensive Metabolic Panel   Result Value Ref Range    Glucose 157 (H) 74 - 99 mg/dL    Sodium 131 (L) 136 - 145 mmol/L    Potassium 3.6 3.5 - 5.3 mmol/L    Chloride 98 98 - 107 mmol/L    Bicarbonate 25 21 - 32 mmol/L    Anion Gap 12 10 - 20 mmol/L    Urea Nitrogen 28 (H) 6 - 23 mg/dL    Creatinine 3.81 (H) 0.50 - 1.30 mg/dL    eGFR 20 (L) >60 mL/min/1.73m*2    Calcium 7.9 (L) 8.6 - 10.6 mg/dL    Albumin 2.2 (L) 3.4 - 5.0 g/dL    Alkaline Phosphatase 113 33 - 120 U/L    Total Protein 5.5 (L) 6.4 - 8.2 g/dL    AST 8 (L) 9 - 39 U/L    Bilirubin, Total 1.1 0.0 - 1.2 mg/dL    ALT 3 (L) 10 - 52 U/L   Magnesium   Result Value Ref Range    Magnesium 1.95 1.60 - 2.40 mg/dL   Phosphorus   Result Value Ref Range    Phosphorus 3.1 2.5 - 4.9 mg/dL   Calcium, ionized   Result Value Ref Range    POCT Calcium, Ionized 1.14 1.1 - 1.33 mmol/L   POCT GLUCOSE   Result Value Ref Range    POCT Glucose 152 (H) 74 - 99 mg/dL   POCT GLUCOSE   Result Value Ref Range    POCT Glucose 152 (H) 74 - 99 mg/dL   POCT GLUCOSE   Result Value Ref Range    POCT Glucose 128 (H) 74 - 99 mg/dL   Blood Culture    Specimen: Peripheral Venipuncture; Blood culture   Result Value Ref Range    Blood Culture Loaded on Instrument - Culture in progress       Transesophageal Echo (POLI)     [facility] ([facility] [address] [place] [address]                 Tel [phone] and Fax [phone]    TRANSESOPHAGEAL ECHOCARDIOGRAM REPORT       Patient Name:      XIOMARA CASSANDRA Maya Physician:    [id number] Penny Shaw MD  Study Date:        7/24/2024            Ordering Provider:    [id number] AZEEM SNYDER  MRN/PID:           [id number]             Fellow:                [id number] Trey Hurtado MD  Accession#:        [id number]         Nurse:                Radha Alaniz RN  Date of Birth/Age: 1985 / 39 years Sonographer:  Gender:            M                    Additional Staff:  Height:            175.00 cm            Admit Date:  Weight:            74.00 kg             Admission Status:     Inpatient -                                                                Routine  BSA / BMI:         1.89 m2 / 24.16      Encounter#:           [id number]                     kg/m2  Blood Pressure:    123/88 mmHg          Department Location:    Study Type:    TRANSESOPHAGEAL ECHO (POLI)  Diagnosis/ICD: Acute and subacute endocarditis, unspecified-I33.9  Indication:    endocarditis  CPT Code:      POLI Complete-48738; Doppler Limited-26949; Color Doppler-13416    Patient History:  Allergies:         Daptomycin.  Pertinent History: Renal Failure, HTN, Hyperlipidemia, Previous DVT, CHF and                     A-Fib. L AKA, R above elbow amputation.    Study Detail: Agitated saline used as a contrast agent for intraseptal flow                evaluation.       PHYSICIAN INTERPRETATION:  POLI Details: Technically adequate omniplane transesophageal echocardiogram performed. Agitated saline contrast echo was performed to assess for the presence of a patent foramen ovale.  POLI Medication: The pharynx was anesthetized with lidocaine ointment and Topix spray. The patient was sedated using moderate sedation. Midazolam and Fentanyl was used to sedate the patient for this exam.  POLI Procedure: The probe was passed without difficulty. Complications encountered during procedure: Patient tolerated the procedure well without any apparent complications.  Left Ventricle: Left ventricular ejection fraction is moderately decreased, by visual estimate at 40%. There is global hypokinesis of the left ventricle with minor regional variations. The left  ventricular cavity size is normal. Left ventricular diastolic filling was not assessed.  Left Atrium: The left atrium is normal in size. There is no definite left atrial thrombus present. There is a moderately sized patent foramen ovale. The patent foramen ovale was visualized using agitated saline contrast. A bubble study using agitated saline was performed. Bubble study is positive. A moderate PFO (11-20 bubbles) was demonstrated. There is no thrombus visualized in the left atrial appendage. Agitated saline contrast study was positive for an intracardiac shunt (moderate sized PFO).  Right Ventricle: The right ventricle is mildly enlarged. There is moderately reduced right ventricular systolic function.  Right Atrium: The right atrium is mildly dilated. There is a device visualized in the right atrium. There is a large vegetation that circumferentially encases the femoral TDC ( catheter). There is also involvement of the TV with likely destruction of the septal leaflet of the TV and resultant severe TR. ( the vegatation surrounding the catheter measures at least 1.9cm x 2.5cm.  Aortic Valve: The aortic valve is trileaflet. There is no evidence of aortic valve regurgitation. Trielaflet AV with no paravalular abscess or AI, however there is a small mobile echodensity on the LVOT side of the AV with independent motion whcih could be consitent with a small vegetation vs degenerative changes.  Mitral Valve: The mitral valve is normal in structure. There is no evidence of mitral valve regurgitation.  Tricuspid Valve: The tricuspid valve is abnormal. There is severe tricuspid regurgitation. The tricuspid valve regurgitant jet is eccentrically directed. Please see RA comments.  Pulmonic Valve: The pulmonic valve is structurally normal. There is trace pulmonic valve regurgitation.  Pericardium: There is no pericardial effusion noted.  Aorta: The aortic root is normal.  In comparison to the previous echocardiogram(s):  Compared with study dated 7/23/2024, Catheter associated vegetation and anatomy of TV are better visualized on today's study. Otherwise, there are no gross changes from the previous surface echo.       CONCLUSIONS:   1. Left ventricular ejection fraction is moderately decreased, by visual estimate at 40%.   2. There is global hypokinesis of the left ventricle with minor regional variations.   3. Mildly enlarged right ventricle.   4. There is moderately reduced right ventricular systolic function.   5. Agitated saline contrast study was positive for an intracardiac shunt (moderate sized PFO).   6. There is a large vegetation that circumferentially encases the femoral TDC ( catheter). There is also involvement of the TV with likely destruction of the septal leaflet of the TV and resultant severe TR. ( the vegatation surrounding the catheter measures at least 1.9cm x 2.5cm.   7. The tricuspid valve is abnormal.   8. Severe tricuspid regurgitation visualized.   9. Trielaflet AV with no paravalular abscess or AI, however there is a small mobile echodensity on the LVOT side of the AV with independent motion whcih could be consitent with a small vegetation vs degenerative changes.  10. Moderately sized patent foramen ovale.  11. No left atrial thrombus.  12. MICU team nofitied of findings at the time of study.  13. A bubble study using agitated saline was performed. Bubble study is positive. A moderate PFO (11-20 bubbles) was demonstrated.  14. Compared with study dated 7/23/2024, Catheter associated vegetation and anatomy of TV are better visualized on today's study. Otherwise, there are no gross changes from the previous surface echo.    QUANTITATIVE DATA SUMMARY:  LV SYSTOLIC FUNCTION BY 2D PLANIMETRY (MOD):                       Normal Ranges:  EF-Visual:      40 %  LV EF Reported: 40 %       [id number] Penny Shaw MD  Electronically signed on 7/24/2024 at 6:31:34 PM       ** Final **  Vascular US Lower Extremity Venous  Duplex Right              [facility]  [id number] [place] [address]    Tel [phone] and Fax [phone]       Vascular Lab Report  VASC US LOWER EXTREMITY VENOUS DUPLEX RIGHT       Patient Name:      XIOMARA CASSANDRA Maya Physician:  [id number] Bridger Ervin MD  Study Date:        7/24/2024            Ordering Physician: [id number] AZEEM SNYDER  MRN/PID:           [id number]             Technologist:       Antonietta Sierra RVT  Accession#:        [id number]         Technologist 2:  Date of Birth/Age: 1985 / 39 years Encounter#:         [id number]  Gender:            M  Admission Status:  Inpatient            Location Performed: [facility]       Diagnosis/ICD: Other specified soft tissue disorders-M79.89  Indication:    Limb swelling  CPT Codes:     69190 Peripheral venous duplex scan for DVT Limited       CONCLUSIONS:  Right Lower Venous: Unable to obtain right groin compression or visualize the DEIV, CFV, profunda and proximal FVs due to line placement. Can not rule out thrombus in non-visualized areas. Remainder visualized vessels show no evidence of DVT.  Left Lower Venous: There are chronic changes visualized in the common femoral and proximal femoral veins.     Imaging & Doppler Findings:     Right       Compressible Thrombus        Flow  FV Proximal     Yes        None   Spontaneous/Phasic  FV Mid          Yes        None  FV Distal       Yes        None  Popliteal       Yes        None   Spontaneous/Phasic  Peroneal        Yes        None  PTV             Yes        None       Left        Compress Thrombus  CFV         Partial  Chronic  FV Proximal Partial  Chronic       [id number] Bridger Ervin MD  Electronically signed by [id number] Bridger Ervin MD on 7/24/2024 at 12:41:59 PM       ** Final **  Electrocardiogram, 12-lead PRN ACS symptoms  Normal  sinus rhythm  Possible Anterior infarct , age undetermined  Abnormal ECG  When compared with ECG of 22-JUL-2024 11:06,  Borderline criteria for Anterior infarct are now Present  Nonspecific T wave abnormality no longer evident in Inferior leads  ECG 12 lead  Normal sinus rhythm  Prolonged QT  Abnormal ECG  When compared with ECG of 22-JUL-2024 10:44,  No significant change was found  See ED provider note for full interpretation and clinical correlation  Confirmed by Kelsie Cherry ([id number]) on 7/24/2024 5:46:20 AM     Encounter Date: 07/22/24   Electrocardiogram, 12-lead PRN ACS symptoms   Result Value    Ventricular Rate 97    Atrial Rate 97    NH Interval 122    QRS Duration 68    QT Interval 398    QTC Calculation(Bazett) 505    P Axis 39    R Axis -10    T Axis 61    QRS Count 16    Q Onset 223    P Onset 162    P Offset 194    T Offset 422    QTC Fredericia 466    Narrative    Normal sinus rhythm  Possible Anterior infarct , age undetermined  Abnormal ECG  When compared with ECG of 22-JUL-2024 11:06,  Borderline criteria for Anterior infarct are now Present  Nonspecific T wave abnormality no longer evident in Inferior leads     Scheduled medications  atorvastatin, 40 mg, oral, Nightly  B complex-vitamin C-folic acid, 1 capsule, oral, Daily  ceftaroline, 400 mg, intravenous, q8h  cholecalciferol, 50,000 Units, oral, Once per day on Monday Thursday  epoetin tyson or biosimilar, 10,000 Units, subcutaneous, Once per day on Monday Wednesday Friday  folic acid, 1 mg, oral, Daily  insulin glargine, 2 Units, subcutaneous, Nightly  insulin lispro, 0-5 Units, subcutaneous, q4h  levothyroxine, 125 mcg, oral, Daily before breakfast  melatonin, 6 mg, oral, Nightly  midodrine, 10 mg, oral, q8h  nystatin, 1 Application, Topical, BID  pantoprazole, 40 mg, oral, Daily before breakfast  thiamine, 200 mg, oral, Daily  vancomycin, 500 mg, intravenous, q12h    Continuous medications  norepinephrine, 0.01-1 mcg/kg/min, Last Rate:  Stopped (07/25/24 1030)  PrismaSol 4/2.5, 25 mL/kg/hr, Last Rate: 25 mL/kg/hr (07/24/24 2334)    PRN medications  PRN medications: acetaminophen **OR** acetaminophen **OR** acetaminophen, benzocaine, dextrose, dextrose, diphenhydrAMINE, fentaNYL, glucagon, glucagon, lidocaine, midazolam, vancomycin     Assessment/Plan    Edwar Hemphill is a 39 y.o. 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 GI is consulted for beginning anticoagulation with hx of UGI bleed. IR, ID, Cardiology, and Vascular Medicine all consulted and on board. Patient being treated for septic shock requiring pressors (Levophed) initially and infective endocarditis. Patient had a TTE revealing EF 40%, global hypokinesis of LV, enlarged RV and large vegetation circumferentially encasing the femoral TDC. Pt is currently on CVVH. Palliative Medicine was consulted today for GOC.     Palliative Performance Scale (PPS): 20%    ----------------------------------------------------------------------------------------------------------------------------------------------------------------------------------------------------------------------------------------------------------------------------------------------------------------------------------------------------------------------  Advanced Care Planning  Patient and family consented to a voluntary Advanced Care Planning meeting.   Serious Illness Assessment and Counseling: Septic Shock and Infective Endocarditis, GOC, Palliative Medicine,   Life Limiting Disease: Septic Shock and Infective Endocarditis posing threat to life or function.     Disease Specific Information Provided/Prognosis Discussed: Patient's current clinical condition, including  "diagnosis, prognosis, and management plan were discussed.   Counseling provided on goals of care, discharge expectations, and outpatient Palliative Care services.   Counseling provided on guarded prognosis and what to expect with disease progression of septic shock and infective endocarditis.   Counseling provided on the irreversible and progressive nature of patient's diseases including renal failure and heart failure.     Information: Pt wishes to receive medical information with full disclosure, both details, big picture, and pt states the truth. Pt wishes to receive medical information with transparency.    Understanding/Overall Impression: Patient expressing fair understanding of overall health status and severity of illness.     Goals/Hopes: Discussion ensued about patient's goals for their medical care going forward. Allowed patient time to talk about his/her current quality of life, disease course/progression, and symptom and treatment burden. Discussed care plan to continue with aggressive hospital care despite symptom and treatment burden versus choosing to transition to comfort based plan of care that focuses on symptom management and quality of life. Pt reports he wishes to get out of the hospital as soon as he is able. Pt's goal is to live life every day to the fullest. Everyday living and having fun!     Fears/Worries/Concerns: Pt denies any at this time.     Minimal Acceptable Quality of Life/Maximal Pecan Gap Tolerable for the Possibility of More Time: Counseling provided on the concept of MAO/Maximal Pecan Gap. Patient expressing that they wish to pursue all potential treatments and surgeries at this time. Pt wishes FULL STEAM AHEAD and all necessary interventions at this time. Pt states \" I am ready to get the ball rolling\".      Resuscitation Assessment: Counseling provided on the benefit versus burden of CPR in the setting of patient's overall health status and patient wishes to remain FULL CODE at " this time.      Advanced Directives:  Counseling provided on the importance of not crisis planning as disease burden progresses but to establish treatment limitations now so in the future medical team will be clear on what patient feels is an acceptable quality of life for the patient and what treatment limitations' patient would like set into place based on that.       Next of Kin: Pt's mother Chary Hemphill  Surrogate Health Care Decision Maker: Pt's mother Shanthi Hemphill (no formal HCPOA at time of visit).  Patient does not have a HPOA or living will. Counseling provided on benefit of completing advanced directives in order to establish person to make one's medical decision if patient were unable to speak for themselves and to make their health care wishes and treatment limitations to both hospital staff and their designated HPOA. Social work to help patient complete prior to discharge.     Code Status: Decision to keep code status FULL CODE at this time.     All questions and concerns were addressed during encounter.     I spent 75 minutes in providing separately identifiable ACP services with the patient and/or surrogate decision maker in a voluntary conversation discussing the patient's wishes and goals as detailed in the above note.   ----------------------------------------------------------------------------------------------------------------------------------------------------------------------------------------------------------------------------------------------------------------------------------------------------------------------------------------------------------------------    #Complex Medical Decision Making  #Goals of Care  #Advanced Care Planning  - Code status: FULL CODE  - Surrogate decision maker: Pt's mother Shanthi Hemphill [phone].  - Goals are survival and time based   - Plan for patient to complete Advanced Directives with social work prior to discharge. Pt wishes to designate his mother  Shanthi Hemphill HCPOA and agreeable to sign forms.     #Septic Shock  #Infective Endocarditis  #End Stage Renal Disease  #Heart Failure reduced EF  #Fatigue  #NICM    - Pt requests diet upgrade when no longer NPO for testing. Dr. Irwin updated.   - Assist pt with request to remove contacts and put in contact case as his eyes bothering him significantly since contacts have been in day and night since admission. Pita AMBRIZ RN (pt's nurse) getting contact case and solution on pt's bedside table ready to use.   - Patient reports trouble hearing both ears, recommend eval with otoscope asap and cerumen removal if impaction. ENT consult if needed for acute hearing loss if no visible obstruction recommended. Dr. Irwin updated.   - Palliative SW to assist in HCPOA document completion when able prior to discharge.  - Ongoing GOC and Palliative Medicine will continue ongoing support for patient and family as pt critically ill and guarded prognosis at this time.   - Consult music therapy asap per pt and family request.     #Psychosocial Support  - Consult Music Therapy  - Consult Art Therapy  - Palliative Care SW Support     Plan of Care discussed with: Updated MD Dr. Tucker Irwin and bedside RN Pita Brumfield RN on goals of care decision, medication adjustments, and code status.     Medical Decision Making was high level due to high complexity of problems, extensive data review, and high risk of management/treatment.     - Septic Shock and Infective Endocarditis posing threat to life and function  - Reviewed external notes from recent F admissions for GI Bleed and repeat hospitalizations 06/03/2024, 06/07/2024, 06/09/2024, [facility] Admission 06/19/2024- 06/20/2024, [facility] Hospitalization 05/28/2024, and 04/30/2024-05/26/2024.   - Discussion of management with primary team  - Drug therapy requiring intensive monitoring for toxicity: IV Vanco and IV Teflaro  - Decision regarding hospitalization or escalation  of hospital-level care: ICU setting, pt in MICU and critically ill at this time      Thank you for allowing us to participate in the care of this patient. Palliative will continue to follow as needed. Palliative Medicine is available Monday-Friday, 8a-6p. Please contact team with any questions or concerns.  Team pager [phone]  Kyleigh Campa CNP (on EPIC secure chat)  Palliative Medicine Nurse Practitioner   [email or web address]      AMARILYS Blair-CNP

## 2024-07-26 ENCOUNTER — APPOINTMENT (OUTPATIENT)
Dept: RADIOLOGY | Facility: HOSPITAL | Age: 39
End: 2024-07-26
Payer: COMMERCIAL

## 2024-07-26 PROBLEM — I33.0: Status: ACTIVE | Noted: 2024-07-22

## 2024-07-26 LAB
ALBUMIN SERPL BCP-MCNC: 2.6 G/DL (ref 3.4–5)
ALP SERPL-CCNC: 137 U/L (ref 33–120)
ALT SERPL W P-5'-P-CCNC: 6 U/L (ref 10–52)
ANION GAP SERPL CALC-SCNC: 14 MMOL/L (ref 10–20)
AST SERPL W P-5'-P-CCNC: 11 U/L (ref 9–39)
BASOPHILS # BLD AUTO: 0.03 X10*3/UL (ref 0–0.1)
BASOPHILS NFR BLD AUTO: 0.2 %
BILIRUB SERPL-MCNC: 0.8 MG/DL (ref 0–1.2)
BUN SERPL-MCNC: 19 MG/DL (ref 6–23)
CA-I BLD-SCNC: 1.13 MMOL/L (ref 1.1–1.33)
CALCIUM SERPL-MCNC: 8.3 MG/DL (ref 8.6–10.6)
CHLORIDE SERPL-SCNC: 98 MMOL/L (ref 98–107)
CO2 SERPL-SCNC: 25 MMOL/L (ref 21–32)
CREAT SERPL-MCNC: 2.52 MG/DL (ref 0.5–1.3)
EGFRCR SERPLBLD CKD-EPI 2021: 32 ML/MIN/1.73M*2
EOSINOPHIL # BLD AUTO: 0 X10*3/UL (ref 0–0.7)
EOSINOPHIL NFR BLD AUTO: 0 %
ERYTHROCYTE [DISTWIDTH] IN BLOOD BY AUTOMATED COUNT: 19 % (ref 11.5–14.5)
GLUCOSE BLD MANUAL STRIP-MCNC: 300 MG/DL (ref 74–99)
GLUCOSE BLD MANUAL STRIP-MCNC: 313 MG/DL (ref 74–99)
GLUCOSE BLD MANUAL STRIP-MCNC: 317 MG/DL (ref 74–99)
GLUCOSE BLD MANUAL STRIP-MCNC: 321 MG/DL (ref 74–99)
GLUCOSE SERPL-MCNC: 266 MG/DL (ref 74–99)
HCT VFR BLD AUTO: 26.3 % (ref 41–52)
HGB BLD-MCNC: 8.5 G/DL (ref 13.5–17.5)
IMM GRANULOCYTES # BLD AUTO: 0.24 X10*3/UL (ref 0–0.7)
IMM GRANULOCYTES NFR BLD AUTO: 1.6 % (ref 0–0.9)
LYMPHOCYTES # BLD AUTO: 0.68 X10*3/UL (ref 1.2–4.8)
LYMPHOCYTES NFR BLD AUTO: 4.4 %
MAGNESIUM SERPL-MCNC: 2.28 MG/DL (ref 1.6–2.4)
MCH RBC QN AUTO: 28.9 PG (ref 26–34)
MCHC RBC AUTO-ENTMCNC: 32.3 G/DL (ref 32–36)
MCV RBC AUTO: 90 FL (ref 80–100)
MONOCYTES # BLD AUTO: 0.28 X10*3/UL (ref 0.1–1)
MONOCYTES NFR BLD AUTO: 1.8 %
NEUTROPHILS # BLD AUTO: 14.19 X10*3/UL (ref 1.2–7.7)
NEUTROPHILS NFR BLD AUTO: 92 %
NRBC BLD-RTO: 0 /100 WBCS (ref 0–0)
PHOSPHATE SERPL-MCNC: 2.7 MG/DL (ref 2.5–4.9)
PLATELET # BLD AUTO: 44 X10*3/UL (ref 150–450)
POTASSIUM SERPL-SCNC: 3.9 MMOL/L (ref 3.5–5.3)
PROT SERPL-MCNC: 6.2 G/DL (ref 6.4–8.2)
RBC # BLD AUTO: 2.94 X10*6/UL (ref 4.5–5.9)
SODIUM SERPL-SCNC: 133 MMOL/L (ref 136–145)
VANCOMYCIN SERPL-MCNC: 27.4 UG/ML (ref 5–20)
WBC # BLD AUTO: 15.4 X10*3/UL (ref 4.4–11.3)

## 2024-07-26 PROCEDURE — 2550000001 HC RX 255 CONTRASTS: Performed by: STUDENT IN AN ORGANIZED HEALTH CARE EDUCATION/TRAINING PROGRAM

## 2024-07-26 PROCEDURE — 82330 ASSAY OF CALCIUM: CPT | Performed by: INTERNAL MEDICINE

## 2024-07-26 PROCEDURE — 2500000002 HC RX 250 W HCPCS SELF ADMINISTERED DRUGS (ALT 637 FOR MEDICARE OP, ALT 636 FOR OP/ED)

## 2024-07-26 PROCEDURE — 83735 ASSAY OF MAGNESIUM: CPT

## 2024-07-26 PROCEDURE — 99291 CRITICAL CARE FIRST HOUR: CPT

## 2024-07-26 PROCEDURE — 84100 ASSAY OF PHOSPHORUS: CPT

## 2024-07-26 PROCEDURE — 85025 COMPLETE CBC W/AUTO DIFF WBC: CPT

## 2024-07-26 PROCEDURE — 2500000005 HC RX 250 GENERAL PHARMACY W/O HCPCS

## 2024-07-26 PROCEDURE — A9575 INJ GADOTERATE MEGLUMI 0.1ML: HCPCS | Performed by: STUDENT IN AN ORGANIZED HEALTH CARE EDUCATION/TRAINING PROGRAM

## 2024-07-26 PROCEDURE — 80053 COMPREHEN METABOLIC PANEL: CPT

## 2024-07-26 PROCEDURE — 2500000001 HC RX 250 WO HCPCS SELF ADMINISTERED DRUGS (ALT 637 FOR MEDICARE OP)

## 2024-07-26 PROCEDURE — 6350000001 HC RX 635 EPOETIN >10,000 UNITS: Mod: JZ

## 2024-07-26 PROCEDURE — 2500000004 HC RX 250 GENERAL PHARMACY W/ HCPCS (ALT 636 FOR OP/ED): Performed by: STUDENT IN AN ORGANIZED HEALTH CARE EDUCATION/TRAINING PROGRAM

## 2024-07-26 PROCEDURE — 36415 COLL VENOUS BLD VENIPUNCTURE: CPT | Performed by: INTERNAL MEDICINE

## 2024-07-26 PROCEDURE — 80202 ASSAY OF VANCOMYCIN: CPT

## 2024-07-26 PROCEDURE — 2500000004 HC RX 250 GENERAL PHARMACY W/ HCPCS (ALT 636 FOR OP/ED): Mod: JZ

## 2024-07-26 PROCEDURE — 73223 MRI JOINT UPR EXTR W/O&W/DYE: CPT | Mod: LT

## 2024-07-26 PROCEDURE — 99232 SBSQ HOSP IP/OBS MODERATE 35: CPT | Performed by: INTERNAL MEDICINE

## 2024-07-26 PROCEDURE — 2500000004 HC RX 250 GENERAL PHARMACY W/ HCPCS (ALT 636 FOR OP/ED)

## 2024-07-26 PROCEDURE — 82947 ASSAY GLUCOSE BLOOD QUANT: CPT

## 2024-07-26 PROCEDURE — 73223 MRI JOINT UPR EXTR W/O&W/DYE: CPT | Mod: LEFT SIDE | Performed by: RADIOLOGY

## 2024-07-26 PROCEDURE — 2020000001 HC ICU ROOM DAILY

## 2024-07-26 PROCEDURE — 99233 SBSQ HOSP IP/OBS HIGH 50: CPT | Performed by: NURSE PRACTITIONER

## 2024-07-26 PROCEDURE — 99233 SBSQ HOSP IP/OBS HIGH 50: CPT | Performed by: INTERNAL MEDICINE

## 2024-07-26 PROCEDURE — 99222 1ST HOSP IP/OBS MODERATE 55: CPT | Performed by: NURSE PRACTITIONER

## 2024-07-26 PROCEDURE — 2500000001 HC RX 250 WO HCPCS SELF ADMINISTERED DRUGS (ALT 637 FOR MEDICARE OP): Performed by: INTERNAL MEDICINE

## 2024-07-26 PROCEDURE — 2500000004 HC RX 250 GENERAL PHARMACY W/ HCPCS (ALT 636 FOR OP/ED): Performed by: INTERNAL MEDICINE

## 2024-07-26 PROCEDURE — 90937 HEMODIALYSIS REPEATED EVAL: CPT

## 2024-07-26 RX ORDER — INSULIN GLARGINE 100 [IU]/ML
4 INJECTION, SOLUTION SUBCUTANEOUS NIGHTLY
Status: DISCONTINUED | OUTPATIENT
Start: 2024-07-26 | End: 2024-07-27

## 2024-07-26 RX ORDER — HEPARIN SODIUM 1000 [USP'U]/ML
1000 INJECTION, SOLUTION INTRAVENOUS; SUBCUTANEOUS AS NEEDED
Status: DISCONTINUED | OUTPATIENT
Start: 2024-07-26 | End: 2024-08-09

## 2024-07-26 RX ORDER — GADOTERATE MEGLUMINE 376.9 MG/ML
15 INJECTION INTRAVENOUS
Status: COMPLETED | OUTPATIENT
Start: 2024-07-26 | End: 2024-07-26

## 2024-07-26 RX ORDER — INSULIN LISPRO 100 [IU]/ML
0-10 INJECTION, SOLUTION INTRAVENOUS; SUBCUTANEOUS
Status: DISCONTINUED | OUTPATIENT
Start: 2024-07-26 | End: 2024-08-05

## 2024-07-26 RX ORDER — HEPARIN SODIUM 1000 [USP'U]/ML
INJECTION, SOLUTION INTRAVENOUS; SUBCUTANEOUS
Status: COMPLETED
Start: 2024-07-26 | End: 2024-07-26

## 2024-07-26 ASSESSMENT — PAIN SCALES - GENERAL
PAINLEVEL_OUTOF10: 0 - NO PAIN

## 2024-07-26 ASSESSMENT — PAIN - FUNCTIONAL ASSESSMENT
PAIN_FUNCTIONAL_ASSESSMENT: 0-10
PAIN_FUNCTIONAL_ASSESSMENT: 0-10

## 2024-07-26 NOTE — CONSULTS
Inpatient consult to [facility]  Consult performed by: AMARILYS Hinojosa-CNP  Consult ordered by: Dayton Young MD  Reason for consult: for consideration of TV veggetation removal        History Of Present Illness:    Edwar Hemphill is a 39 y.o. male presenting to MICU for septic shock likely 2/2 right groin tunneled HD line with bacteremia with Staph aureus in blood. Pt with multiple dialysis line placement by IR, found to be tachycardic and hypotensive at dialysis. 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 He had a TTE 7/23 which showed TV with likely mobile echo densities with severe TR that was reportedly confirmed by POLI on 7/24. He remains in septic shock, septic emboli, + blood cultures as of 7/25, low platelets [id number]. Vascular surgery involved now in setting of infected tunneled HD line and need for alternative access as well as potential for surgical removal of tunneled line with concern for clot formation along length. They recommended Cardiac surgery consult for possible open tip removal which is pending review of images by Dr. Jones and EVLS for possible Vegectomy.      Last Recorded Vitals:  Vitals:    07/26/24 1154 07/26/24 1200 07/26/24 1300 07/26/24 1500   BP:  83/51 (!) 87/47 85/59   Pulse:  93 96 101   Resp:  12 12 17   Temp: 36 °C (96.8 °F)      TempSrc: Temporal      SpO2:  94%     Weight:       Height:           Last Labs:  CBC - 7/26/2024:  4:27 AM  15.4 8.5 44    26.3      CMP - 7/26/2024:  4:27 AM  8.3 6.2 11 --- 0.8   2.7 2.6 6 137      PTT - 7/24/2024:  3:45 AM  1.4   15.4 34      Troponin I, High Sensitivity   Date/Time Value Ref Range Status   05/28/2024 04:16 AM 18 0 - 20 ng/L Final     Troponin I, High Sensitivity ([facility])   Date/Time Value Ref Range Status   07/22/2024 10:24 PM 63 (H) 0 - 53 ng/L Final   07/22/2024 10:45 AM 6 0 - 53 ng/L Final     Troponin I   Date/Time Value Ref Range Status   09/13/2023 01:51 PM 22 (H) 0 - 20 ng/L Final     Comment:     .  Less than 99th percentile of normal range cutoff-  Female and children under 18 years old <14 ng/L; Male <21 ng/L: Negative  Repeat testing should be performed if clinically indicated.   .  Female and children under 18 years old 14-50 ng/L; Male 21-50 ng/L:  Consistent with possible cardiac damage and possible increased clinical   risk. Serial measurements may help to assess extent of myocardial damage.   .  >50 ng/L: Consistent with cardiac damage, increased clinical risk and  myocardial infarction. Serial measurements may help assess extent of   myocardial damage.   .   NOTE: Children less than 1 year old may have higher baseline troponin   levels and results should be interpreted in conjunction with the overall   clinical context.   .  NOTE: Troponin I testing is performed using a different   testing methodology at [facility] than at other   [facility]. Direct result comparisons should only   be made within the same method.       Hemoglobin A1C   Date/Time Value Ref Range Status   03/30/2024 05:49 AM 6.8 (H) 4.3 - 5.6 % Final     Comment:     American Diabetes Association guidelines indicate that patients with HgbA1c in the range 5.7-6.4% are at increased risk for development of diabetes, and intervention by lifestyle modification may be beneficial. HgbA1c greater or equal to 6.5% is considered diagnostic of diabetes.   09/14/2023 10:14 AM 5.2 % Final     Comment:          Diagnosis of Diabetes-Adults   Non-Diabetic: < or = 5.6%   Increased risk for developing diabetes: 5.7-6.4%   Diagnostic of diabetes: > or  = 6.5%  .       Monitoring of Diabetes                Age (y)     Therapeutic Goal (%)   Adults:          >18           <7.0   Pediatrics:    13-18           <7.5                   7-12           <8.0                   0- 6            7.5-8.5   American Diabetes Association. Diabetes Care 33(S1), Jan 2010.     08/07/2023 10:12 PM 5.8 (A) % Final     Comment:          Diagnosis of Diabetes-Adults   Non-Diabetic: < or = 5.6%   Increased risk for developing diabetes: 5.7-6.4%   Diagnostic of diabetes: > or = 6.5%  .       Monitoring of Diabetes                Age (y)     Therapeutic Goal (%)   Adults:          >18           <7.0   Pediatrics:    13-18           <7.5                   7-12           <8.0                   0- 6            7.5-8.5   American Diabetes Association. Diabetes Care 33(S1), Jan 2010.     10/15/2022 03:35 PM 7.3 (H) 4.3 - 5.6 % Final     Comment:     American Diabetes Association guidelines indicate that patients with HgbA1c in the range 5.7-6.4% are at increased risk for development of diabetes, and intervention by lifestyle modification may be beneficial. HgbA1c greater or equal to 6.5% is considered diagnostic of diabetes.     VLDL   Date/Time Value Ref Range Status   11/11/2022 01:16 PM 5 0 - 40 mg/dL Final      Last I/O:  I/O last 3 completed shifts:  In: 756.3 (10.7 mL/kg) [I.V.:256.3 (3.6 mL/kg); IV Piggyback:500]  Out: 695 (9.8 mL/kg) [Other:695]  Dosing Weight: 70.9 kg     Past Cardiology Tests (Last 3 Years):  EKG:  Electrocardiogram, 12-lead PRN ACS symptoms 07/24/2024    Echo:  Transesophageal Echo (POLI) 07/24/2024  Transthoracic Echo (TTE) Limited 07/23/2024  Transesophageal Echo (POLI) 05/14/2024  Transthoracic Echo Complete 05/03/2024  Transesophageal Echo (POLI) 01/19/2024  Transthoracic Echo (TTE) Complete 01/16/2024  Transthoracic Echo (TTE) Complete 11/14/2023    Ejection Fractions:  EF   Date/Time Value Ref Range Status   07/24/2024 05:20 PM 40 %    07/23/2024 09:43 AM 35 %     01/16/2024 04:23 PM 68         Past Medical History:  He has a past medical history of Chronic kidney disease, Diabetes mellitus (Multi), ESRD (end stage renal disease) (Multi), Essential (primary) hypertension (05/26/2017), GERD (gastroesophageal reflux disease), Hyperlipidemia, and Hypothyroidism.    Past Surgical History:  He has a past surgical history that includes CT angio aorta and bilateral iliofemoral runoff w and or wo IV contrast (02/09/2023); IR CVC exchange (11/13/2023); Toe amputation (Left, 02/17/2023); Leg amputation, lower tibia/fibula (Left, 07/05/2023); Leg amputation through knee (Left, 07/08/2023); Wound debridement (Left, 07/26/2023); Wound debridement (Left, 08/02/2023); Arm amputation at elbow (Right, 05/2021); AV fistula placement w/ PTFE; IR CVC PICC (10/19/2023); and IR CVC PICC (2/28/2022).      Social History:  He reports that he has been smoking cigarettes. He has a 3.8 pack-year smoking history. He has never used smokeless tobacco. No history on file for alcohol use and drug use.    Family History:  Family History   Problem Relation Name Age of Onset    Other (DIABETES DUE TO UNDERLYING CONDITION WITH MICROALBUMINURIA) Father      Other (DIABETES DUE TO UNDERLYING CONDITION WITH MICROALBUMINURIA [Other]) Other AUNT     Other (DIABETES DUE TO UNDERLYING CONDITION WITH MICROALBUMINURIA [Other]) Other GP         Allergies:  Cashew nut and Daptomycin    Inpatient Medications:  Scheduled medications   Medication Dose Route Frequency    atorvastatin  40 mg oral Nightly    B complex-vitamin C-folic acid  1 capsule oral Daily    ceftaroline  400 mg intravenous q8h    cholecalciferol  50,000 Units oral Once per day on Monday Thursday    epoetin tyson or biosimilar  10,000 Units subcutaneous Once per day on Monday Wednesday Friday    folic acid  1 mg oral Daily    heparin        hydrocortisone sodium succinate  50 mg intravenous q6h    insulin glargine  4 Units subcutaneous Nightly    insulin  lispro  0-10 Units subcutaneous TID    levothyroxine  125 mcg oral Daily before breakfast    melatonin  6 mg oral Nightly    midodrine  10 mg oral q8h    nystatin  1 Application Topical BID    pantoprazole  40 mg oral Daily before breakfast    thiamine  200 mg oral Daily    [Held by provider] vancomycin  500 mg intravenous q12h     PRN medications   Medication    acetaminophen    Or    acetaminophen    Or    acetaminophen    benzocaine    dextrose    dextrose    diphenhydrAMINE    fentaNYL    glucagon    glucagon    heparin    lidocaine    midazolam    vancomycin     Continuous Medications   Medication Dose Last Rate    norepinephrine  0-0.2 mcg/kg/min 0.04 mcg/kg/min (07/26/24 1517)    PrismaSol 4/2.5  25 mL/kg/hr 25 mL/kg/hr (07/26/24 0111)     Outpatient Medications:  Current Outpatient Medications   Medication Instructions    atorvastatin (LIPITOR) 40 mg, oral, Nightly    B complex-vitamin C-folic acid (Nephrocaps) 1 mg capsule 1 capsule, oral, Daily    cholecalciferol (VITAMIN D-3) 50,000 Units, oral, 2 times weekly, On Monday and Thursday    epoetin tyson-epbx (RETACRIT) 10,000 Units, subcutaneous, 3 times weekly    folic acid (FOLVITE) 1 mg, oral, Daily    insulin lispro (HUMALOG) 0-15 Units, subcutaneous, 3 times daily (morning, midday, late afternoon), Take as directed per insulin instructions.    Lactobacillus acidophilus 1 billion cell tablet 1 tablet, oral, 2 times daily    Lantus U-100 Insulin 8 Units, subcutaneous, Nightly, Take as directed per insulin instructions.    levothyroxine (SYNTHROID, LEVOXYL) 125 mcg, oral, Daily before breakfast    loperamide (IMODIUM) 4 mg, oral, Every 2 hour PRN    melatonin 6 mg, oral, Nightly    midodrine (PROAMATINE) 10 mg, oral, Every 4 hours PRN    oxyCODONE-acetaminophen (Percocet) 5-325 mg tablet 1 tablet, oral, Every 6 hours PRN    pantoprazole (PROTONIX) 40 mg, oral, Daily before breakfast, Do not crush, chew, or split.    sevelamer carbonate (RENVELA) 2,400 mg,  oral, 3 times daily (morning, midday, late afternoon)    sodium zirconium cyclosilicate (LOKELMA) 5 g, oral, 3 times weekly, On Tuesday, Thursday and Saturday    thiamine (VITAMIN B-1) 200 mg, oral, Daily     CT chest abdomen pelvis w IV contrast 7/25/24  IMPRESSION:  CHEST:  1. Interval development of bilateral pulmonary nodular opacities  several of them showing cavitation. Findings are concerning for  septic embolism/cavitary pneumonia.  2. Bilateral pleural effusions.  3. Right femoral central venous catheter with tip terminating in the  right ventricle. Hypodense thrombus around the catheter in the right  atrium correlating with the vegetation seen on prior echocardiogram.  4. Severely attenuated superior vena cava, bilateral brachiocephalic,  subclavian and internal jugular veins with the calcifications in the  brachiocephalic veins with extensive collaterals in the chest wall  likely representing chronic thrombosis.      ABDOMEN-PELVIS:  1. Severely attenuated inferior vena cava with multiple  calcifications, consistent with chronic IVC thrombosis.  2. Interval development of a T10 vertebral body fracture.  Re-demonstration of severe osseous changes throughout the  thoracolumbar spine. Findings are likely secondary to renal  osteodystrophy.  3. Diffuse thickening of the colon and rectum with the pericolonic  stranding consistent with the proctocolitis which can be  inflammatory/infectious in etiology. Correlate with clinical symptoms.  4. Diffuse mesenteric haziness and anasarca likely representing fluid  overload.  5. Bilateral atrophied kidneys with hypoenhancement, representative  of medical renal disease.    Transesophageal Echo (POLI) Complete With Doppler And Color 7/24/24   CONCLUSIONS:   1. Left ventricular ejection fraction is moderately decreased, by visual estimate at 40%.   2. There is global hypokinesis of the left ventricle with minor regional variations.   3. Mildly enlarged right ventricle.    4. There is moderately reduced right ventricular systolic function.   5. Agitated saline contrast study was positive for an intracardiac shunt (moderate sized PFO).   6. There is a large vegetation that circumferentially encases the femoral TDC ( catheter). There is also involvement of the TV with likely destruction of the septal leaflet of the TV and resultant severe TR. ( the vegatation surrounding the catheter measures at least 1.9cm x 2.5cm.   7. The tricuspid valve is abnormal.   8. Severe tricuspid regurgitation visualized.   9. Trielaflet AV with no paravalular abscess or AI, however there is a small mobile echodensity on the LVOT side of the AV with independent motion whcih could be consitent with a small vegetation vs degenerative changes.  10. Moderately sized patent foramen ovale.  11. No left atrial thrombus.  13. A bubble study using agitated saline was performed. Bubble study is positive. A moderate PFO (11-20 bubbles) was demonstrated.  14. Compared with study dated 7/23/2024, Catheter associated vegetation and anatomy of TV are better visualized on today's study. Otherwise, there are no gross changes from the previous surface echo.      Transthoracic Echo (TTE) Limited With Doppler, Color And Contrast  7/23/24  CONCLUSIONS:   1. Left ventricular ejection fraction is moderately decreased, calculated by Briseno's biplane at 35%.   2. There is global hypokinesis of the left ventricle with minor regional variations.   3. No left ventricular thrombus visualized.   4. There is reduced right ventricular systolic function.   5. Several small mobile echodensities noted within the RA likely attached to dialysis catheter and thickened TV with mobile echo densities associated with the TV concerning for possible vegetations with severe TR. Not well seen. Recommend POLI to further assess if clinically indicated.   6. There is moderate mitral annular calcification.   7. The tricuspid valve is abnormal.   8. Mildly  elevated RVSP.   9. Severe tricuspid regurgitation visualized.  10. TV with likely mobile echodensities associated tih the leaflets though not well seen with severe TR. RVSP may be underestimated due to severity of TR.  11. There is reversed systolic hepatic vein flow.  13. Compared with the prior exam POLI from 5/14/2024 ( [facility]) the TV was previously normal with only trivial TR. The dialysis catheter seen within the RA is new since the prior POLI and the mobile echodensities and severe TR are new as well. The LV systolic function was already mild to moderately reduced at that time.      Physical Exam:  Constitutional: no acute distress  Neuro: A/O x4, no gross deficits   Psych: flat affect, frustrated by seen by mutiple providers. He kept his eye closed during assessment and did not engage    Cardiac: ST on tele  Head/Neck: normal, left internal jugular line   Pulmonary: unlabored respirations, on RA   Abdomen: soft, non distended, non tender  Skin: warm and dry overall    Extremities: +2 RLE edema, xerotic LE skin, L AKA, RUE amputation, femoral CVC in R groin. Pt on CVVH     Assessment/Plan   Edwar Hemphill is a 39 y.o. male presenting to MICU for septic shock likely 2/2 right groin tunneled HD line with bacteremia with Staph aureus in blood.     TV vegetation     POLI: large vegetation that circumferentially encases the femoral TDC ( catheter). There is also involvement of the TV with likely destruction of the septal leaflet of the TV and resultant severe TR. ( the vegatation surrounding the catheter measures at least 1.9cm x 2.5cm.    VS:  BP 90/58 (66)  RR 15 99% on RA   On CVVH  Levo 12.8 ml/hr       - Tentatively planning for vegectomy with Dr. Dennis Monday 7/29 pending full image review  - Pt can have light breakfast around 7 am and NPO after. Case order placed.  - continue all other treatment per critical care team.   - Case and plan d/w Dr Hunter and Dr. Dennis            Peripheral IV 07/22/24 18  G Distal;Left;Upper;Anterior Arm (Active)   Site Assessment Clean;Dry;Intact 07/26/24 0800   Dressing Type Transparent 07/26/24 0800   Line Status Infusing 07/26/24 0800   Dressing Status Clean;Dry;Occlusive 07/26/24 0800   Number of days: 4       Peripheral IV 07/22/24 16 G Left;Anterior External Jugular (Active)   Site Assessment Clean;Dry;Intact 07/26/24 0800   Dressing Type Transparent 07/26/24 0800   Line Status Infusing;Blood return noted 07/26/24 0800   Dressing Status Clean;Occlusive;Dry 07/26/24 0800   Number of days: 4       Hemodialysis Cath Right Femoral (Active)   Line Necessity Continuous renal replacement therapy 07/26/24 0800   Line Necessity Reviewed With Dr Young 07/26/24 0800   Site Assessment Clean;Dry;Intact 07/26/24 0800   Dressing Type Transparent;Antimicrobial patch 07/26/24 0800   Dressing Status Clean;Dry;Occlusive 07/26/24 0800   Number of days:        Code Status:  Full Code          Mahadr Martinez, APRN-CNP  Endovascular[facility] Service   Day: [id number]/Haiku/Night HHVI [id number]

## 2024-07-26 NOTE — PROGRESS NOTES
Edwar Hemphill   39 y.oLorraine    @WT@  MRN/Room: [id number]/24/24-A    Subjective:   No acute event overnight.    Objective:     Meds:   atorvastatin, 40 mg, Nightly  B complex-vitamin C-folic acid, 1 capsule, Daily  ceftaroline, 400 mg, q8h  cholecalciferol, 50,000 Units, Once per day on Monday Thursday  epoetin tyson or biosimilar, 10,000 Units, Once per day on Monday Wednesday Friday  folic acid, 1 mg, Daily  heparin, ,   hydrocortisone sodium succinate, 50 mg, q6h  insulin glargine, 4 Units, Nightly  insulin lispro, 0-10 Units, TID  levothyroxine, 125 mcg, Daily before breakfast  melatonin, 6 mg, Nightly  midodrine, 10 mg, q8h  nystatin, 1 Application, BID  pantoprazole, 40 mg, Daily before breakfast  thiamine, 200 mg, Daily  [Held by provider] vancomycin, 500 mg, q12h      norepinephrine, Last Rate: 0.04 mcg/kg/min (07/26/24 0800)  PrismaSol 4/2.5, Last Rate: 25 mL/kg/hr (07/26/24 0111)      acetaminophen, 650 mg, q4h PRN   Or  acetaminophen, 650 mg, q4h PRN   Or  acetaminophen, 650 mg, q4h PRN  benzocaine, , PRN  dextrose, 12.5 g, q15 min PRN  dextrose, 25 g, q15 min PRN  diphenhydrAMINE, 25 mg, q6h PRN  fentaNYL, , PRN  glucagon, 1 mg, q15 min PRN  glucagon, 1 mg, q15 min PRN  heparin, ,   lidocaine, , 4x daily PRN  midazolam, , PRN  vancomycin, , Daily PRN        Vitals:    07/26/24 1200   BP: 83/51   Pulse: 93   Resp: 12   Temp:    SpO2: 94%          Intake/Output Summary (Last 24 hours) at 7/26/2024 1403  Last data filed at 7/26/2024 1200  Gross per 24 hour   Intake 829.32 ml   Output 459 ml   Net 370.32 ml       General appearance: no distress  Eyes: non-icteric  Skin: no apparent rash  Heart: RRR  Lungs: CTA bilat no wheezing/crackles  Abdomen: soft, nt/nd  Extremities: no edema bilat  Access Left femoral TDC    Blood Labs:  Results for orders placed or performed during the hospital encounter of 07/22/24 (from the past 24 hour(s))   Vancomycin   Result Value Ref Range    Vancomycin 30.7 (H) 5.0 - 20.0 ug/mL    Blood Culture    Specimen: Peripheral Venipuncture; Blood culture   Result Value Ref Range    Blood Culture       Identification and susceptibility testing to follow    Gram Stain Gram positive cocci, clusters (AA)    BUN   Result Value Ref Range    Urea Nitrogen 23 6 - 23 mg/dL   Calcium, ionized   Result Value Ref Range    POCT Calcium, Ionized 1.17 1.1 - 1.33 mmol/L   Creatinine, Serum   Result Value Ref Range    Creatinine 3.32 (H) 0.50 - 1.30 mg/dL    eGFR 23 (L) >60 mL/min/1.73m*2   Electrolyte panel   Result Value Ref Range    Sodium 131 (L) 136 - 145 mmol/L    Potassium 4.1 3.5 - 5.3 mmol/L    Chloride 96 (L) 98 - 107 mmol/L    Bicarbonate 21 21 - 32 mmol/L    Anion Gap 18 10 - 20 mmol/L   Phosphorus   Result Value Ref Range    Phosphorus 3.2 2.5 - 4.9 mg/dL   Blood Gas Venous Full Panel   Result Value Ref Range    POCT pH, Venous 7.36 7.33 - 7.43 pH    POCT pCO2, Venous 41 41 - 51 mm Hg    POCT pO2, Venous 29 (L) 35 - 45 mm Hg    POCT SO2, Venous 33 (L) 45 - 75 %    POCT Oxy Hemoglobin, Venous 32.1 (L) 45.0 - 75.0 %    POCT Hematocrit Calculated, Venous 28.0 (L) 41.0 - 52.0 %    POCT Sodium, Venous 130 (L) 136 - 145 mmol/L    POCT Potassium, Venous 4.4 3.5 - 5.3 mmol/L    POCT Chloride, Venous 98 98 - 107 mmol/L    POCT Ionized Calicum, Venous 1.18 1.10 - 1.33 mmol/L    POCT Glucose, Venous 212 (H) 74 - 99 mg/dL    POCT Lactate, Venous 1.8 0.4 - 2.0 mmol/L    POCT Base Excess, Venous -2.1 (L) -2.0 - 3.0 mmol/L    POCT HCO3 Calculated, Venous 23.2 22.0 - 26.0 mmol/L    POCT Hemoglobin, Venous 9.4 (L) 13.5 - 17.5 g/dL    POCT Anion Gap, Venous 13.0 10.0 - 25.0 mmol/L    Patient Temperature 37.0 degrees Celsius    FiO2 21 %   POCT GLUCOSE   Result Value Ref Range    POCT Glucose 213 (H) 74 - 99 mg/dL   Blood Gas Venous Full Panel   Result Value Ref Range    POCT pH, Venous 7.38 7.33 - 7.43 pH    POCT pCO2, Venous 36 (L) 41 - 51 mm Hg    POCT pO2, Venous 42 35 - 45 mm Hg    POCT SO2, Venous 61 45 - 75 %     POCT Oxy Hemoglobin, Venous 59.2 45.0 - 75.0 %    POCT Hematocrit Calculated, Venous 27.0 (L) 41.0 - 52.0 %    POCT Sodium, Venous 129 (L) 136 - 145 mmol/L    POCT Potassium, Venous 4.1 3.5 - 5.3 mmol/L    POCT Chloride, Venous 99 98 - 107 mmol/L    POCT Ionized Calicum, Venous 1.22 1.10 - 1.33 mmol/L    POCT Glucose, Venous 272 (H) 74 - 99 mg/dL    POCT Lactate, Venous 1.6 0.4 - 2.0 mmol/L    POCT Base Excess, Venous -3.4 (L) -2.0 - 3.0 mmol/L    POCT HCO3 Calculated, Venous 21.3 (L) 22.0 - 26.0 mmol/L    POCT Hemoglobin, Venous 8.9 (L) 13.5 - 17.5 g/dL    POCT Anion Gap, Venous 13.0 10.0 - 25.0 mmol/L    Patient Temperature 37.0 degrees Celsius    FiO2 21 %   CBC   Result Value Ref Range    WBC 23.4 (H) 4.4 - 11.3 x10*3/uL    nRBC 0.0 0.0 - 0.0 /100 WBCs    RBC 2.83 (L) 4.50 - 5.90 x10*6/uL    Hemoglobin 8.3 (L) 13.5 - 17.5 g/dL    Hematocrit 23.6 (L) 41.0 - 52.0 %    MCV 83 80 - 100 fL    MCH 29.3 26.0 - 34.0 pg    MCHC 35.2 32.0 - 36.0 g/dL    RDW 18.2 (H) 11.5 - 14.5 %    Platelets 53 (L) 150 - 450 x10*3/uL   POCT GLUCOSE   Result Value Ref Range    POCT Glucose 257 (H) 74 - 99 mg/dL   CBC and Auto Differential   Result Value Ref Range    WBC 15.4 (H) 4.4 - 11.3 x10*3/uL    nRBC 0.0 0.0 - 0.0 /100 WBCs    RBC 2.94 (L) 4.50 - 5.90 x10*6/uL    Hemoglobin 8.5 (L) 13.5 - 17.5 g/dL    Hematocrit 26.3 (L) 41.0 - 52.0 %    MCV 90 80 - 100 fL    MCH 28.9 26.0 - 34.0 pg    MCHC 32.3 32.0 - 36.0 g/dL    RDW 19.0 (H) 11.5 - 14.5 %    Platelets 44 (L) 150 - 450 x10*3/uL    Neutrophils % 92.0 40.0 - 80.0 %    Immature Granulocytes %, Automated 1.6 (H) 0.0 - 0.9 %    Lymphocytes % 4.4 13.0 - 44.0 %    Monocytes % 1.8 2.0 - 10.0 %    Eosinophils % 0.0 0.0 - 6.0 %    Basophils % 0.2 0.0 - 2.0 %    Neutrophils Absolute 14.19 (H) 1.20 - 7.70 x10*3/uL    Immature Granulocytes Absolute, Automated 0.24 0.00 - 0.70 x10*3/uL    Lymphocytes Absolute 0.68 (L) 1.20 - 4.80 x10*3/uL    Monocytes Absolute 0.28 0.10 - 1.00 x10*3/uL     Eosinophils Absolute 0.00 0.00 - 0.70 x10*3/uL    Basophils Absolute 0.03 0.00 - 0.10 x10*3/uL   Comprehensive Metabolic Panel   Result Value Ref Range    Glucose 266 (H) 74 - 99 mg/dL    Sodium 133 (L) 136 - 145 mmol/L    Potassium 3.9 3.5 - 5.3 mmol/L    Chloride 98 98 - 107 mmol/L    Bicarbonate 25 21 - 32 mmol/L    Anion Gap 14 10 - 20 mmol/L    Urea Nitrogen 19 6 - 23 mg/dL    Creatinine 2.52 (H) 0.50 - 1.30 mg/dL    eGFR 32 (L) >60 mL/min/1.73m*2    Calcium 8.3 (L) 8.6 - 10.6 mg/dL    Albumin 2.6 (L) 3.4 - 5.0 g/dL    Alkaline Phosphatase 137 (H) 33 - 120 U/L    Total Protein 6.2 (L) 6.4 - 8.2 g/dL    AST 11 9 - 39 U/L    Bilirubin, Total 0.8 0.0 - 1.2 mg/dL    ALT 6 (L) 10 - 52 U/L   Magnesium   Result Value Ref Range    Magnesium 2.28 1.60 - 2.40 mg/dL   Phosphorus   Result Value Ref Range    Phosphorus 2.7 2.5 - 4.9 mg/dL   Calcium, ionized   Result Value Ref Range    POCT Calcium, Ionized 1.13 1.1 - 1.33 mmol/L   Vancomycin   Result Value Ref Range    Vancomycin 27.4 (H) 5.0 - 20.0 ug/mL   POCT GLUCOSE   Result Value Ref Range    POCT Glucose 300 (H) 74 - 99 mg/dL   POCT GLUCOSE   Result Value Ref Range    POCT Glucose 317 (H) 74 - 99 mg/dL              ASSESSMENT:  Edwar Hemphill is a  39 y.o.  Year old , with PMHx of ESRD on HD (MWF, right femoral line), HFrEF (EF 35-40% 5/2024), T2DM, PVD (s/p left AKA and right forearm amputation), multiple line-related DVT (RIJ, LIJ, RUE, LUE, RLE, LLE; on Eliquis prior to recent upper GI bleed 6/15) presenting from [facility] due to hypotension prior to dialysis. Admitted to MICU for septic shock. Nephrology consulted for dialysis needs.      #ESRD on HD MWF via R femoral TDC  #Hx of multiple HD catheter infections, mrsa bacteremia   -HD at [facility], nephrologist Dr. Licona - last HD 7/19  Started on CVVH 7/23 due to hemodynamic instability.     #Septic shock 2/2 staph aureus bacteremia  #TV infective endocarditis   -vancomycin, ceftaroline   -levophed      #MBD    - Renelva discontinued on 07/24    #Anemia   - epo         RECOMMENDATIONS:  -plan to continue CVVH today. He is pending further evaluation by IR and CT surgery to assess vasculature for HD access with need for TDC removal.  -Recommend fluid removal to maintain net even I/O fluid balance   - Supportive treatment as per ICU team.        Mary Hayes DO  Nephrology Fellow   Daytime / Weekend Renal Pager [phone]  After 7 pm Emergencies [phone] Pager [phone]

## 2024-07-26 NOTE — PROGRESS NOTES
Vancomycin Dosing by Pharmacy- FOLLOW UP    Edwar Hemphill is a 39 y.o. year old male who Pharmacy has been consulted for vancomycin dosing for other bacteremia . Based on the patient's indication and renal status this patient is being dosed based on a goal trough/random level of 15-20.     Renal function is currently CVVH dependent.    Current vancomycin dose: 500 mg given every 12 hours    Most recent random level: 27.4 mcg/mL. Last two levels were drawn <2 hours post dose or within minutes of starting a dose (per EMR). Hard to establish patient's true vancomycin level at this time.     Visit Vitals  BP 83/51   Pulse 93   Temp 36 °C (96.8 °F) (Temporal)   Resp 12        Lab Results   Component Value Date    CREATININE 2.52 (H) 2024    CREATININE 3.32 (H) 2024    CREATININE 3.81 (H) 2024    CREATININE 5.50 (H) 2024    CREATININE 5.50 (H) 2024        Patient weight is as follows:   Vitals:    24 0500   Weight: 75.4 kg (166 lb 3.6 oz)       Cultures:  Susceptibility data for the encounter in last 14 days.  Collected Specimen Info Organism Clindamycin Erythromycin Oxacillin Rifampin Tetracycline Trimethoprim/Sulfamethoxazole Vancomycin   24 Blood culture from Peripheral Venipuncture Methicillin Resistant Staphylococcus aureus (MRSA)  R  R  R  I  R  S  S   24 Blood culture from Peripheral Venipuncture Staphylococcus aureus               I/O last 3 completed shifts:  In: 756.3 (10.7 mL/kg) [I.V.:256.3 (3.6 mL/kg); IV Piggyback:500]  Out: 695 (9.8 mL/kg) [Other:695]  Dosing Weight: 70.9 kg   I/O during current shift:  I/O this shift:  In: 87.9 [I.V.:37.9; IV Piggyback:50]  Out: 100 [Other:100]    Temp (24hrs), Av.4 °C (97.5 °F), Min:36 °C (96.8 °F), Max:36.7 °C (98.1 °F)      Assessment/Plan    Above goal random/trough level. Vancomycin will be held at this time until level returns to therapeutic range.   The next level will be obtained on 24 at AM labs. May be  obtained sooner if clinically indicated.   Will continue to monitor renal function daily while on vancomycin and order serum creatinine at least every 48 hours if not already ordered.  Follow for continued vancomycin needs, clinical response, and signs/symptoms of toxicity.       Giana Fuller, PharmD

## 2024-07-26 NOTE — H&P (VIEW-ONLY)
Inpatient consult to [facility]  Consult performed by: AMARILYS Hinojosa-CNP  Consult ordered by: Dayton Young MD  Reason for consult: for consideration of TV veggetation removal        History Of Present Illness:    Edwar Hemphill is a 39 y.o. male presenting to MICU for septic shock likely 2/2 right groin tunneled HD line with bacteremia with Staph aureus in blood. Pt with multiple dialysis line placement by IR, found to be tachycardic and hypotensive at dialysis. 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 He had a TTE 7/23 which showed TV with likely mobile echo densities with severe TR that was reportedly confirmed by POLI on 7/24. He remains in septic shock, septic emboli, + blood cultures as of 7/25, low platelets [id number]. Vascular surgery involved now in setting of infected tunneled HD line and need for alternative access as well as potential for surgical removal of tunneled line with concern for clot formation along length. They recommended Cardiac surgery consult for possible open tip removal which is pending review of images by Dr. Jones and EVLS for possible Vegectomy.      Last Recorded Vitals:  Vitals:    07/26/24 1154 07/26/24 1200 07/26/24 1300 07/26/24 1500   BP:  83/51 (!) 87/47 85/59   Pulse:  93 96 101   Resp:  12 12 17   Temp: 36 °C (96.8 °F)      TempSrc: Temporal      SpO2:  94%     Weight:       Height:           Last Labs:  CBC - 7/26/2024:  4:27 AM  15.4 8.5 44    26.3      CMP - 7/26/2024:  4:27 AM  8.3 6.2 11 --- 0.8   2.7 2.6 6 137      PTT - 7/24/2024:  3:45 AM  1.4   15.4 34      Troponin I, High Sensitivity   Date/Time Value Ref Range Status   05/28/2024 04:16 AM 18 0 - 20 ng/L Final     Troponin I, High Sensitivity ([facility])   Date/Time Value Ref Range Status   07/22/2024 10:24 PM 63 (H) 0 - 53 ng/L Final   07/22/2024 10:45 AM 6 0 - 53 ng/L Final     Troponin I   Date/Time Value Ref Range Status   09/13/2023 01:51 PM 22 (H) 0 - 20 ng/L Final     Comment:     .  Less than 99th percentile of normal range cutoff-  Female and children under 18 years old <14 ng/L; Male <21 ng/L: Negative  Repeat testing should be performed if clinically indicated.   .  Female and children under 18 years old 14-50 ng/L; Male 21-50 ng/L:  Consistent with possible cardiac damage and possible increased clinical   risk. Serial measurements may help to assess extent of myocardial damage.   .  >50 ng/L: Consistent with cardiac damage, increased clinical risk and  myocardial infarction. Serial measurements may help assess extent of   myocardial damage.   .   NOTE: Children less than 1 year old may have higher baseline troponin   levels and results should be interpreted in conjunction with the overall   clinical context.   .  NOTE: Troponin I testing is performed using a different   testing methodology at [facility] than at other   [facility]. Direct result comparisons should only   be made within the same method.       Hemoglobin A1C   Date/Time Value Ref Range Status   03/30/2024 05:49 AM 6.8 (H) 4.3 - 5.6 % Final     Comment:     American Diabetes Association guidelines indicate that patients with HgbA1c in the range 5.7-6.4% are at increased risk for development of diabetes, and intervention by lifestyle modification may be beneficial. HgbA1c greater or equal to 6.5% is considered diagnostic of diabetes.   09/14/2023 10:14 AM 5.2 % Final     Comment:          Diagnosis of Diabetes-Adults   Non-Diabetic: < or = 5.6%   Increased risk for developing diabetes: 5.7-6.4%   Diagnostic of diabetes: > or  = 6.5%  .       Monitoring of Diabetes                Age (y)     Therapeutic Goal (%)   Adults:          >18           <7.0   Pediatrics:    13-18           <7.5                   7-12           <8.0                   0- 6            7.5-8.5   American Diabetes Association. Diabetes Care 33(S1), Jan 2010.     08/07/2023 10:12 PM 5.8 (A) % Final     Comment:          Diagnosis of Diabetes-Adults   Non-Diabetic: < or = 5.6%   Increased risk for developing diabetes: 5.7-6.4%   Diagnostic of diabetes: > or = 6.5%  .       Monitoring of Diabetes                Age (y)     Therapeutic Goal (%)   Adults:          >18           <7.0   Pediatrics:    13-18           <7.5                   7-12           <8.0                   0- 6            7.5-8.5   American Diabetes Association. Diabetes Care 33(S1), Jan 2010.     10/15/2022 03:35 PM 7.3 (H) 4.3 - 5.6 % Final     Comment:     American Diabetes Association guidelines indicate that patients with HgbA1c in the range 5.7-6.4% are at increased risk for development of diabetes, and intervention by lifestyle modification may be beneficial. HgbA1c greater or equal to 6.5% is considered diagnostic of diabetes.     VLDL   Date/Time Value Ref Range Status   11/11/2022 01:16 PM 5 0 - 40 mg/dL Final      Last I/O:  I/O last 3 completed shifts:  In: 756.3 (10.7 mL/kg) [I.V.:256.3 (3.6 mL/kg); IV Piggyback:500]  Out: 695 (9.8 mL/kg) [Other:695]  Dosing Weight: 70.9 kg     Past Cardiology Tests (Last 3 Years):  EKG:  Electrocardiogram, 12-lead PRN ACS symptoms 07/24/2024    Echo:  Transesophageal Echo (POLI) 07/24/2024  Transthoracic Echo (TTE) Limited 07/23/2024  Transesophageal Echo (POLI) 05/14/2024  Transthoracic Echo Complete 05/03/2024  Transesophageal Echo (POLI) 01/19/2024  Transthoracic Echo (TTE) Complete 01/16/2024  Transthoracic Echo (TTE) Complete 11/14/2023    Ejection Fractions:  EF   Date/Time Value Ref Range Status   07/24/2024 05:20 PM 40 %    07/23/2024 09:43 AM 35 %     01/16/2024 04:23 PM 68         Past Medical History:  He has a past medical history of Chronic kidney disease, Diabetes mellitus (Multi), ESRD (end stage renal disease) (Multi), Essential (primary) hypertension (05/26/2017), GERD (gastroesophageal reflux disease), Hyperlipidemia, and Hypothyroidism.    Past Surgical History:  He has a past surgical history that includes CT angio aorta and bilateral iliofemoral runoff w and or wo IV contrast (02/09/2023); IR CVC exchange (11/13/2023); Toe amputation (Left, 02/17/2023); Leg amputation, lower tibia/fibula (Left, 07/05/2023); Leg amputation through knee (Left, 07/08/2023); Wound debridement (Left, 07/26/2023); Wound debridement (Left, 08/02/2023); Arm amputation at elbow (Right, 05/2021); AV fistula placement w/ PTFE; IR CVC PICC (10/19/2023); and IR CVC PICC (2/28/2022).      Social History:  He reports that he has been smoking cigarettes. He has a 3.8 pack-year smoking history. He has never used smokeless tobacco. No history on file for alcohol use and drug use.    Family History:  Family History   Problem Relation Name Age of Onset    Other (DIABETES DUE TO UNDERLYING CONDITION WITH MICROALBUMINURIA) Father      Other (DIABETES DUE TO UNDERLYING CONDITION WITH MICROALBUMINURIA [Other]) Other AUNT     Other (DIABETES DUE TO UNDERLYING CONDITION WITH MICROALBUMINURIA [Other]) Other GP         Allergies:  Cashew nut and Daptomycin    Inpatient Medications:  Scheduled medications   Medication Dose Route Frequency    atorvastatin  40 mg oral Nightly    B complex-vitamin C-folic acid  1 capsule oral Daily    ceftaroline  400 mg intravenous q8h    cholecalciferol  50,000 Units oral Once per day on Monday Thursday    epoetin tyson or biosimilar  10,000 Units subcutaneous Once per day on Monday Wednesday Friday    folic acid  1 mg oral Daily    heparin        hydrocortisone sodium succinate  50 mg intravenous q6h    insulin glargine  4 Units subcutaneous Nightly    insulin  lispro  0-10 Units subcutaneous TID    levothyroxine  125 mcg oral Daily before breakfast    melatonin  6 mg oral Nightly    midodrine  10 mg oral q8h    nystatin  1 Application Topical BID    pantoprazole  40 mg oral Daily before breakfast    thiamine  200 mg oral Daily    [Held by provider] vancomycin  500 mg intravenous q12h     PRN medications   Medication    acetaminophen    Or    acetaminophen    Or    acetaminophen    benzocaine    dextrose    dextrose    diphenhydrAMINE    fentaNYL    glucagon    glucagon    heparin    lidocaine    midazolam    vancomycin     Continuous Medications   Medication Dose Last Rate    norepinephrine  0-0.2 mcg/kg/min 0.04 mcg/kg/min (07/26/24 1517)    PrismaSol 4/2.5  25 mL/kg/hr 25 mL/kg/hr (07/26/24 0111)     Outpatient Medications:  Current Outpatient Medications   Medication Instructions    atorvastatin (LIPITOR) 40 mg, oral, Nightly    B complex-vitamin C-folic acid (Nephrocaps) 1 mg capsule 1 capsule, oral, Daily    cholecalciferol (VITAMIN D-3) 50,000 Units, oral, 2 times weekly, On Monday and Thursday    epoetin tyson-epbx (RETACRIT) 10,000 Units, subcutaneous, 3 times weekly    folic acid (FOLVITE) 1 mg, oral, Daily    insulin lispro (HUMALOG) 0-15 Units, subcutaneous, 3 times daily (morning, midday, late afternoon), Take as directed per insulin instructions.    Lactobacillus acidophilus 1 billion cell tablet 1 tablet, oral, 2 times daily    Lantus U-100 Insulin 8 Units, subcutaneous, Nightly, Take as directed per insulin instructions.    levothyroxine (SYNTHROID, LEVOXYL) 125 mcg, oral, Daily before breakfast    loperamide (IMODIUM) 4 mg, oral, Every 2 hour PRN    melatonin 6 mg, oral, Nightly    midodrine (PROAMATINE) 10 mg, oral, Every 4 hours PRN    oxyCODONE-acetaminophen (Percocet) 5-325 mg tablet 1 tablet, oral, Every 6 hours PRN    pantoprazole (PROTONIX) 40 mg, oral, Daily before breakfast, Do not crush, chew, or split.    sevelamer carbonate (RENVELA) 2,400 mg,  oral, 3 times daily (morning, midday, late afternoon)    sodium zirconium cyclosilicate (LOKELMA) 5 g, oral, 3 times weekly, On Tuesday, Thursday and Saturday    thiamine (VITAMIN B-1) 200 mg, oral, Daily     CT chest abdomen pelvis w IV contrast 7/25/24  IMPRESSION:  CHEST:  1. Interval development of bilateral pulmonary nodular opacities  several of them showing cavitation. Findings are concerning for  septic embolism/cavitary pneumonia.  2. Bilateral pleural effusions.  3. Right femoral central venous catheter with tip terminating in the  right ventricle. Hypodense thrombus around the catheter in the right  atrium correlating with the vegetation seen on prior echocardiogram.  4. Severely attenuated superior vena cava, bilateral brachiocephalic,  subclavian and internal jugular veins with the calcifications in the  brachiocephalic veins with extensive collaterals in the chest wall  likely representing chronic thrombosis.      ABDOMEN-PELVIS:  1. Severely attenuated inferior vena cava with multiple  calcifications, consistent with chronic IVC thrombosis.  2. Interval development of a T10 vertebral body fracture.  Re-demonstration of severe osseous changes throughout the  thoracolumbar spine. Findings are likely secondary to renal  osteodystrophy.  3. Diffuse thickening of the colon and rectum with the pericolonic  stranding consistent with the proctocolitis which can be  inflammatory/infectious in etiology. Correlate with clinical symptoms.  4. Diffuse mesenteric haziness and anasarca likely representing fluid  overload.  5. Bilateral atrophied kidneys with hypoenhancement, representative  of medical renal disease.    Transesophageal Echo (POLI) Complete With Doppler And Color 7/24/24   CONCLUSIONS:   1. Left ventricular ejection fraction is moderately decreased, by visual estimate at 40%.   2. There is global hypokinesis of the left ventricle with minor regional variations.   3. Mildly enlarged right ventricle.    4. There is moderately reduced right ventricular systolic function.   5. Agitated saline contrast study was positive for an intracardiac shunt (moderate sized PFO).   6. There is a large vegetation that circumferentially encases the femoral TDC ( catheter). There is also involvement of the TV with likely destruction of the septal leaflet of the TV and resultant severe TR. ( the vegatation surrounding the catheter measures at least 1.9cm x 2.5cm.   7. The tricuspid valve is abnormal.   8. Severe tricuspid regurgitation visualized.   9. Trielaflet AV with no paravalular abscess or AI, however there is a small mobile echodensity on the LVOT side of the AV with independent motion whcih could be consitent with a small vegetation vs degenerative changes.  10. Moderately sized patent foramen ovale.  11. No left atrial thrombus.  13. A bubble study using agitated saline was performed. Bubble study is positive. A moderate PFO (11-20 bubbles) was demonstrated.  14. Compared with study dated 7/23/2024, Catheter associated vegetation and anatomy of TV are better visualized on today's study. Otherwise, there are no gross changes from the previous surface echo.      Transthoracic Echo (TTE) Limited With Doppler, Color And Contrast  7/23/24  CONCLUSIONS:   1. Left ventricular ejection fraction is moderately decreased, calculated by Briseno's biplane at 35%.   2. There is global hypokinesis of the left ventricle with minor regional variations.   3. No left ventricular thrombus visualized.   4. There is reduced right ventricular systolic function.   5. Several small mobile echodensities noted within the RA likely attached to dialysis catheter and thickened TV with mobile echo densities associated with the TV concerning for possible vegetations with severe TR. Not well seen. Recommend POLI to further assess if clinically indicated.   6. There is moderate mitral annular calcification.   7. The tricuspid valve is abnormal.   8. Mildly  elevated RVSP.   9. Severe tricuspid regurgitation visualized.  10. TV with likely mobile echodensities associated tih the leaflets though not well seen with severe TR. RVSP may be underestimated due to severity of TR.  11. There is reversed systolic hepatic vein flow.  13. Compared with the prior exam POLI from 5/14/2024 ( [facility]) the TV was previously normal with only trivial TR. The dialysis catheter seen within the RA is new since the prior POLI and the mobile echodensities and severe TR are new as well. The LV systolic function was already mild to moderately reduced at that time.      Physical Exam:  Constitutional: no acute distress  Neuro: A/O x4, no gross deficits   Psych: flat affect, frustrated by seen by mutiple providers. He kept his eye closed during assessment and did not engage    Cardiac: ST on tele  Head/Neck: normal, left internal jugular line   Pulmonary: unlabored respirations, on RA   Abdomen: soft, non distended, non tender  Skin: warm and dry overall    Extremities: +2 RLE edema, xerotic LE skin, L AKA, RUE amputation, femoral CVC in R groin. Pt on CVVH     Assessment/Plan   Edwar Hemphill is a 39 y.o. male presenting to MICU for septic shock likely 2/2 right groin tunneled HD line with bacteremia with Staph aureus in blood.     TV vegetation     POLI: large vegetation that circumferentially encases the femoral TDC ( catheter). There is also involvement of the TV with likely destruction of the septal leaflet of the TV and resultant severe TR. ( the vegatation surrounding the catheter measures at least 1.9cm x 2.5cm.    VS:  BP 90/58 (66)  RR 15 99% on RA   On CVVH  Levo 12.8 ml/hr       - Tentatively planning for vegectomy with Dr. Dennis Monday 7/29 pending full image review  - Pt can have light breakfast around 7 am and NPO after. Case order placed.  - continue all other treatment per critical care team.   - Case and plan d/w Dr Hunter and Dr. Dennis            Peripheral IV 07/22/24 18  G Distal;Left;Upper;Anterior Arm (Active)   Site Assessment Clean;Dry;Intact 07/26/24 0800   Dressing Type Transparent 07/26/24 0800   Line Status Infusing 07/26/24 0800   Dressing Status Clean;Dry;Occlusive 07/26/24 0800   Number of days: 4       Peripheral IV 07/22/24 16 G Left;Anterior External Jugular (Active)   Site Assessment Clean;Dry;Intact 07/26/24 0800   Dressing Type Transparent 07/26/24 0800   Line Status Infusing;Blood return noted 07/26/24 0800   Dressing Status Clean;Occlusive;Dry 07/26/24 0800   Number of days: 4       Hemodialysis Cath Right Femoral (Active)   Line Necessity Continuous renal replacement therapy 07/26/24 0800   Line Necessity Reviewed With Dr Young 07/26/24 0800   Site Assessment Clean;Dry;Intact 07/26/24 0800   Dressing Type Transparent;Antimicrobial patch 07/26/24 0800   Dressing Status Clean;Dry;Occlusive 07/26/24 0800   Number of days:        Code Status:  Full Code          Mahadr Martinez, APRN-CNP  [facility]   Day: 35001/Haiku/Night HHVI [id number]

## 2024-07-26 NOTE — PROGRESS NOTES
Follow-Up Palliative Medicine Progress Note   Palliative Medicine following for:  Complex medical decision making, symptom management, patient/family support    History obtained from chart review including ED note, H&P, patient's daily progress notes, review of lab/test results, and discussion with primary team and bedside RN.    Subjective    History of Present Illness  Edwar Hemphill is a 39 y.o. male on day 4 of admission presenting with Septic shock (Multi). 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 GI is consulted for beginning anticoagulation with hx of UGI bleed. IR, ID, Cardiology, and Vascular Medicine all consulted and on board. Patient being treated for septic shock requiring pressors (Levophed) initially and infective endocarditis. Patient had a TTE revealing EF 40%, global hypokinesis of LV, enlarged RV and large vegetation circumferentially encasing the femoral TDC. Pt is currently on CVVH. Palliative Medicine was consulted yesterday for GOC and initial consult completed. Following up today for ongoing GOC and establishing rapport with patient and mother. Awaiting Dr. Kai Hunter vascular surgery recommendations and if patient is surgical candidate. Pt smiling today and interactive during visit. Pt reports eating and drinking well. Pt denies any uncontrolled pain. Pt reports he slept well overnight. Pt asking about his hearing bilaterally and would like checked when possible. Primary team updated. Support and empathy was provided throughout the encounter. Provided reflective listening and presence.      Symptoms  [place] Symptom Assessment System  0-10 (Numeric) Pain Score: 0 - No pain  Pain: Pt denies.  Dyspnea: Pt denies.  Fatigue: Pt reports  "severe.  Insomnia: Pt denies.  Drowsiness: Pt denies.  Constipation: Pt denies.  Nausea: Pt denies.  Appetite: Pt denies. Pt reports good appetite.   Anxiety: Pt denies.  Depression: Pt denies.     BM in last 48 hours? Yes- 07/26/2024    Palliative Medicine Social History:  The patient is . No children. The patient lives in [facility]. The patient has been on disabled for years and on hemodialysis for approximately 10 years. Pt denies use of alcohol, tobacco or drugs. The pt's mobility requires assistive device power wheelchair (powerchair/scooter). The patient spends most of the day. Pt sees their PCP and other specialists regularly. Hobbies were cooking breakfast for his mom, cooking, living life fully and having complete independence, but since illness has progressed, pt is no longer able. For enjoyment, the pt loves listening to music and riding around in his powerchair/scooter. Pt highly values his life and living fully. Coping methods are his nani and strong connection with God and family support. Denies any safety concerns.     Spiritual History:   Are you spiritual or Restorationist? Yes  What’s your nani background? Strong Nani and believes in God, has a very personal relationship with God, no particular organized Restoration  During difficult times in your life, what have you relied on for strength? Nani and Family     Music History:  Do you enjoy music? Yes What type of music do you enjoy? All kinds, especially R&B, pt is  and cha, pt loves music and very important part of his life and what brings him andry    Objective    Last Recorded Vitals  BP 83/51   Pulse 93   Temp 36 °C (96.8 °F) (Temporal)   Resp 12   Ht 1.753 m (5' 9.02\")   Wt 75.4 kg (166 lb 3.6 oz)   SpO2 94%   BMI 24.54 kg/m²    Intake/Output last 3 Shifts:  I/O last 3 completed shifts:  In: 756.3 (10.7 mL/kg) [I.V.:256.3 (3.6 mL/kg); IV Piggyback:500]  Out: 695 (9.8 mL/kg) [Other:695]  Dosing Weight: " 70.9 kg     Physical Exam  Vitals and nursing note reviewed.   Constitutional:       General: He is not in acute distress.     Appearance: He is ill-appearing. He is not toxic-appearing.   HENT:      Head: Normocephalic and atraumatic.      Right Ear: Decreased hearing noted.      Left Ear: Decreased hearing noted.      Nose: Nose normal.      Mouth/Throat:      Mouth: Mucous membranes are moist.   Eyes:      General: No scleral icterus.  Cardiovascular:      Rate and Rhythm: Normal rate and regular rhythm.   Pulmonary:      Effort: Pulmonary effort is normal. No respiratory distress.      Breath sounds: Normal breath sounds.   Abdominal:      General: There is no distension.      Palpations: Abdomen is soft.      Tenderness: There is no abdominal tenderness.   Musculoskeletal:      Comments: Left leg amputated above the knee (Left AKA)  Right arm amputated above elbow   Skin:     General: Skin is warm and dry.   Neurological:      Mental Status: He is alert and oriented to person, place, and time.      Motor: Weakness present.   Psychiatric:         Attention and Perception: Attention normal.         Mood and Affect: Mood normal.         Speech: Speech normal.         Behavior: Behavior normal. Behavior is cooperative.         Thought Content: Thought content normal.         Cognition and Memory: Cognition and memory normal.         Judgment: Judgment normal.     Relevant Results   Results for orders placed or performed during the hospital encounter of 07/22/24 (from the past 24 hour(s))   Vancomycin   Result Value Ref Range    Vancomycin 30.7 (H) 5.0 - 20.0 ug/mL   Blood Culture    Specimen: Peripheral Venipuncture; Blood culture   Result Value Ref Range    Blood Culture       Identification and susceptibility testing to follow    Gram Stain Gram positive cocci, clusters (AA)    BUN   Result Value Ref Range    Urea Nitrogen 23 6 - 23 mg/dL   Calcium, ionized   Result Value Ref Range    POCT Calcium, Ionized 1.17  1.1 - 1.33 mmol/L   Creatinine, Serum   Result Value Ref Range    Creatinine 3.32 (H) 0.50 - 1.30 mg/dL    eGFR 23 (L) >60 mL/min/1.73m*2   Electrolyte panel   Result Value Ref Range    Sodium 131 (L) 136 - 145 mmol/L    Potassium 4.1 3.5 - 5.3 mmol/L    Chloride 96 (L) 98 - 107 mmol/L    Bicarbonate 21 21 - 32 mmol/L    Anion Gap 18 10 - 20 mmol/L   Phosphorus   Result Value Ref Range    Phosphorus 3.2 2.5 - 4.9 mg/dL   Blood Gas Venous Full Panel   Result Value Ref Range    POCT pH, Venous 7.36 7.33 - 7.43 pH    POCT pCO2, Venous 41 41 - 51 mm Hg    POCT pO2, Venous 29 (L) 35 - 45 mm Hg    POCT SO2, Venous 33 (L) 45 - 75 %    POCT Oxy Hemoglobin, Venous 32.1 (L) 45.0 - 75.0 %    POCT Hematocrit Calculated, Venous 28.0 (L) 41.0 - 52.0 %    POCT Sodium, Venous 130 (L) 136 - 145 mmol/L    POCT Potassium, Venous 4.4 3.5 - 5.3 mmol/L    POCT Chloride, Venous 98 98 - 107 mmol/L    POCT Ionized Calicum, Venous 1.18 1.10 - 1.33 mmol/L    POCT Glucose, Venous 212 (H) 74 - 99 mg/dL    POCT Lactate, Venous 1.8 0.4 - 2.0 mmol/L    POCT Base Excess, Venous -2.1 (L) -2.0 - 3.0 mmol/L    POCT HCO3 Calculated, Venous 23.2 22.0 - 26.0 mmol/L    POCT Hemoglobin, Venous 9.4 (L) 13.5 - 17.5 g/dL    POCT Anion Gap, Venous 13.0 10.0 - 25.0 mmol/L    Patient Temperature 37.0 degrees Celsius    FiO2 21 %   POCT GLUCOSE   Result Value Ref Range    POCT Glucose 213 (H) 74 - 99 mg/dL   Blood Gas Venous Full Panel   Result Value Ref Range    POCT pH, Venous 7.38 7.33 - 7.43 pH    POCT pCO2, Venous 36 (L) 41 - 51 mm Hg    POCT pO2, Venous 42 35 - 45 mm Hg    POCT SO2, Venous 61 45 - 75 %    POCT Oxy Hemoglobin, Venous 59.2 45.0 - 75.0 %    POCT Hematocrit Calculated, Venous 27.0 (L) 41.0 - 52.0 %    POCT Sodium, Venous 129 (L) 136 - 145 mmol/L    POCT Potassium, Venous 4.1 3.5 - 5.3 mmol/L    POCT Chloride, Venous 99 98 - 107 mmol/L    POCT Ionized Calicum, Venous 1.22 1.10 - 1.33 mmol/L    POCT Glucose, Venous 272 (H) 74 - 99 mg/dL    POCT  Lactate, Venous 1.6 0.4 - 2.0 mmol/L    POCT Base Excess, Venous -3.4 (L) -2.0 - 3.0 mmol/L    POCT HCO3 Calculated, Venous 21.3 (L) 22.0 - 26.0 mmol/L    POCT Hemoglobin, Venous 8.9 (L) 13.5 - 17.5 g/dL    POCT Anion Gap, Venous 13.0 10.0 - 25.0 mmol/L    Patient Temperature 37.0 degrees Celsius    FiO2 21 %   CBC   Result Value Ref Range    WBC 23.4 (H) 4.4 - 11.3 x10*3/uL    nRBC 0.0 0.0 - 0.0 /100 WBCs    RBC 2.83 (L) 4.50 - 5.90 x10*6/uL    Hemoglobin 8.3 (L) 13.5 - 17.5 g/dL    Hematocrit 23.6 (L) 41.0 - 52.0 %    MCV 83 80 - 100 fL    MCH 29.3 26.0 - 34.0 pg    MCHC 35.2 32.0 - 36.0 g/dL    RDW 18.2 (H) 11.5 - 14.5 %    Platelets 53 (L) 150 - 450 x10*3/uL   POCT GLUCOSE   Result Value Ref Range    POCT Glucose 257 (H) 74 - 99 mg/dL   CBC and Auto Differential   Result Value Ref Range    WBC 15.4 (H) 4.4 - 11.3 x10*3/uL    nRBC 0.0 0.0 - 0.0 /100 WBCs    RBC 2.94 (L) 4.50 - 5.90 x10*6/uL    Hemoglobin 8.5 (L) 13.5 - 17.5 g/dL    Hematocrit 26.3 (L) 41.0 - 52.0 %    MCV 90 80 - 100 fL    MCH 28.9 26.0 - 34.0 pg    MCHC 32.3 32.0 - 36.0 g/dL    RDW 19.0 (H) 11.5 - 14.5 %    Platelets 44 (L) 150 - 450 x10*3/uL    Neutrophils % 92.0 40.0 - 80.0 %    Immature Granulocytes %, Automated 1.6 (H) 0.0 - 0.9 %    Lymphocytes % 4.4 13.0 - 44.0 %    Monocytes % 1.8 2.0 - 10.0 %    Eosinophils % 0.0 0.0 - 6.0 %    Basophils % 0.2 0.0 - 2.0 %    Neutrophils Absolute 14.19 (H) 1.20 - 7.70 x10*3/uL    Immature Granulocytes Absolute, Automated 0.24 0.00 - 0.70 x10*3/uL    Lymphocytes Absolute 0.68 (L) 1.20 - 4.80 x10*3/uL    Monocytes Absolute 0.28 0.10 - 1.00 x10*3/uL    Eosinophils Absolute 0.00 0.00 - 0.70 x10*3/uL    Basophils Absolute 0.03 0.00 - 0.10 x10*3/uL   Comprehensive Metabolic Panel   Result Value Ref Range    Glucose 266 (H) 74 - 99 mg/dL    Sodium 133 (L) 136 - 145 mmol/L    Potassium 3.9 3.5 - 5.3 mmol/L    Chloride 98 98 - 107 mmol/L    Bicarbonate 25 21 - 32 mmol/L    Anion Gap 14 10 - 20 mmol/L    Urea  Nitrogen 19 6 - 23 mg/dL    Creatinine 2.52 (H) 0.50 - 1.30 mg/dL    eGFR 32 (L) >60 mL/min/1.73m*2    Calcium 8.3 (L) 8.6 - 10.6 mg/dL    Albumin 2.6 (L) 3.4 - 5.0 g/dL    Alkaline Phosphatase 137 (H) 33 - 120 U/L    Total Protein 6.2 (L) 6.4 - 8.2 g/dL    AST 11 9 - 39 U/L    Bilirubin, Total 0.8 0.0 - 1.2 mg/dL    ALT 6 (L) 10 - 52 U/L   Magnesium   Result Value Ref Range    Magnesium 2.28 1.60 - 2.40 mg/dL   Phosphorus   Result Value Ref Range    Phosphorus 2.7 2.5 - 4.9 mg/dL   Calcium, ionized   Result Value Ref Range    POCT Calcium, Ionized 1.13 1.1 - 1.33 mmol/L   Vancomycin   Result Value Ref Range    Vancomycin 27.4 (H) 5.0 - 20.0 ug/mL   POCT GLUCOSE   Result Value Ref Range    POCT Glucose 300 (H) 74 - 99 mg/dL   POCT GLUCOSE   Result Value Ref Range    POCT Glucose 317 (H) 74 - 99 mg/dL      Allergies  Cashew nut and Daptomycin    Medications  Scheduled medications  atorvastatin, 40 mg, oral, Nightly  B complex-vitamin C-folic acid, 1 capsule, oral, Daily  ceftaroline, 400 mg, intravenous, q8h  cholecalciferol, 50,000 Units, oral, Once per day on Monday Thursday  epoetin tyson or biosimilar, 10,000 Units, subcutaneous, Once per day on Monday Wednesday Friday  folic acid, 1 mg, oral, Daily  hydrocortisone sodium succinate, 50 mg, intravenous, q6h  insulin glargine, 4 Units, subcutaneous, Nightly  insulin lispro, 0-10 Units, subcutaneous, TID  levothyroxine, 125 mcg, oral, Daily before breakfast  melatonin, 6 mg, oral, Nightly  midodrine, 10 mg, oral, q8h  nystatin, 1 Application, Topical, BID  pantoprazole, 40 mg, oral, Daily before breakfast  thiamine, 200 mg, oral, Daily  [Held by provider] vancomycin, 500 mg, intravenous, q12h    Continuous medications  norepinephrine, 0-0.2 mcg/kg/min, Last Rate: 0.04 mcg/kg/min (07/26/24 0800)  PrismaSol 4/2.5, 25 mL/kg/hr, Last Rate: 25 mL/kg/hr (07/26/24 0111)    PRN medications  PRN medications: acetaminophen **OR** acetaminophen **OR** acetaminophen, benzocaine,  dextrose, dextrose, diphenhydrAMINE, fentaNYL, glucagon, glucagon, lidocaine, midazolam, vancomycin     Assessment/Plan    Edwar Hemphill is a 39 y.o. male on day 4 of admission presenting with Septic shock (Multi). 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 GI is consulted for beginning anticoagulation with hx of UGI bleed. IR, ID, Cardiology, and Vascular Medicine all consulted and on board. Patient being treated for septic shock requiring pressors (Levophed) initially and infective endocarditis. Patient had a TTE revealing EF 40%, global hypokinesis of LV, enlarged RV and large vegetation circumferentially encasing the femoral TDC. Pt is currently on CVVH. Palliative Medicine was consulted yesterday for GOC and initial consult completed. Following up today for ongoing GOC and establishing rapport with patient and mother. Awaiting Dr. Kai Hunter vascular surgery recommendations and if patient is surgical candidate. Pt smiling today and interactive during visit. Pt reports eating and drinking well. Pt denies any uncontrolled pain. Pt reports he slept well overnight. Pt asking about his hearing bilaterally and would like checked when possible. Primary team updated. Support and empathy was provided throughout the encounter. Provided reflective listening and presence.      Palliative Performance Scale (PPS): 20%    #Complex Medical Decision Making   #Goals of Care  #Advance Care Planning   - Code status: FULL CODE  - Surrogate decision maker: Pt's mother Shanthi Hemphill [phone].  - Goals are survival and time based   - Plan for patient to complete Advanced Directives with social work prior to discharge. Pt wishes to designate his mother Shanthi Hemphill HCPOA and agreeable  to sign forms. Update: Completed today with Palliative SW Veronica Membreno.  - Copies of Advanced Directives to be placed on file      #Septic Shock  #Infective Endocarditis  #End Stage Renal Disease  #Heart Failure reduced EF  #Fatigue  #NICM    - Patient reports trouble hearing both ears, recommend eval with otoscope asap and cerumen removal if impaction. ENT consult if needed for acute hearing loss if no visible obstruction recommended. Dr. Irwni updated.   - Palliative SW to assist in HCPOA document completion when able prior to discharge- HCPOA documents completed today with patient, pt's mother Shanthi Hemphill and Palliative SW Veronica Membreno.  - Ongoing GOC and Palliative Medicine will continue ongoing support for patient and family as pt critically ill and guarded prognosis at this time.   - Consult music therapy asap per pt and family request.      #Psychosocial Support  - Consult Music Therapy  - Consult Art Therapy  - Palliative Care SW Support     Plan of Care discussed with: Updated MD Dr. Tucker Irwin and bedside RN Marah Torres on goals of care decision, medication adjustments, and code status.     Medical Decision Making was high level due to high complexity of problems, extensive data review, and high risk of management/treatment.     - Septic Shock and Infective Endocarditis posing threat to life and function  - Reviewed external notes from recent [facility] admissions for GI Bleed and repeat hospitalizations 06/03/2024, 06/07/2024, 06/09/2024, [facility] Admission 06/19/2024- 06/20/2024, [facility] Hospitalization 05/28/2024, and 04/30/2024-05/26/2024.   - Discussion of management with primary team  - Drug therapy requiring intensive monitoring for toxicity: IV Vanco, IV Teflaro, IV Levophed, IV Vasopressin  - Decision regarding hospitalization or escalation of hospital-level care: ICU setting, pt in MICU and critically ill at this time    Thank you for allowing us to participate in the care of this  patient. Palliative will continue to follow as needed. Palliative Medicine is available Monday-Friday, 8a-6p. Please contact team with any questions or concerns.  Team pager [phone]  Kyleigh Campa CNP (on EPIC secure chat)  Palliative Medicine Nurse Practitioner   [email or web address]      AMARILYS Blair-CNP

## 2024-07-26 NOTE — CARE PLAN
The patient's goals for the shift include  none    The clinical goals for the shift include pt will remain free of injury during shift    Over the shift, the patient did not make progress toward the following goals. Barriers to progression include refuses interventions. Recommendations to address these barriers include   Problem: Skin  Goal: Decreased wound size/increased tissue granulation at next dressing change  Outcome: Progressing  Flowsheets (Taken 7/25/2024 2023)  Decreased wound size/increased tissue granulation at next dressing change:   Protective dressings over bony prominences   Promote sleep for wound healing  Goal: Participates in plan/prevention/treatment measures  Outcome: Progressing  Flowsheets (Taken 7/25/2024 2023)  Participates in plan/prevention/treatment measures: Elevate heels  Goal: Prevent/manage excess moisture  Outcome: Progressing  Flowsheets (Taken 7/25/2024 2023)  Prevent/manage excess moisture:   Monitor for/manage infection if present   Cleanse incontinence/protect with barrier cream  Goal: Prevent/minimize sheer/friction injuries  Outcome: Progressing  Flowsheets (Taken 7/25/2024 2023)  Prevent/minimize sheer/friction injuries: Turn/reposition every 2 hours/use positioning/transfer devices  Goal: Promote/optimize nutrition  Outcome: Progressing  Flowsheets (Taken 7/25/2024 2023)  Promote/optimize nutrition:   Consume > 50% meals/supplements   Monitor/record intake including meals  Goal: Promote skin healing  Outcome: Progressing  Flowsheets (Taken 7/25/2024 2023)  Promote skin healing:   Turn/reposition every 2 hours/use positioning/transfer devices   Protective dressings over bony prominences   Assess skin/pad under line(s)/device(s)   .

## 2024-07-26 NOTE — PROGRESS NOTES
Edwar Hemphill is a 39 y.o. male on day 4 of admission presenting with Septic shock (Multi).    Subjective   Interval History:   Reluctant historian today as trying to sleep.  Denied pain, SOB, difficulty breathing.  Mother was present as had spent the night with him.          Objective   Range of Vitals (last 24 hours)  Heart Rate:  []   Temp:  [36 °C (96.8 °F)-36.7 °C (98.1 °F)]   Resp:  [11-25]   BP: ()/(41-90)   SpO2:  [91 %-100 %]   Daily Weight  07/25/24 : 75.4 kg (166 lb 3.6 oz)    Body mass index is 24.54 kg/m².    Physical Exam    Constitutional: WD, NAD  Eyes: PERRL, anicteric sclera. No conjunctival injections  ENT:  MMM, no oral lesions noted, no thrush   NECK: Supple, no LAD  LUNGS: Breathing unlabored.  Clear in all lung fields.  CVS: RRR, S1 & S2 normal.  RAMIRO LLSB  ABD: Soft, obese NT / ND,   EXT: Amputation stumps well healed. No drainage  SKIN:  No unusual lesions or rashes.     Antibiotics  ceftaroline (Teflaro) IV in 50 mL D5W  vancomycin - 500 mg/100 mL    Relevant Results  Labs  Results from last 72 hours   Lab Units 07/26/24 0427 07/25/24  2112 07/25/24  0336 07/24/24  1522   WBC AUTO x10*3/uL 15.4* 23.4* 14.8* 15.5*   HEMOGLOBIN g/dL 8.5* 8.3* 7.6* 6.6*   HEMATOCRIT % 26.3* 23.6* 22.0* 18.3*   PLATELETS AUTO x10*3/uL 44* 53* 54* 74*   NEUTROS PCT AUTO % 92.0  --  82.2 87.5   LYMPHS PCT AUTO % 4.4  --  7.2 4.9   MONOS PCT AUTO % 1.8  --  7.7 5.5   EOS PCT AUTO % 0.0  --  0.6 0.3     Results from last 72 hours   Lab Units 07/26/24  0427 07/25/24  1710 07/25/24  0336 07/24/24  0339   SODIUM mmol/L 133* 131* 131* 129*  129*   POTASSIUM mmol/L 3.9 4.1 3.6 4.2  4.2   CHLORIDE mmol/L 98 96* 98 95*  95*   CO2 mmol/L 25 21 25 23  23   BUN mg/dL 19 23 28* 39*  39*   CREATININE mg/dL 2.52* 3.32* 3.81* 5.50*  5.50*   GLUCOSE mg/dL 266*  --  157* 59*   CALCIUM mg/dL 8.3*  --  7.9* 8.2*   ANION GAP mmol/L 14 18 12 15  15   EGFR mL/min/1.73m*2 32* 23* 20* 13*  13*   PHOSPHORUS mg/dL 2.7  3.2 3.1 3.2     Results from last 72 hours   Lab Units 07/26/24  0427 07/25/24  0336 07/24/24  0339   ALK PHOS U/L 137* 113 101   BILIRUBIN TOTAL mg/dL 0.8 1.1 0.9   BILIRUBIN DIRECT mg/dL  --   --  0.2   PROTEIN TOTAL g/dL 6.2* 5.5* 5.4*   ALT U/L 6* 3* 4*   AST U/L 11 8* 16   ALBUMIN g/dL 2.6* 2.2* 2.0*     Estimated Creatinine Clearance: 39 mL/min (A) (by C-G formula based on SCr of 2.52 mg/dL (H)).  CRP   Date Value Ref Range Status   07/24/2023 8.04 (A) mg/dL Final     Comment:     REF VALUE  < 1.00     07/24/2023 CANCELED       Comment:     Result canceled by the ancillary.   07/21/2023 11.83 (A) mg/dL Final     Comment:     REF VALUE  < 1.00       Microbiology  Susceptibility data from last 14 days.  Collected Specimen Info Organism Clindamycin Erythromycin Oxacillin Rifampin Tetracycline Trimethoprim/Sulfamethoxazole Vancomycin   07/22/24 Blood culture from Peripheral Venipuncture Methicillin Resistant Staphylococcus aureus (MRSA)  R  R  R  I  R  S  S   07/22/24 Blood culture from Peripheral Venipuncture Staphylococcus aureus                    Assessment/Plan   39 y.o. male with PMH of ESRD on HD (MWF, right femoral line), HFrEF (EF 35-40% 5/2024), T2DM, and multiple hospitalizations for L AKA infection and MRSA bacteremia who presented from SNF with hypotension. Now with blood cultures growing Methicillin-resistant Staphylococcus aureus.  Given his right femoral dialysis line, suspicion for source is catheter-associated infection. TTE 7/23 with thickened TV and mobile densities associated with the TV with severe TR, not seen previously (TTE 5/14/24 with trivial TR and normal TV), suspicious for endocarditis.  Currently receiving Vancomycin and Ceftaroline for the Methicillin-resistant Staphylococcus aureus.     Blood cx 7/22 with MRSA. POLI 7/24 with EF 40 and global hypokinesis, large vegetation encasing TDC with involvement and destruction of septal leaflet of TV and severe TR. Small mobile echodensity on  aortic valve, concerning for vegetation vs degenerative change. Moderate patent foramen ovale.      RECOMMENDATIONS:     Continue Vancomycin and Ceftaroline  Awaiting MR wrist.  (History of left wrist septic arthritis with MRSA s/p I&D in 2021)  Awaiting further imaging to assess other access sources for HD as there is plan for replacement of right femoral line per vascular surgery/IR.  CT surgery has reassessed since his newest POLI.  No plans as of yet.     I spent 35 minutes in the professional and overall care of this patient.      Mitra Maynard MD.  (please reach through EPIC Chat)  Infectious Diseases, Senior Attending Physician

## 2024-07-26 NOTE — PROGRESS NOTES
Met with pt and his mother Shanthi along with Kyleigh Campa CNP to discuss GOC.  PT is alert and oriented x3.  He has been living at [facility] for the past 2 years.  He has been on dialysis for past 10 years.   He enjoys cooking, music, singing. He loves music, he is a  and cha. He is very eager to get well and return to his normal routine. He is interested in completing advanced directives, designating his mom as his surrogate decision maker.  PRISCILA to meet with them to complete paperwork. SW to follow.   Addendum :  SW met with him and mom today and completed DPOA for healthcare paperwork.  It will be scanned into his medical record.  Mom was designated surrogate decision maker.     CHARISMA ESQUIVEL

## 2024-07-26 NOTE — PROGRESS NOTES
"Medical Intensive Care - Daily Progress Note   Subjective    Edwar Hemphill is a 39 y.o. year old male patient admitted on 7/22/2024 with following ICU needs: septic shock required pressors, infective endocarditis     Interval History:  Patient is laying on his bed, no chest pain no shortness of breath no N/V or any new complaints overnight. Still on pressors and on CVVH. No acute change.     Meds    Scheduled medications  atorvastatin, 40 mg, oral, Nightly  B complex-vitamin C-folic acid, 1 capsule, oral, Daily  ceftaroline, 400 mg, intravenous, q8h  cholecalciferol, 50,000 Units, oral, Once per day on Monday Thursday  epoetin tyson or biosimilar, 10,000 Units, subcutaneous, Once per day on Monday Wednesday Friday  folic acid, 1 mg, oral, Daily  hydrocortisone sodium succinate, 50 mg, intravenous, q6h  insulin glargine, 4 Units, subcutaneous, Nightly  insulin lispro, 0-10 Units, subcutaneous, TID  levothyroxine, 125 mcg, oral, Daily before breakfast  melatonin, 6 mg, oral, Nightly  midodrine, 10 mg, oral, q8h  nystatin, 1 Application, Topical, BID  pantoprazole, 40 mg, oral, Daily before breakfast  thiamine, 200 mg, oral, Daily  [Held by provider] vancomycin, 500 mg, intravenous, q12h      Continuous medications  norepinephrine, 0-0.2 mcg/kg/min, Last Rate: 0.04 mcg/kg/min (07/26/24 0800)  PrismaSol 4/2.5, 25 mL/kg/hr, Last Rate: 25 mL/kg/hr (07/26/24 0111)      PRN medications  PRN medications: acetaminophen **OR** acetaminophen **OR** acetaminophen, benzocaine, dextrose, dextrose, diphenhydrAMINE, fentaNYL, glucagon, glucagon, lidocaine, midazolam, vancomycin     Objective    Blood pressure 97/54, pulse 97, temperature 36 °C (96.8 °F), temperature source Temporal, resp. rate 20, height 1.753 m (5' 9.02\"), weight 75.4 kg (166 lb 3.6 oz), SpO2 100%.     Physical Exam   Constitutional:       Appearance: Normal appearance.   HENT:      Mouth/Throat:      Mouth: Mucous membranes are moist.   Cardiovascular:      Rate and " Rhythm: Tachycardia present. Diastolic murmur on LSB and pulmonic area      Heart sounds: Normal heart sounds.   Pulmonary:      Effort: Pulmonary effort is normal.      Breath sounds: Normal breath sounds.   Abdominal:      Palpations: Abdomen is soft.   Musculoskeletal:      Comments: Left foot amputated above the knee  Right hand amputated above elbow  Posterior calcaneous wound has yellow discharge   Skin:     General: Skin is dry.   Neurological:      General: No focal deficit present.      Mental Status: He is oriented to person, place, and time.   Psychiatric:         Behavior: Behavior normal.         Thought Content: Thought content normal.         Judgment: Judgment normal.        Intake/Output Summary (Last 24 hours) at 7/26/2024 1155  Last data filed at 7/26/2024 1100  Gross per 24 hour   Intake 580.61 ml   Output 479 ml   Net 101.61 ml     Labs:   Results from last 72 hours   Lab Units 07/26/24  0427 07/25/24  1710 07/25/24  0336 07/24/24  0339   SODIUM mmol/L 133* 131* 131* 129*  129*   POTASSIUM mmol/L 3.9 4.1 3.6 4.2  4.2   CHLORIDE mmol/L 98 96* 98 95*  95*   CO2 mmol/L 25 21 25 23  23   BUN mg/dL 19 23 28* 39*  39*   CREATININE mg/dL 2.52* 3.32* 3.81* 5.50*  5.50*   GLUCOSE mg/dL 266*  --  157* 59*   CALCIUM mg/dL 8.3*  --  7.9* 8.2*   ANION GAP mmol/L 14 18 12 15  15   EGFR mL/min/1.73m*2 32* 23* 20* 13*  13*   PHOSPHORUS mg/dL 2.7 3.2 3.1 3.2      Results from last 72 hours   Lab Units 07/26/24  0427 07/25/24  2112 07/25/24  0336 07/24/24  1522   WBC AUTO x10*3/uL 15.4* 23.4* 14.8* 15.5*   HEMOGLOBIN g/dL 8.5* 8.3* 7.6* 6.6*   HEMATOCRIT % 26.3* 23.6* 22.0* 18.3*   PLATELETS AUTO x10*3/uL 44* 53* 54* 74*   NEUTROS PCT AUTO % 92.0  --  82.2 87.5   LYMPHS PCT AUTO % 4.4  --  7.2 4.9   MONOS PCT AUTO % 1.8  --  7.7 5.5   EOS PCT AUTO % 0.0  --  0.6 0.3        Micro/ID:     Lab Results   Component Value Date    BLOODCULT Loaded on Instrument - Culture in progress 07/25/2024       Summary of  key imaging results from the last 24 hours  CT chest abdomen pelvis w IV contrast 7/25/24:  CHEST:  1. Interval development of bilateral pulmonary nodular opacities  several of them showing cavitation. Findings are concerning for  septic embolism/cavitary pneumonia.  2. Bilateral pleural effusions.  3. Right femoral central venous catheter with tip terminating in the  right ventricle. Hypodense thrombus around the catheter in the right  atrium correlating with the vegetation seen on prior echocardiogram.  4. Severely attenuated superior vena cava, bilateral brachiocephalic,  subclavian and internal jugular veins with the calcifications in the  brachiocephalic veins with extensive collaterals in the chest wall  likely representing chronic thrombosis.      ABDOMEN-PELVIS:  1. Severely attenuated inferior vena cava with multiple  calcifications, consistent with chronic IVC thrombosis.  2. Interval development of a T10 vertebral body fracture.  Re-demonstration of severe osseous changes throughout the  thoracolumbar spine. Findings are likely secondary to renal  osteodystrophy.  3. Diffuse thickening of the colon and rectum with the pericolonic  stranding consistent with the proctocolitis which can be  inflammatory/infectious in etiology. Correlate with clinical symptoms.  4. Diffuse mesenteric haziness and anasarca likely representing fluid  overload.  5. Bilateral atrophied kidneys with hypoenhancement, representative  of medical renal disease.      Assessment and Plan     Assessment: Edwar Hemphill is a 39 y.o. male with PMHx significant for ESRD (MWF  via right femoral line), NICM,  HFrEF (LVEF 35-40% on 5/2024 POLI), anemia, DM2, Left AKA, RUE amputation, HTN, PVD, GERD.  Admitted  with septic shock required pressors and infective endocarditis with large TV vegetation, w/ bcx growing GPCs with staph aureus ( with history MRSA bacteremia before). Started on vanc/zosyn, fluid resuscitated, ID, cardiology and vascular  medicine consulted. On TTE showed TV vegetation and possible source could be femoral catheter with attached vegetations, continued on CVVH based on nephrology consult. Changed zosyn to ceftaroline on 7/23. We were not able to take off the line as a source of infection given his DVTs in subclavian, LIJ, b/l innominate occlusion and complete occlusion of intrahepatic IVC its hard to find another access at this time. IR, cardiology, ID, Vascular medicine are on board. he also had  Acinetobacter baumanni positive on his wound culture. POLI has done 7/24/24 showed large vegetation on the catheter measures at least 1.9cm x 2.5 cm with  involvement of the TV with likely destruction of the septal leaflet of the TV and resultant severe TR.  He is on CVVH. Cardiac surgery consulted advised to obtain CTA, which has done yesterday and they also advised on consulting endovascular (Dr. Hunter) for possible transcatheter options as well.      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   -positive staph aureus bacteremia   -positive second blood culture with Gram positive cocci, clusters, on 7/25  - Ceftaroline started on 7/23 and Vancomycin started on 7/22   -palliative care consulted and patient said he wants to receive treatments        Plan:  -continue on levo   -FU with endovascular sx   -c/w ceftaroline and vancomycin   -repeat BC in 48 h       #HFrecEF   - last EF 39% (lowest EF 26%)   - Presumed ICM          # hx of paroxysmal Afib  #Multiple line-related DVTs   - CHADSVASC 6 (DM, PAD, TIA, HTN, HF)   - b/l subclavian DVT, L  IJ DVT, b/l innominate occlusion, complete occlusion of intrahepatic IVC s/p angioplasty   - priorly on Eliquis however recent GI bleed 6/15 at [facility] , eliquis was held and patient was planned for heparin trial but left ama prior to trial   - not a candidate for IVC filter given femoral TDC   - last DVT US w/ resolution of RLE DVT   -discontinued heparin due to low plts   -PF4 with normal activity     Plan:  -hold on anticoagulation for now         PULMONARY:  NAVI  CXR at      RENAL/GENITOURINARY:  #ESRD MWF thru right femoral line   -  iHD TTS since 2016, anuric   - RUE AVG c/b infection s/p RUE amputation above elbow 2021   - multiple TDCs (including iliolumbar)   - femoral TDC recently placed at  (14.5 [place] and 42 cm long)  - lokelma 5 MWF Held iso of hypokalemia  - home sevelmer held  Plan   -continue on CVVH now and continue FU with nephrology           GASTROENTEROLOGY:  #Esophagitis   #GERD  ::EGD on 6/8 clipped two foci of active esophageal bleeding   Plan:  -cw home PPI BID        ENDOCRINOLOGY:  #hypothyroidism  -last TSH 1.9  Plan:  -cw home levo 125     #T1DM  -home regiment : 8u glargine, SSI  -Hba1c 7.3  Plan:   -home regiment insulin 8u glargine  -ordered 4 unites glargine and SSI #2     HEMATOLOGY:  #Chronic anemia  #Anemia of chronic disease  -s/p transfusion of 1 Unit RBCs  -Hb 8.5 today   Plan:  - c/w home epo m/w/f  -trend cbc   -if Hb<7 transfuse      #Thrombocytopenia  -no concern for KALLIE now, possibly due to active infection   -PF4 activity normal  -Fibrinogen 201, INR 1.4, aPTT 34, PT 15  -s/p 2 units plt transfusion  -plt down trending to 44 today   Plan:  -FU on CBC         MUSCULOSKELETAL/ SKIN:  NAVI     INFECTIOUS DISEASE:  #Sepsis with line as Likely  source  #endocarditis   - repeat bcx in48h  -c/w vanc/ceftaroline  - has previously needed double coverage for 4 weeks with van/dapto to cover infection. But developed lung toxicity 2/2 dapto. And switched to linezolid.  Plan:  -  discuss with cardiac sx and endovascular sx in terms of sx plan   -after find a second line can consider removing femoral line as a source of inf  -FU with ID             ICU Check List      FEN:  Fluids: restricted   Electrolytes: k>4 mg>2 caution as ESRD  Nutrition: renal diet  DVT ppx: none  GI ppx: PPI  Bowel care: Miralax prn  Access:  PIV, Femoral line        Social:  Code: Full Code    NOK:   Extended Emergency Contact Information  Primary Emergency Contact: Shanthi Hemphill  Mobile Phone: [phone]  Relation: Parent  Secondary Emergency Contact: Terra Scott  Mobile Phone: [phone]    Tucker Irwin MD   07/26/24 at 11:55 AM     Disclaimer: Documentation completed with the information available at the time of input. The times in the chart may not be reflective of actual patient care times, interventions, or procedures. Documentation occurs after the physical care of the patient.

## 2024-07-26 NOTE — PROGRESS NOTES
Music Therapy Note    Edwar Hemphill was referred by     Therapy Session  Referral Type: New referral this admission  Visit Type: New visit  Session Start Time: 1305  Session End Time: 1320  Intervention Delivery: In-person  Conflict of Service: None  Number of family members present: 1  Family Present for Session: Parent/Guardian  Family Participation: Disengaged     Pre-assessment  Unable to Assess Reason: Outcomes not assessed  Mood/Affect: Tired/lethargic, Appropriate, Calm  Verbalized Emotional State: Relaxed         Treatment/Interventions  Music Therapy Interventions: Assessment  Interruption: No  Patient Fell Asleep at End of Session: No    Post-assessment  Unable to Assess Reason: Did not provide expressive therapy intervention  Total Session Time (min): 15 minutes    Narrative  Assessment Detail: Patient found lying in bed; family in the room. Receptive to education and benefits of music therapy services. 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 Has a keyboard and computer set up at home for recording. Enjoys playing with sounds and rhythms on the keyboard. Stated he is okay right now with no immediate needs. MT left a keyboard for him to play over the weekend. He expressed gratitude and looking forward to music therapy session on Monday.  Follow-up: MT will follow up with patient accordingly    Education Documentation  No documentation found.

## 2024-07-26 NOTE — CARE PLAN
The patient's goals for the shift include      The clinical goals for the shift include pt will remain free of injury during shift    Problem: Safety - Adult  Goal: Free from fall injury  Outcome: Progressing     Problem: Skin  Goal: Decreased wound size/increased tissue granulation at next dressing change  Flowsheets (Taken 7/25/2024 2023 by Evan Thrasher RN)  Decreased wound size/increased tissue granulation at next dressing change:   Protective dressings over bony prominences   Promote sleep for wound healing  Goal: Participates in plan/prevention/treatment measures  Outcome: Progressing  Goal: Prevent/manage excess moisture  Outcome: Progressing  Goal: Prevent/minimize sheer/friction injuries  Outcome: Progressing  Goal: Promote/optimize nutrition  Outcome: Progressing  Goal: Promote skin healing  Flowsheets (Taken 7/26/2024 1759)  Promote skin healing:   Assess skin/pad under line(s)/device(s)   Protective dressings over bony prominences   Turn/reposition every 2 hours/use positioning/transfer devices  Note: Skin care done per orders to areas of excoriation     Pt remains alert. MRI of his wrist was done today. BP remains labile, norepinephrine is currently at  .1 mcg/kg/min. CVVH running without issues.

## 2024-07-27 ENCOUNTER — APPOINTMENT (OUTPATIENT)
Dept: RADIOLOGY | Facility: HOSPITAL | Age: 39
End: 2024-07-27
Payer: COMMERCIAL

## 2024-07-27 LAB
ABO GROUP (TYPE) IN BLOOD: NORMAL
ALBUMIN SERPL BCP-MCNC: 2.5 G/DL (ref 3.4–5)
ALP SERPL-CCNC: 198 U/L (ref 33–120)
ALT SERPL W P-5'-P-CCNC: 22 U/L (ref 10–52)
ANION GAP SERPL CALC-SCNC: 13 MMOL/L (ref 10–20)
ANION GAP SERPL CALC-SCNC: 13 MMOL/L (ref 10–20)
ANTIBODY SCREEN: NORMAL
AST SERPL W P-5'-P-CCNC: 37 U/L (ref 9–39)
BASOPHILS # BLD AUTO: 0.01 X10*3/UL (ref 0–0.1)
BASOPHILS NFR BLD AUTO: 0.1 %
BASOPHILS NFR FLD MANUAL: 0 %
BILIRUB SERPL-MCNC: 0.6 MG/DL (ref 0–1.2)
BLASTS NFR FLD MANUAL: 0 %
BUN SERPL-MCNC: 12 MG/DL (ref 6–23)
BUN SERPL-MCNC: 13 MG/DL (ref 6–23)
CA-I BLD-SCNC: 1.2 MMOL/L (ref 1.1–1.33)
CALCIUM SERPL-MCNC: 8.2 MG/DL (ref 8.6–10.6)
CHLORIDE SERPL-SCNC: 98 MMOL/L (ref 98–107)
CHLORIDE SERPL-SCNC: 99 MMOL/L (ref 98–107)
CLARITY FLD: ABNORMAL
CO2 SERPL-SCNC: 25 MMOL/L (ref 21–32)
CO2 SERPL-SCNC: 25 MMOL/L (ref 21–32)
COLOR FLD: ABNORMAL
CREAT SERPL-MCNC: 1.98 MG/DL (ref 0.5–1.3)
CREAT SERPL-MCNC: 2.07 MG/DL (ref 0.5–1.3)
CRP SERPL-MCNC: 6.44 MG/DL
EGFRCR SERPLBLD CKD-EPI 2021: 41 ML/MIN/1.73M*2
EGFRCR SERPLBLD CKD-EPI 2021: 43 ML/MIN/1.73M*2
EOSINOPHIL # BLD AUTO: 0 X10*3/UL (ref 0–0.7)
EOSINOPHIL NFR BLD AUTO: 0 %
EOSINOPHIL NFR FLD MANUAL: 0 %
ERYTHROCYTE [DISTWIDTH] IN BLOOD BY AUTOMATED COUNT: 19.6 % (ref 11.5–14.5)
ERYTHROCYTE [SEDIMENTATION RATE] IN BLOOD BY WESTERGREN METHOD: <1 MM/H (ref 0–15)
GLUCOSE BLD MANUAL STRIP-MCNC: 313 MG/DL (ref 74–99)
GLUCOSE BLD MANUAL STRIP-MCNC: 341 MG/DL (ref 74–99)
GLUCOSE BLD MANUAL STRIP-MCNC: 390 MG/DL (ref 74–99)
GLUCOSE SERPL-MCNC: 427 MG/DL (ref 74–99)
HCT VFR BLD AUTO: 21.8 % (ref 41–52)
HGB BLD-MCNC: 7.1 G/DL (ref 13.5–17.5)
IMM GRANULOCYTES # BLD AUTO: 0.2 X10*3/UL (ref 0–0.7)
IMM GRANULOCYTES NFR BLD AUTO: 1.6 % (ref 0–0.9)
IMMATURE GRANULOCYTES IN FLUID: 0 %
LYMPHOCYTES # BLD AUTO: 0.73 X10*3/UL (ref 1.2–4.8)
LYMPHOCYTES NFR BLD AUTO: 6 %
LYMPHOCYTES NFR FLD MANUAL: 5 %
MAGNESIUM SERPL-MCNC: 2.28 MG/DL (ref 1.6–2.4)
MCH RBC QN AUTO: 29.2 PG (ref 26–34)
MCHC RBC AUTO-ENTMCNC: 32.6 G/DL (ref 32–36)
MCV RBC AUTO: 90 FL (ref 80–100)
MONOCYTES # BLD AUTO: 0.36 X10*3/UL (ref 0.1–1)
MONOCYTES NFR BLD AUTO: 3 %
MONOS+MACROS NFR FLD MANUAL: 93 %
NEUTROPHILS # BLD AUTO: 10.89 X10*3/UL (ref 1.2–7.7)
NEUTROPHILS NFR BLD AUTO: 89.3 %
NEUTROPHILS NFR FLD MANUAL: 2 %
NRBC BLD-RTO: 0 /100 WBCS (ref 0–0)
OTHER CELLS NFR FLD MANUAL: 0 %
PHOSPHATE SERPL-MCNC: 2.2 MG/DL (ref 2.5–4.9)
PHOSPHATE SERPL-MCNC: 2.6 MG/DL (ref 2.5–4.9)
PLASMA CELLS NFR FLD MANUAL: 0 %
PLATELET # BLD AUTO: 51 X10*3/UL (ref 150–450)
POTASSIUM SERPL-SCNC: 4 MMOL/L (ref 3.5–5.3)
POTASSIUM SERPL-SCNC: 4 MMOL/L (ref 3.5–5.3)
PROT SERPL-MCNC: 5.7 G/DL (ref 6.4–8.2)
RBC # BLD AUTO: 2.43 X10*6/UL (ref 4.5–5.9)
RBC # FLD AUTO: 5000 /UL
RH FACTOR (ANTIGEN D): NORMAL
SODIUM SERPL-SCNC: 132 MMOL/L (ref 136–145)
SODIUM SERPL-SCNC: 133 MMOL/L (ref 136–145)
TOTAL CELLS COUNTED FLD: 100
VANCOMYCIN SERPL-MCNC: 18.9 UG/ML (ref 5–20)
WBC # BLD AUTO: 12.2 X10*3/UL (ref 4.4–11.3)
WBC # FLD AUTO: 82 /UL

## 2024-07-27 PROCEDURE — 80053 COMPREHEN METABOLIC PANEL: CPT

## 2024-07-27 PROCEDURE — 90945 DIALYSIS ONE EVALUATION: CPT | Performed by: INTERNAL MEDICINE

## 2024-07-27 PROCEDURE — 2500000004 HC RX 250 GENERAL PHARMACY W/ HCPCS (ALT 636 FOR OP/ED): Performed by: INTERNAL MEDICINE

## 2024-07-27 PROCEDURE — 82330 ASSAY OF CALCIUM: CPT | Performed by: INTERNAL MEDICINE

## 2024-07-27 PROCEDURE — 84100 ASSAY OF PHOSPHORUS: CPT

## 2024-07-27 PROCEDURE — 2500000002 HC RX 250 W HCPCS SELF ADMINISTERED DRUGS (ALT 637 FOR MEDICARE OP, ALT 636 FOR OP/ED)

## 2024-07-27 PROCEDURE — 2020000001 HC ICU ROOM DAILY

## 2024-07-27 PROCEDURE — 85025 COMPLETE CBC W/AUTO DIFF WBC: CPT

## 2024-07-27 PROCEDURE — 88104 CYTOPATH FL NONGYN SMEARS: CPT | Performed by: PATHOLOGY

## 2024-07-27 PROCEDURE — 80202 ASSAY OF VANCOMYCIN: CPT

## 2024-07-27 PROCEDURE — 82565 ASSAY OF CREATININE: CPT | Performed by: INTERNAL MEDICINE

## 2024-07-27 PROCEDURE — 87077 CULTURE AEROBIC IDENTIFY: CPT

## 2024-07-27 PROCEDURE — 36415 COLL VENOUS BLD VENIPUNCTURE: CPT

## 2024-07-27 PROCEDURE — 86140 C-REACTIVE PROTEIN: CPT

## 2024-07-27 PROCEDURE — 83735 ASSAY OF MAGNESIUM: CPT

## 2024-07-27 PROCEDURE — 84100 ASSAY OF PHOSPHORUS: CPT | Performed by: INTERNAL MEDICINE

## 2024-07-27 PROCEDURE — 99232 SBSQ HOSP IP/OBS MODERATE 35: CPT | Performed by: INTERNAL MEDICINE

## 2024-07-27 PROCEDURE — 2500000001 HC RX 250 WO HCPCS SELF ADMINISTERED DRUGS (ALT 637 FOR MEDICARE OP)

## 2024-07-27 PROCEDURE — 89051 BODY FLUID CELL COUNT: CPT | Performed by: STUDENT IN AN ORGANIZED HEALTH CARE EDUCATION/TRAINING PROGRAM

## 2024-07-27 PROCEDURE — 90937 HEMODIALYSIS REPEATED EVAL: CPT

## 2024-07-27 PROCEDURE — 85652 RBC SED RATE AUTOMATED: CPT

## 2024-07-27 PROCEDURE — 99223 1ST HOSP IP/OBS HIGH 75: CPT | Performed by: STUDENT IN AN ORGANIZED HEALTH CARE EDUCATION/TRAINING PROGRAM

## 2024-07-27 PROCEDURE — 84520 ASSAY OF UREA NITROGEN: CPT | Performed by: INTERNAL MEDICINE

## 2024-07-27 PROCEDURE — 2500000001 HC RX 250 WO HCPCS SELF ADMINISTERED DRUGS (ALT 637 FOR MEDICARE OP): Performed by: INTERNAL MEDICINE

## 2024-07-27 PROCEDURE — 99291 CRITICAL CARE FIRST HOUR: CPT

## 2024-07-27 PROCEDURE — 73100 X-RAY EXAM OF WRIST: CPT | Mod: LT

## 2024-07-27 PROCEDURE — 86901 BLOOD TYPING SEROLOGIC RH(D): CPT

## 2024-07-27 PROCEDURE — 80051 ELECTROLYTE PANEL: CPT | Performed by: INTERNAL MEDICINE

## 2024-07-27 PROCEDURE — 2500000004 HC RX 250 GENERAL PHARMACY W/ HCPCS (ALT 636 FOR OP/ED)

## 2024-07-27 PROCEDURE — 82947 ASSAY GLUCOSE BLOOD QUANT: CPT

## 2024-07-27 PROCEDURE — 86850 RBC ANTIBODY SCREEN: CPT

## 2024-07-27 PROCEDURE — 86923 COMPATIBILITY TEST ELECTRIC: CPT

## 2024-07-27 PROCEDURE — 87070 CULTURE OTHR SPECIMN AEROBIC: CPT | Performed by: STUDENT IN AN ORGANIZED HEALTH CARE EDUCATION/TRAINING PROGRAM

## 2024-07-27 PROCEDURE — 82565 ASSAY OF CREATININE: CPT

## 2024-07-27 RX ORDER — VANCOMYCIN HYDROCHLORIDE 500 MG/100ML
500 INJECTION, SOLUTION INTRAVENOUS EVERY 12 HOURS
Status: DISCONTINUED | OUTPATIENT
Start: 2024-07-27 | End: 2024-07-29 | Stop reason: DRUGHIGH

## 2024-07-27 RX ORDER — HYDROMORPHONE HYDROCHLORIDE 1 MG/ML
0.2 INJECTION, SOLUTION INTRAMUSCULAR; INTRAVENOUS; SUBCUTANEOUS ONCE
Status: COMPLETED | OUTPATIENT
Start: 2024-07-27 | End: 2024-07-27

## 2024-07-27 RX ORDER — INSULIN GLARGINE 100 [IU]/ML
10 INJECTION, SOLUTION SUBCUTANEOUS NIGHTLY
Status: DISCONTINUED | OUTPATIENT
Start: 2024-07-27 | End: 2024-07-28

## 2024-07-27 ASSESSMENT — PAIN - FUNCTIONAL ASSESSMENT
PAIN_FUNCTIONAL_ASSESSMENT: 0-10
PAIN_FUNCTIONAL_ASSESSMENT: PAINAD (PAIN ASSESSMENT IN ADVANCED DEMENTIA SCALE)
PAIN_FUNCTIONAL_ASSESSMENT: 0-10

## 2024-07-27 ASSESSMENT — PAIN SCALES - GENERAL
PAINLEVEL_OUTOF10: 0 - NO PAIN

## 2024-07-27 NOTE — PROGRESS NOTES
Vancomycin Dosing by Pharmacy- FOLLOW UP    Edwar Hemphill is a 39 y.o. year old male who Pharmacy has been consulted for vancomycin dosing for other bacteremia . Based on the patient's indication and renal status this patient is being dosed based on a goal trough/random level of 15-20.     Renal function is currently CVVH dependent, currently running.     Current vancomycin dose: HELD, last given  at 0422 am.     Most recent random level: 18.9 mcg/mL    Visit Vitals  /68   Pulse 83   Temp 36 °C (96.8 °F) (Temporal)   Resp 15        Lab Results   Component Value Date    CREATININE 2.07 (H) 2024    CREATININE 2.52 (H) 2024    CREATININE 3.32 (H) 2024    CREATININE 3.81 (H) 2024        Patient weight is as follows:   Vitals:    24 0500   Weight: 75.4 kg (166 lb 3.6 oz)       Cultures:  Susceptibility data for the encounter in last 14 days.  Collected Specimen Info Organism Clindamycin Erythromycin Oxacillin Rifampin Tetracycline Trimethoprim/Sulfamethoxazole Vancomycin   24 Blood culture from Peripheral Venipuncture Methicillin Resistant Staphylococcus aureus (MRSA)  R  R  R  I  R  S  S   24 Blood culture from Peripheral Venipuncture Staphylococcus aureus               I/O last 3 completed shifts:  In: 1465.4 (20.7 mL/kg) [P.O.:970; I.V.:345.4 (4.9 mL/kg); IV Piggyback:150]  Out: 990 (14 mL/kg) [Other:990]  Dosing Weight: 70.9 kg   I/O during current shift:  I/O this shift:  In: -   Out: 148 [Other:148]    Temp (24hrs), Av.2 °C (97.1 °F), Min:36 °C (96.8 °F), Max:36.5 °C (97.7 °F)      Assessment/Plan    Within goal random/trough level. Will restart 500 mg q12h to start at 1000h. As per ID, continue with ceftaroline and vancomycin, prior history of MRSA septic arthritis in .   The next level will be obtained prior to 3rd dose (trough level) on  at 0900. May be obtained sooner if clinically indicated.   Will continue to monitor renal function daily while on  vancomycin and order serum creatinine at least every 48 hours if not already ordered.  Follow for continued vancomycin needs, clinical response, and signs/symptoms of toxicity.   Follow nephro's plan, HD patient needing CVVH for hemodynamic instability, pending evaluation by IR and CT to assess HD access.       Natividad Clay, PharmD

## 2024-07-27 NOTE — CARE PLAN
The patient's goals for the shift include  consume hot chocolate    The clinical goals for the shift include VS within MD ordered parameters  Over the shift, the patient did not make progress toward the following goals. Barriers to progression include critical illness. Recommendations to address these barriers include per MD order  Problem: Pain - Adult  Goal: Verbalizes/displays adequate comfort level or baseline comfort level  Outcome: Progressing     Problem: Safety - Adult  Goal: Free from fall injury  Outcome: Progressing     Problem: Skin  Goal: Prevent/manage excess moisture  Outcome: Progressing  Goal: Promote/optimize nutrition  Outcome: Progressing     Problem: Fall/Injury  Goal: Not fall by end of shift  Outcome: Progressing  Goal: Be free from injury by end of the shift  Outcome: Progressing  Goal: Verbalize understanding of personal risk factors for fall in the hospital  Outcome: Progressing   .

## 2024-07-27 NOTE — CARE PLAN
The patient's goals for the shift include    Problem: Pain - Adult  Goal: Verbalizes/displays adequate comfort level or baseline comfort level  Outcome: Progressing     Problem: Safety - Adult  Goal: Free from fall injury  Outcome: Progressing     Problem: Skin  Goal: Decreased wound size/increased tissue granulation at next dressing change  Outcome: Progressing  Flowsheets (Taken 7/27/2024 1636)  Decreased wound size/increased tissue granulation at next dressing change:   Promote sleep for wound healing   Protective dressings over bony prominences   Utilize specialty bed per algorithm  Goal: Participates in plan/prevention/treatment measures  Outcome: Progressing  Flowsheets (Taken 7/27/2024 1636)  Participates in plan/prevention/treatment measures:   Discuss with provider PT/OT consult   Elevate heels  Goal: Prevent/manage excess moisture  Outcome: Progressing  Flowsheets (Taken 7/27/2024 1636)  Prevent/manage excess moisture:   Cleanse incontinence/protect with barrier cream   Monitor for/manage infection if present   Follow provider orders for dressing changes   Use wicking fabric (obtain order)   Moisturize dry skin  Goal: Prevent/minimize sheer/friction injuries  Outcome: Progressing  Flowsheets (Taken 7/27/2024 1636)  Prevent/minimize sheer/friction injuries:   Complete micro-shifts as needed if patient unable. Adjust patient position to relieve pressure points, not a full turn   Increase activity/out of bed for meals   Use pull sheet   HOB 30 degrees or less   Utilize specialty bed per algorithm   Turn/reposition every 2 hours/use positioning/transfer devices  Goal: Promote skin healing  Outcome: Progressing  Flowsheets (Taken 7/27/2024 1636)  Promote skin healing:   Assess skin/pad under line(s)/device(s)   Protective dressings over bony prominences   Turn/reposition every 2 hours/use positioning/transfer devices   Ensure correct size (line/device) and apply per  instructions   Rotate device  position/do not position patient on device     Problem: Diabetes  Goal: Maintain glucose levels >70mg/dl to <250mg/dl throughout shift  Outcome: Progressing       The clinical goals for the shift include Pt will stay off Levophed today.    Pt has maintained his BP and no longer needs Levophed.

## 2024-07-27 NOTE — PROGRESS NOTES
CRRT procedure note   Seen and assessed on CVVH. Labs and events reviewed   Currently not on pressors   Vascular access: fem trialysis catheter  BFR :200 ml/min  Filter:M150  Total Replacement Rate: 1800 ml/hour  Pre blood pump : 600 ml/hour    Replacement solution potassium:  4 mEq/L  Patient fluid removal: per ICU team   Please check Phosphorus, Calcium and Magnesium daily and replace peripherally as needed  Continue with current CVVH prescription; if remains off pressors and more stable likely transtion to IHD over the next 24 hrs

## 2024-07-27 NOTE — PROGRESS NOTES
"Medical Intensive Care - Daily Progress Note   Subjective    Edwar Hemphill is a 39 y.o. year old male patient admitted on 7/22/2024 with following ICU needs: septic shock required pressors, infective endocarditis     Interval History:  Patient seen and examined at bedside. On CVVH this morning. HDS with pressors stopped at 0500. On room air with normal saturation. He denies any new or worsening symptoms. Pt's mother at bedside.      Meds    Scheduled medications  atorvastatin, 40 mg, oral, Nightly  B complex-vitamin C-folic acid, 1 capsule, oral, Daily  ceftaroline, 400 mg, intravenous, q8h  cholecalciferol, 50,000 Units, oral, Once per day on Monday Thursday  epoetin tyson or biosimilar, 10,000 Units, subcutaneous, Once per day on Monday Wednesday Friday  folic acid, 1 mg, oral, Daily  insulin glargine, 10 Units, subcutaneous, Nightly  insulin lispro, 0-10 Units, subcutaneous, TID  levothyroxine, 125 mcg, oral, Daily before breakfast  melatonin, 6 mg, oral, Nightly  midodrine, 10 mg, oral, q8h  nystatin, 1 Application, Topical, BID  pantoprazole, 40 mg, oral, Daily before breakfast  thiamine, 200 mg, oral, Daily  vancomycin, 500 mg, intravenous, q12h      Continuous medications  norepinephrine, 0-0.2 mcg/kg/min, Last Rate: Stopped (07/27/24 0500)  PrismaSol 4/2.5, 25 mL/kg/hr, Last Rate: 25 mL/kg/hr (07/27/24 1341)      PRN medications  PRN medications: acetaminophen **OR** acetaminophen **OR** acetaminophen, benzocaine, dextrose, dextrose, diphenhydrAMINE, fentaNYL, glucagon, glucagon, heparin, heparin, lidocaine, midazolam, vancomycin     Objective    Blood pressure 95/71, pulse 80, temperature 35.4 °C (95.7 °F), temperature source Temporal, resp. rate 14, height 1.753 m (5' 9.02\"), weight 75.4 kg (166 lb 3.6 oz), SpO2 (!) 68%.     Physical Exam   Constitutional:       Appearance: Normal appearance.   HENT:      Mouth/Throat:      Mouth: Mucous membranes are moist.   Cardiovascular:      Rate and Rhythm: " Tachycardia present. Diastolic murmur on LSB and pulmonic area      Heart sounds: Normal heart sounds.   Pulmonary:      Effort: Pulmonary effort is normal.      Breath sounds: Normal breath sounds.   Abdominal:      Palpations: Abdomen is soft.   Musculoskeletal:      Comments: Left foot amputated above the knee  Right hand amputated above elbow  Posterior calcaneous wound has yellow discharge   Skin:     General: Skin is dry.   Neurological:      General: No focal deficit present.      Mental Status: He is oriented to person, place, and time.   Psychiatric:         Behavior: Behavior normal.         Thought Content: Thought content normal.         Judgment: Judgment normal.        Intake/Output Summary (Last 24 hours) at 7/27/2024 1442  Last data filed at 7/27/2024 1300  Gross per 24 hour   Intake 998.32 ml   Output 983 ml   Net 15.32 ml     Labs:   Results from last 72 hours   Lab Units 07/27/24  0542 07/26/24  0427 07/25/24  1710 07/25/24  0336   SODIUM mmol/L 133* 133* 131* 131*   POTASSIUM mmol/L 4.0 3.9 4.1 3.6   CHLORIDE mmol/L 99 98 96* 98   CO2 mmol/L 25 25 21 25   BUN mg/dL 13 19 23 28*   CREATININE mg/dL 2.07* 2.52* 3.32* 3.81*   GLUCOSE mg/dL 427* 266*  --  157*   CALCIUM mg/dL 8.2* 8.3*  --  7.9*   ANION GAP mmol/L 13 14 18 12   EGFR mL/min/1.73m*2 41* 32* 23* 20*   PHOSPHORUS mg/dL 2.6 2.7 3.2 3.1      Results from last 72 hours   Lab Units 07/27/24  0542 07/26/24  0427 07/25/24  2112 07/25/24  0336   WBC AUTO x10*3/uL 12.2* 15.4* 23.4* 14.8*   HEMOGLOBIN g/dL 7.1* 8.5* 8.3* 7.6*   HEMATOCRIT % 21.8* 26.3* 23.6* 22.0*   PLATELETS AUTO x10*3/uL 51* 44* 53* 54*   NEUTROS PCT AUTO % 89.3 92.0  --  82.2   LYMPHS PCT AUTO % 6.0 4.4  --  7.2   MONOS PCT AUTO % 3.0 1.8  --  7.7   EOS PCT AUTO % 0.0 0.0  --  0.6        Micro/ID:     Lab Results   Component Value Date    BLOODCULT Loaded on Instrument - Culture in progress 07/27/2024       Summary of key imaging results from the last 24 hours  CT chest abdomen  pelvis w IV contrast 7/25/24:  CHEST:  1. Interval development of bilateral pulmonary nodular opacities  several of them showing cavitation. Findings are concerning for  septic embolism/cavitary pneumonia.  2. Bilateral pleural effusions.  3. Right femoral central venous catheter with tip terminating in the  right ventricle. Hypodense thrombus around the catheter in the right  atrium correlating with the vegetation seen on prior echocardiogram.  4. Severely attenuated superior vena cava, bilateral brachiocephalic,  subclavian and internal jugular veins with the calcifications in the  brachiocephalic veins with extensive collaterals in the chest wall  likely representing chronic thrombosis.      ABDOMEN-PELVIS:  1. Severely attenuated inferior vena cava with multiple  calcifications, consistent with chronic IVC thrombosis.  2. Interval development of a T10 vertebral body fracture.  Re-demonstration of severe osseous changes throughout the  thoracolumbar spine. Findings are likely secondary to renal  osteodystrophy.  3. Diffuse thickening of the colon and rectum with the pericolonic  stranding consistent with the proctocolitis which can be  inflammatory/infectious in etiology. Correlate with clinical symptoms.  4. Diffuse mesenteric haziness and anasarca likely representing fluid  overload.  5. Bilateral atrophied kidneys with hypoenhancement, representative  of medical renal disease.      Assessment and Plan     Assessment: Edwar Hemphill is a 39 y.o. male with PMHx significant for ESRD (MWF  via right femoral line), NICM,  HFrEF (LVEF 35-40% on 5/2024 POLI), anemia, DM2, Left AKA, RUE amputation, HTN, PVD, GERD.  Admitted  with septic shock required pressors and infective endocarditis with large TV vegetation, w/ bcx growing GPCs with staph aureus ( with history MRSA bacteremia before). Started on vanc/zosyn, fluid resuscitated, ID, cardiology and vascular medicine consulted. On TTE showed TV vegetation and possible  source could be femoral catheter with attached vegetations, continued on CVVH based on nephrology consult. Changed zosyn to ceftaroline on 7/23. We were not able to take off the line as a source of infection given his DVTs in subclavian, LIJ, b/l innominate occlusion and complete occlusion of intrahepatic IVC its hard to find another access at this time. IR, cardiology, ID, Vascular medicine are on board. he also had  Acinetobacter baumanni positive on his wound culture. POLI has done 7/24/24 showed large vegetation on the catheter measures at least 1.9cm x 2.5 cm with  involvement of the TV with likely destruction of the septal leaflet of the TV and resultant severe TR.  He is on CVVH. Cardiac surgery consulted advised to obtain CTA, which has done yesterday and they also advised on consulting endovascular (Dr. Hunter) for possible transcatheter options as well.      Mechanical Ventilation: none  Sedation/Analgesia:  none  Restraints: no    Summary for 07/27/24  :  Continue with current antibiotics   Ortho consulted for left wrist arthrocentesis  Stress dose steroids discontinued.   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   -positive staph aureus bacteremia   -positive second blood culture with Gram positive cocci, clusters, on 7/25  - Ceftaroline started on 7/23 and Vancomycin started on 7/22   -palliative care consulted and patient said he wants to receive treatments        Plan:  -continue on levo   -FU with endovascular sx   -c/w ceftaroline and vancomycin   -repeat BC in 48 h       #HFrecEF   - last EF 39% (lowest EF 26%)   - Presumed ICM          # hx of paroxysmal Afib  #Multiple line-related DVTs   - CHADSVASC 6 (DM, PAD, TIA, HTN,  HF)   - b/l subclavian DVT, L IJ DVT, b/l innominate occlusion, complete occlusion of intrahepatic IVC s/p angioplasty   - priorly on Eliquis however recent GI bleed 6/15 at [facility] , eliquis was held and patient was planned for heparin trial but left ama prior to trial   - not a candidate for IVC filter given femoral TDC   - last DVT US w/ resolution of RLE DVT   -discontinued heparin due to low plts   -PF4 with normal activity     Plan:  -hold on anticoagulation for now         PULMONARY:  NAVI  CXR at      RENAL/GENITOURINARY:  #ESRD MWF thru right femoral line   -  iHD TTS since 2016, anuric   - RUE AVG c/b infection s/p RUE amputation above elbow 2021   - multiple TDCs (including iliolumbar)   - femoral TDC recently placed at  (14.5 [place]ian and 42 cm long)  - lokelma 5 MWF Held iso of hypokalemia  - home sevelmer held  Plan   -continue on CVVH now and continue FU with nephrology           GASTROENTEROLOGY:  #Esophagitis   #GERD  ::EGD on 6/8 clipped two foci of active esophageal bleeding   Plan:  -cw home PPI BID        ENDOCRINOLOGY:  #hypothyroidism  -last TSH 1.9  Plan:  -cw home levo 125     #T1DM  -home regiment : 8u glargine, SSI  -Hba1c 7.3  Plan:   -home regiment insulin 8u glargine  -ordered 4 unites glargine and SSI #2     HEMATOLOGY:  #Chronic anemia  #Anemia of chronic disease  -s/p transfusion of 1 Unit RBCs  -Hb 8.5 today   Plan:  - c/w home epo m/w/f  -trend cbc   -if Hb<7 transfuse      #Thrombocytopenia  -no concern for KALLIE now, possibly due to active infection   -PF4 activity normal  -Fibrinogen 201, INR 1.4, aPTT 34, PT 15  -s/p 2 units plt transfusion  -plt down trending to 44 today   Plan:  -FU on CBC         MUSCULOSKELETAL/ SKIN:  NAVI     INFECTIOUS DISEASE:  #Sepsis with line as Likely  source  #endocarditis   - repeat bcx in48h  -c/w vanc/ceftaroline  - has previously needed double coverage for 4 weeks with van/dapto to cover infection. But developed lung toxicity 2/2 dapto. And switched  to linezolid.  Plan:  - discuss with cardiac sx and endovascular sx in terms of sx plan   -after find a second line can consider removing femoral line as a source of inf  -FU with ID             ICU Check List      FEN:  Fluids: restricted   Electrolytes: k>4 mg>2 caution as ESRD  Nutrition: renal diet  DVT ppx: none  GI ppx: PPI  Bowel care: Miralax prn  Access:  PIV, Femoral line        Social:  Code: Full Code    NOK:   Extended Emergency Contact Information  Primary Emergency Contact: Shanthi Hemphill  Mobile Phone: [phone]  Relation: Parent  Secondary Emergency Contact: Terra Scott  Mobile Phone: [phone]    Joey Cortes MD   07/27/24 at 2:42 PM     Disclaimer: Documentation completed with the information available at the time of input. The times in the chart may not be reflective of actual patient care times, interventions, or procedures. Documentation occurs after the physical care of the patient.

## 2024-07-27 NOTE — PROGRESS NOTES
"Edwar Hemphill is a 39 y.o. male on day 5 of admission presenting with Septic shock (Multi).    Subjective   Interval History:   He states that he feels \"okay\" but \"cold cause of the dialysis\"  Left wrist is not painful today.  He allowed me to examine.  No other complaints.        Review of Systems    Objective   Range of Vitals (last 24 hours)  Heart Rate:  []   Temp:  [36 °C (96.8 °F)-36.5 °C (97.7 °F)]   Resp:  [8-20]   BP: ()/()   SpO2:  [94 %-100 %]   Daily Weight  07/25/24 : 75.4 kg (166 lb 3.6 oz)    Body mass index is 24.54 kg/m².    Physical Exam  CONSTITUTIONAL: Chronically ill appearing, NAD, cooperative  SKIN:  No acute rashes or lesions.  EYES: PERRLA, EOMI, Sclera anicteric.  No conjunctival injection.    CVS: RRR, S1 & S2 normal.    RESPIRATORY/CHEST: Clear in all lung fields.    GI: Soft, NT/ND.  No palpable hepatosplenomegaly.    EXT: No CCE.      Antibiotics  ceftaroline (Teflaro) IV in 50 mL D5W  vancomycin - 500 mg/100 mL    Relevant Results  Labs  Results from last 72 hours   Lab Units 07/27/24  0542 07/26/24 0427 07/25/24  2112 07/25/24  0336   WBC AUTO x10*3/uL 12.2* 15.4* 23.4* 14.8*   HEMOGLOBIN g/dL 7.1* 8.5* 8.3* 7.6*   HEMATOCRIT % 21.8* 26.3* 23.6* 22.0*   PLATELETS AUTO x10*3/uL 51* 44* 53* 54*   NEUTROS PCT AUTO % 89.3 92.0  --  82.2   LYMPHS PCT AUTO % 6.0 4.4  --  7.2   MONOS PCT AUTO % 3.0 1.8  --  7.7   EOS PCT AUTO % 0.0 0.0  --  0.6     Results from last 72 hours   Lab Units 07/27/24  0542 07/26/24  0427 07/25/24  1710 07/25/24  0336   SODIUM mmol/L 133* 133* 131* 131*   POTASSIUM mmol/L 4.0 3.9 4.1 3.6   CHLORIDE mmol/L 99 98 96* 98   CO2 mmol/L 25 25 21 25   BUN mg/dL 13 19 23 28*   CREATININE mg/dL 2.07* 2.52* 3.32* 3.81*   GLUCOSE mg/dL 427* 266*  --  157*   CALCIUM mg/dL 8.2* 8.3*  --  7.9*   ANION GAP mmol/L 13 14 18 12   EGFR mL/min/1.73m*2 41* 32* 23* 20*   PHOSPHORUS mg/dL 2.6 2.7 3.2 3.1     Results from last 72 hours   Lab Units 07/27/24  0542 " 07/26/24  0427 07/25/24  0336   ALK PHOS U/L 198* 137* 113   BILIRUBIN TOTAL mg/dL 0.6 0.8 1.1   PROTEIN TOTAL g/dL 5.7* 6.2* 5.5*   ALT U/L 22 6* 3*   AST U/L 37 11 8*   ALBUMIN g/dL 2.5* 2.6* 2.2*     Estimated Creatinine Clearance: 47.4 mL/min (A) (by C-G formula based on SCr of 2.07 mg/dL (H)).  CRP   Date Value Ref Range Status   07/24/2023 8.04 (A) mg/dL Final     Comment:     REF VALUE  < 1.00     07/24/2023 CANCELED       Comment:     Result canceled by the ancillary.   07/21/2023 11.83 (A) mg/dL Final     Comment:     REF VALUE  < 1.00       Microbiology  Susceptibility data from last 14 days.  Collected Specimen Info Organism Clindamycin Erythromycin Oxacillin Rifampin Tetracycline Trimethoprim/Sulfamethoxazole Vancomycin   07/22/24 Blood culture from Peripheral Venipuncture Methicillin Resistant Staphylococcus aureus (MRSA)  R  R  R  I  R  S  S   07/22/24 Blood culture from Peripheral Venipuncture Staphylococcus aureus            Imaging    MR Left wrist w/wo IV contrast  7/26/24  IMPRESSION:  1. No abnormal T1 marrow signal or enhancement to suggest  osteomyelitis. (No OM)  2. Moderate-sized enhancing radiocarpal and midcarpal joint effusion.  Reactive, inflammatory and septic arthritis is in the differential.  Given clinical history of prior septic arthritis, joint fluid  aspiration may be of value to exclude underlying infection.  3. Nonspecific edema and enhancement of the thenar muscles may  represent infectious myositis in the appropriate clinical setting.      I personally reviewed the images/study and I agree with the findings  as stated. This study was interpreted at [facility], [place], [place].    Signed by: Zach Ordonez 7/27/2024 8:05 AM  Dictation workstation:   [id number]        Assessment/Plan   39 y.o. male with PMH of ESRD on HD (MWF, right femoral line), HFrEF (EF 35-40% 5/2024), T2DM, and multiple hospitalizations for L AKA infection and MRSA  "bacteremia who presented from [facility] with hypotension. Now with blood cultures growing Methicillin-resistant Staphylococcus aureus.      His current, right femoral dialysis line was placed on 5/24/24 as a fresh stick after line holiday from 5/19.  Clearance of bacteremia  had mot been confirmed before replacement. Documented clearance of bacteremia at that time was 5/29/24.  Blood cx 7/22 with MRSA.     TTE 7/23 with thickened TV and mobile densities associated with the TV with severe TR, not seen previously (TTE 5/14/24 with trivial TR and normal TV), suspicious for endocarditis.  Currently receiving Vancomycin and Ceftaroline for the Methicillin-resistant Staphylococcus aureus.     POLI 7/24 with EF 40 and global hypokinesis, large vegetation encasing TDC with involvement and destruction of septal leaflet of TV and severe TR. Small mobile echodensity on aortic valve, concerning for vegetation vs degenerative change. Moderate patent foramen ovale.     He is in a precarious situation as he has little to no options for alternative sites due to extensive thrombus load despite anticoagulation.  It is quite possible that he has a \"septic thrombosis\" situation as well which contributes to reseeding of his blood stream, though this would be difficult to confirm as his bacteremias clear when lines are removed.    Awaiting plan from vascular/interventional cardiology/CTS.    Recommendations:    Continue Vancomycin and Ceftaroline  Consult orthopedic surgery (HAND) to address left wrist septic arthritis given MRI findings and that he is still bacteremia and this is his only hand     I spent 35 minutes in the professional and overall care of this patient.      Mitra Maynard MD  "

## 2024-07-27 NOTE — CONSULTS
Chief Complaint: L wrist pain    History of Present Illness: 39 y.o. LHD male with PMHx ESRD, cardiomyopathy, HFrEF, DM2, HTN, PVD, GERD, and prior RUE transhumeral amputation and L AKA who was admitted to MICU on 7/22 with septic shock (MRSA bacteremia) and infective endocarditis for whom orthopaedic hand surgery was consulted due to concern for left wrist septic arthritis. Patient endorses left wrist pain yesterday and states that it is greatly improved today. He denies fevers/chills and numbness/tingling in the left hand. He is currently on vancomycin and ceftaroline at the recommendation of Infectious Disease for his systemic/cardiac infections. He does have a history of left wrist septic arthritis s/p I&D at [facility] in 2021. He is currently on heparin.    Past Medical History:   Past Medical History:   Diagnosis Date    Chronic kidney disease     Diabetes mellitus (Multi)     ESRD (end stage renal disease) (Multi)     Essential (primary) hypertension 05/26/2017    HTN (hypertension), benign    GERD (gastroesophageal reflux disease)     Hyperlipidemia     Hypothyroidism         Past Surgical History:  Past Surgical History:   Procedure Laterality Date    ARM AMPUTATION AT ELBOW Right 05/2021    AV FISTULA PLACEMENT W/ PTFE      CT AORTA AND BILATERAL ILIOFEMORAL RUNOFF ANGIOGRAM W AND/OR WO IV CONTRAST  02/09/2023    CT AORTA AND BILATERAL ILIOFEMORAL RUNOFF ANGIOGRAM W AND/OR WO IV CONTRAST 2/9/2023 DOCTOR OFFICE [facility]    IR CVC EXCHANGE  11/13/2023    IR CVC EXCHANGE 11/13/2023 [facility]    IR CVC PICC  10/19/2023    IR CVC PICC    IR CVC PICC  2/28/2022    IR CVC PICC    LEG AMPUTATION THROUGH KNEE Left 07/08/2023    LEG AMPUTATION THROUGH LOWER TIBIA AND FIBULA Left 07/05/2023    TOE AMPUTATION Left 02/17/2023    left 4th    WOUND DEBRIDEMENT Left 07/26/2023    leg    WOUND DEBRIDEMENT Left 08/02/2023    multiple leg debridements        Social History: Tobacco use as noted in HPI. Social alcohol  use. Denies drug use.    Family History: Non-contributory to patient’s acute orthopaedic injury.    Review of Systems: Comprehensive review of systems negative except as noted in HPI and exam.    OBJECTIVE    Vitals:  Vitals:    07/27/24 1900   BP: 92/78   Pulse: 76   Resp: 12   Temp:    SpO2:         Physical Examination:  - Constitutional: no acute distress, alert, cooperative  - Eyes: anicteric  - Head/Neck: normocephalic, atraumatic  - Respiratory/Thorax: normal work of breathing  - Cardiovascular: extremities warm and well perfused (prior R transhumeral amputation and L AKA)  - Gastrointestinal: deferred  - Psychological: appropriate mood/behavior  - Skin: warm and dry; additional findings in musculoskeletal evaluation  - Musculoskeletal:    LEFT upper extremity:   -Appearance: skin intact, well healed longitudinal dorsal wrist incision, no erythema, minimal swelling  -Non-TTP about wrist  -No pain with active or passive wrist flexion/extension, no pain with elbow flexion/extension, no pain with digital ROM  -Motor intact in AIN/PIN/ulnar nerve distributions  -SILT in radial/median/ulnar nerve distributions  -Hand wwp, 2+ radial pulse  -Compartments soft and compressible, no pain with passive stretch of digits    Able to range all remaining joints of right upper and bilateral lower extremities without discomfort.    Imaging:  XR L wrist 7/27  1.  No acute osseous injury of the left wrist.  2. Mild osteoarthrosis of the 1st and 2nd CMC joints and triscaphe  joints.    MRI L wrist w/wo contrast 7/26  1. No abnormal T1 marrow signal or enhancement to suggest  osteomyelitis.  2. Moderate-sized enhancing radiocarpal and midcarpal joint effusion. Reactive, inflammatory and septic arthritis is in the differential. Given clinical history of prior septic arthritis, joint fluid aspiration may be of value to exclude underlying infection.  3. Nonspecific edema and enhancement of the thenar muscles may represent infectious  myositis in the appropriate clinical setting.    ASSESSMENT: 39 y.o. male with PMHx ESRD, cardiomyopathy, HFrEF, DM2, HTN, PVD, GERD, prior RUE transhumeral amputation and L AKA, prior L wrist septic arthritis s/p I&D (in 2021 at [facility]) who was admitted to MICU on 7/22 with septic shock (MRSA bacteremia) and infective endocarditis for whom orthopaedic surgery was consulted to r/o L wrist septic arthritis. Patient's pain is minimal and he has no discomfort with active and passive wrist ROM. He is afebrile with WBC 12.2, ESR <1, and CRP 6.44 today. Inflammatory markers are likely elevated in the setting of his systemic/cardiac infections.    Verbal permission was obtained for L wrist aspiration to rule out septic arthritis. Left wrist was cleansed with chloraprep and aspirated. <1 ml slightly cloudy synovial fluid was obtained and sent for cell count, gram stain, and culture. Preliminary aspirate results are cell count 82 WBCs (2% neutrophils) with gram stain/cultures pending. Low concern for left wrist septic arthritis at this time given clinical picture and low cell count.    PLAN:  - No surgical intervention indicated at this time, will continue to follow aspirate results and plan for L wrist I&D if cultures become positive  - Weight bearing status: WBAT LUE  - Antibiotics: per Infectious Disease  - Diet: ok for diet tonight, NPO at midnight pending gram stain/culture results  - DVT prophylaxis: per primary  - Castanon: per primary  - Dispo: Orthopaedic Hand will follow aspirate culture results with possible L wrist I&D to be performed if cultures return positive. If cultures remain negative, patient can follow up outpatient on an as-needed basis.    Patient was staffed with attending surgeon, Dr. Allen.    Albert Campbell MD  Orthopaedic Surgery, PGY-5  Available by Epic Chat    After 7 AM, this patient will be followed by the orthopaedic service listed below. Please Epic Chat or page respective residents with  questions/concerns.    Orthopaedic Hand Service  Norbert Yin MD (PGY-2)  Ankita Dyson MD (PGY-4)    From 6 PM-7 AM, weekends, holidays, and if no answer and there is an emergent issue, please page orthopaedic consult pager, [phone].

## 2024-07-28 VITALS
SYSTOLIC BLOOD PRESSURE: 71 MMHG | BODY MASS INDEX: 24.62 KG/M2 | RESPIRATION RATE: 15 BRPM | HEART RATE: 89 BPM | DIASTOLIC BLOOD PRESSURE: 50 MMHG | HEIGHT: 69 IN | OXYGEN SATURATION: 95 % | TEMPERATURE: 95.4 F | WEIGHT: 166.23 LBS

## 2024-07-28 LAB
ALBUMIN SERPL BCP-MCNC: 2.6 G/DL (ref 3.4–5)
ALBUMIN SERPL BCP-MCNC: 2.7 G/DL (ref 3.4–5)
ALP SERPL-CCNC: 203 U/L (ref 33–120)
ALT SERPL W P-5'-P-CCNC: 42 U/L (ref 10–52)
ANION GAP SERPL CALC-SCNC: 10 MMOL/L (ref 10–20)
ANION GAP SERPL CALC-SCNC: 12 MMOL/L (ref 10–20)
AST SERPL W P-5'-P-CCNC: 57 U/L (ref 9–39)
BACTERIA BLD AEROBE CULT: ABNORMAL
BACTERIA BLD CULT: ABNORMAL
BACTERIA FLD CULT: ABNORMAL
BASOPHILS # BLD MANUAL: 0 X10*3/UL (ref 0–0.1)
BASOPHILS NFR BLD MANUAL: 0 %
BILIRUB SERPL-MCNC: 0.6 MG/DL (ref 0–1.2)
BUN SERPL-MCNC: 10 MG/DL (ref 6–23)
BUN SERPL-MCNC: 11 MG/DL (ref 6–23)
CALCIUM SERPL-MCNC: 8 MG/DL (ref 8.6–10.6)
CALCIUM SERPL-MCNC: 8.1 MG/DL (ref 8.6–10.6)
CHLORIDE SERPL-SCNC: 99 MMOL/L (ref 98–107)
CHLORIDE SERPL-SCNC: 99 MMOL/L (ref 98–107)
CO2 SERPL-SCNC: 25 MMOL/L (ref 21–32)
CO2 SERPL-SCNC: 27 MMOL/L (ref 21–32)
CREAT SERPL-MCNC: 1.65 MG/DL (ref 0.5–1.3)
CREAT SERPL-MCNC: 1.81 MG/DL (ref 0.5–1.3)
EGFRCR SERPLBLD CKD-EPI 2021: 48 ML/MIN/1.73M*2
EGFRCR SERPLBLD CKD-EPI 2021: 54 ML/MIN/1.73M*2
EOSINOPHIL # BLD MANUAL: 0 X10*3/UL (ref 0–0.7)
EOSINOPHIL NFR BLD MANUAL: 0 %
ERYTHROCYTE [DISTWIDTH] IN BLOOD BY AUTOMATED COUNT: 20.3 % (ref 11.5–14.5)
GLUCOSE BLD MANUAL STRIP-MCNC: 105 MG/DL (ref 74–99)
GLUCOSE BLD MANUAL STRIP-MCNC: 139 MG/DL (ref 74–99)
GLUCOSE BLD MANUAL STRIP-MCNC: 148 MG/DL (ref 74–99)
GLUCOSE BLD MANUAL STRIP-MCNC: 57 MG/DL (ref 74–99)
GLUCOSE BLD MANUAL STRIP-MCNC: 98 MG/DL (ref 74–99)
GLUCOSE SERPL-MCNC: 169 MG/DL (ref 74–99)
GLUCOSE SERPL-MCNC: 237 MG/DL (ref 74–99)
GRAM STN SPEC: ABNORMAL
HCT VFR BLD AUTO: 24.2 % (ref 41–52)
HGB BLD-MCNC: 7.7 G/DL (ref 13.5–17.5)
HYPOCHROMIA BLD QL SMEAR: ABNORMAL
IMM GRANULOCYTES # BLD AUTO: 0.64 X10*3/UL (ref 0–0.7)
IMM GRANULOCYTES NFR BLD AUTO: 4.3 % (ref 0–0.9)
LYMPHOCYTES # BLD MANUAL: 0.5 X10*3/UL (ref 1.2–4.8)
LYMPHOCYTES NFR BLD MANUAL: 3.4 %
MAGNESIUM SERPL-MCNC: 2.25 MG/DL (ref 1.6–2.4)
MCH RBC QN AUTO: 29.5 PG (ref 26–34)
MCHC RBC AUTO-ENTMCNC: 31.8 G/DL (ref 32–36)
MCV RBC AUTO: 93 FL (ref 80–100)
MONOCYTES # BLD MANUAL: 0.12 X10*3/UL (ref 0.1–1)
MONOCYTES NFR BLD MANUAL: 0.8 %
MYELOCYTES # BLD MANUAL: 0.25 X10*3/UL
MYELOCYTES NFR BLD MANUAL: 1.7 %
NEUTROPHILS # BLD MANUAL: 13.92 X10*3/UL (ref 1.2–7.7)
NEUTS BAND # BLD MANUAL: 0.13 X10*3/UL (ref 0–0.7)
NEUTS BAND NFR BLD MANUAL: 0.9 %
NEUTS SEG # BLD MANUAL: 13.79 X10*3/UL (ref 1.2–7)
NEUTS SEG NFR BLD MANUAL: 93.2 %
NRBC BLD-RTO: 0 /100 WBCS (ref 0–0)
OVALOCYTES BLD QL SMEAR: ABNORMAL
PHOSPHATE SERPL-MCNC: 2.2 MG/DL (ref 2.5–4.9)
PHOSPHATE SERPL-MCNC: 2.4 MG/DL (ref 2.5–4.9)
PLATELET # BLD AUTO: 57 X10*3/UL (ref 150–450)
POLYCHROMASIA BLD QL SMEAR: ABNORMAL
POTASSIUM SERPL-SCNC: 3.8 MMOL/L (ref 3.5–5.3)
POTASSIUM SERPL-SCNC: 5.3 MMOL/L (ref 3.5–5.3)
PROT SERPL-MCNC: 6 G/DL (ref 6.4–8.2)
RBC # BLD AUTO: 2.61 X10*6/UL (ref 4.5–5.9)
RBC MORPH BLD: ABNORMAL
SODIUM SERPL-SCNC: 131 MMOL/L (ref 136–145)
SODIUM SERPL-SCNC: 132 MMOL/L (ref 136–145)
TOTAL CELLS COUNTED BLD: 117
VANCOMYCIN SERPL-MCNC: 19.3 UG/ML (ref 5–20)
WBC # BLD AUTO: 14.8 X10*3/UL (ref 4.4–11.3)

## 2024-07-28 PROCEDURE — 84100 ASSAY OF PHOSPHORUS: CPT

## 2024-07-28 PROCEDURE — 80202 ASSAY OF VANCOMYCIN: CPT | Performed by: INTERNAL MEDICINE

## 2024-07-28 PROCEDURE — 36415 COLL VENOUS BLD VENIPUNCTURE: CPT | Performed by: INTERNAL MEDICINE

## 2024-07-28 PROCEDURE — 80053 COMPREHEN METABOLIC PANEL: CPT

## 2024-07-28 PROCEDURE — 2500000001 HC RX 250 WO HCPCS SELF ADMINISTERED DRUGS (ALT 637 FOR MEDICARE OP)

## 2024-07-28 PROCEDURE — 82947 ASSAY GLUCOSE BLOOD QUANT: CPT

## 2024-07-28 PROCEDURE — 83735 ASSAY OF MAGNESIUM: CPT

## 2024-07-28 PROCEDURE — 2500000004 HC RX 250 GENERAL PHARMACY W/ HCPCS (ALT 636 FOR OP/ED): Performed by: INTERNAL MEDICINE

## 2024-07-28 PROCEDURE — 90945 DIALYSIS ONE EVALUATION: CPT | Performed by: INTERNAL MEDICINE

## 2024-07-28 PROCEDURE — 2500000004 HC RX 250 GENERAL PHARMACY W/ HCPCS (ALT 636 FOR OP/ED): Mod: JZ

## 2024-07-28 PROCEDURE — 99291 CRITICAL CARE FIRST HOUR: CPT

## 2024-07-28 PROCEDURE — 36415 COLL VENOUS BLD VENIPUNCTURE: CPT

## 2024-07-28 PROCEDURE — 85027 COMPLETE CBC AUTOMATED: CPT

## 2024-07-28 PROCEDURE — 2500000002 HC RX 250 W HCPCS SELF ADMINISTERED DRUGS (ALT 637 FOR MEDICARE OP, ALT 636 FOR OP/ED)

## 2024-07-28 PROCEDURE — 2020000001 HC ICU ROOM DAILY

## 2024-07-28 PROCEDURE — 2500000001 HC RX 250 WO HCPCS SELF ADMINISTERED DRUGS (ALT 637 FOR MEDICARE OP): Performed by: INTERNAL MEDICINE

## 2024-07-28 PROCEDURE — 85007 BL SMEAR W/DIFF WBC COUNT: CPT

## 2024-07-28 RX ORDER — INSULIN GLARGINE 100 [IU]/ML
5 INJECTION, SOLUTION SUBCUTANEOUS NIGHTLY
Status: DISCONTINUED | OUTPATIENT
Start: 2024-07-28 | End: 2024-07-30

## 2024-07-28 ASSESSMENT — PAIN SCALES - GENERAL
PAINLEVEL_OUTOF10: 0 - NO PAIN

## 2024-07-28 ASSESSMENT — PAIN - FUNCTIONAL ASSESSMENT
PAIN_FUNCTIONAL_ASSESSMENT: 0-10
PAIN_FUNCTIONAL_ASSESSMENT: 0-10

## 2024-07-28 NOTE — PROGRESS NOTES
RRT procedure note   Seen and assessed on CVVH. Labs and events reviewed   Currently not on pressors   Vascular access: fem trialysis catheter  BFR :200 ml/min  Filter:M150  Total Replacement Rate: 1800 ml/hour  Pre blood pump : 600 ml/hour    Replacement solution potassium:  4 mEq/L  Patient fluid removal: per ICU team   Please check Phosphorus, Calcium and Magnesium daily and replace peripherally as needed  Continue with current CVVH prescription; if remains off pressors and more stable likely transtion to IHD over the next 24 hrs

## 2024-07-28 NOTE — PROGRESS NOTES
"Medical Intensive Care - Daily Progress Note   Subjective    Edwar Hemphill is a 39 y.o. year old male patient admitted on 7/22/2024 with following ICU needs: septic shock required pressors, infective endocarditis     Interval History:  Patient seen and examined at bedside. On CVVH this morning. HDS and off pressors. On room air with normal saturation. He denies any new or worsening symptoms.         Meds    Scheduled medications  atorvastatin, 40 mg, oral, Nightly  B complex-vitamin C-folic acid, 1 capsule, oral, Daily  ceftaroline, 400 mg, intravenous, q8h  cholecalciferol, 50,000 Units, oral, Once per day on Monday Thursday  epoetin tyson or biosimilar, 10,000 Units, subcutaneous, Once per day on Monday Wednesday Friday  folic acid, 1 mg, oral, Daily  insulin glargine, 10 Units, subcutaneous, Nightly  insulin lispro, 0-10 Units, subcutaneous, TID  levothyroxine, 125 mcg, oral, Daily before breakfast  melatonin, 6 mg, oral, Nightly  midodrine, 10 mg, oral, q8h  nystatin, 1 Application, Topical, BID  pantoprazole, 40 mg, oral, Daily before breakfast  thiamine, 200 mg, oral, Daily  vancomycin, 500 mg, intravenous, q12h      Continuous medications  PrismaSol 4/2.5, 25 mL/kg/hr, Last Rate: 25 mL/kg/hr (07/27/24 1341)      PRN medications  PRN medications: acetaminophen **OR** acetaminophen **OR** acetaminophen, benzocaine, dextrose, dextrose, diphenhydrAMINE, fentaNYL, glucagon, glucagon, heparin, heparin, lidocaine, midazolam, vancomycin     Objective    Blood pressure 101/83, pulse 81, temperature 35.6 °C (96.1 °F), temperature source Temporal, resp. rate 16, height 1.753 m (5' 9.02\"), weight 75.4 kg (166 lb 3.6 oz), SpO2 100%.     Physical Exam   Constitutional:       Appearance: Normal appearance.   HENT:      Mouth/Throat:      Mouth: Mucous membranes are moist.   Cardiovascular:      Rate and Rhythm: Tachycardia present. Diastolic murmur on LSB and pulmonic area      Heart sounds: Normal heart sounds.   Pulmonary: "      Effort: Pulmonary effort is normal.      Breath sounds: Normal breath sounds.   Abdominal:      Palpations: Abdomen is soft.   Musculoskeletal:      Comments: Left foot amputated above the knee  Right hand amputated above elbow  Posterior calcaneous wound has yellow discharge   Skin:     General: Skin is dry.   Neurological:      General: No focal deficit present.      Mental Status: He is oriented to person, place, and time.   Psychiatric:         Behavior: Behavior normal.         Thought Content: Thought content normal.         Judgment: Judgment normal.        Intake/Output Summary (Last 24 hours) at 7/28/2024 1330  Last data filed at 7/28/2024 1300  Gross per 24 hour   Intake 580 ml   Output 1161 ml   Net -581 ml     Labs:   Results from last 72 hours   Lab Units 07/28/24  0517 07/27/24  2257 07/27/24  1752 07/27/24  0542   SODIUM mmol/L 132* 131* 132* 133*   POTASSIUM mmol/L 3.8 5.3 4.0 4.0   CHLORIDE mmol/L 99 99 98 99   CO2 mmol/L 27 25 25 25   BUN mg/dL 10 11 12 13   CREATININE mg/dL 1.65* 1.81* 1.98* 2.07*   GLUCOSE mg/dL 169* 237*  --  427*   CALCIUM mg/dL 8.0* 8.1*  --  8.2*   ANION GAP mmol/L 10 12 13 13   EGFR mL/min/1.73m*2 54* 48* 43* 41*   PHOSPHORUS mg/dL 2.2* 2.4* 2.2* 2.6      Results from last 72 hours   Lab Units 07/28/24  0517 07/27/24  0542 07/26/24  0427   WBC AUTO x10*3/uL 14.8* 12.2* 15.4*   HEMOGLOBIN g/dL 7.7* 7.1* 8.5*   HEMATOCRIT % 24.2* 21.8* 26.3*   PLATELETS AUTO x10*3/uL 57* 51* 44*   NEUTROS PCT AUTO %  --  89.3 92.0   LYMPHO PCT MAN % 3.4  --   --    LYMPHS PCT AUTO %  --  6.0 4.4   MONO PCT MAN % 0.8  --   --    MONOS PCT AUTO %  --  3.0 1.8   EOSINO PCT MAN % 0.0  --   --    EOS PCT AUTO %  --  0.0 0.0        Micro/ID:     Lab Results   Component Value Date    BLOODCULT Staphylococcus haemolyticus (AA) 07/27/2024         Assessment and Plan     Assessment:  Edwar Hemphill is a 39 y.o. male with PMHx significant for ESRD (MWF  via right femoral line), NICM,  HFrEF (LVEF  35-40% on 5/2024 POLI), anemia, DM2, Left AKA, RUE amputation, HTN, PVD, GERD.  Admitted  with septic shock required pressors and infective endocarditis with large TV vegetation, w/ bcx growing GPCs with staph aureus ( with history MRSA bacteremia before). Started on vanc/zosyn, fluid resuscitated, ID, cardiology and vascular medicine consulted. On TTE showed TV vegetation and possible source could be femoral catheter with attached vegetations, continued on CVVH based on nephrology consult. Changed zosyn to ceftaroline on 7/23. We were not able to take off the line as a source of infection given his DVTs in subclavian, LIJ, b/l innominate occlusion and complete occlusion of intrahepatic IVC its hard to find another access at this time. IR, cardiology, ID, Vascular medicine are on board. he also had  Acinetobacter baumanni positive on his wound culture. POLI has done 7/24/24 showed large vegetation on the catheter measures at least 1.9cm x 2.5 cm with  involvement of the TV with likely destruction of the septal leaflet of the TV and resultant severe TR.  He is on CVVH. CTA showed Severely attenuated superior vena cava, bilateral brachiocephalic,  subclavian and internal jugular veins with the calcifications in the brachiocephalic veins with extensive collaterals in the chest wall likely representing chronic thrombosis. Endovascular consulted and they  Tentatively planning for vegectomy with Dr. Dennis Monday 7/29. Still wrist fluid tap, culture pending.     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   -positive staph aureus bacteremia   -positive second blood culture with Gram positive cocci, clusters, on 7/25  - Ceftaroline started on 7/23 and Vancomycin started on 7/22   -palliative care consulted and patient said he wants to receive treatments        Plan:  -continue on levo   -FU with endovascular sx   -c/w ceftaroline and vancomycin   -repeat BC in 48 h  -FU on wrist fluid culture         #HFrecEF   - last EF 39% (lowest EF 26%)   - Presumed ICM          # hx of paroxysmal Afib  #Multiple line-related DVTs   - CHADSVASC 6 (DM, PAD, TIA, HTN, HF)   - b/l subclavian DVT, L IJ DVT, b/l innominate occlusion, complete occlusion of intrahepatic IVC s/p angioplasty   - priorly on Eliquis however recent GI bleed 6/15 at [facility] , eliquis was held and patient was planned for heparin trial but left ama prior to trial   - not a candidate for IVC filter given femoral TDC   - last DVT US w/ resolution of RLE DVT   -discontinued heparin due to low plts   -PF4 with normal activity     Plan:  -hold on anticoagulation for now         PULMONARY:  NAVI  CXR at      RENAL/GENITOURINARY:  #ESRD MWF thru right femoral line   -  iHD TTS since 2016, anuric   - RUE AVG c/b infection s/p RUE amputation above elbow 2021   - multiple TDCs (including iliolumbar)   - femoral TDC recently placed at  (14.5 Serbian and 42 cm long)  - lokelma 5 MWF Held iso of hypokalemia  - home sevelmer held  Plan   -continue on CVVH now and continue FU with nephrology           GASTROENTEROLOGY:  #Esophagitis   #GERD  ::EGD on 6/8 clipped two foci of active esophageal bleeding   Plan:  -cw home PPI BID        ENDOCRINOLOGY:  #hypothyroidism  -last TSH 1.9  Plan:  -cw home levo 125     #T1DM  -home regiment : 8u glargine, SSI  -Hba1c 7.3  Plan:   -home regiment insulin 8u glargine  -ordered 4 unites glargine and SSI #2     HEMATOLOGY:  #Chronic anemia  #Anemia of chronic disease  -s/p transfusion  of 1 Unit RBCs  -Hb 8.5 today   Plan:  - c/w home epo m/w/f  -trend cbc   -if Hb<7 transfuse      #Thrombocytopenia  -no concern for KALLIE now, possibly due to active infection   -PF4 activity normal  -Fibrinogen 201, INR 1.4, aPTT 34, PT 15  -s/p 2 units plt transfusion  -plt down trending to 44 today   Plan:  -FU on CBC         MUSCULOSKELETAL/ SKIN:  NAVI     INFECTIOUS DISEASE:  #Sepsis with line as Likely  source  #endocarditis   - repeat bcx in48h  -c/w vanc/ceftaroline  - has previously needed double coverage for 4 weeks with van/dapto to cover infection. But developed lung toxicity 2/2 dapto. And switched to linezolid.  Plan:  - discuss with cardiac sx and endovascular sx in terms of sx plan   -after find a second line can consider removing femoral line as a source of inf  -FU with ID             ICU Check List      FEN:  Fluids: restricted   Electrolytes: k>4 mg>2 caution as ESRD  Nutrition: renal diet  DVT ppx: none  GI ppx: PPI  Bowel care: Miralax prn  Access:  PIV, Femoral line        Social:  Code: Full Code    NOK:   Extended Emergency Contact Information  Primary Emergency Contact: Shanthi Hemphill  Mobile Phone: [phone]  Relation: Parent  Secondary Emergency Contact: Terra Scott  Mobile Phone: [phone]       Tucker Irwin MD   07/28/24 at 1:30 PM     Disclaimer: Documentation completed with the information available at the time of input. The times in the chart may not be reflective of actual patient care times, interventions, or procedures. Documentation occurs after the physical care of the patient.

## 2024-07-28 NOTE — PROGRESS NOTES
Vancomycin Dosing by Pharmacy- FOLLOW UP    Edwar Hemphill is a 39 y.o. year old male who Pharmacy has been consulted for vancomycin dosing for other bacteremia . Based on the patient's indication and renal status this patient is being dosed based on a goal trough/random level of 15-20.     Renal function is currently on CVVH    Current vancomycin dose: 500 mg given every 12 hours    Most recent random level: 19 mcg/mL    Visit Vitals  /74   Pulse 79   Temp 35.6 °C (96.1 °F) (Temporal)   Resp 16        Lab Results   Component Value Date    CREATININE 1.65 (H) 2024    CREATININE 1.81 (H) 2024    CREATININE 1.98 (H) 2024    CREATININE 2.07 (H) 2024        Patient weight is as follows:   Vitals:    24 0500   Weight: 75.4 kg (166 lb 3.6 oz)       Cultures:  Susceptibility data for the encounter in last 14 days.  Collected Specimen Info Organism Clindamycin Erythromycin Oxacillin Rifampin Tetracycline Trimethoprim/Sulfamethoxazole Vancomycin   24 Blood culture from Peripheral Venipuncture Staphylococcus haemolyticus          24 Blood culture from Peripheral Venipuncture Staphylococcus aureus          24 Blood culture from Peripheral Venipuncture Staphylococcus aureus          24 Blood culture from Peripheral Venipuncture Methicillin Resistant Staphylococcus aureus (MRSA)  R  R  R  I  R  S  S   24 Blood culture from Peripheral Venipuncture Staphylococcus aureus               I/O last 3 completed shifts:  In: 1477.3 (20.8 mL/kg) [P.O.:1140; I.V.:87.3 (1.2 mL/kg); IV Piggyback:250]  Out: 1789 (25.2 mL/kg) [Other:1789]  Dosing Weight: 70.9 kg   I/O during current shift:  I/O this shift:  In: -   Out: 196 [Other:196]    Temp (24hrs), Av.8 °C (96.4 °F), Min:35.2 °C (95.4 °F), Max:36.8 °C (98.2 °F)      Assessment/Plan    Within goal random/trough level  The next level will be obtained on  at mid AM lab draw. May be obtained sooner if clinically indicated.    Will continue to monitor renal function daily while on vancomycin and order serum creatinine at least every 48 hours if not already ordered.  Follow for continued vancomycin needs, clinical response, and signs/symptoms of toxicity.       Eva Hutson, PharmD

## 2024-07-28 NOTE — CARE PLAN
Problem: Skin  Goal: Prevent/manage excess moisture  Outcome: Progressing     Problem: Fall/Injury  Goal: Not fall by end of shift  Outcome: Progressing  Goal: Be free from injury by end of the shift  Outcome: Progressing   The patient's goals for the shift include  eat sunflower seeds    The clinical goals for the shift include VS within MD ordered parameters    Over the shift, the patient did not make progress toward the following goals. Barriers to progression include chronic illness. Recommendations to address these barriers include per MD order.

## 2024-07-29 ENCOUNTER — ANESTHESIA (OUTPATIENT)
Dept: OPERATING ROOM | Facility: HOSPITAL | Age: 39
End: 2024-07-29
Payer: COMMERCIAL

## 2024-07-29 ENCOUNTER — ANESTHESIA EVENT (OUTPATIENT)
Dept: OPERATING ROOM | Facility: HOSPITAL | Age: 39
End: 2024-07-29
Payer: COMMERCIAL

## 2024-07-29 ENCOUNTER — APPOINTMENT (OUTPATIENT)
Dept: RADIOLOGY | Facility: HOSPITAL | Age: 39
End: 2024-07-29
Payer: COMMERCIAL

## 2024-07-29 PROBLEM — M00.9: Status: ACTIVE | Noted: 2024-07-22

## 2024-07-29 PROBLEM — M00.032: Status: ACTIVE | Noted: 2024-07-22

## 2024-07-29 LAB
ALBUMIN SERPL BCP-MCNC: 2.4 G/DL (ref 3.4–5)
ALBUMIN SERPL BCP-MCNC: 2.5 G/DL (ref 3.4–5)
ALP SERPL-CCNC: 188 U/L (ref 33–120)
ALT SERPL W P-5'-P-CCNC: 52 U/L (ref 10–52)
ANION GAP BLDV CALCULATED.4IONS-SCNC: 8 MMOL/L (ref 10–25)
ANION GAP SERPL CALC-SCNC: 13 MMOL/L
ANION GAP SERPL CALC-SCNC: 13 MMOL/L (ref 10–20)
AST SERPL W P-5'-P-CCNC: 59 U/L (ref 9–39)
BACTERIA BLD AEROBE CULT: ABNORMAL
BACTERIA BLD AEROBE CULT: ABNORMAL
BACTERIA BLD CULT: ABNORMAL
BACTERIA BLD CULT: ABNORMAL
BASE EXCESS BLDV CALC-SCNC: 3.1 MMOL/L (ref -2–3)
BASOPHILS # BLD AUTO: 0.01 X10*3/UL (ref 0–0.1)
BASOPHILS NFR BLD AUTO: 0.1 %
BILIRUB SERPL-MCNC: 0.6 MG/DL (ref 0–1.2)
BLOOD EXPIRATION DATE: NORMAL
BLOOD EXPIRATION DATE: NORMAL
BODY TEMPERATURE: 37 DEGREES CELSIUS
BUN SERPL-MCNC: 11 MG/DL (ref 6–23)
BUN SERPL-MCNC: 16 MG/DL (ref 6–23)
CA-I BLDV-SCNC: 1.08 MMOL/L (ref 1.1–1.33)
CALCIUM SERPL-MCNC: 7.8 MG/DL (ref 8.6–10.6)
CALCIUM SERPL-MCNC: 8.3 MG/DL (ref 8.6–10.6)
CHLORIDE BLDV-SCNC: 103 MMOL/L (ref 98–107)
CHLORIDE SERPL-SCNC: 101 MMOL/L (ref 98–107)
CHLORIDE SERPL-SCNC: 103 MMOL/L (ref 98–107)
CO2 SERPL-SCNC: 24 MMOL/L (ref 21–32)
CO2 SERPL-SCNC: 26 MMOL/L (ref 21–32)
CREAT SERPL-MCNC: 1.69 MG/DL (ref 0.5–1.3)
CREAT SERPL-MCNC: 2.23 MG/DL (ref 0.5–1.3)
DISPENSE STATUS: NORMAL
DISPENSE STATUS: NORMAL
EGFRCR SERPLBLD CKD-EPI 2021: 37 ML/MIN/1.73M*2
EGFRCR SERPLBLD CKD-EPI 2021: 52 ML/MIN/1.73M*2
EOSINOPHIL # BLD AUTO: 0.02 X10*3/UL (ref 0–0.7)
EOSINOPHIL NFR BLD AUTO: 0.2 %
ERYTHROCYTE [DISTWIDTH] IN BLOOD BY AUTOMATED COUNT: 20.3 % (ref 11.5–14.5)
ERYTHROCYTE [DISTWIDTH] IN BLOOD BY AUTOMATED COUNT: 21.9 % (ref 11.5–14.5)
GLUCOSE BLD MANUAL STRIP-MCNC: 112 MG/DL (ref 74–99)
GLUCOSE BLD MANUAL STRIP-MCNC: 219 MG/DL (ref 74–99)
GLUCOSE BLD MANUAL STRIP-MCNC: 272 MG/DL (ref 74–99)
GLUCOSE BLD MANUAL STRIP-MCNC: 87 MG/DL (ref 74–99)
GLUCOSE BLD MANUAL STRIP-MCNC: 87 MG/DL (ref 74–99)
GLUCOSE BLDV-MCNC: ABNORMAL MG/DL
GLUCOSE SERPL-MCNC: 264 MG/DL (ref 74–99)
GLUCOSE SERPL-MCNC: 73 MG/DL (ref 74–99)
GRAM STN SPEC: ABNORMAL
GRAM STN SPEC: ABNORMAL
HCO3 BLDV-SCNC: 27 MMOL/L (ref 22–26)
HCT VFR BLD AUTO: 20 % (ref 41–52)
HCT VFR BLD AUTO: 26.3 % (ref 41–52)
HCT VFR BLD EST: ABNORMAL %
HGB BLD-MCNC: 6.6 G/DL (ref 13.5–17.5)
HGB BLD-MCNC: 8.4 G/DL (ref 13.5–17.5)
HGB BLDV-MCNC: <3 G/DL (ref 13.5–17.5)
HYPOCHROMIA BLD QL SMEAR: NORMAL
IMM GRANULOCYTES # BLD AUTO: 0.31 X10*3/UL (ref 0–0.7)
IMM GRANULOCYTES NFR BLD AUTO: 2.8 % (ref 0–0.9)
INHALED O2 CONCENTRATION: 21 %
LACTATE BLDV-SCNC: 2.8 MMOL/L (ref 0.4–2)
LYMPHOCYTES # BLD AUTO: 1.2 X10*3/UL (ref 1.2–4.8)
LYMPHOCYTES NFR BLD AUTO: 10.8 %
MAGNESIUM SERPL-MCNC: 2.21 MG/DL (ref 1.6–2.4)
MCH RBC QN AUTO: 28.8 PG (ref 26–34)
MCH RBC QN AUTO: 29.3 PG (ref 26–34)
MCHC RBC AUTO-ENTMCNC: 31.9 G/DL (ref 32–36)
MCHC RBC AUTO-ENTMCNC: 33 G/DL (ref 32–36)
MCV RBC AUTO: 87 FL (ref 80–100)
MCV RBC AUTO: 92 FL (ref 80–100)
MONOCYTES # BLD AUTO: 0.41 X10*3/UL (ref 0.1–1)
MONOCYTES NFR BLD AUTO: 3.7 %
NEUTROPHILS # BLD AUTO: 9.17 X10*3/UL (ref 1.2–7.7)
NEUTROPHILS NFR BLD AUTO: 82.4 %
NRBC BLD-RTO: 0.3 /100 WBCS (ref 0–0)
NRBC BLD-RTO: 0.5 /100 WBCS (ref 0–0)
OXYHGB MFR BLDV: ABNORMAL %
PATH REVIEW-CELL CT,FLUID: NORMAL
PCO2 BLDV: 38 MM HG (ref 41–51)
PH BLDV: 7.46 PH (ref 7.33–7.43)
PHOSPHATE SERPL-MCNC: 2.3 MG/DL (ref 2.5–4.9)
PHOSPHATE SERPL-MCNC: 3.5 MG/DL (ref 2.5–4.9)
PLATELET # BLD AUTO: 44 X10*3/UL (ref 150–450)
PLATELET # BLD AUTO: 84 X10*3/UL (ref 150–450)
PO2 BLDV: <6 MM HG (ref 35–45)
POTASSIUM BLDV-SCNC: 4.3 MMOL/L (ref 3.5–5.3)
POTASSIUM SERPL-SCNC: 3.9 MMOL/L (ref 3.5–5.3)
POTASSIUM SERPL-SCNC: 4.1 MMOL/L (ref 3.5–5.3)
PRODUCT BLOOD TYPE: 5100
PRODUCT BLOOD TYPE: 6200
PRODUCT CODE: NORMAL
PRODUCT CODE: NORMAL
PROT SERPL-MCNC: 5.1 G/DL (ref 6.4–8.2)
RBC # BLD AUTO: 2.29 X10*6/UL (ref 4.5–5.9)
RBC # BLD AUTO: 2.87 X10*6/UL (ref 4.5–5.9)
RBC MORPH BLD: NORMAL
SAO2 % BLDV: ABNORMAL %
SODIUM BLDV-SCNC: 134 MMOL/L (ref 136–145)
SODIUM SERPL-SCNC: 136 MMOL/L (ref 136–145)
SODIUM SERPL-SCNC: 136 MMOL/L (ref 136–145)
UNIT ABO: NORMAL
UNIT ABO: NORMAL
UNIT NUMBER: NORMAL
UNIT NUMBER: NORMAL
UNIT RH: NORMAL
UNIT RH: NORMAL
UNIT VOLUME: 178
UNIT VOLUME: 290
WBC # BLD AUTO: 11.1 X10*3/UL (ref 4.4–11.3)
WBC # BLD AUTO: 17.2 X10*3/UL (ref 4.4–11.3)
XM INTEP: NORMAL

## 2024-07-29 PROCEDURE — 2500000001 HC RX 250 WO HCPCS SELF ADMINISTERED DRUGS (ALT 637 FOR MEDICARE OP): Performed by: INTERNAL MEDICINE

## 2024-07-29 PROCEDURE — 73100 X-RAY EXAM OF WRIST: CPT | Mod: LT

## 2024-07-29 PROCEDURE — 84100 ASSAY OF PHOSPHORUS: CPT

## 2024-07-29 PROCEDURE — C1760 CLOSURE DEV, VASC: HCPCS | Performed by: HOSPITALIST

## 2024-07-29 PROCEDURE — 2550000001 HC RX 255 CONTRASTS: Performed by: HOSPITALIST

## 2024-07-29 PROCEDURE — 2500000005 HC RX 250 GENERAL PHARMACY W/O HCPCS: Performed by: STUDENT IN AN ORGANIZED HEALTH CARE EDUCATION/TRAINING PROGRAM

## 2024-07-29 PROCEDURE — 75825 VEIN X-RAY TRUNK: CPT | Performed by: HOSPITALIST

## 2024-07-29 PROCEDURE — 84132 ASSAY OF SERUM POTASSIUM: CPT | Performed by: INTERNAL MEDICINE

## 2024-07-29 PROCEDURE — 2500000004 HC RX 250 GENERAL PHARMACY W/ HCPCS (ALT 636 FOR OP/ED)

## 2024-07-29 PROCEDURE — 0KBB0ZZ EXCISION OF LEFT LOWER ARM AND WRIST MUSCLE, OPEN APPROACH: ICD-10-PCS | Performed by: STUDENT IN AN ORGANIZED HEALTH CARE EDUCATION/TRAINING PROGRAM

## 2024-07-29 PROCEDURE — 87102 FUNGUS ISOLATION CULTURE: CPT | Performed by: INTERNAL MEDICINE

## 2024-07-29 PROCEDURE — 87205 SMEAR GRAM STAIN: CPT | Performed by: INTERNAL MEDICINE

## 2024-07-29 PROCEDURE — 2500000005 HC RX 250 GENERAL PHARMACY W/O HCPCS: Performed by: HOSPITALIST

## 2024-07-29 PROCEDURE — 85027 COMPLETE CBC AUTOMATED: CPT | Performed by: INTERNAL MEDICINE

## 2024-07-29 PROCEDURE — 3600000007 HC OR TIME - EACH INCREMENTAL 1 MINUTE - PROCEDURE LEVEL TWO: Performed by: STUDENT IN AN ORGANIZED HEALTH CARE EDUCATION/TRAINING PROGRAM

## 2024-07-29 PROCEDURE — 84132 ASSAY OF SERUM POTASSIUM: CPT

## 2024-07-29 PROCEDURE — 99152 MOD SED SAME PHYS/QHP 5/>YRS: CPT | Performed by: HOSPITALIST

## 2024-07-29 PROCEDURE — 2500000004 HC RX 250 GENERAL PHARMACY W/ HCPCS (ALT 636 FOR OP/ED): Performed by: STUDENT IN AN ORGANIZED HEALTH CARE EDUCATION/TRAINING PROGRAM

## 2024-07-29 PROCEDURE — 36010 PLACE CATHETER IN VEIN: CPT | Performed by: HOSPITALIST

## 2024-07-29 PROCEDURE — P9037 PLATE PHERES LEUKOREDU IRRAD: HCPCS

## 2024-07-29 PROCEDURE — 76937 US GUIDE VASCULAR ACCESS: CPT | Performed by: HOSPITALIST

## 2024-07-29 PROCEDURE — 87040 BLOOD CULTURE FOR BACTERIA: CPT

## 2024-07-29 PROCEDURE — 2500000004 HC RX 250 GENERAL PHARMACY W/ HCPCS (ALT 636 FOR OP/ED): Performed by: INTERNAL MEDICINE

## 2024-07-29 PROCEDURE — 2500000005 HC RX 250 GENERAL PHARMACY W/O HCPCS

## 2024-07-29 PROCEDURE — P9016 RBC LEUKOCYTES REDUCED: HCPCS

## 2024-07-29 PROCEDURE — 99291 CRITICAL CARE FIRST HOUR: CPT

## 2024-07-29 PROCEDURE — 3700000002 HC GENERAL ANESTHESIA TIME - EACH INCREMENTAL 1 MINUTE: Performed by: STUDENT IN AN ORGANIZED HEALTH CARE EDUCATION/TRAINING PROGRAM

## 2024-07-29 PROCEDURE — 36415 COLL VENOUS BLD VENIPUNCTURE: CPT | Performed by: INTERNAL MEDICINE

## 2024-07-29 PROCEDURE — 2020000001 HC ICU ROOM DAILY

## 2024-07-29 PROCEDURE — 2500000004 HC RX 250 GENERAL PHARMACY W/ HCPCS (ALT 636 FOR OP/ED): Mod: JZ

## 2024-07-29 PROCEDURE — 25105 REMOVE WRIST JOINT LINING: CPT | Performed by: STUDENT IN AN ORGANIZED HEALTH CARE EDUCATION/TRAINING PROGRAM

## 2024-07-29 PROCEDURE — 6350000001 HC RX 635 EPOETIN >10,000 UNITS: Mod: JZ

## 2024-07-29 PROCEDURE — 83735 ASSAY OF MAGNESIUM: CPT

## 2024-07-29 PROCEDURE — 2500000002 HC RX 250 W HCPCS SELF ADMINISTERED DRUGS (ALT 637 FOR MEDICARE OP, ALT 636 FOR OP/ED)

## 2024-07-29 PROCEDURE — 3600000002 HC OR TIME - INITIAL BASE CHARGE - PROCEDURE LEVEL TWO: Performed by: STUDENT IN AN ORGANIZED HEALTH CARE EDUCATION/TRAINING PROGRAM

## 2024-07-29 PROCEDURE — 2780000003 HC OR 278 NO HCPCS: Performed by: HOSPITALIST

## 2024-07-29 PROCEDURE — 36430 TRANSFUSION BLD/BLD COMPNT: CPT

## 2024-07-29 PROCEDURE — 99153 MOD SED SAME PHYS/QHP EA: CPT | Performed by: HOSPITALIST

## 2024-07-29 PROCEDURE — 85025 COMPLETE CBC W/AUTO DIFF WBC: CPT

## 2024-07-29 PROCEDURE — 2500000001 HC RX 250 WO HCPCS SELF ADMINISTERED DRUGS (ALT 637 FOR MEDICARE OP)

## 2024-07-29 PROCEDURE — A11042 PR DEBRIDEMENT, SKIN, SUB-Q TISSUE,=<20 SQ CM: Performed by: STUDENT IN AN ORGANIZED HEALTH CARE EDUCATION/TRAINING PROGRAM

## 2024-07-29 PROCEDURE — 3700000001 HC GENERAL ANESTHESIA TIME - INITIAL BASE CHARGE: Performed by: STUDENT IN AN ORGANIZED HEALTH CARE EDUCATION/TRAINING PROGRAM

## 2024-07-29 PROCEDURE — 90945 DIALYSIS ONE EVALUATION: CPT | Performed by: INTERNAL MEDICINE

## 2024-07-29 PROCEDURE — B519ZZZ FLUOROSCOPY OF INFERIOR VENA CAVA: ICD-10-PCS | Performed by: HOSPITALIST

## 2024-07-29 PROCEDURE — 2720000007 HC OR 272 NO HCPCS: Performed by: HOSPITALIST

## 2024-07-29 PROCEDURE — 76937 US GUIDE VASCULAR ACCESS: CPT

## 2024-07-29 PROCEDURE — 82947 ASSAY GLUCOSE BLOOD QUANT: CPT

## 2024-07-29 PROCEDURE — 73100 X-RAY EXAM OF WRIST: CPT | Mod: LEFT SIDE | Performed by: RADIOLOGY

## 2024-07-29 PROCEDURE — G0269 OCCLUSIVE DEVICE IN VEIN ART: HCPCS | Mod: TC | Performed by: HOSPITALIST

## 2024-07-29 PROCEDURE — C1894 INTRO/SHEATH, NON-LASER: HCPCS | Performed by: HOSPITALIST

## 2024-07-29 RX ORDER — LIDOCAINE HYDROCHLORIDE 20 MG/ML
INJECTION, SOLUTION INFILTRATION; PERINEURAL AS NEEDED
Status: DISCONTINUED | OUTPATIENT
Start: 2024-07-29 | End: 2024-07-29 | Stop reason: HOSPADM

## 2024-07-29 RX ORDER — LIDOCAINE HYDROCHLORIDE 10 MG/ML
INJECTION, SOLUTION EPIDURAL; INFILTRATION; INTRACAUDAL; PERINEURAL AS NEEDED
Status: DISCONTINUED | OUTPATIENT
Start: 2024-07-29 | End: 2024-07-29 | Stop reason: HOSPADM

## 2024-07-29 RX ORDER — PROPOFOL 10 MG/ML
INJECTION, EMULSION INTRAVENOUS AS NEEDED
Status: DISCONTINUED | OUTPATIENT
Start: 2024-07-29 | End: 2024-07-29

## 2024-07-29 RX ORDER — MIDODRINE HYDROCHLORIDE 10 MG/1
10 TABLET ORAL ONCE
Status: COMPLETED | OUTPATIENT
Start: 2024-07-29 | End: 2024-07-29

## 2024-07-29 RX ORDER — ONDANSETRON HYDROCHLORIDE 2 MG/ML
INJECTION, SOLUTION INTRAVENOUS AS NEEDED
Status: DISCONTINUED | OUTPATIENT
Start: 2024-07-29 | End: 2024-07-29

## 2024-07-29 RX ORDER — CALCIUM GLUCONATE 20 MG/ML
1 INJECTION, SOLUTION INTRAVENOUS EVERY 4 HOURS
Status: COMPLETED | OUTPATIENT
Start: 2024-07-29 | End: 2024-07-29

## 2024-07-29 RX ORDER — VANCOMYCIN HYDROCHLORIDE 1 G/20ML
INJECTION, POWDER, LYOPHILIZED, FOR SOLUTION INTRAVENOUS AS NEEDED
Status: DISCONTINUED | OUTPATIENT
Start: 2024-07-29 | End: 2024-07-29 | Stop reason: HOSPADM

## 2024-07-29 RX ORDER — NOREPINEPHRINE BITARTRATE/D5W 8 MG/250ML
0-.5 PLASTIC BAG, INJECTION (ML) INTRAVENOUS CONTINUOUS
Status: DISCONTINUED | OUTPATIENT
Start: 2024-07-29 | End: 2024-08-01

## 2024-07-29 RX ORDER — NOREPINEPHRINE BITARTRATE/D5W 8 MG/250ML
PLASTIC BAG, INJECTION (ML) INTRAVENOUS
Status: COMPLETED
Start: 2024-07-29 | End: 2024-07-29

## 2024-07-29 SDOH — HEALTH STABILITY: MENTAL HEALTH: CURRENT SMOKER: 0

## 2024-07-29 ASSESSMENT — PAIN SCALES - GENERAL
PAINLEVEL_OUTOF10: 0 - NO PAIN
PAINLEVEL_OUTOF10: 5 - MODERATE PAIN
PAINLEVEL_OUTOF10: 0 - NO PAIN
PAINLEVEL_OUTOF10: 0 - NO PAIN
PAIN_LEVEL: 0
PAINLEVEL_OUTOF10: 0 - NO PAIN

## 2024-07-29 ASSESSMENT — COGNITIVE AND FUNCTIONAL STATUS - GENERAL
CLIMB 3 TO 5 STEPS WITH RAILING: TOTAL
EATING MEALS: A LITTLE
MOBILITY SCORE: 7
TOILETING: TOTAL
WALKING IN HOSPITAL ROOM: TOTAL
TURNING FROM BACK TO SIDE WHILE IN FLAT BAD: TOTAL
MOVING TO AND FROM BED TO CHAIR: TOTAL
HELP NEEDED FOR BATHING: TOTAL
DRESSING REGULAR UPPER BODY CLOTHING: A LOT
MOVING FROM LYING ON BACK TO SITTING ON SIDE OF FLAT BED WITH BEDRAILS: A LOT
DAILY ACTIVITIY SCORE: 11
DRESSING REGULAR LOWER BODY CLOTHING: TOTAL
PERSONAL GROOMING: A LITTLE
STANDING UP FROM CHAIR USING ARMS: TOTAL

## 2024-07-29 ASSESSMENT — PAIN - FUNCTIONAL ASSESSMENT
PAIN_FUNCTIONAL_ASSESSMENT: 0-10

## 2024-07-29 NOTE — SIGNIFICANT EVENT
Orthopaedic Hand Surgery Significant Event Note  07/29/24    Subjective:    Left wrist aspirate 7/27 w/ positive culture for MRSA. Patient remains in MICU w/ MRSA bacteremia.     Assessment/Plan: 39 y.o. LHD male with PMHx ESRD, cardiomyopathy, HFrEF, DM2, HTN, PVD, GERD, and prior RUE transhumeral amputation and L AKA who was admitted to MICU on 7/22 with septic shock (MRSA bacteremia) and infective endocarditis for whom orthopaedic hand surgery was consulted due to concern for left wrist septic arthritis. Patient endorses left wrist pain yesterday and states that it is greatly improved today. He denies fevers/chills and numbness/tingling in the left hand. He is currently on vancomycin and ceftaroline at the recommendation of Infectious Disease for his systemic/cardiac infections. He does have a history of left wrist septic arthritis s/p I&D at [facility] in 2021.     Currently NPO for endovascular procedure. Will consent and post for left wrist irrigation and debridement given positive aspirate results.     Plan:  - OR: posted for L wrist I&D w/ Dr. Allen 7/29   - Will need consent  - Weight bearing: WBAT LUE  - DVT ppx: SCDs, please hold chemoppx for OR  - Diet: NPO for OR tonight   - Pain: multimodal pain control per primary  - Antibiotics: Vancomycin per pharmacy  - ID following, appreciate recs  - Lines: maintain PIVx2 while inpatient    Dispo: Pending OR for L wrist irrigation & debridement 7/29    Norbert Yin MD, MD  Orthopedic Surgery PGY-2  [facility]  Available by Epic Chat    While inpatient, this patient will be followed by the Orthopaedic Hand team. Please see contact information below:    Ortho Hand  Norbert Yin MD PGY2  Ankita Dyson MD PGY4    Please page [phone] (ortho on-call) after 6pm and on weekends.

## 2024-07-29 NOTE — PROGRESS NOTES
"Music Therapy Note    Edwar Hemphill was referred by     Therapy Session  Referral Type: New referral this admission  Visit Type: Follow-up visit  Session Start Time: 1424  Session End Time: 1426  Intervention Delivery: In-person  Conflict of Service: Declined treatment  Number of family members present: 1  Family Present for Session: Other (Comment)  Family Participation: Supportive     Pre-assessment  Unable to Assess Reason: Emotional distress  Mood/Affect: Flat/blunted  Verbalized Emotional State: Frustration, Anger (\"I just want to get out of this place\")         Treatment/Interventions       Post-assessment  Total Session Time (min): 2 minutes    Narrative  Assessment Detail: Patient recalled MT from previous session. Reported that he hadn't played the piano at all and asked MT to take it back. Displayed frustration. Family present in the room. MT left phone number with family in case patient changes his mind about the piano; gratitude expressed.  Follow-up: MT will continue to follow    Education Documentation  No documentation found.          "

## 2024-07-29 NOTE — POST-PROCEDURE NOTE
Physician Transition of Care Summary  Invasive Cardiovascular Lab    Procedure Date: 7/29/2024  Attending:    Efrain Dennis - Primary  Resident/Fellow/Other Assistant: Surgeons and Role:     * Michael Castillo MD - Fellow    Indications:   Pre-op Diagnosis      * Acute infective endocarditis, due to unspecified organism (HHS-HCC) [I33.0]    Post-procedure diagnosis:   Post-op Diagnosis     * Acute infective endocarditis, due to unspecified organism (HHS-HCC) [I33.0]    Procedure(s):     * Vegatation Removal    * Procedure aborted - Cath    Background:  Patient is a 39-year-old man with complex past medical history including ESRD on HD, HFrEF [EF 35-40%], diabetes, PAD status post left AKA and right forearm amputation, and multiple recurrent DVTs including line-related DVTs and intrahepatic IVC occlusion [had angioplasty on 10/25/2022 at [facility] with the placement of infradiaphragmatic temporary dialysis catheter], GI bleed [off anticoagulation], anemia, thrombocytopenia, moderate size PFO, and many other comorbidities, who was initially admitted with septic shock [resolved] and septic left wrist arthritis with the finding of persistent MRSA bacteremia, and a large vegetation at the tip of the tunneled dialysis catheter in right atrium with severe TR.    Procedure Findings:   -Patient has right groin tunneled dialysis catheter [TDC] extending into the IVC-RA junction with near occlusion of his intrahepatic IVC.  -Planned procedure of vegetation removal from the tip of the TDC was aborted.   -We could have possibly performed the vegetectomy via the right internal jugular approach; however, I am concerned that the intrahepatic IVC occlusion represents an infected thrombus, and therefore removing the vegetation from the tip of the TDC by itself would be incomplete treatment and may not help with clearing his MRSA bacteremia.  Furthermore, pursuing an invasive treatment of the above would likely necessitate the removal of  the TDC and patient has very limited options for alternative sites.  Additionally, anticoagulation for such procedure would be challenging as patient has thrombocytopenia and recent GI bleed.        Access of the Procedure:   8 Panamanian left CFV, closed with mild pressure and Vascade.    Complications:   None.    Stents/Implants:   None.    Anticoagulation/Antiplatelet Plan:   None.    Estimated Blood Loss:   5 mL    Anesthesia: Moderate Sedation Anesthesia Staff: No anesthesia staff entered.    Any Specimen(s) Removed:   No specimens collected during this procedure.    Disposition:   Plan to review the case and discuss in details with Dr. Hunter.       Electronically signed by: Grant Dennis MD, 7/29/2024 5:52 PM

## 2024-07-29 NOTE — CARE PLAN
Problem: Skin  Goal: Decreased wound size/increased tissue granulation at next dressing change  Outcome: Progressing  Goal: Participates in plan/prevention/treatment measures  Outcome: Progressing  Goal: Prevent/manage excess moisture  Outcome: Progressing  Goal: Prevent/minimize sheer/friction injuries  Outcome: Progressing  Goal: Promote/optimize nutrition  Outcome: Progressing  Goal: Promote skin healing  Outcome: Progressing   The patient's goals for the shift include      The clinical goals for the shift include Pt will stay off Levophed today.    Over the shift, the patient did not make progress toward the following goals. Barriers to progression include Critical state and potentially nutrition

## 2024-07-29 NOTE — PROGRESS NOTES
Edwar Hemphill   39 lucy    @@  N/Room: [id number]/24/24-A    Subjective: Plan for vegectomy today so CVVH stopped. Denies any acute complaints.     Objective:     Meds:   atorvastatin, 40 mg, Nightly  B complex-vitamin C-folic acid, 1 capsule, Daily  ceftaroline, 400 mg, q8h  cholecalciferol, 50,000 Units, Once per day on Monday Thursday  epoetin tyson or biosimilar, 10,000 Units, Once per day on Monday Wednesday Friday  folic acid, 1 mg, Daily  insulin glargine, 5 Units, Nightly  insulin lispro, 0-10 Units, TID  levothyroxine, 125 mcg, Daily before breakfast  melatonin, 6 mg, Nightly  midodrine, 10 mg, q8h  nystatin, 1 Application, BID  pantoprazole, 40 mg, Daily before breakfast  thiamine, 200 mg, Daily      PrismaSol 4/2.5, Last Rate: 25 mL/kg/hr (07/27/24 1341)      acetaminophen, 650 mg, q4h PRN   Or  acetaminophen, 650 mg, q4h PRN   Or  acetaminophen, 650 mg, q4h PRN  benzocaine, , PRN  dextrose, 12.5 g, q15 min PRN  dextrose, 25 g, q15 min PRN  diphenhydrAMINE, 25 mg, q6h PRN  fentaNYL, , PRN  glucagon, 1 mg, q15 min PRN  glucagon, 1 mg, q15 min PRN  heparin, 1,000 Units, PRN  heparin, 1,000 Units, PRN  lidocaine, , 4x daily PRN  midazolam, , PRN  vancomycin, , Daily PRN        Vitals:    07/29/24 1312   BP: 77/55   Pulse: 91   Resp: 15   Temp: 36 °C (96.8 °F)   SpO2: 96%          Intake/Output Summary (Last 24 hours) at 7/29/2024 1326  Last data filed at 7/29/2024 1245  Gross per 24 hour   Intake 1206 ml   Output 390 ml   Net 816 ml       General appearance: no distress  Heart: RRR  Lungs: CTA bilat   Abdomen: soft, nt/nd  Extremities: no edema bilat  Neuro: No FND  Access: femoral TDC     Blood Labs:  Results for orders placed or performed during the hospital encounter of 07/22/24 (from the past 24 hour(s))   POCT GLUCOSE   Result Value Ref Range    POCT Glucose 57 (L) 74 - 99 mg/dL   POCT GLUCOSE   Result Value Ref Range    POCT Glucose 105 (H) 74 - 99 mg/dL   POCT GLUCOSE   Result Value Ref Range    POCT  Glucose 148 (H) 74 - 99 mg/dL   CBC and Auto Differential   Result Value Ref Range    WBC 11.1 4.4 - 11.3 x10*3/uL    nRBC 0.3 (H) 0.0 - 0.0 /100 WBCs    RBC 2.29 (L) 4.50 - 5.90 x10*6/uL    Hemoglobin 6.6 (L) 13.5 - 17.5 g/dL    Hematocrit 20.0 (L) 41.0 - 52.0 %    MCV 87 80 - 100 fL    MCH 28.8 26.0 - 34.0 pg    MCHC 33.0 32.0 - 36.0 g/dL    RDW 20.3 (H) 11.5 - 14.5 %    Platelets 44 (L) 150 - 450 x10*3/uL    Neutrophils % 82.4 40.0 - 80.0 %    Immature Granulocytes %, Automated 2.8 (H) 0.0 - 0.9 %    Lymphocytes % 10.8 13.0 - 44.0 %    Monocytes % 3.7 2.0 - 10.0 %    Eosinophils % 0.2 0.0 - 6.0 %    Basophils % 0.1 0.0 - 2.0 %    Neutrophils Absolute 9.17 (H) 1.20 - 7.70 x10*3/uL    Immature Granulocytes Absolute, Automated 0.31 0.00 - 0.70 x10*3/uL    Lymphocytes Absolute 1.20 1.20 - 4.80 x10*3/uL    Monocytes Absolute 0.41 0.10 - 1.00 x10*3/uL    Eosinophils Absolute 0.02 0.00 - 0.70 x10*3/uL    Basophils Absolute 0.01 0.00 - 0.10 x10*3/uL   Blood Gas Venous Full Panel   Result Value Ref Range    POCT pH, Venous 7.46 (H) 7.33 - 7.43 pH    POCT pCO2, Venous 38 (L) 41 - 51 mm Hg    POCT pO2, Venous <6 (L) 35 - 45 mm Hg    POCT SO2, Venous      POCT Oxy Hemoglobin, Venous      POCT Hematocrit Calculated, Venous      POCT Sodium, Venous 134 (L) 136 - 145 mmol/L    POCT Potassium, Venous 4.3 3.5 - 5.3 mmol/L    POCT Chloride, Venous 103 98 - 107 mmol/L    POCT Ionized Calicum, Venous 1.08 (L) 1.10 - 1.33 mmol/L    POCT Glucose, Venous      POCT Lactate, Venous 2.8 (H) 0.4 - 2.0 mmol/L    POCT Base Excess, Venous 3.1 (H) -2.0 - 3.0 mmol/L    POCT HCO3 Calculated, Venous 27.0 (H) 22.0 - 26.0 mmol/L    POCT Hemoglobin, Venous <3.0 (LL) 13.5 - 17.5 g/dL    POCT Anion Gap, Venous 8.0 (L) 10.0 - 25.0 mmol/L    Patient Temperature 37.0 degrees Celsius    FiO2 21 %   Morphology   Result Value Ref Range    RBC Morphology See Below     Hypochromia Mild    Prepare RBC: 1 Units, Leukocytes Reduced (CMV reduced risk)   Result  Value Ref Range    PRODUCT CODE O7709O29     Unit Number [id number]     Unit ABO O     Unit RH POS     XM INTEP COMP     Dispense Status TR     Blood Expiration Date 8/7/2024 11:59:00 PM EDT     PRODUCT BLOOD TYPE 5100     UNIT VOLUME 290    Comprehensive Metabolic Panel   Result Value Ref Range    Glucose 264 (H) 74 - 99 mg/dL    Sodium 136 136 - 145 mmol/L    Potassium 4.1 3.5 - 5.3 mmol/L    Chloride 101 98 - 107 mmol/L    Bicarbonate 26 21 - 32 mmol/L    Anion Gap 13 10 - 20 mmol/L    Urea Nitrogen 11 6 - 23 mg/dL    Creatinine 1.69 (H) 0.50 - 1.30 mg/dL    eGFR 52 (L) >60 mL/min/1.73m*2    Calcium 7.8 (L) 8.6 - 10.6 mg/dL    Albumin 2.4 (L) 3.4 - 5.0 g/dL    Alkaline Phosphatase 188 (H) 33 - 120 U/L    Total Protein 5.1 (L) 6.4 - 8.2 g/dL    AST 59 (H) 9 - 39 U/L    Bilirubin, Total 0.6 0.0 - 1.2 mg/dL    ALT 52 10 - 52 U/L   Magnesium   Result Value Ref Range    Magnesium 2.21 1.60 - 2.40 mg/dL   Phosphorus   Result Value Ref Range    Phosphorus 2.3 (L) 2.5 - 4.9 mg/dL   Blood Culture    Specimen: Peripheral Venipuncture; Blood culture   Result Value Ref Range    Blood Culture Loaded on Instrument - Culture in progress    Blood Culture    Specimen: Peripheral Venipuncture; Blood culture   Result Value Ref Range    Blood Culture Loaded on Instrument - Culture in progress    POCT GLUCOSE   Result Value Ref Range    POCT Glucose 272 (H) 74 - 99 mg/dL   Prepare Platelets: 1 Units, Leukocytes Reduced (CMV reduced risk)   Result Value Ref Range    PRODUCT CODE D7188T98     Unit Number [id number]     Unit ABO A     Unit RH POS     Dispense Status TR     Blood Expiration Date 7/29/2024 11:59:00 PM EDT     PRODUCT BLOOD TYPE 6200     UNIT VOLUME 178    POCT GLUCOSE   Result Value Ref Range    POCT Glucose 219 (H) 74 - 99 mg/dL          Edwar Hemphill is a  39 y.o.  Year old , with PMHx of ESRD on HD (MWF, right femoral line), HFrEF (EF 35-40% 5/2024), T2DM, PVD (s/p left AKA and right forearm amputation),  multiple line-related DVT (RIJ, LIJ, RUE, LUE, RLE, LLE; on Eliquis prior to recent upper GI bleed 6/15) presenting from [facility] due to hypotension prior to dialysis. Admitted to MICU for septic shock 2/2 mrsa bacteremia. Nephrology consulted for dialysis needs.     Updates:  -Pt has L wrist septic arthritis pending I&D  -Plan for vegectomy today  -off levophed     #ESRD on HD MWF via R femoral TDC  #Hx of multiple HD catheter infections, mrsa bacteremia   -HD at [facility], nephrologist Dr. Licona - last HD 7/19  Started on CVVH 7/23 due to hemodynamic instability.   -hemodynamics: off levophed for 24h     #Septic shock 2/2 staph aureus bacteremia  #TV infective endocarditis   -vancomycin, ceftaroline   -Bcx 7/25 staph, 7/27 - staph heamolyticus. Bcx 7/29 pending      #MBD   - Renelva discontinued on 07/24     #Anemia   - epo           RECOMMENDATIONS:  -plan to continue CVVH today after procedure. Off levophed however with tenuous hemodynamics. If stable BP tomorrow can consider discontinuing. Pending further discussion with IR to assess need for TDC removal.  - Supportive treatment as per ICU team.      Mary Hayes DO  Nephrology Fellow   Daytime / Weekend Renal Pager [phone]  After 7 pm Emergencies [phone] Pager [phone]
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
## 2024-07-29 NOTE — POST-PROCEDURE NOTE
Physician Transition of Care Summary  Invasive Cardiovascular Lab    Procedure Date: 7/29/2024  Attending:    Efrain Dennis - Primary  Resident/Fellow/Other Assistant: Surgeons and Role:     * Michael Castillo MD - Fellow    Indications:   Pre-op Diagnosis      * Acute infective endocarditis, due to unspecified organism (HHS-HCC) [I33.0]    Post-procedure diagnosis:   Post-op Diagnosis     * Acute infective endocarditis, due to unspecified organism (HHS-HCC) [I33.0]    Procedure(s):     * Vegatation Removal    * Procedure aborted - Cath      Procedure Findings:   Completely occluded IVC in the supra renal area.  Procedure was aborted.    Description of the Procedure:   Right Common femoral vein/manual pressure    Complications:   None    Stents/Implants:   Implants       No implant documentation for this case.            Anticoagulation/Antiplatelet Plan:   As per primary team.    Estimated Blood Loss:   5 mL    Anesthesia: Moderate Sedation Anesthesia Staff: No anesthesia staff entered.    Any Specimen(s) Removed:   No specimens collected during this procedure.    Disposition:   Inpartient      Electronically signed by: Michael Castillo MD, 7/29/2024 5:51 PM

## 2024-07-29 NOTE — SIGNIFICANT EVENT
Mr. Edwar Hemphill age 39 is under the care of MICU team G[facility] and is deemed to be medically ready for L wrist irrigation & debridement with orthopedic surgery.

## 2024-07-29 NOTE — CARE PLAN
Problem: Pain - Adult  Goal: Verbalizes/displays adequate comfort level or baseline comfort level  Outcome: Progressing  Flowsheets (Taken 7/29/2024 0930)  Verbalizes/displays adequate comfort level or baseline comfort level:   Encourage patient to monitor pain and request assistance   Administer analgesics based on type and severity of pain and evaluate response   Assess pain using appropriate pain scale   Implement non-pharmacological measures as appropriate and evaluate response   Consider cultural and social influences on pain and pain management     Problem: Safety - Adult  Goal: Free from fall injury  Outcome: Progressing  Flowsheets (Taken 7/29/2024 0930)  Free from fall injury: Instruct family/caregiver on patient safety     Problem: Skin  Goal: Decreased wound size/increased tissue granulation at next dressing change  Outcome: Progressing  Flowsheets (Taken 7/27/2024 1636 by Nakul Alejo, RN)  Decreased wound size/increased tissue granulation at next dressing change:   Promote sleep for wound healing   Protective dressings over bony prominences   Utilize specialty bed per algorithm  Goal: Participates in plan/prevention/treatment measures  Outcome: Progressing  Flowsheets (Taken 7/27/2024 1636 by Nakul Alejo, RN)  Participates in plan/prevention/treatment measures:   Discuss with provider PT/OT consult   Elevate heels  Goal: Prevent/manage excess moisture  Outcome: Progressing  Flowsheets (Taken 7/27/2024 1636 by Nakul Alejo, RN)  Prevent/manage excess moisture:   Cleanse incontinence/protect with barrier cream   Monitor for/manage infection if present   Follow provider orders for dressing changes   Use wicking fabric (obtain order)   Moisturize dry skin  Goal: Prevent/minimize sheer/friction injuries  Outcome: Progressing  Flowsheets (Taken 7/27/2024 1636 by Nakul Alejo, RN)  Prevent/minimize sheer/friction injuries:   Complete micro-shifts as needed if patient unable. Adjust patient  position to relieve pressure points, not a full turn   Increase activity/out of bed for meals   Use pull sheet   HOB 30 degrees or less   Utilize specialty bed per algorithm   Turn/reposition every 2 hours/use positioning/transfer devices  Goal: Promote/optimize nutrition  Outcome: Progressing  Flowsheets (Taken 7/25/2024 2023 by Evan Thrasher, HILLARY)  Promote/optimize nutrition:   Consume > 50% meals/supplements   Monitor/record intake including meals  Goal: Promote skin healing  Outcome: Progressing  Flowsheets (Taken 7/27/2024 1636 by Nakul Alejo RN)  Promote skin healing:   Assess skin/pad under line(s)/device(s)   Protective dressings over bony prominences   Turn/reposition every 2 hours/use positioning/transfer devices   Ensure correct size (line/device) and apply per  instructions   Rotate device position/do not position patient on device     Problem: Chronic Conditions and Co-morbidities  Goal: Patient's chronic conditions and co-morbidity symptoms are monitored and maintained or improved  Outcome: Progressing     Problem: Fall/Injury  Goal: Not fall by end of shift  Outcome: Progressing  Goal: Be free from injury by end of the shift  Outcome: Progressing  Goal: Verbalize understanding of personal risk factors for fall in the hospital  Outcome: Progressing     Problem: Diabetes  Goal: Maintain electrolyte levels within acceptable range throughout shift  Outcome: Progressing  Goal: Maintain glucose levels >70mg/dl to <250mg/dl throughout shift  Outcome: Progressing  Goal: No changes in neurological exam by end of shift  Outcome: Progressing  Goal: Learn about and adhere to nutrition recommendations by end of shift  Outcome: Progressing  Goal: Vital signs within normal range for age by end of shift  Outcome: Progressing  Goal: Increase self care and/or family involovement by end of shift  Outcome: Progressing  Goal: Receive DSME education by end of shift  Outcome: Progressing

## 2024-07-29 NOTE — PROGRESS NOTES
Physical Therapy                 Therapy Communication Note    Patient Name: Edwar Hemphill  MRN: [id number]  Today's Date: 7/29/2024     Discipline: Physical Therapy    Missed Visit Reason: Missed Visit Reason: Patient placed on medical hold (patient with OR planning for vegectomy this date. PT will hold and re-attempt as time and schedule allows and as medically appropriate.)    Missed Time: Attempt    Comment:

## 2024-07-29 NOTE — PROGRESS NOTES
"Medical Intensive Care - Daily Progress Note   Subjective    Edwar Hemphill is a 39 y.o. year old male patient admitted on 7/22/2024 with following ICU needs: septic shock required pressors, infective endocarditis     Interval History:  Patient seen and examined at bedside. On CVVH this morning. HDS and off pressors. On room air with normal saturation. He denies any new or worsening symptoms.         Meds    Scheduled medications  atorvastatin, 40 mg, oral, Nightly  B complex-vitamin C-folic acid, 1 capsule, oral, Daily  ceftaroline, 400 mg, intravenous, q8h  cholecalciferol, 50,000 Units, oral, Once per day on Monday Thursday  epoetin tyson or biosimilar, 10,000 Units, subcutaneous, Once per day on Monday Wednesday Friday  folic acid, 1 mg, oral, Daily  insulin glargine, 5 Units, subcutaneous, Nightly  insulin lispro, 0-10 Units, subcutaneous, TID  levothyroxine, 125 mcg, oral, Daily before breakfast  melatonin, 6 mg, oral, Nightly  midodrine, 10 mg, oral, q8h  nystatin, 1 Application, Topical, BID  pantoprazole, 40 mg, oral, Daily before breakfast  thiamine, 200 mg, oral, Daily      Continuous medications  PrismaSol 4/2.5, 25 mL/kg/hr, Last Rate: 25 mL/kg/hr (07/27/24 1341)      PRN medications  PRN medications: acetaminophen **OR** acetaminophen **OR** acetaminophen, benzocaine, dextrose, dextrose, diphenhydrAMINE, fentaNYL, glucagon, glucagon, heparin, heparin, lidocaine, midazolam, vancomycin     Objective    Blood pressure 90/53, pulse 102, temperature 36 °C (96.8 °F), temperature source Temporal, resp. rate 24, height 1.753 m (5' 9.02\"), weight 75.4 kg (166 lb 3.6 oz), SpO2 96%.     Physical Exam   Constitutional:       Appearance: Normal appearance.   HENT:      Mouth/Throat:      Mouth: Mucous membranes are moist.   Cardiovascular:      Rate and Rhythm: Tachycardia present. Diastolic murmur on LSB and pulmonic area      Heart sounds: Normal heart sounds.   Pulmonary:      Effort: Pulmonary effort is normal.    "   Breath sounds: Normal breath sounds.   Abdominal:      Palpations: Abdomen is soft.   Musculoskeletal:      Comments: Left foot amputated above the knee  Right hand amputated above elbow  Posterior calcaneous wound has yellow discharge   Skin:     General: Skin is dry.   Neurological:      General: No focal deficit present.      Mental Status: He is oriented to person, place, and time.   Psychiatric:         Behavior: Behavior normal.         Thought Content: Thought content normal.         Judgment: Judgment normal.        Intake/Output Summary (Last 24 hours) at 7/29/2024 1543  Last data filed at 7/29/2024 1245  Gross per 24 hour   Intake 1206 ml   Output 297 ml   Net 909 ml     Labs:   Results from last 72 hours   Lab Units 07/29/24  0302 07/28/24  0517 07/27/24  2257   SODIUM mmol/L 136 132* 131*   POTASSIUM mmol/L 4.1 3.8 5.3   CHLORIDE mmol/L 101 99 99   CO2 mmol/L 26 27 25   BUN mg/dL 11 10 11   CREATININE mg/dL 1.69* 1.65* 1.81*   GLUCOSE mg/dL 264* 169* 237*   CALCIUM mg/dL 7.8* 8.0* 8.1*   ANION GAP mmol/L 13 10 12   EGFR mL/min/1.73m*2 52* 54* 48*   PHOSPHORUS mg/dL 2.3* 2.2* 2.4*      Results from last 72 hours   Lab Units 07/29/24  0102 07/28/24  0517 07/27/24  0542   WBC AUTO x10*3/uL 11.1 14.8* 12.2*   HEMOGLOBIN g/dL 6.6* 7.7* 7.1*   HEMATOCRIT % 20.0* 24.2* 21.8*   PLATELETS AUTO x10*3/uL 44* 57* 51*   NEUTROS PCT AUTO % 82.4  --  89.3   LYMPHO PCT MAN %  --  3.4  --    LYMPHS PCT AUTO % 10.8  --  6.0   MONO PCT MAN %  --  0.8  --    MONOS PCT AUTO % 3.7  --  3.0   EOSINO PCT MAN %  --  0.0  --    EOS PCT AUTO % 0.2  --  0.0        Micro/ID:     Lab Results   Component Value Date    BLOODCULT Loaded on Instrument - Culture in progress 07/29/2024    BLOODCULT Loaded on Instrument - Culture in progress 07/29/2024         Assessment and Plan     Assessment:  Edwar Hemphill is a 39 y.o. male with PMHx significant for ESRD (MWF  via right femoral line), NICM,  HFrEF (LVEF 35-40% on 5/2024 POLI), anemia,  DM2, Left AKA, RUE amputation, HTN, PVD, GERD.  Admitted  with septic shock required pressors and infective endocarditis with large TV vegetation, w/ bcx growing GPCs with staph aureus ( with history MRSA bacteremia before). Started on vanc/zosyn, fluid resuscitated, ID, cardiology and vascular medicine consulted. On TTE showed TV vegetation and possible source could be femoral catheter with attached vegetations, continued on CVVH based on nephrology consult. Changed zosyn to ceftaroline on 7/23. We were not able to take off the line as a source of infection given his DVTs in subclavian, LIJ, b/l innominate occlusion and complete occlusion of intrahepatic IVC its hard to find another access at this time. IR, cardiology, ID, Vascular medicine are on board. he also had  Acinetobacter baumanni positive on his wound culture. POLI has done 7/24/24 showed large vegetation on the catheter measures at least 1.9cm x 2.5 cm with  involvement of the TV with likely destruction of the septal leaflet of the TV and resultant severe TR.  He is on CVVH. CTA showed Severely attenuated superior vena cava, bilateral brachiocephalic,  subclavian and internal jugular veins with the calcifications in the brachiocephalic veins with extensive collaterals in the chest wall likely representing chronic thrombosis. Endovascular consulted and they Patient is s/p vegectomy with Dr. Dennis.    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 Hammand this afternoon   - F/U with endovascular post procedure to see if stable for wrist washout          #HFrecEF   - last EF 39% (lowest EF 26%)   - Presumed ICM          # hx of paroxysmal Afib  #Multiple line-related DVTs   - CHADSVASC 6 (DM, PAD, TIA, HTN, HF)   - b/l subclavian DVT, L IJ DVT, b/l innominate occlusion, complete occlusion of intrahepatic IVC s/p angioplasty   - priorly on Eliquis however recent GI bleed 6/15 at [facility] , eliquis was held and patient was planned for heparin trial but left ama prior to trial   - not a candidate for IVC filter given femoral TDC   - last DVT US w/ resolution of RLE DVT   -discontinued heparin due to low plts   -PF4 with normal activity       Plan:  -hold on anticoagulation for now           PULMONARY:  NAVI  CXR at      RENAL/GENITOURINARY:  #ESRD MWF thru right femoral line   -  iHD TTS since 2016, anuric   - RUE AVG c/b infection s/p RUE amputation above elbow 2021   - multiple TDCs (including iliolumbar)   - femoral TDC recently placed at  (14.5 [place] and 42 cm long)  - lokelma 5 MWF Held iso of hypokalemia  - home sevelmer held  Plan   -continue on CVVH now and continue FU with nephrology           GASTROENTEROLOGY:  #Esophagitis   #GERD  ::EGD on 6/8 clipped two foci of active esophageal bleeding   Plan:  -cw home PPI BID        ENDOCRINOLOGY:  #hypothyroidism  -last TSH 1.9  Plan:  -cw home levo 125     #T1DM  -home regiment : 8u glargine, SSI  -Hba1c 7.3  Plan:   -home regiment insulin 8u glargine  -ordered 4 unites  glargine and SSI #2     HEMATOLOGY:  #Chronic anemia  #Anemia of chronic disease  Hgb 6.6 7/28 now s/p 1 unit  Plan:  - c/w home epo m/w/f  -trend cbc   -if Hb<7 transfuse      #Thrombocytopenia  -no concern for KALLIE now, possibly due to active infection   -PF4 activity normal  -Fibrinogen 201, INR 1.4, aPTT 34, PT 15  -s/p 2 units plt transfusion  -plt down trending to 44 today now s/p 1 unit platelets   Plan:  -FU on CBC         MUSCULOSKELETAL/ SKIN:  NAVI     INFECTIOUS DISEASE:  #Sepsis with line as Likely  source  #endocarditis   - 7/27 Blood cultures positive for Staph Haemolyticus  - Wrist synovial fluid culture positive for MRSA   - repeat bcx in48h  -c/w vanc/ceftaroline  - has previously needed double coverage for 4 weeks with van/dapto to cover infection. But developed lung toxicity 2/2 dapto. And switched to linezolid.  Plan:  - discuss with cardiac sx and endovascular sx in terms of sx plan   -after find a second line can consider removing femoral line as a source of inf  -FU with ID             ICU Check List      FEN:  Fluids: restricted   Electrolytes: k>4 mg>2 caution as ESRD  Nutrition: renal diet  DVT ppx: none  GI ppx: PPI  Bowel care: Miralax prn  Access:  PIV, Femoral line        Social:  Code: Full Code    NOK:   Extended Emergency Contact Information  Primary Emergency Contact: Shanthi Hemphill  Mobile Phone: [phone]  Relation: Parent  Secondary Emergency Contact: Terra Scott  Mobile Phone: [phone]       Don Hernández MD   07/29/24 at 3:43 PM     Disclaimer: Documentation completed with the information available at the time of input. The times in the chart may not be reflective of actual patient care times, interventions, or procedures. Documentation occurs after the physical care of the patient.

## 2024-07-30 ENCOUNTER — APPOINTMENT (OUTPATIENT)
Dept: RADIOLOGY | Facility: HOSPITAL | Age: 39
End: 2024-07-30
Payer: COMMERCIAL

## 2024-07-30 LAB
ALBUMIN SERPL BCP-MCNC: 2.5 G/DL (ref 3.4–5)
ALP SERPL-CCNC: 191 U/L (ref 33–120)
ALT SERPL W P-5'-P-CCNC: 35 U/L (ref 10–52)
ANION GAP BLDV CALCULATED.4IONS-SCNC: 15 MMOL/L (ref 10–25)
ANION GAP BLDV CALCULATED.4IONS-SCNC: ABNORMAL MMOL/L
ANION GAP SERPL CALC-SCNC: 11 MMOL/L (ref 10–20)
AST SERPL W P-5'-P-CCNC: 21 U/L (ref 9–39)
BACTERIA FLD CULT: ABNORMAL
BASE EXCESS BLDV CALC-SCNC: -4.9 MMOL/L (ref -2–3)
BASE EXCESS BLDV CALC-SCNC: 1 MMOL/L (ref -2–3)
BASOPHILS # BLD MANUAL: 0 X10*3/UL (ref 0–0.1)
BASOPHILS NFR BLD MANUAL: 0 %
BILIRUB SERPL-MCNC: 0.8 MG/DL (ref 0–1.2)
BLOOD EXPIRATION DATE: NORMAL
BLOOD EXPIRATION DATE: NORMAL
BODY TEMPERATURE: 37 DEGREES CELSIUS
BODY TEMPERATURE: 37 DEGREES CELSIUS
BUN SERPL-MCNC: 17 MG/DL (ref 6–23)
CA-I BLDV-SCNC: 1.23 MMOL/L (ref 1.1–1.33)
CA-I BLDV-SCNC: 1.27 MMOL/L (ref 1.1–1.33)
CALCIUM SERPL-MCNC: 8.3 MG/DL (ref 8.6–10.6)
CHLORIDE BLDV-SCNC: 102 MMOL/L (ref 98–107)
CHLORIDE BLDV-SCNC: ABNORMAL MMOL/L
CHLORIDE SERPL-SCNC: 101 MMOL/L (ref 98–107)
CO2 SERPL-SCNC: 27 MMOL/L (ref 21–32)
CREAT SERPL-MCNC: 2.39 MG/DL (ref 0.5–1.3)
DISPENSE STATUS: NORMAL
DISPENSE STATUS: NORMAL
EGFRCR SERPLBLD CKD-EPI 2021: 34 ML/MIN/1.73M*2
EOSINOPHIL # BLD MANUAL: 0 X10*3/UL (ref 0–0.7)
EOSINOPHIL NFR BLD MANUAL: 0 %
ERYTHROCYTE [DISTWIDTH] IN BLOOD BY AUTOMATED COUNT: 22.1 % (ref 11.5–14.5)
ERYTHROCYTE [DISTWIDTH] IN BLOOD BY AUTOMATED COUNT: 22.3 % (ref 11.5–14.5)
GLUCOSE BLD MANUAL STRIP-MCNC: 121 MG/DL (ref 74–99)
GLUCOSE BLD MANUAL STRIP-MCNC: 131 MG/DL (ref 74–99)
GLUCOSE BLD MANUAL STRIP-MCNC: 149 MG/DL (ref 74–99)
GLUCOSE BLD MANUAL STRIP-MCNC: 196 MG/DL (ref 74–99)
GLUCOSE BLD MANUAL STRIP-MCNC: 234 MG/DL (ref 74–99)
GLUCOSE BLDV-MCNC: 69 MG/DL (ref 74–99)
GLUCOSE BLDV-MCNC: 82 MG/DL (ref 74–99)
GLUCOSE SERPL-MCNC: 107 MG/DL (ref 74–99)
GRAM STN SPEC: ABNORMAL
GRAM STN SPEC: ABNORMAL
HCO3 BLDV-SCNC: 21.1 MMOL/L (ref 22–26)
HCO3 BLDV-SCNC: 26 MMOL/L (ref 22–26)
HCT VFR BLD AUTO: 24.9 % (ref 41–52)
HCT VFR BLD AUTO: 29.7 % (ref 41–52)
HCT VFR BLD EST: 27 % (ref 41–52)
HCT VFR BLD EST: 30 % (ref 41–52)
HGB BLD-MCNC: 8.1 G/DL (ref 13.5–17.5)
HGB BLD-MCNC: 9.6 G/DL (ref 13.5–17.5)
HGB BLDV-MCNC: 10.1 G/DL (ref 13.5–17.5)
HGB BLDV-MCNC: 9.1 G/DL (ref 13.5–17.5)
HYPOCHROMIA BLD QL SMEAR: ABNORMAL
IMM GRANULOCYTES # BLD AUTO: 0.4 X10*3/UL (ref 0–0.7)
IMM GRANULOCYTES NFR BLD AUTO: 2.4 % (ref 0–0.9)
INHALED O2 CONCENTRATION: 21 %
INHALED O2 CONCENTRATION: 21 %
LACTATE BLDV-SCNC: 1.4 MMOL/L (ref 0.4–2)
LACTATE BLDV-SCNC: 4.8 MMOL/L (ref 0.4–2)
LYMPHOCYTES # BLD MANUAL: 1.59 X10*3/UL (ref 1.2–4.8)
LYMPHOCYTES NFR BLD MANUAL: 9.5 %
MAGNESIUM SERPL-MCNC: 2.08 MG/DL (ref 1.6–2.4)
MCH RBC QN AUTO: 29.8 PG (ref 26–34)
MCH RBC QN AUTO: 29.9 PG (ref 26–34)
MCHC RBC AUTO-ENTMCNC: 32.3 G/DL (ref 32–36)
MCHC RBC AUTO-ENTMCNC: 32.5 G/DL (ref 32–36)
MCV RBC AUTO: 92 FL (ref 80–100)
MCV RBC AUTO: 92 FL (ref 80–100)
MONOCYTES # BLD MANUAL: 0.15 X10*3/UL (ref 0.1–1)
MONOCYTES NFR BLD MANUAL: 0.9 %
MYELOCYTES # BLD MANUAL: 0.15 X10*3/UL
MYELOCYTES NFR BLD MANUAL: 0.9 %
NEUTS SEG # BLD MANUAL: 14.81 X10*3/UL (ref 1.2–7)
NEUTS SEG NFR BLD MANUAL: 88.7 %
NRBC BLD-RTO: 0.2 /100 WBCS (ref 0–0)
NRBC BLD-RTO: 0.4 /100 WBCS (ref 0–0)
OVALOCYTES BLD QL SMEAR: ABNORMAL
OXYHGB MFR BLDV: 40.5 % (ref 45–75)
OXYHGB MFR BLDV: 54.2 % (ref 45–75)
PCO2 BLDV: 42 MM HG (ref 41–51)
PCO2 BLDV: 42 MM HG (ref 41–51)
PH BLDV: 7.31 PH (ref 7.33–7.43)
PH BLDV: 7.4 PH (ref 7.33–7.43)
PHOSPHATE SERPL-MCNC: 4.2 MG/DL (ref 2.5–4.9)
PLATELET # BLD AUTO: 103 X10*3/UL (ref 150–450)
PLATELET # BLD AUTO: 79 X10*3/UL (ref 150–450)
PO2 BLDV: 31 MM HG (ref 35–45)
PO2 BLDV: 35 MM HG (ref 35–45)
POLYCHROMASIA BLD QL SMEAR: ABNORMAL
POTASSIUM BLDV-SCNC: 4.1 MMOL/L (ref 3.5–5.3)
POTASSIUM BLDV-SCNC: 5.3 MMOL/L (ref 3.5–5.3)
POTASSIUM SERPL-SCNC: 4.1 MMOL/L (ref 3.5–5.3)
PRODUCT BLOOD TYPE: 5100
PRODUCT BLOOD TYPE: 5100
PRODUCT CODE: NORMAL
PRODUCT CODE: NORMAL
PROT SERPL-MCNC: 5.5 G/DL (ref 6.4–8.2)
RBC # BLD AUTO: 2.71 X10*6/UL (ref 4.5–5.9)
RBC # BLD AUTO: 3.22 X10*6/UL (ref 4.5–5.9)
RBC MORPH BLD: ABNORMAL
SAO2 % BLDV: 41 % (ref 45–75)
SAO2 % BLDV: 55 % (ref 45–75)
SCHISTOCYTES BLD QL SMEAR: ABNORMAL
SODIUM BLDV-SCNC: 133 MMOL/L (ref 136–145)
SODIUM BLDV-SCNC: 134 MMOL/L (ref 136–145)
SODIUM SERPL-SCNC: 135 MMOL/L (ref 136–145)
TOTAL CELLS COUNTED BLD: 115
UNIT ABO: NORMAL
UNIT ABO: NORMAL
UNIT NUMBER: NORMAL
UNIT NUMBER: NORMAL
UNIT RH: NORMAL
UNIT RH: NORMAL
UNIT VOLUME: 350
UNIT VOLUME: 350
VANCOMYCIN SERPL-MCNC: 30.6 UG/ML (ref 5–20)
WBC # BLD AUTO: 16.7 X10*3/UL (ref 4.4–11.3)
WBC # BLD AUTO: 21.8 X10*3/UL (ref 4.4–11.3)
XM INTEP: NORMAL
XM INTEP: NORMAL

## 2024-07-30 PROCEDURE — 85027 COMPLETE CBC AUTOMATED: CPT

## 2024-07-30 PROCEDURE — 2500000004 HC RX 250 GENERAL PHARMACY W/ HCPCS (ALT 636 FOR OP/ED)

## 2024-07-30 PROCEDURE — 99291 CRITICAL CARE FIRST HOUR: CPT

## 2024-07-30 PROCEDURE — 2020000001 HC ICU ROOM DAILY

## 2024-07-30 PROCEDURE — 2500000002 HC RX 250 W HCPCS SELF ADMINISTERED DRUGS (ALT 637 FOR MEDICARE OP, ALT 636 FOR OP/ED)

## 2024-07-30 PROCEDURE — 84132 ASSAY OF SERUM POTASSIUM: CPT

## 2024-07-30 PROCEDURE — 83605 ASSAY OF LACTIC ACID: CPT

## 2024-07-30 PROCEDURE — 36415 COLL VENOUS BLD VENIPUNCTURE: CPT

## 2024-07-30 PROCEDURE — 99232 SBSQ HOSP IP/OBS MODERATE 35: CPT | Performed by: INTERNAL MEDICINE

## 2024-07-30 PROCEDURE — 83735 ASSAY OF MAGNESIUM: CPT

## 2024-07-30 PROCEDURE — 84100 ASSAY OF PHOSPHORUS: CPT

## 2024-07-30 PROCEDURE — 82947 ASSAY GLUCOSE BLOOD QUANT: CPT

## 2024-07-30 PROCEDURE — 80202 ASSAY OF VANCOMYCIN: CPT

## 2024-07-30 PROCEDURE — 80053 COMPREHEN METABOLIC PANEL: CPT

## 2024-07-30 PROCEDURE — 2500000001 HC RX 250 WO HCPCS SELF ADMINISTERED DRUGS (ALT 637 FOR MEDICARE OP)

## 2024-07-30 PROCEDURE — 71045 X-RAY EXAM CHEST 1 VIEW: CPT

## 2024-07-30 PROCEDURE — 2500000004 HC RX 250 GENERAL PHARMACY W/ HCPCS (ALT 636 FOR OP/ED): Mod: JZ

## 2024-07-30 PROCEDURE — 85007 BL SMEAR W/DIFF WBC COUNT: CPT

## 2024-07-30 RX ORDER — INSULIN GLARGINE 100 [IU]/ML
10 INJECTION, SOLUTION SUBCUTANEOUS NIGHTLY
Status: DISCONTINUED | OUTPATIENT
Start: 2024-07-30 | End: 2024-07-31

## 2024-07-30 ASSESSMENT — PAIN - FUNCTIONAL ASSESSMENT
PAIN_FUNCTIONAL_ASSESSMENT: 0-10

## 2024-07-30 ASSESSMENT — PAIN SCALES - GENERAL
PAINLEVEL_OUTOF10: 0 - NO PAIN

## 2024-07-30 NOTE — CARE PLAN
Problem: Skin  Goal: Decreased wound size/increased tissue granulation at next dressing change  Outcome: Progressing  Flowsheets (Taken 7/30/2024 1019)  Decreased wound size/increased tissue granulation at next dressing change: Protective dressings over bony prominences  Goal: Participates in plan/prevention/treatment measures  Outcome: Progressing  Goal: Prevent/manage excess moisture  Outcome: Progressing  Goal: Prevent/minimize sheer/friction injuries  Outcome: Progressing  Flowsheets (Taken 7/30/2024 1019)  Prevent/minimize sheer/friction injuries:   Turn/reposition every 2 hours/use positioning/transfer devices   HOB 30 degrees or less  Goal: Promote/optimize nutrition  Outcome: Progressing  Goal: Promote skin healing  Outcome: Progressing   The patient's goals for the shift include      The clinical goals for the shift include pt will maintain MAP above 60

## 2024-07-30 NOTE — BRIEF OP NOTE
Date: 2024  OR Location: [facility] OR    Name: Edwar Hemphill, : 1985, Age: 39 y.o., MRN: [id number], Sex: male    Diagnosis  Pre-op Diagnosis      * Septic shock (Multi) [A41.9, R65.21]     * Staphylococcal arthritis of left wrist (Multi) [M00.032]     * Infection of left wrist (Multi) [M00.9] Post-op Diagnosis     * Septic shock (Multi) [A41.9, R65.21]     * Staphylococcal arthritis of left wrist (Multi) [M00.032]     * Infection of left wrist (Multi) [M00.9]     Procedures  Debridement Wrist  68398 - [place] ARTHRS WRST EXC&/RPR TRIANG FIBROCART&/JOINT      Surgeons      * Will B Beucler - Primary    Resident/Fellow/Other Assistant:  Surgeons and Role:     * Maria Eugenia Vila MD - Resident - Assisting    Procedure Summary  Anesthesia: General  ASA: IV  Anesthesia Staff: Anesthesiologist: Shana Vital MD  Anesthesia Resident: Bright Gurrola DO  Estimated Blood Loss: 20mL  Intra-op Medications:   Administrations occurring from 2100 to 2245 on 24:   Medication Name Total Dose   vancomycin (Vancocin) vial for injection 1 g   atorvastatin (Lipitor) tablet 40 mg Cannot be calculated   B complex-vitamin C-folic acid (Nephrocaps) capsule 1 capsule Cannot be calculated   ceftaroline (Teflaro) 400 mg in dextrose 5% 50 mL IV Cannot be calculated   cholecalciferol (Vitamin D-3) capsule 50,000 Units Cannot be calculated   dextrose 50 % injection 12.5 g Cannot be calculated   dextrose 50 % injection 25 g Cannot be calculated   diphenhydrAMINE (BENADryl) capsule 25 mg Cannot be calculated   epoetin tyson (Epogen,Procrit) injection 10,000 Units Cannot be calculated   fentaNYL PF (Sublimaze) injection 75 mcg   folic acid (Folvite) tablet 1 mg Cannot be calculated   glucagon (Glucagen) injection 1 mg Cannot be calculated   glucagon (Glucagen) injection 1 mg Cannot be calculated   heparin 1,000 unit/mL injection 1,000 Units Cannot be calculated   heparin 1,000 unit/mL injection 1,000 Units Cannot be calculated    insulin glargine (Lantus) injection 5 Units Cannot be calculated   insulin lispro (HumaLOG) injection 0-10 Units Cannot be calculated   levothyroxine (Synthroid, Levoxyl) tablet 125 mcg Cannot be calculated   lidocaine (LMX) 4 % cream Cannot be calculated   melatonin tablet 6 mg Cannot be calculated   midazolam (Versed) injection 2 mg   midodrine (Proamatine) tablet 10 mg Cannot be calculated   norepinephrine (Levophed) 8 mg in dextrose 5% 250 mL (0.032 mg/mL) infusion (premix) 0.03 mg   nystatin (Mycostatin) 100,000 unit/gram powder 1 Application Cannot be calculated   pantoprazole (ProtoNix) EC tablet 40 mg Cannot be calculated   thiamine (Vitamin B-1) tablet 200 mg Cannot be calculated   vancomycin (Vancocin) pharmacy to dose - pharmacy monitoring Cannot be calculated              Anesthesia Record               Intraprocedure I/O Totals          Intake    LR bolus 500.00 mL    Norepinephrine Drip 0.00 mL    The total shown is the total volume documented since Anesthesia Start was filed.    Total Intake 500 mL          Specimen:   ID Type Source Tests Collected by Time   A : left wrist wound Swab ABSCESS FUNGAL CULTURE/SMEAR, TISSUE/WOUND CULTURE/SMEAR Will B Beucler, DO 7/29/2024 2242   B : left wrist wound Swab ABSCESS FUNGAL CULTURE/SMEAR, TISSUE/WOUND CULTURE/SMEAR Will B Beucler, DO 7/29/2024 2245        Staff:   Circulator: Noemí Gallego Person: Bridger          Findings: left wrist septic arthritis    Complications:  None; patient tolerated the procedure well.     Disposition: ICU - extubated and stable.  Condition: stable  Specimens Collected:   ID Type Source Tests Collected by Time   A : left wrist wound Swab ABSCESS FUNGAL CULTURE/SMEAR, TISSUE/WOUND CULTURE/SMEAR Will B Beucler, DO 7/29/2024 2242   B : left wrist wound Swab ABSCESS FUNGAL CULTURE/SMEAR, TISSUE/WOUND CULTURE/SMEAR Will B Beucler, DO 7/29/2024 2245     Attending Attestation: I was present and scrubbed for the entire  procedure.    Will B Beucler  Phone Number: [phone]

## 2024-07-30 NOTE — PROGRESS NOTES
Physical Therapy                 Therapy Communication Note    Patient Name: Edwar Hemphill  MRN: [id number]  Today's Date: 7/30/2024     Discipline: Physical Therapy    Missed Visit Reason: Missed Visit Reason:  (patient declined at this time, PT will re-attempt as time and schedule allows and as medically appropriate.)    Missed Time: Attempt    Comment:

## 2024-07-30 NOTE — ANESTHESIA POSTPROCEDURE EVALUATION
Patient: Edwar Hemphill    Procedure Summary       Date: 07/29/24 Room / Location: [facility] OR 06 / [facility] OR    Anesthesia Start: 2146 Anesthesia Stop: 2315    Procedure: Debridement Wrist (Left: Wrist) Diagnosis:       Septic shock (Multi)      Staphylococcal arthritis of left wrist (Multi)      Infection of left wrist (Multi)      (Septic shock (Multi) [A41.9, R65.21])      (Staphylococcal arthritis of left wrist (Multi) [M00.032])      (Infection of left wrist (Multi) [M00.9])    Surgeons: Rodolfo Allen DO Responsible Provider: Shana Vital MD    Anesthesia Type: general ASA Status: 4            Anesthesia Type: general    Vitals Value Taken Time   /68 07/29/24 2315   Temp 36.4 07/29/24 2315   Pulse 91 07/29/24 2315   Resp 16 07/29/24 2315   SpO2 100 07/29/24 2315       Anesthesia Post Evaluation    Patient location during evaluation: ICU  Patient participation: complete - patient participated  Level of consciousness: awake and alert  Pain score: 0  Pain management: adequate  Airway patency: patent  Cardiovascular status: stable  Respiratory status: spontaneous ventilation  Hydration status: acceptable  Postoperative Nausea and Vomiting: none        No notable events documented.

## 2024-07-30 NOTE — PROGRESS NOTES
Vancomycin Dosing by Pharmacy- FOLLOW UP    Edwar Hemphill is a 39 y.o. year old male who Pharmacy has been consulted for vancomycin dosing for other bacteremia . Based on the patient's indication and renal status this patient is being dosed based on a goal trough/random level of 15-20.     Renal function is currently unstable (CVVH stopped - renal plan unknown).    Current vancomycin dose: Dosing by levels    Most recent random level: 30.6 mcg/mL    Visit Vitals  BP 94/62   Pulse 81   Temp 35.6 °C (96.1 °F)   Resp 12        Lab Results   Component Value Date    CREATININE 2.39 (H) 2024    CREATININE 2.23 (H) 2024    CREATININE 1.69 (H) 2024    CREATININE 1.65 (H) 2024        Patient weight is as follows:   Vitals:    24 0500   Weight: 75.4 kg (166 lb 3.6 oz)       Cultures:  Susceptibility data for the encounter in last 14 days.  Collected Specimen Info Organism Clindamycin Erythromycin Oxacillin Rifampin Tetracycline Trimethoprim/Sulfamethoxazole Vancomycin   24 Fluid from Synovial Methicillin Resistant Staphylococcus aureus (MRSA)  R  R  R   R  S  S   24 Blood culture from Peripheral Venipuncture Staphylococcus haemolyticus  R  R  R   S  R  S   24 Blood culture from Peripheral Venipuncture Staphylococcus aureus          24 Blood culture from Peripheral Venipuncture Staphylococcus aureus          24 Blood culture from Peripheral Venipuncture Methicillin Resistant Staphylococcus aureus (MRSA)  R  R  R  I  R  S  S   24 Blood culture from Peripheral Venipuncture Staphylococcus aureus               I/O last 3 completed shifts:  In: 1739.3 (24.5 mL/kg) [I.V.:33.3 (0.5 mL/kg); Blood:356; IV Piggyback:1350]  Out: 203 (2.9 mL/kg) [Other:198; Blood:5]  Dosing Weight: 70.9 kg   I/O during current shift:  No intake/output data recorded.    Temp (24hrs), Av.1 °C (96.9 °F), Min:35.6 °C (96.1 °F), Max:36.5 °C (97.7 °F)      Assessment/Plan    Above goal level  of 15-20. Will hold dose today.  The next level will be obtained on 7/31 at AM labs. May be obtained sooner if clinically indicated.   Will continue to monitor renal function daily while on vancomycin and order serum creatinine at least every 48 hours if not already ordered.  Follow for continued vancomycin needs, clinical response, and signs/symptoms of toxicity.       Neftaly Sutton, PharmD

## 2024-07-30 NOTE — PROGRESS NOTES
Edwar Hemphill   39 y.o.    @WT@  MRN/Room: [id number]/24/24-A    Subjective: No acute events overnight. CVVH not resumed after procedure yesterday.     Objective:     Meds:   atorvastatin, 40 mg, Nightly  B complex-vitamin C-folic acid, 1 capsule, Daily  ceftaroline, 400 mg, q8h  cholecalciferol, 50,000 Units, Once per day on Monday Thursday  epoetin tyson or biosimilar, 10,000 Units, Once per day on Monday Wednesday Friday  folic acid, 1 mg, Daily  insulin glargine, 10 Units, Nightly  insulin lispro, 0-10 Units, TID  levothyroxine, 125 mcg, Daily before breakfast  melatonin, 6 mg, Nightly  midodrine, 10 mg, q8h  nystatin, 1 Application, BID  pantoprazole, 40 mg, Daily before breakfast  thiamine, 200 mg, Daily      norepinephrine, Last Rate: 0.04 mcg/kg/min (07/30/24 1317)      acetaminophen, 650 mg, q4h PRN   Or  acetaminophen, 650 mg, q4h PRN   Or  acetaminophen, 650 mg, q4h PRN  dextrose, 12.5 g, q15 min PRN  dextrose, 25 g, q15 min PRN  diphenhydrAMINE, 25 mg, q6h PRN  glucagon, 1 mg, q15 min PRN  glucagon, 1 mg, q15 min PRN  heparin, 1,000 Units, PRN  heparin, 1,000 Units, PRN  lidocaine, , 4x daily PRN  vancomycin, , Daily PRN        Vitals:    07/30/24 1300   BP: 113/67   Pulse: 88   Resp: 16   Temp:    SpO2:           Intake/Output Summary (Last 24 hours) at 7/30/2024 1345  Last data filed at 7/30/2024 1300  Gross per 24 hour   Intake 720.09 ml   Output 5 ml   Net 715.09 ml       General appearance: no distress  Heart: RRR  Lungs: CTA bilat   Abdomen: soft, nt/nd  Extremities: no edema bilat  Neuro: No FND  Access: femoral TDC     Blood Labs:  Results for orders placed or performed during the hospital encounter of 07/22/24 (from the past 24 hour(s))   POCT GLUCOSE   Result Value Ref Range    POCT Glucose 112 (H) 74 - 99 mg/dL   POCT GLUCOSE   Result Value Ref Range    POCT Glucose 87 74 - 99 mg/dL   POCT GLUCOSE   Result Value Ref Range    POCT Glucose 87 74 - 99 mg/dL   Prepare RBC: 2 Units, Leukocytes Reduced  (CMV reduced risk)   Result Value Ref Range    PRODUCT CODE T1998V86     Unit Number [id number]     Unit ABO O     Unit RH POS     XM INTEP COMP     Dispense Status XM     Blood Expiration Date 8/28/2024 11:59:00 PM EDT     PRODUCT BLOOD TYPE 5100     UNIT VOLUME 350     PRODUCT CODE W8444L63     Unit Number [id number]     Unit ABO O     Unit RH POS     XM INTEP COMP     Dispense Status XM     Blood Expiration Date 8/28/2024 11:59:00 PM EDT     PRODUCT BLOOD TYPE 5100     UNIT VOLUME 350    Blood Gas Venous Full Panel   Result Value Ref Range    POCT pH, Venous 7.40 7.33 - 7.43 pH    POCT pCO2, Venous 42 41 - 51 mm Hg    POCT pO2, Venous 35 35 - 45 mm Hg    POCT SO2, Venous 55 45 - 75 %    POCT Oxy Hemoglobin, Venous 54.2 45.0 - 75.0 %    POCT Hematocrit Calculated, Venous 27.0 (L) 41.0 - 52.0 %    POCT Sodium, Venous 134 (L) 136 - 145 mmol/L    POCT Potassium, Venous 4.1 3.5 - 5.3 mmol/L    POCT Chloride, Venous      POCT Ionized Calicum, Venous 1.23 1.10 - 1.33 mmol/L    POCT Glucose, Venous 82 74 - 99 mg/dL    POCT Lactate, Venous 1.4 0.4 - 2.0 mmol/L    POCT Base Excess, Venous 1.0 -2.0 - 3.0 mmol/L    POCT HCO3 Calculated, Venous 26.0 22.0 - 26.0 mmol/L    POCT Hemoglobin, Venous 9.1 (L) 13.5 - 17.5 g/dL    POCT Anion Gap, Venous      Patient Temperature 37.0 degrees Celsius    FiO2 21 %   CBC   Result Value Ref Range    WBC 17.2 (H) 4.4 - 11.3 x10*3/uL    nRBC 0.5 (H) 0.0 - 0.0 /100 WBCs    RBC 2.87 (L) 4.50 - 5.90 x10*6/uL    Hemoglobin 8.4 (L) 13.5 - 17.5 g/dL    Hematocrit 26.3 (L) 41.0 - 52.0 %    MCV 92 80 - 100 fL    MCH 29.3 26.0 - 34.0 pg    MCHC 31.9 (L) 32.0 - 36.0 g/dL    RDW 21.9 (H) 11.5 - 14.5 %    Platelets 84 (L) 150 - 450 x10*3/uL   Renal function panel   Result Value Ref Range    Glucose 73 (L) 74 - 99 mg/dL    Sodium 136 136 - 145 mmol/L    Potassium 3.9 3.5 - 5.3 mmol/L    Chloride 103 98 - 107 mmol/L    Bicarbonate 24 21 - 32 mmol/L    Anion Gap 13 mmol/L    Urea Nitrogen 16 6 -  23 mg/dL    Creatinine 2.23 (H) 0.50 - 1.30 mg/dL    eGFR 37 (L) >60 mL/min/1.73m*2    Calcium 8.3 (L) 8.6 - 10.6 mg/dL    Phosphorus 3.5 2.5 - 4.9 mg/dL    Albumin 2.5 (L) 3.4 - 5.0 g/dL   Fungal Culture/Smear    Specimen: ABSCESS; Swab   Result Value Ref Range    Fungal Culture/Smear       Culture in progress, a report will be issued when positive or after 2 weeks of incubation.    Fungal Smear No fungal elements seen    Fungal Culture/Smear    Specimen: ABSCESS; Swab   Result Value Ref Range    Fungal Culture/Smear       Culture in progress, a report will be issued when positive or after 2 weeks of incubation.    Fungal Smear No fungal elements seen    CBC and Auto Differential   Result Value Ref Range    WBC 16.7 (H) 4.4 - 11.3 x10*3/uL    nRBC 0.4 (H) 0.0 - 0.0 /100 WBCs    RBC 2.71 (L) 4.50 - 5.90 x10*6/uL    Hemoglobin 8.1 (L) 13.5 - 17.5 g/dL    Hematocrit 24.9 (L) 41.0 - 52.0 %    MCV 92 80 - 100 fL    MCH 29.9 26.0 - 34.0 pg    MCHC 32.5 32.0 - 36.0 g/dL    RDW 22.1 (H) 11.5 - 14.5 %    Platelets 79 (L) 150 - 450 x10*3/uL    Immature Granulocytes %, Automated 2.4 (H) 0.0 - 0.9 %    Immature Granulocytes Absolute, Automated 0.40 0.00 - 0.70 x10*3/uL   Comprehensive Metabolic Panel   Result Value Ref Range    Glucose 107 (H) 74 - 99 mg/dL    Sodium 135 (L) 136 - 145 mmol/L    Potassium 4.1 3.5 - 5.3 mmol/L    Chloride 101 98 - 107 mmol/L    Bicarbonate 27 21 - 32 mmol/L    Anion Gap 11 10 - 20 mmol/L    Urea Nitrogen 17 6 - 23 mg/dL    Creatinine 2.39 (H) 0.50 - 1.30 mg/dL    eGFR 34 (L) >60 mL/min/1.73m*2    Calcium 8.3 (L) 8.6 - 10.6 mg/dL    Albumin 2.5 (L) 3.4 - 5.0 g/dL    Alkaline Phosphatase 191 (H) 33 - 120 U/L    Total Protein 5.5 (L) 6.4 - 8.2 g/dL    AST 21 9 - 39 U/L    Bilirubin, Total 0.8 0.0 - 1.2 mg/dL    ALT 35 10 - 52 U/L   Magnesium   Result Value Ref Range    Magnesium 2.08 1.60 - 2.40 mg/dL   Phosphorus   Result Value Ref Range    Phosphorus 4.2 2.5 - 4.9 mg/dL   Vancomycin   Result  Value Ref Range    Vancomycin 30.6 (H) 5.0 - 20.0 ug/mL   Manual Differential   Result Value Ref Range    Neutrophils %, Manual 88.7 40.0 - 80.0 %    Lymphocytes %, Manual 9.5 13.0 - 44.0 %    Monocytes %, Manual 0.9 2.0 - 10.0 %    Eosinophils %, Manual 0.0 0.0 - 6.0 %    Basophils %, Manual 0.0 0.0 - 2.0 %    Myelocytes %, Manual 0.9 0.0 - 0.0 %    Seg Neutrophils Absolute, Manual 14.81 (H) 1.20 - 7.00 x10*3/uL    Lymphocytes Absolute, Manual 1.59 1.20 - 4.80 x10*3/uL    Monocytes Absolute, Manual 0.15 0.10 - 1.00 x10*3/uL    Eosinophils Absolute, Manual 0.00 0.00 - 0.70 x10*3/uL    Basophils Absolute, Manual 0.00 0.00 - 0.10 x10*3/uL    Myelocytes Absolute, Manual 0.15 0.00 - 0.00 x10*3/uL    Total Cells Counted 115     RBC Morphology See Below     Polychromasia Mild     Hypochromia Mild     RBC Fragments Few     Ovalocytes Few    POCT GLUCOSE   Result Value Ref Range    POCT Glucose 131 (H) 74 - 99 mg/dL   POCT GLUCOSE   Result Value Ref Range    POCT Glucose 121 (H) 74 - 99 mg/dL          Edwar Hemphill is a  39 y.o.  Year old , with PMHx of ESRD on HD (MWF, right femoral line), HFrEF (EF 35-40% 5/2024), T2DM, PVD (s/p left AKA and right forearm amputation), multiple line-related DVT (RIJ, LIJ, RUE, LUE, RLE, LLE; on Eliquis prior to recent upper GI bleed 6/15) presenting from [facility] due to hypotension prior to dialysis. Admitted to MICU for septic shock 2/2 mrsa bacteremia. Nephrology consulted for dialysis needs.     Updates:  -Vegectomy aborted yesterday due to concerns for infected thrombus in IVC and potential incomplete treatment without removal of TDC  -CVVH not resumed last night     #ESRD on HD MWF via R femoral TDC  #Hx of multiple HD catheter infections, mrsa bacteremia   -HD at [facility], nephrologist Dr. Licona - last HD 7/19  Started on CVVH 7/23 due to hemodynamic instability.   -hemodynamics: off levophed     #Septic shock 2/2 staph aureus bacteremia  #TV infective endocarditis    -vancomycin, ceftaroline   -Bcx 7/25 staph, 7/27 - staph heamolyticus. Bcx 7/29 pending      #MBD   - Renelva discontinued on 07/24     #Anemia   - epo           RECOMMENDATIONS:  -Hemodynamics stable on midodrine. No indication to resume CVVH today so will plan for IHD vs sled tomorrow based on hemodynamics   -Pending further discussion with IR to assess TDC removal and location of next catheter placement   - Supportive treatment as per ICU team.      Mary Hayes DO  Nephrology Fellow   Daytime / Weekend Renal Pager [phone]  After 7 pm Emergencies [phone] Pager [phone]

## 2024-07-30 NOTE — CARE PLAN
The patient's goals for the shift include none     The clinical goals for the shift include pt will not need increase in pressors during shift    Over the shift, the patient did not make progress toward the following goals. Barriers to progression include motivation. Recommendations to address these barriers include   Problem: Safety - Adult  Goal: Free from fall injury  Outcome: Progressing     Problem: Skin  Goal: Decreased wound size/increased tissue granulation at next dressing change  Outcome: Progressing  Flowsheets (Taken 7/30/2024 0646)  Decreased wound size/increased tissue granulation at next dressing change: Protective dressings over bony prominences  Goal: Participates in plan/prevention/treatment measures  Outcome: Progressing  Flowsheets (Taken 7/30/2024 0646)  Participates in plan/prevention/treatment measures: Elevate heels  Goal: Prevent/manage excess moisture  Outcome: Progressing  Flowsheets (Taken 7/30/2024 0646)  Prevent/manage excess moisture:   Monitor for/manage infection if present   Cleanse incontinence/protect with barrier cream  Goal: Prevent/minimize sheer/friction injuries  Outcome: Progressing  Flowsheets (Taken 7/30/2024 0646)  Prevent/minimize sheer/friction injuries: Turn/reposition every 2 hours/use positioning/transfer devices  Goal: Promote/optimize nutrition  Outcome: Progressing  Flowsheets (Taken 7/30/2024 0646)  Promote/optimize nutrition: Monitor/record intake including meals  Goal: Promote skin healing  Outcome: Progressing  Flowsheets (Taken 7/30/2024 0646)  Promote skin healing:   Turn/reposition every 2 hours/use positioning/transfer devices   Protective dressings over bony prominences   Assess skin/pad under line(s)/device(s)   .

## 2024-07-30 NOTE — ANESTHESIA PREPROCEDURE EVALUATION
Patient: Edwar Hemphill    Procedure Information       Date/Time: 07/29/24 2100    Procedure: Debridement Wrist (Left: Wrist) - Left wrist irrigation & debridement for septic arthritis    Location: [facility] OR 06 / [facility] OR    Surgeons: Will B Beucler, DO            Relevant Problems   Cardiac  Echo on 7/23 showed an EF of 40% with moderate RV dysfunction, severe TR unable to do a vagectomy today with cards   Cleared by cards for procedure and hand debridement   On 0.01 of levo currently    (+) HLD (hyperlipidemia)   (+) Hypertension   (+) PVD (peripheral vascular disease) (CMS-HCC)   (+) Tricuspid valve vegetation (HHS-HCC)      Pulmonary  Currently on 2L   (+) Pneumonia of both lungs due to infectious organism, unspecified part of lung      GI   (+) GERD (gastroesophageal reflux disease)      /Renal  Currently on CVVH   (+) ESRD (end stage renal disease) on dialysis (Multi)      Endocrine   (+) Hypothyroidism   (+) Type 1 diabetes mellitus (Multi)      ID  Current endocarditis, ID on board and following. Possible line infection but unable to remove due to poor vascular access   admitted  with septic shock required pressors and infective endocarditis with large TV vegetation, w/ bcx growing GPCs with staph aureus ( with history MRSA bacteremia before). Started on vanc/zosyn, fluid resuscitated, ID, cardiology and vascular medicine consulted.    (+) Acute infective endocarditis (HHS-HCC)   (+) Pneumonia of both lungs due to infectious organism, unspecified part of lung   (+) Septic shock (Multi)   (+) Staphylococcal arthritis of left wrist (Multi)       Clinical information reviewed:    Allergies  Meds               NPO Detail:  No data recorded     Physical Exam    Airway  Mallampati: III  TM distance: >3 FB  Neck ROM: full     Cardiovascular - normal exam     Dental    Pulmonary - normal exam     Abdominal - normal exam         Anesthesia Plan    History of general anesthesia?: yes  History of  complications of general anesthesia?: no    ASA 4     MAC   (I have explained to the patient and his mother risks associated with general anesthesia, including his cardiac risk and risk of heart failure with his recent echocardiogram findings. Due to urgency of procedure, and after discussion with cardiology, MICU and orthopedics surgeon, decision was made to proceed with surgery under MAC to avoid any hemodynamic compromise. I have explained to the patient that there is always the risk that we may need to convert to general and the associated risks with GA. Patient is okay with proceeding. )  The patient is not a current smoker.  Patient was not previously instructed to abstain from smoking on day of procedure.  Patient did not smoke on day of procedure.    intravenous induction   Postoperative administration of opioids is intended.  Anesthetic plan and risks discussed with patient.  Use of blood products discussed with patient who consented to blood products.    Plan discussed with attending.

## 2024-07-30 NOTE — PROGRESS NOTES
SOCIAL WORK NOTE   SW met with team for interdisciplinary rounds.    -ICU Treatment Plan: Patient in septic shock, requiring pressors, HD line is infected  -Support System: Mother, POA in chart    -Planned Disposition: Return to [facility]  -Additional information: Updates attached in Careport   - Barriers to discharge:  none at this time

## 2024-07-30 NOTE — PROGRESS NOTES
"Medical Intensive Care - Daily Progress Note   Subjective    Edwar Hemphill is a 39 y.o. year old male patient admitted on 7/22/2024 with following ICU needs: septic shock required pressors, infective endocarditis     Interval History:  Patient seen and examined at bedside has no acute complaints at this time. HDS and off levo. On room air with normal saturation. He denies any new or worsening symptoms.         Meds    Scheduled medications  atorvastatin, 40 mg, oral, Nightly  B complex-vitamin C-folic acid, 1 capsule, oral, Daily  ceftaroline, 400 mg, intravenous, q8h  cholecalciferol, 50,000 Units, oral, Once per day on Monday Thursday  epoetin tyson or biosimilar, 10,000 Units, subcutaneous, Once per day on Monday Wednesday Friday  folic acid, 1 mg, oral, Daily  insulin glargine, 10 Units, subcutaneous, Nightly  insulin lispro, 0-10 Units, subcutaneous, TID  levothyroxine, 125 mcg, oral, Daily before breakfast  melatonin, 6 mg, oral, Nightly  midodrine, 10 mg, oral, q8h  nystatin, 1 Application, Topical, BID  pantoprazole, 40 mg, oral, Daily before breakfast  thiamine, 200 mg, oral, Daily      Continuous medications  norepinephrine, 0-0.5 mcg/kg/min (Dosing Weight), Last Rate: 0.01 mcg/kg/min (07/30/24 1118)      PRN medications  PRN medications: acetaminophen **OR** acetaminophen **OR** acetaminophen, dextrose, dextrose, diphenhydrAMINE, glucagon, glucagon, heparin, heparin, lidocaine, vancomycin     Objective    Blood pressure 123/79, pulse 79, temperature 35.2 °C (95.4 °F), temperature source Temporal, resp. rate 12, height 1.753 m (5' 9.02\"), weight 75.4 kg (166 lb 3.6 oz), SpO2 95%.     Physical Exam   Constitutional:       Appearance: Normal appearance.   HENT:      Mouth/Throat:      Mouth: Mucous membranes are moist.   Cardiovascular:      Rate and Rhythm: Regular rate. Diastolic murmur on LSB and pulmonic area      Heart sounds: Normal heart sounds.   Pulmonary:      Effort: Pulmonary effort is normal.      " Breath sounds: Normal breath sounds.   Abdominal:      Palpations: Abdomen is soft.   Musculoskeletal:      Comments: Left foot amputated above the knee  Right hand amputated above elbow  Posterior calcaneous wound has yellow discharge   Skin:     General: Skin is dry.   Neurological:      General: No focal deficit present.      Mental Status: He is oriented to person, place, and time.   Psychiatric:         Behavior: Behavior normal.         Thought Content: Thought content normal.         Judgment: Judgment normal.        Intake/Output Summary (Last 24 hours) at 7/30/2024 1203  Last data filed at 7/30/2024 1200  Gross per 24 hour   Intake 1071.09 ml   Output 5 ml   Net 1066.09 ml     Labs:   Results from last 72 hours   Lab Units 07/30/24  0528 07/29/24  2101 07/29/24  0302   SODIUM mmol/L 135* 136 136   POTASSIUM mmol/L 4.1 3.9 4.1   CHLORIDE mmol/L 101 103 101   CO2 mmol/L 27 24 26   BUN mg/dL 17 16 11   CREATININE mg/dL 2.39* 2.23* 1.69*   GLUCOSE mg/dL 107* 73* 264*   CALCIUM mg/dL 8.3* 8.3* 7.8*   ANION GAP mmol/L 11 13 13   EGFR mL/min/1.73m*2 34* 37* 52*   PHOSPHORUS mg/dL 4.2 3.5 2.3*      Results from last 72 hours   Lab Units 07/30/24  0528 07/29/24  2056 07/29/24  0102 07/28/24  0517   WBC AUTO x10*3/uL 16.7* 17.2* 11.1 14.8*   HEMOGLOBIN g/dL 8.1* 8.4* 6.6* 7.7*   HEMATOCRIT % 24.9* 26.3* 20.0* 24.2*   PLATELETS AUTO x10*3/uL 79* 84* 44* 57*   NEUTROS PCT AUTO %  --   --  82.4  --    LYMPHO PCT MAN % 9.5  --   --  3.4   LYMPHS PCT AUTO %  --   --  10.8  --    MONO PCT MAN % 0.9  --   --  0.8   MONOS PCT AUTO %  --   --  3.7  --    EOSINO PCT MAN % 0.0  --   --  0.0   EOS PCT AUTO %  --   --  0.2  --         Micro/ID:     Lab Results   Component Value Date    BLOODCULT No growth at 1 day 07/29/2024    BLOODCULT No growth at 1 day 07/29/2024         Assessment and Plan     Assessment:  Edwar Hemphill is a 39 y.o. male with PMHx significant for ESRD (MWF  via right femoral line), NICM,  HFrEF (LVEF 35-40%  on 5/2024 POLI), anemia, DM2, Left AKA, RUE amputation, HTN, PVD, GERD.  Admitted  with septic shock required pressors and infective endocarditis with large TV vegetation, w/ bcx growing GPCs with staph aureus ( with history MRSA bacteremia before). Started on vanc/zosyn, fluid resuscitated, ID, cardiology and vascular medicine consulted. On TTE showed TV vegetation and possible source could be femoral catheter with attached vegetations, continued on CVVH based on nephrology consult. Changed zosyn to ceftaroline on 7/23. We were not able to take off the line as a source of infection given his DVTs in subclavian, LIJ, b/l innominate occlusion and complete occlusion of intrahepatic IVC its hard to find another access at this time. IR, cardiology, ID, Vascular medicine are on board. he also had  Acinetobacter baumanni positive on his wound culture. POLI has done 7/24/24 showed large vegetation on the catheter measures at least 1.9cm x 2.5 cm with  involvement of the TV with likely destruction of the septal leaflet of the TV and resultant severe TR.  He was previously on CVVH but will start HD 7/31/24. CTA showed Severely attenuated superior vena cava, bilateral brachiocephalic, subclavian and internal jugular veins with the calcifications in the brachiocephalic veins with extensive collaterals in the chest wall likely representing chronic thrombosis. Patient had vegectomy aborted due to to concerns for infected thrombus in the IVC and potential incomplete treatment without removal of the TDC.  Recent L wrist synovial fluid was positive for MRSA he is now s/p washout currently on Vancomycin and Ceftaroline.     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   -positive staph aureus bacteremia   -positive second blood culture with Gram positive cocci, clusters, on 7/25  - Ceftaroline started on 7/23 and Vancomycin started on 7/22   -palliative care consulted and patient said he wants to receive treatments  - 7/27 Blood cultures positive for Staph Haemolyticus  - Wrist synovial fluid culture positive for MRSA, now s/p L wrist debridement/washout 7/29  - Levo restarted at 0.01 7/29 with MAP goal >60     Plan:  - vegectomy aborted due to to concerns for infected thrombus in the IVC and potential incomplete treatment without removal of the TDC  - Continue levo MAP goal >60 (Currently 0.01)   - Discussed with ID c/w ceftaroline and vancomycin   - Awaiting recommendations from Dr. Dennis's Cardiology team   - F/U blood cultures collected 7/29            #HFrecEF   - last EF 39% (lowest EF 26%)   - Presumed ICM          # hx of paroxysmal Afib  #Multiple line-related DVTs   - CHADSVASC 6 (DM, PAD, TIA, HTN, HF)   - b/l subclavian DVT, L IJ DVT, b/l innominate occlusion, complete occlusion of intrahepatic IVC s/p angioplasty   - priorly on Eliquis however recent GI bleed 6/15 at [facility] , eliquis was held and patient was planned for heparin trial but left ama prior to trial   - not a candidate for IVC filter given femoral TDC   - last DVT US w/ resolution of RLE DVT   -discontinued heparin due to low plts   -PF4 with normal activity       Plan:  -hold on anticoagulation for now will monitor CBC's for platelet count and consider restarting           PULMONARY:  NAVI  CXR at bl     RENAL/GENITOURINARY:  #ESRD MWF thru right femoral line   -  iHD TTS since 2016, anuric   - RUE AVG c/b infection s/p RUE amputation above elbow 2021   - multiple TDCs (including iliolumbar)   - femoral TDC recently placed at  (14.5 [place] and  42 cm long)  - lokelma 5 MWF Held iso of hypokalemia  - home sevelmer held  Plan   -Discussed with nephrology patient to receive HD tomorrow   - IR will evaluate for alternate access          GASTROENTEROLOGY:  #Esophagitis   #GERD  ::EGD on 6/8 clipped two foci of active esophageal bleeding   Plan:  -cw home PPI BID        ENDOCRINOLOGY:  #hypothyroidism  -last TSH 1.9  Plan:  -cw home levo 125     #T1DM  -home regiment : 8u glargine, SSI  -Hba1c 7.3  Plan:   - Increased glargine to 10 u from home dose of 8u with SSI #2       HEMATOLOGY:  #Chronic anemia  #Anemia of chronic disease  > Hgb 6.6 7/29 transfused 1 unit --> 8.4 s/p tx--> 8/1 (7/30)    Plan:  - c/w home epo m/w/f  - trend cbc   - if Hb<7 transfuse      #Thrombocytopenia  -no concern for KALLIE now, possibly due to active infection   -PF4 activity normal  -Fibrinogen 201, INR 1.4, aPTT 34, PT 15  -plt down trending to 44 7/29 --> 84 s/p 1 unit platelets--> 79 (7/30)   Plan:  -FU on CBC         MUSCULOSKELETAL/ SKIN:  NAVI     INFECTIOUS DISEASE:  #Sepsis with line as Likely  source  #endocarditis   # Septic arthritis L wrist   >(7/27) Blood cultures positive for Staph Haemolyticus  >Wrist synovial fluid culture positive for MRSA, now s/p L wrist debridement/washout (7/29)  >has previously needed double coverage for 4 weeks with van/dapto to cover infection. But developed lung toxicity 2/2 dapto. And switched to linezolid.    Plan:  - Infectious Disease is following the patient   - Continue Ceftaroline & Vancomycin   -after find a second line can consider removing femoral line as a source of inf  -FU with ID             ICU Check List      FEN:  Fluids: restricted   Electrolytes: k>4 mg>2 caution as ESRD  Nutrition: renal diet  DVT ppx: none  GI ppx: PPI  Bowel care: Miralax prn  Access:  PIV, Femoral line        Social:  Code: Full Code    NOK:   Extended Emergency Contact Information  Primary Emergency Contact: Shanthi Hemphill  Mobile Phone:  [phone]  Relation: Parent  Secondary Emergency Contact: Terra Scott  Mobile Phone: [phone]       Don Hernández MD   07/30/24 at 12:03 PM   PGY-1 Internal Medicine Resident     Disclaimer: Documentation completed with the information available at the time of input. The times in the chart may not be reflective of actual patient care times, interventions, or procedures. Documentation occurs after the physical care of the patient.

## 2024-07-30 NOTE — SIGNIFICANT EVENT
39M w/ ESRD (MWF via femoral TDC), NICM/HFrEF (LVEF 35-40% on 05/24 POLI), prior MV IE (2020), T2D, RUE amputation, HTN, PUD who presented to the ED from dialysis on 7/23 for hypotension and was found to be in septic shock w/ MRSA bacteremia and was admitted to the MICU for pressor support. He was found to have vegetations associated with his femoral TDC with extension to the TV and destruction of the septal leaflet with resultant severe TR. Vegectomy was attempted today but aborted due to concerns for infected thrombus in the IVC and potential incomplete treatment without removal of the TDC.     RCRI 3 points for HF, hx of TIA, and pre-op insulin, 15.0 %  30-day risk of death, MI, or cardiac arrest. This risk is underestimated due to inability to perform 4 METS, active infection, he is at moderate to high risk for MACE taking into account the severe TR, EF 35% amongst multiple other risk factors.  and the procedure is under GA, but not prohibitive risk.     Risk explained to the patient and his mother at bedside.   Discussed with Dr. Mik Grissom.

## 2024-07-31 ENCOUNTER — APPOINTMENT (OUTPATIENT)
Dept: DIALYSIS | Facility: HOSPITAL | Age: 39
End: 2024-07-31
Payer: COMMERCIAL

## 2024-07-31 LAB
ABO GROUP (TYPE) IN BLOOD: NORMAL
ALBUMIN SERPL BCP-MCNC: 2.4 G/DL (ref 3.4–5)
ALBUMIN SERPL BCP-MCNC: 2.9 G/DL (ref 3.4–5)
ALP SERPL-CCNC: 189 U/L (ref 33–120)
ALT SERPL W P-5'-P-CCNC: 25 U/L (ref 10–52)
ANION GAP SERPL CALC-SCNC: 16 MMOL/L (ref 10–20)
ANION GAP SERPL CALC-SCNC: 19 MMOL/L (ref 10–20)
ANTIBODY SCREEN: NORMAL
APTT PPP: 31 SECONDS (ref 27–38)
AST SERPL W P-5'-P-CCNC: 16 U/L (ref 9–39)
BACTERIA BLD AEROBE CULT: ABNORMAL
BACTERIA BLD CULT: ABNORMAL
BASOPHILS # BLD AUTO: 0.06 X10*3/UL (ref 0–0.1)
BASOPHILS NFR BLD AUTO: 0.3 %
BILIRUB SERPL-MCNC: 0.9 MG/DL (ref 0–1.2)
BUN SERPL-MCNC: 22 MG/DL (ref 6–23)
BUN SERPL-MCNC: 24 MG/DL (ref 6–23)
BURR CELLS BLD QL SMEAR: NORMAL
CALCIUM SERPL-MCNC: 8.5 MG/DL (ref 8.6–10.6)
CALCIUM SERPL-MCNC: 9 MG/DL (ref 8.6–10.6)
CHLORIDE SERPL-SCNC: 100 MMOL/L (ref 98–107)
CHLORIDE SERPL-SCNC: 101 MMOL/L (ref 98–107)
CO2 SERPL-SCNC: 21 MMOL/L (ref 21–32)
CO2 SERPL-SCNC: 24 MMOL/L (ref 21–32)
CREAT SERPL-MCNC: 3.03 MG/DL (ref 0.5–1.3)
CREAT SERPL-MCNC: 3.15 MG/DL (ref 0.5–1.3)
EGFRCR SERPLBLD CKD-EPI 2021: 25 ML/MIN/1.73M*2
EGFRCR SERPLBLD CKD-EPI 2021: 26 ML/MIN/1.73M*2
EOSINOPHIL # BLD AUTO: 0.04 X10*3/UL (ref 0–0.7)
EOSINOPHIL NFR BLD AUTO: 0.2 %
ERYTHROCYTE [DISTWIDTH] IN BLOOD BY AUTOMATED COUNT: 22.5 % (ref 11.5–14.5)
GLUCOSE BLD MANUAL STRIP-MCNC: 168 MG/DL (ref 74–99)
GLUCOSE BLD MANUAL STRIP-MCNC: 182 MG/DL (ref 74–99)
GLUCOSE BLD MANUAL STRIP-MCNC: 237 MG/DL (ref 74–99)
GLUCOSE BLD MANUAL STRIP-MCNC: 35 MG/DL (ref 74–99)
GLUCOSE BLD MANUAL STRIP-MCNC: 36 MG/DL (ref 74–99)
GLUCOSE BLD MANUAL STRIP-MCNC: 37 MG/DL (ref 74–99)
GLUCOSE BLD MANUAL STRIP-MCNC: 48 MG/DL (ref 74–99)
GLUCOSE BLD MANUAL STRIP-MCNC: 66 MG/DL (ref 74–99)
GLUCOSE BLD MANUAL STRIP-MCNC: 84 MG/DL (ref 74–99)
GLUCOSE SERPL-MCNC: 32 MG/DL (ref 74–99)
GLUCOSE SERPL-MCNC: 66 MG/DL (ref 74–99)
GRAM STN SPEC: ABNORMAL
HBV SURFACE AB SER-ACNC: <3.1 MIU/ML
HBV SURFACE AG SERPL QL IA: NONREACTIVE
HCT VFR BLD AUTO: 26.7 % (ref 41–52)
HGB BLD-MCNC: 8.4 G/DL (ref 13.5–17.5)
HYPOCHROMIA BLD QL SMEAR: NORMAL
IMM GRANULOCYTES # BLD AUTO: 0.21 X10*3/UL (ref 0–0.7)
IMM GRANULOCYTES NFR BLD AUTO: 1 % (ref 0–0.9)
INR PPP: 1.3 (ref 0.9–1.1)
LACTATE BLDV-SCNC: 3.3 MMOL/L (ref 0.4–2)
LYMPHOCYTES # BLD AUTO: 1.68 X10*3/UL (ref 1.2–4.8)
LYMPHOCYTES NFR BLD AUTO: 8 %
MAGNESIUM SERPL-MCNC: 2.16 MG/DL (ref 1.6–2.4)
MCH RBC QN AUTO: 29.8 PG (ref 26–34)
MCHC RBC AUTO-ENTMCNC: 31.5 G/DL (ref 32–36)
MCV RBC AUTO: 95 FL (ref 80–100)
MONOCYTES # BLD AUTO: 0.96 X10*3/UL (ref 0.1–1)
MONOCYTES NFR BLD AUTO: 4.6 %
NEUTROPHILS # BLD AUTO: 18.06 X10*3/UL (ref 1.2–7.7)
NEUTROPHILS NFR BLD AUTO: 85.9 %
NRBC BLD-RTO: 0.1 /100 WBCS (ref 0–0)
PHOSPHATE SERPL-MCNC: 5.2 MG/DL (ref 2.5–4.9)
PHOSPHATE SERPL-MCNC: 5.2 MG/DL (ref 2.5–4.9)
PLATELET # BLD AUTO: 74 X10*3/UL (ref 150–450)
POLYCHROMASIA BLD QL SMEAR: NORMAL
POTASSIUM SERPL-SCNC: 4.7 MMOL/L (ref 3.5–5.3)
POTASSIUM SERPL-SCNC: 5 MMOL/L (ref 3.5–5.3)
PROT SERPL-MCNC: 5.6 G/DL (ref 6.4–8.2)
PROTHROMBIN TIME: 14.5 SECONDS (ref 9.8–12.8)
RBC # BLD AUTO: 2.82 X10*6/UL (ref 4.5–5.9)
RBC MORPH BLD: NORMAL
RH FACTOR (ANTIGEN D): NORMAL
SODIUM SERPL-SCNC: 135 MMOL/L (ref 136–145)
SODIUM SERPL-SCNC: 136 MMOL/L (ref 136–145)
UFH PPP CHRO-ACNC: <0.1 IU/ML
VANCOMYCIN SERPL-MCNC: 31.1 UG/ML (ref 5–20)
WBC # BLD AUTO: 21 X10*3/UL (ref 4.4–11.3)

## 2024-07-31 PROCEDURE — 86901 BLOOD TYPING SEROLOGIC RH(D): CPT

## 2024-07-31 PROCEDURE — 82533 TOTAL CORTISOL: CPT | Performed by: NURSE PRACTITIONER

## 2024-07-31 PROCEDURE — 80053 COMPREHEN METABOLIC PANEL: CPT

## 2024-07-31 PROCEDURE — 2500000002 HC RX 250 W HCPCS SELF ADMINISTERED DRUGS (ALT 637 FOR MEDICARE OP, ALT 636 FOR OP/ED)

## 2024-07-31 PROCEDURE — 80202 ASSAY OF VANCOMYCIN: CPT

## 2024-07-31 PROCEDURE — 5A1D70Z PERFORMANCE OF URINARY FILTRATION, INTERMITTENT, LESS THAN 6 HOURS PER DAY: ICD-10-PCS | Performed by: INTERNAL MEDICINE

## 2024-07-31 PROCEDURE — 2500000004 HC RX 250 GENERAL PHARMACY W/ HCPCS (ALT 636 FOR OP/ED)

## 2024-07-31 PROCEDURE — 82947 ASSAY GLUCOSE BLOOD QUANT: CPT

## 2024-07-31 PROCEDURE — 2500000001 HC RX 250 WO HCPCS SELF ADMINISTERED DRUGS (ALT 637 FOR MEDICARE OP)

## 2024-07-31 PROCEDURE — 2020000001 HC ICU ROOM DAILY

## 2024-07-31 PROCEDURE — 87340 HEPATITIS B SURFACE AG IA: CPT | Performed by: INTERNAL MEDICINE

## 2024-07-31 PROCEDURE — 2500000005 HC RX 250 GENERAL PHARMACY W/O HCPCS

## 2024-07-31 PROCEDURE — 83735 ASSAY OF MAGNESIUM: CPT

## 2024-07-31 PROCEDURE — 71045 X-RAY EXAM CHEST 1 VIEW: CPT | Performed by: RADIOLOGY

## 2024-07-31 PROCEDURE — 2500000004 HC RX 250 GENERAL PHARMACY W/ HCPCS (ALT 636 FOR OP/ED): Mod: JZ

## 2024-07-31 PROCEDURE — 87040 BLOOD CULTURE FOR BACTERIA: CPT

## 2024-07-31 PROCEDURE — 36415 COLL VENOUS BLD VENIPUNCTURE: CPT | Performed by: INTERNAL MEDICINE

## 2024-07-31 PROCEDURE — 6350000001 HC RX 635 EPOETIN >10,000 UNITS: Mod: JZ

## 2024-07-31 PROCEDURE — 84439 ASSAY OF FREE THYROXINE: CPT

## 2024-07-31 PROCEDURE — 85520 HEPARIN ASSAY: CPT

## 2024-07-31 PROCEDURE — 84100 ASSAY OF PHOSPHORUS: CPT

## 2024-07-31 PROCEDURE — 85610 PROTHROMBIN TIME: CPT

## 2024-07-31 PROCEDURE — 99291 CRITICAL CARE FIRST HOUR: CPT

## 2024-07-31 PROCEDURE — 84443 ASSAY THYROID STIM HORMONE: CPT

## 2024-07-31 PROCEDURE — 36415 COLL VENOUS BLD VENIPUNCTURE: CPT

## 2024-07-31 PROCEDURE — 99232 SBSQ HOSP IP/OBS MODERATE 35: CPT | Performed by: INTERNAL MEDICINE

## 2024-07-31 PROCEDURE — 85025 COMPLETE CBC W/AUTO DIFF WBC: CPT

## 2024-07-31 PROCEDURE — 85730 THROMBOPLASTIN TIME PARTIAL: CPT

## 2024-07-31 PROCEDURE — 86706 HEP B SURFACE ANTIBODY: CPT | Performed by: INTERNAL MEDICINE

## 2024-07-31 PROCEDURE — 2500000001 HC RX 250 WO HCPCS SELF ADMINISTERED DRUGS (ALT 637 FOR MEDICARE OP): Performed by: INTERNAL MEDICINE

## 2024-07-31 PROCEDURE — C9113 INJ PANTOPRAZOLE SODIUM, VIA: HCPCS

## 2024-07-31 PROCEDURE — 8010000001 HC DIALYSIS - HEMODIALYSIS PER DAY

## 2024-07-31 RX ORDER — VANCOMYCIN HYDROCHLORIDE 500 MG/100ML
500 INJECTION, SOLUTION INTRAVENOUS ONCE
Status: COMPLETED | OUTPATIENT
Start: 2024-07-31 | End: 2024-07-31

## 2024-07-31 RX ORDER — MIDODRINE HYDROCHLORIDE 10 MG/1
10 TABLET ORAL EVERY 8 HOURS
Status: DISCONTINUED | OUTPATIENT
Start: 2024-07-31 | End: 2024-08-01

## 2024-07-31 RX ORDER — OXYCODONE HYDROCHLORIDE 5 MG/1
5 TABLET ORAL ONCE
Status: COMPLETED | OUTPATIENT
Start: 2024-07-31 | End: 2024-07-31

## 2024-07-31 RX ORDER — HEPARIN SODIUM 10000 [USP'U]/100ML
0-4000 INJECTION, SOLUTION INTRAVENOUS CONTINUOUS
Status: DISCONTINUED | OUTPATIENT
Start: 2024-07-31 | End: 2024-07-31

## 2024-07-31 RX ORDER — INSULIN GLARGINE 100 [IU]/ML
5 INJECTION, SOLUTION SUBCUTANEOUS NIGHTLY
Status: DISCONTINUED | OUTPATIENT
Start: 2024-07-31 | End: 2024-08-02

## 2024-07-31 RX ORDER — HEPARIN SODIUM 10000 [USP'U]/100ML
0-4500 INJECTION, SOLUTION INTRAVENOUS CONTINUOUS
Status: DISCONTINUED | OUTPATIENT
Start: 2024-07-31 | End: 2024-07-31

## 2024-07-31 RX ORDER — HEPARIN SODIUM 10000 [USP'U]/100ML
0-4500 INJECTION, SOLUTION INTRAVENOUS CONTINUOUS
Status: DISCONTINUED | OUTPATIENT
Start: 2024-07-31 | End: 2024-08-12

## 2024-07-31 RX ORDER — PANTOPRAZOLE SODIUM 40 MG/10ML
40 INJECTION, POWDER, LYOPHILIZED, FOR SOLUTION INTRAVENOUS 2 TIMES DAILY
Status: DISCONTINUED | OUTPATIENT
Start: 2024-07-31 | End: 2024-08-01

## 2024-07-31 ASSESSMENT — PAIN SCALES - GENERAL
PAINLEVEL_OUTOF10: 0 - NO PAIN

## 2024-07-31 ASSESSMENT — PAIN - FUNCTIONAL ASSESSMENT
PAIN_FUNCTIONAL_ASSESSMENT: NO/DENIES PAIN
PAIN_FUNCTIONAL_ASSESSMENT: 0-10

## 2024-07-31 NOTE — SIGNIFICANT EVENT
Discussed the case in detail today with Loni Dennis and Dale. Given patients underlying extensive co-morbidities, we think that the risks of tricuspid vegectomy outweigh the benefits and will not be prudent. Hence, we will not be pursuing any invasive procedure from our standpoint.  At this time, we will sign off. We will be happy to discuss if there are any more questions.     Thank you for the opportunity to assist in this very complex and challenging case.     Michael Castillo MD.

## 2024-07-31 NOTE — CARE PLAN
The patient's goals for the shift include  feel better    The clinical goals for the shift include pt will maintain MAP above 60

## 2024-07-31 NOTE — PROGRESS NOTES
Edwar Hemphill   39 y.o.    @WT@  MRN/Room: [id number]/24/24-A    Subjective: No acute events overnight.   Objective:     Meds:   atorvastatin, 40 mg, Nightly  B complex-vitamin C-folic acid, 1 capsule, Daily  ceftaroline, 200 mg, q8h  cholecalciferol, 50,000 Units, Once per day on Monday Thursday  epoetin tyson or biosimilar, 10,000 Units, Once per day on Monday Wednesday Friday  folic acid, 1 mg, Daily  insulin glargine, 5 Units, Nightly  insulin lispro, 0-10 Units, TID  levothyroxine, 125 mcg, Daily before breakfast  melatonin, 6 mg, Nightly  midodrine, 10 mg, q8h  nystatin, 1 Application, BID  pantoprazole, 40 mg, Daily before breakfast  thiamine, 200 mg, Daily      heparin, Last Rate: 1,400 Units/hr (07/31/24 1219)  norepinephrine, Last Rate: Stopped (07/30/24 1800)      acetaminophen, 650 mg, q4h PRN   Or  acetaminophen, 650 mg, q4h PRN   Or  acetaminophen, 650 mg, q4h PRN  dextrose, 12.5 g, q15 min PRN  dextrose, 25 g, q15 min PRN  diphenhydrAMINE, 25 mg, q6h PRN  glucagon, 1 mg, q15 min PRN  glucagon, 1 mg, q15 min PRN  heparin, 1,000 Units, PRN  heparin, 1,000 Units, PRN  lidocaine, , 4x daily PRN  vancomycin, , Daily PRN        Vitals:    07/31/24 1200   BP:    Pulse:    Resp:    Temp: 36.3 °C (97.3 °F)   SpO2:           Intake/Output Summary (Last 24 hours) at 7/31/2024 1239  Last data filed at 7/31/2024 1230  Gross per 24 hour   Intake 903.32 ml   Output --   Net 903.32 ml       General appearance: no distress  Heart: RRR  Lungs: CTA bilat   Abdomen: soft, nt/nd  Extremities: no edema bilat  Neuro: No FND  Access: femoral TDC     Blood Labs:  Results for orders placed or performed during the hospital encounter of 07/22/24 (from the past 24 hour(s))   POCT GLUCOSE   Result Value Ref Range    POCT Glucose 196 (H) 74 - 99 mg/dL   POCT GLUCOSE   Result Value Ref Range    POCT Glucose 234 (H) 74 - 99 mg/dL   POCT GLUCOSE   Result Value Ref Range    POCT Glucose 149 (H) 74 - 99 mg/dL   Blood Gas Venous Full Panel    Result Value Ref Range    POCT pH, Venous 7.31 (L) 7.33 - 7.43 pH    POCT pCO2, Venous 42 41 - 51 mm Hg    POCT pO2, Venous 31 (L) 35 - 45 mm Hg    POCT SO2, Venous 41 (L) 45 - 75 %    POCT Oxy Hemoglobin, Venous 40.5 (L) 45.0 - 75.0 %    POCT Hematocrit Calculated, Venous 30.0 (L) 41.0 - 52.0 %    POCT Sodium, Venous 133 (L) 136 - 145 mmol/L    POCT Potassium, Venous 5.3 3.5 - 5.3 mmol/L    POCT Chloride, Venous 102 98 - 107 mmol/L    POCT Ionized Calicum, Venous 1.27 1.10 - 1.33 mmol/L    POCT Glucose, Venous 69 (L) 74 - 99 mg/dL    POCT Lactate, Venous 4.8 (HH) 0.4 - 2.0 mmol/L    POCT Base Excess, Venous -4.9 (L) -2.0 - 3.0 mmol/L    POCT HCO3 Calculated, Venous 21.1 (L) 22.0 - 26.0 mmol/L    POCT Hemoglobin, Venous 10.1 (L) 13.5 - 17.5 g/dL    POCT Anion Gap, Venous 15.0 10.0 - 25.0 mmol/L    Patient Temperature 37.0 degrees Celsius    FiO2 21 %   CBC   Result Value Ref Range    WBC 21.8 (H) 4.4 - 11.3 x10*3/uL    nRBC 0.2 (H) 0.0 - 0.0 /100 WBCs    RBC 3.22 (L) 4.50 - 5.90 x10*6/uL    Hemoglobin 9.6 (L) 13.5 - 17.5 g/dL    Hematocrit 29.7 (L) 41.0 - 52.0 %    MCV 92 80 - 100 fL    MCH 29.8 26.0 - 34.0 pg    MCHC 32.3 32.0 - 36.0 g/dL    RDW 22.3 (H) 11.5 - 14.5 %    Platelets 103 (L) 150 - 450 x10*3/uL   Renal function panel   Result Value Ref Range    Glucose 66 (L) 74 - 99 mg/dL    Sodium 135 (L) 136 - 145 mmol/L    Potassium 5.0 3.5 - 5.3 mmol/L    Chloride 100 98 - 107 mmol/L    Bicarbonate 21 21 - 32 mmol/L    Anion Gap 19 10 - 20 mmol/L    Urea Nitrogen 22 6 - 23 mg/dL    Creatinine 3.03 (H) 0.50 - 1.30 mg/dL    eGFR 26 (L) >60 mL/min/1.73m*2    Calcium 9.0 8.6 - 10.6 mg/dL    Phosphorus 5.2 (H) 2.5 - 4.9 mg/dL    Albumin 2.9 (L) 3.4 - 5.0 g/dL   Blood Gas Lactic Acid, Venous   Result Value Ref Range    POCT Lactate, Venous 3.3 (H) 0.4 - 2.0 mmol/L   CBC and Auto Differential   Result Value Ref Range    WBC 21.0 (H) 4.4 - 11.3 x10*3/uL    nRBC 0.1 (H) 0.0 - 0.0 /100 WBCs    RBC 2.82 (L) 4.50 - 5.90  x10*6/uL    Hemoglobin 8.4 (L) 13.5 - 17.5 g/dL    Hematocrit 26.7 (L) 41.0 - 52.0 %    MCV 95 80 - 100 fL    MCH 29.8 26.0 - 34.0 pg    MCHC 31.5 (L) 32.0 - 36.0 g/dL    RDW 22.5 (H) 11.5 - 14.5 %    Platelets 74 (L) 150 - 450 x10*3/uL    Neutrophils % 85.9 40.0 - 80.0 %    Immature Granulocytes %, Automated 1.0 (H) 0.0 - 0.9 %    Lymphocytes % 8.0 13.0 - 44.0 %    Monocytes % 4.6 2.0 - 10.0 %    Eosinophils % 0.2 0.0 - 6.0 %    Basophils % 0.3 0.0 - 2.0 %    Neutrophils Absolute 18.06 (H) 1.20 - 7.70 x10*3/uL    Immature Granulocytes Absolute, Automated 0.21 0.00 - 0.70 x10*3/uL    Lymphocytes Absolute 1.68 1.20 - 4.80 x10*3/uL    Monocytes Absolute 0.96 0.10 - 1.00 x10*3/uL    Eosinophils Absolute 0.04 0.00 - 0.70 x10*3/uL    Basophils Absolute 0.06 0.00 - 0.10 x10*3/uL   Comprehensive Metabolic Panel   Result Value Ref Range    Glucose 32 (LL) 74 - 99 mg/dL    Sodium 136 136 - 145 mmol/L    Potassium 4.7 3.5 - 5.3 mmol/L    Chloride 101 98 - 107 mmol/L    Bicarbonate 24 21 - 32 mmol/L    Anion Gap 16 10 - 20 mmol/L    Urea Nitrogen 24 (H) 6 - 23 mg/dL    Creatinine 3.15 (H) 0.50 - 1.30 mg/dL    eGFR 25 (L) >60 mL/min/1.73m*2    Calcium 8.5 (L) 8.6 - 10.6 mg/dL    Albumin 2.4 (L) 3.4 - 5.0 g/dL    Alkaline Phosphatase 189 (H) 33 - 120 U/L    Total Protein 5.6 (L) 6.4 - 8.2 g/dL    AST 16 9 - 39 U/L    Bilirubin, Total 0.9 0.0 - 1.2 mg/dL    ALT 25 10 - 52 U/L   Magnesium   Result Value Ref Range    Magnesium 2.16 1.60 - 2.40 mg/dL   Phosphorus   Result Value Ref Range    Phosphorus 5.2 (H) 2.5 - 4.9 mg/dL   Vancomycin   Result Value Ref Range    Vancomycin 31.1 (H) 5.0 - 20.0 ug/mL   Morphology   Result Value Ref Range    RBC Morphology See Below     Polychromasia Mild     Hypochromia Mild     Heather Cells Few    POCT GLUCOSE   Result Value Ref Range    POCT Glucose 35 (L) 74 - 99 mg/dL   POCT GLUCOSE   Result Value Ref Range    POCT Glucose 84 74 - 99 mg/dL   Hepatitis B surface antigen   Result Value Ref Range     Hepatitis B Surface AG Nonreactive Nonreactive   Hepatitis B surface antibody   Result Value Ref Range    Hepatitis B Surface AB <3.1 <10.0 mIU/mL   POCT GLUCOSE   Result Value Ref Range    POCT Glucose 182 (H) 74 - 99 mg/dL          Edwar Hemphill is a  39 y.o.  Year old , with PMHx of ESRD on HD (MWF, right femoral line), HFrEF (EF 35-40% 5/2024), T2DM, PVD (s/p left AKA and right forearm amputation), multiple line-related DVT (RIJ, LIJ, RUE, LUE, RLE, LLE; on Eliquis prior to recent upper GI bleed 6/15) presenting from [facility] due to hypotension prior to dialysis. Admitted to MICU for septic shock 2/2 mrsa bacteremia. Nephrology consulted for dialysis needs.     Updates:  -Vegectomy aborted yesterday due to concerns for infected thrombus in IVC and potential incomplete treatment without removal of TDC  -CVVH not resumed last night     #ESRD on HD MWF via R femoral TDC  #Hx of multiple HD catheter infections, mrsa bacteremia   -HD at [facility], nephrologist Dr. Licona - last HD 7/19  Started on CVVH 7/23 due to hemodynamic instability.   -hemodynamics: off levophed     #Septic shock 2/2 staph aureus bacteremia  #TV infective endocarditis   -vancomycin, ceftaroline   -Bcx 7/25 staph, 7/27 - staph heamolyticus. Bcx 7/29 NGTD     #MBD   - Renelva discontinued on 07/24     #Anemia   - epo           RECOMMENDATIONS:  -Hemodynamics stable on midodrine. Remains off vasopressors so will plan for IHD today, will give midodrine 15mg before and continue Midodrine 10mg TID  -Pending further discussion with IR to assess TDC removal and location of next catheter placement   - Supportive treatment as per ICU team.      Mary Hayes DO  Nephrology Fellow   Daytime / Weekend Renal Pager [phone]  After 7 pm Emergencies [phone] Pager [phone]

## 2024-07-31 NOTE — PROGRESS NOTES
Occupational Therapy                 Therapy Communication Note    Patient Name: Edwar Hemphill  MRN: [id number]  Today's Date: 7/31/2024     Discipline: Occupational Therapy    Missed Visit Reason: Missed Visit Reason: Patient placed on medical hold (iHD at bedside will reattempt as appropriate)    Missed Time: Attempt    Comment:

## 2024-07-31 NOTE — PROGRESS NOTES
"Medical Intensive Care - Daily Progress Note   Subjective    Edwar Hemphill is a 39 y.o. year old male patient admitted on 7/22/2024 with following ICU needs: septic shock required pressors, infective endocarditis     Interval History:  Patient seen and examined at bedside has no acute complaints at this time. He denies any new or worsening symptoms.      Overnight   Patient went hypertensive to 180/109 2300 likely due to septic emboli.  Lactate at the time of the event was 4.8 and decreased to 3.3.             Meds    Scheduled medications  atorvastatin, 40 mg, oral, Nightly  B complex-vitamin C-folic acid, 1 capsule, oral, Daily  ceftaroline, 200 mg, intravenous, q8h  cholecalciferol, 50,000 Units, oral, Once per day on Monday Thursday  epoetin tyson or biosimilar, 10,000 Units, subcutaneous, Once per day on Monday Wednesday Friday  folic acid, 1 mg, oral, Daily  insulin glargine, 5 Units, subcutaneous, Nightly  insulin lispro, 0-10 Units, subcutaneous, TID  levothyroxine, 125 mcg, oral, Daily before breakfast  melatonin, 6 mg, oral, Nightly  midodrine, 10 mg, oral, q8h  nystatin, 1 Application, Topical, BID  pantoprazole, 40 mg, oral, Daily before breakfast  thiamine, 200 mg, oral, Daily      Continuous medications  heparin, 0-4,500 Units/hr, Last Rate: 1,400 Units/hr (07/31/24 1219)  norepinephrine, 0-0.5 mcg/kg/min (Dosing Weight), Last Rate: Stopped (07/30/24 1800)      PRN medications  PRN medications: acetaminophen **OR** acetaminophen **OR** acetaminophen, dextrose, dextrose, diphenhydrAMINE, glucagon, glucagon, heparin, heparin, lidocaine, vancomycin     Objective    Blood pressure 102/70, pulse 77, temperature 36.3 °C (97.3 °F), temperature source Temporal, resp. rate 13, height 1.753 m (5' 9.02\"), weight 75.4 kg (166 lb 3.6 oz), SpO2 92%.     Physical Exam   Constitutional:       Appearance: Normal appearance.   HENT:      Mouth/Throat:      Mouth: Mucous membranes are moist.   Cardiovascular:      Rate and " Rhythm: Regular rate. Diastolic murmur on LSB and pulmonic area      Heart sounds: Normal heart sounds.   Pulmonary:      Effort: Pulmonary effort is normal.      Breath sounds: Normal breath sounds.   Abdominal:      Palpations: Abdomen is soft.   Musculoskeletal:      Comments: Left foot amputated above the knee  Right hand amputated above elbow  Posterior calcaneous wound has yellow discharge   Skin:     General: Skin is dry.   Neurological:      General: No focal deficit present.      Mental Status: He is oriented to person, place, and time.   Psychiatric:         Behavior: Behavior normal.         Thought Content: Thought content normal.         Judgment: Judgment normal.        Intake/Output Summary (Last 24 hours) at 7/31/2024 1248  Last data filed at 7/31/2024 1230  Gross per 24 hour   Intake 903.32 ml   Output --   Net 903.32 ml     Labs:   Results from last 72 hours   Lab Units 07/31/24  0542 07/30/24 2302 07/30/24  0528   SODIUM mmol/L 136 135* 135*   POTASSIUM mmol/L 4.7 5.0 4.1   CHLORIDE mmol/L 101 100 101   CO2 mmol/L 24 21 27   BUN mg/dL 24* 22 17   CREATININE mg/dL 3.15* 3.03* 2.39*   GLUCOSE mg/dL 32* 66* 107*   CALCIUM mg/dL 8.5* 9.0 8.3*   ANION GAP mmol/L 16 19 11   EGFR mL/min/1.73m*2 25* 26* 34*   PHOSPHORUS mg/dL 5.2* 5.2* 4.2      Results from last 72 hours   Lab Units 07/31/24  0542 07/30/24  2302 07/30/24  0528 07/29/24 2056 07/29/24  0102   WBC AUTO x10*3/uL 21.0* 21.8* 16.7*   < > 11.1   HEMOGLOBIN g/dL 8.4* 9.6* 8.1*   < > 6.6*   HEMATOCRIT % 26.7* 29.7* 24.9*   < > 20.0*   PLATELETS AUTO x10*3/uL 74* 103* 79*   < > 44*   NEUTROS PCT AUTO % 85.9  --   --   --  82.4   LYMPHO PCT MAN %  --   --  9.5  --   --    LYMPHS PCT AUTO % 8.0  --   --   --  10.8   MONO PCT MAN %  --   --  0.9  --   --    MONOS PCT AUTO % 4.6  --   --   --  3.7   EOSINO PCT MAN %  --   --  0.0  --   --    EOS PCT AUTO % 0.2  --   --   --  0.2    < > = values in this interval not displayed.        Micro/ID:     Lab  Results   Component Value Date    BLOODCULT No growth at 2 days 07/29/2024    BLOODCULT No growth at 2 days 07/29/2024         Assessment and Plan     Assessment:  Edwar Hemphill is a 39 y.o. male with PMHx significant for ESRD (MWF  via right femoral line), NICM,  HFrEF (LVEF 35-40% on 5/2024 POLI), anemia, DM2, Left AKA, RUE amputation, HTN, PVD, GERD.  Admitted  with septic shock required pressors and infective endocarditis with large TV vegetation, w/ bcx growing GPCs with staph aureus ( with history MRSA bacteremia before). Started on vanc/zosyn, fluid resuscitated, ID, cardiology and vascular medicine consulted. On TTE showed TV vegetation and possible source could be femoral catheter with attached vegetations, continued on CVVH based on nephrology consult. Changed zosyn to ceftaroline on 7/23. We were not able to take off the line as a source of infection given his DVTs in subclavian, LIJ, b/l innominate occlusion and complete occlusion of intrahepatic IVC its hard to find another access at this time. IR, cardiology, ID, Vascular medicine are on board. he also had  Acinetobacter baumanni positive on his wound culture. POLI has done 7/24/24 showed large vegetation on the catheter measures at least 1.9cm x 2.5 cm with  involvement of the TV with likely destruction of the septal leaflet of the TV and resultant severe TR.  He was previously on CVVH but will start HD 7/31/24. CTA showed Severely attenuated superior vena cava, bilateral brachiocephalic, subclavian and internal jugular veins with the calcifications in the brachiocephalic veins with extensive collaterals in the chest wall likely representing chronic thrombosis. Patient had vegectomy aborted due to to concerns for infected thrombus in the IVC and potential incomplete treatment without removal of the TDC.  Recent L wrist synovial fluid was positive for MRSA he is now s/p washout currently on Vancomycin and Ceftaroline.     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   -positive staph aureus bacteremia   -positive second blood culture with Gram positive cocci, clusters, on 7/25  - Ceftaroline started on 7/23 and Vancomycin started on 7/22   -palliative care consulted and patient said he wants to receive treatments  - 7/27 Blood cultures positive for Staph Haemolyticus  - Wrist synovial fluid culture positive for MRSA, now s/p L wrist debridement/washout 7/29  - Levo restarted at 0.01 7/29 with MAP goal >60     Plan:  - vegectomy aborted due to to concerns for infected thrombus in the IVC and potential incomplete treatment without removal of the TDC  - Currently of of levo  MAP goal >60 (Currently 0.01)   - Currently on ceftaroline and vancomycin   - Awaiting recommendations from Dr. Dennis's Cardiology team   -Blood cultures collected 7/29 NGTD, new cultures obtained 7/31            #HFrecEF   - last EF 39% (lowest EF 26%)   - Presumed ICM          # hx of paroxysmal Afib  #Multiple line-related DVTs   - CHADSVASC 6 (DM, PAD, TIA, HTN, HF)   - b/l subclavian DVT, L IJ DVT, b/l innominate occlusion, complete occlusion of intrahepatic IVC s/p angioplasty   - priorly on Eliquis however recent GI bleed 6/15 at [facility] , eliquis was held and patient was planned for heparin trial but left ama prior to trial   - not a candidate for IVC filter given femoral TDC   - last DVT US w/ resolution of RLE DVT   -discontinued heparin due to low plts   -PF4  with normal activity       Plan:  -Will start patient on DVT prophylaxis  - Will monitor H&H and platelet counts           PULMONARY:  NAVI  CXR at bl     RENAL/GENITOURINARY:  #ESRD MWF thru right femoral line   -  iHD TTS since 2016, anuric   - RUE AVG c/b infection s/p RUE amputation above elbow 2021   - multiple TDCs (including iliolumbar)   - femoral TDC recently placed at  (14.5 [place] and 42 cm long)  - lokelma 5 MWF Held iso of hypokalemia  - home sevelmer held  Plan   -Nephrology is following patient currently receiving HD   - IR consulted considering replacement of thrombotic right femoral line       GASTROENTEROLOGY:  #Esophagitis   #GERD  ::EGD on 6/8 clipped two foci of active esophageal bleeding   Plan:  -cw home PPI BID        ENDOCRINOLOGY:  #hypothyroidism  -last TSH 1.9  Plan:  -cw home levo 125     #T1DM  -home regiment : 8u glargine, SSI  -Hba1c 7.3  Plan:   - Decreased glargine from  10 u --> 5u with SSI #2       HEMATOLOGY:  #Chronic anemia  #Anemia of chronic disease  > Hgb 6.6 7/29 transfused 1 unit --> 8.4 s/p tx--> 8/1 (7/30)    Plan:  - c/w home epo m/w/f  - trend cbc   - if Hb<7 transfuse      #Thrombocytopenia  -no concern for KALLIE now, possibly due to active infection   -PF4 activity normal  -Fibrinogen 201, INR 1.4, aPTT 34, PT 15  -plt down trending to 44 7/29 --> 84 s/p 1 unit platelets--> 79 (7/30)--> 74(7/31)   Plan:  -FU on CBC         MUSCULOSKELETAL/ SKIN:  NAVI     INFECTIOUS DISEASE:  #Sepsis with line as Likely  source  #endocarditis   # Septic arthritis L wrist   >(7/27) Blood cultures positive for Staph Haemolyticus  >Wrist synovial fluid culture positive for MRSA, now s/p L wrist debridement/washout (7/29)  >has previously needed double coverage for 4 weeks with van/dapto to cover infection. But developed lung toxicity 2/2 dapto. And switched to linezolid.    Plan:  - Infectious Disease is following the patient will reach out for recommendations as patient has increasing  white count   - Continue Ceftaroline & Vancomycin                 ICU Check List      FEN:  Fluids: restricted   Electrolytes: k>4 mg>2 caution as ESRD  Nutrition: renal diet  DVT ppx: none  GI ppx: PPI  Bowel care: Miralax prn  Access:  PIV, Femoral line        Social:  Code: Full Code    NOK:   Extended Emergency Contact Information  Primary Emergency Contact: Shanthi Hemphill  Mobile Phone: [phone]  Relation: Parent  Secondary Emergency Contact: Terra Scott  Mobile Phone: [phone]       Don Hernández MD   07/31/24 at 12:48 PM   PGY-1 Internal Medicine Resident     Disclaimer: Documentation completed with the information available at the time of input. The times in the chart may not be reflective of actual patient care times, interventions, or procedures. Documentation occurs after the physical care of the patient.

## 2024-07-31 NOTE — SIGNIFICANT EVENT
Palliative medicine team following for goals of care, pt/family support, and symptom management. Pt resting comfortably during rounds this AM. Spoke with RN. Chart reviewed. Pt now off Levo, vegetectomy was aborted due to c/f infected thrombus.     Palliative team can be available for assist with GOC as needed. We will continue to follow.     Cori Woodward DNP, CNP

## 2024-07-31 NOTE — PROGRESS NOTES
Spiritual Care Visit    Clinical Encounter Type  Visited With: Patient not available (Pt sleeping at time of this visit.)  Routine Visit: Introduction

## 2024-07-31 NOTE — CONSULTS
Vancomycin Dosing by Pharmacy- FOLLOW UP    Edwar Hemphill is a 39 y.o. year old male who Pharmacy has been consulted for vancomycin dosing for endocarditis/endovascular infection. Based on the patient's indication and renal status this patient is being dosed based on a goal trough/random level of 15-20.     Renal function is currently declining. Patient was on CVVH. Stopped. Nephrology discussing SLED or iHD.     Current vancomycin dose: Hold dose.    Most recent trough level: 31.1 mcg/mL    Visit Vitals  /70   Pulse 87   Temp 36.1 °C (97 °F) (Temporal)   Resp 16        Lab Results   Component Value Date    CREATININE 3.15 (H) 2024    CREATININE 3.03 (H) 2024    CREATININE 2.39 (H) 2024    CREATININE 2.23 (H) 2024        Patient weight is as follows:   Vitals:    24 0500   Weight: 75.4 kg (166 lb 3.6 oz)       Cultures:  Susceptibility data for the encounter in last 14 days.  Collected Specimen Info Organism Clindamycin Erythromycin Oxacillin Rifampin Tetracycline Trimethoprim/Sulfamethoxazole Vancomycin   24 Fluid from Synovial Methicillin Resistant Staphylococcus aureus (MRSA)  R  R  R   R  S  S   24 Blood culture from Peripheral Venipuncture Staphylococcus haemolyticus  R  R  R   S  R  S   24 Blood culture from Peripheral Venipuncture Staphylococcus aureus          24 Blood culture from Peripheral Venipuncture Staphylococcus aureus          24 Blood culture from Peripheral Venipuncture Methicillin Resistant Staphylococcus aureus (MRSA)  R  R  R  I  R  S  S   24 Blood culture from Peripheral Venipuncture Staphylococcus aureus               I/O last 3 completed shifts:  In: 861.1 (12.1 mL/kg) [P.O.:120; I.V.:91.1 (1.3 mL/kg); IV Piggyback:650]  Out: - (0 mL/kg)   Dosing Weight: 70.9 kg   I/O during current shift:  I/O this shift:  In: 410 [P.O.:360; IV Piggyback:50]  Out: -     Temp (24hrs), Av.6 °C (97.9 °F), Min:36.1 °C (97 °F), Max:37.3  °C (99.1 °F)      Assessment/Plan    Level is SUPRAtherapeutic. Vancomycin will continue to be held.   The next level will be obtained on 8/2/24 with morning labs, unless renal plan changes, pharmacy will follow. May be obtained sooner if clinically indicated.   Will continue to monitor renal function daily while on vancomycin and order serum creatinine at least every 48 hours if not already ordered.  Follow for continued vancomycin needs, clinical response, and signs/symptoms of toxicity.       April Akins, PharmD

## 2024-07-31 NOTE — CARE PLAN
Problem: Skin  Goal: Decreased wound size/increased tissue granulation at next dressing change  Outcome: Progressing  Goal: Participates in plan/prevention/treatment measures  Outcome: Progressing  Goal: Prevent/manage excess moisture  Outcome: Progressing  Flowsheets (Taken 7/31/2024 0857)  Prevent/manage excess moisture:   Moisturize dry skin   Monitor for/manage infection if present   Cleanse incontinence/protect with barrier cream  Goal: Prevent/minimize sheer/friction injuries  Outcome: Progressing  Goal: Promote/optimize nutrition  Outcome: Progressing  Goal: Promote skin healing  Outcome: Progressing  Flowsheets (Taken 7/31/2024 0857)  Promote skin healing:   Assess skin/pad under line(s)/device(s)   Turn/reposition every 2 hours/use positioning/transfer devices   The patient's goals for the shift include      The clinical goals for the shift include pt will remain off vasoactive drips

## 2024-07-31 NOTE — PROGRESS NOTES
Physical Therapy                 Therapy Communication Note    Patient Name: Edwar Hemphill  MRN: [id number]  Today's Date: 7/31/2024     Discipline: Physical Therapy    Missed Visit Reason: Missed Visit Reason:  (iHD at the bedside.)    Missed Time: Attempt    Comment:

## 2024-08-01 ENCOUNTER — APPOINTMENT (OUTPATIENT)
Dept: CARDIOLOGY | Facility: HOSPITAL | Age: 39
End: 2024-08-01
Payer: COMMERCIAL

## 2024-08-01 LAB
ALBUMIN SERPL BCP-MCNC: 2.4 G/DL (ref 3.4–5)
ALP SERPL-CCNC: 180 U/L (ref 33–120)
ALT SERPL W P-5'-P-CCNC: 22 U/L (ref 10–52)
ANION GAP BLDV CALCULATED.4IONS-SCNC: 11 MMOL/L (ref 10–25)
ANION GAP BLDV CALCULATED.4IONS-SCNC: 8 MMOL/L (ref 10–25)
ANION GAP SERPL CALC-SCNC: 11 MMOL/L (ref 10–20)
AST SERPL W P-5'-P-CCNC: 24 U/L (ref 9–39)
BACTERIA SPEC CULT: NORMAL
BACTERIA SPEC CULT: NORMAL
BASE EXCESS BLDV CALC-SCNC: -0.8 MMOL/L (ref -2–3)
BASE EXCESS BLDV CALC-SCNC: -1.5 MMOL/L (ref -2–3)
BASOPHILS # BLD AUTO: 0.04 X10*3/UL (ref 0–0.1)
BASOPHILS # BLD AUTO: 0.05 X10*3/UL (ref 0–0.1)
BASOPHILS # BLD MANUAL: 0 X10*3/UL (ref 0–0.1)
BASOPHILS NFR BLD AUTO: 0.4 %
BASOPHILS NFR BLD AUTO: 0.5 %
BASOPHILS NFR BLD MANUAL: 0 %
BILIRUB SERPL-MCNC: 0.7 MG/DL (ref 0–1.2)
BODY TEMPERATURE: 37 DEGREES CELSIUS
BODY TEMPERATURE: 37 DEGREES CELSIUS
BUN SERPL-MCNC: 15 MG/DL (ref 6–23)
CA-I BLDV-SCNC: 1.2 MMOL/L (ref 1.1–1.33)
CA-I BLDV-SCNC: 1.23 MMOL/L (ref 1.1–1.33)
CALCIUM SERPL-MCNC: 7.9 MG/DL (ref 8.6–10.6)
CHLORIDE BLDV-SCNC: 100 MMOL/L (ref 98–107)
CHLORIDE BLDV-SCNC: 101 MMOL/L (ref 98–107)
CHLORIDE SERPL-SCNC: 101 MMOL/L (ref 98–107)
CO2 SERPL-SCNC: 26 MMOL/L (ref 21–32)
CREAT SERPL-MCNC: 2.28 MG/DL (ref 0.5–1.3)
EGFRCR SERPLBLD CKD-EPI 2021: 36 ML/MIN/1.73M*2
EOSINOPHIL # BLD AUTO: 0.05 X10*3/UL (ref 0–0.7)
EOSINOPHIL # BLD AUTO: 0.07 X10*3/UL (ref 0–0.7)
EOSINOPHIL # BLD MANUAL: 0 X10*3/UL (ref 0–0.7)
EOSINOPHIL # BLD MANUAL: 0.1 X10*3/UL (ref 0–0.7)
EOSINOPHIL NFR BLD AUTO: 0.5 %
EOSINOPHIL NFR BLD AUTO: 0.7 %
EOSINOPHIL NFR BLD MANUAL: 0 %
EOSINOPHIL NFR BLD MANUAL: 0.9 %
ERYTHROCYTE [DISTWIDTH] IN BLOOD BY AUTOMATED COUNT: 21.5 % (ref 11.5–14.5)
ERYTHROCYTE [DISTWIDTH] IN BLOOD BY AUTOMATED COUNT: 22.1 % (ref 11.5–14.5)
ERYTHROCYTE [DISTWIDTH] IN BLOOD BY AUTOMATED COUNT: 22.2 % (ref 11.5–14.5)
ERYTHROCYTE [DISTWIDTH] IN BLOOD BY AUTOMATED COUNT: 22.3 % (ref 11.5–14.5)
GLUCOSE BLD MANUAL STRIP-MCNC: 112 MG/DL (ref 74–99)
GLUCOSE BLD MANUAL STRIP-MCNC: 157 MG/DL (ref 74–99)
GLUCOSE BLD MANUAL STRIP-MCNC: 271 MG/DL (ref 74–99)
GLUCOSE BLDV-MCNC: 159 MG/DL (ref 74–99)
GLUCOSE BLDV-MCNC: 166 MG/DL (ref 74–99)
GLUCOSE SERPL-MCNC: 293 MG/DL (ref 74–99)
GRAM STN SPEC: NORMAL
HCO3 BLDV-SCNC: 24.7 MMOL/L (ref 22–26)
HCO3 BLDV-SCNC: 25.3 MMOL/L (ref 22–26)
HCT VFR BLD AUTO: 23.1 % (ref 41–52)
HCT VFR BLD AUTO: 23.3 % (ref 41–52)
HCT VFR BLD AUTO: 23.5 % (ref 41–52)
HCT VFR BLD AUTO: 24.2 % (ref 41–52)
HCT VFR BLD EST: 25 % (ref 41–52)
HCT VFR BLD EST: 26 % (ref 41–52)
HGB BLD-MCNC: 7.7 G/DL (ref 13.5–17.5)
HGB BLD-MCNC: 7.8 G/DL (ref 13.5–17.5)
HGB BLDV-MCNC: 8.3 G/DL (ref 13.5–17.5)
HGB BLDV-MCNC: 8.5 G/DL (ref 13.5–17.5)
HYPOCHROMIA BLD QL SMEAR: NORMAL
HYPOCHROMIA BLD QL SMEAR: NORMAL
IMM GRANULOCYTES # BLD AUTO: 0.11 X10*3/UL (ref 0–0.7)
IMM GRANULOCYTES # BLD AUTO: 0.11 X10*3/UL (ref 0–0.7)
IMM GRANULOCYTES # BLD AUTO: 0.12 X10*3/UL (ref 0–0.7)
IMM GRANULOCYTES # BLD AUTO: 0.13 X10*3/UL (ref 0–0.7)
IMM GRANULOCYTES NFR BLD AUTO: 1 % (ref 0–0.9)
IMM GRANULOCYTES NFR BLD AUTO: 1 % (ref 0–0.9)
IMM GRANULOCYTES NFR BLD AUTO: 1.1 % (ref 0–0.9)
IMM GRANULOCYTES NFR BLD AUTO: 1.2 % (ref 0–0.9)
INHALED O2 CONCENTRATION: 21 %
INHALED O2 CONCENTRATION: 24 %
LACTATE BLDV-SCNC: 2.2 MMOL/L (ref 0.4–2)
LACTATE BLDV-SCNC: 3.5 MMOL/L (ref 0.4–2)
LACTATE SERPL-SCNC: 1.7 MMOL/L (ref 0.4–2)
LACTATE SERPL-SCNC: 2.5 MMOL/L (ref 0.4–2)
LACTATE SERPL-SCNC: 3.7 MMOL/L (ref 0.4–2)
LYMPHOCYTES # BLD AUTO: 1.55 X10*3/UL (ref 1.2–4.8)
LYMPHOCYTES # BLD AUTO: 1.67 X10*3/UL (ref 1.2–4.8)
LYMPHOCYTES # BLD MANUAL: 0.57 X10*3/UL (ref 1.2–4.8)
LYMPHOCYTES # BLD MANUAL: 0.81 X10*3/UL (ref 1.2–4.8)
LYMPHOCYTES NFR BLD AUTO: 15.4 %
LYMPHOCYTES NFR BLD AUTO: 15.8 %
LYMPHOCYTES NFR BLD MANUAL: 5.4 %
LYMPHOCYTES NFR BLD MANUAL: 6.9 %
MAGNESIUM SERPL-MCNC: 2.03 MG/DL (ref 1.6–2.4)
MCH RBC QN AUTO: 29.5 PG (ref 26–34)
MCH RBC QN AUTO: 29.5 PG (ref 26–34)
MCH RBC QN AUTO: 29.7 PG (ref 26–34)
MCH RBC QN AUTO: 30.1 PG (ref 26–34)
MCHC RBC AUTO-ENTMCNC: 32.2 G/DL (ref 32–36)
MCHC RBC AUTO-ENTMCNC: 33.2 G/DL (ref 32–36)
MCHC RBC AUTO-ENTMCNC: 33.3 G/DL (ref 32–36)
MCHC RBC AUTO-ENTMCNC: 33.5 G/DL (ref 32–36)
MCV RBC AUTO: 89 FL (ref 80–100)
MCV RBC AUTO: 89 FL (ref 80–100)
MCV RBC AUTO: 90 FL (ref 80–100)
MCV RBC AUTO: 92 FL (ref 80–100)
MONOCYTES # BLD AUTO: 0.52 X10*3/UL (ref 0.1–1)
MONOCYTES # BLD AUTO: 0.62 X10*3/UL (ref 0.1–1)
MONOCYTES # BLD MANUAL: 0.1 X10*3/UL (ref 0.1–1)
MONOCYTES # BLD MANUAL: 0.31 X10*3/UL (ref 0.1–1)
MONOCYTES NFR BLD AUTO: 5.2 %
MONOCYTES NFR BLD AUTO: 5.9 %
MONOCYTES NFR BLD MANUAL: 0.9 %
MONOCYTES NFR BLD MANUAL: 2.6 %
NEUTROPHILS # BLD AUTO: 7.78 X10*3/UL (ref 1.2–7.7)
NEUTROPHILS # BLD AUTO: 8.03 X10*3/UL (ref 1.2–7.7)
NEUTROPHILS NFR BLD AUTO: 76.1 %
NEUTROPHILS NFR BLD AUTO: 77.3 %
NEUTS SEG # BLD MANUAL: 10.68 X10*3/UL (ref 1.2–7)
NEUTS SEG # BLD MANUAL: 9.85 X10*3/UL (ref 1.2–7)
NEUTS SEG NFR BLD MANUAL: 90.5 %
NEUTS SEG NFR BLD MANUAL: 92.9 %
NRBC BLD-RTO: 0 /100 WBCS (ref 0–0)
NRBC BLD-RTO: 0.2 /100 WBCS (ref 0–0)
NRBC BLD-RTO: 0.4 /100 WBCS (ref 0–0)
NRBC BLD-RTO: 0.4 /100 WBCS (ref 0–0)
OXYHGB MFR BLDV: 47.9 % (ref 45–75)
OXYHGB MFR BLDV: 60.5 % (ref 45–75)
PCO2 BLDV: 48 MM HG (ref 41–51)
PCO2 BLDV: 48 MM HG (ref 41–51)
PH BLDV: 7.32 PH (ref 7.33–7.43)
PH BLDV: 7.33 PH (ref 7.33–7.43)
PHOSPHATE SERPL-MCNC: 4.3 MG/DL (ref 2.5–4.9)
PLATELET # BLD AUTO: 44 X10*3/UL (ref 150–450)
PLATELET # BLD AUTO: 49 X10*3/UL (ref 150–450)
PLATELET # BLD AUTO: 52 X10*3/UL (ref 150–450)
PLATELET # BLD AUTO: 56 X10*3/UL (ref 150–450)
PO2 BLDV: 34 MM HG (ref 35–45)
PO2 BLDV: 42 MM HG (ref 35–45)
POLYCHROMASIA BLD QL SMEAR: ABNORMAL
POLYCHROMASIA BLD QL SMEAR: ABNORMAL
POLYCHROMASIA BLD QL SMEAR: NORMAL
POTASSIUM BLDV-SCNC: 4.6 MMOL/L (ref 3.5–5.3)
POTASSIUM BLDV-SCNC: 4.7 MMOL/L (ref 3.5–5.3)
POTASSIUM SERPL-SCNC: 4.6 MMOL/L (ref 3.5–5.3)
PROT SERPL-MCNC: 5.2 G/DL (ref 6.4–8.2)
RBC # BLD AUTO: 2.59 X10*6/UL (ref 4.5–5.9)
RBC # BLD AUTO: 2.61 X10*6/UL (ref 4.5–5.9)
RBC # BLD AUTO: 2.63 X10*6/UL (ref 4.5–5.9)
RBC # BLD AUTO: 2.64 X10*6/UL (ref 4.5–5.9)
RBC MORPH BLD: ABNORMAL
RBC MORPH BLD: ABNORMAL
RBC MORPH BLD: NORMAL
RBC MORPH BLD: NORMAL
SAO2 % BLDV: 49 % (ref 45–75)
SAO2 % BLDV: 62 % (ref 45–75)
SODIUM BLDV-SCNC: 130 MMOL/L (ref 136–145)
SODIUM BLDV-SCNC: 131 MMOL/L (ref 136–145)
SODIUM SERPL-SCNC: 133 MMOL/L (ref 136–145)
TARGETS BLD QL SMEAR: ABNORMAL
TARGETS BLD QL SMEAR: NORMAL
TOTAL CELLS COUNTED BLD: 112
TOTAL CELLS COUNTED BLD: 116
UFH PPP CHRO-ACNC: 0.5 IU/ML
UFH PPP CHRO-ACNC: 0.7 IU/ML
WBC # BLD AUTO: 10.1 X10*3/UL (ref 4.4–11.3)
WBC # BLD AUTO: 10.6 X10*3/UL (ref 4.4–11.3)
WBC # BLD AUTO: 10.6 X10*3/UL (ref 4.4–11.3)
WBC # BLD AUTO: 11.8 X10*3/UL (ref 4.4–11.3)

## 2024-08-01 PROCEDURE — 93010 ELECTROCARDIOGRAM REPORT: CPT | Performed by: INTERNAL MEDICINE

## 2024-08-01 PROCEDURE — 2500000001 HC RX 250 WO HCPCS SELF ADMINISTERED DRUGS (ALT 637 FOR MEDICARE OP)

## 2024-08-01 PROCEDURE — C9113 INJ PANTOPRAZOLE SODIUM, VIA: HCPCS

## 2024-08-01 PROCEDURE — 85007 BL SMEAR W/DIFF WBC COUNT: CPT

## 2024-08-01 PROCEDURE — 84132 ASSAY OF SERUM POTASSIUM: CPT

## 2024-08-01 PROCEDURE — 84075 ASSAY ALKALINE PHOSPHATASE: CPT

## 2024-08-01 PROCEDURE — 85027 COMPLETE CBC AUTOMATED: CPT

## 2024-08-01 PROCEDURE — 83735 ASSAY OF MAGNESIUM: CPT

## 2024-08-01 PROCEDURE — 1200000002 HC GENERAL ROOM WITH TELEMETRY DAILY

## 2024-08-01 PROCEDURE — 85025 COMPLETE CBC W/AUTO DIFF WBC: CPT

## 2024-08-01 PROCEDURE — 84100 ASSAY OF PHOSPHORUS: CPT

## 2024-08-01 PROCEDURE — 2500000004 HC RX 250 GENERAL PHARMACY W/ HCPCS (ALT 636 FOR OP/ED): Mod: JZ

## 2024-08-01 PROCEDURE — 85520 HEPARIN ASSAY: CPT

## 2024-08-01 PROCEDURE — 2500000005 HC RX 250 GENERAL PHARMACY W/O HCPCS

## 2024-08-01 PROCEDURE — 2500000002 HC RX 250 W HCPCS SELF ADMINISTERED DRUGS (ALT 637 FOR MEDICARE OP, ALT 636 FOR OP/ED)

## 2024-08-01 PROCEDURE — 2500000004 HC RX 250 GENERAL PHARMACY W/ HCPCS (ALT 636 FOR OP/ED)

## 2024-08-01 PROCEDURE — 2500000001 HC RX 250 WO HCPCS SELF ADMINISTERED DRUGS (ALT 637 FOR MEDICARE OP): Performed by: INTERNAL MEDICINE

## 2024-08-01 PROCEDURE — 83605 ASSAY OF LACTIC ACID: CPT

## 2024-08-01 PROCEDURE — 99291 CRITICAL CARE FIRST HOUR: CPT

## 2024-08-01 PROCEDURE — 36415 COLL VENOUS BLD VENIPUNCTURE: CPT

## 2024-08-01 PROCEDURE — 99232 SBSQ HOSP IP/OBS MODERATE 35: CPT | Performed by: INTERNAL MEDICINE

## 2024-08-01 PROCEDURE — 93005 ELECTROCARDIOGRAM TRACING: CPT

## 2024-08-01 PROCEDURE — 82947 ASSAY GLUCOSE BLOOD QUANT: CPT

## 2024-08-01 RX ORDER — ACETAMINOPHEN 650 MG/1
650 SUPPOSITORY RECTAL EVERY 4 HOURS PRN
Status: DISCONTINUED | OUTPATIENT
Start: 2024-08-01 | End: 2024-08-01

## 2024-08-01 RX ORDER — ACETAMINOPHEN 325 MG/1
650 TABLET ORAL EVERY 4 HOURS PRN
Status: DISCONTINUED | OUTPATIENT
Start: 2024-08-01 | End: 2024-08-01

## 2024-08-01 RX ORDER — PANTOPRAZOLE SODIUM 40 MG/1
40 TABLET, DELAYED RELEASE ORAL
Status: DISCONTINUED | OUTPATIENT
Start: 2024-08-02 | End: 2024-08-08

## 2024-08-01 RX ORDER — OXYCODONE HYDROCHLORIDE 5 MG/1
5 TABLET ORAL EVERY 6 HOURS PRN
Status: DISCONTINUED | OUTPATIENT
Start: 2024-08-01 | End: 2024-08-05

## 2024-08-01 RX ORDER — OXYCODONE HYDROCHLORIDE 5 MG/1
5 TABLET ORAL EVERY 6 HOURS PRN
Status: DISCONTINUED | OUTPATIENT
Start: 2024-08-01 | End: 2024-08-01

## 2024-08-01 RX ORDER — ACETAMINOPHEN 160 MG/5ML
650 SOLUTION ORAL EVERY 4 HOURS PRN
Status: DISCONTINUED | OUTPATIENT
Start: 2024-08-01 | End: 2024-08-01

## 2024-08-01 RX ORDER — NOREPINEPHRINE BITARTRATE/D5W 8 MG/250ML
PLASTIC BAG, INJECTION (ML) INTRAVENOUS
Status: COMPLETED
Start: 2024-08-01 | End: 2024-08-01

## 2024-08-01 RX ORDER — NOREPINEPHRINE BITARTRATE/D5W 8 MG/250ML
.01-1 PLASTIC BAG, INJECTION (ML) INTRAVENOUS CONTINUOUS
Status: DISCONTINUED | OUTPATIENT
Start: 2024-08-01 | End: 2024-08-02

## 2024-08-01 ASSESSMENT — PAIN SCALES - GENERAL
PAINLEVEL_OUTOF10: 8
PAINLEVEL_OUTOF10: 5 - MODERATE PAIN
PAINLEVEL_OUTOF10: 6

## 2024-08-01 ASSESSMENT — PAIN - FUNCTIONAL ASSESSMENT
PAIN_FUNCTIONAL_ASSESSMENT: 0-10

## 2024-08-01 NOTE — CARE PLAN
Problem: Pain - Adult  Goal: Verbalizes/displays adequate comfort level or baseline comfort level  Outcome: Progressing  Flowsheets (Taken 7/29/2024 0930 by Long Eason RN)  Verbalizes/displays adequate comfort level or baseline comfort level:   Encourage patient to monitor pain and request assistance   Administer analgesics based on type and severity of pain and evaluate response   Assess pain using appropriate pain scale   Implement non-pharmacological measures as appropriate and evaluate response   Consider cultural and social influences on pain and pain management     Problem: Safety - Adult  Goal: Free from fall injury  Outcome: Progressing  Flowsheets (Taken 7/29/2024 0930 by Long Eason RN)  Free from fall injury: Instruct family/caregiver on patient safety     Problem: Chronic Conditions and Co-morbidities  Goal: Patient's chronic conditions and co-morbidity symptoms are monitored and maintained or improved  Outcome: Progressing  Flowsheets (Taken 8/1/2024 1104)  Care Plan - Patient's Chronic Conditions and Co-Morbidity Symptoms are Monitored and Maintained or Improved:   Monitor and assess patient's chronic conditions and comorbid symptoms for stability, deterioration, or improvement   Collaborate with multidisciplinary team to address chronic and comorbid conditions and prevent exacerbation or deterioration   Update acute care plan with appropriate goals if chronic or comorbid symptoms are exacerbated and prevent overall improvement and discharge     Problem: Skin  Goal: Decreased wound size/increased tissue granulation at next dressing change  8/1/2024 1108 by Mecca Garber RN  Outcome: Progressing  Flowsheets (Taken 7/30/2024 1019 by Kim Olvera RN)  Decreased wound size/increased tissue granulation at next dressing change: Protective dressings over bony prominences  8/1/2024 1104 by Mecca Garber RN  Outcome: Progressing  Flowsheets (Taken 7/30/2024 1019 by Kim Olvera RN)  Decreased  wound size/increased tissue granulation at next dressing change: Protective dressings over bony prominences  Goal: Participates in plan/prevention/treatment measures  8/1/2024 1108 by Mecca Garber RN  Outcome: Progressing  Flowsheets (Taken 7/30/2024 0646 by Praveen Cristina, RN)  Participates in plan/prevention/treatment measures: Elevate heels  8/1/2024 1104 by Mecca Garber RN  Outcome: Progressing  Flowsheets (Taken 7/30/2024 0646 by Praveen Cristina, RN)  Participates in plan/prevention/treatment measures: Elevate heels  Goal: Prevent/manage excess moisture  8/1/2024 1108 by Mecca Garber RN  Outcome: Progressing  Flowsheets (Taken 7/31/2024 0857 by Kim Olvera RN)  Prevent/manage excess moisture:   Moisturize dry skin   Monitor for/manage infection if present   Cleanse incontinence/protect with barrier cream  8/1/2024 1104 by Mecca Garber RN  Outcome: Progressing  Flowsheets (Taken 7/31/2024 0857 by Kim Olvera RN)  Prevent/manage excess moisture:   Moisturize dry skin   Monitor for/manage infection if present   Cleanse incontinence/protect with barrier cream  Goal: Prevent/minimize sheer/friction injuries  8/1/2024 1108 by Mecca Garber RN  Outcome: Progressing  Flowsheets (Taken 7/30/2024 1019 by Kim Olvera RN)  Prevent/minimize sheer/friction injuries:   Turn/reposition every 2 hours/use positioning/transfer devices   HOB 30 degrees or less  8/1/2024 1104 by Mecca Garber RN  Outcome: Progressing  Flowsheets (Taken 7/30/2024 1019 by Kim Olvera RN)  Prevent/minimize sheer/friction injuries:   Turn/reposition every 2 hours/use positioning/transfer devices   HOB 30 degrees or less  Goal: Promote/optimize nutrition  8/1/2024 1108 by Mecca Garber RN  Outcome: Progressing  Flowsheets (Taken 7/30/2024 0646 by Praveen Cristina RN)  Promote/optimize nutrition: Monitor/record intake including meals  8/1/2024 1104 by Mecca Garber RN  Outcome: Progressing  Flowsheets (Taken 7/30/2024 0646  by Praveen Cristina RN)  Promote/optimize nutrition: Monitor/record intake including meals  Goal: Promote skin healing  8/1/2024 1108 by Mecca Garber RN  Outcome: Progressing  Flowsheets (Taken 7/31/2024 0857 by Kim Olvera RN)  Promote skin healing:   Assess skin/pad under line(s)/device(s)   Turn/reposition every 2 hours/use positioning/transfer devices  8/1/2024 1104 by Mecca Garber RN  Outcome: Progressing  Flowsheets (Taken 7/31/2024 0857 by Kim Olvera RN)  Promote skin healing:   Assess skin/pad under line(s)/device(s)   Turn/reposition every 2 hours/use positioning/transfer devices     Problem: Diabetes  Goal: Maintain electrolyte levels within acceptable range throughout shift  Outcome: Progressing  Flowsheets (Taken 8/1/2024 1104)  Maintain electrolyte levels within acceptable range throughout shift: Med administration/monitoring of effect  Goal: Maintain glucose levels >70mg/dl to <250mg/dl throughout shift  Outcome: Progressing  Flowsheets (Taken 8/1/2024 1104)  Maintain glucose levels >70mg/dl to <250mg/dl throughout shift: Med administration/monitoring of effect  Goal: No changes in neurological exam by end of shift  Outcome: Progressing  Flowsheets (Taken 8/1/2024 1104)  No changes in neurological exam by end of shift: Complete frequent neurological assessments  Goal: Learn about and adhere to nutrition recommendations by end of shift  Outcome: Progressing  Flowsheets (Taken 8/1/2024 1104)  Learn about and adhere to nutrition recommendations by end of shift: Ensure/encourage compliance with appropriate diet  Goal: Vital signs within normal range for age by end of shift  Outcome: Progressing  Flowsheets (Taken 8/1/2024 1104)  Vital signs within normal range for age by end of shift: Med administration/monitoring of effect

## 2024-08-01 NOTE — CARE PLAN
Problem: Skin  Goal: Decreased wound size/increased tissue granulation at next dressing change  8/1/2024 1836 by Mecca Garber RN  Outcome: Progressing  Flowsheets (Taken 8/1/2024 1836)  Decreased wound size/increased tissue granulation at next dressing change:   Protective dressings over bony prominences   Promote sleep for wound healing  8/1/2024 1832 by Mecca Garber RN  Outcome: Progressing  Flowsheets (Taken 8/1/2024 1832)  Decreased wound size/increased tissue granulation at next dressing change:   Protective dressings over bony prominences   Promote sleep for wound healing  8/1/2024 1108 by Mecca Garber RN  Outcome: Progressing  Flowsheets (Taken 7/30/2024 1019 by Kim Olvera RN)  Decreased wound size/increased tissue granulation at next dressing change: Protective dressings over bony prominences  8/1/2024 1104 by Mecca Garber RN  Outcome: Progressing  Flowsheets (Taken 7/30/2024 1019 by Kim Olvera RN)  Decreased wound size/increased tissue granulation at next dressing change: Protective dressings over bony prominences  Goal: Participates in plan/prevention/treatment measures  8/1/2024 1836 by Mecca Garber RN  Outcome: Progressing  Flowsheets (Taken 8/1/2024 1836)  Participates in plan/prevention/treatment measures: Elevate heels  8/1/2024 1832 by Mecca Garber RN  Outcome: Progressing  8/1/2024 1108 by Mecca Garber RN  Outcome: Progressing  Flowsheets (Taken 7/30/2024 0646 by Praveen Cristina RN)  Participates in plan/prevention/treatment measures: Elevate heels  8/1/2024 1104 by Mecca Garber RN  Outcome: Progressing  Flowsheets (Taken 7/30/2024 0646 by Praveen Cristina RN)  Participates in plan/prevention/treatment measures: Elevate heels  Goal: Prevent/manage excess moisture  8/1/2024 1836 by Mecca Garber RN  Outcome: Progressing  Flowsheets (Taken 8/1/2024 1836)  Prevent/manage excess moisture:   Moisturize dry skin   Monitor for/manage infection if present   Cleanse  incontinence/protect with barrier cream  8/1/2024 1832 by Mecca Garber RN  Outcome: Progressing  8/1/2024 1108 by Mecca Garber RN  Outcome: Progressing  Flowsheets (Taken 7/31/2024 0857 by Kim Olvera RN)  Prevent/manage excess moisture:   Moisturize dry skin   Monitor for/manage infection if present   Cleanse incontinence/protect with barrier cream  8/1/2024 1104 by Mecca Garber RN  Outcome: Progressing  Flowsheets (Taken 7/31/2024 0857 by Kim Olvera RN)  Prevent/manage excess moisture:   Moisturize dry skin   Monitor for/manage infection if present   Cleanse incontinence/protect with barrier cream  Goal: Prevent/minimize sheer/friction injuries  8/1/2024 1836 by Mecca Garber RN  Outcome: Progressing  Flowsheets (Taken 8/1/2024 1836)  Prevent/minimize sheer/friction injuries:   Turn/reposition every 2 hours/use positioning/transfer devices   HOB 30 degrees or less  8/1/2024 1832 by Mecca Garber RN  Outcome: Progressing  8/1/2024 1108 by Mecca Garber RN  Outcome: Progressing  Flowsheets (Taken 7/30/2024 1019 by Kim Olvera RN)  Prevent/minimize sheer/friction injuries:   Turn/reposition every 2 hours/use positioning/transfer devices   HOB 30 degrees or less  8/1/2024 1104 by Mecca Garber RN  Outcome: Progressing  Flowsheets (Taken 7/30/2024 1019 by Kim Olvera RN)  Prevent/minimize sheer/friction injuries:   Turn/reposition every 2 hours/use positioning/transfer devices   HOB 30 degrees or less  Goal: Promote/optimize nutrition  8/1/2024 1836 by Mecca Garber RN  Outcome: Progressing  Flowsheets (Taken 8/1/2024 1836)  Promote/optimize nutrition:   Consume > 50% meals/supplements   Offer water/supplements/favorite foods  8/1/2024 1832 by Mecca Garber RN  Outcome: Progressing  8/1/2024 1108 by Mecca Garber RN  Outcome: Progressing  Flowsheets (Taken 7/30/2024 0646 by Praveen Cristina RN)  Promote/optimize nutrition: Monitor/record intake including meals  8/1/2024 1104 by  Mecca Garber RN  Outcome: Progressing  Flowsheets (Taken 7/30/2024 0646 by Praveen Cristina RN)  Promote/optimize nutrition: Monitor/record intake including meals  Goal: Promote skin healing  8/1/2024 1836 by Mecca Garber RN  Outcome: Progressing  Flowsheets (Taken 8/1/2024 1836)  Promote skin healing:   Assess skin/pad under line(s)/device(s)   Protective dressings over bony prominences   Turn/reposition every 2 hours/use positioning/transfer devices  8/1/2024 1832 by Mecca Garber RN  Outcome: Progressing  8/1/2024 1108 by Mecca Garber RN  Outcome: Progressing  Flowsheets (Taken 7/31/2024 0857 by Kim Olvera RN)  Promote skin healing:   Assess skin/pad under line(s)/device(s)   Turn/reposition every 2 hours/use positioning/transfer devices  8/1/2024 1104 by Mecca Garber RN  Outcome: Progressing  Flowsheets (Taken 7/31/2024 0857 by Kim Olvera RN)  Promote skin healing:   Assess skin/pad under line(s)/device(s)   Turn/reposition every 2 hours/use positioning/transfer devices

## 2024-08-01 NOTE — PROGRESS NOTES
Vancomycin Dosing by Pharmacy- FOLLOW UP    Edwar Hemphill is a 39 y.o. year old male who Pharmacy has been consulted for vancomycin dosing for endocarditis/endovascular infection. Based on the patient's indication and renal status this patient is being dosed based on a goal pre-HD level of 20-25.     Patient received one time HD this evening. Lvl this morning, 31.1 mg/L.      Visit Vitals  BP 89/52   Pulse 82   Temp 36 °C (96.8 °F) (Temporal)   Resp 11        Lab Results   Component Value Date    CREATININE 3.15 (H) 2024    CREATININE 3.03 (H) 2024    CREATININE 2.39 (H) 2024    CREATININE 2.23 (H) 2024        Patient weight is as follows:   Vitals:    24 0500   Weight: 75.4 kg (166 lb 3.6 oz)       Cultures:  Susceptibility data for the encounter in last 14 days.  Collected Specimen Info Organism Clindamycin Erythromycin Oxacillin Rifampin Tetracycline Trimethoprim/Sulfamethoxazole Vancomycin   24 Fluid from Synovial Methicillin Resistant Staphylococcus aureus (MRSA)  R  R  R   R  S  S   24 Blood culture from Peripheral Venipuncture Staphylococcus haemolyticus  R  R  R   S  R  S   24 Blood culture from Peripheral Venipuncture Staphylococcus aureus          24 Blood culture from Peripheral Venipuncture Staphylococcus aureus          24 Blood culture from Peripheral Venipuncture Methicillin Resistant Staphylococcus aureus (MRSA)  R  R  R  I  R  S  S   24 Blood culture from Peripheral Venipuncture Staphylococcus aureus               I/O last 3 completed shifts:  In: 2458.9 (34.7 mL/kg) [P.O.:600; I.V.:908.9 (12.8 mL/kg); Other:800; IV Piggyback:150]  Out: 2800 (39.5 mL/kg) [Other:2800]  Dosing Weight: 70.9 kg   I/O during current shift:  No intake/output data recorded.    Temp (24hrs), Av.4 °C (97.5 °F), Min:35.9 °C (96.6 °F), Max:37.3 °C (99.1 °F)      Assessment/Plan    D/t patient receiving HD, and indication, will give a one time post-HD dose of  500 mg vancomycin.  The next level will be obtained on 8/2 with am labs. May be obtained sooner if clinically indicated.   Will continue to monitor renal function daily while on vancomycin and order serum creatinine at least every 48 hours if not already ordered.  Follow for continued vancomycin needs, clinical response, and signs/symptoms of toxicity.       Barrie Saldaña, RP

## 2024-08-01 NOTE — PROGRESS NOTES
Edwar Hemphill is a 39 y.o. male on day 10 of admission presenting with Septic shock (Multi).    Subjective   Interval History:  Patient without complaints today.  No c/o left wrist pain or other joint pains.  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  HD cath left in place due to limited options for replacement.            Objective   Range of Vitals (last 24 hours)  Heart Rate:  [74-89]   Temp:  [35.8 °C (96.4 °F)-36.4 °C (97.5 °F)]   Resp:  [11-17]   BP: ()/(47-91)   SpO2:  [90 %-100 %]   Daily Weight  07/25/24 : 75.4 kg (166 lb 3.6 oz)    Body mass index is 24.54 kg/m².    Physical Exam  ONSTITUTIONAL: Chronically ill appearing, NAD, cooperative  SKIN:  No rashes or lesions.  LE feet with thickened nails and dry scaly skin  EYES: PERRLA, EOMI, Sclera anicteric.  No conjunctival injection.  No petechial  or embolic lesions  ENMT: MMM, No oral lesions or thrush.   CVS: RRR, S1 & S2 normal.  No murmurs, rubs or gallops appreciated.  Pedal pulses intact.  RESPIRATORY/CHEST: Clear anterolaterally.  No rales or rhonchi  GI: Soft, NT/ND.   No rebound or guarding  EXT: Left wrist in surgical dressing. Right LE with extensive, scaling skin.    Antibiotics  ceftaroline (Teflaro) IV in 50 mL D5W  vancomycin    Relevant Results  Labs  Results from last 72 hours   Lab Units 07/31/24  0542 07/30/24  2302 07/30/24  0528 07/29/24  2056 07/29/24  0102   WBC AUTO x10*3/uL 21.0* 21.8* 16.7*   < > 11.1   HEMOGLOBIN g/dL 8.4* 9.6* 8.1*   < > 6.6*   HEMATOCRIT % 26.7* 29.7* 24.9*   < > 20.0*   PLATELETS AUTO x10*3/uL 74* 103* 79*   < > 44*   NEUTROS PCT AUTO % 85.9  --   --   --  82.4   LYMPHO PCT MAN %  --   --  9.5  --   --    LYMPHS PCT AUTO % 8.0  --   --   --  10.8   MONO PCT MAN %  --   --  0.9  --   --    MONOS PCT AUTO % 4.6   Progress Notes by Hannah Webster at 02/26/18 08:21 AM     Author:  Hannah Webster Service:  (none) Author Type:  Admin Staff     Filed:  02/26/18 09:44 AM Encounter Date:  2/26/2018 Status:  Signed     :  Hannah Webster (Admin Staff)            /18 ENDO EG  Dx colon [id number]  Called [facility] [phone]  [place]  No pre cert  5000/100/5000  Ref No [id number][DB1.1M]      Revision History        User Key Date/Time User Provider Type Action    > DB1.1 02/26/18 09:44 AM Hannah Webster Admin Staff Sign    M - Manual             --   --   --  3.7   EOSINO PCT MAN %  --   --  0.0  --   --    EOS PCT AUTO % 0.2  --   --   --  0.2    < > = values in this interval not displayed.     Results from last 72 hours   Lab Units 07/31/24 0542 07/30/24 2302 07/30/24  0528   SODIUM mmol/L 136 135* 135*   POTASSIUM mmol/L 4.7 5.0 4.1   CHLORIDE mmol/L 101 100 101   CO2 mmol/L 24 21 27   BUN mg/dL 24* 22 17   CREATININE mg/dL 3.15* 3.03* 2.39*   GLUCOSE mg/dL 32* 66* 107*   CALCIUM mg/dL 8.5* 9.0 8.3*   ANION GAP mmol/L 16 19 11   EGFR mL/min/1.73m*2 25* 26* 34*   PHOSPHORUS mg/dL 5.2* 5.2* 4.2     Results from last 72 hours   Lab Units 07/31/24 0542 07/30/24 2302 07/30/24 0528 07/29/24  2101 07/29/24  0302   ALK PHOS U/L 189*  --  191*  --  188*   BILIRUBIN TOTAL mg/dL 0.9  --  0.8  --  0.6   PROTEIN TOTAL g/dL 5.6*  --  5.5*  --  5.1*   ALT U/L 25  --  35  --  52   AST U/L 16  --  21  --  59*   ALBUMIN g/dL 2.4* 2.9* 2.5*   < > 2.4*    < > = values in this interval not displayed.     Estimated Creatinine Clearance: 31.2 mL/min (A) (by C-G formula based on SCr of 3.15 mg/dL (H)).  C-Reactive Protein   Date Value Ref Range Status   07/27/2024 6.44 (H) <1.00 mg/dL Final     CRP   Date Value Ref Range Status   07/24/2023 8.04 (A) mg/dL Final     Comment:     REF VALUE  < 1.00     07/24/2023 CANCELED       Comment:     Result canceled by the ancillary.     Microbiology  Susceptibility data from last 14 days.  Collected Specimen Info Organism Clindamycin Erythromycin Oxacillin Rifampin Tetracycline Trimethoprim/Sulfamethoxazole Vancomycin   07/27/24 Fluid from Synovial Methicillin Resistant Staphylococcus aureus (MRSA)  R  R  R   R  S  S   07/27/24 Blood culture from Peripheral Venipuncture Staphylococcus haemolyticus  R  R  R   S  R  S   07/25/24 Blood culture from Peripheral Venipuncture Staphylococcus aureus          07/25/24 Blood culture from Peripheral Venipuncture Staphylococcus aureus          07/22/24 Blood culture from Peripheral Venipuncture  Methicillin Resistant Staphylococcus aureus (MRSA)  R  R  R  I  R  S  S   07/22/24 Blood culture from Peripheral Venipuncture Staphylococcus aureus              Imaging  === Results for orders placed during the hospital encounter of 07/22/24 ===    XR chest 1 view [[id number]] 07/31/2024    Status: Normal  1.  Mildly increased perihilar/interstitial lung markings with hazy  bibasilar airspace opacities. Recommend clinical correlation as  findings could reflect developing interstitial/pulmonary edema.  Underlying infectious process can not definitively be excluded.  2. Persistent small bilateral pleural effusions and unchanged linear  atelectasis of the left lung base.    I personally reviewed the images/study and I agree with the findings  as stated by resident Neftaly Harkins. This study was interpreted  at [facility], [place], [place].    MACRO:  None    Signed by: Eugene Romero 7/31/2024 8:44 AM  Dictation workstation:   [id number]       Assessment/Plan   39 y.o. male with PMH of ESRD on HD (MWF, right femoral line), HFrEF (EF 35-40% 5/2024), T2DM, and multiple hospitalizations for L AKA infection and MRSA bacteremia who presented from SNF with hypotension. Now with blood cultures growing Methicillin-resistant Staphylococcus aureus.       His current, right femoral dialysis line was placed on 5/24/24 as a fresh stick after line holiday from 5/19.  Clearance of bacteremia  had mot been confirmed before replacement. Documented clearance of bacteremia at that time was 5/29/24.  Blood cx 7/22 with MRSA.      TTE 7/23 with thickened TV and mobile densities associated with the TV with severe TR, not seen previously (TTE 5/14/24 with trivial TR and normal TV), suspicious for endocarditis.  Currently receiving Vancomycin and Ceftaroline for the Methicillin-resistant Staphylococcus aureus.     POLI 7/24 with EF 40 and global hypokinesis, large vegetation encasing TDC with involvement and  "destruction of septal leaflet of TV and severe TR. Small mobile echodensity on aortic valve, concerning for vegetation vs degenerative change. Moderate patent foramen ovale.      7/27: Left wrist washout.  Culture positive for MRSA.  Last day of positive blood cultures    He is in a precarious situation as he has little to no options for alternative sites due to extensive thrombus load despite anticoagulation.  It is quite possible that he has a \"septic thrombosis\" situation as well which contributes to reseeding of his blood stream, though this would be difficult to confirm as his bacteremias clear when lines are removed.     7/30/34:  Aborted Vegectomy. HD catheter not removed    Worsening leukocytosis since 7/29/24.  No obvious source on examination.     Recommendations:     Continue Vancomycin and Ceftaroline  Need to discuss vascular access options with nephrology and vascular.  C     I spent 35 minutes in the professional and overall care of this patient.      Mitra Maynard MD  "

## 2024-08-01 NOTE — CARE PLAN
Problem: Pain - Adult  Goal: Verbalizes/displays adequate comfort level or baseline comfort level  Outcome: Progressing  Flowsheets (Taken 7/29/2024 0930 by Long Eason RN)  Verbalizes/displays adequate comfort level or baseline comfort level:   Encourage patient to monitor pain and request assistance   Administer analgesics based on type and severity of pain and evaluate response   Assess pain using appropriate pain scale   Implement non-pharmacological measures as appropriate and evaluate response   Consider cultural and social influences on pain and pain management     Problem: Safety - Adult  Goal: Free from fall injury  Outcome: Progressing  Flowsheets (Taken 7/29/2024 0930 by Long Eason RN)  Free from fall injury: Instruct family/caregiver on patient safety     Problem: Chronic Conditions and Co-morbidities  Goal: Patient's chronic conditions and co-morbidity symptoms are monitored and maintained or improved  Outcome: Progressing  Flowsheets (Taken 8/1/2024 1104)  Care Plan - Patient's Chronic Conditions and Co-Morbidity Symptoms are Monitored and Maintained or Improved:   Monitor and assess patient's chronic conditions and comorbid symptoms for stability, deterioration, or improvement   Collaborate with multidisciplinary team to address chronic and comorbid conditions and prevent exacerbation or deterioration   Update acute care plan with appropriate goals if chronic or comorbid symptoms are exacerbated and prevent overall improvement and discharge     Problem: Skin  Goal: Decreased wound size/increased tissue granulation at next dressing change  Outcome: Progressing  Flowsheets (Taken 7/30/2024 1019 by Kim Olvera RN)  Decreased wound size/increased tissue granulation at next dressing change: Protective dressings over bony prominences  Goal: Participates in plan/prevention/treatment measures  Outcome: Progressing  Flowsheets (Taken 7/30/2024 0646 by Praveen Cristina RN)  Participates in  plan/prevention/treatment measures: Elevate heels  Goal: Prevent/manage excess moisture  Outcome: Progressing  Flowsheets (Taken 7/31/2024 0857 by Kim Olvera RN)  Prevent/manage excess moisture:   Moisturize dry skin   Monitor for/manage infection if present   Cleanse incontinence/protect with barrier cream  Goal: Prevent/minimize sheer/friction injuries  Outcome: Progressing  Flowsheets (Taken 7/30/2024 1019 by Kim Olvera RN)  Prevent/minimize sheer/friction injuries:   Turn/reposition every 2 hours/use positioning/transfer devices   HOB 30 degrees or less  Goal: Promote/optimize nutrition  Outcome: Progressing  Flowsheets (Taken 7/30/2024 0646 by Praveen Cristina RN)  Promote/optimize nutrition: Monitor/record intake including meals  Goal: Promote skin healing  Outcome: Progressing  Flowsheets (Taken 7/31/2024 0857 by Kim Olvera RN)  Promote skin healing:   Assess skin/pad under line(s)/device(s)   Turn/reposition every 2 hours/use positioning/transfer devices     Problem: Diabetes  Goal: Maintain electrolyte levels within acceptable range throughout shift  Outcome: Progressing  Flowsheets (Taken 8/1/2024 1104)  Maintain electrolyte levels within acceptable range throughout shift: Med administration/monitoring of effect  Goal: Maintain glucose levels >70mg/dl to <250mg/dl throughout shift  Outcome: Progressing  Flowsheets (Taken 8/1/2024 1104)  Maintain glucose levels >70mg/dl to <250mg/dl throughout shift: Med administration/monitoring of effect  Goal: No changes in neurological exam by end of shift  Outcome: Progressing  Flowsheets (Taken 8/1/2024 1104)  No changes in neurological exam by end of shift: Complete frequent neurological assessments  Goal: Learn about and adhere to nutrition recommendations by end of shift  Outcome: Progressing  Flowsheets (Taken 8/1/2024 1104)  Learn about and adhere to nutrition recommendations by end of shift: Ensure/encourage compliance with appropriate diet  Goal:  Vital signs within normal range for age by end of shift  Outcome: Progressing  Flowsheets (Taken 8/1/2024 1104)  Vital signs within normal range for age by end of shift: Med administration/monitoring of effect

## 2024-08-01 NOTE — PROGRESS NOTES
"Medical Intensive Care - Daily Progress Note   Subjective    Edwar Hemphill is a 39 y.o. year old male patient admitted on 7/22/2024 with following ICU needs: septic shock required pressors, infective endocarditis     Interval History:  Patient with an episode of hypotension to 59/34 responded well to 500 ml bolus, up to 100's systolic's. Patient with repeat drop in BP later in the afternoon, restarted Levo. Patient without extensive symptoms during drop in BP only reports mild dizziness. Patient denies chest pain, SOB, changes in mentation, lower extremity swelling, fevers, chills, and palpitations.     Overnight   NAEON.        Meds    Scheduled medications  atorvastatin, 40 mg, oral, Nightly  B complex-vitamin C-folic acid, 1 capsule, oral, Daily  ceftaroline, 200 mg, intravenous, q8h  cholecalciferol, 50,000 Units, oral, Once per day on Monday Thursday  epoetin tyson or biosimilar, 10,000 Units, subcutaneous, Once per day on Monday Wednesday Friday  folic acid, 1 mg, oral, Daily  insulin glargine, 5 Units, subcutaneous, Nightly  insulin lispro, 0-10 Units, subcutaneous, TID  levothyroxine, 125 mcg, oral, Daily before breakfast  melatonin, 6 mg, oral, Nightly  midodrine, 15 mg, oral, q8h  nystatin, 1 Application, Topical, BID  [START ON 8/2/2024] pantoprazole, 40 mg, oral, Daily before breakfast  thiamine, 200 mg, oral, Daily      Continuous medications  [Held by provider] heparin, 0-4,500 Units/hr, Last Rate: Stopped (08/01/24 1240)  norepinephrine, 0.01-1 mcg/kg/min (Dosing Weight), Last Rate: 0.05 mcg/kg/min (08/01/24 1842)      PRN medications  PRN medications: acetaminophen **OR** acetaminophen **OR** acetaminophen, dextrose, dextrose, diphenhydrAMINE, glucagon, glucagon, heparin, heparin, lidocaine, oxyCODONE, vancomycin     Objective    Blood pressure 78/50, pulse 72, temperature 36.1 °C (97 °F), temperature source Temporal, resp. rate 11, height 1.753 m (5' 9.02\"), weight 75.4 kg (166 lb 3.6 oz), SpO2 96%. "     Physical Exam   Constitutional:       Appearance: Normal appearance.   HENT:      Mouth/Throat:      Mouth: Mucous membranes are moist.   Cardiovascular:      Rate and Rhythm: Regular rate. Diastolic murmur on LSB and pulmonic area      Heart sounds: Normal heart sounds.   Pulmonary:      Effort: Pulmonary effort is normal.      Breath sounds: Normal breath sounds.   Abdominal:      Palpations: Abdomen is soft.   Musculoskeletal:      Comments: Left foot amputated above the knee  Right hand amputated above elbow  Posterior calcaneous wound has yellow discharge   Skin:     General: Skin is dry.   Neurological:      General: No focal deficit present.      Mental Status: He is oriented to person, place, and time.   Psychiatric:         Behavior: Behavior normal.         Thought Content: Thought content normal.         Judgment: Judgment normal.        Intake/Output Summary (Last 24 hours) at 8/1/2024 1918  Last data filed at 8/1/2024 1653  Gross per 24 hour   Intake 981.33 ml   Output --   Net 981.33 ml     Labs:   Results from last 72 hours   Lab Units 08/01/24  0542 07/31/24  0542 07/30/24  2302   SODIUM mmol/L 133* 136 135*   POTASSIUM mmol/L 4.6 4.7 5.0   CHLORIDE mmol/L 101 101 100   CO2 mmol/L 26 24 21   BUN mg/dL 15 24* 22   CREATININE mg/dL 2.28* 3.15* 3.03*   GLUCOSE mg/dL 293* 32* 66*   CALCIUM mg/dL 7.9* 8.5* 9.0   ANION GAP mmol/L 11 16 19   EGFR mL/min/1.73m*2 36* 25* 26*   PHOSPHORUS mg/dL 4.3 5.2* 5.2*      Results from last 72 hours   Lab Units 08/01/24  1243 08/01/24  0743 08/01/24  0542 07/31/24  0542   WBC AUTO x10*3/uL 11.8* 10.1 10.6 21.0*   HEMOGLOBIN g/dL 7.8* 7.7* 7.8* 8.4*   HEMATOCRIT % 24.2* 23.1* 23.3* 26.7*   PLATELETS AUTO x10*3/uL 52* 49* 44* 74*   NEUTROS PCT AUTO %  --  77.3  --  85.9   LYMPHO PCT MAN % 6.9  --  5.4  --    LYMPHS PCT AUTO %  --  15.4  --  8.0   MONO PCT MAN % 2.6  --  0.9  --    MONOS PCT AUTO %  --  5.2  --  4.6   EOSINO PCT MAN % 0.0  --  0.9  --    EOS PCT AUTO %   --  0.5  --  0.2        Micro/ID:     Lab Results   Component Value Date    BLOODCULT No growth at 1 day 07/31/2024    BLOODCULT No growth at 1 day 07/31/2024         Assessment and Plan     Assessment:  Edwar Hemphill is a 39 y.o. male with PMHx significant for ESRD (MWF  via right femoral line), NICM,  HFrEF (LVEF 35-40% on 5/2024 POLI), anemia, DM2, Left AKA, RUE amputation, HTN, PVD, GERD.  Admitted  with septic shock required pressors and infective endocarditis with large TV vegetation, w/ bcx growing GPCs with staph aureus ( with history MRSA bacteremia before). Started on vanc/zosyn, fluid resuscitated, ID, cardiology and vascular medicine consulted. On TTE showed TV vegetation and possible source could be femoral catheter with attached vegetations, continued on CVVH based on nephrology consult. Changed zosyn to ceftaroline on 7/23. We were not able to take off the line as a source of infection given his DVTs in subclavian, LIJ, b/l innominate occlusion and complete occlusion of intrahepatic IVC its hard to find another access at this time. IR, cardiology, ID, Vascular medicine are on board. he also had  Acinetobacter baumanni positive on his wound culture. POLI has done 7/24/24 showed large vegetation on the catheter measures at least 1.9cm x 2.5 cm with  involvement of the TV with likely destruction of the septal leaflet of the TV and resultant severe TR.  He was previously on CVVH but will start HD 7/31/24. CTA showed Severely attenuated superior vena cava, bilateral brachiocephalic, subclavian and internal jugular veins with the calcifications in the brachiocephalic veins with extensive collaterals in the chest wall likely representing chronic thrombosis. Patient had vegectomy aborted due to to concerns for infected thrombus in the IVC and potential incomplete treatment without removal of the TDC.  Recent L wrist synovial fluid was positive for MRSA he is now s/p washout currently on Vancomycin and  Ceftaroline.     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   -positive staph aureus bacteremia   -positive second blood culture with Gram positive cocci, clusters, on 7/25  - Ceftaroline started on 7/23 and Vancomycin started on 7/22   -palliative care consulted and patient said he wants to receive treatments  - 7/27 Blood cultures positive for Staph Haemolyticus  - Wrist synovial fluid culture positive for MRSA, now s/p L wrist debridement/washout 7/29  - Levo restarted at 0.01 7/29 with MAP goal >60     Plan:  - vegectomy aborted due to to concerns for infected thrombus in the IVC and potential incomplete treatment without removal of the TDC  - Currently of of levo  MAP goal >60 (Currently 0.01)   - Currently on ceftaroline and vancomycin   - Awaiting recommendations from Dr. Dennis's Cardiology team   -Blood cultures collected 7/29 NGTD, new cultures obtained 7/31            #HFrecEF   - last EF 39% (lowest EF 26%)   - Presumed ICM          # hx of paroxysmal Afib  #Multiple line-related DVTs   - CHADSVASC 6 (DM, PAD, TIA, HTN, HF)   - b/l subclavian DVT, L IJ DVT, b/l innominate occlusion, complete occlusion of intrahepatic IVC s/p angioplasty   - priorly on Eliquis however recent GI bleed 6/15 at [facility] , eliquis was held and patient was planned for heparin trial but left ama prior to trial   - not a candidate for IVC filter given femoral TDC   - last DVT US w/ resolution of RLE DVT   -discontinued heparin due to low plts   -PF4 with normal  activity       Plan:  -Will start patient on DVT prophylaxis  - Will monitor H&H and platelet counts           PULMONARY:  NAVI  CXR at bl     RENAL/GENITOURINARY:  #ESRD MWF thru right femoral line   -  iHD TTS since 2016, anuric   - RUE AVG c/b infection s/p RUE amputation above elbow 2021   - multiple TDCs (including iliolumbar)   - femoral TDC recently placed at  (14.5 Japanese and 42 cm long)  - lokelma 5 MWF Held iso of hypokalemia  - home sevelmer held  Plan   -Nephrology is following patient currently receiving HD   - IR consulted considering replacement of thrombotic right femoral line       GASTROENTEROLOGY:  #Esophagitis   #GERD  ::EGD on 6/8 clipped two foci of active esophageal bleeding   Plan:  -cw home PPI BID        ENDOCRINOLOGY:  #hypothyroidism  -last TSH 1.9  Plan:  -cw home levo 125     #T1DM  -home regiment : 8u glargine, SSI  -Hba1c 7.3  Plan:   - Decreased glargine from  10 u --> 5u with SSI #2       HEMATOLOGY:  #Chronic anemia  #Anemia of chronic disease  > Hgb 6.6 7/29 transfused 1 unit --> 8.4 s/p tx--> 8/1 (7/30)    Plan:  - c/w home epo m/w/f  - trend cbc   - if Hb<7 transfuse      #Thrombocytopenia  -no concern for KALLIE now, possibly due to active infection   -PF4 activity normal  -Fibrinogen 201, INR 1.4, aPTT 34, PT 15  -plt down trending to 44 7/29 --> 84 s/p 1 unit platelets--> 79 (7/30)--> 74(7/31)   Plan:  -FU on CBC         MUSCULOSKELETAL/ SKIN:  NAVI     INFECTIOUS DISEASE:  #Sepsis with line as Likely  source  #endocarditis   # Septic arthritis L wrist   >(7/27) Blood cultures positive for Staph Haemolyticus  >Wrist synovial fluid culture positive for MRSA, now s/p L wrist debridement/washout (7/29)  >has previously needed double coverage for 4 weeks with van/dapto to cover infection. But developed lung toxicity 2/2 dapto. And switched to linezolid.    Plan:  - Infectious Disease is following the patient will reach out for recommendations as patient has increasing white count    - Continue Ceftaroline & Vancomycin                 ICU Check List      FEN:  Fluids: restricted   Electrolytes: k>4 mg>2 caution as ESRD  Nutrition: renal diet  DVT ppx: none  GI ppx: PPI  Bowel care: Miralax prn  Access:  PIV, Femoral line        Social:  Code: Full Code    NOK:   Extended Emergency Contact Information  Primary Emergency Contact: Shanthi Hemphill  Mobile Phone: [phone]  Relation: Parent  Secondary Emergency Contact: Terra Scott  Mobile Phone: [phone]       Don Hernández MD   08/01/24 at 7:18 PM   PGY-1 Internal Medicine Resident     Disclaimer: Documentation completed with the information available at the time of input. The times in the chart may not be reflective of actual patient care times, interventions, or procedures. Documentation occurs after the physical care of the patient.

## 2024-08-01 NOTE — SIGNIFICANT EVENT
Chart reviewed today, noted that cardiology states risks of vegectomy outweigh benefits. Palliative team can be available to assist with goals of care as needed.     Cori Woodward DNP, CNP

## 2024-08-01 NOTE — PROGRESS NOTES
SOCIAL WORK NOTE   -ICU Treatment Plan: Planned for transfer, but issues with low BP.   -Support System:  mother is POA  -Planned Disposition: Return to [facility] and [facility]  -Additional information:  n/a

## 2024-08-01 NOTE — PROGRESS NOTES
Subjective     Interval History: Edwar Hemphill has no new complaints    Medications    Current Facility-Administered Medications:     acetaminophen (Tylenol) tablet 650 mg, 650 mg, oral, q4h PRN, 650 mg at 08/01/24 0826 **OR** acetaminophen (Tylenol) oral liquid 650 mg, 650 mg, nasogastric tube, q4h PRN **OR** acetaminophen (Tylenol) suppository 650 mg, 650 mg, rectal, q4h PRN, Blaine Gonzalez MD    atorvastatin (Lipitor) tablet 40 mg, 40 mg, oral, Nightly, Blaine Gonzalez MD, 40 mg at 07/31/24 2013    B complex-vitamin C-folic acid (Nephrocaps) capsule 1 capsule, 1 capsule, oral, Daily, Blaine Gonzalez MD, 1 capsule at 08/01/24 0826    ceftaroline (Teflaro) 200 mg in dextrose 5% 50 mL IV, 200 mg, intravenous, q8h, Praveen Koo MD PhD, Stopped at 08/01/24 0927    cholecalciferol (Vitamin D-3) capsule 50,000 Units, 50,000 Units, oral, Once per day on Monday Thursday, Blaine Gonzalez MD, 50,000 Units at 08/01/24 0826    dextrose 50 % injection 12.5 g, 12.5 g, intravenous, q15 min PRN, Blaine Gonzalez MD, 12.5 g at 07/31/24 0029    dextrose 50 % injection 25 g, 25 g, intravenous, q15 min PRN, Blaine Gonzalez MD, 25 g at 07/31/24 1730    diphenhydrAMINE (BENADryl) capsule 25 mg, 25 mg, oral, q6h PRN, Blaine Gonzalez MD, 25 mg at 07/24/24 1525    epoetin tyson (Epogen,Procrit) injection 10,000 Units, 10,000 Units, subcutaneous, Once per day on Monday Wednesday Friday, Blaine Gonzalez MD, 10,000 Units at 07/31/24 0826    folic acid (Folvite) tablet 1 mg, 1 mg, oral, Daily, Blaine Gonzalez MD, 1 mg at 08/01/24 0826    glucagon (Glucagen) injection 1 mg, 1 mg, intramuscular, q15 min PRN, Blaine Gonzalez MD    glucagon (Glucagen) injection 1 mg, 1 mg, intramuscular, q15 min PRN, Blaine Gonzalez MD    heparin 1,000 unit/mL injection 1,000 Units, 1,000 Units, intra-catheter, PRN, Blaine Gonzalez MD    heparin 1,000 unit/mL injection 1,000 Units, 1,000 Units, intra-catheter, PRN, Blaine Gonzalez MD, 1,000 Units at 07/26/24 1400     [Held by provider] heparin 25,000 Units in dextrose 5% 250 mL (100 Units/mL) infusion (premix), 0-4,500 Units/hr, intravenous, Continuous, Gerardo Foreman MD, Stopped at 08/01/24 1240    insulin glargine (Lantus) injection 5 Units, 5 Units, subcutaneous, Nightly, Gerardo Foreman MD    insulin lispro (HumaLOG) injection 0-10 Units, 0-10 Units, subcutaneous, TID, Blaine Gonzalez MD, 6 Units at 08/01/24 0827    lactated Ringer's bolus 500 mL, 500 mL, intravenous, Once, Heydi Quiñones MD, Last Rate: 250 mL/hr at 08/01/24 1250, 500 mL at 08/01/24 1250    levothyroxine (Synthroid, Levoxyl) tablet 125 mcg, 125 mcg, oral, Daily before breakfast, Blaine Gonzalez MD, 125 mcg at 08/01/24 0732    lidocaine (LMX) 4 % cream, , Topical, 4x daily PRN, Blaine Gonzalez MD, Given at 07/24/24 0828    melatonin tablet 6 mg, 6 mg, oral, Nightly, Blaine Gonzalez MD, 6 mg at 07/31/24 2013    midodrine (Proamatine) tablet 10 mg, 10 mg, oral, q8h, Mary Hayes DO, 10 mg at 08/01/24 0732    nystatin (Mycostatin) 100,000 unit/gram powder 1 Application, 1 Application, Topical, BID, Blaine Gonzalez MD, 1 Application at 08/01/24 0900    oxyCODONE (Roxicodone) immediate release tablet 5 mg, 5 mg, oral, q6h PRN, Don Hernández MD, 5 mg at 08/01/24 0825    [START ON 8/2/2024] pantoprazole (ProtoNix) EC tablet 40 mg, 40 mg, oral, Daily before breakfast, Gerardo Foreman MD    thiamine (Vitamin B-1) tablet 200 mg, 200 mg, oral, Daily, Blaine Gonzalez MD, 200 mg at 08/01/24 0826    vancomycin (Vancocin) pharmacy to dose - pharmacy monitoring, , miscellaneous, Daily PRN, Blaine Gonzalez MD    Objective     Physical Exam  Heart S1 S2 RRR, Lungs CTA, no edema      Vital signs in last 24 hours:  Temp:  [35.8 °C (96.4 °F)-36.4 °C (97.5 °F)] 36 °C (96.8 °F)  Heart Rate:  [72-89] 72  Resp:  [11-23] 11  BP: ()/(34-91) 90/55       Intake/Output last 3 shifts:  I/O last 3 completed shifts:  In: 2473.8 (34.9 mL/kg) [P.O.:600; I.V.:973.8 (13.7 mL/kg);  Other:800; IV Piggyback:100]  Out: 2800 (39.5 mL/kg) [Other:2800]  Dosing Weight: 70.9 kg     Labs:  Results from last 7 days   Lab Units 08/01/24  1243   WBC AUTO x10*3/uL 11.8*   RBC AUTO x10*6/uL 2.64*   HEMOGLOBIN g/dL 7.8*   HEMATOCRIT % 24.2*     Results from last 7 days   Lab Units 08/01/24  0542   SODIUM mmol/L 133*   POTASSIUM mmol/L 4.6   CHLORIDE mmol/L 101   CO2 mmol/L 26   BUN mg/dL 15   CREATININE mg/dL 2.28*   CALCIUM mg/dL 7.9*   PHOSPHORUS mg/dL 4.3   MAGNESIUM mg/dL 2.03   BILIRUBIN TOTAL mg/dL 0.7   ALT U/L 22   AST U/L 24       Assessment/Plan     Principal Problem:    Septic shock (Multi)  Active Problems:    Acute infective endocarditis (HHS-HCC)    Tricuspid valve vegetation (HHS-HCC)    Staphylococcal arthritis of left wrist (Multi)    Infection of left wrist (Multi)    Edwar Hemphill is a  39 y.o.  Year old , with PMHx of ESRD on HD (right femoral line), HFrEF  T2DM, PVD (s/p left AKA and right forearm amputation), multiple line-related DVT (RIJ, LIJ, RUE, LUE, RLE, LLE; on Eliquis prior to recent upper GI bleed 6/15) presenting from [facility] due to hypotension prior to dialysis. Admitted to MICU for septic shock 2/2 mrsa bacteremia. Nephrology consulted for dialysis needs.      Note that cardiology unable to perform any invasive procedures due to overall status.       #ESRD on HD MWF via R femoral TDC  #Hx of multiple HD catheter infections, mrsa bacteremia   -HD at [facility], nephrologist Dr. Licona   Started on CVVH 7/23 due to hemodynamic instability, improved  #Septic shock 2/2 staph aureus bacteremia  #TV infective endocarditis   -vancomycin, ceftaroline   -Bcx 7/25 staph, 7/27 - staph heamolyticus. Bcx 7/29 pending         RECOMMENDATIONS:  -Hemodynamics stable on midodrine.   Plan HD 8/2  -Please discuss with IR if patient has any other options for placement of dialysis catheter if this femoral catheter is removed     Karishma Mcmanus MD  8/1/2024  2:26 PM

## 2024-08-01 NOTE — CARE PLAN
Problem: Pain - Adult  Goal: Verbalizes/displays adequate comfort level or baseline comfort level  Outcome: Progressing     Problem: Safety - Adult  Goal: Free from fall injury  Outcome: Progressing     Problem: Fall/Injury  Goal: Not fall by end of shift  Outcome: Progressing  Goal: Be free from injury by end of the shift  Outcome: Progressing  Goal: Verbalize understanding of personal risk factors for fall in the hospital  Outcome: Progressing   The patient's goals for the shift include      The clinical goals for the shift include VS within MD ordered parameters    Over the shift, the patient did not make progress toward the following goals. Barriers to progression include chronic illness. Recommendations to address these barriers include per MD order.

## 2024-08-02 ENCOUNTER — APPOINTMENT (OUTPATIENT)
Dept: CARDIOLOGY | Facility: HOSPITAL | Age: 39
End: 2024-08-02
Payer: COMMERCIAL

## 2024-08-02 LAB
ALBUMIN SERPL BCP-MCNC: 2.4 G/DL (ref 3.4–5)
ALP SERPL-CCNC: 218 U/L (ref 33–120)
ALT SERPL W P-5'-P-CCNC: 19 U/L (ref 10–52)
ANION GAP SERPL CALC-SCNC: 17 MMOL/L (ref 10–20)
AST SERPL W P-5'-P-CCNC: 21 U/L (ref 9–39)
BACTERIA BLD CULT: NORMAL
BACTERIA BLD CULT: NORMAL
BASOPHILS # BLD AUTO: 0.07 X10*3/UL (ref 0–0.1)
BASOPHILS NFR BLD AUTO: 0.6 %
BILIRUB SERPL-MCNC: 0.6 MG/DL (ref 0–1.2)
BUN SERPL-MCNC: 22 MG/DL (ref 6–23)
CALCIUM SERPL-MCNC: 8.2 MG/DL (ref 8.6–10.6)
CHLORIDE SERPL-SCNC: 98 MMOL/L (ref 98–107)
CO2 SERPL-SCNC: 22 MMOL/L (ref 21–32)
CREAT SERPL-MCNC: 3.23 MG/DL (ref 0.5–1.3)
EGFRCR SERPLBLD CKD-EPI 2021: 24 ML/MIN/1.73M*2
EJECTION FRACTION APICAL 4 CHAMBER: 38.1
EJECTION FRACTION: 43 %
EOSINOPHIL # BLD AUTO: 0.05 X10*3/UL (ref 0–0.7)
EOSINOPHIL NFR BLD AUTO: 0.4 %
ERYTHROCYTE [DISTWIDTH] IN BLOOD BY AUTOMATED COUNT: 21.4 % (ref 11.5–14.5)
ERYTHROCYTE [DISTWIDTH] IN BLOOD BY AUTOMATED COUNT: 22.4 % (ref 11.5–14.5)
FUNGUS SPEC CULT: NORMAL
FUNGUS SPEC CULT: NORMAL
FUNGUS SPEC FUNGUS STN: NORMAL
FUNGUS SPEC FUNGUS STN: NORMAL
GLUCOSE BLD MANUAL STRIP-MCNC: 125 MG/DL (ref 74–99)
GLUCOSE BLD MANUAL STRIP-MCNC: 130 MG/DL (ref 74–99)
GLUCOSE BLD MANUAL STRIP-MCNC: 324 MG/DL (ref 74–99)
GLUCOSE BLD MANUAL STRIP-MCNC: 54 MG/DL (ref 74–99)
GLUCOSE BLD MANUAL STRIP-MCNC: 55 MG/DL (ref 74–99)
GLUCOSE BLD MANUAL STRIP-MCNC: 79 MG/DL (ref 74–99)
GLUCOSE SERPL-MCNC: 333 MG/DL (ref 74–99)
HCT VFR BLD AUTO: 26.5 % (ref 41–52)
HCT VFR BLD AUTO: 26.9 % (ref 41–52)
HGB BLD-MCNC: 8.8 G/DL (ref 13.5–17.5)
HGB BLD-MCNC: 8.9 G/DL (ref 13.5–17.5)
HYPOCHROMIA BLD QL SMEAR: NORMAL
IMM GRANULOCYTES # BLD AUTO: 0.13 X10*3/UL (ref 0–0.7)
IMM GRANULOCYTES NFR BLD AUTO: 1.1 % (ref 0–0.9)
LEFT VENTRICLE INTERNAL DIMENSION DIASTOLE: 4.1 CM (ref 3.5–6)
LEFT VENTRICULAR OUTFLOW TRACT DIAMETER: 1.8 CM
LYMPHOCYTES # BLD AUTO: 1.53 X10*3/UL (ref 1.2–4.8)
LYMPHOCYTES NFR BLD AUTO: 13.1 %
MAGNESIUM SERPL-MCNC: 2.03 MG/DL (ref 1.6–2.4)
MCH RBC QN AUTO: 29.6 PG (ref 26–34)
MCH RBC QN AUTO: 29.7 PG (ref 26–34)
MCHC RBC AUTO-ENTMCNC: 33.1 G/DL (ref 32–36)
MCHC RBC AUTO-ENTMCNC: 33.2 G/DL (ref 32–36)
MCV RBC AUTO: 89 FL (ref 80–100)
MCV RBC AUTO: 90 FL (ref 80–100)
MONOCYTES # BLD AUTO: 0.71 X10*3/UL (ref 0.1–1)
MONOCYTES NFR BLD AUTO: 6.1 %
NEUTROPHILS # BLD AUTO: 9.18 X10*3/UL (ref 1.2–7.7)
NEUTROPHILS NFR BLD AUTO: 78.7 %
NRBC BLD-RTO: 0.2 /100 WBCS (ref 0–0)
NRBC BLD-RTO: 0.2 /100 WBCS (ref 0–0)
PHOSPHATE SERPL-MCNC: 5.2 MG/DL (ref 2.5–4.9)
PLATELET # BLD AUTO: 41 X10*3/UL (ref 150–450)
PLATELET # BLD AUTO: 53 X10*3/UL (ref 150–450)
POLYCHROMASIA BLD QL SMEAR: NORMAL
POTASSIUM SERPL-SCNC: 4.9 MMOL/L (ref 3.5–5.3)
PROT SERPL-MCNC: 5.2 G/DL (ref 6.4–8.2)
RBC # BLD AUTO: 2.97 X10*6/UL (ref 4.5–5.9)
RBC # BLD AUTO: 3 X10*6/UL (ref 4.5–5.9)
RBC MORPH BLD: NORMAL
RIGHT VENTRICLE FREE WALL PEAK S': 9.14 CM/S
RIGHT VENTRICLE PEAK SYSTOLIC PRESSURE: 46.4 MMHG
SODIUM SERPL-SCNC: 132 MMOL/L (ref 136–145)
TARGETS BLD QL SMEAR: NORMAL
TRICUSPID ANNULAR PLANE SYSTOLIC EXCURSION: 1.6 CM
UFH PPP CHRO-ACNC: 0.3 IU/ML
UFH PPP CHRO-ACNC: <0.1 IU/ML
VANCOMYCIN SERPL-MCNC: 22.9 UG/ML (ref 5–20)
WBC # BLD AUTO: 11.7 X10*3/UL (ref 4.4–11.3)
WBC # BLD AUTO: 12.1 X10*3/UL (ref 4.4–11.3)

## 2024-08-02 PROCEDURE — 2500000004 HC RX 250 GENERAL PHARMACY W/ HCPCS (ALT 636 FOR OP/ED)

## 2024-08-02 PROCEDURE — 80202 ASSAY OF VANCOMYCIN: CPT

## 2024-08-02 PROCEDURE — 93321 DOPPLER ECHO F-UP/LMTD STD: CPT | Performed by: INTERNAL MEDICINE

## 2024-08-02 PROCEDURE — 80053 COMPREHEN METABOLIC PANEL: CPT

## 2024-08-02 PROCEDURE — 2500000002 HC RX 250 W HCPCS SELF ADMINISTERED DRUGS (ALT 637 FOR MEDICARE OP, ALT 636 FOR OP/ED): Performed by: NURSE PRACTITIONER

## 2024-08-02 PROCEDURE — 93325 DOPPLER ECHO COLOR FLOW MAPG: CPT | Performed by: INTERNAL MEDICINE

## 2024-08-02 PROCEDURE — 2500000001 HC RX 250 WO HCPCS SELF ADMINISTERED DRUGS (ALT 637 FOR MEDICARE OP)

## 2024-08-02 PROCEDURE — 36415 COLL VENOUS BLD VENIPUNCTURE: CPT

## 2024-08-02 PROCEDURE — 85520 HEPARIN ASSAY: CPT

## 2024-08-02 PROCEDURE — 84100 ASSAY OF PHOSPHORUS: CPT

## 2024-08-02 PROCEDURE — 85025 COMPLETE CBC W/AUTO DIFF WBC: CPT

## 2024-08-02 PROCEDURE — 82947 ASSAY GLUCOSE BLOOD QUANT: CPT

## 2024-08-02 PROCEDURE — 97535 SELF CARE MNGMENT TRAINING: CPT | Mod: GO

## 2024-08-02 PROCEDURE — 83735 ASSAY OF MAGNESIUM: CPT

## 2024-08-02 PROCEDURE — 93325 DOPPLER ECHO COLOR FLOW MAPG: CPT

## 2024-08-02 PROCEDURE — 97530 THERAPEUTIC ACTIVITIES: CPT | Mod: GO

## 2024-08-02 PROCEDURE — 2060000001 HC INTERMEDIATE ICU ROOM DAILY

## 2024-08-02 PROCEDURE — 99232 SBSQ HOSP IP/OBS MODERATE 35: CPT | Performed by: STUDENT IN AN ORGANIZED HEALTH CARE EDUCATION/TRAINING PROGRAM

## 2024-08-02 PROCEDURE — 90937 HEMODIALYSIS REPEATED EVAL: CPT

## 2024-08-02 PROCEDURE — 2500000005 HC RX 250 GENERAL PHARMACY W/O HCPCS

## 2024-08-02 PROCEDURE — 2500000002 HC RX 250 W HCPCS SELF ADMINISTERED DRUGS (ALT 637 FOR MEDICARE OP, ALT 636 FOR OP/ED)

## 2024-08-02 PROCEDURE — 6350000001 HC RX 635 EPOETIN >10,000 UNITS: Mod: JZ

## 2024-08-02 PROCEDURE — 99291 CRITICAL CARE FIRST HOUR: CPT | Performed by: NURSE PRACTITIONER

## 2024-08-02 PROCEDURE — 93308 TTE F-UP OR LMTD: CPT | Performed by: INTERNAL MEDICINE

## 2024-08-02 PROCEDURE — 99232 SBSQ HOSP IP/OBS MODERATE 35: CPT | Performed by: INTERNAL MEDICINE

## 2024-08-02 RX ORDER — INSULIN GLARGINE 100 [IU]/ML
8 INJECTION, SOLUTION SUBCUTANEOUS NIGHTLY
Status: DISCONTINUED | OUTPATIENT
Start: 2024-08-02 | End: 2024-08-11

## 2024-08-02 RX ORDER — NOREPINEPHRINE BITARTRATE/D5W 8 MG/250ML
.01-1 PLASTIC BAG, INJECTION (ML) INTRAVENOUS CONTINUOUS
Status: DISCONTINUED | OUTPATIENT
Start: 2024-08-02 | End: 2024-08-06

## 2024-08-02 ASSESSMENT — COGNITIVE AND FUNCTIONAL STATUS - GENERAL
DRESSING REGULAR UPPER BODY CLOTHING: A LOT
PERSONAL GROOMING: A LOT
TOILETING: TOTAL
DAILY ACTIVITIY SCORE: 9
EATING MEALS: A LOT
HELP NEEDED FOR BATHING: TOTAL
DRESSING REGULAR LOWER BODY CLOTHING: TOTAL

## 2024-08-02 ASSESSMENT — PAIN SCALES - GENERAL
PAINLEVEL_OUTOF10: 6
PAINLEVEL_OUTOF10: 0 - NO PAIN

## 2024-08-02 ASSESSMENT — PAIN - FUNCTIONAL ASSESSMENT
PAIN_FUNCTIONAL_ASSESSMENT: 0-10

## 2024-08-02 ASSESSMENT — ACTIVITIES OF DAILY LIVING (ADL): HOME_MANAGEMENT_TIME_ENTRY: 16

## 2024-08-02 NOTE — PROGRESS NOTES
Subjective   Edwar Hemphill has no new complaints    Objective  He has new hypotension requiring transient levophed.    Medications    Current Facility-Administered Medications:     acetaminophen (Tylenol) tablet 650 mg, 650 mg, oral, q4h PRN, 650 mg at 08/01/24 2012 **OR** acetaminophen (Tylenol) oral liquid 650 mg, 650 mg, nasogastric tube, q4h PRN **OR** acetaminophen (Tylenol) suppository 650 mg, 650 mg, rectal, q4h PRN, Blaine Gonzalez MD    atorvastatin (Lipitor) tablet 40 mg, 40 mg, oral, Nightly, Blaine Gonzalez MD, 40 mg at 08/01/24 2012    B complex-vitamin C-folic acid (Nephrocaps) capsule 1 capsule, 1 capsule, oral, Daily, Blaine Gonzalez MD, 1 capsule at 08/02/24 0802    ceftaroline (Teflaro) 200 mg in dextrose 5% 50 mL IV, 200 mg, intravenous, q8h, Praveen Koo MD PhD, Stopped at 08/02/24 0857    cholecalciferol (Vitamin D-3) capsule 50,000 Units, 50,000 Units, oral, Once per day on Monday Thursday, Blaine Gonzalez MD, 50,000 Units at 08/01/24 0826    dextrose 50 % injection 12.5 g, 12.5 g, intravenous, q15 min PRN, Blaine Gonzalez MD, 12.5 g at 07/31/24 0029    dextrose 50 % injection 25 g, 25 g, intravenous, q15 min PRN, Blaine Gonzalez MD, 25 g at 07/31/24 1730    diphenhydrAMINE (BENADryl) capsule 25 mg, 25 mg, oral, q6h PRN, Blaine Gonzalez MD, 25 mg at 07/24/24 1525    epoetin tyson (Epogen,Procrit) injection 10,000 Units, 10,000 Units, subcutaneous, Once per day on Monday Wednesday Friday, Blaine Gonzalez MD, 10,000 Units at 08/02/24 0802    folic acid (Folvite) tablet 1 mg, 1 mg, oral, Daily, Blaine Gonzalez MD, 1 mg at 08/02/24 0802    glucagon (Glucagen) injection 1 mg, 1 mg, intramuscular, q15 min PRN, Blaine Gonzalez MD    glucagon (Glucagen) injection 1 mg, 1 mg, intramuscular, q15 min PRN, Blaine Gonzalez MD    heparin 1,000 unit/mL injection 1,000 Units, 1,000 Units, intra-catheter, PRN, Blaien Gonzalez MD    heparin 1,000 unit/mL injection 1,000 Units, 1,000 Units, intra-catheter, PRN, Blaine  MD Lisa, 1,000 Units at 07/26/24 1400    heparin 25,000 Units in dextrose 5% 250 mL (100 Units/mL) infusion (premix), 0-4,500 Units/hr, intravenous, Continuous, Heydi Quiñones MD, Last Rate: 14 mL/hr at 08/02/24 1141, 1,400 Units/hr at 08/02/24 1141    insulin glargine (Lantus) injection 8 Units, 8 Units, subcutaneous, Nightly, Lupe Lopez, APRN-CNP    insulin lispro (HumaLOG) injection 0-10 Units, 0-10 Units, subcutaneous, TID, Blaine Gonzalez MD, 8 Units at 08/02/24 0801    levothyroxine (Synthroid, Levoxyl) tablet 125 mcg, 125 mcg, oral, Daily before breakfast, Blaine Gonzalez MD, 125 mcg at 08/02/24 0759    lidocaine (LMX) 4 % cream, , Topical, 4x daily PRN, Blaine Gonzalez MD, Given at 07/24/24 0828    melatonin tablet 6 mg, 6 mg, oral, Nightly, Blaine Gonzalez MD, 6 mg at 08/01/24 2012    midodrine (Proamatine) tablet 15 mg, 15 mg, oral, q8h, Heydi Quiñones MD, 15 mg at 08/02/24 0643    norepinephrine (Levophed) 8 mg in dextrose 5% 250 mL (0.032 mg/mL) infusion (premix), 0.01-1 mcg/kg/min (Dosing Weight), intravenous, Continuous, Heydi Quiñones MD, Stopped at 08/02/24 0700    nystatin (Mycostatin) 100,000 unit/gram powder 1 Application, 1 Application, Topical, BID, Blaine Gonzalez MD, 1 Application at 08/02/24 0809    oxyCODONE (Roxicodone) immediate release tablet 5 mg, 5 mg, oral, q6h PRN, Don Hernández MD, 5 mg at 08/01/24 2012    pantoprazole (ProtoNix) EC tablet 40 mg, 40 mg, oral, Daily before breakfast, Gerardo Foreman MD, 40 mg at 08/02/24 0801    thiamine (Vitamin B-1) tablet 200 mg, 200 mg, oral, Daily, Blaine Gonzalez MD, 200 mg at 08/02/24 0802    vancomycin (Vancocin) pharmacy to dose - pharmacy monitoring, , miscellaneous, Daily PRN, Blaine Gonzalez MD    Objective     Physical Exam  Heart S1 S2 RRR, Lungs CTA, no edema      Vital signs in last 24 hours:  Temp:  [36 °C (96.8 °F)-36.2 °C (97.2 °F)] 36 °C (96.8 °F)  Heart Rate:  [69-84] 72  Resp:  [0-24] 12  BP:  ()/(35-83) 91/64       Intake/Output last 3 shifts:  I/O last 3 completed shifts:  In: 1137.1 (16 mL/kg) [P.O.:180; I.V.:307.1 (4.3 mL/kg); IV Piggyback:650]  Out: - (0 mL/kg)   Dosing Weight: 70.9 kg     Labs:  Results from last 7 days   Lab Units 08/02/24  0520   WBC AUTO x10*3/uL 11.7*   RBC AUTO x10*6/uL 2.97*   HEMOGLOBIN g/dL 8.8*   HEMATOCRIT % 26.5*     Results from last 7 days   Lab Units 08/02/24  0520   SODIUM mmol/L 132*   POTASSIUM mmol/L 4.9   CHLORIDE mmol/L 98   CO2 mmol/L 22   BUN mg/dL 22   CREATININE mg/dL 3.23*   CALCIUM mg/dL 8.2*   PHOSPHORUS mg/dL 5.2*   MAGNESIUM mg/dL 2.03   BILIRUBIN TOTAL mg/dL 0.6   ALT U/L 19   AST U/L 21       Assessment/Plan     Edwar Hemphill is a  39 y.o.  Year old , with PMHx of ESRD on HD (right femoral line), HFrEF  T2DM, PVD (s/p left AKA and right forearm amputation), multiple line-related DVT (RIJ, LIJ, RUE, LUE, RLE, LLE; on Eliquis prior to recent upper GI bleed 6/15) presenting from [facility] due to hypotension prior to dialysis. Admitted to MICU for septic shock 2/2 mrsa bacteremia. Also found to have septic arthritis. Source likely femoral line which has been difficult to replace. Nephrology consulted for dialysis needs.      Note that cardiology unable to perform any invasive procedures due to overall status.       #ESRD on HD MWF via R femoral TDC  #Hx of multiple HD catheter infections, mrsa bacteremia   -HD at [facility], nephrologist Dr. Licona   Started on CVVH 7/23 due to hemodynamic instability, improved  Stopped CVVH and back to IHD but due to Hemodynamic instability back on SLED now.    #Septic shock 2/2 staph aureus bacteremia on Antibiotics.    #TV infective endocarditis   -vancomycin, ceftaroline   -Was planned for Endovascular vegectomy but that was deferred due to the general condition of the patient.           RECOMMENDATIONS:  - Proceed with SLED as ordered today  - Reassess for further need for dialysis/SLED 8/3  - Renal panel  repeat.  - Strict I/Os    Peng Daniel MD  8/2/2024  3:33 PM

## 2024-08-02 NOTE — PROGRESS NOTES
Vancomycin Dosing by Pharmacy- FOLLOW UP    Edwar Hemphill is a 39 y.o. year old male who Pharmacy has been consulted for vancomycin dosing for line infections. Based on the patient's indication and renal status this patient is being dosed based on a goal pre-HD level of 20-25.     Renal function is currently iHD dependent.    Most recent random level: 22.9 mcg/mL    Visit Vitals  /83 (BP Location: Left arm, Patient Position: Lying)   Pulse 70   Temp 36.2 °C (97.2 °F)   Resp 10        Lab Results   Component Value Date    CREATININE 3.23 (H) 2024    CREATININE 2.28 (H) 2024    CREATININE 3.15 (H) 2024    CREATININE 3.03 (H) 2024        Patient weight is as follows:   Vitals:    24 0500   Weight: 75.4 kg (166 lb 3.6 oz)       Cultures:  Susceptibility data for the encounter in last 14 days.  Collected Specimen Info Organism Clindamycin Erythromycin Oxacillin Rifampin Tetracycline Trimethoprim/Sulfamethoxazole Vancomycin   24 Fluid from Synovial Methicillin Resistant Staphylococcus aureus (MRSA)  R  R  R   R  S  S   24 Blood culture from Peripheral Venipuncture Staphylococcus haemolyticus  R  R  R   S  R  S   24 Blood culture from Peripheral Venipuncture Staphylococcus aureus          24 Blood culture from Peripheral Venipuncture Staphylococcus aureus          24 Blood culture from Peripheral Venipuncture Methicillin Resistant Staphylococcus aureus (MRSA)  R  R  R  I  R  S  S   24 Blood culture from Peripheral Venipuncture Staphylococcus aureus               I/O last 3 completed shifts:  In: 1137.1 (16 mL/kg) [P.O.:180; I.V.:307.1 (4.3 mL/kg); IV Piggyback:650]  Out: - (0 mL/kg)   Dosing Weight: 70.9 kg   I/O during current shift:  I/O this shift:  In: 50 [IV Piggyback:50]  Out: -     Temp (24hrs), Av.1 °C (97 °F), Min:36 °C (96.8 °F), Max:36.2 °C (97.2 °F)      Assessment/Plan    Within goal random/trough level. Will continue to dose after HD  as appropriate.   The next level will be obtained prior to the next HD session.  May be obtained sooner if clinically indicated.   Will continue to monitor renal function daily while on vancomycin and order serum creatinine at least every 48 hours if not already ordered.  Follow for continued vancomycin needs, clinical response, and signs/symptoms of toxicity.       Giana Fuller, JaisonD

## 2024-08-02 NOTE — CARE PLAN
Problem: Pain - Adult  Goal: Verbalizes/displays adequate comfort level or baseline comfort level  Outcome: Progressing  Flowsheets (Taken 8/2/2024 1504)  Verbalizes/displays adequate comfort level or baseline comfort level: Encourage patient to monitor pain and request assistance     Problem: Safety - Adult  Goal: Free from fall injury  Outcome: Progressing  Flowsheets (Taken 8/2/2024 1504)  Free from fall injury: Instruct family/caregiver on patient safety     Problem: Chronic Conditions and Co-morbidities  Goal: Patient's chronic conditions and co-morbidity symptoms are monitored and maintained or improved  Outcome: Progressing  Flowsheets (Taken 8/2/2024 1504)  Care Plan - Patient's Chronic Conditions and Co-Morbidity Symptoms are Monitored and Maintained or Improved: Update acute care plan with appropriate goals if chronic or comorbid symptoms are exacerbated and prevent overall improvement and discharge     Problem: Skin  Goal: Decreased wound size/increased tissue granulation at next dressing change  Outcome: Progressing  Flowsheets (Taken 8/2/2024 1504)  Decreased wound size/increased tissue granulation at next dressing change:   Promote sleep for wound healing   Protective dressings over bony prominences   Utilize specialty bed per algorithm  Goal: Participates in plan/prevention/treatment measures  Outcome: Progressing  Flowsheets (Taken 8/2/2024 1504)  Participates in plan/prevention/treatment measures:   Discuss with provider PT/OT consult   Elevate heels  Goal: Prevent/manage excess moisture  Outcome: Progressing  Flowsheets (Taken 8/2/2024 1504)  Prevent/manage excess moisture:   Cleanse incontinence/protect with barrier cream   Monitor for/manage infection if present   Follow provider orders for dressing changes  Goal: Prevent/minimize sheer/friction injuries  Outcome: Progressing  Flowsheets (Taken 8/2/2024 1504)  Prevent/minimize sheer/friction injuries:   Use pull sheet   Utilize specialty bed  per algorithm   Turn/reposition every 2 hours/use positioning/transfer devices  Goal: Promote/optimize nutrition  Outcome: Progressing  Flowsheets (Taken 8/2/2024 1504)  Promote/optimize nutrition:   Consume > 50% meals/supplements   Monitor/record intake including meals  Goal: Promote skin healing  Outcome: Progressing  Flowsheets (Taken 8/2/2024 1504)  Promote skin healing:   Turn/reposition every 2 hours/use positioning/transfer devices   Protective dressings over bony prominences   Assess skin/pad under line(s)/device(s)   Ensure correct size (line/device) and apply per  instructions   Rotate device position/do not position patient on device     Problem: Diabetes  Goal: Maintain electrolyte levels within acceptable range throughout shift  Outcome: Progressing  Flowsheets (Taken 8/2/2024 1504)  Maintain electrolyte levels within acceptable range throughout shift: Med administration/monitoring of effect  Goal: Maintain glucose levels >70mg/dl to <250mg/dl throughout shift  Outcome: Progressing  Flowsheets (Taken 8/2/2024 1504)  Maintain glucose levels >70mg/dl to <250mg/dl throughout shift: Med administration/monitoring of effect  Goal: No changes in neurological exam by end of shift  Outcome: Progressing  Flowsheets (Taken 8/2/2024 1504)  No changes in neurological exam by end of shift: Complete frequent neurological assessments  Goal: Learn about and adhere to nutrition recommendations by end of shift  Outcome: Progressing  Flowsheets (Taken 8/2/2024 1504)  Learn about and adhere to nutrition recommendations by end of shift: Ensure/encourage compliance with appropriate diet  Goal: Vital signs within normal range for age by end of shift  Outcome: Progressing  Flowsheets (Taken 8/2/2024 1504)  Vital signs within normal range for age by end of shift: Med administration/monitoring of effect  Goal: Increase self care and/or family involovement by end of shift  Outcome: Progressing  Goal: Receive DSME  education by end of shift  Outcome: Progressing  Flowsheets (Taken 8/2/2024 5906)  Receive DSME education by end of shift: Provide patient centered education on Diabetic Self Management Education     Problem: Fall/Injury  Goal: Not fall by end of shift  Outcome: Progressing  Goal: Be free from injury by end of the shift  Outcome: Progressing  Goal: Verbalize understanding of personal risk factors for fall in the hospital  Outcome: Progressing   The patient's goals for the shift include      The clinical goals for the shift include: patient will tolerate SLED dialysis, have vital signs WNL, engage in interventions to promote skin healing and reduce risk of infections

## 2024-08-02 NOTE — SIGNIFICANT EVENT
Palliative medicine has been following for complex medical decision making / goals of care, symptom management, and pt/family support. Goals are clear, symptoms being managed by primary team at this time. Spoke with Dr. Foreman regarding case. We will sign off, please reconsult if needed.     Cori Woodward DNP, CNP  Palliative Medicine

## 2024-08-02 NOTE — CARE PLAN
Problem: Safety - Adult  Goal: Free from fall injury  Outcome: Progressing     Problem: Fall/Injury  Goal: Not fall by end of shift  Outcome: Progressing  Goal: Be free from injury by end of the shift  Outcome: Progressing   The patient's goals for the shift include      The clinical goals for the shift include VS within MD ordered parameters    Over the shift, the patient did not make progress toward the following goals. Barriers to progression include chronic illness. Recommendations to address these barriers include per MD order.

## 2024-08-02 NOTE — PROGRESS NOTES
Occupational Therapy    OT Treatment    Patient Name: Edwar Hemphill  MRN: [id number]  Today's Date: 8/2/2024  Time Calculation  Start Time: 1048  Stop Time: 1114  Time Calculation (min): 26 min        Assessment:  Medical Staff Made Aware: Yes  End of Session Communication: Bedside nurse  End of Session Patient Position: Bed, 3 rail up, Alarm off, not on at start of session  Medical Staff Made Aware: Yes  Plan:  Treatment Interventions: ADL retraining, Functional transfer training, Endurance training, Patient/family training, Compensatory technique education, Equipment evaluation/education  OT Frequency: 2 times per week  OT Discharge Recommendations: Moderate intensity level of continued care (return to facility)  OT Recommended Transfer Status: Dependent  OT - OK to Discharge: Yes  Treatment Interventions: ADL retraining, Functional transfer training, Endurance training, Patient/family training, Compensatory technique education, Equipment evaluation/education    Subjective   Previous Visit Info:  OT Last Visit  OT Received On: 08/02/24  General:  General  Reason for Referral: Since last session, pt had L wrist debridement, pt is now coffee cup weight bearing.  Prior to Session Communication: Bedside nurse  Patient Position Received: Bed, 3 rail up, Alarm off, not on at start of session  Preferred Learning Style: auditory, verbal, visual  General Comment: Pt supine in bed upon entry to room. Pt blackout shades down and lights off. OT opening shades and turning on lights for delirium precautions. Pt willing to work with OT.  Precautions:  UE Weight Bearing Status:  (LUE Coffee cup weight bearing)  Medical Precautions: Fall precautions  Vital Signs:  Vital Signs  Heart Rate: 75 (76 post)  Resp: 10 (14 post)  SpO2: 97 % (97 post)  BP: 100/64 (104/66 post)  MAP (mmHg): 72 (75 post)  Pain:  Pain Assessment  Pain Assessment: 0-10  0-10 (Numeric) Pain Score: 6  Pain Location: Wrist  Pain Orientation: Left    Objective     Cognition:  Cognition  Overall Cognitive Status: Impaired  Orientation Level: Disoriented to time, Disoriented to situation  Following Commands: Follows one step commands with increased time  Insight: Mild  Coordination:     Activities of Daily Living: Grooming  Grooming Level of Assistance: Maximum assistance  Grooming Where Assessed: Bed level  Grooming Comments: Bed in chair position, pt given bathing wipes, briefly washes face prior to reporting pain in LUE OT finishing task    UE Bathing  UE Bathing Level of Assistance: Maximum assistance  UE Bathing Where Assessed: Bed level  UE Bathing Comments: Bed in chair position, pt given bathing wipes, briefly washesUB prior to reporting pain in LUE OT finishing task. Assist with washing LUE         UE Dressing  UE Dressing Level of Assistance: Maximum assistance  UE Dressing Where Assessed: Bed level  UE Dressing Comments: doffing soiled gown/ donning new  Functional Standing Tolerance:     Bed Mobility/Transfers: Bed Mobility  Bed Mobility: Yes  Bed Mobility 1  Bed Mobility 1: Rolling left, Rolling right  Level of Assistance 1: Moderate assistance (x1)  Bed Mobility 2  Bed Mobility Comments 2: Pt dependently placed in chair position       Therapy/Activity: Therapeutic Exercise  Therapeutic Exercise Performed: Yes  Therapeutic Exercise Activity 1: OPening/closing L hand to decrease edema x10    Therapeutic Activity  Therapeutic Activity Performed: Yes  Therapeutic Activity 1: Reorientation provided, environmental modificaitons to decrease risk of deliriu.  Therapeutic Activity 2: Pt tolerates bed in chair position for duration of session with VSS. Left in bed in chair position at end of session. LUE positioned on pillows for edema management      Outcome Measures:[facility] Daily Activity  Putting on and taking off regular lower body clothing: Total  Bathing (including washing, rinsing, drying): Total  Putting on and taking off regular upper body clothing: A  lot  Toileting, which includes using toilet, bedpan or urinal: Total  Taking care of personal grooming such as brushing teeth: A lot  Eating Meals: A lot  Daily Activity - Total Score: 9        , Confusion Assessment Method-ICU (CAM-ICU)  Feature 1: Acute Onset or Fluctuating Course: Positive  Feature 2: Inattention: Positive  Feature 4: Disorganized Thinking: Negative  , and         Education Documentation  Body Mechanics, taught by Nadia Terrazas OT at 8/2/2024  3:56 PM.  Learner: Patient  Readiness: Acceptance  Method: Explanation  Response: Needs Reinforcement    Precautions, taught by Nadia Terrazas OT at 8/2/2024  3:56 PM.  Learner: Patient  Readiness: Acceptance  Method: Explanation  Response: Needs Reinforcement    ADL Training, taught by Nadia Terrazas OT at 8/2/2024  3:56 PM.  Learner: Patient  Readiness: Acceptance  Method: Explanation  Response: Needs Reinforcement    Education Comments  No comments found.        OP EDUCATION:       Goals:  Encounter Problems       Encounter Problems (Active)       ADLs       Patient with complete upper body dressing with minimal assist  level of assistance donning and doffing all UE clothes while edge of bed  (Not Progressing)       Start:  07/23/24    Expected End:  08/13/24            Patient with complete lower body dressing with moderate assist level of assistance donning and doffing all LE clothes while supported sitting (Not Progressing)       Start:  07/23/24    Expected End:  08/13/24            Patient will complete daily grooming tasks  with independent level of assistance and PRN adaptive equipment. (Progressing)       Start:  07/23/24    Expected End:  08/13/24            Patient will complete toileting including hygiene clothing management/hygiene with moderate assist level of assistance and bedside commode. (Not Progressing)       Start:  07/23/24    Expected End:  08/13/24               TRANSFERS       Patient will perform bed mobility moderate assist  level of assistance in order to improve safety and independence with mobility (Progressing)       Start:  07/23/24    Expected End:  08/13/24 08/02/24 at 3:57 PM - Nadia Terrazas OT

## 2024-08-02 NOTE — PROGRESS NOTES
"Medical Intensive Care - Daily Progress Note   Subjective    Edwar Hemphill is a 39 y.o. year old male patient admitted on 7/22/2024 with following ICU needs: hypotension requiring vasopressors    Interval History:  Heparin held    Meds    Scheduled medications  atorvastatin, 40 mg, oral, Nightly  B complex-vitamin C-folic acid, 1 capsule, oral, Daily  ceftaroline, 200 mg, intravenous, q8h  cholecalciferol, 50,000 Units, oral, Once per day on Monday Thursday  epoetin tyson or biosimilar, 10,000 Units, subcutaneous, Once per day on Monday Wednesday Friday  folic acid, 1 mg, oral, Daily  insulin glargine, 8 Units, subcutaneous, Nightly  insulin lispro, 0-10 Units, subcutaneous, TID  levothyroxine, 125 mcg, oral, Daily before breakfast  melatonin, 6 mg, oral, Nightly  midodrine, 15 mg, oral, q8h  nystatin, 1 Application, Topical, BID  pantoprazole, 40 mg, oral, Daily before breakfast  thiamine, 200 mg, oral, Daily      Continuous medications  heparin, 0-4,500 Units/hr, Last Rate: 1,400 Units/hr (08/02/24 1141)  norepinephrine, 0.01-1 mcg/kg/min (Dosing Weight), Last Rate: Stopped (08/02/24 0700)      PRN medications  PRN medications: acetaminophen **OR** acetaminophen **OR** acetaminophen, dextrose, dextrose, diphenhydrAMINE, glucagon, glucagon, heparin, heparin, lidocaine, oxyCODONE, vancomycin     Objective    Blood pressure 129/83, pulse 70, temperature 36 °C (96.8 °F), resp. rate 10, height 1.753 m (5' 9.02\"), weight 75.4 kg (166 lb 3.6 oz), SpO2 97%.     Physical Exam  Constitutional:       Appearance: He is ill-appearing.   HENT:      Mouth/Throat:      Mouth: Mucous membranes are moist.   Eyes:      Conjunctiva/sclera: Conjunctivae normal.   Cardiovascular:      Rate and Rhythm: Normal rate and regular rhythm.      Heart sounds: Normal heart sounds.   Pulmonary:      Effort: Pulmonary effort is normal.      Breath sounds: Normal breath sounds.   Abdominal:      General: Abdomen is flat. Bowel sounds are normal. "   Musculoskeletal:      Cervical back: Normal range of motion.      Comments: RUE amputation, LUE swelling with ACE warp      Left Lower Extremity: Left leg is amputated above knee.   Skin:     General: Skin is warm and dry.          Intake/Output Summary (Last 24 hours) at 8/2/2024 1422  Last data filed at 8/2/2024 0808  Gross per 24 hour   Intake 255.75 ml   Output --   Net 255.75 ml     Labs:   Results from last 72 hours   Lab Units 08/02/24  0520 08/01/24  0542 07/31/24  0542   SODIUM mmol/L 132* 133* 136   POTASSIUM mmol/L 4.9 4.6 4.7   CHLORIDE mmol/L 98 101 101   CO2 mmol/L 22 26 24   BUN mg/dL 22 15 24*   CREATININE mg/dL 3.23* 2.28* 3.15*   GLUCOSE mg/dL 333* 293* 32*   CALCIUM mg/dL 8.2* 7.9* 8.5*   ANION GAP mmol/L 17 11 16   EGFR mL/min/1.73m*2 24* 36* 25*   PHOSPHORUS mg/dL 5.2* 4.3 5.2*      Results from last 72 hours   Lab Units 08/02/24  0520 08/01/24  2353 08/01/24  1754 08/01/24  1243 08/01/24  0743   WBC AUTO x10*3/uL 11.7* 12.1* 10.6 11.8* 10.1   HEMOGLOBIN g/dL 8.8* 8.9* 7.8* 7.8* 7.7*   HEMATOCRIT % 26.5* 26.9* 23.5* 24.2* 23.1*   PLATELETS AUTO x10*3/uL 41* 53* 56* 52* 49*   NEUTROS PCT AUTO % 78.7  --  76.1  --  77.3   LYMPHO PCT MAN %  --   --   --  6.9  --    LYMPHS PCT AUTO % 13.1  --  15.8  --  15.4   MONO PCT MAN %  --   --   --  2.6  --    MONOS PCT AUTO % 6.1  --  5.9  --  5.2   EOSINO PCT MAN %  --   --   --  0.0  --    EOS PCT AUTO % 0.4  --  0.7  --  0.5        Micro/ID:     Lab Results   Component Value Date    BLOODCULT No growth at 2 days 07/31/2024    BLOODCULT No growth at 2 days 07/31/2024       Summary of key imaging results from the last 24 hours  NA    Assessment and Plan     Assessment: Edwar Hemphill is a 39 y.o. year old male patient admitted on 7/22/2024 with septic shock 2/2 infective endocarditis.    Mechanical Ventilation: none  Sedation/Analgesia:  none  Restraints: no    Summary for 08/02/24  :  Resume heparin  Wean levo  SLED    Plan:  NEUROLOGY/PSYCH:  Dx:  Flat  affect  Has left AMA  in past  Management:  Pain management with tylenol and oxy    CARDIOVASCULAR:  Dx:  Infective endocarditis with TV vegetation, Severely attenuated superior vena cava, bilateral brachiocephalic, subclavian and internal jugular veins with the calcifications in the brachiocephalic veins with extensive collaterals in the chest wall likely representing chronic thrombosis. Patient had vegectomy aborted due to to concerns for infected thrombus in the IVC.  Persistent hypotension.  Weaned off pressors this am  Management:  Map >65  Midodrine 15mg every 8 hours    PULMONARY:  Dx:  NAVI  Management:  NA    RENAL/GENITOURINARY:  Dx:  ERSD on MWF HD via R fem HD line (IR placed)  Borderline BP to tolerate HD today  Management:  SLED per Nephro  Hold of on IR replacement of HD line (recent cultures clear)    GASTROENTEROLOGY:  Dx:  Esophagitis, GERD  Tolerating oral diet  Management:  PPI    ENDOCRINOLOGY:  Dx: DM 1   - increase glargine 8 units   - sliding scale insulin    HEMATOLOGY:  Dx:  Anemia of chronic disease, thrombocytopenia  No concern for KALLIE at this time  Management:  Resume heparin gtt  Daily CBC    MUSCULOSKELETAL/ SKIN:  Dx:  Septic arthritis of Left wrist  Culture growing MRSA  Management:  Vanco/ceftaroline    INFECTIOUS DISEASE:  Dx:  MRSA, Staph haemolyticus bacteremia  Culture from 7/29 negative, Culture from 7/31 NGTD  Management:  ID following  Vanco/Ceftaroline    ICU Check List       ICU Liberation: Intervention:   Assess, Prevent, Manage Pain CPOT - NA   Both SAT and SBT [] SAT  [] SBT 30-60 min [] Extubate to NC  [] Extubate to NIV  [] Discuss Trach   Choice of analgesia and sedation RASS: 0  none NA   Delirium: Assess, prevent and manage CAM - NA   Early Mobility and Exercise  [x] PT /OT consult   Family Engagement and Empowerment []Family updated [x]SW consult     FEN  Fluids: PRN  Electrolytes: HD  Nutrition: Oral diet  Prophylaxis:  DVT ppx: heparin gtt  GI ppx: PPI  Bowel  care: NA  Hardware:  Catheter: NA  Access: R fem dialysis line  Drains: NA  Social:  Code: Full Code    HPOA: Shanthi Hemphill - Mobile Phone: [phone]  Secondary Emergency Contact: Terra Scott  Mobile Phone: [phone]  Disposition: MICU while receiving SLED, can go to SDU when tolerating HD    OSBALDO Kiser   08/02/24 at 2:22 PM     Disclaimer: Documentation completed with the information available at the time of input. The times in the chart may not be reflective of actual patient care times, interventions, or procedures. Documentation occurs after the physical care of the patient.

## 2024-08-03 LAB
ALBUMIN SERPL BCP-MCNC: 2.3 G/DL (ref 3.4–5)
ALP SERPL-CCNC: 194 U/L (ref 33–120)
ALT SERPL W P-5'-P-CCNC: 14 U/L (ref 10–52)
ANION GAP SERPL CALC-SCNC: 13 MMOL/L (ref 10–20)
AST SERPL W P-5'-P-CCNC: 12 U/L (ref 9–39)
BASOPHILS # BLD MANUAL: 0.22 X10*3/UL (ref 0–0.1)
BASOPHILS NFR BLD MANUAL: 2.6 %
BILIRUB SERPL-MCNC: 0.8 MG/DL (ref 0–1.2)
BUN SERPL-MCNC: 12 MG/DL (ref 6–23)
BURR CELLS BLD QL SMEAR: ABNORMAL
CALCIUM SERPL-MCNC: 8.1 MG/DL (ref 8.6–10.6)
CHLORIDE SERPL-SCNC: 96 MMOL/L (ref 98–107)
CO2 SERPL-SCNC: 26 MMOL/L (ref 21–32)
CORTIS SERPL-MCNC: 22.1 UG/DL (ref 2.5–20)
CREAT SERPL-MCNC: 2.32 MG/DL (ref 0.5–1.3)
EGFRCR SERPLBLD CKD-EPI 2021: 36 ML/MIN/1.73M*2
EOSINOPHIL # BLD MANUAL: 0 X10*3/UL (ref 0–0.7)
EOSINOPHIL NFR BLD MANUAL: 0 %
ERYTHROCYTE [DISTWIDTH] IN BLOOD BY AUTOMATED COUNT: 21.4 % (ref 11.5–14.5)
GLUCOSE BLD MANUAL STRIP-MCNC: 164 MG/DL (ref 74–99)
GLUCOSE BLD MANUAL STRIP-MCNC: 170 MG/DL (ref 74–99)
GLUCOSE BLD MANUAL STRIP-MCNC: 219 MG/DL (ref 74–99)
GLUCOSE BLD MANUAL STRIP-MCNC: 318 MG/DL (ref 74–99)
GLUCOSE SERPL-MCNC: 177 MG/DL (ref 74–99)
HCT VFR BLD AUTO: 24.8 % (ref 41–52)
HGB BLD-MCNC: 7.9 G/DL (ref 13.5–17.5)
HYPOCHROMIA BLD QL SMEAR: ABNORMAL
IMM GRANULOCYTES # BLD AUTO: 0.07 X10*3/UL (ref 0–0.7)
IMM GRANULOCYTES NFR BLD AUTO: 0.8 % (ref 0–0.9)
LYMPHOCYTES # BLD MANUAL: 0.73 X10*3/UL (ref 1.2–4.8)
LYMPHOCYTES NFR BLD MANUAL: 8.7 %
MAGNESIUM SERPL-MCNC: 1.77 MG/DL (ref 1.6–2.4)
MCH RBC QN AUTO: 30.3 PG (ref 26–34)
MCHC RBC AUTO-ENTMCNC: 31.9 G/DL (ref 32–36)
MCV RBC AUTO: 95 FL (ref 80–100)
MONOCYTES # BLD MANUAL: 0.22 X10*3/UL (ref 0.1–1)
MONOCYTES NFR BLD MANUAL: 2.6 %
NEUTS SEG # BLD MANUAL: 7.23 X10*3/UL (ref 1.2–7)
NEUTS SEG NFR BLD MANUAL: 86.1 %
NRBC BLD-RTO: 0.2 /100 WBCS (ref 0–0)
OVALOCYTES BLD QL SMEAR: ABNORMAL
PHOSPHATE SERPL-MCNC: 3.5 MG/DL (ref 2.5–4.9)
PLATELET # BLD AUTO: 45 X10*3/UL (ref 150–450)
POLYCHROMASIA BLD QL SMEAR: ABNORMAL
POTASSIUM SERPL-SCNC: 3.5 MMOL/L (ref 3.5–5.3)
PROT SERPL-MCNC: 5.1 G/DL (ref 6.4–8.2)
RBC # BLD AUTO: 2.61 X10*6/UL (ref 4.5–5.9)
RBC MORPH BLD: ABNORMAL
SODIUM SERPL-SCNC: 131 MMOL/L (ref 136–145)
T4 FREE SERPL-MCNC: 1.19 NG/DL (ref 0.78–1.48)
TOTAL CELLS COUNTED BLD: 115
TSH SERPL-ACNC: 8.6 MIU/L (ref 0.44–3.98)
TSH SERPL-ACNC: 9.41 MIU/L (ref 0.44–3.98)
UFH PPP CHRO-ACNC: 0.6 IU/ML
VANCOMYCIN SERPL-MCNC: 17.2 UG/ML (ref 5–20)
WBC # BLD AUTO: 8.4 X10*3/UL (ref 4.4–11.3)

## 2024-08-03 PROCEDURE — 80202 ASSAY OF VANCOMYCIN: CPT | Performed by: INTERNAL MEDICINE

## 2024-08-03 PROCEDURE — 84075 ASSAY ALKALINE PHOSPHATASE: CPT

## 2024-08-03 PROCEDURE — 85027 COMPLETE CBC AUTOMATED: CPT

## 2024-08-03 PROCEDURE — 84100 ASSAY OF PHOSPHORUS: CPT

## 2024-08-03 PROCEDURE — 85520 HEPARIN ASSAY: CPT

## 2024-08-03 PROCEDURE — 36415 COLL VENOUS BLD VENIPUNCTURE: CPT

## 2024-08-03 PROCEDURE — 82947 ASSAY GLUCOSE BLOOD QUANT: CPT

## 2024-08-03 PROCEDURE — 2500000002 HC RX 250 W HCPCS SELF ADMINISTERED DRUGS (ALT 637 FOR MEDICARE OP, ALT 636 FOR OP/ED): Performed by: NURSE PRACTITIONER

## 2024-08-03 PROCEDURE — 2500000001 HC RX 250 WO HCPCS SELF ADMINISTERED DRUGS (ALT 637 FOR MEDICARE OP)

## 2024-08-03 PROCEDURE — 84443 ASSAY THYROID STIM HORMONE: CPT | Performed by: NURSE PRACTITIONER

## 2024-08-03 PROCEDURE — 2500000004 HC RX 250 GENERAL PHARMACY W/ HCPCS (ALT 636 FOR OP/ED): Mod: JZ

## 2024-08-03 PROCEDURE — 99232 SBSQ HOSP IP/OBS MODERATE 35: CPT | Performed by: STUDENT IN AN ORGANIZED HEALTH CARE EDUCATION/TRAINING PROGRAM

## 2024-08-03 PROCEDURE — 99291 CRITICAL CARE FIRST HOUR: CPT | Performed by: NURSE PRACTITIONER

## 2024-08-03 PROCEDURE — 85007 BL SMEAR W/DIFF WBC COUNT: CPT

## 2024-08-03 PROCEDURE — 83735 ASSAY OF MAGNESIUM: CPT

## 2024-08-03 PROCEDURE — 2060000001 HC INTERMEDIATE ICU ROOM DAILY

## 2024-08-03 RX ORDER — MIDODRINE HYDROCHLORIDE 10 MG/1
20 TABLET ORAL EVERY 8 HOURS
Status: DISCONTINUED | OUTPATIENT
Start: 2024-08-03 | End: 2024-08-07

## 2024-08-03 ASSESSMENT — PAIN - FUNCTIONAL ASSESSMENT
PAIN_FUNCTIONAL_ASSESSMENT: 0-10

## 2024-08-03 ASSESSMENT — PAIN SCALES - GENERAL
PAINLEVEL_OUTOF10: 0 - NO PAIN

## 2024-08-03 NOTE — PROGRESS NOTES
"Medical Intensive Care - Daily Progress Note   Subjective    Edwar Hemphill is a 39 y.o. year old male patient admitted on 7/22/2024 with following ICU needs: hypotension requiring vasopressors    Interval History:  SLED stopped early due to hypotension, back on norepi    Meds    Scheduled medications  atorvastatin, 40 mg, oral, Nightly  B complex-vitamin C-folic acid, 1 capsule, oral, Daily  ceftaroline, 200 mg, intravenous, q8h  cholecalciferol, 50,000 Units, oral, Once per day on Monday Thursday  epoetin tyson or biosimilar, 10,000 Units, subcutaneous, Once per day on Monday Wednesday Friday  folic acid, 1 mg, oral, Daily  insulin glargine, 8 Units, subcutaneous, Nightly  insulin lispro, 0-10 Units, subcutaneous, TID  levothyroxine, 125 mcg, oral, Daily before breakfast  melatonin, 6 mg, oral, Nightly  midodrine, 20 mg, oral, q8h  nystatin, 1 Application, Topical, BID  pantoprazole, 40 mg, oral, Daily before breakfast  thiamine, 200 mg, oral, Daily      Continuous medications  heparin, 0-4,500 Units/hr, Last Rate: 1,700 Units/hr (08/03/24 0600)  norepinephrine, 0.01-1 mcg/kg/min (Dosing Weight), Last Rate: 0.01 mcg/kg/min (08/03/24 0600)      PRN medications  PRN medications: acetaminophen **OR** acetaminophen **OR** acetaminophen, dextrose, dextrose, diphenhydrAMINE, glucagon, glucagon, heparin, heparin, lidocaine, oxyCODONE, vancomycin     Objective    Blood pressure 104/60, pulse 93, temperature 36.5 °C (97.7 °F), temperature source Temporal, resp. rate 15, height 1.753 m (5' 9.02\"), weight 75.4 kg (166 lb 3.6 oz), SpO2 100%.     Physical Exam  Constitutional:       Appearance: He is ill-appearing.   HENT:      Mouth/Throat:      Mouth: Mucous membranes are moist.   Eyes:      Conjunctiva/sclera: Conjunctivae normal.   Cardiovascular:      Rate and Rhythm: Normal rate and regular rhythm.      Heart sounds: Normal heart sounds.   Pulmonary:      Effort: Pulmonary effort is normal.      Breath sounds: Normal breath " sounds.   Abdominal:      General: Abdomen is flat. Bowel sounds are normal.   Musculoskeletal:      Cervical back: Normal range of motion.      Comments: RUE amputation, LUE swelling with ACE wrap      Left Lower Extremity: Left leg is amputated above knee.   Skin:     General: Skin is warm and dry.            Intake/Output Summary (Last 24 hours) at 8/3/2024 1423  Last data filed at 8/3/2024 1103  Gross per 24 hour   Intake 443.2 ml   Output 780 ml   Net -336.8 ml     Labs:   Results from last 72 hours   Lab Units 08/03/24  0604 08/02/24  0520 08/01/24  0542   SODIUM mmol/L 131* 132* 133*   POTASSIUM mmol/L 3.5 4.9 4.6   CHLORIDE mmol/L 96* 98 101   CO2 mmol/L 26 22 26   BUN mg/dL 12 22 15   CREATININE mg/dL 2.32* 3.23* 2.28*   GLUCOSE mg/dL 177* 333* 293*   CALCIUM mg/dL 8.1* 8.2* 7.9*   ANION GAP mmol/L 13 17 11   EGFR mL/min/1.73m*2 36* 24* 36*   PHOSPHORUS mg/dL 3.5 5.2* 4.3      Results from last 72 hours   Lab Units 08/03/24  0604 08/02/24  0520 08/01/24  2353 08/01/24  1754 08/01/24  1243 08/01/24  0743   WBC AUTO x10*3/uL 8.4 11.7* 12.1* 10.6   < > 10.1   HEMOGLOBIN g/dL 7.9* 8.8* 8.9* 7.8*   < > 7.7*   HEMATOCRIT % 24.8* 26.5* 26.9* 23.5*   < > 23.1*   PLATELETS AUTO x10*3/uL 45* 41* 53* 56*   < > 49*   NEUTROS PCT AUTO %  --  78.7  --  76.1  --  77.3   LYMPHO PCT MAN % 8.7  --   --   --    < >  --    LYMPHS PCT AUTO %  --  13.1  --  15.8  --  15.4   MONO PCT MAN % 2.6  --   --   --    < >  --    MONOS PCT AUTO %  --  6.1  --  5.9  --  5.2   EOSINO PCT MAN % 0.0  --   --   --    < >  --    EOS PCT AUTO %  --  0.4  --  0.7  --  0.5    < > = values in this interval not displayed.        Micro/ID:     Lab Results   Component Value Date    BLOODCULT No growth at 3 days 07/31/2024    BLOODCULT No growth at 3 days 07/31/2024       Summary of key imaging results from the last 24 hours  NA    Assessment and Plan     Assessment: Edwar Hemphill is a 39 y.o. year old male patient admitted on 7/22/2024 with septic  shock 2/2 infective endocarditis.    Mechanical Ventilation: none  Sedation/Analgesia:  none  Restraints: no    Summary for 08/03/24  :  Wean levo    Plan:  NEUROLOGY/PSYCH:  Dx:  Flat affect  Has left AMA in past  Management:  Pain management with tylenol and oxy    CARDIOVASCULAR:  Dx:  Infective endocarditis with TV vegetation, Severely attenuated superior vena cava, bilateral brachiocephalic, subclavian and internal jugular veins with the calcifications in the brachiocephalic veins with extensive collaterals in the chest wall likely representing chronic thrombosis. Patient had vegectomy aborted due to to concerns for infected thrombus in the IVC.  Persistent hypotension.  Back on norepi with SLED  Management:  Map >65  Midodrine 20mg every 8 hours    PULMONARY:  Dx:  NAVI  Management:  NA    RENAL/GENITOURINARY:  Dx:  ERSD on MWF HD via R fem HD line (IR placed)  Hypotension with SLED  Management:  SLED vs IHD per Nephro  Hold of on IR replacement of HD line (recent cultures clear)    GASTROENTEROLOGY:  Dx:  Esophagitis, GERD  Tolerating oral diet  Management:  PPI    ENDOCRINOLOGY:  Dx: DM 1   - increase glargine 8 units   - sliding scale insulin    HEMATOLOGY:  Dx:  Anemia of chronic disease, thrombocytopenia  No concern for KALLIE at this time  Management:  Heparin gtt  Daily CBC    MUSCULOSKELETAL/ SKIN:  Dx:  Septic arthritis of Left wrist  Culture growing MRSA  Management:  Vanco/ceftaroline    INFECTIOUS DISEASE:  Dx:  MRSA, Staph haemolyticus bacteremia.  Hx of Acinetobacter  Culture from 7/29 negative, Culture from 7/31 NGTD  Management:  ID following  Vanco/Ceftaroline    ICU Check List       ICU Liberation: Intervention:   Assess, Prevent, Manage Pain CPOT - NA   Both SAT and SBT [] SAT  [] SBT 30-60 min [] Extubate to NC  [] Extubate to NIV  [] Discuss Trach   Choice of analgesia and sedation RASS: 0  none NA   Delirium: Assess, prevent and manage CAM - NA   Early Mobility and Exercise  [x] PT /OT  consult   Family Engagement and Empowerment []Family updated [x]SW consult     FEN  Fluids: PRN  Electrolytes: HD  Nutrition: Oral diet  Prophylaxis:  DVT ppx: heparin gtt  GI ppx: PPI  Bowel care: NA  Hardware:  Catheter: NA  Access: R fem dialysis line  Drains: NA  Social:  Code: Full Code    HPOA: Shanthi Hemphill - Mobile Phone: [phone]  Secondary Emergency Contact: Terra Scott  Mobile Phone: [phone]  Disposition: MICU while receiving SLED, can go to SDU when tolerating HD    OSBALDO Kiser   08/03/24 at 2:23 PM     Disclaimer: Documentation completed with the information available at the time of input. The times in the chart may not be reflective of actual patient care times, interventions, or procedures. Documentation occurs after the physical care of the patient.

## 2024-08-03 NOTE — CARE PLAN
Problem: Safety - Adult  Goal: Free from fall injury  Outcome: Progressing     Problem: Fall/Injury  Goal: Not fall by end of shift  Outcome: Progressing   The patient's goals for the shift include      The clinical goals for the shift include VS within Md ordered parameters    Over the shift, the patient did not make progress toward the following goals. Barriers to progression include prolonged hopitalization. Recommendations to address these barriers include per MD order.

## 2024-08-03 NOTE — PROGRESS NOTES
Edwar Hemphill is a 39 y.o. male on day 11 of admission presenting with Septic shock (Multi).    Subjective   Interval History:   -On pressors overnight, weaned by this AM but still low Bps  -Reports feeling ok overall  -Expected ongoing left wrist pain, does seem to be getting better  -Noted that he felt like his hearing was worse when he was hypotensive  -No f/c/sweats/back pain/joint pain otherwise  -No SOB    Review of Systems    Objective   Range of Vitals (last 24 hours)  Heart Rate:  [69-87]   Temp:  [36 °C (96.8 °F)-36.2 °C (97.2 °F)]   Resp:  [10-24]   BP: ()/(51-83)   SpO2:  [74 %-100 %]   Daily Weight  07/25/24 : 75.4 kg (166 lb 3.6 oz)    Body mass index is 24.54 kg/m².    Physical Exam  CONSTITUTIONAL: Chronically ill appearing, NAD, cooperative  EYES: PERRLA, EOMI, Sclera anicteric.  No conjunctival injection.  No petechial or embolic lesions  ENMT: MMM, No oral lesions or thrush.   CVS: RRR, S1 & S2 normal.  No murmurs, rubs or gallops appreciated.  Pedal pulses intact.  RESPIRATORY/CHEST: Clear anterolaterally.  No rales or rhonchi  GI: Soft, NT/ND.   No rebound or guarding  EXT: Left wrist in surgical dressing. Right LE with extensive, scaling skin. S/p RUE above elbow amputation, LLE AKA; sites appear c/d/i    Antibiotics  ceftaroline (Teflaro) IV in 50 mL D5W  vancomycin    Relevant Results  Labs  Results from last 72 hours   Lab Units 08/02/24  0520 08/01/24  2353 08/01/24  1754 08/01/24  1243 08/01/24  0743   WBC AUTO x10*3/uL 11.7* 12.1* 10.6 11.8* 10.1   HEMOGLOBIN g/dL 8.8* 8.9* 7.8* 7.8* 7.7*   HEMATOCRIT % 26.5* 26.9* 23.5* 24.2* 23.1*   PLATELETS AUTO x10*3/uL 41* 53* 56* 52* 49*   NEUTROS PCT AUTO % 78.7  --  76.1  --  77.3   LYMPHO PCT MAN %  --   --   --  6.9  --    LYMPHS PCT AUTO % 13.1  --  15.8  --  15.4   MONO PCT MAN %  --   --   --  2.6  --    MONOS PCT AUTO % 6.1  --  5.9  --  5.2   EOSINO PCT MAN %  --   --   --  0.0  --    EOS PCT AUTO % 0.4  --  0.7  --  0.5     Results  from last 72 hours   Lab Units 08/02/24  0520 08/01/24  0542 07/31/24  0542   SODIUM mmol/L 132* 133* 136   POTASSIUM mmol/L 4.9 4.6 4.7   CHLORIDE mmol/L 98 101 101   CO2 mmol/L 22 26 24   BUN mg/dL 22 15 24*   CREATININE mg/dL 3.23* 2.28* 3.15*   GLUCOSE mg/dL 333* 293* 32*   CALCIUM mg/dL 8.2* 7.9* 8.5*   ANION GAP mmol/L 17 11 16   EGFR mL/min/1.73m*2 24* 36* 25*   PHOSPHORUS mg/dL 5.2* 4.3 5.2*     Results from last 72 hours   Lab Units 08/02/24  0520 08/01/24  0542 07/31/24  0542   ALK PHOS U/L 218* 180* 189*   BILIRUBIN TOTAL mg/dL 0.6 0.7 0.9   PROTEIN TOTAL g/dL 5.2* 5.2* 5.6*   ALT U/L 19 22 25   AST U/L 21 24 16   ALBUMIN g/dL 2.4* 2.4* 2.4*     Estimated Creatinine Clearance: 30.4 mL/min (A) (by C-G formula based on SCr of 3.23 mg/dL (H)).  C-Reactive Protein   Date Value Ref Range Status   07/27/2024 6.44 (H) <1.00 mg/dL Final     CRP   Date Value Ref Range Status   07/24/2023 8.04 (A) mg/dL Final     Comment:     REF VALUE  < 1.00     07/24/2023 CANCELED       Comment:     Result canceled by the ancillary.     Microbiology  Susceptibility data from last 14 days.  Collected Specimen Info Organism Clindamycin Erythromycin Oxacillin Rifampin Tetracycline Trimethoprim/Sulfamethoxazole Vancomycin   07/27/24 Fluid from Synovial Methicillin Resistant Staphylococcus aureus (MRSA)  R  R  R   R  S  S   07/27/24 Blood culture from Peripheral Venipuncture Staphylococcus haemolyticus  R  R  R   S  R  S   07/25/24 Blood culture from Peripheral Venipuncture Staphylococcus aureus          07/25/24 Blood culture from Peripheral Venipuncture Staphylococcus aureus          07/22/24 Blood culture from Peripheral Venipuncture Methicillin Resistant Staphylococcus aureus (MRSA)  R  R  R  I  R  S  S   07/22/24 Blood culture from Peripheral Venipuncture Staphylococcus aureus          -07/22 Bcx: MRSA in 2/2, S bactrim and vanc  -07/25 Bcx: MRSA in 2/2  -07/27 Bcx: Staph haemolyticus (1/1)  -07/27 OR culture (L wrist):  MRSA  -07/29 Bcx: NG    Imaging  Reviewed in Epic.   TTE 7/23 with thickened TV and mobile densities associated with the TV with severe TR, not seen previously (TTE 5/14/24 with trivial TR and normal TV), suspicious for endocarditis.  Currently receiving Vancomycin and Ceftaroline for the Methicillin-resistant Staphylococcus aureus.  POLI 7/24 with EF 40 and global hypokinesis, large vegetation encasing TDC with involvement and destruction of septal leaflet of TV and severe TR. Small mobile echodensity on aortic valve, concerning for vegetation vs degenerative change. Moderate patent foramen ovale.     Assessment/Plan   39 y.o. male with ESRD on HD (MWF, right femoral line), HFrEF (EF 35-40% 5/2024), T2DM, PAD s/p L AKA and R above elbow amputation with multiple hospitalizations for L AKA infection and MRSA bacteremia who presented from SNF on 7/22 with hypotension.     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 Cultures cleared 7/29. Vegectomy not pursued ultimately due to comorbidities. CT surgery previously evaluated as well, not planned for surgical intervention for vegetation nor clot.    Recommendations:  -Appreciate discussions about dialysis catheter removal and/or replacement  -Continue vancomycin  -Continue ceftaroline  -If not a surgical candidate and/or cannot remove infected clot, will consider indefinite suppression with bactrim if he can tolerate it  -Will need to monitor hearing as his BP recovers  -Will follow    Please contact Team B via [employer] (me) or via pager [phone].    Doroteo Graham MD

## 2024-08-03 NOTE — PROGRESS NOTES
Edwar Hemphill is a 39 y.o. male on day 12 of admission presenting with Septic shock (Multi).    Subjective   Interval History:   -Ongoing minimal levo requirements 0.01  -Reports feeling ok overall  -Left wrist pain continues to improve  -Hearing is better today, maybe with higher Bps this AM  -No f/c/sweats/back pain/joint pain otherwise  -No SOB    Review of Systems    Objective   Range of Vitals (last 24 hours)  Heart Rate:  [69-87]   Temp:  [36 °C (96.8 °F)-36.8 °C (98.2 °F)]   Resp:  [11-17]   BP: ()/(34-79)   SpO2:  [89 %-100 %]   Daily Weight  07/25/24 : 75.4 kg (166 lb 3.6 oz)    Body mass index is 24.54 kg/m².    Physical Exam  CONSTITUTIONAL: Chronically ill appearing, NAD, cooperative  EYES: PERRLA, EOMI, Sclera anicteric.  No conjunctival injection.  No petechial or embolic lesions  ENMT: MMM, No oral lesions or thrush.   CVS: RRR, S1 & S2 normal.  No murmurs, rubs or gallops appreciated.  Pedal pulses intact.  RESPIRATORY/CHEST: Clear anteriorly.  No rales or rhonchi  GI: Soft, NT/ND.  No rebound or guarding  EXT: Left wrist in surgical dressing. Right LE with extensive, scaling skin. R fem line without surrounding erythema/induration/drainage. S/p RUE above elbow amputation, LLE AKA; sites appear c/d/i    Antibiotics  ceftaroline (Teflaro) IV in 50 mL D5W  vancomycin    Relevant Results  Labs  Results from last 72 hours   Lab Units 08/03/24  0604 08/02/24  0520 08/01/24  2353 08/01/24  1754 08/01/24  1243 08/01/24  0743   WBC AUTO x10*3/uL 8.4 11.7* 12.1* 10.6   < > 10.1   HEMOGLOBIN g/dL 7.9* 8.8* 8.9* 7.8*   < > 7.7*   HEMATOCRIT % 24.8* 26.5* 26.9* 23.5*   < > 23.1*   PLATELETS AUTO x10*3/uL 45* 41* 53* 56*   < > 49*   NEUTROS PCT AUTO %  --  78.7  --  76.1  --  77.3   LYMPHO PCT MAN % 8.7  --   --   --    < >  --    LYMPHS PCT AUTO %  --  13.1  --  15.8  --  15.4   MONO PCT MAN % 2.6  --   --   --    < >  --    MONOS PCT AUTO %  --  6.1  --  5.9  --  5.2   EOSINO PCT MAN % 0.0  --   --   --     < >  --    EOS PCT AUTO %  --  0.4  --  0.7  --  0.5    < > = values in this interval not displayed.     Results from last 72 hours   Lab Units 08/03/24  0604 08/02/24  0520 08/01/24  0542   SODIUM mmol/L 131* 132* 133*   POTASSIUM mmol/L 3.5 4.9 4.6   CHLORIDE mmol/L 96* 98 101   CO2 mmol/L 26 22 26   BUN mg/dL 12 22 15   CREATININE mg/dL 2.32* 3.23* 2.28*   GLUCOSE mg/dL 177* 333* 293*   CALCIUM mg/dL 8.1* 8.2* 7.9*   ANION GAP mmol/L 13 17 11   EGFR mL/min/1.73m*2 36* 24* 36*   PHOSPHORUS mg/dL 3.5 5.2* 4.3     Results from last 72 hours   Lab Units 08/03/24  0604 08/02/24  0520 08/01/24  0542   ALK PHOS U/L 194* 218* 180*   BILIRUBIN TOTAL mg/dL 0.8 0.6 0.7   PROTEIN TOTAL g/dL 5.1* 5.2* 5.2*   ALT U/L 14 19 22   AST U/L 12 21 24   ALBUMIN g/dL 2.3* 2.4* 2.4*     Estimated Creatinine Clearance: 42.3 mL/min (A) (by C-G formula based on SCr of 2.32 mg/dL (H)).  C-Reactive Protein   Date Value Ref Range Status   07/27/2024 6.44 (H) <1.00 mg/dL Final     CRP   Date Value Ref Range Status   07/24/2023 8.04 (A) mg/dL Final     Comment:     REF VALUE  < 1.00     07/24/2023 CANCELED       Comment:     Result canceled by the ancillary.     Microbiology  Susceptibility data from last 14 days.  Collected Specimen Info Organism Clindamycin Erythromycin Oxacillin Rifampin Tetracycline Trimethoprim/Sulfamethoxazole Vancomycin   07/27/24 Fluid from Synovial Methicillin Resistant Staphylococcus aureus (MRSA)  R  R  R   R  S  S   07/27/24 Blood culture from Peripheral Venipuncture Staphylococcus haemolyticus  R  R  R   S  R  S   07/25/24 Blood culture from Peripheral Venipuncture Staphylococcus aureus          07/25/24 Blood culture from Peripheral Venipuncture Staphylococcus aureus          07/22/24 Blood culture from Peripheral Venipuncture Methicillin Resistant Staphylococcus aureus (MRSA)  R  R  R  I  R  S  S   07/22/24 Blood culture from Peripheral Venipuncture Staphylococcus aureus          -07/22 Bcx: MRSA in 2/2, S  bactrim and vanc  -07/25 Bcx: MRSA in 2/2  -07/27 Bcx: Staph haemolyticus (1/1)  -07/27 OR culture (L wrist): MRSA  -07/29 Bcx: NG  -07/31 Bcx: NG    Imaging  Reviewed in Epic.   TTE 7/23 with thickened TV and mobile densities associated with the TV with severe TR, not seen previously (TTE 5/14/24 with trivial TR and normal TV), suspicious for endocarditis.  Currently receiving Vancomycin and Ceftaroline for the Methicillin-resistant Staphylococcus aureus.  POLI 7/24 with EF 40 and global hypokinesis, large vegetation encasing TDC with involvement and destruction of septal leaflet of TV and severe TR. Small mobile echodensity on aortic valve, concerning for vegetation vs degenerative change. Moderate patent foramen ovale.     Assessment/Plan   39 y.o. male with ESRD on HD (MWF, right femoral line), HFrEF (EF 35-40% 5/2024), T2DM, PAD s/p L AKA and R above elbow amputation with multiple hospitalizations for L AKA infection and MRSA bacteremia who presented from SNF on 7/22 with hypotension.     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 Cultures cleared 7/29. Vegectomy not pursued ultimately due to comorbidities. CT surgery previously evaluated as well, not planned for surgical intervention for vegetation nor clot.    Recommendations:  -Appreciate discussions about dialysis catheter removal and/or replacement  -Continue vancomycin  -Continue ceftaroline for now  -If not a surgical candidate and/or cannot remove infected clot, will consider indefinite suppression with bactrim if he can tolerate it  -Will need to monitor hearing as his BP recovers, seems better today. If we had to switch from vancomycin, ceftaroline would essentially be our only IV option up-front given history of dapto pneumonitis and linezolid not optimal in this scenario  -Will follow    Please contact Team B via [employer] (me)  or via pager [phone] with questions.    Doroteo Graham MD

## 2024-08-04 LAB
ABO GROUP (TYPE) IN BLOOD: NORMAL
ALBUMIN SERPL BCP-MCNC: 2.5 G/DL (ref 3.4–5)
ALP SERPL-CCNC: 212 U/L (ref 33–120)
ALT SERPL W P-5'-P-CCNC: 13 U/L (ref 10–52)
ANION GAP SERPL CALC-SCNC: 14 MMOL/L (ref 10–20)
ANTIBODY SCREEN: NORMAL
AST SERPL W P-5'-P-CCNC: 13 U/L (ref 9–39)
BACTERIA BLD CULT: NORMAL
BACTERIA BLD CULT: NORMAL
BASOPHILS # BLD AUTO: 0.1 X10*3/UL (ref 0–0.1)
BASOPHILS NFR BLD AUTO: 0.9 %
BILIRUB SERPL-MCNC: 0.7 MG/DL (ref 0–1.2)
BUN SERPL-MCNC: 18 MG/DL (ref 6–23)
CALCIUM SERPL-MCNC: 8.5 MG/DL (ref 8.6–10.6)
CHLORIDE SERPL-SCNC: 97 MMOL/L (ref 98–107)
CO2 SERPL-SCNC: 25 MMOL/L (ref 21–32)
CREAT SERPL-MCNC: 3.33 MG/DL (ref 0.5–1.3)
EGFRCR SERPLBLD CKD-EPI 2021: 23 ML/MIN/1.73M*2
EOSINOPHIL # BLD AUTO: 0.06 X10*3/UL (ref 0–0.7)
EOSINOPHIL NFR BLD AUTO: 0.5 %
ERYTHROCYTE [DISTWIDTH] IN BLOOD BY AUTOMATED COUNT: 21.8 % (ref 11.5–14.5)
ERYTHROCYTE [DISTWIDTH] IN BLOOD BY AUTOMATED COUNT: 21.9 % (ref 11.5–14.5)
GLUCOSE BLD MANUAL STRIP-MCNC: 113 MG/DL (ref 74–99)
GLUCOSE BLD MANUAL STRIP-MCNC: 139 MG/DL (ref 74–99)
GLUCOSE BLD MANUAL STRIP-MCNC: 239 MG/DL (ref 74–99)
GLUCOSE SERPL-MCNC: 142 MG/DL (ref 74–99)
HCT VFR BLD AUTO: 24.9 % (ref 41–52)
HCT VFR BLD AUTO: 25.3 % (ref 41–52)
HGB BLD-MCNC: 7.8 G/DL (ref 13.5–17.5)
HGB BLD-MCNC: 7.9 G/DL (ref 13.5–17.5)
HYPOCHROMIA BLD QL SMEAR: NORMAL
IMM GRANULOCYTES # BLD AUTO: 0.07 X10*3/UL (ref 0–0.7)
IMM GRANULOCYTES NFR BLD AUTO: 0.6 % (ref 0–0.9)
LYMPHOCYTES # BLD AUTO: 1.93 X10*3/UL (ref 1.2–4.8)
LYMPHOCYTES NFR BLD AUTO: 17.3 %
MAGNESIUM SERPL-MCNC: 1.84 MG/DL (ref 1.6–2.4)
MCH RBC QN AUTO: 29 PG (ref 26–34)
MCH RBC QN AUTO: 29.9 PG (ref 26–34)
MCHC RBC AUTO-ENTMCNC: 31.2 G/DL (ref 32–36)
MCHC RBC AUTO-ENTMCNC: 31.3 G/DL (ref 32–36)
MCV RBC AUTO: 93 FL (ref 80–100)
MCV RBC AUTO: 96 FL (ref 80–100)
MONOCYTES # BLD AUTO: 0.95 X10*3/UL (ref 0.1–1)
MONOCYTES NFR BLD AUTO: 8.5 %
NEUTROPHILS # BLD AUTO: 8.02 X10*3/UL (ref 1.2–7.7)
NEUTROPHILS NFR BLD AUTO: 72.2 %
NRBC BLD-RTO: 0.2 /100 WBCS (ref 0–0)
NRBC BLD-RTO: 0.2 /100 WBCS (ref 0–0)
PHOSPHATE SERPL-MCNC: 4.4 MG/DL (ref 2.5–4.9)
PLATELET # BLD AUTO: 55 X10*3/UL (ref 150–450)
PLATELET # BLD AUTO: 59 X10*3/UL (ref 150–450)
POLYCHROMASIA BLD QL SMEAR: NORMAL
POTASSIUM SERPL-SCNC: 3.8 MMOL/L (ref 3.5–5.3)
PROT SERPL-MCNC: 5.5 G/DL (ref 6.4–8.2)
RBC # BLD AUTO: 2.64 X10*6/UL (ref 4.5–5.9)
RBC # BLD AUTO: 2.69 X10*6/UL (ref 4.5–5.9)
RBC MORPH BLD: NORMAL
RH FACTOR (ANTIGEN D): NORMAL
SODIUM SERPL-SCNC: 132 MMOL/L (ref 136–145)
T4 FREE SERPL-MCNC: 1.04 NG/DL (ref 0.78–1.48)
UFH PPP CHRO-ACNC: 0.5 IU/ML
VANCOMYCIN SERPL-MCNC: 17.5 UG/ML (ref 5–20)
WBC # BLD AUTO: 11.1 X10*3/UL (ref 4.4–11.3)
WBC # BLD AUTO: 11.1 X10*3/UL (ref 4.4–11.3)

## 2024-08-04 PROCEDURE — 84100 ASSAY OF PHOSPHORUS: CPT

## 2024-08-04 PROCEDURE — 85025 COMPLETE CBC W/AUTO DIFF WBC: CPT

## 2024-08-04 PROCEDURE — 2500000001 HC RX 250 WO HCPCS SELF ADMINISTERED DRUGS (ALT 637 FOR MEDICARE OP)

## 2024-08-04 PROCEDURE — 36415 COLL VENOUS BLD VENIPUNCTURE: CPT

## 2024-08-04 PROCEDURE — 80202 ASSAY OF VANCOMYCIN: CPT | Performed by: INTERNAL MEDICINE

## 2024-08-04 PROCEDURE — 2060000001 HC INTERMEDIATE ICU ROOM DAILY

## 2024-08-04 PROCEDURE — 83735 ASSAY OF MAGNESIUM: CPT

## 2024-08-04 PROCEDURE — 80053 COMPREHEN METABOLIC PANEL: CPT

## 2024-08-04 PROCEDURE — 85520 HEPARIN ASSAY: CPT

## 2024-08-04 PROCEDURE — 86901 BLOOD TYPING SEROLOGIC RH(D): CPT

## 2024-08-04 PROCEDURE — 2500000004 HC RX 250 GENERAL PHARMACY W/ HCPCS (ALT 636 FOR OP/ED)

## 2024-08-04 PROCEDURE — 2500000002 HC RX 250 W HCPCS SELF ADMINISTERED DRUGS (ALT 637 FOR MEDICARE OP, ALT 636 FOR OP/ED): Performed by: NURSE PRACTITIONER

## 2024-08-04 PROCEDURE — 82947 ASSAY GLUCOSE BLOOD QUANT: CPT

## 2024-08-04 PROCEDURE — 99291 CRITICAL CARE FIRST HOUR: CPT | Performed by: NURSE PRACTITIONER

## 2024-08-04 PROCEDURE — 84439 ASSAY OF FREE THYROXINE: CPT

## 2024-08-04 RX ORDER — LEVOTHYROXINE SODIUM 50 UG/1
150 TABLET ORAL
Status: DISCONTINUED | OUTPATIENT
Start: 2024-08-05 | End: 2024-08-22 | Stop reason: HOSPADM

## 2024-08-04 ASSESSMENT — PAIN - FUNCTIONAL ASSESSMENT
PAIN_FUNCTIONAL_ASSESSMENT: 0-10

## 2024-08-04 ASSESSMENT — PAIN SCALES - GENERAL
PAINLEVEL_OUTOF10: 0 - NO PAIN

## 2024-08-04 NOTE — PROGRESS NOTES
Vancomycin Dosing by Pharmacy- FOLLOW UP    Edwar Hemphill is a 39 y.o. year old male who Pharmacy has been consulted for vancomycin dosing for other bacteremia . Based on the patient's indication and renal status this patient is being dosed based on a goal trough/random level of 15-20.     Renal function is currently unstable. Previously on CVVH + iHD. Received SLED session on 8/2 PM which was stopped early. No plans on RRT on 8/3 per nephrology     Most recent random level: 17.2 mcg/mL (Last dose 500 mg x1 on )     Visit Vitals  /75   Pulse 90   Temp 36.5 °C (97.7 °F) (Temporal)   Resp 19        Lab Results   Component Value Date    CREATININE 2.32 (H) 2024    CREATININE 3.23 (H) 2024    CREATININE 2.28 (H) 2024    CREATININE 3.15 (H) 2024        Patient weight is as follows:   Vitals:    24 0500   Weight: 75.4 kg (166 lb 3.6 oz)       Cultures:  Susceptibility data for the encounter in last 14 days.  Collected Specimen Info Organism Clindamycin Erythromycin Oxacillin Rifampin Tetracycline Trimethoprim/Sulfamethoxazole Vancomycin   24 Fluid from Synovial Methicillin Resistant Staphylococcus aureus (MRSA)  R  R  R   R  S  S   24 Blood culture from Peripheral Venipuncture Staphylococcus haemolyticus  R  R  R   S  R  S   24 Blood culture from Peripheral Venipuncture Staphylococcus aureus          24 Blood culture from Peripheral Venipuncture Staphylococcus aureus          24 Blood culture from Peripheral Venipuncture Methicillin Resistant Staphylococcus aureus (MRSA)  R  R  R  I  R  S  S   24 Blood culture from Peripheral Venipuncture Staphylococcus aureus               I/O last 3 completed shifts:  In: 742.9 (10.5 mL/kg) [I.V.:587.9 (8.3 mL/kg); IV Piggyback:155]  Out: 780 (11 mL/kg) [Other:780]  Dosing Weight: 70.9 kg   I/O during current shift:  No intake/output data recorded.    Temp (24hrs), Av.6 °C (97.8 °F), Min:36.4 °C (97.5 °F),  Max:36.8 °C (98.2 °F)      Assessment/Plan    Within goal random/trough level. Will hold dose for today as still within therapeutic range from last dose on 7/31. Will dose per random level at this time as no current plans for RRT.     The next level will be obtained on 8/4 at AM labs. May be obtained sooner if clinically indicated.   Will continue to monitor renal function daily while on vancomycin and order serum creatinine at least every 48 hours if not already ordered.  Follow for continued vancomycin needs, clinical response, and signs/symptoms of toxicity.       Gerardo Graham, PharmD

## 2024-08-04 NOTE — PROGRESS NOTES
Vancomycin Dosing by Pharmacy- FOLLOW UP    Edwar Hemphill is a 39 y.o. year old male who Pharmacy has been consulted for vancomycin dosing for other bacteremia . Based on the patient's indication and renal status this patient is being dosed based on a goal trough/random level of 15-20.     Renal function is currently unstable. Previously on CVVH + iHD. Received SLED session on 8/2 PM which was stopped early. No current plans for RRT    Most recent random level: 17.5 mcg/mL (Last dose 500 mg x1 on )     Visit Vitals  /77   Pulse 76   Temp 36.4 °C (97.5 °F) (Temporal)   Resp 15        Lab Results   Component Value Date    CREATININE 2.32 (H) 2024    CREATININE 3.23 (H) 2024    CREATININE 2.28 (H) 2024    CREATININE 3.15 (H) 2024        Patient weight is as follows:   Vitals:    24 0500   Weight: 75.4 kg (166 lb 3.6 oz)       Cultures:  Susceptibility data for the encounter in last 14 days.  Collected Specimen Info Organism Clindamycin Erythromycin Oxacillin Rifampin Tetracycline Trimethoprim/Sulfamethoxazole Vancomycin   24 Fluid from Synovial Methicillin Resistant Staphylococcus aureus (MRSA)  R  R  R   R  S  S   24 Blood culture from Peripheral Venipuncture Staphylococcus haemolyticus  R  R  R   S  R  S   24 Blood culture from Peripheral Venipuncture Staphylococcus aureus          24 Blood culture from Peripheral Venipuncture Staphylococcus aureus          24 Blood culture from Peripheral Venipuncture Methicillin Resistant Staphylococcus aureus (MRSA)  R  R  R  I  R  S  S   24 Blood culture from Peripheral Venipuncture Staphylococcus aureus               I/O last 3 completed shifts:  In: 903.8 (12.7 mL/kg) [P.O.:200; I.V.:503.8 (7.1 mL/kg); IV Piggyback:200]  Out: - (0 mL/kg)   Dosing Weight: 70.9 kg   I/O during current shift:  No intake/output data recorded.    Temp (24hrs), Av.4 °C (97.5 °F), Min:35.6 °C (96.1 °F), Max:36.9 °C (98.4  °F)      Assessment/Plan    Within goal random/trough level. Will hold dose for today as still within therapeutic range from last dose on 7/31. Will dose per random level at this time as no current plans for RRT.     The next level will be obtained on 8/5 at AM labs. May be obtained sooner if clinically indicated.   Will continue to monitor renal function daily while on vancomycin and order serum creatinine at least every 48 hours if not already ordered.  Follow for continued vancomycin needs, clinical response, and signs/symptoms of toxicity.       Gerardo Graham, PharmD

## 2024-08-04 NOTE — CARE PLAN
Problem: Skin  Goal: Decreased wound size/increased tissue granulation at next dressing change  Outcome: Progressing  Flowsheets (Taken 8/4/2024 0644)  Decreased wound size/increased tissue granulation at next dressing change: Protective dressings over bony prominences  Goal: Participates in plan/prevention/treatment measures  Outcome: Progressing  Flowsheets (Taken 8/4/2024 0644)  Participates in plan/prevention/treatment measures: Elevate heels  Goal: Prevent/manage excess moisture  Outcome: Progressing  Flowsheets (Taken 8/4/2024 0644)  Prevent/manage excess moisture:   Cleanse incontinence/protect with barrier cream   Monitor for/manage infection if present   Follow provider orders for dressing changes   Moisturize dry skin  Goal: Prevent/minimize sheer/friction injuries  Outcome: Progressing  Flowsheets (Taken 8/4/2024 0644)  Prevent/minimize sheer/friction injuries: HOB 30 degrees or less  Goal: Promote skin healing  Outcome: Progressing  Flowsheets (Taken 8/4/2024 0644)  Promote skin healing:   Protective dressings over bony prominences   Assess skin/pad under line(s)/device(s)

## 2024-08-04 NOTE — PROGRESS NOTES
"Medical Intensive Care - Daily Progress Note   Subjective    Edwar Hemphill is a 39 y.o. year old male patient admitted on 7/22/2024 with following ICU needs: hypotension requiring vasopressors    Interval History:  Remains on Norepi    Meds    Scheduled medications  atorvastatin, 40 mg, oral, Nightly  B complex-vitamin C-folic acid, 1 capsule, oral, Daily  ceftaroline, 200 mg, intravenous, q8h  cholecalciferol, 50,000 Units, oral, Once per day on Monday Thursday  epoetin tyson or biosimilar, 10,000 Units, subcutaneous, Once per day on Monday Wednesday Friday  folic acid, 1 mg, oral, Daily  insulin glargine, 8 Units, subcutaneous, Nightly  insulin lispro, 0-10 Units, subcutaneous, TID  [START ON 8/5/2024] levothyroxine, 150 mcg, oral, Daily before breakfast  melatonin, 6 mg, oral, Nightly  midodrine, 20 mg, oral, q8h  nystatin, 1 Application, Topical, BID  pantoprazole, 40 mg, oral, Daily before breakfast  thiamine, 200 mg, oral, Daily      Continuous medications  heparin, 0-4,500 Units/hr, Last Rate: 1,700 Units/hr (08/04/24 0800)  norepinephrine, 0.01-1 mcg/kg/min (Dosing Weight), Last Rate: 0.01 mcg/kg/min (08/04/24 1000)      PRN medications  PRN medications: acetaminophen **OR** acetaminophen **OR** acetaminophen, dextrose, dextrose, diphenhydrAMINE, glucagon, glucagon, heparin, heparin, lidocaine, oxyCODONE, vancomycin     Objective    Blood pressure 113/71, pulse 77, temperature 36.1 °C (97 °F), temperature source Temporal, resp. rate 12, height 1.753 m (5' 9.02\"), weight 75.4 kg (166 lb 3.6 oz), SpO2 (!) 88%.     Physical Exam  Constitutional:       Appearance: He is ill-appearing.   HENT:      Mouth/Throat:      Mouth: Mucous membranes are moist.   Eyes:      Conjunctiva/sclera: Conjunctivae normal.   Cardiovascular:      Rate and Rhythm: Normal rate and regular rhythm.      Heart sounds: Normal heart sounds.   Pulmonary:      Effort: Pulmonary effort is normal.      Breath sounds: Normal breath sounds. "   Abdominal:      General: Abdomen is flat. Bowel sounds are normal.   Musculoskeletal:      Cervical back: Normal range of motion.      Comments: RUE amputation, LUE swelling with ACE wrap      Left Lower Extremity: Left leg is amputated above knee.   Skin:     General: Skin is warm and dry.            Intake/Output Summary (Last 24 hours) at 8/4/2024 1205  Last data filed at 8/4/2024 0600  Gross per 24 hour   Intake 753.76 ml   Output --   Net 753.76 ml     Labs:   Results from last 72 hours   Lab Units 08/04/24 0448 08/03/24  0604 08/02/24  0520   SODIUM mmol/L 132* 131* 132*   POTASSIUM mmol/L 3.8 3.5 4.9   CHLORIDE mmol/L 97* 96* 98   CO2 mmol/L 25 26 22   BUN mg/dL 18 12 22   CREATININE mg/dL 3.33* 2.32* 3.23*   GLUCOSE mg/dL 142* 177* 333*   CALCIUM mg/dL 8.5* 8.1* 8.2*   ANION GAP mmol/L 14 13 17   EGFR mL/min/1.73m*2 23* 36* 24*   PHOSPHORUS mg/dL 4.4 3.5 5.2*      Results from last 72 hours   Lab Units 08/04/24 0448 08/03/24 2358 08/03/24  0604 08/02/24  0520 08/01/24  2353 08/01/24  1754   WBC AUTO x10*3/uL 11.1 11.1 8.4 11.7*   < > 10.6   HEMOGLOBIN g/dL 7.8* 7.9* 7.9* 8.8*   < > 7.8*   HEMATOCRIT % 24.9* 25.3* 24.8* 26.5*   < > 23.5*   PLATELETS AUTO x10*3/uL 55* 59* 45* 41*   < > 56*   NEUTROS PCT AUTO % 72.2  --   --  78.7  --  76.1   LYMPHO PCT MAN %  --   --  8.7  --   --   --    LYMPHS PCT AUTO % 17.3  --   --  13.1  --  15.8   MONO PCT MAN %  --   --  2.6  --   --   --    MONOS PCT AUTO % 8.5  --   --  6.1  --  5.9   EOSINO PCT MAN %  --   --  0.0  --   --   --    EOS PCT AUTO % 0.5  --   --  0.4  --  0.7    < > = values in this interval not displayed.        Micro/ID:     Lab Results   Component Value Date    BLOODCULT No growth at 3 days 07/31/2024    BLOODCULT No growth at 3 days 07/31/2024       Summary of key imaging results from the last 24 hours  NA    Assessment and Plan     Assessment: Edwar Hemphill is a 39 y.o. year old male patient admitted on 7/22/2024 with septic shock 2/2 infective  endocarditis.    Mechanical Ventilation: none  Sedation/Analgesia:  none  Restraints: no    Summary for 08/04/24  :  Wean levo    Plan:  NEUROLOGY/PSYCH:  Dx:  Flat affect  Has left AMA in past  Management:  Pain management with tylenol and oxy    CARDIOVASCULAR:  Dx:  Infective endocarditis with TV vegetation, Severely attenuated superior vena cava, bilateral brachiocephalic, subclavian and internal jugular veins with the calcifications in the brachiocephalic veins with extensive collaterals in the chest wall likely representing chronic thrombosis. Patient had vegectomy aborted due to to concerns for infected thrombus in the IVC.  Persistent hypotension.  Back on norepi   Management:  Map >65  Midodrine 20mg every 8 hours    PULMONARY:  Dx:  NAVI  Management:  NA    RENAL/GENITOURINARY:  Dx:  ERSD on MWF HD via R fem HD line (IR placed)  Unable to tolerate HD as of now  Management:  SLED vs IHD per Nephro  Hold of on IR replacement of HD line (recent cultures clear)    GASTROENTEROLOGY:  Dx:  Esophagitis, GERD  Tolerating oral diet  Management:  PPI    ENDOCRINOLOGY:  Dx: DM 1   - glargine 8 units   - sliding scale insulin    HEMATOLOGY:  Dx:  Anemia of chronic disease, thrombocytopenia  No concern for KALLIE at this time  Management:  Heparin gtt  Daily CBC    MUSCULOSKELETAL/ SKIN:  Dx:  Septic arthritis of Left wrist  Culture growing MRSA  Management:  Vanco/ceftaroline    INFECTIOUS DISEASE:  Dx:  MRSA, Staph haemolyticus bacteremia.  Hx of Acinetobacter  Culture from 7/29 negative, Culture from 7/31 NGTD  Management:  ID following  Vanco/Ceftaroline    ICU Check List       ICU Liberation: Intervention:   Assess, Prevent, Manage Pain CPOT - NA   Both SAT and SBT [] SAT  [] SBT 30-60 min [] Extubate to NC  [] Extubate to NIV  [] Discuss Trach   Choice of analgesia and sedation RASS: 0  none NA   Delirium: Assess, prevent and manage CAM - NA   Early Mobility and Exercise  [x] PT /OT consult   Family Engagement and  Empowerment []Family updated [x]SW consult     FEN  Fluids: PRN  Electrolytes: HD  Nutrition: Oral diet  Prophylaxis:  DVT ppx: heparin gtt  GI ppx: PPI  Bowel care: NA  Hardware:  Catheter: NA  Access: R fem dialysis line  Drains: NA  Social:  Code: Full Code    HPOA: Shanthi Hemphill - Mobile Phone: [phone]  Secondary Emergency Contact: Sonia,Terra  Mobile Phone: [phone]  Disposition: MICU while receiving SLED, can go to SDU when tolerating HD    OSBALDO Kiser   08/04/24 at 12:05 PM     Disclaimer: Documentation completed with the information available at the time of input. The times in the chart may not be reflective of actual patient care times, interventions, or procedures. Documentation occurs after the physical care of the patient.

## 2024-08-05 VITALS
WEIGHT: 166.23 LBS | DIASTOLIC BLOOD PRESSURE: 57 MMHG | RESPIRATION RATE: 13 BRPM | OXYGEN SATURATION: 100 % | TEMPERATURE: 97.9 F | BODY MASS INDEX: 24.62 KG/M2 | HEART RATE: 74 BPM | HEIGHT: 69 IN | SYSTOLIC BLOOD PRESSURE: 93 MMHG

## 2024-08-05 LAB
ALBUMIN SERPL BCP-MCNC: 2.4 G/DL (ref 3.4–5)
ALP SERPL-CCNC: 211 U/L (ref 33–120)
ALT SERPL W P-5'-P-CCNC: 13 U/L (ref 10–52)
ANION GAP SERPL CALC-SCNC: 13 MMOL/L (ref 10–20)
AST SERPL W P-5'-P-CCNC: 17 U/L (ref 9–39)
BASOPHILS # BLD AUTO: 0.1 X10*3/UL (ref 0–0.1)
BASOPHILS NFR BLD AUTO: 1.1 %
BILIRUB SERPL-MCNC: 0.6 MG/DL (ref 0–1.2)
BUN SERPL-MCNC: 25 MG/DL (ref 6–23)
CALCIUM SERPL-MCNC: 8.3 MG/DL (ref 8.6–10.6)
CHLORIDE SERPL-SCNC: 96 MMOL/L (ref 98–107)
CO2 SERPL-SCNC: 25 MMOL/L (ref 21–32)
CREAT SERPL-MCNC: 4.21 MG/DL (ref 0.5–1.3)
EGFRCR SERPLBLD CKD-EPI 2021: 17 ML/MIN/1.73M*2
EOSINOPHIL # BLD AUTO: 0.07 X10*3/UL (ref 0–0.7)
EOSINOPHIL NFR BLD AUTO: 0.8 %
ERYTHROCYTE [DISTWIDTH] IN BLOOD BY AUTOMATED COUNT: 21.1 % (ref 11.5–14.5)
FUNGUS SPEC CULT: NORMAL
FUNGUS SPEC CULT: NORMAL
FUNGUS SPEC FUNGUS STN: NORMAL
FUNGUS SPEC FUNGUS STN: NORMAL
GLUCOSE BLD MANUAL STRIP-MCNC: 146 MG/DL (ref 74–99)
GLUCOSE BLD MANUAL STRIP-MCNC: 150 MG/DL (ref 74–99)
GLUCOSE BLD MANUAL STRIP-MCNC: 184 MG/DL (ref 74–99)
GLUCOSE BLD MANUAL STRIP-MCNC: 193 MG/DL (ref 74–99)
GLUCOSE BLD MANUAL STRIP-MCNC: 393 MG/DL (ref 74–99)
GLUCOSE BLD MANUAL STRIP-MCNC: 93 MG/DL (ref 74–99)
GLUCOSE SERPL-MCNC: 392 MG/DL (ref 74–99)
HCT VFR BLD AUTO: 21.5 % (ref 41–52)
HGB BLD-MCNC: 7.3 G/DL (ref 13.5–17.5)
HYPOCHROMIA BLD QL SMEAR: NORMAL
IMM GRANULOCYTES # BLD AUTO: 0.08 X10*3/UL (ref 0–0.7)
IMM GRANULOCYTES NFR BLD AUTO: 0.9 % (ref 0–0.9)
LYMPHOCYTES # BLD AUTO: 1.29 X10*3/UL (ref 1.2–4.8)
LYMPHOCYTES NFR BLD AUTO: 14.5 %
MAGNESIUM SERPL-MCNC: 1.89 MG/DL (ref 1.6–2.4)
MCH RBC QN AUTO: 29.8 PG (ref 26–34)
MCHC RBC AUTO-ENTMCNC: 34 G/DL (ref 32–36)
MCV RBC AUTO: 88 FL (ref 80–100)
MONOCYTES # BLD AUTO: 0.68 X10*3/UL (ref 0.1–1)
MONOCYTES NFR BLD AUTO: 7.7 %
NEUTROPHILS # BLD AUTO: 6.65 X10*3/UL (ref 1.2–7.7)
NEUTROPHILS NFR BLD AUTO: 75 %
NRBC BLD-RTO: 0 /100 WBCS (ref 0–0)
PHOSPHATE SERPL-MCNC: 4.8 MG/DL (ref 2.5–4.9)
PLATELET # BLD AUTO: 42 X10*3/UL (ref 150–450)
POLYCHROMASIA BLD QL SMEAR: NORMAL
POTASSIUM SERPL-SCNC: 4.4 MMOL/L (ref 3.5–5.3)
PROT SERPL-MCNC: 5.4 G/DL (ref 6.4–8.2)
RBC # BLD AUTO: 2.45 X10*6/UL (ref 4.5–5.9)
RBC MORPH BLD: NORMAL
SODIUM SERPL-SCNC: 130 MMOL/L (ref 136–145)
UFH PPP CHRO-ACNC: 0.4 IU/ML
VANCOMYCIN SERPL-MCNC: 18.6 UG/ML (ref 5–20)
WBC # BLD AUTO: 8.9 X10*3/UL (ref 4.4–11.3)

## 2024-08-05 PROCEDURE — 85025 COMPLETE CBC W/AUTO DIFF WBC: CPT

## 2024-08-05 PROCEDURE — 2500000001 HC RX 250 WO HCPCS SELF ADMINISTERED DRUGS (ALT 637 FOR MEDICARE OP)

## 2024-08-05 PROCEDURE — 2500000004 HC RX 250 GENERAL PHARMACY W/ HCPCS (ALT 636 FOR OP/ED)

## 2024-08-05 PROCEDURE — 2060000001 HC INTERMEDIATE ICU ROOM DAILY

## 2024-08-05 PROCEDURE — 82947 ASSAY GLUCOSE BLOOD QUANT: CPT

## 2024-08-05 PROCEDURE — 80202 ASSAY OF VANCOMYCIN: CPT | Performed by: INTERNAL MEDICINE

## 2024-08-05 PROCEDURE — 6350000001 HC RX 635 EPOETIN >10,000 UNITS: Mod: JZ

## 2024-08-05 PROCEDURE — 99291 CRITICAL CARE FIRST HOUR: CPT

## 2024-08-05 PROCEDURE — 97110 THERAPEUTIC EXERCISES: CPT | Mod: GP

## 2024-08-05 PROCEDURE — 2500000002 HC RX 250 W HCPCS SELF ADMINISTERED DRUGS (ALT 637 FOR MEDICARE OP, ALT 636 FOR OP/ED)

## 2024-08-05 PROCEDURE — 36415 COLL VENOUS BLD VENIPUNCTURE: CPT

## 2024-08-05 PROCEDURE — 2500000002 HC RX 250 W HCPCS SELF ADMINISTERED DRUGS (ALT 637 FOR MEDICARE OP, ALT 636 FOR OP/ED): Performed by: NURSE PRACTITIONER

## 2024-08-05 PROCEDURE — 84100 ASSAY OF PHOSPHORUS: CPT

## 2024-08-05 PROCEDURE — 80053 COMPREHEN METABOLIC PANEL: CPT

## 2024-08-05 PROCEDURE — 83735 ASSAY OF MAGNESIUM: CPT

## 2024-08-05 PROCEDURE — 99232 SBSQ HOSP IP/OBS MODERATE 35: CPT | Performed by: INTERNAL MEDICINE

## 2024-08-05 PROCEDURE — 99232 SBSQ HOSP IP/OBS MODERATE 35: CPT | Performed by: STUDENT IN AN ORGANIZED HEALTH CARE EDUCATION/TRAINING PROGRAM

## 2024-08-05 PROCEDURE — 85520 HEPARIN ASSAY: CPT

## 2024-08-05 RX ORDER — HYDROMORPHONE HYDROCHLORIDE 1 MG/ML
0.4 INJECTION, SOLUTION INTRAMUSCULAR; INTRAVENOUS; SUBCUTANEOUS ONCE
Status: COMPLETED | OUTPATIENT
Start: 2024-08-05 | End: 2024-08-05

## 2024-08-05 RX ORDER — OXYCODONE HYDROCHLORIDE 5 MG/1
5 TABLET ORAL EVERY 4 HOURS PRN
Status: DISCONTINUED | OUTPATIENT
Start: 2024-08-05 | End: 2024-08-22 | Stop reason: HOSPADM

## 2024-08-05 RX ORDER — INSULIN LISPRO 100 [IU]/ML
0-15 INJECTION, SOLUTION INTRAVENOUS; SUBCUTANEOUS
Status: DISCONTINUED | OUTPATIENT
Start: 2024-08-05 | End: 2024-08-05

## 2024-08-05 RX ORDER — INSULIN LISPRO 100 [IU]/ML
0-15 INJECTION, SOLUTION INTRAVENOUS; SUBCUTANEOUS
Status: DISCONTINUED | OUTPATIENT
Start: 2024-08-06 | End: 2024-08-11

## 2024-08-05 ASSESSMENT — PAIN DESCRIPTION - ORIENTATION
ORIENTATION: LEFT

## 2024-08-05 ASSESSMENT — COGNITIVE AND FUNCTIONAL STATUS - GENERAL
STANDING UP FROM CHAIR USING ARMS: TOTAL
MOVING FROM LYING ON BACK TO SITTING ON SIDE OF FLAT BED WITH BEDRAILS: TOTAL
CLIMB 3 TO 5 STEPS WITH RAILING: TOTAL
TURNING FROM BACK TO SIDE WHILE IN FLAT BAD: TOTAL
MOVING TO AND FROM BED TO CHAIR: TOTAL
WALKING IN HOSPITAL ROOM: TOTAL
MOBILITY SCORE: 6

## 2024-08-05 ASSESSMENT — PAIN - FUNCTIONAL ASSESSMENT
PAIN_FUNCTIONAL_ASSESSMENT: 0-10

## 2024-08-05 ASSESSMENT — PAIN DESCRIPTION - LOCATION
LOCATION: WRIST

## 2024-08-05 ASSESSMENT — PAIN SCALES - GENERAL
PAINLEVEL_OUTOF10: 5 - MODERATE PAIN
PAINLEVEL_OUTOF10: 5 - MODERATE PAIN
PAINLEVEL_OUTOF10: 0 - NO PAIN
PAINLEVEL_OUTOF10: 8
PAINLEVEL_OUTOF10: 0 - NO PAIN
PAINLEVEL_OUTOF10: 9
PAINLEVEL_OUTOF10: 8
PAINLEVEL_OUTOF10: 4
PAINLEVEL_OUTOF10: 8
PAINLEVEL_OUTOF10: 0 - NO PAIN
PAINLEVEL_OUTOF10: 0 - NO PAIN

## 2024-08-05 NOTE — PROGRESS NOTES
Edwar Hemphill   39 y.oLorraine    @WT@  N/Room: 01701320/24/24-A    Subjective:  No acute events overnight.     Objective:   On/Off pressers  LABSI controlled for now.    Meds:   atorvastatin, 40 mg, Nightly  B complex-vitamin C-folic acid, 1 capsule, Daily  ceftaroline, 200 mg, q8h  cholecalciferol, 50,000 Units, Once per day on Monday Thursday  epoetin tyson or biosimilar, 10,000 Units, Once per day on Monday Wednesday Friday  folic acid, 1 mg, Daily  insulin glargine, 8 Units, Nightly  insulin regular, 0-10 Units, Once  levothyroxine, 150 mcg, Daily before breakfast  melatonin, 6 mg, Nightly  midodrine, 20 mg, q8h  nystatin, 1 Application, BID  pantoprazole, 40 mg, Daily before breakfast  thiamine, 200 mg, Daily      heparin, Last Rate: 1,700 Units/hr (08/05/24 0927)  insulin regular infusion, Last Rate: 2 Units/hr (08/05/24 1357)  norepinephrine, Last Rate: Stopped (08/04/24 1900)      acetaminophen, 650 mg, q4h PRN  dextrose, 12.5 g, q15 min PRN  dextrose, 25 g, q15 min PRN  diphenhydrAMINE, 25 mg, q6h PRN  glucagon, 1 mg, q15 min PRN  glucagon, 1 mg, q15 min PRN  heparin, 1,000 Units, PRN  heparin, 1,000 Units, PRN  insulin regular, 2-6 Units, PRN  lidocaine, , 4x daily PRN  oxyCODONE, 5 mg, q4h PRN  vancomycin, , Daily PRN        Vitals:    08/05/24 1400   BP: 112/67   Pulse: 78   Resp: 17   Temp:    SpO2:           Intake/Output Summary (Last 24 hours) at 8/5/2024 1436  Last data filed at 8/5/2024 0950  Gross per 24 hour   Intake 580.61 ml   Output --   Net 580.61 ml       General appearance: no distress, lethargic  Heart: [id number]  Lungs: CTA bilat  no crepitations  Abdomen: soft, nt/nd  Extremities: trace edema bilat  Access: femoral TDC     Blood Labs:  Results for orders placed or performed during the hospital encounter of 07/22/24 (from the past 24 hour(s))   POCT GLUCOSE   Result Value Ref Range    POCT Glucose 239 (H) 74 - 99 mg/dL   CBC and Auto Differential   Result Value Ref Range    WBC 8.9 4.4 - 11.3  x10*3/uL    nRBC 0.0 0.0 - 0.0 /100 WBCs    RBC 2.45 (L) 4.50 - 5.90 x10*6/uL    Hemoglobin 7.3 (L) 13.5 - 17.5 g/dL    Hematocrit 21.5 (L) 41.0 - 52.0 %    MCV 88 80 - 100 fL    MCH 29.8 26.0 - 34.0 pg    MCHC 34.0 32.0 - 36.0 g/dL    RDW 21.1 (H) 11.5 - 14.5 %    Platelets 42 (L) 150 - 450 x10*3/uL    Neutrophils % 75.0 40.0 - 80.0 %    Immature Granulocytes %, Automated 0.9 0.0 - 0.9 %    Lymphocytes % 14.5 13.0 - 44.0 %    Monocytes % 7.7 2.0 - 10.0 %    Eosinophils % 0.8 0.0 - 6.0 %    Basophils % 1.1 0.0 - 2.0 %    Neutrophils Absolute 6.65 1.20 - 7.70 x10*3/uL    Immature Granulocytes Absolute, Automated 0.08 0.00 - 0.70 x10*3/uL    Lymphocytes Absolute 1.29 1.20 - 4.80 x10*3/uL    Monocytes Absolute 0.68 0.10 - 1.00 x10*3/uL    Eosinophils Absolute 0.07 0.00 - 0.70 x10*3/uL    Basophils Absolute 0.10 0.00 - 0.10 x10*3/uL   Comprehensive Metabolic Panel   Result Value Ref Range    Glucose 392 (H) 74 - 99 mg/dL    Sodium 130 (L) 136 - 145 mmol/L    Potassium 4.4 3.5 - 5.3 mmol/L    Chloride 96 (L) 98 - 107 mmol/L    Bicarbonate 25 21 - 32 mmol/L    Anion Gap 13 10 - 20 mmol/L    Urea Nitrogen 25 (H) 6 - 23 mg/dL    Creatinine 4.21 (H) 0.50 - 1.30 mg/dL    eGFR 17 (L) >60 mL/min/1.73m*2    Calcium 8.3 (L) 8.6 - 10.6 mg/dL    Albumin 2.4 (L) 3.4 - 5.0 g/dL    Alkaline Phosphatase 211 (H) 33 - 120 U/L    Total Protein 5.4 (L) 6.4 - 8.2 g/dL    AST 17 9 - 39 U/L    Bilirubin, Total 0.6 0.0 - 1.2 mg/dL    ALT 13 10 - 52 U/L   Magnesium   Result Value Ref Range    Magnesium 1.89 1.60 - 2.40 mg/dL   Phosphorus   Result Value Ref Range    Phosphorus 4.8 2.5 - 4.9 mg/dL   Vancomycin   Result Value Ref Range    Vancomycin 18.6 5.0 - 20.0 ug/mL   Morphology   Result Value Ref Range    RBC Morphology See Below     Polychromasia Mild     Hypochromia Mild    Heparin Assay, UFH   Result Value Ref Range    Heparin Unfractionated 0.4 See Comment Below for Therapeutic Ranges IU/mL   POCT GLUCOSE   Result Value Ref Range    POCT  Glucose 393 (H) 74 - 99 mg/dL   POCT GLUCOSE   Result Value Ref Range    POCT Glucose 184 (H) 74 - 99 mg/dL          Edwar Hemphill is a  39 y.o.  Year old , with PMHx of ESRD on HD (MWF, right femoral line), HFrEF (EF 35-40% 5/2024), T2DM, PVD (s/p left AKA and right forearm amputation), multiple line-related DVT (RIJ, LIJ, RUE, LUE, RLE, LLE; on Eliquis prior to recent upper GI bleed 6/15) presenting from [facility] due to hypotension prior to dialysis. Admitted to MICU for septic shock 2/2 mrsa bacteremia. Nephrology consulted for dialysis needs.     Updates:  - He is on/off levophed  - Last blood cultures are clear, no plan to exchange catheter  - last SLED done 8/2    #ESRD on HD MWF via R femoral TDC  #Hx of multiple HD catheter infections, mrsa bacteremia   -HD at [facility], nephrologist Dr. Licona - last HD 7/19  Started on CVVH 7/23 due to hemodynamic instability. Stopped  - Switched to SLED, last session 8/2  -hemodynamics: unstable  - For SLED today.     #Septic shock 2/2 staph aureus bacteremia  #TV infective endocarditis   -vancomycin, ceftaroline   -Bcx 7/25 staph, 7/27 - staph heamolyticus. Bcx 7/29 NGTD  - Midodrine 20 mg Q8 hours.  - Last cultures clear.       #Anemia   - epo           RECOMMENDATIONS:  - Proceed with SLED with UF 1.5 L  - Supportive care as per ICU.  - Strict I/Os please.      Peng Daniel MD  Nephrology Fellow   Daytime / Weekend Renal Pager [phone]  After 7 pm Emergencies [phone] Pager [phone]

## 2024-08-05 NOTE — PROGRESS NOTES
"Medical Intensive Care - Daily Progress Note   Subjective    Edwar Hemphill is a 39 y.o. year old male patient admitted on 7/22/2024 with following ICU needs: Hypotension requiring vasopressors     Interval History:  Off norepinephrine since 8/4/24 @ 1900. C/w midodrine to maintain MAPs >65.  Pt c/o left wrist pain persisting o/n; consulted ortho on 8/5.    Meds    Scheduled medications  atorvastatin, 40 mg, oral, Nightly  B complex-vitamin C-folic acid, 1 capsule, oral, Daily  ceftaroline, 200 mg, intravenous, q8h  cholecalciferol, 50,000 Units, oral, Once per day on Monday Thursday  epoetin tyson or biosimilar, 10,000 Units, subcutaneous, Once per day on Monday Wednesday Friday  folic acid, 1 mg, oral, Daily  insulin glargine, 8 Units, subcutaneous, Nightly  insulin lispro, 0-10 Units, subcutaneous, TID  levothyroxine, 150 mcg, oral, Daily before breakfast  melatonin, 6 mg, oral, Nightly  midodrine, 20 mg, oral, q8h  nystatin, 1 Application, Topical, BID  pantoprazole, 40 mg, oral, Daily before breakfast  thiamine, 200 mg, oral, Daily      Continuous medications  heparin, 0-4,500 Units/hr, Last Rate: 1,700 Units/hr (08/04/24 1839)  norepinephrine, 0.01-1 mcg/kg/min (Dosing Weight), Last Rate: Stopped (08/04/24 1900)      PRN medications  PRN medications: acetaminophen **OR** [DISCONTINUED] acetaminophen **OR** [DISCONTINUED] acetaminophen, dextrose, dextrose, diphenhydrAMINE, glucagon, glucagon, heparin, heparin, lidocaine, oxyCODONE, vancomycin     Objective    Blood pressure 101/66, pulse 78, temperature 36 °C (96.8 °F), temperature source Temporal, resp. rate 12, height 1.753 m (5' 9.02\"), weight 75.4 kg (166 lb 3.6 oz), SpO2 93%.     Physical Exam     Constitutional: Ill appearing male in NAD  Eyes: anicteric  ENMT: MMM  Head/Neck: AT/NC  Respiratory/Thorax: Lungs CTA bilaterally, non-labored breathing, no cough, on RA  Cardiovascular: Regular, rate and rhythm, no murmurs, normal S1 and S2  Gastrointestinal: " Nondistended, soft, non-tender, BS present x 4  Musculoskeletal: RUE and Left AKA.  LUE swelling to wrist w/acre wrap in place  Neurological: alert and oriented x 3, speech clear, follows commands No focal deficits  Skin: Warm and dry    Intake/Output Summary (Last 24 hours) at 8/5/2024 0732  Last data filed at 8/5/2024 0600  Gross per 24 hour   Intake 580.61 ml   Output --   Net 580.61 ml     Labs:   Results from last 72 hours   Lab Units 08/05/24  0402 08/04/24 0448 08/03/24  0604   SODIUM mmol/L 130* 132* 131*   POTASSIUM mmol/L 4.4 3.8 3.5   CHLORIDE mmol/L 96* 97* 96*   CO2 mmol/L 25 25 26   BUN mg/dL 25* 18 12   CREATININE mg/dL 4.21* 3.33* 2.32*   GLUCOSE mg/dL 392* 142* 177*   CALCIUM mg/dL 8.3* 8.5* 8.1*   ANION GAP mmol/L 13 14 13   EGFR mL/min/1.73m*2 17* 23* 36*   PHOSPHORUS mg/dL 4.8 4.4 3.5      Results from last 72 hours   Lab Units 08/05/24  0402 08/04/24 0448 08/03/24  2358 08/03/24  0604   WBC AUTO x10*3/uL 8.9 11.1 11.1 8.4   HEMOGLOBIN g/dL 7.3* 7.8* 7.9* 7.9*   HEMATOCRIT % 21.5* 24.9* 25.3* 24.8*   PLATELETS AUTO x10*3/uL 42* 55* 59* 45*   NEUTROS PCT AUTO % 75.0 72.2  --   --    LYMPHO PCT MAN %  --   --   --  8.7   LYMPHS PCT AUTO % 14.5 17.3  --   --    MONO PCT MAN %  --   --   --  2.6   MONOS PCT AUTO % 7.7 8.5  --   --    EOSINO PCT MAN %  --   --   --  0.0   EOS PCT AUTO % 0.8 0.5  --   --         Micro/ID:     Lab Results   Component Value Date    BLOODCULT No growth at 4 days -  FINAL REPORT 07/31/2024    BLOODCULT No growth at 4 days -  FINAL REPORT 07/31/2024       Summary of key imaging results from the last 24 hours  NA    Assessment and Plan     Assessment: Edwar Hemphill is a 39 y.o. year old male patient admitted on 7/22/2024 with septic shock 2/2 infective endocarditis.    Mechanical Ventilation: none  Sedation/Analgesia:  none  Restraints: no    Summary for 08/05/24  :  Off Levo   Consulted Ortho 2/2 left wrist pain  Follow up with nephro; plan for SLED on 8/5/24 (1L removal  plan)    Plan:  NEUROLOGY/PSYCH:  Dx:  PMHx of leaving AMA  Left wrist pain  Management:  S/p Dilaudid 0.2 mg IVP this AM for left wrist pain  Changed Oxy 5 mg q6 to q4  C/w acetaminophen PRN   Melatonin HS   Thiamine daily     CARDIOVASCULAR:  Dx:  Infective endocarditis with TV vegetation, Severely attenuated superior vena cava, bilateral brachiocephalic, subclavian and internal jugular veins with the calcifications in the brachiocephalic veins with extensive collaterals in the chest wall likely representing chronic thrombosis. Patient had vegectomy aborted due to to concerns for infected thrombus in the IVC. Persistent hypotension.   Management:  Weaned off Norepi on 8/4  C/w midodrine 20mg TID (wean as tolerated)     PULMONARY:  Dx:  NAVI  Management:  NA    RENAL/GENITOURINARY:  Dx:  ESRD on iHD MWF via Rt fem HD line (IR placed)  Management:  Plan for SLED on 8/5 w/1 liter removal   Hold off on IR HD line placement pending cultures  Epoetin 10,000 MWF  Renal MV   Nystatin     GASTROENTEROLOGY:  Dx:  Esophagitis  GERD  Management:  PPI   C/w regular diet       ENDOCRINOLOGY:  Dx:  DM1  Hypothyroidism   Management:  Starting insulin gtt with BS goal of <180  Levothyroxine increased to 150 mcg daily    HEMATOLOGY:  Dx:  Anemia of chronic disease   Thrombocytopenia   Management:  C/w heparin gtt  CBC daily    MUSCULOSKELETAL/ SKIN:  Dx:  Septic arthritis of Left wrist  Culture growing MRSA  Management:  Vanco/ceftaroline    INFECTIOUS DISEASE:  Dx:  MRSA, Staph haemolyticus bacteremia.  Hx of Acinetobacter  Culture from 7/29 negative, Culture from 7/31 NGTD  Management:  ID following  Vanco/Ceftaroline    ICU Check List       ICU Liberation: Intervention:   Assess, Prevent, Manage Pain CPOT - NA   Both SAT and SBT [] SAT  [] SBT 30-60 min [] Extubate to NC  [] Extubate to NIV  [] Discuss Trach   Choice of analgesia and sedation RASS: 0  none NA   Delirium: Assess, prevent and manage CAM - NA   Early Mobility and  Exercise  [x] PT /OT consult   Family Engagement and Empowerment [x]Family updated [x]SW consult     FEN  Fluids: PRN  Electrolytes: HD  Nutrition: Regular diet  Prophylaxis:  DVT ppx: Heparin gtt  GI ppx: PPI  Bowel care: NA  Hardware:  Catheter: NA  Access: Rt fem HD line  Drains: NA  Social:  Code: Full Code    HPOA:  Shanthi Hemphill - Mobile Phone: [phone] --> POC discussed at the bedside on 8/5/24  Disposition: MICU with receiving SLED; will transition to SDU when tolerating iHD.      Kobi Jimenez, AMARILYS-CNP   08/05/24 at 7:32 AM     Disclaimer: Documentation completed with the information available at the time of input. The times in the chart may not be reflective of actual patient care times, interventions, or procedures. Documentation occurs after the physical care of the patient.

## 2024-08-05 NOTE — CARE PLAN
Problem: Pain - Adult  Goal: Verbalizes/displays adequate comfort level or baseline comfort level  Outcome: Progressing  Flowsheets (Taken 8/5/2024 1717)  Verbalizes/displays adequate comfort level or baseline comfort level:   Encourage patient to monitor pain and request assistance   Assess pain using appropriate pain scale   Notify Licensed Independent Practitioner if interventions unsuccessful or patient reports new pain   Administer analgesics based on type and severity of pain and evaluate response     Problem: Safety - Adult  Goal: Free from fall injury  Outcome: Progressing  Flowsheets (Taken 8/5/2024 1717)  Free from fall injury:   Instruct family/caregiver on patient safety   Based on caregiver fall risk screen, instruct family/caregiver to ask for assistance with transferring infant if caregiver noted to have fall risk factors     Problem: Skin  Goal: Decreased wound size/increased tissue granulation at next dressing change  Outcome: Progressing  Goal: Participates in plan/prevention/treatment measures  Outcome: Progressing  Goal: Prevent/manage excess moisture  Outcome: Progressing  Goal: Prevent/minimize sheer/friction injuries  Outcome: Progressing  Goal: Promote/optimize nutrition  Outcome: Progressing  Goal: Promote skin healing  Outcome: Progressing     Problem: Fall/Injury  Goal: Be free from injury by end of the shift  Outcome: Progressing

## 2024-08-05 NOTE — PROGRESS NOTES
Physical Therapy    Physical Therapy Treatment    Patient Name: Edwar Hemphill  MRN: [id number]  Today's Date: 8/5/2024  Time Calculation  Start Time: 1458  Stop Time: 1522  Time Calculation (min): 24 min    Assessment/Plan   PT Assessment  Rehab Prognosis: Fair  End of Session Communication: Bedside nurse  Assessment Comment: patient only agreeable to participate in ther ex in bed; declined to attempt to sit EOB; patient perseverating on L wrist pain as limiting factor to not mobilizing. Remains appropriate for MOD intensity skilled PT services upon D/C.  End of Session Patient Position: Bed, 3 rail up, Alarm off, not on at start of session     PT Plan  Treatment/Interventions: Bed mobility, Transfer training, Balance training, Strengthening, Endurance training, Therapeutic exercise, Therapeutic activity, Positioning, Postural re-education  PT Plan: Ongoing PT  PT Frequency: 2 times per week  PT Discharge Recommendations: Moderate intensity level of continued care  PT - OK to Discharge: Yes    General Visit Information:   PT  Visit  PT Received On: 08/05/24  General  Family/Caregiver Present: Yes  Caregiver Feedback: patient's mother in the room  Prior to Session Communication: Bedside nurse  Patient Position Received: Bed, 3 rail up, Alarm off, not on at start of session  General Comment: agreeable to participate however, reporting increase L wrist pain and declining to attempt to mobilize; PT encouraged patient to complete ther-ex at least  in bed. (insulin, heparin gtt; VSS during session)    Subjective   Precautions:  Precautions  Hearing/Visual Limitations: kept eyes closed  Medical Precautions: Fall precautions  Precautions Comment: Contact precaution       Objective   Pain:  Pain Assessment  Pain Assessment: 0-10  0-10 (Numeric) Pain Score:  (did not rate numerically)  Pain Location:  (wrist)  Cognition:  Cognition  Overall Cognitive Status: Impaired  Cognition Comments: appears self-limiting at times; flat  affect, withdrawn     Activity Tolerance:  Activity Tolerance  Early Mobility/Exercise Safety Screen: Proceed with mobilization - No exclusion criteria met  Treatments:  Therapeutic Exercise  Therapeutic Exercise Performed: Yes  Therapeutic Exercise Activity 1: RLE: PF/DF against mild resistance x10; LAQ 2x10 B, hip ABD x10; passive hip IR stretch held 30 seconds    Outcome Measures:  [facility] Basic Mobility  Turning from your back to your side while in a flat bed without using bedrails: Total  Moving from lying on your back to sitting on the side of a flat bed without using bedrails: Total  Moving to and from bed to chair (including a wheelchair): Total  Standing up from a chair using your arms (e.g. wheelchair or bedside chair): Total  To walk in hospital room: Total  Climbing 3-5 steps with railing: Total  Basic Mobility - Total Score: 6    FSS-ICU  Ambulation: Unable to attempt due to weakness  Rolling: Unable to perform  Sitting: Unable to perform  Transfer Sit-to-Stand: Unable to perform  Transfer Supine-to-Sit: Unable to perform  Total Score: 0      Early Mobility/Exercise Safety Screen: Proceed with mobilization - No exclusion criteria met  ICU Mobility Scale: Sitting in bed, exercises in bed [1]    Education Documentation  Mobility Training, taught by Landy Cyr PT at 8/5/2024  4:05 PM.  Learner: Patient  Readiness: Nonacceptance  Method: Explanation  Response: Needs Reinforcement  Comment: encouraged mobility    Education Comments  No comments found.           Encounter Problems       Encounter Problems (Active)       Balance       Patient will complete static (ModAx1) and dynamic (MaxAx1) sitting balance activities using UE support as needed in order to maintain midline without acute LOB.  (Not Progressing)       Start:  07/23/24    Expected End:  08/13/24               Mobility       Patient will participate in B LE there-ex program in order to assist in improving strength and to assist with the  completion of functional mobility tasks.  (Progressing)       Start:  07/23/24    Expected End:  08/13/24            Patient will complete rolling R and L with ModAx1 with HOB elevated and use of a bedrail (Not Progressing)       Start:  07/23/24    Expected End:  08/13/24            Patient will complete Supine <> Sit with MAXx1 with HOB elevated and use of a bedrail  (Not Progressing)       Start:  07/23/24    Expected End:  08/13/24                          Landy Cyr, PT, DPT

## 2024-08-05 NOTE — CARE PLAN
Problem: Skin  Goal: Decreased wound size/increased tissue granulation at next dressing change  Outcome: Progressing  Flowsheets (Taken 8/4/2024 2314)  Decreased wound size/increased tissue granulation at next dressing change:   Promote sleep for wound healing   Protective dressings over bony prominences   Utilize specialty bed per algorithm  Goal: Participates in plan/prevention/treatment measures  Outcome: Progressing  Flowsheets (Taken 8/4/2024 2314)  Participates in plan/prevention/treatment measures:   Discuss with provider PT/OT consult   Elevate heels  Goal: Prevent/manage excess moisture  Outcome: Progressing  Flowsheets (Taken 8/4/2024 2314)  Prevent/manage excess moisture:   Cleanse incontinence/protect with barrier cream   Follow provider orders for dressing changes   Monitor for/manage infection if present   Moisturize dry skin  Goal: Prevent/minimize sheer/friction injuries  Outcome: Progressing  Flowsheets (Taken 8/4/2024 2314)  Prevent/minimize sheer/friction injuries:   Complete micro-shifts as needed if patient unable. Adjust patient position to relieve pressure points, not a full turn   Use pull sheet   HOB 30 degrees or less   Turn/reposition every 2 hours/use positioning/transfer devices   Utilize specialty bed per algorithm  Goal: Promote/optimize nutrition  Outcome: Progressing  Flowsheets (Taken 8/4/2024 2314)  Promote/optimize nutrition:   Monitor/record intake including meals   Consume > 50% meals/supplements   Offer water/supplements/favorite foods  Goal: Promote skin healing  Outcome: Progressing  Flowsheets (Taken 8/4/2024 2314)  Promote skin healing:   Assess skin/pad under line(s)/device(s)   Protective dressings over bony prominences   Turn/reposition every 2 hours/use positioning/transfer devices   Ensure correct size (line/device) and apply per  instructions   Rotate device position/do not position patient on device

## 2024-08-05 NOTE — PROGRESS NOTES
Edwar Hemphill is a 39 y.o. male on day 14 of admission presenting with Septic shock (Multi).    Subjective   Interval History:   -Left wrist pain worsening overnight  -Denies fevers, chills, sweats, rash, diarrhea  -Hearing overall better  -Reviewed course and difficult situation with respect to MRSA infection given clot and retained line    Review of Systems    Objective   Range of Vitals (last 24 hours)  Heart Rate:  [73-84]   Temp:  [35.9 °C (96.6 °F)-36.6 °C (97.9 °F)]   Resp:  [11-21]   BP: ()/(40-81)   SpO2:  [91 %-100 %]   Daily Weight  07/25/24 : 75.4 kg (166 lb 3.6 oz)    Body mass index is 24.54 kg/m².    Physical Exam  CONSTITUTIONAL: Chronically ill appearing, NAD, cooperative  EYES: PERRLA, EOMI, Sclera anicteric.  No conjunctival injection.  No petechial or embolic lesions  ENMT: MMM, No oral lesions or thrush.   CVS: RRR, S1 & S2 normal.  No murmurs, rubs or gallops appreciated.  Pedal pulses intact.  RESPIRATORY/CHEST: Clear anteriorly.  No rales or rhonchi  GI: Soft, NT/ND.  No rebound or guarding  EXT: Left wrist incision site well-approximated without erythema or expressible drainage; slightly area of fluctuance proximally that appears more related to wrapping. Right LE with edema and scaling skin. R fem line without surrounding erythema/induration/drainage. S/p RUE above elbow amputation, LLE AKA; sites appear c/d/i    Antibiotics  ceftaroline (Teflaro) IV in 50 mL D5W  vancomycin    Relevant Results  Labs  Results from last 72 hours   Lab Units 08/05/24  0402 08/04/24  0448 08/03/24  2358 08/03/24  0604   WBC AUTO x10*3/uL 8.9 11.1 11.1 8.4   HEMOGLOBIN g/dL 7.3* 7.8* 7.9* 7.9*   HEMATOCRIT % 21.5* 24.9* 25.3* 24.8*   PLATELETS AUTO x10*3/uL 42* 55* 59* 45*   NEUTROS PCT AUTO % 75.0 72.2  --   --    LYMPHO PCT MAN %  --   --   --  8.7   LYMPHS PCT AUTO % 14.5 17.3  --   --    MONO PCT MAN %  --   --   --  2.6   MONOS PCT AUTO % 7.7 8.5  --   --    EOSINO PCT MAN %  --   --   --  0.0   EOS  PCT AUTO % 0.8 0.5  --   --      Results from last 72 hours   Lab Units 08/05/24  0402 08/04/24  0448 08/03/24  0604   SODIUM mmol/L 130* 132* 131*   POTASSIUM mmol/L 4.4 3.8 3.5   CHLORIDE mmol/L 96* 97* 96*   CO2 mmol/L 25 25 26   BUN mg/dL 25* 18 12   CREATININE mg/dL 4.21* 3.33* 2.32*   GLUCOSE mg/dL 392* 142* 177*   CALCIUM mg/dL 8.3* 8.5* 8.1*   ANION GAP mmol/L 13 14 13   EGFR mL/min/1.73m*2 17* 23* 36*   PHOSPHORUS mg/dL 4.8 4.4 3.5     Results from last 72 hours   Lab Units 08/05/24  0402 08/04/24 0448 08/03/24  0604   ALK PHOS U/L 211* 212* 194*   BILIRUBIN TOTAL mg/dL 0.6 0.7 0.8   PROTEIN TOTAL g/dL 5.4* 5.5* 5.1*   ALT U/L 13 13 14   AST U/L 17 13 12   ALBUMIN g/dL 2.4* 2.5* 2.3*     Estimated Creatinine Clearance: 23.3 mL/min (A) (by C-G formula based on SCr of 4.21 mg/dL (H)).  C-Reactive Protein   Date Value Ref Range Status   07/27/2024 6.44 (H) <1.00 mg/dL Final     CRP   Date Value Ref Range Status   07/24/2023 8.04 (A) mg/dL Final     Comment:     REF VALUE  < 1.00     07/24/2023 CANCELED       Comment:     Result canceled by the ancillary.     Microbiology  Susceptibility data from last 14 days.  Collected Specimen Info Organism Clindamycin Erythromycin Oxacillin Rifampin Tetracycline Trimethoprim/Sulfamethoxazole Vancomycin   07/27/24 Fluid from Synovial Methicillin Resistant Staphylococcus aureus (MRSA)  R  R  R   R  S  S   07/27/24 Blood culture from Peripheral Venipuncture Staphylococcus haemolyticus  R  R  R   S  R  S   07/25/24 Blood culture from Peripheral Venipuncture Staphylococcus aureus          07/25/24 Blood culture from Peripheral Venipuncture Staphylococcus aureus          07/22/24 Blood culture from Peripheral Venipuncture Methicillin Resistant Staphylococcus aureus (MRSA)  R  R  R  I  R  S  S   07/22/24 Blood culture from Peripheral Venipuncture Staphylococcus aureus          -07/22 Bcx: MRSA in 2/2, S bactrim and vanc  -07/25 Bcx: MRSA in 2/2  -07/27 Bcx: Staph haemolyticus  (1/1)  -07/27 OR culture (L wrist): MRSA  -07/29 Bcx: NG  -07/31 Bcx: NG    Imaging  Reviewed in Epic.   TTE 7/23 with thickened TV and mobile densities associated with the TV with severe TR, not seen previously (TTE 5/14/24 with trivial TR and normal TV), suspicious for endocarditis.  Currently receiving Vancomycin and Ceftaroline for the Methicillin-resistant Staphylococcus aureus.  POLI 7/24 with EF 40 and global hypokinesis, large vegetation encasing TDC with involvement and destruction of septal leaflet of TV and severe TR. Small mobile echodensity on aortic valve, concerning for vegetation vs degenerative change. Moderate patent foramen ovale.     Assessment/Plan   39 y.o. male with ESRD on HD (MWF, right femoral line), HFrEF (EF 35-40% 5/2024), T2DM, PAD s/p L AKA and R above elbow amputation with multiple hospitalizations for L AKA infection and MRSA bacteremia who presented from SNF on 7/22 with hypotension.     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 Cultures cleared 7/29. Vegectomy not pursued ultimately due to comorbidities. CT surgery previously evaluated as well, not planned for surgical intervention for vegetation nor clot.    Recommendations:  -Will follow-up any concerns regarding L wrist with you and Orthopedics colleagues  -Appreciate discussions about dialysis catheter removal and/or replacement if possible; discussions seem to be favoring retention, though I worry about the residual thrombus there  -Continue vancomycin  -Continue ceftaroline for now. If no concerns for ongoing wrist infection, we can plan to stop this tomorrow 8/6  -Will consider indefinite suppression with bactrim if he can tolerate it. We did discuss the chance of breakthrough infection today  -Will request follow-up with me in a few weeks time to discuss PO suppression  afterwards  -Will follow    Please contact Team B via [employer] (me) or via pager [phone] with questions.    Doroteo Graham MD

## 2024-08-06 ENCOUNTER — APPOINTMENT (OUTPATIENT)
Dept: RADIOLOGY | Facility: HOSPITAL | Age: 39
End: 2024-08-06
Payer: COMMERCIAL

## 2024-08-06 LAB
ALBUMIN SERPL BCP-MCNC: 2.5 G/DL (ref 3.4–5)
ALP SERPL-CCNC: 210 U/L (ref 33–120)
ALT SERPL W P-5'-P-CCNC: 15 U/L (ref 10–52)
ANION GAP SERPL CALC-SCNC: 15 MMOL/L (ref 10–20)
AST SERPL W P-5'-P-CCNC: 16 U/L (ref 9–39)
BASOPHILS # BLD AUTO: 0.13 X10*3/UL (ref 0–0.1)
BASOPHILS NFR BLD AUTO: 1.5 %
BILIRUB SERPL-MCNC: 0.6 MG/DL (ref 0–1.2)
BUN SERPL-MCNC: 31 MG/DL (ref 6–23)
CALCIUM SERPL-MCNC: 9 MG/DL (ref 8.6–10.6)
CHLORIDE SERPL-SCNC: 96 MMOL/L (ref 98–107)
CO2 SERPL-SCNC: 25 MMOL/L (ref 21–32)
CREAT SERPL-MCNC: 4.93 MG/DL (ref 0.5–1.3)
DACRYOCYTES BLD QL SMEAR: NORMAL
EGFRCR SERPLBLD CKD-EPI 2021: 14 ML/MIN/1.73M*2
EOSINOPHIL # BLD AUTO: 0.1 X10*3/UL (ref 0–0.7)
EOSINOPHIL NFR BLD AUTO: 1.2 %
ERYTHROCYTE [DISTWIDTH] IN BLOOD BY AUTOMATED COUNT: 21.3 % (ref 11.5–14.5)
FOLATE SERPL-MCNC: >24 NG/ML
GLUCOSE BLD MANUAL STRIP-MCNC: 122 MG/DL (ref 74–99)
GLUCOSE BLD MANUAL STRIP-MCNC: 141 MG/DL (ref 74–99)
GLUCOSE BLD MANUAL STRIP-MCNC: 143 MG/DL (ref 74–99)
GLUCOSE BLD MANUAL STRIP-MCNC: 170 MG/DL (ref 74–99)
GLUCOSE BLD MANUAL STRIP-MCNC: 92 MG/DL (ref 74–99)
GLUCOSE SERPL-MCNC: 151 MG/DL (ref 74–99)
HCT VFR BLD AUTO: 23.9 % (ref 41–52)
HGB BLD-MCNC: 7.9 G/DL (ref 13.5–17.5)
HYPOCHROMIA BLD QL SMEAR: NORMAL
IMM GRANULOCYTES # BLD AUTO: 0.04 X10*3/UL (ref 0–0.7)
IMM GRANULOCYTES NFR BLD AUTO: 0.5 % (ref 0–0.9)
LYMPHOCYTES # BLD AUTO: 1.36 X10*3/UL (ref 1.2–4.8)
LYMPHOCYTES NFR BLD AUTO: 16.1 %
MAGNESIUM SERPL-MCNC: 1.96 MG/DL (ref 1.6–2.4)
MCH RBC QN AUTO: 30 PG (ref 26–34)
MCHC RBC AUTO-ENTMCNC: 33.1 G/DL (ref 32–36)
MCV RBC AUTO: 91 FL (ref 80–100)
MONOCYTES # BLD AUTO: 0.67 X10*3/UL (ref 0.1–1)
MONOCYTES NFR BLD AUTO: 7.9 %
NEUTROPHILS # BLD AUTO: 6.16 X10*3/UL (ref 1.2–7.7)
NEUTROPHILS NFR BLD AUTO: 72.8 %
NRBC BLD-RTO: 0 /100 WBCS (ref 0–0)
OVALOCYTES BLD QL SMEAR: NORMAL
PHOSPHATE SERPL-MCNC: 5.3 MG/DL (ref 2.5–4.9)
PLATELET # BLD AUTO: 48 X10*3/UL (ref 150–450)
POLYCHROMASIA BLD QL SMEAR: NORMAL
POTASSIUM SERPL-SCNC: 4.6 MMOL/L (ref 3.5–5.3)
PROT SERPL-MCNC: 5.7 G/DL (ref 6.4–8.2)
RBC # BLD AUTO: 2.63 X10*6/UL (ref 4.5–5.9)
RBC MORPH BLD: NORMAL
SODIUM SERPL-SCNC: 131 MMOL/L (ref 136–145)
TARGETS BLD QL SMEAR: NORMAL
UFH PPP CHRO-ACNC: 0.4 IU/ML
VIT B12 SERPL-MCNC: 1027 PG/ML (ref 211–911)
WBC # BLD AUTO: 8.5 X10*3/UL (ref 4.4–11.3)

## 2024-08-06 PROCEDURE — 2500000004 HC RX 250 GENERAL PHARMACY W/ HCPCS (ALT 636 FOR OP/ED)

## 2024-08-06 PROCEDURE — 99222 1ST HOSP IP/OBS MODERATE 55: CPT | Performed by: INTERNAL MEDICINE

## 2024-08-06 PROCEDURE — 99291 CRITICAL CARE FIRST HOUR: CPT

## 2024-08-06 PROCEDURE — 80053 COMPREHEN METABOLIC PANEL: CPT

## 2024-08-06 PROCEDURE — 36415 COLL VENOUS BLD VENIPUNCTURE: CPT

## 2024-08-06 PROCEDURE — 70450 CT HEAD/BRAIN W/O DYE: CPT

## 2024-08-06 PROCEDURE — 99232 SBSQ HOSP IP/OBS MODERATE 35: CPT | Performed by: INTERNAL MEDICINE

## 2024-08-06 PROCEDURE — 2500000001 HC RX 250 WO HCPCS SELF ADMINISTERED DRUGS (ALT 637 FOR MEDICARE OP)

## 2024-08-06 PROCEDURE — 83735 ASSAY OF MAGNESIUM: CPT

## 2024-08-06 PROCEDURE — 2500000004 HC RX 250 GENERAL PHARMACY W/ HCPCS (ALT 636 FOR OP/ED): Mod: JZ

## 2024-08-06 PROCEDURE — 70450 CT HEAD/BRAIN W/O DYE: CPT | Performed by: RADIOLOGY

## 2024-08-06 PROCEDURE — 82746 ASSAY OF FOLIC ACID SERUM: CPT

## 2024-08-06 PROCEDURE — 2060000001 HC INTERMEDIATE ICU ROOM DAILY

## 2024-08-06 PROCEDURE — 85520 HEPARIN ASSAY: CPT

## 2024-08-06 PROCEDURE — 86022 PLATELET ANTIBODIES: CPT

## 2024-08-06 PROCEDURE — 82607 VITAMIN B-12: CPT

## 2024-08-06 PROCEDURE — 1200000002 HC GENERAL ROOM WITH TELEMETRY DAILY

## 2024-08-06 PROCEDURE — 85025 COMPLETE CBC W/AUTO DIFF WBC: CPT

## 2024-08-06 PROCEDURE — 84100 ASSAY OF PHOSPHORUS: CPT

## 2024-08-06 PROCEDURE — 99232 SBSQ HOSP IP/OBS MODERATE 35: CPT | Performed by: STUDENT IN AN ORGANIZED HEALTH CARE EDUCATION/TRAINING PROGRAM

## 2024-08-06 PROCEDURE — 82947 ASSAY GLUCOSE BLOOD QUANT: CPT

## 2024-08-06 RX ORDER — HYDROMORPHONE HYDROCHLORIDE 1 MG/ML
0.1 INJECTION, SOLUTION INTRAMUSCULAR; INTRAVENOUS; SUBCUTANEOUS
Status: DISCONTINUED | OUTPATIENT
Start: 2024-08-06 | End: 2024-08-20

## 2024-08-06 RX ORDER — ONDANSETRON HYDROCHLORIDE 2 MG/ML
4 INJECTION, SOLUTION INTRAVENOUS ONCE
Status: COMPLETED | OUTPATIENT
Start: 2024-08-06 | End: 2024-08-06

## 2024-08-06 RX ORDER — HYDROMORPHONE HYDROCHLORIDE 1 MG/ML
INJECTION, SOLUTION INTRAMUSCULAR; INTRAVENOUS; SUBCUTANEOUS
Status: COMPLETED
Start: 2024-08-06 | End: 2024-08-06

## 2024-08-06 RX ORDER — HYDROMORPHONE HYDROCHLORIDE 1 MG/ML
0.4 INJECTION, SOLUTION INTRAMUSCULAR; INTRAVENOUS; SUBCUTANEOUS ONCE
Status: DISCONTINUED | OUTPATIENT
Start: 2024-08-06 | End: 2024-08-07

## 2024-08-06 RX ORDER — HYDROMORPHONE HYDROCHLORIDE 1 MG/ML
0.4 INJECTION, SOLUTION INTRAMUSCULAR; INTRAVENOUS; SUBCUTANEOUS ONCE
Status: COMPLETED | OUTPATIENT
Start: 2024-08-06 | End: 2024-08-06

## 2024-08-06 ASSESSMENT — COGNITIVE AND FUNCTIONAL STATUS - GENERAL
DRESSING REGULAR UPPER BODY CLOTHING: TOTAL
TURNING FROM BACK TO SIDE WHILE IN FLAT BAD: TOTAL
WALKING IN HOSPITAL ROOM: TOTAL
CLIMB 3 TO 5 STEPS WITH RAILING: TOTAL
TOILETING: TOTAL
EATING MEALS: TOTAL
HELP NEEDED FOR BATHING: TOTAL
MOBILITY SCORE: 6
DAILY ACTIVITIY SCORE: 6
DRESSING REGULAR LOWER BODY CLOTHING: TOTAL
MOVING TO AND FROM BED TO CHAIR: TOTAL
STANDING UP FROM CHAIR USING ARMS: TOTAL
PERSONAL GROOMING: TOTAL
MOVING FROM LYING ON BACK TO SITTING ON SIDE OF FLAT BED WITH BEDRAILS: TOTAL

## 2024-08-06 ASSESSMENT — PAIN - FUNCTIONAL ASSESSMENT
PAIN_FUNCTIONAL_ASSESSMENT: 0-10

## 2024-08-06 ASSESSMENT — PAIN SCALES - GENERAL
PAINLEVEL_OUTOF10: 0 - NO PAIN
PAINLEVEL_OUTOF10: 0 - NO PAIN
PAINLEVEL_OUTOF10: 7
PAINLEVEL_OUTOF10: 8
PAINLEVEL_OUTOF10: 7
PAINLEVEL_OUTOF10: 0 - NO PAIN
PAINLEVEL_OUTOF10: 0 - NO PAIN
PAINLEVEL_OUTOF10: 7
PAINLEVEL_OUTOF10: 0 - NO PAIN
PAINLEVEL_OUTOF10: 8
PAINLEVEL_OUTOF10: 8

## 2024-08-06 ASSESSMENT — PAIN DESCRIPTION - DESCRIPTORS: DESCRIPTORS: ACHING

## 2024-08-06 ASSESSMENT — PAIN DESCRIPTION - LOCATION: LOCATION: WRIST

## 2024-08-06 ASSESSMENT — PAIN DESCRIPTION - ORIENTATION: ORIENTATION: LEFT

## 2024-08-06 ASSESSMENT — PAIN SCALES - PAIN ASSESSMENT IN ADVANCED DEMENTIA (PAINAD): TOTALSCORE: MEDICATION (SEE MAR)

## 2024-08-06 NOTE — SIGNIFICANT EVENT
SDU to Floor Transfer Summary & Floor Readiness Note     I, personally, evaluated Edwar Hemphill prior to transfer to the floor, including reviewing all current laboratory and imaging studies. The patient remains appropriate for transfer to the floor. Bedside nurse and respiratory therapy are also in agreement of patient's readiness for the floor.        I:  Hospital Course   Edwar Hemphill is a 39 y.o. male with PMHx significant for ESRD (MWF  via right femoral line), NICM,  HFrEF (LVEF 35-40% on 5/2024 POLI), anemia, DM2, Left AKA, RUE amputation, HTN, PVD, GERD.  Patient presented to his dialysis session last night. and was found to be hypotensive 70s/40s, and was told to brought to the ED.  Upon presentation in the ED patient was found to have blood pressure of 112/76, afebrile, tachycardic with heart rate of 109, respirate of 16 satting well on room air.  Patient's labs revealed hyponatremia, hypokalemia at 3.1, creatinine of 6.88 (patient is ESRD) lactate on presentation was 3.3.  Patient CBC showed leukocytosis with white count of 20 K, hemoglobin of 7.3 (baseline), platelets of 65 (baseline 180s).  Patient was also evaluated by POCUS and was found to have collapsible IVC, BC 2/4 gram positive cocci in clusters.  Patient was started on Vanc/ Zosyn due to concern for sepsis (patient has a history of MRSA sepsis) patient was also given a total of 2.5 L of LR in the ED as well as started on low-dose pressors.     MICU course:  Admitted  with septic shock required pressors and infective endocarditis with large TV vegetation on TTE, w/ bcx growing GPCs with staph aureus (with history MRSA bacteremia before) and Acinetobacter baumanni positive on his wound culture. Started on vanc/zosyn, fluid resuscitated, ID, cardiology and vascular medicine consulted. On TTE showed TV vegetation and possible source could be femoral catheter with attached vegetations,cardiology consulted and advised for POLI. Patient continued on CVVH  based on nephrology consult (plan was to continue dialysis before we take the line out). He had a drop in his plts from 65 at admission to 37, we transfused 2 units plts before POLI procedure and plts came back to 57.  Changed zosyn to ceftaroline on 7/23 and continued with vancomycin. POLI was done on 7/24 and showed, a large vegetation that circumferentially encases the femoral TDC ( catheter) and presented PFO. We were not able to take off the line as a source of infection given his DVTs in subclavian, LIJ, b/l innominate occlusion and complete occlusion of intrahepatic IVC its hard to find another access at this time. IR, cardiology, ID, Vascular medicine were on board in terms of decision for taking this line off. Because of risk of shedding from the attached veg planned to take this line off with surgery.   Patient was complaining of wrist pain and ID recommended with MRI and synovial tap given his history of osteomyelitis with MRSA. MRI was unremarkable, synovial tap was done and showed less than wbc and culture is still pending.   GI consulted given his history of GIB in June and drop in his Hb in terms of starting anticoagulation. Per GI risk of bleeding is less than thrombosis and they referred the decision of anticoagulation therapy to primary team. Given his HB drop and low plts we held on anticoagulation for now.   Cardiac surgery consulted and they recommended CTA and consult with endovascular team for possible vagectomy, given his multiple thrombi in SVC, IVC and difficulties on getting access for bypass.    CTA showed Severely attenuated superior vena cava, bilateral brachiocephalic, subclavian and internal jugular veins with the calcifications in the brachiocephalic veins with extensive collaterals in the chest wall likely representing chronic thrombosis. Endovascular consulted and they tentatively planning for vegectomy with Dr. Dennis Monday 7/29.     Synovial fluid growing MRSA.  Unable to tolerate  HD and required SLED and low dose levo.    Cultures from 7/29 (blood) negative.  Vas med consulted for homegoing recommendations iso recent GIB and PMH of DVTs.  Pt completed course of ceftaroline 7/23-86; per ID will cont with Vanco for 6-8 weeks.  CT head pending.          C: Code Status/DPOA Info/Goals of Care/ACP Note    Full Code  DPOA/Contact Number: Shanthi Hemphill - Mobile Phone: [phone]     U: Unprescribing & Pertinent High-Risk Medications   Changes to home meds: None     Anticoagulation: Heparin gtt     Antibiotics:   [x]  MRSA, staph hemolyticus bacteremia --> Vanco for 6-8 weeks per ID.  Bactrim ppx indefinitely.      P: Pending Tests at the Time of Transfer   CT head to r/o Spectric emboli/mycotic aneurysm        A: Active consultants, including Rehab:     [x]  Subspecialty Consultants:  Vasc med and ID   [x]  PT  [x]  OT  []  SLP  [x]  Wound Care    U: Uncertainty Measure/Diagnostic Pause:    Working diagnosis at the time of transfer MRSA bacteremia with TV endocarditis     Diagnosis Degree of Certainty: 1. High degree of certainty about the clinical diagnosis.     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  Previously negative   #r/o to r/o Spectric emboli/mycotic aneurysm --> CT head pending   #ESRD (MWF) via rt fem line --> No acute RRT needs today   #Pain--> Oxy 5 mg q4, Dilaudid 0.2 mg IVP   #Hypotension--> Normotensive on Midodrine 20 mg TID; wean as tolerated   #PMHx of GIB   #DM1--> c/w Lantus and Lispro   #Septic arthritis of left wrist s/p washout--> Vanco 6-8 week course   #MRSA, Staph haemolyticus bacteremia--> s/p Ceftaroline   # follow up outpt with Endo d/t increase in synthroid      To-do list prior to transfer:  [x]CT head             E: Exam, including Lines/Drains/Airways & Data Review:   Constitutional: Ill appearing male in NAD  Eyes: anicteric  ENMT: MMM  Head/Neck:  AT/NC  Respiratory/Thorax: Lungs CTA bilaterally, non-labored breathing, no cough, on RA  Cardiovascular: Regular, rate and rhythm, no murmurs, normal S1 and S2  Gastrointestinal: Nondistended, soft, non-tender, BS present x 4  Musculoskeletal: RUE and Left AKA.  LUE swelling to wrist w/ace wrap in place.  Neurological: alert and oriented x 3, speech clear, follows commands No focal deficits  Skin: Warm and dry    Current Vital Signs:  Heart Rate: 83 (08/06/24 1700 : Linda Georges RN)  BP: 102/66 (08/06/24 1700 : Linda Georges RN)  Temp: 36.3 °C (97.3 °F) (08/06/24 1500 : Jimy Vance)  Resp: 11 (08/06/24 1700 : Linda Georges, RN)  SpO2: 95 % (08/06/24 1700 : Linda Georges, RN)    Accepting team, Amber, received verbal sign out and the Provider Care team/Attending has been updated. Bedside nurse will now call accepting nurse for report and patient will be transferred to 2024-FEDERICO Jimenez, APRN-CNP

## 2024-08-06 NOTE — PROGRESS NOTES
Edwar Hemphill   39 y.o.    @WT@  N/Room: 62245723/24/24-A    Subjective:  No acute events overnight.     Objective:   Required Pressers for some time.  Otherwise no major change.    Meds:   atorvastatin, 40 mg, Nightly  B complex-vitamin C-folic acid, 1 capsule, Daily  cholecalciferol, 50,000 Units, Once per day on Monday Thursday  epoetin tyson or biosimilar, 10,000 Units, Once per day on Monday Wednesday Friday  folic acid, 1 mg, Daily  insulin glargine, 8 Units, Nightly  insulin lispro, 0-15 Units, TID  insulin regular, 0-10 Units, Once  levothyroxine, 150 mcg, Daily before breakfast  melatonin, 6 mg, Nightly  midodrine, 20 mg, q8h  nystatin, 1 Application, BID  pantoprazole, 40 mg, Daily before breakfast  thiamine, 200 mg, Daily      heparin, Last Rate: 1,700 Units/hr (08/06/24 1318)      acetaminophen, 650 mg, q4h PRN  dextrose, 12.5 g, q15 min PRN  dextrose, 25 g, q15 min PRN  diphenhydrAMINE, 25 mg, q6h PRN  glucagon, 1 mg, q15 min PRN  glucagon, 1 mg, q15 min PRN  heparin, 1,000 Units, PRN  heparin, 1,000 Units, PRN  HYDROmorphone, 0.1 mg, q3h PRN  insulin regular, 2-6 Units, PRN  lidocaine, , 4x daily PRN  oxyCODONE, 5 mg, q4h PRN  vancomycin, , Daily PRN        Vitals:    08/06/24 1500   BP: 110/69   Pulse: 83   Resp: 10   Temp:    SpO2: 95%          Intake/Output Summary (Last 24 hours) at 8/6/2024 1519  Last data filed at 8/6/2024 1400  Gross per 24 hour   Intake 720.45 ml   Output 0 ml   Net 720.45 ml       General appearance: no distress, lethargic  Heart: normal S1S2  Lungs: CTA bilat  no crepitations  Abdomen: soft, nt/nd  Extremities: trace edema bilat  Access: femoral TDC     Blood Labs:  Results for orders placed or performed during the hospital encounter of 07/22/24 (from the past 24 hour(s))   POCT GLUCOSE   Result Value Ref Range    POCT Glucose 193 (H) 74 - 99 mg/dL   POCT GLUCOSE   Result Value Ref Range    POCT Glucose 146 (H) 74 - 99 mg/dL   POCT GLUCOSE   Result Value Ref Range    POCT  Glucose 93 74 - 99 mg/dL   POCT GLUCOSE   Result Value Ref Range    POCT Glucose 150 (H) 74 - 99 mg/dL   CBC and Auto Differential   Result Value Ref Range    WBC 8.5 4.4 - 11.3 x10*3/uL    nRBC 0.0 0.0 - 0.0 /100 WBCs    RBC 2.63 (L) 4.50 - 5.90 x10*6/uL    Hemoglobin 7.9 (L) 13.5 - 17.5 g/dL    Hematocrit 23.9 (L) 41.0 - 52.0 %    MCV 91 80 - 100 fL    MCH 30.0 26.0 - 34.0 pg    MCHC 33.1 32.0 - 36.0 g/dL    RDW 21.3 (H) 11.5 - 14.5 %    Platelets 48 (L) 150 - 450 x10*3/uL    Neutrophils % 72.8 40.0 - 80.0 %    Immature Granulocytes %, Automated 0.5 0.0 - 0.9 %    Lymphocytes % 16.1 13.0 - 44.0 %    Monocytes % 7.9 2.0 - 10.0 %    Eosinophils % 1.2 0.0 - 6.0 %    Basophils % 1.5 0.0 - 2.0 %    Neutrophils Absolute 6.16 1.20 - 7.70 x10*3/uL    Immature Granulocytes Absolute, Automated 0.04 0.00 - 0.70 x10*3/uL    Lymphocytes Absolute 1.36 1.20 - 4.80 x10*3/uL    Monocytes Absolute 0.67 0.10 - 1.00 x10*3/uL    Eosinophils Absolute 0.10 0.00 - 0.70 x10*3/uL    Basophils Absolute 0.13 (H) 0.00 - 0.10 x10*3/uL   Comprehensive Metabolic Panel   Result Value Ref Range    Glucose 151 (H) 74 - 99 mg/dL    Sodium 131 (L) 136 - 145 mmol/L    Potassium 4.6 3.5 - 5.3 mmol/L    Chloride 96 (L) 98 - 107 mmol/L    Bicarbonate 25 21 - 32 mmol/L    Anion Gap 15 10 - 20 mmol/L    Urea Nitrogen 31 (H) 6 - 23 mg/dL    Creatinine 4.93 (H) 0.50 - 1.30 mg/dL    eGFR 14 (L) >60 mL/min/1.73m*2    Calcium 9.0 8.6 - 10.6 mg/dL    Albumin 2.5 (L) 3.4 - 5.0 g/dL    Alkaline Phosphatase 210 (H) 33 - 120 U/L    Total Protein 5.7 (L) 6.4 - 8.2 g/dL    AST 16 9 - 39 U/L    Bilirubin, Total 0.6 0.0 - 1.2 mg/dL    ALT 15 10 - 52 U/L   Magnesium   Result Value Ref Range    Magnesium 1.96 1.60 - 2.40 mg/dL   Phosphorus   Result Value Ref Range    Phosphorus 5.3 (H) 2.5 - 4.9 mg/dL   Heparin Assay, UFH   Result Value Ref Range    Heparin Unfractionated 0.4 See Comment Below for Therapeutic Ranges IU/mL   Morphology   Result Value Ref Range    RBC  Morphology See Below     Polychromasia Mild     Hypochromia Mild     Target Cells Few     Ovalocytes Few     Teardrop Cells Few    POCT GLUCOSE   Result Value Ref Range    POCT Glucose 143 (H) 74 - 99 mg/dL   POCT GLUCOSE   Result Value Ref Range    POCT Glucose 170 (H) 74 - 99 mg/dL          Edwar Hemphill is a  39 y.o.  Year old , with PMHx of ESRD on HD (MWF, right femoral line), HFrEF (EF 35-40% 5/2024), T2DM, PVD (s/p left AKA and right forearm amputation), multiple line-related DVT (RIJ, LIJ, RUE, LUE, RLE, LLE; on Eliquis prior to recent upper GI bleed 6/15) presenting from [facility] due to hypotension prior to dialysis. Admitted to MICU for septic shock 2/2 mrsa bacteremia. Nephrology consulted for dialysis needs.     Updates:  - He is on/off levophed  - Last blood cultures are clear, no plan to exchange catheter  - last SLED done 8/2  - SLED Attempted but failed due to lack of access for pressers.    #ESRD on HD MWF via R femoral TDC  #Hx of multiple HD catheter infections, mrsa bacteremia   -HD at [facility], nephrologist Dr. Licona - last HD 7/19  Started on CVVH 7/23 due to hemodynamic instability. Stopped  - Switched to SLED, last session 8/2  -hemodynamics: unstable  - Last SLED attempt failed due to poor access     #Septic shock 2/2 staph aureus bacteremia  #TV infective endocarditis   -vancomycin, ceftaroline   -Bcx 7/25 staph, 7/27 - staph heamolyticus. Bcx 7/29 NGTD  - Midodrine 20 mg Q8 hours.  - Last cultures clear.    #Anemia   - epo        RECOMMENDATIONS:  - Arrange for Access for RRT  - No Indications for Dialysis today, but patient will need to undergo RRT soon.  - Supportive care as per ICU.  - Strict I/Os please.      Peng Daniel MD  Nephrology Fellow   Daytime / Weekend Renal Pager [phone]  After 7 pm Emergencies [phone] Pager [phone]

## 2024-08-06 NOTE — CARE PLAN
The patient's goals for the shift include      The clinical goals for the shift include will be able to gain more iv access      Problem: Pain - Adult  Goal: Verbalizes/displays adequate comfort level or baseline comfort level  Outcome: Progressing     Problem: Skin  Goal: Decreased wound size/increased tissue granulation at next dressing change  Outcome: Progressing  Flowsheets (Taken 8/6/2024 1002)  Decreased wound size/increased tissue granulation at next dressing change: Protective dressings over bony prominences  Goal: Participates in plan/prevention/treatment measures  Outcome: Progressing  Flowsheets (Taken 8/6/2024 1002)  Participates in plan/prevention/treatment measures: Discuss with provider PT/OT consult  Goal: Prevent/manage excess moisture  Outcome: Progressing  Flowsheets (Taken 8/6/2024 1002)  Prevent/manage excess moisture: Follow provider orders for dressing changes  Goal: Prevent/minimize sheer/friction injuries  Outcome: Progressing  Goal: Promote/optimize nutrition  Outcome: Progressing     Problem: Diabetes  Goal: Maintain electrolyte levels within acceptable range throughout shift  Outcome: Progressing  Goal: Maintain glucose levels >70mg/dl to <250mg/dl throughout shift  Outcome: Progressing     Problem: Fall/Injury  Goal: Not fall by end of shift  Outcome: Progressing  Goal: Be free from injury by end of the shift  Outcome: Progressing  Goal: Verbalize understanding of personal risk factors for fall in the hospital  Outcome: Progressing

## 2024-08-06 NOTE — NURSING NOTE
Patient with TABLO orders. Patients only access is iHD line which has been used to infuse medications through both ports since 8/3. RN notified fellow and attending of this informing that if TABLO is running, Heparin gtt will have to be turned off and patient will miss dose of antibiotics. Fellow at bedside to look for additional access with ultrasound. Due to patients amputations and poor vasculature, MD unable to find any additional peripheral or central access. Per MD, do not stop infusions to run TABLO.

## 2024-08-06 NOTE — PROGRESS NOTES
MICU to South Transfer Summary     I:  ICU Admission Reason & Brief ICU Course:    Edwar Hemphill is a 39 y.o. year old male patient admitted on 7/22/2024 with septic shock 2/2 infective endocarditis.         C: Code Status/DPOA Info/Goals of Care/ACP Note    Full Code  DPOA/Contact Number: Sahnthi Hemphill - Mobile Phone: [phone]     U: Unprescribing & Pertinent High-Risk Medications    Changes to home meds: None     Anticoagulation: Heparin gtt    Antibiotics:   [x]  MRSA, staph hemolyticus bacteremia --> Vanco for 6-8 weeks per ID.  Bactrim ppx indefinitely.      P: Pending Tests at the Time of Transfer   CT head to r/o Spectric emboli/mycotic aneurysm        A: Active consultants, including Rehab:   [x]  Subspecialty Consultants:  Vasc med and ID   [x]  PT  [x]  OT  []  SLP  [x]  Wound Care    U: Uncertainty Measure/Diagnostic Pause:    Working diagnosis at the time of transfer MRSA bacteremia with TV endocarditis    Diagnosis Degree of Certainty: 1. High degree of certainty about the clinical diagnosis.     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  Previously negative   #r/o to r/o Spectric emboli/mycotic aneurysm --> CT head pending   #ESRD (MWF) via rt fem line --> No acute RRT needs today   #Pain--> Oxy 5 mg q4, Dilaudid 0.2 mg IVP   #Hypotension--> Normotensive on Midodrine 20 mg TID; wean as tolerated   #PMHx of GIB   #DM1--> c/w Lantus and Lispro   #Septic arthritis of left wrist s/p washout--> Vanco 6-8 week course   #MRSA, Staph haemolyticus bacteremia--> s/p Ceftaroline   # follow up outpt with Endo d/t increase in synthroid     To-do list prior to transfer:  [x]CT head         E: Exam, including Lines/Drains/Airways & Data Review:   Constitutional: Ill appearing male in NAD  Eyes: anicteric  ENMT: MMM  Head/Neck: AT/NC  Respiratory/Thorax: Lungs CTA bilaterally, non-labored breathing, no  cough, on RA  Cardiovascular: Regular, rate and rhythm, no murmurs, normal S1 and S2  Gastrointestinal: Nondistended, soft, non-tender, BS present x 4  Musculoskeletal: RUE and Left AKA.  LUE swelling to wrist w/ace wrap in place.  Neurological: alert and oriented x 3, speech clear, follows commands No focal deficits  Skin: Warm and dry    Difficult airway? N/A  Lines/drains assessed for removal? No    Within 30 minutes of the patient physically leaving the floor, a Floor Readiness Note needs to be placed with updated vitals.      Medical Intensive Care - Daily Progress Note   Subjective    Edwar Hemphill is a 39 y.o. year old male patient admitted on 7/22/2024 with following ICU needs: SLED     Interval History:  Off norepinephrine since 8/4/24 @ 1900. C/w midodrine to maintain MAPs >65.  Spot dosing with dilaudid for wrist pain.      Meds    Scheduled medications  atorvastatin, 40 mg, oral, Nightly  B complex-vitamin C-folic acid, 1 capsule, oral, Daily  ceftaroline, 200 mg, intravenous, q8h  cholecalciferol, 50,000 Units, oral, Once per day on Monday Thursday  epoetin tyson or biosimilar, 10,000 Units, subcutaneous, Once per day on Monday Wednesday Friday  folic acid, 1 mg, oral, Daily  insulin glargine, 8 Units, subcutaneous, Nightly  insulin lispro, 0-15 Units, subcutaneous, TID  insulin regular, 0-10 Units, intravenous, Once  levothyroxine, 150 mcg, oral, Daily before breakfast  melatonin, 6 mg, oral, Nightly  midodrine, 20 mg, oral, q8h  nystatin, 1 Application, Topical, BID  pantoprazole, 40 mg, oral, Daily before breakfast  thiamine, 200 mg, oral, Daily      Continuous medications  heparin, 0-4,500 Units/hr, Last Rate: 1,700 Units/hr (08/05/24 2348)  norepinephrine, 0.01-1 mcg/kg/min (Dosing Weight), Last Rate: Stopped (08/04/24 1900)      PRN medications  PRN medications: acetaminophen **OR** [DISCONTINUED] acetaminophen **OR** [DISCONTINUED] acetaminophen, dextrose, dextrose, diphenhydrAMINE, glucagon,  "glucagon, heparin, heparin, insulin regular, lidocaine, oxyCODONE, vancomycin     Objective    Blood pressure 105/76, pulse 73, temperature 36.1 °C (97 °F), temperature source Temporal, resp. rate 10, height 1.753 m (5' 9.02\"), weight 75.4 kg (166 lb 3.6 oz), SpO2 91%.     Physical Exam   Constitutional: Ill appearing male in NAD  Eyes: anicteric  ENMT: MMM  Head/Neck: AT/NC  Respiratory/Thorax: Lungs CTA bilaterally, non-labored breathing, no cough, on RA  Cardiovascular: Regular, rate and rhythm, no murmurs, normal S1 and S2  Gastrointestinal: Nondistended, soft, non-tender, BS present x 4  Musculoskeletal: RUE and Left AKA.  LUE swelling to wrist w/ace wrap in place.  Neurological: alert and oriented x 3, speech clear, follows commands No focal deficits  Skin: Warm and dry    Intake/Output Summary (Last 24 hours) at 8/6/2024 0704  Last data filed at 8/6/2024 0600  Gross per 24 hour   Intake 748 ml   Output 30 ml   Net 718 ml     Labs:   Results from last 72 hours   Lab Units 08/05/24  0402 08/04/24  0448   SODIUM mmol/L 130* 132*   POTASSIUM mmol/L 4.4 3.8   CHLORIDE mmol/L 96* 97*   CO2 mmol/L 25 25   BUN mg/dL 25* 18   CREATININE mg/dL 4.21* 3.33*   GLUCOSE mg/dL 392* 142*   CALCIUM mg/dL 8.3* 8.5*   ANION GAP mmol/L 13 14   EGFR mL/min/1.73m*2 17* 23*   PHOSPHORUS mg/dL 4.8 4.4      Results from last 72 hours   Lab Units 08/06/24  0610 08/05/24  0402 08/04/24  0448   WBC AUTO x10*3/uL 8.5 8.9 11.1   HEMOGLOBIN g/dL 7.9* 7.3* 7.8*   HEMATOCRIT % 23.9* 21.5* 24.9*   PLATELETS AUTO x10*3/uL 48* 42* 55*   NEUTROS PCT AUTO %  --  75.0 72.2   LYMPHS PCT AUTO %  --  14.5 17.3   MONOS PCT AUTO %  --  7.7 8.5   EOS PCT AUTO %  --  0.8 0.5        Micro/ID:     Lab Results   Component Value Date    BLOODCULT No growth at 4 days -  FINAL REPORT 07/31/2024    BLOODCULT No growth at 4 days -  FINAL REPORT 07/31/2024       Summary of key imaging results from the last 24 hours  None    Assessment and Plan     Assessment:  " Edwar Hemphill is a 39 y.o. year old male patient admitted on 7/22/2024 with septic shock 2/2 infective endocarditis.     Mechanical Ventilation: none  Sedation/Analgesia:  none  Restraints: no    Summary for 08/06/24  :  [facility]  No plans for additional IV placement d/t coagulopathy  Follow up on thrombocytopenia w/o  C/w heparin gtt   Obtaining CT head w/o contrast to r/o Spectric emboli/mycotic aneurysm   Follow up with GI for ac clearance per Vasc Med request     Plan:  Assessment: Edwar Hemphill is a 39 y.o. year old male patient admitted on 7/22/2024 with septic shock 2/2 infective endocarditis.     Mechanical Ventilation: none  Sedation/Analgesia:  none  Restraints: no     Summary for 08/06/24  :  HD stable off levo  Consult Vasc Med  CT head planned   Address lack of access for Atb, heparin gtt administration during SLED   No indication of RRT today; lst session on 8/2/24    Plan:  NEUROLOGY/PSYCH:  Dx:  PMHx of leaving AMA  Left wrist pain  C/f septic emboli/mycotic aneurysm   Management:  S/p Dilaudid 0.1 mg IVP this AM for left wrist pain  C/w Oxy 5 mg PRN q4   C/w acetaminophen PRN   Melatonin HS   Thiamine daily   Obtain CT head w/o contrast      CARDIOVASCULAR:  Dx:  Infective endocarditis with TV vegetation, Severely attenuated superior vena cava, bilateral brachiocephalic, subclavian and internal jugular veins with the calcifications in the brachiocephalic veins with extensive collaterals in the chest wall likely representing chronic thrombosis. Patient had vegectomy aborted due to to concerns for infected thrombus in the IVC. Persistent hypotension.   Management:  Weaned off Norepi on 8/4  C/w midodrine 20mg TID (wean as tolerated)      PULMONARY:  Dx:  NAVI  Management:  NA     RENAL/GENITOURINARY:  Dx:  ESRD on iHD MWF via Rt fem HD line (IR placed)  Management:  Plan for SLED on 8/5 w/1 liter removal   Hold off on IR HD line placement pending cultures  Epoetin  10,000 MWF  Renal MV   Nystatin      GASTROENTEROLOGY:  Dx:  Esophagitis  GERD  Management:  PPI   C/w regular diet         ENDOCRINOLOGY:  Dx:  DM1  Hypothyroidism   Management:  Insulin gtt stopped on 8/5  Re-started Lispro TID and Lantus 8 units HS   [id number] mcg daily  Follow up outpt with Endo d/t increase in synthroid      HEMATOLOGY:  Dx:  Anemia of chronic disease   Thrombocytopenia (r/o HIT)   Management:  C/w heparin gtt  CBC daily  Obtain PF4 d/t c/f HIT      MUSCULOSKELETAL/ SKIN:  Dx:  Septic arthritis of Left wrist  Culture growing MRSA  Management:  Vanco (6-8 week course per ID rec)      INFECTIOUS DISEASE:  Dx:  MRSA, Staph haemolyticus bacteremia.  Hx of Acinetobacter  Culture from 7/29 negative, Culture from 7/31 NGTD  Management:  ID following  Vanco (planned for 6-8 weeks per ID)   Ceftaroline stop date 8/6    ICU Check List              ICU Liberation: Intervention:   Assess, Prevent, Manage Pain CPOT - NA   Both SAT and SBT [] SAT  [] SBT 30-60 min [] Extubate to NC  [] Extubate to NIV  [] Discuss Trach   Choice of analgesia and sedation RASS: 0  none NA   Delirium: Assess, prevent and manage CAM - NA   Early Mobility and Exercise   [x] PT /OT consult   Family Engagement and Empowerment [x]Family updated [x]SW consult      FEN  Fluids: PRN  Electrolytes: HD  Nutrition: Regular diet  Prophylaxis:  DVT ppx: Heparin gtt  GI ppx: PPI  Bowel care: NA  Hardware:  Catheter: NA  Access: Rt fem HD line  Drains: NA  Social:  Code: Full Code    HPOA:  Shanthi Hemphill - Mobile Phone: [phone] --> POC discussed at the bedside on 8/5/24  Disposition: MICU with receiving SLED; will transition to RNF when tolerating iHD.      Kobi Jimenez, AMARILYS-CNP   08/06/24 at 7:04 AM     Disclaimer: Documentation completed with the information available at the time of input. The times in the chart may not be reflective of actual patient care times, interventions, or procedures. Documentation occurs after  the physical care of the patient.

## 2024-08-06 NOTE — PROGRESS NOTES
Edwar Hemphill is a 39 y.o. male on day 15 of admission presenting with Septic shock (Multi).      Subjective   Patient was seen at bedside. He states that he is in pain specifically in his bilateral hands.        Objective     Last Recorded Vitals  /66   Pulse 83   Temp 36.3 °C (97.3 °F) (Temporal)   Resp 11   Wt 75.4 kg (166 lb 3.6 oz)   SpO2 95%   Intake/Output last 3 Shifts:    Intake/Output Summary (Last 24 hours) at 8/6/2024 1746  Last data filed at 8/6/2024 1728  Gross per 24 hour   Intake 659.38 ml   Output 0 ml   Net 659.38 ml       Admission Weight  Weight: 68.5 kg (151 lb) (07/22/24 1031)    Daily Weight  07/25/24 : 75.4 kg (166 lb 3.6 oz)    Image Results  CT head wo IV contrast  Narrative: Interpreted By:  Nakul Wang,   STUDY:  CT HEAD WO IV CONTRAST;  8/6/2024 4:32 pm      INDICATION:  Signs/Symptoms:c/f emboli iso large PFO.      COMPARISON:  None.      ACCESSION NUMBER(S):  [id number]      ORDERING CLINICIAN:  ANGELIKA SELLERS      TECHNIQUE:  Noncontrast enhanced CT was performed from the skullbase to the  vertex.      FINDINGS:  There is minimal prominence of the cortical sulci and sylvian  fissures without hydrocephalus. There is no evidence of intracranial  mass or extra-axial collection. The skull base, paranasal sinuses and  orbital structures are normal. The calvarium is normal.      Impression: Minimal cortical volume loss without hydrocephalus, hemorrhage or  extra-axial collection.      MACRO:  none      Signed by: Nakul Wang 8/6/2024 4:43 PM  Dictation workstation:   [id number]      Physical Exam  Constitutional:       Appearance: Normal appearance. He is ill-appearing.   HENT:      Head: Normocephalic and atraumatic.   Eyes:      Extraocular Movements: Extraocular movements intact.      Conjunctiva/sclera: Conjunctivae normal.      Pupils: Pupils are equal, round, and reactive to light.   Cardiovascular:      Rate and Rhythm: Normal rate and regular rhythm.       Pulses: Normal pulses.      Heart sounds: Normal heart sounds.   Pulmonary:      Effort: Pulmonary effort is normal. No respiratory distress.      Breath sounds: Normal breath sounds. No wheezing.   Abdominal:      General: Abdomen is flat. Bowel sounds are normal. There is no distension.      Palpations: Abdomen is soft.      Tenderness: There is no abdominal tenderness.   Musculoskeletal:      Right lower leg: Edema present.      Left lower leg: Edema present.      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   Bilateral pitting edema    Skin:     General: Skin is warm and dry.      Comments: Right femoral TDC    Neurological:      General: No focal deficit present.      Mental Status: He is alert and oriented to person, place, and time. Mental status is at baseline.         Relevant Results               Assessment/Plan   This patient currently has cardiac telemetry ordered; if you would like to modify or discontinue the telemetry order, click here to go to the orders activity to modify/discontinue the order.        Assessment  Patient is a 38 y/o male with a PMH of HTN, T2DM, hypothyroidism, ESRD (HD MWF w/ right femoral line), PAD, Left AKA + RUE amputation 2/2 PAD, HFrEF, presented from dialysis on 7/22/24 due to hypotension. Admitted to the MICU due to septic shock found to be 2/2 tricuspid valve infective endocarditis and  MRSA/S. Haemolyticus bacteremia requiring pressors treating with Vancomycin  Also being treated for MRSA septic arthritis of the left wrist s/p washout. Currently being treated with Vancomycin. Transferred to the floor for further management of MRSA/S.Haemolyticus bacteremia and septic arthritis.     #Septic shock 2/2 MRSA/ S.Haemolyticus bacteremia requiring pressors  #MRSA/ S.Haemolyticus bacteremia   #Tricupsid valve infective endocarditis   #PFO   #Hx of DVT   #r/o sepctic emboli   - TTE 7/23 showed thickened TV and mobile densities associated with the  TV with severe TR, not seen previously (TTE 5/14/24 with trivial TR and normal TV), suspicious for endocarditis   - POLI 7/24 EF 40 and global hypokinesis, large vegetation encasing femoral TDC with involvement and destruction of septal leaflet of TV and severe TR. Small mobile echodensity on aortic valve, concerning for vegetation vs degenerative change. Moderate patent foramen ovale.   - CTA showed  showed Severely attenuated superior vena cava, bilateral brachiocephalic, subclavian and internal jugular veins with the calcifications in the brachiocephalic veins with extensive collaterals in the chest wall likely representing chronic thrombosis   - Unable to remove femoral TDC as a source of infection given DVTs in subclavian, LIJ, b/l innominate occlusion and complete occlusion of intrahepatic IVC its hard to find another access for HD. Multidisciplinary decision with vascular, IR, nephrology, and cardiology   - Blood cultures  7/22: MRSA vancomycin, bactrim susceptible   7/25: S. Aureus  7/27 S. Haemolyticus tetracycline, vancomycin suspectible   7/29: No growth 4 days FINAL x2  7/31: No growth 4 days   - Antibiotics   Vancomycin (7/22-p)   Ceftaroline (7/23- 8/6)   Zosyn (7/22-7/23)  - There is concern for septic emboli to the brain given his moderate PFO and extensive thrombi throughout his body. CT head shows minimal cortical volume loss without hydrocephalus, hemorrhage or extra-axial collection. No evidence of septic emboli  Plan:   - Continue Vancomycin   - Continue heparin gtt  - ID consulted,   Recommend Vancomycin for 6-8 weeks    Bactrim ppx indefinitely         #Septic Arthritis   - Patient complained of left wrist pain. Fluid culture was collected which grew MRSA. Is s/p wound washout with ortho on 7/29. Was treated with Vancomycin and Ceftaroline with Ceftaroline discontinued on 8/6.   - Ortho following   - ID following  Plan:   - Continue Vancomycin   - Follow-up ortho recs   - Follow-up ID recs      #ESRD (HD MWF right femoral line)   #Anemia 2/2 ESRD  - Nephrology consulted for dialysis needs in setting of thrombosis of right femoral line for dialysis access.   - Started on CVVH 7/23 due to hemodynamic instability. Stopped CVVH and was switched to SLED.   - Last SLED session 8/2   - Hgb 7/9 8.6. Currently on Epo subcutaneous three times weekly   Plan:   - Nephrology consulted - dialysis as indicated per nephrology recs  Arrange access for RRT   No indications for dialysis 8/6. Will need to undergo RRT soon   Strict I/Os    #HTN   #HFrEF  #Hx of paroxysmal A Fib  #PAD   - Currently normotensive with Midodrine 20 mg q8 hours   - Home medications: midodrine 10 mg q4 hours, atorvastatin 40 mg daily   Plan:   - Continue Midodrine 20 mg q8 hours   - Continue to monitor BP. Wean Midrodrine as tolerated     #Thrombocytopenia   #Hx of GI Bleed  - In the MICU had a drop in platelets from 65 > 37. Was transfused two units of platelets on 7/24 and platelets increased to 57.    - Platelets 48 8/6 > 42. Due to his low platelets, there is concern for HIT. PF4 ordered   - Patient has a history of recent GI bleed on 6/[facility] at [facility]. Due to his extensive history of multiple DVTs patient was on Eliquis, however, Eliquis was held due to the recent GI bleed.   Plan:   - Daily CBC   - Follow-up PF4  - Continue to monitor platelets     #Diabetes Mellitus  - Unclear Type 1 vs Type 2 DM   - Current insulin regimen:Lispro TID and Lantus 6 units at bedtime  - BG 8/6 122-170 and within goal   Plan:  - Continue current regimen. Adjust as needed based on Lispro requirements     #Hypothyroidism   - Levothyroxine 150 mcg daily   - Home medications: Levothyroxine 125 mcg daily   Plan:  - Continue Levothyroxine 150 mcg daily   - Follow up outpatient with endocrine due to dose increase    Fluids: PRN  Electrolytes: k>4 mg>2 caution as ESRD  Nutrition: renal diet  DVT ppx: sub q heparin  GI ppx: PPI  Bowel care: Miralax prn  Access:  External  Jugular PIV,  Femoral line     Code: Full Code    NOK: Shanthi Hemphill (Mother) [phone]      Patient was seen and discussed with senior resident, MD Alex Kinsey MD  Internal Medicine/Pediatrics, PGY-1

## 2024-08-06 NOTE — PROGRESS NOTES
Subjective   Edwar Hemphill is a 39 y.o. male on day 15 of admission   VM re-engaged for home going AC regimen   Complaints of left arm/wrist pain   No new SO/CP   No leg pain   No bleeding     Objective   Physical Exam  Vitals:    08/06/24 0900 08/06/24 1000 08/06/24 1100 08/06/24 1144   BP: 116/85 108/76 113/76    BP Location:       Patient Position:       Pulse: 77 82 90    Resp: 11 14 15    Temp:    36.2 °C (97.2 °F)   TempSrc:    Temporal   SpO2:  96% 95%    Weight:       Height:          General: fatigue/sleepy  Neuro: alert and oriented x3  CV:  RRR  Lungs: CTA bilaterally  Abd:  Soft, benign, non-tender   Upper extremities: LUE wrapped  Lower extremities: TLE swelling   Left AKA, RUE amputation    Medications   Scheduled medications  atorvastatin, 40 mg, oral, Nightly  B complex-vitamin C-folic acid, 1 capsule, oral, Daily  cholecalciferol, 50,000 Units, oral, Once per day on Monday Thursday  epoetin tyson or biosimilar, 10,000 Units, subcutaneous, Once per day on Monday Wednesday Friday  folic acid, 1 mg, oral, Daily  insulin glargine, 8 Units, subcutaneous, Nightly  insulin lispro, 0-15 Units, subcutaneous, TID  insulin regular, 0-10 Units, intravenous, Once  levothyroxine, 150 mcg, oral, Daily before breakfast  melatonin, 6 mg, oral, Nightly  midodrine, 20 mg, oral, q8h  nystatin, 1 Application, Topical, BID  pantoprazole, 40 mg, oral, Daily before breakfast  thiamine, 200 mg, oral, Daily      Continuous medications  heparin, 0-4,500 Units/hr, Last Rate: 1,700 Units/hr (08/06/24 1318)      PRN medications  PRN medications: acetaminophen **OR** [DISCONTINUED] acetaminophen **OR** [DISCONTINUED] acetaminophen, dextrose, dextrose, diphenhydrAMINE, glucagon, glucagon, heparin, heparin, insulin regular, lidocaine, oxyCODONE, vancomycin     Lab Review   Recent Labs     08/06/24  0610 08/05/24  0402 08/04/24  0448 08/03/24  0604 08/02/24  0520 08/01/24  0542 07/31/24  0542 07/30/24  2302 07/30/24  0528   *  130* 132* 131* 132* 133* 136 135* 135*   K 4.6 4.4 3.8 3.5 4.9 4.6 4.7 5.0 4.1   CL 96* 96* 97* 96* 98 101 101 100 101   CO2 25 25 25 26 22 26 24 21 27   ANIONGAP 15 13 14 13 17 11 16 19 11   BUN 31* 25* 18 12 22 15 24* 22 17   CREATININE 4.93* 4.21* 3.33* 2.32* 3.23* 2.28* 3.15* 3.03* 2.39*   EGFR 14* 17* 23* 36* 24* 36* 25* 26* 34*   MG 1.96 1.89 1.84 1.77 2.03 2.03 2.16  --  2.08     Recent Labs     08/06/24  0610 08/05/24  0402 08/04/24  0448 08/03/24  0604 08/02/24  0520 09/14/23  1014 09/13/23  1351   ALBUMIN 2.5* 2.4* 2.5* 2.3* 2.4*   < > 2.5*   ALKPHOS 210* 211* 212* 194* 218*   < > 228*   ALT 15 13 13 14 19   < > <3*   AST 16 17 13 12 21   < > 13   BILITOT 0.6 0.6 0.7 0.8 0.6   < > 0.5   LIPASE  --   --   --   --   --   --  42    < > = values in this interval not displayed.     Recent Labs     08/06/24  0610 08/05/24 0402 08/04/24 0448 08/03/24  2358 08/03/24  0604 08/02/24  0520 08/01/24  2353 08/01/24  1754   WBC 8.5 8.9 11.1 11.1 8.4 11.7* 12.1* 10.6   HGB 7.9* 7.3* 7.8* 7.9* 7.9* 8.8* 8.9* 7.8*   HCT 23.9* 21.5* 24.9* 25.3* 24.8* 26.5* 26.9* 23.5*   PLT 48* 42* 55* 59* 45* 41* 53* 56*   MCV 91 88 93 96 95 89 90 89     Recent Labs     08/06/24  0610 08/05/24  0403 08/04/24  0450 08/03/24  0604 08/02/24  2129 08/02/24  1602 08/01/24  0542 08/01/24  0156 07/31/24  2112 07/31/24  1212 07/24/24  0345 07/23/24  0952 07/22/24  2224 09/13/23  1351 08/07/23  0617 07/11/23  0239 07/09/23  1210 07/09/23  0116   INR  --   --   --   --   --   --   --   --   --  1.3* 1.4*  --  1.4* 1.1 1.2* 1.6* 1.5* 1.6*   HAUF 0.4 0.4 0.5 0.6 0.3 <0.1 0.5 0.7   < >  --   --   --   --   --   --   --   --   --    HAPTOGLOBIN  --   --   --   --   --   --   --   --   --   --   --  167  --   --   --  199  --   --    FIBRINOGEN  --   --   --   --   --   --   --   --   --   --   --  201  --   --   --   --   --   --     < > = values in this interval not displayed.     PTT - 7/31/2024: 12:12 PM  1.3   14.5 31     Estimated Creatinine  Clearance: 19.9 mL/min (A) (by C-G formula based on SCr of 4.93 mg/dL (H)).    Recent Labs     11/11/22  1316   CHOL 111   LDLF 38   HDL 67.6   TRIG 27*     Lab Results   Component Value Date    HGBA1C 6.8 (H) 03/30/2024     Lab Results   Component Value Date    TSH 8.60 (H) 08/03/2024     Component 7/23/2024   Serum and Platelet Factor 4 0.118    Interpretation for Anti-Platelet Factor 4 Antibody Negative      Imaging  LE DVT US 7/24/24  Right Lower Venous: Unable to obtain right groin compression or visualize the DEIV, CFV, profunda and proximal FVs due to line placement. Can not rule out thrombus in non-visualized areas. Remainder visualized vessels show no evidence of DVT.  Left Lower Venous: There are chronic changes visualized in the common femoral and proximal femoral veins.    CT C/A/P 7/25/24  CHEST:  1. Interval development of bilateral pulmonary nodular opacities  several of them showing cavitation. Findings are concerning for  septic embolism/cavitary pneumonia.  2. Bilateral pleural effusions.  3. Right femoral central venous catheter with tip terminating in the  right ventricle. Hypodense thrombus around the catheter in the right  atrium correlating with the vegetation seen on prior echocardiogram.  4. Severely attenuated superior vena cava, bilateral brachiocephalic,  subclavian and internal jugular veins with the calcifications in the  brachiocephalic veins with extensive collaterals in the chest wall  likely representing chronic thrombosis.      ABDOMEN-PELVIS:  1. Severely attenuated inferior vena cava with multiple  calcifications, consistent with chronic IVC thrombosis.  2. Interval development of a T10 vertebral body fracture.  Re-demonstration of severe osseous changes throughout the  thoracolumbar spine. Findings are likely secondary to renal  osteodystrophy.  3. Diffuse thickening of the colon and rectum with the pericolonic  stranding consistent with the proctocolitis which can  be  inflammatory/infectious in etiology. Correlate with clinical symptoms.  4. Diffuse mesenteric haziness and anasarca likely representing fluid  overload.  5. Bilateral atrophied kidneys with hypoenhancement, representative  of medical renal disease.    Assessment/Plan   Edwar Hemphill is a 39 y.o. male with PMHx of HTN, HLD, DM, CM, pAfib, ESRD on IHD (via lines), PAD, Left AKA, RUE amputation, hypothyroidism, smoking. Admitted for TV IEC 2/2 dialysis line infection c/b septic shock. Vascular medicine is following for AC recommendations     _ TV IEC in setting of R Fem line (terminating in the R ventricle) thrombosis, with septic emboli   No intervention done given his co morbidities   With plan for IV Abx followed by long term suppression Abx   _ h/o Multiple line related DVTs, with line in place    _ b/l internal jugular/SCV/innominate narrowing/occlusion, complete occlusion of intrahepatic IVC s/p angioplasty   _ PFO  _ Afib  _ GIB 2/2 Esophagitis, recently taken off Eliquis   _ Anemia   _ ESRD, access is an issue   _ Thrombocytopenia, HIT ruled out   _ Was briefly on prophylaxis AC (few doses), heparin gtt started on 8/2     Plan   --- If ok by the primary team/consultants, continue heparin GTT with close monitoring to bleeding, PLTs needs to be steady above at least 45K to safely Anticoagulate   --- Work up of thrombocytopenia per primary team/haematology   --- Please rule out brain Spectric emboli/mycotic aneurysm   --- Appreciate GI clearance of home-going AC   --- When there is no concerns about above, and no plans for interventions/surgery, can start bridging to Coumadin given ESRD     Above discussed in details with the Pt, mom and primary team    Nhung Carrillo MD

## 2024-08-06 NOTE — OP NOTE
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]     * Infection of left wrist (Multi) [M00.9]     Procedures  Debridement Wrist  [id number] - ID ARTHRS WRST EXC&/RPR TRIANG FIBROCART&/JOINT      Surgeons      * Will B Beucler - Primary    Resident/Fellow/Other Assistant:  Surgeons and Role:     * Maria Eugenia Vila MD - Resident - Assisting    Procedure Summary  Anesthesia: General  ASA: IV  Anesthesia Staff: Anesthesiologist: Shana Vital MD  Anesthesia Resident: Bright Gurrola DO  Estimated Blood Loss: 5mL  Intra-op Medications:   Administrations occurring from 2100 to 2245 on 24:   Medication Name Total Dose   vancomycin (Vancocin) vial for injection 1 g   atorvastatin (Lipitor) tablet 40 mg Cannot be calculated   B complex-vitamin C-folic acid (Nephrocaps) capsule 1 capsule Cannot be calculated   cholecalciferol (Vitamin D-3) capsule 50,000 Units Cannot be calculated   dextrose 50 % injection 12.5 g Cannot be calculated   dextrose 50 % injection 25 g Cannot be calculated   diphenhydrAMINE (BENADryl) capsule 25 mg Cannot be calculated   epoetin tyson (Epogen,Procrit) injection 10,000 Units Cannot be calculated   folic acid (Folvite) tablet 1 mg Cannot be calculated   glucagon (Glucagen) injection 1 mg Cannot be calculated   glucagon (Glucagen) injection 1 mg Cannot be calculated   heparin 1,000 unit/mL injection 1,000 Units Cannot be calculated   heparin 1,000 unit/mL injection 1,000 Units Cannot be calculated   lidocaine (LMX) 4 % cream Cannot be calculated   melatonin tablet 6 mg Cannot be  calculated   nystatin (Mycostatin) 100,000 unit/gram powder 1 Application Cannot be calculated   thiamine (Vitamin B-1) tablet 200 mg Cannot be calculated   vancomycin (Vancocin) pharmacy to dose - pharmacy monitoring Cannot be calculated   ceftaroline (Teflaro) 400 mg in dextrose 5% 50 mL IV Cannot be calculated   fentaNYL PF (Sublimaze) injection 75 mcg   insulin glargine (Lantus) injection 5 Units Cannot be calculated   insulin lispro (HumaLOG) injection 0-10 Units Cannot be calculated   levothyroxine (Synthroid, Levoxyl) tablet 125 mcg Cannot be calculated   midazolam (Versed) injection 2 mg   midodrine (Proamatine) tablet 10 mg Cannot be calculated   norepinephrine (Levophed) 8 mg in dextrose 5% 250 mL (0.032 mg/mL) infusion (premix) 0.03 mg   pantoprazole (ProtoNix) EC tablet 40 mg Cannot be calculated         Intraprocedure I/O Totals       None           Specimen:   ID Type Source Tests Collected by Time   A : left wrist wound Swab ABSCESS FUNGAL CULTURE/SMEAR, TISSUE/WOUND CULTURE/SMEAR Will B Beucler, DO 2024   B : left wrist wound Swab ABSCESS FUNGAL CULTURE/SMEAR, TISSUE/WOUND CULTURE/SMEAR Will B Beucler, DO 2024        Staff:   Circulator: Noemí Gallego Person: Bridger         Drains and/or Catheters:   [REMOVED] Rectal Tube (Removed)   Rectal Tube Output 150 mL 24 0800       Tourniquet Times:   * Missing tourniquet times found for documented tourniquets in lo *     Implants:     Findings: Purulent fluid in left radiocarpal joint    Indications: Edwar Hemphill is an 39 y.o. 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  Positive cultures after the left wrist aspiration    The patient was seen in the preoperative area. The risks, benefits, complications, treatment options, non-operative alternatives, expected recovery and outcomes were discussed  with the patient. The possibilities of reaction to medication, pulmonary aspiration, injury to surrounding structures, bleeding, recurrent infection, the need for additional procedures, failure to diagnose a condition, and creating a complication requiring transfusion or operation were discussed with the patient. The patient concurred with the proposed plan, giving informed consent.  The site of surgery was properly noted/marked if necessary per policy. The patient has been actively warmed in preoperative area. Preoperative antibiotics have been given.  He remained on the scheduled antibiotics as he has been hospitalized for sepsis.  Venous thrombosis prophylaxis has been given per admitting team    Procedure Details: Indications for procedure:     I met and evaluated the patient in the preoperative holding area today prior to the patient receiving any sedation or other medications which may alter their mental status. I confirmed their identity via the facility's protocol. I reviewed the patient's history, physical exam, and recommendation for surgery. The indications for surgical versus nonsurgical management of the condition were discussed, including the inherent risks, benefits, prognosis of the condition.  The risks of surgery include, but are not limited to, pain, bleeding, infection, tendon damage, nerve damage, vessel damage, and need for further surgery.  The risks also include wound healing problems, decreased long-term functionality of the wrist and hand, tendon irritation, tendon rupture, and possible need for therapy for an optimum outcome.  There is a risk of complex regional pain syndrome, incomplete recovery, incomplete relief or worsening of symptoms, and recurrence.  We also discussed that medical complications are possible during and after surgery related to either anesthesia or the surgical procedure itself. Specific discussion of the risks of the proposed anesthetic plan will be discussed by the  patient's anesthesia provider. All risks were reviewed in layman´s terms. The patient was given ample time to ask appropriate questions and appeared to be satisfied with the answers provided. All questions were answered and no guarantees have been given regarding the patient's condition and treatment recommendations. The general plan for the postoperative rehabilitation course, including possible need for therapy, was reviewed at great length. Informed consent was signed by all appropriate parties. The surgical site was marked in ink by myself.        Procedure in Detail:  The patient was transported to the operating room and placed in the supine position on the operating table. All extremities and prominences were appropriately padded. Adequate anesthesia was induced. A non-sterile tourniquet was applied high on the left arm. The left arm was prepped and draped in standard sterile fashion. A time-out was conducted prior to beginning the operation to confirm the patient's identity, planned procedures, laterality of procedures, and that appropriate antibiotics had been administered within 30 minutes of incision. The left upper extremity was exsanguinated with gravity elevation and the tourniquet was inflated to 250mmHg.     A dorsal longitudinal incision was made just ulnar to Levar's tubercle in line with the third metacarpal.  Full-thickness skin flaps were created.  Blunt dissection was carried down to the extensor retinaculum.  Care was taken not to damage any dorsal sensory branches.  EPL tendon had already been transposed from a previous surgery.  The second dorsal compartments were identified.  The fourth dorsal compartments were identified.  These were retracted.  The second dorsal compartments tendons were retracted radially and the fourth and retracted ulnarly.  A 15 blade was used to make a longitudinal incision in the dorsal wrist capsule.  Upon entering the radiocarpal and midcarpal joint there was  significant amount of purulent fluid.  Multiple cultures were obtained.  The radiocarpal and midcarpal joints were debrided with a 15 blade and a rongeur of synovium.  The wound was copiously irrigated with 3 L normal saline.  Irrisept solution was used as well.  The wound was then copiously irrigated once more with normal saline. The capsule was loosely approximated with 0 PDS suture.  The skin was closed with 3-0 nylon suture.  A bulky sterile dressing was placed.     The patient was transferred to the hospital bed and transported to the post-anesthesia care unit in good condition having tolerated the procedure well. All sponge, needle, and instrument counts were correct x2. Postoperatively, the digits were pink and well perfused and the hand compartments and soft tissues were supple. No complications were apparent.    Postoperative Plan:  The patient will be NWB on their operative site. Their dressing will be removed post op day 5.   They will return to clinic in 2 weeks. X-rays are not needed on return visit.  Complications:  None; patient tolerated the procedure well.    Disposition: PACU - hemodynamically stable.  Condition: stable         Additional Details: Purulent fluid in left wrist joint.    Attending Attestation: I performed the procedure.    Rodolfo Allen  Phone Number: [phone]

## 2024-08-06 NOTE — CARE PLAN
Problem: Skin  Goal: Decreased wound size/increased tissue granulation at next dressing change  Outcome: Progressing  Flowsheets (Taken 8/5/2024 2020)  Decreased wound size/increased tissue granulation at next dressing change:   Utilize specialty bed per algorithm   Promote sleep for wound healing   Protective dressings over bony prominences  Goal: Participates in plan/prevention/treatment measures  Outcome: Progressing  Flowsheets (Taken 8/4/2024 2314)  Participates in plan/prevention/treatment measures:   Discuss with provider PT/OT consult   Elevate heels  Goal: Prevent/manage excess moisture  Outcome: Progressing  Flowsheets (Taken 8/4/2024 2314)  Prevent/manage excess moisture:   Cleanse incontinence/protect with barrier cream   Follow provider orders for dressing changes   Monitor for/manage infection if present   Moisturize dry skin  Goal: Prevent/minimize sheer/friction injuries  Outcome: Progressing  Flowsheets (Taken 8/4/2024 2314)  Prevent/minimize sheer/friction injuries:   Complete micro-shifts as needed if patient unable. Adjust patient position to relieve pressure points, not a full turn   Use pull sheet   HOB 30 degrees or less   Turn/reposition every 2 hours/use positioning/transfer devices   Utilize specialty bed per algorithm  Goal: Promote/optimize nutrition  Outcome: Progressing  Flowsheets (Taken 8/4/2024 2314)  Promote/optimize nutrition:   Monitor/record intake including meals   Consume > 50% meals/supplements   Offer water/supplements/favorite foods  Goal: Promote skin healing  Outcome: Progressing  Flowsheets (Taken 8/4/2024 2314)  Promote skin healing:   Assess skin/pad under line(s)/device(s)   Protective dressings over bony prominences   Turn/reposition every 2 hours/use positioning/transfer devices   Ensure correct size (line/device) and apply per  instructions   Rotate device position/do not position patient on device

## 2024-08-07 ENCOUNTER — APPOINTMENT (OUTPATIENT)
Dept: RADIOLOGY | Facility: HOSPITAL | Age: 39
End: 2024-08-07
Payer: COMMERCIAL

## 2024-08-07 ENCOUNTER — APPOINTMENT (OUTPATIENT)
Dept: DIALYSIS | Facility: HOSPITAL | Age: 39
End: 2024-08-07
Payer: COMMERCIAL

## 2024-08-07 LAB
ABO GROUP (TYPE) IN BLOOD: NORMAL
ALBUMIN SERPL BCP-MCNC: 2.5 G/DL (ref 3.4–5)
ALP SERPL-CCNC: 211 U/L (ref 33–120)
ALT SERPL W P-5'-P-CCNC: 13 U/L (ref 10–52)
ANION GAP SERPL CALC-SCNC: 16 MMOL/L (ref 10–20)
ANTIBODY SCREEN: NORMAL
AST SERPL W P-5'-P-CCNC: 13 U/L (ref 9–39)
BASOPHILS # BLD AUTO: 0.09 X10*3/UL (ref 0–0.1)
BASOPHILS NFR BLD AUTO: 0.6 %
BILIRUB SERPL-MCNC: 0.7 MG/DL (ref 0–1.2)
BUN SERPL-MCNC: 36 MG/DL (ref 6–23)
CALCIUM SERPL-MCNC: 9.1 MG/DL (ref 8.6–10.6)
CHLORIDE SERPL-SCNC: 94 MMOL/L (ref 98–107)
CO2 SERPL-SCNC: 26 MMOL/L (ref 21–32)
CREAT SERPL-MCNC: 5.01 MG/DL (ref 0.5–1.3)
EGFRCR SERPLBLD CKD-EPI 2021: 14 ML/MIN/1.73M*2
EOSINOPHIL # BLD AUTO: 0.01 X10*3/UL (ref 0–0.7)
EOSINOPHIL NFR BLD AUTO: 0.1 %
ERYTHROCYTE [DISTWIDTH] IN BLOOD BY AUTOMATED COUNT: 21.8 % (ref 11.5–14.5)
GLUCOSE BLD MANUAL STRIP-MCNC: 104 MG/DL (ref 74–99)
GLUCOSE BLD MANUAL STRIP-MCNC: 106 MG/DL (ref 74–99)
GLUCOSE BLD MANUAL STRIP-MCNC: 133 MG/DL (ref 74–99)
GLUCOSE BLD MANUAL STRIP-MCNC: 135 MG/DL (ref 74–99)
GLUCOSE BLD MANUAL STRIP-MCNC: 138 MG/DL (ref 74–99)
GLUCOSE SERPL-MCNC: 150 MG/DL (ref 74–99)
HCT VFR BLD AUTO: 26.5 % (ref 41–52)
HGB BLD-MCNC: 8.2 G/DL (ref 13.5–17.5)
HYPOCHROMIA BLD QL SMEAR: NORMAL
IMM GRANULOCYTES # BLD AUTO: 0.06 X10*3/UL (ref 0–0.7)
IMM GRANULOCYTES NFR BLD AUTO: 0.4 % (ref 0–0.9)
INTERPRETATION FOR ANTI-PLATELET FACTOR 4 ANTIBODY: NEGATIVE
LYMPHOCYTES # BLD AUTO: 0.89 X10*3/UL (ref 1.2–4.8)
LYMPHOCYTES NFR BLD AUTO: 5.8 %
MAGNESIUM SERPL-MCNC: 1.97 MG/DL (ref 1.6–2.4)
MCH RBC QN AUTO: 29.8 PG (ref 26–34)
MCHC RBC AUTO-ENTMCNC: 30.9 G/DL (ref 32–36)
MCV RBC AUTO: 96 FL (ref 80–100)
MONOCYTES # BLD AUTO: 0.81 X10*3/UL (ref 0.1–1)
MONOCYTES NFR BLD AUTO: 5.2 %
NEUTROPHILS # BLD AUTO: 13.61 X10*3/UL (ref 1.2–7.7)
NEUTROPHILS NFR BLD AUTO: 87.9 %
NRBC BLD-RTO: 0 /100 WBCS (ref 0–0)
PHOSPHATE SERPL-MCNC: 5.2 MG/DL (ref 2.5–4.9)
PLATELET # BLD AUTO: 64 X10*3/UL (ref 150–450)
POTASSIUM SERPL-SCNC: 4.9 MMOL/L (ref 3.5–5.3)
PROT SERPL-MCNC: 6.1 G/DL (ref 6.4–8.2)
RBC # BLD AUTO: 2.75 X10*6/UL (ref 4.5–5.9)
RBC MORPH BLD: NORMAL
RH FACTOR (ANTIGEN D): NORMAL
SERUM AND PLATELET FACTOR 4: 0.13 OD UNITS
SODIUM SERPL-SCNC: 131 MMOL/L (ref 136–145)
UFH PPP CHRO-ACNC: 0.4 IU/ML
VANCOMYCIN SERPL-MCNC: 18 UG/ML (ref 5–20)
WBC # BLD AUTO: 15.5 X10*3/UL (ref 4.4–11.3)

## 2024-08-07 PROCEDURE — 80202 ASSAY OF VANCOMYCIN: CPT | Performed by: INTERNAL MEDICINE

## 2024-08-07 PROCEDURE — 2500000004 HC RX 250 GENERAL PHARMACY W/ HCPCS (ALT 636 FOR OP/ED)

## 2024-08-07 PROCEDURE — 83735 ASSAY OF MAGNESIUM: CPT

## 2024-08-07 PROCEDURE — 36415 COLL VENOUS BLD VENIPUNCTURE: CPT

## 2024-08-07 PROCEDURE — 86901 BLOOD TYPING SEROLOGIC RH(D): CPT

## 2024-08-07 PROCEDURE — 99223 1ST HOSP IP/OBS HIGH 75: CPT | Performed by: STUDENT IN AN ORGANIZED HEALTH CARE EDUCATION/TRAINING PROGRAM

## 2024-08-07 PROCEDURE — 74018 RADEX ABDOMEN 1 VIEW: CPT

## 2024-08-07 PROCEDURE — 2500000001 HC RX 250 WO HCPCS SELF ADMINISTERED DRUGS (ALT 637 FOR MEDICARE OP)

## 2024-08-07 PROCEDURE — 99291 CRITICAL CARE FIRST HOUR: CPT | Performed by: NURSE PRACTITIONER

## 2024-08-07 PROCEDURE — 2060000001 HC INTERMEDIATE ICU ROOM DAILY

## 2024-08-07 PROCEDURE — 84100 ASSAY OF PHOSPHORUS: CPT

## 2024-08-07 PROCEDURE — 74018 RADEX ABDOMEN 1 VIEW: CPT | Performed by: RADIOLOGY

## 2024-08-07 PROCEDURE — 80053 COMPREHEN METABOLIC PANEL: CPT

## 2024-08-07 PROCEDURE — 85025 COMPLETE CBC W/AUTO DIFF WBC: CPT

## 2024-08-07 PROCEDURE — 85520 HEPARIN ASSAY: CPT

## 2024-08-07 PROCEDURE — 8010000001 HC DIALYSIS - HEMODIALYSIS PER DAY

## 2024-08-07 PROCEDURE — 99232 SBSQ HOSP IP/OBS MODERATE 35: CPT | Performed by: INTERNAL MEDICINE

## 2024-08-07 PROCEDURE — 82947 ASSAY GLUCOSE BLOOD QUANT: CPT

## 2024-08-07 PROCEDURE — 2500000001 HC RX 250 WO HCPCS SELF ADMINISTERED DRUGS (ALT 637 FOR MEDICARE OP): Performed by: NURSE PRACTITIONER

## 2024-08-07 RX ORDER — POLYETHYLENE GLYCOL 3350 17 G/17G
17 POWDER, FOR SOLUTION ORAL DAILY
Status: DISCONTINUED | OUTPATIENT
Start: 2024-08-07 | End: 2024-08-07

## 2024-08-07 RX ORDER — ONDANSETRON HYDROCHLORIDE 2 MG/ML
4 INJECTION, SOLUTION INTRAVENOUS EVERY 6 HOURS PRN
Status: DISCONTINUED | OUTPATIENT
Start: 2024-08-07 | End: 2024-08-22 | Stop reason: HOSPADM

## 2024-08-07 RX ORDER — SENNOSIDES 8.6 MG/1
1 TABLET ORAL 2 TIMES DAILY
Status: DISCONTINUED | OUTPATIENT
Start: 2024-08-07 | End: 2024-08-07

## 2024-08-07 RX ORDER — HEPARIN SODIUM 10000 [USP'U]/100ML
0-4500 INJECTION, SOLUTION INTRAVENOUS CONTINUOUS
Status: CANCELLED | OUTPATIENT
Start: 2024-08-07

## 2024-08-07 RX ORDER — POLYETHYLENE GLYCOL 3350 17 G/17G
17 POWDER, FOR SOLUTION ORAL 2 TIMES DAILY
Status: DISCONTINUED | OUTPATIENT
Start: 2024-08-07 | End: 2024-08-22 | Stop reason: HOSPADM

## 2024-08-07 RX ORDER — BISACODYL 10 MG/1
10 SUPPOSITORY RECTAL DAILY
Status: DISCONTINUED | OUTPATIENT
Start: 2024-08-07 | End: 2024-08-20

## 2024-08-07 RX ORDER — AMOXICILLIN 250 MG
1 CAPSULE ORAL 2 TIMES DAILY
Status: DISCONTINUED | OUTPATIENT
Start: 2024-08-07 | End: 2024-08-22 | Stop reason: HOSPADM

## 2024-08-07 ASSESSMENT — PAIN - FUNCTIONAL ASSESSMENT
PAIN_FUNCTIONAL_ASSESSMENT: NO/DENIES PAIN
PAIN_FUNCTIONAL_ASSESSMENT: 0-10

## 2024-08-07 ASSESSMENT — PAIN SCALES - GENERAL
PAINLEVEL_OUTOF10: 4
PAINLEVEL_OUTOF10: 0 - NO PAIN
PAINLEVEL_OUTOF10: 2
PAINLEVEL_OUTOF10: 8
PAINLEVEL_OUTOF10: 7
PAINLEVEL_OUTOF10: 0 - NO PAIN

## 2024-08-07 ASSESSMENT — PAIN DESCRIPTION - LOCATION: LOCATION: ARM

## 2024-08-07 NOTE — SIGNIFICANT EVENT
Floor Readiness Note       I, personally, evaluated Edwar Hemphill prior to transfer to the floor, including reviewing all current laboratory and imaging studies. The patient remains appropriate for transfer to the floor. Bedside nurse and respiratory therapy are also in agreement of patient's readiness for the floor.     Brief summary:  Edwar Hemphill is a 39 y.o. male who was admitted to the MICU on 7/22 for septic shock 2/2 infective endocarditis. They have been treated with IV antibiotics, vasopressors, HD.     Updated focused Physical Exam:  Neuro:  A/Ox4  CV:  RRR  PULM:  Diminished breath soiunds  MSK:  LUE edema with kerlix wrap.  L AKA, RUE amputation  GI:  NO hard stool in rectal vault, bowel sounds present    Current Vital Signs:  Heart Rate: 85 (08/07/24 1400 : Hosea Agrawal RN)  BP: 99/64 (08/07/24 1400 : Hosea Agrawal, [facility])  Temp: 35.7 °C (96.3 °F) (08/07/24 1600 : Renee Moss)  Resp: 13 (08/07/24 1400 : Hosea Agrawal, [facility])  SpO2: 95 % (08/07/24 1400 : Hosea Agrawal, [facility])    Relevant updates since rounds:  Emesis x2, no stool impaction  Suppository given    Accepting team,  SDU , received verbal sign out and the Provider Care team/Attending has been updated. Bedside nurse will now call accepting nurse for report and patient will be transferred to [place] [id number].    AMARILYS Kiser-CNP

## 2024-08-07 NOTE — PROGRESS NOTES
Physical Therapy                 Therapy Communication Note    Patient Name: Edwar Hemphill  MRN: [id number]  Today's Date: 8/7/2024     Discipline: Physical Therapy    Missed Visit Reason: Missed Visit Reason: Patient placed on medical hold (patient with multiple bouts of emesis overnight; per RN, patient with emesis this AM as well. PT will hold at this time and re-attempt as time and schedule allows and as medically appropriate.)    Missed Time: Attempt    Comment:

## 2024-08-07 NOTE — PROGRESS NOTES
Subjective     Interval History: Edwar Hemphill has no new complaints    Medications    Current Facility-Administered Medications:     acetaminophen (Tylenol) tablet 650 mg, 650 mg, oral, q4h PRN, 650 mg at 08/02/24 2059 **OR** [DISCONTINUED] acetaminophen (Tylenol) oral liquid 650 mg, 650 mg, nasogastric tube, q4h PRN **OR** [DISCONTINUED] acetaminophen (Tylenol) suppository 650 mg, 650 mg, rectal, q4h PRN, Blaine Gonzalez MD    atorvastatin (Lipitor) tablet 40 mg, 40 mg, oral, Nightly, Blaine Gonzalez MD, 40 mg at 08/06/24 2047    B complex-vitamin C-folic acid (Nephrocaps) capsule 1 capsule, 1 capsule, oral, Daily, Blaine Gonzalez MD, 1 capsule at 08/06/24 0824    bisacodyl (Dulcolax) suppository 10 mg, 10 mg, rectal, Daily, AMARILYS Kiser-MARGUERITE    cholecalciferol (Vitamin D-3) capsule 50,000 Units, 50,000 Units, oral, Once per day on Monday Thursday, Blaine Gonzalez MD, 50,000 Units at 08/05/24 0850    dextrose 50 % injection 12.5 g, 12.5 g, intravenous, q15 min PRN, Blaine Gonzalez MD, 12.5 g at 08/02/24 1605    dextrose 50 % injection 25 g, 25 g, intravenous, q15 min PRN, Blaine Gonzalez MD, 25 g at 07/31/24 1730    diphenhydrAMINE (BENADryl) capsule 25 mg, 25 mg, oral, q6h PRN, Blaine Gonzalez MD, 25 mg at 07/24/24 1525    epoetin tyson (Epogen,Procrit) injection 10,000 Units, 10,000 Units, subcutaneous, Once per day on Monday Wednesday Friday, Blaine Gonzalez MD, 10,000 Units at 08/05/24 0850    folic acid (Folvite) tablet 1 mg, 1 mg, oral, Daily, Blaine Gonzalez MD, 1 mg at 08/06/24 0823    glucagon (Glucagen) injection 1 mg, 1 mg, intramuscular, q15 min PRN, Blaine Gonzalez MD    glucagon (Glucagen) injection 1 mg, 1 mg, intramuscular, q15 min PRN, Blaine Gonzalez MD    heparin 1,000 unit/mL injection 1,000 Units, 1,000 Units, intra-catheter, PRN, Blaine Gonzalez MD    heparin 1,000 unit/mL injection 1,000 Units, 1,000 Units, intra-catheter, PRN, Blaine Gonzalez MD, 1,000 Units at 07/26/24 1400    heparin 25,000  Units in dextrose 5% 250 mL (100 Units/mL) infusion (premix), 0-4,500 Units/hr, intravenous, Continuous, Heydi Quiñones MD, Last Rate: 17 mL/hr at 08/07/24 1413, 1,700 Units/hr at 08/07/24 1413    HYDROmorphone (Dilaudid) injection 0.1 mg, 0.1 mg, intravenous, q3h PRN, AMARILYS Barrientos-CNP, 0.1 mg at 08/07/24 1627    insulin glargine (Lantus) injection 8 Units, 8 Units, subcutaneous, Nightly, Lupe Lopez, AMARILYS-CNP, 8 Units at 08/05/24 2101    insulin lispro (HumaLOG) injection 0-15 Units, 0-15 Units, subcutaneous, TID, Meena Huizar MD    levothyroxine (Synthroid, Levoxyl) tablet 150 mcg, 150 mcg, oral, Daily before breakfast, Heydi Quiñones MD, 150 mcg at 08/06/24 0824    lidocaine (LMX) 4 % cream, , Topical, 4x daily PRN, Blaine Gonzalez MD, Given at 07/24/24 0828    melatonin tablet 6 mg, 6 mg, oral, Nightly, Blaine Gonzalez MD, 6 mg at 08/06/24 2047    midodrine (Proamatine) tablet 15 mg, 15 mg, oral, q8h, Gerardo Foreman MD, 15 mg at 08/07/24 1419    nystatin (Mycostatin) 100,000 unit/gram powder 1 Application, 1 Application, Topical, BID, Blaine Gonzalez MD, 1 Application at 08/06/24 2047    ondansetron (Zofran) injection 4 mg, 4 mg, intravenous, q6h PRN, Gerardo Foreman MD, 4 mg at 08/07/24 0838    oxyCODONE (Roxicodone) immediate release tablet 5 mg, 5 mg, oral, q4h PRN, Kobi Jimenez, AMARILYS-CNP, 5 mg at 08/07/24 1238    pantoprazole (ProtoNix) EC tablet 40 mg, 40 mg, oral, Daily before breakfast, Gerardo Foreman MD, 40 mg at 08/07/24 0501    polyethylene glycol (Glycolax, Miralax) packet 17 g, 17 g, oral, Daily, OSBALDO Kiser    sennosides-docusate sodium (Aracelis-Colace) 8.6-50 mg per tablet 1 tablet, 1 tablet, oral, BID, OSBALDO Kiser    thiamine (Vitamin B-1) tablet 200 mg, 200 mg, oral, Daily, Blaine Gonzalez MD, 200 mg at 08/06/24 0823    vancomycin (Vancocin) pharmacy to dose - pharmacy monitoring, , miscellaneous, Daily PRN, Blaine Gonzalez,  MD    Objective     Physical Exam  Heart S1 S2 RRR, Lungs CTA, no edema  Is now off pressors    Vital signs in last 24 hours:  Temp:  [35.7 °C (96.3 °F)-36.5 °C (97.7 °F)] 35.7 °C (96.3 °F)  Heart Rate:  [] 90  Resp:  [11-25] 15  BP: ()/(60-98) 95/66       Intake/Output last 3 shifts:  I/O last 3 completed shifts:  In: 932.5 (13.2 mL/kg) [I.V.:882.5 (12.4 mL/kg); IV Piggyback:50]  Out: - (0 mL/kg)   Dosing Weight: 70.9 kg     Labs:  Results from last 7 days   Lab Units 08/07/24  0437   WBC AUTO x10*3/uL 15.5*   RBC AUTO x10*6/uL 2.75*   HEMOGLOBIN g/dL 8.2*   HEMATOCRIT % 26.5*     Results from last 7 days   Lab Units 08/07/24  0437   SODIUM mmol/L 131*   POTASSIUM mmol/L 4.9   CHLORIDE mmol/L 94*   CO2 mmol/L 26   BUN mg/dL 36*   CREATININE mg/dL 5.01*   CALCIUM mg/dL 9.1   PHOSPHORUS mg/dL 5.2*   MAGNESIUM mg/dL 1.97   BILIRUBIN TOTAL mg/dL 0.7   ALT U/L 13   AST U/L 13       Assessment/Plan     Principal Problem:    Septic shock (Multi)  Active Problems:    Acute infective endocarditis (HHS-HCC)    Tricuspid valve vegetation (HHS-HCC)    Staphylococcal arthritis of left wrist (Multi)    Infection of left wrist (Multi)    ESRD oatient s/p septic shock, hemodynamic status improving, had conventional HD today, tolerated well.  Will plan to continue regular HD as tolerated by hemodynamic status, plan next HD 8/9.    Karishma Mcmanus MD  8/7/2024  4:40 PM

## 2024-08-07 NOTE — PROGRESS NOTES
Edwar Hemphill is a 39 y.o. male on day 16 of admission presenting with Septic shock (Multi).    Subjective   Interval History:   -Left wrist pain better today  -Denies fevers, chills, sweats, rash, diarrhea  -Hearing overall better stable  -Notes he needs his contacts  -Reviewed course and difficult situation with respect to MRSA infection given clot and retained line    Review of Systems    Objective   Range of Vitals (last 24 hours)  Heart Rate:  [73-94]   Temp:  [35.1 °C (95.2 °F)-36.3 °C (97.3 °F)]   Resp:  [9-17]   BP: ()/(66-85)   SpO2:  [91 %-100 %]   Daily Weight  07/25/24 : 75.4 kg (166 lb 3.6 oz)    Body mass index is 24.54 kg/m².    Physical Exam  CONSTITUTIONAL: Chronically ill appearing, NAD, cooperative  EYES: PERRLA, EOMI, Sclera anicteric.  No conjunctival injection.  No petechial or embolic lesions  ENMT: MMM, No oral lesions or thrush.   CVS: RRR, S1 & S2 normal.  RESPIRATORY/CHEST: Clear anteriorly.  No rales or rhonchi  GI: Soft, NT/ND.  No rebound or guarding  EXT: Left wrist wrapped in post-surgical bandage. Right LE with edema and scaling skin. R fem line without surrounding erythema/induration/drainage. S/p RUE above elbow amputation, LLE AKA; sites appear c/d/i    Antibiotics  vancomycin    Relevant Results  Labs  Results from last 72 hours   Lab Units 08/06/24  0610 08/05/24  0402 08/04/24  0448   WBC AUTO x10*3/uL 8.5 8.9 11.1   HEMOGLOBIN g/dL 7.9* 7.3* 7.8*   HEMATOCRIT % 23.9* 21.5* 24.9*   PLATELETS AUTO x10*3/uL 48* 42* 55*   NEUTROS PCT AUTO % 72.8 75.0 72.2   LYMPHS PCT AUTO % 16.1 14.5 17.3   MONOS PCT AUTO % 7.9 7.7 8.5   EOS PCT AUTO % 1.2 0.8 0.5     Results from last 72 hours   Lab Units 08/06/24  0610 08/05/24  0402 08/04/24  0448   SODIUM mmol/L 131* 130* 132*   POTASSIUM mmol/L 4.6 4.4 3.8   CHLORIDE mmol/L 96* 96* 97*   CO2 mmol/L 25 25 25   BUN mg/dL 31* 25* 18   CREATININE mg/dL 4.93* 4.21* 3.33*   GLUCOSE mg/dL 151* 392* 142*   CALCIUM mg/dL 9.0 8.3* 8.5*   ANION  GAP mmol/L 15 13 14   EGFR mL/min/1.73m*2 14* 17* 23*   PHOSPHORUS mg/dL 5.3* 4.8 4.4     Results from last 72 hours   Lab Units 08/06/24  0610 08/05/24  0402 08/04/24  0448   ALK PHOS U/L 210* 211* 212*   BILIRUBIN TOTAL mg/dL 0.6 0.6 0.7   PROTEIN TOTAL g/dL 5.7* 5.4* 5.5*   ALT U/L 15 13 13   AST U/L 16 17 13   ALBUMIN g/dL 2.5* 2.4* 2.5*     Estimated Creatinine Clearance: 19.9 mL/min (A) (by C-G formula based on SCr of 4.93 mg/dL (H)).  C-Reactive Protein   Date Value Ref Range Status   07/27/2024 6.44 (H) <1.00 mg/dL Final     CRP   Date Value Ref Range Status   07/24/2023 8.04 (A) mg/dL Final     Comment:     REF VALUE  < 1.00     07/24/2023 CANCELED       Comment:     Result canceled by the ancillary.     Microbiology  Susceptibility data from last 14 days.  Collected Specimen Info Organism Clindamycin Erythromycin Oxacillin Tetracycline Trimethoprim/Sulfamethoxazole Vancomycin   07/27/24 Fluid from Synovial Methicillin Resistant Staphylococcus aureus (MRSA)  R  R  R  R  S  S   07/27/24 Blood culture from Peripheral Venipuncture Staphylococcus haemolyticus  R  R  R  S  R  S   07/25/24 Blood culture from Peripheral Venipuncture Staphylococcus aureus         07/25/24 Blood culture from Peripheral Venipuncture Staphylococcus aureus         -07/22 Bcx: MRSA in 2/2, S bactrim and vanc  -07/25 Bcx: MRSA in 2/2  -07/27 Bcx: Staph haemolyticus (1/1) (likely contaminant)  -07/27 OR culture (L wrist): MRSA  -07/29 Bcx: NG  -07/31 Bcx: NG    Imaging  Reviewed in Epic.   TTE 7/23 with thickened TV and mobile densities associated with the TV with severe TR, not seen previously (TTE 5/14/24 with trivial TR and normal TV), suspicious for endocarditis.  Currently receiving Vancomycin and Ceftaroline for the Methicillin-resistant Staphylococcus aureus.  POLI 7/24 with EF 40 and global hypokinesis, large vegetation encasing TDC with involvement and destruction of septal leaflet of TV and severe TR. Small mobile echodensity on  aortic valve, concerning for vegetation vs degenerative change. Moderate patent foramen ovale.     CT 7/25  IMPRESSION:  CHEST:  1. Interval development of bilateral pulmonary nodular opacities  several of them showing cavitation. Findings are concerning for  septic embolism/cavitary pneumonia.  2. Bilateral pleural effusions.  3. Right femoral central venous catheter with tip terminating in the  right ventricle. Hypodense thrombus around the catheter in the right  atrium correlating with the vegetation seen on prior echocardiogram.  4. Severely attenuated superior vena cava, bilateral brachiocephalic,  subclavian and internal jugular veins with the calcifications in the  brachiocephalic veins with extensive collaterals in the chest wall  likely representing chronic thrombosis.  ABDOMEN-PELVIS:  1. Severely attenuated inferior vena cava with multiple  calcifications, consistent with chronic IVC thrombosis.  2. Interval development of a T10 vertebral body fracture.  Re-demonstration of severe osseous changes throughout the  thoracolumbar spine. Findings are likely secondary to renal  osteodystrophy.  3. Diffuse thickening of the colon and rectum with the pericolonic  stranding consistent with the proctocolitis which can be  inflammatory/infectious in etiology. Correlate with clinical symptoms.  4. Diffuse mesenteric haziness and anasarca likely representing fluid  overload.  5. Bilateral atrophied kidneys with hypoenhancement, representative  of medical renal disease.    Assessment/Plan   39 y.o. male with ESRD on HD (MWF, right femoral line), HFrEF (EF 35-40% 5/2024), T2DM, PAD s/p L AKA and R above elbow amputation with multiple hospitalizations for L AKA infection and MRSA bacteremia who presented from SNF on 7/22 with hypotension.     MRSA bacteremia with TV endocarditis (with possible AV lesion) in setting of HD catheter infection with associated thrombus, extensive chronic IVC clot, and L wrist septic arthritis  "s/p washout 7/29. Cultures cleared 7/29. Vegectomy not pursued ultimately due to comorbidities. CT surgery previously evaluated as well, not planned for surgical intervention for vegetation nor clot. Unclear if component of prior extensive clot might be involved at this point given his extensive MRSA bacteremia history. iHD line has also been declined to be exchanged due to access limitations/availability of providers. Will do what we can with antibiotics.    Recommendations:  -Stop ceftaroline  -Will discuss ALT therapy with Pharmacy if possible as his HD line access needs reduce  -Continue vancomycin post-HD through end date 9/13  -Reviewed side effects to monitor for while on vancomycin  -Will trial indefinite suppression with Bactrim 1SS daily after vancomycin course  -Reviewed risks of such an approach with him and his mother today  -Follow-up with me 9/10 scheduled to discuss PO suppression trial    Will follow-up tomorrow and see if can work out ALT therapy with PharmD colleagues, but otherwise plan to sign off. Please contact Team B via [employer] (me) or via pager [phone] with questions.    Doroteo Graham MD  _________________________  ADDENDUM 8/7:  Spoke with Pharmacy regarding ALT therapy. Given need for use of catheter for other indications due to limited access, ALT currently not an option. If get to a point where iHD catheter usage is only for dialysis, can start ALT at that time (Epic orderset \"[employer] Orderset\", the vancomycin option likely with heparin given clot there). Reviewed with patient/mother previously but prognosis may be limited from access perspective and/or if MRSA breaks through.  "

## 2024-08-07 NOTE — CARE PLAN
Problem: Pain - Adult  Goal: Verbalizes/displays adequate comfort level or baseline comfort level  Outcome: Progressing  Flowsheets (Taken 8/5/2024 1717 by Arianne Eaton RN)  Verbalizes/displays adequate comfort level or baseline comfort level:   Encourage patient to monitor pain and request assistance   Assess pain using appropriate pain scale   Notify Licensed Independent Practitioner if interventions unsuccessful or patient reports new pain   Administer analgesics based on type and severity of pain and evaluate response     Problem: Skin  Goal: Decreased wound size/increased tissue granulation at next dressing change  Outcome: Progressing  Flowsheets (Taken 8/6/2024 1002 by Linda Georges, RN)  Decreased wound size/increased tissue granulation at next dressing change: Protective dressings over bony prominences  Goal: Participates in plan/prevention/treatment measures  Outcome: Progressing  Flowsheets (Taken 8/6/2024 1002 by Linda Georges RN)  Participates in plan/prevention/treatment measures: Discuss with provider PT/OT consult  Goal: Prevent/manage excess moisture  Outcome: Progressing  Flowsheets (Taken 8/6/2024 2257)  Prevent/manage excess moisture:   Cleanse incontinence/protect with barrier cream   Monitor for/manage infection if present   Moisturize dry skin  Goal: Prevent/minimize sheer/friction injuries  Outcome: Progressing  Flowsheets (Taken 8/4/2024 2314 by Pita Brumfield RN)  Prevent/minimize sheer/friction injuries:   Complete micro-shifts as needed if patient unable. Adjust patient position to relieve pressure points, not a full turn   Use pull sheet   HOB 30 degrees or less   Turn/reposition every 2 hours/use positioning/transfer devices   Utilize specialty bed per algorithm  Goal: Promote/optimize nutrition  Outcome: Progressing  Flowsheets (Taken 8/4/2024 2314 by Pita Brumfield, RN)  Promote/optimize nutrition:   Monitor/record intake including meals   Consume > 50% meals/supplements    Offer water/supplements/favorite foods  Goal: Promote skin healing  Outcome: Progressing  Flowsheets (Taken 8/4/2024 2314 by Pita Brumfield RN)  Promote skin healing:   Assess skin/pad under line(s)/device(s)   Protective dressings over bony prominences   Turn/reposition every 2 hours/use positioning/transfer devices   Ensure correct size (line/device) and apply per  instructions   Rotate device position/do not position patient on device     Problem: Diabetes  Goal: Maintain electrolyte levels within acceptable range throughout shift  Outcome: Progressing  Goal: Maintain glucose levels >70mg/dl to <250mg/dl throughout shift  Outcome: Progressing  Goal: No changes in neurological exam by end of shift  Outcome: Progressing  Goal: Learn about and adhere to nutrition recommendations by end of shift  Outcome: Progressing  Goal: Vital signs within normal range for age by end of shift  Outcome: Progressing  Goal: Increase self care and/or family involovement by end of shift  Outcome: Progressing  Goal: Receive DSME education by end of shift  Outcome: Progressing     Problem: Safety - Adult  Goal: Free from fall injury  Outcome: Progressing

## 2024-08-07 NOTE — PROGRESS NOTES
"Medical Intensive Care - Daily Progress Note   Subjective    Edwar Hemphill is a 39 y.o. year old male patient admitted on 7/22/2024 with following ICU needs: hypotension requiring vasopressors    Interval History:  Off vasopressors  5 episodes of brown emesis overnight    Meds    Scheduled medications  atorvastatin, 40 mg, oral, Nightly  B complex-vitamin C-folic acid, 1 capsule, oral, Daily  bisacodyl, 10 mg, rectal, Daily  cholecalciferol, 50,000 Units, oral, Once per day on Monday Thursday  epoetin tyson or biosimilar, 10,000 Units, subcutaneous, Once per day on Monday Wednesday Friday  folic acid, 1 mg, oral, Daily  insulin glargine, 8 Units, subcutaneous, Nightly  insulin lispro, 0-15 Units, subcutaneous, TID  levothyroxine, 150 mcg, oral, Daily before breakfast  melatonin, 6 mg, oral, Nightly  midodrine, 15 mg, oral, q8h  nystatin, 1 Application, Topical, BID  pantoprazole, 40 mg, oral, Daily before breakfast  polyethylene glycol, 17 g, oral, Daily  sennosides-docusate sodium, 1 tablet, oral, BID  thiamine, 200 mg, oral, Daily      Continuous medications  heparin, 0-4,500 Units/hr, Last Rate: 1,700 Units/hr (08/07/24 0800)      PRN medications  PRN medications: acetaminophen **OR** [DISCONTINUED] acetaminophen **OR** [DISCONTINUED] acetaminophen, dextrose, dextrose, diphenhydrAMINE, glucagon, glucagon, heparin, heparin, HYDROmorphone, lidocaine, ondansetron, oxyCODONE, vancomycin     Objective    Blood pressure 103/65, pulse 87, temperature 36.1 °C (97 °F), temperature source Temporal, resp. rate 12, height 1.753 m (5' 9.02\"), weight 82.4 kg (181 lb 10.5 oz), SpO2 95%.     Physical Exam  Constitutional:       Appearance: He is ill-appearing.   HENT:      Mouth/Throat:      Mouth: Mucous membranes are moist.   Eyes:      Conjunctiva/sclera: Conjunctivae normal.   Cardiovascular:      Rate and Rhythm: Normal rate and regular rhythm.      Heart sounds: Normal heart sounds.   Pulmonary:      Effort: Pulmonary effort " is normal.      Breath sounds: Normal breath sounds.   Abdominal:      General: Abdomen is flat. Bowel sounds are normal.   Musculoskeletal:      Cervical back: Normal range of motion.      Comments: RUE amputation, LUE swelling with ACE wrap      Left Lower Extremity: Left leg is amputated above knee.   Skin:     General: Skin is warm and dry.   Psychiatric:         Mood and Affect: Affect is flat.            Intake/Output Summary (Last 24 hours) at 8/7/2024 1102  Last data filed at 8/7/2024 0900  Gross per 24 hour   Intake 434 ml   Output --   Net 434 ml     Labs:   Results from last 72 hours   Lab Units 08/07/24  0437 08/06/24  0610 08/05/24  0402   SODIUM mmol/L 131* 131* 130*   POTASSIUM mmol/L 4.9 4.6 4.4   CHLORIDE mmol/L 94* 96* 96*   CO2 mmol/L 26 25 25   BUN mg/dL 36* 31* 25*   CREATININE mg/dL 5.01* 4.93* 4.21*   GLUCOSE mg/dL 150* 151* 392*   CALCIUM mg/dL 9.1 9.0 8.3*   ANION GAP mmol/L 16 15 13   EGFR mL/min/1.73m*2 14* 14* 17*   PHOSPHORUS mg/dL 5.2* 5.3* 4.8      Results from last 72 hours   Lab Units 08/07/24  0437 08/06/24  0610 08/05/24  0402   WBC AUTO x10*3/uL 15.5* 8.5 8.9   HEMOGLOBIN g/dL 8.2* 7.9* 7.3*   HEMATOCRIT % 26.5* 23.9* 21.5*   PLATELETS AUTO x10*3/uL 64* 48* 42*   NEUTROS PCT AUTO % 87.9 72.8 75.0   LYMPHS PCT AUTO % 5.8 16.1 14.5   MONOS PCT AUTO % 5.2 7.9 7.7   EOS PCT AUTO % 0.1 1.2 0.8        Micro/ID:     Lab Results   Component Value Date    BLOODCULT No growth at 4 days -  FINAL REPORT 07/31/2024    BLOODCULT No growth at 4 days -  FINAL REPORT 07/31/2024       Summary of key imaging results from the last 24 hours  KUB 8/7, moderate stool in colon    Assessment and Plan     Assessment: Edwar Hemphill is a 39 y.o. year old male patient admitted on 7/22/2024 with septic shock 2/2 infective endocarditis.    Mechanical Ventilation: none  Sedation/Analgesia:  none  Restraints: no    Summary for 08/07/24  :  Decrease midodrine  HD in MICU  Transfer to  SDU    Plan:  NEUROLOGY/PSYCH:  Dx:  Flat affect  Has left AMA in past  Management:  Pain management with tylenol and oxy    CARDIOVASCULAR:  Dx:  Infective endocarditis with TV vegetation, Severely attenuated superior vena cava, bilateral brachiocephalic, subclavian and internal jugular veins with the calcifications in the brachiocephalic veins with extensive collaterals in the chest wall likely representing chronic thrombosis. Patient had vegectomy aborted due to to concerns for infected thrombus in the IVC.  Persistent hypotension.  Off vasopressors   Management:  Map >65  Midodrine 15mg every 8 hours    PULMONARY:  Dx:  NAVI  Management:  NA    RENAL/GENITOURINARY:  Dx:  ERSD on MWF HD via R fem HD line (IR placed)  Management:  IHD per Nephro  Hold of on IR replacement of HD line (recent cultures clear)    GASTROENTEROLOGY:  Dx:  Esophagitis, GERD, Recent GIB  Emesis with moderate stool on KUB  Management:  PPI  Bowel regimen  PRN Zofran    ENDOCRINOLOGY:  Dx: DM 1   - glargine 8 units   - sliding scale insulin    HEMATOLOGY:  Dx:  Anemia of chronic disease, thrombocytopenia  No concern for KALLIE at this time  Management:  Heparin gtt  - via HD line  Daily CBC  Vascular medicine consulted, need GI clearance for home going anticoag    MUSCULOSKELETAL/ SKIN:  Dx:  Septic arthritis of Left wrist  Culture growing MRSA  Management:  Vanco/ceftaroline    INFECTIOUS DISEASE:  Dx:  MRSA, Staph haemolyticus bacteremia.  Hx of Acinetobacter  Culture from 7/29 negative, Culture from 7/31 negative  Management:  ID following  Vanco    ICU Check List       ICU Liberation: Intervention:   Assess, Prevent, Manage Pain CPOT - NA   Both SAT and SBT [] SAT  [] SBT 30-60 min [] Extubate to NC  [] Extubate to NIV  [] Discuss Trach   Choice of analgesia and sedation RASS: 0  none NA   Delirium: Assess, prevent and manage CAM - NA   Early Mobility and Exercise  [x] PT /OT consult   Family Engagement and Empowerment []Family updated  [x]SW consult     FEN  Fluids: PRN  Electrolytes: HD  Nutrition: Oral diet  Prophylaxis:  DVT ppx: heparin gtt  GI ppx: PPI  Bowel care: NA  Hardware:  Catheter: NA  Access: R fem dialysis line  Drains: NA  Social:  Code: Full Code    HPOA: Shanthi Hemphill - Mobile Phone: [phone]  Secondary Emergency Contact: SoniaTerra  Mobile Phone: [phone]  Disposition: MICU for HD then transfer to SDU.    AMARILYS Kiser-CNP   08/07/24 at 11:02 AM     Disclaimer: Documentation completed with the information available at the time of input. The times in the chart may not be reflective of actual patient care times, interventions, or procedures. Documentation occurs after the physical care of the patient.

## 2024-08-07 NOTE — CONSULTS
[facility]   [facility] [place]  INITIAL CONSULT NOTE     Source of Information: The source of the history was chart review      Consult requested by: Service: MICU    Reason for Consult: Clearance for AC for DVT iso esophageal ulcers requiring endoscopy 6/2024    Admission Chief Complaint: IE      SUBJECTIVE     HPI: Edwar Hemphill is a 39 y.o. male w/PMH of ESRD on HD (MWF), HFrEF (EF 35-40% 5/2024), PAD s/p L AKA and R above elbow amputation presented from SNF on 7/22 with hypotension admitted for septic shock due to MRSA bacteremia with TV endocarditis in the setting of HD catheter infection.  Pt has subclavian DVT, L IJ DVT, b/l innominate occlusion, complete occlusion of intrahepatic IVC s/p angioplasty. Eliquis has held during admission at [facility] in June 2024 where pt was found to have esophageal ulcers requiring epi injection/two clips/purastat.       Since the admission on 7/22, pt required 2 U pRBC on 7/24 and 7/29.    Pt has started on heparin drip from 7/31 and no requirement of transfusion since then. Pt is off NE since 8/4.   Since last night, pt has been having brown emesis Hb 8.2 from 7.9 yesterday. PLT 64.     Of note, pt had an admission at [facility] 6/2024 for GIB.  EGD (6/4/24) showed blood in the middle and lower third of the esophagus with extensive fresh clot, but no bleeding identified. Patient was given octreotide bolus and infusion and repeat EGD done 6/6/2024, which still showed the large clot filling the esophageal lumen. Clot was too large to attempt removal. Patient intubated for airway protection. Patient transferred to [facility], where clot was removed, epinephrine injected and clipped two foci of active esophageal bleeding. Purastat applied and biopsy performed.     ROS: Complete review of systems obtained, negative unless otherwise indicated above.     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 Hypoxia, bilateral lung infiltrates, and peripheral eosinophilia documented in ID note from that date.     Past Medical History:   Diagnosis Date    Chronic kidney disease     Diabetes mellitus (Multi)     ESRD (end stage renal disease) (Multi)     Essential (primary) hypertension 05/26/2017    HTN (hypertension), benign    GERD (gastroesophageal reflux disease)     Hyperlipidemia     Hypothyroidism      Past Surgical History:   Procedure Laterality Date    ARM AMPUTATION AT ELBOW Right 05/2021    AV FISTULA PLACEMENT W/ PTFE      CT AORTA AND BILATERAL ILIOFEMORAL RUNOFF ANGIOGRAM W AND/OR WO IV CONTRAST  02/09/2023    CT AORTA AND BILATERAL ILIOFEMORAL RUNOFF ANGIOGRAM W AND/OR WO IV CONTRAST 2/9/2023 DOCTOR OFFICE LEGACY    INVASIVE VASCULAR PROCEDURE N/A 7/29/2024    Procedure: Venogram;  Surgeon: Grant Dennis MD;  Location: [place] [id number] Cardiac Cath Lab;  Service: Cardiovascular;  Laterality: N/A;    IR CVC EXCHANGE  11/13/2023    IR CVC EXCHANGE 11/13/2023 [facility]    IR CVC PICC  10/19/2023    IR CVC PICC    IR CVC PICC  2/28/2022    IR CVC PICC    LEG AMPUTATION THROUGH KNEE Left 07/08/2023    LEG AMPUTATION THROUGH LOWER TIBIA AND FIBULA Left 07/05/2023    TOE AMPUTATION Left 02/17/2023    left 4th    WOUND DEBRIDEMENT Left 07/26/2023    leg    WOUND DEBRIDEMENT Left 08/02/2023    multiple leg debridements     Family History   Problem Relation Name Age of Onset    Other (DIABETES DUE TO UNDERLYING CONDITION WITH MICROALBUMINURIA) Father      Other (DIABETES DUE TO UNDERLYING CONDITION WITH MICROALBUMINURIA [Other]) Other AUNT     Other (DIABETES DUE TO UNDERLYING CONDITION WITH MICROALBUMINURIA [Other]) Other GP      Social History     Social History Narrative    Not on file     [unfilled]    Medications:  atorvastatin, 40 mg, oral, Nightly  B complex-vitamin C-folic acid, 1 capsule, oral, Daily  cholecalciferol,  50,000 Units, oral, Once per day on Monday Thursday  epoetin tyson or biosimilar, 10,000 Units, subcutaneous, Once per day on Monday Wednesday Friday  folic acid, 1 mg, oral, Daily  HYDROmorphone, 0.4 mg, intravenous, Once  insulin glargine, 8 Units, subcutaneous, Nightly  insulin lispro, 0-15 Units, subcutaneous, TID  insulin regular, 0-10 Units, intravenous, Once  levothyroxine, 150 mcg, oral, Daily before breakfast  melatonin, 6 mg, oral, Nightly  midodrine, 15 mg, oral, q8h  nystatin, 1 Application, Topical, BID  pantoprazole, 40 mg, oral, Daily before breakfast  polyethylene glycol, 17 g, oral, Daily  sennosides-docusate sodium, 1 tablet, oral, BID  thiamine, 200 mg, oral, Daily      PRN medications: acetaminophen **OR** [DISCONTINUED] acetaminophen **OR** [DISCONTINUED] acetaminophen, dextrose, dextrose, diphenhydrAMINE, glucagon, glucagon, heparin, heparin, HYDROmorphone, insulin regular, lidocaine, ondansetron, oxyCODONE, vancomycin      EXAM     Vital signs:      8/7/2024     3:00 PM 8/7/2024     3:47 PM 8/7/2024     4:00 PM 8/7/2024     5:00 PM 8/7/2024     5:10 PM 8/7/2024     6:00 PM 8/7/2024     8:00 PM   Vitals   Systolic 103  95  89 94    Diastolic 64  66  59 58    Heart Rate 85 90 90  88 87    Temp  36.5 °C (97.7 °F) 35.7 °C (96.3 °F) 35.9 °C (96.6 °F)   36.2 °C (97.2 °F)   Resp 14  15  10 10          Physical Exam   General: Chronically ill appearing  Eyes: EOMI, PERRLA  ENT: MMM  Heart: RRR  Lungs: No respiratory distress  Abdomen: Soft, non tender, non distended  Skin: No jaundice   Neuro: Appropriately responds to questions/commands       DATA                                                                            Labs     Results for orders placed or performed during the hospital encounter of 07/22/24 (from the past 24 hour(s))   POCT GLUCOSE   Result Value Ref Range    POCT Glucose 170 (H) 74 - 99 mg/dL   POCT GLUCOSE   Result Value Ref Range    POCT Glucose 122 (H) 74 - 99 mg/dL   Vitamin  B12   Result Value Ref Range    Vitamin B12 1,027 (H) 211 - 911 pg/mL   Folate   Result Value Ref Range    Folate, Serum >24.0 >5.0 ng/mL   POCT GLUCOSE   Result Value Ref Range    POCT Glucose 92 74 - 99 mg/dL   POCT GLUCOSE   Result Value Ref Range    POCT Glucose 141 (H) 74 - 99 mg/dL   POCT GLUCOSE   Result Value Ref Range    POCT Glucose 138 (H) 74 - 99 mg/dL   CBC and Auto Differential   Result Value Ref Range    WBC 15.5 (H) 4.4 - 11.3 x10*3/uL    nRBC 0.0 0.0 - 0.0 /100 WBCs    RBC 2.75 (L) 4.50 - 5.90 x10*6/uL    Hemoglobin 8.2 (L) 13.5 - 17.5 g/dL    Hematocrit 26.5 (L) 41.0 - 52.0 %    MCV 96 80 - 100 fL    MCH 29.8 26.0 - 34.0 pg    MCHC 30.9 (L) 32.0 - 36.0 g/dL    RDW 21.8 (H) 11.5 - 14.5 %    Platelets 64 (L) 150 - 450 x10*3/uL    Neutrophils % 87.9 40.0 - 80.0 %    Immature Granulocytes %, Automated 0.4 0.0 - 0.9 %    Lymphocytes % 5.8 13.0 - 44.0 %    Monocytes % 5.2 2.0 - 10.0 %    Eosinophils % 0.1 0.0 - 6.0 %    Basophils % 0.6 0.0 - 2.0 %    Neutrophils Absolute 13.61 (H) 1.20 - 7.70 x10*3/uL    Immature Granulocytes Absolute, Automated 0.06 0.00 - 0.70 x10*3/uL    Lymphocytes Absolute 0.89 (L) 1.20 - 4.80 x10*3/uL    Monocytes Absolute 0.81 0.10 - 1.00 x10*3/uL    Eosinophils Absolute 0.01 0.00 - 0.70 x10*3/uL    Basophils Absolute 0.09 0.00 - 0.10 x10*3/uL   Comprehensive Metabolic Panel   Result Value Ref Range    Glucose 150 (H) 74 - 99 mg/dL    Sodium 131 (L) 136 - 145 mmol/L    Potassium 4.9 3.5 - 5.3 mmol/L    Chloride 94 (L) 98 - 107 mmol/L    Bicarbonate 26 21 - 32 mmol/L    Anion Gap 16 10 - 20 mmol/L    Urea Nitrogen 36 (H) 6 - 23 mg/dL    Creatinine 5.01 (H) 0.50 - 1.30 mg/dL    eGFR 14 (L) >60 mL/min/1.73m*2    Calcium 9.1 8.6 - 10.6 mg/dL    Albumin 2.5 (L) 3.4 - 5.0 g/dL    Alkaline Phosphatase 211 (H) 33 - 120 U/L    Total Protein 6.1 (L) 6.4 - 8.2 g/dL    AST 13 9 - 39 U/L    Bilirubin, Total 0.7 0.0 - 1.2 mg/dL    ALT 13 10 - 52 U/L   Magnesium   Result Value Ref Range     Magnesium 1.97 1.60 - 2.40 mg/dL   Phosphorus   Result Value Ref Range    Phosphorus 5.2 (H) 2.5 - 4.9 mg/dL   Type And Screen   Result Value Ref Range    ABO TYPE O     Rh TYPE POS     ANTIBODY SCREEN NEG    Morphology   Result Value Ref Range    RBC Morphology See Below     Hypochromia Mild    Heparin Assay, UFH   Result Value Ref Range    Heparin Unfractionated 0.4 See Comment Below for Therapeutic Ranges IU/mL   POCT GLUCOSE   Result Value Ref Range    POCT Glucose 135 (H) 74 - 99 mg/dL                                                                                  GI Procedures     EGD 6/6/2024  - Large more organized clot with some fresh clot   oozing from the base of the clot in the mid   esophagus and in the distal esophagus.   - Normal stomach.   - Small duodenal polyp was seen. Otherwise, the   examined duodenum was normal   - No specimens collected.   EGD 6/4/2024  Red blood in the lower third of the esophagus and   in the middle third of the esophagus.   - Normal stomach.   - A 5 mm sessile polyp was seen in the duocenal   buolb. No biopies taken   - Erythematous duodenopathy.   - No specimens collected.     EGD 7/11/2023  Indications: Coffee-ground emesis   - Normal hypopharynx.   - Nasogastric tube was found ending in the stomach and   was removed.   - LA Grade C esophagitis with superficial ulceration   and no bleeding was found at the GE junction. Possible   healing [place] tear.   - Bilious gastric fluid.   - Mild focal nasogastric tube trauma present in   stomach.   - Otherwise normal appearing stomach. No gastric   ulcers, varices or AVMs. No stigmata of recent   bleeding.   - Normal duodenal bulb and second portion of the   duodenum.   - No specimens collected.       ASSESSMENT / PLAN                  Assessment and Recommendations:     Edwar Hemphill is a 39 y.o. male w/PMH of ESRD on HD (MWF), HFrEF (EF 35-40% 5/2024), PAD s/p L AKA and R above elbow amputation presented from [facility] on 7/22  with hypotension admitted for septic shock due to MRSA bacteremia with TV endocarditis in the setting of HD catheter infection.  GI was consulted for clearance on transitioning heparin gtt for oral AC as a treatment for diffuse blood clots; subclavian DVT, L IJ DVT, b/l innominate occlusion, complete occlusion of intrahepatic IVC s/p angioplasty.   Per care everywhere, pt had EGD in 6/2024 at [facility] for coffee ground emesis. EGD showed esophageal ulcers requiring epi injection/two clips/purastat.   Heparin gtt has started 7/31 and pt had no requirement of transfusion. Additionally reportedly no overt signs of GI bleeding and appears that pt has been tolerating systemic AC.     #Esophageal ulcer  #Subclavian DVT, L IJ DVT, b/l innominate occlusion, complete occlusion of intrahepatic IVC s/p angioplasty on heparin gtt    Plan  -no objection to transition to oral anticoagulation from heparin gtt as no evidence of ongoing GI bleed at this moment  -c/w pantoprazole daily  -please refer to GI clinic on discharge     STONEY per primary team. GI will sign off.  ------------------------------------------------------------------------  No Kimball MD   Gastroenterology Fellow    After 5PM and on Weekends, please page on-call fellow.    Case discussed with service attending Dr. Pan.  Final recommendations pending attending attestation.

## 2024-08-08 LAB
ALBUMIN SERPL BCP-MCNC: 2.3 G/DL (ref 3.4–5)
ALP SERPL-CCNC: 197 U/L (ref 33–120)
ALT SERPL W P-5'-P-CCNC: 11 U/L (ref 10–52)
ANION GAP SERPL CALC-SCNC: 13 MMOL/L (ref 10–20)
AST SERPL W P-5'-P-CCNC: 13 U/L (ref 9–39)
ATRIAL RATE: 70 BPM
BASOPHILS # BLD MANUAL: 0.59 X10*3/UL (ref 0–0.1)
BASOPHILS NFR BLD MANUAL: 4.3 %
BILIRUB SERPL-MCNC: 0.7 MG/DL (ref 0–1.2)
BUN SERPL-MCNC: 22 MG/DL (ref 6–23)
CALCIUM SERPL-MCNC: 8.9 MG/DL (ref 8.6–10.6)
CHLORIDE SERPL-SCNC: 96 MMOL/L (ref 98–107)
CO2 SERPL-SCNC: 28 MMOL/L (ref 21–32)
CREAT SERPL-MCNC: 3.85 MG/DL (ref 0.5–1.3)
EGFRCR SERPLBLD CKD-EPI 2021: 19 ML/MIN/1.73M*2
EOSINOPHIL # BLD MANUAL: 0 X10*3/UL (ref 0–0.7)
EOSINOPHIL NFR BLD MANUAL: 0 %
ERYTHROCYTE [DISTWIDTH] IN BLOOD BY AUTOMATED COUNT: 21.6 % (ref 11.5–14.5)
GLUCOSE BLD MANUAL STRIP-MCNC: 154 MG/DL (ref 74–99)
GLUCOSE BLD MANUAL STRIP-MCNC: 73 MG/DL (ref 74–99)
GLUCOSE BLD MANUAL STRIP-MCNC: 84 MG/DL (ref 74–99)
GLUCOSE BLD MANUAL STRIP-MCNC: 95 MG/DL (ref 74–99)
GLUCOSE BLD MANUAL STRIP-MCNC: 98 MG/DL (ref 74–99)
GLUCOSE SERPL-MCNC: 68 MG/DL (ref 74–99)
HCT VFR BLD AUTO: 23.3 % (ref 41–52)
HGB BLD-MCNC: 7.8 G/DL (ref 13.5–17.5)
HYPOCHROMIA BLD QL SMEAR: ABNORMAL
IMM GRANULOCYTES # BLD AUTO: 0.09 X10*3/UL (ref 0–0.7)
IMM GRANULOCYTES NFR BLD AUTO: 0.7 % (ref 0–0.9)
INR PPP: 1.3 (ref 0.9–1.1)
LYMPHOCYTES # BLD MANUAL: 0.72 X10*3/UL (ref 1.2–4.8)
LYMPHOCYTES NFR BLD MANUAL: 5.2 %
MAGNESIUM SERPL-MCNC: 1.83 MG/DL (ref 1.6–2.4)
MCH RBC QN AUTO: 31 PG (ref 26–34)
MCHC RBC AUTO-ENTMCNC: 33.5 G/DL (ref 32–36)
MCV RBC AUTO: 93 FL (ref 80–100)
MONOCYTES # BLD MANUAL: 0.36 X10*3/UL (ref 0.1–1)
MONOCYTES NFR BLD MANUAL: 2.6 %
NEUTS SEG # BLD MANUAL: 12.02 X10*3/UL (ref 1.2–7)
NEUTS SEG NFR BLD MANUAL: 87.1 %
NRBC BLD-RTO: 0 /100 WBCS (ref 0–0)
P AXIS: 30 DEGREES
P OFFSET: 201 MS
P ONSET: 146 MS
PHOSPHATE SERPL-MCNC: 4.6 MG/DL (ref 2.5–4.9)
PLATELET # BLD AUTO: 67 X10*3/UL (ref 150–450)
POLYCHROMASIA BLD QL SMEAR: ABNORMAL
POTASSIUM SERPL-SCNC: 4.1 MMOL/L (ref 3.5–5.3)
PR INTERVAL: 164 MS
PROT SERPL-MCNC: 5.5 G/DL (ref 6.4–8.2)
PROTHROMBIN TIME: 14.4 SECONDS (ref 9.8–12.8)
Q ONSET: 228 MS
QRS COUNT: 12 BEATS
QRS DURATION: 74 MS
QT INTERVAL: 410 MS
QTC CALCULATION(BAZETT): 442 MS
QTC FREDERICIA: 431 MS
R AXIS: -10 DEGREES
RBC # BLD AUTO: 2.52 X10*6/UL (ref 4.5–5.9)
RBC MORPH BLD: ABNORMAL
SODIUM SERPL-SCNC: 133 MMOL/L (ref 136–145)
T AXIS: 42 DEGREES
T OFFSET: 433 MS
TARGETS BLD QL SMEAR: ABNORMAL
TOTAL CELLS COUNTED BLD: 116
UFH PPP CHRO-ACNC: 0.3 IU/ML
VARIANT LYMPHS # BLD MANUAL: 0.11 X10*3/UL (ref 0–0.5)
VARIANT LYMPHS NFR BLD: 0.8 %
VENTRICULAR RATE: 70 BPM
WBC # BLD AUTO: 13.8 X10*3/UL (ref 4.4–11.3)

## 2024-08-08 PROCEDURE — 85610 PROTHROMBIN TIME: CPT | Performed by: NURSE PRACTITIONER

## 2024-08-08 PROCEDURE — 2060000001 HC INTERMEDIATE ICU ROOM DAILY

## 2024-08-08 PROCEDURE — 2500000001 HC RX 250 WO HCPCS SELF ADMINISTERED DRUGS (ALT 637 FOR MEDICARE OP)

## 2024-08-08 PROCEDURE — 99232 SBSQ HOSP IP/OBS MODERATE 35: CPT | Performed by: NURSE PRACTITIONER

## 2024-08-08 PROCEDURE — 2500000004 HC RX 250 GENERAL PHARMACY W/ HCPCS (ALT 636 FOR OP/ED)

## 2024-08-08 PROCEDURE — 85027 COMPLETE CBC AUTOMATED: CPT

## 2024-08-08 PROCEDURE — 85520 HEPARIN ASSAY: CPT

## 2024-08-08 PROCEDURE — 36415 COLL VENOUS BLD VENIPUNCTURE: CPT | Performed by: NURSE PRACTITIONER

## 2024-08-08 PROCEDURE — 2500000002 HC RX 250 W HCPCS SELF ADMINISTERED DRUGS (ALT 637 FOR MEDICARE OP, ALT 636 FOR OP/ED): Performed by: NURSE PRACTITIONER

## 2024-08-08 PROCEDURE — 2500000001 HC RX 250 WO HCPCS SELF ADMINISTERED DRUGS (ALT 637 FOR MEDICARE OP): Performed by: NURSE PRACTITIONER

## 2024-08-08 PROCEDURE — 82947 ASSAY GLUCOSE BLOOD QUANT: CPT

## 2024-08-08 PROCEDURE — 84100 ASSAY OF PHOSPHORUS: CPT

## 2024-08-08 PROCEDURE — 83735 ASSAY OF MAGNESIUM: CPT

## 2024-08-08 PROCEDURE — 85007 BL SMEAR W/DIFF WBC COUNT: CPT

## 2024-08-08 PROCEDURE — 84520 ASSAY OF UREA NITROGEN: CPT

## 2024-08-08 PROCEDURE — 99232 SBSQ HOSP IP/OBS MODERATE 35: CPT | Performed by: INTERNAL MEDICINE

## 2024-08-08 RX ORDER — MIDODRINE HYDROCHLORIDE 10 MG/1
10 TABLET ORAL
Status: DISCONTINUED | OUTPATIENT
Start: 2024-08-08 | End: 2024-08-14

## 2024-08-08 RX ORDER — PANTOPRAZOLE SODIUM 40 MG/1
40 TABLET, DELAYED RELEASE ORAL
Status: DISCONTINUED | OUTPATIENT
Start: 2024-08-08 | End: 2024-08-22 | Stop reason: HOSPADM

## 2024-08-08 RX ORDER — WARFARIN SODIUM 5 MG/1
5 TABLET ORAL DAILY
Status: DISCONTINUED | OUTPATIENT
Start: 2024-08-08 | End: 2024-08-10

## 2024-08-08 RX ORDER — PANTOPRAZOLE SODIUM 40 MG/1
40 TABLET, DELAYED RELEASE ORAL
Status: DISCONTINUED | OUTPATIENT
Start: 2024-09-06 | End: 2024-08-22 | Stop reason: HOSPADM

## 2024-08-08 ASSESSMENT — COGNITIVE AND FUNCTIONAL STATUS - GENERAL
DRESSING REGULAR LOWER BODY CLOTHING: TOTAL
MOVING FROM LYING ON BACK TO SITTING ON SIDE OF FLAT BED WITH BEDRAILS: TOTAL
DAILY ACTIVITIY SCORE: 8
MOVING TO AND FROM BED TO CHAIR: TOTAL
CLIMB 3 TO 5 STEPS WITH RAILING: TOTAL
TURNING FROM BACK TO SIDE WHILE IN FLAT BAD: TOTAL
WALKING IN HOSPITAL ROOM: TOTAL
HELP NEEDED FOR BATHING: TOTAL
MOBILITY SCORE: 6
DRESSING REGULAR UPPER BODY CLOTHING: TOTAL
STANDING UP FROM CHAIR USING ARMS: TOTAL
PERSONAL GROOMING: TOTAL
EATING MEALS: A LITTLE
TOILETING: TOTAL

## 2024-08-08 ASSESSMENT — PAIN SCALES - GENERAL
PAINLEVEL_OUTOF10: 3
PAINLEVEL_OUTOF10: 0 - NO PAIN
PAINLEVEL_OUTOF10: 8
PAINLEVEL_OUTOF10: 3
PAINLEVEL_OUTOF10: 5 - MODERATE PAIN
PAINLEVEL_OUTOF10: 8

## 2024-08-08 ASSESSMENT — PAIN - FUNCTIONAL ASSESSMENT
PAIN_FUNCTIONAL_ASSESSMENT: 0-10

## 2024-08-08 NOTE — PROGRESS NOTES
08/08/24 1000   Discharge Planning   Living Arrangements Other (Comment)  (Pt is a long term resident at [facility])   Support Systems Parent;Family members   Type of Residence Nursing home/residential care   Home or Post Acute Services Post acute facilities (Rehab/SNF/etc)   Type of Post Acute Facility Services Long term care   Does the patient need discharge transport arranged? Yes   RoundTrip coordination needed? Yes     SW met with pt to introduce self and role.  Pt is alert and oriented x3.  He reports he was at [facility] prior to admission and plans to return when medically stable.  Pt states he receives dialysis at [facility].  Nursing facility transports pt to and from dialysis.  SW will follow.  YEE Warren

## 2024-08-08 NOTE — PROGRESS NOTES
"Edwar Hemphill is a 39 y.o. male on day 17 of admission presenting with Septic shock (Multi).    Subjective     No acute events overnight    Objective   Physical Exam:  Constitutional: pt in NAD, alert and cooperative  Eyes: PERRL, EOMI, no icterus   ENMT: mucous membranes moist  Head/Neck: Neck supple, no apparent injury  Respiratory/Thorax: Lungs diminished bilaterally, non-labored breathing, no cough, on RA  Cardiovascular: Regular, rate and rhythm, no murmurs, normal S1 and S2  Gastrointestinal: Nondistended, soft, non-tender, BS present x 4  Musculoskeletal: ROM intact  Extremities: 1+ edema RLE  Neurological: alert and oriented x 3, flat affect, speech clear, follows commands appropriately, without focal deficits  Skin: dry and flaky, incision to left wrist c/d/I, pressure injury to right heel c/d/I and pressure injury to sacrum c/d/i    Vital Signs  Blood pressure 90/59, pulse 79, temperature 35.5 °C (95.9 °F), temperature source Temporal, resp. rate 10, height 1.753 m (5' 9.02\"), weight 82.4 kg (181 lb 10.5 oz), SpO2 94%.  Oxygen Therapy  SpO2: 94 %  Medical Gas Therapy: None (Room air)  O2 Delivery Method: Nasal cannula       Intake/Output last 3 Shifts:  I/O last 3 completed shifts:  In: 2200 (31 mL/kg) [I.V.:1200 (16.9 mL/kg); Other:1000]  Out: 721 (10.2 mL/kg) [Emesis/NG output:1; Other:720]  Dosing Weight: 70.9 kg     Lines and Tubes:  Peripheral IV 07/22/24 16 G Left;Anterior External Jugular (Active)   Placement Date/Time: 07/22/24 1048   Hand Hygiene Completed: Yes  Size (Gauge): 16 G  Orientation: Left;Anterior  Location: External Jugular  Site Prep: Chlorhexidine    Number of days: 16       Hemodialysis Cath Right Femoral (Active)   No placement date or time found.   Orientation: Right  Access Location: Femoral   Number of days:          Relevant Results  Scheduled medications  atorvastatin, 40 mg, oral, Nightly  B complex-vitamin C-folic acid, 1 capsule, oral, Daily  bisacodyl, 10 mg, rectal, " Daily  cholecalciferol, 50,000 Units, oral, Once per day on Monday Thursday  epoetin tyson or biosimilar, 10,000 Units, subcutaneous, Once per day on Monday Wednesday Friday  folic acid, 1 mg, oral, Daily  insulin glargine, 8 Units, subcutaneous, Nightly  insulin lispro, 0-15 Units, subcutaneous, TID  levothyroxine, 150 mcg, oral, Daily before breakfast  melatonin, 6 mg, oral, Nightly  midodrine, 10 mg, oral, BID  midodrine, 15 mg, oral, Nightly  nystatin, 1 Application, Topical, BID  pantoprazole, 40 mg, oral, BID AC  [START ON 9/6/2024] pantoprazole, 40 mg, oral, Daily before breakfast  polyethylene glycol, 17 g, oral, BID  sennosides-docusate sodium, 1 tablet, oral, BID  thiamine, 200 mg, oral, Daily  warfarin, 5 mg, oral, Daily      Continuous medications  heparin, 0-4,500 Units/hr, Last Rate: 1,700 Units/hr (08/08/24 0618)      PRN medications  PRN medications: acetaminophen **OR** [DISCONTINUED] acetaminophen **OR** [DISCONTINUED] acetaminophen, dextrose, dextrose, diphenhydrAMINE, glucagon, glucagon, heparin, heparin, HYDROmorphone, lidocaine, ondansetron, oxyCODONE, vancomycin    Results for orders placed or performed during the hospital encounter of 07/22/24 (from the past 24 hour(s))   POCT GLUCOSE   Result Value Ref Range    POCT Glucose 106 (H) 74 - 99 mg/dL   POCT GLUCOSE   Result Value Ref Range    POCT Glucose 104 (H) 74 - 99 mg/dL   CBC and Auto Differential   Result Value Ref Range    WBC 13.8 (H) 4.4 - 11.3 x10*3/uL    nRBC 0.0 0.0 - 0.0 /100 WBCs    RBC 2.52 (L) 4.50 - 5.90 x10*6/uL    Hemoglobin 7.8 (L) 13.5 - 17.5 g/dL    Hematocrit 23.3 (L) 41.0 - 52.0 %    MCV 93 80 - 100 fL    MCH 31.0 26.0 - 34.0 pg    MCHC 33.5 32.0 - 36.0 g/dL    RDW 21.6 (H) 11.5 - 14.5 %    Platelets 67 (L) 150 - 450 x10*3/uL    Immature Granulocytes %, Automated 0.7 0.0 - 0.9 %    Immature Granulocytes Absolute, Automated 0.09 0.00 - 0.70 x10*3/uL   Comprehensive Metabolic Panel   Result Value Ref Range    Glucose 68 (L)  74 - 99 mg/dL    Sodium 133 (L) 136 - 145 mmol/L    Potassium 4.1 3.5 - 5.3 mmol/L    Chloride 96 (L) 98 - 107 mmol/L    Bicarbonate 28 21 - 32 mmol/L    Anion Gap 13 10 - 20 mmol/L    Urea Nitrogen 22 6 - 23 mg/dL    Creatinine 3.85 (H) 0.50 - 1.30 mg/dL    eGFR 19 (L) >60 mL/min/1.73m*2    Calcium 8.9 8.6 - 10.6 mg/dL    Albumin 2.3 (L) 3.4 - 5.0 g/dL    Alkaline Phosphatase 197 (H) 33 - 120 U/L    Total Protein 5.5 (L) 6.4 - 8.2 g/dL    AST 13 9 - 39 U/L    Bilirubin, Total 0.7 0.0 - 1.2 mg/dL    ALT 11 10 - 52 U/L   Magnesium   Result Value Ref Range    Magnesium 1.83 1.60 - 2.40 mg/dL   Phosphorus   Result Value Ref Range    Phosphorus 4.6 2.5 - 4.9 mg/dL   Protime-INR   Result Value Ref Range    Protime 14.4 (H) 9.8 - 12.8 seconds    INR 1.3 (H) 0.9 - 1.1   Heparin Assay, UFH   Result Value Ref Range    Heparin Unfractionated 0.3 See Comment Below for Therapeutic Ranges IU/mL   Manual Differential   Result Value Ref Range    Neutrophils %, Manual 87.1 40.0 - 80.0 %    Lymphocytes %, Manual 5.2 13.0 - 44.0 %    Monocytes %, Manual 2.6 2.0 - 10.0 %    Eosinophils %, Manual 0.0 0.0 - 6.0 %    Basophils %, Manual 4.3 0.0 - 2.0 %    Atypical Lymphocytes %, Manual 0.8 0.0 - 2.0 %    Seg Neutrophils Absolute, Manual 12.02 (H) 1.20 - 7.00 x10*3/uL    Lymphocytes Absolute, Manual 0.72 (L) 1.20 - 4.80 x10*3/uL    Monocytes Absolute, Manual 0.36 0.10 - 1.00 x10*3/uL    Eosinophils Absolute, Manual 0.00 0.00 - 0.70 x10*3/uL    Basophils Absolute, Manual 0.59 (H) 0.00 - 0.10 x10*3/uL    Atypical Lymphs Absolute, Manual 0.11 0.00 - 0.50 x10*3/uL    Total Cells Counted 116     RBC Morphology See Below     Polychromasia Mild     Hypochromia Mild     Target Cells Few    POCT GLUCOSE   Result Value Ref Range    POCT Glucose 73 (L) 74 - 99 mg/dL   POCT GLUCOSE   Result Value Ref Range    POCT Glucose 84 74 - 99 mg/dL     XR abdomen 1 view    Result Date: 8/7/2024  Interpreted By:  Koffi Stroud, STUDY: XR ABDOMEN 1 VIEW;  8/7/2024 9:12 am   INDICATION: Signs/Symptoms:Vomiting.   COMPARISON: 01/17/2024.   ACCESSION NUMBER(S): [id number]   ORDERING CLINICIAN: UMAIR VALDEZ   FINDINGS: There is a right femoral venous catheter with tip overlying the right atrium. Moderate amount of stool in nondistended loops of bowel. Correlate with a component of fecal impaction. Limited evaluation of pneumoperitoneum on supine imaging, however no gross evidence of free air is noted.   Left-sided pleural effusion. There is extensive vascular calcifications.   Osseous structures demonstrate no acute bony changes.       1.  No gross bowel distention. Moderate amount of stool within the colon and correlate with a component of fecal impaction.   Signed by: Koffi Stroud 8/7/2024 10:42 AM Dictation workstation:   [id number]    CT head wo IV contrast    Result Date: 8/6/2024  Interpreted By:  Nakul Wang, STUDY: CT HEAD WO IV CONTRAST;  8/6/2024 4:32 pm   INDICATION: Signs/Symptoms:c/f emboli iso large PFO.   COMPARISON: None.   ACCESSION NUMBER(S): [id number]   ORDERING CLINICIAN: ANGELIKA SELLERS   TECHNIQUE: Noncontrast enhanced CT was performed from the skullbase to the vertex.   FINDINGS: There is minimal prominence of the cortical sulci and sylvian fissures without hydrocephalus. There is no evidence of intracranial mass or extra-axial collection. The skull base, paranasal sinuses and orbital structures are normal. The calvarium is normal.       Minimal cortical volume loss without hydrocephalus, hemorrhage or extra-axial collection.   MACRO: none   Signed by: Nakul Wang 8/6/2024 4:43 PM Dictation workstation:   [id number]     XR chest 1 view 07/31/2024    Narrative  Interpreted By:  Eugene Romero and Ritchie Brandon  STUDY:  XR CHEST 1 VIEW;  7/31/2024 12:01 am    INDICATION:  Signs/Symptoms:pulmonary edema.    COMPARISON:  Chest x-ray 07/22/2024.    ACCESSION NUMBER(S):  [id number]    ORDERING CLINICIAN:  MARK  FARESS    FINDINGS:  AP radiograph of the chest was provided.    CARDIOMEDIASTINAL SILHOUETTE:  Cardiomediastinal silhouette is normal in size and configuration.    LUNGS:  Mildly increased perihilar/interstitial markings compared to prior  examination and hazy bibasilar airspace opacities. Unchanged linear  atelectasis of the left lung base. There is blunting of the bilateral  costophrenic angles consistent with small pleural effusions. No  evidence of pneumothorax.    ABDOMEN:  No remarkable upper abdominal findings.    BONES:  No acute osseous changes.    Impression  1.  Mildly increased perihilar/interstitial lung markings with hazy  bibasilar airspace opacities. Recommend clinical correlation as  findings could reflect developing interstitial/pulmonary edema.  Underlying infectious process can not definitively be excluded.  2. Persistent small bilateral pleural effusions and unchanged linear  atelectasis of the left lung base.    I personally reviewed the images/study and I agree with the findings  as stated by resident Neftaly Harkins. This study was interpreted  at [place], [place].    MACRO:  None    Signed by: Eugene Romero 7/31/2024 8:44 AM  Dictation workstation:   [id number]      Assessment/Plan   Principal Problem:    Septic shock (Multi)  Active Problems:    Acute infective endocarditis (HHS-HCC)    Tricuspid valve vegetation (HHS-HCC)    Staphylococcal arthritis of left wrist (Multi)    Infection of left wrist (Multi)    Edwar Hemphill is a 39 y.o. admitted with septic shock 2/2 infective endocarditis.        Neuro:  #Acute left wrist pain  - A&Ox3 with flat affect  - Pain reg: tylenol PRN, oxycodone prn, and Dilaudid prn   - Melatonin HS  - PT/OT/OOB  - Delirium Precautions    CV:  #Infective endocarditis with TV vegetation, Severely attenuated superior vena cava, bilateral brachiocephalic, subclavian and internal jugular veins with the calcifications  in the brachiocephalic veins with extensive collaterals in the chest wall likely representing chronic thrombosis.   #Persistent hypotension.   - HDS   - Patient had vegectomy aborted due to to concerns for infected thrombus in the IVC.   - Continue Atorvastatin   - Midodrine decreased from 15mg Q8hrs to 10mg BID during day and 15mg HS  - MAP goal >65       Pulm: No acute issues  - Stable on RA      GI/FEN:  #Hx Gerd, Esophagitis  #Recent GIB June 2024 s/p clipping x2  #Constipation, improved  - Bmx2  - Multiple episodes of emesis yesterday. KUB with moderate stool burden.  No further episodes of emesis noted  - Bowel Reg: Dulcolax suppository daily, Aracelis-colace BID, and Miralax BID  - Diet: Regular   - GI recommending BID PPI x1 month then switch to daily indefinitely.  Will need to follow up with Dr. No Kimball as O/p  - PRN zofran  - Daily RFP/Mag    Renal:  #Hx ESRD on iHD MWF   - Tolerated iHD 8/7  - Nephrology following: Plan for dialysis 8/9  - Dialysis via Right fem HD line placed in IR.    Endo:  #Hx DM  #Hypothyroid  - Lantus 8u HS and SSI TID  - Hypoglycemia protocol  - Continue Levothyroxine 150mg daily.  Re-check TSH on 8/12 (ordered)  - HgbA1C 6.8 3/30/24     Heme:  #Anema of chronic disease  #Thrombocytopenia, stable  - Vascular Medicine consulted: Okay to begin bridge to coumadin, still need to r/o Mycotic aneurysm.  CT angio head ordered.  If Mycotic aneurysms noted will need to have further discussion regarding anticoagulation   - Start coumadin bridge tonight  - Continue heparin gtt via HD line  - Daily CBC and prn    ID:  #Hx of Acinetobacter  #MRSA and Staph Haemolyticus bacteremia  #Septic arthritis (MRSA)  - Afebrile with improving leukocytosis  - ID following, signed off 8/6         > Continue vancomycin post-HD through end date 9/13          > Will trial indefinite suppression with Bactrim 1SS daily after vancomycin course   - Micro: 7/22 and 7/25 BC with Staph aureus; 7/27 BC Staph  Haemolyticus; 7/27 synovial fluid from Left wrist MRSA, BC 7/29 and 7/31 NGF  - Continue Vancomycin through 9/13      PPX:  DVT: Heparin   GI: PPI BID    Access/Lines: Right femoral dialysis line, Left EJ    Code Status: Full Code   NOK: Shanthi Hemphill, Parent, [phone]    Dispo: Continue care in SDU    Pt discussed with Dr. Allen, seen and examined. All labs, VS and previous plan of care reviewed.      Delvin Gomez, APRN-CNP

## 2024-08-08 NOTE — PROGRESS NOTES
Edwar Hemphill is a 39 y.o. male on day 17 of admission.   Medical history significant for anemia of chronic disease, ESRD on HD via right femoral line, HFrEF, multiple line-related DVTs (BIJ, BUE, BLE treated with Eliquis prior to recent upper GIB 6/15/24), NICM, paroxsymal a-fib, PFO, PVD (s/p left AKA, right forearm amputation), T2DM, tobacco use.   Admitted to MICU for treatment of septic shock due to finding of infective endocarditis with TV vegetation due to HD line infection (line in right femoral).     Vascular Medicine Service consulted 7/23/24 for anticoagulation recommendations given recurrent line-related VTEs.  Most recent VTE 4/2024 (bilateral subclavian, LIJ DVT, bilateral innominate occlusion, complete occlusion of intrahepatic IVC s/p angioplasty).  Previously treated with Eliquis 5mg BID until experiencing UGIB with admission to [facility] for treatment.  Patient not a candidate for IVCF due to right femoral HD line. Heparin infusion started 8/2/24.  Vascular Medicine Service re-consulted for home going anticoagulation recommendations.   Patient with continued thrombocytopenia, and HIT has been ruled out.   Continues with Heparin infusion, with plan to start bridge to warfarin later today.     Subjective   Patient reports pain in the left arm.  Denies CP, SOB or bleeding.      Objective   Continues with Heparin infusion via right femoral line.  No overt signs of bleeding.     Physical Exam  Ill-appearing middle aged male, resting in bed in NAD  Respirations full and easy; scattered fine rales in bilateral upper lobes; on RA  Normal heart sounds with regular rate/rhythm; cardiac monitor shows NSR; vital signs are stable  Abdomen soft and nontender to palpation  Extremities are warm to touch: left arm and right leg; patient s/p amputation of right arm and left AKA; Kerlix wrap to the left forearm.  Right femoral line in place with Heparin infusing; left internal jugular line in place and capped.  Patient is  "awake, and briefly participates in conversation; flat affect, tells me he is tired today.    Last Recorded Vitals  Blood pressure 100/63, pulse 80, temperature 35.8 °C (96.4 °F), resp. rate 10, height 1.753 m (5' 9.02\"), weight 82.4 kg (181 lb 10.5 oz), SpO2 94%.  Intake/Output last 3 Shifts:  I/O last 3 completed shifts:  In: 2200 (31 mL/kg) [I.V.:1200 (16.9 mL/kg); Other:1000]  Out: 721 (10.2 mL/kg) [Emesis/NG output:1; Other:720]  Dosing Weight: 70.9 kg     Relevant Results  Scheduled medications  atorvastatin, 40 mg, oral, Nightly  B complex-vitamin C-folic acid, 1 capsule, oral, Daily  bisacodyl, 10 mg, rectal, Daily  cholecalciferol, 50,000 Units, oral, Once per day on Monday Thursday  epoetin tyson or biosimilar, 10,000 Units, subcutaneous, Once per day on Monday Wednesday Friday  folic acid, 1 mg, oral, Daily  insulin glargine, 8 Units, subcutaneous, Nightly  insulin lispro, 0-15 Units, subcutaneous, TID  levothyroxine, 150 mcg, oral, Daily before breakfast  melatonin, 6 mg, oral, Nightly  midodrine, 10 mg, oral, BID  midodrine, 15 mg, oral, Nightly  nystatin, 1 Application, Topical, BID  pantoprazole, 40 mg, oral, BID AC  [START ON 9/6/2024] pantoprazole, 40 mg, oral, Daily before breakfast  polyethylene glycol, 17 g, oral, BID  sennosides-docusate sodium, 1 tablet, oral, BID  thiamine, 200 mg, oral, Daily  warfarin, 5 mg, oral, Daily      Continuous medications  heparin, 0-4,500 Units/hr, Last Rate: 1,700 Units/hr (08/08/24 0618)      Results from last 7 days   Lab Units 08/08/24  0355 08/07/24  0437 08/06/24  0610   WBC AUTO x10*3/uL 13.8* 15.5* 8.5   HEMOGLOBIN g/dL 7.8* 8.2* 7.9*   HEMATOCRIT % 23.3* 26.5* 23.9*   PLATELETS AUTO x10*3/uL 67* 64* 48*       Results from last 7 days   Lab Units 08/08/24  0355 08/07/24  0437 08/06/24  0610   SODIUM mmol/L 133* 131* 131*   POTASSIUM mmol/L 4.1 4.9 4.6   CHLORIDE mmol/L 96* 94* 96*   CO2 mmol/L 28 26 25   BUN mg/dL 22 36* 31*   CREATININE mg/dL 3.85* 5.01* " 4.93*   GLUCOSE mg/dL 68* 150* 151*   CALCIUM mg/dL 8.9 9.1 9.0       Results from last 7 days   Lab Units 08/08/24  0355   INR  1.3*       Results from last 7 days   Lab Units 08/08/24  0355 08/07/24  0539 08/06/24  0610   ANTI XA UNFRACTIONATED IU/mL 0.3 0.4 0.4      CT HEAD WO IV CONTRAST;  8/6/2024 4:32 pm   INDICATION:  Signs/Symptoms: c/f emboli iso large PFO.   FINDINGS:  There is minimal prominence of the cortical sulci and sylvian  fissures without hydrocephalus. There is no evidence of intracranial  mass or extra-axial collection. The skull base, paranasal sinuses and  orbital structures are normal. The calvarium is normal.       Impression   Minimal cortical volume loss without hydrocephalus, hemorrhage or  extra-axial collection.     [facility] US LOWER EXTREMITY VENOUS DUPLEX RIGHT   Study Date: 7/24/2024   CONCLUSIONS:  Right Lower Venous: Unable to obtain right groin compression or visualize the DEIV, CFV, profunda and proximal FVs due to line placement. Can not rule out thrombus in non-visualized areas. Remainder visualized vessels show no evidence of DVT.  Left Lower Venous: There are chronic changes visualized in the common femoral and proximal femoral veins.    CT C/A/P 7/25/24  CHEST:  1. Interval development of bilateral pulmonary nodular opacities  several of them showing cavitation. Findings are concerning for  septic embolism/cavitary pneumonia.  2. Bilateral pleural effusions.  3. Right femoral central venous catheter with tip terminating in the  right ventricle. Hypodense thrombus around the catheter in the right  atrium correlating with the vegetation seen on prior echocardiogram.  4. Severely attenuated superior vena cava, bilateral brachiocephalic,  subclavian and internal jugular veins with the calcifications in the  brachiocephalic veins with extensive collaterals in the chest wall  likely representing chronic thrombosis.      ABDOMEN-PELVIS:  1. Severely attenuated inferior vena cava with  multiple  calcifications, consistent with chronic IVC thrombosis.  2. Interval development of a T10 vertebral body fracture.  Re-demonstration of severe osseous changes throughout the  thoracolumbar spine. Findings are likely secondary to renal  osteodystrophy.  3. Diffuse thickening of the colon and rectum with the pericolonic  stranding consistent with the proctocolitis which can be  inflammatory/infectious in etiology. Correlate with clinical symptoms.  4. Diffuse mesenteric haziness and anasarca likely representing fluid  overload.  5. Bilateral atrophied kidneys with hypoenhancement, representative  of medical renal disease.    Assessment/Plan   Principal Problem:    Septic shock (Multi)  Active Problems:    Acute infective endocarditis (HHS-HCC)    Tricuspid valve vegetation (HHS-HCC)    Staphylococcal arthritis of left wrist (Multi)    Infection of left wrist (Multi)    39 year old male with complex medical history, admitted for infective endocarditis due to HD line infection complicated by septic shock.  Vascular Medicine Service following for anticoagulation recommendations.   Review of labs shows stable hemoglobin 7.8 grams, continued thrombocytopenia with stable platelets 67K without evidence of bleeding, and creatinine of 3.85 reflecting known ESRD.  Last hemodialysis was 8/7/24 with next treatment scheduled for 8/9/24.   Continues with Heparin infusion with therapeutic assay 0.4.  Heparin infusion has remained stable since initiation 8/2/24.   Plan to start bridge from Heparin infusion to Warfarin today, as there are no planned invasive tests or interventions. GI Team was consulted 8/7/24 and agrees that transition to oral anticoagulation is reasonable.  They are recommending continuing PPI BID for another month, then transition to daily dosing.   Ideally patient will continue with Heparin infusion as bridge to Warfarin without need for stopping/starting due to limited IV access.   Patient given booklet  "\"Your Guide to Taking Warfarin\".    Date    INR     Warfarin Dose     Comments   8/8      1.3           5mg                Baseline INR; Start bridge to Warfarin    Recommendations:  ~CONTINUE Heparin infusion; follow nomogram to achieve therapeutic assay 0.3-0.7  ~START Warfarin 5mg this evening.  ~MONITOR for bleeding  ~Daily labs: CBC (monitor hg and plts), Heparin assay, INR  ~Will need a minimum of 5 days of overlap therapy with Heparin and Warfarin; goal INR is 2-3; when INR is >2 for 2 days in a row, Heparin can be stopped   ~Will need INR monitoring at [facility] 2 times a week, with\"as needed\" Warfarin dose adjustment to maintain INR goal 2-3. When patient discharged to home from SNF, will need outpatient Coumadin Clinic arranged prior to SNF discharge with initial appointment scheduled for 1-2 days after discharge.     Plan of care discussed with Dr. Nhung Carrillo  Plan of care discussed in person with Mr. Delvin ROBLERO from the [facility] SDU Team    OSBALDO Richardson  Vascular Medicine Service   Pager [phone]    "

## 2024-08-09 VITALS — HEART RATE: 93 BPM | TEMPERATURE: 96.3 F

## 2024-08-09 LAB
ALBUMIN SERPL BCP-MCNC: 2.3 G/DL (ref 3.4–5)
ALBUMIN SERPL BCP-MCNC: 2.4 G/DL (ref 3.4–5)
ALP SERPL-CCNC: 176 U/L (ref 33–120)
ALT SERPL W P-5'-P-CCNC: 10 U/L (ref 10–52)
ANION GAP SERPL CALC-SCNC: 16 MMOL/L (ref 10–20)
ANION GAP SERPL CALC-SCNC: 17 MMOL/L (ref 10–20)
AST SERPL W P-5'-P-CCNC: 10 U/L (ref 9–39)
BASOPHILS # BLD AUTO: 0.12 X10*3/UL (ref 0–0.1)
BASOPHILS NFR BLD AUTO: 1.3 %
BILIRUB SERPL-MCNC: 0.9 MG/DL (ref 0–1.2)
BUN SERPL-MCNC: 14 MG/DL (ref 6–23)
BUN SERPL-MCNC: 25 MG/DL (ref 6–23)
BURR CELLS BLD QL SMEAR: NORMAL
CALCIUM SERPL-MCNC: 8.5 MG/DL (ref 8.6–10.6)
CALCIUM SERPL-MCNC: 9.1 MG/DL (ref 8.6–10.6)
CHLORIDE SERPL-SCNC: 95 MMOL/L (ref 98–107)
CHLORIDE SERPL-SCNC: 95 MMOL/L (ref 98–107)
CO2 SERPL-SCNC: 24 MMOL/L (ref 21–32)
CO2 SERPL-SCNC: 25 MMOL/L (ref 21–32)
CREAT SERPL-MCNC: 2.47 MG/DL (ref 0.5–1.3)
CREAT SERPL-MCNC: 4.09 MG/DL (ref 0.5–1.3)
EGFRCR SERPLBLD CKD-EPI 2021: 18 ML/MIN/1.73M*2
EGFRCR SERPLBLD CKD-EPI 2021: 33 ML/MIN/1.73M*2
EOSINOPHIL # BLD AUTO: 0.07 X10*3/UL (ref 0–0.7)
EOSINOPHIL NFR BLD AUTO: 0.8 %
ERYTHROCYTE [DISTWIDTH] IN BLOOD BY AUTOMATED COUNT: 21.3 % (ref 11.5–14.5)
ERYTHROCYTE [DISTWIDTH] IN BLOOD BY AUTOMATED COUNT: 21.5 % (ref 11.5–14.5)
GLUCOSE BLD MANUAL STRIP-MCNC: 112 MG/DL (ref 74–99)
GLUCOSE BLD MANUAL STRIP-MCNC: 162 MG/DL (ref 74–99)
GLUCOSE BLD MANUAL STRIP-MCNC: 188 MG/DL (ref 74–99)
GLUCOSE SERPL-MCNC: 121 MG/DL (ref 74–99)
GLUCOSE SERPL-MCNC: 160 MG/DL (ref 74–99)
HCT VFR BLD AUTO: 23.8 % (ref 41–52)
HCT VFR BLD AUTO: 24.5 % (ref 41–52)
HGB BLD-MCNC: 7.2 G/DL (ref 13.5–17.5)
HGB BLD-MCNC: 7.5 G/DL (ref 13.5–17.5)
HYPOCHROMIA BLD QL SMEAR: NORMAL
IMM GRANULOCYTES # BLD AUTO: 0.06 X10*3/UL (ref 0–0.7)
IMM GRANULOCYTES NFR BLD AUTO: 0.7 % (ref 0–0.9)
INR PPP: 1.2 (ref 0.9–1.1)
LYMPHOCYTES # BLD AUTO: 1.2 X10*3/UL (ref 1.2–4.8)
LYMPHOCYTES NFR BLD AUTO: 13 %
MAGNESIUM SERPL-MCNC: 1.95 MG/DL (ref 1.6–2.4)
MCH RBC QN AUTO: 29.6 PG (ref 26–34)
MCH RBC QN AUTO: 29.9 PG (ref 26–34)
MCHC RBC AUTO-ENTMCNC: 30.3 G/DL (ref 32–36)
MCHC RBC AUTO-ENTMCNC: 30.6 G/DL (ref 32–36)
MCV RBC AUTO: 97 FL (ref 80–100)
MCV RBC AUTO: 99 FL (ref 80–100)
MONOCYTES # BLD AUTO: 0.86 X10*3/UL (ref 0.1–1)
MONOCYTES NFR BLD AUTO: 9.3 %
NEUTROPHILS # BLD AUTO: 6.9 X10*3/UL (ref 1.2–7.7)
NEUTROPHILS NFR BLD AUTO: 74.9 %
NRBC BLD-RTO: 0 /100 WBCS (ref 0–0)
NRBC BLD-RTO: 0 /100 WBCS (ref 0–0)
PHOSPHATE SERPL-MCNC: 3.7 MG/DL (ref 2.5–4.9)
PHOSPHATE SERPL-MCNC: 5.1 MG/DL (ref 2.5–4.9)
PLATELET # BLD AUTO: 91 X10*3/UL (ref 150–450)
PLATELET # BLD AUTO: 91 X10*3/UL (ref 150–450)
POLYCHROMASIA BLD QL SMEAR: NORMAL
POTASSIUM SERPL-SCNC: 4.1 MMOL/L (ref 3.5–5.3)
POTASSIUM SERPL-SCNC: 4.6 MMOL/L (ref 3.5–5.3)
PROT SERPL-MCNC: 5.5 G/DL (ref 6.4–8.2)
PROTHROMBIN TIME: 13.8 SECONDS (ref 9.8–12.8)
RBC # BLD AUTO: 2.41 X10*6/UL (ref 4.5–5.9)
RBC # BLD AUTO: 2.53 X10*6/UL (ref 4.5–5.9)
RBC MORPH BLD: NORMAL
SODIUM SERPL-SCNC: 131 MMOL/L (ref 136–145)
SODIUM SERPL-SCNC: 132 MMOL/L (ref 136–145)
UFH PPP CHRO-ACNC: 0.1 IU/ML
UFH PPP CHRO-ACNC: 0.3 IU/ML
VANCOMYCIN SERPL-MCNC: 12.6 UG/ML (ref 5–20)
WBC # BLD AUTO: 9.2 X10*3/UL (ref 4.4–11.3)
WBC # BLD AUTO: 9.2 X10*3/UL (ref 4.4–11.3)

## 2024-08-09 PROCEDURE — 2500000004 HC RX 250 GENERAL PHARMACY W/ HCPCS (ALT 636 FOR OP/ED)

## 2024-08-09 PROCEDURE — 6350000001 HC RX 635 EPOETIN >10,000 UNITS: Mod: JZ

## 2024-08-09 PROCEDURE — 85520 HEPARIN ASSAY: CPT

## 2024-08-09 PROCEDURE — 84075 ASSAY ALKALINE PHOSPHATASE: CPT

## 2024-08-09 PROCEDURE — 36415 COLL VENOUS BLD VENIPUNCTURE: CPT

## 2024-08-09 PROCEDURE — 80069 RENAL FUNCTION PANEL: CPT | Mod: CCI | Performed by: NURSE PRACTITIONER

## 2024-08-09 PROCEDURE — 82947 ASSAY GLUCOSE BLOOD QUANT: CPT

## 2024-08-09 PROCEDURE — 2500000001 HC RX 250 WO HCPCS SELF ADMINISTERED DRUGS (ALT 637 FOR MEDICARE OP): Performed by: NURSE PRACTITIONER

## 2024-08-09 PROCEDURE — 85610 PROTHROMBIN TIME: CPT | Performed by: NURSE PRACTITIONER

## 2024-08-09 PROCEDURE — 90935 HEMODIALYSIS ONE EVALUATION: CPT | Performed by: INTERNAL MEDICINE

## 2024-08-09 PROCEDURE — 85025 COMPLETE CBC W/AUTO DIFF WBC: CPT

## 2024-08-09 PROCEDURE — 2060000001 HC INTERMEDIATE ICU ROOM DAILY

## 2024-08-09 PROCEDURE — 2500000002 HC RX 250 W HCPCS SELF ADMINISTERED DRUGS (ALT 637 FOR MEDICARE OP, ALT 636 FOR OP/ED): Performed by: NURSE PRACTITIONER

## 2024-08-09 PROCEDURE — 8010000001 HC DIALYSIS - HEMODIALYSIS PER DAY

## 2024-08-09 PROCEDURE — 2500000004 HC RX 250 GENERAL PHARMACY W/ HCPCS (ALT 636 FOR OP/ED): Performed by: NURSE PRACTITIONER

## 2024-08-09 PROCEDURE — 85027 COMPLETE CBC AUTOMATED: CPT | Performed by: NURSE PRACTITIONER

## 2024-08-09 PROCEDURE — 2500000001 HC RX 250 WO HCPCS SELF ADMINISTERED DRUGS (ALT 637 FOR MEDICARE OP)

## 2024-08-09 PROCEDURE — 83735 ASSAY OF MAGNESIUM: CPT

## 2024-08-09 PROCEDURE — 99232 SBSQ HOSP IP/OBS MODERATE 35: CPT | Performed by: NURSE PRACTITIONER

## 2024-08-09 PROCEDURE — 99233 SBSQ HOSP IP/OBS HIGH 50: CPT | Performed by: INTERNAL MEDICINE

## 2024-08-09 PROCEDURE — 80202 ASSAY OF VANCOMYCIN: CPT

## 2024-08-09 PROCEDURE — 84100 ASSAY OF PHOSPHORUS: CPT

## 2024-08-09 PROCEDURE — 2500000002 HC RX 250 W HCPCS SELF ADMINISTERED DRUGS (ALT 637 FOR MEDICARE OP, ALT 636 FOR OP/ED)

## 2024-08-09 RX ORDER — VANCOMYCIN HYDROCHLORIDE 750 MG/150ML
750 INJECTION, SOLUTION INTRAVENOUS ONCE
Status: COMPLETED | OUTPATIENT
Start: 2024-08-09 | End: 2024-08-09

## 2024-08-09 ASSESSMENT — PAIN - FUNCTIONAL ASSESSMENT
PAIN_FUNCTIONAL_ASSESSMENT: 0-10

## 2024-08-09 ASSESSMENT — PAIN SCALES - GENERAL
PAINLEVEL_OUTOF10: 8
PAINLEVEL_OUTOF10: 5 - MODERATE PAIN
PAINLEVEL_OUTOF10: 0 - NO PAIN

## 2024-08-09 ASSESSMENT — COGNITIVE AND FUNCTIONAL STATUS - GENERAL
DAILY ACTIVITIY SCORE: 8
TOILETING: TOTAL
STANDING UP FROM CHAIR USING ARMS: TOTAL
DRESSING REGULAR UPPER BODY CLOTHING: TOTAL
PERSONAL GROOMING: TOTAL
MOVING TO AND FROM BED TO CHAIR: TOTAL
EATING MEALS: A LITTLE
MOVING FROM LYING ON BACK TO SITTING ON SIDE OF FLAT BED WITH BEDRAILS: TOTAL
MOBILITY SCORE: 6
HELP NEEDED FOR BATHING: TOTAL
CLIMB 3 TO 5 STEPS WITH RAILING: TOTAL
WALKING IN HOSPITAL ROOM: TOTAL
DRESSING REGULAR LOWER BODY CLOTHING: TOTAL
TURNING FROM BACK TO SIDE WHILE IN FLAT BAD: TOTAL

## 2024-08-09 ASSESSMENT — PAIN SCALES - WONG BAKER: WONGBAKER_NUMERICALRESPONSE: NO HURT

## 2024-08-09 NOTE — PROGRESS NOTES
Music Therapy Note        Therapy Session  Referral Type: New referral this admission  Visit Type: Follow-up visit  Session Start Time: 1348  Intervention Delivery: In-person  Conflict of Service: Declined treatment  Family Present for Session: None               Treatment/Interventions            Narrative  Assessment Detail: Found pt resting in bed, no family present. Declined mt session. Music may be a trigger for him at this time.  Follow-up: MT will follow peripherally in case pt changes his mind.    Education Documentation  No documentation found.

## 2024-08-09 NOTE — PROGRESS NOTES
Edwar Hemphill is a 39 y.o. male on day 18 of admission.   Medical history significant for anemia of chronic disease, ESRD on HD via right femoral line, HFrEF, multiple line-related DVTs (BIJ, BUE, BLE treated with Eliquis prior to recent upper GIB 6/15/24), NICM, paroxsymal a-fib, PFO, PVD (s/p left AKA, right forearm amputation), T2DM, tobacco use.   Admitted to MICU for treatment of septic shock due to finding of infective endocarditis with TV vegetation due to HD line infection (line in right femoral).      Vascular Medicine Service initially consulted 7/23/24 for anticoagulation recommendations given recurrent line-related VTEs.  Most recent VTE 4/2024 (bilateral subclavian, LIJ DVT, bilateral innominate occlusion, complete occlusion of intrahepatic IVC s/p angioplasty).  Previously treated with Eliquis 5mg BID until experiencing UGIB 6/15/24 with admission to [facility] for treatment.  Patient not a candidate for IVCF due to right femoral HD line. Heparin infusion started 8/2/24.  Vascular Medicine Service re-consulted for home going anticoagulation recommendations.   Patient with continued thrombocytopenia, and HIT has been ruled out.   Continues with Heparin infusion as bridge to Warfarin which was started 8/8/24.      Subjective   Patient reports that he does not feel well today.  Currently undergoing bedside hemodialysis treatment.  Denies CP, SOB or bleeding.      Objective   Heparin infusion stopped temporarily via right femoral line, for hemodialysis therapy.  No overt signs of bleeding.     Physical Exam  Ill-appearing middle aged male, resting in bed in NAD; mother at bedside  Respirations full and easy; breath sounds are CTA in bilateral upper lobes; on RA  Normal heart sounds with regular rate/rhythm; cardiac monitor shows NSR; vital signs are stable  Abdomen soft and nontender to palpation  Extremities are warm to touch: left arm and right leg with palpable radial/DP pulse; patient s/p amputation of right  "arm and left AKA; Kerlix wrap remains in place to the left forearm.  Right femoral line in place with patient currently undergoing hemodialysis treatment; IV in the left neck with dressing dry and intact, and capped as it is not in use.  Patient is dxrowsy, and briefly participates in conversation; flat affect, and falls asleep quickly when conversation has ended.     Last Recorded Vitals  Blood pressure 93/60, pulse 87, temperature 35.9 °C (96.6 °F), resp. rate 12, height 1.753 m (5' 9.02\"), weight 72.9 kg (160 lb 11.5 oz), SpO2 92%.  Intake/Output last 3 Shifts:  I/O last 3 completed shifts:  In: 705.8 (10 mL/kg) [I.V.:705.8 (10 mL/kg)]  Out: 0 (0 mL/kg)   Dosing Weight: 70.9 kg     Relevant Results  Scheduled medications  atorvastatin, 40 mg, oral, Nightly  B complex-vitamin C-folic acid, 1 capsule, oral, Daily  bisacodyl, 10 mg, rectal, Daily  cholecalciferol, 50,000 Units, oral, Once per day on Monday Thursday  epoetin tyson or biosimilar, 10,000 Units, subcutaneous, Once per day on Monday Wednesday Friday  folic acid, 1 mg, oral, Daily  insulin glargine, 8 Units, subcutaneous, Nightly  insulin lispro, 0-15 Units, subcutaneous, TID  levothyroxine, 150 mcg, oral, Daily before breakfast  melatonin, 6 mg, oral, Nightly  midodrine, 10 mg, oral, BID  midodrine, 15 mg, oral, Nightly  nystatin, 1 Application, Topical, BID  pantoprazole, 40 mg, oral, BID AC  [START ON 9/6/2024] pantoprazole, 40 mg, oral, Daily before breakfast  polyethylene glycol, 17 g, oral, BID  sennosides-docusate sodium, 1 tablet, oral, BID  thiamine, 200 mg, oral, Daily  vancomycin, 750 mg, intravenous, Once  warfarin, 5 mg, oral, Daily      Continuous medications  heparin, 0-4,500 Units/hr, Last Rate: 1,700 Units/hr (08/09/24 0445)      Results from last 7 days   Lab Units 08/09/24  0316 08/08/24  0355 08/07/24  0437   WBC AUTO x10*3/uL 9.2 13.8* 15.5*   HEMOGLOBIN g/dL 7.5* 7.8* 8.2*   HEMATOCRIT % 24.5* 23.3* 26.5*   PLATELETS AUTO x10*3/uL 91* " 67* 64*       Results from last 7 days   Lab Units 08/09/24  0316 08/08/24  0355 08/07/24  0437   SODIUM mmol/L 131* 133* 131*   POTASSIUM mmol/L 4.6 4.1 4.9   CHLORIDE mmol/L 95* 96* 94*   CO2 mmol/L 25 28 26   BUN mg/dL 25* 22 36*   CREATININE mg/dL 4.09* 3.85* 5.01*   GLUCOSE mg/dL 121* 68* 150*   CALCIUM mg/dL 9.1 8.9 9.1       Results from last 7 days   Lab Units 08/09/24  0316 08/08/24  0355   INR  1.2* 1.3*       Results from last 7 days   Lab Units 08/09/24  0316 08/08/24  0355 08/07/24  0539   ANTI XA UNFRACTIONATED IU/mL 0.3 0.3 0.4        Assessment/Plan   Principal Problem:    Septic shock (Multi)  Active Problems:    Acute infective endocarditis (HHS-HCC)    Tricuspid valve vegetation (HHS-HCC)    Staphylococcal arthritis of left wrist (Multi)    Infection of left wrist (Multi)    39 year old male with complex medical history, admitted for finding of infective endocarditis due to HD line infection, complicated by septic shock.  Vascular Medicine Service following for anticoagulation recommendations for chronic thrombosis of IVC, SVC, bilateral brachiocephalic, subclavian, internal jugular veins, and chronic changes noted in the left CFV and proximal femoral veins.    Continues with Heparin infusion with therapeutic assay 0.3.  Heparin infusion has remained stable since initiation 8/2/24. Started bridge to Warfarin 8/8/24.   Review of labs shows stable hemoglobin 7.5 grams, continued thrombocytopenia with stable platelets 91K without evidence of bleeding, and creatinine of 4.09 reflecting known ESRD.  Undergoing hemodialysis treatment today.    Ideally patient will continue with Heparin infusion as bridge to Warfarin without need for stopping/starting due to limited IV access.   Discussion with patients mother: Continues with Heparin infusion as bridge to Warfarin.      Date    INR     Warfarin Dose     Comments   8/8      1.3           5mg                Baseline INR; Start bridge to Warfarin  8/9       "1.2           5mg     Recommendations:  ~Please determine if the IV in the left neck can be used for Heparin infusion, so that Heparin would not need to be stopped for hemodialysis therapy.   ~CONTINUE Heparin infusion; follow nomogram to achieve therapeutic assay 0.3-0.7  ~CONTINUE Warfarin 5mg this evening.  ~MONITOR for bleeding  ~Daily labs: CBC (monitor hg and plts), Heparin assay, INR  ~Will need a minimum of 5 days of overlap therapy with Heparin and Warfarin; goal INR is 2-3; when INR is >2 for 2 days in a row, Heparin can be stopped   ~Will need INR monitoring at [facility] 2 times a week, with\"as needed\" Warfarin dose adjustment to maintain INR goal 2-3. When patient discharged to home from SNF, will need outpatient Coumadin Clinic arranged prior to SNF discharge with initial appointment scheduled for 1-2 days after discharge.      Plan of care discussed with Dr. Nhung Carrillo  Plan of care discussed in person with Ms. Melanie ROBLERO from the [facility] SDU Team     OSBALDO Richardson  Vascular Medicine Service   Pager [phone]             "

## 2024-08-09 NOTE — PROGRESS NOTES
Subjective     Interval History: Edwar Hemphill    Patient seen on dialysis, no complaints          Current Facility-Administered Medications:     acetaminophen (Tylenol) tablet 650 mg, 650 mg, oral, q4h PRN, 650 mg at 08/02/24 2059 **OR** [DISCONTINUED] acetaminophen (Tylenol) oral liquid 650 mg, 650 mg, nasogastric tube, q4h PRN **OR** [DISCONTINUED] acetaminophen (Tylenol) suppository 650 mg, 650 mg, rectal, q4h PRN, Blaine Gonzalez MD    atorvastatin (Lipitor) tablet 40 mg, 40 mg, oral, Nightly, Alex Caraballo MD, 40 mg at 08/08/24 2057    B complex-vitamin C-folic acid (Nephrocaps) capsule 1 capsule, 1 capsule, oral, Daily, Alex Caraballo MD, 1 capsule at 08/09/24 1037    bisacodyl (Dulcolax) suppository 10 mg, 10 mg, rectal, Daily, Lupe Lopez, APRN-CNP, 10 mg at 08/09/24 1037    cholecalciferol (Vitamin D-3) capsule 50,000 Units, 50,000 Units, oral, Once per day on Monday Thursday, Alex Caraballo MD, 50,000 Units at 08/08/24 0830    dextrose 50 % injection 12.5 g, 12.5 g, intravenous, q15 min PRN, Alex Caraballo MD, 12.5 g at 08/02/24 1605    dextrose 50 % injection 25 g, 25 g, intravenous, q15 min PRN, Alex Caraballo MD, 25 g at 07/31/24 1730    diphenhydrAMINE (BENADryl) capsule 25 mg, 25 mg, oral, q6h PRN, Alex Caraballo MD, 25 mg at 07/24/24 1525    epoetin tyson (Epogen,Procrit) injection 10,000 Units, 10,000 Units, subcutaneous, Once per day on Monday Wednesday Friday, Alex Caraballo MD, 10,000 Units at 08/09/24 1042    folic acid (Folvite) tablet 1 mg, 1 mg, oral, Daily, Alex Caraballo MD, 1 mg at 08/09/24 1037    glucagon (Glucagen) injection 1 mg, 1 mg, intramuscular, q15 min PRN, Alex Caraballo MD    glucagon (Glucagen) injection 1 mg, 1 mg, intramuscular, q15 min PRN, Alex Caraballo MD    heparin 25,000 Units in dextrose 5% 250 mL (100 Units/mL) infusion (premix), 0-4,500 Units/hr, intravenous, Continuous, Alex Caraballo MD, Last Rate: 17 mL/hr at 08/09/24 0445, 1,700 Units/hr at 08/09/24 0445     HYDROmorphone (Dilaudid) injection 0.1 mg, 0.1 mg, intravenous, q3h PRN, Alex Caraballo MD, 0.1 mg at 08/08/24 1130    insulin glargine (Lantus) injection 8 Units, 8 Units, subcutaneous, Nightly, Alex Caraballo MD, 8 Units at 08/05/24 2101    insulin lispro (HumaLOG) injection 0-15 Units, 0-15 Units, subcutaneous, TID, Alex Caraballo MD    levothyroxine (Synthroid, Levoxyl) tablet 150 mcg, 150 mcg, oral, Daily before breakfast, Alex Caraballo MD, 150 mcg at 08/09/24 0615    melatonin tablet 6 mg, 6 mg, oral, Nightly, Alex Caraballo MD, 6 mg at 08/08/24 2057    midodrine (Proamatine) tablet 10 mg, 10 mg, oral, BID, OSBALDO Potter, 10 mg at 08/09/24 1259    midodrine (Proamatine) tablet 15 mg, 15 mg, oral, Nightly, OSBALDO Potter, 15 mg at 08/08/24 2112    nystatin (Mycostatin) 100,000 unit/gram powder 1 Application, 1 Application, Topical, BID, Alex Caraballo MD, 1 Application at 08/09/24 1038    ondansetron (Zofran) injection 4 mg, 4 mg, intravenous, q6h PRN, Gerardo Foreman MD, 4 mg at 08/07/24 0838    oxyCODONE (Roxicodone) immediate release tablet 5 mg, 5 mg, oral, q4h PRN, Alex Caraballo MD, 5 mg at 08/09/24 1347    pantoprazole (ProtoNix) EC tablet 40 mg, 40 mg, oral, BID AC, OSBALDO Potter, 40 mg at 08/09/24 1616    [START ON 9/6/2024] pantoprazole (ProtoNix) EC tablet 40 mg, 40 mg, oral, Daily before breakfast, OSBALDO Potter    polyethylene glycol (Glycolax, Miralax) packet 17 g, 17 g, oral, BID, Delvin Gomez, APRN-CNP, 17 g at 08/09/24 1037    sennosides-docusate sodium (Aracelis-Colace) 8.6-50 mg per tablet 1 tablet, 1 tablet, oral, BID, Lupe Lopez, APRN-CNP, 1 tablet at 08/09/24 1037    thiamine (Vitamin B-1) tablet 200 mg, 200 mg, oral, Daily, Alex Caraballo MD, 200 mg at 08/09/24 1037    vancomycin (Vancocin) pharmacy to dose - pharmacy monitoring, , miscellaneous, Daily PRN, Alex Caraballo MD    vancomycin 5 mg/mL + heparin 2500 units/mL in NS  lock, 2 mL, intra-catheter, q48h, Wendy Pérez, APRN-CNP, 2 mL at 08/09/24 1616    warfarin (Coumadin) tablet 5 mg, 5 mg, oral, Daily, Jacquelin Thurman, APRN-CNP, 5 mg at 08/08/24 1811    Physical Exam  Physical Exam  Heart S1 S2 RRR, Lungs CTA, no edema      Vital signs in last 24 hours:  Temp:  [35.5 °C (95.9 °F)-36.8 °C (98.2 °F)] 36.1 °C (97 °F)  Heart Rate:  [80-95] 89  Resp:  [10-17] 15  BP: (82-97)/(43-70) 82/43         Labs:  Results from last 7 days   Lab Units 08/09/24  0316   WBC AUTO x10*3/uL 9.2   RBC AUTO x10*6/uL 2.53*   HEMOGLOBIN g/dL 7.5*   HEMATOCRIT % 24.5*     Results from last 7 days   Lab Units 08/09/24  0316   SODIUM mmol/L 131*   POTASSIUM mmol/L 4.6   CHLORIDE mmol/L 95*   CO2 mmol/L 25   BUN mg/dL 25*   CREATININE mg/dL 4.09*   CALCIUM mg/dL 9.1   PHOSPHORUS mg/dL 5.1*   MAGNESIUM mg/dL 1.95   BILIRUBIN TOTAL mg/dL 0.9   ALT U/L 10   AST U/L 10            Assessment/Plan   Patient seen and examined while on dialysis, recent events, labs, medications reviewed. Will follow overall management per primary team, and continue regular dialysis.    Principal Problem:    Septic shock (Multi)  Active Problems:    Acute infective endocarditis (HHS-HCC)    Tricuspid valve vegetation (HHS-HCC)    Staphylococcal arthritis of left wrist (Multi)    Infection of left wrist (Multi)    Patient gets dialysis at [facility], will communicate to that unit about vanco locks for dialysis catheter at time of discharge    Karishma Mcmanus MD  8/9/2024  4:30 PM

## 2024-08-09 NOTE — PROGRESS NOTES
"Medical Intensive Care Stepdown Unit - Daily Progress Note   Subjective    Edwar Hemphill is a 39 y.o. year old male patient admitted on 7/22/2024 with septic shock    Interval History:  No events overnight    Meds    Scheduled medications  atorvastatin, 40 mg, oral, Nightly  B complex-vitamin C-folic acid, 1 capsule, oral, Daily  bisacodyl, 10 mg, rectal, Daily  cholecalciferol, 50,000 Units, oral, Once per day on Monday Thursday  epoetin tyson or biosimilar, 10,000 Units, subcutaneous, Once per day on Monday Wednesday Friday  folic acid, 1 mg, oral, Daily  insulin glargine, 8 Units, subcutaneous, Nightly  insulin lispro, 0-15 Units, subcutaneous, TID  levothyroxine, 150 mcg, oral, Daily before breakfast  melatonin, 6 mg, oral, Nightly  midodrine, 10 mg, oral, BID  midodrine, 15 mg, oral, Nightly  nystatin, 1 Application, Topical, BID  pantoprazole, 40 mg, oral, BID AC  [START ON 9/6/2024] pantoprazole, 40 mg, oral, Daily before breakfast  polyethylene glycol, 17 g, oral, BID  sennosides-docusate sodium, 1 tablet, oral, BID  thiamine, 200 mg, oral, Daily  warfarin, 5 mg, oral, Daily      Continuous medications  heparin, 0-4,500 Units/hr, Last Rate: 1,700 Units/hr (08/09/24 0445)      PRN medications  PRN medications: acetaminophen **OR** [DISCONTINUED] acetaminophen **OR** [DISCONTINUED] acetaminophen, dextrose, dextrose, diphenhydrAMINE, glucagon, glucagon, heparin, heparin, HYDROmorphone, lidocaine, ondansetron, oxyCODONE, vancomycin     Objective    Blood pressure 91/59, pulse 84, temperature 36.4 °C (97.5 °F), resp. rate 11, height 1.753 m (5' 9.02\"), weight 72.9 kg (160 lb 11.5 oz), SpO2 94%.     Constitutional: pt in NAD, alert and cooperative  Eyes: PERRL, EOMI, no icterus   ENMT: mucous membranes moist, no apparent injury, no lesions seen  Head/Neck: Neck supple, no apparent injury  Respiratory/Thorax: Lungs CTA bilaterally, non-labored breathing, no cough, on RA  Cardiovascular: Regular, rate and rhythm, no " murmurs, normal S1 and S2  Gastrointestinal: Nondistended, soft, non-tender, BS present x 4  Musculoskeletal: RUE amputation (healed), L AKA (healed)  Extremities: no edema, contusions or wounds  Neurological: alert and oriented x 3, speech clear, follows commands appropriately, cr. n. II-XII intact, sensation grossly intact, motor 5/5 throughout  Skin: Warm and dry, R fem HD line with CHG dressing      Intake/Output Summary (Last 24 hours) at 8/9/2024 0736  Last data filed at 8/9/2024 0445  Gross per 24 hour   Intake 420.75 ml   Output 0 ml   Net 420.75 ml     Labs:   Results from last 72 hours   Lab Units 08/09/24  0316 08/08/24  0355 08/07/24  0437   SODIUM mmol/L 131* 133* 131*   POTASSIUM mmol/L 4.6 4.1 4.9   CHLORIDE mmol/L 95* 96* 94*   CO2 mmol/L 25 28 26   BUN mg/dL 25* 22 36*   CREATININE mg/dL 4.09* 3.85* 5.01*   GLUCOSE mg/dL 121* 68* 150*   CALCIUM mg/dL 9.1 8.9 9.1   ANION GAP mmol/L 16 13 16   EGFR mL/min/1.73m*2 18* 19* 14*   PHOSPHORUS mg/dL 5.1* 4.6 5.2*      Results from last 72 hours   Lab Units 08/09/24 0316 08/08/24  0355 08/07/24  0437   WBC AUTO x10*3/uL 9.2 13.8* 15.5*   HEMOGLOBIN g/dL 7.5* 7.8* 8.2*   HEMATOCRIT % 24.5* 23.3* 26.5*   PLATELETS AUTO x10*3/uL 91* 67* 64*   NEUTROS PCT AUTO % 74.9  --  87.9   LYMPHO PCT MAN %  --  5.2  --    LYMPHS PCT AUTO % 13.0  --  5.8   MONO PCT MAN %  --  2.6  --    MONOS PCT AUTO % 9.3  --  5.2   EOSINO PCT MAN %  --  0.0  --    EOS PCT AUTO % 0.8  --  0.1        Micro/ID:     Lab Results   Component Value Date    BLOODCULT No growth at 4 days -  FINAL REPORT 07/31/2024    BLOODCULT No growth at 4 days -  FINAL REPORT 07/31/2024       Summary of key imaging results from the last 24 hours  8/7 KUB:  Impression:     1.  No gross bowel distention. Moderate amount of stool within the  colon and correlate with a component of fecal impaction.           Assessment and Plan     Assessment: Edwar Hemphill is a 39 y.o. year old male patient with medical history  "of ESRD on HD (MWF, right femoral line), HFrEF (EF 35-40% 5/2024), T2DM, PAD s/p L AKA and R above elbow amputation with multiple hospitalizations for L AKA infection and MRSA bacteremia  admitted on 7/22/2024 with septic shock    Restraints: no    Summary for 08/09/24  :  Tolerated iHD today  Unable to complete CT angio head to rule out mycotic aneurysm 2/2 access and need for power injection for contrast.  Will obtain MRI, \"time of flight\" imaging    Plan:  NEUROLOGY/PSYCH:  Dx: Acute left wrist pain   Management:  Pain reg: tylenol PRN, oxycodone prn, and Dilaudid prn   Melatonin HS   PT/OT/OOB    CARDIOVASCULAR:  Dx: Infective endocarditis with TV vegetation, Severely attenuated superior vena cava, bilateral brachiocephalic, subclavian and internal jugular veins with the calcifications in the brachiocephalic veins with extensive collaterals in the chest wall likely representing chronic thrombosis.  Septic shock, off pressors since 8/4.  Persistent hypotension (asymptomatic).  Hx of HLD  Management:  Home Meds: Atorvastatin 40 mg daily, Midodrine 10 mg QID  Patient had vegectomy aborted due to to concerns for infected thrombus in the IVC.   Continue Atorvastatin 40 mg daily  Midodrine decreased from 15mg Q8hrs to 10mg BID during day and 15mg HS    PULMONARY:  Dx:No acute issues   Management:  Stable on RA    RENAL/GENITOURINARY:  Dx: Hx ESRD on iHD MWF   Management:  HD today via R Fem HD line    GASTROENTEROLOGY:  Dx: Hx of GERD, Esophagitis, Recent GIB (6/2024) s/p clipping x2  Management:  Diet: Regular  Bowel Regimen: Dulcolax suppository daily, Aracelis-colace BID, and Miralax BID   GI recommending BID PPI x1 month then switch to daily indefinitely. Will need to follow up with Dr. No Kimball as O/p     ENDOCRINOLOGY:  Dx: DM, Hypothyroid  Management:  A1C: 6.8 3(3/2024)  Blood sugars   Lantus 8u HS and SSI TID   Continue Levothyroxine 150mg daily. Re-check TSH on 8/12 (ordered)     HEMATOLOGY:  Dx: Anema " of chronic disease, Thrombocytopenia, Chronic thrombosis of IVC, SVC, blanca brachiocephalic, Subclavian, internal jugular and chronic changes in L CFV & Prox fem veins  required 2 U pRBC on 7/24 and 7/29.   Started on heparin drip 7/31  Management:  Vascular Medicine consulted: Okay to begin bridge to coumadin, still need to r/o Mycotic aneurysm. CT angio head ordered. If Mycotic aneurysms noted will need to have further discussion regarding anticoagulation   Continue Coumadin 5 mg and Heparin drip, switch to injection PIV    MUSCULOSKELETAL/ SKIN:  Dx: No acute issues      INFECTIOUS DISEASE:  Dx: MRSA bacteremia, Infective Endocarditis (TV),  septic shock (resolved)  Management:  Tmax: 36.8 C  MICRO: 7/22 and 7/25 BC with MRSA, 7/27 BC Staph Haemolyticus; 7/27 synovial fluid from Left wrist MRSA, BC 7/29 and 7/31 Neg, 7/29 Fungal cx (tissue) pending, Bacterial cx neg  Antimicrobials: Continue vancomycin post-HD through end date 9/13   Will instill vanc into HD cath after each HD session  Will trial indefinite suppression with Bactrim 1SS daily after vancomycin course (ID follow up on 9/10)   Follow up with ID, Dr Doroteo Graham 9/10     ICU/SDU Check List                  FEN  Fluids: None  Electrolytes: PRN  Nutrition: Regular  Prophylaxis:  DVT ppx: SCDs  GI ppx: PPI BID  Hardware:  Catheter: None  Drains: None  Lines: R Fem HD cath, Left EJ PIV  Social:  Code: Full Code    HPOA: Shanthi Hemphill, Parent, [phone]     Disposition: Continue SDU care for tx of Endocarditis & DVT tx with heparin bridge to coumadin  Discharge Planning: from [facility]    AMARILYS Sheikh-CNP   08/09/24 at 7:36 AM     Pt discussed with Dr. Allen, seen and examined. All labs, VS and previous plan of care reviewed.  I spent 45 minutes in the professional and overall care of this patient.      Disclaimer: Documentation completed with the information available at the time of input. The times in the chart may not be reflective  of actual patient care times, interventions, or procedures. Documentation occurs after the physical care of the patient.

## 2024-08-09 NOTE — PROGRESS NOTES
Art Therapy Note    Edwar Hemphill     Therapy Session  Referral Type: New referral this admission  Visit Type: New visit  Session Start Time: 1419  Session End Time: 1420  Intervention Delivery: In-person  Conflict of Service: Working with other staff  Number of family members present: 1              Treatment/Interventions  Art Therapy Interventions: Assessment    Post-assessment  Total Session Time (min): 1 minutes    Narrative  Assessment Detail: ATR made visit to Pt.s room to folllow up on areferrla made and to introduce services.  Staff entering room to reposition Pt and provide personal care.  ATR will follow up [facility] pt another time to revisit services.    Education Documentation  No documentation found.

## 2024-08-09 NOTE — PROGRESS NOTES
Vancomycin Dosing by Pharmacy- FOLLOW UP    Edwar Hemphill is a 39 y.o. year old male who Pharmacy has been consulted for vancomycin dosing for other bacteremia . Based on the patient's indication and renal status this patient is being dosed based on a goal pre-HD level of 20-25.     HD - dose by level.     Visit Vitals  BP 93/60   Pulse 87   Temp 35.9 °C (96.6 °F)   Resp 12        Lab Results   Component Value Date    CREATININE 4.09 (H) 2024    CREATININE 3.85 (H) 2024    CREATININE 5.01 (H) 2024    CREATININE 4.93 (H) 2024        Patient weight is as follows:   Vitals:    24 0600   Weight: 72.9 kg (160 lb 11.5 oz)       Cultures:  Susceptibility data for the encounter in last 14 days.  Collected Specimen Info Organism Clindamycin Erythromycin Oxacillin Tetracycline Trimethoprim/Sulfamethoxazole Vancomycin   24 Fluid from Synovial Methicillin Resistant Staphylococcus aureus (MRSA)  R  R  R  R  S  S   24 Blood culture from Peripheral Venipuncture Staphylococcus haemolyticus  R  R  R  S  R  S        I/O last 3 completed shifts:  In: 705.8 (10 mL/kg) [I.V.:705.8 (10 mL/kg)]  Out: 0 (0 mL/kg)   Dosing Weight: 70.9 kg   I/O during current shift:  No intake/output data recorded.    Temp (24hrs), Av.2 °C (97.1 °F), Min:35.5 °C (95.9 °F), Max:36.8 °C (98.2 °F)    Assessment/Plan     level = 12.6. Patient received HD on . Re-dose with 750 mg x once.   The next level will be obtained on 8/10 AM labs. May be obtained sooner if clinically indicated.   Will continue to monitor renal function daily while on vancomycin and order serum creatinine at least every 48 hours if not already ordered.  Follow for continued vancomycin needs, clinical response, and signs/symptoms of toxicity.     Cori Wright, [facility]

## 2024-08-10 LAB
ABO GROUP (TYPE) IN BLOOD: NORMAL
ALBUMIN SERPL BCP-MCNC: 2.3 G/DL (ref 3.4–5)
ALP SERPL-CCNC: 164 U/L (ref 33–120)
ALT SERPL W P-5'-P-CCNC: 8 U/L (ref 10–52)
ANION GAP BLDV CALCULATED.4IONS-SCNC: 21 MMOL/L (ref 10–25)
ANION GAP SERPL CALC-SCNC: 15 MMOL/L (ref 10–20)
ANION GAP SERPL CALC-SCNC: 24 MMOL/L (ref 10–20)
ANION GAP SERPL CALC-SCNC: 29 MMOL/L (ref 10–20)
ANTIBODY SCREEN: NORMAL
AST SERPL W P-5'-P-CCNC: 8 U/L (ref 9–39)
B-OH-BUTYR SERPL-SCNC: 7.03 MMOL/L (ref 0.02–0.27)
BASE EXCESS BLDV CALC-SCNC: -9.3 MMOL/L (ref -2–3)
BILIRUB DIRECT SERPL-MCNC: 0.3 MG/DL (ref 0–0.3)
BILIRUB SERPL-MCNC: 1 MG/DL (ref 0–1.2)
BODY TEMPERATURE: 37 DEGREES CELSIUS
BUN SERPL-MCNC: 17 MG/DL (ref 6–23)
BUN SERPL-MCNC: 18 MG/DL (ref 6–23)
BUN SERPL-MCNC: 19 MG/DL (ref 6–23)
CA-I BLDV-SCNC: 1.22 MMOL/L (ref 1.1–1.33)
CALCIUM SERPL-MCNC: 8.8 MG/DL (ref 8.6–10.6)
CALCIUM SERPL-MCNC: 8.9 MG/DL (ref 8.6–10.6)
CALCIUM SERPL-MCNC: 9.2 MG/DL (ref 8.6–10.6)
CHLORIDE BLDV-SCNC: 97 MMOL/L (ref 98–107)
CHLORIDE SERPL-SCNC: 94 MMOL/L (ref 98–107)
CO2 SERPL-SCNC: 15 MMOL/L (ref 21–32)
CO2 SERPL-SCNC: 18 MMOL/L (ref 21–32)
CO2 SERPL-SCNC: 25 MMOL/L (ref 21–32)
CREAT SERPL-MCNC: 2.87 MG/DL (ref 0.5–1.3)
CREAT SERPL-MCNC: 3.09 MG/DL (ref 0.5–1.3)
CREAT SERPL-MCNC: 3.54 MG/DL (ref 0.5–1.3)
EGFRCR SERPLBLD CKD-EPI 2021: 21 ML/MIN/1.73M*2
EGFRCR SERPLBLD CKD-EPI 2021: 25 ML/MIN/1.73M*2
EGFRCR SERPLBLD CKD-EPI 2021: 28 ML/MIN/1.73M*2
ERYTHROCYTE [DISTWIDTH] IN BLOOD BY AUTOMATED COUNT: 21 % (ref 11.5–14.5)
GLUCOSE BLD MANUAL STRIP-MCNC: 105 MG/DL (ref 74–99)
GLUCOSE BLD MANUAL STRIP-MCNC: 123 MG/DL (ref 74–99)
GLUCOSE BLD MANUAL STRIP-MCNC: 152 MG/DL (ref 74–99)
GLUCOSE BLD MANUAL STRIP-MCNC: 165 MG/DL (ref 74–99)
GLUCOSE BLD MANUAL STRIP-MCNC: 254 MG/DL (ref 74–99)
GLUCOSE BLD MANUAL STRIP-MCNC: 84 MG/DL (ref 74–99)
GLUCOSE BLD MANUAL STRIP-MCNC: 86 MG/DL (ref 74–99)
GLUCOSE BLD MANUAL STRIP-MCNC: 88 MG/DL (ref 74–99)
GLUCOSE BLDV-MCNC: 234 MG/DL (ref 74–99)
GLUCOSE SERPL-MCNC: 107 MG/DL (ref 74–99)
GLUCOSE SERPL-MCNC: 206 MG/DL (ref 74–99)
GLUCOSE SERPL-MCNC: 234 MG/DL (ref 74–99)
HCO3 BLDV-SCNC: 16.1 MMOL/L (ref 22–26)
HCT VFR BLD AUTO: 22.3 % (ref 41–52)
HCT VFR BLD EST: 24 % (ref 41–52)
HGB BLD-MCNC: 7.2 G/DL (ref 13.5–17.5)
HGB BLDV-MCNC: 8.1 G/DL (ref 13.5–17.5)
INHALED O2 CONCENTRATION: 21 %
INR PPP: 1.7 (ref 0.9–1.1)
LACTATE BLDV-SCNC: 1.6 MMOL/L (ref 0.4–2)
LACTATE SERPL-SCNC: 1 MMOL/L (ref 0.4–2)
MAGNESIUM SERPL-MCNC: 1.96 MG/DL (ref 1.6–2.4)
MCH RBC QN AUTO: 30.1 PG (ref 26–34)
MCHC RBC AUTO-ENTMCNC: 32.3 G/DL (ref 32–36)
MCV RBC AUTO: 93 FL (ref 80–100)
NRBC BLD-RTO: 0 /100 WBCS (ref 0–0)
OXYHGB MFR BLDV: 52.5 % (ref 45–75)
PCO2 BLDV: 32 MM HG (ref 41–51)
PH BLDV: 7.31 PH (ref 7.33–7.43)
PHOSPHATE SERPL-MCNC: 4.2 MG/DL (ref 2.5–4.9)
PHOSPHATE SERPL-MCNC: 4.4 MG/DL (ref 2.5–4.9)
PHOSPHATE SERPL-MCNC: 4.7 MG/DL (ref 2.5–4.9)
PLATELET # BLD AUTO: 103 X10*3/UL (ref 150–450)
PO2 BLDV: 37 MM HG (ref 35–45)
POTASSIUM BLDV-SCNC: 4.1 MMOL/L (ref 3.5–5.3)
POTASSIUM SERPL-SCNC: 4 MMOL/L (ref 3.5–5.3)
POTASSIUM SERPL-SCNC: 4.2 MMOL/L (ref 3.5–5.3)
POTASSIUM SERPL-SCNC: 4.4 MMOL/L (ref 3.5–5.3)
PROT SERPL-MCNC: 5.4 G/DL (ref 6.4–8.2)
PROTHROMBIN TIME: 19.5 SECONDS (ref 9.8–12.8)
RBC # BLD AUTO: 2.39 X10*6/UL (ref 4.5–5.9)
RH FACTOR (ANTIGEN D): NORMAL
SAO2 % BLDV: 54 % (ref 45–75)
SODIUM BLDV-SCNC: 130 MMOL/L (ref 136–145)
SODIUM SERPL-SCNC: 130 MMOL/L (ref 136–145)
SODIUM SERPL-SCNC: 132 MMOL/L (ref 136–145)
SODIUM SERPL-SCNC: 134 MMOL/L (ref 136–145)
UFH PPP CHRO-ACNC: 0.3 IU/ML
UFH PPP CHRO-ACNC: 0.4 IU/ML
UFH PPP CHRO-ACNC: 0.9 IU/ML
VANCOMYCIN SERPL-MCNC: 16.1 UG/ML (ref 5–20)
WBC # BLD AUTO: 9.1 X10*3/UL (ref 4.4–11.3)

## 2024-08-10 PROCEDURE — 85610 PROTHROMBIN TIME: CPT | Performed by: NURSE PRACTITIONER

## 2024-08-10 PROCEDURE — 82010 KETONE BODYS QUAN: CPT | Performed by: NURSE PRACTITIONER

## 2024-08-10 PROCEDURE — 36415 COLL VENOUS BLD VENIPUNCTURE: CPT

## 2024-08-10 PROCEDURE — 2500000001 HC RX 250 WO HCPCS SELF ADMINISTERED DRUGS (ALT 637 FOR MEDICARE OP)

## 2024-08-10 PROCEDURE — 85520 HEPARIN ASSAY: CPT

## 2024-08-10 PROCEDURE — 99232 SBSQ HOSP IP/OBS MODERATE 35: CPT | Performed by: INTERNAL MEDICINE

## 2024-08-10 PROCEDURE — 84132 ASSAY OF SERUM POTASSIUM: CPT | Performed by: NURSE PRACTITIONER

## 2024-08-10 PROCEDURE — 82947 ASSAY GLUCOSE BLOOD QUANT: CPT

## 2024-08-10 PROCEDURE — 2500000004 HC RX 250 GENERAL PHARMACY W/ HCPCS (ALT 636 FOR OP/ED)

## 2024-08-10 PROCEDURE — 85027 COMPLETE CBC AUTOMATED: CPT | Performed by: NURSE PRACTITIONER

## 2024-08-10 PROCEDURE — 83735 ASSAY OF MAGNESIUM: CPT

## 2024-08-10 PROCEDURE — 2500000002 HC RX 250 W HCPCS SELF ADMINISTERED DRUGS (ALT 637 FOR MEDICARE OP, ALT 636 FOR OP/ED): Performed by: NURSE PRACTITIONER

## 2024-08-10 PROCEDURE — 86923 COMPATIBILITY TEST ELECTRIC: CPT

## 2024-08-10 PROCEDURE — 83605 ASSAY OF LACTIC ACID: CPT | Performed by: NURSE PRACTITIONER

## 2024-08-10 PROCEDURE — 84075 ASSAY ALKALINE PHOSPHATASE: CPT | Performed by: NURSE PRACTITIONER

## 2024-08-10 PROCEDURE — 2500000002 HC RX 250 W HCPCS SELF ADMINISTERED DRUGS (ALT 637 FOR MEDICARE OP, ALT 636 FOR OP/ED)

## 2024-08-10 PROCEDURE — 2060000001 HC INTERMEDIATE ICU ROOM DAILY

## 2024-08-10 PROCEDURE — 80069 RENAL FUNCTION PANEL: CPT | Mod: CCI | Performed by: NURSE PRACTITIONER

## 2024-08-10 PROCEDURE — 80202 ASSAY OF VANCOMYCIN: CPT

## 2024-08-10 PROCEDURE — 2500000004 HC RX 250 GENERAL PHARMACY W/ HCPCS (ALT 636 FOR OP/ED): Performed by: NURSE PRACTITIONER

## 2024-08-10 PROCEDURE — 86901 BLOOD TYPING SEROLOGIC RH(D): CPT

## 2024-08-10 RX ORDER — VANCOMYCIN HYDROCHLORIDE 500 MG/100ML
500 INJECTION, SOLUTION INTRAVENOUS ONCE
Status: COMPLETED | OUTPATIENT
Start: 2024-08-10 | End: 2024-08-10

## 2024-08-10 RX ORDER — INSULIN GLARGINE 100 [IU]/ML
5 INJECTION, SOLUTION SUBCUTANEOUS EVERY 24 HOURS
Status: DISCONTINUED | OUTPATIENT
Start: 2024-08-10 | End: 2024-08-11

## 2024-08-10 RX ORDER — WARFARIN SODIUM 5 MG/1
2.5 TABLET ORAL DAILY
Status: DISCONTINUED | OUTPATIENT
Start: 2024-08-10 | End: 2024-08-12

## 2024-08-10 RX ORDER — DEXTROSE MONOHYDRATE 100 MG/ML
50 INJECTION, SOLUTION INTRAVENOUS CONTINUOUS
Status: DISCONTINUED | OUTPATIENT
Start: 2024-08-10 | End: 2024-08-11

## 2024-08-10 ASSESSMENT — PAIN SCALES - GENERAL
PAINLEVEL_OUTOF10: 0 - NO PAIN

## 2024-08-10 ASSESSMENT — COGNITIVE AND FUNCTIONAL STATUS - GENERAL
TOILETING: TOTAL
HELP NEEDED FOR BATHING: TOTAL
DAILY ACTIVITIY SCORE: 8
STANDING UP FROM CHAIR USING ARMS: TOTAL
MOVING TO AND FROM BED TO CHAIR: TOTAL
CLIMB 3 TO 5 STEPS WITH RAILING: TOTAL
DRESSING REGULAR UPPER BODY CLOTHING: TOTAL
MOBILITY SCORE: 6
DRESSING REGULAR LOWER BODY CLOTHING: TOTAL
WALKING IN HOSPITAL ROOM: TOTAL
TURNING FROM BACK TO SIDE WHILE IN FLAT BAD: TOTAL
PERSONAL GROOMING: TOTAL
EATING MEALS: A LITTLE
MOVING FROM LYING ON BACK TO SITTING ON SIDE OF FLAT BED WITH BEDRAILS: TOTAL

## 2024-08-10 ASSESSMENT — PAIN - FUNCTIONAL ASSESSMENT
PAIN_FUNCTIONAL_ASSESSMENT: 0-10

## 2024-08-10 NOTE — PROGRESS NOTES
Vancomycin Dosing by Pharmacy- FOLLOW UP    Edwar Hemphill is a 39 y.o. year old male who Pharmacy has been consulted for vancomycin dosing for other bacteremia . Based on the patient's indication and renal status this patient is being dosed based on a goal pre-HD level of 20-25.     Renal function is currently HD dependent.    Most recent random level: 16.1 mcg/mL    Visit Vitals  BP 87/53   Pulse 85   Temp 36.3 °C (97.3 °F) (Temporal)   Resp 13        Lab Results   Component Value Date    CREATININE 3.09 (H) 08/10/2024    CREATININE 2.87 (H) 08/10/2024    CREATININE 2.47 (H) 2024    CREATININE 4.09 (H) 2024        Patient weight is as follows:   Vitals:    24 0600   Weight: 72.9 kg (160 lb 11.5 oz)       Cultures:  Susceptibility data for the encounter in last 14 days.  Collected Specimen Info Organism Clindamycin Erythromycin Oxacillin Tetracycline Trimethoprim/Sulfamethoxazole Vancomycin   24 Fluid from Synovial Methicillin Resistant Staphylococcus aureus (MRSA)  R  R  R  R  S  S   24 Blood culture from Peripheral Venipuncture Staphylococcus haemolyticus  R  R  R  S  R  S        I/O last 3 completed shifts:  In: 1032 (14.6 mL/kg) [P.O.:60; I.V.:822 (11.6 mL/kg); IV Piggyback:150]  Out: 0 (0 mL/kg)   Dosing Weight: 70.9 kg   I/O during current shift:  I/O this shift:  In: 86 [I.V.:86]  Out: -     Temp (24hrs), Av.3 °C (97.4 °F), Min:36.1 °C (97 °F), Max:36.6 °C (97.9 °F)      Assessment/Plan    Below goal random/trough level. Will redose with 500mg x 1 on 8/10/24 at 1300.   The next level will be obtained as appropriate based on HD schedule. Awaiting nephrology recommendation.  May be obtained sooner if clinically indicated.   Will continue to monitor renal function daily while on vancomycin and order serum creatinine at least every 48 hours if not already ordered.  Follow for continued vancomycin needs, clinical response, and signs/symptoms of toxicity.       Giana Fuller, PharmD

## 2024-08-10 NOTE — PROGRESS NOTES
"Medical Intensive Care Stepdown Unit - Daily Progress Note   Subjective    Edwar Hemphill is a 39 y.o. year old male patient admitted on 7/22/2024 with septic shock    Interval History:  Tolerated iHD yesterday, post-HD, instilled Vanc/heparin into HD cath  Today, endorses L wrist pain  PO intake decreased x 2-3 days per nursing.  Per patient, \"no appetite\"    Meds    Scheduled medications  atorvastatin, 40 mg, oral, Nightly  B complex-vitamin C-folic acid, 1 capsule, oral, Daily  bisacodyl, 10 mg, rectal, Daily  cholecalciferol, 50,000 Units, oral, Once per day on Monday Thursday  epoetin tyson or biosimilar, 10,000 Units, subcutaneous, Once per day on Monday Wednesday Friday  folic acid, 1 mg, oral, Daily  insulin glargine, 8 Units, subcutaneous, Nightly  insulin lispro, 0-15 Units, subcutaneous, TID  levothyroxine, 150 mcg, oral, Daily before breakfast  melatonin, 6 mg, oral, Nightly  midodrine, 10 mg, oral, BID  midodrine, 15 mg, oral, Nightly  nystatin, 1 Application, Topical, BID  pantoprazole, 40 mg, oral, BID AC  [START ON 9/6/2024] pantoprazole, 40 mg, oral, Daily before breakfast  polyethylene glycol, 17 g, oral, BID  sennosides-docusate sodium, 1 tablet, oral, BID  thiamine, 200 mg, oral, Daily  vancomycin 5 mg/mL + heparin 2500 units/mL in NS lock, 2 mL, intra-catheter, q48h  warfarin, 5 mg, oral, Daily      Continuous medications  heparin, 0-4,500 Units/hr, Last Rate: 1,500 Units/hr (08/10/24 1051)      PRN medications  PRN medications: acetaminophen **OR** [DISCONTINUED] acetaminophen **OR** [DISCONTINUED] acetaminophen, dextrose, dextrose, diphenhydrAMINE, glucagon, glucagon, HYDROmorphone, ondansetron, oxyCODONE, vancomycin     Objective    Blood pressure 92/52, pulse 91, temperature 36.6 °C (97.9 °F), temperature source Temporal, resp. rate 13, height 1.753 m (5' 9.02\"), weight 72.9 kg (160 lb 11.5 oz), SpO2 95%.     Constitutional: pt in NAD, alert and cooperative  Eyes: PERRL, EOMI, no icterus "   ENMT: mucous membranes moist, no apparent injury, no lesions seen  Head/Neck: Neck supple, no apparent injury  Respiratory/Thorax: Lungs CTA bilaterally, non-labored breathing, no cough, on RA  Cardiovascular: Regular, rate and rhythm, no murmurs, normal S1 and S2  Gastrointestinal: Nondistended, soft, non-tender, BS present x 4  Musculoskeletal: RUE amputation (healed), L AKA (healed).  L wrist surgical site, tender to touch, surgical incision, well approximated, no signs of infection, redressed with petroleum gauze and kerlix  Extremities: no edema, contusions or wounds  Neurological: alert and oriented x 3, speech clear, follows commands appropriately, cr. n. II-XII intact, sensation grossly intact, motor 5/5 throughout  Skin: Warm and dry, R fem HD line with CHG dressing;       Intake/Output Summary (Last 24 hours) at 8/10/2024 0827  Last data filed at 8/10/2024 0421  Gross per 24 hour   Intake 611.2 ml   Output --   Net 611.2 ml     Labs:   Results from last 72 hours   Lab Units 08/10/24  0314 08/09/24  1625 08/09/24  0316   SODIUM mmol/L 134* 132* 131*   POTASSIUM mmol/L 4.4 4.1 4.6   CHLORIDE mmol/L 94* 95* 95*   CO2 mmol/L 15* 24 25   BUN mg/dL 17 14 25*   CREATININE mg/dL 2.87* 2.47* 4.09*   GLUCOSE mg/dL 206* 160* 121*   CALCIUM mg/dL 8.8 8.5* 9.1   ANION GAP mmol/L 29* 17 16   EGFR mL/min/1.73m*2 28* 33* 18*   PHOSPHORUS mg/dL 4.2 3.7 5.1*      Results from last 72 hours   Lab Units 08/10/24  0314 08/09/24  1625 08/09/24  0316 08/08/24  0355   WBC AUTO x10*3/uL 9.1 9.2 9.2 13.8*   HEMOGLOBIN g/dL 7.2* 7.2* 7.5* 7.8*   HEMATOCRIT % 22.3* 23.8* 24.5* 23.3*   PLATELETS AUTO x10*3/uL 103* 91* 91* 67*   NEUTROS PCT AUTO %  --   --  74.9  --    LYMPHO PCT MAN %  --   --   --  5.2   LYMPHS PCT AUTO %  --   --  13.0  --    MONO PCT MAN %  --   --   --  2.6   MONOS PCT AUTO %  --   --  9.3  --    EOSINO PCT MAN %  --   --   --  0.0   EOS PCT AUTO %  --   --  0.8  --         Micro/ID:     Lab Results   Component  "Value Date    BLOODCULT No growth at 4 days -  FINAL REPORT 07/31/2024    BLOODCULT No growth at 4 days -  FINAL REPORT 07/31/2024       Summary of key imaging results from the last 24 hours  8/7 KUB:  Impression:     1.  No gross bowel distention. Moderate amount of stool within the  colon and correlate with a component of fecal impaction.           Assessment and Plan     Assessment: Edwar Hemphill is a 39 y.o. year old male patient with medical history of ESRD on HD (MWF, right femoral line), HFrEF (EF 35-40% 5/2024), T2DM, PAD s/p L AKA and R above elbow amputation with multiple hospitalizations for L AKA infection and MRSA bacteremia  admitted on 7/22/2024 with septic shock    Restraints: no    Summary for 08/10/24  :  Tolerated iHD today  Unable to complete CT angio head to rule out mycotic aneurysm 2/2 access and need for power injection for contrast.  Will obtain MRI, \"time of flight\" imaging  New AG, starting tx of euvolemic DKA  Utilizing HD cath for infusions 2/2 poor vascular access     Plan:  NEUROLOGY/PSYCH:  Dx: Acute left wrist pain   Management:  Pain reg: tylenol PRN, oxycodone prn, and Dilaudid prn   Melatonin HS   PT/OT/OOB    CARDIOVASCULAR:  Dx: Infective endocarditis with TV vegetation, Severely attenuated superior vena cava, bilateral brachiocephalic, subclavian and internal jugular veins with the calcifications in the brachiocephalic veins with extensive collaterals in the chest wall likely representing chronic thrombosis.  Septic shock, off pressors since 8/4.  Persistent hypotension (asymptomatic).  Hx of HLD  Management:  Home Meds: Atorvastatin 40 mg daily, Midodrine 10 mg QID  Patient had vegectomy aborted due to to concerns for infected thrombus in the IVC.   Continue Atorvastatin 40 mg daily  Midodrine decreased from 15mg Q8hrs to 10mg BID during day and 15mg HS    PULMONARY:  Dx:No acute issues   Management:  Stable on RA    RENAL/GENITOURINARY:  Dx: Hx ESRD on iHD MWF " "  Management:  HD today via R Fem HD line    GASTROENTEROLOGY:  Dx: Hx of GERD, Esophagitis, Recent GIB (6/2024) s/p clipping x2. Constipation, resolving.    Poor PO intake  Management:  Diet: Regular  Bowel Regimen: Dulcolax suppository daily, Aracelis-colace BID, and Miralax BID   GI recommending BID PPI x1 month then switch to daily indefinitely. Will need to follow up with Dr. No Kimball as O/p     ENDOCRINOLOGY:  Dx: DM, Hypothyroid.  Euvolemic DKA  Management:  A1C: 6.8 3(3/2024)  Blood sugars 112-206.  Beta- Hydroxybutyrate 7.03  Lantus 8u HS and SSI TID ==> HOLD  Start Insulin drip and D10 infusion  Q6H RFP until AG closed  Continue Levothyroxine 150mg daily. Re-check TSH on 8/12 (ordered)     HEMATOLOGY:  Dx: Anema of chronic disease, Thrombocytopenia, Chronic thrombosis of IVC, SVC, blanca brachiocephalic, Subclavian, internal jugular and chronic changes in L CFV & Prox fem veins  required 2 U pRBC on 7/24 and 7/29.   Started on heparin drip 7/31  Management:  Vascular Medicine consulted: Okay to begin bridge to coumadin, still need to r/o Mycotic aneurysm. CT angio head ordered. If Mycotic aneurysms noted will need to have further discussion regarding anticoagulation.  Unable to complete CTA head 2/2 access issues.  Per discussion with Radiology, will obtain MRI \"time of flight\" procotol  Continue Coumadin 2.5 mg (decreased from 5 mg per vasc med) and Heparin drip via EJ PIV  Monitor for bleeding given recent GIB    MUSCULOSKELETAL/ SKIN:  Dx: L wrist septic arthritis, s/p I&D 7/29 with ortho  Contact Ortho on Monday to assess surgical wound (see Ortho note 7/29)    INFECTIOUS DISEASE:  Dx: MRSA bacteremia, Infective Endocarditis (TV),  L wrist septic arthritis s/p I&D (7/29), septic shock (resolved).  Hx of Acinetobacter  Management:  Tmax: 36.6 C, WBC 9.1  MICRO: 7/22 and 7/25 BC with MRSA, 7/27 BC Staph Haemolyticus; 7/27 synovial fluid from Left wrist MRSA, BC 7/29 and 7/31 Neg, 7/29 Fungal cx (tissue) " pending, Bacterial cx neg  Antimicrobials: Continue vancomycin post-HD through end date 9/13   Will instill vanc into HD cath after each HD session  Will trial indefinite suppression with Bactrim 1SS daily after vancomycin course (ID follow up on 9/10)   Follow up with ID, Dr Doroteo Graham 9/10     ICU/SDU Check List                  FEN  Fluids: None  Electrolytes: PRN  Nutrition: Regular  Prophylaxis:  DVT ppx: SCDs  GI ppx: PPI BID  Hardware:  Catheter: None  Drains: None  Lines: R Fem HD cath, Left EJ PIV  Social:  Code: Full Code    HPOA: Shanthi Hemphill, Parent, [phone]     Disposition: Continue SDU care for tx of Endocarditis & DVT tx with heparin bridge to coumadin  Discharge Planning: from [facility]    AMARILYS Sheikh-CNP   08/10/24 at 8:27 AM     Pt discussed with Dr. Medina, seen and examined. All labs, VS and previous plan of care reviewed.  I spent 45 minutes in the professional and overall care of this patient.      Disclaimer: Documentation completed with the information available at the time of input. The times in the chart may not be reflective of actual patient care times, interventions, or procedures. Documentation occurs after the physical care of the patient.

## 2024-08-10 NOTE — PROGRESS NOTES
Subjective     Interval History: Edwar Hemphill has no new complaints    Medications    Current Facility-Administered Medications:     acetaminophen (Tylenol) tablet 650 mg, 650 mg, oral, q4h PRN, 650 mg at 08/02/24 2059 **OR** [DISCONTINUED] acetaminophen (Tylenol) oral liquid 650 mg, 650 mg, nasogastric tube, q4h PRN **OR** [DISCONTINUED] acetaminophen (Tylenol) suppository 650 mg, 650 mg, rectal, q4h PRN, Blaine Gonzalez MD    atorvastatin (Lipitor) tablet 40 mg, 40 mg, oral, Nightly, Alex Caraballo MD, 40 mg at 08/09/24 2101    B complex-vitamin C-folic acid (Nephrocaps) capsule 1 capsule, 1 capsule, oral, Daily, Alex Caraballo MD, 1 capsule at 08/10/24 0858    bisacodyl (Dulcolax) suppository 10 mg, 10 mg, rectal, Daily, Lupe Lopez, APRN-CNP, 10 mg at 08/09/24 1037    cholecalciferol (Vitamin D-3) capsule 50,000 Units, 50,000 Units, oral, Once per day on Monday Thursday, Alex Caraballo MD, 50,000 Units at 08/08/24 0830    dextrose 10 % in water (D10W) infusion, 50 mL/hr, intravenous, Continuous, Wendy Pérez, APRN-CNP, Last Rate: 50 mL/hr at 08/10/24 1446, 50 mL/hr at 08/10/24 1446    dextrose 50 % injection 12.5 g, 12.5 g, intravenous, q15 min PRN, Alex Caraballo MD, 12.5 g at 08/02/24 1605    dextrose 50 % injection 25 g, 25 g, intravenous, q15 min PRN, Alex Caraballo MD, 25 g at 07/31/24 1730    diphenhydrAMINE (BENADryl) capsule 25 mg, 25 mg, oral, q6h PRN, Alex Caraballo MD, 25 mg at 07/24/24 1525    epoetin tyson (Epogen,Procrit) injection 10,000 Units, 10,000 Units, subcutaneous, Once per day on Monday Wednesday Friday, Alex Caraballo MD, 10,000 Units at 08/09/24 1042    folic acid (Folvite) tablet 1 mg, 1 mg, oral, Daily, Alex Caraballo MD, 1 mg at 08/10/24 0900    glucagon (Glucagen) injection 1 mg, 1 mg, intramuscular, q15 min PRN, Alex Caraballo MD    glucagon (Glucagen) injection 1 mg, 1 mg, intramuscular, q15 min PRN, Alex Caraballo MD    heparin 25,000 Units in dextrose 5% 250 mL (100 Units/mL)  infusion (premix), 0-4,500 Units/hr, intravenous, Continuous, Alex Caraballo MD, Last Rate: 15 mL/hr at 08/10/24 1005, 1,500 Units/hr at 08/10/24 1005    HYDROmorphone (Dilaudid) injection 0.1 mg, 0.1 mg, intravenous, q3h PRN, Alex Caraballo MD, 0.1 mg at 08/08/24 1130    [Held by provider] insulin glargine (Lantus) injection 8 Units, 8 Units, subcutaneous, Nightly, Alex Caraballo MD, 8 Units at 08/09/24 2203    [Held by provider] insulin lispro (HumaLOG) injection 0-15 Units, 0-15 Units, subcutaneous, TID, Alex Caraballo MD, 9 Units at 08/10/24 1136    insulin regular (HumuLIN, NovoLIN) bolus from bag 7 Units, 0.1 Units/kg, intravenous, PRN, OSBALDO Sheikh    insulin regular 100 unit/100 mL (1 unit/mL) in 0.9 % NaCl infusion, 0.5 Units/hr, intravenous, Continuous, OSBALDO Sheikh, Last Rate: 0.5 mL/hr at 08/10/24 1451, 0.5 Units/hr at 08/10/24 1451    levothyroxine (Synthroid, Levoxyl) tablet 150 mcg, 150 mcg, oral, Daily before breakfast, Alex Caraballo MD, 150 mcg at 08/10/24 0602    melatonin tablet 6 mg, 6 mg, oral, Nightly, Alex Caraballo MD, 6 mg at 08/09/24 2101    midodrine (Proamatine) tablet 10 mg, 10 mg, oral, BID, OSBALDO Potter, 10 mg at 08/10/24 1409    midodrine (Proamatine) tablet 15 mg, 15 mg, oral, Nightly, OSBALDO Potter, 15 mg at 08/09/24 2102    nystatin (Mycostatin) 100,000 unit/gram powder 1 Application, 1 Application, Topical, BID, Alex Caraballo MD, 1 Application at 08/10/24 0902    ondansetron (Zofran) injection 4 mg, 4 mg, intravenous, q6h PRN, Gerardo Foreman MD, 4 mg at 08/07/24 0838    oxyCODONE (Roxicodone) immediate release tablet 5 mg, 5 mg, oral, q4h PRN, Alex Caraballo MD, 5 mg at 08/09/24 1347    pantoprazole (ProtoNix) EC tablet 40 mg, 40 mg, oral, BID AC, Delvin Gomez, APRN-CNP, 40 mg at 08/10/24 0602    [START ON 9/6/2024] pantoprazole (ProtoNix) EC tablet 40 mg, 40 mg, oral, Daily before breakfast, Delvin Gomez,  APRN-CNP    polyethylene glycol (Glycolax, Miralax) packet 17 g, 17 g, oral, BID, Delvin Gomez, APRN-CNP, 17 g at 08/09/24 2101    sennosides-docusate sodium (Aracelis-Colace) 8.6-50 mg per tablet 1 tablet, 1 tablet, oral, BID, Lupe Lopez, AMARILYS-CNP, 1 tablet at 08/09/24 2101    thiamine (Vitamin B-1) tablet 200 mg, 200 mg, oral, Daily, Alex Caraballo MD, 200 mg at 08/10/24 0859    vancomycin (Vancocin) pharmacy to dose - pharmacy monitoring, , miscellaneous, Daily PRN, Alex Caraballo MD    vancomycin 5 mg/mL + heparin 2500 units/mL in NS lock, 2 mL, intra-catheter, q48h, OSBALDO Sheikh, 2 mL at 08/09/24 1616    warfarin (Coumadin) tablet 2.5 mg, 2.5 mg, oral, Daily, OSBALDO Sheikh    Objective     Physical Exam  Heart S1 S2 RRR, Lungs CTA, + edema      Vital signs in last 24 hours:  Temp:  [36.1 °C (97 °F)-36.6 °C (97.9 °F)] 36.3 °C (97.3 °F)  Heart Rate:  [85-94] 85  Resp:  [12-17] 13  BP: (82-92)/(43-53) 87/53       Intake/Output last 3 shifts:  I/O last 3 completed shifts:  In: 1032 (14.6 mL/kg) [P.O.:60; I.V.:822 (11.6 mL/kg); IV Piggyback:150]  Out: 0 (0 mL/kg)   Dosing Weight: 70.9 kg     Labs:  Results from last 7 days   Lab Units 08/10/24  0314   WBC AUTO x10*3/uL 9.1   RBC AUTO x10*6/uL 2.39*   HEMOGLOBIN g/dL 7.2*   HEMATOCRIT % 22.3*     Results from last 7 days   Lab Units 08/10/24  0857 08/10/24  0314   SODIUM mmol/L 132* 134*   POTASSIUM mmol/L 4.2 4.4   CHLORIDE mmol/L 94* 94*   CO2 mmol/L 18* 15*   BUN mg/dL 18 17   CREATININE mg/dL 3.09* 2.87*   CALCIUM mg/dL 8.9 8.8   PHOSPHORUS mg/dL 4.4 4.2   MAGNESIUM mg/dL  --  1.96   BILIRUBIN TOTAL mg/dL 1.0  --    ALT U/L 8*  --    AST U/L 8*  --        Assessment/Plan     Principal Problem:    Septic shock (Multi)  Active Problems:    Acute infective endocarditis (HHS-HCC)    Tricuspid valve vegetation (HHS-HCC)    Staphylococcal arthritis of left wrist (Multi)    Infection of left wrist (Multi)    ESRD patient with  multiple medical problems as above. Has been tolerating HD. Electrolytes and fluid status acceptable, will plan next HD 8/12.      Karishma Mcmanus MD  8/10/2024  3:33 PM

## 2024-08-10 NOTE — SIGNIFICANT EVENT
08/10/24 1207   Onset Documentation   Rapid Response Initiated By Radar auto page   Location/Room [facility]  ([facility] 0630)   Pager Time 1206   Arrival Time 1207   Event End Time 1212   Level II Called No   Primary Reason for Call Radar auto page     Rapid Response Note    Radar auto-page received for a radar score of 6 with the following vital signs:  36.3, 87, 12, 87/53, 90%.  Vital signs were confirmed and reviewed with primary RN.  SpO2 rechecked for 94%, otherwise he is at his baseline.  There are no indications for interventions by Rapid Response at this time.  RN to contact Rapid Response with any future concerns or signs of clinical decompensation.

## 2024-08-10 NOTE — PROGRESS NOTES
08/10/24.  10:14 AM    Vascular Medicine    We are following Mr. 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 He is a medically very complex patient who also has hx of PAD with UE/LE amputation, diabetes.    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  Of note, he had prior upper GI bleed on Eliquis in 6.2024 and has chronic anemia.    I came to see him today on the [facility] floor. Sleepy but interacted with me.  No bleeding reported.    Patient Active Problem List   Diagnosis    ESRD (end stage renal disease) on dialysis (Multi)    Hypertension    Itch    Pain    Trigger middle finger of left hand    Type 1 diabetes mellitus (Multi)    Vitamin D deficiency    PVD (peripheral vascular disease) (CMS-HCC)    DVT prophylaxis    Overweight with body mass index (BMI) 25.0-29.9    Hypothyroidism    HLD (hyperlipidemia)    GERD (gastroesophageal reflux disease)    Folic acid deficiency    Elevated alkaline phosphatase level    Cellulitis and abscess of left leg    Septic shock (Multi)    SIRS (systemic inflammatory response syndrome) (Multi)    Pneumonia of both lungs due to infectious organism, unspecified part of lung    PATRICIA (acute kidney injury) (CMS-HCC)    Acute infective endocarditis ([facility])    Tricuspid valve vegetation ([facility])    Staphylococcal arthritis of left wrist (Multi)    Infection of left wrist (Multi)     Current Facility-Administered Medications   Medication Dose Route Frequency Provider Last Rate Last Admin    acetaminophen (Tylenol) tablet 650 mg  650 mg oral q4h PRN Alex Caraballo MD   650 mg at 08/02/24 2059    atorvastatin (Lipitor) tablet 40 mg  40 mg oral Nightly Alex Caraballo MD   40 mg at 08/09/24 2101    B complex-vitamin C-folic acid (Nephrocaps) capsule 1 capsule  1 capsule oral Daily Alex Caraballo MD   1 capsule at 08/10/24 0858    bisacodyl  (Dulcolax) suppository 10 mg  10 mg rectal Daily Lupe Lopez, APRN-CNP   10 mg at 08/09/24 1037    cholecalciferol (Vitamin D-3) capsule 50,000 Units  50,000 Units oral Once per day on Monday Thursday Alex Caraballo MD   50,000 Units at 08/08/24 0830    dextrose 50 % injection 12.5 g  12.5 g intravenous q15 min PRN Alex Caraballo MD   12.5 g at 08/02/24 1605    dextrose 50 % injection 25 g  25 g intravenous q15 min PRN Alex Caraballo MD   25 g at 07/31/24 1730    diphenhydrAMINE (BENADryl) capsule 25 mg  25 mg oral q6h PRN Alex Caraballo MD   25 mg at 07/24/24 1525    epoetin tyson (Epogen,Procrit) injection 10,000 Units  10,000 Units subcutaneous Once per day on Monday Wednesday Friday Alex Caraballo MD   10,000 Units at 08/09/24 1042    folic acid (Folvite) tablet 1 mg  1 mg oral Daily Alex Caraballo MD   1 mg at 08/10/24 0900    glucagon (Glucagen) injection 1 mg  1 mg intramuscular q15 min PRN Alex Caraballo MD        glucagon (Glucagen) injection 1 mg  1 mg intramuscular q15 min PRN Alex Caraballo MD        heparin 25,000 Units in dextrose 5% 250 mL (100 Units/mL) infusion (premix)  0-4,500 Units/hr intravenous Continuous Alex Caraballo MD 15 mL/hr at 08/10/24 1005 1,500 Units/hr at 08/10/24 1005    HYDROmorphone (Dilaudid) injection 0.1 mg  0.1 mg intravenous q3h PRN Alex Caraballo MD   0.1 mg at 08/08/24 1130    insulin glargine (Lantus) injection 8 Units  8 Units subcutaneous Nightly Alex Caraballo MD   8 Units at 08/09/24 2203    insulin lispro (HumaLOG) injection 0-15 Units  0-15 Units subcutaneous TID Alex Caraballo MD        levothyroxine (Synthroid, Levoxyl) tablet 150 mcg  150 mcg oral Daily before breakfast Alex Caraballo MD   150 mcg at 08/10/24 0602    melatonin tablet 6 mg  6 mg oral Nightly Alex Caraballo MD   6 mg at 08/09/24 2101    midodrine (Proamatine) tablet 10 mg  10 mg oral BID AMARILYS Potter-CNP   10 mg at 08/10/24 0859    midodrine (Proamatine) tablet 15 mg  15 mg oral Nightly Delvin DAWKINS  Patricia, APRN-CNP   15 mg at 08/09/24 2102    nystatin (Mycostatin) 100,000 unit/gram powder 1 Application  1 Application Topical BID Alex Caraballo MD   1 Application at 08/10/24 0902    ondansetron (Zofran) injection 4 mg  4 mg intravenous q6h PRN Gerardo Foreman MD   4 mg at 08/07/24 0838    oxyCODONE (Roxicodone) immediate release tablet 5 mg  5 mg oral q4h PRN Alex Caraballo MD   5 mg at 08/09/24 1347    pantoprazole (ProtoNix) EC tablet 40 mg  40 mg oral BID AC OSBALDO Potter   40 mg at 08/10/24 0602    [START ON 9/6/2024] pantoprazole (ProtoNix) EC tablet 40 mg  40 mg oral Daily before breakfast OSBALDO Potter        polyethylene glycol (Glycolax, Miralax) packet 17 g  17 g oral BID OSBALDO Potter   17 g at 08/09/24 2101    sennosides-docusate sodium (Aracelis-Colace) 8.6-50 mg per tablet 1 tablet  1 tablet oral BID OSBALDO Kiser   1 tablet at 08/09/24 2101    thiamine (Vitamin B-1) tablet 200 mg  200 mg oral Daily Alex Caraballo MD   200 mg at 08/10/24 0859    vancomycin (Vancocin) pharmacy to dose - pharmacy monitoring   miscellaneous Daily PRN Alex Caraballo MD        vancomycin 5 mg/mL + heparin 2500 units/mL in NS lock  2 mL intra-catheter q48h OSBALDO Sheikh   2 mL at 08/09/24 1616    warfarin (Coumadin) tablet 5 mg  5 mg oral Daily OSBALDO Sheikh   5 mg at 08/09/24 1735     On examination:  He is lying supine in bed in no distress  Left neck has IV line in place  Prior amputation noted  Right leg (left leg AKA) with 2+ edema    Labs reviewed:  No HgB yet today  Yesterday HgB 7.5 -- 7.2  PLT 91-103K, uptrending  INR today 1.7  Heparin assay 0.4       Date    INR     Warfarin Dose     Comments   8/8      1.3           5mg                 8/9      1.2           5mg  8.10     1.7           2.5 mg -- drop dose     Recommendations:  Continue anticoagulation for 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  He is therapeutic on IV heparin.  Had jump in INR with 2 doses of 5 g warfarin. Would drop coumadin dosing to 2.5 mg/day starting today and continue the bridging process. Needs at least 5 days of overlap with heparin and INR >= 2 x 2 consecutive days.  PLT uptrending; ongoing anemia of chronic disease but also prior GI Bleed. Please check CBC. Awaiting MRI brain to r/o septic emboli in context of endocarditis which would potentially contraindicate further therapeutic anticoagulation.  Following with you.    Brynn Gregorio MD

## 2024-08-11 ENCOUNTER — APPOINTMENT (OUTPATIENT)
Dept: RADIOLOGY | Facility: HOSPITAL | Age: 39
End: 2024-08-11
Payer: COMMERCIAL

## 2024-08-11 VITALS
BODY MASS INDEX: 23.8 KG/M2 | WEIGHT: 160.72 LBS | HEIGHT: 69 IN | TEMPERATURE: 96.6 F | HEART RATE: 74 BPM | RESPIRATION RATE: 16 BRPM | DIASTOLIC BLOOD PRESSURE: 77 MMHG | OXYGEN SATURATION: 95 % | SYSTOLIC BLOOD PRESSURE: 114 MMHG

## 2024-08-11 LAB
ALBUMIN SERPL BCP-MCNC: 2.2 G/DL (ref 3.4–5)
ALBUMIN SERPL BCP-MCNC: 2.4 G/DL (ref 3.4–5)
ANION GAP SERPL CALC-SCNC: 14 MMOL/L (ref 10–20)
ANION GAP SERPL CALC-SCNC: 15 MMOL/L (ref 10–20)
BUN SERPL-MCNC: 19 MG/DL (ref 6–23)
BUN SERPL-MCNC: 19 MG/DL (ref 6–23)
CALCIUM SERPL-MCNC: 9 MG/DL (ref 8.6–10.6)
CALCIUM SERPL-MCNC: 9.1 MG/DL (ref 8.6–10.6)
CHLORIDE SERPL-SCNC: 94 MMOL/L (ref 98–107)
CHLORIDE SERPL-SCNC: 95 MMOL/L (ref 98–107)
CO2 SERPL-SCNC: 26 MMOL/L (ref 21–32)
CO2 SERPL-SCNC: 27 MMOL/L (ref 21–32)
CREAT SERPL-MCNC: 3.42 MG/DL (ref 0.5–1.3)
CREAT SERPL-MCNC: 3.88 MG/DL (ref 0.5–1.3)
EGFRCR SERPLBLD CKD-EPI 2021: 19 ML/MIN/1.73M*2
EGFRCR SERPLBLD CKD-EPI 2021: 22 ML/MIN/1.73M*2
ERYTHROCYTE [DISTWIDTH] IN BLOOD BY AUTOMATED COUNT: 21.2 % (ref 11.5–14.5)
GLUCOSE BLD MANUAL STRIP-MCNC: 103 MG/DL (ref 74–99)
GLUCOSE BLD MANUAL STRIP-MCNC: 107 MG/DL (ref 74–99)
GLUCOSE BLD MANUAL STRIP-MCNC: 115 MG/DL (ref 74–99)
GLUCOSE BLD MANUAL STRIP-MCNC: 119 MG/DL (ref 74–99)
GLUCOSE BLD MANUAL STRIP-MCNC: 127 MG/DL (ref 74–99)
GLUCOSE BLD MANUAL STRIP-MCNC: 146 MG/DL (ref 74–99)
GLUCOSE BLD MANUAL STRIP-MCNC: 164 MG/DL (ref 74–99)
GLUCOSE BLD MANUAL STRIP-MCNC: 168 MG/DL (ref 74–99)
GLUCOSE BLD MANUAL STRIP-MCNC: 91 MG/DL (ref 74–99)
GLUCOSE SERPL-MCNC: 103 MG/DL (ref 74–99)
GLUCOSE SERPL-MCNC: 80 MG/DL (ref 74–99)
HCT VFR BLD AUTO: 23.1 % (ref 41–52)
HGB BLD-MCNC: 7 G/DL (ref 13.5–17.5)
INR PPP: 2.3 (ref 0.9–1.1)
MAGNESIUM SERPL-MCNC: 1.94 MG/DL (ref 1.6–2.4)
MCH RBC QN AUTO: 30.2 PG (ref 26–34)
MCHC RBC AUTO-ENTMCNC: 30.3 G/DL (ref 32–36)
MCV RBC AUTO: 100 FL (ref 80–100)
NRBC BLD-RTO: 0 /100 WBCS (ref 0–0)
PHOSPHATE SERPL-MCNC: 4.5 MG/DL (ref 2.5–4.9)
PHOSPHATE SERPL-MCNC: 4.7 MG/DL (ref 2.5–4.9)
PLATELET # BLD AUTO: 111 X10*3/UL (ref 150–450)
POTASSIUM SERPL-SCNC: 4 MMOL/L (ref 3.5–5.3)
POTASSIUM SERPL-SCNC: 4 MMOL/L (ref 3.5–5.3)
PROTHROMBIN TIME: 25.6 SECONDS (ref 9.8–12.8)
RBC # BLD AUTO: 2.32 X10*6/UL (ref 4.5–5.9)
SODIUM SERPL-SCNC: 131 MMOL/L (ref 136–145)
SODIUM SERPL-SCNC: 132 MMOL/L (ref 136–145)
UFH PPP CHRO-ACNC: 0.2 IU/ML
UFH PPP CHRO-ACNC: 0.2 IU/ML
UFH PPP CHRO-ACNC: 0.3 IU/ML
UFH PPP CHRO-ACNC: 0.4 IU/ML
WBC # BLD AUTO: 6.2 X10*3/UL (ref 4.4–11.3)

## 2024-08-11 PROCEDURE — 83735 ASSAY OF MAGNESIUM: CPT

## 2024-08-11 PROCEDURE — 84100 ASSAY OF PHOSPHORUS: CPT | Performed by: NURSE PRACTITIONER

## 2024-08-11 PROCEDURE — 36415 COLL VENOUS BLD VENIPUNCTURE: CPT

## 2024-08-11 PROCEDURE — 2500000002 HC RX 250 W HCPCS SELF ADMINISTERED DRUGS (ALT 637 FOR MEDICARE OP, ALT 636 FOR OP/ED): Performed by: NURSE PRACTITIONER

## 2024-08-11 PROCEDURE — 2500000001 HC RX 250 WO HCPCS SELF ADMINISTERED DRUGS (ALT 637 FOR MEDICARE OP)

## 2024-08-11 PROCEDURE — 36415 COLL VENOUS BLD VENIPUNCTURE: CPT | Performed by: NURSE PRACTITIONER

## 2024-08-11 PROCEDURE — 82947 ASSAY GLUCOSE BLOOD QUANT: CPT

## 2024-08-11 PROCEDURE — 85027 COMPLETE CBC AUTOMATED: CPT | Performed by: NURSE PRACTITIONER

## 2024-08-11 PROCEDURE — 70551 MRI BRAIN STEM W/O DYE: CPT

## 2024-08-11 PROCEDURE — 2500000004 HC RX 250 GENERAL PHARMACY W/ HCPCS (ALT 636 FOR OP/ED)

## 2024-08-11 PROCEDURE — 2500000004 HC RX 250 GENERAL PHARMACY W/ HCPCS (ALT 636 FOR OP/ED): Performed by: NURSE PRACTITIONER

## 2024-08-11 PROCEDURE — 99231 SBSQ HOSP IP/OBS SF/LOW 25: CPT | Performed by: INTERNAL MEDICINE

## 2024-08-11 PROCEDURE — 80069 RENAL FUNCTION PANEL: CPT | Performed by: NURSE PRACTITIONER

## 2024-08-11 PROCEDURE — 85610 PROTHROMBIN TIME: CPT | Performed by: NURSE PRACTITIONER

## 2024-08-11 PROCEDURE — 70551 MRI BRAIN STEM W/O DYE: CPT | Performed by: RADIOLOGY

## 2024-08-11 PROCEDURE — 2060000001 HC INTERMEDIATE ICU ROOM DAILY

## 2024-08-11 PROCEDURE — 85520 HEPARIN ASSAY: CPT

## 2024-08-11 RX ORDER — INSULIN GLARGINE 100 [IU]/ML
10 INJECTION, SOLUTION SUBCUTANEOUS NIGHTLY
Status: DISCONTINUED | OUTPATIENT
Start: 2024-08-11 | End: 2024-08-17

## 2024-08-11 RX ORDER — INSULIN LISPRO 100 [IU]/ML
0-15 INJECTION, SOLUTION INTRAVENOUS; SUBCUTANEOUS
Status: DISCONTINUED | OUTPATIENT
Start: 2024-08-11 | End: 2024-08-20

## 2024-08-11 ASSESSMENT — COGNITIVE AND FUNCTIONAL STATUS - GENERAL
PERSONAL GROOMING: TOTAL
TOILETING: TOTAL
DAILY ACTIVITIY SCORE: 6
STANDING UP FROM CHAIR USING ARMS: TOTAL
WALKING IN HOSPITAL ROOM: TOTAL
MOBILITY SCORE: 6
DRESSING REGULAR LOWER BODY CLOTHING: TOTAL
DRESSING REGULAR UPPER BODY CLOTHING: TOTAL
MOVING FROM LYING ON BACK TO SITTING ON SIDE OF FLAT BED WITH BEDRAILS: TOTAL
MOVING TO AND FROM BED TO CHAIR: TOTAL
HELP NEEDED FOR BATHING: TOTAL
CLIMB 3 TO 5 STEPS WITH RAILING: TOTAL
TURNING FROM BACK TO SIDE WHILE IN FLAT BAD: TOTAL
EATING MEALS: TOTAL

## 2024-08-11 ASSESSMENT — PAIN SCALES - GENERAL
PAINLEVEL_OUTOF10: 0 - NO PAIN

## 2024-08-11 ASSESSMENT — PAIN - FUNCTIONAL ASSESSMENT
PAIN_FUNCTIONAL_ASSESSMENT: 0-10
PAIN_FUNCTIONAL_ASSESSMENT: 0-10

## 2024-08-11 ASSESSMENT — PAIN SCALES - WONG BAKER: WONGBAKER_NUMERICALRESPONSE: NO HURT

## 2024-08-11 NOTE — PROGRESS NOTES
08/11/24.  1:05 PM    Vascular Medicine    We are following Mr. 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 He is a medically very complex patient who also has hx of PAD with UE/LE amputation, diabetes. Of note, he had prior upper GI bleed on Eliquis in 6.2024 and has chronic anemia.  We are bridging to warfarin with IV heparin.    He was asleep when I came by to see him. I did not awaken him. Chart reviewed; no overnight events.    Patient Active Problem List   Diagnosis    ESRD (end stage renal disease) on dialysis (Multi)    Hypertension    Itch    Pain    Trigger middle finger of left hand    Type 1 diabetes mellitus (Multi)    Vitamin D deficiency    PVD (peripheral vascular disease) (CMS-[facility])    DVT prophylaxis    Overweight with body mass index (BMI) 25.0-29.9    Hypothyroidism    HLD (hyperlipidemia)    GERD (gastroesophageal reflux disease)    Folic acid deficiency    Elevated alkaline phosphatase level    Cellulitis and abscess of left leg    Septic shock (Multi)    SIRS (systemic inflammatory response syndrome) (Multi)    Pneumonia of both lungs due to infectious organism, unspecified part of lung    PATRICIA (acute kidney injury) (CMS-[facility])    Acute infective endocarditis ([facility])    Tricuspid valve vegetation ([facility])    Staphylococcal arthritis of left wrist (Multi)    Infection of left wrist (Multi)     Current Facility-Administered Medications   Medication Dose Route Frequency Provider Last Rate Last Admin    acetaminophen (Tylenol) tablet 650 mg  650 mg oral q4h PRN Alex Caraballo MD   650 mg at 08/02/24 2059    atorvastatin (Lipitor) tablet 40 mg  40 mg oral Nightly Alex Caraballo MD   40 mg at 08/10/24 2103    B complex-vitamin C-folic acid (Nephrocaps) capsule 1 capsule  1 capsule oral Daily Alex Caraballo MD   1 capsule at 08/11/24 0912    bisacodyl (Dulcolax) suppository 10 mg  10 mg rectal Daily AMARILYS Kiser-CNP   10 mg at 08/09/24 1037    cholecalciferol (Vitamin  D-3) capsule 50,000 Units  50,000 Units oral Once per day on Monday Thursday Alex Caraballo MD   50,000 Units at 08/08/24 0830    dextrose 10 % in water (D10W) infusion  50 mL/hr intravenous Continuous OSBALDO Sheikh 50 mL/hr at 08/11/24 0912 50 mL/hr at 08/11/24 0912    dextrose 50 % injection 12.5 g  12.5 g intravenous q15 min PRN Alex Caraballo MD   12.5 g at 08/02/24 1605    dextrose 50 % injection 25 g  25 g intravenous q15 min PRN Alex Caraballo MD   25 g at 07/31/24 1730    diphenhydrAMINE (BENADryl) capsule 25 mg  25 mg oral q6h PRN Alex Caraballo MD   25 mg at 07/24/24 1525    epoetin tyson (Epogen,Procrit) injection 10,000 Units  10,000 Units subcutaneous Once per day on Monday Wednesday Friday Alex Caraballo MD   10,000 Units at 08/09/24 1042    folic acid (Folvite) tablet 1 mg  1 mg oral Daily Alex Caraballo MD   1 mg at 08/11/24 0912    glucagon (Glucagen) injection 1 mg  1 mg intramuscular q15 min PRN Alex Caraballo MD        glucagon (Glucagen) injection 1 mg  1 mg intramuscular q15 min PRN Alex Caraballo MD        heparin 25,000 Units in dextrose 5% 250 mL (100 Units/mL) infusion (premix)  0-4,500 Units/hr intravenous Continuous Alex Caraballo MD 17 mL/hr at 08/11/24 0903 1,700 Units/hr at 08/11/24 0903    HYDROmorphone (Dilaudid) injection 0.1 mg  0.1 mg intravenous q3h PRN Alex Caraballo MD   0.1 mg at 08/08/24 1130    insulin glargine (Lantus) injection 5 Units  5 Units subcutaneous q24h OSBALDO Smith   5 Units at 08/10/24 2241    [Held by provider] insulin glargine (Lantus) injection 8 Units  8 Units subcutaneous Nightly Alex Caraballo MD   8 Units at 08/09/24 2203    [Held by provider] insulin lispro (HumaLOG) injection 0-15 Units  0-15 Units subcutaneous TID Alex Caraballo MD   9 Units at 08/10/24 1136    insulin regular (HumuLIN, NovoLIN) bolus from bag 7 Units  0.1 Units/kg intravenous PRN Wendy Pérez APRN-CNP        levothyroxine (Synthroid, Levoxyl) tablet 150 mcg  150 mcg  oral Daily before breakfast Alex Caraballo MD   150 mcg at 08/11/24 0624    melatonin tablet 6 mg  6 mg oral Nightly Alex Caraballo MD   6 mg at 08/10/24 2103    midodrine (Proamatine) tablet 10 mg  10 mg oral BID OSBALDO Potter   10 mg at 08/11/24 1103    midodrine (Proamatine) tablet 15 mg  15 mg oral Nightly OSBALDO Potter   15 mg at 08/10/24 2104    nystatin (Mycostatin) 100,000 unit/gram powder 1 Application  1 Application Topical BID Alex Caraballo MD   1 Application at 08/11/24 0912    ondansetron (Zofran) injection 4 mg  4 mg intravenous q6h PRN Gerardo Foreman MD   4 mg at 08/07/24 0838    oxyCODONE (Roxicodone) immediate release tablet 5 mg  5 mg oral q4h PRN Alex Caraballo MD   5 mg at 08/09/24 1347    pantoprazole (ProtoNix) EC tablet 40 mg  40 mg oral BID AC OSBALDO Potter   40 mg at 08/11/24 0625    [START ON 9/6/2024] pantoprazole (ProtoNix) EC tablet 40 mg  40 mg oral Daily before breakfast OSBALDO Potter        polyethylene glycol (Glycolax, Miralax) packet 17 g  17 g oral BID OSBALDO Potter   17 g at 08/09/24 2101    sennosides-docusate sodium (Aracelis-Colace) 8.6-50 mg per tablet 1 tablet  1 tablet oral BID OSBALDO Kiser   1 tablet at 08/09/24 2101    thiamine (Vitamin B-1) tablet 200 mg  200 mg oral Daily Alex Caraballo MD   200 mg at 08/11/24 0912    vancomycin (Vancocin) pharmacy to dose - pharmacy monitoring   miscellaneous Daily PRN Alex Caraballo MD        vancomycin 5 mg/mL + heparin 2500 units/mL in NS lock  2 mL intra-catheter q48h OSBALDO Sheikh   2 mL at 08/09/24 1616    warfarin (Coumadin) tablet 2.5 mg  2.5 mg oral Daily Wendy Pérez, APRN-CNP   2.5 mg at 08/10/24 1704     On examination:  He is lying supine in bed in no distress  Left neck has IV line in place  Prior amputations noted, right arm, left leg    Labs reviewed:  No HgB yet today  HgB 7.5 -- 7.2 --- 7.0 today  PLT 91-103K --- 111K   uptrending  INR today 1.7 --> 2.3  Heparin assay 0.2. subtherapeutic       Date    INR     Warfarin Dose     Comments   8/8      1.3           5mg                 8/9      1.2           5mg  8.10     1.7           2.5 mg   8/11     2.3           2.5 mg    Recommendations:  Continue anticoagulation for recurrent and extensive dialysis line related VTE/chronic central venous occlusive disease and prior IVC PTA.  Repeat heparin assay pending; goal -.3-0.7.  Would dose warfarin 2.5 mg x1 again tonight and check INR tomorrow. Needs to have at least another 24 hours of overlap of IV Heparin and warfarin.      Awaiting MRI brain to r/o septic emboli in context of endocarditis which would potentially contraindicate further therapeutic anticoagulation.  Following with you.    Brynn Gregorio MD

## 2024-08-11 NOTE — CARE PLAN
The clinical goals for the shift include pt will have controlled blood glucose.    Problem: Skin  Goal: Decreased wound size/increased tissue granulation at next dressing change  Outcome: Progressing  Flowsheets (Taken 8/11/2024 1422)  Decreased wound size/increased tissue granulation at next dressing change:   Promote sleep for wound healing   Protective dressings over bony prominences  Goal: Participates in plan/prevention/treatment measures  Outcome: Progressing  Flowsheets (Taken 8/11/2024 1422)  Participates in plan/prevention/treatment measures:   Discuss with provider PT/OT consult   Elevate heels  Goal: Prevent/manage excess moisture  Outcome: Progressing  Flowsheets (Taken 8/11/2024 1422)  Prevent/manage excess moisture: Cleanse incontinence/protect with barrier cream  Goal: Prevent/minimize sheer/friction injuries  Outcome: Progressing  Flowsheets (Taken 8/11/2024 1422)  Prevent/minimize sheer/friction injuries:   HOB 30 degrees or less   Turn/reposition every 2 hours/use positioning/transfer devices   Complete micro-shifts as needed if patient unable. Adjust patient position to relieve pressure points, not a full turn  Goal: Promote/optimize nutrition  Outcome: Progressing  Flowsheets (Taken 8/11/2024 1422)  Promote/optimize nutrition:   Consume > 50% meals/supplements   Monitor/record intake including meals  Goal: Promote skin healing  Outcome: Progressing  Flowsheets (Taken 8/11/2024 1422)  Promote skin healing:   Assess skin/pad under line(s)/device(s)   Protective dressings over bony prominences

## 2024-08-11 NOTE — PROGRESS NOTES
Medical Intensive Care Stepdown Unit - Daily Progress Note   Subjective    Edwar Hemphill is a 39 y.o. year old male patient admitted on 7/22/2024 with septic shock    Interval History:  Insulin gtt off overnight. Heparin gtt infusing into left EJ PIV. The venous or blue colored hemodialysis line port being accessed for lab draws given Left EJ does not draw back and very poor iv access/areas to draw blood from.  INR therapeutic this am at 2.3 on 2.5 mg warfarin, will continue heparin gtt with goal of 2 consecutive therapeutic INR's on heparin gtt overlap per vascular recs. Down for time of flight MRI today to r/o mycotic aneurysm.     Meds    Scheduled medications  atorvastatin, 40 mg, oral, Nightly  B complex-vitamin C-folic acid, 1 capsule, oral, Daily  bisacodyl, 10 mg, rectal, Daily  cholecalciferol, 50,000 Units, oral, Once per day on Monday Thursday  epoetin tyson or biosimilar, 10,000 Units, subcutaneous, Once per day on Monday Wednesday Friday  folic acid, 1 mg, oral, Daily  insulin glargine, 5 Units, subcutaneous, q24h  [Held by provider] insulin glargine, 8 Units, subcutaneous, Nightly  [Held by provider] insulin lispro, 0-15 Units, subcutaneous, TID  levothyroxine, 150 mcg, oral, Daily before breakfast  melatonin, 6 mg, oral, Nightly  midodrine, 10 mg, oral, BID  midodrine, 15 mg, oral, Nightly  nystatin, 1 Application, Topical, BID  pantoprazole, 40 mg, oral, BID AC  [START ON 9/6/2024] pantoprazole, 40 mg, oral, Daily before breakfast  polyethylene glycol, 17 g, oral, BID  sennosides-docusate sodium, 1 tablet, oral, BID  thiamine, 200 mg, oral, Daily  vancomycin 5 mg/mL + heparin 2500 units/mL in NS lock, 2 mL, intra-catheter, q48h  warfarin, 2.5 mg, oral, Daily      Continuous medications  heparin, 0-4,500 Units/hr, Last Rate: 1,700 Units/hr (08/11/24 0903)      PRN medications  PRN medications: acetaminophen **OR** [DISCONTINUED] acetaminophen **OR** [DISCONTINUED] acetaminophen, dextrose, dextrose,  "diphenhydrAMINE, glucagon, glucagon, HYDROmorphone, insulin regular, ondansetron, oxyCODONE, vancomycin     Objective    Blood pressure 88/61, pulse 81, temperature 36.5 °C (97.7 °F), temperature source Temporal, resp. rate 10, height 1.753 m (5' 9.02\"), weight 72.9 kg (160 lb 11.5 oz), SpO2 95%.     Constitutional: pt in NAD, alert and cooperative  Eyes: PERRL, EOMI, no icterus   ENMT: mucous membranes moist, no apparent injury, no lesions seen  Head/Neck: Neck supple, no apparent injury  Respiratory/Thorax: Lungs CTA bilaterally, non-labored breathing, no cough, on RA  Cardiovascular: Regular, rate and rhythm, no murmurs, normal S1 and S2  Gastrointestinal: Nondistended, soft, non-tender, BS present x 4  Musculoskeletal: RUE amputation (healed), L AKA (healed).  L wrist surgical site, tender to touch, surgical incision, well approximated, no signs of infection, redressed with petroleum gauze and kerlix  Extremities: no edema, contusions or wounds  Neurological: alert and oriented x 3, speech clear, follows commands appropriately, cr. n. II-XII intact, sensation grossly intact, motor 5/5 throughout  Skin: Warm and dry, R fem HD line with CHG dressing;       Intake/Output Summary (Last 24 hours) at 8/11/2024 1353  Last data filed at 8/11/2024 0400  Gross per 24 hour   Intake 75 ml   Output 0 ml   Net 75 ml     Labs:   Results from last 72 hours   Lab Units 08/11/24  0610 08/11/24  0122 08/10/24  2051   SODIUM mmol/L 132* 131* 130*   POTASSIUM mmol/L 4.0 4.0 4.0   CHLORIDE mmol/L 95* 94* 94*   CO2 mmol/L 27 26 25   BUN mg/dL 19 19 19   CREATININE mg/dL 3.88* 3.42* 3.54*   GLUCOSE mg/dL 103* 80 107*   CALCIUM mg/dL 9.0 9.1 9.2   ANION GAP mmol/L 14 15 15   EGFR mL/min/1.73m*2 19* 22* 21*   PHOSPHORUS mg/dL 4.7 4.5 4.7      Results from last 72 hours   Lab Units 08/11/24  0610 08/10/24  0314 08/09/24  1625 08/09/24  0316   WBC AUTO x10*3/uL 6.2 9.1 9.2 9.2   HEMOGLOBIN g/dL 7.0* 7.2* 7.2* 7.5*   HEMATOCRIT % 23.1* " "22.3* 23.8* 24.5*   PLATELETS AUTO x10*3/uL 111* 103* 91* 91*   NEUTROS PCT AUTO %  --   --   --  74.9   LYMPHS PCT AUTO %  --   --   --  13.0   MONOS PCT AUTO %  --   --   --  9.3   EOS PCT AUTO %  --   --   --  0.8        Micro/ID:     Lab Results   Component Value Date    BLOODCULT No growth at 4 days -  FINAL REPORT 07/31/2024    BLOODCULT No growth at 4 days -  FINAL REPORT 07/31/2024       Summary of key imaging results from the last 24 hours  8/7 KUB:  Impression:     1.  No gross bowel distention. Moderate amount of stool within the  colon and correlate with a component of fecal impaction.           Assessment and Plan     Assessment: Edwar Hemphill is a 39 y.o. year old male patient with medical history of ESRD on HD (MWF, right femoral line), HFrEF (EF 35-40% 5/2024), T2DM, PAD s/p L AKA and R above elbow amputation with multiple hospitalizations for L AKA infection and MRSA bacteremia  admitted on 7/22/2024 with septic shock    Restraints: no    Summary for 08/11/24  :  Unable to complete CT angio head to rule out mycotic aneurysm 2/2 access and need for power injection for contrast.  Will obtain MRI, \"time of flight\" imaging today 8/11  [facility] yesterday, started insulin gtt and D10 infusion to tx euvolemic DKA, insulin has since been discontinued overnight, D10 off this am. Will resume subcutaneous insulin  Utilizing HD cath venous (blue) port for lab draws, left EJ for heparin infusion 2/2 poor vascular access .   Will need to call IR tomorrow 8/12 to see if right femoral HD catheter can be exchanged for one with a pigtail or if a tunnelled PICC line could be placed next to it. Current Right common femoral vein tunneled dialysis catheter placed in IR on 5/24/2024.   INR therapeutic at 2.3 today, will continue heparin gtt and discontinue once 2 consecutive therapeutic INR's    Plan:  NEUROLOGY/PSYCH:  Dx: Acute left wrist pain   Management:  Pain reg: tylenol PRN, oxycodone prn, and Dilaudid prn "   Melatonin HS   PT/OT/OOB    CARDIOVASCULAR:  Dx: Infective endocarditis with TV vegetation, Severely attenuated superior vena cava, bilateral brachiocephalic, subclavian and internal jugular veins with the calcifications in the brachiocephalic veins with extensive collaterals in the chest wall likely representing chronic thrombosis.  Septic shock, off pressors since 8/4.  Persistent hypotension (asymptomatic).  Hx of HLD  Management:  Home Meds: Atorvastatin 40 mg daily, Midodrine 10 mg QID  Patient had vegectomy aborted due to to concerns for infected thrombus in the IVC.   Continue Atorvastatin 40 mg daily  Midodrine decreased from 15mg Q8hrs to 10mg BID during day and 15mg HS    PULMONARY:  Dx:No acute issues   Management:  Stable on RA    RENAL/GENITOURINARY:  Dx: Hx ESRD on iHD MWF   Management:  HD planned for Monday 8/12  via R Fem HD line    GASTROENTEROLOGY:  Dx: Hx of GERD, Esophagitis, Recent GIB (6/2024) s/p clipping x2. Constipation, resolving.    Poor PO intake  Management:  Diet: Regular  Bowel Regimen: Dulcolax suppository daily, Aracelis-colace BID, and Miralax BID   GI recommending BID PPI x1 month then switch to daily indefinitely. Will need to follow up with Dr. No Kimball as O/p     ENDOCRINOLOGY:  Dx: DM, Hypothyroid.  Euvolemic DKA  Management:  A1C: 6.8 3(3/2024)  Blood sugars  .  Beta- Hydroxybutyrate 7.03  Lantus 8u HS and SSI TID ==> HELD while on insulin gtt and D10  - Insulin gtt and D10 now off- AG closed, gave  total 5.5 units insulin with infusion- received 9 units yesterday am and 5 units yesterday evening for total 19.5 units insulin yesterday, resumed evening Lantus increased dose from 8 to 10 units and resumed SSI for accu-checks QAC and HS    Continue Levothyroxine 150mg daily. Re-check TSH on 8/12 (ordered)     HEMATOLOGY:  Dx: Anema of chronic disease, Thrombocytopenia, Chronic thrombosis of IVC, SVC, blanca brachiocephalic, Subclavian, internal jugular and chronic changes in  "L CFV & Prox fem veins  required 2 U pRBC on 7/24 and 7/29.   Started on heparin drip 7/31  Management:  Vascular Medicine consulted: Okay to begin bridge to coumadin, still need to r/o Mycotic aneurysm. CT angio head ordered. If Mycotic aneurysms noted will need to have further discussion regarding anticoagulation.  Unable to complete CTA head 2/2 access issues.  Per discussion with Radiology, will obtain MRI \"time of flight\" procotol  Continue Coumadin 2.5 mg (decreased from 5 mg per vasc med) and Heparin drip via EJ PIV  Monitor for bleeding given recent GIB  8/11 INR therapeutic, plan on c/w warfarin 2.5 mg, will discontinue heparin gtt after 2 consecutive therapeutic INR's obtained    MUSCULOSKELETAL/ SKIN:  Dx: L wrist septic arthritis, s/p I&D 7/29 with ortho  Contact Ortho on Monday to assess surgical wound (see Ortho note 7/29)    INFECTIOUS DISEASE:  Dx: MRSA bacteremia, Infective Endocarditis (TV),  L wrist septic arthritis s/p I&D (7/29), septic shock (resolved).  Hx of Acinetobacter  Management:  Tmax: 36.6 C, WBC 9.1  MICRO: 7/22 and 7/25 BC with MRSA, 7/27 BC Staph Haemolyticus; 7/27 synovial fluid from Left wrist MRSA, BC 7/29 and 7/31 Neg, 7/29 Fungal cx (tissue) pending, Bacterial cx neg  Antimicrobials: Continue vancomycin post-HD through end date 9/13   Will instill vanc into HD cath after each HD session  Will trial indefinite suppression with Bactrim 1SS daily after vancomycin course (ID follow up on 9/10)   Follow up with ID, Dr Doroteo Graham 9/10     ICU/SDU Check List                  FEN  Fluids: None  Electrolytes: PRN  Nutrition: Regular  Prophylaxis:  DVT ppx: SCDs  GI ppx: PPI BID  Hardware:  Catheter: None  Drains: None  Lines: Tunneled R Fem HD cath, Left EJ PIV  Social:  Code: Full Code    HPOA: Shanthi Hemphill, Parent, [phone]     Disposition: Continue SDU care for tx of Endocarditis & DVT tx with heparin bridge to coumadin  Discharge Planning: from [facility]    Paula PEREZ " AMARILYS Llanes-CNP   08/11/24 at 1:53 PM     Pt discussed with Dr. Medina, seen and examined. All labs, VS and previous plan of care reviewed.  I spent 45 minutes in the professional and overall care of this patient.      Disclaimer: Documentation completed with the information available at the time of input. The times in the chart may not be reflective of actual patient care times, interventions, or procedures. Documentation occurs after the physical care of the patient.

## 2024-08-11 NOTE — CARE PLAN
The patient's goals for the shift include  Pt will remain safe n free from injury    The clinical goals for the shift include pt will remain HDS throughout shift      Problem: Pain - Adult  Goal: Verbalizes/displays adequate comfort level or baseline comfort level  Outcome: Progressing     Problem: Safety - Adult  Goal: Free from fall injury  Outcome: Progressing     Problem: Chronic Conditions and Co-morbidities  Goal: Patient's chronic conditions and co-morbidity symptoms are monitored and maintained or improved  Outcome: Progressing     Problem: Skin  Goal: Decreased wound size/increased tissue granulation at next dressing change  Outcome: Progressing  Flowsheets (Taken 8/11/2024 0517)  Decreased wound size/increased tissue granulation at next dressing change:   Promote sleep for wound healing   Protective dressings over bony prominences   Utilize specialty bed per algorithm  Goal: Participates in plan/prevention/treatment measures  Outcome: Progressing  Flowsheets (Taken 8/11/2024 0517)  Participates in plan/prevention/treatment measures:   Discuss with provider PT/OT consult   Elevate heels  Goal: Prevent/manage excess moisture  Outcome: Progressing  Flowsheets (Taken 8/11/2024 0517)  Prevent/manage excess moisture:   Cleanse incontinence/protect with barrier cream   Monitor for/manage infection if present   Follow provider orders for dressing changes  Goal: Prevent/minimize sheer/friction injuries  Outcome: Progressing  Flowsheets (Taken 8/11/2024 0517)  Prevent/minimize sheer/friction injuries:   Complete micro-shifts as needed if patient unable. Adjust patient position to relieve pressure points, not a full turn   Use pull sheet   HOB 30 degrees or less   Turn/reposition every 2 hours/use positioning/transfer devices   Utilize specialty bed per algorithm  Goal: Promote/optimize nutrition  Outcome: Progressing  Flowsheets (Taken 8/11/2024 0517)  Promote/optimize nutrition:   Monitor/record intake including  meals   Consume > 50% meals/supplements   Offer water/supplements/favorite foods  Goal: Promote skin healing  Outcome: Progressing  Flowsheets (Taken 8/11/2024 6860)  Promote skin healing:   Assess skin/pad under line(s)/device(s)   Protective dressings over bony prominences   Turn/reposition every 2 hours/use positioning/transfer devices   Ensure correct size (line/device) and apply per  instructions   Rotate device position/do not position patient on device     Problem: Diabetes  Goal: Maintain electrolyte levels within acceptable range throughout shift  Outcome: Progressing  Goal: Maintain glucose levels >70mg/dl to <250mg/dl throughout shift  Outcome: Progressing  Goal: No changes in neurological exam by end of shift  Outcome: Progressing  Goal: Learn about and adhere to nutrition recommendations by end of shift  Outcome: Progressing  Goal: Vital signs within normal range for age by end of shift  Outcome: Progressing  Goal: Increase self care and/or family involovement by end of shift  Outcome: Progressing  Goal: Receive DSME education by end of shift  Outcome: Progressing     Problem: Fall/Injury  Goal: Not fall by end of shift  Outcome: Progressing  Goal: Be free from injury by end of the shift  Outcome: Progressing  Goal: Verbalize understanding of personal risk factors for fall in the hospital  Outcome: Progressing

## 2024-08-12 ENCOUNTER — APPOINTMENT (OUTPATIENT)
Dept: DIALYSIS | Facility: HOSPITAL | Age: 39
End: 2024-08-12
Payer: COMMERCIAL

## 2024-08-12 LAB
ALBUMIN SERPL BCP-MCNC: 2.1 G/DL (ref 3.4–5)
ANION GAP SERPL CALC-SCNC: 16 MMOL/L (ref 10–20)
BUN SERPL-MCNC: 22 MG/DL (ref 6–23)
CALCIUM SERPL-MCNC: 8.7 MG/DL (ref 8.6–10.6)
CHLORIDE SERPL-SCNC: 93 MMOL/L (ref 98–107)
CO2 SERPL-SCNC: 27 MMOL/L (ref 21–32)
CREAT SERPL-MCNC: 4.1 MG/DL (ref 0.5–1.3)
EGFRCR SERPLBLD CKD-EPI 2021: 18 ML/MIN/1.73M*2
ERYTHROCYTE [DISTWIDTH] IN BLOOD BY AUTOMATED COUNT: 20.6 % (ref 11.5–14.5)
FUNGUS SPEC CULT: NORMAL
FUNGUS SPEC CULT: NORMAL
FUNGUS SPEC FUNGUS STN: NORMAL
FUNGUS SPEC FUNGUS STN: NORMAL
GLUCOSE BLD MANUAL STRIP-MCNC: 100 MG/DL (ref 74–99)
GLUCOSE BLD MANUAL STRIP-MCNC: 107 MG/DL (ref 74–99)
GLUCOSE BLD MANUAL STRIP-MCNC: 59 MG/DL (ref 74–99)
GLUCOSE BLD MANUAL STRIP-MCNC: 59 MG/DL (ref 74–99)
GLUCOSE BLD MANUAL STRIP-MCNC: 62 MG/DL (ref 74–99)
GLUCOSE BLD MANUAL STRIP-MCNC: 63 MG/DL (ref 74–99)
GLUCOSE BLD MANUAL STRIP-MCNC: 64 MG/DL (ref 74–99)
GLUCOSE BLD MANUAL STRIP-MCNC: 71 MG/DL (ref 74–99)
GLUCOSE SERPL-MCNC: 103 MG/DL (ref 74–99)
HCT VFR BLD AUTO: 22.7 % (ref 41–52)
HGB BLD-MCNC: 7.1 G/DL (ref 13.5–17.5)
INR PPP: 3 (ref 0.9–1.1)
MAGNESIUM SERPL-MCNC: 1.91 MG/DL (ref 1.6–2.4)
MCH RBC QN AUTO: 30.1 PG (ref 26–34)
MCHC RBC AUTO-ENTMCNC: 31.3 G/DL (ref 32–36)
MCV RBC AUTO: 96 FL (ref 80–100)
NRBC BLD-RTO: 0 /100 WBCS (ref 0–0)
PHOSPHATE SERPL-MCNC: 4.9 MG/DL (ref 2.5–4.9)
PLATELET # BLD AUTO: 97 X10*3/UL (ref 150–450)
POTASSIUM SERPL-SCNC: 3.9 MMOL/L (ref 3.5–5.3)
PROTHROMBIN TIME: 33.9 SECONDS (ref 9.8–12.8)
RBC # BLD AUTO: 2.36 X10*6/UL (ref 4.5–5.9)
SODIUM SERPL-SCNC: 132 MMOL/L (ref 136–145)
T4 FREE SERPL-MCNC: 1.01 NG/DL (ref 0.78–1.48)
TSH SERPL-ACNC: 7.47 MIU/L (ref 0.44–3.98)
UFH PPP CHRO-ACNC: 0.4 IU/ML
VANCOMYCIN SERPL-MCNC: 17.5 UG/ML (ref 5–20)
WBC # BLD AUTO: 7.3 X10*3/UL (ref 4.4–11.3)

## 2024-08-12 PROCEDURE — 2500000004 HC RX 250 GENERAL PHARMACY W/ HCPCS (ALT 636 FOR OP/ED)

## 2024-08-12 PROCEDURE — 99232 SBSQ HOSP IP/OBS MODERATE 35: CPT | Performed by: NURSE PRACTITIONER

## 2024-08-12 PROCEDURE — 84439 ASSAY OF FREE THYROXINE: CPT

## 2024-08-12 PROCEDURE — 2500000004 HC RX 250 GENERAL PHARMACY W/ HCPCS (ALT 636 FOR OP/ED): Mod: JZ | Performed by: NURSE PRACTITIONER

## 2024-08-12 PROCEDURE — 2060000001 HC INTERMEDIATE ICU ROOM DAILY

## 2024-08-12 PROCEDURE — 2500000001 HC RX 250 WO HCPCS SELF ADMINISTERED DRUGS (ALT 637 FOR MEDICARE OP)

## 2024-08-12 PROCEDURE — 84443 ASSAY THYROID STIM HORMONE: CPT

## 2024-08-12 PROCEDURE — 83735 ASSAY OF MAGNESIUM: CPT

## 2024-08-12 PROCEDURE — 80069 RENAL FUNCTION PANEL: CPT | Performed by: NURSE PRACTITIONER

## 2024-08-12 PROCEDURE — 85520 HEPARIN ASSAY: CPT

## 2024-08-12 PROCEDURE — 2500000004 HC RX 250 GENERAL PHARMACY W/ HCPCS (ALT 636 FOR OP/ED): Performed by: NURSE PRACTITIONER

## 2024-08-12 PROCEDURE — 90935 HEMODIALYSIS ONE EVALUATION: CPT | Performed by: INTERNAL MEDICINE

## 2024-08-12 PROCEDURE — 36415 COLL VENOUS BLD VENIPUNCTURE: CPT

## 2024-08-12 PROCEDURE — 85027 COMPLETE CBC AUTOMATED: CPT | Performed by: NURSE PRACTITIONER

## 2024-08-12 PROCEDURE — 2500000005 HC RX 250 GENERAL PHARMACY W/O HCPCS

## 2024-08-12 PROCEDURE — 85610 PROTHROMBIN TIME: CPT | Performed by: NURSE PRACTITIONER

## 2024-08-12 PROCEDURE — 6350000001 HC RX 635 EPOETIN >10,000 UNITS: Mod: JZ

## 2024-08-12 PROCEDURE — 2500000002 HC RX 250 W HCPCS SELF ADMINISTERED DRUGS (ALT 637 FOR MEDICARE OP, ALT 636 FOR OP/ED): Performed by: NURSE PRACTITIONER

## 2024-08-12 PROCEDURE — 80202 ASSAY OF VANCOMYCIN: CPT

## 2024-08-12 PROCEDURE — 2500000005 HC RX 250 GENERAL PHARMACY W/O HCPCS: Performed by: NURSE PRACTITIONER

## 2024-08-12 PROCEDURE — 82947 ASSAY GLUCOSE BLOOD QUANT: CPT

## 2024-08-12 PROCEDURE — 8010000001 HC DIALYSIS - HEMODIALYSIS PER DAY

## 2024-08-12 RX ORDER — VANCOMYCIN HYDROCHLORIDE 1 G/200ML
1000 INJECTION, SOLUTION INTRAVENOUS ONCE
Status: COMPLETED | OUTPATIENT
Start: 2024-08-12 | End: 2024-08-12

## 2024-08-12 RX ORDER — WARFARIN SODIUM 5 MG/1
2.5 TABLET ORAL ONCE
Status: DISCONTINUED | OUTPATIENT
Start: 2024-08-12 | End: 2024-08-13

## 2024-08-12 ASSESSMENT — COGNITIVE AND FUNCTIONAL STATUS - GENERAL
STANDING UP FROM CHAIR USING ARMS: TOTAL
PERSONAL GROOMING: TOTAL
HELP NEEDED FOR BATHING: TOTAL
MOBILITY SCORE: 6
CLIMB 3 TO 5 STEPS WITH RAILING: TOTAL
WALKING IN HOSPITAL ROOM: TOTAL
DRESSING REGULAR UPPER BODY CLOTHING: TOTAL
DAILY ACTIVITIY SCORE: 6
TURNING FROM BACK TO SIDE WHILE IN FLAT BAD: TOTAL
TOILETING: TOTAL
MOVING FROM LYING ON BACK TO SITTING ON SIDE OF FLAT BED WITH BEDRAILS: TOTAL
EATING MEALS: TOTAL
MOVING TO AND FROM BED TO CHAIR: TOTAL
DRESSING REGULAR LOWER BODY CLOTHING: TOTAL

## 2024-08-12 ASSESSMENT — PAIN - FUNCTIONAL ASSESSMENT
PAIN_FUNCTIONAL_ASSESSMENT: 0-10

## 2024-08-12 ASSESSMENT — PAIN SCALES - GENERAL
PAINLEVEL_OUTOF10: 0 - NO PAIN
PAINLEVEL_OUTOF10: 0 - NO PAIN
PAINLEVEL_OUTOF10: 8
PAINLEVEL_OUTOF10: 0 - NO PAIN

## 2024-08-12 ASSESSMENT — PAIN DESCRIPTION - ORIENTATION: ORIENTATION: LEFT

## 2024-08-12 ASSESSMENT — PAIN DESCRIPTION - LOCATION: LOCATION: ARM

## 2024-08-12 NOTE — PROGRESS NOTES
"Medical Intensive Care Stepdown Unit - Daily Progress Note   Subjective    Edwar Hemphill is a 39 y.o. year old male patient admitted on 7/22/2024 with septic shock    Interval History:  No acute events over night.  Continued on Heparin gtt via HD catheter over night.  INR therapeutic at 3.0 this am.     Meds    Scheduled medications  alteplase, 1 mg, intra-catheter, Once  atorvastatin, 40 mg, oral, Nightly  B complex-vitamin C-folic acid, 1 capsule, oral, Daily  bisacodyl, 10 mg, rectal, Daily  cholecalciferol, 50,000 Units, oral, Once per day on Monday Thursday  epoetin tyson or biosimilar, 10,000 Units, subcutaneous, Once per day on Monday Wednesday Friday  folic acid, 1 mg, oral, Daily  insulin glargine, 10 Units, subcutaneous, Nightly  insulin lispro, 0-15 Units, subcutaneous, Before meals & nightly  levothyroxine, 150 mcg, oral, Daily before breakfast  melatonin, 6 mg, oral, Nightly  midodrine, 10 mg, oral, BID  midodrine, 15 mg, oral, Nightly  nystatin, 1 Application, Topical, BID  pantoprazole, 40 mg, oral, BID AC  [START ON 9/6/2024] pantoprazole, 40 mg, oral, Daily before breakfast  polyethylene glycol, 17 g, oral, BID  sennosides-docusate sodium, 1 tablet, oral, BID  thiamine, 200 mg, oral, Daily  vancomycin, 1,000 mg, intravenous, Once  vancomycin 5 mg/mL + heparin 2500 units/mL in NS lock, 2 mL, intra-catheter, q48h  warfarin, 2.5 mg, oral, Daily      Continuous medications       PRN medications  PRN medications: acetaminophen **OR** [DISCONTINUED] acetaminophen **OR** [DISCONTINUED] acetaminophen, alteplase, dextrose, dextrose, diphenhydrAMINE, glucagon, glucagon, HYDROmorphone, ondansetron, oxyCODONE, vancomycin     Objective    Blood pressure 85/53, pulse 80, temperature 35.8 °C (96.4 °F), temperature source Skin, resp. rate 14, height 1.753 m (5' 9.02\"), weight 72.8 kg (160 lb 7.9 oz), SpO2 100%.     Constitutional: pt in NAD, alert and cooperative  Eyes: PERRL, EOMI, no icterus   ENMT: mucous " membranes moist, no apparent injury, no lesions seen  Head/Neck: Neck supple, no apparent injury  Respiratory/Thorax: Lungs CTA bilaterally, non-labored breathing, no cough, on RA  Cardiovascular: Regular, rate and rhythm, no murmurs  Gastrointestinal: Nondistended, soft, non-tender, BS present x 4  Musculoskeletal: RUE amputation (healed), L AKA (healed).  L wrist surgical site, tender to touch, surgical incision, well approximated, no signs of infection, redressed with petroleum gauze and kerlix  Extremities: no edema, contusions or wounds  Neurological: alert and oriented x 3, speech clear, follows commands appropriately, good strength LUE and RLE  Skin: Warm and dry, R fem HD line      Intake/Output Summary (Last 24 hours) at 8/12/2024 1534  Last data filed at 8/12/2024 1300  Gross per 24 hour   Intake 1476.53 ml   Output 69 ml   Net 1407.53 ml     Labs:   Results from last 72 hours   Lab Units 08/12/24  0350 08/11/24  0610 08/11/24  0122   SODIUM mmol/L 132* 132* 131*   POTASSIUM mmol/L 3.9 4.0 4.0   CHLORIDE mmol/L 93* 95* 94*   CO2 mmol/L 27 27 26   BUN mg/dL 22 19 19   CREATININE mg/dL 4.10* 3.88* 3.42*   GLUCOSE mg/dL 103* 103* 80   CALCIUM mg/dL 8.7 9.0 9.1   ANION GAP mmol/L 16 14 15   EGFR mL/min/1.73m*2 18* 19* 22*   PHOSPHORUS mg/dL 4.9 4.7 4.5      Results from last 72 hours   Lab Units 08/12/24  0350 08/11/24  0610 08/10/24  0314   WBC AUTO x10*3/uL 7.3 6.2 9.1   HEMOGLOBIN g/dL 7.1* 7.0* 7.2*   HEMATOCRIT % 22.7* 23.1* 22.3*   PLATELETS AUTO x10*3/uL 97* 111* 103*        Micro/ID:     Lab Results   Component Value Date    BLOODCULT No growth at 4 days -  FINAL REPORT 07/31/2024    BLOODCULT No growth at 4 days -  FINAL REPORT 07/31/2024       Summary of key imaging results from the last 24 hours  8/12 MR brain wo IV contrast  -no MRI evidence of acute infarction on the diffusion weighted images.   There is again evidence of mild-to-moderate brain parenchymal volume loss.   Mild nonspecific white  matter changes are noted within cerebral hemispheres bilaterally. While nonspecific, white matter changes can be seen with small-vessel ischemic change or demyelinating processes among others.     8/7 KUB:  1.  No gross bowel distention. Moderate amount of stool within the  colon and correlate with a component of fecal impaction.    8/2 (TTE) Limited  1. Left ventricular ejection fraction is mildly decreased, calculated by Briseno's biplane at 43%.  2. Right ventricular volume overload.    3. Moderately enlarged right ventricle.    4. There is normal right ventricular global systolic function.    5. A large mass is seen the RA (2.4x2.6cm) seems like attached to a catheder (?) projecting from IVC/SVC (difficult to differentiate). A smilar mass was also noted in the POLI from 7/24/2024.    6. The right atrium is moderately dilated.    7. Moderately elevated right ventricular systolic pressure.    8. RVSP is underestimated in the presence of severe TR and a triangular jet.    9. Severe tricuspid regurgitation visualized.   10. A bubble study using agitated saline was performed. Bubble study is positive. A large PFO (> 20 bubbles) was demonstrated.   11. Compared with study dated 7/24/2024, No significant change.  Assessment and Plan     Assessment: Edwar Hemphill is a 39 y.o. year old male patient with medical history of ESRD on HD (MWF, right femoral line), HFrEF (EF 35-40% 5/2024), T2DM, PAD s/p L AKA and R above elbow amputation with multiple hospitalizations for L AKA infection and MRSA bacteremia  admitted on 7/22/2024 with septic shock    Restraints: no    Summary for 08/12/24  :  S/p MRI, showing no evidence of acute infarction on the diffusion weighted images.   Showing evidence of mild-to-moderate brain parenchymal volume loss and chronic small-vessel ischemic changes  Was previously Utilizing HD cath venous (blue) port for Heparin infusion.  Stopping Heparin gtt today as INR therapeutic x2 (2.3, 3.0 today)    Called IR today to see if right femoral HD catheter can be exchanged for one with a pigtail or if a tunnelled PICC line could be placed next to it. Current Right common femoral vein tunneled dialysis catheter placed in IR on 5/24/2024. They will evaluate      Plan:  NEUROLOGY/PSYCH:  Dx: Acute left wrist pain   Management:  Pain reg: tylenol PRN, oxycodone prn, and Dilaudid prn   Melatonin HS   PT/OT/OOB    CARDIOVASCULAR:  Dx: Infective endocarditis with TV vegetation, Severely attenuated superior vena cava, bilateral brachiocephalic, subclavian and internal jugular veins with the calcifications in the brachiocephalic veins with extensive collaterals in the chest wall likely representing chronic thrombosis.  Septic shock, off pressors since 8/4.  Persistent hypotension (asymptomatic).  Hx of HLD  Management:  Home Meds: Atorvastatin 40 mg daily, Midodrine 10 mg QID  Patient had vegectomy aborted due to to concerns for infected thrombus in the IVC.   Continue Atorvastatin 40 mg daily  Midodrine decreased from 15mg Q8hrs to 10mg BID during day and 15mg HS    PULMONARY:  Dx:No acute issues   Management:  Stable on RA    RENAL/GENITOURINARY:  Dx: Hx ESRD on iHD MWF   Management:  HD started today  8/12  via R Fem HD line, had to stop early d/t line clotting.  Held off on HD while Cathflo instilled.  Will continue if able    GASTROENTEROLOGY:  Dx: Hx of GERD, Esophagitis, Recent GIB (6/2024) s/p clipping x2. Constipation, resolving.    Poor PO intake  Management:  Diet: Regular  Bowel Regimen: Dulcolax suppository daily, Aracelis-colace BID, and Miralax BID   GI recommending BID PPI x1 month then switch to daily indefinitely. Will need to follow up with Dr. No Kimball as O/p     ENDOCRINOLOGY:  Dx: DM, Hypothyroid.  Euvolemic DKA, resolved, now hypoglycemia d/t poor appetite  Management:  A1C: 6.8 3(3/2024)  Blood sugars 60s-100s  Required Insulin gtt, off since 8/11 after AG closed  Will hold off on Glargine 10 U HS  Size Of Lesion: 2mm tonight d/t low sugars  Continue  SSI, accu checks and hypoglycemia protocol   Continue Levothyroxine 150mg daily. TSH 7.48, Free T4 1.01     HEMATOLOGY:  Dx: Anema of chronic disease, Thrombocytopenia, Chronic thrombosis of IVC, SVC, blanca brachiocephalic, Subclavian, internal jugular and chronic changes in L CFV & Prox fem veins  required 2 U pRBC on 7/24 and 7/29.   Started on heparin drip 7/31 and now transitioned to Coumadin after INR therapeutic x2 on 8/11 (2.3) and 8/12 (3.0) .  Heparin gtt stopped 8/12  Management:  Continue Coumadin 2.5 mg for now  Monitor for bleeding given recent GIB  MRI head with no evidence of bleeding  Daily INR monitor    MUSCULOSKELETAL/ SKIN:  Dx: L wrist septic arthritis, s/p I&D 7/29 with ortho  Contact Ortho on Tuesday  to assess surgical wound (see Ortho note 7/29)    INFECTIOUS DISEASE:  Dx: MRSA bacteremia, Infective Endocarditis (TV),  L wrist septic arthritis s/p I&D (7/29), septic shock (resolved).  Hx of Acinetobacter  Management:  Tmax: 36.7 C, WBC 7.3  MICRO: 7/22 and 7/25 BC with MRSA, 7/27 BC Staph Haemolyticus; 7/27 synovial fluid from Left wrist MRSA, BC 7/29 and 7/31 Neg, 7/29 Fungal cx (tissue) pending, Bacterial cx neg  Antimicrobials: Continue vancomycin post-HD through end date 9/13   Will instill vanc into HD cath after each HD session  Will trial indefinite suppression with Bactrim 1SS daily after vancomycin course (ID follow up on 9/10)   Follow up with ID, Dr Doroteo Graham 9/10     ICU/SDU Check List                  FEN  Fluids: None  Electrolytes: PRN  Nutrition: Regular  Prophylaxis:  DVT ppx: SCDs  GI ppx: PPI BID  Hardware:  Catheter: None  Drains: None  Lines: Tunneled R Fem HD cath, Left EJ PIV  Social:  Code: Full Code    HPOA: Shanthi Hemphill, Parent, [phone]     Disposition: Continue SDU care for tx of Endocarditis & DVT tx with heparin bridge to coumadin  Discharge Planning: from [facility]    OSBALDO Corbett   08/12/24 at 3:34 PM      Pt discussed with Dr. Medina, seen and examined. All labs, VS and previous plan of care reviewed.  I spent 45 minutes in the professional and overall care of this patient.      Disclaimer: Documentation completed with the information available at the time of input. The times in the chart may not be reflective of actual patient care times, interventions, or procedures. Documentation occurs after the physical care of the patient.        Morphology Per Location (Optional): brown macule, present since childhood per pt Detail Level: Detailed Morphology Per Location (Optional): brown macule Size Of Lesion: 3x3mm Size Of Lesion: 3mm

## 2024-08-12 NOTE — CARE PLAN
The patient's goals for the shift include      The clinical goals for the shift include Pt to remain HDS and have adequate glucose control      Problem: Pain - Adult  Goal: Verbalizes/displays adequate comfort level or baseline comfort level  Outcome: Progressing     Problem: Safety - Adult  Goal: Free from fall injury  Outcome: Progressing     Problem: Chronic Conditions and Co-morbidities  Goal: Patient's chronic conditions and co-morbidity symptoms are monitored and maintained or improved  Outcome: Progressing     Problem: Skin  Goal: Decreased wound size/increased tissue granulation at next dressing change  Outcome: Progressing  Goal: Participates in plan/prevention/treatment measures  Outcome: Progressing  Goal: Prevent/manage excess moisture  Outcome: Progressing  Goal: Prevent/minimize sheer/friction injuries  Outcome: Progressing  Goal: Promote/optimize nutrition  Outcome: Progressing  Goal: Promote skin healing  Outcome: Progressing     Problem: Diabetes  Goal: Maintain electrolyte levels within acceptable range throughout shift  Outcome: Progressing  Goal: Maintain glucose levels >70mg/dl to <250mg/dl throughout shift  Outcome: Progressing  Goal: No changes in neurological exam by end of shift  Outcome: Progressing  Goal: Learn about and adhere to nutrition recommendations by end of shift  Outcome: Progressing  Goal: Vital signs within normal range for age by end of shift  Outcome: Progressing  Goal: Increase self care and/or family involovement by end of shift  Outcome: Progressing  Goal: Receive DSME education by end of shift  Outcome: Progressing     Problem: Fall/Injury  Goal: Not fall by end of shift  Outcome: Progressing  Goal: Be free from injury by end of the shift  Outcome: Progressing  Goal: Verbalize understanding of personal risk factors for fall in the hospital  Outcome: Progressing

## 2024-08-12 NOTE — CONSULTS
"Nutrition Follow Up Assessment:   Nutrition Assessment    Reason for Assessment: Provider consult order    Patient is a 39 y.o. male presenting with septic shock from L AKA infection, MRSA bacteremia and L wrist infection requiring debridement.  Had been on pressors for prolonged period of time but now off and in step-down unit for care.  Experienced euvolemic DKA over the weekend.    Pt is now on a Regular diet.  Met with patient.  Had just received a shortened HD session d/t clogged filter.  He reports a good PO intake while here.  Asked about taking Ensure or Boost and he feels that he does not need anything like that here.  Denies issues with N/V, no diarrhea or constipation.  Spoke with pt's RN.  He reports that when his family brings in food from home, he does very well with eating but he does not do well with eating from the patient menu.        Anthropometrics:  Height: 175.3 cm (5' 9.02\")   Weight: 72.8 kg (160 lb 7.9 oz)   BMI (Calculated): 23.69  IBW/kg (Dietitian Calculated): 72.7 kg (69.1kg adjusted for amputation)  Percent of IBW: 100 %     Weight History:     8/12/24: 72.8kg  8/7/24: 82.4kg  7/22/24: 65.5kg (admit)    Weight Change %:       Nutrition Focused Physical Exam Findings:    Subcutaneous Fat Loss:   Orbital Fat Pads: Well nourished (slightly bulging fat pads)  Buccal Fat Pads: Well nourished (full, rounded cheeks)  Triceps: Well nourished (ample fat tissue)  Muscle Wasting:  Temporalis: Well nourished (well-defined muscle)  Pectoralis (Clavicular Region): Well nourished (clavicle not visible)  Deltoid/Trapezius: Well nourished (rounded appearance at arm, shoulder, neck)  Quadriceps: Well nourished (well developed, well rounded)  Gastrocnemius: Well nourished (well developed bulbous muscle)  Edema:  Edema Location: genealized edema to LUE  Physical Findings:  Skin: Positive (MASD to coccyx; incision L arm, PI R heel)    Nutrition Significant Labs:  BMP Trend:   Results from last 7 days   Lab " Units 08/12/24  0350 08/11/24  0610 08/11/24  0122 08/10/24  2051   GLUCOSE mg/dL 103* 103* 80 107*   CALCIUM mg/dL 8.7 9.0 9.1 9.2   SODIUM mmol/L 132* 132* 131* 130*   POTASSIUM mmol/L 3.9 4.0 4.0 4.0   CO2 mmol/L 27 27 26 25   CHLORIDE mmol/L 93* 95* 94* 94*   BUN mg/dL 22 19 19 19   CREATININE mg/dL 4.10* 3.88* 3.42* 3.54*    , A1C:  Lab Results   Component Value Date    HGBA1C 6.8 (H) 03/30/2024   , BG POCT trend:   Results from last 7 days   Lab Units 08/12/24  1400 08/12/24  0953 08/12/24  0913 08/12/24  0846 08/12/24  0812   POCT GLUCOSE mg/dL 63* 107* 62* 64* 59*    , Renal Lab Trend:   Results from last 7 days   Lab Units 08/12/24  0350 08/11/24  0610 08/11/24  0122 08/10/24  2051   POTASSIUM mmol/L 3.9 4.0 4.0 4.0   PHOSPHORUS mg/dL 4.9 4.7 4.5 4.7   SODIUM mmol/L 132* 132* 131* 130*   MAGNESIUM mg/dL 1.91 1.94  --   --    EGFR mL/min/1.73m*2 18* 19* 22* 21*   BUN mg/dL 22 19 19 19   CREATININE mg/dL 4.10* 3.88* 3.42* 3.54*    , Vit D:   Lab Results   Component Value Date    VITD25 79 11/11/2022        Nutrition Specific Medications:  Scheduled medications  alteplase, 1 mg, intra-catheter, Once  atorvastatin, 40 mg, oral, Nightly  B complex-vitamin C-folic acid, 1 capsule, oral, Daily  bisacodyl, 10 mg, rectal, Daily  cholecalciferol, 50,000 Units, oral, Once per day on Monday Thursday  epoetin tyson or biosimilar, 10,000 Units, subcutaneous, Once per day on Monday Wednesday Friday  folic acid, 1 mg, oral, Daily  insulin glargine, 10 Units, subcutaneous, Nightly  insulin lispro, 0-15 Units, subcutaneous, Before meals & nightly  levothyroxine, 150 mcg, oral, Daily before breakfast  melatonin, 6 mg, oral, Nightly  midodrine, 10 mg, oral, BID  midodrine, 15 mg, oral, Nightly  nystatin, 1 Application, Topical, BID  pantoprazole, 40 mg, oral, BID AC  [START ON 9/6/2024] pantoprazole, 40 mg, oral, Daily before breakfast  polyethylene glycol, 17 g, oral, BID  sennosides-docusate sodium, 1 tablet, oral,  BID  thiamine, 200 mg, oral, Daily  vancomycin, 1,000 mg, intravenous, Once  vancomycin 5 mg/mL + heparin 2500 units/mL in NS lock, 2 mL, intra-catheter, q48h  warfarin, 2.5 mg, oral, Daily      Continuous medications     PRN medications  PRN medications: acetaminophen **OR** [DISCONTINUED] acetaminophen **OR** [DISCONTINUED] acetaminophen, alteplase, dextrose, dextrose, diphenhydrAMINE, glucagon, glucagon, HYDROmorphone, ondansetron, oxyCODONE, vancomycin     I/O:   Last BM Date: 08/10/24; Stool Appearance: Soft (08/10/24 0421)        Dietary Orders (From admission, onward)       Start     Ordered    07/30/24 0950  Adult diet Regular  Diet effective now        Question:  Diet type  Answer:  Regular    07/30/24 0950                     Estimated Needs:   Total Energy Estimated Needs (kCal):  (7773-8910)  Method for Estimating Needs: MSJ= 1558  Total Protein Estimated Needs (g):  (90+)  Method for Estimating Needs: 1.3 x 69kg            Nutrition Diagnosis      Nutrition Diagnosis  Diagnosis Status (1): New  Nutrition Diagnosis 1: Inadequate oral intake  Related to (1): prolonged hospital course  As Evidenced by (1): inadequate PO intake of hospital food with need for supplements       Nutrition Interventions/Recommendations         Nutrition Prescription:   Continue PO diet as tolerated          Nutrition Interventions:    RDN will order Nepro (425kcals, 19grams) and Ensure Clear (240kcals, 8grams protein each) supplements       Nutrition Education:   Not appropriate       Nutrition Monitoring and Evaluation   Food/Nutrient Related History Monitoring  Monitoring and Evaluation Plan: Energy intake  Criteria: PO diet-- meals, supplements and snacks-- to meet 100% estimated needs       Time Spent/Follow-up Reminder:   Time Spent (min): 60 minutes  Last Date of Nutrition Visit: 08/12/24  Nutrition Follow-Up Needed?: Dietitian to reassess per policy  Follow up Comment: poor PO of hospital food, family bringing food from  home

## 2024-08-12 NOTE — PROCEDURES
I evaluated the patient during dialysis. No complaints.    BP: 85/53  BFR: 400  Anticipated fluid removal: minimal  Vascular access: catheter      Plan: Continue TTS dialysis schedule.  Jeanette

## 2024-08-12 NOTE — PROGRESS NOTES
Vancomycin Dosing by Pharmacy- FOLLOW UP    Edwar Hemphill is a 39 y.o. year old male who Pharmacy has been consulted for vancomycin dosing for other bacteremia . Based on the patient's indication and renal status this patient is being dosed based on a goal pre-HD level of 20-25.     Renal function is currently HD dependent.    Most recent random level: 17.5 mcg/mL    Visit Vitals  BP 85/58 (BP Location: Left arm, Patient Position: Lying)   Pulse 75   Temp 36.2 °C (97.2 °F)   Resp 14        Lab Results   Component Value Date    CREATININE 4.10 (H) 2024    CREATININE 3.88 (H) 2024    CREATININE 3.42 (H) 2024    CREATININE 3.54 (H) 08/10/2024        Patient weight is as follows:   Vitals:    24 0600   Weight: 72.8 kg (160 lb 7.9 oz)       Cultures:  No results found for the encounter in last 14 days.       I/O last 3 completed shifts:  In: 769.7 (10.9 mL/kg) [P.O.:75; I.V.:694.7 (9.8 mL/kg)]  Out: 0 (0 mL/kg)   Dosing Weight: 70.9 kg   I/O during current shift:  No intake/output data recorded.    Temp (24hrs), Av.2 °C (97.2 °F), Min:35.9 °C (96.6 °F), Max:36.7 °C (98.1 °F)      Assessment/Plan    Below goal random/trough level. Will redose with 1000mg once after HD on 24.   The next level will be obtained prior to next HD session (will follow renal plan). May be obtained sooner if clinically indicated.   Will continue to monitor renal function daily while on vancomycin and order serum creatinine at least every 48 hours if not already ordered.  Follow for continued vancomycin needs, clinical response, and signs/symptoms of toxicity.       Giana Fuller, PharmD

## 2024-08-12 NOTE — PROGRESS NOTES
"Edwar Hemphill is a 39 y.o. male on day 21 of admission.   Medical history significant for anemia of chronic disease, ESRD on HD via right femoral line, HFrEF, multiple line-related DVTs (BIJ, BUE, BLE treated with Eliquis prior to recent upper GIB 6/15/24), NICM, paroxsymal a-fib, PFO, PVD (s/p left AKA, right forearm amputation), T2DM, tobacco use.     Admitted to MICU for treatment of septic shock due to finding of infective endocarditis with TV vegetation due to HD line infection (line in right femoral).      Vascular Medicine Service initially consulted 7/23/24 for anticoagulation recommendations given recurrent line-related VTEs. Vascular Medicine Service re-consulted 8/6/24 for home going anticoagulation recommendations.   Patient with continued thrombocytopenia, and HIT has been ruled out.   Continues with Heparin infusion as bridge to Warfarin which was started 8/8/24.      Subjective   Denies CP, SOB or bleeding.      Objective   Continues with Heparin infusion as bridge to Warfarin.     Physical Exam  Resting in bed in NAD  Respirations full and easy; breath sounds CTA; on RA  Normal heart sounds; NSR seen on cardiac monitor; vital signs stable with soft BP   Abdomen soft and nontender to palpation.  Extremities warm to touch; palpable left radial and right DP pulses; edema of RLE from top of foot to above the calf; left hand with Kerlix wrap with evidence of old blood on the dressing; right femoral HD line in place with ports capped; LEJ line in place with Heparin infusion  Patient is drowsy but participates a little in conversation; flat affect; falls asleep quickly when conversation has ended     Last Recorded Vitals  Blood pressure 85/58, pulse 75, temperature 36.2 °C (97.2 °F), resp. rate 14, height 1.753 m (5' 9.02\"), weight 72.8 kg (160 lb 7.9 oz), SpO2 100%.  Intake/Output last 3 Shifts:  I/O last 3 completed shifts:  In: 769.7 (10.9 mL/kg) [P.O.:75; I.V.:694.7 (9.8 mL/kg)]  Out: 0 (0 mL/kg) "   Dosing Weight: 70.9 kg     Relevant Results  Scheduled medications  atorvastatin, 40 mg, oral, Nightly  B complex-vitamin C-folic acid, 1 capsule, oral, Daily  bisacodyl, 10 mg, rectal, Daily  cholecalciferol, 50,000 Units, oral, Once per day on Monday Thursday  epoetin tyson or biosimilar, 10,000 Units, subcutaneous, Once per day on Monday Wednesday Friday  folic acid, 1 mg, oral, Daily  insulin glargine, 10 Units, subcutaneous, Nightly  insulin lispro, 0-15 Units, subcutaneous, Before meals & nightly  levothyroxine, 150 mcg, oral, Daily before breakfast  melatonin, 6 mg, oral, Nightly  midodrine, 10 mg, oral, BID  midodrine, 15 mg, oral, Nightly  nystatin, 1 Application, Topical, BID  pantoprazole, 40 mg, oral, BID AC  [START ON 9/6/2024] pantoprazole, 40 mg, oral, Daily before breakfast  polyethylene glycol, 17 g, oral, BID  sennosides-docusate sodium, 1 tablet, oral, BID  thiamine, 200 mg, oral, Daily  vancomycin, 1,000 mg, intravenous, Once  vancomycin 5 mg/mL + heparin 2500 units/mL in NS lock, 2 mL, intra-catheter, q48h  warfarin, 2.5 mg, oral, Daily      Continuous medications  heparin, 0-4,500 Units/hr, Last Rate: 1,900 Units/hr (08/12/24 0749)      Results from last 7 days   Lab Units 08/12/24  0350 08/11/24  0610 08/10/24  0314   WBC AUTO x10*3/uL 7.3 6.2 9.1   HEMOGLOBIN g/dL 7.1* 7.0* 7.2*   HEMATOCRIT % 22.7* 23.1* 22.3*   PLATELETS AUTO x10*3/uL 97* 111* 103*       Results from last 7 days   Lab Units 08/12/24  0350 08/11/24  0610 08/11/24  0122   SODIUM mmol/L 132* 132* 131*   POTASSIUM mmol/L 3.9 4.0 4.0   CHLORIDE mmol/L 93* 95* 94*   CO2 mmol/L 27 27 26   BUN mg/dL 22 19 19   CREATININE mg/dL 4.10* 3.88* 3.42*   GLUCOSE mg/dL 103* 103* 80   CALCIUM mg/dL 8.7 9.0 9.1       Results from last 7 days   Lab Units 08/12/24  0350 08/11/24  0610 08/10/24  0857   INR  3.0* 2.3* 1.7*       Results from last 7 days   Lab Units 08/12/24  0350 08/11/24  2252 08/11/24  1809   ANTI XA UNFRACTIONATED IU/mL 0.4  0.4 0.2       MR BRAIN WO IV CONTRAST; 8/11/2024 2:55 pm   IMPRESSION:  There is no MRI evidence of acute infarction on the diffusion weighted images.  There is again evidence of mild-to-moderate brain parenchymal volume loss.  Mild nonspecific white matter changes are noted within cerebral hemispheres bilaterally. While nonspecific, white matter changes can be seen with small-vessel ischemic change or demyelinating processes among others.         Assessment/Plan   Principal Problem:    Septic shock (Multi)  Active Problems:    Acute infective endocarditis (HHS-HCC)    Tricuspid valve vegetation (HHS-HCC)    Staphylococcal arthritis of left wrist (Multi)    Infection of left wrist (Multi)    39 year old male with complex medical history, admitted for finding of infective endocarditis due to HD line infection, complicated by septic shock.  Vascular Medicine Service following for anticoagulation recommendations for chronic thrombosis of IVC, SVC, bilateral brachiocephalic, subclavian, internal jugular veins, and chronic changes noted in the left CFV and proximal femoral veins.    Continues with Heparin infusion with therapeutic assay 0.4.    Review of labs shows stable hemoglobin 7.1 grams, continued thrombocytopenia with stable platelets 97K without evidence of bleeding.        Date    INR     Warfarin Dose     Comments   8/8      1.3           5mg                Baseline INR; Start bridge to Warfarin  8/9      1.2           5mg  8/10    1.7           2.5mg  8/11     2.3          2.5mg              First day with therapeutic INR  8/12     3.0          2.5mg              Second day with therapeutic INR; Heparin infusion to stop at 6pm     Recommendations:  ~STOP Heparin infusion at 6pm today; follow nomogram to achieve therapeutic assay 0.3-0.7  ~CONTINUE Warfarin 2.5mg this evening.  ~MONITOR for bleeding  ~Daily labs: CBC (monitor hg and plts), daily INR while hospitalized  ~Will need [facility] monitoring at [facility]  "2 times a week, with\"as needed\" Warfarin dose adjustment to maintain INR goal 2-3. When patient discharged to home from SNF, will need outpatient Coumadin Clinic arranged prior to SNF discharge with initial appointment scheduled for 1-2 days after discharge.      Plan of care discussed with Dr. Nhung Carrillo  Plan of care discussed in person with Ms. Starla ROBLERO from the [facility] SDU Team     OSBALDO Richardson  Vascular Medicine Service   Pager [phone]               "

## 2024-08-13 LAB
ABO GROUP (TYPE) IN BLOOD: NORMAL
ALBUMIN SERPL BCP-MCNC: 2.2 G/DL (ref 3.4–5)
ANION GAP SERPL CALC-SCNC: 12 MMOL/L (ref 10–20)
ANTIBODY SCREEN: NORMAL
BLOOD EXPIRATION DATE: NORMAL
BUN SERPL-MCNC: 13 MG/DL (ref 6–23)
CALCIUM SERPL-MCNC: 8.4 MG/DL (ref 8.6–10.6)
CHLORIDE SERPL-SCNC: 96 MMOL/L (ref 98–107)
CO2 SERPL-SCNC: 28 MMOL/L (ref 21–32)
CREAT SERPL-MCNC: 2.61 MG/DL (ref 0.5–1.3)
DISPENSE STATUS: NORMAL
EGFRCR SERPLBLD CKD-EPI 2021: 31 ML/MIN/1.73M*2
ERYTHROCYTE [DISTWIDTH] IN BLOOD BY AUTOMATED COUNT: 20.1 % (ref 11.5–14.5)
GLUCOSE BLD MANUAL STRIP-MCNC: 122 MG/DL (ref 74–99)
GLUCOSE BLD MANUAL STRIP-MCNC: 123 MG/DL (ref 74–99)
GLUCOSE BLD MANUAL STRIP-MCNC: 164 MG/DL (ref 74–99)
GLUCOSE BLD MANUAL STRIP-MCNC: 87 MG/DL (ref 74–99)
GLUCOSE BLD MANUAL STRIP-MCNC: 98 MG/DL (ref 74–99)
GLUCOSE SERPL-MCNC: 106 MG/DL (ref 74–99)
HCT VFR BLD AUTO: 21.7 % (ref 41–52)
HGB BLD-MCNC: 6.9 G/DL (ref 13.5–17.5)
INR PPP: 2.5 (ref 0.9–1.1)
MAGNESIUM SERPL-MCNC: 1.85 MG/DL (ref 1.6–2.4)
MCH RBC QN AUTO: 30 PG (ref 26–34)
MCHC RBC AUTO-ENTMCNC: 31.8 G/DL (ref 32–36)
MCV RBC AUTO: 94 FL (ref 80–100)
NRBC BLD-RTO: 0 /100 WBCS (ref 0–0)
PHOSPHATE SERPL-MCNC: 3.4 MG/DL (ref 2.5–4.9)
PLATELET # BLD AUTO: 73 X10*3/UL (ref 150–450)
POTASSIUM SERPL-SCNC: 3.6 MMOL/L (ref 3.5–5.3)
PRODUCT BLOOD TYPE: 5100
PRODUCT CODE: NORMAL
PROTHROMBIN TIME: 28.4 SECONDS (ref 9.8–12.8)
RBC # BLD AUTO: 2.3 X10*6/UL (ref 4.5–5.9)
RH FACTOR (ANTIGEN D): NORMAL
SODIUM SERPL-SCNC: 132 MMOL/L (ref 136–145)
UNIT ABO: NORMAL
UNIT NUMBER: NORMAL
UNIT RH: NORMAL
UNIT VOLUME: 350
WBC # BLD AUTO: 7.2 X10*3/UL (ref 4.4–11.3)
XM INTEP: NORMAL

## 2024-08-13 PROCEDURE — 2500000001 HC RX 250 WO HCPCS SELF ADMINISTERED DRUGS (ALT 637 FOR MEDICARE OP)

## 2024-08-13 PROCEDURE — 36415 COLL VENOUS BLD VENIPUNCTURE: CPT | Performed by: NURSE PRACTITIONER

## 2024-08-13 PROCEDURE — 2500000002 HC RX 250 W HCPCS SELF ADMINISTERED DRUGS (ALT 637 FOR MEDICARE OP, ALT 636 FOR OP/ED)

## 2024-08-13 PROCEDURE — 83735 ASSAY OF MAGNESIUM: CPT

## 2024-08-13 PROCEDURE — 86901 BLOOD TYPING SEROLOGIC RH(D): CPT

## 2024-08-13 PROCEDURE — 2500000002 HC RX 250 W HCPCS SELF ADMINISTERED DRUGS (ALT 637 FOR MEDICARE OP, ALT 636 FOR OP/ED): Performed by: NURSE PRACTITIONER

## 2024-08-13 PROCEDURE — 82947 ASSAY GLUCOSE BLOOD QUANT: CPT

## 2024-08-13 PROCEDURE — 85027 COMPLETE CBC AUTOMATED: CPT | Performed by: NURSE PRACTITIONER

## 2024-08-13 PROCEDURE — 80069 RENAL FUNCTION PANEL: CPT | Performed by: NURSE PRACTITIONER

## 2024-08-13 PROCEDURE — P9016 RBC LEUKOCYTES REDUCED: HCPCS

## 2024-08-13 PROCEDURE — 99232 SBSQ HOSP IP/OBS MODERATE 35: CPT | Performed by: NURSE PRACTITIONER

## 2024-08-13 PROCEDURE — 36430 TRANSFUSION BLD/BLD COMPNT: CPT

## 2024-08-13 PROCEDURE — 2060000001 HC INTERMEDIATE ICU ROOM DAILY

## 2024-08-13 PROCEDURE — 85610 PROTHROMBIN TIME: CPT | Performed by: NURSE PRACTITIONER

## 2024-08-13 PROCEDURE — 99232 SBSQ HOSP IP/OBS MODERATE 35: CPT | Performed by: INTERNAL MEDICINE

## 2024-08-13 RX ORDER — POTASSIUM CHLORIDE 750 MG/1
10 TABLET, FILM COATED, EXTENDED RELEASE ORAL ONCE
Status: DISCONTINUED | OUTPATIENT
Start: 2024-08-13 | End: 2024-08-13

## 2024-08-13 RX ORDER — WARFARIN 2.5 MG/1
2.5 TABLET ORAL DAILY
Status: DISCONTINUED | OUTPATIENT
Start: 2024-08-13 | End: 2024-08-20

## 2024-08-13 ASSESSMENT — COGNITIVE AND FUNCTIONAL STATUS - GENERAL
HELP NEEDED FOR BATHING: TOTAL
STANDING UP FROM CHAIR USING ARMS: TOTAL
DAILY ACTIVITIY SCORE: 6
WALKING IN HOSPITAL ROOM: TOTAL
MOBILITY SCORE: 6
EATING MEALS: TOTAL
MOVING FROM LYING ON BACK TO SITTING ON SIDE OF FLAT BED WITH BEDRAILS: TOTAL
TOILETING: TOTAL
CLIMB 3 TO 5 STEPS WITH RAILING: TOTAL
TURNING FROM BACK TO SIDE WHILE IN FLAT BAD: TOTAL
DRESSING REGULAR LOWER BODY CLOTHING: TOTAL
DRESSING REGULAR UPPER BODY CLOTHING: TOTAL
MOVING TO AND FROM BED TO CHAIR: TOTAL
PERSONAL GROOMING: TOTAL

## 2024-08-13 ASSESSMENT — PAIN DESCRIPTION - LOCATION: LOCATION: ARM

## 2024-08-13 ASSESSMENT — PAIN DESCRIPTION - ORIENTATION: ORIENTATION: LEFT

## 2024-08-13 ASSESSMENT — PAIN - FUNCTIONAL ASSESSMENT: PAIN_FUNCTIONAL_ASSESSMENT: 0-10

## 2024-08-13 ASSESSMENT — PAIN SCALES - GENERAL: PAINLEVEL_OUTOF10: 8

## 2024-08-13 NOTE — PROGRESS NOTES
Subjective     Interval History: Edwar Hemphill has no new complaints  Breathing comfortable on room air    Medications    Current Facility-Administered Medications:     acetaminophen (Tylenol) tablet 650 mg, 650 mg, oral, q4h PRN, 650 mg at 08/02/24 2059 **OR** [DISCONTINUED] acetaminophen (Tylenol) oral liquid 650 mg, 650 mg, nasogastric tube, q4h PRN **OR** [DISCONTINUED] acetaminophen (Tylenol) suppository 650 mg, 650 mg, rectal, q4h PRN, Blaine Gonzalez MD    alteplase (Cathflo Activase) injection 1 mg, 1 mg, intra-catheter, PRN, Leilani Bartram, APRN-CNP, 1 mg at 08/12/24 1548    atorvastatin (Lipitor) tablet 40 mg, 40 mg, oral, Nightly, Alex Caraballo MD, 40 mg at 08/12/24 2043    B complex-vitamin C-folic acid (Nephrocaps) capsule 1 capsule, 1 capsule, oral, Daily, Alex Caraballo MD, 1 capsule at 08/13/24 0752    bisacodyl (Dulcolax) suppository 10 mg, 10 mg, rectal, Daily, uLpe Lopez, APRN-CNP, 10 mg at 08/09/24 1037    cholecalciferol (Vitamin D-3) capsule 50,000 Units, 50,000 Units, oral, Once per day on Monday Thursday, Alex Caraballo MD, 50,000 Units at 08/12/24 0822    dextrose 50 % injection 12.5 g, 12.5 g, intravenous, q15 min PRN, Alex Caraballo MD, 12.5 g at 08/12/24 1446    dextrose 50 % injection 25 g, 25 g, intravenous, q15 min PRN, Alex Caraballo MD, 25 g at 07/31/24 1730    diphenhydrAMINE (BENADryl) capsule 25 mg, 25 mg, oral, q6h PRN, Alex Caraballo MD, 25 mg at 07/24/24 1525    epoetin tyson (Epogen,Procrit) injection 10,000 Units, 10,000 Units, subcutaneous, Once per day on Monday Wednesday Friday, Alex Caraballo MD, 10,000 Units at 08/12/24 1001    folic acid (Folvite) tablet 1 mg, 1 mg, oral, Daily, Alex Caraballo MD, 1 mg at 08/13/24 0752    glucagon (Glucagen) injection 1 mg, 1 mg, intramuscular, q15 min PRN, Alex Caraballo MD    glucagon (Glucagen) injection 1 mg, 1 mg, intramuscular, q15 min PRN, Alex Caraballo MD    HYDROmorphone (Dilaudid) injection 0.1 mg, 0.1 mg, intravenous, q3h PRN,  Alex Caraballo MD, 0.1 mg at 08/08/24 1130    [Held by provider] insulin glargine (Lantus) injection 10 Units, 10 Units, subcutaneous, Nightly, OSBALDO Antonio, 10 Units at 08/11/24 2105    insulin lispro (HumaLOG) injection 0-15 Units, 0-15 Units, subcutaneous, Before meals & nightly, OSBALDO Antonio, 3 Units at 08/11/24 1754    levothyroxine (Synthroid, Levoxyl) tablet 150 mcg, 150 mcg, oral, Daily before breakfast, Alex Caraballo MD, 150 mcg at 08/13/24 0650    melatonin tablet 6 mg, 6 mg, oral, Nightly, Alex Carablalo MD, 6 mg at 08/12/24 2043    midodrine (Proamatine) tablet 10 mg, 10 mg, oral, BID, OSBALDO Potter, 10 mg at 08/13/24 0752    midodrine (Proamatine) tablet 15 mg, 15 mg, oral, Nightly, OSBALDO Potter, 15 mg at 08/12/24 2044    nystatin (Mycostatin) 100,000 unit/gram powder 1 Application, 1 Application, Topical, BID, Alex Caraballo MD, 1 Application at 08/13/24 0753    ondansetron (Zofran) injection 4 mg, 4 mg, intravenous, q6h PRN, Gerardo Foreman MD, 4 mg at 08/07/24 0838    oxyCODONE (Roxicodone) immediate release tablet 5 mg, 5 mg, oral, q4h PRN, Alex Caraballo MD, 5 mg at 08/12/24 2143    pantoprazole (ProtoNix) EC tablet 40 mg, 40 mg, oral, BID AC, OSBALDO Potter, 40 mg at 08/13/24 0751    [START ON 9/6/2024] pantoprazole (ProtoNix) EC tablet 40 mg, 40 mg, oral, Daily before breakfast, OSBALDO Potter    polyethylene glycol (Glycolax, Miralax) packet 17 g, 17 g, oral, BID, Delvin Gomez APRN-CNP, 17 g at 08/09/24 2101    sennosides-docusate sodium (Aracelis-Colace) 8.6-50 mg per tablet 1 tablet, 1 tablet, oral, BID, Lupe Lopez, APRN-CNP, 1 tablet at 08/09/24 2101    thiamine (Vitamin B-1) tablet 200 mg, 200 mg, oral, Daily, Alex Caraballo MD, 200 mg at 08/13/24 0752    vancomycin (Vancocin) pharmacy to dose - pharmacy monitoring, , miscellaneous, Daily PRN, Alex Caraballo MD    vancomycin 5 mg/mL + heparin 2500 units/mL in  NS lock, 2 mL, intra-catheter, q48h, Paula Llanes, APRN-CNP, 2 mL at 08/12/24 2144    warfarin (Coumadin) tablet 2.5 mg, 2.5 mg, oral, Once, Leilani Gabriel, APRN-CNP    Objective     Physical Exam  Heart S1 S2 RRR, Lungs CTA, no edema      Vital signs in last 24 hours:  Temp:  [35.7 °C (96.3 °F)-36.6 °C (97.9 °F)] 36.3 °C (97.3 °F)  Heart Rate:  [72-98] 88  Resp:  [16] 16  BP: (74-92)/(53-66) 83/59       Intake/Output last 3 shifts:  I/O last 3 completed shifts:  In: 5516.5 (77.8 mL/kg) [P.O.:440; I.V.:2876.5 (40.6 mL/kg); Other:2000; IV Piggyback:200]  Out: 249 (3.5 mL/kg) [Other:249]  Dosing Weight: 70.9 kg     Labs:  Results from last 7 days   Lab Units 08/13/24  0430   WBC AUTO x10*3/uL 7.2   RBC AUTO x10*6/uL 2.30*   HEMOGLOBIN g/dL 6.9*   HEMATOCRIT % 21.7*     Results from last 7 days   Lab Units 08/13/24  0430 08/10/24  2051 08/10/24  0857   SODIUM mmol/L 132*   < > 132*   POTASSIUM mmol/L 3.6   < > 4.2   CHLORIDE mmol/L 96*   < > 94*   CO2 mmol/L 28   < > 18*   BUN mg/dL 13   < > 18   CREATININE mg/dL 2.61*   < > 3.09*   CALCIUM mg/dL 8.4*   < > 8.9   PHOSPHORUS mg/dL 3.4   < > 4.4   MAGNESIUM mg/dL 1.85   < >  --    BILIRUBIN TOTAL mg/dL  --   --  1.0   ALT U/L  --   --  8*   AST U/L  --   --  8*    < > = values in this interval not displayed.       Assessment/Plan     Principal Problem:    Septic shock (Multi)  Active Problems:    Acute infective endocarditis (HHS-HCC)    Tricuspid valve vegetation (HHS-HCC)    Staphylococcal arthritis of left wrist (Multi)    Infection of left wrist (Multi)    Patient seen and examined. Labs, medications, recent events reviewed. Will follow overall management per primary team and continue regular dialysis while in house.   Plan next HD 8/14    Karishma Mcmanus MD  8/13/2024  10:41 AM

## 2024-08-13 NOTE — PROGRESS NOTES
"Medical Intensive Care - Daily Progress Note   Subjective    Edwar Hemphill is a 39 y.o. year old male patient admitted on 7/22/2024 with septic shock     Interval History:  No acute events overnight.  Morning labs notable for hgb of 6.9, plan to transfuse 1 unit prbc.  No outward s/s of bleeding noted      Meds    Scheduled medications  atorvastatin, 40 mg, oral, Nightly  B complex-vitamin C-folic acid, 1 capsule, oral, Daily  bisacodyl, 10 mg, rectal, Daily  cholecalciferol, 50,000 Units, oral, Once per day on Monday Thursday  epoetin tyson or biosimilar, 10,000 Units, subcutaneous, Once per day on Monday Wednesday Friday  folic acid, 1 mg, oral, Daily  [Held by provider] insulin glargine, 10 Units, subcutaneous, Nightly  insulin lispro, 0-15 Units, subcutaneous, Before meals & nightly  levothyroxine, 150 mcg, oral, Daily before breakfast  melatonin, 6 mg, oral, Nightly  midodrine, 10 mg, oral, BID  midodrine, 15 mg, oral, Nightly  nystatin, 1 Application, Topical, BID  pantoprazole, 40 mg, oral, BID AC  [START ON 9/6/2024] pantoprazole, 40 mg, oral, Daily before breakfast  polyethylene glycol, 17 g, oral, BID  sennosides-docusate sodium, 1 tablet, oral, BID  thiamine, 200 mg, oral, Daily  vancomycin 5 mg/mL + heparin 2500 units/mL in NS lock, 2 mL, intra-catheter, q48h  warfarin, 2.5 mg, oral, Once      Continuous medications     PRN medications  PRN medications: acetaminophen **OR** [DISCONTINUED] acetaminophen **OR** [DISCONTINUED] acetaminophen, alteplase, dextrose, dextrose, diphenhydrAMINE, glucagon, glucagon, HYDROmorphone, ondansetron, oxyCODONE, vancomycin     Objective    Blood pressure 81/57, pulse 98, temperature 36.1 °C (97 °F), temperature source Temporal, resp. rate 16, height 1.753 m (5' 9.02\"), weight 72.6 kg (160 lb 0.9 oz), SpO2 95%.     Physical Exam:  Constitutional: chronically ill appearing pt in NAD, alert and cooperative  Eyes: PERRL, EOMI, no icterus   ENMT: mucous membranes moist, no " apparent injury, no lesions seen  Head/Neck: Neck supple, no apparent injury  Respiratory/Thorax: Lungs CTA bilaterally, non-labored breathing, no cough, on RA  Cardiovascular: Regular, rate and rhythm, no murmurs, normal S1 and S2  Gastrointestinal: Nondistended, soft, non-tender, BS present x 4  Musculoskeletal: RUE amputation, L aka, Left wrist surgical site edematous, tender to touch with 3 sutures in place, covered with xeroform and kerlix  Extremities: +4 edema RLE  Neurological: alert and oriented x 3, speech clear, follows commands appropriately  Skin: Warm and dry/flaky, right fem dialysis line, Pressure injury right heel, pressure injury sacrum,         Intake/Output Summary (Last 24 hours) at 8/13/2024 0654  Last data filed at 8/12/2024 2113  Gross per 24 hour   Intake 5240 ml   Output 249 ml   Net 4991 ml     Labs:   Results from last 72 hours   Lab Units 08/12/24  0350 08/11/24  0610 08/11/24  0122   SODIUM mmol/L 132* 132* 131*   POTASSIUM mmol/L 3.9 4.0 4.0   CHLORIDE mmol/L 93* 95* 94*   CO2 mmol/L 27 27 26   BUN mg/dL 22 19 19   CREATININE mg/dL 4.10* 3.88* 3.42*   GLUCOSE mg/dL 103* 103* 80   CALCIUM mg/dL 8.7 9.0 9.1   ANION GAP mmol/L 16 14 15   EGFR mL/min/1.73m*2 18* 19* 22*   PHOSPHORUS mg/dL 4.9 4.7 4.5      Results from last 72 hours   Lab Units 08/13/24  0430 08/12/24  0350 08/11/24  0610   WBC AUTO x10*3/uL 7.2 7.3 6.2   HEMOGLOBIN g/dL 6.9* 7.1* 7.0*   HEMATOCRIT % 21.7* 22.7* 23.1*   PLATELETS AUTO x10*3/uL 73* 97* 111*        Micro/ID:     Lab Results   Component Value Date    BLOODCULT No growth at 4 days -  FINAL REPORT 07/31/2024    BLOODCULT No growth at 4 days -  FINAL REPORT 07/31/2024       Summary of key imaging results from the last 24 hours  8/11 MR brain wo IV contrast: no MRI evidence of acute infarction on the diffusion weighted images.   There is again evidence of mild-to-moderate brain parenchymal volume loss.   Mild nonspecific white matter changes are noted within  cerebral hemispheres bilaterally. While nonspecific, white matter changes can be seen with small-vessel ischemic change or demyelinating processes among others.         Assessment and Plan     Assessment: Edwar Hemphill is a 39 y.o. year old male patient with hx of ESRD on HD (MWF, right femoral line), HFrEF (EF 35-40% 5/2024), T2DM, PAD s/p L AKA and R above elbow amputation with multiple hospitalizations for L AKA infection and MRSA bacteremia  admitted on 7/22/2024 with septic shock admitted     Restraints: no    Summary for 08/13/24  :  Transfuse 1u PRBC for hgb 6.9.   Ortho Hand contacted to assess right wrist incision site from I/D on 7/29.  Patient to be seen by them today vs tomorrow       Plan:  NEUROLOGY/PSYCH:  Dx: Acute left wrist pain   Management:  Pain reg: tylenol PRN, oxycodone prn, and Dilaudid prn   Melatonin HS   PT/OT/OOB     CARDIOVASCULAR:  Dx: Infective endocarditis with TV vegetation, Severely attenuated superior vena cava, bilateral brachiocephalic, subclavian and internal jugular veins with the calcifications in the brachiocephalic veins with extensive collaterals in the chest wall likely representing chronic thrombosis.  Septic shock, off pressors since 8/4.  Persistent hypotension (asymptomatic).  Hx of HLD  Management:  Home Meds: Atorvastatin 40 mg daily, Midodrine 10 mg QID  Patient had vegectomy aborted due to to concerns for infected thrombus in the IVC.   Continue Atorvastatin 40 mg daily  Midodrine decreased from 15mg Q8hrs to 10mg BID during day and 15mg HS     PULMONARY:  Dx:No acute issues   Management:  Stable on RA     RENAL/GENITOURINARY:  Dx: Hx ESRD on iHD MWF   Management:  Last iHD on 8/12  Plan for next iHD 8/14     GASTROENTEROLOGY:  Dx: Hx of GERD, Esophagitis, Recent GIB (6/2024) s/p clipping x2. Constipation, resolving.    Poor PO intake  Management:  Diet: Regular  Bowel Regimen: Dulcolax suppository daily, Aracelis-colace BID, and Miralax BID   GI recommending BID PPI  x1 month then switch to daily indefinitely. Will need to follow up with Dr. No Kimball as O/p      ENDOCRINOLOGY:  Dx: DM, Hypothyroid.  Euvolemic DKA, resolved, now hypoglycemia d/t poor appetite  Management:  A1C: 6.8 3(3/2024)  Blood sugars 60s-120s  Required Insulin gtt, off since 8/11 after AG closed  Hold Glargine 10 U HS d/t lower sugars 2/2 decreased appetite   Continue  SSI, accu checks and hypoglycemia protocol   Continue Levothyroxine 150mg daily. TSH 7.48, Free T4 1.01      HEMATOLOGY:  Dx: Anema of chronic disease, Thrombocytopenia, Chronic thrombosis of IVC, SVC, blanca brachiocephalic, Subclavian, internal jugular and chronic changes in L CFV & Prox fem veins  Required 2 U pRBC on 7/24 and 7/29.   Hgb 6.9 today, from 7.1. No outward s/s of bleeding noted   Vascular Medicine following, recs appreciated   Started on heparin drip 7/31 and now transitioned to Coumadin after INR therapeutic x2 on 8/11 (2.3) and 8/12 (3.0) .  Heparin gtt stopped 8/12  Management:  Continue Coumadin 2.5 mg   Monitor for bleeding given recent GIB  MRI head with no evidence of bleeding  Daily INR monitor     MUSCULOSKELETAL/ SKIN:  Dx: L wrist septic arthritis, s/p I&D 7/29 with ortho  Ortho hand contacted today to assess incision.  They will see today vs tomorrow     INFECTIOUS DISEASE:  Dx: MRSA bacteremia, Infective Endocarditis (TV),  L wrist septic arthritis s/p I&D (7/29), septic shock (resolved).  Hx of Acinetobacter  Management:  Tmax: 36.6 C, WBC 7.2  MICRO: 7/22 and 7/25 BC with MRSA, 7/27 BC Staph Haemolyticus; 7/27 synovial fluid from Left wrist MRSA, BC 7/29 and 7/31 Neg, 7/29 Fungal cx (tissue) pending, Bacterial cx neg  Antimicrobials: Continue vancomycin post-HD through end date 9/13   Will instill vanc into HD cath after each HD session  Will trial indefinite suppression with Bactrim 1SS daily after vancomycin course (ID follow up on 9/10)   Follow up with ID, Dr Doroteo Graham 9/10     ICU Check List        FEN  Fluids: PRN  Electrolytes: PRN  Nutrition: Regular Diet  Prophylaxis:  DVT ppx: Coumadin  GI ppx: PPI BID  Bowel care: Dulcolax suppository daily, Miralax BID, and Aracelis-Colace BID  Hardware:  Catheter: none  Access: Right femoral tunneled HD line  Lines: Left EJ   Social:  Code: Full Code    HPOA: Shanthi Hemphill, Parent, [phone]   Disposition: Continue in SDU care for tx. Likely transfer to [facility] later today vs tomorrow pending establishment of plan for dialysis line exchange with IR  Discharge: Plan to return to [facility]    AMARILYS Potter-CNP   08/13/24 at 6:54 AM     Pt discussed with Dr. Medina, seen and examined. All labs, VS and previous plan of care reviewed.    Disclaimer: Documentation completed with the information available at the time of input. The times in the chart may not be reflective of actual patient care times, interventions, or procedures. Documentation occurs after the physical care of the patient.

## 2024-08-13 NOTE — CARE PLAN
The patient's goals for the shift include      The clinical goals for the shift include Pt will remain HDS during the shift      Problem: Pain - Adult  Goal: Verbalizes/displays adequate comfort level or baseline comfort level  Outcome: Progressing     Problem: Safety - Adult  Goal: Free from fall injury  Outcome: Progressing     Problem: Chronic Conditions and Co-morbidities  Goal: Patient's chronic conditions and co-morbidity symptoms are monitored and maintained or improved  Outcome: Progressing     Problem: Skin  Goal: Decreased wound size/increased tissue granulation at next dressing change  Outcome: Progressing  Goal: Participates in plan/prevention/treatment measures  Outcome: Progressing  Goal: Prevent/manage excess moisture  Outcome: Progressing  Goal: Prevent/minimize sheer/friction injuries  Outcome: Progressing  Goal: Promote/optimize nutrition  Outcome: Progressing  Goal: Promote skin healing  Outcome: Progressing     Problem: Diabetes  Goal: Maintain electrolyte levels within acceptable range throughout shift  Outcome: Progressing  Goal: Maintain glucose levels >70mg/dl to <250mg/dl throughout shift  Outcome: Progressing  Goal: No changes in neurological exam by end of shift  Outcome: Progressing  Goal: Learn about and adhere to nutrition recommendations by end of shift  Outcome: Progressing  Goal: Vital signs within normal range for age by end of shift  Outcome: Progressing  Goal: Increase self care and/or family involovement by end of shift  Outcome: Progressing  Goal: Receive DSME education by end of shift  Outcome: Progressing     Problem: Fall/Injury  Goal: Not fall by end of shift  Outcome: Progressing  Goal: Be free from injury by end of the shift  Outcome: Progressing  Goal: Verbalize understanding of personal risk factors for fall in the hospital  Outcome: Progressing

## 2024-08-13 NOTE — PROGRESS NOTES
"Edwar Hemphill is a 39 y.o. male on day 22 of admission.   Medical history significant for anemia of chronic disease, ESRD on HD via right femoral line, HFrEF, multiple line-related DVTs (BIJ, BUE, BLE treated with Eliquis prior to recent upper GIB 6/15/24), NICM, paroxsymal a-fib, PFO, PVD (s/p left AKA, right forearm amputation), T2DM, tobacco use.      Admitted to MICU for treatment of septic shock due to finding of infective endocarditis with TV vegetation due to HD line infection (line in right femoral).      Vascular Medicine Service initially consulted 7/23/24 for anticoagulation recommendations given recurrent line-related VTEs. Vascular Medicine Service re-consulted 8/6/24 for home going anticoagulation recommendations.   Patient with continued thrombocytopenia, and HIT has been ruled out.   Continues with Heparin infusion as bridge to Warfarin which was started 8/8/24, and completed 8/13/24.       Subjective   Patient reports feeling \"OK\" today.  Denies CP, SOB or bleeding.      Objective   Has completed bridge to Warfarin therapy.    Physical Exam  Resting in bed in NAD; RN at bedside as patient has just started blood transfusion  Respirations full and easy; breath sounds CTA; on RA  Normal heart sounds; NSR seen on cardiac monitor; vital signs stable   Abdomen soft and nontender to palpation.  Extremities warm to touch; palpable left radial and right DP pulses; edema of RLE from top of foot to above the calf; left hand with Kerlix wrap with evidence of small amount of pink drainage on the dressing; right femoral HD line in place with ports capped; LEJ line in place with blood transfusion.   Patient is awake and participates in conversation; appropriate affect today.     Last Recorded Vitals  Blood pressure 99/66, pulse 94, temperature 36.4 °C (97.5 °F), temperature source Temporal, resp. rate 14, height 1.753 m (5' 9.02\"), weight 72.6 kg (160 lb 0.9 oz), SpO2 98%.  Intake/Output last 3 Shifts:  I/O last 3 " completed shifts:  In: 5516.5 (77.8 mL/kg) [P.O.:440; I.V.:2876.5 (40.6 mL/kg); Other:2000; IV Piggyback:200]  Out: 249 (3.5 mL/kg) [Other:249]  Dosing Weight: 70.9 kg     Relevant Results  Scheduled medications  atorvastatin, 40 mg, oral, Nightly  B complex-vitamin C-folic acid, 1 capsule, oral, Daily  bisacodyl, 10 mg, rectal, Daily  cholecalciferol, 50,000 Units, oral, Once per day on Monday Thursday  epoetin tyson or biosimilar, 10,000 Units, subcutaneous, Once per day on Monday Wednesday Friday  folic acid, 1 mg, oral, Daily  [Held by provider] insulin glargine, 10 Units, subcutaneous, Nightly  insulin lispro, 0-15 Units, subcutaneous, Before meals & nightly  levothyroxine, 150 mcg, oral, Daily before breakfast  melatonin, 6 mg, oral, Nightly  midodrine, 10 mg, oral, BID  midodrine, 15 mg, oral, Nightly  nystatin, 1 Application, Topical, BID  pantoprazole, 40 mg, oral, BID AC  [START ON 9/6/2024] pantoprazole, 40 mg, oral, Daily before breakfast  polyethylene glycol, 17 g, oral, BID  sennosides-docusate sodium, 1 tablet, oral, BID  thiamine, 200 mg, oral, Daily  vancomycin 5 mg/mL + heparin 2500 units/mL in NS lock, 2 mL, intra-catheter, q48h  warfarin, 2.5 mg, oral, Once      Results from last 7 days   Lab Units 08/13/24  0430 08/12/24  0350 08/11/24  0610   WBC AUTO x10*3/uL 7.2 7.3 6.2   HEMOGLOBIN g/dL 6.9* 7.1* 7.0*   HEMATOCRIT % 21.7* 22.7* 23.1*   PLATELETS AUTO x10*3/uL 73* 97* 111*       Results from last 7 days   Lab Units 08/13/24  0430 08/12/24  0350 08/11/24  0610   SODIUM mmol/L 132* 132* 132*   POTASSIUM mmol/L 3.6 3.9 4.0   CHLORIDE mmol/L 96* 93* 95*   CO2 mmol/L 28 27 27   BUN mg/dL 13 22 19   CREATININE mg/dL 2.61* 4.10* 3.88*   GLUCOSE mg/dL 106* 103* 103*   CALCIUM mg/dL 8.4* 8.7 9.0       Results from last 7 days   Lab Units 08/13/24  0430 08/12/24  0350 08/11/24  0610   INR  2.5* 3.0* 2.3*       Assessment/Plan   Principal Problem:    Septic shock (Multi)  Active Problems:    Acute  "infective endocarditis (HHS-HCC)    Tricuspid valve vegetation (HHS-HCC)    Staphylococcal arthritis of left wrist (Multi)    Infection of left wrist (Multi)    39 year old male with complex medical history, admitted for finding of infective endocarditis due to HD line infection, complicated by septic shock.  Vascular Medicine Service following for anticoagulation recommendations for chronic thrombosis of IVC, SVC, bilateral brachiocephalic, subclavian, internal jugular veins, and chronic changes noted in the left CFV and proximal femoral veins.    Has completed bridge to Warfarin therapy.     Review of labs shows stable hemoglobin 6.9 grams, continued thrombocytopenia with stable platelets 73K without evidence of bleeding.  Receiving blood transfusion today.      Date    INR     Warfarin Dose     Comments   8/8      1.3           5mg                Baseline INR; Start bridge to Warfarin  8/9      1.2           5mg  8/10    1.7           2.5mg  8/11     2.3          2.5mg              First day with therapeutic INR  8/12     3.0          2.5mg              Second day with therapeutic INR; Heparin infusion to stop at 6pm  8/13     2.5          2.5mg              Bridge to Warfarin has been completed.      Recommendations:  ~CONTINUE Warfarin 2.5mg   ~MONITOR for bleeding  ~Daily labs: CBC (monitor hg and plts), daily INR while hospitalized  ~Will need INR monitoring at [facility] 2 times a week, with\"as needed\" Warfarin dose adjustment to maintain INR goal 2-3. When patient discharged to home from SNF, will need outpatient Coumadin Clinic arranged prior to SNF discharge with initial appointment scheduled for 1-2 days after discharge.   ~Outpatient follow up with Dr. Nhung Carrillo from the Vascular Medicine Service has been scheduled as a virtual visit on 10/2/24 at 2:30 pm.        Plan of care discussed with Dr. Brynn Gregorio  Plan of care discussed in person with Mr. Delvin PANDA-MARGUERITE and Dr. Castillo " [place] from [facility]     AMARILYS Richardson-CNP  Vascular Medicine Service   Pager [phone]

## 2024-08-13 NOTE — CARE PLAN
The patient's goals for the shift include      The clinical goals for the shift include Pt will remain HDS during the shift    Problem: Skin  Goal: Decreased wound size/increased tissue granulation at next dressing change  Outcome: Progressing  Flowsheets (Taken 8/13/2024 0241)  Decreased wound size/increased tissue granulation at next dressing change:   Promote sleep for wound healing   Protective dressings over bony prominences  Goal: Participates in plan/prevention/treatment measures  Flowsheets (Taken 8/13/2024 0241)  Participates in plan/prevention/treatment measures: Elevate heels  Goal: Prevent/manage excess moisture  Flowsheets (Taken 8/13/2024 0241)  Prevent/manage excess moisture: Follow provider orders for dressing changes  Goal: Promote/optimize nutrition  Outcome: Progressing  Flowsheets (Taken 8/13/2024 0241)  Promote/optimize nutrition: Offer water/supplements/favorite foods     Problem: Diabetes  Goal: Maintain glucose levels >70mg/dl to <250mg/dl throughout shift  Flowsheets (Taken 8/13/2024 0241)  Maintain glucose levels >70mg/dl to <250mg/dl throughout shift: Med administration/monitoring of effect

## 2024-08-14 ENCOUNTER — APPOINTMENT (OUTPATIENT)
Dept: CARDIOLOGY | Facility: HOSPITAL | Age: 39
End: 2024-08-14
Payer: COMMERCIAL

## 2024-08-14 ENCOUNTER — APPOINTMENT (OUTPATIENT)
Dept: DIALYSIS | Facility: HOSPITAL | Age: 39
End: 2024-08-14
Payer: COMMERCIAL

## 2024-08-14 LAB
ALBUMIN SERPL BCP-MCNC: 2.3 G/DL (ref 3.4–5)
ANION GAP SERPL CALC-SCNC: 13 MMOL/L (ref 10–20)
BASE EXCESS BLDV CALC-SCNC: 2.2 MMOL/L (ref -2–3)
BODY TEMPERATURE: 37 DEGREES CELSIUS
BUN SERPL-MCNC: 16 MG/DL (ref 6–23)
CALCIUM SERPL-MCNC: 8.9 MG/DL (ref 8.6–10.6)
CHLORIDE SERPL-SCNC: 95 MMOL/L (ref 98–107)
CO2 SERPL-SCNC: 28 MMOL/L (ref 21–32)
CREAT SERPL-MCNC: 3.49 MG/DL (ref 0.5–1.3)
EGFRCR SERPLBLD CKD-EPI 2021: 22 ML/MIN/1.73M*2
ERYTHROCYTE [DISTWIDTH] IN BLOOD BY AUTOMATED COUNT: 19.3 % (ref 11.5–14.5)
GLUCOSE BLD MANUAL STRIP-MCNC: 119 MG/DL (ref 74–99)
GLUCOSE BLD MANUAL STRIP-MCNC: 134 MG/DL (ref 74–99)
GLUCOSE BLD MANUAL STRIP-MCNC: 164 MG/DL (ref 74–99)
GLUCOSE BLD MANUAL STRIP-MCNC: 167 MG/DL (ref 74–99)
GLUCOSE SERPL-MCNC: 168 MG/DL (ref 74–99)
HCO3 BLDV-SCNC: 27.3 MMOL/L (ref 22–26)
HCT VFR BLD AUTO: 27.2 % (ref 41–52)
HGB BLD-MCNC: 8.6 G/DL (ref 13.5–17.5)
INHALED O2 CONCENTRATION: 21 %
INR PPP: 2.3 (ref 0.9–1.1)
LACTATE SERPL-SCNC: 1.3 MMOL/L (ref 0.4–2)
MAGNESIUM SERPL-MCNC: 1.95 MG/DL (ref 1.6–2.4)
MCH RBC QN AUTO: 28.9 PG (ref 26–34)
MCHC RBC AUTO-ENTMCNC: 31.6 G/DL (ref 32–36)
MCV RBC AUTO: 91 FL (ref 80–100)
NRBC BLD-RTO: 0 /100 WBCS (ref 0–0)
OXYHGB MFR BLDV: 52.9 % (ref 45–75)
PCO2 BLDV: 43 MM HG (ref 41–51)
PH BLDV: 7.41 PH (ref 7.33–7.43)
PHOSPHATE SERPL-MCNC: 4.2 MG/DL (ref 2.5–4.9)
PLATELET # BLD AUTO: 78 X10*3/UL (ref 150–450)
PO2 BLDV: 33 MM HG (ref 35–45)
POTASSIUM SERPL-SCNC: 3.9 MMOL/L (ref 3.5–5.3)
PROTHROMBIN TIME: 26.7 SECONDS (ref 9.8–12.8)
RBC # BLD AUTO: 2.98 X10*6/UL (ref 4.5–5.9)
SAO2 % BLDV: 54 % (ref 45–75)
SODIUM SERPL-SCNC: 132 MMOL/L (ref 136–145)
VANCOMYCIN SERPL-MCNC: 17.8 UG/ML (ref 5–20)
WBC # BLD AUTO: 6.1 X10*3/UL (ref 4.4–11.3)

## 2024-08-14 PROCEDURE — 99232 SBSQ HOSP IP/OBS MODERATE 35: CPT | Performed by: NURSE PRACTITIONER

## 2024-08-14 PROCEDURE — P9047 ALBUMIN (HUMAN), 25%, 50ML: HCPCS | Mod: JZ

## 2024-08-14 PROCEDURE — 85610 PROTHROMBIN TIME: CPT

## 2024-08-14 PROCEDURE — 93005 ELECTROCARDIOGRAM TRACING: CPT

## 2024-08-14 PROCEDURE — 82947 ASSAY GLUCOSE BLOOD QUANT: CPT

## 2024-08-14 PROCEDURE — 2500000001 HC RX 250 WO HCPCS SELF ADMINISTERED DRUGS (ALT 637 FOR MEDICARE OP): Performed by: NURSE PRACTITIONER

## 2024-08-14 PROCEDURE — 2060000001 HC INTERMEDIATE ICU ROOM DAILY

## 2024-08-14 PROCEDURE — 2500000004 HC RX 250 GENERAL PHARMACY W/ HCPCS (ALT 636 FOR OP/ED)

## 2024-08-14 PROCEDURE — 2500000001 HC RX 250 WO HCPCS SELF ADMINISTERED DRUGS (ALT 637 FOR MEDICARE OP)

## 2024-08-14 PROCEDURE — 83735 ASSAY OF MAGNESIUM: CPT

## 2024-08-14 PROCEDURE — 85027 COMPLETE CBC AUTOMATED: CPT | Performed by: NURSE PRACTITIONER

## 2024-08-14 PROCEDURE — 83605 ASSAY OF LACTIC ACID: CPT

## 2024-08-14 PROCEDURE — 2500000004 HC RX 250 GENERAL PHARMACY W/ HCPCS (ALT 636 FOR OP/ED): Performed by: NURSE PRACTITIONER

## 2024-08-14 PROCEDURE — 93010 ELECTROCARDIOGRAM REPORT: CPT | Performed by: INTERNAL MEDICINE

## 2024-08-14 PROCEDURE — 8010000001 HC DIALYSIS - HEMODIALYSIS PER DAY

## 2024-08-14 PROCEDURE — 36415 COLL VENOUS BLD VENIPUNCTURE: CPT | Performed by: NURSE PRACTITIONER

## 2024-08-14 PROCEDURE — 2500000004 HC RX 250 GENERAL PHARMACY W/ HCPCS (ALT 636 FOR OP/ED): Mod: JZ

## 2024-08-14 PROCEDURE — 82565 ASSAY OF CREATININE: CPT | Performed by: NURSE PRACTITIONER

## 2024-08-14 PROCEDURE — 2500000002 HC RX 250 W HCPCS SELF ADMINISTERED DRUGS (ALT 637 FOR MEDICARE OP, ALT 636 FOR OP/ED)

## 2024-08-14 PROCEDURE — 6350000001 HC RX 635 EPOETIN >10,000 UNITS: Mod: JZ

## 2024-08-14 PROCEDURE — 99233 SBSQ HOSP IP/OBS HIGH 50: CPT | Performed by: INTERNAL MEDICINE

## 2024-08-14 PROCEDURE — 2500000002 HC RX 250 W HCPCS SELF ADMINISTERED DRUGS (ALT 637 FOR MEDICARE OP, ALT 636 FOR OP/ED): Performed by: NURSE PRACTITIONER

## 2024-08-14 PROCEDURE — 2500000005 HC RX 250 GENERAL PHARMACY W/O HCPCS

## 2024-08-14 PROCEDURE — 82805 BLOOD GASES W/O2 SATURATION: CPT | Performed by: NURSE PRACTITIONER

## 2024-08-14 PROCEDURE — 36415 COLL VENOUS BLD VENIPUNCTURE: CPT

## 2024-08-14 PROCEDURE — 80202 ASSAY OF VANCOMYCIN: CPT

## 2024-08-14 RX ORDER — ALBUMIN HUMAN 250 G/1000ML
25 SOLUTION INTRAVENOUS ONCE
Status: COMPLETED | OUTPATIENT
Start: 2024-08-14 | End: 2024-08-14

## 2024-08-14 RX ORDER — QUETIAPINE FUMARATE 25 MG/1
12.5 TABLET, FILM COATED ORAL NIGHTLY
Status: DISCONTINUED | OUTPATIENT
Start: 2024-08-14 | End: 2024-08-21

## 2024-08-14 ASSESSMENT — PAIN SCALES - GENERAL
PAINLEVEL_OUTOF10: 0 - NO PAIN

## 2024-08-14 ASSESSMENT — PAIN - FUNCTIONAL ASSESSMENT
PAIN_FUNCTIONAL_ASSESSMENT: 0-10

## 2024-08-14 NOTE — SIGNIFICANT EVENT
RAPID RESPONSE RN NOTE - RADAR 6   08/14/24 1050   Onset Documentation   Rapid Response Initiated By Radar auto page  (RADAR 6)   Location/Room [facility]  ([facility] 6070)   Pager Time [phone]   Arrival Time 1050   Event End Time 1105   Level II Called No   Primary Reason for Call Radar auto page  (RADAR 6)     VS: 36.8, 88, 16, 73/50, 88%.  Patient found sitting up in bed, comfortable- denies dizziness and SOB.  Pulse oxygenation rechecked- POX 97%.   Per Allyson THORNTON the SDU team is considering another dose of Midodrine to help with hypotension, patient supposed to be having HD at bedside later today.  Nursing to contact Rapid Response with any further concerns or patient deterioration.

## 2024-08-14 NOTE — PROGRESS NOTES
"Orthopaedic Hand Surgery Progress Note  08/14/24    Subjective:  Patient evaluated in MICU for 2 week postop check. Patient states he continues to have pain in the left wrist though notes gradual improvement. Denies purulent drainage    Objective:  BP 87/56 (BP Location: Left arm, Patient Position: Lying)   Pulse 85   Temp 36.2 °C (97.2 °F) (Temporal)   Resp 16   Ht 1.753 m (5' 9.02\")   Wt 72.1 kg (158 lb 15.2 oz)   SpO2 95%   BMI 23.46 kg/m²     Gen: arousable, NAD, appropriately conversational  Cardiac: RRR to peripheral palpation  Resp: nonlabored on RA  GI: soft, non-distended  MSK:  LUE:  - Significant swelling with large effusion at the left wrist  - Surgical incision with sutures intact, scant bloody ss drainage, no expressible pus or gross purulence  - Appropriately tender at the left wrist, tolerates gentle ROM   - Motor intact in axillary/AIN/PIN/ulnar distributions  - SILT axillary/radial/median/ulnar distributions  - Hand wwp, 2+ radial pulse, cap refill brisk  - Compartments soft and compressible, no pain with passive stretch of digits      Results for orders placed or performed during the hospital encounter of 07/22/24 (from the past 24 hour(s))   POCT GLUCOSE   Result Value Ref Range    POCT Glucose 98 74 - 99 mg/dL   POCT GLUCOSE   Result Value Ref Range    POCT Glucose 87 74 - 99 mg/dL   POCT GLUCOSE   Result Value Ref Range    POCT Glucose 123 (H) 74 - 99 mg/dL   POCT GLUCOSE   Result Value Ref Range    POCT Glucose 164 (H) 74 - 99 mg/dL   Magnesium   Result Value Ref Range    Magnesium 1.95 1.60 - 2.40 mg/dL   Renal function panel   Result Value Ref Range    Glucose 168 (H) 74 - 99 mg/dL    Sodium 132 (L) 136 - 145 mmol/L    Potassium 3.9 3.5 - 5.3 mmol/L    Chloride 95 (L) 98 - 107 mmol/L    Bicarbonate 28 21 - 32 mmol/L    Anion Gap 13 10 - 20 mmol/L    Urea Nitrogen 16 6 - 23 mg/dL    Creatinine 3.49 (H) 0.50 - 1.30 mg/dL    eGFR 22 (L) >60 mL/min/1.73m*2    Calcium 8.9 8.6 - 10.6 mg/dL "    Phosphorus 4.2 2.5 - 4.9 mg/dL    Albumin 2.3 (L) 3.4 - 5.0 g/dL   CBC   Result Value Ref Range    WBC 6.1 4.4 - 11.3 x10*3/uL    nRBC 0.0 0.0 - 0.0 /100 WBCs    RBC 2.98 (L) 4.50 - 5.90 x10*6/uL    Hemoglobin 8.6 (L) 13.5 - 17.5 g/dL    Hematocrit 27.2 (L) 41.0 - 52.0 %    MCV 91 80 - 100 fL    MCH 28.9 26.0 - 34.0 pg    MCHC 31.6 (L) 32.0 - 36.0 g/dL    RDW 19.3 (H) 11.5 - 14.5 %    Platelets 78 (L) 150 - 450 x10*3/uL   Protime-INR   Result Value Ref Range    Protime 26.7 (H) 9.8 - 12.8 seconds    INR 2.3 (H) 0.9 - 1.1   Vancomycin   Result Value Ref Range    Vancomycin 17.8 5.0 - 20.0 ug/mL   Blood Gas Venous   Result Value Ref Range    POCT pH, Venous 7.41 7.33 - 7.43 pH    POCT pCO2, Venous 43 41 - 51 mm Hg    POCT pO2, Venous 33 (L) 35 - 45 mm Hg    POCT SO2, Venous 54 45 - 75 %    POCT Oxy Hemoglobin, Venous 52.9 45.0 - 75.0 %    POCT Base Excess, Venous 2.2 -2.0 - 3.0 mmol/L    POCT HCO3 Calculated, Venous 27.3 (H) 22.0 - 26.0 mmol/L    Patient Temperature 37.0 degrees Celsius    FiO2 21 %       MR brain wo IV contrast   Final Result   There is no MRI evidence of acute infarction on the diffusion   weighted images.        There is again evidence of mild-to-moderate brain parenchymal volume   loss.        Mild nonspecific white matter changes are noted within cerebral   hemispheres bilaterally. While nonspecific, white matter changes can   be seen with small-vessel ischemic change or demyelinating processes   among others.        MACRO:   None.        Signed by: Gerardo Calle 8/12/2024 6:45 AM   Dictation workstation:   [id number]      XR abdomen 1 view   Final Result   1.  No gross bowel distention. Moderate amount of stool within the   colon and correlate with a component of fecal impaction.        Signed by: Koffi Stroud 8/7/2024 10:42 AM   Dictation workstation:   [id number]      CT head wo IV contrast   Final Result   Minimal cortical volume loss without hydrocephalus, hemorrhage or   extra-axial  collection.        MACRO:   none        Signed by: Nakul Wang 8/6/2024 4:43 PM   Dictation workstation:   [id number]      Transthoracic Echo (TTE) Limited   Final Result      XR chest 1 view   Final Result   1.  Mildly increased perihilar/interstitial lung markings with hazy   bibasilar airspace opacities. Recommend clinical correlation as   findings could reflect developing interstitial/pulmonary edema.   Underlying infectious process can not definitively be excluded.   2. Persistent small bilateral pleural effusions and unchanged linear   atelectasis of the left lung base.        I personally reviewed the images/study and I agree with the findings   as stated by resident Neftaly Harkins. This study was interpreted   at [place], [place].        MACRO:   None        Signed by: Eugene Roemro 7/31/2024 8:44 AM   Dictation workstation:   [id number]      Cardiac Catheterization Procedure   Final Result      XR wrist left 1-2 views   Final Result   No osseous abnormality seen. Soft tissue edema.             I personally reviewed the images/study and I agree with the findings   as stated by Alin Shearer MD, PGY-2 this study was interpreted at   [place], [place].        MACRO:   None        Signed by: Zach Ordonez 7/29/2024 1:00 PM   Dictation workstation:   [id number]      XR wrist left 1-2 views   Final Result   1. Mild nonspecific wrist soft tissue swelling. No soft tissue gas   2. Mild 1st CMC and triscaphe joint osteoarthrosis.             I personally reviewed the image(s)/study and resident interpretation.   I agree with the findings as stated by resident Luis Eduardo Helms.   Data analyzed and images interpreted at [facility], [place], [place].        MACRO:   None        Signed by: Yeni Mantilla 7/28/2024 1:27 PM   Dictation workstation:   [id number]      MR wrist left w  and wo IV contrast   Final Result   1. No abnormal T1 marrow signal or enhancement to suggest   osteomyelitis.   2. Moderate-sized enhancing radiocarpal and midcarpal joint effusion.   Reactive, inflammatory and septic arthritis is in the differential.   Given clinical history of prior septic arthritis, joint fluid   aspiration may be of value to exclude underlying infection.   3. Nonspecific edema and enhancement of the thenar muscles may   represent infectious myositis in the appropriate clinical setting.                  I personally reviewed the images/study and I agree with the findings   as stated. This study was interpreted at [place], [place].        MACRO:   None        Signed by: Zach Ordonez 7/27/2024 8:05 AM   Dictation workstation:   [id number]      CT chest abdomen pelvis w IV contrast   Final Result   CHEST:   1. Interval development of bilateral pulmonary nodular opacities   several of them showing cavitation. Findings are concerning for   septic embolism/cavitary pneumonia.   2. Bilateral pleural effusions.   3. Right femoral central venous catheter with tip terminating in the   right ventricle. Hypodense thrombus around the catheter in the right   atrium correlating with the vegetation seen on prior echocardiogram.   4. Severely attenuated superior vena cava, bilateral brachiocephalic,   subclavian and internal jugular veins with the calcifications in the   brachiocephalic veins with extensive collaterals in the chest wall   likely representing chronic thrombosis.        ABDOMEN-PELVIS:   1. Severely attenuated inferior vena cava with multiple   calcifications, consistent with chronic IVC thrombosis.   2. Interval development of a T10 vertebral body fracture.   Re-demonstration of severe osseous changes throughout the   thoracolumbar spine. Findings are likely secondary to renal   osteodystrophy.   3. Diffuse thickening of the colon and rectum  with the pericolonic   stranding consistent with the proctocolitis which can be   inflammatory/infectious in etiology. Correlate with clinical symptoms.   4. Diffuse mesenteric haziness and anasarca likely representing fluid   overload.   5. Bilateral atrophied kidneys with hypoenhancement, representative   of medical renal disease.        I personally reviewed the images/study and I agree with the findings   as stated by Benjamin Campbell DO PGY-2. This study was interpreted at   [facility], [place], [place].        MACRO:   None        Signed by: Ivan Alfredo 7/25/2024 9:58 PM   Dictation workstation:   [id number]      Transesophageal Echo (POLI)   Final Result      Vascular US Lower Extremity Venous Duplex Right   Final Result      Transthoracic Echo (TTE) Limited   Final Result      Point of Care Ultrasound   Final Result      XR foot right 3+ views   Final Result   Ulceration in the posterior aspect of the calcaneus without   radiographic findings of osteomyelitis.        Severe demineralization of the bones which limits evaluation for   subtle fractures.        Questionable age-indeterminate 2nd, 3rd, and 4th metatarsal neck   fractures which may be however artifactual. Correlate with history   for trauma and point tenderness.        MACRO:   None.        Signed by: Yeni Mantilla 7/22/2024 2:54 PM   Dictation workstation:   [id number]      XR ankle right 3+ views   Final Result   Ulceration in the posterior aspect of the calcaneus without   radiographic findings of osteomyelitis.        Severe demineralization of the bones which limits evaluation for   subtle fractures.        Questionable age-indeterminate 2nd, 3rd, and 4th metatarsal neck   fractures which may be however artifactual. Correlate with history   for trauma and point tenderness.        MACRO:   None.        Signed by: Yeni Mantilla 7/22/2024 2:54 PM   Dictation workstation:   [id number]      XR chest  1 view   Final Result   Linear atelectasis in the left base. Small hazy opacities in the lung   bases similar compared to prior. Trace pleural effusions.   Signed by Roe Ruiz MD      Procedure aborted - Cath    (Results Pending)   Consult to Interventional Radiology    (Results Pending)       Assessment/Plan: 39 y.o. male s/p L wrist I&D on 7/29/2024 with Dr. Allen. Wound not yet healed in setting of chronic illness and poor healing potential. No gross purulence. Tolerates movement at the wrist, low concern for recurrent left wrist septic arthritis at this time    Plan:  - Re-aspiration attempted x1 unsuccessful, patient refusing further attempts   - Wound reinforced with steri strips  - Recommend aggressive elevation above the level of the heart to minimize swelling  - Will continue to monitor pain/swelling/drainage  - Will reassess for remaining suture removal in 7-10 days   - Weight bearing: coffee cup weightbearing LUE  - DVT ppx: SCDs, chemoppx per primary  - Diet: OK for diet from an Orthopaedic perspective  - Pain: multimodal pain control per primary   - Antibiotics: Vanc per pharmacy   - Drains: none     Dispo: KATINA Yin MD, MD  Orthopedic Surgery PGY-2  [facility]  Available by Epic Chat    While inpatient, this patient will be followed by the Orthopaedic Hand team. Please see contact information below:    Ortho Hand  Norbert Yin MD PGY2  Ankita Dyson MD PGY4    Please page [phone] (ortho on-call) after 6pm and on weekends.

## 2024-08-14 NOTE — CONSULTS
Vancomycin Dosing by Pharmacy- FOLLOW UP    Edwar Hemphill is a 39 y.o. year old male who Pharmacy has been consulted for vancomycin dosing for other bacteremia . Based on the patient's indication and renal status this patient is being dosed based on a goal pre-HD level of 20-25.     Renal function is currently stable. Patient on hemodialysis.     Current vancomycin dose: 1g post-HD    Most recent trough level: 17.8 mcg/mL    Visit Vitals  BP (!) 71/49 Comment: reshma Gomez NP notified   Pulse 88   Temp 36.8 °C (98.2 °F) (Temporal)   Resp 16        Lab Results   Component Value Date    CREATININE 3.49 (H) 2024    CREATININE 2.61 (H) 2024    CREATININE 4.10 (H) 2024    CREATININE 3.88 (H) 2024        Patient weight is as follows:   Vitals:    24 0600   Weight: 72.1 kg (158 lb 15.2 oz)       Cultures:  No results found for the encounter in last 14 days.       I/O last 3 completed shifts:  In: 3634.8 (51.3 mL/kg) [I.V.:1600 (22.6 mL/kg); Blood:434.8; Other:1600]  Out: 230 (3.2 mL/kg) [Urine:50 (0 mL/kg/hr); Other:180]  Dosing Weight: 70.9 kg   I/O during current shift:  No intake/output data recorded.    Temp (24hrs), Av.4 °C (97.6 °F), Min:36 °C (96.8 °F), Max:37.2 °C (99 °F)      Assessment/Plan    Below goal trough level for iHD dosing. Will increase dose to 1.25g post-HD for today.   The next level will be obtained on prior to next diaylsis session. May be obtained sooner if clinically indicated.   Will continue to monitor renal function daily while on vancomycin and order serum creatinine at least every 48 hours if not already ordered.  Follow for continued vancomycin needs, clinical response, and signs/symptoms of toxicity.       April Akins, PharmD

## 2024-08-14 NOTE — SIGNIFICANT EVENT
VASCULAR SURGERY PLAN OF CARE    Vascular surgery team re-engaged for Mr Hemphill, 38yo with septic shock and infective endocarditis with TV vegetation due to HD line infection. Originally consulted for removal of R fem line/other options for vegetations on catheter. Since original consult, patient evaluated by cardiac surgery for possible open tip removal and EVLS service for endovascular vegectomy. EVLS team aborted attempt at removal of vegetation from tip of TDC due to intrahepatic IVC occlusion that is likely infected thrombus. Patient too high risk for tricuspid vegectomy given extensive co-morbidities as well.     Unfortunately this patient has very limited options overall, and no further interventions are indicated from the vascular surgery team for similar reasons as above. Would recommend continuation of medical management as desired by patient and family.     Discussed with Dr. Sofia.    Bridget Rowland MD  PGY-2 Vascular Surgery Resident  [phone]

## 2024-08-14 NOTE — PROGRESS NOTES
"Edwar Hemphill is a 39 y.o. male on day 23 of admission.   Medical history significant for anemia of chronic disease, ESRD on HD via right femoral line, HFrEF, multiple line-related DVTs (BIJ, BUE, BLE treated with Eliquis prior to recent upper GIB 6/15/24), NICM, paroxsymal a-fib, PFO, PVD (s/p left AKA, right forearm amputation), T2DM, tobacco use.      Admitted to MICU for treatment of septic shock due to finding of infective endocarditis with TV vegetation due to HD line infection (line in right femoral).      Vascular Medicine Service initially consulted 7/23/24 for anticoagulation recommendations given recurrent line-related VTEs. Vascular Medicine Service re-consulted 8/6/24 for home going anticoagulation recommendations.   Patient with continued thrombocytopenia, and HIT has been ruled out.   Continues with as needed Warfarin dose adjustment to maintain INR 2-3.      Subjective   Denies CP, SOB or bleeding.    Anticipates need for HD today.     Objective   Has completed bridge to Warfarin therapy, continues with as needed Warfarin dose adjustment.     Physical Exam  Resting in bed in NAD  Respirations full and easy; breath sounds CTA; on RA  Normal heart sounds; NSR seen on cardiac monitor; vital signs stable   Abdomen soft and nontender to palpation.  Extremities warm to touch; palpable left radial and right DP pulses; edema of RLE from top of foot to above the calf; left hand with Kerlix wrap; right femoral HD line in place with ports capped; LEJ line in place and capped.    Patient is awake and participates in conversation; appropriate affect today.     Last Recorded Vitals  Blood pressure (!) 76/48, pulse 90, temperature 36.8 °C (98.2 °F), temperature source Temporal, resp. rate 16, height 1.753 m (5' 9.02\"), weight 72.1 kg (158 lb 15.2 oz), SpO2 95%.  Intake/Output last 3 Shifts:  I/O last 3 completed shifts:  In: 3634.8 (51.3 mL/kg) [I.V.:1600 (22.6 mL/kg); Blood:434.8; Other:1600]  Out: 230 (3.2 mL/kg) " [Urine:50 (0 mL/kg/hr); Other:180]  Dosing Weight: 70.9 kg     Relevant Results  Scheduled medications  atorvastatin, 40 mg, oral, Nightly  B complex-vitamin C-folic acid, 1 capsule, oral, Daily  bisacodyl, 10 mg, rectal, Daily  cholecalciferol, 50,000 Units, oral, Once per day on Monday Thursday  epoetin tyson or biosimilar, 10,000 Units, subcutaneous, Once per day on Monday Wednesday Friday  folic acid, 1 mg, oral, Daily  [Held by provider] insulin glargine, 10 Units, subcutaneous, Nightly  insulin lispro, 0-15 Units, subcutaneous, Before meals & nightly  levothyroxine, 150 mcg, oral, Daily before breakfast  melatonin, 6 mg, oral, Nightly  midodrine, 15 mg, oral, TID  nystatin, 1 Application, Topical, BID  pantoprazole, 40 mg, oral, BID AC  [START ON 9/6/2024] pantoprazole, 40 mg, oral, Daily before breakfast  polyethylene glycol, 17 g, oral, BID  sennosides-docusate sodium, 1 tablet, oral, BID  thiamine, 200 mg, oral, Daily  vancomycin, 1,250 mg, intravenous, Once  vancomycin 5 mg/mL + heparin 2500 units/mL in NS lock, 2 mL, intra-catheter, q48h  warfarin, 2.5 mg, oral, Daily      Results from last 7 days   Lab Units 08/14/24 0448 08/13/24  0430 08/12/24  0350   WBC AUTO x10*3/uL 6.1 7.2 7.3   HEMOGLOBIN g/dL 8.6* 6.9* 7.1*   HEMATOCRIT % 27.2* 21.7* 22.7*   PLATELETS AUTO x10*3/uL 78* 73* 97*       Results from last 7 days   Lab Units 08/14/24 0448 08/13/24  0430 08/12/24  0350   SODIUM mmol/L 132* 132* 132*   POTASSIUM mmol/L 3.9 3.6 3.9   CHLORIDE mmol/L 95* 96* 93*   CO2 mmol/L 28 28 27   BUN mg/dL 16 13 22   CREATININE mg/dL 3.49* 2.61* 4.10*   GLUCOSE mg/dL 168* 106* 103*   CALCIUM mg/dL 8.9 8.4* 8.7       Results from last 7 days   Lab Units 08/14/24  0448 08/13/24  0430 08/12/24  0350   INR  2.3* 2.5* 3.0*           Assessment/Plan   Assessment & Plan  Septic shock (Multi)    Acute infective endocarditis (HHS-HCC)    Tricuspid valve vegetation (HHS-HCC)    Staphylococcal arthritis of left wrist  "(Multi)    Infection of left wrist (Multi)    39 year old male with complex medical history, admitted for finding of infective endocarditis due to HD line infection, complicated by septic shock.  Vascular Medicine Service following for anticoagulation recommendations for chronic thrombosis of IVC, SVC, bilateral brachiocephalic, subclavian, internal jugular veins, and chronic changes noted in the left CFV and proximal femoral veins.    Has completed bridge to Warfarin therapy, and continues with \"as needed\" Warfarin dose adjustment. .     Review of labs shows increased hemoglobin from 6.9 grams to 8.6 grams after blood transfusion yesterday, continued thrombocytopenia with stable platelets 78K without evidence of bleeding.    Discussion with Primary Team: team requested to continue Warfarin 2.5mg dose today.      Date    INR     Warfarin Dose     Comments   8/8      1.3           5mg                Baseline INR; Start bridge to Warfarin  8/9      1.2           5mg  8/10    1.7           2.5mg  8/11     2.3          2.5mg              First day with therapeutic INR  8/12     3.0          2.5mg              Second day with therapeutic INR; Heparin infusion to stop at 6pm  8/13     2.5          2.5mg              Bridge to Warfarin has been completed.   8/13     2.3          2.5mg     Recommendations:  ~CONTINUE Warfarin 2.5mg   ~MONITOR for bleeding  ~Daily labs: CBC (monitor hg and plts), daily INR while hospitalized  ~Will need INR monitoring at [facility] 2 times a week, with\"as needed\" Warfarin dose adjustment to maintain INR goal 2-3. When patient discharged to home from SNF, will need outpatient Coumadin Clinic arranged prior to SNF discharge with initial appointment scheduled for 1-2 days after discharge.   ~Outpatient follow up with Dr. Nhung Carrillo from the Vascular Medicine Service has been scheduled as a virtual visit on 10/2/24 at 2:30 pm.         Plan of care discussed with Dr. Brynn Gregorio  Plan " of care discussed in person with Mr. Delvin ROBLERO and Dr. Jonathan Medina from [place] [facility]     OSBALDO Richardson  Vascular Medicine Service   Pager [phone]

## 2024-08-14 NOTE — PROGRESS NOTES
"Medical Intensive Care - Daily Progress Note   Subjective    Edwar Hemphill is a 39 y.o. year old male patient admitted on 7/22/2024 with septic shock     Interval History:  Patient with hallucinations overnight stating that someone was in his room attempting to steal his belongings.  When asked about this event patient stating \"I know who was in here and I know what they were doing.  Im not talking about anything not medical related with you\"  Denies fever, chills, cp, sob, abdominal pain, and n/v     Meds    Scheduled medications  atorvastatin, 40 mg, oral, Nightly  B complex-vitamin C-folic acid, 1 capsule, oral, Daily  bisacodyl, 10 mg, rectal, Daily  cholecalciferol, 50,000 Units, oral, Once per day on Monday Thursday  epoetin tyson or biosimilar, 10,000 Units, subcutaneous, Once per day on Monday Wednesday Friday  folic acid, 1 mg, oral, Daily  [Held by provider] insulin glargine, 10 Units, subcutaneous, Nightly  insulin lispro, 0-15 Units, subcutaneous, Before meals & nightly  levothyroxine, 150 mcg, oral, Daily before breakfast  melatonin, 6 mg, oral, Nightly  midodrine, 10 mg, oral, BID  midodrine, 15 mg, oral, Nightly  nystatin, 1 Application, Topical, BID  pantoprazole, 40 mg, oral, BID AC  [START ON 9/6/2024] pantoprazole, 40 mg, oral, Daily before breakfast  polyethylene glycol, 17 g, oral, BID  sennosides-docusate sodium, 1 tablet, oral, BID  thiamine, 200 mg, oral, Daily  vancomycin 5 mg/mL + heparin 2500 units/mL in NS lock, 2 mL, intra-catheter, q48h  warfarin, 2.5 mg, oral, Daily      Continuous medications     PRN medications  PRN medications: acetaminophen **OR** [DISCONTINUED] acetaminophen **OR** [DISCONTINUED] acetaminophen, alteplase, dextrose, dextrose, diphenhydrAMINE, glucagon, glucagon, HYDROmorphone, ondansetron, oxyCODONE, vancomycin     Objective    Blood pressure 87/56, pulse 85, temperature 36.2 °C (97.2 °F), temperature source Temporal, resp. rate 16, height 1.753 m (5' 9.02\"), weight " 72.1 kg (158 lb 15.2 oz), SpO2 95%.     Physical Exam:  Constitutional: chronically ill appearing pt in NAD, alert and cooperative  Eyes: PERRL, EOMI, no icterus   ENMT: mucous membranes moist, no apparent injury, no lesions seen  Head/Neck: Neck supple, no apparent injury  Respiratory/Thorax: Lungs diminished bilaterally, non-labored breathing, no cough, on RA  Cardiovascular: Regular, rate and rhythm, no murmurs, normal S1 and S2  Gastrointestinal: Nondistended, soft, non-tender, BS hypoactive x 4  Musculoskeletal: RUE amputation, L aka, Left wrist surgical site edematous with mild dehiscence noted, tender to touch with 2 sutures and steristrips in place, covered with xeroform and kerlix  Extremities: +4 edema RLE  Neurological: alert and oriented x 3, forgetful as to situation, speech clear, follows commands appropriately  Skin: Warm and dry/flaky, right fem dialysis line, Pressure injury right heel, pressure injury sacrum,         Intake/Output Summary (Last 24 hours) at 8/14/2024 0704  Last data filed at 8/13/2024 1424  Gross per 24 hour   Intake 434.75 ml   Output 50 ml   Net 384.75 ml     Labs:   Results from last 72 hours   Lab Units 08/14/24 0448 08/13/24  0430 08/12/24  0350   SODIUM mmol/L 132* 132* 132*   POTASSIUM mmol/L 3.9 3.6 3.9   CHLORIDE mmol/L 95* 96* 93*   CO2 mmol/L 28 28 27   BUN mg/dL 16 13 22   CREATININE mg/dL 3.49* 2.61* 4.10*   GLUCOSE mg/dL 168* 106* 103*   CALCIUM mg/dL 8.9 8.4* 8.7   ANION GAP mmol/L 13 12 16   EGFR mL/min/1.73m*2 22* 31* 18*   PHOSPHORUS mg/dL 4.2 3.4 4.9      Results from last 72 hours   Lab Units 08/14/24 0448 08/13/24  0430 08/12/24  0350   WBC AUTO x10*3/uL 6.1 7.2 7.3   HEMOGLOBIN g/dL 8.6* 6.9* 7.1*   HEMATOCRIT % 27.2* 21.7* 22.7*   PLATELETS AUTO x10*3/uL 78* 73* 97*        Micro/ID:     Lab Results   Component Value Date    BLOODCULT No growth at 4 days -  FINAL REPORT 07/31/2024    BLOODCULT No growth at 4 days -  FINAL REPORT 07/31/2024       Summary of  key imaging results from the last 24 hours  8/11 MR brain wo IV contrast: no MRI evidence of acute infarction on the diffusion weighted images.   There is again evidence of mild-to-moderate brain parenchymal volume loss.   Mild nonspecific white matter changes are noted within cerebral hemispheres bilaterally. While nonspecific, white matter changes can be seen with small-vessel ischemic change or demyelinating processes among others.         Assessment and Plan     Assessment: Edwar Hemphill is a 39 y.o. year old male patient with hx of ESRD on HD (MWF, right femoral line), HFrEF (EF 35-40% 5/2024), T2DM, PAD s/p L AKA and R above elbow amputation with multiple hospitalizations for L AKA infection and MRSA bacteremia  admitted on 7/22/2024 with septic shock admitted     Restraints: no    Summary for 08/14/24  :  Seen by Ortho Hand for assessment of right wrist incision site from I/D on 7/29.  Wound reinforced with steri strips. Re-aspiration attempted x1 unsuccessfully and patient refusing additional attempts.  No purulent drainage noted, thinking likely hematoma.  Aggressively elevate LUE and notify Ortho Hand team should purulent drainage be noted from site.   IR: Risk of losing access and infection risk are too high. As line is currently working, no indication to exchange as it is functioning appropriately and it is his only functioning site of access.  iHD today  Consult Vascular surgery to evaluate for any further interventions   Midodrine increased to 15mg TID d/t hypotension   Consider re-consulting palliative care for GOC discussion    Plan:  NEUROLOGY/PSYCH:  Dx: Acute left wrist pain.  Hallucinations  - A&Ox3 this morning.  Patient forgetful as to exact clinical course and multiple different medical conditions.  Hallucinations overnight.  Management:  Pain reg: tylenol PRN, oxycodone prn, and Dilaudid prn   Melatonin HS  Started Seroquel HS   PT/OT/OOB  Delirium Protocol      CARDIOVASCULAR:  Dx: Infective  endocarditis with TV vegetation, Severely attenuated superior vena cava, bilateral brachiocephalic, subclavian and internal jugular veins with the calcifications in the brachiocephalic veins with extensive collaterals in the chest wall likely representing chronic thrombosis.  Septic shock, off pressors since 8/4.  Persistent hypotension (asymptomatic).  Hx of HLD  Management:  Home Meds: Atorvastatin 40 mg daily, Midodrine 10 mg QID  Patient had vegectomy aborted due to to concerns for infected thrombus in the IVC.   Continue Atorvastatin 40 mg daily  Midodrine increased back to 15mg TID today.  Previously 10 mg bid during day with 15mg hs      PULMONARY:  Dx:No acute issues   Management:  Stable on RA     RENAL/GENITOURINARY:  Dx: Hx ESRD on iHD MWF   Management:  Last iHD on 8/12  Plan for next iHD 8/14     GASTROENTEROLOGY:  Dx: Hx of GERD, Esophagitis, Recent GIB (6/2024) s/p clipping x2. Constipation, resolving.    Poor PO intake  Management:  Diet: Regular  Bowel Regimen: Dulcolax suppository daily, Aracelis-colace BID, and Miralax BID   GI recommending BID PPI x1 month then switch to daily indefinitely. Will need to follow up with Dr. No Kimball as O/p      ENDOCRINOLOGY:  Dx: DM, Hypothyroid.  Euvolemic DKA, resolved, now hypoglycemia d/t poor appetite  Management:  A1C: 6.8 3(3/2024)  Blood sugars 60s-120s  Required Insulin gtt, off since 8/11 after AG closed  Hold Glargine 10 U HS d/t lower sugars 2/2 decreased appetite   Continue  SSI, accu checks and hypoglycemia protocol   Continue Levothyroxine 150mg daily. TSH 7.48, Free T4 1.01      HEMATOLOGY:  Dx: Anema of chronic disease, Thrombocytopenia, Chronic thrombosis of IVC, SVC, blanca brachiocephalic, Subclavian, internal jugular and chronic changes in L CFV & Prox fem veins  Required 2 U pRBC on 7/24 and 7/29.   Hgb 8.6 today, s/p 1 unit PRBC 8/13 for hgb 6.9   Vascular Surgery consulted  Vascular Medicine following, recs appreciated   Started on heparin drip  7/31 and now transitioned to Coumadin after INR therapeutic x2 on 8/11 (2.3) and 8/12 (3.0) .  Heparin gtt stopped 8/12  Management:  Continue Coumadin 2.5 mg  Monitor for bleeding given recent GIB  MRI head with no evidence of bleeding or mycotic aneurysm   Daily INR monitor, goal INR 2-3     MUSCULOSKELETAL/ SKIN:  Dx: L wrist septic arthritis, s/p I&D 7/29 with ortho  Ortho hand contacted 8/13.  Seen today.  Unsuccessful attempt to aspirate fluid from joint.  As no purulent drainage noted thinking likely hematoma.         Management   Aggressively elevate LUE  Continue to monitor for purulent drainage from right wrist site. Notify Ortho hand team if purulent drainage noted   Keep ortho hand team notified of anticipated discharge date        INFECTIOUS DISEASE:  Dx: MRSA bacteremia, Infective Endocarditis (TV),  L wrist septic arthritis s/p I&D (7/29), septic shock (resolved).  Hx of Acinetobacter  Management:  Tmax: 37.2 C, WBC 6.1  MICRO: 7/22 and 7/25 BC with MRSA, 7/27 BC Staph Haemolyticus; 7/27 synovial fluid from Left wrist MRSA, BC 7/29 and 7/31 Neg, 7/29 Fungal cx (tissue) pending, Bacterial cx neg  Antimicrobials: Continue vancomycin post-HD through end date 9/13   Will instill vanc into HD cath after each HD session  Will trial indefinite suppression with Bactrim 1SS daily after vancomycin course (ID follow up on 9/10)   Follow up with ID, Dr Doroteo Graham 9/10     ICU Check List       FEN  Fluids: PRN  Electrolytes: PRN  Nutrition: Regular Diet  Prophylaxis:  DVT ppx: Coumadin  GI ppx: PPI BID  Bowel care: Dulcolax suppository daily, Miralax BID, and Aracelis-Colace BID  Hardware:  Catheter: none  Access: Right femoral tunneled HD line  Lines: Left EJ   Social:  Code: Full Code    HPOA: Shanthi Hemphill, Parent, [phone]   Disposition: Continue in SDU care for tx  Discharge: Plan to return to [facility]    Delvin Gomez, AMARILYS-CNP   08/14/24 at 7:04 AM     Pt discussed with Dr. Medina, seen and  examined. All labs, VS and previous plan of care reviewed.    Disclaimer: Documentation completed with the information available at the time of input. The times in the chart may not be reflective of actual patient care times, interventions, or procedures. Documentation occurs after the physical care of the patient.

## 2024-08-14 NOTE — PROGRESS NOTES
Subjective     Interval History: Edwar Hemphill has no new complaints.  Had short HD today, limited by hypotension.    Medications    Current Facility-Administered Medications:     acetaminophen (Tylenol) tablet 650 mg, 650 mg, oral, q4h PRN, 650 mg at 08/02/24 2059 **OR** [DISCONTINUED] acetaminophen (Tylenol) oral liquid 650 mg, 650 mg, nasogastric tube, q4h PRN **OR** [DISCONTINUED] acetaminophen (Tylenol) suppository 650 mg, 650 mg, rectal, q4h PRN, Blaine Gonzalez MD    alteplase (Cathflo Activase) injection 1 mg, 1 mg, intra-catheter, PRN, Leilani Gabriel, APRN-CNP, 1 mg at 08/12/24 1548    atorvastatin (Lipitor) tablet 40 mg, 40 mg, oral, Nightly, Alex Caraballo MD, 40 mg at 08/13/24 2132    B complex-vitamin C-folic acid (Nephrocaps) capsule 1 capsule, 1 capsule, oral, Daily, Alex Caraballo MD, 1 capsule at 08/14/24 0816    bisacodyl (Dulcolax) suppository 10 mg, 10 mg, rectal, Daily, Lupe Lopez, APRN-CNP, 10 mg at 08/09/24 1037    cholecalciferol (Vitamin D-3) capsule 50,000 Units, 50,000 Units, oral, Once per day on Monday Thursday, Alex Caraballo MD, 50,000 Units at 08/12/24 0822    dextrose 50 % injection 12.5 g, 12.5 g, intravenous, q15 min PRN, Alex Caraballo MD, 12.5 g at 08/12/24 1446    dextrose 50 % injection 25 g, 25 g, intravenous, q15 min PRN, Alex Caraballo MD, 25 g at 07/31/24 1730    diphenhydrAMINE (BENADryl) capsule 25 mg, 25 mg, oral, q6h PRN, Alex Caraballo MD, 25 mg at 07/24/24 1525    epoetin tyson (Epogen,Procrit) injection 10,000 Units, 10,000 Units, subcutaneous, Once per day on Monday Wednesday Friday, Alex Caraballo MD, 10,000 Units at 08/14/24 0816    folic acid (Folvite) tablet 1 mg, 1 mg, oral, Daily, Alex Caraballo MD, 1 mg at 08/14/24 0815    glucagon (Glucagen) injection 1 mg, 1 mg, intramuscular, q15 min PRN, Alex Caraballo MD    glucagon (Glucagen) injection 1 mg, 1 mg, intramuscular, q15 min PRN, Alex Caraballo MD    HYDROmorphone (Dilaudid) injection 0.1 mg, 0.1 mg,  intravenous, q3h PRN, Alex Caraballo MD, 0.1 mg at 08/08/24 1130    [Held by provider] insulin glargine (Lantus) injection 10 Units, 10 Units, subcutaneous, Nightly, OSBALDO Antonio, 10 Units at 08/11/24 2105    insulin lispro (HumaLOG) injection 0-15 Units, 0-15 Units, subcutaneous, Before meals & nightly, OSBALDO Antonio, 3 Units at 08/14/24 1411    levothyroxine (Synthroid, Levoxyl) tablet 150 mcg, 150 mcg, oral, Daily before breakfast, Alex Caraballo MD, 150 mcg at 08/14/24 0557    melatonin tablet 6 mg, 6 mg, oral, Nightly, Alex Caraballo MD, 6 mg at 08/13/24 2132    midodrine (Proamatine) tablet 15 mg, 15 mg, oral, TID, OSBLADO Potter, 15 mg at 08/14/24 1614    nystatin (Mycostatin) 100,000 unit/gram powder 1 Application, 1 Application, Topical, BID, Alex Caraballo MD, 1 Application at 08/14/24 0818    ondansetron (Zofran) injection 4 mg, 4 mg, intravenous, q6h PRN, Gerardo Foreman MD, 4 mg at 08/07/24 0838    oxyCODONE (Roxicodone) immediate release tablet 5 mg, 5 mg, oral, q4h PRN, Alex Caraballo MD, 5 mg at 08/13/24 2143    pantoprazole (ProtoNix) EC tablet 40 mg, 40 mg, oral, BID AC, OSBALDO Potter, 40 mg at 08/14/24 1614    [START ON 9/6/2024] pantoprazole (ProtoNix) EC tablet 40 mg, 40 mg, oral, Daily before breakfast, OSBALDO Potter    polyethylene glycol (Glycolax, Miralax) packet 17 g, 17 g, oral, BID, OSBALDO Potter, 17 g at 08/14/24 0817    QUEtiapine (SEROquel) tablet 12.5 mg, 12.5 mg, oral, Nightly, Delvin Gomez, APRN-CNP    sennosides-docusate sodium (Aracelis-Colace) 8.6-50 mg per tablet 1 tablet, 1 tablet, oral, BID, Lupe Lopez, APRN-CNP, 1 tablet at 08/14/24 0814    thiamine (Vitamin B-1) tablet 200 mg, 200 mg, oral, Daily, Alex Caraballo MD, 200 mg at 08/14/24 0814    vancomycin (Vancocin) in dextrose 5 % water (D5W) 250 mL IV 1,250 mg, 1,250 mg, intravenous, Once, Blaine Gonzalez MD    vancomycin (Vancocin) pharmacy to  dose - pharmacy monitoring, , miscellaneous, Daily PRN, Alex Caraballo MD    vancomycin 5 mg/mL + heparin 2500 units/mL in NS lock, 2 mL, intra-catheter, q48h, Paula Llanes, APRN-CNP, 2 mL at 08/14/24 1424    warfarin (Coumadin) tablet 2.5 mg, 2.5 mg, oral, Daily, Delvin DAWKINS Patricia, APRN-CNP, 2.5 mg at 08/13/24 1732    Objective     Physical Exam  Heart S1 S2 RRR, Lungs CTA, + edema      Vital signs in last 24 hours:  Temp:  [36 °C (96.8 °F)-36.8 °C (98.2 °F)] 36.7 °C (98.1 °F)  Heart Rate:  [81-95] 88  Resp:  [16] 16  BP: ()/(44-67) 89/54       Intake/Output last 3 shifts:  I/O last 3 completed shifts:  In: 3634.8 (51.3 mL/kg) [I.V.:1600 (22.6 mL/kg); Blood:434.8; Other:1600]  Out: 230 (3.2 mL/kg) [Urine:50 (0 mL/kg/hr); Other:180]  Dosing Weight: 70.9 kg     Labs:  Results from last 7 days   Lab Units 08/14/24  0448   WBC AUTO x10*3/uL 6.1   RBC AUTO x10*6/uL 2.98*   HEMOGLOBIN g/dL 8.6*   HEMATOCRIT % 27.2*     Results from last 7 days   Lab Units 08/14/24  0448 08/10/24  2051 08/10/24  0857   SODIUM mmol/L 132*   < > 132*   POTASSIUM mmol/L 3.9   < > 4.2   CHLORIDE mmol/L 95*   < > 94*   CO2 mmol/L 28   < > 18*   BUN mg/dL 16   < > 18   CREATININE mg/dL 3.49*   < > 3.09*   CALCIUM mg/dL 8.9   < > 8.9   PHOSPHORUS mg/dL 4.2   < > 4.4   MAGNESIUM mg/dL 1.95   < >  --    BILIRUBIN TOTAL mg/dL  --   --  1.0   ALT U/L  --   --  8*   AST U/L  --   --  8*    < > = values in this interval not displayed.       Assessment/Plan     Principal Problem:    Septic shock (Multi)  Active Problems:    Acute infective endocarditis (HHS-HCC)    Tricuspid valve vegetation (HHS-HCC)    Staphylococcal arthritis of left wrist (Multi)    Infection of left wrist (Multi)    ESRD patient with multiple medical problems, femoral dialysis catheter, on abx for bacteremia.  BP has remained low, has edema on exam, though on room air.  Per discussion with primary team, [facility] would require ICU transfer and beds not currently available.  Will  assess for UF on 8/15 as tolerated    Karishma Mcmanus MD  8/14/2024  4:18 PM

## 2024-08-15 ENCOUNTER — COMMITTEE REVIEW (OUTPATIENT)
Dept: TRANSPLANT | Facility: HOSPITAL | Age: 39
End: 2024-08-15
Payer: COMMERCIAL

## 2024-08-15 ENCOUNTER — APPOINTMENT (OUTPATIENT)
Dept: DIALYSIS | Facility: HOSPITAL | Age: 39
End: 2024-08-15
Payer: COMMERCIAL

## 2024-08-15 LAB
ALBUMIN SERPL BCP-MCNC: 2.5 G/DL (ref 3.4–5)
ANION GAP SERPL CALC-SCNC: 14 MMOL/L (ref 10–20)
BUN SERPL-MCNC: 14 MG/DL (ref 6–23)
CALCIUM SERPL-MCNC: 8.7 MG/DL (ref 8.6–10.6)
CHLORIDE SERPL-SCNC: 98 MMOL/L (ref 98–107)
CO2 SERPL-SCNC: 27 MMOL/L (ref 21–32)
CREAT SERPL-MCNC: 3.36 MG/DL (ref 0.5–1.3)
EGFRCR SERPLBLD CKD-EPI 2021: 23 ML/MIN/1.73M*2
ERYTHROCYTE [DISTWIDTH] IN BLOOD BY AUTOMATED COUNT: 19.1 % (ref 11.5–14.5)
GLUCOSE BLD MANUAL STRIP-MCNC: 126 MG/DL (ref 74–99)
GLUCOSE BLD MANUAL STRIP-MCNC: 187 MG/DL (ref 74–99)
GLUCOSE BLD MANUAL STRIP-MCNC: 316 MG/DL (ref 74–99)
GLUCOSE BLD MANUAL STRIP-MCNC: 81 MG/DL (ref 74–99)
GLUCOSE SERPL-MCNC: 294 MG/DL (ref 74–99)
HCT VFR BLD AUTO: 27.5 % (ref 41–52)
HGB BLD-MCNC: 8.6 G/DL (ref 13.5–17.5)
INR PPP: 2.7 (ref 0.9–1.1)
MAGNESIUM SERPL-MCNC: 1.91 MG/DL (ref 1.6–2.4)
MCH RBC QN AUTO: 29.3 PG (ref 26–34)
MCHC RBC AUTO-ENTMCNC: 31.3 G/DL (ref 32–36)
MCV RBC AUTO: 94 FL (ref 80–100)
NRBC BLD-RTO: 0 /100 WBCS (ref 0–0)
PHOSPHATE SERPL-MCNC: 3.7 MG/DL (ref 2.5–4.9)
PLATELET # BLD AUTO: 81 X10*3/UL (ref 150–450)
POTASSIUM SERPL-SCNC: 4 MMOL/L (ref 3.5–5.3)
PROTHROMBIN TIME: 30.9 SECONDS (ref 9.8–12.8)
RBC # BLD AUTO: 2.94 X10*6/UL (ref 4.5–5.9)
SODIUM SERPL-SCNC: 135 MMOL/L (ref 136–145)
WBC # BLD AUTO: 5.9 X10*3/UL (ref 4.4–11.3)

## 2024-08-15 PROCEDURE — 83735 ASSAY OF MAGNESIUM: CPT

## 2024-08-15 PROCEDURE — 8010000001 HC DIALYSIS - HEMODIALYSIS PER DAY

## 2024-08-15 PROCEDURE — 2500000002 HC RX 250 W HCPCS SELF ADMINISTERED DRUGS (ALT 637 FOR MEDICARE OP, ALT 636 FOR OP/ED): Performed by: NURSE PRACTITIONER

## 2024-08-15 PROCEDURE — 2500000001 HC RX 250 WO HCPCS SELF ADMINISTERED DRUGS (ALT 637 FOR MEDICARE OP): Performed by: NURSE PRACTITIONER

## 2024-08-15 PROCEDURE — 99232 SBSQ HOSP IP/OBS MODERATE 35: CPT | Performed by: NURSE PRACTITIONER

## 2024-08-15 PROCEDURE — 2060000001 HC INTERMEDIATE ICU ROOM DAILY

## 2024-08-15 PROCEDURE — 99233 SBSQ HOSP IP/OBS HIGH 50: CPT

## 2024-08-15 PROCEDURE — 2500000001 HC RX 250 WO HCPCS SELF ADMINISTERED DRUGS (ALT 637 FOR MEDICARE OP)

## 2024-08-15 PROCEDURE — 85610 PROTHROMBIN TIME: CPT

## 2024-08-15 PROCEDURE — 82947 ASSAY GLUCOSE BLOOD QUANT: CPT

## 2024-08-15 PROCEDURE — 99497 ADVNCD CARE PLAN 30 MIN: CPT

## 2024-08-15 PROCEDURE — 2500000004 HC RX 250 GENERAL PHARMACY W/ HCPCS (ALT 636 FOR OP/ED)

## 2024-08-15 PROCEDURE — 2500000002 HC RX 250 W HCPCS SELF ADMINISTERED DRUGS (ALT 637 FOR MEDICARE OP, ALT 636 FOR OP/ED)

## 2024-08-15 PROCEDURE — 80069 RENAL FUNCTION PANEL: CPT | Performed by: NURSE PRACTITIONER

## 2024-08-15 PROCEDURE — 36415 COLL VENOUS BLD VENIPUNCTURE: CPT

## 2024-08-15 PROCEDURE — 99233 SBSQ HOSP IP/OBS HIGH 50: CPT | Performed by: INTERNAL MEDICINE

## 2024-08-15 PROCEDURE — 85027 COMPLETE CBC AUTOMATED: CPT | Performed by: NURSE PRACTITIONER

## 2024-08-15 ASSESSMENT — COGNITIVE AND FUNCTIONAL STATUS - GENERAL
MOBILITY SCORE: 8
MOVING TO AND FROM BED TO CHAIR: TOTAL
EATING MEALS: A LITTLE
DRESSING REGULAR LOWER BODY CLOTHING: TOTAL
DAILY ACTIVITIY SCORE: 8
MOVING FROM LYING ON BACK TO SITTING ON SIDE OF FLAT BED WITH BEDRAILS: A LOT
DRESSING REGULAR UPPER BODY CLOTHING: A LOT
TURNING FROM BACK TO SIDE WHILE IN FLAT BAD: TOTAL
DRESSING REGULAR LOWER BODY CLOTHING: A LOT
STANDING UP FROM CHAIR USING ARMS: TOTAL
MOVING FROM LYING ON BACK TO SITTING ON SIDE OF FLAT BED WITH BEDRAILS: TOTAL
EATING MEALS: A LOT
MOVING TO AND FROM BED TO CHAIR: TOTAL
WALKING IN HOSPITAL ROOM: TOTAL
HELP NEEDED FOR BATHING: A LOT
MOBILITY SCORE: 6
DRESSING REGULAR UPPER BODY CLOTHING: TOTAL
HELP NEEDED FOR BATHING: TOTAL
PERSONAL GROOMING: A LOT
CLIMB 3 TO 5 STEPS WITH RAILING: TOTAL
CLIMB 3 TO 5 STEPS WITH RAILING: TOTAL
TOILETING: TOTAL
TOILETING: A LOT
TURNING FROM BACK TO SIDE WHILE IN FLAT BAD: A LOT
PERSONAL GROOMING: A LOT
WALKING IN HOSPITAL ROOM: TOTAL
DAILY ACTIVITIY SCORE: 13
STANDING UP FROM CHAIR USING ARMS: TOTAL

## 2024-08-15 ASSESSMENT — PAIN SCALES - GENERAL
PAINLEVEL_OUTOF10: 0 - NO PAIN

## 2024-08-15 ASSESSMENT — PAIN - FUNCTIONAL ASSESSMENT
PAIN_FUNCTIONAL_ASSESSMENT: 0-10
PAIN_FUNCTIONAL_ASSESSMENT: NO/DENIES PAIN

## 2024-08-15 NOTE — PROGRESS NOTES
"Medical Intensive Care Step down - Daily Progress Note   Subjective    Edwar Hemphill is a 39 y.o. year old male patient admitted on 7/22/2024 with septic shock     Interval History:  Patient with episodes of confusion.  Was refusing to take PO meds, but was able to redirect, and patient agreed to take meds.  SBP has been 80s.  Hyperglycemia with BS >300 this am, patient noted to have bedside table filled with multiple candy and chocolate  Meds    Scheduled medications  atorvastatin, 40 mg, oral, Nightly  B complex-vitamin C-folic acid, 1 capsule, oral, Daily  bisacodyl, 10 mg, rectal, Daily  cholecalciferol, 50,000 Units, oral, Once per day on Monday Thursday  epoetin tyson or biosimilar, 10,000 Units, subcutaneous, Once per day on Monday Wednesday Friday  folic acid, 1 mg, oral, Daily  [Held by provider] insulin glargine, 10 Units, subcutaneous, Nightly  insulin lispro, 0-15 Units, subcutaneous, Before meals & nightly  levothyroxine, 150 mcg, oral, Daily before breakfast  melatonin, 6 mg, oral, Nightly  midodrine, 15 mg, oral, TID  nystatin, 1 Application, Topical, BID  pantoprazole, 40 mg, oral, BID AC  [START ON 9/6/2024] pantoprazole, 40 mg, oral, Daily before breakfast  polyethylene glycol, 17 g, oral, BID  QUEtiapine, 12.5 mg, oral, Nightly  sennosides-docusate sodium, 1 tablet, oral, BID  thiamine, 200 mg, oral, Daily  vancomycin 5 mg/mL + heparin 2500 units/mL in NS lock, 2 mL, intra-catheter, q48h  warfarin, 2.5 mg, oral, Daily      Continuous medications     PRN medications  PRN medications: acetaminophen **OR** [DISCONTINUED] acetaminophen **OR** [DISCONTINUED] acetaminophen, alteplase, dextrose, dextrose, diphenhydrAMINE, glucagon, glucagon, HYDROmorphone, ondansetron, oxyCODONE, vancomycin     Objective    Blood pressure 88/65, pulse 89, temperature 36 °C (96.8 °F), resp. rate 16, height 1.753 m (5' 9.02\"), weight 72.1 kg (158 lb 15.2 oz), SpO2 94%.     Physical Exam:  Constitutional: chronically ill " appearing patient, in NAD, alert but episodes of confusion, requiring redirection   Eyes: PERRL, EOMI, no icterus   ENMT: mucous membranes moist, no apparent injury, no lesions seen  Head/Neck: Neck supple, no apparent injury  Respiratory/Thorax: Lungs CTA bilaterally, non-labored breathing, no cough, on RA  Cardiovascular: Regular, rate and rhythm, no murmurs  Gastrointestinal: Nondistended, soft, non-tender, BS present x 4  Musculoskeletal: RUE amputation (healed), L AKA (healed).  L wrist surgical site, tender to touch, surgical incision, well approximated, no signs of infection, redressed with petroleum gauze and kerlix  Extremities: no edema, contusions or wounds  Neurological: alert and oriented x 3, speech clear, follows commands appropriately, good strength LUE and RLE  Skin: Warm and dry, R fem HD line sacral ulcer, right heel ulcer        Intake/Output Summary (Last 24 hours) at 8/15/2024 1527  Last data filed at 8/15/2024 0012  Gross per 24 hour   Intake 402 ml   Output --   Net 402 ml     Labs:   Results from last 72 hours   Lab Units 08/15/24  0516 08/14/24  0448 08/13/24  0430   SODIUM mmol/L 135* 132* 132*   POTASSIUM mmol/L 4.0 3.9 3.6   CHLORIDE mmol/L 98 95* 96*   CO2 mmol/L 27 28 28   BUN mg/dL 14 16 13   CREATININE mg/dL 3.36* 3.49* 2.61*   GLUCOSE mg/dL 294* 168* 106*   CALCIUM mg/dL 8.7 8.9 8.4*   ANION GAP mmol/L 14 13 12   EGFR mL/min/1.73m*2 23* 22* 31*   PHOSPHORUS mg/dL 3.7 4.2 3.4      Results from last 72 hours   Lab Units 08/15/24  0516 08/14/24  0448 08/13/24  0430   WBC AUTO x10*3/uL 5.9 6.1 7.2   HEMOGLOBIN g/dL 8.6* 8.6* 6.9*   HEMATOCRIT % 27.5* 27.2* 21.7*   PLATELETS AUTO x10*3/uL 81* 78* 73*        Micro/ID:     Lab Results   Component Value Date    BLOODCULT No growth at 4 days -  FINAL REPORT 07/31/2024    BLOODCULT No growth at 4 days -  FINAL REPORT 07/31/2024       Summary of key imaging results from the last 24 hours  8/11 MR brain wo IV contrast: no MRI evidence of acute  infarction on the diffusion weighted images.   There is again evidence of mild-to-moderate brain parenchymal volume loss.   Mild nonspecific white matter changes are noted within cerebral hemispheres bilaterally. While nonspecific, white matter changes can be seen with small-vessel ischemic change or demyelinating processes among others.         Assessment and Plan     Assessment: Edwar Hemphill is a 39 y.o. year old male patient with hx of ESRD on HD (MWF, right femoral line), HFrEF (EF 35-40% 5/2024), T2DM, PAD s/p L AKA and R above elbow amputation with multiple hospitalizations for L AKA infection and MRSA bacteremia  admitted on 7/22/2024 with septic shock admitted     Restraints: no    Summary for 08/15/24  :  Left wrist site hematoma stable,  was reevaluated by ortho on 8/14.  Previously had I/D on 7/29 with ortho hand.  Dressing is intact.  Ortho continuous to follow   IR evaluated on 8/14- Risk of losing access and infection risk are too high. As line is currently working, no indication to exchange as it is functioning appropriately and it is his only functioning site of access.  Seen by Vascular surgery on 8/14 - no further interventions indicated  Hypotension, SBP 80s, Midodrine was increased to 15 mg TID on 8/14  Nephrology initially considered TABLO (which would require transfer to ICU), but attempting HD on the floor today with goal SBP >75 and administer Albumen If needed  Pal care re consulted, discussing GOC with mother d/t minimal treatment options available for the patient.  Patient is still full code but will need ongoing discussions      Plan:  NEUROLOGY/PSYCH:  Dx: Acute left wrist pain.  Delirium, confusion   Management:  Pain reg: tylenol PRN, oxycodone prn, and Dilaudid prn   Melatonin HS  Started Seroquel HS   PT/OT/OOB  Delirium Protocol      CARDIOVASCULAR:  Dx: Infective endocarditis with TV vegetation, Severely attenuated superior vena cava, bilateral brachiocephalic, subclavian and  internal jugular veins with the calcifications in the brachiocephalic veins with extensive collaterals in the chest wall likely representing chronic thrombosis.  Septic shock, off pressors since 8/4.  Persistent hypotension (asymptomatic).  Hx of HLD  Management:  Home Meds: Atorvastatin 40 mg daily, Midodrine 10 mg QID  Patient had vegectomy aborted due to to concerns for infected thrombus in the IVC.   Continue Atorvastatin 40 mg daily  Midodrine 15mg TID (dose was increased on 8/15)       PULMONARY:  Dx: No acute issues   Management:  Stable on RA     RENAL/GENITOURINARY:  Dx: Hx ESRD on iHD MWF   Management:  Last iHD on 8/12  Not able to dialyze 8/14, plan to attempt today if BP tolerates      GASTROENTEROLOGY:  Dx: Hx of GERD, Esophagitis, Recent GIB (6/2024) s/p clipping x2. Constipation, resolved.    Poor PO intake  Management:  Diet: Regular  Bowel Regimen: Dulcolax suppository daily, Aracelis-colace BID, and Miralax BID   GI recommending BID PPI x1 month then switch to daily indefinitely. Will need to follow up with Dr. No Kimball as O/p      ENDOCRINOLOGY:  Dx: DM, Hypothyroid.  Euvolemic DKA, resolved, episodes of hypoglycemia d/t poor appetite, and hyperglycemia d/t poor adherence to the diet  Management:  A1C: 6.8 3(3/2024)  Blood sugars 119-316  Required Insulin gtt, off since 8/11 after AG closed  Hold Glargine 10 U HS d/t lower sugars 2/2 decreased appetite   Continue  SSI, accu checks and hypoglycemia protocol   Continue Levothyroxine 150mg daily. TSH 7.48, Free T4 1.01      HEMATOLOGY:  Dx: Anema of chronic disease, Thrombocytopenia, Chronic thrombosis of IVC, SVC, blanca brachiocephalic, Subclavian, internal jugular and chronic changes in L CFV & Prox fem veins  Required 2 U pRBC on 7/24 and 7/29.   Hgb 8.6 today, s/p 1 unit PRBC 8/13 for hgb 6.9   No surgical options available as per Vascular Surgery recs from  8/14  Vascular Medicine following, for management of AC  Started on heparin drip 7/31 and  now transitioned to Coumadin, Heparin gtt stopped 8/12  Management:  Continue Coumadin 2.5 mg  Monitor for bleeding given recent GIB  MRI head with no evidence of bleeding or mycotic aneurysm   Daily INR monitor, goal INR 2-3     MUSCULOSKELETAL/ SKIN:  Dx: L wrist septic arthritis, s/p I&D 7/29 with ortho  Ortho hand contacted 8/13.  Reevaluated 8/14.  Unsuccessful attempt to aspirate fluid from joint.  As no purulent drainage noted thinking likely hematoma.         Management   Aggressively elevate LUE  Continue to monitor for purulent drainage from right wrist site. Notify Ortho hand team if purulent drainage noted   Keep ortho hand team notified of anticipated discharge date        INFECTIOUS DISEASE:  Dx: MRSA bacteremia, Infective Endocarditis (TV),  L wrist septic arthritis s/p I&D (7/29), septic shock (resolved).  Hx of Acinetobacter  Management:  Tmax: 36.8 C, WBC 5.9  MICRO: 7/22 and 7/25 BC with MRSA, 7/27 BC Staph Haemolyticus; 7/27 synovial fluid from Left wrist MRSA, BC 7/29 and 7/31 Neg, 7/29 Fungal cx (tissue) pending, Bacterial cx neg  Antimicrobials: Continue vancomycin post-HD through end date 9/13   Will instill vanc into HD cath after each HD session  Will trial indefinite suppression with Bactrim 1SS daily after vancomycin course (ID follow up on 9/10)   Follow up with ID, Dr Doroteo Graham 9/10     ICU Check List       FEN  Fluids: PRN  Electrolytes: PRN  Nutrition: Regular diabetic Diet  Prophylaxis:  DVT ppx: Coumadin  GI ppx: PPI BID  Bowel care: Dulcolax suppository daily, Miralax BID, and Aracelis-Colace BID  Hardware:  Catheter: none  Access: Right femoral tunneled HD line  Lines: Left EJ   Social:  Code: Full Code  (ongoing [facility] discussions with mother)  HPOA: Shanthi Hemphill, Parent, [phone]   Disposition: Continue in SDU care for tx  Discharge: Plan to return to [facility].      AMARILYS Corbett-CNP   08/15/24 at 3:27 PM     Pt discussed with Dr. Medina, seen and examined. All  labs, VS and previous plan of care reviewed.    Disclaimer: Documentation completed with the information available at the time of input. The times in the chart may not be reflective of actual patient care times, interventions, or procedures. Documentation occurs after the physical care of the patient.

## 2024-08-15 NOTE — LETTER
08/20/24    Edwar Hemphill  [id number] [address] [id number]    RE:  Evaluation for Transplantation     Dear Edwar,     In compliance with the regulations of the [employer] ([employer]), this letter is to inform you of the [facility] Kidney Transplant Service’s decision regarding your transplant candidacy.  The Transplant Patient Selection Committee met on 8/15/2024 to discuss the results of your transplant evaluation. Based on the results, we sincerely regret that we cannot offer transplantation as a treatment option for your organ failure at this time.  This determination is based on your inoperable infected endocarditis.     I know that this is not the outcome that you would have liked; however, we need to recommend what we feel is safest for your long-term survival and quality of life.? Please feel free to call the pre-kidney transplant office if you have any questions or concerns.? You may reach us at [phone]. Attached is a letter from the [employer] ([employer]).  It describes the services and information offered to patients by [employer] and the Organ Procurement and Transplantation Network.     Additionally, you are welcome to be evaluated at an alternate transplant center.? Your dialysis unit or nephrologist may assist you with this process.     Sincerely,     The Kidney and Pancreas Transplant Team                  CC: Mauricio Estrada MD; Wesley Barnett MD     Dialysis Unit: [facility]    Enclosure: [facility] Patient Information Letter

## 2024-08-15 NOTE — PROGRESS NOTES
"Edwar Hemphill is a 39 y.o. male on day 24 of admission.   Medical history significant for anemia of chronic disease, ESRD on HD via right femoral line, HFrEF, multiple line-related DVTs (BIJ, BUE, BLE treated with Eliquis prior to recent upper GIB 6/15/24), NICM, paroxsymal a-fib, PFO, PVD (s/p left AKA, right forearm amputation), T2DM, tobacco use.      Admitted to MICU for treatment of septic shock due to finding of infective endocarditis with TV vegetation due to HD line infection (line in right femoral).      Vascular Medicine Service initially consulted 7/23/24 for anticoagulation recommendations given recurrent line-related VTEs. Vascular Medicine Service re-consulted 8/6/24 for home going anticoagulation recommendations.   Patient with continued thrombocytopenia, and HIT has been ruled out.   Continues with as needed Warfarin dose adjustment to maintain INR 2-3.         Subjective   Denies CP, SOB or bleeding.      Objective   Continues with stable INR.     Physical Exam  Resting in bed in NAD  Respirations full and easy; breath sounds CTA; on RA  Normal heart sounds; NSR seen on cardiac monitor; vital signs stable   Abdomen soft and nontender to palpation.  Extremities warm to touch; unable to obtain DP pulse with palpation, but able to obtain + doppler signal of right DP pulse; palpable left radial pulse; increased +2 edema of RLE from top of foot to above the calf; left hand with Kerlix wrap; right femoral HD line in place with ports capped; LEJ line in place and capped.    Patient is drowsy, able to participate a little in conversation; appropriate affect today.     Last Recorded Vitals  Blood pressure 84/61, pulse 92, temperature 36 °C (96.8 °F), resp. rate 16, height 1.753 m (5' 9.02\"), weight 72.1 kg (158 lb 15.2 oz), SpO2 95%.  Intake/Output last 3 Shifts:  I/O last 3 completed shifts:  In: 2202 (31.1 mL/kg) [I.V.:752 (10.6 mL/kg); Other:1200; IV Piggyback:250]  Out: 0.2 (0 mL/kg) [Other:0.2]  Dosing " Weight: 70.9 kg     Relevant Results  Scheduled medications  atorvastatin, 40 mg, oral, Nightly  B complex-vitamin C-folic acid, 1 capsule, oral, Daily  bisacodyl, 10 mg, rectal, Daily  cholecalciferol, 50,000 Units, oral, Once per day on Monday Thursday  epoetin tyson or biosimilar, 10,000 Units, subcutaneous, Once per day on Monday Wednesday Friday  folic acid, 1 mg, oral, Daily  [Held by provider] insulin glargine, 10 Units, subcutaneous, Nightly  insulin lispro, 0-15 Units, subcutaneous, Before meals & nightly  levothyroxine, 150 mcg, oral, Daily before breakfast  melatonin, 6 mg, oral, Nightly  midodrine, 15 mg, oral, TID  nystatin, 1 Application, Topical, BID  pantoprazole, 40 mg, oral, BID AC  [START ON 9/6/2024] pantoprazole, 40 mg, oral, Daily before breakfast  polyethylene glycol, 17 g, oral, BID  QUEtiapine, 12.5 mg, oral, Nightly  sennosides-docusate sodium, 1 tablet, oral, BID  thiamine, 200 mg, oral, Daily  vancomycin 5 mg/mL + heparin 2500 units/mL in NS lock, 2 mL, intra-catheter, q48h  warfarin, 2.5 mg, oral, Daily      Results from last 7 days   Lab Units 08/15/24  0516 08/14/24 0448 08/13/24  0430   WBC AUTO x10*3/uL 5.9 6.1 7.2   HEMOGLOBIN g/dL 8.6* 8.6* 6.9*   HEMATOCRIT % 27.5* 27.2* 21.7*   PLATELETS AUTO x10*3/uL 81* 78* 73*       Results from last 7 days   Lab Units 08/15/24  0516 08/14/24 0448 08/13/24  0430   SODIUM mmol/L 135* 132* 132*   POTASSIUM mmol/L 4.0 3.9 3.6   CHLORIDE mmol/L 98 95* 96*   CO2 mmol/L 27 28 28   BUN mg/dL 14 16 13   CREATININE mg/dL 3.36* 3.49* 2.61*   GLUCOSE mg/dL 294* 168* 106*   CALCIUM mg/dL 8.7 8.9 8.4*       Results from last 7 days   Lab Units 08/15/24  0516 08/14/24  0448 08/13/24  0430   INR  2.7* 2.3* 2.5*       Results from last 7 days   Lab Units 08/12/24  0350 08/11/24  2252 08/11/24  1809   ANTI XA UNFRACTIONATED IU/mL 0.4 0.4 0.2                 Assessment/Plan   Assessment & Plan  Septic shock (Multi)    Acute infective endocarditis  "([facility])    Tricuspid valve vegetation ([facility])    Staphylococcal arthritis of left wrist (Multi)    Infection of left wrist (Multi)    39 year old male with complex medical history, admitted for finding of infective endocarditis due to HD line infection, complicated by septic shock.  Vascular Medicine Service following for anticoagulation recommendations for chronic thrombosis of IVC, SVC, bilateral brachiocephalic, subclavian, internal jugular veins, and chronic changes noted in the left CFV and proximal femoral veins.    Continues with stable INR after completing bridge to Warfarin.      Review of labs shows stable hemoglobin 8.6 grams, continued thrombocytopenia with stable platelets 81K without evidence of bleeding.       Date    INR     Warfarin Dose     Comments   8/8      1.3           5mg                Baseline INR; Start bridge to Warfarin  8/9      1.2           5mg  8/10    1.7           2.5mg  8/11     2.3          2.5mg              First day with therapeutic INR  8/12     3.0          2.5mg              Second day with therapeutic INR; Heparin infusion to stop at 6pm  8/13     2.5          2.5mg              Bridge to Warfarin has been completed.   8/14     2.3          2.5mg  8/15     2.7          2.5mg       Recommendations:  ~CONTINUE Warfarin 2.5mg daily as he continues with stable INR on this dose.   ~MONITOR for bleeding  ~Continue to monitor INR every other day  ~Will need INR monitoring at [facility] 2 times a week, with\"as needed\" Warfarin dose adjustment to maintain INR goal 2-3. When patient discharged to home from SNF, will need outpatient Coumadin Clinic arranged prior to SNF discharge with initial appointment scheduled for 1-2 days after discharge.   ~Outpatient follow up with Dr. Nhung Carrillo from the Vascular Medicine Service has been scheduled as a virtual visit on 10/2/24 at 2:30 pm.      Vascular Medicine Service will sign off; please contact us on pager [phone] for any " questions or concerns.      Plan of care discussed with Dr. Brynn Gregorio  Plan of care discussed in person with Ms. Leilani ROBLERO from the [facility] SDU Team     OSBALDO Richardson  Vascular Medicine Service   Pager [phone]

## 2024-08-15 NOTE — PROGRESS NOTES
"Palliative Medicine following for:  Complex medical decision making, symptom management, patient/family support    History obtained from chart review including ED note, H&P, patient's daily progress notes, review of lab/test results, and discussion with primary team and bedside RN.    Subjective    History of Present Illness  Edwar Hemphill is a 39y gentleman with pmh ESRD on HD, HFrEF (EF 35-40%), T2DM, PAD s/p L AKA and R above elbow amputations, MRSA bacteremia, and multiple readmissions for these chronic illnesses. Admitted to MICU with sepsis due to infective endocarditis. Course has been prolonged and complicated by pressor needs, UGI bleed, septic arthritis. Vascular following for chronic thrombosis.   Palliative team re-engaged on 8/15 for goals of care discussion.     Symptoms  Patient with AMS    Objective    Last Recorded Vitals  BP 81/54 (BP Location: Left arm, Patient Position: Lying)   Pulse 89   Temp 36 °C (96.8 °F)   Resp 16   Ht 1.753 m (5' 9.02\")   Wt 72.1 kg (158 lb 15.2 oz)   SpO2 90%   BMI 23.46 kg/m²      Physical Exam  Constitutional:       Appearance: He is ill-appearing.   HENT:      Head: Normocephalic.      Mouth/Throat:      Mouth: Mucous membranes are dry.   Pulmonary:      Effort: Pulmonary effort is normal.   Abdominal:      General: There is distension.   Neurological:      Mental Status: He is disoriented.          Relevant Results   Results for orders placed or performed during the hospital encounter of 07/22/24 (from the past 24 hour(s))   Electrocardiogram, 12-lead PRN ACS symptoms   Result Value Ref Range    Ventricular Rate 88 BPM    Atrial Rate 88 BPM    CT Interval 160 ms    QRS Duration 74 ms    QT Interval 378 ms    QTC Calculation(Bazett) 457 ms    P Axis 79 degrees    R Axis 34 degrees    T Axis 57 degrees    QRS Count 14 beats    Q Onset 224 ms    P Onset 144 ms    P Offset 197 ms    T Offset 413 ms    QTC Fredericia 429 ms   POCT GLUCOSE   Result Value Ref Range    " POCT Glucose 167 (H) 74 - 99 mg/dL   POCT GLUCOSE   Result Value Ref Range    POCT Glucose 134 (H) 74 - 99 mg/dL   POCT GLUCOSE   Result Value Ref Range    POCT Glucose 119 (H) 74 - 99 mg/dL   Magnesium   Result Value Ref Range    Magnesium 1.91 1.60 - 2.40 mg/dL   Renal function panel   Result Value Ref Range    Glucose 294 (H) 74 - 99 mg/dL    Sodium 135 (L) 136 - 145 mmol/L    Potassium 4.0 3.5 - 5.3 mmol/L    Chloride 98 98 - 107 mmol/L    Bicarbonate 27 21 - 32 mmol/L    Anion Gap 14 10 - 20 mmol/L    Urea Nitrogen 14 6 - 23 mg/dL    Creatinine 3.36 (H) 0.50 - 1.30 mg/dL    eGFR 23 (L) >60 mL/min/1.73m*2    Calcium 8.7 8.6 - 10.6 mg/dL    Phosphorus 3.7 2.5 - 4.9 mg/dL    Albumin 2.5 (L) 3.4 - 5.0 g/dL   CBC   Result Value Ref Range    WBC 5.9 4.4 - 11.3 x10*3/uL    nRBC 0.0 0.0 - 0.0 /100 WBCs    RBC 2.94 (L) 4.50 - 5.90 x10*6/uL    Hemoglobin 8.6 (L) 13.5 - 17.5 g/dL    Hematocrit 27.5 (L) 41.0 - 52.0 %    MCV 94 80 - 100 fL    MCH 29.3 26.0 - 34.0 pg    MCHC 31.3 (L) 32.0 - 36.0 g/dL    RDW 19.1 (H) 11.5 - 14.5 %    Platelets 81 (L) 150 - 450 x10*3/uL   Protime-INR   Result Value Ref Range    Protime 30.9 (H) 9.8 - 12.8 seconds    INR 2.7 (H) 0.9 - 1.1   POCT GLUCOSE   Result Value Ref Range    POCT Glucose 316 (H) 74 - 99 mg/dL        Allergies  Cashew nut and Daptomycin  Medications  Scheduled medications  atorvastatin, 40 mg, oral, Nightly  B complex-vitamin C-folic acid, 1 capsule, oral, Daily  bisacodyl, 10 mg, rectal, Daily  cholecalciferol, 50,000 Units, oral, Once per day on Monday Thursday  epoetin tyson or biosimilar, 10,000 Units, subcutaneous, Once per day on Monday Wednesday Friday  folic acid, 1 mg, oral, Daily  [Held by provider] insulin glargine, 10 Units, subcutaneous, Nightly  insulin lispro, 0-15 Units, subcutaneous, Before meals & nightly  levothyroxine, 150 mcg, oral, Daily before breakfast  melatonin, 6 mg, oral, Nightly  midodrine, 15 mg, oral, TID  nystatin, 1 Application, Topical,  BID  pantoprazole, 40 mg, oral, BID AC  [START ON 9/6/2024] pantoprazole, 40 mg, oral, Daily before breakfast  polyethylene glycol, 17 g, oral, BID  QUEtiapine, 12.5 mg, oral, Nightly  sennosides-docusate sodium, 1 tablet, oral, BID  thiamine, 200 mg, oral, Daily  vancomycin 5 mg/mL + heparin 2500 units/mL in NS lock, 2 mL, intra-catheter, q48h  warfarin, 2.5 mg, oral, Daily      Continuous medications     PRN medications  PRN medications: acetaminophen **OR** [DISCONTINUED] acetaminophen **OR** [DISCONTINUED] acetaminophen, alteplase, dextrose, dextrose, diphenhydrAMINE, glucagon, glucagon, HYDROmorphone, ondansetron, oxyCODONE, vancomycin     Assessment/Plan    Edwar Hemphill is a 39y gentleman with pmh ESRD on HD, HFrEF (EF 35-40%), T2DM, PAD s/p L AKA and R above elbow amputations, MRSA bacteremia, and multiple readmissions for these chronic illnesses. Admitted to MICU with sepsis due to infective endocarditis. Course has been prolonged and complicated by pressor needs, UGI bleed, septic arthritis. Vascular following for chronic thrombosis.   Palliative team re-engaged on 8/15 for goals of care discussion.   ----------------------------------------------------------------------------------------------------------------------------------------------------------------------------------------------  Phone call placed to pt's mother Shanthi who is surrogate decision maker to discuss goals of care.   Discussed pt's overall poor prognosis and high likelihood for further decompensation. Discussed that time may be short due to critical illness, and that there are no further surgical options for endocarditis.    Counseling provided on the importance of not crisis planning as disease burden progresses but to establish treatment limitations now so in the future medical team will be clear on what patient feels is an acceptable quality of life for the patient and what treatment limitations' patient would like set into place based  on that.       Discussed that previous goals of returning to baseline (nursing home, degree of independence) is unlikely to be achievable at this point.     Counseling provided on the benefit versus burden of CPR, intubation/mechanical ventilation, and ICU care in the setting of patient's overall health status.     Shanthi became very tearful during conversation and states she will try to arrange for transportation to come to the hospital today for further discussions. No changes to plan of care were made during this conversation.     I spent 30 minutes in providing separately identifiable ACP services with the patient and/or surrogate decision maker in a voluntary conversation discussing the patient's wishes and goals as detailed in the above note.   ----------------------------------------------------------------------------------------------------------------------------------------------------------------------------------------------    #Complex Medical Decision Making   #Goals of Care  #Advance Care Planning   - Code status: Full code, treatment limitations strongly recommended  - Surrogate decision maker: Shanthi, mother [phone]  - Goals remain treatment focused but require ongoing discussions  - Advanced Directives on file   -Given overall poor prognosis, recommend hospice care at nursing home where he was living previously     #psychosocial support  - Music therapy  - Spiritual care support  - Art therapy      Plan of Care discussed with: Updated MD and bedside RN on goals of care decision, medication adjustments, and code status     Medical Decision Making was high level due to high complexity of problems, extensive data review, and high risk of management/treatment.       Thank you for allowing us to care for this patient. Palliative Team will continue to follow as needed. Please contact team with any questions or concerns.   Team pager [phone] (weekdays)  Cori Woodward DNP, CNP

## 2024-08-15 NOTE — LETTER
August 20, 2024    Edwar Hemphill  [address] [address] [facility] [address]      Dear Mr. Hemphill:    Our multi-disciplinary transplant team completed a review of your medical records on 8/15/2024.  I regret to inform you that the decision was not to proceed with placing you on the [facility] ([facility]) waiting list for a Kidney transplant.    Our transplant program consists of surgeons and medical doctors who provide coverage 365 days a year, 24 hours a day.     If you have any questions or concerns regarding your insurance coverage or billing issues, a  is available to speak with you.     It is important to keep us updated of any major changes in your medical condition, contact information and health insurance coverage.     Please don't hesitate to contact us at Dept: [phone] with any questions or concerns. We look forward to working with you through this process.      Sincerely,      Eva Lea RN          The [facility] Toll-free Patient Services Line:  Your Resource for Organ Transplant Information    If you have a question regarding your own medical care, you always should call your transplant hospital first. However, for general organ transplant-related information, you should call the [facility] ([facility]) toll-free patient services line at [phone].  Anyone, including potential transplant candidates, candidates, recipients, family members, friends, living donors, and donor family members, can call this number to:    Talk about organ donation, living donation, the transplant process, the donation process, and transplant policies.  Get a free patient information kit with helpful booklets, waiting list and transplant information, and a list of all transplant hospitals.  Ask questions about the Organ Procurement and Transplantation Network (OPTN) web site (http://optn.transplant.hrsa.gov/), the [facility] Web site (http://unos.org/), or the [facility] web site for  living donors and transplant recipients. (http://www.transplantliving.org/).  Learn how [facility] and the OPTN can help you.  Talk about any concerns that you may have with a transplant hospital.    [employer] is a not-for-profit organization that provides the administrative services for the national OPTN under federal contract to the Health Resources and Services Administration (HRSA), an agency under the U.S. Department of Health and Human Services (HHS).    [facility] and the OPTN are responsible for:    Providing educational material for patients, the public, and professionals.  Raising awareness of the need for donated organs and tissue.  Writing organ transplant policy with help from transplant professionals, transplant patients, transplant candidates, donor families, living donors, and the public.  Coordinating organ procurement, matching, and placement.  Collecting information about every organ transplant and donation that occurs in the [place].    Remember, you should contact your transplant hospital directly if you have questions or concerns about your own medical care including medical records, work-up progress, and test results.    [facility] is not your transplant hospital, and staff at [facility] will not be able to transfer you to your transplant hospital, so keep your transplant hospital’s phone number handy.    However, while you research your transplant needs and learn as much as you can about transplantation and donation, we welcome your call to our toll-free patient services line at [phone].      [facility] PIL Final Rev 1-

## 2024-08-15 NOTE — PROGRESS NOTES
Spiritual Care Visit    Clinical Encounter Type  Visited With: Family  Routine Visit: Introduction  Continue Visiting: Yes  Crisis Visit: Critical care     Spoke with patient's motherShanthi via cell phone with Palliative CNP about patient's condition and possible medical trajectories. Patient's mother very tearful, expressing that she hasn't been receiving updates about patient's condition and trying to get a ride to the hospital to visit the patient today. Provided non-anxious, supportive presence, reflective listening and reminders for safety and self-care. Will continue to follow.

## 2024-08-15 NOTE — SIGNIFICANT EVENT
RADAR pg - low SpO2     Pt with low perfusion and difficult anatomy to obtain pulse ox. Comfortable and no indicates no signs of SoB. On RA. Team aware of fluctuations in pulse ox    RN and pt encouraged to reach out with any concerns

## 2024-08-15 NOTE — SIGNIFICANT EVENT
Rapid Response RN Note    Rapid response RN at bedside for RADAR score 6 due to the following VS: T 36.2 °C; HR 89; RR 16; BP 81/54; SPO2 90%.     Reviewed above VS with bedside nurse who was present performing pt care.  VS reviewed and confirmed to be within patient's current trends.  Patient awake and alert in no acute distress.  No interventions by rapid response team indicated at this time.      Staff encouraged to page rapid response for any concerns or acute change in condition/VS.

## 2024-08-15 NOTE — SIGNIFICANT EVENT
Discussion was held today with patient's mother Shanthi  related to overall medical problems, poor prognosis, and goals of care.  I reviewed current medical issues including ongoing infection, significant vascular disease with limited access for any additional procedures, recurrent thrombosis and patient's overall medical decline with frequent hospital readmissions.  As well as no additional interventions offered from our consulting teams.  Earlier same discussion was initiated by palliative care team with Shanthi over the phone.  Shanthi arrived to the hospital for further discussion.  I explained that based on the above information  there would not be a reasonable expectation of benefit from CPR and that the burdens/harms significantly outweighs the benefit.  CPR in this patient's situation would be a medically non-benficial treatment and will not be done in the event of a cardiac arrest and well as in the event of patient stopping breathing.  She verbalizes understanding and stated that she would want her son to suffer and would not want him to remain on the ventilator. The code status was changed to DNR/DNI

## 2024-08-15 NOTE — CARE PLAN
The patient's goals for the shift include      The clinical goals for the shift include pt will sstay HDS during the shift    Problem: Pain - Adult  Goal: Verbalizes/displays adequate comfort level or baseline comfort level  Outcome: Progressing     Problem: Pain - Adult  Goal: Verbalizes/displays adequate comfort level or baseline comfort level  Outcome: Progressing     Problem: Safety - Adult  Goal: Free from fall injury  Outcome: Progressing     Problem: Skin  Goal: Decreased wound size/increased tissue granulation at next dressing change  Outcome: Progressing  Flowsheets (Taken 8/15/2024 0644)  Decreased wound size/increased tissue granulation at next dressing change:   Promote sleep for wound healing   Protective dressings over bony prominences  Goal: Participates in plan/prevention/treatment measures  Flowsheets (Taken 8/13/2024 0241)  Participates in plan/prevention/treatment measures: Elevate heels

## 2024-08-15 NOTE — PROGRESS NOTES
Subjective     Interval History: Edwar Hemphill remains with low BP, appropriately responsive    Medications    Current Facility-Administered Medications:     acetaminophen (Tylenol) tablet 650 mg, 650 mg, oral, q4h PRN, 650 mg at 08/02/24 2059 **OR** [DISCONTINUED] acetaminophen (Tylenol) oral liquid 650 mg, 650 mg, nasogastric tube, q4h PRN **OR** [DISCONTINUED] acetaminophen (Tylenol) suppository 650 mg, 650 mg, rectal, q4h PRN, Blaine Gonzalez MD    alteplase (Cathflo Activase) injection 1 mg, 1 mg, intra-catheter, PRN, Leilani Bartram, APRN-CNP, 1 mg at 08/12/24 1548    atorvastatin (Lipitor) tablet 40 mg, 40 mg, oral, Nightly, Alex Caraballo MD, 40 mg at 08/14/24 2130    B complex-vitamin C-folic acid (Nephrocaps) capsule 1 capsule, 1 capsule, oral, Daily, Alex Caraballo MD, 1 capsule at 08/15/24 1001    bisacodyl (Dulcolax) suppository 10 mg, 10 mg, rectal, Daily, Lupe Lopez, APRN-CNP, 10 mg at 08/09/24 1037    cholecalciferol (Vitamin D-3) capsule 50,000 Units, 50,000 Units, oral, Once per day on Monday Thursday, Alex Caraballo MD, 50,000 Units at 08/15/24 1001    dextrose 50 % injection 12.5 g, 12.5 g, intravenous, q15 min PRN, Alex Caraballo MD, 12.5 g at 08/12/24 1446    dextrose 50 % injection 25 g, 25 g, intravenous, q15 min PRN, Alex Caraballo MD, 25 g at 07/31/24 1730    diphenhydrAMINE (BENADryl) capsule 25 mg, 25 mg, oral, q6h PRN, Alex Caraballo MD, 25 mg at 07/24/24 1525    epoetin tyson (Epogen,Procrit) injection 10,000 Units, 10,000 Units, subcutaneous, Once per day on Monday Wednesday Friday, Alex Caraballo MD, 10,000 Units at 08/14/24 0816    folic acid (Folvite) tablet 1 mg, 1 mg, oral, Daily, Alex Caraballo MD, 1 mg at 08/15/24 1001    glucagon (Glucagen) injection 1 mg, 1 mg, intramuscular, q15 min PRN, Alex Caraballo MD    glucagon (Glucagen) injection 1 mg, 1 mg, intramuscular, q15 min PRN, Alex Caraballo MD    HYDROmorphone (Dilaudid) injection 0.1 mg, 0.1 mg, intravenous, q3h PRN, Alex TRUJILLO  MD Ilya, 0.1 mg at 08/08/24 1130    [Held by provider] insulin glargine (Lantus) injection 10 Units, 10 Units, subcutaneous, Nightly, OSBALDO Antonio, 10 Units at 08/11/24 2105    insulin lispro (HumaLOG) injection 0-15 Units, 0-15 Units, subcutaneous, Before meals & nightly, OSBALDO Antonio, 3 Units at 08/15/24 1246    levothyroxine (Synthroid, Levoxyl) tablet 150 mcg, 150 mcg, oral, Daily before breakfast, Alex Caraballo MD, 150 mcg at 08/15/24 0636    melatonin tablet 6 mg, 6 mg, oral, Nightly, Alex Caraballo MD, 6 mg at 08/14/24 2130    midodrine (Proamatine) tablet 15 mg, 15 mg, oral, TID, OSBALDO Potter, 15 mg at 08/15/24 1246    nystatin (Mycostatin) 100,000 unit/gram powder 1 Application, 1 Application, Topical, BID, Alex Caraballo MD, 1 Application at 08/15/24 1007    ondansetron (Zofran) injection 4 mg, 4 mg, intravenous, q6h PRN, Gerardo Foreman MD, 4 mg at 08/07/24 0838    oxyCODONE (Roxicodone) immediate release tablet 5 mg, 5 mg, oral, q4h PRN, Alex Caraballo MD, 5 mg at 08/13/24 2143    pantoprazole (ProtoNix) EC tablet 40 mg, 40 mg, oral, BID AC, OSBALDO Potter, 40 mg at 08/15/24 0636    [START ON 9/6/2024] pantoprazole (ProtoNix) EC tablet 40 mg, 40 mg, oral, Daily before breakfast, OSBALDO Potter    polyethylene glycol (Glycolax, Miralax) packet 17 g, 17 g, oral, BID, OSBALDO Potter, 17 g at 08/15/24 1002    QUEtiapine (SEROquel) tablet 12.5 mg, 12.5 mg, oral, Nightly, Delvin Gomez, APRN-CNP, 12.5 mg at 08/14/24 2131    sennosides-docusate sodium (Aracelis-Colace) 8.6-50 mg per tablet 1 tablet, 1 tablet, oral, BID, Lupe Lopez, APRN-CNP, 1 tablet at 08/15/24 1001    thiamine (Vitamin B-1) tablet 200 mg, 200 mg, oral, Daily, Alex Caraballo MD, 200 mg at 08/15/24 1001    vancomycin (Vancocin) pharmacy to dose - pharmacy monitoring, , miscellaneous, Daily PRN, Alex Caraballo MD    vancomycin 5 mg/mL + heparin 2500 units/mL in NS  lock, 2 mL, intra-catheter, q48h, Paula Llanes, APRN-CNP, 2 mL at 08/14/24 1424    warfarin (Coumadin) tablet 2.5 mg, 2.5 mg, oral, Daily, Delvin Gomez, APRN-CNP, 2.5 mg at 08/14/24 1727    Objective     Physical Exam  Heart S1 S2 RRR, Lungs CTA, + edema      Vital signs in last 24 hours:  Temp:  [36 °C (96.8 °F)-36.7 °C (98.1 °F)] 36 °C (96.8 °F)  Heart Rate:  [80-92] 92  Resp:  [16] 16  BP: ()/(47-68) 84/61       Intake/Output last 3 shifts:  I/O last 3 completed shifts:  In: 2202 (31.1 mL/kg) [I.V.:752 (10.6 mL/kg); Other:1200; IV Piggyback:250]  Out: 0.2 (0 mL/kg) [Other:0.2]  Dosing Weight: 70.9 kg     Labs:  Results from last 7 days   Lab Units 08/15/24  0516   WBC AUTO x10*3/uL 5.9   RBC AUTO x10*6/uL 2.94*   HEMOGLOBIN g/dL 8.6*   HEMATOCRIT % 27.5*     Results from last 7 days   Lab Units 08/15/24  0516 08/10/24  2051 08/10/24  0857   SODIUM mmol/L 135*   < > 132*   POTASSIUM mmol/L 4.0   < > 4.2   CHLORIDE mmol/L 98   < > 94*   CO2 mmol/L 27   < > 18*   BUN mg/dL 14   < > 18   CREATININE mg/dL 3.36*   < > 3.09*   CALCIUM mg/dL 8.7   < > 8.9   PHOSPHORUS mg/dL 3.7   < > 4.4   MAGNESIUM mg/dL 1.91   < >  --    BILIRUBIN TOTAL mg/dL  --   --  1.0   ALT U/L  --   --  8*   AST U/L  --   --  8*    < > = values in this interval not displayed.       Assessment/Plan     Principal Problem:    Septic shock (Multi)  Active Problems:    Acute infective endocarditis (HHS-HCC)    Tricuspid valve vegetation (HHS-HCC)    Staphylococcal arthritis of left wrist (Multi)    Infection of left wrist (Multi)  BP remains low, unable to do SLED on current floor.  Will plan ultrafiltration today to optimize fluid status if tolerated.      Karishma Mcmansu MD  8/15/2024  1:15 PM

## 2024-08-16 LAB
ABO GROUP (TYPE) IN BLOOD: NORMAL
ALBUMIN SERPL BCP-MCNC: 2.6 G/DL (ref 3.4–5)
ANION GAP SERPL CALC-SCNC: 15 MMOL/L (ref 10–20)
ANTIBODY SCREEN: NORMAL
BUN SERPL-MCNC: 17 MG/DL (ref 6–23)
CALCIUM SERPL-MCNC: 9.2 MG/DL (ref 8.6–10.6)
CHLORIDE SERPL-SCNC: 99 MMOL/L (ref 98–107)
CO2 SERPL-SCNC: 26 MMOL/L (ref 21–32)
CREAT SERPL-MCNC: 3.6 MG/DL (ref 0.5–1.3)
EGFRCR SERPLBLD CKD-EPI 2021: 21 ML/MIN/1.73M*2
ERYTHROCYTE [DISTWIDTH] IN BLOOD BY AUTOMATED COUNT: 19.2 % (ref 11.5–14.5)
GLUCOSE BLD MANUAL STRIP-MCNC: 148 MG/DL (ref 74–99)
GLUCOSE BLD MANUAL STRIP-MCNC: 194 MG/DL (ref 74–99)
GLUCOSE BLD MANUAL STRIP-MCNC: 206 MG/DL (ref 74–99)
GLUCOSE BLD MANUAL STRIP-MCNC: 256 MG/DL (ref 74–99)
GLUCOSE SERPL-MCNC: 113 MG/DL (ref 74–99)
HCT VFR BLD AUTO: 29.7 % (ref 41–52)
HGB BLD-MCNC: 9.1 G/DL (ref 13.5–17.5)
INR PPP: 2.8 (ref 0.9–1.1)
MAGNESIUM SERPL-MCNC: 1.93 MG/DL (ref 1.6–2.4)
MCH RBC QN AUTO: 29.3 PG (ref 26–34)
MCHC RBC AUTO-ENTMCNC: 30.6 G/DL (ref 32–36)
MCV RBC AUTO: 96 FL (ref 80–100)
NRBC BLD-RTO: 0 /100 WBCS (ref 0–0)
PHOSPHATE SERPL-MCNC: 3.6 MG/DL (ref 2.5–4.9)
PLATELET # BLD AUTO: 75 X10*3/UL (ref 150–450)
POTASSIUM SERPL-SCNC: 3.8 MMOL/L (ref 3.5–5.3)
PROTHROMBIN TIME: 32.1 SECONDS (ref 9.8–12.8)
RBC # BLD AUTO: 3.11 X10*6/UL (ref 4.5–5.9)
RH FACTOR (ANTIGEN D): NORMAL
SODIUM SERPL-SCNC: 136 MMOL/L (ref 136–145)
VANCOMYCIN SERPL-MCNC: 26.3 UG/ML (ref 5–20)
WBC # BLD AUTO: 6 X10*3/UL (ref 4.4–11.3)

## 2024-08-16 PROCEDURE — 2500000001 HC RX 250 WO HCPCS SELF ADMINISTERED DRUGS (ALT 637 FOR MEDICARE OP)

## 2024-08-16 PROCEDURE — 82947 ASSAY GLUCOSE BLOOD QUANT: CPT

## 2024-08-16 PROCEDURE — 2060000001 HC INTERMEDIATE ICU ROOM DAILY

## 2024-08-16 PROCEDURE — 85610 PROTHROMBIN TIME: CPT

## 2024-08-16 PROCEDURE — 36415 COLL VENOUS BLD VENIPUNCTURE: CPT

## 2024-08-16 PROCEDURE — 6350000001 HC RX 635 EPOETIN >10,000 UNITS: Mod: JZ

## 2024-08-16 PROCEDURE — 2500000001 HC RX 250 WO HCPCS SELF ADMINISTERED DRUGS (ALT 637 FOR MEDICARE OP): Performed by: NURSE PRACTITIONER

## 2024-08-16 PROCEDURE — 99497 ADVNCD CARE PLAN 30 MIN: CPT

## 2024-08-16 PROCEDURE — 2500000002 HC RX 250 W HCPCS SELF ADMINISTERED DRUGS (ALT 637 FOR MEDICARE OP, ALT 636 FOR OP/ED): Performed by: NURSE PRACTITIONER

## 2024-08-16 PROCEDURE — 99498 ADVNCD CARE PLAN ADDL 30 MIN: CPT

## 2024-08-16 PROCEDURE — 2500000002 HC RX 250 W HCPCS SELF ADMINISTERED DRUGS (ALT 637 FOR MEDICARE OP, ALT 636 FOR OP/ED)

## 2024-08-16 PROCEDURE — 86901 BLOOD TYPING SEROLOGIC RH(D): CPT

## 2024-08-16 PROCEDURE — 2500000004 HC RX 250 GENERAL PHARMACY W/ HCPCS (ALT 636 FOR OP/ED): Performed by: NURSE PRACTITIONER

## 2024-08-16 PROCEDURE — 80202 ASSAY OF VANCOMYCIN: CPT

## 2024-08-16 PROCEDURE — 85027 COMPLETE CBC AUTOMATED: CPT | Performed by: NURSE PRACTITIONER

## 2024-08-16 PROCEDURE — 80069 RENAL FUNCTION PANEL: CPT | Performed by: NURSE PRACTITIONER

## 2024-08-16 PROCEDURE — 8010000001 HC DIALYSIS - HEMODIALYSIS PER DAY

## 2024-08-16 PROCEDURE — 83735 ASSAY OF MAGNESIUM: CPT

## 2024-08-16 PROCEDURE — 99233 SBSQ HOSP IP/OBS HIGH 50: CPT | Performed by: INTERNAL MEDICINE

## 2024-08-16 ASSESSMENT — COGNITIVE AND FUNCTIONAL STATUS - GENERAL
PERSONAL GROOMING: A LOT
TOILETING: A LOT
EATING MEALS: A LITTLE
MOVING TO AND FROM BED TO CHAIR: A LOT
DRESSING REGULAR UPPER BODY CLOTHING: A LOT
STANDING UP FROM CHAIR USING ARMS: A LOT
CLIMB 3 TO 5 STEPS WITH RAILING: TOTAL
DAILY ACTIVITIY SCORE: 13
WALKING IN HOSPITAL ROOM: TOTAL
HELP NEEDED FOR BATHING: A LOT
MOVING FROM LYING ON BACK TO SITTING ON SIDE OF FLAT BED WITH BEDRAILS: A LOT
MOBILITY SCORE: 10
DRESSING REGULAR LOWER BODY CLOTHING: A LOT
TURNING FROM BACK TO SIDE WHILE IN FLAT BAD: A LOT

## 2024-08-16 ASSESSMENT — PAIN SCALES - GENERAL
PAINLEVEL_OUTOF10: 0 - NO PAIN

## 2024-08-16 ASSESSMENT — PAIN - FUNCTIONAL ASSESSMENT
PAIN_FUNCTIONAL_ASSESSMENT: 0-10
PAIN_FUNCTIONAL_ASSESSMENT: 0-10

## 2024-08-16 NOTE — CARE PLAN
Problem: Pain - Adult  Goal: Verbalizes/displays adequate comfort level or baseline comfort level  Outcome: Progressing     Problem: Safety - Adult  Goal: Free from fall injury  Outcome: Progressing     Problem: Chronic Conditions and Co-morbidities  Goal: Patient's chronic conditions and co-morbidity symptoms are monitored and maintained or improved  Outcome: Progressing     Problem: Skin  Goal: Decreased wound size/increased tissue granulation at next dressing change  Outcome: Progressing  Flowsheets (Taken 8/16/2024 1242)  Decreased wound size/increased tissue granulation at next dressing change: Protective dressings over bony prominences  Goal: Participates in plan/prevention/treatment measures  Outcome: Progressing  Flowsheets (Taken 8/16/2024 1242)  Participates in plan/prevention/treatment measures: Elevate heels  Goal: Prevent/manage excess moisture  Outcome: Progressing  Flowsheets (Taken 8/16/2024 1242)  Prevent/manage excess moisture: Monitor for/manage infection if present  Goal: Prevent/minimize sheer/friction injuries  Outcome: Progressing  Flowsheets (Taken 8/16/2024 1242)  Prevent/minimize sheer/friction injuries:   HOB 30 degrees or less   Turn/reposition every 2 hours/use positioning/transfer devices  Goal: Promote/optimize nutrition  Outcome: Progressing  Flowsheets (Taken 8/16/2024 1242)  Promote/optimize nutrition: Monitor/record intake including meals  Goal: Promote skin healing  Outcome: Progressing  Flowsheets (Taken 8/16/2024 1242)  Promote skin healing: Turn/reposition every 2 hours/use positioning/transfer devices     Problem: Diabetes  Goal: Maintain electrolyte levels within acceptable range throughout shift  Outcome: Progressing  Goal: Maintain glucose levels >70mg/dl to <250mg/dl throughout shift  Outcome: Progressing  Goal: No changes in neurological exam by end of shift  Outcome: Progressing  Goal: Learn about and adhere to nutrition recommendations by end of shift  Outcome:  Progressing  Goal: Vital signs within normal range for age by end of shift  Outcome: Progressing  Goal: Increase self care and/or family involovement by end of shift  Outcome: Progressing  Goal: Receive DSME education by end of shift  Outcome: Progressing     Problem: Skin  Goal: Decreased wound size/increased tissue granulation at next dressing change  Outcome: Progressing  Flowsheets (Taken 8/16/2024 1242)  Decreased wound size/increased tissue granulation at next dressing change: Protective dressings over bony prominences  Goal: Participates in plan/prevention/treatment measures  Outcome: Progressing  Flowsheets (Taken 8/16/2024 1242)  Participates in plan/prevention/treatment measures: Elevate heels  Goal: Prevent/manage excess moisture  Outcome: Progressing  Flowsheets (Taken 8/16/2024 1242)  Prevent/manage excess moisture: Monitor for/manage infection if present  Goal: Prevent/minimize sheer/friction injuries  Outcome: Progressing  Flowsheets (Taken 8/16/2024 1242)  Prevent/minimize sheer/friction injuries:   HOB 30 degrees or less   Turn/reposition every 2 hours/use positioning/transfer devices  Goal: Promote/optimize nutrition  Outcome: Progressing  Flowsheets (Taken 8/16/2024 1242)  Promote/optimize nutrition: Monitor/record intake including meals  Goal: Promote skin healing  Outcome: Progressing  Flowsheets (Taken 8/16/2024 1242)  Promote skin healing: Turn/reposition every 2 hours/use positioning/transfer devices     Problem: Fall/Injury  Goal: Verbalize understanding of personal risk factors for fall in the hospital  Outcome: Progressing

## 2024-08-16 NOTE — PROGRESS NOTES
Communication Note    Patient Name: Edwar Hemphill  MRN: [id number]  Today's Date: 8/16/2024   Room: [address] [place], [place] [address]    Discipline: Physical Therapy      Missed Visit: Yes  Missed Visit Reason:  (PT intervention reviewed, pending further GOC discussion.  Will monitor and follow up as appropriate.)      08/16/24 at 8:51 AM   David Tom, PT   Rehab Office: [phone]

## 2024-08-16 NOTE — PROGRESS NOTES
Vancomycin Dosing by Pharmacy- FOLLOW UP    Edwar Hemphill is a 39 y.o. year old male who Pharmacy has been consulted for vancomycin dosing for other bacteremia . Based on the patient's indication and renal status this patient is being dosed based on a goal pre-HD level of 20-25.     Renal function is currently HD dependent. Team unsure if pt will get HD 24 as of now.     Most recent random level: 26.3 mcg/mL    Visit Vitals  BP 88/64 (BP Location: Left arm, Patient Position: Lying)   Pulse 94   Temp 35.9 °C (96.6 °F) (Temporal)   Resp 15        Lab Results   Component Value Date    CREATININE 3.60 (H) 2024    CREATININE 3.36 (H) 08/15/2024    CREATININE 3.49 (H) 2024    CREATININE 2.61 (H) 2024        Patient weight is as follows:   Vitals:    24 0600   Weight: 71.1 kg (156 lb 12 oz)       Cultures:  No results found for the encounter in last 14 days.       I/O last 3 completed shifts:  In: 1662 (23.4 mL/kg) [P.O.:60; I.V.:552 (7.8 mL/kg); Other:800; IV Piggyback:250]  Out: 3000 (42.3 mL/kg) [Other:3000]  Dosing Weight: 70.9 kg   I/O during current shift:  No intake/output data recorded.    Temp (24hrs), Av.2 °C (97.1 °F), Min:35.9 °C (96.6 °F), Max:36.9 °C (98.4 °F)      Assessment/Plan    Above goal random/trough level. Will hold of on redosing now and will follow renal plans.   The next level will be obtained as appropriate based on renal plans and HD schedule. May be obtained sooner if clinically indicated.   Will continue to monitor renal function daily while on vancomycin and order serum creatinine at least every 48 hours if not already ordered.  Follow for continued vancomycin needs, clinical response, and signs/symptoms of toxicity.       Giana Fuller, PharmD

## 2024-08-16 NOTE — CARE PLAN
The clinical goals for the shift include Patient will remain HDS    Over the shift, the patient made progress toward the following goals.     Problem: Skin  Goal: Decreased wound size/increased tissue granulation at next dressing change  Outcome: Progressing  Flowsheets (Taken 8/15/2024 0644 by Liana Swift RN)  Decreased wound size/increased tissue granulation at next dressing change:   Promote sleep for wound healing   Protective dressings over bony prominences  Goal: Participates in plan/prevention/treatment measures  Outcome: Progressing  Flowsheets (Taken 8/13/2024 0241 by Liana Swift, RN)  Participates in plan/prevention/treatment measures: Elevate heels  Goal: Prevent/manage excess moisture  Outcome: Progressing  Flowsheets (Taken 8/13/2024 0241 by Liana Swift RN)  Prevent/manage excess moisture: Follow provider orders for dressing changes  Goal: Prevent/minimize sheer/friction injuries  Outcome: Progressing  Flowsheets (Taken 8/12/2024 1520 by Stefanie Robin LPN)  Prevent/minimize sheer/friction injuries:   Complete micro-shifts as needed if patient unable. Adjust patient position to relieve pressure points, not a full turn   HOB 30 degrees or less   Turn/reposition every 2 hours/use positioning/transfer devices   Use pull sheet  Goal: Promote/optimize nutrition  Outcome: Progressing  Flowsheets (Taken 8/13/2024 0241 by Liana Swift RN)  Promote/optimize nutrition: Offer water/supplements/favorite foods  Goal: Promote skin healing  Outcome: Progressing  Flowsheets (Taken 8/12/2024 1520 by Stefanie Robin LPN)  Promote skin healing:   Assess skin/pad under line(s)/device(s)   Protective dressings over bony prominences   Rotate device position/do not position patient on device   Turn/reposition every 2 hours/use positioning/transfer devices     Problem: Fall/Injury  Goal: Not fall by end of shift  Outcome: Met  Goal: Be free from injury by end of the shift  Outcome: Met

## 2024-08-16 NOTE — PROGRESS NOTES
Subjective     Interval History: Edwar Hemphill has had discussions re goals of care.  Had UF yesterday, tolerated    Medications    Current Facility-Administered Medications:     acetaminophen (Tylenol) tablet 650 mg, 650 mg, oral, q4h PRN, 650 mg at 08/02/24 2059 **OR** [DISCONTINUED] acetaminophen (Tylenol) oral liquid 650 mg, 650 mg, nasogastric tube, q4h PRN **OR** [DISCONTINUED] acetaminophen (Tylenol) suppository 650 mg, 650 mg, rectal, q4h PRN, Blaine Gonzalez MD    alteplase (Cathflo Activase) injection 1 mg, 1 mg, intra-catheter, PRN, Leilani Gabriel, APRN-CNP, 1 mg at 08/12/24 1548    atorvastatin (Lipitor) tablet 40 mg, 40 mg, oral, Nightly, Alex Caraballo MD, 40 mg at 08/15/24 2029    B complex-vitamin C-folic acid (Nephrocaps) capsule 1 capsule, 1 capsule, oral, Daily, Alex Caraballo MD, 1 capsule at 08/16/24 0852    bisacodyl (Dulcolax) suppository 10 mg, 10 mg, rectal, Daily, Lupe Lopez, APRN-CNP, 10 mg at 08/09/24 1037    cholecalciferol (Vitamin D-3) capsule 50,000 Units, 50,000 Units, oral, Once per day on Monday Thursday, Alex Caraballo MD, 50,000 Units at 08/15/24 1001    dextrose 50 % injection 12.5 g, 12.5 g, intravenous, q15 min PRN, Alex Caraballo MD, 12.5 g at 08/12/24 1446    dextrose 50 % injection 25 g, 25 g, intravenous, q15 min PRN, Alex Caraballo MD, 25 g at 07/31/24 1730    diphenhydrAMINE (BENADryl) capsule 25 mg, 25 mg, oral, q6h PRN, Alex Caraballo MD, 25 mg at 07/24/24 1525    epoetin tyson (Epogen,Procrit) injection 10,000 Units, 10,000 Units, subcutaneous, Once per day on Monday Wednesday Friday, Alex Caraballo MD, 10,000 Units at 08/16/24 0957    folic acid (Folvite) tablet 1 mg, 1 mg, oral, Daily, Alex Caraballo MD, 1 mg at 08/16/24 0852    glucagon (Glucagen) injection 1 mg, 1 mg, intramuscular, q15 min PRN, Alex Caraballo MD    glucagon (Glucagen) injection 1 mg, 1 mg, intramuscular, q15 min PRN, Alex Caraballo MD    HYDROmorphone (Dilaudid) injection 0.1 mg, 0.1 mg, intravenous,  q3h PRN, Alex Caraballo MD, 0.1 mg at 08/08/24 1130    [Held by provider] insulin glargine (Lantus) injection 10 Units, 10 Units, subcutaneous, Nightly, OSBALDO Antonio, 10 Units at 08/11/24 2105    insulin lispro (HumaLOG) injection 0-15 Units, 0-15 Units, subcutaneous, Before meals & nightly, OSBALDO Antonio, 3 Units at 08/16/24 1229    levothyroxine (Synthroid, Levoxyl) tablet 150 mcg, 150 mcg, oral, Daily before breakfast, Alex Caraballo MD, 150 mcg at 08/16/24 0634    melatonin tablet 6 mg, 6 mg, oral, Nightly, Alex Caraballo MD, 6 mg at 08/15/24 2029    midodrine (Proamatine) tablet 15 mg, 15 mg, oral, TID, OSBALDO Potter, 15 mg at 08/16/24 1225    nystatin (Mycostatin) 100,000 unit/gram powder 1 Application, 1 Application, Topical, BID, Alex Caraballo MD, 1 Application at 08/16/24 0903    ondansetron (Zofran) injection 4 mg, 4 mg, intravenous, q6h PRN, Gerardo Foreman MD, 4 mg at 08/07/24 0838    oxyCODONE (Roxicodone) immediate release tablet 5 mg, 5 mg, oral, q4h PRN, Alex Caraballo MD, 5 mg at 08/13/24 2143    pantoprazole (ProtoNix) EC tablet 40 mg, 40 mg, oral, BID AC, OSBALDO Potter, 40 mg at 08/16/24 0634    [START ON 9/6/2024] pantoprazole (ProtoNix) EC tablet 40 mg, 40 mg, oral, Daily before breakfast, OSBALDO Potter    polyethylene glycol (Glycolax, Miralax) packet 17 g, 17 g, oral, BID, OSBALDO Potter, 17 g at 08/15/24 1002    QUEtiapine (SEROquel) tablet 12.5 mg, 12.5 mg, oral, Nightly, Delvin Gomez, APRN-CNP, 12.5 mg at 08/15/24 2029    sennosides-docusate sodium (Aracelis-Colace) 8.6-50 mg per tablet 1 tablet, 1 tablet, oral, BID, Lupe Lopez, APRN-CNP, 1 tablet at 08/16/24 0852    thiamine (Vitamin B-1) tablet 200 mg, 200 mg, oral, Daily, Alex Caraballo MD, 200 mg at 08/16/24 0852    vancomycin (Vancocin) pharmacy to dose - pharmacy monitoring, , miscellaneous, Daily PRN, Alex Caraballo MD    vancomycin 5 mg/mL + heparin 2500  units/mL in NS lock, 2 mL, intra-catheter, q48h, Paula Llanes, APRN-CNP, 2 mL at 08/14/24 1424    warfarin (Coumadin) tablet 2.5 mg, 2.5 mg, oral, Daily, Delvin Gomez, APRN-CNP, 2.5 mg at 08/15/24 1703    Objective     Physical Exam  Heart S1 S2 RRR, Lungs CTA, no edema      Vital signs in last 24 hours:  Temp:  [35.9 °C (96.6 °F)-36.7 °C (98.1 °F)] 36.2 °C (97.2 °F)  Heart Rate:  [70-96] 70  Resp:  [15-16] 15  BP: ()/(53-80) 78/56       Intake/Output last 3 shifts:  I/O last 3 completed shifts:  In: 1662 (23.4 mL/kg) [P.O.:60; I.V.:552 (7.8 mL/kg); Other:800; IV Piggyback:250]  Out: 3000 (42.3 mL/kg) [Other:3000]  Dosing Weight: 70.9 kg     Labs:  Results from last 7 days   Lab Units 08/16/24  0416   WBC AUTO x10*3/uL 6.0   RBC AUTO x10*6/uL 3.11*   HEMOGLOBIN g/dL 9.1*   HEMATOCRIT % 29.7*     Results from last 7 days   Lab Units 08/16/24  0416 08/10/24  2051 08/10/24  0857   SODIUM mmol/L 136   < > 132*   POTASSIUM mmol/L 3.8   < > 4.2   CHLORIDE mmol/L 99   < > 94*   CO2 mmol/L 26   < > 18*   BUN mg/dL 17   < > 18   CREATININE mg/dL 3.60*   < > 3.09*   CALCIUM mg/dL 9.2   < > 8.9   PHOSPHORUS mg/dL 3.6   < > 4.4   MAGNESIUM mg/dL 1.93   < >  --    BILIRUBIN TOTAL mg/dL  --   --  1.0   ALT U/L  --   --  8*   AST U/L  --   --  8*    < > = values in this interval not displayed.       Assessment/Plan     Principal Problem:    Septic shock (Multi)  Active Problems:    Acute infective endocarditis (HHS-HCC)    Tricuspid valve vegetation (HHS-HCC)    Staphylococcal arthritis of left wrist (Multi)    Infection of left wrist (Multi)    ESRD patient with multiple medical issues as above. Discussions re goals of care underway. Dialysis/ultrafiltration can be tried, if tolerated. if desired by patient and primary team, keeping in mind low BPs.  Renal service with check with primary team and patient on 8/17 if dialysis/UF to be considered.      Karishma Mcmanus MD  8/16/2024  3:55 PM

## 2024-08-16 NOTE — PROGRESS NOTES
Met with pt and his mom Shanthi with Cori Woodward DNP and Rev. Jessica Knight from palliative care.  Explained medical teams' concerns that pt's infections are so severe, that we are not able to cure them, that his blood pressures are running low, and we are worried that time is short. We talked about how hospice care would be most appropriate at this time.  We explained that dialysis would not be recommended under hospice care, and we are seeing signs that because of his low blood pressures, he is starting to not tolerate it well.  Pt was understanding.  Pt and Shanthi willing to speak to hospice knowing that they could care for him back at [facility], which is where pt wants to go.  Referral made to [facility].  Discussed with primary team and with renal team.  Will continue to follow for support.    1630  [facility] to meet with family tomorrow between 1230-1    YEE Fournier

## 2024-08-16 NOTE — PROGRESS NOTES
"Medical Intensive Care Stepdown Unit - Daily Progress Note   Subjective    Edwar Hemphill is a 39 y.o. year old male patient admitted on 7/22/2024 with septic shock    Interval History:    Pt denies any complaints  Patient and mom met with [facility] care today, interested in having discussion with hospice    Meds    Scheduled medications  atorvastatin, 40 mg, oral, Nightly  B complex-vitamin C-folic acid, 1 capsule, oral, Daily  bisacodyl, 10 mg, rectal, Daily  cholecalciferol, 50,000 Units, oral, Once per day on Monday Thursday  epoetin tyson or biosimilar, 10,000 Units, subcutaneous, Once per day on Monday Wednesday Friday  folic acid, 1 mg, oral, Daily  [Held by provider] insulin glargine, 10 Units, subcutaneous, Nightly  insulin lispro, 0-15 Units, subcutaneous, Before meals & nightly  levothyroxine, 150 mcg, oral, Daily before breakfast  melatonin, 6 mg, oral, Nightly  midodrine, 15 mg, oral, TID  nystatin, 1 Application, Topical, BID  pantoprazole, 40 mg, oral, BID AC  [START ON 9/6/2024] pantoprazole, 40 mg, oral, Daily before breakfast  polyethylene glycol, 17 g, oral, BID  QUEtiapine, 12.5 mg, oral, Nightly  sennosides-docusate sodium, 1 tablet, oral, BID  thiamine, 200 mg, oral, Daily  vancomycin 5 mg/mL + heparin 2500 units/mL in NS lock, 2 mL, intra-catheter, q48h  warfarin, 2.5 mg, oral, Daily      Continuous medications     PRN medications  PRN medications: acetaminophen **OR** [DISCONTINUED] acetaminophen **OR** [DISCONTINUED] acetaminophen, alteplase, dextrose, dextrose, diphenhydrAMINE, glucagon, glucagon, HYDROmorphone, ondansetron, oxyCODONE, vancomycin     Objective    Blood pressure 90/67, pulse 89, temperature 36.1 °C (97 °F), resp. rate 16, height 1.753 m (5' 9.02\"), weight 71.1 kg (156 lb 12 oz), SpO2 94%.     Constitutional: pt in NAD, alert and cooperative  Eyes: PERRL, EOMI, no icterus   ENMT: mucous membranes moist, no apparent injury, no lesions seen  Head/Neck: Neck supple, no apparent " injury  Respiratory/Thorax: Lungs CTA bilaterally, non-labored breathing, no cough, on RA  Cardiovascular: Regular, rate and rhythm, no murmurs, normal S1 and S2  Gastrointestinal: Nondistended, soft, non-tender, BS present x 4  Musculoskeletal: RUE amputation (healed), L AKA (healed).  L wrist surgical site, wrapped in kerlix  Extremities: no edema, contusions or wounds  Neurological: alert and oriented x 3, speech clear, follows commands appropriately, cr. n. II-XII intact, sensation grossly intact, motor 5/5 throughout  Skin: Warm and dry, R fem HD line with CHG dressing;       Intake/Output Summary (Last 24 hours) at 8/16/2024 0722  Last data filed at 8/15/2024 2244  Gross per 24 hour   Intake 1260 ml   Output 3000 ml   Net -1740 ml     Labs:   Results from last 72 hours   Lab Units 08/16/24  0416 08/15/24  0516 08/14/24  0448   SODIUM mmol/L 136 135* 132*   POTASSIUM mmol/L 3.8 4.0 3.9   CHLORIDE mmol/L 99 98 95*   CO2 mmol/L 26 27 28   BUN mg/dL 17 14 16   CREATININE mg/dL 3.60* 3.36* 3.49*   GLUCOSE mg/dL 113* 294* 168*   CALCIUM mg/dL 9.2 8.7 8.9   ANION GAP mmol/L 15 14 13   EGFR mL/min/1.73m*2 21* 23* 22*   PHOSPHORUS mg/dL 3.6 3.7 4.2      Results from last 72 hours   Lab Units 08/16/24  0416 08/15/24  0516 08/14/24  0448   WBC AUTO x10*3/uL 6.0 5.9 6.1   HEMOGLOBIN g/dL 9.1* 8.6* 8.6*   HEMATOCRIT % 29.7* 27.5* 27.2*   PLATELETS AUTO x10*3/uL 75* 81* 78*        Micro/ID:     Lab Results   Component Value Date    BLOODCULT No growth at 4 days -  FINAL REPORT 07/31/2024    BLOODCULT No growth at 4 days -  FINAL REPORT 07/31/2024       Summary of key imaging results from the last 24 hours  None    Assessment and Plan     Assessment: Edwar Hemphill is a 39 y.o. year old male patient with hx of ESRD on HD (MWF, right femoral line), HFrEF (EF 35-40% 5/2024), T2DM, PAD s/p L AKA and R above elbow amputation with multiple hospitalizations for L AKA infection and MRSA bacteremia  admitted on 7/22/2024 with septic  shock admitted      Restraints: no     Summary for 08/15/24  :  Ongoing GOC with Pall Care, plan to meet with hospice       Plan:  NEUROLOGY/PSYCH:  Dx: Acute left wrist pain.  Delirium, confusion (resolving)  Management:  Pain reg: tylenol PRN, oxycodone prn, and Dilaudid prn   Melatonin HS,  Seroquel HS   PT/OT/OOB  Delirium Protocol      CARDIOVASCULAR:  Dx: Infective endocarditis with TV vegetation, Severely attenuated superior vena cava, bilateral brachiocephalic, subclavian and internal jugular veins with the calcifications in the brachiocephalic veins with extensive collaterals in the chest wall likely representing chronic thrombosis.  Septic shock, off pressors since 8/4.  Persistent hypotension (asymptomatic).  Hx of HLD  SBP >80  Management:  Home Meds: Atorvastatin 40 mg daily, Midodrine 10 mg QID  Patient had vegectomy aborted due to to concerns for infected thrombus in the IVC.   Continue Atorvastatin 40 mg daily  Midodrine 15mg TID (dose was increased on 8/15)       PULMONARY:  Dx: No acute issues   Management:  Stable on RA     RENAL/GENITOURINARY:  Dx: Hx ESRD on iHD MWF   Management:  Last iHD on 8/15  IR evaluated on 8/14- Risk of losing access and infection risk are too high. As line is currently working, no indication to exchange as it is functioning appropriately and it is his only functioning site of access.     GASTROENTEROLOGY:  Dx: Hx of GERD, Esophagitis, Recent GIB (6/2024) s/p clipping x2. Constipation, resolved.    Poor PO intake  Management:  Diet: Regular and Nepro supplements  Bowel Regimen: Dulcolax suppository daily, Aracelis-colace BID, and Miralax BID   GI recommending BID PPI x1 month then switch to daily indefinitely. Will need to follow up with Dr. No Kimball as O/p      ENDOCRINOLOGY:  Dx: DM, Hypothyroid.  Euvolemic DKA (resolved) 2/2 poor PO intake  Management:  A1C: 6.8 3(3/2024)  Blood sugars    8/12: TSH 7.48, Free T4 1.01   Holding Glargine 10 U HS  2/2  hypoglycemia  Continue  SSI, accu checks and hypoglycemia protocol   Continue Levothyroxine 150mg daily.     HEMATOLOGY:  Dx: Anema of chronic disease, Thrombocytopenia, Chronic thrombosis of IVC, SVC, blanca brachiocephalic, Subclavian, internal jugular and chronic changes in L CFV & Prox fem veins  MRI head with no evidence of bleeding or mycotic aneurysm   Required 2 U pRBC on 7/24 and 7/29.   No surgical options available as per Vascular Surgery recs from  8/14  Vascular Medicine following, for management of AC  Started on heparin drip 7/31 and now transitioned to Coumadin, Heparin gtt stopped 8/12  Management:  Continue Coumadin 2.5 mg  Monitor for bleeding given recent GIB  Daily INR monitor, goal INR 2-3     MUSCULOSKELETAL/ SKIN:  Dx: L wrist septic arthritis, s/p I&D 7/29 with ortho  Ortho hand contacted 8/13.  Reevaluated 8/14.  Unsuccessful attempt to aspirate fluid from joint.  As no purulent drainage noted thinking likely hematoma.         Management   Aggressively elevate LUE  Keep ortho hand team notified of anticipated discharge date      INFECTIOUS DISEASE:  Dx: MRSA bacteremia, Infective Endocarditis (TV),  L wrist septic arthritis s/p I&D (7/29), septic shock (resolved).  Hx of Acinetobacter  Management:  Tmax: 36.1 C, WBC 6.0  MICRO: 7/22 and 7/25 BC with MRSA, 7/27 BC Staph Haemolyticus; 7/27 synovial fluid from Left wrist MRSA, BC 7/29 and 7/31 Neg, 7/29 Fungal cx (tissue) pending, Bacterial cx neg  Antimicrobials: Continue vancomycin post-HD through end date 9/13   Will instill vanc into HD cath after each HD session  Will trial indefinite suppression with Bactrim 1SS daily after vancomycin course (ID follow up on 9/10)   Follow up with ID, Dr Doroteo Graham 9/10      ICU Check List         FEN  Fluids: PRN  Electrolytes: PRN  Nutrition: diabetic Diet  Prophylaxis:  DVT ppx: Coumadin  GI ppx: PPI BID  Bowel care: Dulcolax suppository daily, Miralax BID, and Aracelis-Colace BID  Hardware:  Catheter:  none  Access: Right femoral tunneled HD line  Lines: Left EJ   Social:  Code:DNR/DNRI  HPOA: Shanthi Hemphill, Parent, [phone]   Disposition: Continue in SDU care for tx  Discharge: Plan to return to [facility].  Hospice consulted       Wendy Pérez, AMARILYS-CNP   08/16/24 at 7:22 AM     Pt discussed with Dr. Medina seen and examined. All labs, VS and previous plan of care reviewed.  I spent 45 minutes in the professional and overall care of this patient.      Disclaimer: Documentation completed with the information available at the time of input. The times in the chart may not be reflective of actual patient care times, interventions, or procedures. Documentation occurs after the physical care of the patient.

## 2024-08-16 NOTE — PROGRESS NOTES
Spiritual Care Visit    Clinical Encounter Type  Visited With: Patient and family together  Continue Visiting: Yes  Crisis Visit: Critical care  Referral From: Other (Comment) (CNP)  Referral To:      Met with patient and patient's mother, Shanthi along with Palliative CNP and SW to discuss patient's condition and medical pathways forward. Provided non-anxious, supportive presence and affirmation of feelings. Will continue to follow.

## 2024-08-16 NOTE — ACP (ADVANCE CARE PLANNING)
Advance Care Planning Note     Goals of care meeting with pt, mother, and palliative team  Patient's current clinical condition, including diagnosis, prognosis, and management plan were discussed. Discussed that pt is not surgical candidate for endocarditis and there are little to no curative options moving forward. Discussed hypotension, limited access options for dialysis lines, and high risk for worsening infection and decompensation.  Reviewed pt's previous goal of returning to nursing home. Discussed that hospice care would be able to support pt at nursing home, where he can receive end of life care, in the setting of poor prognosis.   Counseling provided on the benefit of Hospice Services in the setting of patient's  ESRD and endocarditis to keep patient out of the hospital while supporting patient and family by providing counseling, aggressive symptom management,  prioritizing comfort and quality of life, alleviation of suffering, and allowing patient to pass with comfort and dignity. Discussed that stopping HD would be in alignment with the comfort/hospice philosophy.   Patient and mother Shanthi expressed clear understanding of the above information and are agreeable to moving forward with hospice care at nursing home.     Per Shanthi's request, phone calls were placed to pt's sister (Mary [phone]) and cousin (Keon [phone]) to discuss the above information. Prognosis was discussed, likely days to weeks. It is encouraged that family and loved ones make arrangements to visit patient, given that time is short, and when pt stops HD he is likely to become confused and/or drowsy. They expressed clear understanding of this information and support pt moving forward with hospice care.     Plan:   -Social work placing hospice referral. Hospice will meet with pt/family. If/when consents are signed, transportation will be arranged for discharge back to nursing home  -Can continue treatment-focused care  while inpatient to optimize patient for discharge. Can also transition to comfort care while inpatient, at pt/family's request. This was not discussed in great detail today, due to the intensity of the above conversation.   -Continue DNR/DNI  -HCPOA: mother Shanthi Hemphill [phone]    Plan discussed with Melanie EVERETT and Dr. Marietta Woodward DNP, CNP  Palliative Medicine     I spent 90 minutes in providing separately identifiable ACP services with the patient and/or surrogate decision maker in a voluntary conversation discussing the patient's wishes and goals as detailed in the above note.

## 2024-08-17 LAB
ALBUMIN SERPL BCP-MCNC: 2.4 G/DL (ref 3.4–5)
ANION GAP SERPL CALC-SCNC: 14 MMOL/L (ref 10–20)
ATRIAL RATE: 88 BPM
BUN SERPL-MCNC: 20 MG/DL (ref 6–23)
CALCIUM SERPL-MCNC: 8.8 MG/DL (ref 8.6–10.6)
CHLORIDE SERPL-SCNC: 101 MMOL/L (ref 98–107)
CO2 SERPL-SCNC: 27 MMOL/L (ref 21–32)
CREAT SERPL-MCNC: 4.13 MG/DL (ref 0.5–1.3)
EGFRCR SERPLBLD CKD-EPI 2021: 18 ML/MIN/1.73M*2
ERYTHROCYTE [DISTWIDTH] IN BLOOD BY AUTOMATED COUNT: 19.2 % (ref 11.5–14.5)
GLUCOSE BLD MANUAL STRIP-MCNC: 150 MG/DL (ref 74–99)
GLUCOSE BLD MANUAL STRIP-MCNC: 180 MG/DL (ref 74–99)
GLUCOSE BLD MANUAL STRIP-MCNC: 63 MG/DL (ref 74–99)
GLUCOSE BLD MANUAL STRIP-MCNC: 72 MG/DL (ref 74–99)
GLUCOSE SERPL-MCNC: 66 MG/DL (ref 74–99)
HCT VFR BLD AUTO: 27.1 % (ref 41–52)
HGB BLD-MCNC: 8.3 G/DL (ref 13.5–17.5)
INR PPP: 4.2 (ref 0.9–1.1)
MAGNESIUM SERPL-MCNC: 2 MG/DL (ref 1.6–2.4)
MCH RBC QN AUTO: 29.1 PG (ref 26–34)
MCHC RBC AUTO-ENTMCNC: 30.6 G/DL (ref 32–36)
MCV RBC AUTO: 95 FL (ref 80–100)
NRBC BLD-RTO: 0 /100 WBCS (ref 0–0)
P AXIS: 79 DEGREES
P OFFSET: 197 MS
P ONSET: 144 MS
PHOSPHATE SERPL-MCNC: 3.6 MG/DL (ref 2.5–4.9)
PLATELET # BLD AUTO: 94 X10*3/UL (ref 150–450)
POTASSIUM SERPL-SCNC: 3.6 MMOL/L (ref 3.5–5.3)
PR INTERVAL: 160 MS
PROTHROMBIN TIME: 48.5 SECONDS (ref 9.8–12.8)
Q ONSET: 224 MS
QRS COUNT: 14 BEATS
QRS DURATION: 74 MS
QT INTERVAL: 378 MS
QTC CALCULATION(BAZETT): 457 MS
QTC FREDERICIA: 429 MS
R AXIS: 34 DEGREES
RBC # BLD AUTO: 2.85 X10*6/UL (ref 4.5–5.9)
SODIUM SERPL-SCNC: 138 MMOL/L (ref 136–145)
T AXIS: 57 DEGREES
T OFFSET: 413 MS
VENTRICULAR RATE: 88 BPM
WBC # BLD AUTO: 5.3 X10*3/UL (ref 4.4–11.3)

## 2024-08-17 PROCEDURE — 2500000001 HC RX 250 WO HCPCS SELF ADMINISTERED DRUGS (ALT 637 FOR MEDICARE OP)

## 2024-08-17 PROCEDURE — 85610 PROTHROMBIN TIME: CPT

## 2024-08-17 PROCEDURE — 36415 COLL VENOUS BLD VENIPUNCTURE: CPT

## 2024-08-17 PROCEDURE — 82947 ASSAY GLUCOSE BLOOD QUANT: CPT

## 2024-08-17 PROCEDURE — 83735 ASSAY OF MAGNESIUM: CPT

## 2024-08-17 PROCEDURE — 2500000002 HC RX 250 W HCPCS SELF ADMINISTERED DRUGS (ALT 637 FOR MEDICARE OP, ALT 636 FOR OP/ED): Performed by: NURSE PRACTITIONER

## 2024-08-17 PROCEDURE — 80069 RENAL FUNCTION PANEL: CPT | Performed by: NURSE PRACTITIONER

## 2024-08-17 PROCEDURE — 2500000002 HC RX 250 W HCPCS SELF ADMINISTERED DRUGS (ALT 637 FOR MEDICARE OP, ALT 636 FOR OP/ED)

## 2024-08-17 PROCEDURE — 2060000001 HC INTERMEDIATE ICU ROOM DAILY

## 2024-08-17 PROCEDURE — 85027 COMPLETE CBC AUTOMATED: CPT | Performed by: NURSE PRACTITIONER

## 2024-08-17 PROCEDURE — 2500000001 HC RX 250 WO HCPCS SELF ADMINISTERED DRUGS (ALT 637 FOR MEDICARE OP): Performed by: NURSE PRACTITIONER

## 2024-08-17 RX ORDER — MIDODRINE HYDROCHLORIDE 10 MG/1
20 TABLET ORAL
Status: DISCONTINUED | OUTPATIENT
Start: 2024-08-18 | End: 2024-08-22 | Stop reason: HOSPADM

## 2024-08-17 ASSESSMENT — COGNITIVE AND FUNCTIONAL STATUS - GENERAL
TOILETING: A LOT
PERSONAL GROOMING: A LOT
DAILY ACTIVITIY SCORE: 13
HELP NEEDED FOR BATHING: A LOT
STANDING UP FROM CHAIR USING ARMS: A LOT
MOVING TO AND FROM BED TO CHAIR: A LOT
MOBILITY SCORE: 10
DRESSING REGULAR UPPER BODY CLOTHING: A LOT
TURNING FROM BACK TO SIDE WHILE IN FLAT BAD: A LOT
CLIMB 3 TO 5 STEPS WITH RAILING: TOTAL
EATING MEALS: A LITTLE
DRESSING REGULAR LOWER BODY CLOTHING: A LOT
MOVING FROM LYING ON BACK TO SITTING ON SIDE OF FLAT BED WITH BEDRAILS: A LOT
WALKING IN HOSPITAL ROOM: TOTAL

## 2024-08-17 ASSESSMENT — PAIN - FUNCTIONAL ASSESSMENT
PAIN_FUNCTIONAL_ASSESSMENT: 0-10

## 2024-08-17 ASSESSMENT — PAIN SCALES - GENERAL
PAINLEVEL_OUTOF10: 0 - NO PAIN
PAINLEVEL_OUTOF10: 2
PAINLEVEL_OUTOF10: 6

## 2024-08-17 ASSESSMENT — PAIN DESCRIPTION - LOCATION: LOCATION: BACK

## 2024-08-17 ASSESSMENT — PAIN DESCRIPTION - ORIENTATION: ORIENTATION: LOWER

## 2024-08-17 NOTE — PROGRESS NOTES
08/17/24 1000   Discharge Planning   Living Arrangements Other (Comment)  (LTC at [facility])   Support Systems Parent;Family members   Type of Residence Nursing home/residential care   Home or Post Acute Services Post acute facilities (Rehab/SNF/etc)   Type of Post Acute Facility Services Long term care  ([facility])   Expected Discharge Disposition Hospice Res  (return to [facility], possibly under [facility] care)     [facility] will meet with pt and family bedside this afternoon 8/17 [id number].  SW will follow. Gerardo PEREZ, LSW.

## 2024-08-17 NOTE — CARE PLAN
ESRD patient on Regular HD MWF.  Last HD session 8/16 with 1.8 L UF  No indication for HD today. +1 LL Edema but no Pul edema or Respiratory compromise, No Hyperkalemia acidemia or Uremia.  Patient prefers to not be dialized  To evaluate tomorrow for indications for HD

## 2024-08-17 NOTE — NURSING NOTE
[facility]: I met with pt and his mother Shanthi Hemphill. Hospice services at home vs NF vs inpatient unit reviewed. Pt currently denies pain or other symptoms. Pt states he is not ready to stop dialysis and feels like hospice is murder. Support provided. [facility] lit and referral office number given. Dr Ferrell, Melanie Pérez NP. Milena THORNTON and Gerardo FRANCOIS notified of outcome of meeting. Thank you for the referral. [phone]

## 2024-08-17 NOTE — PROGRESS NOTES
"Medical Intensive Care Stepdown Unit - Daily Progress Note   Subjective    Edwar Hemphill is a 39 y.o. year old male patient admitted on 7/22/2024 with septic shock    Interval History:  U/F session yesterday with 900 ml fluid removal  Remains hypotensive with SBP 80's      Meds    Scheduled medications  atorvastatin, 40 mg, oral, Nightly  B complex-vitamin C-folic acid, 1 capsule, oral, Daily  bisacodyl, 10 mg, rectal, Daily  cholecalciferol, 50,000 Units, oral, Once per day on Monday Thursday  epoetin tyson or biosimilar, 10,000 Units, subcutaneous, Once per day on Monday Wednesday Friday  folic acid, 1 mg, oral, Daily  [Held by provider] insulin glargine, 10 Units, subcutaneous, Nightly  insulin lispro, 0-15 Units, subcutaneous, Before meals & nightly  levothyroxine, 150 mcg, oral, Daily before breakfast  melatonin, 6 mg, oral, Nightly  midodrine, 15 mg, oral, TID  nystatin, 1 Application, Topical, BID  pantoprazole, 40 mg, oral, BID AC  [START ON 9/6/2024] pantoprazole, 40 mg, oral, Daily before breakfast  polyethylene glycol, 17 g, oral, BID  QUEtiapine, 12.5 mg, oral, Nightly  sennosides-docusate sodium, 1 tablet, oral, BID  thiamine, 200 mg, oral, Daily  vancomycin 5 mg/mL + heparin 2500 units/mL in NS lock, 2 mL, intra-catheter, q48h  warfarin, 2.5 mg, oral, Daily      Continuous medications     PRN medications  PRN medications: acetaminophen **OR** [DISCONTINUED] acetaminophen **OR** [DISCONTINUED] acetaminophen, alteplase, dextrose, dextrose, diphenhydrAMINE, glucagon, glucagon, HYDROmorphone, ondansetron, oxyCODONE, vancomycin     Objective    Blood pressure (!) 74/48, pulse 78, temperature 36.1 °C (97 °F), resp. rate 17, height 1.753 m (5' 9.02\"), weight 71.1 kg (156 lb 12 oz), SpO2 94%.     Constitutional: pt in NAD, alert and cooperative  Eyes: PERRL, EOMI, no icterus   ENMT: mucous membranes moist, no apparent injury, no lesions seen  Head/Neck: Neck supple, no apparent injury  Respiratory/Thorax: Lungs " CTA bilaterally, non-labored breathing, no cough, on RA  Cardiovascular: Regular, rate and rhythm, no murmurs, normal S1 and S2  Gastrointestinal: Nondistended, soft, non-tender, BS present x 4  Musculoskeletal: RUE amputation (healed), L AKA (healed).  L wrist surgical site, wrapped in kerlix  Extremities: no edema, contusions or wounds  Neurological: alert and oriented x 3, speech clear, follows commands appropriately, cr. n. II-XII intact, sensation grossly intact, motor 5/5 throughout  Skin: Warm and dry, R fem HD line with CHG dressing;       Intake/Output Summary (Last 24 hours) at 8/17/2024 0617  Last data filed at 8/17/2024 0500  Gross per 24 hour   Intake 1400 ml   Output 1800 ml   Net -400 ml     Labs:   Results from last 72 hours   Lab Units 08/16/24  0416 08/15/24  0516   SODIUM mmol/L 136 135*   POTASSIUM mmol/L 3.8 4.0   CHLORIDE mmol/L 99 98   CO2 mmol/L 26 27   BUN mg/dL 17 14   CREATININE mg/dL 3.60* 3.36*   GLUCOSE mg/dL 113* 294*   CALCIUM mg/dL 9.2 8.7   ANION GAP mmol/L 15 14   EGFR mL/min/1.73m*2 21* 23*   PHOSPHORUS mg/dL 3.6 3.7      Results from last 72 hours   Lab Units 08/16/24  0416 08/15/24  0516   WBC AUTO x10*3/uL 6.0 5.9   HEMOGLOBIN g/dL 9.1* 8.6*   HEMATOCRIT % 29.7* 27.5*   PLATELETS AUTO x10*3/uL 75* 81*        Micro/ID:     Lab Results   Component Value Date    BLOODCULT No growth at 4 days -  FINAL REPORT 07/31/2024    BLOODCULT No growth at 4 days -  FINAL REPORT 07/31/2024       Summary of key imaging results from the last 24 hours  None    Assessment and Plan     Assessment: Edwar Hemphill is a 39 y.o. year old male patient with hx of ESRD on HD (MWF, right femoral line), HFrEF (EF 35-40% 5/2024), T2DM, PAD s/p L AKA and R above elbow amputation with multiple hospitalizations for L AKA infection and MRSA bacteremia  admitted on 7/22/2024 with septic shock admitted      Restraints: no     Summary for 08/17/24  :  Hospice meeting today       Plan:  NEUROLOGY/PSYCH:  Dx: Acute  left wrist pain.  Delirium, confusion (resolving)  Management:  Pain reg: tylenol PRN, oxycodone prn, and Dilaudid prn   Melatonin HS,  Seroquel HS   PT/OT/OOB  Delirium Protocol      CARDIOVASCULAR:  Dx: Infective endocarditis with TV vegetation, Severely attenuated superior vena cava, bilateral brachiocephalic, subclavian and internal jugular veins with the calcifications in the brachiocephalic veins with extensive collaterals in the chest wall likely representing chronic thrombosis.  Septic shock, off pressors since 8/4.  Persistent hypotension (asymptomatic).  Hx of HLD  SBP 80's  Management:  Home Meds: Atorvastatin 40 mg daily, Midodrine 10 mg QID  Patient had vegectomy aborted due to to concerns for infected thrombus in the IVC.   Continue Atorvastatin 40 mg daily  Midodrine 15mg TID (dose was increased on 8/15)       PULMONARY:  Dx: No acute issues   Management:  Stable on RA     RENAL/GENITOURINARY:  Dx: Hx ESRD on iHD MWF   Management:  Last iHD on 8/15, U/F 8/15 and 8/16  IR evaluated on 8/14- Risk of losing access and infection risk are too high. As line is currently working, no indication to exchange as it is functioning appropriately and it is his only functioning site of access.     GASTROENTEROLOGY:  Dx: Hx of GERD, Esophagitis, Recent GIB (6/2024) s/p clipping x2. Constipation, resolved.    Poor PO intake  Management:  Diet: Regular and Nepro supplements  Bowel Regimen: Dulcolax suppository daily, Aracelis-colace BID, and Miralax BID   GI recommending BID PPI x1 month then switch to daily indefinitely. Will need to follow up with Dr. No Kimball as O/p      ENDOCRINOLOGY:  Dx: DM, Hypothyroid.  Euvolemic DKA (resolved) 2/2 poor PO intake.  Hypoglycemia episodes 2/2 poor PO intake  Management:  A1C: 6.8 3(3/2024)  Blood sugars    8/12: TSH 7.48, Free T4 1.01   Continue Holding Glargine 10 U HS  2/2 hypoglycemia  Continue  SSI, accu checks and hypoglycemia protocol   Continue Levothyroxine 150mg  daily.     HEMATOLOGY:  Dx: Anema of chronic disease, Thrombocytopenia, Chronic thrombosis of IVC, SVC, blanca brachiocephalic, Subclavian, internal jugular and chronic changes in L CFV & Prox fem veins  MRI head with no evidence of bleeding or mycotic aneurysm   Required 2 U pRBC on 7/24 and 7/29.   No surgical options available as per Vascular Surgery recs from  8/14  Vascular Medicine following, for management of AC  Started on heparin drip 7/31 and now transitioned to Coumadin, Heparin gtt stopped 8/12  Management:  Continue Coumadin 2.5 mg==> hold tonight (INR 4.2)  Monitor for bleeding given recent GIB  Daily INR monitor, goal INR 2-3     MUSCULOSKELETAL/ SKIN:  Dx: L wrist septic arthritis, s/p I&D 7/29 with ortho  Ortho hand contacted 8/13.  Reevaluated 8/14.  Unsuccessful attempt to aspirate fluid from joint.  As no purulent drainage noted thinking likely hematoma.         Management   Aggressively elevate LUE  Keep ortho hand team notified of anticipated discharge date      INFECTIOUS DISEASE:  Dx: MRSA bacteremia, Infective Endocarditis (TV),  L wrist septic arthritis s/p I&D (7/29), septic shock (resolved).  Hx of Acinetobacter  Management:  Tmax: 36.4 C, WBC 5.3  MICRO: 7/22 and 7/25 BC with MRSA, 7/27 BC Staph Haemolyticus; 7/27 synovial fluid from Left wrist MRSA, BC 7/29 and 7/31 Neg, 7/29 Fungal cx (tissue) pending, Bacterial cx neg  Antimicrobials: Continue vancomycin post-HD through end date 9/13   Will instill vanc into HD cath after each HD session  Will trial indefinite suppression with Bactrim 1SS daily after vancomycin course (ID follow up on 9/10)   Follow up with ID, Dr Doroteo Graham 9/10      ICU Check List         FEN  Fluids: PRN  Electrolytes: PRN  Nutrition: diabetic Diet + supplements  Prophylaxis:  DVT ppx: Coumadin  GI ppx: PPI BID  Bowel care: Dulcolax suppository daily, Miralax BID, and Aracelis-Colace BID  Hardware:  Catheter: none  Access: Right femoral tunneled HD line  Lines: Left EJ    Social:  Code:DNR/DNRI  HPOA: Uzma Reynaga, [phone]   Disposition: Continue in SDU care for tx  Discharge: Plan to return to [facility].  Hospice consulted       AMARILYS Sheikh-CNP   08/17/24 at 6:17 AM     Pt discussed with Dr. Ferrell, seen and examined. All labs, VS and previous plan of care reviewed.  I spent 45 minutes in the professional and overall care of this patient.      Disclaimer: Documentation completed with the information available at the time of input. The times in the chart may not be reflective of actual patient care times, interventions, or procedures. Documentation occurs after the physical care of the patient.

## 2024-08-17 NOTE — PROGRESS NOTES
"Met with patient and mother after hospice meeting.  Patient was feeling that enrolling in hospice would be \"murder\".  We discussed ongoing medical concerns including access issues, persistent hypotension, acute on chronic infections including bacteremia's, infective endocarditis and chronic IVC thrombosis.  We talked about how having hospice on board will assist with symptom mgmt when he transitions to EOL.  He was concerned that medications would cause his death.  Edwar expressed concerns about being stuck in the hospital but also not ready to stop hemodialysis.  I talked with him and mom about utilizing the [facility] Navigator program, they are tentatively interested in that.   We talked about need to continue discussions if/when HD is no longer an option.      Plan:  - continue DNR/DNRI  - ongoing GOC  - per [facility] RN, call [facility] referral office at [phone] on the day of DC to let us know he's being DC so we can then obtain Navigator order for GOC from the covering md/np at .   "

## 2024-08-18 VITALS
RESPIRATION RATE: 16 BRPM | TEMPERATURE: 96.8 F | BODY MASS INDEX: 28.28 KG/M2 | WEIGHT: 190.92 LBS | HEART RATE: 94 BPM | SYSTOLIC BLOOD PRESSURE: 98 MMHG | HEIGHT: 69 IN | DIASTOLIC BLOOD PRESSURE: 74 MMHG | OXYGEN SATURATION: 98 %

## 2024-08-18 LAB
ALBUMIN SERPL BCP-MCNC: 2.4 G/DL (ref 3.4–5)
ANION GAP SERPL CALC-SCNC: 17 MMOL/L (ref 10–20)
BUN SERPL-MCNC: 25 MG/DL (ref 6–23)
CALCIUM SERPL-MCNC: 8.6 MG/DL (ref 8.6–10.6)
CHLORIDE SERPL-SCNC: 101 MMOL/L (ref 98–107)
CO2 SERPL-SCNC: 26 MMOL/L (ref 21–32)
CREAT SERPL-MCNC: 5.03 MG/DL (ref 0.5–1.3)
EGFRCR SERPLBLD CKD-EPI 2021: 14 ML/MIN/1.73M*2
ERYTHROCYTE [DISTWIDTH] IN BLOOD BY AUTOMATED COUNT: 18.9 % (ref 11.5–14.5)
GLUCOSE BLD MANUAL STRIP-MCNC: 186 MG/DL (ref 74–99)
GLUCOSE BLD MANUAL STRIP-MCNC: 258 MG/DL (ref 74–99)
GLUCOSE BLD MANUAL STRIP-MCNC: 49 MG/DL (ref 74–99)
GLUCOSE BLD MANUAL STRIP-MCNC: 80 MG/DL (ref 74–99)
GLUCOSE SERPL-MCNC: 222 MG/DL (ref 74–99)
HCT VFR BLD AUTO: 27.8 % (ref 41–52)
HGB BLD-MCNC: 8.7 G/DL (ref 13.5–17.5)
INR PPP: 4.2 (ref 0.9–1.1)
MAGNESIUM SERPL-MCNC: 2.03 MG/DL (ref 1.6–2.4)
MCH RBC QN AUTO: 29.5 PG (ref 26–34)
MCHC RBC AUTO-ENTMCNC: 31.3 G/DL (ref 32–36)
MCV RBC AUTO: 94 FL (ref 80–100)
NRBC BLD-RTO: 0 /100 WBCS (ref 0–0)
PHOSPHATE SERPL-MCNC: 4.2 MG/DL (ref 2.5–4.9)
PLATELET # BLD AUTO: 109 X10*3/UL (ref 150–450)
POTASSIUM SERPL-SCNC: 4.3 MMOL/L (ref 3.5–5.3)
PROTHROMBIN TIME: 47.6 SECONDS (ref 9.8–12.8)
RBC # BLD AUTO: 2.95 X10*6/UL (ref 4.5–5.9)
SODIUM SERPL-SCNC: 140 MMOL/L (ref 136–145)
WBC # BLD AUTO: 6.6 X10*3/UL (ref 4.4–11.3)

## 2024-08-18 PROCEDURE — 2500000001 HC RX 250 WO HCPCS SELF ADMINISTERED DRUGS (ALT 637 FOR MEDICARE OP)

## 2024-08-18 PROCEDURE — 83735 ASSAY OF MAGNESIUM: CPT

## 2024-08-18 PROCEDURE — 82947 ASSAY GLUCOSE BLOOD QUANT: CPT

## 2024-08-18 PROCEDURE — 85610 PROTHROMBIN TIME: CPT | Performed by: NURSE PRACTITIONER

## 2024-08-18 PROCEDURE — 2060000001 HC INTERMEDIATE ICU ROOM DAILY

## 2024-08-18 PROCEDURE — 2500000001 HC RX 250 WO HCPCS SELF ADMINISTERED DRUGS (ALT 637 FOR MEDICARE OP): Performed by: NURSE PRACTITIONER

## 2024-08-18 PROCEDURE — 2500000005 HC RX 250 GENERAL PHARMACY W/O HCPCS

## 2024-08-18 PROCEDURE — 2500000004 HC RX 250 GENERAL PHARMACY W/ HCPCS (ALT 636 FOR OP/ED): Performed by: NURSE PRACTITIONER

## 2024-08-18 PROCEDURE — 80069 RENAL FUNCTION PANEL: CPT | Performed by: NURSE PRACTITIONER

## 2024-08-18 PROCEDURE — 2500000002 HC RX 250 W HCPCS SELF ADMINISTERED DRUGS (ALT 637 FOR MEDICARE OP, ALT 636 FOR OP/ED): Performed by: NURSE PRACTITIONER

## 2024-08-18 PROCEDURE — 85027 COMPLETE CBC AUTOMATED: CPT | Performed by: NURSE PRACTITIONER

## 2024-08-18 PROCEDURE — 36415 COLL VENOUS BLD VENIPUNCTURE: CPT | Performed by: NURSE PRACTITIONER

## 2024-08-18 PROCEDURE — 2500000002 HC RX 250 W HCPCS SELF ADMINISTERED DRUGS (ALT 637 FOR MEDICARE OP, ALT 636 FOR OP/ED)

## 2024-08-18 ASSESSMENT — COGNITIVE AND FUNCTIONAL STATUS - GENERAL
WALKING IN HOSPITAL ROOM: TOTAL
EATING MEALS: A LITTLE
MOVING TO AND FROM BED TO CHAIR: A LOT
TOILETING: A LOT
MOBILITY SCORE: 10
PERSONAL GROOMING: A LOT
DAILY ACTIVITIY SCORE: 13
STANDING UP FROM CHAIR USING ARMS: A LOT
CLIMB 3 TO 5 STEPS WITH RAILING: TOTAL
MOVING FROM LYING ON BACK TO SITTING ON SIDE OF FLAT BED WITH BEDRAILS: A LOT
HELP NEEDED FOR BATHING: A LOT
DRESSING REGULAR LOWER BODY CLOTHING: A LOT
DRESSING REGULAR UPPER BODY CLOTHING: A LOT
HELP NEEDED FOR BATHING: A LOT
TURNING FROM BACK TO SIDE WHILE IN FLAT BAD: A LOT
DRESSING REGULAR UPPER BODY CLOTHING: A LOT
DRESSING REGULAR LOWER BODY CLOTHING: A LOT

## 2024-08-18 ASSESSMENT — PAIN - FUNCTIONAL ASSESSMENT
PAIN_FUNCTIONAL_ASSESSMENT: 0-10

## 2024-08-18 ASSESSMENT — PAIN SCALES - GENERAL
PAINLEVEL_OUTOF10: 0 - NO PAIN

## 2024-08-18 NOTE — CARE PLAN
Problem: Pain - Adult  Goal: Verbalizes/displays adequate comfort level or baseline comfort level  Outcome: Progressing     Problem: Safety - Adult  Goal: Free from fall injury  Outcome: Progressing     Problem: Chronic Conditions and Co-morbidities  Goal: Patient's chronic conditions and co-morbidity symptoms are monitored and maintained or improved  Outcome: Progressing     Problem: Skin  Goal: Decreased wound size/increased tissue granulation at next dressing change  Outcome: Progressing  Goal: Participates in plan/prevention/treatment measures  Outcome: Progressing  Goal: Prevent/manage excess moisture  Outcome: Progressing  Goal: Prevent/minimize sheer/friction injuries  Outcome: Progressing  Goal: Promote/optimize nutrition  Outcome: Progressing  Goal: Promote skin healing  Outcome: Progressing     Problem: Diabetes  Goal: Maintain electrolyte levels within acceptable range throughout shift  Outcome: Progressing  Goal: Maintain glucose levels >70mg/dl to <250mg/dl throughout shift  Outcome: Progressing  Goal: No changes in neurological exam by end of shift  Outcome: Progressing  Goal: Learn about and adhere to nutrition recommendations by end of shift  Outcome: Progressing  Goal: Vital signs within normal range for age by end of shift  Outcome: Progressing  Goal: Increase self care and/or family involovement by end of shift  Outcome: Progressing  Goal: Receive DSME education by end of shift  Outcome: Progressing     Problem: Fall/Injury  Goal: Verbalize understanding of personal risk factors for fall in the hospital  Outcome: Progressing   The patient's goals for the shift include      The clinical goals for the shift include Patient will remain HDS this shift.

## 2024-08-18 NOTE — PROGRESS NOTES
"Orthopaedic Hand Surgery Progress Note  08/18/24    Subjective:  Patient evaluated in MICU. Resting comfortably in bed. Pain stable.    GOC discussion pending w/ family & MICU team.     Objective:  BP 81/59   Pulse 88   Temp 36.4 °C (97.5 °F)   Resp 19   Ht 1.753 m (5' 9.02\")   Wt 86.6 kg (190 lb 14.7 oz)   SpO2 94%   BMI 28.18 kg/m²     Gen: arousable, NAD, appropriately conversational  Cardiac: RRR to peripheral palpation  Resp: nonlabored on RA  GI: soft, non-distended  MSK:  LUE:  - Significant swelling with large effusion at the left wrist  - Surgical incision with sutures intact, scant bloody ss drainage, no expressible pus or gross purulence  - Appropriately tender at the left wrist, tolerates gentle ROM   - Motor intact in axillary/AIN/PIN/ulnar distributions  - SILT axillary/radial/median/ulnar distributions  - Hand wwp, 2+ radial pulse, cap refill brisk  - Compartments soft and compressible, no pain with passive stretch of digits      Results for orders placed or performed during the hospital encounter of 07/22/24 (from the past 24 hour(s))   POCT GLUCOSE   Result Value Ref Range    POCT Glucose 63 (L) 74 - 99 mg/dL   POCT GLUCOSE   Result Value Ref Range    POCT Glucose 150 (H) 74 - 99 mg/dL   POCT GLUCOSE   Result Value Ref Range    POCT Glucose 180 (H) 74 - 99 mg/dL   Magnesium   Result Value Ref Range    Magnesium 2.03 1.60 - 2.40 mg/dL   Renal function panel   Result Value Ref Range    Glucose 222 (H) 74 - 99 mg/dL    Sodium 140 136 - 145 mmol/L    Potassium 4.3 3.5 - 5.3 mmol/L    Chloride 101 98 - 107 mmol/L    Bicarbonate 26 21 - 32 mmol/L    Anion Gap 17 10 - 20 mmol/L    Urea Nitrogen 25 (H) 6 - 23 mg/dL    Creatinine 5.03 (H) 0.50 - 1.30 mg/dL    eGFR 14 (L) >60 mL/min/1.73m*2    Calcium 8.6 8.6 - 10.6 mg/dL    Phosphorus 4.2 2.5 - 4.9 mg/dL    Albumin 2.4 (L) 3.4 - 5.0 g/dL   CBC   Result Value Ref Range    WBC 6.6 4.4 - 11.3 x10*3/uL    nRBC 0.0 0.0 - 0.0 /100 WBCs    RBC 2.95 (L) 4.50 " - 5.90 x10*6/uL    Hemoglobin 8.7 (L) 13.5 - 17.5 g/dL    Hematocrit 27.8 (L) 41.0 - 52.0 %    MCV 94 80 - 100 fL    MCH 29.5 26.0 - 34.0 pg    MCHC 31.3 (L) 32.0 - 36.0 g/dL    RDW 18.9 (H) 11.5 - 14.5 %    Platelets 109 (L) 150 - 450 x10*3/uL   Protime-INR   Result Value Ref Range    Protime 47.6 (H) 9.8 - 12.8 seconds    INR 4.2 (H) 0.9 - 1.1   POCT GLUCOSE   Result Value Ref Range    POCT Glucose 258 (H) 74 - 99 mg/dL       MR brain wo IV contrast   Final Result   There is no MRI evidence of acute infarction on the diffusion   weighted images.        There is again evidence of mild-to-moderate brain parenchymal volume   loss.        Mild nonspecific white matter changes are noted within cerebral   hemispheres bilaterally. While nonspecific, white matter changes can   be seen with small-vessel ischemic change or demyelinating processes   among others.        MACRO:   None.        Signed by: Gerardo Calle 8/12/2024 6:45 AM   Dictation workstation:   [id number]      XR abdomen 1 view   Final Result   1.  No gross bowel distention. Moderate amount of stool within the   colon and correlate with a component of fecal impaction.        Signed by: Kfofi Stroud 8/7/2024 10:42 AM   Dictation workstation:   [id number]      CT head wo IV contrast   Final Result   Minimal cortical volume loss without hydrocephalus, hemorrhage or   extra-axial collection.        MACRO:   none        Signed by: Nakul Wang 8/6/2024 4:43 PM   Dictation workstation:   [id number]      Transthoracic Echo (TTE) Limited   Final Result      XR chest 1 view   Final Result   1.  Mildly increased perihilar/interstitial lung markings with hazy   bibasilar airspace opacities. Recommend clinical correlation as   findings could reflect developing interstitial/pulmonary edema.   Underlying infectious process can not definitively be excluded.   2. Persistent small bilateral pleural effusions and unchanged linear   atelectasis of the left lung base.         I personally reviewed the images/study and I agree with the findings   as stated by resident Neftaly Harkins. This study was interpreted   at [place], [place].        MACRO:   None        Signed by: Eugene Romero 7/31/2024 8:44 AM   Dictation workstation:   [id number]      Cardiac Catheterization Procedure   Final Result      XR wrist left 1-2 views   Final Result   No osseous abnormality seen. Soft tissue edema.             I personally reviewed the images/study and I agree with the findings   as stated by Alin Shearer MD, PGY-2 this study was interpreted at   [place], [place].        MACRO:   None        Signed by: Zach Ordonez 7/29/2024 1:00 PM   Dictation workstation:   [id number]      XR wrist left 1-2 views   Final Result   1. Mild nonspecific wrist soft tissue swelling. No soft tissue gas   2. Mild 1st CMC and triscaphe joint osteoarthrosis.             I personally reviewed the image(s)/study and resident interpretation.   I agree with the findings as stated by resident Luis Eduardo Helms.   Data analyzed and images interpreted at [place], [place].        MACRO:   None        Signed by: Yeni Mantilla 7/28/2024 1:27 PM   Dictation workstation:   [id number]      MR wrist left w and wo IV contrast   Final Result   1. No abnormal T1 marrow signal or enhancement to suggest   osteomyelitis.   2. Moderate-sized enhancing radiocarpal and midcarpal joint effusion.   Reactive, inflammatory and septic arthritis is in the differential.   Given clinical history of prior septic arthritis, joint fluid   aspiration may be of value to exclude underlying infection.   3. Nonspecific edema and enhancement of the thenar muscles may   represent infectious myositis in the appropriate clinical setting.                  I personally reviewed the images/study and I agree with the  findings   as stated. This study was interpreted at [place], [place].        MACRO:   None        Signed by: Zach Ordonez 7/27/2024 8:05 AM   Dictation workstation:   [id number]      CT chest abdomen pelvis w IV contrast   Final Result   CHEST:   1. Interval development of bilateral pulmonary nodular opacities   several of them showing cavitation. Findings are concerning for   septic embolism/cavitary pneumonia.   2. Bilateral pleural effusions.   3. Right femoral central venous catheter with tip terminating in the   right ventricle. Hypodense thrombus around the catheter in the right   atrium correlating with the vegetation seen on prior echocardiogram.   4. Severely attenuated superior vena cava, bilateral brachiocephalic,   subclavian and internal jugular veins with the calcifications in the   brachiocephalic veins with extensive collaterals in the chest wall   likely representing chronic thrombosis.        ABDOMEN-PELVIS:   1. Severely attenuated inferior vena cava with multiple   calcifications, consistent with chronic IVC thrombosis.   2. Interval development of a T10 vertebral body fracture.   Re-demonstration of severe osseous changes throughout the   thoracolumbar spine. Findings are likely secondary to renal   osteodystrophy.   3. Diffuse thickening of the colon and rectum with the pericolonic   stranding consistent with the proctocolitis which can be   inflammatory/infectious in etiology. Correlate with clinical symptoms.   4. Diffuse mesenteric haziness and anasarca likely representing fluid   overload.   5. Bilateral atrophied kidneys with hypoenhancement, representative   of medical renal disease.        I personally reviewed the images/study and I agree with the findings   as stated by Benjamin Campbell DO PGY-2. This study was interpreted at   [facility], [place], [place].        MACRO:   None        Signed by: Ivan  Judd De Diosjuvenal 7/25/2024 9:58 PM   Dictation workstation:   [id number]      Transesophageal Echo (POLI)   Final Result      Vascular US Lower Extremity Venous Duplex Right   Final Result      Transthoracic Echo (TTE) Limited   Final Result      Point of Care Ultrasound   Final Result      XR foot right 3+ views   Final Result   Ulceration in the posterior aspect of the calcaneus without   radiographic findings of osteomyelitis.        Severe demineralization of the bones which limits evaluation for   subtle fractures.        Questionable age-indeterminate 2nd, 3rd, and 4th metatarsal neck   fractures which may be however artifactual. Correlate with history   for trauma and point tenderness.        MACRO:   None.        Signed by: Yeni Mantilla 7/22/2024 2:54 PM   Dictation workstation:   [id number]      XR ankle right 3+ views   Final Result   Ulceration in the posterior aspect of the calcaneus without   radiographic findings of osteomyelitis.        Severe demineralization of the bones which limits evaluation for   subtle fractures.        Questionable age-indeterminate 2nd, 3rd, and 4th metatarsal neck   fractures which may be however artifactual. Correlate with history   for trauma and point tenderness.        MACRO:   None.        Signed by: Yeni Mantilla 7/22/2024 2:54 PM   Dictation workstation:   [id number]      XR chest 1 view   Final Result   Linear atelectasis in the left base. Small hazy opacities in the lung   bases similar compared to prior. Trace pleural effusions.   Signed by Roe Ruiz MD      Procedure aborted - Cath    (Results Pending)   Consult to Interventional Radiology    (Results Pending)       Assessment/Plan: 39 y.o. male s/p L wrist I&D on 7/29/2024 with Dr. Allen. Wound not yet healed in setting of chronic illness and poor healing potential. No gross purulence. Tolerates movement at the wrist, low concern for recurrent left wrist septic arthritis at this time    Plan:  -  Re-aspiration attempted x1 unsuccessful, patient refusing further attempts   - Wound reinforced with steri strips  - Recommend aggressive elevation above the level of the heart to minimize swelling  - Will continue to monitor pain/swelling/drainage  - Will reassess for remaining suture removal in 7-10 days (likely 8/21)  - Weight bearing: coffee cup weightbearing LUE  - DVT ppx: SCDs, chemoppx per primary  - Diet: OK for diet from an Orthopaedic perspective  - Pain: multimodal pain control per primary   - Antibiotics: Vanc per pharmacy   - Drains: none   - Ortho hand to follow peripherally until suture removal     Dispo: MICU pending [facility] discussion     Norbert Yin MD, MD  Orthopedic Surgery PGY-2  [facility]  Available by Epic Chat    While inpatient, this patient will be followed by the Orthopaedic Hand team. Please see contact information below:    Ortho Hand (beginning 8/19)  Koffi Vargas MD PGY2  Ino Flores MD PGY4    Ortho Hand (8/18)  Norbert Yin MD PGY2  Ankita Dyson MD PGY4    Please page [phone] (ortho on-call) after 6pm and on weekends.

## 2024-08-18 NOTE — PROGRESS NOTES
"Medical Intensive Care Stepdown Unit - Daily Progress Note   Subjective    Edwar Hemphill is a 39 y.o. year old male patient admitted on 7/22/2024 with septic shock    Interval History:  No events overnight. SBP maintained >80  Pt expressing he wishes to be discharged soon.    Mom at bedside, discussed potential discharge this week with referral to [facility]     Meds    Scheduled medications  atorvastatin, 40 mg, oral, Nightly  B complex-vitamin C-folic acid, 1 capsule, oral, Daily  bisacodyl, 10 mg, rectal, Daily  cholecalciferol, 50,000 Units, oral, Once per day on Monday Thursday  epoetin tyson or biosimilar, 10,000 Units, subcutaneous, Once per day on Monday Wednesday Friday  folic acid, 1 mg, oral, Daily  insulin lispro, 0-15 Units, subcutaneous, Before meals & nightly  levothyroxine, 150 mcg, oral, Daily before breakfast  melatonin, 6 mg, oral, Nightly  midodrine, 20 mg, oral, TID  nystatin, 1 Application, Topical, BID  pantoprazole, 40 mg, oral, BID AC  [START ON 9/6/2024] pantoprazole, 40 mg, oral, Daily before breakfast  polyethylene glycol, 17 g, oral, BID  QUEtiapine, 12.5 mg, oral, Nightly  sennosides-docusate sodium, 1 tablet, oral, BID  thiamine, 200 mg, oral, Daily  vancomycin 5 mg/mL + heparin 2500 units/mL in NS lock, 2 mL, intra-catheter, q48h  [Held by provider] warfarin, 2.5 mg, oral, Daily      Continuous medications     PRN medications  PRN medications: acetaminophen **OR** [DISCONTINUED] acetaminophen **OR** [DISCONTINUED] acetaminophen, alteplase, dextrose, dextrose, diphenhydrAMINE, glucagon, glucagon, HYDROmorphone, ondansetron, oxyCODONE, vancomycin     Objective    Blood pressure 81/59, pulse 88, temperature 36 °C (96.8 °F), resp. rate 16, height 1.753 m (5' 9.02\"), weight 86.6 kg (190 lb 14.7 oz), SpO2 94%.     Constitutional: pt in NAD, alert and cooperative  Eyes: PERRL, EOMI, no icterus   ENMT: mucous membranes moist, no apparent injury, no lesions seen  Head/Neck: Neck " supple, no apparent injury  Respiratory/Thorax: Lungs CTA bilaterally, non-labored breathing, no cough, on RA  Cardiovascular: Regular, rate and rhythm, no murmurs, normal S1 and S2  Gastrointestinal: Nondistended, soft, non-tender, BS present x 4  Musculoskeletal: RUE amputation (healed), L AKA (healed).  L wrist surgical site, wrapped in kerlix  Extremities: no edema, contusions or wounds  Neurological: alert and oriented x 3, speech clear, follows commands appropriately, cr. n. II-XII intact, sensation grossly intact, motor 5/5 throughout  Skin: Warm and dry, R fem HD line with CHG dressing;       Intake/Output Summary (Last 24 hours) at 8/18/2024 0640  Last data filed at 8/17/2024 0800  Gross per 24 hour   Intake 240 ml   Output 0 ml   Net 240 ml     Labs:   Results from last 72 hours   Lab Units 08/18/24  0439 08/17/24  0428 08/16/24  0416   SODIUM mmol/L 140 138 136   POTASSIUM mmol/L 4.3 3.6 3.8   CHLORIDE mmol/L 101 101 99   CO2 mmol/L 26 27 26   BUN mg/dL 25* 20 17   CREATININE mg/dL 5.03* 4.13* 3.60*   GLUCOSE mg/dL 222* 66* 113*   CALCIUM mg/dL 8.6 8.8 9.2   ANION GAP mmol/L 17 14 15   EGFR mL/min/1.73m*2 14* 18* 21*   PHOSPHORUS mg/dL 4.2 3.6 3.6      Results from last 72 hours   Lab Units 08/18/24  0439 08/17/24  0428 08/16/24  0416   WBC AUTO x10*3/uL 6.6 5.3 6.0   HEMOGLOBIN g/dL 8.7* 8.3* 9.1*   HEMATOCRIT % 27.8* 27.1* 29.7*   PLATELETS AUTO x10*3/uL 109* 94* 75*        Micro/ID:     Lab Results   Component Value Date    BLOODCULT No growth at 4 days -  FINAL REPORT 07/31/2024    BLOODCULT No growth at 4 days -  FINAL REPORT 07/31/2024       Summary of key imaging results from the last 24 hours  None    Assessment and Plan     Assessment: Edwar Hemphill is a 39 y.o. year old male patient with hx of ESRD on HD (MWF, right femoral line), HFrEF (EF 35-40% 5/2024), T2DM, PAD s/p L AKA and R above elbow amputation with multiple hospitalizations for L AKA infection and MRSA bacteremia  admitted on 7/22/2024  with septic shock admitted      Restraints: no     Summary for 08/17/24  :  No urgent need for HD today.  Tentative plan for HD on Mon  Ortho evaluated L wrist, no expression of pus.  Likely hematoma  Supratherapeutic INR, holding Warfarin     Plan:  NEUROLOGY/PSYCH:  Dx: Acute left wrist pain.  Delirium, confusion (resolved)  Management:  Pain reg: tylenol PRN, oxycodone prn, and Dilaudid prn   Melatonin HS,  Seroquel HS   Delirium Protocol      CARDIOVASCULAR:  Dx: Infective endocarditis with TV vegetation, Severely attenuated superior vena cava, bilateral brachiocephalic, subclavian and internal jugular veins with the calcifications in the brachiocephalic veins with extensive collaterals in the chest wall likely representing chronic thrombosis.  Septic shock, off pressors since 8/4.  Persistent hypotension (asymptomatic).  Hx of HLD  SBP >80's  Management:  Home Meds: Atorvastatin 40 mg daily, Midodrine 10 mg QID  Patient had vegectomy aborted due to to concerns for infected thrombus in the IVC.   Continue Atorvastatin 40 mg daily  Midodrine 20 mg TID     PULMONARY:  Dx: No acute issues   Management:  Stable on RA     RENAL/GENITOURINARY:  Dx: Hx ESRD on iHD MWF   Management:  Last iHD on 8/15, U/F 8/15 and 8/16  IR evaluated on 8/14- Risk of losing access and infection risk are too high. As line is currently working, no indication to exchange as it is functioning appropriately and it is his only functioning site of access.  HD on 8/19     GASTROENTEROLOGY:  Dx: Hx of GERD, Esophagitis, Recent GIB (6/2024) s/p clipping x2. Constipation, resolved.    Mother bringing in food and taking in adequate PO intake  Management:  Diet: Regular and Nepro supplements  Bowel Regimen: Dulcolax suppository daily, Aracelis-colace BID, and Miralax BID   GI recommending BID PPI x1 month then switch to daily indefinitely. Will need to follow up with Dr. No Kimball as O/p      ENDOCRINOLOGY:  Dx: DM, Hypothyroid.  Euvolemic DKA  (resolved) 2/2 poor PO intake.  Hypoglycemia episodes 2/2 poor PO intake  Management:  A1C: 6.8 3(3/2024)  Blood sugars    8/12: TSH 7.48, Free T4 1.01   Continue Holding Glargine 10 U HS  2/2 hypoglycemia  Continue  SSI, accu checks and hypoglycemia protocol   Continue Levothyroxine 150mg daily.     HEMATOLOGY:  Dx: Anema of chronic disease, Thrombocytopenia, Chronic thrombosis of IVC, SVC, blanca brachiocephalic, Subclavian, internal jugular and chronic changes in L CFV & Prox fem veins  MRI head with no evidence of bleeding or mycotic aneurysm   Required 2 U pRBC on 7/24 and 7/29.   No surgical options available as per Vascular Surgery recs from  8/14  Vascular Medicine following, for management of AC  Started on heparin drip 7/31 and now transitioned to Coumadin, Heparin gtt stopped 8/12  Management:  Continue Coumadin 2.5 mg==> hold tonight (INR 4.2)  Monitor for bleeding given recent GIB  Daily INR monitor, goal INR 2-3     MUSCULOSKELETAL/ SKIN:  Dx: L wrist septic arthritis, s/p I&D 7/29 with ortho  Ortho hand contacted 8/13.  Reevaluated 8/14.  Unsuccessful attempt to aspirate fluid from joint.  As no purulent drainage noted thinking likely hematoma.         Management   Aggressively elevate LUE  Keep ortho hand team notified of anticipated discharge date   8/16, per Ortho, will reassess for remaining suture removal in 7-10 days (likely 8/21)      INFECTIOUS DISEASE:  Dx: MRSA bacteremia, Infective Endocarditis (TV),  L wrist septic arthritis s/p I&D (7/29), septic shock (resolved).  Hx of Acinetobacter  Management:  Tmax: 36 C, WBC 6.6  MICRO: 7/22 and 7/25 BC with MRSA, 7/27 BC Staph Haemolyticus; 7/27 synovial fluid from Left wrist MRSA, BC 7/29 and 7/31 Neg, 7/29 Fungal cx (tissue) pending, Bacterial cx neg  Antimicrobials: Continue vancomycin post-HD through end date 9/13   Will instill vanc into HD cath after each HD session  Will trial indefinite suppression with Bactrim 1SS daily after vancomycin  course (ID follow up on 9/10)   Follow up with ID, Dr Doroteo Graham 9/10      ICU Check List         FEN  Fluids: PRN  Electrolytes: PRN  Nutrition: diabetic Diet + supplements  Prophylaxis:  DVT ppx: Coumadin  GI ppx: PPI BID  Bowel care: Dulcolax suppository daily, Miralax BID, and Aracelis-Colace BID  Hardware:  Catheter: none  Access: Right femoral tunneled HD line  Lines: Left EJ   Social:  Code:DNR/DNRI  HPOA: Shanthi Hemphill, Parent, [phone]   Disposition: Continue in SDU care for tx  Discharge: Plan to return to [facility].  Send referral for [facility]igator program on day of discharge.  Possible discharge this week if tolerating HD       AMARILYS Shiekh-CNP   08/18/24 at 6:40 AM     Pt discussed with Dr. Ferrell, seen and examined. All labs, VS and previous plan of care reviewed.  I spent 45 minutes in the professional and overall care of this patient.      Disclaimer: Documentation completed with the information available at the time of input. The times in the chart may not be reflective of actual patient care times, interventions, or procedures. Documentation occurs after the physical care of the patient.

## 2024-08-19 ENCOUNTER — APPOINTMENT (OUTPATIENT)
Dept: DIALYSIS | Facility: HOSPITAL | Age: 39
End: 2024-08-19
Payer: COMMERCIAL

## 2024-08-19 LAB
ABO GROUP (TYPE) IN BLOOD: NORMAL
ALBUMIN SERPL BCP-MCNC: 2.6 G/DL (ref 3.4–5)
ANION GAP SERPL CALC-SCNC: 15 MMOL/L (ref 10–20)
ANTIBODY SCREEN: NORMAL
BUN SERPL-MCNC: 28 MG/DL (ref 6–23)
CALCIUM SERPL-MCNC: 8.6 MG/DL (ref 8.6–10.6)
CHLORIDE SERPL-SCNC: 102 MMOL/L (ref 98–107)
CO2 SERPL-SCNC: 25 MMOL/L (ref 21–32)
CREAT SERPL-MCNC: 5.62 MG/DL (ref 0.5–1.3)
EGFRCR SERPLBLD CKD-EPI 2021: 12 ML/MIN/1.73M*2
ERYTHROCYTE [DISTWIDTH] IN BLOOD BY AUTOMATED COUNT: 19 % (ref 11.5–14.5)
FUNGUS SPEC CULT: NORMAL
FUNGUS SPEC CULT: NORMAL
FUNGUS SPEC FUNGUS STN: NORMAL
FUNGUS SPEC FUNGUS STN: NORMAL
GLUCOSE BLD MANUAL STRIP-MCNC: 126 MG/DL (ref 74–99)
GLUCOSE BLD MANUAL STRIP-MCNC: 182 MG/DL (ref 74–99)
GLUCOSE BLD MANUAL STRIP-MCNC: 304 MG/DL (ref 74–99)
GLUCOSE BLD MANUAL STRIP-MCNC: 363 MG/DL (ref 74–99)
GLUCOSE BLD MANUAL STRIP-MCNC: 52 MG/DL (ref 74–99)
GLUCOSE BLD MANUAL STRIP-MCNC: 95 MG/DL (ref 74–99)
GLUCOSE SERPL-MCNC: 50 MG/DL (ref 74–99)
HBV SURFACE AG SERPL QL IA: NONREACTIVE
HCT VFR BLD AUTO: 27.8 % (ref 41–52)
HGB BLD-MCNC: 8.6 G/DL (ref 13.5–17.5)
INR PPP: 4.6 (ref 0.9–1.1)
MAGNESIUM SERPL-MCNC: 1.98 MG/DL (ref 1.6–2.4)
MCH RBC QN AUTO: 29.4 PG (ref 26–34)
MCHC RBC AUTO-ENTMCNC: 30.9 G/DL (ref 32–36)
MCV RBC AUTO: 95 FL (ref 80–100)
NRBC BLD-RTO: 0 /100 WBCS (ref 0–0)
PHOSPHATE SERPL-MCNC: 3.7 MG/DL (ref 2.5–4.9)
PLATELET # BLD AUTO: 118 X10*3/UL (ref 150–450)
POTASSIUM SERPL-SCNC: 3.7 MMOL/L (ref 3.5–5.3)
PROTHROMBIN TIME: 53.3 SECONDS (ref 9.8–12.8)
RBC # BLD AUTO: 2.93 X10*6/UL (ref 4.5–5.9)
RH FACTOR (ANTIGEN D): NORMAL
SODIUM SERPL-SCNC: 138 MMOL/L (ref 136–145)
VANCOMYCIN SERPL-MCNC: 31.2 UG/ML (ref 5–20)
WBC # BLD AUTO: 6.5 X10*3/UL (ref 4.4–11.3)

## 2024-08-19 PROCEDURE — 2500000005 HC RX 250 GENERAL PHARMACY W/O HCPCS

## 2024-08-19 PROCEDURE — 85610 PROTHROMBIN TIME: CPT | Performed by: NURSE PRACTITIONER

## 2024-08-19 PROCEDURE — 85027 COMPLETE CBC AUTOMATED: CPT | Performed by: NURSE PRACTITIONER

## 2024-08-19 PROCEDURE — 87340 HEPATITIS B SURFACE AG IA: CPT | Performed by: INTERNAL MEDICINE

## 2024-08-19 PROCEDURE — 36415 COLL VENOUS BLD VENIPUNCTURE: CPT | Performed by: NURSE PRACTITIONER

## 2024-08-19 PROCEDURE — 36415 COLL VENOUS BLD VENIPUNCTURE: CPT | Performed by: INTERNAL MEDICINE

## 2024-08-19 PROCEDURE — 82533 TOTAL CORTISOL: CPT

## 2024-08-19 PROCEDURE — 1200000002 HC GENERAL ROOM WITH TELEMETRY DAILY

## 2024-08-19 PROCEDURE — 82947 ASSAY GLUCOSE BLOOD QUANT: CPT

## 2024-08-19 PROCEDURE — 2500000002 HC RX 250 W HCPCS SELF ADMINISTERED DRUGS (ALT 637 FOR MEDICARE OP, ALT 636 FOR OP/ED): Performed by: NURSE PRACTITIONER

## 2024-08-19 PROCEDURE — 2500000001 HC RX 250 WO HCPCS SELF ADMINISTERED DRUGS (ALT 637 FOR MEDICARE OP): Performed by: NURSE PRACTITIONER

## 2024-08-19 PROCEDURE — 83735 ASSAY OF MAGNESIUM: CPT

## 2024-08-19 PROCEDURE — 80069 RENAL FUNCTION PANEL: CPT | Performed by: NURSE PRACTITIONER

## 2024-08-19 PROCEDURE — 2500000001 HC RX 250 WO HCPCS SELF ADMINISTERED DRUGS (ALT 637 FOR MEDICARE OP)

## 2024-08-19 PROCEDURE — 8010000001 HC DIALYSIS - HEMODIALYSIS PER DAY

## 2024-08-19 PROCEDURE — 90935 HEMODIALYSIS ONE EVALUATION: CPT | Performed by: INTERNAL MEDICINE

## 2024-08-19 PROCEDURE — 6350000001 HC RX 635 EPOETIN >10,000 UNITS: Mod: JZ

## 2024-08-19 PROCEDURE — 86901 BLOOD TYPING SEROLOGIC RH(D): CPT

## 2024-08-19 PROCEDURE — 2500000002 HC RX 250 W HCPCS SELF ADMINISTERED DRUGS (ALT 637 FOR MEDICARE OP, ALT 636 FOR OP/ED)

## 2024-08-19 PROCEDURE — 80202 ASSAY OF VANCOMYCIN: CPT | Performed by: PHARMACIST

## 2024-08-19 ASSESSMENT — COGNITIVE AND FUNCTIONAL STATUS - GENERAL
EATING MEALS: A LITTLE
WALKING IN HOSPITAL ROOM: TOTAL
TURNING FROM BACK TO SIDE WHILE IN FLAT BAD: A LOT
DRESSING REGULAR LOWER BODY CLOTHING: A LOT
EATING MEALS: A LITTLE
CLIMB 3 TO 5 STEPS WITH RAILING: TOTAL
TOILETING: A LOT
MOBILITY SCORE: 10
DRESSING REGULAR UPPER BODY CLOTHING: A LOT
MOVING TO AND FROM BED TO CHAIR: TOTAL
TURNING FROM BACK TO SIDE WHILE IN FLAT BAD: TOTAL
PERSONAL GROOMING: A LOT
MOVING FROM LYING ON BACK TO SITTING ON SIDE OF FLAT BED WITH BEDRAILS: TOTAL
MOVING TO AND FROM BED TO CHAIR: A LOT
STANDING UP FROM CHAIR USING ARMS: TOTAL
HELP NEEDED FOR BATHING: A LOT
STANDING UP FROM CHAIR USING ARMS: A LOT
CLIMB 3 TO 5 STEPS WITH RAILING: TOTAL
DAILY ACTIVITIY SCORE: 13
DRESSING REGULAR LOWER BODY CLOTHING: A LOT
STANDING UP FROM CHAIR USING ARMS: TOTAL
MOVING TO AND FROM BED TO CHAIR: TOTAL
TURNING FROM BACK TO SIDE WHILE IN FLAT BAD: TOTAL
MOVING FROM LYING ON BACK TO SITTING ON SIDE OF FLAT BED WITH BEDRAILS: A LOT
DAILY ACTIVITIY SCORE: 13
WALKING IN HOSPITAL ROOM: TOTAL
WALKING IN HOSPITAL ROOM: TOTAL
MOVING FROM LYING ON BACK TO SITTING ON SIDE OF FLAT BED WITH BEDRAILS: TOTAL
MOBILITY SCORE: 6
PERSONAL GROOMING: A LOT
HELP NEEDED FOR BATHING: A LOT
DRESSING REGULAR UPPER BODY CLOTHING: A LOT
MOBILITY SCORE: 6
TOILETING: A LOT
CLIMB 3 TO 5 STEPS WITH RAILING: TOTAL

## 2024-08-19 ASSESSMENT — PAIN SCALES - GENERAL
PAINLEVEL_OUTOF10: 0 - NO PAIN

## 2024-08-19 ASSESSMENT — PAIN SCALES - WONG BAKER: WONGBAKER_NUMERICALRESPONSE: NO HURT

## 2024-08-19 ASSESSMENT — PAIN - FUNCTIONAL ASSESSMENT: PAIN_FUNCTIONAL_ASSESSMENT: NO/DENIES PAIN

## 2024-08-19 NOTE — PROGRESS NOTES
08/19/24 1600   Discharge Planning   Living Arrangements Other (Comment)  (LTC at [facility])   Support Systems Parent;Family members   Type of Residence Nursing home/residential care   Home or Post Acute Services Post acute facilities (Rehab/SNF/etc)   Type of Post Acute Facility Services Long term care  ([facility])   Expected Discharge Disposition Hospice Res  (return to [facility], possibly under [facility] care)   Does the patient need discharge transport arranged? Yes   RoundTrip coordination needed? Yes     PRISCILA spoke with NP asking about pt receiving abx at dialysis.  PRISCILA spoke with Kiya charge nurse at [facility] [phone].  Pt can receive abx at dialysis.  Nephrology or ID MD will need to speak with medical director at [facility] to see if abx can be administered.  [facility] will also need the physician name and fax number who is going to follow up abx treatment and labs.  Above was relayed to care team.  Pt wanting to leave the hospital.  PRISCILA explained pt needed IV abx and there needed to be discussion between medical team at  and medical team at [facility].  Pt agreed to stay.  YEE Warren

## 2024-08-19 NOTE — PROGRESS NOTES
Edwar Hemphill is a 39 y.o. male on day 28 of admission presenting with Septic shock (Multi).    Renal Staff HD Note    Patient seen- orders reviewed. Tolerating treatment well.   Hypotension in the past.     BP Readings from Last 3 Encounters:   08/19/24 86/58   06/01/24 132/77   05/26/24 104/65     [unfilled]  Lab Results   Component Value Date    CREATININE 5.62 (H) 08/19/2024    BUN 28 (H) 08/19/2024     08/19/2024    K 3.7 08/19/2024     08/19/2024    CO2 25 08/19/2024     Lab Results   Component Value Date    CALCIUM 8.6 08/19/2024    CAION 1.20 07/27/2024    PHOS 3.7 08/19/2024     @  Lab Results   Component Value Date    HGB 8.6 (L) 08/19/2024     Principal Problem:    Septic shock (Multi)  Active Problems:    Acute infective endocarditis (HHS-HCC)    Tricuspid valve vegetation (HHS-HCC)    Staphylococcal arthritis of left wrist (Multi)    Infection of left wrist (Multi)    Plan-  Taking over service today.   Continue treatment per submitted orders   Minimal volume removal - 700 ml  Chart reviewed and notes reviewed.  Tolerated Rx today- BP at 83/58    Betina Belcher MD

## 2024-08-19 NOTE — PROGRESS NOTES
Vancomycin Dosing by Pharmacy  FOLLOW UP    Edwar Hemphill is a 39 y.o. male who Pharmacy is consulted to dose vancomycin for bacteremia.     Based on the patient's indication and renal status, this patient is being dosed based on a goal vancomycin pre-HD level of 20-25.     Current vancomycin dose: No recent dose due to supratherpeutic level    Most recent vancomycin level: 31.2 mcg/mL (n/a hour level)    Renal function is currently dependent on Intermittent Hemodialysis (IHD).    Estimated Creatinine Clearance: 19.1 mL/min (A) (by C-G formula based on SCr of 5.62 mg/dL (H)).    Vancomycin   Date Value Ref Range Status   08/19/2024 31.2 (H) 5.0 - 20.0 ug/mL Final   08/16/2024 26.3 (H) 5.0 - 20.0 ug/mL Final   08/14/2024 17.8 5.0 - 20.0 ug/mL Final   08/12/2024 17.5 5.0 - 20.0 ug/mL Final     Vancomycin, Trough   Date Value Ref Range Status   07/24/2024 26.7 (HH) 5.0 - 20.0 ug/mL Final     Comment:     Therapeutic Ranges:    Peak (all ages):        30.0-40.0 ug/mL                       Trough (all ages):        10.0-20.0 ug/mL                                             Vancomycin trough concentrations drawn immediately prior to the next dose at steady-state are preferred for concentration-guided monitoring of patients treated with vancomycin.    Reference: Am J Health-Syst Pharm. 2020; 77(11):832-767.        11/15/2023 24.4 (HH) 5.0 - 20.0 ug/mL Final     Comment:     Therapeutic Ranges:    Peak (all ages):        30.0-40.0 ug/mL                       Trough (all ages):        10.0-20.0 ug/mL                                             Vancomycin trough concentrations drawn immediately prior to the next dose at steady-state are preferred for concentration-guided monitoring of patients treated with vancomycin.    Reference: Am J Health-Syst Pharm. 2020; 77(11):835-864.        11/13/2023 17.8 5.0 - 20.0 ug/mL Final     Comment:     Therapeutic Ranges:    Peak (all ages):        30.0-40.0 ug/mL                        Trough (all ages):        10.0-20.0 ug/mL                                             Vancomycin trough concentrations drawn immediately prior to the next dose at steady-state are preferred for concentration-guided monitoring of patients treated with vancomycin.    Reference: Am J Health-Syst Pharm. 2020; 77(11):835-864.          Vancomycin Tr   Date Value Ref Range Status   02/21/2023 CANCELED       Comment:     Result canceled by the ancillary.   02/16/2023 20.5 (HH) 5.0 - 20.0 ug/mL Final     Comment:      Vancomycin levels should be interpreted in conjunction   with the dose, disease being treated, vancomycin HUMBERTO,   time of draw (trough concentrations should be   obtained just before the next dose at steady-state),   and other clinical information. Trough concentrations   of 15-20 ug/mL are desired for severe infections.   Ref.: Am J Health-Syst Pharm 66: 83-98, 2009.   08:18 02/16/2023.    Called vanct- RB to Debbie Michel, 02/16/2023 08:18     02/14/2023 15.8 5.0 - 20.0 ug/mL Final     Comment:      Vancomycin levels should be interpreted in conjunction   with the dose, disease being treated, vancomycin HUMBERTO,   time of draw (trough concentrations should be   obtained just before the next dose at steady-state),   and other clinical information. Trough concentrations   of 15-20 ug/mL are desired for severe infections.   Ref.: Am J Health-Syst Pharm 66: 83-98, 2009.     02/11/2023 17.1 5.0 - 20.0 ug/mL Final     Comment:      Vancomycin levels should be interpreted in conjunction   with the dose, disease being treated, vancomycin HUMBERTO,   time of draw (trough concentrations should be   obtained just before the next dose at steady-state),   and other clinical information. Trough concentrations   of 15-20 ug/mL are desired for severe infections.   Ref.: Am J Health-Syst Pharm 66: 83-98, 2009.     02/11/2023 16.9 5.0 - 20.0 ug/mL Final     Comment:      Vancomycin levels should be interpreted in conjunction   with  the dose, disease being treated, vancomycin HUMBERTO,   time of draw (trough concentrations should be   obtained just before the next dose at steady-state),   and other clinical information. Trough concentrations   of 15-20 ug/mL are desired for severe infections.   Ref.: Am J Health-Syst Pharm 66: 83-98, 2009.         Results from last 7 days   Lab Units 08/19/24  0446 08/18/24  0439 08/17/24  0428 08/16/24  0416   CREATININE mg/dL 5.62* 5.03* 4.13* 3.60*   BUN mg/dL 28* 25* 20 17   WBC AUTO x10*3/uL 6.5 6.6 5.3 6.0        Visit Vitals  BP 71/57 (BP Location: Left arm, Patient Position: Lying)   Pulse 81   Temp 36.1 °C (97 °F)   Resp 15       Gram Stain   Date/Time Value Ref Range Status   07/29/2024 10:45 PM (2+) Few Polymorphonuclear leukocytes  Final   07/29/2024 10:45 PM No organisms seen  Final     Blood Culture   Date/Time Value Ref Range Status   07/31/2024 10:21 AM No growth at 4 days -  FINAL REPORT  Final   07/31/2024 10:21 AM No growth at 4 days -  FINAL REPORT  Final     Tissue/Wound Culture/Smear   Date/Time Value Ref Range Status   07/29/2024 10:45 PM No growth aerobically and anaerobically  Final        Susceptibility data from last 90 days.  Collected Specimen Info Organism Clindamycin Erythromycin Oxacillin Rifampin Tetracycline Trimethoprim/Sulfamethoxazole Vancomycin   07/27/24 Fluid from Synovial Methicillin Resistant Staphylococcus aureus (MRSA)  R  R  R   R  S  S   07/27/24 Blood culture from Peripheral Venipuncture Staphylococcus haemolyticus  R  R  R   S  R  S   07/25/24 Blood culture from Peripheral Venipuncture Staphylococcus aureus          07/25/24 Blood culture from Peripheral Venipuncture Staphylococcus aureus          07/22/24 Blood culture from Peripheral Venipuncture Methicillin Resistant Staphylococcus aureus (MRSA)  R  R  R  I  R  S  S   07/22/24 Blood culture from Peripheral Venipuncture Staphylococcus aureus              Assessment/Plan    Vancomycin pre-HD level is above goal range  of 20-25. Will hold post-HD dose today.    The next vancomycin level will be ordered for 8/21 at AM labs (before next HD session, unless clinically indicated sooner.  Will continue to monitor renal function daily while on vancomycin and order serum creatinine at least every 48 hours if not already ordered.  Will follow for continued vancomycin needs, clinical response, and signs/symptoms of toxicity.       Neftaly Sutton, PharmD

## 2024-08-19 NOTE — CARE PLAN
Problem: Pain - Adult  Goal: Verbalizes/displays adequate comfort level or baseline comfort level  Outcome: Progressing     Problem: Safety - Adult  Goal: Free from fall injury  Outcome: Progressing     Problem: Chronic Conditions and Co-morbidities  Goal: Patient's chronic conditions and co-morbidity symptoms are monitored and maintained or improved  Outcome: Progressing     Problem: Skin  Goal: Decreased wound size/increased tissue granulation at next dressing change  Outcome: Progressing  Goal: Participates in plan/prevention/treatment measures  Outcome: Progressing  Goal: Prevent/manage excess moisture  Outcome: Progressing  Goal: Prevent/minimize sheer/friction injuries  Outcome: Progressing  Goal: Promote/optimize nutrition  Outcome: Progressing  Goal: Promote skin healing  Outcome: Progressing     Problem: Diabetes  Goal: Maintain electrolyte levels within acceptable range throughout shift  Outcome: Progressing  Goal: Maintain glucose levels >70mg/dl to <250mg/dl throughout shift  Outcome: Progressing  Goal: No changes in neurological exam by end of shift  Outcome: Progressing  Goal: Learn about and adhere to nutrition recommendations by end of shift  Outcome: Progressing  Goal: Vital signs within normal range for age by end of shift  Outcome: Progressing  Goal: Increase self care and/or family involovement by end of shift  Outcome: Progressing  Goal: Receive DSME education by end of shift  Outcome: Progressing     Problem: Fall/Injury  Goal: Verbalize understanding of personal risk factors for fall in the hospital  Outcome: Progressing   The patient's goals for the shift include      The clinical goals for the shift include Patient will remain HDS

## 2024-08-19 NOTE — PROGRESS NOTES
SDU to Floor Transfer Summary & Floor Readiness Note     I, personally, evaluated Edwar Hemphill prior to transfer to the floor, including reviewing all current laboratory and imaging studies. The patient remains appropriate for transfer to the floor. Bedside nurse and respiratory therapy are also in agreement of patient's readiness for the floor.        I:  Hospital Course   Edwar Hemphill is a 39 y.o. male with PMHx significant for ESRD (MWF  via right femoral line), NICM,  HFrEF (LVEF 35-40% on 5/2024 POLI), anemia, DM2, Left AKA, RUE amputation, HTN, PVD, GERD.  Patient presented to his dialysis session last night. and was found to be hypotensive 70s/40s, and was told to brought to the ED.  Upon presentation in the ED, /76, RR 16, afebrile, tachycardic (109) on room air.  Patient's labs revealed hyponatremia, hypokalemia at 3.1, creatinine of 6.88 (patient is ESRD) lactate 3.3, WBC 20 K, hemoglobin 7.3 (baseline), platelets 65 (baseline 180s).  Patient was also evaluated by POCUS and found to have collapsible IVC, + BC 2/4 growing GPC.  Patient was started on Vanc/ Zosyn as c/f sepsis (patient has a history of MRSA sepsis) and received 2.5 L of LR in the ED as well as started on low-dose pressors.     MICU course:  Admitted  with septic shock required pressors and infective endocarditis with large TV vegetation on TTE, w/ bcx growing GPCs with staph aureus (with history MRSA bacteremia before) and Acinetobacter baumanni positive on his wound culture. Started on vanc/zosyn, IVFresuscitated, ID, cardiology and vascular medicine consulted. TTE showed TV vegetation and possible source could be femoral catheter with attached vegetations.  Cardiology consulted and advised for POLI. Patient continued on CVVH based on nephrology consult (plan was to continue dialysis before we take the line out). He had a drop in his plts from 65 >37 s/p 2 units plts before POLI procedure and plts uptrended to 57. Zosyn changed to  ceftaroline on 7/23 and continued with vancomycin. POLI was done on 7/24 and showed, a large vegetation that circumferentially encases the femoral TDC ( catheter) and presented PFO. We were not able to take off the line as a source of infection given his DVTs in subclavian, LIJ, b/l innominate occlusion and complete occlusion of intrahepatic IVC its hard to find another access at this time. IR, cardiology, ID, Vascular medicine were on board in terms of decision for taking this line off. Because of risk of shedding from the attached veg planned to take this line off with surgery.     Patient was complaining of wrist pain and ID recommended with MRI and synovial tap given his history of osteomyelitis with MRSA. MRI was unremarkable, synovial tap was done and showed less than wbc and culture is still pending.   GI consulted given his history of GIB in June and drop in his Hb in terms of starting anticoagulation. Per GI risk of bleeding is less than thrombosis and they referred the decision of anticoagulation therapy to primary team. Given his HB drop and low plts we held on anticoagulation for now.   Cardiac surgery consulted and they recommended CTA and consult with endovascular team for possible vagectomy, given his multiple thrombi in SVC, IVC and difficulties on getting access for bypass.  CTA showed severely attenuated superior vena cava, bilateral brachiocephalic, subclavian and internal jugular veins with the calcifications in the brachiocephalic veins with extensive collaterals in the chest wall likely representing chronic thrombosis. Endovascular consulted and they tentatively planning for vegectomy with Dr. Dennis Monday 7/29. Vegectomy aborted due to to concerns for infected thrombus in the IVC and potential incomplete treatment without removal of the TDC.      Synovial fluid growing MRSA.  Unable to tolerate HD and required SLED and low dose levo.  Patient weaned of pressors; continue with Midodrine.   Cultures from 7/29 (blood) negative.  Vasc med consulted for homegoing recommendations iso recent GIB and PMHx of DVTs.  Pt completed course of ceftaroline (7/23-8/6); per ID will continue with Vanco for 6-8 weeks (end date 9/13/2024).  CT head without evidence of intracranial mass or extra-axial collection. Patient tolerated iHD on 8/7 and transferred to SDU as need to utilizing HD cath venous (blue) port for Heparin infusion initiated on 7/31 due to limited IV access.       SDU course:   In SDU, Midodrine decreased from 15mg Q8hrs to 10mg BID during day and 15mg HS.  Patient developed euvolemic DKA on 8/11, resolved, now hypoglycemia d/t poor appetite.  Vascular medicine following; continued on heparin drip and transitioned to Coumadin with INR therapeutic x 2 on 8/11 (2.3) and 8/12 (3.0); Heparin discontinued on 8/12.  Continue vancomycin post-HD through end date 9/13; instill vanc into HD cath after each HD session.  Will trial indefinite suppression with Bactrim 1SS daily after vancomycin course per ID.  Will need f/up with ID (Dr Doroteo Graham on 9/10).    IR consulted on 8/13 to evaluate if right femoral HD catheter can be exchanged for one with a pigtail or if a tunnelled PICC line could be placed next to it. Current Right common femoral vein tunneled dialysis catheter placed in IR on 5/24/2024.   IR not able to exchange current dialysis line as risk too high that they may not be able to reestablish access.  8/14 seen by Ortho hand, unsuccessful aspiration attempt of left wrist. This is likely a  hematoma given no purulent drainage.  Plan to keep elevated to help with swelling.    Due to hypotension midodrine increased back to 15mg TID.   Partial session of iHD d/t hypotension.  Ultrafiltration done on 8/15-16.    Given poor overall prognosis GOIC discussion held and pt was made DNAR/DNI.  Hospice was consulted     8/17 patient met with Hospice, not wishing to persue hospice at this time.     8/19 Patient  underwent iHD at bedside and tolerated well from BP standpoint.  Plan to transfer to [facility].  Discharge plan is to return to [facility] once IV atb coordinated with  ID MD and [facility] Nephrologist.  Dr. Mckeon is medical director at [facility] [place] office [phone] cell [phone]. [facility] MD needs to speak with nephrology or ID before accepting pt with Vanco.  [facility] will need a physician and fax number for results of titrating.      MICRO:   - Blood cx + MRSA On 7/22 & 7/25  - Blood cx + Staph Haemolyticus; Synovial fluid from Left wrist MRSA, on  7/27  - Blood cx negative on 7/29 and 7/31 (final)  - Fungal cx (tissue) pending 7/29, Bacterial cx neg     Antibiotic:   - Vancomycin post-HD through end date 9/13     At time of discharge please send referral for [facility]igator program on day of discharge                 C: Code Status/DPOA Info/Goals of Care/ACP Note    DNR and No Intubation  DPOA/Contact Number: Shanthi Hemphill, Mother, [phone]     U: Unprescribing & Pertinent High-Risk Medications    Changes to home meds: Increased home midodrine from 10mg QID to 20mg TID. Holding home Lantus 10u 2/2 episodes of hypoglycemia      Anticoagulation: Warfarin, currently held as INR is supratherapeutic     Antibiotics:   [] N/A - no current planned antimicrobioals  [x]  Vancomycin for MRSA bacteremia, infective endocarditis, and left wrist septic arthritis planned duration through 9/13    P: Pending Tests at the Time of Transfer   None      A: Active consultants, including Rehab:   []  Subspecialty Consultants: nephrology, vascular medicine, palliative care  [x]  PT  [x]  OT  []  SLP  [x]  Wound Care    U: Uncertainty Measure/Diagnostic Pause:    Working diagnosis at the time of transfer septic shock 2/2 MRSA bacteremia and infective endocarditis     Diagnosis Degree of Certainty: 1. High degree of certainty about the clinical diagnosis.     S: Summary of Major Problems and To-Dos:   #MRSA bacteremia,  Infective Endocarditis (TV)  - Vegectomy aborted d/t infected thrombus in IVC  - Continue vancomycin post-HD through end date 9/13   - Will instill vanc into HD cath after each HD session   - Will trial indefinite suppression with Bactrim 1SS daily after vancomycin course   - Follow up with ID, Dr Doroteo Graham 9/10     #L wrist septic arthritis s/p I&D (7/29).  - Ortho Hand following. Last seen on 8/16 due swelling at incision. Likely hematoma as opposed to infection.  Will assess for remaining suture removal in 7/10 days, likely 8/21    #Hypotension  - Continue Midodrine 20mg TID  - Off pressors since 8/4    #Chronic thrombosis of IVC, SVC, blanca brachiocephalic, Subclavian, internal jugular and chronic changes in L CFV & Prox fem veins   - Vascular Surgery consulted: No surgical options available   - Vascular medicine following for management of AC.  On Warfarin which is currently being held d/t supratherapeutic INR  - INR goal 2-3    #ESRD on iHD MWF  - Normally undergoes dialysis at [facility] [place] MWF  -  IR evaluated on 8/14 for potential line replacement: Risk of losing access and infection risk are too high. As line is currently working, no indication to exchange as it is functioning appropriately and it is his only functioning site of access.     To-do list prior to transfer:  [x]Tolerate iHD        E: Exam, including Lines/Drains/Airways & Data Review:     Physical Exam:  Constitutional: chronically ill appearing pt in NAD, alert and cooperative  Eyes: PERRL, EOMI, no icterus   ENMT: mucous membranes moist, no apparent injury, no lesions seen  Head/Neck: Neck supple, no apparent injury  Respiratory/Thorax: Lungs CTA bilaterally, non-labored breathing, no cough, on RA  Cardiovascular: Regular, rate and rhythm, no murmurs, normal S1 and S2  Gastrointestinal: Nondistended, soft, non-tender, BS hypoactive x 4  Musculoskeletal: RUE amputation, L aka, Left wrist surgical site edematous with mild Extremities: +4 edema  RLE  Neurological: alert and oriented x 3, speech clear, follows commands appropriately  Skin: Warm and dry/flaky, right fem dialysis line, Pressure injury right heel, pressure injury sacrum,     Current Vital Signs:  Heart Rate: 89 (08/19/24 1200 : Kelvin Marcos RN)  BP: 86/58 (08/19/24 1200 : Kelvin Marcos RN)  Temp: 36 °C (96.8 °F) (08/19/24 1643 : Lolly Nicolas)  Resp: 16 (08/19/24 1200 : Kelvin Marcos RN)  SpO2: 95 % (08/19/24 1200 : Kelvin Marcos RN)    Delvin Gomez, AMARILYS-CNP

## 2024-08-19 NOTE — H&P
"  MEDICINE INPATIENT PROGRESS NOTE      Subjective     Patient feeling well, ammenable to transfer. Very focused on getting back to LTC after this prolonged stay. Denies fever, chills, SOB, CP, or pain.     Review of Systems   Review of Systems as above       Objective     Last Recorded Vitals  Pulse 93, temperature 35.7 °C (96.3 °F), temperature source Temporal.  Intake/Output last 3 Shifts:  No intake/output data recorded.    Physical Exam  General: well appearing, NAD, pleasant  HEENT: NCAT, MMM  CV: RRR, no m/r/g noted on exam  PULM: CTAB, non-labored respirations   ABD: hardened abdomen NT, ND, + bowel sounds   : no suprapubic or CVA tenderness   EXT: R BKA, 4+ pitting edema to knee on left side  SKIN: no rashes noted   NEURO: A&Ox4, no gross motor or sensory deficits  PSYCH: pleasant mood, appropriate affect      Relevant Results    Labs    Results from last 7 days   Lab Units 08/19/24 0446 08/18/24 0439 08/17/24  0428   WBC AUTO x10*3/uL 6.5 6.6 5.3   HEMOGLOBIN g/dL 8.6* 8.7* 8.3*   HEMATOCRIT % 27.8* 27.8* 27.1*   PLATELETS AUTO x10*3/uL 118* 109* 94*            Results from last 7 days   Lab Units 08/19/24 0446 08/18/24 0439 08/17/24  0428   SODIUM mmol/L 138 140 138   POTASSIUM mmol/L 3.7 4.3 3.6   CO2 mmol/L 25 26 27   ANION GAP mmol/L 15 17 14   BUN mg/dL 28* 25* 20   CREATININE mg/dL 5.62* 5.03* 4.13*   GLUCOSE mg/dL 50* 222* 66*   EGFR mL/min/1.73m*2 12* 14* 18*   MAGNESIUM mg/dL 1.98 2.03 2.00   PHOSPHORUS mg/dL 3.7 4.2 3.6            No lab exists for component: \"BILIT\"   Results from last 7 days   Lab Units 08/19/24 0446 08/18/24 0439 08/17/24  0428   INR  4.6* 4.2* 4.2*            No lab exists for component: \"BNPRESU\", \"CPKT\"  Results from last 7 days   Lab Units 08/14/24  1004   LACTATE mmol/L 1.3        Scheduled medications  atorvastatin, 40 mg, oral, Nightly  B complex-vitamin C-folic acid, 1 capsule, oral, Daily  bisacodyl, 10 mg, rectal, Daily  cholecalciferol, 50,000 Units, oral, " Once per day on Monday Thursday  epoetin tyson or biosimilar, 10,000 Units, subcutaneous, Once per day on Monday Wednesday Friday  folic acid, 1 mg, oral, Daily  insulin lispro, 0-15 Units, subcutaneous, Before meals & nightly  levothyroxine, 150 mcg, oral, Daily before breakfast  melatonin, 6 mg, oral, Nightly  midodrine, 20 mg, oral, TID  nystatin, 1 Application, Topical, BID  pantoprazole, 40 mg, oral, BID AC  [START ON 9/6/2024] pantoprazole, 40 mg, oral, Daily before breakfast  polyethylene glycol, 17 g, oral, BID  QUEtiapine, 12.5 mg, oral, Nightly  sennosides-docusate sodium, 1 tablet, oral, BID  thiamine, 200 mg, oral, Daily  vancomycin 5 mg/mL + heparin 2500 units/mL in NS lock, 2 mL, intra-catheter, q48h  [Held by provider] warfarin, 2.5 mg, oral, Daily      Continuous medications     PRN medications  PRN medications: acetaminophen **OR** [DISCONTINUED] acetaminophen **OR** [DISCONTINUED] acetaminophen, alteplase, dextrose, dextrose, diphenhydrAMINE, glucagon, glucagon, HYDROmorphone, ondansetron, oxyCODONE, vancomycin     Imaging  === 01/14/24 ===    US GALLBLADDER    - Impression -  Prominent gallbladder wall thickening measuring 10 mm in thickness  and pericholecystic edema. There is however no definite evidence of  gallstones and per the sonographer, sonographic Muñiz sign is  absent. The gallbladder wall thickening is therefore nonspecific and  may relate to underlying liver disease, hypoproteinemia or cardiac  disease. Clinical correlation is recommended and if there is concern  for acalculous cholecystitis, HIDA scan may be obtained for further  evaluation.    MACRO:  None    Signed by: Houston Conde 1/14/2024 7:32 PM  Dictation workstation:   [id number]  === 07/22/24 ===    CT HEAD WO IV CONTRAST    - Impression -  Minimal cortical volume loss without hydrocephalus, hemorrhage or  extra-axial collection.      Signed by: Nakul Wang 8/6/2024 4:43 PM  Dictation workstation:    [id number]    Susceptibility data from last 90 days.  Collected Specimen Info Organism Clindamycin Erythromycin Oxacillin Rifampin Tetracycline Trimethoprim/Sulfamethoxazole Vancomycin   07/27/24 Fluid from Synovial Methicillin Resistant Staphylococcus aureus (MRSA)  R  R  R   R  S  S   07/27/24 Blood culture from Peripheral Venipuncture Staphylococcus haemolyticus  R  R  R   S  R  S   07/25/24 Blood culture from Peripheral Venipuncture Staphylococcus aureus          07/25/24 Blood culture from Peripheral Venipuncture Staphylococcus aureus          07/22/24 Blood culture from Peripheral Venipuncture Methicillin Resistant Staphylococcus aureus (MRSA)  R  R  R  I  R  S  S   07/22/24 Blood culture from Peripheral Venipuncture Staphylococcus aureus                        Assessment/Plan     PMHx significant for ESRD (MWF via right femoral line), NICM, HFrEF (LVEF 35-40% on 5/2024 POLI), anemia, DM2, Left AKA, RUE amputation, HTN, PVD, GERD transferred from step down after being MICU for sepsis c/b lar. Patient now stable, completed course of ceftaroline (7/23-8/6); per ID will continue with Vanco for 6-8 weeks (end date 9/13/2024). Sepsis c/b femoral catheter tip vegetation in RA involving TV, inducing severe tricuspid regurg. Additionally, c/b osteomyelitis of left wrist requiring I/D on 7/29/24 by Ortho. R consulted on 8/13 to evaluate if right femoral HD catheter can be exchanged for one with a pigtail or if a tunnelled PICC line could be placed next to it. Current Right common femoral vein tunneled dialysis catheter placed in IR on 5/24/2024. Endovascular consulted and they tentatively planning for vegectomy with Dr. Dennis Monday 7/29. Vegectomy aborted due to to concerns for infected thrombus in the IVC and potential incomplete treatment without removal of the TDC. IR not able to exchange current dialysis line as risk too high that they may not be able to reestablish access. Given poor overall prognosis, GOC  discussion was done and patient is now DNR/DNI, patient met and denied hospice on 8/17. Patient undergoes iHD MWF for ESRD, tolerated well day of transfer. Plan moving forward is to finish IV vanc outpatient once plan for LTC administration is in place. Will trial indefinite suppression with Bactrim 1SS daily after vancomycin course per ID. Will need f/up with ID (Dr Doroteo Graham on 9/10).        ###Sepsis 2/2 MRSA  ###Femoral catheter/RA vegetation  ###Left wrist osteomyelitis s/p I/D  :: I/D of left wrist on 7/29/24 by Dr. Chavezr  :: attempted aspiration of left hand by Ortho on 8/14, unsuccessful, no purulence, likely hematoma  :: Will need indefinite bactrim suppression after vanc per ID  :: pressors weaned, continuing on midodrine  :: 7/29 Bcx negative  - c/w vanc 5mg  - organize for dispo: vanc needs to be instilled in femoral catheter after dialysis, make sure [facility] is able and aware  - SW to arrange hospice navigator  - per ortho, suture removal on 8/21  - c/w midodrine 20mg TID  - c/w PRN tylenol, benadryl, zofran  - c/w dilaudid/oxy for breakthrough pain    ###PVD  :: Left AKA  :: RUE amputation  :: Vascular medicine following; continued on heparin drip and transitioned to Coumadin with INR therapeutic x 2 on 8/11 (2.3) and 8/12 (3.0); Heparin discontinued on 8/12.   :: Was placed on coumadin 2.5mg, INR most recently 4.6  - currently holding coumadin, restart lower dose once patient drops to INR 2-3  - c/w atorvastatin 50mg nightly    ###ESRD  - iHD MWF    ##HFrEF (LVEF 35-40% on 5/2024 POLI)  ##NICM  ##Anemia  :: baseline Hgb 7-8  - c/w EPO 10,000 units MWF  - ctm    ##DM2  - SSI    #Constipation  - c/w rectal bisacodyl 10mg daily  -miralax BID  - sennosides-docusate sodium, 1 tablet, oral, BID    #Hypothyroidism  - levo 150mg daily    #Preventative  - B complex-vitamin C-folic acid, 1 capsule, oral, Daily  - cholecalciferol, 50,000 Units, oral, Once per day on Monday Thursday  - folic acid, 1 mg, oral,  Daily  - thiamine, 200 mg, oral, Daily    #Misc  - melatonin 6mg nightly  - QUEtiapine, 12.5 mg, oral, Nightly    F: PRN  E: PRN  N: Regular Diet  A: pIV    DVT ppx: Lovenox, SubQ Heparin, SCDs  GI ppx: protonix    CODE STATUS: DNR and No Intubation         Rocío Chan MD PGY-1

## 2024-08-19 NOTE — PROGRESS NOTES
"Medical Intensive Care - Daily Progress Note   Subjective    Edwar Hemphill is a 39 y.o. year old male patient admitted on 7/22/2024 with septic shock    Interval History:  No acute events overnight.  Plan for iHD at bedside today    Meds    Scheduled medications  atorvastatin, 40 mg, oral, Nightly  B complex-vitamin C-folic acid, 1 capsule, oral, Daily  bisacodyl, 10 mg, rectal, Daily  cholecalciferol, 50,000 Units, oral, Once per day on Monday Thursday  epoetin tyson or biosimilar, 10,000 Units, subcutaneous, Once per day on Monday Wednesday Friday  folic acid, 1 mg, oral, Daily  insulin lispro, 0-15 Units, subcutaneous, Before meals & nightly  levothyroxine, 150 mcg, oral, Daily before breakfast  melatonin, 6 mg, oral, Nightly  midodrine, 20 mg, oral, TID  nystatin, 1 Application, Topical, BID  pantoprazole, 40 mg, oral, BID AC  [START ON 9/6/2024] pantoprazole, 40 mg, oral, Daily before breakfast  polyethylene glycol, 17 g, oral, BID  QUEtiapine, 12.5 mg, oral, Nightly  sennosides-docusate sodium, 1 tablet, oral, BID  thiamine, 200 mg, oral, Daily  vancomycin 5 mg/mL + heparin 2500 units/mL in NS lock, 2 mL, intra-catheter, q48h  [Held by provider] warfarin, 2.5 mg, oral, Daily      Continuous medications     PRN medications  PRN medications: acetaminophen **OR** [DISCONTINUED] acetaminophen **OR** [DISCONTINUED] acetaminophen, alteplase, dextrose, dextrose, diphenhydrAMINE, glucagon, glucagon, HYDROmorphone, ondansetron, oxyCODONE, vancomycin     Objective    Blood pressure 71/57, pulse 81, temperature 36.1 °C (97 °F), resp. rate 15, height 1.753 m (5' 9.02\"), weight 86.6 kg (190 lb 14.7 oz), SpO2 98%.     Physical Exam:  Constitutional: chronically ill appearing pt in NAD, alert and cooperative  Eyes: PERRL, EOMI, no icterus   ENMT: mucous membranes moist, no apparent injury, no lesions seen  Head/Neck: Neck supple, no apparent injury  Respiratory/Thorax: Lungs CTA bilaterally, non-labored breathing, no cough, on " RA  Cardiovascular: Regular, rate and rhythm, no murmurs, normal S1 and S2  Gastrointestinal: Nondistended, soft, non-tender, BS hypoactive x 4  Musculoskeletal: RUE amputation, L aka, Left wrist surgical site edematous with mild Extremities: +4 edema RLE  Neurological: alert and oriented x 3, speech clear, follows commands appropriately  Skin: Warm and dry/flaky, right fem dialysis line, Pressure injury right heel, pressure injury sacrum,          Intake/Output Summary (Last 24 hours) at 8/19/2024 0718  Last data filed at 8/18/2024 2008  Gross per 24 hour   Intake 655 ml   Output 0 ml   Net 655 ml     Labs:   Results from last 72 hours   Lab Units 08/19/24 0446 08/18/24  0439 08/17/24  0428   SODIUM mmol/L 138 140 138   POTASSIUM mmol/L 3.7 4.3 3.6   CHLORIDE mmol/L 102 101 101   CO2 mmol/L 25 26 27   BUN mg/dL 28* 25* 20   CREATININE mg/dL 5.62* 5.03* 4.13*   GLUCOSE mg/dL 50* 222* 66*   CALCIUM mg/dL 8.6 8.6 8.8   ANION GAP mmol/L 15 17 14   EGFR mL/min/1.73m*2 12* 14* 18*   PHOSPHORUS mg/dL 3.7 4.2 3.6      Results from last 72 hours   Lab Units 08/19/24 0446 08/18/24  0439 08/17/24  0428   WBC AUTO x10*3/uL 6.5 6.6 5.3   HEMOGLOBIN g/dL 8.6* 8.7* 8.3*   HEMATOCRIT % 27.8* 27.8* 27.1*   PLATELETS AUTO x10*3/uL 118* 109* 94*        Micro/ID:     Lab Results   Component Value Date    BLOODCULT No growth at 4 days -  FINAL REPORT 07/31/2024    BLOODCULT No growth at 4 days -  FINAL REPORT 07/31/2024       Summary of key imaging results from the last 24 hours  No results found.      Assessment and Plan     Assessment: Edwar Hemphill is a 39 y.o. year old male patient with a hx of ESRD on HD (MWF, right femoral line), HFrEF (EF 35-40% 5/2024), T2DM, PAD s/p L AKA and R above elbow amputation with multiple hospitalizations for L AKA infection and MRSA bacteremia admitted on 7/22/2024 with septic shock    Restraints: no    Summary for 08/19/24  :  iHD today at bedside.  INR remains supratherapeutic, continue to hold  Warfarin  Plan to transfer to [facility].  Plan to discharge back to nursing facility once IV atb coordinated with  ID MD and [facility] Kika Nephrologist.  Dr. Mckeon is medical director at [facility] - [place] office [phone] cell [phone]. [facility] MD needs to speak with nephrology or ID before accepting pt with Vanco.  [facility] will need a physician and fax number for results of titrating     Plan:  NEUROLOGY/PSYCH:  Dx: Acute left wrist pain.  Delirium, confusion (resolved)  Management:  Pain reg: tylenol PRN, oxycodone prn, and Dilaudid prn   Melatonin HS,  Seroquel HS   Palliative care following  Delirium Protocol      CARDIOVASCULAR:  Dx: Infective endocarditis with TV vegetation, Severely attenuated superior vena cava, bilateral brachiocephalic, subclavian and internal jugular veins with the calcifications in the brachiocephalic veins with extensive collaterals in the chest wall likely representing chronic thrombosis.  Septic shock, off pressors since 8/4.  Persistent hypotension (asymptomatic).  Hx of HLD  Goal SBP >80's or MAP >60  Management:  Home Meds: Atorvastatin 40 mg daily, Midodrine 10 mg QID  Patient had vegectomy aborted due to to concerns for infected thrombus in the IVC.   Continue Atorvastatin 40 mg daily  Midodrine 20 mg TID     PULMONARY:  Dx: No acute issues   Management:  Stable on RA     RENAL/GENITOURINARY:  Dx: Hx ESRD on iHD MWF   Management:  iHD today  IR evaluated on 8/14- Risk of losing access and infection risk are too high. As line is currently working, no indication to exchange as it is functioning appropriately and it is his only functioning site of access.     GASTROENTEROLOGY:  Dx: Hx of GERD, Esophagitis, Recent GIB (6/2024) s/p clipping x2. Constipation, resolved.    Mother bringing in food and taking in adequate PO intake  Management:  Diet: Regular and Nepro supplements  Bowel Regimen: Dulcolax suppository daily, Aracelis-colace BID, and Miralax BID   GI recommending BID PPI x1 month then  switch to daily indefinitely. Will need to follow up with Dr. No Kimball as O/p      ENDOCRINOLOGY:  Dx: DM, Hypothyroid.  Euvolemic DKA (resolved) 2/2 poor PO intake.  Hypoglycemia episodes 2/2 poor PO intake  Management:  A1C: 6.8 3(3/2024)  Blood sugars    8/12: TSH 7.48, Free T4 1.01   Continue Holding Glargine 10 U HS  2/2 hypoglycemia  Continue  SSI, accu checks and hypoglycemia protocol   Continue Levothyroxine 150mg daily.     HEMATOLOGY:  Dx: Anema of chronic disease, Thrombocytopenia, Chronic thrombosis of IVC, SVC, blanca brachiocephalic, Subclavian, internal jugular and chronic changes in L CFV & Prox fem veins  MRI head with no evidence of bleeding or mycotic aneurysm   Required 2 U pRBC on 7/24 and 7/29.   No surgical options available as per Vascular Surgery recs from  8/14  Vascular Medicine following, for management of AC  Started on heparin drip 7/31 and now transitioned to Coumadin, Heparin gtt stopped 8/12  Management:  Continue Coumadin 2.5 mg==> Continue to hold (INR 4.6)  Monitor for bleeding given recent GIB  Daily INR monitor, goal INR 2-3     MUSCULOSKELETAL/ SKIN:  Dx: L wrist septic arthritis, s/p I&D 7/29 with ortho  Ortho hand contacted 8/13.  Reevaluated 8/14.  Unsuccessful attempt to aspirate fluid from joint.  As no purulent drainage noted thinking likely hematoma.         Management   Aggressively elevate LUE  Keep ortho hand team notified of anticipated discharge date   8/16, per Ortho, will reassess for remaining suture removal in 7-10 days (likely 8/21)      INFECTIOUS DISEASE:  Dx: MRSA bacteremia, Infective Endocarditis (TV),  L wrist septic arthritis s/p I&D (7/29), septic shock (resolved).  Hx of Acinetobacter  Management:  Tmax: 36.4 C, WBC 6.5  MICRO: 7/22 and 7/25 BC with MRSA, 7/27 BC Staph Haemolyticus; 7/27 synovial fluid from Left wrist MRSA, BC 7/29 and 7/31 Neg, 7/29 Fungal cx (tissue) pending, Bacterial cx neg  Antimicrobials: Continue vancomycin post-HD through  end date 9/13   Will instill vanc into HD cath after each HD session  Will trial indefinite suppression with Bactrim 1SS daily after vancomycin course (ID follow up on 9/10)   Follow up with ID, Dr Doroteo Graham 9/10     ICU Check List       FEN  Fluids: PRN  Electrolytes: PRN  Nutrition: Carb Consistent diet with Nepro supp  Prophylaxis:  DVT ppx: Warfarin   GI ppx: PPI  Bowel care: Bisacodyl suppository daily, Miralax BID, and gabriela-colace BID   Hardware:  Access: Right Femoral hemodialysis line  Drains: None  Lines: Left EJ  Social:  Code: DNR and No Intubation    HPOA: Shanthi Hemphill, Mother, [phone]  Disposition: Plan to transfer to [facility]    AMARILYS Potter-CNP   08/19/24 at 7:18 AM     Pt discussed with Dr. Ferrell, seen and examined. All labs, VS and previous plan of care reviewed.      Disclaimer: Documentation completed with the information available at the time of input. The times in the chart may not be reflective of actual patient care times, interventions, or procedures. Documentation occurs after the physical care of the patient.

## 2024-08-20 ENCOUNTER — DOCUMENTATION (OUTPATIENT)
Dept: TRANSPLANT | Facility: HOSPITAL | Age: 39
End: 2024-08-20
Payer: COMMERCIAL

## 2024-08-20 LAB
ALBUMIN SERPL BCP-MCNC: 2.4 G/DL (ref 3.4–5)
ANION GAP SERPL CALC-SCNC: 13 MMOL/L (ref 10–20)
BUN SERPL-MCNC: 21 MG/DL (ref 6–23)
CALCIUM SERPL-MCNC: 8.3 MG/DL (ref 8.6–10.6)
CHLORIDE SERPL-SCNC: 102 MMOL/L (ref 98–107)
CO2 SERPL-SCNC: 27 MMOL/L (ref 21–32)
CORTIS AM PEAK SERPL-MSCNC: 9.9 UG/DL (ref 5–20)
CREAT SERPL-MCNC: 4.61 MG/DL (ref 0.5–1.3)
EGFRCR SERPLBLD CKD-EPI 2021: 16 ML/MIN/1.73M*2
ERYTHROCYTE [DISTWIDTH] IN BLOOD BY AUTOMATED COUNT: 19.1 % (ref 11.5–14.5)
GLUCOSE BLD MANUAL STRIP-MCNC: 155 MG/DL (ref 74–99)
GLUCOSE BLD MANUAL STRIP-MCNC: 235 MG/DL (ref 74–99)
GLUCOSE BLD MANUAL STRIP-MCNC: 39 MG/DL (ref 74–99)
GLUCOSE BLD MANUAL STRIP-MCNC: 71 MG/DL (ref 74–99)
GLUCOSE BLD MANUAL STRIP-MCNC: 94 MG/DL (ref 74–99)
GLUCOSE BLD MANUAL STRIP-MCNC: 95 MG/DL (ref 74–99)
GLUCOSE SERPL-MCNC: 92 MG/DL (ref 74–99)
HCT VFR BLD AUTO: 25.6 % (ref 41–52)
HGB BLD-MCNC: 8 G/DL (ref 13.5–17.5)
INR PPP: 2.7 (ref 0.9–1.1)
LACTATE SERPL-SCNC: 0.8 MMOL/L (ref 0.4–2)
MAGNESIUM SERPL-MCNC: 1.81 MG/DL (ref 1.6–2.4)
MCH RBC QN AUTO: 28.7 PG (ref 26–34)
MCHC RBC AUTO-ENTMCNC: 31.3 G/DL (ref 32–36)
MCV RBC AUTO: 92 FL (ref 80–100)
NRBC BLD-RTO: 0 /100 WBCS (ref 0–0)
PHOSPHATE SERPL-MCNC: 3.4 MG/DL (ref 2.5–4.9)
PLATELET # BLD AUTO: 97 X10*3/UL (ref 150–450)
POTASSIUM SERPL-SCNC: 4.5 MMOL/L (ref 3.5–5.3)
PROTHROMBIN TIME: 31.1 SECONDS (ref 9.8–12.8)
RBC # BLD AUTO: 2.79 X10*6/UL (ref 4.5–5.9)
SODIUM SERPL-SCNC: 137 MMOL/L (ref 136–145)
WBC # BLD AUTO: 5.7 X10*3/UL (ref 4.4–11.3)

## 2024-08-20 PROCEDURE — 80069 RENAL FUNCTION PANEL: CPT | Performed by: NURSE PRACTITIONER

## 2024-08-20 PROCEDURE — 2500000001 HC RX 250 WO HCPCS SELF ADMINISTERED DRUGS (ALT 637 FOR MEDICARE OP): Performed by: NURSE PRACTITIONER

## 2024-08-20 PROCEDURE — 99233 SBSQ HOSP IP/OBS HIGH 50: CPT | Performed by: NURSE PRACTITIONER

## 2024-08-20 PROCEDURE — 83735 ASSAY OF MAGNESIUM: CPT

## 2024-08-20 PROCEDURE — 2500000002 HC RX 250 W HCPCS SELF ADMINISTERED DRUGS (ALT 637 FOR MEDICARE OP, ALT 636 FOR OP/ED)

## 2024-08-20 PROCEDURE — 2500000001 HC RX 250 WO HCPCS SELF ADMINISTERED DRUGS (ALT 637 FOR MEDICARE OP)

## 2024-08-20 PROCEDURE — 2500000005 HC RX 250 GENERAL PHARMACY W/O HCPCS

## 2024-08-20 PROCEDURE — 99232 SBSQ HOSP IP/OBS MODERATE 35: CPT | Performed by: NURSE PRACTITIONER

## 2024-08-20 PROCEDURE — 99233 SBSQ HOSP IP/OBS HIGH 50: CPT

## 2024-08-20 PROCEDURE — 1200000002 HC GENERAL ROOM WITH TELEMETRY DAILY

## 2024-08-20 PROCEDURE — 85027 COMPLETE CBC AUTOMATED: CPT | Performed by: NURSE PRACTITIONER

## 2024-08-20 PROCEDURE — 85610 PROTHROMBIN TIME: CPT | Performed by: NURSE PRACTITIONER

## 2024-08-20 PROCEDURE — 36415 COLL VENOUS BLD VENIPUNCTURE: CPT | Performed by: NURSE PRACTITIONER

## 2024-08-20 PROCEDURE — 82947 ASSAY GLUCOSE BLOOD QUANT: CPT

## 2024-08-20 PROCEDURE — 36415 COLL VENOUS BLD VENIPUNCTURE: CPT

## 2024-08-20 PROCEDURE — 83605 ASSAY OF LACTIC ACID: CPT

## 2024-08-20 RX ORDER — BISACODYL 10 MG/1
10 SUPPOSITORY RECTAL DAILY PRN
Status: DISCONTINUED | OUTPATIENT
Start: 2024-08-20 | End: 2024-08-22 | Stop reason: HOSPADM

## 2024-08-20 RX ORDER — QUETIAPINE FUMARATE 25 MG/1
12.5 TABLET, FILM COATED ORAL NIGHTLY
Status: CANCELLED
Start: 2024-08-20

## 2024-08-20 RX ORDER — INSULIN LISPRO 100 [IU]/ML
0-5 INJECTION, SOLUTION INTRAVENOUS; SUBCUTANEOUS
Status: DISCONTINUED | OUTPATIENT
Start: 2024-08-20 | End: 2024-08-22 | Stop reason: HOSPADM

## 2024-08-20 RX ORDER — HYDROMORPHONE HYDROCHLORIDE 1 MG/ML
0.1 INJECTION, SOLUTION INTRAMUSCULAR; INTRAVENOUS; SUBCUTANEOUS
Status: CANCELLED
Start: 2024-08-20

## 2024-08-20 RX ORDER — WARFARIN 2 MG/1
2 TABLET ORAL DAILY
Status: DISCONTINUED | OUTPATIENT
Start: 2024-08-20 | End: 2024-08-22

## 2024-08-20 RX ORDER — BISACODYL 10 MG/1
10 SUPPOSITORY RECTAL DAILY
Status: CANCELLED
Start: 2024-08-21

## 2024-08-20 ASSESSMENT — COGNITIVE AND FUNCTIONAL STATUS - GENERAL
TURNING FROM BACK TO SIDE WHILE IN FLAT BAD: TOTAL
CLIMB 3 TO 5 STEPS WITH RAILING: TOTAL
CLIMB 3 TO 5 STEPS WITH RAILING: TOTAL
DRESSING REGULAR LOWER BODY CLOTHING: TOTAL
TOILETING: TOTAL
MOVING TO AND FROM BED TO CHAIR: TOTAL
TOILETING: A LOT
HELP NEEDED FOR BATHING: A LOT
MOBILITY SCORE: 7
MOVING TO AND FROM BED TO CHAIR: TOTAL
DRESSING REGULAR UPPER BODY CLOTHING: A LOT
STANDING UP FROM CHAIR USING ARMS: TOTAL
STANDING UP FROM CHAIR USING ARMS: TOTAL
WALKING IN HOSPITAL ROOM: TOTAL
MOVING FROM LYING ON BACK TO SITTING ON SIDE OF FLAT BED WITH BEDRAILS: TOTAL
EATING MEALS: A LITTLE
MOBILITY SCORE: 6
DAILY ACTIVITIY SCORE: 11
MOVING FROM LYING ON BACK TO SITTING ON SIDE OF FLAT BED WITH BEDRAILS: A LOT
HELP NEEDED FOR BATHING: A LOT
PERSONAL GROOMING: A LOT
WALKING IN HOSPITAL ROOM: TOTAL
TURNING FROM BACK TO SIDE WHILE IN FLAT BAD: TOTAL
DRESSING REGULAR UPPER BODY CLOTHING: A LOT
DRESSING REGULAR LOWER BODY CLOTHING: TOTAL

## 2024-08-20 ASSESSMENT — PAIN SCALES - GENERAL
PAINLEVEL_OUTOF10: 7
PAINLEVEL_OUTOF10: 0 - NO PAIN
PAINLEVEL_OUTOF10: 0 - NO PAIN
PAINLEVEL_OUTOF10: 4

## 2024-08-20 ASSESSMENT — PAIN DESCRIPTION - LOCATION: LOCATION: WRIST

## 2024-08-20 ASSESSMENT — PAIN - FUNCTIONAL ASSESSMENT
PAIN_FUNCTIONAL_ASSESSMENT: 0-10

## 2024-08-20 ASSESSMENT — PAIN DESCRIPTION - ORIENTATION: ORIENTATION: LEFT

## 2024-08-20 NOTE — COMMITTEE REVIEW
Presentation for Listing Evaluation Date: 4/16/2024  Committee Review Date: 8/15/2024    Organ being evaluated for: Kidney    Transplant Phase: Evaluation  Transplant Status: Active    Referring Physician:      Primary Diagnosis: DM II   Secondary Diagnosis: HTN    Committee Review Decision: Declined      Committee Discussion Details:   The candidate's evaluation was presented and discussed at the Kidney  Multidisciplinary Selection Conference. After review of the candidate's diagnosis and the evaluations of the multidisciplinary team members, it was the consensus of the Selection Committee that the candidate does not meet Kidney Selection Criteria and is Declined for Kidney transplant due to his inoperable infected endocarditis.

## 2024-08-20 NOTE — PROGRESS NOTES
"Edwar Hemphill is a 39 y.o. male on day 29 of admission presenting with Septic shock (Multi).    Subjective   Pt resting in bed. Denies sob, n/v/d, fever, cough, chills, chest pains, pain, light head, dizziness, constipation       Objective     Physical Exam  Vitals and nursing note reviewed.   Constitutional:       Appearance: He is obese.   Cardiovascular:      Rate and Rhythm: Normal rate.      Comments: HR-81  Pulmonary:      Comments: Mckinley lung sounds dim  Abdominal:      General: There is distension.      Comments: Non-tender to palpation   Genitourinary:     Comments: States anuric  Musculoskeletal:      Comments: Rt upper arm amp  Lt aka +3 pitting edema  Rt leg +3 pitting edema   Skin:     General: Skin is warm and dry.      Comments: Lt wrist with kerlix wrap   Neurological:      Mental Status: He is alert and oriented to person, place, and time.   Psychiatric:         Mood and Affect: Mood normal.         Behavior: Behavior normal.         Last Recorded Vitals  Blood pressure 79/53, pulse 85, temperature 35.8 °C (96.4 °F), resp. rate 18, height 1.753 m (5' 9.02\"), weight 86.6 kg (190 lb 14.7 oz), SpO2 94%.  Intake/Output last 3 Shifts:  I/O last 3 completed shifts:  In: 1515 (21.4 mL/kg) [P.O.:315; I.V.:400 (5.6 mL/kg); Other:800]  Out: 1402 (19.8 mL/kg) [Other:1402]  Dosing Weight: 70.9 kg     Relevant Results  Results for orders placed or performed during the hospital encounter of 07/22/24 (from the past 24 hour(s))   POCT GLUCOSE   Result Value Ref Range    POCT Glucose 363 (H) 74 - 99 mg/dL   POCT GLUCOSE   Result Value Ref Range    POCT Glucose 304 (H) 74 - 99 mg/dL   POCT GLUCOSE   Result Value Ref Range    POCT Glucose 182 (H) 74 - 99 mg/dL   POCT GLUCOSE   Result Value Ref Range    POCT Glucose 39 (L) 74 - 99 mg/dL   POCT GLUCOSE   Result Value Ref Range    POCT Glucose 95 74 - 99 mg/dL   Magnesium   Result Value Ref Range    Magnesium 1.81 1.60 - 2.40 mg/dL   Renal function panel   Result Value Ref " Range    Glucose 92 74 - 99 mg/dL    Sodium 137 136 - 145 mmol/L    Potassium 4.5 3.5 - 5.3 mmol/L    Chloride 102 98 - 107 mmol/L    Bicarbonate 27 21 - 32 mmol/L    Anion Gap 13 10 - 20 mmol/L    Urea Nitrogen 21 6 - 23 mg/dL    Creatinine 4.61 (H) 0.50 - 1.30 mg/dL    eGFR 16 (L) >60 mL/min/1.73m*2    Calcium 8.3 (L) 8.6 - 10.6 mg/dL    Phosphorus 3.4 2.5 - 4.9 mg/dL    Albumin 2.4 (L) 3.4 - 5.0 g/dL   CBC   Result Value Ref Range    WBC 5.7 4.4 - 11.3 x10*3/uL    nRBC 0.0 0.0 - 0.0 /100 WBCs    RBC 2.79 (L) 4.50 - 5.90 x10*6/uL    Hemoglobin 8.0 (L) 13.5 - 17.5 g/dL    Hematocrit 25.6 (L) 41.0 - 52.0 %    MCV 92 80 - 100 fL    MCH 28.7 26.0 - 34.0 pg    MCHC 31.3 (L) 32.0 - 36.0 g/dL    RDW 19.1 (H) 11.5 - 14.5 %    Platelets 97 (L) 150 - 450 x10*3/uL   Protime-INR   Result Value Ref Range    Protime 31.1 (H) 9.8 - 12.8 seconds    INR 2.7 (H) 0.9 - 1.1   POCT GLUCOSE   Result Value Ref Range    POCT Glucose 94 74 - 99 mg/dL   Lactate   Result Value Ref Range    Lactate 0.8 0.4 - 2.0 mmol/L   POCT GLUCOSE   Result Value Ref Range    POCT Glucose 71 (L) 74 - 99 mg/dL   POCT GLUCOSE   Result Value Ref Range    POCT Glucose 155 (H) 74 - 99 mg/dL          Scheduled medications  atorvastatin, 40 mg, oral, Nightly  B complex-vitamin C-folic acid, 1 capsule, oral, Daily  bisacodyl, 10 mg, rectal, Daily  cholecalciferol, 50,000 Units, oral, Once per day on Monday Thursday  [START ON 8/21/2024] epoetin tyson or biosimilar, 12,000 Units, intravenous, Once per day on Monday Wednesday Friday  folic acid, 1 mg, oral, Daily  insulin lispro, 0-5 Units, subcutaneous, TID  levothyroxine, 150 mcg, oral, Daily before breakfast  melatonin, 6 mg, oral, Nightly  midodrine, 20 mg, oral, TID  nystatin, 1 Application, Topical, BID  pantoprazole, 40 mg, oral, BID AC  [START ON 9/6/2024] pantoprazole, 40 mg, oral, Daily before breakfast  polyethylene glycol, 17 g, oral, BID  QUEtiapine, 12.5 mg, oral, Nightly  sennosides-docusate sodium, 1  tablet, oral, BID  thiamine, 200 mg, oral, Daily  vancomycin 5 mg/mL + heparin 2500 units/mL in NS lock, 2 mL, intra-catheter, q48h  warfarin, 2 mg, oral, Daily      Continuous medications     PRN medications  PRN medications: acetaminophen **OR** [DISCONTINUED] acetaminophen **OR** [DISCONTINUED] acetaminophen, alteplase, dextrose, dextrose, diphenhydrAMINE, glucagon, glucagon, HYDROmorphone, ondansetron, oxyCODONE, vancomycin                    Assessment/Plan   Assessment & Plan  Septic shock (Multi)    Acute infective endocarditis (HHS-HCC)    Tricuspid valve vegetation (HHS-HCC)    Staphylococcal arthritis of left wrist (Multi)    Infection of left wrist (Multi)    Tolerated hemodialysis yesterday  with net fluid loss 0.5L    Hypotension with tx (79/58-94/71) hypervolemic on exam and has stable electrolytes . K+=4.5     Outpatient Dialysis schedule:  [facility] [place]/Dr L[place]a      Access: rt fem graft- no issues - able to achieve      Anemia of ESRD:  epoetin tyson (Epogen,Procrit) injection 12,000 Units on dialysis days.. current hgb 8.0.. will cont to monitor      CKD-MBD Phosphate Binder:B complex-vitamin C-folic acid (Nephrocaps) capsule 1 capsule daily, cholecalciferol (Vitamin D-3) capsule 50,000 Units M/R     Plan HD tomorrow with UF as tolerated     Renal diet      Please obtain daily standing wt (if possible)     Medication to be adjusted for ESRD      Patient to continue regular HD schedule while inpatient and to follow with the outpatient nephrologist at discharge         Awaiting placement    AMARILYS Alexandre-CNP

## 2024-08-20 NOTE — PROGRESS NOTES
"Nutrition Follow Up Assessment:   Nutrition Assessment    Reason for Assessment: Dietitian discretion (follow up)    Patient is a 39 y.o. male presenting on admission day 29.     Nutrition History:  Food and Nutrient History: Met with pt this afternoon at bedside. Pt was eating sunflower seeds and chocolate buckeyes at time of RD visit. Pt also had candy on his bedside table. He says he has a good appetite and is eating well. He is not drinking the Nepro or Ensure Clear that is being sent and would like them discontinued. Per flowsheet, pt is consuming 100% of most all meals.      Anthropometrics:  Height: 175.3 cm (5' 9.02\")   Weight: 86.6 kg (190 lb 14.7 oz)   BMI (Calculated): 28.18  IBW/kg (Dietitian Calculated): 72.7 kg (69.1kg adjusted for amputation)  Percent of IBW: 100 %       Weight History:   Date/Time Weight   08/17/24 0619 86.6 kg (190 lb 14.7 oz)   08/16/24 0600 71.1 kg (156 lb 12 oz)   08/15/24 0553 72.1 kg (158 lb 15.2 oz)   08/14/24 0600 72.1 kg (158 lb 15.2 oz)   08/13/24 0555 72.6 kg (160 lb 0.9 oz)   08/12/24 0600 72.8 kg (160 lb 7.9 oz)   08/09/24 0600 72.9 kg (160 lb 11.5 oz)   08/07/24 0400 82.4 kg (181 lb 10.5 oz)     Weight Change %:  Weight History / % Weight Change: wt flux 2/2 fluid    Nutrition Focused Physical Exam Findings:  Subcutaneous Fat Loss:   Orbital Fat Pads: Well nourished (slightly bulging fat pads)  Buccal Fat Pads: Well nourished (full, rounded cheeks)  Muscle Wasting:  Temporalis: Well nourished (well-defined muscle)  Pectoralis (Clavicular Region): Well nourished (clavicle not visible)  Deltoid/Trapezius: Well nourished (rounded appearance at arm, shoulder, neck)  Edema:  Edema: +2 mild  Edema Location: generalized  Physical Findings:  Skin: Positive (MASD to coccyx and left leg, incision to arm)    Nutrition Significant Labs:  CBC Trend:   Results from last 7 days   Lab Units 08/20/24  0536 08/19/24  0446 08/18/24  0439 08/17/24  0428   WBC AUTO x10*3/uL 5.7 6.5 6.6 5.3 "   RBC AUTO x10*6/uL 2.79* 2.93* 2.95* 2.85*   HEMOGLOBIN g/dL 8.0* 8.6* 8.7* 8.3*   HEMATOCRIT % 25.6* 27.8* 27.8* 27.1*   MCV fL 92 95 94 95   PLATELETS AUTO x10*3/uL 97* 118* 109* 94*    , BMP Trend:   Results from last 7 days   Lab Units 08/20/24 0536 08/19/24 0446 08/18/24 0439 08/17/24  0428   GLUCOSE mg/dL 92 50* 222* 66*   CALCIUM mg/dL 8.3* 8.6 8.6 8.8   SODIUM mmol/L 137 138 140 138   POTASSIUM mmol/L 4.5 3.7 4.3 3.6   CO2 mmol/L 27 25 26 27   CHLORIDE mmol/L 102 102 101 101   BUN mg/dL 21 28* 25* 20   CREATININE mg/dL 4.61* 5.62* 5.03* 4.13*    , Renal Lab Trend:   Results from last 7 days   Lab Units 08/20/24 0536 08/19/24 0446 08/18/24 0439 08/17/24  0428   POTASSIUM mmol/L 4.5 3.7 4.3 3.6   PHOSPHORUS mg/dL 3.4 3.7 4.2 3.6   SODIUM mmol/L 137 138 140 138   MAGNESIUM mg/dL 1.81 1.98 2.03 2.00   EGFR mL/min/1.73m*2 16* 12* 14* 18*   BUN mg/dL 21 28* 25* 20   CREATININE mg/dL 4.61* 5.62* 5.03* 4.13*    , Vit D:   Lab Results   Component Value Date    VITD25 79 11/11/2022    , Vit B12:   Lab Results   Component Value Date    SEHWFUYB20 1,027 (H) 08/06/2024        Nutrition Specific Medications:  Scheduled medications  atorvastatin, 40 mg, oral, Nightly  B complex-vitamin C-folic acid, 1 capsule, oral, Daily  bisacodyl, 10 mg, rectal, Daily  cholecalciferol, 50,000 Units, oral, Once per day on Monday Thursday  [START ON 8/21/2024] epoetin tyson or biosimilar, 12,000 Units, intravenous, Once per day on Monday Wednesday Friday  folic acid, 1 mg, oral, Daily  insulin lispro, 0-5 Units, subcutaneous, TID  levothyroxine, 150 mcg, oral, Daily before breakfast  melatonin, 6 mg, oral, Nightly  midodrine, 20 mg, oral, TID  nystatin, 1 Application, Topical, BID  pantoprazole, 40 mg, oral, BID AC  [START ON 9/6/2024] pantoprazole, 40 mg, oral, Daily before breakfast  polyethylene glycol, 17 g, oral, BID  QUEtiapine, 12.5 mg, oral, Nightly  sennosides-docusate sodium, 1 tablet, oral, BID  thiamine, 200 mg, oral,  Daily  vancomycin 5 mg/mL + heparin 2500 units/mL in NS lock, 2 mL, intra-catheter, q48h  warfarin, 2 mg, oral, Daily      Continuous medications     PRN medications  PRN medications: acetaminophen **OR** [DISCONTINUED] acetaminophen **OR** [DISCONTINUED] acetaminophen, alteplase, dextrose, dextrose, diphenhydrAMINE, glucagon, glucagon, HYDROmorphone, ondansetron, oxyCODONE, vancomycin      I/O:   Last BM Date: 08/20/24; Stool Appearance: Soft (08/20/24 0120)    Dietary Orders (From admission, onward)       Start     Ordered    08/15/24 1559  Adult diet Consistent Carb; CCD 75 gm/meal  Diet effective now        Question Answer Comment   Diet type Consistent Carb    Carb diet selection: CCD 75 gm/meal        08/15/24 1558    08/12/24 1409  Oral nutritional supplements  Until discontinued        Question Answer Comment   Deliver with Lunch    Select supplement: Ensure Clear        08/12/24 1408    08/12/24 1408  Oral nutritional supplements  Until discontinued        Question Answer Comment   Deliver with Breakfast    Deliver with Dinner    Select supplement: Nepro        08/12/24 1407                     Estimated Needs:   Total Energy Estimated Needs (kCal):  (5507-7580)  Method for Estimating Needs: MSJ= 1558  Total Protein Estimated Needs (g):  (90+)  Method for Estimating Needs: 1.3 x 69kg    Nutrition Diagnosis        Nutrition Diagnosis  Diagnosis Status (1): Resolved  Nutrition Diagnosis 1: Inadequate oral intake  Related to (1): prolonged hospital course  As Evidenced by (1): inadequate PO intake of hospital food with need for supplements       Nutrition Interventions/Recommendations         Nutrition Prescription:  Individualized Nutrition Prescription Provided for : Continue diet as tolerated  Discontinue CCD diet d/t hypoglycemic episodes- pt eating candy despite hospital provided carb controlled diet  Add 2-3gNa restriction d/t fluid accumulation         Nutrition Interventions:   Interventions: Meals and  snacks  Meals and Snacks: General healthful diet  Goal: intake meets > 75% est energy needs    Nutrition Monitoring and Evaluation   Food/Nutrient Related History Monitoring  Monitoring and Evaluation Plan: Energy intake  Energy Intake: Estimated energy intake  Criteria: Meet > 75% est energy needs         Biochemical Data, Medical Tests and Procedures  Monitoring and Evaluation Plan: Electrolyte/renal panel, Glucose/endocrine profile  Criteria: labs WNL              Time Spent (min): 30 minutes

## 2024-08-20 NOTE — PROGRESS NOTES
Subjective     Interval events:  Patient seen at bedside this AM. He expressed wanting to leave the hospital as he has been here for a month and would like to go back to [place]. He has no other acute complaints. States he is eating and drinking well, appetite is at his baseline. He is having regular bowel movements and no concern for diarrhea. He denies any fevers, chills, nausea/vomitting, chest pain, shortness of breath, abdominal pain, dizziness, or lightheadedness.    Objective     Vitals:  Vitals:    08/20/24 0257   BP: 79/52   Pulse:    Resp:    Temp:    SpO2:        I/O last 3 completed shifts:  In: 1515 (21.4 mL/kg) [P.O.:315; I.V.:400 (5.6 mL/kg); Other:800]  Out: 1402 (19.8 mL/kg) [Other:1402]  Dosing Weight: 70.9 kg   No intake/output data recorded.    Physical exam:  Constitutional: well appearing pt in NAD, alert and cooperative  Eyes: PERRL, EOMI, no icterus   ENMT: mucous membranes moist, no apparent injury, no lesions seen  Head/Neck: Neck supple, no apparent injury  Respiratory/Thorax: Lungs CTA bilaterally, non-labored breathing, no cough, on RA  Cardiovascular: Regular, rate and rhythm, no murmurs, normal S1 and S2  Gastrointestinal: Nondistended, soft, non-tender, BS hypoactive x 4  Musculoskeletal: RUE amputation, L aka, Left wrist surgical site wrapped  Extremities: +3 edema RLE  Neurological: alert and oriented x 3, speech clear, follows commands appropriately  Skin: Warm and dry/flaky, right fem dialysis line    Medications:  atorvastatin, 40 mg, oral, Nightly  B complex-vitamin C-folic acid, 1 capsule, oral, Daily  bisacodyl, 10 mg, rectal, Daily  cholecalciferol, 50,000 Units, oral, Once per day on Monday Thursday  epoetin tyson or biosimilar, 10,000 Units, subcutaneous, Once per day on Monday Wednesday Friday  folic acid, 1 mg, oral, Daily  insulin lispro, 0-5 Units, subcutaneous, TID  levothyroxine, 150 mcg, oral, Daily before breakfast  melatonin, 6 mg, oral, Nightly  midodrine, 20 mg,  oral, TID  nystatin, 1 Application, Topical, BID  pantoprazole, 40 mg, oral, BID AC  [START ON 9/6/2024] pantoprazole, 40 mg, oral, Daily before breakfast  polyethylene glycol, 17 g, oral, BID  QUEtiapine, 12.5 mg, oral, Nightly  sennosides-docusate sodium, 1 tablet, oral, BID  thiamine, 200 mg, oral, Daily  vancomycin 5 mg/mL + heparin 2500 units/mL in NS lock, 2 mL, intra-catheter, q48h  [Held by provider] warfarin, 2.5 mg, oral, Daily         PRN medications: acetaminophen **OR** [DISCONTINUED] acetaminophen **OR** [DISCONTINUED] acetaminophen, alteplase, dextrose, dextrose, diphenhydrAMINE, glucagon, glucagon, HYDROmorphone, ondansetron, oxyCODONE, vancomycin    Labs:  Results for orders placed or performed during the hospital encounter of 07/22/24 (from the past 24 hour(s))   POCT GLUCOSE   Result Value Ref Range    POCT Glucose 126 (H) 74 - 99 mg/dL   POCT GLUCOSE   Result Value Ref Range    POCT Glucose 363 (H) 74 - 99 mg/dL   POCT GLUCOSE   Result Value Ref Range    POCT Glucose 304 (H) 74 - 99 mg/dL   POCT GLUCOSE   Result Value Ref Range    POCT Glucose 182 (H) 74 - 99 mg/dL   POCT GLUCOSE   Result Value Ref Range    POCT Glucose 39 (L) 74 - 99 mg/dL   POCT GLUCOSE   Result Value Ref Range    POCT Glucose 95 74 - 99 mg/dL   Magnesium   Result Value Ref Range    Magnesium 1.81 1.60 - 2.40 mg/dL   Renal function panel   Result Value Ref Range    Glucose 92 74 - 99 mg/dL    Sodium 137 136 - 145 mmol/L    Potassium 4.5 3.5 - 5.3 mmol/L    Chloride 102 98 - 107 mmol/L    Bicarbonate 27 21 - 32 mmol/L    Anion Gap 13 10 - 20 mmol/L    Urea Nitrogen 21 6 - 23 mg/dL    Creatinine 4.61 (H) 0.50 - 1.30 mg/dL    eGFR 16 (L) >60 mL/min/1.73m*2    Calcium 8.3 (L) 8.6 - 10.6 mg/dL    Phosphorus 3.4 2.5 - 4.9 mg/dL    Albumin 2.4 (L) 3.4 - 5.0 g/dL   CBC   Result Value Ref Range    WBC 5.7 4.4 - 11.3 x10*3/uL    nRBC 0.0 0.0 - 0.0 /100 WBCs    RBC 2.79 (L) 4.50 - 5.90 x10*6/uL    Hemoglobin 8.0 (L) 13.5 - 17.5 g/dL     Hematocrit 25.6 (L) 41.0 - 52.0 %    MCV 92 80 - 100 fL    MCH 28.7 26.0 - 34.0 pg    MCHC 31.3 (L) 32.0 - 36.0 g/dL    RDW 19.1 (H) 11.5 - 14.5 %    Platelets 97 (L) 150 - 450 x10*3/uL   Protime-INR   Result Value Ref Range    Protime 31.1 (H) 9.8 - 12.8 seconds    INR 2.7 (H) 0.9 - 1.1   POCT GLUCOSE   Result Value Ref Range    POCT Glucose 94 74 - 99 mg/dL       Imaging:  Electrocardiogram, 12-lead PRN ACS symptoms    Result Date: 8/17/2024  Normal sinus rhythm Low voltage QRS Nonspecific T wave abnormality Abnormal ECG When compared with ECG of 14-AUG-2024 11:07, (unconfirmed) No significant change was found Confirmed by Mario Carroll ([id number]) on 8/17/2024 11:42:10 AM    MR brain wo IV contrast    Result Date: 8/12/2024  Interpreted By:  Gerardo Calle, STUDY: MR BRAIN WO IV CONTRAST;  8/11/2024 2:55 pm   INDICATION: Signs/Symptoms:rule out mycotic aneurysm.   COMPARISON: 08/06/2024   ACCESSION NUMBER(S): [id number]   ORDERING CLINICIAN: KYM COSTA   TECHNIQUE: Axial diffusion, axial T2, axial FLAIR, axial gradient echo T2, coronal T1, and sagittal T1 weighted MRI images of the brain were obtained without intravenous contrast administration.   FINDINGS: The diffusion weighted images fail to demonstrate evidence of abnormal diffusion restriction to suggest acute infarction.   There is again evidence of mild-to-moderate brain parenchymal volume loss.   Mild nonspecific white matter changes are noted within cerebral hemispheres bilaterally. While nonspecific, white matter changes can be seen with small-vessel ischemic change or demyelinating processes among others.   There is no midline shift. The suprasellar/basilar cisterns are patent.   There are small retention cysts or polyps noted within the inferior maxillary sinuses bilaterally.   There is opacification of a few right mastoid air cells.       There is no MRI evidence of acute infarction on the diffusion weighted images.   There is again evidence of  mild-to-moderate brain parenchymal volume loss.   Mild nonspecific white matter changes are noted within cerebral hemispheres bilaterally. While nonspecific, white matter changes can be seen with small-vessel ischemic change or demyelinating processes among others.   MACRO: None.   Signed by: Gerardo Calle 8/12/2024 6:45 AM Dictation workstation:   [id number]    Electrocardiogram, 12-lead PRN ACS symptoms    Result Date: 8/8/2024  Normal sinus rhythm Low voltage QRS Borderline ECG When compared with ECG of 23-JUL-2024 10:15, Borderline criteria for Anterior infarct are no longer Present ST no longer depressed in Anterior leads QT has shortened Confirmed by Mario Carroll ([id number]) on 8/8/2024 9:13:53 PM    XR abdomen 1 view    Result Date: 8/7/2024  Interpreted By:  Koffi Stroud, STUDY: XR ABDOMEN 1 VIEW; 8/7/2024 9:12 am   INDICATION: Signs/Symptoms:Vomiting.   COMPARISON: 01/17/2024.   ACCESSION NUMBER(S): [id number]   ORDERING CLINICIAN: UMAIR VALDEZ   FINDINGS: There is a right femoral venous catheter with tip overlying the right atrium. Moderate amount of stool in nondistended loops of bowel. Correlate with a component of fecal impaction. Limited evaluation of pneumoperitoneum on supine imaging, however no gross evidence of free air is noted.   Left-sided pleural effusion. There is extensive vascular calcifications.   Osseous structures demonstrate no acute bony changes.       1.  No gross bowel distention. Moderate amount of stool within the colon and correlate with a component of fecal impaction.   Signed by: Koffi Stroud 8/7/2024 10:42 AM Dictation workstation:   [id number]    CT head wo IV contrast    Result Date: 8/6/2024  Interpreted By:  Nakul Wang, STUDY: CT HEAD WO IV CONTRAST;  8/6/2024 4:32 pm   INDICATION: Signs/Symptoms:c/f emboli iso large PFO.   COMPARISON: None.   ACCESSION NUMBER(S): [id number]   ORDERING CLINICIAN: ANGELIKA SELLERS   TECHNIQUE: Noncontrast enhanced CT was  performed from the skullbase to the vertex.   FINDINGS: There is minimal prominence of the cortical sulci and sylvian fissures without hydrocephalus. There is no evidence of intracranial mass or extra-axial collection. The skull base, paranasal sinuses and orbital structures are normal. The calvarium is normal.       Minimal cortical volume loss without hydrocephalus, hemorrhage or extra-axial collection.   MACRO: none   Signed by: Nakul Wang 8/6/2024 4:43 PM Dictation workstation:   [id number]    Transthoracic Echo (TTE) Limited    Result Date: 8/2/2024   [facility], [address] [place] [address]                Tel [phone] and Fax [phone] TRANSTHORACIC ECHOCARDIOGRAM REPORT  Patient Name:      XIOMARA Maya Physician:    [id number] Zayda Richmond MD Study Date:        8/2/2024             Ordering Provider:    [id number] MARK MALLOY MRN/PID:           [id number]             Fellow: Accession#:        [id number]         Nurse: Date of Birth/Age: 1985 / 39 years Sonographer:          Reji Bhakta RDCS Gender:            M                    Additional Staff: Height:            175.26 cm            Admit Date: Weight:            75.30 kg             Admission Status:     Inpatient -                                                               Routine BSA / BMI:         1.91 m2 / 24.51      Encounter#:           [id number]                    kg/m2 Blood Pressure:    103/66 mmHg          [address] Study Type:    TRANSTHORACIC ECHO (TTE) LIMITED Diagnosis/ICD: Acute and subacute infective endocarditis-I33.0 Indication:    hypotension, R heart strain CPT Code:      Echo Limited-70364; Doppler Limited-98524; Color Doppler-98308 Patient History:  Pertinent History: ESRD on HD (right femoral line), HFrEF T2DM, PVD (s/p left                    AKA and right forearm amputation), multiple line-related DVT                    (RIJ, LIJ, RUE, LUE, RLE, LLE,TV infective endocarditis. Study Detail: The following Echo studies were performed: 2D, M-Mode, Doppler and               color flow. Technically challenging study due to body habitus,               patient lying in supine position, poor acoustic windows and               prominent lung artifact. Definity used as a contrast agent for               endocardial border definition and agitated saline used as a               contrast agent for intraseptal flow evaluation. Total contrast               used for this procedure was 2.0 mL via IV push.  PHYSICIAN INTERPRETATION: Left Ventricle: Left ventricular ejection fraction is mildly decreased, calculated by Briseno's biplane at 43%. There are no regional wall motion abnormalities. The left ventricular cavity size is normal. The interventricular septum is flattened in diastole ('D' shaped left ventricle), consistent with right ventricular volume overload. Left ventricular diastolic filling was not assessed. Left Atrium: The left atrium is normal in size. A bubble study using agitated saline was performed. Bubble study is positive. A large PFO (> 20 bubbles) was demonstrated. Right Ventricle: The right ventricle is moderately enlarged. There is normal right ventricular global systolic function. Right Atrium: The right atrium is moderately dilated. A large mass is seen the RA (2.4x2.6cm) seems like attached to a catheder (?) projecting from IVC/SVC (difficult to differentiate). A smilar mass was also noted in the POLI from 7/24/2024. Aortic Valve: The aortic valve appears structurally normal. There is mild aortic valve cusp calcification. There is trivial aortic valve regurgitation. Mitral Valve: The mitral valve is mildly thickened. There is mild to moderate mitral  annular calcification. There is trace mitral valve regurgitation. Tricuspid Valve: The tricuspid valve is structurally normal. There is severe tricuspid regurgitation. The Doppler estimated RVSP is moderately elevated at 46.4 mmHg. RVSP is underestimated in the presence of severe TR and a triangular jet. Pulmonic Valve: The pulmonic valve is structurally normal. There is physiologic pulmonic valve regurgitation. Pericardium: There is a trivial pericardial effusion. Aorta: The aortic root is normal. Systemic Veins: The inferior vena cava appears to be of normal size. There is IVC inspiratory collapse greater than 50%. The hepatic vein shows a pattern of systolic flow reversal, suggestive of severe tricuspid regurgitation. In comparison to the previous echocardiogram(s): Compared with study dated 7/24/2024, No significant change.  CONCLUSIONS:  1. Left ventricular ejection fraction is mildly decreased, calculated by Briseno's biplane at 43%.  2. Right ventricular volume overload.  3. Moderately enlarged right ventricle.  4. There is normal right ventricular global systolic function.  5. A large mass is seen the RA (2.4x2.6cm) seems like attached to a catheder (?) projecting from IVC/SVC (difficult to differentiate). A smilar mass was also noted in the POLI from 7/24/2024.  6. The right atrium is moderately dilated.  7. Moderately elevated right ventricular systolic pressure.  8. RVSP is underestimated in the presence of severe TR and a triangular jet.  9. Severe tricuspid regurgitation visualized. 10. A bubble study using agitated saline was performed. Bubble study is positive. A large PFO (> 20 bubbles) was demonstrated. 11. Compared with study dated 7/24/2024, No significant change. QUANTITATIVE DATA SUMMARY: 2D MEASUREMENTS:                          Normal Ranges: IVSd:          0.90 cm   (0.6-1.1cm) LVPWd:         0.80 cm   (0.6-1.1cm) LVIDd:         4.10 cm   (3.9-5.9cm) LVIDs:         2.90 cm LV Mass Index: 55.3  g/m2 LV % FS        29.3 % RA VOLUME BY A/L METHOD:                       Normal Ranges: RA Area A4C: 18.0 cm2 LV SYSTOLIC FUNCTION BY 2D PLANIMETRY (MOD):                      Normal Ranges: EF-A4C View:    38 % (>=55%) EF-A2C View:    46 % EF-Biplane:     43 % LV EF Reported: 43 % AORTIC VALVE:                         Normal Ranges: LVOT Max Nilesh:  0.80 m/s (<=1.1m/s) LVOT VTI:      13.30 cm LVOT Diameter: 1.80 cm  (1.8-2.4cm)  RIGHT VENTRICLE: RV Basal 4.90 cm TAPSE:   16.1 mm RV s'    0.09 m/s TRICUSPID VALVE/RVSP:                             Normal Ranges: Peak TR Velocity: 2.57 m/s RV Syst Pressure: 46.4 mmHg (< 30mmHg)  [id number] Zayda Richmond MD Electronically signed on 8/2/2024 at 11:40:55 AM  ** Final **     XR chest 1 view    Result Date: 7/31/2024  Interpreted By:  Eugene Romero and Ritchie Brandon STUDY: XR CHEST 1 VIEW;  7/31/2024 12:01 am   INDICATION: Signs/Symptoms:pulmonary edema.   COMPARISON: Chest x-ray 07/22/2024.   ACCESSION NUMBER(S): [id number]   ORDERING CLINICIAN: MARK MALLOY   FINDINGS: AP radiograph of the chest was provided.   CARDIOMEDIASTINAL SILHOUETTE: Cardiomediastinal silhouette is normal in size and configuration.   LUNGS: Mildly increased perihilar/interstitial markings compared to prior examination and hazy bibasilar airspace opacities. Unchanged linear atelectasis of the left lung base. There is blunting of the bilateral costophrenic angles consistent with small pleural effusions. No evidence of pneumothorax.   ABDOMEN: No remarkable upper abdominal findings.   BONES: No acute osseous changes.       1.  Mildly increased perihilar/interstitial lung markings with hazy bibasilar airspace opacities. Recommend clinical correlation as findings could reflect developing interstitial/pulmonary edema. Underlying infectious process can not definitively be excluded. 2. Persistent small bilateral pleural effusions and unchanged linear atelectasis of the left lung base.   I personally  reviewed the images/study and I agree with the findings as stated by resident Neftaly Harkins. This study was interpreted at [facility], [place], [place].   MACRO: None   Signed by: Eugene Romero 7/31/2024 8:44 AM Dictation workstation:   [id number]    Cardiac Catheterization Procedure    Result Date: 7/30/2024   [facility] Cath Lab, [address] [place] [address] Cardiovascular Catheterization Report Patient Name:     XIOMARA FERGUSON        Performing Physician:  [id number] Dennis MD Study Date:       7/29/2024           Verifying Physician:   [id number] Dennis MD MRN/PID:          [id number]            Cardiologist/Co-scrub: Accession#:       [id number]        Ordering Physician:    [id number] CASTILLO Date of           1985 / 39      Fellow:                [id number] Castillo Birth/Age:        years                                      MD Gender:           M                   Fellow: Encounter#:       [id number]  Study: IVC venogram  Procedure Description: After infiltration of local anesthetic, the left femoral vein was identified with two dimensional ultrasound. Under direct ultrasound visualization, the left femoral vein was cannulated with a micropuncture technique. A 8 F sheath was placed in the vein. Post-procedure, the venous sheath was pulled and pressure was applied to the site. Following routine access via the left CFV, we had difficulty advancing the wholey wire into the high IVC, therefore we obtained an angiogram of the IVC via the left CFV sheath. There was severe narrowing of the intrahepatic IVC, possibly full with infected thrombus. Procedure was aborted.  Hemo Personnel: +----------------+---------+ Name            Duty       +----------------+---------+ Grant Dennis MD 1 +----------------+---------+  Cardiac Cath Post Procedure Notes: Post Procedure Diagnosis: See below. Blood Loss:               Estimated blood loss during the procedure was 5 mls. Specimens Removed:        Number of specimen(s) removed: none. ____________________________________________________________________________________ CONCLUSIONS:  1. Patient has right groin tunneled dialysis catheter [TDC] extending into the IVC_RA junction with near occlusion of his intrahepatic IVC.  2. Planned procedure of vegetation removal from the tip of the TDC was aborted.  3. We could have possibly performed the vegetectomy via the right internal jugular approach; however, I am concerned that the intrahepatic IVC occlusion represents an infected thrombus, and therefore removing the vegetation from the tip of the TDC by itself would be incomplete treatment and may not help with clearing his MRSA bacteremia. Furthermore, pursuing an invasive treatment of the above would likely necessitate the removal of the TDC and patient has very limited options for alternative sites. Additionally, anticoagulation for such procedure would be challenging as patient has thrombocytopenia and recent GI bleed.  4. Plan to review the case and discuss in details with Dr. Hunter. ICD 10 Codes: Acute and subacute infective endocarditis-I33.0  CPT Codes: Moderate Sedation Services 1st additional 15 minutes patient >5 years-05004; Ultrasound guidance for vascular access-25559; Venogram, IVC-10501  [id number] Grant Dennis MD Performing Physician Electronically signed by [id number] Grant Dennis MD on 7/30/2024 at 8:47:05 AM  ** Final **     XR wrist left 1-2 views    Result Date: 7/29/2024  Interpreted By:  Zach Ordonez  and Janki Ogden STUDY: XR WRIST LEFT 1-2 VIEWS; ;  7/29/2024 10:57 am   INDICATION: Signs/Symptoms:pre-aspiration.   COMPARISON: None.   ACCESSION NUMBER(S): [id number]   ORDERING  CLINICIAN: MARK MALLOY   FINDINGS: 2 views of left wrist No acute fracture or malalignment. Severe vascular calcifications visualized. Osteoporotic changes of the radius and ulna. stable mild soft tissue swelling. Vascular calcifications present as well       No osseous abnormality seen. Soft tissue edema.     I personally reviewed the images/study and I agree with the findings as stated by Alin Shearer MD, PGY-2 this study was interpreted at [place], [place].   MACRO: None   Signed by: Zach Ordonez 7/29/2024 1:00 PM Dictation workstation:   [id number]    XR wrist left 1-2 views    Result Date: 7/28/2024  Interpreted By:  Yeni Mantilla,  and Analia Hoff STUDY: Left wrist, three views   INDICATION: Signs/Symptoms:suspected septic arthritis, planning joint aspiration.   COMPARISON: None.   ACCESSION NUMBER(S): [id number]   ORDERING CLINICIAN: MARK MALLOY   FINDINGS: No acute fracture or malalignment. Mild 1st CMC and triscaphe joint degenerative changes with osteophytes. Prominent vascular calcifications of the volar wrist and forearm. No soft tissue gas. No retained radiopaque foreign body. Mild nonspecific wrist soft tissue swelling       1. Mild nonspecific wrist soft tissue swelling. No soft tissue gas 2. Mild 1st CMC and triscaphe joint osteoarthrosis.     I personally reviewed the image(s)/study and resident interpretation. I agree with the findings as stated by resident Luis Eduardo Helms. Data analyzed and images interpreted at [facility], [place], [place].   MACRO: None   Signed by: Yeni Mantilla 7/28/2024 1:27 PM Dictation workstation:   [id number]    MR wrist left w and wo IV contrast    Result Date: 7/27/2024  Interpreted By:  Zach Ordonez,  and Thien Francois STUDY: MRI of the  left wrist without and with IV contrast;  7/26/2024 2:55 pm   INDICATION: Signs/Symptoms:eval for left wrist osteomyelitis.    COMPARISON: None.   ACCESSION NUMBER(S): [id number]   ORDERING CLINICIAN: AZEEM SNYDER   TECHNIQUE: MR imaging of the  left wrist was obtained  without and with administration of 15 mL of intravenous Dotarem contrast.   FINDINGS: TENDONS: The extensor tendons are intact. The flexor tendons are intact. There is no tenosynovitis.   LIGAMENTS: The scapholunate ligament is intact. The lunotriquetral ligament is intact. The triangular fibrocartilage complex is intact.   JOINTS: There is no dislocation or subluxation. There is a moderate volume effusion with associated enhancement in the radiocarpal and midcarpal joints.   OSSEOUS STRUCTURES: The bone marrow signal is normal. There is no fracture or contusion. There is no marrow replacing lesion. There is no abnormal enhancement.   SOFT TISSUES: There is nonspecific T2 hyperintense edema and enhancement of the subcutaneous soft tissues surrounding the wrist. There is also nonspecific T2 hyperintense feathery edema of the thenar muscles.   The median nerve in the carpal tunnel is unremarkable. The ulnar nerve in Guyon's canal is unremarkable.       1. No abnormal T1 marrow signal or enhancement to suggest osteomyelitis. 2. Moderate-sized enhancing radiocarpal and midcarpal joint effusion. Reactive, inflammatory and septic arthritis is in the differential. Given clinical history of prior septic arthritis, joint fluid aspiration may be of value to exclude underlying infection. 3. Nonspecific edema and enhancement of the thenar muscles may represent infectious myositis in the appropriate clinical setting.       I personally reviewed the images/study and I agree with the findings as stated. This study was interpreted at [place], [place].   MACRO: None   Signed by: Zach Ordonez 7/27/2024 8:05 AM Dictation workstation:   [id number]    Electrocardiogram, 12-lead PRN ACS symptoms    Result Date: 7/25/2024  Normal sinus  rhythm Possible Anterior infarct , age undetermined Abnormal ECG When compared with ECG of 22-JUL-2024 11:06, Borderline criteria for Anterior infarct are now Present Nonspecific T wave abnormality no longer evident in Inferior leads Confirmed by Mario Carroll ([id number]) on 7/25/2024 10:27:18 PM    CT chest abdomen pelvis w IV contrast    Result Date: 7/25/2024  Interpreted By:  Ivan Alfredo  and Adrian Alexander STUDY: CT CHEST ABDOMEN PELVIS W IV CONTRAST;  7/25/2024 4:36 pm   INDICATION:   Signs/Symptoms:Please perform delayed phase CT to assess SVC, IVC, abdominal and pelvic vasculature Per EMR, 39-year-old male with extensive past medical history currently admitted to the MICU for septic shock secondary to right groin tunneled HD line. He had a TTE 7/23 which showed visitations along the TV with severe TR.   COMPARISON: CT chest abdomen and pelvis 01/14/2024, CT thoracic and lumbar spine 04/30/2024   ACCESSION NUMBER(S): [id number]   ORDERING CLINICIAN: AZEEM SNYDER   TECHNIQUE: CT of the chest, abdomen, and pelvis was performed.  Contiguous axial images were obtained at 3 mm slice thickness through the chest, abdomen and pelvis. Coronal and sagittal reconstructions at 3 mm slice thickness were performed. 75 ml of contrast material Omnipaque 350 were administered intravenously without immediate complication.   FINDINGS:   CHEST:   LUNG/PLEURA/LARGE AIRWAYS: There are multiple new bilateral pulmonary nodules several of them showing cavitation. For example a left upper lobe cavitary lesion with soft tissue component measures  1.7 x 1.5 cm (series 3, image 144). A right middle lobe cavitary lesion measures 8 mm (series 3, image 183). There also few ground-glass nodules in both lungs (annotated on PACS. There is mild-moderate bilateral pleural effusions (series 2, image 69) with adjacent lung atelectasis. Central airways are patent.   VESSELS: Aorta and pulmonary arteries are normal caliber.  Mild  atherosclerotic changes are noted of the aorta and branching vessels. Moderate coronary artery calcifications are present. There is a catheter coursing through the inferior cavoatrial junction with its tip terminating within the right ventricle. There is 2.2 x 1.5 cm hypodense thrombus in the right atrium around the catheter. This likely correlates with a vegetation seen on the prior echocardiogram. There is severe attenuation of the superior vena cava, bilateral brachiocephalic veins with the demonstration of calcifications. There is also poor visualization of the bilateral subclavian and internal jugular veins. The azygos venous system is prominent. There is extensive intercostal collateralization (series 2 images 39 through 78).   HEART: Heart is normal in size. Trace pericardial effusion.   MEDIASTINUM AND JOSEF: Redemonstration of several enlarged mediastinal lymph nodes, for example a 1.4 cm left superior mediastinal node visualized on series 2, image 19, and a 1.9 cm paratracheal node visualized on series 2, image 24. The esophagus is normal.   CHEST WALL AND LOWER NECK: Redemonstration of mild bilateral gynecomastia. The visualized thyroid gland appears within normal limits.   ABDOMEN:   LIVER: The liver is normal in size without evidence of focal liver lesions. Decreased enhancement of the liver is likely secondary to contrast timing. Hepatic veins are unopacified..   BILE DUCTS: The intrahepatic and extrahepatic ducts are not dilated.   GALLBLADDER: No calcified stones. No wall thickening.   PANCREAS: The pancreas appears unremarkable.   SPLEEN: The spleen is normal in size without focal lesions.   ADRENAL GLANDS: Bilateral adrenal glands appear normal.   KIDNEYS AND URETERS: Bilateral kidneys are atrophied with poor enhancement. Bilateral hypodense lesions likely correlate to benign renal cysts. Re-demonstration of vascular calcifications. No hydronephrosis.   PELVIS:   BLADDER: Urinary bladder is  decompressed, limited for evaluation.   REPRODUCTIVE ORGANS: No pelvic masses.   BOWEL: The stomach is unremarkable. Small bowel loops are normal in caliber. There is diffuse circumferential thickening of the entire colon and rectum with the surrounding stranding likely representing proctocolitis. No evidence of pneumatosis or pneumoperitoneum. Appendix appears normal   VESSELS: There is a tunneled right femoral vein approach central venous catheter coursing through the inferior vena cava with the tip terminating within the right ventricle. The inferior vena cava is stenosed/diminutive in appearance with multiple calcifications. Multiple calcific foci are also noted in the bilateral iliac and common femoral veins. There is extensive venous collateralization present. There is no aneurysmal dilatation of the abdominal aorta. There is severe atherosclerotic calcification of the infrarenal abdominal aorta and associated iliofemoral vasculature.   PERITONEUM/RETROPERITONEUM/LYMPH NODES: There is no free or loculated fluid collection, no free intraperitoneal air. There is mild diffuse mesenteric haziness likely secondary to fluid overload. The retroperitoneum appears normal.  No abdominopelvic lymphadenopathy is present.   BONE AND SOFT TISSUE: There is a new T10 vertebral body compression fracture as evidenced on series 602 image 118. Re-demonstration of severe osseous changes, likely secondary to renal osteodystrophy. There is a extensive subcutaneous edema throughout the chest and abdominal wall consistent with the anasarca.       CHEST: 1. Interval development of bilateral pulmonary nodular opacities several of them showing cavitation. Findings are concerning for septic embolism/cavitary pneumonia. 2. Bilateral pleural effusions. 3. Right femoral central venous catheter with tip terminating in the right ventricle. Hypodense thrombus around the catheter in the right atrium correlating with the vegetation seen on prior  echocardiogram. 4. Severely attenuated superior vena cava, bilateral brachiocephalic, subclavian and internal jugular veins with the calcifications in the brachiocephalic veins with extensive collaterals in the chest wall likely representing chronic thrombosis.   ABDOMEN-PELVIS: 1. Severely attenuated inferior vena cava with multiple calcifications, consistent with chronic IVC thrombosis. 2. Interval development of a T10 vertebral body fracture. Re-demonstration of severe osseous changes throughout the thoracolumbar spine. Findings are likely secondary to renal osteodystrophy. 3. Diffuse thickening of the colon and rectum with the pericolonic stranding consistent with the proctocolitis which can be inflammatory/infectious in etiology. Correlate with clinical symptoms. 4. Diffuse mesenteric haziness and anasarca likely representing fluid overload. 5. Bilateral atrophied kidneys with hypoenhancement, representative of medical renal disease.   I personally reviewed the images/study and I agree with the findings as stated by Benjamin Campbell DO PGY-2. This study was interpreted at [facility], [place], [place].   MACRO: None   Signed by: Ivan Alfredo 7/25/2024 9:58 PM Dictation workstation:   [id number]    Transesophageal Echo (POLI)    Result Date: 7/24/2024   [facility] [address] [place] [address]                Tel [phone] and Fax [phone] TRANSESOPHAGEAL ECHOCARDIOGRAM REPORT  Patient Name:      XIOMARA CASSANDRA Maya Physician:    [id number] Penny Shaw MD Study Date:        7/24/2024            Ordering Provider:    [id number] AZEEM SNYDER MRN/PID:           [id number]             Fellow:               [id number] Trey Hurtado MD Accession#:         [id number]         Nurse:                Radha Alaniz RN Date of Birth/Age: 1985 / 39 years Sonographer: Gender:            M                    Additional Staff: Height:            175.00 cm            Admit Date: Weight:            74.00 kg             Admission Status:     Inpatient -                                                               Routine BSA / BMI:         1.89 m2 / 24.16      Encounter#:           [id number]                    kg/m2 Blood Pressure:    123/88 mmHg          Department Location: Study Type:    TRANSESOPHAGEAL ECHO (POLI) Diagnosis/ICD: Acute and subacute endocarditis, unspecified-I33.9 Indication:    endocarditis CPT Code:      POLI Complete-65041; Doppler Limited-68561; Color Doppler-14928 Patient History: Allergies:         Daptomycin. Pertinent History: Renal Failure, HTN, Hyperlipidemia, Previous DVT, CHF and                    A-Fib. L AKA, R above elbow amputation. Study Detail: Agitated saline used as a contrast agent for intraseptal flow               evaluation.  PHYSICIAN INTERPRETATION: POLI Details: Technically adequate omniplane transesophageal echocardiogram performed. Agitated saline contrast echo was performed to assess for the presence of a patent foramen ovale. POLI Medication: The pharynx was anesthetized with lidocaine ointment and Topix spray. The patient was sedated using moderate sedation. Midazolam and Fentanyl was used to sedate the patient for this exam. POLI Procedure: The probe was passed without difficulty. Complications encountered during procedure: Patient tolerated the procedure well without any apparent complications. Left Ventricle: Left ventricular ejection fraction is moderately decreased, by visual estimate at 40%. There is global hypokinesis of the left ventricle with minor regional variations. The left ventricular cavity size is normal. Left ventricular diastolic filling was not assessed. Left Atrium: The left atrium is normal in size. There  is no definite left atrial thrombus present. There is a moderately sized patent foramen ovale. The patent foramen ovale was visualized using agitated saline contrast. A bubble study using agitated saline was performed. Bubble study is positive. A moderate PFO (11-20 bubbles) was demonstrated. There is no thrombus visualized in the left atrial appendage. Agitated saline contrast study was positive for an intracardiac shunt (moderate sized PFO). Right Ventricle: The right ventricle is mildly enlarged. There is moderately reduced right ventricular systolic function. Right Atrium: The right atrium is mildly dilated. There is a device visualized in the right atrium. There is a large vegetation that circumferentially encases the femoral TDC ( catheter). There is also involvement of the TV with likely destruction of the septal leaflet of the TV and resultant severe TR. ( the vegatation surrounding the catheter measures at least 1.9cm x 2.5cm. Aortic Valve: The aortic valve is trileaflet. There is no evidence of aortic valve regurgitation. Trielaflet AV with no paravalular abscess or AI, however there is a small mobile echodensity on the LVOT side of the AV with independent motion whcih could be consitent with a small vegetation vs degenerative changes. Mitral Valve: The mitral valve is normal in structure. There is no evidence of mitral valve regurgitation. Tricuspid Valve: The tricuspid valve is abnormal. There is severe tricuspid regurgitation. The tricuspid valve regurgitant jet is eccentrically directed. Please see RA comments. Pulmonic Valve: The pulmonic valve is structurally normal. There is trace pulmonic valve regurgitation. Pericardium: There is no pericardial effusion noted. Aorta: The aortic root is normal. In comparison to the previous echocardiogram(s): Compared with study dated 7/23/2024, Catheter associated vegetation and anatomy of TV are better visualized on today's study. Otherwise, there are no gross  changes from the previous surface echo.  CONCLUSIONS:  1. Left ventricular ejection fraction is moderately decreased, by visual estimate at 40%.  2. There is global hypokinesis of the left ventricle with minor regional variations.  3. Mildly enlarged right ventricle.  4. There is moderately reduced right ventricular systolic function.  5. Agitated saline contrast study was positive for an intracardiac shunt (moderate sized PFO).  6. There is a large vegetation that circumferentially encases the femoral TDC ( catheter). There is also involvement of the TV with likely destruction of the septal leaflet of the TV and resultant severe TR. ( the vegatation surrounding the catheter measures at least 1.9cm x 2.5cm.  7. The tricuspid valve is abnormal.  8. Severe tricuspid regurgitation visualized.  9. Trielaflet AV with no paravalular abscess or AI, however there is a small mobile echodensity on the LVOT side of the AV with independent motion whcih could be consitent with a small vegetation vs degenerative changes. 10. Moderately sized patent foramen ovale. 11. No left atrial thrombus. 12. MICU team nofitied of findings at the time of study. 13. A bubble study using agitated saline was performed. Bubble study is positive. A moderate PFO (11-20 bubbles) was demonstrated. 14. Compared with study dated 7/23/2024, Catheter associated vegetation and anatomy of TV are better visualized on today's study. Otherwise, there are no gross changes from the previous surface echo. QUANTITATIVE DATA SUMMARY: LV SYSTOLIC FUNCTION BY 2D PLANIMETRY (MOD):                      Normal Ranges: EF-Visual:      40 % LV EF Reported: 40 %  [id number] Penny Shaw MD Electronically signed on 7/24/2024 at 6:31:34 PM  ** Final **     Vascular US Lower Extremity Venous Duplex Right    Result Date: 7/24/2024            [place] [address]   Tel [phone] and Fax [phone]  [facility]  EXTREMITY [facility]ENOUS [facility]UP[facility] RIGHT  Patient Name:      XIOMARA TRUJILLO PHYLLIS         Reading Physician:  [id number] Bridger Ervin MD Study Date:        7/24/2024            Ordering Physician: [id number] AZEEM SNYDER MRN/PID:           [id number]             Technologist:       Antonietta Sierra RVT Accession#:        [id number]         Technologist 2: Date of Birth/Age: 1985 / 39 years Encounter#:         [id number] Gender:            M Admission Status:  Inpatient            Location Performed: [facility]  Diagnosis/ICD: Other specified soft tissue disorders-M79.89 Indication:    Limb swelling CPT Codes:     39032 Peripheral venous duplex scan for DVT Limited  CONCLUSIONS: Right Lower Venous: Unable to obtain right groin compression or visualize the DEIV, CFV, profunda and proximal FVs due to line placement. Can not rule out thrombus in non-visualized areas. Remainder visualized vessels show no evidence of DVT. Left Lower Venous: There are chronic changes visualized in the common femoral and proximal femoral veins.  Imaging & Doppler Findings:  Right       Compressible Thrombus        Flow FV Proximal     Yes        None   Spontaneous/Phasic FV Mid          Yes        None FV Distal       Yes        None Popliteal       Yes        None   Spontaneous/Phasic Peroneal        Yes        None PTV             Yes        None  Left        Compress Thrombus CFV         Partial  Chronic FV Proximal Partial  Chronic  [id number] Bridger Ervin MD Electronically signed by [id number] Bridger Ervin MD on 7/24/2024 at 12:41:59 PM  ** Final **     ECG 12 lead    Result Date: 7/24/2024  Normal sinus rhythm Prolonged QT Abnormal ECG When compared with ECG of 22-JUL-2024 10:44, No significant change was found See ED provider note for full interpretation and clinical correlation Confirmed by Kelsie Cherry ([id number]) on 7/24/2024  5:46:20 AM    Transthoracic Echo (TTE) Limited    Result Date: 7/23/2024   [facility], [address] [place] [address]                Tel [phone] and Fax [phone] TRANSTHORACIC ECHOCARDIOGRAM REPORT  Patient Name:      XIOMARA FERGUSON         Francesco Physician:    [id number] Penny Shaw MD Study Date:        7/23/2024            Ordering Provider:    [id number] DENISHA SONG MRN/PID:           [id number]             Fellow: Accession#:        [id number]         Nurse: Date of Birth/Age: 1985 / 39 years Sonographer:          Reji Bhakta RDCS Gender:            M                    Additional Staff: Height:            175.26 cm            Admit Date:           7/22/2024 Weight:            73.48 kg             Admission Status:     Inpatient -                                                               Routine BSA / BMI:         1.89 m2 / 23.92      Encounter#:           [id number]                    kg/m2 Blood Pressure:    94/44 mmHg           Department Location:  [address] Study Type:    TRANSTHORACIC ECHO (TTE) LIMITED Diagnosis/ICD: Sepsis, unspecified organism-A41.9 Indication:    concern for endocarditis CPT Code:      Echo Limited-92209; Doppler Limited-95647; Color Doppler-75694 Patient History: Pertinent History: HTN, HLD, septic shock, SIRS, tachycardia, PVVD, ESRD, NICM,                    HFrEF. Study Detail: The following Echo studies were performed: M-Mode, 2D, Doppler and               color flow. Technically challenging study due to prominent lung               artifact, body habitus, patient lying in supine position and poor               acoustic windows. Definity used as a contrast agent for               endocardial border definition. Total contrast used for this                procedure was 4.0 mL via IV push.  Critical Event Critical Event: Test was completed as per department protocol. Critical Finding: Possible Mass and or Endocarditis in right atrium/tricuspid valve. Time Test was Completed: 9:49:00 AM Notified: Dr. Shaw. Attending notification time: 9:55:00 AM  PHYSICIAN INTERPRETATION: Left Ventricle: Left ventricular ejection fraction is moderately decreased, calculated by Briesno's biplane at 35%. There is global hypokinesis of the left ventricle with minor regional variations. The left ventricular cavity size is normal. Left ventricular diastolic filling was not assessed. There is no definite left ventricular thrombus visualized. Left Atrium: The left atrium was not assessed. Right Ventricle: The right ventricle is normal in size. There is reduced right ventricular systolic function. Right Atrium: The right atrium was not well visualized. There is a device visualized in the right atrium. Several small mobile echodensities noted within the RA likely attached to dialysis catheter and thickened TV with mobile echo densities associated with the TV concerning for possible vegetations with severe TR. Not well seen. Recommend POLI to further assess if clinically indicated. Aortic Valve: The aortic valve is trileaflet. There is mild to moderate aortic valve cusp calcification. There is no evidence of aortic valve regurgitation. Mitral Valve: The mitral valve is mildly thickened. There is mild thickening and calcification of the anterior mitral valve leaflet. There is moderate mitral annular calcification. There is trace mitral valve regurgitation. Tricuspid Valve: The tricuspid valve is abnormal. There is severe tricuspid regurgitation. The Doppler estimated RVSP is mildly elevated at 37.1 mmHg. There is reversed systolic hepatic vein flow. TV with likely mobile echodensities associated tih the leaflets though not well seen with severe TR. RVSP may be underestimated due to severity of  TR. Pulmonic Valve: The pulmonic valve is structurally normal. There is physiologic pulmonic valve regurgitation. Pericardium: There is a trivial pericardial effusion. Aorta: The aortic root is normal. Systemic Veins: The inferior vena cava was not well visualized. In comparison to the previous echocardiogram(s): Compared with the prior exam POLI from 5/14/2024 ( [facility]) the TV was previously normal with only trivial TR. The dialysis catheter seen within the RA is new since the prior POLI and the mobile echodensities and severe TR are new as well. The LV systolic function was already mild to moderately reduced at that time.  CONCLUSIONS:  1. Left ventricular ejection fraction is moderately decreased, calculated by Briseno's biplane at 35%.  2. There is global hypokinesis of the left ventricle with minor regional variations.  3. No left ventricular thrombus visualized.  4. There is reduced right ventricular systolic function.  5. Several small mobile echodensities noted within the RA likely attached to dialysis catheter and thickened TV with mobile echo densities associated with the TV concerning for possible vegetations with severe TR. Not well seen. Recommend POLI to further assess if clinically indicated.  6. There is moderate mitral annular calcification.  7. The tricuspid valve is abnormal.  8. Mildly elevated RVSP.  9. Severe tricuspid regurgitation visualized. 10. TV with likely mobile echodensities associated tih the leaflets though not well seen with severe TR. RVSP may be underestimated due to severity of TR. 11. There is reversed systolic hepatic vein flow. 12. Primary service notified of findings at the time of reporting. 13. Compared with the prior exam POLI from 5/14/2024 ( [facility]) the TV was previously normal with only trivial TR. The dialysis catheter seen within the RA is new since the prior POLI and the mobile echodensities and severe TR are new as well. The LV systolic function was already mild to  moderately reduced at that time. QUANTITATIVE DATA SUMMARY: 2D MEASUREMENTS:                          Normal Ranges: IVSd:          0.80 cm   (0.6-1.1cm) LVPWd:         0.75 cm   (0.6-1.1cm) LVIDd:         3.95 cm   (3.9-5.9cm) LVIDs:         3.25 cm LV Mass Index: 51.5 g/m2 LV % FS        17.7 % AORTA MEASUREMENTS:                      Normal Ranges: Ao Sinus, d: 3.20 cm (2.1-3.5cm) LV SYSTOLIC FUNCTION BY 2D PLANIMETRY (MOD):                      Normal Ranges: EF-A4C View:    40 % (>=55%) EF-A2C View:    29 % EF-Biplane:     35 % LV EF Reported: 35 %  RIGHT VENTRICLE: TAPSE: 18.9 mm RV s'  0.12 m/s TRICUSPID VALVE/RVSP:                             Normal Ranges: Peak TR Velocity: 2.92 m/s RV Syst Pressure: 37.1 mmHg (< 30mmHg)  [id number] Penny Shaw MD Electronically signed on 7/23/2024 at 10:49:40 AM  ** Final **     Point of Care Ultrasound    Result Date: 7/22/2024  Bridger Olguin DO     7/22/2024  7:19 PM Performed by: Bridger Olguin DO Authorized by: Bridger Olguin DO  Procedure: Cardiac Ultrasound Findings:  Views: parasternal long, parasternal short, apical four and subxiphoid The pericardial space was visualized and was NEGATIVE for a significant pericardial effusion. Activity: Ventricular contractions were visualized. LV: LV systolic function was DECREASED. Impression: Cardiac: The focused cardiac ultrasound exam had ABNORMAL findings as specified. Comments: Patient IVC extremely collapsible.      XR foot right 3+ views    Result Date: 7/22/2024  Interpreted By:  Yeni Mantilla, STUDY: Right ankle, 3 views. Right foot, 3 views.   INDICATION: Signs/Symptoms:osteo concern chronic wound; Signs/Symptoms:c/f osteomyelitis.   COMPARISON: None.   ACCESSION NUMBER(S): [id number]; [id number]   ORDERING CLINICIAN: ANDRÉS PEARSON   STUDY: Right ankle/foot: There is severe demineralization of the bones which limits evaluation for subtle fractures. Questionable age-indeterminate fractures of the 2nd, 3rd, and  4th metatarsal necks with slight impacted appearance and irregularity. Severe vascular calcifications of the ankle and foot. There is ulceration in the posterior aspect of the calcaneus. No osseous erosion, cortical indistinctness, regional osteopenia, or periosteal reaction to suggest radiographic findings of osteomyelitis. Ankle mortise is normally aligned.       Ulceration in the posterior aspect of the calcaneus without radiographic findings of osteomyelitis.   Severe demineralization of the bones which limits evaluation for subtle fractures.   Questionable age-indeterminate 2nd, 3rd, and 4th metatarsal neck fractures which may be however artifactual. Correlate with history for trauma and point tenderness.   MACRO: None.   Signed by: Yeni Mantilla 7/22/2024 2:54 PM Dictation workstation:   [id number]    XR ankle right 3+ views    Result Date: 7/22/2024  Interpreted By:  Yeni Mantilla, STUDY: Right ankle, 3 views. Right foot, 3 views.   INDICATION: Signs/Symptoms:osteo concern chronic wound; Signs/Symptoms:c/f osteomyelitis.   COMPARISON: None.   ACCESSION NUMBER(S): [id number]; [id number]   ORDERING CLINICIAN: ANDRÉS PEARSON   STUDY: Right ankle/foot: There is severe demineralization of the bones which limits evaluation for subtle fractures. Questionable age-indeterminate fractures of the 2nd, 3rd, and 4th metatarsal necks with slight impacted appearance and irregularity. Severe vascular calcifications of the ankle and foot. There is ulceration in the posterior aspect of the calcaneus. No osseous erosion, cortical indistinctness, regional osteopenia, or periosteal reaction to suggest radiographic findings of osteomyelitis. Ankle mortise is normally aligned.       Ulceration in the posterior aspect of the calcaneus without radiographic findings of osteomyelitis.   Severe demineralization of the bones which limits evaluation for subtle fractures.   Questionable age-indeterminate 2nd, 3rd, and 4th  metatarsal neck fractures which may be however artifactual. Correlate with history for trauma and point tenderness.   MACRO: None.   Signed by: Yeni Mantilla 7/22/2024 2:54 PM Dictation workstation:   [id number]    XR chest 1 view    Result Date: 7/22/2024  STUDY: Chest Radiograph;  7/22/2024 10:48AM INDICATION: Hypotension. COMPARISON: 5/28/2024 XR Chest. ACCESSION NUMBER(S): [id number] ORDERING CLINICIAN: LOUIE NORIEGA TECHNIQUE:  Frontal chest was obtained at 11:42 hours. FINDINGS: CARDIOMEDIASTINAL SILHOUETTE: Cardiomediastinal silhouette is normal in size and configuration.  LUNGS: Linear atelectasis in the left base.  Small hazy opacities in the lung bases similar compared to prior.  Trace pleural effusions.  ABDOMEN: No remarkable upper abdominal findings.  BONES: No acute osseous changes.    Linear atelectasis in the left base. Small hazy opacities in the lung bases similar compared to prior. Trace pleural effusions. Signed by Roe Ruiz MD        Assessment and plan:  Edwar Hemphill is a 39 y.o. male PMHx significant for ESRD (MWF via right femoral line), NICM, HFrEF (LVEF 35-40% on 5/2024 POLI), anemia, DM2, Left AKA, RUE amputation, HTN, PVD, GERD transferred from step down after being MICU for sepsis c/b lar. Patient now stable, completed course of ceftaroline (7/23-8/6); per ID will continue with Vanco for 6-8 weeks (end date 9/13/2024). Sepsis c/b femoral catheter tip vegetation in RA involving TV, inducing severe tricuspid regurg. Additionally, c/b osteomyelitis of left wrist requiring I/D on 7/29/24 by Ortho. R consulted on 8/13 to evaluate if right femoral HD catheter can be exchanged for one with a pigtail or if a tunnelled PICC line could be placed next to it. Current Right common femoral vein tunneled dialysis catheter placed in IR on 5/24/2024. Endovascular consulted and they tentatively planning for vegectomy with Dr. Dennis Monday 7/29. Vegectomy aborted due to to concerns for infected  thrombus in the IVC and potential incomplete treatment without removal of the TDC. IR not able to exchange current dialysis line as risk too high that they may not be able to reestablish access. Given poor overall prognosis, GOC discussion was done and patient is now DNR/DNI, patient met and denied hospice on 8/17. Patient undergoes iHD MWF for ESRD, tolerated well day of transfer. Plan moving forward is to finish IV vanc outpatient once plan for LTC administration is in place. Will trial indefinite suppression with Bactrim 1SS daily after vancomycin course per ID. Will need f/up with ID (Dr Doroteo Graham on 9/10).     8/20 Updates:  - Reengage with vascular medicine regarding current warfarin 2.5mg dose. Warfarin currently being held. INR 2.7 today and 4.6 yesterday.   - Will continue current abx of Vancomycin   - Patient became hypoglycemic overnight, adjusted SSI to mild dosing. Will continue to monitor.  - Patient is consistently hypotensive (77/48 - 91/72 over last 24 hours). On midodrine 20 mg TID. Has no symptoms, will continue to monitor.   - Discharge plan is to return to [facility] once IV abx is coordinated     Micro:   7/31 bcx negative  7/29 abscess? negative  7/29 bcx 4/4 negative  7/27 L wrist synovial fluid positive MRSA  7/27 bcx 1/2 positive MRSA  7/25 bcx 2/4 positive mrsa  7/22 bcx 4/4 positive MRSA    Antibiotics:   Vanc 7/23- present *plan until 9/13  Zosyn on 7/23  Ceftaroline 7/23-8/6  Vanc locks    ###Sepsis 2/2 MRSA  ###Femoral catheter/RA vegetation  ###Left wrist osteomyelitis s/p I/D  :: I/D of left wrist on 7/29/24 by Dr. Chavezr  :: attempted aspiration of left hand by Ortho on 8/14, unsuccessful, no purulence, likely hematoma  :: Will need indefinite bactrim suppression after vanc per ID  :: pressors weaned, continuing on midodrine  :: 7/29 Bcx negative  Plan:  - c/w vanc 5mg  - organize for dispo: vanc needs to be instilled in femoral catheter after dialysis, make sure CDC  is able and aware  - per ortho, suture removal on 8/21  - c/w midodrine 20mg TID  - c/w PRN tylenol, benadryl, zofran  - c/w dilaudid/oxy for breakthrough pain     ###PVD  :: Left AKA  :: RUE amputation  :: Vascular medicine following; continued on heparin drip and transitioned to Coumadin with INR therapeutic x 2 on 8/11 (2.3) and 8/12 (3.0); Heparin discontinued on 8/12.   :: Was placed on coumadin 2.5mg, INR most recently 2.7 with INR 4.6 8/19  Plan:  - currently holding coumadin, will reengage with vascular medicine regarding adjusting dose   - c/w atorvastatin 50mg nightly    ###ESRD  Plan:  - iHD MWF     ##HFrEF (LVEF 35-40% on 5/2024 POLI)  ##NICM  ##Anemia  :: baseline Hgb 7-8  :: Hgb 8.0 today  Plan:  - c/w EPO 10,000 units MWF  - Continue to monitor      ##DM2  :: Patient became hypoglycemic overnight to glucose of 39  :: He states his appetite is baseline and is eating/drinking well   Plan:  - Adjusted SSI to mild dosing  - Will continue to monitor.     #Constipation  - c/w rectal bisacodyl 10mg daily  -miralax BID  - sennosides-docusate sodium, 1 tablet, oral, BID     #Hypothyroidism  - levo 150mg daily     #Preventative  - B complex-vitamin C-folic acid, 1 capsule, oral, Daily  - cholecalciferol, 50,000 Units, oral, Once per day on Monday Thursday  - folic acid, 1 mg, oral, Daily  - thiamine, 200 mg, oral, Daily     #Misc  - melatonin 6mg nightly  - QUEtiapine, 12.5 mg, oral, Nightly     F: PRN  E: PRN  N: Adult diet Consistent Carb 75 gm/meal  A: pIV  DVT ppx: Warfarin 2.5 mg  GI ppx: protonix  CODE STATUS: DNR and No Intubation   NOK: Shanthi Hemphill (Mother) [phone]    Patient and plan discussed with attending physician Dr. Hernandez.    Nikia Simmons MD  PGY-1 Internal Medicine

## 2024-08-20 NOTE — PROGRESS NOTES
Transitional Care Coordination Progress Note:  Patient discussed during interdisciplinary rounds.  Team members present: MD, TCC  Plan per Medical/Surgical team: Pt s/p MICU transfer, team plan to re-consult with Vascular Medicine regarding Coumadin dose. Per team he will need IV Vanc 750mg post HD, and vanc lock in the line (vancomycin 5 mg/mL + heparin 2500 units/mL in NS lock 2mL into the line after dialysis).  Payer: [employer]haliwal [employer]  Status: Inpatient  Discharge disposition: [facility]  Potential Barriers: none  ADOD: 8/21  Per medical team pt is not ready for hospice. Updates sent to [facility] via [employer] and they notified of anticipated discharge date, they confirmed he can return to facility when medically ready. NP confirmed receipt of IV Vanc order via secure chat and that she will facilitate administration with pt's outpatient HD center. Care coordinator will continue to follow for discharge planning needs.     Mary Ann Jenkins RN  Transitional Care Coordinator (TCC)  [phone] or [phone]

## 2024-08-20 NOTE — DISCHARGE INSTRUCTIONS
Discharge Instructions    Dear Edwar Hemphill,    You were admitted to [facility] from 7/20/2024 to 8/21/2024 for sepsis, or an infection in your blood. The infection was thought to be likely from your dialysis catheter. The catheter was not taken out as you did not have other sites available for access because they have blood clots. For this reason, you were started on warfarin for which you will need to follow with vascular medicine outpatient to ensure adequate dosing. We treated your infection with antibiotics and you recovered. You will need to remain on antibiotics after discharge. You also had an infection in your left wrist, or septic arthritis, for which orthopedic surgery attempted aspiration of the infected fluid in your wrist. Lastly, you were found to have low blood pressures while you were in the hospital which was likely to your infection for which we started you on a medication to keep your blood pressures up. Please follow the instructions below regarding your medication changes and follow up appointments.     Medication changes:  Start these medications:  [ ] IV Vancomycin 1g after dialysis and vanc lock (vancomycin 5mg/mL + heparin 2500 units/mL in NS lock 2mL in the line until 9/13/2024 for treatment of your infection   [ ] Warfarin 2.5mg daily for your blood clots. Will need every other day INR monitoring until INR is stable at goal INR 2-3. Adjust dose as appropriate for therapeutic range.  [ ] Epoetin tyson three times a week   [ ] Nystatin topical application two times a day    [ ] Aracelis-colace two times a day for constipation   Stop these medications:  [ ] Atorvastatin 40mg  [ ] Vitamin D  [ ] Lactobacillus acidophilus   Change how you are taking these medications:  [ ] Midodrine 20mg three times a day for your low blood pressures     Appointments/Follow-Up:   Please follow up with your primary care physician, Dr. Mauricio Estrada, in 1 week to discuss your recent hospital admission  Please follow up  with [facility] clinic, Dr. Allen, on 9/9/2024 at 1:15 PM on for a post-operative evaluation at [facility]   Please follow up with Vascular Medicine, Dr. Nhung Carrillo, outpatient on 10/2/2024 at 2:30 PM to discuss your warfarin dosing   Please follow up with Infectious Disease, Dr. Barnett, outpatient on 9/10/2024 to discuss your antibiotic regimen   Please follow up with Cardiology, Dr. Carrillo, outpatient on 10/2/2024   Please follow up with Transplant, Dr. Boyd, outpatient on 10/24/2024  Please follow up with Dr. Licona (Nephrology) in outpatient hemodialysis    If you need to establish with new primary care doctor, please call and schedule an appointment with [facility]: phone: [phone]    It was a pleasure taking care of you,  Your  Care Team     Activity Restrictions  1) No driving until further instructed by your orthopaedic physician, which will be addressed at your outpatient appointments.    2) No driving or operating heavy machinery while taking narcotic pain medication.    3) Weight bearing status --> No weight bearing left hand       Splint/Cast care instructions:   1) Keep your splint on until 8/28/24. Then, remove BOTH the splint and the prevena from your left arm. Replace with a dry sterile dressing to be changed daily, or more frequently if needed.     2) Do not get your splint/cast wet for any reason. This includes protecting it from shower water, bath water, and the rain. If the cast/splint becomes wet for any reason, you need to be seen immediately, either in the emergency department or in the first available clinic appointment, in order to have the splint/cast changed. Allowing a wet splint/cast to sit on your skin may cause skin breakdown and infection.

## 2024-08-20 NOTE — CARE PLAN
Problem: Pain - Adult  Goal: Verbalizes/displays adequate comfort level or baseline comfort level  Outcome: Progressing     Problem: Fall/Injury  Goal: Verbalize understanding of personal risk factors for fall in the hospital  Outcome: Progressing     Problem: Skin  Goal: Decreased wound size/increased tissue granulation at next dressing change  Flowsheets (Taken 8/19/2024 2340)  Decreased wound size/increased tissue granulation at next dressing change:   Promote sleep for wound healing   Protective dressings over bony prominences   Utilize specialty bed per algorithm  Goal: Participates in plan/prevention/treatment measures  Flowsheets (Taken 8/19/2024 2340)  Participates in plan/prevention/treatment measures:   Discuss with provider PT/OT consult   Increase activity/out of bed for meals  Goal: Prevent/manage excess moisture  Flowsheets (Taken 8/19/2024 2340)  Prevent/manage excess moisture:   Cleanse incontinence/protect with barrier cream   Monitor for/manage infection if present   Follow provider orders for dressing changes   Use wicking fabric (obtain order)  Goal: Promote/optimize nutrition  Flowsheets (Taken 8/19/2024 2340)  Promote/optimize nutrition:   Assist with feeding   Monitor/record intake including meals   Offer water/supplements/favorite foods  Goal: Promote skin healing  Flowsheets (Taken 8/19/2024 2340)  Promote skin healing:   Turn/reposition every 2 hours/use positioning/transfer devices   Protective dressings over bony prominences   Assess skin/pad under line(s)/device(s)   Ensure correct size (line/device) and apply per  instructions   Rotate device position/do not position patient on device   The patient's goals for the shift include      The clinical goals for the shift include pt will remain HDS for shift    Pt transfer to [place] [id number] /  [id number]. Report given to HILLARY Vazquez. Vital signs stable at time of transfer. Pt denied any pain or sob. All pm medications  given. Pt  condition stable at time of transfer.

## 2024-08-20 NOTE — PROGRESS NOTES
"Edwar Hemphill is a 39 y.o. male on day 29 of admission.   Vascular Medicine Service initially consulted 7/23/24 for anticoagulation recommendations given recurrent line-related VTEs. Vascular Medicine Service re-consulted 8/6/24 for home going anticoagulation recommendations.   Patient with continued thrombocytopenia, and HIT has been ruled out.   Continues with as needed Warfarin dose adjustment to maintain INR 2-3.    Vascular Medicine Service signed off on 8/14/24.    Vascular Medicine Service asked to make recommendations for anticoagulation today.     Subjective   Patient reports feeling well today.  Valorie CP, SOB or bleeding.      Objective   Continues with as needed Warfarin dose adjustment.     Physical Exam  Resting in bed in NAD; getting cleaned up after BM  Respirations full and easy; breath sounds CTA; on RA  Normal heart sounds; vital signs stable   Extremities warm to touch; left hand with Kerlix wrap; right femoral HD line in place with ports capped; RLE with 2+ edema.    Patient is awake and participates in conversation; appropriate affect today.      Last Recorded Vitals  Blood pressure 79/53, pulse 85, temperature 35.8 °C (96.4 °F), resp. rate 18, height 1.753 m (5' 9.02\"), weight 86.6 kg (190 lb 14.7 oz), SpO2 94%.  Intake/Output last 3 Shifts:  I/O last 3 completed shifts:  In: 1515 (21.4 mL/kg) [P.O.:315; I.V.:400 (5.6 mL/kg); Other:800]  Out: 1402 (19.8 mL/kg) [Other:1402]  Dosing Weight: 70.9 kg     Relevant Results  Scheduled medications  atorvastatin, 40 mg, oral, Nightly  B complex-vitamin C-folic acid, 1 capsule, oral, Daily  bisacodyl, 10 mg, rectal, Daily  cholecalciferol, 50,000 Units, oral, Once per day on Monday Thursday  [START ON 8/21/2024] epoetin tyson or biosimilar, 12,000 Units, intravenous, Once per day on Monday Wednesday Friday  folic acid, 1 mg, oral, Daily  insulin lispro, 0-5 Units, subcutaneous, TID  levothyroxine, 150 mcg, oral, Daily before breakfast  melatonin, 6 mg, " oral, Nightly  midodrine, 20 mg, oral, TID  nystatin, 1 Application, Topical, BID  pantoprazole, 40 mg, oral, BID AC  [START ON 9/6/2024] pantoprazole, 40 mg, oral, Daily before breakfast  polyethylene glycol, 17 g, oral, BID  QUEtiapine, 12.5 mg, oral, Nightly  sennosides-docusate sodium, 1 tablet, oral, BID  thiamine, 200 mg, oral, Daily  vancomycin 5 mg/mL + heparin 2500 units/mL in NS lock, 2 mL, intra-catheter, q48h  [Held by provider] warfarin, 2.5 mg, oral, Daily      Results from last 7 days   Lab Units 08/20/24 0536 08/19/24 0446 08/18/24 0439   WBC AUTO x10*3/uL 5.7 6.5 6.6   HEMOGLOBIN g/dL 8.0* 8.6* 8.7*   HEMATOCRIT % 25.6* 27.8* 27.8*   PLATELETS AUTO x10*3/uL 97* 118* 109*       Results from last 7 days   Lab Units 08/20/24 0536 08/19/24  0446 08/18/24  0439   SODIUM mmol/L 137 138 140   POTASSIUM mmol/L 4.5 3.7 4.3   CHLORIDE mmol/L 102 102 101   CO2 mmol/L 27 25 26   BUN mg/dL 21 28* 25*   CREATININE mg/dL 4.61* 5.62* 5.03*   GLUCOSE mg/dL 92 50* 222*   CALCIUM mg/dL 8.3* 8.6 8.6       Results from last 7 days   Lab Units 08/20/24 0536 08/19/24 0446 08/18/24  0439   INR  2.7* 4.6* 4.2*                Assessment/Plan   Assessment & Plan  Septic shock (Multi)    Acute infective endocarditis (HHS-HCC)    Tricuspid valve vegetation (HHS-HCC)    Staphylococcal arthritis of left wrist (Multi)    Infection of left wrist (Multi)      39 year old male with complex medical history, admitted for finding of infective endocarditis due to HD line infection, complicated by septic shock.  Vascular Medicine Service asked to comment on anticoagulation recommendations today. Patient on anticoagulation therapy for chronic thrombosis of IVC, SVC, bilateral brachiocephalic, subclavian, internal jugular veins, and chronic changes noted in the left CFV and proximal femoral veins.    Review of labs shows decreased hemoglobin 8.0 grams, continued thrombocytopenia with stable platelets 97K without evidence of bleeding.   "     Date    INR     Warfarin Dose     Comments   8/8      1.3           5mg                Baseline INR; Start bridge to Warfarin  8/9      1.2           5mg  8/10    1.7           2.5mg  8/11     2.3          2.5mg              First day with therapeutic INR  8/12     3.0          2.5mg              Second day with therapeutic INR; Heparin infusion to stop at 6pm  8/13     2.5          2.5mg              Bridge to Warfarin has been completed.   8/14     2.3          2.5mg  8/15     2.7          2.5mg  8/16     2.8          2.5mg  8/17     4.2       dose held  8/18     4.2       dose held  8/19     4.6       dose held  8/20     2.7           2mg        Recommendations:  ~RESTART Warfarin dose at 2mg today   ~MONITOR for bleeding  ~Continue to monitor INR daily until INR has stabilized. Goal INR is 2-3.   ~Will need INR monitoring at [facility] 2 times a week, with\"as needed\" Warfarin dose adjustment to maintain INR goal 2-3. When patient discharged to home from SNF, will need outpatient Coumadin Clinic arranged prior to SNF discharge with initial appointment scheduled for 1-2 days after discharge.   ~Outpatient follow up with Dr. Nhung Carrillo from the Vascular Medicine Service has been scheduled as a virtual visit on 10/2/24 at 2:30 pm.      Vascular Medicine Service will sign off; please contact us on pager [phone] for any questions or concerns.      Plan of care discussed with Dr. Ryan  Plan of care discussed with Dr. Simmons from the Infectious Disease Service     Paula Vivar, APRN-CNP  Vascular Medicine Service   Pager [phone]                               "

## 2024-08-21 ENCOUNTER — APPOINTMENT (OUTPATIENT)
Dept: DIALYSIS | Facility: HOSPITAL | Age: 39
End: 2024-08-21
Payer: COMMERCIAL

## 2024-08-21 ENCOUNTER — APPOINTMENT (OUTPATIENT)
Dept: CARDIOLOGY | Facility: HOSPITAL | Age: 39
End: 2024-08-21
Payer: COMMERCIAL

## 2024-08-21 PROBLEM — T81.30XA WOUND DEHISCENCE: Status: ACTIVE | Noted: 2024-07-22

## 2024-08-21 LAB
ABO GROUP (TYPE) IN BLOOD: NORMAL
ALBUMIN SERPL BCP-MCNC: 2.7 G/DL (ref 3.4–5)
ANION GAP SERPL CALC-SCNC: 15 MMOL/L (ref 10–20)
ANTIBODY SCREEN: NORMAL
BUN SERPL-MCNC: 34 MG/DL (ref 6–23)
CALCIUM SERPL-MCNC: 8.8 MG/DL (ref 8.6–10.6)
CHLORIDE SERPL-SCNC: 99 MMOL/L (ref 98–107)
CO2 SERPL-SCNC: 27 MMOL/L (ref 21–32)
CREAT SERPL-MCNC: 6.58 MG/DL (ref 0.5–1.3)
EGFRCR SERPLBLD CKD-EPI 2021: 10 ML/MIN/1.73M*2
ERYTHROCYTE [DISTWIDTH] IN BLOOD BY AUTOMATED COUNT: 19.4 % (ref 11.5–14.5)
GLUCOSE BLD MANUAL STRIP-MCNC: 226 MG/DL (ref 74–99)
GLUCOSE BLD MANUAL STRIP-MCNC: 239 MG/DL (ref 74–99)
GLUCOSE BLD MANUAL STRIP-MCNC: 355 MG/DL (ref 74–99)
GLUCOSE BLD MANUAL STRIP-MCNC: 362 MG/DL (ref 74–99)
GLUCOSE BLD MANUAL STRIP-MCNC: 375 MG/DL (ref 74–99)
GLUCOSE SERPL-MCNC: 316 MG/DL (ref 74–99)
HCT VFR BLD AUTO: 29.8 % (ref 41–52)
HGB BLD-MCNC: 9.2 G/DL (ref 13.5–17.5)
INR PPP: 2.1 (ref 0.9–1.1)
MAGNESIUM SERPL-MCNC: 1.95 MG/DL (ref 1.6–2.4)
MCH RBC QN AUTO: 29 PG (ref 26–34)
MCHC RBC AUTO-ENTMCNC: 30.9 G/DL (ref 32–36)
MCV RBC AUTO: 94 FL (ref 80–100)
NRBC BLD-RTO: 0 /100 WBCS (ref 0–0)
PHOSPHATE SERPL-MCNC: 4.9 MG/DL (ref 2.5–4.9)
PLATELET # BLD AUTO: 128 X10*3/UL (ref 150–450)
POTASSIUM SERPL-SCNC: 5.6 MMOL/L (ref 3.5–5.3)
PROTHROMBIN TIME: 23.3 SECONDS (ref 9.8–12.8)
RBC # BLD AUTO: 3.17 X10*6/UL (ref 4.5–5.9)
RH FACTOR (ANTIGEN D): NORMAL
SODIUM SERPL-SCNC: 135 MMOL/L (ref 136–145)
VANCOMYCIN SERPL-MCNC: 18.8 UG/ML (ref 5–20)
WBC # BLD AUTO: 7 X10*3/UL (ref 4.4–11.3)

## 2024-08-21 PROCEDURE — 93010 ELECTROCARDIOGRAM REPORT: CPT | Performed by: INTERNAL MEDICINE

## 2024-08-21 PROCEDURE — 86901 BLOOD TYPING SEROLOGIC RH(D): CPT | Performed by: STUDENT IN AN ORGANIZED HEALTH CARE EDUCATION/TRAINING PROGRAM

## 2024-08-21 PROCEDURE — 83735 ASSAY OF MAGNESIUM: CPT

## 2024-08-21 PROCEDURE — 1100000001 HC PRIVATE ROOM DAILY

## 2024-08-21 PROCEDURE — 0HBEXZZ EXCISION OF LEFT LOWER ARM SKIN, EXTERNAL APPROACH: ICD-10-PCS | Performed by: STUDENT IN AN ORGANIZED HEALTH CARE EDUCATION/TRAINING PROGRAM

## 2024-08-21 PROCEDURE — 2500000001 HC RX 250 WO HCPCS SELF ADMINISTERED DRUGS (ALT 637 FOR MEDICARE OP)

## 2024-08-21 PROCEDURE — 0RCP3ZZ EXTIRPATION OF MATTER FROM LEFT WRIST JOINT, PERCUTANEOUS APPROACH: ICD-10-PCS | Performed by: STUDENT IN AN ORGANIZED HEALTH CARE EDUCATION/TRAINING PROGRAM

## 2024-08-21 PROCEDURE — 6350000001 HC RX 635 EPOETIN >10,000 UNITS: Performed by: NURSE PRACTITIONER

## 2024-08-21 PROCEDURE — 80202 ASSAY OF VANCOMYCIN: CPT

## 2024-08-21 PROCEDURE — 85027 COMPLETE CBC AUTOMATED: CPT

## 2024-08-21 PROCEDURE — 82947 ASSAY GLUCOSE BLOOD QUANT: CPT

## 2024-08-21 PROCEDURE — 85610 PROTHROMBIN TIME: CPT | Performed by: NURSE PRACTITIONER

## 2024-08-21 PROCEDURE — 80069 RENAL FUNCTION PANEL: CPT

## 2024-08-21 PROCEDURE — 2500000002 HC RX 250 W HCPCS SELF ADMINISTERED DRUGS (ALT 637 FOR MEDICARE OP, ALT 636 FOR OP/ED)

## 2024-08-21 PROCEDURE — 99232 SBSQ HOSP IP/OBS MODERATE 35: CPT

## 2024-08-21 PROCEDURE — 93005 ELECTROCARDIOGRAM TRACING: CPT

## 2024-08-21 PROCEDURE — 36415 COLL VENOUS BLD VENIPUNCTURE: CPT | Performed by: NURSE PRACTITIONER

## 2024-08-21 PROCEDURE — 8010000001 HC DIALYSIS - HEMODIALYSIS PER DAY

## 2024-08-21 PROCEDURE — 2500000001 HC RX 250 WO HCPCS SELF ADMINISTERED DRUGS (ALT 637 FOR MEDICARE OP): Performed by: NURSE PRACTITIONER

## 2024-08-21 RX ORDER — NYSTATIN 100000 [USP'U]/G
1 POWDER TOPICAL 2 TIMES DAILY
Start: 2024-08-21 | End: 2024-08-21 | Stop reason: HOSPADM

## 2024-08-21 RX ORDER — PANTOPRAZOLE SODIUM 40 MG/1
40 TABLET, DELAYED RELEASE ORAL
Start: 2024-09-06

## 2024-08-21 RX ORDER — OXYCODONE HYDROCHLORIDE 5 MG/1
5 TABLET ORAL EVERY 4 HOURS PRN
Status: CANCELLED
Start: 2024-08-21

## 2024-08-21 RX ORDER — WARFARIN 2 MG/1
TABLET ORAL
Start: 2024-08-21 | End: 2024-08-22 | Stop reason: HOSPADM

## 2024-08-21 RX ORDER — POLYETHYLENE GLYCOL 3350 17 G/17G
17 POWDER, FOR SOLUTION ORAL 2 TIMES DAILY PRN
Start: 2024-08-21

## 2024-08-21 RX ORDER — LEVOTHYROXINE SODIUM 150 UG/1
150 TABLET ORAL
Start: 2024-08-21

## 2024-08-21 RX ORDER — MIDODRINE HYDROCHLORIDE 10 MG/1
20 TABLET ORAL
Start: 2024-08-21 | End: 2024-08-21

## 2024-08-21 RX ORDER — PANTOPRAZOLE SODIUM 40 MG/1
40 TABLET, DELAYED RELEASE ORAL
Start: 2024-08-21 | End: 2024-09-05

## 2024-08-21 RX ORDER — DIPHENHYDRAMINE HCL 25 MG
25 CAPSULE ORAL EVERY 6 HOURS PRN
Qty: 30 CAPSULE | Refills: 0 | Status: SHIPPED | OUTPATIENT
Start: 2024-08-21

## 2024-08-21 RX ORDER — DEXTROSE 50 % IN WATER (D50W) INTRAVENOUS SYRINGE
12.5
Status: CANCELLED
Start: 2024-08-21

## 2024-08-21 RX ORDER — ACETAMINOPHEN 325 MG/1
650 TABLET ORAL EVERY 4 HOURS PRN
Start: 2024-08-21 | End: 2024-08-21

## 2024-08-21 RX ORDER — MIDODRINE HYDROCHLORIDE 10 MG/1
20 TABLET ORAL
Start: 2024-08-21

## 2024-08-21 RX ORDER — AMOXICILLIN 250 MG
1 CAPSULE ORAL 2 TIMES DAILY PRN
Start: 2024-08-21

## 2024-08-21 RX ORDER — INSULIN LISPRO 100 [IU]/ML
2-10 INJECTION, SOLUTION INTRAVENOUS; SUBCUTANEOUS
Start: 2024-08-21

## 2024-08-21 RX ORDER — ACETAMINOPHEN 325 MG/1
650 TABLET ORAL EVERY 6 HOURS PRN
Start: 2024-08-21

## 2024-08-21 RX ORDER — BISACODYL 10 MG/1
10 SUPPOSITORY RECTAL DAILY PRN
Start: 2024-08-21

## 2024-08-21 RX ORDER — OXYCODONE HYDROCHLORIDE 5 MG/1
5 TABLET ORAL EVERY 4 HOURS PRN
Start: 2024-08-21

## 2024-08-21 RX ORDER — ONDANSETRON HYDROCHLORIDE 2 MG/ML
4 INJECTION, SOLUTION INTRAVENOUS EVERY 6 HOURS PRN
Status: CANCELLED
Start: 2024-08-21

## 2024-08-21 RX ORDER — DIPHENHYDRAMINE HCL 25 MG
25 CAPSULE ORAL EVERY 6 HOURS PRN
Qty: 30 CAPSULE | Refills: 0 | Status: CANCELLED | OUTPATIENT
Start: 2024-08-21

## 2024-08-21 RX ORDER — DEXTROSE 50 % IN WATER (D50W) INTRAVENOUS SYRINGE
25
Status: CANCELLED
Start: 2024-08-21

## 2024-08-21 RX ORDER — INSULIN GLARGINE 100 [IU]/ML
4 INJECTION, SOLUTION SUBCUTANEOUS NIGHTLY
Start: 2024-08-21

## 2024-08-21 ASSESSMENT — PAIN SCALES - GENERAL
PAINLEVEL_OUTOF10: 9
PAINLEVEL_OUTOF10: 9
PAINLEVEL_OUTOF10: 0 - NO PAIN
PAINLEVEL_OUTOF10: 9

## 2024-08-21 ASSESSMENT — COGNITIVE AND FUNCTIONAL STATUS - GENERAL
STANDING UP FROM CHAIR USING ARMS: TOTAL
MOVING TO AND FROM BED TO CHAIR: TOTAL
EATING MEALS: A LITTLE
TOILETING: A LOT
CLIMB 3 TO 5 STEPS WITH RAILING: TOTAL
WALKING IN HOSPITAL ROOM: TOTAL
DRESSING REGULAR LOWER BODY CLOTHING: TOTAL
MOBILITY SCORE: 7
MOVING FROM LYING ON BACK TO SITTING ON SIDE OF FLAT BED WITH BEDRAILS: A LOT
TURNING FROM BACK TO SIDE WHILE IN FLAT BAD: TOTAL
DAILY ACTIVITIY SCORE: 12
DRESSING REGULAR UPPER BODY CLOTHING: A LOT
HELP NEEDED FOR BATHING: A LOT
PERSONAL GROOMING: A LOT

## 2024-08-21 ASSESSMENT — PAIN DESCRIPTION - DESCRIPTORS: DESCRIPTORS: ACHING

## 2024-08-21 ASSESSMENT — PAIN - FUNCTIONAL ASSESSMENT: PAIN_FUNCTIONAL_ASSESSMENT: NO/DENIES PAIN

## 2024-08-21 ASSESSMENT — PAIN SCALES - PAIN ASSESSMENT IN ADVANCED DEMENTIA (PAINAD)
TOTALSCORE: MEDICATION (SEE MAR)
TOTALSCORE: MEDICATION (SEE MAR)
BODYLANGUAGE: NORMAL
CONSOLABILITY: NO NEED TO CONSOLE
FACIALEXPRESSION: SAD, FRIGHTENED, FROWN

## 2024-08-21 ASSESSMENT — PAIN SCALES - WONG BAKER
WONGBAKER_NUMERICALRESPONSE: HURTS LITTLE BIT
WONGBAKER_NUMERICALRESPONSE: HURTS LITTLE BIT

## 2024-08-21 NOTE — CARE PLAN
The patient's goals for the shift include      The clinical goals for the shift include Pt will remain safe and free from fall/injury    Over the shift, the patient did not make progress toward the following goals. Barriers to progression include not utilizing the call light button. Recommendations to address these barriers include using the call light,.

## 2024-08-21 NOTE — SIGNIFICANT EVENT
Palliative medicine has been following for complex medical decision making / goals of care, symptom management, and pt/family support. Goals are clear, symptoms being managed by primary team at this time. We will sign off, please reconsult if needed.     Cori Woodward DNP, CNP  Palliative Medicine

## 2024-08-21 NOTE — CARE PLAN
The patient's goals for the shift include      The clinical goals for the shift include Pt will remain safe and free from fall/injury    Over the shift, the patient did not make progress toward the following goals. Barriers to progression include   Problem: Skin  Goal: Participates in plan/prevention/treatment measures  8/21/2024 1526 by Kae Lopez RN  Outcome: Progressing  8/21/2024 1523 by Kae Lopez, HILLARY  Outcome: Progressing  8/21/2024 1522 by Kae Lopez RN  Outcome: Progressing  8/21/2024 1502 by Kae Lopez RN  Outcome: Progressing  8/21/2024 1501 by Kae Lopez, RN  Outcome: Progressing  8/21/2024 1500 by Kae Lopez RN  Outcome: Progressing   . Recommendations to address these barriers include   Problem: Skin  Goal: Promote skin healing  8/21/2024 1526 by Kae Lopez RN  Outcome: Progressing  8/21/2024 1523 by Kae Lopez RN  Outcome: Progressing  8/21/2024 1522 by Kae Lopez, RN  Outcome: Progressing  8/21/2024 1502 by Kae Lopez RN  Outcome: Progressing  8/21/2024 1501 by Kae Lopez RN  Outcome: Progressing  8/21/2024 1500 by Kae Lopez RN  Outcome: Progressing   .

## 2024-08-21 NOTE — PROGRESS NOTES
Physical Therapy                 Therapy Communication Note    Patient Name: Edwar Hemphill  MRN: [id number]  Today's Date: 8/21/2024     Discipline: Physical Therapy    Missed Visit Reason: Missed Visit Reason: Other (Comment)    Missed Time: Attempt    Comment:  PT treat attempted; L wrist Wound has dehisced; now NWBAHMAN ROWLAND and pending return to OR tomorrow for wound closure. Will re-attempt at later date.

## 2024-08-21 NOTE — PROGRESS NOTES
Edwar Hemphill is a 39 y.o. male on day 30 of admission presenting with Septic shock (Multi).  SW made referral to Outpatient Palliative care program from [facility] since pt did not want hospice at this time.  For additional support they will be contacting his mother once discharged.        CHARISMA ESQUIVEL

## 2024-08-21 NOTE — PROGRESS NOTES
"Orthopaedic Hand Surgery Progress Note  08/21/24    Subjective:  Doing well on regular floor. No left hand complaints. Eating a meal.     Objective:  /71   Pulse 104   Temp 36.6 °C (97.9 °F)   Resp 14   Ht 1.753 m (5' 9.02\")   Wt 86.6 kg (190 lb 14.7 oz)   SpO2 100%   BMI 28.18 kg/m²     Gen: arousable, NAD, appropriately conversational  Cardiac: RRR to peripheral palpation  Resp: nonlabored on RA  GI: soft, non-distended  MSK:  LUE:  - Hematoma and wound dehiscence on dorsal left hand  - Generally nontender  - No signs of infection: no purulence or erythema  - Motor intact in axillary/AIN/PIN/ulnar distributions  - SILT axillary/radial/median/ulnar distributions  - Hand wwp, cap refill brisk   - Compartments soft and compressible, no pain with passive stretch of digits    Labs: had supratherapeutic INR x3 days from 8/17 to 8/19 (ranged from 4.2 to 4.6). Most recently, is within therapeutic range at 2.7     Assessment/Plan: 39 y.o. male s/p L wrist I&D on 7/29/2024 with Dr. Allen. Wound has dehisced. Does not appear infected. Patient refused return to OR. Bedside hematoma evac, sharp debridement of dead skin edges, closure with 2-0 nylon, placement of prevena wound vac, volar resting slab splint applied. OK for DC    Plan:  - Weightbearing: NWB L hand in splint. Ok to remove splint in 1 week when prevena is removed  - DVT PPx: SCDs, per primary  - Diet: OK for regular diet from orthopedic perspective  - Pain: Multimodal pain regimen per primary  - Antibiotics: Vanc per ID  - Dressing: Remove prevena in 1 week along with splint. Replace with soft dressing when removed  - Drain: None  - PT/OT consult    Dispo:  Follow up Dr Allen in 1 week. Ortho hand to follow peripherally while inpatient     Ino Flores MD, MD  Orthopedic Surgery PGY-4  [facility]  Available by Epic Chat    While inpatient, this patient will be followed by the Orthopaedic Hand team. Please see contact " information below:    Ortho Donal Vargas MD PGY2  Ino Flores MD PGY4    Please page [phone] (ortho on-call) after 6pm and on weekends.

## 2024-08-21 NOTE — NURSING NOTE
Report from Sending RN:    Report From: Shi  Recent Surgery of Procedure: No  Baseline Level of Consciousness (LOC): A and O x 3  Oxygen Use: No  Type: NA  Diabetic: Yes  Last BP Med Given Day of Dialysis: None, Keep MAP above 60  Last Pain Med Given: None  Lab Tests to be Obtained with Dialysis: No  Blood Transfusion to be Given During Dialysis: No  Available IV Access: Yes  Medications to be Administered During Dialysis: No  Continuous IV Infusion Running: No  Restraints on Currently or in the Last 24 Hours: No  Hand-Off Communication: Bed bound  Dialysis Catheter Dressing: L Fem. intact  Last Dressing Change: unknown

## 2024-08-21 NOTE — CARE PLAN
The patient's goals for the shift include      The clinical goals for the shift include Pt will remain safe and free from fall/injury    Over the shift, the patient did not make progress toward the following goals. Barriers to progression include   Problem: Skin  Goal: Promote skin healing  8/21/2024 1502 by Kae Lopez RN  Outcome: Progressing  8/21/2024 1501 by Kae Lopez RN  Outcome: Progressing  8/21/2024 1500 by Kae Lopez RN  Outcome: Progressing   . Recommendations to address these barriers include   Problem: Fall/Injury  Goal: Verbalize understanding of personal risk factors for fall in the hospital  8/21/2024 1502 by Kae Lopez RN  Outcome: Progressing  8/21/2024 1501 by Kae Lopez RN  Outcome: Progressing  8/21/2024 1500 by Kae Lopez RN  Outcome: Progressing   .

## 2024-08-21 NOTE — PROGRESS NOTES
Discharge Transfer to Facility  Patient will discharge today to: [facility]  Facility name: [facility]  Facility phone number: [phone] x [phone]   [facility] [place] aware.  Bedside nurse (name) aware to call report: Keira  Phone number for report:  [phone]   Ambulance transport has been arranged.  Ambulance Company name: [facility]  [facility] phone number: [phone]   time: 8pm  Patient and mother Shanthi [phone] aware of transport time.  Patient has been approved for discharge after 8pm: Yes, approved by medical team and facility.  Transport was initially arranged for 5pm but spoke with Marybel at [facility] [phone] to change transport time to 8pm per team/nursing request as pt is scheduled for dialysis at 3:30pm and it can not be moved up sooner due to pt being on isolation precautions. Unit secretary provided with blue transfer slip. Discharge orders sent to facility via [facility]. Care coordinator will continue to follow for discharge planning needs.    Addendum 5853: Per medical team Ortho is planning to take pt back to OR tomorrow to repair wound dehiscence, 8pm transport canceled per team request via [facility]. [facility] notified of above.    Addendum 0915: Received message from medical team stating Prevena wound vac will be applied at bedside today, so pt will not go to OR tomorrow. Team requesting transport for 8pm scheduled again. [facility] [phone] confirmed 8pm stretcher transport via [facility]. [facility] notified of transport time and wound care updates with Prevena wound vac.    Mary Ann Jenkins RN  Transitional Care Coordinator (TCC)   [phone] or [phone]

## 2024-08-21 NOTE — PROGRESS NOTES
Subjective     Interval events:  No acute events overnight. Patient seen at bedside this AM. He is resting comfortably in bed and denies any acute complaints. States he is eating and drinking well, appetite is at his baseline. He reports eating candy (seen at bedside) throughout the day. Counseled on trying not to eat as much candy or atleast limiting intake. He is having regular bowel movements and no concern for diarrhea. He denies any fevers, chills, nausea/vomitting, chest pain, shortness of breath, abdominal pain, dizziness, or lightheadedness.    Objective     Vitals:  Vitals:    08/21/24 0418   BP: 81/57   Pulse: 93   Resp: 16   Temp: 36.2 °C (97.2 °F)   SpO2: 94%       I/O last 3 completed shifts:  In: 1980 (27.9 mL/kg) [P.O.:780; I.V.:400 (5.6 mL/kg); Other:800]  Out: 1402 (19.8 mL/kg) [Other:1402]  Dosing Weight: 70.9 kg   No intake/output data recorded.    Physical exam:  Constitutional: well appearing pt in NAD, alert and cooperative  Eyes: PERRL, EOMI, no icterus   ENMT: mucous membranes moist, no apparent injury, no lesions seen  Head/Neck: Neck supple, no apparent injury  Respiratory/Thorax: Lungs CTA bilaterally, non-labored breathing, no cough, on RA  Cardiovascular: Regular, rate and rhythm, no murmurs, normal S1 and S2  Gastrointestinal: Nondistended, soft, non-tender, BS hypoactive x 4  Musculoskeletal: RUE amputation, L aka, Left wrist surgical site wrapped  Extremities: +3 edema RLE  Neurological: alert and oriented x 3, speech clear, follows commands appropriately  Skin: Warm and dry/flaky, right fem dialysis line    Medications:  atorvastatin, 40 mg, oral, Nightly  B complex-vitamin C-folic acid, 1 capsule, oral, Daily  cholecalciferol, 50,000 Units, oral, Once per day on Monday Thursday  epoetin tyson or biosimilar, 12,000 Units, intravenous, Once per day on Monday Wednesday Friday  folic acid, 1 mg, oral, Daily  insulin lispro, 0-5 Units, subcutaneous, TID  levothyroxine, 150 mcg, oral,  Daily before breakfast  melatonin, 6 mg, oral, Nightly  midodrine, 20 mg, oral, TID  nystatin, 1 Application, Topical, BID  pantoprazole, 40 mg, oral, BID AC  [START ON 9/6/2024] pantoprazole, 40 mg, oral, Daily before breakfast  polyethylene glycol, 17 g, oral, BID  QUEtiapine, 12.5 mg, oral, Nightly  sennosides-docusate sodium, 1 tablet, oral, BID  thiamine, 200 mg, oral, Daily  vancomycin 5 mg/mL + heparin 2500 units/mL in NS lock, 2 mL, intra-catheter, q48h  warfarin, 2 mg, oral, Daily         PRN medications: acetaminophen **OR** [DISCONTINUED] acetaminophen **OR** [DISCONTINUED] acetaminophen, alteplase, bisacodyl, dextrose, dextrose, diphenhydrAMINE, glucagon, glucagon, ondansetron, oxyCODONE, vancomycin    Labs:  Results for orders placed or performed during the hospital encounter of 07/22/24 (from the past 24 hour(s))   Lactate   Result Value Ref Range    Lactate 0.8 0.4 - 2.0 mmol/L   POCT GLUCOSE   Result Value Ref Range    POCT Glucose 71 (L) 74 - 99 mg/dL   POCT GLUCOSE   Result Value Ref Range    POCT Glucose 155 (H) 74 - 99 mg/dL   POCT GLUCOSE   Result Value Ref Range    POCT Glucose 235 (H) 74 - 99 mg/dL   POCT GLUCOSE   Result Value Ref Range    POCT Glucose 239 (H) 74 - 99 mg/dL       Imaging:  Electrocardiogram, 12-lead PRN ACS symptoms    Result Date: 8/17/2024  Normal sinus rhythm Low voltage QRS Nonspecific T wave abnormality Abnormal ECG When compared with ECG of 14-AUG-2024 11:07, (unconfirmed) No significant change was found Confirmed by Mario Carroll ([id number]) on 8/17/2024 11:42:10 AM    MR brain wo IV contrast    Result Date: 8/12/2024  Interpreted By:  Gerardo Calle, STUDY: MR BRAIN WO IV CONTRAST;  8/11/2024 2:55 pm   INDICATION: Signs/Symptoms:rule out mycotic aneurysm.   COMPARISON: 08/06/2024   ACCESSION NUMBER(S): [id number]   ORDERING CLINICIAN: KYM COSTA   TECHNIQUE: Axial diffusion, axial T2, axial FLAIR, axial gradient echo T2, coronal T1, and sagittal T1 weighted MRI  images of the brain were obtained without intravenous contrast administration.   FINDINGS: The diffusion weighted images fail to demonstrate evidence of abnormal diffusion restriction to suggest acute infarction.   There is again evidence of mild-to-moderate brain parenchymal volume loss.   Mild nonspecific white matter changes are noted within cerebral hemispheres bilaterally. While nonspecific, white matter changes can be seen with small-vessel ischemic change or demyelinating processes among others.   There is no midline shift. The suprasellar/basilar cisterns are patent.   There are small retention cysts or polyps noted within the inferior maxillary sinuses bilaterally.   There is opacification of a few right mastoid air cells.       There is no MRI evidence of acute infarction on the diffusion weighted images.   There is again evidence of mild-to-moderate brain parenchymal volume loss.   Mild nonspecific white matter changes are noted within cerebral hemispheres bilaterally. While nonspecific, white matter changes can be seen with small-vessel ischemic change or demyelinating processes among others.   MACRO: None.   Signed by: Gerardo Calle 8/12/2024 6:45 AM Dictation workstation:   [id number]    Electrocardiogram, 12-lead PRN ACS symptoms    Result Date: 8/8/2024  Normal sinus rhythm Low voltage QRS Borderline ECG When compared with ECG of 23-JUL-2024 10:15, Borderline criteria for Anterior infarct are no longer Present ST no longer depressed in Anterior leads QT has shortened Confirmed by Mario Carroll ([id number]) on 8/8/2024 9:13:53 PM    XR abdomen 1 view    Result Date: 8/7/2024  Interpreted By:  Koffi Stroud, STUDY: XR ABDOMEN 1 VIEW; 8/7/2024 9:12 am   INDICATION: Signs/Symptoms:Vomiting.   COMPARISON: 01/17/2024.   ACCESSION NUMBER(S): [id number]   ORDERING CLINICIAN: UMAIR VALDEZ   FINDINGS: There is a right femoral venous catheter with tip overlying the right atrium. Moderate amount of stool in  nondistended loops of bowel. Correlate with a component of fecal impaction. Limited evaluation of pneumoperitoneum on supine imaging, however no gross evidence of free air is noted.   Left-sided pleural effusion. There is extensive vascular calcifications.   Osseous structures demonstrate no acute bony changes.       1.  No gross bowel distention. Moderate amount of stool within the colon and correlate with a component of fecal impaction.   Signed by: Koffi Stroud 8/7/2024 10:42 AM Dictation workstation:   [id number]    CT head wo IV contrast    Result Date: 8/6/2024  Interpreted By:  Nakul Wang, STUDY: CT HEAD WO IV CONTRAST;  8/6/2024 4:32 pm   INDICATION: Signs/Symptoms:c/f emboli iso large PFO.   COMPARISON: None.   ACCESSION NUMBER(S): [id number]   ORDERING CLINICIAN: ANGELIKA SELLERS   TECHNIQUE: Noncontrast enhanced CT was performed from the skullbase to the vertex.   FINDINGS: There is minimal prominence of the cortical sulci and sylvian fissures without hydrocephalus. There is no evidence of intracranial mass or extra-axial collection. The skull base, paranasal sinuses and orbital structures are normal. The calvarium is normal.       Minimal cortical volume loss without hydrocephalus, hemorrhage or extra-axial collection.   MACRO: none   Signed by: Nakul Wang 8/6/2024 4:43 PM Dictation workstation:   [id number]    Transthoracic Echo (TTE) Limited    Result Date: 8/2/2024   [facility], [address] [place] [address]                Tel [phone] and Fax [phone] TRANSTHORACIC ECHOCARDIOGRAM REPORT  Patient Name:      XIOMARA Maya Physician:    [id number] Zayda Richmond MD Study Date:        8/2/2024             Ordering Provider:    [id number] MARK MALLOY MRN/PID:           [id number]             Fellow: Accession#:         [id number]         Nurse: Date of Birth/Age: 1985 / 39 years Sonographer:          Reji Bhakta                                                               RDCS Gender:            M                    Additional Staff: Height:            175.26 cm            Admit Date: Weight:            75.30 kg             Admission Status:     Inpatient -                                                               Routine BSA / BMI:         1.91 m2 / 24.51      Encounter#:           [id number]                    kg/m2 Blood Pressure:    103/66 mmHg          Department Location:  [address] Study Type:    TRANSTHORACIC ECHO (TTE) LIMITED Diagnosis/ICD: Acute and subacute infective endocarditis-I33.0 Indication:    hypotension, R heart strain CPT Code:      Echo Limited-29178; Doppler Limited-00169; Color Doppler-63398 Patient History: Pertinent History: ESRD on HD (right femoral line), HFrEF T2DM, PVD (s/p left                    AKA and right forearm amputation), multiple line-related DVT                    (RIJ, LIJ, RUE, LUE, RLE, LLE,TV infective endocarditis. Study Detail: The following Echo studies were performed: 2D, M-Mode, Doppler and               color flow. Technically challenging study due to body habitus,               patient lying in supine position, poor acoustic windows and               prominent lung artifact. Definity used as a contrast agent for               endocardial border definition and agitated saline used as a               contrast agent for intraseptal flow evaluation. Total contrast               used for this procedure was 2.0 mL via IV push.  PHYSICIAN INTERPRETATION: Left Ventricle: Left ventricular ejection fraction is mildly decreased, calculated by Briseno's biplane at 43%. There are no regional wall motion abnormalities. The left ventricular cavity size is normal. The interventricular septum is flattened in diastole ('D' shaped left ventricle), consistent with right  ventricular volume overload. Left ventricular diastolic filling was not assessed. Left Atrium: The left atrium is normal in size. A bubble study using agitated saline was performed. Bubble study is positive. A large PFO (> 20 bubbles) was demonstrated. Right Ventricle: The right ventricle is moderately enlarged. There is normal right ventricular global systolic function. Right Atrium: The right atrium is moderately dilated. A large mass is seen the RA (2.4x2.6cm) seems like attached to a catheder (?) projecting from IVC/SVC (difficult to differentiate). A smilar mass was also noted in the POLI from 7/24/2024. Aortic Valve: The aortic valve appears structurally normal. There is mild aortic valve cusp calcification. There is trivial aortic valve regurgitation. Mitral Valve: The mitral valve is mildly thickened. There is mild to moderate mitral annular calcification. There is trace mitral valve regurgitation. Tricuspid Valve: The tricuspid valve is structurally normal. There is severe tricuspid regurgitation. The Doppler estimated RVSP is moderately elevated at 46.4 mmHg. RVSP is underestimated in the presence of severe TR and a triangular jet. Pulmonic Valve: The pulmonic valve is structurally normal. There is physiologic pulmonic valve regurgitation. Pericardium: There is a trivial pericardial effusion. Aorta: The aortic root is normal. Systemic Veins: The inferior vena cava appears to be of normal size. There is IVC inspiratory collapse greater than 50%. The hepatic vein shows a pattern of systolic flow reversal, suggestive of severe tricuspid regurgitation. In comparison to the previous echocardiogram(s): Compared with study dated 7/24/2024, No significant change.  CONCLUSIONS:  1. Left ventricular ejection fraction is mildly decreased, calculated by Briseno's biplane at 43%.  2. Right ventricular volume overload.  3. Moderately enlarged right ventricle.  4. There is normal right ventricular global systolic  function.  5. A large mass is seen the RA (2.4x2.6cm) seems like attached to a catheder (?) projecting from IVC/SVC (difficult to differentiate). A smilar mass was also noted in the POLI from 7/24/2024.  6. The right atrium is moderately dilated.  7. Moderately elevated right ventricular systolic pressure.  8. RVSP is underestimated in the presence of severe TR and a triangular jet.  9. Severe tricuspid regurgitation visualized. 10. A bubble study using agitated saline was performed. Bubble study is positive. A large PFO (> 20 bubbles) was demonstrated. 11. Compared with study dated 7/24/2024, No significant change. QUANTITATIVE DATA SUMMARY: 2D MEASUREMENTS:                          Normal Ranges: IVSd:          0.90 cm   (0.6-1.1cm) LVPWd:         0.80 cm   (0.6-1.1cm) LVIDd:         4.10 cm   (3.9-5.9cm) LVIDs:         2.90 cm LV Mass Index: 55.3 g/m2 LV % FS        29.3 % RA VOLUME BY A/L METHOD:                       Normal Ranges: RA Area A4C: 18.0 cm2 LV SYSTOLIC FUNCTION BY 2D PLANIMETRY (MOD):                      Normal Ranges: EF-A4C View:    38 % (>=55%) EF-A2C View:    46 % EF-Biplane:     43 % LV EF Reported: 43 % AORTIC VALVE:                         Normal Ranges: LVOT Max Nilesh:  0.80 m/s (<=1.1m/s) LVOT VTI:      13.30 cm LVOT Diameter: 1.80 cm  (1.8-2.4cm)  RIGHT VENTRICLE: RV Basal 4.90 cm TAPSE:   16.1 mm RV s'    0.09 m/s TRICUSPID VALVE/RVSP:                             Normal Ranges: Peak TR Velocity: 2.57 m/s RV Syst Pressure: 46.4 mmHg (< 30mmHg)  [id number] Zayda Richmond MD Electronically signed on 8/2/2024 at 11:40:55 AM  ** Final **     XR chest 1 view    Result Date: 7/31/2024  Interpreted By:  Eugene Romero and Ritchie Brandon STUDY: XR CHEST 1 VIEW;  7/31/2024 12:01 am   INDICATION: Signs/Symptoms:pulmonary edema.   COMPARISON: Chest x-ray 07/22/2024.   ACCESSION NUMBER(S): [id number]   ORDERING CLINICIAN: MARK MALLOY   FINDINGS: AP radiograph of the chest was provided.    CARDIOMEDIASTINAL SILHOUETTE: Cardiomediastinal silhouette is normal in size and configuration.   LUNGS: Mildly increased perihilar/interstitial markings compared to prior examination and hazy bibasilar airspace opacities. Unchanged linear atelectasis of the left lung base. There is blunting of the bilateral costophrenic angles consistent with small pleural effusions. No evidence of pneumothorax.   ABDOMEN: No remarkable upper abdominal findings.   BONES: No acute osseous changes.       1.  Mildly increased perihilar/interstitial lung markings with hazy bibasilar airspace opacities. Recommend clinical correlation as findings could reflect developing interstitial/pulmonary edema. Underlying infectious process can not definitively be excluded. 2. Persistent small bilateral pleural effusions and unchanged linear atelectasis of the left lung base.   I personally reviewed the images/study and I agree with the findings as stated by resident Neftaly Harkins. This study was interpreted at [facility], [place], [place].   MACRO: None   Signed by: Eugene Romero 7/31/2024 8:44 AM Dictation workstation:   [id number]    Cardiac Catheterization Procedure    Result Date: 7/30/2024   [facility], Cath Lab, [address] [place] [address] Cardiovascular Catheterization Report Patient Name:     XIOMARA FERGUSON        Performing Physician:  [id number] Dennis MD Study Date:       7/29/2024           Verifying Physician:   Austen Dennis MD MRN/PID:          [id number]            Cardiologist/Co-scrub: Accession#:       [id number]        Ordering Physician:    Ashley CASTILLO Date of           1985 / 39      Fellow:                Ashley Castillo Birth/Age:        years                                       MD Gender:           M                   Fellow: Encounter#:       [id number]  Study: IVC venogram  Procedure Description: After infiltration of local anesthetic, the left femoral vein was identified with two dimensional ultrasound. Under direct ultrasound visualization, the left femoral vein was cannulated with a micropuncture technique. A 8 F sheath was placed in the vein. Post-procedure, the venous sheath was pulled and pressure was applied to the site. Following routine access via the left CFV, we had difficulty advancing the wholey wire into the high IVC, therefore we obtained an angiogram of the IVC via the left CFV sheath. There was severe narrowing of the intrahepatic IVC, possibly full with infected thrombus. Procedure was aborted.  Hemo Personnel: +----------------+---------+ Name            Duty      +----------------+---------+ Grant Dennis MD 1 +----------------+---------+  Cardiac Cath Post Procedure Notes: Post Procedure Diagnosis: See below. Blood Loss:               Estimated blood loss during the procedure was 5 mls. Specimens Removed:        Number of specimen(s) removed: none. ____________________________________________________________________________________ CONCLUSIONS:  1. Patient has right groin tunneled dialysis catheter [TDC] extending into the IVC_RA junction with near occlusion of his intrahepatic IVC.  2. Planned procedure of vegetation removal from the tip of the TDC was aborted.  3. We could have possibly performed the vegetectomy via the right internal jugular approach; however, I am concerned that the intrahepatic IVC occlusion represents an infected thrombus, and therefore removing the vegetation from the tip of the TDC by itself would be incomplete treatment and may not help with clearing his MRSA bacteremia. Furthermore, pursuing an invasive treatment of the above would likely necessitate the removal of the TDC and patient has very limited  options for alternative sites. Additionally, anticoagulation for such procedure would be challenging as patient has thrombocytopenia and recent GI bleed.  4. Plan to review the case and discuss in details with Dr. Hunter. ICD 10 Codes: Acute and subacute infective endocarditis-I33.0  CPT Codes: Moderate Sedation Services 1st additional 15 minutes patient >5 years-63331; Ultrasound guidance for vascular access-96131; Venogram, IVC-25101  [id number] Grant Dennis MD Performing Physician Electronically signed by [id number] Grant Dennis MD on 7/30/2024 at 8:47:05 AM  ** Final **     XR wrist left 1-2 views    Result Date: 7/29/2024  Interpreted By:  Zach Ordonez,  and Janki Ogden STUDY: XR WRIST LEFT 1-2 VIEWS; ;  7/29/2024 10:57 am   INDICATION: Signs/Symptoms:pre-aspiration.   COMPARISON: None.   ACCESSION NUMBER(S): [id number]   ORDERING CLINICIAN: MARK MALLOY   FINDINGS: 2 views of left wrist No acute fracture or malalignment. Severe vascular calcifications visualized. Osteoporotic changes of the radius and ulna. stable mild soft tissue swelling. Vascular calcifications present as well       No osseous abnormality seen. Soft tissue edema.     I personally reviewed the images/study and I agree with the findings as stated by Alin Shearer MD, PGY-2 this study was interpreted at [facility], [place], [place].   MACRO: None   Signed by: Zach Ordonez 7/29/2024 1:00 PM Dictation workstation:   [id number]    XR wrist left 1-2 views    Result Date: 7/28/2024  Interpreted By:  Yeni Mantilla and Beyersdorf Conner STUDY: Left wrist, three views   INDICATION: Signs/Symptoms:suspected septic arthritis, planning joint aspiration.   COMPARISON: None.   ACCESSION NUMBER(S): [id number]   ORDERING CLINICIAN: MARK MALLOY   FINDINGS: No acute fracture or malalignment. Mild 1st CMC and triscaphe joint degenerative changes with osteophytes. Prominent vascular calcifications of the volar  wrist and forearm. No soft tissue gas. No retained radiopaque foreign body. Mild nonspecific wrist soft tissue swelling       1. Mild nonspecific wrist soft tissue swelling. No soft tissue gas 2. Mild 1st CMC and triscaphe joint osteoarthrosis.     I personally reviewed the image(s)/study and resident interpretation. I agree with the findings as stated by resident Luis Eduardo Helms. Data analyzed and images interpreted at [facility], [place], [place].   MACRO: None   Signed by: Yeni Mantilla 7/28/2024 1:27 PM Dictation workstation:   [id number]    MR wrist left w and wo IV contrast    Result Date: 7/27/2024  Interpreted By:  Zach Ordonez and Abuhamdeh Imran STUDY: MRI of the  left wrist without and with IV contrast;  7/26/2024 2:55 pm   INDICATION: Signs/Symptoms:eval for left wrist osteomyelitis.   COMPARISON: None.   ACCESSION NUMBER(S): [id number]   ORDERING CLINICIAN: AZEEM SNYDER   TECHNIQUE: MR imaging of the  left wrist was obtained  without and with administration of 15 mL of intravenous Dotarem contrast.   FINDINGS: TENDONS: The extensor tendons are intact. The flexor tendons are intact. There is no tenosynovitis.   LIGAMENTS: The scapholunate ligament is intact. The lunotriquetral ligament is intact. The triangular fibrocartilage complex is intact.   JOINTS: There is no dislocation or subluxation. There is a moderate volume effusion with associated enhancement in the radiocarpal and midcarpal joints.   OSSEOUS STRUCTURES: The bone marrow signal is normal. There is no fracture or contusion. There is no marrow replacing lesion. There is no abnormal enhancement.   SOFT TISSUES: There is nonspecific T2 hyperintense edema and enhancement of the subcutaneous soft tissues surrounding the wrist. There is also nonspecific T2 hyperintense feathery edema of the thenar muscles.   The median nerve in the carpal tunnel is unremarkable. The ulnar nerve in Guyon's canal  is unremarkable.       1. No abnormal T1 marrow signal or enhancement to suggest osteomyelitis. 2. Moderate-sized enhancing radiocarpal and midcarpal joint effusion. Reactive, inflammatory and septic arthritis is in the differential. Given clinical history of prior septic arthritis, joint fluid aspiration may be of value to exclude underlying infection. 3. Nonspecific edema and enhancement of the thenar muscles may represent infectious myositis in the appropriate clinical setting.       I personally reviewed the images/study and I agree with the findings as stated. This study was interpreted at [place], [place].   MACRO: None   Signed by: Zach Ordonez 7/27/2024 8:05 AM Dictation workstation:   [id number]    Electrocardiogram, 12-lead PRN ACS symptoms    Result Date: 7/25/2024  Normal sinus rhythm Possible Anterior infarct , age undetermined Abnormal ECG When compared with ECG of 22-JUL-2024 11:06, Borderline criteria for Anterior infarct are now Present Nonspecific T wave abnormality no longer evident in Inferior leads Confirmed by Mario Carroll ([id number]) on 7/25/2024 10:27:18 PM    CT chest abdomen pelvis w IV contrast    Result Date: 7/25/2024  Interpreted By:  Ivan Alfredo and Stevens Alex STUDY: CT CHEST ABDOMEN PELVIS W IV CONTRAST;  7/25/2024 4:36 pm   INDICATION:   Signs/Symptoms:Please perform delayed phase CT to assess SVC, IVC, abdominal and pelvic vasculature Per EMR, 39-year-old male with extensive past medical history currently admitted to the MICU for septic shock secondary to right groin tunneled HD line. He had a TTE 7/23 which showed visitations along the TV with severe TR.   COMPARISON: CT chest abdomen and pelvis 01/14/2024, CT thoracic and lumbar spine 04/30/2024   ACCESSION NUMBER(S): [id number]   ORDERING CLINICIAN: AZEEM SNYDER   TECHNIQUE: CT of the chest, abdomen, and pelvis was performed.  Contiguous axial images were  obtained at 3 mm slice thickness through the chest, abdomen and pelvis. Coronal and sagittal reconstructions at 3 mm slice thickness were performed. 75 ml of contrast material Omnipaque 350 were administered intravenously without immediate complication.   FINDINGS:   CHEST:   LUNG/PLEURA/LARGE AIRWAYS: There are multiple new bilateral pulmonary nodules several of them showing cavitation. For example a left upper lobe cavitary lesion with soft tissue component measures  1.7 x 1.5 cm (series 3, image 144). A right middle lobe cavitary lesion measures 8 mm (series 3, image 183). There also few ground-glass nodules in both lungs (annotated on PACS. There is mild-moderate bilateral pleural effusions (series 2, image 69) with adjacent lung atelectasis. Central airways are patent.   VESSELS: Aorta and pulmonary arteries are normal caliber.  Mild atherosclerotic changes are noted of the aorta and branching vessels. Moderate coronary artery calcifications are present. There is a catheter coursing through the inferior cavoatrial junction with its tip terminating within the right ventricle. There is 2.2 x 1.5 cm hypodense thrombus in the right atrium around the catheter. This likely correlates with a vegetation seen on the prior echocardiogram. There is severe attenuation of the superior vena cava, bilateral brachiocephalic veins with the demonstration of calcifications. There is also poor visualization of the bilateral subclavian and internal jugular veins. The azygos venous system is prominent. There is extensive intercostal collateralization (series 2 images 39 through 78).   HEART: Heart is normal in size. Trace pericardial effusion.   MEDIASTINUM AND JOSEF: Redemonstration of several enlarged mediastinal lymph nodes, for example a 1.4 cm left superior mediastinal node visualized on series 2, image 19, and a 1.9 cm paratracheal node visualized on series 2, image 24. The esophagus is normal.   CHEST WALL AND LOWER NECK:  Redemonstration of mild bilateral gynecomastia. The visualized thyroid gland appears within normal limits.   ABDOMEN:   LIVER: The liver is normal in size without evidence of focal liver lesions. Decreased enhancement of the liver is likely secondary to contrast timing. Hepatic veins are unopacified..   BILE DUCTS: The intrahepatic and extrahepatic ducts are not dilated.   GALLBLADDER: No calcified stones. No wall thickening.   PANCREAS: The pancreas appears unremarkable.   SPLEEN: The spleen is normal in size without focal lesions.   ADRENAL GLANDS: Bilateral adrenal glands appear normal.   KIDNEYS AND URETERS: Bilateral kidneys are atrophied with poor enhancement. Bilateral hypodense lesions likely correlate to benign renal cysts. Re-demonstration of vascular calcifications. No hydronephrosis.   PELVIS:   BLADDER: Urinary bladder is decompressed, limited for evaluation.   REPRODUCTIVE ORGANS: No pelvic masses.   BOWEL: The stomach is unremarkable. Small bowel loops are normal in caliber. There is diffuse circumferential thickening of the entire colon and rectum with the surrounding stranding likely representing proctocolitis. No evidence of pneumatosis or pneumoperitoneum. Appendix appears normal   VESSELS: There is a tunneled right femoral vein approach central venous catheter coursing through the inferior vena cava with the tip terminating within the right ventricle. The inferior vena cava is stenosed/diminutive in appearance with multiple calcifications. Multiple calcific foci are also noted in the bilateral iliac and common femoral veins. There is extensive venous collateralization present. There is no aneurysmal dilatation of the abdominal aorta. There is severe atherosclerotic calcification of the infrarenal abdominal aorta and associated iliofemoral vasculature.   PERITONEUM/RETROPERITONEUM/LYMPH NODES: There is no free or loculated fluid collection, no free intraperitoneal air. There is mild diffuse  mesenteric haziness likely secondary to fluid overload. The retroperitoneum appears normal.  No abdominopelvic lymphadenopathy is present.   BONE AND SOFT TISSUE: There is a new T10 vertebral body compression fracture as evidenced on series 602 image 118. Re-demonstration of severe osseous changes, likely secondary to renal osteodystrophy. There is a extensive subcutaneous edema throughout the chest and abdominal wall consistent with the anasarca.       CHEST: 1. Interval development of bilateral pulmonary nodular opacities several of them showing cavitation. Findings are concerning for septic embolism/cavitary pneumonia. 2. Bilateral pleural effusions. 3. Right femoral central venous catheter with tip terminating in the right ventricle. Hypodense thrombus around the catheter in the right atrium correlating with the vegetation seen on prior echocardiogram. 4. Severely attenuated superior vena cava, bilateral brachiocephalic, subclavian and internal jugular veins with the calcifications in the brachiocephalic veins with extensive collaterals in the chest wall likely representing chronic thrombosis.   ABDOMEN-PELVIS: 1. Severely attenuated inferior vena cava with multiple calcifications, consistent with chronic IVC thrombosis. 2. Interval development of a T10 vertebral body fracture. Re-demonstration of severe osseous changes throughout the thoracolumbar spine. Findings are likely secondary to renal osteodystrophy. 3. Diffuse thickening of the colon and rectum with the pericolonic stranding consistent with the proctocolitis which can be inflammatory/infectious in etiology. Correlate with clinical symptoms. 4. Diffuse mesenteric haziness and anasarca likely representing fluid overload. 5. Bilateral atrophied kidneys with hypoenhancement, representative of medical renal disease.   I personally reviewed the images/study and I agree with the findings as stated by Benjamin Campbell DO PGY-2. This study was interpreted at  [facility], [place], [place].   MACRO: None   Signed by: Ivan De Diosjuvenal 7/25/2024 9:58 PM Dictation workstation:   [id number]    Transesophageal Echo (POLI)    Result Date: 7/24/2024   [facility], [address] [place] [address]                Tel [phone] and Fax [phone] TRANSESOPHAGEAL ECHOCARDIOGRAM REPORT  Patient Name:      XIOMARA Maya Physician:    [id number] Penny Shaw MD Study Date:        7/24/2024            Ordering Provider:    [id number] AZEEM SNYDER MRN/PID:           [id number]             Fellow:               [id number] Trey Hurtado MD Accession#:        [id number]         Nurse:                Radha Alaniz RN Date of Birth/Age: 1985 / 39 years Sonographer: Gender:            M                    Additional Staff: Height:            175.00 cm            Admit Date: Weight:            74.00 kg             Admission Status:     Inpatient -                                                               Routine BSA / BMI:         1.89 m2 / 24.16      Encounter#:           [id number]                    kg/m2 Blood Pressure:    123/88 mmHg          Department Location: Study Type:    TRANSESOPHAGEAL ECHO (POLI) Diagnosis/ICD: Acute and subacute endocarditis, unspecified-I33.9 Indication:    endocarditis CPT Code:      POLI Complete-90017; Doppler Limited-01913; Color Doppler-79996 Patient History: Allergies:         Daptomycin. Pertinent History: Renal Failure, HTN, Hyperlipidemia, Previous DVT, CHF and                    A-Fib. L AKA, R above elbow amputation. Study Detail: Agitated saline used as a contrast agent for intraseptal flow               evaluation.  PHYSICIAN INTERPRETATION: POLI Details: Technically adequate omniplane  transesophageal echocardiogram performed. Agitated saline contrast echo was performed to assess for the presence of a patent foramen ovale. POLI Medication: The pharynx was anesthetized with lidocaine ointment and Topix spray. The patient was sedated using moderate sedation. Midazolam and Fentanyl was used to sedate the patient for this exam. POLI Procedure: The probe was passed without difficulty. Complications encountered during procedure: Patient tolerated the procedure well without any apparent complications. Left Ventricle: Left ventricular ejection fraction is moderately decreased, by visual estimate at 40%. There is global hypokinesis of the left ventricle with minor regional variations. The left ventricular cavity size is normal. Left ventricular diastolic filling was not assessed. Left Atrium: The left atrium is normal in size. There is no definite left atrial thrombus present. There is a moderately sized patent foramen ovale. The patent foramen ovale was visualized using agitated saline contrast. A bubble study using agitated saline was performed. Bubble study is positive. A moderate PFO (11-20 bubbles) was demonstrated. There is no thrombus visualized in the left atrial appendage. Agitated saline contrast study was positive for an intracardiac shunt (moderate sized PFO). Right Ventricle: The right ventricle is mildly enlarged. There is moderately reduced right ventricular systolic function. Right Atrium: The right atrium is mildly dilated. There is a device visualized in the right atrium. There is a large vegetation that circumferentially encases the femoral TDC ( catheter). There is also involvement of the TV with likely destruction of the septal leaflet of the TV and resultant severe TR. ( the vegatation surrounding the catheter measures at least 1.9cm x 2.5cm. Aortic Valve: The aortic valve is trileaflet. There is no evidence of aortic valve regurgitation. Trielaflet AV with no paravalular abscess or AI,  however there is a small mobile echodensity on the LVOT side of the AV with independent motion whcih could be consitent with a small vegetation vs degenerative changes. Mitral Valve: The mitral valve is normal in structure. There is no evidence of mitral valve regurgitation. Tricuspid Valve: The tricuspid valve is abnormal. There is severe tricuspid regurgitation. The tricuspid valve regurgitant jet is eccentrically directed. Please see RA comments. Pulmonic Valve: The pulmonic valve is structurally normal. There is trace pulmonic valve regurgitation. Pericardium: There is no pericardial effusion noted. Aorta: The aortic root is normal. In comparison to the previous echocardiogram(s): Compared with study dated 7/23/2024, Catheter associated vegetation and anatomy of TV are better visualized on today's study. Otherwise, there are no gross changes from the previous surface echo.  CONCLUSIONS:  1. Left ventricular ejection fraction is moderately decreased, by visual estimate at 40%.  2. There is global hypokinesis of the left ventricle with minor regional variations.  3. Mildly enlarged right ventricle.  4. There is moderately reduced right ventricular systolic function.  5. Agitated saline contrast study was positive for an intracardiac shunt (moderate sized PFO).  6. There is a large vegetation that circumferentially encases the femoral TDC ( catheter). There is also involvement of the TV with likely destruction of the septal leaflet of the TV and resultant severe TR. ( the vegatation surrounding the catheter measures at least 1.9cm x 2.5cm.  7. The tricuspid valve is abnormal.  8. Severe tricuspid regurgitation visualized.  9. Trielaflet AV with no paravalular abscess or AI, however there is a small mobile echodensity on the LVOT side of the AV with independent motion whcih could be consitent with a small vegetation vs degenerative changes. 10. Moderately sized patent foramen ovale. 11. No left atrial thrombus. 12.  MICU team nofitied of findings at the time of study. 13. A bubble study using agitated saline was performed. Bubble study is positive. A moderate PFO (11-20 bubbles) was demonstrated. 14. Compared with study dated 7/23/2024, Catheter associated vegetation and anatomy of TV are better visualized on today's study. Otherwise, there are no gross changes from the previous surface echo. QUANTITATIVE DATA SUMMARY: LV SYSTOLIC FUNCTION BY 2D PLANIMETRY (MOD):                      Normal Ranges: EF-Visual:      40 % LV EF Reported: 40 %  [id number] Penny Shaw MD Electronically signed on 7/24/2024 at 6:31:34 PM  ** Final **     Vascular US Lower Extremity Venous Duplex Right    Result Date: 7/24/2024            [place] [address]   Tel [phone] and Fax [phone]  Vascular Lab Report VASC US LOWER EXTREMITY VENOUS DUPLEX RIGHT  Patient Name:      XIOMARA FERGUSON         Reading Physician:  [id number] Bridger Ervin MD Study Date:        7/24/2024            Ordering Physician: [id number] SNYDER MRN/PID:           [id number]             Technologist:       Antonietta Sierra T Accession#:        [id number]         Technologist 2: Date of Birth/Age: 1985 / 39 years Encounter#:         [id number] Gender:            M Admission Status:  Inpatient            Location Performed: [facility]  Diagnosis/ICD: Other specified soft tissue disorders-M79.89 Indication:    Limb swelling CPT Codes:     80038 Peripheral venous duplex scan for DVT Limited  CONCLUSIONS: Right Lower Venous: Unable to obtain right groin compression or visualize the DEIV, CFV, profunda and proximal FVs due to line placement. Can not rule out thrombus in non-visualized areas. Remainder visualized vessels show no evidence of DVT. Left Lower Venous: There are chronic changes visualized  in the common femoral and proximal femoral veins.  Imaging & Doppler Findings:  Right       Compressible Thrombus        Flow FV Proximal     Yes        None   Spontaneous/Phasic FV Mid          Yes        None FV Distal       Yes        None Popliteal       Yes        None   Spontaneous/Phasic Peroneal        Yes        None PTV             Yes        None  Left        Compress Thrombus CFV         Partial  Chronic FV Proximal Partial  Chronic  [id number] Bridger Ervin MD Electronically signed by [id number] Bridger Ervin MD on 7/24/2024 at 12:41:59 PM  ** Final **     ECG 12 lead    Result Date: 7/24/2024  Normal sinus rhythm Prolonged QT Abnormal ECG When compared with ECG of 22-JUL-2024 10:44, No significant change was found See ED provider note for full interpretation and clinical correlation Confirmed by Kelsie Cherry ([id number]) on 7/24/2024 5:46:20 AM    Transthoracic Echo (TTE) Limited    Result Date: 7/23/2024   [facility], [address] [place] [address]                Tel [phone] and Fax [phone] TRANSTHORACIC ECHOCARDIOGRAM REPORT  Patient Name:      XIOMARA Maya Physician:    [id number] Penny Shaw MD Study Date:        7/23/2024            Ordering Provider:    [id number] DENISHA SONG MRN/PID:           [id number]             Fellow: Accession#:        [id number]         Nurse: Date of Birth/Age: 1985 / 39 years Sonographer:          Reji Bhakta RDCS Gender:            M                    Additional Staff: Height:            175.26 cm            Admit Date:           7/22/2024 Weight:            73.48 kg             Admission Status:     Inpatient -                                                               Routine BSA / BMI:         1.89 m2 / 23.92      Encounter#:            [id number]                    kg/m2 Blood Pressure:    94/44 mmHg           [address] Study Type:    TRANSTHORACIC ECHO (TTE) LIMITED Diagnosis/ICD: Sepsis, unspecified organism-A41.9 Indication:    concern for endocarditis CPT Code:      Echo Limited-70019; Doppler Limited-34826; Color Doppler-29801 Patient History: Pertinent History: HTN, HLD, septic shock, SIRS, tachycardia, PVVD, ESRD, NICM,                    HFrEF. Study Detail: The following Echo studies were performed: M-Mode, 2D, Doppler and               color flow. Technically challenging study due to prominent lung               artifact, body habitus, patient lying in supine position and poor               acoustic windows. Definity used as a contrast agent for               endocardial border definition. Total contrast used for this               procedure was 4.0 mL via IV push.  Critical Event Critical Event: Test was completed as per department protocol. Critical Finding: Possible Mass and or Endocarditis in right atrium/tricuspid valve. Time Test was Completed: 9:49:00 AM Notified: Dr. Shaw. Attending notification time: 9:55:00 AM  PHYSICIAN INTERPRETATION: Left Ventricle: Left ventricular ejection fraction is moderately decreased, calculated by Briseno's biplane at 35%. There is global hypokinesis of the left ventricle with minor regional variations. The left ventricular cavity size is normal. Left ventricular diastolic filling was not assessed. There is no definite left ventricular thrombus visualized. Left Atrium: The left atrium was not assessed. Right Ventricle: The right ventricle is normal in size. There is reduced right ventricular systolic function. Right Atrium: The right atrium was not well visualized. There is a device visualized in the right atrium. Several small mobile echodensities noted within the RA likely attached to dialysis catheter and thickened TV with mobile echo densities associated with the TV  concerning for possible vegetations with severe TR. Not well seen. Recommend POLI to further assess if clinically indicated. Aortic Valve: The aortic valve is trileaflet. There is mild to moderate aortic valve cusp calcification. There is no evidence of aortic valve regurgitation. Mitral Valve: The mitral valve is mildly thickened. There is mild thickening and calcification of the anterior mitral valve leaflet. There is moderate mitral annular calcification. There is trace mitral valve regurgitation. Tricuspid Valve: The tricuspid valve is abnormal. There is severe tricuspid regurgitation. The Doppler estimated RVSP is mildly elevated at 37.1 mmHg. There is reversed systolic hepatic vein flow. TV with likely mobile echodensities associated tih the leaflets though not well seen with severe TR. RVSP may be underestimated due to severity of TR. Pulmonic Valve: The pulmonic valve is structurally normal. There is physiologic pulmonic valve regurgitation. Pericardium: There is a trivial pericardial effusion. Aorta: The aortic root is normal. Systemic Veins: The inferior vena cava was not well visualized. In comparison to the previous echocardiogram(s): Compared with the prior exam POLI from 5/14/2024 ( [facility]) the TV was previously normal with only trivial TR. The dialysis catheter seen within the RA is new since the prior POLI and the mobile echodensities and severe TR are new as well. The LV systolic function was already mild to moderately reduced at that time.  CONCLUSIONS:  1. Left ventricular ejection fraction is moderately decreased, calculated by Briseno's biplane at 35%.  2. There is global hypokinesis of the left ventricle with minor regional variations.  3. No left ventricular thrombus visualized.  4. There is reduced right ventricular systolic function.  5. Several small mobile echodensities noted within the RA likely attached to dialysis catheter and thickened TV with mobile echo densities associated with the TV  concerning for possible vegetations with severe TR. Not well seen. Recommend POLI to further assess if clinically indicated.  6. There is moderate mitral annular calcification.  7. The tricuspid valve is abnormal.  8. Mildly elevated RVSP.  9. Severe tricuspid regurgitation visualized. 10. TV with likely mobile echodensities associated tih the leaflets though not well seen with severe TR. RVSP may be underestimated due to severity of TR. 11. There is reversed systolic hepatic vein flow. 12. Primary service notified of findings at the time of reporting. 13. Compared with the prior exam POLI from 5/14/2024 ( [facility]) the TV was previously normal with only trivial TR. The dialysis catheter seen within the RA is new since the prior POLI and the mobile echodensities and severe TR are new as well. The LV systolic function was already mild to moderately reduced at that time. QUANTITATIVE DATA SUMMARY: 2D MEASUREMENTS:                          Normal Ranges: IVSd:          0.80 cm   (0.6-1.1cm) LVPWd:         0.75 cm   (0.6-1.1cm) LVIDd:         3.95 cm   (3.9-5.9cm) LVIDs:         3.25 cm LV Mass Index: 51.5 g/m2 LV % FS        17.7 % AORTA MEASUREMENTS:                      Normal Ranges: Ao Sinus, d: 3.20 cm (2.1-3.5cm) LV SYSTOLIC FUNCTION BY 2D PLANIMETRY (MOD):                      Normal Ranges: EF-A4C View:    40 % (>=55%) EF-A2C View:    29 % EF-Biplane:     35 % LV EF Reported: 35 %  RIGHT VENTRICLE: TAPSE: 18.9 mm RV s'  0.12 m/s TRICUSPID VALVE/RVSP:                             Normal Ranges: Peak TR Velocity: 2.92 m/s RV Syst Pressure: 37.1 mmHg (< 30mmHg)  [id number] Penny Shaw MD Electronically signed on 7/23/2024 at 10:49:40 AM  ** Final **     Point of Care Ultrasound    Result Date: 7/22/2024  Bridger Olguin DO     7/22/2024  7:19 PM Performed by: Bridger Olguin DO Authorized by: Bridger Olguin DO  Procedure: Cardiac Ultrasound Findings:  Views: parasternal long, parasternal short, apical four and subxiphoid The  pericardial space was visualized and was NEGATIVE for a significant pericardial effusion. Activity: Ventricular contractions were visualized. LV: LV systolic function was DECREASED. Impression: Cardiac: The focused cardiac ultrasound exam had ABNORMAL findings as specified. Comments: Patient IVC extremely collapsible.      XR foot right 3+ views    Result Date: 7/22/2024  Interpreted By:  Yeni Mantilla, STUDY: Right ankle, 3 views. Right foot, 3 views.   INDICATION: Signs/Symptoms:osteo concern chronic wound; Signs/Symptoms:c/f osteomyelitis.   COMPARISON: None.   ACCESSION NUMBER(S): [id number]; [id number]   ORDERING CLINICIAN: ANDRÉS PEARSON   STUDY: Right ankle/foot: There is severe demineralization of the bones which limits evaluation for subtle fractures. Questionable age-indeterminate fractures of the 2nd, 3rd, and 4th metatarsal necks with slight impacted appearance and irregularity. Severe vascular calcifications of the ankle and foot. There is ulceration in the posterior aspect of the calcaneus. No osseous erosion, cortical indistinctness, regional osteopenia, or periosteal reaction to suggest radiographic findings of osteomyelitis. Ankle mortise is normally aligned.       Ulceration in the posterior aspect of the calcaneus without radiographic findings of osteomyelitis.   Severe demineralization of the bones which limits evaluation for subtle fractures.   Questionable age-indeterminate 2nd, 3rd, and 4th metatarsal neck fractures which may be however artifactual. Correlate with history for trauma and point tenderness.   MACRO: None.   Signed by: Yeni Mantilla 7/22/2024 2:54 PM Dictation workstation:   [id number]    XR ankle right 3+ views    Result Date: 7/22/2024  Interpreted By:  Yeni Mantilla, STUDY: Right ankle, 3 views. Right foot, 3 views.   INDICATION: Signs/Symptoms:osteo concern chronic wound; Signs/Symptoms:c/f osteomyelitis.   COMPARISON: None.   ACCESSION NUMBER(S): [id number];  [id number]   ORDERING CLINICIAN: ANDRÉS PEARSON   STUDY: Right ankle/foot: There is severe demineralization of the bones which limits evaluation for subtle fractures. Questionable age-indeterminate fractures of the 2nd, 3rd, and 4th metatarsal necks with slight impacted appearance and irregularity. Severe vascular calcifications of the ankle and foot. There is ulceration in the posterior aspect of the calcaneus. No osseous erosion, cortical indistinctness, regional osteopenia, or periosteal reaction to suggest radiographic findings of osteomyelitis. Ankle mortise is normally aligned.       Ulceration in the posterior aspect of the calcaneus without radiographic findings of osteomyelitis.   Severe demineralization of the bones which limits evaluation for subtle fractures.   Questionable age-indeterminate 2nd, 3rd, and 4th metatarsal neck fractures which may be however artifactual. Correlate with history for trauma and point tenderness.   MACRO: None.   Signed by: Yeni Mantilla 7/22/2024 2:54 PM Dictation workstation:   [id number]    XR chest 1 view    Result Date: 7/22/2024  STUDY: Chest Radiograph;  7/22/2024 10:48AM INDICATION: Hypotension. COMPARISON: 5/28/2024 XR Chest. ACCESSION NUMBER(S): [id number] ORDERING CLINICIAN: LOUIE NORIEGA TECHNIQUE:  Frontal chest was obtained at 11:42 hours. FINDINGS: CARDIOMEDIASTINAL SILHOUETTE: Cardiomediastinal silhouette is normal in size and configuration.  LUNGS: Linear atelectasis in the left base.  Small hazy opacities in the lung bases similar compared to prior.  Trace pleural effusions.  ABDOMEN: No remarkable upper abdominal findings.  BONES: No acute osseous changes.    Linear atelectasis in the left base. Small hazy opacities in the lung bases similar compared to prior. Trace pleural effusions. Signed by Roe Ruiz MD        Assessment and plan:  Edwar Hemphill is a 39 y.o. male PMHx significant for ESRD (MWF via right femoral line), NICM, HFrEF (LVEF 35-40%  on 5/2024 POLI), anemia, DM2, Left AKA, RUE amputation, HTN, PVD, GERD transferred from step down after being MICU for sepsis c/b lar. Patient now stable, completed course of ceftaroline (7/23-8/6); per ID will continue with Vanco for 6-8 weeks (end date 9/13/2024). Sepsis c/b femoral catheter tip vegetation in RA involving TV, inducing severe tricuspid regurg. Additionally, c/b osteomyelitis of left wrist requiring I/D on 7/29/24 by Ortho. R consulted on 8/13 to evaluate if right femoral HD catheter can be exchanged for one with a pigtail or if a tunnelled PICC line could be placed next to it. Current Right common femoral vein tunneled dialysis catheter placed in IR on 5/24/2024. Endovascular consulted and they tentatively planning for vegectomy with Dr. Dennis Monday 7/29. Vegectomy aborted due to to concerns for infected thrombus in the IVC and potential incomplete treatment without removal of the TDC. IR not able to exchange current dialysis line as risk too high that they may not be able to reestablish access. Given poor overall prognosis, GOC discussion was done and patient is now DNR/DNI, patient met and denied hospice on 8/17. Patient undergoes iHD MWF for ESRD, tolerated well day of transfer. Plan moving forward is to finish IV vanc outpatient once plan for LTC administration is in place. Will trial indefinite suppression with Bactrim 1SS daily after vancomycin course per ID. Will need f/up with ID (Dr Doroteo Graham on 9/10).     8/21 Updates:  - Continue warfarin 2mg per vascular medicine and maintain therapeutic range INR 2-3  - Will continue current abx of Vancomycin   - Patient had episodes of hyperglycemia overnight. Counseled patient to not eat candy.  - Discharge plan is to return to [facility] once IV abx is coordinated   - Will discontinue atorvastatin, vitamin D, and quetiapine  - Possible discharge today pending how patient tolerates dialysis     Micro:   7/31 bcx negative  7/29 abscess?  negative  7/29 bcx 4/4 negative  7/27 L wrist synovial fluid positive MRSA  7/27 bcx 1/2 positive MRSA  7/25 bcx 2/4 positive mrsa  7/22 bcx 4/4 positive MRSA    Antibiotics:   Vanc 7/23- present *plan until 9/13  Zosyn on 7/23  Ceftaroline 7/23-8/6  Vanc locks    ###Sepsis 2/2 MRSA  ###Femoral catheter/RA vegetation  ###Left wrist osteomyelitis s/p I/D  :: I/D of left wrist on 7/29/24 by Dr. Allen  :: attempted aspiration of left hand by Ortho on 8/14, unsuccessful, no purulence, likely hematoma  :: Will need indefinite bactrim suppression after vanc per ID  :: pressors weaned, continuing on midodrine  :: 7/29 Bcx negative  Plan:  - c/w vanc 5mg  - organize for dispo: vanc needs to be instilled in femoral catheter after dialysis, make sure CDC is able and aware  - per ortho, suture removal on 8/21  - c/w midodrine 20mg TID  - c/w PRN tylenol, benadryl, zofran  - c/w dilaudid/oxy for breakthrough pain     ###PVD  :: Left AKA  :: RUE amputation  :: Vascular medicine following; continued on heparin drip and transitioned to Coumadin with INR therapeutic x 2 on 8/11 (2.3) and 8/12 (3.0); Heparin discontinued on 8/12.   :: Coumadin dose changed to 2.0mg per vascular medicine  Plan:  - Continue warfarin 2.0 mg and monitor INR for therapeutic range  - c/w atorvastatin 50mg nightly    ###ESRD  Plan:  - iHD MWF     ##HFrEF (LVEF 35-40% on 5/2024 POLI)  ##NICM  ##Anemia  :: baseline Hgb 7-8  :: Hgb 8.0 today  Plan:  - c/w EPO 10,000 units MWF  - Continue to monitor      ##DM2  :: Patient has multiple episodes of hyperglycemia. Counseled patient to stop eating candy throughout the day.  :: He states his appetite is baseline and is eating/drinking well   Plan:  - Adjusted SSI to mild dosing  - Will continue to monitor.     #Constipation  - Discontinue rectal bisacodyl 10mg daily  - miralax BID  - sennosides-docusate sodium, 1 tablet, oral, BID     #Hypothyroidism  - Continue levo 150mg daily     #Preventative  - B  complex-vitamin C-folic acid, 1 capsule, oral, Daily  - Discontinue cholecalciferol, 50,000 Units, oral, Once per day on Monday Thursday  - folic acid, 1 mg, oral, Daily  - thiamine, 200 mg, oral, Daily     #Misc  - melatonin 6mg nightly  - Discontinue QUEtiapine, 12.5 mg, oral, Nightly     F: PRN  E: PRN  N: Adult diet 2-3 grams sodium  A: pIV  DVT ppx: Warfarin 2mg  GI ppx: protonix  CODE STATUS: DNR and No Intubation   NOK: Shanthi Hemphill (Mother) [phone]    Patient and plan discussed with attending physician Dr. Hernandez.    Nikia Simmons MD  PGY-1 Internal Medicine

## 2024-08-22 VITALS
BODY MASS INDEX: 28.28 KG/M2 | SYSTOLIC BLOOD PRESSURE: 117 MMHG | RESPIRATION RATE: 16 BRPM | HEIGHT: 69 IN | WEIGHT: 190.92 LBS | DIASTOLIC BLOOD PRESSURE: 84 MMHG | HEART RATE: 98 BPM | OXYGEN SATURATION: 100 % | TEMPERATURE: 97.5 F

## 2024-08-22 LAB
ABO GROUP (TYPE) IN BLOOD: NORMAL
ALBUMIN SERPL BCP-MCNC: 2.9 G/DL (ref 3.4–5)
ANION GAP SERPL CALC-SCNC: 14 MMOL/L (ref 10–20)
ANTIBODY SCREEN: NORMAL
ATRIAL RATE: 91 BPM
BUN SERPL-MCNC: 21 MG/DL (ref 6–23)
CALCIUM SERPL-MCNC: 9.3 MG/DL (ref 8.6–10.6)
CHLORIDE SERPL-SCNC: 100 MMOL/L (ref 98–107)
CO2 SERPL-SCNC: 27 MMOL/L (ref 21–32)
CREAT SERPL-MCNC: 4.58 MG/DL (ref 0.5–1.3)
EGFRCR SERPLBLD CKD-EPI 2021: 16 ML/MIN/1.73M*2
ERYTHROCYTE [DISTWIDTH] IN BLOOD BY AUTOMATED COUNT: 19.9 % (ref 11.5–14.5)
GLUCOSE BLD MANUAL STRIP-MCNC: 194 MG/DL (ref 74–99)
GLUCOSE BLD MANUAL STRIP-MCNC: 223 MG/DL (ref 74–99)
GLUCOSE SERPL-MCNC: 203 MG/DL (ref 74–99)
HCT VFR BLD AUTO: 29.6 % (ref 41–52)
HGB BLD-MCNC: 9.2 G/DL (ref 13.5–17.5)
INR PPP: 1.9 (ref 0.9–1.1)
MAGNESIUM SERPL-MCNC: 2.01 MG/DL (ref 1.6–2.4)
MCH RBC QN AUTO: 28.8 PG (ref 26–34)
MCHC RBC AUTO-ENTMCNC: 31.1 G/DL (ref 32–36)
MCV RBC AUTO: 93 FL (ref 80–100)
NRBC BLD-RTO: 0 /100 WBCS (ref 0–0)
P AXIS: 21 DEGREES
P OFFSET: 193 MS
P ONSET: 161 MS
PHOSPHATE SERPL-MCNC: 3.6 MG/DL (ref 2.5–4.9)
PLATELET # BLD AUTO: 118 X10*3/UL (ref 150–450)
POTASSIUM SERPL-SCNC: 4.8 MMOL/L (ref 3.5–5.3)
PR INTERVAL: 122 MS
PROTHROMBIN TIME: 21.7 SECONDS (ref 9.8–12.8)
Q ONSET: 222 MS
QRS COUNT: 14 BEATS
QRS DURATION: 68 MS
QT INTERVAL: 358 MS
QTC CALCULATION(BAZETT): 440 MS
QTC FREDERICIA: 411 MS
R AXIS: -7 DEGREES
RBC # BLD AUTO: 3.19 X10*6/UL (ref 4.5–5.9)
RH FACTOR (ANTIGEN D): NORMAL
SODIUM SERPL-SCNC: 136 MMOL/L (ref 136–145)
T AXIS: 66 DEGREES
T OFFSET: 401 MS
VENTRICULAR RATE: 91 BPM
WBC # BLD AUTO: 6.7 X10*3/UL (ref 4.4–11.3)

## 2024-08-22 PROCEDURE — 2500000001 HC RX 250 WO HCPCS SELF ADMINISTERED DRUGS (ALT 637 FOR MEDICARE OP)

## 2024-08-22 PROCEDURE — 2500000001 HC RX 250 WO HCPCS SELF ADMINISTERED DRUGS (ALT 637 FOR MEDICARE OP): Performed by: NURSE PRACTITIONER

## 2024-08-22 PROCEDURE — 82947 ASSAY GLUCOSE BLOOD QUANT: CPT

## 2024-08-22 PROCEDURE — 80069 RENAL FUNCTION PANEL: CPT

## 2024-08-22 PROCEDURE — 99239 HOSP IP/OBS DSCHRG MGMT >30: CPT

## 2024-08-22 PROCEDURE — 85027 COMPLETE CBC AUTOMATED: CPT

## 2024-08-22 PROCEDURE — 36415 COLL VENOUS BLD VENIPUNCTURE: CPT

## 2024-08-22 PROCEDURE — 99233 SBSQ HOSP IP/OBS HIGH 50: CPT | Performed by: NURSE PRACTITIONER

## 2024-08-22 PROCEDURE — 83735 ASSAY OF MAGNESIUM: CPT

## 2024-08-22 PROCEDURE — 85610 PROTHROMBIN TIME: CPT

## 2024-08-22 PROCEDURE — 2500000004 HC RX 250 GENERAL PHARMACY W/ HCPCS (ALT 636 FOR OP/ED): Performed by: INTERNAL MEDICINE

## 2024-08-22 PROCEDURE — 2500000002 HC RX 250 W HCPCS SELF ADMINISTERED DRUGS (ALT 637 FOR MEDICARE OP, ALT 636 FOR OP/ED)

## 2024-08-22 PROCEDURE — 86901 BLOOD TYPING SEROLOGIC RH(D): CPT

## 2024-08-22 RX ORDER — WARFARIN 2.5 MG/1
TABLET ORAL
Start: 2024-08-22 | End: 2025-08-22

## 2024-08-22 RX ORDER — WARFARIN 2.5 MG/1
2.5 TABLET ORAL DAILY
Status: DISCONTINUED | OUTPATIENT
Start: 2024-08-22 | End: 2024-08-22 | Stop reason: HOSPADM

## 2024-08-22 ASSESSMENT — COGNITIVE AND FUNCTIONAL STATUS - GENERAL
PERSONAL GROOMING: A LOT
TURNING FROM BACK TO SIDE WHILE IN FLAT BAD: TOTAL
STANDING UP FROM CHAIR USING ARMS: TOTAL
HELP NEEDED FOR BATHING: A LOT
WALKING IN HOSPITAL ROOM: TOTAL
DRESSING REGULAR UPPER BODY CLOTHING: TOTAL
MOBILITY SCORE: 6
DRESSING REGULAR LOWER BODY CLOTHING: TOTAL
MOVING TO AND FROM BED TO CHAIR: TOTAL
CLIMB 3 TO 5 STEPS WITH RAILING: TOTAL
DAILY ACTIVITIY SCORE: 11
TOILETING: A LOT
MOVING FROM LYING ON BACK TO SITTING ON SIDE OF FLAT BED WITH BEDRAILS: TOTAL
EATING MEALS: A LITTLE

## 2024-08-22 ASSESSMENT — PAIN - FUNCTIONAL ASSESSMENT: PAIN_FUNCTIONAL_ASSESSMENT: 0-10

## 2024-08-22 ASSESSMENT — PAIN SCALES - GENERAL: PAINLEVEL_OUTOF10: 3

## 2024-08-22 ASSESSMENT — PAIN SCALES - PAIN ASSESSMENT IN ADVANCED DEMENTIA (PAINAD): TOTALSCORE: REPOSITIONED

## 2024-08-22 NOTE — PROGRESS NOTES
Subjective     Interval events:  No acute events overnight. Patient seen at bedside this AM. He is resting comfortably in bed and denies any acute complaints. States he is eating and drinking well, appetite is at his baseline. He continues to have regular bowel movements and no concern for diarrhea. He denies any fevers, chills, nausea/vomitting, chest pain, shortness of breath, abdominal pain, dizziness, or lightheadedness. He is looking forward to being discharged today.    Objective     Vitals:  Vitals:    08/22/24 0520   BP: 106/74   Pulse: 93   Resp: 18   Temp: 37 °C (98.6 °F)   SpO2: 94%       I/O last 3 completed shifts:  In: 1600 (22.6 mL/kg) [I.V.:800 (11.3 mL/kg); Other:800]  Out: 4800 (67.7 mL/kg) [Other:4800]  Dosing Weight: 70.9 kg   No intake/output data recorded.    Physical exam:  Constitutional: well appearing pt in NAD, alert and cooperative  Eyes: PERRL, EOMI, no icterus   ENMT: mucous membranes moist, no apparent injury, no lesions seen  Head/Neck: Neck supple, no apparent injury  Respiratory/Thorax: Lungs CTA bilaterally, non-labored breathing, no cough, on RA  Cardiovascular: Regular, rate and rhythm, no murmurs, normal S1 and S2  Gastrointestinal: Nondistended, soft, non-tender, BS hypoactive x 4  Musculoskeletal: RUE amputation, L aka, Left wrist surgical site wrapped  Extremities: +3 edema RLE  Neurological: alert and oriented x 3, speech clear, follows commands appropriately  Skin: Warm and dry/flaky, right fem dialysis line    Medications:  B complex-vitamin C-folic acid, 1 capsule, oral, Daily  epoetin tyson or biosimilar, 12,000 Units, intravenous, Once per day on Monday Wednesday Friday  folic acid, 1 mg, oral, Daily  insulin lispro, 0-5 Units, subcutaneous, TID  levothyroxine, 150 mcg, oral, Daily before breakfast  melatonin, 6 mg, oral, Nightly  midodrine, 20 mg, oral, TID  nystatin, 1 Application, Topical, BID  pantoprazole, 40 mg, oral, BID AC  [START ON 9/6/2024] pantoprazole, 40  mg, oral, Daily before breakfast  polyethylene glycol, 17 g, oral, BID  sennosides-docusate sodium, 1 tablet, oral, BID  thiamine, 200 mg, oral, Daily  vancomycin 5 mg/mL + heparin 2500 units/mL in NS lock, 2.4 mL, intra-catheter, After Dialysis  vancomycin 5 mg/mL + heparin 2500 units/mL in NS lock, 2.4 mL, intra-catheter, q48h  warfarin, 2 mg, oral, Daily         PRN medications: acetaminophen **OR** [DISCONTINUED] acetaminophen **OR** [DISCONTINUED] acetaminophen, alteplase, bisacodyl, dextrose, dextrose, diphenhydrAMINE, glucagon, glucagon, ondansetron, oxyCODONE, vancomycin    Labs:  Results for orders placed or performed during the hospital encounter of 07/22/24 (from the past 24 hour(s))   POCT GLUCOSE   Result Value Ref Range    POCT Glucose 355 (H) 74 - 99 mg/dL   POCT GLUCOSE   Result Value Ref Range    POCT Glucose 362 (H) 74 - 99 mg/dL   POCT GLUCOSE   Result Value Ref Range    POCT Glucose 375 (H) 74 - 99 mg/dL   Protime-INR   Result Value Ref Range    Protime 23.3 (H) 9.8 - 12.8 seconds    INR 2.1 (H) 0.9 - 1.1   Vancomycin   Result Value Ref Range    Vancomycin 18.8 5.0 - 20.0 ug/mL   CBC   Result Value Ref Range    WBC 7.0 4.4 - 11.3 x10*3/uL    nRBC 0.0 0.0 - 0.0 /100 WBCs    RBC 3.17 (L) 4.50 - 5.90 x10*6/uL    Hemoglobin 9.2 (L) 13.5 - 17.5 g/dL    Hematocrit 29.8 (L) 41.0 - 52.0 %    MCV 94 80 - 100 fL    MCH 29.0 26.0 - 34.0 pg    MCHC 30.9 (L) 32.0 - 36.0 g/dL    RDW 19.4 (H) 11.5 - 14.5 %    Platelets 128 (L) 150 - 450 x10*3/uL   Magnesium   Result Value Ref Range    Magnesium 1.95 1.60 - 2.40 mg/dL   Renal function panel   Result Value Ref Range    Glucose 316 (H) 74 - 99 mg/dL    Sodium 135 (L) 136 - 145 mmol/L    Potassium 5.6 (H) 3.5 - 5.3 mmol/L    Chloride 99 98 - 107 mmol/L    Bicarbonate 27 21 - 32 mmol/L    Anion Gap 15 10 - 20 mmol/L    Urea Nitrogen 34 (H) 6 - 23 mg/dL    Creatinine 6.58 (H) 0.50 - 1.30 mg/dL    eGFR 10 (L) >60 mL/min/1.73m*2    Calcium 8.8 8.6 - 10.6 mg/dL     Phosphorus 4.9 2.5 - 4.9 mg/dL    Albumin 2.7 (L) 3.4 - 5.0 g/dL   Type and Screen   Result Value Ref Range    ABO TYPE O     Rh TYPE POS     ANTIBODY SCREEN NEG    POCT GLUCOSE   Result Value Ref Range    POCT Glucose 226 (H) 74 - 99 mg/dL   POCT GLUCOSE   Result Value Ref Range    POCT Glucose 194 (H) 74 - 99 mg/dL       Imaging:  Electrocardiogram, 12-lead PRN ACS symptoms    Result Date: 8/17/2024  Normal sinus rhythm Low voltage QRS Nonspecific T wave abnormality Abnormal ECG When compared with ECG of 14-AUG-2024 11:07, (unconfirmed) No significant change was found Confirmed by Mario Carroll ([id number]) on 8/17/2024 11:42:10 AM    MR brain wo IV contrast    Result Date: 8/12/2024  Interpreted By:  Gerardo Calle, STUDY: MR BRAIN WO IV CONTRAST;  8/11/2024 2:55 pm   INDICATION: Signs/Symptoms:rule out mycotic aneurysm.   COMPARISON: 08/06/2024   ACCESSION NUMBER(S): [id number]   ORDERING CLINICIAN: KYM COSTA   TECHNIQUE: Axial diffusion, axial T2, axial FLAIR, axial gradient echo T2, coronal T1, and sagittal T1 weighted MRI images of the brain were obtained without intravenous contrast administration.   FINDINGS: The diffusion weighted images fail to demonstrate evidence of abnormal diffusion restriction to suggest acute infarction.   There is again evidence of mild-to-moderate brain parenchymal volume loss.   Mild nonspecific white matter changes are noted within cerebral hemispheres bilaterally. While nonspecific, white matter changes can be seen with small-vessel ischemic change or demyelinating processes among others.   There is no midline shift. The suprasellar/basilar cisterns are patent.   There are small retention cysts or polyps noted within the inferior maxillary sinuses bilaterally.   There is opacification of a few right mastoid air cells.       There is no MRI evidence of acute infarction on the diffusion weighted images.   There is again evidence of mild-to-moderate brain parenchymal volume  loss.   Mild nonspecific white matter changes are noted within cerebral hemispheres bilaterally. While nonspecific, white matter changes can be seen with small-vessel ischemic change or demyelinating processes among others.   MACRO: None.   Signed by: Gerardo Calle 8/12/2024 6:45 AM Dictation workstation:   [id number]    Electrocardiogram, 12-lead PRN ACS symptoms    Result Date: 8/8/2024  Normal sinus rhythm Low voltage QRS Borderline ECG When compared with ECG of 23-JUL-2024 10:15, Borderline criteria for Anterior infarct are no longer Present ST no longer depressed in Anterior leads QT has shortened Confirmed by Mario Carroll ([id number]) on 8/8/2024 9:13:53 PM    XR abdomen 1 view    Result Date: 8/7/2024  Interpreted By:  Koffi Stroud, STUDY: XR ABDOMEN 1 VIEW; 8/7/2024 9:12 am   INDICATION: Signs/Symptoms:Vomiting.   COMPARISON: 01/17/2024.   ACCESSION NUMBER(S): [id number]   ORDERING CLINICIAN: UMAIR VALDEZ   FINDINGS: There is a right femoral venous catheter with tip overlying the right atrium. Moderate amount of stool in nondistended loops of bowel. Correlate with a component of fecal impaction. Limited evaluation of pneumoperitoneum on supine imaging, however no gross evidence of free air is noted.   Left-sided pleural effusion. There is extensive vascular calcifications.   Osseous structures demonstrate no acute bony changes.       1.  No gross bowel distention. Moderate amount of stool within the colon and correlate with a component of fecal impaction.   Signed by: Koffi Stroud 8/7/2024 10:42 AM Dictation workstation:   [id number]    CT head wo IV contrast    Result Date: 8/6/2024  Interpreted By:  Nakul Wang, STUDY: CT HEAD WO IV CONTRAST;  8/6/2024 4:32 pm   INDICATION: Signs/Symptoms:c/f emboli iso large PFO.   COMPARISON: None.   ACCESSION NUMBER(S): [id number]   ORDERING CLINICIAN: ANGELIKA SELLERS   TECHNIQUE: Noncontrast enhanced CT was performed from the skullbase to the vertex.    FINDINGS: There is minimal prominence of the cortical sulci and sylvian fissures without hydrocephalus. There is no evidence of intracranial mass or extra-axial collection. The skull base, paranasal sinuses and orbital structures are normal. The calvarium is normal.       Minimal cortical volume loss without hydrocephalus, hemorrhage or extra-axial collection.   MACRO: none   Signed by: Nakul Wang 8/6/2024 4:43 PM Dictation workstation:   [id number]    Transthoracic Echo (TTE) Limited    Result Date: 8/2/2024   [facility], [address] [place] [address]                Tel [phone] and Fax [phone] TRANSTHORACIC ECHOCARDIOGRAM REPORT  Patient Name:      XIOMARA Maya Physician:    [id number] Zayda Richmond MD Study Date:        8/2/2024             Ordering Provider:    [id number] MARK MALLOY MRN/PID:           [id number]             Fellow: Accession#:        [id number]         Nurse: Date of Birth/Age: 1985 / 39 years Sonographer:          Reji Bhakta RDCS Gender:            M                    Additional Staff: Height:            175.26 cm            Admit Date: Weight:            75.30 kg             Admission Status:     Inpatient -                                                               Routine BSA / BMI:         1.91 m2 / 24.51      Encounter#:           [id number]                    kg/m2 Blood Pressure:    103/66 mmHg          Department Location:  [address] Study Type:    TRANSTHORACIC ECHO (TTE) LIMITED Diagnosis/ICD: Acute and subacute infective endocarditis-I33.0 Indication:    hypotension, R heart strain CPT Code:      Echo Limited-69231; Doppler Limited-94171; Color Doppler-30474 Patient History: Pertinent History: ESRD on HD (right femoral line),  HFrEF T2DM, PVD (s/p left                    AKA and right forearm amputation), multiple line-related DVT                    (RIJ, LIJ, RUE, LUE, RLE, LLE,TV infective endocarditis. Study Detail: The following Echo studies were performed: 2D, M-Mode, Doppler and               color flow. Technically challenging study due to body habitus,               patient lying in supine position, poor acoustic windows and               prominent lung artifact. Definity used as a contrast agent for               endocardial border definition and agitated saline used as a               contrast agent for intraseptal flow evaluation. Total contrast               used for this procedure was 2.0 mL via IV push.  PHYSICIAN INTERPRETATION: Left Ventricle: Left ventricular ejection fraction is mildly decreased, calculated by Briseno's biplane at 43%. There are no regional wall motion abnormalities. The left ventricular cavity size is normal. The interventricular septum is flattened in diastole ('D' shaped left ventricle), consistent with right ventricular volume overload. Left ventricular diastolic filling was not assessed. Left Atrium: The left atrium is normal in size. A bubble study using agitated saline was performed. Bubble study is positive. A large PFO (> 20 bubbles) was demonstrated. Right Ventricle: The right ventricle is moderately enlarged. There is normal right ventricular global systolic function. Right Atrium: The right atrium is moderately dilated. A large mass is seen the RA (2.4x2.6cm) seems like attached to a catheder (?) projecting from IVC/SVC (difficult to differentiate). A smilar mass was also noted in the POLI from 7/24/2024. Aortic Valve: The aortic valve appears structurally normal. There is mild aortic valve cusp calcification. There is trivial aortic valve regurgitation. Mitral Valve: The mitral valve is mildly thickened. There is mild to moderate mitral annular calcification. There is trace mitral valve  regurgitation. Tricuspid Valve: The tricuspid valve is structurally normal. There is severe tricuspid regurgitation. The Doppler estimated RVSP is moderately elevated at 46.4 mmHg. RVSP is underestimated in the presence of severe TR and a triangular jet. Pulmonic Valve: The pulmonic valve is structurally normal. There is physiologic pulmonic valve regurgitation. Pericardium: There is a trivial pericardial effusion. Aorta: The aortic root is normal. Systemic Veins: The inferior vena cava appears to be of normal size. There is IVC inspiratory collapse greater than 50%. The hepatic vein shows a pattern of systolic flow reversal, suggestive of severe tricuspid regurgitation. In comparison to the previous echocardiogram(s): Compared with study dated 7/24/2024, No significant change.  CONCLUSIONS:  1. Left ventricular ejection fraction is mildly decreased, calculated by Briseno's biplane at 43%.  2. Right ventricular volume overload.  3. Moderately enlarged right ventricle.  4. There is normal right ventricular global systolic function.  5. A large mass is seen the RA (2.4x2.6cm) seems like attached to a catheder (?) projecting from IVC/SVC (difficult to differentiate). A smilar mass was also noted in the POLI from 7/24/2024.  6. The right atrium is moderately dilated.  7. Moderately elevated right ventricular systolic pressure.  8. RVSP is underestimated in the presence of severe TR and a triangular jet.  9. Severe tricuspid regurgitation visualized. 10. A bubble study using agitated saline was performed. Bubble study is positive. A large PFO (> 20 bubbles) was demonstrated. 11. Compared with study dated 7/24/2024, No significant change. QUANTITATIVE DATA SUMMARY: 2D MEASUREMENTS:                          Normal Ranges: IVSd:          0.90 cm   (0.6-1.1cm) LVPWd:         0.80 cm   (0.6-1.1cm) LVIDd:         4.10 cm   (3.9-5.9cm) LVIDs:         2.90 cm LV Mass Index: 55.3 g/m2 LV % FS        29.3 % RA VOLUME BY A/L METHOD:                        Normal Ranges: RA Area A4C: 18.0 cm2 LV SYSTOLIC FUNCTION BY 2D PLANIMETRY (MOD):                      Normal Ranges: EF-A4C View:    38 % (>=55%) EF-A2C View:    46 % EF-Biplane:     43 % LV EF Reported: 43 % AORTIC VALVE:                         Normal Ranges: LVOT Max Nilesh:  0.80 m/s (<=1.1m/s) LVOT VTI:      13.30 cm LVOT Diameter: 1.80 cm  (1.8-2.4cm)  RIGHT VENTRICLE: RV Basal 4.90 cm TAPSE:   16.1 mm RV s'    0.09 m/s TRICUSPID VALVE/RVSP:                             Normal Ranges: Peak TR Velocity: 2.57 m/s RV Syst Pressure: 46.4 mmHg (< 30mmHg)  [id number] Zayda Richmond MD Electronically signed on 8/2/2024 at 11:40:55 AM  ** Final **     XR chest 1 view    Result Date: 7/31/2024  Interpreted By:  Eugene Romero and Ritchie Brandon STUDY: XR CHEST 1 VIEW;  7/31/2024 12:01 am   INDICATION: Signs/Symptoms:pulmonary edema.   COMPARISON: Chest x-ray 07/22/2024.   ACCESSION NUMBER(S): [id number]   ORDERING CLINICIAN: MARK MALLOY   FINDINGS: AP radiograph of the chest was provided.   CARDIOMEDIASTINAL SILHOUETTE: Cardiomediastinal silhouette is normal in size and configuration.   LUNGS: Mildly increased perihilar/interstitial markings compared to prior examination and hazy bibasilar airspace opacities. Unchanged linear atelectasis of the left lung base. There is blunting of the bilateral costophrenic angles consistent with small pleural effusions. No evidence of pneumothorax.   ABDOMEN: No remarkable upper abdominal findings.   BONES: No acute osseous changes.       1.  Mildly increased perihilar/interstitial lung markings with hazy bibasilar airspace opacities. Recommend clinical correlation as findings could reflect developing interstitial/pulmonary edema. Underlying infectious process can not definitively be excluded. 2. Persistent small bilateral pleural effusions and unchanged linear atelectasis of the left lung base.   I personally reviewed the images/study and I agree with the  findings as stated by resident Neftaly Harkins. This study was interpreted at [facility], [place], [place].   MACRO: None   Signed by: Eugene Romero 7/31/2024 8:44 AM Dictation workstation:   [id number]    Cardiac Catheterization Procedure    Result Date: 7/30/2024   [facility], Cath Lab, [address] [place] [address] Cardiovascular Catheterization Report Patient Name:     XIOMARA FERGUSON        Performing Physician:  [id number] Dennis MD Study Date:       7/29/2024           Verifying Physician:   [id number] Dennis MD MRN/PID:          [id number]            Cardiologist/Co-scrub: Accession#:       [id number]        Ordering Physician:    [id number] CASTILLO Date of           1985 / 39      Fellow:                [id number] Castillo Birth/Age:        years                                      MD Gender:           M                   Fellow: Encounter#:       [id number]  Study: IVC venogram  Procedure Description: After infiltration of local anesthetic, the left femoral vein was identified with two dimensional ultrasound. Under direct ultrasound visualization, the left femoral vein was cannulated with a micropuncture technique. A 8 F sheath was placed in the vein. Post-procedure, the venous sheath was pulled and pressure was applied to the site. Following routine access via the left CFV, we had difficulty advancing the wholey wire into the high IVC, therefore we obtained an angiogram of the IVC via the left CFV sheath. There was severe narrowing of the intrahepatic IVC, possibly full with infected thrombus. Procedure was aborted.  Hemo Personnel: +----------------+---------+ Name            Duty      +----------------+---------+ Grant Dennis MD 1  +----------------+---------+  Cardiac Cath Post Procedure Notes: Post Procedure Diagnosis: See below. Blood Loss:               Estimated blood loss during the procedure was 5 mls. Specimens Removed:        Number of specimen(s) removed: none. ____________________________________________________________________________________ CONCLUSIONS:  1. Patient has right groin tunneled dialysis catheter [TDC] extending into the IVC_RA junction with near occlusion of his intrahepatic IVC.  2. Planned procedure of vegetation removal from the tip of the TDC was aborted.  3. We could have possibly performed the vegetectomy via the right internal jugular approach; however, I am concerned that the intrahepatic IVC occlusion represents an infected thrombus, and therefore removing the vegetation from the tip of the TDC by itself would be incomplete treatment and may not help with clearing his MRSA bacteremia. Furthermore, pursuing an invasive treatment of the above would likely necessitate the removal of the TDC and patient has very limited options for alternative sites. Additionally, anticoagulation for such procedure would be challenging as patient has thrombocytopenia and recent GI bleed.  4. Plan to review the case and discuss in details with Dr. Hunter. ICD 10 Codes: Acute and subacute infective endocarditis-I33.0  CPT Codes: Moderate Sedation Services 1st additional 15 minutes patient >5 years-69164; Ultrasound guidance for vascular access-68499; Venogram, IVC-45759  [id number] Grant Dennis MD Performing Physician Electronically signed by [id number] Grant Dennis MD on 7/30/2024 at 8:47:05 AM  ** Final **     XR wrist left 1-2 views    Result Date: 7/29/2024  Interpreted By:  Zach Ordonez  and Janki Ogden STUDY: XR WRIST LEFT 1-2 VIEWS; ;  7/29/2024 10:57 am   INDICATION: Signs/Symptoms:pre-aspiration.   COMPARISON: None.   ACCESSION NUMBER(S): [id number]   ORDERING CLINICIAN: MARK MALLOY   FINDINGS: 2 views of left wrist  No acute fracture or malalignment. Severe vascular calcifications visualized. Osteoporotic changes of the radius and ulna. stable mild soft tissue swelling. Vascular calcifications present as well       No osseous abnormality seen. Soft tissue edema.     I personally reviewed the images/study and I agree with the findings as stated by Alin Shearer MD, PGY-2 this study was interpreted at [facility], [place], [place].   MACRO: None   Signed by: Zach Ordonez 7/29/2024 1:00 PM Dictation workstation:   [id number]    XR wrist left 1-2 views    Result Date: 7/28/2024  Interpreted By:  Yeni Mantilla,  and Analia Hoff STUDY: Left wrist, three views   INDICATION: Signs/Symptoms:suspected septic arthritis, planning joint aspiration.   COMPARISON: None.   ACCESSION NUMBER(S): [id number]   ORDERING CLINICIAN: MARK MALLOY   FINDINGS: No acute fracture or malalignment. Mild 1st CMC and triscaphe joint degenerative changes with osteophytes. Prominent vascular calcifications of the volar wrist and forearm. No soft tissue gas. No retained radiopaque foreign body. Mild nonspecific wrist soft tissue swelling       1. Mild nonspecific wrist soft tissue swelling. No soft tissue gas 2. Mild 1st CMC and triscaphe joint osteoarthrosis.     I personally reviewed the image(s)/study and resident interpretation. I agree with the findings as stated by resident uLis Eduardo Helms. Data analyzed and images interpreted at [facility], [place], [place].   MACRO: None   Signed by: Yeni Mantilla 7/28/2024 1:27 PM Dictation workstation:   [id number]    MR wrist left w and wo IV contrast    Result Date: 7/27/2024  Interpreted By:  Zach Ordonez,  and Thien Francois STUDY: MRI of the  left wrist without and with IV contrast;  7/26/2024 2:55 pm   INDICATION: Signs/Symptoms:eval for left wrist osteomyelitis.   COMPARISON: None.   ACCESSION NUMBER(S): [id number]    ORDERING CLINICIAN: AZEEM SNYDER   TECHNIQUE: MR imaging of the  left wrist was obtained  without and with administration of 15 mL of intravenous Dotarem contrast.   FINDINGS: TENDONS: The extensor tendons are intact. The flexor tendons are intact. There is no tenosynovitis.   LIGAMENTS: The scapholunate ligament is intact. The lunotriquetral ligament is intact. The triangular fibrocartilage complex is intact.   JOINTS: There is no dislocation or subluxation. There is a moderate volume effusion with associated enhancement in the radiocarpal and midcarpal joints.   OSSEOUS STRUCTURES: The bone marrow signal is normal. There is no fracture or contusion. There is no marrow replacing lesion. There is no abnormal enhancement.   SOFT TISSUES: There is nonspecific T2 hyperintense edema and enhancement of the subcutaneous soft tissues surrounding the wrist. There is also nonspecific T2 hyperintense feathery edema of the thenar muscles.   The median nerve in the carpal tunnel is unremarkable. The ulnar nerve in Guyon's canal is unremarkable.       1. No abnormal T1 marrow signal or enhancement to suggest osteomyelitis. 2. Moderate-sized enhancing radiocarpal and midcarpal joint effusion. Reactive, inflammatory and septic arthritis is in the differential. Given clinical history of prior septic arthritis, joint fluid aspiration may be of value to exclude underlying infection. 3. Nonspecific edema and enhancement of the thenar muscles may represent infectious myositis in the appropriate clinical setting.       I personally reviewed the images/study and I agree with the findings as stated. This study was interpreted at [place], [place].   MACRO: None   Signed by: Zach Ordonez 7/27/2024 8:05 AM Dictation workstation:   [id number]    Electrocardiogram, 12-lead PRN ACS symptoms    Result Date: 7/25/2024  Normal sinus rhythm Possible Anterior infarct , age undetermined  Abnormal ECG When compared with ECG of 22-JUL-2024 11:06, Borderline criteria for Anterior infarct are now Present Nonspecific T wave abnormality no longer evident in Inferior leads Confirmed by Mario Carroll ([id number]) on 7/25/2024 10:27:18 PM    CT chest abdomen pelvis w IV contrast    Result Date: 7/25/2024  Interpreted By:  Ivan Alfredo  and Adrian Alexander STUDY: CT CHEST ABDOMEN PELVIS W IV CONTRAST;  7/25/2024 4:36 pm   INDICATION:   Signs/Symptoms:Please perform delayed phase CT to assess SVC, IVC, abdominal and pelvic vasculature Per EMR, 39-year-old male with extensive past medical history currently admitted to the MICU for septic shock secondary to right groin tunneled HD line. He had a TTE 7/23 which showed visitations along the TV with severe TR.   COMPARISON: CT chest abdomen and pelvis 01/14/2024, CT thoracic and lumbar spine 04/30/2024   ACCESSION NUMBER(S): [id number]   ORDERING CLINICIAN: AZEEM SNYDER   TECHNIQUE: CT of the chest, abdomen, and pelvis was performed.  Contiguous axial images were obtained at 3 mm slice thickness through the chest, abdomen and pelvis. Coronal and sagittal reconstructions at 3 mm slice thickness were performed. 75 ml of contrast material Omnipaque 350 were administered intravenously without immediate complication.   FINDINGS:   CHEST:   LUNG/PLEURA/LARGE AIRWAYS: There are multiple new bilateral pulmonary nodules several of them showing cavitation. For example a left upper lobe cavitary lesion with soft tissue component measures  1.7 x 1.5 cm (series 3, image 144). A right middle lobe cavitary lesion measures 8 mm (series 3, image 183). There also few ground-glass nodules in both lungs (annotated on PACS. There is mild-moderate bilateral pleural effusions (series 2, image 69) with adjacent lung atelectasis. Central airways are patent.   VESSELS: Aorta and pulmonary arteries are normal caliber.  Mild atherosclerotic changes are noted of the aorta and  branching vessels. Moderate coronary artery calcifications are present. There is a catheter coursing through the inferior cavoatrial junction with its tip terminating within the right ventricle. There is 2.2 x 1.5 cm hypodense thrombus in the right atrium around the catheter. This likely correlates with a vegetation seen on the prior echocardiogram. There is severe attenuation of the superior vena cava, bilateral brachiocephalic veins with the demonstration of calcifications. There is also poor visualization of the bilateral subclavian and internal jugular veins. The azygos venous system is prominent. There is extensive intercostal collateralization (series 2 images 39 through 78).   HEART: Heart is normal in size. Trace pericardial effusion.   MEDIASTINUM AND JOSEF: Redemonstration of several enlarged mediastinal lymph nodes, for example a 1.4 cm left superior mediastinal node visualized on series 2, image 19, and a 1.9 cm paratracheal node visualized on series 2, image 24. The esophagus is normal.   CHEST WALL AND LOWER NECK: Redemonstration of mild bilateral gynecomastia. The visualized thyroid gland appears within normal limits.   ABDOMEN:   LIVER: The liver is normal in size without evidence of focal liver lesions. Decreased enhancement of the liver is likely secondary to contrast timing. Hepatic veins are unopacified..   BILE DUCTS: The intrahepatic and extrahepatic ducts are not dilated.   GALLBLADDER: No calcified stones. No wall thickening.   PANCREAS: The pancreas appears unremarkable.   SPLEEN: The spleen is normal in size without focal lesions.   ADRENAL GLANDS: Bilateral adrenal glands appear normal.   KIDNEYS AND URETERS: Bilateral kidneys are atrophied with poor enhancement. Bilateral hypodense lesions likely correlate to benign renal cysts. Re-demonstration of vascular calcifications. No hydronephrosis.   PELVIS:   BLADDER: Urinary bladder is decompressed, limited for evaluation.   REPRODUCTIVE  ORGANS: No pelvic masses.   BOWEL: The stomach is unremarkable. Small bowel loops are normal in caliber. There is diffuse circumferential thickening of the entire colon and rectum with the surrounding stranding likely representing proctocolitis. No evidence of pneumatosis or pneumoperitoneum. Appendix appears normal   VESSELS: There is a tunneled right femoral vein approach central venous catheter coursing through the inferior vena cava with the tip terminating within the right ventricle. The inferior vena cava is stenosed/diminutive in appearance with multiple calcifications. Multiple calcific foci are also noted in the bilateral iliac and common femoral veins. There is extensive venous collateralization present. There is no aneurysmal dilatation of the abdominal aorta. There is severe atherosclerotic calcification of the infrarenal abdominal aorta and associated iliofemoral vasculature.   PERITONEUM/RETROPERITONEUM/LYMPH NODES: There is no free or loculated fluid collection, no free intraperitoneal air. There is mild diffuse mesenteric haziness likely secondary to fluid overload. The retroperitoneum appears normal.  No abdominopelvic lymphadenopathy is present.   BONE AND SOFT TISSUE: There is a new T10 vertebral body compression fracture as evidenced on series 602 image 118. Re-demonstration of severe osseous changes, likely secondary to renal osteodystrophy. There is a extensive subcutaneous edema throughout the chest and abdominal wall consistent with the anasarca.       CHEST: 1. Interval development of bilateral pulmonary nodular opacities several of them showing cavitation. Findings are concerning for septic embolism/cavitary pneumonia. 2. Bilateral pleural effusions. 3. Right femoral central venous catheter with tip terminating in the right ventricle. Hypodense thrombus around the catheter in the right atrium correlating with the vegetation seen on prior echocardiogram. 4. Severely attenuated superior vena  cava, bilateral brachiocephalic, subclavian and internal jugular veins with the calcifications in the brachiocephalic veins with extensive collaterals in the chest wall likely representing chronic thrombosis.   ABDOMEN-PELVIS: 1. Severely attenuated inferior vena cava with multiple calcifications, consistent with chronic IVC thrombosis. 2. Interval development of a T10 vertebral body fracture. Re-demonstration of severe osseous changes throughout the thoracolumbar spine. Findings are likely secondary to renal osteodystrophy. 3. Diffuse thickening of the colon and rectum with the pericolonic stranding consistent with the proctocolitis which can be inflammatory/infectious in etiology. Correlate with clinical symptoms. 4. Diffuse mesenteric haziness and anasarca likely representing fluid overload. 5. Bilateral atrophied kidneys with hypoenhancement, representative of medical renal disease.   I personally reviewed the images/study and I agree with the findings as stated by Benjamin Campbell DO PGY-2. This study was interpreted at [facility], [place], [place].   MACRO: None   Signed by: Ivan Alfredo 7/25/2024 9:58 PM Dictation workstation:   [id number]    Transesophageal Echo (POLI)    Result Date: 7/24/2024   [facility], [address] [place] [address]                Tel [phone] and Fax [phone] TRANSESOPHAGEAL ECHOCARDIOGRAM REPORT  Patient Name:      XIOMARA CASSANDRA Maya Physician:    [id number] Penny Shaw MD Study Date:        7/24/2024            Ordering Provider:    [id number] AZEEM SNYDER MRN/PID:           [id number]             Fellow:               [id number] Trey Hurtado MD Accession#:        [id number]         Nurse:                Radha Alaniz RN  Date of Birth/Age: 1985 / 39 years Sonographer: Gender:            M                    Additional Staff: Height:            175.00 cm            Admit Date: Weight:            74.00 kg             Admission Status:     Inpatient -                                                               Routine BSA / BMI:         1.89 m2 / 24.16      Encounter#:           [id number]                    kg/m2 Blood Pressure:    123/88 mmHg          Department Location: Study Type:    TRANSESOPHAGEAL ECHO (POLI) Diagnosis/ICD: Acute and subacute endocarditis, unspecified-I33.9 Indication:    endocarditis CPT Code:      POLI Complete-50626; Doppler Limited-27249; Color Doppler-86690 Patient History: Allergies:         Daptomycin. Pertinent History: Renal Failure, HTN, Hyperlipidemia, Previous DVT, CHF and                    A-Fib. L AKA, R above elbow amputation. Study Detail: Agitated saline used as a contrast agent for intraseptal flow               evaluation.  PHYSICIAN INTERPRETATION: POLI Details: Technically adequate omniplane transesophageal echocardiogram performed. Agitated saline contrast echo was performed to assess for the presence of a patent foramen ovale. POLI Medication: The pharynx was anesthetized with lidocaine ointment and Topix spray. The patient was sedated using moderate sedation. Midazolam and Fentanyl was used to sedate the patient for this exam. POLI Procedure: The probe was passed without difficulty. Complications encountered during procedure: Patient tolerated the procedure well without any apparent complications. Left Ventricle: Left ventricular ejection fraction is moderately decreased, by visual estimate at 40%. There is global hypokinesis of the left ventricle with minor regional variations. The left ventricular cavity size is normal. Left ventricular diastolic filling was not assessed. Left Atrium: The left atrium is normal in size. There is no definite left atrial thrombus present. There is a  moderately sized patent foramen ovale. The patent foramen ovale was visualized using agitated saline contrast. A bubble study using agitated saline was performed. Bubble study is positive. A moderate PFO (11-20 bubbles) was demonstrated. There is no thrombus visualized in the left atrial appendage. Agitated saline contrast study was positive for an intracardiac shunt (moderate sized PFO). Right Ventricle: The right ventricle is mildly enlarged. There is moderately reduced right ventricular systolic function. Right Atrium: The right atrium is mildly dilated. There is a device visualized in the right atrium. There is a large vegetation that circumferentially encases the femoral TDC ( catheter). There is also involvement of the TV with likely destruction of the septal leaflet of the TV and resultant severe TR. ( the vegatation surrounding the catheter measures at least 1.9cm x 2.5cm. Aortic Valve: The aortic valve is trileaflet. There is no evidence of aortic valve regurgitation. Trielaflet AV with no paravalular abscess or AI, however there is a small mobile echodensity on the LVOT side of the AV with independent motion whcih could be consitent with a small vegetation vs degenerative changes. Mitral Valve: The mitral valve is normal in structure. There is no evidence of mitral valve regurgitation. Tricuspid Valve: The tricuspid valve is abnormal. There is severe tricuspid regurgitation. The tricuspid valve regurgitant jet is eccentrically directed. Please see RA comments. Pulmonic Valve: The pulmonic valve is structurally normal. There is trace pulmonic valve regurgitation. Pericardium: There is no pericardial effusion noted. Aorta: The aortic root is normal. In comparison to the previous echocardiogram(s): Compared with study dated 7/23/2024, Catheter associated vegetation and anatomy of TV are better visualized on today's study. Otherwise, there are no gross changes from the previous surface echo.  CONCLUSIONS:  1.  Left ventricular ejection fraction is moderately decreased, by visual estimate at 40%.  2. There is global hypokinesis of the left ventricle with minor regional variations.  3. Mildly enlarged right ventricle.  4. There is moderately reduced right ventricular systolic function.  5. Agitated saline contrast study was positive for an intracardiac shunt (moderate sized PFO).  6. There is a large vegetation that circumferentially encases the femoral TDC ( catheter). There is also involvement of the TV with likely destruction of the septal leaflet of the TV and resultant severe TR. ( the vegatation surrounding the catheter measures at least 1.9cm x 2.5cm.  7. The tricuspid valve is abnormal.  8. Severe tricuspid regurgitation visualized.  9. Trielaflet AV with no paravalular abscess or AI, however there is a small mobile echodensity on the LVOT side of the AV with independent motion whcih could be consitent with a small vegetation vs degenerative changes. 10. Moderately sized patent foramen ovale. 11. No left atrial thrombus. 12. MICU team nofitied of findings at the time of study. 13. A bubble study using agitated saline was performed. Bubble study is positive. A moderate PFO (11-20 bubbles) was demonstrated. 14. Compared with study dated 7/23/2024, Catheter associated vegetation and anatomy of TV are better visualized on today's study. Otherwise, there are no gross changes from the previous surface echo. QUANTITATIVE DATA SUMMARY: LV SYSTOLIC FUNCTION BY 2D PLANIMETRY (MOD):                      Normal Ranges: EF-Visual:      40 % LV EF Reported: 40 %  [id number] Penny Shaw MD Electronically signed on 7/24/2024 at 6:31:34 PM  ** Final **     Vascular US Lower Extremity Venous Duplex Right    Result Date: 7/24/2024            [place] [address]   Tel [phone] and Fax [phone]  Vascular Lab Report VASC US LOWER EXTREMITY VENOUS DUPLEX RIGHT  Patient Name:      XIOMARA TRUJILLO  PHYLLIS         Reading Physician:  [id number] Bridger Ervin MD Study Date:        7/24/2024            Ordering Physician: [id number] AZEEM SORENSONPAOLA MRN/PID:           [id number]             Technologist:       Antonietta Sierra RVT Accession#:        [id number]         Technologist 2: Date of Birth/Age: 1985 / 39 years Encounter#:         [id number] Gender:            M Admission Status:  Inpatient            Location Performed: [facility]  Diagnosis/ICD: Other specified soft tissue disorders-M79.89 Indication:    Limb swelling CPT Codes:     80118 Peripheral venous duplex scan for DVT Limited  CONCLUSIONS: Right Lower Venous: Unable to obtain right groin compression or visualize the DEIV, CFV, profunda and proximal FVs due to line placement. Can not rule out thrombus in non-visualized areas. Remainder visualized vessels show no evidence of DVT. Left Lower Venous: There are chronic changes visualized in the common femoral and proximal femoral veins.  Imaging & Doppler Findings:  Right       Compressible Thrombus        Flow FV Proximal     Yes        None   Spontaneous/Phasic FV Mid          Yes        None FV Distal       Yes        None Popliteal       Yes        None   Spontaneous/Phasic Peroneal        Yes        None PTV             Yes        None  Left        Compress Thrombus CFV         Partial  Chronic FV Proximal Partial  Chronic  [id number] Bridger Ervin MD Electronically signed by [id number] Bridger Ervin MD on 7/24/2024 at 12:41:59 PM  ** Final **     ECG 12 lead    Result Date: 7/24/2024  Normal sinus rhythm Prolonged QT Abnormal ECG When compared with ECG of 22-JUL-2024 10:44, No significant change was found See ED provider note for full interpretation and clinical correlation Confirmed by Kelsie Cherry ([id number]) on 7/24/2024 5:46:20 AM    Transthoracic Echo (TTE) Limited    Result Date:  7/23/2024   [facility], [address] [place] [address]                Tel [phone] and Fax [phone] TRANSTHORACIC ECHOCARDIOGRAM REPORT  Patient Name:      XIOMARA CASSANDRA Maya Physician:    [id number] Penny Shaw MD Study Date:        7/23/2024            Ordering Provider:    [id number] DENISHA SONG MRN/PID:           [id number]             Fellow: Accession#:        [id number]         Nurse: Date of Birth/Age: 1985 / 39 years Sonographer:          Reji Bhakta                                                               RD Gender:            M                    Additional Staff: Height:            175.26 cm            Admit Date:           7/22/2024 Weight:            73.48 kg             Admission Status:     Inpatient -                                                               Routine BSA / BMI:         1.89 m2 / 23.92      Encounter#:           [id number]                    kg/m2 Blood Pressure:    94/44 mmHg           Department Location:  [address] Study Type:    TRANSTHORACIC ECHO (TTE) LIMITED Diagnosis/ICD: Sepsis, unspecified organism-A41.9 Indication:    concern for endocarditis CPT Code:      Echo Limited-92453; Doppler Limited-12949; Color Doppler-87972 Patient History: Pertinent History: HTN, HLD, septic shock, SIRS, tachycardia, PVVD, ESRD, NICM,                    HFrEF. Study Detail: The following Echo studies were performed: M-Mode, 2D, Doppler and               color flow. Technically challenging study due to prominent lung               artifact, body habitus, patient lying in supine position and poor               acoustic windows. Definity used as a contrast agent for               endocardial border definition. Total contrast used for this               procedure was 4.0 mL via IV push.  Critical Event Critical  Event: Test was completed as per department protocol. Critical Finding: Possible Mass and or Endocarditis in right atrium/tricuspid valve. Time Test was Completed: 9:49:00 AM Notified: Dr. Shaw. Attending notification time: 9:55:00 AM  PHYSICIAN INTERPRETATION: Left Ventricle: Left ventricular ejection fraction is moderately decreased, calculated by Briseno's biplane at 35%. There is global hypokinesis of the left ventricle with minor regional variations. The left ventricular cavity size is normal. Left ventricular diastolic filling was not assessed. There is no definite left ventricular thrombus visualized. Left Atrium: The left atrium was not assessed. Right Ventricle: The right ventricle is normal in size. There is reduced right ventricular systolic function. Right Atrium: The right atrium was not well visualized. There is a device visualized in the right atrium. Several small mobile echodensities noted within the RA likely attached to dialysis catheter and thickened TV with mobile echo densities associated with the TV concerning for possible vegetations with severe TR. Not well seen. Recommend POLI to further assess if clinically indicated. Aortic Valve: The aortic valve is trileaflet. There is mild to moderate aortic valve cusp calcification. There is no evidence of aortic valve regurgitation. Mitral Valve: The mitral valve is mildly thickened. There is mild thickening and calcification of the anterior mitral valve leaflet. There is moderate mitral annular calcification. There is trace mitral valve regurgitation. Tricuspid Valve: The tricuspid valve is abnormal. There is severe tricuspid regurgitation. The Doppler estimated RVSP is mildly elevated at 37.1 mmHg. There is reversed systolic hepatic vein flow. TV with likely mobile echodensities associated tih the leaflets though not well seen with severe TR. RVSP may be underestimated due to severity of TR. Pulmonic Valve: The pulmonic valve is structurally  normal. There is physiologic pulmonic valve regurgitation. Pericardium: There is a trivial pericardial effusion. Aorta: The aortic root is normal. Systemic Veins: The inferior vena cava was not well visualized. In comparison to the previous echocardiogram(s): Compared with the prior exam POLI from 5/14/2024 ( [facility]) the TV was previously normal with only trivial TR. The dialysis catheter seen within the RA is new since the prior POLI and the mobile echodensities and severe TR are new as well. The LV systolic function was already mild to moderately reduced at that time.  CONCLUSIONS:  1. Left ventricular ejection fraction is moderately decreased, calculated by Briseno's biplane at 35%.  2. There is global hypokinesis of the left ventricle with minor regional variations.  3. No left ventricular thrombus visualized.  4. There is reduced right ventricular systolic function.  5. Several small mobile echodensities noted within the RA likely attached to dialysis catheter and thickened TV with mobile echo densities associated with the TV concerning for possible vegetations with severe TR. Not well seen. Recommend POLI to further assess if clinically indicated.  6. There is moderate mitral annular calcification.  7. The tricuspid valve is abnormal.  8. Mildly elevated RVSP.  9. Severe tricuspid regurgitation visualized. 10. TV with likely mobile echodensities associated tih the leaflets though not well seen with severe TR. RVSP may be underestimated due to severity of TR. 11. There is reversed systolic hepatic vein flow. 12. Primary service notified of findings at the time of reporting. 13. Compared with the prior exam POLI from 5/14/2024 ( [facility]) the TV was previously normal with only trivial TR. The dialysis catheter seen within the RA is new since the prior POLI and the mobile echodensities and severe TR are new as well. The LV systolic function was already mild to moderately reduced at that time. QUANTITATIVE DATA SUMMARY: 2D  MEASUREMENTS:                          Normal Ranges: IVSd:          0.80 cm   (0.6-1.1cm) LVPWd:         0.75 cm   (0.6-1.1cm) LVIDd:         3.95 cm   (3.9-5.9cm) LVIDs:         3.25 cm LV Mass Index: 51.5 g/m2 LV % FS        17.7 % AORTA MEASUREMENTS:                      Normal Ranges: Ao Sinus, d: 3.20 cm (2.1-3.5cm) LV SYSTOLIC FUNCTION BY 2D PLANIMETRY (MOD):                      Normal Ranges: EF-A4C View:    40 % (>=55%) EF-A2C View:    29 % EF-Biplane:     35 % LV EF Reported: 35 %  RIGHT VENTRICLE: TAPSE: 18.9 mm RV s'  0.12 m/s TRICUSPID VALVE/RVSP:                             Normal Ranges: Peak TR Velocity: 2.92 m/s RV Syst Pressure: 37.1 mmHg (< 30mmHg)  [id number] Penny Shaw MD Electronically signed on 7/23/2024 at 10:49:40 AM  ** Final **     Point of Care Ultrasound    Result Date: 7/22/2024  Bridger Olguin DO     7/22/2024  7:19 PM Performed by: Bridger Olguin DO Authorized by: Bridger Olguin DO  Procedure: Cardiac Ultrasound Findings:  Views: parasternal long, parasternal short, apical four and subxiphoid The pericardial space was visualized and was NEGATIVE for a significant pericardial effusion. Activity: Ventricular contractions were visualized. LV: LV systolic function was DECREASED. Impression: Cardiac: The focused cardiac ultrasound exam had ABNORMAL findings as specified. Comments: Patient IVC extremely collapsible.      XR foot right 3+ views    Result Date: 7/22/2024  Interpreted By:  Yeni Mantilla, STUDY: Right ankle, 3 views. Right foot, 3 views.   INDICATION: Signs/Symptoms:osteo concern chronic wound; Signs/Symptoms:c/f osteomyelitis.   COMPARISON: None.   ACCESSION NUMBER(S): [id number]; [id number]   ORDERING CLINICIAN: ANDRÉS PEARSON   STUDY: Right ankle/foot: There is severe demineralization of the bones which limits evaluation for subtle fractures. Questionable age-indeterminate fractures of the 2nd, 3rd, and 4th metatarsal necks with slight impacted appearance and  irregularity. Severe vascular calcifications of the ankle and foot. There is ulceration in the posterior aspect of the calcaneus. No osseous erosion, cortical indistinctness, regional osteopenia, or periosteal reaction to suggest radiographic findings of osteomyelitis. Ankle mortise is normally aligned.       Ulceration in the posterior aspect of the calcaneus without radiographic findings of osteomyelitis.   Severe demineralization of the bones which limits evaluation for subtle fractures.   Questionable age-indeterminate 2nd, 3rd, and 4th metatarsal neck fractures which may be however artifactual. Correlate with history for trauma and point tenderness.   MACRO: None.   Signed by: Yeni Mantilla 7/22/2024 2:54 PM Dictation workstation:   [id number]    XR ankle right 3+ views    Result Date: 7/22/2024  Interpreted By:  Yeni Mantilla, STUDY: Right ankle, 3 views. Right foot, 3 views.   INDICATION: Signs/Symptoms:osteo concern chronic wound; Signs/Symptoms:c/f osteomyelitis.   COMPARISON: None.   ACCESSION NUMBER(S): [id number]; [id number]   ORDERING CLINICIAN: ANDRÉS PEARSON   STUDY: Right ankle/foot: There is severe demineralization of the bones which limits evaluation for subtle fractures. Questionable age-indeterminate fractures of the 2nd, 3rd, and 4th metatarsal necks with slight impacted appearance and irregularity. Severe vascular calcifications of the ankle and foot. There is ulceration in the posterior aspect of the calcaneus. No osseous erosion, cortical indistinctness, regional osteopenia, or periosteal reaction to suggest radiographic findings of osteomyelitis. Ankle mortise is normally aligned.       Ulceration in the posterior aspect of the calcaneus without radiographic findings of osteomyelitis.   Severe demineralization of the bones which limits evaluation for subtle fractures.   Questionable age-indeterminate 2nd, 3rd, and 4th metatarsal neck fractures which may be however artifactual.  Correlate with history for trauma and point tenderness.   MACRO: None.   Signed by: Yeni Mantilla 7/22/2024 2:54 PM Dictation workstation:   [id number]    XR chest 1 view    Result Date: 7/22/2024  STUDY: Chest Radiograph;  7/22/2024 10:48AM INDICATION: Hypotension. COMPARISON: 5/28/2024 XR Chest. ACCESSION NUMBER(S): [id number] ORDERING CLINICIAN: LOUIE NORIEGA TECHNIQUE:  Frontal chest was obtained at 11:42 hours. FINDINGS: CARDIOMEDIASTINAL SILHOUETTE: Cardiomediastinal silhouette is normal in size and configuration.  LUNGS: Linear atelectasis in the left base.  Small hazy opacities in the lung bases similar compared to prior.  Trace pleural effusions.  ABDOMEN: No remarkable upper abdominal findings.  BONES: No acute osseous changes.    Linear atelectasis in the left base. Small hazy opacities in the lung bases similar compared to prior. Trace pleural effusions. Signed by Roe Ruiz MD      Assessment and plan:  Edwar Hemphill is a 39 y.o. male PMHx significant for ESRD (MWF via right femoral line), NICM, HFrEF (LVEF 35-40% on 5/2024 POLI), anemia, DM2, Left AKA, RUE amputation, HTN, PVD, GERD transferred from step down after being MICU for sepsis c/b lar. Patient now stable, completed course of ceftaroline (7/23-8/6); per ID will continue with Vanco for 6-8 weeks (end date 9/13/2024). Sepsis c/b femoral catheter tip vegetation in RA involving TV, inducing severe tricuspid regurg. Additionally, c/b osteomyelitis of left wrist requiring I/D on 7/29/24 by Ortho. R consulted on 8/13 to evaluate if right femoral HD catheter can be exchanged for one with a pigtail or if a tunnelled PICC line could be placed next to it. Current Right common femoral vein tunneled dialysis catheter placed in IR on 5/24/2024. Endovascular consulted and they tentatively planning for vegectomy with Dr. Dennis Monday 7/29. Vegectomy aborted due to to concerns for infected thrombus in the IVC and potential incomplete treatment without  removal of the TDC. IR not able to exchange current dialysis line as risk too high that they may not be able to reestablish access. Given poor overall prognosis, GOC discussion was done and patient is now DNR/DNI, patient met and denied hospice on 8/17. Patient undergoes iHD MWF for ESRD, tolerated well day of transfer. Plan moving forward is to finish IV vanc outpatient once plan for LTC administration is in place. Will trial indefinite suppression with Bactrim 1SS daily after vancomycin course per ID. Will need f/up with ID (Dr Doroteo Graham on 9/10).     8/22 Updates:  - Continue warfarin 2mg per vascular medicine and maintain therapeutic range INR 2-3  - Will continue current abx of Vancomycin   - Per Ortho, patient's left wrist wound was dehisced. Pt refused OR so bedside hematoma evac and sharp debridement of dead skin edges was done. Prevena wound vac was placed. Ok to discharge from their perspective with follow up outpatient.   - Discharge to today to [facility]     Micro:   7/31 bcx negative  7/29 abscess? negative  7/29 bcx 4/4 negative  7/27 L wrist synovial fluid positive MRSA  7/27 bcx 1/2 positive MRSA  7/25 bcx 2/4 positive mrsa  7/22 bcx 4/4 positive MRSA    Antibiotics:   Vanc 7/23- present *plan until 9/13  Zosyn on 7/23  Ceftaroline 7/23-8/6  Vanc locks    ###Sepsis 2/2 MRSA  ###Femoral catheter/RA vegetation  ###Left wrist osteomyelitis s/p I/D  :: I/D of left wrist on 7/29/24 by Dr. Chavezr  :: attempted aspiration of left hand by Ortho on 8/14, unsuccessful, no purulence, likely hematoma  :: Will need indefinite bactrim suppression after vanc per ID  :: pressors weaned, continuing on midodrine  :: 7/29 Bcx negative  :: Per Ortho, patient's left wrist wound was dehisced. Pt refused OR so bedside hematoma evac and sharp debridement of dead skin edges was done. Prevena wound vac was placed. Ok to discharge from their perspective with follow up outpatient.   Plan:  - c/w vanc 5mg  -  organize for dispo: vanc needs to be instilled in femoral catheter after dialysis, make sure CDC is able and aware  - c/w midodrine 20mg TID  - c/w PRN tylenol, benadryl, zofran  - c/w dilaudid/oxy for breakthrough pain     ###PVD  :: Left AKA  :: RUE amputation  :: Vascular medicine following; continued on heparin drip and transitioned to Coumadin with INR therapeutic x 2 on 8/11 (2.3) and 8/12 (3.0); Heparin discontinued on 8/12.   :: Coumadin dose changed to 2.0mg per vascular medicine  Plan:  - Continue warfarin 2.0 mg and monitor INR for therapeutic range  - c/w atorvastatin 50mg nightly    ###ESRD  Plan:  - iHD MWF     ##HFrEF (LVEF 35-40% on 5/2024 POLI)  ##NICM  ##Anemia  :: baseline Hgb 7-8  :: Hgb 8.0 today  Plan:  - c/w EPO 10,000 units MWF  - Continue to monitor      ##DM2  :: Patient has multiple episodes of hyperglycemia. Counseled patient to stop eating candy throughout the day.  :: He states his appetite is baseline and is eating/drinking well   Plan:  - Adjusted SSI to mild dosing  - Will continue to monitor.     #Constipation  - Discontinue rectal bisacodyl 10mg daily  - miralax BID  - sennosides-docusate sodium, 1 tablet, oral, BID     #Hypothyroidism  - Continue levo 150mg daily     #Preventative  - B complex-vitamin C-folic acid, 1 capsule, oral, Daily  - Discontinue cholecalciferol, 50,000 Units, oral, Once per day on Monday Thursday  - folic acid, 1 mg, oral, Daily  - thiamine, 200 mg, oral, Daily     #Misc  - melatonin 6mg nightly  - Discontinue QUEtiapine, 12.5 mg, oral, Nightly     F: PRN  E: PRN  N: Adult diet 2-3 grams sodium  A: pIV  DVT ppx: Warfarin 2mg  GI ppx: protonix  CODE STATUS: DNR and No Intubation   NOK: Shanthi Hemphill (Mother) [phone]    Patient and plan discussed with attending physician Dr. Hernandez.    Nikia Simmons MD  PGY-1 Internal Medicine

## 2024-08-22 NOTE — NURSING NOTE
Discharge Note  Patient remained safe and stable on this shift. Patient is currently discharged to [facility]. IV removed and pressure dressing applied. Report given to Nimisha (receiving nurse) at [facility]. Patient was picked on stretcher by [facility]. Patient left with all his belongings

## 2024-08-22 NOTE — PROGRESS NOTES
"Edwar Hemphill is a 39 y.o. male on day 31 of admission presenting with Septic shock (Multi).    Subjective   Pt resting in bed. Denies sob, n/v/d, fever, cough, chills, chest pains, pain, light head, dizziness, constipation       Objective     Physical Exam  Vitals and nursing note reviewed.   Constitutional:       Appearance: He is obese.   Cardiovascular:      Rate and Rhythm: Normal rate.   Pulmonary:      Comments: Mckinley lung sounds dim  Abdominal:      General: There is distension.      Comments: Non-tender to palpation   Genitourinary:     Comments: States anuric  Musculoskeletal:      Comments: Rt upper arm amp  Lt aka +3 pitting edema  Rt leg +3 pitting edema   Skin:     General: Skin is warm and dry.      Comments: Lt wrist with kerlix wrap   Neurological:      Mental Status: He is alert and oriented to person, place, and time.   Psychiatric:         Mood and Affect: Mood normal.         Behavior: Behavior normal.         Last Recorded Vitals  Blood pressure 117/84, pulse 98, temperature 36.4 °C (97.5 °F), resp. rate 16, height 1.753 m (5' 9.02\"), weight 86.6 kg (190 lb 14.7 oz), SpO2 100%.  Intake/Output last 3 Shifts:  I/O last 3 completed shifts:  In: 1600 (22.6 mL/kg) [I.V.:800 (11.3 mL/kg); Other:800]  Out: 4800 (67.7 mL/kg) [Other:4800]  Dosing Weight: 70.9 kg     Relevant Results  Results for orders placed or performed during the hospital encounter of 07/22/24 (from the past 24 hour(s))   POCT GLUCOSE   Result Value Ref Range    POCT Glucose 375 (H) 74 - 99 mg/dL   ECG 12 Lead   Result Value Ref Range    Ventricular Rate 91 BPM    Atrial Rate 91 BPM    FL Interval 122 ms    QRS Duration 68 ms    QT Interval 358 ms    QTC Calculation(Bazett) 440 ms    P Axis 21 degrees    R Axis -7 degrees    T Axis 66 degrees    QRS Count 14 beats    Q Onset 222 ms    P Onset 161 ms    P Offset 193 ms    T Offset 401 ms    QTC Fredericia 411 ms   Protime-INR   Result Value Ref Range    Protime 23.3 (H) 9.8 - 12.8 " seconds    INR 2.1 (H) 0.9 - 1.1   Vancomycin   Result Value Ref Range    Vancomycin 18.8 5.0 - 20.0 ug/mL   CBC   Result Value Ref Range    WBC 7.0 4.4 - 11.3 x10*3/uL    nRBC 0.0 0.0 - 0.0 /100 WBCs    RBC 3.17 (L) 4.50 - 5.90 x10*6/uL    Hemoglobin 9.2 (L) 13.5 - 17.5 g/dL    Hematocrit 29.8 (L) 41.0 - 52.0 %    MCV 94 80 - 100 fL    MCH 29.0 26.0 - 34.0 pg    MCHC 30.9 (L) 32.0 - 36.0 g/dL    RDW 19.4 (H) 11.5 - 14.5 %    Platelets 128 (L) 150 - 450 x10*3/uL   Magnesium   Result Value Ref Range    Magnesium 1.95 1.60 - 2.40 mg/dL   Renal function panel   Result Value Ref Range    Glucose 316 (H) 74 - 99 mg/dL    Sodium 135 (L) 136 - 145 mmol/L    Potassium 5.6 (H) 3.5 - 5.3 mmol/L    Chloride 99 98 - 107 mmol/L    Bicarbonate 27 21 - 32 mmol/L    Anion Gap 15 10 - 20 mmol/L    Urea Nitrogen 34 (H) 6 - 23 mg/dL    Creatinine 6.58 (H) 0.50 - 1.30 mg/dL    eGFR 10 (L) >60 mL/min/1.73m*2    Calcium 8.8 8.6 - 10.6 mg/dL    Phosphorus 4.9 2.5 - 4.9 mg/dL    Albumin 2.7 (L) 3.4 - 5.0 g/dL   Type and Screen   Result Value Ref Range    ABO TYPE O     Rh TYPE POS     ANTIBODY SCREEN NEG    POCT GLUCOSE   Result Value Ref Range    POCT Glucose 226 (H) 74 - 99 mg/dL   POCT GLUCOSE   Result Value Ref Range    POCT Glucose 194 (H) 74 - 99 mg/dL   POCT GLUCOSE   Result Value Ref Range    POCT Glucose 223 (H) 74 - 99 mg/dL   Type And Screen   Result Value Ref Range    ABO TYPE O     Rh TYPE POS     ANTIBODY SCREEN NEG    Protime-INR   Result Value Ref Range    Protime 21.7 (H) 9.8 - 12.8 seconds    INR 1.9 (H) 0.9 - 1.1   CBC   Result Value Ref Range    WBC 6.7 4.4 - 11.3 x10*3/uL    nRBC 0.0 0.0 - 0.0 /100 WBCs    RBC 3.19 (L) 4.50 - 5.90 x10*6/uL    Hemoglobin 9.2 (L) 13.5 - 17.5 g/dL    Hematocrit 29.6 (L) 41.0 - 52.0 %    MCV 93 80 - 100 fL    MCH 28.8 26.0 - 34.0 pg    MCHC 31.1 (L) 32.0 - 36.0 g/dL    RDW 19.9 (H) 11.5 - 14.5 %    Platelets 118 (L) 150 - 450 x10*3/uL   Magnesium   Result Value Ref Range    Magnesium 2.01  1.60 - 2.40 mg/dL   Renal function panel   Result Value Ref Range    Glucose 203 (H) 74 - 99 mg/dL    Sodium 136 136 - 145 mmol/L    Potassium 4.8 3.5 - 5.3 mmol/L    Chloride 100 98 - 107 mmol/L    Bicarbonate 27 21 - 32 mmol/L    Anion Gap 14 10 - 20 mmol/L    Urea Nitrogen 21 6 - 23 mg/dL    Creatinine 4.58 (H) 0.50 - 1.30 mg/dL    eGFR 16 (L) >60 mL/min/1.73m*2    Calcium 9.3 8.6 - 10.6 mg/dL    Phosphorus 3.6 2.5 - 4.9 mg/dL    Albumin 2.9 (L) 3.4 - 5.0 g/dL          Scheduled medications  B complex-vitamin C-folic acid, 1 capsule, oral, Daily  epoetin tyson or biosimilar, 12,000 Units, intravenous, Once per day on Monday Wednesday Friday  folic acid, 1 mg, oral, Daily  insulin lispro, 0-5 Units, subcutaneous, TID  levothyroxine, 150 mcg, oral, Daily before breakfast  melatonin, 6 mg, oral, Nightly  midodrine, 20 mg, oral, TID  nystatin, 1 Application, Topical, BID  pantoprazole, 40 mg, oral, BID AC  [START ON 9/6/2024] pantoprazole, 40 mg, oral, Daily before breakfast  polyethylene glycol, 17 g, oral, BID  sennosides-docusate sodium, 1 tablet, oral, BID  thiamine, 200 mg, oral, Daily  vancomycin 5 mg/mL + heparin 2500 units/mL in NS lock, 2.4 mL, intra-catheter, After Dialysis  vancomycin 5 mg/mL + heparin 2500 units/mL in NS lock, 2.4 mL, intra-catheter, q48h  warfarin, 2.5 mg, oral, Daily      Continuous medications     PRN medications  PRN medications: acetaminophen **OR** [DISCONTINUED] acetaminophen **OR** [DISCONTINUED] acetaminophen, alteplase, bisacodyl, dextrose, dextrose, diphenhydrAMINE, glucagon, glucagon, ondansetron, oxyCODONE, vancomycin                    Assessment/Plan   Assessment & Plan  Septic shock (Multi)    Acute infective endocarditis (HHS-HCC)    Tricuspid valve vegetation (HHS-HCC)    Staphylococcal arthritis of left wrist (Multi)    Infection of left wrist (Multi)    Wound dehiscence    Tolerated hemodialysis yesterday  with net fluid loss 2L    Bp stable with tx (98//74)  hypervolemic on exam and has stable electrolytes . K+=4.8     Outpatient Dialysis schedule:  [facility]/Dr Licona      Access: rt fem graft- no issues - able to achieve      Anemia of ESRD:  epoetin tyson (Epogen,Procrit) injection 12,000 Units on dialysis days.. current hgb 9.2.. will cont to monitor      CKD-MBD Phosphate Binder:B complex-vitamin C-folic acid (Nephrocaps) capsule 1 capsule daily, cholecalciferol (Vitamin D-3) capsule 50,000 Units M/R     Plan HD tomorrow with UF as tolerated     Renal diet      Please obtain daily standing wt (if possible)     Medication to be adjusted for ESRD      Patient to continue regular HD schedule while inpatient and to follow with the outpatient nephrologist at discharge         Awaiting placement    AMARILYS Alexandre-CNP

## 2024-08-22 NOTE — PROGRESS NOTES
Vancomycin Dosing by Pharmacy- FOLLOW UP    Edwar Hemphill is a 39 y.o. year old male who Pharmacy has been consulted for vancomycin dosing for other bacteremia . Based on the patient's indication and renal status this patient is being dosed based on a goal pre-HD level of 20-25.     Renal function is currently declining.      Most recent random level: 18.8 mcg/mL    Visit Vitals  /84   Pulse 98   Temp 36.4 °C (97.5 °F)   Resp 16        Lab Results   Component Value Date    CREATININE 6.58 (H) 2024    CREATININE 4.61 (H) 2024    CREATININE 5.62 (H) 2024    CREATININE 5.03 (H) 2024        Patient weight is as follows:   Vitals:    24 0619   Weight: 86.6 kg (190 lb 14.7 oz)       Cultures:  No results found for the encounter in last 14 days.       I/O last 3 completed shifts:  In: 1600 (22.6 mL/kg) [I.V.:800 (11.3 mL/kg); Other:800]  Out: 4800 (67.7 mL/kg) [Other:4800]  Dosing Weight: 70.9 kg   I/O during current shift:  No intake/output data recorded.    Temp (24hrs), Av.6 °C (97.9 °F), Min:36.2 °C (97.2 °F), Max:37 °C (98.6 °F)      Assessment/Plan    Within goal random/trough level        Patient Receiving a post HD dose of 750 mg once today    The next level will be obtained on  at 0500. May be obtained sooner if clinically indicated.   Will continue to monitor renal function daily while on vancomycin and order serum creatinine at least every 48 hours if not already ordered.  Follow for continued vancomycin needs, clinical response, and signs/symptoms of toxicity.       Lizy Jo, PharmD

## 2024-08-22 NOTE — NURSING NOTE
Report to Receiving RN:    Report To: Shi THORNTON  Time Report Called: 2055  Hand-Off Communication: tolerated treatment, 2 liters of fluid removed, VSS  Complications During Treatment: No  Ultrafiltration Treatment: No  Medications Administered During Dialysis: Yes, tylenol 650 mg po, oxycodone 5 mg po, midodrine 20 mg po  Blood Products Administered During Dialysis: No  Labs Sent During Dialysis: Yes  Heparin Drip Rate Changes: N/A  Dialysis Catheter Dressing: D&I  Last Dressing Change: 8-21-24

## 2024-08-22 NOTE — PROGRESS NOTES
Discharge Transfer to Facility  Patient will discharge today to: [facility]  Facility name: [facility]  Facility phone number: [phone]  Unit [place] aware.  Bedside nurse (name) aware to call report: Malou  Phone number for report: [phone] unit [address]  Ambulance transport has been arranged.  Ambulance Company name: [facility]  [facility]ulance Company phone number: [phone]   time: 1pm  Patient aware of transport time.  Patient has been approved for discharge after 8pm: n/a  Per medical team transport arrived early while pt was still in dialysis so he did not discharge yesterday as planned. Transport was initially arranged for 11am this morning but pushed back to 1pm (spoke with Nadiya at [facility] [phone]) per medical team's request. Medical team and facility updated on transport time. Care coordinator will continue to follow for discharge planning needs.    Mary Ann Jenkins RN  Transitional Care Coordinator (TCC)   [phone] or [phone]

## 2024-08-22 NOTE — DISCHARGE SUMMARY
Discharge Diagnosis  Septic shock (Multi)    Issues Requiring Follow-Up  [ ] Please follow up with Orthopeadics clinic, Dr. Allen, on 9/9/2024 at 1:15 PM on for a post-operative evaluation at [facility]   [ ] Please follow up with Vascular Medicine, Dr. Nhung Carrillo, outpatient on 10/2/2024 at 2:30 PM to discuss your warfarin dosing   [ ] Please follow up with Infectious Disease, Dr. Barnett, outpatient on 9/10/2024 to discuss your antibiotic regimen     Discharge Meds     Your medication list        START taking these medications        Instructions Last Dose Given Next Dose Due   acetaminophen 325 mg tablet  Commonly known as: Tylenol      Take 2 tablets (650 mg) by mouth every 6 hours if needed for mild pain (1 - 3).       alteplase 2 mg injection  Commonly known as: Cathflo Activase      1 mL (1 mg) by intra-catheter route if needed (As needed for line care).       bisacodyl 10 mg suppository  Commonly known as: Dulcolax      Insert 1 suppository (10 mg) into the rectum once daily as needed for constipation.       diphenhydrAMINE 25 mg capsule  Commonly known as: BENADryl      Take 1 capsule (25 mg) by mouth every 6 hours if needed for itching.       oxyCODONE 5 mg immediate release tablet  Commonly known as: Roxicodone      Take 1 tablet (5 mg) by mouth every 4 hours if needed for moderate pain (4 - 6) or severe pain (7 - 10).       polyethylene glycol 17 gram packet  Commonly known as: Glycolax, Miralax      Take 17 g by mouth 2 times a day as needed (constipation).       sennosides-docusate sodium 8.6-50 mg tablet  Commonly known as: Aracelis-Colace      Take 1 tablet by mouth 2 times a day as needed for constipation.       vancomycin 1 g in dextrose 5% 250 mL IVPB      Infuse 1 g at 166.7 mL/hr over 90 minutes into a venous catheter once a day on Monday, Wednesday, and Friday. To be given after HD, through 9/13 and then transition to bactrim       vancomycin-heparin (porcine) in sodium  chloride 0.9% solution      2 mL by intra-catheter route every other day.       warfarin 2 mg tablet  Commonly known as: Coumadin      Take as directed per After Visit Summary.              CHANGE how you take these medications        Instructions Last Dose Given Next Dose Due   Lantus U-100 Insulin 100 unit/mL injection  Generic drug: insulin glargine  What changed: how much to take      Inject 4 Units under the skin once daily at bedtime. Take as directed per insulin instructions.       levothyroxine 150 mcg tablet  Commonly known as: Synthroid, Levoxyl  What changed:   medication strength  how much to take      Take 1 tablet (150 mcg) by mouth once daily in the morning. Take before meals.       midodrine 10 mg tablet  Commonly known as: Proamatine  What changed:   how much to take  when to take this  reasons to take this  additional instructions      Take 2 tablets (20 mg) by mouth 3 times daily (morning, midday, late afternoon). Hold for systolic BP >140, and please time it on dialysis days to be given 20 minutes prior to dialysis       pantoprazole 40 mg EC tablet  Commonly known as: ProtoNix  What changed: when to take this      Take 1 tablet (40 mg) by mouth 2 times a day before meals for 30 doses. Do not crush, chew, or split.       pantoprazole 40 mg EC tablet  Commonly known as: ProtoNix  Start taking on: September 6, 2024  What changed: You were already taking a medication with the same name, and this prescription was added. Make sure you understand how and when to take each.      Take 1 tablet (40 mg) by mouth once daily in the morning. Take before meals. Start once a day dosing on 9/6  Do not crush, chew, or split. Do not fill before September 6, 2024.              CONTINUE taking these medications        Instructions Last Dose Given Next Dose Due   B complex-vitamin C-folic acid 1 mg capsule  Commonly known as: Nephrocaps           folic acid 1 mg tablet  Commonly known as: Folvite           insulin  lispro 100 unit/mL injection  Commonly known as: HumaLOG      Inject 2-10 Units under the skin 3 times daily (morning, midday, late afternoon). If 151-200= 2 units  201-250= 4 units  251-300= 6 units   301-350= 8 units  351-400= 10 units  >400 notify provider       melatonin 3 mg tablet           thiamine 100 mg tablet  Commonly known as: Vitamin B-1                  STOP taking these medications      atorvastatin 40 mg tablet  Commonly known as: Lipitor        cholecalciferol 50,000 unit capsule  Commonly known as: Vitamin D-3        epoetin tyson-epbx 10,000 unit/mL injection  Commonly known as: Retacrit        Lactobacillus acidophilus 1 billion cell tablet        Lokelma 5 gram packet  Generic drug: sodium zirconium cyclosilicate        loperamide 2 mg capsule  Commonly known as: Imodium        oxyCODONE-acetaminophen 5-325 mg tablet  Commonly known as: Percocet        sevelamer carbonate 800 mg tablet  Commonly known as: Renvela                  Where to Get Your Medications        These medications were sent to [facility] [id number] [facility]  [address] [facility] [address]      Phone: [phone]   diphenhydrAMINE 25 mg capsule       Information about where to get these medications is not yet available    Ask your nurse or doctor about these medications  acetaminophen 325 mg tablet  alteplase 2 mg injection  bisacodyl 10 mg suppository  insulin lispro 100 unit/mL injection  Lantus U-100 Insulin 100 unit/mL injection  levothyroxine 150 mcg tablet  midodrine 10 mg tablet  oxyCODONE 5 mg immediate release tablet  pantoprazole 40 mg EC tablet  pantoprazole 40 mg EC tablet  polyethylene glycol 17 gram packet  sennosides-docusate sodium 8.6-50 mg tablet  vancomycin 1 g in dextrose 5% 250 mL IVPB  vancomycin-heparin (porcine) in sodium chloride 0.9% solution  warfarin 2 mg tablet         Test Results Pending At Discharge  Pending Labs       Order Current Status    Type And Screen In  process            Hospital Course  Edwar Hemphill is a 39 y.o. male with PMHx significant for ESRD (MWF  via right femoral line), NICM,  HFrEF (LVEF 35-40% on 5/2024 POLI), anemia, DM2, Left AKA, RUE amputation, HTN, PVD, GERD.  Patient presented to his dialysis session last night. and was found to be hypotensive 70s/40s, and was told to brought to the ED.  Upon presentation in the ED, /76, RR 16, afebrile, tachycardic (109) on room air.  Patient's labs revealed hyponatremia, hypokalemia at 3.1, creatinine of 6.88 (patient is ESRD) lactate 3.3, WBC 20 K, hemoglobin 7.3 (baseline), platelets 65 (baseline 180s).  Patient was also evaluated by [facility] and found to have collapsible IVC, + BC 2/4 growing GPC.  Patient was started on Vanc/ Zosyn as c/f sepsis (patient has a history of MRSA sepsis) and received 2.5 L of LR in the ED as well as started on low-dose pressors.     MICU course:  Admitted  with septic shock required pressors and infective endocarditis with large TV vegetation on TTE, w/ bcx growing GPCs with staph aureus (with history MRSA bacteremia before) and Acinetobacter baumanni positive on his wound culture. Started on vanc/zosyn, IVFresuscitated, ID, cardiology and vascular medicine consulted. TTE showed TV vegetation and possible source could be femoral catheter with attached vegetations.  Cardiology consulted and advised for POLI. Patient continued on CVVH based on nephrology consult (plan was to continue dialysis before we take the line out). He had a drop in his plts from 65 >37 s/p 2 units plts before POLI procedure and plts uptrended to 57. Zosyn changed to ceftaroline on 7/23 and continued with vancomycin. POLI was done on 7/24 and showed, a large vegetation that circumferentially encases the femoral TDC ( catheter) and presented PFO. 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 IR, cardiology, ID, Vascular medicine were on board in terms of decision for taking this line off. Because of risk of shedding from the attached veg planned to take this line off with surgery.     Patient was complaining of wrist pain and ID recommended with MRI and synovial tap given his history of osteomyelitis with MRSA. MRI was unremarkable, synovial tap was done and showed less than wbc and culture is still pending.   GI consulted given his history of GIB in June and drop in his Hb in terms of starting anticoagulation. Per GI risk of bleeding is less than thrombosis and they referred the decision of anticoagulation therapy to primary team. Given his HB drop and low plts we held on anticoagulation for now.   Cardiac surgery consulted and they recommended CTA and consult with endovascular team for possible vagectomy, given his multiple thrombi in SVC, IVC and difficulties on getting access for bypass.  CTA showed severely attenuated superior vena cava, bilateral brachiocephalic, subclavian and internal jugular veins with the calcifications in the brachiocephalic veins with extensive collaterals in the chest wall likely representing chronic thrombosis. Endovascular consulted and they tentatively planning for vegectomy with Dr. Dennis Monday 7/29. Vegectomy aborted due to to concerns for infected thrombus in the IVC and potential incomplete treatment without removal of the TDC.      Synovial fluid growing MRSA.  Unable to tolerate HD and required SLED and low dose levo.  Patient weaned of pressors; continue with Midodrine.  Cultures from 7/29 (blood) negative.  Vasc med consulted for homegoing recommendations iso recent GIB and PMHx of DVTs.  Pt completed course of ceftaroline (7/23-8/6); per ID will continue with Vanco for 6-8 weeks (end date 9/13/2024).  CT head without evidence of intracranial mass or extra-axial collection. Patient tolerated iHD on 8/7 and  transferred to SDU as need to utilizing HD cath venous (blue) port for Heparin infusion initiated on 7/31 due to limited IV access.       SDU course:   In SDU, Midodrine decreased from 15mg Q8hrs to 10mg BID during day and 15mg HS.  Patient developed euvolemic DKA on 8/11, resolved, now hypoglycemia d/t poor appetite.  Vascular medicine following; continued on heparin drip and transitioned to Coumadin with INR therapeutic x 2 on 8/11 (2.3) and 8/12 (3.0); Heparin discontinued on 8/12.  Continue vancomycin post-HD through end date 9/13; instill vanc into HD cath after each HD session.  Will trial indefinite suppression with Bactrim 1SS daily after vancomycin course per ID.  Will need f/up with ID (Dr Doroteo Graham on 9/10).    IR consulted on 8/13 to evaluate if right femoral HD catheter can be exchanged for one with a pigtail or if a tunnelled PICC line could be placed next to it. Current Right common femoral vein tunneled dialysis catheter placed in IR on 5/24/2024.   IR not able to exchange current dialysis line as risk too high that they may not be able to reestablish access.  8/14 seen by Ortho hand, unsuccessful aspiration attempt of left wrist. This is likely a  hematoma given no purulent drainage.  Plan to keep elevated to help with swelling.    Due to hypotension midodrine increased back to 15mg TID.   Partial session of iHD d/t hypotension.  Ultrafiltration done on 8/15-16.    Given poor overall prognosis GOIC discussion held and pt was made DNAR/DNI.  Hospice was consulted     8/17 patient met with Hospice, not wishing to pursue hospice at this time.     8/19 Patient underwent iHD at bedside and tolerated well from BP standpoint.  Plan to transfer to [facility].  Discharge plan is to return to [facility] once IV atb coordinated with  KAMILA LEÓN and [facility] [place] Nephrologist.  Dr. Mckeon is medical director at [facility] - [place] office [phone] cell [phone]. [facility] MD needs to speak with nephrology or ID  before accepting pt with Vanco.  [facility] will need a physician and fax number for results of titrating.      MICRO:   - Blood cx + MRSA On 7/22 & 7/25  - Blood cx + Staph Haemolyticus; Synovial fluid from Left wrist MRSA, on  7/27  - Blood cx negative on 7/29 and 7/31 (final)  - Fungal cx (tissue) pending 7/29, Bacterial cx neg     Antibiotic:   - Vancomycin post-HD through end date 9/13     General Medicine Floor Course:  Patient arrived to the floor on 8/20/2024 in stable condition. He remained hypotensive with blood pressures ranging from 77/48 - 91/72 on midodrine 20mg. He reported no symptoms of dizziness or lightheadedness. He also had an episode of hypoglycemia overnight after which he was changed to a mild SSI. Orthopedics planned on removing patient's sutures from his left wrist on 8/21 s/p I&D on 7/29. However, the wound was dehisced and did not appear infected. Patient refused to go the OR. A bedside hematoma evacuation was done with sharp debridement of dead skin edges and placement of prevena wound vac. He was ok to be discharged from orthopedics standpoint with 1-2 week follow up outpatient with Dr. Allen.     Patient's clinical status is likely incurable given his many co-morbidities. Palliative care involved during this admission and patient denied pursuing hospice care. Patient to follow with Hospice navigator outpatient.    He will follow with infectious disease, Dr. Graham, outpatient on 9/10/2024.    Patient discharged to [facility] on 8/22/2024 in stable condition.     Appointments/Follow-Up on Discharge:   Please follow up with your primary care physician, Dr. Mauricio Estrada, in 1 week to discuss your recent hospital admission  Please follow up with Orthopeadics clinic, Dr. Allen, on 9/9/2024 at 1:15 PM on for a post-operative evaluation at [facility]   Please follow up with Vascular Medicine, Dr. Nhung Carrillo, outpatient on 10/2/2024 at 2:30 PM to discuss your  warfarin dosing   Please follow up with Infectious Disease, Dr. Barnett, outpatient on 9/10/2024 to discuss your antibiotic regimen   Please follow up with Cardiology, Dr. Carrillo, outpatient on 10/2/2024   Please follow up with Transplant, Dr. Boyd, outpatient on 10/24/2024  Please follow up with Dr. Licona (Nephrology) in outpatient hemodialysis      Pertinent Physical Exam At Time of Discharge  Constitutional: well appearing pt in NAD, alert and cooperative  Eyes: PERRL, EOMI, no icterus   ENMT: mucous membranes moist, no apparent injury, no lesions seen  Head/Neck: Neck supple, no apparent injury  Respiratory/Thorax: Lungs CTA bilaterally, non-labored breathing, no cough, on RA  Cardiovascular: Regular, rate and rhythm, no murmurs, normal S1 and S2  Gastrointestinal: Nondistended, soft, non-tender, BS hypoactive x 4  Musculoskeletal: RUE amputation, L aka, Left wrist surgical site wrapped  Extremities: +3 edema RLE  Neurological: alert and oriented x 3, speech clear, follows commands appropriately  Skin: Warm and dry/flaky, right fem dialysis line    Outpatient Follow-Up  Future Appointments   Date Time Provider Department Center   9/9/2024  1:15 PM Will B Bedannir, DO BEAORT1 [facility]   9/10/2024 10:00 AM Doroteo Graham MD [id number] [facility]   10/2/2024  2:30 PM Nhung Carrillo MD [id number] [facility]   10/24/2024 10:00 AM Chandu Boyd LCSW [id number] [facility]       Nikia Simmons MD  Internal Medicine PGY-1

## 2024-08-30 NOTE — DOCUMENTATION CLARIFICATION NOTE
"    PATIENT:               XIOMARA FERGUSON  ACCT #:                  [id number]  MRN:                       [id number]  :                       1985  ADMIT DATE:       2024 10:26 AM  DISCH DATE:        2024 1:18 PM  RESPONDING PROVIDER #:        [id number]          PROVIDER RESPONSE TEXT:    Excisional debridement to and including skin    CDI QUERY TEXT:    Clarification    Instruction:    Based on your assessment of the patient and the clinical information, please provide the requested documentation by clicking on the appropriate radio button and enter any additional information if prompted.    Question: Please further clarify the debridement procedure as    When answering this query, please exercise your independent professional judgment. The fact that a question is being asked, does not imply that any particular answer is desired or expected.    The patient's clinical indicators include:  Clinical Information: This query refers to the procedure performed on 24 documented as Bedside hematoma evac, sharp debridement of dead skin edges    From dpn on 24- \"Wound has dehisced. Does not appear infectedPatient refused return to OR. Bedside hematoma evac, sharp debridement of dead skin edges, closure with 2-0 nylon, placement of prevena wound vac, volar resting slab splint applied. OK for DC  Options provided:  -- Excisional debridement to and including skin  -- Non-excisional debridement to and including skin  -- Other - I will add my own diagnosis  -- Refer to Clinical Documentation Reviewer    Query created by: Lolly Lan on 2024 9:10 AM      Electronically signed by:  FREDA ABREU MD 2024 2:40 PM          "

## 2024-09-06 NOTE — DOCUMENTATION CLARIFICATION NOTE
"    PATIENT:               XIOMARA FERGUSON  [id number]  MRN:                       [id number]  :                       1985  ADMIT DATE:       2024 10:26 AM  DISCH DATE:        2024 1:18 PM  RESPONDING PROVIDER #:        [id number]          PROVIDER RESPONSE TEXT:    Excisional debridement down to and including muscle    CDI QUERY TEXT:    Clarification    Instruction:    Based on your assessment of the patient and the clinical information, please provide the requested documentation by clicking on the appropriate radio button and enter any additional information if prompted.    Question: Please further clarify the debridement procedure as    When answering this query, please exercise your independent professional judgment. The fact that a question is being asked, does not imply that any particular answer is desired or expected.    The patient's clinical indicators include:  Clinical Information: This query refers to the procedure performed on 24 and documented as MS ARTHRS WRST EXC&/RPR TRIANG FIBROCART&/JOINT    From Op report \"The patient was transported to the operating room and placed in the supine position on the operating table. All extremities and prominences were appropriately padded. Adequate anesthesia was induced. A non-sterile tourniquet was applied high on the left arm. The left arm was prepped and draped in standard sterile fashion. A time-out was conducted prior to beginning the operation to confirm the patient's identity, planned procedures, laterality of procedures, and that appropriate antibiotics had been administered within 30 minutes of incision. The left upper extremity was exsanguinated with gravity elevation and the tourniquet was inflated to 250mmHg.    A dorsal longitudinal incision was made just ulnar to Levar's tubercle in line with the third metacarpal.  Full-thickness skin flaps were created.  Blunt dissection was carried down to the extensor " "retinaculum.  Care was taken not to damage any dorsal sensory branches.  EPL tendon had already been transposed from a previous surgery.  The second dorsal compartments were identified.  The fourth dorsal compartments were identified.  These were retracted.  The second dorsal compartments tendons were retracted radially and the fourth and retracted ulnarly.  A 15 blade was used to make a longitudinal incision in the dorsal wrist capsule.  Upon entering the radiocarpal and midcarpal joint there was significant amount of purulent fluid.  Multiple cultures were obtained.  The radiocarpal and midcarpal joints were debrided with a 15 blade and a rongeur of synovium.  The wound was copiously irrigated with 3 L normal saline.  Irrisept solution was used as well.  The wound was then copiously irrigated once more with normal saline. The capsule was loosely approximated with 0 PDS suture.  The skin was closed with 3-0 nylon suture.  A bulky sterile dressing was placed.\"  Options provided:  -- Excisional debridement to and including skin  -- Excisional debridement down to and including subcutaneous tissue and fascia  -- Excisional debridement down to and including muscle  -- Excisional debridement down to and including bone, Please specify bone involved below  -- Non-excisional debridement to and including skin  -- Non-excisional debridement down to and including subcutaneous tissue and fascia  -- Non-excisional debridement down to and including muscle  -- Non-excisional debridement down to and including bone, Please specify bone involved below  -- Other - I will add my own diagnosis  -- Refer to Clinical Documentation Reviewer    Query created by: Lolly Lan on 8/27/2024 9:02 AM      Electronically signed by:  ANG ANDRES DO 9/6/2024 8:29 AM          "

## 2024-09-09 ENCOUNTER — APPOINTMENT (OUTPATIENT)
Dept: ORTHOPEDIC SURGERY | Facility: HOSPITAL | Age: 39
End: 2024-09-09
Payer: COMMERCIAL

## 2024-09-10 ENCOUNTER — APPOINTMENT (OUTPATIENT)
Dept: INFECTIOUS DISEASES | Facility: CLINIC | Age: 39
End: 2024-09-10
Payer: COMMERCIAL

## 2024-09-10 DIAGNOSIS — A49.02 MRSA INFECTION: Primary | ICD-10-CM

## 2024-09-10 PROCEDURE — 99443 PR PHYS/QHP TELEPHONE EVALUATION 21-30 MIN: CPT | Performed by: STUDENT IN AN ORGANIZED HEALTH CARE EDUCATION/TRAINING PROGRAM

## 2024-09-10 NOTE — PROGRESS NOTES
[facility] Infectious Diseases Consultation Note    Date of Consultation/Follow-up: 9/10/2024  Primary MD: Mauricio Estrada MD    Reason For Consult: MRSA bacteremia    History Of Present Illness  Edwar Hemphill is a 39 y.o. 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     Cultures cleared 7/29. Vegectomy not pursued ultimately due to comorbidities. CT surgery previously evaluated as well, not planned for surgical intervention for vegetation nor clot. iHD line has also been declined to be exchanged due to access limitations/availability of providers. Discharged on IV vancomycin x 6 weeks followed by plan for Bactrim suppression.    Today he reports doing well, including his wrist. His R ankle is a bit sore, started after after his hospitalization, may rolled it on when walking through a door; intermittently swollen off/on but no warmth, redness, or ROM limitations. No hearing issues. No fevers, chills, rash, diarrhea. Dialyzed at [facility], staying currently at [facility].     Past Medical History  He has a past medical history of Chronic kidney disease, Diabetes mellitus (Multi), ESRD (end stage renal disease) (Multi), Essential (primary) hypertension (05/26/2017), GERD (gastroesophageal reflux disease), Hyperlipidemia, and Hypothyroidism.    Surgical History  He has a past surgical history that includes CT angio aorta and bilateral iliofemoral runoff w and or wo IV contrast (02/09/2023); IR CVC exchange (11/13/2023); Toe amputation (Left, 02/17/2023); Leg amputation, lower tibia/fibula (Left,  07/05/2023); Leg amputation through knee (Left, 07/08/2023); Wound debridement (Left, 07/26/2023); Wound debridement (Left, 08/02/2023); Arm amputation at elbow (Right, 05/2021); AV fistula placement w/ PTFE; IR CVC PICC (10/19/2023); IR CVC PICC (2/28/2022); and Invasive Vascular Procedure (N/A, 7/29/2024).     Social History     Occupational History    Not on file   Tobacco Use    Smoking status: Every Day     Current packs/day: 0.25     Average packs/day: 0.3 packs/day for 15.0 years (3.8 ttl pk-yrs)     Types: Cigarettes    Smokeless tobacco: Never   Substance and Sexual Activity    Alcohol use: Not on file    Drug use: Not on file    Sexual activity: Not on file     Travel History   Travel since 08/10/24    No documented travel since 08/10/24            Family History  Family History   Problem Relation Name Age of Onset    Other (DIABETES DUE TO UNDERLYING CONDITION WITH MICROALBUMINURIA) Father      Other (DIABETES DUE TO UNDERLYING CONDITION WITH MICROALBUMINURIA [Other]) Other AUNT     Other (DIABETES DUE TO UNDERLYING CONDITION WITH MICROALBUMINURIA [Other]) Other GP      Allergies  Cashew nut and Daptomycin     Immunization History   Administered Date(s) Administered    Flu vaccine (IIV4), preservative free *Check age/dose* 09/17/2020    Pneumococcal polysaccharide vaccine, 23-valent, age 2 years and older (PNEUMOVAX 23) 10/02/2006    Tdap vaccine, age 7 year and older (BOOSTRIX, ADACEL) 10/23/2014     Medications  Current medications:  Scheduled medications    Continuous medications    PRN medications      Review of Systems     Objective: Virtual Visit       Relevant Results  Labs              CrCl cannot be calculated (Patient's most recent lab result is older than the maximum 3 days allowed.).  C-Reactive Protein   Date Value Ref Range Status   07/27/2024 6.44 (H) <1.00 mg/dL Final     CRP   Date Value Ref Range Status   07/24/2023 8.04 (A) mg/dL Final     Comment:     REF VALUE  < 1.00     07/24/2023  CANCELED       Comment:     Result canceled by the ancillary.     Sedimentation Rate   Date Value Ref Range Status   07/27/2024 <1 0 - 15 mm/h Final   07/02/2023 >130 (H) 0 - 15 mm/h Final   06/18/2023 130 (H) 0 - 15 mm/h Final       Microbiology  Susceptibility data from last 90 days.  Collected Specimen Info Organism Clindamycin Erythromycin Oxacillin Rifampin Tetracycline Trimethoprim/Sulfamethoxazole Vancomycin   07/27/24 Fluid from Synovial Methicillin Resistant Staphylococcus aureus (MRSA)  R  R  R   R  S  S   07/27/24 Blood culture from Peripheral Venipuncture Staphylococcus haemolyticus  R  R  R   S  R  S   07/25/24 Blood culture from Peripheral Venipuncture Staphylococcus aureus          07/25/24 Blood culture from Peripheral Venipuncture Staphylococcus aureus          07/22/24 Blood culture from Peripheral Venipuncture Methicillin Resistant Staphylococcus aureus (MRSA)  R  R  R  I  R  S  S   07/22/24 Blood culture from Peripheral Venipuncture Staphylococcus aureus            Assessment/Plan  -07/22 Bcx: MRSA in 2/2, S bactrim and vanc  -07/25 Bcx: MRSA in 2/2  -07/27 Bcx: Staph haemolyticus (1/1) (likely contaminant)  -07/27 OR culture (L wrist): MRSA  -07/29 Bcx: NG  -07/31 Bcx: NG     Imaging  Reviewed in Epic.   TTE 7/23 with thickened TV and mobile densities associated with the TV with severe TR, not seen previously (TTE 5/14/24 with trivial TR and normal TV), suspicious for endocarditis.  Currently receiving Vancomycin and Ceftaroline for the Methicillin-resistant Staphylococcus aureus.  POLI 7/24 with EF 40 and global hypokinesis, large vegetation encasing TDC with involvement and destruction of septal leaflet of TV and severe TR. Small mobile echodensity on aortic valve, concerning for vegetation vs degenerative change. Moderate patent foramen ovale.      CT 7/25  IMPRESSION:  CHEST:  1. Interval development of bilateral pulmonary nodular opacities  several of them showing cavitation. Findings are  concerning for  septic embolism/cavitary pneumonia.  2. Bilateral pleural effusions.  3. Right femoral central venous catheter with tip terminating in the  right ventricle. Hypodense thrombus around the catheter in the right  atrium correlating with the vegetation seen on prior echocardiogram.  4. Severely attenuated superior vena cava, bilateral brachiocephalic,  subclavian and internal jugular veins with the calcifications in the  brachiocephalic veins with extensive collaterals in the chest wall  likely representing chronic thrombosis.  ABDOMEN-PELVIS:  1. Severely attenuated inferior vena cava with multiple  calcifications, consistent with chronic IVC thrombosis.  2. Interval development of a T10 vertebral body fracture.  Re-demonstration of severe osseous changes throughout the  thoracolumbar spine. Findings are likely secondary to renal  osteodystrophy.  3. Diffuse thickening of the colon and rectum with the pericolonic  stranding consistent with the proctocolitis which can be  inflammatory/infectious in etiology. Correlate with clinical symptoms.  4. Diffuse mesenteric haziness and anasarca likely representing fluid  overload.  5. Bilateral atrophied kidneys with hypoenhancement, representative  of medical renal disease.     Assessment/Plan  39 y.o. 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 Cultures cleared 7/29.     Vegectomy not pursued ultimately due to comorbidities. CT surgery previously evaluated as well, not planned for surgical intervention for vegetation nor clot. Unclear if component of prior extensive clot might be involved  at this point given his extensive MRSA bacteremia history. iHD line has also been declined to be exchanged due to access limitations/availability of providers.    Planned for indefinite PO suppression with Bactrim. Reviewed risk of breakthrough and side effects today, and concerning prognosis if either recurs and/or if vascular access for iHD becomes a limiting factor.     Recommendations:  -Continue vancomycin post-HD through end date 9/13  -Called [facility] today to start PO Bactrim suppression (1 SS nightly/after dialysis), let [facility] at [place] know as well  -Reviewed side effects to monitor for while on Bactrim  -Follow-up in 2-3 months time     I spent 25 minutes in the care of this patient, including chart review, patient interview/exam, and documentation.    Doroteo Graham MD

## 2024-09-13 RX ORDER — SULFAMETHOXAZOLE AND TRIMETHOPRIM 400; 80 MG/1; MG/1
1 TABLET ORAL NIGHTLY
Qty: 90 TABLET | Refills: 3 | Status: SHIPPED | OUTPATIENT
Start: 2024-09-13 | End: 2024-09-13 | Stop reason: WASHOUT

## 2024-10-23 ENCOUNTER — APPOINTMENT (OUTPATIENT)
Dept: TRANSPLANT | Facility: HOSPITAL | Age: 39
End: 2024-10-23
Payer: COMMERCIAL

## 2024-10-23 ENCOUNTER — TELEPHONE (OUTPATIENT)
Dept: INFECTIOUS DISEASES | Facility: HOSPITAL | Age: 39
End: 2024-10-23
Payer: COMMERCIAL

## 2024-10-23 NOTE — TELEPHONE ENCOUNTER
Contacted by [facility] regarding Mr. Hemphill having nausea and loose stools (~2 times per day it sounds) on Bactrim that started essentially right. No fevers, chills, abdominal pain, diarrhea >3x/day, or rash. He does not typically eat at night. We do not unfortunately have great suppressive options otherwise. We discussed risks/benefits of this and the potential of breakthrough infection, but given his symptoms he preferred to trial Bactrim SS three times per week after iHD with food. His loose stools may improve with this and with him taking his binders, which he was not taking.

## 2024-10-24 ENCOUNTER — APPOINTMENT (OUTPATIENT)
Dept: TRANSPLANT | Facility: HOSPITAL | Age: 39
End: 2024-10-24
Payer: COMMERCIAL

## 2024-10-31 NOTE — PROGRESS NOTES
LOV: 22, /71, A1C 6.9%  NOV: 23     Treatment Goals     For high blood pressure:   Treatment goals include:  [place]/continue prudent diet and regular exercise.   Blood Pressure Treatment goals include:   BP of 120/80, with no higher than 140/85 on consistent basis.   Exercise at least 3-4 days a week for at least 30 minutes  Eat a balanced diet, rich in fruits and vegetables, low in sodium and processed foods.  If you are overweight, work toward a goal BMI of less than 25, with short-term goal of 10 pound weight loss.     For diabetes:   Treatment goals include:  HgbA1C less than 7.0%  Fasting B - 130 mg/dL  Postprandial BG: less than 180 mg/dL  /80 on consistent basis, with no higher than 140/85  LDL cholesterol less than 100, and HDL cholesterol above 40.  Yearly retinal exam  Check feet periodically for sores or decreased sensation  Exercise at least 3-4 days a week for at least 30 minutes  Work toward a goal BMI of less than 25, although in the shorter term, losing even 10 pounds will help.     Think about those things which may be affecting your ability to reach those goals, and develop a plan to overcome them.     Additional resources are available upon request to help you attain goals, including dietitian.    Wendy Feldman RN     Pt ambulatory out with paperwork in hand. DC instructions given. AMA paperwork signed.

## 2024-12-02 NOTE — PROGRESS NOTES
[facility] Infectious Diseases Follow-up: Telephone Visit    Date of Consultation/Follow-up: 12/2/2024  Primary MD: Mauricio Estrada MD    Reason For Consult: MRSA bacteremia    History Of Present Illness  Edwar Hemphill is a 39 y.o. 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 Cultures cleared 7/29. Vegectomy not pursued ultimately due to comorbidities. CT surgery evaluated as well, did not undergo surgical intervention for vegetation nor clot. iHD line was declined to be exchanged due to access limitations/availability of providers.     Discharged on IV vancomycin x 6 weeks followed by plan for Bactrim suppression, currently on Bactrim SS three times per week after HD given concerns for accumulation-related symptoms in 10/2024 (severe nausea, loose stools).    Today he reports around 2-3 days of some nausea without diarrhea (1 loose BM per day), vomiting, fevers, chills, sweats. He has been around some sick contacts at his facility. He denies rash. He has been taking Bactrim SS three times per week after dialysis without much issues with side effects these past few months since 10/2024.     Past Medical History  He has a past medical history of Chronic kidney disease, Diabetes mellitus (Multi), ESRD (end stage renal disease) (Multi), Essential (primary) hypertension (05/26/2017), GERD (gastroesophageal reflux disease), Hyperlipidemia, and Hypothyroidism.    Surgical History  He has a past surgical history that includes CT angio aorta and bilateral iliofemoral runoff including without contrast if  performed (02/09/2023); IR CVC exchange (11/13/2023); Toe amputation (Left, 02/17/2023); Leg amputation, lower tibia/fibula (Left, 07/05/2023); Leg amputation through knee (Left, 07/08/2023); Wound debridement (Left, 07/26/2023); Wound debridement (Left, 08/02/2023); Arm amputation at elbow (Right, 05/2021); AV fistula placement w/ PTFE; IR CVC PICC (10/19/2023); IR CVC PICC (2/28/2022); and Invasive Vascular Procedure (N/A, 7/29/2024).     Social History     Occupational History    Not on file   Tobacco Use    Smoking status: Every Day     Current packs/day: 0.25     Average packs/day: 0.3 packs/day for 15.0 years (3.8 ttl pk-yrs)     Types: Cigarettes    Smokeless tobacco: Never   Substance and Sexual Activity    Alcohol use: Not on file    Drug use: Not on file    Sexual activity: Not on file     Travel History   Travel since 11/02/24    No documented travel since 11/02/24            Family History  Family History   Problem Relation Name Age of Onset    Other (DIABETES DUE TO UNDERLYING CONDITION WITH MICROALBUMINURIA) Father      Other (DIABETES DUE TO UNDERLYING CONDITION WITH MICROALBUMINURIA [Other]) Other AUNT     Other (DIABETES DUE TO UNDERLYING CONDITION WITH MICROALBUMINURIA [Other]) Other GP      Allergies  Cashew nut and Daptomycin     Immunization History   Administered Date(s) Administered    Flu vaccine (IIV4), preservative free *Check age/dose* 09/17/2020    Pneumococcal polysaccharide vaccine, 23-valent, age 2 years and older (PNEUMOVAX 23) 10/02/2006    Tdap vaccine, age 7 year and older (BOOSTRIX, ADACEL) 10/23/2014     Medications  Current medications:  Scheduled medications    Continuous medications    PRN medications      Review of Systems     Objective: Telephone Visit       Relevant Results  Labs              CrCl cannot be calculated (Patient's most recent lab result is older than the maximum 3 days allowed.).  C-Reactive Protein   Date Value Ref Range Status   07/27/2024 6.44 (H) <1.00  mg/dL Final     CRP   Date Value Ref Range Status   07/24/2023 8.04 (A) mg/dL Final     Comment:     REF VALUE  < 1.00     07/24/2023 CANCELED       Comment:     Result canceled by the ancillary.     Sedimentation Rate   Date Value Ref Range Status   07/27/2024 <1 0 - 15 mm/h Final   07/02/2023 >130 (H) 0 - 15 mm/h Final   06/18/2023 130 (H) 0 - 15 mm/h Final       Microbiology  No results found for the last 90 days.    Assessment/Plan  -07/22 Bcx: MRSA in 2/2, S bactrim and vanc only  -07/25 Bcx: MRSA in 2/2  -07/27 Bcx: Staph haemolyticus (1/1) (likely contaminant)  -07/27 OR culture (L wrist): MRSA  -07/29 Bcx: NG  -07/31 Bcx: NG     Imaging  Reviewed in Epic.   TTE 7/23 with thickened TV and mobile densities associated with the TV with severe TR, not seen previously (TTE 5/14/24 with trivial TR and normal TV), suspicious for endocarditis.  Currently receiving Vancomycin and Ceftaroline for the Methicillin-resistant Staphylococcus aureus.  POLI 7/24 with EF 40 and global hypokinesis, large vegetation encasing TDC with involvement and destruction of septal leaflet of TV and severe TR. Small mobile echodensity on aortic valve, concerning for vegetation vs degenerative change. Moderate patent foramen ovale.   CT 7/25  IMPRESSION:  CHEST:  1. Interval development of bilateral pulmonary nodular opacities  several of them showing cavitation. Findings are concerning for  septic embolism/cavitary pneumonia.  2. Bilateral pleural effusions.  3. Right femoral central venous catheter with tip terminating in the  right ventricle. Hypodense thrombus around the catheter in the right  atrium correlating with the vegetation seen on prior echocardiogram.  4. Severely attenuated superior vena cava, bilateral brachiocephalic,  subclavian and internal jugular veins with the calcifications in the  brachiocephalic veins with extensive collaterals in the chest wall  likely representing chronic thrombosis.  ABDOMEN-PELVIS:  1. Severely  attenuated inferior vena cava with multiple  calcifications, consistent with chronic IVC thrombosis.  2. Interval development of a T10 vertebral body fracture.  Re-demonstration of severe osseous changes throughout the  thoracolumbar spine. Findings are likely secondary to renal  osteodystrophy.  3. Diffuse thickening of the colon and rectum with the pericolonic  stranding consistent with the proctocolitis which can be  inflammatory/infectious in etiology. Correlate with clinical symptoms.  4. Diffuse mesenteric haziness and anasarca likely representing fluid  overload.  5. Bilateral atrophied kidneys with hypoenhancement, representative  of medical renal disease.     Assessment/Plan  39 y.o. male with ESRD on HD (MWF, right femoral line), HFrEF (EF 35-40% 5/2024), T2DM, PAD s/p L AKA and R above elbow amputation with multiple hospitalizations for L AKA infection and MRSA bacteremia who presented from 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 Cultures cleared 7/29. Vegectomy not pursued ultimately due to comorbidities. CT surgery previously evaluated as well, not planned for surgical intervention for vegetation nor clot. iHD line has also been declined to be exchanged due to access limitations/availability of providers to do so. Discharged on IV vancomycin x 6 weeks followed by plan for Bactrim suppression.    Currently on Bactrim SS three times per week after HD given concerns for suspected accumulation-related symptoms while on Bactrim SS daily through 10/2024 (severe nausea, loose stools).     Planned for indefinite PO suppression with Bactrim. Reviewed risk of breakthrough and side effects today, and concerning prognosis if either recurs and/or if vascular access for iHD becomes a limiting factor. We do not  have great alternative long-term suppressive agents based on his MRSA susceptibilities that would be well tolerated over long periods of time.     Recommendations:  -Continue PO Bactrim suppression (1 SS after dialysis) indefinitely  -Reviewed side effects to monitor for while on Bactrim. Will call us if his acute nausea symptoms persist, though I have lower suspicion his acute nausea is related to this  -Reviewed prognosis/limited treatment options available for suppression currently with him today  -Follow-up in 3-6 months time     I spent 25 minutes in the care of this patient, including chart review, patient interview/exam, and documentation.    Doroteo Graham MD

## 2024-12-03 ENCOUNTER — APPOINTMENT (OUTPATIENT)
Dept: INFECTIOUS DISEASES | Facility: CLINIC | Age: 39
End: 2024-12-03
Payer: COMMERCIAL

## 2024-12-03 DIAGNOSIS — A49.02 MRSA INFECTION: Primary | ICD-10-CM

## 2024-12-03 PROCEDURE — 99443 PR PHYS/QHP TELEPHONE EVALUATION 21-30 MIN: CPT | Performed by: STUDENT IN AN ORGANIZED HEALTH CARE EDUCATION/TRAINING PROGRAM

## 2024-12-03 RX ORDER — SULFAMETHOXAZOLE AND TRIMETHOPRIM 400; 80 MG/1; MG/1
1 TABLET ORAL 3 TIMES WEEKLY
COMMUNITY

## 2024-12-03 NOTE — Clinical Note
Hi team,  Could we arrange follow-up for him with me in 6 months time? Telephone or virtual ok. Diagnosis: MRSA endocarditis, tolerance check. Thanks!  Doorteo

## 2024-12-12 NOTE — PROGRESS NOTES
"Edwar Hemphill is a 38 y.o. male on day 8 of admission presenting with Septic shock (CMS/HCC).    Subjective   Glargine refused last night, patient states he was anticipating NPO status    Scheduled medications  B complex-vitamin C-folic acid, 1 capsule, oral, Daily  epoetin tyson or biosimilar, 10,000 Units, subcutaneous, Once per day on Mon Wed Fri  heparin (porcine), 5,000 Units, subcutaneous, q8h  heparin, 1,400 Units, intra-catheter, After Dialysis  heparin, 1,400 Units, intra-catheter, After Dialysis  insulin glargine, 12 Units, subcutaneous, Nightly  insulin lispro, 0-15 Units, subcutaneous, TID with meals  levothyroxine, 125 mcg, oral, Daily  lidocaine, 5 mL, infiltration, Once  lidocaine PF, 10 mL, subcutaneous, Once  pantoprazole, 40 mg, oral, BID   Or  pantoprazole, 40 mg, intravenous, BID  perflutren lipid microspheres, 1 mL of dilution, intravenous, Once in imaging  sevelamer carbonate, 2,400 mg, oral, TID with meals  sodium bicarbonate 1 mEq in lidocaine-epinephrine (Xylocaine W/EPI) 1 %-1:100,000 9 mL Syringe, 10 mL, subcutaneous, Once  vancomycin, 750 mg, intravenous, Once        Objective   Physical Exam  Constitutional:       General: He is not in acute distress.  Neurological:      Mental Status: He is alert.         Last Recorded Vitals  Blood pressure 117/75, pulse 92, temperature 36.3 °C (97.3 °F), temperature source Temporal, resp. rate 18, height 1.753 m (5' 9\"), weight 70.3 kg (155 lb), SpO2 95 %.  Intake/Output last 3 Shifts:  I/O last 3 completed shifts:  In: 600 (8.5 mL/kg) [P.O.:600]  Out: - (0 mL/kg)   Weight: 70.3 kg     Relevant Results  Results from last 7 days   Lab Units 01/22/24  0534 01/21/24  2310 01/21/24  1626 01/21/24  1205 01/21/24  0815 01/21/24  0607 01/20/24  2100 01/20/24  0806 01/20/24  0620 01/19/24  1018 01/19/24  0536 01/18/24  0812 01/18/24  0503   POCT GLUCOSE mg/dL  --  148* 159* 243* 382*  --  277*   < >  --    < >  --    < >  --    GLUCOSE mg/dL 247*  --   --   --  "  --  455*  --   --  516*  --  252*  --  131*    < > = values in this interval not displayed.       Assessment/Plan   Principal Problem:    Septic shock (CMS/[facility])      IMPRESSION  TYPE 2 DIABETES MELLITUS  LONG TERM CURRENT INSULIN USE  Complicated by ESRD, amputations  Low insulin requirement  Hyperglycemic this AM  Glargine refused last night        RECOMMENDATIONS  Resume glargine 12 units at bedtime tonight  Continue lispro scale.    Mike Santana MD     OR RN

## 2025-03-19 ENCOUNTER — DOCUMENTATION (OUTPATIENT)
Facility: HOSPITAL | Age: 40
End: 2025-03-19
Payer: COMMERCIAL

## 2025-03-19 ENCOUNTER — POST MORTEM DOCUMENTATION (OUTPATIENT)
Facility: HOSPITAL | Age: 40
End: 2025-03-19
Payer: COMMERCIAL

## (undated) DEVICE — CATHETER, TRIEVER, THROMBECTOMY, 24FR   (PPP)

## (undated) DEVICE — DRAPE, TOWEL, STERI DRAPE, 17 X 11 IN, PLASTIC, STERILE

## (undated) DEVICE — SUTURE, PROLENE, 3-0, 18 IN, PS2, BLUE

## (undated) DEVICE — SHEATH, INTRI24,  24FR

## (undated) DEVICE — Device

## (undated) DEVICE — SUTURE, ETHILON, 4-0, BLK, MONO, PS-2 18

## (undated) DEVICE — GUIDEWIRE, INQWIRE, 3MM J, .035, 150

## (undated) DEVICE — DRAPE MINI, C-ARM, W/POLY STRAPS, 54 X 84 IN

## (undated) DEVICE — PREP TRAY, SKIN, DRY, W/GLOVES

## (undated) DEVICE — MANIFOLD, 4 PORT NEPTUNE STANDARD

## (undated) DEVICE — SHEATH, PINNACLE, W/O GUIDEWIRE, 8FR INTRODUCER,  8FR DIA, 2.5 CM DILATOR

## (undated) DEVICE — CATHETER, VTE-PPP

## (undated) DEVICE — GUIDEWIRE, STRAIGHT, AMPLATZ SUPER STIFF ST-1, 0.035 IN X 260 CM

## (undated) DEVICE — DRAPE, SHEET, FAN FOLDED, HALF, 44 X 58 IN, DISPOSABLE, LF, STERILE

## (undated) DEVICE — APPLICATOR, CHLORAPREP, W/ORANGE TINT, 26ML

## (undated) DEVICE — PAD, ELECTRODE DEFIB PADPRO ADULT STRL W/ADAPTER

## (undated) DEVICE — SHEATH, PINNACLE, 10 CM,  8FR INTRODUCER, 8FR DIA, 2.5 CM DIALATOR

## (undated) DEVICE — CORD, BIPOLAR,  12 FT, DISPOSABLE, LF

## (undated) DEVICE — CLOSURE SYSTEM, VASCULAR, MVP 6-12FR, VENOUS

## (undated) DEVICE — SYSTEM, FLOWSAVER, BLOOD RETURN

## (undated) DEVICE — ACCESS KIT, S-MAK MINI, 4FR 10CM 0.018IN 40CM, NT/PT, ECHO ENHANCE NEEDLE

## (undated) DEVICE — SUTURE, MONOCRYL, 4-0, 18 IN, PS2, UNDYED

## (undated) DEVICE — GUIDE WIRE, 260CM, HI-TORQUE, VERSACORE, MODIFIED J

## (undated) DEVICE — REST, HEAD, BAGEL, 9 IN

## (undated) DEVICE — INTRODUCER SET, MICROPUNCTURE, REG, 4FR 10CM W/SS GUIDEWIRE

## (undated) DEVICE — COVER, CART, 45 X 27 X 48 IN, CLEAR

## (undated) DEVICE — CUFF, TOURNIQUET, 18 X 4, SNGL PORT/SNGL BLADDER, DISP, LF

## (undated) DEVICE — BANDAGE, ELASTIC, MATRIX, SELF-CLOSURE, 2 IN X 5 YD, LF